# Patient Record
Sex: MALE | Race: WHITE | NOT HISPANIC OR LATINO | Employment: OTHER | URBAN - METROPOLITAN AREA
[De-identification: names, ages, dates, MRNs, and addresses within clinical notes are randomized per-mention and may not be internally consistent; named-entity substitution may affect disease eponyms.]

---

## 2017-04-24 ENCOUNTER — APPOINTMENT (OUTPATIENT)
Dept: GENERAL RADIOLOGY | Facility: HOSPITAL | Age: 67
End: 2017-04-24

## 2017-04-24 ENCOUNTER — TELEPHONE (OUTPATIENT)
Dept: FAMILY MEDICINE CLINIC | Facility: CLINIC | Age: 67
End: 2017-04-24

## 2017-04-24 PROCEDURE — 73610 X-RAY EXAM OF ANKLE: CPT | Performed by: FAMILY MEDICINE

## 2017-04-24 PROCEDURE — 73630 X-RAY EXAM OF FOOT: CPT | Performed by: FAMILY MEDICINE

## 2017-04-24 NOTE — TELEPHONE ENCOUNTER
----- Message from Michaela Zhang sent at 4/24/2017  9:14 AM EDT -----  Contact: [phone]  PT FELL BY MISSING STEP AND WANTS TO HAVE FOOT XRAYED. PLEASE CALL

## 2017-04-25 ENCOUNTER — TELEPHONE (OUTPATIENT)
Dept: URGENT CARE | Facility: CLINIC | Age: 67
End: 2017-04-25

## 2017-07-05 ENCOUNTER — HOSPITAL ENCOUNTER (OUTPATIENT)
Dept: SLEEP MEDICINE | Facility: HOSPITAL | Age: 67
Discharge: HOME | End: 2017-07-05
Attending: INTERNAL MEDICINE

## 2017-07-05 PROCEDURE — 99213 OFFICE O/P EST LOW 20 MIN: CPT | Performed by: INTERNAL MEDICINE

## 2017-07-09 NOTE — PROGRESS NOTES
Follow Up [facility] Disorders [facility] Note       Patient Care Team:  Bayron Moya MD as PCP - Family Medicine  Morris Cia MD as Consulting Physician (Sleep Medicine)    Chief Complaint:  COCO     Interval History:   The patient was last seen by me in July 2016.  From the COCO standpoint he is stable without any new complaints.  He goes to bed 11 PM awakens at 10 AM.  He wakes up twice due to neuropathy.  [place] Sleepiness Scale is normal at 6.    The patient is having increasing pain from spinal stenosis.  He did have a severe dental infection that is now corrected he underwent cataract surgeries he broke his ankle.    Review of Systems:  Recorded on the Sleep Questionnaire.  Unremarkable except for nasal congestion, abdominal bloating, ringing in his ears, and depression.    Social History:  He does not smoke cigarettes.  No alcohol.  3 caffeinated beverages a day.    Allergies:  Reviewed.     Medication Review:  His list was reviewed.      Vital Signs:  Height 68 1/4 inches and weight 182 and he is overweight with a body mass index of 27-28.    Physical Exam:    Constitutional:  Well developed white male and appears in no apparent distress.  Awake & oriented times 3.  Normal mood with normal recent and remote memory and normal judgement.  Eyes:  Conjunctivae normal.  Oropharynx:  moist mucous membranes without exudate and a large tongue and normal uvula and moderate narrowing of the posterior pharyngeal opening and class II MP airway.      Results Review:  DME is [facility] and he uses nasal pillows.  Downloads between January 6 and July 4, 2017 compliances 99% and average usage is 4 hours and 8 minutes and average AHI is abnormal at 10.1 previously it was 19.7.  He has no leak.  Average auto BiPAP pressures: IPAP 13.5 and EPAP 10.7.  Auto BiPAP settings: Max IPAP 16 minimum EPAP 8 max pressure support 6 minimal pressure support 2.       Impression:   Obstructive sleep apnea nearly adequately treated with auto  titrating BiPAP with good compliance and borderline usage.  He has no complaints of hypersomnolence.      Plan:  Good sleep hygiene measures should be maintained.  Some weight loss would be beneficial in this patient who is overweight by BMI.    The patient will continue his auto BiPAP every night as he is doing increased hours encouraged.  His minimum auto BiPAP EPAP will be changed from 8 up to 10.  A new prescription will be sent to his DME for all needed supplies.    The patient will follow up in one year.      Morris Cai MD  07/09/17  9:07 AM

## 2018-06-07 ENCOUNTER — TRANSCRIBE ORDERS (OUTPATIENT)
Dept: ADMINISTRATIVE | Facility: HOSPITAL | Age: 68
End: 2018-06-07

## 2018-06-07 DIAGNOSIS — N88.2 CERVICAL OS STENOSIS: Primary | ICD-10-CM

## 2018-06-22 RX ORDER — AMLODIPINE BESYLATE 10 MG/1
10 TABLET ORAL DAILY
COMMUNITY
End: 2019-07-12 | Stop reason: ALTCHOICE

## 2018-06-22 RX ORDER — BUPROPION HYDROCHLORIDE 150 MG/1
150 TABLET ORAL DAILY
COMMUNITY
End: 2019-07-12 | Stop reason: ALTCHOICE

## 2018-06-27 ENCOUNTER — HOSPITAL ENCOUNTER (OUTPATIENT)
Dept: CT IMAGING | Facility: HOSPITAL | Age: 68
Discharge: HOME | End: 2018-06-27
Attending: NEUROLOGICAL SURGERY | Admitting: NEUROLOGICAL SURGERY

## 2018-06-27 ENCOUNTER — HOSPITAL ENCOUNTER (OUTPATIENT)
Dept: GENERAL RADIOLOGY | Facility: HOSPITAL | Age: 68
Discharge: HOME | End: 2018-06-27
Attending: NEUROLOGICAL SURGERY

## 2018-06-27 VITALS
BODY MASS INDEX: 27.85 KG/M2 | DIASTOLIC BLOOD PRESSURE: 74 MMHG | WEIGHT: 188 LBS | TEMPERATURE: 98 F | RESPIRATION RATE: 16 BRPM | SYSTOLIC BLOOD PRESSURE: 118 MMHG | OXYGEN SATURATION: 97 % | HEIGHT: 69 IN | HEART RATE: 68 BPM

## 2018-06-27 DIAGNOSIS — N88.2 CERVICAL OS STENOSIS: ICD-10-CM

## 2018-06-27 PROCEDURE — 72125 CT NECK SPINE W/O DYE: CPT

## 2018-06-27 PROCEDURE — 25010000002 IOPAMIDOL 61 % SOLUTION: Performed by: RADIOLOGY

## 2018-06-27 PROCEDURE — 72240 MYELOGRAPHY NECK SPINE: CPT

## 2018-06-27 PROCEDURE — 62302 MYELOGRAPHY LUMBAR INJECTION: CPT

## 2018-06-27 RX ORDER — MULTIVITAMIN
1 TABLET ORAL
COMMUNITY
End: 2019-07-12

## 2018-06-27 RX ORDER — TESTOSTERONE 20.25 MG/1.25G
1 GEL TOPICAL DAILY
COMMUNITY
End: 2019-09-11 | Stop reason: ALTCHOICE

## 2018-06-27 RX ORDER — LANOLIN ALCOHOL/MO/W.PET/CERES
9 CREAM (GRAM) TOPICAL NIGHTLY
COMMUNITY
End: 2021-10-21

## 2018-06-27 RX ORDER — LIDOCAINE HYDROCHLORIDE 10 MG/ML
10 INJECTION, SOLUTION INFILTRATION; PERINEURAL ONCE
Status: COMPLETED | OUTPATIENT
Start: 2018-06-27 | End: 2018-06-27

## 2018-06-27 RX ADMIN — IOPAMIDOL 12 ML: 612 INJECTION, SOLUTION INTRATHECAL at 07:48

## 2018-06-27 RX ADMIN — LIDOCAINE HYDROCHLORIDE 5 ML: 10 INJECTION, SOLUTION INFILTRATION; PERINEURAL at 07:46

## 2018-06-27 NOTE — H&P
Name: Dilip Verma ADMIT: 2018   : 1950  PCP: No Known Provider    MRN: [id number] LOS: 0 days   AGE/SEX: 68 y.o. male  ROOM: Room/bed info not found       Chief complaint   Patient is a 68 y.o. male presents with neck pain.     Past Surgical History:  Past Surgical History:   Procedure Laterality Date   • CATARACT EXTRACTION EXTRACAPSULAR W/ INTRAOCULAR LENS IMPLANTATION Bilateral    • COLONOSCOPY  2015    dr [email or web address]   • DENTAL PROCEDURE      3 surgeries inder implants   • HERNIA REPAIR      inguinal bilaterally   • KIDNEY STONE SURGERY      lithotripsy       Past Medical History:  Past Medical History:   Diagnosis Date   • Anxiety    • Chronic kidney disease     stones- had lithotripsy   • Colon polyp    • Diabetes mellitus, type II    • Erectile dysfunction    • H/O bone density study never   • History of chest x-ray 07/15/2015    [facility] [place] [facility] [address]   • History of echocardiogram 2014    [facility]   • History of EKG 2014    DR. JOE MEDINA [phone] [facility] [place]   • History of stress test 2014    PET DIPYRIDAMOLE STRESS TEST; DR. JOE MEDINA - [facility] [place]   • Hx of echocardiogram 2014    [facility]   • Hyperlipidemia    • Hypertension    • Routine eye exam    • Sleep apnea     bipap       Home Medications:    (Not in a hospital admission)    Allergies:  Ace inhibitors and Sulfa antibiotics    Family History:  Family History   Problem Relation Age of Onset   • Depression Mother    • Cancer Mother         uterine   • Heart disease Father    • Hypertension Father    • Kidney disease Father    • Thyroid disease Father    • Depression Sister    • Alcohol abuse Brother    • Alcohol abuse Other    • Alcohol abuse Other    • Lung disease Other    • Thyroid disease Other    • Glaucoma Other        Social History:  Social History   Substance Use Topics   • Smoking status: Former Smoker     Packs/day: 0.75   • Smokeless tobacco: Never Used      Comment: Start age:39/Stopping age:47, 3/4 PPD   •  Alcohol use No        Objective     Physical Exam:   Cervical stenosis    Vital Signs       Anticipated Surgical Procedure:  myelogram    The risks, benefits and alternatives of this procedure have been discussed with the patient or responsible party: Yes        Alexandru Guerrero MD  06/27/18  6:36 AM

## 2018-06-27 NOTE — DISCHARGE INSTRUCTIONS
EDUCATION /DISCHARGE INSTRUCTIONS:  A myelogram is a special radiology procedure of the spinal cord, spinal nerves and other related structures.  You will be awake during the examination.  An area of your lower back will be cleansed with an antiseptic solution.  The physician will inject a numbing medication in your lower back.  While your back is numb, a needle will be placed in the lower back area.  A small amount of spinal fluid may be withdrawn and sent to the lab if ordered by your physician. While the needle is in the back, an injection of a contrast material (xray dye) will be given through the needle.  The contrast material will allow the physician to see the spinal cord and spinal nerves.  Once injected, the needle will be removed and a band aid will be placed over the injection site.  The table will be tilted during the process to allow the contrast material to flow to particular areas in the spine.  Following the injection and xrays, you will be taken to the CT scan where more pictures will be taken. After the procedure is finished, the contrast material will be absorbed by your body and eliminated through your kidneys.  The radiologist will study and interpret your myelogram and send the results to your physician.    Procedure risks of a myelogram include, but are not limited to:  *  Bleeding   *  seizure  *  Infection   *  Headache, possibly severe requiring  *  Contrast reaction      a blood patch  *  Nerve or cord injury  *  Paralysis and death    Benefits of the procedure:  *  Best examination for delineating pathology related to spinal cord compression from a disc and/or nerve root compression    Alternatives to the procedure:  MRI - a non invasive procedure requiring intravenous contrast injection.  Cannot be done on patients with certain pacemakers or metal in the body.  MRI risks include possible reaction to the contrast material, movement of metal located in the body.  Benefit to MRI:   Non-invasive and usually painless procedure.  THIS EDUCATION INFORMATION WAS REVIEWED PRIOR TO THE PROCEDURE AND CONSENT. Patient initials __________________Time____0717_____________    Important information following your myelogram:  *  Lie down with your head elevated no more than 2 pillows high today & tonight  *  Tomorrow, lie down with 1 pillow all day and all night  *  Sit up to eat meals and use the bathroom, otherwise, lie down  *  Do not drive for 48 hours following a myelogram  *  You may remove the bandage and shower in the morning  *  Increase your fluids for the next 24 hours.  Caffeinated drinks are encouraged.     Resume taking your blood thinner or Aspirin on _No Aspirin for 24 hours after the myelogram __    Resume taking antipsychotics/antidepressant on _Wellbutrin (buproprion)  Resume 24 hours after the myelogram 6/28/18 after 1100__    CALL YOUR PHYSICIAN FOR THE FOLLOWING:    * Pain at the injection site  * Reddness, swelling, bruising or drainage at the injection site  * A fever by mouth of 101.0  * Any new symptoms    If you have problems with a headache that is not relieved with rest and medication, please call the Radiology Triage Nurse desk  [phone]

## 2018-06-27 NOTE — NURSING NOTE
Verbal and written D/C instructions given to patient and wife.  They voice understanding and are able to teach back D/C instructions.

## 2018-06-28 ENCOUNTER — TELEPHONE (OUTPATIENT)
Dept: INTERVENTIONAL RADIOLOGY/VASCULAR | Facility: HOSPITAL | Age: 68
End: 2018-06-28

## 2018-07-19 ENCOUNTER — OFFICE VISIT (OUTPATIENT)
Dept: SLEEP MEDICINE | Facility: HOSPITAL | Age: 68
End: 2018-07-19
Attending: INTERNAL MEDICINE

## 2018-07-19 DIAGNOSIS — G47.33 OBSTRUCTIVE SLEEP APNEA SYNDROME: Primary | ICD-10-CM

## 2018-07-19 DIAGNOSIS — G47.14 HYPERSOMNIA DUE TO MEDICAL CONDITION: ICD-10-CM

## 2018-07-19 PROCEDURE — G0463 HOSPITAL OUTPT CLINIC VISIT: HCPCS

## 2018-07-19 PROCEDURE — 99213 OFFICE O/P EST LOW 20 MIN: CPT | Performed by: INTERNAL MEDICINE

## 2018-07-19 NOTE — PROGRESS NOTES
Follow Up Sleep Disorders Center Note     Chief Complaint:  COCO     Primary Care Physician: No Known Provider    Interval History:   The patient was last seen by me in July 2017.  He is probably the same.  He complains of low energy.  He goes to bed at midnight and awakens between 7:30 and 9 AM.  [place] Sleepiness Scale is abnormal at 12.    Review of Systems:  Recorded on the Sleep Questionnaire.  Unremarkable except for nasal congestion and postnasal drip and some abdominal bloating    Social History:  5 cups of coffee  Social History     Social History   • Marital status:      Social History Main Topics   • Smoking status: Former Smoker     Packs/day: 0.75   • Smokeless tobacco: Never Used      Comment: Start age:39/Stopping age:47, 3/4 PPD   • Alcohol use No   • Drug use: No   • Sexual activity: Not Currently     Other Topics Concern   • Not on file       Allergies:  Ace inhibitors; Sulfa antibiotics; and Tetracycline     Medication Review:  Reviewed.      Vital Signs:  Height 68-1/4 inches, weight 190 pounds and BMI overweight at 28-29.    Physical Exam:    Constitutional:  Well developed white male and appears in no apparent distress.  Awake & oriented times 3.  Normal mood with normal recent and remote memory and normal judgement.  Eyes:  Conjunctivae normal.  Oropharynx:  moist mucous membranes without exudate and a large tongue and normal uvula and moderate narrowing of the posterior pharyngeal opening and class II MP airway      Results Review:  DME is [facility] and he uses a nasal interface.  Downloads between January 20 and July 18, 2018 compliances 100%.  Average usage is 7 hours and 1 minute.  Average AHI is mildly abnormal at 12.5 with a leak of 23 minutes.  Average AutoBiPAP pressure is IPAP of 15.4 and EPAP of 12.9.  Average auto BiPAP settings: Max IPAP 18 minimum EPAP 10 max pressure support 6 minimum pressure support 2.       Impression:   Obstructive sleep apnea nearly adequately treated with  auto titrating BiPAP with good compliance and usage with some persistent complaints of hypersomnolence.      Plan:  Good sleep hygiene measures should be maintained.  Weight loss would be beneficial in this patient who is overweight by BMI.  The patient is benefiting from the treatment being provided.     The patient will continue auto BiPAP.  He does complain about pressure being borderline to high.  Therefore, no changes presently.  New orders will be sent to his DME.    The patient will call for any problems and will follow up in one year.      Morris Cai MD  Sleep Medicine  07/19/18  11:21 AM

## 2018-07-21 PROBLEM — G47.14 HYPERSOMNIA DUE TO MEDICAL CONDITION: Status: ACTIVE | Noted: 2018-07-21

## 2019-01-16 ENCOUNTER — TELEPHONE (OUTPATIENT)
Dept: SLEEP MEDICINE | Facility: HOSPITAL | Age: 69
End: 2019-01-16

## 2019-01-16 NOTE — TELEPHONE ENCOUNTER
Patient spouse called into sleep lab concerned about her husbands pressure settings. She stated they took his SD card to naps to do a download to see how he was doing and was informed he had an ahi of 18 events per hour. Patient spouse stated he was having a lot of noticed events while he slept abnormal breathing patterns and such. Tech scheduled F/U appt ti discuss and go over with Dr. DIAZ

## 2019-01-17 ENCOUNTER — OFFICE VISIT (OUTPATIENT)
Dept: SLEEP MEDICINE | Facility: HOSPITAL | Age: 69
End: 2019-01-17
Attending: INTERNAL MEDICINE

## 2019-01-17 VITALS — HEIGHT: 69 IN | WEIGHT: 191 LBS | BODY MASS INDEX: 28.29 KG/M2

## 2019-01-17 DIAGNOSIS — R06.3 PERIODIC BREATHING: ICD-10-CM

## 2019-01-17 DIAGNOSIS — G47.33 OBSTRUCTIVE SLEEP APNEA SYNDROME: ICD-10-CM

## 2019-01-17 DIAGNOSIS — G47.14 HYPERSOMNIA DUE TO MEDICAL CONDITION: Primary | ICD-10-CM

## 2019-01-17 PROCEDURE — 99213 OFFICE O/P EST LOW 20 MIN: CPT | Performed by: INTERNAL MEDICINE

## 2019-01-17 PROCEDURE — G0463 HOSPITAL OUTPT CLINIC VISIT: HCPCS

## 2019-01-17 NOTE — PROGRESS NOTES
"Follow Up Sleep Disorders Center Note     Chief Complaint:  COCO     Primary Care Physician: Jaquan Crockett Jr., APRN    Interval History:   I last saw the patient in July.  He is here with his wife.  He states he is worse.  His blood pressure continues to be erratic.  The patient's wife has noted apnea.  He goes to bed between 11 PM and midnight and awakens between 6 and 7 AM.  [place] Sleepiness Scale is borderline abnormal at 8    Review of Systems:  Recorded on the Sleep Questionnaire.  Unremarkable except for postnasal drip, MARTIN, abdominal bloating, and depression from posttraumatic stress disorder    Social History:    Social History     Socioeconomic History   • Marital status:      Spouse name: Not on file   • Number of children: Not on file   • Years of education: Not on file   • Highest education level: Not on file   Tobacco Use   • Smoking status: Former Smoker     Packs/day: 0.75   • Smokeless tobacco: Never Used   • Tobacco comment: Start age:39/Stopping age:47, 3/4 PPD   Substance and Sexual Activity   • Alcohol use: No   • Drug use: No   • Sexual activity: Not Currently       Allergies:  Ace inhibitors; Sulfa antibiotics; and Tetracycline     Medication Review:  Reviewed.      Vital Signs:    Vitals:    01/17/19 1230   Weight: 86.6 kg (191 lb)   Height: 175.3 cm (69\")     Body mass index is 28.21 kg/m².    Physical Exam:    Constitutional:  Well developed 68 y.o. male that appears in no apparent distress.  Awake & oriented times 3.  Normal mood with normal recent and remote memory and normal judgement.  Eyes:  Conjunctivae normal.  Oropharynx:  moist mucous membranes without exudate and a large tongue and normal uvula and moderate narrowing of the posterior pharyngeal opening and class II MP airway      Results Review:  DME is [facility] and he uses a nasal interface.  Downloads between October 17 and January 14, 2019 compliances 99%.  Average usage is 7 hours and 51 minutes.  Average AHI is abnormal " at 12.5 without significant leak.  The patient's average percent on night and periodic breathing is recently increasing.  Average AutoBiPAP pressure is IPAP of 15 and EPAP of 12.7.  Auto BiPAP settings: Max IPAP 18 minimum EPAP 10 max pressure support 6 minimum pressure support 2.       Impression:   Obstructive sleep apnea nearly adequately treated with auto BiPAP with good compliance and usage and some persistent complaints of hypersomnolence.  The patient recently has had increasing amount of periodic breathing?    Plan:  Good sleep hygiene measures should be maintained.  Some weight loss would be beneficial in this patient who is overweight by BMI.  The patient is benefiting from the treatment being provided.     No changes recommended presently.  At follow-up, if persisting, read titration may need to be considered?    The patient will call for any problems and will follow up in 6 months.      Morris Cai MD  Sleep Medicine  01/17/19  12:43 PM

## 2019-01-20 PROBLEM — R06.3 PERIODIC BREATHING: Status: ACTIVE | Noted: 2019-01-20

## 2019-07-12 ENCOUNTER — APPOINTMENT (OUTPATIENT)
Dept: CT IMAGING | Facility: HOSPITAL | Age: 69
End: 2019-07-12

## 2019-07-12 ENCOUNTER — HOSPITAL ENCOUNTER (INPATIENT)
Facility: HOSPITAL | Age: 69
LOS: 13 days | Discharge: NURSING, REHAB OR LONG-TERM CARE | End: 2019-07-25
Attending: EMERGENCY MEDICINE | Admitting: THORACIC SURGERY (CARDIOTHORACIC VASCULAR SURGERY)

## 2019-07-12 ENCOUNTER — APPOINTMENT (OUTPATIENT)
Dept: GENERAL RADIOLOGY | Facility: HOSPITAL | Age: 69
End: 2019-07-12

## 2019-07-12 DIAGNOSIS — I10 ESSENTIAL HYPERTENSION: Primary | ICD-10-CM

## 2019-07-12 DIAGNOSIS — R26.89 DECREASED MOBILITY: ICD-10-CM

## 2019-07-12 DIAGNOSIS — I21.4 NSTEMI (NON-ST ELEVATED MYOCARDIAL INFARCTION) (HCC): ICD-10-CM

## 2019-07-12 DIAGNOSIS — I25.10 CORONARY ARTERY DISEASE INVOLVING NATIVE CORONARY ARTERY OF NATIVE HEART WITHOUT ANGINA PECTORIS: ICD-10-CM

## 2019-07-12 DIAGNOSIS — R59.0 MEDIASTINAL ADENOPATHY: ICD-10-CM

## 2019-07-12 LAB
ALBUMIN SERPL-MCNC: 4 G/DL (ref 3.5–5.2)
ALBUMIN/GLOB SERPL: 1.3 G/DL
ALP SERPL-CCNC: 67 U/L (ref 39–117)
ALT SERPL W P-5'-P-CCNC: 52 U/L (ref 1–41)
ANION GAP SERPL CALCULATED.3IONS-SCNC: 9.9 MMOL/L (ref 5–15)
AST SERPL-CCNC: 26 U/L (ref 1–40)
BASOPHILS # BLD AUTO: 0.02 10*3/MM3 (ref 0–0.2)
BASOPHILS NFR BLD AUTO: 0.3 % (ref 0–1.5)
BILIRUB SERPL-MCNC: 1.1 MG/DL (ref 0.2–1.2)
BUN BLD-MCNC: 17 MG/DL (ref 8–23)
BUN/CREAT SERPL: 15.6 (ref 7–25)
CALCIUM SPEC-SCNC: 9.6 MG/DL (ref 8.6–10.5)
CHLORIDE SERPL-SCNC: 102 MMOL/L (ref 98–107)
CO2 SERPL-SCNC: 26.1 MMOL/L (ref 22–29)
CREAT BLD-MCNC: 1.09 MG/DL (ref 0.76–1.27)
DEPRECATED RDW RBC AUTO: 52.3 FL (ref 37–54)
EOSINOPHIL # BLD AUTO: 0.16 10*3/MM3 (ref 0–0.4)
EOSINOPHIL NFR BLD AUTO: 2.4 % (ref 0.3–6.2)
ERYTHROCYTE [DISTWIDTH] IN BLOOD BY AUTOMATED COUNT: 14.7 % (ref 12.3–15.4)
GFR SERPL CREATININE-BSD FRML MDRD: 67 ML/MIN/1.73
GLOBULIN UR ELPH-MCNC: 3.2 GM/DL
GLUCOSE BLD-MCNC: 120 MG/DL (ref 65–99)
GLUCOSE BLDC GLUCOMTR-MCNC: 137 MG/DL (ref 70–130)
HCT VFR BLD AUTO: 42.6 % (ref 37.5–51)
HGB BLD-MCNC: 13.4 G/DL (ref 13–17.7)
IMM GRANULOCYTES # BLD AUTO: 0.02 10*3/MM3 (ref 0–0.05)
IMM GRANULOCYTES NFR BLD AUTO: 0.3 % (ref 0–0.5)
LYMPHOCYTES # BLD AUTO: 1.11 10*3/MM3 (ref 0.7–3.1)
LYMPHOCYTES NFR BLD AUTO: 16.9 % (ref 19.6–45.3)
MCH RBC QN AUTO: 30.3 PG (ref 26.6–33)
MCHC RBC AUTO-ENTMCNC: 31.5 G/DL (ref 31.5–35.7)
MCV RBC AUTO: 96.4 FL (ref 79–97)
MONOCYTES # BLD AUTO: 0.54 10*3/MM3 (ref 0.1–0.9)
MONOCYTES NFR BLD AUTO: 8.2 % (ref 5–12)
NEUTROPHILS # BLD AUTO: 4.72 10*3/MM3 (ref 1.7–7)
NEUTROPHILS NFR BLD AUTO: 71.9 % (ref 42.7–76)
NRBC BLD AUTO-RTO: 0 /100 WBC (ref 0–0.2)
NT-PROBNP SERPL-MCNC: 2828 PG/ML (ref 5–900)
PLATELET # BLD AUTO: 178 10*3/MM3 (ref 140–450)
PMV BLD AUTO: 10.4 FL (ref 6–12)
POTASSIUM BLD-SCNC: 4.2 MMOL/L (ref 3.5–5.2)
PROT SERPL-MCNC: 7.2 G/DL (ref 6–8.5)
RBC # BLD AUTO: 4.42 10*6/MM3 (ref 4.14–5.8)
SODIUM BLD-SCNC: 138 MMOL/L (ref 136–145)
TROPONIN T SERPL-MCNC: 0.08 NG/ML (ref 0–0.03)
WBC NRBC COR # BLD: 6.57 10*3/MM3 (ref 3.4–10.8)

## 2019-07-12 PROCEDURE — 71046 X-RAY EXAM CHEST 2 VIEWS: CPT

## 2019-07-12 PROCEDURE — G0378 HOSPITAL OBSERVATION PER HR: HCPCS

## 2019-07-12 PROCEDURE — 83880 ASSAY OF NATRIURETIC PEPTIDE: CPT | Performed by: EMERGENCY MEDICINE

## 2019-07-12 PROCEDURE — 82962 GLUCOSE BLOOD TEST: CPT

## 2019-07-12 PROCEDURE — 99284 EMERGENCY DEPT VISIT MOD MDM: CPT

## 2019-07-12 PROCEDURE — 99219 PR INITIAL OBSERVATION CARE/DAY 50 MINUTES: CPT | Performed by: NURSE PRACTITIONER

## 2019-07-12 PROCEDURE — 80053 COMPREHEN METABOLIC PANEL: CPT | Performed by: EMERGENCY MEDICINE

## 2019-07-12 PROCEDURE — 25010000002 ENOXAPARIN PER 10 MG: Performed by: NURSE PRACTITIONER

## 2019-07-12 PROCEDURE — 93005 ELECTROCARDIOGRAM TRACING: CPT | Performed by: EMERGENCY MEDICINE

## 2019-07-12 PROCEDURE — 70450 CT HEAD/BRAIN W/O DYE: CPT

## 2019-07-12 PROCEDURE — 93010 ELECTROCARDIOGRAM REPORT: CPT | Performed by: INTERNAL MEDICINE

## 2019-07-12 PROCEDURE — 25010000002 FUROSEMIDE PER 20 MG: Performed by: INTERNAL MEDICINE

## 2019-07-12 PROCEDURE — 85025 COMPLETE CBC W/AUTO DIFF WBC: CPT | Performed by: EMERGENCY MEDICINE

## 2019-07-12 PROCEDURE — 84484 ASSAY OF TROPONIN QUANT: CPT | Performed by: EMERGENCY MEDICINE

## 2019-07-12 RX ORDER — DEXTROSE MONOHYDRATE 25 G/50ML
25 INJECTION, SOLUTION INTRAVENOUS
Status: DISCONTINUED | OUTPATIENT
Start: 2019-07-12 | End: 2019-07-18

## 2019-07-12 RX ORDER — MELATONIN 2.5 MG
10 TABLET,CHEWABLE ORAL NIGHTLY
Status: DISCONTINUED | OUTPATIENT
Start: 2019-07-12 | End: 2019-07-18

## 2019-07-12 RX ORDER — HYDRALAZINE HYDROCHLORIDE 25 MG/1
25 TABLET, FILM COATED ORAL EVERY 8 HOURS SCHEDULED
Status: DISCONTINUED | OUTPATIENT
Start: 2019-07-12 | End: 2019-07-15

## 2019-07-12 RX ORDER — FUROSEMIDE 10 MG/ML
40 INJECTION INTRAMUSCULAR; INTRAVENOUS ONCE
Status: COMPLETED | OUTPATIENT
Start: 2019-07-12 | End: 2019-07-12

## 2019-07-12 RX ORDER — NITROGLYCERIN 0.4 MG/1
0.4 TABLET SUBLINGUAL
Status: DISCONTINUED | OUTPATIENT
Start: 2019-07-12 | End: 2019-07-18

## 2019-07-12 RX ORDER — PAROXETINE HYDROCHLORIDE HEMIHYDRATE 25 MG/1
25 TABLET, FILM COATED, EXTENDED RELEASE ORAL NIGHTLY
COMMUNITY
End: 2019-08-19

## 2019-07-12 RX ORDER — SODIUM CHLORIDE 0.9 % (FLUSH) 0.9 %
3 SYRINGE (ML) INJECTION EVERY 12 HOURS SCHEDULED
Status: DISCONTINUED | OUTPATIENT
Start: 2019-07-12 | End: 2019-07-18

## 2019-07-12 RX ORDER — NICOTINE POLACRILEX 4 MG
15 LOZENGE BUCCAL
Status: DISCONTINUED | OUTPATIENT
Start: 2019-07-12 | End: 2019-07-18

## 2019-07-12 RX ORDER — SODIUM CHLORIDE 0.9 % (FLUSH) 0.9 %
10 SYRINGE (ML) INJECTION AS NEEDED
Status: DISCONTINUED | OUTPATIENT
Start: 2019-07-12 | End: 2019-07-18

## 2019-07-12 RX ORDER — SODIUM CHLORIDE 0.9 % (FLUSH) 0.9 %
3-10 SYRINGE (ML) INJECTION AS NEEDED
Status: DISCONTINUED | OUTPATIENT
Start: 2019-07-12 | End: 2019-07-18

## 2019-07-12 RX ORDER — ASPIRIN 325 MG
325 TABLET ORAL ONCE
Status: COMPLETED | OUTPATIENT
Start: 2019-07-12 | End: 2019-07-12

## 2019-07-12 RX ORDER — LORAZEPAM 0.5 MG/1
0.5 TABLET ORAL EVERY 8 HOURS PRN
Status: DISCONTINUED | OUTPATIENT
Start: 2019-07-12 | End: 2019-07-18

## 2019-07-12 RX ORDER — ACETAMINOPHEN 325 MG/1
650 TABLET ORAL EVERY 4 HOURS PRN
Status: DISCONTINUED | OUTPATIENT
Start: 2019-07-12 | End: 2019-07-18

## 2019-07-12 RX ORDER — BUPROPION HYDROCHLORIDE 150 MG/1
150 TABLET ORAL DAILY
Status: DISCONTINUED | OUTPATIENT
Start: 2019-07-12 | End: 2019-07-13

## 2019-07-12 RX ORDER — PAROXETINE HYDROCHLORIDE HEMIHYDRATE 25 MG/1
25 TABLET, FILM COATED, EXTENDED RELEASE ORAL DAILY
Status: DISCONTINUED | OUTPATIENT
Start: 2019-07-12 | End: 2019-07-15

## 2019-07-12 RX ORDER — UREA 10 %
3 LOTION (ML) TOPICAL NIGHTLY PRN
Status: DISCONTINUED | OUTPATIENT
Start: 2019-07-12 | End: 2019-07-12

## 2019-07-12 RX ORDER — TAMSULOSIN HYDROCHLORIDE 0.4 MG/1
0.4 CAPSULE ORAL DAILY
Status: DISCONTINUED | OUTPATIENT
Start: 2019-07-12 | End: 2019-07-15

## 2019-07-12 RX ORDER — HYDRALAZINE HYDROCHLORIDE 100 MG/1
100 TABLET, FILM COATED ORAL 3 TIMES DAILY
Status: ON HOLD | COMMUNITY
End: 2019-07-25 | Stop reason: SDUPTHER

## 2019-07-12 RX ADMIN — ASPIRIN 325 MG: 325 TABLET ORAL at 17:19

## 2019-07-12 RX ADMIN — SODIUM CHLORIDE, PRESERVATIVE FREE 3 ML: 5 INJECTION INTRAVENOUS at 22:00

## 2019-07-12 RX ADMIN — ENOXAPARIN SODIUM 90 MG: 100 INJECTION SUBCUTANEOUS at 22:00

## 2019-07-12 RX ADMIN — FUROSEMIDE 40 MG: 10 INJECTION, SOLUTION INTRAMUSCULAR; INTRAVENOUS at 18:26

## 2019-07-12 RX ADMIN — PAROXETINE HYDROCHLORIDE 25 MG: 25 TABLET, FILM COATED, EXTENDED RELEASE ORAL at 21:59

## 2019-07-12 RX ADMIN — HYDRALAZINE HYDROCHLORIDE 25 MG: 25 TABLET, FILM COATED ORAL at 22:00

## 2019-07-12 RX ADMIN — NITROGLYCERIN 1 INCH: 20 OINTMENT TOPICAL at 18:26

## 2019-07-12 RX ADMIN — TAMSULOSIN HYDROCHLORIDE 0.4 MG: 0.4 CAPSULE ORAL at 22:00

## 2019-07-13 LAB
ANION GAP SERPL CALCULATED.3IONS-SCNC: 9.2 MMOL/L (ref 5–15)
BUN BLD-MCNC: 19 MG/DL (ref 8–23)
BUN/CREAT SERPL: 16 (ref 7–25)
CALCIUM SPEC-SCNC: 8.6 MG/DL (ref 8.6–10.5)
CHLORIDE SERPL-SCNC: 103 MMOL/L (ref 98–107)
CHOLEST SERPL-MCNC: 92 MG/DL (ref 0–200)
CO2 SERPL-SCNC: 27.8 MMOL/L (ref 22–29)
CREAT BLD-MCNC: 1.19 MG/DL (ref 0.76–1.27)
GFR SERPL CREATININE-BSD FRML MDRD: 60 ML/MIN/1.73
GLUCOSE BLD-MCNC: 133 MG/DL (ref 65–99)
GLUCOSE BLDC GLUCOMTR-MCNC: 104 MG/DL (ref 70–130)
GLUCOSE BLDC GLUCOMTR-MCNC: 105 MG/DL (ref 70–130)
GLUCOSE BLDC GLUCOMTR-MCNC: 139 MG/DL (ref 70–130)
GLUCOSE BLDC GLUCOMTR-MCNC: 171 MG/DL (ref 70–130)
HDLC SERPL-MCNC: 26 MG/DL (ref 40–60)
LDLC SERPL CALC-MCNC: 51 MG/DL (ref 0–100)
LDLC/HDLC SERPL: 1.95 {RATIO}
POTASSIUM BLD-SCNC: 3.7 MMOL/L (ref 3.5–5.2)
SODIUM BLD-SCNC: 140 MMOL/L (ref 136–145)
TRIGL SERPL-MCNC: 76 MG/DL (ref 0–150)
TROPONIN T SERPL-MCNC: 0.12 NG/ML (ref 0–0.03)
TSH SERPL DL<=0.05 MIU/L-ACNC: 1.06 MIU/ML (ref 0.27–4.2)
VLDLC SERPL-MCNC: 15.2 MG/DL (ref 5–40)

## 2019-07-13 PROCEDURE — 93005 ELECTROCARDIOGRAM TRACING: CPT | Performed by: NURSE PRACTITIONER

## 2019-07-13 PROCEDURE — 80061 LIPID PANEL: CPT | Performed by: INTERNAL MEDICINE

## 2019-07-13 PROCEDURE — 84484 ASSAY OF TROPONIN QUANT: CPT | Performed by: INTERNAL MEDICINE

## 2019-07-13 PROCEDURE — 82962 GLUCOSE BLOOD TEST: CPT

## 2019-07-13 PROCEDURE — 99226 PR SBSQ OBSERVATION CARE/DAY 35 MINUTES: CPT | Performed by: INTERNAL MEDICINE

## 2019-07-13 PROCEDURE — 93010 ELECTROCARDIOGRAM REPORT: CPT | Performed by: INTERNAL MEDICINE

## 2019-07-13 PROCEDURE — 84443 ASSAY THYROID STIM HORMONE: CPT | Performed by: INTERNAL MEDICINE

## 2019-07-13 PROCEDURE — 80048 BASIC METABOLIC PNL TOTAL CA: CPT | Performed by: INTERNAL MEDICINE

## 2019-07-13 PROCEDURE — G0378 HOSPITAL OBSERVATION PER HR: HCPCS

## 2019-07-13 PROCEDURE — 63710000001 INSULIN LISPRO (HUMAN) PER 5 UNITS: Performed by: INTERNAL MEDICINE

## 2019-07-13 RX ORDER — METOPROLOL SUCCINATE 25 MG/1
25 TABLET, EXTENDED RELEASE ORAL
Status: DISCONTINUED | OUTPATIENT
Start: 2019-07-13 | End: 2019-07-18

## 2019-07-13 RX ORDER — ISOSORBIDE MONONITRATE 60 MG/1
60 TABLET, EXTENDED RELEASE ORAL
Status: DISCONTINUED | OUTPATIENT
Start: 2019-07-13 | End: 2019-07-15

## 2019-07-13 RX ADMIN — TAMSULOSIN HYDROCHLORIDE 0.4 MG: 0.4 CAPSULE ORAL at 11:24

## 2019-07-13 RX ADMIN — ISOSORBIDE MONONITRATE 60 MG: 60 TABLET ORAL at 11:23

## 2019-07-13 RX ADMIN — NITROGLYCERIN 1 INCH: 20 OINTMENT TOPICAL at 06:21

## 2019-07-13 RX ADMIN — LORAZEPAM 0.5 MG: 0.5 TABLET ORAL at 23:42

## 2019-07-13 RX ADMIN — INSULIN LISPRO 2 UNITS: 100 INJECTION, SOLUTION INTRAVENOUS; SUBCUTANEOUS at 21:15

## 2019-07-13 RX ADMIN — PAROXETINE HYDROCHLORIDE 25 MG: 25 TABLET, FILM COATED, EXTENDED RELEASE ORAL at 21:15

## 2019-07-13 RX ADMIN — METOPROLOL SUCCINATE 25 MG: 25 TABLET, FILM COATED, EXTENDED RELEASE ORAL at 11:24

## 2019-07-13 RX ADMIN — HYDRALAZINE HYDROCHLORIDE 25 MG: 25 TABLET, FILM COATED ORAL at 06:21

## 2019-07-13 RX ADMIN — Medication 10 MG: at 23:00

## 2019-07-13 RX ADMIN — HYDRALAZINE HYDROCHLORIDE 25 MG: 25 TABLET, FILM COATED ORAL at 15:13

## 2019-07-13 RX ADMIN — SODIUM CHLORIDE, PRESERVATIVE FREE 3 ML: 5 INJECTION INTRAVENOUS at 09:08

## 2019-07-13 RX ADMIN — Medication 10 MG: at 00:50

## 2019-07-13 RX ADMIN — SODIUM CHLORIDE, PRESERVATIVE FREE 3 ML: 5 INJECTION INTRAVENOUS at 21:15

## 2019-07-13 RX ADMIN — HYDRALAZINE HYDROCHLORIDE 25 MG: 25 TABLET, FILM COATED ORAL at 21:14

## 2019-07-14 ENCOUNTER — APPOINTMENT (OUTPATIENT)
Dept: ULTRASOUND IMAGING | Facility: HOSPITAL | Age: 69
End: 2019-07-14

## 2019-07-14 LAB
ANION GAP SERPL CALCULATED.3IONS-SCNC: 13.5 MMOL/L (ref 5–15)
BUN BLD-MCNC: 20 MG/DL (ref 8–23)
BUN/CREAT SERPL: 16.8 (ref 7–25)
CALCIUM SPEC-SCNC: 8.6 MG/DL (ref 8.6–10.5)
CHLORIDE SERPL-SCNC: 104 MMOL/L (ref 98–107)
CO2 SERPL-SCNC: 26.5 MMOL/L (ref 22–29)
CREAT BLD-MCNC: 1.19 MG/DL (ref 0.76–1.27)
GFR SERPL CREATININE-BSD FRML MDRD: 60 ML/MIN/1.73
GLUCOSE BLD-MCNC: 122 MG/DL (ref 65–99)
GLUCOSE BLDC GLUCOMTR-MCNC: 131 MG/DL (ref 70–130)
GLUCOSE BLDC GLUCOMTR-MCNC: 135 MG/DL (ref 70–130)
GLUCOSE BLDC GLUCOMTR-MCNC: 139 MG/DL (ref 70–130)
GLUCOSE BLDC GLUCOMTR-MCNC: 67 MG/DL (ref 70–130)
GLUCOSE BLDC GLUCOMTR-MCNC: 79 MG/DL (ref 70–130)
GLUCOSE BLDC GLUCOMTR-MCNC: 90 MG/DL (ref 70–130)
GLUCOSE BLDC GLUCOMTR-MCNC: 98 MG/DL (ref 70–130)
POTASSIUM BLD-SCNC: 4.1 MMOL/L (ref 3.5–5.2)
SODIUM BLD-SCNC: 144 MMOL/L (ref 136–145)
TROPONIN T SERPL-MCNC: 0.1 NG/ML (ref 0–0.03)

## 2019-07-14 PROCEDURE — 99225 PR SBSQ OBSERVATION CARE/DAY 25 MINUTES: CPT | Performed by: INTERNAL MEDICINE

## 2019-07-14 PROCEDURE — 76775 US EXAM ABDO BACK WALL LIM: CPT

## 2019-07-14 PROCEDURE — 82962 GLUCOSE BLOOD TEST: CPT

## 2019-07-14 PROCEDURE — G0378 HOSPITAL OBSERVATION PER HR: HCPCS

## 2019-07-14 PROCEDURE — 84484 ASSAY OF TROPONIN QUANT: CPT | Performed by: INTERNAL MEDICINE

## 2019-07-14 PROCEDURE — 94799 UNLISTED PULMONARY SVC/PX: CPT

## 2019-07-14 PROCEDURE — 80048 BASIC METABOLIC PNL TOTAL CA: CPT | Performed by: INTERNAL MEDICINE

## 2019-07-14 RX ADMIN — DEXTROSE MONOHYDRATE 25 G: 70 INJECTION, SOLUTION INTRAVENOUS at 15:24

## 2019-07-14 RX ADMIN — HYDRALAZINE HYDROCHLORIDE 25 MG: 25 TABLET, FILM COATED ORAL at 06:55

## 2019-07-14 RX ADMIN — HYDRALAZINE HYDROCHLORIDE 25 MG: 25 TABLET, FILM COATED ORAL at 13:47

## 2019-07-14 RX ADMIN — PAROXETINE HYDROCHLORIDE 25 MG: 25 TABLET, FILM COATED, EXTENDED RELEASE ORAL at 22:02

## 2019-07-14 RX ADMIN — METOPROLOL SUCCINATE 25 MG: 25 TABLET, FILM COATED, EXTENDED RELEASE ORAL at 13:56

## 2019-07-14 RX ADMIN — HYDRALAZINE HYDROCHLORIDE 25 MG: 25 TABLET, FILM COATED ORAL at 22:02

## 2019-07-14 RX ADMIN — SODIUM CHLORIDE, PRESERVATIVE FREE 3 ML: 5 INJECTION INTRAVENOUS at 08:25

## 2019-07-14 RX ADMIN — SODIUM CHLORIDE, PRESERVATIVE FREE 3 ML: 5 INJECTION INTRAVENOUS at 22:02

## 2019-07-14 RX ADMIN — ISOSORBIDE MONONITRATE 60 MG: 60 TABLET ORAL at 13:56

## 2019-07-14 RX ADMIN — TAMSULOSIN HYDROCHLORIDE 0.4 MG: 0.4 CAPSULE ORAL at 13:47

## 2019-07-14 RX ADMIN — Medication 10 MG: at 22:03

## 2019-07-15 ENCOUNTER — APPOINTMENT (OUTPATIENT)
Dept: CT IMAGING | Facility: HOSPITAL | Age: 69
End: 2019-07-15

## 2019-07-15 ENCOUNTER — APPOINTMENT (OUTPATIENT)
Dept: RESPIRATORY THERAPY | Facility: HOSPITAL | Age: 69
End: 2019-07-15

## 2019-07-15 LAB
GLUCOSE BLDC GLUCOMTR-MCNC: 148 MG/DL (ref 70–130)
GLUCOSE BLDC GLUCOMTR-MCNC: 157 MG/DL (ref 70–130)
GLUCOSE BLDC GLUCOMTR-MCNC: 88 MG/DL (ref 70–130)
GLUCOSE BLDC GLUCOMTR-MCNC: 99 MG/DL (ref 70–130)

## 2019-07-15 PROCEDURE — 94060 EVALUATION OF WHEEZING: CPT

## 2019-07-15 PROCEDURE — G0378 HOSPITAL OBSERVATION PER HR: HCPCS

## 2019-07-15 PROCEDURE — 93458 L HRT ARTERY/VENTRICLE ANGIO: CPT | Performed by: INTERNAL MEDICINE

## 2019-07-15 PROCEDURE — 70498 CT ANGIOGRAPHY NECK: CPT

## 2019-07-15 PROCEDURE — C1887 CATHETER, GUIDING: HCPCS | Performed by: INTERNAL MEDICINE

## 2019-07-15 PROCEDURE — 85347 COAGULATION TIME ACTIVATED: CPT

## 2019-07-15 PROCEDURE — 99214 OFFICE O/P EST MOD 30 MIN: CPT | Performed by: PSYCHIATRY & NEUROLOGY

## 2019-07-15 PROCEDURE — 4A033BC MEASUREMENT OF ARTERIAL PRESSURE, CORONARY, PERCUTANEOUS APPROACH: ICD-10-PCS | Performed by: INTERNAL MEDICINE

## 2019-07-15 PROCEDURE — 99223 1ST HOSP IP/OBS HIGH 75: CPT | Performed by: THORACIC SURGERY (CARDIOTHORACIC VASCULAR SURGERY)

## 2019-07-15 PROCEDURE — 25010000002 FENTANYL CITRATE (PF) 100 MCG/2ML SOLUTION: Performed by: INTERNAL MEDICINE

## 2019-07-15 PROCEDURE — 71250 CT THORAX DX C-: CPT

## 2019-07-15 PROCEDURE — C1769 GUIDE WIRE: HCPCS | Performed by: INTERNAL MEDICINE

## 2019-07-15 PROCEDURE — 93571 IV DOP VEL&/PRESS C FLO 1ST: CPT | Performed by: INTERNAL MEDICINE

## 2019-07-15 PROCEDURE — C1894 INTRO/SHEATH, NON-LASER: HCPCS | Performed by: INTERNAL MEDICINE

## 2019-07-15 PROCEDURE — B2151ZZ FLUOROSCOPY OF LEFT HEART USING LOW OSMOLAR CONTRAST: ICD-10-PCS | Performed by: INTERNAL MEDICINE

## 2019-07-15 PROCEDURE — 25010000002 MIDAZOLAM PER 1 MG: Performed by: INTERNAL MEDICINE

## 2019-07-15 PROCEDURE — 94640 AIRWAY INHALATION TREATMENT: CPT

## 2019-07-15 PROCEDURE — 99152 MOD SED SAME PHYS/QHP 5/>YRS: CPT | Performed by: INTERNAL MEDICINE

## 2019-07-15 PROCEDURE — 0 IOPAMIDOL PER 1 ML: Performed by: INTERNAL MEDICINE

## 2019-07-15 PROCEDURE — 25010000002 IOPAMIDOL 61 % SOLUTION: Performed by: INTERNAL MEDICINE

## 2019-07-15 PROCEDURE — 25010000002 BH (CUPID ONLY) ADENOSINE 6 MG/100ML MIXTURE: Performed by: INTERNAL MEDICINE

## 2019-07-15 PROCEDURE — 4A023N7 MEASUREMENT OF CARDIAC SAMPLING AND PRESSURE, LEFT HEART, PERCUTANEOUS APPROACH: ICD-10-PCS | Performed by: INTERNAL MEDICINE

## 2019-07-15 PROCEDURE — 82962 GLUCOSE BLOOD TEST: CPT

## 2019-07-15 PROCEDURE — 99153 MOD SED SAME PHYS/QHP EA: CPT | Performed by: INTERNAL MEDICINE

## 2019-07-15 PROCEDURE — 70496 CT ANGIOGRAPHY HEAD: CPT

## 2019-07-15 PROCEDURE — 25010000002 HEPARIN (PORCINE) PER 1000 UNITS: Performed by: INTERNAL MEDICINE

## 2019-07-15 PROCEDURE — B2111ZZ FLUOROSCOPY OF MULTIPLE CORONARY ARTERIES USING LOW OSMOLAR CONTRAST: ICD-10-PCS | Performed by: INTERNAL MEDICINE

## 2019-07-15 RX ORDER — HYDROCODONE BITARTRATE AND ACETAMINOPHEN 5; 325 MG/1; MG/1
1 TABLET ORAL EVERY 4 HOURS PRN
Status: DISCONTINUED | OUTPATIENT
Start: 2019-07-15 | End: 2019-07-18

## 2019-07-15 RX ORDER — ONDANSETRON 2 MG/ML
4 INJECTION INTRAMUSCULAR; INTRAVENOUS EVERY 6 HOURS PRN
Status: DISCONTINUED | OUTPATIENT
Start: 2019-07-15 | End: 2019-07-18

## 2019-07-15 RX ORDER — HYDRALAZINE HYDROCHLORIDE 25 MG/1
25 TABLET, FILM COATED ORAL EVERY 8 HOURS SCHEDULED
Status: DISCONTINUED | OUTPATIENT
Start: 2019-07-15 | End: 2019-07-18

## 2019-07-15 RX ORDER — SODIUM CHLORIDE 9 MG/ML
75 INJECTION, SOLUTION INTRAVENOUS CONTINUOUS
Status: ACTIVE | OUTPATIENT
Start: 2019-07-15 | End: 2019-07-15

## 2019-07-15 RX ORDER — FENTANYL CITRATE 50 UG/ML
INJECTION, SOLUTION INTRAMUSCULAR; INTRAVENOUS AS NEEDED
Status: DISCONTINUED | OUTPATIENT
Start: 2019-07-15 | End: 2019-07-15 | Stop reason: HOSPADM

## 2019-07-15 RX ORDER — ACETAMINOPHEN 325 MG/1
650 TABLET ORAL EVERY 4 HOURS PRN
Status: DISCONTINUED | OUTPATIENT
Start: 2019-07-15 | End: 2019-07-18

## 2019-07-15 RX ORDER — ALBUTEROL SULFATE 2.5 MG/3ML
2.5 SOLUTION RESPIRATORY (INHALATION) ONCE AS NEEDED
Status: COMPLETED | OUTPATIENT
Start: 2019-07-15 | End: 2019-07-15

## 2019-07-15 RX ORDER — ONDANSETRON 4 MG/1
4 TABLET, FILM COATED ORAL EVERY 6 HOURS PRN
Status: DISCONTINUED | OUTPATIENT
Start: 2019-07-15 | End: 2019-07-18

## 2019-07-15 RX ORDER — TAMSULOSIN HYDROCHLORIDE 0.4 MG/1
0.4 CAPSULE ORAL DAILY
Status: DISCONTINUED | OUTPATIENT
Start: 2019-07-15 | End: 2019-07-18

## 2019-07-15 RX ORDER — MIDAZOLAM HYDROCHLORIDE 1 MG/ML
INJECTION INTRAMUSCULAR; INTRAVENOUS AS NEEDED
Status: DISCONTINUED | OUTPATIENT
Start: 2019-07-15 | End: 2019-07-15 | Stop reason: HOSPADM

## 2019-07-15 RX ORDER — HEPARIN SODIUM 1000 [USP'U]/ML
INJECTION, SOLUTION INTRAVENOUS; SUBCUTANEOUS AS NEEDED
Status: DISCONTINUED | OUTPATIENT
Start: 2019-07-15 | End: 2019-07-15 | Stop reason: HOSPADM

## 2019-07-15 RX ORDER — SODIUM CHLORIDE 9 MG/ML
INJECTION, SOLUTION INTRAVENOUS CONTINUOUS PRN
Status: COMPLETED | OUTPATIENT
Start: 2019-07-15 | End: 2019-07-15

## 2019-07-15 RX ORDER — NALOXONE HCL 0.4 MG/ML
0.4 VIAL (ML) INJECTION
Status: DISCONTINUED | OUTPATIENT
Start: 2019-07-15 | End: 2019-07-18

## 2019-07-15 RX ORDER — MORPHINE SULFATE 2 MG/ML
1 INJECTION, SOLUTION INTRAMUSCULAR; INTRAVENOUS EVERY 4 HOURS PRN
Status: DISCONTINUED | OUTPATIENT
Start: 2019-07-15 | End: 2019-07-18

## 2019-07-15 RX ORDER — HYDRALAZINE HYDROCHLORIDE 20 MG/ML
10 INJECTION INTRAMUSCULAR; INTRAVENOUS ONCE
Status: DISCONTINUED | OUTPATIENT
Start: 2019-07-15 | End: 2019-07-18

## 2019-07-15 RX ORDER — PAROXETINE HYDROCHLORIDE HEMIHYDRATE 25 MG/1
25 TABLET, FILM COATED, EXTENDED RELEASE ORAL DAILY
Status: DISCONTINUED | OUTPATIENT
Start: 2019-07-15 | End: 2019-07-18

## 2019-07-15 RX ORDER — LIDOCAINE HYDROCHLORIDE 20 MG/ML
INJECTION, SOLUTION INFILTRATION; PERINEURAL AS NEEDED
Status: DISCONTINUED | OUTPATIENT
Start: 2019-07-15 | End: 2019-07-15 | Stop reason: HOSPADM

## 2019-07-15 RX ORDER — ISOSORBIDE MONONITRATE 60 MG/1
60 TABLET, EXTENDED RELEASE ORAL
Status: DISCONTINUED | OUTPATIENT
Start: 2019-07-15 | End: 2019-07-18

## 2019-07-15 RX ADMIN — PAROXETINE HYDROCHLORIDE 25 MG: 25 TABLET, FILM COATED, EXTENDED RELEASE ORAL at 20:10

## 2019-07-15 RX ADMIN — ISOSORBIDE MONONITRATE 60 MG: 60 TABLET ORAL at 15:03

## 2019-07-15 RX ADMIN — HYDRALAZINE HYDROCHLORIDE 25 MG: 25 TABLET, FILM COATED ORAL at 20:09

## 2019-07-15 RX ADMIN — IOPAMIDOL 95 ML: 612 INJECTION, SOLUTION INTRAVENOUS at 21:04

## 2019-07-15 RX ADMIN — TAMSULOSIN HYDROCHLORIDE 0.4 MG: 0.4 CAPSULE ORAL at 15:04

## 2019-07-15 RX ADMIN — SODIUM CHLORIDE 75 ML/HR: 9 INJECTION, SOLUTION INTRAVENOUS at 09:49

## 2019-07-15 RX ADMIN — METOPROLOL SUCCINATE 25 MG: 25 TABLET, FILM COATED, EXTENDED RELEASE ORAL at 07:40

## 2019-07-15 RX ADMIN — ALBUTEROL SULFATE 2.5 MG: 2.5 SOLUTION RESPIRATORY (INHALATION) at 16:28

## 2019-07-15 RX ADMIN — HYDRALAZINE HYDROCHLORIDE 25 MG: 25 TABLET, FILM COATED ORAL at 06:50

## 2019-07-15 RX ADMIN — HYDRALAZINE HYDROCHLORIDE 25 MG: 25 TABLET, FILM COATED ORAL at 14:51

## 2019-07-16 ENCOUNTER — APPOINTMENT (OUTPATIENT)
Dept: MRI IMAGING | Facility: HOSPITAL | Age: 69
End: 2019-07-16

## 2019-07-16 ENCOUNTER — APPOINTMENT (OUTPATIENT)
Dept: CARDIOLOGY | Facility: HOSPITAL | Age: 69
End: 2019-07-16

## 2019-07-16 PROBLEM — I25.10 CORONARY ARTERY DISEASE INVOLVING NATIVE CORONARY ARTERY OF NATIVE HEART WITHOUT ANGINA PECTORIS: Status: ACTIVE | Noted: 2019-07-12

## 2019-07-16 LAB
ACT BLD: 279 SECONDS (ref 82–152)
ALBUMIN SERPL-MCNC: 3.6 G/DL (ref 3.5–5.2)
ALBUMIN/GLOB SERPL: 1.4 G/DL
ALP SERPL-CCNC: 59 U/L (ref 39–117)
ALT SERPL W P-5'-P-CCNC: 65 U/L (ref 1–41)
ANION GAP SERPL CALCULATED.3IONS-SCNC: 8.1 MMOL/L (ref 5–15)
AST SERPL-CCNC: 33 U/L (ref 1–40)
BH CV ECHO MEAS - ACS: 1.8 CM
BH CV ECHO MEAS - AO MAX PG (FULL): 4.8 MMHG
BH CV ECHO MEAS - AO MAX PG: 8.9 MMHG
BH CV ECHO MEAS - AO MEAN PG (FULL): 2 MMHG
BH CV ECHO MEAS - AO MEAN PG: 4 MMHG
BH CV ECHO MEAS - AO ROOT AREA (BSA CORRECTED): 1.5
BH CV ECHO MEAS - AO ROOT AREA: 7.5 CM^2
BH CV ECHO MEAS - AO ROOT DIAM: 3.1 CM
BH CV ECHO MEAS - AO V2 MAX: 149 CM/SEC
BH CV ECHO MEAS - AO V2 MEAN: 94.6 CM/SEC
BH CV ECHO MEAS - AO V2 VTI: 30.7 CM
BH CV ECHO MEAS - ASC AORTA: 2.9 CM
BH CV ECHO MEAS - AVA(I,A): 1.7 CM^2
BH CV ECHO MEAS - AVA(I,D): 1.7 CM^2
BH CV ECHO MEAS - AVA(V,A): 1.7 CM^2
BH CV ECHO MEAS - AVA(V,D): 1.7 CM^2
BH CV ECHO MEAS - BSA(HAYCOCK): 2.1 M^2
BH CV ECHO MEAS - BSA: 2 M^2
BH CV ECHO MEAS - BZI_BMI: 29.2 KILOGRAMS/M^2
BH CV ECHO MEAS - BZI_METRIC_HEIGHT: 172.7 CM
BH CV ECHO MEAS - BZI_METRIC_WEIGHT: 87.1 KG
BH CV ECHO MEAS - EDV(CUBED): 54.9 ML
BH CV ECHO MEAS - EDV(MOD-SP2): 82 ML
BH CV ECHO MEAS - EDV(MOD-SP4): 78 ML
BH CV ECHO MEAS - EDV(TEICH): 62 ML
BH CV ECHO MEAS - EF(CUBED): 64.1 %
BH CV ECHO MEAS - EF(MOD-BP): 55 %
BH CV ECHO MEAS - EF(MOD-SP2): 54.9 %
BH CV ECHO MEAS - EF(MOD-SP4): 53.8 %
BH CV ECHO MEAS - EF(TEICH): 56.4 %
BH CV ECHO MEAS - ESV(CUBED): 19.7 ML
BH CV ECHO MEAS - ESV(MOD-SP2): 37 ML
BH CV ECHO MEAS - ESV(MOD-SP4): 36 ML
BH CV ECHO MEAS - ESV(TEICH): 27 ML
BH CV ECHO MEAS - FS: 28.9 %
BH CV ECHO MEAS - IVS/LVPW: 1.1
BH CV ECHO MEAS - IVSD: 1.5 CM
BH CV ECHO MEAS - LAT PEAK E' VEL: 5 CM/SEC
BH CV ECHO MEAS - LV DIASTOLIC VOL/BSA (35-75): 38.8 ML/M^2
BH CV ECHO MEAS - LV MASS(C)D: 205.2 GRAMS
BH CV ECHO MEAS - LV MASS(C)DI: 102.1 GRAMS/M^2
BH CV ECHO MEAS - LV MAX PG: 4.1 MMHG
BH CV ECHO MEAS - LV MEAN PG: 2 MMHG
BH CV ECHO MEAS - LV SYSTOLIC VOL/BSA (12-30): 17.9 ML/M^2
BH CV ECHO MEAS - LV V1 MAX: 101 CM/SEC
BH CV ECHO MEAS - LV V1 MEAN: 62.4 CM/SEC
BH CV ECHO MEAS - LV V1 VTI: 20.5 CM
BH CV ECHO MEAS - LVIDD: 3.8 CM
BH CV ECHO MEAS - LVIDS: 2.7 CM
BH CV ECHO MEAS - LVLD AP2: 7.8 CM
BH CV ECHO MEAS - LVLD AP4: 8.3 CM
BH CV ECHO MEAS - LVLS AP2: 6.8 CM
BH CV ECHO MEAS - LVLS AP4: 7.2 CM
BH CV ECHO MEAS - LVOT AREA (M): 2.5 CM^2
BH CV ECHO MEAS - LVOT AREA: 2.5 CM^2
BH CV ECHO MEAS - LVOT DIAM: 1.8 CM
BH CV ECHO MEAS - LVPWD: 1.4 CM
BH CV ECHO MEAS - MED PEAK E' VEL: 6 CM/SEC
BH CV ECHO MEAS - MR MAX PG: 71.6 MMHG
BH CV ECHO MEAS - MR MAX VEL: 423 CM/SEC
BH CV ECHO MEAS - MV A DUR: 0.13 SEC
BH CV ECHO MEAS - MV A MAX VEL: 50.2 CM/SEC
BH CV ECHO MEAS - MV DEC SLOPE: 340 CM/SEC^2
BH CV ECHO MEAS - MV DEC TIME: 204 SEC
BH CV ECHO MEAS - MV E MAX VEL: 114 CM/SEC
BH CV ECHO MEAS - MV E/A: 2.3
BH CV ECHO MEAS - MV MAX PG: 5.4 MMHG
BH CV ECHO MEAS - MV MEAN PG: 2 MMHG
BH CV ECHO MEAS - MV P1/2T MAX VEL: 117 CM/SEC
BH CV ECHO MEAS - MV P1/2T: 100.8 MSEC
BH CV ECHO MEAS - MV V2 MAX: 116 CM/SEC
BH CV ECHO MEAS - MV V2 MEAN: 58.3 CM/SEC
BH CV ECHO MEAS - MV V2 VTI: 35.6 CM
BH CV ECHO MEAS - MVA P1/2T LCG: 1.9 CM^2
BH CV ECHO MEAS - MVA(P1/2T): 2.2 CM^2
BH CV ECHO MEAS - MVA(VTI): 1.5 CM^2
BH CV ECHO MEAS - PA ACC TIME: 0.1 SEC
BH CV ECHO MEAS - PA MAX PG (FULL): 0.49 MMHG
BH CV ECHO MEAS - PA MAX PG: 1.5 MMHG
BH CV ECHO MEAS - PA PR(ACCEL): 34.9 MMHG
BH CV ECHO MEAS - PA V2 MAX: 62.1 CM/SEC
BH CV ECHO MEAS - PULM A REVS DUR: 0.14 SEC
BH CV ECHO MEAS - PULM A REVS VEL: 22 CM/SEC
BH CV ECHO MEAS - PULM DIAS VEL: 75.7 CM/SEC
BH CV ECHO MEAS - PULM S/D: 0.55
BH CV ECHO MEAS - PULM SYS VEL: 41.3 CM/SEC
BH CV ECHO MEAS - PVA(V,A): 3.7 CM^2
BH CV ECHO MEAS - PVA(V,D): 3.7 CM^2
BH CV ECHO MEAS - QP/QS: 1
BH CV ECHO MEAS - RAP SYSTOLE: 15 MMHG
BH CV ECHO MEAS - RV MAX PG: 1.1 MMHG
BH CV ECHO MEAS - RV MEAN PG: 0 MMHG
BH CV ECHO MEAS - RV V1 MAX: 51.4 CM/SEC
BH CV ECHO MEAS - RV V1 MEAN: 29.8 CM/SEC
BH CV ECHO MEAS - RV V1 VTI: 11.7 CM
BH CV ECHO MEAS - RVOT AREA: 4.5 CM^2
BH CV ECHO MEAS - RVOT DIAM: 2.4 CM
BH CV ECHO MEAS - RVSP: 82 MMHG
BH CV ECHO MEAS - SI(AO): 115.4 ML/M^2
BH CV ECHO MEAS - SI(CUBED): 17.5 ML/M^2
BH CV ECHO MEAS - SI(LVOT): 26 ML/M^2
BH CV ECHO MEAS - SI(MOD-SP2): 22.4 ML/M^2
BH CV ECHO MEAS - SI(MOD-SP4): 20.9 ML/M^2
BH CV ECHO MEAS - SI(TEICH): 17.4 ML/M^2
BH CV ECHO MEAS - SV(AO): 231.7 ML
BH CV ECHO MEAS - SV(CUBED): 35.2 ML
BH CV ECHO MEAS - SV(LVOT): 52.2 ML
BH CV ECHO MEAS - SV(MOD-SP2): 45 ML
BH CV ECHO MEAS - SV(MOD-SP4): 42 ML
BH CV ECHO MEAS - SV(RVOT): 52.9 ML
BH CV ECHO MEAS - SV(TEICH): 34.9 ML
BH CV ECHO MEAS - TAPSE (>1.6): 1.4 CM2
BH CV ECHO MEAS - TR MAX PG: 67
BH CV ECHO MEAS - TR MAX VEL: 460 CM/SEC
BH CV ECHO MEASUREMENTS AVERAGE E/E' RATIO: 20.73
BH CV VAS BP RIGHT ARM: NORMAL MMHG
BH CV XLRA - RV BASE: 3.8 CM
BH CV XLRA - TDI S': 10 CM/SEC
BH CV XLRA MEAS - DIST GSV CALF DIST LEFT: 0.29 CM
BH CV XLRA MEAS - DIST GSV CALF DIST RIGHT: 0.23 CM
BH CV XLRA MEAS - DIST GSV THIGH DIST LEFT: 0.4 CM
BH CV XLRA MEAS - DIST GSV THIGH DIST RIGHT: 0.27 CM
BH CV XLRA MEAS - GSV ANKLE DIST LEFT: 0.28 CM
BH CV XLRA MEAS - GSV ANKLE DIST RIGHT: 0.29 CM
BH CV XLRA MEAS - GSV KNEE DIST LEFT: 0.33 CM
BH CV XLRA MEAS - GSV KNEE DIST RIGHT: 0.29 CM
BH CV XLRA MEAS - GSV ORIGIN DIST LEFT: 0.45 CM
BH CV XLRA MEAS - GSV ORIGIN DIST RIGHT: 0.34 CM
BH CV XLRA MEAS - MID GSV CALF LEFT: 0.18 CM
BH CV XLRA MEAS - MID GSV CALF RIGHT: 0.27 CM
BH CV XLRA MEAS - MID GSV THIGH  LEFT: 0.31 CM
BH CV XLRA MEAS - MID GSV THIGH  RIGHT: 0.15 CM
BH CV XLRA MEAS - PROX GSV CALF DIST LEFT: 0.22 CM
BH CV XLRA MEAS - PROX GSV CALF DIST RIGHT: 0.26 CM
BH CV XLRA MEAS - PROX GSV THIGH  LEFT: 0.35 CM
BH CV XLRA MEAS - PROX GSV THIGH  RIGHT: 0.24 CM
BH CV XLRA MEAS LEFT CCA RATIO VEL: 66.8 CM/SEC
BH CV XLRA MEAS LEFT DIST CCA EDV: 9.4 CM/SEC
BH CV XLRA MEAS LEFT DIST CCA PSV: 66.8 CM/SEC
BH CV XLRA MEAS LEFT DIST ICA EDV: -15.2 CM/SEC
BH CV XLRA MEAS LEFT DIST ICA PSV: -59.5 CM/SEC
BH CV XLRA MEAS LEFT ICA RATIO VEL: -82.7 CM/SEC
BH CV XLRA MEAS LEFT ICA/CCA RATIO: -1.2
BH CV XLRA MEAS LEFT MID ICA EDV: -17 CM/SEC
BH CV XLRA MEAS LEFT MID ICA PSV: -48.8 CM/SEC
BH CV XLRA MEAS LEFT PROX CCA EDV: 13.5 CM/SEC
BH CV XLRA MEAS LEFT PROX CCA PSV: 88.5 CM/SEC
BH CV XLRA MEAS LEFT PROX ECA EDV: -8.8 CM/SEC
BH CV XLRA MEAS LEFT PROX ECA PSV: -89.1 CM/SEC
BH CV XLRA MEAS LEFT PROX ICA EDV: -19.9 CM/SEC
BH CV XLRA MEAS LEFT PROX ICA PSV: -82.7 CM/SEC
BH CV XLRA MEAS LEFT PROX SCLA PSV: 136 CM/SEC
BH CV XLRA MEAS LEFT VERTEBRAL A PSV: 0 CM/SEC
BH CV XLRA MEAS RIGHT CCA RATIO VEL: 62.1 CM/SEC
BH CV XLRA MEAS RIGHT DIST CCA EDV: 10 CM/SEC
BH CV XLRA MEAS RIGHT DIST CCA PSV: 62.1 CM/SEC
BH CV XLRA MEAS RIGHT DIST ICA EDV: -19.9 CM/SEC
BH CV XLRA MEAS RIGHT DIST ICA PSV: -74.5 CM/SEC
BH CV XLRA MEAS RIGHT ICA RATIO VEL: -92.7 CM/SEC
BH CV XLRA MEAS RIGHT ICA/CCA RATIO: -1.5
BH CV XLRA MEAS RIGHT MID ICA EDV: -19.3 CM/SEC
BH CV XLRA MEAS RIGHT MID ICA PSV: -83.8 CM/SEC
BH CV XLRA MEAS RIGHT PROX CCA EDV: 10 CM/SEC
BH CV XLRA MEAS RIGHT PROX CCA PSV: 69.8 CM/SEC
BH CV XLRA MEAS RIGHT PROX ECA EDV: -10.5 CM/SEC
BH CV XLRA MEAS RIGHT PROX ECA PSV: -208 CM/SEC
BH CV XLRA MEAS RIGHT PROX ICA EDV: -13.3 CM/SEC
BH CV XLRA MEAS RIGHT PROX ICA PSV: -92.7 CM/SEC
BH CV XLRA MEAS RIGHT PROX SCLA EDV: 13.8 CM/SEC
BH CV XLRA MEAS RIGHT PROX SCLA PSV: 153 CM/SEC
BH CV XLRA MEAS RIGHT VERTEBRAL A EDV: 19.3 CM/SEC
BH CV XLRA MEAS RIGHT VERTEBRAL A PSV: 85 CM/SEC
BILIRUB SERPL-MCNC: 0.8 MG/DL (ref 0.2–1.2)
BUN BLD-MCNC: 21 MG/DL (ref 8–23)
BUN/CREAT SERPL: 22.8 (ref 7–25)
CALCIUM SPEC-SCNC: 8.5 MG/DL (ref 8.6–10.5)
CHLORIDE SERPL-SCNC: 102 MMOL/L (ref 98–107)
CLOSE TME COLL+ADP + EPINEP PNL BLD: 89 %
CO2 SERPL-SCNC: 27.9 MMOL/L (ref 22–29)
CREAT BLD-MCNC: 0.92 MG/DL (ref 0.76–1.27)
DEPRECATED RDW RBC AUTO: 52.3 FL (ref 37–54)
ERYTHROCYTE [DISTWIDTH] IN BLOOD BY AUTOMATED COUNT: 14.7 % (ref 12.3–15.4)
GFR SERPL CREATININE-BSD FRML MDRD: 81 ML/MIN/1.73
GLOBULIN UR ELPH-MCNC: 2.5 GM/DL
GLUCOSE BLD-MCNC: 100 MG/DL (ref 65–99)
GLUCOSE BLDC GLUCOMTR-MCNC: 101 MG/DL (ref 70–130)
GLUCOSE BLDC GLUCOMTR-MCNC: 126 MG/DL (ref 70–130)
GLUCOSE BLDC GLUCOMTR-MCNC: 153 MG/DL (ref 70–130)
GLUCOSE BLDC GLUCOMTR-MCNC: 99 MG/DL (ref 70–130)
HCT VFR BLD AUTO: 36.6 % (ref 37.5–51)
HGB BLD-MCNC: 11.8 G/DL (ref 13–17.7)
LEFT ARM BP: NORMAL MMHG
LEFT ATRIUM VOLUME INDEX: 26 ML/M2
MAGNESIUM SERPL-MCNC: 2.3 MG/DL (ref 1.6–2.4)
MAXIMAL PREDICTED HEART RATE: 150 BPM
MCH RBC QN AUTO: 31 PG (ref 26.6–33)
MCHC RBC AUTO-ENTMCNC: 32.2 G/DL (ref 31.5–35.7)
MCV RBC AUTO: 96.1 FL (ref 79–97)
PLATELET # BLD AUTO: 168 10*3/MM3 (ref 140–450)
PMV BLD AUTO: 11.2 FL (ref 6–12)
POTASSIUM BLD-SCNC: 3.9 MMOL/L (ref 3.5–5.2)
PROT SERPL-MCNC: 6.1 G/DL (ref 6–8.5)
RBC # BLD AUTO: 3.81 10*6/MM3 (ref 4.14–5.8)
RIGHT ARM BP: NORMAL MMHG
SODIUM BLD-SCNC: 138 MMOL/L (ref 136–145)
STRESS TARGET HR: 128 BPM
TSH SERPL DL<=0.05 MIU/L-ACNC: 0.9 MIU/ML (ref 0.27–4.2)
WBC NRBC COR # BLD: 6.52 10*3/MM3 (ref 3.4–10.8)

## 2019-07-16 PROCEDURE — 97162 PT EVAL MOD COMPLEX 30 MIN: CPT

## 2019-07-16 PROCEDURE — 0 GADOBENATE DIMEGLUMINE 529 MG/ML SOLUTION: Performed by: INTERNAL MEDICINE

## 2019-07-16 PROCEDURE — 70544 MR ANGIOGRAPHY HEAD W/O DYE: CPT

## 2019-07-16 PROCEDURE — 93970 EXTREMITY STUDY: CPT

## 2019-07-16 PROCEDURE — G0378 HOSPITAL OBSERVATION PER HR: HCPCS

## 2019-07-16 PROCEDURE — 80053 COMPREHEN METABOLIC PANEL: CPT | Performed by: THORACIC SURGERY (CARDIOTHORACIC VASCULAR SURGERY)

## 2019-07-16 PROCEDURE — 85027 COMPLETE CBC AUTOMATED: CPT | Performed by: THORACIC SURGERY (CARDIOTHORACIC VASCULAR SURGERY)

## 2019-07-16 PROCEDURE — 99233 SBSQ HOSP IP/OBS HIGH 50: CPT | Performed by: NURSE PRACTITIONER

## 2019-07-16 PROCEDURE — A9577 INJ MULTIHANCE: HCPCS | Performed by: INTERNAL MEDICINE

## 2019-07-16 PROCEDURE — 99231 SBSQ HOSP IP/OBS SF/LOW 25: CPT | Performed by: PSYCHIATRY & NEUROLOGY

## 2019-07-16 PROCEDURE — 93306 TTE W/DOPPLER COMPLETE: CPT

## 2019-07-16 PROCEDURE — 82962 GLUCOSE BLOOD TEST: CPT

## 2019-07-16 PROCEDURE — 70549 MR ANGIOGRAPH NECK W/O&W/DYE: CPT

## 2019-07-16 PROCEDURE — 99232 SBSQ HOSP IP/OBS MODERATE 35: CPT | Performed by: NURSE PRACTITIONER

## 2019-07-16 PROCEDURE — 83735 ASSAY OF MAGNESIUM: CPT | Performed by: THORACIC SURGERY (CARDIOTHORACIC VASCULAR SURGERY)

## 2019-07-16 PROCEDURE — 70553 MRI BRAIN STEM W/O & W/DYE: CPT

## 2019-07-16 PROCEDURE — 93306 TTE W/DOPPLER COMPLETE: CPT | Performed by: INTERNAL MEDICINE

## 2019-07-16 PROCEDURE — 93880 EXTRACRANIAL BILAT STUDY: CPT

## 2019-07-16 PROCEDURE — 84443 ASSAY THYROID STIM HORMONE: CPT | Performed by: THORACIC SURGERY (CARDIOTHORACIC VASCULAR SURGERY)

## 2019-07-16 PROCEDURE — 85576 BLOOD PLATELET AGGREGATION: CPT | Performed by: THORACIC SURGERY (CARDIOTHORACIC VASCULAR SURGERY)

## 2019-07-16 RX ORDER — HYDRALAZINE HYDROCHLORIDE 25 MG/1
25 TABLET, FILM COATED ORAL ONCE
Status: COMPLETED | OUTPATIENT
Start: 2019-07-17 | End: 2019-07-17

## 2019-07-16 RX ADMIN — Medication 10 MG: at 21:21

## 2019-07-16 RX ADMIN — TAMSULOSIN HYDROCHLORIDE 0.4 MG: 0.4 CAPSULE ORAL at 09:31

## 2019-07-16 RX ADMIN — SODIUM CHLORIDE, PRESERVATIVE FREE 3 ML: 5 INJECTION INTRAVENOUS at 09:31

## 2019-07-16 RX ADMIN — HYDRALAZINE HYDROCHLORIDE 25 MG: 25 TABLET, FILM COATED ORAL at 16:25

## 2019-07-16 RX ADMIN — METOPROLOL SUCCINATE 25 MG: 25 TABLET, FILM COATED, EXTENDED RELEASE ORAL at 09:31

## 2019-07-16 RX ADMIN — HYDRALAZINE HYDROCHLORIDE 25 MG: 25 TABLET, FILM COATED ORAL at 21:18

## 2019-07-16 RX ADMIN — PAROXETINE HYDROCHLORIDE 25 MG: 25 TABLET, FILM COATED, EXTENDED RELEASE ORAL at 21:18

## 2019-07-16 RX ADMIN — SODIUM CHLORIDE, PRESERVATIVE FREE 10 ML: 5 INJECTION INTRAVENOUS at 21:18

## 2019-07-16 RX ADMIN — ISOSORBIDE MONONITRATE 60 MG: 60 TABLET ORAL at 09:31

## 2019-07-16 RX ADMIN — GADOBENATE DIMEGLUMINE 17 ML: 529 INJECTION, SOLUTION INTRAVENOUS at 13:09

## 2019-07-16 RX ADMIN — HYDRALAZINE HYDROCHLORIDE 25 MG: 25 TABLET, FILM COATED ORAL at 06:10

## 2019-07-17 ENCOUNTER — ANESTHESIA EVENT (OUTPATIENT)
Dept: PERIOP | Facility: HOSPITAL | Age: 69
End: 2019-07-17

## 2019-07-17 LAB
ABO GROUP BLD: NORMAL
ANION GAP SERPL CALCULATED.3IONS-SCNC: 8.1 MMOL/L (ref 5–15)
ARTERIAL PATENCY WRIST A: POSITIVE
ATMOSPHERIC PRESS: 746.9 MMHG
BASE EXCESS BLDA CALC-SCNC: 2.6 MMOL/L (ref 0–2)
BDY SITE: ABNORMAL
BILIRUB UR QL STRIP: NEGATIVE
BLD GP AB SCN SERPL QL: NEGATIVE
BUN BLD-MCNC: 18 MG/DL (ref 8–23)
BUN/CREAT SERPL: 19.1 (ref 7–25)
CALCIUM SPEC-SCNC: 8.7 MG/DL (ref 8.6–10.5)
CHLORIDE SERPL-SCNC: 102 MMOL/L (ref 98–107)
CLARITY UR: CLEAR
CO2 SERPL-SCNC: 27.9 MMOL/L (ref 22–29)
COLOR UR: YELLOW
CREAT BLD-MCNC: 0.94 MG/DL (ref 0.76–1.27)
GFR SERPL CREATININE-BSD FRML MDRD: 79 ML/MIN/1.73
GLUCOSE BLD-MCNC: 125 MG/DL (ref 65–99)
GLUCOSE BLDC GLUCOMTR-MCNC: 111 MG/DL (ref 70–130)
GLUCOSE BLDC GLUCOMTR-MCNC: 118 MG/DL (ref 70–130)
GLUCOSE BLDC GLUCOMTR-MCNC: 123 MG/DL (ref 70–130)
GLUCOSE BLDC GLUCOMTR-MCNC: 160 MG/DL (ref 70–130)
GLUCOSE UR STRIP-MCNC: NEGATIVE MG/DL
HCO3 BLDA-SCNC: 28.2 MMOL/L (ref 22–28)
HGB UR QL STRIP.AUTO: NEGATIVE
HOROWITZ INDEX BLD+IHG-RTO: 21 %
KETONES UR QL STRIP: NEGATIVE
LEUKOCYTE ESTERASE UR QL STRIP.AUTO: NEGATIVE
MODALITY: ABNORMAL
NITRITE UR QL STRIP: NEGATIVE
O2 A-A PPRESDIFF RESPIRATORY: 0.6 MMHG
PCO2 BLDA: 47.1 MM HG (ref 35–45)
PH BLDA: 7.39 PH UNITS (ref 7.35–7.45)
PH UR STRIP.AUTO: 6 [PH] (ref 5–8)
PO2 BLDA: 59.4 MM HG (ref 80–100)
POTASSIUM BLD-SCNC: 4.3 MMOL/L (ref 3.5–5.2)
PROT UR QL STRIP: NEGATIVE
RH BLD: POSITIVE
SAO2 % BLDCOA: 89.6 % (ref 92–99)
SODIUM BLD-SCNC: 138 MMOL/L (ref 136–145)
SP GR UR STRIP: 1.01 (ref 1–1.03)
T&S EXPIRATION DATE: NORMAL
TOTAL RATE: 18 BREATHS/MINUTE
UROBILINOGEN UR QL STRIP: NORMAL

## 2019-07-17 PROCEDURE — 82962 GLUCOSE BLOOD TEST: CPT

## 2019-07-17 PROCEDURE — 80048 BASIC METABOLIC PNL TOTAL CA: CPT | Performed by: INTERNAL MEDICINE

## 2019-07-17 PROCEDURE — 99226 PR SBSQ OBSERVATION CARE/DAY 35 MINUTES: CPT | Performed by: INTERNAL MEDICINE

## 2019-07-17 PROCEDURE — 82803 BLOOD GASES ANY COMBINATION: CPT

## 2019-07-17 PROCEDURE — 86900 BLOOD TYPING SEROLOGIC ABO: CPT

## 2019-07-17 PROCEDURE — 99233 SBSQ HOSP IP/OBS HIGH 50: CPT | Performed by: NURSE PRACTITIONER

## 2019-07-17 PROCEDURE — 36600 WITHDRAWAL OF ARTERIAL BLOOD: CPT

## 2019-07-17 PROCEDURE — 84244 ASSAY OF RENIN: CPT | Performed by: INTERNAL MEDICINE

## 2019-07-17 PROCEDURE — 63710000001 INSULIN LISPRO (HUMAN) PER 5 UNITS: Performed by: INTERNAL MEDICINE

## 2019-07-17 PROCEDURE — 86923 COMPATIBILITY TEST ELECTRIC: CPT

## 2019-07-17 PROCEDURE — 86901 BLOOD TYPING SEROLOGIC RH(D): CPT | Performed by: NURSE PRACTITIONER

## 2019-07-17 PROCEDURE — 82088 ASSAY OF ALDOSTERONE: CPT | Performed by: INTERNAL MEDICINE

## 2019-07-17 PROCEDURE — 97110 THERAPEUTIC EXERCISES: CPT

## 2019-07-17 PROCEDURE — 83835 ASSAY OF METANEPHRINES: CPT | Performed by: INTERNAL MEDICINE

## 2019-07-17 PROCEDURE — 81003 URINALYSIS AUTO W/O SCOPE: CPT | Performed by: NURSE PRACTITIONER

## 2019-07-17 PROCEDURE — 86900 BLOOD TYPING SEROLOGIC ABO: CPT | Performed by: NURSE PRACTITIONER

## 2019-07-17 PROCEDURE — 86901 BLOOD TYPING SEROLOGIC RH(D): CPT

## 2019-07-17 PROCEDURE — 86850 RBC ANTIBODY SCREEN: CPT | Performed by: NURSE PRACTITIONER

## 2019-07-17 PROCEDURE — 25010000002 HYDRALAZINE PER 20 MG: Performed by: INTERNAL MEDICINE

## 2019-07-17 RX ORDER — CHLORHEXIDINE GLUCONATE 500 MG/1
1 CLOTH TOPICAL EVERY 12 HOURS
Status: COMPLETED | OUTPATIENT
Start: 2019-07-17 | End: 2019-07-18

## 2019-07-17 RX ORDER — HYDRALAZINE HYDROCHLORIDE 20 MG/ML
20 INJECTION INTRAMUSCULAR; INTRAVENOUS EVERY 4 HOURS PRN
Status: DISCONTINUED | OUTPATIENT
Start: 2019-07-17 | End: 2019-07-18

## 2019-07-17 RX ORDER — CEFAZOLIN SODIUM 2 G/100ML
2 INJECTION, SOLUTION INTRAVENOUS
Status: COMPLETED | OUTPATIENT
Start: 2019-07-18 | End: 2019-07-18

## 2019-07-17 RX ORDER — CHLORHEXIDINE GLUCONATE 0.12 MG/ML
15 RINSE ORAL EVERY 12 HOURS SCHEDULED
Status: COMPLETED | OUTPATIENT
Start: 2019-07-17 | End: 2019-07-18

## 2019-07-17 RX ORDER — HYDRALAZINE HYDROCHLORIDE 20 MG/ML
20 INJECTION INTRAMUSCULAR; INTRAVENOUS EVERY 6 HOURS PRN
Status: DISCONTINUED | OUTPATIENT
Start: 2019-07-17 | End: 2019-07-17

## 2019-07-17 RX ORDER — GABAPENTIN 100 MG/1
100 CAPSULE ORAL EVERY 8 HOURS SCHEDULED
Status: DISCONTINUED | OUTPATIENT
Start: 2019-07-17 | End: 2019-07-18

## 2019-07-17 RX ADMIN — CHLORHEXIDINE GLUCONATE 15 ML: 1.2 RINSE ORAL at 22:23

## 2019-07-17 RX ADMIN — HYDRALAZINE HYDROCHLORIDE 25 MG: 25 TABLET, FILM COATED ORAL at 22:25

## 2019-07-17 RX ADMIN — GABAPENTIN 100 MG: 100 CAPSULE ORAL at 14:15

## 2019-07-17 RX ADMIN — Medication 10 MG: at 22:27

## 2019-07-17 RX ADMIN — SODIUM CHLORIDE, PRESERVATIVE FREE 3 ML: 5 INJECTION INTRAVENOUS at 08:40

## 2019-07-17 RX ADMIN — HYDRALAZINE HYDROCHLORIDE 25 MG: 25 TABLET, FILM COATED ORAL at 02:02

## 2019-07-17 RX ADMIN — MUPIROCIN 1 APPLICATION: 20 OINTMENT TOPICAL at 22:26

## 2019-07-17 RX ADMIN — TAMSULOSIN HYDROCHLORIDE 0.4 MG: 0.4 CAPSULE ORAL at 08:40

## 2019-07-17 RX ADMIN — HYDROCODONE BITARTRATE AND ACETAMINOPHEN 1 TABLET: 5; 325 TABLET ORAL at 02:02

## 2019-07-17 RX ADMIN — ISOSORBIDE MONONITRATE 60 MG: 60 TABLET ORAL at 08:40

## 2019-07-17 RX ADMIN — HYDRALAZINE HYDROCHLORIDE 25 MG: 25 TABLET, FILM COATED ORAL at 05:07

## 2019-07-17 RX ADMIN — HYDRALAZINE HYDROCHLORIDE 20 MG: 20 INJECTION INTRAMUSCULAR; INTRAVENOUS at 11:29

## 2019-07-17 RX ADMIN — METOPROLOL SUCCINATE 25 MG: 25 TABLET, FILM COATED, EXTENDED RELEASE ORAL at 08:40

## 2019-07-17 RX ADMIN — INSULIN LISPRO 2 UNITS: 100 INJECTION, SOLUTION INTRAVENOUS; SUBCUTANEOUS at 22:27

## 2019-07-17 RX ADMIN — HYDRALAZINE HYDROCHLORIDE 25 MG: 25 TABLET, FILM COATED ORAL at 14:17

## 2019-07-17 RX ADMIN — SODIUM CHLORIDE, PRESERVATIVE FREE 3 ML: 5 INJECTION INTRAVENOUS at 22:22

## 2019-07-17 RX ADMIN — CHLORHEXIDINE GLUCONATE 1 APPLICATION: 500 CLOTH TOPICAL at 21:00

## 2019-07-17 RX ADMIN — GABAPENTIN 100 MG: 100 CAPSULE ORAL at 22:24

## 2019-07-17 RX ADMIN — PAROXETINE HYDROCHLORIDE 25 MG: 25 TABLET, FILM COATED, EXTENDED RELEASE ORAL at 22:22

## 2019-07-17 NOTE — ANESTHESIA PREPROCEDURE EVALUATION
Anesthesia Evaluation                  Airway   Mallampati: III  Dental      Pulmonary - normal exam   (+) a smoker Former, sleep apnea on CPAP,     ROS comment: Positive COCO screen/Positive COCO diagnosis and 2 or more mitigating factors used (recovery in non-supine position and multimodal analgesia)    Cardiovascular - normal exam    (+) hypertension, past MI , CAD, CHF, hyperlipidemia,       Neuro/Psych  (+) CVA, psychiatric history (PTSD) Anxiety,     GI/Hepatic/Renal/Endo    (+)   diabetes mellitus type 2,     Musculoskeletal     Abdominal    Substance History      OB/GYN          Other                        Anesthesia Plan    ASA 4     general     Anesthetic plan, all risks, benefits, and alternatives have been provided, discussed and informed consent has been obtained with: patient.

## 2019-07-18 ENCOUNTER — ANESTHESIA (OUTPATIENT)
Dept: PERIOP | Facility: HOSPITAL | Age: 69
End: 2019-07-18

## 2019-07-18 ENCOUNTER — APPOINTMENT (OUTPATIENT)
Dept: GENERAL RADIOLOGY | Facility: HOSPITAL | Age: 69
End: 2019-07-18

## 2019-07-18 ENCOUNTER — APPOINTMENT (OUTPATIENT)
Dept: SLEEP MEDICINE | Facility: HOSPITAL | Age: 69
End: 2019-07-18
Attending: INTERNAL MEDICINE

## 2019-07-18 ENCOUNTER — ANCILLARY PROCEDURE (OUTPATIENT)
Dept: PERIOP | Facility: HOSPITAL | Age: 69
End: 2019-07-18

## 2019-07-18 LAB
ACT BLD: 142 SECONDS (ref 82–152)
ACT BLD: 147 SECONDS (ref 82–152)
ACT BLD: 390 SECONDS (ref 82–152)
ACT BLD: 395 SECONDS (ref 82–152)
ACT BLD: 477 SECONDS (ref 82–152)
ACT BLD: 477 SECONDS (ref 82–152)
ACT BLD: 499 SECONDS (ref 82–152)
ALBUMIN SERPL-MCNC: 3.5 G/DL (ref 3.5–5.2)
ALBUMIN SERPL-MCNC: 3.7 G/DL (ref 3.5–5.2)
ALBUMIN SERPL-MCNC: 4.2 G/DL (ref 3.5–5.2)
ALBUMIN SERPL-MCNC: 4.2 G/DL (ref 3.5–5.2)
ALBUMIN/GLOB SERPL: 1.3 G/DL
ALP SERPL-CCNC: 64 U/L (ref 39–117)
ALT SERPL W P-5'-P-CCNC: 60 U/L (ref 1–41)
ANION GAP SERPL CALCULATED.3IONS-SCNC: 10.3 MMOL/L (ref 5–15)
ANION GAP SERPL CALCULATED.3IONS-SCNC: 11.4 MMOL/L (ref 5–15)
ANION GAP SERPL CALCULATED.3IONS-SCNC: 9.5 MMOL/L (ref 5–15)
ANION GAP SERPL CALCULATED.3IONS-SCNC: 9.7 MMOL/L (ref 5–15)
APTT PPP: 38.4 SECONDS (ref 22.7–35.4)
APTT PPP: 39 SECONDS (ref 22.7–35.4)
ARTERIAL PATENCY WRIST A: ABNORMAL
AST SERPL-CCNC: 31 U/L (ref 1–40)
ATMOSPHERIC PRESS: 745.8 MMHG
ATMOSPHERIC PRESS: 746.6 MMHG
ATMOSPHERIC PRESS: 747 MMHG
ATMOSPHERIC PRESS: 748.9 MMHG
BASE EXCESS BLDA CALC-SCNC: -0.9 MMOL/L (ref 0–2)
BASE EXCESS BLDA CALC-SCNC: 1.2 MMOL/L (ref 0–2)
BASE EXCESS BLDA CALC-SCNC: 1.5 MMOL/L (ref 0–2)
BASE EXCESS BLDA CALC-SCNC: 2.2 MMOL/L (ref 0–2)
BASE EXCESS BLDA CALC-SCNC: 3 MMOL/L (ref -5–5)
BASE EXCESS BLDA CALC-SCNC: 3 MMOL/L (ref -5–5)
BASE EXCESS BLDA CALC-SCNC: 4 MMOL/L (ref -5–5)
BASE EXCESS BLDA CALC-SCNC: 5 MMOL/L (ref -5–5)
BASE EXCESS BLDA CALC-SCNC: 8 MMOL/L (ref -5–5)
BASE EXCESS BLDA CALC-SCNC: 9 MMOL/L (ref -5–5)
BASOPHILS # BLD AUTO: 0.03 10*3/MM3 (ref 0–0.2)
BASOPHILS # BLD AUTO: 0.04 10*3/MM3 (ref 0–0.2)
BASOPHILS NFR BLD AUTO: 0.2 % (ref 0–1.5)
BASOPHILS NFR BLD AUTO: 0.6 % (ref 0–1.5)
BDY SITE: ABNORMAL
BILIRUB SERPL-MCNC: 0.6 MG/DL (ref 0.2–1.2)
BUN BLD-MCNC: 15 MG/DL (ref 8–23)
BUN BLD-MCNC: 16 MG/DL (ref 8–23)
BUN BLD-MCNC: 16 MG/DL (ref 8–23)
BUN BLD-MCNC: 18 MG/DL (ref 8–23)
BUN/CREAT SERPL: 14 (ref 7–25)
BUN/CREAT SERPL: 14.4 (ref 7–25)
BUN/CREAT SERPL: 17 (ref 7–25)
BUN/CREAT SERPL: 18 (ref 7–25)
CA-I BLD-MCNC: 4.6 MG/DL (ref 4.6–5.4)
CA-I SERPL ISE-MCNC: 1.14 MMOL/L (ref 1.15–1.35)
CALCIUM SPEC-SCNC: 7.4 MG/DL (ref 8.6–10.5)
CALCIUM SPEC-SCNC: 7.5 MG/DL (ref 8.6–10.5)
CALCIUM SPEC-SCNC: 7.7 MG/DL (ref 8.6–10.5)
CALCIUM SPEC-SCNC: 8.9 MG/DL (ref 8.6–10.5)
CHLORIDE SERPL-SCNC: 101 MMOL/L (ref 98–107)
CHLORIDE SERPL-SCNC: 103 MMOL/L (ref 98–107)
CHLORIDE SERPL-SCNC: 108 MMOL/L (ref 98–107)
CHLORIDE SERPL-SCNC: 111 MMOL/L (ref 98–107)
CO2 BLDA-SCNC: 29 MMOL/L (ref 24–29)
CO2 BLDA-SCNC: 30 MMOL/L (ref 24–29)
CO2 BLDA-SCNC: 30 MMOL/L (ref 24–29)
CO2 BLDA-SCNC: 31 MMOL/L (ref 24–29)
CO2 BLDA-SCNC: 32 MMOL/L (ref 24–29)
CO2 BLDA-SCNC: 34 MMOL/L (ref 24–29)
CO2 SERPL-SCNC: 24.6 MMOL/L (ref 22–29)
CO2 SERPL-SCNC: 24.7 MMOL/L (ref 22–29)
CO2 SERPL-SCNC: 26.3 MMOL/L (ref 22–29)
CO2 SERPL-SCNC: 27.5 MMOL/L (ref 22–29)
CREAT BLD-MCNC: 0.89 MG/DL (ref 0.76–1.27)
CREAT BLD-MCNC: 0.94 MG/DL (ref 0.76–1.27)
CREAT BLD-MCNC: 1.07 MG/DL (ref 0.76–1.27)
CREAT BLD-MCNC: 1.25 MG/DL (ref 0.76–1.27)
DEPRECATED RDW RBC AUTO: 50.4 FL (ref 37–54)
DEPRECATED RDW RBC AUTO: 50.9 FL (ref 37–54)
DEPRECATED RDW RBC AUTO: 50.9 FL (ref 37–54)
DEPRECATED RDW RBC AUTO: 51.1 FL (ref 37–54)
EOSINOPHIL # BLD AUTO: 0.18 10*3/MM3 (ref 0–0.4)
EOSINOPHIL # BLD AUTO: 0.2 10*3/MM3 (ref 0–0.4)
EOSINOPHIL NFR BLD AUTO: 1.5 % (ref 0.3–6.2)
EOSINOPHIL NFR BLD AUTO: 2.8 % (ref 0.3–6.2)
ERYTHROCYTE [DISTWIDTH] IN BLOOD BY AUTOMATED COUNT: 14.1 % (ref 12.3–15.4)
ERYTHROCYTE [DISTWIDTH] IN BLOOD BY AUTOMATED COUNT: 14.4 % (ref 12.3–15.4)
FIBRINOGEN PPP-MCNC: 201 MG/DL (ref 219–464)
FIBRINOGEN PPP-MCNC: 221 MG/DL (ref 219–464)
GFR SERPL CREATININE-BSD FRML MDRD: 57 ML/MIN/1.73
GFR SERPL CREATININE-BSD FRML MDRD: 68 ML/MIN/1.73
GFR SERPL CREATININE-BSD FRML MDRD: 79 ML/MIN/1.73
GFR SERPL CREATININE-BSD FRML MDRD: 85 ML/MIN/1.73
GLOBULIN UR ELPH-MCNC: 2.7 GM/DL
GLUCOSE BLD-MCNC: 147 MG/DL (ref 65–99)
GLUCOSE BLD-MCNC: 62 MG/DL (ref 65–99)
GLUCOSE BLD-MCNC: 89 MG/DL (ref 65–99)
GLUCOSE BLD-MCNC: 89 MG/DL (ref 65–99)
GLUCOSE BLDC GLUCOMTR-MCNC: 100 MG/DL (ref 70–130)
GLUCOSE BLDC GLUCOMTR-MCNC: 112 MG/DL (ref 70–130)
GLUCOSE BLDC GLUCOMTR-MCNC: 118 MG/DL (ref 70–130)
GLUCOSE BLDC GLUCOMTR-MCNC: 139 MG/DL (ref 70–130)
GLUCOSE BLDC GLUCOMTR-MCNC: 60 MG/DL (ref 70–130)
GLUCOSE BLDC GLUCOMTR-MCNC: 69 MG/DL (ref 70–130)
GLUCOSE BLDC GLUCOMTR-MCNC: 71 MG/DL (ref 70–130)
GLUCOSE BLDC GLUCOMTR-MCNC: 73 MG/DL (ref 70–130)
GLUCOSE BLDC GLUCOMTR-MCNC: 81 MG/DL (ref 70–130)
GLUCOSE BLDC GLUCOMTR-MCNC: 82 MG/DL (ref 70–130)
GLUCOSE BLDC GLUCOMTR-MCNC: 89 MG/DL (ref 70–130)
GLUCOSE BLDC GLUCOMTR-MCNC: 90 MG/DL (ref 70–130)
GLUCOSE BLDC GLUCOMTR-MCNC: 95 MG/DL (ref 70–130)
GLUCOSE BLDC GLUCOMTR-MCNC: 96 MG/DL (ref 70–130)
GLUCOSE BLDC GLUCOMTR-MCNC: 97 MG/DL (ref 70–130)
HCO3 BLDA-SCNC: 24.5 MMOL/L (ref 22–28)
HCO3 BLDA-SCNC: 25.5 MMOL/L (ref 22–28)
HCO3 BLDA-SCNC: 25.7 MMOL/L (ref 22–28)
HCO3 BLDA-SCNC: 26.9 MMOL/L (ref 22–28)
HCO3 BLDA-SCNC: 27.5 MMOL/L (ref 22–26)
HCO3 BLDA-SCNC: 28.7 MMOL/L (ref 22–26)
HCO3 BLDA-SCNC: 28.9 MMOL/L (ref 22–26)
HCO3 BLDA-SCNC: 29.2 MMOL/L (ref 22–26)
HCO3 BLDA-SCNC: 30.9 MMOL/L (ref 22–26)
HCO3 BLDA-SCNC: 32.3 MMOL/L (ref 22–26)
HCT VFR BLD AUTO: 26.6 % (ref 37.5–51)
HCT VFR BLD AUTO: 29.8 % (ref 37.5–51)
HCT VFR BLD AUTO: 30.1 % (ref 37.5–51)
HCT VFR BLD AUTO: 36.9 % (ref 37.5–51)
HCT VFR BLDA CALC: 26 % (ref 38–51)
HCT VFR BLDA CALC: 27 % (ref 38–51)
HCT VFR BLDA CALC: 27 % (ref 38–51)
HCT VFR BLDA CALC: 29 % (ref 38–51)
HCT VFR BLDA CALC: 29 % (ref 38–51)
HCT VFR BLDA CALC: 37 % (ref 38–51)
HGB BLD-MCNC: 11.8 G/DL (ref 13–17.7)
HGB BLD-MCNC: 8.5 G/DL (ref 13–17.7)
HGB BLD-MCNC: 9.6 G/DL (ref 13–17.7)
HGB BLD-MCNC: 9.6 G/DL (ref 13–17.7)
HGB BLDA-MCNC: 12.6 G/DL (ref 12–17)
HGB BLDA-MCNC: 8.8 G/DL (ref 12–17)
HGB BLDA-MCNC: 9.2 G/DL (ref 12–17)
HGB BLDA-MCNC: 9.2 G/DL (ref 12–17)
HGB BLDA-MCNC: 9.9 G/DL (ref 12–17)
HGB BLDA-MCNC: 9.9 G/DL (ref 12–17)
HOROWITZ INDEX BLD+IHG-RTO: 100 %
HOROWITZ INDEX BLD+IHG-RTO: 40 %
IMM GRANULOCYTES # BLD AUTO: 0.02 10*3/MM3 (ref 0–0.05)
IMM GRANULOCYTES # BLD AUTO: 0.1 10*3/MM3 (ref 0–0.05)
IMM GRANULOCYTES NFR BLD AUTO: 0.3 % (ref 0–0.5)
IMM GRANULOCYTES NFR BLD AUTO: 0.8 % (ref 0–0.5)
INR PPP: 1.17 (ref 0.9–1.1)
INR PPP: 1.63 (ref 0.9–1.1)
INR PPP: 1.75 (ref 0.9–1.1)
LYMPHOCYTES # BLD AUTO: 1.16 10*3/MM3 (ref 0.7–3.1)
LYMPHOCYTES # BLD AUTO: 2.13 10*3/MM3 (ref 0.7–3.1)
LYMPHOCYTES NFR BLD AUTO: 16 % (ref 19.6–45.3)
LYMPHOCYTES NFR BLD AUTO: 18 % (ref 19.6–45.3)
MAGNESIUM SERPL-MCNC: 2.4 MG/DL (ref 1.6–2.4)
MAGNESIUM SERPL-MCNC: 2.6 MG/DL (ref 1.6–2.4)
MAGNESIUM SERPL-MCNC: 2.7 MG/DL (ref 1.6–2.4)
MCH RBC QN AUTO: 30.7 PG (ref 26.6–33)
MCH RBC QN AUTO: 30.8 PG (ref 26.6–33)
MCH RBC QN AUTO: 31.2 PG (ref 26.6–33)
MCH RBC QN AUTO: 31.2 PG (ref 26.6–33)
MCHC RBC AUTO-ENTMCNC: 31.9 G/DL (ref 31.5–35.7)
MCHC RBC AUTO-ENTMCNC: 32 G/DL (ref 31.5–35.7)
MCHC RBC AUTO-ENTMCNC: 32 G/DL (ref 31.5–35.7)
MCHC RBC AUTO-ENTMCNC: 32.2 G/DL (ref 31.5–35.7)
MCV RBC AUTO: 96 FL (ref 79–97)
MCV RBC AUTO: 96.5 FL (ref 79–97)
MCV RBC AUTO: 96.8 FL (ref 79–97)
MCV RBC AUTO: 97.6 FL (ref 79–97)
MODALITY: ABNORMAL
MONOCYTES # BLD AUTO: 0.61 10*3/MM3 (ref 0.1–0.9)
MONOCYTES # BLD AUTO: 0.91 10*3/MM3 (ref 0.1–0.9)
MONOCYTES NFR BLD AUTO: 6.8 % (ref 5–12)
MONOCYTES NFR BLD AUTO: 9.4 % (ref 5–12)
NEUTROPHILS # BLD AUTO: 4.45 10*3/MM3 (ref 1.7–7)
NEUTROPHILS # BLD AUTO: 9.95 10*3/MM3 (ref 1.7–7)
NEUTROPHILS NFR BLD AUTO: 68.9 % (ref 42.7–76)
NEUTROPHILS NFR BLD AUTO: 74.7 % (ref 42.7–76)
NRBC BLD AUTO-RTO: 0 /100 WBC (ref 0–0.2)
NRBC BLD AUTO-RTO: 0.1 /100 WBC (ref 0–0.2)
O2 A-A PPRESDIFF RESPIRATORY: 0.1 MMHG
O2 A-A PPRESDIFF RESPIRATORY: 0.2 MMHG
O2 A-A PPRESDIFF RESPIRATORY: 0.3 MMHG
O2 A-A PPRESDIFF RESPIRATORY: 0.3 MMHG
PCO2 BLDA: 31.9 MM HG (ref 35–45)
PCO2 BLDA: 38 MM HG (ref 35–45)
PCO2 BLDA: 40.8 MM HG (ref 35–45)
PCO2 BLDA: 41 MM HG (ref 35–45)
PCO2 BLDA: 42.7 MM HG (ref 35–45)
PCO2 BLDA: 43.5 MM HG (ref 35–45)
PCO2 BLDA: 43.9 MM HG (ref 35–45)
PCO2 BLDA: 45 MM HG (ref 35–45)
PCO2 BLDA: 51.4 MM HG (ref 35–45)
PCO2 BLDA: 52.4 MM HG (ref 35–45)
PEEP RESPIRATORY: 7.5 CM[H2O]
PH BLDA: 7.31 PH UNITS (ref 7.35–7.45)
PH BLDA: 7.35 PH UNITS (ref 7.35–7.6)
PH BLDA: 7.41 PH UNITS (ref 7.35–7.6)
PH BLDA: 7.41 PH UNITS (ref 7.35–7.6)
PH BLDA: 7.42 PH UNITS (ref 7.35–7.45)
PH BLDA: 7.43 PH UNITS (ref 7.35–7.45)
PH BLDA: 7.43 PH UNITS (ref 7.35–7.6)
PH BLDA: 7.49 PH UNITS (ref 7.35–7.45)
PH BLDA: 7.49 PH UNITS (ref 7.35–7.6)
PH BLDA: 7.49 PH UNITS (ref 7.35–7.6)
PHOSPHATE SERPL-MCNC: 1.8 MG/DL (ref 2.5–4.5)
PHOSPHATE SERPL-MCNC: 2.2 MG/DL (ref 2.5–4.5)
PHOSPHATE SERPL-MCNC: 4.6 MG/DL (ref 2.5–4.5)
PLATELET # BLD AUTO: 111 10*3/MM3 (ref 140–450)
PLATELET # BLD AUTO: 114 10*3/MM3 (ref 140–450)
PLATELET # BLD AUTO: 141 10*3/MM3 (ref 140–450)
PLATELET # BLD AUTO: 181 10*3/MM3 (ref 140–450)
PMV BLD AUTO: 10.8 FL (ref 6–12)
PMV BLD AUTO: 10.9 FL (ref 6–12)
PMV BLD AUTO: 11 FL (ref 6–12)
PMV BLD AUTO: 11.3 FL (ref 6–12)
PO2 BLDA: 104.2 MM HG (ref 80–100)
PO2 BLDA: 271 MMHG (ref 80–105)
PO2 BLDA: 374 MMHG (ref 80–105)
PO2 BLDA: 444 MMHG (ref 80–105)
PO2 BLDA: 45 MMHG (ref 80–105)
PO2 BLDA: 451 MMHG (ref 80–105)
PO2 BLDA: 59.3 MM HG (ref 80–100)
PO2 BLDA: 66.6 MM HG (ref 80–100)
PO2 BLDA: 71.8 MM HG (ref 80–100)
PO2 BLDA: 92 MMHG (ref 80–105)
POTASSIUM BLD-SCNC: 3.6 MMOL/L (ref 3.5–5.2)
POTASSIUM BLD-SCNC: 4.1 MMOL/L (ref 3.5–5.2)
POTASSIUM BLD-SCNC: 4.4 MMOL/L (ref 3.5–5.2)
POTASSIUM BLD-SCNC: 4.9 MMOL/L (ref 3.5–5.2)
POTASSIUM BLDA-SCNC: 3.8 MMOL/L (ref 3.5–4.9)
POTASSIUM BLDA-SCNC: 3.9 MMOL/L (ref 3.5–4.9)
POTASSIUM BLDA-SCNC: 3.9 MMOL/L (ref 3.5–4.9)
POTASSIUM BLDA-SCNC: 4 MMOL/L (ref 3.5–4.9)
POTASSIUM BLDA-SCNC: 4 MMOL/L (ref 3.5–4.9)
POTASSIUM BLDA-SCNC: 4.4 MMOL/L (ref 3.5–4.9)
PROT SERPL-MCNC: 6.2 G/DL (ref 6–8.5)
PROTHROMBIN TIME: 14.6 SECONDS (ref 11.7–14.2)
PROTHROMBIN TIME: 19 SECONDS (ref 11.7–14.2)
PROTHROMBIN TIME: 20.1 SECONDS (ref 11.7–14.2)
PSV: 10 CMH2O
RBC # BLD AUTO: 2.77 10*6/MM3 (ref 4.14–5.8)
RBC # BLD AUTO: 3.08 10*6/MM3 (ref 4.14–5.8)
RBC # BLD AUTO: 3.12 10*6/MM3 (ref 4.14–5.8)
RBC # BLD AUTO: 3.78 10*6/MM3 (ref 4.14–5.8)
SAO2 % BLDA: 100 % (ref 95–98)
SAO2 % BLDA: 81 % (ref 95–98)
SAO2 % BLDA: 97 % (ref 95–98)
SAO2 % BLDCOA: 90.6 % (ref 92–99)
SAO2 % BLDCOA: 91.2 % (ref 92–99)
SAO2 % BLDCOA: 94.5 % (ref 92–99)
SAO2 % BLDCOA: 98.5 % (ref 92–99)
SET MECH RESP RATE: 12
SET MECH RESP RATE: 14
SET MECH RESP RATE: 15
SET MECH RESP RATE: 15
SODIUM BLD-SCNC: 137 MMOL/L (ref 136–145)
SODIUM BLD-SCNC: 140 MMOL/L (ref 136–145)
SODIUM BLD-SCNC: 144 MMOL/L (ref 136–145)
SODIUM BLD-SCNC: 146 MMOL/L (ref 136–145)
TOTAL RATE: 12 BREATHS/MINUTE
TOTAL RATE: 14 BREATHS/MINUTE
TOTAL RATE: 15 BREATHS/MINUTE
TOTAL RATE: 15 BREATHS/MINUTE
VENTILATOR MODE: ABNORMAL
VENTILATOR MODE: AC
VT ON VENT VENT: 650 ML
VT ON VENT VENT: 650 ML
VT ON VENT VENT: 700 ML
VT ON VENT VENT: 700 ML
WBC NRBC COR # BLD: 13.32 10*3/MM3 (ref 3.4–10.8)
WBC NRBC COR # BLD: 6.46 10*3/MM3 (ref 3.4–10.8)
WBC NRBC COR # BLD: 6.76 10*3/MM3 (ref 3.4–10.8)
WBC NRBC COR # BLD: 9.29 10*3/MM3 (ref 3.4–10.8)

## 2019-07-18 PROCEDURE — 25010000002 AMIODARONE IN DEXTROSE 5% 360-4.14 MG/200ML-% SOLUTION: Performed by: THORACIC SURGERY (CARDIOTHORACIC VASCULAR SURGERY)

## 2019-07-18 PROCEDURE — C1713 ANCHOR/SCREW BN/BN,TIS/BN: HCPCS | Performed by: THORACIC SURGERY (CARDIOTHORACIC VASCULAR SURGERY)

## 2019-07-18 PROCEDURE — 82803 BLOOD GASES ANY COMBINATION: CPT

## 2019-07-18 PROCEDURE — 85018 HEMOGLOBIN: CPT

## 2019-07-18 PROCEDURE — 25010000002 FENTANYL CITRATE (PF) 100 MCG/2ML SOLUTION: Performed by: ANESTHESIOLOGY

## 2019-07-18 PROCEDURE — 83735 ASSAY OF MAGNESIUM: CPT | Performed by: THORACIC SURGERY (CARDIOTHORACIC VASCULAR SURGERY)

## 2019-07-18 PROCEDURE — 25010000002 NEOSTIGMINE 0.5 MG/ML SOLUTION: Performed by: ANESTHESIOLOGY

## 2019-07-18 PROCEDURE — 25010000002 AMIODARONE IN DEXTROSE 5% 360-4.14 MG/200ML-% SOLUTION: Performed by: ANESTHESIOLOGY

## 2019-07-18 PROCEDURE — 25010000002 ALBUMIN HUMAN 5% PER 50 ML: Performed by: THORACIC SURGERY (CARDIOTHORACIC VASCULAR SURGERY)

## 2019-07-18 PROCEDURE — 33508 ENDOSCOPIC VEIN HARVEST: CPT | Performed by: THORACIC SURGERY (CARDIOTHORACIC VASCULAR SURGERY)

## 2019-07-18 PROCEDURE — 85027 COMPLETE CBC AUTOMATED: CPT | Performed by: THORACIC SURGERY (CARDIOTHORACIC VASCULAR SURGERY)

## 2019-07-18 PROCEDURE — 82947 ASSAY GLUCOSE BLOOD QUANT: CPT

## 2019-07-18 PROCEDURE — 25010000003 POTASSIUM CHLORIDE PER 2 MEQ: Performed by: THORACIC SURGERY (CARDIOTHORACIC VASCULAR SURGERY)

## 2019-07-18 PROCEDURE — 25010000002 PHENYLEPHRINE PER 1 ML: Performed by: ANESTHESIOLOGY

## 2019-07-18 PROCEDURE — 25010000002 MORPHINE PER 10 MG: Performed by: THORACIC SURGERY (CARDIOTHORACIC VASCULAR SURGERY)

## 2019-07-18 PROCEDURE — 94002 VENT MGMT INPAT INIT DAY: CPT

## 2019-07-18 PROCEDURE — 25010000002 MIDAZOLAM PER 1 MG: Performed by: ANESTHESIOLOGY

## 2019-07-18 PROCEDURE — P9047 ALBUMIN (HUMAN), 25%, 50ML: HCPCS

## 2019-07-18 PROCEDURE — 85384 FIBRINOGEN ACTIVITY: CPT | Performed by: THORACIC SURGERY (CARDIOTHORACIC VASCULAR SURGERY)

## 2019-07-18 PROCEDURE — 02100Z9 BYPASS CORONARY ARTERY, ONE ARTERY FROM LEFT INTERNAL MAMMARY, OPEN APPROACH: ICD-10-PCS | Performed by: THORACIC SURGERY (CARDIOTHORACIC VASCULAR SURGERY)

## 2019-07-18 PROCEDURE — 94799 UNLISTED PULMONARY SVC/PX: CPT

## 2019-07-18 PROCEDURE — P9041 ALBUMIN (HUMAN),5%, 50ML: HCPCS | Performed by: THORACIC SURGERY (CARDIOTHORACIC VASCULAR SURGERY)

## 2019-07-18 PROCEDURE — 25010000002 PROPOFOL 10 MG/ML EMULSION: Performed by: ANESTHESIOLOGY

## 2019-07-18 PROCEDURE — 25010000002 HEPARIN (PORCINE) PER 1000 UNITS

## 2019-07-18 PROCEDURE — C1751 CATH, INF, PER/CENT/MIDLINE: HCPCS | Performed by: ANESTHESIOLOGY

## 2019-07-18 PROCEDURE — 93005 ELECTROCARDIOGRAM TRACING: CPT | Performed by: THORACIC SURGERY (CARDIOTHORACIC VASCULAR SURGERY)

## 2019-07-18 PROCEDURE — 5A1221Z PERFORMANCE OF CARDIAC OUTPUT, CONTINUOUS: ICD-10-PCS | Performed by: THORACIC SURGERY (CARDIOTHORACIC VASCULAR SURGERY)

## 2019-07-18 PROCEDURE — 25010000002 MAGNESIUM SULFATE PER 500 MG OF MAGNESIUM: Performed by: ANESTHESIOLOGY

## 2019-07-18 PROCEDURE — 93318 ECHO TRANSESOPHAGEAL INTRAOP: CPT | Performed by: ANESTHESIOLOGY

## 2019-07-18 PROCEDURE — 82330 ASSAY OF CALCIUM: CPT | Performed by: THORACIC SURGERY (CARDIOTHORACIC VASCULAR SURGERY)

## 2019-07-18 PROCEDURE — 25010000002 MAGNESIUM SULFATE IN D5W 1G/100ML (PREMIX) 1-5 GM/100ML-% SOLUTION: Performed by: THORACIC SURGERY (CARDIOTHORACIC VASCULAR SURGERY)

## 2019-07-18 PROCEDURE — 80053 COMPREHEN METABOLIC PANEL: CPT | Performed by: NURSE PRACTITIONER

## 2019-07-18 PROCEDURE — 85610 PROTHROMBIN TIME: CPT | Performed by: THORACIC SURGERY (CARDIOTHORACIC VASCULAR SURGERY)

## 2019-07-18 PROCEDURE — 71045 X-RAY EXAM CHEST 1 VIEW: CPT

## 2019-07-18 PROCEDURE — 25010000003 CEFAZOLIN IN DEXTROSE 2-4 GM/100ML-% SOLUTION: Performed by: THORACIC SURGERY (CARDIOTHORACIC VASCULAR SURGERY)

## 2019-07-18 PROCEDURE — C1729 CATH, DRAINAGE: HCPCS | Performed by: THORACIC SURGERY (CARDIOTHORACIC VASCULAR SURGERY)

## 2019-07-18 PROCEDURE — 06BP4ZZ EXCISION OF RIGHT SAPHENOUS VEIN, PERCUTANEOUS ENDOSCOPIC APPROACH: ICD-10-PCS | Performed by: THORACIC SURGERY (CARDIOTHORACIC VASCULAR SURGERY)

## 2019-07-18 PROCEDURE — 25010000002 PAPAVERINE PER 60 MG: Performed by: THORACIC SURGERY (CARDIOTHORACIC VASCULAR SURGERY)

## 2019-07-18 PROCEDURE — 021109W BYPASS CORONARY ARTERY, TWO ARTERIES FROM AORTA WITH AUTOLOGOUS VENOUS TISSUE, OPEN APPROACH: ICD-10-PCS | Performed by: THORACIC SURGERY (CARDIOTHORACIC VASCULAR SURGERY)

## 2019-07-18 PROCEDURE — 93010 ELECTROCARDIOGRAM REPORT: CPT | Performed by: INTERNAL MEDICINE

## 2019-07-18 PROCEDURE — 85014 HEMATOCRIT: CPT

## 2019-07-18 PROCEDURE — 85730 THROMBOPLASTIN TIME PARTIAL: CPT | Performed by: THORACIC SURGERY (CARDIOTHORACIC VASCULAR SURGERY)

## 2019-07-18 PROCEDURE — 85347 COAGULATION TIME ACTIVATED: CPT

## 2019-07-18 PROCEDURE — 80069 RENAL FUNCTION PANEL: CPT | Performed by: THORACIC SURGERY (CARDIOTHORACIC VASCULAR SURGERY)

## 2019-07-18 PROCEDURE — 33533 CABG ARTERIAL SINGLE: CPT | Performed by: THORACIC SURGERY (CARDIOTHORACIC VASCULAR SURGERY)

## 2019-07-18 PROCEDURE — 33518 CABG ARTERY-VEIN TWO: CPT | Performed by: THORACIC SURGERY (CARDIOTHORACIC VASCULAR SURGERY)

## 2019-07-18 PROCEDURE — 25010000002 ALBUMIN HUMAN 25% PER 50 ML

## 2019-07-18 PROCEDURE — 25010000002 HEPARIN (PORCINE) PER 1000 UNITS: Performed by: THORACIC SURGERY (CARDIOTHORACIC VASCULAR SURGERY)

## 2019-07-18 PROCEDURE — 93318 ECHO TRANSESOPHAGEAL INTRAOP: CPT

## 2019-07-18 PROCEDURE — A4648 IMPLANTABLE TISSUE MARKER: HCPCS | Performed by: THORACIC SURGERY (CARDIOTHORACIC VASCULAR SURGERY)

## 2019-07-18 PROCEDURE — 25010000002 METOCLOPRAMIDE PER 10 MG: Performed by: THORACIC SURGERY (CARDIOTHORACIC VASCULAR SURGERY)

## 2019-07-18 PROCEDURE — 25010000003 CEFAZOLIN IN DEXTROSE 2-4 GM/100ML-% SOLUTION: Performed by: NURSE PRACTITIONER

## 2019-07-18 PROCEDURE — 25010000002 PROTAMINE SULFATE PER 10 MG: Performed by: ANESTHESIOLOGY

## 2019-07-18 PROCEDURE — 85025 COMPLETE CBC W/AUTO DIFF WBC: CPT | Performed by: THORACIC SURGERY (CARDIOTHORACIC VASCULAR SURGERY)

## 2019-07-18 PROCEDURE — 85610 PROTHROMBIN TIME: CPT | Performed by: NURSE PRACTITIONER

## 2019-07-18 PROCEDURE — 85025 COMPLETE CBC W/AUTO DIFF WBC: CPT | Performed by: NURSE PRACTITIONER

## 2019-07-18 PROCEDURE — 25010000002 HEPARIN (PORCINE) PER 1000 UNITS: Performed by: ANESTHESIOLOGY

## 2019-07-18 PROCEDURE — 82962 GLUCOSE BLOOD TEST: CPT

## 2019-07-18 DEVICE — SS SUTURE, 4 PER SLEEVE
Type: IMPLANTABLE DEVICE | Site: STERNUM | Status: FUNCTIONAL
Brand: MYO/WIRE II

## 2019-07-18 RX ORDER — PROPOFOL 10 MG/ML
VIAL (ML) INTRAVENOUS AS NEEDED
Status: DISCONTINUED | OUTPATIENT
Start: 2019-07-18 | End: 2019-07-18 | Stop reason: SURG

## 2019-07-18 RX ORDER — PROPOFOL 10 MG/ML
VIAL (ML) INTRAVENOUS CONTINUOUS PRN
Status: DISCONTINUED | OUTPATIENT
Start: 2019-07-18 | End: 2019-07-18 | Stop reason: SURG

## 2019-07-18 RX ORDER — SODIUM CHLORIDE 9 MG/ML
INJECTION, SOLUTION INTRAVENOUS CONTINUOUS PRN
Status: DISCONTINUED | OUTPATIENT
Start: 2019-07-18 | End: 2019-07-18 | Stop reason: SURG

## 2019-07-18 RX ORDER — CALCIUM CHLORIDE 100 MG/ML
1 INJECTION INTRAVENOUS; INTRAVENTRICULAR ONCE
Status: COMPLETED | OUTPATIENT
Start: 2019-07-19 | End: 2019-07-19

## 2019-07-18 RX ORDER — GLYCOPYRROLATE 0.2 MG/ML
INJECTION INTRAMUSCULAR; INTRAVENOUS AS NEEDED
Status: DISCONTINUED | OUTPATIENT
Start: 2019-07-18 | End: 2019-07-18 | Stop reason: SURG

## 2019-07-18 RX ORDER — MIDAZOLAM HYDROCHLORIDE 1 MG/ML
INJECTION INTRAMUSCULAR; INTRAVENOUS AS NEEDED
Status: DISCONTINUED | OUTPATIENT
Start: 2019-07-18 | End: 2019-07-18 | Stop reason: SURG

## 2019-07-18 RX ORDER — SENNA AND DOCUSATE SODIUM 50; 8.6 MG/1; MG/1
2 TABLET, FILM COATED ORAL NIGHTLY
Status: DISCONTINUED | OUTPATIENT
Start: 2019-07-19 | End: 2019-07-25 | Stop reason: HOSPADM

## 2019-07-18 RX ORDER — GABAPENTIN 300 MG/1
300 CAPSULE ORAL ONCE
Status: COMPLETED | OUTPATIENT
Start: 2019-07-18 | End: 2019-07-18

## 2019-07-18 RX ORDER — MIDAZOLAM HYDROCHLORIDE 1 MG/ML
2 INJECTION INTRAMUSCULAR; INTRAVENOUS
Status: DISCONTINUED | OUTPATIENT
Start: 2019-07-18 | End: 2019-07-19

## 2019-07-18 RX ORDER — ONDANSETRON 2 MG/ML
4 INJECTION INTRAMUSCULAR; INTRAVENOUS EVERY 6 HOURS PRN
Status: DISCONTINUED | OUTPATIENT
Start: 2019-07-18 | End: 2019-07-25 | Stop reason: HOSPADM

## 2019-07-18 RX ORDER — ATORVASTATIN CALCIUM 20 MG/1
40 TABLET, FILM COATED ORAL NIGHTLY
Status: DISCONTINUED | OUTPATIENT
Start: 2019-07-18 | End: 2019-07-25 | Stop reason: HOSPADM

## 2019-07-18 RX ORDER — ASPIRIN 325 MG
325 TABLET, DELAYED RELEASE (ENTERIC COATED) ORAL DAILY
Status: DISCONTINUED | OUTPATIENT
Start: 2019-07-19 | End: 2019-07-25 | Stop reason: HOSPADM

## 2019-07-18 RX ORDER — FENTANYL CITRATE 50 UG/ML
INJECTION, SOLUTION INTRAMUSCULAR; INTRAVENOUS AS NEEDED
Status: DISCONTINUED | OUTPATIENT
Start: 2019-07-18 | End: 2019-07-18 | Stop reason: SURG

## 2019-07-18 RX ORDER — POTASSIUM CHLORIDE 29.8 MG/ML
20 INJECTION INTRAVENOUS
Status: DISCONTINUED | OUTPATIENT
Start: 2019-07-18 | End: 2019-07-25 | Stop reason: HOSPADM

## 2019-07-18 RX ORDER — DOPAMINE HYDROCHLORIDE 160 MG/100ML
2-20 INJECTION, SOLUTION INTRAVENOUS CONTINUOUS PRN
Status: DISCONTINUED | OUTPATIENT
Start: 2019-07-18 | End: 2019-07-19

## 2019-07-18 RX ORDER — CHLORHEXIDINE GLUCONATE 0.12 MG/ML
15 RINSE ORAL EVERY 12 HOURS
Status: DISCONTINUED | OUTPATIENT
Start: 2019-07-18 | End: 2019-07-21

## 2019-07-18 RX ORDER — ALBUMIN, HUMAN INJ 5% 5 %
1500 SOLUTION INTRAVENOUS AS NEEDED
Status: DISPENSED | OUTPATIENT
Start: 2019-07-18 | End: 2019-07-19

## 2019-07-18 RX ORDER — PAPAVERINE HYDROCHLORIDE 30 MG/ML
INJECTION INTRAMUSCULAR; INTRAVENOUS AS NEEDED
Status: DISCONTINUED | OUTPATIENT
Start: 2019-07-18 | End: 2019-07-18 | Stop reason: HOSPADM

## 2019-07-18 RX ORDER — MORPHINE SULFATE 2 MG/ML
1 INJECTION, SOLUTION INTRAMUSCULAR; INTRAVENOUS EVERY 4 HOURS PRN
Status: DISCONTINUED | OUTPATIENT
Start: 2019-07-18 | End: 2019-07-23

## 2019-07-18 RX ORDER — AMINOCAPROIC ACID 250 MG/ML
INJECTION, SOLUTION INTRAVENOUS AS NEEDED
Status: DISCONTINUED | OUTPATIENT
Start: 2019-07-18 | End: 2019-07-18 | Stop reason: SURG

## 2019-07-18 RX ORDER — NALOXONE HCL 0.4 MG/ML
0.4 VIAL (ML) INJECTION
Status: DISCONTINUED | OUTPATIENT
Start: 2019-07-18 | End: 2019-07-25 | Stop reason: HOSPADM

## 2019-07-18 RX ORDER — ROCURONIUM BROMIDE 10 MG/ML
INJECTION, SOLUTION INTRAVENOUS AS NEEDED
Status: DISCONTINUED | OUTPATIENT
Start: 2019-07-18 | End: 2019-07-18 | Stop reason: SURG

## 2019-07-18 RX ORDER — FUROSEMIDE 10 MG/ML
40 INJECTION INTRAMUSCULAR; INTRAVENOUS EVERY 6 HOURS PRN
Status: DISCONTINUED | OUTPATIENT
Start: 2019-07-18 | End: 2019-07-23

## 2019-07-18 RX ORDER — SODIUM CHLORIDE 0.9 % (FLUSH) 0.9 %
30 SYRINGE (ML) INJECTION ONCE AS NEEDED
Status: DISCONTINUED | OUTPATIENT
Start: 2019-07-18 | End: 2019-07-25 | Stop reason: HOSPADM

## 2019-07-18 RX ORDER — KETAMINE HYDROCHLORIDE 10 MG/ML
INJECTION INTRAMUSCULAR; INTRAVENOUS AS NEEDED
Status: DISCONTINUED | OUTPATIENT
Start: 2019-07-18 | End: 2019-07-18 | Stop reason: SURG

## 2019-07-18 RX ORDER — MAGNESIUM HYDROXIDE 1200 MG/15ML
LIQUID ORAL AS NEEDED
Status: DISCONTINUED | OUTPATIENT
Start: 2019-07-18 | End: 2019-07-18 | Stop reason: HOSPADM

## 2019-07-18 RX ORDER — SODIUM CHLORIDE 9 MG/ML
30 INJECTION, SOLUTION INTRAVENOUS CONTINUOUS PRN
Status: DISCONTINUED | OUTPATIENT
Start: 2019-07-18 | End: 2019-07-23

## 2019-07-18 RX ORDER — PROTAMINE SULFATE 10 MG/ML
INJECTION, SOLUTION INTRAVENOUS AS NEEDED
Status: DISCONTINUED | OUTPATIENT
Start: 2019-07-18 | End: 2019-07-18 | Stop reason: SURG

## 2019-07-18 RX ORDER — MAGNESIUM SULFATE HEPTAHYDRATE 500 MG/ML
INJECTION, SOLUTION INTRAMUSCULAR; INTRAVENOUS AS NEEDED
Status: DISCONTINUED | OUTPATIENT
Start: 2019-07-18 | End: 2019-07-18 | Stop reason: SURG

## 2019-07-18 RX ORDER — ALPRAZOLAM 0.25 MG/1
0.25 TABLET ORAL EVERY 8 HOURS PRN
Status: DISCONTINUED | OUTPATIENT
Start: 2019-07-18 | End: 2019-07-23

## 2019-07-18 RX ORDER — POTASSIUM CHLORIDE 7.45 MG/ML
10 INJECTION INTRAVENOUS
Status: DISCONTINUED | OUTPATIENT
Start: 2019-07-18 | End: 2019-07-25 | Stop reason: HOSPADM

## 2019-07-18 RX ORDER — ACETAMINOPHEN 500 MG
1000 TABLET ORAL ONCE
Status: COMPLETED | OUTPATIENT
Start: 2019-07-18 | End: 2019-07-18

## 2019-07-18 RX ORDER — POTASSIUM CHLORIDE 29.8 MG/ML
20 INJECTION INTRAVENOUS
Status: COMPLETED | OUTPATIENT
Start: 2019-07-18 | End: 2019-07-18

## 2019-07-18 RX ORDER — MORPHINE SULFATE 2 MG/ML
4 INJECTION, SOLUTION INTRAMUSCULAR; INTRAVENOUS
Status: DISCONTINUED | OUTPATIENT
Start: 2019-07-18 | End: 2019-07-23

## 2019-07-18 RX ORDER — SODIUM CHLORIDE 9 MG/ML
30 INJECTION, SOLUTION INTRAVENOUS CONTINUOUS
Status: DISCONTINUED | OUTPATIENT
Start: 2019-07-18 | End: 2019-07-24

## 2019-07-18 RX ORDER — MEPERIDINE HYDROCHLORIDE 25 MG/ML
25 INJECTION INTRAMUSCULAR; INTRAVENOUS; SUBCUTANEOUS EVERY 4 HOURS PRN
Status: DISCONTINUED | OUTPATIENT
Start: 2019-07-18 | End: 2019-07-19

## 2019-07-18 RX ORDER — BISACODYL 10 MG
10 SUPPOSITORY, RECTAL RECTAL DAILY PRN
Status: DISCONTINUED | OUTPATIENT
Start: 2019-07-19 | End: 2019-07-25 | Stop reason: HOSPADM

## 2019-07-18 RX ORDER — POTASSIUM CHLORIDE 750 MG/1
40 CAPSULE, EXTENDED RELEASE ORAL AS NEEDED
Status: DISCONTINUED | OUTPATIENT
Start: 2019-07-18 | End: 2019-07-25 | Stop reason: HOSPADM

## 2019-07-18 RX ORDER — HYDROCODONE BITARTRATE AND ACETAMINOPHEN 5; 325 MG/1; MG/1
2 TABLET ORAL EVERY 4 HOURS PRN
Status: DISCONTINUED | OUTPATIENT
Start: 2019-07-18 | End: 2019-07-25 | Stop reason: HOSPADM

## 2019-07-18 RX ORDER — NOREPINEPHRINE BIT/0.9 % NACL 8 MG/250ML
.02-.3 INFUSION BOTTLE (ML) INTRAVENOUS CONTINUOUS PRN
Status: DISCONTINUED | OUTPATIENT
Start: 2019-07-18 | End: 2019-07-19

## 2019-07-18 RX ORDER — BISACODYL 5 MG/1
10 TABLET, DELAYED RELEASE ORAL DAILY PRN
Status: DISCONTINUED | OUTPATIENT
Start: 2019-07-18 | End: 2019-07-25 | Stop reason: HOSPADM

## 2019-07-18 RX ORDER — MAGNESIUM SULFATE 1 G/100ML
1 INJECTION INTRAVENOUS EVERY 8 HOURS
Status: DISPENSED | OUTPATIENT
Start: 2019-07-18 | End: 2019-07-19

## 2019-07-18 RX ORDER — FAMOTIDINE 10 MG/ML
20 INJECTION, SOLUTION INTRAVENOUS ONCE
Status: DISCONTINUED | OUTPATIENT
Start: 2019-07-18 | End: 2019-07-18 | Stop reason: HOSPADM

## 2019-07-18 RX ORDER — NITROGLYCERIN 20 MG/100ML
5-200 INJECTION INTRAVENOUS
Status: DISCONTINUED | OUTPATIENT
Start: 2019-07-18 | End: 2019-07-19

## 2019-07-18 RX ORDER — OXYCODONE HYDROCHLORIDE 5 MG/1
10 TABLET ORAL EVERY 4 HOURS PRN
Status: DISCONTINUED | OUTPATIENT
Start: 2019-07-18 | End: 2019-07-19

## 2019-07-18 RX ORDER — METOCLOPRAMIDE HYDROCHLORIDE 5 MG/ML
10 INJECTION INTRAMUSCULAR; INTRAVENOUS EVERY 6 HOURS
Status: ACTIVE | OUTPATIENT
Start: 2019-07-18 | End: 2019-07-19

## 2019-07-18 RX ORDER — CYCLOBENZAPRINE HCL 10 MG
10 TABLET ORAL EVERY 8 HOURS PRN
Status: DISCONTINUED | OUTPATIENT
Start: 2019-07-19 | End: 2019-07-19

## 2019-07-18 RX ORDER — HEPARIN SODIUM 1000 [USP'U]/ML
INJECTION, SOLUTION INTRAVENOUS; SUBCUTANEOUS AS NEEDED
Status: DISCONTINUED | OUTPATIENT
Start: 2019-07-18 | End: 2019-07-18 | Stop reason: SURG

## 2019-07-18 RX ORDER — PANTOPRAZOLE SODIUM 40 MG/1
40 TABLET, DELAYED RELEASE ORAL
Status: DISCONTINUED | OUTPATIENT
Start: 2019-07-19 | End: 2019-07-25 | Stop reason: HOSPADM

## 2019-07-18 RX ORDER — MILRINONE LACTATE 0.2 MG/ML
0.12 INJECTION, SOLUTION INTRAVENOUS CONTINUOUS PRN
Status: DISCONTINUED | OUTPATIENT
Start: 2019-07-18 | End: 2019-07-20

## 2019-07-18 RX ORDER — POTASSIUM CHLORIDE 1.5 G/1.77G
40 POWDER, FOR SOLUTION ORAL AS NEEDED
Status: DISCONTINUED | OUTPATIENT
Start: 2019-07-18 | End: 2019-07-25 | Stop reason: HOSPADM

## 2019-07-18 RX ORDER — CEFAZOLIN SODIUM 2 G/100ML
2 INJECTION, SOLUTION INTRAVENOUS EVERY 8 HOURS
Status: COMPLETED | OUTPATIENT
Start: 2019-07-18 | End: 2019-07-20

## 2019-07-18 RX ADMIN — SODIUM CHLORIDE: 0.9 INJECTION, SOLUTION INTRAVENOUS at 06:52

## 2019-07-18 RX ADMIN — CEFAZOLIN SODIUM 2 G: 2 INJECTION, SOLUTION INTRAVENOUS at 07:23

## 2019-07-18 RX ADMIN — ALBUMIN HUMAN 250 ML: 0.05 INJECTION, SOLUTION INTRAVENOUS at 15:03

## 2019-07-18 RX ADMIN — MUPIROCIN 1 APPLICATION: 20 OINTMENT TOPICAL at 06:22

## 2019-07-18 RX ADMIN — FENTANYL CITRATE 100 MCG: 50 INJECTION, SOLUTION INTRAMUSCULAR; INTRAVENOUS at 08:04

## 2019-07-18 RX ADMIN — METOCLOPRAMIDE 10 MG: 5 INJECTION, SOLUTION INTRAMUSCULAR; INTRAVENOUS at 18:14

## 2019-07-18 RX ADMIN — GLYCOPYRROLATE 0.6 MG: 0.2 INJECTION INTRAMUSCULAR; INTRAVENOUS at 12:04

## 2019-07-18 RX ADMIN — MORPHINE SULFATE 4 MG: 2 INJECTION, SOLUTION INTRAMUSCULAR; INTRAVENOUS at 20:17

## 2019-07-18 RX ADMIN — MUPIROCIN 1 APPLICATION: 20 OINTMENT TOPICAL at 21:44

## 2019-07-18 RX ADMIN — NICARDIPINE HYDROCHLORIDE 2 MG/HR: 2.5 INJECTION INTRAVENOUS at 12:30

## 2019-07-18 RX ADMIN — METOPROLOL TARTRATE 12.5 MG: 25 TABLET ORAL at 06:24

## 2019-07-18 RX ADMIN — KETAMINE HYDROCHLORIDE 10 MG: 10 INJECTION INTRAMUSCULAR; INTRAVENOUS at 10:22

## 2019-07-18 RX ADMIN — AMINOCAPROIC ACID 10 G: 250 INJECTION, SOLUTION INTRAVENOUS at 11:15

## 2019-07-18 RX ADMIN — Medication 100 MEQ: at 20:15

## 2019-07-18 RX ADMIN — MAGNESIUM SULFATE HEPTAHYDRATE 1 G: 1 INJECTION, SOLUTION INTRAVENOUS at 21:43

## 2019-07-18 RX ADMIN — SODIUM CHLORIDE 1 MCG/KG/HR: 900 INJECTION, SOLUTION INTRAVENOUS at 07:45

## 2019-07-18 RX ADMIN — FENTANYL CITRATE 100 MCG: 50 INJECTION, SOLUTION INTRAMUSCULAR; INTRAVENOUS at 07:47

## 2019-07-18 RX ADMIN — SODIUM BICARBONATE 100 MEQ: 84 INJECTION, SOLUTION INTRAVENOUS at 20:15

## 2019-07-18 RX ADMIN — AMIODARONE HYDROCHLORIDE 0.5 MG/MIN: 1.8 INJECTION, SOLUTION INTRAVENOUS at 22:34

## 2019-07-18 RX ADMIN — Medication 0.5 MG: at 06:58

## 2019-07-18 RX ADMIN — PROPOFOL 150 MG: 10 INJECTION, EMULSION INTRAVENOUS at 07:14

## 2019-07-18 RX ADMIN — CHLORHEXIDINE GLUCONATE 1 APPLICATION: 500 CLOTH TOPICAL at 06:25

## 2019-07-18 RX ADMIN — Medication 0.5 MG: at 07:23

## 2019-07-18 RX ADMIN — MEPERIDINE HYDROCHLORIDE 25 MG: 25 INJECTION INTRAMUSCULAR; INTRAVENOUS; SUBCUTANEOUS at 17:00

## 2019-07-18 RX ADMIN — AMINOCAPROIC ACID 10 G: 250 INJECTION, SOLUTION INTRAVENOUS at 08:10

## 2019-07-18 RX ADMIN — PHENYLEPHRINE HYDROCHLORIDE 0.5 MCG/KG/MIN: 10 INJECTION, SOLUTION INTRAMUSCULAR; INTRAVENOUS; SUBCUTANEOUS at 07:22

## 2019-07-18 RX ADMIN — FENTANYL CITRATE 100 MCG: 50 INJECTION, SOLUTION INTRAMUSCULAR; INTRAVENOUS at 10:29

## 2019-07-18 RX ADMIN — METOCLOPRAMIDE 10 MG: 5 INJECTION, SOLUTION INTRAMUSCULAR; INTRAVENOUS at 13:57

## 2019-07-18 RX ADMIN — Medication 0.2 MCG/KG/HR: at 16:40

## 2019-07-18 RX ADMIN — ACETAMINOPHEN 1000 MG: 500 TABLET, FILM COATED ORAL at 06:23

## 2019-07-18 RX ADMIN — CHLORHEXIDINE GLUCONATE 15 ML: 1.2 RINSE ORAL at 06:23

## 2019-07-18 RX ADMIN — SODIUM CHLORIDE: 0.9 INJECTION, SOLUTION INTRAVENOUS at 07:43

## 2019-07-18 RX ADMIN — CHLORHEXIDINE GLUCONATE 15 ML: 1.2 RINSE ORAL at 18:14

## 2019-07-18 RX ADMIN — PROPOFOL 50 MG: 10 INJECTION, EMULSION INTRAVENOUS at 07:18

## 2019-07-18 RX ADMIN — CEFAZOLIN SODIUM 2 G: 2 INJECTION, SOLUTION INTRAVENOUS at 11:17

## 2019-07-18 RX ADMIN — GABAPENTIN 300 MG: 300 CAPSULE ORAL at 06:23

## 2019-07-18 RX ADMIN — ALBUMIN HUMAN 250 ML: 0.05 INJECTION, SOLUTION INTRAVENOUS at 19:23

## 2019-07-18 RX ADMIN — ALBUMIN HUMAN 250 ML: 0.05 INJECTION, SOLUTION INTRAVENOUS at 23:35

## 2019-07-18 RX ADMIN — POTASSIUM CHLORIDE 20 MEQ: 29.8 INJECTION, SOLUTION INTRAVENOUS at 15:04

## 2019-07-18 RX ADMIN — ROCURONIUM BROMIDE 50 MG: 10 INJECTION INTRAVENOUS at 07:14

## 2019-07-18 RX ADMIN — HEPARIN SODIUM 27000 UNITS: 1000 INJECTION, SOLUTION INTRAVENOUS; SUBCUTANEOUS at 08:46

## 2019-07-18 RX ADMIN — MAGNESIUM SULFATE HEPTAHYDRATE 2 G: 500 INJECTION, SOLUTION INTRAMUSCULAR; INTRAVENOUS at 10:37

## 2019-07-18 RX ADMIN — POTASSIUM PHOSPHATE, MONOBASIC AND POTASSIUM PHOSPHATE, DIBASIC 30 MMOL: 224; 236 INJECTION, SOLUTION INTRAVENOUS at 17:00

## 2019-07-18 RX ADMIN — PROPOFOL 50 MCG/KG/MIN: 10 INJECTION, EMULSION INTRAVENOUS at 07:45

## 2019-07-18 RX ADMIN — NEOSTIGMINE METHYLSULFATE 3 MG: 5 INJECTION, SOLUTION INTRAMUSCULAR; INTRAVENOUS; SUBCUTANEOUS at 12:04

## 2019-07-18 RX ADMIN — FENTANYL CITRATE 50 MCG: 50 INJECTION, SOLUTION INTRAMUSCULAR; INTRAVENOUS at 07:09

## 2019-07-18 RX ADMIN — KETAMINE HYDROCHLORIDE 20 MG: 10 INJECTION INTRAMUSCULAR; INTRAVENOUS at 08:04

## 2019-07-18 RX ADMIN — PROTAMINE SULFATE 300 MG: 10 INJECTION, SOLUTION INTRAVENOUS at 10:57

## 2019-07-18 RX ADMIN — POTASSIUM CHLORIDE 20 MEQ: 29.8 INJECTION, SOLUTION INTRAVENOUS at 14:02

## 2019-07-18 RX ADMIN — FENTANYL CITRATE 50 MCG: 50 INJECTION, SOLUTION INTRAMUSCULAR; INTRAVENOUS at 10:41

## 2019-07-18 RX ADMIN — SODIUM CHLORIDE 0.25 MCG/KG/MIN: 0.9 INJECTION, SOLUTION INTRAVENOUS at 10:23

## 2019-07-18 RX ADMIN — ALBUMIN HUMAN 250 ML: 0.05 INJECTION, SOLUTION INTRAVENOUS at 13:59

## 2019-07-18 RX ADMIN — AMIODARONE HYDROCHLORIDE 0.5 MG/MIN: 1.8 INJECTION, SOLUTION INTRAVENOUS at 11:24

## 2019-07-18 RX ADMIN — MILRINONE LACTATE IN DEXTROSE 0.25 MCG/KG/MIN: 200 INJECTION, SOLUTION INTRAVENOUS at 22:34

## 2019-07-18 RX ADMIN — ROCURONIUM BROMIDE 20 MG: 10 INJECTION INTRAVENOUS at 10:48

## 2019-07-18 RX ADMIN — CEFAZOLIN SODIUM 2 G: 2 INJECTION, SOLUTION INTRAVENOUS at 18:16

## 2019-07-18 RX ADMIN — FENTANYL CITRATE 100 MCG: 50 INJECTION, SOLUTION INTRAMUSCULAR; INTRAVENOUS at 10:51

## 2019-07-18 RX ADMIN — KETAMINE HYDROCHLORIDE 10 MG: 10 INJECTION INTRAMUSCULAR; INTRAVENOUS at 10:51

## 2019-07-18 RX ADMIN — DEXTROSE MONOHYDRATE 25 G: 70 INJECTION, SOLUTION INTRAVENOUS at 05:47

## 2019-07-18 RX ADMIN — Medication 1 MG: at 06:56

## 2019-07-18 NOTE — ANESTHESIA PROCEDURE NOTES
Central Line      Patient reassessed immediately prior to procedure    Patient location during procedure: OR  Start time: 7/18/2019 7:24 AM  Stop Time:7/18/2019 7:32 AM  Indications: vascular access  Staff  Anesthesiologist: Amador Kumar MD  Preanesthetic Checklist  Completed: patient identified, site marked, surgical consent, pre-op evaluation, timeout performed, IV checked, risks and benefits discussed and monitors and equipment checked  Central Line Prep  Sterile Tech:cap, gloves, gown, mask and sterile barriers  Prep: chloraprep  Patient monitoring: continuous pulse oximetry, blood pressure monitoring and EKG  Central Line Procedure  Laterality:right  Location:internal jugular  Catheter Type:Cordis and double lumen  Catheter Size:9 Fr  Guidance:ultrasound guided  PROCEDURE NOTE/ULTRASOUND INTERPRETATION.  Using ultrasound guidance the potential vascular sites for insertion of the catheter were visualized to determine the patency of the vessel to be used for vascular access.  After selecting the appropriate site for insertion, the needle was visualized under ultrasound being inserted into the internal jugular vein, followed by ultrasound confirmation of wire and catheter placement. There were no abnormalities seen on ultrasound; an image was taken; and the patient tolerated the procedure with no complications. Images: still images obtained, printed/placed on chart  Assessment  Post procedure:biopatch applied, line sutured and occlusive dressing applied  Assessement:blood return through all ports, free fluid flow, placement verified by x-ray, Omari Test, no pneumothorax on x-ray and chest x-ray ordered  Complications:no  Patient Tolerance:patient tolerated the procedure well with no apparent complications

## 2019-07-18 NOTE — ANESTHESIA PROCEDURE NOTES
Arterial Line      Patient reassessed immediately prior to procedure    Patient location during procedure: OR  Start time: 7/18/2019 7:01 AM  Stop Time:7/18/2019 7:05 AM       Line placed for hemodynamic monitoring, ABGs/Labs/ISTAT and MD/Surgeon request.  Performed By   Anesthesiologist: Amador Kumar MD  Preanesthetic Checklist  Completed: patient identified, site marked, surgical consent, pre-op evaluation, timeout performed, IV checked, risks and benefits discussed and monitors and equipment checked  Arterial Line Prep   Sterile Tech: cap, gloves, mask and sterile barriers  Prep: ChloraPrep  Patient monitoring: blood pressure monitoring, continuous pulse oximetry and EKG  Arterial Line Procedure   Laterality:left  Location:  radial artery  Catheter size: 20 G   Guidance: ultrasound guided  PROCEDURE NOTE/ULTRASOUND INTERPRETATION.  Using ultrasound guidance the potential vascular sites for insertion of the catheter were visualized to determine the patency of the vessel to be used for vascular access.  After selecting the appropriate site for insertion, the needle was visualized under ultrasound being inserted into the radial artery, followed by ultrasound confirmation of wire and catheter placement. There were no abnormalities seen on ultrasound; an image was taken; and the patient tolerated the procedure with no complications.   Number of attempts: 2  Successful placement: yes  Post Assessment   Dressing Type: wrist guard applied, occlusive dressing applied and secured with tape.   Complications no  Circ/Move/Sens Assessment: normal and unchanged.   Patient Tolerance: patient tolerated the procedure well with no apparent complications

## 2019-07-18 NOTE — ANESTHESIA PROCEDURE NOTES
Airway  Urgency: elective    Date/Time: 7/18/2019 7:17 AM  End Time:7/18/2019 7:21 AM  Airway not difficult    General Information and Staff    Patient location during procedure: OR  Anesthesiologist: Amador Kumar MD    Indications and Patient Condition  Indications for airway management: airway protection    Preoxygenated: yes  Mask difficulty assessment: 1 - vent by mask    Final Airway Details  Final airway type: endotracheal airway      Successful airway: ETT  Cuffed: yes   Successful intubation technique: direct laryngoscopy  Facilitating devices/methods: Bougie  Endotracheal tube insertion site: oral  Blade: CMAC  Blade size: D  ETT size (mm): 7.5  Cormack-Lehane Classification: grade IIb - view of arytenoids or posterior of glottis only  Placement verified by: chest auscultation   Measured from: lips  ETT to lips (cm): 21  Number of attempts at approach: 3 or more    Additional Comments  First attempt grade 3 view unabl to visualize glottis, second attemp passed eschmann with grade 3 view  Unable to advance ETT over eschmann,  3rd attempt with D blade CMAC grade 2 B view ETT placed without comp.  Recommend using CMAC for future intubations

## 2019-07-18 NOTE — ANESTHESIA PROCEDURE NOTES
Central Line      Patient reassessed immediately prior to procedure    Patient location during procedure: OR  Start time: 7/18/2019 7:32 AM  Stop Time:7/18/2019 7:39 AM  Indications: central pressure monitoring  Staff  Anesthesiologist: Amador Kumar MD  Preanesthetic Checklist  Completed: patient identified, site marked, surgical consent, pre-op evaluation, timeout performed, IV checked, risks and benefits discussed and monitors and equipment checked  Central Line Prep  Sterile Tech:cap, gloves, gown, mask and sterile barriers  Prep: chloraprep  Patient monitoring: blood pressure monitoring, continuous pulse oximetry and EKG  Central Line Procedure  Laterality:right  Location:internal jugular  Catheter Type:Bend-Leif  Assessment  Post procedure:line sutured, biopatch applied and occlusive dressing applied  Assessement:chest x-ray ordered, no pneumothorax on x-ray and free fluid flow  Complications:no  Patient Tolerance:patient tolerated the procedure well with no apparent complications

## 2019-07-18 NOTE — ANESTHESIA POSTPROCEDURE EVALUATION
"Patient: Dilip Verma    Procedure Summary     Date:  07/18/19 Room / Location:  [facility] OR [address] [place] [address] MAIN OR    Anesthesia Start:  0649 Anesthesia Stop:  1223    Procedure:  INTRAOPERATIVE SHOAIB; STERNOTOMY CORONARY ARTERY BYPASS x 3  USING LEFT INTERNAL MAMMARY ARTERY GRAFT UTILIZING ENDOSCOPICALLY HARVESTED RIGHT GREATER SAPHENOUS VEIN AND PRP. (N/A Chest) Diagnosis:       Coronary artery disease involving native coronary artery of native heart without angina pectoris      (Coronary artery disease involving native coronary artery of native heart without angina pectoris [I25.10])    Surgeon:  Bill Devi MD Provider:  Amador Kumar MD    Anesthesia Type:  general ASA Status:  4          Anesthesia Type: general  Last vitals  BP   110/65 (07/18/19 1603)   Temp   36.9 °C (98.4 °F) (07/17/19 2309)   Pulse   89 (07/18/19 1603)   Resp   14 (07/18/19 1218)     SpO2   95 % (07/18/19 1603)     Post Anesthesia Care and Evaluation    Patient location during evaluation: bedside  Patient participation: complete - patient cannot participate  Level of consciousness: obtunded/minimal responses and responsive to physical stimuli  Pain management: adequate  Airway patency: patent  Anesthetic complications: No anesthetic complications  PONV Status: none  Cardiovascular status: acceptable  Respiratory status: acceptable  Hydration status: acceptable    Comments: /65   Pulse 89   Temp 36.9 °C (98.4 °F) (Oral)   Resp 14   Ht 172.7 cm (68\")   Wt 88 kg (194 lb)   SpO2 95%   BMI 29.50 kg/m²         "

## 2019-07-18 NOTE — ANESTHESIA PROCEDURE NOTES
Procedure Performed: Emergent/Open-Heart Anesthesia [facility]       Start Time:  7/18/2019 7:47 AM       End Time:   7/18/2019 8:02 AM    Preanesthesia Checklist:  Patient identified, IV assessed, risks and benefits discussed, monitors and equipment assessed, procedure being performed at surgeon's request and anesthesia consent obtained.    General Procedure Information  Diagnostic Indications for Echo:  assessment of ascending aorta, assessment of surgical repair and hemodynamic monitoring  Physician Requesting Echo: Bill Devi MD  CPT Code:  Asterosclerosis of aorta, mitral regurgitation nonrheumatic  Location performed:  OR  Intubated  Bite block placed  Heart visualized  Probe Insertion:  Easy  Probe Type:  Multiplane  Modalities:  Color flow mapping, pulse wave Doppler and continuous wave Doppler    Echocardiographic and Doppler Measurements    Ventricles    Right Ventricle:  Cavity size normal.  Thrombus not present.  Global function mildly impaired.    Left Ventricle:  Cavity size normal.  Hypertrophy present.  Thrombus not present.  Global Function moderately impaired.  Ejection Fraction 45%.          Valves    Aortic Valve:  Annulus normal.  Stenosis not present.  Regurgitation absent.  Leaflets normal.  Leaflet motions normal.      Mitral Valve:  Annulus normal.  Stenosis not present.  Regurgitation mild.  Leaflets normal.  Leaflet motions normal.      Tricuspid Valve:  Annulus normal.  Stenosis not present.  Regurgitation absent.  Leaflets normal.          Aorta    Ascending Aorta:  Size normal.  Diameter 3.4 cm.  Dissection not present.  Plaque thickness less than 3 mm.  Mobile plaque not present.    Aortic Arch:  Size normal.  Diameter 2.7 cm.  Dissection not present.  Plaque thickness less than 3 mm.  Mobile plaque not present.    Descending Aorta:  Size normal.  Diameter 2.2 cm.  Dissection not present.  Plaque thickness less than 3 mm.  Mobile plaque not present.          Atria    Right  Atrium:  Size normal.  Spontaneous echo contrast not present.  Thrombus not present.  Tumor not present.  Device not present.      Left Atrium:  Size normal.  Spontaneous echo contrast not present.  Thrombus not present.  Tumor not present.  Device not present.    Left atrial appendage normal.          Diastolic Function Measurements:  Diastolic Dysfunction Grade= II  E= ms  A= ms  E/A Ratio=   DT= ms  S/D=  IVRT=    Other Findings  Pericardium:  normal  Pulmonary Venous Flow:  blunted (decreased) systolic flow    Anesthesia Information  Performed Personally  Anesthesiologist:  Amador Kumar MD      Echocardiogram Comments:       Postbypass results:  LVEF improved to 60%  Normal RHF   Mild MR no MS   No  or AS   No TR or TS

## 2019-07-19 ENCOUNTER — APPOINTMENT (OUTPATIENT)
Dept: GENERAL RADIOLOGY | Facility: HOSPITAL | Age: 69
End: 2019-07-19

## 2019-07-19 LAB
ALBUMIN SERPL-MCNC: 4.2 G/DL (ref 3.5–5.2)
ALBUMIN SERPL-MCNC: 5.1 G/DL (ref 3.5–5.2)
ANION GAP SERPL CALCULATED.3IONS-SCNC: 15.4 MMOL/L (ref 5–15)
ANION GAP SERPL CALCULATED.3IONS-SCNC: 19 MMOL/L (ref 5–15)
ARTERIAL PATENCY WRIST A: ABNORMAL
ARTERIAL PATENCY WRIST A: NORMAL
ATMOSPHERIC PRESS: 748.1 MMHG
ATMOSPHERIC PRESS: 748.4 MMHG
ATMOSPHERIC PRESS: 748.6 MMHG
ATMOSPHERIC PRESS: 749.2 MMHG
BASE EXCESS BLDA CALC-SCNC: 0 MMOL/L (ref 0–2)
BASE EXCESS BLDA CALC-SCNC: 0.1 MMOL/L (ref 0–2)
BASE EXCESS BLDA CALC-SCNC: 0.2 MMOL/L (ref 0–2)
BASE EXCESS BLDA CALC-SCNC: 0.9 MMOL/L (ref 0–2)
BASOPHILS # BLD AUTO: 0.01 10*3/MM3 (ref 0–0.2)
BASOPHILS NFR BLD AUTO: 0.1 % (ref 0–1.5)
BDY SITE: ABNORMAL
BDY SITE: NORMAL
BUN BLD-MCNC: 20 MG/DL (ref 8–23)
BUN BLD-MCNC: 26 MG/DL (ref 8–23)
BUN/CREAT SERPL: 14.1 (ref 7–25)
BUN/CREAT SERPL: 15 (ref 7–25)
CALCIUM SPEC-SCNC: 8 MG/DL (ref 8.6–10.5)
CALCIUM SPEC-SCNC: 8.4 MG/DL (ref 8.6–10.5)
CHLORIDE SERPL-SCNC: 100 MMOL/L (ref 98–107)
CHLORIDE SERPL-SCNC: 109 MMOL/L (ref 98–107)
CO2 SERPL-SCNC: 22.6 MMOL/L (ref 22–29)
CO2 SERPL-SCNC: 23 MMOL/L (ref 22–29)
CREAT BLD-MCNC: 1.33 MG/DL (ref 0.76–1.27)
CREAT BLD-MCNC: 1.85 MG/DL (ref 0.76–1.27)
DEPRECATED RDW RBC AUTO: 52.6 FL (ref 37–54)
EOSINOPHIL # BLD AUTO: 0 10*3/MM3 (ref 0–0.4)
EOSINOPHIL NFR BLD AUTO: 0 % (ref 0.3–6.2)
ERYTHROCYTE [DISTWIDTH] IN BLOOD BY AUTOMATED COUNT: 14.6 % (ref 12.3–15.4)
GFR SERPL CREATININE-BSD FRML MDRD: 36 ML/MIN/1.73
GFR SERPL CREATININE-BSD FRML MDRD: 53 ML/MIN/1.73
GLUCOSE BLD-MCNC: 150 MG/DL (ref 65–99)
GLUCOSE BLD-MCNC: 352 MG/DL (ref 65–99)
GLUCOSE BLDC GLUCOMTR-MCNC: 130 MG/DL (ref 70–130)
GLUCOSE BLDC GLUCOMTR-MCNC: 135 MG/DL (ref 70–130)
GLUCOSE BLDC GLUCOMTR-MCNC: 145 MG/DL (ref 70–130)
GLUCOSE BLDC GLUCOMTR-MCNC: 163 MG/DL (ref 70–130)
GLUCOSE BLDC GLUCOMTR-MCNC: 293 MG/DL (ref 70–130)
GLUCOSE BLDC GLUCOMTR-MCNC: 354 MG/DL (ref 70–130)
HCO3 BLDA-SCNC: 24.2 MMOL/L (ref 22–28)
HCO3 BLDA-SCNC: 24.4 MMOL/L (ref 22–28)
HCO3 BLDA-SCNC: 26.3 MMOL/L (ref 22–28)
HCO3 BLDA-SCNC: 28.2 MMOL/L (ref 22–28)
HCT VFR BLD AUTO: 29 % (ref 37.5–51)
HGB BLD-MCNC: 9.1 G/DL (ref 13–17.7)
HOROWITZ INDEX BLD+IHG-RTO: 40 %
INR PPP: 1.48 (ref 0.9–1.1)
LYMPHOCYTES # BLD AUTO: 0.31 10*3/MM3 (ref 0.7–3.1)
LYMPHOCYTES NFR BLD AUTO: 2.9 % (ref 19.6–45.3)
MAGNESIUM SERPL-MCNC: 2.4 MG/DL (ref 1.6–2.4)
MAGNESIUM SERPL-MCNC: 2.9 MG/DL (ref 1.6–2.4)
MCH RBC QN AUTO: 30.4 PG (ref 26.6–33)
MCHC RBC AUTO-ENTMCNC: 31.4 G/DL (ref 31.5–35.7)
MCV RBC AUTO: 97 FL (ref 79–97)
METANEPHRINE, PL: 46 PG/ML (ref 0–62)
MODALITY: ABNORMAL
MODALITY: NORMAL
MONOCYTES # BLD AUTO: 0.64 10*3/MM3 (ref 0.1–0.9)
MONOCYTES NFR BLD AUTO: 6 % (ref 5–12)
NEUTROPHILS # BLD AUTO: 9.74 10*3/MM3 (ref 1.7–7)
NEUTROPHILS NFR BLD AUTO: 90.6 % (ref 42.7–76)
NORMETANEPHRINE, PL: 72 PG/ML (ref 0–145)
O2 A-A PPRESDIFF RESPIRATORY: 0.3 MMHG
PCO2 BLDA: 36.4 MM HG (ref 35–45)
PCO2 BLDA: 36.8 MM HG (ref 35–45)
PCO2 BLDA: 49.8 MM HG (ref 35–45)
PCO2 BLDA: 58.2 MM HG (ref 35–45)
PEEP RESPIRATORY: 5 CM[H2O]
PH BLDA: 7.29 PH UNITS (ref 7.35–7.45)
PH BLDA: 7.33 PH UNITS (ref 7.35–7.45)
PH BLDA: 7.43 PH UNITS (ref 7.35–7.45)
PH BLDA: 7.43 PH UNITS (ref 7.35–7.45)
PHOSPHATE SERPL-MCNC: 3.9 MG/DL (ref 2.5–4.5)
PHOSPHATE SERPL-MCNC: 5.4 MG/DL (ref 2.5–4.5)
PLATELET # BLD AUTO: 107 10*3/MM3 (ref 140–450)
PMV BLD AUTO: 11.9 FL (ref 6–12)
PO2 BLDA: 71.7 MM HG (ref 80–100)
PO2 BLDA: 75.8 MM HG (ref 80–100)
PO2 BLDA: 76 MM HG (ref 80–100)
PO2 BLDA: 83.1 MM HG (ref 80–100)
POTASSIUM BLD-SCNC: 4.9 MMOL/L (ref 3.5–5.2)
POTASSIUM BLD-SCNC: 4.9 MMOL/L (ref 3.5–5.2)
PROTHROMBIN TIME: 17.6 SECONDS (ref 11.7–14.2)
PSV: 8 CMH2O
RBC # BLD AUTO: 2.99 10*6/MM3 (ref 4.14–5.8)
SAO2 % BLDCOA: 91.8 % (ref 92–99)
SAO2 % BLDCOA: 93.8 % (ref 92–99)
SAO2 % BLDCOA: 95.5 % (ref 92–99)
SAO2 % BLDCOA: 96.5 % (ref 92–99)
SET MECH RESP RATE: 12
SET MECH RESP RATE: 14
SET MECH RESP RATE: 14
SET MECH RESP RATE: 15
SODIUM BLD-SCNC: 142 MMOL/L (ref 136–145)
SODIUM BLD-SCNC: 147 MMOL/L (ref 136–145)
TOTAL RATE: 12 BREATHS/MINUTE
TOTAL RATE: 14 BREATHS/MINUTE
TOTAL RATE: 14 BREATHS/MINUTE
TOTAL RATE: 15 BREATHS/MINUTE
VENTILATOR MODE: ABNORMAL
VENTILATOR MODE: ABNORMAL
VENTILATOR MODE: AC
VENTILATOR MODE: AC
VT ON VENT VENT: 486 ML
VT ON VENT VENT: 650 ML
VT ON VENT VENT: 650 ML
VT ON VENT VENT: 688 ML
WBC NRBC COR # BLD: 10.74 10*3/MM3 (ref 3.4–10.8)

## 2019-07-19 PROCEDURE — 80069 RENAL FUNCTION PANEL: CPT | Performed by: THORACIC SURGERY (CARDIOTHORACIC VASCULAR SURGERY)

## 2019-07-19 PROCEDURE — 93005 ELECTROCARDIOGRAM TRACING: CPT | Performed by: THORACIC SURGERY (CARDIOTHORACIC VASCULAR SURGERY)

## 2019-07-19 PROCEDURE — 25010000002 ENOXAPARIN PER 10 MG: Performed by: THORACIC SURGERY (CARDIOTHORACIC VASCULAR SURGERY)

## 2019-07-19 PROCEDURE — 25010000002 ALBUMIN HUMAN 25% PER 50 ML: Performed by: INTERNAL MEDICINE

## 2019-07-19 PROCEDURE — 94799 UNLISTED PULMONARY SVC/PX: CPT

## 2019-07-19 PROCEDURE — 93010 ELECTROCARDIOGRAM REPORT: CPT | Performed by: INTERNAL MEDICINE

## 2019-07-19 PROCEDURE — P9047 ALBUMIN (HUMAN), 25%, 50ML: HCPCS | Performed by: INTERNAL MEDICINE

## 2019-07-19 PROCEDURE — 82962 GLUCOSE BLOOD TEST: CPT

## 2019-07-19 PROCEDURE — 25010000003 CEFAZOLIN IN DEXTROSE 2-4 GM/100ML-% SOLUTION: Performed by: THORACIC SURGERY (CARDIOTHORACIC VASCULAR SURGERY)

## 2019-07-19 PROCEDURE — 83735 ASSAY OF MAGNESIUM: CPT | Performed by: THORACIC SURGERY (CARDIOTHORACIC VASCULAR SURGERY)

## 2019-07-19 PROCEDURE — 83735 ASSAY OF MAGNESIUM: CPT | Performed by: INTERNAL MEDICINE

## 2019-07-19 PROCEDURE — 85610 PROTHROMBIN TIME: CPT | Performed by: THORACIC SURGERY (CARDIOTHORACIC VASCULAR SURGERY)

## 2019-07-19 PROCEDURE — 63710000001 INSULIN LISPRO (HUMAN) PER 5 UNITS: Performed by: NURSE PRACTITIONER

## 2019-07-19 PROCEDURE — P9041 ALBUMIN (HUMAN),5%, 50ML: HCPCS | Performed by: THORACIC SURGERY (CARDIOTHORACIC VASCULAR SURGERY)

## 2019-07-19 PROCEDURE — 85025 COMPLETE CBC W/AUTO DIFF WBC: CPT | Performed by: THORACIC SURGERY (CARDIOTHORACIC VASCULAR SURGERY)

## 2019-07-19 PROCEDURE — 82803 BLOOD GASES ANY COMBINATION: CPT

## 2019-07-19 PROCEDURE — 99024 POSTOP FOLLOW-UP VISIT: CPT | Performed by: NURSE PRACTITIONER

## 2019-07-19 PROCEDURE — 71045 X-RAY EXAM CHEST 1 VIEW: CPT

## 2019-07-19 PROCEDURE — 25010000002 ALBUMIN HUMAN 5% PER 50 ML: Performed by: THORACIC SURGERY (CARDIOTHORACIC VASCULAR SURGERY)

## 2019-07-19 PROCEDURE — 25010000002 MAGNESIUM SULFATE IN D5W 1G/100ML (PREMIX) 1-5 GM/100ML-% SOLUTION: Performed by: THORACIC SURGERY (CARDIOTHORACIC VASCULAR SURGERY)

## 2019-07-19 PROCEDURE — 25010000002 MORPHINE PER 10 MG: Performed by: THORACIC SURGERY (CARDIOTHORACIC VASCULAR SURGERY)

## 2019-07-19 PROCEDURE — 97164 PT RE-EVAL EST PLAN CARE: CPT

## 2019-07-19 PROCEDURE — 97162 PT EVAL MOD COMPLEX 30 MIN: CPT

## 2019-07-19 PROCEDURE — 99233 SBSQ HOSP IP/OBS HIGH 50: CPT | Performed by: INTERNAL MEDICINE

## 2019-07-19 PROCEDURE — 80069 RENAL FUNCTION PANEL: CPT | Performed by: INTERNAL MEDICINE

## 2019-07-19 RX ORDER — BUMETANIDE 0.25 MG/ML
2 INJECTION INTRAMUSCULAR; INTRAVENOUS ONCE
Status: COMPLETED | OUTPATIENT
Start: 2019-07-19 | End: 2019-07-19

## 2019-07-19 RX ORDER — NICOTINE POLACRILEX 4 MG
15 LOZENGE BUCCAL
Status: DISCONTINUED | OUTPATIENT
Start: 2019-07-19 | End: 2019-07-25 | Stop reason: HOSPADM

## 2019-07-19 RX ORDER — ALBUMIN, HUMAN INJ 5% 5 %
250 SOLUTION INTRAVENOUS ONCE
Status: COMPLETED | OUTPATIENT
Start: 2019-07-19 | End: 2019-07-19

## 2019-07-19 RX ORDER — BUMETANIDE 0.25 MG/ML
0.5 INJECTION INTRAMUSCULAR; INTRAVENOUS ONCE
Status: COMPLETED | OUTPATIENT
Start: 2019-07-19 | End: 2019-07-19

## 2019-07-19 RX ORDER — ALBUMIN (HUMAN) 12.5 G/50ML
25 SOLUTION INTRAVENOUS ONCE
Status: COMPLETED | OUTPATIENT
Start: 2019-07-19 | End: 2019-07-19

## 2019-07-19 RX ORDER — DEXTROSE MONOHYDRATE 25 G/50ML
25 INJECTION, SOLUTION INTRAVENOUS
Status: DISCONTINUED | OUTPATIENT
Start: 2019-07-19 | End: 2019-07-25 | Stop reason: HOSPADM

## 2019-07-19 RX ORDER — ALBUMIN, HUMAN INJ 5% 5 %
500 SOLUTION INTRAVENOUS ONCE
Status: COMPLETED | OUTPATIENT
Start: 2019-07-19 | End: 2019-07-19

## 2019-07-19 RX ORDER — METOLAZONE 5 MG/1
5 TABLET ORAL ONCE
Status: COMPLETED | OUTPATIENT
Start: 2019-07-19 | End: 2019-07-19

## 2019-07-19 RX ORDER — AMIODARONE HYDROCHLORIDE 200 MG/1
200 TABLET ORAL 2 TIMES DAILY WITH MEALS
Status: DISCONTINUED | OUTPATIENT
Start: 2019-07-19 | End: 2019-07-25

## 2019-07-19 RX ADMIN — INSULIN LISPRO 8 UNITS: 100 INJECTION, SOLUTION INTRAVENOUS; SUBCUTANEOUS at 20:31

## 2019-07-19 RX ADMIN — ATORVASTATIN CALCIUM 40 MG: 20 TABLET, FILM COATED ORAL at 20:21

## 2019-07-19 RX ADMIN — HYDROCODONE BITARTRATE AND ACETAMINOPHEN 1 TABLET: 5; 325 TABLET ORAL at 19:45

## 2019-07-19 RX ADMIN — AMIODARONE HYDROCHLORIDE 200 MG: 200 TABLET ORAL at 19:45

## 2019-07-19 RX ADMIN — ALBUMIN HUMAN 500 ML: 0.05 INJECTION, SOLUTION INTRAVENOUS at 14:18

## 2019-07-19 RX ADMIN — ASPIRIN 325 MG: 325 TABLET, COATED ORAL at 09:06

## 2019-07-19 RX ADMIN — AMIODARONE HYDROCHLORIDE 200 MG: 200 TABLET ORAL at 09:05

## 2019-07-19 RX ADMIN — CHLORHEXIDINE GLUCONATE 15 ML: 1.2 RINSE ORAL at 19:45

## 2019-07-19 RX ADMIN — ENOXAPARIN SODIUM 40 MG: 40 INJECTION SUBCUTANEOUS at 19:44

## 2019-07-19 RX ADMIN — BUMETANIDE 0.5 MG: 0.25 INJECTION INTRAMUSCULAR; INTRAVENOUS at 07:15

## 2019-07-19 RX ADMIN — ALBUMIN HUMAN 25 G: 0.25 SOLUTION INTRAVENOUS at 09:04

## 2019-07-19 RX ADMIN — METOLAZONE 5 MG: 5 TABLET ORAL at 12:49

## 2019-07-19 RX ADMIN — MAGNESIUM SULFATE HEPTAHYDRATE 1 G: 1 INJECTION, SOLUTION INTRAVENOUS at 05:25

## 2019-07-19 RX ADMIN — CEFAZOLIN SODIUM 2 G: 2 INJECTION, SOLUTION INTRAVENOUS at 03:46

## 2019-07-19 RX ADMIN — CALCIUM CHLORIDE 1 G: 100 INJECTION INTRAVENOUS; INTRAVENTRICULAR at 00:10

## 2019-07-19 RX ADMIN — ALBUMIN HUMAN 250 ML: 0.05 INJECTION, SOLUTION INTRAVENOUS at 21:58

## 2019-07-19 RX ADMIN — MUPIROCIN 1 APPLICATION: 20 OINTMENT TOPICAL at 09:05

## 2019-07-19 RX ADMIN — ALBUMIN HUMAN 25 G: 0.25 SOLUTION INTRAVENOUS at 12:30

## 2019-07-19 RX ADMIN — CHLORHEXIDINE GLUCONATE 15 ML: 1.2 RINSE ORAL at 05:19

## 2019-07-19 RX ADMIN — ALBUMIN HUMAN 250 ML: 0.05 INJECTION, SOLUTION INTRAVENOUS at 02:23

## 2019-07-19 RX ADMIN — CEFAZOLIN SODIUM 2 G: 2 INJECTION, SOLUTION INTRAVENOUS at 11:19

## 2019-07-19 RX ADMIN — ALBUMIN HUMAN 250 ML: 0.05 INJECTION, SOLUTION INTRAVENOUS at 04:41

## 2019-07-19 RX ADMIN — BUMETANIDE 2 MG: 0.25 INJECTION INTRAMUSCULAR; INTRAVENOUS at 09:36

## 2019-07-19 RX ADMIN — MUPIROCIN: 20 OINTMENT TOPICAL at 20:23

## 2019-07-19 RX ADMIN — MORPHINE SULFATE 4 MG: 2 INJECTION, SOLUTION INTRAMUSCULAR; INTRAVENOUS at 01:15

## 2019-07-19 RX ADMIN — CEFAZOLIN SODIUM 2 G: 2 INJECTION, SOLUTION INTRAVENOUS at 19:46

## 2019-07-19 RX ADMIN — PANTOPRAZOLE SODIUM 40 MG: 40 TABLET, DELAYED RELEASE ORAL at 07:28

## 2019-07-19 RX ADMIN — SENNOSIDES AND DOCUSATE SODIUM 2 TABLET: 8.6; 5 TABLET ORAL at 20:21

## 2019-07-19 RX ADMIN — MILRINONE LACTATE IN DEXTROSE 0.12 MCG/KG/MIN: 200 INJECTION, SOLUTION INTRAVENOUS at 18:31

## 2019-07-20 ENCOUNTER — APPOINTMENT (OUTPATIENT)
Dept: GENERAL RADIOLOGY | Facility: HOSPITAL | Age: 69
End: 2019-07-20

## 2019-07-20 PROBLEM — I16.0 HYPERTENSIVE URGENCY: Status: ACTIVE | Noted: 2019-07-20

## 2019-07-20 PROBLEM — I25.10 CAD (CORONARY ARTERY DISEASE): Status: ACTIVE | Noted: 2019-07-20

## 2019-07-20 PROBLEM — I50.31 ACUTE DIASTOLIC (CONGESTIVE) HEART FAILURE: Status: ACTIVE | Noted: 2019-07-20

## 2019-07-20 PROBLEM — I95.1 ORTHOSTASIS: Status: ACTIVE | Noted: 2019-07-20

## 2019-07-20 LAB
ALDOST SERPL-MCNC: 2 NG/DL (ref 0–30)
ANION GAP SERPL CALCULATED.3IONS-SCNC: 14 MMOL/L (ref 5–15)
ANION GAP SERPL CALCULATED.3IONS-SCNC: 14.2 MMOL/L (ref 5–15)
ARTERIAL PATENCY WRIST A: ABNORMAL
ATMOSPHERIC PRESS: 749.9 MMHG
BASE EXCESS BLDA CALC-SCNC: -0.1 MMOL/L (ref 0–2)
BDY SITE: ABNORMAL
BUN BLD-MCNC: 31 MG/DL (ref 8–23)
BUN BLD-MCNC: 35 MG/DL (ref 8–23)
BUN/CREAT SERPL: 14.8 (ref 7–25)
BUN/CREAT SERPL: 16.8 (ref 7–25)
CALCIUM SPEC-SCNC: 7.8 MG/DL (ref 8.6–10.5)
CALCIUM SPEC-SCNC: 8 MG/DL (ref 8.6–10.5)
CHLORIDE SERPL-SCNC: 101 MMOL/L (ref 98–107)
CHLORIDE SERPL-SCNC: 99 MMOL/L (ref 98–107)
CO2 SERPL-SCNC: 25.8 MMOL/L (ref 22–29)
CO2 SERPL-SCNC: 26 MMOL/L (ref 22–29)
CREAT BLD-MCNC: 2.08 MG/DL (ref 0.76–1.27)
CREAT BLD-MCNC: 2.1 MG/DL (ref 0.76–1.27)
DEPRECATED RDW RBC AUTO: 53.9 FL (ref 37–54)
ERYTHROCYTE [DISTWIDTH] IN BLOOD BY AUTOMATED COUNT: 14.8 % (ref 12.3–15.4)
GAS FLOW AIRWAY: 4 LPM
GFR SERPL CREATININE-BSD FRML MDRD: 31 ML/MIN/1.73
GFR SERPL CREATININE-BSD FRML MDRD: 32 ML/MIN/1.73
GLUCOSE BLD-MCNC: 102 MG/DL (ref 65–99)
GLUCOSE BLD-MCNC: 114 MG/DL (ref 65–99)
GLUCOSE BLDC GLUCOMTR-MCNC: 112 MG/DL (ref 70–130)
GLUCOSE BLDC GLUCOMTR-MCNC: 123 MG/DL (ref 70–130)
GLUCOSE BLDC GLUCOMTR-MCNC: 126 MG/DL (ref 70–130)
GLUCOSE BLDC GLUCOMTR-MCNC: 91 MG/DL (ref 70–130)
GLUCOSE BLDC GLUCOMTR-MCNC: 99 MG/DL (ref 70–130)
HCO3 BLDA-SCNC: 25.4 MMOL/L (ref 22–28)
HCT VFR BLD AUTO: 27.2 % (ref 37.5–51)
HGB BLD-MCNC: 8.5 G/DL (ref 13–17.7)
MCH RBC QN AUTO: 31.3 PG (ref 26.6–33)
MCHC RBC AUTO-ENTMCNC: 31.3 G/DL (ref 31.5–35.7)
MCV RBC AUTO: 100 FL (ref 79–97)
MODALITY: ABNORMAL
PCO2 BLDA: 43.9 MM HG (ref 35–45)
PH BLDA: 7.37 PH UNITS (ref 7.35–7.45)
PLATELET # BLD AUTO: 116 10*3/MM3 (ref 140–450)
PMV BLD AUTO: 12.2 FL (ref 6–12)
PO2 BLDA: 68.5 MM HG (ref 80–100)
POTASSIUM BLD-SCNC: 4.3 MMOL/L (ref 3.5–5.2)
POTASSIUM BLD-SCNC: 4.3 MMOL/L (ref 3.5–5.2)
RBC # BLD AUTO: 2.72 10*6/MM3 (ref 4.14–5.8)
RENIN PLAS-CCNC: <0.167 NG/ML/HR (ref 0.17–5.38)
SAO2 % BLDCOA: 92.8 % (ref 92–99)
SET MECH RESP RATE: 20
SODIUM BLD-SCNC: 139 MMOL/L (ref 136–145)
SODIUM BLD-SCNC: 141 MMOL/L (ref 136–145)
WBC NRBC COR # BLD: 13.34 10*3/MM3 (ref 3.4–10.8)

## 2019-07-20 PROCEDURE — 25010000002 ENOXAPARIN PER 10 MG: Performed by: THORACIC SURGERY (CARDIOTHORACIC VASCULAR SURGERY)

## 2019-07-20 PROCEDURE — 93010 ELECTROCARDIOGRAM REPORT: CPT | Performed by: INTERNAL MEDICINE

## 2019-07-20 PROCEDURE — 94799 UNLISTED PULMONARY SVC/PX: CPT

## 2019-07-20 PROCEDURE — 80048 BASIC METABOLIC PNL TOTAL CA: CPT | Performed by: INTERNAL MEDICINE

## 2019-07-20 PROCEDURE — 93005 ELECTROCARDIOGRAM TRACING: CPT | Performed by: THORACIC SURGERY (CARDIOTHORACIC VASCULAR SURGERY)

## 2019-07-20 PROCEDURE — 85027 COMPLETE CBC AUTOMATED: CPT | Performed by: THORACIC SURGERY (CARDIOTHORACIC VASCULAR SURGERY)

## 2019-07-20 PROCEDURE — 25010000002 AMIODARONE IN DEXTROSE 5% 150-4.21 MG/100ML-% SOLUTION

## 2019-07-20 PROCEDURE — 36600 WITHDRAWAL OF ARTERIAL BLOOD: CPT

## 2019-07-20 PROCEDURE — 80048 BASIC METABOLIC PNL TOTAL CA: CPT | Performed by: THORACIC SURGERY (CARDIOTHORACIC VASCULAR SURGERY)

## 2019-07-20 PROCEDURE — 97110 THERAPEUTIC EXERCISES: CPT

## 2019-07-20 PROCEDURE — 99232 SBSQ HOSP IP/OBS MODERATE 35: CPT | Performed by: INTERNAL MEDICINE

## 2019-07-20 PROCEDURE — 99024 POSTOP FOLLOW-UP VISIT: CPT | Performed by: NURSE PRACTITIONER

## 2019-07-20 PROCEDURE — 97116 GAIT TRAINING THERAPY: CPT

## 2019-07-20 PROCEDURE — 82962 GLUCOSE BLOOD TEST: CPT

## 2019-07-20 PROCEDURE — 82803 BLOOD GASES ANY COMBINATION: CPT

## 2019-07-20 PROCEDURE — 71045 X-RAY EXAM CHEST 1 VIEW: CPT

## 2019-07-20 PROCEDURE — 25010000003 CEFAZOLIN IN DEXTROSE 2-4 GM/100ML-% SOLUTION: Performed by: THORACIC SURGERY (CARDIOTHORACIC VASCULAR SURGERY)

## 2019-07-20 RX ORDER — IPRATROPIUM BROMIDE AND ALBUTEROL SULFATE 2.5; .5 MG/3ML; MG/3ML
3 SOLUTION RESPIRATORY (INHALATION)
Status: DISCONTINUED | OUTPATIENT
Start: 2019-07-20 | End: 2019-07-25 | Stop reason: HOSPADM

## 2019-07-20 RX ORDER — TAMSULOSIN HYDROCHLORIDE 0.4 MG/1
0.4 CAPSULE ORAL DAILY
Status: DISCONTINUED | OUTPATIENT
Start: 2019-07-20 | End: 2019-07-25 | Stop reason: HOSPADM

## 2019-07-20 RX ORDER — GUAIFENESIN 600 MG/1
1200 TABLET, EXTENDED RELEASE ORAL EVERY 12 HOURS SCHEDULED
Status: DISCONTINUED | OUTPATIENT
Start: 2019-07-20 | End: 2019-07-25 | Stop reason: HOSPADM

## 2019-07-20 RX ORDER — PAROXETINE HYDROCHLORIDE HEMIHYDRATE 25 MG/1
25 TABLET, FILM COATED, EXTENDED RELEASE ORAL DAILY
Status: DISCONTINUED | OUTPATIENT
Start: 2019-07-20 | End: 2019-07-25 | Stop reason: HOSPADM

## 2019-07-20 RX ORDER — BUMETANIDE 0.25 MG/ML
1 INJECTION INTRAMUSCULAR; INTRAVENOUS ONCE
Status: COMPLETED | OUTPATIENT
Start: 2019-07-20 | End: 2019-07-20

## 2019-07-20 RX ADMIN — ENOXAPARIN SODIUM 40 MG: 40 INJECTION SUBCUTANEOUS at 17:33

## 2019-07-20 RX ADMIN — HYDROCODONE BITARTRATE AND ACETAMINOPHEN 1 TABLET: 5; 325 TABLET ORAL at 11:11

## 2019-07-20 RX ADMIN — CHLORHEXIDINE GLUCONATE 15 ML: 1.2 RINSE ORAL at 05:24

## 2019-07-20 RX ADMIN — ASPIRIN 325 MG: 325 TABLET, COATED ORAL at 08:24

## 2019-07-20 RX ADMIN — ATORVASTATIN CALCIUM 40 MG: 20 TABLET, FILM COATED ORAL at 23:11

## 2019-07-20 RX ADMIN — METOPROLOL TARTRATE 12.5 MG: 25 TABLET ORAL at 23:12

## 2019-07-20 RX ADMIN — AMIODARONE HYDROCHLORIDE 75 MG: 1.5 INJECTION, SOLUTION INTRAVENOUS at 17:46

## 2019-07-20 RX ADMIN — HYDROCODONE BITARTRATE AND ACETAMINOPHEN 1 TABLET: 5; 325 TABLET ORAL at 02:28

## 2019-07-20 RX ADMIN — SENNOSIDES AND DOCUSATE SODIUM 2 TABLET: 8.6; 5 TABLET ORAL at 23:11

## 2019-07-20 RX ADMIN — IPRATROPIUM BROMIDE AND ALBUTEROL SULFATE 3 ML: 2.5; .5 SOLUTION RESPIRATORY (INHALATION) at 20:37

## 2019-07-20 RX ADMIN — HYDROCODONE BITARTRATE AND ACETAMINOPHEN 1 TABLET: 5; 325 TABLET ORAL at 15:45

## 2019-07-20 RX ADMIN — MUPIROCIN 1 APPLICATION: 20 OINTMENT TOPICAL at 23:12

## 2019-07-20 RX ADMIN — PANTOPRAZOLE SODIUM 40 MG: 40 TABLET, DELAYED RELEASE ORAL at 05:24

## 2019-07-20 RX ADMIN — AMIODARONE HYDROCHLORIDE 200 MG: 200 TABLET ORAL at 17:45

## 2019-07-20 RX ADMIN — IPRATROPIUM BROMIDE AND ALBUTEROL SULFATE 3 ML: 2.5; .5 SOLUTION RESPIRATORY (INHALATION) at 15:04

## 2019-07-20 RX ADMIN — CHLORHEXIDINE GLUCONATE 15 ML: 1.2 RINSE ORAL at 17:33

## 2019-07-20 RX ADMIN — GUAIFENESIN 1200 MG: 600 TABLET, EXTENDED RELEASE ORAL at 23:12

## 2019-07-20 RX ADMIN — CEFAZOLIN SODIUM 2 G: 2 INJECTION, SOLUTION INTRAVENOUS at 02:28

## 2019-07-20 RX ADMIN — MUPIROCIN 1 APPLICATION: 20 OINTMENT TOPICAL at 08:24

## 2019-07-20 RX ADMIN — AMIODARONE HYDROCHLORIDE 200 MG: 200 TABLET ORAL at 08:24

## 2019-07-20 RX ADMIN — BUMETANIDE 1 MG: 0.25 INJECTION INTRAMUSCULAR; INTRAVENOUS at 12:44

## 2019-07-20 RX ADMIN — GUAIFENESIN 1200 MG: 600 TABLET, EXTENDED RELEASE ORAL at 11:15

## 2019-07-21 ENCOUNTER — APPOINTMENT (OUTPATIENT)
Dept: GENERAL RADIOLOGY | Facility: HOSPITAL | Age: 69
End: 2019-07-21

## 2019-07-21 LAB
ABO + RH BLD: NORMAL
ABO + RH BLD: NORMAL
ALBUMIN SERPL-MCNC: 4.3 G/DL (ref 3.5–5.2)
ALBUMIN/GLOB SERPL: 2 G/DL
ALP SERPL-CCNC: 49 U/L (ref 39–117)
ALT SERPL W P-5'-P-CCNC: 7 U/L (ref 1–41)
ANION GAP SERPL CALCULATED.3IONS-SCNC: 16.2 MMOL/L (ref 5–15)
AST SERPL-CCNC: 23 U/L (ref 1–40)
BH BB BLOOD EXPIRATION DATE: NORMAL
BH BB BLOOD EXPIRATION DATE: NORMAL
BH BB BLOOD TYPE BARCODE: 6200
BH BB BLOOD TYPE BARCODE: 6200
BH BB DISPENSE STATUS: NORMAL
BH BB DISPENSE STATUS: NORMAL
BH BB PRODUCT CODE: NORMAL
BH BB PRODUCT CODE: NORMAL
BH BB UNIT NUMBER: NORMAL
BH BB UNIT NUMBER: NORMAL
BILIRUB SERPL-MCNC: 0.9 MG/DL (ref 0.2–1.2)
BUN BLD-MCNC: 38 MG/DL (ref 8–23)
BUN/CREAT SERPL: 18.8 (ref 7–25)
CALCIUM SPEC-SCNC: 7.7 MG/DL (ref 8.6–10.5)
CHLORIDE SERPL-SCNC: 92 MMOL/L (ref 98–107)
CO2 SERPL-SCNC: 23.8 MMOL/L (ref 22–29)
CREAT BLD-MCNC: 2.02 MG/DL (ref 0.76–1.27)
DEPRECATED RDW RBC AUTO: 52 FL (ref 37–54)
ERYTHROCYTE [DISTWIDTH] IN BLOOD BY AUTOMATED COUNT: 14.4 % (ref 12.3–15.4)
GFR SERPL CREATININE-BSD FRML MDRD: 33 ML/MIN/1.73
GLOBULIN UR ELPH-MCNC: 2.1 GM/DL
GLUCOSE BLD-MCNC: 129 MG/DL (ref 65–99)
GLUCOSE BLDC GLUCOMTR-MCNC: 138 MG/DL (ref 70–130)
GLUCOSE BLDC GLUCOMTR-MCNC: 139 MG/DL (ref 70–130)
GLUCOSE BLDC GLUCOMTR-MCNC: 162 MG/DL (ref 70–130)
GLUCOSE BLDC GLUCOMTR-MCNC: 185 MG/DL (ref 70–130)
HCT VFR BLD AUTO: 31.4 % (ref 37.5–51)
HGB BLD-MCNC: 10 G/DL (ref 13–17.7)
MAGNESIUM SERPL-MCNC: 2.8 MG/DL (ref 1.6–2.4)
MCH RBC QN AUTO: 31.3 PG (ref 26.6–33)
MCHC RBC AUTO-ENTMCNC: 31.8 G/DL (ref 31.5–35.7)
MCV RBC AUTO: 98.4 FL (ref 79–97)
NT-PROBNP SERPL-MCNC: 4591 PG/ML (ref 5–900)
PHOSPHATE SERPL-MCNC: 4.5 MG/DL (ref 2.5–4.5)
PLATELET # BLD AUTO: 140 10*3/MM3 (ref 140–450)
PMV BLD AUTO: 12.2 FL (ref 6–12)
POTASSIUM BLD-SCNC: 4 MMOL/L (ref 3.5–5.2)
PROT SERPL-MCNC: 6.4 G/DL (ref 6–8.5)
RBC # BLD AUTO: 3.19 10*6/MM3 (ref 4.14–5.8)
SODIUM BLD-SCNC: 132 MMOL/L (ref 136–145)
UNIT  ABO: NORMAL
UNIT  ABO: NORMAL
UNIT  RH: NORMAL
UNIT  RH: NORMAL
WBC NRBC COR # BLD: 13.52 10*3/MM3 (ref 3.4–10.8)

## 2019-07-21 PROCEDURE — 82962 GLUCOSE BLOOD TEST: CPT

## 2019-07-21 PROCEDURE — 63710000001 INSULIN LISPRO (HUMAN) PER 5 UNITS: Performed by: NURSE PRACTITIONER

## 2019-07-21 PROCEDURE — 97110 THERAPEUTIC EXERCISES: CPT | Performed by: PHYSICAL THERAPIST

## 2019-07-21 PROCEDURE — 99231 SBSQ HOSP IP/OBS SF/LOW 25: CPT | Performed by: NURSE PRACTITIONER

## 2019-07-21 PROCEDURE — 94799 UNLISTED PULMONARY SVC/PX: CPT

## 2019-07-21 PROCEDURE — 25010000002 ENOXAPARIN PER 10 MG: Performed by: THORACIC SURGERY (CARDIOTHORACIC VASCULAR SURGERY)

## 2019-07-21 PROCEDURE — 84100 ASSAY OF PHOSPHORUS: CPT | Performed by: INTERNAL MEDICINE

## 2019-07-21 PROCEDURE — 25010000002 ONDANSETRON PER 1 MG: Performed by: THORACIC SURGERY (CARDIOTHORACIC VASCULAR SURGERY)

## 2019-07-21 PROCEDURE — 83735 ASSAY OF MAGNESIUM: CPT | Performed by: INTERNAL MEDICINE

## 2019-07-21 PROCEDURE — 71046 X-RAY EXAM CHEST 2 VIEWS: CPT

## 2019-07-21 PROCEDURE — 83880 ASSAY OF NATRIURETIC PEPTIDE: CPT | Performed by: INTERNAL MEDICINE

## 2019-07-21 PROCEDURE — 80053 COMPREHEN METABOLIC PANEL: CPT | Performed by: INTERNAL MEDICINE

## 2019-07-21 PROCEDURE — 85027 COMPLETE CBC AUTOMATED: CPT | Performed by: THORACIC SURGERY (CARDIOTHORACIC VASCULAR SURGERY)

## 2019-07-21 PROCEDURE — 99024 POSTOP FOLLOW-UP VISIT: CPT | Performed by: NURSE PRACTITIONER

## 2019-07-21 RX ORDER — BUMETANIDE 0.25 MG/ML
2 INJECTION INTRAMUSCULAR; INTRAVENOUS ONCE
Status: COMPLETED | OUTPATIENT
Start: 2019-07-21 | End: 2019-07-21

## 2019-07-21 RX ORDER — PRAMIPEXOLE DIHYDROCHLORIDE 0.25 MG/1
0.12 TABLET ORAL 2 TIMES DAILY
Status: DISCONTINUED | OUTPATIENT
Start: 2019-07-21 | End: 2019-07-25 | Stop reason: HOSPADM

## 2019-07-21 RX ADMIN — PANTOPRAZOLE SODIUM 40 MG: 40 TABLET, DELAYED RELEASE ORAL at 05:29

## 2019-07-21 RX ADMIN — MUPIROCIN 1 APPLICATION: 20 OINTMENT TOPICAL at 21:19

## 2019-07-21 RX ADMIN — METOPROLOL TARTRATE 12.5 MG: 25 TABLET ORAL at 21:19

## 2019-07-21 RX ADMIN — BUMETANIDE 2 MG: 0.25 INJECTION INTRAMUSCULAR; INTRAVENOUS at 08:06

## 2019-07-21 RX ADMIN — SENNOSIDES AND DOCUSATE SODIUM 2 TABLET: 8.6; 5 TABLET ORAL at 21:18

## 2019-07-21 RX ADMIN — GUAIFENESIN 1200 MG: 600 TABLET, EXTENDED RELEASE ORAL at 08:07

## 2019-07-21 RX ADMIN — PRAMIPEXOLE DIHYDROCHLORIDE 0.12 MG: 0.25 TABLET ORAL at 21:21

## 2019-07-21 RX ADMIN — HYDROCODONE BITARTRATE AND ACETAMINOPHEN 2 TABLET: 5; 325 TABLET ORAL at 06:20

## 2019-07-21 RX ADMIN — IPRATROPIUM BROMIDE AND ALBUTEROL SULFATE 3 ML: 2.5; .5 SOLUTION RESPIRATORY (INHALATION) at 20:08

## 2019-07-21 RX ADMIN — TAMSULOSIN HYDROCHLORIDE 0.4 MG: 0.4 CAPSULE ORAL at 08:07

## 2019-07-21 RX ADMIN — HYDROCODONE BITARTRATE AND ACETAMINOPHEN 2 TABLET: 5; 325 TABLET ORAL at 18:29

## 2019-07-21 RX ADMIN — ASPIRIN 325 MG: 325 TABLET, COATED ORAL at 08:07

## 2019-07-21 RX ADMIN — IPRATROPIUM BROMIDE AND ALBUTEROL SULFATE 3 ML: 2.5; .5 SOLUTION RESPIRATORY (INHALATION) at 11:13

## 2019-07-21 RX ADMIN — GUAIFENESIN 1200 MG: 600 TABLET, EXTENDED RELEASE ORAL at 21:19

## 2019-07-21 RX ADMIN — MUPIROCIN 1 APPLICATION: 20 OINTMENT TOPICAL at 08:05

## 2019-07-21 RX ADMIN — PAROXETINE HYDROCHLORIDE 25 MG: 25 TABLET, FILM COATED, EXTENDED RELEASE ORAL at 21:18

## 2019-07-21 RX ADMIN — AMIODARONE HYDROCHLORIDE 200 MG: 200 TABLET ORAL at 08:07

## 2019-07-21 RX ADMIN — CHLORHEXIDINE GLUCONATE 15 ML: 1.2 RINSE ORAL at 05:29

## 2019-07-21 RX ADMIN — METOPROLOL TARTRATE 12.5 MG: 25 TABLET ORAL at 08:06

## 2019-07-21 RX ADMIN — INSULIN LISPRO 3 UNITS: 100 INJECTION, SOLUTION INTRAVENOUS; SUBCUTANEOUS at 06:20

## 2019-07-21 RX ADMIN — PAROXETINE HYDROCHLORIDE 25 MG: 25 TABLET, FILM COATED, EXTENDED RELEASE ORAL at 01:36

## 2019-07-21 RX ADMIN — AMIODARONE HYDROCHLORIDE 200 MG: 200 TABLET ORAL at 18:29

## 2019-07-21 RX ADMIN — ATORVASTATIN CALCIUM 40 MG: 20 TABLET, FILM COATED ORAL at 21:18

## 2019-07-21 RX ADMIN — INSULIN LISPRO 3 UNITS: 100 INJECTION, SOLUTION INTRAVENOUS; SUBCUTANEOUS at 21:19

## 2019-07-21 RX ADMIN — ALPRAZOLAM 0.25 MG: 0.25 TABLET ORAL at 22:37

## 2019-07-21 RX ADMIN — ONDANSETRON 4 MG: 2 INJECTION INTRAMUSCULAR; INTRAVENOUS at 11:45

## 2019-07-21 RX ADMIN — CHLORHEXIDINE GLUCONATE 15 ML: 1.2 RINSE ORAL at 18:29

## 2019-07-21 RX ADMIN — HYDROCODONE BITARTRATE AND ACETAMINOPHEN 2 TABLET: 5; 325 TABLET ORAL at 22:37

## 2019-07-21 RX ADMIN — IPRATROPIUM BROMIDE AND ALBUTEROL SULFATE 3 ML: 2.5; .5 SOLUTION RESPIRATORY (INHALATION) at 06:50

## 2019-07-21 RX ADMIN — ENOXAPARIN SODIUM 40 MG: 40 INJECTION SUBCUTANEOUS at 18:29

## 2019-07-22 ENCOUNTER — APPOINTMENT (OUTPATIENT)
Dept: GENERAL RADIOLOGY | Facility: HOSPITAL | Age: 69
End: 2019-07-22

## 2019-07-22 LAB
ANION GAP SERPL CALCULATED.3IONS-SCNC: 13 MMOL/L (ref 5–15)
BUN BLD-MCNC: 43 MG/DL (ref 8–23)
BUN/CREAT SERPL: 25.6 (ref 7–25)
CALCIUM SPEC-SCNC: 7.6 MG/DL (ref 8.6–10.5)
CALCIUM SPEC-SCNC: 7.9 MG/DL (ref 8.6–10.5)
CHLORIDE SERPL-SCNC: 94 MMOL/L (ref 98–107)
CO2 SERPL-SCNC: 29 MMOL/L (ref 22–29)
CORTIS SERPL-MCNC: 13.34 MCG/DL
CREAT BLD-MCNC: 1.68 MG/DL (ref 0.76–1.27)
DEPRECATED RDW RBC AUTO: 48.5 FL (ref 37–54)
ERYTHROCYTE [DISTWIDTH] IN BLOOD BY AUTOMATED COUNT: 13.8 % (ref 12.3–15.4)
GFR SERPL CREATININE-BSD FRML MDRD: 41 ML/MIN/1.73
GLUCOSE BLD-MCNC: 140 MG/DL (ref 65–99)
GLUCOSE BLDC GLUCOMTR-MCNC: 135 MG/DL (ref 70–130)
GLUCOSE BLDC GLUCOMTR-MCNC: 163 MG/DL (ref 70–130)
GLUCOSE BLDC GLUCOMTR-MCNC: 165 MG/DL (ref 70–130)
GLUCOSE BLDC GLUCOMTR-MCNC: 236 MG/DL (ref 70–130)
HCT VFR BLD AUTO: 28.2 % (ref 37.5–51)
HGB BLD-MCNC: 9.2 G/DL (ref 13–17.7)
MAGNESIUM SERPL-MCNC: 2.5 MG/DL (ref 1.6–2.4)
MCH RBC QN AUTO: 31.3 PG (ref 26.6–33)
MCHC RBC AUTO-ENTMCNC: 32.6 G/DL (ref 31.5–35.7)
MCV RBC AUTO: 95.9 FL (ref 79–97)
PLATELET # BLD AUTO: 133 10*3/MM3 (ref 140–450)
PMV BLD AUTO: 12 FL (ref 6–12)
POTASSIUM BLD-SCNC: 3.3 MMOL/L (ref 3.5–5.2)
POTASSIUM BLD-SCNC: 4.1 MMOL/L (ref 3.5–5.2)
PTH-INTACT SERPL-MCNC: 214 PG/ML (ref 15–65)
RBC # BLD AUTO: 2.94 10*6/MM3 (ref 4.14–5.8)
SODIUM BLD-SCNC: 136 MMOL/L (ref 136–145)
TSH SERPL DL<=0.05 MIU/L-ACNC: 1.51 MIU/ML (ref 0.27–4.2)
VIT B12 BLD-MCNC: 842 PG/ML (ref 211–946)
WBC NRBC COR # BLD: 8.95 10*3/MM3 (ref 3.4–10.8)

## 2019-07-22 PROCEDURE — 94799 UNLISTED PULMONARY SVC/PX: CPT

## 2019-07-22 PROCEDURE — 99024 POSTOP FOLLOW-UP VISIT: CPT | Performed by: NURSE PRACTITIONER

## 2019-07-22 PROCEDURE — 97112 NEUROMUSCULAR REEDUCATION: CPT

## 2019-07-22 PROCEDURE — 97165 OT EVAL LOW COMPLEX 30 MIN: CPT

## 2019-07-22 PROCEDURE — 84132 ASSAY OF SERUM POTASSIUM: CPT | Performed by: THORACIC SURGERY (CARDIOTHORACIC VASCULAR SURGERY)

## 2019-07-22 PROCEDURE — 25010000002 ENOXAPARIN PER 10 MG: Performed by: THORACIC SURGERY (CARDIOTHORACIC VASCULAR SURGERY)

## 2019-07-22 PROCEDURE — 82533 TOTAL CORTISOL: CPT | Performed by: NURSE PRACTITIONER

## 2019-07-22 PROCEDURE — 99232 SBSQ HOSP IP/OBS MODERATE 35: CPT | Performed by: NURSE PRACTITIONER

## 2019-07-22 PROCEDURE — 97110 THERAPEUTIC EXERCISES: CPT

## 2019-07-22 PROCEDURE — 85027 COMPLETE CBC AUTOMATED: CPT | Performed by: THORACIC SURGERY (CARDIOTHORACIC VASCULAR SURGERY)

## 2019-07-22 PROCEDURE — 82607 VITAMIN B-12: CPT | Performed by: NURSE PRACTITIONER

## 2019-07-22 PROCEDURE — 83735 ASSAY OF MAGNESIUM: CPT | Performed by: NURSE PRACTITIONER

## 2019-07-22 PROCEDURE — 63710000001 INSULIN LISPRO (HUMAN) PER 5 UNITS: Performed by: NURSE PRACTITIONER

## 2019-07-22 PROCEDURE — 84443 ASSAY THYROID STIM HORMONE: CPT | Performed by: NURSE PRACTITIONER

## 2019-07-22 PROCEDURE — 80048 BASIC METABOLIC PNL TOTAL CA: CPT | Performed by: THORACIC SURGERY (CARDIOTHORACIC VASCULAR SURGERY)

## 2019-07-22 PROCEDURE — 82962 GLUCOSE BLOOD TEST: CPT

## 2019-07-22 PROCEDURE — 83970 ASSAY OF PARATHORMONE: CPT | Performed by: NURSE PRACTITIONER

## 2019-07-22 PROCEDURE — 71045 X-RAY EXAM CHEST 1 VIEW: CPT

## 2019-07-22 RX ORDER — POTASSIUM CHLORIDE 750 MG/1
40 CAPSULE, EXTENDED RELEASE ORAL DAILY
Status: DISCONTINUED | OUTPATIENT
Start: 2019-07-22 | End: 2019-07-25 | Stop reason: HOSPADM

## 2019-07-22 RX ORDER — FUROSEMIDE 40 MG/1
40 TABLET ORAL DAILY
Status: DISCONTINUED | OUTPATIENT
Start: 2019-07-22 | End: 2019-07-25 | Stop reason: HOSPADM

## 2019-07-22 RX ADMIN — AMIODARONE HYDROCHLORIDE 200 MG: 200 TABLET ORAL at 08:53

## 2019-07-22 RX ADMIN — PANTOPRAZOLE SODIUM 40 MG: 40 TABLET, DELAYED RELEASE ORAL at 08:53

## 2019-07-22 RX ADMIN — TAMSULOSIN HYDROCHLORIDE 0.4 MG: 0.4 CAPSULE ORAL at 08:53

## 2019-07-22 RX ADMIN — IPRATROPIUM BROMIDE AND ALBUTEROL SULFATE 3 ML: 2.5; .5 SOLUTION RESPIRATORY (INHALATION) at 12:48

## 2019-07-22 RX ADMIN — IPRATROPIUM BROMIDE AND ALBUTEROL SULFATE 3 ML: 2.5; .5 SOLUTION RESPIRATORY (INHALATION) at 09:20

## 2019-07-22 RX ADMIN — METOPROLOL TARTRATE 12.5 MG: 25 TABLET ORAL at 20:14

## 2019-07-22 RX ADMIN — POTASSIUM CHLORIDE 40 MEQ: 750 CAPSULE, EXTENDED RELEASE ORAL at 08:53

## 2019-07-22 RX ADMIN — METOPROLOL TARTRATE 12.5 MG: 25 TABLET ORAL at 08:53

## 2019-07-22 RX ADMIN — INSULIN LISPRO 5 UNITS: 100 INJECTION, SOLUTION INTRAVENOUS; SUBCUTANEOUS at 20:14

## 2019-07-22 RX ADMIN — PRAMIPEXOLE DIHYDROCHLORIDE 0.12 MG: 0.25 TABLET ORAL at 08:53

## 2019-07-22 RX ADMIN — IPRATROPIUM BROMIDE AND ALBUTEROL SULFATE 3 ML: 2.5; .5 SOLUTION RESPIRATORY (INHALATION) at 19:25

## 2019-07-22 RX ADMIN — ATORVASTATIN CALCIUM 40 MG: 20 TABLET, FILM COATED ORAL at 20:14

## 2019-07-22 RX ADMIN — INSULIN LISPRO 3 UNITS: 100 INJECTION, SOLUTION INTRAVENOUS; SUBCUTANEOUS at 12:28

## 2019-07-22 RX ADMIN — PAROXETINE HYDROCHLORIDE 25 MG: 25 TABLET, FILM COATED, EXTENDED RELEASE ORAL at 20:14

## 2019-07-22 RX ADMIN — ASPIRIN 325 MG: 325 TABLET, COATED ORAL at 08:53

## 2019-07-22 RX ADMIN — SENNOSIDES AND DOCUSATE SODIUM 2 TABLET: 8.6; 5 TABLET ORAL at 20:14

## 2019-07-22 RX ADMIN — GUAIFENESIN 1200 MG: 600 TABLET, EXTENDED RELEASE ORAL at 08:53

## 2019-07-22 RX ADMIN — MUPIROCIN 1 APPLICATION: 20 OINTMENT TOPICAL at 08:52

## 2019-07-22 RX ADMIN — INSULIN LISPRO 3 UNITS: 100 INJECTION, SOLUTION INTRAVENOUS; SUBCUTANEOUS at 17:40

## 2019-07-22 RX ADMIN — HYDROCODONE BITARTRATE AND ACETAMINOPHEN 2 TABLET: 5; 325 TABLET ORAL at 16:46

## 2019-07-22 RX ADMIN — POTASSIUM CHLORIDE 40 MEQ: 750 CAPSULE, EXTENDED RELEASE ORAL at 15:07

## 2019-07-22 RX ADMIN — PRAMIPEXOLE DIHYDROCHLORIDE 0.12 MG: 0.25 TABLET ORAL at 20:14

## 2019-07-22 RX ADMIN — GUAIFENESIN 1200 MG: 600 TABLET, EXTENDED RELEASE ORAL at 20:14

## 2019-07-22 RX ADMIN — AMIODARONE HYDROCHLORIDE 200 MG: 200 TABLET ORAL at 17:40

## 2019-07-22 RX ADMIN — FUROSEMIDE 40 MG: 40 TABLET ORAL at 08:53

## 2019-07-22 RX ADMIN — IPRATROPIUM BROMIDE AND ALBUTEROL SULFATE 3 ML: 2.5; .5 SOLUTION RESPIRATORY (INHALATION) at 16:29

## 2019-07-22 RX ADMIN — ENOXAPARIN SODIUM 40 MG: 40 INJECTION SUBCUTANEOUS at 17:41

## 2019-07-23 ENCOUNTER — APPOINTMENT (OUTPATIENT)
Dept: GENERAL RADIOLOGY | Facility: HOSPITAL | Age: 69
End: 2019-07-23

## 2019-07-23 LAB
ANION GAP SERPL CALCULATED.3IONS-SCNC: 12.5 MMOL/L (ref 5–15)
BUN BLD-MCNC: 43 MG/DL (ref 8–23)
BUN/CREAT SERPL: 31.2 (ref 7–25)
CALCIUM SPEC-SCNC: 8.4 MG/DL (ref 8.6–10.5)
CHLORIDE SERPL-SCNC: 97 MMOL/L (ref 98–107)
CO2 SERPL-SCNC: 30.5 MMOL/L (ref 22–29)
CREAT BLD-MCNC: 1.38 MG/DL (ref 0.76–1.27)
GFR SERPL CREATININE-BSD FRML MDRD: 51 ML/MIN/1.73
GLUCOSE BLD-MCNC: 200 MG/DL (ref 65–99)
GLUCOSE BLDC GLUCOMTR-MCNC: 175 MG/DL (ref 70–130)
GLUCOSE BLDC GLUCOMTR-MCNC: 178 MG/DL (ref 70–130)
GLUCOSE BLDC GLUCOMTR-MCNC: 202 MG/DL (ref 70–130)
GLUCOSE BLDC GLUCOMTR-MCNC: 267 MG/DL (ref 70–130)
MAGNESIUM SERPL-MCNC: 2.3 MG/DL (ref 1.6–2.4)
POTASSIUM BLD-SCNC: 4.1 MMOL/L (ref 3.5–5.2)
SODIUM BLD-SCNC: 140 MMOL/L (ref 136–145)

## 2019-07-23 PROCEDURE — 83735 ASSAY OF MAGNESIUM: CPT | Performed by: THORACIC SURGERY (CARDIOTHORACIC VASCULAR SURGERY)

## 2019-07-23 PROCEDURE — 97110 THERAPEUTIC EXERCISES: CPT

## 2019-07-23 PROCEDURE — 63710000001 INSULIN LISPRO (HUMAN) PER 5 UNITS: Performed by: NURSE PRACTITIONER

## 2019-07-23 PROCEDURE — 25010000002 ENOXAPARIN PER 10 MG: Performed by: THORACIC SURGERY (CARDIOTHORACIC VASCULAR SURGERY)

## 2019-07-23 PROCEDURE — 99024 POSTOP FOLLOW-UP VISIT: CPT | Performed by: THORACIC SURGERY (CARDIOTHORACIC VASCULAR SURGERY)

## 2019-07-23 PROCEDURE — 80048 BASIC METABOLIC PNL TOTAL CA: CPT | Performed by: THORACIC SURGERY (CARDIOTHORACIC VASCULAR SURGERY)

## 2019-07-23 PROCEDURE — 94799 UNLISTED PULMONARY SVC/PX: CPT

## 2019-07-23 PROCEDURE — 99233 SBSQ HOSP IP/OBS HIGH 50: CPT | Performed by: INTERNAL MEDICINE

## 2019-07-23 PROCEDURE — 93010 ELECTROCARDIOGRAM REPORT: CPT | Performed by: INTERNAL MEDICINE

## 2019-07-23 PROCEDURE — 82962 GLUCOSE BLOOD TEST: CPT

## 2019-07-23 PROCEDURE — 93005 ELECTROCARDIOGRAM TRACING: CPT | Performed by: NURSE PRACTITIONER

## 2019-07-23 PROCEDURE — 71045 X-RAY EXAM CHEST 1 VIEW: CPT

## 2019-07-23 RX ORDER — ALPRAZOLAM 0.25 MG/1
0.25 TABLET ORAL EVERY 12 HOURS PRN
Status: DISCONTINUED | OUTPATIENT
Start: 2019-07-23 | End: 2019-07-25 | Stop reason: HOSPADM

## 2019-07-23 RX ADMIN — METOPROLOL TARTRATE 12.5 MG: 25 TABLET ORAL at 09:09

## 2019-07-23 RX ADMIN — POTASSIUM CHLORIDE 40 MEQ: 750 CAPSULE, EXTENDED RELEASE ORAL at 09:09

## 2019-07-23 RX ADMIN — IPRATROPIUM BROMIDE AND ALBUTEROL SULFATE 3 ML: 2.5; .5 SOLUTION RESPIRATORY (INHALATION) at 11:42

## 2019-07-23 RX ADMIN — INSULIN LISPRO 3 UNITS: 100 INJECTION, SOLUTION INTRAVENOUS; SUBCUTANEOUS at 12:18

## 2019-07-23 RX ADMIN — GUAIFENESIN 1200 MG: 600 TABLET, EXTENDED RELEASE ORAL at 20:17

## 2019-07-23 RX ADMIN — PRAMIPEXOLE DIHYDROCHLORIDE 0.12 MG: 0.25 TABLET ORAL at 20:16

## 2019-07-23 RX ADMIN — MUPIROCIN 1 APPLICATION: 20 OINTMENT TOPICAL at 09:09

## 2019-07-23 RX ADMIN — HYDROCODONE BITARTRATE AND ACETAMINOPHEN 2 TABLET: 5; 325 TABLET ORAL at 00:37

## 2019-07-23 RX ADMIN — IPRATROPIUM BROMIDE AND ALBUTEROL SULFATE 3 ML: 2.5; .5 SOLUTION RESPIRATORY (INHALATION) at 16:56

## 2019-07-23 RX ADMIN — AMIODARONE HYDROCHLORIDE 200 MG: 200 TABLET ORAL at 09:08

## 2019-07-23 RX ADMIN — HYDROCODONE BITARTRATE AND ACETAMINOPHEN 2 TABLET: 5; 325 TABLET ORAL at 06:36

## 2019-07-23 RX ADMIN — INSULIN LISPRO 3 UNITS: 100 INJECTION, SOLUTION INTRAVENOUS; SUBCUTANEOUS at 18:00

## 2019-07-23 RX ADMIN — IPRATROPIUM BROMIDE AND ALBUTEROL SULFATE 3 ML: 2.5; .5 SOLUTION RESPIRATORY (INHALATION) at 19:49

## 2019-07-23 RX ADMIN — AMIODARONE HYDROCHLORIDE 200 MG: 200 TABLET ORAL at 18:00

## 2019-07-23 RX ADMIN — ENOXAPARIN SODIUM 40 MG: 40 INJECTION SUBCUTANEOUS at 18:00

## 2019-07-23 RX ADMIN — ATORVASTATIN CALCIUM 40 MG: 20 TABLET, FILM COATED ORAL at 20:16

## 2019-07-23 RX ADMIN — PRAMIPEXOLE DIHYDROCHLORIDE 0.12 MG: 0.25 TABLET ORAL at 09:08

## 2019-07-23 RX ADMIN — METOPROLOL TARTRATE 12.5 MG: 25 TABLET ORAL at 20:16

## 2019-07-23 RX ADMIN — INSULIN LISPRO 5 UNITS: 100 INJECTION, SOLUTION INTRAVENOUS; SUBCUTANEOUS at 06:37

## 2019-07-23 RX ADMIN — INSULIN LISPRO 8 UNITS: 100 INJECTION, SOLUTION INTRAVENOUS; SUBCUTANEOUS at 20:16

## 2019-07-23 RX ADMIN — PANTOPRAZOLE SODIUM 40 MG: 40 TABLET, DELAYED RELEASE ORAL at 06:36

## 2019-07-23 RX ADMIN — FUROSEMIDE 40 MG: 40 TABLET ORAL at 09:09

## 2019-07-23 RX ADMIN — ALPRAZOLAM 0.25 MG: 0.25 TABLET ORAL at 00:38

## 2019-07-23 RX ADMIN — PAROXETINE HYDROCHLORIDE 25 MG: 25 TABLET, FILM COATED, EXTENDED RELEASE ORAL at 20:17

## 2019-07-23 RX ADMIN — ASPIRIN 325 MG: 325 TABLET, COATED ORAL at 09:09

## 2019-07-23 RX ADMIN — GUAIFENESIN 1200 MG: 600 TABLET, EXTENDED RELEASE ORAL at 09:09

## 2019-07-23 RX ADMIN — TAMSULOSIN HYDROCHLORIDE 0.4 MG: 0.4 CAPSULE ORAL at 09:09

## 2019-07-23 RX ADMIN — SENNOSIDES AND DOCUSATE SODIUM 2 TABLET: 8.6; 5 TABLET ORAL at 20:17

## 2019-07-24 LAB
ANION GAP SERPL CALCULATED.3IONS-SCNC: 12 MMOL/L (ref 5–15)
BUN BLD-MCNC: 37 MG/DL (ref 8–23)
BUN/CREAT SERPL: 30.8 (ref 7–25)
CALCIUM SPEC-SCNC: 8.8 MG/DL (ref 8.6–10.5)
CHLORIDE SERPL-SCNC: 96 MMOL/L (ref 98–107)
CO2 SERPL-SCNC: 32 MMOL/L (ref 22–29)
CREAT BLD-MCNC: 1.2 MG/DL (ref 0.76–1.27)
GFR SERPL CREATININE-BSD FRML MDRD: 60 ML/MIN/1.73
GLUCOSE BLD-MCNC: 185 MG/DL (ref 65–99)
GLUCOSE BLDC GLUCOMTR-MCNC: 180 MG/DL (ref 70–130)
GLUCOSE BLDC GLUCOMTR-MCNC: 211 MG/DL (ref 70–130)
GLUCOSE BLDC GLUCOMTR-MCNC: 220 MG/DL (ref 70–130)
GLUCOSE BLDC GLUCOMTR-MCNC: 257 MG/DL (ref 70–130)
POTASSIUM BLD-SCNC: 4.1 MMOL/L (ref 3.5–5.2)
SODIUM BLD-SCNC: 140 MMOL/L (ref 136–145)

## 2019-07-24 PROCEDURE — 99232 SBSQ HOSP IP/OBS MODERATE 35: CPT | Performed by: NURSE PRACTITIONER

## 2019-07-24 PROCEDURE — 63710000001 INSULIN GLARGINE PER 5 UNITS: Performed by: NURSE PRACTITIONER

## 2019-07-24 PROCEDURE — 82962 GLUCOSE BLOOD TEST: CPT

## 2019-07-24 PROCEDURE — 94799 UNLISTED PULMONARY SVC/PX: CPT

## 2019-07-24 PROCEDURE — 99024 POSTOP FOLLOW-UP VISIT: CPT | Performed by: THORACIC SURGERY (CARDIOTHORACIC VASCULAR SURGERY)

## 2019-07-24 PROCEDURE — 80048 BASIC METABOLIC PNL TOTAL CA: CPT | Performed by: THORACIC SURGERY (CARDIOTHORACIC VASCULAR SURGERY)

## 2019-07-24 PROCEDURE — 25010000002 ENOXAPARIN PER 10 MG: Performed by: THORACIC SURGERY (CARDIOTHORACIC VASCULAR SURGERY)

## 2019-07-24 PROCEDURE — 63710000001 INSULIN LISPRO (HUMAN) PER 5 UNITS: Performed by: NURSE PRACTITIONER

## 2019-07-24 PROCEDURE — 97110 THERAPEUTIC EXERCISES: CPT

## 2019-07-24 RX ORDER — INSULIN GLARGINE 100 [IU]/ML
21 INJECTION, SOLUTION SUBCUTANEOUS
Status: DISCONTINUED | OUTPATIENT
Start: 2019-07-24 | End: 2019-07-25 | Stop reason: HOSPADM

## 2019-07-24 RX ORDER — HYDRALAZINE HYDROCHLORIDE 10 MG/1
10 TABLET, FILM COATED ORAL EVERY 12 HOURS PRN
Status: DISCONTINUED | OUTPATIENT
Start: 2019-07-24 | End: 2019-07-25 | Stop reason: HOSPADM

## 2019-07-24 RX ORDER — HYDRALAZINE HYDROCHLORIDE 10 MG/1
10 TABLET, FILM COATED ORAL EVERY 8 HOURS SCHEDULED
Status: DISCONTINUED | OUTPATIENT
Start: 2019-07-24 | End: 2019-07-24

## 2019-07-24 RX ADMIN — ATORVASTATIN CALCIUM 40 MG: 20 TABLET, FILM COATED ORAL at 20:29

## 2019-07-24 RX ADMIN — TAMSULOSIN HYDROCHLORIDE 0.4 MG: 0.4 CAPSULE ORAL at 09:39

## 2019-07-24 RX ADMIN — ENOXAPARIN SODIUM 40 MG: 40 INJECTION SUBCUTANEOUS at 17:29

## 2019-07-24 RX ADMIN — HYDROCODONE BITARTRATE AND ACETAMINOPHEN 2 TABLET: 5; 325 TABLET ORAL at 01:52

## 2019-07-24 RX ADMIN — PANTOPRAZOLE SODIUM 40 MG: 40 TABLET, DELAYED RELEASE ORAL at 06:36

## 2019-07-24 RX ADMIN — POTASSIUM CHLORIDE 40 MEQ: 750 CAPSULE, EXTENDED RELEASE ORAL at 09:39

## 2019-07-24 RX ADMIN — HYDROCODONE BITARTRATE AND ACETAMINOPHEN 2 TABLET: 5; 325 TABLET ORAL at 20:29

## 2019-07-24 RX ADMIN — SENNOSIDES AND DOCUSATE SODIUM 2 TABLET: 8.6; 5 TABLET ORAL at 20:29

## 2019-07-24 RX ADMIN — IPRATROPIUM BROMIDE AND ALBUTEROL SULFATE 3 ML: 2.5; .5 SOLUTION RESPIRATORY (INHALATION) at 19:16

## 2019-07-24 RX ADMIN — INSULIN LISPRO 8 UNITS: 100 INJECTION, SOLUTION INTRAVENOUS; SUBCUTANEOUS at 12:34

## 2019-07-24 RX ADMIN — PRAMIPEXOLE DIHYDROCHLORIDE 0.12 MG: 0.25 TABLET ORAL at 09:38

## 2019-07-24 RX ADMIN — IPRATROPIUM BROMIDE AND ALBUTEROL SULFATE 3 ML: 2.5; .5 SOLUTION RESPIRATORY (INHALATION) at 07:59

## 2019-07-24 RX ADMIN — PRAMIPEXOLE DIHYDROCHLORIDE 0.12 MG: 0.25 TABLET ORAL at 20:30

## 2019-07-24 RX ADMIN — AMIODARONE HYDROCHLORIDE 200 MG: 200 TABLET ORAL at 09:38

## 2019-07-24 RX ADMIN — HYDRALAZINE HYDROCHLORIDE 10 MG: 10 TABLET, FILM COATED ORAL at 09:39

## 2019-07-24 RX ADMIN — METOPROLOL TARTRATE 12.5 MG: 25 TABLET ORAL at 20:30

## 2019-07-24 RX ADMIN — GUAIFENESIN 1200 MG: 600 TABLET, EXTENDED RELEASE ORAL at 09:38

## 2019-07-24 RX ADMIN — GUAIFENESIN 1200 MG: 600 TABLET, EXTENDED RELEASE ORAL at 20:29

## 2019-07-24 RX ADMIN — ASPIRIN 325 MG: 325 TABLET, COATED ORAL at 09:38

## 2019-07-24 RX ADMIN — INSULIN LISPRO 5 UNITS: 100 INJECTION, SOLUTION INTRAVENOUS; SUBCUTANEOUS at 17:29

## 2019-07-24 RX ADMIN — AMIODARONE HYDROCHLORIDE 200 MG: 200 TABLET ORAL at 17:29

## 2019-07-24 RX ADMIN — FUROSEMIDE 40 MG: 40 TABLET ORAL at 09:39

## 2019-07-24 RX ADMIN — PAROXETINE HYDROCHLORIDE 25 MG: 25 TABLET, FILM COATED, EXTENDED RELEASE ORAL at 20:30

## 2019-07-24 RX ADMIN — INSULIN LISPRO 5 UNITS: 100 INJECTION, SOLUTION INTRAVENOUS; SUBCUTANEOUS at 06:44

## 2019-07-24 RX ADMIN — METOPROLOL TARTRATE 12.5 MG: 25 TABLET ORAL at 09:39

## 2019-07-24 RX ADMIN — INSULIN GLARGINE 21 UNITS: 100 INJECTION, SOLUTION SUBCUTANEOUS at 17:28

## 2019-07-24 RX ADMIN — HYDROCODONE BITARTRATE AND ACETAMINOPHEN 2 TABLET: 5; 325 TABLET ORAL at 06:36

## 2019-07-24 RX ADMIN — MUPIROCIN 1 APPLICATION: 20 OINTMENT TOPICAL at 09:39

## 2019-07-24 RX ADMIN — INSULIN LISPRO 3 UNITS: 100 INJECTION, SOLUTION INTRAVENOUS; SUBCUTANEOUS at 20:30

## 2019-07-24 RX ADMIN — IPRATROPIUM BROMIDE AND ALBUTEROL SULFATE 3 ML: 2.5; .5 SOLUTION RESPIRATORY (INHALATION) at 16:01

## 2019-07-24 RX ADMIN — ALPRAZOLAM 0.25 MG: 0.25 TABLET ORAL at 20:29

## 2019-07-24 RX ADMIN — IPRATROPIUM BROMIDE AND ALBUTEROL SULFATE 3 ML: 2.5; .5 SOLUTION RESPIRATORY (INHALATION) at 11:41

## 2019-07-25 VITALS
HEART RATE: 63 BPM | DIASTOLIC BLOOD PRESSURE: 71 MMHG | SYSTOLIC BLOOD PRESSURE: 122 MMHG | RESPIRATION RATE: 16 BRPM | TEMPERATURE: 98.1 F | OXYGEN SATURATION: 90 % | BODY MASS INDEX: 29.36 KG/M2 | WEIGHT: 193.7 LBS | HEIGHT: 68 IN

## 2019-07-25 LAB
ANION GAP SERPL CALCULATED.3IONS-SCNC: 6.9 MMOL/L (ref 5–15)
BUN BLD-MCNC: 27 MG/DL (ref 8–23)
BUN/CREAT SERPL: 24.5 (ref 7–25)
CALCIUM SPEC-SCNC: 9.1 MG/DL (ref 8.6–10.5)
CHLORIDE SERPL-SCNC: 96 MMOL/L (ref 98–107)
CO2 SERPL-SCNC: 35.1 MMOL/L (ref 22–29)
CREAT BLD-MCNC: 1.1 MG/DL (ref 0.76–1.27)
DEPRECATED RDW RBC AUTO: 50 FL (ref 37–54)
ERYTHROCYTE [DISTWIDTH] IN BLOOD BY AUTOMATED COUNT: 14 % (ref 12.3–15.4)
GFR SERPL CREATININE-BSD FRML MDRD: 66 ML/MIN/1.73
GLUCOSE BLD-MCNC: 140 MG/DL (ref 65–99)
GLUCOSE BLDC GLUCOMTR-MCNC: 159 MG/DL (ref 70–130)
GLUCOSE BLDC GLUCOMTR-MCNC: 187 MG/DL (ref 70–130)
GLUCOSE BLDC GLUCOMTR-MCNC: 188 MG/DL (ref 70–130)
HCT VFR BLD AUTO: 30.6 % (ref 37.5–51)
HGB BLD-MCNC: 9.8 G/DL (ref 13–17.7)
MAGNESIUM SERPL-MCNC: 1.9 MG/DL (ref 1.6–2.4)
MCH RBC QN AUTO: 31.1 PG (ref 26.6–33)
MCHC RBC AUTO-ENTMCNC: 32 G/DL (ref 31.5–35.7)
MCV RBC AUTO: 97.1 FL (ref 79–97)
PLATELET # BLD AUTO: 212 10*3/MM3 (ref 140–450)
PMV BLD AUTO: 11.4 FL (ref 6–12)
POTASSIUM BLD-SCNC: 4.4 MMOL/L (ref 3.5–5.2)
RBC # BLD AUTO: 3.15 10*6/MM3 (ref 4.14–5.8)
SODIUM BLD-SCNC: 138 MMOL/L (ref 136–145)
WBC NRBC COR # BLD: 6.47 10*3/MM3 (ref 3.4–10.8)

## 2019-07-25 PROCEDURE — 83735 ASSAY OF MAGNESIUM: CPT | Performed by: THORACIC SURGERY (CARDIOTHORACIC VASCULAR SURGERY)

## 2019-07-25 PROCEDURE — 63710000001 INSULIN LISPRO (HUMAN) PER 5 UNITS: Performed by: NURSE PRACTITIONER

## 2019-07-25 PROCEDURE — 82962 GLUCOSE BLOOD TEST: CPT

## 2019-07-25 PROCEDURE — 94799 UNLISTED PULMONARY SVC/PX: CPT

## 2019-07-25 PROCEDURE — 80048 BASIC METABOLIC PNL TOTAL CA: CPT | Performed by: THORACIC SURGERY (CARDIOTHORACIC VASCULAR SURGERY)

## 2019-07-25 PROCEDURE — 99024 POSTOP FOLLOW-UP VISIT: CPT | Performed by: THORACIC SURGERY (CARDIOTHORACIC VASCULAR SURGERY)

## 2019-07-25 PROCEDURE — 99238 HOSP IP/OBS DSCHRG MGMT 30/<: CPT | Performed by: NURSE PRACTITIONER

## 2019-07-25 PROCEDURE — 97110 THERAPEUTIC EXERCISES: CPT

## 2019-07-25 PROCEDURE — 85027 COMPLETE CBC AUTOMATED: CPT | Performed by: THORACIC SURGERY (CARDIOTHORACIC VASCULAR SURGERY)

## 2019-07-25 RX ORDER — PANTOPRAZOLE SODIUM 40 MG/1
40 TABLET, DELAYED RELEASE ORAL DAILY
Start: 2019-07-25 | End: 2019-08-19

## 2019-07-25 RX ORDER — ATORVASTATIN CALCIUM 40 MG/1
40 TABLET, FILM COATED ORAL NIGHTLY
Start: 2019-07-25 | End: 2019-09-11 | Stop reason: ALTCHOICE

## 2019-07-25 RX ORDER — IPRATROPIUM BROMIDE AND ALBUTEROL SULFATE 2.5; .5 MG/3ML; MG/3ML
3 SOLUTION RESPIRATORY (INHALATION)
Qty: 360 ML
Start: 2019-07-25 | End: 2019-08-19

## 2019-07-25 RX ORDER — IBUPROFEN 200 MG
TABLET ORAL EVERY 8 HOURS SCHEDULED
Status: DISCONTINUED | OUTPATIENT
Start: 2019-07-25 | End: 2019-07-25 | Stop reason: HOSPADM

## 2019-07-25 RX ORDER — HYDROCODONE BITARTRATE AND ACETAMINOPHEN 5; 325 MG/1; MG/1
1 TABLET ORAL EVERY 4 HOURS PRN
Qty: 42 TABLET | Refills: 0 | Status: SHIPPED | OUTPATIENT
Start: 2019-07-25 | End: 2019-07-28

## 2019-07-25 RX ORDER — PRAMIPEXOLE DIHYDROCHLORIDE 0.12 MG/1
0.12 TABLET ORAL 2 TIMES DAILY
Start: 2019-07-25 | End: 2019-08-19

## 2019-07-25 RX ORDER — POTASSIUM CHLORIDE 750 MG/1
40 CAPSULE, EXTENDED RELEASE ORAL DAILY
Start: 2019-07-26 | End: 2019-08-30 | Stop reason: SDUPTHER

## 2019-07-25 RX ORDER — FUROSEMIDE 40 MG/1
40 TABLET ORAL DAILY
Start: 2019-07-26 | End: 2019-08-30 | Stop reason: SDUPTHER

## 2019-07-25 RX ORDER — HYDRALAZINE HYDROCHLORIDE 25 MG/1
25 TABLET, FILM COATED ORAL 3 TIMES DAILY
Start: 2019-07-25 | End: 2019-10-21

## 2019-07-25 RX ORDER — HYDRALAZINE HYDROCHLORIDE 25 MG/1
25 TABLET, FILM COATED ORAL EVERY 8 HOURS SCHEDULED
Status: DISCONTINUED | OUTPATIENT
Start: 2019-07-25 | End: 2019-07-25 | Stop reason: HOSPADM

## 2019-07-25 RX ADMIN — INSULIN LISPRO 2 UNITS: 100 INJECTION, SOLUTION INTRAVENOUS; SUBCUTANEOUS at 11:44

## 2019-07-25 RX ADMIN — HYDROCODONE BITARTRATE AND ACETAMINOPHEN 2 TABLET: 5; 325 TABLET ORAL at 17:23

## 2019-07-25 RX ADMIN — INSULIN LISPRO 2 UNITS: 100 INJECTION, SOLUTION INTRAVENOUS; SUBCUTANEOUS at 17:23

## 2019-07-25 RX ADMIN — IPRATROPIUM BROMIDE AND ALBUTEROL SULFATE 3 ML: 2.5; .5 SOLUTION RESPIRATORY (INHALATION) at 14:09

## 2019-07-25 RX ADMIN — BACITRACIN ZINC NEOMYCIN SULFATE POLYMYXIN B SULFATE: 400; 3.5; 5 OINTMENT TOPICAL at 17:43

## 2019-07-25 RX ADMIN — IPRATROPIUM BROMIDE AND ALBUTEROL SULFATE 3 ML: 2.5; .5 SOLUTION RESPIRATORY (INHALATION) at 11:11

## 2019-07-25 RX ADMIN — PRAMIPEXOLE DIHYDROCHLORIDE 0.12 MG: 0.25 TABLET ORAL at 09:17

## 2019-07-25 RX ADMIN — POLYETHYLENE GLYCOL 3350 17 G: 17 POWDER, FOR SOLUTION ORAL at 10:29

## 2019-07-25 RX ADMIN — MUPIROCIN 1 APPLICATION: 20 OINTMENT TOPICAL at 09:17

## 2019-07-25 RX ADMIN — METOPROLOL TARTRATE 25 MG: 25 TABLET ORAL at 09:17

## 2019-07-25 RX ADMIN — PANTOPRAZOLE SODIUM 40 MG: 40 TABLET, DELAYED RELEASE ORAL at 06:50

## 2019-07-25 RX ADMIN — ASPIRIN 325 MG: 325 TABLET, COATED ORAL at 09:17

## 2019-07-25 RX ADMIN — HYDROCODONE BITARTRATE AND ACETAMINOPHEN 2 TABLET: 5; 325 TABLET ORAL at 06:49

## 2019-07-25 RX ADMIN — TAMSULOSIN HYDROCHLORIDE 0.4 MG: 0.4 CAPSULE ORAL at 09:17

## 2019-07-25 RX ADMIN — FUROSEMIDE 40 MG: 40 TABLET ORAL at 09:16

## 2019-07-25 RX ADMIN — HYDRALAZINE HYDROCHLORIDE 25 MG: 25 TABLET, FILM COATED ORAL at 11:44

## 2019-07-25 RX ADMIN — POTASSIUM CHLORIDE 40 MEQ: 750 CAPSULE, EXTENDED RELEASE ORAL at 09:17

## 2019-07-25 RX ADMIN — INSULIN LISPRO 3 UNITS: 100 INJECTION, SOLUTION INTRAVENOUS; SUBCUTANEOUS at 06:50

## 2019-07-25 RX ADMIN — IPRATROPIUM BROMIDE AND ALBUTEROL SULFATE 3 ML: 2.5; .5 SOLUTION RESPIRATORY (INHALATION) at 07:47

## 2019-07-25 RX ADMIN — GUAIFENESIN 1200 MG: 600 TABLET, EXTENDED RELEASE ORAL at 09:16

## 2019-08-09 ENCOUNTER — TELEPHONE (OUTPATIENT)
Dept: CARDIOLOGY | Facility: CLINIC | Age: 69
End: 2019-08-09

## 2019-08-09 NOTE — TELEPHONE ENCOUNTER
08/09/19  11:12 AM  Dilip Verma  1950  Home Phone [phone]       Dilip Verma is a patient of Dr Hylton. Nahomy from [facility] called office to report that patient /96  HR 78  No weight gain, lungs clear, neg JVD just started Metoprolol yesterday. Checking BP daily as instructed.    Please advise how to proceed    Thank you   Amanda Augustin, RN  Triage Nurse

## 2019-08-09 NOTE — TELEPHONE ENCOUNTER
Spoke with Dr Hylton in regards to BP. No changes at this time. Continue to monitor BP, if not coming down call office.    Amanda Augustin, RN  Triage RN

## 2019-08-16 ENCOUNTER — TELEPHONE (OUTPATIENT)
Dept: CARDIAC SURGERY | Facility: CLINIC | Age: 69
End: 2019-08-16

## 2019-08-16 NOTE — TELEPHONE ENCOUNTER
Called patient to triage further.  Wife is telling me that yesterday the pts wt was 191, they took lasix 40mg BID (ordered daily) yesterday.  Wt today 187.6.  His base wt around 186.    He is SOA, and has 1+ankle edema.  His symptoms have improved some since yesterday.    Cardiac meds reviewed  Furosemide 40mg villaseñor-took extra dose last night  Micro k 10meq 4 tabs daily  Hydralazine 25mg TID  Metoprolol tartrate 25mg BID    bp 153/93 after meds, no heart rate recorded.  Please let me know how you would like to proceed  Thanks  Renae Hernandez RN  Triage nurse

## 2019-08-16 NOTE — TELEPHONE ENCOUNTER
Pt's wife calls to report that the Pt has had a weight increase and wants to know what recommendations are for taking the lasix. Pt wife did not give additional details about how much weight was gained, vitals or list any other symptoms can you please contact the Pt for additional information    [phone]

## 2019-08-16 NOTE — TELEPHONE ENCOUNTER
Mr. Verma wife called stating that he is c/o SOA, weight gain and HTN. She states that the highest b/p readings have been 166/100 and 177/100 and his weight has been fluctuating from 186lbs - 191lbs. She is concerned that his medications need to be adjusted. I informed her that she would need to consult with Dr. Hylton because she is aware of existing condition and is monitoring his b/p and medications at this time. Mrs. Verma states that she would call right away to discuss with Dr. Hylton's assistant.

## 2019-08-19 ENCOUNTER — OFFICE VISIT (OUTPATIENT)
Dept: CARDIOLOGY | Facility: CLINIC | Age: 69
End: 2019-08-19

## 2019-08-19 VITALS
BODY MASS INDEX: 28.31 KG/M2 | HEART RATE: 65 BPM | WEIGHT: 186.8 LBS | HEIGHT: 68 IN | RESPIRATION RATE: 16 BRPM | SYSTOLIC BLOOD PRESSURE: 92 MMHG | DIASTOLIC BLOOD PRESSURE: 68 MMHG

## 2019-08-19 DIAGNOSIS — I10 ESSENTIAL HYPERTENSION: ICD-10-CM

## 2019-08-19 DIAGNOSIS — I21.4 NSTEMI (NON-ST ELEVATED MYOCARDIAL INFARCTION) (HCC): ICD-10-CM

## 2019-08-19 DIAGNOSIS — E78.2 MIXED HYPERLIPIDEMIA: ICD-10-CM

## 2019-08-19 DIAGNOSIS — I25.10 CORONARY ARTERY DISEASE INVOLVING NATIVE CORONARY ARTERY OF NATIVE HEART WITHOUT ANGINA PECTORIS: Primary | ICD-10-CM

## 2019-08-19 DIAGNOSIS — Z95.1 HX OF CABG: ICD-10-CM

## 2019-08-19 PROCEDURE — 99214 OFFICE O/P EST MOD 30 MIN: CPT | Performed by: INTERNAL MEDICINE

## 2019-08-19 PROCEDURE — 93000 ELECTROCARDIOGRAM COMPLETE: CPT | Performed by: INTERNAL MEDICINE

## 2019-08-19 RX ORDER — GABAPENTIN 300 MG/1
300 CAPSULE ORAL AS NEEDED
Refills: 0 | COMMUNITY
Start: 2019-08-12 | End: 2019-09-18 | Stop reason: SDUPTHER

## 2019-08-19 NOTE — PROGRESS NOTES
PATIENTINFORMATION    Date of Office Visit: 2019  Encounter Provider: Michaela Hylton MD  Place of Service: [facility] CARDIOLOGY  Patient Name: Dilip Verma  : 1950    Subjective:     Encounter Date:2019      Patient ID: Dilip Verma is a 69 y.o. male.      History of Present Illness    This is a gentleman who was hospitalized in 2019.  He has a history of hypertension, chronic diastolic heart failure, hyperlipidemia, obstructive sleep apnea, and diabetes.  He was admitted to [facility] in 2019 for syncope.  He was orthostatic with supine hypertension.  Clonidine was discontinue and hydralazine was increased.  He presented to [facility] on 2019, with uncontrolled blood pressure and blurry vision.  He had an abnormal BNP and troponin.  Echocardiogram on 2019, showed an ejection fraction of 53%, moderate left ventricular hypertrophy, mild to moderate left atrial enlargement, and no significant valvular heart disease.  Holter monitor in 2019 showed some nonsustained atrial tachycardia.  Because of his elevated troponin, he had a heart catheterization on 07/15/2019, which showed normal ejection fraction with severe multivessel coronary disease.  Carotid Doppler showed mild bilateral carotid artery stenosis. Transthoracic echocardiogram showed normal left ventricular function with an ejection fraction of 55%, grade 3 diastolic dysfunction, and no significant valvular heart disease, though severe pulmonary hypertension was noted.  Of note, on his heart catheterization, his left ventricular end-diastolic pressure was 18.  On 2019, he underwent bypass surgery with a left internal mammary artery to the left anterior descending, vein graft to the obtuse marginal one, vein graft to the distal right coronary artery.  He had some post-operative bradycardia and his beta-blocker was held.  He had a brief of post-operative atrial  "fibrillation and was discharged on amiodarone.        Review of Systems   Constitution: Negative for fever, malaise/fatigue, weight gain and weight loss.   HENT: Negative for ear pain, hearing loss, nosebleeds and sore throat.    Eyes: Positive for blurred vision. Negative for double vision, pain, vision loss in left eye and vision loss in right eye.   Cardiovascular: Positive for leg swelling.        See history of present illness.   Respiratory: Negative for cough, shortness of breath, sleep disturbances due to breathing, snoring and wheezing.    Endocrine: Negative for cold intolerance, heat intolerance and polyuria.   Skin: Negative for itching, poor wound healing and rash.   Musculoskeletal: Negative for joint pain, joint swelling and myalgias.   Gastrointestinal: Positive for nausea. Negative for abdominal pain, diarrhea, hematochezia and vomiting.   Genitourinary: Positive for hesitancy. Negative for hematuria.   Neurological: Positive for dizziness, light-headedness, numbness and paresthesias. Negative for seizures.   Psychiatric/Behavioral: Negative for depression. The patient is not nervous/anxious.            ECG 12 Lead  Date/Time: 8/19/2019 12:40 PM  Performed by: Michaela Hylton MD  Authorized by: Michaela Hylton MD   Comparison: compared with previous ECG from 7/23/2019  Similar to previous ECG  Rhythm: sinus rhythm  BPM: 65  T inversion: V3, V4, V5 and V6    Clinical impression: abnormal EKG               Objective:     BP 92/68 (BP Location: Left arm, Patient Position: Sitting)   Pulse 65   Resp 16   Ht 172.7 cm (68\")   Wt 84.7 kg (186 lb 12.8 oz)   BMI 28.40 kg/m²  Body mass index is 28.4 kg/m².     Physical Exam   Constitutional: He appears well-developed.   HENT:   Head: Normocephalic and atraumatic.   Eyes: Conjunctivae and lids are normal. Pupils are equal, round, and reactive to light. Lids are everted and swept, no foreign bodies found.   Neck: Normal range of motion. No JVD present. " Carotid bruit is not present. No tracheal deviation present. No thyroid mass present.   Cardiovascular: Normal rate, regular rhythm and normal heart sounds.   Pulses:       Dorsalis pedis pulses are 2+ on the right side, and 2+ on the left side.   Sternal wound is healing well   Pulmonary/Chest: Effort normal and breath sounds normal.   Abdominal: Normal appearance and bowel sounds are normal.   Musculoskeletal: Normal range of motion.   Neurological: He is alert. He has normal strength.   Skin: Skin is warm, dry and intact.   Psychiatric: He has a normal mood and affect. His behavior is normal.   Vitals reviewed.          Assessment/Plan:       1. Coronary Artery Disease  Assessment  • The patient has no angina    Subjective - Objective  • There is a history of previous coronary artery bypass graft on or around 7/18/2019  • Current antiplatelet therapy includes aspirin 81 mg    He was admitted to [facility] in July 2019 with hypertensive urgency and ruled in with positive troponin.  Heart catheterization showed multivessel disease and he underwent bypass surgery x3 on July 18, 2019.  Continue current medications.  Cardiac rehab.    2. Atrial Fibrillation and Atrial Flutter  Assessment  • The patient has paroxysmal atrial fibrillation  • The patient's CHADS2-VASc score is 4  • A [id number]-[id number] score of 2 or more is considered a high risk for a thromboembolic event    He had a brief episode of postoperative atrial fibrillation.  He is off antiarrhythmics and not requiring anticoagulation at this time.    3.  Hypertension.  The number he brings in from home are all elevated but his blood pressure is relatively low today.  I am not going to adjust his medicines at this time    4.  Hyperlipidemia on atorvastatin    5.  Carotid artery disease with bilateral mild stenosis.    6.  Diabetes    He had been on testosterone replacement.  I do not think he needs to be on it from a cardiac standpoint but I will defer to his primary  doctor    NP in 3 months. I will see him back 7/20    Orders Placed This Encounter   Procedures   • Cardiac Rehab Phase II     Standing Status:   Future     Standing Expiration Date:   8/19/2020     Order Specific Question:   Diagnosis     Answer:   Coronary artery disease with history of myocardial infarction without history of CABG [[id number]]     Order Specific Question:   Date of Onset (of Diagnosis)     Answer:   7/18/2019   • ECG 12 Lead     This order was created via procedure documentation        Discharge Medications           Accurate as of 8/19/19  1:23 PM. If you have any questions, ask your nurse or doctor.               Continue These Medications      Instructions Start Date   aspirin 325 MG EC tablet   325 mg, Oral, Daily      atorvastatin 40 MG tablet  Commonly known as:  LIPITOR   40 mg, Oral, Nightly      furosemide 40 MG tablet  Commonly known as:  LASIX   40 mg, Oral, Daily      gabapentin 300 MG capsule  Commonly known as:  NEURONTIN   300 mg, Oral, Every Night at Bedtime      HUMALOG 100 UNIT/ML injection  Generic drug:  insulin lispro   Subcutaneous, 3 Times Daily Before Meals, PER SS      hydrALAZINE 25 MG tablet  Commonly known as:  APRESOLINE   25 mg, Oral, 3 Times Daily      melatonin 3 MG tablet   10 mg, Oral, Nightly      metoprolol tartrate 25 MG tablet  Commonly known as:  LOPRESSOR   25 mg, Oral, Every 12 Hours Scheduled      potassium chloride 10 MEQ CR capsule  Commonly known as:  MICRO-K   40 mEq, Oral, Daily      tamsulosin 0.4 MG capsule 24 hr capsule  Commonly known as:  FLOMAX   1 capsule, Oral, 2 Times Daily, Wife states he takes it when he has trouble voiding, Also taking for high blood pressure.      Testosterone 20.25 MG/1.25GM (1.62%) gel   1 packet, Transdermal, Daily      TRESIBA FLEXTOUCH 100 UNIT/ML solution pen-injector injection  Generic drug:  insulin degludec   21 Units, Subcutaneous, Daily With Dinner      VITAMIN D PO   1 tablet, Oral, Daily         Stop These  Medications    ipratropium-albuterol 0.5-2.5 mg/3 ml nebulizer  Commonly known as:  DUO-NEB  Stopped by:  Michaela Hylton MD     pantoprazole 40 MG EC tablet  Commonly known as:  PROTONIX  Stopped by:  Michaela Hylton MD     PAXIL CR 25 MG 24 hr tablet  Generic drug:  PARoxetine CR  Stopped by:  Michaela Hylton MD     pramipexole 0.125 MG tablet  Commonly known as:  MIRAPEX  Stopped by:  MD Michaela Wise MD  08/19/19  1:23 PM

## 2019-08-20 NOTE — TELEPHONE ENCOUNTER
Patient called with instructions. Verbalized understanding.No questions at this time.    Amanda Augustin, LUC  [facility] Triage Nurse

## 2019-08-21 ENCOUNTER — OFFICE VISIT (OUTPATIENT)
Dept: CARDIAC SURGERY | Facility: CLINIC | Age: 69
End: 2019-08-21

## 2019-08-21 ENCOUNTER — TELEPHONE (OUTPATIENT)
Dept: CARDIAC REHAB | Facility: HOSPITAL | Age: 69
End: 2019-08-21

## 2019-08-21 ENCOUNTER — CLINICAL SUPPORT (OUTPATIENT)
Dept: CARDIAC SURGERY | Facility: CLINIC | Age: 69
End: 2019-08-21

## 2019-08-21 VITALS
HEART RATE: 88 BPM | WEIGHT: 180 LBS | SYSTOLIC BLOOD PRESSURE: 159 MMHG | DIASTOLIC BLOOD PRESSURE: 85 MMHG | BODY MASS INDEX: 26.66 KG/M2 | TEMPERATURE: 97.8 F | HEIGHT: 69 IN | OXYGEN SATURATION: 91 %

## 2019-08-21 DIAGNOSIS — I50.30 DIASTOLIC CONGESTIVE HEART FAILURE, UNSPECIFIED HF CHRONICITY (HCC): ICD-10-CM

## 2019-08-21 DIAGNOSIS — I11.0 HYPERTENSIVE HEART DISEASE WITH HEART FAILURE (HCC): Primary | ICD-10-CM

## 2019-08-21 DIAGNOSIS — Z95.1 PRESENCE OF AORTOCORONARY BYPASS GRAFT: ICD-10-CM

## 2019-08-21 DIAGNOSIS — Z95.1 S/P CABG (CORONARY ARTERY BYPASS GRAFT): Primary | ICD-10-CM

## 2019-08-21 DIAGNOSIS — Z48.812 ENCOUNTER FOR SURGICAL AFTERCARE FOLLOWING SURGERY OF CIRCULATORY SYSTEM: ICD-10-CM

## 2019-08-21 PROCEDURE — 99024 POSTOP FOLLOW-UP VISIT: CPT | Performed by: THORACIC SURGERY (CARDIOTHORACIC VASCULAR SURGERY)

## 2019-08-21 PROCEDURE — G0180 MD CERTIFICATION HHA PATIENT: HCPCS | Performed by: THORACIC SURGERY (CARDIOTHORACIC VASCULAR SURGERY)

## 2019-08-21 NOTE — TELEPHONE ENCOUNTER
This patient has been contacted and given information on scheduling Cardiac Rehab.   Wife states they will call when finished with home health.

## 2019-08-21 NOTE — PROGRESS NOTES
Dilip Verma is a 69 y.o. male s/p CABG. He was felt, preoperatively, to be a high risk for sternal complications given his comorbidities and poor functional status.  His postoperative course was relatively uneventful.  Since discharge he denies any signs or symptoms of myocardial ischemia, cerebrovascular event, or infection. He reports good exercise tolerance.    Sternum is stable, incision is clean, dry, and intact.   CTA B/L.   Lower extremity incisions are clean, dry and intact.  GCS 15, no neurologic deficit.    He appears to be doing well.  He remains at high risk for sternal complications with his reliance on a walker, diabetes, renal insufficiency, obstructive sleep apnea and osteoporosis, among other medical issues.  I urged him to continue sternal precautions.      Follow up with us will be in 2 months to confirm adequate healing.

## 2019-08-30 RX ORDER — POTASSIUM CHLORIDE 750 MG/1
40 CAPSULE, EXTENDED RELEASE ORAL DAILY
Qty: 120 CAPSULE | Refills: 3 | Status: SHIPPED | OUTPATIENT
Start: 2019-08-30 | End: 2019-09-17 | Stop reason: SDUPTHER

## 2019-08-30 RX ORDER — FUROSEMIDE 40 MG/1
40 TABLET ORAL DAILY
Qty: 30 TABLET | Refills: 5
Start: 2019-08-30 | End: 2019-09-03 | Stop reason: SDUPTHER

## 2019-09-03 ENCOUNTER — TELEPHONE (OUTPATIENT)
Dept: CARDIOLOGY | Facility: CLINIC | Age: 69
End: 2019-09-03

## 2019-09-03 RX ORDER — FUROSEMIDE 40 MG/1
40 TABLET ORAL DAILY
Qty: 30 TABLET | Refills: 5 | Status: SHIPPED | OUTPATIENT
Start: 2019-09-03 | End: 2019-09-17 | Stop reason: DRUGHIGH

## 2019-09-03 NOTE — TELEPHONE ENCOUNTER
Notified patient's wife of recommendations. She verbalized understanding.    Rosa Maria Shrestha, RN  Triage RN [facility]

## 2019-09-03 NOTE — TELEPHONE ENCOUNTER
Have him increase the Lasix to twice daily for a few days until his weight is back down to baseline then go back to once a day.  Lets to hold off on Lipitor for now

## 2019-09-03 NOTE — TELEPHONE ENCOUNTER
Spoke with patient's wife. The story is that he ran out of his Lasix and has not had a dose since Friday. He had a 4 lb weight gain since Friday. The pharmacy just refilled the dose. Would you still like him to have the double dose or just go back on the regular dose? I also told her to have him hold off on the Lipitor for now. She verbalized understanding.    Rosa Maria Shrestha, RN  Triage RN [facility]

## 2019-09-03 NOTE — TELEPHONE ENCOUNTER
Patient's wife (Beny) called to report patient had a 3.6 weight gain since yesterday.  He is SOA. His BP today is 166/94 at 5:00 am prior to medication.  After medication 150/86.      Patient has never filled his RX for Lipitor.  His last cholesterol lab on 9/14/19 was as follows:     Total Chol. 68, HDL 27, LDL 30, Trig. 57     PCP who ordered the labs informed patient to check with you regarding taking Lipitor 40 mg daily.  Please advise or call patient at [phone].

## 2019-09-10 ENCOUNTER — TELEPHONE (OUTPATIENT)
Dept: CARDIAC REHAB | Facility: HOSPITAL | Age: 69
End: 2019-09-10

## 2019-09-10 ENCOUNTER — APPOINTMENT (OUTPATIENT)
Dept: CARDIAC REHAB | Facility: HOSPITAL | Age: 69
End: 2019-09-10

## 2019-09-10 ENCOUNTER — TELEPHONE (OUTPATIENT)
Dept: CARDIOLOGY | Facility: CLINIC | Age: 69
End: 2019-09-10

## 2019-09-10 NOTE — TELEPHONE ENCOUNTER
"Called pt secondary to needing to reschedule his IA for cardiac rehab phase II.  Spoke to his wife.  She relates that her  has been weak and s/w \"bleary-eyed\" and was counting on today's appt to have him checked out.  Wife says pt had lab work at primary care yesterday, but no HCP has been told about the patient being weak.  Wife says home health d/c'd him last Friday and he was not like this then.  Advised to call his PCP office today and let them know what is going on and also tell them he had lab work done there yesterday.  Recommended if she can't get through to PCP office to call his cardiologist's office.  Pt has been rescheduled for Sept 16 at 1:30 pm.    "

## 2019-09-10 NOTE — TELEPHONE ENCOUNTER
"09/10/19  9:36 AM  Dilip Verma  1950  Home Phone [phone]     Dr. Hylton,    Mr. Verma' wife called this morning. She stated that he hasn't \"been right\" the last few days. She said he's had trouble sleeping with the Bipap and he continues to stop breathing at night. She said, \"it's like his eyes are glazed over and he's unsteady on his feet, as well\". She did not have a B/P or HR to give me.    She said his feet aren't swollen anymore, but he is still consistently gaining weight:  9/5: 184.6  9/6: 187.2  9/7:188.4  9/8: 190  9/9:191.6    A few weeks ago we spoke to her about this. He had been off his Lasix dose for 4 days. This time, he has not been off any medications:    Lasix 40 mg daily  Potassium 40 mEq daily  Aspirin 325 daily  Atorvastatin 40 mg q hs  Hydralazine 25 mg tid  Metoprolol 25 mg q 12 hours    His next appt is with Danii on 11-. Would you like to see him sooner, get labs or make any medication changes?    Rosa Maria Shrestha, RN  Triage RN [facility]            "

## 2019-09-11 ENCOUNTER — TELEPHONE (OUTPATIENT)
Dept: CARDIOLOGY | Facility: CLINIC | Age: 69
End: 2019-09-11

## 2019-09-11 ENCOUNTER — OFFICE VISIT (OUTPATIENT)
Dept: CARDIOLOGY | Facility: CLINIC | Age: 69
End: 2019-09-11

## 2019-09-11 ENCOUNTER — HOSPITAL ENCOUNTER (OUTPATIENT)
Dept: CARDIOLOGY | Facility: HOSPITAL | Age: 69
Discharge: HOME | End: 2019-09-11
Admitting: NURSE PRACTITIONER

## 2019-09-11 VITALS
BODY MASS INDEX: 28.44 KG/M2 | SYSTOLIC BLOOD PRESSURE: 128 MMHG | HEART RATE: 59 BPM | WEIGHT: 192 LBS | DIASTOLIC BLOOD PRESSURE: 78 MMHG | HEIGHT: 69 IN

## 2019-09-11 DIAGNOSIS — I10 ESSENTIAL HYPERTENSION: ICD-10-CM

## 2019-09-11 DIAGNOSIS — E78.2 MIXED HYPERLIPIDEMIA: ICD-10-CM

## 2019-09-11 DIAGNOSIS — R06.02 SHORTNESS OF BREATH: ICD-10-CM

## 2019-09-11 DIAGNOSIS — I50.32 CHRONIC DIASTOLIC CHF (CONGESTIVE HEART FAILURE) (HCC): Primary | ICD-10-CM

## 2019-09-11 DIAGNOSIS — G47.33 OBSTRUCTIVE SLEEP APNEA SYNDROME: ICD-10-CM

## 2019-09-11 DIAGNOSIS — I50.32 CHRONIC DIASTOLIC CHF (CONGESTIVE HEART FAILURE) (HCC): ICD-10-CM

## 2019-09-11 DIAGNOSIS — I25.10 CORONARY ARTERY DISEASE INVOLVING NATIVE CORONARY ARTERY OF NATIVE HEART WITHOUT ANGINA PECTORIS: ICD-10-CM

## 2019-09-11 LAB
ALBUMIN SERPL-MCNC: 4.4 G/DL (ref 3.5–5.2)
ALBUMIN/GLOB SERPL: 1.4 G/DL
ALP SERPL-CCNC: 141 U/L (ref 39–117)
ALT SERPL W P-5'-P-CCNC: 91 U/L (ref 1–41)
ANION GAP SERPL CALCULATED.3IONS-SCNC: 11.2 MMOL/L (ref 5–15)
AST SERPL-CCNC: 54 U/L (ref 1–40)
BILIRUB SERPL-MCNC: 0.7 MG/DL (ref 0.2–1.2)
BUN BLD-MCNC: 27 MG/DL (ref 8–23)
BUN/CREAT SERPL: 24.3 (ref 7–25)
CALCIUM SPEC-SCNC: 9.5 MG/DL (ref 8.6–10.5)
CHLORIDE SERPL-SCNC: 103 MMOL/L (ref 98–107)
CO2 SERPL-SCNC: 26.8 MMOL/L (ref 22–29)
CREAT BLD-MCNC: 1.11 MG/DL (ref 0.76–1.27)
GFR SERPL CREATININE-BSD FRML MDRD: 65 ML/MIN/1.73
GLOBULIN UR ELPH-MCNC: 3.1 GM/DL
GLUCOSE BLD-MCNC: 189 MG/DL (ref 65–99)
NT-PROBNP SERPL-MCNC: 3282 PG/ML (ref 5–900)
POTASSIUM BLD-SCNC: 4.9 MMOL/L (ref 3.5–5.2)
PROT SERPL-MCNC: 7.5 G/DL (ref 6–8.5)
SODIUM BLD-SCNC: 141 MMOL/L (ref 136–145)

## 2019-09-11 PROCEDURE — 93000 ELECTROCARDIOGRAM COMPLETE: CPT | Performed by: NURSE PRACTITIONER

## 2019-09-11 PROCEDURE — 99214 OFFICE O/P EST MOD 30 MIN: CPT | Performed by: NURSE PRACTITIONER

## 2019-09-11 PROCEDURE — 36415 COLL VENOUS BLD VENIPUNCTURE: CPT

## 2019-09-11 PROCEDURE — 80053 COMPREHEN METABOLIC PANEL: CPT | Performed by: NURSE PRACTITIONER

## 2019-09-11 PROCEDURE — 83880 ASSAY OF NATRIURETIC PEPTIDE: CPT | Performed by: NURSE PRACTITIONER

## 2019-09-11 NOTE — PROGRESS NOTES
Please let patient know that his fluid level test was elevated today, though not as high as it was a month ago.  Would go ahead and take the 1 extra dosage of Lasix and potassium today as previously instructed in the office.  Would also have him take 1 additional dosage of Lasix and potassium tomorrow.  Then he is to resume normal Lasix and potassium dosages (once daily).  Call with updated weights and symptoms on Friday or call sooner for any new or worsening symptoms before that time.

## 2019-09-11 NOTE — TELEPHONE ENCOUNTER
Wife called stating his blood pressure was 92/47 HR 60's upon arrival home from office visit. Follow up BP was 133/76 HR 62 most recently. He appears asymptomatic. She voiced concerns regarding hydralazine/lasix dosing. Explained to her she could still give doses if SBP>90/100 and could space dosing apart if blood pressure borderline (for example, take lasix/hydralazine an hour apart to reduce risk of low BP).     She also states he is no longer taking Lipitor. Instructed her to have him follow up with PCP in upcoming weeks regarding concern over elevated liver enzymes noted on blood work completed today.

## 2019-09-11 NOTE — PROGRESS NOTES
Date of Office Visit: 2019  Encounter Provider: Mary Miguel, RE, APRN  Place of Service: [facility] CARDIOLOGY  Patient Name: Dilip Verma  :1950        Subjective:     Chief Complaint:  Follow-up, chronic diastolic heart failure, coronary artery disease, hypertension.      History of Present Illness:  Dilip Verma is a 69 y.o. male patient of Dr. Hylton.  This is my first time seeing this patient in the office today and I reviewed his records.    Patient has a history of coronary artery disease status post CABG, hypertension, chronic diastolic heart failure, hyperlipidemia, obstructive sleep apnea, diabetes, syncope.    Patient was hospitalized 2019 at [facility] for syncope.  He was orthostatic with supine hypertension.  Clonidine was discontinued and hydralazine was increased.  He presented to [facility] 2019 with uncontrolled blood pressure and blurry vision.  He had abnormal BNP and troponin.  Echocardiogram 2019 showed EF of 53%, moderate left ventricular hypertrophy, mild to moderate left atrial enlargement, no significant valvular disease.  Holter monitor 2019 showed nonsustained atrial tachycardia.  Because of the elevated troponin he underwent heart catheterization 7/15/2019 which showed normal left ventricular ejection fraction with severe multivessel coronary disease.  Carotid Doppler showed mild bilateral carotid artery stenosis.  Transthoracic echo showed normal LV function with EF of 55%, grade 3 diastolic dysfunction, no significant valvular disease, though severe pulmonary hypertension was noted.  Left ventricular end-diastolic pressure on heart catheterization was 18.  2019 he underwent bypass surgery with left internal mammary artery to left anterior descending, vein graft to the obtuse marginal 1, vein graft to the distal right coronary artery.  He has some postoperative bradycardia and beta-blocker was held.  He had a brief  postoperative atrial fibrillation and was discharged on amiodarone.    Patient's wife called the office 9/10/2019 reporting that patient was no longer having feet swelling but weight continued to increase.  He was instructed to be seen by LEIDY or Dr. Hylton.      Patient presents to office today for follow-up appointment.  Patient's wife is with him in the office today, per patient preference.  Patient reports he has been having some issues since last visit.  He has been having a lot of anxiety at night and having night terrors.  He is not sleeping well.  He does report that he has had history of anxiety and night terrors in the past as well and has a history of PTSD.  He is currently looking for a psychiatrist to administer ketamine.  He has been on BiPAP machine for years.  However more recently he is having a really hard time tolerating the BiPAP machine.  He feels that it is choking him.  He has a follow-up appointment with sleep medicine tomorrow which he will keep.  Advised that he follow-up with psychiatrist or whoever is managing his mood for further help with the PTSD and night terrors.  Patient reports some chronic shortness of breath with exertion though he feels that this has been increasing over the last week.  He does attribute some of this to when he is trying to use his BiPAP machine.  He does feel like his edema has improved however his weight has been steadily increasing.  Weight 9/5 184.6, 9/6 187.2, 9/7 188.4, 9/8 190, 9/9 191.6.  They have not taken any additional Lasix dosages as they were just told to take additional Lasix if he gains more than 2 pounds in 1 day.  I asked him to continue with taking an extra Lasix and potassium dosage if he gains more than 2 pounds in 1 day but also if he gains more than 5 pounds in 1 week.  They will go ahead and take an extra Lasix and potassium dose today.  They will continue to monitor daily weight first thing in the morning before eating or drinking  "anything and after emptying bladder and keep a log.  They will call with update in readings and symptoms on Friday.  Patient denies any chest pain, palpitations, racing heartbeat sensation, syncope, near syncope, or abnormal bleeding.  He does have a history of some falls which he attributes to vision issues from diabetic retinopathy as well as history of spinal canal stenosis leading to neuropathy in his feet.  Ambulates with a walker.  He is to follow-up with outside providers on these issues.  Blood pressure and heart rate are well controlled in the office today.  They are running about the same outside the office as well though he has had a couple of lower blood pressure readings with systolic 100-110.  They will continue to monitor and call for high or low readings.  He did have recent labs done through his primary care provider showing very mildly elevated AST at 44 as well as LDL cholesterol of 30 on the atorvastatin.  We will recheck CMP today when we recheck his proBNP as well.  If AST remains elevated we will look at decreasing his atorvastatin dosage with careful monitoring of liver enzymes and cholesterol.          Past Medical History:   Diagnosis Date   • Acute diastolic (congestive) heart failure (CMS/HCC)    • Anxiety    • Chest pain    • Chronic kidney disease     stones- had lithotripsy   • Colon polyp    • Coronary artery disease     CABG 7/2019   • Diabetes mellitus, type II (CMS/HCC)    • Erectile dysfunction    • H/O bone density study never   • Hyperlipidemia    • Hypersomnia    • Hypertension    • NSTEMI (non-ST elevated myocardial infarction) (CMS/HCC)    • Orthostasis    • Periodic breathing    • Routine eye exam 2015   • Sleep apnea     bipap   • Stroke (CMS/HCC)     \"slight stroke\"     Past Surgical History:   Procedure Laterality Date   • CARDIAC CATHETERIZATION N/A 7/15/2019    Procedure: Coronary angiography;  Surgeon: Carrie Price MD;  Location: [facility] CATH INVASIVE LOCATION;  " Service: Cardiovascular   • CARDIAC CATHETERIZATION N/A 7/15/2019    Procedure: Left Heart Cath;  Surgeon: Carrie Price MD;  Location:  [facility] CATH INVASIVE LOCATION;  Service: Cardiovascular   • CARDIAC CATHETERIZATION N/A 7/15/2019    Procedure: Left ventriculography;  Surgeon: Carrie Price MD;  Location:  [facility] CATH INVASIVE LOCATION;  Service: Cardiovascular   • CARDIAC CATHETERIZATION  7/15/2019    Procedure: Functional Flow [place];  Surgeon: Carrie Price MD;  Location:  [facility] CATH INVASIVE LOCATION;  Service: Cardiovascular   • CATARACT EXTRACTION EXTRACAPSULAR W/ INTRAOCULAR LENS IMPLANTATION Bilateral    • COLONOSCOPY  01/2015    dr [email or web address]   • CORONARY ARTERY BYPASS GRAFT N/A 7/18/2019    Procedure: INTRAOPERATIVE SHOAIB; STERNOTOMY CORONARY ARTERY BYPASS x 3  USING LEFT INTERNAL MAMMARY ARTERY GRAFT UTILIZING ENDOSCOPICALLY HARVESTED RIGHT GREATER SAPHENOUS VEIN AND PRP.;  Surgeon: Bill Devi MD;  Location: [facility] OR;  Service: Cardiothoracic   • DENTAL PROCEDURE      3 surgeries inder implants   • HERNIA REPAIR      inguinal bilaterally   • KIDNEY STONE SURGERY      lithotripsy     Outpatient Medications Prior to Visit   Medication Sig Dispense Refill   • aspirin  MG EC tablet Take 1 tablet by mouth Daily. 30 tablet    • Cholecalciferol (VITAMIN D PO) Take 1 tablet by mouth Daily.     • furosemide (LASIX) 40 MG tablet Take 1 tablet by mouth Daily. 30 tablet 5   • gabapentin (NEURONTIN) 300 MG capsule Take 300 mg by mouth As Needed.  0   • hydrALAZINE (APRESOLINE) 25 MG tablet Take 1 tablet by mouth 3 (Three) Times a Day.     • insulin degludec (TRESIBA FLEXTOUCH) 100 UNIT/ML solution pen-injector injection Inject 21 Units under the skin into the appropriate area as directed Daily With Dinner.     • insulin lispro (HUMALOG) 100 UNIT/ML injection Inject  under the skin into the appropriate area as directed 3 (Three) Times a Day Before Meals. PER SS -  Currently taking 3 units at noon  and 5 units in pm     • melatonin 3 MG tablet Take 10 mg by mouth Every Night.     • metoprolol tartrate (LOPRESSOR) 25 MG tablet Take 1 tablet by mouth Every 12 (Twelve) Hours.     • potassium chloride (MICRO-K) 10 MEQ CR capsule Take 4 capsules by mouth Daily. 120 capsule 3   • tamsulosin (FLOMAX) 0.4 MG capsule 24 hr capsule Take 1 capsule by mouth 2 (Two) Times a Day. Wife states he takes it when he has trouble voiding, Also taking for high blood pressure.     • atorvastatin (LIPITOR) 40 MG tablet Take 1 tablet by mouth Every Night.     • Testosterone 20.25 MG/1.25GM (1.62%) gel Place 1 packet on the skin as directed by provider Daily.       No facility-administered medications prior to visit.        Allergies as of 09/11/2019 - Reviewed 09/11/2019   Allergen Reaction Noted   • Ativan [lorazepam] Irritability 07/15/2019   • Ace inhibitors Angioedema 10/27/2015   • Adhesive tape Other (See Comments) 08/19/2019   • Losartan Angioedema 03/01/2016   • Sulfa antibiotics Other (See Comments) 10/27/2015   • Tetracycline Other (See Comments) 09/19/2014     Social History     Socioeconomic History   • Marital status:      Spouse name: Not on file   • Number of children: Not on file   • Years of education: Not on file   • Highest education level: Not on file   Tobacco Use   • Smoking status: Former Smoker     Packs/day: 0.75   • Smokeless tobacco: Never Used   • Tobacco comment: Start age:39/Stopping age:47, 3/4 PPD   Substance and Sexual Activity   • Alcohol use: No   • Drug use: No   • Sexual activity: Not Currently     Family History   Problem Relation Age of Onset   • Depression Mother    • Cancer Mother         uterine   • Heart disease Father    • Hypertension Father    • Kidney disease Father    • Thyroid disease Father    • Depression Sister    • Alcohol abuse Brother    • Alcohol abuse Other    • Alcohol abuse Other    • Lung disease Other    • Thyroid disease Other    • Glaucoma Other    • Heart attack  "Other        Review of Systems   Constitution: Positive for malaise/fatigue.   HENT: Positive for congestion.    Eyes: Positive for vision loss in left eye and vision loss in right eye.        History of diabetic retinopathy.  Followed by ophthalmology.  Patient has upcoming follow-up scheduled.   Cardiovascular: Negative for chest pain, leg swelling, near-syncope, palpitations and syncope.   Respiratory: Positive for shortness of breath.    Hematologic/Lymphatic: Negative for bleeding problem.   Musculoskeletal: Positive for falls.   Gastrointestinal: Negative for jaundice and melena.   Genitourinary: Negative for hematuria.   Neurological: Positive for numbness (feet; chronic).   Psychiatric/Behavioral: Negative for altered mental status.          Objective:     Vitals:    09/11/19 1411   BP: 128/78   BP Location: Left arm   Pulse: 59   Weight: 87.1 kg (192 lb)   Height: 175.3 cm (69\")     Body mass index is 28.35 kg/m².      PHYSICAL EXAM:  Physical Exam   Constitutional: He is oriented to person, place, and time. He appears well-developed and well-nourished. No distress.   HENT:   Head: Normocephalic and atraumatic.   Eyes: Pupils are equal, round, and reactive to light.   Neck: Neck supple. No JVD present. Carotid bruit is not present.   Cardiovascular: Normal rate, regular rhythm, normal heart sounds and intact distal pulses. Exam reveals no gallop and no friction rub.   No murmur heard.  Pulses:       Radial pulses are 2+ on the right side, and 2+ on the left side.        Posterior tibial pulses are 2+ on the right side, and 2+ on the left side.   Pulmonary/Chest: Effort normal and breath sounds normal. No respiratory distress. He has no wheezes. He has no rales.   Abdominal: Soft. Bowel sounds are normal. He exhibits no distension. There is no tenderness.   Musculoskeletal: He exhibits no edema or tenderness.   Neurological: He is alert and oriented to person, place, and time.   Skin: Skin is warm and dry. " He is not diaphoretic. No erythema.   Psychiatric: He has a normal mood and affect.           ECG 12 Lead  Date/Time: 9/11/2019 2:14 PM  Performed by: Mary Miguel DNP, APRN  Authorized by: Mary Miguel DNP, APRN   Comparison: compared with previous ECG from 8/19/2019  Rhythm: sinus rhythm  Rate: normal  BPM: 59  Conduction: incomplete right bundle branch block  Other findings: non-specific ST-T wave changes  Other findings comments: T wave inversion, similar to previous ECG.  Comments: No significant changes from previous ECG.              Assessment:       Diagnosis Plan   1. Chronic diastolic CHF (congestive heart failure) (CMS/[facility])  ECG 12 Lead    proBNP    Comprehensive Metabolic Panel   2. Coronary artery disease involving native coronary artery of native heart without angina pectoris  ECG 12 Lead   3. Essential hypertension  Comprehensive Metabolic Panel   4. Obstructive sleep apnea syndrome     5. Mixed hyperlipidemia  Comprehensive Metabolic Panel   6. Shortness of breath  proBNP         Plan:     1. Chronic diastolic heart failure: Patient's lungs are clear and he does not have edema on exam.  He reports his edema has improved.  However he has had some increasing shortness of breath over the last week though most of the sounds like it was related to trying to use his BiPAP and he thinks it is related to nasal congestion with Afrin usage.  He is going to keep appointment with sleep medicine and follow-up with primary care provider for alternatives to Afrin as this medication can be habit-forming.  We will go ahead and have him take an extra dose of Lasix and potassium today as his weight has been increasing.  We will check proBNP.  Patient to continue to monitor daily weights and call with update on Friday.  He will call sooner for any new or worsening symptoms.  Him and his wife eat quite a bit of fast food and restaurant food and we discussed the high salt intake in these foods.  Recommended  that they try to cook their own food and not add salt.  Handouts provided on watching salt intake.  2. Hypertension: Blood pressure well controlled in the office today.  Patient to continue to monitor.  3. Coronary artery disease: Status post CABG 7/18/2019.  Patient denies chest pain or anginal symptoms.  He is starting cardiac rehab 9/16/2019.  Highly encourage participation in cardiac rehab.  4. Brief postop atrial fibrillation  5. Hyperlipidemia: Managed by outside provider.  6. Mild carotid artery stenosis bilaterally  7. Diabetes: Managed by outside provider.  8. Obstructive sleep apnea: On BiPAP therapy.  Currently having trouble tolerating this.  Has follow-up with Dr. Cai tomorrow which they were encouraged to keep.  9. History of anxiety, PTSD, and night terrors: Patient instructed to follow-up with managing provider on this.  Highly recommended further evaluation and treatment.  Patient has struggled with this for years, per patient and wife.    Patient to keep 11/22/2019 follow-up appointment with LEIDY Mejía as scheduled in 8/21/2020 follow-up with Dr. Hylton as scheduled or follow-up sooner if needed for any new, recurrent, or worsening symptoms or other problems/concerns.           Your medication list           Accurate as of 9/11/19  3:43 PM. If you have any questions, ask your nurse or doctor.               CONTINUE taking these medications      Instructions Last Dose Given Next Dose Due   aspirin 325 MG EC tablet      Take 1 tablet by mouth Daily.       furosemide 40 MG tablet  Commonly known as:  LASIX      Take 1 tablet by mouth Daily.       gabapentin 300 MG capsule  Commonly known as:  NEURONTIN      Take 300 mg by mouth As Needed.       HUMALOG 100 UNIT/ML injection  Generic drug:  insulin lispro      Inject  under the skin into the appropriate area as directed 3 (Three) Times a Day Before Meals. PER SS -  Currently taking 3 units at noon and 5 units in pm       hydrALAZINE 25 MG  tablet  Commonly known as:  APRESOLINE      Take 1 tablet by mouth 3 (Three) Times a Day.       melatonin 3 MG tablet      Take 10 mg by mouth Every Night.       metoprolol tartrate 25 MG tablet  Commonly known as:  LOPRESSOR      Take 1 tablet by mouth Every 12 (Twelve) Hours.       potassium chloride 10 MEQ CR capsule  Commonly known as:  MICRO-K      Take 4 capsules by mouth Daily.       tamsulosin 0.4 MG capsule 24 hr capsule  Commonly known as:  FLOMAX      Take 1 capsule by mouth 2 (Two) Times a Day. Wife states he takes it when he has trouble voiding, Also taking for high blood pressure.       TRESIBA FLEXTOUCH 100 UNIT/ML solution pen-injector injection  Generic drug:  insulin degludec      Inject 21 Units under the skin into the appropriate area as directed Daily With Dinner.       VITAMIN D PO      Take 1 tablet by mouth Daily.              I did not stop or change the above medications.  Patient's medication list was updated to reflect medications they are currently taking including medication changes made by other providers.        Thanks,    Mary Miguel, RE, APRN  09/11/2019         Dictated utilizing Dragon dictation

## 2019-09-11 NOTE — PROGRESS NOTES
Please call patient and find out whether he is still taking atorvastatin.  It was previously listed on his medication list but today during office visit it was not listed as still being active.  Patient's liver enzymes are elevated today on CMP.  If he is still taking atorvastatin then I would have him stop this for now and follow-up with his primary care provider to have liver enzymes rechecked within the next couple of weeks.  If he was already instructed to stop the atorvastatin then he still needs to go ahead and follow-up with his primary care provider in a couple of weeks to have liver enzymes checked.  He needs to call to schedule follow-up with PCP if he does not already have one scheduled within the next few weeks.  His blood sugar was elevated on labs today though he does have history of diabetes.  Would have him continue to monitor this at home and follow-up with managing provider.

## 2019-09-12 ENCOUNTER — OFFICE VISIT (OUTPATIENT)
Dept: SLEEP MEDICINE | Facility: HOSPITAL | Age: 69
End: 2019-09-12
Attending: INTERNAL MEDICINE

## 2019-09-12 VITALS — BODY MASS INDEX: 28.58 KG/M2 | HEIGHT: 69 IN | WEIGHT: 193 LBS

## 2019-09-12 DIAGNOSIS — G47.31 CSA (CENTRAL SLEEP APNEA): ICD-10-CM

## 2019-09-12 DIAGNOSIS — G47.33 OBSTRUCTIVE SLEEP APNEA SYNDROME: Primary | ICD-10-CM

## 2019-09-12 DIAGNOSIS — G47.14 HYPERSOMNIA DUE TO MEDICAL CONDITION: ICD-10-CM

## 2019-09-12 DIAGNOSIS — R06.3 PERIODIC BREATHING: ICD-10-CM

## 2019-09-12 PROCEDURE — G0463 HOSPITAL OUTPT CLINIC VISIT: HCPCS

## 2019-09-12 PROCEDURE — 99213 OFFICE O/P EST LOW 20 MIN: CPT | Performed by: INTERNAL MEDICINE

## 2019-09-12 NOTE — PROGRESS NOTES
"Follow Up [facility] Note     Chief Complaint:  COCO/CSA     Primary Care Physician: Jaquan Crockett Jr., APRN    Interval History:   I last saw the patient in January.  Patient had triple bypass surgery July 18, 2019.  The patient states he has difficulty using his BiPAP.  He is fatigued restless.  He has nightmares.  He states he does not breathe well.  He has worsening complaints of neuropathy.  Patient goes to bed at midnight and awakens at 7:30 AM.  [place] Sleepiness Scale is abnormal at 16.  The patient is taking melatonin.    Review of Systems:    A complete review of systems was done and all were negative with the exception of nasal congestion and postnasal drip, MARTIN, chills, ABBY and abdominal bloating, and anxiety    Social History:    Social History     Socioeconomic History   • Marital status:      Spouse name: Not on file   • Number of children: Not on file   • Years of education: Not on file   • Highest education level: Not on file   Tobacco Use   • Smoking status: Former Smoker     Packs/day: 0.75   • Smokeless tobacco: Never Used   • Tobacco comment: Start age:39/Stopping age:47, 3/4 PPD   Substance and Sexual Activity   • Alcohol use: No   • Drug use: No   • Sexual activity: Not Currently       Allergies:  Ativan [lorazepam]; Ace inhibitors; Adhesive tape; Losartan; Sulfa antibiotics; and Tetracycline     Medication Review: His list was reviewed.      Vital Signs:    Vitals:    09/12/19 1155   Weight: 87.5 kg (193 lb)   Height: 175.3 cm (69\")     Body mass index is 28.5 kg/m².    Physical Exam:    Constitutional:  Well developed 69 y.o. male that appears in no apparent distress.  Awake & oriented times 3.  Normal mood with normal recent and remote memory and normal judgement.  Eyes:  Conjunctivae normal.  Oropharynx:  moist mucous membranes without exudate and a large tongue and normal uvula and moderate narrowing of the posterior pharyngeal opening and class II MP " airway     Results Review:  DME is [employer] and he uses a nasal interface.  Downloads between 6/14 and 9/11/2019 compliance 90%.  Average usage is 4 hours and 51 minutes.  Average AHI is abnormal at 29 and his CA index is 12 and his OA index was 11.6.  No significant leak.  Average AutoBiPAP pressure is IPAP of 16.3 and EPAP of 14 and his auto BiPAP settings: Max IPAP 18 minimum EPAP 10 and maximum pressure support 6 and minimum pressure support of 2.       Impression:   Obstructive sleep apnea as well as central sleep apnea not adequately treated with auto BiPAP with good compliance and usage and persistent complaints of hypersomnolence.    Plan:  Good sleep hygiene measures should be maintained.  Weight loss would be beneficial in this patient who is obese by BMI.  The patient is benefiting from the treatment being provided.     I reviewed all with this patient and his wife.  The above was reviewed.  Periodic breathing appears to be better?  At follow-up, if the patient is no better, BiPAP auto SV titration will be required.    The patient will call for any problems and will follow up in 3 months.      Morris Cai MD  Sleep Medicine  09/12/19  12:13 PM

## 2019-09-13 ENCOUNTER — TELEPHONE (OUTPATIENT)
Dept: CARDIOLOGY | Facility: CLINIC | Age: 69
End: 2019-09-13

## 2019-09-13 NOTE — TELEPHONE ENCOUNTER
9/13/19  Wife called again at 2:15 to report bp taken at 1:00 was 173/99 - she is considering going back up to the whole tablet since bp have continued to be elevated.  Ph [phone]/demetrius

## 2019-09-13 NOTE — TELEPHONE ENCOUNTER
9/13/19  Patient's wife called to report bp readings since the hydralazine dose was cut in half.      Last night @10:30pm  141/75    Took meds at 8:30 this morning:  Today at 6:40 am  158/94 and again at 10:30  161/87  And at 11:00  160/97.  Weight is 190#, down 1.2#.  PCP has not returned their call yet.

## 2019-09-13 NOTE — TELEPHONE ENCOUNTER
That is fine, they can go back to the whole tablet of hydralazine 3 times a day.  Have them call for systolic blood pressure readings less than 100 or if it staying greater than 130/80.

## 2019-09-13 NOTE — TELEPHONE ENCOUNTER
9/13/19  [place] [facility] [place]demetrius

## 2019-09-14 PROBLEM — G47.14 HYPERSOMNIA DUE TO MEDICAL CONDITION: Status: ACTIVE | Noted: 2019-09-14

## 2019-09-14 PROBLEM — R06.3 PERIODIC BREATHING: Status: ACTIVE | Noted: 2019-09-14

## 2019-09-14 PROBLEM — G47.31 CSA (CENTRAL SLEEP APNEA): Status: ACTIVE | Noted: 2019-09-14

## 2019-09-14 NOTE — TELEPHONE ENCOUNTER
Patient needs to schedule BP check appointment to be done this week.  Please call to arrange.  Please remind them to BRING CUFF to the appointment so it can be checked for accuracy.

## 2019-09-14 NOTE — TELEPHONE ENCOUNTER
I am the on call provider tonight with GÉNESIS.    Patient and wife called answering service reporting patient took full dosage hydralazine in the afternoon and BP now is 168/96.  Patient is due for evening hydralazine dose but has not taken it yet.  Patient to go ahead and take evening 25mg hydralazine.  He is not having any acute symptoms or any chest discomfort.    Patient to continue with the 25mg hydralazine TID and they will call office Monday with updated readings and to schedule BP check appointment to see if home cuff is accurate.     Will call answering service over the weekend if they have additional concerns or go to ER if patient has high BP associated with any chest pain, vision changes, headache, stroke-like symptoms, etc.

## 2019-09-15 ENCOUNTER — TELEPHONE (OUTPATIENT)
Dept: CARDIOLOGY | Facility: CLINIC | Age: 69
End: 2019-09-15

## 2019-09-16 ENCOUNTER — OFFICE VISIT (OUTPATIENT)
Dept: CARDIAC REHAB | Facility: HOSPITAL | Age: 69
End: 2019-09-16

## 2019-09-16 DIAGNOSIS — Z95.1 HX OF CABG: ICD-10-CM

## 2019-09-16 DIAGNOSIS — I25.10 CORONARY ARTERY DISEASE INVOLVING NATIVE CORONARY ARTERY OF NATIVE HEART WITHOUT ANGINA PECTORIS: ICD-10-CM

## 2019-09-16 DIAGNOSIS — I21.4 NSTEMI (NON-ST ELEVATED MYOCARDIAL INFARCTION) (HCC): ICD-10-CM

## 2019-09-16 NOTE — TELEPHONE ENCOUNTER
"Call from service regarding \"elevated BP\"    Spoke with PT's wife BP This am 114/65.  Went to [facility] and did not take medication BB or hydralazine.   After [facility] BP was 110/63.  HE was told to hold BP meds if  or less so he did not take his medication.  Currently BP up to 184/105 with HR 80 now.  He has had a dose of flomax at 1400.  He is on hydralazine 25 mg TID and metoprolol tartrate 25 mg BID, lasix 40 mg daily and flomax BID.      He has not had afternoon hydralazine or evening dose or any BB today.    Instructed wife to give metoprolol 25 mg and hydralazine 25 mg po now.  Recheck BP 1-2 hours.  Then take hydralazine 25 mg which is scheduled before bed  Tonight.  If SBP remains above 160 and HR > 60 he is to take an additional 25 mg of metoprolol and get back on schedule in am.      HE is to start cardiac rehab tomorrow at one.  Instructed them to call DR. Hylton office in am for further adjustments.  If they have any further concerns instructed to call office back for assistance.      LEIDY Arndt  Cardiology Service.   "

## 2019-09-17 ENCOUNTER — TELEPHONE (OUTPATIENT)
Dept: CARDIOLOGY | Facility: CLINIC | Age: 69
End: 2019-09-17

## 2019-09-17 RX ORDER — POTASSIUM CHLORIDE 20 MEQ/1
40 TABLET, EXTENDED RELEASE ORAL 2 TIMES DAILY
Qty: 120 TABLET | Refills: 11 | Status: SHIPPED | OUTPATIENT
Start: 2019-09-17 | End: 2019-11-22 | Stop reason: ALTCHOICE

## 2019-09-17 RX ORDER — FUROSEMIDE 40 MG/1
40 TABLET ORAL 2 TIMES DAILY
Qty: 60 TABLET | Refills: 11 | Status: SHIPPED | OUTPATIENT
Start: 2019-09-17 | End: 2020-12-07 | Stop reason: HOSPADM

## 2019-09-17 NOTE — TELEPHONE ENCOUNTER
Placed orders for new medication doses. Notified patient's wife. She verbalized understanding.    Rosa Maria Shrestha, RN  Triage RN [facility]OM[facility]

## 2019-09-17 NOTE — TELEPHONE ENCOUNTER
"09/17/19  11:24 AM  Dilip Verma  1950  Home Phone [phone]     Beny Ramirez called about Dilip again today.     They called about his weight gain on 9-10-19. You asked him to come in and see you or Rosa Maria. He came in for an appointment with KATHY on 9-15-19. Since then, his weight has been going back up every day. Two days ago his weight was 187. Yesterday it was up to 192.2.     So, they spoke to the on-call doctor last night. She advised to take an extra Lasix 40 mg. He took that and three hours later he weighed himself. It was 190.8.    His B/P was 170/90 after taking his medications this morning. His wife states he hasn't been able to sleep in 3 days. He told her he is so anxious he just wants to \"crawl out of his skin\".    Meds reviewed:  Furosemide 40 mg daily  Potassium 10 mEq 4 capsules daily  Aspirin 325 mg daiy  Hydralazine 25 mg tid  Lopressor 25 mg q 12 hours    Do you have any recommendations for him today?    Rosa Maria Shrestha, RN  Triage RN [facility]                "

## 2019-09-17 NOTE — TELEPHONE ENCOUNTER
Dr. Hylton,    May I change his potassium order to 20 mEq 2 tablets BID? Currently he would have to take 8 10 mEq capsules if he does the 10 mEq dose. His wife is asking.    Rosa Maria Shrestha, RN  Triage RN MG

## 2019-09-18 ENCOUNTER — OFFICE VISIT (OUTPATIENT)
Dept: FAMILY MEDICINE CLINIC | Facility: CLINIC | Age: 69
End: 2019-09-18

## 2019-09-18 ENCOUNTER — TELEPHONE (OUTPATIENT)
Dept: FAMILY MEDICINE CLINIC | Facility: CLINIC | Age: 69
End: 2019-09-18

## 2019-09-18 VITALS
WEIGHT: 188 LBS | HEIGHT: 69 IN | DIASTOLIC BLOOD PRESSURE: 62 MMHG | OXYGEN SATURATION: 95 % | TEMPERATURE: 97.7 F | BODY MASS INDEX: 27.85 KG/M2 | SYSTOLIC BLOOD PRESSURE: 134 MMHG | HEART RATE: 64 BPM

## 2019-09-18 DIAGNOSIS — Z79.4 TYPE 2 DIABETES MELLITUS WITH STAGE 3 CHRONIC KIDNEY DISEASE, WITH LONG-TERM CURRENT USE OF INSULIN (HCC): ICD-10-CM

## 2019-09-18 DIAGNOSIS — F43.10 PTSD (POST-TRAUMATIC STRESS DISORDER): ICD-10-CM

## 2019-09-18 DIAGNOSIS — E11.22 TYPE 2 DIABETES MELLITUS WITH STAGE 3 CHRONIC KIDNEY DISEASE, WITH LONG-TERM CURRENT USE OF INSULIN (HCC): ICD-10-CM

## 2019-09-18 DIAGNOSIS — I50.32 CHRONIC DIASTOLIC CHF (CONGESTIVE HEART FAILURE) (HCC): ICD-10-CM

## 2019-09-18 DIAGNOSIS — E78.2 MIXED HYPERLIPIDEMIA: ICD-10-CM

## 2019-09-18 DIAGNOSIS — E11.42 DIABETIC POLYNEUROPATHY ASSOCIATED WITH TYPE 2 DIABETES MELLITUS (HCC): ICD-10-CM

## 2019-09-18 DIAGNOSIS — I25.10 CORONARY ARTERY DISEASE INVOLVING NATIVE CORONARY ARTERY OF NATIVE HEART WITHOUT ANGINA PECTORIS: ICD-10-CM

## 2019-09-18 DIAGNOSIS — N18.30 TYPE 2 DIABETES MELLITUS WITH STAGE 3 CHRONIC KIDNEY DISEASE, WITH LONG-TERM CURRENT USE OF INSULIN (HCC): ICD-10-CM

## 2019-09-18 DIAGNOSIS — I10 ESSENTIAL HYPERTENSION: ICD-10-CM

## 2019-09-18 DIAGNOSIS — D64.9 LOW HEMOGLOBIN: ICD-10-CM

## 2019-09-18 DIAGNOSIS — R74.8 ELEVATED LIVER ENZYMES: Primary | ICD-10-CM

## 2019-09-18 DIAGNOSIS — Z79.899 LONG-TERM USE OF HIGH-RISK MEDICATION: ICD-10-CM

## 2019-09-18 DIAGNOSIS — K63.5 POLYP OF COLON, UNSPECIFIED PART OF COLON, UNSPECIFIED TYPE: ICD-10-CM

## 2019-09-18 LAB
ALBUMIN SERPL-MCNC: 4.2 G/DL (ref 3.5–5.2)
ALP SERPL-CCNC: 123 U/L (ref 39–117)
ALT SERPL W P-5'-P-CCNC: 39 U/L (ref 1–41)
AST SERPL-CCNC: 28 U/L (ref 1–40)
BILIRUB CONJ SERPL-MCNC: 0.3 MG/DL (ref 0.2–0.3)
BILIRUB INDIRECT SERPL-MCNC: 0.7 MG/DL
BILIRUB SERPL-MCNC: 1 MG/DL (ref 0.2–1.2)
ERYTHROCYTE [DISTWIDTH] IN BLOOD BY AUTOMATED COUNT: 15.9 % (ref 12.3–15.4)
FERRITIN SERPL-MCNC: 58.7 NG/ML (ref 30–400)
HCT VFR BLD AUTO: 36.4 % (ref 37.5–51)
HGB BLD-MCNC: 11.8 G/DL (ref 13–17.7)
IRON 24H UR-MRATE: 56 MCG/DL (ref 59–158)
IRON SATN MFR SERPL: 13 % (ref 20–50)
LYMPHOCYTES # BLD AUTO: 1.9 10*3/MM3 (ref 0.7–3.1)
LYMPHOCYTES NFR BLD AUTO: 21.2 % (ref 19.6–45.3)
MCH RBC QN AUTO: 28.8 PG (ref 26.6–33)
MCHC RBC AUTO-ENTMCNC: 32.6 G/DL (ref 31.5–35.7)
MCV RBC AUTO: 88.3 FL (ref 79–97)
MONOCYTES # BLD AUTO: 0.6 10*3/MM3 (ref 0.1–0.9)
MONOCYTES NFR BLD AUTO: 7.3 % (ref 5–12)
NEUTROPHILS # BLD AUTO: 6.4 10*3/MM3 (ref 1.7–7)
NEUTROPHILS NFR BLD AUTO: 71.5 % (ref 42.7–76)
PLATELET # BLD AUTO: 222 10*3/MM3 (ref 140–450)
PMV BLD AUTO: 7.9 FL (ref 6–12)
PROT SERPL-MCNC: 7.3 G/DL (ref 6–8.5)
RBC # BLD AUTO: 4.12 10*6/MM3 (ref 4.14–5.8)
TIBC SERPL-MCNC: 429 MCG/DL (ref 298–536)
TRANSFERRIN SERPL-MCNC: 288 MG/DL (ref 200–360)
WBC NRBC COR # BLD: 8.9 10*3/MM3 (ref 3.4–10.8)

## 2019-09-18 PROCEDURE — 83540 ASSAY OF IRON: CPT | Performed by: NURSE PRACTITIONER

## 2019-09-18 PROCEDURE — 85025 COMPLETE CBC W/AUTO DIFF WBC: CPT | Performed by: NURSE PRACTITIONER

## 2019-09-18 PROCEDURE — 84466 ASSAY OF TRANSFERRIN: CPT | Performed by: NURSE PRACTITIONER

## 2019-09-18 PROCEDURE — 80076 HEPATIC FUNCTION PANEL: CPT | Performed by: NURSE PRACTITIONER

## 2019-09-18 PROCEDURE — 82728 ASSAY OF FERRITIN: CPT | Performed by: NURSE PRACTITIONER

## 2019-09-18 PROCEDURE — 36415 COLL VENOUS BLD VENIPUNCTURE: CPT | Performed by: NURSE PRACTITIONER

## 2019-09-18 PROCEDURE — 99214 OFFICE O/P EST MOD 30 MIN: CPT | Performed by: NURSE PRACTITIONER

## 2019-09-18 RX ORDER — GABAPENTIN 300 MG/1
300 CAPSULE ORAL NIGHTLY
Qty: 30 CAPSULE | Refills: 5 | Status: SHIPPED | OUTPATIENT
Start: 2019-09-18 | End: 2020-12-01 | Stop reason: SDUPTHER

## 2019-09-18 NOTE — TELEPHONE ENCOUNTER
Pt needs androgel packets and had testosterone test in aug 2019. She gave you a copy of labs and results were included

## 2019-09-18 NOTE — PROGRESS NOTES
Subjective   Dilip Verma is a 69 y.o. male.     History of Present Illness   Here to est care, prev PCP Jaquan [facility]ck[facility] too far, here with wife, part time [facility]  Sees [facility] Dr Hylton with CAD, HTN, CHF, chol with hx of MI on  mg, lasix 40 mg BID, potassium 20 mEq BID, hydralazine 25 mg TID, metoprolol 25 mg daily, checks BP at home variable 130-170s/60-100s, sees Dr Mayen for CKD, with recent elevated liver enzymes 09/11/19 recommended to FU with PCP for recheck, also had low hemoglobin needs recheck today  With IDDM on tresiba 21 u and humalog 3 u with lunch and 5 u dinner last A1C 07/07/19 7.1, sees endo [facility] Dr Sherman, has continuing glucose monitor, sees ophtho Lavonne and [facility] monitoring DM retinopathy, with B neuropathy intermittently on gabapentin 300 mg HS, checks BS average 211  Doesn't sees urology prev saw Dr Velasco > 5 years and thinks had PSA 1 08/19 on flomax prescribed from nephrology, has androgel at home getting from old PCP and not sure if should still be taking or needs refills, will discuss with cardiology pending apt tomorrow  Last colonoscopy Dr Yost with hx of colon polyps overdue colonoscopy   With DDD and spinal stenosis with prev MRI 12/15 with hx of osteoporosis not sgy candidate, sees chiropractor and helps with pain   Sees Dr Cai for sleep apnea with poor sleep and attributes some to PTSD hx of trauma and wondering about ketamine to help with mood and wondering about psych [facility], prev tried wellbutrin and paxil, hx of alcohol abuse and rehab 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    Review of Systems   Constitutional: Negative for fever.   Respiratory: Negative for cough, shortness of breath and wheezing.    Cardiovascular: Negative for chest pain, palpitations and leg swelling.   Gastrointestinal: Negative for abdominal distention, abdominal pain, anal bleeding, blood in stool, constipation, diarrhea, nausea, rectal pain and vomiting.   Endocrine: Negative for cold intolerance, heat intolerance, polydipsia, polyphagia and polyuria.   Genitourinary: Positive for frequency and urgency. Negative for decreased urine volume, difficulty urinating, discharge, dysuria, enuresis, flank pain, genital sores, hematuria, penile pain, penile swelling, scrotal swelling and testicular pain.   Neurological: Negative for dizziness and headaches.   Psychiatric/Behavioral: Positive for decreased concentration, dysphoric mood and sleep disturbance. Negative for hallucinations, self-injury and suicidal ideas. The patient is nervous/anxious. The patient is not hyperactive.    All other systems reviewed and are negative.      Objective   Physical Exam   Constitutional: He is oriented to person, place, and time. He appears well-developed and well-nourished.   HENT:   Head: Normocephalic and atraumatic.   Eyes: Conjunctivae and EOM are normal. Pupils are equal, round, and reactive to light.   Cardiovascular: Normal rate, regular rhythm and normal heart sounds.   Pulmonary/Chest: Effort normal and breath sounds normal.   Abdominal: Soft. He exhibits no distension and no mass. There is no tenderness. There is no rebound, no guarding and no CVA tenderness. No hernia.   Musculoskeletal: Normal range of motion.   Neurological: He is alert and oriented to person, place, and time.   Skin: Skin is warm and dry.   Psychiatric: His behavior is normal. Judgment and thought content normal.    hyperverbal   Vitals reviewed.      Assessment/Plan   Dilip was seen today for establish care.    Diagnoses and all orders for this visit:    Elevated liver enzymes  -     Hepatic Function Panel    Coronary artery disease involving native coronary artery of native heart without angina pectoris    Chronic diastolic CHF (congestive heart failure) (CMS/[facility])    Essential hypertension    Mixed hyperlipidemia    Type 2 diabetes mellitus with stage 3 chronic kidney disease, with long-term current use of insulin (CMS/[facility])    Low hemoglobin  -     CBC & Differential  -     Ferritin  -     Iron Profile  -     Cancel: Occult Blood X 1, Stool - Stool, Per Rectum  -     CBC Auto Differential  -     Occult Blood, Fecal By Immunoassay - Stool, Per Rectum; Future    Diabetic polyneuropathy associated with type 2 diabetes mellitus (CMS/[facility])  -     Compliance Drug Analysis, Ur - Urine, Clean Catch    Long-term use of high-risk medication  -     Compliance Drug Analysis, Ur - Urine, Clean Catch    PTSD (post-traumatic stress disorder)    Polyp of colon, unspecified part of colon, unspecified type    Other orders  -     gabapentin (NEURONTIN) 300 MG capsule; Take 1 capsule by mouth Every Night.    will get prev PCP records to review, check labs and call with results, pending FU with cardiology tomorrow, check BP and BS at home, Enc healthy diet and regular exercise for wt loss, enc overdue colonoscopy, consider urology consult, discussed possible psych consult with [facility], refill gabapentin, cont all chronic dz meds, The patient has read and signed the [facility] Controlled Substance Contract UTD today, [place] reviewed today, pending UDS. I will continue to see patient for regular follow up appointments.  They are well controlled on their medication.  GENEVA is updated every 3 months. The patient is aware of the potential for addiction and dependence.

## 2019-09-18 NOTE — PATIENT INSTRUCTIONS
will get prev PCP records to review, check labs and call with results, pending FU with cardiology tomorrow, check BP and BS at home, Enc healthy diet and regular exercise for wt loss, enc overdue colonoscopy, consider urology consult, discussed possible psych consult with [facility], refill gabapentin, cont all chronic dz meds, The patient has read and signed the [facility] Controlled Substance Contract UTD today, [place] reviewed today, pending UDS. I will continue to see patient for regular follow up appointments.  They are well controlled on their medication.  GENEVA is updated every 3 months. The patient is aware of the potential for addiction and dependence.

## 2019-09-19 ENCOUNTER — CLINICAL SUPPORT (OUTPATIENT)
Dept: CARDIOLOGY | Facility: CLINIC | Age: 69
End: 2019-09-19

## 2019-09-20 ENCOUNTER — TELEPHONE (OUTPATIENT)
Dept: FAMILY MEDICINE CLINIC | Facility: CLINIC | Age: 69
End: 2019-09-20

## 2019-09-20 NOTE — TELEPHONE ENCOUNTER
Liver enzymes improved we can monitor.  Hemoglobin improved and almost normal as well. Recommend iron rich diet.

## 2019-09-24 ENCOUNTER — TELEPHONE (OUTPATIENT)
Dept: CARDIOLOGY | Facility: CLINIC | Age: 69
End: 2019-09-24

## 2019-09-24 NOTE — TELEPHONE ENCOUNTER
"09/24/19  12:18 PM  Dilip Verma  1950  Home Phone [phone]     Dr. Hylton,    Mr. Verma' wife, Bertin, called again today. She told me that his heart rate has been running in 53-57 today and yesterday. I asked if he is symptomatic. She told me he is \"out of it\" because he hasn't slept much the past week. (Back story: They are following Dr. Cai's suggestion for ketamine infusions to help with night time restlessness and nightmares related to childhood PTSD. He has not had any infusions yet, though.)    She is calling to ask if they need to decrease his Metoprolol from 25 mg BID to once a day. His B/P has been in the 120s/70s and his HR normally runs in the 60s-70s (except for the last two days).    Cardiac-related medications:  Lasix 40 mg BID  K-DUR 20 mEq 2 tabs BID  Aspirin 325 mg daily  Hydralazine 25 mg TID  Metoprolol 25 mg TID      Rosa Maria Shrestha, RN  Triage RN LC      "

## 2019-09-25 LAB — CONV REPORT SUMMARY: NORMAL

## 2019-09-27 ENCOUNTER — TELEPHONE (OUTPATIENT)
Dept: SLEEP MEDICINE | Facility: HOSPITAL | Age: 69
End: 2019-09-27

## 2019-09-30 ENCOUNTER — LAB (OUTPATIENT)
Dept: FAMILY MEDICINE CLINIC | Facility: CLINIC | Age: 69
End: 2019-09-30

## 2019-09-30 DIAGNOSIS — D64.9 LOW HEMOGLOBIN: ICD-10-CM

## 2019-09-30 LAB — HEMOCCULT STL QL IA: NEGATIVE

## 2019-09-30 PROCEDURE — 82274 ASSAY TEST FOR BLOOD FECAL: CPT | Performed by: NURSE PRACTITIONER

## 2019-10-02 ENCOUNTER — TELEPHONE (OUTPATIENT)
Dept: SLEEP MEDICINE | Facility: HOSPITAL | Age: 69
End: 2019-10-02

## 2019-10-11 ENCOUNTER — DOCUMENTATION (OUTPATIENT)
Dept: CARDIOLOGY | Facility: CLINIC | Age: 69
End: 2019-10-11

## 2019-10-12 NOTE — PROGRESS NOTES
I spoke with the patient's wife, she called the answering service tonight.  She gave him metoprolol tartrate 12.5 mg at 9 PM and at 10:30 PM his blood pressure was 76/46.  She rechecked it again and his systolic pressure was 89.  He was feeling lightheaded and weak but was able to communicate with her.  He has normal LV function.  My advice was to increase his fluid intake and liberalize his salt intake tonight, and that if he did not f eel better or he felt worse than to go to the emergency room.  She indicated that she understood.

## 2019-10-18 ENCOUNTER — TELEPHONE (OUTPATIENT)
Dept: CARDIOLOGY | Facility: CLINIC | Age: 69
End: 2019-10-18

## 2019-10-18 NOTE — TELEPHONE ENCOUNTER
10/18/19  Bertin, patient's wife, called to report bp/hr for patient since their call to the on call nurse over the weekend.  She gave me bp/hr readings :   10/10  Took meds at 9:00 am and took bp at 10:15 - 76/46  HR 61   At 11:30 92/51  HR 66         then at 3:10 am  109/68         10/11   Sat aft  171/94  HR 79  10/14   150/86  HR 78  10/16   159/90  HR 74  10/18   118/71  HR 65   &   114/65    HR 65    I confirmed the medications:  Metoprolol 25mg 1/2 tab bid; flomax 0.4mg bid and Lasix 40mg bid - HCTZ is being held currently and the swelling and weight are steady with weight from 189-191.    Her ph [phone]/demetrius

## 2019-10-21 ENCOUNTER — OFFICE VISIT (OUTPATIENT)
Dept: FAMILY MEDICINE CLINIC | Facility: CLINIC | Age: 69
End: 2019-10-21

## 2019-10-21 VITALS
WEIGHT: 192.8 LBS | BODY MASS INDEX: 28.56 KG/M2 | OXYGEN SATURATION: 98 % | TEMPERATURE: 97.7 F | HEART RATE: 86 BPM | DIASTOLIC BLOOD PRESSURE: 92 MMHG | SYSTOLIC BLOOD PRESSURE: 130 MMHG | HEIGHT: 69 IN

## 2019-10-21 DIAGNOSIS — I10 HYPERTENSION, ESSENTIAL: Primary | ICD-10-CM

## 2019-10-21 DIAGNOSIS — G47.00 INSOMNIA, UNSPECIFIED TYPE: ICD-10-CM

## 2019-10-21 DIAGNOSIS — D64.9 ANEMIA, UNSPECIFIED TYPE: ICD-10-CM

## 2019-10-21 LAB
ANION GAP SERPL CALCULATED.3IONS-SCNC: 10.6 MMOL/L (ref 5–15)
BASOPHILS # BLD AUTO: 0.05 10*3/MM3 (ref 0–0.2)
BASOPHILS NFR BLD AUTO: 0.6 % (ref 0–1.5)
BUN BLD-MCNC: 23 MG/DL (ref 8–23)
BUN/CREAT SERPL: 19 (ref 7–25)
CALCIUM SPEC-SCNC: 9.2 MG/DL (ref 8.6–10.5)
CHLORIDE SERPL-SCNC: 99 MMOL/L (ref 98–107)
CO2 SERPL-SCNC: 29.4 MMOL/L (ref 22–29)
CREAT BLD-MCNC: 1.21 MG/DL (ref 0.76–1.27)
DEPRECATED RDW RBC AUTO: 46.5 FL (ref 37–54)
EOSINOPHIL # BLD AUTO: 0.27 10*3/MM3 (ref 0–0.4)
EOSINOPHIL NFR BLD AUTO: 3.4 % (ref 0.3–6.2)
ERYTHROCYTE [DISTWIDTH] IN BLOOD BY AUTOMATED COUNT: 14.4 % (ref 12.3–15.4)
FERRITIN SERPL-MCNC: 49.2 NG/ML (ref 30–400)
FOLATE SERPL-MCNC: 15.4 NG/ML (ref 4.78–24.2)
GFR SERPL CREATININE-BSD FRML MDRD: 59 ML/MIN/1.73
GLUCOSE BLD-MCNC: 196 MG/DL (ref 65–99)
HCT VFR BLD AUTO: 39 % (ref 37.5–51)
HGB BLD-MCNC: 12.7 G/DL (ref 13–17.7)
IMM GRANULOCYTES # BLD AUTO: 0.03 10*3/MM3 (ref 0–0.05)
IMM GRANULOCYTES NFR BLD AUTO: 0.4 % (ref 0–0.5)
IRON 24H UR-MRATE: 60 MCG/DL (ref 59–158)
IRON SATN MFR SERPL: 13 % (ref 20–50)
LYMPHOCYTES # BLD AUTO: 1.41 10*3/MM3 (ref 0.7–3.1)
LYMPHOCYTES NFR BLD AUTO: 17.8 % (ref 19.6–45.3)
MCH RBC QN AUTO: 28.9 PG (ref 26.6–33)
MCHC RBC AUTO-ENTMCNC: 32.6 G/DL (ref 31.5–35.7)
MCV RBC AUTO: 88.6 FL (ref 79–97)
MONOCYTES # BLD AUTO: 0.58 10*3/MM3 (ref 0.1–0.9)
MONOCYTES NFR BLD AUTO: 7.3 % (ref 5–12)
NEUTROPHILS # BLD AUTO: 5.59 10*3/MM3 (ref 1.7–7)
NEUTROPHILS NFR BLD AUTO: 70.5 % (ref 42.7–76)
NRBC BLD AUTO-RTO: 0 /100 WBC (ref 0–0.2)
PLATELET # BLD AUTO: 222 10*3/MM3 (ref 140–450)
PMV BLD AUTO: 10.8 FL (ref 6–12)
POTASSIUM BLD-SCNC: 5.2 MMOL/L (ref 3.5–5.2)
RBC # BLD AUTO: 4.4 10*6/MM3 (ref 4.14–5.8)
SODIUM BLD-SCNC: 139 MMOL/L (ref 136–145)
TIBC SERPL-MCNC: 448 MCG/DL (ref 298–536)
TRANSFERRIN SERPL-MCNC: 301 MG/DL (ref 200–360)
VIT B12 BLD-MCNC: 453 PG/ML (ref 211–946)
WBC NRBC COR # BLD: 7.93 10*3/MM3 (ref 3.4–10.8)

## 2019-10-21 PROCEDURE — 82607 VITAMIN B-12: CPT | Performed by: INTERNAL MEDICINE

## 2019-10-21 PROCEDURE — 36415 COLL VENOUS BLD VENIPUNCTURE: CPT | Performed by: INTERNAL MEDICINE

## 2019-10-21 PROCEDURE — 99214 OFFICE O/P EST MOD 30 MIN: CPT | Performed by: INTERNAL MEDICINE

## 2019-10-21 PROCEDURE — 84466 ASSAY OF TRANSFERRIN: CPT | Performed by: INTERNAL MEDICINE

## 2019-10-21 PROCEDURE — 82746 ASSAY OF FOLIC ACID SERUM: CPT | Performed by: INTERNAL MEDICINE

## 2019-10-21 PROCEDURE — 83540 ASSAY OF IRON: CPT | Performed by: INTERNAL MEDICINE

## 2019-10-21 PROCEDURE — 80048 BASIC METABOLIC PNL TOTAL CA: CPT | Performed by: INTERNAL MEDICINE

## 2019-10-21 PROCEDURE — 82728 ASSAY OF FERRITIN: CPT | Performed by: INTERNAL MEDICINE

## 2019-10-21 PROCEDURE — 85025 COMPLETE CBC W/AUTO DIFF WBC: CPT | Performed by: INTERNAL MEDICINE

## 2019-10-21 RX ORDER — TRAZODONE HYDROCHLORIDE 50 MG/1
50 TABLET ORAL NIGHTLY
Qty: 30 TABLET | Refills: 0 | Status: SHIPPED | OUTPATIENT
Start: 2019-10-21 | End: 2019-11-19 | Stop reason: SDUPTHER

## 2019-10-21 RX ORDER — HYDRALAZINE HYDROCHLORIDE 10 MG/1
10 TABLET, FILM COATED ORAL 2 TIMES DAILY
Qty: 60 TABLET | Refills: 0 | Status: ON HOLD | OUTPATIENT
Start: 2019-10-21 | End: 2019-11-07 | Stop reason: SDUPTHER

## 2019-10-21 NOTE — TELEPHONE ENCOUNTER
Hydralazine 10mg how often? I'm ok with him trying it. Monitor. He needs to pick one person to manage BP. Otherwise too many cooks in the kitchen

## 2019-10-21 NOTE — TELEPHONE ENCOUNTER
Continue current medications as he is taking them and continue monitoring blood pressure.  He is obviously having labile blood pressure readings but we just need to monitor those.

## 2019-10-21 NOTE — TELEPHONE ENCOUNTER
10/21/19  I spoke with Bertin and gave her this information.  She states the patient saw pcp today who wants to begin pt on hydralazine 10 mg but has drawn labs prior to starting this med.  Bertin asked if you are ok with pt being on this med too./demetrius

## 2019-10-21 NOTE — PROGRESS NOTES
Subjective   Dilip Verma is a 69 y.o. male.   Patient having trouble with insomnia also blood pressure somewhat variable.  Up to 160/90  History of Present Illness trouble sleeping has a hard time staying asleep.  Very restless chronically short sleep tried several things over time.  Describes of having PTSD.  They do have a list from the genetic testing to show what medications would likely work or interact with him.  I will try to get that list is without see it in our system I will let patient try trazodone  50 mg nightly as needed for sleep patient is to check the genetic testing list before picking his medicine up.  In the interim for patient has spouse can get the list to me.  Does have endocrinologist follow him as well.  Blood pressure ranges somewhat high historically since the medicine was easy get orthostatic so we will have to watch this closely he is already been cut back on his metoprolol and strengthening due to unspecified tolerance issues with that we will let patient try hydralazine as 10 mg twice daily and he has been on 25 mg beer 3 times daily before does not recall that causing problems for him.  He will continue other medicines as is and call in blood pressure readings in a week he is follow-up on anemia as well with last testing somewhat low.  The three-vessel CABG on 7/18/2019.  Carotid artery disease with bilateral mild stenosis.  Also on Lipitor.  bp elev  Endo follows dm  Drug allergies are numerous please see chart including antibiotics penicillin sulfa and tetracycline.  Patient's former smoker stopped at age 47 read family is positive for depression cancer undefined heart disease hypertension kidney disease thyroid disease depression alcohol abuse lung disease and glaucoma  Review of Systems   Psychiatric/Behavioral: Positive for sleep disturbance.   All other systems reviewed and are negative.      Objective   Vitals:    10/21/19 1334   BP: 130/92   Pulse: 86   Temp: 97.7 °F (36.5  °C)   SpO2: 98%   Weight: 87.5 kg (192 lb 12.8 oz)     Physical Exam   Constitutional: He appears well-developed and well-nourished.   HENT:   Head: Normocephalic and atraumatic.   Eyes: Conjunctivae are normal. Pupils are equal, round, and reactive to light.   Cardiovascular: Normal rate, regular rhythm and normal heart sounds.   Pulmonary/Chest: Effort normal and breath sounds normal.   Abdominal: Soft. Bowel sounds are normal.   Neurological: He is alert.   Uses walker for ambulation apparently persistent balance issues secondary to thalamic stroke.  Relatively stable with walker.   Skin: Skin is warm and dry.   Nursing note and vitals reviewed.      Lab Results   Component Value Date    INR 1.48 (H) 07/19/2019    INR 1.63 (H) 07/18/2019    INR 1.75 (H) 07/18/2019       Procedures    Assessment/Plan   .  Hypertension suboptimally controlled plan add hydralazine 2 mg twice daily: Readings in 1 week if still less than perfect will increase to 1 tablet 3 times daily    2.  Insomnia trazodone trial 50 g nightly they are to check the genomic testing for helpful versus non-helpful medicines before they appear to pick this up prescribing cases are listed as not being well-tolerated    3.  Insomnia get updated values call regarding lab results in 3 days time  Much of this encounter note is an electronic transcription/translation of spoken language to printed text.  The electronic translation of spoken language may permit erroneous, or at times, nonsensical words or phrases to be inadvertently transcribed.  Although I have reviewed the note for such errors, some may still exist. If there are questions or for further clarification, please contact me.

## 2019-10-23 ENCOUNTER — OFFICE VISIT (OUTPATIENT)
Dept: CARDIAC SURGERY | Facility: CLINIC | Age: 69
End: 2019-10-23

## 2019-10-23 VITALS
OXYGEN SATURATION: 96 % | HEIGHT: 69 IN | WEIGHT: 189 LBS | BODY MASS INDEX: 27.99 KG/M2 | SYSTOLIC BLOOD PRESSURE: 167 MMHG | DIASTOLIC BLOOD PRESSURE: 87 MMHG | TEMPERATURE: 97.8 F | RESPIRATION RATE: 20 BRPM | HEART RATE: 78 BPM

## 2019-10-23 DIAGNOSIS — R13.10 DYSPHAGIA, UNSPECIFIED TYPE: Primary | ICD-10-CM

## 2019-10-23 DIAGNOSIS — Z95.1 S/P CABG (CORONARY ARTERY BYPASS GRAFT): ICD-10-CM

## 2019-10-23 PROCEDURE — 99024 POSTOP FOLLOW-UP VISIT: CPT | Performed by: THORACIC SURGERY (CARDIOTHORACIC VASCULAR SURGERY)

## 2019-10-25 ENCOUNTER — TELEPHONE (OUTPATIENT)
Dept: FAMILY MEDICINE CLINIC | Facility: CLINIC | Age: 69
End: 2019-10-25

## 2019-10-25 NOTE — TELEPHONE ENCOUNTER
PATIENT WAS SEEN ON Monday AND HASN'T HAD HIS BLOOD PRESSURE MEDICATION SINCE YESTERDAY. PATIENTS BP FOR THIS MORNING /72. WANTS TO KNOW WHEN DOES HE NEED TO TAKE HIS BP MEDICATION. PATIENT STARTED ON traZODone (DESYREL) 50 MG tablet Monday AND THE FIRST NIGHT HE DID FINE. THE SECOND DAY PATIENT HAD TO TAKE AN EXTRA TRAZODONE.

## 2019-10-25 NOTE — TELEPHONE ENCOUNTER
Should take his blood pressure medicine when blood pressure is 140/90 or greater.  Really just one is a maintenance dose and add in others layered in.

## 2019-11-05 ENCOUNTER — APPOINTMENT (OUTPATIENT)
Dept: NUCLEAR MEDICINE | Facility: HOSPITAL | Age: 69
End: 2019-11-05

## 2019-11-05 ENCOUNTER — APPOINTMENT (OUTPATIENT)
Dept: GENERAL RADIOLOGY | Facility: HOSPITAL | Age: 69
End: 2019-11-05

## 2019-11-05 ENCOUNTER — HOSPITAL ENCOUNTER (OUTPATIENT)
Facility: HOSPITAL | Age: 69
Discharge: HOME | End: 2019-11-07
Attending: EMERGENCY MEDICINE | Admitting: INTERNAL MEDICINE

## 2019-11-05 ENCOUNTER — APPOINTMENT (OUTPATIENT)
Dept: CARDIOLOGY | Facility: HOSPITAL | Age: 69
End: 2019-11-05

## 2019-11-05 DIAGNOSIS — I21.4 NSTEMI (NON-ST ELEVATED MYOCARDIAL INFARCTION) (HCC): Primary | ICD-10-CM

## 2019-11-05 LAB
ALBUMIN SERPL-MCNC: 4 G/DL (ref 3.5–5.2)
ALBUMIN/GLOB SERPL: 1.2 G/DL
ALP SERPL-CCNC: 78 U/L (ref 39–117)
ALT SERPL W P-5'-P-CCNC: 21 U/L (ref 1–41)
ANION GAP SERPL CALCULATED.3IONS-SCNC: 10 MMOL/L (ref 5–15)
AST SERPL-CCNC: 15 U/L (ref 1–40)
BASOPHILS # BLD AUTO: 0.04 10*3/MM3 (ref 0–0.2)
BASOPHILS NFR BLD AUTO: 0.5 % (ref 0–1.5)
BH CV ECHO MEAS - ACS: 2 CM
BH CV ECHO MEAS - AO MAX PG (FULL): 4.5 MMHG
BH CV ECHO MEAS - AO MAX PG: 8.2 MMHG
BH CV ECHO MEAS - AO MEAN PG (FULL): 2 MMHG
BH CV ECHO MEAS - AO MEAN PG: 4 MMHG
BH CV ECHO MEAS - AO ROOT AREA (BSA CORRECTED): 1.6
BH CV ECHO MEAS - AO ROOT AREA: 8 CM^2
BH CV ECHO MEAS - AO ROOT DIAM: 3.2 CM
BH CV ECHO MEAS - AO V2 MAX: 143 CM/SEC
BH CV ECHO MEAS - AO V2 MEAN: 97.8 CM/SEC
BH CV ECHO MEAS - AO V2 VTI: 26.5 CM
BH CV ECHO MEAS - AVA(I,A): 2.3 CM^2
BH CV ECHO MEAS - AVA(I,D): 2.3 CM^2
BH CV ECHO MEAS - AVA(V,A): 2.1 CM^2
BH CV ECHO MEAS - AVA(V,D): 2.1 CM^2
BH CV ECHO MEAS - BSA(HAYCOCK): 2.1 M^2
BH CV ECHO MEAS - BSA: 2 M^2
BH CV ECHO MEAS - BZI_BMI: 28.4 KILOGRAMS/M^2
BH CV ECHO MEAS - BZI_METRIC_HEIGHT: 175.3 CM
BH CV ECHO MEAS - BZI_METRIC_WEIGHT: 87.1 KG
BH CV ECHO MEAS - EDV(CUBED): 35.9 ML
BH CV ECHO MEAS - EDV(MOD-SP2): 77 ML
BH CV ECHO MEAS - EDV(MOD-SP4): 88 ML
BH CV ECHO MEAS - EDV(TEICH): 44.1 ML
BH CV ECHO MEAS - EF(CUBED): 61.5 %
BH CV ECHO MEAS - EF(MOD-BP): 62 %
BH CV ECHO MEAS - EF(MOD-SP2): 62.3 %
BH CV ECHO MEAS - EF(MOD-SP4): 63.6 %
BH CV ECHO MEAS - EF(TEICH): 54.3 %
BH CV ECHO MEAS - ESV(CUBED): 13.8 ML
BH CV ECHO MEAS - ESV(MOD-SP2): 29 ML
BH CV ECHO MEAS - ESV(MOD-SP4): 32 ML
BH CV ECHO MEAS - ESV(TEICH): 20.2 ML
BH CV ECHO MEAS - FS: 27.3 %
BH CV ECHO MEAS - IVS/LVPW: 1.2
BH CV ECHO MEAS - IVSD: 1.7 CM
BH CV ECHO MEAS - LAT PEAK E' VEL: 5.6 CM/SEC
BH CV ECHO MEAS - LV DIASTOLIC VOL/BSA (35-75): 43.3 ML/M^2
BH CV ECHO MEAS - LV MASS(C)D: 194 GRAMS
BH CV ECHO MEAS - LV MASS(C)DI: 95.5 GRAMS/M^2
BH CV ECHO MEAS - LV MAX PG: 3.6 MMHG
BH CV ECHO MEAS - LV MEAN PG: 2 MMHG
BH CV ECHO MEAS - LV SYSTOLIC VOL/BSA (12-30): 15.8 ML/M^2
BH CV ECHO MEAS - LV V1 MAX: 95.3 CM/SEC
BH CV ECHO MEAS - LV V1 MEAN: 66.2 CM/SEC
BH CV ECHO MEAS - LV V1 VTI: 19.3 CM
BH CV ECHO MEAS - LVIDD: 3.3 CM
BH CV ECHO MEAS - LVIDS: 2.4 CM
BH CV ECHO MEAS - LVLD AP2: 7.8 CM
BH CV ECHO MEAS - LVLD AP4: 7.3 CM
BH CV ECHO MEAS - LVLS AP2: 6.6 CM
BH CV ECHO MEAS - LVLS AP4: 6 CM
BH CV ECHO MEAS - LVOT AREA (M): 3.1 CM^2
BH CV ECHO MEAS - LVOT AREA: 3.1 CM^2
BH CV ECHO MEAS - LVOT DIAM: 2 CM
BH CV ECHO MEAS - LVPWD: 1.5 CM
BH CV ECHO MEAS - MED PEAK E' VEL: 3.7 CM/SEC
BH CV ECHO MEAS - MR MAX PG: 51.3 MMHG
BH CV ECHO MEAS - MR MAX VEL: 358 CM/SEC
BH CV ECHO MEAS - MV A DUR: 0.15 SEC
BH CV ECHO MEAS - MV A MAX VEL: 62.6 CM/SEC
BH CV ECHO MEAS - MV DEC SLOPE: 584 CM/SEC^2
BH CV ECHO MEAS - MV DEC TIME: 0.16 SEC
BH CV ECHO MEAS - MV E MAX VEL: 114 CM/SEC
BH CV ECHO MEAS - MV E/A: 1.8
BH CV ECHO MEAS - MV MAX PG: 8 MMHG
BH CV ECHO MEAS - MV MEAN PG: 3 MMHG
BH CV ECHO MEAS - MV P1/2T MAX VEL: 141 CM/SEC
BH CV ECHO MEAS - MV P1/2T: 70.7 MSEC
BH CV ECHO MEAS - MV V2 MAX: 141 CM/SEC
BH CV ECHO MEAS - MV V2 MEAN: 72.8 CM/SEC
BH CV ECHO MEAS - MV V2 VTI: 31.7 CM
BH CV ECHO MEAS - MVA P1/2T LCG: 1.6 CM^2
BH CV ECHO MEAS - MVA(P1/2T): 3.1 CM^2
BH CV ECHO MEAS - MVA(VTI): 1.9 CM^2
BH CV ECHO MEAS - PA ACC TIME: 0.1 SEC
BH CV ECHO MEAS - PA MAX PG (FULL): 0.98 MMHG
BH CV ECHO MEAS - PA MAX PG: 2.3 MMHG
BH CV ECHO MEAS - PA PR(ACCEL): 32.7 MMHG
BH CV ECHO MEAS - PA V2 MAX: 76 CM/SEC
BH CV ECHO MEAS - PI END-D VEL: 133 CM/SEC
BH CV ECHO MEAS - PULM A REVS DUR: 0.14 SEC
BH CV ECHO MEAS - PULM A REVS VEL: 30.9 CM/SEC
BH CV ECHO MEAS - PULM DIAS VEL: 97.2 CM/SEC
BH CV ECHO MEAS - PULM S/D: 0.52
BH CV ECHO MEAS - PULM SYS VEL: 50.1 CM/SEC
BH CV ECHO MEAS - PVA(V,A): 2.9 CM^2
BH CV ECHO MEAS - PVA(V,D): 2.9 CM^2
BH CV ECHO MEAS - QP/QS: 0.85
BH CV ECHO MEAS - RAP SYSTOLE: 15 MMHG
BH CV ECHO MEAS - RV MAX PG: 1.3 MMHG
BH CV ECHO MEAS - RV MEAN PG: 1 MMHG
BH CV ECHO MEAS - RV V1 MAX: 57.6 CM/SEC
BH CV ECHO MEAS - RV V1 MEAN: 40 CM/SEC
BH CV ECHO MEAS - RV V1 VTI: 13.5 CM
BH CV ECHO MEAS - RVOT AREA: 3.8 CM^2
BH CV ECHO MEAS - RVOT DIAM: 2.2 CM
BH CV ECHO MEAS - RVSP: 68 MMHG
BH CV ECHO MEAS - SI(AO): 105 ML/M^2
BH CV ECHO MEAS - SI(CUBED): 10.9 ML/M^2
BH CV ECHO MEAS - SI(LVOT): 29.9 ML/M^2
BH CV ECHO MEAS - SI(MOD-SP2): 23.6 ML/M^2
BH CV ECHO MEAS - SI(MOD-SP4): 27.6 ML/M^2
BH CV ECHO MEAS - SI(TEICH): 11.8 ML/M^2
BH CV ECHO MEAS - SV(AO): 213.1 ML
BH CV ECHO MEAS - SV(CUBED): 22.1 ML
BH CV ECHO MEAS - SV(LVOT): 60.6 ML
BH CV ECHO MEAS - SV(MOD-SP2): 48 ML
BH CV ECHO MEAS - SV(MOD-SP4): 56 ML
BH CV ECHO MEAS - SV(RVOT): 51.3 ML
BH CV ECHO MEAS - SV(TEICH): 24 ML
BH CV ECHO MEAS - TAPSE (>1.6): 1.5 CM2
BH CV ECHO MEAS - TR MAX VEL: 364 CM/SEC
BH CV ECHO MEASUREMENTS AVERAGE E/E' RATIO: 24.52
BH CV STRESS COMMENTS STAGE 1: NORMAL
BH CV STRESS DOSE REGADENOSON STAGE 1: 0.4
BH CV STRESS DURATION MIN STAGE 1: 0
BH CV STRESS DURATION SEC STAGE 1: 10
BH CV STRESS PROTOCOL 1: NORMAL
BH CV STRESS RECOVERY BP: NORMAL MMHG
BH CV STRESS RECOVERY HR: 78 BPM
BH CV STRESS STAGE 1: 1
BH CV XLRA - RV BASE: 3.7 CM
BH CV XLRA - RV LENGTH: 7.3 CM
BH CV XLRA - RV MID: 2.9 CM
BH CV XLRA - TDI S': 9.3 CM/SEC
BILIRUB SERPL-MCNC: 0.4 MG/DL (ref 0.2–1.2)
BUN BLD-MCNC: 32 MG/DL (ref 8–23)
BUN/CREAT SERPL: 28.3 (ref 7–25)
CALCIUM SPEC-SCNC: 9.2 MG/DL (ref 8.6–10.5)
CHLORIDE SERPL-SCNC: 102 MMOL/L (ref 98–107)
CO2 SERPL-SCNC: 30 MMOL/L (ref 22–29)
CREAT BLD-MCNC: 1.13 MG/DL (ref 0.76–1.27)
DEPRECATED RDW RBC AUTO: 52.7 FL (ref 37–54)
EOSINOPHIL # BLD AUTO: 0.33 10*3/MM3 (ref 0–0.4)
EOSINOPHIL NFR BLD AUTO: 4.2 % (ref 0.3–6.2)
ERYTHROCYTE [DISTWIDTH] IN BLOOD BY AUTOMATED COUNT: 15.9 % (ref 12.3–15.4)
GFR SERPL CREATININE-BSD FRML MDRD: 64 ML/MIN/1.73
GLOBULIN UR ELPH-MCNC: 3.4 GM/DL
GLUCOSE BLD-MCNC: 295 MG/DL (ref 65–99)
GLUCOSE BLDC GLUCOMTR-MCNC: 101 MG/DL (ref 70–130)
GLUCOSE BLDC GLUCOMTR-MCNC: 206 MG/DL (ref 70–130)
HCT VFR BLD AUTO: 41.6 % (ref 37.5–51)
HGB BLD-MCNC: 13.2 G/DL (ref 13–17.7)
IMM GRANULOCYTES # BLD AUTO: 0.03 10*3/MM3 (ref 0–0.05)
IMM GRANULOCYTES NFR BLD AUTO: 0.4 % (ref 0–0.5)
LEFT ATRIUM VOLUME INDEX: 25 ML/M2
LV EF 2D ECHO EST: 62 %
LV EF NUC BP: 52 %
LYMPHOCYTES # BLD AUTO: 1.78 10*3/MM3 (ref 0.7–3.1)
LYMPHOCYTES NFR BLD AUTO: 22.7 % (ref 19.6–45.3)
MAXIMAL PREDICTED HEART RATE: 150 BPM
MCH RBC QN AUTO: 28.8 PG (ref 26.6–33)
MCHC RBC AUTO-ENTMCNC: 31.7 G/DL (ref 31.5–35.7)
MCV RBC AUTO: 90.6 FL (ref 79–97)
MONOCYTES # BLD AUTO: 0.66 10*3/MM3 (ref 0.1–0.9)
MONOCYTES NFR BLD AUTO: 8.4 % (ref 5–12)
NEUTROPHILS # BLD AUTO: 4.99 10*3/MM3 (ref 1.7–7)
NEUTROPHILS NFR BLD AUTO: 63.8 % (ref 42.7–76)
NRBC BLD AUTO-RTO: 0 /100 WBC (ref 0–0.2)
PERCENT MAX PREDICTED HR: 53.33 %
PLATELET # BLD AUTO: 215 10*3/MM3 (ref 140–450)
PMV BLD AUTO: 9.9 FL (ref 6–12)
POTASSIUM BLD-SCNC: 4.8 MMOL/L (ref 3.5–5.2)
PROT SERPL-MCNC: 7.4 G/DL (ref 6–8.5)
RBC # BLD AUTO: 4.59 10*6/MM3 (ref 4.14–5.8)
SODIUM BLD-SCNC: 142 MMOL/L (ref 136–145)
STRESS BASELINE BP: NORMAL MMHG
STRESS BASELINE HR: 72 BPM
STRESS PERCENT HR: 63 %
STRESS POST PEAK BP: NORMAL MMHG
STRESS POST PEAK HR: 80 BPM
STRESS TARGET HR: 128 BPM
TROPONIN T SERPL-MCNC: 0.05 NG/ML (ref 0–0.03)
TROPONIN T SERPL-MCNC: 0.05 NG/ML (ref 0–0.03)
WBC NRBC COR # BLD: 7.83 10*3/MM3 (ref 3.4–10.8)

## 2019-11-05 PROCEDURE — 78452 HT MUSCLE IMAGE SPECT MULT: CPT

## 2019-11-05 PROCEDURE — 93306 TTE W/DOPPLER COMPLETE: CPT

## 2019-11-05 PROCEDURE — G0378 HOSPITAL OBSERVATION PER HR: HCPCS

## 2019-11-05 PROCEDURE — 80053 COMPREHEN METABOLIC PANEL: CPT | Performed by: PHYSICIAN ASSISTANT

## 2019-11-05 PROCEDURE — 93005 ELECTROCARDIOGRAM TRACING: CPT

## 2019-11-05 PROCEDURE — 93017 CV STRESS TEST TRACING ONLY: CPT

## 2019-11-05 PROCEDURE — A9500 TC99M SESTAMIBI: HCPCS | Performed by: INTERNAL MEDICINE

## 2019-11-05 PROCEDURE — 71045 X-RAY EXAM CHEST 1 VIEW: CPT

## 2019-11-05 PROCEDURE — 93306 TTE W/DOPPLER COMPLETE: CPT | Performed by: INTERNAL MEDICINE

## 2019-11-05 PROCEDURE — 93010 ELECTROCARDIOGRAM REPORT: CPT | Performed by: INTERNAL MEDICINE

## 2019-11-05 PROCEDURE — 0 TECHNETIUM SESTAMIBI: Performed by: INTERNAL MEDICINE

## 2019-11-05 PROCEDURE — 93018 CV STRESS TEST I&R ONLY: CPT | Performed by: INTERNAL MEDICINE

## 2019-11-05 PROCEDURE — 84484 ASSAY OF TROPONIN QUANT: CPT | Performed by: PHYSICIAN ASSISTANT

## 2019-11-05 PROCEDURE — 82962 GLUCOSE BLOOD TEST: CPT

## 2019-11-05 PROCEDURE — 90791 PSYCH DIAGNOSTIC EVALUATION: CPT | Performed by: SOCIAL WORKER

## 2019-11-05 PROCEDURE — 85025 COMPLETE CBC W/AUTO DIFF WBC: CPT | Performed by: PHYSICIAN ASSISTANT

## 2019-11-05 PROCEDURE — 78452 HT MUSCLE IMAGE SPECT MULT: CPT | Performed by: INTERNAL MEDICINE

## 2019-11-05 PROCEDURE — 93005 ELECTROCARDIOGRAM TRACING: CPT | Performed by: EMERGENCY MEDICINE

## 2019-11-05 PROCEDURE — 99220 PR INITIAL OBSERVATION CARE/DAY 70 MINUTES: CPT | Performed by: INTERNAL MEDICINE

## 2019-11-05 PROCEDURE — 93016 CV STRESS TEST SUPVJ ONLY: CPT | Performed by: INTERNAL MEDICINE

## 2019-11-05 PROCEDURE — 99285 EMERGENCY DEPT VISIT HI MDM: CPT

## 2019-11-05 PROCEDURE — 25010000002 REGADENOSON 0.4 MG/5ML SOLUTION: Performed by: INTERNAL MEDICINE

## 2019-11-05 RX ORDER — SODIUM CHLORIDE 0.9 % (FLUSH) 0.9 %
10 SYRINGE (ML) INJECTION AS NEEDED
Status: DISCONTINUED | OUTPATIENT
Start: 2019-11-05 | End: 2019-11-07 | Stop reason: HOSPADM

## 2019-11-05 RX ORDER — HYDRALAZINE HYDROCHLORIDE 10 MG/1
10 TABLET, FILM COATED ORAL 2 TIMES DAILY
Status: DISCONTINUED | OUTPATIENT
Start: 2019-11-05 | End: 2019-11-07 | Stop reason: HOSPADM

## 2019-11-05 RX ORDER — PRAMIPEXOLE DIHYDROCHLORIDE 0.25 MG/1
0.25 TABLET ORAL 2 TIMES DAILY PRN
Status: DISCONTINUED | OUTPATIENT
Start: 2019-11-05 | End: 2019-11-07 | Stop reason: HOSPADM

## 2019-11-05 RX ORDER — PRAMIPEXOLE DIHYDROCHLORIDE 0.25 MG/1
0.25 TABLET ORAL 2 TIMES DAILY PRN
COMMUNITY
End: 2020-11-16 | Stop reason: SDUPTHER

## 2019-11-05 RX ORDER — TRAZODONE HYDROCHLORIDE 50 MG/1
50 TABLET ORAL NIGHTLY
Status: DISCONTINUED | OUTPATIENT
Start: 2019-11-05 | End: 2019-11-07 | Stop reason: HOSPADM

## 2019-11-05 RX ORDER — GABAPENTIN 300 MG/1
300 CAPSULE ORAL NIGHTLY
Status: DISCONTINUED | OUTPATIENT
Start: 2019-11-05 | End: 2019-11-07 | Stop reason: HOSPADM

## 2019-11-05 RX ORDER — ACETAMINOPHEN 650 MG/1
650 SUPPOSITORY RECTAL EVERY 4 HOURS PRN
Status: DISCONTINUED | OUTPATIENT
Start: 2019-11-05 | End: 2019-11-07 | Stop reason: HOSPADM

## 2019-11-05 RX ORDER — POTASSIUM CHLORIDE 750 MG/1
20 CAPSULE, EXTENDED RELEASE ORAL 2 TIMES DAILY
Status: DISCONTINUED | OUTPATIENT
Start: 2019-11-05 | End: 2019-11-07 | Stop reason: HOSPADM

## 2019-11-05 RX ORDER — ACETAMINOPHEN 325 MG/1
650 TABLET ORAL EVERY 4 HOURS PRN
Status: DISCONTINUED | OUTPATIENT
Start: 2019-11-05 | End: 2019-11-07 | Stop reason: HOSPADM

## 2019-11-05 RX ORDER — DEXTROSE MONOHYDRATE 25 G/50ML
25 INJECTION, SOLUTION INTRAVENOUS
Status: DISCONTINUED | OUTPATIENT
Start: 2019-11-05 | End: 2019-11-07 | Stop reason: HOSPADM

## 2019-11-05 RX ORDER — SODIUM CHLORIDE 0.9 % (FLUSH) 0.9 %
10 SYRINGE (ML) INJECTION EVERY 12 HOURS SCHEDULED
Status: DISCONTINUED | OUTPATIENT
Start: 2019-11-05 | End: 2019-11-07 | Stop reason: HOSPADM

## 2019-11-05 RX ORDER — NITROGLYCERIN 0.4 MG/1
0.4 TABLET SUBLINGUAL
Status: DISCONTINUED | OUTPATIENT
Start: 2019-11-05 | End: 2019-11-07 | Stop reason: HOSPADM

## 2019-11-05 RX ORDER — ACETAMINOPHEN 160 MG/5ML
650 SOLUTION ORAL EVERY 4 HOURS PRN
Status: DISCONTINUED | OUTPATIENT
Start: 2019-11-05 | End: 2019-11-07 | Stop reason: HOSPADM

## 2019-11-05 RX ORDER — NICOTINE POLACRILEX 4 MG
15 LOZENGE BUCCAL
Status: DISCONTINUED | OUTPATIENT
Start: 2019-11-05 | End: 2019-11-07 | Stop reason: HOSPADM

## 2019-11-05 RX ORDER — ECHINACEA PURPUREA EXTRACT 125 MG
1 TABLET ORAL AS NEEDED
Status: DISCONTINUED | OUTPATIENT
Start: 2019-11-05 | End: 2019-11-07 | Stop reason: HOSPADM

## 2019-11-05 RX ORDER — FUROSEMIDE 40 MG/1
40 TABLET ORAL 2 TIMES DAILY
Status: DISCONTINUED | OUTPATIENT
Start: 2019-11-05 | End: 2019-11-07 | Stop reason: HOSPADM

## 2019-11-05 RX ADMIN — TECHNETIUM TC 99M SESTAMIBI 1 DOSE: 1 INJECTION INTRAVENOUS at 10:35

## 2019-11-05 RX ADMIN — FUROSEMIDE 40 MG: 40 TABLET ORAL at 20:38

## 2019-11-05 RX ADMIN — POTASSIUM CHLORIDE 20 MEQ: 750 CAPSULE, EXTENDED RELEASE ORAL at 20:38

## 2019-11-05 RX ADMIN — TRAZODONE HYDROCHLORIDE 50 MG: 50 TABLET ORAL at 20:38

## 2019-11-05 RX ADMIN — GABAPENTIN 300 MG: 300 CAPSULE ORAL at 20:38

## 2019-11-05 RX ADMIN — NITROGLYCERIN 1 INCH: 20 OINTMENT TOPICAL at 03:45

## 2019-11-05 RX ADMIN — SODIUM CHLORIDE, PRESERVATIVE FREE 10 ML: 5 INJECTION INTRAVENOUS at 16:49

## 2019-11-05 RX ADMIN — HYDRALAZINE HYDROCHLORIDE 10 MG: 10 TABLET, FILM COATED ORAL at 16:48

## 2019-11-05 RX ADMIN — REGADENOSON 0.4 MG: 0.08 INJECTION, SOLUTION INTRAVENOUS at 14:15

## 2019-11-05 RX ADMIN — HYDRALAZINE HYDROCHLORIDE 10 MG: 10 TABLET, FILM COATED ORAL at 20:38

## 2019-11-05 RX ADMIN — METOPROLOL TARTRATE 12.5 MG: 25 TABLET ORAL at 20:38

## 2019-11-05 RX ADMIN — SODIUM CHLORIDE, PRESERVATIVE FREE 10 ML: 5 INJECTION INTRAVENOUS at 20:40

## 2019-11-05 RX ADMIN — TECHNETIUM TC 99M SESTAMIBI 1 DOSE: 1 INJECTION INTRAVENOUS at 14:15

## 2019-11-05 RX ADMIN — FUROSEMIDE 40 MG: 40 TABLET ORAL at 16:48

## 2019-11-05 NOTE — PROGRESS NOTES
Dilip Verma is a 69 y.o. male s/p CABG. He denies any signs or symptoms of myocardial ischemia, cerebrovascular event, or infection. He reports good exercise tolerance.  Interestingly, since his surgery he has noted a slight problem with swallowing.    Sternum is stable, incision is clean, dry, and intact.   CTA B/L.   Lower extremity incisions are clean, dry and intact.  GCS 15, no neurologic deficit.    He appears to be doing well.  He should continue to practice sternal precautions until December 2019.      I do think we should investigate his minor/mild dysphagia.  We will refer him to speech therapy for an evaluation.      Follow up with us will be on a PRN basis.

## 2019-11-05 NOTE — ED TRIAGE NOTES
Sudden sharp midsternal chest pains that began around 0200. Pt denies pain at this time. Pt has significant cardiac history.

## 2019-11-05 NOTE — H&P
Patient Name: Dilip Verma  :1950  69 y.o.    Date of Admission: 2019  Encounter Provider: Michaela Hylton MD  Date of Encounter Visit: 19  Place of Service: [facility] CARDIOLOGY  Referring Provider: Michaela Hlyton MD  Patient Care Team:  Vishal Adrian Jr., MD as PCP - General (Internal Medicine)  Morris Cai MD as Consulting Physician (Sleep Medicine)  Michaela Hylton MD as Consulting Physician (Cardiology)  Hardy Banerjee MD as Consulting Physician (Ophthalmology)  Chula Christianson MD as Consulting Physician (Ophthalmology)  Jason Sherman MD (Endocrinology)  Perla Mayen MD as Consulting Physician (Nephrology)      Chief complaint: Chest Pain      History of Present Illness: Dilip Verma is a  69-year-old male with a history of hypertension, chronic diastolic ehart failure, hyperlipidemia, COCO, and diabetes. He had been admitted to [facility] in early 2019 for syncope. Hew as orthostatic with supine hypertesnion, so clonidine was discontinued and hydralazine was increased. He then presented to [facility] on 2019 with uncontrolled BP and blurry vision. He was found to have an abnormal BNP and Troponin. Echo from 19 noted an EF of 53%, moderate LVH, mild to moderate left atrial enlargement, and no significant valvular heart disease. He also had a Holter that showed some nonsustained atrial tachycardia. He underwent cardiac catheterization on 7/15/19 due to his elevated troponin. This revealed a normal ejection fraction with severe multivessel coronary artery disease, and was referred for CABG with cardiothoracic surgery. Echo from 19 noted and EF of 55%, grade III LV diastolic dysfunction, mild tricuspid regurgitation, and a markedly elevated RVSP of 82 mmHg. He subsequently underwent surgery on 19 with a CLARKE to LAD, vein graft to the OM1, and vein graft to the distal RCA. Post-op, he had some  bradycardia and his beta-blocker was held. Additionally, he also had a brief episode of post-operative Afib and was discharged on amiodarone. He was last seen in the office on 9/11/2019 by LEIDY Hirsch, with complaints of weight gain despite an improvement in lower extremity edema. He had additional complaints of anxiety and night terrors, difficulty tolerating nightly BiPAP, as well as some increasing chronic SOA with exertion. It is noted that he patient has a history of anxiety and PTSD, and it was recommended that he follow up with psychiatry. He denied any chest pain or palpitations at that time and his EKG showed him to be in sinus rhythm. The patient was given instructions on when to take extra Lasix and potassium with weight gain of more than 2 pounds in one day or more than 5 pounds in a week. His AST was also noted to be mildly elevated, but no changes were made to atorvastatin at that time and he would need careful monitoring of liver enzymes and cholesterol.     He had an episode of chest pain which was sharp like he was being stabbed with something in his left chest.  It began about 2 in the morning while watching TV.  The pain did not radiate.  He denies shortness of breath or nausea.  They called EMS.  He is brought to the emergency room and Nitropaste was placed.  His EKG was abnormal but unchanged and he had a slightly elevated troponin at 0.052 which came back to 0.048.  He has a history of a chronically elevated troponin.  He is currently pain-free.      Previous Testing:  Echo 7/16/2019  · Left ventricular wall thickness is consistent with moderate concentric hypertrophy  · Left ventricular systolic function is normal. Calculated EF = 55.0%.  · Left ventricular diastolic dysfunction is noted (grade III w/high LAP) consistent with fixed restricive pattern  · Normal right ventricular cavity size and systolic function noted.  · Left atrial cavity size is mildly dilated.  · Mild tricuspid valve  regurgitation is present.  · Estimated right ventricular systolic pressure from tricuspid regurgitation is markedly elevated (>55 mmHg). Calculated right ventricular systolic pressure from tricuspid regurgitation is 82 mmHg.  · There is no evidence of pericardial effusion.    Cardiac Catheterization 7/15/2019  CORONARY ANGIOGRAPHY:   1.  Left main: Short vessel with 0% stenosis.  The vessel bifurcates into left anterior descending and left circumflex arteries.  2.  Left anterior descending artery: Severe proximal calcification.  Proximal vessel has 0% stenosis and gives rise to a large first diagonal branch with 10 to 20% scattered stenosis.  Mid LAD after the origin of the first diagonal branch is severely calcified with a diffuse 70% stenosis.  Remainder of the mid to distal LAD has 0% stenosis.  3.  Left circumflex artery: Moderate caliber vessel with 70% proximal and 80% mid stenosis.  The mid vessel gives rise to a large first obtuse marginal branch with 90% mid stenosis.  The distal continuation of the circumflex vessel tapers to a small vessel and appears to terminate in the AV groove.  4.  Right coronary artery: 10% proximal stenosis.  70% mid stenosis.  Distal vessel has 30% stenosis and bifurcates into a large posterior descending and posterior lateral branches both with 0% stenosis.  This is a right dominant system.     LEFT VENTRICULAR HEMODYNAMICS:    1.  Left ventricular pressure: 139/18  2.  Aortic pressure: 132/67     LEFT VENTRICULOGRAPHY:   Normal left ventricular systolic function and wall motion with an estimated ejection fraction of 60%.     POST-PROCEDURE DIAGNOSIS:   1. Severe multivessel coronary artery disease     RECOMMENDATIONS:   Consult CT surgery regarding CABG.       Past Medical History:   Diagnosis Date   • Acute diastolic (congestive) heart failure (CMS/HCC)    • Anxiety    • Chest pain    • Chronic kidney disease     stones- had lithotripsy   • Colon polyp    • Coronary artery  "disease     CABG 7/2019   • Diabetes mellitus, type II (CMS/[facility])    • Erectile dysfunction    • H/O bone density study never   • Hyperlipidemia    • Hypersomnia    • Hypertension    • Kidney stone    • NSTEMI (non-ST elevated myocardial infarction) (CMS/[facility])    • Orthostasis    • Periodic breathing    • Routine eye exam 2015   • Sleep apnea     bipap   • Stroke (CMS/HCC)     \"slight stroke\"       Past Surgical History:   Procedure Laterality Date   • CARDIAC CATHETERIZATION N/A 7/15/2019    Procedure: Coronary angiography;  Surgeon: Carrie Price MD;  Location:  [facility] CATH INVASIVE LOCATION;  Service: Cardiovascular   • CARDIAC CATHETERIZATION N/A 7/15/2019    Procedure: Left Heart Cath;  Surgeon: Carrie Price MD;  [facility];  Service: Cardiovascular   • CARDIAC CATHETERIZATION N/A 7/15/2019    Procedure: Left ventriculography;  Surgeon: Carrie Price MD;  [facility];  Service: Cardiovascular   • CARDIAC CATHETERIZATION  7/15/2019    Procedure: Functional Flow Gibson;  Surgeon: Carrie Price MD;  [facility];  Service: Cardiovascular   • CATARACT EXTRACTION EXTRACAPSULAR W/ INTRAOCULAR LENS IMPLANTATION Bilateral    • COLONOSCOPY  01/2015    dr [email or web address]   • CORONARY ARTERY BYPASS GRAFT N/A 7/18/2019    Procedure: INTRAOPERATIVE SHOAIB; STERNOTOMY CORONARY ARTERY BYPASS x 3  USING LEFT INTERNAL MAMMARY ARTERY GRAFT UTILIZING ENDOSCOPICALLY HARVESTED RIGHT GREATER SAPHENOUS VEIN AND PRP.;  Surgeon: Bill Devi MD;  Location: [facility] OR;  Service: Cardiothoracic   • DENTAL PROCEDURE      3 surgeries inder implants   • HERNIA REPAIR      inguinal bilaterally   • KIDNEY STONE SURGERY      lithotripsy         Prior to Admission medications    Medication Sig Start Date End Date Taking? Authorizing Provider   aspirin  MG EC tablet Take 1 tablet by mouth Daily. 7/26/19  Yes Marian Boateng APRN   Cholecalciferol " (VITAMIN D PO) Take 5,000 Units by mouth Daily.   Yes Heri Rinaldi MD   furosemide (LASIX) 40 MG tablet Take 1 tablet by mouth 2 (Two) Times a Day. 9/17/19  Yes Michaela Hylton MD   gabapentin (NEURONTIN) 300 MG capsule Take 1 capsule by mouth Every Night. 9/18/19  Yes Jami Salvador APRN   hydrALAZINE (APRESOLINE) 10 MG tablet Take 1 tablet by mouth 2 (Two) Times a Day. 10/21/19  Yes Vishal Adrian Jr., MD   insulin degludec (TRESIBA FLEXTOUCH) 100 UNIT/ML solution pen-injector injection Inject 25 Units under the skin into the appropriate area as directed Daily With Dinner.   Yes Heri Rinaldi MD   insulin lispro (HUMALOG) 100 UNIT/ML injection Inject  under the skin into the appropriate area as directed 3 (Three) Times a Day Before Meals. PER SS -  Currently taking 4 units at noon and 7 units in pm plus 2 units for -250, 4 units for -300, 6 units for -350   Yes Heri Rinaldi MD   melatonin 3 MG tablet Take 10 mg by mouth Every Night.   Yes Heri Rinaldi MD   metoprolol tartrate (LOPRESSOR) 25 MG tablet Take 0.5 tablets by mouth Every 12 (Twelve) Hours. 9/24/19  Yes Michaela Hylton MD   potassium chloride (K-DUR,KLOR-CON) 20 MEQ CR tablet Take 2 tablets by mouth 2 (Two) Times a Day. 9/17/19  Yes Michaela Hylton MD   pramipexole (MIRAPEX) 0.25 MG tablet Take 0.25 mg by mouth 2 (Two) Times a Day As Needed (restless leg).   Yes Heri Rinaldi MD   tamsulosin (FLOMAX) 0.4 MG capsule 24 hr capsule Take 1 capsule by mouth 2 (Two) Times a Day. Wife states he takes it when he has trouble voiding, Also taking for high blood pressure. 6/5/16  Yes Heri Rinaldi MD   traZODone (DESYREL) 50 MG tablet Take 1 tablet by mouth Every Night. 10/21/19  Yes Vishal Adrian Jr., MD       Allergies   Allergen Reactions   • Ativan [Lorazepam] Irritability   • Ace Inhibitors Angioedema   • Adhesive Tape Other (See Comments)     Ripped the skin off    •  Losartan Angioedema     Angioneurotic edema   • Minoxidil Unknown (See Comments)   • Penicillin G Unknown (See Comments)   • Sulfa Antibiotics Other (See Comments)     Blisters in mouth   • Tetracycline Other (See Comments)     bliisters in mouth         Social History     Socioeconomic History   • Marital status:      Spouse name: Not on file   • Number of children: Not on file   • Years of education: Not on file   • Highest education level: Not on file   Tobacco Use   • Smoking status: Former Smoker     Packs/day: 0.75   • Smokeless tobacco: Never Used   • Tobacco comment: Start age:39/Stopping age:47, 3/4 PPD   Substance and Sexual Activity   • Alcohol use: No   • Drug use: No   • Sexual activity: Defer       Family History   Problem Relation Age of Onset   • Depression Mother    • Cancer Mother         uterine   • Heart disease Father    • Hypertension Father    • Kidney disease Father    • Thyroid disease Father    • Depression Sister    • Alcohol abuse Brother    • Alcohol abuse Other    • Alcohol abuse Other    • Lung disease Other    • Thyroid disease Other    • Glaucoma Other    • Heart attack Other        REVIEW OF SYSTEMS:   All other systems reviewed and negative.        Objective:     Vitals:    11/05/19 0455 11/05/19 0527 11/05/19 0611 11/05/19 0805   BP: 171/94 172/93 159/81 102/57   BP Location:   Left arm Left arm   Patient Position:   Sitting Sitting   Pulse:  75 75 67   Resp:   18 18   Temp:   97.2 °F (36.2 °C) 97.6 °F (36.4 °C)   TempSrc:   Oral Oral   SpO2: 94% 95% 96% 93%   Weight:   87.4 kg (192 lb 11.2 oz)    Height:         Body mass index is 28.46 kg/m².    Intake/Output Summary (Last 24 hours) at 11/5/2019 1258  Last data filed at 11/5/2019 1151  Gross per 24 hour   Intake 0 ml   Output 300 ml   Net -300 ml       Constitutional: He is oriented to person, place, and time. He appears well-developed. He does not appear ill.   HENT:   Head: Normocephalic and atraumatic. Head is without  contusion.   Right Ear: Hearing normal. No drainage.   Left Ear: Hearing normal. No drainage.   Nose: No nasal deformity. No epistaxis.   Eyes: Lids are normal. Right eye exhibits no exudate. Left eye exhibits no exudate.  Neck: No JVD present. Carotid bruit is not present. No tracheal deviation present. No thyroid mass and no thyromegaly present.   Cardiovascular: Normal rate, regular rhythm and normal heart sounds.    Pulses:       Posterior tibial pulses are 2+ on the right side, and 2+ on the left side.   Pulmonary/Chest: Effort normal and breath sounds normal.   Abdominal: Soft. Normal appearance and bowel sounds are normal. There is no tenderness.   Musculoskeletal: Normal range of motion.        Right shoulder: He exhibits no deformity.        Left shoulder: He exhibits no deformity.   Neurological: He is alert and oriented to person, place, and time. He has normal strength.   Skin: Skin is warm, dry and intact. No rash noted.   Psychiatric: He has a normal mood and affect. His behavior is normal. Thought content normal.   Vitals reviewed      Lab Review:     Results from last 7 days   Lab Units 11/05/19  0342   SODIUM mmol/L 142   POTASSIUM mmol/L 4.8   CHLORIDE mmol/L 102   CO2 mmol/L 30.0*   BUN mg/dL 32*   CREATININE mg/dL 1.13   CALCIUM mg/dL 9.2   BILIRUBIN mg/dL 0.4   ALK PHOS U/L 78   ALT (SGPT) U/L 21   AST (SGOT) U/L 15   GLUCOSE mg/dL 295*     Results from last 7 days   Lab Units 11/05/19  0548 11/05/19  0342   TROPONIN T ng/mL 0.048* 0.052*     Results from last 7 days   Lab Units 11/05/19  0342   WBC 10*3/mm3 7.83   HEMOGLOBIN g/dL 13.2   HEMATOCRIT % 41.6   PLATELETS 10*3/mm3 215                   EKG      Previous EKG        I personally viewed and interpreted the patient's EKG/Telemetry data.        Assessment and Plan:       1.  Chest pain.  Abnormal baseline EKG is unchanged.  He has a chronically elevated troponin.  His pain sounds atypical.  I am going to check an echocardiogram and a  Lexiscan nuclear stress test.  If those are normal, he can be discharged home today.  2.  History of coronary artery disease status post bypass surgery x3 in July 2019.  3.  Paroxysm of atrial fibrillation post bypass.  4.  Chronic diastolic heart failure.  Appears euvolemic.  5.  Hypertension.  He follows this with his nephrologist and has an appointment next week.  His wife's been taking his blood pressure but at erratic times.  Sometimes it is before his medication and sometimes is after.  His blood pressure numbers look all over the place.  I have encouraged her to check his blood pressure at least an hour after he has his medications and to take those numbers to the nephrologist next week.  6.  Obstructive sleep apnea.  He says he is not tolerating his PAP therapy.  I encouraged him to follow-up with Dr. Cai as an outpatient.  7.  Psychiatric issues/anxiety/PTSD/night terrors.  He was apparently told he should follow with 1 of the psychiatrists here.  His wife is asking me about using ketamine and all bunch of stuff I do not know anything about.  I will see if [place] can come see him to set up some outpatient follow-up.  8.  Mild bilateral carotid artery disease    His cardiac testing is unremarkable, I will have him see me back for his usually scheduled appointment in August 2020.    Michaela Hylton MD  11/05/19  12:58 PM

## 2019-11-05 NOTE — ED PROVIDER NOTES
MD ATTESTATION NOTE    Pt presents to the ED complaining of sharp chest pain that began around 0200 this morning and has resolved. He reports current left neck and shoulder pain, but he believes this pain is not new, and he attributes it to spinal stenosis. Pt reports hx of 3 vessel bypass surgery, and his cardiologist is Dr. Hylton. His last bypass was July 18, 2019.     On exam, the pt is awake, alert, and oriented. NAD. Healing mid sternal surgical scar with no problems. Chest is non-tender. Abdomen is non-tender. Extremities are unremarkable. No focal neurologic deficits.     I agree with the plan to review labs and imaging.        The GUZMAN and I have discussed this patient's history, physical exam, and treatment plan.  I have reviewed the documentation and personally had a face to face interaction with the patient. I affirm the documentation and agree with the treatment and plan.  The attached note describes my personal findings.    Documentation assistance provided by elan Donahue for Dr. Seema MD.  Information recorded by the scribe was done at my direction and has been verified and validated by me.     Tasha Donahue  11/05/19 0350       Fernando Stephenson MD  11/05/19 0633

## 2019-11-05 NOTE — PLAN OF CARE
Problem: Patient Care Overview  Goal: Plan of Care Review  Outcome: Ongoing (interventions implemented as appropriate)   11/05/19 0719   Coping/Psychosocial   Plan of Care Reviewed With patient   Plan of Care Review   Progress no change   OTHER   Outcome Summary Pt came in with a NSTEMI. pt denies c/o chest pain. NTG paste in place. Standby assist. 2 L O2 nc. VSS. awaiting dr orders. will continue to monitor.      Goal: Individualization and Mutuality  Outcome: Ongoing (interventions implemented as appropriate)    Goal: Discharge Needs Assessment  Outcome: Ongoing (interventions implemented as appropriate)    Goal: Interprofessional Rounds/Family Conf  Outcome: Ongoing (interventions implemented as appropriate)

## 2019-11-05 NOTE — ED PROVIDER NOTES
EMERGENCY DEPARTMENT ENCOUNTER    CHIEF COMPLAINT  Chief Complaint: Chest Pain   History given by: Patient   History limited by: none   Room Number: 19/19  PMD: Vishal Adrian Jr., MD      HPI:  Pt is a 69 y.o. male who presents complaining of intermittent episodes of sharp, mid-sternal chest pain that began at 0200 while sitting in a chair. Pt states episodes lasted for several seconds, but overall only last for several minutes. Pt denies SOA, nausea, or any radiation of pain. Per spouse, pt has hx of MI that required 3 vessel CABG, but denies any pain prior to or following surgery. Pt states pain has since resolved.     Duration:  Several seconds   Onset: gradual   Timing: intermittent   Location: mid-sternal chest   Radiation: none   Quality: pain   Intensity/Severity: moderate   Progression: resolved  Associated Symptoms: none   Aggravating Factors: none   Alleviating Factors: none   Previous Episodes: Pt has hx of MI.   Treatment before arrival: none     PAST MEDICAL HISTORY  Active Ambulatory Problems     Diagnosis Date Noted   • Anxiety    • Colon polyp    • Diabetes mellitus, type II (CMS/[facility])    • Erectile dysfunction    • Hyperlipidemia    • Essential hypertension    • Obstructive sleep apnea on autoBiPAP    • NSTEMI (non-ST elevated myocardial infarction) (CMS/[facility]) 07/12/2019   • CAD (coronary artery disease) 07/20/2019   • Hypertensive urgency 07/20/2019   • Acute diastolic (congestive) heart failure (CMS/[facility]) 07/20/2019   • Orthostasis 07/20/2019   • Hx of CABG 08/19/2019   • Chronic diastolic CHF (congestive heart failure) (CMS/[facility]) 09/11/2019   • Hypersomnia due to medical condition 09/14/2019   • CSA (central sleep apnea) 09/14/2019   • Periodic breathing 09/14/2019     Resolved Ambulatory Problems     Diagnosis Date Noted   • No Resolved Ambulatory Problems     Past Medical History:   Diagnosis Date   • Acute diastolic (congestive) heart failure (CMS/[facility])    • Anxiety    • Chest pain    •  Chronic kidney disease    • Colon polyp    • Coronary artery disease    • Diabetes mellitus, type II (CMS/[facility])    • Erectile dysfunction    • H/O bone density study never   • Hyperlipidemia    • Hypersomnia    • Hypertension    • Kidney stone    • NSTEMI (non-ST elevated myocardial infarction) (CMS/[facility])    • Orthostasis    • Periodic breathing    • Routine eye exam 2015   • Sleep apnea    • Stroke (CMS/[facility])        PAST SURGICAL HISTORY  Past Surgical History:   Procedure Laterality Date   • CARDIAC CATHETERIZATION N/A 7/15/2019    Procedure: Coronary angiography;  Surgeon: Carrie Price MD;  Location:  [facility] CATH INVASIVE LOCATION;  Service: Cardiovascular   • CARDIAC CATHETERIZATION N/A 7/15/2019    Procedure: Left Heart Cath;  Surgeon: Carrie Price MD;  [facility];  Service: Cardiovascular   • CARDIAC CATHETERIZATION N/A 7/15/2019    Procedure: Left ventriculography;  Surgeon: Carrie Price MD;  [facility];  Service: Cardiovascular   • CARDIAC CATHETERIZATION  7/15/2019    Procedure: Functional Flow [place];  Surgeon: Carrie Price MD;  [facility];  Service: Cardiovascular   • CATARACT EXTRACTION EXTRACAPSULAR W/ INTRAOCULAR LENS IMPLANTATION Bilateral    • COLONOSCOPY  01/2015    dr [email or web address]   • CORONARY ARTERY BYPASS GRAFT N/A 7/18/2019    Procedure: INTRAOPERATIVE SHOAIB; STERNOTOMY CORONARY ARTERY BYPASS x 3  USING LEFT INTERNAL MAMMARY ARTERY GRAFT UTILIZING ENDOSCOPICALLY HARVESTED RIGHT GREATER SAPHENOUS VEIN AND PRP.;  Surgeon: Bill Devi MD;  Location: [facility] MAIN OR;  Service: Cardiothoracic   • DENTAL PROCEDURE      3 surgeries inder implants   • HERNIA REPAIR      inguinal bilaterally   • KIDNEY STONE SURGERY      lithotripsy       FAMILY HISTORY  Family History   Problem Relation Age of Onset   • Depression Mother    • Cancer Mother         uterine   • Heart disease Father    • Hypertension Father    •  Kidney disease Father    • Thyroid disease Father    • Depression Sister    • Alcohol abuse Brother    • Alcohol abuse Other    • Alcohol abuse Other    • Lung disease Other    • Thyroid disease Other    • Glaucoma Other    • Heart attack Other        SOCIAL HISTORY  Social History     Socioeconomic History   • Marital status:      Spouse name: Not on file   • Number of children: Not on file   • Years of education: Not on file   • Highest education level: Not on file   Tobacco Use   • Smoking status: Former Smoker     Packs/day: 0.75   • Smokeless tobacco: Never Used   • Tobacco comment: Start age:39/Stopping age:47, 3/4 PPD   Substance and Sexual Activity   • Alcohol use: No   • Drug use: No       ALLERGIES  Ativan [lorazepam]; Ace inhibitors; Adhesive tape; Losartan; Minoxidil; Penicillin g; Sulfa antibiotics; and Tetracycline    REVIEW OF SYSTEMS  Review of Systems   Constitutional: Negative for activity change, appetite change and fever.   HENT: Negative for congestion and sore throat.    Eyes: Negative.    Respiratory: Negative for cough and shortness of breath.    Cardiovascular: Positive for chest pain. Negative for leg swelling.   Gastrointestinal: Negative for abdominal pain, diarrhea and vomiting.   Genitourinary: Negative for decreased urine volume and dysuria.   Musculoskeletal: Negative for neck pain.   Skin: Negative for rash and wound.   Neurological: Negative for weakness, numbness and headaches.   Psychiatric/Behavioral: Negative.    All other systems reviewed and are negative.      PHYSICAL EXAM  ED Triage Vitals   Temp Heart Rate Resp BP SpO2   11/05/19 0321 11/05/19 0321 11/05/19 0322 11/05/19 0321 11/05/19 0321   97.5 °F (36.4 °C) 85 16 (!) 184/78 93 %      Temp src Heart Rate Source Patient Position BP Location FiO2 (%)   11/05/19 0321 11/05/19 0321 -- -- --   Tympanic Monitor          Physical Exam   Constitutional: He is oriented to person, place, and time. No distress.   HENT:   Head:  Normocephalic and atraumatic.   Eyes: EOM are normal. Pupils are equal, round, and reactive to light.   Neck: Normal range of motion. Neck supple.   Cardiovascular: Normal rate, regular rhythm and normal heart sounds.   Pulmonary/Chest: Effort normal and breath sounds normal. No respiratory distress.   Abdominal: Soft. There is no tenderness. There is no rebound and no guarding.   Musculoskeletal: Normal range of motion. He exhibits no edema.   Neurological: He is alert and oriented to person, place, and time. He has normal sensation and normal strength.   Skin: Skin is warm and dry.   Psychiatric: Mood and affect normal.   Nursing note and vitals reviewed.      LAB RESULTS  Lab Results (last 24 hours)     Procedure Component Value Units Date/Time    CBC & Differential [[id number]] Collected:  11/05/19 0342    Specimen:  Blood Updated:  11/05/19 0353    Narrative:       The following orders were created for panel order CBC & Differential.  Procedure                               Abnormality         Status                     ---------                               -----------         ------                     CBC Auto Differential[[id number]]        Abnormal            Final result                 Please view results for these tests on the individual orders.    Comprehensive Metabolic Panel [[id number]]  (Abnormal) Collected:  11/05/19 0342    Specimen:  Blood Updated:  11/05/19 0417     Glucose 295 mg/dL      BUN 32 mg/dL      Creatinine 1.13 mg/dL      Sodium 142 mmol/L      Potassium 4.8 mmol/L      Chloride 102 mmol/L      CO2 30.0 mmol/L      Calcium 9.2 mg/dL      Total Protein 7.4 g/dL      Albumin 4.00 g/dL      ALT (SGPT) 21 U/L      AST (SGOT) 15 U/L      Alkaline Phosphatase 78 U/L      Total Bilirubin 0.4 mg/dL      eGFR Non African Amer 64 mL/min/1.73      Globulin 3.4 gm/dL      A/G Ratio 1.2 g/dL      BUN/Creatinine Ratio 28.3     Anion Gap 10.0 mmol/L     Narrative:       GFR Normal >60  Chronic Kidney  Disease <60  Kidney Failure <15    Troponin [[id number]]  (Abnormal) Collected:  11/05/19 0342    Specimen:  Blood Updated:  11/05/19 0418     Troponin T 0.052 ng/mL     Narrative:       Troponin T Reference Range:  <= 0.03 ng/mL-   Negative for AMI  >0.03 ng/mL-     Abnormal for myocardial necrosis.  Clinicians would have to utilize clinical acumen, EKG, Troponin and serial changes to determine if it is an Acute Myocardial Infarction or myocardial injury due to an underlying chronic condition.     CBC Auto Differential [[id number]]  (Abnormal) Collected:  11/05/19 0342    Specimen:  Blood Updated:  11/05/19 0353     WBC 7.83 10*3/mm3      RBC 4.59 10*6/mm3      Hemoglobin 13.2 g/dL      Hematocrit 41.6 %      MCV 90.6 fL      MCH 28.8 pg      MCHC 31.7 g/dL      RDW 15.9 %      RDW-SD 52.7 fl      MPV 9.9 fL      Platelets 215 10*3/mm3      Neutrophil % 63.8 %      Lymphocyte % 22.7 %      Monocyte % 8.4 %      Eosinophil % 4.2 %      Basophil % 0.5 %      Immature Grans % 0.4 %      Neutrophils, Absolute 4.99 10*3/mm3      Lymphocytes, Absolute 1.78 10*3/mm3      Monocytes, Absolute 0.66 10*3/mm3      Eosinophils, Absolute 0.33 10*3/mm3      Basophils, Absolute 0.04 10*3/mm3      Immature Grans, Absolute 0.03 10*3/mm3      nRBC 0.0 /100 WBC           I ordered the above labs and reviewed the results    RADIOLOGY  XR Chest 1 View   Final Result   Cardiomegaly and vascular congestion.       This report was finalized on 11/5/2019 4:12 AM by Dr. Gayle Okeefe M.D.               I ordered the above noted radiological studies. Interpreted by radiologist. Reviewed by me in PACS.       PROCEDURES  Procedures  EKG          EKG time: 0326  Rhythm/Rate: NSR 77  P waves and NM: L atrial enlargement  QRS, axis: incomplete RBBB with LVH   ST and T waves: widespread T wave abnormalities     Interpreted Contemporaneously by me, independently viewed  Unchanged compared to prior 8/19/19      PROGRESS AND CONSULTS     0340: Labs  and CXR ordered for further evaluation. NTG ordered for chest pain management.     0355: Upon pt exam, discussed pt's abnormal EKG, but pt states he is pain free at this time. Informed pt of plan for further cardiac workup in ED, with probable plan for admission due to pt's significant hx.     0420: Pt updated on elevated troponin and plan for admission as prior discussed. Pt continues to deny pain in the ED. Pt understands and agrees with plan, all questions addressed.     0503: Call placed to Cardiology.     0512 Discussed pt's case with Dr. Hylton (Cardiology) who agreed to admit pt to telemetry for further cardiac workup and treatment.     MEDICAL DECISION MAKING  Results were reviewed/discussed with the patient and they were also made aware of online access. Pt also made aware that some labs, such as cultures, will not be resulted during ER visit and follow up with PMD is necessary.     MDM  Number of Diagnoses or Management Options     Amount and/or Complexity of Data Reviewed  Clinical lab tests: ordered and reviewed (Troponin - 0.052)  Tests in the radiology section of CPT®: ordered and reviewed (CXR- cardiomegaly and vascular congestion )  Tests in the medicine section of CPT®: ordered and reviewed (See MD note)  Review and summarize past medical records: yes (8/2019 - 3 vessel CABG)           DIAGNOSIS  Final diagnoses:   NSTEMI (non-ST elevated myocardial infarction) (CMS/[facility])       DISPOSITION  ADMISSION    Discussed treatment plan and reason for admission with pt/family and admitting physician.  Pt/family voiced understanding of the plan for admission for further testing/treatment as needed.       Latest Documented Vital Signs:  As of 5:01 AM  BP- (!) 171/103 HR- 78 Temp- 97.5 °F (36.4 °C) (Tympanic) O2 sat- 95%    --  Documentation assistance provided by elan Blunt for Tyler Owens PA-C.  Information recorded by the elan was done at my direction and has been verified and validated by me.             Hubert Johnson  11/05/19 0504       Tyler Owens PA  11/05/19 0514

## 2019-11-06 LAB
GLUCOSE BLDC GLUCOMTR-MCNC: 116 MG/DL (ref 70–130)
GLUCOSE BLDC GLUCOMTR-MCNC: 116 MG/DL (ref 70–130)
GLUCOSE BLDC GLUCOMTR-MCNC: 134 MG/DL (ref 70–130)
GLUCOSE BLDC GLUCOMTR-MCNC: 188 MG/DL (ref 70–130)
HCT VFR BLDA CALC: 37 % (ref 38–51)
HCT VFR BLDA CALC: 42 % (ref 38–51)
HGB BLDA-MCNC: 12.6 G/DL (ref 12–17)
HGB BLDA-MCNC: 14.3 G/DL (ref 12–17)
SAO2 % BLDA: 66 % (ref 95–98)
SAO2 % BLDA: 96 % (ref 95–98)

## 2019-11-06 PROCEDURE — C1894 INTRO/SHEATH, NON-LASER: HCPCS | Performed by: INTERNAL MEDICINE

## 2019-11-06 PROCEDURE — G0378 HOSPITAL OBSERVATION PER HR: HCPCS

## 2019-11-06 PROCEDURE — 93461 R&L HRT ART/VENTRICLE ANGIO: CPT | Performed by: INTERNAL MEDICINE

## 2019-11-06 PROCEDURE — 25010000002 HEPARIN (PORCINE) PER 1000 UNITS: Performed by: INTERNAL MEDICINE

## 2019-11-06 PROCEDURE — 25010000002 FENTANYL CITRATE (PF) 100 MCG/2ML SOLUTION: Performed by: INTERNAL MEDICINE

## 2019-11-06 PROCEDURE — 85018 HEMOGLOBIN: CPT

## 2019-11-06 PROCEDURE — 85014 HEMATOCRIT: CPT

## 2019-11-06 PROCEDURE — 99152 MOD SED SAME PHYS/QHP 5/>YRS: CPT | Performed by: INTERNAL MEDICINE

## 2019-11-06 PROCEDURE — 25010000002 FUROSEMIDE PER 20 MG: Performed by: INTERNAL MEDICINE

## 2019-11-06 PROCEDURE — C1769 GUIDE WIRE: HCPCS | Performed by: INTERNAL MEDICINE

## 2019-11-06 PROCEDURE — 82962 GLUCOSE BLOOD TEST: CPT

## 2019-11-06 PROCEDURE — 25010000002 MIDAZOLAM PER 1 MG: Performed by: INTERNAL MEDICINE

## 2019-11-06 PROCEDURE — 0 IOPAMIDOL PER 1 ML: Performed by: INTERNAL MEDICINE

## 2019-11-06 RX ORDER — SODIUM CHLORIDE 9 MG/ML
INJECTION, SOLUTION INTRAVENOUS CONTINUOUS PRN
Status: COMPLETED | OUTPATIENT
Start: 2019-11-06 | End: 2019-11-06

## 2019-11-06 RX ORDER — LIDOCAINE HYDROCHLORIDE 20 MG/ML
INJECTION, SOLUTION INFILTRATION; PERINEURAL AS NEEDED
Status: DISCONTINUED | OUTPATIENT
Start: 2019-11-06 | End: 2019-11-06 | Stop reason: HOSPADM

## 2019-11-06 RX ORDER — MIDAZOLAM HYDROCHLORIDE 1 MG/ML
INJECTION INTRAMUSCULAR; INTRAVENOUS AS NEEDED
Status: DISCONTINUED | OUTPATIENT
Start: 2019-11-06 | End: 2019-11-06 | Stop reason: HOSPADM

## 2019-11-06 RX ORDER — FUROSEMIDE 10 MG/ML
40 INJECTION INTRAMUSCULAR; INTRAVENOUS EVERY 6 HOURS
Status: ACTIVE | OUTPATIENT
Start: 2019-11-06 | End: 2019-11-07

## 2019-11-06 RX ORDER — FENTANYL CITRATE 50 UG/ML
INJECTION, SOLUTION INTRAMUSCULAR; INTRAVENOUS AS NEEDED
Status: DISCONTINUED | OUTPATIENT
Start: 2019-11-06 | End: 2019-11-06 | Stop reason: HOSPADM

## 2019-11-06 RX ADMIN — FUROSEMIDE 40 MG: 40 TABLET ORAL at 20:48

## 2019-11-06 RX ADMIN — FUROSEMIDE 40 MG: 10 INJECTION, SOLUTION INTRAMUSCULAR; INTRAVENOUS at 17:45

## 2019-11-06 RX ADMIN — GABAPENTIN 300 MG: 300 CAPSULE ORAL at 20:49

## 2019-11-06 RX ADMIN — POTASSIUM CHLORIDE 20 MEQ: 750 CAPSULE, EXTENDED RELEASE ORAL at 08:00

## 2019-11-06 RX ADMIN — FUROSEMIDE 40 MG: 40 TABLET ORAL at 08:00

## 2019-11-06 RX ADMIN — TRAZODONE HYDROCHLORIDE 50 MG: 50 TABLET ORAL at 20:48

## 2019-11-06 RX ADMIN — POTASSIUM CHLORIDE 20 MEQ: 750 CAPSULE, EXTENDED RELEASE ORAL at 20:48

## 2019-11-06 RX ADMIN — METOPROLOL TARTRATE 12.5 MG: 25 TABLET ORAL at 08:00

## 2019-11-06 RX ADMIN — SODIUM CHLORIDE, PRESERVATIVE FREE 10 ML: 5 INJECTION INTRAVENOUS at 20:50

## 2019-11-06 RX ADMIN — HYDRALAZINE HYDROCHLORIDE 10 MG: 10 TABLET, FILM COATED ORAL at 08:00

## 2019-11-06 RX ADMIN — HYDRALAZINE HYDROCHLORIDE 10 MG: 10 TABLET, FILM COATED ORAL at 20:48

## 2019-11-06 RX ADMIN — SODIUM CHLORIDE, PRESERVATIVE FREE 10 ML: 5 INJECTION INTRAVENOUS at 08:00

## 2019-11-06 RX ADMIN — METOPROLOL TARTRATE 12.5 MG: 25 TABLET ORAL at 20:49

## 2019-11-06 NOTE — PLAN OF CARE
Problem: Fall Risk (Adult)  Goal: Identify Related Risk Factors and Signs and Symptoms  Outcome: Ongoing (interventions implemented as appropriate)    Goal: Absence of Fall  Outcome: Ongoing (interventions implemented as appropriate)      Problem: Pain, Chronic (Adult)  Goal: Identify Related Risk Factors and Signs and Symptoms  Outcome: Ongoing (interventions implemented as appropriate)    Goal: Acceptable Pain/Comfort Level and Functional Ability  Outcome: Ongoing (interventions implemented as appropriate)      Problem: Patient Care Overview  Goal: Plan of Care Review  Outcome: Ongoing (interventions implemented as appropriate)   11/06/19 9199   Coping/Psychosocial   Plan of Care Reviewed With patient   Plan of Care Review   Progress improving   OTHER   Outcome Summary vss; bp elevated earlier in shift but has decreased to wnl; denies any chest pain or discomfort; o2 at 1 l but is satting at 100 percent; ambulated around nurses station with walker and wife at side x 2 this pm; npo at midnight for possible intervention today; will continue to monitor     Goal: Individualization and Mutuality  Outcome: Ongoing (interventions implemented as appropriate)    Goal: Discharge Needs Assessment  Outcome: Ongoing (interventions implemented as appropriate)

## 2019-11-06 NOTE — DISCHARGE INSTRUCTIONS
[facility]  [address] [place] [address]    Coronary Angiogram (Radial/Ulnar Approach) After Care    Refer to this sheet in the next few weeks. These instructions provide you with information on caring for yourself after your procedure. Your caregiver may also give you more specific instructions. Your treatment has been planned according to current medical practices, but problems sometimes occur. Call your caregiver if you have any problems or questions after your procedure.    Home Care Instructions:  · You may shower the day after the procedure. Remove the bandage (dressing) and gently wash the site with plain soap and water. Gently pat the site dry. You may apply a band aid daily for 2 days if desired.    · Do not apply powder or lotion to the site.  · Do not submerge the affected site in water for 3 to 5 days or until the site is completely healed.   · Do not lift, push or pull anything over 10 pounds for 2 days after your procedure.  · Inspect the site at least twice daily. You may notice some bruising at the site and it may be tender for 1 to 2 weeks.     · Increase your fluid intake for the next 2 days.    · Keep arm elevated for 24 hours. For the remainder of the day, keep your arm in “Pledge of Allegiance” position when up and about.     · You may drive 24 hours after the procedure unless otherwise instructed by your caregiver.  · Do not operate machinery or power tools for 24 hours.  · A responsible adult should be with you for the first 24 hours after you arrive home. Do not make any important legal decisions or sign legal papers for 24 hours.  Do not drink alcohol for 24 hours.    · Metformin or any medications containing Metformin should not be taken for 48 hours after your procedure.      Call Your Doctor if:   · You have unusual pain at the radial/ulnar (wrist) site.  · You have redness, warmth, swelling, or pain at the radial/ulnar (wrist) site.  · You have drainage (other  than a small amount of blood on the dressing).  · You have chills or a fever > 101.  · Your arm becomes pale or dark, cool, tingly, or numb.  · You have heavy bleeding from the site, hold pressure on the site for 20 minutes.  If the bleeding stops, apply a fresh bandage and call your cardiologist.  However, if you continue to have bleeding, call 911.
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
## 2019-11-07 VITALS
OXYGEN SATURATION: 93 % | DIASTOLIC BLOOD PRESSURE: 82 MMHG | SYSTOLIC BLOOD PRESSURE: 140 MMHG | WEIGHT: 192 LBS | HEART RATE: 80 BPM | RESPIRATION RATE: 18 BRPM | TEMPERATURE: 98.1 F | HEIGHT: 69 IN | BODY MASS INDEX: 28.44 KG/M2

## 2019-11-07 LAB
ANION GAP SERPL CALCULATED.3IONS-SCNC: 8.8 MMOL/L (ref 5–15)
BUN BLD-MCNC: 30 MG/DL (ref 8–23)
BUN/CREAT SERPL: 22.9 (ref 7–25)
CALCIUM SPEC-SCNC: 9 MG/DL (ref 8.6–10.5)
CHLORIDE SERPL-SCNC: 100 MMOL/L (ref 98–107)
CO2 SERPL-SCNC: 33.2 MMOL/L (ref 22–29)
CREAT BLD-MCNC: 1.31 MG/DL (ref 0.76–1.27)
GFR SERPL CREATININE-BSD FRML MDRD: 54 ML/MIN/1.73
GLUCOSE BLD-MCNC: 152 MG/DL (ref 65–99)
GLUCOSE BLDC GLUCOMTR-MCNC: 140 MG/DL (ref 70–130)
POTASSIUM BLD-SCNC: 4.1 MMOL/L (ref 3.5–5.2)
SODIUM BLD-SCNC: 142 MMOL/L (ref 136–145)

## 2019-11-07 PROCEDURE — 80048 BASIC METABOLIC PNL TOTAL CA: CPT | Performed by: INTERNAL MEDICINE

## 2019-11-07 PROCEDURE — G0378 HOSPITAL OBSERVATION PER HR: HCPCS

## 2019-11-07 PROCEDURE — 82962 GLUCOSE BLOOD TEST: CPT

## 2019-11-07 PROCEDURE — 99217 PR OBSERVATION CARE DISCHARGE MANAGEMENT: CPT | Performed by: INTERNAL MEDICINE

## 2019-11-07 RX ORDER — HYDRALAZINE HYDROCHLORIDE 10 MG/1
10 TABLET, FILM COATED ORAL 3 TIMES DAILY
Qty: 90 TABLET | Refills: 6 | Status: SHIPPED | OUTPATIENT
Start: 2019-11-07 | End: 2019-11-22 | Stop reason: DRUGHIGH

## 2019-11-07 RX ADMIN — METOPROLOL TARTRATE 12.5 MG: 25 TABLET ORAL at 08:48

## 2019-11-07 RX ADMIN — POTASSIUM CHLORIDE 20 MEQ: 750 CAPSULE, EXTENDED RELEASE ORAL at 08:48

## 2019-11-07 RX ADMIN — FUROSEMIDE 40 MG: 40 TABLET ORAL at 08:48

## 2019-11-07 RX ADMIN — SODIUM CHLORIDE, PRESERVATIVE FREE 10 ML: 5 INJECTION INTRAVENOUS at 08:49

## 2019-11-07 RX ADMIN — HYDRALAZINE HYDROCHLORIDE 10 MG: 10 TABLET, FILM COATED ORAL at 08:48

## 2019-11-07 NOTE — PLAN OF CARE
Problem: Fall Risk (Adult)  Goal: Identify Related Risk Factors and Signs and Symptoms  Outcome: Ongoing (interventions implemented as appropriate)    Goal: Absence of Fall  Outcome: Ongoing (interventions implemented as appropriate)      Problem: Pain, Chronic (Adult)  Goal: Identify Related Risk Factors and Signs and Symptoms  Outcome: Ongoing (interventions implemented as appropriate)    Goal: Acceptable Pain/Comfort Level and Functional Ability  Outcome: Ongoing (interventions implemented as appropriate)      Problem: Patient Care Overview  Goal: Plan of Care Review  Outcome: Ongoing (interventions implemented as appropriate)   11/07/19 0521   OTHER   Outcome Summary vss; radial and brachial sites are c/d/i and soft; pulses are palpable; no complaints of pain or discomfort; will continue to monitor     Goal: Individualization and Mutuality  Outcome: Ongoing (interventions implemented as appropriate)    Goal: Discharge Needs Assessment  Outcome: Ongoing (interventions implemented as appropriate)

## 2019-11-07 NOTE — PROGRESS NOTES
Continued Stay Note  [facility]     Patient Name: Dilip Verma  MRN: [id number]  Today's Date: 11/7/2019    Admit Date: 11/5/2019    Discharge Plan     Row Name 11/07/19 1639       Plan    Final Discharge Disposition Code  01 - home or self-care    Final Note  home        Discharge Codes    No documentation.       Expected Discharge Date and Time     Expected Discharge Date Expected Discharge Time    Nov 7, 2019             Elsy Obregon RN

## 2019-11-07 NOTE — DISCHARGE SUMMARY
Patient Name: Dilip Verma  :1950  69 y.o.    Date of Admit: 2019  Date of Discharge:  2019    Discharge Diagnosis:  Problem List Items Addressed This Visit        Cardiovascular and Mediastinum    NSTEMI (non-ST elevated myocardial infarction) (CMS/formerly [facility]) - Primary    Relevant Orders    Case Request Cath Lab: Right and Left Heart Cath (Completed)    Cardiac Catheterization/Vascular Study (Completed)          Hospital Course:     1.  Chest pain.  Abnormal baseline EKG is unchanged.  He has a chronically elevated troponin.  His pain sounds atypical.    He had a stress test on 2019 which suggested inferior wall and lateral wall ischemia.  Echocardiogram showed normal LV function with ejection fraction 62%, moderate left ventricular hypertrophy, grade 2 diastolic dysfunction and severe pulmonary hypertension.  Heart catheterization on 2019 showed widely patent grafts.  His PA pressure was 80/30 with a wedge of 20.  2.  History of coronary artery disease status post bypass surgery x3 in 2019.  Repeat heart catheterization showed patent grafts.  3.  Paroxysm of atrial fibrillation post bypass.  4.  Chronic diastolic heart failure.  Number suggested that he was volume overloaded.  He was diuresed with IV Lasix yesterday and is back on oral Lasix today.  His creatinine, CO2 and BUN all crept up so I do not think there is more room for IV Lasix.  5.  Hypertension.  He follows this with his nephrologist and has an appointment next week.  His wife's been taking his blood pressure but at erratic times.  Sometimes it is before his medication and sometimes is after.  His blood pressure numbers look all over the place.  I have encouraged her to check his blood pressure at least an hour after he has his medications and to take those numbers to the nephrologist next week.  Currently his blood pressure is controlled in the hospital.  6.  Obstructive sleep apnea.  He says he is not  tolerating his PAP therapy.  I encouraged him to follow-up with Dr. Cai as an outpatient.  7.  Psychiatric issues/anxiety/PTSD/night terrors.  He was seen by access.  He was given information on outpatient treatment options.  8.  Mild bilateral carotid artery disease    Final diagnoses:  -Type II myocardial infarction from chronic kidney disease.  -Acute on chronic diastolic heart failure    Procedures Performed  Procedure(s):  Right and Left Heart Cath         Pertinent Test Results:   Results from last 7 days   Lab Units 11/07/19  0430 11/05/19  0342   SODIUM mmol/L 142 142   POTASSIUM mmol/L 4.1 4.8   CHLORIDE mmol/L 100 102   CO2 mmol/L 33.2* 30.0*   BUN mg/dL 30* 32*   CREATININE mg/dL 1.31* 1.13   CALCIUM mg/dL 9.0 9.2   BILIRUBIN mg/dL  --  0.4   ALK PHOS U/L  --  78   ALT (SGPT) U/L  --  21   AST (SGOT) U/L  --  15   GLUCOSE mg/dL 152* 295*     Results from last 7 days   Lab Units 11/05/19  0548 11/05/19  0342   TROPONIN T ng/mL 0.048* 0.052*       Results from last 7 days   Lab Units 11/06/19  1417  11/05/19  0342   WBC 10*3/mm3  --   --  7.83   HEMOGLOBIN g/dL  --   --  13.2   HEMOGLOBIN, POC g/dL 12.6   < >  --    HEMATOCRIT %  --   --  41.6   HEMATOCRIT POC % 37*   < >  --    PLATELETS 10*3/mm3  --   --  215    < > = values in this interval not displayed.                   Condition on Discharge: stable    Discharge Medications     Discharge Medications      Changes to Medications      Instructions Start Date   hydrALAZINE 10 MG tablet  Commonly known as:  APRESOLINE  What changed:  when to take this   10 mg, Oral, 3 Times Daily         Continue These Medications      Instructions Start Date   aspirin 325 MG EC tablet   325 mg, Oral, Daily      furosemide 40 MG tablet  Commonly known as:  LASIX   40 mg, Oral, 2 Times Daily      gabapentin 300 MG capsule  Commonly known as:  NEURONTIN   300 mg, Oral, Nightly      HUMALOG 100 UNIT/ML injection  Generic drug:  insulin lispro   Subcutaneous, 3 Times  Daily Before Meals, PER SS -  Currently taking 4 units at noon and 7 units in pm plus 2 units for -250, 4 units for -300, 6 units for -350      melatonin 3 MG tablet   10 mg, Oral, Nightly      metoprolol tartrate 25 MG tablet  Commonly known as:  LOPRESSOR   12.5 mg, Oral, Every 12 Hours Scheduled      potassium chloride 20 MEQ CR tablet  Commonly known as:  K-DUR,KLOR-CON   40 mEq, Oral, 2 Times Daily      pramipexole 0.25 MG tablet  Commonly known as:  MIRAPEX   0.25 mg, Oral, 2 Times Daily PRN      tamsulosin 0.4 MG capsule 24 hr capsule  Commonly known as:  FLOMAX   1 capsule, Oral, 2 Times Daily, Wife states he takes it when he has trouble voiding, Also taking for high blood pressure.      traZODone 50 MG tablet  Commonly known as:  DESYREL   50 mg, Oral, Nightly      TRESIBA FLEXTOUCH 100 UNIT/ML solution pen-injector injection  Generic drug:  insulin degludec   25 Units, Subcutaneous, Daily With Dinner      VITAMIN D PO   5,000 Units, Oral, Daily             Discharge Diet:   Diet Instructions     Diet: Consistent Carbohydrate, Cardiac, Renal      Discharge Diet:   Consistent Carbohydrate  Cardiac  Renal             Activity at Discharge:   Activity Instructions     Activity as Tolerated            Discharge disposition: home    Follow-up Appointments  Future Appointments   Date Time Provider Department Center   11/22/2019  2:30 PM Danii Dennison APRN [facility] None   12/18/2019 11:15 AM Morris Cai MD [facility] None   8/21/2020 10:20 AM Michaela Hylton MD [facility] None            Michaela Hylton MD, [facility]  11/07/19  10:45 AM    Time: Discharge 40 min

## 2019-11-07 NOTE — PLAN OF CARE
Problem: Patient Care Overview  Goal: Plan of Care Review  Outcome: Ongoing (interventions implemented as appropriate)   11/07/19 1116   Coping/Psychosocial   Plan of Care Reviewed With patient   Plan of Care Review   Progress improving   OTHER   Outcome Summary plan to discharge home this [facility]     Goal: Individualization and Mutuality  Outcome: Ongoing (interventions implemented as appropriate)    Goal: Discharge Needs Assessment  Outcome: Ongoing (interventions implemented as appropriate)    Goal: Interprofessional Rounds/Family Conf  Outcome: Ongoing (interventions implemented as appropriate)

## 2019-11-08 ENCOUNTER — READMISSION MANAGEMENT (OUTPATIENT)
Dept: CALL CENTER | Facility: HOSPITAL | Age: 69
End: 2019-11-08

## 2019-11-08 NOTE — OUTREACH NOTE
Prep Survey      Responses   Facility patient discharged from?  [place]   Is patient eligible?  Yes   Discharge diagnosis  NSTEMI, Heart cath - patent grafts,  A/C CHF   Does the patient have one of the following disease processes/diagnoses(primary or secondary)?  Acute MI (STEMI,NSTEMI)   Does the patient have Home health ordered?  No   Is there a DME ordered?  No   Prep survey completed?  Yes          Sanaz Jaime RN

## 2019-11-11 ENCOUNTER — HOSPITAL ENCOUNTER (EMERGENCY)
Facility: HOSPITAL | Age: 69
Discharge: HOME | End: 2019-11-11
Attending: EMERGENCY MEDICINE | Admitting: EMERGENCY MEDICINE

## 2019-11-11 ENCOUNTER — APPOINTMENT (OUTPATIENT)
Dept: GENERAL RADIOLOGY | Facility: HOSPITAL | Age: 69
End: 2019-11-11

## 2019-11-11 ENCOUNTER — READMISSION MANAGEMENT (OUTPATIENT)
Dept: CALL CENTER | Facility: HOSPITAL | Age: 69
End: 2019-11-11

## 2019-11-11 VITALS
TEMPERATURE: 96.9 F | DIASTOLIC BLOOD PRESSURE: 83 MMHG | HEIGHT: 69 IN | RESPIRATION RATE: 16 BRPM | SYSTOLIC BLOOD PRESSURE: 123 MMHG | OXYGEN SATURATION: 98 % | HEART RATE: 67 BPM | WEIGHT: 194 LBS | BODY MASS INDEX: 28.73 KG/M2

## 2019-11-11 DIAGNOSIS — M54.12 CERVICAL RADICULOPATHY: Primary | ICD-10-CM

## 2019-11-11 DIAGNOSIS — M25.512 ACUTE PAIN OF LEFT SHOULDER: ICD-10-CM

## 2019-11-11 LAB
ALBUMIN SERPL-MCNC: 3.7 G/DL (ref 3.5–5.2)
ALBUMIN/GLOB SERPL: 1 G/DL
ALP SERPL-CCNC: 79 U/L (ref 39–117)
ALT SERPL W P-5'-P-CCNC: 16 U/L (ref 1–41)
ANION GAP SERPL CALCULATED.3IONS-SCNC: 11.2 MMOL/L (ref 5–15)
AST SERPL-CCNC: 20 U/L (ref 1–40)
BASOPHILS # BLD AUTO: 0.05 10*3/MM3 (ref 0–0.2)
BASOPHILS NFR BLD AUTO: 0.7 % (ref 0–1.5)
BILIRUB SERPL-MCNC: 0.3 MG/DL (ref 0.2–1.2)
BUN BLD-MCNC: 32 MG/DL (ref 8–23)
BUN/CREAT SERPL: 21.8 (ref 7–25)
CALCIUM SPEC-SCNC: 9.2 MG/DL (ref 8.6–10.5)
CHLORIDE SERPL-SCNC: 105 MMOL/L (ref 98–107)
CO2 SERPL-SCNC: 26.8 MMOL/L (ref 22–29)
CREAT BLD-MCNC: 1.47 MG/DL (ref 0.76–1.27)
DEPRECATED RDW RBC AUTO: 52 FL (ref 37–54)
EOSINOPHIL # BLD AUTO: 0.36 10*3/MM3 (ref 0–0.4)
EOSINOPHIL NFR BLD AUTO: 4.7 % (ref 0.3–6.2)
ERYTHROCYTE [DISTWIDTH] IN BLOOD BY AUTOMATED COUNT: 16 % (ref 12.3–15.4)
GFR SERPL CREATININE-BSD FRML MDRD: 47 ML/MIN/1.73
GLOBULIN UR ELPH-MCNC: 3.8 GM/DL
GLUCOSE BLD-MCNC: 310 MG/DL (ref 65–99)
HCT VFR BLD AUTO: 43 % (ref 37.5–51)
HGB BLD-MCNC: 13.6 G/DL (ref 13–17.7)
IMM GRANULOCYTES # BLD AUTO: 0.01 10*3/MM3 (ref 0–0.05)
IMM GRANULOCYTES NFR BLD AUTO: 0.1 % (ref 0–0.5)
LYMPHOCYTES # BLD AUTO: 1.74 10*3/MM3 (ref 0.7–3.1)
LYMPHOCYTES NFR BLD AUTO: 22.6 % (ref 19.6–45.3)
MCH RBC QN AUTO: 28.3 PG (ref 26.6–33)
MCHC RBC AUTO-ENTMCNC: 31.6 G/DL (ref 31.5–35.7)
MCV RBC AUTO: 89.4 FL (ref 79–97)
MONOCYTES # BLD AUTO: 0.67 10*3/MM3 (ref 0.1–0.9)
MONOCYTES NFR BLD AUTO: 8.7 % (ref 5–12)
NEUTROPHILS # BLD AUTO: 4.86 10*3/MM3 (ref 1.7–7)
NEUTROPHILS NFR BLD AUTO: 63.2 % (ref 42.7–76)
NRBC BLD AUTO-RTO: 0 /100 WBC (ref 0–0.2)
PLATELET # BLD AUTO: 194 10*3/MM3 (ref 140–450)
PMV BLD AUTO: 10.5 FL (ref 6–12)
POTASSIUM BLD-SCNC: 5.9 MMOL/L (ref 3.5–5.2)
PROT SERPL-MCNC: 7.5 G/DL (ref 6–8.5)
RBC # BLD AUTO: 4.81 10*6/MM3 (ref 4.14–5.8)
SODIUM BLD-SCNC: 143 MMOL/L (ref 136–145)
TROPONIN T SERPL-MCNC: 0.04 NG/ML (ref 0–0.03)
WBC NRBC COR # BLD: 7.69 10*3/MM3 (ref 3.4–10.8)

## 2019-11-11 PROCEDURE — 85025 COMPLETE CBC W/AUTO DIFF WBC: CPT | Performed by: EMERGENCY MEDICINE

## 2019-11-11 PROCEDURE — 73030 X-RAY EXAM OF SHOULDER: CPT

## 2019-11-11 PROCEDURE — 25010000002 ONDANSETRON PER 1 MG: Performed by: EMERGENCY MEDICINE

## 2019-11-11 PROCEDURE — 25010000002 HYDROMORPHONE PER 4 MG: Performed by: EMERGENCY MEDICINE

## 2019-11-11 PROCEDURE — 96374 THER/PROPH/DIAG INJ IV PUSH: CPT

## 2019-11-11 PROCEDURE — 93010 ELECTROCARDIOGRAM REPORT: CPT | Performed by: INTERNAL MEDICINE

## 2019-11-11 PROCEDURE — 96375 TX/PRO/DX INJ NEW DRUG ADDON: CPT

## 2019-11-11 PROCEDURE — 93005 ELECTROCARDIOGRAM TRACING: CPT | Performed by: EMERGENCY MEDICINE

## 2019-11-11 PROCEDURE — 80053 COMPREHEN METABOLIC PANEL: CPT | Performed by: EMERGENCY MEDICINE

## 2019-11-11 PROCEDURE — 84484 ASSAY OF TROPONIN QUANT: CPT | Performed by: EMERGENCY MEDICINE

## 2019-11-11 PROCEDURE — 99283 EMERGENCY DEPT VISIT LOW MDM: CPT

## 2019-11-11 RX ORDER — HYDROMORPHONE HYDROCHLORIDE 1 MG/ML
0.5 INJECTION, SOLUTION INTRAMUSCULAR; INTRAVENOUS; SUBCUTANEOUS ONCE
Status: COMPLETED | OUTPATIENT
Start: 2019-11-11 | End: 2019-11-11

## 2019-11-11 RX ORDER — HYDROCODONE BITARTRATE AND ACETAMINOPHEN 5; 325 MG/1; MG/1
1 TABLET ORAL EVERY 4 HOURS PRN
Qty: 18 TABLET | Refills: 0 | Status: SHIPPED | OUTPATIENT
Start: 2019-11-11 | End: 2019-11-22

## 2019-11-11 RX ORDER — METAXALONE 800 MG/1
800 TABLET ORAL 3 TIMES DAILY PRN
Qty: 15 TABLET | Refills: 0 | Status: SHIPPED | OUTPATIENT
Start: 2019-11-11 | End: 2020-01-10

## 2019-11-11 RX ORDER — ONDANSETRON 2 MG/ML
4 INJECTION INTRAMUSCULAR; INTRAVENOUS ONCE
Status: COMPLETED | OUTPATIENT
Start: 2019-11-11 | End: 2019-11-11

## 2019-11-11 RX ORDER — SODIUM CHLORIDE 0.9 % (FLUSH) 0.9 %
10 SYRINGE (ML) INJECTION AS NEEDED
Status: DISCONTINUED | OUTPATIENT
Start: 2019-11-11 | End: 2019-11-11 | Stop reason: HOSPADM

## 2019-11-11 RX ADMIN — HYDROMORPHONE HYDROCHLORIDE 0.5 MG: 1 INJECTION, SOLUTION INTRAMUSCULAR; INTRAVENOUS; SUBCUTANEOUS at 03:21

## 2019-11-11 RX ADMIN — ONDANSETRON 4 MG: 2 INJECTION INTRAMUSCULAR; INTRAVENOUS at 03:19

## 2019-11-11 NOTE — OUTREACH NOTE
AMI Week 1 Survey      Responses   Facility patient discharged from?  [place]   Does the patient have one of the following disease processes/diagnoses(primary or secondary)?  Acute MI (STEMI,NSTEMI)   Is there a successful TCM telephone encounter documented?  No   Week 1 attempt successful?  Yes   Call start time  1219   Call end time  1228   Discharge diagnosis  NSTEMI, Heart cath - patent grafts,  A/C CHF   Is patient permission given to speak with other caregiver?  Yes   Person spoke with today (if not patient) and relationship  Beny Spouse   Meds reviewed with patient/caregiver?  Yes   Is the patient having any side effects they believe may be caused by any medication additions or changes?  No   Does the patient have all prescriptions related to this admission filled (includes statins,anticoagulants,HTN meds,anti-arrhythmia meds)  Yes   Is the patient taking all medications as directed (includes completed medication regime)?  Yes   Does the patient have a primary care provider?   Yes   Does the patient have an appointment with their PCP,cardiologist,or clinic within 7 days of discharge?  Yes   Has the patient kept scheduled appointments due by today?  N/A   Has home health visited the patient within 72 hours of discharge?  N/A   Psychosocial issues?  No   Did the patient receive a copy of their discharge instructions?  Yes   Nursing interventions  Reviewed instructions with patient   What is the patient's perception of their health status since discharge?  Improving   Nursing interventions  Nurse provided patient education   Is the patient/caregiver able to teach back signs and symptoms of when to call for help immediately:  Sudden chest discomfort, Sudden discomfort in arms, back, neck or jaw, Shortness of breath at any time, Sudden sweating or clammy skin, Nausea or vomiting, Dizziness or lightheadedness, Irregular or rapid heart rate   Nursing interventions  Nurse provided patient education   Is the  pateint /caregiver able to teach back the importance of cardiac rehab?  Yes   Nursing interventions  Provided education on importance of cardiac rehab   Is the patient/caregiver able to teach back lifestyle changes to help prevent MIs  Regular exercise as approved by provider, Heart healthy diet, Maintaining a healthy weight, Reducing stress   Is the patient/caregiver able to teach back ways to prevent a second heart attack:  Take medications, Follow up with MD, Participate in Cardiac Rehab, Manage risk factors   Is the patient/caregiver able to teach back the hierarchy of who to call/visit for symptoms/problems? PCP, Specialist, Home health nurse, Urgent Care, ED, 911  Yes   Additional teach back comments  He is having troubles with sleep, no chest pain, encouraged neuro psych eval.   Week 1 call completed?  Yes          Neida Marti RN

## 2019-11-11 NOTE — ED PROVIDER NOTES
EMERGENCY DEPARTMENT ENCOUNTER    CHIEF COMPLAINT  Chief Complaint: L shoulder pain  History given by: Pt  History limited by: None  Room Number: 15/15  PMD: Vishal Adrian Jr., MD      HPI:  Pt is a 69 y.o. male with hx of spinal stenosis who presents complaining of sudden, constant L shoulder pain that radiates down L arm that began around 2330 last night. He states the pain is 3/10 at this time and is exacerbated with movement. He denies any alleviating factors. He also denies CP at this time. No other complaints.       Duration:  2330 last night  Onset: Sudden  Timing: Constant  Location: L shoulder  Radiation: L arm  Quality: Pain  Intensity/Severity: Moderate  Progression: Unchanged  Aggravating Factors: Movement      PAST MEDICAL HISTORY  Active Ambulatory Problems     Diagnosis Date Noted   • Anxiety    • Colon polyp    • Diabetes mellitus, type II (CMS/HCC)    • Erectile dysfunction    • Hyperlipidemia    • Essential hypertension    • Obstructive sleep apnea on autoBiPAP    • NSTEMI (non-ST elevated myocardial infarction) (CMS/[facility]) 07/12/2019   • CAD (coronary artery disease) 07/20/2019   • Hypertensive urgency 07/20/2019   • Acute diastolic (congestive) heart failure (CMS/HCC) 07/20/2019   • Orthostasis 07/20/2019   • Hx of CABG 08/19/2019   • Chronic diastolic CHF (congestive heart failure) (CMS/[facility]) 09/11/2019   • Hypersomnia due to medical condition 09/14/2019   • CSA (central sleep apnea) 09/14/2019   • Periodic breathing 09/14/2019     Resolved Ambulatory Problems     Diagnosis Date Noted   • No Resolved Ambulatory Problems     Past Medical History:   Diagnosis Date   • Acute diastolic (congestive) heart failure (CMS/[facility])    • Anxiety    • Chest pain    • Chronic kidney disease    • Colon polyp    • Coronary artery disease    • Diabetes mellitus, type II (CMS/HCC)    • Erectile dysfunction    • H/O bone density study never   • Hyperlipidemia    • Hypersomnia    • Hypertension    • Kidney stone     • NSTEMI (non-ST elevated myocardial infarction) (CMS/[facility])    • Orthostasis    • Periodic breathing    • Routine eye exam 2015   • Sleep apnea    • Stroke (CMS/[facility])        PAST SURGICAL HISTORY  Past Surgical History:   Procedure Laterality Date   • CARDIAC CATHETERIZATION N/A 7/15/2019    Procedure: Coronary angiography;  Surgeon: Carrie Price MD;  [facility];  Service: Cardiovascular   • CARDIAC CATHETERIZATION N/A 7/15/2019    Procedure: Left Heart Cath;  Surgeon: Carrie Price MD;  Location[facility]IVE LOCATION;  Service: Cardiovascular   • CARDIAC CATHETERIZATION N/A 7/15/2019    Procedure: Left ventriculography;  Surgeon: Carrie Price MD;  Location: [facility] C[facility] INVASIVE LOCATION;  Service: Cardiovascular   • CARDIAC CATHETERIZATION  7/15/2019    Procedure: Functional Flow [place];  Surgeon: Carrie Pirce MD;  Location: [facility] CATH INVASIVE LOCATION;  Service: Cardiovascular   • CARDIAC CATHETERIZATION N/A 11/6/2019    Procedure: Right and Left Heart Cath;  Surgeon: Mya Smith MD;  Location: [facility] C[facility]VASIVE LOCATION;  Service: Cardiovascular   • CARDIAC CATHETERIZATION N/A 11/6/2019    Procedure: Coronary angiography;  Surgeon: Mya Smith MD;  Location: [facility] CATH INVASIVE LOCATION;  Service: Cardiovascular   • CATARACT EXTRACTION EXTRACAPSULAR W/ INTRAOCULAR LENS IMPLANTATION Bilateral    • COLONOSCOPY  01/2015    dr [email or web address]   • CORONARY ARTERY BYPASS GRAFT N/A 7/18/2019    Procedure: INTRAOPERATIVE SHOAIB; STERNOTOMY CORONARY ARTERY BYPASS x 3  USING LEFT INTERNAL MAMMARY ARTERY GRAFT UTILIZING ENDOSCOPICALLY HARVESTED RIGHT GREATER SAPHENOUS VEIN AND PRP.;  Surgeon: Bill Devi MD;  Location: [facility] OR;  Service: Cardiothoracic   • DENTAL PROCEDURE      3 surgeries inder implants   • HERNIA REPAIR      inguinal bilaterally   • KIDNEY STONE SURGERY      lithotripsy       FAMILY HISTORY  Family History   Problem Relation Age of Onset   •  Depression Mother    • Cancer Mother         uterine   • Heart disease Father    • Hypertension Father    • Kidney disease Father    • Thyroid disease Father    • Depression Sister    • Alcohol abuse Brother    • Alcohol abuse Other    • Alcohol abuse Other    • Lung disease Other    • Thyroid disease Other    • Glaucoma Other    • Heart attack Other        SOCIAL HISTORY  Social History     Socioeconomic History   • Marital status:      Spouse name: Not on file   • Number of children: Not on file   • Years of education: Not on file   • Highest education level: Not on file   Tobacco Use   • Smoking status: Former Smoker     Packs/day: 0.75   • Smokeless tobacco: Never Used   • Tobacco comment: Start age:39/Stopping age:47, 3/4 PPD   Substance and Sexual Activity   • Alcohol use: No   • Drug use: No   • Sexual activity: Defer       ALLERGIES  Ativan [lorazepam]; Ace inhibitors; Adhesive tape; Losartan; Minoxidil; Penicillin g; Sulfa antibiotics; and Tetracycline    REVIEW OF SYSTEMS  Review of Systems   Constitutional: Negative for activity change, appetite change and fever.   HENT: Negative for congestion and sore throat.    Eyes: Negative.    Respiratory: Negative for cough and shortness of breath.    Cardiovascular: Negative for chest pain and leg swelling.   Gastrointestinal: Negative for abdominal pain, diarrhea and vomiting.   Endocrine: Negative.    Genitourinary: Negative for decreased urine volume and dysuria.   Musculoskeletal: Positive for arthralgias (L shoulder). Negative for neck pain.   Skin: Negative for rash and wound.   Allergic/Immunologic: Negative.    Neurological: Negative for weakness, numbness and headaches.   Hematological: Negative.    Psychiatric/Behavioral: Negative.    All other systems reviewed and are negative.      PHYSICAL EXAM  ED Triage Vitals [11/11/19 0200]   Temp Heart Rate Resp BP SpO2   96.9 °F (36.1 °C) 80 18 -- 96 %      Temp src Heart Rate Source Patient Position BP  Location FiO2 (%)   Tympanic Monitor -- -- --       Physical Exam   Constitutional: No distress.   HENT:   Head: Normocephalic and atraumatic.   Eyes: EOM are normal.   Neck: Normal range of motion.   Cardiovascular: Normal rate and regular rhythm.   Pulmonary/Chest: No respiratory distress.   Abdominal: There is no tenderness.   Musculoskeletal: He exhibits no edema.        Left shoulder: He exhibits tenderness (palpation with reproduction of sx).   Neurological: He is alert.   Skin: Skin is warm and dry.   Nursing note and vitals reviewed.      LAB RESULTS  Lab Results (last 24 hours)     Procedure Component Value Units Date/Time    CBC & Differential [[id number]] Collected:  11/11/19 0257    Specimen:  Blood Updated:  11/11/19 0314    Narrative:       The following orders were created for panel order CBC & Differential.  Procedure                               Abnormality         Status                     ---------                               -----------         ------                     CBC Auto Differential[[id number]]        Abnormal            Final result                 Please view results for these tests on the individual orders.    Comprehensive Metabolic Panel [[id number]]  (Abnormal) Collected:  11/11/19 0257    Specimen:  Blood Updated:  11/11/19 0335     Glucose 310 mg/dL      BUN 32 mg/dL      Creatinine 1.47 mg/dL      Sodium 143 mmol/L      Potassium 5.9 mmol/L      Chloride 105 mmol/L      CO2 26.8 mmol/L      Calcium 9.2 mg/dL      Total Protein 7.5 g/dL      Albumin 3.70 g/dL      ALT (SGPT) 16 U/L      AST (SGOT) 20 U/L      Comment: Specimen hemolyzed.  Results may be affected.        Alkaline Phosphatase 79 U/L      Total Bilirubin 0.3 mg/dL      eGFR Non African Amer 47 mL/min/1.73      Globulin 3.8 gm/dL      A/G Ratio 1.0 g/dL      BUN/Creatinine Ratio 21.8     Anion Gap 11.2 mmol/L     Narrative:       GFR Normal >60  Chronic Kidney Disease <60  Kidney Failure <15    Troponin [[id number]]   (Abnormal) Collected:  11/11/19 0257    Specimen:  Blood Updated:  11/11/19 0334     Troponin T 0.042 ng/mL     Narrative:       Troponin T Reference Range:  <= 0.03 ng/mL-   Negative for AMI  >0.03 ng/mL-     Abnormal for myocardial necrosis.  Clinicians would have to utilize clinical acumen, EKG, Troponin and serial changes to determine if it is an Acute Myocardial Infarction or myocardial injury due to an underlying chronic condition.     CBC Auto Differential [[id number]]  (Abnormal) Collected:  11/11/19 0257    Specimen:  Blood Updated:  11/11/19 0314     WBC 7.69 10*3/mm3      RBC 4.81 10*6/mm3      Hemoglobin 13.6 g/dL      Hematocrit 43.0 %      MCV 89.4 fL      MCH 28.3 pg      MCHC 31.6 g/dL      RDW 16.0 %      RDW-SD 52.0 fl      MPV 10.5 fL      Platelets 194 10*3/mm3      Neutrophil % 63.2 %      Lymphocyte % 22.6 %      Monocyte % 8.7 %      Eosinophil % 4.7 %      Basophil % 0.7 %      Immature Grans % 0.1 %      Neutrophils, Absolute 4.86 10*3/mm3      Lymphocytes, Absolute 1.74 10*3/mm3      Monocytes, Absolute 0.67 10*3/mm3      Eosinophils, Absolute 0.36 10*3/mm3      Basophils, Absolute 0.05 10*3/mm3      Immature Grans, Absolute 0.01 10*3/mm3      nRBC 0.0 /100 WBC           I ordered the above labs and reviewed the results    RADIOLOGY  XR Shoulder 2+ View Left   Final Result   No acute findings       This report was finalized on 11/11/2019 3:33 AM by Dr. Gayle Okeefe M.D.               I ordered the above noted radiological studies. Interpreted by radiologist.       PROCEDURES  Procedures    EKG          EKG time: 0250  Rhythm/Rate: SR 75  P waves and WA: Nml  QRS, axis: Nml   ST and T waves: Diffuse ST depression, no specific T wave abnormalities     Interpreted Contemporaneously by me, independently viewed  Unchanged compared to prior 11/05/2019      PROGRESS AND CONSULTS       0303: Ordered XR L shoulder and labs for further evaluation. Ordered dilaudid for pain control.     0500:  Rechecked pt who is resting comfortably. Informed pt on XR L shoulder and lab results. Discussed plan to discharge pt home. Pt understands and agrees with the plan, all questions answered.      MEDICAL DECISION MAKING  Results were reviewed/discussed with the patient and they were also made aware of online access. Pt also made aware that some labs, such as cultures, will not be resulted during ER visit and follow up with PMD is necessary.     MDM  Number of Diagnoses or Management Options     Amount and/or Complexity of Data Reviewed  Clinical lab tests: ordered and reviewed  Tests in the radiology section of CPT®: ordered and reviewed (XR L shoulder shows: The patient has some minimal degenerative changes of the left shoulder, with more significant degenerative changes noted involving the left AC joint.)  Tests in the medicine section of CPT®: ordered and reviewed  Review and summarize past medical records: yes (11/6/2019: Cardiac cath procedure)    Patient Progress  Patient progress: stable         DIAGNOSIS  Final diagnoses:   Cervical radiculopathy   Acute pain of left shoulder       DISPOSITION  DISCHARGE    Patient discharged in stable condition.    Reviewed implications of results, diagnosis, meds, responsibility to follow up, warning signs and symptoms of possible worsening, potential complications and reasons to return to ER.    Patient/Family voiced understanding of above instructions.    Discussed plan for discharge, as there is no emergent indication for admission. Patient referred to primary care provider for BP management due to today's BP. Pt/family is agreeable and understands need for follow up and repeat testing.  Pt is aware that discharge does not mean that nothing is wrong but it indicates no emergency is present that requires admission and they must continue care with follow-up as given below or physician of their choice.     FOLLOW-UP  Vishal Adrian Jr., MD  [address] [place] [address]  [place] [address]  [phone]    Schedule an appointment as soon as possible for a visit            Medication List      New Prescriptions    HYDROcodone-acetaminophen 5-325 MG per tablet  Commonly known as:  NORCO  Take 1 tablet by mouth Every 4 (Four) Hours As Needed for Moderate Pain .     metaxalone 800 MG tablet  Commonly known as:  SKELAXIN  Take 1 tablet by mouth 3 (Three) Times a Day As Needed for Muscle Spasms.              Latest Documented Vital Signs:  As of 6:15 AM  BP- 123/83 HR- 67 Temp- 96.9 °F (36.1 °C) (Tympanic) O2 sat- 98%    --  Documentation assistance provided by elan Camargo MD for Dr. Camargo.  Information recorded by the [email or web address] was done at my direction and has been verified and validated by me.         Leonie Salazar  11/11/19 0511       Rodriguez Camargo MD  11/11/19 0621

## 2019-11-15 ENCOUNTER — HOSPITAL ENCOUNTER (OUTPATIENT)
Dept: SPEECH THERAPY | Facility: HOSPITAL | Age: 69
Setting detail: THERAPIES SERIES
Discharge: HOME | End: 2019-11-15

## 2019-11-15 DIAGNOSIS — R13.10 DYSPHAGIA, UNSPECIFIED TYPE: Primary | ICD-10-CM

## 2019-11-15 PROCEDURE — 92610 EVALUATE SWALLOWING FUNCTION: CPT | Performed by: SPEECH-LANGUAGE PATHOLOGIST

## 2019-11-15 NOTE — THERAPY EVALUATION
"Outpatient Speech Language Pathology   Adult Swallow Initial Evaluation  [facility]     Patient Name: Dilip Verma  : 1950  MRN: [id number]  Today's Date: 11/15/2019         Visit Date: 11/15/2019   Patient Active Problem List   Diagnosis   • Anxiety   • Colon polyp   • Diabetes mellitus, type II (CMS/HCC)   • Erectile dysfunction   • Hyperlipidemia   • Essential hypertension   • Obstructive sleep apnea on autoBiPAP   • NSTEMI (non-ST elevated myocardial infarction) (CMS/HCC)   • CAD (coronary artery disease)   • Hypertensive urgency   • Acute diastolic (congestive) heart failure (CMS/HCC)   • Orthostasis   • Hx of CABG   • Chronic diastolic CHF (congestive heart failure) (CMS/HCC)   • Hypersomnia due to medical condition   • CSA (central sleep apnea)   • Periodic breathing        Past Medical History:   Diagnosis Date   • Acute diastolic (congestive) heart failure (CMS/HCC)    • Anxiety    • Chest pain    • Chronic kidney disease     stones- had lithotripsy   • Colon polyp    • Coronary artery disease     CABG 2019   • Diabetes mellitus, type II (CMS/HCC)    • Erectile dysfunction    • H/O bone density study never   • Hyperlipidemia    • Hypersomnia    • Hypertension    • Kidney stone    • NSTEMI (non-ST elevated myocardial infarction) (CMS/HCC)    • Orthostasis    • Periodic breathing    • Routine eye exam    • Sleep apnea     bipap   • Stroke (CMS/HCC)     \"slight stroke\"        Past Surgical History:   Procedure Laterality Date   • CARDIAC CATHETERIZATION N/A 7/15/2019    Procedure: Coronary angiography;  Surgeon: Carrie Price MD;  Location: [facility] INVASIVE LOCATION;  Service: Cardiovascular   • CARDIAC CATHETERIZATION N/A 7/15/2019    Procedure: Left Heart Cath;  Surgeon: Carrie Price MD;  Location: [facility] INVASIVE LOCATION;  Service: Cardiovascular   • CARDIAC CATHETERIZATION N/A 7/15/2019    Procedure: Left ventriculography;  Surgeon: Carrie Price MD;  Location: [facility]" [facility] CATH INVASIVE LOCATION;  Service: Cardiovascular   • CARDIAC CATHETERIZATION  7/15/2019    Procedure: Functional Flow [place];  Surgeon: Carrie Price MD;  Location:  [facility] CATH INVASIVE LOCATION;  Service: Cardiovascular   • CARDIAC CATHETERIZATION N/A 11/6/2019    Procedure: Right and Left Heart Cath;  Surgeon: Mya Smith MD;  Location:  [facility] CATH INVASIVE LOCATION;  Service: Cardiovascular   • CARDIAC CATHETERIZATION N/A 11/6/2019    Procedure: Coronary angiography;  Surgeon: Mya Smith MD;  Location:  [facility] CATH INVASIVE LOCATION;  Service: Cardiovascular   • CATARACT EXTRACTION EXTRACAPSULAR W/ INTRAOCULAR LENS IMPLANTATION Bilateral    • COLONOSCOPY  01/2015    dr jimenez   • CORONARY ARTERY BYPASS GRAFT N/A 7/18/2019    Procedure: INTRAOPERATIVE SHOAIB; STERNOTOMY CORONARY ARTERY BYPASS x 3  USING LEFT INTERNAL MAMMARY ARTERY GRAFT UTILIZING ENDOSCOPICALLY HARVESTED RIGHT GREATER SAPHENOUS VEIN AND PRP.;  Surgeon: Bill Devi MD;  Location: [facility] MAIN OR;  Service: Cardiothoracic   • DENTAL PROCEDURE      3 surgeries inder implants   • HERNIA REPAIR      inguinal bilaterally   • KIDNEY STONE SURGERY      lithotripsy         Visit Dx:     ICD-10-CM ICD-9-CM   1. Dysphagia, unspecified type R13.10 787.20           SLP Adult Swallow Evaluation     Row Name 11/15/19 1400       Rehab Evaluation    Document Type  evaluation  -KA    Subjective Information  no complaints  -KA    Patient Observations  alert;cooperative  -KA    Patient Effort  fair  -KA    Comment  Patient very tangential during evaluation, frequently discussing topics not relevant to swallow or reason for evaluation. Patient required max redirection to evaluation and purpose, and advised to discuss with his MD regarding psychological needs.  -KA    Symptoms Noted During/After Treatment  none  -KA       General Information    Patient Profile Reviewed  yes  -KA    Pertinent History Of Current Problem  Patient referred to OP speech  "therapy for difficulty swallowing. Patient's only complaint today was, \"If I lay down after drinking fluids will come back up and sometimes it is mixed with phelgm.\" Patient denies coughing, choking, difficulty swallowing food/liquid or any other overt s/s of pen/asp. Patient denies hx of PNA. Patient medical hx is signficiant for recent CABG three months ago and report his current complaint began after his surgery. 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 Once wife was present, she reported patient was told his stroke was 1 or 2 years. Patient does have slight left labial droop and pt denies difficulty swallowing following CVA.   -KA    Current Method of Nutrition  regular textures;thin liquids  -KA    Precautions/Limitations, Vision  WFL;for purposes of eval  -KA    Precautions/Limitations, Hearing  WFL;for purposes of eval  -KA    Prior Level of Function-Swallowing  no diet consistency restrictions;safe, efficient swallowing in all situations  -KA    Plans/Goals Discussed with  patient;family;agreed upon  -KA    Barriers to Rehab  -- difficulty staying on topic  -KA    Patient's Goals for Discharge  -- no specific stated goals  -KA    Family Goals for Discharge  family did not state  -KA       Oral Motor and Function    Dentition Assessment  natural, present and adequate  -KA    Secretion Management  WNL/WFL  -KA    Mucosal Quality  moist, healthy  -KA    Volitional Swallow  WFL  -KA    Volitional Cough  WFL  -KA       Oral Musculature and Cranial Nerve Assessment    Oral Motor General Assessment  oral labial or buccal impairment  -KA    Oral Labial or Buccal Impairment, Detail, Cranial Nerve VII (Facial):  left labial droop  -KA       General Eating/Swallowing Observations    Respiratory Support Currently in Use  room air  -KA    Eating/Swallowing Skills  self-fed  -KA    Positioning During Eating  upright in chair  -KA    Utensils Used  spoon;cup;straw  -KA    Consistencies " "Trialed  regular textures;soft textures;chopped;pureed;thin liquids mixed  -KA       Clinical Swallow Eval    Oral Prep Phase  WFL  -KA    Oral Transit  WFL  -KA    Oral Residue  WFL  -KA    Pharyngeal Phase  no overt signs/symptoms of pharyngeal impairment  -KA    Esophageal Phase  suspected esophageal impairment  -KA    Clinical Swallow Evaluation Summary  Patient demonstrated no overt s/s of pen/asp with all tested consistencies, laryngeal elevation felt adequate upon neck palpation. Overall pt demonstrated no s/s of difficulty swallowing and SLP educated paitent on the signs for dysphagia and aspiration and PNA, pt voiced understanding and denied experiencing any symptoms. SLP asked pt regarding hx of acid reflux. Patient stated no and wife reported pt previously \"lived on tums,\" however no longer has reflux. SLP educated pt on safe swallow/reflux precautions and advised pt to not lay down following eating/drinking and explained risk for reflux. patient reported he is a snacker and does eat right before laying down at night and SLP advised he wait 60 minutes after eating before laying down. SLP discussed benefits of VFSS to further assess swallow however pt denied and stated he didn't feel he needed it right now.   -KA       Clinical Impression    SLP Swallowing Diagnosis  functional oral phase;functional pharyngeal phase  -KA    Functional Impact  risk of aspiration/pneumonia  -KA    Swallow Criteria for Skilled Therapeutic Interventions Met  no problems identified which require skilled intervention  -KA       Recommendations    Therapy Frequency (Swallow)  evaluation only  -KA    SLP Diet Recommendation  regular textures;thin liquids  -KA    Recommended Precautions and Strategies  upright posture during/after eating;small bites of food and sips of liquid  -KA    SLP Rec. for Method of Medication Administration  meds whole;with thick liquids  -KA    Monitor for Signs of Aspiration  yes;notify SLP if any " concerns;right lower lobe infiltrates;pneumonia;upper respiratory;fever;throat clearing;gurgly voice;elevated WBC count;cough  -KA    Anticipated Dischage Disposition  home with assist  -KA    Demonstrates Need for Referral to Another Service  gastroenterology  -KA    Row Name 11/15/19 1300       Rehab Evaluation    Document Type  evaluation  -KA      User Key  (r) = Recorded By, (t) = Taken By, (c) = Cosigned By    Initials Name Provider Type    Mahendra Garcia MA,CCC-SLP Speech and Language Pathologist                        OP SLP Education     Row Name 11/15/19 1455       Education    Barriers to Learning  No barriers identified  -KA    Education Provided  Described results of evaluation;Patient expressed understanding of evaluation;Family/caregivers expressed understanding of evaluation  -KA    Assessed  Learning needs;Learning motivation  -KA    Learning Motivation  Moderate  -KA    Learning Method  Demonstration;Explanation  -KA    Teaching Response  Verbalized understanding;Demonstrated understanding  -KA      User Key  (r) = Recorded By, (t) = Taken By, (c) = Cosigned By    Initials Name Effective Dates    Mahendra Garcia MA,CCC-SLP 06/08/18 -               OP SLP Assessment/Plan - 11/15/19 1454        SLP Assessment    Functional Problems  Swallowing   -KA    Impact on Function: Swallowing  Risk of pneumonia   -KA    Clinical Impression: Swallowing  WNL   -KA    Please refer to paper survey for additional self-reported information  Yes   -KA    Please refer to items scanned into chart for additional diagnostic informaiton and handouts as provided by clinician  Yes   -KA    Prognosis  Good (comment)   -KA    Patient would benefit from skilled therapy intervention  No   -KA      User Key  (r) = Recorded By, (t) = Taken By, (c) = Cosigned By    Initials Name Provider Type    Mahendra Garcia MA,CCC-SLP Speech and Language Pathologist              SLP Outcome Measures (last 72 hours)      SLP Outcome  Measures     Row Name 11/15/19 1400             SLP Outcome Measures    Outcome Measure Used?  Adult NOMS  -KA         Adult FCM Scores    FCM Chosen  Swallowing  -      Swallowing FCM Score  7  -KA        User Key  (r) = Recorded By, (t) = Taken By, (c) = Cosigned By    Initials Name Effective Dates    Mahendra Garcia MA,CCC-SLP 06/08/18 -                Time Calculation:   SLP Start Time: 1110  SLP Stop Time: 1150  SLP Time Calculation (min): 40 min    Therapy Charges for Today     Code Description Service Date Service Provider Modifiers Qty    [id number] HC ST EVAL ORAL PHARYNG SWALLOW 3 11/15/2019 Mahendra Short MA,CCC-SLP GN 1                   Mahendra Short MA,CCC-SLP  11/15/2019

## 2019-11-19 ENCOUNTER — READMISSION MANAGEMENT (OUTPATIENT)
Dept: CALL CENTER | Facility: HOSPITAL | Age: 69
End: 2019-11-19

## 2019-11-19 NOTE — OUTREACH NOTE
AMI Week 2 Survey      Responses   Facility patient discharged from?  [facility]   Does the patient have one of the following disease processes/diagnoses(primary or secondary)?  Acute MI (STEMI,NSTEMI)   Week 2 attempt successful?  No   Unsuccessful attempts  Attempt 1          Venus Alexandre RN

## 2019-11-20 ENCOUNTER — READMISSION MANAGEMENT (OUTPATIENT)
Dept: CALL CENTER | Facility: HOSPITAL | Age: 69
End: 2019-11-20

## 2019-11-20 RX ORDER — TRAZODONE HYDROCHLORIDE 50 MG/1
TABLET ORAL
Qty: 30 TABLET | Refills: 0 | Status: SHIPPED | OUTPATIENT
Start: 2019-11-20 | End: 2019-12-20

## 2019-11-20 NOTE — OUTREACH NOTE
AMI Week 2 Survey      Responses   Facility patient discharged from?  [place]   Does the patient have one of the following disease processes/diagnoses(primary or secondary)?  Acute MI (STEMI,NSTEMI)   Week 2 attempt successful?  No   Unsuccessful attempts  Attempt 2          Venus Wen RN

## 2019-11-22 ENCOUNTER — OFFICE VISIT (OUTPATIENT)
Dept: CARDIOLOGY | Facility: CLINIC | Age: 69
End: 2019-11-22

## 2019-11-22 ENCOUNTER — APPOINTMENT (OUTPATIENT)
Dept: SPEECH THERAPY | Facility: HOSPITAL | Age: 69
End: 2019-11-22

## 2019-11-22 VITALS
DIASTOLIC BLOOD PRESSURE: 70 MMHG | HEART RATE: 71 BPM | BODY MASS INDEX: 28.14 KG/M2 | WEIGHT: 190 LBS | HEIGHT: 69 IN | SYSTOLIC BLOOD PRESSURE: 150 MMHG

## 2019-11-22 DIAGNOSIS — I21.4 NSTEMI (NON-ST ELEVATED MYOCARDIAL INFARCTION) (HCC): ICD-10-CM

## 2019-11-22 DIAGNOSIS — I10 ESSENTIAL HYPERTENSION: ICD-10-CM

## 2019-11-22 DIAGNOSIS — I25.10 CORONARY ARTERY DISEASE INVOLVING NATIVE CORONARY ARTERY OF NATIVE HEART WITHOUT ANGINA PECTORIS: Primary | ICD-10-CM

## 2019-11-22 DIAGNOSIS — E78.2 MIXED HYPERLIPIDEMIA: ICD-10-CM

## 2019-11-22 DIAGNOSIS — I50.32 CHRONIC DIASTOLIC CHF (CONGESTIVE HEART FAILURE) (HCC): ICD-10-CM

## 2019-11-22 PROCEDURE — 99214 OFFICE O/P EST MOD 30 MIN: CPT | Performed by: NURSE PRACTITIONER

## 2019-11-22 PROCEDURE — 93000 ELECTROCARDIOGRAM COMPLETE: CPT | Performed by: NURSE PRACTITIONER

## 2019-11-22 RX ORDER — HYDRALAZINE HYDROCHLORIDE 25 MG/1
25 TABLET, FILM COATED ORAL 3 TIMES DAILY
COMMUNITY
End: 2020-03-02 | Stop reason: HOSPADM

## 2019-11-22 NOTE — PROGRESS NOTES
Date of Office Visit: 19  Encounter Provider: LEIDY Mejía  Place of Service: [facility] CARDIOLOGY  Patient Name: Dilip Verma  :1950    Chief Complaint   Patient presents with   • Coronary Artery Disease   • Follow-up   • Congestive Heart Failure   • Hypertension   :     HPI: Dilip Verma is a 69 y.o. male  with history of hypertension, hyperlipidemia, diabetes mellitus, coronary artery disease, congestive heart failure, thalamic hemorrhage, pulmonary hypertension, and obstructive sleep apnea.      He is followed by Dr. Hylton. I will see him for the first time today and have reviewed his medical record.     He was admitted to [facility] in 2019 for syncope.  He was orthostatic with supine hypertension.  Clonidine was discontinued and hydralazine was increased.  He then presented to [facility] on 2019 with uncontrolled blood pressure and blurry vision.  He had an abnormal BNP and troponin.  Echocardiogram showed an ejection fraction of 53%, moderate left ventricular hypertrophy, mild to moderate left atrial enlargement and no significant valvular disease.  He had a Holter monitor which showed nonsustained atrial tachycardia.  Due to his elevated troponin he had heart catheterization which showed normal ejection fraction with multivessel coronary artery disease.  Carotid duplex showed mild bilateral carotid artery stenosis.  Transthoracic echocardiogram showed normal left ventricular systolic function with an ejection fraction of 55%, grade 3 diastolic dysfunction, no significant valvular disease.  He did have severe pulmonary hypertension on that.  He underwent coronary artery bypass grafting with LIMA to LAD, vein graft to obtuse marginal 1, vein graft to distal right coronary artery.  He had some postoperative bradycardia and his beta-blocker was held.  He then had postoperative atrial fibrillation and was discharged on amiodarone.    He  "then was readmitted in early November 2019 with chest pain and non-STEMI.  Perfusion stress test suggested inferior wall and lateral wall ischemia.  Echocardiogram showed normal left ventricular systolic function with an EF of 62%, moderate left ventricular hypertrophy, grade 2 diastolic dysfunction and severe pulmonary hypertension.  Heart catheterization completed 11/6/2019 showed widely patent grafts.  His PA pressure was 80/30 with a wedge of 20.  He received IV diureses.  He had increased creatinine and BUN.  He was transition back to p.o. Lasix.  He was to follow-up outpatient with nephrology.  He then was in the emergency department for left shoulder pain and was diagnosed with radiculopathy.    He presents today for 3-month follow-up.  They have been following his weights at home which is 188-192 pounds.  He is short of breath only \"at times\".  He has not had any further swelling being maintained on Lasix.  They are following his blood pressure at home which is anywhere from 120-180 systolic.  When his blood pressure is 120s his wife does not give him this medication.  He is still not tolerating BiPAP.  However, he reports sleeping some better since starting trazodone.  He has a follow-up with Dr. Cai.  He denies chest pain tightness pressure, palpitation, dizziness, lightheadedness, near-syncope, or syncope.  He does have balance issues.  Currently is having labs checked weekly with nephrology who stopped his potassium replacement last week.  He has issues with erectile dysfunction is very concerned about that.      Allergies   Allergen Reactions   • Ativan [Lorazepam] Irritability   • Ace Inhibitors Angioedema   • Adhesive Tape Other (See Comments)     Ripped the skin off    • Losartan Angioedema     Angioneurotic edema   • Minoxidil Unknown (See Comments)   • Penicillin G Unknown (See Comments)   • Sulfa Antibiotics Other (See Comments)     Blisters in mouth   • Tetracycline Other (See Comments)     " "bliisters in mouth         Past Medical History:   Diagnosis Date   • Acute diastolic (congestive) heart failure (CMS/[facility])    • Anxiety    • Chest pain    • Chronic kidney disease     stones- had lithotripsy   • Colon polyp    • Coronary artery disease     CABG 7/2019   • Diabetes mellitus, type II (CMS/[facility])    • Erectile dysfunction    • H/O bone density study never   • Hyperlipidemia    • Hypersomnia    • Hypertension    • Kidney stone    • NSTEMI (non-ST elevated myocardial infarction) (CMS/[facility])    • Orthostasis    • Periodic breathing    • Routine eye exam 2015   • Sleep apnea     bipap   • Stroke (CMS/[facility])     \"slight stroke\"       Past Surgical History:   Procedure Laterality Date   • CARDIAC CATHETERIZATION N/A 7/15/2019    Procedure: Coronary angiography;  Surgeon: Carrie Price MD;  Location:  [facility] CATH INVASIVE LOCATION;  Service: Cardiovascular   • CARDIAC CATHETERIZATION N/A 7/15/2019    Procedure: Left Heart Cath;  Surgeon: Carrie Price MD;  Location: [facility] C[facility];  Service: Cardiovascular   • CARDIAC CATHETERIZATION N/A 7/15/2019    Procedure: Left ventriculography;  Surgeon: Carrie Price MD;  [facility];  Service: Cardiovascular   • CARDIAC CATHETERIZATION  7/15/2019    Procedure: Functional Flow [place];  Surgeon: Carrie Price MD;  [facility];  Service: Cardiovascular   • CARDIAC CATHETERIZATION N/A 11/6/2019    Procedure: Right and Left Heart Cath;  Surgeon: Mya Smith MD;  Location: [facility] CATH INVASIVE LOCATION;  Service: Cardiovascular   • CARDIAC CATHETERIZATION N/A 11/6/2019    Procedure: Coronary angiography;  Surgeon: Mya Smith MD;  Location: [facility] CATH INVASIVE LOCATION;  Service: Cardiovascular   • CATARACT EXTRACTION EXTRACAPSULAR W/ INTRAOCULAR LENS IMPLANTATION Bilateral    • COLONOSCOPY  01/2015    dr [email or web address]   • CORONARY ARTERY BYPASS GRAFT N/A 7/18/2019    Procedure: INTRAOPERATIVE SHOAIB; " "STERNOTOMY CORONARY ARTERY BYPASS x 3  USING LEFT INTERNAL MAMMARY ARTERY GRAFT UTILIZING ENDOSCOPICALLY HARVESTED RIGHT GREATER SAPHENOUS VEIN AND PRP.;  Surgeon: Bill Devi MD;  Location: [facility];  Service: Cardiothoracic   • DENTAL PROCEDURE      3 surgeries inder implants   • HERNIA REPAIR      inguinal bilaterally   • KIDNEY STONE SURGERY      lithotripsy         Family and social history reviewed.     Review of Systems   Constitution: Positive for malaise/fatigue.   Musculoskeletal: Positive for falls.   Genitourinary:        ED   Neurological: Positive for focal weakness (right upper extremity) and loss of balance.     All other systems were reviewed and are negative          Objective:     Vitals:    11/22/19 1447   BP: 150/70   BP Location: Left arm   Patient Position: Sitting   Pulse: 71   Weight: 86.2 kg (190 lb)   Height: 175.3 cm (69\")     Body mass index is 28.06 kg/m².    PHYSICAL EXAM:  Physical Exam   Constitutional: He is oriented to person, place, and time. He appears well-developed and well-nourished. No distress.   HENT:   Head: Normocephalic.   Eyes: Conjunctivae are normal.   Neck: Normal range of motion. No JVD present.   Cardiovascular: Normal rate, regular rhythm, normal heart sounds and intact distal pulses.   No murmur heard.  Pulses:       Carotid pulses are 2+ on the right side, and 2+ on the left side.       Radial pulses are 2+ on the right side, and 2+ on the left side.        Posterior tibial pulses are 2+ on the right side, and 2+ on the left side.   Pulmonary/Chest: Effort normal and breath sounds normal. No respiratory distress. He has no wheezes. He has no rhonchi. He has no rales. He exhibits no tenderness.   Abdominal: Soft. Bowel sounds are normal. He exhibits no distension.   Musculoskeletal: Normal range of motion. He exhibits no edema.   Flaccid RUE    Ambulates with walker   Neurological: He is alert and oriented to person, place, and time.   Skin: Skin is " warm, dry and intact. No rash noted. He is not diaphoretic. No cyanosis.   Psychiatric: He has a normal mood and affect. His behavior is normal. Judgment and thought content normal.         ECG 12 Lead  Date/Time: 11/22/2019 4:24 PM  Performed by: Danii Dennison APRN  Authorized by: Danii Dennison APRN   Comparison: compared with previous ECG   Similar to previous ECG  Rhythm: sinus rhythm  Rate: normal  T inversion: II, III, aVF, V4, V5 and V6    Clinical impression: abnormal EKG            Current Outpatient Medications   Medication Sig Dispense Refill   • Cholecalciferol (VITAMIN D PO) Take 5,000 Units by mouth Daily.     • furosemide (LASIX) 40 MG tablet Take 1 tablet by mouth 2 (Two) Times a Day. 60 tablet 11   • gabapentin (NEURONTIN) 300 MG capsule Take 1 capsule by mouth Every Night. (Patient taking differently: Take 300 mg by mouth At Night As Needed.) 30 capsule 5   • hydrALAZINE (APRESOLINE) 25 MG tablet Take 25 mg by mouth 2 (Two) Times a Day.     • insulin degludec (TRESIBA FLEXTOUCH) 100 UNIT/ML solution pen-injector injection Inject 25 Units under the skin into the appropriate area as directed Daily With Dinner.     • insulin lispro (HUMALOG) 100 UNIT/ML injection Inject  under the skin into the appropriate area as directed 3 (Three) Times a Day Before Meals. PER SS -  Currently taking 4 units at noon and 7 units in pm plus 2 units for -250, 4 units for -300, 6 units for -350     • melatonin 3 MG tablet Take 10 mg by mouth Every Night.     • metoprolol tartrate (LOPRESSOR) 25 MG tablet Take 0.5 tablets by mouth Every 12 (Twelve) Hours. 30 tablet 11   • pramipexole (MIRAPEX) 0.25 MG tablet Take 0.25 mg by mouth 2 (Two) Times a Day As Needed (restless leg).     • tamsulosin (FLOMAX) 0.4 MG capsule 24 hr capsule Take 1 capsule by mouth 2 (Two) Times a Day. Wife states he takes it when he has trouble voiding, Also taking for high blood pressure.     • traZODone (DESYREL) 50 MG tablet  TAKE 1 TABLET BY MOUTH ONE TIME A DAY AT NIGHT 30 tablet 0   • aspirin  MG EC tablet Take 1 tablet by mouth Daily. 30 tablet    • metaxalone (SKELAXIN) 800 MG tablet Take 1 tablet by mouth 3 (Three) Times a Day As Needed for Muscle Spasms. 15 tablet 0     No current facility-administered medications for this visit.      Assessment:       Diagnosis Plan   1. Coronary artery disease involving native coronary artery of native heart without angina pectoris     2. Essential hypertension     3. Mixed hyperlipidemia     4. Chronic diastolic CHF (congestive heart failure) (CMS/[facility])     5. NSTEMI (non-ST elevated myocardial infarction) (CMS/[facility])          Orders Placed This Encounter   Procedures   • ECG 12 Lead     This order was created via procedure documentation         Plan:   1.  69-year-old gentleman with coronary artery disease status post coronary artery bypass grafting times 3 July 2019 with LIMA to LAD, vein graft to obtuse marginal 1, vein graft to distal right coronary artery with normal left ventricular systolic function.  Then non-STEMI and  inferolateral wall ischemia on perfusion stress test.  Cardiac catheterization showed patent grafts normal left ventricular systolic function.  Coronary Artery Disease  Assessment  • The patient has no angina    Subjective - Objective  • There is a history of past MI on or around 11/5/2019  • There is a history of previous coronary artery bypass graft on or around 7/18/2019  • Current antiplatelet therapy includes aspirin 81 mg      2.  Hypertension blood pressure is labile.  Emphasized to his wife to give his blood pressure medication.  Discussed too low would be a systolic value 100 or less.  I did not change any of his medicines today  3.  Hyperlipidemia- unclear why he is not on statin therapy  4.  Diabetes mellitus on insulin  5.  Obstructive sleep apnea still having difficulty tolerating BiPAP.  Follows with Dr. Cai reports improved sleeping on  trazodone  6.  Pulmonary hypertension  7.  Postoperative atrial fibrillation- no known recurrence  8.  Bilateral carotid artery stenosis mild on duplex July 2019  9.  Grade 3 diastolic dysfunction on echocardiogram July 2019  10.Remote history of thalamic hemorrhage noted on MRI July 2019  11.History of hypogonadism with prior testosterone replacement treatment.  From a cardiac standpoint would not recommend that.  12.  Chronic diastolic congestive heart failure normal ejection fraction on echo November 2019 weights have been stable on 40 mg Lasix twice daily.  13.  Chronic kidney disease follows with nephrology who is currently watching weekly labs due to recent hyperkalemia due to be rechecked next week  14.  Balance issues they are to follow-up with his PCP to be referred to physical therapy before resuming cardiac rehab    Follow-up in 2 months call with questions or concerns            It has been a pleasure to participate in this patient's care.      Thank you,  LEIDY Mejía      **I used Dragon to dictate this note:**

## 2019-11-29 ENCOUNTER — APPOINTMENT (OUTPATIENT)
Dept: SPEECH THERAPY | Facility: HOSPITAL | Age: 69
End: 2019-11-29

## 2019-12-06 ENCOUNTER — APPOINTMENT (OUTPATIENT)
Dept: SPEECH THERAPY | Facility: HOSPITAL | Age: 69
End: 2019-12-06

## 2019-12-13 ENCOUNTER — APPOINTMENT (OUTPATIENT)
Dept: SPEECH THERAPY | Facility: HOSPITAL | Age: 69
End: 2019-12-13

## 2019-12-18 ENCOUNTER — APPOINTMENT (OUTPATIENT)
Dept: SLEEP MEDICINE | Facility: HOSPITAL | Age: 69
End: 2019-12-18

## 2019-12-20 ENCOUNTER — APPOINTMENT (OUTPATIENT)
Dept: SPEECH THERAPY | Facility: HOSPITAL | Age: 69
End: 2019-12-20

## 2019-12-20 RX ORDER — TRAZODONE HYDROCHLORIDE 50 MG/1
TABLET ORAL
Qty: 30 TABLET | Refills: 0 | Status: SHIPPED | OUTPATIENT
Start: 2019-12-20 | End: 2020-01-10

## 2019-12-27 ENCOUNTER — APPOINTMENT (OUTPATIENT)
Dept: SPEECH THERAPY | Facility: HOSPITAL | Age: 69
End: 2019-12-27

## 2020-01-03 ENCOUNTER — APPOINTMENT (OUTPATIENT)
Dept: SPEECH THERAPY | Facility: HOSPITAL | Age: 70
End: 2020-01-03

## 2020-01-10 ENCOUNTER — OFFICE VISIT (OUTPATIENT)
Dept: FAMILY MEDICINE CLINIC | Facility: CLINIC | Age: 70
End: 2020-01-10

## 2020-01-10 ENCOUNTER — APPOINTMENT (OUTPATIENT)
Dept: SPEECH THERAPY | Facility: HOSPITAL | Age: 70
End: 2020-01-10

## 2020-01-10 VITALS
OXYGEN SATURATION: 97 % | SYSTOLIC BLOOD PRESSURE: 124 MMHG | HEIGHT: 69 IN | TEMPERATURE: 98.2 F | DIASTOLIC BLOOD PRESSURE: 78 MMHG | WEIGHT: 200 LBS | HEART RATE: 82 BPM | BODY MASS INDEX: 29.62 KG/M2

## 2020-01-10 DIAGNOSIS — I10 HYPERTENSION, ESSENTIAL: ICD-10-CM

## 2020-01-10 DIAGNOSIS — Z11.59 ENCOUNTER FOR HEPATITIS C SCREENING TEST FOR LOW RISK PATIENT: ICD-10-CM

## 2020-01-10 DIAGNOSIS — Z00.00 MEDICARE ANNUAL WELLNESS VISIT, SUBSEQUENT: Primary | ICD-10-CM

## 2020-01-10 DIAGNOSIS — E55.9 VITAMIN D DEFICIENCY, UNSPECIFIED: ICD-10-CM

## 2020-01-10 DIAGNOSIS — D64.9 ANEMIA, UNSPECIFIED TYPE: ICD-10-CM

## 2020-01-10 DIAGNOSIS — R79.89 LOW VITAMIN D LEVEL: ICD-10-CM

## 2020-01-10 DIAGNOSIS — E78.5 HYPERLIPIDEMIA, UNSPECIFIED HYPERLIPIDEMIA TYPE: ICD-10-CM

## 2020-01-10 DIAGNOSIS — R79.89 CREATININE ELEVATION: ICD-10-CM

## 2020-01-10 LAB
25(OH)D3 SERPL-MCNC: 26.9 NG/ML (ref 30–100)
ALBUMIN SERPL-MCNC: 4.3 G/DL (ref 3.5–5.2)
ALBUMIN/GLOB SERPL: 1.3 G/DL
ALP SERPL-CCNC: 71 U/L (ref 39–117)
ALT SERPL W P-5'-P-CCNC: 20 U/L (ref 1–41)
ANION GAP SERPL CALCULATED.3IONS-SCNC: 14 MMOL/L (ref 5–15)
AST SERPL-CCNC: 20 U/L (ref 1–40)
BASOPHILS # BLD AUTO: 0.03 10*3/MM3 (ref 0–0.2)
BASOPHILS NFR BLD AUTO: 0.4 % (ref 0–1.5)
BILIRUB SERPL-MCNC: 0.8 MG/DL (ref 0.2–1.2)
BUN BLD-MCNC: 21 MG/DL (ref 8–23)
BUN/CREAT SERPL: 17.1 (ref 7–25)
CALCIUM SPEC-SCNC: 9.4 MG/DL (ref 8.6–10.5)
CHLORIDE SERPL-SCNC: 99 MMOL/L (ref 98–107)
CO2 SERPL-SCNC: 28 MMOL/L (ref 22–29)
CREAT BLD-MCNC: 1.23 MG/DL (ref 0.76–1.27)
DEPRECATED RDW RBC AUTO: 45.1 FL (ref 37–54)
EOSINOPHIL # BLD AUTO: 0.15 10*3/MM3 (ref 0–0.4)
EOSINOPHIL NFR BLD AUTO: 1.8 % (ref 0.3–6.2)
ERYTHROCYTE [DISTWIDTH] IN BLOOD BY AUTOMATED COUNT: 14.2 % (ref 12.3–15.4)
GFR SERPL CREATININE-BSD FRML MDRD: 58 ML/MIN/1.73
GLOBULIN UR ELPH-MCNC: 3.2 GM/DL
GLUCOSE BLD-MCNC: 230 MG/DL (ref 65–99)
HCT VFR BLD AUTO: 42.6 % (ref 37.5–51)
HCV AB SER DONR QL: NORMAL
HGB BLD-MCNC: 14.6 G/DL (ref 13–17.7)
IMM GRANULOCYTES # BLD AUTO: 0.02 10*3/MM3 (ref 0–0.05)
IMM GRANULOCYTES NFR BLD AUTO: 0.2 % (ref 0–0.5)
LYMPHOCYTES # BLD AUTO: 1.16 10*3/MM3 (ref 0.7–3.1)
LYMPHOCYTES NFR BLD AUTO: 14.1 % (ref 19.6–45.3)
MCH RBC QN AUTO: 30.2 PG (ref 26.6–33)
MCHC RBC AUTO-ENTMCNC: 34.3 G/DL (ref 31.5–35.7)
MCV RBC AUTO: 88 FL (ref 79–97)
MONOCYTES # BLD AUTO: 0.52 10*3/MM3 (ref 0.1–0.9)
MONOCYTES NFR BLD AUTO: 6.3 % (ref 5–12)
NEUTROPHILS # BLD AUTO: 6.37 10*3/MM3 (ref 1.7–7)
NEUTROPHILS NFR BLD AUTO: 77.2 % (ref 42.7–76)
NRBC BLD AUTO-RTO: 0 /100 WBC (ref 0–0.2)
PLATELET # BLD AUTO: 223 10*3/MM3 (ref 140–450)
PMV BLD AUTO: 10.3 FL (ref 6–12)
POTASSIUM BLD-SCNC: 4.6 MMOL/L (ref 3.5–5.2)
PROT SERPL-MCNC: 7.5 G/DL (ref 6–8.5)
RBC # BLD AUTO: 4.84 10*6/MM3 (ref 4.14–5.8)
SODIUM BLD-SCNC: 141 MMOL/L (ref 136–145)
WBC NRBC COR # BLD: 8.25 10*3/MM3 (ref 3.4–10.8)

## 2020-01-10 PROCEDURE — G0439 PPPS, SUBSEQ VISIT: HCPCS | Performed by: INTERNAL MEDICINE

## 2020-01-10 PROCEDURE — 85025 COMPLETE CBC W/AUTO DIFF WBC: CPT | Performed by: INTERNAL MEDICINE

## 2020-01-10 PROCEDURE — 80053 COMPREHEN METABOLIC PANEL: CPT | Performed by: INTERNAL MEDICINE

## 2020-01-10 PROCEDURE — 82306 VITAMIN D 25 HYDROXY: CPT | Performed by: INTERNAL MEDICINE

## 2020-01-10 PROCEDURE — 99214 OFFICE O/P EST MOD 30 MIN: CPT | Performed by: INTERNAL MEDICINE

## 2020-01-10 PROCEDURE — 36415 COLL VENOUS BLD VENIPUNCTURE: CPT | Performed by: INTERNAL MEDICINE

## 2020-01-10 PROCEDURE — 86803 HEPATITIS C AB TEST: CPT | Performed by: INTERNAL MEDICINE

## 2020-01-10 RX ORDER — TESTOSTERONE GEL, 1% 10 MG/G
50 GEL TRANSDERMAL 3 TIMES WEEKLY
COMMUNITY
End: 2020-12-07 | Stop reason: HOSPADM

## 2020-01-10 RX ORDER — TRAZODONE HYDROCHLORIDE 100 MG/1
100 TABLET ORAL NIGHTLY PRN
Qty: 30 TABLET | Refills: 2 | Status: SHIPPED | OUTPATIENT
Start: 2020-01-10 | End: 2020-03-06

## 2020-01-10 NOTE — PATIENT INSTRUCTIONS
Medicare Wellness  Personal Prevention Plan of Service     Date of Office Visit:  01/10/2020  Encounter Provider:  Vishal Adrian MD  Place of Service:  [facility] FAM[facility]  Patient Name: Dilip Verma  :  1950    As part of the Medicare Wellness portion of your visit today, we are providing you with this personalized preventive plan of services (PPPS). This plan is based upon recommendations of the [place] Preventive Services Task Force (USPSTF) and the Advisory Committee on Immunization Practices (ACIP).    This lists the preventive care services that should be considered, and provides dates of when you are due. Items listed as completed are up-to-date and do not require any further intervention.    Health Maintenance   Topic Date Due   • TDAP/TD VACCINES (1 - Tdap) 1960   • ZOSTER VACCINE (1 of 2) 1999   • PNEUMOCOCCAL VACCINE (65+ HIGH RISK) (1 of 2 - PCV13) 2014   • HEPATITIS C SCREENING  2016   • MEDICARE ANNUAL WELLNESS  2016   • DIABETIC FOOT EXAM  2016   • DIABETIC EYE EXAM  2016   • URINE MICROALBUMIN  2016   • PROSTATE CANCER SCREENING  2016   • INFLUENZA VACCINE  2019   • HEMOGLOBIN A1C  2020   • LIPID PANEL  2020   • COLONOSCOPY  2025   • AAA SCREEN (ONE-TIME)  Completed       No orders of the defined types were placed in this encounter.      No follow-ups on file.

## 2020-01-10 NOTE — PROGRESS NOTES
The ABCs of the Annual Wellness Visit  Subsequent Medicare Wellness Visit    Chief Complaint   Patient presents with   • Follow-up     ER   • testicles soft   • Erectile Dysfunction       Subjective   History of Present Illness:  Dilip Verma is a 69 y.o. male who presents for a Subsequent Medicare Wellness Visit.    HEALTH RISK ASSESSMENT    Recent Hospitalizations:  Recently treated at the following:  [facility]    Current Medical Providers:  Patient Care Team:  Vishal Adrian Jr., MD as PCP - General (Internal Medicine)  Morris Cai MD as Consulting Physician (Sleep Medicine)  Michaela Hylton MD as Consulting Physician (Cardiology)  Hardy Banerjee MD as Consulting Physician (Ophthalmology)  Chula Christianson MD as Consulting Physician (Ophthalmology)  Jason Sherman MD (Endocrinology)  Perla Mayen MD as Consulting Physician (Nephrology)    Smoking Status:  Social History     Tobacco Use   Smoking Status Former Smoker   • Packs/day: 0.75   • Years: 16.00   • Pack years: 12.00   • Last attempt to quit:    • Years since quittin.0   Smokeless Tobacco Never Used   Tobacco Comment    Start age:39/Stopping age:47, 3/4 PPD       Alcohol Consumption:  Social History     Substance and Sexual Activity   Alcohol Use No    Comment: caffeine use - 1 cup daily       Depression Screen:   No flowsheet data found.    Fall Risk Screen:  STEADI Fall Risk Assessment has not been completed.    Health Habits and Functional and Cognitive Screening:  No flowsheet data found.      Does the patient have evidence of cognitive impairment? No    Asprin use counseling:Taking ASA appropriately as indicated    Age-appropriate Screening Schedule:  Refer to the list below for future screening recommendations based on patient's age, sex and/or medical conditions. Orders for these recommended tests are listed in the plan section. The patient has been provided with a written plan.    Health  Maintenance   Topic Date Due   • TDAP/TD VACCINES (1 - Tdap) 03/29/1960   • ZOSTER VACCINE (1 of 2) 03/29/1999   • PNEUMOCOCCAL VACCINE (65+ HIGH RISK) (1 of 2 - PCV13) 03/29/2014   • DIABETIC FOOT EXAM  02/08/2016   • DIABETIC EYE EXAM  02/08/2016   • URINE MICROALBUMIN  05/12/2016   • PROSTATE CANCER SCREENING  07/19/2016   • INFLUENZA VACCINE  08/01/2019   • HEMOGLOBIN A1C  01/07/2020   • LIPID PANEL  07/13/2020   • COLONOSCOPY  01/02/2025          The following portions of the patient's history were reviewed and updated as appropriate: allergies, current medications, past family history, past medical history, past social history, past surgical history and problem list.    Outpatient Medications Prior to Visit   Medication Sig Dispense Refill   • aspirin  MG EC tablet Take 1 tablet by mouth Daily. (Patient taking differently: Take 81 mg by mouth Daily.) 30 tablet    • Cholecalciferol (VITAMIN D PO) Take 5,000 Units by mouth Daily.     • furosemide (LASIX) 40 MG tablet Take 1 tablet by mouth 2 (Two) Times a Day. 60 tablet 11   • gabapentin (NEURONTIN) 300 MG capsule Take 1 capsule by mouth Every Night. (Patient taking differently: Take 300 mg by mouth At Night As Needed.) 30 capsule 5   • hydrALAZINE (APRESOLINE) 25 MG tablet Take 25 mg by mouth 2 (Two) Times a Day.     • insulin degludec (TRESIBA FLEXTOUCH) 100 UNIT/ML solution pen-injector injection Inject 25 Units under the skin into the appropriate area as directed Daily With Dinner.     • insulin lispro (HUMALOG) 100 UNIT/ML injection Inject  under the skin into the appropriate area as directed 3 (Three) Times a Day Before Meals. PER SS -  Currently taking 4 units at noon and 7 units in pm plus 2 units for -250, 4 units for -300, 6 units for -350     • melatonin 3 MG tablet Take 10 mg by mouth Every Night.     • metoprolol tartrate (LOPRESSOR) 25 MG tablet Take 0.5 tablets by mouth Every 12 (Twelve) Hours. 30 tablet 11   • pramipexole  (MIRAPEX) 0.25 MG tablet Take 0.25 mg by mouth 2 (Two) Times a Day As Needed (restless leg).     • tamsulosin (FLOMAX) 0.4 MG capsule 24 hr capsule Take 1 capsule by mouth 2 (Two) Times a Day. Wife states he takes it when he has trouble voiding, Also taking for high blood pressure.     • traZODone (DESYREL) 50 MG tablet TAKE 1 TABLET BY MOUTH ONE TIME A DAY AT NIGHT 30 tablet 0   • metaxalone (SKELAXIN) 800 MG tablet Take 1 tablet by mouth 3 (Three) Times a Day As Needed for Muscle Spasms. 15 tablet 0   • testosterone (ANDROGEL) 50 MG/5GM (1%) gel gel Daily.       No facility-administered medications prior to visit.        Patient Active Problem List   Diagnosis   • Anxiety   • Colon polyp   • Diabetes mellitus, type II (CMS/HCC)   • Erectile dysfunction   • Hyperlipidemia   • Essential hypertension   • Obstructive sleep apnea on autoBiPAP   • NSTEMI (non-ST elevated myocardial infarction) (CMS/HCC)   • CAD (coronary artery disease)   • Hypertensive urgency   • Acute diastolic (congestive) heart failure (CMS/HCC)   • Orthostasis   • Hx of CABG   • Chronic diastolic CHF (congestive heart failure) (CMS/HCC)   • Hypersomnia due to medical condition   • CSA (central sleep apnea)   • Periodic breathing       Advanced Care Planning:  Patient does not have an advance directive - not interested in additional information    Review of Systems   Constitutional: Positive for fatigue.   Respiratory: Positive for shortness of breath.    All other systems reviewed and are negative.      Compared to one year ago, the patient feels his physical health is better.  Compared to one year ago, the patient feels his mental health is the same.    Reviewed chart for potential of high risk medication in the elderly: not applicable  Reviewed chart for potential of harmful drug interactions in the elderly:not applicable    Objective         Vitals:    01/10/20 1405   BP: 124/78   BP Location: Left arm   Patient Position: Sitting   Cuff Size:  "Adult   Pulse: 82   Temp: 98.2 °F (36.8 °C)   TempSrc: Oral   SpO2: 97%   Weight: 90.7 kg (200 lb)   Height: 175.3 cm (69\")   PainSc: 0-No pain       Body mass index is 29.53 kg/m².  Discussed the patient's BMI with him. The BMI is above average; BMI management plan is completed.    Physical Exam          Assessment/Plan   Medicare Risks and Personalized Health Plan  CMS Preventative Services Quick Reference  Advance Directive Discussion  Diabetic Lab Screening   Immunizations Discussed/Encouraged (specific immunizations; adacel Tdap, Hepatitis A Vaccine/Series, Influenza, Pneumococcal 23, Prevnar and Shingrix )  Prostate Cancer Screening     The above risks/problems have been discussed with the patient.  Pertinent information has been shared with the patient in the After Visit Summary.  Follow up plans and orders are seen below in the Assessment/Plan Section.    There are no diagnoses linked to this encounter.  Follow Up:  No follow-ups on file.     An After Visit Summary and PPPS were given to the patient.             "

## 2020-01-10 NOTE — PROGRESS NOTES
Subjective   Dilip Verma is a 69 y.o. male.  Medicare wellness visit subsequent follow-up on blood pressure and lipids as well  Body mass index is 29.53 kg/m².  History of Present Illness   Medicare wellness visit subsequent follow-up on blood pressure creatinine elevation hypertension elevated lipids is not had a hepatitis C screening which we will do low vitamin D which we will check as well history of anemia overall patient is doing fairly well is instructed in Viagra type medication for ED he has had cardiac issues gets prior CABG this year subsequent mission with chest pain evaluation with elevated troponin is currently not taking daily nitroglycerin we will check with patient's cardiologist before prescribing any Viagra type medication for him.  Overall he feels pretty good no complaints of chest pain energy level is good as well.  Drug allergies are several including Ativan causing irritability adhesive tape ACE inhibitor is causing angioedema losartan causing angioedema neck still undefined PNG sulfa tetracycline former smoker quit in 2000.  Family is positive for depression cancer undefined arrhythmia heart disease hypertension kidney disease thyroid disease depression alcohol abuse lung disease glaucoma and stroke  Review of Systems   All other systems reviewed and are negative.      Objective   Vitals:    01/10/20 1405   BP: 124/78   Pulse: 82   Temp: 98.2 °F (36.8 °C)   SpO2: 97%   Weight: 90.7 kg (200 lb)     Physical Exam   Constitutional: He appears well-developed and well-nourished.   HENT:   Head: Normocephalic and atraumatic.   Eyes: Pupils are equal, round, and reactive to light. Conjunctivae are normal.   Cardiovascular: Normal rate and regular rhythm.   Pulmonary/Chest: Effort normal and breath sounds normal.   Abdominal: Soft. Bowel sounds are normal.   Neurological: He is alert.   Overall stable gait and station somewhat having limp secondary to stroke   Skin: Skin is warm and dry.    Nursing note and vitals reviewed.      Lab Results   Component Value Date    INR 1.48 (H) 07/19/2019    INR 1.63 (H) 07/18/2019    INR 1.75 (H) 07/18/2019       Procedures    Assessment/Plan .  Medicare wellness visit subsequent    2.  Hyperlipidemia await pending lab    3.  Hypertension controlled continue present medication recheck in 6 months    4.  Creatinine elevation await pending lab    5.  Low vitamin D get updated values    6.  Encounter for hepatitis C screening not high risk although does have a friend of MM with see note her exposures patient is aware of will await lab    7.  Anemia get updated values there  Patient does have reduced testiclesize by description he has both 69 years old i and has been on testosterone replacement which is logical consequence he does have diabetes followed by his endocrinologist.     Much of this encounter note is an electronic transcription/translation of spoken language to printed text.  The electronic translation of spoken language may permit erroneous, or at times, nonsensical words or phrases to be inadvertently transcribed.  Although I have reviewed the note for such errors, some may still exist. If there are questions or for further clarification, please contact me.      intrtested  In viagra

## 2020-01-13 DIAGNOSIS — E55.9 VITAMIN D DEFICIENCY: Primary | ICD-10-CM

## 2020-01-17 ENCOUNTER — APPOINTMENT (OUTPATIENT)
Dept: SPEECH THERAPY | Facility: HOSPITAL | Age: 70
End: 2020-01-17

## 2020-01-22 ENCOUNTER — OFFICE VISIT (OUTPATIENT)
Dept: CARDIOLOGY | Facility: CLINIC | Age: 70
End: 2020-01-22

## 2020-01-22 VITALS
BODY MASS INDEX: 29.62 KG/M2 | SYSTOLIC BLOOD PRESSURE: 160 MMHG | HEIGHT: 69 IN | HEART RATE: 71 BPM | DIASTOLIC BLOOD PRESSURE: 68 MMHG | WEIGHT: 200 LBS

## 2020-01-22 DIAGNOSIS — E11.9 TYPE 2 DIABETES MELLITUS WITHOUT COMPLICATION, UNSPECIFIED WHETHER LONG TERM INSULIN USE (HCC): ICD-10-CM

## 2020-01-22 DIAGNOSIS — I50.32 CHRONIC DIASTOLIC CHF (CONGESTIVE HEART FAILURE) (HCC): ICD-10-CM

## 2020-01-22 DIAGNOSIS — I25.10 CORONARY ARTERY DISEASE INVOLVING NATIVE CORONARY ARTERY OF NATIVE HEART WITHOUT ANGINA PECTORIS: Primary | ICD-10-CM

## 2020-01-22 DIAGNOSIS — Z95.1 HX OF CABG: ICD-10-CM

## 2020-01-22 DIAGNOSIS — I10 ESSENTIAL HYPERTENSION: ICD-10-CM

## 2020-01-22 DIAGNOSIS — E78.2 MIXED HYPERLIPIDEMIA: ICD-10-CM

## 2020-01-22 PROCEDURE — 99214 OFFICE O/P EST MOD 30 MIN: CPT | Performed by: NURSE PRACTITIONER

## 2020-01-22 PROCEDURE — 93000 ELECTROCARDIOGRAM COMPLETE: CPT | Performed by: NURSE PRACTITIONER

## 2020-01-22 RX ORDER — VITAMIN B COMPLEX
CAPSULE ORAL DAILY
COMMUNITY
End: 2020-12-01

## 2020-01-22 RX ORDER — ASPIRIN 81 MG/1
81 TABLET ORAL DAILY
COMMUNITY

## 2020-01-22 NOTE — PROGRESS NOTES
Date of Office Visit: 20  Encounter Provider: LEIDY Mejía  Place of Service: [facility] CARDIOLOGY  Patient Name: Dilip Verma  :1950    Chief Complaint   Patient presents with   • Coronary Artery Disease   • Congestive Heart Failure   • Follow-up   :     HPI: Dilip Verma is a 69 y.o. male  with history of hypertension, hyperlipidemia, diabetes mellitus, coronary artery disease, congestive heart failure, thalamic hemorrhage, pulmonary hypertension, and obstructive sleep apnea. He is followed by Dr. Hylton. I will see him in follow up today.       He was admitted to [facility] in 2019 for syncope.  He was orthostatic with supine hypertension.  Clonidine was discontinued and hydralazine was increased.  He then presented to [facility] on 2019 with uncontrolled blood pressure and blurry vision.  He had an abnormal BNP and troponin.  Echocardiogram showed an ejection fraction of 53%, moderate left ventricular hypertrophy, mild to moderate left atrial enlargement and no significant valvular disease.  He had a Holter monitor which showed nonsustained atrial tachycardia.  Due to his elevated troponin he had heart catheterization which showed normal ejection fraction with multivessel coronary artery disease.  Carotid duplex showed mild bilateral carotid artery stenosis.  Transthoracic echocardiogram showed normal left ventricular systolic function with an ejection fraction of 55%, grade 3 diastolic dysfunction, no significant valvular disease.  He did have severe pulmonary hypertension on that.  He underwent coronary artery bypass grafting with LIMA to LAD, vein graft to obtuse marginal 1, vein graft to distal right coronary artery.  He had some postoperative bradycardia and his beta-blocker was held.  He then had postoperative atrial fibrillation and was discharged on amiodarone.     He then was readmitted in early 2019 with chest pain and  non-STEMI.  Perfusion stress test suggested inferior wall and lateral wall ischemia.  Echocardiogram showed normal left ventricular systolic function with an EF of 62%, moderate left ventricular hypertrophy, grade 2 diastolic dysfunction and severe pulmonary hypertension.  Heart catheterization completed 11/6/2019 showed widely patent grafts.  His PA pressure was 80/30 with a wedge of 20.  He received IV diureses.  He had increased creatinine and BUN.  He was transition back to p.o. Lasix.  He was to follow-up outpatient with nephrology.  He then was in the emergency department for left shoulder pain and was diagnosed with radiculopathy.  He had some insomnia and sleep disturbance which improved after starting trazodone.  That he was not tolerating BiPAP well.  His nephrologist later stop potassium.    He presents today in follow-up accompanied by his wife.  They have been checking his blood pressure at home which continues to be labile anywhere from low 100s up to 160.  He takes 1 extra Lasix tablet once a week for weight gain above 2 pounds overnight or 5 pounds in a week.  He has had no real change in his shortness of breath.  He denies chest pain tightness pressure, palpitation, orthopnea or PND.  He continues to have lower extremity edema which is also been stable.        Allergies   Allergen Reactions   • Ativan [Lorazepam] Irritability   • Ace Inhibitors Angioedema   • Adhesive Tape Other (See Comments)     Ripped the skin off    • Losartan Angioedema     Angioneurotic edema   • Minoxidil Unknown (See Comments)   • Penicillin G Unknown (See Comments)   • Sulfa Antibiotics Other (See Comments)     Blisters in mouth   • Tetracycline Other (See Comments)     bliisters in mouth         Past Medical History:   Diagnosis Date   • Acute diastolic (congestive) heart failure (CMS/HCC)    • Anxiety    • Chest pain    • Chronic kidney disease     stones- had lithotripsy   • Colon polyp    • Coronary artery disease      "CABG 7/2019   • Diabetes mellitus, type II (CMS/[facility])    • Erectile dysfunction    • H/O bone density study never   • Hyperlipidemia    • Hypersomnia    • Hypertension    • Kidney stone    • NSTEMI (non-ST elevated myocardial infarction) (CMS/[facility])    • Orthostasis    • Periodic breathing    • Routine eye exam 2015   • Sleep apnea     bipap   • Stroke (CMS/[facility])     \"slight stroke\"       Past Surgical History:   Procedure Laterality Date   • CARDIAC CATHETERIZATION N/A 7/15/2019    Procedure: Coronary angiography;  Surgeon: Carrie Price MD;  Location:  [facility] CATH INVASIVE LOCATION;  Service: Cardiovascular   • CARDIAC CATHETERIZATION N/A 7/15/2019    Procedure: Left Heart Cath;  Surgeon: Carrie Price MD;  Location:  [facility];  Service: Cardiovascular   • CARDIAC CATHETERIZATION N/A 7/15/2019    Procedure: Left ventriculography;  Surgeon: Carrie Price MD;  Location[facility];  Service: Cardiovascular   • CARDIAC CATHETERIZATION  7/15/2019    Procedure: Functional Flow [place];  Surgeon: Carrie Price MD;  [facility];  Service: Cardiovascular   • CARDIAC CATHETERIZATION N/A 11/6/2019    Procedure: Right and Left Heart Cath;  Surgeon: Mya Smith MD;  [facility];  Service: Cardiovascular   • CARDIAC CATHETERIZATION N/A 11/6/2019    Procedure: Coronary angiography;  Surgeon: Mya Smith MD;  [facility];  Service: Cardiovascular   • CATARACT EXTRACTION EXTRACAPSULAR W/ INTRAOCULAR LENS IMPLANTATION Bilateral    • COLONOSCOPY  01/2015    dr [email or web address]   • CORONARY ARTERY BYPASS GRAFT N/A 7/18/2019    Procedure: INTRAOPERATIVE SHOAIB; STERNOTOMY CORONARY ARTERY BYPASS x 3  USING LEFT INTERNAL MAMMARY ARTERY GRAFT UTILIZING ENDOSCOPICALLY HARVESTED RIGHT GREATER SAPHENOUS VEIN AND PRP.;  Surgeon: Bill Devi MD;  Location: [facility] MAIN OR;  Service: Cardiothoracic   • DENTAL " "PROCEDURE      3 surgeries inder implants   • HERNIA REPAIR      inguinal bilaterally   • KIDNEY STONE SURGERY      lithotripsy         Family and social history reviewed.     ROS  All other systems were reviewed and are negative          Objective:     Vitals:    01/22/20 1123   BP: 160/68   BP Location: Left arm   Patient Position: Sitting   Pulse: 71   Weight: 90.7 kg (200 lb)   Height: 175.3 cm (69\")     Body mass index is 29.53 kg/m².    PHYSICAL EXAM:  Physical Exam   Constitutional: He is oriented to person, place, and time. He appears well-developed and well-nourished. No distress.   HENT:   Head: Normocephalic.   Eyes: Conjunctivae are normal.   Neck: Normal range of motion. No JVD present.   Cardiovascular: Normal rate, regular rhythm, normal heart sounds and intact distal pulses.   No murmur heard.  Pulses:       Carotid pulses are 2+ on the right side, and 2+ on the left side.       Radial pulses are 2+ on the right side, and 2+ on the left side.        Posterior tibial pulses are 2+ on the right side, and 2+ on the left side.   Pulmonary/Chest: Effort normal and breath sounds normal. No respiratory distress. He has no wheezes. He has no rhonchi. He has no rales. He exhibits no tenderness.   Abdominal: Soft. Bowel sounds are normal. He exhibits no distension.   Musculoskeletal: Normal range of motion. He exhibits edema (1+ ankle bilateral ).   Flaccid RUE    Ambulates with walker   Neurological: He is alert and oriented to person, place, and time.   Skin: Skin is warm, dry and intact. No rash noted. He is not diaphoretic. No cyanosis.   Psychiatric: He has a normal mood and affect. His behavior is normal. Judgment and thought content normal.         ECG 12 Lead  Date/Time: 1/22/2020 4:32 PM  Performed by: Danii Dennison APRN  Authorized by: Danii Dennison APRN   Comparison: compared with previous ECG   Similar to previous ECG  Rhythm: sinus rhythm  Rate: normal  Conduction: right bundle branch block and " left posterior fascicular block  T inversion: II, III, aVF, V4, V5 and V6  T flattening: I    Clinical impression: abnormal EKG            Current Outpatient Medications   Medication Sig Dispense Refill   • aspirin 81 MG EC tablet Take 81 mg by mouth Daily.     • B Complex Vitamins (VITAMIN B COMPLEX) capsule capsule Take  by mouth Daily.     • CBD oil (cannabidiol) capsule Take  by mouth Every Evening.     • Cholecalciferol (VITAMIN D PO) Take 5,000 Units by mouth Daily.     • furosemide (LASIX) 40 MG tablet Take 1 tablet by mouth 2 (Two) Times a Day. 60 tablet 11   • gabapentin (NEURONTIN) 300 MG capsule Take 1 capsule by mouth Every Night. (Patient taking differently: Take 300 mg by mouth At Night As Needed.) 30 capsule 5   • hydrALAZINE (APRESOLINE) 25 MG tablet Take 25 mg by mouth 2 (Two) Times a Day.     • insulin degludec (TRESIBA FLEXTOUCH) 100 UNIT/ML solution pen-injector injection Inject 25 Units under the skin into the appropriate area as directed Daily With Dinner.     • insulin lispro (HUMALOG) 100 UNIT/ML injection Inject  under the skin into the appropriate area as directed 3 (Three) Times a Day Before Meals. PER SS -  Currently taking 4 units at noon and 7 units in pm plus 2 units for -250, 4 units for -300, 6 units for -350     • melatonin 3 MG tablet Take 3 mg by mouth Every Night.     • metoprolol tartrate (LOPRESSOR) 25 MG tablet Take 0.5 tablets by mouth Every 12 (Twelve) Hours. 30 tablet 11   • pramipexole (MIRAPEX) 0.25 MG tablet Take 0.25 mg by mouth 2 (Two) Times a Day As Needed (restless leg).     • tamsulosin (FLOMAX) 0.4 MG capsule 24 hr capsule Take 1 capsule by mouth 2 (Two) Times a Day. Wife states he takes it when he has trouble voiding, Also taking for high blood pressure.     • testosterone (ANDROGEL) 50 MG/5GM (1%) gel gel Daily.     • traZODone (DESYREL) 100 MG tablet Take 1 tablet by mouth At Night As Needed for Sleep. 30 tablet 2     No current  facility-administered medications for this visit.      Assessment:       Diagnosis Plan   1. Coronary artery disease involving native coronary artery of native heart without angina pectoris     2. Essential hypertension     3. Mixed hyperlipidemia     4. Chronic diastolic CHF (congestive heart failure) (CMS/[facility])     5. Hx of CABG          No orders of the defined types were placed in this encounter.        Plan:     1.  69-year-old gentleman with coronary artery disease status post coronary artery bypass grafting times 3 July 2019 with LIMA to LAD, vein graft to obtuse marginal 1, vein graft to distal right coronary artery with normal left ventricular systolic function.  Then non-STEMI and  inferolateral wall ischemia on perfusion stress test.  Cardiac catheterization 11/2019 showed patent grafts normal left ventricular systolic function.  -No angina continue the same    2.  Hypertension blood pressure is labile at home they are following this closely  and no medication adjustments were made today  3.  Hyperlipidemia- unclear why he is not on statin therapy  4.  Diabetes mellitus followed by PCP  5.  Obstructive sleep apnea still having difficulty tolerating BiPAP.  Follows with Dr. Cai   6.  Pulmonary hypertension  7.  Postoperative atrial fibrillation- no known recurrence  8.  Bilateral carotid artery stenosis mild on duplex July 2019  9.  Grade 3 diastolic dysfunction on echocardiogram July 2019  10. Remote history of thalamic hemorrhage noted on MRI July 2019  11. History of hypogonadism with prior testosterone replacement treatment.    12. Chronic diastolic congestive heart failure normal ejection fraction on echo November 2019 weights have been reasonably stable on 40 mg Lasix twice daily.  However, he has had a 10 pound weight gain in the last 2 months and that is with taking an extra Lasix once a week for weight gain above 2 pounds.  He will take 40 mg 3 times a day for the next 3 days then resume 40 mg  twice daily dosing.  They are to call with concerns or questions.  13. Chronic kidney disease follows with nephrology who stopped his potassium.  Reviewed his blood work from 12 days ago.  Creatinine was 1.23, BUN 21, GFR 58.  Potassium was within normal range  14. Balance issues agree with physical therapy he is to follow-up with his PCP  15. Erectile Dysfunction- Cardiovascular clearance to start viagra at lowest dose.     Follow-up in 6 months call with questions or concerns.      It has been a pleasure to participate in this patient's care.      Thank you,  LEIDY Mejía      **I used Dragon to dictate this note:**

## 2020-01-24 ENCOUNTER — TELEPHONE (OUTPATIENT)
Dept: FAMILY MEDICINE CLINIC | Facility: CLINIC | Age: 70
End: 2020-01-24

## 2020-01-27 RX ORDER — VARDENAFIL HCL 10 MG
TABLET,DISINTEGRATING ORAL
Qty: 9 TABLET | Refills: 4 | Status: SHIPPED | OUTPATIENT
Start: 2020-01-27 | End: 2020-03-06

## 2020-01-27 RX ORDER — SILDENAFIL CITRATE 20 MG/1
TABLET ORAL
Qty: 20 TABLET | Refills: 0 | Status: SHIPPED | OUTPATIENT
Start: 2020-01-27 | End: 2020-03-30

## 2020-02-06 ENCOUNTER — OFFICE VISIT (OUTPATIENT)
Dept: FAMILY MEDICINE CLINIC | Facility: CLINIC | Age: 70
End: 2020-02-06

## 2020-02-06 VITALS
HEART RATE: 76 BPM | SYSTOLIC BLOOD PRESSURE: 124 MMHG | TEMPERATURE: 98.2 F | DIASTOLIC BLOOD PRESSURE: 60 MMHG | OXYGEN SATURATION: 90 % | WEIGHT: 199 LBS | BODY MASS INDEX: 29.47 KG/M2 | HEIGHT: 69 IN

## 2020-02-06 DIAGNOSIS — I10 HYPERTENSION, ESSENTIAL: ICD-10-CM

## 2020-02-06 DIAGNOSIS — J01.00 ACUTE MAXILLARY SINUSITIS, RECURRENCE NOT SPECIFIED: ICD-10-CM

## 2020-02-06 DIAGNOSIS — J04.0 LARYNGITIS: ICD-10-CM

## 2020-02-06 DIAGNOSIS — J40 BRONCHITIS: Primary | ICD-10-CM

## 2020-02-06 PROCEDURE — 71046 X-RAY EXAM CHEST 2 VIEWS: CPT | Performed by: INTERNAL MEDICINE

## 2020-02-06 PROCEDURE — 99214 OFFICE O/P EST MOD 30 MIN: CPT | Performed by: INTERNAL MEDICINE

## 2020-02-06 RX ORDER — LEVOFLOXACIN 500 MG/1
500 TABLET, FILM COATED ORAL DAILY
Qty: 10 TABLET | Refills: 0 | Status: ON HOLD | OUTPATIENT
Start: 2020-02-06 | End: 2020-02-26

## 2020-02-06 NOTE — PROGRESS NOTES
Subjective   Dilip Verma is a 69 y.o. male.  Cough congestion 1 week's duration no wheezing but some sputum also blood pressure drain continue to be labile  Body mass index is 29.39 kg/m².  History of Present Illness   Cough congestion some sputum mild sinus drainage as well no reported temperature over the last week or so.  Hoarse weak voice  History of labile blood pressure followed by nephrology for this as well.  They do bring in blood pressure readings anywhere from the 120s systolic up to the 170s systolic.  Highly variable blood pressure readings with a day without obvious pattern on the chart that they bring them to me.  Sinus drainage is cloudy as well.  Has laryngitis also.  Does include Ativan causing debility ACE inhibitor is angioedema adhesive tape, losartan angioedema minoxidil unknown confusion I believe by history penicillin unknown sulfa undefined tetracycline undefined.  Former smoker quit in 2000  Review of Systems   Constitutional: Positive for fatigue.   HENT: Positive for congestion, sneezing and sore throat.    Respiratory: Positive for cough.    All other systems reviewed and are negative.      Objective   Vitals:    02/06/20 1309   BP: 124/60   Pulse: 76   Temp: 98.2 °F (36.8 °C)   SpO2: 90%   Weight: 90.3 kg (199 lb)     Physical Exam   Constitutional: He appears well-developed and well-nourished.   HENT:   Head: Normocephalic.   Right Ear: External ear normal.   Left Ear: External ear normal.   Mild inflammation posterior pharynx without exudate   Eyes: Pupils are equal, round, and reactive to light. Conjunctivae are normal.   Cardiovascular: Normal rate, regular rhythm and normal heart sounds.   Pulmonary/Chest: Effort normal.   Right lung clear no wheezes left lung few faint rales left base.   Abdominal: Soft. Bowel sounds are normal.   Neurological: He is alert.   Relatively stable gait and station somewhat slow history of CVA   Skin: Skin is warm and dry.       Lab Results    Component Value Date    INR 1.48 (H) 07/19/2019    INR 1.63 (H) 07/18/2019    INR 1.75 (H) 07/18/2019       Procedures  Chest x-ray  PA and lateral obtained.  No comparisons available.  No bony or soft tissue normalities are noted.  Mild increased markings present does have evidence of prior sternotomy known by history.  Assessment/Plan   1.  Bronchitis Levaquin 500 mg daily for 10 days time follow-up or call if not well in 10 days time no increased temperature reported.  Follow-up or call if not well in 10 days time  2.  Hypertension somewhat labile tentatively increase hydralazine up to 25 g 3 times daily there is some associated drowsiness with his blood pressure meds look to chart see if he has had prior nifedipine versus other intolerance to drop.  Porter other medicines will have to look both amlodipine and nifedipine would perhaps be more potent as for his blocking the high blood pressure readings we have see if we have alternative measures.    3.  Maxillary sinusitis plan Levaquin should suffice    4.  Laryngitis relative rest and hydration    Call blood pressure readings in 1 week's time as needed.     Much of this encounter note is an electronic transcription/translation of spoken language to printed text.  The electronic translation of spoken language may permit erroneous, or at times, nonsensical words or phrases to be inadvertently transcribed.  Although I have reviewed the note for such errors, some may still exist. If there are questions or for further clarification, please contact me.

## 2020-02-25 ENCOUNTER — TELEPHONE (OUTPATIENT)
Dept: FAMILY MEDICINE CLINIC | Facility: CLINIC | Age: 70
End: 2020-02-25

## 2020-02-25 ENCOUNTER — APPOINTMENT (OUTPATIENT)
Dept: GENERAL RADIOLOGY | Facility: HOSPITAL | Age: 70
End: 2020-02-25

## 2020-02-25 ENCOUNTER — HOSPITAL ENCOUNTER (INPATIENT)
Facility: HOSPITAL | Age: 70
LOS: 6 days | Discharge: HOME | End: 2020-03-02
Attending: EMERGENCY MEDICINE | Admitting: INTERNAL MEDICINE

## 2020-02-25 DIAGNOSIS — R07.9 CHEST PAIN, UNSPECIFIED TYPE: ICD-10-CM

## 2020-02-25 DIAGNOSIS — I16.0 HYPERTENSIVE URGENCY: Primary | ICD-10-CM

## 2020-02-25 LAB
ALBUMIN SERPL-MCNC: 4.2 G/DL (ref 3.5–5.2)
ALBUMIN/GLOB SERPL: 1.3 G/DL
ALP SERPL-CCNC: 72 U/L (ref 39–117)
ALT SERPL W P-5'-P-CCNC: 20 U/L (ref 1–41)
ANION GAP SERPL CALCULATED.3IONS-SCNC: 10 MMOL/L (ref 5–15)
APTT PPP: 34.9 SECONDS (ref 22.7–35.4)
AST SERPL-CCNC: 21 U/L (ref 1–40)
B PARAPERT DNA SPEC QL NAA+PROBE: NOT DETECTED
B PERT DNA SPEC QL NAA+PROBE: NOT DETECTED
BASOPHILS # BLD AUTO: 0.03 10*3/MM3 (ref 0–0.2)
BASOPHILS NFR BLD AUTO: 0.4 % (ref 0–1.5)
BILIRUB SERPL-MCNC: 0.9 MG/DL (ref 0.2–1.2)
BUN BLD-MCNC: 19 MG/DL (ref 8–23)
BUN/CREAT SERPL: 16.4 (ref 7–25)
C PNEUM DNA NPH QL NAA+NON-PROBE: NOT DETECTED
CALCIUM SPEC-SCNC: 9.4 MG/DL (ref 8.6–10.5)
CHLORIDE SERPL-SCNC: 103 MMOL/L (ref 98–107)
CO2 SERPL-SCNC: 30 MMOL/L (ref 22–29)
CREAT BLD-MCNC: 1.16 MG/DL (ref 0.76–1.27)
DEPRECATED RDW RBC AUTO: 45 FL (ref 37–54)
EOSINOPHIL # BLD AUTO: 0.24 10*3/MM3 (ref 0–0.4)
EOSINOPHIL NFR BLD AUTO: 3.2 % (ref 0.3–6.2)
ERYTHROCYTE [DISTWIDTH] IN BLOOD BY AUTOMATED COUNT: 13.5 % (ref 12.3–15.4)
FLUAV H1 2009 PAND RNA NPH QL NAA+PROBE: NOT DETECTED
FLUAV H1 HA GENE NPH QL NAA+PROBE: NOT DETECTED
FLUAV H3 RNA NPH QL NAA+PROBE: NOT DETECTED
FLUAV SUBTYP SPEC NAA+PROBE: NOT DETECTED
FLUBV RNA ISLT QL NAA+PROBE: NOT DETECTED
GFR SERPL CREATININE-BSD FRML MDRD: 62 ML/MIN/1.73
GLOBULIN UR ELPH-MCNC: 3.2 GM/DL
GLUCOSE BLD-MCNC: 239 MG/DL (ref 65–99)
HADV DNA SPEC NAA+PROBE: NOT DETECTED
HCOV 229E RNA SPEC QL NAA+PROBE: NOT DETECTED
HCOV HKU1 RNA SPEC QL NAA+PROBE: NOT DETECTED
HCOV NL63 RNA SPEC QL NAA+PROBE: NOT DETECTED
HCOV OC43 RNA SPEC QL NAA+PROBE: NOT DETECTED
HCT VFR BLD AUTO: 43.7 % (ref 37.5–51)
HGB BLD-MCNC: 14.2 G/DL (ref 13–17.7)
HMPV RNA NPH QL NAA+NON-PROBE: NOT DETECTED
HPIV1 RNA SPEC QL NAA+PROBE: NOT DETECTED
HPIV2 RNA SPEC QL NAA+PROBE: NOT DETECTED
HPIV3 RNA NPH QL NAA+PROBE: NOT DETECTED
HPIV4 P GENE NPH QL NAA+PROBE: NOT DETECTED
IMM GRANULOCYTES # BLD AUTO: 0.02 10*3/MM3 (ref 0–0.05)
IMM GRANULOCYTES NFR BLD AUTO: 0.3 % (ref 0–0.5)
INR PPP: 1.13 (ref 0.9–1.1)
LYMPHOCYTES # BLD AUTO: 1.31 10*3/MM3 (ref 0.7–3.1)
LYMPHOCYTES NFR BLD AUTO: 17.3 % (ref 19.6–45.3)
M PNEUMO IGG SER IA-ACNC: NOT DETECTED
MCH RBC QN AUTO: 29.6 PG (ref 26.6–33)
MCHC RBC AUTO-ENTMCNC: 32.5 G/DL (ref 31.5–35.7)
MCV RBC AUTO: 91.2 FL (ref 79–97)
MONOCYTES # BLD AUTO: 0.57 10*3/MM3 (ref 0.1–0.9)
MONOCYTES NFR BLD AUTO: 7.5 % (ref 5–12)
NEUTROPHILS # BLD AUTO: 5.41 10*3/MM3 (ref 1.7–7)
NEUTROPHILS NFR BLD AUTO: 71.3 % (ref 42.7–76)
NRBC BLD AUTO-RTO: 0 /100 WBC (ref 0–0.2)
NT-PROBNP SERPL-MCNC: 2824 PG/ML (ref 5–900)
PLATELET # BLD AUTO: 226 10*3/MM3 (ref 140–450)
PMV BLD AUTO: 9.8 FL (ref 6–12)
POTASSIUM BLD-SCNC: 4.6 MMOL/L (ref 3.5–5.2)
PROCALCITONIN SERPL-MCNC: 0.05 NG/ML (ref 0.1–0.25)
PROT SERPL-MCNC: 7.4 G/DL (ref 6–8.5)
PROTHROMBIN TIME: 14.3 SECONDS (ref 11.7–14.2)
RBC # BLD AUTO: 4.79 10*6/MM3 (ref 4.14–5.8)
RHINOVIRUS RNA SPEC NAA+PROBE: NOT DETECTED
RSV RNA NPH QL NAA+NON-PROBE: NOT DETECTED
SODIUM BLD-SCNC: 143 MMOL/L (ref 136–145)
TROPONIN T SERPL-MCNC: 0.04 NG/ML (ref 0–0.03)
TROPONIN T SERPL-MCNC: 0.05 NG/ML (ref 0–0.03)
WBC NRBC COR # BLD: 7.58 10*3/MM3 (ref 3.4–10.8)

## 2020-02-25 PROCEDURE — 85610 PROTHROMBIN TIME: CPT | Performed by: EMERGENCY MEDICINE

## 2020-02-25 PROCEDURE — 0100U HC BIOFIRE FILMARRAY RESP PANEL 2: CPT | Performed by: EMERGENCY MEDICINE

## 2020-02-25 PROCEDURE — 99285 EMERGENCY DEPT VISIT HI MDM: CPT

## 2020-02-25 PROCEDURE — 93005 ELECTROCARDIOGRAM TRACING: CPT | Performed by: EMERGENCY MEDICINE

## 2020-02-25 PROCEDURE — 85025 COMPLETE CBC W/AUTO DIFF WBC: CPT | Performed by: EMERGENCY MEDICINE

## 2020-02-25 PROCEDURE — 93005 ELECTROCARDIOGRAM TRACING: CPT

## 2020-02-25 PROCEDURE — 80053 COMPREHEN METABOLIC PANEL: CPT | Performed by: EMERGENCY MEDICINE

## 2020-02-25 PROCEDURE — 84484 ASSAY OF TROPONIN QUANT: CPT | Performed by: EMERGENCY MEDICINE

## 2020-02-25 PROCEDURE — 85730 THROMBOPLASTIN TIME PARTIAL: CPT | Performed by: EMERGENCY MEDICINE

## 2020-02-25 PROCEDURE — 71046 X-RAY EXAM CHEST 2 VIEWS: CPT

## 2020-02-25 PROCEDURE — 83880 ASSAY OF NATRIURETIC PEPTIDE: CPT | Performed by: EMERGENCY MEDICINE

## 2020-02-25 PROCEDURE — 84145 PROCALCITONIN (PCT): CPT | Performed by: EMERGENCY MEDICINE

## 2020-02-25 PROCEDURE — 93010 ELECTROCARDIOGRAM REPORT: CPT | Performed by: INTERNAL MEDICINE

## 2020-02-25 RX ORDER — LABETALOL HYDROCHLORIDE 5 MG/ML
20 INJECTION, SOLUTION INTRAVENOUS ONCE
Status: COMPLETED | OUTPATIENT
Start: 2020-02-25 | End: 2020-02-25

## 2020-02-25 RX ADMIN — LABETALOL 20 MG/4 ML (5 MG/ML) INTRAVENOUS SYRINGE 20 MG: at 20:16

## 2020-02-25 RX ADMIN — LABETALOL 20 MG/4 ML (5 MG/ML) INTRAVENOUS SYRINGE 20 MG: at 17:07

## 2020-02-25 NOTE — TELEPHONE ENCOUNTER
Beny called for the PT saying that his BP has been 179/93 at about 2:30 today. She said that he has be taking Dayquil to help with some cold symptoms and she wants to know if they should be concerned? Please call and advice.

## 2020-02-25 NOTE — TELEPHONE ENCOUNTER
PATIENT WIFE JANAE CALLED TO INFORM THAT PATIENT WAS TAKEN TO THE [facility] ER DUE TO HIGH BLOOD PRESSURE. TRANSPORTED IN AMBULANCE.    PATIENT CALLBACK IF NEEDED; [phone] - HOME NUMBER OK TO LEAVE MESSAGE

## 2020-02-25 NOTE — TELEPHONE ENCOUNTER
Please refrain from DayQuil and all decongestants as that will drive up blood pressure let us know if blood pressure is still high and patient avoids these cold medicines.  Antihistamines are okay decongestants not okay

## 2020-02-26 PROBLEM — I95.1 ORTHOSTASIS: Status: RESOLVED | Noted: 2019-07-20 | Resolved: 2020-02-26

## 2020-02-26 PROBLEM — I21.4 NSTEMI (NON-ST ELEVATED MYOCARDIAL INFARCTION): Status: RESOLVED | Noted: 2019-07-12 | Resolved: 2020-02-26

## 2020-02-26 PROBLEM — R09.02 HYPOXIA: Status: ACTIVE | Noted: 2020-02-26

## 2020-02-26 PROBLEM — R09.89 LABILE HYPERTENSION: Status: ACTIVE | Noted: 2020-02-26

## 2020-02-26 LAB
ANION GAP SERPL CALCULATED.3IONS-SCNC: 13.8 MMOL/L (ref 5–15)
BUN BLD-MCNC: 16 MG/DL (ref 8–23)
BUN/CREAT SERPL: 15.4 (ref 7–25)
CALCIUM SPEC-SCNC: 9.2 MG/DL (ref 8.6–10.5)
CHLORIDE SERPL-SCNC: 101 MMOL/L (ref 98–107)
CO2 SERPL-SCNC: 27.2 MMOL/L (ref 22–29)
CREAT BLD-MCNC: 1.04 MG/DL (ref 0.76–1.27)
GFR SERPL CREATININE-BSD FRML MDRD: 71 ML/MIN/1.73
GLUCOSE BLD-MCNC: 251 MG/DL (ref 65–99)
GLUCOSE BLDC GLUCOMTR-MCNC: 186 MG/DL (ref 70–130)
GLUCOSE BLDC GLUCOMTR-MCNC: 239 MG/DL (ref 70–130)
GLUCOSE BLDC GLUCOMTR-MCNC: 320 MG/DL (ref 70–130)
POTASSIUM BLD-SCNC: 3.8 MMOL/L (ref 3.5–5.2)
SODIUM BLD-SCNC: 142 MMOL/L (ref 136–145)

## 2020-02-26 PROCEDURE — 99223 1ST HOSP IP/OBS HIGH 75: CPT | Performed by: INTERNAL MEDICINE

## 2020-02-26 PROCEDURE — 63710000001 INSULIN GLARGINE PER 5 UNITS: Performed by: INTERNAL MEDICINE

## 2020-02-26 PROCEDURE — 25010000002 ENOXAPARIN PER 10 MG: Performed by: INTERNAL MEDICINE

## 2020-02-26 PROCEDURE — 82962 GLUCOSE BLOOD TEST: CPT

## 2020-02-26 PROCEDURE — 25010000002 FUROSEMIDE PER 20 MG: Performed by: INTERNAL MEDICINE

## 2020-02-26 PROCEDURE — 63710000001 INSULIN LISPRO (HUMAN) PER 5 UNITS: Performed by: INTERNAL MEDICINE

## 2020-02-26 PROCEDURE — 80048 BASIC METABOLIC PNL TOTAL CA: CPT | Performed by: INTERNAL MEDICINE

## 2020-02-26 RX ORDER — DEXTROSE MONOHYDRATE 25 G/50ML
25 INJECTION, SOLUTION INTRAVENOUS
Status: DISCONTINUED | OUTPATIENT
Start: 2020-02-26 | End: 2020-03-02 | Stop reason: HOSPADM

## 2020-02-26 RX ORDER — SPIRONOLACTONE 25 MG/1
25 TABLET ORAL DAILY
Status: DISCONTINUED | OUTPATIENT
Start: 2020-02-26 | End: 2020-03-02 | Stop reason: HOSPADM

## 2020-02-26 RX ORDER — GABAPENTIN 300 MG/1
300 CAPSULE ORAL NIGHTLY
Status: DISCONTINUED | OUTPATIENT
Start: 2020-02-26 | End: 2020-03-02 | Stop reason: HOSPADM

## 2020-02-26 RX ORDER — PRAMIPEXOLE DIHYDROCHLORIDE 0.25 MG/1
0.25 TABLET ORAL 2 TIMES DAILY PRN
Status: DISCONTINUED | OUTPATIENT
Start: 2020-02-26 | End: 2020-03-02 | Stop reason: HOSPADM

## 2020-02-26 RX ORDER — UREA 10 %
3 LOTION (ML) TOPICAL NIGHTLY
Status: DISCONTINUED | OUTPATIENT
Start: 2020-02-26 | End: 2020-03-02 | Stop reason: HOSPADM

## 2020-02-26 RX ORDER — FUROSEMIDE 10 MG/ML
40 INJECTION INTRAMUSCULAR; INTRAVENOUS 2 TIMES DAILY
Status: DISCONTINUED | OUTPATIENT
Start: 2020-02-26 | End: 2020-03-02 | Stop reason: HOSPADM

## 2020-02-26 RX ORDER — ASPIRIN 81 MG/1
81 TABLET ORAL DAILY
Status: DISCONTINUED | OUTPATIENT
Start: 2020-02-27 | End: 2020-03-02 | Stop reason: HOSPADM

## 2020-02-26 RX ORDER — NICOTINE POLACRILEX 4 MG
15 LOZENGE BUCCAL
Status: DISCONTINUED | OUTPATIENT
Start: 2020-02-26 | End: 2020-03-02 | Stop reason: HOSPADM

## 2020-02-26 RX ORDER — INSULIN GLARGINE 100 [IU]/ML
25 INJECTION, SOLUTION SUBCUTANEOUS NIGHTLY
Status: DISCONTINUED | OUTPATIENT
Start: 2020-02-26 | End: 2020-03-02 | Stop reason: HOSPADM

## 2020-02-26 RX ORDER — TAMSULOSIN HYDROCHLORIDE 0.4 MG/1
0.4 CAPSULE ORAL 2 TIMES DAILY
Status: DISCONTINUED | OUTPATIENT
Start: 2020-02-26 | End: 2020-03-02 | Stop reason: HOSPADM

## 2020-02-26 RX ORDER — HYDRALAZINE HYDROCHLORIDE 25 MG/1
25 TABLET, FILM COATED ORAL 3 TIMES DAILY
Status: DISCONTINUED | OUTPATIENT
Start: 2020-02-26 | End: 2020-03-01

## 2020-02-26 RX ORDER — TRAZODONE HYDROCHLORIDE 100 MG/1
100 TABLET ORAL NIGHTLY PRN
Status: DISCONTINUED | OUTPATIENT
Start: 2020-02-26 | End: 2020-03-02 | Stop reason: HOSPADM

## 2020-02-26 RX ADMIN — INSULIN GLARGINE 25 UNITS: 100 INJECTION, SOLUTION SUBCUTANEOUS at 21:43

## 2020-02-26 RX ADMIN — FUROSEMIDE 40 MG: 10 INJECTION, SOLUTION INTRAMUSCULAR; INTRAVENOUS at 13:47

## 2020-02-26 RX ADMIN — Medication 3 MG: at 21:43

## 2020-02-26 RX ADMIN — HYDRALAZINE HYDROCHLORIDE 25 MG: 25 TABLET, FILM COATED ORAL at 21:43

## 2020-02-26 RX ADMIN — INSULIN LISPRO 4 UNITS: 100 INJECTION, SOLUTION INTRAVENOUS; SUBCUTANEOUS at 21:43

## 2020-02-26 RX ADMIN — HYDRALAZINE HYDROCHLORIDE 25 MG: 25 TABLET, FILM COATED ORAL at 16:28

## 2020-02-26 RX ADMIN — FUROSEMIDE 40 MG: 10 INJECTION, SOLUTION INTRAMUSCULAR; INTRAVENOUS at 21:43

## 2020-02-26 RX ADMIN — PRAMIPEXOLE DIHYDROCHLORIDE 0.25 MG: 0.25 TABLET ORAL at 21:43

## 2020-02-26 RX ADMIN — TAMSULOSIN HYDROCHLORIDE 0.4 MG: 0.4 CAPSULE ORAL at 21:43

## 2020-02-26 RX ADMIN — GABAPENTIN 300 MG: 300 CAPSULE ORAL at 21:43

## 2020-02-26 RX ADMIN — SPIRONOLACTONE 25 MG: 25 TABLET, FILM COATED ORAL at 16:28

## 2020-02-26 RX ADMIN — INSULIN LISPRO 7 UNITS: 100 INJECTION, SOLUTION INTRAVENOUS; SUBCUTANEOUS at 17:50

## 2020-02-26 RX ADMIN — ENOXAPARIN SODIUM 40 MG: 40 INJECTION SUBCUTANEOUS at 14:30

## 2020-02-26 RX ADMIN — METOPROLOL TARTRATE 12.5 MG: 25 TABLET ORAL at 21:43

## 2020-02-27 LAB
ANION GAP SERPL CALCULATED.3IONS-SCNC: 14.9 MMOL/L (ref 5–15)
BUN BLD-MCNC: 17 MG/DL (ref 8–23)
BUN/CREAT SERPL: 14 (ref 7–25)
CALCIUM SPEC-SCNC: 9.1 MG/DL (ref 8.6–10.5)
CHLORIDE SERPL-SCNC: 103 MMOL/L (ref 98–107)
CO2 SERPL-SCNC: 29.1 MMOL/L (ref 22–29)
CREAT BLD-MCNC: 1.21 MG/DL (ref 0.76–1.27)
GFR SERPL CREATININE-BSD FRML MDRD: 59 ML/MIN/1.73
GLUCOSE BLD-MCNC: 99 MG/DL (ref 65–99)
GLUCOSE BLDC GLUCOMTR-MCNC: 137 MG/DL (ref 70–130)
GLUCOSE BLDC GLUCOMTR-MCNC: 153 MG/DL (ref 70–130)
GLUCOSE BLDC GLUCOMTR-MCNC: 203 MG/DL (ref 70–130)
GLUCOSE BLDC GLUCOMTR-MCNC: 99 MG/DL (ref 70–130)
POTASSIUM BLD-SCNC: 3.8 MMOL/L (ref 3.5–5.2)
SODIUM BLD-SCNC: 147 MMOL/L (ref 136–145)

## 2020-02-27 PROCEDURE — 25010000002 FUROSEMIDE PER 20 MG: Performed by: INTERNAL MEDICINE

## 2020-02-27 PROCEDURE — 25010000002 ENOXAPARIN PER 10 MG: Performed by: INTERNAL MEDICINE

## 2020-02-27 PROCEDURE — 63710000001 INSULIN LISPRO (HUMAN) PER 5 UNITS: Performed by: INTERNAL MEDICINE

## 2020-02-27 PROCEDURE — 99232 SBSQ HOSP IP/OBS MODERATE 35: CPT | Performed by: NURSE PRACTITIONER

## 2020-02-27 PROCEDURE — 63710000001 INSULIN GLARGINE PER 5 UNITS: Performed by: INTERNAL MEDICINE

## 2020-02-27 PROCEDURE — 82962 GLUCOSE BLOOD TEST: CPT

## 2020-02-27 PROCEDURE — 94640 AIRWAY INHALATION TREATMENT: CPT

## 2020-02-27 PROCEDURE — 80048 BASIC METABOLIC PNL TOTAL CA: CPT | Performed by: INTERNAL MEDICINE

## 2020-02-27 RX ORDER — IPRATROPIUM BROMIDE AND ALBUTEROL SULFATE 2.5; .5 MG/3ML; MG/3ML
3 SOLUTION RESPIRATORY (INHALATION)
Status: DISCONTINUED | OUTPATIENT
Start: 2020-02-27 | End: 2020-03-02 | Stop reason: HOSPADM

## 2020-02-27 RX ADMIN — GABAPENTIN 300 MG: 300 CAPSULE ORAL at 21:17

## 2020-02-27 RX ADMIN — METOPROLOL TARTRATE 12.5 MG: 25 TABLET ORAL at 08:45

## 2020-02-27 RX ADMIN — HYDRALAZINE HYDROCHLORIDE 25 MG: 25 TABLET, FILM COATED ORAL at 16:38

## 2020-02-27 RX ADMIN — ASPIRIN 81 MG: 81 TABLET, COATED ORAL at 08:45

## 2020-02-27 RX ADMIN — TAMSULOSIN HYDROCHLORIDE 0.4 MG: 0.4 CAPSULE ORAL at 21:17

## 2020-02-27 RX ADMIN — METOPROLOL TARTRATE 12.5 MG: 25 TABLET ORAL at 21:17

## 2020-02-27 RX ADMIN — INSULIN GLARGINE 25 UNITS: 100 INJECTION, SOLUTION SUBCUTANEOUS at 21:16

## 2020-02-27 RX ADMIN — ENOXAPARIN SODIUM 40 MG: 40 INJECTION SUBCUTANEOUS at 14:19

## 2020-02-27 RX ADMIN — INSULIN LISPRO 2 UNITS: 100 INJECTION, SOLUTION INTRAVENOUS; SUBCUTANEOUS at 21:17

## 2020-02-27 RX ADMIN — TAMSULOSIN HYDROCHLORIDE 0.4 MG: 0.4 CAPSULE ORAL at 08:45

## 2020-02-27 RX ADMIN — HYDRALAZINE HYDROCHLORIDE 25 MG: 25 TABLET, FILM COATED ORAL at 21:17

## 2020-02-27 RX ADMIN — SPIRONOLACTONE 25 MG: 25 TABLET, FILM COATED ORAL at 08:45

## 2020-02-27 RX ADMIN — INSULIN LISPRO 4 UNITS: 100 INJECTION, SOLUTION INTRAVENOUS; SUBCUTANEOUS at 12:02

## 2020-02-27 RX ADMIN — INSULIN LISPRO 4 UNITS: 100 INJECTION, SOLUTION INTRAVENOUS; SUBCUTANEOUS at 12:01

## 2020-02-27 RX ADMIN — Medication 3 MG: at 21:17

## 2020-02-27 RX ADMIN — FUROSEMIDE 40 MG: 10 INJECTION, SOLUTION INTRAMUSCULAR; INTRAVENOUS at 21:17

## 2020-02-27 RX ADMIN — FUROSEMIDE 40 MG: 10 INJECTION, SOLUTION INTRAMUSCULAR; INTRAVENOUS at 08:45

## 2020-02-27 RX ADMIN — INSULIN LISPRO 7 UNITS: 100 INJECTION, SOLUTION INTRAVENOUS; SUBCUTANEOUS at 18:11

## 2020-02-27 RX ADMIN — HYDRALAZINE HYDROCHLORIDE 25 MG: 25 TABLET, FILM COATED ORAL at 08:45

## 2020-02-27 RX ADMIN — IPRATROPIUM BROMIDE AND ALBUTEROL SULFATE 3 ML: 2.5; .5 SOLUTION RESPIRATORY (INHALATION) at 16:28

## 2020-02-28 LAB
ANION GAP SERPL CALCULATED.3IONS-SCNC: 13.2 MMOL/L (ref 5–15)
BUN BLD-MCNC: 21 MG/DL (ref 8–23)
BUN/CREAT SERPL: 17.8 (ref 7–25)
CALCIUM SPEC-SCNC: 9.3 MG/DL (ref 8.6–10.5)
CHLORIDE SERPL-SCNC: 100 MMOL/L (ref 98–107)
CO2 SERPL-SCNC: 28.8 MMOL/L (ref 22–29)
CREAT BLD-MCNC: 1.18 MG/DL (ref 0.76–1.27)
GFR SERPL CREATININE-BSD FRML MDRD: 61 ML/MIN/1.73
GLUCOSE BLD-MCNC: 81 MG/DL (ref 65–99)
GLUCOSE BLDC GLUCOMTR-MCNC: 187 MG/DL (ref 70–130)
GLUCOSE BLDC GLUCOMTR-MCNC: 187 MG/DL (ref 70–130)
GLUCOSE BLDC GLUCOMTR-MCNC: 212 MG/DL (ref 70–130)
GLUCOSE BLDC GLUCOMTR-MCNC: 76 MG/DL (ref 70–130)
GLUCOSE BLDC GLUCOMTR-MCNC: 84 MG/DL (ref 70–130)
NT-PROBNP SERPL-MCNC: 1908 PG/ML (ref 5–900)
POTASSIUM BLD-SCNC: 3.7 MMOL/L (ref 3.5–5.2)
SODIUM BLD-SCNC: 142 MMOL/L (ref 136–145)

## 2020-02-28 PROCEDURE — 87205 SMEAR GRAM STAIN: CPT | Performed by: INTERNAL MEDICINE

## 2020-02-28 PROCEDURE — 87070 CULTURE OTHR SPECIMN AEROBIC: CPT | Performed by: INTERNAL MEDICINE

## 2020-02-28 PROCEDURE — 94799 UNLISTED PULMONARY SVC/PX: CPT

## 2020-02-28 PROCEDURE — 63710000001 INSULIN GLARGINE PER 5 UNITS: Performed by: INTERNAL MEDICINE

## 2020-02-28 PROCEDURE — 80048 BASIC METABOLIC PNL TOTAL CA: CPT | Performed by: INTERNAL MEDICINE

## 2020-02-28 PROCEDURE — 83880 ASSAY OF NATRIURETIC PEPTIDE: CPT | Performed by: NURSE PRACTITIONER

## 2020-02-28 PROCEDURE — 25010000002 ENOXAPARIN PER 10 MG: Performed by: INTERNAL MEDICINE

## 2020-02-28 PROCEDURE — 99232 SBSQ HOSP IP/OBS MODERATE 35: CPT | Performed by: NURSE PRACTITIONER

## 2020-02-28 PROCEDURE — 90791 PSYCH DIAGNOSTIC EVALUATION: CPT

## 2020-02-28 PROCEDURE — 82962 GLUCOSE BLOOD TEST: CPT

## 2020-02-28 PROCEDURE — 63710000001 INSULIN LISPRO (HUMAN) PER 5 UNITS: Performed by: INTERNAL MEDICINE

## 2020-02-28 PROCEDURE — 25010000002 FUROSEMIDE PER 20 MG: Performed by: INTERNAL MEDICINE

## 2020-02-28 RX ORDER — BISACODYL 5 MG/1
10 TABLET, DELAYED RELEASE ORAL DAILY PRN
Status: DISCONTINUED | OUTPATIENT
Start: 2020-02-28 | End: 2020-03-02 | Stop reason: HOSPADM

## 2020-02-28 RX ADMIN — FUROSEMIDE 40 MG: 10 INJECTION, SOLUTION INTRAMUSCULAR; INTRAVENOUS at 21:27

## 2020-02-28 RX ADMIN — INSULIN LISPRO 7 UNITS: 100 INJECTION, SOLUTION INTRAVENOUS; SUBCUTANEOUS at 17:47

## 2020-02-28 RX ADMIN — BISACODYL 10 MG: 5 TABLET ORAL at 19:54

## 2020-02-28 RX ADMIN — TAMSULOSIN HYDROCHLORIDE 0.4 MG: 0.4 CAPSULE ORAL at 08:49

## 2020-02-28 RX ADMIN — SPIRONOLACTONE 25 MG: 25 TABLET, FILM COATED ORAL at 08:49

## 2020-02-28 RX ADMIN — METOPROLOL TARTRATE 12.5 MG: 25 TABLET ORAL at 21:25

## 2020-02-28 RX ADMIN — HYDRALAZINE HYDROCHLORIDE 25 MG: 25 TABLET, FILM COATED ORAL at 21:25

## 2020-02-28 RX ADMIN — HYDRALAZINE HYDROCHLORIDE 25 MG: 25 TABLET, FILM COATED ORAL at 15:47

## 2020-02-28 RX ADMIN — HYDRALAZINE HYDROCHLORIDE 25 MG: 25 TABLET, FILM COATED ORAL at 08:49

## 2020-02-28 RX ADMIN — INSULIN LISPRO 4 UNITS: 100 INJECTION, SOLUTION INTRAVENOUS; SUBCUTANEOUS at 12:44

## 2020-02-28 RX ADMIN — INSULIN LISPRO 2 UNITS: 100 INJECTION, SOLUTION INTRAVENOUS; SUBCUTANEOUS at 21:26

## 2020-02-28 RX ADMIN — INSULIN LISPRO 2 UNITS: 100 INJECTION, SOLUTION INTRAVENOUS; SUBCUTANEOUS at 12:44

## 2020-02-28 RX ADMIN — INSULIN GLARGINE 25 UNITS: 100 INJECTION, SOLUTION SUBCUTANEOUS at 21:25

## 2020-02-28 RX ADMIN — Medication 3 MG: at 21:25

## 2020-02-28 RX ADMIN — GABAPENTIN 300 MG: 300 CAPSULE ORAL at 21:25

## 2020-02-28 RX ADMIN — IPRATROPIUM BROMIDE AND ALBUTEROL SULFATE 3 ML: 2.5; .5 SOLUTION RESPIRATORY (INHALATION) at 15:01

## 2020-02-28 RX ADMIN — ASPIRIN 81 MG: 81 TABLET, COATED ORAL at 08:49

## 2020-02-28 RX ADMIN — FUROSEMIDE 40 MG: 10 INJECTION, SOLUTION INTRAMUSCULAR; INTRAVENOUS at 08:49

## 2020-02-28 RX ADMIN — ENOXAPARIN SODIUM 40 MG: 40 INJECTION SUBCUTANEOUS at 15:46

## 2020-02-28 RX ADMIN — TAMSULOSIN HYDROCHLORIDE 0.4 MG: 0.4 CAPSULE ORAL at 21:25

## 2020-02-28 RX ADMIN — INSULIN LISPRO 4 UNITS: 100 INJECTION, SOLUTION INTRAVENOUS; SUBCUTANEOUS at 17:47

## 2020-02-28 RX ADMIN — METOPROLOL TARTRATE 12.5 MG: 25 TABLET ORAL at 08:50

## 2020-02-29 ENCOUNTER — APPOINTMENT (OUTPATIENT)
Dept: GENERAL RADIOLOGY | Facility: HOSPITAL | Age: 70
End: 2020-02-29

## 2020-02-29 LAB
ANION GAP SERPL CALCULATED.3IONS-SCNC: 14 MMOL/L (ref 5–15)
BUN BLD-MCNC: 25 MG/DL (ref 8–23)
BUN/CREAT SERPL: 19.8 (ref 7–25)
CALCIUM SPEC-SCNC: 9 MG/DL (ref 8.6–10.5)
CHLORIDE SERPL-SCNC: 100 MMOL/L (ref 98–107)
CO2 SERPL-SCNC: 28 MMOL/L (ref 22–29)
CREAT BLD-MCNC: 1.26 MG/DL (ref 0.76–1.27)
GFR SERPL CREATININE-BSD FRML MDRD: 57 ML/MIN/1.73
GLUCOSE BLD-MCNC: 105 MG/DL (ref 65–99)
GLUCOSE BLDC GLUCOMTR-MCNC: 107 MG/DL (ref 70–130)
GLUCOSE BLDC GLUCOMTR-MCNC: 123 MG/DL (ref 70–130)
GLUCOSE BLDC GLUCOMTR-MCNC: 223 MG/DL (ref 70–130)
GLUCOSE BLDC GLUCOMTR-MCNC: 252 MG/DL (ref 70–130)
POTASSIUM BLD-SCNC: 4.1 MMOL/L (ref 3.5–5.2)
PROCALCITONIN SERPL-MCNC: 0.07 NG/ML (ref 0.1–0.25)
SODIUM BLD-SCNC: 142 MMOL/L (ref 136–145)

## 2020-02-29 PROCEDURE — 25010000002 ENOXAPARIN PER 10 MG: Performed by: INTERNAL MEDICINE

## 2020-02-29 PROCEDURE — 63710000001 INSULIN LISPRO (HUMAN) PER 5 UNITS: Performed by: INTERNAL MEDICINE

## 2020-02-29 PROCEDURE — 84145 PROCALCITONIN (PCT): CPT | Performed by: INTERNAL MEDICINE

## 2020-02-29 PROCEDURE — 25010000002 FUROSEMIDE PER 20 MG: Performed by: INTERNAL MEDICINE

## 2020-02-29 PROCEDURE — 80048 BASIC METABOLIC PNL TOTAL CA: CPT | Performed by: INTERNAL MEDICINE

## 2020-02-29 PROCEDURE — 82962 GLUCOSE BLOOD TEST: CPT

## 2020-02-29 PROCEDURE — 94799 UNLISTED PULMONARY SVC/PX: CPT

## 2020-02-29 PROCEDURE — 63710000001 INSULIN GLARGINE PER 5 UNITS: Performed by: INTERNAL MEDICINE

## 2020-02-29 PROCEDURE — 71046 X-RAY EXAM CHEST 2 VIEWS: CPT

## 2020-02-29 PROCEDURE — 99233 SBSQ HOSP IP/OBS HIGH 50: CPT | Performed by: INTERNAL MEDICINE

## 2020-02-29 RX ORDER — CARVEDILOL 3.12 MG/1
3.12 TABLET ORAL 2 TIMES DAILY WITH MEALS
Status: DISCONTINUED | OUTPATIENT
Start: 2020-02-29 | End: 2020-03-01

## 2020-02-29 RX ADMIN — IPRATROPIUM BROMIDE AND ALBUTEROL SULFATE 3 ML: 2.5; .5 SOLUTION RESPIRATORY (INHALATION) at 06:52

## 2020-02-29 RX ADMIN — GABAPENTIN 300 MG: 300 CAPSULE ORAL at 21:22

## 2020-02-29 RX ADMIN — SPIRONOLACTONE 25 MG: 25 TABLET, FILM COATED ORAL at 08:21

## 2020-02-29 RX ADMIN — TAMSULOSIN HYDROCHLORIDE 0.4 MG: 0.4 CAPSULE ORAL at 21:19

## 2020-02-29 RX ADMIN — IPRATROPIUM BROMIDE AND ALBUTEROL SULFATE 3 ML: 2.5; .5 SOLUTION RESPIRATORY (INHALATION) at 14:58

## 2020-02-29 RX ADMIN — FUROSEMIDE 40 MG: 10 INJECTION, SOLUTION INTRAMUSCULAR; INTRAVENOUS at 21:19

## 2020-02-29 RX ADMIN — INSULIN LISPRO 6 UNITS: 100 INJECTION, SOLUTION INTRAVENOUS; SUBCUTANEOUS at 13:27

## 2020-02-29 RX ADMIN — TAMSULOSIN HYDROCHLORIDE 0.4 MG: 0.4 CAPSULE ORAL at 08:21

## 2020-02-29 RX ADMIN — HYDRALAZINE HYDROCHLORIDE 25 MG: 25 TABLET, FILM COATED ORAL at 16:01

## 2020-02-29 RX ADMIN — FUROSEMIDE 40 MG: 10 INJECTION, SOLUTION INTRAMUSCULAR; INTRAVENOUS at 08:21

## 2020-02-29 RX ADMIN — INSULIN LISPRO 4 UNITS: 100 INJECTION, SOLUTION INTRAVENOUS; SUBCUTANEOUS at 13:28

## 2020-02-29 RX ADMIN — HYDRALAZINE HYDROCHLORIDE 25 MG: 25 TABLET, FILM COATED ORAL at 21:19

## 2020-02-29 RX ADMIN — ENOXAPARIN SODIUM 40 MG: 40 INJECTION SUBCUTANEOUS at 14:21

## 2020-02-29 RX ADMIN — Medication 3 MG: at 21:19

## 2020-02-29 RX ADMIN — ASPIRIN 81 MG: 81 TABLET, COATED ORAL at 08:21

## 2020-02-29 RX ADMIN — INSULIN GLARGINE 25 UNITS: 100 INJECTION, SOLUTION SUBCUTANEOUS at 21:19

## 2020-02-29 RX ADMIN — HYDRALAZINE HYDROCHLORIDE 25 MG: 25 TABLET, FILM COATED ORAL at 08:21

## 2020-02-29 RX ADMIN — METOPROLOL TARTRATE 12.5 MG: 25 TABLET ORAL at 08:20

## 2020-02-29 RX ADMIN — INSULIN LISPRO 4 UNITS: 100 INJECTION, SOLUTION INTRAVENOUS; SUBCUTANEOUS at 21:19

## 2020-02-29 RX ADMIN — CARVEDILOL 3.12 MG: 3.12 TABLET, FILM COATED ORAL at 18:30

## 2020-03-01 LAB
ANION GAP SERPL CALCULATED.3IONS-SCNC: 9.9 MMOL/L (ref 5–15)
BACTERIA SPEC RESP CULT: NORMAL
BUN BLD-MCNC: 26 MG/DL (ref 8–23)
BUN/CREAT SERPL: 19.8 (ref 7–25)
CALCIUM SPEC-SCNC: 9.4 MG/DL (ref 8.6–10.5)
CHLORIDE SERPL-SCNC: 101 MMOL/L (ref 98–107)
CO2 SERPL-SCNC: 31.1 MMOL/L (ref 22–29)
CREAT BLD-MCNC: 1.31 MG/DL (ref 0.76–1.27)
GFR SERPL CREATININE-BSD FRML MDRD: 54 ML/MIN/1.73
GLUCOSE BLD-MCNC: 79 MG/DL (ref 65–99)
GLUCOSE BLDC GLUCOMTR-MCNC: 115 MG/DL (ref 70–130)
GLUCOSE BLDC GLUCOMTR-MCNC: 127 MG/DL (ref 70–130)
GLUCOSE BLDC GLUCOMTR-MCNC: 168 MG/DL (ref 70–130)
GLUCOSE BLDC GLUCOMTR-MCNC: 75 MG/DL (ref 70–130)
GRAM STN SPEC: NORMAL
POTASSIUM BLD-SCNC: 4.2 MMOL/L (ref 3.5–5.2)
SODIUM BLD-SCNC: 142 MMOL/L (ref 136–145)

## 2020-03-01 PROCEDURE — 94799 UNLISTED PULMONARY SVC/PX: CPT

## 2020-03-01 PROCEDURE — 99233 SBSQ HOSP IP/OBS HIGH 50: CPT | Performed by: INTERNAL MEDICINE

## 2020-03-01 PROCEDURE — 25010000002 FUROSEMIDE PER 20 MG: Performed by: INTERNAL MEDICINE

## 2020-03-01 PROCEDURE — 63710000001 INSULIN GLARGINE PER 5 UNITS: Performed by: INTERNAL MEDICINE

## 2020-03-01 PROCEDURE — 25010000002 ENOXAPARIN PER 10 MG: Performed by: INTERNAL MEDICINE

## 2020-03-01 PROCEDURE — 80048 BASIC METABOLIC PNL TOTAL CA: CPT | Performed by: INTERNAL MEDICINE

## 2020-03-01 PROCEDURE — 82962 GLUCOSE BLOOD TEST: CPT

## 2020-03-01 PROCEDURE — 63710000001 INSULIN LISPRO (HUMAN) PER 5 UNITS: Performed by: INTERNAL MEDICINE

## 2020-03-01 RX ORDER — NITROGLYCERIN 0.4 MG/1
0.4 TABLET SUBLINGUAL
Status: DISCONTINUED | OUTPATIENT
Start: 2020-03-01 | End: 2020-03-02 | Stop reason: HOSPADM

## 2020-03-01 RX ORDER — HYDRALAZINE HYDROCHLORIDE 50 MG/1
50 TABLET, FILM COATED ORAL 3 TIMES DAILY
Status: DISCONTINUED | OUTPATIENT
Start: 2020-03-01 | End: 2020-03-02

## 2020-03-01 RX ORDER — CARVEDILOL 6.25 MG/1
6.25 TABLET ORAL 2 TIMES DAILY WITH MEALS
Status: DISCONTINUED | OUTPATIENT
Start: 2020-03-01 | End: 2020-03-02

## 2020-03-01 RX ADMIN — SPIRONOLACTONE 25 MG: 25 TABLET, FILM COATED ORAL at 08:10

## 2020-03-01 RX ADMIN — INSULIN LISPRO 4 UNITS: 100 INJECTION, SOLUTION INTRAVENOUS; SUBCUTANEOUS at 12:05

## 2020-03-01 RX ADMIN — TAMSULOSIN HYDROCHLORIDE 0.4 MG: 0.4 CAPSULE ORAL at 21:35

## 2020-03-01 RX ADMIN — INSULIN LISPRO 7 UNITS: 100 INJECTION, SOLUTION INTRAVENOUS; SUBCUTANEOUS at 17:07

## 2020-03-01 RX ADMIN — HYDRALAZINE HYDROCHLORIDE 50 MG: 50 TABLET, FILM COATED ORAL at 21:35

## 2020-03-01 RX ADMIN — FUROSEMIDE 40 MG: 10 INJECTION, SOLUTION INTRAMUSCULAR; INTRAVENOUS at 21:35

## 2020-03-01 RX ADMIN — CARVEDILOL 6.25 MG: 6.25 TABLET, FILM COATED ORAL at 17:08

## 2020-03-01 RX ADMIN — IPRATROPIUM BROMIDE AND ALBUTEROL SULFATE 3 ML: 2.5; .5 SOLUTION RESPIRATORY (INHALATION) at 15:00

## 2020-03-01 RX ADMIN — IPRATROPIUM BROMIDE AND ALBUTEROL SULFATE 3 ML: 2.5; .5 SOLUTION RESPIRATORY (INHALATION) at 22:39

## 2020-03-01 RX ADMIN — HYDRALAZINE HYDROCHLORIDE 25 MG: 25 TABLET, FILM COATED ORAL at 08:10

## 2020-03-01 RX ADMIN — CARVEDILOL 3.12 MG: 3.12 TABLET, FILM COATED ORAL at 08:10

## 2020-03-01 RX ADMIN — INSULIN GLARGINE 25 UNITS: 100 INJECTION, SOLUTION SUBCUTANEOUS at 21:36

## 2020-03-01 RX ADMIN — HYDRALAZINE HYDROCHLORIDE 50 MG: 50 TABLET, FILM COATED ORAL at 15:40

## 2020-03-01 RX ADMIN — Medication 3 MG: at 21:35

## 2020-03-01 RX ADMIN — TAMSULOSIN HYDROCHLORIDE 0.4 MG: 0.4 CAPSULE ORAL at 08:10

## 2020-03-01 RX ADMIN — ENOXAPARIN SODIUM 40 MG: 40 INJECTION SUBCUTANEOUS at 14:03

## 2020-03-01 RX ADMIN — IPRATROPIUM BROMIDE AND ALBUTEROL SULFATE 3 ML: 2.5; .5 SOLUTION RESPIRATORY (INHALATION) at 06:44

## 2020-03-01 RX ADMIN — ASPIRIN 81 MG: 81 TABLET, COATED ORAL at 08:10

## 2020-03-01 RX ADMIN — INSULIN LISPRO 2 UNITS: 100 INJECTION, SOLUTION INTRAVENOUS; SUBCUTANEOUS at 12:06

## 2020-03-01 RX ADMIN — FUROSEMIDE 40 MG: 10 INJECTION, SOLUTION INTRAMUSCULAR; INTRAVENOUS at 08:10

## 2020-03-01 NOTE — PLAN OF CARE
Problem: Patient Care Overview  Goal: Plan of Care Review  Outcome: Ongoing (interventions implemented as appropriate)  Flowsheets (Taken 3/1/2020 0165)  Plan of Care Reviewed With: patient; spouse  Outcome Summary: B/P improved. Sat cont to drop during sleep. Sats well with occassional drops below 90% on 2LPM via NC. Awaiting further plan of care.

## 2020-03-01 NOTE — PROGRESS NOTES
"   LOS: 5 days   Patient Care Team:  Vishal Adrian Jr., MD as PCP - General (Internal Medicine)  Morris Cai MD as Consulting Physician (Sleep Medicine)  Michaela Hylton MD as Consulting Physician (Cardiology)  Hardy Banerjee MD as Consulting Physician (Ophthalmology)  Chula Christianson MD as Consulting Physician (Ophthalmology)  Jason Sherman MD (Endocrinology)  Perla Mayen MD as Consulting Physician (Nephrology)      Chief Complaint:  High blood pressure, volume overload    Interval History:   No CP, BP still labile          Objective   Vital Signs  Temp:  [97.2 °F (36.2 °C)-98.2 °F (36.8 °C)] 97.2 °F (36.2 °C)  Heart Rate:  [70-93] 83  Resp:  [16-20] 16  BP: (107-188)/() 188/98    Intake/Output Summary (Last 24 hours) at 3/1/2020 0943  Last data filed at 3/1/2020 0430  Gross per 24 hour   Intake --   Output 2325 ml   Net -2325 ml       Last Weight and Admission Weight        03/01/20  0500   Weight: 87.8 kg (193 lb 9.6 oz)     Flowsheet Rows      First Filed Value   Admission Height  175 cm (68.9\") Documented at 02/25/2020 1553   Admission Weight  90.7 kg (200 lb) Documented at 02/25/2020 1553              Physical Exam  Constitutional: He is oriented to person, place, and time. He appears well-developed and well-nourished. No distress.   Cardiovascular: Normal rate, regular rhythm, normal heart sounds and intact distal pulses.   Pulmonary/Chest: Effort normal and clear breath sounds. No respiratory distress.   NC O2 in place   Abdominal: Soft. Bowel sounds are normal.   Musculoskeletal: He exhibits edema (Trace to 1+, bilateral ankles). He exhibits no tenderness.   Neurological: He is alert and oriented to person, place, and time.   Skin: Skin is warm and dry. No erythema.   Psychiatric: He has a normal mood and affect.       Results Review:      Results from last 7 days   Lab Units 03/01/20  0611 02/29/20  0714 02/28/20  0608   SODIUM mmol/L 142 142 142   POTASSIUM mmol/L " 4.2 4.1 3.7   CHLORIDE mmol/L 101 100 100   CO2 mmol/L 31.1* 28.0 28.8   BUN mg/dL 26* 25* 21   CREATININE mg/dL 1.31* 1.26 1.18   GLUCOSE mg/dL 79 105* 81   CALCIUM mg/dL 9.4 9.0 9.3     Results from last 7 days   Lab Units 02/25/20  1856 02/25/20  1643   TROPONIN T ng/mL 0.039* 0.049*     Results from last 7 days   Lab Units 02/25/20  1643   WBC 10*3/mm3 7.58   HEMOGLOBIN g/dL 14.2   HEMATOCRIT % 43.7   PLATELETS 10*3/mm3 226     Results from last 7 days   Lab Units 02/25/20  1643   INR  1.13*   APTT seconds 34.9                   I reviewed the patient's new clinical results.  I reviewed patient's EKG/Telemetry data            Medication Review:     aspirin 81 mg Oral Daily   carvedilol 6.25 mg Oral BID With Meals   enoxaparin 40 mg Subcutaneous Q24H   furosemide 40 mg Intravenous BID   gabapentin 300 mg Oral Nightly   hydrALAZINE 50 mg Oral TID   insulin glargine 25 Units Subcutaneous Nightly   insulin lispro 0-9 Units Subcutaneous 4x Daily With Meals & Nightly   insulin lispro 4 Units Subcutaneous Daily Before Lunch   insulin lispro 7 Units Subcutaneous Daily Before Supper   ipratropium-albuterol 3 mL Nebulization Q8H - RT   melatonin 3 mg Oral Nightly   spironolactone 25 mg Oral Daily   tamsulosin 0.4 mg Oral BID                Assessment/Plan     1. Hypertension: Blood pressure goal less than 130/80.  BP still poorly controlled- currently 188/98.  K+ stable on spironolactone. Wii advance carvedilol and hydralazine     2.  Chronic diastolic CHF: Fluid status appears to be improving. IV diuresis per nephrology     3.  CKD: nephrology following and managing diuresis; appreciate their assistance.     4.  Untreated COCO: Previously used BiPAP but has not used since CABG 7/2019.     5.  Hypoxia: Continue diuresis.  Fluid status appears to be improving. Pulm is evaluating     6.  Type 2 diabetes    7.  NSVT- no further, follow on beta blockade        Bhargav Black III, MD  03/01/20  9:43 AM

## 2020-03-01 NOTE — PROGRESS NOTES
Federico Hebert MD                          [phone]      Patient ID:    Name:  Dilip Verma    MRN:  [id number]    1950   69 y.o.  male            Patient Care Team:  Vishal Adrian Jr., MD as PCP - General (Internal Medicine)  Morris Cai MD as Consulting Physician (Sleep Medicine)  Michaela Hylton MD as Consulting Physician (Cardiology)  Hardy Banerjee MD as Consulting Physician (Ophthalmology)  Chula Christianson MD as Consulting Physician (Ophthalmology)  Jason Sherman MD (Endocrinology)  Perla Mayen MD as Consulting Physician (Nephrology)    CC/ Reason for visit: Resp failure, CHF, COCO    Subjective: Pt seen and examined this AM. No acute overnight events noted. Doing better.  Was able to wean off oxygen. Feels that he is doing better and is responding to diuretics.  Some worsening renal dysfunction today    ROS: Denies any subjective fevers, syncope or presyncopal events, new neurological deficits, nausea or vomiting currently    Objective     Vital Signs past 24hrs    BP range: BP: (107-188)/(58-99) 152/85  Pulse range: Heart Rate:  [70-93] 84  Resp rate range: Resp:  [16-18] 16  Temp range: Temp (24hrs), Av.7 °F (36.5 °C), Min:97.2 °F (36.2 °C), Max:98.5 °F (36.9 °C)      Ventilator/Non-Invasive Ventilation Settings (From admission, onward)    None          Device (Oxygen Therapy): room air       87.8 kg (193 lb 9.6 oz); Body mass index is 28.67 kg/m².      Intake/Output Summary (Last 24 hours) at 3/1/2020 1831  Last data filed at 3/1/2020 1814  Gross per 24 hour   Intake 430 ml   Output 3775 ml   Net -3345 ml       PHYSICAL EXAM   Constitutional: Middle aged pt in bed, No acute respiratory distress, No accessory muscle use  Head: - NCAT  Eyes: No pallor.  Anicteric sclerae, EOMI.  ENMT:  Mallampati 3, no oral thrush. Dry MM.   NECK: Trachea midline, No thyromegaly, no palpable cervical lymphadenopathy  Heart: RRR, no  murmur.Trace pedal edema   Lungs: MAHSA +, No wheezes/ crackles heard    Abdomen: Soft. No tenderness, guarding or rigidity. No palpable masses  Extremities: Extremities warm and well perfused. No cyanosis/ clubbing  Neuro: Conscious, answers appropriately, no gross focal neuro deficits  Psych: Mood and affect appropriate    Scheduled meds:      aspirin 81 mg Oral Daily   carvedilol 6.25 mg Oral BID With Meals   enoxaparin 40 mg Subcutaneous Q24H   furosemide 40 mg Intravenous BID   gabapentin 300 mg Oral Nightly   hydrALAZINE 50 mg Oral TID   insulin glargine 25 Units Subcutaneous Nightly   insulin lispro 0-9 Units Subcutaneous 4x Daily With Meals & Nightly   insulin lispro 4 Units Subcutaneous Daily Before Lunch   insulin lispro 7 Units Subcutaneous Daily Before Supper   ipratropium-albuterol 3 mL Nebulization Q8H - RT   melatonin 3 mg Oral Nightly   spironolactone 25 mg Oral Daily   tamsulosin 0.4 mg Oral BID       IV meds:                           Data Review:      Results from last 7 days   Lab Units 03/01/20  0611 02/29/20  0714 02/28/20  0608  02/25/20  1643   SODIUM mmol/L 142 142 142   < > 143   POTASSIUM mmol/L 4.2 4.1 3.7   < > 4.6   CHLORIDE mmol/L 101 100 100   < > 103   CO2 mmol/L 31.1* 28.0 28.8   < > 30.0*   BUN mg/dL 26* 25* 21   < > 19   CREATININE mg/dL 1.31* 1.26 1.18   < > 1.16   CALCIUM mg/dL 9.4 9.0 9.3   < > 9.4   BILIRUBIN mg/dL  --   --   --   --  0.9   ALK PHOS U/L  --   --   --   --  72   ALT (SGPT) U/L  --   --   --   --  20   AST (SGOT) U/L  --   --   --   --  21   GLUCOSE mg/dL 79 105* 81   < > 239*   WBC 10*3/mm3  --   --   --   --  7.58   HEMOGLOBIN g/dL  --   --   --   --  14.2   PLATELETS 10*3/mm3  --   --   --   --  226   INR   --   --   --   --  1.13*   PROBNP pg/mL  --   --  1,908.0*  --  2,824.0*   PROCALCITONIN ng/mL  --  0.07*  --   --  0.05*    < > = values in this interval not displayed.       Lab Results   Component Value Date    CALCIUM 9.4 03/01/2020    PHOS 4.5  07/21/2019       Results from last 7 days   Lab Units 02/28/20  0917   RESPCX  Moderate growth (3+) Normal Respiratory Sussy              Results Review:    I have reviewed the relevant laboratory results and independently reviewed the chest imaging from this hospitalization including the available echocardiogram reports personally and summarized it if/ when appropriate below    Assessment    Acute hypoxic respiratory failure  Acute on chronic diastolic heart failure  Mixed central and obstructive sleep apnea -intolerant to PAP  Pulmonary hypertension - RVSP 68  CKD 3  Hypertension  CAD status post CABG  Morbid obesity  Medical noncompliance    PLAN:  Patient making progress with regards to respiratory failure. Do not see any obvious pulmonary pathology to explain hypoxemia.  Cardiology and nephrology on board and managing diuresis  Will need reevaluation of pulmonary hypertension concerns once sleep apnea is treated appropriately and patient is euvolemic.   Asked him to get his home BiPAP to use in the hospital tonight     Discussed with Dr. Cai who is his sleep physician who states that he did not follow-up with his clinic as recommended and he needs to be seen in the clinic once discharged and will need an in lab sleep titration to qualify for the new machine.  Explained this in detail to the patient as well as wife at bedside and asked her to be compliant with his appointment.    I have discussed my findings and recommendations with patient, family and nursing staff.     Federico Hebert MD  3/1/2020

## 2020-03-01 NOTE — PROGRESS NOTES
"                                                                                        [facility] Follow up Note        PATIENT IDENTIFICATION     Name: Dilip Verma  Age: 69 y.o.  Sex: male  :  1950  MRN: [id number]       CHIEF COMPLIANTS / REASON FOR FOLLOW UP          ckd        Subjective:      2020, patient seen and examined, he has no new complaints, overall feeling much better.  Review of Systems:          Constitutional: No fever, no chills, no lethargy, no weakness.  HEENT:  No headache, otalgia, itchy eyes, nasal discharge or sore throat.  Cardiac:  No chest pain, dyspnea, orthopnea or PND.  Chest:  No cough, phlegm or wheezing.  Abdomen:  No abdominal pain, nausea or vomiting.  Neuro:  No focal weakness, abnormal movements or seizure-like activity.  :   No hematuria, no pyuria, no dysuria, no flank pain.  Extremities:  No  joint pains.  ROS was otherwise negative except as mentioned in the [place].        OBJECTIVE                                                                        Exam:  BP (!) 188/98 (BP Location: Left arm, Patient Position: Lying) Comment: Notify RN Laura  Pulse 83   Temp 97.2 °F (36.2 °C) (Oral)   Resp 16   Ht 175 cm (68.9\")   Wt 87.8 kg (193 lb 9.6 oz)   SpO2 93%   BMI 28.67 kg/m²   Intake/Output last 3 shifts:  I/O last 3 completed shifts:  In: 240 [P.O.:240]  Out: 3300 [Urine:3300]  Intake/Output this shift:  No intake/output data recorded.    General Appearance:  Alert, cooperative, no distress, appears stated age  Head:  Normocephalic, without obvious abnormality, atraumatic  Eyes:  Sclerae anicteric, EOM's intact     Neck:  Supple,  no adenopathy;      Lungs:   Clear to auscultation bilaterally, respirations unlabored  Heart:  Regular rate and rhythm, S1 and S2 normal, no  rub   or gallop  Abdomen:  Soft, nontender,    no masses, no hepatomegaly, no splenomegaly  Extremities:  Extremities normal, no edema  Neurologic:   Alert " and oriented, no focal deficits    Scheduled Meds:    aspirin 81 mg Oral Daily   carvedilol 3.125 mg Oral BID With Meals   enoxaparin 40 mg Subcutaneous Q24H   furosemide 40 mg Intravenous BID   gabapentin 300 mg Oral Nightly   hydrALAZINE 25 mg Oral TID   insulin glargine 25 Units Subcutaneous Nightly   insulin lispro 0-9 Units Subcutaneous 4x Daily With Meals & Nightly   insulin lispro 4 Units Subcutaneous Daily Before Lunch   insulin lispro 7 Units Subcutaneous Daily Before Supper   ipratropium-albuterol 3 mL Nebulization Q8H - RT   melatonin 3 mg Oral Nightly   spironolactone 25 mg Oral Daily   tamsulosin 0.4 mg Oral BID     Continuous Infusions:   PRN Meds:bisacodyl  •  dextrose  •  dextrose  •  glucagon (human recombinant)  •  pramipexole  •  traZODone         Data Review:                                                                           CBC:   Results from last 7 days   Lab Units 02/25/20  1643   WBC 10*3/mm3 7.58   RBC 10*6/mm3 4.79     BMP:   Results from last 7 days   Lab Units 03/01/20  0611   GLUCOSE mg/dL 79   CO2 mmol/L 31.1*   BUN mg/dL 26*   CREATININE mg/dL 1.31*   CALCIUM mg/dL 9.4     ABGs:       Invalid input(s): PO2       Imaging:                                                                                          ASSESSMENT:                                                                                Diabetes mellitus, type II (CMS/HCC)    CAD (coronary artery disease)    Chronic diastolic CHF (congestive heart failure) (CMS/HCC)    CSA (central sleep apnea)    Labile hypertension    Hypoxia        Hypertension:review metanephrines,normetanephrine,kenzie,rening levels done for secondary hypertension .  History of poorly controlled blood pressure for more than one year.     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     Cad:s/p cabg x 3.7/18/19.     S/p nstemi.     · Chf/pulmonary hypertension:Left ventricular systolic function  is normal. Calculated EF = 55.0%.  · Left ventricular diastolic dysfunction is noted (grade III w/high LAP) consistent with fixed restricive pattern  · Normal right ventricular cavity size and systolic function noted.  · Left atrial cavity size is mildly dilated.  Estimated right ventricular systolic pressure from tricuspid regurgitation is markedly elevated (>55 mmHg). Calculated right ventricular systolic pressure from tricuspid regurgitation is 68 mmHg.     Elevated iPth:214 7/22/19.         PLAN:                                                                              Volume is stable, renal function is stable, presently on Lasix 40 mg IV every 12 hours and Aldactone 25 mg daily, will continue, can be switched to Lasix 40 mg oral route twice daily on discharge  Remains in negative balance  Electrolytes are acceptable  Blood pressure is elevated, discussed with cardiology, Dr. Black, plan to increase Coreg and hydralazine  Follow renal function,volume  Outpatient follow-up as already scheduled once discharged from the hospital  Overall stable from renal standpoint  Thanks.           Perla Mayen MD  [facility]  3/1/2020  9:41 AM

## 2020-03-01 NOTE — PLAN OF CARE
Problem: Patient Care Overview  Goal: Plan of Care Review  Outcome: Ongoing (interventions implemented as appropriate)  Flowsheets (Taken 3/1/2020 1728)  Progress: improving  Plan of Care Reviewed With: patient; spouse  Outcome Summary: Pt O2 has been off today with stats ranging from mid 80's to mid 90's depending on patient's position and/or sleeping.  Pt upset today thinking he was getting discharged.  Education done with patient and wife about the importance of getting his BP at an acceptable level.  Education done on the increase of medication and changes at home that need to be done to help with BP.  Both verbalized understanding and stated he does that already.  Wife bathed patient today.  Pt stable and will continue to monitor.  Goal: Individualization and Mutuality  Outcome: Ongoing (interventions implemented as appropriate)  Goal: Discharge Needs Assessment  Outcome: Ongoing (interventions implemented as appropriate)  Goal: Interprofessional Rounds/Family Conf  Outcome: Ongoing (interventions implemented as appropriate)     Problem: Hypertensive Disease/Crisis (Arterial) (Adult)  Goal: Signs and Symptoms of Listed Potential Problems Will be Absent, Minimized or Managed (Hypertensive Disease/Crisis)  Outcome: Ongoing (interventions implemented as appropriate)     Problem: Social/Occupational/Functional Impairment (Posttraumatic Stress Disorder) (Adult)  Goal: Improved Social/Occupational/Functional Skills (Posttraumatic Stress Disorder)  Outcome: Ongoing (interventions implemented as appropriate)     Problem: Safety Awareness Impairment (Posttraumatic Stress Disorder) (Adult)  Goal: Enhanced Safety Awareness (Posttraumatic Stress Disorder)  Outcome: Ongoing (interventions implemented as appropriate)     Problem: Pain, Chronic (Adult)  Goal: Acceptable Pain/Comfort Level and Functional Ability  Outcome: Ongoing (interventions implemented as appropriate)     Problem: Skin Injury Risk (Adult)  Goal: Skin  Health and Integrity  Outcome: Ongoing (interventions implemented as appropriate)     Problem: Fall Risk (Adult)  Goal: Absence of Fall  Outcome: Ongoing (interventions implemented as appropriate)

## 2020-03-02 VITALS
DIASTOLIC BLOOD PRESSURE: 69 MMHG | SYSTOLIC BLOOD PRESSURE: 136 MMHG | RESPIRATION RATE: 20 BRPM | TEMPERATURE: 97.8 F | BODY MASS INDEX: 28.75 KG/M2 | HEART RATE: 80 BPM | HEIGHT: 69 IN | WEIGHT: 194.1 LBS | OXYGEN SATURATION: 92 %

## 2020-03-02 LAB
ANION GAP SERPL CALCULATED.3IONS-SCNC: 8.8 MMOL/L (ref 5–15)
BUN BLD-MCNC: 32 MG/DL (ref 8–23)
BUN/CREAT SERPL: 25.6 (ref 7–25)
CALCIUM SPEC-SCNC: 9.4 MG/DL (ref 8.6–10.5)
CHLORIDE SERPL-SCNC: 99 MMOL/L (ref 98–107)
CO2 SERPL-SCNC: 29.2 MMOL/L (ref 22–29)
CREAT BLD-MCNC: 1.25 MG/DL (ref 0.76–1.27)
GFR SERPL CREATININE-BSD FRML MDRD: 57 ML/MIN/1.73
GLUCOSE BLD-MCNC: 190 MG/DL (ref 65–99)
GLUCOSE BLDC GLUCOMTR-MCNC: 176 MG/DL (ref 70–130)
GLUCOSE BLDC GLUCOMTR-MCNC: 279 MG/DL (ref 70–130)
PLATELET # BLD AUTO: 214 10*3/MM3 (ref 140–450)
POTASSIUM BLD-SCNC: 4.3 MMOL/L (ref 3.5–5.2)
SODIUM BLD-SCNC: 137 MMOL/L (ref 136–145)

## 2020-03-02 PROCEDURE — 25010000002 FUROSEMIDE PER 20 MG: Performed by: INTERNAL MEDICINE

## 2020-03-02 PROCEDURE — 99239 HOSP IP/OBS DSCHRG MGMT >30: CPT | Performed by: INTERNAL MEDICINE

## 2020-03-02 PROCEDURE — 94799 UNLISTED PULMONARY SVC/PX: CPT

## 2020-03-02 PROCEDURE — 25010000002 ENOXAPARIN PER 10 MG: Performed by: INTERNAL MEDICINE

## 2020-03-02 PROCEDURE — 82962 GLUCOSE BLOOD TEST: CPT

## 2020-03-02 PROCEDURE — 80048 BASIC METABOLIC PNL TOTAL CA: CPT | Performed by: INTERNAL MEDICINE

## 2020-03-02 PROCEDURE — 63710000001 INSULIN LISPRO (HUMAN) PER 5 UNITS: Performed by: INTERNAL MEDICINE

## 2020-03-02 PROCEDURE — 85049 AUTOMATED PLATELET COUNT: CPT | Performed by: INTERNAL MEDICINE

## 2020-03-02 RX ORDER — CARVEDILOL 12.5 MG/1
12.5 TABLET ORAL 2 TIMES DAILY WITH MEALS
Qty: 60 TABLET | Refills: 3 | Status: SHIPPED | OUTPATIENT
Start: 2020-03-02 | End: 2020-07-24 | Stop reason: ALTCHOICE

## 2020-03-02 RX ORDER — HYDRALAZINE HYDROCHLORIDE 50 MG/1
100 TABLET, FILM COATED ORAL 3 TIMES DAILY
Status: DISCONTINUED | OUTPATIENT
Start: 2020-03-02 | End: 2020-03-02 | Stop reason: HOSPADM

## 2020-03-02 RX ORDER — HYDRALAZINE HYDROCHLORIDE 100 MG/1
100 TABLET, FILM COATED ORAL 3 TIMES DAILY
Qty: 90 TABLET | Refills: 3 | Status: SHIPPED | OUTPATIENT
Start: 2020-03-02 | End: 2020-06-04 | Stop reason: DRUGHIGH

## 2020-03-02 RX ORDER — CARVEDILOL 6.25 MG/1
6.25 TABLET ORAL ONCE
Status: COMPLETED | OUTPATIENT
Start: 2020-03-02 | End: 2020-03-02

## 2020-03-02 RX ORDER — SPIRONOLACTONE 25 MG/1
25 TABLET ORAL DAILY
Qty: 30 TABLET | Refills: 3 | Status: SHIPPED | OUTPATIENT
Start: 2020-03-03 | End: 2020-07-24 | Stop reason: ALTCHOICE

## 2020-03-02 RX ORDER — CARVEDILOL 12.5 MG/1
12.5 TABLET ORAL 2 TIMES DAILY WITH MEALS
Status: DISCONTINUED | OUTPATIENT
Start: 2020-03-02 | End: 2020-03-02 | Stop reason: HOSPADM

## 2020-03-02 RX ADMIN — ENOXAPARIN SODIUM 40 MG: 40 INJECTION SUBCUTANEOUS at 15:00

## 2020-03-02 RX ADMIN — INSULIN LISPRO 6 UNITS: 100 INJECTION, SOLUTION INTRAVENOUS; SUBCUTANEOUS at 12:47

## 2020-03-02 RX ADMIN — CARVEDILOL 12.5 MG: 12.5 TABLET, FILM COATED ORAL at 17:04

## 2020-03-02 RX ADMIN — ASPIRIN 81 MG: 81 TABLET, COATED ORAL at 10:29

## 2020-03-02 RX ADMIN — CARVEDILOL 6.25 MG: 6.25 TABLET, FILM COATED ORAL at 10:33

## 2020-03-02 RX ADMIN — FUROSEMIDE 40 MG: 10 INJECTION, SOLUTION INTRAMUSCULAR; INTRAVENOUS at 10:30

## 2020-03-02 RX ADMIN — CARVEDILOL 6.25 MG: 6.25 TABLET, FILM COATED ORAL at 12:46

## 2020-03-02 RX ADMIN — TAMSULOSIN HYDROCHLORIDE 0.4 MG: 0.4 CAPSULE ORAL at 10:29

## 2020-03-02 RX ADMIN — IPRATROPIUM BROMIDE AND ALBUTEROL SULFATE 3 ML: 2.5; .5 SOLUTION RESPIRATORY (INHALATION) at 06:54

## 2020-03-02 RX ADMIN — IPRATROPIUM BROMIDE AND ALBUTEROL SULFATE 3 ML: 2.5; .5 SOLUTION RESPIRATORY (INHALATION) at 15:54

## 2020-03-02 RX ADMIN — HYDRALAZINE HYDROCHLORIDE 50 MG: 50 TABLET, FILM COATED ORAL at 10:29

## 2020-03-02 RX ADMIN — HYDRALAZINE HYDROCHLORIDE 100 MG: 50 TABLET, FILM COATED ORAL at 17:04

## 2020-03-02 RX ADMIN — SPIRONOLACTONE 25 MG: 25 TABLET, FILM COATED ORAL at 10:29

## 2020-03-02 RX ADMIN — INSULIN LISPRO 4 UNITS: 100 INJECTION, SOLUTION INTRAVENOUS; SUBCUTANEOUS at 12:46

## 2020-03-02 NOTE — PLAN OF CARE
Problem: Patient Care Overview  Goal: Plan of Care Review  Outcome: Ongoing (interventions implemented as appropriate)  Flowsheets (Taken 3/2/2020 1546)  Progress: improving  Plan of Care Reviewed With: patient  Outcome Summary: Discharge orders     Problem: Hypertensive Disease/Crisis (Arterial) (Adult)  Goal: Signs and Symptoms of Listed Potential Problems Will be Absent, Minimized or Managed (Hypertensive Disease/Crisis)  Outcome: Ongoing (interventions implemented as appropriate)  Flowsheets (Taken 3/2/2020 1546)  Problems Assessed (Hypertensive Disease/Crisis (Arterial)): all  Problems Present (Hypertensive Disease): situational response     Problem: Pain, Chronic (Adult)  Goal: Acceptable Pain/Comfort Level and Functional Ability  Outcome: Ongoing (interventions implemented as appropriate)  Flowsheets (Taken 3/2/2020 1546)  Acceptable Pain/Comfort Level and Functional Ability: making progress toward outcome     Problem: Skin Injury Risk (Adult)  Goal: Skin Health and Integrity  Outcome: Ongoing (interventions implemented as appropriate)  Flowsheets (Taken 3/2/2020 1546)  Skin Health and Integrity: making progress toward outcome     Problem: Fall Risk (Adult)  Goal: Absence of Fall  Outcome: Ongoing (interventions implemented as appropriate)  Flowsheets (Taken 3/2/2020 1546)  Absence of Fall: making progress toward outcome

## 2020-03-02 NOTE — PROGRESS NOTES
[facility] INPATIENT PROGRESS NOTE         [address]    3/2/2020      PATIENT IDENTIFICATION:  Name: Dilip Verma ADMIT: 2020   : 1950  PCP: Vishal Adrian Jr., MD    MRN: [id number] LOS: 6 days   AGE/SEX: 69 y.o. male  [facility]                     LOS 6    Reason for visit: Follow-up respiratory failure with sleep apnea and pulmonary hypertension      SUBJECTIVE:      He slept with the sleep machine he states but then his wife said he worked for about 20 minutes before having to take it off.  They later put oxygen through the machine and he said that he did better with that.  No productive cough or chest pain.  They are hoping to go home.  He follows with Dr. Cai in the sleep clinic and will need to have follow-up in the clinic after discharge.  He says he has an appointment tomorrow.    Objective   OBJECTIVE:    Vital Sign Min/Max for last 24 hours  Temp  Min: 97.8 °F (36.6 °C)  Max: 98.5 °F (36.9 °C)   BP  Min: 133/68  Max: 174/83   Pulse  Min: 65  Max: 87   Resp  Min: 16  Max: 18   SpO2  Min: 84 %  Max: 98 %   No data recorded   Weight  Min: 88 kg (194 lb 1.6 oz)  Max: 88 kg (194 lb 1.6 oz)                         Body mass index is 28.75 kg/m².    Intake/Output Summary (Last 24 hours) at 3/2/2020 1405  Last data filed at 3/2/2020 1247  Gross per 24 hour   Intake 1200 ml   Output 2500 ml   Net -1300 ml         Exam:  GEN:  No distress, appears stated age  EYES:   PERRL, anicteric sclera  ENT:    External ears/nose normal, OP clear  NECK:  No adenopathy, midline trachea  LUNGS: Normal chest on inspection, palpation and auscultation  CV:  Normal S1S2, without murmur  ABD:  Non tender, non distended, no hepatosplenomegaly, +BS  EXT:  No edema, cyanosis or clubbing    Scheduled meds:    aspirin 81 mg Oral Daily   carvedilol 12.5 mg Oral BID With Meals   enoxaparin 40 mg Subcutaneous Q24H   furosemide 40 mg Intravenous BID   gabapentin 300 mg Oral Nightly   hydrALAZINE  100 mg Oral TID   insulin glargine 25 Units Subcutaneous Nightly   insulin lispro 0-9 Units Subcutaneous 4x Daily With Meals & Nightly   insulin lispro 4 Units Subcutaneous Daily Before Lunch   insulin lispro 7 Units Subcutaneous Daily Before Supper   ipratropium-albuterol 3 mL Nebulization Q8H - RT   melatonin 3 mg Oral Nightly   spironolactone 25 mg Oral Daily   tamsulosin 0.4 mg Oral BID     IV meds:                         Data Review:  Results from last 7 days   Lab Units 03/02/20  0654 03/01/20  0611 02/29/20  0714 02/28/20  0608 02/27/20  0653   SODIUM mmol/L 137 142 142 142 147*   POTASSIUM mmol/L 4.3 4.2 4.1 3.7 3.8   CHLORIDE mmol/L 99 101 100 100 103   CO2 mmol/L 29.2* 31.1* 28.0 28.8 29.1*   BUN mg/dL 32* 26* 25* 21 17   CREATININE mg/dL 1.25 1.31* 1.26 1.18 1.21   GLUCOSE mg/dL 190* 79 105* 81 99   CALCIUM mg/dL 9.4 9.4 9.0 9.3 9.1         Estimated Creatinine Clearance: 60.3 mL/min (by C-G formula based on SCr of 1.25 mg/dL).  Results from last 7 days   Lab Units 03/02/20  0654 02/25/20  1643   WBC 10*3/mm3  --  7.58   HEMOGLOBIN g/dL  --  14.2   PLATELETS 10*3/mm3 214 226     Results from last 7 days   Lab Units 02/25/20  1643   INR  1.13*     Results from last 7 days   Lab Units 02/25/20  1643   ALT (SGPT) U/L 20   AST (SGOT) U/L 21         Results from last 7 days   Lab Units 02/29/20  0714 02/25/20  1643   PROCALCITONIN ng/mL 0.07* 0.05*             )Assessment   ASSESSMENT:      Active Hospital Problems    Diagnosis  POA   • Labile hypertension [R09.89]  Unknown   • Hypoxia [R09.02]  Unknown   • CSA (central sleep apnea) [G47.31]  Yes   • Chronic diastolic CHF (congestive heart failure) (CMS/HCC) [I50.32]  Yes   • CAD (coronary artery disease) [I25.10]  Yes   • Diabetes mellitus, type II (CMS/[facility]) [E11.9]  Yes      Resolved Hospital Problems   No resolved problems to display.     Acute hypoxic respiratory failure  Acute on chronic diastolic heart failure  Mixed central and obstructive sleep apnea  -intolerant to PAP  Pulmonary hypertension - RVSP 68  CKD 3  Hypertension  CAD status post CABG  Morbid obesity  Medical noncompliance      PLAN:    Encourage pulmonary toilet.  Encourage BiPAP use.  Follows with Dr. Cai in sleep clinic.  Okay from my standpoint for discharge.    Fernando Nielson MD  Pulmonary and Critical Care Medicine  [facility], [facility]  3/2/2020    2:05 PM

## 2020-03-02 NOTE — PROGRESS NOTES
Continued Stay Note  [facility]     Patient Name: Dilip Verma  MRN: [id number]  Today's Date: 3/2/2020    Admit Date: 2/25/2020    Discharge Plan     Row Name 03/02/20 1344       Plan    Plan  Plans are home. CCP will follow. Deyanira Starr RN/CCP        Discharge Codes    No documentation.       Expected Discharge Date and Time     Expected Discharge Date Expected Discharge Time    Mar 3, 2020             Deyanira Starr RN

## 2020-03-02 NOTE — DISCHARGE SUMMARY
Patient Name: Dilip Verma  :1950  69 y.o.    Date of Admit: 2020  Date of Discharge:  3/2/2020    Discharge Diagnosis:  Problem List Items Addressed This Visit     None      Visit Diagnoses     Hypertensive urgency    -  Primary    Relevant Medications    hydrALAZINE (APRESOLINE) 100 MG tablet    carvedilol (COREG) 12.5 MG tablet    spironolactone (ALDACTONE) 25 MG tablet (Start on 3/3/2020)    Chest pain, unspecified type              Hospital Course:     1.  Acute on chronic diastolic congestive heart failure.  He has grade 2 diastolic dysfunction.  Diuretics are being managed by nephrology.  Nephrology is okay with discharge on oral Lasix.  2.  Coronary artery disease.  His last heart catheterization was in 2019.  This showed widely patent bypass grafts with multivessel native disease and elevated biventricular filling pressures.  Known history of CABG x3 in 2019.  3.  Systemic hypertension.  His blood pressure was high this morning so I adjusted his medications.  I just rechecked in the room and his blood pressure looks much improved.  4.  Obstructive sleep apnea.  He follows with Dr. Cai.  He wears BiPAP at night.  It is very important that he is compliant with this.  5.  Psychiatric issues/anxiety/PTSD/night terrors.   6.  Mild bilateral carotid artery disease.  7.  Type II myocardial infarction.    His blood pressure has improved with increased carvedilol and hydralazine.  He is highly anxious to go home today.  I encouraged him to stay another day for further monitoring but he and his wife are very anxious to get home and feel he will do better there.  They are going to monitor his heart rate, blood pressure and pulse ox levels.  He has a machine at home and his wife is going to monitor his oxygen levels and he is going to follow-up with Dr. Adrian next week to follow-up on his blood pressure and his oxygen levels.  I also encouraged him to follow-up with Dr. Cai  for treatment of his obstructive sleep apnea.         Consults     Date and Time Order Name Status Description    2/27/2020 0900 Inpatient Pulmonology Consult Completed     2/26/2020 0936 Inpatient Nephrology Consult      2/25/2020 1953 GÉNESIS (on-call MD unless specified) Completed           Pertinent Test Results:   Results from last 7 days   Lab Units 03/02/20  0654  02/25/20  1643   SODIUM mmol/L 137   < > 143   POTASSIUM mmol/L 4.3   < > 4.6   CHLORIDE mmol/L 99   < > 103   CO2 mmol/L 29.2*   < > 30.0*   BUN mg/dL 32*   < > 19   CREATININE mg/dL 1.25   < > 1.16   CALCIUM mg/dL 9.4   < > 9.4   BILIRUBIN mg/dL  --   --  0.9   ALK PHOS U/L  --   --  72   ALT (SGPT) U/L  --   --  20   AST (SGOT) U/L  --   --  21   GLUCOSE mg/dL 190*   < > 239*    < > = values in this interval not displayed.     Results from last 7 days   Lab Units 02/25/20  1856 02/25/20  1643   TROPONIN T ng/mL 0.039* 0.049*       Results from last 7 days   Lab Units 03/02/20  0654 02/25/20  1643   WBC 10*3/mm3  --  7.58   HEMOGLOBIN g/dL  --  14.2   HEMATOCRIT %  --  43.7   PLATELETS 10*3/mm3 214 226     Results from last 7 days   Lab Units 02/25/20  1643   INR  1.13*   APTT seconds 34.9               Condition on Discharge: stable    Discharge Medications     Discharge Medications      New Medications      Instructions Start Date   carvedilol 12.5 MG tablet  Commonly known as:  COREG   12.5 mg, Oral, 2 Times Daily With Meals      spironolactone 25 MG tablet  Commonly known as:  ALDACTONE   25 mg, Oral, Daily   Start Date:  March 3, 2020        Changes to Medications      Instructions Start Date   gabapentin 300 MG capsule  Commonly known as:  NEURONTIN  What changed:    · when to take this  · reasons to take this   300 mg, Oral, Nightly      hydrALAZINE 100 MG tablet  Commonly known as:  APRESOLINE  What changed:    · medication strength  · how much to take   100 mg, Oral, 3 Times Daily         Continue These Medications      Instructions Start  Date   ANDROGEL 50 MG/5GM (1%) gel gel  Generic drug:  testosterone   Every 24 Hours      aspirin 81 MG EC tablet   81 mg, Oral, Daily      CBD oil capsule  Commonly known as:  cannabidiol   Oral, Every Evening      furosemide 40 MG tablet  Commonly known as:  LASIX   40 mg, Oral, 2 Times Daily      HUMALOG 100 UNIT/ML injection  Generic drug:  insulin lispro   Subcutaneous, 3 Times Daily Before Meals, PER SS -  Currently taking 4 units at noon and 7 units in pm plus 2 units for -250, 4 units for -300, 6 units for -350      melatonin 3 MG tablet   3 mg, Oral, Nightly      pramipexole 0.25 MG tablet  Commonly known as:  MIRAPEX   0.25 mg, Oral, 2 Times Daily PRN      sildenafil 20 MG tablet  Commonly known as:  REVATIO   1-3 tablets 1 hour before sex begin with the lowest dose at 1 tablet initially 3 tablets for 24 hours max      STAXYN 10 MG tablet dispersible  Generic drug:  Vardenafil HCl   DISSOLVE 1 TABLET IN MOUTH ONE TIME A DAY AS NEEDED      tamsulosin 0.4 MG capsule 24 hr capsule  Commonly known as:  FLOMAX   1 capsule, Oral, 2 Times Daily, Wife states he takes it when he has trouble voiding, Also taking for high blood pressure.      traZODone 100 MG tablet  Commonly known as:  DESYREL   100 mg, Oral, Nightly PRN      TRESIBA FLEXTOUCH 100 UNIT/ML solution pen-injector injection  Generic drug:  insulin degludec   25 Units, Subcutaneous, Daily With Dinner      vitamin b complex capsule capsule   Oral, Daily      VITAMIN D PO   5,000 Units, Oral, Daily         Stop These Medications    metoprolol tartrate 25 MG tablet  Commonly known as:  LOPRESSOR            Discharge Diet:   Diet Instructions     Diet: Consistent Carbohydrate, Cardiac, Renal      Discharge Diet:   Consistent Carbohydrate  Cardiac  Renal             Activity at Discharge:   Activity Instructions     Activity as Tolerated            Discharge disposition: home    Follow-up Appointments  Future Appointments   Date Time  Provider Department [place]   7/24/2020 10:20 AM Michaela Hylton MD [facility] None     Additional Instructions for the Follow-ups that You Need to Schedule     Discharge Follow-up with PCP   As directed       Currently Documented PCP:    Vishal Adrian Jr., MD    PCP Phone Number:    [phone]     Follow Up Details:  1 week with pulse ox numbers           He also needs to follow-up with his nephrologist who helps to manage his kidney function, volume overload and hypertension.       Michaela Hylton MD, [facility]  03/02/20  3:16 PM    Time: Discharge 40 min

## 2020-03-02 NOTE — PLAN OF CARE
Problem: Patient Care Overview  Goal: Plan of Care Review  Outcome: Ongoing (interventions implemented as appropriate)  Flowsheets  Taken 3/1/2020 2054 by Patricia Roman, RN  Plan of Care Reviewed With: patient;spouse  Taken 3/1/2020 1728 by Laura Alejo, RN  Outcome Summary: Pt O2 has been off today with stats ranging from mid 80's to mid 90's depending on patient's position and/or sleeping.  Pt upset today thinking he was getting discharged.  Education done with patient and wife about the importance of getting his BP at an acceptable level.  Education done on the increase of medication and changes at home that need to be done to help with BP.  Both verbalized understanding and stated he does that already.  Wife bathed patient today.  Pt stable and will continue to monitor.

## 2020-03-02 NOTE — PROGRESS NOTES
"                                                                                        [facility] Follow up Note        PATIENT IDENTIFICATION     Name: Dilip Verma  Age: 69 y.o.  Sex: male  :  1950  MRN: [id number]       CHIEF COMPLIANTS / REASON FOR FOLLOW UP          ckd        Subjective:      2020, patient seen and examined, he has no new complaints, overall feeling much better.  Review of Systems:          Constitutional: No fever, no chills, no lethargy, no weakness.  HEENT:  No headache, otalgia, itchy eyes, nasal discharge or sore throat.  Cardiac:  No chest pain, dyspnea, orthopnea or PND.  Chest:  No cough, phlegm or wheezing.  Abdomen:  No abdominal pain, nausea or vomiting.  Neuro:  No focal weakness, abnormal movements or seizure-like activity.  :   No hematuria, no pyuria, no dysuria, no flank pain.  Extremities:  No  joint pains.  ROS was otherwise negative except as mentioned in the [place].        OBJECTIVE                                                                        Exam:  /83 (BP Location: Left arm, Patient Position: Sitting)   Pulse 79   Temp 97.8 °F (36.6 °C) (Oral)   Resp 18   Ht 175 cm (68.9\")   Wt 88 kg (194 lb 1.6 oz)   SpO2 98%   BMI 28.75 kg/m²   Intake/Output last 3 shifts:  I/O last 3 completed shifts:  In: 550 [P.O.:550]  Out: 3925 [Urine:3925]  Intake/Output this shift:  I/O this shift:  In: 480 [P.O.:480]  Out: 400 [Urine:400]    General Appearance:  Alert, cooperative, no distress, appears stated age  Head:  Normocephalic, without obvious abnormality, atraumatic  Eyes:  Sclerae anicteric, EOM's intact     Neck:  Supple,  no adenopathy;      Lungs:   Clear to auscultation bilaterally, respirations unlabored  Heart:  Regular rate and rhythm, S1 and S2 normal, no  rub   or gallop  Abdomen:  Soft, nontender,    no masses, no hepatomegaly, no splenomegaly  Extremities:  Extremities normal, no edema  Neurologic:   Alert and " oriented, no focal deficits    Scheduled Meds:    aspirin 81 mg Oral Daily   carvedilol 6.25 mg Oral BID With Meals   enoxaparin 40 mg Subcutaneous Q24H   furosemide 40 mg Intravenous BID   gabapentin 300 mg Oral Nightly   hydrALAZINE 50 mg Oral TID   insulin glargine 25 Units Subcutaneous Nightly   insulin lispro 0-9 Units Subcutaneous 4x Daily With Meals & Nightly   insulin lispro 4 Units Subcutaneous Daily Before Lunch   insulin lispro 7 Units Subcutaneous Daily Before Supper   ipratropium-albuterol 3 mL Nebulization Q8H - RT   melatonin 3 mg Oral Nightly   spironolactone 25 mg Oral Daily   tamsulosin 0.4 mg Oral BID     Continuous Infusions:   PRN Meds:bisacodyl  •  dextrose  •  dextrose  •  glucagon (human recombinant)  •  nitroglycerin  •  pramipexole  •  traZODone         Data Review:                                                                           CBC:   Results from last 7 days   Lab Units 02/25/20  1643   WBC 10*3/mm3 7.58   RBC 10*6/mm3 4.79     BMP:   Results from last 7 days   Lab Units 03/02/20  0654   GLUCOSE mg/dL 190*   CO2 mmol/L 29.2*   BUN mg/dL 32*   CREATININE mg/dL 1.25   CALCIUM mg/dL 9.4     ABGs:       Invalid input(s): PO2       Imaging:                                                                                          ASSESSMENT:                                                                                Diabetes mellitus, type II (CMS/HCC)    CAD (coronary artery disease)    Chronic diastolic CHF (congestive heart failure) (CMS/HCC)    CSA (central sleep apnea)    Labile hypertension    Hypoxia        Hypertension:review metanephrines,normetanephrine,kenzie,rening levels done for secondary hypertension .  History of poorly controlled blood pressure for more than one year.     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     Cad:s/p cabg x 3.7/18/19.     S/p nstemi.     · Chf/pulmonary hypertension:Left ventricular  systolic function is normal. Calculated EF = 55.0%.  · Left ventricular diastolic dysfunction is noted (grade III w/high LAP) consistent with fixed restricive pattern  · Normal right ventricular cavity size and systolic function noted.  · Left atrial cavity size is mildly dilated.  Estimated right ventricular systolic pressure from tricuspid regurgitation is markedly elevated (>55 mmHg). Calculated right ventricular systolic pressure from tricuspid regurgitation is 68 mmHg.     Elevated iPth:214 7/22/19.         PLAN:                                                                              Volume is stable, renal function is stable, presently on Lasix 40 mg IV every 12 hours and Aldactone 25 mg daily, will continue, can be switched to Lasix 40 mg oral route twice daily on discharge  Remains in negative balance  Electrolytes are acceptable  Blood pressure is elevated, cardiology following with plans to increase Coreg and hydralazine  Follow renal function,volume  Outpatient follow-up as already scheduled once discharged from the hospital  Overall stable from renal standpoint  Thanks.           Ronald Moura MD  [facility]  3/2/2020  9:39 AM

## 2020-03-03 ENCOUNTER — READMISSION MANAGEMENT (OUTPATIENT)
Dept: CALL CENTER | Facility: HOSPITAL | Age: 70
End: 2020-03-03

## 2020-03-03 NOTE — PROGRESS NOTES
Case Management Discharge Note      Final Note: Pt was dc'd home    Provided Post Acute Provider List?: Yes  Post Acute Provider List: DME Supplier  Provided Post Acute Provider Quality & Resource List?: N/A  Delivered To: Patient  Method of Delivery: In person    Destination      No service has been selected for the patient.      Durable Medical Equipment      No service has been selected for the patient.      Dialysis/Infusion      No service has been selected for the patient.      Home Medical Care      No service has been selected for the patient.      Therapy      No service has been selected for the patient.      Community Resources      No service has been selected for the patient.        Transportation Services  Private: Car    Final Discharge Disposition Code: 01 - home or self-care

## 2020-03-03 NOTE — OUTREACH NOTE
Prep Survey      Responses   Facility patient discharged from?  [place]   Is patient eligible?  Yes   Discharge diagnosis  Hypertensive urgency       Does the patient have one of the following disease processes/diagnoses(primary or secondary)?  CHF   Does the patient have Home health ordered?  No   Is there a DME ordered?  No   Medication alerts for this patient  Aldactone    General alerts for this patient  Aldactone    Prep survey completed?  Yes          Rhoda Mitchell RN

## 2020-03-04 ENCOUNTER — READMISSION MANAGEMENT (OUTPATIENT)
Dept: CALL CENTER | Facility: HOSPITAL | Age: 70
End: 2020-03-04

## 2020-03-04 NOTE — OUTREACH NOTE
CHF Week 1 Survey      Responses   [facility] patient discharged from?  [place]   Does the patient have one of the following disease processes/diagnoses(primary or secondary)?  CHF   Is there a successful TCM telephone encounter documented?  No   CHF Week 1 attempt successful?  Yes   Call start time  1417   Call end time  1427   Discharge diagnosis  Hypertensive urgency       Person spoke with today (if not patient) and relationship  Beny-spouse   Meds reviewed with patient/caregiver?  Yes   Is the patient having any side effects they believe may be caused by any medication additions or changes?  No   Does the patient have all medications ordered at discharge?  Yes   Is the patient taking all medications as directed (includes completed medication regime)?  Yes   Does the patient have a primary care provider?   Yes   Does the patient have an appointment with their PCP within 7 days of discharge?  N/A   Comments regarding PCP  Not scheduled at time of call. Encouraged pt to call PCP.   Has the patient kept scheduled appointments due by today?  N/A   Psychosocial issues?  No   Did the patient receive a copy of their discharge instructions?  Yes   Nursing interventions  Reviewed instructions with patient, Educated on MyChart   What is the patient's perception of their health status since discharge?  Improving   Nursing interventions  Nurse provided patient education   Is the patient weighing daily?  Yes   Does the patient have scales?  Yes   Daily weight interventions  Education provided on importance of daily weight   Is the patient able to teach back Heart Failure diet management?  Yes   Is the patient able to teach back Heart Failure Zones?  Yes [Green zone]   Is the patient able to teach back signs and symptoms of worsening condition? (i.e. weight gain, shortness of air, etc.)  Yes   If the patient is a current smoker, are they able to teach back resources for cessation?  -- [Nonsmoker]   Is the  patient/caregiver able to teach back the hierarchy of who to call/visit for symptoms/problems? PCP, Specialist, Home health nurse, Urgent Care, ED, 911  Yes    CHF Week 1 call completed?  Yes          Livia Suarez RN

## 2020-03-06 ENCOUNTER — OFFICE VISIT (OUTPATIENT)
Dept: FAMILY MEDICINE CLINIC | Facility: CLINIC | Age: 70
End: 2020-03-06

## 2020-03-06 VITALS
WEIGHT: 194.8 LBS | BODY MASS INDEX: 28.85 KG/M2 | TEMPERATURE: 98.4 F | DIASTOLIC BLOOD PRESSURE: 74 MMHG | HEIGHT: 69 IN | OXYGEN SATURATION: 98 % | HEART RATE: 70 BPM | SYSTOLIC BLOOD PRESSURE: 144 MMHG

## 2020-03-06 DIAGNOSIS — Z86.73 HX OF COMPLETED STROKE: ICD-10-CM

## 2020-03-06 DIAGNOSIS — I50.32 CHRONIC DIASTOLIC CHF (CONGESTIVE HEART FAILURE) (HCC): Primary | ICD-10-CM

## 2020-03-06 DIAGNOSIS — R26.89 LOSS OF BALANCE: ICD-10-CM

## 2020-03-06 DIAGNOSIS — E55.9 VITAMIN D DEFICIENCY: ICD-10-CM

## 2020-03-06 DIAGNOSIS — I10 HYPERTENSION, ESSENTIAL: ICD-10-CM

## 2020-03-06 LAB
25(OH)D3 SERPL-MCNC: 39.6 NG/ML (ref 30–100)
ANION GAP SERPL CALCULATED.3IONS-SCNC: 10.9 MMOL/L (ref 5–15)
BUN BLD-MCNC: 32 MG/DL (ref 8–23)
BUN/CREAT SERPL: 20.9 (ref 7–25)
CALCIUM SPEC-SCNC: 9.7 MG/DL (ref 8.6–10.5)
CHLORIDE SERPL-SCNC: 99 MMOL/L (ref 98–107)
CO2 SERPL-SCNC: 30.1 MMOL/L (ref 22–29)
CREAT BLD-MCNC: 1.53 MG/DL (ref 0.76–1.27)
GFR SERPL CREATININE-BSD FRML MDRD: 45 ML/MIN/1.73
GLUCOSE BLD-MCNC: 180 MG/DL (ref 65–99)
NT-PROBNP SERPL-MCNC: 1319 PG/ML (ref 5–900)
POTASSIUM BLD-SCNC: 4.7 MMOL/L (ref 3.5–5.2)
SODIUM BLD-SCNC: 140 MMOL/L (ref 136–145)

## 2020-03-06 PROCEDURE — 83880 ASSAY OF NATRIURETIC PEPTIDE: CPT | Performed by: INTERNAL MEDICINE

## 2020-03-06 PROCEDURE — 82306 VITAMIN D 25 HYDROXY: CPT | Performed by: INTERNAL MEDICINE

## 2020-03-06 PROCEDURE — 80048 BASIC METABOLIC PNL TOTAL CA: CPT | Performed by: INTERNAL MEDICINE

## 2020-03-06 PROCEDURE — 36415 COLL VENOUS BLD VENIPUNCTURE: CPT | Performed by: INTERNAL MEDICINE

## 2020-03-06 PROCEDURE — 99214 OFFICE O/P EST MOD 30 MIN: CPT | Performed by: INTERNAL MEDICINE

## 2020-03-06 NOTE — PROGRESS NOTES
Subjective   Dilip Verma is a 69 y.o. male.   Body mass index is 28.77 kg/m².  Recent hospitalization at [place] with CHF.  Doing fairly well now.  History of Present Illness blood pressures been somewhat variable was recently put on Coreg 12.5 mg twice a day feeling somewhat tired on that we will have patient continue his blood pressure medicines he is 144/74 does see nephrologist later this week follow-up with cardiologist well.  Blood pressures continue to be high will consider adding another agent amlodipine did cause significant swelling in the past  Has bipap.  Readings do okay without significant swelling at this point will monitor renal status as well.  Patient also has tenderness chairlift.  He has an old stroke was effective balance is neuropathy lower extremities has CHF which produces weakness neuropathy also causing instability and prone to falling.  Would be justification to do total to maintain use of house activities of daily living and improved safety with minimizing fall risk.  History of low vitamin D as well get updated value  Drug allergies are Ativan causing debility ACE inhibitor is causing angioedema amlodipine swelling clonidine undefined losartan elise family history is positive for depression cancer arrhythmia heart disease kidney disease thyroid disease alcohol abuse lung disease glaucoma stroke oedema minoxidil confusion and tetracycline undefined former smoker quit in 2000  Review of Systems   All other systems reviewed and are negative.      Objective   Vitals:    03/06/20 0811   BP: 144/74   Pulse: 70   Temp: 98.4 °F (36.9 °C)   SpO2: 98%   Weight: 88.4 kg (194 lb 12.8 oz)     Physical Exam   Constitutional: He appears well-developed and well-nourished.   HENT:   Head: Normocephalic and atraumatic.   Eyes: Pupils are equal, round, and reactive to light. Conjunctivae are normal.   Cardiovascular: Normal rate, regular rhythm and normal heart sounds.   Pulmonary/Chest: Effort normal  and breath sounds normal.   Abdominal: Soft. Bowel sounds are normal.   Neurological: He is alert.   Unremarkable gait and station   Skin: Skin is warm and dry.   Nursing note and vitals reviewed.      Lab Results   Component Value Date    INR 1.13 (H) 02/25/2020    INR 1.48 (H) 07/19/2019    INR 1.63 (H) 07/18/2019       Procedures    Assessment/Plan   1.  CHF recent exacerbation continue present medicine get update electrolytes  Awaiting BNP as well.  2.  Hypertension fair control recent Coreg changed we will not further change at this point some tiredness with that consider adding yet another agent we will see nephrology in a week in cardiology and follow-up anticipate follow-up with him in 1 month's time as well.  Or 6 weeks    3.  Loss of balance secondary no CVA plan stair lift facilitate use of the home    4.  CVA    5.  Vitamin D deficiency get updated values with follow-up contingent on results.    Much of this encounter note is an electronic transcription/translation of spoken language to printed text.  The electronic translation of spoken language may permit erroneous, or at times, nonsensical words or phrases to be inadvertently transcribed.  Although I have reviewed the note for such errors, some may still exist. If there are questions or for further clarification, please contact me.    Balance    cva neuropathy

## 2020-03-10 ENCOUNTER — READMISSION MANAGEMENT (OUTPATIENT)
Dept: CALL CENTER | Facility: HOSPITAL | Age: 70
End: 2020-03-10

## 2020-03-10 ENCOUNTER — TELEPHONE (OUTPATIENT)
Dept: FAMILY MEDICINE CLINIC | Facility: CLINIC | Age: 70
End: 2020-03-10

## 2020-03-10 NOTE — OUTREACH NOTE
CHF Week 2 Survey      Responses   [facility] patient discharged from?  [place]   Does the patient have one of the following disease processes/diagnoses(primary or secondary)?  CHF   Week 2 attempt successful?  No   Unsuccessful attempts  Attempt 1          Ricarda Freire RN

## 2020-03-10 NOTE — TELEPHONE ENCOUNTER
Patient called in requesting for his LAB results be faxed to a kidney specialist today at     [phone]  Dr.Jai Rosado

## 2020-03-12 ENCOUNTER — READMISSION MANAGEMENT (OUTPATIENT)
Dept: CALL CENTER | Facility: HOSPITAL | Age: 70
End: 2020-03-12

## 2020-03-12 NOTE — OUTREACH NOTE
CHF Week 2 Survey      Responses   [facility] patient discharged from?  [place]   Does the patient have one of the following disease processes/diagnoses(primary or secondary)?  CHF   Week 2 attempt successful?  Yes   Call start time  1629   Call end time  1644   Discharge diagnosis  Hypertensive urgency       Person spoke with today (if not patient) and relationship  Beny-spouse   Meds reviewed with patient/caregiver?  Yes   Is the patient having any side effects they believe may be caused by any medication additions or changes?  No   Does the patient have all medications ordered at discharge?  Yes   Is the patient taking all medications as directed (includes completed medication regime)?  Yes   Psychosocial issues?  No   Comments  wife states using BiPap machine more   Did the patient receive a copy of their discharge instructions?  Yes   Nursing interventions  Reviewed instructions with patient   What is the patient's perception of their health status since discharge?  Improving   Nursing interventions  Nurse provided patient education   Is the patient weighing daily?  Yes   Does the patient have scales?  Yes   Is the patient able to teach back Heart Failure diet management?  Yes   Is the patient able to teach back Heart Failure Zones?  Yes   Is the patient able to teach back signs and symptoms of worsening condition? (i.e. weight gain, shortness of air, etc.)  Yes   Is the patient/caregiver able to teach back the hierarchy of who to call/visit for symptoms/problems? PCP, Specialist, Home health nurse, Urgent Care, ED, 911  Yes   Additional teach back comments  wife states decreasing Lasix has been discussed, will be talking to nephrology next week   CHF Week 2 call completed?  Yes          Jaycee Dutton RN

## 2020-03-19 ENCOUNTER — READMISSION MANAGEMENT (OUTPATIENT)
Dept: CALL CENTER | Facility: HOSPITAL | Age: 70
End: 2020-03-19

## 2020-03-19 NOTE — OUTREACH NOTE
CHF Week 3 Survey      Responses   [facility] patient discharged from?  [place]   Does the patient have one of the following disease processes/diagnoses(primary or secondary)?  CHF   Week 3 attempt successful?  Yes   Call start time  1658   Call end time  1704   Discharge diagnosis  Hypertensive urgency       Medication alerts for this patient  Aldactone    Meds reviewed with patient/caregiver?  Yes   Is the patient having any side effects they believe may be caused by any medication additions or changes?  No   Does the patient have all medications ordered at discharge?  Yes   Is the patient taking all medications as directed (includes completed medication regime)?  Yes   Does the patient have a primary care provider?   Yes   Does the patient have an appointment with their PCP within 7 days of discharge?  Yes   Has the patient kept scheduled appointments due by today?  Yes   Has home health visited the patient within 72 hours of discharge?  N/A   Psychosocial issues?  No   Did the patient receive a copy of their discharge instructions?  Yes   Nursing interventions  Reviewed instructions with patient   What is the patient's perception of their health status since discharge?  Improving   Nursing interventions  Nurse provided patient education   Is the patient weighing daily?  Yes   Does the patient have scales?  Yes   Daily weight interventions  Education provided on importance of daily weight   Is the patient able to teach back Heart Failure diet management?  Yes   Is the patient able to teach back Heart Failure Zones?  Yes   Is the patient able to teach back signs and symptoms of worsening condition? (i.e. weight gain, shortness of air, etc.)  Yes   Is the patient/caregiver able to teach back the hierarchy of who to call/visit for symptoms/problems? PCP, Specialist, Home health nurse, Urgent Care, ED, 911  Yes   Additional teach back comments  Dr Pace says he has gained a few pounds and meds were  adjusted to compensate.   CHF Week 3 call completed?  Yes          Neida Marti RN

## 2020-03-27 ENCOUNTER — READMISSION MANAGEMENT (OUTPATIENT)
Dept: CALL CENTER | Facility: HOSPITAL | Age: 70
End: 2020-03-27

## 2020-03-27 NOTE — OUTREACH NOTE
CHF Week 4 Survey      Responses   [facility] patient discharged from?  [place]   Does the patient have one of the following disease processes/diagnoses(primary or secondary)?  CHF   Week 4 attempt successful?  Yes   Call start time  1053   Call end time  1101   List who call center can speak with  JANAE REILLY   Person spoke with today (if not patient) and relationship  JANAELESLYE REILLY- WIFE   Meds reviewed with patient/caregiver?  Yes   Is the patient having any side effects they believe may be caused by any medication additions or changes?  No   Is the patient taking all medications as directed (includes completed medication regime)?  Yes   Has the patient kept scheduled appointments due by today?  Yes   What is the patient's perception of their health status since discharge?  Same [WIFE STATES PATIENT'S WEIGHT IS UP ABOUT 6LBS, BUT SHE HAS A CALL OUT TO PATIENT'S NEPROLOGIST ABOUT THIS, AND WAITING FOR A RETURN CALL. ]   Nursing interventions  Nurse provided patient education   Is the patient weighing daily?  Yes   Does the patient have scales?  Yes   Daily weight interventions  Education provided on importance of daily weight   Is the patient able to teach back Heart Failure diet management?  Yes   Is the patient able to teach back Heart Failure Zones?  Yes   Is the patient able to teach back signs and symptoms of worsening condition? (i.e. weight gain, shortness of air, etc.)  Yes   Week 4 Call Completed?  Yes   Would the patient like one additional call?  No   Graduated  Yes   Did the patient feel the follow up calls were helpful during their recovery period?  Yes   Was the number of calls appropriate?  Yes          Tita Martinez LPN

## 2020-03-30 RX ORDER — SILDENAFIL CITRATE 20 MG/1
TABLET ORAL
Qty: 20 TABLET | Refills: 0 | Status: SHIPPED | OUTPATIENT
Start: 2020-03-30 | End: 2020-05-04

## 2020-03-30 NOTE — TELEPHONE ENCOUNTER
Check with patient to make sure he did check with his cardiologist to make sure there are no qualms about this medication

## 2020-05-04 RX ORDER — SILDENAFIL CITRATE 20 MG/1
TABLET ORAL
Qty: 20 TABLET | Refills: 0 | Status: SHIPPED | OUTPATIENT
Start: 2020-05-04 | End: 2020-05-26

## 2020-05-26 RX ORDER — SILDENAFIL CITRATE 20 MG/1
TABLET ORAL
Qty: 20 TABLET | Refills: 0 | Status: SHIPPED | OUTPATIENT
Start: 2020-05-26 | End: 2020-07-06

## 2020-06-01 ENCOUNTER — TELEPHONE (OUTPATIENT)
Dept: FAMILY MEDICINE CLINIC | Facility: CLINIC | Age: 70
End: 2020-06-01

## 2020-06-01 NOTE — TELEPHONE ENCOUNTER
Check with patient see if he is restart his medicine also was blood pressures are doing.  Will probably need to tweak his medicines if 1 or more causing side effects.

## 2020-06-01 NOTE — TELEPHONE ENCOUNTER
"NP on call 5/29/2020 call from patient's wife at 9:35 pm, stated patient's /115, asymptomatic, sees cardiology, nephrology for labile HTN, has pulm appt this week, states not taking am doses of coreg and hydralazine d/t making him \"not feel right\" patient's wife stated he has been taking his sildenafil prescribed for ED for HTN. Advised ER for elevated BP and to take BP meds as prescribed, advised possible rebound HTN with sildenafil. May need f/u to address BP and meds. Please call for f/u and see what BP ahs been running.   "

## 2020-06-03 ENCOUNTER — TELEPHONE (OUTPATIENT)
Dept: FAMILY MEDICINE CLINIC | Facility: CLINIC | Age: 70
End: 2020-06-03

## 2020-06-03 NOTE — TELEPHONE ENCOUNTER
BP checked during PT is:    187/91, rechecked 5 min  Later is 178/92.    Pt is asymptomatic, says he is taking his medication.    [phone] - [place]     She is sending patient home.  Home number is [phone]

## 2020-06-03 NOTE — TELEPHONE ENCOUNTER
PATIENTS WIFE CALLED AND STATED THAT THE THE PATIENTS BLOOD PRESSURE WAS ELEVATED AT PHYSICAL THERAPY BUT WHEN HE ARRIVED HOME IT /62. THE WIFE STATES THAT THE PATIENT HAD AN APPOINTMENT WITH A PULMONOLOGIST YESTERDAY. THE PATIENT WOULD LIKE TO KNOW THE BEST THING TO DO ABOUT HIS BLOOD PRESSURE.    JANAE CALL BACK [phone]

## 2020-06-04 RX ORDER — HYDRALAZINE HYDROCHLORIDE 25 MG/1
25 TABLET, FILM COATED ORAL 3 TIMES DAILY
Qty: 90 TABLET | Refills: 1 | Status: SHIPPED | OUTPATIENT
Start: 2020-06-04 | End: 2020-12-01

## 2020-06-04 NOTE — TELEPHONE ENCOUNTER
With variable blood pressure think it is better served taking his hydralazine 25 mg 3 times daily if he only has if it milligrams tablets should need to change that but he is literally taken just 1 pill a day this got too short half-life for that to be reasonable dosing.

## 2020-06-08 ENCOUNTER — TELEPHONE (OUTPATIENT)
Dept: FAMILY MEDICINE CLINIC | Facility: CLINIC | Age: 70
End: 2020-06-08

## 2020-06-08 NOTE — TELEPHONE ENCOUNTER
Check with patient he was supposed to see specialist very soon from last contact with him we do need him better controlled on his blood pressure medicines I need to know what he is taking now.  I will need to do more for blood pressure control to facilitate him actually getting physical therapy done

## 2020-06-08 NOTE — TELEPHONE ENCOUNTER
Karina called in from [facility], she stated that each time the patient comes in for physical therapy he's not in the appropriate condition to do exercise due to his Bp being high 188/102. Karina wants to know if the doctor has guidelines for her to go by.    Please call back to gaviota @ [phone]

## 2020-07-06 RX ORDER — SILDENAFIL CITRATE 20 MG/1
TABLET ORAL
Qty: 20 TABLET | Refills: 0 | Status: SHIPPED | OUTPATIENT
Start: 2020-07-06 | End: 2020-07-10

## 2020-07-10 RX ORDER — SILDENAFIL CITRATE 20 MG/1
TABLET ORAL
Qty: 20 TABLET | Refills: 0 | Status: SHIPPED | OUTPATIENT
Start: 2020-07-10 | End: 2020-10-23

## 2020-07-21 ENCOUNTER — TRANSCRIBE ORDERS (OUTPATIENT)
Dept: SLEEP MEDICINE | Facility: HOSPITAL | Age: 70
End: 2020-07-21

## 2020-07-21 DIAGNOSIS — I50.32 CHRONIC DIASTOLIC CHF (CONGESTIVE HEART FAILURE) (HCC): Primary | ICD-10-CM

## 2020-07-21 DIAGNOSIS — Z01.818 OTHER SPECIFIED PRE-OPERATIVE EXAMINATION: Primary | ICD-10-CM

## 2020-07-21 DIAGNOSIS — G47.30 SLEEP APNEA, UNSPECIFIED TYPE: ICD-10-CM

## 2020-07-24 ENCOUNTER — LAB (OUTPATIENT)
Dept: LAB | Facility: HOSPITAL | Age: 70
End: 2020-07-24

## 2020-07-24 ENCOUNTER — OFFICE VISIT (OUTPATIENT)
Dept: CARDIOLOGY | Facility: CLINIC | Age: 70
End: 2020-07-24

## 2020-07-24 VITALS
DIASTOLIC BLOOD PRESSURE: 83 MMHG | SYSTOLIC BLOOD PRESSURE: 146 MMHG | BODY MASS INDEX: 29.62 KG/M2 | WEIGHT: 200 LBS | HEART RATE: 66 BPM | HEIGHT: 69 IN

## 2020-07-24 DIAGNOSIS — E78.5 HYPERLIPIDEMIA, UNSPECIFIED HYPERLIPIDEMIA TYPE: ICD-10-CM

## 2020-07-24 DIAGNOSIS — Z01.818 OTHER SPECIFIED PRE-OPERATIVE EXAMINATION: ICD-10-CM

## 2020-07-24 DIAGNOSIS — E11.22 TYPE 2 DIABETES MELLITUS WITH STAGE 3 CHRONIC KIDNEY DISEASE, WITH LONG-TERM CURRENT USE OF INSULIN (HCC): ICD-10-CM

## 2020-07-24 DIAGNOSIS — G47.31 CSA (CENTRAL SLEEP APNEA): ICD-10-CM

## 2020-07-24 DIAGNOSIS — N18.30 TYPE 2 DIABETES MELLITUS WITH STAGE 3 CHRONIC KIDNEY DISEASE, WITH LONG-TERM CURRENT USE OF INSULIN (HCC): ICD-10-CM

## 2020-07-24 DIAGNOSIS — Z95.1 HX OF CABG: ICD-10-CM

## 2020-07-24 DIAGNOSIS — I25.10 CORONARY ARTERY DISEASE INVOLVING NATIVE CORONARY ARTERY OF NATIVE HEART WITHOUT ANGINA PECTORIS: Primary | ICD-10-CM

## 2020-07-24 DIAGNOSIS — Z79.4 TYPE 2 DIABETES MELLITUS WITH STAGE 3 CHRONIC KIDNEY DISEASE, WITH LONG-TERM CURRENT USE OF INSULIN (HCC): ICD-10-CM

## 2020-07-24 PROBLEM — R09.89 LABILE HYPERTENSION: Status: RESOLVED | Noted: 2020-02-26 | Resolved: 2020-07-24

## 2020-07-24 PROBLEM — I50.32 CHRONIC DIASTOLIC CHF (CONGESTIVE HEART FAILURE) (HCC): Status: RESOLVED | Noted: 2019-09-11 | Resolved: 2020-07-24

## 2020-07-24 PROCEDURE — C9803 HOPD COVID-19 SPEC COLLECT: HCPCS

## 2020-07-24 PROCEDURE — 99442 PR PHYS/QHP TELEPHONE EVALUATION 11-20 MIN: CPT | Performed by: INTERNAL MEDICINE

## 2020-07-24 PROCEDURE — U0004 COV-19 TEST NON-CDC HGH THRU: HCPCS

## 2020-07-26 LAB
REF LAB TEST METHOD: NORMAL
SARS-COV-2 RNA RESP QL NAA+PROBE: NOT DETECTED

## 2020-07-27 ENCOUNTER — HOSPITAL ENCOUNTER (OUTPATIENT)
Dept: SLEEP MEDICINE | Facility: HOSPITAL | Age: 70
Discharge: HOME | End: 2020-07-27
Admitting: INTERNAL MEDICINE

## 2020-07-27 DIAGNOSIS — G47.30 SLEEP APNEA, UNSPECIFIED TYPE: ICD-10-CM

## 2020-07-27 DIAGNOSIS — I50.32 CHRONIC DIASTOLIC CHF (CONGESTIVE HEART FAILURE) (HCC): ICD-10-CM

## 2020-07-27 PROCEDURE — 95811 POLYSOM 6/>YRS CPAP 4/> PARM: CPT

## 2020-08-04 ENCOUNTER — TELEPHONE (OUTPATIENT)
Dept: SLEEP MEDICINE | Facility: HOSPITAL | Age: 70
End: 2020-08-04

## 2020-08-04 NOTE — TELEPHONE ENCOUNTER
Spoke with pts wife regarding results and advised f/u @ [facility] for compliance. Faxed order for set up to [facility].

## 2020-09-01 ENCOUNTER — TELEPHONE (OUTPATIENT)
Dept: CARDIOLOGY | Facility: CLINIC | Age: 70
End: 2020-09-01

## 2020-09-01 NOTE — TELEPHONE ENCOUNTER
Patient called and would like to get clearance for a hand surgery once he gets clearance. Dr Lua with [facility] is doing it. I will get a fax number once the letter is ready.    Office number [phone].

## 2020-09-23 NOTE — TELEPHONE ENCOUNTER
Pt wife calls today to say that there was not been anything received by the hand specialist, she would like for that to be faxed again and for you to call her when it has been completed    Fax [phone]

## 2020-10-12 DIAGNOSIS — Z51.81 MEDICATION MONITORING ENCOUNTER: Primary | ICD-10-CM

## 2020-10-23 RX ORDER — SILDENAFIL CITRATE 20 MG/1
TABLET ORAL
Qty: 20 TABLET | Refills: 0 | Status: SHIPPED | OUTPATIENT
Start: 2020-10-23 | End: 2020-11-16 | Stop reason: SDUPTHER

## 2020-11-16 RX ORDER — SILDENAFIL CITRATE 20 MG/1
TABLET ORAL
Qty: 20 TABLET | Refills: 0 | Status: SHIPPED | OUTPATIENT
Start: 2020-11-16 | End: 2020-12-07 | Stop reason: HOSPADM

## 2020-11-16 RX ORDER — PRAMIPEXOLE DIHYDROCHLORIDE 0.25 MG/1
0.25 TABLET ORAL 2 TIMES DAILY PRN
Qty: 60 TABLET | Refills: 3 | Status: SHIPPED | OUTPATIENT
Start: 2020-11-16 | End: 2020-12-07 | Stop reason: HOSPADM

## 2020-11-16 NOTE — TELEPHONE ENCOUNTER
See how patient is taking his Mirapex and we will send that inappropriately if he is taking 2 tablets I 0.25 mg 2 each 0.5 mg a day let me know we will send that in please let me tomorrow so we will get that done remind him he does of me urine drug screen drug contract for the gabapentin as well.

## 2020-11-16 NOTE — TELEPHONE ENCOUNTER
Caller: Dilip Verma    Relationship: Self    Best call back number:[phone]    Medication needed:   Requested Prescriptions     Pending Prescriptions Disp Refills   • sildenafil (REVATIO) 20 MG tablet 20 tablet 0     Sig: TAKE 1 TO 3 TABLETS BY MOUTH ONE HOUR PRIOR TO SEXUAL ACTIVITY. BEGIN WITH 1 TABLET AND MAX 3 TABLETS PER DAY   • pramipexole (MIRAPEX) 0.25 MG tablet        Sig: Take 1 tablet by mouth 2 (Two) Times a Day As Needed (restless leg).   GABAPENTIN         Does the patient have less than a 3 day supply:  [x] Yes  [] No    What is the patient's preferred pharmacy: [id number] - [address] - [phone] [facility] [phone] FX

## 2020-11-25 ENCOUNTER — TRANSCRIBE ORDERS (OUTPATIENT)
Dept: ADMINISTRATIVE | Facility: HOSPITAL | Age: 70
End: 2020-11-25

## 2020-11-25 ENCOUNTER — TELEPHONE (OUTPATIENT)
Dept: FAMILY MEDICINE CLINIC | Facility: CLINIC | Age: 70
End: 2020-11-25

## 2020-11-25 DIAGNOSIS — R59.1 LYMPHADENOPATHY SYNDROME: Primary | ICD-10-CM

## 2020-11-25 NOTE — TELEPHONE ENCOUNTER
PATIENTS WIFE CALLED STATED HIS BLOOD PRESSURE IS RUNNING HIGH 164/99 AND HIGHER.  THIS IS AFTER HE HAS HAD HIS BP MEDICATION.      PATIENTS WIFE STATED THAT HE IS TAKING HIS BP MEDS EVERY TWELVE HOURS.  IT IS STAYING HIGH 6-8 HOURS AFTER TAKING MEDS.    PATIENT HAS AN APPOINTMENT SCHEDULED FOR 12/1 BUT WIFE IS CONCERNED TO GO FOUR DAYS WITHOUT ANY GUIDANCE AS TO WHAT THEY CAN DO TO LOWER HIS BP.  PLEASE CALL TODAY.    [id number] - [place], [place] - [address] - [phone] [facility] [phone] FX    PLEASE ADVISE  [phone]

## 2020-11-25 NOTE — TELEPHONE ENCOUNTER
Call her to try to go ahead and take an extra 20 mg of Lasix to see if this will bring it down.  Let us know if this does not help and follow-up with Dr. Adrian on Monday.

## 2020-11-25 NOTE — TELEPHONE ENCOUNTER
He is currently taking Hydralazine 25mg bid(which seems to wipe him out) and Lasix 40mg 2 bid. He was in hospital in Feb due to hypertension he is also seeing a nephrologist  He cannot use ace inhibitors due to swelling and he had been on Coreg 12.5 bid which he stopped on his own.

## 2020-11-25 NOTE — TELEPHONE ENCOUNTER
Patients Lasix was increased earlier this week by his nephrologist.  Per verbal order of Dr Avila resume the Coreg 12.5mg 1/2 tablet bid and follow up in office as planned

## 2020-12-01 ENCOUNTER — APPOINTMENT (OUTPATIENT)
Dept: GENERAL RADIOLOGY | Facility: HOSPITAL | Age: 70
End: 2020-12-01

## 2020-12-01 ENCOUNTER — APPOINTMENT (OUTPATIENT)
Dept: CT IMAGING | Facility: HOSPITAL | Age: 70
End: 2020-12-01

## 2020-12-01 ENCOUNTER — OFFICE VISIT (OUTPATIENT)
Dept: FAMILY MEDICINE CLINIC | Facility: CLINIC | Age: 70
End: 2020-12-01

## 2020-12-01 ENCOUNTER — HOSPITAL ENCOUNTER (INPATIENT)
Facility: HOSPITAL | Age: 70
LOS: 6 days | Discharge: NURSING, REHAB OR LONG-TERM CARE | End: 2020-12-07
Attending: EMERGENCY MEDICINE | Admitting: ORTHOPAEDIC SURGERY

## 2020-12-01 VITALS
TEMPERATURE: 96.6 F | SYSTOLIC BLOOD PRESSURE: 130 MMHG | BODY MASS INDEX: 31.4 KG/M2 | HEART RATE: 76 BPM | WEIGHT: 212 LBS | DIASTOLIC BLOOD PRESSURE: 78 MMHG | OXYGEN SATURATION: 95 % | HEIGHT: 69 IN

## 2020-12-01 DIAGNOSIS — E11.40 TYPE 2 DIABETES MELLITUS WITH DIABETIC NEUROPATHY, UNSPECIFIED WHETHER LONG TERM INSULIN USE (HCC): ICD-10-CM

## 2020-12-01 DIAGNOSIS — Z51.81 MEDICATION MONITORING ENCOUNTER: ICD-10-CM

## 2020-12-01 DIAGNOSIS — S72.145D CLOSED NONDISPLACED INTERTROCHANTERIC FRACTURE OF LEFT FEMUR WITH ROUTINE HEALING, SUBSEQUENT ENCOUNTER: ICD-10-CM

## 2020-12-01 DIAGNOSIS — I10 HYPERTENSION, ESSENTIAL: Primary | ICD-10-CM

## 2020-12-01 DIAGNOSIS — S50.811A ABRASION OF RIGHT FOREARM, INITIAL ENCOUNTER: ICD-10-CM

## 2020-12-01 DIAGNOSIS — S60.10XA SUBUNGUAL HEMATOMA OF FINGER, INITIAL ENCOUNTER: ICD-10-CM

## 2020-12-01 DIAGNOSIS — R79.89 LOW VITAMIN D LEVEL: ICD-10-CM
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
## 2020-12-01 DIAGNOSIS — R60.0 BILATERAL LEG EDEMA: ICD-10-CM

## 2020-12-01 DIAGNOSIS — S72.145A CLOSED NONDISPLACED INTERTROCHANTERIC FRACTURE OF LEFT FEMUR, INITIAL ENCOUNTER (HCC): Primary | ICD-10-CM

## 2020-12-01 PROBLEM — Z86.73 HISTORY OF STROKE: Status: ACTIVE | Noted: 2020-12-01

## 2020-12-01 PROBLEM — N18.30 CKD (CHRONIC KIDNEY DISEASE) STAGE 3, GFR 30-59 ML/MIN (HCC): Status: ACTIVE | Noted: 2020-12-01

## 2020-12-01 LAB
ALBUMIN SERPL-MCNC: 3.9 G/DL (ref 3.5–5.2)
ALBUMIN/GLOB SERPL: 1.3 G/DL
ALP SERPL-CCNC: 71 U/L (ref 39–117)
ALT SERPL W P-5'-P-CCNC: 20 U/L (ref 1–41)
ANION GAP SERPL CALCULATED.3IONS-SCNC: 9.1 MMOL/L (ref 5–15)
APTT PPP: 31.3 SECONDS (ref 22.7–35.4)
AST SERPL-CCNC: 17 U/L (ref 1–40)
B PARAPERT DNA SPEC QL NAA+PROBE: NOT DETECTED
B PERT DNA SPEC QL NAA+PROBE: NOT DETECTED
BASOPHILS # BLD AUTO: 0.02 10*3/MM3 (ref 0–0.2)
BASOPHILS NFR BLD AUTO: 0.2 % (ref 0–1.5)
BILIRUB SERPL-MCNC: 0.6 MG/DL (ref 0–1.2)
BUN SERPL-MCNC: 29 MG/DL (ref 8–23)
BUN/CREAT SERPL: 23.6 (ref 7–25)
C PNEUM DNA NPH QL NAA+NON-PROBE: NOT DETECTED
CALCIUM SPEC-SCNC: 9.2 MG/DL (ref 8.6–10.5)
CHLORIDE SERPL-SCNC: 99 MMOL/L (ref 98–107)
CO2 SERPL-SCNC: 30.9 MMOL/L (ref 22–29)
CREAT SERPL-MCNC: 1.23 MG/DL (ref 0.76–1.27)
DEPRECATED RDW RBC AUTO: 43.1 FL (ref 37–54)
EOSINOPHIL # BLD AUTO: 0.19 10*3/MM3 (ref 0–0.4)
EOSINOPHIL NFR BLD AUTO: 1.8 % (ref 0.3–6.2)
ERYTHROCYTE [DISTWIDTH] IN BLOOD BY AUTOMATED COUNT: 13 % (ref 12.3–15.4)
FLUAV SUBTYP SPEC NAA+PROBE: NOT DETECTED
FLUBV RNA ISLT QL NAA+PROBE: NOT DETECTED
GFR SERPL CREATININE-BSD FRML MDRD: 58 ML/MIN/1.73
GLOBULIN UR ELPH-MCNC: 3 GM/DL
GLUCOSE BLDC GLUCOMTR-MCNC: 260 MG/DL (ref 70–130)
GLUCOSE SERPL-MCNC: 312 MG/DL (ref 65–99)
HADV DNA SPEC NAA+PROBE: NOT DETECTED
HCOV 229E RNA SPEC QL NAA+PROBE: NOT DETECTED
HCOV HKU1 RNA SPEC QL NAA+PROBE: NOT DETECTED
HCOV NL63 RNA SPEC QL NAA+PROBE: NOT DETECTED
HCOV OC43 RNA SPEC QL NAA+PROBE: NOT DETECTED
HCT VFR BLD AUTO: 45.7 % (ref 37.5–51)
HGB BLD-MCNC: 15.5 G/DL (ref 13–17.7)
HMPV RNA NPH QL NAA+NON-PROBE: NOT DETECTED
HPIV1 RNA SPEC QL NAA+PROBE: NOT DETECTED
HPIV2 RNA SPEC QL NAA+PROBE: NOT DETECTED
HPIV3 RNA NPH QL NAA+PROBE: NOT DETECTED
HPIV4 P GENE NPH QL NAA+PROBE: NOT DETECTED
IMM GRANULOCYTES # BLD AUTO: 0.04 10*3/MM3 (ref 0–0.05)
IMM GRANULOCYTES NFR BLD AUTO: 0.4 % (ref 0–0.5)
INR PPP: 1.06 (ref 0.9–1.1)
LYMPHOCYTES # BLD AUTO: 1.01 10*3/MM3 (ref 0.7–3.1)
LYMPHOCYTES NFR BLD AUTO: 9.7 % (ref 19.6–45.3)
M PNEUMO IGG SER IA-ACNC: NOT DETECTED
MCH RBC QN AUTO: 30.9 PG (ref 26.6–33)
MCHC RBC AUTO-ENTMCNC: 33.9 G/DL (ref 31.5–35.7)
MCV RBC AUTO: 91.2 FL (ref 79–97)
MONOCYTES # BLD AUTO: 0.54 10*3/MM3 (ref 0.1–0.9)
MONOCYTES NFR BLD AUTO: 5.2 % (ref 5–12)
NEUTROPHILS NFR BLD AUTO: 8.56 10*3/MM3 (ref 1.7–7)
NEUTROPHILS NFR BLD AUTO: 82.7 % (ref 42.7–76)
NRBC BLD AUTO-RTO: 0 /100 WBC (ref 0–0.2)
PLATELET # BLD AUTO: 197 10*3/MM3 (ref 140–450)
PMV BLD AUTO: 10.3 FL (ref 6–12)
POTASSIUM SERPL-SCNC: 4.5 MMOL/L (ref 3.5–5.2)
PROT SERPL-MCNC: 6.9 G/DL (ref 6–8.5)
PROTHROMBIN TIME: 13.7 SECONDS (ref 11.7–14.2)
QT INTERVAL: 397 MS
RBC # BLD AUTO: 5.01 10*6/MM3 (ref 4.14–5.8)
RHINOVIRUS RNA SPEC NAA+PROBE: NOT DETECTED
RSV RNA NPH QL NAA+NON-PROBE: NOT DETECTED
SARS-COV-2 RNA NPH QL NAA+NON-PROBE: NOT DETECTED
SODIUM SERPL-SCNC: 139 MMOL/L (ref 136–145)
WBC # BLD AUTO: 10.36 10*3/MM3 (ref 3.4–10.8)

## 2020-12-01 PROCEDURE — 70450 CT HEAD/BRAIN W/O DYE: CPT

## 2020-12-01 PROCEDURE — 85730 THROMBOPLASTIN TIME PARTIAL: CPT | Performed by: EMERGENCY MEDICINE

## 2020-12-01 PROCEDURE — 25010000002 HYDROMORPHONE PER 4 MG: Performed by: EMERGENCY MEDICINE

## 2020-12-01 PROCEDURE — 73502 X-RAY EXAM HIP UNI 2-3 VIEWS: CPT

## 2020-12-01 PROCEDURE — 82962 GLUCOSE BLOOD TEST: CPT

## 2020-12-01 PROCEDURE — 71045 X-RAY EXAM CHEST 1 VIEW: CPT

## 2020-12-01 PROCEDURE — 73130 X-RAY EXAM OF HAND: CPT

## 2020-12-01 PROCEDURE — 99214 OFFICE O/P EST MOD 30 MIN: CPT | Performed by: INTERNAL MEDICINE

## 2020-12-01 PROCEDURE — 25010000002 ONDANSETRON PER 1 MG: Performed by: EMERGENCY MEDICINE

## 2020-12-01 PROCEDURE — 99285 EMERGENCY DEPT VISIT HI MDM: CPT

## 2020-12-01 PROCEDURE — 25010000002 HYDROMORPHONE 1 MG/ML SOLUTION: Performed by: NURSE PRACTITIONER

## 2020-12-01 PROCEDURE — 25010000002 HYDROMORPHONE PER 4 MG: Performed by: NURSE PRACTITIONER

## 2020-12-01 PROCEDURE — 93010 ELECTROCARDIOGRAM REPORT: CPT | Performed by: INTERNAL MEDICINE

## 2020-12-01 PROCEDURE — 0202U NFCT DS 22 TRGT SARS-COV-2: CPT | Performed by: EMERGENCY MEDICINE

## 2020-12-01 PROCEDURE — 80053 COMPREHEN METABOLIC PANEL: CPT | Performed by: EMERGENCY MEDICINE

## 2020-12-01 PROCEDURE — 85610 PROTHROMBIN TIME: CPT | Performed by: EMERGENCY MEDICINE

## 2020-12-01 PROCEDURE — 93005 ELECTROCARDIOGRAM TRACING: CPT | Performed by: NURSE PRACTITIONER

## 2020-12-01 PROCEDURE — 0H9QXZZ DRAINAGE OF FINGER NAIL, EXTERNAL APPROACH: ICD-10-PCS | Performed by: EMERGENCY MEDICINE

## 2020-12-01 PROCEDURE — 85025 COMPLETE CBC W/AUTO DIFF WBC: CPT | Performed by: EMERGENCY MEDICINE

## 2020-12-01 PROCEDURE — 25010000002 MORPHINE PER 10 MG: Performed by: EMERGENCY MEDICINE

## 2020-12-01 PROCEDURE — 73700 CT LOWER EXTREMITY W/O DYE: CPT

## 2020-12-01 RX ORDER — HYDROCODONE BITARTRATE AND ACETAMINOPHEN 5; 325 MG/1; MG/1
1 TABLET ORAL EVERY 6 HOURS PRN
Status: DISCONTINUED | OUTPATIENT
Start: 2020-12-01 | End: 2020-12-05

## 2020-12-01 RX ORDER — HYDROMORPHONE HYDROCHLORIDE 1 MG/ML
0.5 INJECTION, SOLUTION INTRAMUSCULAR; INTRAVENOUS; SUBCUTANEOUS ONCE
Status: COMPLETED | OUTPATIENT
Start: 2020-12-01 | End: 2020-12-01

## 2020-12-01 RX ORDER — INSULIN GLARGINE 100 [IU]/ML
15 INJECTION, SOLUTION SUBCUTANEOUS NIGHTLY
Status: DISCONTINUED | OUTPATIENT
Start: 2020-12-02 | End: 2020-12-06

## 2020-12-01 RX ORDER — SODIUM CHLORIDE 0.9 % (FLUSH) 0.9 %
10 SYRINGE (ML) INJECTION AS NEEDED
Status: DISCONTINUED | OUTPATIENT
Start: 2020-12-01 | End: 2020-12-07 | Stop reason: HOSPADM

## 2020-12-01 RX ORDER — ONDANSETRON 2 MG/ML
4 INJECTION INTRAMUSCULAR; INTRAVENOUS ONCE
Status: COMPLETED | OUTPATIENT
Start: 2020-12-01 | End: 2020-12-01

## 2020-12-01 RX ORDER — MORPHINE SULFATE 2 MG/ML
2 INJECTION, SOLUTION INTRAMUSCULAR; INTRAVENOUS ONCE
Status: COMPLETED | OUTPATIENT
Start: 2020-12-01 | End: 2020-12-01

## 2020-12-01 RX ORDER — NICOTINE POLACRILEX 4 MG
15 LOZENGE BUCCAL
Status: DISCONTINUED | OUTPATIENT
Start: 2020-12-01 | End: 2020-12-07 | Stop reason: HOSPADM

## 2020-12-01 RX ORDER — CARVEDILOL 12.5 MG/1
12.5 TABLET ORAL 2 TIMES DAILY WITH MEALS
Status: DISCONTINUED | OUTPATIENT
Start: 2020-12-02 | End: 2020-12-07 | Stop reason: HOSPADM

## 2020-12-01 RX ORDER — ACETAMINOPHEN 325 MG/1
650 TABLET ORAL EVERY 4 HOURS PRN
Status: DISCONTINUED | OUTPATIENT
Start: 2020-12-01 | End: 2020-12-07 | Stop reason: HOSPADM

## 2020-12-01 RX ORDER — HYDROMORPHONE HYDROCHLORIDE 1 MG/ML
0.5 INJECTION, SOLUTION INTRAMUSCULAR; INTRAVENOUS; SUBCUTANEOUS
Status: DISCONTINUED | OUTPATIENT
Start: 2020-12-01 | End: 2020-12-06

## 2020-12-01 RX ORDER — GABAPENTIN 300 MG/1
300 CAPSULE ORAL NIGHTLY
Qty: 30 CAPSULE | Refills: 0 | Status: SHIPPED | OUTPATIENT
Start: 2020-12-01 | End: 2020-12-07 | Stop reason: HOSPADM

## 2020-12-01 RX ORDER — CARVEDILOL 12.5 MG/1
12.5 TABLET ORAL 2 TIMES DAILY WITH MEALS
COMMUNITY
End: 2020-12-22 | Stop reason: HOSPADM

## 2020-12-01 RX ORDER — ONDANSETRON 2 MG/ML
4 INJECTION INTRAMUSCULAR; INTRAVENOUS EVERY 6 HOURS PRN
Status: DISCONTINUED | OUTPATIENT
Start: 2020-12-01 | End: 2020-12-07 | Stop reason: HOSPADM

## 2020-12-01 RX ORDER — ACETAMINOPHEN 650 MG/1
650 SUPPOSITORY RECTAL EVERY 4 HOURS PRN
Status: DISCONTINUED | OUTPATIENT
Start: 2020-12-01 | End: 2020-12-07 | Stop reason: HOSPADM

## 2020-12-01 RX ORDER — ACETAMINOPHEN 160 MG/5ML
650 SOLUTION ORAL EVERY 4 HOURS PRN
Status: DISCONTINUED | OUTPATIENT
Start: 2020-12-01 | End: 2020-12-07 | Stop reason: HOSPADM

## 2020-12-01 RX ORDER — SODIUM CHLORIDE 0.9 % (FLUSH) 0.9 %
10 SYRINGE (ML) INJECTION EVERY 12 HOURS SCHEDULED
Status: DISCONTINUED | OUTPATIENT
Start: 2020-12-01 | End: 2020-12-07 | Stop reason: HOSPADM

## 2020-12-01 RX ORDER — CHOLECALCIFEROL (VITAMIN D3) 125 MCG
5000 CAPSULE ORAL DAILY
COMMUNITY

## 2020-12-01 RX ORDER — DEXTROSE MONOHYDRATE 25 G/50ML
25 INJECTION, SOLUTION INTRAVENOUS
Status: DISCONTINUED | OUTPATIENT
Start: 2020-12-01 | End: 2020-12-07 | Stop reason: HOSPADM

## 2020-12-01 RX ADMIN — HYDROMORPHONE HYDROCHLORIDE 0.5 MG: 10 INJECTION INTRAMUSCULAR; INTRAVENOUS; SUBCUTANEOUS at 21:16

## 2020-12-01 RX ADMIN — MORPHINE SULFATE 2 MG: 2 INJECTION, SOLUTION INTRAMUSCULAR; INTRAVENOUS at 16:48

## 2020-12-01 RX ADMIN — HYDROMORPHONE HYDROCHLORIDE 0.5 MG: 10 INJECTION INTRAMUSCULAR; INTRAVENOUS; SUBCUTANEOUS at 19:31

## 2020-12-01 RX ADMIN — SODIUM CHLORIDE, PRESERVATIVE FREE 10 ML: 5 INJECTION INTRAVENOUS at 21:15

## 2020-12-01 RX ADMIN — HYDROMORPHONE HYDROCHLORIDE 0.5 MG: 1 INJECTION, SOLUTION INTRAMUSCULAR; INTRAVENOUS; SUBCUTANEOUS at 18:18

## 2020-12-01 RX ADMIN — ONDANSETRON 4 MG: 2 INJECTION INTRAMUSCULAR; INTRAVENOUS at 16:49

## 2020-12-01 NOTE — ED NOTES
Pt was at a doctor's appt and tripped in the parking lot and fell. Pt reports left hip pain and is unable to ambulate. EMS treated with 100mcg Fentanyl with 4mg zofran.     Pt was wearing a face mask upon arrival.     Gisselle Correia RN  12/01/20 2350

## 2020-12-01 NOTE — PROGRESS NOTES
Subjective   Dilip Verma is a 70 y.o. male.  Patient with ongoing hypertension blood pressure satisfactory today here for follow-up has low vitamin D level also is having bilateral leg edema will need to adjust his medications apparently with hydralazine and are occurring in his leg edema related to try Coreg at a higher dose and see if that resolves leg edema - hydralazine he has had lab work done recently we will try to retain that her updating that  Body mass index is 31.29 kg/m².  History of Present Illness   Follow-up on blood pressure leg edema which patient feels related to side effects from medicines for this being hydralazine so we will try to upper adjust his Coreg instead to see if this helps if not we will consider diuretics does need updated medication monitoring for gabapentin with compliance drug screen as well as opioid drug contract.  Does have a low vitamin D which has been checked by his endocrine.  hbp         Concern including ACE inhibitor's cause angioedema clonidine side effects undefined losartan angioedema amlodipine swelling Ativan irritability  Confusion tetracycline undefined.  Former smoker quit in 2000.  Family history positive for stroke depression cancer undefined arrhythmia heart disease kidney disease thyroid disease alcohol abuse depression and glaucoma  Review of Systems   Constitutional: Negative.    HENT: Negative.    All other systems reviewed and are negative.      Objective   Vitals:    12/01/20 1304   BP: 130/78   Pulse: 76   Temp: 96.6 °F (35.9 °C)   SpO2: 95%   Weight: 96.2 kg (212 lb)     Physical Exam  Vitals signs and nursing note reviewed.   Constitutional:       Appearance: Normal appearance.   HENT:      Head: Normocephalic and atraumatic.   Eyes:      Conjunctiva/sclera: Conjunctivae normal.      Pupils: Pupils are equal, round, and reactive to light.   Cardiovascular:      Rate and Rhythm: Normal rate and regular rhythm.      Heart sounds: Normal heart sounds.    Pulmonary:      Effort: Pulmonary effort is normal.      Breath sounds: Normal breath sounds.   Abdominal:      General: Abdomen is flat. Bowel sounds are normal.      Palpations: Abdomen is soft.   Skin:     General: Skin is warm and dry.      Comments: Mild abrasion left shin will have patient take Bactroban twice daily to protect the shin area where he scraped it patient with 2+ foot edema on distal lower leg will adjust medication see if this is helpful left right posterior tibial pulse are 1+.  No overt infection.   Neurological:      General: No focal deficit present.      Mental Status: He is alert.      Comments: Ambulates with some difficulty does.  Somewhat unsteady on  feet.         Lab Results   Component Value Date    INR 1.13 (H) 02/25/2020    INR 1.48 (H) 07/19/2019    INR 1.63 (H) 07/18/2019       Procedures    Assessment/Plan .  1.  Hypertension see below  Stop hydralazine increase Coreg to 12.5 mg twice daily, blood pressure readings 1 week's time let us know if blood pressure running higher sooner and we will see if this helps with leg edema awaiting pending lab work from [facility] does need updated per contract and urine drug screen for the gabapentin    2.  Bilateral leg edema we will get urine try get of the lab already ordered by his endocrinologist initially just blood pressure medicine this resolves leg edema if not we will consider a diuretic such as Lasix.  Or Bumex    3.  Low vitamin D reduction through his endocrine on that    4.  Neuropathy of lower extremities    5.  Medication monitoring with updated compliance drug screen as well as contract will refill his gabapentin.    Much of this encounter note is an electronic transcription/translation of spoken language to printed text.  The electronic translation of spoken language may permit erroneous, or at times, nonsensical words or phrases to be inadvertently transcribed.  Although I have reviewed the note for such errors, some may  still exist. If there are questions or for further clarification, please contact me.  There are no diagnoses linked to this encounter.

## 2020-12-01 NOTE — ED PROVIDER NOTES
EMERGENCY DEPARTMENT ENCOUNTER    Room Number:  DANIEL/DANIEL  Date of encounter:  12/1/2020  PCP: Vishal Adrian Jr., MD  Historian: Patient     I used full protective equipment while examining this patient.  This includes face mask, gloves and protective eyewear.  I washed my hands before entering the room and immediately upon leaving the room.  Patient was wearing a surgical mask.      HPI:  Chief Complaint: Fall  A complete HPI/ROS/PMH/PSH/SH/FH are unobtainable due to: None    Context: Dilip Verma is a 70 y.o. male who presents to the ED c/o falling around 2 PM.  Patient was walking across a parking lot when he tripped and fell.  Patient complains of left hip pain and left index finger pain.  Patient states he scraped his head but denies loss of consciousness or headache.  Patient has been unable to stand or ambulate since falling.  Pain is severe and is worse with movement.  Patient was given fentanyl and Zofran by EMS.  Patient denies neck pain, back pain, arm pain, chest pain, abdominal pain, or numbness/tingling in his extremities.  Patient takes aspirin but is not on any other anticoagulants.      PAST MEDICAL HISTORY  Active Ambulatory Problems     Diagnosis Date Noted   • Anxiety    • Colon polyp    • Diabetes mellitus, type II (CMS/[facility])    • Erectile dysfunction    • Hyperlipidemia    • CAD (coronary artery disease) 07/20/2019   • Hx of CABG 08/19/2019   • Hypersomnia due to medical condition 09/14/2019   • CSA (central sleep apnea) 09/14/2019   • Periodic breathing 09/14/2019   • Hypoxia 02/26/2020     Resolved Ambulatory Problems     Diagnosis Date Noted   • NSTEMI (non-ST elevated myocardial infarction) (CMS/[facility]) 07/12/2019   • Orthostasis 07/20/2019   • Chronic diastolic CHF (congestive heart failure) (CMS/HCC) 09/11/2019   • Labile hypertension 02/26/2020     Past Medical History:   Diagnosis Date   • Chronic diastolic (congestive) heart failure (CMS/[facility])    • Chronic kidney disease    • Coronary  artery disease    • H/O bone density study never   • Hypersomnia    • Hypertension    • Kidney stone    • Routine eye exam 2015   • Sleep apnea    • Stroke (CMS/HCC)          PAST SURGICAL HISTORY  Past Surgical History:   Procedure Laterality Date   • CARDIAC CATHETERIZATION N/A 7/15/2019    Procedure: Coronary angiography;  Surgeon: Carrie Price MD;  Location:  [facility] CATH INVASIVE LOCATION;  Service: Cardiovascular   • CARDIAC CATHETERIZATION N/A 7/15/2019    Procedure: Left Heart Cath;  Surgeon: Carrie Price MD;  Location:  [facility]ATION;  Service: Cardiovascular   • CARDIAC CATHETERIZATION N/A 7/15/2019    Procedure: Left ventriculography;  Surgeon: Carrie Price MD;  Location: [facility] C[facility]VAS[facility];  Service: Cardiovascular   • CARDIAC CATHETERIZATION  7/15/2019    Procedure: Functional Flow [place];  Surgeon: Carrie Price MD;  Location[facility];  Service: Cardiovascular   • CARDIAC CATHETERIZATION N/A 11/6/2019    Procedure: Right and Left Heart Cath;  Surgeon: Mya Smith MD;  Location: [facility];  Service: Cardiovascular   • CARDIAC CATHETERIZATION N/A 11/6/2019    Procedure: Coronary angiography;  Surgeon: Mya Smith MD;  Location: [facility] C[facility];  Service: Cardiovascular   • CATARACT EXTRACTION EXTRACAPSULAR W/ INTRAOCULAR LENS IMPLANTATION Bilateral    • COLONOSCOPY  01/2015    dr [email or web address]   • CORONARY ARTERY BYPASS GRAFT N/A 7/18/2019    Procedure: INTRAOPERATIVE SHOAIB; STERNOTOMY CORONARY ARTERY BYPASS x 3  USING LEFT INTERNAL MAMMARY ARTERY GRAFT UTILIZING ENDOSCOPICALLY HARVESTED RIGHT GREATER SAPHENOUS VEIN AND PRP.;  Surgeon: Bill Devi MD;  Location: [facility] MAIN OR;  Service: Cardiothoracic   • DENTAL PROCEDURE      3 surgeries inder implants   • HERNIA REPAIR      inguinal bilaterally   • KIDNEY STONE SURGERY      lithotripsy         FAMILY HISTORY  Family History   Problem Relation Age of Onset    • Depression Mother    • Cancer Mother         uterine   • Arrhythmia Mother    • Heart disease Father    • Hypertension Father    • Kidney disease Father    • Thyroid disease Father    • Depression Sister    • Alcohol abuse Brother    • Alcohol abuse Other    • Alcohol abuse Other    • Lung disease Other    • Thyroid disease Other    • Glaucoma Other    • Heart attack Other    • Stroke Maternal Grandmother    • Stroke Paternal Grandmother          SOCIAL HISTORY  Social History     Socioeconomic History   • Marital status:      Spouse name: Not on file   • Number of children: Not on file   • Years of education: Not on file   • Highest education level: Not on file   Tobacco Use   • Smoking status: Former Smoker     Packs/day: 0.75     Years: 16.00     Pack years: 12.00     Quit date:      Years since quittin.9   • Smokeless tobacco: Never Used   • Tobacco comment: Start age:39/Stopping age:47, 3/4 PPD   Substance and Sexual Activity   • Alcohol use: No     Comment: caffeine use - 1 cup daily   • Drug use: No   • Sexual activity: Defer         ALLERGIES  Ace inhibitors, Clonidine derivatives, Losartan, Amlodipine, Ativan [lorazepam], Minoxidil, and Tetracycline       REVIEW OF SYSTEMS  Review of Systems      All systems have been reviewed and are negative except as as discussed in the HPI    PHYSICAL EXAM    I have reviewed the triage vital signs and nursing notes.    ED Triage Vitals   Temp Heart Rate Resp BP SpO2   20 1502 20 1501 20 1501 20 1501 20 1501   97.1 °F (36.2 °C) 83 18 (!) 214/108 95 %      Temp src Heart Rate Source Patient Position BP Location FiO2 (%)   -- -- -- -- --              Physical Exam  GENERAL: Awake, alert  HENT: Small abrasion on the forehead, nares patent, moist mucous membranes, no bony tenderness of the face  NECK: supple, no cervical spine tenderness  EYES: no scleral icterus, extra muscles intact  CV: regular rhythm, regular rate, no  murmur  RESPIRATORY: normal effort, clear to auscultation bilaterally, no chest tenderness  ABDOMEN: soft, nontender, nondistended  MUSCULOSKELETAL: There is tenderness of the left anterior hip.  There is decreased range of motion of the right leg secondary to pain.  There is a subungual hematoma of the left index finger.  The nail of this finger is slightly lax.  There is an area of superficial skin avulsion at the base of this nail.  There is no laceration.  Remainder of the left arm, right arm, and right leg are nontender.  Pelvis is stable.  No vertebral tenderness of the T or L-spine.  NEURO: Strength, sensation, and coordination are grossly intact.  Speech and mentation are unremarkable.  No facial droop.  SKIN: warm, dry, no rash  PSYCH: Normal mood and affect      LAB RESULTS  Recent Results (from the past 24 hour(s))   Comprehensive Metabolic Panel    Collection Time: 12/01/20  3:56 PM    Specimen: Blood   Result Value Ref Range    Glucose 312 (H) 65 - 99 mg/dL    BUN 29 (H) 8 - 23 mg/dL    Creatinine 1.23 0.76 - 1.27 mg/dL    Sodium 139 136 - 145 mmol/L    Potassium 4.5 3.5 - 5.2 mmol/L    Chloride 99 98 - 107 mmol/L    CO2 30.9 (H) 22.0 - 29.0 mmol/L    Calcium 9.2 8.6 - 10.5 mg/dL    Total Protein 6.9 6.0 - 8.5 g/dL    Albumin 3.90 3.50 - 5.20 g/dL    ALT (SGPT) 20 1 - 41 U/L    AST (SGOT) 17 1 - 40 U/L    Alkaline Phosphatase 71 39 - 117 U/L    Total Bilirubin 0.6 0.0 - 1.2 mg/dL    eGFR Non African Amer 58 (L) >60 mL/min/1.73    Globulin 3.0 gm/dL    A/G Ratio 1.3 g/dL    BUN/Creatinine Ratio 23.6 7.0 - 25.0    Anion Gap 9.1 5.0 - 15.0 mmol/L   aPTT    Collection Time: 12/01/20  3:56 PM    Specimen: Blood   Result Value Ref Range    PTT 31.3 22.7 - 35.4 seconds   Protime-INR    Collection Time: 12/01/20  3:56 PM    Specimen: Blood   Result Value Ref Range    Protime 13.7 11.7 - 14.2 Seconds    INR 1.06 0.90 - 1.10   CBC Auto Differential    Collection Time: 12/01/20  3:56 PM    Specimen: Blood   Result  Value Ref Range    WBC 10.36 3.40 - 10.80 10*3/mm3    RBC 5.01 4.14 - 5.80 10*6/mm3    Hemoglobin 15.5 13.0 - 17.7 g/dL    Hematocrit 45.7 37.5 - 51.0 %    MCV 91.2 79.0 - 97.0 fL    MCH 30.9 26.6 - 33.0 pg    MCHC 33.9 31.5 - 35.7 g/dL    RDW 13.0 12.3 - 15.4 %    RDW-SD 43.1 37.0 - 54.0 fl    MPV 10.3 6.0 - 12.0 fL    Platelets 197 140 - 450 10*3/mm3    Neutrophil % 82.7 (H) 42.7 - 76.0 %    Lymphocyte % 9.7 (L) 19.6 - 45.3 %    Monocyte % 5.2 5.0 - 12.0 %    Eosinophil % 1.8 0.3 - 6.2 %    Basophil % 0.2 0.0 - 1.5 %    Immature Grans % 0.4 0.0 - 0.5 %    Neutrophils, Absolute 8.56 (H) 1.70 - 7.00 10*3/mm3    Lymphocytes, Absolute 1.01 0.70 - 3.10 10*3/mm3    Monocytes, Absolute 0.54 0.10 - 0.90 10*3/mm3    Eosinophils, Absolute 0.19 0.00 - 0.40 10*3/mm3    Basophils, Absolute 0.02 0.00 - 0.20 10*3/mm3    Immature Grans, Absolute 0.04 0.00 - 0.05 10*3/mm3    nRBC 0.0 0.0 - 0.2 /100 WBC       Ordered the above labs and independently reviewed the results.      RADIOLOGY  Xr Hand 3+ View Left    Result Date: 12/1/2020  XR HAND 3+ VW LEFT-  INDICATIONS: Trauma to the left index finger  TECHNIQUE: 3 views of the left hand  COMPARISON: None available  FINDINGS:  No acute fracture, erosion, or dislocation is identified. A ring on the fourth finger, overlying dressing material, and a wristwatch partly obscure. Soft tissue swelling of the second finger may be evidence of injury, inflammation, infection, correlate clinically. Follow-up/further evaluation can be obtained as indications persist. Arterial calcification is present.         As described.    This report was finalized on 12/1/2020 6:05 PM by Dr. Gabe Zimmer M.D.      Ct Head Without Contrast    Result Date: 12/1/2020  HEAD CT WITHOUT CONTRAST  HISTORY: Fall. Trauma to the for head.  TECHNIQUE: Noncontrast head CT is provided and correlated brain MRI 07/16/2019 and head CT angiogram 07/15/2019.  Radiation dose reduction techniques were utilized, including  automated exposure control and exposure modulation based on body size.  FINDINGS: The ventricles are normal in caliber. There is some patchy low density in the cerebral white matter which appears similar to that seen previously. No intracranial hemorrhage is present. Bones of the skull are intact. Orbital structures appear grossly normal. There is some mild mucosal thickening in the paranasal sinuses but there is no paranasal sinus fluid. Mastoid air cells and middle ears are clear.      Stable chronic changes in the brain. No acute abnormality identified on head CT.  This report was finalized on 12/1/2020 6:33 PM by Dr. Amador Denton M.D.      Xr Chest 1 View    Result Date: 12/1/2020  XR CHEST 1 VW-  HISTORY: Male who is 70 years-old,  preoperative evaluation  TECHNIQUE: Frontal view of the chest  COMPARISON: 02/29/2020  FINDINGS: Heart, mediastinum and pulmonary vasculature are unremarkable. Sternotomy wires are noted. Minimal atelectasis at the right lung base. No pleural effusion, or pneumothorax is seen, limited by supine positioning. No acute osseous process.      Minimal likely atelectasis at the right lung base.  This report was finalized on 12/1/2020 6:01 PM by Dr. Gabe Zimmer M.D.      Xr Hip With Or Without Pelvis 2 - 3 View Left    Result Date: 12/1/2020  XR HIP W OR WO PELVIS 2-3 VIEW LEFT-  INDICATIONS: Trauma  TECHNIQUE: Frontal view of the pelvis, 2 views of the left hip  COMPARISON: None available  FINDINGS:  Comminuted, angulated left intertrochanteric fracture is noted, with apparent extension of fracture lucency into the femoral neck. No other fractures are identified. No dislocation. Mild bilateral hip joint space narrowing. Degenerative changes are seen in the partly included lumbar spine.       Proximal left femur fracture.  This report was finalized on 12/1/2020 5:59 PM by Dr. Gabe Zimmer M.D.        I ordered the above noted radiological studies. Reviewed by me and discussed  with radiologist.  See dictation for official radiology interpretation.      PROCEDURES  Procedures      MEDICATIONS GIVEN IN ER    Medications   sodium chloride 0.9 % flush 10 mL (has no administration in time range)   HYDROmorphone (DILAUDID) injection 0.5 mg (0.5 mg Intravenous Given 12/1/20 1931)   dextrose (GLUTOSE) oral gel 15 g (has no administration in time range)   dextrose (D50W) 25 g/ 50mL Intravenous Solution 25 g (has no administration in time range)   glucagon (human recombinant) (GLUCAGEN DIAGNOSTIC) injection 1 mg (has no administration in time range)   insulin lispro (humaLOG) injection 0-14 Units (has no administration in time range)   sodium chloride 0.9 % flush 10 mL (has no administration in time range)   sodium chloride 0.9 % flush 10 mL (has no administration in time range)   acetaminophen (TYLENOL) tablet 650 mg (has no administration in time range)     Or   acetaminophen (TYLENOL) 160 MG/5ML solution 650 mg (has no administration in time range)     Or   acetaminophen (TYLENOL) suppository 650 mg (has no administration in time range)   morphine injection 2 mg (2 mg Intravenous Given 12/1/20 1648)   ondansetron (ZOFRAN) injection 4 mg (4 mg Intravenous Given 12/1/20 1649)   HYDROmorphone (DILAUDID) injection 0.5 mg (0.5 mg Intravenous Given 12/1/20 1818)         PROGRESS, DATA ANALYSIS, CONSULTS, AND MEDICAL DECISION MAKING    All labs have been independently reviewed by me.  All radiology studies have been reviewed by me and discussed with radiologist dictating the report.   EKG's independently viewed and interpreted by me.  I have reviewed the nurse's notes, vital signs, past medical history, and medication list.  Discussion below represents my analysis of pertinent findings related to patient's condition, differential diagnosis, treatment plan and final disposition.      ED Course as of Dec 01 2023   Tue Dec 01, 2020   1725 X-rays independently interpreted by me.  There is a left  intertrochanteric femur fracture.    [WH]   1840 Test results and plan for admission were discussed with the patient.  He states that his pain has improved.    [WH]   1855 Case discussed with Dr. Shah and he agrees to admit the patient.  Pertinent exam findings, test results, and ED course were discussed with him.    [WH]   1905 Head CT is negative acute.    [WH]   1906 Case discussed with Dr. Mccray and he agrees to see the patient in consult.    [WH]   1918 Procedure note: The nail of the left index finger was prepped with Betadine.  Subungual hematoma was drained using electrocautery trephination.  A scant amount of blood drained following trephination.  Patient tolerated procedure well.  There were no complications.    [WH]      ED Course User Index  [WH] Jose Ramon Deshpande MD       AS OF 20:23 EST VITALS:    BP - 169/90  HR - 84  TEMP - 97.1 °F (36.2 °C)  O2 SATS - 94%      DIAGNOSIS  Final diagnoses:   Closed nondisplaced intertrochanteric fracture of left femur, initial encounter (CMS/[facility])   Subungual hematoma of finger, initial encounter   Fall, initial encounter   Abrasion of right forearm, initial encounter         DISPOSITION  Admission    ADMISSION    Discussed treatment plan and reason for admission with pt/family and admitting physician.  Pt/family voiced understanding of the plan for admission for further testing/treatment as needed.         Dictated utilizing Dragon dictation:  Much of this encounter note is an electronic transcription/translation of spoken language to printed text. The electronic translation of spoken language may permit erroneous, or at times, nonsensical words or phrases to be inadvertently transcribed; Although I have reviewed the note for such errors, some may still exist.     Jose Ramon Deshpande MD  12/01/20 2023

## 2020-12-02 ENCOUNTER — TELEPHONE (OUTPATIENT)
Dept: CARDIOLOGY | Facility: CLINIC | Age: 70
End: 2020-12-02

## 2020-12-02 PROBLEM — R33.9 URINARY RETENTION: Status: ACTIVE | Noted: 2020-12-02

## 2020-12-02 LAB
ANION GAP SERPL CALCULATED.3IONS-SCNC: 7.5 MMOL/L (ref 5–15)
BUN SERPL-MCNC: 32 MG/DL (ref 8–23)
BUN/CREAT SERPL: 26.4 (ref 7–25)
CALCIUM SPEC-SCNC: 8.9 MG/DL (ref 8.6–10.5)
CHLORIDE SERPL-SCNC: 102 MMOL/L (ref 98–107)
CO2 SERPL-SCNC: 28.5 MMOL/L (ref 22–29)
CREAT SERPL-MCNC: 1.21 MG/DL (ref 0.76–1.27)
DEPRECATED RDW RBC AUTO: 43.6 FL (ref 37–54)
ERYTHROCYTE [DISTWIDTH] IN BLOOD BY AUTOMATED COUNT: 13.1 % (ref 12.3–15.4)
GFR SERPL CREATININE-BSD FRML MDRD: 59 ML/MIN/1.73
GLUCOSE BLDC GLUCOMTR-MCNC: 130 MG/DL (ref 70–130)
GLUCOSE BLDC GLUCOMTR-MCNC: 139 MG/DL (ref 70–130)
GLUCOSE BLDC GLUCOMTR-MCNC: 148 MG/DL (ref 70–130)
GLUCOSE BLDC GLUCOMTR-MCNC: 221 MG/DL (ref 70–130)
GLUCOSE SERPL-MCNC: 256 MG/DL (ref 65–99)
HBA1C MFR BLD: 9.5 % (ref 4.8–5.6)
HCT VFR BLD AUTO: 43.7 % (ref 37.5–51)
HGB BLD-MCNC: 14.9 G/DL (ref 13–17.7)
MCH RBC QN AUTO: 31 PG (ref 26.6–33)
MCHC RBC AUTO-ENTMCNC: 34.1 G/DL (ref 31.5–35.7)
MCV RBC AUTO: 90.9 FL (ref 79–97)
PLATELET # BLD AUTO: 203 10*3/MM3 (ref 140–450)
PMV BLD AUTO: 10.7 FL (ref 6–12)
POTASSIUM SERPL-SCNC: 4.7 MMOL/L (ref 3.5–5.2)
RBC # BLD AUTO: 4.81 10*6/MM3 (ref 4.14–5.8)
SODIUM SERPL-SCNC: 138 MMOL/L (ref 136–145)
TROPONIN T SERPL-MCNC: 0.02 NG/ML (ref 0–0.03)
WBC # BLD AUTO: 15.76 10*3/MM3 (ref 3.4–10.8)

## 2020-12-02 PROCEDURE — 82962 GLUCOSE BLOOD TEST: CPT

## 2020-12-02 PROCEDURE — 84484 ASSAY OF TROPONIN QUANT: CPT | Performed by: HOSPITALIST

## 2020-12-02 PROCEDURE — 63710000001 INSULIN GLARGINE PER 5 UNITS: Performed by: HOSPITALIST

## 2020-12-02 PROCEDURE — 85027 COMPLETE CBC AUTOMATED: CPT | Performed by: NURSE PRACTITIONER

## 2020-12-02 PROCEDURE — 25010000002 ONDANSETRON PER 1 MG: Performed by: NURSE PRACTITIONER

## 2020-12-02 PROCEDURE — 25010000002 HYDROMORPHONE PER 4 MG: Performed by: NURSE PRACTITIONER

## 2020-12-02 PROCEDURE — 63710000001 INSULIN LISPRO (HUMAN) PER 5 UNITS: Performed by: NURSE PRACTITIONER

## 2020-12-02 PROCEDURE — 99222 1ST HOSP IP/OBS MODERATE 55: CPT | Performed by: INTERNAL MEDICINE

## 2020-12-02 PROCEDURE — 83036 HEMOGLOBIN GLYCOSYLATED A1C: CPT | Performed by: NURSE PRACTITIONER

## 2020-12-02 PROCEDURE — 36415 COLL VENOUS BLD VENIPUNCTURE: CPT | Performed by: NURSE PRACTITIONER

## 2020-12-02 PROCEDURE — 80048 BASIC METABOLIC PNL TOTAL CA: CPT | Performed by: NURSE PRACTITIONER

## 2020-12-02 RX ORDER — TAMSULOSIN HYDROCHLORIDE 0.4 MG/1
0.4 CAPSULE ORAL DAILY
Status: DISCONTINUED | OUTPATIENT
Start: 2020-12-02 | End: 2020-12-04

## 2020-12-02 RX ADMIN — CARVEDILOL 12.5 MG: 12.5 TABLET, FILM COATED ORAL at 08:00

## 2020-12-02 RX ADMIN — CARVEDILOL 12.5 MG: 12.5 TABLET, FILM COATED ORAL at 18:10

## 2020-12-02 RX ADMIN — INSULIN GLARGINE 15 UNITS: 100 INJECTION, SOLUTION SUBCUTANEOUS at 00:07

## 2020-12-02 RX ADMIN — SODIUM CHLORIDE, PRESERVATIVE FREE 10 ML: 5 INJECTION INTRAVENOUS at 21:00

## 2020-12-02 RX ADMIN — HYDROMORPHONE HYDROCHLORIDE 0.5 MG: 10 INJECTION INTRAMUSCULAR; INTRAVENOUS; SUBCUTANEOUS at 16:27

## 2020-12-02 RX ADMIN — HYDROCODONE BITARTRATE AND ACETAMINOPHEN 1 TABLET: 5; 325 TABLET ORAL at 13:03

## 2020-12-02 RX ADMIN — ONDANSETRON 4 MG: 2 INJECTION INTRAMUSCULAR; INTRAVENOUS at 00:08

## 2020-12-02 RX ADMIN — HYDROMORPHONE HYDROCHLORIDE 0.5 MG: 10 INJECTION INTRAMUSCULAR; INTRAVENOUS; SUBCUTANEOUS at 20:49

## 2020-12-02 RX ADMIN — SODIUM CHLORIDE, PRESERVATIVE FREE 10 ML: 5 INJECTION INTRAVENOUS at 08:00

## 2020-12-02 RX ADMIN — INSULIN GLARGINE 15 UNITS: 100 INJECTION, SOLUTION SUBCUTANEOUS at 22:22

## 2020-12-02 RX ADMIN — HYDROCODONE BITARTRATE AND ACETAMINOPHEN 1 TABLET: 5; 325 TABLET ORAL at 22:23

## 2020-12-02 RX ADMIN — CARVEDILOL 12.5 MG: 12.5 TABLET, FILM COATED ORAL at 01:14

## 2020-12-02 RX ADMIN — INSULIN LISPRO 5 UNITS: 100 INJECTION, SOLUTION INTRAVENOUS; SUBCUTANEOUS at 08:00

## 2020-12-02 RX ADMIN — INSULIN LISPRO 8 UNITS: 100 INJECTION, SOLUTION INTRAVENOUS; SUBCUTANEOUS at 00:08

## 2020-12-02 RX ADMIN — HYDROMORPHONE HYDROCHLORIDE 0.5 MG: 10 INJECTION INTRAMUSCULAR; INTRAVENOUS; SUBCUTANEOUS at 23:19

## 2020-12-02 RX ADMIN — TAMSULOSIN HYDROCHLORIDE 0.4 MG: 0.4 CAPSULE ORAL at 22:23

## 2020-12-02 RX ADMIN — HYDROMORPHONE HYDROCHLORIDE 0.5 MG: 10 INJECTION INTRAMUSCULAR; INTRAVENOUS; SUBCUTANEOUS at 04:34

## 2020-12-02 RX ADMIN — HYDROMORPHONE HYDROCHLORIDE 0.5 MG: 10 INJECTION INTRAMUSCULAR; INTRAVENOUS; SUBCUTANEOUS at 10:56

## 2020-12-02 RX ADMIN — HYDROMORPHONE HYDROCHLORIDE 0.5 MG: 10 INJECTION INTRAMUSCULAR; INTRAVENOUS; SUBCUTANEOUS at 01:18

## 2020-12-02 RX ADMIN — HYDROMORPHONE HYDROCHLORIDE 0.5 MG: 10 INJECTION INTRAMUSCULAR; INTRAVENOUS; SUBCUTANEOUS at 08:00

## 2020-12-02 NOTE — TELEPHONE ENCOUNTER
Sofia from [facility] called and said he was admitted for a hip fracture last night. She was wondering if you can come evaluate him for surgery tomorrow.

## 2020-12-02 NOTE — ED NOTES
Nursing report ED to floor  Dilip Verma  70 y.o.  male    HPI (triage note):   Chief Complaint   Patient presents with   • Hip Pain       Admitting doctor:   Que Shah MD    Admitting diagnosis:   The primary encounter diagnosis was Closed nondisplaced intertrochanteric fracture of left femur, initial encounter (CMS/[facility]). Diagnoses of Subungual hematoma of finger, initial encounter, Fall, initial encounter, and Abrasion of right forearm, initial encounter were also pertinent to this visit.    Code status:   Current Code Status     Date Active Code Status Order ID Comments User Context       Prior    Advance Care Planning Activity          Allergies:   Ace inhibitors, Clonidine derivatives, Losartan, Amlodipine, Ativan [lorazepam], Minoxidil, and Tetracycline    Weight:   There were no vitals filed for this visit.    Most recent vitals:   Vitals:    12/01/20 1501 12/01/20 1502 12/01/20 1659   BP: (!) 214/108  156/69   Pulse: 83     Resp: 18     Temp:  97.1 °F (36.2 °C)    SpO2: 95%         Active LDAs/IV Access:   Lines, Drains & Airways    Active LDAs     Name:   Placement date:   Placement time:   Site:   Days:    Peripheral IV 12/01/20 1503 Left Antecubital   12/01/20    1503    Antecubital   less than 1    Pump Device   02/26/20    0945    --   279                Labs (abnormal labs have a star):   Labs Reviewed   COMPREHENSIVE METABOLIC PANEL - Abnormal; Notable for the following components:       Result Value    Glucose 312 (*)     BUN 29 (*)     CO2 30.9 (*)     eGFR Non  Amer 58 (*)     All other components within normal limits    Narrative:     GFR Normal >60  Chronic Kidney Disease <60  Kidney Failure <15     CBC WITH AUTO DIFFERENTIAL - Abnormal; Notable for the following components:    Neutrophil % 82.7 (*)     Lymphocyte % 9.7 (*)     Neutrophils, Absolute 8.56 (*)     All other components within normal limits   APTT - Normal   PROTIME-INR - Normal   COVID PRE-OP / PRE-PROCEDURE  SCREENING ORDER (NO ISOLATION)    Narrative:     The following orders were created for panel order COVID PRE-OP / PRE-PROCEDURE SCREENING ORDER (NO ISOLATION) - Swab, Nasopharynx.  Procedure                               Abnormality         Status                     ---------                               -----------         ------                     Respiratory Panel PCR w/...[[id number]]                                                   Please view results for these tests on the individual orders.   RESPIRATORY PANEL PCR W/ COVID-19 (SARS-COV-2) [facility] IN-HOUSE, NP SWAB IN [facility], 3-4 HR TAT   POCT GLUCOSE FINGERSTICK   POCT GLUCOSE FINGERSTICK   POCT GLUCOSE FINGERSTICK   POCT GLUCOSE FINGERSTICK   CBC AND DIFFERENTIAL    Narrative:     The following orders were created for panel order CBC & Differential.  Procedure                               Abnormality         Status                     ---------                               -----------         ------                     CBC Auto Differential[[id number]]        Abnormal            Final result                 Please view results for these tests on the individual orders.       EKG:   No orders to display       Meds given in ED:   Medications   sodium chloride 0.9 % flush 10 mL (has no administration in time range)   HYDROmorphone (DILAUDID) injection 0.5 mg (0.5 mg Intravenous Given 12/1/20 1931)   dextrose (GLUTOSE) oral gel 15 g (has no administration in time range)   dextrose (D50W) 25 g/ 50mL Intravenous Solution 25 g (has no administration in time range)   glucagon (human recombinant) (GLUCAGEN DIAGNOSTIC) injection 1 mg (has no administration in time range)   insulin lispro (humaLOG) injection 0-14 Units (has no administration in time range)   morphine injection 2 mg (2 mg Intravenous Given 12/1/20 1648)   ondansetron (ZOFRAN) injection 4 mg (4 mg Intravenous Given 12/1/20 1649)   HYDROmorphone (DILAUDID) injection 0.5 mg (0.5 mg  Intravenous Given 20)       Imaging results:  Xr Hand 3+ View Left    Result Date: 2020   As described.    This report was finalized on 2020 6:05 PM by Dr. Gabe Zimmer M.D.      Ct Head Without Contrast    Result Date: 2020  Stable chronic changes in the brain. No acute abnormality identified on head CT.  This report was finalized on 2020 6:33 PM by Dr. Amador Denton M.D.      Xr Chest 1 View    Result Date: 2020  Minimal likely atelectasis at the right lung base.  This report was finalized on 2020 6:01 PM by Dr. Gabe Zimmer M.D.      Xr Hip With Or Without Pelvis 2 - 3 View Left    Result Date: 2020   Proximal left femur fracture.  This report was finalized on 2020 5:59 PM by Dr. Gabe Zimmer M.D.        Ambulatory status:   - bedrest    Social issues:   Social History     Socioeconomic History   • Marital status:      Spouse name: Not on file   • Number of children: Not on file   • Years of education: Not on file   • Highest education level: Not on file   Tobacco Use   • Smoking status: Former Smoker     Packs/day: 0.75     Years: 16.00     Pack years: 12.00     Quit date:      Years since quittin.9   • Smokeless tobacco: Never Used   • Tobacco comment: Start age:39/Stopping age:47, 3/4 PPD   Substance and Sexual Activity   • Alcohol use: No     Comment: caffeine use - 1 cup daily   • Drug use: No   • Sexual activity: Defer    Nursing report ED to floor       HaydenChrissy RN  20 1936

## 2020-12-02 NOTE — CONSULTS
Diabetes Education    Patient Name:  Dilip Verma  YOB: 1950  MRN: [id number]  Admit Date:  12/1/2020    Surgery cancelled today.  Attempted to provide diabetes education for elevated A1c. Pt states he would like to hold off due to pain.  RN notified.     Electronically signed by:  Shital Tomas RN  12/02/20 13:45 EST

## 2020-12-02 NOTE — PROGRESS NOTES
Clinical Pharmacy Services: Medication History    Dilip Verma is a 70 y.o. male presenting to [facility] for   Chief Complaint   Patient presents with   • Hip Pain       He  has a past medical history of Anxiety, Chronic diastolic (congestive) heart failure (CMS/[facility]), Chronic kidney disease, Colon polyp, Coronary artery disease, Diabetes mellitus, type II (CMS/HCC), Erectile dysfunction, H/O bone density study (never), Hyperlipidemia, Hypersomnia, Hypertension, Kidney stone, Orthostasis, Periodic breathing, Routine eye exam (2015), Sleep apnea, and Stroke (CMS/[facility]).    Allergies as of 12/01/2020 - Reviewed 12/01/2020   Allergen Reaction Noted   • Ace inhibitors Angioedema 10/27/2015   • Clonidine derivatives Unknown - High Severity 02/26/2020   • Losartan Angioedema 03/01/2016   • Amlodipine Swelling 03/01/2020   • Ativan [lorazepam] Irritability 07/15/2019   • Minoxidil Confusion 08/13/2019   • Tetracycline Other (See Comments) 09/19/2014       Medication information was obtained from: patient's spouse  Pharmacy and Phone Number:     Prior to Admission Medications     Prescriptions Last Dose Informant Patient Reported? Taking?    aspirin 81 MG EC tablet  Spouse/Significant Other Yes Yes    Take 81 mg by mouth Daily.    Cholecalciferol (Vitamin D) 125 MCG (5000 UT) capsule capsule  Spouse/Significant Other Yes Yes    Take 5,000 Units by mouth Daily.    furosemide (LASIX) 40 MG tablet  Spouse/Significant Other No Yes    Take 1 tablet by mouth 2 (Two) Times a Day.    gabapentin (NEURONTIN) 300 MG capsule  Spouse/Significant Other No Yes    Take 1 capsule by mouth Every Night.    Patient taking differently:  Take 300 mg by mouth At Night As Needed.    Insulin Glargine (TOUJEO MAX SOLOSTAR SC)  Spouse/Significant Other Yes Yes    Inject 32 Units under the skin into the appropriate area as directed Daily.    insulin lispro (HUMALOG) 100 UNIT/ML injection  Spouse/Significant Other Yes Yes    Inject 0-6  Units under the skin into the appropriate area as directed As Needed. PER SS 2 units for -250, 4 units for -300, 6 units for -350    insulin lispro (humaLOG) 100 UNIT/ML injection  Spouse/Significant Other Yes Yes    Inject 8 Units under the skin into the appropriate area as directed Daily With Lunch.    insulin lispro (humaLOG) 100 UNIT/ML injection  Spouse/Significant Other Yes Yes    Inject 10 Units under the skin into the appropriate area as directed Daily With Dinner.    melatonin 3 MG tablet  Spouse/Significant Other Yes Yes    Take 3 mg by mouth Every Night.    NON FORMULARY  Spouse/Significant Other Yes Yes    Take 5 mL by mouth Daily. Vitamin B Complex liquid    pramipexole (MIRAPEX) 0.25 MG tablet  Spouse/Significant Other No Yes    Take 1 tablet by mouth 2 (Two) Times a Day As Needed (restless leg).    sildenafil (REVATIO) 20 MG tablet  Spouse/Significant Other No Yes    TAKE 1 TO 3 TABLETS BY MOUTH ONE HOUR PRIOR TO SEXUAL ACTIVITY. BEGIN WITH 1 TABLET AND MAX 3 TABLETS PER DAY    Patient taking differently:  Take 20-60 mg by mouth Daily As Needed. ONE HOUR PRIOR TO SEXUAL ACTIVITY. BEGIN WITH 1 TABLET AND MAX 3 TABLETS PER DAY    testosterone (ANDROGEL) 50 MG/5GM (1%) gel gel  Spouse/Significant Other Yes Yes    Place 50 mg on the skin as directed by provider 3 (Three) Times a Week.    carvedilol (COREG) 12.5 MG tablet  Spouse/Significant Other Yes No    Take 12.5 mg by mouth 2 (Two) Times a Day With Meals.            Medication notes: Patient has not yet started Coreg. Was given prescription earlier today, has not had a chance to start it yet. It is replacing his hydralazine.    This medication list is complete to the best of my knowledge as of 12/1/2020    Please call if questions.    Michaela Florence [facility]  Medication History Technician  [phone]    12/1/2020 19:23 EST

## 2020-12-02 NOTE — CONSULTS
Patient Name: Dilip Verma  :1950  70 y.o.    Date of Admission: 2020  Encounter Provider: Michaela Hylton MD  Date of Encounter Visit: 20  Place of Service: [facility] CARDIOLOGY  Referring Provider: Que Shah MD  Patient Care Team:  Vishal Adrian Jr., MD as PCP - General (Internal Medicine)  Morris Cai MD as Consulting Physician (Sleep Medicine)  Michaela Hylton MD as Consulting Physician (Cardiology)  Hardy Banerjee MD as Consulting Physician (Ophthalmology)  Chula Christianson MD as Consulting Physician (Ophthalmology)  Jason Sherman MD (Endocrinology)  Perla Mayen MD as Consulting Physician (Nephrology)  Federico Hebert MD as Consulting Physician (Pulmonary Disease)      Chief complaint: s/p mechanical fall    Reason for Consult: corwin-op cardiac management     History of Present Illness:     This is a gentleman who was hospitalized in 2019.  He has a history of hypertension, chronic diastolic heart failure, hyperlipidemia, obstructive sleep apnea, and diabetes.  He was admitted to [facility] in 2019 for syncope.  He was orthostatic with supine hypertension.  Clonidine was discontinue and hydralazine was increased.  He presented to [facility] on 2019, with uncontrolled blood pressure and blurry vision.  He had an abnormal BNP and troponin.  Echocardiogram on 2019, showed an ejection fraction of 53%, moderate left ventricular hypertrophy, mild to moderate left atrial enlargement, and no significant valvular heart disease.  Holter monitor in 2019 showed some nonsustained atrial tachycardia.  Because of his elevated troponin, he had a heart catheterization on 07/15/2019, which showed normal ejection fraction with severe multivessel coronary disease.  Carotid Doppler showed mild bilateral carotid artery stenosis. Transthoracic echocardiogram showed normal left ventricular function with  an ejection fraction of 55%, grade 3 diastolic dysfunction, and no significant valvular heart disease, though severe pulmonary hypertension was noted.  Of note, on his heart catheterization, his left ventricular end-diastolic pressure was 18.  On 07/18/2019, he underwent bypass surgery with a left internal mammary artery to the left anterior descending, vein graft to the obtuse marginal one, vein graft to the distal right coronary artery.  He had some post-operative bradycardia and his beta-blocker was held.  He had a brief of post-operative atrial fibrillation and was discharged on amiodarone.       In November of 2019, he underwent a stress test, which showed an ejection fraction of 52% with ischemia in the inferior and inferolateral walls.  Echocardiogram at the same time showed normal LV function with an ejection fraction of 62%, stage 2 diastolic dysfunction, mild tricuspid regurgitation with severe pulmonary hypertension.  Repeat heart catheterization in November 2019 showed patent bypass grafts with significant native vessel disease and elevated biventricular filling pressures.  Diuretics were recommended.  He had a follow up appointment with Marcella in January 2020 and at that time, his blood pressure was well controlled.  He was struggling with using a BiPAP machine.  He had no known reoccurrence of atrial fibrillation.  She did not make any significant medication changes.    He fell when leaving his primary doctor's office and suffered a hip fracture.  He was admitted for orthopedic evaluation.  I have been consulted for preoperative evaluation.    He denies any chest pain, shortness of breath, palpitations or edema.  He says prior to his fall he was active and would walk around the stores.      CXR 12/1/20  IMPRESSION:  Minimal likely atelectasis at the right lung base.    Previous Cardiac Testing:    Cardiac Catheterization 11/6/19  FINDINGS:  1. HEMODYNAMICS:  /20, /70, LVEDP 16-20  3.  CORONARY ANGIOGRAPHY:   Left main: Medium caliber LMCA which is normal and bifurcates to give the LAD and circumflex arteries.   LAD: Medium caliber LAD which is normal in the proximal segment. The LIMA touches down in the mid vessel.   Left circumflex: Medium caliber circumflex normal in the proximal segment, with 70-80% mid disease. The SVG touches down in the OM.   RCA: Medium caliber, dominant RCA which is normal in the proximal and mid segments. The SVG touches in the distal vessel.   LIMA to LAD widely patent  SVG to distal RCA: widely patent  SVG to OM: widely patent  4. Right Heart Pressures:   RA: 10  RV: 80/5  PA: 80/30 mean 40  PCWP: 20  Cardiac output 4.0 L/min, Cardiac Index 1.97 L/min/m2  SUMMARY: Widely patent bypass grafts. Multivessel native coronary disease. Elevated biventricular pressures  RECOMMENDATIONS: Diuresis, risk factor modification.     Stress Test 11/5/19  · Left ventricular ejection fraction is normal (Calculated EF = 52%).  · Myocardial perfusion imaging indicates a medium-sized, moderately severe area of ischemia located in the inferior wall and lateral wall.    Echocardiogram 11/5/19  · Left ventricular wall thickness is consistent with moderate concentric hypertrophy.  · Estimated EF = 62%.  · Left ventricular systolic function is normal.  · Left ventricular diastolic dysfunction (grade II) consistent with pseudonormalization.  · Right ventricular cavity is mildly dilated.  · Mild tricuspid valve regurgitation is present.  · Calculated right ventricular systolic pressure from tricuspid regurgitation is 68.0 mmHg.      Past Medical History:   Diagnosis Date   • Anxiety    • Chronic diastolic (congestive) heart failure (CMS/HCC)    • Chronic kidney disease     stones- had lithotripsy   • Colon polyp    • Coronary artery disease     CABG 7/2019   • Diabetes mellitus, type II (CMS/HCC)    • Erectile dysfunction    • H/O bone density study never   • Hyperlipidemia    • Hypersomnia    •  "Hypertension    • Kidney stone    • Orthostasis    • Periodic breathing    • Routine eye exam 2015   • Sleep apnea     bipap   • Stroke (CMS/[facility])     \"slight stroke\"       Past Surgical History:   Procedure Laterality Date   • CARDIAC CATHETERIZATION N/A 7/15/2019    Procedure: Coronary angiography;  Surgeon: Carrie Price MD;  Location:  [facility] CATH INVASIVE LOCATION;  Service: Cardiovascular   • CARDIAC CATHETERIZATION N/A 7/15/2019    Procedure: Left Heart Cath;  Surgeon: Carrie Price MD;  Location:  [facility] CATH INVASIVE LOCATION;  Service: Cardiovascular   • CARDIAC CATHETERIZATION N/A 7/15/2019    Procedure: Left ventriculography;  Surgeon: Carrie Price MD;  [facility];  Service: Cardiovascular   • CARDIAC CATHETERIZATION  7/15/2019    Procedure: Functional Flow [place];  Surgeon: Carrie Price MD;  [facility];  Service: Cardiovascular   • CARDIAC CATHETERIZATION N/A 11/6/2019    Procedure: Right and Left Heart Cath;  Surgeon: Mya Smith MD;  [facility];  Service: Cardiovascular   • CARDIAC CATHETERIZATION N/A 11/6/2019    Procedure: Coronary angiography;  Surgeon: May Smith MD;  [facility];  Service: Cardiovascular   • CATARACT EXTRACTION EXTRACAPSULAR W/ INTRAOCULAR LENS IMPLANTATION Bilateral    • COLONOSCOPY  01/2015    dr [email or web address]   • CORONARY ARTERY BYPASS GRAFT N/A 7/18/2019    Procedure: INTRAOPERATIVE SHOAIB; STERNOTOMY CORONARY ARTERY BYPASS x 3  USING LEFT INTERNAL MAMMARY ARTERY GRAFT UTILIZING ENDOSCOPICALLY HARVESTED RIGHT GREATER SAPHENOUS VEIN AND PRP.;  Surgeon: Bill Devi MD;  Location: [facility] MAIN OR;  Service: Cardiothoracic   • DENTAL PROCEDURE      3 surgeries inder implants   • HERNIA REPAIR      inguinal bilaterally   • KIDNEY STONE SURGERY      lithotripsy         Prior to Admission medications    Medication Sig Start Date End Date Taking? Authorizing " Provider   aspirin 81 MG EC tablet Take 81 mg by mouth Daily.   Yes Heri Rinaldi MD   carvedilol (COREG) 12.5 MG tablet Take 12.5 mg by mouth 2 (Two) Times a Day With Meals.   Yes Heri Rinaldi MD   Cholecalciferol (Vitamin D) 125 MCG (5000 UT) capsule capsule Take 5,000 Units by mouth Daily.   Yes Heri Rinaldi MD   furosemide (LASIX) 40 MG tablet Take 1 tablet by mouth 2 (Two) Times a Day. 9/17/19  Yes Michaela Hylton MD   gabapentin (NEURONTIN) 300 MG capsule Take 1 capsule by mouth Every Night.  Patient taking differently: Take 300 mg by mouth At Night As Needed. 12/1/20  Yes Vishal Adrian Jr., MD   Insulin Glargine (TOUJEO MAX SOLOSTAR SC) Inject 32 Units under the skin into the appropriate area as directed Daily.   Yes Heri Rinaldi MD   insulin lispro (HUMALOG) 100 UNIT/ML injection Inject 0-6 Units under the skin into the appropriate area as directed As Needed. PER SS 2 units for -250, 4 units for -300, 6 units for -350   Yes Heri Rinaldi MD   insulin lispro (humaLOG) 100 UNIT/ML injection Inject 8 Units under the skin into the appropriate area as directed Daily With Lunch.   Yes Heri Rinaldi MD   insulin lispro (humaLOG) 100 UNIT/ML injection Inject 10 Units under the skin into the appropriate area as directed Daily With Dinner.   Yes Heri Rinaldi MD   melatonin 3 MG tablet Take 3 mg by mouth Every Night.   Yes Heri Rinaldi MD   NON FORMULARY Take 5 mL by mouth Daily. Vitamin B Complex liquid   Yes Heri Rinaldi MD   pramipexole (MIRAPEX) 0.25 MG tablet Take 1 tablet by mouth 2 (Two) Times a Day As Needed (restless leg). 11/16/20  Yes Vishal Adrian Jr., MD   sildenafil (REVATIO) 20 MG tablet TAKE 1 TO 3 TABLETS BY MOUTH ONE HOUR PRIOR TO SEXUAL ACTIVITY. BEGIN WITH 1 TABLET AND MAX 3 TABLETS PER DAY  Patient taking differently: Take 20-60 mg by mouth Daily As Needed. ONE HOUR PRIOR TO SEXUAL ACTIVITY.  BEGIN WITH 1 TABLET AND MAX 3 TABLETS PER DAY 20  Yes Vishal Adrian Jr., MD   testosterone (ANDROGEL) 50 MG/5GM (1%) gel gel Place 50 mg on the skin as directed by provider 3 (Three) Times a Week.   Yes Provider, MD Heri       Allergies   Allergen Reactions   • Ace Inhibitors Angioedema   • Clonidine Derivatives Unknown - High Severity     Passes out   • Losartan Angioedema     Angioneurotic edema   • Amlodipine Swelling   • Ativan [Lorazepam] Irritability   • Minoxidil Confusion   • Tetracycline Other (See Comments)     bliisters in mouth         Social History     Socioeconomic History   • Marital status:      Spouse name: Not on file   • Number of children: Not on file   • Years of education: Not on file   • Highest education level: Not on file   Tobacco Use   • Smoking status: Former Smoker     Packs/day: 0.75     Years: 16.00     Pack years: 12.00     Quit date:      Years since quittin.9   • Smokeless tobacco: Never Used   • Tobacco comment: Start age:39/Stopping age:47, 3/4 PPD   Substance and Sexual Activity   • Alcohol use: No     Comment: caffeine use - 1 cup daily   • Drug use: No   • Sexual activity: Defer       Family History   Problem Relation Age of Onset   • Depression Mother    • Cancer Mother         uterine   • Arrhythmia Mother    • Heart disease Father    • Hypertension Father    • Kidney disease Father    • Thyroid disease Father    • Depression Sister    • Alcohol abuse Brother    • Alcohol abuse Other    • Alcohol abuse Other    • Lung disease Other    • Thyroid disease Other    • Glaucoma Other    • Heart attack Other    • Stroke Maternal Grandmother    • Stroke Paternal Grandmother        REVIEW OF SYSTEMS:   All other systems reviewed and negative.        Objective:     Vitals:    20 2306 20 0300 20 0700 20 1100   BP: 168/91 152/84 177/84 172/90   BP Location: Right arm Right arm Right arm Right arm   Patient Position: Lying Lying  "Lying Lying   Pulse: 86 82 84 84   Resp: 16 16 16 16   Temp: 97.5 °F (36.4 °C) 97.7 °F (36.5 °C) 96.8 °F (36 °C) 97.8 °F (36.6 °C)   TempSrc: Skin Skin Skin Skin   SpO2: 91% 96% 94% 92%   Weight: 94.3 kg (208 lb)      Height: 175.3 cm (69\")        Body mass index is 30.72 kg/m².    Intake/Output Summary (Last 24 hours) at 12/2/2020 1223  Last data filed at 12/2/2020 0900  Gross per 24 hour   Intake 240 ml   Output 350 ml   Net -110 ml       Constitutional: He is oriented to person, place, and time. He appears well-developed. He does not appear ill.   HENT:   Head: Normocephalic and atraumatic. Head is without contusion.   Right Ear: Hearing normal. No drainage.   Left Ear: Hearing normal. No drainage.   Nose: No nasal deformity. No epistaxis.   Eyes: Lids are normal. Right eye exhibits no exudate. Left eye exhibits no exudate.  Neck: No JVD present. Carotid bruit is not present. No tracheal deviation present. No thyroid mass and no thyromegaly present.   Cardiovascular: Normal rate, regular rhythm and normal heart sounds.    Pulses:       Posterior tibial pulses are 2+ on the right side, and 2+ on the left side.   Pulmonary/Chest: Effort normal and breath sounds normal.   Abdominal: Soft. Normal appearance and bowel sounds are normal. There is no tenderness.   Musculoskeletal: Normal range of motion.        Right shoulder: He exhibits no deformity.        Left shoulder: He exhibits no deformity.   Neurological: He is alert and oriented to person, place, and time. He has normal strength.   Skin: Skin is warm, dry and intact. No rash noted.   Psychiatric: He has a normal mood and affect. His behavior is normal. Thought content normal.   Vitals reviewed      Lab Review:     Results from last 7 days   Lab Units 12/02/20  0208 12/01/20  1556   SODIUM mmol/L 138 139   POTASSIUM mmol/L 4.7 4.5   CHLORIDE mmol/L 102 99   CO2 mmol/L 28.5 30.9*   BUN mg/dL 32* 29*   CREATININE mg/dL 1.21 1.23   CALCIUM mg/dL 8.9 9.2 "   BILIRUBIN mg/dL  --  0.6   ALK PHOS U/L  --  71   ALT (SGPT) U/L  --  20   AST (SGOT) U/L  --  17   GLUCOSE mg/dL 256* 312*     Results from last 7 days   Lab Units 12/02/20  0208   TROPONIN T ng/mL 0.017     Results from last 7 days   Lab Units 12/02/20  0208   WBC 10*3/mm3 15.76*   HEMOGLOBIN g/dL 14.9   HEMATOCRIT % 43.7   PLATELETS 10*3/mm3 203     Results from last 7 days   Lab Units 12/01/20  1556   INR  1.06   APTT seconds 31.3              EKG 12/1/20    Previous EKG 2/25/20          I personally viewed and interpreted the patient's EKG/Telemetry data.        Assessment and Plan:       1.  Coronary artery disease.  Status post bypass on July 18 of 2019 secondary to multivessel coronary artery disease.  Repeat heart catheterization in November 2019 showed patent bypass grafts with significant native vessel disease and elevated biventricular filling pressures.  2.  Postoperative atrial fibrillation.  CHADS2-Vasc score of 4.  This was a brief postoperative episode.  Not on anticoagulation or antiarrhythmic.  3.  Hypertension. Managed by nephrology  4.  Hyperlipidemia.  On atorvastatin  5.  Carotid artery disease with bilateral mild stenosis.  6.  Diabetes.  7.  Fall with hip fracture.  8.  Preoperative evaluation.  He has nonmodifiable risk factors.  He has normal LV function.  He is not having any ischemic symptoms.  His EKG is abnormal but unchanged.  I recommend proceeding with surgery without further cardiac testing.    Michaela Hylton MD  12/02/20  12:23 EST

## 2020-12-02 NOTE — H&P
Patient Name:  Dilip Verma  YOB: 1950  MRN:  [id number]  Admit Date:  12/1/2020  Patient Care Team:  Vishal Adrian Jr., MD as PCP - General (Internal Medicine)  Morris Cai MD as Consulting Physician (Sleep Medicine)  Michaela Hylton MD as Consulting Physician (Cardiology)  Hardy Banerjee MD as Consulting Physician (Ophthalmology)  Chula Christianson MD as Consulting Physician (Ophthalmology)  Jason Sherman MD (Endocrinology)  Perla Mayen MD as Consulting Physician (Nephrology)  Federico Hebert MD as Consulting Physician (Pulmonary Disease)      Chief Complaint   Patient presents with   • Hip Pain       Subjective     Mr. Verma is a 70 y.o. male with a history of CVA, HTN, DM2, HLD, COCO that presents to [facility] complaining of left hip pain. Patient reports that he was walking at the doctors office today when one of his shoes fell off causing him to trip and fall to his left side. He has history of CVA and spinal steonsis causing difficulty walking, he states he was using a cane and assistance of his wife today when he fell. Patient denies losing consciousness, though apparently did hit his head and left hand causing slight avulsion of first finger. Patient denies use of anticoagulation except aspirin daily. He reports constant pain to left hip, only relieved some with pain medication given in ED. Xray tonight shows proximal left femur fracture. CT head was negative. Labs done otherwise fairly unremarkable. Pre-op CXR shows minimal atelectasis at right lung base and EKG was sinus rhythm. Left hand xray was negative for fracture.     History of Present Illness    Past Medical History:   Diagnosis Date   • Anxiety    • Chronic diastolic (congestive) heart failure (CMS/HCC)    • Chronic kidney disease     stones- had lithotripsy   • Colon polyp    • Coronary artery disease     CABG 7/2019   • Diabetes mellitus, type II (CMS/HCC)    •  "Erectile dysfunction    • H/O bone density study never   • Hyperlipidemia    • Hypersomnia    • Hypertension    • Kidney stone    • Orthostasis    • Periodic breathing    • Routine eye exam 2015   • Sleep apnea     bipap   • Stroke (CMS/HCC)     \"slight stroke\"     Past Surgical History:   Procedure Laterality Date   • CARDIAC CATHETERIZATION N/A 7/15/2019    Procedure: Coronary angiography;  Surgeon: Carrie Price MD;  Location[facility] CATH INVASIVE LOCATION;  Service: Cardiovascular   • CARDIAC CATHETERIZATION N/A 7/15/2019    Procedure: Left Heart Cath;  Surgeon: Carrie Price MD;  Location: [facility] CATH INVASIVE LOCATION;  Service: Cardiovascular   • CARDIAC CATHETERIZATION N/A 7/15/2019    Procedure: Left ventriculography;  Surgeon: Carrie Price MD;  Location: [facility] CATH INVASIVE LOCATION;  Service: Cardiovascular   • CARDIAC CATHETERIZATION  7/15/2019    Procedure: Functional Flow Clay;  Surgeon: Carrie Price MD;  Location: [facility] CATH INVASIVE LOCATION;  Service: Cardiovascular   • CARDIAC CATHETERIZATION N/A 11/6/2019    Procedure: Right and Left Heart Cath;  Surgeon: Mya Smith MD;  Location: [facility] CATH [facility]VAS[facility] LOC[facility];  Service: Cardiovascular   • CARDIAC CATHETERIZATION N/A 11/6/2019    Procedure: Coronary angiography;  Surgeon: Mya Smith MD;  Location: [facility] CATH INVASIVE LOCATION;  Service: Cardiovascular   • CATARACT EXTRACTION EXTRACAPSULAR W/ INTRAOCULAR LENS IMPLANTATION Bilateral    • COLONOSCOPY  01/2015    dr [email or web address]   • CORONARY ARTERY BYPASS GRAFT N/A 7/18/2019    Procedure: INTRAOPERATIVE SHOAIB; STERNOTOMY CORONARY ARTERY BYPASS x 3  USING LEFT INTERNAL MAMMARY ARTERY GRAFT UTILIZING ENDOSCOPICALLY HARVESTED RIGHT GREATER SAPHENOUS VEIN AND PRP.;  Surgeon: Bill Devi MD;  Location: [facility] MAIN OR;  Service: Cardiothoracic   • DENTAL PROCEDURE      3 surgeries inder implants   • HERNIA REPAIR      inguinal bilaterally   • KIDNEY STONE SURGERY      lithotripsy "     Family History   Problem Relation Age of Onset   • Depression Mother    • Cancer Mother         uterine   • Arrhythmia Mother    • Heart disease Father    • Hypertension Father    • Kidney disease Father    • Thyroid disease Father    • Depression Sister    • Alcohol abuse Brother    • Alcohol abuse Other    • Alcohol abuse Other    • Lung disease Other    • Thyroid disease Other    • Glaucoma Other    • Heart attack Other    • Stroke Maternal Grandmother    • Stroke Paternal Grandmother      Social History     Tobacco Use   • Smoking status: Former Smoker     Packs/day: 0.75     Years: 16.00     Pack years: 12.00     Quit date:      Years since quittin.9   • Smokeless tobacco: Never Used   • Tobacco comment: Start age:39/Stopping age:47, 3/4 PPD   Substance Use Topics   • Alcohol use: No     Comment: caffeine use - 1 cup daily   • Drug use: No     Medications Prior to Admission   Medication Sig Dispense Refill Last Dose   • aspirin 81 MG EC tablet Take 81 mg by mouth Daily.   2020 at 2100   • carvedilol (COREG) 12.5 MG tablet Take 12.5 mg by mouth 2 (Two) Times a Day With Meals.      • Cholecalciferol (Vitamin D) 125 MCG (5000 UT) capsule capsule Take 5,000 Units by mouth Daily.   2020 at 0900   • furosemide (LASIX) 40 MG tablet Take 1 tablet by mouth 2 (Two) Times a Day. 60 tablet 11 2020 at 2100   • gabapentin (NEURONTIN) 300 MG capsule Take 1 capsule by mouth Every Night. (Patient taking differently: Take 300 mg by mouth At Night As Needed.) 30 capsule 0    • Insulin Glargine (TOUJEO MAX SOLOSTAR SC) Inject 32 Units under the skin into the appropriate area as directed Daily.      • insulin lispro (HUMALOG) 100 UNIT/ML injection Inject 0-6 Units under the skin into the appropriate area as directed As Needed. PER SS 2 units for -250, 4 units for -300, 6 units for -350      • insulin lispro (humaLOG) 100 UNIT/ML injection Inject 8 Units under the skin into the  appropriate area as directed Daily With Lunch.      • insulin lispro (humaLOG) 100 UNIT/ML injection Inject 10 Units under the skin into the appropriate area as directed Daily With Dinner.      • melatonin 3 MG tablet Take 3 mg by mouth Every Night.   11/30/2020 at 2100   • NON FORMULARY Take 5 mL by mouth Daily. Vitamin B Complex liquid      • pramipexole (MIRAPEX) 0.25 MG tablet Take 1 tablet by mouth 2 (Two) Times a Day As Needed (restless leg). 60 tablet 3    • sildenafil (REVATIO) 20 MG tablet TAKE 1 TO 3 TABLETS BY MOUTH ONE HOUR PRIOR TO SEXUAL ACTIVITY. BEGIN WITH 1 TABLET AND MAX 3 TABLETS PER DAY (Patient taking differently: Take 20-60 mg by mouth Daily As Needed. ONE HOUR PRIOR TO SEXUAL ACTIVITY. BEGIN WITH 1 TABLET AND MAX 3 TABLETS PER DAY) 20 tablet 0    • testosterone (ANDROGEL) 50 MG/5GM (1%) gel gel Place 50 mg on the skin as directed by provider 3 (Three) Times a Week.        Allergies:    Allergies   Allergen Reactions   • Ace Inhibitors Angioedema   • Clonidine Derivatives Unknown - High Severity     Passes out   • Losartan Angioedema     Angioneurotic edema   • Amlodipine Swelling   • Ativan [Lorazepam] Irritability   • Minoxidil Confusion   • Tetracycline Other (See Comments)     bliisters in mouth         Review of Systems   Constitutional: Negative.  Negative for chills and fever.   HENT: Negative.  Negative for congestion and sore throat.    Eyes: Negative.  Negative for visual disturbance.   Respiratory: Negative.  Negative for cough and shortness of breath.    Cardiovascular: Negative.  Negative for chest pain.   Gastrointestinal: Negative.  Negative for abdominal pain, nausea and vomiting.   Endocrine: Negative.    Genitourinary: Positive for frequency and urgency. Negative for dysuria.   Musculoskeletal: Positive for arthralgias (left hip). Negative for myalgias.   Skin: Negative.  Negative for color change and pallor.   Allergic/Immunologic: Negative.    Neurological: Negative.   Negative for dizziness, weakness and light-headedness.   Hematological: Negative.    Psychiatric/Behavioral: Negative.  Negative for agitation, behavioral problems and confusion.        Objective    Vital Signs  Temp:  [96.6 °F (35.9 °C)-97.1 °F (36.2 °C)] 97.1 °F (36.2 °C)  Heart Rate:  [76-84] 84  Resp:  [16-18] 16  BP: (130-214)/() 169/90  SpO2:  [94 %-95 %] 94 %  on  Flow (L/min):  [4] 4;   Device (Oxygen Therapy): nasal cannula  Body mass index is 30.72 kg/m².    Physical Exam  Vitals signs and nursing note reviewed.   Constitutional:       General: He is not in acute distress.     Appearance: He is obese.   HENT:      Head: Normocephalic and atraumatic.      Mouth/Throat:      Mouth: Mucous membranes are dry.   Eyes:      Extraocular Movements: Extraocular movements intact.   Neck:      Musculoskeletal: Normal range of motion and neck supple.   Cardiovascular:      Rate and Rhythm: Tachycardia present.      Pulses: Normal pulses.   Pulmonary:      Effort: Pulmonary effort is normal.      Breath sounds: Examination of the right-lower field reveals decreased breath sounds. Examination of the left-lower field reveals decreased breath sounds. Decreased breath sounds present.   Abdominal:      General: Abdomen is flat. There is distension.      Palpations: Abdomen is soft.   Musculoskeletal:         General: Swelling (mild edema BLE) present.      Comments: Decreased ROM left leg due to pain   Skin:     General: Skin is warm and dry.      Comments: Slight avulsion to first finger left hand   Neurological:      General: No focal deficit present.      Mental Status: He is alert and oriented to person, place, and time. Mental status is at baseline.   Psychiatric:         Mood and Affect: Mood normal.         Behavior: Behavior normal.         Thought Content: Thought content normal.         Judgment: Judgment normal.         Results Review:   I reviewed the patient's new clinical results including all labs and  xrays.    Lab Results (last 24 hours)     Procedure Component Value Units Date/Time    CBC & Differential [[id number]]  (Abnormal) Collected: 12/01/20 1556    Specimen: Blood Updated: 12/01/20 1607    Narrative:      The following orders were created for panel order CBC & Differential.  Procedure                               Abnormality         Status                     ---------                               -----------         ------                     CBC Auto Differential[[id number]]        Abnormal            Final result                 Please view results for these tests on the individual orders.    Comprehensive Metabolic Panel [[id number]]  (Abnormal) Collected: 12/01/20 1556    Specimen: Blood Updated: 12/01/20 1634     Glucose 312 mg/dL      BUN 29 mg/dL      Creatinine 1.23 mg/dL      Sodium 139 mmol/L      Potassium 4.5 mmol/L      Chloride 99 mmol/L      CO2 30.9 mmol/L      Calcium 9.2 mg/dL      Total Protein 6.9 g/dL      Albumin 3.90 g/dL      ALT (SGPT) 20 U/L      AST (SGOT) 17 U/L      Alkaline Phosphatase 71 U/L      Total Bilirubin 0.6 mg/dL      eGFR Non African Amer 58 mL/min/1.73      Globulin 3.0 gm/dL      A/G Ratio 1.3 g/dL      BUN/Creatinine Ratio 23.6     Anion Gap 9.1 mmol/L     Narrative:      GFR Normal >60  Chronic Kidney Disease <60  Kidney Failure <15      aPTT [[id number]]  (Normal) Collected: 12/01/20 1556    Specimen: Blood Updated: 12/01/20 1617     PTT 31.3 seconds     Protime-INR [[id number]]  (Normal) Collected: 12/01/20 1556    Specimen: Blood Updated: 12/01/20 1617     Protime 13.7 Seconds      INR 1.06    CBC Auto Differential [[id number]]  (Abnormal) Collected: 12/01/20 1556    Specimen: Blood Updated: 12/01/20 1607     WBC 10.36 10*3/mm3      RBC 5.01 10*6/mm3      Hemoglobin 15.5 g/dL      Hematocrit 45.7 %      MCV 91.2 fL      MCH 30.9 pg      MCHC 33.9 g/dL      RDW 13.0 %      RDW-SD 43.1 fl      MPV 10.3 fL      Platelets 197 10*3/mm3      Neutrophil % 82.7 %       Lymphocyte % 9.7 %      Monocyte % 5.2 %      Eosinophil % 1.8 %      Basophil % 0.2 %      Immature Grans % 0.4 %      Neutrophils, Absolute 8.56 10*3/mm3      Lymphocytes, Absolute 1.01 10*3/mm3      Monocytes, Absolute 0.54 10*3/mm3      Eosinophils, Absolute 0.19 10*3/mm3      Basophils, Absolute 0.02 10*3/mm3      Immature Grans, Absolute 0.04 10*3/mm3      nRBC 0.0 /100 WBC     COVID PRE-OP / PRE-PROCEDURE SCREENING ORDER (NO ISOLATION) - Swab, Nasopharynx [[id number]]  (Normal) Collected: 12/01/20 1929    Specimen: Swab from Nasopharynx Updated: 12/01/20 2045    Narrative:      The following orders were created for panel order COVID PRE-OP / PRE-PROCEDURE SCREENING ORDER (NO ISOLATION) - Swab, Nasopharynx.  Procedure                               Abnormality         Status                     ---------                               -----------         ------                     Respiratory Panel PCR w/...[[id number]]  Normal              Final result                 Please view results for these tests on the individual orders.    Respiratory Panel PCR w/COVID-19(SARS-CoV-2) RODRIGUE/MYESHA/PAO/PAD/COR/MAD/JOHNNIE In-House, NP Swab in UTM/VTM, 3-4 HR TAT - Swab, Nasopharynx [[id number]]  (Normal) Collected: 12/01/20 1929    Specimen: Swab from Nasopharynx Updated: 12/01/20 2045     ADENOVIRUS, PCR Not Detected     Coronavirus 229E Not Detected     Coronavirus HKU1 Not Detected     Coronavirus NL63 Not Detected     Coronavirus OC43 Not Detected     COVID19 Not Detected     Human Metapneumovirus Not Detected     Human Rhinovirus/Enterovirus Not Detected     Influenza A PCR Not Detected     Influenza B PCR Not Detected     Parainfluenza Virus 1 Not Detected     Parainfluenza Virus 2 Not Detected     Parainfluenza Virus 3 Not Detected     Parainfluenza Virus 4 Not Detected     RSV, PCR Not Detected     Bordetella pertussis pcr Not Detected     Bordetella parapertussis PCR Not Detected     Chlamydophila pneumoniae PCR Not  Detected     Mycoplasma pneumo by PCR Not Detected    Narrative:      Fact sheet for providers: https://docs.[employer]/wp-content/uploads/CTG5999-3868-LM0.1-EUA-Provider-Fact-Sheet-3.pdf    Fact sheet for patients: https://docs.[employer]/wp-content/uploads/MYO1772-3728-UL2.1-EUA-Patient-Fact-Sheet-1.pdf    POC Glucose Once [[id number]]  (Abnormal) Collected: 12/01/20 2201    Specimen: Blood Updated: 12/01/20 2202     Glucose 260 mg/dL           CT Head Without Contrast   Final Result   Stable chronic changes in the brain. No acute abnormality   identified on head CT.       This report was finalized on 12/1/2020 6:33 PM by Dr. Amador Denton M.D.          XR Hand 3+ View Left   Final Result       As described.               This report was finalized on 12/1/2020 6:05 PM by Dr. Gabe Zimmer M.D.          XR Chest 1 View   Final Result   Minimal likely atelectasis at the right lung base.       This report was finalized on 12/1/2020 6:01 PM by Dr. Gabe Zimmer M.D.          XR Hip With or Without Pelvis 2 - 3 View Left   Final Result       Proximal left femur fracture.       This report was finalized on 12/1/2020 5:59 PM by Dr. Gabe Zimmer M.D.          CT Lower Extremity Left Without Contrast    (Results Pending)     Assessment/Plan      Active Hospital Problems    Diagnosis  POA   • **Closed nondisplaced intertrochanteric fracture of left femur (CMS/[facility]) [S72.145A]  Yes   • History of stroke [Z86.73]  Not Applicable   • CKD (chronic kidney disease) stage 3, GFR 30-59 ml/min [N18.30]  Yes   • HTN (hypertension) [I10]  Yes   • Chronic diastolic CHF (congestive heart failure) (CMS/[facility]) [I50.32]  Yes   • CAD (coronary artery disease) [I25.10]  Yes   • Hyperlipidemia [E78.5]  Yes   • Diabetes mellitus, type II (CMS/[facility]) [E11.9]  Yes      Resolved Hospital Problems   No resolved problems to display.     Closed fracture left femur  -mechanical fall today, hit head without loss of  consciousness, CT head negative  -ortho consult  -NPO after midnight  -IVF overnight  -continue pain medication PRN  -pre-op CXR and EKG ok  -labs in AM    CKD3  -baseline renal function tonight  -avoid further nephrotoxins  -IVF overnight as per above  -recheck labs in AM    DM2  -moderate-high dose correctional factor insulin for meals  -a1c in AM  -continue home dose long acting insulin    HTN/CAD/HLD/CHF  -stable, will hold home dose aspirin and lasix in anticipation for surgery but may continue other home meds    VTE Ppx  -SCDs    CODE status  -full    I discussed the patients findings and my recommendations with patient.    LEIDY Bucio  [facility]  12/01/20  7:04 PM EST

## 2020-12-02 NOTE — PROGRESS NOTES
Name: Dilip Verma ADMIT: 2020   : 1950  PCP: Vishal Adrian Jr., MD    MRN: [id number] LOS: 1 days   AGE/SEX: 70 y.o. male  ROOM: [facility]     Subjective   Subjective   Patient appears relatively comfortable & in no apparent distress despite fracture of left hip--aggravated with movement & relieved with pain medication & immobility.  States urinary retention chronic yet voiding independently & currently denies dysuria.     Review of Systems   Constitutional: Negative for chills and fever.   HENT: Negative for congestion and rhinorrhea.    Respiratory: Negative for cough and shortness of breath.    Cardiovascular: Negative for chest pain and leg swelling.   Gastrointestinal: Negative for abdominal pain, constipation, diarrhea, nausea and vomiting.   Endocrine: Negative for polydipsia, polyphagia and polyuria.   Genitourinary: Positive for difficulty urinating (reports chronic urinary retention). Negative for dysuria.   Musculoskeletal: Positive for gait problem (2/2 LLE hip fracture). Negative for back pain and myalgias.   Skin: Negative for rash and wound.   Neurological: Negative for dizziness and weakness.   Psychiatric/Behavioral: Negative for confusion and hallucinations.        Objective   Objective   Vital Signs  Temp:  [96.8 °F (36 °C)-99.5 °F (37.5 °C)] 98.1 °F (36.7 °C)  Heart Rate:  [82-86] 86  Resp:  [16] 16  BP: (152-177)/(69-97) 177/86  SpO2:  [91 %-96 %] 94 %  on  Flow (L/min):  [4] 4;   Device (Oxygen Therapy): nasal cannula  Body mass index is 30.72 kg/m².     Physical Exam  Constitutional:       General: He is not in acute distress.     Appearance: He is obese.   HENT:      Head: Normocephalic and atraumatic.   Eyes:      Extraocular Movements: Extraocular movements intact.      Conjunctiva/sclera: Conjunctivae normal.   Neck:      Musculoskeletal: Normal range of motion and neck supple.   Cardiovascular:      Rate and Rhythm: Normal rate.      Heart sounds: Normal heart  sounds.   Pulmonary:      Effort: Pulmonary effort is normal. No respiratory distress.      Breath sounds: Normal breath sounds.   Abdominal:      General: Bowel sounds are normal. There is distension (chronic).      Palpations: Abdomen is soft.      Tenderness: There is no abdominal tenderness.   Musculoskeletal:         General: Tenderness (LLE proximal hip) and signs of injury (LLE proximal hip) present.      Right lower leg: Edema (trace pitting BLE) present.      Left lower leg: Edema present.   Skin:     General: Skin is warm and dry.   Neurological:      Mental Status: He is alert and oriented to person, place, and time. Mental status is at baseline.      Cranial Nerves: No cranial nerve deficit.   Psychiatric:         Behavior: Behavior normal.         Thought Content: Thought content normal.         Results Review     I reviewed the patient's new clinical results.  Results from last 7 days   Lab Units 12/02/20  0208 12/01/20  1556   WBC 10*3/mm3 15.76* 10.36   HEMOGLOBIN g/dL 14.9 15.5   PLATELETS 10*3/mm3 203 197     Results from last 7 days   Lab Units 12/02/20  0208 12/01/20  1556   SODIUM mmol/L 138 139   POTASSIUM mmol/L 4.7 4.5   CHLORIDE mmol/L 102 99   CO2 mmol/L 28.5 30.9*   BUN mg/dL 32* 29*   CREATININE mg/dL 1.21 1.23   GLUCOSE mg/dL 256* 312*   Estimated Creatinine Clearance: 63.4 mL/min (by C-G formula based on SCr of 1.21 mg/dL).  Results from last 7 days   Lab Units 12/01/20  1556   ALBUMIN g/dL 3.90   BILIRUBIN mg/dL 0.6   ALK PHOS U/L 71   AST (SGOT) U/L 17   ALT (SGPT) U/L 20     Results from last 7 days   Lab Units 12/02/20  0208 12/01/20  1556   CALCIUM mg/dL 8.9 9.2   ALBUMIN g/dL  --  3.90       COVID19   Date Value Ref Range Status   12/01/2020 Not Detected Not Detected - Ref. Range Final   07/24/2020 Not Detected Not Detected - Ref. Range Final     Hemoglobin A1C   Date/Time Value Ref Range Status   12/02/2020 0208 9.50 (H) 4.80 - 5.60 % Final     Glucose   Date/Time Value Ref Range  Status   12/02/2020 1114 148 (H) 70 - 130 mg/dL Final   12/02/2020 0603 221 (H) 70 - 130 mg/dL Final   12/01/2020 2201 260 (H) 70 - 130 mg/dL Final       CT Lower Extremity Left Without Contrast  Narrative: LEFT HIP CT WITHOUT CONTRAST     HISTORY: Left proximal femur fracture. Hip pain.     TECHNIQUE: Axial CT from the left hip to the left knee with multiplanar  reformatted images is provided and correlated with pelvis and left hip  x-rays acquired earlier in the evening.     Radiation dose reduction techniques were utilized, including automated  exposure control and exposure modulation based on body size.     FINDINGS: The visualized bones of the pelvis are intact. There is a  comminuted, reverse intertrochanteric proximal femur fracture with mild  varus angulation and impaction. There is fracture extension across the  femoral neck extending as far proximal as the left femoral head neck  junction superiorly. Posteriorly and posteroinferiorly, fracture planes  extend across the mid femoral neck. Distal fracture planes extend as far  as the posterior aspect of the proximal femoral shaft about 4 cm below  the level of the fragmented lesser trochanter. There is no evidence of  underlying bone lesion.     There is surrounding soft tissue edema and minimal adjacent hematoma, as  expected. Edema extends along the quadriceps musculature to the mid to  distal thigh level. No significant intramuscular hematoma is present.  The middle and distal portions of the femur are intact. The proximal  tibia and fibula and the patella are intact. There is no joint effusion  at the knee.     There is some mild underlying osteoarthritic change along the posterior  aspect of the left hip. Joint surfaces at the knee appear preserved.     No intrapelvic lesion is present.     Impression: Comminuted, mildly impacted and displaced reverse  intertrochanteric left proximal femur fracture with proximal fracture  extension is far cephalad as the  femoral head and neck junction  superiorly. There is expected surrounding soft tissue edema. There is no  fracture involving the middle or distal portions of the femoral shaft  nor is there evidence of acute injury around the knee.     This report was finalized on 12/2/2020 9:19 AM by Dr. Amador Denton M.D.       Scheduled Medications  carvedilol, 12.5 mg, Oral, BID With Meals  insulin glargine, 15 Units, Subcutaneous, Nightly  insulin lispro, 0-14 Units, Subcutaneous, TID AC  sodium chloride, 10 mL, Intravenous, Q12H  tamsulosin, 0.4 mg, Oral, Daily    Infusions   Diet  Diet Regular  NPO Diet       Assessment/Plan     Active Hospital Problems    Diagnosis  POA   • **Closed nondisplaced intertrochanteric fracture of left femur (CMS/HCC) [S72.145A]  Yes   • Urinary retention [R33.9]  Yes   • History of stroke [Z86.73]  Not Applicable   • CKD (chronic kidney disease) stage 3, GFR 30-59 ml/min [N18.30]  Yes   • HTN (hypertension) [I10]  Yes   • Chronic diastolic CHF (congestive heart failure) (CMS/HCC) [I50.32]  Yes   • CAD (coronary artery disease) [I25.10]  Yes   • Hyperlipidemia [E78.5]  Yes   • Diabetes mellitus, type II (CMS/HCC) [E11.9]  Yes      Resolved Hospital Problems   No resolved problems to display.       70 y.o. male admitted with Closed nondisplaced intertrochanteric fracture of left femur (CMS/HCC).      Closed nondisplaced intertrochanteric fracture of left femur (CMS/HCC).  COVID-19 not detected.  Ortho following--plan left hip IM nail on 12/3/2020.  NPO at midnight.     Diabetes mellitus, type II (CMS/HCC).  A1c 9.5, glucoscan improving.  Continue lantus 15 units nightly & moderate-high dose humalog SS for now.     Hyperlipidemia.  LFTs unremarkable.  ? Initiate statin therapy.    CAD (coronary artery disease) / Chronic diastolic CHF (congestive heart failure) (CMS/HCC).  Cardiology following for cardiac clearance--no indication to avoid surgery at this time / cleared by cardiology for surgery.   "Denies angina, no evidence of ACS.  EKG abnormal but unchanged.      HTN (hypertension).  BP elevated despite carvedilol 12.5 mg PO BID--suspect partially related to LLE pain from hip fracture. Opioid analgesic PRN provided.      History of stroke.  No evidence of residual weakness.     CKD (chronic kidney disease) stage 3, GFR 30-59 ml/min.  Serum creatinine appears at baseline.  Avoid nephrotoxins.     Urinary retention.  Patient reports chronic urinary retention & reportedly takes \"tamsulosin\" at home; however, noted medication not listed on home med list in Epic.  Given patient complaints of intermittent urinary retention during hospital admission plan to order tamsulosin 0.4 mg PO daily & monitor results.      · SCD for DVT prophylaxis.  · CPR  · Discussed with Dr. Blanca.  · Anticipate discharge TBD       LEIDY Cohn  [facility]  12/02/20  15:49 EST    I wore protective equipment throughout this patient encounter including a face mask, gloves and protective eyewear.  Hand hygiene was performed before donning protective equipment and after removal when leaving the room.        "

## 2020-12-02 NOTE — SIGNIFICANT NOTE
Orthopedic Surgery note:  Patient was scheduled for left hip IM nail surgery at 1 pm today.  Cardiology clearance Is still pending.  Surgery cancelled today.  Will be attempted to reschedule tomorrow with Dr. Ravi.  Will resume diet until midnight.  Niraj Goodwin MD

## 2020-12-03 ENCOUNTER — ANESTHESIA (OUTPATIENT)
Dept: PERIOP | Facility: HOSPITAL | Age: 70
End: 2020-12-03

## 2020-12-03 ENCOUNTER — APPOINTMENT (OUTPATIENT)
Dept: GENERAL RADIOLOGY | Facility: HOSPITAL | Age: 70
End: 2020-12-03

## 2020-12-03 ENCOUNTER — ANESTHESIA EVENT (OUTPATIENT)
Dept: PERIOP | Facility: HOSPITAL | Age: 70
End: 2020-12-03

## 2020-12-03 PROBLEM — N17.9 AKI (ACUTE KIDNEY INJURY): Status: ACTIVE | Noted: 2020-12-03

## 2020-12-03 LAB
ANION GAP SERPL CALCULATED.3IONS-SCNC: 9.7 MMOL/L (ref 5–15)
BUN SERPL-MCNC: 46 MG/DL (ref 8–23)
BUN/CREAT SERPL: 28.9 (ref 7–25)
CALCIUM SPEC-SCNC: 8.9 MG/DL (ref 8.6–10.5)
CHLORIDE SERPL-SCNC: 98 MMOL/L (ref 98–107)
CO2 SERPL-SCNC: 27.3 MMOL/L (ref 22–29)
CREAT SERPL-MCNC: 1.59 MG/DL (ref 0.76–1.27)
DEPRECATED RDW RBC AUTO: 43 FL (ref 37–54)
ERYTHROCYTE [DISTWIDTH] IN BLOOD BY AUTOMATED COUNT: 12.8 % (ref 12.3–15.4)
GFR SERPL CREATININE-BSD FRML MDRD: 43 ML/MIN/1.73
GLUCOSE BLDC GLUCOMTR-MCNC: 131 MG/DL (ref 70–130)
GLUCOSE BLDC GLUCOMTR-MCNC: 138 MG/DL (ref 70–130)
GLUCOSE BLDC GLUCOMTR-MCNC: 139 MG/DL (ref 70–130)
GLUCOSE BLDC GLUCOMTR-MCNC: 146 MG/DL (ref 70–130)
GLUCOSE SERPL-MCNC: 142 MG/DL (ref 65–99)
HCT VFR BLD AUTO: 41.2 % (ref 37.5–51)
HGB BLD-MCNC: 14 G/DL (ref 13–17.7)
MCH RBC QN AUTO: 31.3 PG (ref 26.6–33)
MCHC RBC AUTO-ENTMCNC: 34 G/DL (ref 31.5–35.7)
MCV RBC AUTO: 92.2 FL (ref 79–97)
PLATELET # BLD AUTO: 166 10*3/MM3 (ref 140–450)
PMV BLD AUTO: 10.8 FL (ref 6–12)
POTASSIUM SERPL-SCNC: 4.7 MMOL/L (ref 3.5–5.2)
RBC # BLD AUTO: 4.47 10*6/MM3 (ref 4.14–5.8)
SODIUM SERPL-SCNC: 135 MMOL/L (ref 136–145)
WBC # BLD AUTO: 15.17 10*3/MM3 (ref 3.4–10.8)

## 2020-12-03 PROCEDURE — 76000 FLUOROSCOPY <1 HR PHYS/QHP: CPT

## 2020-12-03 PROCEDURE — 85027 COMPLETE CBC AUTOMATED: CPT | Performed by: NURSE PRACTITIONER

## 2020-12-03 PROCEDURE — 0QS706Z REPOSITION LEFT UPPER FEMUR WITH INTRAMEDULLARY INTERNAL FIXATION DEVICE, OPEN APPROACH: ICD-10-PCS | Performed by: ORTHOPAEDIC SURGERY

## 2020-12-03 PROCEDURE — C1713 ANCHOR/SCREW BN/BN,TIS/BN: HCPCS | Performed by: ORTHOPAEDIC SURGERY

## 2020-12-03 PROCEDURE — 73502 X-RAY EXAM HIP UNI 2-3 VIEWS: CPT

## 2020-12-03 PROCEDURE — C1769 GUIDE WIRE: HCPCS | Performed by: ORTHOPAEDIC SURGERY

## 2020-12-03 PROCEDURE — 25010000002 PHENYLEPHRINE PER 1 ML: Performed by: STUDENT IN AN ORGANIZED HEALTH CARE EDUCATION/TRAINING PROGRAM

## 2020-12-03 PROCEDURE — 25010000002 PROPOFOL 10 MG/ML EMULSION: Performed by: STUDENT IN AN ORGANIZED HEALTH CARE EDUCATION/TRAINING PROGRAM

## 2020-12-03 PROCEDURE — 25010000002 NEOSTIGMINE PER 0.5 MG: Performed by: ANESTHESIOLOGY

## 2020-12-03 PROCEDURE — 25010000002 PHENYLEPHRINE PER 1 ML: Performed by: ANESTHESIOLOGY

## 2020-12-03 PROCEDURE — 25010000002 ONDANSETRON PER 1 MG: Performed by: ANESTHESIOLOGY

## 2020-12-03 PROCEDURE — 25010000002 FENTANYL CITRATE (PF) 100 MCG/2ML SOLUTION: Performed by: STUDENT IN AN ORGANIZED HEALTH CARE EDUCATION/TRAINING PROGRAM

## 2020-12-03 PROCEDURE — 2W6PXZZ TRACTION OF LEFT UPPER LEG: ICD-10-PCS | Performed by: ORTHOPAEDIC SURGERY

## 2020-12-03 PROCEDURE — 80048 BASIC METABOLIC PNL TOTAL CA: CPT | Performed by: NURSE PRACTITIONER

## 2020-12-03 PROCEDURE — 82962 GLUCOSE BLOOD TEST: CPT

## 2020-12-03 PROCEDURE — 25010000002 HYDROMORPHONE PER 4 MG: Performed by: NURSE PRACTITIONER

## 2020-12-03 PROCEDURE — 25010000002 HYDROMORPHONE PER 4 MG: Performed by: STUDENT IN AN ORGANIZED HEALTH CARE EDUCATION/TRAINING PROGRAM

## 2020-12-03 PROCEDURE — 25010000002 VANCOMYCIN 1 G RECONSTITUTED SOLUTION: Performed by: ORTHOPAEDIC SURGERY

## 2020-12-03 PROCEDURE — 25010000003 CEFAZOLIN IN DEXTROSE 2-4 GM/100ML-% SOLUTION: Performed by: ORTHOPAEDIC SURGERY

## 2020-12-03 DEVICE — ZNN CMN NAIL 11.5MMX40CM 125 L
Type: IMPLANTABLE DEVICE | Site: HIP | Status: FUNCTIONAL
Brand: ZIMMER® NATURAL NAIL® SYSTEM

## 2020-12-03 DEVICE — ZNN CMN LAG SCREW 10.5X105
Type: IMPLANTABLE DEVICE | Site: HIP | Status: FUNCTIONAL
Brand: ZIMMER® NATURAL NAIL® SYSTEM

## 2020-12-03 DEVICE — CABL CERCLG GTR COCR 1.8MM 63.5CM: Type: IMPLANTABLE DEVICE | Site: HIP | Status: FUNCTIONAL

## 2020-12-03 DEVICE — SCRW CORT FA FUL/THRD HEX3.5 TI 5X55MM: Type: IMPLANTABLE DEVICE | Site: HIP | Status: FUNCTIONAL

## 2020-12-03 DEVICE — IMPLANTABLE DEVICE
Type: IMPLANTABLE DEVICE | Site: HIP | Status: FUNCTIONAL
Brand: NATURAL NAIL®

## 2020-12-03 RX ORDER — HYDROCODONE BITARTRATE AND ACETAMINOPHEN 7.5; 325 MG/1; MG/1
1 TABLET ORAL ONCE AS NEEDED
Status: DISCONTINUED | OUTPATIENT
Start: 2020-12-03 | End: 2020-12-03 | Stop reason: HOSPADM

## 2020-12-03 RX ORDER — ONDANSETRON 2 MG/ML
INJECTION INTRAMUSCULAR; INTRAVENOUS AS NEEDED
Status: DISCONTINUED | OUTPATIENT
Start: 2020-12-03 | End: 2020-12-03 | Stop reason: SURG

## 2020-12-03 RX ORDER — NALOXONE HCL 0.4 MG/ML
0.2 VIAL (ML) INJECTION AS NEEDED
Status: DISCONTINUED | OUTPATIENT
Start: 2020-12-03 | End: 2020-12-03 | Stop reason: HOSPADM

## 2020-12-03 RX ORDER — SODIUM CHLORIDE 0.9 % (FLUSH) 0.9 %
3-10 SYRINGE (ML) INJECTION AS NEEDED
Status: DISCONTINUED | OUTPATIENT
Start: 2020-12-03 | End: 2020-12-03 | Stop reason: HOSPADM

## 2020-12-03 RX ORDER — FENTANYL CITRATE 50 UG/ML
INJECTION, SOLUTION INTRAMUSCULAR; INTRAVENOUS AS NEEDED
Status: DISCONTINUED | OUTPATIENT
Start: 2020-12-03 | End: 2020-12-03 | Stop reason: SURG

## 2020-12-03 RX ORDER — LIDOCAINE HYDROCHLORIDE 20 MG/ML
INJECTION, SOLUTION INFILTRATION; PERINEURAL AS NEEDED
Status: DISCONTINUED | OUTPATIENT
Start: 2020-12-03 | End: 2020-12-03 | Stop reason: SURG

## 2020-12-03 RX ORDER — HYDROMORPHONE HYDROCHLORIDE 1 MG/ML
0.5 INJECTION, SOLUTION INTRAMUSCULAR; INTRAVENOUS; SUBCUTANEOUS
Status: DISCONTINUED | OUTPATIENT
Start: 2020-12-03 | End: 2020-12-03 | Stop reason: HOSPADM

## 2020-12-03 RX ORDER — ONDANSETRON 2 MG/ML
4 INJECTION INTRAMUSCULAR; INTRAVENOUS ONCE AS NEEDED
Status: DISCONTINUED | OUTPATIENT
Start: 2020-12-03 | End: 2020-12-03 | Stop reason: HOSPADM

## 2020-12-03 RX ORDER — PROMETHAZINE HYDROCHLORIDE 25 MG/1
25 SUPPOSITORY RECTAL ONCE AS NEEDED
Status: DISCONTINUED | OUTPATIENT
Start: 2020-12-03 | End: 2020-12-03 | Stop reason: HOSPADM

## 2020-12-03 RX ORDER — FENTANYL CITRATE 50 UG/ML
50 INJECTION, SOLUTION INTRAMUSCULAR; INTRAVENOUS ONCE
Status: COMPLETED | OUTPATIENT
Start: 2020-12-03 | End: 2020-12-03

## 2020-12-03 RX ORDER — VANCOMYCIN HYDROCHLORIDE 1 G/20ML
INJECTION, POWDER, LYOPHILIZED, FOR SOLUTION INTRAVENOUS AS NEEDED
Status: DISCONTINUED | OUTPATIENT
Start: 2020-12-03 | End: 2020-12-03 | Stop reason: HOSPADM

## 2020-12-03 RX ORDER — SODIUM CHLORIDE, SODIUM LACTATE, POTASSIUM CHLORIDE, CALCIUM CHLORIDE 600; 310; 30; 20 MG/100ML; MG/100ML; MG/100ML; MG/100ML
9 INJECTION, SOLUTION INTRAVENOUS CONTINUOUS
Status: DISCONTINUED | OUTPATIENT
Start: 2020-12-03 | End: 2020-12-05

## 2020-12-03 RX ORDER — CEFAZOLIN SODIUM 2 G/100ML
2 INJECTION, SOLUTION INTRAVENOUS ONCE
Status: COMPLETED | OUTPATIENT
Start: 2020-12-03 | End: 2020-12-03

## 2020-12-03 RX ORDER — EPHEDRINE SULFATE 50 MG/ML
INJECTION, SOLUTION INTRAVENOUS AS NEEDED
Status: DISCONTINUED | OUTPATIENT
Start: 2020-12-03 | End: 2020-12-03 | Stop reason: SURG

## 2020-12-03 RX ORDER — FENTANYL CITRATE 50 UG/ML
50 INJECTION, SOLUTION INTRAMUSCULAR; INTRAVENOUS
Status: DISCONTINUED | OUTPATIENT
Start: 2020-12-03 | End: 2020-12-03 | Stop reason: HOSPADM

## 2020-12-03 RX ORDER — DIPHENHYDRAMINE HYDROCHLORIDE 50 MG/ML
12.5 INJECTION INTRAMUSCULAR; INTRAVENOUS
Status: DISCONTINUED | OUTPATIENT
Start: 2020-12-03 | End: 2020-12-03 | Stop reason: HOSPADM

## 2020-12-03 RX ORDER — ECHINACEA PURPUREA EXTRACT 125 MG
1 TABLET ORAL AS NEEDED
Status: DISCONTINUED | OUTPATIENT
Start: 2020-12-03 | End: 2020-12-07 | Stop reason: HOSPADM

## 2020-12-03 RX ORDER — SODIUM CHLORIDE 9 MG/ML
50 INJECTION, SOLUTION INTRAVENOUS CONTINUOUS
Status: DISCONTINUED | OUTPATIENT
Start: 2020-12-03 | End: 2020-12-04

## 2020-12-03 RX ORDER — ROCURONIUM BROMIDE 10 MG/ML
INJECTION, SOLUTION INTRAVENOUS AS NEEDED
Status: DISCONTINUED | OUTPATIENT
Start: 2020-12-03 | End: 2020-12-03 | Stop reason: SURG

## 2020-12-03 RX ORDER — CEFAZOLIN SODIUM 2 G/100ML
2 INJECTION, SOLUTION INTRAVENOUS EVERY 8 HOURS
Status: COMPLETED | OUTPATIENT
Start: 2020-12-04 | End: 2020-12-04

## 2020-12-03 RX ORDER — PROMETHAZINE HYDROCHLORIDE 25 MG/1
25 TABLET ORAL ONCE AS NEEDED
Status: DISCONTINUED | OUTPATIENT
Start: 2020-12-03 | End: 2020-12-03 | Stop reason: HOSPADM

## 2020-12-03 RX ORDER — MAGNESIUM HYDROXIDE 1200 MG/15ML
LIQUID ORAL AS NEEDED
Status: DISCONTINUED | OUTPATIENT
Start: 2020-12-03 | End: 2020-12-03 | Stop reason: HOSPADM

## 2020-12-03 RX ORDER — FLUMAZENIL 0.1 MG/ML
0.2 INJECTION INTRAVENOUS AS NEEDED
Status: DISCONTINUED | OUTPATIENT
Start: 2020-12-03 | End: 2020-12-03 | Stop reason: HOSPADM

## 2020-12-03 RX ORDER — EPHEDRINE SULFATE 50 MG/ML
5 INJECTION, SOLUTION INTRAVENOUS ONCE AS NEEDED
Status: DISCONTINUED | OUTPATIENT
Start: 2020-12-03 | End: 2020-12-03 | Stop reason: HOSPADM

## 2020-12-03 RX ORDER — DIPHENHYDRAMINE HCL 25 MG
25 CAPSULE ORAL
Status: DISCONTINUED | OUTPATIENT
Start: 2020-12-03 | End: 2020-12-03 | Stop reason: HOSPADM

## 2020-12-03 RX ORDER — GLYCOPYRROLATE 0.2 MG/ML
INJECTION INTRAMUSCULAR; INTRAVENOUS AS NEEDED
Status: DISCONTINUED | OUTPATIENT
Start: 2020-12-03 | End: 2020-12-03 | Stop reason: SURG

## 2020-12-03 RX ORDER — OXYCODONE AND ACETAMINOPHEN 7.5; 325 MG/1; MG/1
1 TABLET ORAL ONCE AS NEEDED
Status: DISCONTINUED | OUTPATIENT
Start: 2020-12-03 | End: 2020-12-03 | Stop reason: HOSPADM

## 2020-12-03 RX ORDER — LIDOCAINE HYDROCHLORIDE 10 MG/ML
0.5 INJECTION, SOLUTION EPIDURAL; INFILTRATION; INTRACAUDAL; PERINEURAL ONCE AS NEEDED
Status: DISCONTINUED | OUTPATIENT
Start: 2020-12-03 | End: 2020-12-03 | Stop reason: HOSPADM

## 2020-12-03 RX ORDER — PROPOFOL 10 MG/ML
VIAL (ML) INTRAVENOUS AS NEEDED
Status: DISCONTINUED | OUTPATIENT
Start: 2020-12-03 | End: 2020-12-03 | Stop reason: SURG

## 2020-12-03 RX ORDER — SODIUM CHLORIDE 0.9 % (FLUSH) 0.9 %
3 SYRINGE (ML) INJECTION EVERY 12 HOURS SCHEDULED
Status: DISCONTINUED | OUTPATIENT
Start: 2020-12-03 | End: 2020-12-03 | Stop reason: HOSPADM

## 2020-12-03 RX ADMIN — HYDROMORPHONE HYDROCHLORIDE 0.5 MG: 10 INJECTION INTRAMUSCULAR; INTRAVENOUS; SUBCUTANEOUS at 01:44

## 2020-12-03 RX ADMIN — PHENYLEPHRINE HYDROCHLORIDE 100 MCG: 10 INJECTION INTRAVENOUS at 16:20

## 2020-12-03 RX ADMIN — HYDROMORPHONE HYDROCHLORIDE 0.5 MG: 1 INJECTION, SOLUTION INTRAMUSCULAR; INTRAVENOUS; SUBCUTANEOUS at 19:35

## 2020-12-03 RX ADMIN — CARVEDILOL 12.5 MG: 12.5 TABLET, FILM COATED ORAL at 08:22

## 2020-12-03 RX ADMIN — EPHEDRINE SULFATE 10 MG: 50 INJECTION INTRAVENOUS at 16:15

## 2020-12-03 RX ADMIN — FENTANYL CITRATE 25 MCG: 50 INJECTION INTRAMUSCULAR; INTRAVENOUS at 14:30

## 2020-12-03 RX ADMIN — SODIUM CHLORIDE, PRESERVATIVE FREE 10 ML: 5 INJECTION INTRAVENOUS at 08:22

## 2020-12-03 RX ADMIN — PHENYLEPHRINE HYDROCHLORIDE 100 MCG: 10 INJECTION INTRAVENOUS at 16:12

## 2020-12-03 RX ADMIN — PHENYLEPHRINE HYDROCHLORIDE 100 MCG: 10 INJECTION INTRAVENOUS at 16:04

## 2020-12-03 RX ADMIN — LIDOCAINE HYDROCHLORIDE 100 MG: 20 INJECTION, SOLUTION INFILTRATION; PERINEURAL at 15:58

## 2020-12-03 RX ADMIN — CARVEDILOL 12.5 MG: 12.5 TABLET, FILM COATED ORAL at 22:39

## 2020-12-03 RX ADMIN — NEOSTIGMINE METHYLSULFATE 2.5 MG: 1 INJECTION INTRAMUSCULAR; INTRAVENOUS; SUBCUTANEOUS at 18:41

## 2020-12-03 RX ADMIN — SODIUM CHLORIDE, POTASSIUM CHLORIDE, SODIUM LACTATE AND CALCIUM CHLORIDE 9 ML/HR: 600; 310; 30; 20 INJECTION, SOLUTION INTRAVENOUS at 13:58

## 2020-12-03 RX ADMIN — FENTANYL CITRATE 50 MCG: 50 INJECTION, SOLUTION INTRAMUSCULAR; INTRAVENOUS at 19:30

## 2020-12-03 RX ADMIN — HYDROMORPHONE HYDROCHLORIDE 0.5 MG: 10 INJECTION INTRAMUSCULAR; INTRAVENOUS; SUBCUTANEOUS at 06:56

## 2020-12-03 RX ADMIN — FENTANYL CITRATE 50 MCG: 50 INJECTION INTRAMUSCULAR; INTRAVENOUS at 16:50

## 2020-12-03 RX ADMIN — PHENYLEPHRINE HYDROCHLORIDE 100 MCG: 10 INJECTION INTRAVENOUS at 16:08

## 2020-12-03 RX ADMIN — HYDROMORPHONE HYDROCHLORIDE 0.5 MG: 10 INJECTION INTRAMUSCULAR; INTRAVENOUS; SUBCUTANEOUS at 03:51

## 2020-12-03 RX ADMIN — PHENYLEPHRINE HYDROCHLORIDE 200 MCG: 10 INJECTION INTRAVENOUS at 16:24

## 2020-12-03 RX ADMIN — PHENYLEPHRINE HYDROCHLORIDE 100 MCG: 10 INJECTION INTRAVENOUS at 16:16

## 2020-12-03 RX ADMIN — GLYCOPYRROLATE 0.4 MG: 0.2 INJECTION INTRAMUSCULAR; INTRAVENOUS at 18:41

## 2020-12-03 RX ADMIN — PHENYLEPHRINE HYDROCHLORIDE 100 MCG: 10 INJECTION INTRAVENOUS at 18:54

## 2020-12-03 RX ADMIN — FENTANYL CITRATE 50 MCG: 50 INJECTION INTRAMUSCULAR; INTRAVENOUS at 15:58

## 2020-12-03 RX ADMIN — HYDROMORPHONE HYDROCHLORIDE 0.5 MG: 10 INJECTION INTRAMUSCULAR; INTRAVENOUS; SUBCUTANEOUS at 10:08

## 2020-12-03 RX ADMIN — CEFAZOLIN SODIUM 2 G: 2 INJECTION, SOLUTION INTRAVENOUS at 16:15

## 2020-12-03 RX ADMIN — PHENYLEPHRINE HYDROCHLORIDE 0.2 MCG/KG/MIN: 10 INJECTION, SOLUTION INTRAMUSCULAR; INTRAVENOUS; SUBCUTANEOUS at 17:39

## 2020-12-03 RX ADMIN — ONDANSETRON HYDROCHLORIDE 4 MG: 2 SOLUTION INTRAMUSCULAR; INTRAVENOUS at 18:28

## 2020-12-03 RX ADMIN — FENTANYL CITRATE 25 MCG: 50 INJECTION INTRAMUSCULAR; INTRAVENOUS at 14:05

## 2020-12-03 RX ADMIN — ROCURONIUM BROMIDE 50 MG: 10 INJECTION INTRAVENOUS at 15:58

## 2020-12-03 RX ADMIN — PROPOFOL 100 MG: 10 INJECTION, EMULSION INTRAVENOUS at 15:58

## 2020-12-03 RX ADMIN — EPHEDRINE SULFATE 10 MG: 50 INJECTION INTRAVENOUS at 16:23

## 2020-12-03 RX ADMIN — PHENYLEPHRINE HYDROCHLORIDE 100 MCG: 10 INJECTION INTRAVENOUS at 16:28

## 2020-12-03 NOTE — ANESTHESIA PROCEDURE NOTES
Airway  Urgency: elective    Date/Time: 12/3/2020 4:02 PM  Airway not difficult    General Information and Staff    Patient location during procedure: OR  Anesthesiologist: Zach Ortiz MD    Indications and Patient Condition  Indications for airway management: airway protection    Preoxygenated: yes  MILS maintained throughout  Mask difficulty assessment: 1 - vent by mask    Final Airway Details  Final airway type: endotracheal airway      Successful airway: ETT  Cuffed: yes   Successful intubation technique: direct laryngoscopy  Endotracheal tube insertion site: oral  Blade: CMAC  Blade size: D  ETT size (mm): 7.5  Cormack-Lehane Classification: grade IIb - view of arytenoids or posterior of glottis only  Placement verified by: chest auscultation and capnometry   Measured from: teeth  Number of attempts at approach: 1  Assessment: lips, teeth, and gum same as pre-op and atraumatic intubation

## 2020-12-03 NOTE — OP NOTE
FEMUR INTRAMEDULLARY NAILING/RODDING  Procedure Report    Patient Name:  Dilip Verma  YOB: 1950    Date of Surgery:  12/3/2020     Indications:  This is a 69 year old male who fell while leaving his primary care doctor's office.  He sustained a subtroch fracture with extension into the femoral neck.  He was admitted initially to the medical service due to multiple co-morbidities including diabetes.  Surgery was recommended to restore function and to promote early stabilization.  A intramedulary implant was recommended.    Pre-op Diagnosis:   Left subtroch femur fractue with extension into the femoral neck       Post-Op Diagnosis Codes:   same    Procedure/CPT® Codes:  Open redection internal fixation left subtroch femur fractue with extension in to the femoral neck    Procedure(s):  LEFT HIP INTRAMEDULLARY NAIL    Staff:  Surgeon(s):  Niraj Ravi MD    Assistant: Irais Martínez RN    Anesthesia: General    Estimated Blood Loss: 200 mL    Implants:    Implant Name Type Inv. Item Serial No.  Lot No. LRB No. Used Action   CABL CERCLG GTR COCR 1.8MM 63.5CM - [id number] Implant CABL CERCLG GTR COCR 1.8MM 63.5CM  [employer] [id number] Left 1 Implanted   NAIL FEM CEPH IMAN TI 40CM 11.5MM 125D LT - [id number] Implant NAIL FEM CEPH IMAN TI 40CM 11.5MM 125D LT  [employer] [id number] Left 1 Implanted   SCRW LAG CEPH TI 10.7A012YQ - [id number] Implant SCRW LAG CEPH TI 10.1Y792UM  [employer] [id number] Left 1 Implanted   SCRW MIREYA FA FUL/THRD HEX3.5 TI 5X52.5MM - [id number] Implant SCRW MIREYA FA FUL/THRD HEX3.5 TI 5X52.5MM  [employer] [id number] Left 1 Implanted   SCRW MIREYA FA FUL/THRD HEX3.5 TI 5X55MM - [id number] Implant SCRW MIREYA FA FUL/THRD HEX3.5 TI 5X55MM  [employer] [id number] Left 1 Implanted       Specimen:          None        Findings: As above    Complications: none    Description of Procedure: The patient was identified in the preoperative holding area.  The surgical site was  marked.  Patient was brought back to the operative suite by anesthesia where general endotracheal anesthesia was induced on the hospital bed without difficulty.  Patient was then placed supine on the [place] fracture table.  Both feet were placed in ski boots.  The left lower extremity was placed in inline traction.  The right lower extremity was lowered and adducted out of the operative field.  A surgical timeout was performed identifying patient characteristics.  Preoperative antibiotics were given.  Initial x-rays on the fracture table showed improved alignment of the subtrochanteric femur fracture.  The operative extremity was then prepped and draped in usual sterile manner.  An incision was made in line with the fracture site laterally using a 10 blade.  Dissection was carried down using Bovie electrocautery through the IT band and a mid vastus approach was used to expose the fracture site.  Hemostasis was achieved using electrocautery.  The fracture site was then exposed.  A .032 K wire was advanced laterally in the proximal fracture fragment into the femoral neck to maintain reduction of the femoral neck during reduction of the subtroches femur fracture.  A reduction cable was then placed around the subtroch fracture and tightened into place.  Reduction of the subtrochanteric fracture was noted on C arm imaging during tightening of the cable.  Once we felt we had stable reduction we began insertion of the intramedullary nail.  We made an incision 3 cm proximal to the tip of the greater troches laterally.  This was done using a 10 blade.  Dissection was carried down deep to the IT band.  The guidewire was then advanced through the starting site and into the femoral canal.  Its position was verified under AP and lateral C-arm imaging.  The canal was opened using the 16 mm drill over the guidewire.  A ball-tipped guidewire was then advanced to the knee.  Its position was verified to be center center on AP and  lateral imaging.  We then measured a size 400 nail.  We then reamed sequentially over the guidewire up to 13 mm to accommodate a size 11-1/2 nail.  A Reyna 11.5 x 40 left sided 125 degree nail was then selected and attached to the aiming arm and insertion device.  It was tapped into place over the guidewire.  We noted no significant displacement of the fracture with nail insertion.  We then placed the trocar sleeve through the 125 degree slot in the aiming arm.  We then advanced a guidewire through the femoral neck and head and verified its position to be center center on AP and lateral views.  We then drilled over the guidewire and inserted a 105 mm lag screw.  After insertion of the lag screw the 032 K wire was removed and we did not note any displacement of the femoral neck fracture.  We then compressed over the lag screw and then locked the nail into place proximally using the locking bolt.  The aiming arm attachment was removed from the nail.  Final AP and lateral images were taken and saved of the proximal hip.  We then turned our attention distally and using perfect [place] technique 2 locking screws were placed one in the dynamic position and one in the static position.  This was done through stab incisions laterally over the knee.  Final x-rays were then taken of the knee and saved.  The wounds were then irrigated with copious amounts normal saline.  A gram of vancomycin powder was placed in the fracture reduction incision.  The incisions were then closed in a layered fashion and staples were applied to the skin.  Patient was then removed carefully from the [place] fracture bed and placed on the hospital bed.  He was woken from anesthesia and brought back to recovery in stable condition.    He will be admitted to the floor postoperatively.  He will work with physical therapy.  He will be touchdown weightbearing to the left lower extremity.  We will start Lovenox tomorrow 40 mg daily for DVT prophylaxis.  He  will receive 24 hours of IV antibiotics for postop surgical prophylaxis.  We will see him back in the office in 2 to 3 weeks following discharge.    Assistant: Irais Martínez RN  was responsible for performing the following activities: Retraction, Suction, Irrigation, Suturing, Closing and Placing Dressing and their skilled assistance was necessary for the success of this case.    Niraj Ravi MD     Date: 12/3/2020  Time: 18:59 EST

## 2020-12-03 NOTE — PROGRESS NOTES
Name: Dilip Verma ADMIT: 2020   : 1950  PCP: Vishal Adrian Jr., MD    MRN: [id number] LOS: 2 days   AGE/SEX: 70 y.o. male  ROOM: [facility]     Subjective   Subjective   Patient appears relatively comfortable & in no apparent distress.  NPO.  Voices no new concerns.     Review of Systems   Constitutional: Negative for chills and fever.   Respiratory: Negative for cough and shortness of breath.    Gastrointestinal: Negative for abdominal pain, constipation, diarrhea, nausea and vomiting.   Genitourinary: Negative for difficulty urinating and dysuria.   Musculoskeletal: Positive for arthralgias (LLE) and gait problem (2/2 LLE pain).   Skin: Negative for rash and wound.   Psychiatric/Behavioral: Negative for confusion and hallucinations.        Objective   Objective   Vital Signs  Temp:  [97 °F (36.1 °C)-98.1 °F (36.7 °C)] 97.8 °F (36.6 °C)  Heart Rate:  [72-86] 79  Resp:  [16] 16  BP: (105-177)/(68-86) 105/70  SpO2:  [92 %-94 %] 94 %  on  Flow (L/min):  [4] 4;   Device (Oxygen Therapy): nasal cannula  Body mass index is 30.72 kg/m².     Physical Exam  Constitutional:       General: He is not in acute distress.     Appearance: He is obese.   HENT:      Head: Normocephalic and atraumatic.   Eyes:      Extraocular Movements: Extraocular movements intact.      Conjunctiva/sclera: Conjunctivae normal.   Neck:      Musculoskeletal: Normal range of motion and neck supple.   Cardiovascular:      Rate and Rhythm: Normal rate.      Heart sounds: Normal heart sounds.   Pulmonary:      Effort: No respiratory distress.      Breath sounds: Normal breath sounds.   Abdominal:      General: Bowel sounds are normal. There is distension.      Palpations: Abdomen is soft.      Tenderness: There is no abdominal tenderness.   Musculoskeletal:      Right lower leg: Edema (1+ BLE) present.      Left lower leg: Edema present.   Skin:     General: Skin is warm and dry.   Neurological:      Mental Status: He is alert.  Mental status is at baseline.      Cranial Nerves: No cranial nerve deficit.   Psychiatric:         Behavior: Behavior normal.         Thought Content: Thought content normal.         Results Review     I reviewed the patient's new clinical results.  Results from last 7 days   Lab Units 12/03/20  0537 12/02/20  0208 12/01/20  1556   WBC 10*3/mm3 15.17* 15.76* 10.36   HEMOGLOBIN g/dL 14.0 14.9 15.5   PLATELETS 10*3/mm3 166 203 197     Results from last 7 days   Lab Units 12/03/20  0537 12/02/20  0208 12/01/20  1556   SODIUM mmol/L 135* 138 139   POTASSIUM mmol/L 4.7 4.7 4.5   CHLORIDE mmol/L 98 102 99   CO2 mmol/L 27.3 28.5 30.9*   BUN mg/dL 46* 32* 29*   CREATININE mg/dL 1.59* 1.21 1.23   GLUCOSE mg/dL 142* 256* 312*   Estimated Creatinine Clearance: 48.3 mL/min (A) (by C-G formula based on SCr of 1.59 mg/dL (H)).  Results from last 7 days   Lab Units 12/01/20  1556   ALBUMIN g/dL 3.90   BILIRUBIN mg/dL 0.6   ALK PHOS U/L 71   AST (SGOT) U/L 17   ALT (SGPT) U/L 20     Results from last 7 days   Lab Units 12/03/20  0537 12/02/20  0208 12/01/20  1556   CALCIUM mg/dL 8.9 8.9 9.2   ALBUMIN g/dL  --   --  3.90       COVID19   Date Value Ref Range Status   12/01/2020 Not Detected Not Detected - Ref. Range Final   07/24/2020 Not Detected Not Detected - Ref. Range Final     Hemoglobin A1C   Date/Time Value Ref Range Status   12/02/2020 0208 9.50 (H) 4.80 - 5.60 % Final     Glucose   Date/Time Value Ref Range Status   12/03/2020 1158 146 (H) 70 - 130 mg/dL Final   12/03/2020 0643 138 (H) 70 - 130 mg/dL Final   12/02/2020 2059 130 70 - 130 mg/dL Final   12/02/2020 1644 139 (H) 70 - 130 mg/dL Final   12/02/2020 1114 148 (H) 70 - 130 mg/dL Final   12/02/2020 0603 221 (H) 70 - 130 mg/dL Final   12/01/2020 2201 260 (H) 70 - 130 mg/dL Final       CT Lower Extremity Left Without Contrast  Narrative: LEFT HIP CT WITHOUT CONTRAST     HISTORY: Left proximal femur fracture. Hip pain.     TECHNIQUE: Axial CT from the left hip to the  left knee with multiplanar  reformatted images is provided and correlated with pelvis and left hip  x-rays acquired earlier in the evening.     Radiation dose reduction techniques were utilized, including automated  exposure control and exposure modulation based on body size.     FINDINGS: The visualized bones of the pelvis are intact. There is a  comminuted, reverse intertrochanteric proximal femur fracture with mild  varus angulation and impaction. There is fracture extension across the  femoral neck extending as far proximal as the left femoral head neck  junction superiorly. Posteriorly and posteroinferiorly, fracture planes  extend across the mid femoral neck. Distal fracture planes extend as far  as the posterior aspect of the proximal femoral shaft about 4 cm below  the level of the fragmented lesser trochanter. There is no evidence of  underlying bone lesion.     There is surrounding soft tissue edema and minimal adjacent hematoma, as  expected. Edema extends along the quadriceps musculature to the mid to  distal thigh level. No significant intramuscular hematoma is present.  The middle and distal portions of the femur are intact. The proximal  tibia and fibula and the patella are intact. There is no joint effusion  at the knee.     There is some mild underlying osteoarthritic change along the posterior  aspect of the left hip. Joint surfaces at the knee appear preserved.     No intrapelvic lesion is present.     Impression: Comminuted, mildly impacted and displaced reverse  intertrochanteric left proximal femur fracture with proximal fracture  extension is far cephalad as the femoral head and neck junction  superiorly. There is expected surrounding soft tissue edema. There is no  fracture involving the middle or distal portions of the femoral shaft  nor is there evidence of acute injury around the knee.     This report was finalized on 12/2/2020 9:19 AM by Dr. Amador Denton M.D.       Scheduled  Medications  carvedilol, 12.5 mg, Oral, BID With Meals  insulin glargine, 15 Units, Subcutaneous, Nightly  insulin lispro, 0-14 Units, Subcutaneous, TID AC  sodium chloride, 10 mL, Intravenous, Q12H  tamsulosin, 0.4 mg, Oral, Daily    Infusions   Diet  NPO Diet       Assessment/Plan     Active Hospital Problems    Diagnosis  POA   • **Closed nondisplaced intertrochanteric fracture of left femur (CMS/[facility]) [S72.145A]  Yes   • Urinary retention [R33.9]  Yes   • History of stroke [Z86.73]  Not Applicable   • CKD (chronic kidney disease) stage 3, GFR 30-59 ml/min [N18.30]  Yes   • HTN (hypertension) [I10]  Yes   • Chronic diastolic CHF (congestive heart failure) (CMS/[facility]) [I50.32]  Yes   • CAD (coronary artery disease) [I25.10]  Yes   • Hyperlipidemia [E78.5]  Yes   • Diabetes mellitus, type II (CMS/[facility]) [E11.9]  Yes      Resolved Hospital Problems   No resolved problems to display.       70 y.o. male admitted with Closed nondisplaced intertrochanteric fracture of left femur (CMS/HCC).       ·   Closed nondisplaced intertrochanteric fracture of left femur (CMS/HCC).  COVID-19 not detected.  Ortho following--plan left hip IM nail on 12/3/2020.  NPO at midnight.   ·   Diabetes mellitus, type II (CMS/[facility]).  A1c 9.5, glucoscan acceptable.  Continue lantus 15 units nightly & moderate-high dose humalog SS for now.   ·   Hyperlipidemia.  LFTs unremarkable.  ? Initiate statin therapy.  ·   CAD (coronary artery disease) / Chronic diastolic CHF (congestive heart failure) (CMS/[facility]).  Cardiology following for cardiac clearance--no indication to avoid surgery at this time / cleared by cardiology for surgery.  Denies angina, no evidence of ACS.  EKG abnormal but unchanged.    ·   HTN (hypertension).  BP acceptable.  Opioid analgesic PRN provided.    ·   History of stroke.  No evidence of residual weakness.   ·   CKD (chronic kidney disease) stage 3, GFR 30-59 ml/min / AMENA (acute kidney injury) (CMS/[facility]).  Serum creatinine at baseline  "1.2 increased to 1.5 likely secondary to dehydration--prerenal AMENA, plan provide gentle IVF hydration & continue to monitor.  Avoid nephrotoxins.   ·   Urinary retention.  Patient reports chronic urinary retention & reportedly takes \"tamsulosin\" at home; however, noted medication not listed on home med list in Epic.  Given patient complaints of intermittent urinary retention during hospital admission--tamsulosin 0.4 mg PO daily ordered--appears to be effective.  Denies suprapubic pain / discomfort.       · SCD for DVT prophylaxis.  · CPR  · Discussed with Dr. Blanca.  · Anticipate discharge TBD      LEIDY Cohn  [facility]  12/03/20  12:17 EST    I wore protective equipment throughout this patient encounter including a face mask, gloves and protective eyewear.  Hand hygiene was performed before donning protective equipment and after removal when leaving the room.        "

## 2020-12-03 NOTE — CONSULTS
"Diabetes Education  Assessment/Teaching    Patient Name:  Dilip Verma  YOB: 1950  MRN: [id number]  Admit Date:  12/1/2020      Assessment Date:  12/3/2020    Most Recent Value   General Information    Height  175.3 cm (69\")   Height Method  Stated   Weight  94.3 kg (208 lb)   Weight Method  Stated   Pregnancy Assessment   Diabetes History   What type of diabetes do you have?  Type 2   Length of Diabetes Diagnosis  10 + years [States that he was diagnosed 10 years ago]   Current DM knowledge  good   Have you had diabetes education/teaching in the past?  no   Do you test your blood sugar at home?  yes   Frequency of checks  He has on a Freestyle marie   Have you had low blood sugar? (<70mg/dl)  yes   How often do you have low blood sugar?  rare   Have you had high blood sugar? (>140mg/dl)  yes   How often do you have high blood sugar?  occasionally   Education Preferences   Nutrition Information   Assessment Topics   Healthy Eating - Assessment  Needs education   Being Active - Assessment  N/A- unable to assess   Taking Medication - Assessment  Competent   Problem Solving - Assessment  Needs education   Reducing Risk - Assessment  Needs education   Healthy Coping - Assessment  Competent   Monitoring - Assessment  Competent   DM Goals            Most Recent Value   DM Education Needs   Meter  Has own   Meter Type  Freestyle [Freestyle Marie]   Blood Glucose Target Range  Fasting  and less than 180 2 hours after a meal   Medication  Insulin, Pen   Problem Solving  Hypoglycemia, Hyperglycemia, Sick days, Signs, Symptoms, Treatment   Reducing Risks  A1C testing   Healthy Eating  RD consult   Healthy Coping  Appropriate   Discharge Plan  Home   Motivation  Not interested   Teaching Method  Explanation, Discussion, Handouts            Other Comments:          Electronically signed by:  Deb Hidalgo RN  12/03/20 12:27 EST  "

## 2020-12-03 NOTE — ANESTHESIA PREPROCEDURE EVALUATION
" Anesthesia Evaluation     Patient summary reviewed and Nursing notes reviewed   history of anesthetic complications: difficult airway  NPO Solid Status: > 8 hours  NPO Liquid Status: > 2 hours           Airway   Mallampati: III  TM distance: >3 FB  Neck ROM: full  Difficult intubation highly probable and Small opening  Comment: Prior intubation:     \"First attempt grade 3 view unabl to visualize glottis, second attemp passed eschmann with grade 3 view  Unable to advance ETT over eschmann,  3rd attempt with D blade CMAC grade 2 B view ETT placed without comp.  Recommend using CMAC for future intubations\"  Dental    (+) implants    Pulmonary     breath sounds clear to auscultation  (+) sleep apnea,   Cardiovascular   Exercise tolerance: good (4-7 METS)    ECG reviewed  Beta blocker given within 24 hours of surgery  Rhythm: regular  Rate: normal    (+) hypertension, CAD, CABG, CHF Diastolic >=55%, hyperlipidemia,     ROS comment: Echo reviewed: Normal EF, stage II DD    Neuro/Psych  (+) CVA residual symptoms,     GI/Hepatic/Renal/Endo    (+) obesity,   renal disease CRI, diabetes mellitus type 2 using insulin,     Musculoskeletal     Abdominal    Substance History      OB/GYN          Other                        Anesthesia Plan    ASA 4     general     intravenous induction     Anesthetic plan, all risks, benefits, and alternatives have been provided, discussed and informed consent has been obtained with: patient.    Plan discussed with CRNA.      "

## 2020-12-03 NOTE — DISCHARGE INSTRUCTIONS
Keep dressings clean/dry.    You may remove dressing and leave open to air in 5 days.  If wounds continue to drain then change once daily with dry gauze and tape until incisions are dry.    You may not bear full weight to the operative leg.  You may touch down your left foot only for balance.    Follow up with Dr. Ravi in 2 weeks.

## 2020-12-04 ENCOUNTER — APPOINTMENT (OUTPATIENT)
Dept: GENERAL RADIOLOGY | Facility: HOSPITAL | Age: 70
End: 2020-12-04

## 2020-12-04 PROBLEM — D62 ACUTE POSTHEMORRHAGIC ANEMIA: Status: ACTIVE | Noted: 2020-12-04

## 2020-12-04 LAB
ANION GAP SERPL CALCULATED.3IONS-SCNC: 10.5 MMOL/L (ref 5–15)
BUN SERPL-MCNC: 52 MG/DL (ref 8–23)
BUN/CREAT SERPL: 38.5 (ref 7–25)
CALCIUM SPEC-SCNC: 8.1 MG/DL (ref 8.6–10.5)
CHLORIDE SERPL-SCNC: 99 MMOL/L (ref 98–107)
CO2 SERPL-SCNC: 26.5 MMOL/L (ref 22–29)
CREAT SERPL-MCNC: 1.35 MG/DL (ref 0.76–1.27)
DEPRECATED RDW RBC AUTO: 43.8 FL (ref 37–54)
ERYTHROCYTE [DISTWIDTH] IN BLOOD BY AUTOMATED COUNT: 12.9 % (ref 12.3–15.4)
GFR SERPL CREATININE-BSD FRML MDRD: 52 ML/MIN/1.73
GLUCOSE BLDC GLUCOMTR-MCNC: 147 MG/DL (ref 70–130)
GLUCOSE BLDC GLUCOMTR-MCNC: 167 MG/DL (ref 70–130)
GLUCOSE BLDC GLUCOMTR-MCNC: 187 MG/DL (ref 70–130)
GLUCOSE BLDC GLUCOMTR-MCNC: 210 MG/DL (ref 70–130)
GLUCOSE SERPL-MCNC: 161 MG/DL (ref 65–99)
HCT VFR BLD AUTO: 31.5 % (ref 37.5–51)
HGB BLD-MCNC: 10.9 G/DL (ref 13–17.7)
MCH RBC QN AUTO: 32.2 PG (ref 26.6–33)
MCHC RBC AUTO-ENTMCNC: 34.6 G/DL (ref 31.5–35.7)
MCV RBC AUTO: 92.9 FL (ref 79–97)
PLATELET # BLD AUTO: 159 10*3/MM3 (ref 140–450)
PMV BLD AUTO: 11.1 FL (ref 6–12)
POTASSIUM SERPL-SCNC: 4.8 MMOL/L (ref 3.5–5.2)
RBC # BLD AUTO: 3.39 10*6/MM3 (ref 4.14–5.8)
SODIUM SERPL-SCNC: 136 MMOL/L (ref 136–145)
WBC # BLD AUTO: 15.81 10*3/MM3 (ref 3.4–10.8)

## 2020-12-04 PROCEDURE — 82962 GLUCOSE BLOOD TEST: CPT

## 2020-12-04 PROCEDURE — 63710000001 INSULIN LISPRO (HUMAN) PER 5 UNITS: Performed by: ORTHOPAEDIC SURGERY

## 2020-12-04 PROCEDURE — 25010000002 ENOXAPARIN PER 10 MG: Performed by: ORTHOPAEDIC SURGERY

## 2020-12-04 PROCEDURE — 97530 THERAPEUTIC ACTIVITIES: CPT

## 2020-12-04 PROCEDURE — 85027 COMPLETE CBC AUTOMATED: CPT | Performed by: ORTHOPAEDIC SURGERY

## 2020-12-04 PROCEDURE — 97161 PT EVAL LOW COMPLEX 20 MIN: CPT

## 2020-12-04 PROCEDURE — 63710000001 INSULIN GLARGINE PER 5 UNITS: Performed by: ORTHOPAEDIC SURGERY

## 2020-12-04 PROCEDURE — 99232 SBSQ HOSP IP/OBS MODERATE 35: CPT | Performed by: INTERNAL MEDICINE

## 2020-12-04 PROCEDURE — 25010000003 CEFAZOLIN IN DEXTROSE 2-4 GM/100ML-% SOLUTION: Performed by: ORTHOPAEDIC SURGERY

## 2020-12-04 PROCEDURE — 71045 X-RAY EXAM CHEST 1 VIEW: CPT

## 2020-12-04 PROCEDURE — 25010000002 ONDANSETRON PER 1 MG: Performed by: ORTHOPAEDIC SURGERY

## 2020-12-04 PROCEDURE — 80048 BASIC METABOLIC PNL TOTAL CA: CPT | Performed by: ORTHOPAEDIC SURGERY

## 2020-12-04 RX ORDER — TAMSULOSIN HYDROCHLORIDE 0.4 MG/1
0.8 CAPSULE ORAL DAILY
Status: DISCONTINUED | OUTPATIENT
Start: 2020-12-05 | End: 2020-12-07 | Stop reason: HOSPADM

## 2020-12-04 RX ORDER — AMOXICILLIN 250 MG
2 CAPSULE ORAL 2 TIMES DAILY
Status: DISCONTINUED | OUTPATIENT
Start: 2020-12-04 | End: 2020-12-07 | Stop reason: HOSPADM

## 2020-12-04 RX ADMIN — INSULIN LISPRO 3 UNITS: 100 INJECTION, SOLUTION INTRAVENOUS; SUBCUTANEOUS at 08:38

## 2020-12-04 RX ADMIN — INSULIN GLARGINE 15 UNITS: 100 INJECTION, SOLUTION SUBCUTANEOUS at 21:19

## 2020-12-04 RX ADMIN — TAMSULOSIN HYDROCHLORIDE 0.4 MG: 0.4 CAPSULE ORAL at 10:38

## 2020-12-04 RX ADMIN — CARVEDILOL 12.5 MG: 12.5 TABLET, FILM COATED ORAL at 18:40

## 2020-12-04 RX ADMIN — SODIUM CHLORIDE, PRESERVATIVE FREE 10 ML: 5 INJECTION INTRAVENOUS at 21:20

## 2020-12-04 RX ADMIN — SODIUM CHLORIDE, PRESERVATIVE FREE 10 ML: 5 INJECTION INTRAVENOUS at 08:38

## 2020-12-04 RX ADMIN — CEFAZOLIN SODIUM 2 G: 2 INJECTION, SOLUTION INTRAVENOUS at 10:38

## 2020-12-04 RX ADMIN — HYDROCODONE BITARTRATE AND ACETAMINOPHEN 1 TABLET: 5; 325 TABLET ORAL at 02:05

## 2020-12-04 RX ADMIN — HYDROCODONE BITARTRATE AND ACETAMINOPHEN 1 TABLET: 5; 325 TABLET ORAL at 10:38

## 2020-12-04 RX ADMIN — HYDROCODONE BITARTRATE AND ACETAMINOPHEN 1 TABLET: 5; 325 TABLET ORAL at 18:40

## 2020-12-04 RX ADMIN — CEFAZOLIN SODIUM 2 G: 2 INJECTION, SOLUTION INTRAVENOUS at 02:00

## 2020-12-04 RX ADMIN — INSULIN LISPRO 3 UNITS: 100 INJECTION, SOLUTION INTRAVENOUS; SUBCUTANEOUS at 12:55

## 2020-12-04 RX ADMIN — ENOXAPARIN SODIUM 40 MG: 100 INJECTION SUBCUTANEOUS at 08:38

## 2020-12-04 RX ADMIN — ONDANSETRON 4 MG: 2 INJECTION INTRAMUSCULAR; INTRAVENOUS at 08:41

## 2020-12-04 RX ADMIN — CARVEDILOL 12.5 MG: 12.5 TABLET, FILM COATED ORAL at 10:38

## 2020-12-04 NOTE — PROGRESS NOTES
Name: Dilip Verma ADMIT: 2020   : 1950  PCP: Vishal Adrian Jr., MD    MRN: [id number] LOS: 3 days   AGE/SEX: 70 y.o. male  ROOM: [facility]     Subjective   Subjective   Patient appears relatively comfortable & in no apparent distress.  NPO.  Voices no new concerns.     Review of Systems   Constitutional: Negative for chills and fever.   Respiratory: Negative for cough and shortness of breath.    Gastrointestinal: Negative for abdominal pain, constipation, diarrhea, nausea and vomiting.   Genitourinary: Negative for difficulty urinating and dysuria.   Musculoskeletal: Negative for arthralgias and gait problem.   Skin: Negative for rash and wound.   Psychiatric/Behavioral: Negative for confusion and hallucinations.        Objective   Objective   Vital Signs  Temp:  [97.3 °F (36.3 °C)-99.1 °F (37.3 °C)] 97.8 °F (36.6 °C)  Heart Rate:  [75-80] 78  Resp:  [16] 16  BP: (102-157)/(58-85) 120/65  SpO2:  [90 %-97 %] 91 %  on  Flow (L/min):  [4] 4;   Device (Oxygen Therapy): nasal cannula  Body mass index is 30.72 kg/m².     Physical Exam  Constitutional:       General: He is not in acute distress.     Appearance: He is obese.   HENT:      Head: Normocephalic and atraumatic.   Eyes:      Extraocular Movements: Extraocular movements intact.      Conjunctiva/sclera: Conjunctivae normal.   Neck:      Musculoskeletal: Normal range of motion and neck supple.   Cardiovascular:      Rate and Rhythm: Normal rate.      Heart sounds: Normal heart sounds.   Pulmonary:      Effort: No respiratory distress.      Breath sounds: Normal breath sounds.   Abdominal:      General: There is distension.      Palpations: Abdomen is soft.      Tenderness: There is no abdominal tenderness.      Comments: Hypoactive BS   Musculoskeletal:      Right lower leg: Edema (1+ BLE) present.      Left lower leg: Edema present.   Skin:     General: Skin is warm and dry.   Neurological:      Mental Status: He is alert. Mental status is at  baseline.      Cranial Nerves: No cranial nerve deficit.      Comments: Non-focal on exam   Psychiatric:         Behavior: Behavior normal.         Thought Content: Thought content normal.      Comments: Slow to respond with answer         Results Review     I reviewed the patient's new clinical results.  Results from last 7 days   Lab Units 12/04/20 0538 12/03/20 0537 12/02/20 0208 12/01/20  1556   WBC 10*3/mm3 15.81* 15.17* 15.76* 10.36   HEMOGLOBIN g/dL 10.9* 14.0 14.9 15.5   PLATELETS 10*3/mm3 159 166 203 197     Results from last 7 days   Lab Units 12/04/20  0538 12/03/20  0537 12/02/20 0208 12/01/20  1556   SODIUM mmol/L 136 135* 138 139   POTASSIUM mmol/L 4.8 4.7 4.7 4.5   CHLORIDE mmol/L 99 98 102 99   CO2 mmol/L 26.5 27.3 28.5 30.9*   BUN mg/dL 52* 46* 32* 29*   CREATININE mg/dL 1.35* 1.59* 1.21 1.23   GLUCOSE mg/dL 161* 142* 256* 312*   Estimated Creatinine Clearance: 56.9 mL/min (A) (by C-G formula based on SCr of 1.35 mg/dL (H)).  Results from last 7 days   Lab Units 12/01/20  1556   ALBUMIN g/dL 3.90   BILIRUBIN mg/dL 0.6   ALK PHOS U/L 71   AST (SGOT) U/L 17   ALT (SGPT) U/L 20     Results from last 7 days   Lab Units 12/04/20 0538 12/03/20 0537 12/02/20 0208 12/01/20  1556   CALCIUM mg/dL 8.1* 8.9 8.9 9.2   ALBUMIN g/dL  --   --   --  3.90       COVID19   Date Value Ref Range Status   12/01/2020 Not Detected Not Detected - Ref. Range Final   07/24/2020 Not Detected Not Detected - Ref. Range Final     Hemoglobin A1C   Date/Time Value Ref Range Status   12/02/2020 0208 9.50 (H) 4.80 - 5.60 % Final     Glucose   Date/Time Value Ref Range Status   12/04/2020 1050 187 (H) 70 - 130 mg/dL Final   12/04/2020 0629 167 (H) 70 - 130 mg/dL Final   12/03/2020 1957 139 (H) 70 - 130 mg/dL Final   12/03/2020 1356 131 (H) 70 - 130 mg/dL Final   12/03/2020 1158 146 (H) 70 - 130 mg/dL Final   12/03/2020 0643 138 (H) 70 - 130 mg/dL Final   12/02/2020 2059 130 70 - 130 mg/dL Final       XR Hip With or Without  Pelvis 2 - 3 View Left  LEFT HIP/FEMUR     HISTORY: IM nail placement left femur.     FINDINGS: Six views were obtained for intraoperative localization and  control during gamma nail fixation of the left proximal femur. Imaging  demonstrates 2 distal interlocking screws and a proximal Cerclage wire.  The fluoroscopy time was 2 minutes 35 seconds.     This report was finalized on 12/3/2020 7:21 PM by Dr. Zechariah Falcon M.D.     FL C Arm During Surgery  This procedure was auto-finalized with no dictation required.    Scheduled Medications  carvedilol, 12.5 mg, Oral, BID With Meals  enoxaparin, 40 mg, Subcutaneous, Q24H  insulin glargine, 15 Units, Subcutaneous, Nightly  insulin lispro, 0-14 Units, Subcutaneous, TID AC  sodium chloride, 10 mL, Intravenous, Q12H  tamsulosin, 0.4 mg, Oral, Daily    Infusions  lactated ringers, 9 mL/hr, Last Rate: 9 mL/hr (12/03/20 1358)  sodium chloride, 50 mL/hr    Diet  Diet Regular; Consistent Carbohydrate       Assessment/Plan     Active Hospital Problems    Diagnosis  POA   • **Closed nondisplaced intertrochanteric fracture of left femur (CMS/[facility]) [S72.145A]  Yes   • AMENA (acute kidney injury) (CMS/[facility]) [N17.9]  Unknown   • Urinary retention [R33.9]  Yes   • History of stroke [Z86.73]  Not Applicable   • CKD (chronic kidney disease) stage 3, GFR 30-59 ml/min [N18.30]  Yes   • HTN (hypertension) [I10]  Yes   • Chronic diastolic CHF (congestive heart failure) (CMS/[facility]) [I50.32]  Yes   • CAD (coronary artery disease) [I25.10]  Yes   • Hyperlipidemia [E78.5]  Yes   • Diabetes mellitus, type II (CMS/[facility]) [E11.9]  Yes      Resolved Hospital Problems   No resolved problems to display.       70 y.o. male admitted with Closed nondisplaced intertrochanteric fracture of left femur (CMS/HCC).       ·   Closed nondisplaced intertrochanteric fracture of left femur (CMS/HCC).  COVID-19 not detected.  Ortho following--plan left hip IM nail on 12/3/2020.  NPO at midnight.   ·   Diabetes mellitus,  "type II (CMS/[facility]).  A1c 9.5, glucoscan acceptable.  Continue lantus 15 units nightly & moderate-high dose humalog SS for now.   ·   Hyperlipidemia.  LFTs unremarkable.  ? Initiate statin therapy.  ·   CAD (coronary artery disease) / Chronic diastolic CHF (congestive heart failure) (CMS/[facility]).  Cardiology following for cardiac clearance--no indication to avoid surgery at this time / cleared by cardiology for surgery.  Denies angina, no evidence of ACS.  EKG abnormal but unchanged.    ·   HTN (hypertension).  BP acceptable.  Opioid analgesic PRN provided.    ·   History of stroke.  No evidence of residual weakness.   ·   CKD (chronic kidney disease) stage 3, GFR 30-59 ml/min / AMENA (acute kidney injury) (CMS/[facility]).  Serum creatinine at baseline 1.2 increased to 1.5 likely secondary to dehydration--prerenal AMENA, sCr 1.35 improving s/p gentle IVF hydration--plan to stop IVFs for now given BLE 1+ edema & increased oxygen demands.  Avoid nephrotoxins.   ·   Hypoxia.  Ordering CXR to evaluate for vascular congestion / pleural effusion.  Currently receiving 4L NC & no oxygen needs at home per patient's spouse.  ·   Urinary retention.  Patient reports chronic urinary retention & reportedly takes \"tamsulosin\" at home; however, noted medication not listed on home med list in [facility].  Given patient complaints of intermittent urinary retention during hospital admission--increased tamsulosin 0.8 for now.  Denies suprapubic pain / discomfort.  Ordering PVR bladder scan to be sure urinary bladder adequately emptying.      · SCD for DVT prophylaxis.  · CPR  · Discussed with Dr. Blanca.  · Anticipate discharge TBD / CCP following--plan LEIDY Chamberlain  [facility]  12/04/20  15:40 EST    I wore protective equipment throughout this patient encounter including a face mask, gloves and protective eyewear.  Hand hygiene was performed before donning protective equipment and after removal when leaving " the room.

## 2020-12-04 NOTE — PROGRESS NOTES
"Patient Name: Dilip Verma  :1950  70 y.o.      Patient Care Team:  Vishal Adrian Jr., MD as PCP - General (Internal Medicine)  Morris Cai MD as Consulting Physician (Sleep Medicine)  Michaela Hylton MD as Consulting Physician (Cardiology)  Hardy Banerjee MD as Consulting Physician (Ophthalmology)  Chula Christianson MD as Consulting Physician (Ophthalmology)  Jason Sherman MD (Endocrinology)  Perla Mayen MD as Consulting Physician (Nephrology)  Federico Hebert MD as Consulting Physician (Pulmonary Disease)    Interval History:   Postoperative day 1 status post hip fracture repair    Subjective:  Following for coronary artery disease/history of A. fib    Objective   Vital Signs  Temp:  [97.3 °F (36.3 °C)-99.1 °F (37.3 °C)] 97.8 °F (36.6 °C)  Heart Rate:  [75-80] 76  Resp:  [16] 16  BP: (102-157)/(58-85) 109/59    Intake/Output Summary (Last 24 hours) at 2020 1136  Last data filed at 2020 0900  Gross per 24 hour   Intake 2470 ml   Output 350 ml   Net 2120 ml     Flowsheet Rows      First Filed Value   Admission Height  175.3 cm (69\") Documented at 2020 2306   Admission Weight  94.3 kg (208 lb) Documented at 2020 2306          Physical Exam:   General Appearance:    Alert, cooperative, in no acute distress   Lungs:     Clear to auscultation.  Normal respiratory effort and rate.      Heart:    Regular rhythm and normal rate, normal S1 and S2, no murmurs, gallops or rubs.     Chest Wall:    No abnormalities observed   Abdomen:     Soft, nontender, positive bowel sounds.     Extremities:   no cyanosis, clubbing or edema.  No marked joint deformities.  Adequate musculoskeletal strength.       Results Review:    Results from last 7 days   Lab Units 20  0538   SODIUM mmol/L 136   POTASSIUM mmol/L 4.8   CHLORIDE mmol/L 99   CO2 mmol/L 26.5   BUN mg/dL 52*   CREATININE mg/dL 1.35*   GLUCOSE mg/dL 161*   CALCIUM mg/dL 8.1*     Results from last 7 " days   Lab Units 12/02/20  0208   TROPONIN T ng/mL 0.017     Results from last 7 days   Lab Units 12/04/20  0538   WBC 10*3/mm3 15.81*   HEMOGLOBIN g/dL 10.9*   HEMATOCRIT % 31.5*   PLATELETS 10*3/mm3 159     Results from last 7 days   Lab Units 12/01/20  1556   INR  1.06   APTT seconds 31.3                     Medication Review:   carvedilol, 12.5 mg, Oral, BID With Meals  enoxaparin, 40 mg, Subcutaneous, Q24H  insulin glargine, 15 Units, Subcutaneous, Nightly  insulin lispro, 0-14 Units, Subcutaneous, TID AC  sodium chloride, 10 mL, Intravenous, Q12H  tamsulosin, 0.4 mg, Oral, Daily         lactated ringers, 9 mL/hr, Last Rate: 9 mL/hr (12/03/20 1358)  sodium chloride, 50 mL/hr        Assessment/Plan     1.  Coronary artery disease.  Status post bypass on July 18 of 2019 secondary to multivessel coronary artery disease.  Repeat heart catheterization in November 2019 showed patent bypass grafts with significant native vessel disease and elevated biventricular filling pressures.  2.  Postoperative atrial fibrillation.  CHADS2-Vasc score of 4.  This was a brief postoperative episode.  Not on anticoagulation or antiarrhythmic.  3.  Hypertension. Managed by nephrology  4.  Hyperlipidemia.  On atorvastatin  5.  Carotid artery disease with bilateral mild stenosis.  6.  Diabetes.  7.  Fall with hip fracture.  Postoperative day 1 repair    Stable after surgery.  Continue home medications.  I am not going to make any changes.    I will arrange for him to have follow-up with the nurse practitioner in January.    Michaela Hylton MD, [facility]  12/04/20  11:36 EST

## 2020-12-04 NOTE — PROGRESS NOTES
Procedure(s):  LEFT HIP INTRAMEDULLARY NAIL     LOS: 3 days     Subjective :   Complains of pain right leg.  controlled with meds.    Objective :    Vital signs in last 24 hours:  Vitals:    12/03/20 2117 12/03/20 2235 12/04/20 0300 12/04/20 0641   BP: 139/73 153/75 102/58 109/59   BP Location: Right arm Left arm Right arm Right arm   Patient Position: Lying Lying Lying Lying   Pulse: 78 80 77 76   Resp: 16 16 16 16   Temp: 97.3 °F (36.3 °C) 97.9 °F (36.6 °C) 98 °F (36.7 °C) 97.8 °F (36.6 °C)   TempSrc: Oral Oral Skin Skin   SpO2: 90% 92% 94% 92%   Weight:       Height:           PHYSICAL EXAM:  Patient is calm, in no acute distress, awake and oriented x 3.  Dressing is intact  No signs of infection.  Swelling is appropriate in amount.  Ecchymosis is appropriate in amount.  Homans test is negative.  Patient is neurovascularly intact distally.    LABS:  Results from last 7 days   Lab Units 12/04/20  0538   WBC 10*3/mm3 15.81*   HEMOGLOBIN g/dL 10.9*   HEMATOCRIT % 31.5*   PLATELETS 10*3/mm3 159     Results from last 7 days   Lab Units 12/04/20  0538   SODIUM mmol/L 136   POTASSIUM mmol/L 4.8   CHLORIDE mmol/L 99   CO2 mmol/L 26.5   BUN mg/dL 52*   CREATININE mg/dL 1.35*   GLUCOSE mg/dL 161*   CALCIUM mg/dL 8.1*     Results from last 7 days   Lab Units 12/01/20  1556   INR  1.06   APTT seconds 31.3         ASSESSMENT:  Status post Procedure(s):  LEFT HIP INTRAMEDULLARY NAIL      Plan:  Continue Physical Therapy, increase mobility and range of motion as tolerated.  Continue SCDs, Continue DVT prophylaxis.  Lovenox 40 mg Qdaily  Dispo planning for Rehab  PT/OT - TDWB LLE.  Follow up with Dr. Ravi in 2-3 weeks.    Niraj Ravi MD    Date: 12/4/2020  Time: 08:56 EST

## 2020-12-04 NOTE — PROGRESS NOTES
Continued Stay Note  [facility]     Patient Name: Dilip Verma  MRN: [id number]  Today's Date: 12/4/2020    Admit Date: 12/1/2020    Discharge Plan     Row Name 12/04/20 1412       Plan    Plan  ainsley Cabrera pending    Patient/Family in Agreement with Plan  yes    Plan Comments  Spoke with pt, verified correct information on facesheet and explained the role of CCP. Per pts wife, [facility]st choice would be [facility]  and [facility]. Referral was sent in [facility] to [facility] and notified Ann Marie with [facility]. Per Ann Marie they have started precert. Plan will be to d/c to [facility] when precert approved. CCP to follow.        Discharge Codes    No documentation.             Claudia Polo RN

## 2020-12-04 NOTE — ANESTHESIA POSTPROCEDURE EVALUATION
"Patient: Dilip Verma    Procedure Summary     Date: 12/03/20 Room / Location: [facility] OR  / [facility] MAIN OR    Anesthesia Start: 1553 Anesthesia Stop: 1923    Procedure: LEFT HIP INTRAMEDULLARY NAIL (Left Thigh) Diagnosis:     Surgeon: Niraj Ravi MD Provider: Devyn Faith MD    Anesthesia Type: general ASA Status: 4          Anesthesia Type: general    Vitals  Vitals Value Taken Time   /80 12/03/20 1930   Temp 37.3 °C (99.1 °F) 12/03/20 1918   Pulse 76 12/03/20 1944   Resp 16 12/03/20 1930   SpO2 97 % 12/03/20 1944   Vitals shown include unvalidated device data.        Post Anesthesia Care and Evaluation    Patient location during evaluation: bedside  Patient participation: complete - patient participated  Level of consciousness: awake and alert  Pain management: adequate  Airway patency: patent  Anesthetic complications: No anesthetic complications    Cardiovascular status: acceptable  Respiratory status: acceptable  Hydration status: acceptable    Comments: /80   Pulse 76   Temp 37.3 °C (99.1 °F) (Oral)   Resp 16   Ht 175.3 cm (69\")   Wt 94.3 kg (208 lb)   SpO2 94%   BMI 30.72 kg/m²       "

## 2020-12-04 NOTE — THERAPY EVALUATION
"Patient Name: Dilip Verma  : 1950    MRN: [id number]                              Today's Date: 2020       Admit Date: 2020    Visit Dx:     ICD-10-CM ICD-9-CM   1. Closed nondisplaced intertrochanteric fracture of left femur, initial encounter (CMS/HCC)  S72.145A 820.21   2. Subungual hematoma of finger, initial encounter  S60.10XA 923.3   3. Fall, initial encounter  W19.XXXA E888.9   4. Abrasion of right forearm, initial encounter  S50.811A 913.0     Patient Active Problem List   Diagnosis   • Anxiety   • Colon polyp   • Diabetes mellitus, type II (CMS/[facility])   • Erectile dysfunction   • Hyperlipidemia   • CAD (coronary artery disease)   • Hx of CABG   • Chronic diastolic CHF (congestive heart failure) (CMS/[facility])   • Hypersomnia due to medical condition   • CSA (central sleep apnea)   • Periodic breathing   • HTN (hypertension)   • Hypoxia   • Closed nondisplaced intertrochanteric fracture of left femur (CMS/[facility])   • History of stroke   • CKD (chronic kidney disease) stage 3, GFR 30-59 ml/min   • Urinary retention   • AMENA (acute kidney injury) (CMS/[facility])     Past Medical History:   Diagnosis Date   • Anxiety    • Chronic diastolic (congestive) heart failure (CMS/[facility])    • Chronic kidney disease     stones- had lithotripsy   • Colon polyp    • Coronary artery disease     CABG 2019   • Diabetes mellitus, type II (CMS/[facility])    • Erectile dysfunction    • H/O bone density study never   • Hyperlipidemia    • Hypersomnia    • Hypertension    • Kidney stone    • Orthostasis    • Periodic breathing    • Routine eye exam    • Sleep apnea     bipap   • Stroke (CMS/[facility])     \"slight stroke\"     Past Surgical History:   Procedure Laterality Date   • CARDIAC CATHETERIZATION N/A 7/15/2019    Procedure: Coronary angiography;  Surgeon: Carrie Price MD;  Location: [facility] INVASIVE LOCATION;  Service: Cardiovascular   • CARDIAC CATHETERIZATION N/A 7/15/2019    Procedure: Left Heart Cath;  Surgeon: " Carrie Price MD;  Location: [facility] CATH INVASIVE LOCATION;  Service: Cardiovascular   • CARDIAC CATHETERIZATION N/A 7/15/2019    Procedure: Left ventriculography;  Surgeon: Carrie Price MD;  Location: [facility] CATH INVASIVE LOCATION;  Service: Cardiovascular   • CARDIAC CATHETERIZATION  7/15/2019    Procedure: Functional Flow [place];  Surgeon: Carrie Price MD;  Location: [facility] CATH INVASIVE LOCATION;  Service: Cardiovascular   • CARDIAC CATHETERIZATION N/A 11/6/2019    Procedure: Right and Left Heart Cath;  Surgeon: Mya Smith MD;  Location: [facility] CATH INVASIVE LOCATION;  Service: Cardiovascular   • CARDIAC CATHETERIZATION N/A 11/6/2019    Procedure: Coronary angiography;  Surgeon: Mya Smith MD;  Location: [facility] CATH INVASIVE LOCATION;  Service: Cardiovascular   • CATARACT EXTRACTION EXTRACAPSULAR W/ INTRAOCULAR LENS IMPLANTATION Bilateral    • COLONOSCOPY  01/2015    dr [email or web address]   • CORONARY ARTERY BYPASS GRAFT N/A 7/18/2019    Procedure: INTRAOPERATIVE SHOAIB; STERNOTOMY CORONARY ARTERY BYPASS x 3  USING LEFT INTERNAL MAMMARY ARTERY GRAFT UTILIZING ENDOSCOPICALLY HARVESTED RIGHT GREATER SAPHENOUS VEIN AND PRP.;  Surgeon: Bill Devi MD;  Location: [facility] OR;  Service: Cardiothoracic   • DENTAL PROCEDURE      3 surgeries inder implants   • FEMUR IM NAILING/RODDING Left 12/3/2020    Procedure: LEFT HIP INTRAMEDULLARY NAIL;  Surgeon: Niraj Ravi MD;  Location: [facility] MAIN OR;  Service: Orthopedic Spine;  Laterality: Left;   • HERNIA REPAIR      inguinal bilaterally   • KIDNEY STONE SURGERY      lithotripsy     General Information     Row Name 12/04/20 1310          Physical Therapy Time and Intention    Document Type  evaluation  -CF     Mode of Treatment  physical therapy  -CF     Row Name 12/04/20 1310          General Information    Patient Profile Reviewed  yes  -CF     Prior Level of Function  independent:  -CF     Existing Precautions/Restrictions  (S) fall;oxygen therapy device  and L/min;other (see comments) TDWB L  -CF     Barriers to Rehab  none identified  -CF     Row Name 12/04/20 1310          Living Environment    Lives With  spouse  -     Row Name 12/04/20 1310          Home Main Entrance    Number of Stairs, Main Entrance  none has chair lift  -     Row Name 12/04/20 1310          Stairs Within Home, Primary    Number of Stairs, Within Home, Primary  none  -     Row Name 12/04/20 1310          Cognition    Orientation Status (Cognition)  oriented x 4  -CF     Row Name 12/04/20 1310          Safety Issues, Functional Mobility    Impairments Affecting Function (Mobility)  balance;endurance/activity tolerance;range of motion (ROM);strength;pain  -CF       User Key  (r) = Recorded By, (t) = Taken By, (c) = Cosigned By    Initials Name Provider Type    CF Juana Ortega, PT Physical Therapist        Mobility     Row Name 12/04/20 1311          Bed Mobility    Bed Mobility  supine-sit;sit-supine;scooting/bridging  -     Scooting/Bridging [place] (Bed Mobility)  minimum assist (75% patient effort);2 person assist;verbal cues;nonverbal cues (demo/gesture)  -     Supine-Sit [place] (Bed Mobility)  verbal cues;maximum assist (25% patient effort);2 person assist;nonverbal cues (demo/gesture)  -CF     Sit-Supine [place] (Bed Mobility)  verbal cues;nonverbal cues (demo/gesture);maximum assist (25% patient effort);2 person assist  -     Assistive Device (Bed Mobility)  bed rails;draw sheet;head of bed elevated  -CF     Comment (Bed Mobility)  pt able ot use BUE to scoot up in bed. Pt resistant to assist with bed mobility - educated to relax and allow us to assist to decrease pain. Pt yelling and groaning in pain throughout  -CF     Row Name 12/04/20 1311          Transfers    Comment (Transfers)  Pt stood x1 for about 20 sec with significant posterior lean. Pt unable to fully adhere to TDWB precaution as pt's foot resting on PT's foot to gauge amount of WB. Pt with  continuous moaning and groaning  -CF     Row Name 12/04/20 1311          Sit-Stand Transfer    Sit-Stand [place] (Transfers)  verbal cues;nonverbal cues (demo/gesture);moderate assist (50% patient effort);2 person assist  -     Assistive Device (Sit-Stand Transfers)  walker, front-wheeled  -CF     Row Name 12/04/20 1311          Gait/Stairs (Locomotion)    [place] Level (Gait)  not tested  -     Row Name 12/04/20 1311          Mobility    Extremity Weight-bearing Status  left lower extremity  -CF     Left Lower Extremity (Weight-bearing Status)  touch down weight-bearing (TDWB)  -       User Key  (r) = Recorded By, (t) = Taken By, (c) = Cosigned By    Initials Name Provider Type    CF Juana Ortgea PT Physical Therapist        Obj/Interventions     Row Name 12/04/20 1313          Range of Motion Comprehensive    Comment, General Range of Motion  L limited 2/2 pain  -     Row Name 12/04/20 1313          Strength Comprehensive (MMT)    Comment, General Manual Muscle Testing (MMT) Assessment  L limited d/t pain.  -     Row Name 12/04/20 1313          Motor Skills    Therapeutic Exercise  other (see comments) L heel slides AAROM and glutes sets x10. pt declined additional exercises  -     Row Name 12/04/20 1313          Balance    Balance Assessment  sitting static balance;sitting dynamic balance;standing static balance  -CF     Static Sitting Balance  mild impairment;sitting, edge of bed cga for safety  -CF     Dynamic Sitting Balance  mild impairment;sitting, edge of bed  -     Static Standing Balance  moderate impairment;supported mod x2  -CF       User Key  (r) = Recorded By, (t) = Taken By, (c) = Cosigned By    Initials Name Provider Type    Juana Peck PT Physical Therapist        Goals/Plan     Row Name 12/04/20 1318          Bed Mobility Goal 1 (PT)    Activity/Assistive Device (Bed Mobility Goal 1, PT)  bed mobility activities, all  -CF     [place] Level/Cues Needed  (Bed Mobility Goal 1, PT)  1 person assist;moderate assist (50-74% patient effort)  -CF     Time Frame (Bed Mobility Goal 1, PT)  1 week  -CF     Row Name 12/04/20 1318          Transfer Goal 1 (PT)    Activity/Assistive Device (Transfer Goal 1, PT)  sit-to-stand/stand-to-sit;bed-to-chair/chair-to-bed  -CF     [place] Level/Cues Needed (Transfer Goal 1, PT)  moderate assist (50-74% patient effort);1 person assist  -CF     Time Frame (Transfer Goal 1, PT)  1 week  -CF     Row Name 12/04/20 1318          Gait Training Goal 1 (PT)    Activity/Assistive Device (Gait Training Goal 1, PT)  walker, rolling;gait (walking locomotion)  -CF     [place] Level (Gait Training Goal 1, PT)  moderate assist (50-74% patient effort);1 person assist  -CF     Distance (Gait Training Goal 1, PT)  10'  -CF     Time Frame (Gait Training Goal 1, PT)  1 week  -CF       User Key  (r) = Recorded By, (t) = Taken By, (c) = Cosigned By    Initials Name Provider Type    CF Juana Ortega, SHEFALI Physical Therapist        Clinical Impression     Row Name 12/04/20 9631          Pain    Additional Documentation  Pain Scale: Numbers Pre/Post-Treatment (Group)  -     Row Name 12/04/20 0391          Pain Scale: Numbers Pre/Post-Treatment    Pretreatment Pain Rating  4/10  -CF     Posttreatment Pain Rating  3/10  -CF     Pain Location - Side  Left  -CF     Pain Location  hip  -CF     Pre/Posttreatment Pain Comment  pt yelling and moaniing throughout but only reports 4/10 pain at highest.  -CF     Pain Intervention(s)  Repositioned;Ambulation/increased activity;Rest;Cold applied  -CF     Row Name 12/04/20 8342          Plan of Care Review    Plan of Care Reviewed With  patient  -CF     Outcome Summary  Pt is a 69 yo male s/p L IM nailing following a fall leading to subtrochanter fx and femoral neck fx. Pt reports IND at baseline, owns equipment and chair lift, lives with wife who can provide assistance. Pt is TDWB on L and was educated on this  precaution. Pt yelling and moaning in pain at attempts for bed mobility - PT educated pt to relax and not resist movement to decrease the pain. Pt performs supine<>sit with max A x2 and performs sit<>stand with mod A x2. Pt with posterior lean in standing and did not fully adhere to WB precaution. Pt may benefit from skilled PT to address functional mobility deficits and increase functional independence. Recommending d/c to IPR vs SNF at this time.  -CF     Row Name 12/04/20 1314          Therapy Assessment/Plan (PT)    Rehab Potential (PT)  good, to achieve stated therapy goals  -CF     Criteria for Skilled Interventions Met (PT)  yes  -CF     Predicted Duration of Therapy Intervention (PT)  1 week  -CF     Row Name 12/04/20 1314          Vital Signs    O2 Delivery Pre Treatment  room air  -CF     Row Name 12/04/20 1314          Positioning and Restraints    Pre-Treatment Position  in bed  -CF     Post Treatment Position  bed  -CF     In Bed  fowlers;call light within reach;encouraged to call for assist;exit alarm on;patient within staff view;LLE elevated  -CF       User Key  (r) = Recorded By, (t) = Taken By, (c) = Cosigned By    Initials Name Provider Type    CF Juana Ortega, PT Physical Therapist        Outcome Measures     Row Name 12/04/20 1314          How much help from another person do you currently need...    Turning from your back to your side while in flat bed without using bedrails?  2  -CF     Moving from lying on back to sitting on the side of a flat bed without bedrails?  2  -CF     Moving to and from a bed to a chair (including a wheelchair)?  1  -CF     Standing up from a chair using your arms (e.g., wheelchair, bedside chair)?  2  -CF     Climbing 3-5 steps with a railing?  1  -CF     To walk in hospital room?  1  -CF     AM-PAC 6 Clicks Score (PT)  9  -CF     Row Name 12/04/20 1319          Functional Assessment    Outcome Measure Options  AM-PAC 6 Clicks Basic Mobility (PT)  -CF       User  Key  (r) = Recorded By, (t) = Taken By, (c) = Cosigned By    Initials Name Provider Type    CF Juana Ortega PT Physical Therapist        Physical Therapy Education                 Title: PT OT SLP Therapies (Done)     Topic: Physical Therapy (Done)     Point: Mobility training (Done)     Learning Progress Summary           Patient Acceptance, E, VU,NR by  at 12/4/2020 1319                   Point: Home exercise program (Done)     Learning Progress Summary           Patient Acceptance, E, VU,NR by  at 12/4/2020 1319                   Point: Body mechanics (Done)     Learning Progress Summary           Patient Acceptance, E, VU,NR by CF at 12/4/2020 1319                   Point: Precautions (Done)     Learning Progress Summary           Patient Acceptance, E, VU,NR by  at 12/4/2020 1319                               User Key     Initials Effective Dates Name Provider Type Discipline     09/02/20 -  Juana Ortega PT Physical Therapist PT              PT Recommendation and Plan  Planned Therapy Interventions (PT): balance training, bed mobility training, gait training, home exercise program, ROM (range of motion), stair training, strengthening, stretching, patient/family education, transfer training  Plan of Care Reviewed With: patient  Outcome Summary: Pt is a 69 yo male s/p L IM nailing following a fall leading to subtrochanter fx and femoral neck fx. Pt reports IND at baseline, owns equipment and chair lift, lives with wife who can provide assistance. Pt is TDWB on L and was educated on this precaution. Pt yelling and moaning in pain at attempts for bed mobility - PT educated pt to relax and not resist movement to decrease the pain. Pt performs supine<>sit with max A x2 and performs sit<>stand with mod A x2. Pt with posterior lean in standing and did not fully adhere to WB precaution. Pt may benefit from skilled PT to address functional mobility deficits and increase functional independence.  Recommending d/c to IPR vs SNF at this time.     Time Calculation:   PT Charges     Row Name 12/04/20 1309             Time Calculation    Start Time  0936  -CF      Stop Time  1000  -CF      Time Calculation (min)  24 min  -CF      PT Received On  12/04/20  -CF      PT - Next Appointment  12/05/20  -CF      PT Goal Re-Cert Due Date  12/11/20  -CF         Time Calculation- PT    Total Timed Code Minutes- PT  20 minute(s)  -CF        User Key  (r) = Recorded By, (t) = Taken By, (c) = Cosigned By    Initials Name Provider Type    CF Juana Ortega, PT Physical Therapist        Therapy Charges for Today     Code Description Service Date Service Provider Modifiers Qty    41882998052 HC PT EVAL LOW COMPLEXITY 2 12/4/2020 Juana Ortega, PT GP 1    45080121300 HC PT THERAPEUTIC ACT EA 15 MIN 12/4/2020 Juana Ortega, PT GP 1          PT G-Codes  Outcome Measure Options: AM-PAC 6 Clicks Basic Mobility (PT)  AM-PAC 6 Clicks Score (PT): 9    Juana Ortega PT  12/4/2020

## 2020-12-04 NOTE — DISCHARGE PLACEMENT REQUEST
"Dilip Reilly (70 y.o. Male)     Date of Birth Social Security Number Address Home Phone MRN    1950  [address] [place] [facility] [address] [phone] [id number]    Islam Marital Status          Bahai        Admission Date Admission Type Admitting Provider Attending Provider Department, Room/Bed    12/1/20 Emergency Que Shah MD Nguyen, Minh Loc, MD [address], P894/1    Discharge Date Discharge Disposition Discharge Destination                       Attending Provider: Turner Blanca MD    Allergies: Ace Inhibitors, Clonidine Derivatives, Losartan, Amlodipine, Ativan [Lorazepam], Minoxidil, Tetracycline    Isolation: None   Infection: None   Code Status: CPR    Ht: 175.3 cm (69\")   Wt: 94.3 kg (208 lb)    Admission Cmt: None   Principal Problem: Closed nondisplaced intertrochanteric fracture of left femur (CMS/HCC) [S72.145A]                 Active Insurance as of 12/1/2020     Primary Coverage     Payor Plan Insurance Group Employer/Plan Group    [facility] MEDICARE REPLACEMENT [place] [facility] MEDICARE REPLACEMENT [id number]     Payor Plan Address Payor Plan Phone Number Payor Plan Fax Number Effective Dates    [address] [address]   1/1/2015 - None Entered    [facility] [id number]       Subscriber Name Subscriber Birth Date Member ID       DILIP REILLY 1950 [id number]                 Emergency Contacts      (Rel.) Home Phone Work Phone Mobile Phone    Beny Reilly (Spouse) [phone] -- [phone]            "

## 2020-12-05 LAB
ANION GAP SERPL CALCULATED.3IONS-SCNC: 9.3 MMOL/L (ref 5–15)
BUN SERPL-MCNC: 57 MG/DL (ref 8–23)
BUN/CREAT SERPL: 42.2 (ref 7–25)
CALCIUM SPEC-SCNC: 8.5 MG/DL (ref 8.6–10.5)
CHLORIDE SERPL-SCNC: 93 MMOL/L (ref 98–107)
CO2 SERPL-SCNC: 27.7 MMOL/L (ref 22–29)
CREAT SERPL-MCNC: 1.35 MG/DL (ref 0.76–1.27)
DEPRECATED RDW RBC AUTO: 42.7 FL (ref 37–54)
ERYTHROCYTE [DISTWIDTH] IN BLOOD BY AUTOMATED COUNT: 13 % (ref 12.3–15.4)
GFR SERPL CREATININE-BSD FRML MDRD: 52 ML/MIN/1.73
GLUCOSE BLDC GLUCOMTR-MCNC: 140 MG/DL (ref 70–130)
GLUCOSE BLDC GLUCOMTR-MCNC: 167 MG/DL (ref 70–130)
GLUCOSE BLDC GLUCOMTR-MCNC: 170 MG/DL (ref 70–130)
GLUCOSE BLDC GLUCOMTR-MCNC: 206 MG/DL (ref 70–130)
GLUCOSE SERPL-MCNC: 165 MG/DL (ref 65–99)
HCT VFR BLD AUTO: 27.5 % (ref 37.5–51)
HGB BLD-MCNC: 9.5 G/DL (ref 13–17.7)
MCH RBC QN AUTO: 31.7 PG (ref 26.6–33)
MCHC RBC AUTO-ENTMCNC: 34.5 G/DL (ref 31.5–35.7)
MCV RBC AUTO: 91.7 FL (ref 79–97)
PLATELET # BLD AUTO: 156 10*3/MM3 (ref 140–450)
PMV BLD AUTO: 10.7 FL (ref 6–12)
POTASSIUM SERPL-SCNC: 4.4 MMOL/L (ref 3.5–5.2)
RBC # BLD AUTO: 3 10*6/MM3 (ref 4.14–5.8)
SODIUM SERPL-SCNC: 130 MMOL/L (ref 136–145)
WBC # BLD AUTO: 13.15 10*3/MM3 (ref 3.4–10.8)

## 2020-12-05 PROCEDURE — 82962 GLUCOSE BLOOD TEST: CPT

## 2020-12-05 PROCEDURE — 80048 BASIC METABOLIC PNL TOTAL CA: CPT | Performed by: NURSE PRACTITIONER

## 2020-12-05 PROCEDURE — 63710000001 INSULIN GLARGINE PER 5 UNITS: Performed by: ORTHOPAEDIC SURGERY

## 2020-12-05 PROCEDURE — 25010000002 ENOXAPARIN PER 10 MG: Performed by: ORTHOPAEDIC SURGERY

## 2020-12-05 PROCEDURE — 85027 COMPLETE CBC AUTOMATED: CPT | Performed by: NURSE PRACTITIONER

## 2020-12-05 PROCEDURE — 63710000001 INSULIN LISPRO (HUMAN) PER 5 UNITS: Performed by: ORTHOPAEDIC SURGERY

## 2020-12-05 PROCEDURE — 97110 THERAPEUTIC EXERCISES: CPT

## 2020-12-05 RX ORDER — HYDROCODONE BITARTRATE AND ACETAMINOPHEN 5; 325 MG/1; MG/1
1 TABLET ORAL EVERY 8 HOURS PRN
Status: DISCONTINUED | OUTPATIENT
Start: 2020-12-05 | End: 2020-12-07 | Stop reason: HOSPADM

## 2020-12-05 RX ADMIN — SODIUM CHLORIDE, PRESERVATIVE FREE 10 ML: 5 INJECTION INTRAVENOUS at 20:01

## 2020-12-05 RX ADMIN — HYDROCODONE BITARTRATE AND ACETAMINOPHEN 1 TABLET: 5; 325 TABLET ORAL at 10:55

## 2020-12-05 RX ADMIN — CARVEDILOL 12.5 MG: 12.5 TABLET, FILM COATED ORAL at 09:37

## 2020-12-05 RX ADMIN — INSULIN GLARGINE 15 UNITS: 100 INJECTION, SOLUTION SUBCUTANEOUS at 21:57

## 2020-12-05 RX ADMIN — DOCUSATE SODIUM 50MG AND SENNOSIDES 8.6MG 2 TABLET: 8.6; 5 TABLET, FILM COATED ORAL at 20:01

## 2020-12-05 RX ADMIN — INSULIN LISPRO 3 UNITS: 100 INJECTION, SOLUTION INTRAVENOUS; SUBCUTANEOUS at 09:39

## 2020-12-05 RX ADMIN — HYDROCODONE BITARTRATE AND ACETAMINOPHEN 1 TABLET: 5; 325 TABLET ORAL at 19:57

## 2020-12-05 RX ADMIN — ENOXAPARIN SODIUM 40 MG: 100 INJECTION SUBCUTANEOUS at 09:37

## 2020-12-05 RX ADMIN — CARVEDILOL 12.5 MG: 12.5 TABLET, FILM COATED ORAL at 18:15

## 2020-12-05 RX ADMIN — SODIUM CHLORIDE, PRESERVATIVE FREE 10 ML: 5 INJECTION INTRAVENOUS at 09:41

## 2020-12-05 RX ADMIN — DOCUSATE SODIUM 50MG AND SENNOSIDES 8.6MG 2 TABLET: 8.6; 5 TABLET, FILM COATED ORAL at 09:36

## 2020-12-05 RX ADMIN — HYDROCODONE BITARTRATE AND ACETAMINOPHEN 1 TABLET: 5; 325 TABLET ORAL at 03:19

## 2020-12-05 RX ADMIN — TAMSULOSIN HYDROCHLORIDE 0.8 MG: 0.4 CAPSULE ORAL at 09:36

## 2020-12-05 NOTE — THERAPY TREATMENT NOTE
"Patient Name: Dilip Verma  : 1950    MRN: [id number]                              Today's Date: 2020       Admit Date: 2020    Visit Dx:     ICD-10-CM ICD-9-CM   1. Closed nondisplaced intertrochanteric fracture of left femur, initial encounter (CMS/HCC)  S72.145A 820.21   2. Subungual hematoma of finger, initial encounter  S60.10XA 923.3   3. Fall, initial encounter  W19.XXXA E888.9   4. Abrasion of right forearm, initial encounter  S50.811A 913.0     Patient Active Problem List   Diagnosis   • Anxiety   • Colon polyp   • Diabetes mellitus, type II (CMS/[facility])   • Erectile dysfunction   • Hyperlipidemia   • CAD (coronary artery disease)   • Hx of CABG   • Chronic diastolic CHF (congestive heart failure) (CMS/[facility])   • Hypersomnia due to medical condition   • CSA (central sleep apnea)   • Periodic breathing   • HTN (hypertension)   • Hypoxia   • Closed nondisplaced intertrochanteric fracture of left femur (CMS/[facility])   • History of stroke   • CKD (chronic kidney disease) stage 3, GFR 30-59 ml/min   • Urinary retention   • AMENA (acute kidney injury) (CMS/[facility])   • Acute posthemorrhagic anemia     Past Medical History:   Diagnosis Date   • Anxiety    • Chronic diastolic (congestive) heart failure (CMS/[facility])    • Chronic kidney disease     stones- had lithotripsy   • Colon polyp    • Coronary artery disease     CABG 2019   • Diabetes mellitus, type II (CMS/[facility])    • Erectile dysfunction    • H/O bone density study never   • Hyperlipidemia    • Hypersomnia    • Hypertension    • Kidney stone    • Orthostasis    • Periodic breathing    • Routine eye exam    • Sleep apnea     bipap   • Stroke (CMS/[facility])     \"slight stroke\"     Past Surgical History:   Procedure Laterality Date   • CARDIAC CATHETERIZATION N/A 7/15/2019    Procedure: Coronary angiography;  Surgeon: Carrie Price MD;  Location: [facility] INVASIVE LOCATION;  Service: Cardiovascular   • CARDIAC CATHETERIZATION N/A 7/15/2019    " Procedure: Left Heart Cath;  Surgeon: Carrie Price MD;  Location: [facility] CATH INVASIVE LOCATION;  Service: Cardiovascular   • CARDIAC CATHETERIZATION N/A 7/15/2019    Procedure: Left ventriculography;  Surgeon: Carrie Price MD;  Location: [facility] CATH INVASIVE LOCATION;  Service: Cardiovascular   • CARDIAC CATHETERIZATION  7/15/2019    Procedure: Functional Flow [place];  Surgeon: Carrie Price MD;  Location: [facility] CATH INVASIVE LOCATION;  Service: Cardiovascular   • CARDIAC CATHETERIZATION N/A 11/6/2019    Procedure: Right and Left Heart Cath;  Surgeon: Mya Smith MD;  Location: [facility] CATH INVASIVE LOCATION;  Service: Cardiovascular   • CARDIAC CATHETERIZATION N/A 11/6/2019    Procedure: Coronary angiography;  Surgeon: Mya Smith MD;  Location: [facility] CATH INVASIVE LOCATION;  Service: Cardiovascular   • CATARACT EXTRACTION EXTRACAPSULAR W/ INTRAOCULAR LENS IMPLANTATION Bilateral    • COLONOSCOPY  01/2015    dr jimenez   • CORONARY ARTERY BYPASS GRAFT N/A 7/18/2019    Procedure: INTRAOPERATIVE SHOAIB; STERNOTOMY CORONARY ARTERY BYPASS x 3  USING LEFT INTERNAL MAMMARY ARTERY GRAFT UTILIZING ENDOSCOPICALLY HARVESTED RIGHT GREATER SAPHENOUS VEIN AND PRP.;  Surgeon: Bill Devi MD;  Location: [facility] MAIN OR;  Service: Cardiothoracic   • DENTAL PROCEDURE      3 surgeries inder implants   • FEMUR IM NAILING/RODDING Left 12/3/2020    Procedure: LEFT HIP INTRAMEDULLARY NAIL;  Surgeon: Niraj Ravi MD;  Location: [facility] MAIN OR;  Service: Orthopedic Spine;  Laterality: Left;   • HERNIA REPAIR      inguinal bilaterally   • KIDNEY STONE SURGERY      lithotripsy     General Information     Row Name 12/05/20 1748          Physical Therapy Time and Intention    Document Type  therapy note (daily note)  -     Mode of Treatment  physical therapy  -     Row Name 12/05/20 1743          General Information    Patient Profile Reviewed  yes  -     Existing Precautions/Restrictions  fall;oxygen therapy device  and L/min;weight bearing TDWB LLE  -     Row Name 12/05/20 1748          Safety Issues, Functional Mobility    Impairments Affecting Function (Mobility)  balance;coordination;endurance/activity tolerance;range of motion (ROM);strength;pain  -       User Key  (r) = Recorded By, (t) = Taken By, (c) = Cosigned By    Initials Name Provider Type    Michaela Green PTA Physical Therapy Assistant        Mobility     Row Name 12/05/20 1750          Bed Mobility    Scooting/Bridging [place] (Bed Mobility)  2 person assist;moderate assist (50% patient effort)  -     Supine-Sit [place] (Bed Mobility)  2 person assist;moderate assist (50% patient effort);maximum assist (25% patient effort);verbal cues;nonverbal cues (demo/gesture)  -     Sit-Supine [place] (Bed Mobility)  2 person assist;maximum assist (25% patient effort);verbal cues;nonverbal cues (demo/gesture)  -     Assistive Device (Bed Mobility)  bed rails;draw sheet  -     Comment (Bed Mobility)  allowed pt use of rails so he could help during tfers w/UEs, difficulty focusing on task due to moaning w/any movement  -     Row Name 12/05/20 1750          Transfers    Comment (Transfers)  pt able to stand x1 at EOB, placed shoe on R foot to assist w/TDWB on L; pt came to full standing  -[facility] Name 12/05/20 1750          Sit-Stand Transfer    Sit-Stand [place] (Transfers)  2 person assist;moderate assist (50% patient effort);minimum assist (75% patient effort);verbal cues;nonverbal cues (demo/gesture) elevated bed  -     Assistive Device (Sit-Stand Transfers)  walker, front-wheeled  -     Row Name 12/05/20 1750          Mobility    Extremity Weight-bearing Status  left lower extremity  -     Left Lower Extremity (Weight-bearing Status)  touch down weight-bearing (TDWB)  -       User Key  (r) = Recorded By, (t) = Taken By, (c) = Cosigned By    Initials Name Provider Type    Michaela Green PTA Physical Therapy Assistant         Obj/Interventions    No documentation.       Goals/Plan    No documentation.       Clinical Impression     Row Name 12/05/20 1758          Pain Scale: Numbers Pre/Post-Treatment    Pretreatment Pain Rating  9/10  -JM     Posttreatment Pain Rating  10/10  -JM     Pain Location - Side  Left  -     Pain Location  hip  -     Pain Intervention(s)  Medication (See MAR);Repositioned;Elevated  -     Row Name 12/05/20 1758          Plan of Care Review    Plan of Care Reviewed With  patient  -     Outcome Summary  Pt agreeable to PT but anticipates pain w/slightest movement and screams out or moans throughout rx; pt did say it went the best today on standing that he has done since his stay ; min/mod 2 STS, w/rwx and elevated bed; placed shoe on R foot to assist w/TDWB; plans SN[facility]     Row Name 12/05/20 1758          Therapy Assessment/Plan (PT)    Criteria for Skilled Interventions Met (PT)  yes  -[facility] Name 12/05/20 1758          Vital Signs    O2 Delivery Intra Treatment  supplemental O2  -     Row Name 12/05/20 1758          Positioning and Restraints    Pre-Treatment Position  in bed  -     Post Treatment Position  bed  -JM     In Bed  fowlers;call light within reach;encouraged to call for assist;exit alarm on  -       User Key  (r) = Recorded By, (t) = Taken By, (c) = Cosigned By    Initials Name Provider Type    Michaela Green PTA Physical Therapy Assistant        Outcome Measures     Row Name 12/05/20 5603          How much help from another person do you currently need...    Turning from your back to your side while in flat bed without using bedrails?  2  -JM     Moving from lying on back to sitting on the side of a flat bed without bedrails?  2  -JM     Moving to and from a bed to a chair (including a wheelchair)?  1  -JM     Standing up from a chair using your arms (e.g., wheelchair, bedside chair)?  2  -JM     Climbing 3-5 steps with a railing?  1  -JM     To walk in  hospital room?  1  -     AM-PAC 6 Clicks Score (PT)  9  -       User Key  (r) = Recorded By, (t) = Taken By, (c) = Cosigned By    Initials Name Provider Type     Michaela Camargo PTA Physical Therapy Assistant        Physical Therapy Education                 Title: PT OT SLP Therapies (In Progress)     Topic: Physical Therapy (In Progress)     Point: Mobility training (In Progress)     Learning Progress Summary           Patient Acceptance, E,D, NR by  at 12/5/2020 1804    Acceptance, E, VU,NR by  at 12/4/2020 1319                   Point: Home exercise program (In Progress)     Learning Progress Summary           Patient Acceptance, E,D, NR by  at 12/5/2020 1804    Acceptance, E, VU,NR by  at 12/4/2020 1319                   Point: Body mechanics (In Progress)     Learning Progress Summary           Patient Acceptance, E,D, NR by  at 12/5/2020 1804    Acceptance, E, VU,NR by  at 12/4/2020 1319                   Point: Precautions (In Progress)     Learning Progress Summary           Patient Acceptance, E,D, NR by  at 12/5/2020 1804    Acceptance, E, VU,NR by  at 12/4/2020 1319                               User Key     Initials Effective Dates Name Provider Type Discipline     03/07/18 -  Michaela Camargo PTA Physical Therapy Assistant PT     09/02/20 -  Juana Ortega PT Physical Therapist PT              PT Recommendation and Plan     Plan of Care Reviewed With: patient  Outcome Summary: Pt agreeable to PT but anticipates pain w/slightest movement and screams out or moans throughout rx; pt did say it went the best today on standing that he has done since his stay ; min/mod 2 STS, w/rwx and elevated bed; placed shoe on R foot to assist w/TDWB; [place] SN[place]     Time Calculation:   PT Charges     Row Name 12/05/20 1805             Time Calculation    Start Time  1635  -      Stop Time  1700  -      Time Calculation (min)  25 min  -      PT Received On  12/05/20  -      PT  - Next Appointment  12/06/20  -UZIEL        User Key  (r) = Recorded By, (t) = Taken By, (c) = Cosigned By    Initials Name Provider Type    Michaela Green PTA Physical Therapy Assistant        Therapy Charges for Today     Code Description Service Date Service Provider Modifiers Qty    81339911094 HC PT THER PROC EA 15 MIN 12/5/2020 Michaela Camargo PTA GP 2    20420331566 HC PT THER SUPP EA 15 MIN 12/5/2020 Michaela Camargo PTA GP 2          PT G-Codes  Outcome Measure Options: AM-PAC 6 Clicks Basic Mobility (PT)  AM-PAC 6 Clicks Score (PT): 9    Michaela Camargo PTA  12/5/2020

## 2020-12-05 NOTE — PROGRESS NOTES
Name: Dilip Verma ADMIT: 2020   : 1950  PCP: Vishal Adrian Jr., MD    MRN: [id number] LOS: 4 days   AGE/SEX: 70 y.o. male  ROOM: [facility]     Subjective   Subjective   He states his pain is not well controlled, a little somnolent/sluggish    Review of Systems   Constitutional: Negative for fever.   Respiratory: Negative for cough and shortness of breath.    Cardiovascular: Negative for chest pain.   Gastrointestinal: Negative for abdominal pain.      Objective   Objective   Vital Signs  Temp:  [97.3 °F (36.3 °C)-98 °F (36.7 °C)] 97.3 °F (36.3 °C)  Heart Rate:  [71-87] 71  Resp:  [16] 16  BP: (102-120)/(61-75) 117/75  SpO2:  [91 %-97 %] 92 %  on  Flow (L/min):  [2] 2;   Device (Oxygen Therapy): nasal cannula  Body mass index is 30.72 kg/m².    Physical Exam  Vitals signs and nursing note reviewed.   Constitutional:       General: He is not in acute distress.  Cardiovascular:      Rate and Rhythm: Normal rate and regular rhythm.   Pulmonary:      Effort: Pulmonary effort is normal. No respiratory distress.      Breath sounds: Normal breath sounds.   Abdominal:      General: Bowel sounds are normal.      Palpations: Abdomen is soft.      Tenderness: There is no abdominal tenderness. There is no guarding or rebound.   Skin:     General: Skin is warm and dry.   Neurological:      Mental Status: He is lethargic.   Psychiatric:         Behavior: Behavior normal.     Results Review  I reviewed the patient's new clinical results.  Results from last 7 days   Lab Units 20  0721 20  0538 20  0537 20  0208   WBC 10*3/mm3 13.15* 15.81* 15.17* 15.76*   HEMOGLOBIN g/dL 9.5* 10.9* 14.0 14.9   PLATELETS 10*3/mm3 156 159 166 203     Results from last 7 days   Lab Units 20  0721 20  0538 20  0537 20  0208   SODIUM mmol/L 130* 136 135* 138   POTASSIUM mmol/L 4.4 4.8 4.7 4.7   CHLORIDE mmol/L 93* 99 98 102   CO2 mmol/L 27.7 26.5 27.3 28.5   BUN mg/dL 57* 52* 46* 32*    CREATININE mg/dL 1.35* 1.35* 1.59* 1.21   GLUCOSE mg/dL 165* 161* 142* 256*     Estimated Creatinine Clearance: 56.9 mL/min (A) (by C-G formula based on SCr of 1.35 mg/dL (H)).    Results from last 7 days   Lab Units 12/01/20  1556   ALBUMIN g/dL 3.90   BILIRUBIN mg/dL 0.6   ALK PHOS U/L 71   AST (SGOT) U/L 17   ALT (SGPT) U/L 20     Results from last 7 days   Lab Units 12/05/20  0721 12/04/20  0538 12/03/20  0537 12/02/20  0208 12/01/20  1556   CALCIUM mg/dL 8.5* 8.1* 8.9 8.9 9.2   ALBUMIN g/dL  --   --   --   --  3.90       COVID19   Date Value Ref Range Status   12/01/2020 Not Detected Not Detected - Ref. Range Final   07/24/2020 Not Detected Not Detected - Ref. Range Final     Glucose   Date/Time Value Ref Range Status   12/05/2020 1123 167 (H) 70 - 130 mg/dL Final   12/05/2020 0605 170 (H) 70 - 130 mg/dL Final   12/04/2020 2106 210 (H) 70 - 130 mg/dL Final   12/04/2020 1620 147 (H) 70 - 130 mg/dL Final   12/04/2020 1050 187 (H) 70 - 130 mg/dL Final   12/04/2020 0629 167 (H) 70 - 130 mg/dL Final   12/03/2020 1957 139 (H) 70 - 130 mg/dL Final       Scheduled Meds  carvedilol, 12.5 mg, Oral, BID With Meals  enoxaparin, 40 mg, Subcutaneous, Q24H  insulin glargine, 15 Units, Subcutaneous, Nightly  insulin lispro, 0-14 Units, Subcutaneous, TID AC  senna-docusate sodium, 2 tablet, Oral, BID  sodium chloride, 10 mL, Intravenous, Q12H  tamsulosin, 0.8 mg, Oral, Daily    Continuous Infusions  lactated ringers, 9 mL/hr, Last Rate: 9 mL/hr (12/03/20 1358)    PRN Meds  •  acetaminophen **OR** acetaminophen **OR** acetaminophen  •  dextrose  •  dextrose  •  glucagon (human recombinant)  •  HYDROcodone-acetaminophen  •  HYDROmorphone  •  ondansetron  •  [COMPLETED] Insert peripheral IV **AND** sodium chloride  •  sodium chloride  •  sodium chloride    lactated ringers, 9 mL/hr, Last Rate: 9 mL/hr (12/03/20 1365)    Diet  Diet Regular; Consistent Carbohydrate     I personally reviewed images     Assessment/Plan     Active  Hospital Problems    Diagnosis  POA   • **Closed nondisplaced intertrochanteric fracture of left femur (CMS/[facility]) [S72.145A]  Yes   • Acute posthemorrhagic anemia [D62]  Unknown   • AMENA (acute kidney injury) (CMS/[facility]) [N17.9]  Unknown   • Urinary retention [R33.9]  Yes   • History of stroke [Z86.73]  Not Applicable   • CKD (chronic kidney disease) stage 3, GFR 30-59 ml/min [N18.30]  Yes   • HTN (hypertension) [I10]  Yes   • Hypoxia [R09.02]  Yes   • Chronic diastolic CHF (congestive heart failure) (CMS/[facility]) [I50.32]  Yes   • CAD (coronary artery disease) [I25.10]  Yes   • Diabetes mellitus, type II (CMS/[facility]) [E11.9]  Yes      Resolved Hospital Problems   No resolved problems to display.     70 y.o. male admitted with Closed nondisplaced intertrochanteric fracture of left femur (CMS/[facility]).    Status post ORIF 12/3/2020.  PT/OT TDWB left lower extremity and follow-up with orthopedic surgery 2 to 3 weeks.  Postop blood loss anemia expected.  Continue to monitor  Some atelectasis on x-ray contributing to hypoxia.  Encourage I-S, pulmonary hygiene.  Down to 2 L/min nasal cannula from 4 L/min.  Adjust pain medication dosing (he is only on Lortab) he is a little somnolent.  Leukocytosis reactive to surgery and blood loss anemia.  Improving continue to monitor.  Diabetes: Controlled here A1c 9.5 however  CAD/chronic diastolic CHF/hypertension: Stable at the moment  History of stroke  CKD creatinine stable  Lovenox 40 mg SC daily for DVT prophylaxis.  Full code.  Discussed with patient.  Anticipate discharge to SNU facility timing yet to be determined.    Turner Blanca MD  [facility]  12/05/20  14:52 EST    I wore protective equipment throughout this patient encounter including a face mask, gloves, and protective eyewear.  Hand hygiene was performed before donning protective equipment and after removal when leaving the room.

## 2020-12-06 LAB
ANION GAP SERPL CALCULATED.3IONS-SCNC: 10.2 MMOL/L (ref 5–15)
BUN SERPL-MCNC: 56 MG/DL (ref 8–23)
BUN/CREAT SERPL: 51.9 (ref 7–25)
CALCIUM SPEC-SCNC: 8.4 MG/DL (ref 8.6–10.5)
CHLORIDE SERPL-SCNC: 97 MMOL/L (ref 98–107)
CO2 SERPL-SCNC: 26.8 MMOL/L (ref 22–29)
CREAT SERPL-MCNC: 1.08 MG/DL (ref 0.76–1.27)
DEPRECATED RDW RBC AUTO: 43.3 FL (ref 37–54)
ERYTHROCYTE [DISTWIDTH] IN BLOOD BY AUTOMATED COUNT: 12.9 % (ref 12.3–15.4)
GFR SERPL CREATININE-BSD FRML MDRD: 67 ML/MIN/1.73
GLUCOSE BLDC GLUCOMTR-MCNC: 159 MG/DL (ref 70–130)
GLUCOSE BLDC GLUCOMTR-MCNC: 169 MG/DL (ref 70–130)
GLUCOSE BLDC GLUCOMTR-MCNC: 179 MG/DL (ref 70–130)
GLUCOSE BLDC GLUCOMTR-MCNC: 203 MG/DL (ref 70–130)
GLUCOSE SERPL-MCNC: 174 MG/DL (ref 65–99)
HCT VFR BLD AUTO: 28.2 % (ref 37.5–51)
HGB BLD-MCNC: 9.6 G/DL (ref 13–17.7)
MCH RBC QN AUTO: 31.7 PG (ref 26.6–33)
MCHC RBC AUTO-ENTMCNC: 34 G/DL (ref 31.5–35.7)
MCV RBC AUTO: 93.1 FL (ref 79–97)
PLATELET # BLD AUTO: 191 10*3/MM3 (ref 140–450)
PMV BLD AUTO: 10.7 FL (ref 6–12)
POTASSIUM SERPL-SCNC: 4.1 MMOL/L (ref 3.5–5.2)
RBC # BLD AUTO: 3.03 10*6/MM3 (ref 4.14–5.8)
SODIUM SERPL-SCNC: 134 MMOL/L (ref 136–145)
WBC # BLD AUTO: 11.89 10*3/MM3 (ref 3.4–10.8)

## 2020-12-06 PROCEDURE — 97110 THERAPEUTIC EXERCISES: CPT

## 2020-12-06 PROCEDURE — 85027 COMPLETE CBC AUTOMATED: CPT | Performed by: HOSPITALIST

## 2020-12-06 PROCEDURE — 63710000001 INSULIN GLARGINE PER 5 UNITS: Performed by: HOSPITALIST

## 2020-12-06 PROCEDURE — 82962 GLUCOSE BLOOD TEST: CPT

## 2020-12-06 PROCEDURE — 63710000001 INSULIN LISPRO (HUMAN) PER 5 UNITS: Performed by: ORTHOPAEDIC SURGERY

## 2020-12-06 PROCEDURE — 25010000002 ENOXAPARIN PER 10 MG: Performed by: ORTHOPAEDIC SURGERY

## 2020-12-06 PROCEDURE — 80048 BASIC METABOLIC PNL TOTAL CA: CPT | Performed by: HOSPITALIST

## 2020-12-06 RX ORDER — POLYETHYLENE GLYCOL 3350 17 G/17G
17 POWDER, FOR SOLUTION ORAL DAILY
Status: DISCONTINUED | OUTPATIENT
Start: 2020-12-06 | End: 2020-12-07 | Stop reason: HOSPADM

## 2020-12-06 RX ORDER — FUROSEMIDE 40 MG/1
40 TABLET ORAL DAILY
Status: DISCONTINUED | OUTPATIENT
Start: 2020-12-06 | End: 2020-12-07 | Stop reason: HOSPADM

## 2020-12-06 RX ORDER — INSULIN GLARGINE 100 [IU]/ML
20 INJECTION, SOLUTION SUBCUTANEOUS NIGHTLY
Status: DISCONTINUED | OUTPATIENT
Start: 2020-12-06 | End: 2020-12-07 | Stop reason: HOSPADM

## 2020-12-06 RX ORDER — BISACODYL 5 MG/1
10 TABLET, DELAYED RELEASE ORAL DAILY PRN
Status: DISCONTINUED | OUTPATIENT
Start: 2020-12-06 | End: 2020-12-07 | Stop reason: HOSPADM

## 2020-12-06 RX ADMIN — DOCUSATE SODIUM 50MG AND SENNOSIDES 8.6MG 2 TABLET: 8.6; 5 TABLET, FILM COATED ORAL at 20:53

## 2020-12-06 RX ADMIN — INSULIN LISPRO 5 UNITS: 100 INJECTION, SOLUTION INTRAVENOUS; SUBCUTANEOUS at 11:22

## 2020-12-06 RX ADMIN — HYDROCODONE BITARTRATE AND ACETAMINOPHEN 1 TABLET: 5; 325 TABLET ORAL at 03:34

## 2020-12-06 RX ADMIN — BISACODYL 10 MG: 5 TABLET ORAL at 11:23

## 2020-12-06 RX ADMIN — TAMSULOSIN HYDROCHLORIDE 0.8 MG: 0.4 CAPSULE ORAL at 08:20

## 2020-12-06 RX ADMIN — HYDROCODONE BITARTRATE AND ACETAMINOPHEN 1 TABLET: 5; 325 TABLET ORAL at 11:22

## 2020-12-06 RX ADMIN — INSULIN LISPRO 3 UNITS: 100 INJECTION, SOLUTION INTRAVENOUS; SUBCUTANEOUS at 16:35

## 2020-12-06 RX ADMIN — INSULIN GLARGINE 20 UNITS: 100 INJECTION, SOLUTION SUBCUTANEOUS at 21:56

## 2020-12-06 RX ADMIN — POLYETHYLENE GLYCOL 3350 17 G: 17 POWDER, FOR SOLUTION ORAL at 11:28

## 2020-12-06 RX ADMIN — INSULIN LISPRO 3 UNITS: 100 INJECTION, SOLUTION INTRAVENOUS; SUBCUTANEOUS at 08:19

## 2020-12-06 RX ADMIN — DOCUSATE SODIUM 50MG AND SENNOSIDES 8.6MG 2 TABLET: 8.6; 5 TABLET, FILM COATED ORAL at 08:20

## 2020-12-06 RX ADMIN — CARVEDILOL 12.5 MG: 12.5 TABLET, FILM COATED ORAL at 19:05

## 2020-12-06 RX ADMIN — ENOXAPARIN SODIUM 40 MG: 100 INJECTION SUBCUTANEOUS at 08:20

## 2020-12-06 RX ADMIN — FUROSEMIDE 40 MG: 40 TABLET ORAL at 15:43

## 2020-12-06 RX ADMIN — CARVEDILOL 12.5 MG: 12.5 TABLET, FILM COATED ORAL at 08:20

## 2020-12-06 NOTE — THERAPY TREATMENT NOTE
"Patient Name: Dilip Verma  : 1950    MRN: [id number]                              Today's Date: 2020       Admit Date: 2020    Visit Dx:     ICD-10-CM ICD-9-CM   1. Closed nondisplaced intertrochanteric fracture of left femur, initial encounter (CMS/HCC)  S72.145A 820.21   2. Subungual hematoma of finger, initial encounter  S60.10XA 923.3   3. Fall, initial encounter  W19.XXXA E888.9   4. Abrasion of right forearm, initial encounter  S50.811A 913.0     Patient Active Problem List   Diagnosis   • Anxiety   • Colon polyp   • Diabetes mellitus, type II (CMS/[facility])   • Erectile dysfunction   • Hyperlipidemia   • CAD (coronary artery disease)   • Hx of CABG   • Chronic diastolic CHF (congestive heart failure) (CMS/[facility])   • Hypersomnia due to medical condition   • CSA (central sleep apnea)   • Periodic breathing   • HTN (hypertension)   • Hypoxia   • Closed nondisplaced intertrochanteric fracture of left femur (CMS/[facility])   • History of stroke   • CKD (chronic kidney disease) stage 3, GFR 30-59 ml/min   • Urinary retention   • AMENA (acute kidney injury) (CMS/[facility])   • Acute posthemorrhagic anemia     Past Medical History:   Diagnosis Date   • Anxiety    • Chronic diastolic (congestive) heart failure (CMS/[facility])    • Chronic kidney disease     stones- had lithotripsy   • Colon polyp    • Coronary artery disease     CABG 2019   • Diabetes mellitus, type II (CMS/[facility])    • Erectile dysfunction    • H/O bone density study never   • Hyperlipidemia    • Hypersomnia    • Hypertension    • Kidney stone    • Orthostasis    • Periodic breathing    • Routine eye exam    • Sleep apnea     bipap   • Stroke (CMS/[facility])     \"slight stroke\"     Past Surgical History:   Procedure Laterality Date   • CARDIAC CATHETERIZATION N/A 7/15/2019    Procedure: Coronary angiography;  Surgeon: Carrie Price MD;  Location: [facility] INVASIVE LOCATION;  Service: Cardiovascular   • CARDIAC CATHETERIZATION N/A 7/15/2019    " Procedure: Left Heart Cath;  Surgeon: Carrie Price MD;  Location: [facility] CATH INVASIVE LOCATION;  Service: Cardiovascular   • CARDIAC CATHETERIZATION N/A 7/15/2019    Procedure: Left ventriculography;  Surgeon: Carrie Price MD;  Location: [facility] CATH INVASIVE LOCATION;  Service: Cardiovascular   • CARDIAC CATHETERIZATION  7/15/2019    Procedure: Functional Flow [place];  Surgeon: Carrie Price MD;  Location[facility]ASIVE LOC[facility];  Service: Cardiovascular   • CARDIAC CATHETERIZATION N/A 11/6/2019    Procedure: Right and Left Heart Cath;  Surgeon: Mya Smith MD;  Location[facility]ATH INVASIVE LOC[facility];  Service: Cardiovascular   • CARDIAC CATHETERIZATION N/A 11/6/2019    Procedure: Coronary angiography;  Surgeon: Mya Smith MD;  Location: [facility] CATH INVASIVE LOCATION;  Service: Cardiovascular   • CATARACT EXTRACTION EXTRACAPSULAR W/ INTRAOCULAR LENS IMPLANTATION Bilateral    • COLONOSCOPY  01/2015    dr jimenez   • CORONARY ARTERY BYPASS GRAFT N/A 7/18/2019    Procedure: INTRAOPERATIVE SHOAIB; STERNOTOMY CORONARY ARTERY BYPASS x 3  USING LEFT INTERNAL MAMMARY ARTERY GRAFT UTILIZING ENDOSCOPICALLY HARVESTED RIGHT GREATER SAPHENOUS VEIN AND PRP.;  Surgeon: Bill Devi MD;  Location: [facility] OR;  Service: Cardiothoracic   • DENTAL PROCEDURE      3 surgeries inder implants   • FEMUR IM NAILING/RODDING Left 12/3/2020    Procedure: LEFT HIP INTRAMEDULLARY NAIL;  Surgeon: Niraj Ravi MD;  Location: [facility] MAIN OR;  Service: Orthopedic Spine;  Laterality: Left;   • HERNIA REPAIR      inguinal bilaterally   • KIDNEY STONE SURGERY      lithotripsy     General Information     Row Name 12/06/20 1818          Physical Therapy Time and Intention    Document Type  therapy note (daily note)  -UZIEL     Mode of Treatment  individual therapy;physical therapy  -     Row Name 12/06/20 1818          General Information    Patient Profile Reviewed  yes  -     Existing Precautions/Restrictions   fall;oxygen therapy device and L/min;weight bearing TDWB LLE  -     Row Name 12/06/20 1818          Safety Issues, Functional Mobility    Impairments Affecting Function (Mobility)  balance;coordination;endurance/activity tolerance;range of motion (ROM);strength  -     Comment, Safety Issues/Impairments (Mobility)  improved w/less yelling, moaning, but very rigid in all extremities w/slight mvmnt  -       User Key  (r) = Recorded By, (t) = Taken By, (c) = Cosigned By    Initials Name Provider Type    Michaela Green PTA Physical Therapy Assistant        Mobility     Row Name 12/06/20 1819          Bed Mobility    Scooting/Bridging [place] (Bed Mobility)  2 person assist;moderate assist (50% patient effort);maximum assist (25% patient effort)  -     Supine-Sit [place] (Bed Mobility)  2 person assist;maximum assist (25% patient effort);verbal cues;nonverbal cues (demo/gesture) unable to use rail properly to assist today  -     Sit-Supine [place] (Bed Mobility)  not tested  -     Row Name 12/06/20 1819          Transfers    Comment (Transfers)  placed shoe on R foot, had pt tfer to chair w/ RLE lead-slight diff in pivoting R foot but shoe did benefit maintaining TDWB L  -     Row Name 12/06/20 1819          Bed-Chair Transfer    Bed-Chair [place] (Transfers)  2 person assist;maximum assist (25% patient effort);dependent (less than 25% patient effort)  -     Row Name 12/06/20 1819          Mobility    Extremity Weight-bearing Status  left lower extremity  -     Left Lower Extremity (Weight-bearing Status)  touch down weight-bearing (TDWB)  -       User Key  (r) = Recorded By, (t) = Taken By, (c) = Cosigned By    Initials Name Provider Type    Michaela Green PTA Physical Therapy Assistant        Obj/Interventions    No documentation.       Goals/Plan    No documentation.       Clinical Impression     Row Name 12/06/20 1823          Pain Scale: Numbers Pre/Post-Treatment     Pretreatment Pain Rating  8/10  -JM     Posttreatment Pain Rating  8/10  -JM     Pain Location - Side  Left  -     Pain Location - Orientation  lower  -     Pain Location  extremity  -JM     Pre/Posttreatment Pain Comment  visible swelling in LLE limiting mvmnt , pn meds just given  -     Pain Intervention(s)  Medication (See MAR);Repositioned;Elevated  -     Row Name 12/06/20 1823          Plan of Care Review    Plan of Care Reviewed With  patient  -     Progress  improving  -     Outcome Summary  Pt still heavy assist of 2, requires multiple rest breaks to tolerate session, but pt willing to tfer to chair if able; req assist of 2 for safety and multiple cues to complete tfer safely; plans SNU at [place]  -     Row Name 12/06/20 1823          Therapy Assessment/Plan (PT)    Criteria for Skilled Interventions Met (PT)  yes  -     Row Name 12/06/20 1823          Vital Signs    O2 Delivery Pre Treatment  supplemental O2  -     Row Name 12/06/20 1823          Positioning and Restraints    Pre-Treatment Position  in bed  -     Post Treatment Position  chair  -     In Chair  reclined;call light within reach;encouraged to call for assist;exit alarm on;notified nsg;LLE elevated  -       User Key  (r) = Recorded By, (t) = Taken By, (c) = Cosigned By    Initials Name Provider Type    Michaela Green PTA Physical Therapy Assistant        Outcome Measures     Row Name 12/06/20 1826          How much help from another person do you currently need...    Turning from your back to your side while in flat bed without using bedrails?  2  -JM     Moving from lying on back to sitting on the side of a flat bed without bedrails?  2  -JM     Moving to and from a bed to a chair (including a wheelchair)?  2  -JM     Standing up from a chair using your arms (e.g., wheelchair, bedside chair)?  2  -JM     Climbing 3-5 steps with a railing?  1  -JM     To walk in hospital room?  1  -JM     AM-PAC 6 Clicks Score (PT)   10  -       User Key  (r) = Recorded By, (t) = Taken By, (c) = Cosigned By    Initials Name Provider Type     Michaela Camargo PTA Physical Therapy Assistant        Physical Therapy Education                 Title: PT OT SLP Therapies (Done)     Topic: Physical Therapy (Done)     Point: Mobility training (Done)     Learning Progress Summary           Patient Acceptance, E,TB,D, VU,NR by  at 12/6/2020 1826    Acceptance, E,D, NR by  at 12/5/2020 1804    Acceptance, E, VU,NR by  at 12/4/2020 1319                   Point: Home exercise program (Done)     Learning Progress Summary           Patient Acceptance, E,TB,D, VU,NR by  at 12/6/2020 1826    Acceptance, E,D, NR by  at 12/5/2020 1804    Acceptance, E, VU,NR by  at 12/4/2020 1319                   Point: Body mechanics (Done)     Learning Progress Summary           Patient Acceptance, E,TB,D, VU,NR by  at 12/6/2020 1826    Acceptance, E,D, NR by  at 12/5/2020 1804    Acceptance, E, VU,NR by  at 12/4/2020 1319                   Point: Precautions (Done)     Learning Progress Summary           Patient Acceptance, E,TB,D, VU,NR by  at 12/6/2020 1826    Acceptance, E,D, NR by  at 12/5/2020 1804    Acceptance, E, VU,NR by  at 12/4/2020 1319                               User Key     Initials Effective Dates Name Provider Type [facility] 03/07/18 -  Michaela Camargo PTA Physical Therapy Assistant PT     09/02/20 -  Juana Ortega PT Physical Therapist PT              PT Recommendation and Plan     Plan of Care Reviewed With: patient  Progress: improving  Outcome Summary: Pt still heavy assist of 2, requires multiple rest breaks to tolerate session, but pt willing to tfer to chair if able; req assist of 2 for safety and multiple cues to complete tfer safely; plans SNU at [place]     Time Calculation:   PT Charges     Row Name 12/06/20 1816             Time Calculation    Start Time  1115  -      Stop Time  1148  -      Time  Calculation (min)  33 min  -UZIEL      PT Received On  12/06/20  -UZIEL      PT - Next Appointment  12/07/20  -UZIEL        User Key  (r) = Recorded By, (t) = Taken By, (c) = Cosigned By    Initials Name Provider Type    Michaela Green PTA Physical Therapy Assistant        Therapy Charges for Today     Code Description Service Date Service Provider Modifiers Qty    04089948462 HC PT THER PROC EA 15 MIN 12/5/2020 Michaela Camargo PTA GP 2    14266888394 HC PT THER SUPP EA 15 MIN 12/5/2020 Michaela Camargo PTA GP 2    74754776246 HC PT THER PROC EA 15 MIN 12/6/2020 Michaela Camargo PTA GP 2    93332874268 HC PT THER SUPP EA 15 MIN 12/6/2020 Michaela Camargo PTA GP 2          PT G-Codes  Outcome Measure Options: AM-PAC 6 Clicks Basic Mobility (PT)  AM-PAC 6 Clicks Score (PT): 10    Michaela Camargo PTA  12/6/2020

## 2020-12-06 NOTE — PROGRESS NOTES
"   LOS: 5 days   Patient Care Team:  Vishal Adrian Jr., MD as PCP - General (Internal Medicine)  Morris Cai MD as Consulting Physician (Sleep Medicine)  Michaela Hylton MD as Consulting Physician (Cardiology)  Hardy Banerjee MD as Consulting Physician (Ophthalmology)  Chula Christianson MD as Consulting Physician (Ophthalmology)  Jason Sherman MD (Endocrinology)  Perla Mayen MD as Consulting Physician (Nephrology)  Federico Hebert MD as Consulting Physician (Pulmonary Disease)    Chief Complaint: no BM for several days    Subjective     Pt c/o pain in left hip. No BM. Tolerating diet. Voiding well. No SOA or CP.       Subjective:  Symptoms:  Stable.  He reports malaise and weakness.  No shortness of breath, cough, chest pain, headache, chest pressure, anorexia, diarrhea or anxiety.    Diet:  Adequate intake.  No nausea or vomiting.    Activity level: Impaired due to pain.    Pain:  He complains of pain that is moderate.  He reports pain is improving.  Pain is well controlled and requiring pain medication.        History taken from: patient chart RN    Objective     Vital Signs  Temp:  [97.1 °F (36.2 °C)-100.1 °F (37.8 °C)] 97.8 °F (36.6 °C)  Heart Rate:  [67-72] 68  Resp:  [16-17] 16  BP: (118-131)/(63-81) 122/66    Objective:  General Appearance:  Comfortable and in no acute distress.    Vital signs: (most recent): Blood pressure 122/66, pulse 68, temperature 97.8 °F (36.6 °C), temperature source Temporal, resp. rate 16, height 175.3 cm (69\"), weight 94.3 kg (208 lb), SpO2 97 %.  Vital signs are normal.  No fever.    Output: Producing urine and no stool output.    HEENT: Normal HEENT exam.    Lungs:  Normal effort and normal respiratory rate.  Breath sounds clear to auscultation.  He is not in respiratory distress.    Heart: Normal rate.  Regular rhythm.    Abdomen: Abdomen is soft and distended.  Bowel sounds are normal.   There is no abdominal tenderness.     Extremities: " There is dependent edema.    Pulses: Distal pulses are intact.    Neurological: Patient is alert and oriented to person, place and time.  Normal strength.  Patient has normal muscle tone.    Pupils:  Pupils are equal.   Skin:  Warm and dry.  No rash.             Results Review:     I reviewed the patient's new clinical results.  I reviewed the patient's other test results and agree with the interpretation  I personally viewed and interpreted the patient's EKG/Telemetry data  Discussed with pt and RN    Results from last 7 days   Lab Units 12/06/20 0519 12/05/20 0721 12/04/20 0538 12/03/20  0537 12/02/20  0208 12/01/20  1556   WBC 10*3/mm3 11.89* 13.15* 15.81* 15.17* 15.76* 10.36   HEMOGLOBIN g/dL 9.6* 9.5* 10.9* 14.0 14.9 15.5   PLATELETS 10*3/mm3 191 156 159 166 203 197     Results from last 7 days   Lab Units 12/06/20 0519 12/05/20 0721 12/04/20 0538 12/03/20 0537 12/02/20 0208 12/01/20  1556   SODIUM mmol/L 134* 130* 136 135* 138 139   POTASSIUM mmol/L 4.1 4.4 4.8 4.7 4.7 4.5   CHLORIDE mmol/L 97* 93* 99 98 102 99   CO2 mmol/L 26.8 27.7 26.5 27.3 28.5 30.9*   BUN mg/dL 56* 57* 52* 46* 32* 29*   CREATININE mg/dL 1.08 1.35* 1.35* 1.59* 1.21 1.23   CALCIUM mg/dL 8.4* 8.5* 8.1* 8.9 8.9 9.2   Estimated Creatinine Clearance: 71.1 mL/min (by C-G formula based on SCr of 1.08 mg/dL).    Medication Review: reviewed and adjusted    Assessment/Plan       Closed nondisplaced intertrochanteric fracture of left femur (CMS/HCC)    Diabetes mellitus, type II (CMS/[facility])    CAD (coronary artery disease)    Chronic diastolic CHF (congestive heart failure) (CMS/[facility])    HTN (hypertension)    Hypoxia    History of stroke    CKD (chronic kidney disease) stage 3, GFR 30-59 ml/min    Urinary retention    AMENA (acute kidney injury) (CMS/HCC)    Acute posthemorrhagic anemia          Plan:   (   70 y.o. male admitted with closed nondisplaced intertrochanteric fracture of left femur.     · Status post ORIF of left hip fracture 12/3/2020  by Dr. Ravi.  PT/OT continues, TDWB left lower extremity, follow-up with orthopedic surgery in 2 to 3 weeks.  · Postop blood loss anemia (expected).  Continue to monitor Hgb. Stable today.  · Some atelectasis on x-ray 12/4 contributing to hypoxia.  Encouraged IS use, pulmonary hygiene. On RA today. Minimize opioid analgesia.  · Leukocytosis: reactive due to surgery and blood loss anemia.  Improving, continue to monitor.  · DM2: sugars a bit high here, A1c 9.5, will increase dose of long-acting insulin tonight  · CAD/chronic diastolic CHF/HTN: Stable at the moment, will restart his home Lasix  · History of stroke  · AMENA/CKD3: creatinine much improved--only 1.08 today  · Constipation: adjust bowel regimen again today. D/w RN.  ·   ·   · Anticipate discharge to SNU facility when precert obtained    Full code  Lovenox for DVT ppx for 3 weeks postop).       Amador Valverde MD  12/06/20  14:31 EST    Time: 30min

## 2020-12-07 VITALS
HEART RATE: 67 BPM | TEMPERATURE: 97.6 F | RESPIRATION RATE: 18 BRPM | BODY MASS INDEX: 30.81 KG/M2 | WEIGHT: 208 LBS | DIASTOLIC BLOOD PRESSURE: 81 MMHG | OXYGEN SATURATION: 95 % | SYSTOLIC BLOOD PRESSURE: 132 MMHG | HEIGHT: 69 IN

## 2020-12-07 LAB
ALBUMIN SERPL-MCNC: 3.2 G/DL (ref 3.5–5.2)
ALBUMIN/GLOB SERPL: 1 G/DL
ALP SERPL-CCNC: 59 U/L (ref 39–117)
ALT SERPL W P-5'-P-CCNC: 9 U/L (ref 1–41)
ANION GAP SERPL CALCULATED.3IONS-SCNC: 10.3 MMOL/L (ref 5–15)
AST SERPL-CCNC: 19 U/L (ref 1–40)
BASOPHILS # BLD AUTO: 0.03 10*3/MM3 (ref 0–0.2)
BASOPHILS NFR BLD AUTO: 0.2 % (ref 0–1.5)
BILIRUB SERPL-MCNC: 0.9 MG/DL (ref 0–1.2)
BUN SERPL-MCNC: 45 MG/DL (ref 8–23)
BUN/CREAT SERPL: 43.7 (ref 7–25)
CALCIUM SPEC-SCNC: 8.4 MG/DL (ref 8.6–10.5)
CHLORIDE SERPL-SCNC: 98 MMOL/L (ref 98–107)
CO2 SERPL-SCNC: 27.7 MMOL/L (ref 22–29)
CREAT SERPL-MCNC: 1.03 MG/DL (ref 0.76–1.27)
DEPRECATED RDW RBC AUTO: 42.8 FL (ref 37–54)
EOSINOPHIL # BLD AUTO: 0.1 10*3/MM3 (ref 0–0.4)
EOSINOPHIL NFR BLD AUTO: 0.8 % (ref 0.3–6.2)
ERYTHROCYTE [DISTWIDTH] IN BLOOD BY AUTOMATED COUNT: 12.9 % (ref 12.3–15.4)
GFR SERPL CREATININE-BSD FRML MDRD: 71 ML/MIN/1.73
GLOBULIN UR ELPH-MCNC: 3.2 GM/DL
GLUCOSE BLDC GLUCOMTR-MCNC: 144 MG/DL (ref 70–130)
GLUCOSE BLDC GLUCOMTR-MCNC: 155 MG/DL (ref 70–130)
GLUCOSE SERPL-MCNC: 168 MG/DL (ref 65–99)
HCT VFR BLD AUTO: 31.2 % (ref 37.5–51)
HGB BLD-MCNC: 10.5 G/DL (ref 13–17.7)
IMM GRANULOCYTES # BLD AUTO: 0.07 10*3/MM3 (ref 0–0.05)
IMM GRANULOCYTES NFR BLD AUTO: 0.6 % (ref 0–0.5)
LYMPHOCYTES # BLD AUTO: 0.95 10*3/MM3 (ref 0.7–3.1)
LYMPHOCYTES NFR BLD AUTO: 7.7 % (ref 19.6–45.3)
MCH RBC QN AUTO: 31.2 PG (ref 26.6–33)
MCHC RBC AUTO-ENTMCNC: 33.7 G/DL (ref 31.5–35.7)
MCV RBC AUTO: 92.6 FL (ref 79–97)
MONOCYTES # BLD AUTO: 1.1 10*3/MM3 (ref 0.1–0.9)
MONOCYTES NFR BLD AUTO: 8.9 % (ref 5–12)
NEUTROPHILS NFR BLD AUTO: 10.13 10*3/MM3 (ref 1.7–7)
NEUTROPHILS NFR BLD AUTO: 81.8 % (ref 42.7–76)
NRBC BLD AUTO-RTO: 0.2 /100 WBC (ref 0–0.2)
PLATELET # BLD AUTO: 251 10*3/MM3 (ref 140–450)
PMV BLD AUTO: 10.5 FL (ref 6–12)
POTASSIUM SERPL-SCNC: 4.3 MMOL/L (ref 3.5–5.2)
PROT SERPL-MCNC: 6.4 G/DL (ref 6–8.5)
RBC # BLD AUTO: 3.37 10*6/MM3 (ref 4.14–5.8)
SODIUM SERPL-SCNC: 136 MMOL/L (ref 136–145)
WBC # BLD AUTO: 12.38 10*3/MM3 (ref 3.4–10.8)

## 2020-12-07 PROCEDURE — 97110 THERAPEUTIC EXERCISES: CPT

## 2020-12-07 PROCEDURE — 80053 COMPREHEN METABOLIC PANEL: CPT | Performed by: HOSPITALIST

## 2020-12-07 PROCEDURE — 63710000001 INSULIN LISPRO (HUMAN) PER 5 UNITS: Performed by: ORTHOPAEDIC SURGERY

## 2020-12-07 PROCEDURE — 82962 GLUCOSE BLOOD TEST: CPT

## 2020-12-07 PROCEDURE — 25010000002 ENOXAPARIN PER 10 MG: Performed by: ORTHOPAEDIC SURGERY

## 2020-12-07 PROCEDURE — 85025 COMPLETE CBC W/AUTO DIFF WBC: CPT | Performed by: HOSPITALIST

## 2020-12-07 RX ORDER — TAMSULOSIN HYDROCHLORIDE 0.4 MG/1
0.8 CAPSULE ORAL DAILY
Qty: 30 CAPSULE
Start: 2020-12-07 | End: 2021-01-07 | Stop reason: SDUPTHER

## 2020-12-07 RX ORDER — AMOXICILLIN 250 MG
2 CAPSULE ORAL 2 TIMES DAILY
Start: 2020-12-07 | End: 2021-01-03

## 2020-12-07 RX ORDER — HYDROCODONE BITARTRATE AND ACETAMINOPHEN 5; 325 MG/1; MG/1
1 TABLET ORAL EVERY 8 HOURS PRN
Qty: 9 TABLET | Refills: 0 | Status: SHIPPED | OUTPATIENT
Start: 2020-12-07 | End: 2021-04-07

## 2020-12-07 RX ORDER — FUROSEMIDE 40 MG/1
40 TABLET ORAL DAILY
Start: 2020-12-07 | End: 2021-03-11 | Stop reason: SDUPTHER

## 2020-12-07 RX ORDER — INSULIN GLARGINE 100 [IU]/ML
20 INJECTION, SOLUTION SUBCUTANEOUS NIGHTLY
Refills: 12
Start: 2020-12-07 | End: 2021-04-07

## 2020-12-07 RX ORDER — ACETAMINOPHEN 325 MG/1
650 TABLET ORAL EVERY 4 HOURS PRN
Start: 2020-12-07 | End: 2021-03-24

## 2020-12-07 RX ORDER — POLYETHYLENE GLYCOL 3350 17 G/17G
17 POWDER, FOR SOLUTION ORAL DAILY
Start: 2020-12-07 | End: 2021-01-03

## 2020-12-07 RX ADMIN — DOCUSATE SODIUM 50MG AND SENNOSIDES 8.6MG 2 TABLET: 8.6; 5 TABLET, FILM COATED ORAL at 07:36

## 2020-12-07 RX ADMIN — INSULIN LISPRO 3 UNITS: 100 INJECTION, SOLUTION INTRAVENOUS; SUBCUTANEOUS at 07:36

## 2020-12-07 RX ADMIN — POLYETHYLENE GLYCOL 3350 17 G: 17 POWDER, FOR SOLUTION ORAL at 07:37

## 2020-12-07 RX ADMIN — FUROSEMIDE 40 MG: 40 TABLET ORAL at 07:37

## 2020-12-07 RX ADMIN — CARVEDILOL 12.5 MG: 12.5 TABLET, FILM COATED ORAL at 07:37

## 2020-12-07 RX ADMIN — ENOXAPARIN SODIUM 40 MG: 100 INJECTION SUBCUTANEOUS at 07:36

## 2020-12-07 RX ADMIN — TAMSULOSIN HYDROCHLORIDE 0.8 MG: 0.4 CAPSULE ORAL at 07:37

## 2020-12-07 RX ADMIN — BISACODYL 10 MG: 5 TABLET ORAL at 07:37

## 2020-12-07 RX ADMIN — HYDROCODONE BITARTRATE AND ACETAMINOPHEN 1 TABLET: 5; 325 TABLET ORAL at 04:03

## 2020-12-07 NOTE — PLAN OF CARE
Problem: Adult Inpatient Plan of Care  Goal: Plan of Care Review  Recent Flowsheet Documentation  Taken 12/4/2020 1314 by Juana Ortega PT  Plan of Care Reviewed With: patient  Outcome Summary: Pt is a 71 yo male s/p L IM nailing following a fall leading to subtrochanter fx and femoral neck fx. Pt reports IND at baseline, owns equipment and chair lift, lives with wife who can provide assistance. Pt is TDWB on L and was educated on this precaution. Pt yelling and moaning in pain at attempts for bed mobility - PT educated pt to relax and not resist movement to decrease the pain. Pt performs supine<>sit with max A x2 and performs sit<>stand with mod A x2. Pt with posterior lean in standing and did not fully adhere to WB precaution. Pt may benefit from skilled PT to address functional mobility deficits and increase functional independence. Recommending d/c to SNF at this time.    Patient was intermittently wearing a face mask during this therapy encounter. Therapist used appropriate personal protective equipment including eye protection, mask, and gloves.  Mask used was standard procedure mask. Appropriate PPE was worn during the entire therapy session. Hand hygiene was completed before and after therapy session. Patient is not in enhanced droplet precautions. Volodymyr Mcdonald present.    
Goal Outcome Evaluation:  Plan of Care Reviewed With: patient  Progress: improving  Outcome Summary: GOOD PAIN CONTROL WITH IV AND PO, SURGERY DELAYED TODAY BUT CLEARED NOW AND SCHEDULED FOR TOMORROW, VSS, NVI, EDUCATED ON GLUCOSE MEDS AND MONITORING  
Goal Outcome Evaluation:  Plan of Care Reviewed With: patient  Progress: improving  Outcome Summary: Pt POD 3 from a Left Hip IM Nailing. Pt pain has been well controlled with PO pain meds. Pt has been educated on blood sugar monitoring and use of IS. VSS. Dressing CDI. Plan is to discharge to [facility] pending precert.  
Goal Outcome Evaluation:  Plan of Care Reviewed With: patient  Progress: improving  Outcome Summary: Pt remains stable. Norco given for pain. Awaiting eval from physical therapy. No new skin issues, accumax in place. Voiding per brief. Confused after sx but seems to be improving. Discussed holding dilaudid as pt becomes confused with that. Education provided on diabetes management with glucose monitoring and insulin therapy. Hip dressing remains intact. CCP consulted to discuss d/c plan. Will continue to monitor.  
Goal Outcome Evaluation:  Plan of Care Reviewed With: patient  Progress: improving  Outcome Summary: Pt still heavy assist of 2, requires multiple rest breaks to tolerate session, but pt willing to tfer to chair if able; req assist of 2 for safety and multiple cues to complete tfer safely; plans SNU at [place]    SHEFALI Meredith present    Patient was wearing a face mask during this therapy encounter. Therapist used appropriate personal protective equipment including eye protection, mask, and gloves.  Mask used was standard procedure mask. Appropriate PPE was worn during the entire therapy session. Hand hygiene was completed before and after therapy session. Patient is not in enhanced droplet precautions.    
Goal Outcome Evaluation:  Plan of Care Reviewed With: patient  Progress: improving  Outcome Summary: pt currently in surgery, iv dilaudid did make patient a little confused before surgery- normally a&ox4, was voiding per urn prior to surgery, educated on bp meds and monitoring  
Goal Outcome Evaluation:  Plan of Care Reviewed With: patient  Progress: no change    
Goal Outcome Evaluation:  Plan of Care Reviewed With: patient  Progress: no change   PT POD2 Left femur IM nailing, VSS, afebrile, O2 2L NC,pain controlled on po pain medication, Norco changed from every 6hr to every 8hrs, TTWB, accuchecks with s/s insulin.   
Goal Outcome Evaluation:  Plan of Care Reviewed With: patient  Progress: no change  Outcome Summary: Paitent is POD1 of L hip IM nail. VSS and patient is voiding incontinently, PVR bladder scan being monitored and with decision to straight cath based on algorithm. Pain managed with po prn meds. Patient unable to get OOB today, but turns well. O2 weans to 2 L NC. Educated on skin injury prevention and BG monitoring/med mangement. Patient is alert and oriented, but is forgetful and does not always follow conversations appropriately. Plan for snf at d/c when stable.  
Goal Outcome Evaluation:  Plan of Care Reviewed With: patient  Progress: no change  Outcome Summary: Pt admitted from the ER after a fall. Diagnosed with a left hip fracture. LLE is shortened and externally rotated. Pt rates his pain a 5/10. Pt is currently NPO in anticioation of surgery. Pt has ortho and cardiology consults called in. Orders for consent, but pt wanted to speak to a dr before he signed. Pt has a skin tear to the left pointer finger. Pt had a CT scan of the hip done after he was on the unit to verufy fracture. Preliminary results verify fracture. Covid pending. Pt educated on the importance of monitoring blood pressures related to comorbidity of HTN. Pt voiced understanding. Pt is resting at this time, will continue to monitor.  
Goal Outcome Evaluation:  Plan of Care Reviewed With: patient  Progress: no change  Outcome Summary: Pt admitted on 12/1 after a fall. Dx with a left hip fracture. Pt currently NPO for surgery. Going for surgery a 230pm. Pt continues with IV pain meds that provide  some relief. Pt has a very low tolerance for pain. Pt constantly moaning and groaning in pain but rate his pain a 3/10 when asked. Pt voids via the urinal, but also had an incontinent episode. Pt has a history of having nightmares as per his wife. Pt educated on the importance of monitoring blood sugars related to comorbidity of DM. Pt voiced understanding. Pt is resting at this time, will continue to monitor.  
Goal Outcome Evaluation:  Plan of Care Reviewed With: patient  Progress: no change  Outcome Summary: Pt agreeable to PT but anticipates pain w/slightest movement and screams out or moans throughout rx; pt did say it went the best today on standing that he has done since his stay ; min/mod 2 STS, w/rwx and elevated bed; placed shoe on R foot to assist w/TDWB; plans SNU at [place]    Nazario Johnson PT Tech present    Patient was wearing a face mask during this therapy encounter. Therapist used appropriate personal protective equipment including eye protection, mask, and gloves.  Mask used was standard procedure mask. Appropriate PPE was worn during the entire therapy session. Hand hygiene was completed before and after therapy session. Patient is not in enhanced droplet precautions.    
Goal Outcome Evaluation:  Plan of Care Reviewed With: patient  Progress: no change  Outcome Summary: Pt remains stable. Working with physical therapy, able to sit to stand with max A. No new skin issues, dressing to LLE is CDI. Pain well controlled. PVRs have been low. PW in place to help with incontinence. Accumax in place. Remains on 2L NC. No confusion through the night. Plans to d/c to rehab. Will continue to monitor.  
Goal Outcome Evaluation:  Plan of Care Reviewed With: patient  Progress: no change  Outcome Summary: Pt remains stable. Working with physical therapy, bed mobility thus far. TDWB to LLE. Dressing is CDI. No new skin issues. Voiding per PW. No BM since 12/1, pericolace given. Remains on 2L, IS encouraged. Norco given for pain. Education provided on diabetes management with glucose monitoring and insulin therapy. Precerpt pending for rehab upon d/c. Will continue to monitor.  
Goal Outcome Evaluation:  Plan of Care Reviewed With: patient  Progress: no change  Outcome Summary: patient requesting to sleeping in chair this evening, voiding incontinently and with primofit, pain controlled at rest but increased with any movement, refuses to stand or attempt to ambulate this shift, unable to wean O2 at this time, educated on b/p and glucose monitoring  
Goal Outcome Evaluation:  Plan of Care Reviewed With: patient  Progress: no change  Outcome Summary: pt with a difficult situation with L hip IMN and TDWB status, pt has R UE weakness from previous CVA, dec ability to use walker due to this and also with dec  on L UE, pt also with significant pain, will need SNF at discharge    Patient was intermittently wearing a face mask during this therapy encounter. Therapist used appropriate personal protective equipment including eye protection, mask, and gloves.  Mask used was standard procedure mask. Appropriate PPE was worn during the entire therapy session. Hand hygiene was completed before and after therapy session. Patient is not in enhanced droplet precautions.     PT jesus alberto Barcenas was present  
no fever and no chills.

## 2020-12-07 NOTE — NURSING NOTE
Pictures of left index finger wound on admission, they were not added to LDA.    Rosa Maria Guthrie RN

## 2020-12-07 NOTE — PROGRESS NOTES
"   LOS: 6 days   Patient Care Team:  Vishal Adrian Jr., MD as PCP - General (Internal Medicine)  Morris Cai MD as Consulting Physician (Sleep Medicine)  Michaela Hylton MD as Consulting Physician (Cardiology)  Hardy Banerjee MD as Consulting Physician (Ophthalmology)  Chula Christianson MD as Consulting Physician (Ophthalmology)  Jason Sherman MD (Endocrinology)  Perla Mayen MD as Consulting Physician (Nephrology)  Federico Hebert MD as Consulting Physician (Pulmonary Disease)    Chief Complaint: left hip pain    Subjective     Pt c/o pain in left hip. Still no BM. Tolerating diet. Voiding well. No SOA or CP. Requiring O2 at night and when napping--uses BiPAP at home whenever sleeping.      Subjective:  Symptoms:  Improved.  He reports weakness.  No shortness of breath, malaise, cough, chest pain, headache, chest pressure, anorexia, diarrhea or anxiety.    Diet:  Adequate intake.  No nausea or vomiting.    Activity level: Impaired due to pain.    Pain:  He complains of pain that is mild.  He reports pain is improving.  Pain is well controlled and requiring pain medication.        History taken from: patient chart RN    Objective     Vital Signs  Temp:  [97.1 °F (36.2 °C)-97.8 °F (36.6 °C)] 97.1 °F (36.2 °C)  Heart Rate:  [67-80] 67  Resp:  [15-18] 18  BP: (118-145)/(66-74) 118/68    Objective:  General Appearance:  Comfortable and in no acute distress.    Vital signs: (most recent): Blood pressure 118/68, pulse 67, temperature 97.1 °F (36.2 °C), temperature source Skin, resp. rate 18, height 175.3 cm (69\"), weight 94.3 kg (208 lb), SpO2 93 %.  Vital signs are normal.  No fever.    Output: Producing urine and no stool output.    HEENT: Normal HEENT exam.    Lungs:  Normal effort and normal respiratory rate.  Breath sounds clear to auscultation.  He is not in respiratory distress.    Heart: Normal rate.  Regular rhythm.    Abdomen: Abdomen is soft and distended.  Bowel sounds " are normal.   There is no abdominal tenderness.     Extremities: There is dependent edema.  (Dressing to left index finger)  Pulses: Distal pulses are intact.    Neurological: Patient is alert and oriented to person, place and time.  Normal strength.  Patient has normal muscle tone.    Pupils:  Pupils are equal.   Skin:  Warm and dry.  No rash.             Results Review:     I reviewed the patient's new clinical results.  I reviewed the patient's other test results and agree with the interpretation  Discussed with pt and RN    Results from last 7 days   Lab Units 12/07/20  0529 12/06/20  0519 12/05/20  0721 12/04/20  0538 12/03/20  0537 12/02/20  0208 12/01/20  1556   WBC 10*3/mm3 12.38* 11.89* 13.15* 15.81* 15.17* 15.76* 10.36   HEMOGLOBIN g/dL 10.5* 9.6* 9.5* 10.9* 14.0 14.9 15.5   PLATELETS 10*3/mm3 251 191 156 159 166 203 197     Results from last 7 days   Lab Units 12/07/20  0529 12/06/20  0519 12/05/20  0721 12/04/20  0538 12/03/20  0537 12/02/20  0208 12/01/20  1556   SODIUM mmol/L 136 134* 130* 136 135* 138 139   POTASSIUM mmol/L 4.3 4.1 4.4 4.8 4.7 4.7 4.5   CHLORIDE mmol/L 98 97* 93* 99 98 102 99   CO2 mmol/L 27.7 26.8 27.7 26.5 27.3 28.5 30.9*   BUN mg/dL 45* 56* 57* 52* 46* 32* 29*   CREATININE mg/dL 1.03 1.08 1.35* 1.35* 1.59* 1.21 1.23   CALCIUM mg/dL 8.4* 8.4* 8.5* 8.1* 8.9 8.9 9.2   Estimated Creatinine Clearance: 74.5 mL/min (by C-G formula based on SCr of 1.03 mg/dL).    Medication Review: reviewed    Assessment/Plan       Closed nondisplaced intertrochanteric fracture of left femur (CMS/HCC)    Diabetes mellitus, type II (CMS/HCC)    CAD (coronary artery disease)    Chronic diastolic CHF (congestive heart failure) (CMS/[facility])    HTN (hypertension)    Hypoxia    History of stroke    CKD (chronic kidney disease) stage 3, GFR 30-59 ml/min    Urinary retention    AMENA (acute kidney injury) (CMS/[facility])    Acute posthemorrhagic anemia          Plan:   (   70 y.o. male admitted with closed nondisplaced  intertrochanteric fracture of left femur after mechanical fall at his PCP's office.     · Status post ORIF of left hip fracture 12/3/2020 by Dr. Ravi.  PT/OT continues, TDWB left lower extremity, follow-up with orthopedic surgery in 2 to 3 weeks.  · Postop blood loss anemia (expected).  Continue to monitor Hgb. Stable today at 10.5.  · Some atelectasis on x-ray 12/4 contributing to hypoxia.  Encouraged IS use, pulmonary hygiene. On RA today when awake, requires 2L/min when sleeping--this is in keeping with his known severe COCO. Minimize opioid analgesia.  · Leukocytosis: reactive due to surgery and blood loss anemia. Stable, continue to monitor.  · DM2: sugars a bit high here, A1c 9.5, increased dose of long-acting insulin and sugars acceptable today.  · CAD/chronic diastolic CHF/HTN: Stable at the moment, restarted his home Lasix 12/6 and Cr looks fine.  · History of stroke  · AMENA/CKD3: creatinine much improved--only 1.03 today  · Constipation: Fleet enema today. Continue bowel regimen. D/w RN.  · Left index finger partial nail avulsion with subungual hematoma: s/p electrocautery trephination in ER. Will ask RN to change dressing daily. Photo to be placed in chart today.  ·   · Anticipate discharge to SNU facility when precert obtained--maybe later today    Full code  Lovenox for DVT ppx for 3 weeks postop).       Amador Valverde MD  12/07/20  09:42 EST    Time: 35min

## 2020-12-07 NOTE — PROGRESS NOTES
Continued Stay Note  [facility]     Patient Name: Dilip Verma  MRN: [id number]  Today's Date: 12/7/2020    Admit Date: 12/1/2020    Discharge Plan     Row Name 12/07/20 1057       Plan    Plan  [facility] -- Accepted & Pre-cert Obtained 12/7    Patient/Family in Agreement with Plan  yes    Plan Comments  Spoke with Ann Marie/Bob who has accepted the patient, recieved pre-cert and will have a SNF bed available for the patient today at [facility]. Updated LUC Rodarte/SUSY Barbour who will discuss with Dr. Valverde. Scheduled a [facility] to transport the patient today at 1330 (confirmation #: [id number]). Updated the patient's nurse/LUC Crane who is agreeable. Updated the patient and Ann Marie/Bob who are both agreeable. Plan is for the patient to d/c to [facility] today via [facility] today at 1330. CCP following.        Discharge Codes    No documentation.             Maru Francisco RN

## 2020-12-07 NOTE — THERAPY TREATMENT NOTE
"Patient Name: Dilip Verma  : 1950    MRN: [id number]                              Today's Date: 2020       Admit Date: 2020    Visit Dx:     ICD-10-CM ICD-9-CM   1. Closed nondisplaced intertrochanteric fracture of left femur, initial encounter (CMS/Formerly [facility])  S72.145A 820.21   2. Subungual hematoma of finger, initial encounter  S60.10XA 923.3   3. Fall, initial encounter  W19.XXXA E888.9   4. Abrasion of right forearm, initial encounter  S50.811A 913.0   5. Closed nondisplaced intertrochanteric fracture of left femur with routine healing, subsequent encounter  S72.145D V54.13     Patient Active Problem List   Diagnosis   • Anxiety   • Colon polyp   • Diabetes mellitus, type II (CMS/Formerly [facility])   • Erectile dysfunction   • Hyperlipidemia   • CAD (coronary artery disease)   • Hx of CABG   • Chronic diastolic CHF (congestive heart failure) (CMS/Formerly [facility])   • Hypersomnia due to medical condition   • CSA (central sleep apnea)   • Periodic breathing   • HTN (hypertension)   • Hypoxia   • Closed nondisplaced intertrochanteric fracture of left femur (CMS/Formerly [facility])   • History of stroke   • CKD (chronic kidney disease) stage 3, GFR 30-59 ml/min   • Urinary retention   • AMENA (acute kidney injury) (CMS/Formerly [facility])   • Acute posthemorrhagic anemia     Past Medical History:   Diagnosis Date   • Anxiety    • Chronic diastolic (congestive) heart failure (CMS/Formerly [facility])    • Chronic kidney disease     stones- had lithotripsy   • Colon polyp    • Coronary artery disease     CABG 2019   • Diabetes mellitus, type II (CMS/Formerly [facility])    • Erectile dysfunction    • H/O bone density study never   • Hyperlipidemia    • Hypersomnia    • Hypertension    • Kidney stone    • Orthostasis    • Periodic breathing    • Routine eye exam 2015   • Sleep apnea     bipap   • Stroke (CMS/[facility])     \"slight stroke\"     Past Surgical History:   Procedure Laterality Date   • CARDIAC CATHETERIZATION N/A 7/15/2019    Procedure: Coronary angiography;  Surgeon: Carrie Price, " MD;  Location: [facility] CATH INVASIVE LOCATION;  Service: Cardiovascular   • CARDIAC CATHETERIZATION N/A 7/15/2019    Procedure: Left Heart Cath;  Surgeon: Carrie Price MD;  Location: [facility] CATH INVASIVE LOCATION;  Service: Cardiovascular   • CARDIAC CATHETERIZATION N/A 7/15/2019    Procedure: Left ventriculography;  Surgeon: Carrie Price MD;  Location: [facility] CATH INVASIVE LOCATION;  Service: Cardiovascular   • CARDIAC CATHETERIZATION  7/15/2019    Procedure: Functional Flow Waucoma;  Surgeon: Carrie Price MD;  Location: [facility] CATH INVASIVE LOCATION;  Service: Cardiovascular   • CARDIAC CATHETERIZATION N/A 11/6/2019    Procedure: Right and Left Heart Cath;  Surgeon: Mya Smith MD;  Location: [facility] CATH INVASIVE LOCATION;  Service: Cardiovascular   • CARDIAC CATHETERIZATION N/A 11/6/2019    Procedure: Coronary angiography;  Surgeon: Mya Smith MD;  Location: [facility] CATH INVASIVE LOCATION;  Service: Cardiovascular   • CATARACT EXTRACTION EXTRACAPSULAR W/ INTRAOCULAR LENS IMPLANTATION Bilateral    • COLONOSCOPY  01/2015    dr [email or web address]   • CORONARY ARTERY BYPASS GRAFT N/A 7/18/2019    Procedure: INTRAOPERATIVE SHOAIB; STERNOTOMY CORONARY ARTERY BYPASS x 3  USING LEFT INTERNAL MAMMARY ARTERY GRAFT UTILIZING ENDOSCOPICALLY HARVESTED RIGHT GREATER SAPHENOUS VEIN AND PRP.;  Surgeon: Bill Devi MD;  Location: [facility];  Service: Cardiothoracic   • DENTAL PROCEDURE      3 surgeries inder implants   • FEMUR IM NAILING/RODDING Left 12/3/2020    Procedure: LEFT HIP INTRAMEDULLARY NAIL;  Surgeon: Niraj Ravi MD;  Location: [facility];  Service: Orthopedic Spine;  Laterality: Left;   • HERNIA REPAIR      inguinal bilaterally   • KIDNEY STONE SURGERY      lithotripsy     General Information     Row Name 12/07/20 1113          Physical Therapy Time and Intention    Document Type  therapy note (daily note)  -PC     Mode of Treatment  physical therapy  -PC     Row Name 12/07/20 1113          General  "Information    Existing Precautions/Restrictions  fall;oxygen therapy device and L/min;weight bearing  -     Row Name 12/07/20 1113          Cognition    Orientation Status (Cognition)  oriented x 4  -PC       User Key  (r) = Recorded By, (t) = Taken By, (c) = Cosigned By    Initials Name Provider Type    PC Gemma Hamilton, PT Physical Therapist        Mobility     Row Name 12/07/20 1114          Bed Mobility    Scooting/Bridging [place] (Bed Mobility)  maximum assist (25% patient effort);2 person assist  -PC     Sit-Supine [place] (Bed Mobility)  maximum assist (25% patient effort);2 person assist  -PC     Assistive Device (Bed Mobility)  bed rails;draw sheet  -     Row Name 12/07/20 1114          Transfers    Comment (Transfers)  did not use shoe this time because it inhibited his ability to pivot on right. pt unable to come to full stand, pivoted from chair to BSC, then BSC to bed, pt unable to grasp a walker due to CVA with R side weakness, and he also has some dec  on L hand \" locked fingers\"  -PC     Row Name 12/07/20 1114          Bed-Chair Transfer    Bed-Chair [place] (Transfers)  maximum assist (25% patient effort);2 person assist  -PC     Row Name 12/07/20 1114          Sit-Stand Transfer    Sit-Stand [place] (Transfers)  maximum assist (25% patient effort);2 person assist  -PC     Row Name 12/07/20 1114          Mobility    Extremity Weight-bearing Status  left lower extremity  -PC     Left Lower Extremity (Weight-bearing Status)  toe touch weight-bearing (TTWB)  -PC       User Key  (r) = Recorded By, (t) = Taken By, (c) = Cosigned By    Initials Name Provider Type    PC Gemma Hamilton PT Physical Therapist        Obj/Interventions    No documentation.       Goals/Plan    No documentation.       Clinical Impression     Row Name 12/07/20 1126          Pain    Additional Documentation  Pain Scale: Numbers Pre/Post-Treatment (Group)  -     Row Name 12/07/20 1126          " Pain Scale: Numbers Pre/Post-Treatment    Pretreatment Pain Rating  8/10  -PC     Posttreatment Pain Rating  8/10  -PC     Pain Location - Side  Left  -PC     Pain Location  hip  -PC     Pain Intervention(s)  Medication (See MAR);Repositioned  -PC     Row Name 12/07/20 1126          Plan of Care Review    Plan of Care Reviewed With  patient  -PC     Outcome Summary  pt with a difficult situation with L hip IMN and TDWB status, pt has R UE weakness from previous CVA, dec ability to use walker due to this and also with dec  on L UE, pt also with significant pain, will need SNF at discharge  -PC     Row Name 12/07/20 1126          Positioning and Restraints    Pre-Treatment Position  sitting in chair/recliner  -PC     Post Treatment Position  bed  -PC     In Bed  supine;call light within reach;encouraged to call for assist;exit alarm on  -PC       User Key  (r) = Recorded By, (t) = Taken By, (c) = Cosigned By    Initials Name Provider Type    PC Gemma Hamilton PT Physical Therapist        Outcome Measures     Row Name 12/07/20 1128          How much help from another person do you currently need...    Turning from your back to your side while in flat bed without using bedrails?  2  -PC     Moving from lying on back to sitting on the side of a flat bed without bedrails?  2  -PC     Moving to and from a bed to a chair (including a wheelchair)?  2  -PC     Standing up from a chair using your arms (e.g., wheelchair, bedside chair)?  2  -PC     Climbing 3-5 steps with a railing?  1  -PC     To walk in hospital room?  1  -PC     AM-PAC 6 Clicks Score (PT)  10  -PC       User Key  (r) = Recorded By, (t) = Taken By, (c) = Cosigned By    Initials Name Provider Type    PC Gemma Hamilton, PT Physical Therapist        Physical Therapy Education                 Title: PT OT SLP Therapies (Done)     Topic: Physical Therapy (Done)     Point: Mobility training (Done)     Learning Progress Summary           Patient Acceptance,  E,D, DU,NR by PC at 12/7/2020 1129    Acceptance, E,TB,D, VU,NR by  at 12/6/2020 1826    Acceptance, E,D, NR by  at 12/5/2020 1804    Acceptance, E, VU,NR by  at 12/4/2020 1319                   Point: Home exercise program (Done)     Learning Progress Summary           Patient Acceptance, E,TB,D, VU,NR by  at 12/6/2020 1826    Acceptance, E,D, NR by  at 12/5/2020 1804    Acceptance, E, VU,NR by CF at 12/4/2020 1319                   Point: Body mechanics (Done)     Learning Progress Summary           Patient Acceptance, E,D, DU,NR by PC at 12/7/2020 1129    Acceptance, E,TB,D, VU,NR by  at 12/6/2020 1826    Acceptance, E,D, NR by  at 12/5/2020 1804    Acceptance, E, VU,NR by  at 12/4/2020 1319                   Point: Precautions (Done)     Learning Progress Summary           Patient Acceptance, E,D, DU,NR by PC at 12/7/2020 1129    Acceptance, E,TB,D, VU,NR by  at 12/6/2020 1826    Acceptance, E,D, NR by  at 12/5/2020 1804    Acceptance, E, VU,NR by  at 12/4/2020 1319                               User Key     Initials Effective Dates Name Provider Type Discipline     04/03/18 -  Gemma Hamilton PT Physical Therapist PT     03/07/18 -  Michaela Camargo PTA Physical Therapy Assistant PT     09/02/20 -  Juana Ortega PT Physical Therapist PT              PT Recommendation and Plan     Plan of Care Reviewed With: patient  Outcome Summary: pt with a difficult situation with L hip IMN and TDWB status, pt has R UE weakness from previous CVA, dec ability to use walker due to this and also with dec  on L UE, pt also with significant pain, will need SNF at discharge     Time Calculation:   PT Charges     Row Name 12/07/20 0173             Time Calculation    Start Time  -- 5272-6832, 9178-4987  -PC      PT Received On  12/07/20  -      PT - Next Appointment  12/08/20  -         Time Calculation- PT    Total Timed Code Minutes- PT  28 minute(s)  -PC        User Key  (r) = Recorded  By, (t) = Taken By, (c) = Cosigned By    Initials Name Provider Type    PC Gemma Hamilton, PT Physical Therapist        Therapy Charges for Today     Code Description Service Date Service Provider Modifiers Qty    13111301563 HC PT THER PROC EA 15 MIN 12/7/2020 Gemma Hamilton, PT GP 2    26212651758 HC PT THER SUPP EA 15 MIN 12/7/2020 Gemma Hamilton, PT GP 2          PT G-Codes  Outcome Measure Options: AM-PAC 6 Clicks Basic Mobility (PT)  AM-PAC 6 Clicks Score (PT): 10    Gemma Hamilton, PT  12/7/2020

## 2020-12-07 NOTE — PROGRESS NOTES
Case Management Discharge Note      Final Note: Pt d/c'd to [facility]. Transported by [facility].         Selected Continued Care - Discharged on 12/7/2020 Admission date: 12/1/2020 - Discharge disposition: Skilled Nursing Facility (DC - External)    Destination Coordination complete    Service Provider Selected Services Address Phone Fax Patient Preferred    [facility]  [facility] [address] [address] [phone] --          Durable Medical Equipment    No services have been selected for the patient.              Dialysis/Infusion    No services have been selected for the patient.              Home Medical Care    No services have been selected for the patient.              Therapy    No services have been selected for the patient.              Community Resources    No services have been selected for the patient.                       Final Discharge Disposition Code: 03 - skilled nursing facility (SNF)

## 2020-12-07 NOTE — DISCHARGE SUMMARY
Patient Name: Dilip Verma  : 1950  MRN: [id number]    Date of Admission: 2020  Date of Discharge:  2020  Primary Care Physician: Vishal Adrian Jr., MD      Chief Complaint:   Hip Pain      Discharge Diagnoses     Active Hospital Problems    Diagnosis  POA   • **Closed nondisplaced intertrochanteric fracture of left femur (CMS/[facility]) [S72.145A]  Yes   • Acute posthemorrhagic anemia [D62]  No   • AMENA (acute kidney injury) (CMS/[facility]) [N17.9]  Yes   • Urinary retention [R33.9]  Yes   • History of stroke [Z86.73]  Not Applicable   • CKD (chronic kidney disease) stage 3, GFR 30-59 ml/min [N18.30]  Yes   • HTN (hypertension) [I10]  Yes   • Hypoxia [R09.02]  Yes   • Chronic diastolic CHF (congestive heart failure) (CMS/[facility]) [I50.32]  Yes   • CAD (coronary artery disease) [I25.10]  Yes   • Diabetes mellitus, type II (CMS/[facility]) [E11.9]  Yes      Resolved Hospital Problems   No resolved problems to display.        Hospital Course     70 y.o. male admitted with closed nondisplaced intertrochanteric fracture of left femur after mechanical fall at his PCP's office. Please see below for details of admission:     ·           Status post ORIF of left hip fracture 12/3/2020 by Dr. Ravi.  PT/OT continues, TDWB left lower extremity, follow-up with orthopedic surgery in 2 to 3 weeks.  ·           Postop blood loss anemia (expected).  Continue to monitor Hgb. Stable today at 10.5.  ·           Some atelectasis on x-ray  contributing to hypoxia.  Encouraged IS use, pulmonary hygiene. On RA today when awake, requires 2L/min when sleeping--this is in keeping with his known severe COCO. Minimize opioid analgesia.  ·           Leukocytosis: reactive due to surgery and blood loss anemia. Stable, continue to monitor.  ·           DM2: sugars a bit high here, A1c 9.5, increased dose of long-acting insulin and sugars acceptable today. Continue SSI coverage.  ·           CAD/chronic diastolic CHF/HTN: Stable at the  moment, restarted his home Lasix 12/6 and Cr looks fine.  ·           History of stroke  ·           AMENA/CKD3: creatinine much improved--only 1.03 today  ·           Constipation: Continue current bowel regimen. D/w RN--he was able to have large BM later this AM.  ·           Left index finger partial nail avulsion with subungual hematoma: s/p electrocautery trephination in ER. Will require daily dressing changes and wound care at facility  ·               Full code  Lovenox for DVT ppx for 2 weeks postop    Day of Discharge     Subjective:  Pt c/o pain in left hip. RN reports good BM this AM. Tolerating diet. Voiding well. No SOA or CP. Requiring O2 at night and when napping--uses BiPAP at home whenever sleeping.    Review of Systems    Physical Exam:  Temp:  [97.1 °F (36.2 °C)-97.6 °F (36.4 °C)] 97.6 °F (36.4 °C)  Heart Rate:  [67-80] 67  Resp:  [15-18] 18  BP: (118-145)/(68-81) 132/81  Body mass index is 30.72 kg/m².  Physical Exam  General Appearance:  Comfortable and in no acute distress.    Vital signs: Vital signs are normal.  No fever.    Output: Producing urine and no stool output.    HEENT: Normal HEENT exam.    Lungs:  Normal effort and normal respiratory rate.  Breath sounds clear to auscultation.  He is not in respiratory distress.    Heart: Normal rate.  Regular rhythm.    Abdomen: Abdomen is soft and distended.  Bowel sounds are normal.   There is no abdominal tenderness.     Extremities: There is dependent edema.  (Dressing to left index finger)  Pulses: Distal pulses are intact.    Neurological: Patient is alert and oriented to person, place and time.  Normal strength.  Patient has normal muscle tone.    Pupils:  Pupils are equal.   Skin:  Warm and dry.  No rash.      Consultants     Consult Orders (all) (From admission, onward)     Start     Ordered    12/04/20 0052  Inpatient Case Management  Consult  Once     Provider:  (Not yet assigned)    12/04/20 0052 12/02/20 0402   Inpatient Case Management  Consult  Once     Provider:  (Not yet assigned)    12/02/20 0408    12/02/20 0409  Inpatient Diabetes Educator Consult  Once     Provider:  (Not yet assigned)    12/02/20 0408    12/01/20 2340  Inpatient Cardiology Consult  Once     Specialty:  Cardiology  Provider:  Michaela Hylton MD    12/01/20 2340    12/01/20 1945  Inpatient Orthopedic Surgery Consult  Once     Specialty:  Orthopedic Surgery  Provider:  Castro Mccray MD    12/01/20 1944 12/01/20 1813  [facility] (on-call MD unless specified) Details  Once     Specialty:  Hospitalist  Provider:  (Not yet assigned)    12/01/20 1812 12/01/20 1813  Ortho (on-call MD unless specified)  Once     Specialty:  Orthopedic Surgery  Provider:  (Not yet assigned)    12/01/20 1812              Procedures     Imaging Results (All)     Procedure Component Value Units Date/Time    XR Chest 1 View [[id number]] Collected: 12/04/20 1711     Updated: 12/04/20 1724    Narrative:      XR CHEST 1 VW-  12/04/2020     HISTORY: Increased oxygen demands.     Heart size is within normal limits. There is a bandlike area of  increased density in the left lung base consistent with atelectasis.  There are some mild atelectasis of the right lung base overlying the  right hemidiaphragm as well.     Upper lungs appear clear. Sternotomy wires are seen.       Impression:      Mild bilateral lower lung atelectasis.  2. No other significant findings are noted.     This report was finalized on 12/4/2020 5:18 PM by Dr. Tad Godfrey M.D.       XR Hip With or Without Pelvis 2 - 3 View Left [[id number]] Collected: 12/03/20 1914     Updated: 12/03/20 1924    Narrative:      LEFT HIP/FEMUR     HISTORY: IM nail placement left femur.     FINDINGS: Six views were obtained for intraoperative localization and  control during gamma nail fixation of the left proximal femur. Imaging  demonstrates 2 distal interlocking screws and a proximal Cerclage wire.  The  fluoroscopy time was 2 minutes 35 seconds.     This report was finalized on 12/3/2020 7:21 PM by Dr. Zechariah Falcon M.D.       FL C Arm During Surgery [[id number]] Resulted: 12/03/20 1843     Updated: 12/03/20 1843    Narrative:      This procedure was auto-finalized with no dictation required.    CT Lower Extremity Left Without Contrast [[id number]] Collected: 12/02/20 0820     Updated: 12/02/20 0922    Narrative:      LEFT HIP CT WITHOUT CONTRAST     HISTORY: Left proximal femur fracture. Hip pain.     TECHNIQUE: Axial CT from the left hip to the left knee with multiplanar  reformatted images is provided and correlated with pelvis and left hip  x-rays acquired earlier in the evening.     Radiation dose reduction techniques were utilized, including automated  exposure control and exposure modulation based on body size.     FINDINGS: The visualized bones of the pelvis are intact. There is a  comminuted, reverse intertrochanteric proximal femur fracture with mild  varus angulation and impaction. There is fracture extension across the  femoral neck extending as far proximal as the left femoral head neck  junction superiorly. Posteriorly and posteroinferiorly, fracture planes  extend across the mid femoral neck. Distal fracture planes extend as far  as the posterior aspect of the proximal femoral shaft about 4 cm below  the level of the fragmented lesser trochanter. There is no evidence of  underlying bone lesion.     There is surrounding soft tissue edema and minimal adjacent hematoma, as  expected. Edema extends along the quadriceps musculature to the mid to  distal thigh level. No significant intramuscular hematoma is present.  The middle and distal portions of the femur are intact. The proximal  tibia and fibula and the patella are intact. There is no joint effusion  at the knee.     There is some mild underlying osteoarthritic change along the posterior  aspect of the left hip. Joint surfaces at the knee appear  preserved.     No intrapelvic lesion is present.       Impression:      Comminuted, mildly impacted and displaced reverse  intertrochanteric left proximal femur fracture with proximal fracture  extension is far cephalad as the femoral head and neck junction  superiorly. There is expected surrounding soft tissue edema. There is no  fracture involving the middle or distal portions of the femoral shaft  nor is there evidence of acute injury around the knee.     This report was finalized on 12/2/2020 9:19 AM by Dr. Amador Denton M.D.       CT Head Without Contrast [[id number]] Collected: 12/01/20 1831     Updated: 12/01/20 1836    Narrative:      HEAD CT WITHOUT CONTRAST     HISTORY: Fall. Trauma to the for head.     TECHNIQUE: Noncontrast head CT is provided and correlated brain MRI  07/16/2019 and head CT angiogram 07/15/2019.     Radiation dose reduction techniques were utilized, including automated  exposure control and exposure modulation based on body size.     FINDINGS: The ventricles are normal in caliber. There is some patchy low  density in the cerebral white matter which appears similar to that seen  previously. No intracranial hemorrhage is present. Bones of the skull  are intact. Orbital structures appear grossly normal. There is some mild  mucosal thickening in the paranasal sinuses but there is no paranasal  sinus fluid. Mastoid air cells and middle ears are clear.       Impression:      Stable chronic changes in the brain. No acute abnormality  identified on head CT.     This report was finalized on 12/1/2020 6:33 PM by Dr. Amador Denton M.D.       XR Hand 3+ View Left [[id number]] Collected: 12/01/20 1801     Updated: 12/01/20 1808    Narrative:      XR HAND 3+ VW LEFT-     INDICATIONS: Trauma to the left index finger     TECHNIQUE: 3 views of the left hand     COMPARISON: None available     FINDINGS:     No acute fracture, erosion, or dislocation is identified. A ring on the  fourth finger,  overlying dressing material, and a wristwatch partly  obscure. Soft tissue swelling of the second finger may be evidence of  injury, inflammation, infection, correlate clinically. Follow-up/further  evaluation can be obtained as indications persist. Arterial  calcification is present.             Impression:         As described.           This report was finalized on 12/1/2020 6:05 PM by Dr. Gabe Zimmer M.D.       XR Chest 1 View [[id number]] Collected: 12/01/20 1759     Updated: 12/01/20 1804    Narrative:      XR CHEST 1 VW-     HISTORY: Male who is 70 years-old,  preoperative evaluation     TECHNIQUE: Frontal view of the chest     COMPARISON: 02/29/2020     FINDINGS: Heart, mediastinum and pulmonary vasculature are unremarkable.  Sternotomy wires are noted. Minimal atelectasis at the right lung base.  No pleural effusion, or pneumothorax is seen, limited by supine  positioning. No acute osseous process.       Impression:      Minimal likely atelectasis at the right lung base.     This report was finalized on 12/1/2020 6:01 PM by Dr. Gabe Zimmer M.D.       XR Hip With or Without Pelvis 2 - 3 View Left [[id number]] Collected: 12/01/20 1757     Updated: 12/01/20 1802    Narrative:      XR HIP W OR WO PELVIS 2-3 VIEW LEFT-     INDICATIONS: Trauma     TECHNIQUE: Frontal view of the pelvis, 2 views of the left hip     COMPARISON: None available     FINDINGS:     Comminuted, angulated left intertrochanteric fracture is noted, with  apparent extension of fracture lucency into the femoral neck. No other  fractures are identified. No dislocation. Mild bilateral hip joint space  narrowing. Degenerative changes are seen in the partly included lumbar  spine.       Impression:         Proximal left femur fracture.     This report was finalized on 12/1/2020 5:59 PM by Dr. Gabe Zimmer M.D.             Pertinent Labs     Results from last 7 days   Lab Units 12/07/20  0529 12/06/20  0519 12/05/20  0721  12/04/20  0538   WBC 10*3/mm3 12.38* 11.89* 13.15* 15.81*   HEMOGLOBIN g/dL 10.5* 9.6* 9.5* 10.9*   PLATELETS 10*3/mm3 251 191 156 159     Results from last 7 days   Lab Units 12/07/20  0529 12/06/20  0519 12/05/20 0721 12/04/20  0538   SODIUM mmol/L 136 134* 130* 136   POTASSIUM mmol/L 4.3 4.1 4.4 4.8   CHLORIDE mmol/L 98 97* 93* 99   CO2 mmol/L 27.7 26.8 27.7 26.5   BUN mg/dL 45* 56* 57* 52*   CREATININE mg/dL 1.03 1.08 1.35* 1.35*   GLUCOSE mg/dL 168* 174* 165* 161*   Estimated Creatinine Clearance: 74.5 mL/min (by C-G formula based on SCr of 1.03 mg/dL).  Results from last 7 days   Lab Units 12/07/20 0529 12/01/20  1556   ALBUMIN g/dL 3.20* 3.90   BILIRUBIN mg/dL 0.9 0.6   ALK PHOS U/L 59 71   AST (SGOT) U/L 19 17   ALT (SGPT) U/L 9 20     Results from last 7 days   Lab Units 12/07/20  0529 12/06/20  0519 12/05/20 0721 12/04/20  0538  12/01/20  1556   CALCIUM mg/dL 8.4* 8.4* 8.5* 8.1*   < > 9.2   ALBUMIN g/dL 3.20*  --   --   --   --  3.90    < > = values in this interval not displayed.       Results from last 7 days   Lab Units 12/02/20  0208   TROPONIN T ng/mL 0.017           Invalid input(s): LDLCALC        Test Results Pending at Discharge       Discharge Details        Discharge Medications      New Medications      Instructions Start Date   acetaminophen 325 MG tablet  Commonly known as: TYLENOL   650 mg, Oral, Every 4 Hours PRN      enoxaparin 40 MG/0.4ML solution syringe  Commonly known as: LOVENOX   40 mg, Subcutaneous, Every 24 Hours      HYDROcodone-acetaminophen 5-325 MG per tablet  Commonly known as: NORCO   1 tablet, Oral, Every 8 Hours PRN      insulin glargine 100 UNIT/ML injection  Commonly known as: LANTUS  Replaces: TOUJEO MAX SOLOSTAR SC   20 Units, Subcutaneous, Nightly      polyethylene glycol 17 g packet  Commonly known as: MIRALAX   17 g, Oral, Daily      sennosides-docusate 8.6-50 MG per tablet  Commonly known as: PERICOLACE   2 tablets, Oral, 2 Times Daily      tamsulosin 0.4 MG  capsule 24 hr capsule  Commonly known as: FLOMAX   0.8 mg, Oral, Daily         Changes to Medications      Instructions Start Date   furosemide 40 MG tablet  Commonly known as: LASIX  What changed: when to take this   40 mg, Oral, Daily      insulin lispro 100 UNIT/ML injection  Commonly known as: humaLOG  What changed:   how much to take  when to take this  reasons to take this  additional instructions  Another medication with the same name was removed. Continue taking this medication, and follow the directions you see here.   0-14 Units, Subcutaneous, 3 Times Daily Before Meals         Continue These Medications      Instructions Start Date   aspirin 81 MG EC tablet   81 mg, Oral, Daily      carvedilol 12.5 MG tablet  Commonly known as: COREG   12.5 mg, Oral, 2 Times Daily With Meals      melatonin 3 MG tablet   3 mg, Oral, Nightly      Vitamin D 125 MCG (5000 UT) capsule capsule   5,000 Units, Oral, Daily         Stop These Medications    AndroGel 50 MG/5GM (1%) gel gel  Generic drug: testosterone     gabapentin 300 MG capsule  Commonly known as: NEURONTIN     NON FORMULARY     pramipexole 0.25 MG tablet  Commonly known as: MIRAPEX     sildenafil 20 MG tablet  Commonly known as: REVATIO     TOUJEO MAX SOLOSTAR SC  Replaced by: insulin glargine 100 UNIT/ML injection            Allergies   Allergen Reactions   • Ace Inhibitors Angioedema   • Clonidine Derivatives Unknown - High Severity     Passes out   • Losartan Angioedema     Angioneurotic edema   • Amlodipine Swelling   • Ativan [Lorazepam] Irritability   • Minoxidil Confusion   • Tetracycline Other (See Comments)     bliisters in mouth           Discharge Disposition:  Skilled Nursing Facility (DC - External)    Discharge Diet:  Diet Order   Procedures   • Diet Regular; Consistent Carbohydrate       Discharge Activity:     TDWB left lower extremity per Ortho and PT    CODE STATUS:    Code Status and Medical Interventions:   Ordered at: 12/01/20 1944     Code  Status:    CPR     Medical Interventions (Level of Support Prior to Arrest):    Full       Future Appointments   Date Time Provider Department Center   1/7/2021  9:30 AM Danii Dennison APRN [facility] None   5/17/2021 10:30 AM RODRIGUE CT 3 BH [facility]     Additional Instructions for the Follow-ups that You Need to Schedule     Discharge Follow-up with PCP   As directed       Currently Documented PCP:    Vishal Adrian Jr., MD    PCP Phone Number:    [phone]     Follow Up Details: Dr. Camilo (PCP) after dc from facility         Discharge Follow-up with Specified Provider: Dr. Ravi (Ortho); 2 Weeks   As directed      To: Dr. Ravi (Ortho)    Follow Up: 2 Weeks            Contact information for follow-up providers     Niraj Ravi MD Follow up in 2 week(s).    Specialty: Orthopedic Surgery  Contact information:  [address]  [facility] [id number] [phone]             Vishal Adrian Jr., MD .    Specialty: Internal Medicine  Why: Dr. Camilo (PCP) after dc from facility  Contact information:  [address] [facility] [address]  [phone]                   Contact information for after-discharge care     Destination     [facility] .    Service: Skilled Nursing  Contact information:  [address] [facility] [address] [phone]                             Additional Instructions for the Follow-ups that You Need to Schedule     Discharge Follow-up with PCP   As directed       Currently Documented PCP:    Vishal Adrian Jr., MD    PCP Phone Number:    [phone]     Follow Up Details: Dr. Camilo (PCP) after dc from facility         Discharge Follow-up with Specified Provider: Dr. Ravi (Ortho); 2 Weeks   As directed      To: Dr. Ravi (Ortho)    Follow Up: 2 Weeks           Time Spent on Discharge:  Greater than 30 minutes      Amador Valverde MD  [facility]  12/07/20  11:23 EST

## 2020-12-10 ENCOUNTER — TELEPHONE (OUTPATIENT)
Dept: FAMILY MEDICINE CLINIC | Facility: CLINIC | Age: 70
End: 2020-12-10

## 2020-12-16 ENCOUNTER — APPOINTMENT (OUTPATIENT)
Dept: GENERAL RADIOLOGY | Facility: HOSPITAL | Age: 70
End: 2020-12-16

## 2020-12-16 ENCOUNTER — HOSPITAL ENCOUNTER (INPATIENT)
Facility: HOSPITAL | Age: 70
LOS: 6 days | Discharge: HOME | End: 2020-12-22
Attending: EMERGENCY MEDICINE | Admitting: HOSPITALIST

## 2020-12-16 DIAGNOSIS — N39.0 COMPLICATED UTI (URINARY TRACT INFECTION): Primary | ICD-10-CM

## 2020-12-16 DIAGNOSIS — E87.1 HYPONATREMIA: ICD-10-CM

## 2020-12-16 DIAGNOSIS — E87.5 HYPERKALEMIA: ICD-10-CM

## 2020-12-16 DIAGNOSIS — N17.9 AKI (ACUTE KIDNEY INJURY) (HCC): ICD-10-CM

## 2020-12-16 LAB
ALBUMIN SERPL-MCNC: 2.9 G/DL (ref 3.5–5.2)
ALBUMIN/GLOB SERPL: 0.8 G/DL
ALP SERPL-CCNC: 119 U/L (ref 39–117)
ALT SERPL W P-5'-P-CCNC: 30 U/L (ref 1–41)
ANION GAP SERPL CALCULATED.3IONS-SCNC: 11.6 MMOL/L (ref 5–15)
ANION GAP SERPL CALCULATED.3IONS-SCNC: 9.7 MMOL/L (ref 5–15)
AST SERPL-CCNC: 31 U/L (ref 1–40)
B PARAPERT DNA SPEC QL NAA+PROBE: NOT DETECTED
B PERT DNA SPEC QL NAA+PROBE: NOT DETECTED
BACTERIA UR QL AUTO: ABNORMAL /HPF
BASOPHILS # BLD AUTO: 0.06 10*3/MM3 (ref 0–0.2)
BASOPHILS NFR BLD AUTO: 0.2 % (ref 0–1.5)
BILIRUB SERPL-MCNC: 2.7 MG/DL (ref 0–1.2)
BILIRUB UR QL STRIP: NEGATIVE
BUN SERPL-MCNC: 33 MG/DL (ref 8–23)
BUN SERPL-MCNC: 34 MG/DL (ref 8–23)
BUN/CREAT SERPL: 13.8 (ref 7–25)
BUN/CREAT SERPL: 14.5 (ref 7–25)
C PNEUM DNA NPH QL NAA+NON-PROBE: NOT DETECTED
CALCIUM SPEC-SCNC: 8.5 MG/DL (ref 8.6–10.5)
CALCIUM SPEC-SCNC: 8.8 MG/DL (ref 8.6–10.5)
CHLORIDE SERPL-SCNC: 92 MMOL/L (ref 98–107)
CHLORIDE SERPL-SCNC: 92 MMOL/L (ref 98–107)
CLARITY UR: ABNORMAL
CO2 SERPL-SCNC: 25.4 MMOL/L (ref 22–29)
CO2 SERPL-SCNC: 29.3 MMOL/L (ref 22–29)
COLOR UR: ABNORMAL
CREAT SERPL-MCNC: 2.34 MG/DL (ref 0.76–1.27)
CREAT SERPL-MCNC: 2.4 MG/DL (ref 0.76–1.27)
D-LACTATE SERPL-SCNC: 2 MMOL/L (ref 0.5–2)
DEPRECATED RDW RBC AUTO: 45.8 FL (ref 37–54)
EOSINOPHIL # BLD AUTO: 0 10*3/MM3 (ref 0–0.4)
EOSINOPHIL NFR BLD AUTO: 0 % (ref 0.3–6.2)
ERYTHROCYTE [DISTWIDTH] IN BLOOD BY AUTOMATED COUNT: 13.3 % (ref 12.3–15.4)
FLUAV SUBTYP SPEC NAA+PROBE: NOT DETECTED
FLUBV RNA ISLT QL NAA+PROBE: NOT DETECTED
GFR SERPL CREATININE-BSD FRML MDRD: 27 ML/MIN/1.73
GFR SERPL CREATININE-BSD FRML MDRD: 28 ML/MIN/1.73
GLOBULIN UR ELPH-MCNC: 3.5 GM/DL
GLUCOSE BLDC GLUCOMTR-MCNC: 234 MG/DL (ref 70–130)
GLUCOSE BLDC GLUCOMTR-MCNC: 242 MG/DL (ref 70–130)
GLUCOSE BLDC GLUCOMTR-MCNC: 247 MG/DL (ref 70–130)
GLUCOSE BLDC GLUCOMTR-MCNC: 274 MG/DL (ref 70–130)
GLUCOSE SERPL-MCNC: 229 MG/DL (ref 65–99)
GLUCOSE SERPL-MCNC: 305 MG/DL (ref 65–99)
GLUCOSE UR STRIP-MCNC: NEGATIVE MG/DL
HADV DNA SPEC NAA+PROBE: NOT DETECTED
HCOV 229E RNA SPEC QL NAA+PROBE: NOT DETECTED
HCOV HKU1 RNA SPEC QL NAA+PROBE: NOT DETECTED
HCOV NL63 RNA SPEC QL NAA+PROBE: NOT DETECTED
HCOV OC43 RNA SPEC QL NAA+PROBE: NOT DETECTED
HCT VFR BLD AUTO: 30.1 % (ref 37.5–51)
HGB BLD-MCNC: 9.7 G/DL (ref 13–17.7)
HGB UR QL STRIP.AUTO: ABNORMAL
HMPV RNA NPH QL NAA+NON-PROBE: NOT DETECTED
HPIV1 RNA SPEC QL NAA+PROBE: NOT DETECTED
HPIV2 RNA SPEC QL NAA+PROBE: NOT DETECTED
HPIV3 RNA NPH QL NAA+PROBE: NOT DETECTED
HPIV4 P GENE NPH QL NAA+PROBE: NOT DETECTED
HYALINE CASTS UR QL AUTO: ABNORMAL /LPF
IMM GRANULOCYTES # BLD AUTO: 0.36 10*3/MM3 (ref 0–0.05)
IMM GRANULOCYTES NFR BLD AUTO: 1.5 % (ref 0–0.5)
KETONES UR QL STRIP: ABNORMAL
LEUKOCYTE ESTERASE UR QL STRIP.AUTO: ABNORMAL
LYMPHOCYTES # BLD AUTO: 0.51 10*3/MM3 (ref 0.7–3.1)
LYMPHOCYTES NFR BLD AUTO: 2.1 % (ref 19.6–45.3)
M PNEUMO IGG SER IA-ACNC: NOT DETECTED
MCH RBC QN AUTO: 30.6 PG (ref 26.6–33)
MCHC RBC AUTO-ENTMCNC: 32.2 G/DL (ref 31.5–35.7)
MCV RBC AUTO: 95 FL (ref 79–97)
MONOCYTES # BLD AUTO: 0.77 10*3/MM3 (ref 0.1–0.9)
MONOCYTES NFR BLD AUTO: 3.1 % (ref 5–12)
NEUTROPHILS NFR BLD AUTO: 22.96 10*3/MM3 (ref 1.7–7)
NEUTROPHILS NFR BLD AUTO: 93.1 % (ref 42.7–76)
NITRITE UR QL STRIP: NEGATIVE
NRBC BLD AUTO-RTO: 0 /100 WBC (ref 0–0.2)
PH UR STRIP.AUTO: <=5 [PH] (ref 5–8)
PLATELET # BLD AUTO: 308 10*3/MM3 (ref 140–450)
PMV BLD AUTO: 10.4 FL (ref 6–12)
POTASSIUM SERPL-SCNC: 4.9 MMOL/L (ref 3.5–5.2)
POTASSIUM SERPL-SCNC: 5.4 MMOL/L (ref 3.5–5.2)
PROT SERPL-MCNC: 6.4 G/DL (ref 6–8.5)
PROT UR QL STRIP: ABNORMAL
QT INTERVAL: 401 MS
RBC # BLD AUTO: 3.17 10*6/MM3 (ref 4.14–5.8)
RBC # UR: ABNORMAL /HPF
REF LAB TEST METHOD: ABNORMAL
RHINOVIRUS RNA SPEC NAA+PROBE: NOT DETECTED
RSV RNA NPH QL NAA+NON-PROBE: NOT DETECTED
SARS-COV-2 RNA NPH QL NAA+NON-PROBE: NOT DETECTED
SODIUM SERPL-SCNC: 129 MMOL/L (ref 136–145)
SODIUM SERPL-SCNC: 131 MMOL/L (ref 136–145)
SP GR UR STRIP: 1.02 (ref 1–1.03)
SQUAMOUS #/AREA URNS HPF: ABNORMAL /HPF
UROBILINOGEN UR QL STRIP: ABNORMAL
WBC # BLD AUTO: 24.66 10*3/MM3 (ref 3.4–10.8)
WBC UR QL AUTO: ABNORMAL /HPF

## 2020-12-16 PROCEDURE — 25010000002 HYDROMORPHONE PER 4 MG: Performed by: EMERGENCY MEDICINE

## 2020-12-16 PROCEDURE — 25010000002 ONDANSETRON PER 1 MG: Performed by: EMERGENCY MEDICINE

## 2020-12-16 PROCEDURE — 63710000001 INSULIN REGULAR HUMAN PER 5 UNITS: Performed by: EMERGENCY MEDICINE

## 2020-12-16 PROCEDURE — 71045 X-RAY EXAM CHEST 1 VIEW: CPT

## 2020-12-16 PROCEDURE — 82962 GLUCOSE BLOOD TEST: CPT

## 2020-12-16 PROCEDURE — 83605 ASSAY OF LACTIC ACID: CPT | Performed by: EMERGENCY MEDICINE

## 2020-12-16 PROCEDURE — 81001 URINALYSIS AUTO W/SCOPE: CPT | Performed by: EMERGENCY MEDICINE

## 2020-12-16 PROCEDURE — 87186 SC STD MICRODIL/AGAR DIL: CPT | Performed by: EMERGENCY MEDICINE

## 2020-12-16 PROCEDURE — 87086 URINE CULTURE/COLONY COUNT: CPT | Performed by: EMERGENCY MEDICINE

## 2020-12-16 PROCEDURE — 87040 BLOOD CULTURE FOR BACTERIA: CPT | Performed by: EMERGENCY MEDICINE

## 2020-12-16 PROCEDURE — 87077 CULTURE AEROBIC IDENTIFY: CPT | Performed by: EMERGENCY MEDICINE

## 2020-12-16 PROCEDURE — 80053 COMPREHEN METABOLIC PANEL: CPT | Performed by: EMERGENCY MEDICINE

## 2020-12-16 PROCEDURE — 0202U NFCT DS 22 TRGT SARS-COV-2: CPT | Performed by: EMERGENCY MEDICINE

## 2020-12-16 PROCEDURE — 25010000002 CEFTRIAXONE PER 250 MG: Performed by: EMERGENCY MEDICINE

## 2020-12-16 PROCEDURE — 93010 ELECTROCARDIOGRAM REPORT: CPT | Performed by: INTERNAL MEDICINE

## 2020-12-16 PROCEDURE — 99285 EMERGENCY DEPT VISIT HI MDM: CPT

## 2020-12-16 PROCEDURE — 85025 COMPLETE CBC W/AUTO DIFF WBC: CPT | Performed by: EMERGENCY MEDICINE

## 2020-12-16 PROCEDURE — 93005 ELECTROCARDIOGRAM TRACING: CPT

## 2020-12-16 RX ORDER — NICOTINE POLACRILEX 4 MG
15 LOZENGE BUCCAL
Status: DISCONTINUED | OUTPATIENT
Start: 2020-12-16 | End: 2020-12-17

## 2020-12-16 RX ORDER — CEFTRIAXONE SODIUM 1 G/50ML
1 INJECTION, SOLUTION INTRAVENOUS ONCE
Status: COMPLETED | OUTPATIENT
Start: 2020-12-16 | End: 2020-12-16

## 2020-12-16 RX ORDER — HYDROMORPHONE HYDROCHLORIDE 1 MG/ML
0.5 INJECTION, SOLUTION INTRAMUSCULAR; INTRAVENOUS; SUBCUTANEOUS ONCE
Status: COMPLETED | OUTPATIENT
Start: 2020-12-16 | End: 2020-12-16

## 2020-12-16 RX ORDER — ALPRAZOLAM 0.25 MG/1
0.25 TABLET ORAL EVERY 4 HOURS PRN
Status: DISCONTINUED | OUTPATIENT
Start: 2020-12-16 | End: 2020-12-22 | Stop reason: HOSPADM

## 2020-12-16 RX ORDER — DEXTROSE MONOHYDRATE 25 G/50ML
25 INJECTION, SOLUTION INTRAVENOUS
Status: DISCONTINUED | OUTPATIENT
Start: 2020-12-16 | End: 2020-12-17

## 2020-12-16 RX ORDER — ACETAMINOPHEN 325 MG/1
650 TABLET ORAL EVERY 6 HOURS PRN
Status: DISCONTINUED | OUTPATIENT
Start: 2020-12-16 | End: 2020-12-22 | Stop reason: HOSPADM

## 2020-12-16 RX ORDER — CEFTRIAXONE SODIUM 1 G/50ML
1 INJECTION, SOLUTION INTRAVENOUS EVERY 24 HOURS
Status: DISCONTINUED | OUTPATIENT
Start: 2020-12-17 | End: 2020-12-17

## 2020-12-16 RX ORDER — SODIUM CHLORIDE 9 MG/ML
75 INJECTION, SOLUTION INTRAVENOUS CONTINUOUS
Status: DISCONTINUED | OUTPATIENT
Start: 2020-12-17 | End: 2020-12-18

## 2020-12-16 RX ORDER — CEFDINIR 300 MG/1
300 CAPSULE ORAL 2 TIMES DAILY
COMMUNITY
End: 2020-12-22 | Stop reason: HOSPADM

## 2020-12-16 RX ORDER — SODIUM CHLORIDE 0.9 % (FLUSH) 0.9 %
10 SYRINGE (ML) INJECTION AS NEEDED
Status: DISCONTINUED | OUTPATIENT
Start: 2020-12-16 | End: 2020-12-22 | Stop reason: HOSPADM

## 2020-12-16 RX ORDER — ONDANSETRON 2 MG/ML
4 INJECTION INTRAMUSCULAR; INTRAVENOUS ONCE
Status: COMPLETED | OUTPATIENT
Start: 2020-12-16 | End: 2020-12-16

## 2020-12-16 RX ORDER — CYCLOBENZAPRINE HCL 10 MG
10 TABLET ORAL 2 TIMES DAILY PRN
COMMUNITY
Start: 2020-12-15 | End: 2020-12-22 | Stop reason: HOSPADM

## 2020-12-16 RX ADMIN — CEFTRIAXONE SODIUM 1 G: 1 INJECTION, SOLUTION INTRAVENOUS at 17:49

## 2020-12-16 RX ADMIN — HYDROMORPHONE HYDROCHLORIDE 0.5 MG: 1 INJECTION, SOLUTION INTRAMUSCULAR; INTRAVENOUS; SUBCUTANEOUS at 17:49

## 2020-12-16 RX ADMIN — SODIUM CHLORIDE, POTASSIUM CHLORIDE, SODIUM LACTATE AND CALCIUM CHLORIDE 1000 ML: 600; 310; 30; 20 INJECTION, SOLUTION INTRAVENOUS at 17:18

## 2020-12-16 RX ADMIN — ONDANSETRON 4 MG: 2 INJECTION INTRAMUSCULAR; INTRAVENOUS at 17:49

## 2020-12-16 RX ADMIN — INSULIN HUMAN 10 UNITS: 100 INJECTION, SOLUTION PARENTERAL at 17:47

## 2020-12-16 RX ADMIN — ALPRAZOLAM 0.25 MG: 0.25 TABLET ORAL at 23:20

## 2020-12-16 RX ADMIN — SODIUM CHLORIDE 75 ML/HR: 9 INJECTION, SOLUTION INTRAVENOUS at 23:20

## 2020-12-17 LAB
GLUCOSE BLDC GLUCOMTR-MCNC: 157 MG/DL (ref 70–130)
GLUCOSE BLDC GLUCOMTR-MCNC: 213 MG/DL (ref 70–130)
GLUCOSE BLDC GLUCOMTR-MCNC: 223 MG/DL (ref 70–130)
GLUCOSE BLDC GLUCOMTR-MCNC: 291 MG/DL (ref 70–130)

## 2020-12-17 PROCEDURE — 25010000002 ONDANSETRON PER 1 MG: Performed by: HOSPITALIST

## 2020-12-17 PROCEDURE — 25010000002 ENOXAPARIN PER 10 MG: Performed by: HOSPITALIST

## 2020-12-17 PROCEDURE — 97110 THERAPEUTIC EXERCISES: CPT

## 2020-12-17 PROCEDURE — 97162 PT EVAL MOD COMPLEX 30 MIN: CPT

## 2020-12-17 PROCEDURE — 63710000001 INSULIN LISPRO (HUMAN) PER 5 UNITS: Performed by: HOSPITALIST

## 2020-12-17 PROCEDURE — 25010000003 CEFTRIAXONE PER 250 MG: Performed by: HOSPITALIST

## 2020-12-17 PROCEDURE — 63710000001 INSULIN GLARGINE PER 5 UNITS: Performed by: HOSPITALIST

## 2020-12-17 PROCEDURE — 82962 GLUCOSE BLOOD TEST: CPT

## 2020-12-17 RX ORDER — CARVEDILOL 12.5 MG/1
12.5 TABLET ORAL 2 TIMES DAILY WITH MEALS
Status: DISCONTINUED | OUTPATIENT
Start: 2020-12-17 | End: 2020-12-21

## 2020-12-17 RX ORDER — POLYETHYLENE GLYCOL 3350 17 G/17G
17 POWDER, FOR SOLUTION ORAL NIGHTLY
Status: DISCONTINUED | OUTPATIENT
Start: 2020-12-17 | End: 2020-12-22 | Stop reason: HOSPADM

## 2020-12-17 RX ORDER — HYDROCODONE BITARTRATE AND ACETAMINOPHEN 5; 325 MG/1; MG/1
1 TABLET ORAL EVERY 6 HOURS PRN
Status: DISCONTINUED | OUTPATIENT
Start: 2020-12-17 | End: 2020-12-22 | Stop reason: HOSPADM

## 2020-12-17 RX ORDER — MELATONIN
1000 DAILY
Status: DISCONTINUED | OUTPATIENT
Start: 2020-12-17 | End: 2020-12-22 | Stop reason: HOSPADM

## 2020-12-17 RX ORDER — FAMOTIDINE 20 MG/1
20 TABLET, FILM COATED ORAL 2 TIMES DAILY
Status: DISCONTINUED | OUTPATIENT
Start: 2020-12-17 | End: 2020-12-18

## 2020-12-17 RX ORDER — CEFTRIAXONE SODIUM 1 G/50ML
1 INJECTION, SOLUTION INTRAVENOUS ONCE
Status: DISCONTINUED | OUTPATIENT
Start: 2020-12-17 | End: 2020-12-17

## 2020-12-17 RX ORDER — HYDROMORPHONE HYDROCHLORIDE 1 MG/ML
0.5 INJECTION, SOLUTION INTRAMUSCULAR; INTRAVENOUS; SUBCUTANEOUS ONCE
Status: DISCONTINUED | OUTPATIENT
Start: 2020-12-17 | End: 2020-12-17

## 2020-12-17 RX ORDER — ONDANSETRON 2 MG/ML
4 INJECTION INTRAMUSCULAR; INTRAVENOUS EVERY 6 HOURS PRN
Status: DISCONTINUED | OUTPATIENT
Start: 2020-12-17 | End: 2020-12-22 | Stop reason: HOSPADM

## 2020-12-17 RX ORDER — INSULIN GLARGINE 100 [IU]/ML
20 INJECTION, SOLUTION SUBCUTANEOUS NIGHTLY
Status: DISCONTINUED | OUTPATIENT
Start: 2020-12-17 | End: 2020-12-17

## 2020-12-17 RX ORDER — HYDRALAZINE HYDROCHLORIDE 25 MG/1
25 TABLET, FILM COATED ORAL EVERY 8 HOURS SCHEDULED
Status: DISCONTINUED | OUTPATIENT
Start: 2020-12-17 | End: 2020-12-21

## 2020-12-17 RX ORDER — INSULIN GLARGINE 100 [IU]/ML
15 INJECTION, SOLUTION SUBCUTANEOUS NIGHTLY
Status: DISCONTINUED | OUTPATIENT
Start: 2020-12-17 | End: 2020-12-18

## 2020-12-17 RX ORDER — CEFTRIAXONE SODIUM 2 G/50ML
2 INJECTION, SOLUTION INTRAVENOUS EVERY 24 HOURS
Status: DISCONTINUED | OUTPATIENT
Start: 2020-12-17 | End: 2020-12-21

## 2020-12-17 RX ORDER — UREA 10 %
3 LOTION (ML) TOPICAL NIGHTLY PRN
Status: DISCONTINUED | OUTPATIENT
Start: 2020-12-17 | End: 2020-12-22 | Stop reason: HOSPADM

## 2020-12-17 RX ORDER — ASPIRIN 81 MG/1
81 TABLET ORAL DAILY
Status: DISCONTINUED | OUTPATIENT
Start: 2020-12-17 | End: 2020-12-22 | Stop reason: HOSPADM

## 2020-12-17 RX ORDER — AMOXICILLIN 250 MG
2 CAPSULE ORAL 2 TIMES DAILY
Status: DISCONTINUED | OUTPATIENT
Start: 2020-12-17 | End: 2020-12-22 | Stop reason: HOSPADM

## 2020-12-17 RX ORDER — TAMSULOSIN HYDROCHLORIDE 0.4 MG/1
0.8 CAPSULE ORAL DAILY
Status: DISCONTINUED | OUTPATIENT
Start: 2020-12-17 | End: 2020-12-22 | Stop reason: HOSPADM

## 2020-12-17 RX ORDER — UREA 10 %
3 LOTION (ML) TOPICAL NIGHTLY
Status: DISCONTINUED | OUTPATIENT
Start: 2020-12-17 | End: 2020-12-17

## 2020-12-17 RX ADMIN — INSULIN LISPRO 3 UNITS: 100 INJECTION, SOLUTION INTRAVENOUS; SUBCUTANEOUS at 13:47

## 2020-12-17 RX ADMIN — ONDANSETRON 4 MG: 2 INJECTION INTRAMUSCULAR; INTRAVENOUS at 23:13

## 2020-12-17 RX ADMIN — ACETAMINOPHEN 650 MG: 325 TABLET, FILM COATED ORAL at 00:50

## 2020-12-17 RX ADMIN — HYDROCODONE BITARTRATE AND ACETAMINOPHEN 1 TABLET: 5; 325 TABLET ORAL at 14:14

## 2020-12-17 RX ADMIN — INSULIN LISPRO 3 UNITS: 100 INJECTION, SOLUTION INTRAVENOUS; SUBCUTANEOUS at 17:18

## 2020-12-17 RX ADMIN — TAMSULOSIN HYDROCHLORIDE 0.8 MG: 0.4 CAPSULE ORAL at 14:14

## 2020-12-17 RX ADMIN — ASPIRIN 81 MG: 81 TABLET, COATED ORAL at 14:15

## 2020-12-17 RX ADMIN — ONDANSETRON 4 MG: 2 INJECTION INTRAMUSCULAR; INTRAVENOUS at 14:14

## 2020-12-17 RX ADMIN — CARVEDILOL 12.5 MG: 12.5 TABLET, FILM COATED ORAL at 17:15

## 2020-12-17 RX ADMIN — HYDRALAZINE HYDROCHLORIDE 25 MG: 25 TABLET, FILM COATED ORAL at 14:15

## 2020-12-17 RX ADMIN — FAMOTIDINE 20 MG: 20 TABLET, FILM COATED ORAL at 20:37

## 2020-12-17 RX ADMIN — HYDRALAZINE HYDROCHLORIDE 25 MG: 25 TABLET, FILM COATED ORAL at 21:02

## 2020-12-17 RX ADMIN — Medication 1000 UNITS: at 14:15

## 2020-12-17 RX ADMIN — CEFTRIAXONE SODIUM 2 G: 2 INJECTION, SOLUTION INTRAVENOUS at 17:15

## 2020-12-17 RX ADMIN — ENOXAPARIN SODIUM 30 MG: 40 INJECTION SUBCUTANEOUS at 14:15

## 2020-12-17 RX ADMIN — INSULIN GLARGINE 15 UNITS: 100 INJECTION, SOLUTION SUBCUTANEOUS at 20:37

## 2020-12-17 RX ADMIN — INSULIN LISPRO 4 UNITS: 100 INJECTION, SOLUTION INTRAVENOUS; SUBCUTANEOUS at 07:55

## 2020-12-17 RX ADMIN — INSULIN LISPRO 5 UNITS: 100 INJECTION, SOLUTION INTRAVENOUS; SUBCUTANEOUS at 17:18

## 2020-12-17 RX ADMIN — HYDROCODONE BITARTRATE AND ACETAMINOPHEN 1 TABLET: 5; 325 TABLET ORAL at 21:02

## 2020-12-18 LAB
ALBUMIN SERPL-MCNC: 2.7 G/DL (ref 3.5–5.2)
ALBUMIN/GLOB SERPL: 0.8 G/DL
ALP SERPL-CCNC: 116 U/L (ref 39–117)
ALT SERPL W P-5'-P-CCNC: 42 U/L (ref 1–41)
ANION GAP SERPL CALCULATED.3IONS-SCNC: 10.3 MMOL/L (ref 5–15)
AST SERPL-CCNC: 21 U/L (ref 1–40)
BACTERIA SPEC AEROBE CULT: ABNORMAL
BASOPHILS # BLD AUTO: 0.02 10*3/MM3 (ref 0–0.2)
BASOPHILS NFR BLD AUTO: 0.2 % (ref 0–1.5)
BILIRUB SERPL-MCNC: 1.5 MG/DL (ref 0–1.2)
BUN SERPL-MCNC: 50 MG/DL (ref 8–23)
BUN/CREAT SERPL: 23.3 (ref 7–25)
CALCIUM SPEC-SCNC: 8.4 MG/DL (ref 8.6–10.5)
CHLORIDE SERPL-SCNC: 97 MMOL/L (ref 98–107)
CHOLEST SERPL-MCNC: 80 MG/DL (ref 0–200)
CO2 SERPL-SCNC: 25.7 MMOL/L (ref 22–29)
CREAT SERPL-MCNC: 2.15 MG/DL (ref 0.76–1.27)
DEPRECATED RDW RBC AUTO: 43.6 FL (ref 37–54)
EOSINOPHIL # BLD AUTO: 0.06 10*3/MM3 (ref 0–0.4)
EOSINOPHIL NFR BLD AUTO: 0.5 % (ref 0.3–6.2)
ERYTHROCYTE [DISTWIDTH] IN BLOOD BY AUTOMATED COUNT: 13.3 % (ref 12.3–15.4)
GFR SERPL CREATININE-BSD FRML MDRD: 30 ML/MIN/1.73
GLOBULIN UR ELPH-MCNC: 3.5 GM/DL
GLUCOSE BLDC GLUCOMTR-MCNC: 117 MG/DL (ref 70–130)
GLUCOSE BLDC GLUCOMTR-MCNC: 142 MG/DL (ref 70–130)
GLUCOSE BLDC GLUCOMTR-MCNC: 180 MG/DL (ref 70–130)
GLUCOSE BLDC GLUCOMTR-MCNC: 180 MG/DL (ref 70–130)
GLUCOSE SERPL-MCNC: 162 MG/DL (ref 65–99)
GRAM STN SPEC: ABNORMAL
HBA1C MFR BLD: 8.3 % (ref 4.8–5.6)
HCT VFR BLD AUTO: 28.4 % (ref 37.5–51)
HDLC SERPL-MCNC: 18 MG/DL (ref 40–60)
HGB BLD-MCNC: 9.4 G/DL (ref 13–17.7)
IMM GRANULOCYTES # BLD AUTO: 0.1 10*3/MM3 (ref 0–0.05)
IMM GRANULOCYTES NFR BLD AUTO: 0.8 % (ref 0–0.5)
ISOLATED FROM: ABNORMAL
LDLC SERPL CALC-MCNC: 36 MG/DL (ref 0–100)
LDLC/HDLC SERPL: 1.79 {RATIO}
LYMPHOCYTES # BLD AUTO: 0.65 10*3/MM3 (ref 0.7–3.1)
LYMPHOCYTES NFR BLD AUTO: 5.4 % (ref 19.6–45.3)
MCH RBC QN AUTO: 30.2 PG (ref 26.6–33)
MCHC RBC AUTO-ENTMCNC: 33.1 G/DL (ref 31.5–35.7)
MCV RBC AUTO: 91.3 FL (ref 79–97)
MONOCYTES # BLD AUTO: 0.91 10*3/MM3 (ref 0.1–0.9)
MONOCYTES NFR BLD AUTO: 7.5 % (ref 5–12)
NEUTROPHILS NFR BLD AUTO: 10.37 10*3/MM3 (ref 1.7–7)
NEUTROPHILS NFR BLD AUTO: 85.6 % (ref 42.7–76)
NRBC BLD AUTO-RTO: 0.1 /100 WBC (ref 0–0.2)
NT-PROBNP SERPL-MCNC: 6796 PG/ML (ref 0–900)
PLATELET # BLD AUTO: 292 10*3/MM3 (ref 140–450)
PMV BLD AUTO: 10.4 FL (ref 6–12)
POTASSIUM SERPL-SCNC: 4.3 MMOL/L (ref 3.5–5.2)
PROT SERPL-MCNC: 6.2 G/DL (ref 6–8.5)
RBC # BLD AUTO: 3.11 10*6/MM3 (ref 4.14–5.8)
SODIUM SERPL-SCNC: 133 MMOL/L (ref 136–145)
TRIGL SERPL-MCNC: 149 MG/DL (ref 0–150)
TSH SERPL DL<=0.05 MIU/L-ACNC: 1.77 UIU/ML (ref 0.27–4.2)
VLDLC SERPL-MCNC: 26 MG/DL (ref 5–40)
WBC # BLD AUTO: 12.11 10*3/MM3 (ref 3.4–10.8)

## 2020-12-18 PROCEDURE — 85025 COMPLETE CBC W/AUTO DIFF WBC: CPT | Performed by: HOSPITALIST

## 2020-12-18 PROCEDURE — 25010000002 ONDANSETRON PER 1 MG: Performed by: HOSPITALIST

## 2020-12-18 PROCEDURE — 97530 THERAPEUTIC ACTIVITIES: CPT

## 2020-12-18 PROCEDURE — 97110 THERAPEUTIC EXERCISES: CPT

## 2020-12-18 PROCEDURE — 83036 HEMOGLOBIN GLYCOSYLATED A1C: CPT | Performed by: HOSPITALIST

## 2020-12-18 PROCEDURE — 84443 ASSAY THYROID STIM HORMONE: CPT | Performed by: HOSPITALIST

## 2020-12-18 PROCEDURE — 80053 COMPREHEN METABOLIC PANEL: CPT | Performed by: HOSPITALIST

## 2020-12-18 PROCEDURE — 87040 BLOOD CULTURE FOR BACTERIA: CPT | Performed by: HOSPITALIST

## 2020-12-18 PROCEDURE — 63710000001 INSULIN LISPRO (HUMAN) PER 5 UNITS: Performed by: HOSPITALIST

## 2020-12-18 PROCEDURE — 83880 ASSAY OF NATRIURETIC PEPTIDE: CPT | Performed by: HOSPITALIST

## 2020-12-18 PROCEDURE — 97166 OT EVAL MOD COMPLEX 45 MIN: CPT

## 2020-12-18 PROCEDURE — 80061 LIPID PANEL: CPT | Performed by: HOSPITALIST

## 2020-12-18 PROCEDURE — 82962 GLUCOSE BLOOD TEST: CPT

## 2020-12-18 PROCEDURE — 63710000001 INSULIN GLARGINE PER 5 UNITS: Performed by: HOSPITALIST

## 2020-12-18 PROCEDURE — 25010000003 CEFTRIAXONE PER 250 MG: Performed by: HOSPITALIST

## 2020-12-18 PROCEDURE — 97535 SELF CARE MNGMENT TRAINING: CPT

## 2020-12-18 RX ORDER — INSULIN GLARGINE 100 [IU]/ML
20 INJECTION, SOLUTION SUBCUTANEOUS NIGHTLY
Status: DISCONTINUED | OUTPATIENT
Start: 2020-12-18 | End: 2020-12-22 | Stop reason: HOSPADM

## 2020-12-18 RX ORDER — FAMOTIDINE 20 MG/1
20 TABLET, FILM COATED ORAL DAILY
Status: DISCONTINUED | OUTPATIENT
Start: 2020-12-19 | End: 2020-12-22 | Stop reason: HOSPADM

## 2020-12-18 RX ADMIN — HYDROCODONE BITARTRATE AND ACETAMINOPHEN 1 TABLET: 5; 325 TABLET ORAL at 10:49

## 2020-12-18 RX ADMIN — CARVEDILOL 12.5 MG: 12.5 TABLET, FILM COATED ORAL at 18:12

## 2020-12-18 RX ADMIN — HYDROCODONE BITARTRATE AND ACETAMINOPHEN 1 TABLET: 5; 325 TABLET ORAL at 04:27

## 2020-12-18 RX ADMIN — DOCUSATE SODIUM 50MG AND SENNOSIDES 8.6MG 2 TABLET: 8.6; 5 TABLET, FILM COATED ORAL at 08:22

## 2020-12-18 RX ADMIN — ONDANSETRON 4 MG: 2 INJECTION INTRAMUSCULAR; INTRAVENOUS at 08:22

## 2020-12-18 RX ADMIN — INSULIN LISPRO 2 UNITS: 100 INJECTION, SOLUTION INTRAVENOUS; SUBCUTANEOUS at 12:49

## 2020-12-18 RX ADMIN — DOCUSATE SODIUM 50MG AND SENNOSIDES 8.6MG 2 TABLET: 8.6; 5 TABLET, FILM COATED ORAL at 21:04

## 2020-12-18 RX ADMIN — HYDRALAZINE HYDROCHLORIDE 25 MG: 25 TABLET, FILM COATED ORAL at 15:17

## 2020-12-18 RX ADMIN — INSULIN LISPRO 5 UNITS: 100 INJECTION, SOLUTION INTRAVENOUS; SUBCUTANEOUS at 08:22

## 2020-12-18 RX ADMIN — CARVEDILOL 12.5 MG: 12.5 TABLET, FILM COATED ORAL at 08:22

## 2020-12-18 RX ADMIN — HYDRALAZINE HYDROCHLORIDE 25 MG: 25 TABLET, FILM COATED ORAL at 21:03

## 2020-12-18 RX ADMIN — Medication 3 MG: at 21:04

## 2020-12-18 RX ADMIN — TAMSULOSIN HYDROCHLORIDE 0.8 MG: 0.4 CAPSULE ORAL at 08:22

## 2020-12-18 RX ADMIN — SODIUM CHLORIDE, PRESERVATIVE FREE 10 ML: 5 INJECTION INTRAVENOUS at 21:04

## 2020-12-18 RX ADMIN — CEFTRIAXONE SODIUM 2 G: 2 INJECTION, SOLUTION INTRAVENOUS at 18:12

## 2020-12-18 RX ADMIN — ASPIRIN 81 MG: 81 TABLET, COATED ORAL at 08:22

## 2020-12-18 RX ADMIN — INSULIN LISPRO 5 UNITS: 100 INJECTION, SOLUTION INTRAVENOUS; SUBCUTANEOUS at 12:50

## 2020-12-18 RX ADMIN — ALPRAZOLAM 0.25 MG: 0.25 TABLET ORAL at 15:53

## 2020-12-18 RX ADMIN — Medication 1000 UNITS: at 08:22

## 2020-12-18 RX ADMIN — INSULIN GLARGINE 20 UNITS: 100 INJECTION, SOLUTION SUBCUTANEOUS at 21:05

## 2020-12-18 RX ADMIN — FAMOTIDINE 20 MG: 20 TABLET, FILM COATED ORAL at 08:22

## 2020-12-18 RX ADMIN — HYDRALAZINE HYDROCHLORIDE 25 MG: 25 TABLET, FILM COATED ORAL at 06:01

## 2020-12-18 RX ADMIN — ALPRAZOLAM 0.25 MG: 0.25 TABLET ORAL at 21:04

## 2020-12-18 RX ADMIN — POLYETHYLENE GLYCOL 3350 17 G: 17 POWDER, FOR SOLUTION ORAL at 21:04

## 2020-12-18 RX ADMIN — HYDROCODONE BITARTRATE AND ACETAMINOPHEN 1 TABLET: 5; 325 TABLET ORAL at 18:12

## 2020-12-19 LAB
ANION GAP SERPL CALCULATED.3IONS-SCNC: 9.7 MMOL/L (ref 5–15)
BASOPHILS # BLD AUTO: 0.02 10*3/MM3 (ref 0–0.2)
BASOPHILS NFR BLD AUTO: 0.2 % (ref 0–1.5)
BUN SERPL-MCNC: 48 MG/DL (ref 8–23)
BUN/CREAT SERPL: 30.4 (ref 7–25)
CALCIUM SPEC-SCNC: 8.5 MG/DL (ref 8.6–10.5)
CHLORIDE SERPL-SCNC: 100 MMOL/L (ref 98–107)
CO2 SERPL-SCNC: 25.3 MMOL/L (ref 22–29)
CREAT SERPL-MCNC: 1.58 MG/DL (ref 0.76–1.27)
DEPRECATED RDW RBC AUTO: 44 FL (ref 37–54)
EOSINOPHIL # BLD AUTO: 0.15 10*3/MM3 (ref 0–0.4)
EOSINOPHIL NFR BLD AUTO: 1.5 % (ref 0.3–6.2)
ERYTHROCYTE [DISTWIDTH] IN BLOOD BY AUTOMATED COUNT: 13.4 % (ref 12.3–15.4)
FERRITIN SERPL-MCNC: 316 NG/ML (ref 30–400)
GFR SERPL CREATININE-BSD FRML MDRD: 43 ML/MIN/1.73
GLUCOSE BLDC GLUCOMTR-MCNC: 137 MG/DL (ref 70–130)
GLUCOSE BLDC GLUCOMTR-MCNC: 147 MG/DL (ref 70–130)
GLUCOSE BLDC GLUCOMTR-MCNC: 153 MG/DL (ref 70–130)
GLUCOSE BLDC GLUCOMTR-MCNC: 79 MG/DL (ref 70–130)
GLUCOSE SERPL-MCNC: 152 MG/DL (ref 65–99)
HCT VFR BLD AUTO: 27.7 % (ref 37.5–51)
HGB BLD-MCNC: 9.5 G/DL (ref 13–17.7)
IMM GRANULOCYTES # BLD AUTO: 0.09 10*3/MM3 (ref 0–0.05)
IMM GRANULOCYTES NFR BLD AUTO: 0.9 % (ref 0–0.5)
IRON 24H UR-MRATE: 18 MCG/DL (ref 59–158)
IRON SATN MFR SERPL: 9 % (ref 20–50)
LYMPHOCYTES # BLD AUTO: 0.62 10*3/MM3 (ref 0.7–3.1)
LYMPHOCYTES NFR BLD AUTO: 6.4 % (ref 19.6–45.3)
MCH RBC QN AUTO: 31.3 PG (ref 26.6–33)
MCHC RBC AUTO-ENTMCNC: 34.3 G/DL (ref 31.5–35.7)
MCV RBC AUTO: 91.1 FL (ref 79–97)
MONOCYTES # BLD AUTO: 1.03 10*3/MM3 (ref 0.1–0.9)
MONOCYTES NFR BLD AUTO: 10.6 % (ref 5–12)
NEUTROPHILS NFR BLD AUTO: 7.79 10*3/MM3 (ref 1.7–7)
NEUTROPHILS NFR BLD AUTO: 80.4 % (ref 42.7–76)
NRBC BLD AUTO-RTO: 0 /100 WBC (ref 0–0.2)
PLATELET # BLD AUTO: 271 10*3/MM3 (ref 140–450)
PMV BLD AUTO: 10.5 FL (ref 6–12)
POTASSIUM SERPL-SCNC: 4.2 MMOL/L (ref 3.5–5.2)
RBC # BLD AUTO: 3.04 10*6/MM3 (ref 4.14–5.8)
SODIUM SERPL-SCNC: 135 MMOL/L (ref 136–145)
TIBC SERPL-MCNC: 207 MCG/DL (ref 298–536)
TRANSFERRIN SERPL-MCNC: 139 MG/DL (ref 200–360)
WBC # BLD AUTO: 9.7 10*3/MM3 (ref 3.4–10.8)

## 2020-12-19 PROCEDURE — 80048 BASIC METABOLIC PNL TOTAL CA: CPT | Performed by: HOSPITALIST

## 2020-12-19 PROCEDURE — 97530 THERAPEUTIC ACTIVITIES: CPT

## 2020-12-19 PROCEDURE — 25010000003 CEFTRIAXONE PER 250 MG: Performed by: HOSPITALIST

## 2020-12-19 PROCEDURE — 84466 ASSAY OF TRANSFERRIN: CPT | Performed by: INTERNAL MEDICINE

## 2020-12-19 PROCEDURE — 97110 THERAPEUTIC EXERCISES: CPT

## 2020-12-19 PROCEDURE — 85025 COMPLETE CBC W/AUTO DIFF WBC: CPT | Performed by: HOSPITALIST

## 2020-12-19 PROCEDURE — 63710000001 INSULIN GLARGINE PER 5 UNITS: Performed by: HOSPITALIST

## 2020-12-19 PROCEDURE — 63710000001 INSULIN LISPRO (HUMAN) PER 5 UNITS: Performed by: HOSPITALIST

## 2020-12-19 PROCEDURE — 25010000002 FUROSEMIDE PER 20 MG: Performed by: INTERNAL MEDICINE

## 2020-12-19 PROCEDURE — 25010000002 ONDANSETRON PER 1 MG: Performed by: HOSPITALIST

## 2020-12-19 PROCEDURE — 82728 ASSAY OF FERRITIN: CPT | Performed by: INTERNAL MEDICINE

## 2020-12-19 PROCEDURE — 82962 GLUCOSE BLOOD TEST: CPT

## 2020-12-19 PROCEDURE — 83540 ASSAY OF IRON: CPT | Performed by: INTERNAL MEDICINE

## 2020-12-19 PROCEDURE — 25010000002 ENOXAPARIN PER 10 MG: Performed by: HOSPITALIST

## 2020-12-19 RX ORDER — FUROSEMIDE 10 MG/ML
40 INJECTION INTRAMUSCULAR; INTRAVENOUS ONCE
Status: COMPLETED | OUTPATIENT
Start: 2020-12-19 | End: 2020-12-19

## 2020-12-19 RX ADMIN — FUROSEMIDE 40 MG: 10 INJECTION, SOLUTION INTRAMUSCULAR; INTRAVENOUS at 12:21

## 2020-12-19 RX ADMIN — INSULIN LISPRO 7 UNITS: 100 INJECTION, SOLUTION INTRAVENOUS; SUBCUTANEOUS at 08:24

## 2020-12-19 RX ADMIN — CEFTRIAXONE SODIUM 2 G: 2 INJECTION, SOLUTION INTRAVENOUS at 18:25

## 2020-12-19 RX ADMIN — ENOXAPARIN SODIUM 40 MG: 40 INJECTION SUBCUTANEOUS at 14:06

## 2020-12-19 RX ADMIN — ALPRAZOLAM 0.25 MG: 0.25 TABLET ORAL at 22:00

## 2020-12-19 RX ADMIN — CARVEDILOL 12.5 MG: 12.5 TABLET, FILM COATED ORAL at 08:24

## 2020-12-19 RX ADMIN — DOCUSATE SODIUM 50MG AND SENNOSIDES 8.6MG 2 TABLET: 8.6; 5 TABLET, FILM COATED ORAL at 20:25

## 2020-12-19 RX ADMIN — INSULIN GLARGINE 20 UNITS: 100 INJECTION, SOLUTION SUBCUTANEOUS at 21:58

## 2020-12-19 RX ADMIN — FAMOTIDINE 20 MG: 20 TABLET, FILM COATED ORAL at 08:23

## 2020-12-19 RX ADMIN — CARVEDILOL 12.5 MG: 12.5 TABLET, FILM COATED ORAL at 18:25

## 2020-12-19 RX ADMIN — POLYETHYLENE GLYCOL 3350 17 G: 17 POWDER, FOR SOLUTION ORAL at 20:32

## 2020-12-19 RX ADMIN — ONDANSETRON 4 MG: 2 INJECTION INTRAMUSCULAR; INTRAVENOUS at 08:24

## 2020-12-19 RX ADMIN — HYDROCODONE BITARTRATE AND ACETAMINOPHEN 1 TABLET: 5; 325 TABLET ORAL at 20:25

## 2020-12-19 RX ADMIN — HYDROCODONE BITARTRATE AND ACETAMINOPHEN 1 TABLET: 5; 325 TABLET ORAL at 08:24

## 2020-12-19 RX ADMIN — INSULIN LISPRO 2 UNITS: 100 INJECTION, SOLUTION INTRAVENOUS; SUBCUTANEOUS at 12:22

## 2020-12-19 RX ADMIN — Medication 1000 UNITS: at 08:23

## 2020-12-19 RX ADMIN — HYDRALAZINE HYDROCHLORIDE 25 MG: 25 TABLET, FILM COATED ORAL at 14:06

## 2020-12-19 RX ADMIN — TAMSULOSIN HYDROCHLORIDE 0.8 MG: 0.4 CAPSULE ORAL at 08:23

## 2020-12-19 RX ADMIN — SODIUM CHLORIDE, PRESERVATIVE FREE 10 ML: 5 INJECTION INTRAVENOUS at 20:25

## 2020-12-19 RX ADMIN — INSULIN LISPRO 7 UNITS: 100 INJECTION, SOLUTION INTRAVENOUS; SUBCUTANEOUS at 12:22

## 2020-12-19 RX ADMIN — HYDROCODONE BITARTRATE AND ACETAMINOPHEN 1 TABLET: 5; 325 TABLET ORAL at 14:06

## 2020-12-19 RX ADMIN — Medication 3 MG: at 20:25

## 2020-12-19 RX ADMIN — ASPIRIN 81 MG: 81 TABLET, COATED ORAL at 08:24

## 2020-12-19 RX ADMIN — HYDRALAZINE HYDROCHLORIDE 25 MG: 25 TABLET, FILM COATED ORAL at 06:21

## 2020-12-20 LAB
ANION GAP SERPL CALCULATED.3IONS-SCNC: 10.5 MMOL/L (ref 5–15)
BASOPHILS # BLD AUTO: 0.04 10*3/MM3 (ref 0–0.2)
BASOPHILS NFR BLD AUTO: 0.5 % (ref 0–1.5)
BUN SERPL-MCNC: 45 MG/DL (ref 8–23)
BUN/CREAT SERPL: 31.9 (ref 7–25)
CALCIUM SPEC-SCNC: 8.5 MG/DL (ref 8.6–10.5)
CHLORIDE SERPL-SCNC: 98 MMOL/L (ref 98–107)
CO2 SERPL-SCNC: 26.5 MMOL/L (ref 22–29)
CREAT SERPL-MCNC: 1.41 MG/DL (ref 0.76–1.27)
DEPRECATED RDW RBC AUTO: 43.8 FL (ref 37–54)
EOSINOPHIL # BLD AUTO: 0.15 10*3/MM3 (ref 0–0.4)
EOSINOPHIL NFR BLD AUTO: 1.9 % (ref 0.3–6.2)
ERYTHROCYTE [DISTWIDTH] IN BLOOD BY AUTOMATED COUNT: 13.3 % (ref 12.3–15.4)
GFR SERPL CREATININE-BSD FRML MDRD: 50 ML/MIN/1.73
GLUCOSE BLDC GLUCOMTR-MCNC: 105 MG/DL (ref 70–130)
GLUCOSE BLDC GLUCOMTR-MCNC: 121 MG/DL (ref 70–130)
GLUCOSE BLDC GLUCOMTR-MCNC: 136 MG/DL (ref 70–130)
GLUCOSE BLDC GLUCOMTR-MCNC: 208 MG/DL (ref 70–130)
GLUCOSE BLDC GLUCOMTR-MCNC: 97 MG/DL (ref 70–130)
GLUCOSE SERPL-MCNC: 146 MG/DL (ref 65–99)
HCT VFR BLD AUTO: 28.6 % (ref 37.5–51)
HGB BLD-MCNC: 9.4 G/DL (ref 13–17.7)
IMM GRANULOCYTES # BLD AUTO: 0.07 10*3/MM3 (ref 0–0.05)
IMM GRANULOCYTES NFR BLD AUTO: 0.9 % (ref 0–0.5)
LYMPHOCYTES # BLD AUTO: 0.9 10*3/MM3 (ref 0.7–3.1)
LYMPHOCYTES NFR BLD AUTO: 11.6 % (ref 19.6–45.3)
MCH RBC QN AUTO: 29.7 PG (ref 26.6–33)
MCHC RBC AUTO-ENTMCNC: 32.9 G/DL (ref 31.5–35.7)
MCV RBC AUTO: 90.5 FL (ref 79–97)
MONOCYTES # BLD AUTO: 0.71 10*3/MM3 (ref 0.1–0.9)
MONOCYTES NFR BLD AUTO: 9.2 % (ref 5–12)
NEUTROPHILS NFR BLD AUTO: 5.88 10*3/MM3 (ref 1.7–7)
NEUTROPHILS NFR BLD AUTO: 75.9 % (ref 42.7–76)
NRBC BLD AUTO-RTO: 0 /100 WBC (ref 0–0.2)
PLATELET # BLD AUTO: 293 10*3/MM3 (ref 140–450)
PMV BLD AUTO: 10.4 FL (ref 6–12)
POTASSIUM SERPL-SCNC: 3.9 MMOL/L (ref 3.5–5.2)
RBC # BLD AUTO: 3.16 10*6/MM3 (ref 4.14–5.8)
SODIUM SERPL-SCNC: 135 MMOL/L (ref 136–145)
WBC # BLD AUTO: 7.75 10*3/MM3 (ref 3.4–10.8)

## 2020-12-20 PROCEDURE — 85025 COMPLETE CBC W/AUTO DIFF WBC: CPT | Performed by: HOSPITALIST

## 2020-12-20 PROCEDURE — 63710000001 INSULIN LISPRO (HUMAN) PER 5 UNITS: Performed by: HOSPITALIST

## 2020-12-20 PROCEDURE — 80048 BASIC METABOLIC PNL TOTAL CA: CPT | Performed by: HOSPITALIST

## 2020-12-20 PROCEDURE — 82962 GLUCOSE BLOOD TEST: CPT

## 2020-12-20 PROCEDURE — 25010000003 CEFTRIAXONE PER 250 MG: Performed by: HOSPITALIST

## 2020-12-20 PROCEDURE — 25010000002 ENOXAPARIN PER 10 MG: Performed by: HOSPITALIST

## 2020-12-20 PROCEDURE — 25010000002 NA FERRIC GLUC CPLX PER 12.5 MG: Performed by: INTERNAL MEDICINE

## 2020-12-20 PROCEDURE — 63710000001 INSULIN GLARGINE PER 5 UNITS: Performed by: HOSPITALIST

## 2020-12-20 RX ADMIN — Medication 3 MG: at 21:08

## 2020-12-20 RX ADMIN — HYDRALAZINE HYDROCHLORIDE 25 MG: 25 TABLET, FILM COATED ORAL at 05:33

## 2020-12-20 RX ADMIN — HYDROCODONE BITARTRATE AND ACETAMINOPHEN 1 TABLET: 5; 325 TABLET ORAL at 17:33

## 2020-12-20 RX ADMIN — HYDRALAZINE HYDROCHLORIDE 25 MG: 25 TABLET, FILM COATED ORAL at 13:47

## 2020-12-20 RX ADMIN — DOCUSATE SODIUM 50MG AND SENNOSIDES 8.6MG 2 TABLET: 8.6; 5 TABLET, FILM COATED ORAL at 20:25

## 2020-12-20 RX ADMIN — TAMSULOSIN HYDROCHLORIDE 0.8 MG: 0.4 CAPSULE ORAL at 08:19

## 2020-12-20 RX ADMIN — HYDRALAZINE HYDROCHLORIDE 25 MG: 25 TABLET, FILM COATED ORAL at 21:08

## 2020-12-20 RX ADMIN — INSULIN LISPRO 8 UNITS: 100 INJECTION, SOLUTION INTRAVENOUS; SUBCUTANEOUS at 08:18

## 2020-12-20 RX ADMIN — ASPIRIN 81 MG: 81 TABLET, COATED ORAL at 08:18

## 2020-12-20 RX ADMIN — SODIUM CHLORIDE 125 MG: 9 INJECTION, SOLUTION INTRAVENOUS at 10:57

## 2020-12-20 RX ADMIN — CEFTRIAXONE SODIUM 2 G: 2 INJECTION, SOLUTION INTRAVENOUS at 17:06

## 2020-12-20 RX ADMIN — ENOXAPARIN SODIUM 40 MG: 40 INJECTION SUBCUTANEOUS at 13:47

## 2020-12-20 RX ADMIN — ALPRAZOLAM 0.25 MG: 0.25 TABLET ORAL at 12:03

## 2020-12-20 RX ADMIN — INSULIN LISPRO 8 UNITS: 100 INJECTION, SOLUTION INTRAVENOUS; SUBCUTANEOUS at 17:39

## 2020-12-20 RX ADMIN — INSULIN LISPRO 8 UNITS: 100 INJECTION, SOLUTION INTRAVENOUS; SUBCUTANEOUS at 12:03

## 2020-12-20 RX ADMIN — HYDROCODONE BITARTRATE AND ACETAMINOPHEN 1 TABLET: 5; 325 TABLET ORAL at 12:03

## 2020-12-20 RX ADMIN — ALPRAZOLAM 0.25 MG: 0.25 TABLET ORAL at 21:08

## 2020-12-20 RX ADMIN — DOCUSATE SODIUM 50MG AND SENNOSIDES 8.6MG 2 TABLET: 8.6; 5 TABLET, FILM COATED ORAL at 08:19

## 2020-12-20 RX ADMIN — INSULIN LISPRO 3 UNITS: 100 INJECTION, SOLUTION INTRAVENOUS; SUBCUTANEOUS at 12:03

## 2020-12-20 RX ADMIN — INSULIN GLARGINE 20 UNITS: 100 INJECTION, SOLUTION SUBCUTANEOUS at 20:25

## 2020-12-20 RX ADMIN — CARVEDILOL 12.5 MG: 12.5 TABLET, FILM COATED ORAL at 08:19

## 2020-12-20 RX ADMIN — Medication 1000 UNITS: at 08:19

## 2020-12-20 RX ADMIN — FAMOTIDINE 20 MG: 20 TABLET, FILM COATED ORAL at 08:19

## 2020-12-20 RX ADMIN — CARVEDILOL 12.5 MG: 12.5 TABLET, FILM COATED ORAL at 17:06

## 2020-12-21 LAB
ANION GAP SERPL CALCULATED.3IONS-SCNC: 9.2 MMOL/L (ref 5–15)
BASOPHILS # BLD AUTO: 0.03 10*3/MM3 (ref 0–0.2)
BASOPHILS NFR BLD AUTO: 0.3 % (ref 0–1.5)
BUN SERPL-MCNC: 38 MG/DL (ref 8–23)
BUN/CREAT SERPL: 28.4 (ref 7–25)
CALCIUM SPEC-SCNC: 8.6 MG/DL (ref 8.6–10.5)
CHLORIDE SERPL-SCNC: 101 MMOL/L (ref 98–107)
CO2 SERPL-SCNC: 28.8 MMOL/L (ref 22–29)
CREAT SERPL-MCNC: 1.34 MG/DL (ref 0.76–1.27)
DEPRECATED RDW RBC AUTO: 44.8 FL (ref 37–54)
EOSINOPHIL # BLD AUTO: 0.22 10*3/MM3 (ref 0–0.4)
EOSINOPHIL NFR BLD AUTO: 2.4 % (ref 0.3–6.2)
ERYTHROCYTE [DISTWIDTH] IN BLOOD BY AUTOMATED COUNT: 13.4 % (ref 12.3–15.4)
GFR SERPL CREATININE-BSD FRML MDRD: 53 ML/MIN/1.73
GLUCOSE BLDC GLUCOMTR-MCNC: 126 MG/DL (ref 70–130)
GLUCOSE BLDC GLUCOMTR-MCNC: 131 MG/DL (ref 70–130)
GLUCOSE BLDC GLUCOMTR-MCNC: 132 MG/DL (ref 70–130)
GLUCOSE BLDC GLUCOMTR-MCNC: 75 MG/DL (ref 70–130)
GLUCOSE SERPL-MCNC: 137 MG/DL (ref 65–99)
HCT VFR BLD AUTO: 30 % (ref 37.5–51)
HGB BLD-MCNC: 9.6 G/DL (ref 13–17.7)
IMM GRANULOCYTES # BLD AUTO: 0.08 10*3/MM3 (ref 0–0.05)
IMM GRANULOCYTES NFR BLD AUTO: 0.9 % (ref 0–0.5)
LYMPHOCYTES # BLD AUTO: 1.29 10*3/MM3 (ref 0.7–3.1)
LYMPHOCYTES NFR BLD AUTO: 14.3 % (ref 19.6–45.3)
MCH RBC QN AUTO: 29.3 PG (ref 26.6–33)
MCHC RBC AUTO-ENTMCNC: 32 G/DL (ref 31.5–35.7)
MCV RBC AUTO: 91.5 FL (ref 79–97)
MONOCYTES # BLD AUTO: 0.78 10*3/MM3 (ref 0.1–0.9)
MONOCYTES NFR BLD AUTO: 8.7 % (ref 5–12)
NEUTROPHILS NFR BLD AUTO: 6.6 10*3/MM3 (ref 1.7–7)
NEUTROPHILS NFR BLD AUTO: 73.4 % (ref 42.7–76)
NRBC BLD AUTO-RTO: 0.1 /100 WBC (ref 0–0.2)
PLATELET # BLD AUTO: 328 10*3/MM3 (ref 140–450)
PMV BLD AUTO: 10.1 FL (ref 6–12)
POTASSIUM SERPL-SCNC: 4.2 MMOL/L (ref 3.5–5.2)
RBC # BLD AUTO: 3.28 10*6/MM3 (ref 4.14–5.8)
SODIUM SERPL-SCNC: 139 MMOL/L (ref 136–145)
WBC # BLD AUTO: 9 10*3/MM3 (ref 3.4–10.8)

## 2020-12-21 PROCEDURE — 25010000002 ENOXAPARIN PER 10 MG: Performed by: HOSPITALIST

## 2020-12-21 PROCEDURE — 97530 THERAPEUTIC ACTIVITIES: CPT

## 2020-12-21 PROCEDURE — 25010000003 CEFTRIAXONE PER 250 MG: Performed by: HOSPITALIST

## 2020-12-21 PROCEDURE — 85025 COMPLETE CBC W/AUTO DIFF WBC: CPT | Performed by: HOSPITALIST

## 2020-12-21 PROCEDURE — 25010000002 ONDANSETRON PER 1 MG: Performed by: HOSPITALIST

## 2020-12-21 PROCEDURE — 82962 GLUCOSE BLOOD TEST: CPT

## 2020-12-21 PROCEDURE — 97110 THERAPEUTIC EXERCISES: CPT

## 2020-12-21 PROCEDURE — 63710000001 INSULIN LISPRO (HUMAN) PER 5 UNITS: Performed by: HOSPITALIST

## 2020-12-21 PROCEDURE — 80048 BASIC METABOLIC PNL TOTAL CA: CPT | Performed by: HOSPITALIST

## 2020-12-21 RX ORDER — METOLAZONE 5 MG/1
10 TABLET ORAL ONCE
Status: COMPLETED | OUTPATIENT
Start: 2020-12-21 | End: 2020-12-21

## 2020-12-21 RX ORDER — CEFTRIAXONE SODIUM 2 G/50ML
2 INJECTION, SOLUTION INTRAVENOUS EVERY 24 HOURS
Status: DISCONTINUED | OUTPATIENT
Start: 2020-12-21 | End: 2020-12-22 | Stop reason: HOSPADM

## 2020-12-21 RX ORDER — HYDRALAZINE HYDROCHLORIDE 50 MG/1
50 TABLET, FILM COATED ORAL EVERY 8 HOURS SCHEDULED
Status: DISCONTINUED | OUTPATIENT
Start: 2020-12-21 | End: 2020-12-22 | Stop reason: HOSPADM

## 2020-12-21 RX ORDER — CARVEDILOL 6.25 MG/1
6.25 TABLET ORAL 2 TIMES DAILY WITH MEALS
Status: DISCONTINUED | OUTPATIENT
Start: 2020-12-21 | End: 2020-12-22 | Stop reason: HOSPADM

## 2020-12-21 RX ADMIN — HYDRALAZINE HYDROCHLORIDE 25 MG: 25 TABLET, FILM COATED ORAL at 13:11

## 2020-12-21 RX ADMIN — ASPIRIN 81 MG: 81 TABLET, COATED ORAL at 08:12

## 2020-12-21 RX ADMIN — INSULIN LISPRO 8 UNITS: 100 INJECTION, SOLUTION INTRAVENOUS; SUBCUTANEOUS at 11:36

## 2020-12-21 RX ADMIN — DOCUSATE SODIUM 50MG AND SENNOSIDES 8.6MG 2 TABLET: 8.6; 5 TABLET, FILM COATED ORAL at 20:41

## 2020-12-21 RX ADMIN — CARVEDILOL 6.25 MG: 12.5 TABLET, FILM COATED ORAL at 17:01

## 2020-12-21 RX ADMIN — HYDRALAZINE HYDROCHLORIDE 25 MG: 25 TABLET, FILM COATED ORAL at 05:26

## 2020-12-21 RX ADMIN — HYDROCODONE BITARTRATE AND ACETAMINOPHEN 1 TABLET: 5; 325 TABLET ORAL at 08:12

## 2020-12-21 RX ADMIN — INSULIN LISPRO 8 UNITS: 100 INJECTION, SOLUTION INTRAVENOUS; SUBCUTANEOUS at 08:13

## 2020-12-21 RX ADMIN — Medication 1000 UNITS: at 08:12

## 2020-12-21 RX ADMIN — METOLAZONE 10 MG: 5 TABLET ORAL at 13:11

## 2020-12-21 RX ADMIN — HYDROCODONE BITARTRATE AND ACETAMINOPHEN 1 TABLET: 5; 325 TABLET ORAL at 23:37

## 2020-12-21 RX ADMIN — TAMSULOSIN HYDROCHLORIDE 0.8 MG: 0.4 CAPSULE ORAL at 08:12

## 2020-12-21 RX ADMIN — ENOXAPARIN SODIUM 40 MG: 40 INJECTION SUBCUTANEOUS at 13:11

## 2020-12-21 RX ADMIN — ONDANSETRON 4 MG: 2 INJECTION INTRAMUSCULAR; INTRAVENOUS at 23:37

## 2020-12-21 RX ADMIN — CEFTRIAXONE SODIUM 2 G: 2 INJECTION, SOLUTION INTRAVENOUS at 17:00

## 2020-12-21 RX ADMIN — DOCUSATE SODIUM 50MG AND SENNOSIDES 8.6MG 2 TABLET: 8.6; 5 TABLET, FILM COATED ORAL at 08:12

## 2020-12-21 RX ADMIN — CARVEDILOL 12.5 MG: 12.5 TABLET, FILM COATED ORAL at 08:12

## 2020-12-21 RX ADMIN — HYDRALAZINE HYDROCHLORIDE 50 MG: 50 TABLET, FILM COATED ORAL at 22:08

## 2020-12-21 RX ADMIN — FAMOTIDINE 20 MG: 20 TABLET, FILM COATED ORAL at 08:12

## 2020-12-21 RX ADMIN — HYDROCODONE BITARTRATE AND ACETAMINOPHEN 1 TABLET: 5; 325 TABLET ORAL at 14:50

## 2020-12-21 RX ADMIN — HYDROCODONE BITARTRATE AND ACETAMINOPHEN 1 TABLET: 5; 325 TABLET ORAL at 01:25

## 2020-12-21 RX ADMIN — SODIUM CHLORIDE, PRESERVATIVE FREE 10 ML: 5 INJECTION INTRAVENOUS at 08:15

## 2020-12-22 ENCOUNTER — TELEPHONE (OUTPATIENT)
Dept: FAMILY MEDICINE CLINIC | Facility: CLINIC | Age: 70
End: 2020-12-22

## 2020-12-22 ENCOUNTER — READMISSION MANAGEMENT (OUTPATIENT)
Dept: CALL CENTER | Facility: HOSPITAL | Age: 70
End: 2020-12-22

## 2020-12-22 VITALS
HEART RATE: 62 BPM | BODY MASS INDEX: 32.81 KG/M2 | TEMPERATURE: 98.8 F | RESPIRATION RATE: 16 BRPM | DIASTOLIC BLOOD PRESSURE: 86 MMHG | OXYGEN SATURATION: 98 % | SYSTOLIC BLOOD PRESSURE: 151 MMHG | WEIGHT: 221.5 LBS | HEIGHT: 69 IN

## 2020-12-22 DIAGNOSIS — A41.51 SEPSIS DUE TO ESCHERICHIA COLI WITHOUT ACUTE ORGAN DYSFUNCTION (HCC): Primary | ICD-10-CM

## 2020-12-22 LAB
ANION GAP SERPL CALCULATED.3IONS-SCNC: 7.9 MMOL/L (ref 5–15)
BASOPHILS # BLD AUTO: 0.03 10*3/MM3 (ref 0–0.2)
BASOPHILS NFR BLD AUTO: 0.3 % (ref 0–1.5)
BUN SERPL-MCNC: 30 MG/DL (ref 8–23)
BUN/CREAT SERPL: 23.8 (ref 7–25)
CALCIUM SPEC-SCNC: 8.5 MG/DL (ref 8.6–10.5)
CHLORIDE SERPL-SCNC: 100 MMOL/L (ref 98–107)
CO2 SERPL-SCNC: 30.1 MMOL/L (ref 22–29)
CREAT SERPL-MCNC: 1.26 MG/DL (ref 0.76–1.27)
DEPRECATED RDW RBC AUTO: 46.2 FL (ref 37–54)
EOSINOPHIL # BLD AUTO: 0.21 10*3/MM3 (ref 0–0.4)
EOSINOPHIL NFR BLD AUTO: 2.4 % (ref 0.3–6.2)
ERYTHROCYTE [DISTWIDTH] IN BLOOD BY AUTOMATED COUNT: 13.5 % (ref 12.3–15.4)
GFR SERPL CREATININE-BSD FRML MDRD: 56 ML/MIN/1.73
GLUCOSE BLDC GLUCOMTR-MCNC: 155 MG/DL (ref 70–130)
GLUCOSE BLDC GLUCOMTR-MCNC: 191 MG/DL (ref 70–130)
GLUCOSE SERPL-MCNC: 160 MG/DL (ref 65–99)
HCT VFR BLD AUTO: 31 % (ref 37.5–51)
HGB BLD-MCNC: 9.8 G/DL (ref 13–17.7)
IMM GRANULOCYTES # BLD AUTO: 0.06 10*3/MM3 (ref 0–0.05)
IMM GRANULOCYTES NFR BLD AUTO: 0.7 % (ref 0–0.5)
LYMPHOCYTES # BLD AUTO: 1.01 10*3/MM3 (ref 0.7–3.1)
LYMPHOCYTES NFR BLD AUTO: 11.5 % (ref 19.6–45.3)
MCH RBC QN AUTO: 29.8 PG (ref 26.6–33)
MCHC RBC AUTO-ENTMCNC: 31.6 G/DL (ref 31.5–35.7)
MCV RBC AUTO: 94.2 FL (ref 79–97)
MONOCYTES # BLD AUTO: 0.63 10*3/MM3 (ref 0.1–0.9)
MONOCYTES NFR BLD AUTO: 7.2 % (ref 5–12)
NEUTROPHILS NFR BLD AUTO: 6.83 10*3/MM3 (ref 1.7–7)
NEUTROPHILS NFR BLD AUTO: 77.9 % (ref 42.7–76)
NRBC BLD AUTO-RTO: 0.1 /100 WBC (ref 0–0.2)
PLATELET # BLD AUTO: 364 10*3/MM3 (ref 140–450)
PMV BLD AUTO: 10.1 FL (ref 6–12)
POTASSIUM SERPL-SCNC: 4.3 MMOL/L (ref 3.5–5.2)
RBC # BLD AUTO: 3.29 10*6/MM3 (ref 4.14–5.8)
SODIUM SERPL-SCNC: 138 MMOL/L (ref 136–145)
WBC # BLD AUTO: 8.77 10*3/MM3 (ref 3.4–10.8)

## 2020-12-22 PROCEDURE — 82962 GLUCOSE BLOOD TEST: CPT

## 2020-12-22 PROCEDURE — 02HV33Z INSERTION OF INFUSION DEVICE INTO SUPERIOR VENA CAVA, PERCUTANEOUS APPROACH: ICD-10-PCS | Performed by: INTERNAL MEDICINE

## 2020-12-22 PROCEDURE — C1751 CATH, INF, PER/CENT/MIDLINE: HCPCS

## 2020-12-22 PROCEDURE — 25010000003 CEFTRIAXONE PER 250 MG: Performed by: HOSPITALIST

## 2020-12-22 PROCEDURE — 63710000001 INSULIN LISPRO (HUMAN) PER 5 UNITS: Performed by: HOSPITALIST

## 2020-12-22 PROCEDURE — 85025 COMPLETE CBC W/AUTO DIFF WBC: CPT | Performed by: HOSPITALIST

## 2020-12-22 PROCEDURE — 80048 BASIC METABOLIC PNL TOTAL CA: CPT | Performed by: HOSPITALIST

## 2020-12-22 PROCEDURE — 25010000002 ENOXAPARIN PER 10 MG: Performed by: HOSPITALIST

## 2020-12-22 RX ORDER — FUROSEMIDE 40 MG/1
40 TABLET ORAL DAILY
Status: DISCONTINUED | OUTPATIENT
Start: 2020-12-22 | End: 2020-12-22 | Stop reason: HOSPADM

## 2020-12-22 RX ORDER — SODIUM CHLORIDE 0.9 % (FLUSH) 0.9 %
20 SYRINGE (ML) INJECTION AS NEEDED
Status: DISCONTINUED | OUTPATIENT
Start: 2020-12-22 | End: 2020-12-22 | Stop reason: HOSPADM

## 2020-12-22 RX ORDER — HYDRALAZINE HYDROCHLORIDE 50 MG/1
50 TABLET, FILM COATED ORAL EVERY 8 HOURS SCHEDULED
Qty: 90 TABLET | Refills: 0 | Status: SHIPPED | OUTPATIENT
Start: 2020-12-22 | End: 2021-01-07 | Stop reason: SDUPTHER

## 2020-12-22 RX ORDER — SODIUM CHLORIDE 0.9 % (FLUSH) 0.9 %
10 SYRINGE (ML) INJECTION AS NEEDED
Status: DISCONTINUED | OUTPATIENT
Start: 2020-12-22 | End: 2020-12-22 | Stop reason: HOSPADM

## 2020-12-22 RX ORDER — SODIUM CHLORIDE 0.9 % (FLUSH) 0.9 %
10 SYRINGE (ML) INJECTION EVERY 12 HOURS SCHEDULED
Status: DISCONTINUED | OUTPATIENT
Start: 2020-12-22 | End: 2020-12-22 | Stop reason: HOSPADM

## 2020-12-22 RX ORDER — CEFTRIAXONE SODIUM 2 G/50ML
2 INJECTION, SOLUTION INTRAVENOUS EVERY 24 HOURS
Qty: 350 ML | Refills: 0 | Status: SHIPPED | OUTPATIENT
Start: 2020-12-23 | End: 2020-12-30

## 2020-12-22 RX ORDER — CARVEDILOL 6.25 MG/1
6.25 TABLET ORAL 2 TIMES DAILY WITH MEALS
Qty: 60 TABLET | Refills: 0 | Status: SHIPPED | OUTPATIENT
Start: 2020-12-22 | End: 2021-01-07 | Stop reason: SDUPTHER

## 2020-12-22 RX ORDER — FAMOTIDINE 20 MG/1
20 TABLET, FILM COATED ORAL DAILY
Qty: 30 TABLET | Refills: 0 | Status: SHIPPED | OUTPATIENT
Start: 2020-12-23 | End: 2021-01-22

## 2020-12-22 RX ADMIN — HYDROCODONE BITARTRATE AND ACETAMINOPHEN 1 TABLET: 5; 325 TABLET ORAL at 05:49

## 2020-12-22 RX ADMIN — DOCUSATE SODIUM 50MG AND SENNOSIDES 8.6MG 2 TABLET: 8.6; 5 TABLET, FILM COATED ORAL at 08:00

## 2020-12-22 RX ADMIN — ASPIRIN 81 MG: 81 TABLET, COATED ORAL at 08:00

## 2020-12-22 RX ADMIN — INSULIN LISPRO 2 UNITS: 100 INJECTION, SOLUTION INTRAVENOUS; SUBCUTANEOUS at 08:01

## 2020-12-22 RX ADMIN — TAMSULOSIN HYDROCHLORIDE 0.8 MG: 0.4 CAPSULE ORAL at 08:00

## 2020-12-22 RX ADMIN — HYDRALAZINE HYDROCHLORIDE 50 MG: 50 TABLET, FILM COATED ORAL at 13:00

## 2020-12-22 RX ADMIN — ENOXAPARIN SODIUM 40 MG: 40 INJECTION SUBCUTANEOUS at 12:48

## 2020-12-22 RX ADMIN — SODIUM CHLORIDE, PRESERVATIVE FREE 10 ML: 5 INJECTION INTRAVENOUS at 12:57

## 2020-12-22 RX ADMIN — INSULIN LISPRO 8 UNITS: 100 INJECTION, SOLUTION INTRAVENOUS; SUBCUTANEOUS at 08:01

## 2020-12-22 RX ADMIN — HYDROCODONE BITARTRATE AND ACETAMINOPHEN 1 TABLET: 5; 325 TABLET ORAL at 12:58

## 2020-12-22 RX ADMIN — Medication 1000 UNITS: at 08:00

## 2020-12-22 RX ADMIN — FUROSEMIDE 40 MG: 40 TABLET ORAL at 10:25

## 2020-12-22 RX ADMIN — INSULIN LISPRO 2 UNITS: 100 INJECTION, SOLUTION INTRAVENOUS; SUBCUTANEOUS at 12:29

## 2020-12-22 RX ADMIN — INSULIN LISPRO 8 UNITS: 100 INJECTION, SOLUTION INTRAVENOUS; SUBCUTANEOUS at 12:28

## 2020-12-22 RX ADMIN — CEFTRIAXONE SODIUM 2 G: 2 INJECTION, SOLUTION INTRAVENOUS at 12:48

## 2020-12-22 RX ADMIN — CARVEDILOL 6.25 MG: 12.5 TABLET, FILM COATED ORAL at 08:01

## 2020-12-22 RX ADMIN — FAMOTIDINE 20 MG: 20 TABLET, FILM COATED ORAL at 08:00

## 2020-12-22 RX ADMIN — Medication 3 MG: at 01:28

## 2020-12-22 RX ADMIN — ALPRAZOLAM 0.25 MG: 0.25 TABLET ORAL at 01:28

## 2020-12-22 RX ADMIN — HYDRALAZINE HYDROCHLORIDE 50 MG: 50 TABLET, FILM COATED ORAL at 06:43

## 2020-12-22 NOTE — TELEPHONE ENCOUNTER
PT BEING DISCHARGED FROM HOSPITAL TOMORROW, NEEDING ORDERS FOR HOME HEALTH, HE IS GOING HOME WITH IV ABX  
Pre-eclampsia in third trimester

## 2020-12-22 NOTE — TELEPHONE ENCOUNTER
Patient's spouse called-patient has had a huge change in his meds and she wanted to know if Dr Avila could look over his chart and call her to discuss.     Example-Dr Adrian had mentioned taking patient off Hydralazine and now his dose is increased.      Patient is home as of today, with a picc line for antibiotics. Beny would also like to discuss possibility of home health coming out to show her/help her with patient's needs.    Since Dr Adrian is leaving, she wants to know if Dr Avila would take over both of their care.     [phone]

## 2020-12-22 NOTE — OUTREACH NOTE
Prep Survey      Responses   [facility] facility patient discharged from?  [facility]   Is LACE score < 7 ?  No   Emergency Room discharge w/ pulse ox?  No   Eligibility  [facility]   Date of Admission  12/16/20   Date of Discharge  12/22/20   Discharge Disposition  Home or Self Care   Discharge diagnosis  E coli bacteremia and sepsis   Does the patient have one of the following disease processes/diagnoses(primary or secondary)?  Sepsis   Does the patient have Home health ordered?  Yes   What is the Home health agency?   [facility], [facility] for iv abx   Is there a DME ordered?  Yes   What DME was ordered?  hospital bed and oxygen via [place]   Prep survey completed?  Yes          Day Ball RN

## 2020-12-23 ENCOUNTER — TRANSITIONAL CARE MANAGEMENT TELEPHONE ENCOUNTER (OUTPATIENT)
Dept: CALL CENTER | Facility: HOSPITAL | Age: 70
End: 2020-12-23

## 2020-12-23 ENCOUNTER — DOCUMENTATION (OUTPATIENT)
Dept: SOCIAL WORK | Facility: HOSPITAL | Age: 70
End: 2020-12-23

## 2020-12-23 LAB
BACTERIA SPEC AEROBE CULT: NORMAL
BACTERIA SPEC AEROBE CULT: NORMAL

## 2020-12-23 NOTE — TELEPHONE ENCOUNTER
I reviewed the medication list.  I think I would follow the medicines that are on the discharge summary for now however if his blood pressure is not within normal range to let us know.  Home health order is okay to give.

## 2020-12-23 NOTE — OUTREACH NOTE
Call Center TCM Note      Responses   [facility] patient discharged from?  [place]   Does the patient have one of the following disease processes/diagnoses(primary or secondary)?  Sepsis   TCM attempt successful?  Yes   Discharge diagnosis  E coli bacteremia and sepsis   Is patient permission given to speak with other caregiver?  Yes   List who call center can speak with  wife   Person spoke with today (if not patient) and relationship  wife   Meds reviewed with patient/caregiver?  Yes   Is the patient having any side effects they believe may be caused by any medication additions or changes?  No   Does the patient have all medications related to this admission filled (includes all antibiotics, inhalers, nebulizers,steroids,etc.)  Yes   Is the patient taking all medications as directed (includes completed medication regime)?  Yes   Medication comments  Pt and wife have alot of concerns about changes to pt BP meds. PCP Dr Adrian was working on getting pt off Hydralazine. Now PCP has retired withlittle notice. Pt hoping to see Dr Dakota Avila in same ofc. Wife has request in for Dr Avila to review records and meds.    Does the patient have a primary care provider?   Yes   Comments regarding PCP  PCP has bee Dr Adrian, he is apparently retired now, and pt hoping to be under care of Dr Dakota Avila   Does the patient have an appointment with their PCP within 7 days of discharge?  No   Has the patient kept scheduled appointments due by today?  Yes   What is the Home health agency?   [facility], [facility] for iv abx   Has home health visited the patient within 72 hours of discharge?  Yes   What DME was ordered?  hospital bed and oxygen via [place]   Psychosocial issues?  No   Did the patient receive a copy of their discharge instructions?  Yes   Nursing interventions  Reviewed instructions with patient   What is the patient's perception of their health status since discharge?  Same   Is the patient/caregiver able to teach back  Sepsis?  S - Shivering,fever or very cold, E - Extreme pain or generalized discomfort (worst ever,especially abdomen), P - Pale or discolored skin, S - Sleepy, difficult to arouse,confused, I -   I feel like I might die-a feeling of hopelessness, S - Short of breath   Is patient/caregiver able to teach back steps to recovery at home?  Set small, achievable goals for return to baseline health, Record milestones and struggles in a journal, Exercise as tolerated, Make a list of questions for PCP appoinment, Learn about sepsis(sepsis.org), Rest and regain strength, Talk about feelings with family/friends, Eat a balanced diet   Is the patient/caregiver able to teach back signs and symptoms of worsening condition:  Fever, Altered mental status(confusion/coma), Edema, Hyperthermia, Rapid heart rate (>90), High blood glucose without diabetes, Shortness of breath/rapid respiratory rate   If the patient is a current smoker, are they able to teach back resources for cessation?  Not a smoker   Is the patient/caregiver able to teach back the hierarchy of who to call/visit for symptoms/problems? PCP, Specialist, Home health nurse, Urgent Care, ED, 911  Yes   TCM call completed?  Yes   Wrap up additional comments  Pt doing fair. PLease see TCM call notes re: pt meds and PCP. Pt and wife very anxious to have BP meds reviewed. Pt also needs TCM [facility] to be sched by 01/05/2021.           Venus Flores MA    12/23/2020, 13:40 EST

## 2020-12-28 ENCOUNTER — TELEPHONE (OUTPATIENT)
Dept: FAMILY MEDICINE CLINIC | Facility: CLINIC | Age: 70
End: 2020-12-28

## 2020-12-29 ENCOUNTER — READMISSION MANAGEMENT (OUTPATIENT)
Dept: CALL CENTER | Facility: HOSPITAL | Age: 70
End: 2020-12-29

## 2020-12-29 ENCOUNTER — LAB REQUISITION (OUTPATIENT)
Dept: LAB | Facility: HOSPITAL | Age: 70
End: 2020-12-29

## 2020-12-29 DIAGNOSIS — B96.20 UNSPECIFIED ESCHERICHIA COLI (E. COLI) AS THE CAUSE OF DISEASES CLASSIFIED ELSEWHERE: ICD-10-CM

## 2020-12-29 DIAGNOSIS — N39.0 URINARY TRACT INFECTION, SITE NOT SPECIFIED: ICD-10-CM

## 2020-12-29 LAB
ANION GAP SERPL CALCULATED.3IONS-SCNC: 5.5 MMOL/L (ref 5–15)
BACTERIA UR QL AUTO: NORMAL /HPF
BASOPHILS # BLD AUTO: 0.03 10*3/MM3 (ref 0–0.2)
BASOPHILS NFR BLD AUTO: 0.3 % (ref 0–1.5)
BILIRUB UR QL STRIP: NEGATIVE
BUN SERPL-MCNC: 17 MG/DL (ref 8–23)
BUN/CREAT SERPL: 16.3 (ref 7–25)
CALCIUM SPEC-SCNC: 8.6 MG/DL (ref 8.6–10.5)
CHLORIDE SERPL-SCNC: 99 MMOL/L (ref 98–107)
CLARITY UR: CLEAR
CO2 SERPL-SCNC: 36.5 MMOL/L (ref 22–29)
COLOR UR: ABNORMAL
CREAT SERPL-MCNC: 1.04 MG/DL (ref 0.76–1.27)
CREAT UR-MCNC: 119.4 MG/DL
DEPRECATED RDW RBC AUTO: 47.5 FL (ref 37–54)
EOSINOPHIL # BLD AUTO: 0.21 10*3/MM3 (ref 0–0.4)
EOSINOPHIL NFR BLD AUTO: 2.4 % (ref 0.3–6.2)
ERYTHROCYTE [DISTWIDTH] IN BLOOD BY AUTOMATED COUNT: 13.5 % (ref 12.3–15.4)
GFR SERPL CREATININE-BSD FRML MDRD: 70 ML/MIN/1.73
GLUCOSE SERPL-MCNC: 196 MG/DL (ref 65–99)
GLUCOSE UR STRIP-MCNC: NEGATIVE MG/DL
HCT VFR BLD AUTO: 32.4 % (ref 37.5–51)
HGB BLD-MCNC: 10.1 G/DL (ref 13–17.7)
HGB UR QL STRIP.AUTO: NEGATIVE
HYALINE CASTS UR QL AUTO: NORMAL /LPF
IMM GRANULOCYTES # BLD AUTO: 0.03 10*3/MM3 (ref 0–0.05)
IMM GRANULOCYTES NFR BLD AUTO: 0.3 % (ref 0–0.5)
KETONES UR QL STRIP: NEGATIVE
LEUKOCYTE ESTERASE UR QL STRIP.AUTO: ABNORMAL
LYMPHOCYTES # BLD AUTO: 0.96 10*3/MM3 (ref 0.7–3.1)
LYMPHOCYTES NFR BLD AUTO: 11 % (ref 19.6–45.3)
MCH RBC QN AUTO: 29.7 PG (ref 26.6–33)
MCHC RBC AUTO-ENTMCNC: 31.2 G/DL (ref 31.5–35.7)
MCV RBC AUTO: 95.3 FL (ref 79–97)
MONOCYTES # BLD AUTO: 0.56 10*3/MM3 (ref 0.1–0.9)
MONOCYTES NFR BLD AUTO: 6.4 % (ref 5–12)
NEUTROPHILS NFR BLD AUTO: 6.95 10*3/MM3 (ref 1.7–7)
NEUTROPHILS NFR BLD AUTO: 79.6 % (ref 42.7–76)
NITRITE UR QL STRIP: NEGATIVE
NRBC BLD AUTO-RTO: 0 /100 WBC (ref 0–0.2)
PH UR STRIP.AUTO: 6.5 [PH] (ref 5–8)
PLATELET # BLD AUTO: 265 10*3/MM3 (ref 140–450)
PMV BLD AUTO: 10.3 FL (ref 6–12)
POTASSIUM SERPL-SCNC: 4.1 MMOL/L (ref 3.5–5.2)
PROT UR QL STRIP: ABNORMAL
PROT UR-MCNC: 49 MG/DL
RBC # BLD AUTO: 3.4 10*6/MM3 (ref 4.14–5.8)
RBC # UR: NORMAL /HPF
REF LAB TEST METHOD: NORMAL
SODIUM SERPL-SCNC: 141 MMOL/L (ref 136–145)
SP GR UR STRIP: 1.02 (ref 1–1.03)
SQUAMOUS #/AREA URNS HPF: NORMAL /HPF
UROBILINOGEN UR QL STRIP: ABNORMAL
WBC # BLD AUTO: 8.74 10*3/MM3 (ref 3.4–10.8)
WBC UR QL AUTO: NORMAL /HPF

## 2020-12-29 PROCEDURE — 80048 BASIC METABOLIC PNL TOTAL CA: CPT | Performed by: INTERNAL MEDICINE

## 2020-12-29 PROCEDURE — 85025 COMPLETE CBC W/AUTO DIFF WBC: CPT | Performed by: INTERNAL MEDICINE

## 2020-12-29 PROCEDURE — 82570 ASSAY OF URINE CREATININE: CPT | Performed by: INTERNAL MEDICINE

## 2020-12-29 PROCEDURE — 84156 ASSAY OF PROTEIN URINE: CPT | Performed by: INTERNAL MEDICINE

## 2020-12-29 PROCEDURE — 81001 URINALYSIS AUTO W/SCOPE: CPT | Performed by: INTERNAL MEDICINE

## 2020-12-29 NOTE — OUTREACH NOTE
Sepsis Week 2 Survey      Responses   [facility] patient discharged from?  [place]   Does the patient have one of the following disease processes/diagnoses(primary or secondary)?  Sepsis   Week 2 attempt successful?  Yes   Call start time  1216   Call end time  1238   Discharge diagnosis  E coli bacteremia and sepsis   Person spoke with today (if not patient) and relationship  wife   Meds reviewed with patient/caregiver?  Yes   Is the patient taking all medications as directed (includes completed medication regime)?  Yes   Comments regarding PCP  Dr Dakota Avila will be PCP   Has the patient kept scheduled appointments due by today?  N/A   What is the Home health agency?   [facility] for [facility]   Home health comments  PT to [facility] today in about 1 hour.   Comments  Wife concern blood work di not get done yesterday by home health. She has cakled the iD MD and home Health to inquire about blood draw. They are working on it.   What is the patient's perception of their health status since discharge?  Same   Additional teach back comments  Wife says urine is dark, advised to increase oral fluids.   Week 2 call completed?  Yes          Niecy Morton RN

## 2020-12-31 DIAGNOSIS — S72.145D CLOSED NONDISPLACED INTERTROCHANTERIC FRACTURE OF LEFT FEMUR WITH ROUTINE HEALING, SUBSEQUENT ENCOUNTER: ICD-10-CM

## 2020-12-31 RX ORDER — HYDROCODONE BITARTRATE AND ACETAMINOPHEN 5; 325 MG/1; MG/1
1 TABLET ORAL EVERY 8 HOURS PRN
Qty: 9 TABLET | Refills: 0 | OUTPATIENT
Start: 2020-12-31

## 2021-01-03 ENCOUNTER — APPOINTMENT (OUTPATIENT)
Dept: GENERAL RADIOLOGY | Facility: HOSPITAL | Age: 71
End: 2021-01-03

## 2021-01-03 ENCOUNTER — HOSPITAL ENCOUNTER (EMERGENCY)
Facility: HOSPITAL | Age: 71
Discharge: HOME | End: 2021-01-03
Attending: EMERGENCY MEDICINE | Admitting: EMERGENCY MEDICINE

## 2021-01-03 VITALS
TEMPERATURE: 98.1 F | DIASTOLIC BLOOD PRESSURE: 81 MMHG | BODY MASS INDEX: 31.81 KG/M2 | WEIGHT: 214.8 LBS | HEIGHT: 69 IN | RESPIRATION RATE: 18 BRPM | OXYGEN SATURATION: 100 % | HEART RATE: 73 BPM | SYSTOLIC BLOOD PRESSURE: 173 MMHG

## 2021-01-03 DIAGNOSIS — R33.9 URINARY RETENTION: ICD-10-CM

## 2021-01-03 DIAGNOSIS — K59.03 DRUG-INDUCED CONSTIPATION: Primary | ICD-10-CM

## 2021-01-03 LAB
ANION GAP SERPL CALCULATED.3IONS-SCNC: 9.8 MMOL/L (ref 5–15)
BASOPHILS # BLD AUTO: 0.06 10*3/MM3 (ref 0–0.2)
BASOPHILS NFR BLD AUTO: 0.9 % (ref 0–1.5)
BILIRUB UR QL STRIP: NEGATIVE
BUN SERPL-MCNC: 14 MG/DL (ref 8–23)
BUN/CREAT SERPL: 13.5 (ref 7–25)
CALCIUM SPEC-SCNC: 8.6 MG/DL (ref 8.6–10.5)
CHLORIDE SERPL-SCNC: 100 MMOL/L (ref 98–107)
CLARITY UR: CLEAR
CO2 SERPL-SCNC: 31.2 MMOL/L (ref 22–29)
COLOR UR: YELLOW
CREAT SERPL-MCNC: 1.04 MG/DL (ref 0.76–1.27)
DEPRECATED RDW RBC AUTO: 45.5 FL (ref 37–54)
EOSINOPHIL # BLD AUTO: 0.14 10*3/MM3 (ref 0–0.4)
EOSINOPHIL NFR BLD AUTO: 2 % (ref 0.3–6.2)
ERYTHROCYTE [DISTWIDTH] IN BLOOD BY AUTOMATED COUNT: 13.4 % (ref 12.3–15.4)
GFR SERPL CREATININE-BSD FRML MDRD: 70 ML/MIN/1.73
GLUCOSE SERPL-MCNC: 144 MG/DL (ref 65–99)
GLUCOSE UR STRIP-MCNC: NEGATIVE MG/DL
HCT VFR BLD AUTO: 33.7 % (ref 37.5–51)
HGB BLD-MCNC: 10.8 G/DL (ref 13–17.7)
HGB UR QL STRIP.AUTO: NEGATIVE
IMM GRANULOCYTES # BLD AUTO: 0.02 10*3/MM3 (ref 0–0.05)
IMM GRANULOCYTES NFR BLD AUTO: 0.3 % (ref 0–0.5)
KETONES UR QL STRIP: NEGATIVE
LEUKOCYTE ESTERASE UR QL STRIP.AUTO: NEGATIVE
LYMPHOCYTES # BLD AUTO: 0.83 10*3/MM3 (ref 0.7–3.1)
LYMPHOCYTES NFR BLD AUTO: 11.8 % (ref 19.6–45.3)
MCH RBC QN AUTO: 29.6 PG (ref 26.6–33)
MCHC RBC AUTO-ENTMCNC: 32 G/DL (ref 31.5–35.7)
MCV RBC AUTO: 92.3 FL (ref 79–97)
MONOCYTES # BLD AUTO: 0.44 10*3/MM3 (ref 0.1–0.9)
MONOCYTES NFR BLD AUTO: 6.3 % (ref 5–12)
NEUTROPHILS NFR BLD AUTO: 5.53 10*3/MM3 (ref 1.7–7)
NEUTROPHILS NFR BLD AUTO: 78.7 % (ref 42.7–76)
NITRITE UR QL STRIP: NEGATIVE
NRBC BLD AUTO-RTO: 0 /100 WBC (ref 0–0.2)
PH UR STRIP.AUTO: 7 [PH] (ref 5–8)
PLATELET # BLD AUTO: 234 10*3/MM3 (ref 140–450)
PMV BLD AUTO: 10.3 FL (ref 6–12)
POTASSIUM SERPL-SCNC: 3.9 MMOL/L (ref 3.5–5.2)
PROT UR QL STRIP: NEGATIVE
RBC # BLD AUTO: 3.65 10*6/MM3 (ref 4.14–5.8)
SODIUM SERPL-SCNC: 141 MMOL/L (ref 136–145)
SP GR UR STRIP: 1.01 (ref 1–1.03)
UROBILINOGEN UR QL STRIP: NORMAL
WBC # BLD AUTO: 7.02 10*3/MM3 (ref 3.4–10.8)

## 2021-01-03 PROCEDURE — 74022 RADEX COMPL AQT ABD SERIES: CPT

## 2021-01-03 PROCEDURE — 96372 THER/PROPH/DIAG INJ SC/IM: CPT

## 2021-01-03 PROCEDURE — 85025 COMPLETE CBC W/AUTO DIFF WBC: CPT | Performed by: EMERGENCY MEDICINE

## 2021-01-03 PROCEDURE — 81003 URINALYSIS AUTO W/O SCOPE: CPT | Performed by: EMERGENCY MEDICINE

## 2021-01-03 PROCEDURE — P9612 CATHETERIZE FOR URINE SPEC: HCPCS

## 2021-01-03 PROCEDURE — 99284 EMERGENCY DEPT VISIT MOD MDM: CPT

## 2021-01-03 PROCEDURE — 25010000002 METHYLNALTREXONE 12 MG/0.6ML SOLUTION: Performed by: EMERGENCY MEDICINE

## 2021-01-03 PROCEDURE — 80048 BASIC METABOLIC PNL TOTAL CA: CPT | Performed by: EMERGENCY MEDICINE

## 2021-01-03 RX ORDER — AMOXICILLIN 250 MG
2 CAPSULE ORAL 2 TIMES DAILY
Qty: 56 TABLET | Refills: 0 | Status: ON HOLD | OUTPATIENT
Start: 2021-01-03 | End: 2021-04-23 | Stop reason: SDUPTHER

## 2021-01-03 RX ORDER — HYDROCODONE BITARTRATE AND ACETAMINOPHEN 5; 325 MG/1; MG/1
1 TABLET ORAL EVERY 6 HOURS PRN
Status: DISCONTINUED | OUTPATIENT
Start: 2021-01-03 | End: 2021-01-04 | Stop reason: HOSPADM

## 2021-01-03 RX ORDER — POLYETHYLENE GLYCOL 3350 17 G/17G
17 POWDER, FOR SOLUTION ORAL DAILY
Qty: 14 PACKET | Refills: 0 | Status: SHIPPED | OUTPATIENT
Start: 2021-01-03 | End: 2021-04-07

## 2021-01-03 RX ADMIN — METHYLNALTREXONE BROMIDE 12 MG: 12 INJECTION, SOLUTION SUBCUTANEOUS at 20:12

## 2021-01-03 RX ADMIN — HYDROCODONE BITARTRATE AND ACETAMINOPHEN 1 TABLET: 5; 325 TABLET ORAL at 17:30

## 2021-01-03 NOTE — ED NOTES
Pt presents to ED via EMS from home. Pt complains of a fall on 12/1/2020 with a fall and L broken hip. Pt reports today he is having urinary retention and constipation over the last week. Pt reports he has on hydrocodone but has not been taking stool soft. Pt is A&OX4, able to stand and sit, and in a mask at this time.      Humberto Avila, RN  01/03/21 5430

## 2021-01-03 NOTE — ED PROVIDER NOTES
EMERGENCY DEPARTMENT ENCOUNTER    Room Number:  25/25  Date of encounter:  1/3/2021  PCP: Dakota Avila MD  Historian: Patient      HPI:  Chief Complaint: Urinary retention, constipation.      Context: Dilip Verma is a 70 y.o. male who presents to the ED c/o The patient reports that on December 1 he fell and injured his left hip and leg.  He was hospitalized and had surgery.  He states that since then he has been in rehab and is on pain medicine.  He is not on a bowel regimen.  He reports that he was also being treated for urinary tract infection.  He reports he has not had a normal bowel movement in the last week.  He reports that he developed dysuria again today.  He states that it has been about a week since he was finished his antibiotics.  He denies abdominal pain.  He denies fevers.  Nothing makes his symptoms worse or better.  He reports that he had a small hard stool earlier today.      PAST MEDICAL HISTORY  Active Ambulatory Problems     Diagnosis Date Noted   • Anxiety    • Colon polyp    • Diabetes mellitus, type II (CMS/[facility])    • Erectile dysfunction    • Hyperlipidemia    • CAD (coronary artery disease) 07/20/2019   • Hx of CABG 08/19/2019   • Chronic diastolic CHF (congestive heart failure) (CMS/[facility]) 09/11/2019   • Hypersomnia due to medical condition 09/14/2019   • CSA (central sleep apnea) 09/14/2019   • Periodic breathing 09/14/2019   • HTN (hypertension) 02/26/2020   • Hypoxia 02/26/2020   • Closed nondisplaced intertrochanteric fracture of left femur (CMS/[facility]) 12/01/2020   • History of stroke 12/01/2020   • CKD (chronic kidney disease) stage 3, GFR 30-59 ml/min 12/01/2020   • Urinary retention 12/02/2020   • AMENA (acute kidney injury) (CMS/[facility]) 12/03/2020   • Acute posthemorrhagic anemia 12/04/2020   • Complicated UTI (urinary tract infection) 12/16/2020     Resolved Ambulatory Problems     Diagnosis Date Noted   • NSTEMI (non-ST elevated myocardial infarction) (CMS/[facility]) 07/12/2019   •  Orthostasis 07/20/2019     Past Medical History:   Diagnosis Date   • Chronic diastolic (congestive) heart failure (CMS/[facility])    • Chronic kidney disease    • Coronary artery disease    • H/O bone density study never   • Hypersomnia    • Hypertension    • Kidney stone    • Routine eye exam 2015   • Sleep apnea    • Stroke (CMS/[facility])          PAST SURGICAL HISTORY  Past Surgical History:   Procedure Laterality Date   • CARDIAC CATHETERIZATION N/A 7/15/2019    Procedure: Coronary angiography;  Surgeon: Carrie Price MD;  Location:  [facility] CATH [facility]VASIVE [facility]ATION;  Service: Cardiovascular   • CARDIAC CATHETERIZATION N/A 7/15/2019    Procedure: Left Heart Cath;  Surgeon: Carrie Price MD;  Location:  [facility];  Service: Cardiovascular   • CARDIAC CATHETERIZATION N/A 7/15/2019    Procedure: Left ventriculography;  Surgeon: Carrie Price MD;  Location:  [facility]VASIVE [facility]ATION;  Service: Cardiovascular   • CARDIAC CATHETERIZATION  7/15/2019    Procedure: Functional Flow [place];  Surgeon: Carrie Price MD;  [facility];  Service: Cardiovascular   • CARDIAC CATHETERIZATION N/A 11/6/2019    Procedure: Right and Left Heart Cath;  Surgeon: Mya Smith MD;  Location:  [facility]ATH INVASIVE LOCATION;  Service: Cardiovascular   • CARDIAC CATHETERIZATION N/A 11/6/2019    Procedure: Coronary angiography;  Surgeon: Mya Smith MD;  [facility];  Service: Cardiovascular   • CATARACT EXTRACTION EXTRACAPSULAR W/ INTRAOCULAR LENS IMPLANTATION Bilateral    • COLONOSCOPY  01/2015    dr [email or web address]   • CORONARY ARTERY BYPASS GRAFT N/A 7/18/2019    Procedure: INTRAOPERATIVE SHOAIB; STERNOTOMY CORONARY ARTERY BYPASS x 3  USING LEFT INTERNAL MAMMARY ARTERY GRAFT UTILIZING ENDOSCOPICALLY HARVESTED RIGHT GREATER SAPHENOUS VEIN AND PRP.;  Surgeon: Bill Devi MD;  Location: [facility] MAIN OR;  Service: Cardiothoracic   • DENTAL PROCEDURE      3 surgeries inder  implants   • FEMUR IM NAILING/RODDING Left 12/3/2020    Procedure: LEFT HIP INTRAMEDULLARY NAIL;  Surgeon: Niraj Ravi MD;  Location: [facility] MAIN OR;  Service: Orthopedic Spine;  Laterality: Left;   • HERNIA REPAIR      inguinal bilaterally   • KIDNEY STONE SURGERY      lithotripsy         FAMILY HISTORY  Family History   Problem Relation Age of Onset   • Depression Mother    • Cancer Mother         uterine   • Arrhythmia Mother    • Heart disease Father    • Hypertension Father    • Kidney disease Father    • Thyroid disease Father    • Depression Sister    • Alcohol abuse Brother    • Alcohol abuse Other    • Alcohol abuse Other    • Lung disease Other    • Thyroid disease Other    • Glaucoma Other    • Heart attack Other    • Stroke Maternal Grandmother    • Stroke Paternal Grandmother          SOCIAL HISTORY  Social History     Socioeconomic History   • Marital status:      Spouse name: Not on file   • Number of children: Not on file   • Years of education: Not on file   • Highest education level: Not on file   Tobacco Use   • Smoking status: Former Smoker     Packs/day: 0.75     Years: 16.00     Pack years: 12.00     Quit date:      Years since quittin.0   • Smokeless tobacco: Never Used   • Tobacco comment: Start age:39/Stopping age:47, 3/4 PPD   Substance and Sexual Activity   • Alcohol use: No     Comment: caffeine use - 1 cup daily   • Drug use: No   • Sexual activity: Defer         ALLERGIES  Ace inhibitors, Clonidine derivatives, Losartan, Amlodipine, Ativan [lorazepam], Minoxidil, and Tetracycline        REVIEW OF SYSTEMS  Review of Systems   Denies abdominal pain, denies diarrhea, denies fever, denies chills.  All systems reviewed and negative except for those discussed in HPI.       PHYSICAL EXAM    I have reviewed the triage vital signs and nursing notes.    ED Triage Vitals   Temp Heart Rate Resp BP SpO2   21 1659 21 1658 21 1658 21 1658 21 1658    98.1 °F (36.7 °C) 74 18 155/96 94 %      Temp src Heart Rate Source Patient Position BP Location FiO2 (%)   01/03/21 1659 01/03/21 1658 01/03/21 1658 01/03/21 1658 --   Tympanic Monitor Sitting Right arm        Physical Exam  GENERAL: Awake, alert not distressed  HENT: nares patent  EYES: no scleral icterus  CV: regular rhythm, regular rate  RESPIRATORY: normal effort  ABDOMEN: soft  MUSCULOSKELETAL: no deformity.  Left lower extremity hip and knee incisions are well-healed.  NEURO: alert, moves all extremities, follows commands  SKIN: warm, dry        LAB RESULTS  Recent Results (from the past 24 hour(s))   Urinalysis With Culture If Indicated - Urine, Catheter    Collection Time: 01/03/21  5:31 PM    Specimen: Urine, Catheter   Result Value Ref Range    Color, UA Yellow Yellow, Straw    Appearance, UA Clear Clear    pH, UA 7.0 5.0 - 8.0    Specific Gravity, UA 1.007 1.005 - 1.030    Glucose, UA Negative Negative    Ketones, UA Negative Negative    Bilirubin, UA Negative Negative    Blood, UA Negative Negative    Protein, UA Negative Negative    Leuk Esterase, UA Negative Negative    Nitrite, UA Negative Negative    Urobilinogen, UA 0.2 E.U./dL 0.2 - 1.0 E.U./dL   Basic Metabolic Panel    Collection Time: 01/03/21  5:36 PM    Specimen: Blood   Result Value Ref Range    Glucose 144 (H) 65 - 99 mg/dL    BUN 14 8 - 23 mg/dL    Creatinine 1.04 0.76 - 1.27 mg/dL    Sodium 141 136 - 145 mmol/L    Potassium 3.9 3.5 - 5.2 mmol/L    Chloride 100 98 - 107 mmol/L    CO2 31.2 (H) 22.0 - 29.0 mmol/L    Calcium 8.6 8.6 - 10.5 mg/dL    eGFR Non African Amer 70 >60 mL/min/1.73    BUN/Creatinine Ratio 13.5 7.0 - 25.0    Anion Gap 9.8 5.0 - 15.0 mmol/L   CBC Auto Differential    Collection Time: 01/03/21  5:36 PM    Specimen: Blood   Result Value Ref Range    WBC 7.02 3.40 - 10.80 10*3/mm3    RBC 3.65 (L) 4.14 - 5.80 10*6/mm3    Hemoglobin 10.8 (L) 13.0 - 17.7 g/dL    Hematocrit 33.7 (L) 37.5 - 51.0 %    MCV 92.3 79.0 - 97.0 fL     MCH 29.6 26.6 - 33.0 pg    MCHC 32.0 31.5 - 35.7 g/dL    RDW 13.4 12.3 - 15.4 %    RDW-SD 45.5 37.0 - 54.0 fl    MPV 10.3 6.0 - 12.0 fL    Platelets 234 140 - 450 10*3/mm3    Neutrophil % 78.7 (H) 42.7 - 76.0 %    Lymphocyte % 11.8 (L) 19.6 - 45.3 %    Monocyte % 6.3 5.0 - 12.0 %    Eosinophil % 2.0 0.3 - 6.2 %    Basophil % 0.9 0.0 - 1.5 %    Immature Grans % 0.3 0.0 - 0.5 %    Neutrophils, Absolute 5.53 1.70 - 7.00 10*3/mm3    Lymphocytes, Absolute 0.83 0.70 - 3.10 10*3/mm3    Monocytes, Absolute 0.44 0.10 - 0.90 10*3/mm3    Eosinophils, Absolute 0.14 0.00 - 0.40 10*3/mm3    Basophils, Absolute 0.06 0.00 - 0.20 10*3/mm3    Immature Grans, Absolute 0.02 0.00 - 0.05 10*3/mm3    nRBC 0.0 0.0 - 0.2 /100 WBC       Ordered the above labs and independently reviewed the results.        RADIOLOGY  Xr Abdomen 2+ Vw With Chest 1 Vw    Result Date: 1/3/2021  ACUTE ABDOMEN SERIES  HISTORY: Constipation and dysuria for one week. History of hypertension and diabetes.  COMPARISON: AP chest 12/16/2020  FINDINGS: There is no evidence of free intraperitoneal gas.  FLAT AND UPRIGHT ABDOMEN: There are no dilated bowel loops to suggest bowel obstruction. There has been previous gamma nail fixation of the left proximal femur. Phleboliths present in the lower pelvis. Vascular calcifications are present.  AP chest demonstrates median sternotomy wires and coronary artery markers. There is linear scarring or atelectasis within the left lower lobe. There is also blunting of the right costophrenic angle that appears similar to prior exam of 12/16/2020 and associated with most likely scarring or atelectasis. A left-sided PICC tip is in the SVC.      1. Nonobstructive bowel gas pattern. 2. No significant change in appearance of the chest compared to prior exam 12/16/2020 with most likely basilar atelectasis or scarring. 3. Left-sided PICC tip is in the SVC.  This report was finalized on 1/3/2021 9:06 PM by Dr. Zechariah Falcon M.D.        I  ordered the above noted radiological studies. Reviewed by me and discussed with radiologist.  See dictation for official radiology interpretation.      PROCEDURES    Procedures      MEDICATIONS GIVEN IN ER    Medications   HYDROcodone-acetaminophen (NORCO) 5-325 MG per tablet 1 tablet (1 tablet Oral Given 1/3/21 1730)   methylnaltrexone (RELISTOR) injection 12 mg (12 mg Subcutaneous Given 1/3/21 2012)         PROGRESS, DATA ANALYSIS, CONSULTS, AND MEDICAL DECISION MAKING    All labs have been independently reviewed by me.  All radiology studies have been reviewed by me and discussed with radiologist dictating the report.   EKG's independently viewed and interpreted by me.  Discussion below represents my analysis of pertinent findings related to patient's condition, differential diagnosis, treatment plan and final disposition.        ED Course as of Jan 03 2217   Sun Jan 03, 2021 1737 Bladder scan with 500 cc present.  In and out cath placed by nurse with good results.    [TR]   2138 Lab results with no UTI.  Hemoglobin at baseline.  Normal chemistries.    [TR]   2203 Reviewed work-up and findings with the patient.  He has a normal laboratory evaluation.  His x-ray shows a mild to moderate stool burden.  He has had an enema and Relistor here.  He has had a small stool output here.  I offered further treatment here however he declines.  He states he would like to go home with a bowel regimen.  I feel that he had urinary retention related to constipation.  Plan discharge home.  He is agreeable.    [TR]   2210 He has not urinated since he has been here except for the catheter.  He has a bladder scan with 180 cc in the bladder.  He states he does not need to urinate.  I discussed with him my concern that he will still have urinary retention as his constipation has not completely cleared.  He states he still wants to go home and he understands the risk that he may have to return with the same issue.  He states he is  fairly confident that if he gets home and is comfortable that he will be able to pass the stool on his own.  He states he has significant anxiety about defecating in the situation.    [TR]      ED Course User Index  [TR] Candido Arroyo MD           PPE: Both the patient and I wore a surgical mask throughout the entire patient encounter. I wore protective goggles.     AS OF 22:17 EST VITALS:    BP - 149/76  HR - 71  TEMP - 98.1 °F (36.7 °C) (Tympanic)  O2 SATS - 96%        DIAGNOSIS  Final diagnoses:   Drug-induced constipation   Urinary retention         DISPOSITION  Discharged home           Candido Arroyo MD  01/03/21 [id number]

## 2021-01-04 NOTE — ED NOTES
NAHEED Knox contacted this RN for pt's address and condition requiring EMS transport.  Info given, crew is on way back from previous transport.       Juliette Bernabe, RN  01/03/21 [id number]

## 2021-01-05 ENCOUNTER — READMISSION MANAGEMENT (OUTPATIENT)
Dept: CALL CENTER | Facility: HOSPITAL | Age: 71
End: 2021-01-05

## 2021-01-05 ENCOUNTER — TELEPHONE (OUTPATIENT)
Dept: FAMILY MEDICINE CLINIC | Facility: CLINIC | Age: 71
End: 2021-01-05

## 2021-01-05 NOTE — TELEPHONE ENCOUNTER
bp 143/76 today  Last night 82/43 midnight  119/47 at 11:00 pm  bp meds    Lasix 40 qd  Carvedilol 12.5 bid

## 2021-01-05 NOTE — OUTREACH NOTE
Sepsis Week 3 Survey      Responses   [facility] patient discharged from?  [place]   Does the patient have one of the following disease processes/diagnoses(primary or secondary)?  Sepsis   Week 3 attempt successful?  Yes   Call start time  1353   Call end time  1403   Discharge diagnosis  E coli bacteremia and sepsis   Person spoke with today (if not patient) and relationship  wife   Meds reviewed with patient/caregiver?  Yes   Is the patient taking all medications as directed (includes completed medication regime)?  Yes   Medication comments  B/P medications adjusted due to hypotension   Has the patient kept scheduled appointments due by today?  N/A   Comments  Televisit with 01/11/2021   Comments  IV antibiotics completed. PICC line removed.   What is the patient's perception of their health status since discharge?  Same   Week 3 call completed?  Yes          Niecy Morton RN

## 2021-01-05 NOTE — TELEPHONE ENCOUNTER
Try with holding the evening carvedilol and see what your blood pressure is running.  Let us know what it is running in the morning.  Monitor closely.  If you become symptomatic go to the emergency room.

## 2021-01-05 NOTE — TELEPHONE ENCOUNTER
Patient called in stating the patient has had low blood pressure, she says they spoke with the Nephrologist which he was told to speak with his pcp.    Best call back # [phone]

## 2021-01-07 ENCOUNTER — OFFICE VISIT (OUTPATIENT)
Dept: CARDIOLOGY | Facility: CLINIC | Age: 71
End: 2021-01-07

## 2021-01-07 VITALS — DIASTOLIC BLOOD PRESSURE: 89 MMHG | SYSTOLIC BLOOD PRESSURE: 161 MMHG

## 2021-01-07 DIAGNOSIS — Z95.1 HX OF CABG: ICD-10-CM

## 2021-01-07 DIAGNOSIS — E78.2 MIXED HYPERLIPIDEMIA: ICD-10-CM

## 2021-01-07 DIAGNOSIS — E11.9 TYPE 2 DIABETES MELLITUS WITHOUT COMPLICATION, UNSPECIFIED WHETHER LONG TERM INSULIN USE (HCC): ICD-10-CM

## 2021-01-07 DIAGNOSIS — I10 ESSENTIAL HYPERTENSION: ICD-10-CM

## 2021-01-07 DIAGNOSIS — G47.33 OBSTRUCTIVE SLEEP APNEA SYNDROME: ICD-10-CM

## 2021-01-07 DIAGNOSIS — I25.10 CORONARY ARTERY DISEASE INVOLVING NATIVE CORONARY ARTERY OF NATIVE HEART WITHOUT ANGINA PECTORIS: Primary | ICD-10-CM

## 2021-01-07 PROCEDURE — 99443 PR PHYS/QHP TELEPHONE EVALUATION 21-30 MIN: CPT | Performed by: NURSE PRACTITIONER

## 2021-01-07 RX ORDER — HYDRALAZINE HYDROCHLORIDE 25 MG/1
25 TABLET, FILM COATED ORAL 2 TIMES DAILY PRN
Qty: 90 TABLET | Refills: 0
Start: 2021-01-07 | End: 2021-04-23 | Stop reason: HOSPADM

## 2021-01-07 RX ORDER — CARVEDILOL 12.5 MG/1
12.5 TABLET ORAL 2 TIMES DAILY WITH MEALS
Qty: 60 TABLET | Refills: 0
Start: 2021-01-07 | End: 2021-03-12 | Stop reason: SDUPTHER

## 2021-01-07 RX ORDER — TAMSULOSIN HYDROCHLORIDE 0.4 MG/1
0.8 CAPSULE ORAL 2 TIMES DAILY
Qty: 30 CAPSULE
Start: 2021-01-07 | End: 2021-02-24

## 2021-01-07 NOTE — PROGRESS NOTES
Date of Office Visit: 21  Encounter Provider: LEIDY Mejía  Place of Service: [facility] CARDIOLOGY  Patient Name: Dilip Verma  :1950    Chief Complaint   Patient presents with   • Hypertension   • Follow-up   :     HPI: Dilip Verma is a 70 y.o. male  with history of hypertension, hyperlipidemia, diabetes mellitus, coronary artery disease, congestive heart failure, thalamic hemorrhage, pulmonary hypertension, and obstructive sleep apnea. He is followed by Dr. Hylton. I will see him in follow up today.      He was admitted to [facility] in 2019 for syncope.  He was orthostatic with supine hypertension.  Clonidine was discontinued and hydralazine was increased.  He then presented to [facility] on 2019 with uncontrolled blood pressure and blurry vision.  He had an abnormal BNP and troponin.  Echocardiogram showed an ejection fraction of 53%, moderate left ventricular hypertrophy, mild to moderate left atrial enlargement and no significant valvular disease.  He had a Holter monitor which showed nonsustained atrial tachycardia.  Due to his elevated troponin he had heart catheterization which showed normal ejection fraction with multivessel coronary artery disease.  Carotid duplex showed mild bilateral carotid artery stenosis.  Transthoracic echocardiogram showed normal left ventricular systolic function with an ejection fraction of 55%, grade 3 diastolic dysfunction, no significant valvular disease.  He did have severe pulmonary hypertension on that.  He underwent coronary artery bypass grafting with LIMA to LAD, vein graft to obtuse marginal 1, vein graft to distal right coronary artery.  He had some postoperative bradycardia and his beta-blocker was held.  He then had postoperative atrial fibrillation and was discharged on amiodarone.     He then was readmitted in early 2019 with chest pain and non-STEMI.  Perfusion stress test  suggested inferior wall and lateral wall ischemia.  Echocardiogram showed normal left ventricular systolic function with an EF of 62%, moderate left ventricular hypertrophy, grade 2 diastolic dysfunction and severe pulmonary hypertension.  Heart catheterization completed 11/6/2019 showed widely patent grafts.  His PA pressure was 80/30 with a wedge of 20.  He received IV diureses.  He had increased creatinine and BUN.  He was transition back to p.o. Lasix.  He was to follow-up outpatient with nephrology.  He then was in the emergency department for left shoulder pain and was diagnosed with radiculopathy.  He had some insomnia and sleep disturbance which improved after starting trazodone.  That he was not tolerating BiPAP well.  His nephrologist later stop potassium.  On December 1, 2020 he fell and broke his leg.  He had surgery for that.    He was admitted in December 2020 with E. coli bacteremia and sepsis.  He had some elevated blood pressure and hydralazine was adjusted.  Carvedilol was adjusted.  We visit today via telephone.  Patient's wife is providing majority of the history but I am on speaker and patient is in the background.  After discharge his systolic blood pressure was getting to 100.  They discussed this with his PCP who suggested holding hydralazine altogether and increasing carvedilol from 6.25 mg to 12.5 mg twice daily.  They have done that for the last several days.  His blood pressure this morning was 160 systolic.  That was before carvedilol.  His heart rate has been in the 60s.  He is not having chest pain tightness or pressure.  His lightheadedness improved with stopping hydralazine.  He is had no syncope.  Allergies   Allergen Reactions   • Ace Inhibitors Angioedema   • Clonidine Derivatives Unknown - High Severity     Passes out   • Losartan Angioedema     Angioneurotic edema   • Amlodipine Swelling   • Ativan [Lorazepam] Irritability   • Minoxidil Confusion   • Tetracycline Other (See  Comments)     bliisters in mouth             Family and social history reviewed.     ROS  All other systems were reviewed and are negative          Objective:     Vitals:    01/07/21 0926   BP: 161/89     There is no height or weight on file to calculate BMI.    PHYSICAL EXAM:  Physical Exam  Unable to assess  Procedures  Unable to assess    Current Outpatient Medications   Medication Sig Dispense Refill   • acetaminophen (TYLENOL) 325 MG tablet Take 2 tablets by mouth Every 4 (Four) Hours As Needed for Mild Pain .     • aspirin 81 MG EC tablet Take 81 mg by mouth Daily.     • carvedilol (COREG) 12.5 MG tablet Take 1 tablet by mouth 2 (Two) Times a Day With Meals for 30 days. 60 tablet 0   • Cholecalciferol (Vitamin D) 125 MCG (5000 UT) capsule capsule Take 5,000 Units by mouth Daily.     • famotidine (PEPCID) 20 MG tablet Take 1 tablet by mouth Daily for 30 days. 30 tablet 0   • furosemide (LASIX) 40 MG tablet Take 1 tablet by mouth Daily.     • hydrALAZINE (APRESOLINE) 25 MG tablet Take 1 tablet by mouth 2 (Two) Times a Day As Needed (for SBP > 160). 90 tablet 0   • HYDROcodone-acetaminophen (NORCO) 5-325 MG per tablet Take 1 tablet by mouth Every 8 (Eight) Hours As Needed for Moderate Pain . 9 tablet 0   • insulin glargine (LANTUS) 100 UNIT/ML injection Inject 20 Units under the skin into the appropriate area as directed Every Night.  12   • insulin lispro (humaLOG) 100 UNIT/ML injection Inject 0-14 Units under the skin into the appropriate area as directed 3 (Three) Times a Day Before Meals.  12   • melatonin 3 MG tablet Take 3 mg by mouth Every Night.     • polyethylene glycol (MIRALAX) 17 g packet Take 17 g by mouth Daily. 14 packet 0   • sennosides-docusate (senna-docusate sodium) 8.6-50 MG per tablet Take 2 tablets by mouth 2 (two) times a day. 56 tablet 0   • tamsulosin (FLOMAX) 0.4 MG capsule 24 hr capsule Take 2 capsules by mouth 2 (Two) Times a Day. 30 capsule      No current facility-administered  medications for this visit.      Assessment:       Diagnosis Plan   1. Coronary artery disease involving native coronary artery of native heart without angina pectoris     2. Mixed hyperlipidemia     3. Essential hypertension     4. Type 2 diabetes mellitus without complication, unspecified whether long term insulin use (CMS/[facility])     5. Obstructive sleep apnea syndrome     6. Hx of CABG          No orders of the defined types were placed in this encounter.        Plan:      1.  70-year-old gentleman with coronary artery disease status post coronary artery bypass grafting times 3 July 2019 with LIMA to LAD, vein graft to obtuse marginal 1, vein graft to distal right coronary artery with normal left ventricular systolic function.  Then non-STEMI and  inferolateral wall ischemia on perfusion stress test.  Cardiac catheterization 11/2019 showed patent grafts normal left ventricular systolic function.  -No angina continue the same  2.  Hypertension .  Historically he has been labile.  Hydralazine was added scheduled last month but blood pressure got low. He will use hydralazine 25 mg PRN for SBP > 160. Carvedilol 12.5 mg twice daily to continue.  Heart rate has been 60s.  Historically he has had some bradycardia with beta-blocker will continue to monitor closely   3.  Hyperlipidemia- unclear why he is not on statin therapy  4.  Diabetes mellitus followed by PCP  5.  Obstructive sleep apnea still having difficulty tolerating BiPAP.  Follows with Dr. Cai   6.  Pulmonary hypertension  7.  Postoperative atrial fibrillation- no known recurrence  8.  Bilateral carotid artery stenosis mild on duplex July 2019  9.  Grade 3 diastolic dysfunction on echocardiogram July 2019  10. Remote history of thalamic hemorrhage noted on MRI July 2019  11. History of hypogonadism with prior testosterone replacement treatment.    12. Chronic diastolic congestive heart failure he is euvolemic on current dose continue the same  13. Chronic  kidney disease follows with nephrology       This patient has consented to a telehealth visit via telephone. The visit was scheduled as a telephone visit to comply with patient safety concerns in accordance with CDC recommendations.  All vitals recorded within this visit are reported by the patient.  I spent  35  minutes in total including but not limited to the 22 minutes spent in direct conversation with this patient and wife.     I will call in a week to see how his blood pressure is running.  Hydralazine is to be used as needed as above.  They have requested to switch from Dr. Hylton so I will have him meet with Dr. Reyes in 2-3 months.         It has been a pleasure to participate in this patient's care.      Thank you,  LEIDY Mejía      **I used Dragon to dictate this note:**

## 2021-01-08 ENCOUNTER — TELEPHONE (OUTPATIENT)
Dept: FAMILY MEDICINE CLINIC | Facility: CLINIC | Age: 71
End: 2021-01-08

## 2021-01-08 NOTE — TELEPHONE ENCOUNTER
He had appt with cardiology yesterday, he needs to check with cardiology as looks like they gave hydralazine, ER if BP >190/90 and does not improve after resting 30 min-1 hour

## 2021-01-08 NOTE — TELEPHONE ENCOUNTER
Caller: Beny Verma    Relationship to patient: Emergency Contact    Best call back number:[phone] (H)      Patient is needing: PATIENTS WIFE REQUESTING A CALLBACK REGARDING PATIENTS BLOOD PRESSURE RESULTS

## 2021-01-08 NOTE — TELEPHONE ENCOUNTER
Patients blood pressure had dropped 1/6 meds were adjusted hes taking Coreg 12.5 bid,Tamsulosin .4 bid  Furosemide 40mg daily   Readings 1/6   Am 162/81    6 pm  168/91 so she gave him the second dose of daily meds                   1/7   Am 161/89 then took am meds 2pm 173/59  9pm 188/98 took second dose of daily meds.  He has a video apt with Dr Avila Monday at 5

## 2021-01-09 ENCOUNTER — APPOINTMENT (OUTPATIENT)
Dept: GENERAL RADIOLOGY | Facility: HOSPITAL | Age: 71
End: 2021-01-09

## 2021-01-09 ENCOUNTER — HOSPITAL ENCOUNTER (EMERGENCY)
Facility: HOSPITAL | Age: 71
Discharge: HOME | End: 2021-01-09
Attending: EMERGENCY MEDICINE | Admitting: EMERGENCY MEDICINE

## 2021-01-09 VITALS
BODY MASS INDEX: 30.51 KG/M2 | RESPIRATION RATE: 18 BRPM | SYSTOLIC BLOOD PRESSURE: 193 MMHG | HEART RATE: 70 BPM | OXYGEN SATURATION: 95 % | WEIGHT: 206 LBS | HEIGHT: 69 IN | TEMPERATURE: 96.4 F | DIASTOLIC BLOOD PRESSURE: 96 MMHG

## 2021-01-09 DIAGNOSIS — R10.30 LOWER ABDOMINAL PAIN: ICD-10-CM

## 2021-01-09 DIAGNOSIS — R09.02 HYPOXIA: ICD-10-CM

## 2021-01-09 DIAGNOSIS — R33.8 ACUTE URINARY RETENTION: Primary | ICD-10-CM

## 2021-01-09 LAB
ALBUMIN SERPL-MCNC: 3.6 G/DL (ref 3.5–5.2)
ALBUMIN/GLOB SERPL: 1 G/DL
ALP SERPL-CCNC: 250 U/L (ref 39–117)
ALT SERPL W P-5'-P-CCNC: 38 U/L (ref 1–41)
ANION GAP SERPL CALCULATED.3IONS-SCNC: 11.7 MMOL/L (ref 5–15)
AST SERPL-CCNC: 42 U/L (ref 1–40)
BASOPHILS # BLD AUTO: 0.03 10*3/MM3 (ref 0–0.2)
BASOPHILS NFR BLD AUTO: 0.4 % (ref 0–1.5)
BILIRUB SERPL-MCNC: 1.6 MG/DL (ref 0–1.2)
BILIRUB UR QL STRIP: NEGATIVE
BUN SERPL-MCNC: 12 MG/DL (ref 8–23)
BUN/CREAT SERPL: 12.5 (ref 7–25)
CALCIUM SPEC-SCNC: 9 MG/DL (ref 8.6–10.5)
CHLORIDE SERPL-SCNC: 97 MMOL/L (ref 98–107)
CLARITY UR: CLEAR
CO2 SERPL-SCNC: 29.3 MMOL/L (ref 22–29)
COLOR UR: YELLOW
CREAT SERPL-MCNC: 0.96 MG/DL (ref 0.76–1.27)
DEPRECATED RDW RBC AUTO: 46.8 FL (ref 37–54)
EOSINOPHIL # BLD AUTO: 0.08 10*3/MM3 (ref 0–0.4)
EOSINOPHIL NFR BLD AUTO: 1.1 % (ref 0.3–6.2)
ERYTHROCYTE [DISTWIDTH] IN BLOOD BY AUTOMATED COUNT: 13.8 % (ref 12.3–15.4)
GFR SERPL CREATININE-BSD FRML MDRD: 77 ML/MIN/1.73
GLOBULIN UR ELPH-MCNC: 3.7 GM/DL
GLUCOSE SERPL-MCNC: 217 MG/DL (ref 65–99)
GLUCOSE UR STRIP-MCNC: ABNORMAL MG/DL
HCT VFR BLD AUTO: 37.2 % (ref 37.5–51)
HGB BLD-MCNC: 12.1 G/DL (ref 13–17.7)
HGB UR QL STRIP.AUTO: NEGATIVE
KETONES UR QL STRIP: NEGATIVE
LEUKOCYTE ESTERASE UR QL STRIP.AUTO: NEGATIVE
LIPASE SERPL-CCNC: 45 U/L (ref 13–60)
LYMPHOCYTES # BLD AUTO: 0.6 10*3/MM3 (ref 0.7–3.1)
LYMPHOCYTES NFR BLD AUTO: 8.5 % (ref 19.6–45.3)
MCH RBC QN AUTO: 30.6 PG (ref 26.6–33)
MCHC RBC AUTO-ENTMCNC: 32.5 G/DL (ref 31.5–35.7)
MCV RBC AUTO: 93.9 FL (ref 79–97)
MONOCYTES # BLD AUTO: 0.3 10*3/MM3 (ref 0.1–0.9)
MONOCYTES NFR BLD AUTO: 4.2 % (ref 5–12)
NEUTROPHILS NFR BLD AUTO: 6.03 10*3/MM3 (ref 1.7–7)
NEUTROPHILS NFR BLD AUTO: 85.4 % (ref 42.7–76)
NITRITE UR QL STRIP: NEGATIVE
PH UR STRIP.AUTO: 8 [PH] (ref 5–8)
PLATELET # BLD AUTO: 203 10*3/MM3 (ref 140–450)
PMV BLD AUTO: 10.9 FL (ref 6–12)
POTASSIUM SERPL-SCNC: 4.6 MMOL/L (ref 3.5–5.2)
PROT SERPL-MCNC: 7.3 G/DL (ref 6–8.5)
PROT UR QL STRIP: ABNORMAL
RBC # BLD AUTO: 3.96 10*6/MM3 (ref 4.14–5.8)
SODIUM SERPL-SCNC: 138 MMOL/L (ref 136–145)
SP GR UR STRIP: 1.01 (ref 1–1.03)
UROBILINOGEN UR QL STRIP: ABNORMAL
WBC # BLD AUTO: 7.07 10*3/MM3 (ref 3.4–10.8)

## 2021-01-09 PROCEDURE — 81003 URINALYSIS AUTO W/O SCOPE: CPT | Performed by: EMERGENCY MEDICINE

## 2021-01-09 PROCEDURE — 85025 COMPLETE CBC W/AUTO DIFF WBC: CPT | Performed by: EMERGENCY MEDICINE

## 2021-01-09 PROCEDURE — 83690 ASSAY OF LIPASE: CPT | Performed by: EMERGENCY MEDICINE

## 2021-01-09 PROCEDURE — 25010000002 ONDANSETRON PER 1 MG: Performed by: EMERGENCY MEDICINE

## 2021-01-09 PROCEDURE — 71045 X-RAY EXAM CHEST 1 VIEW: CPT

## 2021-01-09 PROCEDURE — 96374 THER/PROPH/DIAG INJ IV PUSH: CPT

## 2021-01-09 PROCEDURE — P9612 CATHETERIZE FOR URINE SPEC: HCPCS

## 2021-01-09 PROCEDURE — 96375 TX/PRO/DX INJ NEW DRUG ADDON: CPT

## 2021-01-09 PROCEDURE — 99284 EMERGENCY DEPT VISIT MOD MDM: CPT

## 2021-01-09 PROCEDURE — 93005 ELECTROCARDIOGRAM TRACING: CPT | Performed by: EMERGENCY MEDICINE

## 2021-01-09 PROCEDURE — 93010 ELECTROCARDIOGRAM REPORT: CPT | Performed by: INTERNAL MEDICINE

## 2021-01-09 PROCEDURE — 80053 COMPREHEN METABOLIC PANEL: CPT | Performed by: EMERGENCY MEDICINE

## 2021-01-09 RX ORDER — LABETALOL HYDROCHLORIDE 5 MG/ML
20 INJECTION, SOLUTION INTRAVENOUS ONCE
Status: COMPLETED | OUTPATIENT
Start: 2021-01-09 | End: 2021-01-09

## 2021-01-09 RX ORDER — ONDANSETRON 2 MG/ML
4 INJECTION INTRAMUSCULAR; INTRAVENOUS ONCE
Status: COMPLETED | OUTPATIENT
Start: 2021-01-09 | End: 2021-01-09

## 2021-01-09 RX ORDER — SODIUM CHLORIDE 0.9 % (FLUSH) 0.9 %
10 SYRINGE (ML) INJECTION AS NEEDED
Status: DISCONTINUED | OUTPATIENT
Start: 2021-01-09 | End: 2021-01-09 | Stop reason: HOSPADM

## 2021-01-09 RX ADMIN — ONDANSETRON 4 MG: 2 INJECTION INTRAMUSCULAR; INTRAVENOUS at 15:11

## 2021-01-09 RX ADMIN — LABETALOL HYDROCHLORIDE 20 MG: 5 INJECTION, SOLUTION INTRAVENOUS at 19:25

## 2021-01-09 NOTE — ED PROVIDER NOTES
EMERGENCY DEPARTMENT ENCOUNTER  Patient was placed in face mask in first look and the following protective measures were taken unless additional measures were taken and documented below in the ED course. Patient was wearing facemask when I entered the room and throughout our encounter. I wore full protective equipment throughout this patient encounter including a face mask, and gloves. Hand hygiene was performed before donning protective equipment and after removal when leaving the room.    Room Number:  40/40  Date of encounter:  1/9/2021  PCP: Dakota Avila MD    HPI:  Context: Dilip Verma is a 70 y.o. male who presents to the ED c/o chief complaint of decreased urine output for the past 2 days with urinary dribbling and lower abdominal pain.  He has had nausea and vomiting secondary to discomfort and his lower abdomen.  He states he was here several days ago and was told that he had urinary retention but he did not want a Hopkins catheter at that time.  His lower abdominal pain continue to increase with the increase sensation of needing to defecate.  He feels like he needs to urinate but has only been able to urinate in small amounts.  Today, he changed his mind and would like one placed if he is retaining.  He denies fever, chills, cough or shortness of air.    MEDICAL HISTORY REVIEW  Reviewed in [facility].  Patient was seen here on January 3, 2021 for symptoms of constipation.  He had an enema with minimal relief but his KUB showed no evidence of constipation or fecal impaction.  However, he did have signs of urinary retention but he did not want a Hopkins catheter placed.    PAST MEDICAL HISTORY  Active Ambulatory Problems     Diagnosis Date Noted   • Anxiety    • Colon polyp    • Diabetes mellitus, type II (CMS/[facility])    • Erectile dysfunction    • Hyperlipidemia    • CAD (coronary artery disease) 07/20/2019   • Hx of CABG 08/19/2019   • Chronic diastolic CHF (congestive heart failure) (CMS/[facility]) 09/11/2019   •  Hypersomnia due to medical condition 09/14/2019   • CSA (central sleep apnea) 09/14/2019   • Periodic breathing 09/14/2019   • HTN (hypertension) 02/26/2020   • Hypoxia 02/26/2020   • Closed nondisplaced intertrochanteric fracture of left femur (CMS/[facility]) 12/01/2020   • History of stroke 12/01/2020   • CKD (chronic kidney disease) stage 3, GFR 30-59 ml/min 12/01/2020   • Urinary retention 12/02/2020   • AMENA (acute kidney injury) (CMS/HCC) 12/03/2020   • Acute posthemorrhagic anemia 12/04/2020   • Complicated UTI (urinary tract infection) 12/16/2020     Resolved Ambulatory Problems     Diagnosis Date Noted   • NSTEMI (non-ST elevated myocardial infarction) (CMS/HCC) 07/12/2019   • Orthostasis 07/20/2019     Past Medical History:   Diagnosis Date   • Chronic diastolic (congestive) heart failure (CMS/HCC)    • Chronic kidney disease    • Coronary artery disease    • H/O bone density study never   • Hypersomnia    • Hypertension    • Kidney stone    • Routine eye exam 2015   • Sleep apnea    • Stroke (CMS/HCC)        PAST SURGICAL HISTORY  Past Surgical History:   Procedure Laterality Date   • CARDIAC CATHETERIZATION N/A 7/15/2019    Procedure: Coronary angiography;  Surgeon: Carrie Price MD;  Location: [facility] CATH INVASIVE LOCATION;  Service: Cardiovascular   • CARDIAC CATHETERIZATION N/A 7/15/2019    Procedure: Left Heart Cath;  Surgeon: Carrie Price MD;  Location: [facility] CATH INVASIVE LOCATION;  Service: Cardiovascular   • CARDIAC CATHETERIZATION N/A 7/15/2019    Procedure: Left ventriculography;  Surgeon: Carrie Price MD;  Location: [facility] CATH INVASIVE LOCATION;  Service: Cardiovascular   • CARDIAC CATHETERIZATION  7/15/2019    Procedure: Functional Flow [place];  Surgeon: Carrie Price MD;  Location: [facility] CATH INVASIVE LOCATION;  Service: Cardiovascular   • CARDIAC CATHETERIZATION N/A 11/6/2019    Procedure: Right and Left Heart Cath;  Surgeon: Mya Smith MD;  Location: [facility] CATH INVASIVE  LOCATION;  Service: Cardiovascular   • CARDIAC CATHETERIZATION N/A 2019    Procedure: Coronary angiography;  Surgeon: Mya Smith MD;  Location: [facility] CATH INVASIVE LOCATION;  Service: Cardiovascular   • CATARACT EXTRACTION EXTRACAPSULAR W/ INTRAOCULAR LENS IMPLANTATION Bilateral    • COLONOSCOPY  2015    dr [email or web address]   • CORONARY ARTERY BYPASS GRAFT N/A 2019    Procedure: INTRAOPERATIVE SHOAIB; STERNOTOMY CORONARY ARTERY BYPASS x 3  USING LEFT INTERNAL MAMMARY ARTERY GRAFT UTILIZING ENDOSCOPICALLY HARVESTED RIGHT GREATER SAPHENOUS VEIN AND PRP.;  Surgeon: Bill Devi MD;  Location: [facility] MAIN OR;  Service: Cardiothoracic   • DENTAL PROCEDURE      3 surgeries inder implants   • FEMUR IM NAILING/RODDING Left 12/3/2020    Procedure: LEFT HIP INTRAMEDULLARY NAIL;  Surgeon: Niraj Ravi MD;  Location: [facility] OR;  Service: Orthopedic Spine;  Laterality: Left;   • HERNIA REPAIR      inguinal bilaterally   • KIDNEY STONE SURGERY      lithotripsy       FAMILY HISTORY  Family History   Problem Relation Age of Onset   • Depression Mother    • Cancer Mother         uterine   • Arrhythmia Mother    • Heart disease Father    • Hypertension Father    • Kidney disease Father    • Thyroid disease Father    • Depression Sister    • Alcohol abuse Brother    • Alcohol abuse Other    • Alcohol abuse Other    • Lung disease Other    • Thyroid disease Other    • Glaucoma Other    • Heart attack Other    • Stroke Maternal Grandmother    • Stroke Paternal Grandmother        SOCIAL HISTORY  Social History     Socioeconomic History   • Marital status:      Spouse name: Not on file   • Number of children: Not on file   • Years of education: Not on file   • Highest education level: Not on file   Tobacco Use   • Smoking status: Former Smoker     Packs/day: 0.75     Years: 16.00     Pack years: 12.00     Quit date:      Years since quittin.0   • Smokeless tobacco: Never Used   • Tobacco comment: Start  age:39/Stopping age:47, 3/4 PPD   Substance and Sexual Activity   • Alcohol use: No     Comment: caffeine use - 1 cup daily   • Drug use: No   • Sexual activity: Defer       ALLERGIES  Ace inhibitors, Clonidine derivatives, Losartan, Amlodipine, Ativan [lorazepam], Xanax [alprazolam], Minoxidil, and Tetracycline    The patient's allergies have been reviewed    REVIEW OF SYSTEMS  All systems reviewed and negative except for those discussed in HPI.     PHYSICAL EXAM  I have reviewed the triage vital signs and nursing notes.  ED Triage Vitals [01/09/21 1354]   Temp Heart Rate Resp BP SpO2   96.4 °F (35.8 °C) 72 18 170/88 97 %      Temp src Heart Rate Source Patient Position BP Location FiO2 (%)   -- -- -- -- --       General: Mild distress  HENT: NCAT, PERRL, Nares patent  Eyes: no scleral icterus  Neck: trachea midline, no ROM limitations  CV: regular rhythm, regular rate  Respiratory: normal effort, rales in left base  Abdomen: Normoactive bowel sounds, soft, mild suprapubic tenderness with bladder distention.  : Circumcised.  Patient has overflow incontinence with dribbling of urine in the meatus.  Musculoskeletal: no deformity  Neuro: alert, moves all extremities, follows commands  Skin: warm, dry    LAB RESULTS  Recent Results (from the past 24 hour(s))   Comprehensive Metabolic Panel    Collection Time: 01/09/21  2:36 PM    Specimen: Blood   Result Value Ref Range    Glucose 217 (H) 65 - 99 mg/dL    BUN 12 8 - 23 mg/dL    Creatinine 0.96 0.76 - 1.27 mg/dL    Sodium 138 136 - 145 mmol/L    Potassium 4.6 3.5 - 5.2 mmol/L    Chloride 97 (L) 98 - 107 mmol/L    CO2 29.3 (H) 22.0 - 29.0 mmol/L    Calcium 9.0 8.6 - 10.5 mg/dL    Total Protein 7.3 6.0 - 8.5 g/dL    Albumin 3.60 3.50 - 5.20 g/dL    ALT (SGPT) 38 1 - 41 U/L    AST (SGOT) 42 (H) 1 - 40 U/L    Alkaline Phosphatase 250 (H) 39 - 117 U/L    Total Bilirubin 1.6 (H) 0.0 - 1.2 mg/dL    eGFR Non African Amer 77 >60 mL/min/1.73    Globulin 3.7 gm/dL    A/G Ratio  1.0 g/dL    BUN/Creatinine Ratio 12.5 7.0 - 25.0    Anion Gap 11.7 5.0 - 15.0 mmol/L   CBC Auto Differential    Collection Time: 01/09/21  2:36 PM    Specimen: Blood   Result Value Ref Range    WBC 7.07 3.40 - 10.80 10*3/mm3    RBC 3.96 (L) 4.14 - 5.80 10*6/mm3    Hemoglobin 12.1 (L) 13.0 - 17.7 g/dL    Hematocrit 37.2 (L) 37.5 - 51.0 %    MCV 93.9 79.0 - 97.0 fL    MCH 30.6 26.6 - 33.0 pg    MCHC 32.5 31.5 - 35.7 g/dL    RDW 13.8 12.3 - 15.4 %    RDW-SD 46.8 37.0 - 54.0 fl    MPV 10.9 6.0 - 12.0 fL    Platelets 203 140 - 450 10*3/mm3    Neutrophil % 85.4 (H) 42.7 - 76.0 %    Lymphocyte % 8.5 (L) 19.6 - 45.3 %    Monocyte % 4.2 (L) 5.0 - 12.0 %    Eosinophil % 1.1 0.3 - 6.2 %    Basophil % 0.4 0.0 - 1.5 %    Neutrophils, Absolute 6.03 1.70 - 7.00 10*3/mm3    Lymphocytes, Absolute 0.60 (L) 0.70 - 3.10 10*3/mm3    Monocytes, Absolute 0.30 0.10 - 0.90 10*3/mm3    Eosinophils, Absolute 0.08 0.00 - 0.40 10*3/mm3    Basophils, Absolute 0.03 0.00 - 0.20 10*3/mm3   Lipase    Collection Time: 01/09/21  2:36 PM    Specimen: Blood   Result Value Ref Range    Lipase 45 13 - 60 U/L   ECG 12 Lead    Collection Time: 01/09/21  2:48 PM   Result Value Ref Range    QT Interval 401 ms   Urinalysis With Microscopic If Indicated (No Culture) - Urine, Catheter    Collection Time: 01/09/21  3:12 PM    Specimen: Urine, Catheter   Result Value Ref Range    Color, UA Yellow Yellow, Straw    Appearance, UA Clear Clear    pH, UA 8.0 5.0 - 8.0    Specific Gravity, UA 1.012 1.005 - 1.030    Glucose,  mg/dL (1+) (A) Negative    Ketones, UA Negative Negative    Bilirubin, UA Negative Negative    Blood, UA Negative Negative    Protein, UA Trace (A) Negative    Leuk Esterase, UA Negative Negative    Nitrite, UA Negative Negative    Urobilinogen, UA 1.0 E.U./dL 0.2 - 1.0 E.U./dL       I ordered the above labs and reviewed the results.    RADIOLOGY  Xr Chest 1 View    Result Date: 1/9/2021  ONE VIEW PORTABLE CHEST  HISTORY: Shortness of breath.  Constipation. Recent left hip fracture.  FINDINGS:  The lungs are moderately expanded with some bandlike atelectasis in the lower left lung similar to the study of 01/03/2021. The heart remains mildly enlarged with sternal wires from previous cardiac surgery.  This report was finalized on 1/9/2021 4:22 PM by Dr. Jesus Alberto Walters M.D.        I ordered the above noted radiological studies. I reviewed the images and results. I agree with the radiologist interpretation.    PROCEDURES  Procedures    MEDICATIONS GIVEN IN ER  Medications   sodium chloride 0.9 % flush 10 mL (has no administration in time range)   ondansetron (ZOFRAN) injection 4 mg (4 mg Intravenous Given 1/9/21 1511)       PROGRESS, DATA ANALYSIS, CONSULTS, AND MEDICAL DECISION MAKING  A complete history and physical exam have been performed.  All available laboratory and imaging results have been reviewed by myself prior to disposition.    MDM  After the initial H&P, I discussed pertinent information from history and physical exam with patient/family.  Discussed differential diagnosis.  Discussed plan for ED evaluation/work-up/treatment.  All questions answered.  Patient/family is agreeable with plan.  ED Course as of Jan 09 1635   Sat Jan 09, 2021   1417 Patient's bladder scan shows 634 cc.  We will place a Hopkins catheter for his acute urinary retention.  His oxygen saturations decreased to 83% when he is laying flat.  We will obtain a chest x-ray to rule out congestive heart failure.  Patient denies shortness of air.    [DE]   1443 Hopkins catheter was placed and approximately 700 cc of urine was drained from his bladder.  He states he is feeling nauseated.  We will give Zofran.    [DE]   1451 EKG          EKG time: 1448  Rhythm/Rate: Normal sinus rhythm, rate 73  P waves and ME: Normal  QRS, axis: Right bundle branch block  ST and T waves: Diffuse T wave abnormalities    Interpreted Contemporaneously by me, independently viewed  unchanged compared to prior  12/16/20      [DE]   1509 Patient states he is feeling much better after receiving the Hopkins catheter.  His nausea is abating.  States that he has chronic respiratory failure and is on 2 L nasal cannula, 24 hours a day.    [DE]   1557 Called hematology.  Patient's WBC is 6.8, hemoglobin 11.5, hematocrit 35.  The platelets are clumped but she is estimating 192,000.    [DE]   1559 Patient's chest x-ray shows bandlike atelectasis.  No acute disease.    [DE]   1614 Platelets: 203 [DE]      ED Course User Index  [DE] Hardy Salgado MD       AS OF 16:35 EST VITALS:    BP - 170/88  HR - 72  TEMP - 96.4 °F (35.8 °C)  O2 SATS - 94%    DIAGNOSIS  Final diagnoses:   Acute urinary retention   Lower abdominal pain   Hypoxia         DISPOSITION    DISCHARGE    Patient discharged in stable condition.    Reviewed implications of results, diagnosis, meds, responsibility to follow up, warning signs and symptoms of possible worsening, potential complications and reasons to return to ER.    Patient/Family voiced understanding of above instructions.    Discussed plan for discharge, as there is no emergent indication for admission. Patient referred to primary care provider for BP management due to today's BP. Pt/family is agreeable and understands need for follow up and repeat testing.  Pt is aware that discharge does not mean that nothing is wrong but it indicates no emergency is present that requires admission and they must continue care with follow-up as given below or physician of their choice.     FOLLOW-UP  Dakota Avila MD  [address] [facility] [id number] [phone]    Schedule an appointment as soon as possible for a visit       Lasha Velasco MD  [address] [facility] [id number] [phone]    Schedule an appointment as soon as possible for a visit            Medication List      No changes were made to your prescriptions during this visit.          Hardy Salgado MD  01/09/21 6197

## 2021-01-09 NOTE — ED NOTES
Pt to ED from home via [facility] ems d/t constipation and abdominal pain. Pt was seen in the ED last week for the same symptoms. Pt is taking narcotics for a broken hip from a few weeks ago.     Pt placed in mask during triage. This RN wearing proper PPE, mask and glasses, during pt encounter. Hand hygiene performed before and after pt encounter.        Vannesa Garcia, RN  01/09/21 1682

## 2021-01-09 NOTE — DISCHARGE INSTRUCTIONS
We will need to keep the Hopkins catheter in place for at least the next 3 days.  Follow-up with either your doctor or our urologist, Dr. Velasco.  Call Monday for an appointment.  Please return to the emergency department if symptoms worsen.

## 2021-01-09 NOTE — PROGRESS NOTES
Per request from RN, spoke w/ answering service w/ [facility] to arrange transport home. They will call back w/ ETA. RN updated

## 2021-01-10 LAB — QT INTERVAL: 401 MS

## 2021-01-11 ENCOUNTER — TELEPHONE (OUTPATIENT)
Dept: FAMILY MEDICINE CLINIC | Facility: CLINIC | Age: 71
End: 2021-01-11

## 2021-01-11 ENCOUNTER — TELEMEDICINE (OUTPATIENT)
Dept: FAMILY MEDICINE CLINIC | Facility: CLINIC | Age: 71
End: 2021-01-11

## 2021-01-11 ENCOUNTER — PATIENT OUTREACH (OUTPATIENT)
Dept: CASE MANAGEMENT | Facility: OTHER | Age: 71
End: 2021-01-11

## 2021-01-11 DIAGNOSIS — Z95.1 HX OF CABG: ICD-10-CM

## 2021-01-11 DIAGNOSIS — I25.10 CORONARY ARTERY DISEASE INVOLVING NATIVE CORONARY ARTERY OF NATIVE HEART WITHOUT ANGINA PECTORIS: ICD-10-CM

## 2021-01-11 DIAGNOSIS — R33.9 URINARY RETENTION: ICD-10-CM

## 2021-01-11 DIAGNOSIS — R09.02 HYPOXIA: ICD-10-CM

## 2021-01-11 DIAGNOSIS — G47.00 INSOMNIA, UNSPECIFIED TYPE: ICD-10-CM

## 2021-01-11 DIAGNOSIS — I10 ESSENTIAL HYPERTENSION: Primary | ICD-10-CM

## 2021-01-11 DIAGNOSIS — K59.00 CONSTIPATION, UNSPECIFIED CONSTIPATION TYPE: ICD-10-CM

## 2021-01-11 PROCEDURE — 99214 OFFICE O/P EST MOD 30 MIN: CPT | Performed by: FAMILY MEDICINE

## 2021-01-11 NOTE — OUTREACH NOTE
Patient Outreach Note  Spoke with patient's wife, and discussed patient's physical decline since sustaining a  fall in December with rehospitalization for Sepsis.  Current with [facility] for SN, PT, and OT.  Ms. Verma has been instructed on picc line care and was administering IV antibiotics for patient's diagnosis of sepsis.  Voiced understanding of FC care and emptying light bag after watching a you tube video.  Has a 5PM telehealth visit with PCP, Dr. Avila today.  Encouraged to request a leg bag from PCP office or home health agency.  Instructed on bowel program and administering senna and Miralax daily as prescribed for constipation. Educated to increase water intake.  Advised to schedule appt with Dr. Velasco per ED instructions. For follow-up.  States patient continues with NWB status and bedridden.  Has an appt with ortho on Friday for follow-up related to hip surgery as treatment for injury he sustained in the fall.  Our conversation was disconnected in the midst of enrollment with [facility].  Unsuccessful attempt upon returning call to Ms. Verma.     Johan Dillard RN  Ambulatory     1/11/2021, 14:55 EST

## 2021-01-12 ENCOUNTER — PATIENT OUTREACH (OUTPATIENT)
Dept: CASE MANAGEMENT | Facility: OTHER | Age: 71
End: 2021-01-12

## 2021-01-12 ENCOUNTER — TELEPHONE (OUTPATIENT)
Dept: FAMILY MEDICINE CLINIC | Facility: CLINIC | Age: 71
End: 2021-01-12

## 2021-01-12 NOTE — PROGRESS NOTES
SUBJECTIVE:  The patient is a 70-year-old male.  Because of Covid pandemic and the patient's lack of mobility a telehealth audiovisual visit is scheduled.  Permission/consent given.  The patient has multiple medical problems.  He has a recently fractured hip.  He developed urinary retention and was seen in the emergency room on January 9.  At that time a Hopkins catheter was inserted.  He obtained relief.  His blood pressure was uncontrolled on that visit.  I have reviewed the ER visit.  Today he complains of insomnia constipation anxiety shortness of breath and immobility.  He states his bowels have not moved in about a week.  He is having trouble sleeping.  His blood pressure is running in the 140s and 150s systolic over 80s diastolic.  He denies chest pain.  He is requiring O2 at 2 L in the evening.  He has an appointment with urology and orthopedic surgery in the near future.    PAST MEDICAL HISTORY:  Reviewed.    REVIEW OF SYSTEMS:  Please see above.  All others reviewed and are negative.      OBJECTIVE:   There were no vitals taken for this visit.      General:  Well-nourished.  Alert and oriented x3 in no acute distress.  He is resting in his bed.  HEENT: GIA.       ASSESSMENT:      Diagnoses and all orders for this visit:    1. Essential hypertension (Primary)    2. Constipation, unspecified constipation type    3. Urinary retention    4. Hypoxia    5. Insomnia, unspecified type    6. Coronary artery disease involving native coronary artery of native heart without angina pectoris    7. Hx of CABG         PLAN: He will closely monitor his blood pressure.  He will let me know what is running in a couple days.  He has tried senna for a stool softener.  He has tried MiraLAX just today.  I advised him should he have no bowel movement in the next day or so to try a fleets enema.  He can also attempt Dulcolax suppository.  Should he develop any worsening of symptoms or abdominal pain he is advised to go to  emergency room.  He will continue melatonin for his insomnia.  He will keep his appointment with his orthopedic surgeon and neurologist.  30 minutes was spent on this telehealth visit with the patient and his wife.    Dictated utilizing Dragon dictation.    Dictated utilizing Dragon dictation.

## 2021-01-12 NOTE — TELEPHONE ENCOUNTER
JUST AN FYI:   PATIENT JUST WANTED YOU TO KNOW THAT THEY HAVE ESTABLISHED CARE FROM DR PRATER TO DR. RAUL TRAN.

## 2021-01-12 NOTE — OUTREACH NOTE
Care Plan Note      Responses   Care Gaps Addressed  Pneumonia Vaccine, Flu Shot   Care Gap Comments  Does not take flu shot or vaccines   Other Patient Education/Resources   Advanced Care Planning, MyChart, Home Healthcare, Virtual Visit, Transportation   ACP Education Method  Send Materials   Home Healthcare Education Method  Verbal   MyChart Education Method  Verbal   Transportation Education Method  Verbal   Virtual Visit Education Method  Verbal   Does patient have depression diagnosis?  No   Advanced Directives:  Send Materials   Ed Visits past 12 months:  2 or 3   Hospitalizations past 12 months  3 or more        The main concerns and/or symptoms the patient would like to address are: Scheduling appt with ortho, Dr. Ravi and transportation due to NWB    Education/instruction provided by Care Coordinator: Plans to contact Dr. Ravi's office today regarding appt and their preference if patient should arrive on stretcher for xray versus wheelchair transport and assist with transfer to xray table. Resources provided for transportation.  Advised to contact her customer service [facility] insurance company and request enrollment with  and determine coverage and benefits, preferred providers for outpatient transportation needs. She has contacted the ED and plans to  a leg bag as suggested by ACM yesterday.  Had a bowel movement last evening after a suppository and reinforced yesterday's discussion on establishing a bowel program with the senna and Miralax as prescribed.  Receiving meals from Mom's meals for four weeks per insurance benefits.  Adherent to pandemic guidelines.  Patient huerta not take the flu shot due to adverse reaction.  Has had DNA testing on multiple medications that have caused adverse reactions.  Has not taken a pneumonia vaccine. Establishing care with Dr. Avila for new primary care provider. ACM contact information provided.      Follow Up Outreach Due: 2-4 weeks    Johan  LUC Dillard  Ambulatory     1/12/2021, 11:51 EST

## 2021-01-13 ENCOUNTER — TELEPHONE (OUTPATIENT)
Dept: FAMILY MEDICINE CLINIC | Facility: CLINIC | Age: 71
End: 2021-01-13

## 2021-01-13 NOTE — TELEPHONE ENCOUNTER
PATIENT'S WIFE CALLED IN TO UPDATE DR. TRAN ABOUT THE PATIENT'S CONDITION. SHE STATED THAT THE SUPPOSITORY WORKED WITHIN 30 MINS AND WAS VERY HARD. THE PATIENT STILL HAS A URINE CATHETER AND HAS AN APPOINTMENT SCHEDULED ON 01/15/21 FOR A FOLLOW UP ON THE SURGEON TO SEE IF THE PATIENT CAN START BEARING WEIGHT. SHE WOULD LIKE TO KNOW HOW DR. TRAN FEELS ABOUT GETTING THE CATHETER REMOVED, SHE AND THE PATIENT BOTH FEEL LIKE THE ISSUE HAS BEEN RESOLVED.    THE PATIENT WOULD ALSO LIKE TO SPEAK TO DR. TRAN ABOUT TRANSPORTATION METHODS, AS THE COMPANY THEY CURRENTLY USE DOES NOT ASSIST WITH GETTING THE PATIENT DOWN STEPS.     CALL BACK IF NEEDED    [phone]

## 2021-01-13 NOTE — TELEPHONE ENCOUNTER
The bowel movement is good news.  The catheter will have to be decided upon by the urologist.  I am not sure who to speak to about assisting the patient in getting down steps.  I will try to think of someone we can call him but at the present time if there are other companies available I would call them

## 2021-01-14 ENCOUNTER — READMISSION MANAGEMENT (OUTPATIENT)
Dept: CALL CENTER | Facility: HOSPITAL | Age: 71
End: 2021-01-14

## 2021-01-14 NOTE — OUTREACH NOTE
Sepsis Week 4 Survey      Responses   [facility] patient discharged from?  [facility]   Does the patient have one of the following disease processes/diagnoses(primary or secondary)?  Sepsis   Week 4 attempt successful?  Yes   Call start time  1735   Call end time  1743   Is patient permission given to speak with other caregiver?  Yes   Person spoke with today (if not patient) and relationship  wife   Meds reviewed with patient/caregiver?  Yes   Is the patient having any side effects they believe may be caused by any medication additions or changes?  No   Is the patient taking all medications as directed (includes completed medication regime)?  Yes   Has the patient kept scheduled appointments due by today?  N/A   Is the patient still receiving Home Health Services?  N/A   Psychosocial issues?  No   What is the patient's perception of their health status since discharge?  Improving   Nursing interventions  Nurse provided patient education   Is the patient/caregiver able to teach back Sepsis?  S - Shivering,fever or very cold, E - Extreme pain or generalized discomfort (worst ever,especially abdomen), P - Pale or discolored skin, S - Sleepy, difficult to arouse,confused, I -   I feel like I might die-a feeling of hopelessness, S - Short of breath   Nursing interventions  Nurse provided reassurance to patient   Is patient/caregiver able to teach back steps to recovery at home?  Set small, achievable goals for return to baseline health, Record milestones and struggles in a journal, Exercise as tolerated, Make a list of questions for PCP appoinment, Learn about sepsis(sepsis.org), Rest and regain strength, Talk about feelings with family/friends, Eat a balanced diet   Is the patient/caregiver able to teach back signs and symptoms of worsening condition:  Fever, Altered mental status(confusion/coma), Edema, Hyperthermia, Rapid heart rate (>90), High blood glucose without diabetes, Shortness of breath/rapid  respiratory rate   If the patient is a current smoker, are they able to teach back resources for cessation?  Not a smoker   Is the patient/caregiver able to teach back the hierarchy of who to call/visit for symptoms/problems? PCP, Specialist, Home health nurse, Urgent Care, ED, 911  Yes   Week 4 call completed?  Yes          Niraj Sosa RN

## 2021-01-19 ENCOUNTER — LAB REQUISITION (OUTPATIENT)
Dept: LAB | Facility: HOSPITAL | Age: 71
End: 2021-01-19

## 2021-01-19 DIAGNOSIS — N39.0 URINARY TRACT INFECTION, SITE NOT SPECIFIED: ICD-10-CM

## 2021-01-19 DIAGNOSIS — B96.20 UNSPECIFIED ESCHERICHIA COLI (E. COLI) AS THE CAUSE OF DISEASES CLASSIFIED ELSEWHERE: ICD-10-CM

## 2021-01-19 LAB
ANION GAP SERPL CALCULATED.3IONS-SCNC: 12 MMOL/L (ref 5–15)
BUN SERPL-MCNC: 14 MG/DL (ref 8–23)
BUN/CREAT SERPL: 14.4 (ref 7–25)
CALCIUM SPEC-SCNC: 9.1 MG/DL (ref 8.6–10.5)
CHLORIDE SERPL-SCNC: 99 MMOL/L (ref 98–107)
CO2 SERPL-SCNC: 31 MMOL/L (ref 22–29)
CREAT SERPL-MCNC: 0.97 MG/DL (ref 0.76–1.27)
GFR SERPL CREATININE-BSD FRML MDRD: 76 ML/MIN/1.73
GLUCOSE SERPL-MCNC: 124 MG/DL (ref 65–99)
POTASSIUM SERPL-SCNC: 3.6 MMOL/L (ref 3.5–5.2)
SODIUM SERPL-SCNC: 142 MMOL/L (ref 136–145)

## 2021-01-19 PROCEDURE — 80048 BASIC METABOLIC PNL TOTAL CA: CPT | Performed by: INTERNAL MEDICINE

## 2021-02-04 ENCOUNTER — PATIENT OUTREACH (OUTPATIENT)
Dept: CASE MANAGEMENT | Facility: OTHER | Age: 71
End: 2021-02-04

## 2021-02-04 NOTE — OUTREACH NOTE
"Patient Outreach Note : Spoke with patient's spouse and reports patient has progressed  to weight bearing status as tolerated. Continues with home physical and occupational therapies twice weekly and adheres to daily exercise regimen.  Neighbor has built a wheelchair ramp which has been a tremendous assistance for outgoing appts. Appt  this Monday, , with urologist and hopeful that catheter will be removed.  Reports a recent episode  of elevated blood sugar but attributes it to the Humalog was \".\"  Discussed at length recent challenges in assisting with his ADL's and lack of sleep due to patient's restlessness.  Denies additional support from family members but neighbor very supportive along swith home health services and delivery of Mom's meals.  Deferred discussion related to care gaps: DM ey and foot exam and AWV due unitl patient becomes more mobile and comfortable with transfers.  No further issues or concerns voiced.     Johan Dillard RN  Ambulatory     2021, 15:51 EST      "

## 2021-02-11 ENCOUNTER — TELEPHONE (OUTPATIENT)
Dept: FAMILY MEDICINE CLINIC | Facility: CLINIC | Age: 71
End: 2021-02-11

## 2021-02-11 NOTE — TELEPHONE ENCOUNTER
WIFE CALLED FROM PATIENT AND INQUIRED HOW CAN PATIENT BE WEENED FROM THE OXYGEN OR SHOULD HE BE ON IT NOW?  PATIENT RECOVERING FROM HIP SURGERY 12/1/20.  PLEASE ADVISE    CALL BACK #: [phone]

## 2021-02-11 NOTE — TELEPHONE ENCOUNTER
PATIENT WIFE STATED THAT PATIENT IS NOT SLEEPING DURING THE NIGHT.  HE WAS PLACED ON SLEEPING MEDICINE BUT THAT MEDICATION WAS STOPPED.  PATIENT IS HAVING BACK PROBLEMS THAT INTERRUPT HIS SLEEP.  PLEASE ADVISE    CALL BACK #:[phone]

## 2021-02-17 ENCOUNTER — OFFICE VISIT (OUTPATIENT)
Dept: FAMILY MEDICINE CLINIC | Facility: CLINIC | Age: 71
End: 2021-02-17

## 2021-02-17 VITALS
SYSTOLIC BLOOD PRESSURE: 128 MMHG | HEART RATE: 81 BPM | DIASTOLIC BLOOD PRESSURE: 60 MMHG | RESPIRATION RATE: 20 BRPM | BODY MASS INDEX: 30.66 KG/M2 | HEIGHT: 69 IN | WEIGHT: 207 LBS | OXYGEN SATURATION: 92 %

## 2021-02-17 DIAGNOSIS — R09.02 HYPOXEMIA: Primary | ICD-10-CM

## 2021-02-17 DIAGNOSIS — N39.0 URINARY TRACT INFECTION WITH HEMATURIA, SITE UNSPECIFIED: ICD-10-CM

## 2021-02-17 DIAGNOSIS — R39.89 ABNORMAL URINE COLOR: ICD-10-CM

## 2021-02-17 DIAGNOSIS — M85.872 OTHER SPECIFIED DISORDERS OF BONE DENSITY AND STRUCTURE, LEFT ANKLE AND FOOT: ICD-10-CM

## 2021-02-17 DIAGNOSIS — I10 ESSENTIAL HYPERTENSION: ICD-10-CM

## 2021-02-17 DIAGNOSIS — M85.80 OSTEOPENIA, UNSPECIFIED LOCATION: ICD-10-CM

## 2021-02-17 DIAGNOSIS — M25.572 LEFT ANKLE PAIN, UNSPECIFIED CHRONICITY: ICD-10-CM

## 2021-02-17 DIAGNOSIS — Z99.81 OXYGEN DEPENDENT: ICD-10-CM

## 2021-02-17 DIAGNOSIS — G47.00 INSOMNIA, UNSPECIFIED TYPE: ICD-10-CM

## 2021-02-17 DIAGNOSIS — R31.9 URINARY TRACT INFECTION WITH HEMATURIA, SITE UNSPECIFIED: ICD-10-CM

## 2021-02-17 LAB
ALBUMIN SERPL-MCNC: 3.7 G/DL (ref 3.5–5.2)
ALBUMIN/GLOB SERPL: 1.1 G/DL
ALP SERPL-CCNC: 310 U/L (ref 39–117)
ALT SERPL W P-5'-P-CCNC: 57 U/L (ref 1–41)
ANION GAP SERPL CALCULATED.3IONS-SCNC: 8.7 MMOL/L (ref 5–15)
AST SERPL-CCNC: 52 U/L (ref 1–40)
BACTERIA UR QL AUTO: ABNORMAL /HPF
BILIRUB SERPL-MCNC: 2.2 MG/DL (ref 0–1.2)
BILIRUB UR QL STRIP: NEGATIVE
BUN SERPL-MCNC: 16 MG/DL (ref 8–23)
BUN/CREAT SERPL: 14.7 (ref 7–25)
CALCIUM SPEC-SCNC: 9.7 MG/DL (ref 8.6–10.5)
CHLORIDE SERPL-SCNC: 99 MMOL/L (ref 98–107)
CLARITY UR: CLEAR
CO2 SERPL-SCNC: 30.3 MMOL/L (ref 22–29)
COLOR UR: YELLOW
CREAT SERPL-MCNC: 1.09 MG/DL (ref 0.76–1.27)
ERYTHROCYTE [DISTWIDTH] IN BLOOD BY AUTOMATED COUNT: 17.7 % (ref 12.3–15.4)
GFR SERPL CREATININE-BSD FRML MDRD: 67 ML/MIN/1.73
GLOBULIN UR ELPH-MCNC: 3.3 GM/DL
GLUCOSE SERPL-MCNC: 294 MG/DL (ref 65–99)
GLUCOSE UR STRIP-MCNC: ABNORMAL MG/DL
HCT VFR BLD AUTO: 36.7 % (ref 37.5–51)
HGB BLD-MCNC: 11.9 G/DL (ref 13–17.7)
HGB UR QL STRIP.AUTO: ABNORMAL
KETONES UR QL STRIP: NEGATIVE
LEUKOCYTE ESTERASE UR QL STRIP.AUTO: ABNORMAL
LYMPHOCYTES # BLD AUTO: 1 10*3/MM3 (ref 0.7–3.1)
LYMPHOCYTES NFR BLD AUTO: 18.4 % (ref 19.6–45.3)
MCH RBC QN AUTO: 30 PG (ref 26.6–33)
MCHC RBC AUTO-ENTMCNC: 32.4 G/DL (ref 31.5–35.7)
MCV RBC AUTO: 92.6 FL (ref 79–97)
MONOCYTES # BLD AUTO: 0.3 10*3/MM3 (ref 0.1–0.9)
MONOCYTES NFR BLD AUTO: 5.6 % (ref 5–12)
NEUTROPHILS NFR BLD AUTO: 4.1 10*3/MM3 (ref 1.7–7)
NEUTROPHILS NFR BLD AUTO: 76 % (ref 42.7–76)
NITRITE UR QL STRIP: POSITIVE
PH UR STRIP.AUTO: 6.5 [PH] (ref 4.6–8)
PLATELET # BLD AUTO: 225 10*3/MM3 (ref 140–450)
PMV BLD AUTO: 8.4 FL (ref 6–12)
POTASSIUM SERPL-SCNC: 4.5 MMOL/L (ref 3.5–5.2)
PROT SERPL-MCNC: 7 G/DL (ref 6–8.5)
PROT UR QL STRIP: ABNORMAL
RBC # BLD AUTO: 3.97 10*6/MM3 (ref 4.14–5.8)
RBC # UR: ABNORMAL /HPF
REF LAB TEST METHOD: ABNORMAL
SODIUM SERPL-SCNC: 138 MMOL/L (ref 136–145)
SP GR UR STRIP: 1.02 (ref 1–1.03)
SQUAMOUS #/AREA URNS HPF: ABNORMAL /HPF
TSH SERPL DL<=0.05 MIU/L-ACNC: 1.65 UIU/ML (ref 0.27–4.2)
UROBILINOGEN UR QL STRIP: ABNORMAL
WBC # BLD AUTO: 5.4 10*3/MM3 (ref 3.4–10.8)
WBC UR QL AUTO: ABNORMAL /HPF

## 2021-02-17 PROCEDURE — 36415 COLL VENOUS BLD VENIPUNCTURE: CPT | Performed by: FAMILY MEDICINE

## 2021-02-17 PROCEDURE — 87077 CULTURE AEROBIC IDENTIFY: CPT | Performed by: FAMILY MEDICINE

## 2021-02-17 PROCEDURE — 87086 URINE CULTURE/COLONY COUNT: CPT | Performed by: FAMILY MEDICINE

## 2021-02-17 PROCEDURE — 73610 X-RAY EXAM OF ANKLE: CPT | Performed by: FAMILY MEDICINE

## 2021-02-17 PROCEDURE — 99214 OFFICE O/P EST MOD 30 MIN: CPT | Performed by: FAMILY MEDICINE

## 2021-02-17 PROCEDURE — 81001 URINALYSIS AUTO W/SCOPE: CPT | Performed by: FAMILY MEDICINE

## 2021-02-17 PROCEDURE — 84443 ASSAY THYROID STIM HORMONE: CPT | Performed by: FAMILY MEDICINE

## 2021-02-17 PROCEDURE — 85025 COMPLETE CBC W/AUTO DIFF WBC: CPT | Performed by: FAMILY MEDICINE

## 2021-02-17 PROCEDURE — 87186 SC STD MICRODIL/AGAR DIL: CPT | Performed by: FAMILY MEDICINE

## 2021-02-17 PROCEDURE — 80053 COMPREHEN METABOLIC PANEL: CPT | Performed by: FAMILY MEDICINE

## 2021-02-17 RX ORDER — CIPROFLOXACIN 500 MG/1
500 TABLET, FILM COATED ORAL 2 TIMES DAILY
Qty: 20 TABLET | Refills: 0 | Status: SHIPPED | OUTPATIENT
Start: 2021-02-17 | End: 2021-04-07

## 2021-02-17 NOTE — PROGRESS NOTES
"SUBJECTIVE:  The patient is a 70-year-old white male.  He has multiple medical problems.  He presents today with left ankle pain.  He remembers hitting his foot on the side rails that he uses in his bed.  He states his urine has been dark-colored.  He is O2 dependent and his O2 sats are 88 off oxygen.  He has insomnia that is not improved with Tylenol PM and melatonin.  He has osteopenia and is due a DEXA scan    PAST MEDICAL HISTORY:  Reviewed.    REVIEW OF SYSTEMS:  Please see above.  All others reviewed and are negative.      OBJECTIVE:   /60 (BP Location: Left arm, Patient Position: Sitting)   Pulse 81   Resp 20   Ht 175.3 cm (69\")   Wt 93.9 kg (207 lb)   SpO2 92%   BMI 30.57 kg/m²    Vitals signs are reviewed and are stable.    General:  Well-nourished.  Alert and oriented x3 in no acute distress.  The patient is in a wheelchair and on oxygen.  HEENT: PERRLA.   Neck:  Supple.   Lungs:  Clear.  Breath sounds present bilaterally  Heart:  Regular rate and rhythm.   Abdomen:   Soft, nontender.   Extremities: Tenderness is present over the distal anterior ankle.  There is swelling dorsally of the foot and ankle.  No calf pain negative Homans' sign.  Neurological:  Grossly intact without motor or sensory deficits.   Three-view x-ray of the left foot/ankle reveals an avulsion fragment in the proximal dorsal foot.  No x-ray for comparison.  Indication left foot pain.  ASSESSMENT:      Diagnoses and all orders for this visit:    1. Hypoxemia (Primary)  -     CBC & Differential  -     Comprehensive Metabolic Panel  -     TSH  -     Urinalysis With Microscopic - Urine, Clean Catch  -     Urine Culture - Urine, Urine, Clean Catch  -     Urinalysis without microscopic (no culture) - Urine, Clean Catch  -     Urinalysis, Microscopic Only - Urine, Clean Catch  -     CBC Auto Differential    2. Abnormal urine color  -     CBC & Differential  -     Comprehensive Metabolic Panel  -     TSH  -     Urinalysis With " Microscopic - Urine, Clean Catch  -     Urine Culture - Urine, Urine, Clean Catch  -     Urinalysis without microscopic (no culture) - Urine, Clean Catch  -     Urinalysis, Microscopic Only - Urine, Clean Catch  -     CBC Auto Differential    3. Insomnia, unspecified type  -     CBC & Differential  -     Comprehensive Metabolic Panel  -     TSH  -     Urinalysis With Microscopic - Urine, Clean Catch  -     Urine Culture - Urine, Urine, Clean Catch  -     Urinalysis without microscopic (no culture) - Urine, Clean Catch  -     Urinalysis, Microscopic Only - Urine, Clean Catch  -     CBC Auto Differential    4. Osteopenia, unspecified location  -     DEXA Bone Density Axial; Future  -     CBC & Differential  -     Comprehensive Metabolic Panel  -     TSH  -     Urinalysis With Microscopic - Urine, Clean Catch  -     Urine Culture - Urine, Urine, Clean Catch  -     Urinalysis without microscopic (no culture) - Urine, Clean Catch  -     Urinalysis, Microscopic Only - Urine, Clean Catch  -     CBC Auto Differential    5. Essential hypertension  -     CBC & Differential  -     Comprehensive Metabolic Panel  -     TSH  -     Urinalysis With Microscopic - Urine, Clean Catch  -     Urine Culture - Urine, Urine, Clean Catch  -     Urinalysis without microscopic (no culture) - Urine, Clean Catch  -     Urinalysis, Microscopic Only - Urine, Clean Catch  -     CBC Auto Differential    6. Left ankle pain, unspecified chronicity  -     CBC & Differential  -     Comprehensive Metabolic Panel  -     TSH  -     Urinalysis With Microscopic - Urine, Clean Catch  -     Urine Culture - Urine, Urine, Clean Catch  -     XR Ankle 3+ View Left  -     Urinalysis without microscopic (no culture) - Urine, Clean Catch  -     Urinalysis, Microscopic Only - Urine, Clean Catch  -     CBC Auto Differential    7. Other specified disorders of bone density and structure, left ankle and foot   -     DEXA Bone Density Axial; Future  -     CBC &  Differential  -     Comprehensive Metabolic Panel  -     TSH  -     Urinalysis With Microscopic - Urine, Clean Catch  -     Urine Culture - Urine, Urine, Clean Catch  -     Urinalysis without microscopic (no culture) - Urine, Clean Catch  -     Urinalysis, Microscopic Only - Urine, Clean Catch  -     CBC Auto Differential    8. Oxygen dependent    9. Urinary tract infection with hematuria, site unspecified    Other orders  -     ciprofloxacin (Cipro) 500 MG tablet; Take 1 tablet by mouth 2 (Two) Times a Day.  Dispense: 20 tablet; Refill: 0         PLAN: See above labs and orders.  We discussed a sleep aid but in the presence of sleep apnea and shallow breathing I told him somewhat apprehensive about anything stronger than what he has been trying.  He has an appointment with his orthopedic surgeon next week.  He will try to mobilize the foot by wrapping it until he is seen.  They are to call or go to emergency room if any problems.  See above orders.  Will await culture for definitive treatment.    Dictated utilizing Dragon dictation.

## 2021-02-20 LAB
BACTERIA SPEC AEROBE CULT: ABNORMAL
BACTERIA SPEC AEROBE CULT: ABNORMAL

## 2021-02-23 ENCOUNTER — PATIENT OUTREACH (OUTPATIENT)
Dept: CASE MANAGEMENT | Facility: OTHER | Age: 71
End: 2021-02-23

## 2021-02-23 RX ORDER — AMOXICILLIN 875 MG/1
875 TABLET, COATED ORAL 2 TIMES DAILY
Qty: 20 TABLET | Refills: 0 | Status: SHIPPED | OUTPATIENT
Start: 2021-02-23 | End: 2021-03-24

## 2021-02-23 NOTE — OUTREACH NOTE
Care Coordination Note    Reviewed Urine Culture and antibiotics noted as  resistant and susceptible to patient's infection.  In basket message to Akiko Jimenez MA, to verify final review of culture results and antibiotic regimen.   Noted to change in antibiotic regimen from Dr. Avila for Amoxil and medication sent to patient's pharmacy and VM left for patient per PCP office MA.   Contacted Ms. Verma and informed of changes in antibiotics for UTI and voices understanding.  Aware medication has been sent over to their pharmacy and she will pick it up today.  No further questions of concerns voices.     Johan Dillard RN  Ambulatory     2/23/2021, 16:52 EST

## 2021-02-23 NOTE — OUTREACH NOTE
"Patient Outreach Note    Spoke with patient's wife and discussed follow-up appts with PCP, Urologist, and ortho.  Ortho, Dr. Raiv recommending a referral to a podiatrist for evaluation of charcot foot.  Expecting the referral to be initiated  by his office and they are to contact her with appt scheduling.  States in the past the endocrinologist, Dr. Sherman, has performed  diabetic foot exams.Blood sugar this morning 186 (has a freestyle monitor)  and has communicated with the office current status.   Hopkins cath and bag changed this week at Urology practice \"[facility]  Discussed recent diagnosis of UTI and states urine remains \"dark,\" and adherence to Cipro regimen as prescribed by PCP.  Educated to increase water intake and reports has increased intake of sugar free Gatorade.  He is limiting his physical exercise until seen by the podiatrist but utilizes the transport chair for outings. Dr Ravi has recommended outpatient phyiscal therapy 2-3 weeks for 4-6 weeks after patient has been evaluated by the podiatrist.  She will arrange the PT post appt.  States patient is sleeping better since the Cipro was initiated.  Denies further needs or concerns today.    Johan Dillard RN  Ambulatory     2/23/2021, 16:41 EST      "

## 2021-02-24 ENCOUNTER — TELEPHONE (OUTPATIENT)
Dept: FAMILY MEDICINE CLINIC | Facility: CLINIC | Age: 71
End: 2021-02-24

## 2021-02-24 RX ORDER — TAMSULOSIN HYDROCHLORIDE 0.4 MG/1
CAPSULE ORAL
Qty: 60 CAPSULE | Refills: 0 | Status: SHIPPED | OUTPATIENT
Start: 2021-02-24 | End: 2021-03-29

## 2021-02-24 NOTE — TELEPHONE ENCOUNTER
----- Message from Jaquan Otoole MD sent at 2/23/2021  6:37 PM EST -----  Regarding: RE: Uriine Culture  It is resistant to levothyroxine.  Probably resistant to Cipro.  Will change antibiotics.  ----- Message -----  From: Akiko Jimenez MA  Sent: 2/23/2021   3:57 PM EST  To: Jaquan Otoole MD  Subject: FW: Uriine Culture                                 ----- Message -----  From: Johan Dillard RN  Sent: 2/23/2021   3:44 PM EST  To: Akiko Jimenez MA  Subject: Uriine Culture                                   Noted your message to Ms. Verma with the culture pending and hopes that the antibiotic was sufficient.  Reviewed today the culture results and I am unable to determine if Cipro is adequate for his infection as not listed in the antibiotics for resistant or susceptible.  Thanks for your help.  Johan Dillard RN, BSN, [facility]  [phone].

## 2021-02-27 RX ORDER — NITROFURANTOIN 25; 75 MG/1; MG/1
100 CAPSULE ORAL 2 TIMES DAILY
Qty: 20 CAPSULE | Refills: 0 | Status: SHIPPED | OUTPATIENT
Start: 2021-02-27 | End: 2021-03-11

## 2021-03-02 ENCOUNTER — TELEPHONE (OUTPATIENT)
Dept: FAMILY MEDICINE CLINIC | Facility: CLINIC | Age: 71
End: 2021-03-02

## 2021-03-02 DIAGNOSIS — Z23 IMMUNIZATION DUE: ICD-10-CM

## 2021-03-02 NOTE — TELEPHONE ENCOUNTER
Caller: Janae Verma    Relationship to patient: Emergency Contact    Best call back number: [phone]      Patient is needing:     PATIENTS WIFE STATED THEY HAD A CONSULT WITH THE KIDNEY DOCTOR TODAY OVER THE PHONE,    DR MARTIN, THE UROLOGIST ISN'T AVAILABLE UNTIL Wednesday AND JANAE JUST CURIOUS IF THEY CAN/SHOULD BRING IN A URINE SAMPLE OF PATIENTS.     PATIENTS WIFE ALSO STATED DILLON STOPPED TAKING THE AMOXICILLIN ON Sunday AND HAS STARTED TAKING MACROBID TO REPLACE THAT ON Sunday AS WELL.    IS IT TOO SOON TO BRING IN A URINE SAMPLE, SINCE DILLON JUST STARTED TAKING THE MACROBID ON Sunday? HAS THERE BEEN ENOUGH TIME FOR THAT TO START WORKING OR NO?    PATIENTS WIFE SAID HIS URINE STARTED TO CLEAR UP AFTER THE FIRST ROUND OF AMOXICILLIN, BUT IT HAS GONE BACK TO BEING REALLY DARK SINCE.    CALL AND ADVISE: [phone]

## 2021-03-02 NOTE — TELEPHONE ENCOUNTER
No need to bring in sample yet.  I would bring the sample to urologist office if 1 were to bring a sample in.

## 2021-03-03 ENCOUNTER — TELEPHONE (OUTPATIENT)
Dept: FAMILY MEDICINE CLINIC | Facility: CLINIC | Age: 71
End: 2021-03-03

## 2021-03-03 NOTE — TELEPHONE ENCOUNTER
PATIENT AND WIFE CALLING IN SHE STATES THAT THEY BELIEVE HE HAS PASSED A FEW KIDNEY STONES INTO THE TUBING OF HIS CATH AND WANT TO KNOW IF THEY SHOULD DO SOME KIND VIRTUAL APPT WITH DOC OR BRING HIM IN TO HAVE A LOOK AT IT.    PLEASE ADVISE    CALLBACK NUMBER  [phone]

## 2021-03-04 ENCOUNTER — HOSPITAL ENCOUNTER (OUTPATIENT)
Dept: BONE DENSITY | Facility: HOSPITAL | Age: 71
Discharge: HOME | End: 2021-03-04
Admitting: FAMILY MEDICINE

## 2021-03-04 DIAGNOSIS — M85.80 OSTEOPENIA, UNSPECIFIED LOCATION: ICD-10-CM

## 2021-03-04 DIAGNOSIS — M85.872 OTHER SPECIFIED DISORDERS OF BONE DENSITY AND STRUCTURE, LEFT ANKLE AND FOOT: ICD-10-CM

## 2021-03-04 PROCEDURE — 77080 DXA BONE DENSITY AXIAL: CPT

## 2021-03-08 DIAGNOSIS — R79.89 ELEVATED LFTS: Primary | ICD-10-CM

## 2021-03-08 DIAGNOSIS — R53.83 OTHER FATIGUE: ICD-10-CM

## 2021-03-09 ENCOUNTER — LAB (OUTPATIENT)
Dept: FAMILY MEDICINE CLINIC | Facility: CLINIC | Age: 71
End: 2021-03-09

## 2021-03-09 ENCOUNTER — TELEPHONE (OUTPATIENT)
Dept: FAMILY MEDICINE CLINIC | Facility: CLINIC | Age: 71
End: 2021-03-09

## 2021-03-09 ENCOUNTER — TELEPHONE (OUTPATIENT)
Dept: CARDIOLOGY | Facility: CLINIC | Age: 71
End: 2021-03-09

## 2021-03-09 DIAGNOSIS — E87.70 LOCALIZED EDEMA DUE TO FLUID OVERLOAD: Primary | ICD-10-CM

## 2021-03-09 DIAGNOSIS — I50.32 CHRONIC DIASTOLIC (CONGESTIVE) HEART FAILURE (HCC): ICD-10-CM

## 2021-03-09 LAB
ALBUMIN SERPL-MCNC: 3.3 G/DL (ref 3.5–5.2)
ALBUMIN/GLOB SERPL: 0.8 G/DL
ALP SERPL-CCNC: 443 U/L (ref 39–117)
ALT SERPL W P-5'-P-CCNC: 43 U/L (ref 1–41)
ANION GAP SERPL CALCULATED.3IONS-SCNC: 10.8 MMOL/L (ref 5–15)
AST SERPL-CCNC: 50 U/L (ref 1–40)
BILIRUB CONJ SERPL-MCNC: 2.2 MG/DL (ref 0–0.3)
BILIRUB SERPL-MCNC: 3 MG/DL (ref 0–1.2)
BUN SERPL-MCNC: 18 MG/DL (ref 8–23)
BUN/CREAT SERPL: 15.7 (ref 7–25)
CALCIUM SPEC-SCNC: 9 MG/DL (ref 8.6–10.5)
CHLORIDE SERPL-SCNC: 99 MMOL/L (ref 98–107)
CO2 SERPL-SCNC: 28.2 MMOL/L (ref 22–29)
CREAT SERPL-MCNC: 1.15 MG/DL (ref 0.76–1.27)
GFR SERPL CREATININE-BSD FRML MDRD: 63 ML/MIN/1.73
GLOBULIN UR ELPH-MCNC: 3.9 GM/DL
GLUCOSE SERPL-MCNC: 169 MG/DL (ref 65–99)
HAV IGM SERPL QL IA: NORMAL
HBV CORE IGM SERPL QL IA: NORMAL
HBV SURFACE AG SERPL QL IA: NORMAL
HCV AB SER DONR QL: NORMAL
NT-PROBNP SERPL-MCNC: 4532 PG/ML (ref 0–900)
POTASSIUM SERPL-SCNC: 4.4 MMOL/L (ref 3.5–5.2)
PROT SERPL-MCNC: 7.2 G/DL (ref 6–8.5)
SODIUM SERPL-SCNC: 138 MMOL/L (ref 136–145)

## 2021-03-09 PROCEDURE — 82248 BILIRUBIN DIRECT: CPT | Performed by: FAMILY MEDICINE

## 2021-03-09 PROCEDURE — 83880 ASSAY OF NATRIURETIC PEPTIDE: CPT | Performed by: FAMILY MEDICINE

## 2021-03-09 PROCEDURE — 80074 ACUTE HEPATITIS PANEL: CPT | Performed by: FAMILY MEDICINE

## 2021-03-09 PROCEDURE — 36415 COLL VENOUS BLD VENIPUNCTURE: CPT | Performed by: FAMILY MEDICINE

## 2021-03-09 PROCEDURE — 80053 COMPREHEN METABOLIC PANEL: CPT | Performed by: FAMILY MEDICINE

## 2021-03-09 NOTE — TELEPHONE ENCOUNTER
Called and left a message for patient's wife. Advised per Danii's instruction OK for patient to take Lasix up to 2 tablets daily.  I asked that she call me back tomorrow morning so let me know if she feels patient needs to be seen./ JAY

## 2021-03-09 NOTE — TELEPHONE ENCOUNTER
Patient's wife (Beny) called to report she feels patient may be retaining fluid.  Hx of CHF.  Patient had fallen in Dec and broke hip.  They later found his to have a fracture in his foot. Patient can't stand on the scale to obtain his weight.  Patient's abdomen is tight.  Wife states she measured around his abdomen previously and it was 47 inches now 52 inches.  Patient wears compression socks and she feels may the fluid has now settled in his abdomen.  He is currently taking Furosemide 40 mg daily.  Patient is on 2 liters of oxygen so she can't tell if he is SOA.     Patient has an appt with [facility] on 3/23/21.  He is going to Dr. Avila's office today around two oclock to have liver enzymes drawn.  If you would like to have additional labs, I can call Dr. Avila and see if they will draw them.  He is a [facility] affiliated physician. / JAY Swan can be reached at [phone].

## 2021-03-09 NOTE — TELEPHONE ENCOUNTER
PATIENTS WIFE CALLED STATING THEY WERE COMING IN FOR LABS TODAY AROUND 2-3, STATES SRIIA IST FEELING WELL, THINKS HE IS RETAINING FLUID, WAS WONDERING IF HE COULD COME IN AND BE WEIGHED TODAY WHILE GETTING LABS  WAS NOT ABLE TO GET HIM A SAME DAY APPT, PLEASE ADVISE.         CALL BACK:[phone]

## 2021-03-10 DIAGNOSIS — I50.32 CHRONIC DIASTOLIC (CONGESTIVE) HEART FAILURE (HCC): Primary | ICD-10-CM

## 2021-03-11 ENCOUNTER — OFFICE VISIT (OUTPATIENT)
Dept: CARDIOLOGY | Facility: CLINIC | Age: 71
End: 2021-03-11

## 2021-03-11 VITALS
WEIGHT: 213 LBS | HEART RATE: 61 BPM | HEIGHT: 69 IN | DIASTOLIC BLOOD PRESSURE: 80 MMHG | BODY MASS INDEX: 31.55 KG/M2 | SYSTOLIC BLOOD PRESSURE: 104 MMHG

## 2021-03-11 DIAGNOSIS — I50.33 ACUTE ON CHRONIC DIASTOLIC CHF (CONGESTIVE HEART FAILURE) (HCC): Primary | ICD-10-CM

## 2021-03-11 PROCEDURE — 99214 OFFICE O/P EST MOD 30 MIN: CPT | Performed by: INTERNAL MEDICINE

## 2021-03-11 RX ORDER — FUROSEMIDE 40 MG/1
80 TABLET ORAL 2 TIMES DAILY
Start: 2021-03-11 | End: 2021-03-24 | Stop reason: SDUPTHER

## 2021-03-11 NOTE — PROGRESS NOTES
[facility]      Patient Name: Dilip Verma  :1950  Age: 70 y.o.  Encounter Provider:  Amador Reyes Jr, MD      Chief Complaint:   Chief Complaint   Patient presents with   • Edema     legs         HPI  Dilip Verma is a 70 y.o. male with history of hypertension, hyperlipidemia, diabetes mellitus, coronary artery disease, congestive heart failure, thalamic hemorrhage, pulmonary hypertension, and obstructive sleep apnea.  He is here to establish care with me today.     He was admitted to [facility] in 2019 for syncope.  He was orthostatic with supine hypertension.  Clonidine was discontinued and hydralazine was increased.  He then presented to [facility] on 2019 with uncontrolled blood pressure and blurry vision.  He had an abnormal BNP and troponin.  Echocardiogram showed an ejection fraction of 53%, moderate left ventricular hypertrophy, mild to moderate left atrial enlargement and no significant valvular disease.  He had a Holter monitor which showed paroxysmal atrial tachycardia.  Due to his elevated troponin he underwent cardiac catheterization which showed normal ejection fraction with multivessel coronary artery disease. He underwent coronary artery bypass grafting with LIMA to LAD, vein graft to obtuse marginal 1, vein graft to distal right coronary artery. He was noted to have postoperative atrial fibrillation and was discharged on amiodarone.     He was readmitted in early 2019 with chest pain and non-STEMI.  Perfusion stress test suggested inferior wall and lateral wall ischemia. Heart catheterization completed 2019 showed widely patent grafts.      On 2020 he fell and was diagnosed with multiple femoral fractures.    He has undergone 2 separate surgical interventions for his leg which have left him immobilized over the last few months.  He just got home from skilled nursing facility in has been able to bear weight for a  "long time.  This has been disruptive to monitoring fluid status at home and she noted that his waist increased in size significantly.  He is feeling more short of breath.  He has increased lower extremity edema.  No angina.  No dizziness or syncope.  Has been sleeping in a recliner.  He is not using his BiPAP as he feels that he needs oxygen at night and visit has been using nasal cannula.  He is currently on Lasix 80 mg in the morning and 40 mg at night.         The following portions of the patient's history were reviewed and updated as appropriate: allergies, current medications, past family history, past medical history, past social history, past surgical history and problem list.      Review of Systems   Constitutional: Negative for chills and fever.   HENT: Negative for hoarse voice and sore throat.    Eyes: Negative for double vision and photophobia.   Cardiovascular: Positive for dyspnea on exertion, leg swelling, orthopnea and paroxysmal nocturnal dyspnea. Negative for chest pain, near-syncope, palpitations and syncope.   Respiratory: Positive for shortness of breath. Negative for cough and wheezing.    Skin: Negative for poor wound healing and rash.   Musculoskeletal: Negative for arthritis and joint swelling.   Gastrointestinal: Negative for bloating, abdominal pain, hematemesis and hematochezia.   Neurological: Negative for dizziness and focal weakness.   Psychiatric/Behavioral: Negative for depression and suicidal ideas.       OBJECTIVE:   Vital Signs  Vitals:    03/11/21 1120   BP: 104/80   Pulse: 61     Estimated body mass index is 31.45 kg/m² as calculated from the following:    Height as of this encounter: 175.3 cm (69\").    Weight as of this encounter: 96.6 kg (213 lb).    Vitals reviewed.   Constitutional:       Appearance: Healthy appearance. Not in distress.   Neck:      Vascular: JVR present. JVD elevated.   Pulmonary:      Effort: Pulmonary effort is normal.      Breath sounds: No wheezing. No " rhonchi. Rales present.   Chest:      Chest wall: Not tender to palpatation.   Cardiovascular:      PMI at left midclavicular line. Normal rate. Regular rhythm. Normal S1. Normal S2.      Murmurs: There is no murmur.      No gallop. No click. No rub.   Pulses:     Intact distal pulses.   Edema:     Peripheral edema present.  Abdominal:      General: Bowel sounds are normal.      Palpations: Abdomen is soft.      Tenderness: There is no abdominal tenderness.   Musculoskeletal: Normal range of motion.         General: No tenderness. Skin:     General: Skin is warm and dry.   Neurological:      General: No focal deficit present.      Mental Status: Alert and oriented to person, place and time.         Procedures          ASSESSMENT:     70-year-old male with multiple medical comorbidities presents for worsening heart failure.    PLAN OF CARE:     1. Coronary artery disease status post coronary artery bypass grafting times 3 July 2019 with LIMA to LAD, vein graft to obtuse marginal 1, vein graft to distal right coronary artery with normal left ventricular systolic function.  Then non-STEMI and  inferolateral wall ischemia on perfusion stress test.  Cardiac catheterization 11/2019 showed patent grafts normal left ventricular systolic function.  No angina.  Continue aspirin and beta-blocker.  He needs to be on a statin.  We will research past history with cholesterol-lowering medications and discuss with PCP.  2.  Hypertension .    Well-controlled on current regimen.  Sodium restricted diet.  Twice daily blood pressure log.  3.  Hyperlipidemia- unclear why he is not on statin therapy  4.  Diabetes mellitus followed by PCP  5.  Obstructive sleep apnea still having difficulty tolerating BiPAP.  Follows with Dr. Cai   6.  Pulmonary hypertension  7.  Postoperative atrial fibrillation- no known recurrence  8.  Bilateral carotid artery stenosis mild on duplex July 2019  9.  Grade 3 diastolic dysfunction on echocardiogram  July 2019  10. Remote history of thalamic hemorrhage noted on MRI July 2019  11. History of hypogonadism with prior testosterone replacement treatment.    12.  Acute on chronic diastolic congestive heart failure  -he remains hypervolemic despite increasing Lasix therapy.  We will increase Lasix to 80 mg twice daily.  Repeat BMP in 1 week.  Wife will call me next week with symptoms and monitor abdominal girth.  13. Chronic kidney disease follows with nephrology     Return to clinic 1 month with LEIDY Guerrier.  Return to clinic 3 months for me.       Discharge Medications          Accurate as of March 11, 2021 11:30 AM. If you have any questions, ask your nurse or doctor.            Changes to Medications      Instructions Start Date   furosemide 40 MG tablet  Commonly known as: LASIX  What changed: additional instructions   40 mg, Oral, Daily         Continue These Medications      Instructions Start Date   acetaminophen 325 MG tablet  Commonly known as: TYLENOL   650 mg, Oral, Every 4 Hours PRN      amoxicillin 875 MG tablet  Commonly known as: AMOXIL   875 mg, Oral, 2 Times Daily      aspirin 81 MG EC tablet   81 mg, Oral, Daily      carvedilol 12.5 MG tablet  Commonly known as: COREG   12.5 mg, Oral, 2 Times Daily With Meals      ciprofloxacin 500 MG tablet  Commonly known as: Cipro   500 mg, Oral, 2 Times Daily      hydrALAZINE 25 MG tablet  Commonly known as: APRESOLINE   25 mg, Oral, 2 Times Daily PRN      HYDROcodone-acetaminophen 5-325 MG per tablet  Commonly known as: NORCO   1 tablet, Oral, Every 8 Hours PRN      Insulin Glargine (2 Unit Dial) 300 UNIT/ML solution pen-injector injection  Commonly known as: TOUJEO   No dose, route, or frequency recorded.      insulin glargine 100 UNIT/ML injection  Commonly known as: LANTUS, SEMGLEE   20 Units, Subcutaneous, Nightly      insulin lispro 100 UNIT/ML injection  Commonly known as: humaLOG   0-14 Units, Subcutaneous, 3 Times Daily Before Meals      melatonin 3  MG tablet   3 mg, Oral, Nightly      polyethylene glycol 17 g packet  Commonly known as: MIRALAX   17 g, Oral, Daily      sennosides-docusate 8.6-50 MG per tablet  Commonly known as: PERICOLACE   2 tablets, Oral, 2 times daily      tamsulosin 0.4 MG capsule 24 hr capsule  Commonly known as: FLOMAX   TAKE 2 CAPSULES BY MOUTH AT BEDTIME       vitamin D3 125 MCG (5000 UT) capsule capsule   5,000 Units, Oral, Daily         Stop These Medications    nitrofurantoin (macrocrystal-monohydrate) 100 MG capsule  Commonly known as: Macrobid  Stopped by: Amador Reyes Jr, MD            Thank you for allowing me to participate in the care of your patient,      Sincerely,   Amador Reyes MD  [facility]  03/11/21  11:30 EST

## 2021-03-12 RX ORDER — CARVEDILOL 12.5 MG/1
TABLET ORAL
Qty: 180 TABLET | Refills: 2 | Status: SHIPPED | OUTPATIENT
Start: 2021-03-12 | End: 2022-01-07

## 2021-03-15 ENCOUNTER — LAB (OUTPATIENT)
Dept: FAMILY MEDICINE CLINIC | Facility: CLINIC | Age: 71
End: 2021-03-15

## 2021-03-15 DIAGNOSIS — Z51.81 MEDICATION MONITORING ENCOUNTER: ICD-10-CM

## 2021-03-15 DIAGNOSIS — R31.9 URINARY TRACT INFECTION WITH HEMATURIA, SITE UNSPECIFIED: Primary | ICD-10-CM

## 2021-03-15 DIAGNOSIS — N39.0 URINARY TRACT INFECTION WITH HEMATURIA, SITE UNSPECIFIED: Primary | ICD-10-CM

## 2021-03-15 LAB
BACTERIA UR QL AUTO: ABNORMAL /HPF
BILIRUB UR QL STRIP: NEGATIVE
CLARITY UR: CLEAR
COLOR UR: YELLOW
GLUCOSE UR STRIP-MCNC: NEGATIVE MG/DL
HGB UR QL STRIP.AUTO: ABNORMAL
KETONES UR QL STRIP: NEGATIVE
LEUKOCYTE ESTERASE UR QL STRIP.AUTO: ABNORMAL
NITRITE UR QL STRIP: NEGATIVE
PH UR STRIP.AUTO: 7 [PH] (ref 4.6–8)
PROT UR QL STRIP: ABNORMAL
RBC # UR: ABNORMAL /HPF
REF LAB TEST METHOD: ABNORMAL
SP GR UR STRIP: 1.02 (ref 1–1.03)
SQUAMOUS #/AREA URNS HPF: ABNORMAL /HPF
STARCH GRANULES URNS QL MICRO: ABNORMAL /HPF
UROBILINOGEN UR QL STRIP: ABNORMAL
WBC UR QL AUTO: ABNORMAL /HPF

## 2021-03-15 PROCEDURE — 81001 URINALYSIS AUTO W/SCOPE: CPT | Performed by: FAMILY MEDICINE

## 2021-03-15 PROCEDURE — 87086 URINE CULTURE/COLONY COUNT: CPT | Performed by: FAMILY MEDICINE

## 2021-03-15 PROCEDURE — 87186 SC STD MICRODIL/AGAR DIL: CPT | Performed by: FAMILY MEDICINE

## 2021-03-15 PROCEDURE — 87077 CULTURE AEROBIC IDENTIFY: CPT | Performed by: FAMILY MEDICINE

## 2021-03-17 LAB — BACTERIA SPEC AEROBE CULT: ABNORMAL

## 2021-03-19 ENCOUNTER — LAB (OUTPATIENT)
Dept: FAMILY MEDICINE CLINIC | Facility: CLINIC | Age: 71
End: 2021-03-19

## 2021-03-19 LAB
ANION GAP SERPL CALCULATED.3IONS-SCNC: 10.5 MMOL/L (ref 5–15)
BUN SERPL-MCNC: 20 MG/DL (ref 8–23)
BUN/CREAT SERPL: 17.9 (ref 7–25)
CALCIUM SPEC-SCNC: 8.7 MG/DL (ref 8.6–10.5)
CHLORIDE SERPL-SCNC: 99 MMOL/L (ref 98–107)
CO2 SERPL-SCNC: 30.5 MMOL/L (ref 22–29)
CREAT SERPL-MCNC: 1.12 MG/DL (ref 0.76–1.27)
DRUGS UR: NORMAL
GFR SERPL CREATININE-BSD FRML MDRD: 65 ML/MIN/1.73
GLUCOSE SERPL-MCNC: 184 MG/DL (ref 65–99)
NT-PROBNP SERPL-MCNC: 4257 PG/ML (ref 0–900)
POTASSIUM SERPL-SCNC: 3.7 MMOL/L (ref 3.5–5.2)
SODIUM SERPL-SCNC: 140 MMOL/L (ref 136–145)

## 2021-03-19 PROCEDURE — 80048 BASIC METABOLIC PNL TOTAL CA: CPT | Performed by: FAMILY MEDICINE

## 2021-03-19 PROCEDURE — 36415 COLL VENOUS BLD VENIPUNCTURE: CPT | Performed by: FAMILY MEDICINE

## 2021-03-19 PROCEDURE — 83880 ASSAY OF NATRIURETIC PEPTIDE: CPT | Performed by: FAMILY MEDICINE

## 2021-03-23 DIAGNOSIS — I50.9 CHRONIC CONGESTIVE HEART FAILURE, UNSPECIFIED HEART FAILURE TYPE (HCC): Primary | ICD-10-CM

## 2021-03-24 ENCOUNTER — PATIENT OUTREACH (OUTPATIENT)
Dept: CASE MANAGEMENT | Facility: OTHER | Age: 71
End: 2021-03-24

## 2021-03-24 ENCOUNTER — TELEPHONE (OUTPATIENT)
Dept: CARDIOLOGY | Facility: CLINIC | Age: 71
End: 2021-03-24

## 2021-03-24 RX ORDER — FUROSEMIDE 40 MG/1
60 TABLET ORAL 2 TIMES DAILY
Start: 2021-03-24 | End: 2021-04-23 | Stop reason: HOSPADM

## 2021-03-24 NOTE — TELEPHONE ENCOUNTER
Called and discussed with patient's wife, Bertin. I explained the heart failure clinic and their services but we have not yet referred patient at this time.  Bertin states that more doctor appointments is not what they desire at this time due to mobility concerns so she does not want that referral placed at this time.      I reviewed proBNP and renal function from yesterday.  I discussed with Bertin there was a discrepancy with furosemide dose in the chart and she confirmed that he is taking furosemide 40 in the morning and now 60 in the evening.  ( our notes stated 80 mg BID but wife states he was never taking that much) However, due to sustained elevated proBNP I am increasing furosemide to 60 mg twice daily.  Wife verbalized understanding to start that dose tomorrow morning.  Med list was updated.  Patient is to have repeat labs at Dr. Dakota Avila office next week reportedly.  Of renal function remained stable then would challenge him to 80 mg twice daily with repeat labs again.  I have an appointment with him 4/14 and he has an echo scheduled on 4/2.

## 2021-03-24 NOTE — OUTREACH NOTE
"Patient Outreach Note    Patient's wife returned ACM Call and discussed current diuretic regimen with Lasix 60mg twice a day, to begin in AM with repeat labs at Dr. Avila'   office next week. Has had difficulties with daily weights due to NWB status but states since Feb. weight has increase six pounds. Wearing compression stockings which has reduced pedal edema.  Resting in a recliner as had hospital bed picked up by [facility] and upon delivery of lift chair, returned to [facility] due to \"2 bolts missing,\" and not safe.    Reports urologist placed patient on another antibiotic for UTI to be completed today.  Reports some diarrhea. Requested to terminate call to assist her  to bathroom.     Johan Dillard RN  Ambulatory     3/24/2021, 15:24 EDT      "

## 2021-03-24 NOTE — TELEPHONE ENCOUNTER
Patient's wife (tone) called to inquire about a clinic that Dr. Reyes mentioned at the hospital for patient.  Patient is currently taking Lasix 40 mg in am 60 mg in pm.  Patient is non weightbearing so they can not weigh him. / JAY     Please call Tone at [phone]

## 2021-03-29 RX ORDER — TAMSULOSIN HYDROCHLORIDE 0.4 MG/1
CAPSULE ORAL
Qty: 60 CAPSULE | Refills: 0 | Status: SHIPPED | OUTPATIENT
Start: 2021-03-29 | End: 2021-06-16 | Stop reason: SDUPTHER

## 2021-04-02 ENCOUNTER — HOSPITAL ENCOUNTER (OUTPATIENT)
Dept: CARDIOLOGY | Facility: HOSPITAL | Age: 71
Discharge: HOME | End: 2021-04-02
Admitting: INTERNAL MEDICINE

## 2021-04-02 VITALS — BODY MASS INDEX: 31.55 KG/M2 | WEIGHT: 213 LBS | HEART RATE: 60 BPM | HEIGHT: 69 IN

## 2021-04-02 DIAGNOSIS — I50.33 ACUTE ON CHRONIC DIASTOLIC CHF (CONGESTIVE HEART FAILURE) (HCC): ICD-10-CM

## 2021-04-02 PROCEDURE — 93306 TTE W/DOPPLER COMPLETE: CPT

## 2021-04-02 PROCEDURE — 93306 TTE W/DOPPLER COMPLETE: CPT | Performed by: INTERNAL MEDICINE

## 2021-04-05 ENCOUNTER — HOSPITAL ENCOUNTER (EMERGENCY)
Facility: HOSPITAL | Age: 71
Discharge: HOME | End: 2021-04-05
Attending: EMERGENCY MEDICINE | Admitting: EMERGENCY MEDICINE

## 2021-04-05 ENCOUNTER — TELEPHONE (OUTPATIENT)
Dept: CARDIOLOGY | Facility: CLINIC | Age: 71
End: 2021-04-05

## 2021-04-05 VITALS
SYSTOLIC BLOOD PRESSURE: 156 MMHG | OXYGEN SATURATION: 96 % | TEMPERATURE: 97.6 F | RESPIRATION RATE: 18 BRPM | DIASTOLIC BLOOD PRESSURE: 82 MMHG | HEART RATE: 63 BPM

## 2021-04-05 DIAGNOSIS — R33.9 URINARY RETENTION: Primary | ICD-10-CM

## 2021-04-05 DIAGNOSIS — R30.1 PAINFUL BLADDER SPASM: ICD-10-CM

## 2021-04-05 PROCEDURE — 51702 INSERT TEMP BLADDER CATH: CPT

## 2021-04-05 PROCEDURE — 25010000002 HYDROMORPHONE PER 4 MG: Performed by: EMERGENCY MEDICINE

## 2021-04-05 PROCEDURE — 25010000002 ONDANSETRON PER 1 MG: Performed by: EMERGENCY MEDICINE

## 2021-04-05 PROCEDURE — 96375 TX/PRO/DX INJ NEW DRUG ADDON: CPT

## 2021-04-05 PROCEDURE — 96374 THER/PROPH/DIAG INJ IV PUSH: CPT

## 2021-04-05 PROCEDURE — 99283 EMERGENCY DEPT VISIT LOW MDM: CPT

## 2021-04-05 RX ORDER — SODIUM CHLORIDE 0.9 % (FLUSH) 0.9 %
10 SYRINGE (ML) INJECTION AS NEEDED
Status: DISCONTINUED | OUTPATIENT
Start: 2021-04-05 | End: 2021-04-05 | Stop reason: HOSPADM

## 2021-04-05 RX ORDER — HYDROMORPHONE HYDROCHLORIDE 1 MG/ML
0.5 INJECTION, SOLUTION INTRAMUSCULAR; INTRAVENOUS; SUBCUTANEOUS ONCE
Status: COMPLETED | OUTPATIENT
Start: 2021-04-05 | End: 2021-04-05

## 2021-04-05 RX ORDER — LIDOCAINE HYDROCHLORIDE 20 MG/ML
JELLY TOPICAL ONCE
Status: DISCONTINUED | OUTPATIENT
Start: 2021-04-05 | End: 2021-04-05

## 2021-04-05 RX ORDER — LIDOCAINE HYDROCHLORIDE 20 MG/ML
JELLY TOPICAL ONCE
Status: COMPLETED | OUTPATIENT
Start: 2021-04-05 | End: 2021-04-05

## 2021-04-05 RX ORDER — ONDANSETRON 2 MG/ML
4 INJECTION INTRAMUSCULAR; INTRAVENOUS ONCE
Status: COMPLETED | OUTPATIENT
Start: 2021-04-05 | End: 2021-04-05

## 2021-04-05 RX ORDER — LIDOCAINE HYDROCHLORIDE 20 MG/ML
JELLY TOPICAL AS NEEDED
Status: DISCONTINUED | OUTPATIENT
Start: 2021-04-05 | End: 2021-04-05

## 2021-04-05 RX ADMIN — HYDROMORPHONE HYDROCHLORIDE 0.5 MG: 1 INJECTION, SOLUTION INTRAMUSCULAR; INTRAVENOUS; SUBCUTANEOUS at 16:54

## 2021-04-05 RX ADMIN — ONDANSETRON 4 MG: 2 INJECTION INTRAMUSCULAR; INTRAVENOUS at 16:54

## 2021-04-05 RX ADMIN — LIDOCAINE HYDROCHLORIDE: 20 JELLY TOPICAL at 16:59

## 2021-04-05 NOTE — ED PROVIDER NOTES
EMERGENCY DEPARTMENT ENCOUNTER    Room Number:  20/20  Date of encounter:  4/5/2021  PCP: Dakota Avila MD  Historian: Patient      HPI:  Chief Complaint: Urinary retention  A complete HPI/ROS/PMH/PSH/SH/FH are unobtainable due to: Pain    Context: Dilip Verma is a 71 y.o. male who presents to the ED c/o inability to completely void since having his Hopkins catheter removed at the urologist office this morning around 10 AM.  He complains of severe lower abdominal pain.  He states he is only been able to urinate small amounts at a time.  No fevers.  No nausea, vomiting or diarrhea.      The patient was placed in a mask in triage, hand hygiene was performed before and after my interaction with the patient.  I wore a mask, safety glasses and gloves during my entire interaction with the patient.    PAST MEDICAL HISTORY  Active Ambulatory Problems     Diagnosis Date Noted   • Anxiety    • Colon polyp    • Diabetes mellitus, type II (CMS/[facility])    • Erectile dysfunction    • Hyperlipidemia    • CAD (coronary artery disease) 07/20/2019   • Hx of CABG 08/19/2019   • Chronic diastolic CHF (congestive heart failure) (CMS/[facility]) 09/11/2019   • Hypersomnia due to medical condition 09/14/2019   • CSA (central sleep apnea) 09/14/2019   • Periodic breathing 09/14/2019   • HTN (hypertension) 02/26/2020   • Hypoxia 02/26/2020   • Closed nondisplaced intertrochanteric fracture of left femur (CMS/[facility]) 12/01/2020   • History of stroke 12/01/2020   • CKD (chronic kidney disease) stage 3, GFR 30-59 ml/min (CMS/HCC) 12/01/2020   • Urinary retention 12/02/2020   • AMENA (acute kidney injury) (CMS/[facility]) 12/03/2020   • Acute posthemorrhagic anemia 12/04/2020   • Complicated UTI (urinary tract infection) 12/16/2020     Resolved Ambulatory Problems     Diagnosis Date Noted   • NSTEMI (non-ST elevated myocardial infarction) (CMS/[facility]) 07/12/2019   • Orthostasis 07/20/2019     Past Medical History:   Diagnosis Date   • Chronic diastolic (congestive)  heart failure (CMS/[facility])    • Chronic kidney disease    • Coronary artery disease    • H/O bone density study never   • Hypersomnia    • Hypertension    • Kidney stone    • Routine eye exam 2015   • Sleep apnea    • Stroke (CMS/[facility])          PAST SURGICAL HISTORY  Past Surgical History:   Procedure Laterality Date   • CARDIAC CATHETERIZATION N/A 7/15/2019    Procedure: Coronary angiography;  Surgeon: Carrie Price MD;  Location[facility] INVASIVE LOCATION;  Service: Cardiovascular   • CARDIAC CATHETERIZATION N/A 7/15/2019    Procedure: Left Heart Cath;  Surgeon: Carrie Price MD;  Location: [facility] CATH INVASIVE LOCATION;  Service: Cardiovascular   • CARDIAC CATHETERIZATION N/A 7/15/2019    Procedure: Left ventriculography;  Surgeon: Carrie Price MD;  Location: [facility] CATH INVASIVE LOCATION;  Service: Cardiovascular   • CARDIAC CATHETERIZATION  7/15/2019    Procedure: Functional Flow [place];  Surgeon: Carrie Price MD;  Location: [facility] CATH INVASIVE LOCATION;  Service: Cardiovascular   • CARDIAC CATHETERIZATION N/A 11/6/2019    Procedure: Right and Left Heart Cath;  Surgeon: Mya Smith MD;  Location: [facility] CATH INVASIVE LOCATION;  Service: Cardiovascular   • CARDIAC CATHETERIZATION N/A 11/6/2019    Procedure: Coronary angiography;  Surgeon: Mya Smith MD;  Location: [facility] CATH INVASIVE LOCATION;  Service: Cardiovascular   • CATARACT EXTRACTION EXTRACAPSULAR W/ INTRAOCULAR LENS IMPLANTATION Bilateral    • COLONOSCOPY  01/2015    dr [email or web address]   • CORONARY ARTERY BYPASS GRAFT N/A 7/18/2019    Procedure: INTRAOPERATIVE SHOAIB; STERNOTOMY CORONARY ARTERY BYPASS x 3  USING LEFT INTERNAL MAMMARY ARTERY GRAFT UTILIZING ENDOSCOPICALLY HARVESTED RIGHT GREATER SAPHENOUS VEIN AND PRP.;  Surgeon: Bill Devi MD;  Location: [facility] MAIN OR;  Service: Cardiothoracic   • DENTAL PROCEDURE      3 surgeries inder implants   • FEMUR IM NAILING/RODDING Left 12/3/2020    Procedure: LEFT HIP INTRAMEDULLARY NAIL;   Surgeon: Niraj Ravi MD;  Location: [facility] MAIN OR;  Service: Orthopedic Spine;  Laterality: Left;   • HERNIA REPAIR      inguinal bilaterally   • KIDNEY STONE SURGERY      lithotripsy         FAMILY HISTORY  Family History   Problem Relation Age of Onset   • Depression Mother    • Cancer Mother         uterine   • Arrhythmia Mother    • Heart disease Father    • Hypertension Father    • Kidney disease Father    • Thyroid disease Father    • Depression Sister    • Alcohol abuse Brother    • Alcohol abuse Other    • Alcohol abuse Other    • Lung disease Other    • Thyroid disease Other    • Glaucoma Other    • Heart attack Other    • Stroke Maternal Grandmother    • Stroke Paternal Grandmother          SOCIAL HISTORY  Social History     Socioeconomic History   • Marital status:      Spouse name: Not on file   • Number of children: Not on file   • Years of education: Not on file   • Highest education level: Not on file   Tobacco Use   • Smoking status: Former Smoker     Packs/day: 0.75     Years: 16.00     Pack years: 12.00     Quit date:      Years since quittin.2   • Smokeless tobacco: Never Used   • Tobacco comment: Start age:39/Stopping age:47, 3/4 PPD   Substance and Sexual Activity   • Alcohol use: No     Comment: caffeine use - 1 cup daily   • Drug use: No   • Sexual activity: Defer         ALLERGIES  Ace inhibitors, Clonidine derivatives, Losartan, Amlodipine, Ativan [lorazepam], Xanax [alprazolam], Minoxidil, and Tetracycline        REVIEW OF SYSTEMS  Review of Systems   Constitutional: Negative for fever.   Gastrointestinal: Negative for abdominal pain, diarrhea, nausea and vomiting.   Genitourinary: Positive for decreased urine volume and difficulty urinating.        All systems reviewed and negative except for those discussed in HPI.       PHYSICAL EXAM    I have reviewed the triage vital signs and nursing notes.    ED Triage Vitals   Temp Heart Rate Resp BP SpO2   21 1613  04/05/21 1611 04/05/21 1611 04/05/21 1611 04/05/21 1611   97.6 °F (36.4 °C) 68 18 (!) 200/94 98 %      Temp src Heart Rate Source Patient Position BP Location FiO2 (%)   04/05/21 1613 04/05/21 1611 04/05/21 1611 04/05/21 1611 --   Tympanic Monitor Sitting Right arm        Physical Exam   Constitutional: Pt. is alert.  He appears oriented.  He he is very uncomfortable.   HENT: Normocephalic and atraumatic.  Neck: Normal range of motion. Neck supple. No JVD present. No tracheal deviation present. No thyromegaly present.   Cardiovascular: Normal rate, regular rhythm and normal heart sounds. Exam reveals no gallop and no friction rub.   No murmur heard.  Pulmonary/Chest: Effort normal and breath sounds normal. No stridor. No respiratory distress. No wheezes, no rales.   Abdominal: He has suprapubic tenderness.  [place] exam is otherwise unremarkable-there is no guarding or rebound.   Neurological: Pt. is awake alert.  He has no focal neurologic deficits..   Skin: Skin is warm and dry. No rash noted. Pt. is not diaphoretic. No erythema.   Psychiatric: Mood, affect and judgment normal.   Nursing note and vitals reviewed.        LAB RESULTS  No results found for this or any previous visit (from the past 24 hour(s)).    Ordered the above labs and independently reviewed the results.        RADIOLOGY  No Radiology Exams Resulted Within Past 24 Hours    I ordered the above noted radiological studies. Reviewed by me and discussed with radiologist.  See dictation for official radiology interpretation.      PROCEDURES    Procedures      MEDICATIONS GIVEN IN ER    Medications   sodium chloride 0.9 % flush 10 mL (has no administration in time range)   Lidocaine HCl Urethral/Mucosal 2% (XYLOCAINE) gel syringe ( Topical Given 4/5/21 1659)   HYDROmorphone (DILAUDID) injection 0.5 mg (0.5 mg Intravenous Given 4/5/21 1654)   ondansetron (ZOFRAN) injection 4 mg (4 mg Intravenous Given 4/5/21 1654)         PROGRESS, DATA ANALYSIS,  CONSULTS, AND MEDICAL DECISION MAKING    Any/all labs have been independently reviewed by me.  Any/all radiology studies have been reviewed by me and discussed with radiologist dictating the report.   EKG's independently viewed and interpreted by me.  Discussion below represents my analysis of pertinent findings related to patient's condition, differential diagnosis, treatment plan and final disposition.      ED Course as of Apr 05 1742   Mon Apr 05, 2021   1708 He was able to void a small amount (approximately 150 cc) prior to Hopkins catheter was placed.  Will discuss with urology.    [WC]   1719 For comfortable after a small dose of pain medication and replacement of Hopkins catheter.  Blood pressure is 163/89.  Discussed with Dr. Velasco-he wants to leave the catheter in for now and will see the patient in follow-up.    [WC]      ED Course User Index  [WC] Jose Ramon Elliott MD       AS OF 17:42 EDT VITALS:    BP - (!) 200/94  HR - 68  TEMP - 97.6 °F (36.4 °C) (Tympanic)  02 SATS - 98%        DIAGNOSIS  Final diagnoses:   Urinary retention   Painful bladder spasm         DISPOSITION  Discharged           Jose Ramon Elliott MD  04/05/21 1742

## 2021-04-05 NOTE — TELEPHONE ENCOUNTER
Attempted to contact patient. Voicemail left requesting a call back for the results. Will continue to try and reach patient.      Neida Goel RN  Triage [facility]    ----- Message from LEIDY Mejía sent at 4/5/2021  7:43 AM EDT -----  Please let him know echo shows no significant change from prior in 2019. I will further discuss during office visit next week

## 2021-04-05 NOTE — ED NOTES
Pt presents to ED via EMS from home. Pt reports he had light cath removed around 1000. Pt reports since he has been unable to void or have a bowel movement. Pt reports increased pain and swelling at the insertion site. Pt is A&OX4, able to ambulate with assistance, and in a mask at this time.      Humberto Avila, RN  04/05/21 5883

## 2021-04-05 NOTE — ED NOTES
Bladder scan revealed 212mL, verbal orders to place light cath. MD Elliott at bedside at this time.      Shayna Mackenzie, RN  04/05/21 9172

## 2021-04-06 ENCOUNTER — PATIENT OUTREACH (OUTPATIENT)
Dept: CASE MANAGEMENT | Facility: OTHER | Age: 71
End: 2021-04-06

## 2021-04-06 LAB
AORTIC ARCH: 1.6 CM
ASCENDING AORTA: 3 CM
BH CV ECHO MEAS - ACS: 1.2 CM
BH CV ECHO MEAS - AO MAX PG (FULL): 5.2 MMHG
BH CV ECHO MEAS - AO MAX PG: 7.7 MMHG
BH CV ECHO MEAS - AO MEAN PG (FULL): 2 MMHG
BH CV ECHO MEAS - AO MEAN PG: 4 MMHG
BH CV ECHO MEAS - AO ROOT AREA (BSA CORRECTED): 1.6
BH CV ECHO MEAS - AO ROOT AREA: 9.6 CM^2
BH CV ECHO MEAS - AO ROOT DIAM: 3.5 CM
BH CV ECHO MEAS - AO V2 MAX: 139 CM/SEC
BH CV ECHO MEAS - AO V2 MEAN: 96 CM/SEC
BH CV ECHO MEAS - AO V2 VTI: 28.1 CM
BH CV ECHO MEAS - ASC AORTA: 3 CM
BH CV ECHO MEAS - AVA(I,A): 1.9 CM^2
BH CV ECHO MEAS - AVA(I,D): 1.9 CM^2
BH CV ECHO MEAS - AVA(V,A): 1.6 CM^2
BH CV ECHO MEAS - AVA(V,D): 1.6 CM^2
BH CV ECHO MEAS - BSA(HAYCOCK): 2.2 M^2
BH CV ECHO MEAS - BSA: 2.1 M^2
BH CV ECHO MEAS - BZI_BMI: 31.5 KILOGRAMS/M^2
BH CV ECHO MEAS - BZI_METRIC_HEIGHT: 175.3 CM
BH CV ECHO MEAS - BZI_METRIC_WEIGHT: 96.6 KG
BH CV ECHO MEAS - EDV(MOD-SP2): 79 ML
BH CV ECHO MEAS - EDV(MOD-SP4): 66 ML
BH CV ECHO MEAS - EDV(TEICH): 58.1 ML
BH CV ECHO MEAS - EF(CUBED): 72.7 %
BH CV ECHO MEAS - EF(MOD-BP): 51.7 %
BH CV ECHO MEAS - EF(MOD-SP2): 54.4 %
BH CV ECHO MEAS - EF(MOD-SP4): 51.5 %
BH CV ECHO MEAS - EF(TEICH): 65.3 %
BH CV ECHO MEAS - ESV(MOD-SP2): 36 ML
BH CV ECHO MEAS - ESV(MOD-SP4): 32 ML
BH CV ECHO MEAS - ESV(TEICH): 20.2 ML
BH CV ECHO MEAS - FS: 35.1 %
BH CV ECHO MEAS - IVS/LVPW: 1.1
BH CV ECHO MEAS - IVSD: 1.6 CM
BH CV ECHO MEAS - LAT PEAK E' VEL: 3.2 CM/SEC
BH CV ECHO MEAS - LV DIASTOLIC VOL/BSA (35-75): 31.1 ML/M^2
BH CV ECHO MEAS - LV MASS(C)D: 220.1 GRAMS
BH CV ECHO MEAS - LV MASS(C)DI: 103.7 GRAMS/M^2
BH CV ECHO MEAS - LV MAX PG: 2.5 MMHG
BH CV ECHO MEAS - LV MEAN PG: 2 MMHG
BH CV ECHO MEAS - LV SYSTOLIC VOL/BSA (12-30): 15.1 ML/M^2
BH CV ECHO MEAS - LV V1 MAX: 79 CM/SEC
BH CV ECHO MEAS - LV V1 MEAN: 58.3 CM/SEC
BH CV ECHO MEAS - LV V1 VTI: 18.5 CM
BH CV ECHO MEAS - LVIDD: 3.7 CM
BH CV ECHO MEAS - LVIDS: 2.4 CM
BH CV ECHO MEAS - LVLD AP2: 7.6 CM
BH CV ECHO MEAS - LVLD AP4: 7.2 CM
BH CV ECHO MEAS - LVLS AP2: 7.4 CM
BH CV ECHO MEAS - LVLS AP4: 6.4 CM
BH CV ECHO MEAS - LVOT AREA (M): 2.8 CM^2
BH CV ECHO MEAS - LVOT AREA: 2.8 CM^2
BH CV ECHO MEAS - LVOT DIAM: 1.9 CM
BH CV ECHO MEAS - LVPWD: 1.5 CM
BH CV ECHO MEAS - MED PEAK E' VEL: 3.1 CM/SEC
BH CV ECHO MEAS - MR MAX PG: 42 MMHG
BH CV ECHO MEAS - MR MAX VEL: 324 CM/SEC
BH CV ECHO MEAS - MV A DUR: 0.14 SEC
BH CV ECHO MEAS - MV A MAX VEL: 55.7 CM/SEC
BH CV ECHO MEAS - MV DEC SLOPE: 281 CM/SEC^2
BH CV ECHO MEAS - MV DEC TIME: 0.31 SEC
BH CV ECHO MEAS - MV E MAX VEL: 90.4 CM/SEC
BH CV ECHO MEAS - MV E/A: 1.6
BH CV ECHO MEAS - MV MAX PG: 4.8 MMHG
BH CV ECHO MEAS - MV MEAN PG: 2 MMHG
BH CV ECHO MEAS - MV P1/2T MAX VEL: 105 CM/SEC
BH CV ECHO MEAS - MV P1/2T: 109.4 MSEC
BH CV ECHO MEAS - MV V2 MAX: 109 CM/SEC
BH CV ECHO MEAS - MV V2 MEAN: 61.7 CM/SEC
BH CV ECHO MEAS - MV V2 VTI: 35.6 CM
BH CV ECHO MEAS - MVA P1/2T LCG: 2.1 CM^2
BH CV ECHO MEAS - MVA(P1/2T): 2 CM^2
BH CV ECHO MEAS - MVA(VTI): 1.5 CM^2
BH CV ECHO MEAS - PA ACC TIME: 0.16 SEC
BH CV ECHO MEAS - PA MAX PG (FULL): 1.7 MMHG
BH CV ECHO MEAS - PA MAX PG: 2.5 MMHG
BH CV ECHO MEAS - PA PR(ACCEL): 6.6 MMHG
BH CV ECHO MEAS - PA V2 MAX: 78.7 CM/SEC
BH CV ECHO MEAS - PULM A REVS DUR: 0.09 SEC
BH CV ECHO MEAS - PULM A REVS VEL: 22.6 CM/SEC
BH CV ECHO MEAS - PULM DIAS VEL: 75.8 CM/SEC
BH CV ECHO MEAS - PULM S/D: 0.54
BH CV ECHO MEAS - PULM SYS VEL: 40.8 CM/SEC
BH CV ECHO MEAS - PVA(V,A): 2.1 CM^2
BH CV ECHO MEAS - PVA(V,D): 2.1 CM^2
BH CV ECHO MEAS - QP/QS: 0.82
BH CV ECHO MEAS - RAP SYSTOLE: 8 MMHG
BH CV ECHO MEAS - RV MAX PG: 0.74 MMHG
BH CV ECHO MEAS - RV MEAN PG: 0 MMHG
BH CV ECHO MEAS - RV V1 MAX: 43.1 CM/SEC
BH CV ECHO MEAS - RV V1 MEAN: 29.9 CM/SEC
BH CV ECHO MEAS - RV V1 VTI: 11.3 CM
BH CV ECHO MEAS - RVOT AREA: 3.8 CM^2
BH CV ECHO MEAS - RVOT DIAM: 2.2 CM
BH CV ECHO MEAS - RVSP: 70.1 MMHG
BH CV ECHO MEAS - SI(AO): 127.4 ML/M^2
BH CV ECHO MEAS - SI(CUBED): 17.4 ML/M^2
BH CV ECHO MEAS - SI(LVOT): 24.7 ML/M^2
BH CV ECHO MEAS - SI(MOD-SP2): 20.3 ML/M^2
BH CV ECHO MEAS - SI(MOD-SP4): 16 ML/M^2
BH CV ECHO MEAS - SI(TEICH): 17.9 ML/M^2
BH CV ECHO MEAS - SUP REN AO DIAM: 1.9 CM
BH CV ECHO MEAS - SV(AO): 270.4 ML
BH CV ECHO MEAS - SV(CUBED): 36.8 ML
BH CV ECHO MEAS - SV(LVOT): 52.5 ML
BH CV ECHO MEAS - SV(MOD-SP2): 43 ML
BH CV ECHO MEAS - SV(MOD-SP4): 34 ML
BH CV ECHO MEAS - SV(RVOT): 43 ML
BH CV ECHO MEAS - SV(TEICH): 38 ML
BH CV ECHO MEAS - TAPSE (>1.6): 1.3 CM
BH CV ECHO MEAS - TR MAX VEL: 394 CM/SEC
BH CV ECHO MEASUREMENTS AVERAGE E/E' RATIO: 28.7
BH CV XLRA - RV BASE: 2.9 CM
BH CV XLRA - RV LENGTH: 8.2 CM
BH CV XLRA - RV MID: 3.3 CM
BH CV XLRA - TDI S': 8.4 CM/SEC
LEFT ATRIUM VOLUME INDEX: 23 ML/M2
MAXIMAL PREDICTED HEART RATE: 148 BPM
SINUS: 3 CM
STJ: 3.4 CM
STRESS TARGET HR: 126 BPM

## 2021-04-06 NOTE — OUTREACH NOTE
"Patient Outreach Note    Spoke with patient's wife and reports patient complaining of nausea, urinary urgency, and bright red blood in bag post insertion of light cath in ED yesterday.  She has contacted Dr. Velasco's office and appt scheduled for 3:30 PM this afternoon. Discussion in detail multiple rounds of antibiotics for UTI, has never been told of a diagnosis of enlarged prostate, and reports normal PSA's in the past.  Voiced some concern of \"fecal specks,\" noted in toilet when patient attempted voiding after catheter removed yesterday.  Has history of colon polyps and states coloscopy is due and would schedule with their friend, Dr. Ball, when current issues resolved.  Discussed use of probiotics and adding yogurt to diet to replenish normal eder.  Aware to contact me as needed.     Johan Dillard RN  Ambulatory     4/6/2021, 10:22 EDT      "

## 2021-04-07 ENCOUNTER — TELEPHONE (OUTPATIENT)
Dept: FAMILY MEDICINE CLINIC | Facility: CLINIC | Age: 71
End: 2021-04-07

## 2021-04-07 ENCOUNTER — TELEPHONE (OUTPATIENT)
Dept: CARDIOLOGY | Facility: CLINIC | Age: 71
End: 2021-04-07

## 2021-04-07 NOTE — TELEPHONE ENCOUNTER
Also call his cardiologist and see what they recommend regarding the fluid retention.  They may want to increase the Lasix but I will defer to them.

## 2021-04-07 NOTE — TELEPHONE ENCOUNTER
Call the urologist today and get his input first.  I would continue his water pill to control his blood pressure.  However I would send him to the emergency room should his condition continued to deteriorate.

## 2021-04-07 NOTE — TELEPHONE ENCOUNTER
PATIENTS WIFE JANAE CALLING IN BECAUSE PATIENT HAD A CATHETER AND WAS HAVING CONSTANT UTI'S  SO THEY TOOK THE CATHETER OUT Monday MORNING AND HE STARTED HAVING SEVERE LOWER ABDOMEN PAIN, WENT TOO THE ER AND THEY STATED HE WAS HAVING BLADDER SPASMS AND THEY REPLACED THE CATHETER, HIS BLOOD PRESSURE IS STAYING VERY HIGH HE IS NOW RETAINING FLUID TO THE POINT OF IT SEEPING OUT OF HIS SKIN ON HIS LEGS. HE IS IN SEVERE PAIN, HE IS NOW BECOMING CONGESTED LIKE... JANAE CAN'T GET HIM INTO HIS URINOLOGIST UNTIL THE 04/14 BUT SOMETHING NEEDS DONE NOW, HE IS IN AWFUL CONDITION. SHE GAVE HIM HYDROCHLOROTHIAZIDE AND IT BROUGHT HIS BP DOWN YESTERDAY BUT SHE DOESN'T KNOW IF ITS SAFE TO CONTINUE GIVING IT TO HIM WITH ALL HIS FLUID RETENTION. DOES SHE NEED TO TAKE HIM BACK TO HOSPITAL OR WHAT??? THERE ARE NOT APTS AVAILABLE FOR ANYONE IN OFFICE TODAY... PLEASE HAVE DR. TRAN CALL PATIENTS WIFE JANAE...      HE HAS PASSED 400CC MONDAY, 300CC TUESDAY, 250CC TUESDAY, 100CC TODAY    JANAE CAN BE REACHED AT: [phone]

## 2021-04-07 NOTE — TELEPHONE ENCOUNTER
Angelica please let them know Melatonin is ok. Do not exceed 10 mg nightly.  I do not prescribe sleep aids though

## 2021-04-07 NOTE — TELEPHONE ENCOUNTER
I called and spoke with Beny. They are going to come in Friday at 11:30am to see you sooner. However, she was wondering if there is anything you would recommend in the meantime for him to get some sleep. She said he has not been asleep and is scared to go to sleep. He takes Melatonin regularly.     Thanks  Lacey

## 2021-04-07 NOTE — TELEPHONE ENCOUNTER
S/W Beny. Ok to give one additional 40 mg tablet x 1 dose. I asked that they continue 60 mg twice daily and start weighing and keep a list.       They are asking for a sooner appt. Please call to discuss

## 2021-04-07 NOTE — TELEPHONE ENCOUNTER
Patient's wife called to report patient had uriniary catheter in since January.  He had it removed but then recathed again do to spastic bladder.  Patient's blood pressure yesterday was 160/93 HR 57.  HR is usually in the 70's. He has been drinking a lot of water and eating ice.   She gave patient a Hydralazine 25 mg yesterday and asked if she should give him another today.  Patient would like to go to the hospital to have the fluid taken off.     He is also taking Lasix 60 mg bid, Carvedilol 12.5 mg bid and Tamsulosin 0.4mg bid. / JAY     Please call Beny at [phone]

## 2021-04-09 ENCOUNTER — HOSPITAL ENCOUNTER (OUTPATIENT)
Dept: CARDIOLOGY | Facility: HOSPITAL | Age: 71
Discharge: HOME | End: 2021-04-09
Admitting: NURSE PRACTITIONER

## 2021-04-09 ENCOUNTER — OFFICE VISIT (OUTPATIENT)
Dept: CARDIOLOGY | Facility: CLINIC | Age: 71
End: 2021-04-09

## 2021-04-09 VITALS
SYSTOLIC BLOOD PRESSURE: 150 MMHG | BODY MASS INDEX: 31.45 KG/M2 | HEART RATE: 59 BPM | DIASTOLIC BLOOD PRESSURE: 78 MMHG | HEIGHT: 69 IN

## 2021-04-09 DIAGNOSIS — Z51.81 ENCOUNTER FOR THERAPEUTIC DRUG LEVEL MONITORING: ICD-10-CM

## 2021-04-09 DIAGNOSIS — R06.09 DOE (DYSPNEA ON EXERTION): Primary | ICD-10-CM

## 2021-04-09 DIAGNOSIS — I50.32 CHRONIC DIASTOLIC CHF (CONGESTIVE HEART FAILURE) (HCC): ICD-10-CM

## 2021-04-09 DIAGNOSIS — I27.20 PULMONARY HYPERTENSION (HCC): ICD-10-CM

## 2021-04-09 LAB
ALBUMIN SERPL-MCNC: 3.6 G/DL (ref 3.5–5.2)
ALBUMIN/GLOB SERPL: 1 G/DL
ALP SERPL-CCNC: 364 U/L (ref 39–117)
ALT SERPL W P-5'-P-CCNC: 21 U/L (ref 1–41)
ANION GAP SERPL CALCULATED.3IONS-SCNC: 10.6 MMOL/L (ref 5–15)
AST SERPL-CCNC: 33 U/L (ref 1–40)
BILIRUB SERPL-MCNC: 2.5 MG/DL (ref 0–1.2)
BUN SERPL-MCNC: 32 MG/DL (ref 8–23)
BUN/CREAT SERPL: 18.6 (ref 7–25)
CALCIUM SPEC-SCNC: 9.2 MG/DL (ref 8.6–10.5)
CHLORIDE SERPL-SCNC: 98 MMOL/L (ref 98–107)
CO2 SERPL-SCNC: 30.4 MMOL/L (ref 22–29)
CREAT SERPL-MCNC: 1.72 MG/DL (ref 0.76–1.27)
GFR SERPL CREATININE-BSD FRML MDRD: 39 ML/MIN/1.73
GLOBULIN UR ELPH-MCNC: 3.5 GM/DL
GLUCOSE SERPL-MCNC: 177 MG/DL (ref 65–99)
NT-PROBNP SERPL-MCNC: 6477 PG/ML (ref 0–900)
POTASSIUM SERPL-SCNC: 3.7 MMOL/L (ref 3.5–5.2)
PROT SERPL-MCNC: 7.1 G/DL (ref 6–8.5)
SODIUM SERPL-SCNC: 139 MMOL/L (ref 136–145)

## 2021-04-09 PROCEDURE — 99215 OFFICE O/P EST HI 40 MIN: CPT | Performed by: NURSE PRACTITIONER

## 2021-04-09 PROCEDURE — 36415 COLL VENOUS BLD VENIPUNCTURE: CPT

## 2021-04-09 PROCEDURE — 80053 COMPREHEN METABOLIC PANEL: CPT | Performed by: NURSE PRACTITIONER

## 2021-04-09 PROCEDURE — 93000 ELECTROCARDIOGRAM COMPLETE: CPT | Performed by: NURSE PRACTITIONER

## 2021-04-09 PROCEDURE — 83880 ASSAY OF NATRIURETIC PEPTIDE: CPT | Performed by: NURSE PRACTITIONER

## 2021-04-09 NOTE — PROGRESS NOTES
Date of Office Visit: 21  Encounter Provider: LEIDY Mejía  Place of Service: [facility] CARDIOLOGY  Patient Name: Dilip Verma  :1950    Chief Complaint   Patient presents with   • Acute on chronic diastolic CHF (congestive heart failure) (C   • Coronary artery disease involving native coronary artery of    • Follow-up   :     HPI: Dilip Verma is a 71 y.o. male  with history of  hypertension, hyperlipidemia, diabetes mellitus, coronary artery disease, congestive heart failure, thalamic hemorrhage, pulmonary hypertension, and obstructive sleep apnea. He is followed by Dr. Hylton. I will see him in follow up today.      He was admitted to [facility] in 2019 for syncope.  He was orthostatic with supine hypertension.  Clonidine was discontinued and hydralazine was increased.  He then presented to [facility] on 2019 with uncontrolled blood pressure and blurry vision.  He had an abnormal BNP and troponin.  Echocardiogram showed an ejection fraction of 53%, moderate left ventricular hypertrophy, mild to moderate left atrial enlargement and no significant valvular disease.  He had a Holter monitor which showed nonsustained atrial tachycardia.  Due to his elevated troponin he had heart catheterization which showed normal ejection fraction with multivessel coronary artery disease.  Carotid duplex showed mild bilateral carotid artery stenosis.  Transthoracic echocardiogram showed normal left ventricular systolic function with an ejection fraction of 55%, grade 3 diastolic dysfunction, no significant valvular disease.  He did have severe pulmonary hypertension on that.  He underwent coronary artery bypass grafting with LIMA to LAD, vein graft to obtuse marginal 1, vein graft to distal right coronary artery.  He had some postoperative bradycardia and his beta-blocker was held.  He then had postoperative atrial fibrillation and was discharged on  amiodarone.     He then was readmitted in early November 2019 with chest pain and non-STEMI.  Perfusion stress test suggested inferior wall and lateral wall ischemia.  Echocardiogram showed normal left ventricular systolic function with an EF of 62%, moderate left ventricular hypertrophy, grade 2 diastolic dysfunction and severe pulmonary hypertension.  Heart catheterization completed 11/6/2019 showed widely patent grafts.  His PA pressure was 80/30 with a wedge of 20.  He received IV diureses.  He had increased creatinine and BUN.  He was transition back to p.o. Lasix.  He was to follow-up outpatient with nephrology.  He then was in the emergency department for left shoulder pain and was diagnosed with radiculopathy.  He had some insomnia and sleep disturbance which improved after starting trazodone.  That he was not tolerating BiPAP well.  His nephrologist later stop potassium.  On December 1, 2020 he fell and broke his leg.  He had surgery for that.     He was admitted in December 2020 with E. coli bacteremia and sepsis.  He had some elevated blood pressure and hydralazine was adjusted.  Carvedilol was adjusted.  We visit today via telephone.  Patient's wife is providing majority of the history but I am on speaker and patient is in the background.  After discharge his systolic blood pressure was getting to 100.  They discussed this with his PCP who suggested holding hydralazine altogether and increasing carvedilol from 6.25 mg to 12.5 mg twice daily.  They have done that for the last several days.  His blood pressure this morning was 160 systolic. That was before carvedilol.  His heart rate has been in the 60s.    We visit today for reassessment.  He had an echo recently which showed ejection fraction was normal grade 2 diastolic dysfunction and RVSP 70 mmHg he had some urinary retention and had a catheter that was removed but then urinary retention so now he has another catheter.  He gave 1 additional dose of  "furosemide 40 mg earlier this week.  He has been taking furosemide 60 mg twice daily.  His weights have not been able to be checked.  He had injured his left foot or ankle.  He just got released to bear weight so they have not been standing on the scale for that reason.  He is in a wheelchair today.  He unfortunately had a fall on the way to our office getting into the car from his house.  Their neighbor had to help him.  He has been much more short of breath than usual also with increased lower extremity swelling.  He is not had any labs since the last time his Lasix was increased.    Allergies   Allergen Reactions   • Ace Inhibitors Angioedema   • Clonidine Derivatives Unknown - High Severity     Passes out   • Losartan Angioedema     Angioneurotic edema   • Amlodipine Swelling   • Ativan [Lorazepam] Irritability   • Xanax [Alprazolam] Unknown - High Severity   • Minoxidil Confusion   • Tetracycline Other (See Comments)     bliisters in mouth             Family and social history reviewed.     ROS  All other systems were reviewed and are negative          Objective:     Vitals:    04/09/21 1145   BP: 150/78   BP Location: Left arm   Patient Position: Sitting   Pulse: 59   Height: 175.3 cm (69\")     Body mass index is 31.45 kg/m².    PHYSICAL EXAM:  Constitutional:       General: Not in acute distress.     Appearance: Well-developed. Not diaphoretic.   HENT:      Head: Normocephalic.   Pulmonary:      Effort: Pulmonary effort is normal. No respiratory distress.      Breath sounds: Examination of the left-lower field reveals decreased breath sounds. Decreased breath sounds present. No wheezing. No rhonchi. No rales.   Cardiovascular:      Normal rate. Regular rhythm.   Pulses:     Radial: 2+ bilaterally.  Edema:     Peripheral edema absent.      Ankle: bilateral 1+ edema of the ankle.     Feet: 2+ edema of the left foot and 1+ edema of the right foot.  Skin:     General: Skin is warm and dry. There is no cyanosis. "      Findings: No rash.   Neurological:      Mental Status: Alert and oriented to person, place, and time.   Psychiatric:         Behavior: Behavior normal.         Thought Content: Thought content normal.         Judgment: Judgment normal.           ECG 12 Lead    Date/Time: 4/9/2021 6:15 PM  Performed by: Danii Dennison APRN  Authorized by: Danii Dennison APRN   Comparison: compared with previous ECG   Similar to previous ECG  Rhythm: sinus rhythm  Rate: normal  Conduction: right bundle branch block and left posterior fascicular block    Clinical impression: abnormal EKG              Current Outpatient Medications   Medication Sig Dispense Refill   • aspirin 81 MG EC tablet Take 81 mg by mouth Daily.     • carvedilol (COREG) 12.5 MG tablet Take one tablet twice daily with meals 180 tablet 2   • Cholecalciferol (Vitamin D) 125 MCG (5000 UT) capsule capsule Take 5,000 Units by mouth Daily.     • furosemide (LASIX) 40 MG tablet Take 1.5 tablets by mouth 2 (Two) Times a Day.     • hydrALAZINE (APRESOLINE) 25 MG tablet Take 1 tablet by mouth 2 (Two) Times a Day As Needed (for SBP > 160). 90 tablet 0   • Insulin Glargine, 2 Unit Dial, (TOUJEO) 300 UNIT/ML solution pen-injector injection      • insulin lispro (humaLOG) 100 UNIT/ML injection Inject 0-14 Units under the skin into the appropriate area as directed 3 (Three) Times a Day Before Meals.  12   • melatonin 3 MG tablet Take 3 mg by mouth Every Night.     • Oxymetazoline HCl (AFRIN NASAL SPRAY NA) into the nostril(s) as directed by provider.     • sennosides-docusate (senna-docusate sodium) 8.6-50 MG per tablet Take 2 tablets by mouth 2 (two) times a day. 56 tablet 0   • tamsulosin (FLOMAX) 0.4 MG capsule 24 hr capsule TAKE 2 CAPSULES BY MOUTH AT BEDTIME  60 capsule 0     No current facility-administered medications for this visit.     Assessment:       Diagnosis Plan   1. MARTIN (dyspnea on exertion)  proBNP   2. Encounter for therapeutic drug level monitoring   Comprehensive Metabolic Panel        Orders Placed This Encounter   Procedures   • Comprehensive Metabolic Panel     Standing Status:   Future     Number of Occurrences:   1     Standing Expiration Date:   4/10/2022     Order Specific Question:   Release to patient     Answer:   Immediate   • proBNP     Order Specific Question:   Release to patient     Answer:   Immediate         Plan:          1.  71-year-old gentleman with coronary artery disease status post coronary artery bypass grafting times 3 July 2019 with LIMA to LAD, vein graft to obtuse marginal 1, vein graft to distal right coronary artery with normal left ventricular systolic function.  Then non-STEMI and  inferolateral wall ischemia on perfusion stress test.  Cardiac catheterization 11/2019 showed patent grafts normal left ventricular systolic function.  -No angina   2.  Hypertension .   Blood pressure is a little elevated but he is in obvious fluid overload  3.  Hyperlipidemia- unclear why he is not on statin therapy  4.  Diabetes mellitus followed by PCP  5.  Obstructive sleep apnea still having difficulty tolerating BiPAP.  Follows with Dr. Cai   6.  Pulmonary hypertension RVSP 70 mmHg  7.  Postoperative atrial fibrillation- no known recurrence  8.  Bilateral carotid artery stenosis mild on duplex July 2019  9.  Grade 3 diastolic dysfunction on echocardiogram July 2019 but was grade 2 in April 2020  10. Remote history of thalamic hemorrhage noted on MRI July 2019  11. History of hypogonadism with prior testosterone replacement treatment.    12. Chronic diastolic congestive heart failure.  I evaluated him further in our cardiac evaluation clinic.  proBNP is elevated more than prior.  His renal function has declined in the last 3 weeks.  I tried to admit him from the office today but beds are on hold.  He did not want to go through the ER and has requested a scheduled admission early next week.  We will try for Monday.  Wife verbalized  understanding if he should worsen over the weekend to take him to the ER.  For now he will continue on furosemide 60 mg twice daily.  All other medications will stay the same  13. Chronic kidney disease follows with nephrology-as above.  Will need to consider consulting nephrology and patient  14.  Urinary retention now with another indwelling catheter following with urology he has had issues with recurrent UTI              It has been a pleasure to participate in this patient's care.      Thank you,  LEIDY Mejía      **I used Dragon to dictate this note:**

## 2021-04-12 ENCOUNTER — HOSPITAL ENCOUNTER (INPATIENT)
Facility: HOSPITAL | Age: 71
LOS: 11 days | Discharge: NURSING, REHAB OR LONG-TERM CARE | End: 2021-04-23
Attending: INTERNAL MEDICINE | Admitting: INTERNAL MEDICINE

## 2021-04-12 ENCOUNTER — APPOINTMENT (OUTPATIENT)
Dept: GENERAL RADIOLOGY | Facility: HOSPITAL | Age: 71
End: 2021-04-12

## 2021-04-12 PROBLEM — S72.145A CLOSED NONDISPLACED INTERTROCHANTERIC FRACTURE OF LEFT FEMUR: Status: RESOLVED | Noted: 2020-12-01 | Resolved: 2021-04-12

## 2021-04-12 PROBLEM — I50.33 ACUTE ON CHRONIC DIASTOLIC (CONGESTIVE) HEART FAILURE: Status: ACTIVE | Noted: 2021-04-12

## 2021-04-12 PROBLEM — N18.9 ACUTE ON CHRONIC RENAL FAILURE (HCC): Status: ACTIVE | Noted: 2020-12-03

## 2021-04-12 PROBLEM — N17.9 AKI (ACUTE KIDNEY INJURY): Status: RESOLVED | Noted: 2020-12-03 | Resolved: 2021-04-12

## 2021-04-12 PROBLEM — I50.33 ACUTE ON CHRONIC DIASTOLIC CHF (CONGESTIVE HEART FAILURE): Status: ACTIVE | Noted: 2019-09-11

## 2021-04-12 PROBLEM — N39.0 COMPLICATED UTI (URINARY TRACT INFECTION): Status: RESOLVED | Noted: 2020-12-16 | Resolved: 2021-04-12

## 2021-04-12 PROBLEM — D62 ACUTE POSTHEMORRHAGIC ANEMIA: Status: RESOLVED | Noted: 2020-12-04 | Resolved: 2021-04-12

## 2021-04-12 LAB
ALBUMIN SERPL-MCNC: 3.3 G/DL (ref 3.5–5.2)
ALBUMIN/GLOB SERPL: 0.9 G/DL
ALP SERPL-CCNC: 325 U/L (ref 39–117)
ALT SERPL W P-5'-P-CCNC: 23 U/L (ref 1–41)
ANION GAP SERPL CALCULATED.3IONS-SCNC: 11.3 MMOL/L (ref 5–15)
AST SERPL-CCNC: 29 U/L (ref 1–40)
BACTERIA UR QL AUTO: ABNORMAL /HPF
BASOPHILS # BLD AUTO: 0.04 10*3/MM3 (ref 0–0.2)
BASOPHILS NFR BLD AUTO: 0.6 % (ref 0–1.5)
BILIRUB SERPL-MCNC: 2.5 MG/DL (ref 0–1.2)
BILIRUB UR QL STRIP: NEGATIVE
BUN SERPL-MCNC: 31 MG/DL (ref 8–23)
BUN/CREAT SERPL: 20.5 (ref 7–25)
CALCIUM SPEC-SCNC: 8.4 MG/DL (ref 8.6–10.5)
CHLORIDE SERPL-SCNC: 101 MMOL/L (ref 98–107)
CLARITY UR: CLEAR
CO2 SERPL-SCNC: 27.7 MMOL/L (ref 22–29)
COLOR UR: YELLOW
CREAT SERPL-MCNC: 1.51 MG/DL (ref 0.76–1.27)
CREAT UR-MCNC: 28.1 MG/DL
CREAT UR-MCNC: 28.1 MG/DL
DEPRECATED RDW RBC AUTO: 46.9 FL (ref 37–54)
EOSINOPHIL # BLD AUTO: 0.31 10*3/MM3 (ref 0–0.4)
EOSINOPHIL NFR BLD AUTO: 4.8 % (ref 0.3–6.2)
ERYTHROCYTE [DISTWIDTH] IN BLOOD BY AUTOMATED COUNT: 14.2 % (ref 12.3–15.4)
GFR SERPL CREATININE-BSD FRML MDRD: 46 ML/MIN/1.73
GLOBULIN UR ELPH-MCNC: 3.6 GM/DL
GLUCOSE BLDC GLUCOMTR-MCNC: 187 MG/DL (ref 70–130)
GLUCOSE SERPL-MCNC: 177 MG/DL (ref 65–99)
GLUCOSE UR STRIP-MCNC: NEGATIVE MG/DL
HBA1C MFR BLD: 7.6 % (ref 4.8–5.6)
HCT VFR BLD AUTO: 38.5 % (ref 37.5–51)
HGB BLD-MCNC: 12.9 G/DL (ref 13–17.7)
HGB UR QL STRIP.AUTO: ABNORMAL
HYALINE CASTS UR QL AUTO: ABNORMAL /LPF
IMM GRANULOCYTES # BLD AUTO: 0.01 10*3/MM3 (ref 0–0.05)
IMM GRANULOCYTES NFR BLD AUTO: 0.2 % (ref 0–0.5)
KETONES UR QL STRIP: NEGATIVE
LEUKOCYTE ESTERASE UR QL STRIP.AUTO: ABNORMAL
LYMPHOCYTES # BLD AUTO: 0.99 10*3/MM3 (ref 0.7–3.1)
LYMPHOCYTES NFR BLD AUTO: 15.4 % (ref 19.6–45.3)
MCH RBC QN AUTO: 30.6 PG (ref 26.6–33)
MCHC RBC AUTO-ENTMCNC: 33.5 G/DL (ref 31.5–35.7)
MCV RBC AUTO: 91.2 FL (ref 79–97)
MONOCYTES # BLD AUTO: 0.59 10*3/MM3 (ref 0.1–0.9)
MONOCYTES NFR BLD AUTO: 9.2 % (ref 5–12)
NEUTROPHILS NFR BLD AUTO: 4.5 10*3/MM3 (ref 1.7–7)
NEUTROPHILS NFR BLD AUTO: 69.8 % (ref 42.7–76)
NITRITE UR QL STRIP: POSITIVE
NRBC BLD AUTO-RTO: 0 /100 WBC (ref 0–0.2)
NT-PROBNP SERPL-MCNC: 6227 PG/ML (ref 0–900)
PH UR STRIP.AUTO: 6.5 [PH] (ref 5–8)
PLATELET # BLD AUTO: 199 10*3/MM3 (ref 140–450)
PMV BLD AUTO: 11.2 FL (ref 6–12)
POTASSIUM SERPL-SCNC: 3.7 MMOL/L (ref 3.5–5.2)
PROT SERPL-MCNC: 6.9 G/DL (ref 6–8.5)
PROT UR QL STRIP: NEGATIVE
PROT UR-MCNC: 13 MG/DL
PROT UR-MCNC: 13 MG/DL
PROT/CREAT UR: 462.6 MG/G CREA (ref 0–200)
RBC # BLD AUTO: 4.22 10*6/MM3 (ref 4.14–5.8)
RBC # UR: ABNORMAL /HPF
REF LAB TEST METHOD: ABNORMAL
SODIUM SERPL-SCNC: 140 MMOL/L (ref 136–145)
SP GR UR STRIP: 1.01 (ref 1–1.03)
SQUAMOUS #/AREA URNS HPF: ABNORMAL /HPF
UROBILINOGEN UR QL STRIP: ABNORMAL
WBC # BLD AUTO: 6.44 10*3/MM3 (ref 3.4–10.8)
WBC UR QL AUTO: ABNORMAL /HPF

## 2021-04-12 PROCEDURE — 82570 ASSAY OF URINE CREATININE: CPT | Performed by: NURSE PRACTITIONER

## 2021-04-12 PROCEDURE — 83036 HEMOGLOBIN GLYCOSYLATED A1C: CPT | Performed by: INTERNAL MEDICINE

## 2021-04-12 PROCEDURE — 85025 COMPLETE CBC W/AUTO DIFF WBC: CPT | Performed by: INTERNAL MEDICINE

## 2021-04-12 PROCEDURE — 80053 COMPREHEN METABOLIC PANEL: CPT | Performed by: INTERNAL MEDICINE

## 2021-04-12 PROCEDURE — G0378 HOSPITAL OBSERVATION PER HR: HCPCS

## 2021-04-12 PROCEDURE — 84156 ASSAY OF PROTEIN URINE: CPT | Performed by: NURSE PRACTITIONER

## 2021-04-12 PROCEDURE — 83880 ASSAY OF NATRIURETIC PEPTIDE: CPT | Performed by: INTERNAL MEDICINE

## 2021-04-12 PROCEDURE — 82962 GLUCOSE BLOOD TEST: CPT

## 2021-04-12 PROCEDURE — 99220 PR INITIAL OBSERVATION CARE/DAY 70 MINUTES: CPT | Performed by: INTERNAL MEDICINE

## 2021-04-12 PROCEDURE — 25010000002 ENOXAPARIN PER 10 MG: Performed by: INTERNAL MEDICINE

## 2021-04-12 PROCEDURE — 81001 URINALYSIS AUTO W/SCOPE: CPT | Performed by: NURSE PRACTITIONER

## 2021-04-12 PROCEDURE — 71046 X-RAY EXAM CHEST 2 VIEWS: CPT

## 2021-04-12 RX ORDER — SODIUM CHLORIDE 0.9 % (FLUSH) 0.9 %
10 SYRINGE (ML) INJECTION AS NEEDED
Status: DISCONTINUED | OUTPATIENT
Start: 2021-04-12 | End: 2021-04-23 | Stop reason: HOSPADM

## 2021-04-12 RX ORDER — AMOXICILLIN 250 MG
2 CAPSULE ORAL 2 TIMES DAILY PRN
Status: DISCONTINUED | OUTPATIENT
Start: 2021-04-12 | End: 2021-04-23 | Stop reason: HOSPADM

## 2021-04-12 RX ORDER — TAMSULOSIN HYDROCHLORIDE 0.4 MG/1
0.4 CAPSULE ORAL 2 TIMES DAILY
Status: DISCONTINUED | OUTPATIENT
Start: 2021-04-12 | End: 2021-04-23 | Stop reason: HOSPADM

## 2021-04-12 RX ORDER — SODIUM CHLORIDE 0.9 % (FLUSH) 0.9 %
10 SYRINGE (ML) INJECTION EVERY 12 HOURS SCHEDULED
Status: DISCONTINUED | OUTPATIENT
Start: 2021-04-12 | End: 2021-04-23 | Stop reason: HOSPADM

## 2021-04-12 RX ORDER — NITROGLYCERIN 0.4 MG/1
0.4 TABLET SUBLINGUAL
Status: DISCONTINUED | OUTPATIENT
Start: 2021-04-12 | End: 2021-04-23 | Stop reason: HOSPADM

## 2021-04-12 RX ORDER — BUMETANIDE 0.25 MG/ML
2 INJECTION INTRAMUSCULAR; INTRAVENOUS EVERY 8 HOURS
Status: DISCONTINUED | OUTPATIENT
Start: 2021-04-12 | End: 2021-04-13

## 2021-04-12 RX ORDER — ASPIRIN 81 MG/1
81 TABLET ORAL DAILY
Status: DISCONTINUED | OUTPATIENT
Start: 2021-04-12 | End: 2021-04-23 | Stop reason: HOSPADM

## 2021-04-12 RX ORDER — ACETAMINOPHEN 325 MG/1
650 TABLET ORAL EVERY 4 HOURS PRN
Status: DISCONTINUED | OUTPATIENT
Start: 2021-04-12 | End: 2021-04-23 | Stop reason: HOSPADM

## 2021-04-12 RX ORDER — CARVEDILOL 12.5 MG/1
12.5 TABLET ORAL 2 TIMES DAILY WITH MEALS
Status: DISCONTINUED | OUTPATIENT
Start: 2021-04-12 | End: 2021-04-23 | Stop reason: HOSPADM

## 2021-04-12 RX ADMIN — ENOXAPARIN SODIUM 40 MG: 40 INJECTION SUBCUTANEOUS at 18:19

## 2021-04-12 RX ADMIN — TAMSULOSIN HYDROCHLORIDE 0.4 MG: 0.4 CAPSULE ORAL at 20:51

## 2021-04-12 RX ADMIN — SODIUM CHLORIDE, PRESERVATIVE FREE 10 ML: 5 INJECTION INTRAVENOUS at 20:51

## 2021-04-12 RX ADMIN — ASPIRIN 81 MG: 81 TABLET, COATED ORAL at 18:18

## 2021-04-12 RX ADMIN — CARVEDILOL 12.5 MG: 12.5 TABLET, FILM COATED ORAL at 18:18

## 2021-04-12 NOTE — H&P
Date of Hospital Visit: 21  Encounter Provider: Cyril Sanchez MD  Place of Service: [facility] CARDIOLOGY  Patient Name: Dilip Verma  :1950  Referral Provider: Amador Reyes Jr., MD    Chief complaint: shortness of breath, CHF    History of Present Illness:     Mr Verma is a 71 year old man who follows with Dr Reyes.    He has a previous history of left thalamic stroke.  He has longstanding insulin-dependent diabetes.  His coronary artery disease and underwent coronary artery bypass grafting in 2019.  He has chronic diastolic congestive heart failure and severe pulmonary hypertension.  His last echocardiogram was performed 10 days ago and revealed low normal left ventricular systolic function, grade 2 diastolic dysfunction, and an RVSP of 70 mmHg.    He has a very longstanding history of severe hypertension.  Secondary causes have been excluded.  He has had some issues with noncompliance with medications in the past.  He cannot weigh himself daily because he is nonweightbearing from recent orthopedic issues.    He is developed progressive volume overload and dyspnea at home.  He was seen in the office 3 days ago and direct admission was recommended, but we had no beds.  He now presents for admission.    He denies chest pain, palpitations, lightheadedness, or syncope.  He checks his blood pressures at home and his systolic pressures are generally in the 140s to 190s.  He has an indwelling catheter and says that he has been told to drink a lot of fluids because of his urinary retention.  He cannot quantify the amount of fluid for me.    Previous Cardiac Testing:    Echocardiogram 21  · Left ventricular ejection fraction appears to be 51 - 55%. Left ventricular systolic function is low normal. Normal left ventricular cavity size noted. Left ventricular wall thickness is consistent with moderate concentric hypertrophy. All left ventricular wall segments  "contract normally. Left ventricular diastolic function is consistent with (grade II w/high LAP) pseudonormalization.  · Calculated right ventricular systolic pressure from tricuspid regurgitation is 70.1 mmHg.    Cardiac Catheterization 11/6/19  SUMMARY: Widely patent bypass grafts. Multivessel native coronary disease. Elevated biventricular pressures  RECOMMENDATIONS: Diuresis, risk factor modification.     Stress Test 11/5/19  · Left ventricular ejection fraction is normal (Calculated EF = 52%).  · Myocardial perfusion imaging indicates a medium-sized, moderately severe area of ischemia located in the inferior wall and lateral wall.    Past Medical History:   Diagnosis Date   • AMENA (acute kidney injury) (CMS/[facility]) 12/3/2020   • Anxiety    • Chronic diastolic (congestive) heart failure (CMS/[facility])    • Chronic kidney disease     stones- had lithotripsy   • CKD (chronic kidney disease) stage 3, GFR 30-59 ml/min (CMS/[facility]) 12/1/2020   • Closed nondisplaced intertrochanteric fracture of left femur (CMS/[facility]) 12/1/2020   • Colon polyp    • Coronary artery disease     CABG 7/2019   • Diabetes mellitus, type II (CMS/[facility])    • Erectile dysfunction    • H/O bone density study never   • Hyperlipidemia    • Hypersomnia    • Hypertension    • Kidney stone    • Orthostasis    • Periodic breathing    • Routine eye exam 2015   • Sleep apnea     bipap   • Stroke (CMS/[facility])     \"slight stroke\"       Past Surgical History:   Procedure Laterality Date   • CARDIAC CATHETERIZATION N/A 7/15/2019    Procedure: Coronary angiography;  Surgeon: Carrie Price MD;  Location: [facility] CATH INVASIVE LOCATION;  Service: Cardiovascular   • CARDIAC CATHETERIZATION N/A 7/15/2019    Procedure: Left Heart Cath;  Surgeon: Carrie Price MD;  Location[facility] CATH [facility]VAS[facility];  Service: Cardiovascular   • CARDIAC CATHETERIZATION N/A 7/15/2019    Procedure: Left ventriculography;  Surgeon: Carrie Price MD;  Location: [facility] CATH INVASIVE " LOCATION;  Service: Cardiovascular   • CARDIAC CATHETERIZATION  7/15/2019    Procedure: Functional Flow [place];  Surgeon: Carrie Price MD;  Location:  [facility] CATH INVASIVE LOCATION;  Service: Cardiovascular   • CARDIAC CATHETERIZATION N/A 11/6/2019    Procedure: Right and Left Heart Cath;  Surgeon: Mya Smith MD;  Location:  [facility] CATH INVASIVE LOCATION;  Service: Cardiovascular   • CARDIAC CATHETERIZATION N/A 11/6/2019    Procedure: Coronary angiography;  Surgeon: Mya Smith MD;  Location:  [facility] CATH INVASIVE LOCATION;  Service: Cardiovascular   • CATARACT EXTRACTION EXTRACAPSULAR W/ INTRAOCULAR LENS IMPLANTATION Bilateral    • COLONOSCOPY  01/2015    dr [email or web address]   • CORONARY ARTERY BYPASS GRAFT N/A 7/18/2019    Procedure: INTRAOPERATIVE SHOAIB; STERNOTOMY CORONARY ARTERY BYPASS x 3  USING LEFT INTERNAL MAMMARY ARTERY GRAFT UTILIZING ENDOSCOPICALLY HARVESTED RIGHT GREATER SAPHENOUS VEIN AND PRP.;  Surgeon: Bill Devi MD;  [facility];  Service: Cardiothoracic   • DENTAL PROCEDURE      3 surgeries inder implants   • FEMUR IM NAILING/RODDING Left 12/3/2020    Procedure: LEFT HIP INTRAMEDULLARY NAIL;  Surgeon: Niraj Ravi MD;  Location: [facility] MAIN OR;  Service: Orthopedic Spine;  Laterality: Left;   • HERNIA REPAIR      inguinal bilaterally   • KIDNEY STONE SURGERY      lithotripsy       Prior to Admission medications    Medication Sig Start Date End Date Taking? Authorizing Provider   aspirin 81 MG EC tablet Take 81 mg by mouth Daily.   Yes Heri Rinaldi MD   carvedilol (COREG) 12.5 MG tablet Take one tablet twice daily with meals 3/12/21  Yes Danii Dennison APRN   Cholecalciferol (Vitamin D) 125 MCG (5000 UT) capsule capsule Take 5,000 Units by mouth Daily.   Yes Heri Rinaldi MD   furosemide (LASIX) 40 MG tablet Take 1.5 tablets by mouth 2 (Two) Times a Day. 3/24/21  Yes Danii Dennison APRN   hydrALAZINE (APRESOLINE) 25 MG tablet Take 1 tablet by mouth 2 (Two) Times a  Day As Needed (for SBP > 160). 21  Yes Danii Dennison APRN   Insulin Glargine, 2 Unit Dial, (TOUJEO) 300 UNIT/ML solution pen-injector injection    Yes ProviderHeri MD   insulin lispro (humaLOG) 100 UNIT/ML injection Inject 0-14 Units under the skin into the appropriate area as directed 3 (Three) Times a Day Before Meals. 20  Yes Amador Valverde MD   melatonin 3 MG tablet Take 9 mg by mouth Every Night.   Yes ProviderHeri MD   Oxymetazoline HCl (AFRIN NASAL SPRAY NA) into the nostril(s) as directed by provider.   Yes ProviderHeri MD   tamsulosin (FLOMAX) 0.4 MG capsule 24 hr capsule TAKE 2 CAPSULES BY MOUTH AT BEDTIME   Patient taking differently: Take 1 capsule by mouth 2 (Two) Times a Day. 3/29/21  Yes Leena Bernal APRN   sennosides-docusate (senna-docusate sodium) 8.6-50 MG per tablet Take 2 tablets by mouth 2 (two) times a day. 1/3/21   Candido Arroyo MD       Social History     Socioeconomic History   • Marital status:      Spouse name: Not on file   • Number of children: Not on file   • Years of education: Not on file   • Highest education level: Not on file   Tobacco Use   • Smoking status: Former Smoker     Packs/day: 0.75     Years: 16.00     Pack years: 12.00     Quit date:      Years since quittin.2   • Smokeless tobacco: Never Used   • Tobacco comment: Start age:39/Stopping age:47, 3/4 PPD   Substance and Sexual Activity   • Alcohol use: No     Comment: caffeine use - 1 cup daily   • Drug use: No   • Sexual activity: Defer       Family History   Problem Relation Age of Onset   • Depression Mother    • Cancer Mother         uterine   • Arrhythmia Mother    • Heart disease Father    • Hypertension Father    • Kidney disease Father    • Thyroid disease Father    • Depression Sister    • Alcohol abuse Brother    • Alcohol abuse Other    • Alcohol abuse Other    • Lung disease Other    • Thyroid disease Other    • Glaucoma Other    • Heart attack Other  "   • Stroke Maternal Grandmother    • Stroke Paternal Grandmother        Review of Systems   Constitutional: Positive for fatigue.   Respiratory: Positive for shortness of breath.    Cardiovascular: Positive for leg swelling.   Genitourinary: Positive for difficulty urinating.   Musculoskeletal: Positive for arthralgias and myalgias.   Psychiatric/Behavioral: The patient is nervous/anxious.    All other systems reviewed and are negative.       Objective:     Vitals:    04/12/21 1335   BP: 166/98   BP Location: Right arm   Patient Position: Lying   Pulse: 67   Resp: 18   Temp: 97.6 °F (36.4 °C)   TempSrc: Oral   SpO2: (!) 88%   Weight: 105 kg (231 lb 7.7 oz)   Height: 175.3 cm (69\")     Body mass index is 34.18 kg/m².  Flowsheet Rows      First Filed Value   Admission Height  175.3 cm (69\") Documented at 04/12/2021 1335   Admission Weight  105 kg (231 lb 7.7 oz) Documented at 04/12/2021 1335          Physical Exam  HENT:      Head: Normocephalic.      Nose: Nose normal.      Mouth/Throat:      Pharynx: Oropharynx is clear.   Eyes:      Conjunctiva/sclera: Conjunctivae normal.   Cardiovascular:      Rate and Rhythm: Normal rate and regular rhythm.      Pulses: Normal pulses.   Pulmonary:      Breath sounds: Examination of the right-middle field reveals decreased breath sounds. Examination of the right-lower field reveals decreased breath sounds. Examination of the left-lower field reveals rales. Decreased breath sounds and rales present.   Abdominal:      Palpations: Abdomen is soft.      Tenderness: There is no abdominal tenderness.   Musculoskeletal:         General: Swelling (1-2+ BLE edema) present.   Skin:     General: Skin is warm and dry.   Neurological:      General: No focal deficit present.      Mental Status: He is alert.   Psychiatric:         Mood and Affect: Mood normal.                 Lab Review:                Results from last 7 days   Lab Units 04/09/21  1225   SODIUM mmol/L 139   POTASSIUM mmol/L " 3.7   CHLORIDE mmol/L 98   CO2 mmol/L 30.4*   BUN mg/dL 32*   CREATININE mg/dL 1.72*   GLUCOSE mg/dL 177*   CALCIUM mg/dL 9.2                               Previous EKG 12/16/20      I personally viewed and interpreted the patient's EKG/Telemetry data    Assessment/Plan:     1. Acute on chronic diastolic CHF  2. Chronic right sided CHF/severe PHTN, RVSP 70mm Hg  3. Longstanding severe essential hypertension  4. CAD s/p CABG  5. AMENA on CKD  6. Combined COCO/CSA, on BiPAP (his wife brought his machine from home)   7. IDDM2  8. Urinary retention, history of UTI  9. History of orthopedic fractures/non-weight bearing status    *Start IV bumetanide, follow labs and daily weights  *Does not need repeat TTE  *Consult nephrology  *Check CXR as exam suggests right pleural effusion  *Consult [facility] for DM  *Continue indwelling light, consult Dr Velasco if issues arise  *Consult PT/OT

## 2021-04-12 NOTE — PLAN OF CARE
Goal Outcome Evaluation:  Plan of Care Reviewed With: patient, spouse  Progress: no change  Outcome Summary: New direct admit r/t volume overload and dyspnea. Cardio, Nephro, and internal medicine consulted. urine sample obtained. no complaints at this time. Will CTM the rest of my shift.

## 2021-04-12 NOTE — PROGRESS NOTES
"Pharmacy Consult - Lovenox    Dilip Verma has been consulted for pharmacy to dose enoxaparin for VTE prophylaxis per request of Dr. Sanchez.    Relevant clinical data and objective history reviewed:  71 y.o. male 175.3 cm (69\") 105 kg (231 lb 7.7 oz)      Past Medical History:   Diagnosis Date    AMENA (acute kidney injury) (CMS/[facility]) 12/3/2020    Anxiety     Chronic diastolic (congestive) heart failure (CMS/[facility])     Chronic kidney disease     stones- had lithotripsy    CKD (chronic kidney disease) stage 3, GFR 30-59 ml/min (CMS/HCC) 12/1/2020    Closed nondisplaced intertrochanteric fracture of left femur (CMS/HCC) 12/1/2020    Colon polyp     Coronary artery disease     CABG 7/2019    Diabetes mellitus, type II (CMS/[facility])     Erectile dysfunction     H/O bone density study never    Hyperlipidemia     Hypersomnia     Hypertension     Kidney stone     Orthostasis     Periodic breathing     Routine eye exam 2015    Sleep apnea     bipap    Stroke (CMS/HCC)     \"slight stroke\"     is allergic to ace inhibitors, clonidine derivatives, losartan, amlodipine, ativan [lorazepam], xanax [alprazolam], minoxidil, and tetracycline.    Lab Results   Component Value Date    WBC 6.44 04/12/2021    HGB 12.9 (L) 04/12/2021    HCT 38.5 04/12/2021    MCV 91.2 04/12/2021     04/12/2021     Lab Results   Component Value Date    GLUCOSE 177 (H) 04/12/2021    CALCIUM 8.4 (L) 04/12/2021     04/12/2021    K 3.7 04/12/2021    CO2 27.7 04/12/2021     04/12/2021    BUN 31 (H) 04/12/2021    CREATININE 1.51 (H) 04/12/2021    EGFRIFAFRI 109 10/28/2015    EGFRIFNONA 46 (L) 04/12/2021    BCR 20.5 04/12/2021    ANIONGAP 11.3 04/12/2021       Estimated Creatinine Clearance: 52.8 mL/min (A) (by C-G formula based on SCr of 1.51 mg/dL (H)).      Assessment/Plan    Will start patient on 40 mg subcutaneous every 24 hours. Pharmacy will continue to follow.     Emerald Hampton LINSEY    "

## 2021-04-12 NOTE — CONSULTS
INITIAL CONSULT NOTE      Name: Dilip Verma ADMIT: 2021   : 1950  PCP: Dakota Avila MD    MRN: [id number] LOS: 0 days   AGE/SEX: 71 y.o. male  ROOM: [facility]       Reason for Consult:       AMENA on CKD    Subjective .     History of present illness:  Dilip Verma is a 71 y.o. male  known to our service with h/o CKD stage III, followed by Dr.J Mayen in the office, creatinine noted around 1.3 on 2020, noted trending down to about 2021, creatinine is 1.72 on , admission creatinine 1.51, history of CVA, right residual focal deficit, hypertension, diabetes mellitus  obstructive sleep apnea on CPAP mask, closed nondisplaced intertrochanteric fracture of the left femur, CAD sp coronary artery bypass 2019, chronic diastolic congestive heart failure and severe pulmonary hypertension. Echocardiogram on 2021 with left ventricular ejection fraction appears to be 51 - 55%, diastolic function, RVSP is 70mmHg.     He presented to [facility] cardiology for volume overload and dyspnea three days prior to admission and direct admission was recommended but due to lack of bed availability this did not happen. He returns for admission now. Current labs include BNP 6227, sodium 140, potassium 3.7, carbon dioxide 27, bun 31, creatinine 1.51, calcium 8.4, albumin 3.3, total bilirubin 2.5, white blood cell 6.4, hemoglobin 12.9, platelet 199. Chest xray is pending. Home medications include lasix, flomax, no ace/arb noted. Denies NSAID use. Reports recent UTI difficulty to treat, resistant to multiple antibiotics (Cipro, Amoxicillin, 3rd antibiotic used but unable to recall name). We are seeing the patient for acute kidney injury on chronic kidney disease and congestive heart failure, fluid overload. Information obtained from medical chart, patient, and spouse at bedside.       Review of Systems    Constitutional: + weakness.  HEENT:  No headache, otalgia, itchy eyes, nasal discharge or sore  "throat.  Cardiac:  No chest pain, orthopnea or PND. +dyspnea  Chest:   No cough, phlegm or wheezing.  Abdomen:  No abdominal pain, nausea or vomiting.  Neuro:  No focal weakness, abnormal movements or seizure-like activity.  :   History of difficulty urinating  Ext:   + swelling  ROS was otherwise negative except as mentioned in the [place].     History  Past Medical History:   Diagnosis Date   • AMENA (acute kidney injury) (CMS/HCC) 12/3/2020   • Anxiety    • Chronic diastolic (congestive) heart failure (CMS/HCC)    • Chronic kidney disease     stones- had lithotripsy   • CKD (chronic kidney disease) stage 3, GFR 30-59 ml/min (CMS/HCC) 12/1/2020   • Closed nondisplaced intertrochanteric fracture of left femur (CMS/HCC) 12/1/2020   • Colon polyp    • Coronary artery disease     CABG 7/2019   • Diabetes mellitus, type II (CMS/HCC)    • Erectile dysfunction    • H/O bone density study never   • Hyperlipidemia    • Hypersomnia    • Hypertension    • Kidney stone    • Orthostasis    • Periodic breathing    • Routine eye exam 2015   • Sleep apnea     bipap   • Stroke (CMS/HCC)     \"slight stroke\"   ,   Past Surgical History:   Procedure Laterality Date   • CARDIAC CATHETERIZATION N/A 7/15/2019    Procedure: Coronary angiography;  Surgeon: Carrie Price MD;  Location:  [facility];  Service: Cardiovascular   • CARDIAC CATHETERIZATION N/A 7/15/2019    Procedure: Left Heart Cath;  Surgeon: Carrie Price MD;  Location:  [facility];  Service: Cardiovascular   • CARDIAC CATHETERIZATION N/A 7/15/2019    Procedure: Left ventriculography;  Surgeon: Carrie Price MD;  Location:  [facility];  Service: Cardiovascular   • CARDIAC CATHETERIZATION  7/15/2019    Procedure: Functional Flow [place];  Surgeon: Carrie Price MD;  Location:  [facility];  Service: Cardiovascular   • CARDIAC CATHETERIZATION N/A 11/6/2019    Procedure: Right and Left Heart Cath;  " Surgeon: Mya Smith MD;  Location:  [facility];  Service: Cardiovascular   • CARDIAC CATHETERIZATION N/A 2019    Procedure: Coronary angiography;  Surgeon: Mya Smith MD;  Location:  [facility];  Service: Cardiovascular   • CATARACT EXTRACTION EXTRACAPSULAR W/ INTRAOCULAR LENS IMPLANTATION Bilateral    • COLONOSCOPY  2015    dr [email or web address]   • CORONARY ARTERY BYPASS GRAFT N/A 2019    Procedure: INTRAOPERATIVE SHOAIB; STERNOTOMY CORONARY ARTERY BYPASS x 3  USING LEFT INTERNAL MAMMARY ARTERY GRAFT UTILIZING ENDOSCOPICALLY HARVESTED RIGHT GREATER SAPHENOUS VEIN AND PRP.;  Surgeon: Bill Devi MD;  [facility];  Service: Cardiothoracic   • DENTAL PROCEDURE      3 surgeries inder implants   • FEMUR IM NAILING/RODDING Left 12/3/2020    Procedure: LEFT HIP INTRAMEDULLARY NAIL;  Surgeon: Niraj Ravi MD;  [facility];  Service: Orthopedic Spine;  Laterality: Left;   • HERNIA REPAIR      inguinal bilaterally   • KIDNEY STONE SURGERY      lithotripsy   ,   Family History   Problem Relation Age of Onset   • Depression Mother    • Cancer Mother         uterine   • Arrhythmia Mother    • Heart disease Father    • Hypertension Father    • Kidney disease Father    • Thyroid disease Father    • Depression Sister    • Alcohol abuse Brother    • Alcohol abuse Other    • Alcohol abuse Other    • Lung disease Other    • Thyroid disease Other    • Glaucoma Other    • Heart attack Other    • Stroke Maternal Grandmother    • Stroke Paternal Grandmother    ,   Social History     Tobacco Use   • Smoking status: Former Smoker     Packs/day: 0.75     Years: 16.00     Pack years: 12.00     Quit date: 2000     Years since quittin.2   • Smokeless tobacco: Never Used   • Tobacco comment: Start age:39/Stopping age:47, 3/4 PPD   Substance Use Topics   • Alcohol use: No     Comment: caffeine use - 1 cup daily   • Drug use: No    and Allergies:  Ace inhibitors,  Clonidine derivatives, Losartan, Amlodipine, Ativan [lorazepam], Xanax [alprazolam], Minoxidil, and Tetracycline    Objective     Vital Signs   Temp:  [97.6 °F (36.4 °C)] 97.6 °F (36.4 °C)  Heart Rate:  [67] 67  Resp:  [18] 18  BP: (166)/(98) 166/98    Physical Exam:   General:      well developed, well nourished, chronically ill appearing, in no acute distress.    Head:      normocephalic and atraumatic.    Eyes:      PERRL/EOM intact, conjunctivae and sclerae clear without nystagmus.    Neck:      no masses, thyromegaly,  trachea central with normal respiratory effort and PMI displaced laterally  Lungs:      clear bilaterally to auscultation.    Heart:       Regular rate and rhythm, no murmur no gallop  Abdomen:       Soft, nontender, not distended, bowel sounds positive, no shifting dullness.  Msk:      no deformity or scoliosis noted of thoracic or lumbar spine.    Pulses:      pulses normal in all 4 extremities.    Extremities:       no cyanosis or clubbing, +++edema.    Neurologic:      no focal deficits.   alert oriented x3  Skin:      intact without lesions or rashes.    Psych:      alert and cooperative; normal mood and affect; normal attention span and concentration.      LABS /Xray       CBC    Results from last 7 days   Lab Units 04/12/21  1452   WBC 10*3/mm3 6.44   HEMOGLOBIN g/dL 12.9*   PLATELETS 10*3/mm3 199     BMP   Results from last 7 days   Lab Units 04/09/21  1225   SODIUM mmol/L 139   POTASSIUM mmol/L 3.7   CHLORIDE mmol/L 98   CO2 mmol/L 30.4*   BUN mg/dL 32*   CREATININE mg/dL 1.72*   GLUCOSE mg/dL 177*     CMP   Results from last 7 days   Lab Units 04/09/21  1225   SODIUM mmol/L 139   POTASSIUM mmol/L 3.7   CHLORIDE mmol/L 98   CO2 mmol/L 30.4*   BUN mg/dL 32*   CREATININE mg/dL 1.72*   GLUCOSE mg/dL 177*   ALBUMIN g/dL 3.60   BILIRUBIN mg/dL 2.5*   ALK PHOS U/L 364*   AST (SGOT) U/L 33   ALT (SGPT) U/L 21     ABG      Imaging Results (Last 24 Hours)     Procedure Component Value Units  Date/Time    XR Chest 2 View [[id number]] Resulted: 04/12/21 1501     Updated: 04/12/21 1513            @Assessment  Assessment:        Diabetes mellitus, type II (CMS/[facility])    CAD (coronary artery disease)    Acute on chronic diastolic CHF (congestive heart failure) (CMS/[facility])    CSA (central sleep apnea)    HTN (hypertension)    Urinary retention    Acute on chronic renal failure (CMS/HCC)    Acute on chronic diastolic (congestive) heart failure (CMS/[facility])      · Acute kidney injury, with CKD, admission creatinine 1.51 on 4/12, creatinine 4/9 1.72, cr 1.12 on 3/19 Urine studies not available. AMENA possible pre-renal with CHF, fluid overload on exam, recent UTIs, less likely obstruction with light catheter in place draining urine.   · Chronic kidney disease, stage III, baseline creatinine noted around 1.3 on 08/2020. CKD most likely secondary to atherosclerotic disease.   · Acid/base stable  · Electrolytes  · Volume, overload on exam   · Echocardiogram  · Left ventricular ejection fraction appears to be 51 - 55%. Left ventricular systolic function is low normal. Normal left ventricular cavity size noted. Left ventricular wall thickness is consistent with moderate concentric hypertrophy. All left ventricular wall segments contract normally. Left ventricular diastolic function is consistent with (grade II w/high LAP) pseudonormalization.  · Right ventricular systolic pressure from tricuspid regurgitation is 70.1 mmHg  · BP, hypertensive - expect some improvement with aggressive diuresis   · Anemia  · Acute on chronic diastolic CHF  · Chronic right sided CHF, severe PHTN, RVSP 70mmHg  · CAD s/p CABG  · COCO,CSA on BiPap  · IDDM2  · Urinary retention, history of UTI  · History of orthopedic fractures, non-weight bearing status    Plan:     · Renal function is improving. Expect to return to baseline.   · Acid/base stable  · Electrolytes stable  · Volume, increased  · BP, hypertensive - expect some improvement with diuresis  but will monitor closely may need to adjust medications tomorrow.   · Agree with diuresis   · Continue Bumex  · Will check urine analysis and urine protein/creatinine ratio.   · Strict I/O  · Daily weights  · AM labs, will add magnesium for morning labs  · Avoid nephrotoxic medications  · Patient with indwelling light catheter  · Cardiology input noted  · Discussed with patient and spouse at bedside.   · Thank you for this consultation.       LEIDY Garland  [facility]  4/12/2021  15:33 EDT       Patient was seen earlier by  LEIDY. I personally have examined the patient and interviewed the patient. I have reviewed the history, data, problems, assessment and plan with the nurse practitioner during rounds. and I concur with the history. exam, asssesment and plan as described in the Progress note, with comments additions, revisions as noted.          Cristóbal Mayen MD  4/12/2021  [facility]

## 2021-04-12 NOTE — PROGRESS NOTES
"Pharmacy Consult - Lovenox    Dilip Verma has been consulted for pharmacy to dose enoxaparin for VTE prophylaxis per request of Dr. Sanchez.    Relevant clinical data and objective history reviewed:  71 y.o. male 175.3 cm (69\") 105 kg (231 lb 7.7 oz)    Past Medical History:   Diagnosis Date    AMENA (acute kidney injury) (CMS/[facility]) 12/3/2020    Anxiety     Chronic diastolic (congestive) heart failure (CMS/HCC)     Chronic kidney disease     stones- had lithotripsy    CKD (chronic kidney disease) stage 3, GFR 30-59 ml/min (CMS/HCC) 12/1/2020    Closed nondisplaced intertrochanteric fracture of left femur (CMS/HCC) 12/1/2020    Colon polyp     Coronary artery disease     CABG 7/2019    Diabetes mellitus, type II (CMS/[facility])     Erectile dysfunction     H/O bone density study never    Hyperlipidemia     Hypersomnia     Hypertension     Kidney stone     Orthostasis     Periodic breathing     Routine eye exam 2015    Sleep apnea     bipap    Stroke (CMS/HCC)     \"slight stroke\"     is allergic to ace inhibitors, clonidine derivatives, losartan, amlodipine, ativan [lorazepam], xanax [alprazolam], minoxidil, and tetracycline.    Lab Results   Component Value Date    WBC 5.40 02/17/2021    HGB 11.9 (L) 02/17/2021    HCT 36.7 (L) 02/17/2021    MCV 92.6 02/17/2021     02/17/2021     Lab Results   Component Value Date    GLUCOSE 177 (H) 04/09/2021    CALCIUM 9.2 04/09/2021     04/09/2021    K 3.7 04/09/2021    CO2 30.4 (H) 04/09/2021    CL 98 04/09/2021    BUN 32 (H) 04/09/2021    CREATININE 1.72 (H) 04/09/2021    EGFRIFAFRI 109 10/28/2015    EGFRIFNONA 39 (L) 04/09/2021    BCR 18.6 04/09/2021    ANIONGAP 10.6 04/09/2021       Estimated Creatinine Clearance: 46.3 mL/min (A) (by C-G formula based on SCr of 1.72 mg/dL (H)).    Assessment/Plan    Last SCr value is from 4/9/21. Stat labs are ordered. Will order enoxaparin 40mg subq x 1 dose now and re-evaluate once today's labs are available.    Emerald Hampton, " PharmD  4/12/2021

## 2021-04-13 ENCOUNTER — TELEPHONE (OUTPATIENT)
Dept: FAMILY MEDICINE CLINIC | Facility: CLINIC | Age: 71
End: 2021-04-13

## 2021-04-13 LAB
ALBUMIN SERPL-MCNC: 3.3 G/DL (ref 3.5–5.2)
ANION GAP SERPL CALCULATED.3IONS-SCNC: 7.9 MMOL/L (ref 5–15)
BUN SERPL-MCNC: 29 MG/DL (ref 8–23)
BUN/CREAT SERPL: 20.6 (ref 7–25)
CALCIUM SPEC-SCNC: 8.3 MG/DL (ref 8.6–10.5)
CHLORIDE SERPL-SCNC: 101 MMOL/L (ref 98–107)
CO2 SERPL-SCNC: 33.1 MMOL/L (ref 22–29)
CREAT SERPL-MCNC: 1.41 MG/DL (ref 0.76–1.27)
GFR SERPL CREATININE-BSD FRML MDRD: 49 ML/MIN/1.73
GLUCOSE BLDC GLUCOMTR-MCNC: 182 MG/DL (ref 70–130)
GLUCOSE BLDC GLUCOMTR-MCNC: 224 MG/DL (ref 70–130)
GLUCOSE SERPL-MCNC: 221 MG/DL (ref 65–99)
PHOSPHATE SERPL-MCNC: 3.2 MG/DL (ref 2.5–4.5)
POTASSIUM SERPL-SCNC: 3.6 MMOL/L (ref 3.5–5.2)
SODIUM SERPL-SCNC: 142 MMOL/L (ref 136–145)

## 2021-04-13 PROCEDURE — 99226 PR SBSQ OBSERVATION CARE/DAY 35 MINUTES: CPT | Performed by: INTERNAL MEDICINE

## 2021-04-13 PROCEDURE — 90791 PSYCH DIAGNOSTIC EVALUATION: CPT

## 2021-04-13 PROCEDURE — G0378 HOSPITAL OBSERVATION PER HR: HCPCS

## 2021-04-13 PROCEDURE — 82962 GLUCOSE BLOOD TEST: CPT

## 2021-04-13 PROCEDURE — 63710000001 INSULIN GLARGINE PER 5 UNITS: Performed by: HOSPITALIST

## 2021-04-13 PROCEDURE — 63710000001 INSULIN LISPRO (HUMAN) PER 5 UNITS: Performed by: HOSPITALIST

## 2021-04-13 PROCEDURE — 80069 RENAL FUNCTION PANEL: CPT | Performed by: INTERNAL MEDICINE

## 2021-04-13 PROCEDURE — 25010000002 ENOXAPARIN PER 10 MG: Performed by: INTERNAL MEDICINE

## 2021-04-13 RX ORDER — OXYMETAZOLINE HYDROCHLORIDE 0.05 G/100ML
2 SPRAY NASAL 2 TIMES DAILY
Status: DISPENSED | OUTPATIENT
Start: 2021-04-13 | End: 2021-04-16

## 2021-04-13 RX ORDER — DEXTROSE MONOHYDRATE 25 G/50ML
25 INJECTION, SOLUTION INTRAVENOUS
Status: DISCONTINUED | OUTPATIENT
Start: 2021-04-13 | End: 2021-04-23 | Stop reason: HOSPADM

## 2021-04-13 RX ORDER — NICOTINE POLACRILEX 4 MG
15 LOZENGE BUCCAL
Status: DISCONTINUED | OUTPATIENT
Start: 2021-04-13 | End: 2021-04-23 | Stop reason: HOSPADM

## 2021-04-13 RX ORDER — UREA 10 %
3 LOTION (ML) TOPICAL NIGHTLY PRN
Status: DISCONTINUED | OUTPATIENT
Start: 2021-04-13 | End: 2021-04-23 | Stop reason: HOSPADM

## 2021-04-13 RX ORDER — INSULIN LISPRO 100 [IU]/ML
0-9 INJECTION, SOLUTION INTRAVENOUS; SUBCUTANEOUS
Status: DISCONTINUED | OUTPATIENT
Start: 2021-04-13 | End: 2021-04-18

## 2021-04-13 RX ORDER — INSULIN GLARGINE 100 [IU]/ML
10 INJECTION, SOLUTION SUBCUTANEOUS EVERY MORNING
Status: DISCONTINUED | OUTPATIENT
Start: 2021-04-13 | End: 2021-04-15

## 2021-04-13 RX ORDER — BUMETANIDE 0.25 MG/ML
2 INJECTION INTRAMUSCULAR; INTRAVENOUS EVERY 6 HOURS
Status: DISCONTINUED | OUTPATIENT
Start: 2021-04-13 | End: 2021-04-17

## 2021-04-13 RX ADMIN — INSULIN GLARGINE 10 UNITS: 100 INJECTION, SOLUTION SUBCUTANEOUS at 15:03

## 2021-04-13 RX ADMIN — BUMETANIDE 2 MG: 0.25 INJECTION INTRAMUSCULAR; INTRAVENOUS at 01:31

## 2021-04-13 RX ADMIN — BUMETANIDE 2 MG: 0.25 INJECTION INTRAMUSCULAR; INTRAVENOUS at 19:03

## 2021-04-13 RX ADMIN — SODIUM CHLORIDE, PRESERVATIVE FREE 10 ML: 5 INJECTION INTRAVENOUS at 09:12

## 2021-04-13 RX ADMIN — ACETAMINOPHEN 650 MG: 325 TABLET, FILM COATED ORAL at 09:12

## 2021-04-13 RX ADMIN — INSULIN LISPRO 2 UNITS: 100 INJECTION, SOLUTION INTRAVENOUS; SUBCUTANEOUS at 19:04

## 2021-04-13 RX ADMIN — CARVEDILOL 12.5 MG: 12.5 TABLET, FILM COATED ORAL at 19:03

## 2021-04-13 RX ADMIN — Medication 3 MG: at 22:10

## 2021-04-13 RX ADMIN — TAMSULOSIN HYDROCHLORIDE 0.4 MG: 0.4 CAPSULE ORAL at 09:12

## 2021-04-13 RX ADMIN — SODIUM CHLORIDE, PRESERVATIVE FREE 10 ML: 5 INJECTION INTRAVENOUS at 20:05

## 2021-04-13 RX ADMIN — DOCUSATE SODIUM 50MG AND SENNOSIDES 8.6MG 2 TABLET: 8.6; 5 TABLET, FILM COATED ORAL at 04:00

## 2021-04-13 RX ADMIN — CARVEDILOL 12.5 MG: 12.5 TABLET, FILM COATED ORAL at 09:12

## 2021-04-13 RX ADMIN — INSULIN LISPRO 4 UNITS: 100 INJECTION, SOLUTION INTRAVENOUS; SUBCUTANEOUS at 12:47

## 2021-04-13 RX ADMIN — BUMETANIDE 2 MG: 0.25 INJECTION INTRAMUSCULAR; INTRAVENOUS at 23:39

## 2021-04-13 RX ADMIN — BUMETANIDE 2 MG: 0.25 INJECTION INTRAMUSCULAR; INTRAVENOUS at 10:51

## 2021-04-13 RX ADMIN — OXYMETAZOLINE HCL 2 SPRAY: 0.05 SPRAY NASAL at 22:10

## 2021-04-13 RX ADMIN — ACETAMINOPHEN 650 MG: 325 TABLET, FILM COATED ORAL at 23:39

## 2021-04-13 RX ADMIN — ACETAMINOPHEN 650 MG: 325 TABLET, FILM COATED ORAL at 04:00

## 2021-04-13 RX ADMIN — ENOXAPARIN SODIUM 40 MG: 40 INJECTION SUBCUTANEOUS at 15:03

## 2021-04-13 RX ADMIN — TAMSULOSIN HYDROCHLORIDE 0.4 MG: 0.4 CAPSULE ORAL at 20:05

## 2021-04-13 RX ADMIN — ASPIRIN 81 MG: 81 TABLET, COATED ORAL at 09:12

## 2021-04-13 NOTE — TELEPHONE ENCOUNTER
Caller: Janae Verma    Relationship to patient: Emergency Contact    Best call back number: [phone] (HOME)   [phone] (CELL)    Patient is needing: PATIENT IS IN STAGE 3 RENAL FAILURE. PATIENT WAS ADMITTED TO [facility] ROOM 524 YESTERDAY TO TRY TO GET FLUID OFF. X-RAY SHOWED PARTIALLY COLLAPSED LUNG.      JANAE IS VERY WORRIED ABOUT THE COLLAPSED LUNG AND WOULD LIKE TO SPEAK TO DR TRAN    PLEASE CALL

## 2021-04-13 NOTE — PROGRESS NOTES
"   LOS: 0 days   Patient Care Team:  Dakota Avila MD as PCP - General (Family Medicine)  Morris Cai MD as Consulting Physician (Sleep Medicine)  Michaela Hylton MD as Consulting Physician (Cardiology)  Hardy Banerjee MD as Consulting Physician (Ophthalmology)  Chula Christianson MD as Consulting Physician (Ophthalmology)  Jason Sherman MD (Endocrinology)  Perla Mayen MD as Consulting Physician (Nephrology)  Federico Hebert MD as Consulting Physician (Pulmonary Disease)  Johan Dillard, LUC as Ambulatory  (Population Health)  Niraj Ravi MD as Consulting Physician (Orthopedic Surgery)  Papa Velasco MD as Consulting Physician (Urology)    Chief Complaint: volume overload     Interval History: Numerous complaints today -- perineal pain, penile swelling, constipation, hip/leg pain, pain all over.  No SOA at rest though.     Objective   Vital Signs  Temp:  [97 °F (36.1 °C)-98.6 °F (37 °C)] 98.6 °F (37 °C)  Heart Rate:  [63-67] 64  Resp:  [16-18] 16  BP: (150-166)/(76-98) 150/76    Intake/Output Summary (Last 24 hours) at 4/13/2021 1329  Last data filed at 4/13/2021 0930  Gross per 24 hour   Intake 480 ml   Output 3075 ml   Net -2595 ml       Last Weight and Admission Weight        04/13/21  0500   Weight: 106 kg (234 lb 2.1 oz)     Flowsheet Rows      First Filed Value   Admission Height  175.3 cm (69\") Documented at 04/12/2021 1335   Admission Weight  105 kg (231 lb 7.7 oz) Documented at 04/12/2021 1335                  Physical Exam  Constitutional:       General: He is not in acute distress.  Eyes:      Conjunctiva/sclera: Conjunctivae normal.   Neck:      Vascular: JVD present.   Cardiovascular:      Rate and Rhythm: Normal rate.      Heart sounds: Murmur heard.   Systolic murmur is present with a grade of 1/6.        Comments: 2-3+ BLE to thighs; numerous skin tears/scabs  Pulmonary:      Effort: Pulmonary effort is normal.      Breath sounds: " Examination of the right-lower field reveals decreased breath sounds. Decreased breath sounds present.   Abdominal:      Palpations: Abdomen is soft.      Tenderness: There is abdominal tenderness.   Musculoskeletal:      Right lower leg: Edema present.      Left lower leg: Edema present.   Skin:     General: Skin is warm.      Coloration: Skin is pale.   Neurological:      Mental Status: Mental status is at baseline.   Psychiatric:      Comments: Odd affect, anxious       Results Review:      Results from last 7 days   Lab Units 04/13/21  0930 04/12/21  1521 04/09/21  1225   SODIUM mmol/L 142 140 139   POTASSIUM mmol/L 3.6 3.7 3.7   CHLORIDE mmol/L 101 101 98   CO2 mmol/L 33.1* 27.7 30.4*   BUN mg/dL 29* 31* 32*   CREATININE mg/dL 1.41* 1.51* 1.72*   GLUCOSE mg/dL 221* 177* 177*   CALCIUM mg/dL 8.3* 8.4* 9.2         Results from last 7 days   Lab Units 04/12/21  1452   WBC 10*3/mm3 6.44   HEMOGLOBIN g/dL 12.9*   HEMATOCRIT % 38.5   PLATELETS 10*3/mm3 199                     I reviewed the patient's new clinical results.    I personally viewed and interpreted the patient's EKG/Telemetry data        Medication Review:   aspirin, 81 mg, Oral, Daily  bumetanide, 2 mg, Intravenous, Q8H  carvedilol, 12.5 mg, Oral, BID With Meals  enoxaparin, 40 mg, Subcutaneous, Q24H  insulin glargine, 10 Units, Subcutaneous, QAM  insulin lispro, 0-9 Units, Subcutaneous, TID With Meals  sodium chloride, 10 mL, Intravenous, Q12H  tamsulosin, 0.4 mg, Oral, BID           Assessment/Plan     1. Acute on chronic diastolic CHF  2. Chronic right sided CHF/severe PHTN, RVSP 70mm Hg  3. Longstanding severe essential hypertension  4. CAD s/p CABG  5. AMENA on CKD, stabilizing  6. Combined COCO/CSA, on BiPAP (his wife brought his machine from home)   7. IDDM2  8. Urinary retention, history of UTI  9. History of orthopedic fractures/non-weight bearing status     *Continue IV bumetanide, follow labs and daily weights  *Nephrology ordered UA, which is  very abnormal, will d/w medicine; given abnormal UA, penile swelling, indwelling light, will consult his urologist to see as well  *Complains of body-wide pain; he does not take narcotics at home but is asking for them here.  I don't see any acute issue that would worsen his prior orthopedic pain, and I worry about respiratory depression with them.      Cyril Sanchez MD  04/13/21  13:29 EDT

## 2021-04-13 NOTE — PROGRESS NOTES
PROGRESS NOTE      Name: Dilip Verma ADMIT: 2021   : 1950  PCP: Dakota Avila MD    MRN: [id number] LOS: 0 days   AGE/SEX: 71 y.o. male  ROOM: [facility]     Subjective .  INTERVAL History        Patient seen and examined.  Renal function stable  Persistent edema   BP elevated  Alkalosis developing  Electrolytes stable        Medications Prior to Admission   Medication Sig Dispense Refill Last Dose   • aspirin 81 MG EC tablet Take 81 mg by mouth Daily.   2021 at Unknown time   • carvedilol (COREG) 12.5 MG tablet Take one tablet twice daily with meals 180 tablet 2 2021 at Unknown time   • Cholecalciferol (Vitamin D) 125 MCG (5000 UT) capsule capsule Take 5,000 Units by mouth Daily.   2021 at Unknown time   • furosemide (LASIX) 40 MG tablet Take 1.5 tablets by mouth 2 (Two) Times a Day.   2021 at Unknown time   • hydrALAZINE (APRESOLINE) 25 MG tablet Take 1 tablet by mouth 2 (Two) Times a Day As Needed (for SBP > 160). 90 tablet 0 2021 at Unknown time   • Insulin Glargine, 2 Unit Dial, (TOUJEO) 300 UNIT/ML solution pen-injector injection    2021 at Unknown time   • insulin lispro (humaLOG) 100 UNIT/ML injection Inject 0-14 Units under the skin into the appropriate area as directed 3 (Three) Times a Day Before Meals.  12 2021 at Unknown time   • melatonin 3 MG tablet Take 9 mg by mouth Every Night.   Patient Taking Differently at Unknown time   • Oxymetazoline HCl (AFRIN NASAL SPRAY NA) into the nostril(s) as directed by provider.   2021 at Unknown time   • tamsulosin (FLOMAX) 0.4 MG capsule 24 hr capsule TAKE 2 CAPSULES BY MOUTH AT BEDTIME  (Patient taking differently: Take 1 capsule by mouth 2 (Two) Times a Day.) 60 capsule 0 Patient Taking Differently at Unknown time   • sennosides-docusate (senna-docusate sodium) 8.6-50 MG per tablet Take 2 tablets by mouth 2 (two) times a day. 56 tablet 0 Unknown at Unknown time     Allergies:  Ace inhibitors, Clonidine  "derivatives, Losartan, Amlodipine, Ativan [lorazepam], Xanax [alprazolam], Minoxidil, and Tetracycline  REVIEW OF SYSTEMS  Constitutional: + weakness.  HEENT:           No headache, otalgia, itchy eyes, nasal discharge or sore throat.  Cardiac:           No chest pain, orthopnea or PND. +dyspnea  Chest:              No cough, phlegm or wheezing.  Abdomen:        No abdominal pain, nausea or vomiting.  Neuro:             No focal weakness, abnormal movements or seizure-like activity.  :                  History of difficulty urinating  Ext:                  + swelling  ROS was otherwise negative except as mentioned in the [place].   Objective   PHYSICAL EXAM  /76 (BP Location: Left arm, Patient Position: Lying)   Pulse 64   Temp 98.6 °F (37 °C) (Oral)   Resp 16   Ht 175.3 cm (69\")   Wt 106 kg (234 lb 2.1 oz)   SpO2 94%   BMI 34.57 kg/m²   Intake/Output last 3 shifts:  I/O last 3 completed shifts:  In: 240 [P.O.:240]  Out: 2475 [Urine:2475]  Intake/Output this shift:  I/O this shift:  In: 240 [P.O.:240]  Out: 600 [Urine:600]    General:  Chronically ill appearing,  male in no acute distress.    Head:      Normocephalic and atraumatic.    Eyes:      PERRL/EOM intact, conjunctiva and sclera clear with out nystagmus.    Neck:      No masses, thyromegaly,  trachea central with normal respiratory effort and PMI displaced laterally  Lungs:    Clear bilaterally to auscultation.    Heart:      Regular rate and rhythm, no murmur no gallop  Abd:        Soft, nontender, not distended, bowel sounds positive, no shifting dullness.  Msk:        No deformity or scoliosis noted of thoracic or lumbar spine.    Pulses:   Pulses normal in all 4 extremities.    Extr:        No cyanosis or clubbing, ++ edema.    Neuro:    No focal deficits.   alert oriented x3  Skin:       Intact without lesions or rashes.    Psych:    Alert and cooperative; normal mood and affect; normal attention span and concentration.      LABS:    CBC  "   Results from last 7 days   Lab Units 04/12/21  1452   WBC 10*3/mm3 6.44   HEMOGLOBIN g/dL 12.9*   PLATELETS 10*3/mm3 199     BMP   Results from last 7 days   Lab Units 04/13/21  0930 04/12/21  1521 04/09/21  1225   SODIUM mmol/L 142 140 139   POTASSIUM mmol/L 3.6 3.7 3.7   CHLORIDE mmol/L 101 101 98   CO2 mmol/L 33.1* 27.7 30.4*   BUN mg/dL 29* 31* 32*   CREATININE mg/dL 1.41* 1.51* 1.72*   GLUCOSE mg/dL 221* 177* 177*   PHOSPHORUS mg/dL 3.2  --   --      CMP   Results from last 7 days   Lab Units 04/13/21  0930 04/12/21  1521 04/09/21  1225   SODIUM mmol/L 142 140 139   POTASSIUM mmol/L 3.6 3.7 3.7   CHLORIDE mmol/L 101 101 98   CO2 mmol/L 33.1* 27.7 30.4*   BUN mg/dL 29* 31* 32*   CREATININE mg/dL 1.41* 1.51* 1.72*   GLUCOSE mg/dL 221* 177* 177*   ALBUMIN g/dL 3.30* 3.30* 3.60   BILIRUBIN mg/dL  --  2.5* 2.5*   ALK PHOS U/L  --  325* 364*   AST (SGOT) U/L  --  29 33   ALT (SGPT) U/L  --  23 21     ABG      Imaging Results (Last 24 Hours)     ** No results found for the last 24 hours. **          Results for orders placed during the hospital encounter of 04/02/21    Adult Transthoracic Echo Complete w/ Color, Spectral and Contrast if Necessary Per Protocol    Interpretation Summary  · Left ventricular ejection fraction appears to be 51 - 55%. Left ventricular systolic function is low normal. Normal left ventricular cavity size noted. Left ventricular wall thickness is consistent with moderate concentric hypertrophy. All left ventricular wall segments contract normally. Left ventricular diastolic function is consistent with (grade II w/high LAP) pseudonormalization.  · Calculated right ventricular systolic pressure from tricuspid regurgitation is 70.1 mmHg.      Assessment/Plan   Assessment:        Diabetes mellitus, type II (CMS/HCC)    CAD (coronary artery disease)    Acute on chronic diastolic CHF (congestive heart failure) (CMS/HCC)    CSA (central sleep apnea)    HTN (hypertension)    Urinary retention     Acute on chronic renal failure (CMS/HCC)    Acute on chronic diastolic (congestive) heart failure (CMS/[facility])    · Acute kidney injury, with CKD, admission creatinine 1.51 on 4/12, creatinine 4/9 1.72, cr 1.12 on 3/19 Urine studies not available. AMENA possible pre-renal with CHF, fluid overload on exam, recent UTIs, less likely obstruction with light catheter in place draining urine.   · Chronic kidney disease, stage III, baseline creatinine noted around 1.3 on 08/2020. CKD most likely secondary to atherosclerotic disease.   · Acid/base stable  · Electrolytes  · Volume, overload on exam   · Echocardiogram  · Left ventricular ejection fraction appears to be 51 - 55%. Left ventricular systolic function is low normal. Normal left ventricular cavity size noted. Left ventricular wall thickness is consistent with moderate concentric hypertrophy. All left ventricular wall segments contract normally. Left ventricular diastolic function is consistent with (grade II w/high LAP) pseudonormalization.  · Right ventricular systolic pressure from tricuspid regurgitation is 70.1 mmHg  · BP, hypertensive - expect some improvement with aggressive diuresis   · Anemia  · Acute on chronic diastolic CHF  · Chronic right sided CHF, severe PHTN, RVSP 70mmHg  · CAD s/p CABG  · COCO,CSA on BiPap  · IDDM2  · Urinary retention, history of UTI  · History of orthopedic fractures, non-weight bearing status     Plan:      ? Renal function is improving. Expect to return to baseline.   ? Urine analysis grossly abnormal with hematuria and pyuria TNTC, lots of hyaline casts, 4+ bacteria, 3+ leukocyte esterase.   ? Urine protein/creatinine ratio 462mg/G  ? Will repeat urinalysis and order urine culture, please draw from tubing not bag. No current symptoms, remains afebrile, with chronic indwelling light catheter, may have some colonization.  ? Alkalosis developing, will need aggressive diuresis, increased ECF volume   ? Electrolytes, stable  ? Volume,  increased. Will try to keep in negative balance  ? BP, hypertensive - expect some improvement with diuresis but will monitor closely may need to adjust medications tomorrow.   ? Agree with diuresis   ? Increase  Bumex 2mg IV Q6hr  ? Strict I/O  ? Daily weights - noted increase weight from yesterday  ? AM labs  ? Avoid nephrotoxic medications  ? Patient with indwelling light catheter  ? Cardiology input noted  ? Discussed with patient and spouse at bedside.   ? We will follow closely    Patient seen earlier by Dr. Mayen. I have transcribed note for attending.       [facility]  4/13/2021  12:00 EDT     The note was transcribed by LEIDY Garland.  I personally have examined the patient and interviewed the patient and modified the history, exam. I have reviewed the history, data, problems, assessment and plan .and I concur . Exam as described in the Progress note, with comments additions, revisions as noted by me.         Cristóbal Mayen MD  [facility]  4/13/2021  16:20 EDT

## 2021-04-13 NOTE — CONSULTS
Patient Name:  Dilip Verma  YOB: 1950  MRN:  [id number]  Date of Admission:  4/12/2021  Date of Consult:  4/13/2021  Patient Care Team:  Dakota Avila MD as PCP - General (Family Medicine)  Morris Cai MD as Consulting Physician (Sleep Medicine)  Michaela Hylton MD as Consulting Physician (Cardiology)  Hardy Banerjee MD as Consulting Physician (Ophthalmology)  Chula Christianson MD as Consulting Physician (Ophthalmology)  Jason Sherman MD (Endocrinology)  Perla Mayen MD as Consulting Physician (Nephrology)  Federico Hebert MD as Consulting Physician (Pulmonary Disease)  Johan Dillard RN as Ambulatory  ([facility])  Niraj Ravi MD as Consulting Physician (Orthopedic Surgery)  Papa Velasco MD as Consulting Physician (Urology)    Inpatient Internal Medicine Consult  Consult performed by: Lasha De La Rosa MD  Consult ordered by: Cyril Sanchez MD  Reason for consult: Evaluate status and make recommendations regarding treatment for diabetes        Subjective   History of Present Illness  Mr. Verma is a 71 y.o. male that has been admitted to [facility] due to CHF.  He has been admitted by the cardiology service and and we were asked to see and assist with his medical problems, specifically relating to his diabetes.  He has multiple medical problems including insulin-dependent diabetes.  He takes multiple shots of insulin every day.  At the time of my exam he complains of some discomfort in the perineal area.  Breathing comfortably.  Decreased appetite.    Past Medical History:   Diagnosis Date   • AMENA (acute kidney injury) (CMS/HCC) 12/3/2020   • Anxiety    • Chronic diastolic (congestive) heart failure (CMS/HCC)    • Chronic kidney disease     stones- had lithotripsy   • CKD (chronic kidney disease) stage 3, GFR 30-59 ml/min (CMS/HCC) 12/1/2020   • Closed nondisplaced intertrochanteric fracture of left femur  "(CMS/[facility]) 12/1/2020   • Colon polyp    • Coronary artery disease     CABG 7/2019   • Diabetes mellitus, type II (CMS/[facility])    • Erectile dysfunction    • H/O bone density study never   • Hyperlipidemia    • Hypersomnia    • Hypertension    • Kidney stone    • Orthostasis    • Periodic breathing    • Routine eye exam 2015   • Sleep apnea     bipap   • Stroke (CMS/[facility])     \"slight stroke\"     Past Surgical History:   Procedure Laterality Date   • CARDIAC CATHETERIZATION N/A 7/15/2019    Procedure: Coronary angiography;  Surgeon: Carrie Price MD;  Location:  [facility]VASIVE [facility];  Service: Cardiovascular   • CARDIAC CATHETERIZATION N/A 7/15/2019    Procedure: Left Heart Cath;  Surgeon: Carrie Price MD;  Location:  [facility];  Service: Cardiovascular   • CARDIAC CATHETERIZATION N/A 7/15/2019    Procedure: Left ventriculography;  Surgeon: Carrie Price MD;  Location:  [facility];  Service: Cardiovascular   • CARDIAC CATHETERIZATION  7/15/2019    Procedure: Functional Flow [place];  Surgeon: Carrie Price MD;  Location:  [facility];  Service: Cardiovascular   • CARDIAC CATHETERIZATION N/A 11/6/2019    Procedure: Right and Left Heart Cath;  Surgeon: Mya Smith MD;  Location:  [facility]IVE LOCATION;  Service: Cardiovascular   • CARDIAC CATHETERIZATION N/A 11/6/2019    Procedure: Coronary angiography;  Surgeon: Mya Smith MD;  Location: [facility];  Service: Cardiovascular   • CATARACT EXTRACTION EXTRACAPSULAR W/ INTRAOCULAR LENS IMPLANTATION Bilateral    • COLONOSCOPY  01/2015    dr [email or web address]   • CORONARY ARTERY BYPASS GRAFT N/A 7/18/2019    Procedure: INTRAOPERATIVE SHOAIB; STERNOTOMY CORONARY ARTERY BYPASS x 3  USING LEFT INTERNAL MAMMARY ARTERY GRAFT UTILIZING ENDOSCOPICALLY HARVESTED RIGHT GREATER SAPHENOUS VEIN AND PRP.;  Surgeon: Bill Devi MD;  Location: [facility] MAIN OR;  Service: Cardiothoracic   • DENTAL " PROCEDURE      3 surgeries inder implants   • FEMUR IM NAILING/RODDING Left 12/3/2020    Procedure: LEFT HIP INTRAMEDULLARY NAIL;  Surgeon: Niraj Ravi MD;  Location: [facility];  Service: Orthopedic Spine;  Laterality: Left;   • HERNIA REPAIR      inguinal bilaterally   • KIDNEY STONE SURGERY      lithotripsy     Family History   Problem Relation Age of Onset   • Depression Mother    • Cancer Mother         uterine   • Arrhythmia Mother    • Heart disease Father    • Hypertension Father    • Kidney disease Father    • Thyroid disease Father    • Depression Sister    • Alcohol abuse Brother    • Alcohol abuse Other    • Alcohol abuse Other    • Lung disease Other    • Thyroid disease Other    • Glaucoma Other    • Heart attack Other    • Stroke Maternal Grandmother    • Stroke Paternal Grandmother      Social History     Tobacco Use   • Smoking status: Former Smoker     Packs/day: 0.75     Years: 16.00     Pack years: 12.00     Quit date:      Years since quittin.2   • Smokeless tobacco: Never Used   • Tobacco comment: Start age:39/Stopping age:47, 3/4 PPD   Substance Use Topics   • Alcohol use: No     Comment: caffeine use - 1 cup daily   • Drug use: No     Medications Prior to Admission   Medication Sig Dispense Refill Last Dose   • aspirin 81 MG EC tablet Take 81 mg by mouth Daily.   2021 at Unknown time   • carvedilol (COREG) 12.5 MG tablet Take one tablet twice daily with meals 180 tablet 2 2021 at Unknown time   • Cholecalciferol (Vitamin D) 125 MCG (5000 UT) capsule capsule Take 5,000 Units by mouth Daily.   2021 at Unknown time   • furosemide (LASIX) 40 MG tablet Take 1.5 tablets by mouth 2 (Two) Times a Day.   2021 at Unknown time   • hydrALAZINE (APRESOLINE) 25 MG tablet Take 1 tablet by mouth 2 (Two) Times a Day As Needed (for SBP > 160). 90 tablet 0 2021 at Unknown time   • Insulin Glargine, 2 Unit Dial, (TOUJEO) 300 UNIT/ML solution pen-injector injection     4/12/2021 at Unknown time   • insulin lispro (humaLOG) 100 UNIT/ML injection Inject 0-14 Units under the skin into the appropriate area as directed 3 (Three) Times a Day Before Meals.  12 4/11/2021 at Unknown time   • melatonin 3 MG tablet Take 9 mg by mouth Every Night.   Patient Taking Differently at Unknown time   • Oxymetazoline HCl (AFRIN NASAL SPRAY NA) into the nostril(s) as directed by provider.   4/11/2021 at Unknown time   • tamsulosin (FLOMAX) 0.4 MG capsule 24 hr capsule TAKE 2 CAPSULES BY MOUTH AT BEDTIME  (Patient taking differently: Take 1 capsule by mouth 2 (Two) Times a Day.) 60 capsule 0 Patient Taking Differently at Unknown time   • sennosides-docusate (senna-docusate sodium) 8.6-50 MG per tablet Take 2 tablets by mouth 2 (two) times a day. 56 tablet 0 Unknown at Unknown time     Allergies:  Ace inhibitors, Clonidine derivatives, Losartan, Amlodipine, Ativan [lorazepam], Xanax [alprazolam], Minoxidil, and Tetracycline    Review of Systems   Constitutional: Positive for unexpected weight change.   HENT: Negative.    Eyes: Negative.    Respiratory: Negative.    Cardiovascular: Positive for leg swelling.   Gastrointestinal: Positive for abdominal distention and abdominal pain.   Endocrine: Negative.    Genitourinary: Positive for difficulty urinating.   Musculoskeletal: Negative.         Recent fracture LLE   Skin: Negative.    Neurological: Negative.    Hematological: Negative.    Psychiatric/Behavioral: Negative.        Objective      Vital Signs  Temp:  [97 °F (36.1 °C)-98.6 °F (37 °C)] 98.6 °F (37 °C)  Heart Rate:  [63-67] 64  Resp:  [16-18] 16  BP: (150-166)/(76-98) 150/76  Body mass index is 34.57 kg/m².    Physical Exam  Vitals reviewed.   Constitutional:       Appearance: Normal appearance. He is well-developed. He is obese.   HENT:      Head: Normocephalic and atraumatic.   Eyes:      General: No scleral icterus.     Conjunctiva/sclera: Conjunctivae normal.   Neck:      Vascular: No JVD.    Cardiovascular:      Rate and Rhythm: Normal rate and regular rhythm.      Heart sounds: No murmur heard.     Pulmonary:      Effort: Pulmonary effort is normal. No respiratory distress.      Breath sounds: Decreased air movement present.   Abdominal:      General: Bowel sounds are normal. There is no distension.      Palpations: Abdomen is soft.      Tenderness: There is no abdominal tenderness.   Musculoskeletal:      Cervical back: Normal range of motion and neck supple.      Right lower leg: Edema present.      Left lower leg: Edema present.   Skin:     General: Skin is warm and dry.   Neurological:      Mental Status: He is alert and oriented to person, place, and time.      Cranial Nerves: No cranial nerve deficit.   Psychiatric:         Behavior: Behavior normal.         Results Review:   I reviewed the patient's new clinical results.  I reviewed the patient's new imaging results and agree with the interpretation.  I reviewed the patient's other test results and agree with the interpretation  I personally viewed and interpreted the patient's EKG/Telemetry data         Assessment/Plan     Active Hospital Problems    Diagnosis  POA   • Acute on chronic diastolic (congestive) heart failure (CMS/HCC) [I50.33]  Yes   • Acute on chronic renal failure (CMS/HCC) [N17.9, N18.9]  Unknown   • Urinary retention [R33.9]  Yes   • HTN (hypertension) [I10]  Yes   • CSA (central sleep apnea) [G47.31]  Yes   • Acute on chronic diastolic CHF (congestive heart failure) (CMS/HCC) [I50.33]  Unknown   • CAD (coronary artery disease) [I25.10]  Yes   • Type 2 diabetes mellitus with hyperglycemia, with long-term current use of insulin (CMS/HCC) [E11.65, Z79.4]  Not Applicable         Type 2 diabetes mellitus with hyperglycemia, with long-term current use of insulin   · Will provide attenuated dose Lantus for now in place of Toujeo.  · RN to clarify exact home regimen.  · A1c acceptable, no plans to change regimen at discharge.    · Moderate dose SSI  · Continue daily monitoring and make adjustments as needed.      Thank you very much for asking [facility] to be involved in this patient's care. We will follow along with you.      Lasha De La Rosa MD  [facility]  04/13/21  12:46 EDT

## 2021-04-13 NOTE — PLAN OF CARE
Goal Outcome Evaluation:  Plan of Care Reviewed With: patient, spouse  Progress: no change  Outcome Summary: Patient very anxious at the beginning of the shift before wife arrived. Access consulted. IV bumex given per MAR. c/o pain tylenol given. Will CTM the rest of my shift.

## 2021-04-13 NOTE — PLAN OF CARE
Patient was A/O. He did have some periods of confusion while in and out of sleep. Patient kept asking for bedpan assistance but did not have a BM. PRN stool softeners given for this. Will continue to monitor.     Goal Outcome Evaluation:  Plan of Care Reviewed With: patient  Progress: no change       Problem: Adult Inpatient Plan of Care  Goal: Plan of Care Review  Outcome: Ongoing, Progressing  Flowsheets (Taken 4/13/2021 0600)  Progress: no change  Plan of Care Reviewed With: patient  Goal: Patient-Specific Goal (Individualized)  Outcome: Ongoing, Progressing  Goal: Absence of Hospital-Acquired Illness or Injury  Outcome: Ongoing, Progressing  Intervention: Identify and Manage Fall Risk  Recent Flowsheet Documentation  Taken 4/13/2021 0426 by Di Grove RN  Safety Promotion/Fall Prevention:   safety round/check completed   room organization consistent   activity supervised   assistive device/personal items within reach   clutter free environment maintained  Taken 4/13/2021 0200 by Di Grove RN  Safety Promotion/Fall Prevention:   safety round/check completed   room organization consistent   activity supervised   assistive device/personal items within reach   clutter free environment maintained  Intervention: Prevent Skin Injury  Recent Flowsheet Documentation  Taken 4/13/2021 0426 by Di Grove RN  Body Position:   supine   supine, legs elevated  Taken 4/13/2021 0200 by Di Grove RN  Body Position:   turned   position changed independently  Goal: Optimal Comfort and Wellbeing  Outcome: Ongoing, Progressing  Goal: Readiness for Transition of Care  Outcome: Ongoing, Progressing     Problem: Fall Injury Risk  Goal: Absence of Fall and Fall-Related Injury  Outcome: Ongoing, Progressing  Intervention: Promote Injury-Free Environment  Recent Flowsheet Documentation  Taken 4/13/2021 0426 by Di Grove RN  Safety Promotion/Fall Prevention:   safety round/check completed   room organization consistent    activity supervised   assistive device/personal items within reach   clutter free environment maintained  Taken 4/13/2021 0200 by Di Grove, RN  Safety Promotion/Fall Prevention:   safety round/check completed   room organization consistent   activity supervised   assistive device/personal items within reach   clutter free environment maintained     Problem: Skin Injury Risk Increased  Goal: Skin Health and Integrity  Outcome: Ongoing, Progressing     Problem: Asthma Comorbidity  Goal: Maintenance of Asthma Control  Outcome: Ongoing, Progressing     Problem: COPD Comorbidity  Goal: Maintenance of COPD Symptom Control  Outcome: Ongoing, Progressing     Problem: Diabetes Comorbidity  Goal: Blood Glucose Level Within Desired Range  Outcome: Ongoing, Progressing     Problem: Heart Failure Comorbidity  Goal: Maintenance of Heart Failure Symptom Control  Outcome: Ongoing, Progressing     Problem: Hypertension Comorbidity  Goal: Blood Pressure in Desired Range  Outcome: Ongoing, Progressing

## 2021-04-13 NOTE — PROGRESS NOTES
Discharge Planning Assessment  [facility]     Patient Name: Dilip Verma  MRN: [id number]  Today's Date: 4/13/2021    Admit Date: 4/12/2021    Discharge Needs Assessment     Row Name 04/13/21 1702       Living Environment    Lives With  spouse    Current Living Arrangements  home/apartment/condo    Primary Care Provided by  spouse/significant other    Provides Primary Care For  no one, unable/limited ability to care for self    Family Caregiver if Needed  child(diam), adult    Quality of Family Relationships  helpful;involved;supportive    Able to Return to Prior Arrangements  yes       Resource/Environmental Concerns    Resource/Environmental Concerns  none    Transportation Concerns  car, none       Transition Planning    Patient/Family Anticipates Transition to  home with family    Patient/Family Anticipated Services at Transition  outpatient care    Transportation Anticipated  family or friend will provide       Discharge Needs Assessment    Readmission Within the Last 30 Days  no previous admission in last 30 days    Equipment Currently Used at Home  bipap;shower chair;power chair,(recliner lift);lift device;rollator;wheelchair;oxygen    Concerns to be Addressed  adjustment to diagnosis/illness;discharge planning    Anticipated Changes Related to Illness  none    Equipment Needed After Discharge  none    Discharge Facility/Level of Care Needs  outpatient therapy    Current Discharge Risk  chronically ill;dependent with mobility/activities of daily living        Discharge Plan     Row Name 04/13/21 1704       Plan    Plan  Home with wife and outpt PT- follow for additional dc needs    Provided Post Acute Provider List?  Refused    Patient/Family in Agreement with Plan  yes    Plan Comments  Spoke with pts wife Beny Verma into pts room. Introduced self and explained CCP role. Verified facesheet and confirmed local pharmacy is [facility]. Pt lives at home with wife Beny in a home with ramp  access. Pt has had multiple medical illness and challenges during his recovery after his inital Hip surgery. Pt has recliner lift chair, stair lift, rollator, up walker, transport chair and bedside comode. Pt also has oxygen thru [employer] and BIPAP. Pt has used [facility] in the past but plan at dc is to go to outpt therapy. Wife assists in care needs. Offered private pay caregiver list but declines at this time. Neighbor has been helpful. They decline any other additional dc needs at this time. CCP to follow. jelani grant/ccp        Continued Care and Services - Admitted Since 4/12/2021    Coordination has not been started for this encounter.         Demographic Summary     Row Name 04/13/21 5496       General Information    Admission Type  observation    Reason for Consult  discharge planning    Preferred Language  English     Used During This Interaction  no       Contact Information    Permission Granted to Share Info With  family/designee        Functional Status    No documentation.       Psychosocial    No documentation.       Abuse/Neglect    No documentation.       Legal    No documentation.       Substance Abuse    No documentation.       Patient Forms    No documentation.           Abi Bonds, LUC
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
## 2021-04-14 LAB
ALBUMIN SERPL-MCNC: 3.3 G/DL (ref 3.5–5.2)
ANION GAP SERPL CALCULATED.3IONS-SCNC: 11.3 MMOL/L (ref 5–15)
BACTERIA UR QL AUTO: ABNORMAL /HPF
BASOPHILS # BLD AUTO: 0.03 10*3/MM3 (ref 0–0.2)
BASOPHILS NFR BLD AUTO: 0.5 % (ref 0–1.5)
BILIRUB UR QL STRIP: NEGATIVE
BUN SERPL-MCNC: 29 MG/DL (ref 8–23)
BUN/CREAT SERPL: 22.1 (ref 7–25)
CALCIUM SPEC-SCNC: 8.5 MG/DL (ref 8.6–10.5)
CHLORIDE SERPL-SCNC: 97 MMOL/L (ref 98–107)
CLARITY UR: ABNORMAL
CO2 SERPL-SCNC: 31.7 MMOL/L (ref 22–29)
COLOR UR: YELLOW
CREAT SERPL-MCNC: 1.31 MG/DL (ref 0.76–1.27)
DEPRECATED RDW RBC AUTO: 46.8 FL (ref 37–54)
EOSINOPHIL # BLD AUTO: 0.27 10*3/MM3 (ref 0–0.4)
EOSINOPHIL NFR BLD AUTO: 4.4 % (ref 0.3–6.2)
ERYTHROCYTE [DISTWIDTH] IN BLOOD BY AUTOMATED COUNT: 14.1 % (ref 12.3–15.4)
GFR SERPL CREATININE-BSD FRML MDRD: 54 ML/MIN/1.73
GLUCOSE BLDC GLUCOMTR-MCNC: 164 MG/DL (ref 70–130)
GLUCOSE BLDC GLUCOMTR-MCNC: 168 MG/DL (ref 70–130)
GLUCOSE BLDC GLUCOMTR-MCNC: 187 MG/DL (ref 70–130)
GLUCOSE BLDC GLUCOMTR-MCNC: 196 MG/DL (ref 70–130)
GLUCOSE SERPL-MCNC: 181 MG/DL (ref 65–99)
GLUCOSE UR STRIP-MCNC: NEGATIVE MG/DL
HCT VFR BLD AUTO: 37.6 % (ref 37.5–51)
HGB BLD-MCNC: 12.5 G/DL (ref 13–17.7)
HGB UR QL STRIP.AUTO: ABNORMAL
HYALINE CASTS UR QL AUTO: ABNORMAL /LPF
IMM GRANULOCYTES # BLD AUTO: 0.01 10*3/MM3 (ref 0–0.05)
IMM GRANULOCYTES NFR BLD AUTO: 0.2 % (ref 0–0.5)
KETONES UR QL STRIP: NEGATIVE
LEUKOCYTE ESTERASE UR QL STRIP.AUTO: ABNORMAL
LYMPHOCYTES # BLD AUTO: 1.06 10*3/MM3 (ref 0.7–3.1)
LYMPHOCYTES NFR BLD AUTO: 17.3 % (ref 19.6–45.3)
MCH RBC QN AUTO: 30.7 PG (ref 26.6–33)
MCHC RBC AUTO-ENTMCNC: 33.2 G/DL (ref 31.5–35.7)
MCV RBC AUTO: 92.4 FL (ref 79–97)
MONOCYTES # BLD AUTO: 0.61 10*3/MM3 (ref 0.1–0.9)
MONOCYTES NFR BLD AUTO: 10 % (ref 5–12)
NEUTROPHILS NFR BLD AUTO: 4.14 10*3/MM3 (ref 1.7–7)
NEUTROPHILS NFR BLD AUTO: 67.6 % (ref 42.7–76)
NITRITE UR QL STRIP: POSITIVE
NRBC BLD AUTO-RTO: 0 /100 WBC (ref 0–0.2)
PH UR STRIP.AUTO: 6.5 [PH] (ref 5–8)
PHOSPHATE SERPL-MCNC: 3.7 MG/DL (ref 2.5–4.5)
PLATELET # BLD AUTO: 192 10*3/MM3 (ref 140–450)
PMV BLD AUTO: 10.9 FL (ref 6–12)
POTASSIUM SERPL-SCNC: 3.3 MMOL/L (ref 3.5–5.2)
PROT UR QL STRIP: NEGATIVE
RBC # BLD AUTO: 4.07 10*6/MM3 (ref 4.14–5.8)
RBC # UR: ABNORMAL /HPF
REF LAB TEST METHOD: ABNORMAL
SODIUM SERPL-SCNC: 140 MMOL/L (ref 136–145)
SP GR UR STRIP: 1.01 (ref 1–1.03)
SQUAMOUS #/AREA URNS HPF: ABNORMAL /HPF
UROBILINOGEN UR QL STRIP: ABNORMAL
WBC # BLD AUTO: 6.12 10*3/MM3 (ref 3.4–10.8)
WBC UR QL AUTO: ABNORMAL /HPF

## 2021-04-14 PROCEDURE — 63710000001 INSULIN GLARGINE PER 5 UNITS: Performed by: HOSPITALIST

## 2021-04-14 PROCEDURE — 80069 RENAL FUNCTION PANEL: CPT | Performed by: INTERNAL MEDICINE

## 2021-04-14 PROCEDURE — 81001 URINALYSIS AUTO W/SCOPE: CPT | Performed by: NURSE PRACTITIONER

## 2021-04-14 PROCEDURE — 99232 SBSQ HOSP IP/OBS MODERATE 35: CPT | Performed by: INTERNAL MEDICINE

## 2021-04-14 PROCEDURE — 87186 SC STD MICRODIL/AGAR DIL: CPT | Performed by: NURSE PRACTITIONER

## 2021-04-14 PROCEDURE — 63710000001 INSULIN LISPRO (HUMAN) PER 5 UNITS: Performed by: HOSPITALIST

## 2021-04-14 PROCEDURE — 87077 CULTURE AEROBIC IDENTIFY: CPT | Performed by: NURSE PRACTITIONER

## 2021-04-14 PROCEDURE — 85025 COMPLETE CBC W/AUTO DIFF WBC: CPT | Performed by: INTERNAL MEDICINE

## 2021-04-14 PROCEDURE — 87086 URINE CULTURE/COLONY COUNT: CPT | Performed by: NURSE PRACTITIONER

## 2021-04-14 PROCEDURE — 25010000002 ENOXAPARIN PER 10 MG: Performed by: INTERNAL MEDICINE

## 2021-04-14 PROCEDURE — 82962 GLUCOSE BLOOD TEST: CPT

## 2021-04-14 RX ORDER — POTASSIUM CHLORIDE 750 MG/1
20 TABLET, FILM COATED, EXTENDED RELEASE ORAL ONCE
Status: COMPLETED | OUTPATIENT
Start: 2021-04-14 | End: 2021-04-14

## 2021-04-14 RX ORDER — INSULIN LISPRO 100 [IU]/ML
3 INJECTION, SOLUTION INTRAVENOUS; SUBCUTANEOUS
Status: DISCONTINUED | OUTPATIENT
Start: 2021-04-14 | End: 2021-04-15

## 2021-04-14 RX ORDER — POTASSIUM CHLORIDE 750 MG/1
40 TABLET, FILM COATED, EXTENDED RELEASE ORAL ONCE
Status: COMPLETED | OUTPATIENT
Start: 2021-04-14 | End: 2021-04-14

## 2021-04-14 RX ORDER — METOLAZONE 5 MG/1
10 TABLET ORAL ONCE
Status: COMPLETED | OUTPATIENT
Start: 2021-04-14 | End: 2021-04-14

## 2021-04-14 RX ADMIN — BUMETANIDE 2 MG: 0.25 INJECTION INTRAMUSCULAR; INTRAVENOUS at 06:06

## 2021-04-14 RX ADMIN — ACETAMINOPHEN 650 MG: 325 TABLET, FILM COATED ORAL at 12:05

## 2021-04-14 RX ADMIN — TAMSULOSIN HYDROCHLORIDE 0.4 MG: 0.4 CAPSULE ORAL at 08:22

## 2021-04-14 RX ADMIN — SODIUM CHLORIDE, PRESERVATIVE FREE 10 ML: 5 INJECTION INTRAVENOUS at 20:30

## 2021-04-14 RX ADMIN — INSULIN LISPRO 2 UNITS: 100 INJECTION, SOLUTION INTRAVENOUS; SUBCUTANEOUS at 12:05

## 2021-04-14 RX ADMIN — INSULIN LISPRO 3 UNITS: 100 INJECTION, SOLUTION INTRAVENOUS; SUBCUTANEOUS at 12:05

## 2021-04-14 RX ADMIN — INSULIN LISPRO 2 UNITS: 100 INJECTION, SOLUTION INTRAVENOUS; SUBCUTANEOUS at 16:51

## 2021-04-14 RX ADMIN — OXYMETAZOLINE HCL 2 SPRAY: 0.05 SPRAY NASAL at 08:23

## 2021-04-14 RX ADMIN — CARVEDILOL 12.5 MG: 12.5 TABLET, FILM COATED ORAL at 08:22

## 2021-04-14 RX ADMIN — OXYMETAZOLINE HCL 2 SPRAY: 0.05 SPRAY NASAL at 20:30

## 2021-04-14 RX ADMIN — ACETAMINOPHEN 650 MG: 325 TABLET, FILM COATED ORAL at 16:51

## 2021-04-14 RX ADMIN — POTASSIUM CHLORIDE 40 MEQ: 750 TABLET, EXTENDED RELEASE ORAL at 10:50

## 2021-04-14 RX ADMIN — BUMETANIDE 2 MG: 0.25 INJECTION INTRAMUSCULAR; INTRAVENOUS at 16:51

## 2021-04-14 RX ADMIN — DOCUSATE SODIUM 50MG AND SENNOSIDES 8.6MG 2 TABLET: 8.6; 5 TABLET, FILM COATED ORAL at 21:17

## 2021-04-14 RX ADMIN — CARVEDILOL 12.5 MG: 12.5 TABLET, FILM COATED ORAL at 18:41

## 2021-04-14 RX ADMIN — INSULIN GLARGINE 10 UNITS: 100 INJECTION, SOLUTION SUBCUTANEOUS at 08:21

## 2021-04-14 RX ADMIN — ACETAMINOPHEN 650 MG: 325 TABLET, FILM COATED ORAL at 22:35

## 2021-04-14 RX ADMIN — ASPIRIN 81 MG: 81 TABLET, COATED ORAL at 08:22

## 2021-04-14 RX ADMIN — BUMETANIDE 2 MG: 0.25 INJECTION INTRAMUSCULAR; INTRAVENOUS at 12:05

## 2021-04-14 RX ADMIN — ACETAMINOPHEN 650 MG: 325 TABLET, FILM COATED ORAL at 08:22

## 2021-04-14 RX ADMIN — ENOXAPARIN SODIUM 40 MG: 40 INJECTION SUBCUTANEOUS at 15:16

## 2021-04-14 RX ADMIN — INSULIN LISPRO 2 UNITS: 100 INJECTION, SOLUTION INTRAVENOUS; SUBCUTANEOUS at 08:22

## 2021-04-14 RX ADMIN — INSULIN LISPRO 3 UNITS: 100 INJECTION, SOLUTION INTRAVENOUS; SUBCUTANEOUS at 16:51

## 2021-04-14 RX ADMIN — POTASSIUM CHLORIDE 20 MEQ: 750 TABLET, EXTENDED RELEASE ORAL at 15:16

## 2021-04-14 RX ADMIN — TAMSULOSIN HYDROCHLORIDE 0.4 MG: 0.4 CAPSULE ORAL at 20:30

## 2021-04-14 RX ADMIN — SODIUM CHLORIDE, PRESERVATIVE FREE 10 ML: 5 INJECTION INTRAVENOUS at 08:23

## 2021-04-14 RX ADMIN — BUMETANIDE 2 MG: 0.25 INJECTION INTRAMUSCULAR; INTRAVENOUS at 22:44

## 2021-04-14 RX ADMIN — METOLAZONE 10 MG: 5 TABLET ORAL at 15:16

## 2021-04-14 NOTE — PROGRESS NOTES
[facility]  Papa Velasco MD     LOS: 0 days   Patient Care Team:  Dakota Avila MD as PCP - General (Family Medicine)  Morris Cai MD as Consulting Physician (Sleep Medicine)  Michaela Hylton MD as Consulting Physician (Cardiology)  Hardy Banerjee MD as Consulting Physician (Ophthalmology)  Chula Christianson MD as Consulting Physician (Ophthalmology)  Jason Sherman MD (Endocrinology)  Perla Mayen MD as Consulting Physician (Nephrology)  Federico Hebert MD as Consulting Physician (Pulmonary Disease)  Johan Dillard, LUC as Ambulatory  (Population Health)  Niraj Ravi MD as Consulting Physician (Orthopedic Surgery)  Papa Velasco MD as Consulting Physician (Urology)        Subjective     Interval History:     Patient Complaints: Urinary retention  History taken from: patient family In pretty good spirits today.  Tolerating his catheter without difficulty.  Is still essentially bedridden and nonambulatory.    Review of Systems:   All systems were reviewed and were negative except for ambulatory difficulty.  Inability to urinate.  No chest pain or shortness of air.    Objective     Vital Signs  Temp:  [98.2 °F (36.8 °C)-98.8 °F (37.1 °C)] 98.4 °F (36.9 °C)  Heart Rate:  [62-70] 62  Resp:  [18-20] 20  BP: (150-186)/() 160/83    Physical Exam:     General Appearance:    Alert, cooperative, in no acute distress   Head:    Normocephalic, without obvious abnormality, atraumatic   Eyes:            Lids and lashes normal, conjunctivae and sclerae normal,     no icterus, no pallor, corneas clear, PERRLA   Ears:    Ears appear intact with no abnormalities noted   Throat:   No oral lesions, no thrush, oral mucosa moist   Neck:   No adenopathy, supple, trachea midline,        Lungs:     Clear to auscultation,respirations regular, even and                unlabored    Heart:    Regular rhythm and normal rate, normal S1 and S2, no         murmur, no gallop,  no rub, no click   Chest Wall:    No abnormalities observed   Abdomen:     Normal bowel sounds, no masses, no organomegaly, soft     nontender, nondistended, no guarding, no rebound                tenderness   Genital/Rectal:    Catheter in place.  Urine output clear.   Extremities:   Moves all extremities well, bilateral lower extremity edema, no cyanosis, no           redness       Skin:   No bleeding, bruising or rash       Neurologic:   Cranial nerves 2 - 12 grossly intact, sensation intact,        Results Review:     I reviewed the patient's new clinical results.    Recent Results (from the past 24 hour(s))   Renal Function Panel    Collection Time: 04/14/21  3:28 AM    Specimen: Blood   Result Value Ref Range    Glucose 181 (H) 65 - 99 mg/dL    BUN 29 (H) 8 - 23 mg/dL    Creatinine 1.31 (H) 0.76 - 1.27 mg/dL    Sodium 140 136 - 145 mmol/L    Potassium 3.3 (L) 3.5 - 5.2 mmol/L    Chloride 97 (L) 98 - 107 mmol/L    CO2 31.7 (H) 22.0 - 29.0 mmol/L    Calcium 8.5 (L) 8.6 - 10.5 mg/dL    Albumin 3.30 (L) 3.50 - 5.20 g/dL    Phosphorus 3.7 2.5 - 4.5 mg/dL    Anion Gap 11.3 5.0 - 15.0 mmol/L    BUN/Creatinine Ratio 22.1 7.0 - 25.0    eGFR Non African Amer 54 (L) >60 mL/min/1.73   CBC Auto Differential    Collection Time: 04/14/21  3:28 AM    Specimen: Blood   Result Value Ref Range    WBC 6.12 3.40 - 10.80 10*3/mm3    RBC 4.07 (L) 4.14 - 5.80 10*6/mm3    Hemoglobin 12.5 (L) 13.0 - 17.7 g/dL    Hematocrit 37.6 37.5 - 51.0 %    MCV 92.4 79.0 - 97.0 fL    MCH 30.7 26.6 - 33.0 pg    MCHC 33.2 31.5 - 35.7 g/dL    RDW 14.1 12.3 - 15.4 %    RDW-SD 46.8 37.0 - 54.0 fl    MPV 10.9 6.0 - 12.0 fL    Platelets 192 140 - 450 10*3/mm3    Neutrophil % 67.6 42.7 - 76.0 %    Lymphocyte % 17.3 (L) 19.6 - 45.3 %    Monocyte % 10.0 5.0 - 12.0 %    Eosinophil % 4.4 0.3 - 6.2 %    Basophil % 0.5 0.0 - 1.5 %    Immature Grans % 0.2 0.0 - 0.5 %    Neutrophils, Absolute 4.14 1.70 - 7.00 10*3/mm3    Lymphocytes, Absolute 1.06 0.70 - 3.10  10*3/mm3    Monocytes, Absolute 0.61 0.10 - 0.90 10*3/mm3    Eosinophils, Absolute 0.27 0.00 - 0.40 10*3/mm3    Basophils, Absolute 0.03 0.00 - 0.20 10*3/mm3    Immature Grans, Absolute 0.01 0.00 - 0.05 10*3/mm3    nRBC 0.0 0.0 - 0.2 /100 WBC   POC Glucose Once    Collection Time: 04/14/21  5:55 AM    Specimen: Blood   Result Value Ref Range    Glucose 164 (H) 70 - 130 mg/dL   Urinalysis With Culture If Indicated - Urine, Catheter    Collection Time: 04/14/21  6:10 AM    Specimen: Urine, Catheter   Result Value Ref Range    Color, UA Yellow Yellow, Straw    Appearance, UA Cloudy (A) Clear    pH, UA 6.5 5.0 - 8.0    Specific Gravity, UA 1.008 1.005 - 1.030    Glucose, UA Negative Negative    Ketones, UA Negative Negative    Bilirubin, UA Negative Negative    Blood, UA Moderate (2+) (A) Negative    Protein, UA Negative Negative    Leuk Esterase, UA Large (3+) (A) Negative    Nitrite, UA Positive (A) Negative    Urobilinogen, UA 1.0 E.U./dL 0.2 - 1.0 E.U./dL   Urinalysis, Microscopic Only - Urine, Catheter    Collection Time: 04/14/21  6:10 AM    Specimen: Urine, Catheter   Result Value Ref Range    RBC, UA 3-5 (A) None Seen, 0-2 /HPF    WBC, UA 6-12 (A) None Seen, 0-2 /HPF    Bacteria, UA 4+ (A) None Seen /HPF    Squamous Epithelial Cells, UA 0-2 None Seen, 0-2 /HPF    Hyaline Casts, UA None Seen None Seen /LPF    Methodology Manual Light Microscopy    POC Glucose Once    Collection Time: 04/14/21 10:57 AM    Specimen: Blood   Result Value Ref Range    Glucose 196 (H) 70 - 130 mg/dL   POC Glucose Once    Collection Time: 04/14/21  4:00 PM    Specimen: Blood   Result Value Ref Range    Glucose 168 (H) 70 - 130 mg/dL       Medication Review:   Current Facility-Administered Medications:   •  acetaminophen (TYLENOL) tablet 650 mg, 650 mg, Oral, Q4H PRN, Cyril Sanchez MD, 650 mg at 04/14/21 1651  •  aspirin EC tablet 81 mg, 81 mg, Oral, Daily, Cyril Sanchez MD, 81 mg at 04/14/21 0999  •  bumetanide (BUMEX) injection 2  mg, 2 mg, Intravenous, Q6H, Cristóbal Mayen MD, 2 mg at 04/14/21 1651  •  carvedilol (COREG) tablet 12.5 mg, 12.5 mg, Oral, BID With Meals, Cyril Sanchez MD, 12.5 mg at 04/14/21 1841  •  dextrose (D50W) 25 g/ 50mL Intravenous Solution 25 g, 25 g, Intravenous, Q15 Min PRN, Lasha De La Rosa MD  •  dextrose (GLUTOSE) oral gel 15 g, 15 g, Oral, Q15 Min PRN, Lasha De La Rosa MD  •  enoxaparin (LOVENOX) syringe 40 mg, 40 mg, Subcutaneous, Q24H, Cyril Sanchez MD, 40 mg at 04/14/21 1516  •  glucagon (human recombinant) (GLUCAGEN DIAGNOSTIC) injection 1 mg, 1 mg, Subcutaneous, Q15 Min PRN, Lasha De La Rosa MD  •  insulin glargine (LANTUS, SEMGLEE) injection 10 Units, 10 Units, Subcutaneous, QAM, Lasha De La Rosa MD, 10 Units at 04/14/21 0821  •  insulin lispro (ADMELOG) injection 0-9 Units, 0-9 Units, Subcutaneous, TID With Meals, Lasha De La Rosa MD, 2 Units at 04/14/21 1651  •  insulin lispro (ADMELOG) injection 3 Units, 3 Units, Subcutaneous, TID With Meals, Lasha De La Rosa MD, 3 Units at 04/14/21 1651  •  melatonin tablet 3 mg, 3 mg, Oral, Nightly PRN, Alyssa Abbott APRN, 3 mg at 04/13/21 2210  •  nitroglycerin (NITROSTAT) SL tablet 0.4 mg, 0.4 mg, Sublingual, Q5 Min PRN, Cyril Sanchez MD  •  oxymetazoline (AFRIN) nasal spray 2 spray, 2 spray, Each Nare, BID, Alyssa Abbott APRN, 2 spray at 04/14/21 0823  •  sennosides-docusate (PERICOLACE) 8.6-50 MG per tablet 2 tablet, 2 tablet, Oral, BID PRN, Cyril Sanchez MD, 2 tablet at 04/13/21 0400  •  sodium chloride 0.9 % flush 10 mL, 10 mL, Intravenous, Q12H, Cyril Sanchez MD, 10 mL at 04/14/21 0823  •  sodium chloride 0.9 % flush 10 mL, 10 mL, Intravenous, PRN, Cyril Sanchez MD  •  tamsulosin (FLOMAX) 24 hr capsule 0.4 mg, 0.4 mg, Oral, BID, Cyril Sanchez MD, 0.4 mg at 04/14/21 0822    Assessment/Plan       Type 2 diabetes mellitus with hyperglycemia, with long-term current use of insulin (CMS/[facility])    CAD (coronary artery disease)    Acute on chronic diastolic CHF  (congestive heart failure) (CMS/HCC)    CSA (central sleep apnea)    HTN (hypertension)    Urinary retention    Acute on chronic renal failure (CMS/HCC)    Acute on chronic diastolic (congestive) heart failure (CMS/HCC)      BPH with obstruction    Plan for disposition:Continue with [facility] care.  I will follow-up with him next week if still hospitalized.    Papa Velasco MD  04/14/21  18:52 EDT      Time:

## 2021-04-14 NOTE — PROGRESS NOTES
PROGRESS NOTE      Name: Dilip Verma ADMIT: 2021   : 1950  PCP: Dakota Avila MD    MRN: [id number] LOS: 0 days   AGE/SEX: 71 y.o. male  ROOM: [facility]     Subjective .  INTERVAL History        Patient seen and examined.  Renal function stable  Persistent edema   Mild metabolic alkalosis  Good UOP        Medications Prior to Admission   Medication Sig Dispense Refill Last Dose   • aspirin 81 MG EC tablet Take 81 mg by mouth Daily.   2021 at Unknown time   • carvedilol (COREG) 12.5 MG tablet Take one tablet twice daily with meals 180 tablet 2 2021 at Unknown time   • Cholecalciferol (Vitamin D) 125 MCG (5000 UT) capsule capsule Take 5,000 Units by mouth Daily.   2021 at Unknown time   • furosemide (LASIX) 40 MG tablet Take 1.5 tablets by mouth 2 (Two) Times a Day.   2021 at Unknown time   • hydrALAZINE (APRESOLINE) 25 MG tablet Take 1 tablet by mouth 2 (Two) Times a Day As Needed (for SBP > 160). 90 tablet 0 2021 at Unknown time   • Insulin Glargine, 2 Unit Dial, (TOUJEO) 300 UNIT/ML solution pen-injector injection Inject 28 Units under the skin into the appropriate area as directed Daily.   2021 at Unknown time   • insulin lispro (humaLOG) 100 UNIT/ML injection Inject 0-14 Units under the skin into the appropriate area as directed 3 (Three) Times a Day Before Meals.  12 2021 at Unknown time   • melatonin 3 MG tablet Take 9 mg by mouth Every Night.   Patient Taking Differently at Unknown time   • Oxymetazoline HCl (AFRIN NASAL SPRAY NA) into the nostril(s) as directed by provider.   2021 at Unknown time   • tamsulosin (FLOMAX) 0.4 MG capsule 24 hr capsule TAKE 2 CAPSULES BY MOUTH AT BEDTIME  (Patient taking differently: Take 1 capsule by mouth 2 (Two) Times a Day.) 60 capsule 0 Patient Taking Differently at Unknown time   • sennosides-docusate (senna-docusate sodium) 8.6-50 MG per tablet Take 2 tablets by mouth 2 (two) times a day. 56 tablet 0 Unknown at  "Unknown time     Allergies:  Ace inhibitors, Clonidine derivatives, Losartan, Amlodipine, Ativan [lorazepam], Xanax [alprazolam], Minoxidil, and Tetracycline  REVIEW OF SYSTEMS  Constitutional: + weakness.  HEENT:           No headache, otalgia, itchy eyes, nasal discharge or sore throat.  Cardiac:           No chest pain, orthopnea or PND. +dyspnea  Chest:              No cough, phlegm or wheezing.  Abdomen:        No abdominal pain, nausea or vomiting.  Neuro:             No focal weakness, abnormal movements or seizure-like activity.  :                  History of difficulty urinating  Ext:                  + swelling  ROS was otherwise negative except as mentioned in the [facility].   Objective   PHYSICAL EXAM  /78   Pulse 62   Temp 98.2 °F (36.8 °C) (Oral)   Resp 18   Ht 175.3 cm (69\")   Wt 101 kg (222 lb 0.1 oz)   SpO2 96%   BMI 32.78 kg/m²   Intake/Output last 3 shifts:  I/O last 3 completed shifts:  In: 720 [P.O.:720]  Out: 5375 [Urine:5375]  Intake/Output this shift:  I/O this shift:  In: 240 [P.O.:240]  Out: 1600 [Urine:1600]    General:  Chronically ill appearing,  male in no acute distress.    Head:      Normocephalic and atraumatic.    Eyes:      PERRL/EOM intact, conjunctiva and sclera clear with out nystagmus.    Neck:      No masses, thyromegaly,  trachea central with normal respiratory effort and PMI displaced laterally  Lungs:    Clear bilaterally to auscultation.    Heart:      Regular rate and rhythm, no murmur no gallop  Abd:        Soft, nontender, not distended, bowel sounds positive, no shifting dullness.  Msk:        No deformity or scoliosis noted of thoracic or lumbar spine.    Pulses:   Pulses normal in all 4 extremities.    Extr:        No cyanosis or clubbing, ++ edema.    Neuro:    No focal deficits.   alert oriented x3  Skin:       Intact without lesions or rashes.    Psych:    Alert and cooperative; normal mood and affect; normal attention span and concentration.  "     LABS:    CBC    Results from last 7 days   Lab Units 04/14/21 0328 04/12/21  1452   WBC 10*3/mm3 6.12 6.44   HEMOGLOBIN g/dL 12.5* 12.9*   PLATELETS 10*3/mm3 192 199     BMP   Results from last 7 days   Lab Units 04/14/21 0328 04/13/21  0930 04/12/21  1521 04/09/21  1225   SODIUM mmol/L 140 142 140 139   POTASSIUM mmol/L 3.3* 3.6 3.7 3.7   CHLORIDE mmol/L 97* 101 101 98   CO2 mmol/L 31.7* 33.1* 27.7 30.4*   BUN mg/dL 29* 29* 31* 32*   CREATININE mg/dL 1.31* 1.41* 1.51* 1.72*   GLUCOSE mg/dL 181* 221* 177* 177*   PHOSPHORUS mg/dL 3.7 3.2  --   --      CMP   Results from last 7 days   Lab Units 04/14/21 0328 04/13/21 0930 04/12/21  1521 04/09/21  1225   SODIUM mmol/L 140 142 140 139   POTASSIUM mmol/L 3.3* 3.6 3.7 3.7   CHLORIDE mmol/L 97* 101 101 98   CO2 mmol/L 31.7* 33.1* 27.7 30.4*   BUN mg/dL 29* 29* 31* 32*   CREATININE mg/dL 1.31* 1.41* 1.51* 1.72*   GLUCOSE mg/dL 181* 221* 177* 177*   ALBUMIN g/dL 3.30* 3.30* 3.30* 3.60   BILIRUBIN mg/dL  --   --  2.5* 2.5*   ALK PHOS U/L  --   --  325* 364*   AST (SGOT) U/L  --   --  29 33   ALT (SGPT) U/L  --   --  23 21     ABG      Imaging Results (Last 24 Hours)     ** No results found for the last 24 hours. **          Results for orders placed during the hospital encounter of 04/02/21    Adult Transthoracic Echo Complete w/ Color, Spectral and Contrast if Necessary Per Protocol    Interpretation Summary  · Left ventricular ejection fraction appears to be 51 - 55%. Left ventricular systolic function is low normal. Normal left ventricular cavity size noted. Left ventricular wall thickness is consistent with moderate concentric hypertrophy. All left ventricular wall segments contract normally. Left ventricular diastolic function is consistent with (grade II w/high LAP) pseudonormalization.  · Calculated right ventricular systolic pressure from tricuspid regurgitation is 70.1 mmHg.      Assessment/Plan   Assessment:        Type 2 diabetes mellitus with  hyperglycemia, with long-term current use of insulin (CMS/[facility])    CAD (coronary artery disease)    Acute on chronic diastolic CHF (congestive heart failure) (CMS/[facility])    CSA (central sleep apnea)    HTN (hypertension)    Urinary retention    Acute on chronic renal failure (CMS/[facility])    Acute on chronic diastolic (congestive) heart failure (CMS/[facility])    · Acute kidney injury, with CKD, admission creatinine 1.51 on 4/12, creatinine 4/9 1.72, cr 1.12 on 3/19 Urine studies not available. AMENA possible pre-renal with CHF, fluid overload on exam, recent UTIs, less likely obstruction with light catheter in place draining urine.   · Chronic kidney disease, stage III, baseline creatinine noted around 1.3 on 08/2020. CKD most likely secondary to atherosclerotic disease.   · Acid/base stable  · Electrolytes  · Volume, overload on exam   · Echocardiogram  · Left ventricular ejection fraction appears to be 51 - 55%. Left ventricular systolic function is low normal. Normal left ventricular cavity size noted. Left ventricular wall thickness is consistent with moderate concentric hypertrophy. All left ventricular wall segments contract normally. Left ventricular diastolic function is consistent with (grade II w/high LAP) pseudonormalization.  · Right ventricular systolic pressure from tricuspid regurgitation is 70.1 mmHg  · BP, hypertensive - expect some improvement with aggressive diuresis   · Anemia  · Acute on chronic diastolic CHF  · Chronic right sided CHF, severe PHTN, RVSP 70mmHg  · CAD s/p CABG  · COCO,CSA on BiPap  · IDDM2  · Urinary retention, history of UTI  · History of orthopedic fractures, non-weight bearing status     Plan:      ? Renal function is improving. Expect to return to baseline.   ? Urine analysis grossly abnormal with hematuria and pyuria TNTC, lots of hyaline casts, 4+ bacteria, 3+ leukocyte esterase.   ? Urine protein/creatinine ratio 462mg/G  ? repeat urinalysis looks better fu  urine culture,  No current  symptoms, remains afebrile, with chronic indwelling light catheter, may have some colonization.  ? Alkalosis developing, will need aggressive diuresis, increased ECF volume   ? Supplement kcl  ? Continue   Bumex 2mg IV Q6hr  ? Will add metolazone 10 mg po now  ? Strict I/O  ? Daily weights - noted increase weight from yesterday  ? AM labs  ? Avoid nephrotoxic medications  ? Patient with indwelling light catheter  ? Cardiology input noted  ? Discussed with patient and spouse at bedside.   ? We will follow closely         Cristóbal Mayen MD  [facility]  4/14/2021  12:15 EDT

## 2021-04-14 NOTE — PROGRESS NOTES
"   LOS: 0 days   Patient Care Team:  Dakota Avila MD as PCP - General (Family Medicine)  Morris Cai MD as Consulting Physician (Sleep Medicine)  Michaela Hylton MD as Consulting Physician (Cardiology)  Hardy Banerjee MD as Consulting Physician (Ophthalmology)  Chula Christianson MD as Consulting Physician (Ophthalmology)  Jason Sherman MD (Endocrinology)  Perla Mayen MD as Consulting Physician (Nephrology)  Federico Hebert MD as Consulting Physician (Pulmonary Disease)  Johan Dillard RN as Ambulatory  (Population Health)  Niraj Ravi MD as Consulting Physician (Orthopedic Surgery)  Papa Velasco MD as Consulting Physician (Urology)    Chief Complaint: volume overload     Interval History: Numerous complaints -- continued pain in his left leg, poor sleeping.  Has his home BiPAP but isn't using it as it causes \"mouth farts.\"     Objective   Vital Signs  Temp:  [98.2 °F (36.8 °C)-98.8 °F (37.1 °C)] 98.4 °F (36.9 °C)  Heart Rate:  [62-70] 62  Resp:  [16-20] 20  BP: (150-186)/() 170/80    Intake/Output Summary (Last 24 hours) at 4/14/2021 1312  Last data filed at 4/14/2021 0943  Gross per 24 hour   Intake 480 ml   Output 4900 ml   Net -4420 ml       Last Weight and Admission Weight        04/14/21  0700   Weight: 101 kg (222 lb 0.1 oz)     Flowsheet Rows      First Filed Value   Admission Height  175.3 cm (69\") Documented at 04/12/2021 1335   Admission Weight  105 kg (231 lb 7.7 oz) Documented at 04/12/2021 1335        Physical Exam  Constitutional:       General: He is not in acute distress.  Eyes:      Conjunctiva/sclera: Conjunctivae normal.   Cardiovascular:      Rate and Rhythm: Normal rate.      Heart sounds: Murmur heard.   Systolic murmur is present with a grade of 1/6.        Comments: 2-3+ BLE to thighs; numerous skin tears/scabs  Pulmonary:      Effort: Pulmonary effort is normal.      Breath sounds: Examination of the right-lower field " reveals decreased breath sounds. Decreased breath sounds present.   Abdominal:      Palpations: Abdomen is soft.      Tenderness: There is abdominal tenderness.   Musculoskeletal:      Right lower leg: Edema present.      Left lower leg: Edema present.   Skin:     General: Skin is warm.      Coloration: Skin is pale.   Neurological:      Mental Status: Mental status is at baseline.   Psychiatric:      Comments: Odd affect, anxious       Results Review:      Results from last 7 days   Lab Units 04/14/21  0328 04/13/21  0930 04/12/21  1521   SODIUM mmol/L 140 142 140   POTASSIUM mmol/L 3.3* 3.6 3.7   CHLORIDE mmol/L 97* 101 101   CO2 mmol/L 31.7* 33.1* 27.7   BUN mg/dL 29* 29* 31*   CREATININE mg/dL 1.31* 1.41* 1.51*   GLUCOSE mg/dL 181* 221* 177*   CALCIUM mg/dL 8.5* 8.3* 8.4*         Results from last 7 days   Lab Units 04/14/21  0328 04/12/21  1452   WBC 10*3/mm3 6.12 6.44   HEMOGLOBIN g/dL 12.5* 12.9*   HEMATOCRIT % 37.6 38.5   PLATELETS 10*3/mm3 192 199                     I reviewed the patient's new clinical results.    I personally viewed and interpreted the patient's EKG/Telemetry data        Medication Review:   aspirin, 81 mg, Oral, Daily  bumetanide, 2 mg, Intravenous, Q6H  carvedilol, 12.5 mg, Oral, BID With Meals  enoxaparin, 40 mg, Subcutaneous, Q24H  insulin glargine, 10 Units, Subcutaneous, QAM  insulin lispro, 0-9 Units, Subcutaneous, TID With Meals  insulin lispro, 3 Units, Subcutaneous, TID With Meals  metOLazone, 10 mg, Oral, Once  oxymetazoline, 2 spray, Each Nare, BID  potassium chloride, 20 mEq, Oral, Once  sodium chloride, 10 mL, Intravenous, Q12H  tamsulosin, 0.4 mg, Oral, BID      Assessment/Plan     1. Acute on chronic diastolic CHF  2. Chronic right sided CHF/severe PHTN, RVSP 70mm Hg  3. Longstanding severe essential hypertension  4. CAD s/p CABG  5. AMENA on CKD, stabilizing  6. Combined COCO/CSA  7. IDDM2  8. Urinary retention, history of UTI  9. History of orthopedic fractures/non-weight  bearing status     *Continue IV bumetanide, follow labs and daily weights  *Appreciate /Nephrology/hospitalist assistance  *UA is abnormal but he has a chronic indwelling catheter, and is afebrile and has a normal WBC  *He reports body-wide pain; he does not take narcotics at home but is asking for them here.  I don't see any acute issue that would worsen his prior orthopedic pain, and I worry about respiratory depression with them.  He is not using his BiPAP either.    Cyril Sanchez MD  04/14/21  13:12 EDT

## 2021-04-14 NOTE — PLAN OF CARE
Goal Outcome Evaluation:  Plan of Care Reviewed With: patient  Progress: no change   VSS on 4L NC. Pain treated with tylenol. Wife at bedside, restless during the night. Did not sleep much. His wife says this is typical for him at home. IV Bumex and F/C. Will continue to monitor and follow MD orders throughout my shift.

## 2021-04-14 NOTE — PLAN OF CARE
Goal Outcome Evaluation:      Patient alert and orientated. Vitals stable. Receiving iv diuretics. Potassium given per orders. Dose of metalozone given. C/o pain- tylenol given x3 prn.

## 2021-04-14 NOTE — PROGRESS NOTES
Reviewed chart and attempted to interview pt. Arrived at pt room and pt was speaking with MD. Called Michell pt RN who was in pt room. Michell said she would be a while with pt. Access will f/u

## 2021-04-14 NOTE — CONSULTS
[facility]  Papa Velasco MD    Patient Care Team:  Dakota Avila MD as PCP - General (Family Medicine)  Morris Cai MD as Consulting Physician (Sleep Medicine)  Michaela Hylton MD as Consulting Physician (Cardiology)  Hardy Banerjee MD as Consulting Physician (Ophthalmology)  Chula Christianson MD as Consulting Physician (Ophthalmology)  Jason Sherman MD (Endocrinology)  Perla Mayen MD as Consulting Physician (Nephrology)  Federico Hebert MD as Consulting Physician (Pulmonary Disease)  Johan Dillard, LUC as Ambulatory  (Population Health)  Niraj Ravi MD as Consulting Physician (Orthopedic Surgery)  Papa Velasco MD as Consulting Physician (Urology)    Chief complaint: Urinary retention    Subjective .     History of present illness: This 70-year-old male has been followed for urinary retention developed after suffering a fall and fracturing his right foot.  Prior to this time he had a fairly good stream with nocturia x2.  He had some difficulty starting a stream with some pushing the start.    He was admitted for shortness of air and increased peripheral edema and weight gain.  Is undergoing treatment for congestive heart failure.    His wife is present.    He had his catheter changed about a week ago.  There is slight amount of hematuria afterwards but this cleared rapidly.    Review of Systems  All systems were reviewed and were negative except for increased peripheral edema.  Some shortness of air on exertion.  Weight gain secondary to edema.  Inability to urinate.  Essentially immobile syndrome.    History  Past Medical History:   Diagnosis Date   • AMENA (acute kidney injury) (CMS/[facility]) 12/3/2020   • Anxiety    • Chronic diastolic (congestive) heart failure (CMS/[facility])    • Chronic kidney disease     stones- had lithotripsy   • CKD (chronic kidney disease) stage 3, GFR 30-59 ml/min (CMS/[facility]) 12/1/2020   • Closed nondisplaced intertrochanteric  "fracture of left femur (CMS/[facility]) 12/1/2020   • Colon polyp    • Coronary artery disease     CABG 7/2019   • Diabetes mellitus, type II (CMS/[facility])    • Erectile dysfunction    • H/O bone density study never   • Hyperlipidemia    • Hypersomnia    • Hypertension    • Kidney stone    • Orthostasis    • Periodic breathing    • Routine eye exam 2015   • Sleep apnea     bipap   • Stroke (CMS/[facility])     \"slight stroke\"   ,   Past Surgical History:   Procedure Laterality Date   • CARDIAC CATHETERIZATION N/A 7/15/2019    Procedure: Coronary angiography;  Surgeon: Carrie Price MD;  Location:  [facility] CATH INVASIVE LOCATION;  Service: Cardiovascular   • CARDIAC CATHETERIZATION N/A 7/15/2019    Procedure: Left Heart Cath;  Surgeon: Carrie Price MD;  Location:  [facility];  Service: Cardiovascular   • CARDIAC CATHETERIZATION N/A 7/15/2019    Procedure: Left ventriculography;  Surgeon: Carrie Price MD;  [facility];  Service: Cardiovascular   • CARDIAC CATHETERIZATION  7/15/2019    Procedure: Functional [place];  Surgeon: Carrie Price MD;  Location[facility] LOC[facility];  Service: Cardiovascular   • CARDIAC CATHETERIZATION N/A 11/6/2019    Procedure: Right and Left Heart Cath;  Surgeon: Mya Smith MD;  [facility];  Service: Cardiovascular   • CARDIAC CATHETERIZATION N/A 11/6/2019    Procedure: Coronary angiography;  Surgeon: Mya Smith MD;  Location: [facility] CATH INVASIVE LOCATION;  Service: Cardiovascular   • CATARACT EXTRACTION EXTRACAPSULAR W/ INTRAOCULAR LENS IMPLANTATION Bilateral    • COLONOSCOPY  01/2015    dr [email or web address]   • CORONARY ARTERY BYPASS GRAFT N/A 7/18/2019    Procedure: INTRAOPERATIVE SHOAIB; STERNOTOMY CORONARY ARTERY BYPASS x 3  USING LEFT INTERNAL MAMMARY ARTERY GRAFT UTILIZING ENDOSCOPICALLY HARVESTED RIGHT GREATER SAPHENOUS VEIN AND PRP.;  Surgeon: Bill Devi MD;  Location: [facility] OR;  Service: " Cardiothoracic   • DENTAL PROCEDURE      3 surgeries inder implants   • FEMUR IM NAILING/RODDING Left 12/3/2020    Procedure: LEFT HIP INTRAMEDULLARY NAIL;  Surgeon: Niraj Ravi MD;  Location: [facility] OR;  Service: Orthopedic Spine;  Laterality: Left;   • HERNIA REPAIR      inguinal bilaterally   • KIDNEY STONE SURGERY      lithotripsy   ,   Family History   Problem Relation Age of Onset   • Depression Mother    • Cancer Mother         uterine   • Arrhythmia Mother    • Heart disease Father    • Hypertension Father    • Kidney disease Father    • Thyroid disease Father    • Depression Sister    • Alcohol abuse Brother    • Alcohol abuse Other    • Alcohol abuse Other    • Lung disease Other    • Thyroid disease Other    • Glaucoma Other    • Heart attack Other    • Stroke Maternal Grandmother    • Stroke Paternal Grandmother    ,   Social History     Tobacco Use   • Smoking status: Former Smoker     Packs/day: 0.75     Years: 16.00     Pack years: 12.00     Quit date:      Years since quittin.2   • Smokeless tobacco: Never Used   • Tobacco comment: Start age:39/Stopping age:47, 3/4 PPD   Substance Use Topics   • Alcohol use: No     Comment: caffeine use - 1 cup daily   • Drug use: No   ,   Medications Prior to Admission   Medication Sig Dispense Refill Last Dose   • aspirin 81 MG EC tablet Take 81 mg by mouth Daily.   2021 at Unknown time   • carvedilol (COREG) 12.5 MG tablet Take one tablet twice daily with meals 180 tablet 2 2021 at Unknown time   • Cholecalciferol (Vitamin D) 125 MCG (5000 UT) capsule capsule Take 5,000 Units by mouth Daily.   2021 at Unknown time   • furosemide (LASIX) 40 MG tablet Take 1.5 tablets by mouth 2 (Two) Times a Day.   2021 at Unknown time   • hydrALAZINE (APRESOLINE) 25 MG tablet Take 1 tablet by mouth 2 (Two) Times a Day As Needed (for SBP > 160). 90 tablet 0 2021 at Unknown time   • Insulin Glargine, 2 Unit Dial, (TOUJEO) 300 UNIT/ML  solution pen-injector injection Inject 28 Units under the skin into the appropriate area as directed Daily.   4/12/2021 at Unknown time   • insulin lispro (humaLOG) 100 UNIT/ML injection Inject 0-14 Units under the skin into the appropriate area as directed 3 (Three) Times a Day Before Meals.  12 4/11/2021 at Unknown time   • melatonin 3 MG tablet Take 9 mg by mouth Every Night.   Patient Taking Differently at Unknown time   • Oxymetazoline HCl (AFRIN NASAL SPRAY NA) into the nostril(s) as directed by provider.   4/11/2021 at Unknown time   • tamsulosin (FLOMAX) 0.4 MG capsule 24 hr capsule TAKE 2 CAPSULES BY MOUTH AT BEDTIME  (Patient taking differently: Take 1 capsule by mouth 2 (Two) Times a Day.) 60 capsule 0 Patient Taking Differently at Unknown time   • sennosides-docusate (senna-docusate sodium) 8.6-50 MG per tablet Take 2 tablets by mouth 2 (two) times a day. 56 tablet 0 Unknown at Unknown time   , Scheduled Meds:  aspirin, 81 mg, Oral, Daily  bumetanide, 2 mg, Intravenous, Q6H  carvedilol, 12.5 mg, Oral, BID With Meals  enoxaparin, 40 mg, Subcutaneous, Q24H  insulin glargine, 10 Units, Subcutaneous, QAM  insulin lispro, 0-9 Units, Subcutaneous, TID With Meals  sodium chloride, 10 mL, Intravenous, Q12H  tamsulosin, 0.4 mg, Oral, BID    , Continuous Infusions:   , PRN Meds:  •  acetaminophen  •  dextrose  •  dextrose  •  glucagon (human recombinant)  •  nitroglycerin  •  sennosides-docusate  •  sodium chloride, Allergies:  Ace inhibitors, Clonidine derivatives, Losartan, Amlodipine, Ativan [lorazepam], Xanax [alprazolam], Minoxidil, and Tetracycline and     Objective     Vital Signs   Temp:  [97.9 °F (36.6 °C)-98.6 °F (37 °C)] 98.2 °F (36.8 °C)  Heart Rate:  [64-70] 70  Resp:  [16-20] 20  BP: (150-186)/(76-93) 172/81    Physical Exam:     General Appearance:    Alert, cooperative, in no acute distress   Head:    Normocephalic, without obvious abnormality, atraumatic   Eyes:            Lids and lashes normal,  conjunctivae and sclerae normal, no   icterus, no pallor, corneas clear, PERRLA   Ears:    Ears appear intact with no abnormalities noted   Throat:   No oral lesions, no thrush, oral mucosa moist   Neck:   No adenopathy, supple, trachea midline,    Back:     No kyphosis present, no scoliosis present, no skin lesions,      erythema or scars, no tenderness to percussion or                   palpation,   range of motion normal   Lungs:     Clear to auscultation,respirations regular, even and                  unlabored    Heart:    Regular rhythm and normal rate, normal S1 and S2, no            murmur, no gallop, no rub, no click   Chest Wall:    No abnormalities observed   Abdomen:     Normal bowel sounds, no masses, no organomegaly, soft        non-tender, non-distended, no guarding, no rebound                tenderness   Genital/Rectal:    Some mild penile scrotal edema secondary to anasarca.  Catheter in place.  Urine output clear.   Extremities:   Moves all extremities well, bilateral lower extremity pitting edema, no cyanosis, no             redness       Skin:   No bleeding, bruising or rash       Neurologic:   Cranial nerves 2 - 12 grossly intact, sensation intact,       Results Review:   I reviewed the patient's new clinical results.      Assessment/Plan       Type 2 diabetes mellitus with hyperglycemia, with long-term current use of insulin (CMS/[facility])    CAD (coronary artery disease)    Acute on chronic diastolic CHF (congestive heart failure) (CMS/[facility])    CSA (central sleep apnea)    HTN (hypertension)    Urinary retention    Acute on chronic renal failure (CMS/HCC)    Acute on chronic diastolic (congestive) heart failure (CMS/HCC)      BPH with LUTS.    I discussed the patients findings and my recommendations with patient, family and I have discussed the technique of intermittent catheterization.  The patient is not up for it at this time.  I do not think he is a candidate for this technique presently.  He  gains more strength and becomes a little bit ambulatory he might be able to perform intermittent catheterization.  Continue with catheter changes for now.    Papa Velasco MD  04/13/21  20:05 EDT    Time: 40 including review of records, history and physical, discussions of plans with the patient and family.

## 2021-04-15 LAB
ANION GAP SERPL CALCULATED.3IONS-SCNC: 11.1 MMOL/L (ref 5–15)
BUN SERPL-MCNC: 31 MG/DL (ref 8–23)
BUN/CREAT SERPL: 21.7 (ref 7–25)
CALCIUM SPEC-SCNC: 8.4 MG/DL (ref 8.6–10.5)
CHLORIDE SERPL-SCNC: 96 MMOL/L (ref 98–107)
CO2 SERPL-SCNC: 32.9 MMOL/L (ref 22–29)
CREAT SERPL-MCNC: 1.43 MG/DL (ref 0.76–1.27)
GFR SERPL CREATININE-BSD FRML MDRD: 49 ML/MIN/1.73
GLUCOSE BLDC GLUCOMTR-MCNC: 170 MG/DL (ref 70–130)
GLUCOSE BLDC GLUCOMTR-MCNC: 181 MG/DL (ref 70–130)
GLUCOSE BLDC GLUCOMTR-MCNC: 184 MG/DL (ref 70–130)
GLUCOSE BLDC GLUCOMTR-MCNC: 241 MG/DL (ref 70–130)
GLUCOSE SERPL-MCNC: 257 MG/DL (ref 65–99)
POTASSIUM SERPL-SCNC: 3.7 MMOL/L (ref 3.5–5.2)
SODIUM SERPL-SCNC: 140 MMOL/L (ref 136–145)

## 2021-04-15 PROCEDURE — 25010000002 CEFTRIAXONE PER 250 MG: Performed by: INTERNAL MEDICINE

## 2021-04-15 PROCEDURE — 25010000002 ENOXAPARIN PER 10 MG: Performed by: INTERNAL MEDICINE

## 2021-04-15 PROCEDURE — 63710000001 INSULIN LISPRO (HUMAN) PER 5 UNITS: Performed by: HOSPITALIST

## 2021-04-15 PROCEDURE — 82962 GLUCOSE BLOOD TEST: CPT

## 2021-04-15 PROCEDURE — 99232 SBSQ HOSP IP/OBS MODERATE 35: CPT | Performed by: INTERNAL MEDICINE

## 2021-04-15 PROCEDURE — 63710000001 INSULIN GLARGINE PER 5 UNITS: Performed by: HOSPITALIST

## 2021-04-15 PROCEDURE — 80048 BASIC METABOLIC PNL TOTAL CA: CPT | Performed by: INTERNAL MEDICINE

## 2021-04-15 RX ORDER — INSULIN LISPRO 100 [IU]/ML
5 INJECTION, SOLUTION INTRAVENOUS; SUBCUTANEOUS
Status: DISCONTINUED | OUTPATIENT
Start: 2021-04-15 | End: 2021-04-17

## 2021-04-15 RX ORDER — INSULIN GLARGINE 100 [IU]/ML
15 INJECTION, SOLUTION SUBCUTANEOUS EVERY MORNING
Status: DISCONTINUED | OUTPATIENT
Start: 2021-04-16 | End: 2021-04-16

## 2021-04-15 RX ORDER — CEFTRIAXONE SODIUM 1 G/50ML
1 INJECTION, SOLUTION INTRAVENOUS EVERY 24 HOURS
Status: DISCONTINUED | OUTPATIENT
Start: 2021-04-15 | End: 2021-04-16

## 2021-04-15 RX ORDER — BISACODYL 10 MG
10 SUPPOSITORY, RECTAL RECTAL ONCE
Status: COMPLETED | OUTPATIENT
Start: 2021-04-15 | End: 2021-04-15

## 2021-04-15 RX ADMIN — TAMSULOSIN HYDROCHLORIDE 0.4 MG: 0.4 CAPSULE ORAL at 08:03

## 2021-04-15 RX ADMIN — OXYMETAZOLINE HCL 2 SPRAY: 0.05 SPRAY NASAL at 20:21

## 2021-04-15 RX ADMIN — ENOXAPARIN SODIUM 40 MG: 40 INJECTION SUBCUTANEOUS at 15:38

## 2021-04-15 RX ADMIN — INSULIN LISPRO 2 UNITS: 100 INJECTION, SOLUTION INTRAVENOUS; SUBCUTANEOUS at 11:43

## 2021-04-15 RX ADMIN — SODIUM CHLORIDE, PRESERVATIVE FREE 10 ML: 5 INJECTION INTRAVENOUS at 20:21

## 2021-04-15 RX ADMIN — ACETAMINOPHEN 650 MG: 325 TABLET, FILM COATED ORAL at 13:59

## 2021-04-15 RX ADMIN — BUMETANIDE 2 MG: 0.25 INJECTION INTRAMUSCULAR; INTRAVENOUS at 23:12

## 2021-04-15 RX ADMIN — ASPIRIN 81 MG: 81 TABLET, COATED ORAL at 08:03

## 2021-04-15 RX ADMIN — BUMETANIDE 2 MG: 0.25 INJECTION INTRAMUSCULAR; INTRAVENOUS at 17:14

## 2021-04-15 RX ADMIN — BUMETANIDE 2 MG: 0.25 INJECTION INTRAMUSCULAR; INTRAVENOUS at 11:43

## 2021-04-15 RX ADMIN — ACETAMINOPHEN 650 MG: 325 TABLET, FILM COATED ORAL at 05:59

## 2021-04-15 RX ADMIN — INSULIN LISPRO 4 UNITS: 100 INJECTION, SOLUTION INTRAVENOUS; SUBCUTANEOUS at 08:03

## 2021-04-15 RX ADMIN — INSULIN GLARGINE 10 UNITS: 100 INJECTION, SOLUTION SUBCUTANEOUS at 06:10

## 2021-04-15 RX ADMIN — INSULIN LISPRO 5 UNITS: 100 INJECTION, SOLUTION INTRAVENOUS; SUBCUTANEOUS at 17:14

## 2021-04-15 RX ADMIN — INSULIN LISPRO 3 UNITS: 100 INJECTION, SOLUTION INTRAVENOUS; SUBCUTANEOUS at 11:43

## 2021-04-15 RX ADMIN — CEFTRIAXONE SODIUM 1 G: 1 INJECTION, SOLUTION INTRAVENOUS at 11:44

## 2021-04-15 RX ADMIN — DOCUSATE SODIUM 50MG AND SENNOSIDES 8.6MG 2 TABLET: 8.6; 5 TABLET, FILM COATED ORAL at 06:10

## 2021-04-15 RX ADMIN — CARVEDILOL 12.5 MG: 12.5 TABLET, FILM COATED ORAL at 08:03

## 2021-04-15 RX ADMIN — BISACODYL 10 MG: 10 SUPPOSITORY RECTAL at 11:43

## 2021-04-15 RX ADMIN — SODIUM CHLORIDE, PRESERVATIVE FREE 10 ML: 5 INJECTION INTRAVENOUS at 08:04

## 2021-04-15 RX ADMIN — BUMETANIDE 2 MG: 0.25 INJECTION INTRAMUSCULAR; INTRAVENOUS at 05:59

## 2021-04-15 RX ADMIN — CARVEDILOL 12.5 MG: 12.5 TABLET, FILM COATED ORAL at 17:14

## 2021-04-15 RX ADMIN — INSULIN LISPRO 3 UNITS: 100 INJECTION, SOLUTION INTRAVENOUS; SUBCUTANEOUS at 08:04

## 2021-04-15 RX ADMIN — INSULIN LISPRO 2 UNITS: 100 INJECTION, SOLUTION INTRAVENOUS; SUBCUTANEOUS at 17:13

## 2021-04-15 RX ADMIN — TAMSULOSIN HYDROCHLORIDE 0.4 MG: 0.4 CAPSULE ORAL at 20:21

## 2021-04-15 NOTE — PROGRESS NOTES
"Access follow up:  Reviewed chart and interviewed pt.   Pt talkative during interview. Gave history of fall one breaking his hip in 3 places,, UTI's confinement in medical bed for many months. Pt processed anxiety and sadness due to catheter infection.  Pt spoke of being man of hannah and forward talking about getting well and mowing the lawn again . Pt feeling guilty about being sick so he can not live the life he wanted to give his wife Bertin. Discussed pt not at fault.  Pt asked if counseling would be beneficial or he was willing to try. Pt declined as \"I have been to two counselors Gerardo Jackson and Jose Lima. They were good but not for me and Ketamine was horrible.   I have few good friends and  of his [employer] to talk to. Pt has \"no\" SI or HI.  Pt would like for Access to follow    "

## 2021-04-15 NOTE — PROGRESS NOTES
PROGRESS NOTE      Name: Dilip Verma ADMIT: 2021   : 1950  PCP: Dakota Avila MD    MRN: [id number] LOS: 1 days   AGE/SEX: 71 y.o. male  ROOM: [facility]     Subjective .  INTERVAL History        Patient seen and examined.  Renal function stable  Persistent edema but better  Great urine output 6 L, negative balance 5.4 L.  No labs this morning.  Urine cultures growing GNB        Medications Prior to Admission   Medication Sig Dispense Refill Last Dose   • aspirin 81 MG EC tablet Take 81 mg by mouth Daily.   2021 at Unknown time   • carvedilol (COREG) 12.5 MG tablet Take one tablet twice daily with meals 180 tablet 2 2021 at Unknown time   • Cholecalciferol (Vitamin D) 125 MCG (5000 UT) capsule capsule Take 5,000 Units by mouth Daily.   2021 at Unknown time   • furosemide (LASIX) 40 MG tablet Take 1.5 tablets by mouth 2 (Two) Times a Day.   2021 at Unknown time   • hydrALAZINE (APRESOLINE) 25 MG tablet Take 1 tablet by mouth 2 (Two) Times a Day As Needed (for SBP > 160). 90 tablet 0 2021 at Unknown time   • Insulin Glargine, 2 Unit Dial, (TOUJEO) 300 UNIT/ML solution pen-injector injection Inject 28 Units under the skin into the appropriate area as directed Daily.   2021 at Unknown time   • insulin lispro (humaLOG) 100 UNIT/ML injection Inject 0-14 Units under the skin into the appropriate area as directed 3 (Three) Times a Day Before Meals.  12 2021 at Unknown time   • melatonin 3 MG tablet Take 9 mg by mouth Every Night.   Patient Taking Differently at Unknown time   • Oxymetazoline HCl (AFRIN NASAL SPRAY NA) into the nostril(s) as directed by provider.   2021 at Unknown time   • tamsulosin (FLOMAX) 0.4 MG capsule 24 hr capsule TAKE 2 CAPSULES BY MOUTH AT BEDTIME  (Patient taking differently: Take 1 capsule by mouth 2 (Two) Times a Day.) 60 capsule 0 Patient Taking Differently at Unknown time   • sennosides-docusate (senna-docusate sodium) 8.6-50 MG per  "tablet Take 2 tablets by mouth 2 (two) times a day. 56 tablet 0 Unknown at Unknown time     Allergies:  Ace inhibitors, Clonidine derivatives, Losartan, Amlodipine, Ativan [lorazepam], Xanax [alprazolam], Minoxidil, and Tetracycline  REVIEW OF SYSTEMS  Constitutional: + weakness.  HEENT:           No headache, otalgia, itchy eyes, nasal discharge or sore throat.  Cardiac:           No chest pain, orthopnea or PND. +dyspnea  Chest:              No cough, phlegm or wheezing.  Abdomen:        No abdominal pain, nausea or vomiting.  Neuro:             No focal weakness, abnormal movements or seizure-like activity.  :                  History of difficulty urinating  Ext:                  + swelling  ROS was otherwise negative except as mentioned in the [place].   Objective   PHYSICAL EXAM  /87 (BP Location: Right arm, Patient Position: Lying)   Pulse 64   Temp 97.8 °F (36.6 °C) (Oral)   Resp 18   Ht 175.3 cm (69\")   Wt 95.6 kg (210 lb 12.2 oz)   SpO2 94%   BMI 31.12 kg/m²   Intake/Output last 3 shifts:  I/O last 3 completed shifts:  In: 840 [P.O.:840]  Out: 8225 [Urine:8225]  Intake/Output this shift:  No intake/output data recorded.    General:  Chronically ill appearing,  male in no acute distress.    Head:      Normocephalic and atraumatic.    Eyes:      PERRL/EOM intact, conjunctiva and sclera clear with out nystagmus.    Neck:      No masses, thyromegaly,  trachea central with normal respiratory effort and PMI displaced laterally  Lungs:    Clear bilaterally to auscultation.    Heart:      Regular rate and rhythm, no murmur no gallop  Abd:        Soft, nontender, not distended, bowel sounds positive, no shifting dullness.  Msk:        No deformity or scoliosis noted of thoracic or lumbar spine.    Pulses:   Pulses normal in all 4 extremities.    Extr:        No cyanosis or clubbing, ++ edema.    Neuro:    No focal deficits.   alert oriented x3  Skin:       Intact without lesions or rashes.    Psych:    " Alert and cooperative; normal mood and affect; normal attention span and concentration.      LABS:    CBC    Results from last 7 days   Lab Units 04/14/21  0328 04/12/21  1452   WBC 10*3/mm3 6.12 6.44   HEMOGLOBIN g/dL 12.5* 12.9*   PLATELETS 10*3/mm3 192 199     BMP   Results from last 7 days   Lab Units 04/14/21 0328 04/13/21  0930 04/12/21  1521 04/09/21  1225   SODIUM mmol/L 140 142 140 139   POTASSIUM mmol/L 3.3* 3.6 3.7 3.7   CHLORIDE mmol/L 97* 101 101 98   CO2 mmol/L 31.7* 33.1* 27.7 30.4*   BUN mg/dL 29* 29* 31* 32*   CREATININE mg/dL 1.31* 1.41* 1.51* 1.72*   GLUCOSE mg/dL 181* 221* 177* 177*   PHOSPHORUS mg/dL 3.7 3.2  --   --      CMP   Results from last 7 days   Lab Units 04/14/21 0328 04/13/21  0930 04/12/21  1521 04/09/21  1225   SODIUM mmol/L 140 142 140 139   POTASSIUM mmol/L 3.3* 3.6 3.7 3.7   CHLORIDE mmol/L 97* 101 101 98   CO2 mmol/L 31.7* 33.1* 27.7 30.4*   BUN mg/dL 29* 29* 31* 32*   CREATININE mg/dL 1.31* 1.41* 1.51* 1.72*   GLUCOSE mg/dL 181* 221* 177* 177*   ALBUMIN g/dL 3.30* 3.30* 3.30* 3.60   BILIRUBIN mg/dL  --   --  2.5* 2.5*   ALK PHOS U/L  --   --  325* 364*   AST (SGOT) U/L  --   --  29 33   ALT (SGPT) U/L  --   --  23 21     ABG      Imaging Results (Last 24 Hours)     ** No results found for the last 24 hours. **          Results for orders placed during the hospital encounter of 04/02/21    Adult Transthoracic Echo Complete w/ Color, Spectral and Contrast if Necessary Per Protocol    Interpretation Summary  · Left ventricular ejection fraction appears to be 51 - 55%. Left ventricular systolic function is low normal. Normal left ventricular cavity size noted. Left ventricular wall thickness is consistent with moderate concentric hypertrophy. All left ventricular wall segments contract normally. Left ventricular diastolic function is consistent with (grade II w/high LAP) pseudonormalization.  · Calculated right ventricular systolic pressure from tricuspid regurgitation is 70.1  mmHg.      Assessment/Plan   Assessment:        Type 2 diabetes mellitus with hyperglycemia, with long-term current use of insulin (CMS/[facility])    CAD (coronary artery disease)    Acute on chronic diastolic CHF (congestive heart failure) (CMS/[facility])    CSA (central sleep apnea)    HTN (hypertension)    Urinary retention    Acute on chronic renal failure (CMS/[facility])    Acute on chronic diastolic (congestive) heart failure (CMS/[facility])    · Acute kidney injury, with CKD, admission creatinine 1.51 on 4/12, creatinine 4/9 1.72, cr 1.12 on 3/19 Urine studies not available. AMENA possible pre-renal with CHF, fluid overload on exam, recent UTIs, less likely obstruction with light catheter in place draining urine.   · Chronic kidney disease, stage III, baseline creatinine noted around 1.3 on 08/2020. CKD most likely secondary to atherosclerotic disease.   · Acid/base stable  · Electrolytes  · Volume, overload on exam   · Echocardiogram  · Left ventricular ejection fraction appears to be 51 - 55%. Left ventricular systolic function is low normal. Normal left ventricular cavity size noted. Left ventricular wall thickness is consistent with moderate concentric hypertrophy. All left ventricular wall segments contract normally. Left ventricular diastolic function is consistent with (grade II w/high LAP) pseudonormalization.  · Right ventricular systolic pressure from tricuspid regurgitation is 70.1 mmHg  · BP, hypertensive - expect some improvement with aggressive diuresis   · Anemia  · Acute on chronic diastolic CHF  · GNB UTI  · Chronic right sided CHF, severe PHTN, RVSP 70mmHg  · CAD s/p CABG  · COCO,CSA on BiPap  · IDDM2  · Urinary retention, history of UTI  · History of orthopedic fractures, non-weight bearing status     Plan:      ? Renal function is improving.  Back to baseline.   ? Urine analysis grossly abnormal with hematuria and pyuria TNTC, lots of hyaline casts, 4+ bacteria, 3+ leukocyte esterase.   ? Urine protein/creatinine  ratio 462mg/G  ? repeat urinalysis looks better fu  urine culture,  No current symptoms, remains afebrile, with chronic indwelling light catheter, urine cultures growing more than 100,000 colonies of GNB  ? Will start rocephin 1 gm iv daily   ? urology eval noted, pt not ready for intermittent cath   ? Risk of infection d/w pt   ? Urology input appreciated  ? Spoke to Dr. De La Rosa.   ? Alkalosis developing, will need aggressive diuresis, increased ECF volume   ? Supplement kcl as needed.  ? Follow-up repeat labs today.  ? Continue   Bumex 2mg IV Q6hr  ? Patient is about 5.4 L negative balance.  ? Strict I/O  ? Daily weights - noted increase weight from yesterday  ? AM labs  ? Avoid nephrotoxic medications  ? Patient with indwelling light catheter  ? Cardiology input noted  ? Discussed with patient and spouse at bedside.   ? We will follow closely  ? Cardiology plan noted.  ? Thanks for consultation.         Cristóbal Mayen MD  [facility]  4/15/2021  08:35 EDT

## 2021-04-15 NOTE — PLAN OF CARE
Goal Outcome Evaluation:  Plan of Care Reviewed With: patient  Progress: improving  Outcome Summary: VSS on 1 L NC. A&O x 4. PRN tylenol given for pain as charted. PRN senna given this shift for constipation, pt had small BM after. Up to BSC x 2 assist. Wife at bedside throughout the night. Will CTM.

## 2021-04-15 NOTE — PROGRESS NOTES
Name: Dilip Verma ADMIT: 2021   : 1950  PCP: Dakota Avila MD    MRN: [id number] LOS: 1 days   AGE/SEX: 71 y.o. male  ROOM: [facility]     Subjective   Subjective   Tired, didn't sleep well.  No other new c/o.      Objective   Objective   Vital Signs  Temp:  [97.7 °F (36.5 °C)-98.4 °F (36.9 °C)] 97.8 °F (36.6 °C)  Heart Rate:  [63-71] 64  Resp:  [18-20] 18  BP: (160-174)/(77-87) 165/87  SpO2:  [94 %-95 %] 94 %  on  Flow (L/min):  [1] 1;   Device (Oxygen Therapy): nasal cannula  Body mass index is 31.12 kg/m².     Intake/Output Summary (Last 24 hours) at 4/15/2021 1217  Last data filed at 4/15/2021 1100  Gross per 24 hour   Intake 720 ml   Output 6100 ml   Net -5380 ml     Wt Readings from Last 1 Encounters:   04/15/21 0600 95.6 kg (210 lb 12.2 oz)   04/15/21 0500 95.6 kg (210 lb 12.2 oz)   21 0700 101 kg (222 lb 0.1 oz)   21 0500 106 kg (234 lb 2.1 oz)   21 1335 105 kg (231 lb 7.7 oz)     Wt Readings from Last 3 Encounters:   04/15/21 95.6 kg (210 lb 12.2 oz)   21 96.6 kg (213 lb)   21 96.6 kg (213 lb)       Physical Exam  Constitutional:       General: He is not in acute distress.     Appearance: He is not diaphoretic.   Cardiovascular:      Rate and Rhythm: Normal rate and regular rhythm.      Heart sounds: No murmur heard.     Pulmonary:      Effort: Pulmonary effort is normal.      Breath sounds: Normal breath sounds.   Abdominal:      General: Bowel sounds are normal. There is no distension.      Palpations: Abdomen is soft.      Tenderness: There is no abdominal tenderness.   Genitourinary:     Comments: + light  Musculoskeletal:      Right lower leg: Edema present.      Left lower leg: Edema present.   Skin:     General: Skin is warm and dry.   Neurological:      Mental Status: He is alert and oriented to person, place, and time.       Results Review:       I reviewed the patient's new clinical results.  Results from last 7 days   Lab Units 21  9988  04/12/21  1452   WBC 10*3/mm3 6.12 6.44   HEMOGLOBIN g/dL 12.5* 12.9*   PLATELETS 10*3/mm3 192 199     Results from last 7 days   Lab Units 04/15/21  0859 04/14/21 0328 04/13/21 0930 04/12/21  1521   SODIUM mmol/L 140 140 142 140   POTASSIUM mmol/L 3.7 3.3* 3.6 3.7   CHLORIDE mmol/L 96* 97* 101 101   CO2 mmol/L 32.9* 31.7* 33.1* 27.7   BUN mg/dL 31* 29* 29* 31*   CREATININE mg/dL 1.43* 1.31* 1.41* 1.51*   GLUCOSE mg/dL 257* 181* 221* 177*   Estimated Creatinine Clearance: 53.3 mL/min (A) (by C-G formula based on SCr of 1.43 mg/dL (H)).  Results from last 7 days   Lab Units 04/14/21 0328 04/13/21 0930 04/12/21  1521 04/09/21  1225   ALBUMIN g/dL 3.30* 3.30* 3.30* 3.60   BILIRUBIN mg/dL  --   --  2.5* 2.5*   ALK PHOS U/L  --   --  325* 364*   AST (SGOT) U/L  --   --  29 33   ALT (SGPT) U/L  --   --  23 21     Results from last 7 days   Lab Units 04/15/21  0859 04/14/21  0328 04/13/21  0930 04/12/21  1521 04/09/21  1225   CALCIUM mg/dL 8.4* 8.5* 8.3* 8.4* 9.2   ALBUMIN g/dL  --  3.30* 3.30* 3.30* 3.60   PHOSPHORUS mg/dL  --  3.7 3.2  --   --        Hemoglobin A1C   Date/Time Value Ref Range Status   04/12/2021 1452 7.60 (H) 4.80 - 5.60 % Final     Glucose   Date/Time Value Ref Range Status   04/15/2021 1111 181 (H) 70 - 130 mg/dL Final   04/15/2021 0602 241 (H) 70 - 130 mg/dL Final   04/14/2021 2143 187 (H) 70 - 130 mg/dL Final   04/14/2021 1600 168 (H) 70 - 130 mg/dL Final   04/14/2021 1057 196 (H) 70 - 130 mg/dL Final   04/14/2021 0555 164 (H) 70 - 130 mg/dL Final   04/13/2021 1638 182 (H) 70 - 130 mg/dL Final       aspirin, 81 mg, Oral, Daily  bumetanide, 2 mg, Intravenous, Q6H  carvedilol, 12.5 mg, Oral, BID With Meals  cefTRIAXone, 1 g, Intravenous, Q24H  enoxaparin, 40 mg, Subcutaneous, Q24H  insulin glargine, 10 Units, Subcutaneous, QAM  insulin lispro, 0-9 Units, Subcutaneous, TID With Meals  insulin lispro, 3 Units, Subcutaneous, TID With Meals  oxymetazoline, 2 spray, Each Nare, BID  sodium chloride, 10  mL, Intravenous, Q12H  tamsulosin, 0.4 mg, Oral, BID       Diet Regular; Consistent Carbohydrate, Low Sodium; No Added Salt       Assessment/Plan     Active Hospital Problems    Diagnosis  POA   • Acute on chronic diastolic (congestive) heart failure (CMS/[facility]) [I50.33]  Yes   • Acute on chronic renal failure (CMS/[facility]) [N17.9, N18.9]  Unknown   • Urinary retention [R33.9]  Yes   • HTN (hypertension) [I10]  Yes   • CSA (central sleep apnea) [G47.31]  Yes   • Acute on chronic diastolic CHF (congestive heart failure) (CMS/[facility]) [I50.33]  Unknown   • CAD (coronary artery disease) [I25.10]  Yes   • Type 2 diabetes mellitus with hyperglycemia, with long-term current use of insulin (CMS/[facility]) [E11.65, Z79.4]  Not Applicable      Resolved Hospital Problems   No resolved problems to display.       Type 2 diabetes mellitus with hyperglycemia, with long-term current use of insulin     Plan increase basal/AC insulin.  A1c 7.6%, no plans to change regimen at discharge.   Moderate dose SSI.  Will add AC insulin.  Continue daily monitoring and make adjustments as needed.    Defer abnormal UA treatment to renal/.  Likely has asymptomatic bacteriuria due to chronic [place].      Lasha De La Rosa MD  [facility]  04/15/21  12:17 EDT

## 2021-04-15 NOTE — PROGRESS NOTES
"   LOS: 1 day   Patient Care Team:  Dakota Avila MD as PCP - General (Family Medicine)  Morris Cai MD as Consulting Physician (Sleep Medicine)  Michaela Hylton MD as Consulting Physician (Cardiology)  Hardy Banerjee MD as Consulting Physician (Ophthalmology)  Chula Christianson MD as Consulting Physician (Ophthalmology)  Jason Sherman MD (Endocrinology)  Perla Mayen MD as Consulting Physician (Nephrology)  Federico Hebert MD as Consulting Physician (Pulmonary Disease)  Johan Dillard RN as Ambulatory  (Population Health)  Niraj Ravi MD as Consulting Physician (Orthopedic Surgery)  Papa Velasco MD as Consulting Physician (Urology)    Chief Complaint: volume overload     Interval History: Constipated, not sleeping, requesting sleep agents. Has his home BiPAP but isn't using it as it causes \"facial flatulence.\" Profound diuresis.     Objective   Vital Signs  Temp:  [97.7 °F (36.5 °C)-98.1 °F (36.7 °C)] 98.1 °F (36.7 °C)  Heart Rate:  [63-71] 65  Resp:  [18] 18  BP: (128-174)/(68-87) 128/68    Intake/Output Summary (Last 24 hours) at 4/15/2021 1426  Last data filed at 4/15/2021 1312  Gross per 24 hour   Intake 480 ml   Output 6800 ml   Net -6320 ml       Last Weight and Admission Weight        04/15/21  0600   Weight: 95.6 kg (210 lb 12.2 oz)     Flowsheet Rows      First Filed Value   Admission Height  175.3 cm (69\") Documented at 04/12/2021 1335   Admission Weight  105 kg (231 lb 7.7 oz) Documented at 04/12/2021 1335        Physical Exam  Constitutional:       General: He is not in acute distress.  Eyes:      Conjunctiva/sclera: Conjunctivae normal.   Cardiovascular:      Rate and Rhythm: Normal rate.      Heart sounds: Murmur heard.   Systolic murmur is present with a grade of 1/6.        Comments: 2-3+ BLE to thighs; numerous skin tears/scabs  Pulmonary:      Effort: Pulmonary effort is normal.      Breath sounds: Examination of the right-lower field " reveals decreased breath sounds. Decreased breath sounds present.   Abdominal:      Palpations: Abdomen is soft.      Tenderness: There is abdominal tenderness.   Musculoskeletal:      Right lower leg: Edema present.      Left lower leg: Edema present.   Skin:     General: Skin is warm.      Coloration: Skin is pale.   Neurological:      Mental Status: Mental status is at baseline.   Psychiatric:      Comments: Odd affect, anxious       Results Review:      Results from last 7 days   Lab Units 04/15/21  0859 04/14/21  0328 04/13/21  0930   SODIUM mmol/L 140 140 142   POTASSIUM mmol/L 3.7 3.3* 3.6   CHLORIDE mmol/L 96* 97* 101   CO2 mmol/L 32.9* 31.7* 33.1*   BUN mg/dL 31* 29* 29*   CREATININE mg/dL 1.43* 1.31* 1.41*   GLUCOSE mg/dL 257* 181* 221*   CALCIUM mg/dL 8.4* 8.5* 8.3*         Results from last 7 days   Lab Units 04/14/21  0328 04/12/21  1452   WBC 10*3/mm3 6.12 6.44   HEMOGLOBIN g/dL 12.5* 12.9*   HEMATOCRIT % 37.6 38.5   PLATELETS 10*3/mm3 192 199                     I reviewed the patient's new clinical results.    I personally viewed and interpreted the patient's EKG/Telemetry data        Medication Review:   aspirin, 81 mg, Oral, Daily  bumetanide, 2 mg, Intravenous, Q6H  carvedilol, 12.5 mg, Oral, BID With Meals  cefTRIAXone, 1 g, Intravenous, Q24H  enoxaparin, 40 mg, Subcutaneous, Q24H  [START ON 4/16/2021] insulin glargine, 15 Units, Subcutaneous, QAM  insulin lispro, 0-9 Units, Subcutaneous, TID With Meals  insulin lispro, 5 Units, Subcutaneous, TID With Meals  oxymetazoline, 2 spray, Each Nare, BID  sodium chloride, 10 mL, Intravenous, Q12H  tamsulosin, 0.4 mg, Oral, BID      Assessment/Plan     1. Acute on chronic diastolic CHF  2. Chronic right sided CHF/severe PHTN, RVSP 70mm Hg  3. Longstanding severe essential hypertension  4. CAD s/p CABG  5. AMENA on CKD, stabilizing  6. Combined COCO/CSA  7. IDDM2  8. Urinary retention, history of UTI  9. History of orthopedic fractures/non-weight bearing  status     *Continue IV bumetanide, follow labs and daily weights but doubt the accuracy of the bed scale (he can't stand to weigh); he had 6.4L of UOP in 24 hours!  *Appreciate /Nephrology/hospitalist assistance  *UA is abnormal but he has a chronic indwelling catheter, and is afebrile and has a normal WBC; nephrology started ceftriaxone  *He reports body-wide pain; he does not take narcotics at home but is asking for them here.  I don't see any acute issue that would worsen his prior orthopedic pain, and I worry about respiratory depression with them.  He is not using his BiPAP either. I worry about sleep meds as they can also cause respiratory depression.  We'll see if RT has any better fitting masks for BiPAP.     Cyril Sanchez MD  04/15/21  14:26 EDT

## 2021-04-16 ENCOUNTER — APPOINTMENT (OUTPATIENT)
Dept: GENERAL RADIOLOGY | Facility: HOSPITAL | Age: 71
End: 2021-04-16

## 2021-04-16 PROBLEM — K62.89 RECTAL PAIN: Status: ACTIVE | Noted: 2021-04-16

## 2021-04-16 PROBLEM — R82.71 BACTERIURIA: Status: ACTIVE | Noted: 2021-04-16

## 2021-04-16 LAB
ANION GAP SERPL CALCULATED.3IONS-SCNC: 8.8 MMOL/L (ref 5–15)
BACTERIA SPEC AEROBE CULT: ABNORMAL
BUN SERPL-MCNC: 33 MG/DL (ref 8–23)
BUN/CREAT SERPL: 26 (ref 7–25)
CALCIUM SPEC-SCNC: 9.1 MG/DL (ref 8.6–10.5)
CHLORIDE SERPL-SCNC: 93 MMOL/L (ref 98–107)
CO2 SERPL-SCNC: 36.2 MMOL/L (ref 22–29)
CREAT SERPL-MCNC: 1.27 MG/DL (ref 0.76–1.27)
GFR SERPL CREATININE-BSD FRML MDRD: 56 ML/MIN/1.73
GLUCOSE BLDC GLUCOMTR-MCNC: 170 MG/DL (ref 70–130)
GLUCOSE BLDC GLUCOMTR-MCNC: 196 MG/DL (ref 70–130)
GLUCOSE BLDC GLUCOMTR-MCNC: 208 MG/DL (ref 70–130)
GLUCOSE BLDC GLUCOMTR-MCNC: 220 MG/DL (ref 70–130)
GLUCOSE SERPL-MCNC: 207 MG/DL (ref 65–99)
MAGNESIUM SERPL-MCNC: 1.9 MG/DL (ref 1.6–2.4)
POTASSIUM SERPL-SCNC: 3.4 MMOL/L (ref 3.5–5.2)
PSA SERPL-MCNC: 0.69 NG/ML (ref 0–4)
QT INTERVAL: 451 MS
SARS-COV-2 RNA RESP QL NAA+PROBE: NOT DETECTED
SODIUM SERPL-SCNC: 138 MMOL/L (ref 136–145)

## 2021-04-16 PROCEDURE — 73630 X-RAY EXAM OF FOOT: CPT

## 2021-04-16 PROCEDURE — 63710000001 INSULIN GLARGINE PER 5 UNITS: Performed by: HOSPITALIST

## 2021-04-16 PROCEDURE — 94660 CPAP INITIATION&MGMT: CPT

## 2021-04-16 PROCEDURE — U0003 INFECTIOUS AGENT DETECTION BY NUCLEIC ACID (DNA OR RNA); SEVERE ACUTE RESPIRATORY SYNDROME CORONAVIRUS 2 (SARS-COV-2) (CORONAVIRUS DISEASE [COVID-19]), AMPLIFIED PROBE TECHNIQUE, MAKING USE OF HIGH THROUGHPUT TECHNOLOGIES AS DESCRIBED BY CMS-2020-01-R: HCPCS | Performed by: INTERNAL MEDICINE

## 2021-04-16 PROCEDURE — 73610 X-RAY EXAM OF ANKLE: CPT

## 2021-04-16 PROCEDURE — 94799 UNLISTED PULMONARY SVC/PX: CPT

## 2021-04-16 PROCEDURE — 83735 ASSAY OF MAGNESIUM: CPT | Performed by: INTERNAL MEDICINE

## 2021-04-16 PROCEDURE — 80048 BASIC METABOLIC PNL TOTAL CA: CPT | Performed by: INTERNAL MEDICINE

## 2021-04-16 PROCEDURE — 99233 SBSQ HOSP IP/OBS HIGH 50: CPT | Performed by: INTERNAL MEDICINE

## 2021-04-16 PROCEDURE — 84153 ASSAY OF PSA TOTAL: CPT | Performed by: INTERNAL MEDICINE

## 2021-04-16 PROCEDURE — 82962 GLUCOSE BLOOD TEST: CPT

## 2021-04-16 PROCEDURE — 93010 ELECTROCARDIOGRAM REPORT: CPT | Performed by: INTERNAL MEDICINE

## 2021-04-16 PROCEDURE — 25010000002 CEFTRIAXONE PER 250 MG: Performed by: INTERNAL MEDICINE

## 2021-04-16 PROCEDURE — 63710000001 INSULIN LISPRO (HUMAN) PER 5 UNITS: Performed by: HOSPITALIST

## 2021-04-16 PROCEDURE — 99221 1ST HOSP IP/OBS SF/LOW 40: CPT | Performed by: SURGERY

## 2021-04-16 PROCEDURE — 25010000002 ENOXAPARIN PER 10 MG: Performed by: INTERNAL MEDICINE

## 2021-04-16 PROCEDURE — 74018 RADEX ABDOMEN 1 VIEW: CPT

## 2021-04-16 PROCEDURE — 93005 ELECTROCARDIOGRAM TRACING: CPT | Performed by: INTERNAL MEDICINE

## 2021-04-16 RX ORDER — INSULIN GLARGINE 100 [IU]/ML
20 INJECTION, SOLUTION SUBCUTANEOUS EVERY MORNING
Status: DISCONTINUED | OUTPATIENT
Start: 2021-04-17 | End: 2021-04-17

## 2021-04-16 RX ORDER — BISACODYL 10 MG
10 SUPPOSITORY, RECTAL RECTAL DAILY
Status: DISCONTINUED | OUTPATIENT
Start: 2021-04-16 | End: 2021-04-23 | Stop reason: HOSPADM

## 2021-04-16 RX ORDER — POTASSIUM CHLORIDE 750 MG/1
40 TABLET, FILM COATED, EXTENDED RELEASE ORAL EVERY 4 HOURS
Status: COMPLETED | OUTPATIENT
Start: 2021-04-16 | End: 2021-04-16

## 2021-04-16 RX ADMIN — INSULIN LISPRO 5 UNITS: 100 INJECTION, SOLUTION INTRAVENOUS; SUBCUTANEOUS at 12:47

## 2021-04-16 RX ADMIN — BISACODYL 10 MG: 10 SUPPOSITORY RECTAL at 18:26

## 2021-04-16 RX ADMIN — INSULIN LISPRO 2 UNITS: 100 INJECTION, SOLUTION INTRAVENOUS; SUBCUTANEOUS at 08:53

## 2021-04-16 RX ADMIN — CARVEDILOL 12.5 MG: 12.5 TABLET, FILM COATED ORAL at 08:51

## 2021-04-16 RX ADMIN — TAMSULOSIN HYDROCHLORIDE 0.4 MG: 0.4 CAPSULE ORAL at 20:29

## 2021-04-16 RX ADMIN — DOCUSATE SODIUM 50MG AND SENNOSIDES 8.6MG 2 TABLET: 8.6; 5 TABLET, FILM COATED ORAL at 08:58

## 2021-04-16 RX ADMIN — ASPIRIN 81 MG: 81 TABLET, COATED ORAL at 08:51

## 2021-04-16 RX ADMIN — INSULIN LISPRO 4 UNITS: 100 INJECTION, SOLUTION INTRAVENOUS; SUBCUTANEOUS at 12:48

## 2021-04-16 RX ADMIN — OXYMETAZOLINE HCL 2 SPRAY: 0.05 SPRAY NASAL at 08:51

## 2021-04-16 RX ADMIN — SODIUM CHLORIDE, PRESERVATIVE FREE 10 ML: 5 INJECTION INTRAVENOUS at 08:58

## 2021-04-16 RX ADMIN — ACETAMINOPHEN 650 MG: 325 TABLET, FILM COATED ORAL at 20:29

## 2021-04-16 RX ADMIN — ENOXAPARIN SODIUM 40 MG: 40 INJECTION SUBCUTANEOUS at 17:45

## 2021-04-16 RX ADMIN — BUMETANIDE 2 MG: 0.25 INJECTION INTRAMUSCULAR; INTRAVENOUS at 07:04

## 2021-04-16 RX ADMIN — CEFTRIAXONE SODIUM 1 G: 1 INJECTION, SOLUTION INTRAVENOUS at 12:46

## 2021-04-16 RX ADMIN — INSULIN LISPRO 5 UNITS: 100 INJECTION, SOLUTION INTRAVENOUS; SUBCUTANEOUS at 08:51

## 2021-04-16 RX ADMIN — INSULIN LISPRO 2 UNITS: 100 INJECTION, SOLUTION INTRAVENOUS; SUBCUTANEOUS at 17:48

## 2021-04-16 RX ADMIN — INSULIN LISPRO 5 UNITS: 100 INJECTION, SOLUTION INTRAVENOUS; SUBCUTANEOUS at 17:46

## 2021-04-16 RX ADMIN — SODIUM CHLORIDE, PRESERVATIVE FREE 10 ML: 5 INJECTION INTRAVENOUS at 20:29

## 2021-04-16 RX ADMIN — POTASSIUM CHLORIDE 40 MEQ: 750 TABLET, EXTENDED RELEASE ORAL at 12:45

## 2021-04-16 RX ADMIN — BISACODYL 10 MG: 10 SUPPOSITORY RECTAL at 12:46

## 2021-04-16 RX ADMIN — ACETAMINOPHEN 650 MG: 325 TABLET, FILM COATED ORAL at 06:04

## 2021-04-16 RX ADMIN — BUMETANIDE 2 MG: 0.25 INJECTION INTRAMUSCULAR; INTRAVENOUS at 17:45

## 2021-04-16 RX ADMIN — BUMETANIDE 2 MG: 0.25 INJECTION INTRAMUSCULAR; INTRAVENOUS at 12:46

## 2021-04-16 RX ADMIN — POTASSIUM CHLORIDE 40 MEQ: 750 TABLET, EXTENDED RELEASE ORAL at 17:45

## 2021-04-16 RX ADMIN — CARVEDILOL 12.5 MG: 12.5 TABLET, FILM COATED ORAL at 17:46

## 2021-04-16 RX ADMIN — BUMETANIDE 2 MG: 0.25 INJECTION INTRAMUSCULAR; INTRAVENOUS at 22:44

## 2021-04-16 RX ADMIN — TAMSULOSIN HYDROCHLORIDE 0.4 MG: 0.4 CAPSULE ORAL at 08:51

## 2021-04-16 RX ADMIN — INSULIN GLARGINE 15 UNITS: 100 INJECTION, SOLUTION SUBCUTANEOUS at 06:04

## 2021-04-16 NOTE — PROGRESS NOTES
Name: Dilip Verma ADMIT: 2021   : 1950  PCP: Dakota Avila MD    MRN: [id number] LOS: 2 days   AGE/SEX: 71 y.o. male  ROOM: [facility]     Subjective   Subjective   No new c/o     Objective   Objective   Vital Signs  Temp:  [97.8 °F (36.6 °C)-99 °F (37.2 °C)] 97.8 °F (36.6 °C)  Heart Rate:  [63-67] 64  Resp:  [18] 18  BP: (123-155)/(64-75) 155/75  SpO2:  [91 %-95 %] 94 %  on  Flow (L/min):  [1-2] 2;   Device (Oxygen Therapy): nasal cannula  Body mass index is 27.05 kg/m².     Intake/Output Summary (Last 24 hours) at 2021 1821  Last data filed at 2021 1700  Gross per 24 hour   Intake 960 ml   Output 5025 ml   Net -4065 ml     Wt Readings from Last 1 Encounters:   21 0500 83.1 kg (183 lb 3.2 oz)   04/15/21 0600 95.6 kg (210 lb 12.2 oz)   04/15/21 0500 95.6 kg (210 lb 12.2 oz)   21 0700 101 kg (222 lb 0.1 oz)   21 0500 106 kg (234 lb 2.1 oz)   21 1335 105 kg (231 lb 7.7 oz)     Wt Readings from Last 3 Encounters:   21 83.1 kg (183 lb 3.2 oz)   21 96.6 kg (213 lb)   21 96.6 kg (213 lb)       Physical Exam  Constitutional:       General: He is not in acute distress.     Appearance: He is not diaphoretic.   Cardiovascular:      Rate and Rhythm: Normal rate and regular rhythm.      Heart sounds: No murmur heard.     Pulmonary:      Effort: Pulmonary effort is normal.      Breath sounds: Normal breath sounds.   Abdominal:      General: Bowel sounds are normal. There is no distension.      Palpations: Abdomen is soft.      Tenderness: There is no abdominal tenderness.   Genitourinary:     Comments: + light  Musculoskeletal:      Right lower leg: Edema present.      Left lower leg: Edema present.   Skin:     General: Skin is warm and dry.   Neurological:      Mental Status: He is alert and oriented to person, place, and time.       Results Review:       I reviewed the patient's new clinical results.  Results from last 7 days   Lab Units 21  0322  04/12/21  1452   WBC 10*3/mm3 6.12 6.44   HEMOGLOBIN g/dL 12.5* 12.9*   PLATELETS 10*3/mm3 192 199     Results from last 7 days   Lab Units 04/16/21  0511 04/15/21  0859 04/14/21  0328 04/13/21  0930   SODIUM mmol/L 138 140 140 142   POTASSIUM mmol/L 3.4* 3.7 3.3* 3.6   CHLORIDE mmol/L 93* 96* 97* 101   CO2 mmol/L 36.2* 32.9* 31.7* 33.1*   BUN mg/dL 33* 31* 29* 29*   CREATININE mg/dL 1.27 1.43* 1.31* 1.41*   GLUCOSE mg/dL 207* 257* 181* 221*   Estimated Creatinine Clearance: 61.8 mL/min (by C-G formula based on SCr of 1.27 mg/dL).  Results from last 7 days   Lab Units 04/14/21 0328 04/13/21  0930 04/12/21  1521   ALBUMIN g/dL 3.30* 3.30* 3.30*   BILIRUBIN mg/dL  --   --  2.5*   ALK PHOS U/L  --   --  325*   AST (SGOT) U/L  --   --  29   ALT (SGPT) U/L  --   --  23     Results from last 7 days   Lab Units 04/16/21  0511 04/15/21  0859 04/14/21 0328 04/13/21 0930 04/12/21  1521   CALCIUM mg/dL 9.1 8.4* 8.5* 8.3* 8.4*   ALBUMIN g/dL  --   --  3.30* 3.30* 3.30*   MAGNESIUM mg/dL 1.9  --   --   --   --    PHOSPHORUS mg/dL  --   --  3.7 3.2  --        Glucose   Date/Time Value Ref Range Status   04/16/2021 1646 170 (H) 70 - 130 mg/dL Final   04/16/2021 1104 220 (H) 70 - 130 mg/dL Final   04/16/2021 0553 196 (H) 70 - 130 mg/dL Final   04/15/2021 2109 184 (H) 70 - 130 mg/dL Final   04/15/2021 1610 170 (H) 70 - 130 mg/dL Final   04/15/2021 1111 181 (H) 70 - 130 mg/dL Final   04/15/2021 0602 241 (H) 70 - 130 mg/dL Final       aspirin, 81 mg, Oral, Daily  bisacodyl, 10 mg, Rectal, Daily  bumetanide, 2 mg, Intravenous, Q6H  carvedilol, 12.5 mg, Oral, BID With Meals  enoxaparin, 40 mg, Subcutaneous, Q24H  insulin glargine, 15 Units, Subcutaneous, QAM  insulin lispro, 0-9 Units, Subcutaneous, TID With Meals  insulin lispro, 5 Units, Subcutaneous, TID With Meals  oxymetazoline, 2 spray, Each Nare, BID  sodium chloride, 10 mL, Intravenous, Q12H  tamsulosin, 0.4 mg, Oral, BID       Diet Regular; Consistent Carbohydrate, Low  Sodium; No Added Salt       Assessment/Plan     Active Hospital Problems    Diagnosis  POA   • Bacteriuria [R82.71]  Unknown   • Rectal pain [K62.89]  Unknown   • Acute on chronic diastolic (congestive) heart failure (CMS/[facility]) [I50.33]  Yes   • Acute on chronic renal failure (CMS/[facility]) [N17.9, N18.9]  Unknown   • Urinary retention [R33.9]  Yes   • HTN (hypertension) [I10]  Yes   • CSA (central sleep apnea) [G47.31]  Yes   • Acute on chronic diastolic CHF (congestive heart failure) (CMS/[facility]) [I50.33]  Unknown   • CAD (coronary artery disease) [I25.10]  Yes   • Type 2 diabetes mellitus with hyperglycemia, with long-term current use of insulin (CMS/[facility]) [E11.65, Z79.4]  Not Applicable      Resolved Hospital Problems   No resolved problems to display.       Type 2 diabetes mellitus with hyperglycemia, with long-term current use of insulin     Not eating great but BS remain elevated.  Will increase Lantus and keep lispro same.   A1c 7.6%, no plans to change regimen at discharge.  He follows with an endocrinologist.    Moderate dose SSI.    Continue daily monitoring and make adjustments as needed.    Abnormal UA c/w asymptomatic bacteruria.  PSA normal.  No antibiotics indicated per ID.       Lasha De La Rosa MD  [facility]  04/16/21  18:21 EDT

## 2021-04-16 NOTE — CONSULTS
[facility]   Consult Note    Patient Name: Dilip Verma  : 1950  MRN: [id number]  Primary Care Physician: Dakota Avila MD  Referring Physician: Amador Reyes Jr., MD  Date of admission: 2021    Inpatient General Surgery Consult  Consult performed by: Agustín Bernal Jr., MD  Consult ordered by: Cyril Sanchez MD        Subjective   Subjective     Reason for Consult/ Chief Complaint: Rectal pain    History of Present Illness     The patient is a pleasant 71-year-old male who was admitted on 2021 with fluid overload and dyspnea related to acute on chronic diastolic CHF.  He has multiple medical problems which are currently being managed.  He had a fall with a complicated left hip fracture that required operative repair.  He has had limited mobility since that time and has had urinary retention requiring a urinary catheter.  He has developed urinary tract infections related to the chronic catheter.  He is also complaining of rectal pain that comes and goes but is present more than it is not.  It is not severe but is continuously uncomfortable when it is present.  He has regular bowel movements and this does not seem to change his rectal pain.  He has not noticed any rectal bleeding.  He reported a colonoscopy about 5 years ago with no significant findings.    Review of Systems   Constitutional: Negative for fatigue and fever.   Respiratory: Negative for chest tightness and shortness of breath.    Cardiovascular: Negative for chest pain and palpitations.   Gastrointestinal: Positive for rectal pain. Negative for abdominal pain, anal bleeding, blood in stool, constipation, diarrhea, nausea and vomiting.        Personal History     Past Medical History:   Diagnosis Date   • MAENA (acute kidney injury) (CMS/HCC) 12/3/2020   • Anxiety    • Chronic diastolic (congestive) heart failure (CMS/HCC)    • Chronic kidney disease     stones- had lithotripsy   • CKD (chronic kidney disease) stage 3, GFR  "30-59 ml/min (CMS/[facility]) 12/1/2020   • Closed nondisplaced intertrochanteric fracture of left femur (CMS/HCC) 12/1/2020   • Colon polyp    • Coronary artery disease     CABG 7/2019   • Diabetes mellitus, type II (CMS/HCC)    • Erectile dysfunction    • H/O bone density study never   • Hyperlipidemia    • Hypersomnia    • Hypertension    • Kidney stone    • Orthostasis    • Periodic breathing    • Routine eye exam 2015   • Sleep apnea     bipap   • Stroke (CMS/HCC)     \"slight stroke\"       Past Surgical History:   Procedure Laterality Date   • CARDIAC CATHETERIZATION N/A 7/15/2019    Procedure: Coronary angiography;  Surgeon: Carrie Price MD;  Location:  [facility];  Service: Cardiovascular   • CARDIAC CATHETERIZATION N/A 7/15/2019    Procedure: Left Heart Cath;  Surgeon: Carrie Price MD;  Location:  [facility];  Service: Cardiovascular   • CARDIAC CATHETERIZATION N/A 7/15/2019    Procedure: Left ventriculography;  Surgeon: Carrie Price MD;  Location:  [facility];  Service: Cardiovascular   • CARDIAC CATHETERIZATION  7/15/2019    Procedure: Functional Flow [place];  Surgeon: Carrie Price MD;  Location:  [facility];  Service: Cardiovascular   • CARDIAC CATHETERIZATION N/A 11/6/2019    Procedure: Right and Left Heart Cath;  Surgeon: Mya Smith MD;  Location:  [facility];  Service: Cardiovascular   • CARDIAC CATHETERIZATION N/A 11/6/2019    Procedure: Coronary angiography;  Surgeon: Mya Smith MD;  Location:  [facility];  Service: Cardiovascular   • CATARACT EXTRACTION EXTRACAPSULAR W/ INTRAOCULAR LENS IMPLANTATION Bilateral    • COLONOSCOPY  01/2015    dr [email or web address]   • CORONARY ARTERY BYPASS GRAFT N/A 7/18/2019    Procedure: INTRAOPERATIVE SHOAIB; STERNOTOMY CORONARY ARTERY BYPASS x 3  USING LEFT INTERNAL MAMMARY ARTERY GRAFT UTILIZING ENDOSCOPICALLY HARVESTED RIGHT GREATER SAPHENOUS VEIN AND PRP.; "  Surgeon: Bill Devi MD;  Location: [facility] MAIN OR;  Service: Cardiothoracic   • DENTAL PROCEDURE      3 surgeries inder implants   • FEMUR IM NAILING/RODDING Left 12/3/2020    Procedure: LEFT HIP INTRAMEDULLARY NAIL;  Surgeon: Niraj Ravi MD;  Location: [facility] MAIN OR;  Service: Orthopedic Spine;  Laterality: Left;   • HERNIA REPAIR      inguinal bilaterally   • KIDNEY STONE SURGERY      lithotripsy       Family History: family history includes Alcohol abuse in his brother and other family members; Arrhythmia in his mother; Cancer in his mother; Depression in his mother and sister; Glaucoma in an other family member; Heart attack in an other family member; Heart disease in his father; Hypertension in his father; Kidney disease in his father; Lung disease in an other family member; Stroke in his maternal grandmother and paternal grandmother; Thyroid disease in his father and another family member. Otherwise pertinent FHx was reviewed and not pertinent to current issue.    Social History:  reports that he quit smoking about 21 years ago. He has a 12.00 pack-year smoking history. He has never used smokeless tobacco. He reports that he does not drink alcohol and does not use drugs.    Home Medications:  Insulin Glargine (2 Unit Dial), Oxymetazoline HCl, aspirin, carvedilol, furosemide, hydrALAZINE, insulin lispro, melatonin, sennosides-docusate, tamsulosin, and vitamin D3      Allergies:  Allergies   Allergen Reactions   • Ace Inhibitors Angioedema   • Clonidine Derivatives Unknown - High Severity     Passes out   • Losartan Angioedema     Angioneurotic edema   • Amlodipine Swelling   • Ativan [Lorazepam] Irritability   • Xanax [Alprazolam] Unknown - High Severity   • Minoxidil Confusion   • Tetracycline Other (See Comments)     bliisters in mouth         Objective    Objective   Vitals:  Temp:  [97.8 °F (36.6 °C)-99 °F (37.2 °C)] 97.8 °F (36.6 °C)  Heart Rate:  [63-67] 64  Resp:  [18] 18  BP:  (123-155)/(64-75) 155/75  Flow (L/min):  [1-2] 2    Physical Exam  Constitutional:       Appearance: Normal appearance. He is well-developed. He is not toxic-appearing.   Eyes:      General: No scleral icterus.  Pulmonary:      Effort: Pulmonary effort is normal. No respiratory distress.   Abdominal:      Palpations: Abdomen is soft.      Tenderness: There is no abdominal tenderness.   Genitourinary:     Comments: Rectal: Normal sphincter tone with no palpable masses.  There is brown stool recovered.  Skin:     General: Skin is warm and dry.   Neurological:      Mental Status: He is alert and oriented to person, place, and time.   Psychiatric:         Behavior: Behavior normal.         Thought Content: Thought content normal.         Judgment: Judgment normal.         Assessment/Plan   Assessment / Plan     Brief Patient Summary:  Dilip Verma is a 71 y.o. male who has persistent rectal pain.    Active Hospital Problems:  Active Hospital Problems    Diagnosis    • Bacteriuria    • Rectal pain    • Acute on chronic diastolic (congestive) heart failure (CMS/HCC)    • Acute on chronic renal failure (CMS/HCC)    • Urinary retention    • HTN (hypertension)    • CSA (central sleep apnea)    • Acute on chronic diastolic CHF (congestive heart failure) (CMS/HCC)    • CAD (coronary artery disease)    • Type 2 diabetes mellitus with hyperglycemia, with long-term current use of insulin (CMS/HCC)        Plan: The patient has rectal pain but a normal digital rectal exam.  His pain may be related to his urinary tract infection or prostatitis, as suggested by Dr. Nagel.  He will need a colonoscopy once his acute issues have improved.  I will follow peripherally.      Electronically signed by Agustín Bernal Jr., MD, 04/16/21, 2:52 PM EDT.

## 2021-04-16 NOTE — PROGRESS NOTES
PROGRESS NOTE      Name: Dilip Verma ADMIT: 2021   : 1950  PCP: Dakota Avila MD    MRN: [id number] LOS: 2 days   AGE/SEX: 71 y.o. male  ROOM: [address] .  INTERVAL History        Patient seen and examined.  Renal function stable  Persistent edema but better  Great urine output 3.9 L, negative balance 3.1 L.    Developing alkalosis   Urine cultures growing Klebsiella pneumoniae ESBL.  Resistant to Rocephin.        Medications Prior to Admission   Medication Sig Dispense Refill Last Dose   • aspirin 81 MG EC tablet Take 81 mg by mouth Daily.   2021 at Unknown time   • carvedilol (COREG) 12.5 MG tablet Take one tablet twice daily with meals 180 tablet 2 2021 at Unknown time   • Cholecalciferol (Vitamin D) 125 MCG (5000 UT) capsule capsule Take 5,000 Units by mouth Daily.   2021 at Unknown time   • furosemide (LASIX) 40 MG tablet Take 1.5 tablets by mouth 2 (Two) Times a Day.   2021 at Unknown time   • hydrALAZINE (APRESOLINE) 25 MG tablet Take 1 tablet by mouth 2 (Two) Times a Day As Needed (for SBP > 160). 90 tablet 0 2021 at Unknown time   • Insulin Glargine, 2 Unit Dial, (TOUJEO) 300 UNIT/ML solution pen-injector injection Inject 28 Units under the skin into the appropriate area as directed Daily.   2021 at Unknown time   • insulin lispro (humaLOG) 100 UNIT/ML injection Inject 0-14 Units under the skin into the appropriate area as directed 3 (Three) Times a Day Before Meals.  12 2021 at Unknown time   • melatonin 3 MG tablet Take 9 mg by mouth Every Night.   Patient Taking Differently at Unknown time   • Oxymetazoline HCl (AFRIN NASAL SPRAY NA) into the nostril(s) as directed by provider.   2021 at Unknown time   • tamsulosin (FLOMAX) 0.4 MG capsule 24 hr capsule TAKE 2 CAPSULES BY MOUTH AT BEDTIME  (Patient taking differently: Take 1 capsule by mouth 2 (Two) Times a Day.) 60 capsule 0 Patient Taking Differently at Unknown time   •  "sennosides-docusate (senna-docusate sodium) 8.6-50 MG per tablet Take 2 tablets by mouth 2 (two) times a day. 56 tablet 0 Unknown at Unknown time     Allergies:  Ace inhibitors, Clonidine derivatives, Losartan, Amlodipine, Ativan [lorazepam], Xanax [alprazolam], Minoxidil, and Tetracycline  REVIEW OF SYSTEMS  Constitutional: + weakness.  HEENT:           No headache, otalgia, itchy eyes, nasal discharge or sore throat.  Cardiac:           No chest pain, orthopnea or PND. +dyspnea  Chest:              No cough, phlegm or wheezing.  Abdomen:        No abdominal pain, nausea or vomiting.  Neuro:             No focal weakness, abnormal movements or seizure-like activity.  :                  History of difficulty urinating  Ext:                  + swelling  ROS was otherwise negative except as mentioned in the [place].   Objective   PHYSICAL EXAM  /66 (BP Location: Right arm, Patient Position: Lying)   Pulse 64   Temp 99 °F (37.2 °C) (Axillary)   Resp 18   Ht 175.3 cm (69\")   Wt 83.1 kg (183 lb 3.2 oz)   SpO2 91%   BMI 27.05 kg/m²   Intake/Output last 3 shifts:  I/O last 3 completed shifts:  In: 840 [P.O.:840]  Out: 7175 [Urine:7175]  Intake/Output this shift:  I/O this shift:  In: 240 [P.O.:240]  Out: -     General:  Chronically ill appearing,  male in no acute distress.    Head:      Normocephalic and atraumatic.    Eyes:      PERRL/EOM intact, conjunctiva and sclera clear with out nystagmus.    Neck:      No masses, thyromegaly,  trachea central with normal respiratory effort and PMI displaced laterally  Lungs:    Clear bilaterally to auscultation.    Heart:      Regular rate and rhythm, no murmur no gallop  Abd:        Soft, nontender, not distended, bowel sounds positive, no shifting dullness.  Msk:        No deformity or scoliosis noted of thoracic or lumbar spine.    Pulses:   Pulses normal in all 4 extremities.    Extr:        No cyanosis or clubbing, ++ edema.    Neuro:    No focal deficits.   alert " oriented x3  Skin:       Intact without lesions or rashes.    Psych:    Alert and cooperative; normal mood and affect; normal attention span and concentration.      LABS:    CBC    Results from last 7 days   Lab Units 04/14/21  0328 04/12/21  1452   WBC 10*3/mm3 6.12 6.44   HEMOGLOBIN g/dL 12.5* 12.9*   PLATELETS 10*3/mm3 192 199     BMP   Results from last 7 days   Lab Units 04/16/21  0511 04/15/21  0859 04/14/21 0328 04/13/21  0930 04/12/21  1521 04/09/21  1225   SODIUM mmol/L 138 140 140 142 140 139   POTASSIUM mmol/L 3.4* 3.7 3.3* 3.6 3.7 3.7   CHLORIDE mmol/L 93* 96* 97* 101 101 98   CO2 mmol/L 36.2* 32.9* 31.7* 33.1* 27.7 30.4*   BUN mg/dL 33* 31* 29* 29* 31* 32*   CREATININE mg/dL 1.27 1.43* 1.31* 1.41* 1.51* 1.72*   GLUCOSE mg/dL 207* 257* 181* 221* 177* 177*   MAGNESIUM mg/dL 1.9  --   --   --   --   --    PHOSPHORUS mg/dL  --   --  3.7 3.2  --   --      CMP   Results from last 7 days   Lab Units 04/16/21  0511 04/15/21  0859 04/14/21 0328 04/13/21 0930 04/12/21  1521 04/09/21  1225   SODIUM mmol/L 138 140 140 142 140 139   POTASSIUM mmol/L 3.4* 3.7 3.3* 3.6 3.7 3.7   CHLORIDE mmol/L 93* 96* 97* 101 101 98   CO2 mmol/L 36.2* 32.9* 31.7* 33.1* 27.7 30.4*   BUN mg/dL 33* 31* 29* 29* 31* 32*   CREATININE mg/dL 1.27 1.43* 1.31* 1.41* 1.51* 1.72*   GLUCOSE mg/dL 207* 257* 181* 221* 177* 177*   ALBUMIN g/dL  --   --  3.30* 3.30* 3.30* 3.60   BILIRUBIN mg/dL  --   --   --   --  2.5* 2.5*   ALK PHOS U/L  --   --   --   --  325* 364*   AST (SGOT) U/L  --   --   --   --  29 33   ALT (SGPT) U/L  --   --   --   --  23 21     ABG      Imaging Results (Last 24 Hours)     ** No results found for the last 24 hours. **          Results for orders placed during the hospital encounter of 04/02/21    Adult Transthoracic Echo Complete w/ Color, Spectral and Contrast if Necessary Per Protocol    Interpretation Summary  · Left ventricular ejection fraction appears to be 51 - 55%. Left ventricular systolic function is low  normal. Normal left ventricular cavity size noted. Left ventricular wall thickness is consistent with moderate concentric hypertrophy. All left ventricular wall segments contract normally. Left ventricular diastolic function is consistent with (grade II w/high LAP) pseudonormalization.  · Calculated right ventricular systolic pressure from tricuspid regurgitation is 70.1 mmHg.      Assessment/Plan   Assessment:        Type 2 diabetes mellitus with hyperglycemia, with long-term current use of insulin (CMS/[facility])    CAD (coronary artery disease)    Acute on chronic diastolic CHF (congestive heart failure) (CMS/[facility])    CSA (central sleep apnea)    HTN (hypertension)    Urinary retention    Acute on chronic renal failure (CMS/[facility])    Acute on chronic diastolic (congestive) heart failure (CMS/[facility])    · Acute kidney injury, with CKD, admission creatinine 1.51 on 4/12, creatinine 4/9 1.72, cr 1.12 on 3/19 Urine studies not available. AMENA possible pre-renal with CHF, fluid overload on exam, recent UTIs, less likely obstruction with light catheter in place draining urine.   · Chronic kidney disease, stage III, baseline creatinine noted around 1.3 on 08/2020. CKD most likely secondary to atherosclerotic disease.   · Acid/base stable  · Electrolytes  · Volume, overload on exam   · Echocardiogram  · Left ventricular ejection fraction appears to be 51 - 55%. Left ventricular systolic function is low normal. Normal left ventricular cavity size noted. Left ventricular wall thickness is consistent with moderate concentric hypertrophy. All left ventricular wall segments contract normally. Left ventricular diastolic function is consistent with (grade II w/high LAP) pseudonormalization.  · Right ventricular systolic pressure from tricuspid regurgitation is 70.1 mmHg  · BP, hypertensive - expect some improvement with aggressive diuresis   · Anemia  · Acute on chronic diastolic CHF  · GNB UTI  · Chronic right sided CHF, severe PHTN, RVSP  70mmHg  · CAD s/p CABG  · COCO,CSA on BiPap  · IDDM2  · Urinary retention, history of UTI  · History of orthopedic fractures, non-weight bearing status     Plan:      ? Renal function is improving.  Back to baseline.   ? Urine analysis grossly abnormal with hematuria and pyuria TNTC, lots of hyaline casts, 4+ bacteria, 3+ leukocyte esterase.   ? Urine protein/creatinine ratio 462mg/G  ? repeat urinalysis looks better fu  urine culture,  No current symptoms, remains afebrile, with chronic indwelling light catheter, urine cultures growing more than 100,000 colonies of Klebsiella pneumoniae, ESBL, resistant to Rocephin.  ? Patient will need infectious disease consult as per discussion with Dr. De La Rosa.  ? Urology input appreciated  ? I will follow up this patient closely.  ? Alkalosis developing, will need aggressive diuresis, increased ECF volume   ? Supplement kcl as needed.  ? Follow-up repeat labs today.  ? Continue   Bumex 2mg IV Q6hr  ? Added Diamox to 50 mg p.o. every 6 hours x3 doses.  ? Supplement potassium chloride 40 mEq x 2 doses.  ? Great urine output and negative balance 3.1 L.  ? Strict I/O  ? Daily weights  ? Avoid nephrotoxic medications  ? Patient with indwelling light catheter  ? Cardiology input noted  ? Discussed with patient and spouse at bedside.   ? We will follow closely  ? Cardiology plan noted.  ? Thanks for consultation.     Addendum    Infectious disease plan noted, no need of antibiotics at this time, follow clinically, if he develops any signs of sepsis then consider restarting ESBL coverage.        Cristóbal Mayen MD  [facility]  4/16/2021  10:52 EDT

## 2021-04-16 NOTE — PROGRESS NOTES
Clinical Pharmacy Services: Heart Failure Education    Dilip Verma was counseled over heart failure prior to discharge.     The patient was also counseled on all heart failure medications prior to discharge including name of medication, indication, and possible side effects.    Additional counseling points included but were not limited to:  1. Weigh yourself every morning after emptying your bladder and compare your weight to the day before  2. Keep the total amount of fluids you drink to only 6-8 glasses each day (48-64 ounces) or as otherwise directed by your physician  3. Take your medicine exactly as prescribed  4. Check for swelling in your feet, ankles, legs, and stomach  5. Eat foods that are low in salt or salt-free. Limit your sodium intake to <2000mg/day  6. Balance activity and rest periods  7. Do not smoke, and limit alcohol intake (No more than 2 drinks a day for men and 1 drink a day for women)    He was also instructed to contact his heart doctor immediately if he notices any of the following signs:  1. Weight gain of >2 pounds in 1 day or 5 pounds in 1 week  2. Vomiting and/or diarrhea that lasts more than 2 days  3. Feeling more shortness of breath than usual  4. Increased swelling in your feet, ankles, legs, or stomach  5. Development of a dry, hacking cough OR a productive cough with frothy sputum  6. Feeling more tired and having less energy to complete daily tasks  7. Feeling light-headed or dizzy  8. Having difficulty breathing when lying down flat     The patient was provided with educational materials for the above counseling points we reviewed to keep as a reference.     All other questions from the patient were answered at this time.   ?  Lawrence Roman, Pharmacy Intern

## 2021-04-16 NOTE — PLAN OF CARE
Goal Outcome Evaluation:  Plan of Care Reviewed With: patient  Progress: improving  Outcome Summary: VSS on 1 L NC. Refused to wear home bipap while sleeping. A&O x 4. Pt sat up on edge of bed for short time, seems to have increased strength as compared to last night. 11 beat run SVT, asymptomatic, EKG sinus rhythm.

## 2021-04-16 NOTE — CONSULTS
CONSULT NOTE    Infectious Diseases - Tino Bright MD  [facility]       Patient Identification:  Name: Dilip Verma  Age: 71 y.o.  Sex: male  :  1950  MRN: [id number]             Date of Consultation: 2020      Primary Care Physician: Dakota Avila MD                               Requesting Physician: Dr. Cyril Sanchez  Reason for Consultation: ESBL positive K. pneumoniae in the urine with abnormal urinalysis question: true infection versus colonization    Impression: Patient is a 71-year-old male who has complicated past medical history including history of fall with subsequent urinary retention when he was immobilized with nondisplaced intertrochanteric fracture of the left femur.  Patient has been having indwelling catheter since and has been managed by urology services monthly change of his catheter.  In that background patient is having significant rectal pain and pressure for some time and has had visit to the emergency room couple months ago with significant lower abdominal pain.  Patient has been treated in the past for urinary tract infection and lately was told by the urology service that he is going to grow some bacteria because of the catheter in place and appropriately told that not a week urine culture need to be treated if he does not have any symptoms.  In that background patient was hospitalized on 2021 for volume overload and has been diuresed.  He had issue with the urinary catheter that was replaced on 2021.  Subsequent urinalysis showed evidence of UTI in terms of leuks trace nitrate and white blood cells in the urine.  Urine culture came back positive for ESBL positive Klebsiella pneumonia >100k cfu/ml.  Patient was initially started on ceftriaxone but because of this pathogen infectious disease service is consulted.  Except for rectal pain and issues with urinary retention and recent change in his catheter patient does not have any other symptoms of  infection such as fever chills lower abdominal pain.  1-positive urine culture for Klebsiella pneumonia in the setting of no significant localizing urinary symptoms except for rectal pain which could be due to either primary anorectal pathology or could be reflection of severe acute prostatitis.   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    Patient is scheduled to have GI/general surgery evaluation and if it is noted by the consulting service that patient does not have explainable anorectal pathology to explain this pain then prostatitis need to be considered as alternative explanation especially in the context of positive urinalysis and urine culture.  Patient is at risk of prostatitis because of the frequent catheterizations and subsequent infections.  If that is the case then would recommend checking PSA level which if elevated need to be interpreted as if it is a reflection of acute prostatitis and not due to the bland manipulation of the prostate due to catheterization as patient has significantly abnormal urinalysis and urine culture is positive for the pathogen usually implicated in UTI and prostatitis.  If that occurs would recommend 4 to 6 weeks of IV ertapenem to address this.    Obviously if patient has alternate explanation for his and rectal pain then this positive urine culture can be followed as if it is colonization and use this information in the future if he develops sepsis to start empiric antibiotic therapy that should cover ESBL positive pathogens.  Would recommend DC ceftriaxone.  Discussed with Dr. Nam  Daniel.  Thank you Dr. Sanchez for letting me be the part of your patient care please see above impression and recommendations    History of Present Illness:   Patient is a 71-year-old male who has complicated past medical history including history of fall with subsequent urinary retention when he was immobilized with nondisplaced intertrochanteric fracture of the left femur.  Patient has been having indwelling catheter since and has been managed by urology services monthly change of his catheter.  In that background patient is having significant rectal pain and pressure for some time and has had visit to the emergency room couple months ago with significant lower abdominal pain.  Patient has been treated in the past for urinary tract infection and lately was told by the urology service that he is going to grow some bacteria because of the catheter in place and appropriately told that not a week urine culture need to be treated if he does not have any symptoms.  In that background patient was hospitalized on 4/12/2021 for volume overload and has been diuresed.  He had issue with the urinary catheter that was replaced on 4/13/2021.  Subsequent urinalysis showed evidence of UTI in terms of leuks trace nitrate and white blood cells in the urine.  Urine culture came back positive for ESBL positive Klebsiella pneumonia >100k cfu/ml.  Patient was initially started on ceftriaxone but because of this pathogen infectious disease service is consulted.  Except for rectal pain and issues with urinary retention and recent change in his catheter patient does not have any other symptoms of infection such as fever chills lower abdominal pain.      Past Medical History:  Past Medical History:   Diagnosis Date   • AMENA (acute kidney injury) (CMS/[facility]) 12/3/2020   • Anxiety    • Chronic diastolic (congestive) heart failure (CMS/[facility])    • Chronic kidney disease     stones- had lithotripsy   • CKD (chronic kidney disease) stage 3, GFR 30-59  "ml/min (CMS/[facility]) 12/1/2020   • Closed nondisplaced intertrochanteric fracture of left femur (CMS/[facility]) 12/1/2020   • Colon polyp    • Coronary artery disease     CABG 7/2019   • Diabetes mellitus, type II (CMS/[facility])    • Erectile dysfunction    • H/O bone density study never   • Hyperlipidemia    • Hypersomnia    • Hypertension    • Kidney stone    • Orthostasis    • Periodic breathing    • Routine eye exam 2015   • Sleep apnea     bipap   • Stroke (CMS/HCC)     \"slight stroke\"     Past Surgical History:  Past Surgical History:   Procedure Laterality Date   • CARDIAC CATHETERIZATION N/A 7/15/2019    Procedure: Coronary angiography;  Surgeon: Carrie Price MD;  Location:  [facility];  Service: Cardiovascular   • CARDIAC CATHETERIZATION N/A 7/15/2019    Procedure: Left Heart Cath;  Surgeon: Carrie Price MD;  Location:  [facility];  Service: Cardiovascular   • CARDIAC CATHETERIZATION N/A 7/15/2019    Procedure: Left ventriculography;  Surgeon: Carrie Price MD;  Location:  [facility];  Service: Cardiovascular   • CARDIAC CATHETERIZATION  7/15/2019    Procedure: Functional Flow [place];  Surgeon: Carrie Price MD;  Location:  [facility];  Service: Cardiovascular   • CARDIAC CATHETERIZATION N/A 11/6/2019    Procedure: Right and Left Heart Cath;  Surgeon: Mya Smith MD;  Location:  [facility];  Service: Cardiovascular   • CARDIAC CATHETERIZATION N/A 11/6/2019    Procedure: Coronary angiography;  Surgeon: Mya Smith MD;  Location:  [facility];  Service: Cardiovascular   • CATARACT EXTRACTION EXTRACAPSULAR W/ INTRAOCULAR LENS IMPLANTATION Bilateral    • COLONOSCOPY  01/2015    dr [email or web address]   • CORONARY ARTERY BYPASS GRAFT N/A 7/18/2019    Procedure: INTRAOPERATIVE SHOAIB; STERNOTOMY CORONARY ARTERY BYPASS x 3  USING LEFT INTERNAL MAMMARY ARTERY GRAFT UTILIZING ENDOSCOPICALLY HARVESTED RIGHT GREATER " SAPHENOUS VEIN AND PRP.;  Surgeon: Bill Devi MD;  Location: [facility] MAIN OR;  Service: Cardiothoracic   • DENTAL PROCEDURE      3 surgeries inder implants   • FEMUR IM NAILING/RODDING Left 12/3/2020    Procedure: LEFT HIP INTRAMEDULLARY NAIL;  Surgeon: Niraj Ravi MD;  Location: [facility] MAIN OR;  Service: Orthopedic Spine;  Laterality: Left;   • HERNIA REPAIR      inguinal bilaterally   • KIDNEY STONE SURGERY      lithotripsy      Home Meds:  Medications Prior to Admission   Medication Sig Dispense Refill Last Dose   • aspirin 81 MG EC tablet Take 81 mg by mouth Daily.   4/11/2021 at Unknown time   • carvedilol (COREG) 12.5 MG tablet Take one tablet twice daily with meals 180 tablet 2 4/12/2021 at Unknown time   • Cholecalciferol (Vitamin D) 125 MCG (5000 UT) capsule capsule Take 5,000 Units by mouth Daily.   4/11/2021 at Unknown time   • furosemide (LASIX) 40 MG tablet Take 1.5 tablets by mouth 2 (Two) Times a Day.   4/12/2021 at Unknown time   • hydrALAZINE (APRESOLINE) 25 MG tablet Take 1 tablet by mouth 2 (Two) Times a Day As Needed (for SBP > 160). 90 tablet 0 4/11/2021 at Unknown time   • Insulin Glargine, 2 Unit Dial, (TOUJEO) 300 UNIT/ML solution pen-injector injection Inject 28 Units under the skin into the appropriate area as directed Daily.   4/12/2021 at Unknown time   • insulin lispro (humaLOG) 100 UNIT/ML injection Inject 0-14 Units under the skin into the appropriate area as directed 3 (Three) Times a Day Before Meals.  12 4/11/2021 at Unknown time   • melatonin 3 MG tablet Take 9 mg by mouth Every Night.   Patient Taking Differently at Unknown time   • Oxymetazoline HCl (AFRIN NASAL SPRAY NA) into the nostril(s) as directed by provider.   4/11/2021 at Unknown time   • tamsulosin (FLOMAX) 0.4 MG capsule 24 hr capsule TAKE 2 CAPSULES BY MOUTH AT BEDTIME  (Patient taking differently: Take 1 capsule by mouth 2 (Two) Times a Day.) 60 capsule 0 Patient Taking Differently at Unknown time   •  sennosides-docusate (senna-docusate sodium) 8.6-50 MG per tablet Take 2 tablets by mouth 2 (two) times a day. 56 tablet 0 Unknown at Unknown time     Current Meds:     Current Facility-Administered Medications:   •  acetaminophen (TYLENOL) tablet 650 mg, 650 mg, Oral, Q4H PRN, Cyril Sanchez MD, 650 mg at 04/16/21 0604  •  aspirin EC tablet 81 mg, 81 mg, Oral, Daily, Cyril Sanchez MD, 81 mg at 04/16/21 0851  •  bisacodyl (DULCOLAX) suppository 10 mg, 10 mg, Rectal, Daily, Cyril Sanchez MD  •  bumetanide (BUMEX) injection 2 mg, 2 mg, Intravenous, Q6H, Cristóbal Mayen MD, 2 mg at 04/16/21 0704  •  carvedilol (COREG) tablet 12.5 mg, 12.5 mg, Oral, BID With Meals, Cyril Sanchez MD, 12.5 mg at 04/16/21 0851  •  cefTRIAXone (ROCEPHIN) IVPB 1 g, 1 g, Intravenous, Q24H, Cristóbal Mayen MD, Last Rate: 100 mL/hr at 04/15/21 1144, 1 g at 04/15/21 1144  •  dextrose (D50W) 25 g/ 50mL Intravenous Solution 25 g, 25 g, Intravenous, Q15 Min PRN, Lasha De La Rosa MD  •  dextrose (GLUTOSE) oral gel 15 g, 15 g, Oral, Q15 Min PRN, Lasha De La Rosa MD  •  enoxaparin (LOVENOX) syringe 40 mg, 40 mg, Subcutaneous, Q24H, Cyril Sanchez MD, 40 mg at 04/15/21 1538  •  glucagon (human recombinant) (GLUCAGEN DIAGNOSTIC) injection 1 mg, 1 mg, Subcutaneous, Q15 Min PRN, Lasha De La Rosa MD  •  insulin glargine (LANTUS, SEMGLEE) injection 15 Units, 15 Units, Subcutaneous, QAM, Lasha De La Rosa MD, 15 Units at 04/16/21 0604  •  insulin lispro (ADMELOG) injection 0-9 Units, 0-9 Units, Subcutaneous, TID With Meals, Lasha De La Rosa MD, 2 Units at 04/16/21 0853  •  insulin lispro (ADMELOG) injection 5 Units, 5 Units, Subcutaneous, TID With Meals, Lasha De La Rosa MD, 5 Units at 04/16/21 0851  •  melatonin tablet 3 mg, 3 mg, Oral, Nightly PRN, Alyssa Abbott APRN, 3 mg at 04/13/21 2210  •  nitroglycerin (NITROSTAT) SL tablet 0.4 mg, 0.4 mg, Sublingual, Q5 Min PRN, Cyril Sanchez MD  •  oxymetazoline (AFRIN) nasal spray 2 spray, 2 spray, Each Nare, BID,  , Alyssa HILLIARD, APRN, 2 spray at 21 0851  •  potassium chloride (K-DUR,KLOR-CON) ER tablet 40 mEq, 40 mEq, Oral, Q4H, Cristóbal Mayen MD  •  sennosides-docusate (PERICOLACE) 8.6-50 MG per tablet 2 tablet, 2 tablet, Oral, BID PRN, Cyril Sanchez MD, 2 tablet at 21 0858  •  sodium chloride 0.9 % flush 10 mL, 10 mL, Intravenous, Q12H, Cyril Sanchez MD, 10 mL at 21 0858  •  sodium chloride 0.9 % flush 10 mL, 10 mL, Intravenous, PRN, Cyril Sanchez MD  •  tamsulosin (FLOMAX) 24 hr capsule 0.4 mg, 0.4 mg, Oral, BID, Cyril Sanchez MD, 0.4 mg at 2151  Allergies:  Allergies   Allergen Reactions   • Ace Inhibitors Angioedema   • Clonidine Derivatives Unknown - High Severity     Passes out   • Losartan Angioedema     Angioneurotic edema   • Amlodipine Swelling   • Ativan [Lorazepam] Irritability   • Xanax [Alprazolam] Unknown - High Severity   • Minoxidil Confusion   • Tetracycline Other (See Comments)     bliisters in mouth       Social History:   Social History     Tobacco Use   • Smoking status: Former Smoker     Packs/day: 0.75     Years: 16.00     Pack years: 12.00     Quit date:      Years since quittin.3   • Smokeless tobacco: Never Used   • Tobacco comment: Start age:39/Stopping age:47, 3/4 PPD   Substance Use Topics   • Alcohol use: No     Comment: caffeine use - 1 cup daily      Family History:  Family History   Problem Relation Age of Onset   • Depression Mother    • Cancer Mother         uterine   • Arrhythmia Mother    • Heart disease Father    • Hypertension Father    • Kidney disease Father    • Thyroid disease Father    • Depression Sister    • Alcohol abuse Brother    • Alcohol abuse Other    • Alcohol abuse Other    • Lung disease Other    • Thyroid disease Other    • Glaucoma Other    • Heart attack Other    • Stroke Maternal Grandmother    • Stroke Paternal Grandmother           Review of Systems  See history of present illness and past medical history.   Remainder of  "ROS is negative.  Constitutional: Positive for fatigue.   Respiratory: Positive for shortness of breath.    Cardiovascular: Positive for leg swelling.  GI: Remarkable for rectal pain  Genitourinary: Positive for difficulty urinating.   Musculoskeletal: Positive for arthralgias and myalgias.   Psychiatric/Behavioral: The patient is nervous/anxious.    All other systems reviewed and are negative.    Vitals:   /66 (BP Location: Right arm, Patient Position: Lying)   Pulse 64   Temp 99 °F (37.2 °C) (Axillary)   Resp 18   Ht 175.3 cm (69\")   Wt 83.1 kg (183 lb 3.2 oz)   SpO2 91%   BMI 27.05 kg/m²   I/O:     Intake/Output Summary (Last 24 hours) at 4/16/2021 1228  Last data filed at 4/16/2021 1100  Gross per 24 hour   Intake 840 ml   Output 4475 ml   Net -3635 ml     Exam:  Patient is examined using the personal protective equipment as per guidelines from infection control for this particular patient as enacted.  Hand washing was performed before and after patient interaction.  General Appearance:    Alert, cooperative, no distress, appears stated age   Head:    Normocephalic, without obvious abnormality, atraumatic   Eyes:    PERRL, conjunctivae/corneas clear, EOM's intact, both eyes   Ears:    Normal external ear canals, both ears   Nose:   Nares normal, septum midline, mucosa normal, no drainage    or sinus tenderness   Throat:   Lips, tongue, gums normal; oral mucosa pink and moist   Neck:   Supple, symmetrical, trachea midline, no adenopathy;     thyroid:  no enlargement/tenderness/nodules; no carotid    bruit or JVD   Back:     Symmetric, no curvature, ROM normal, no CVA tenderness   Lungs:     Clear to auscultation bilaterally, respirations unlabored   Chest Wall:    No tenderness or deformity    Heart:    Regular rate and rhythm, S1 and S2 normal, no murmur, rub   or gallop   Abdomen:    Soft nontender no organomegaly detected  examination shows Hopkins catheter in place   Extremities:  Right leg " contracted lateral edema noted   Pulses:   Pulses palpable in all extremities; symmetric all extremities   Skin:  Bridge and on the right knee with no cellulitis noted hypertrophic scab noted   Neurologic:  Right-sided weakness and contracture noted       Data Review:    I reviewed the patient's new clinical results.  Results from last 7 days   Lab Units 04/14/21  0328 04/12/21  1452   WBC 10*3/mm3 6.12 6.44   HEMOGLOBIN g/dL 12.5* 12.9*   PLATELETS 10*3/mm3 192 199     Results from last 7 days   Lab Units 04/16/21  0511 04/15/21  0859 04/14/21  0328 04/13/21  0930 04/12/21  1521   SODIUM mmol/L 138 140 140 142 140   POTASSIUM mmol/L 3.4* 3.7 3.3* 3.6 3.7   CHLORIDE mmol/L 93* 96* 97* 101 101   CO2 mmol/L 36.2* 32.9* 31.7* 33.1* 27.7   BUN mg/dL 33* 31* 29* 29* 31*   CREATININE mg/dL 1.27 1.43* 1.31* 1.41* 1.51*   CALCIUM mg/dL 9.1 8.4* 8.5* 8.3* 8.4*   GLUCOSE mg/dL 207* 257* 181* 221* 177*     Microbiology Results (last 10 days)     Procedure Component Value - Date/Time    Urine Culture - Urine, Urine, Catheter [[id number]]  (Abnormal)  (Susceptibility) Collected: 04/14/21 0610    Lab Status: Final result Specimen: Urine, Catheter Updated: 04/16/21 0936     Urine Culture >100,000 CFU/mL Klebsiella pneumoniae ESBL     Comment: Consider infectious disease consult.  Susceptibility results may not correlate to clinical outcomes.       Susceptibility      Klebsiella pneumoniae ESBL      KAT      Amikacin Susceptible      Ertapenem Susceptible      Gentamicin Resistant      Levofloxacin Resistant      Meropenem Susceptible      Nitrofurantoin Resistant      Piperacillin + Tazobactam Resistant      Tetracycline Resistant      Tobramycin Intermediate      Trimethoprim + Sulfamethoxazole Resistant               Linear View                       No radiology results for the last 30 days.      Assessment:  Active Hospital Problems    Diagnosis  POA   • Bacteriuria [R82.71]  Unknown   • Acute on chronic diastolic (congestive)  heart failure (CMS/[facility]) [I50.33]  Yes   • Acute on chronic renal failure (CMS/[facility]) [N17.9, N18.9]  Unknown   • Urinary retention [R33.9]  Yes   • HTN (hypertension) [I10]  Yes   • CSA (central sleep apnea) [G47.31]  Yes   • Acute on chronic diastolic CHF (congestive heart failure) (CMS/[facility]) [I50.33]  Unknown   • CAD (coronary artery disease) [I25.10]  Yes   • Type 2 diabetes mellitus with hyperglycemia, with long-term current use of insulin (CMS/[facility]) [E11.65, Z79.4]  Not Applicable      Resolved Hospital Problems   No resolved problems to display.         Plan:  See above  Tino Nagel MD   4/16/2021  12:28 EDT    Much of this encounter note is an electronic transcription/translation of spoken language to printed text. The electronic translation of spoken language may permit erroneous, or at times, nonsensical words or phrases to be inadvertently transcribed; Although I have reviewed the note for such errors, some may still exist

## 2021-04-16 NOTE — PROGRESS NOTES
"   LOS: 2 days   Patient Care Team:  Dakota Avila MD as PCP - General (Family Medicine)  Morris Cai MD as Consulting Physician (Sleep Medicine)  Michaela Hylton MD as Consulting Physician (Cardiology)  Hardy Banerjee MD as Consulting Physician (Ophthalmology)  Chula Christianson MD as Consulting Physician (Ophthalmology)  Jason Sherman MD (Endocrinology)  Perla Mayen MD as Consulting Physician (Nephrology)  Federico Hebert MD as Consulting Physician (Pulmonary Disease)  Johan Dillard RN as Ambulatory  (Population Health)  Niraj Ravi MD as Consulting Physician (Orthopedic Surgery)  Papa Velasco MD as Consulting Physician (Urology)    Chief Complaint: volume overload     Interval History: Continues to have terrible rectal pressure/pain.  He had a small-medium sized BM yesterday.  Still not sleeping, requesting sleep agents. Has his home BiPAP but isn't using it as it causes \"facial flatulence.\" RT did not come by yesterday to see if our hospital machine may work better. Continues to have excellent diuresis.     Objective   Vital Signs  Temp:  [97.8 °F (36.6 °C)-99 °F (37.2 °C)] 99 °F (37.2 °C)  Heart Rate:  [63-67] 64  Resp:  [18] 18  BP: (123-139)/(64-70) 139/66    Intake/Output Summary (Last 24 hours) at 4/16/2021 1222  Last data filed at 4/16/2021 1100  Gross per 24 hour   Intake 840 ml   Output 4475 ml   Net -3635 ml       Last Weight and Admission Weight        04/16/21  0500   Weight: 83.1 kg (183 lb 3.2 oz)     Flowsheet Rows      First Filed Value   Admission Height  175.3 cm (69\") Documented at 04/12/2021 1335   Admission Weight  105 kg (231 lb 7.7 oz) Documented at 04/12/2021 1335        Physical Exam  Constitutional:       General: He is not in acute distress.  HENT:      Nose: Nose normal.      Mouth/Throat:      Pharynx: Oropharynx is clear.   Eyes:      Conjunctiva/sclera: Conjunctivae normal.   Cardiovascular:      Rate and Rhythm: " Normal rate.      Heart sounds: Murmur heard.   Systolic murmur is present with a grade of 1/6.        Comments: 2+ BLE to thighs; numerous skin tears/scabs, improved from yesterday  Pulmonary:      Effort: Pulmonary effort is normal.      Breath sounds: Examination of the right-lower field reveals decreased breath sounds. Decreased breath sounds present.   Abdominal:      Palpations: Abdomen is soft.      Tenderness: There is abdominal tenderness.   Musculoskeletal:      Right lower leg: Edema present.      Left lower leg: Edema present.   Skin:     General: Skin is warm.      Coloration: Skin is pale.   Neurological:      Mental Status: Mental status is at baseline.   Psychiatric:      Comments: Odd affect, anxious       Results Review:      Results from last 7 days   Lab Units 04/16/21  0511 04/15/21  0859 04/14/21  0328   SODIUM mmol/L 138 140 140   POTASSIUM mmol/L 3.4* 3.7 3.3*   CHLORIDE mmol/L 93* 96* 97*   CO2 mmol/L 36.2* 32.9* 31.7*   BUN mg/dL 33* 31* 29*   CREATININE mg/dL 1.27 1.43* 1.31*   GLUCOSE mg/dL 207* 257* 181*   CALCIUM mg/dL 9.1 8.4* 8.5*         Results from last 7 days   Lab Units 04/14/21  0328 04/12/21  1452   WBC 10*3/mm3 6.12 6.44   HEMOGLOBIN g/dL 12.5* 12.9*   HEMATOCRIT % 37.6 38.5   PLATELETS 10*3/mm3 192 199             Results from last 7 days   Lab Units 04/16/21  0511   MAGNESIUM mg/dL 1.9         I reviewed the patient's new clinical results.    I personally viewed and interpreted the patient's EKG/Telemetry data        Medication Review:   aspirin, 81 mg, Oral, Daily  bisacodyl, 10 mg, Rectal, Daily  bumetanide, 2 mg, Intravenous, Q6H  carvedilol, 12.5 mg, Oral, BID With Meals  cefTRIAXone, 1 g, Intravenous, Q24H  enoxaparin, 40 mg, Subcutaneous, Q24H  insulin glargine, 15 Units, Subcutaneous, QAM  insulin lispro, 0-9 Units, Subcutaneous, TID With Meals  insulin lispro, 5 Units, Subcutaneous, TID With Meals  oxymetazoline, 2 spray, Each Nare, BID  potassium chloride, 40 mEq,  Oral, Q4H  sodium chloride, 10 mL, Intravenous, Q12H  tamsulosin, 0.4 mg, Oral, BID      Assessment/Plan     1. Acute on chronic diastolic CHF  2. Chronic right sided CHF/severe PHTN, RVSP 70mm Hg  3. Longstanding severe essential hypertension  4. CAD s/p CABG  5. AMENA on CKD, stabilizing  6. Combined COCO/CSA  7. IDDM2  8. Urinary retention, history of UTI  9. History of orthopedic fractures/non-weight bearing status     *Continue IV bumetanide, follow labs and daily weights but doubt the accuracy of the bed scale (he can't stand to weigh); he is improving, albeit slowly. His weights are completely unreliable.     *UA is abnormal but he has a chronic indwelling catheter, and is afebrile and has a normal WBC; nephrology started ceftriaxone, but the culture is growing ESBL Klebsiella.  Dr Mayen has requested an ID consult.    *He continues to have severe rectal pain and subjective constipation despite BM yesterday; will check KUB and ask general surgery to see    *He is deconditioned and states he can't stand up because he can't weight bear on his left foot/ankle.  His wife says this is not the case.  His podiatrist does not come here. I will check plain films and ask ortho to weigh in.    *He reports body-wide pain; he does not take narcotics at home but is asking for them here.  I don't see any acute issue that would worsen his prior orthopedic pain, and I worry about respiratory depression with them.  He is not using his BiPAP either. I worry about sleep meds as they can also cause respiratory depression.  I again asked for RT to be called to see if they have any better fitting masks for BiPAP.     ADD:    D/W Dr Nagel. If no rectal pathology suspected/discovered, would have to consider prostatitis.    Cyril Sanchez MD  04/16/21  12:22 EDT

## 2021-04-17 LAB
ANION GAP SERPL CALCULATED.3IONS-SCNC: 9.5 MMOL/L (ref 5–15)
BUN SERPL-MCNC: 32 MG/DL (ref 8–23)
BUN/CREAT SERPL: 22.2 (ref 7–25)
CALCIUM SPEC-SCNC: 9.4 MG/DL (ref 8.6–10.5)
CHLORIDE SERPL-SCNC: 89 MMOL/L (ref 98–107)
CO2 SERPL-SCNC: 37.5 MMOL/L (ref 22–29)
CREAT SERPL-MCNC: 1.44 MG/DL (ref 0.76–1.27)
GFR SERPL CREATININE-BSD FRML MDRD: 48 ML/MIN/1.73
GLUCOSE BLDC GLUCOMTR-MCNC: 204 MG/DL (ref 70–130)
GLUCOSE BLDC GLUCOMTR-MCNC: 243 MG/DL (ref 70–130)
GLUCOSE BLDC GLUCOMTR-MCNC: 263 MG/DL (ref 70–130)
GLUCOSE BLDC GLUCOMTR-MCNC: 320 MG/DL (ref 70–130)
GLUCOSE SERPL-MCNC: 257 MG/DL (ref 65–99)
POTASSIUM SERPL-SCNC: 3.9 MMOL/L (ref 3.5–5.2)
SODIUM SERPL-SCNC: 136 MMOL/L (ref 136–145)

## 2021-04-17 PROCEDURE — 63710000001 INSULIN GLARGINE PER 5 UNITS: Performed by: HOSPITALIST

## 2021-04-17 PROCEDURE — 82962 GLUCOSE BLOOD TEST: CPT

## 2021-04-17 PROCEDURE — 99233 SBSQ HOSP IP/OBS HIGH 50: CPT | Performed by: INTERNAL MEDICINE

## 2021-04-17 PROCEDURE — 80048 BASIC METABOLIC PNL TOTAL CA: CPT | Performed by: INTERNAL MEDICINE

## 2021-04-17 PROCEDURE — 25010000002 ENOXAPARIN PER 10 MG: Performed by: INTERNAL MEDICINE

## 2021-04-17 PROCEDURE — 63710000001 INSULIN LISPRO (HUMAN) PER 5 UNITS: Performed by: HOSPITALIST

## 2021-04-17 PROCEDURE — 99231 SBSQ HOSP IP/OBS SF/LOW 25: CPT | Performed by: SURGERY

## 2021-04-17 RX ORDER — ACETAZOLAMIDE 250 MG/1
250 TABLET ORAL EVERY 6 HOURS
Status: COMPLETED | OUTPATIENT
Start: 2021-04-17 | End: 2021-04-17

## 2021-04-17 RX ORDER — BUMETANIDE 0.25 MG/ML
2 INJECTION INTRAMUSCULAR; INTRAVENOUS EVERY 6 HOURS
Status: COMPLETED | OUTPATIENT
Start: 2021-04-17 | End: 2021-04-17

## 2021-04-17 RX ORDER — POTASSIUM CHLORIDE 750 MG/1
40 TABLET, FILM COATED, EXTENDED RELEASE ORAL ONCE
Status: COMPLETED | OUTPATIENT
Start: 2021-04-17 | End: 2021-04-17

## 2021-04-17 RX ORDER — INSULIN GLARGINE 100 [IU]/ML
28 INJECTION, SOLUTION SUBCUTANEOUS EVERY MORNING
Status: DISCONTINUED | OUTPATIENT
Start: 2021-04-18 | End: 2021-04-18

## 2021-04-17 RX ORDER — INSULIN LISPRO 100 [IU]/ML
6 INJECTION, SOLUTION INTRAVENOUS; SUBCUTANEOUS
Status: DISCONTINUED | OUTPATIENT
Start: 2021-04-18 | End: 2021-04-18

## 2021-04-17 RX ORDER — POLYETHYLENE GLYCOL 3350 17 G/17G
17 POWDER, FOR SOLUTION ORAL DAILY
Status: DISCONTINUED | OUTPATIENT
Start: 2021-04-18 | End: 2021-04-23 | Stop reason: HOSPADM

## 2021-04-17 RX ORDER — INSULIN LISPRO 100 [IU]/ML
10 INJECTION, SOLUTION INTRAVENOUS; SUBCUTANEOUS
Status: DISCONTINUED | OUTPATIENT
Start: 2021-04-17 | End: 2021-04-18

## 2021-04-17 RX ORDER — BUMETANIDE 2 MG/1
2 TABLET ORAL DAILY
Status: DISCONTINUED | OUTPATIENT
Start: 2021-04-18 | End: 2021-04-18

## 2021-04-17 RX ADMIN — BUMETANIDE 2 MG: 0.25 INJECTION INTRAMUSCULAR; INTRAVENOUS at 17:00

## 2021-04-17 RX ADMIN — INSULIN LISPRO 5 UNITS: 100 INJECTION, SOLUTION INTRAVENOUS; SUBCUTANEOUS at 11:40

## 2021-04-17 RX ADMIN — ACETAZOLAMIDE 250 MG: 250 TABLET ORAL at 22:08

## 2021-04-17 RX ADMIN — POTASSIUM CHLORIDE 40 MEQ: 750 TABLET, EXTENDED RELEASE ORAL at 10:25

## 2021-04-17 RX ADMIN — INSULIN LISPRO 6 UNITS: 100 INJECTION, SOLUTION INTRAVENOUS; SUBCUTANEOUS at 17:02

## 2021-04-17 RX ADMIN — ACETAZOLAMIDE 250 MG: 250 TABLET ORAL at 11:37

## 2021-04-17 RX ADMIN — BUMETANIDE 2 MG: 0.25 INJECTION INTRAMUSCULAR; INTRAVENOUS at 22:08

## 2021-04-17 RX ADMIN — ACETAMINOPHEN 650 MG: 325 TABLET, FILM COATED ORAL at 03:21

## 2021-04-17 RX ADMIN — ENOXAPARIN SODIUM 40 MG: 40 INJECTION SUBCUTANEOUS at 15:22

## 2021-04-17 RX ADMIN — INSULIN LISPRO 10 UNITS: 100 INJECTION, SOLUTION INTRAVENOUS; SUBCUTANEOUS at 17:04

## 2021-04-17 RX ADMIN — TAMSULOSIN HYDROCHLORIDE 0.4 MG: 0.4 CAPSULE ORAL at 10:13

## 2021-04-17 RX ADMIN — CARVEDILOL 12.5 MG: 12.5 TABLET, FILM COATED ORAL at 17:01

## 2021-04-17 RX ADMIN — BUMETANIDE 2 MG: 0.25 INJECTION INTRAMUSCULAR; INTRAVENOUS at 10:16

## 2021-04-17 RX ADMIN — BUMETANIDE 2 MG: 0.25 INJECTION INTRAMUSCULAR; INTRAVENOUS at 05:52

## 2021-04-17 RX ADMIN — BISACODYL 10 MG: 10 SUPPOSITORY RECTAL at 10:16

## 2021-04-17 RX ADMIN — CARVEDILOL 12.5 MG: 12.5 TABLET, FILM COATED ORAL at 10:13

## 2021-04-17 RX ADMIN — INSULIN LISPRO 7 UNITS: 100 INJECTION, SOLUTION INTRAVENOUS; SUBCUTANEOUS at 11:39

## 2021-04-17 RX ADMIN — TAMSULOSIN HYDROCHLORIDE 0.4 MG: 0.4 CAPSULE ORAL at 22:08

## 2021-04-17 RX ADMIN — INSULIN LISPRO 5 UNITS: 100 INJECTION, SOLUTION INTRAVENOUS; SUBCUTANEOUS at 09:00

## 2021-04-17 RX ADMIN — SODIUM CHLORIDE, PRESERVATIVE FREE 10 ML: 5 INJECTION INTRAVENOUS at 22:09

## 2021-04-17 RX ADMIN — INSULIN GLARGINE 20 UNITS: 100 INJECTION, SOLUTION SUBCUTANEOUS at 06:41

## 2021-04-17 RX ADMIN — ACETAZOLAMIDE 250 MG: 250 TABLET ORAL at 16:58

## 2021-04-17 RX ADMIN — SODIUM CHLORIDE, PRESERVATIVE FREE 10 ML: 5 INJECTION INTRAVENOUS at 10:18

## 2021-04-17 RX ADMIN — INSULIN LISPRO 6 UNITS: 100 INJECTION, SOLUTION INTRAVENOUS; SUBCUTANEOUS at 08:00

## 2021-04-17 RX ADMIN — ACETAMINOPHEN 650 MG: 325 TABLET, FILM COATED ORAL at 16:59

## 2021-04-17 RX ADMIN — ASPIRIN 81 MG: 81 TABLET, COATED ORAL at 10:13

## 2021-04-17 NOTE — PLAN OF CARE
Goal Outcome Evaluation:  Plan of Care Reviewed With: patient  Progress: improving  Outcome Summary: VSS on RA, A&O x 4. Tylenol given for pain as charted. Pt had large BM tonight after receiving suppository on day shift. Sitting on edge of bed and demonstrating increased strength. Sat in chair for ~2 hours. Will CTM.

## 2021-04-17 NOTE — PROGRESS NOTES
Name: Dilip Verma ADMIT: 2021   : 1950  PCP: Dakota Avila MD    MRN: [id number] LOS: 3 days   AGE/SEX: 71 y.o. male  ROOM: [facility]     Subjective   Subjective   No new c/o     Objective   Objective   Vital Signs  Temp:  [97.6 °F (36.4 °C)-98.5 °F (36.9 °C)] 97.6 °F (36.4 °C)  Heart Rate:  [63-64] 64  Resp:  [16-18] 16  BP: (118-155)/(65-75) 137/73  SpO2:  [94 %-97 %] 97 %  on  Flow (L/min):  [2] 2;   Device (Oxygen Therapy): room air  Body mass index is 27.02 kg/m².     Intake/Output Summary (Last 24 hours) at 2021 1224  Last data filed at 2021 1100  Gross per 24 hour   Intake 1320 ml   Output 4950 ml   Net -3630 ml     Wt Readings from Last 1 Encounters:   21 0600 83 kg (182 lb 15.7 oz)   21 0508 83 kg (182 lb 15.7 oz)   21 0500 83.1 kg (183 lb 3.2 oz)   04/15/21 0600 95.6 kg (210 lb 12.2 oz)   04/15/21 0500 95.6 kg (210 lb 12.2 oz)   21 0700 101 kg (222 lb 0.1 oz)   21 0500 106 kg (234 lb 2.1 oz)   21 1335 105 kg (231 lb 7.7 oz)     Wt Readings from Last 3 Encounters:   21 83 kg (182 lb 15.7 oz)   21 96.6 kg (213 lb)   21 96.6 kg (213 lb)       Physical Exam  Constitutional:       General: He is not in acute distress.     Appearance: He is not diaphoretic.   Cardiovascular:      Rate and Rhythm: Normal rate and regular rhythm.      Heart sounds: No murmur heard.     Pulmonary:      Effort: Pulmonary effort is normal.      Breath sounds: Normal breath sounds.   Abdominal:      General: Bowel sounds are normal. There is no distension.      Palpations: Abdomen is soft.      Tenderness: There is no abdominal tenderness.   Genitourinary:     Comments: + light  Musculoskeletal:      Right lower leg: Edema present.      Left lower leg: Edema present.   Skin:     General: Skin is warm and dry.   Neurological:      Mental Status: He is alert and oriented to person, place, and time.       Results Review:       I reviewed the patient's new  clinical results.  Results from last 7 days   Lab Units 04/14/21  0328 04/12/21  1452   WBC 10*3/mm3 6.12 6.44   HEMOGLOBIN g/dL 12.5* 12.9*   PLATELETS 10*3/mm3 192 199     Results from last 7 days   Lab Units 04/17/21  0722 04/16/21  0511 04/15/21  0859 04/14/21  0328   SODIUM mmol/L 136 138 140 140   POTASSIUM mmol/L 3.9 3.4* 3.7 3.3*   CHLORIDE mmol/L 89* 93* 96* 97*   CO2 mmol/L 37.5* 36.2* 32.9* 31.7*   BUN mg/dL 32* 33* 31* 29*   CREATININE mg/dL 1.44* 1.27 1.43* 1.31*   GLUCOSE mg/dL 257* 207* 257* 181*   Estimated Creatinine Clearance: 54.4 mL/min (A) (by C-G formula based on SCr of 1.44 mg/dL (H)).  Results from last 7 days   Lab Units 04/14/21  0328 04/13/21  0930 04/12/21  1521   ALBUMIN g/dL 3.30* 3.30* 3.30*   BILIRUBIN mg/dL  --   --  2.5*   ALK PHOS U/L  --   --  325*   AST (SGOT) U/L  --   --  29   ALT (SGPT) U/L  --   --  23     Results from last 7 days   Lab Units 04/17/21  0722 04/16/21  0511 04/15/21  0859 04/14/21 0328 04/13/21  0930 04/12/21  1521   CALCIUM mg/dL 9.4 9.1 8.4* 8.5* 8.3* 8.4*   ALBUMIN g/dL  --   --   --  3.30* 3.30* 3.30*   MAGNESIUM mg/dL  --  1.9  --   --   --   --    PHOSPHORUS mg/dL  --   --   --  3.7 3.2  --        Glucose   Date/Time Value Ref Range Status   04/17/2021 1113 320 (H) 70 - 130 mg/dL Final   04/17/2021 0621 204 (H) 70 - 130 mg/dL Final   04/16/2021 2051 208 (H) 70 - 130 mg/dL Final   04/16/2021 1646 170 (H) 70 - 130 mg/dL Final   04/16/2021 1104 220 (H) 70 - 130 mg/dL Final   04/16/2021 0553 196 (H) 70 - 130 mg/dL Final   04/15/2021 2109 184 (H) 70 - 130 mg/dL Final       acetaZOLAMIDE, 250 mg, Oral, Q6H  aspirin, 81 mg, Oral, Daily  bisacodyl, 10 mg, Rectal, Daily  bumetanide, 2 mg, Intravenous, Q6H  carvedilol, 12.5 mg, Oral, BID With Meals  enoxaparin, 40 mg, Subcutaneous, Q24H  insulin glargine, 20 Units, Subcutaneous, QAM  insulin lispro, 0-9 Units, Subcutaneous, TID With Meals  insulin lispro, 5 Units, Subcutaneous, TID With Meals  [START ON 4/18/2021]  "polyethylene glycol, 17 g, Oral, Daily  sodium chloride, 10 mL, Intravenous, Q12H  tamsulosin, 0.4 mg, Oral, BID       Diet Regular; Consistent Carbohydrate, Low Sodium; No Added Salt       Assessment/Plan     Active Hospital Problems    Diagnosis  POA   • Bacteriuria [R82.71]  Unknown   • Rectal pain [K62.89]  Unknown   • Acute on chronic diastolic (congestive) heart failure (CMS/[facility]) [I50.33]  Yes   • Acute on chronic renal failure (CMS/[facility]) [N17.9, N18.9]  Unknown   • Urinary retention [R33.9]  Yes   • HTN (hypertension) [I10]  Yes   • CSA (central sleep apnea) [G47.31]  Yes   • Acute on chronic diastolic CHF (congestive heart failure) (CMS/[facility]) [I50.33]  Unknown   • CAD (coronary artery disease) [I25.10]  Yes   • Type 2 diabetes mellitus with hyperglycemia, with long-term current use of insulin (CMS/[facility]) [E11.65, Z79.4]  Not Applicable      Resolved Hospital Problems   No resolved problems to display.       Type 2 diabetes mellitus with hyperglycemia, with long-term current use of insulin     Rectal pain resolved with bowel movement this morning.    Eating a little bit better.  Having some things \"off menu\" likely contributing to hyperglycemia.  Will increase insulin to his home regimen.  He takes 28 units of basal insulin and 6 units of short acting with lunch, 10 units with dinner.  Moderate dose SSI.    A1c 7.6%, no plans to change regimen at discharge.  He follows with an endocrinologist.    Continue daily monitoring and make adjustments as needed.       Lasha De La Rosa MD  [facility]  04/17/21  12:24 EDT    "

## 2021-04-17 NOTE — PROGRESS NOTES
"  Infectious Diseases Progress Note    Tino Nagel MD     [facility]  [place]: 3 days  Patient Identification:  Name: Dilip Verma  Age: 71 y.o.  Sex: male  :  1950  MRN: [id number]         Primary Care Physician: Dakota Avila MD            Subjective: Resting comfortably.  His rectal pain has resolved after the bowel movement.  Denies any lower abdominal pain or discomfort or pain in his bladder or urethra or discomfort with catheter.  Sitting in the bed with his wife is bedside.  Interval History: See consultation note.    Objective:    Scheduled Meds:acetaZOLAMIDE, 250 mg, Oral, Q6H  aspirin, 81 mg, Oral, Daily  bisacodyl, 10 mg, Rectal, Daily  bumetanide, 2 mg, Intravenous, Q6H  [START ON 2021] bumetanide, 2 mg, Oral, Daily  carvedilol, 12.5 mg, Oral, BID With Meals  enoxaparin, 40 mg, Subcutaneous, Q24H  [START ON 2021] insulin glargine, 28 Units, Subcutaneous, QAM  insulin lispro, 0-9 Units, Subcutaneous, TID With Meals  insulin lispro, 10 Units, Subcutaneous, Daily With Dinner  [START ON 2021] insulin lispro, 6 Units, Subcutaneous, Daily With Lunch  [START ON 2021] polyethylene glycol, 17 g, Oral, Daily  sodium chloride, 10 mL, Intravenous, Q12H  tamsulosin, 0.4 mg, Oral, BID      Continuous Infusions:     Vital signs in last 24 hours:  Temp:  [97.6 °F (36.4 °C)-98.5 °F (36.9 °C)] 97.6 °F (36.4 °C)  Heart Rate:  [63-64] 64  Resp:  [16-18] 16  BP: (118-141)/(65-73) 137/73    Intake/Output:    Intake/Output Summary (Last 24 hours) at 2021 1857  Last data filed at 2021 1700  Gross per 24 hour   Intake 960 ml   Output 5200 ml   Net -4240 ml       Exam:  /73 (BP Location: Left arm, Patient Position: Sitting)   Pulse 64   Temp 97.6 °F (36.4 °C) (Oral)   Resp 16   Ht 175.3 cm (69\")   Wt 83 kg (182 lb 15.7 oz)   SpO2 97%   BMI 27.02 kg/m²   Patient is examined using the personal protective equipment as per guidelines from infection control for this " particular patient as enacted.  Hand washing was performed before and after patient interaction.  General Appearance:    Alert, cooperative, no distress, AAOx3                          Head:    Normocephalic, without obvious abnormality, atraumatic                           Eyes:    PERRL, conjunctivae/corneas clear, EOM's intact, both eyes                         Throat:   Lips, tongue, gums normal; oral mucosa pink and moist                           Neck:   Supple, symmetrical, trachea midline, no JVD                         Lungs:    Clear to auscultation bilaterally, respirations unlabored                 Chest Wall:    No tenderness or deformity                          Heart:    Regular rate and rhythm, S1 and S2 normal, no murmur                  Abdomen:     Soft, non-tender, bowel sounds active, no masses, Hopkins catheter in place                 Extremities:   Extremities normal, atraumatic, no cyanosis or edema                        Pulses:   Pulses palpable in all extremities                            Skin:   Skin is warm and dry,  no rashes or palpable lesions                  Neurologic: Grossly nonfocal examination.    Data Review:    I reviewed the patient's new clinical results.  Results from last 7 days   Lab Units 04/14/21  0328 04/12/21  1452   WBC 10*3/mm3 6.12 6.44   HEMOGLOBIN g/dL 12.5* 12.9*   PLATELETS 10*3/mm3 192 199     Results from last 7 days   Lab Units 04/17/21  0722 04/16/21  0511 04/15/21  0859 04/14/21  0328 04/13/21  0930 04/12/21  1521   SODIUM mmol/L 136 138 140 140 142 140   POTASSIUM mmol/L 3.9 3.4* 3.7 3.3* 3.6 3.7   CHLORIDE mmol/L 89* 93* 96* 97* 101 101   CO2 mmol/L 37.5* 36.2* 32.9* 31.7* 33.1* 27.7   BUN mg/dL 32* 33* 31* 29* 29* 31*   CREATININE mg/dL 1.44* 1.27 1.43* 1.31* 1.41* 1.51*   CALCIUM mg/dL 9.4 9.1 8.4* 8.5* 8.3* 8.4*   GLUCOSE mg/dL 257* 207* 257* 181* 221* 177*     Microbiology Results (last 10 days)     Procedure Component Value - Date/Time    COVID  PRE-OP / PRE-PROCEDURE SCREENING ORDER (NO ISOLATION) - Swab, Nasopharynx [[id number]]  (Normal) Collected: 04/16/21 2030    Lab Status: Final result Specimen: Swab from Nasopharynx Updated: 04/16/21 2122    Narrative:      The following orders were created for panel order COVID PRE-OP / PRE-PROCEDURE SCREENING ORDER (NO ISOLATION) - Swab, Nasopharynx.  Procedure                               Abnormality         Status                     ---------                               -----------         ------                     COVID-19,[facility]HOUSE...[[id number]]  Normal              Final result                 Please view results for these tests on the individual orders.    COVID-19,[facility] CEPHEID, NP SWAB IN TRANSPORT MEDIA 8-12 HR TAT - Swab, Nasopharynx [[id number]]  (Normal) Collected: 04/16/21 2030    Lab Status: Final result Specimen: Swab from Nasopharynx Updated: 04/16/21 2122     COVID19 Not Detected    Narrative:      Fact sheet for providers: https://www.fda.gov/media/320839/download     Fact sheet for patients: https://www.fda.gov/media/145361/download    Urine Culture - Urine, Urine, Catheter [[id number]]  (Abnormal)  (Susceptibility) Collected: 04/14/21 0610    Lab Status: Final result Specimen: Urine, Catheter Updated: 04/16/21 0936     Urine Culture >100,000 CFU/mL Klebsiella pneumoniae ESBL     Comment: Consider infectious disease consult.  Susceptibility results may not correlate to clinical outcomes.       Susceptibility      Klebsiella pneumoniae ESBL      KAT      Amikacin Susceptible      Ertapenem Susceptible      Gentamicin Resistant      Levofloxacin Resistant      Meropenem Susceptible      Nitrofurantoin Resistant      Piperacillin + Tazobactam Resistant      Tetracycline Resistant      Tobramycin Intermediate      Trimethoprim + Sulfamethoxazole Resistant               Linear View                           Assessment:    Type 2 diabetes mellitus with hyperglycemia, with long-term  current use of insulin (CMS/[facility])    CAD (coronary artery disease)    Acute on chronic diastolic CHF (congestive heart failure) (CMS/[facility])    CSA (central sleep apnea)    HTN (hypertension)    Urinary retention    Acute on chronic renal failure (CMS/HCC)    Acute on chronic diastolic (congestive) heart failure (CMS/[facility])    Bacteriuria    Rectal pain  Recommendations/discussion:   · Continue observing him off of antibiotic therapy with periodic assessment for any evolving signs and symptoms of sepsis.  If that occurs then after initiating repeat septic work-up empiric treatment for urinary tract infection with ertapenem is not unreasonable.   · Patient's wife has multiple questions which were answered including the concept of treating infections which would require not only presence of bacteria in urine or other samples but also concomitant symptoms which could be followed for the effectiveness of treatment with antibiotics in patient is asymptomatic and does not have any altered physiology treating organisms alone may cause more harm than good.    Tino Nagel MD  4/17/2021  18:57 EDT    Much of this encounter note is an electronic transcription/translation of spoken language to printed text. The electronic translation of spoken language may permit erroneous, or at times, nonsensical words or phrases to be inadvertently transcribed; Although I have reviewed the note for such errors, some may still exist

## 2021-04-17 NOTE — CONSULTS
"  Orthopaedic Surgery  Consult Note  Dr. VANNA Hubbard” Teagan II  [phone]    HPI:  Patient is a 71 y.o. Not  or  male who presents with history of a left hip and left foot fracture.  His foot fracture was treated conservatively and did not need any sort of surgery.  However, his hip fracture was a complicated subtrochanteric fracture requiring a long nail with a cable for supplemental fixation.  Patient really has had limited mobility since his surgery back in December.  He has not been able to mobilize very well and still has significant complaints of hip pain.  He has been readmitted to the hospital for multiple medical problems.  There were concerns about his left hip and foot and therefore I was consulted.    MEDICAL HISTORY  Past Medical History:   Diagnosis Date   • AMENA (acute kidney injury) (CMS/[facility]) 12/3/2020   • Anxiety    • Chronic diastolic (congestive) heart failure (CMS/[facility])    • Chronic kidney disease     stones- had lithotripsy   • CKD (chronic kidney disease) stage 3, GFR 30-59 ml/min (CMS/[facility]) 12/1/2020   • Closed nondisplaced intertrochanteric fracture of left femur (CMS/[facility]) 12/1/2020   • Colon polyp    • Coronary artery disease     CABG 7/2019   • Diabetes mellitus, type II (CMS/[facility])    • Erectile dysfunction    • H/O bone density study never   • Hyperlipidemia    • Hypersomnia    • Hypertension    • Kidney stone    • Orthostasis    • Periodic breathing    • Routine eye exam 2015   • Sleep apnea     bipap   • Stroke (CMS/[facility])     \"slight stroke\"   ·   Past Surgical History:   Procedure Laterality Date   • CARDIAC CATHETERIZATION N/A 7/15/2019    Procedure: Coronary angiography;  Surgeon: Carrie Price MD;  Location:  [facility];  Service: Cardiovascular   • CARDIAC CATHETERIZATION N/A 7/15/2019    Procedure: Left Heart Cath;  Surgeon: Carrie Price MD;  Location:  [facility];  Service: Cardiovascular   • CARDIAC CATHETERIZATION N/A 7/15/2019 "    Procedure: Left ventriculography;  Surgeon: Carrie Price MD;  Location: [facility] CATH INVASIVE LOCATION;  Service: Cardiovascular   • CARDIAC CATHETERIZATION  7/15/2019    Procedure: Functional Flow [place];  Surgeon: Carrie Price MD;  Location:  [facility];  Service: Cardiovascular   • CARDIAC CATHETERIZATION N/A 11/6/2019    Procedure: Right and Left Heart Cath;  Surgeon: Mya Smith MD;  Location: [facility] CATH INVASIVE LOCATION;  Service: Cardiovascular   • CARDIAC CATHETERIZATION N/A 11/6/2019    Procedure: Coronary angiography;  Surgeon: Mya Smith MD;  Location: [facility] CATH INVASIVE LOCATION;  Service: Cardiovascular   • CATARACT EXTRACTION EXTRACAPSULAR W/ INTRAOCULAR LENS IMPLANTATION Bilateral    • COLONOSCOPY  01/2015    dr jimenez   • CORONARY ARTERY BYPASS GRAFT N/A 7/18/2019    Procedure: INTRAOPERATIVE SHOAIB; STERNOTOMY CORONARY ARTERY BYPASS x 3  USING LEFT INTERNAL MAMMARY ARTERY GRAFT UTILIZING ENDOSCOPICALLY HARVESTED RIGHT GREATER SAPHENOUS VEIN AND PRP.;  Surgeon: Bill Devi MD;  Location: [facility] MAIN OR;  Service: Cardiothoracic   • DENTAL PROCEDURE      3 surgeries inder implants   • FEMUR IM NAILING/RODDING Left 12/3/2020    Procedure: LEFT HIP INTRAMEDULLARY NAIL;  Surgeon: Niraj Ravi MD;  Location: [facility] MAIN OR;  Service: Orthopedic Spine;  Laterality: Left;   • HERNIA REPAIR      inguinal bilaterally   • KIDNEY STONE SURGERY      lithotripsy   ·   Prior to Admission medications    Medication Sig Start Date End Date Taking? Authorizing Provider   aspirin 81 MG EC tablet Take 81 mg by mouth Daily.   Yes ProviderHeri MD   carvedilol (COREG) 12.5 MG tablet Take one tablet twice daily with meals 3/12/21  Yes Danii Dennison APRN   Cholecalciferol (Vitamin D) 125 MCG (5000 UT) capsule capsule Take 5,000 Units by mouth Daily.   Yes Heri Rinaldi MD   furosemide (LASIX) 40 MG tablet Take 1.5 tablets by mouth 2 (Two) Times a Day. 3/24/21  Yes  Danii Dennison APRN   hydrALAZINE (APRESOLINE) 25 MG tablet Take 1 tablet by mouth 2 (Two) Times a Day As Needed (for SBP > 160). 21  Yes Danii Dennison APRN   Insulin Glargine, 2 Unit Dial, (TOUJEO) 300 UNIT/ML solution pen-injector injection Inject 28 Units under the skin into the appropriate area as directed Daily.   Yes ProviderHeri MD   insulin lispro (humaLOG) 100 UNIT/ML injection Inject 0-14 Units under the skin into the appropriate area as directed 3 (Three) Times a Day Before Meals. 20  Yes Amador Valverde MD   melatonin 3 MG tablet Take 9 mg by mouth Every Night.   Yes ProviderHeri MD   Oxymetazoline HCl (AFRIN NASAL SPRAY NA) into the nostril(s) as directed by provider.   Yes ProviderHeri MD   tamsulosin (FLOMAX) 0.4 MG capsule 24 hr capsule TAKE 2 CAPSULES BY MOUTH AT BEDTIME   Patient taking differently: Take 1 capsule by mouth 2 (Two) Times a Day. 3/29/21  Yes Leena Bernal APRN   sennosides-docusate (senna-docusate sodium) 8.6-50 MG per tablet Take 2 tablets by mouth 2 (two) times a day. 1/3/21   Candido Arroyo MD   ·   Allergies   Allergen Reactions   • Ace Inhibitors Angioedema   • Clonidine Derivatives Unknown - High Severity     Passes out   • Losartan Angioedema     Angioneurotic edema   • Amlodipine Swelling   • Ativan [Lorazepam] Irritability   • Xanax [Alprazolam] Unknown - High Severity   • Minoxidil Confusion   • Tetracycline Other (See Comments)     bliisters in mouth     ·   ·   · There is no immunization history on file for this patient.  Social History     Tobacco Use   • Smoking status: Former Smoker     Packs/day: 0.75     Years: 16.00     Pack years: 12.00     Quit date:      Years since quittin.3   • Smokeless tobacco: Never Used   • Tobacco comment: Start age:39/Stopping age:47, 3/4 PPD   Substance Use Topics   • Alcohol use: No     Comment: caffeine use - 1 cup daily   ·    Social History     Substance and Sexual Activity   Drug  "Use No   ·     VITALS: /73 (BP Location: Left arm, Patient Position: Sitting)   Pulse 64   Temp 97.6 °F (36.4 °C) (Oral)   Resp 16   Ht 175.3 cm (69\")   Wt 83 kg (182 lb 15.7 oz)   SpO2 97%   BMI 27.02 kg/m²  Body mass index is 27.02 kg/m².    PHYSICAL EXAM:   · CONSTITUTIONAL: No acute distress  · LUNGS: Equal chest rise, no shortness of air  · CARDIOVASCULAR: palpable peripheral pulses  · SKIN: no skin lesions in the area examined  · LYMPH: no lymphadenopathy in the area examined  · EXTREMITY: Left Lower Extremity  · Tenderness to Palpation: Tenderness with range of motion of the left hip.  He is also somewhat tender to palpation of the foot  · Gross Deformity: The foot is grossly swollen with no erythema  · Pulses:  Brisk Capillary Refill  · Sensation: Intact to the foot and leg grossly  · Motor: He is able to move his ankle    RADIOLOGY REVIEW:   XR Ankle 3+ View Left    Result Date: 4/16/2021  1. Generalized soft tissue swelling about the left distal calf, ankle, foot. 2. Chronic appearing avulsion fracture of the dorsal talar head. 3. No evidence for acute fracture or acute osseous abnormality 4. Microvascular calcifications.  This report was finalized on 4/16/2021 2:07 PM by Dr. Zechariah Falcon M.D.      XR Foot 3+ View Left    Result Date: 4/16/2021  1. Generalized soft tissue swelling about the left distal calf, ankle, foot. 2. Chronic appearing avulsion fracture of the dorsal talar head. 3. No evidence for acute fracture or acute osseous abnormality 4. Microvascular calcifications.  This report was finalized on 4/16/2021 2:07 PM by Dr. Zechariah Falcon M.D.      XR Abdomen KUB    Result Date: 4/16/2021  1. Mild-to-moderate stool throughout the colon. Nonobstructive bowel gas pattern. Mild gaseous distention of stomach. 2. Previous gamma nail and cerclage wire fixation of the left proximal femoral fracture that does not appear united and exhibits varus angulation.  This report was finalized on " 4/16/2021 1:44 PM by Dr. Zechariah Falcon M.D.        LABS:   Results for the past 24 hours:   Recent Results (from the past 24 hour(s))   POC Glucose Once    Collection Time: 04/16/21  4:46 PM    Specimen: Blood   Result Value Ref Range    Glucose 170 (H) 70 - 130 mg/dL   COVID-19,[facility]-HOUSE CEPHEID, NP SWAB IN TRANSPORT MEDIA 8-12 HR TAT - Swab, Nasopharynx    Collection Time: 04/16/21  8:30 PM    Specimen: Nasopharynx; Swab   Result Value Ref Range    COVID19 Not Detected Not Detected - Ref. Range   POC Glucose Once    Collection Time: 04/16/21  8:51 PM    Specimen: Blood   Result Value Ref Range    Glucose 208 (H) 70 - 130 mg/dL   POC Glucose Once    Collection Time: 04/17/21  6:21 AM    Specimen: Blood   Result Value Ref Range    Glucose 204 (H) 70 - 130 mg/dL   Basic Metabolic Panel    Collection Time: 04/17/21  7:22 AM    Specimen: Blood   Result Value Ref Range    Glucose 257 (H) 65 - 99 mg/dL    BUN 32 (H) 8 - 23 mg/dL    Creatinine 1.44 (H) 0.76 - 1.27 mg/dL    Sodium 136 136 - 145 mmol/L    Potassium 3.9 3.5 - 5.2 mmol/L    Chloride 89 (L) 98 - 107 mmol/L    CO2 37.5 (H) 22.0 - 29.0 mmol/L    Calcium 9.4 8.6 - 10.5 mg/dL    eGFR Non African Amer 48 (L) >60 mL/min/1.73    BUN/Creatinine Ratio 22.2 7.0 - 25.0    Anion Gap 9.5 5.0 - 15.0 mmol/L   POC Glucose Once    Collection Time: 04/17/21 11:13 AM    Specimen: Blood   Result Value Ref Range    Glucose 320 (H) 70 - 130 mg/dL       IMPRESSION:  Patient is a 71 y.o. Not  or  male with left ankle pain and swelling and a nonhealed left subtrochanteric hip fracture with retained long cephalomedullary nail    PLAN:   · Admited to: Amador Reyes Jr., MD  · Disposition: Recommend conservative treatment for the foot for now.  Outpatient follow-up with foot and ankle specialist.  For the left hip at this point he shows no evidence of bony healing.  I suspect he will need further surgery on this hip and would be happy to provide my services but I  "would like him to get out of the hospital and medically optimized prior to doing any sort of surgery.  Certainly nothing needs to be done emergently we could always discuss further surgery on the hip at a later time.  Additionally, if he is able to mobilize with minimal pain we could consider just leaving it for now and see how he does.  I am happy have this patient follow-up with me in a few weeks.    R \"Aren\" Teagan PAIGE MD  Orthopaedic Surgery  [facility]  [phone] - [place] Office  [phone] - [place] Office            "

## 2021-04-17 NOTE — NURSING NOTE
"Patient seen at bedside for Access follow-up.  His wife is in recliner next to bed and permission received for her to remain.  Patient is oriented x4.  He complains of poor sleep and states \"I just had a nightmare and I am here because of CHF\".  Patient states his dreams contain \"spiritual warfare, snake imagery and combat\".  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  Patient's wife asked for medication for sleep.  He did not know if the patient had received prior prescription for prazosin to help with nightmares. Pt encouraged to ask doctor If prazosin would fit into current plan of care as may not be a good choice for his situation.  Access will continue to follow.  "

## 2021-04-17 NOTE — PROGRESS NOTES
Chief Complaint:    Rectal pain    Subjective:    The patient states that he had a large bowel movement today with complete resolution of the rectal pain.  There was no blood with the bowel movement.    Objective:    Temp:  [97.6 °F (36.4 °C)-98.5 °F (36.9 °C)] 97.6 °F (36.4 °C)  Heart Rate:  [63-64] 64  Resp:  [16-18] 16  BP: (118-155)/(65-75) 137/73    Physical Exam  Constitutional:       Appearance: He is not ill-appearing or toxic-appearing.   Neurological:      Mental Status: He is alert.   Psychiatric:         Behavior: Behavior is cooperative.         Results:    BUN is 32 and creatinine is 1.44.    Assessment/Plan:    The patient is feeling better regarding the rectal pain.  It has completely resolved there was a large bowel movement.  This suggests that the pain was related to constipation and a large stool volume in the rectal vault.  He will be started on MiraLAX to treat constipation.  I will follow peripherally.    Agustín Bernal Jr., M.D.

## 2021-04-17 NOTE — PLAN OF CARE
Goal Outcome Evaluation:  Plan of Care Reviewed With: patient, spouse     Outcome Summary: VSS patient has been o 2L O2 via N/C, he is alert and oriented x4 LLE swollen with hx of femor fx with surgical procedure Ortho consulted Sr. Candelaria in today may be up with PT/OT if patiet is able to tolerate, Tylenol 650mg for pain, scabbed areas to BLE KEVIN skin is warm dry and intact.  Heperin therapy at this time SCD refused by patient, New orders for Lantus 28U every morning, Lispro 6U with Lunch daily and Lispro 10U @ 1800 with dinner per Dr. De La Rosa.  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  Patient is Ax1 to stand and pivot to BSC, AC/HS accu checks CKD, sleep Apnea BiPap should be in use but pt. refuses.  Urology, Psychology for hx of PTSD, spouse does stay with patient, she has permission.  Currently in bed resting, t.v. on spouse at , tylenol 650mg for left leg pain @ 1645 resting now eyes closed, will CTM

## 2021-04-17 NOTE — PROGRESS NOTES
"   LOS: 3 days   Patient Care Team:  Dakota Avila MD as PCP - General (Family Medicine)  Morris Cai MD as Consulting Physician (Sleep Medicine)  Michaela Hylton MD as Consulting Physician (Cardiology)  Hardy Banerjee MD as Consulting Physician (Ophthalmology)  Chula Christianson MD as Consulting Physician (Ophthalmology)  Jason Sherman MD (Endocrinology)  Perla Maeyn MD as Consulting Physician (Nephrology)  Federico Hebert MD as Consulting Physician (Pulmonary Disease)  Johan Dillard RN as Ambulatory  (Population Health)  Niraj Ravi MD as Consulting Physician (Orthopedic Surgery)  Papa Velasco MD as Consulting Physician (Urology)    Chief Complaint: volume overload     Interval History: Had a BM, had near immediate improvement in rectal pain, but then it randomly occurs. Continues to report poor sleep, anxiety.     Objective   Vital Signs  Temp:  [97.6 °F (36.4 °C)-98.5 °F (36.9 °C)] 97.6 °F (36.4 °C)  Heart Rate:  [63-64] 64  Resp:  [16-18] 16  BP: (118-141)/(65-73) 137/73    Intake/Output Summary (Last 24 hours) at 4/17/2021 1533  Last data filed at 4/17/2021 1235  Gross per 24 hour   Intake 1200 ml   Output 4950 ml   Net -3750 ml       Last Weight and Admission Weight        04/17/21  0600   Weight: 83 kg (182 lb 15.7 oz)     Flowsheet Rows      First Filed Value   Admission Height  175.3 cm (69\") Documented at 04/12/2021 1335   Admission Weight  105 kg (231 lb 7.7 oz) Documented at 04/12/2021 1335        Physical Exam  Constitutional:       General: He is not in acute distress.  HENT:      Nose: Nose normal.      Mouth/Throat:      Pharynx: Oropharynx is clear.   Eyes:      Conjunctiva/sclera: Conjunctivae normal.   Cardiovascular:      Rate and Rhythm: Normal rate.      Heart sounds: Murmur heard.   Systolic murmur is present with a grade of 1/6.        Comments: 1-2+ BLE to thighs; numerous skin tears/scabs, continues to improve  Pulmonary:    "   Effort: Pulmonary effort is normal.      Breath sounds: Examination of the right-lower field reveals decreased breath sounds. Decreased breath sounds present.   Abdominal:      Palpations: Abdomen is soft.      Tenderness: There is abdominal tenderness.   Musculoskeletal:      Right lower leg: Edema present.      Left lower leg: Edema present.   Skin:     General: Skin is warm.      Coloration: Skin is pale.   Neurological:      Mental Status: Mental status is at baseline.   Psychiatric:      Comments: Odd affect, anxious       Results Review:      Results from last 7 days   Lab Units 04/17/21  0722 04/16/21  0511 04/15/21  0859   SODIUM mmol/L 136 138 140   POTASSIUM mmol/L 3.9 3.4* 3.7   CHLORIDE mmol/L 89* 93* 96*   CO2 mmol/L 37.5* 36.2* 32.9*   BUN mg/dL 32* 33* 31*   CREATININE mg/dL 1.44* 1.27 1.43*   GLUCOSE mg/dL 257* 207* 257*   CALCIUM mg/dL 9.4 9.1 8.4*         Results from last 7 days   Lab Units 04/14/21  0328 04/12/21  1452   WBC 10*3/mm3 6.12 6.44   HEMOGLOBIN g/dL 12.5* 12.9*   HEMATOCRIT % 37.6 38.5   PLATELETS 10*3/mm3 192 199             Results from last 7 days   Lab Units 04/16/21  0511   MAGNESIUM mg/dL 1.9         I reviewed the patient's new clinical results.    I personally viewed and interpreted the patient's EKG/Telemetry data        Medication Review:   acetaZOLAMIDE, 250 mg, Oral, Q6H  aspirin, 81 mg, Oral, Daily  bisacodyl, 10 mg, Rectal, Daily  bumetanide, 2 mg, Intravenous, Q6H  [START ON 4/18/2021] bumetanide, 2 mg, Oral, Daily  carvedilol, 12.5 mg, Oral, BID With Meals  enoxaparin, 40 mg, Subcutaneous, Q24H  [START ON 4/18/2021] insulin glargine, 28 Units, Subcutaneous, QAM  insulin lispro, 0-9 Units, Subcutaneous, TID With Meals  insulin lispro, 10 Units, Subcutaneous, Daily With Dinner  [START ON 4/18/2021] insulin lispro, 6 Units, Subcutaneous, Daily With Lunch  [START ON 4/18/2021] polyethylene glycol, 17 g, Oral, Daily  sodium chloride, 10 mL, Intravenous, Q12H  tamsulosin,  0.4 mg, Oral, BID      Assessment/Plan     1. Acute on chronic diastolic CHF  2. Chronic right sided CHF/severe PHTN, RVSP 70mm Hg  3. Longstanding severe essential hypertension  4. CAD s/p CABG  5. AMENA on CKD, stabilizing  6. Combined COCO/CSA  7. IDDM2  8. Urinary retention, history of UTI  9. History of orthopedic fractures/non-weight bearing status    *He is down almost 50 pounds this admission. Continue IV bumetanide; nephrology dosing.  *He has a long history of refractory HTN; his BP is quite good for him  *General surgery did not find any issues on digital rectal exam, his pain improved with BM but has returned; it's not felt that he has prostatitis given normal PSA  *Abnormal urine culture with ESBL Klebsiella is likely colonization, no Abx per ID  *I s/w Dr Candelaria -- he is allowed to WBAT but it's unlikely that he'll tolerate it due to pain/deconditioning, but we can consult PT to start bed exercises at minimum  *He continues to report body-wide pain, anxiety, insomnia . He does not take narcotics/benzodiazepines/sleep aids at home but is asking for them here. His wife worries that he surreptitiously takes diphenhydramine at home. I don't feel comfortable prescribing him any sedating medications given his risk of respiratory depression and his noncompliance with BiPAP.     Cyril Sanchez MD  04/17/21  15:33 EDT

## 2021-04-17 NOTE — PROGRESS NOTES
PROGRESS NOTE      Name: Dilip Verma ADMIT: 2021   : 1950  PCP: Dakota Avila MD    MRN: [id number] LOS: 3 days   AGE/SEX: 71 y.o. male  ROOM: [address] .    Patient seen and examined.  Renal function stable    .         INTERVAL History      Patient is on IV Bumex, great urine output about 5.1 L, negative balance 3.8 L.    REVIEW OF SYSTEMS  Constitutional: + weakness.  HEENT:           No headache, otalgia, itchy eyes, nasal discharge or sore throat.  Cardiac:           No chest pain, orthopnea or PND. +dyspnea  Chest:              No cough, phlegm or wheezing.  Abdomen:        No abdominal pain, nausea or vomiting.  Neuro:             No focal weakness, abnormal movements or seizure-like activity.  :                  History of difficulty urinating  Ext:                  + swelling  ROS was otherwise negative except as mentioned in the [place].       Medications Prior to Admission   Medication Sig Dispense Refill Last Dose   • aspirin 81 MG EC tablet Take 81 mg by mouth Daily.   2021 at Unknown time   • carvedilol (COREG) 12.5 MG tablet Take one tablet twice daily with meals 180 tablet 2 2021 at Unknown time   • Cholecalciferol (Vitamin D) 125 MCG (5000 UT) capsule capsule Take 5,000 Units by mouth Daily.   2021 at Unknown time   • furosemide (LASIX) 40 MG tablet Take 1.5 tablets by mouth 2 (Two) Times a Day.   2021 at Unknown time   • hydrALAZINE (APRESOLINE) 25 MG tablet Take 1 tablet by mouth 2 (Two) Times a Day As Needed (for SBP > 160). 90 tablet 0 2021 at Unknown time   • Insulin Glargine, 2 Unit Dial, (TOUJEO) 300 UNIT/ML solution pen-injector injection Inject 28 Units under the skin into the appropriate area as directed Daily.   2021 at Unknown time   • insulin lispro (humaLOG) 100 UNIT/ML injection Inject 0-14 Units under the skin into the appropriate area as directed 3 (Three) Times a Day Before Meals.  12 2021 at Unknown time   •  "melatonin 3 MG tablet Take 9 mg by mouth Every Night.   Patient Taking Differently at Unknown time   • Oxymetazoline HCl (AFRIN NASAL SPRAY NA) into the nostril(s) as directed by provider.   4/11/2021 at Unknown time   • tamsulosin (FLOMAX) 0.4 MG capsule 24 hr capsule TAKE 2 CAPSULES BY MOUTH AT BEDTIME  (Patient taking differently: Take 1 capsule by mouth 2 (Two) Times a Day.) 60 capsule 0 Patient Taking Differently at Unknown time   • sennosides-docusate (senna-docusate sodium) 8.6-50 MG per tablet Take 2 tablets by mouth 2 (two) times a day. 56 tablet 0 Unknown at Unknown time     Allergies:  Ace inhibitors, Clonidine derivatives, Losartan, Amlodipine, Ativan [lorazepam], Xanax [alprazolam], Minoxidil, and Tetracycline     Objective   PHYSICAL EXAM  /73 (BP Location: Left arm, Patient Position: Sitting)   Pulse 64   Temp 97.6 °F (36.4 °C) (Oral)   Resp 16   Ht 175.3 cm (69\")   Wt 83 kg (182 lb 15.7 oz)   SpO2 97%   BMI 27.02 kg/m²   Intake/Output last 3 shifts:  I/O last 3 completed shifts:  In: 1560 [P.O.:1560]  Out: 6475 [Urine:6475]  Intake/Output this shift:  No intake/output data recorded.         LABS:    CBC    Results from last 7 days   Lab Units 04/14/21  0328 04/12/21  1452   WBC 10*3/mm3 6.12 6.44   HEMOGLOBIN g/dL 12.5* 12.9*   PLATELETS 10*3/mm3 192 199     BMP   Results from last 7 days   Lab Units 04/17/21  0722 04/16/21  0511 04/15/21  0859 04/14/21  0328 04/13/21  0930 04/12/21  1521   SODIUM mmol/L 136 138 140 140 142 140   POTASSIUM mmol/L 3.9 3.4* 3.7 3.3* 3.6 3.7   CHLORIDE mmol/L 89* 93* 96* 97* 101 101   CO2 mmol/L 37.5* 36.2* 32.9* 31.7* 33.1* 27.7   BUN mg/dL 32* 33* 31* 29* 29* 31*   CREATININE mg/dL 1.44* 1.27 1.43* 1.31* 1.41* 1.51*   GLUCOSE mg/dL 257* 207* 257* 181* 221* 177*   MAGNESIUM mg/dL  --  1.9  --   --   --   --    PHOSPHORUS mg/dL  --   --   --  3.7 3.2  --      CMP   Results from last 7 days   Lab Units 04/17/21  0722 04/16/21  0511 04/15/21  0859 " 04/14/21  0328 04/13/21  0930 04/12/21  1521   SODIUM mmol/L 136 138 140 140 142 140   POTASSIUM mmol/L 3.9 3.4* 3.7 3.3* 3.6 3.7   CHLORIDE mmol/L 89* 93* 96* 97* 101 101   CO2 mmol/L 37.5* 36.2* 32.9* 31.7* 33.1* 27.7   BUN mg/dL 32* 33* 31* 29* 29* 31*   CREATININE mg/dL 1.44* 1.27 1.43* 1.31* 1.41* 1.51*   GLUCOSE mg/dL 257* 207* 257* 181* 221* 177*   ALBUMIN g/dL  --   --   --  3.30* 3.30* 3.30*   BILIRUBIN mg/dL  --   --   --   --   --  2.5*   ALK PHOS U/L  --   --   --   --   --  325*   AST (SGOT) U/L  --   --   --   --   --  29   ALT (SGPT) U/L  --   --   --   --   --  23     ABG      Imaging Results (Last 24 Hours)     Procedure Component Value Units Date/Time    XR Foot 3+ View Left [[id number]] Collected: 04/16/21 1359     Updated: 04/16/21 1410    Narrative:      LEFT ANKLE, LEFT FOOT     HISTORY: Left ankle and foot pain since December 2020, patient fell.     COMPARISON: None.     FINDINGS:  LEFT ANKLE: 3 VIEWS: There is generalized soft tissue swelling about the  distal calf, ankle, hindfoot. Microvascular calcifications are present.  There is a small degenerative plantar calcaneal spur. There is a chronic  appearing avulsion fracture at the dorsal talar head with avulsion  fragment measuring approximately 11 mm AP by 3 mm in height.     LEFT FOOT: 3 VIEWS: There is generalized soft tissue swelling about the  left foot. Midfoot alignment appears within normal limits. There is mild  hallux posterior deformity. Bipartite medial hallux sesamoid is present.  There is no evidence for fracture or acute abnormality.       Impression:      1. Generalized soft tissue swelling about the left distal calf, ankle,  foot.  2. Chronic appearing avulsion fracture of the dorsal talar head.  3. No evidence for acute fracture or acute osseous abnormality   4. Microvascular calcifications.     This report was finalized on 4/16/2021 2:07 PM by Dr. Zechariah Falcon M.D.       XR Ankle 3+ View Left [[id number]] Collected:  04/16/21 1359     Updated: 04/16/21 1410    Narrative:      LEFT ANKLE, LEFT FOOT     HISTORY: Left ankle and foot pain since December 2020, patient fell.     COMPARISON: None.     FINDINGS:  LEFT ANKLE: 3 VIEWS: There is generalized soft tissue swelling about the  distal calf, ankle, hindfoot. Microvascular calcifications are present.  There is a small degenerative plantar calcaneal spur. There is a chronic  appearing avulsion fracture at the dorsal talar head with avulsion  fragment measuring approximately 11 mm AP by 3 mm in height.     LEFT FOOT: 3 VIEWS: There is generalized soft tissue swelling about the  left foot. Midfoot alignment appears within normal limits. There is mild  hallux posterior deformity. Bipartite medial hallux sesamoid is present.  There is no evidence for fracture or acute abnormality.       Impression:      1. Generalized soft tissue swelling about the left distal calf, ankle,  foot.  2. Chronic appearing avulsion fracture of the dorsal talar head.  3. No evidence for acute fracture or acute osseous abnormality   4. Microvascular calcifications.     This report was finalized on 4/16/2021 2:07 PM by Dr. Zechariah Falcon M.D.       XR Abdomen KUB [[id number]] Collected: 04/16/21 1339     Updated: 04/16/21 1347    Narrative:      KUB     HISTORY: Left ankle and foot pain. Lower abdominal pain.     COMPARISON: None.     FINDINGS: There is mild to moderate stool throughout the colon. There  are no air-filled dilated bowel loops to suggest obstruction. There is  mild gaseous distention of the stomach. There is a catheter extending to  the central pelvis. Surgical clips are present in the upper abdomen.  There has been gamma nail and cerclage wire fixation of a left proximal  femoral fracture. Fracture extends through the intertrochanteric region  into the lateral subtrochanteric region with varus angulation. There is  no clear bony union.       Impression:      1. Mild-to-moderate stool  throughout the colon. Nonobstructive bowel gas  pattern. Mild gaseous distention of stomach.  2. Previous gamma nail and cerclage wire fixation of the left proximal  femoral fracture that does not appear united and exhibits varus  angulation.     This report was finalized on 4/16/2021 1:44 PM by Dr. Zechariah Falcon M.D.             Results for orders placed during the hospital encounter of 04/02/21    Adult Transthoracic Echo Complete w/ Color, Spectral and Contrast if Necessary Per Protocol    Interpretation Summary  · Left ventricular ejection fraction appears to be 51 - 55%. Left ventricular systolic function is low normal. Normal left ventricular cavity size noted. Left ventricular wall thickness is consistent with moderate concentric hypertrophy. All left ventricular wall segments contract normally. Left ventricular diastolic function is consistent with (grade II w/high LAP) pseudonormalization.  · Calculated right ventricular systolic pressure from tricuspid regurgitation is 70.1 mmHg.      Assessment/Plan   Assessment:        Type 2 diabetes mellitus with hyperglycemia, with long-term current use of insulin (CMS/[facility])    CAD (coronary artery disease)    Acute on chronic diastolic CHF (congestive heart failure) (CMS/[facility])    CSA (central sleep apnea)    HTN (hypertension)    Urinary retention    Acute on chronic renal failure (CMS/HCC)    Acute on chronic diastolic (congestive) heart failure (CMS/[facility])    Bacteriuria    Rectal pain    · Acute kidney injury, with CKD, admission creatinine 1.51 on 4/12, creatinine 4/9 1.72, cr 1.12 on 3/19 Urine studies not available. AMENA possible pre-renal with CHF, fluid overload on exam, recent UTIs, less likely obstruction with light catheter in place draining urine.   · Chronic kidney disease, stage III, baseline creatinine noted around 1.3 on 08/2020. CKD most likely secondary to atherosclerotic disease.   · Acid/base stable  · Electrolytes  · Volume, overload on exam    · Echocardiogram  · Left ventricular ejection fraction appears to be 51 - 55%. Left ventricular systolic function is low normal. Normal left ventricular cavity size noted. Left ventricular wall thickness is consistent with moderate concentric hypertrophy. All left ventricular wall segments contract normally. Left ventricular diastolic function is consistent with (grade II w/high LAP) pseudonormalization.  · Right ventricular systolic pressure from tricuspid regurgitation is 70.1 mmHg  · BP, hypertensive - expect some improvement with aggressive diuresis   · Anemia  · Acute on chronic diastolic CHF  · GNB UTI  · Chronic right sided CHF, severe PHTN, RVSP 70mmHg  · CAD s/p CABG  · COCO,CSA on BiPap  · IDDM2  · Urinary retention, history of UTI  · History of orthopedic fractures, non-weight bearing status     Plan:      ? Renal function is improving.  Back to baseline.   ? Urine analysis grossly abnormal with hematuria and pyuria TNTC, lots of hyaline casts, 4+ bacteria, 3+ leukocyte esterase.   ? Urine protein/creatinine ratio 462mg/G  ? Great urine output, volume is better but there is still needs diuresis.  ? Will continue Bumex IV 2 mg every 6 hours. Switch to po bumex   ? Dc lasix on d/c   ? May change to oral tomorrow.  ? Added Diamox 3 doses more.  ? Supplement 1 dose of potassium chloride.  ? Urology input appreciated  ? I will follow up this patient closely.  ? I had again extensive d/w pt and wife about intermittent self cath than indwelling light cath.  ? Great urine output and negative balance 3.8 L.  ? Strict I/O  ? Daily weights  ? Avoid nephrotoxic medications  ? Patient with indwelling light catheter  ? Cardiology input noted  ? Discussed with patient and spouse at bedside.   ? We will follow closely  ? Cardiology plan noted.  ? Thanks for consultation.          Cristóbal Mayen MD  [facility]  4/17/2021  09:14 EDT

## 2021-04-18 LAB
ANION GAP SERPL CALCULATED.3IONS-SCNC: 9 MMOL/L (ref 5–15)
BUN SERPL-MCNC: 36 MG/DL (ref 8–23)
BUN/CREAT SERPL: 27.3 (ref 7–25)
CALCIUM SPEC-SCNC: 8.8 MG/DL (ref 8.6–10.5)
CHLORIDE SERPL-SCNC: 89 MMOL/L (ref 98–107)
CO2 SERPL-SCNC: 38 MMOL/L (ref 22–29)
CREAT SERPL-MCNC: 1.32 MG/DL (ref 0.76–1.27)
GFR SERPL CREATININE-BSD FRML MDRD: 53 ML/MIN/1.73
GLUCOSE BLDC GLUCOMTR-MCNC: 253 MG/DL (ref 70–130)
GLUCOSE BLDC GLUCOMTR-MCNC: 333 MG/DL (ref 70–130)
GLUCOSE BLDC GLUCOMTR-MCNC: 445 MG/DL (ref 70–130)
GLUCOSE BLDC GLUCOMTR-MCNC: 88 MG/DL (ref 70–130)
GLUCOSE SERPL-MCNC: 338 MG/DL (ref 65–99)
POTASSIUM SERPL-SCNC: 3.5 MMOL/L (ref 3.5–5.2)
SODIUM SERPL-SCNC: 136 MMOL/L (ref 136–145)

## 2021-04-18 PROCEDURE — 80048 BASIC METABOLIC PNL TOTAL CA: CPT | Performed by: INTERNAL MEDICINE

## 2021-04-18 PROCEDURE — 97110 THERAPEUTIC EXERCISES: CPT | Performed by: PHYSICAL THERAPIST

## 2021-04-18 PROCEDURE — 99232 SBSQ HOSP IP/OBS MODERATE 35: CPT | Performed by: INTERNAL MEDICINE

## 2021-04-18 PROCEDURE — 82962 GLUCOSE BLOOD TEST: CPT

## 2021-04-18 PROCEDURE — 25010000002 ENOXAPARIN PER 10 MG: Performed by: INTERNAL MEDICINE

## 2021-04-18 PROCEDURE — 97162 PT EVAL MOD COMPLEX 30 MIN: CPT | Performed by: PHYSICAL THERAPIST

## 2021-04-18 PROCEDURE — 63710000001 INSULIN LISPRO (HUMAN) PER 5 UNITS: Performed by: HOSPITALIST

## 2021-04-18 PROCEDURE — 63710000001 INSULIN GLARGINE PER 5 UNITS: Performed by: HOSPITALIST

## 2021-04-18 RX ORDER — INSULIN GLARGINE 100 [IU]/ML
36 INJECTION, SOLUTION SUBCUTANEOUS EVERY MORNING
Status: DISCONTINUED | OUTPATIENT
Start: 2021-04-19 | End: 2021-04-19

## 2021-04-18 RX ORDER — INSULIN LISPRO 100 [IU]/ML
12 INJECTION, SOLUTION INTRAVENOUS; SUBCUTANEOUS
Status: DISCONTINUED | OUTPATIENT
Start: 2021-04-18 | End: 2021-04-19

## 2021-04-18 RX ORDER — SPIRONOLACTONE 25 MG/1
25 TABLET ORAL DAILY
Status: DISCONTINUED | OUTPATIENT
Start: 2021-04-18 | End: 2021-04-23 | Stop reason: HOSPADM

## 2021-04-18 RX ORDER — BUMETANIDE 2 MG/1
2 TABLET ORAL
Status: DISCONTINUED | OUTPATIENT
Start: 2021-04-18 | End: 2021-04-23

## 2021-04-18 RX ORDER — INSULIN LISPRO 100 [IU]/ML
0-14 INJECTION, SOLUTION INTRAVENOUS; SUBCUTANEOUS
Status: DISCONTINUED | OUTPATIENT
Start: 2021-04-18 | End: 2021-04-19

## 2021-04-18 RX ADMIN — BISACODYL 10 MG: 10 SUPPOSITORY RECTAL at 09:17

## 2021-04-18 RX ADMIN — INSULIN GLARGINE 28 UNITS: 100 INJECTION, SOLUTION SUBCUTANEOUS at 06:24

## 2021-04-18 RX ADMIN — ACETAMINOPHEN 650 MG: 325 TABLET, FILM COATED ORAL at 09:16

## 2021-04-18 RX ADMIN — BUMETANIDE 2 MG: 2 TABLET ORAL at 17:52

## 2021-04-18 RX ADMIN — POLYETHYLENE GLYCOL 3350 17 G: 17 POWDER, FOR SOLUTION ORAL at 09:17

## 2021-04-18 RX ADMIN — INSULIN LISPRO 8 UNITS: 100 INJECTION, SOLUTION INTRAVENOUS; SUBCUTANEOUS at 17:46

## 2021-04-18 RX ADMIN — ASPIRIN 81 MG: 81 TABLET, COATED ORAL at 09:16

## 2021-04-18 RX ADMIN — INSULIN LISPRO 8 UNITS: 100 INJECTION, SOLUTION INTRAVENOUS; SUBCUTANEOUS at 09:17

## 2021-04-18 RX ADMIN — BUMETANIDE 2 MG: 2 TABLET ORAL at 09:17

## 2021-04-18 RX ADMIN — TAMSULOSIN HYDROCHLORIDE 0.4 MG: 0.4 CAPSULE ORAL at 20:18

## 2021-04-18 RX ADMIN — INSULIN LISPRO 14 UNITS: 100 INJECTION, SOLUTION INTRAVENOUS; SUBCUTANEOUS at 12:40

## 2021-04-18 RX ADMIN — SODIUM CHLORIDE, PRESERVATIVE FREE 10 ML: 5 INJECTION INTRAVENOUS at 09:21

## 2021-04-18 RX ADMIN — ENOXAPARIN SODIUM 40 MG: 40 INJECTION SUBCUTANEOUS at 15:56

## 2021-04-18 RX ADMIN — SPIRONOLACTONE 25 MG: 25 TABLET ORAL at 10:40

## 2021-04-18 RX ADMIN — INSULIN LISPRO 12 UNITS: 100 INJECTION, SOLUTION INTRAVENOUS; SUBCUTANEOUS at 17:53

## 2021-04-18 RX ADMIN — SODIUM CHLORIDE, PRESERVATIVE FREE 10 ML: 5 INJECTION INTRAVENOUS at 20:18

## 2021-04-18 RX ADMIN — CARVEDILOL 12.5 MG: 12.5 TABLET, FILM COATED ORAL at 17:49

## 2021-04-18 RX ADMIN — TAMSULOSIN HYDROCHLORIDE 0.4 MG: 0.4 CAPSULE ORAL at 09:16

## 2021-04-18 RX ADMIN — CARVEDILOL 12.5 MG: 12.5 TABLET, FILM COATED ORAL at 09:16

## 2021-04-18 NOTE — NURSING NOTE
"Patient and wife eating ice cream and peanut butter last night after evening meal - per night shift report this morning.  Accu check 338 coverage was provided by night shift nurse.  This afternoon  call placed to [facility] Dr. De La Rosa made aware, changes made to insulin regimen.  Spoke with wife this morning about diet and eating habits of patient.  Offered Diabetic education and a diet menu wife stated, \" well, he enjoys eating.\"  Educated purpose of insulin and proper use of insulin as well as importance of getting patient up to work with PT.  Spouse did refuse PT today desiring that patient eat stating return later.  "

## 2021-04-18 NOTE — SIGNIFICANT NOTE
04/18/21 1230   OTHER   Discipline physical therapist   Rehab Time/Intention   Session Not Performed other (see comments)  (Pt sleeping, wife in room. Wife wanted pt to sleep. Lunch arrived and wife intended to wake pt for lunch, but deferred therapy at this time. Will follow up as time allows. Likely tomorrow. Discussed with RN)

## 2021-04-18 NOTE — PLAN OF CARE
Goal Outcome Evaluation:  Plan of Care Reviewed With: patient, spouse     Outcome Summary: Pt. currently in contact isolation for ESBL colonization of the urine. H/O Left hip ORIF which is not healing ortho consulted - per Ortho order patient is to get up and work with PT.  Patient wife refused PT earlier this shift, PT did return while spouse was gone - pt did sit on bedside indep of any help for therapist as well he stood up with Ax1 to zero out bed, he did favor left leg but was active with therapy this shift.  Insulin regimen adjusted today d/t elevated BS per Dr. De La Rosa nursing staff is to make sure that all condiments on meal tray should be sugar free, pt. is consuming sugar products placed on tray without asking for replacement products.  Spouse has brought personal rolator from home.  Tylenol 650mg for pain tolerating well, Chronic light use r/t urinary retention.  Pt. education completed r/t indwelling light care and increased infection potential, he is unwilling to intermittant self cath at this time.

## 2021-04-18 NOTE — PLAN OF CARE
Problem: Adult Inpatient Plan of Care  Goal: Plan of Care Review  Outcome: Ongoing, Progressing  Flowsheets (Taken 4/18/2021 0567)  Plan of Care Reviewed With:   patient   spouse  Outcome Summary: A/Ox 4, VSS, spouse remains at the BS, 2LO2, pt still refuses to use BiPap to assist with breathing at HS. LLE still remains swollen, however, pt uses BSC with assist x1 stand pivot sits, wife has helped with transferrs this shift. Pt had multiple BM's this shift. Will CTM.   Goal Outcome Evaluation:  Plan of Care Reviewed With: patient, spouse     Outcome Summary: A/Ox 4, VSS, spouse remains at the BS, 2LO2, pt still refuses to use BiPap to assist with breathing at HS. LLE still remains swollen, however, pt uses BSC with assist x1 stand pivot sits, wife has helped with transferrs this shift. Pt had multiple BM's this shift. Will CTM.

## 2021-04-18 NOTE — THERAPY EVALUATION
"Patient Name: Dilip Verma  : 1950    MRN: [id number]                              Today's Date: 2021       Admit Date: 2021    Visit Dx: No diagnosis found.  Patient Active Problem List   Diagnosis   • Anxiety   • Colon polyp   • Type 2 diabetes mellitus with hyperglycemia, with long-term current use of insulin (CMS/[facility])   • Erectile dysfunction   • Hyperlipidemia   • CAD (coronary artery disease)   • Hx of CABG   • Acute on chronic diastolic CHF (congestive heart failure) (CMS/[facility])   • Hypersomnia due to medical condition   • CSA (central sleep apnea)   • Periodic breathing   • HTN (hypertension)   • History of stroke   • CKD (chronic kidney disease) stage 3, GFR 30-59 ml/min (CMS/[facility])   • Urinary retention   • Acute on chronic renal failure (CMS/[facility])   • Acute on chronic diastolic (congestive) heart failure (CMS/[facility])   • Bacteriuria   • Rectal pain     Past Medical History:   Diagnosis Date   • AMENA (acute kidney injury) (CMS/[facility]) 12/3/2020   • Anxiety    • Chronic diastolic (congestive) heart failure (CMS/[facility])    • Chronic kidney disease     stones- had lithotripsy   • CKD (chronic kidney disease) stage 3, GFR 30-59 ml/min (CMS/[facility]) 2020   • Closed nondisplaced intertrochanteric fracture of left femur (CMS/HCC) 2020   • Colon polyp    • Coronary artery disease     CABG 2019   • Diabetes mellitus, type II (CMS/[facility])    • Erectile dysfunction    • H/O bone density study never   • Hyperlipidemia    • Hypersomnia    • Hypertension    • Kidney stone    • Orthostasis    • Periodic breathing    • Routine eye exam    • Sleep apnea     bipap   • Stroke (CMS/[facility])     \"slight stroke\"     Past Surgical History:   Procedure Laterality Date   • CARDIAC CATHETERIZATION N/A 7/15/2019    Procedure: Coronary angiography;  Surgeon: Carrie Price MD;  Location: [facility] INVASIVE LOCATION;  Service: Cardiovascular   • CARDIAC CATHETERIZATION N/A 7/15/2019    Procedure: Left Heart Cath;  " Surgeon: Carrie Price MD;  Location[facility] CATH [facility]VASIVE LOC[facility];  Service: Cardiovascular   • CARDIAC CATHETERIZATION N/A 7/15/2019    Procedure: Left ventriculography;  Surgeon: Carrie Price MD;  [facility] LOC[facility];  Service: Cardiovascular   • CARDIAC CATHETERIZATION  7/15/2019    Procedure: Functional Flow [place];  Surgeon: Carrie Price MD;  [facility];  Service: Cardiovascular   • CARDIAC CATHETERIZATION N/A 11/6/2019    Procedure: Right and Left Heart Cath;  Surgeon: Mya Smith MD;  [facility] C[facility] LOC[facility];  Service: Cardiovascular   • CARDIAC CATHETERIZATION N/A 11/6/2019    Procedure: Coronary angiography;  Surgeon: Mya Smith MD;  [facility];  Service: Cardiovascular   • CATARACT EXTRACTION EXTRACAPSULAR W/ INTRAOCULAR LENS IMPLANTATION Bilateral    • COLONOSCOPY  01/2015    dr jimenez   • CORONARY ARTERY BYPASS GRAFT N/A 7/18/2019    Procedure: INTRAOPERATIVE SHOAIB; STERNOTOMY CORONARY ARTERY BYPASS x 3  USING LEFT INTERNAL MAMMARY ARTERY GRAFT UTILIZING ENDOSCOPICALLY HARVESTED RIGHT GREATER SAPHENOUS VEIN AND PRP.;  Surgeon: Bill Devi MD;  Location: [facility] OR;  Service: Cardiothoracic   • DENTAL PROCEDURE      3 surgeries inder implants   • FEMUR IM NAILING/RODDING Left 12/3/2020    Procedure: LEFT HIP INTRAMEDULLARY NAIL;  Surgeon: Niraj Ravi MD;  Location: [facility] MAIN OR;  Service: Orthopedic Spine;  Laterality: Left;   • HERNIA REPAIR      inguinal bilaterally   • KIDNEY STONE SURGERY      lithotripsy     General Information     Row Name 04/18/21 1643          Physical Therapy Time and Intention    Document Type  evaluation  -     Mode of Treatment  individual therapy;physical therapy  -     Row Name 04/18/21 1643          General Information    Prior Level of Function  -- Prior level of function unclear. Pt is confused, but per pt he walked short distances.  -KATHY      Existing Precautions/Restrictions  fall  -[facility] Name 04/18/21 1643          Cognition    Orientation Status (Cognition)  person;place  -       User Key  (r) = Recorded By, (t) = Taken By, (c) = Cosigned By    Initials Name Provider Type    Abi Castro, PT Physical Therapist        Mobility     Row Name 04/18/21 1650          Bed Mobility    Bed Mobility  rolling right;supine-sit;sit-supine  -     Rolling Right [place] (Bed Mobility)  supervision;verbal cues  -     Supine-Sit [place] (Bed Mobility)  supervision  -     Sit-Supine [place] (Bed Mobility)  minimum assist (75% patient effort)  -     Assistive Device (Bed Mobility)  bed rails;head of bed elevated  -     Row Name 04/18/21 1650          Sit-Stand Transfer    Sit-Stand [place] (Transfers)  moderate assist (50% patient effort);2 person assist  -     Assistive Device (Sit-Stand Transfers)  -- HHA  -       User Key  (r) = Recorded By, (t) = Taken By, (c) = Cosigned By    Initials Name Provider Type    Abi Castro, PT Physical Therapist        Obj/Interventions     [facility] Name 04/18/21 1653          Range of Motion Comprehensive    Comment, General Range of Motion  R hand contracted, B ankle limited 75%, L hip and knee limited 25-50% secondary to pain  -[facility] Name 04/18/21 1653          Strength Comprehensive (MMT)    Comment, General Manual Muscle Testing (MMT) Assessment  BLE 3-5  -[facility] Name 04/18/21 1653          Motor Skills    Therapeutic Exercise  -- BLE AP, LAQ, hip flexion x 10 reps  -KH     Row Name 04/18/21 1653          Balance    Balance Assessment  sitting static balance;sitting dynamic balance;standing static balance;standing dynamic balance  -     Static Sitting Balance  WFL  -     Dynamic Sitting Balance  mild impairment  -     Static Standing Balance  moderate impairment  -[facility] Name 04/18/21 1653          Sensory Assessment (Somatosensory)    Sensory  Assessment (Somatosensory)  -- Pt reports decreased senasation in BLE  -KH       User Key  (r) = Recorded By, (t) = Taken By, (c) = Cosigned By    Initials Name Provider Type    Abi Castro PT Physical Therapist        Goals/Plan     Row Name 04/18/21 1701          Bed Mobility Goal 1 (PT)    Activity/Assistive Device (Bed Mobility Goal 1, PT)  bed mobility activities, all  -KH     [place] Level/Cues Needed (Bed Mobility Goal 1, PT)  contact guard assist  -KH     Time Frame (Bed Mobility Goal 1, PT)  1 week  -KH     Row Name 04/18/21 1701          Transfer Goal 1 (PT)    Activity/Assistive Device (Transfer Goal 1, PT)  transfers, all;walker, rolling  -KH     [place] Level/Cues Needed (Transfer Goal 1, PT)  minimum assist (75% or more patient effort);2 person assist  -KH     Time Frame (Transfer Goal 1, PT)  1 week  -     Row Name 04/18/21 1701          Gait Training Goal 1 (PT)    Activity/Assistive Device (Gait Training Goal 1, PT)  gait (walking locomotion);walker, rolling  -KH     [place] Level (Gait Training Goal 1, PT)  minimum assist (75% or more patient effort);2 person assist  -KH     Distance (Gait Training Goal 1, PT)  25  -KH     Time Frame (Gait Training Goal 1, PT)  1 week  -     Row Name 04/18/21 1701          Patient Education Goal (PT)    Activity (Patient Education Goal, PT)  HEP  -KH     [place]/Cues/Accuracy (Memory Goal 2, PT)  demonstrates adequately  -KH     Time Frame (Patient Education Goal, PT)  1 week  -KH       User Key  (r) = Recorded By, (t) = Taken By, (c) = Cosigned By    Initials Name Provider Type    Abi Castro PT Physical Therapist        Clinical Impression     Row Name 04/18/21 165          Plan of Care Review    Outcome Summary  Pt admitted with CHF and is in contact isolation for ESBL, Klebsiella. Pt has a h/o multiple fracture in LLE. ortho was consulted for these and determined L hip has been fixated, but is not  "healing. Ortho may do surgery in the future if health improved. Pt has orders to mobilize as tolerated WBAT. Pt was agreeable to therapy this evening. he tolerated ROM exercises sitting EOB of bed and was able to stand briefly with assistx 2. Pt would benefit from PT to address ROM, strengthening, balance and gait training. Wife was not present during the eval, but she is pt's primary caregiver. Pt will likely need SNF placement.  -     Row Name 04/18/21 1056          Therapy Assessment/Plan (PT)    Patient/Family Therapy Goals Statement (PT)  pt unable to say, but is open to rehab. States \"there is just one of her to try to take care of me\"  -KATHY     Rehab Potential (PT)  good, to achieve stated therapy goals  -     Criteria for Skilled Interventions Met (PT)  meets criteria  -     Row Name 04/18/21 4329          Positioning and Restraints    Pre-Treatment Position  in bed  -KH     Post Treatment Position  bed  -KH     In Bed  fowlers;call light within reach;encouraged to call for assist;exit alarm on;notified nsg  -       User Key  (r) = Recorded By, (t) = Taken By, (c) = Cosigned By    Initials Name Provider Type    Abi Castro, PT Physical Therapist        Outcome Measures     Row Name 04/18/21 1701          How much help from another person do you currently need...    Turning from your back to your side while in flat bed without using bedrails?  3  -KH     Moving from lying on back to sitting on the side of a flat bed without bedrails?  3  -KH     Moving to and from a bed to a chair (including a wheelchair)?  2  -KH     Standing up from a chair using your arms (e.g., wheelchair, bedside chair)?  2  -KH     Climbing 3-5 steps with a railing?  1  -KH     To walk in hospital room?  1  -KH     AM-PAC 6 Clicks Score (PT)  12  -     Row Name 04/18/21 1701          Functional Assessment    Outcome Measure Options  AM-PAC 6 Clicks Basic Mobility (PT)  -       User Key  (r) = Recorded By, (t) = " Taken By, (c) = Cosigned By    Initials Name Provider Type    Abi Castro PT Physical Therapist        Physical Therapy Education                 Title: PT OT SLP Therapies (In Progress)     Topic: Physical Therapy (In Progress)     Point: Mobility training (In Progress)     Learning Progress Summary           Patient Acceptance, E,D, NR by  at 4/18/2021 1702                   Point: Home exercise program (In Progress)     Learning Progress Summary           Patient Acceptance, E,D, NR by  at 4/18/2021 1702                   Point: Body mechanics (In Progress)     Learning Progress Summary           Patient Acceptance, E,D, NR by  at 4/18/2021 1702                   Point: Precautions (In Progress)     Learning Progress Summary           Patient Acceptance, E,D, NR by  at 4/18/2021 1702                               User Key     Initials Effective Dates Name Provider Type Discipline     02/05/19 -  Abi Hernandez PT Physical Therapist PT              PT Recommendation and Plan  Planned Therapy Interventions (PT): balance training, bed mobility training, gait training, home exercise program, patient/family education, strengthening, ROM (range of motion), transfer training  Outcome Summary: Pt admitted with CHF and is in contact isolation for ESBL, Klebsiella. Pt has a h/o multiple fracture in LLE. ortho was consulted for these and determined L hip has been fixated, but is not healing. Ortho may do surgery in the future if health improved. Pt has orders to mobilize as tolerated WBAT. Pt was agreeable to therapy this evening. he tolerated ROM exercises sitting EOB of bed and was able to stand briefly with assistx 2. Pt would benefit from PT to address ROM, strengthening, balance and gait training. Wife was not present during the eval, but she is pt's primary caregiver. Pt will likely need SNF placement.     Time Calculation:   PT Charges     Row Name 04/18/21 1702 04/18/21 3385           Time Calculation    Start Time  1610  -  --     Stop Time  1645  -  --     Time Calculation (min)  35 min  -  --     PT Received On  04/18/21  -  --     PT - Next Appointment  04/19/21  -  04/19/21  -     PT Goal Re-Cert Due Date  04/25/21  -  --        Time Calculation- PT    Total Timed Code Minutes- PT  20 minute(s)  -  --       User Key  (r) = Recorded By, (t) = Taken By, (c) = Cosigned By    Initials Name Provider Type    Abi Castro, PT Physical Therapist        Therapy Charges for Today     Code Description Service Date Service Provider Modifiers Qty    85616242848 HC PT EVAL MOD COMPLEXITY 2 4/18/2021 Abi Hernandez, PT GP 1    57382414524 HC PT THER PROC EA 15 MIN 4/18/2021 Abi Hernandez, PT GP 1    89163068097 HC PT THER SUPP EA 15 MIN 4/18/2021 Abi Hernandez, PT GP 1          PT G-Codes  Outcome Measure Options: AM-PAC 6 Clicks Basic Mobility (PT)  AM-PAC 6 Clicks Score (PT): 12    Abi Hernandez, PT  4/18/2021

## 2021-04-18 NOTE — PROGRESS NOTES
"   LOS: 4 days   Patient Care Team:  Dakota Avila MD as PCP - General (Family Medicine)  Morris Cai MD as Consulting Physician (Sleep Medicine)  Michaela Hylton MD as Consulting Physician (Cardiology)  Hardy Banerjee MD as Consulting Physician (Ophthalmology)  Chula Christianson MD as Consulting Physician (Ophthalmology)  Jason Sherman MD (Endocrinology)  Perla Mayen MD as Consulting Physician (Nephrology)  Federico Hebert MD as Consulting Physician (Pulmonary Disease)  Johan Dillard, LUC as Ambulatory  ([facility])  Niraj Ravi MD as Consulting Physician (Orthopedic Surgery)  Papa Velasco MD as Consulting Physician (Urology)    Chief Complaint: volume overload     Interval History: Has refused PT. No changes in pain, insomnia, anxiety.     Objective   Vital Signs  Temp:  [97.8 °F (36.6 °C)-98 °F (36.7 °C)] 98 °F (36.7 °C)  Heart Rate:  [63-66] 66  Resp:  [16] 16  BP: (138-151)/(67-77) 151/67    Intake/Output Summary (Last 24 hours) at 4/18/2021 1411  Last data filed at 4/18/2021 0836  Gross per 24 hour   Intake 360 ml   Output 3900 ml   Net -3540 ml       Last Weight and Admission Weight        04/18/21  0540   Weight: 83 kg (183 lb)     Flowsheet Rows      First Filed Value   Admission Height  175.3 cm (69\") Documented at 04/12/2021 1335   Admission Weight  105 kg (231 lb 7.7 oz) Documented at 04/12/2021 1335        Physical Exam  Constitutional:       General: He is not in acute distress.  HENT:      Nose: Nose normal.      Mouth/Throat:      Pharynx: Oropharynx is clear.   Eyes:      Conjunctiva/sclera: Conjunctivae normal.   Cardiovascular:      Rate and Rhythm: Normal rate.      Heart sounds: Murmur heard.   Systolic murmur is present with a grade of 1/6.        Comments: 1+ BLE to thighs; numerous skin tears/scabs, continues to improve  Pulmonary:      Effort: Pulmonary effort is normal.      Breath sounds: Examination of the right-lower " field reveals decreased breath sounds. Decreased breath sounds present.   Abdominal:      Palpations: Abdomen is soft.   Musculoskeletal:      Right lower leg: Edema present.      Left lower leg: Edema present.   Skin:     General: Skin is warm.      Coloration: Skin is pale.   Neurological:      Mental Status: Mental status is at baseline.   Psychiatric:      Comments: Odd affect, anxious       Results Review:      Results from last 7 days   Lab Units 04/18/21  0732 04/17/21  0722 04/16/21  0511   SODIUM mmol/L 136 136 138   POTASSIUM mmol/L 3.5 3.9 3.4*   CHLORIDE mmol/L 89* 89* 93*   CO2 mmol/L 38.0* 37.5* 36.2*   BUN mg/dL 36* 32* 33*   CREATININE mg/dL 1.32* 1.44* 1.27   GLUCOSE mg/dL 338* 257* 207*   CALCIUM mg/dL 8.8 9.4 9.1         Results from last 7 days   Lab Units 04/14/21  0328 04/12/21  1452   WBC 10*3/mm3 6.12 6.44   HEMOGLOBIN g/dL 12.5* 12.9*   HEMATOCRIT % 37.6 38.5   PLATELETS 10*3/mm3 192 199             Results from last 7 days   Lab Units 04/16/21  0511   MAGNESIUM mg/dL 1.9         I reviewed the patient's new clinical results.    I personally viewed and interpreted the patient's EKG/Telemetry data        Medication Review:   aspirin, 81 mg, Oral, Daily  bisacodyl, 10 mg, Rectal, Daily  bumetanide, 2 mg, Oral, BID  carvedilol, 12.5 mg, Oral, BID With Meals  enoxaparin, 40 mg, Subcutaneous, Q24H  [START ON 4/19/2021] insulin glargine, 36 Units, Subcutaneous, QAM  insulin lispro, 0-14 Units, Subcutaneous, TID AC  insulin lispro, 12 Units, Subcutaneous, TID With Meals  polyethylene glycol, 17 g, Oral, Daily  sodium chloride, 10 mL, Intravenous, Q12H  spironolactone, 25 mg, Oral, Daily  tamsulosin, 0.4 mg, Oral, BID      Assessment/Plan     1. Acute on chronic diastolic CHF  2. Chronic right sided CHF/severe PHTN, RVSP 70mm Hg  3. Longstanding severe essential hypertension  4. CAD s/p CABG  5. AMENA on CKD, stabilizing  6. Combined COCO/CSA  7. IDDM2  8. Urinary retention, history of UTI  9. History  of orthopedic fractures/non-weight bearing status    *He is down almost 50 pounds this admission. Nephrology is dosing his diuretics; they switched him to oral bumetanide today and added spironolactone.     *He has a long history of refractory HTN; his BP is quite good for him    *General surgery did not find any issues on digital rectal exam, his pain improved with BM but has returned; it's not felt that he has prostatitis given normal PSA    *Abnormal urine culture with ESBL Klebsiella is likely colonization, no Abx per ID    *I s/w Dr Candelaria -- he is allowed to WBAT but it's unlikely that he'll tolerate it due to pain/deconditioning: they refused pt TODAY    *He continues to report body-wide pain, anxiety, insomnia . He does not take narcotics/benzodiazepines/sleep aids at home but is asking for them here. His wife worries that he surreptitiously takes diphenhydramine at home. I don't feel comfortable prescribing him any sedating medications given his risk of respiratory depression and his noncompliance with BiPAP.     *DM/insulin per [facility]    *It seems unlikely to me that he will be able to return home at discharge and will likely need SNF    Cyril Sanchez MD  04/18/21  14:11 EDT

## 2021-04-18 NOTE — PROGRESS NOTES
Name: Dilip Verma ADMIT: 2021   : 1950  PCP: Dakota Avila MD    MRN: [id number] LOS: 4 days   AGE/SEX: 71 y.o. male  ROOM: [facility]     Subjective   Subjective   No new c/o     Objective   Objective   Vital Signs  Temp:  [97.8 °F (36.6 °C)-98 °F (36.7 °C)] 98 °F (36.7 °C)  Heart Rate:  [63-66] 66  Resp:  [16] 16  BP: (138-151)/(67-77) 151/67  SpO2:  [95 %-97 %] 97 %  on  Flow (L/min):  [2] 2;   Device (Oxygen Therapy): nasal cannula  Body mass index is 27.02 kg/m².     Intake/Output Summary (Last 24 hours) at 2021 1214  Last data filed at 2021 0836  Gross per 24 hour   Intake 480 ml   Output 3900 ml   Net -3420 ml     Wt Readings from Last 1 Encounters:   21 0540 83 kg (183 lb)   21 0600 83 kg (182 lb 15.7 oz)   21 0508 83 kg (182 lb 15.7 oz)   21 0500 83.1 kg (183 lb 3.2 oz)   04/15/21 0600 95.6 kg (210 lb 12.2 oz)   04/15/21 0500 95.6 kg (210 lb 12.2 oz)   21 0700 101 kg (222 lb 0.1 oz)   21 0500 106 kg (234 lb 2.1 oz)   21 1335 105 kg (231 lb 7.7 oz)     Wt Readings from Last 3 Encounters:   21 83 kg (183 lb)   21 96.6 kg (213 lb)   21 96.6 kg (213 lb)       Physical Exam  Constitutional:       General: He is not in acute distress.     Appearance: He is not diaphoretic.   Cardiovascular:      Rate and Rhythm: Normal rate and regular rhythm.      Heart sounds: No murmur heard.     Pulmonary:      Effort: Pulmonary effort is normal.      Breath sounds: Normal breath sounds.   Abdominal:      General: Bowel sounds are normal. There is no distension.      Palpations: Abdomen is soft.      Tenderness: There is no abdominal tenderness.   Genitourinary:     Comments: + light  Musculoskeletal:      Right lower leg: Edema present.      Left lower leg: Edema present.      Comments: Edema seems to be improving   Skin:     General: Skin is warm and dry.   Neurological:      Mental Status: He is alert and oriented to person, place, and  time.       Results Review:       I reviewed the patient's new clinical results.  Results from last 7 days   Lab Units 04/14/21  0328 04/12/21  1452   WBC 10*3/mm3 6.12 6.44   HEMOGLOBIN g/dL 12.5* 12.9*   PLATELETS 10*3/mm3 192 199     Results from last 7 days   Lab Units 04/18/21  0732 04/17/21  0722 04/16/21  0511 04/15/21  0859   SODIUM mmol/L 136 136 138 140   POTASSIUM mmol/L 3.5 3.9 3.4* 3.7   CHLORIDE mmol/L 89* 89* 93* 96*   CO2 mmol/L 38.0* 37.5* 36.2* 32.9*   BUN mg/dL 36* 32* 33* 31*   CREATININE mg/dL 1.32* 1.44* 1.27 1.43*   GLUCOSE mg/dL 338* 257* 207* 257*   Estimated Creatinine Clearance: 59.4 mL/min (A) (by C-G formula based on SCr of 1.32 mg/dL (H)).  Results from last 7 days   Lab Units 04/14/21 0328 04/13/21  0930 04/12/21  1521   ALBUMIN g/dL 3.30* 3.30* 3.30*   BILIRUBIN mg/dL  --   --  2.5*   ALK PHOS U/L  --   --  325*   AST (SGOT) U/L  --   --  29   ALT (SGPT) U/L  --   --  23     Results from last 7 days   Lab Units 04/18/21  0732 04/17/21  0722 04/16/21  0511 04/15/21  0859 04/14/21 0328 04/13/21  0930 04/12/21  1521   CALCIUM mg/dL 8.8 9.4 9.1 8.4* 8.5* 8.3* 8.4*   ALBUMIN g/dL  --   --   --   --  3.30* 3.30* 3.30*   MAGNESIUM mg/dL  --   --  1.9  --   --   --   --    PHOSPHORUS mg/dL  --   --   --   --  3.7 3.2  --        Glucose   Date/Time Value Ref Range Status   04/18/2021 1117 445 (C) 70 - 130 mg/dL Final   04/18/2021 0613 333 (H) 70 - 130 mg/dL Final   04/17/2021 2058 243 (H) 70 - 130 mg/dL Final   04/17/2021 1623 263 (H) 70 - 130 mg/dL Final   04/17/2021 1113 320 (H) 70 - 130 mg/dL Final   04/17/2021 0621 204 (H) 70 - 130 mg/dL Final   04/16/2021 2051 208 (H) 70 - 130 mg/dL Final       aspirin, 81 mg, Oral, Daily  bisacodyl, 10 mg, Rectal, Daily  bumetanide, 2 mg, Oral, BID  carvedilol, 12.5 mg, Oral, BID With Meals  enoxaparin, 40 mg, Subcutaneous, Q24H  insulin glargine, 28 Units, Subcutaneous, QAM  insulin lispro, 0-9 Units, Subcutaneous, TID With Meals  insulin lispro, 10  Units, Subcutaneous, Daily With Dinner  insulin lispro, 6 Units, Subcutaneous, Daily With Lunch  polyethylene glycol, 17 g, Oral, Daily  sodium chloride, 10 mL, Intravenous, Q12H  spironolactone, 25 mg, Oral, Daily  tamsulosin, 0.4 mg, Oral, BID       Diet Regular; Consistent Carbohydrate, Low Sodium; No Added Salt       Assessment/Plan     Active Hospital Problems    Diagnosis  POA   • Bacteriuria [R82.71]  Unknown   • Rectal pain [K62.89]  Unknown   • Acute on chronic diastolic (congestive) heart failure (CMS/[facility]) [I50.33]  Yes   • Acute on chronic renal failure (CMS/[facility]) [N17.9, N18.9]  Unknown   • Urinary retention [R33.9]  Yes   • HTN (hypertension) [I10]  Yes   • CSA (central sleep apnea) [G47.31]  Yes   • Acute on chronic diastolic CHF (congestive heart failure) (CMS/[facility]) [I50.33]  Unknown   • CAD (coronary artery disease) [I25.10]  Yes   • Type 2 diabetes mellitus with hyperglycemia, with long-term current use of insulin (CMS/[facility]) [E11.65, Z79.4]  Not Applicable      Resolved Hospital Problems   No resolved problems to display.       Type 2 diabetes mellitus with hyperglycemia, with long-term current use of insulin     BS much worse as his appetite is improving most likely because he has been eating regular syrup (brought on his tray!), peanut butter, ice cream and candy bars.  Explained to him and wife not to eat anything not on the hospital provided tray and if there is something on there (syrup!) that he knows he shouldn't eat then he should not eat it.  Discussed with RN as well.  Will increase insulin further.  He is currently on home regimen of 28 units of basal and 6 units of short acting with lunch, 10 units with dinner + moderate dose SSI.   Will change to Lantus 36 units nightly + 12 units lispro AC and change moderate to moderate-high dose correctional factor.    A1c 7.6%, no plans to change regimen at discharge.  He follows with an endocrinologist.    Continue daily monitoring and make  adjustments as needed.       Lasha De La Rosa MD  [facility]  04/18/21  12:14 EDT

## 2021-04-18 NOTE — PLAN OF CARE
Goal Outcome Evaluation:        Outcome Summary: Pt admitted with CHF and is in contact isolation for ESBL, Klebsiella. Pt has a h/o multiple fracture in LLE. ortho was consulted for these and determined L hip has been fixated, but is not healing. Ortho may do surgery in the future if health improved. Pt has orders to mobilize as tolerated WBAT. Pt was agreeable to therapy this evening. he tolerated ROM exercises sitting EOB of bed and was able to stand briefly with assistx 2. Pt would benefit from PT to address ROM, strengthening, balance and gait training. Wife was not present during the eval, but she is pt's primary caregiver. Pt will likely need SNF placement.

## 2021-04-18 NOTE — PROGRESS NOTES
"  Infectious Diseases Progress Note    Tino Nagel MD     [facility]  Los: 4 days  Patient Identification:  Name: Dilip Verma  Age: 71 y.o.  Sex: male  :  1950  MRN: [id number]         Primary Care Physician: Dakota Avila MD            Subjective: Comfortable denies any pain and discomfort feeling better denies any fever and chills.  Denies any discomfort in the lower abdomen or genital area and tolerating Hopkins catheter okay.  Interval History: See consultation note.    Objective:    Scheduled Meds:aspirin, 81 mg, Oral, Daily  bisacodyl, 10 mg, Rectal, Daily  bumetanide, 2 mg, Oral, BID  carvedilol, 12.5 mg, Oral, BID With Meals  enoxaparin, 40 mg, Subcutaneous, Q24H  [START ON 2021] insulin glargine, 36 Units, Subcutaneous, QAM  insulin lispro, 0-14 Units, Subcutaneous, TID AC  insulin lispro, 12 Units, Subcutaneous, TID With Meals  polyethylene glycol, 17 g, Oral, Daily  sodium chloride, 10 mL, Intravenous, Q12H  spironolactone, 25 mg, Oral, Daily  tamsulosin, 0.4 mg, Oral, BID      Continuous Infusions:     Vital signs in last 24 hours:  Temp:  [97.8 °F (36.6 °C)-98 °F (36.7 °C)] 98 °F (36.7 °C)  Heart Rate:  [60-66] 60  Resp:  [16] 16  BP: (127-151)/(63-77) 127/63    Intake/Output:    Intake/Output Summary (Last 24 hours) at 2021 1834  Last data filed at 2021 1819  Gross per 24 hour   Intake 600 ml   Output 4900 ml   Net -4300 ml       Exam:  /63   Pulse 60   Temp 98 °F (36.7 °C) (Oral)   Resp 16   Ht 175.3 cm (69\")   Wt 83 kg (183 lb)   SpO2 97%   BMI 27.02 kg/m²   Patient is examined using the personal protective equipment as per guidelines from infection control for this particular patient as enacted.  Hand washing was performed before and after patient interaction.  General Appearance:    Alert, cooperative, no distress, AAOx3                          Head:    Normocephalic, without obvious abnormality, atraumatic                           Eyes:    " PERRL, conjunctivae/corneas clear, EOM's intact, both eyes                         Throat:   Lips, tongue, gums normal; oral mucosa pink and moist                           Neck:   Supple, symmetrical, trachea midline, no JVD                         Lungs:    Clear to auscultation bilaterally, respirations unlabored                 Chest Wall:    No tenderness or deformity                          Heart:    Regular rate and rhythm, S1 and S2 normal, no murmur                  Abdomen:     Soft, non-tender, bowel sounds active, no masses, Hopkins catheter in place                 Extremities:   Extremities normal, atraumatic, no cyanosis or edema                        Pulses:   Pulses palpable in all extremities                            Skin:   Skin is warm and dry,  no rashes or palpable lesions                  Neurologic: Grossly nonfocal examination.    Data Review:    I reviewed the patient's new clinical results.  Results from last 7 days   Lab Units 04/14/21  0328 04/12/21  1452   WBC 10*3/mm3 6.12 6.44   HEMOGLOBIN g/dL 12.5* 12.9*   PLATELETS 10*3/mm3 192 199     Results from last 7 days   Lab Units 04/18/21  0732 04/17/21  0722 04/16/21  0511 04/15/21  0859 04/14/21  0328 04/13/21  0930 04/12/21  1521   SODIUM mmol/L 136 136 138 140 140 142 140   POTASSIUM mmol/L 3.5 3.9 3.4* 3.7 3.3* 3.6 3.7   CHLORIDE mmol/L 89* 89* 93* 96* 97* 101 101   CO2 mmol/L 38.0* 37.5* 36.2* 32.9* 31.7* 33.1* 27.7   BUN mg/dL 36* 32* 33* 31* 29* 29* 31*   CREATININE mg/dL 1.32* 1.44* 1.27 1.43* 1.31* 1.41* 1.51*   CALCIUM mg/dL 8.8 9.4 9.1 8.4* 8.5* 8.3* 8.4*   GLUCOSE mg/dL 338* 257* 207* 257* 181* 221* 177*     Microbiology Results (last 10 days)     Procedure Component Value - Date/Time    COVID PRE-OP / PRE-PROCEDURE SCREENING ORDER (NO ISOLATION) - Swab, Nasopharynx [[id number]]  (Normal) Collected: 04/16/21 2030    Lab Status: Final result Specimen: Swab from Nasopharynx Updated: 04/16/21 2122    Narrative:      The  following orders were created for panel order COVID PRE-OP / PRE-PROCEDURE SCREENING ORDER (NO ISOLATION) - Swab, Nasopharynx.  Procedure                               Abnormality         Status                     ---------                               -----------         ------                     COVID-19,[facility]HOUSE...[[id number]]  Normal              Final result                 Please view results for these tests on the individual orders.    COVID-19,[facility] CEPHEID, NP SWAB IN TRANSPORT MEDIA 8-12 HR TAT - Swab, Nasopharynx [[id number]]  (Normal) Collected: 04/16/21 2030    Lab Status: Final result Specimen: Swab from Nasopharynx Updated: 04/16/21 2122     COVID19 Not Detected    Narrative:      Fact sheet for providers: https://www.fda.gov/media/386882/download     Fact sheet for patients: https://www.fda.gov/media/701359/download    Urine Culture - Urine, Urine, Catheter [[id number]]  (Abnormal)  (Susceptibility) Collected: 04/14/21 0610    Lab Status: Final result Specimen: Urine, Catheter Updated: 04/16/21 0936     Urine Culture >100,000 CFU/mL Klebsiella pneumoniae ESBL     Comment: Consider infectious disease consult.  Susceptibility results may not correlate to clinical outcomes.       Susceptibility      Klebsiella pneumoniae ESBL      KAT      Amikacin Susceptible      Ertapenem Susceptible      Gentamicin Resistant      Levofloxacin Resistant      Meropenem Susceptible      Nitrofurantoin Resistant      Piperacillin + Tazobactam Resistant      Tetracycline Resistant      Tobramycin Intermediate      Trimethoprim + Sulfamethoxazole Resistant               Linear View                           Assessment:    Type 2 diabetes mellitus with hyperglycemia, with long-term current use of insulin (CMS/[facility])    CAD (coronary artery disease)    Acute on chronic diastolic CHF (congestive heart failure) (CMS/[facility])    CSA (central sleep apnea)    HTN (hypertension)    Urinary retention    Acute on chronic  renal failure (CMS/HCC)    Acute on chronic diastolic (congestive) heart failure (CMS/HCC)    Bacteriuria    Rectal pain  Recommendations/discussion:   · Continue to observe off of antibiotic therapy.  · Watch closely for evidence of local or systemic infection that could be attributed to urinary tract infection such as altered mental status fever local discomfort and pain and intolerance to Hopkins catheter.    Tino Nagel MD  4/18/2021  18:34 EDT    Much of this encounter note is an electronic transcription/translation of spoken language to printed text. The electronic translation of spoken language may permit erroneous, or at times, nonsensical words or phrases to be inadvertently transcribed; Although I have reviewed the note for such errors, some may still exist

## 2021-04-18 NOTE — PROGRESS NOTES
PROGRESS NOTE      Name: Dilip Verma ADMIT: 2021   : 1950  PCP: Dakota Avila MD    MRN: [id number] LOS: 4 days   AGE/SEX: 71 y.o. male  ROOM: [address] .    Patient seen and examined.  Renal function stable    .         INTERVAL History      Patient is diuresing well  No labs this am   great urine output about 5.7 L, negative balance 5.3 L.    REVIEW OF SYSTEMS  Constitutional: + weakness.  HEENT:           No headache, otalgia, itchy eyes, nasal discharge or sore throat.  Cardiac:           No chest pain, orthopnea or PND. +dyspnea  Chest:              No cough, phlegm or wheezing.  Abdomen:        No abdominal pain, nausea or vomiting.  Neuro:             No focal weakness, abnormal movements or seizure-like activity.  :                  History of difficulty urinating  Ext:                  + swelling  ROS was otherwise negative except as mentioned in the [place].       Medications Prior to Admission   Medication Sig Dispense Refill Last Dose   • aspirin 81 MG EC tablet Take 81 mg by mouth Daily.   2021 at Unknown time   • carvedilol (COREG) 12.5 MG tablet Take one tablet twice daily with meals 180 tablet 2 2021 at Unknown time   • Cholecalciferol (Vitamin D) 125 MCG (5000 UT) capsule capsule Take 5,000 Units by mouth Daily.   2021 at Unknown time   • furosemide (LASIX) 40 MG tablet Take 1.5 tablets by mouth 2 (Two) Times a Day.   2021 at Unknown time   • hydrALAZINE (APRESOLINE) 25 MG tablet Take 1 tablet by mouth 2 (Two) Times a Day As Needed (for SBP > 160). 90 tablet 0 2021 at Unknown time   • Insulin Glargine, 2 Unit Dial, (TOUJEO) 300 UNIT/ML solution pen-injector injection Inject 28 Units under the skin into the appropriate area as directed Daily.   2021 at Unknown time   • insulin lispro (humaLOG) 100 UNIT/ML injection Inject 0-14 Units under the skin into the appropriate area as directed 3 (Three) Times a Day Before Meals.  12 2021 at  "Unknown time   • melatonin 3 MG tablet Take 9 mg by mouth Every Night.   Patient Taking Differently at Unknown time   • Oxymetazoline HCl (AFRIN NASAL SPRAY NA) into the nostril(s) as directed by provider.   4/11/2021 at Unknown time   • tamsulosin (FLOMAX) 0.4 MG capsule 24 hr capsule TAKE 2 CAPSULES BY MOUTH AT BEDTIME  (Patient taking differently: Take 1 capsule by mouth 2 (Two) Times a Day.) 60 capsule 0 Patient Taking Differently at Unknown time   • sennosides-docusate (senna-docusate sodium) 8.6-50 MG per tablet Take 2 tablets by mouth 2 (two) times a day. 56 tablet 0 Unknown at Unknown time     Allergies:  Ace inhibitors, Clonidine derivatives, Losartan, Amlodipine, Ativan [lorazepam], Xanax [alprazolam], Minoxidil, and Tetracycline     Objective   PHYSICAL EXAM  /69 (BP Location: Left arm, Patient Position: Lying)   Pulse 63   Temp 97.8 °F (36.6 °C) (Oral)   Resp 16   Ht 175.3 cm (69\")   Wt 83 kg (183 lb)   SpO2 95%   BMI 27.02 kg/m²   Intake/Output last 3 shifts:  I/O last 3 completed shifts:  In: 1680 [P.O.:1680]  Out: 8450 [Urine:8450]  Intake/Output this shift:  I/O this shift:  In: -   Out: 2400 [Urine:2400]         LABS:    CBC    Results from last 7 days   Lab Units 04/14/21  0328 04/12/21  1452   WBC 10*3/mm3 6.12 6.44   HEMOGLOBIN g/dL 12.5* 12.9*   PLATELETS 10*3/mm3 192 199     BMP   Results from last 7 days   Lab Units 04/17/21  0722 04/16/21  0511 04/15/21  0859 04/14/21  0328 04/13/21  0930 04/12/21  1521   SODIUM mmol/L 136 138 140 140 142 140   POTASSIUM mmol/L 3.9 3.4* 3.7 3.3* 3.6 3.7   CHLORIDE mmol/L 89* 93* 96* 97* 101 101   CO2 mmol/L 37.5* 36.2* 32.9* 31.7* 33.1* 27.7   BUN mg/dL 32* 33* 31* 29* 29* 31*   CREATININE mg/dL 1.44* 1.27 1.43* 1.31* 1.41* 1.51*   GLUCOSE mg/dL 257* 207* 257* 181* 221* 177*   MAGNESIUM mg/dL  --  1.9  --   --   --   --    PHOSPHORUS mg/dL  --   --   --  3.7 3.2  --      CMP   Results from last 7 days   Lab Units 04/17/21  0722 04/16/21  0511 " 04/15/21  0859 04/14/21  0328 04/13/21  0930 04/12/21  1521   SODIUM mmol/L 136 138 140 140 142 140   POTASSIUM mmol/L 3.9 3.4* 3.7 3.3* 3.6 3.7   CHLORIDE mmol/L 89* 93* 96* 97* 101 101   CO2 mmol/L 37.5* 36.2* 32.9* 31.7* 33.1* 27.7   BUN mg/dL 32* 33* 31* 29* 29* 31*   CREATININE mg/dL 1.44* 1.27 1.43* 1.31* 1.41* 1.51*   GLUCOSE mg/dL 257* 207* 257* 181* 221* 177*   ALBUMIN g/dL  --   --   --  3.30* 3.30* 3.30*   BILIRUBIN mg/dL  --   --   --   --   --  2.5*   ALK PHOS U/L  --   --   --   --   --  325*   AST (SGOT) U/L  --   --   --   --   --  29   ALT (SGPT) U/L  --   --   --   --   --  23     ABG      Imaging Results (Last 24 Hours)     ** No results found for the last 24 hours. **          Results for orders placed during the hospital encounter of 04/02/21    Adult Transthoracic Echo Complete w/ Color, Spectral and Contrast if Necessary Per Protocol    Interpretation Summary  · Left ventricular ejection fraction appears to be 51 - 55%. Left ventricular systolic function is low normal. Normal left ventricular cavity size noted. Left ventricular wall thickness is consistent with moderate concentric hypertrophy. All left ventricular wall segments contract normally. Left ventricular diastolic function is consistent with (grade II w/high LAP) pseudonormalization.  · Calculated right ventricular systolic pressure from tricuspid regurgitation is 70.1 mmHg.      Assessment/Plan   Assessment:        Type 2 diabetes mellitus with hyperglycemia, with long-term current use of insulin (CMS/[facility])    CAD (coronary artery disease)    Acute on chronic diastolic CHF (congestive heart failure) (CMS/[facility])    CSA (central sleep apnea)    HTN (hypertension)    Urinary retention    Acute on chronic renal failure (CMS/HCC)    Acute on chronic diastolic (congestive) heart failure (CMS/[facility])    Bacteriuria    Rectal pain    · Acute kidney injury, with CKD, admission creatinine 1.51 on 4/12, creatinine 4/9 1.72, cr 1.12 on 3/19 Urine  studies not available. AMENA possible pre-renal with CHF, fluid overload on exam, recent UTIs, less likely obstruction with light catheter in place draining urine.   · Chronic kidney disease, stage III, baseline creatinine noted around 1.3 on 08/2020. CKD most likely secondary to atherosclerotic disease.   · Acid/base stable  · Electrolytes  · Volume, overload on exam   · Echocardiogram  · Left ventricular ejection fraction appears to be 51 - 55%. Left ventricular systolic function is low normal. Normal left ventricular cavity size noted. Left ventricular wall thickness is consistent with moderate concentric hypertrophy. All left ventricular wall segments contract normally. Left ventricular diastolic function is consistent with (grade II w/high LAP) pseudonormalization.  · Right ventricular systolic pressure from tricuspid regurgitation is 70.1 mmHg  · BP, hypertensive - expect some improvement with aggressive diuresis   · Anemia  · Acute on chronic diastolic CHF  · GNB UTI  · Chronic right sided CHF, severe PHTN, RVSP 70mmHg  · CAD s/p CABG  · COCO,CSA on BiPap  · IDDM2  · Urinary retention, history of UTI  · History of orthopedic fractures, non-weight bearing status     Plan:      ? Renal function is improving.  Back to baseline.   ? Urine analysis grossly abnormal with hematuria and pyuria TNTC, lots of hyaline casts, 4+ bacteria, 3+ leukocyte esterase.   ? Urine protein/creatinine ratio 462mg/G  ? Great urine output, volume is better but there is still needs diuresis.  ? Dc iv bumex today  ?  Switch to po bumex 2 bid  ? Dc lasix on d/c   ? Supplement potassium chloride as needed  ? Will start aldactone 25 mg po daily  ? Urology input appreciated  ? I will follow up this patient closely.  ? I had again extensive d/w pt and wife about intermittent self cath, pt still not ready  ? Great urine output and negative balance 3.8 L.  ? Strict I/O  ? Daily weights  ? Avoid nephrotoxic medications  ? Patient with indwelling  light catheter  ? Cardiology input noted  ? Discussed with patient and spouse at bedside.   ? We will follow closely  ? Cardiology plan noted.  ? Thanks for consultation.          Cristóbal Mayen MD  [facility]  4/18/2021  06:23 EDT

## 2021-04-19 LAB
ANION GAP SERPL CALCULATED.3IONS-SCNC: 9.1 MMOL/L (ref 5–15)
BUN SERPL-MCNC: 39 MG/DL (ref 8–23)
BUN/CREAT SERPL: 24.7 (ref 7–25)
CALCIUM SPEC-SCNC: 9 MG/DL (ref 8.6–10.5)
CHLORIDE SERPL-SCNC: 89 MMOL/L (ref 98–107)
CO2 SERPL-SCNC: 37.9 MMOL/L (ref 22–29)
CREAT SERPL-MCNC: 1.58 MG/DL (ref 0.76–1.27)
GFR SERPL CREATININE-BSD FRML MDRD: 43 ML/MIN/1.73
GLUCOSE BLDC GLUCOMTR-MCNC: 130 MG/DL (ref 70–130)
GLUCOSE BLDC GLUCOMTR-MCNC: 154 MG/DL (ref 70–130)
GLUCOSE BLDC GLUCOMTR-MCNC: 189 MG/DL (ref 70–130)
GLUCOSE BLDC GLUCOMTR-MCNC: 228 MG/DL (ref 70–130)
GLUCOSE BLDC GLUCOMTR-MCNC: 246 MG/DL (ref 70–130)
GLUCOSE SERPL-MCNC: 231 MG/DL (ref 65–99)
POTASSIUM SERPL-SCNC: 3.5 MMOL/L (ref 3.5–5.2)
SODIUM SERPL-SCNC: 136 MMOL/L (ref 136–145)

## 2021-04-19 PROCEDURE — 63710000001 INSULIN GLARGINE PER 5 UNITS: Performed by: HOSPITALIST

## 2021-04-19 PROCEDURE — 25010000002 ENOXAPARIN PER 10 MG: Performed by: INTERNAL MEDICINE

## 2021-04-19 PROCEDURE — 99233 SBSQ HOSP IP/OBS HIGH 50: CPT | Performed by: INTERNAL MEDICINE

## 2021-04-19 PROCEDURE — 97166 OT EVAL MOD COMPLEX 45 MIN: CPT

## 2021-04-19 PROCEDURE — 97110 THERAPEUTIC EXERCISES: CPT

## 2021-04-19 PROCEDURE — 80048 BASIC METABOLIC PNL TOTAL CA: CPT | Performed by: INTERNAL MEDICINE

## 2021-04-19 PROCEDURE — 82962 GLUCOSE BLOOD TEST: CPT

## 2021-04-19 PROCEDURE — 63710000001 INSULIN LISPRO (HUMAN) PER 5 UNITS: Performed by: INTERNAL MEDICINE

## 2021-04-19 PROCEDURE — 97535 SELF CARE MNGMENT TRAINING: CPT

## 2021-04-19 PROCEDURE — 63710000001 INSULIN LISPRO (HUMAN) PER 5 UNITS: Performed by: HOSPITALIST

## 2021-04-19 RX ORDER — INSULIN LISPRO 100 [IU]/ML
0-24 INJECTION, SOLUTION INTRAVENOUS; SUBCUTANEOUS
Status: DISCONTINUED | OUTPATIENT
Start: 2021-04-19 | End: 2021-04-21

## 2021-04-19 RX ORDER — INSULIN LISPRO 100 [IU]/ML
6 INJECTION, SOLUTION INTRAVENOUS; SUBCUTANEOUS
Status: DISCONTINUED | OUTPATIENT
Start: 2021-04-19 | End: 2021-04-23 | Stop reason: HOSPADM

## 2021-04-19 RX ORDER — INSULIN GLARGINE 100 [IU]/ML
28 INJECTION, SOLUTION SUBCUTANEOUS EVERY MORNING
Status: DISCONTINUED | OUTPATIENT
Start: 2021-04-20 | End: 2021-04-23 | Stop reason: HOSPADM

## 2021-04-19 RX ADMIN — INSULIN GLARGINE 36 UNITS: 100 INJECTION, SOLUTION SUBCUTANEOUS at 06:18

## 2021-04-19 RX ADMIN — CARVEDILOL 12.5 MG: 12.5 TABLET, FILM COATED ORAL at 09:23

## 2021-04-19 RX ADMIN — Medication 3 MG: at 20:05

## 2021-04-19 RX ADMIN — INSULIN LISPRO 6 UNITS: 100 INJECTION, SOLUTION INTRAVENOUS; SUBCUTANEOUS at 12:09

## 2021-04-19 RX ADMIN — TAMSULOSIN HYDROCHLORIDE 0.4 MG: 0.4 CAPSULE ORAL at 09:23

## 2021-04-19 RX ADMIN — INSULIN LISPRO 5 UNITS: 100 INJECTION, SOLUTION INTRAVENOUS; SUBCUTANEOUS at 09:24

## 2021-04-19 RX ADMIN — SODIUM CHLORIDE, PRESERVATIVE FREE 10 ML: 5 INJECTION INTRAVENOUS at 20:05

## 2021-04-19 RX ADMIN — SPIRONOLACTONE 25 MG: 25 TABLET ORAL at 10:24

## 2021-04-19 RX ADMIN — INSULIN LISPRO 8 UNITS: 100 INJECTION, SOLUTION INTRAVENOUS; SUBCUTANEOUS at 12:09

## 2021-04-19 RX ADMIN — TAMSULOSIN HYDROCHLORIDE 0.4 MG: 0.4 CAPSULE ORAL at 20:05

## 2021-04-19 RX ADMIN — ACETAMINOPHEN 650 MG: 325 TABLET, FILM COATED ORAL at 09:23

## 2021-04-19 RX ADMIN — ENOXAPARIN SODIUM 40 MG: 40 INJECTION SUBCUTANEOUS at 14:43

## 2021-04-19 RX ADMIN — BUMETANIDE 2 MG: 2 TABLET ORAL at 10:24

## 2021-04-19 RX ADMIN — ACETAMINOPHEN 650 MG: 325 TABLET, FILM COATED ORAL at 20:05

## 2021-04-19 RX ADMIN — BUMETANIDE 2 MG: 2 TABLET ORAL at 18:22

## 2021-04-19 RX ADMIN — ASPIRIN 81 MG: 81 TABLET, COATED ORAL at 09:23

## 2021-04-19 RX ADMIN — SODIUM CHLORIDE, PRESERVATIVE FREE 10 ML: 5 INJECTION INTRAVENOUS at 09:25

## 2021-04-19 RX ADMIN — CARVEDILOL 12.5 MG: 12.5 TABLET, FILM COATED ORAL at 18:22

## 2021-04-19 NOTE — PROGRESS NOTES
Name: Dilip Verma ADMIT: 2021   : 1950  PCP: Dakota Avila MD    MRN: [id number] LOS: 5 days   AGE/SEX: 71 y.o. male  ROOM: [facility]   Subjective   No chief complaint on file.    On oral diuertics  On insulin  BG 70s-445  Urinary retention with likely colonization per ID    ROS  No f/c  No n/v  No cp/palp  No soa/cough    Objective   Vital Signs  Temp:  [96.8 °F (36 °C)-97.4 °F (36.3 °C)] 97.1 °F (36.2 °C)  Heart Rate:  [60-63] 63  Resp:  [16-18] 18  BP: (107-127)/(45-63) 107/56  SpO2:  [90 %-97 %] 90 %  on  Flow (L/min):  [2] 2;   Device (Oxygen Therapy): nasal cannula  Body mass index is 27.09 kg/m².    Physical Exam  Constitutional:       Appearance: He is well-developed.   HENT:      Head: Normocephalic and atraumatic.   Eyes:      General: No scleral icterus.  Cardiovascular:      Rate and Rhythm: Normal rate and regular rhythm.      Heart sounds: Normal heart sounds.   Pulmonary:      Effort: Pulmonary effort is normal. No respiratory distress.      Breath sounds: Normal breath sounds.   Abdominal:      General: There is no distension.      Palpations: Abdomen is soft.      Tenderness: There is no abdominal tenderness.   Musculoskeletal:      Cervical back: Neck supple.   Neurological:      Mental Status: He is alert.   Psychiatric:         Behavior: Behavior normal.         Results Review:       I reviewed the patient's new clinical results.  Results from last 7 days   Lab Units 21  0328 21  1452   WBC 10*3/mm3 6.12 6.44   HEMOGLOBIN g/dL 12.5* 12.9*   PLATELETS 10*3/mm3 192 199     Results from last 7 days   Lab Units 21  0654 21  0732 21  0722 21  0511   SODIUM mmol/L 136 136 136 138   POTASSIUM mmol/L 3.5 3.5 3.9 3.4*   CHLORIDE mmol/L 89* 89* 89* 93*   CO2 mmol/L 37.9* 38.0* 37.5* 36.2*   BUN mg/dL 39* 36* 32* 33*   CREATININE mg/dL 1.58* 1.32* 1.44* 1.27   GLUCOSE mg/dL 231* 338* 257* 207*   Estimated Creatinine Clearance: 49.7 mL/min (A) (by C-G  formula based on SCr of 1.58 mg/dL (H)).  Results from last 7 days   Lab Units 04/14/21  0328 04/13/21  0930 04/12/21  1521   ALBUMIN g/dL 3.30* 3.30* 3.30*   BILIRUBIN mg/dL  --   --  2.5*   ALK PHOS U/L  --   --  325*   AST (SGOT) U/L  --   --  29   ALT (SGPT) U/L  --   --  23     Results from last 7 days   Lab Units 04/19/21  0654 04/18/21  0732 04/17/21  0722 04/16/21  0511 04/14/21  0328 04/13/21  0930 04/12/21  1521   CALCIUM mg/dL 9.0 8.8 9.4 9.1 8.5* 8.3* 8.4*   ALBUMIN g/dL  --   --   --   --  3.30* 3.30* 3.30*   MAGNESIUM mg/dL  --   --   --  1.9  --   --   --    PHOSPHORUS mg/dL  --   --   --   --  3.7 3.2  --          Coag     HbA1C   Lab Results   Component Value Date    HGBA1C 7.60 (H) 04/12/2021    HGBA1C 8.30 (H) 12/18/2020    HGBA1C 9.50 (H) 12/02/2020     Infection   Results from last 7 days   Lab Units 04/14/21  0610   URINECX  >100,000 CFU/mL Klebsiella pneumoniae ESBL*     Radiology(recent) No radiology results for the last day  No results found for: TROPONINT, TROPONINI, BNP  No components found for: TSH;2    aspirin, 81 mg, Oral, Daily  bisacodyl, 10 mg, Rectal, Daily  bumetanide, 2 mg, Oral, BID  carvedilol, 12.5 mg, Oral, BID With Meals  enoxaparin, 40 mg, Subcutaneous, Q24H  insulin glargine, 36 Units, Subcutaneous, QAM  insulin lispro, 0-14 Units, Subcutaneous, TID AC  insulin lispro, 12 Units, Subcutaneous, TID With Meals  polyethylene glycol, 17 g, Oral, Daily  sodium chloride, 10 mL, Intravenous, Q12H  spironolactone, 25 mg, Oral, Daily  tamsulosin, 0.4 mg, Oral, BID       Diet Regular; Consistent Carbohydrate, Low Sodium; No Added Salt      Assessment/Plan      Active Hospital Problems    Diagnosis  POA   • Bacteriuria [R82.71]  Unknown   • Rectal pain [K62.89]  Unknown   • Acute on chronic diastolic (congestive) heart failure (CMS/HCC) [I50.33]  Yes   • Acute on chronic renal failure (CMS/HCC) [N17.9, N18.9]  Unknown   • Urinary retention [R33.9]  Yes   • HTN (hypertension) [I10]  Yes    • CSA (central sleep apnea) [G47.31]  Yes   • Acute on chronic diastolic CHF (congestive heart failure) (CMS/[facility]) [I50.33]  Unknown   • CAD (coronary artery disease) [I25.10]  Yes   • Type 2 diabetes mellitus with hyperglycemia, with long-term current use of insulin (CMS/[facility]) [E11.65, Z79.4]  Not Applicable      Resolved Hospital Problems   No resolved problems to display.       Type 2 diabetes mellitus with hyperglycemia, with long-term current use of insulin      · BS worse yesterday as his appetite is improving most likely because he has been eating regular syrup, peanut butter, ice cream and candy bars. Dr. De La Rosa explained to him and wife not to eat anything not on the hospital provided tray and if there is something on there that he knows he shouldn't eat then he should not eat it.   · Now on Lantus 36 units nightly + 12 units lispro AC and change moderate to moderate-high dose correctional factor.  Im actually going to decrease back to close to his home meds but increase SSI to avoid hypoglycemia.   · A1c 7.6%, no plans to change regimen at discharge.  He follows with an endocrinologist.    · Continue daily monitoring and make adjustments as needed.       DW staff  Reviewed records  OK for dc from medicine perspective on home insulin.       Jama Benson MD  [facility]  04/19/21  09:39 EDT

## 2021-04-19 NOTE — PROGRESS NOTES
Access Ctr Note.    Chart reviewed.  Pt and wife both sleeping in room at time of arrival; did not wake Pt.  Access following.

## 2021-04-19 NOTE — THERAPY EVALUATION
"Patient Name: Dilip Verma  : 1950    MRN: [id number]                              Today's Date: 2021       Admit Date: 2021    Visit Dx: No diagnosis found.  Patient Active Problem List   Diagnosis   • Anxiety   • Colon polyp   • Type 2 diabetes mellitus with hyperglycemia, with long-term current use of insulin (CMS/[facility])   • Erectile dysfunction   • Hyperlipidemia   • CAD (coronary artery disease)   • Hx of CABG   • Acute on chronic diastolic CHF (congestive heart failure) (CMS/[facility])   • Hypersomnia due to medical condition   • CSA (central sleep apnea)   • Periodic breathing   • HTN (hypertension)   • History of stroke   • CKD (chronic kidney disease) stage 3, GFR 30-59 ml/min (CMS/[facility])   • Urinary retention   • Acute on chronic renal failure (CMS/[facility])   • Acute on chronic diastolic (congestive) heart failure (CMS/[facility])   • Bacteriuria   • Rectal pain     Past Medical History:   Diagnosis Date   • AMENA (acute kidney injury) (CMS/[facility]) 12/3/2020   • Anxiety    • Chronic diastolic (congestive) heart failure (CMS/[facility])    • Chronic kidney disease     stones- had lithotripsy   • CKD (chronic kidney disease) stage 3, GFR 30-59 ml/min (CMS/[facility]) 2020   • Closed nondisplaced intertrochanteric fracture of left femur (CMS/HCC) 2020   • Colon polyp    • Coronary artery disease     CABG 2019   • Diabetes mellitus, type II (CMS/[facility])    • Erectile dysfunction    • H/O bone density study never   • Hyperlipidemia    • Hypersomnia    • Hypertension    • Kidney stone    • Orthostasis    • Periodic breathing    • Routine eye exam    • Sleep apnea     bipap   • Stroke (CMS/[facility])     \"slight stroke\"     Past Surgical History:   Procedure Laterality Date   • CARDIAC CATHETERIZATION N/A 7/15/2019    Procedure: Coronary angiography;  Surgeon: Carrie Price MD;  Location: [facility] INVASIVE LOCATION;  Service: Cardiovascular   • CARDIAC CATHETERIZATION N/A 7/15/2019    Procedure: Left Heart Cath;  " Surgeon: Carrie Price MD;  Location: [facility] CATH INVASIVE LOCATION;  Service: Cardiovascular   • CARDIAC CATHETERIZATION N/A 7/15/2019    Procedure: Left ventriculography;  Surgeon: Carrie Price MD;  Location: [facility] CATH INVASIVE LOCATION;  Service: Cardiovascular   • CARDIAC CATHETERIZATION  7/15/2019    Procedure: Functional Flow [place];  Surgeon: Carrie Price MD;  Location: [facility] CATH INVASIVE LOCATION;  Service: Cardiovascular   • CARDIAC CATHETERIZATION N/A 11/6/2019    Procedure: Right and Left Heart Cath;  Surgeon: Mya Smith MD;  Location: [facility] CATH INVASIVE LOCATION;  Service: Cardiovascular   • CARDIAC CATHETERIZATION N/A 11/6/2019    Procedure: Coronary angiography;  Surgeon: Mya Smith MD;  Location: [facility] CATH INVASIVE LOCATION;  Service: Cardiovascular   • CATARACT EXTRACTION EXTRACAPSULAR W/ INTRAOCULAR LENS IMPLANTATION Bilateral    • COLONOSCOPY  01/2015    dr [email or web address]   • CORONARY ARTERY BYPASS GRAFT N/A 7/18/2019    Procedure: INTRAOPERATIVE SHOAIB; STERNOTOMY CORONARY ARTERY BYPASS x 3  USING LEFT INTERNAL MAMMARY ARTERY GRAFT UTILIZING ENDOSCOPICALLY HARVESTED RIGHT GREATER SAPHENOUS VEIN AND PRP.;  Surgeon: Bill Devi MD;  Location: [facility] MAIN OR;  Service: Cardiothoracic   • DENTAL PROCEDURE      3 surgeries inder implants   • FEMUR IM NAILING/RODDING Left 12/3/2020    Procedure: LEFT HIP INTRAMEDULLARY NAIL;  Surgeon: Niraj Ravi MD;  Location: [facility] MAIN OR;  Service: Orthopedic Spine;  Laterality: Left;   • HERNIA REPAIR      inguinal bilaterally   • KIDNEY STONE SURGERY      lithotripsy     General Information     Row Name 04/19/21 1535          OT Time and Intention    Document Type  evaluation  -RB     Mode of Treatment  individual therapy;occupational therapy  -RB     Row Name 04/19/21 1535          General Information    Patient Profile Reviewed  yes  -RB     Prior Level of Function  mod assist:;ADL's;transfer  -RB     Existing Precautions/Restrictions   fall  -RB     Barriers to Rehab  medically complex;previous functional deficit;cognitive status  -RB     Row Name 04/19/21 1535          Living Environment    Lives With  spouse  -RB     Row Name 04/19/21 1535          Home Main Entrance    Number of Stairs, Main Entrance  -- ramp  -RB     Row Name 04/19/21 1535          Cognition    Orientation Status (Cognition)  oriented to;person;place  -RB     Row Name 04/19/21 1535          Safety Issues, Functional Mobility    Safety Issues Affecting Function (Mobility)  awareness of need for assistance;ability to follow commands;insight into deficits/self-awareness;judgment;positioning of assistive device;impulsivity;problem-solving;safety precaution awareness;safety precautions follow-through/compliance;sequencing abilities  -RB     Impairments Affecting Function (Mobility)  balance;cognition;endurance/activity tolerance;coordination;grasp;motor control;postural/trunk control;range of motion (ROM);sensation/sensory awareness;shortness of breath;strength;visual/perceptual  -RB       User Key  (r) = Recorded By, (t) = Taken By, (c) = Cosigned By    Initials Name Provider Type    RB Rhoda Stevens OT Occupational Therapist          Mobility/ADL's     Row Name 04/19/21 1537          Bed Mobility    Bed Mobility  supine-sit;sit-supine  -RB     Supine-Sit [place] (Bed Mobility)  minimum assist (75% patient effort);verbal cues;nonverbal cues (demo/gesture)  -RB     Sit-Supine [place] (Bed Mobility)  minimum assist (75% patient effort);verbal cues;nonverbal cues (demo/gesture)  -RB     Bed Mobility, Safety Issues  decreased use of arms for pushing/pulling;decreased use of legs for bridging/pushing;impaired trunk control for bed mobility  -RB     Assistive Device (Bed Mobility)  bed rails;draw sheet;head of bed elevated  -RB     Comment (Bed Mobility)  Cues to sustain his attention to therapist.  -RB     Row Name 04/19/21 1537          Transfers    Transfers  sit-stand  transfer  -RB     Comment (Transfers)  Mod A to stand from rollator at sinkside due to impaired safety awareness and balance deficits.  -RB     Sit-Stand [place] (Transfers)  minimum assist (75% patient effort);verbal cues;2 person assist;nonverbal cues (demo/gesture)  -RB     Row Name 04/19/21 1537          Sit-Stand Transfer    Assistive Device (Sit-Stand Transfers)  -- rollator  -RB     Row Name 04/19/21 1537          Functional Mobility    Functional Mobility- Ind. Level  minimum assist (75% patient effort);2 person assist required  -RB     Functional Mobility- Device  rollator  -RB     Functional Mobility- Safety Issues  balance decreased during turns;sequencing ability decreased;step length decreased;loses balance backward;supplemental O2  -RB     Functional Mobility- Comment  Min A x2 to complete funcitonal mobility to<>from sinkside.  -RB     Row Name 04/19/21 1537          Activities of Daily Living    BADL Assessment/Intervention  lower body dressing;grooming  -RB     Row Name 04/19/21 1537          Lower Body Dressing Assessment/Training    [place] Level (Lower Body Dressing)  lower body dressing skills;dependent (less than 25% patient effort)  -RB     Position (Lower Body Dressing)  edge of bed sitting;supported sitting  -RB     Row Name 04/19/21 1537          Grooming Assessment/Training    [place] Level (Grooming)  grooming skills;minimum assist (75% patient effort);verbal cues;nonverbal cues (demo/gesture)  -RB     Position (Grooming)  supported sitting  -RB     Comment (Grooming)  sitting on rollator in front of sinkside. Impaired visual scanning, coordination and sequencing  -RB       User Key  (r) = Recorded By, (t) = Taken By, (c) = Cosigned By    Initials Name Provider Type    RB Rhoda Stevens OT Occupational Therapist        Obj/Interventions     Row Name 04/19/21 1539          Sensory Assessment (Somatosensory)    Sensory Assessment (Somatosensory)  other (see comments) BLE  baseline sensation deficits  -[facility] Name 04/19/21 1539          Vision Assessment/Intervention    Visual Impairment/Limitations  visual/perceptual impairments present  -RB     [facility] Name 04/19/21 1539          Range of Motion Comprehensive    Comment, General Range of Motion  R hand with tone present from his prior stroke. Unable to complete opposition. Minimal functional use of R hand.  -[facility] Name 04/19/21 1539          Strength Comprehensive (MMT)    Comment, General Manual Muscle Testing (MMT) Assessment  R hand distal weakness from baseline stroke, 3/5  in R hand. LUE 4/5  -RB     [facility] Name 04/19/21 1539          Balance    Balance Assessment  sitting static balance;sitting dynamic balance;standing dynamic balance;standing static balance  -RB     Static Sitting Balance  WFL;sitting, edge of bed;supported  -RB     Dynamic Sitting Balance  mild impairment;sitting, edge of bed;supported  -RB     Static Standing Balance  mild impairment;supported;standing  -RB     Dynamic Standing Balance  moderate impairment;supported;standing  -RB     Balance Interventions  occupation based/functional task  -RB       User Key  (r) = Recorded By, (t) = Taken By, (c) = Cosigned By    Initials Name Provider Type    RB Rhoda Stevens OT Occupational Therapist        Goals/Plan     Row Name 04/19/21 1541          Bed Mobility Goal 1 (OT)    Activity/Assistive Device (Bed Mobility Goal 1, OT)  bed mobility activities, all  -RB     [place] Level/Cues Needed (Bed Mobility Goal 1, OT)  supervision required  -RB     Time Frame (Bed Mobility Goal 1, OT)  short term goal (STG);2 weeks  -RB     Progress/Outcomes (Bed Mobility Goal 1, OT)  continuing progress toward goal  -RB     [facility] Name 04/19/21 1541          Transfer Goal 1 (OT)    Activity/Assistive Device (Transfer Goal 1, OT)  transfers, all  -RB     [place] Level/Cues Needed (Transfer Goal 1, OT)  supervision required  -RB     Time Frame (Transfer Goal 1, OT)   short term goal (STG);2 weeks  -RB     Progress/Outcome (Transfer Goal 1, OT)  continuing progress toward goal  -RB     Row Name 04/19/21 1541          Bathing Goal 1 (OT)    Activity/Device (Bathing Goal 1, OT)  bathing skills, all  -RB     [place] Level/Cues Needed (Bathing Goal 1, OT)  minimum assist (75% or more patient effort)  -RB     Time Frame (Bathing Goal 1, OT)  short term goal (STG);2 weeks  -RB     Progress/Outcomes (Bathing Goal 1, OT)  continuing progress toward goal  -RB     Row Name 04/19/21 1541          Dressing Goal 1 (OT)    Activity/Device (Dressing Goal 1, OT)  dressing skills, all  -RB     [place]/Cues Needed (Dressing Goal 1, OT)  minimum assist (75% or more patient effort)  -RB     Time Frame (Dressing Goal 1, OT)  short term goal (STG);2 weeks  -RB     Progress/Outcome (Dressing Goal 1, OT)  continuing progress toward goal  -RB     Row Name 04/19/21 1541          Toileting Goal 1 (OT)    Activity/Device (Toileting Goal 1, OT)  toileting skills, all  -RB     [place] Level/Cues Needed (Toileting Goal 1, OT)  minimum assist (75% or more patient effort)  -RB     Time Frame (Toileting Goal 1, OT)  short term goal (STG);2 weeks  -RB     Progress/Outcome (Toileting Goal 1, OT)  continuing progress toward goal  -RB     Row Name 04/19/21 1541          Grooming Goal 1 (OT)    Activity/Device (Grooming Goal 1, OT)  grooming skills, all  -RB     [place] (Grooming Goal 1, OT)  set-up required  -RB     Time Frame (Grooming Goal 1, OT)  short term goal (STG);2 weeks  -RB     Progress/Outcome (Grooming Goal 1, OT)  continuing progress toward goal  -RB     Row Name 04/19/21 1541          Self-Feeding Goal 1 (OT)    Activity/Device (Self-Feeding Goal 1, OT)  self-feeding skills, all  -RB     [place] Level/Cues Needed (Self-Feeding Goal 1, OT)  set-up required  -RB     Time Frame (Self-Feeding Goal 1, OT)  short term goal (STG);2 weeks  -RB     Progress/Outcomes (Self-Feeding Goal 1,  OT)  continuing progress toward goal  -RB     Row Name 04/19/21 1541          Therapy Assessment/Plan (OT)    Planned Therapy Interventions (OT)  activity tolerance training;adaptive equipment training;BADL retraining;cognitive/visual perception retraining;functional balance retraining;neuromuscular control/coordination retraining;occupation/activity based interventions;patient/caregiver education/training;ROM/therapeutic exercise;strengthening exercise;transfer/mobility retraining  -RB       User Key  (r) = Recorded By, (t) = Taken By, (c) = Cosigned By    Initials Name Provider Type    RB Rhoda Stevens, SANDY Occupational Therapist        Clinical Impression     Row Name 04/19/21 1541          Pain Assessment    Additional Documentation  Pain Scale: Numbers Pre/Post-Treatment (Group)  -RB     Row Name 04/19/21 1541          Pain Scale: Numbers Pre/Post-Treatment    Pretreatment Pain Rating  0/10 - no pain  -RB     Posttreatment Pain Rating  0/10 - no pain  -RB     Row Name 04/19/21 1541          Plan of Care Review    Progress  no change  -RB     Row Name 04/19/21 1541          Therapy Assessment/Plan (OT)    Rehab Potential (OT)  good, to achieve stated therapy goals  -RB     Criteria for Skilled Therapeutic Interventions Met (OT)  yes;skilled treatment is necessary  -RB     Therapy Frequency (OT)  5 times/wk  -RB     Row Name 04/19/21 1541          Therapy Plan Review/Discharge Plan (OT)    Anticipated Discharge Disposition (OT)  skilled nursing facility  -RB     Row Name 04/19/21 1541          Vital Signs    Pre SpO2 (%)  92  -RB     O2 Delivery Pre Treatment  nasal cannula  -RB     Intra SpO2 (%)  92  -RB     O2 Delivery Intra Treatment  nasal cannula  -RB     Post SpO2 (%)  93  -RB     O2 Delivery Post Treatment  nasal cannula  -RB     Pre Patient Position  Supine  -RB     Intra Patient Position  Standing  -RB     Post Patient Position  Supine  -RB     Row Name 04/19/21 1541          Positioning and  Restraints    Pre-Treatment Position  in bed  -RB     Post Treatment Position  bed  -RB     In Bed  notified nsg;supine;fowlers;call light within reach;encouraged to call for assist;exit alarm on  -RB       User Key  (r) = Recorded By, (t) = Taken By, (c) = Cosigned By    Initials Name Provider Type    Rhoda Garcia OT Occupational Therapist        Outcome Measures     Row Name 04/19/21 1543          How much help from another is currently needed...    Putting on and taking off regular lower body clothing?  1  -RB     Bathing (including washing, rinsing, and drying)  2  -RB     Toileting (which includes using toilet bed pan or urinal)  1  -RB     Putting on and taking off regular upper body clothing  2  -RB     Taking care of personal grooming (such as brushing teeth)  3  -RB     Eating meals  3  -RB     AM-PAC 6 Clicks Score (OT)  12  -RB     Row Name 04/19/21 1543          Modified [place] Scale    Modified [place] Scale  4 - Moderately severe disability.  Unable to walk without assistance, and unable to attend to own bodily needs without assistance.  -RB     Row Name 04/19/21 1543          Functional Assessment    Outcome Measure Options  AM-PAC 6 Clicks Daily Activity (OT);Modified [place]  -RB       User Key  (r) = Recorded By, (t) = Taken By, (c) = Cosigned By    Initials Name Provider Type    Rhoda Garcia OT Occupational Therapist        Occupational Therapy Education                 Title: PT OT SLP Therapies (In Progress)     Topic: Occupational Therapy (Done)     Point: ADL training (Done)     Description:   Instruct learner(s) on proper safety adaptation and remediation techniques during self care or transfers.   Instruct in proper use of assistive devices.              Learning Progress Summary           Patient Acceptance, E,TB,D, VU,DU,NR by RB at 4/19/2021 1543    Comment: safety awareness, falls risk, d/c planning                   Point: Home exercise program (Done)     Description:    Instruct learner(s) on appropriate technique for monitoring, assisting and/or progressing therapeutic exercises/activities.              Learning Progress Summary           Patient Acceptance, E,TB,D, VU,DU,NR by RB at 4/19/2021 1543    Comment: safety awareness, falls risk, d/c planning                   Point: Precautions (Done)     Description:   Instruct learner(s) on prescribed precautions during self-care and functional transfers.              Learning Progress Summary           Patient Acceptance, E,TB,D, VU,DU,NR by RB at 4/19/2021 1543    Comment: safety awareness, falls risk, d/c planning                   Point: Body mechanics (Done)     Description:   Instruct learner(s) on proper positioning and spine alignment during self-care, functional mobility activities and/or exercises.              Learning Progress Summary           Patient Acceptance, E,TB,D, VU,DU,NR by RB at 4/19/2021 1543    Comment: safety awareness, falls risk, d/c planning                               User Key     Initials Effective Dates Name Provider Type Discipline     04/02/20 -  Rhoda Stevens OT Occupational Therapist OT              OT Recommendation and Plan  Planned Therapy Interventions (OT): activity tolerance training, adaptive equipment training, BADL retraining, cognitive/visual perception retraining, functional balance retraining, neuromuscular control/coordination retraining, occupation/activity based interventions, patient/caregiver education/training, ROM/therapeutic exercise, strengthening exercise, transfer/mobility retraining  Therapy Frequency (OT): 5 times/wk  Plan of Care Review  Progress: no change     Time Calculation:   Time Calculation- OT     Row Name 04/19/21 1535             Time Calculation- OT    OT Start Time  1444  -RB      OT Stop Time  1524  -      OT Time Calculation (min)  40 min  -RB      Total Timed Code Minutes- OT  30 minute(s)  -RB      OT Received On  04/19/21  -      OT - Next  Appointment  04/20/21  -LYNNE      OT Goal Re-Cert Due Date  05/03/21  -LYNNE        User Key  (r) = Recorded By, (t) = Taken By, (c) = Cosigned By    Initials Name Provider Type    Rhoda Garcia OT Occupational Therapist        Therapy Charges for Today     Code Description Service Date Service Provider Modifiers Qty    [id number]  OT EVAL MOD COMPLEXITY 2 4/19/2021 Rhoda Stevens OT GO 1    [id number]  OT SELF CARE/MGMT/TRAIN EA 15 MIN 4/19/2021 Rhoda Stevens OT GO 2               Rhoda Stevens OT  4/19/2021

## 2021-04-19 NOTE — PROGRESS NOTES
"   LOS: 5 days   Patient Care Team:  Dakota Avila MD as PCP - General (Family Medicine)  Morris Cai MD as Consulting Physician (Sleep Medicine)  Michaela Hylton MD as Consulting Physician (Cardiology)  Hardy Banerjee MD as Consulting Physician (Ophthalmology)  Chula Christianson MD as Consulting Physician (Ophthalmology)  Jason Sherman MD (Endocrinology)  Perla Mayen MD as Consulting Physician (Nephrology)  Federico Hebert MD as Consulting Physician (Pulmonary Disease)  Johan Dillard, LUC as Ambulatory  (Population Health)  Niraj Ravi MD as Consulting Physician (Orthopedic Surgery)  Papa Velasco MD as Consulting Physician (Urology)    Chief Complaint: volume overload     Interval History:   No CP, no SOB    Objective   Vital Signs  Temp:  [96.8 °F (36 °C)-97.4 °F (36.3 °C)] 97.1 °F (36.2 °C)  Heart Rate:  [60-63] 63  Resp:  [16-18] 18  BP: (107-127)/(45-63) 107/56    Intake/Output Summary (Last 24 hours) at 4/19/2021 0803  Last data filed at 4/19/2021 0425  Gross per 24 hour   Intake 600 ml   Output 3350 ml   Net -2750 ml       Last Weight and Admission Weight        04/19/21  0436   Weight: 83.2 kg (183 lb 6.8 oz)     Flowsheet Rows      First Filed Value   Admission Height  175.3 cm (69\") Documented at 04/12/2021 1335   Admission Weight  105 kg (231 lb 7.7 oz) Documented at 04/12/2021 1335        Physical Exam  Constitutional:       General: He is not in acute distress.  HENT:      Nose: Nose normal.      Mouth/Throat:      Pharynx: Oropharynx is clear.   Eyes:      Conjunctiva/sclera: Conjunctivae normal.   Cardiovascular:      Rate and Rhythm: Normal rate.      Heart sounds: Murmur heard.   Systolic murmur is present with a grade of 1/6.        Comments: 1+ BLE to thighs; numerous skin tears/scabs, continues to improve  Pulmonary:      Effort: Pulmonary effort is normal.      Breath sounds: Examination of the right-lower field reveals decreased " DISCHARGE SUMMARY from Nurse    PATIENT INSTRUCTIONS:    What to do at Home:    Recommended activity: Activity as tolerated,       *  Please give a list of your current medications to your Primary Care Provider. *  Please update this list whenever your medications are discontinued, doses are      changed, or new medications (including over-the-counter products) are added. *  Please carry medication information at all times in case of emergency situations. These are general instructions for a healthy lifestyle:    No smoking/ No tobacco products/ Avoid exposure to second hand smoke  Surgeon General's Warning:  Quitting smoking now greatly reduces serious risk to your health. Obesity, smoking, and sedentary lifestyle greatly increases your risk for illness    A healthy diet, regular physical exercise & weight monitoring are important for maintaining a healthy lifestyle    You may be retaining fluid if you have a history of heart failure or if you experience any of the following symptoms:  Weight gain of 3 pounds or more overnight or 5 pounds in a week, increased swelling in our hands or feet or shortness of breath while lying flat in bed. Please call your doctor as soon as you notice any of these symptoms; do not wait until your next office visit. Recognize signs and symptoms of STROKE:    F-face looks uneven    A-arms unable to move or move unevenly    S-speech slurred or non-existent    T-time-call 911 as soon as signs and symptoms begin-DO NOT go       Back to bed or wait to see if you get better-TIME IS BRAIN. Warning Signs of HEART ATTACK     Call 911 if you have these symptoms:   Chest discomfort. Most heart attacks involve discomfort in the center of the chest that lasts more than a few minutes, or that goes away and comes back. It can feel like uncomfortable pressure, squeezing, fullness, or pain.  Discomfort in other areas of the upper body.  Symptoms can include pain or discomfort in breath sounds. Decreased breath sounds present.   Abdominal:      Palpations: Abdomen is soft.   Musculoskeletal:      Right lower leg: Edema present.      Left lower leg: Edema present.   Skin:     General: Skin is warm.      Coloration: Skin is pale.   Neurological:      Mental Status: Mental status is at baseline.   Psychiatric:      Comments: Odd affect, anxious       Results Review:      Results from last 7 days   Lab Units 04/18/21  0732 04/17/21  0722 04/16/21  0511   SODIUM mmol/L 136 136 138   POTASSIUM mmol/L 3.5 3.9 3.4*   CHLORIDE mmol/L 89* 89* 93*   CO2 mmol/L 38.0* 37.5* 36.2*   BUN mg/dL 36* 32* 33*   CREATININE mg/dL 1.32* 1.44* 1.27   GLUCOSE mg/dL 338* 257* 207*   CALCIUM mg/dL 8.8 9.4 9.1         Results from last 7 days   Lab Units 04/14/21  0328 04/12/21  1452   WBC 10*3/mm3 6.12 6.44   HEMOGLOBIN g/dL 12.5* 12.9*   HEMATOCRIT % 37.6 38.5   PLATELETS 10*3/mm3 192 199             Results from last 7 days   Lab Units 04/16/21  0511   MAGNESIUM mg/dL 1.9         I reviewed the patient's new clinical results.    I personally viewed and interpreted the patient's EKG/Telemetry data        Medication Review:   aspirin, 81 mg, Oral, Daily  bisacodyl, 10 mg, Rectal, Daily  bumetanide, 2 mg, Oral, BID  carvedilol, 12.5 mg, Oral, BID With Meals  enoxaparin, 40 mg, Subcutaneous, Q24H  insulin glargine, 36 Units, Subcutaneous, QAM  insulin lispro, 0-14 Units, Subcutaneous, TID AC  insulin lispro, 12 Units, Subcutaneous, TID With Meals  polyethylene glycol, 17 g, Oral, Daily  sodium chloride, 10 mL, Intravenous, Q12H  spironolactone, 25 mg, Oral, Daily  tamsulosin, 0.4 mg, Oral, BID      Assessment/Plan     Active Hospital Problems    Diagnosis  POA   • Bacteriuria [R82.71]  Unknown   • Rectal pain [K62.89]  Unknown   • Acute on chronic diastolic (congestive) heart failure (CMS/HCC) [I50.33]  Yes   • Acute on chronic renal failure (CMS/HCC) [N17.9, N18.9]  Unknown   • Urinary retention [R33.9]  Yes   • HTN  one or both arms, the back, neck, jaw, or stomach.  Shortness of breath with or without chest discomfort.  Other signs may include breaking out in a cold sweat, nausea, or lightheadedness. Don't wait more than five minutes to call 911 - MINUTES MATTER! Fast action can save your life. Calling 911 is almost always the fastest way to get lifesaving treatment. Emergency Medical Services staff can begin treatment when they arrive -- up to an hour sooner than if someone gets to the hospital by car. The discharge information has been reviewed with the patient. The patient verbalized understanding. Discharge medications reviewed with the patient and appropriate educational materials and side effects teaching were provided. If I feel that I can not keep these promises and I am at risk of hurting myself or others,I will call the crisis office and speak with a crisis worker who will assist me during my crisis.     [address] [place] [address] [phone]  ___________________________________________________________________________________________________________________________________ (hypertension) [I10]  Yes   • CSA (central sleep apnea) [G47.31]  Yes   • Acute on chronic diastolic CHF (congestive heart failure) (CMS/[facility]) [I50.33]  Unknown   • CAD (coronary artery disease) [I25.10]  Yes   • Type 2 diabetes mellitus with hyperglycemia, with long-term current use of insulin (CMS/[facility]) [E11.65, Z79.4]  Not Applicable      Resolved Hospital Problems   No resolved problems to display.       1. Acute on chronic diastolic CHF- He is down almost 50 pounds this admission. Wt reported unchanged overnight but good UOP.  Nephrology is dosing his diuretics; they switched him to oral bumetanide and added spironolactone. Weaning O2.  2. Chronic right sided CHF/severe PHTN, RVSP 70mm Hg  3. Longstanding severe essential hypertension-continue current medical therapy, well controlled  4. CAD s/p CABG  5. AMENA on CKD, stabilizing  6. Combined COCO/CSA  7. IDDM2- per [facility], BS increasing, DM/insulin per [facility]  8. Urinary retention, history of UTI-ID is following, Abnormal urine culture with ESBL Klebsiella is likely colonization, no Abx per ID  9. History of orthopedic fractures/non-weight bearing status- per Dr Candelaria he is allowed to WBAT but it's unlikely that he'll tolerate it due to pain/deconditioning, ortho has ordered PT    Per Dr. Sanchez It seems unlikely that he will be able to return home at discharge and will likely need SNF, CCP following, patient wants to go home once cleared by all consultants    Bhargav Black III, MD  04/19/21  08:03 EDT

## 2021-04-19 NOTE — PLAN OF CARE
Pt admitted to [facility] 2/2 to CHF, contact isolation for ESBL, Klebsiella. Multiple fx's in LLE's (WBAT LLE, possible ortho surgery for non healing hip fx at a later date). Pmhx includes a stroke with R residual deficits. Pt lives with his wife in a single level home where he required assist for ADL's and transfers. Pt uses a chair lift, rollator and shower bench. Today, pt oriented x3 yet appeared confused at times. Min A to transfer to EOB. Total A LB dressing. Min A x2 to transfer to standing at sinkside. Min A for grooming task while sitting on his rollator. Mod A x2 to stand off rollator due to pt's posterior lean. Min A x2 to transition back to EOB->supine. OT recommends continued skilled inpatient OT services with a d/c to SNF for further therapy to increase his strength and safety during functional tasks.    Patient was wearing a face mask during this therapy encounter. Therapist used appropriate personal protective equipment including mask, goggles and gloves. Mask used was standard procedure mask. Appropriate PPE was worn during the entire therapy session. Hand hygiene was completed before and after therapy session. Patient is not in enhanced droplet precautions.

## 2021-04-19 NOTE — PLAN OF CARE
Problem: Adult Inpatient Plan of Care  Goal: Absence of Hospital-Acquired Illness or Injury  Intervention: Identify and Manage Fall Risk  Recent Flowsheet Documentation  Taken 4/19/2021 0425 by Day Schneider, LUC  Safety Promotion/Fall Prevention:   activity supervised   assistive device/personal items within reach   clutter free environment maintained   fall prevention program maintained   lighting adjusted   nonskid shoes/slippers when out of bed   room organization consistent   safety round/check completed  Taken 4/19/2021 0215 by Day Schneider, RN  Safety Promotion/Fall Prevention:   activity supervised   assistive device/personal items within reach   clutter free environment maintained   fall prevention program maintained   nonskid shoes/slippers when out of bed   room organization consistent   safety round/check completed  Taken 4/18/2021 2200 by Day Schneider, LUC  Safety Promotion/Fall Prevention:   activity supervised   assistive device/personal items within reach   clutter free environment maintained   fall prevention program maintained   lighting adjusted   nonskid shoes/slippers when out of bed   room organization consistent   safety round/check completed  Taken 4/18/2021 2000 by Day Schneider, RN  Safety Promotion/Fall Prevention:   activity supervised   assistive device/personal items within reach   clutter free environment maintained   fall prevention program maintained   nonskid shoes/slippers when out of bed   room organization consistent   safety round/check completed   Goal Outcome Evaluation:  Plan of Care Reviewed With: patient, spouse     Outcome Summary: A/Ox3, c/o not being able to sleep this shift, however pt appeared asleep during safety rounds and spouse was in recliner asleep too. Spoke with pt & spouse r/t diet restrictions and to use more energy which will give more strength. Also, reminded family that consuming carbohydrates will result in higher blood sugars. Wife, brought in packages  of crackers and stated pt needed a higher blood sugar to feel better. Will continue to educate and monitor.

## 2021-04-19 NOTE — PROGRESS NOTES
PROGRESS NOTE      Name: Dilip Verma ADMIT: 2021   : 1950  PCP: Dakota Avila MD    MRN: [id number] LOS: 5 days   AGE/SEX: 71 y.o. male  ROOM: [address] .    Patient seen and examined.  Renal function stable.       INTERVAL History      Patient is diuresing well      REVIEW OF SYSTEMS  Constitutional: + weakness.  HEENT:           No headache, otalgia, itchy eyes, nasal discharge or sore throat.  Cardiac:           No chest pain, orthopnea or PND. +dyspnea  Chest:              No cough, phlegm or wheezing.  Abdomen:        No abdominal pain, nausea or vomiting.  Neuro:             No focal weakness, abnormal movements or seizure-like activity.  :                  History of difficulty urinating  Ext:                  + swelling (improving)  ROS was otherwise negative except as mentioned in the [place].       Medications Prior to Admission   Medication Sig Dispense Refill Last Dose   • aspirin 81 MG EC tablet Take 81 mg by mouth Daily.   2021 at Unknown time   • carvedilol (COREG) 12.5 MG tablet Take one tablet twice daily with meals 180 tablet 2 2021 at Unknown time   • Cholecalciferol (Vitamin D) 125 MCG (5000 UT) capsule capsule Take 5,000 Units by mouth Daily.   2021 at Unknown time   • furosemide (LASIX) 40 MG tablet Take 1.5 tablets by mouth 2 (Two) Times a Day.   2021 at Unknown time   • hydrALAZINE (APRESOLINE) 25 MG tablet Take 1 tablet by mouth 2 (Two) Times a Day As Needed (for SBP > 160). 90 tablet 0 2021 at Unknown time   • Insulin Glargine, 2 Unit Dial, (TOUJEO) 300 UNIT/ML solution pen-injector injection Inject 28 Units under the skin into the appropriate area as directed Daily.   2021 at Unknown time   • insulin lispro (humaLOG) 100 UNIT/ML injection Inject 0-14 Units under the skin into the appropriate area as directed 3 (Three) Times a Day Before Meals.  12 2021 at Unknown time   • melatonin 3 MG tablet Take 9 mg by mouth Every  "Night.   Patient Taking Differently at Unknown time   • Oxymetazoline HCl (AFRIN NASAL SPRAY NA) into the nostril(s) as directed by provider.   4/11/2021 at Unknown time   • tamsulosin (FLOMAX) 0.4 MG capsule 24 hr capsule TAKE 2 CAPSULES BY MOUTH AT BEDTIME  (Patient taking differently: Take 1 capsule by mouth 2 (Two) Times a Day.) 60 capsule 0 Patient Taking Differently at Unknown time   • sennosides-docusate (senna-docusate sodium) 8.6-50 MG per tablet Take 2 tablets by mouth 2 (two) times a day. 56 tablet 0 Unknown at Unknown time     Allergies:  Ace inhibitors, Clonidine derivatives, Losartan, Amlodipine, Ativan [lorazepam], Xanax [alprazolam], Minoxidil, and Tetracycline     Objective   PHYSICAL EXAM  Trace lower extremity edema  /62   Pulse 63   Temp 97.1 °F (36.2 °C) (Oral)   Resp 18   Ht 175.3 cm (69\")   Wt 83.2 kg (183 lb 6.8 oz)   SpO2 96%   BMI 27.09 kg/m²   Intake/Output last 3 shifts:  I/O last 3 completed shifts:  In: 600 [P.O.:600]  Out: 5750 [Urine:5750]  Intake/Output this shift:  I/O this shift:  In: 240 [P.O.:240]  Out: 450 [Urine:450]         LABS:    CBC    Results from last 7 days   Lab Units 04/14/21  0328 04/12/21  1452   WBC 10*3/mm3 6.12 6.44   HEMOGLOBIN g/dL 12.5* 12.9*   PLATELETS 10*3/mm3 192 199     BMP   Results from last 7 days   Lab Units 04/19/21  0654 04/18/21  0732 04/17/21  0722 04/16/21  0511 04/15/21  0859 04/14/21  0328 04/13/21  0930   SODIUM mmol/L 136 136 136 138 140 140 142   POTASSIUM mmol/L 3.5 3.5 3.9 3.4* 3.7 3.3* 3.6   CHLORIDE mmol/L 89* 89* 89* 93* 96* 97* 101   CO2 mmol/L 37.9* 38.0* 37.5* 36.2* 32.9* 31.7* 33.1*   BUN mg/dL 39* 36* 32* 33* 31* 29* 29*   CREATININE mg/dL 1.58* 1.32* 1.44* 1.27 1.43* 1.31* 1.41*   GLUCOSE mg/dL 231* 338* 257* 207* 257* 181* 221*   MAGNESIUM mg/dL  --   --   --  1.9  --   --   --    PHOSPHORUS mg/dL  --   --   --   --   --  3.7 3.2     CMP   Results from last 7 days   Lab Units 04/19/21  0654 04/18/21  0732 " 04/17/21  0722 04/16/21  0511 04/15/21  0859 04/14/21  0328 04/13/21  0930 04/12/21  1521   SODIUM mmol/L 136 136 136 138 140 140 142 140   POTASSIUM mmol/L 3.5 3.5 3.9 3.4* 3.7 3.3* 3.6 3.7   CHLORIDE mmol/L 89* 89* 89* 93* 96* 97* 101 101   CO2 mmol/L 37.9* 38.0* 37.5* 36.2* 32.9* 31.7* 33.1* 27.7   BUN mg/dL 39* 36* 32* 33* 31* 29* 29* 31*   CREATININE mg/dL 1.58* 1.32* 1.44* 1.27 1.43* 1.31* 1.41* 1.51*   GLUCOSE mg/dL 231* 338* 257* 207* 257* 181* 221* 177*   ALBUMIN g/dL  --   --   --   --   --  3.30* 3.30* 3.30*   BILIRUBIN mg/dL  --   --   --   --   --   --   --  2.5*   ALK PHOS U/L  --   --   --   --   --   --   --  325*   AST (SGOT) U/L  --   --   --   --   --   --   --  29   ALT (SGPT) U/L  --   --   --   --   --   --   --  23     ABG      Imaging Results (Last 24 Hours)     ** No results found for the last 24 hours. **          Results for orders placed during the hospital encounter of 04/02/21    Adult Transthoracic Echo Complete w/ Color, Spectral and Contrast if Necessary Per Protocol    Interpretation Summary  · Left ventricular ejection fraction appears to be 51 - 55%. Left ventricular systolic function is low normal. Normal left ventricular cavity size noted. Left ventricular wall thickness is consistent with moderate concentric hypertrophy. All left ventricular wall segments contract normally. Left ventricular diastolic function is consistent with (grade II w/high LAP) pseudonormalization.  · Calculated right ventricular systolic pressure from tricuspid regurgitation is 70.1 mmHg.      Assessment/Plan   Assessment:        Type 2 diabetes mellitus with hyperglycemia, with long-term current use of insulin (CMS/[facility])    CAD (coronary artery disease)    Acute on chronic diastolic CHF (congestive heart failure) (CMS/HCC)    CSA (central sleep apnea)    HTN (hypertension)    Urinary retention    Acute on chronic renal failure (CMS/HCC)    Acute on chronic diastolic (congestive) heart failure (CMS/HCC)     Bacteriuria    Rectal pain    · Acute kidney injury, with CKD, admission creatinine 1.51 on 4/12, creatinine 4/9 1.72, cr 1.12 on 3/19 Urine studies not available. AMENA possible pre-renal with CHF, fluid overload on exam, recent UTIs, less likely obstruction with light catheter in place draining urine.   · Chronic kidney disease, stage III, baseline creatinine noted around 1.3 on 08/2020. CKD most likely secondary to atherosclerotic disease.   · Acid/base stable  · Electrolytes  · Volume, overload on exam   · Echocardiogram  · Left ventricular ejection fraction appears to be 51 - 55%. Left ventricular systolic function is low normal. Normal left ventricular cavity size noted. Left ventricular wall thickness is consistent with moderate concentric hypertrophy. All left ventricular wall segments contract normally. Left ventricular diastolic function is consistent with (grade II w/high LAP) pseudonormalization.  · Right ventricular systolic pressure from tricuspid regurgitation is 70.1 mmHg  · BP, hypertensive - expect some improvement with aggressive diuresis   · Anemia  · Acute on chronic diastolic CHF  · GNB UTI  · Chronic right sided CHF, severe PHTN, RVSP 70mmHg  · CAD s/p CABG  · COCO,CSA on BiPap  · IDDM2  · Urinary retention, history of UTI  · History of orthopedic fractures, non-weight bearing status     Plan:      ? Renal function is stable.  Back to baseline.   ? Urine analysis grossly abnormal with hematuria and pyuria TNTC, lots of hyaline casts, 4+ bacteria, 3+ leukocyte esterase.   ? Urine protein/creatinine ratio 462mg/G  ? Volume, better  ? Tolerating po bumex 2 bid well  ? continue aldactone 25 mg po daily  ? Urology input appreciated  ? I had again extensive d/w pt and wife about intermittent self cath, pt still not ready  ? UOP fair  ? Strict I/O  ? Daily weights  ? Avoid nephrotoxic medications  ? Patient with indwelling light catheter  ? Discussed with patient and spouse at bedside.   ? Cardiology  plan noted.  ? Okay to discharge from renal standpoint  ? Will need outpatient follow up with our office in 2-3 weeks, office to call and set up appointment.   ? Discharge on Bumex 2mg PO BID.   ? Thank you for allowing me to participate in Mr. Verma' care.         [facility]  4/19/2021  11:54 EDT       The note was transcribed by LEIDY Garland.  I personally have examined the patient and interviewed the patient and modified the history, exam. I have reviewed the history, data, problems, assessment and plan .and I concur . Exam as described in the Progress note, with comments additions, revisions as noted by me.             Cristóbal Mayen MD  4/19/2021  [facility]

## 2021-04-19 NOTE — THERAPY TREATMENT NOTE
"Patient Name: Dilip Verma  : 1950    MRN: [id number]                              Today's Date: 2021       Admit Date: 2021    Visit Dx: No diagnosis found.  Patient Active Problem List   Diagnosis   • Anxiety   • Colon polyp   • Type 2 diabetes mellitus with hyperglycemia, with long-term current use of insulin (CMS/[facility])   • Erectile dysfunction   • Hyperlipidemia   • CAD (coronary artery disease)   • Hx of CABG   • Acute on chronic diastolic CHF (congestive heart failure) (CMS/[facility])   • Hypersomnia due to medical condition   • CSA (central sleep apnea)   • Periodic breathing   • HTN (hypertension)   • History of stroke   • CKD (chronic kidney disease) stage 3, GFR 30-59 ml/min (CMS/[facility])   • Urinary retention   • Acute on chronic renal failure (CMS/[facility])   • Acute on chronic diastolic (congestive) heart failure (CMS/[facility])   • Bacteriuria   • Rectal pain     Past Medical History:   Diagnosis Date   • AMENA (acute kidney injury) (CMS/[facility]) 12/3/2020   • Anxiety    • Chronic diastolic (congestive) heart failure (CMS/[facility])    • Chronic kidney disease     stones- had lithotripsy   • CKD (chronic kidney disease) stage 3, GFR 30-59 ml/min (CMS/[facility]) 2020   • Closed nondisplaced intertrochanteric fracture of left femur (CMS/HCC) 2020   • Colon polyp    • Coronary artery disease     CABG 2019   • Diabetes mellitus, type II (CMS/[facility])    • Erectile dysfunction    • H/O bone density study never   • Hyperlipidemia    • Hypersomnia    • Hypertension    • Kidney stone    • Orthostasis    • Periodic breathing    • Routine eye exam    • Sleep apnea     bipap   • Stroke (CMS/[facility])     \"slight stroke\"     Past Surgical History:   Procedure Laterality Date   • CARDIAC CATHETERIZATION N/A 7/15/2019    Procedure: Coronary angiography;  Surgeon: Carrie Price MD;  Location: [facility] INVASIVE LOCATION;  Service: Cardiovascular   • CARDIAC CATHETERIZATION N/A 7/15/2019    Procedure: Left Heart Cath;  " Surgeon: Carrie Price MD;  Location: [facility] CATH INVASIVE LOCATION;  Service: Cardiovascular   • CARDIAC CATHETERIZATION N/A 7/15/2019    Procedure: Left ventriculography;  Surgeon: Carrie Price MD;  Location: [facility] CATH INVASIVE LOCATION;  Service: Cardiovascular   • CARDIAC CATHETERIZATION  7/15/2019    Procedure: Functional Flow [place];  Surgeon: Carrie Price MD;  Location: [facility] CATH INVASIVE LOCATION;  Service: Cardiovascular   • CARDIAC CATHETERIZATION N/A 11/6/2019    Procedure: Right and Left Heart Cath;  Surgeon: Mya Smith MD;  Location: [facility] CATH INVASIVE LOCATION;  Service: Cardiovascular   • CARDIAC CATHETERIZATION N/A 11/6/2019    Procedure: Coronary angiography;  Surgeon: Mya Smith MD;  Location: [facility] CATH INVASIVE LOCATION;  Service: Cardiovascular   • CATARACT EXTRACTION EXTRACAPSULAR W/ INTRAOCULAR LENS IMPLANTATION Bilateral    • COLONOSCOPY  01/2015    dr jimenez   • CORONARY ARTERY BYPASS GRAFT N/A 7/18/2019    Procedure: INTRAOPERATIVE SHOAIB; STERNOTOMY CORONARY ARTERY BYPASS x 3  USING LEFT INTERNAL MAMMARY ARTERY GRAFT UTILIZING ENDOSCOPICALLY HARVESTED RIGHT GREATER SAPHENOUS VEIN AND PRP.;  Surgeon: Bill Devi MD;  Location: [facility] OR;  Service: Cardiothoracic   • DENTAL PROCEDURE      3 surgeries inder implants   • FEMUR IM NAILING/RODDING Left 12/3/2020    Procedure: LEFT HIP INTRAMEDULLARY NAIL;  Surgeon: Niraj Ravi MD;  Location: [facility] MAIN OR;  Service: Orthopedic Spine;  Laterality: Left;   • HERNIA REPAIR      inguinal bilaterally   • KIDNEY STONE SURGERY      lithotripsy     General Information     Row Name 04/19/21 7696          Physical Therapy Time and Intention    Document Type  therapy note (daily note)  -MD     Mode of Treatment  physical therapy  -MD     Row Name 04/19/21 3914          General Information    Patient Profile Reviewed  yes  -MD     Existing Precautions/Restrictions  fall  -MD     Row Name 04/19/21 7807          Home Main  Entrance    Number of Stairs, Main Entrance  -- ramp to enter  -MD     Row Name 04/19/21 0850          Cognition    Orientation Status (Cognition)  oriented to;person  -MD       User Key  (r) = Recorded By, (t) = Taken By, (c) = Cosigned By    Initials Name Provider Type    Venus Plummer, PT Physical Therapist        Mobility     Row Name 04/19/21 0923          Bed Mobility    Bed Mobility  supine-sit;sit-supine  -MD     Supine-Sit [place] (Bed Mobility)  standby assist  -MD     Sit-Supine [place] (Bed Mobility)  verbal cues;minimum assist (75% patient effort)  -MD     Assistive Device (Bed Mobility)  bed rails;head of bed elevated  -MD     Row Name 04/19/21 0923          Sit-Stand Transfer    Sit-Stand [place] (Transfers)  verbal cues;minimum assist (75% patient effort);moderate assist (50% patient effort);2 person assist  -MD     Assistive Device (Sit-Stand Transfers)  walker, 4-wheeled  -MD     Row Name 04/19/21 0923          Gait/Stairs (Locomotion)    [place] Level (Gait)  verbal cues;minimum assist (75% patient effort);moderate assist (50% patient effort);2 person assist  -MD     Assistive Device (Gait)  walker, 4-wheeled  -MD     Distance in Feet (Gait)  20'  -MD     Deviations/Abnormal Patterns (Gait)  antalgic;base of support, narrow;festinating/shuffling;gait speed decreased  -MD     Bilateral Gait Deviations  forward flexed posture  -MD       User Key  (r) = Recorded By, (t) = Taken By, (c) = Cosigned By    Initials Name Provider Type    Venus Plummer, PT Physical Therapist        Obj/Interventions    No documentation.       Goals/Plan    No documentation.       Clinical Impression     Row Name 04/19/21 0924          Pain    Additional Documentation  Pain Scale: FACES Pre/Post-Treatment (Group)  -MD     Row Name 04/19/21 0924          Pain Scale: Numbers Pre/Post-Treatment    Pain Intervention(s)  Ambulation/increased activity;Repositioned  -MD     Row Name 04/19/21 0924          Pain  Scale: FACES Pre/Post-Treatment    Pain: FACES Scale, Pretreatment  2-->hurts little bit  -MD     Row Name 04/19/21 0924          Plan of Care Review    Outcome Summary  Pt progressing w ambulation this am as demonstrated by her increase in ambulation distance.  Pt continues to be WBAT on L LE and presents w and antalgic gait pattern compromising her safety w functional mobility.  -MD     Row Name 04/19/21 0924          Positioning and Restraints    Pre-Treatment Position  in bed  -MD     Post Treatment Position  bed  -MD     In Bed  supine;call light within reach;exit alarm on  -MD       User Key  (r) = Recorded By, (t) = Taken By, (c) = Cosigned By    Initials Name Provider Type    Venus Plummer, PT Physical Therapist        Outcome Measures     Row Name 04/19/21 0927          How much help from another person do you currently need...    Turning from your back to your side while in flat bed without using bedrails?  3  -MD     Moving from lying on back to sitting on the side of a flat bed without bedrails?  3  -MD     Moving to and from a bed to a chair (including a wheelchair)?  2  -MD     Standing up from a chair using your arms (e.g., wheelchair, bedside chair)?  2  -MD     Climbing 3-5 steps with a railing?  1  -MD     To walk in hospital room?  2  -MD     AM-PAC 6 Clicks Score (PT)  13  -MD     Row Name 04/19/21 0927          Functional Assessment    Outcome Measure Options  AM-PAC 6 Clicks Basic Mobility (PT)  -MD       User Key  (r) = Recorded By, (t) = Taken By, (c) = Cosigned By    Initials Name Provider Type    Venus Plummer, PT Physical Therapist        Physical Therapy Education                 Title: PT OT SLP Therapies (In Progress)     Topic: Physical Therapy (In Progress)     Point: Mobility training (In Progress)     Learning Progress Summary           Patient Acceptance, E,D, NR by KATHY at 4/18/2021 1702                   Point: Home exercise program (In Progress)     Learning Progress Summary            Patient Acceptance, E,D, NR by KATHY at 4/18/2021 1702                   Point: Body mechanics (In Progress)     Learning Progress Summary           Patient Acceptance, E,D, NR by KATHY at 4/18/2021 1702                   Point: Precautions (In Progress)     Learning Progress Summary           Patient Acceptance, E, NR by MD at 4/19/2021 0928    Acceptance, E,D, NR by KATHY at 4/18/2021 1702                               User Key     Initials Effective Dates Name Provider Type Discipline     02/05/19 -  Abi Hernandez, PT Physical Therapist PT    MD 04/03/18 -  Venus Blake PT Physical Therapist PT              PT Recommendation and Plan     Outcome Summary: Pt progressing w ambulation this am as demonstrated by her increase in ambulation distance.  Pt continues to be WBAT on L LE and presents w and antalgic gait pattern compromising her safety w functional mobility.     Time Calculation:   PT Charges     Row Name 04/19/21 0850             Time Calculation    Start Time  0850  -MD      Stop Time  0908  -MD      Time Calculation (min)  18 min  -MD      PT Received On  04/19/21  -MD      PT - Next Appointment  04/20/21  -MD        User Key  (r) = Recorded By, (t) = Taken By, (c) = Cosigned By    Initials Name Provider Type    Venus Plummer PT Physical Therapist        Therapy Charges for Today     Code Description Service Date Service Provider Modifiers Qty    80909401636 HC PT THER PROC EA 15 MIN 4/19/2021 Venus Blake, PT GP 1    20526237268 HC PT THER SUPP EA 15 MIN 4/19/2021 Venus Blake PT GP 1        Patient was intermittently wearing a face mask during this therapy encounter. Therapist used appropriate personal protective equipment including eye protection, mask, and gloves.  Mask used was standard procedure mask. Appropriate PPE was worn during the entire therapy session. Hand hygiene was completed before and after therapy session. Patient is not in enhanced droplet precautions. Nazario Johnson was present  throughout this treatment.        PT G-Codes  Outcome Measure Options: AM-PAC 6 Clicks Basic Mobility (PT)  AM-PAC 6 Clicks Score (PT): 13    Venus Blake, PT  4/19/2021

## 2021-04-19 NOTE — PLAN OF CARE
Goal Outcome Evaluation:         VSS. Pt oriented but forgetful at times. Wife at bedside. SSI changed; scheduled insulin decreased. Pt wants to go home.

## 2021-04-19 NOTE — PLAN OF CARE
Goal Outcome Evaluation:        Outcome Summary: Pt progressing w ambulation this am as demonstrated by her increase in ambulation distance.  Pt continues to be WBAT on L LE and presents w and antalgic gait pattern compromising her safety w functional mobility.

## 2021-04-20 LAB
ANION GAP SERPL CALCULATED.3IONS-SCNC: 8.6 MMOL/L (ref 5–15)
BUN SERPL-MCNC: 44 MG/DL (ref 8–23)
BUN/CREAT SERPL: 26.8 (ref 7–25)
CALCIUM SPEC-SCNC: 9.2 MG/DL (ref 8.6–10.5)
CHLORIDE SERPL-SCNC: 90 MMOL/L (ref 98–107)
CO2 SERPL-SCNC: 36.4 MMOL/L (ref 22–29)
CREAT SERPL-MCNC: 1.64 MG/DL (ref 0.76–1.27)
GFR SERPL CREATININE-BSD FRML MDRD: 42 ML/MIN/1.73
GLUCOSE BLDC GLUCOMTR-MCNC: 145 MG/DL (ref 70–130)
GLUCOSE BLDC GLUCOMTR-MCNC: 205 MG/DL (ref 70–130)
GLUCOSE BLDC GLUCOMTR-MCNC: 230 MG/DL (ref 70–130)
GLUCOSE BLDC GLUCOMTR-MCNC: 245 MG/DL (ref 70–130)
GLUCOSE SERPL-MCNC: 250 MG/DL (ref 65–99)
POTASSIUM SERPL-SCNC: 3.4 MMOL/L (ref 3.5–5.2)
SODIUM SERPL-SCNC: 135 MMOL/L (ref 136–145)

## 2021-04-20 PROCEDURE — 97116 GAIT TRAINING THERAPY: CPT

## 2021-04-20 PROCEDURE — 63710000001 INSULIN GLARGINE PER 5 UNITS: Performed by: INTERNAL MEDICINE

## 2021-04-20 PROCEDURE — 25010000002 ENOXAPARIN PER 10 MG: Performed by: INTERNAL MEDICINE

## 2021-04-20 PROCEDURE — 82962 GLUCOSE BLOOD TEST: CPT

## 2021-04-20 PROCEDURE — 99232 SBSQ HOSP IP/OBS MODERATE 35: CPT | Performed by: NURSE PRACTITIONER

## 2021-04-20 PROCEDURE — 97110 THERAPEUTIC EXERCISES: CPT

## 2021-04-20 PROCEDURE — 63710000001 INSULIN LISPRO (HUMAN) PER 5 UNITS: Performed by: INTERNAL MEDICINE

## 2021-04-20 PROCEDURE — 80048 BASIC METABOLIC PNL TOTAL CA: CPT | Performed by: INTERNAL MEDICINE

## 2021-04-20 RX ORDER — POTASSIUM CHLORIDE 750 MG/1
40 TABLET, FILM COATED, EXTENDED RELEASE ORAL ONCE
Status: COMPLETED | OUTPATIENT
Start: 2021-04-20 | End: 2021-04-20

## 2021-04-20 RX ADMIN — INSULIN LISPRO 6 UNITS: 100 INJECTION, SOLUTION INTRAVENOUS; SUBCUTANEOUS at 18:10

## 2021-04-20 RX ADMIN — SODIUM CHLORIDE, PRESERVATIVE FREE 10 ML: 5 INJECTION INTRAVENOUS at 09:08

## 2021-04-20 RX ADMIN — INSULIN LISPRO 8 UNITS: 100 INJECTION, SOLUTION INTRAVENOUS; SUBCUTANEOUS at 09:10

## 2021-04-20 RX ADMIN — ENOXAPARIN SODIUM 40 MG: 40 INJECTION SUBCUTANEOUS at 16:26

## 2021-04-20 RX ADMIN — INSULIN GLARGINE 28 UNITS: 100 INJECTION, SOLUTION SUBCUTANEOUS at 06:25

## 2021-04-20 RX ADMIN — SODIUM CHLORIDE, PRESERVATIVE FREE 10 ML: 5 INJECTION INTRAVENOUS at 21:12

## 2021-04-20 RX ADMIN — POTASSIUM CHLORIDE 40 MEQ: 750 TABLET, EXTENDED RELEASE ORAL at 10:27

## 2021-04-20 RX ADMIN — BUMETANIDE 2 MG: 2 TABLET ORAL at 09:08

## 2021-04-20 RX ADMIN — TAMSULOSIN HYDROCHLORIDE 0.4 MG: 0.4 CAPSULE ORAL at 21:12

## 2021-04-20 RX ADMIN — CARVEDILOL 12.5 MG: 12.5 TABLET, FILM COATED ORAL at 18:10

## 2021-04-20 RX ADMIN — ACETAMINOPHEN 650 MG: 325 TABLET, FILM COATED ORAL at 21:17

## 2021-04-20 RX ADMIN — ASPIRIN 81 MG: 81 TABLET, COATED ORAL at 09:08

## 2021-04-20 RX ADMIN — BUMETANIDE 2 MG: 2 TABLET ORAL at 18:10

## 2021-04-20 RX ADMIN — SPIRONOLACTONE 25 MG: 25 TABLET ORAL at 12:33

## 2021-04-20 RX ADMIN — Medication 3 MG: at 21:17

## 2021-04-20 RX ADMIN — ACETAMINOPHEN 650 MG: 325 TABLET, FILM COATED ORAL at 01:27

## 2021-04-20 RX ADMIN — CARVEDILOL 12.5 MG: 12.5 TABLET, FILM COATED ORAL at 10:27

## 2021-04-20 RX ADMIN — TAMSULOSIN HYDROCHLORIDE 0.4 MG: 0.4 CAPSULE ORAL at 09:08

## 2021-04-20 RX ADMIN — INSULIN LISPRO 8 UNITS: 100 INJECTION, SOLUTION INTRAVENOUS; SUBCUTANEOUS at 12:33

## 2021-04-20 NOTE — PROGRESS NOTES
"    Patient Name: Dilip Verma  :1950  71 y.o.      Patient Care Team:  Dakota Avila MD as PCP - General (Family Medicine)  Morris Cai MD as Consulting Physician (Sleep Medicine)  Michaela Hylton MD as Consulting Physician (Cardiology)  Hardy Banerjee MD as Consulting Physician (Ophthalmology)  Chula Christianson MD as Consulting Physician (Ophthalmology)  Jason Sherman MD (Endocrinology)  Perla Mayen MD as Consulting Physician (Nephrology)  Federico Hebert MD as Consulting Physician (Pulmonary Disease)  Johan Dillard RN as Ambulatory  (Population Health)  Niraj Ravi MD as Consulting Physician (Orthopedic Surgery)  Papa Velasco MD as Consulting Physician (Urology)    Chief Complaint: follow up heart failure, volume overload.     Interval History: He didn't sleep well. This is an ongoing issue. He is resting in bed. Per wife, he wore the bipap about an hour last night. She is asking about off label use of prazosin for nightmares as well as for blood pressure.        Objective   Vital Signs  Temp:  [96.9 °F (36.1 °C)-97.8 °F (36.6 °C)] 97.3 °F (36.3 °C)  Heart Rate:  [59-64] 59  Resp:  [16-18] 18  BP: (105-132)/(56-64) 128/56    Intake/Output Summary (Last 24 hours) at 2021 1315  Last data filed at 2021 0759  Gross per 24 hour   Intake 240 ml   Output 2750 ml   Net -2510 ml     Flowsheet Rows      First Filed Value   Admission Height  175.3 cm (69\") Documented at 2021 1335   Admission Weight  105 kg (231 lb 7.7 oz) Documented at 2021 1335          Physical Exam:   General Appearance:    Asleep, did not arouse during my physical exam.   Lungs:     Diminished in the bases to auscultation.  Normal respiratory effort and rate.      Heart:    Regular rhythm and normal rate, normal S1 and S2, 1/6 systolic murmur murmurs, gallops or rubs.     Chest Wall:    No abnormalities observed   Abdomen:     Soft, nontender, positive " bowel sounds.     Extremities:   no cyanosis, clubbing, + edema.  No marked joint deformities.  Adequate musculoskeletal strength.       Results Review:    Results from last 7 days   Lab Units 04/20/21  0535   SODIUM mmol/L 135*   POTASSIUM mmol/L 3.4*   CHLORIDE mmol/L 90*   CO2 mmol/L 36.4*   BUN mg/dL 44*   CREATININE mg/dL 1.64*   GLUCOSE mg/dL 250*   CALCIUM mg/dL 9.2         Results from last 7 days   Lab Units 04/14/21  0328   WBC 10*3/mm3 6.12   HEMOGLOBIN g/dL 12.5*   HEMATOCRIT % 37.6   PLATELETS 10*3/mm3 192         Results from last 7 days   Lab Units 04/16/21  0511   MAGNESIUM mg/dL 1.9                   Medication Review:   aspirin, 81 mg, Oral, Daily  bisacodyl, 10 mg, Rectal, Daily  bumetanide, 2 mg, Oral, BID  carvedilol, 12.5 mg, Oral, BID With Meals  enoxaparin, 40 mg, Subcutaneous, Q24H  insulin glargine, 28 Units, Subcutaneous, QAM  insulin lispro, 0-24 Units, Subcutaneous, TID AC  insulin lispro, 6 Units, Subcutaneous, TID With Meals  polyethylene glycol, 17 g, Oral, Daily  sodium chloride, 10 mL, Intravenous, Q12H  spironolactone, 25 mg, Oral, Daily  tamsulosin, 0.4 mg, Oral, BID              Assessment/Plan   1. Acute on chronic diastolic heart failure  2. Chronic right sided heart failure with severe pulmonary hypertension RVSP 70 mmHg.   3. Coronary artery disease status post CABG  4. [place] on chronic kidney disease, stabilizing. Appreciate nephrology assistance.   5. Combined COCO/CSA - he does not tolerate much time on bipap at night. Wife thinks its the mask.   6. Diabetes mellitus type II, with circulatory complication. Insulin dependent. IM following.  7. History of orthopedic fractures , immobility - he is allowed to be weight baring. Encouraged continued work with physical therapy. CCP working on rehab placement. Wife to discuss with patient later today.  8. Abnormal urine culture with ESBL. ID saw. Likely colonization. No ABX.   9. Insomnia/axiety/PTSD - wife questioning about  medications. Unfortunately, sedating medications would be too high risk of respiratory depression especially with is noncompliance with bipap. Access center saw.     Nearing max benefit from inpatient therapy. Strongly encourage patient to consider rehab placement. Wife to talk to him today about it and work with CCP.     LEIDY Benoit  [facility]  04/20/21  13:15 EDT

## 2021-04-20 NOTE — PLAN OF CARE
Problem: Fall Injury Risk  Goal: Absence of Fall and Fall-Related Injury  Outcome: Ongoing, Progressing  Intervention: Promote Injury-Free Environment  Recent Flowsheet Documentation  Taken 4/20/2021 0400 by Pam Saez RN  Safety Promotion/Fall Prevention: safety round/check completed  Taken 4/20/2021 0209 by Pam Saez RN  Safety Promotion/Fall Prevention: safety round/check completed  Taken 4/20/2021 0032 by Pam Saez RN  Safety Promotion/Fall Prevention: safety round/check completed  Taken 4/19/2021 2209 by Pam Saez RN  Safety Promotion/Fall Prevention: safety round/check completed  Taken 4/19/2021 2015 by Pam Saez RN  Safety Promotion/Fall Prevention: safety round/check completed   Goal Outcome Evaluation:  Plan of Care Reviewed With: patient  Progress: no change  Outcome Summary: Alert with periods of confusion and unsteady gait, appears very weak. Tylenol given twice for pain last night. Wife remained at bedside during the night, paced on the monitor. Denies any chest pain or soa.

## 2021-04-20 NOTE — THERAPY TREATMENT NOTE
"Patient Name: Dilip Verma  : 1950    MRN: [id number]                              Today's Date: 2021       Admit Date: 2021    Visit Dx: No diagnosis found.  Patient Active Problem List   Diagnosis   • Anxiety   • Colon polyp   • Type 2 diabetes mellitus with hyperglycemia, with long-term current use of insulin (CMS/[facility])   • Erectile dysfunction   • Hyperlipidemia   • CAD (coronary artery disease)   • Hx of CABG   • Acute on chronic diastolic CHF (congestive heart failure) (CMS/[facility])   • Hypersomnia due to medical condition   • CSA (central sleep apnea)   • Periodic breathing   • HTN (hypertension)   • History of stroke   • CKD (chronic kidney disease) stage 3, GFR 30-59 ml/min (CMS/[facility])   • Urinary retention   • Acute on chronic renal failure (CMS/[facility])   • Acute on chronic diastolic (congestive) heart failure (CMS/[facility])   • Bacteriuria   • Rectal pain     Past Medical History:   Diagnosis Date   • AMENA (acute kidney injury) (CMS/[facility]) 12/3/2020   • Anxiety    • Chronic diastolic (congestive) heart failure (CMS/[facility])    • Chronic kidney disease     stones- had lithotripsy   • CKD (chronic kidney disease) stage 3, GFR 30-59 ml/min (CMS/[facility]) 2020   • Closed nondisplaced intertrochanteric fracture of left femur (CMS/HCC) 2020   • Colon polyp    • Coronary artery disease     CABG 2019   • Diabetes mellitus, type II (CMS/[facility])    • Erectile dysfunction    • H/O bone density study never   • Hyperlipidemia    • Hypersomnia    • Hypertension    • Kidney stone    • Orthostasis    • Periodic breathing    • Routine eye exam    • Sleep apnea     bipap   • Stroke (CMS/[facility])     \"slight stroke\"     Past Surgical History:   Procedure Laterality Date   • CARDIAC CATHETERIZATION N/A 7/15/2019    Procedure: Coronary angiography;  Surgeon: Carrie Price MD;  Location: [facility] INVASIVE LOCATION;  Service: Cardiovascular   • CARDIAC CATHETERIZATION N/A 7/15/2019    Procedure: Left Heart Cath;  " Surgeon: Carrie Price MD;  Location: [facility] CATH INVASIVE LOCATION;  Service: Cardiovascular   • CARDIAC CATHETERIZATION N/A 7/15/2019    Procedure: Left ventriculography;  Surgeon: Carrie Price MD;  Location: [facility] CATH INVASIVE LOCATION;  Service: Cardiovascular   • CARDIAC CATHETERIZATION  7/15/2019    Procedure: Functional Flow Lewis;  Surgeon: Carrie Price MD;  Location: [facility] CATH INVASIVE LOCATION;  Service: Cardiovascular   • CARDIAC CATHETERIZATION N/A 11/6/2019    Procedure: Right and Left Heart Cath;  Surgeon: Mya Smith MD;  Location: [facility] CATH INVASIVE LOCATION;  Service: Cardiovascular   • CARDIAC CATHETERIZATION N/A 11/6/2019    Procedure: Coronary angiography;  Surgeon: Mya Smith MD;  Location: [facility] CATH INVASIVE LOCATION;  Service: Cardiovascular   • CATARACT EXTRACTION EXTRACAPSULAR W/ INTRAOCULAR LENS IMPLANTATION Bilateral    • COLONOSCOPY  01/2015    dr jimenez   • CORONARY ARTERY BYPASS GRAFT N/A 7/18/2019    Procedure: INTRAOPERATIVE SHOAIB; STERNOTOMY CORONARY ARTERY BYPASS x 3  USING LEFT INTERNAL MAMMARY ARTERY GRAFT UTILIZING ENDOSCOPICALLY HARVESTED RIGHT GREATER SAPHENOUS VEIN AND PRP.;  Surgeon: Bill Devi MD;  Location: [facility] MAIN OR;  Service: Cardiothoracic   • DENTAL PROCEDURE      3 surgeries inder implants   • FEMUR IM NAILING/RODDING Left 12/3/2020    Procedure: LEFT HIP INTRAMEDULLARY NAIL;  Surgeon: Niraj Ravi MD;  Location: [facility] MAIN OR;  Service: Orthopedic Spine;  Laterality: Left;   • HERNIA REPAIR      inguinal bilaterally   • KIDNEY STONE SURGERY      lithotripsy     General Information     Row Name 04/20/21 1447          Physical Therapy Time and Intention    Document Type  therapy note (daily note)  -MD     Mode of Treatment  physical therapy  -MD     Row Name 04/20/21 1447          General Information    Existing Precautions/Restrictions  fall  -MD     Row Name 04/20/21 1447          Cognition    Orientation Status (Cognition)   oriented to;person  -MD       User Key  (r) = Recorded By, (t) = Taken By, (c) = Cosigned By    Initials Name Provider Type    Venus Plummer, PT Physical Therapist        Mobility     Row Name 04/20/21 1447          Bed Mobility    Sit-Supine Stacy (Bed Mobility)  verbal cues;moderate assist (50% patient effort);2 person assist  -MD     Assistive Device (Bed Mobility)  bed rails;draw sheet;head of bed elevated  -MD     Row Name 04/20/21 1447          Transfers    Comment (Transfers)  increased posterior lean w sit-stand  -MD     Row Name 04/20/21 1447          Sit-Stand Transfer    Sit-Stand Stacy (Transfers)  verbal cues;moderate assist (50% patient effort);2 person assist  -MD     Assistive Device (Sit-Stand Transfers)  walker, 4-wheeled  -MD     Row Name 04/20/21 1447          Gait/Stairs (Locomotion)    Stacy Level (Gait)  verbal cues;minimum assist (75% patient effort);moderate assist (50% patient effort);2 person assist  -MD     Assistive Device (Gait)  walker, 4-wheeled  -MD     Distance in Feet (Gait)  20  -MD     Deviations/Abnormal Patterns (Gait)  antalgic;base of support, narrow;festinating/shuffling;gait speed decreased  -MD     Bilateral Gait Deviations  forward flexed posture  -MD     Comment (Gait/Stairs)  Pt has numbness in B LE requiring VC to stay w/i the RWx when ambulating to prevent pt from rolling over his feet  -MD     Row Name 04/20/21 1447          Mobility    Extremity Weight-bearing Status  left lower extremity  -MD     Left Lower Extremity (Weight-bearing Status)  weight-bearing as tolerated (WBAT)  -MD       User Key  (r) = Recorded By, (t) = Taken By, (c) = Cosigned By    Initials Name Provider Type    Venus Plummer, PT Physical Therapist        Obj/Interventions    No documentation.       Goals/Plan    No documentation.       Clinical Impression     Row Name 04/20/21 1450          Pain    Additional Documentation  Pain Scale: FACES Pre/Post-Treatment (Group)  -MD      Row Name 04/20/21 1450          Pain Scale: Numbers Pre/Post-Treatment    Pain Intervention(s)  Ambulation/increased activity;Repositioned  -MD     Row Name 04/20/21 1450          Pain Scale: FACES Pre/Post-Treatment    Pain: FACES Scale, Pretreatment  6-->hurts even more  -MD     Pain Location - Side  Left  -MD     Pain Location - Orientation  lower  -MD     Pain Location  extremity  -MD     Row Name 04/20/21 1450          Plan of Care Review    Outcome Summary  Pt more fatigued this pm as compared to yesterday.  Pt requiring more assistance to come to standing showing more of a posterior lean this pm.  At this time [facility].  PT spoke with spouse and  regarding the above.  -MD     Row Name 04/20/21 1450          Positioning and Restraints    Pre-Treatment Position  bedside commode  -MD     Post Treatment Position  bed  -MD     In Bed  supine;call light within reach;exit alarm on  -MD       User Key  (r) = Recorded By, (t) = Taken By, (c) = Cosigned By    Initials Name Provider Type    Venus Plummer, PT Physical Therapist        Outcome Measures     Row Name 04/20/21 1454          How much help from another person do you currently need...    Turning from your back to your side while in flat bed without using bedrails?  3  -MD     Moving from lying on back to sitting on the side of a flat bed without bedrails?  3  -MD     Moving to and from a bed to a chair (including a wheelchair)?  2  -MD     Standing up from a chair using your arms (e.g., wheelchair, bedside chair)?  2  -MD     Climbing 3-5 steps with a railing?  1  -MD     To walk in hospital room?  2  -MD     AM-PAC 6 Clicks Score (PT)  13  -MD     Row Name 04/20/21 1454          Functional Assessment    Outcome Measure Options  AM-PAC 6 Clicks Basic Mobility (PT)  -MD       User Key  (r) = Recorded By, (t) = Taken By, (c) = Cosigned By    Initials Name Provider  Type    Venus Plummer, PT Physical Therapist        Physical Therapy Education                 Title: PT OT SLP Therapies (In Progress)     Topic: Physical Therapy (In Progress)     Point: Mobility training (In Progress)     Learning Progress Summary           Patient Acceptance, E,D, NR by KATHY at 4/18/2021 1702                   Point: Home exercise program (In Progress)     Learning Progress Summary           Patient Acceptance, E,D, NR by KATHY at 4/18/2021 1702                   Point: Body mechanics (In Progress)     Learning Progress Summary           Patient Acceptance, E,D, NR by KATHY at 4/18/2021 1702                   Point: Precautions (In Progress)     Learning Progress Summary           Patient Acceptance, E, NR by MD at 4/20/2021 1455    Acceptance, E, NR by MD at 4/19/2021 0928    Acceptance, E,D, NR by KATHY at 4/18/2021 1702                               User Key     Initials Effective Dates Name Provider Type Discipline     02/05/19 -  Abi Hernandez, PT Physical Therapist PT    MD 04/03/18 -  Venus Blake PT Physical Therapist PT              PT Recommendation and Plan     Outcome Summary: Pt more fatigued this pm as compared to yesterday.  Pt requiring more assistance to come to standing showing more of a posterior lean this pm.  At this time PT is recommending SNU following d/c from hosptial to increase pts safety and independence at home.  PT spoke with spouse and  regarding the above.     Time Calculation:   PT Charges     Row Name 04/20/21 1446             Time Calculation    Start Time  1420  -MD      Stop Time  1446  -MD      Time Calculation (min)  26 min  -MD      PT Received On  04/20/21  -MD      PT - Next Appointment  04/21/21  -MD         Timed Charges    64579 - Gait Training Minutes   16  -MD      97479 - PT Therapeutic Activity Minutes  10  -MD         Total Minutes    Timed Charges Total Minutes  26  -MD       Total Minutes  26  -MD        User Key  (r) = Recorded By, (t)  = Taken By, (c) = Cosigned By    Initials Name Provider Type    Venus Plummer, PT Physical Therapist        Therapy Charges for Today     Code Description Service Date Service Provider Modifiers Qty    33755907735 HC PT THER PROC EA 15 MIN 4/19/2021 Venus Blake, PT GP 1    97541157964 HC PT THER SUPP EA 15 MIN 4/19/2021 Venus Blake, PT GP 1    36023473752 HC PT THER PROC EA 15 MIN 4/20/2021 Venus Blake, PT GP 1    26913752279 HC GAIT TRAINING EA 15 MIN 4/20/2021 Venus Blake, PT GP 1    15653435449 HC PT THER SUPP EA 15 MIN 4/20/2021 Venus Blake, PT GP 2      Patient was not wearing a face mask during this therapy encounter. Therapist used appropriate personal protective equipment including eye protection, mask, and gloves.  Mask used was standard procedure mask. Appropriate PPE was worn during the entire therapy session. Hand hygiene was completed before and after therapy session. Patient is not in enhanced droplet precautions. Marisol Bhakta was present throughout treatment.        PT G-Codes  Outcome Measure Options: AM-PAC 6 Clicks Basic Mobility (PT)  AM-PAC 6 Clicks Score (PT): 13  AM-PAC 6 Clicks Score (OT): 12  Modified Jeremias Scale: 4 - Moderately severe disability.  Unable to walk without assistance, and unable to attend to own bodily needs without assistance.    Venus Blake PT  4/20/2021

## 2021-04-20 NOTE — NURSING NOTE
"[facility] follow-up.  Patient awake RIB, wife at bedside.  Patient feels he slept \"pretty heavy\" last night.  He and wife asked about Prazosin as patient has been having nightmares and also wonder if it could help with his BP too; encouraged to speak with physician.  RN informed too.    AC following.    "

## 2021-04-20 NOTE — PROGRESS NOTES
Continued Stay Note  [facility]     Patient Name: Dilip Verma  MRN: [id number]  Today's Date: 4/20/2021    Admit Date: 4/12/2021    Discharge Plan     Row Name 04/20/21 1020       Plan    Plan Comments  Called into room and spoke with wife by telephone due to isolation precautions.  She anticipates that patient is nearing dc.  Reviewed PT notes and recommendations with wife.  She states that patient has been to [facility] in the past, but she is unsure if he would want to return.  Stated that she would need to discuss dc planning with him, but that he didn't rest well and is asleep.  Advised that CCP will follow up with them after lunch. Ivett Ford RN        Discharge Codes    No documentation.       Expected Discharge Date and Time     Expected Discharge Date Expected Discharge Time    Apr 21, 2021             Ivett Ford RN

## 2021-04-20 NOTE — DISCHARGE PLACEMENT REQUEST
"Dilip Reilly (71 y.o. Male)     Date of Birth Social Security Number Address Home Phone MRN    1950  [id number] [address] [phone] [id number]    Hindu Marital Status          Yazdanism        Admission Date Admission Type Admitting Provider Attending Provider Department, Room/Bed    4/12/21 Urgent Amador Reyes Jr., MD [place], John Jr., MD [address], [address]    Discharge Date Discharge Disposition Discharge Destination                       Attending Provider: Amador Reyes Jr., MD    Allergies: Ace Inhibitors, Clonidine Derivatives, Losartan, Amlodipine, Ativan [Lorazepam], Xanax [Alprazolam], Minoxidil, Tetracycline    Isolation: Contact   Infection: ESBL Klebsiella (04/16/21)   Code Status: CPR    Ht: 175.3 cm (69\")   Wt: 83.2 kg (183 lb 6.4 oz)    Admission Cmt: None   Principal Problem: None                Active Insurance as of 4/12/2021     Primary Coverage     Payor Plan Insurance Group Employer/Plan Group    [facility] MEDICARE REPLACEMENT [facility] MEDICARE REPLACEMENT [id number]     Payor Plan Address Payor Plan Phone Number Payor Plan Fax Number Effective Dates    [address] [address]   1/1/2015 - None Entered    [facility] [id number]       Subscriber Name Subscriber Birth Date Member ID       DILIP REILLY 1950 [id number]                 Emergency Contacts      (Rel.) Home Phone Work Phone Mobile Phone    TommieBeny (Spouse) [phone] -- [phone]    TOMMIE SILVIA (Son) [phone] -- [phone]              "

## 2021-04-20 NOTE — PROGRESS NOTES
Name: Dilip Verma ADMIT: 2021   : 1950  PCP: Dakota Avila MD    MRN: [id number] LOS: 6 days   AGE/SEX: 71 y.o. male  ROOM: [facility]   Subjective   No chief complaint on file.    On oral diuertics  On insulin  -230s  Urinary retention with likely colonization per ID    ROS  No f/c  No n/v  No cp/palp  No soa/cough    Objective   Vital Signs  Temp:  [96.9 °F (36.1 °C)-97.8 °F (36.6 °C)] 97.3 °F (36.3 °C)  Heart Rate:  [59-64] 59  Resp:  [16-18] 18  BP: (105-132)/(56-64) 128/56  SpO2:  [92 %-97 %] 92 %  on  Flow (L/min):  [2] 2;   Device (Oxygen Therapy): nasal cannula  Body mass index is 27.08 kg/m².    Chronically ill  Physical Exam  Constitutional:       Appearance: He is well-developed.   HENT:      Head: Normocephalic and atraumatic.   Eyes:      General: No scleral icterus.  Cardiovascular:      Rate and Rhythm: Normal rate and regular rhythm.      Heart sounds: Normal heart sounds.   Pulmonary:      Effort: Pulmonary effort is normal. No respiratory distress.      Breath sounds: Normal breath sounds.   Abdominal:      General: There is no distension.      Palpations: Abdomen is soft.      Tenderness: There is no abdominal tenderness.   Musculoskeletal:      Cervical back: Neck supple.   Neurological:      Mental Status: He is alert.   Psychiatric:         Behavior: Behavior normal.         Results Review:       I reviewed the patient's new clinical results.  Results from last 7 days   Lab Units 21  0328   WBC 10*3/mm3 6.12   HEMOGLOBIN g/dL 12.5*   PLATELETS 10*3/mm3 192     Results from last 7 days   Lab Units 21  0535 21  0654 21  0732 21  0722   SODIUM mmol/L 135* 136 136 136   POTASSIUM mmol/L 3.4* 3.5 3.5 3.9   CHLORIDE mmol/L 90* 89* 89* 89*   CO2 mmol/L 36.4* 37.9* 38.0* 37.5*   BUN mg/dL 44* 39* 36* 32*   CREATININE mg/dL 1.64* 1.58* 1.32* 1.44*   GLUCOSE mg/dL 250* 231* 338* 257*   Estimated Creatinine Clearance: 47.9 mL/min (A) (by C-G formula  based on SCr of 1.64 mg/dL (H)).  Results from last 7 days   Lab Units 04/14/21  0328   ALBUMIN g/dL 3.30*     Results from last 7 days   Lab Units 04/20/21  0535 04/19/21  0654 04/18/21  0732 04/17/21  0722 04/16/21  0511 04/14/21  0328   CALCIUM mg/dL 9.2 9.0 8.8 9.4 9.1 8.5*   ALBUMIN g/dL  --   --   --   --   --  3.30*   MAGNESIUM mg/dL  --   --   --   --  1.9  --    PHOSPHORUS mg/dL  --   --   --   --   --  3.7         Coag     HbA1C   Lab Results   Component Value Date    HGBA1C 7.60 (H) 04/12/2021    HGBA1C 8.30 (H) 12/18/2020    HGBA1C 9.50 (H) 12/02/2020     Infection   Results from last 7 days   Lab Units 04/14/21  0610   URINECX  >100,000 CFU/mL Klebsiella pneumoniae ESBL*     Radiology(recent) No radiology results for the last day  No results found for: TROPONINT, TROPONINI, BNP  No components found for: TSH;2    aspirin, 81 mg, Oral, Daily  bisacodyl, 10 mg, Rectal, Daily  bumetanide, 2 mg, Oral, BID  carvedilol, 12.5 mg, Oral, BID With Meals  enoxaparin, 40 mg, Subcutaneous, Q24H  insulin glargine, 28 Units, Subcutaneous, QAM  insulin lispro, 0-24 Units, Subcutaneous, TID AC  insulin lispro, 6 Units, Subcutaneous, TID With Meals  polyethylene glycol, 17 g, Oral, Daily  sodium chloride, 10 mL, Intravenous, Q12H  spironolactone, 25 mg, Oral, Daily  tamsulosin, 0.4 mg, Oral, BID       Diet Regular; Consistent Carbohydrate, Low Sodium; No Added Salt      Assessment/Plan      Active Hospital Problems    Diagnosis  POA   • Bacteriuria [R82.71]  Unknown   • Rectal pain [K62.89]  Unknown   • Acute on chronic diastolic (congestive) heart failure (CMS/HCC) [I50.33]  Yes   • Acute on chronic renal failure (CMS/HCC) [N17.9, N18.9]  Unknown   • Urinary retention [R33.9]  Yes   • HTN (hypertension) [I10]  Yes   • CSA (central sleep apnea) [G47.31]  Yes   • Acute on chronic diastolic CHF (congestive heart failure) (CMS/HCC) [I50.33]  Unknown   • CAD (coronary artery disease) [I25.10]  Yes   • Type 2 diabetes mellitus  with hyperglycemia, with long-term current use of insulin (CMS/[facility]) [E11.65, Z79.4]  Not Applicable      Resolved Hospital Problems   No resolved problems to display.       Type 2 diabetes mellitus with hyperglycemia, with long-term current use of insulin      · BS worse yesterday as his appetite is improving most likely because he has been eating regular syrup, peanut butter, ice cream and candy bars. Dr. De La Rosa explained to him and wife not to eat anything not on the hospital provided tray and if there is something on there that he knows he shouldn't eat then he should not eat it.   · On insulin close to his home meds but increased SSI to avoid hypoglycemia.   · A1c 7.6%, no plans to change regimen at discharge.  He follows with an endocrinologist.    · Continue daily monitoring and make adjustments as needed.   · Insomnia difficult in the setting of also uncontrolled sleep apnea and multiple other co-morbidities. I encouraged him to discuss this with his sleep medicine physician.       NASIR RN  DW family  Greater than 36 minutes spent with greater than 50% counseling and coordinating care    Reviewed records  OK for dc from medicine perspective on home insulin.       Jama Benson MD  [facility]  04/20/21  09:39 EDT

## 2021-04-20 NOTE — PROGRESS NOTES
"  Infectious Diseases Progress Note    Tino Nagel MD     [facility]  [place]: 5 days  Patient Identification:  Name: Dilip Verma  Age: 71 y.o.  Sex: male  :  1950  MRN: [id number]         Primary Care Physician: Dakota Avila MD            Subjective: Feels okay denies any fever and chills.  Interval History: See consultation note.    Objective:    Scheduled Meds:aspirin, 81 mg, Oral, Daily  bisacodyl, 10 mg, Rectal, Daily  bumetanide, 2 mg, Oral, BID  carvedilol, 12.5 mg, Oral, BID With Meals  enoxaparin, 40 mg, Subcutaneous, Q24H  [START ON 2021] insulin glargine, 28 Units, Subcutaneous, QAM  insulin lispro, 0-24 Units, Subcutaneous, TID AC  insulin lispro, 6 Units, Subcutaneous, TID With Meals  polyethylene glycol, 17 g, Oral, Daily  sodium chloride, 10 mL, Intravenous, Q12H  spironolactone, 25 mg, Oral, Daily  tamsulosin, 0.4 mg, Oral, BID      Continuous Infusions:     Vital signs in last 24 hours:  Temp:  [96.8 °F (36 °C)-97.7 °F (36.5 °C)] 97.6 °F (36.4 °C)  Heart Rate:  [61-64] 64  Resp:  [16-18] 16  BP: (107-132)/(56-67) 116/62    Intake/Output:    Intake/Output Summary (Last 24 hours) at 2021 2305  Last data filed at 2021  Gross per 24 hour   Intake 720 ml   Output 2625 ml   Net -1905 ml       Exam:  /62 (BP Location: Left arm, Patient Position: Lying)   Pulse 64   Temp 97.6 °F (36.4 °C) (Oral)   Resp 16   Ht 175.3 cm (69\")   Wt 83.2 kg (183 lb 6.8 oz)   SpO2 96%   BMI 27.09 kg/m²   Patient is examined using the personal protective equipment as per guidelines from infection control for this particular patient as enacted.  Hand washing was performed before and after patient interaction.  General Appearance:    Alert, cooperative, no distress, AAOx3                          Head:    Normocephalic, without obvious abnormality, atraumatic                           Eyes:    PERRL, conjunctivae/corneas clear, EOM's intact, both eyes                       "   Throat:   Lips, tongue, gums normal; oral mucosa pink and moist                           Neck:   Supple, symmetrical, trachea midline, no JVD                         Lungs:    Clear to auscultation bilaterally, respirations unlabored                 Chest Wall:    No tenderness or deformity                          Heart:    Regular rate and rhythm, S1 and S2 normal, no murmur                  Abdomen:     Soft, non-tender, bowel sounds active, no masses, Hopkins catheter in place                 Extremities:   Extremities normal, atraumatic, no cyanosis or edema                        Pulses:   Pulses palpable in all extremities                            Skin:   Skin is warm and dry,  no rashes or palpable lesions                  Neurologic: Grossly nonfocal examination.    Data Review:    I reviewed the patient's new clinical results.  Results from last 7 days   Lab Units 04/14/21  0328   WBC 10*3/mm3 6.12   HEMOGLOBIN g/dL 12.5*   PLATELETS 10*3/mm3 192     Results from last 7 days   Lab Units 04/19/21  0654 04/18/21  0732 04/17/21  0722 04/16/21  0511 04/15/21  0859 04/14/21  0328 04/13/21  0930   SODIUM mmol/L 136 136 136 138 140 140 142   POTASSIUM mmol/L 3.5 3.5 3.9 3.4* 3.7 3.3* 3.6   CHLORIDE mmol/L 89* 89* 89* 93* 96* 97* 101   CO2 mmol/L 37.9* 38.0* 37.5* 36.2* 32.9* 31.7* 33.1*   BUN mg/dL 39* 36* 32* 33* 31* 29* 29*   CREATININE mg/dL 1.58* 1.32* 1.44* 1.27 1.43* 1.31* 1.41*   CALCIUM mg/dL 9.0 8.8 9.4 9.1 8.4* 8.5* 8.3*   GLUCOSE mg/dL 231* 338* 257* 207* 257* 181* 221*     Microbiology Results (last 10 days)     Procedure Component Value - Date/Time    COVID PRE-OP / PRE-PROCEDURE SCREENING ORDER (NO ISOLATION) - Swab, Nasopharynx [[id number]]  (Normal) Collected: 04/16/21 2030    Lab Status: Final result Specimen: Swab from Nasopharynx Updated: 04/16/21 2122    Narrative:      The following orders were created for panel order COVID PRE-OP / PRE-PROCEDURE SCREENING ORDER (NO ISOLATION) - Swab,  Nasopharynx.  Procedure                               Abnormality         Status                     ---------                               -----------         ------                     COVID-19,[facility]HOU[facility]...[[id number]]  Normal              Final result                 Please view results for these tests on the individual orders.    COVID-19,[facility] CEPHEID, NP SWAB IN TRANSPORT MEDIA 8-12 HR TAT - Swab, Nasopharynx [[id number]]  (Normal) Collected: 04/16/21 2030    Lab Status: Final result Specimen: Swab from Nasopharynx Updated: 04/16/21 2122     COVID19 Not Detected    Narrative:      Fact sheet for providers: https://www.fda.gov/media/357366/download     Fact sheet for patients: https://www.fda.gov/media/249451/download    Urine Culture - Urine, Urine, Catheter [[id number]]  (Abnormal)  (Susceptibility) Collected: 04/14/21 0610    Lab Status: Final result Specimen: Urine, Catheter Updated: 04/16/21 0936     Urine Culture >100,000 CFU/mL Klebsiella pneumoniae ESBL     Comment: Consider infectious disease consult.  Susceptibility results may not correlate to clinical outcomes.       Susceptibility      Klebsiella pneumoniae ESBL      KAT      Amikacin Susceptible      Ertapenem Susceptible      Gentamicin Resistant      Levofloxacin Resistant      Meropenem Susceptible      Nitrofurantoin Resistant      Piperacillin + Tazobactam Resistant      Tetracycline Resistant      Tobramycin Intermediate      Trimethoprim + Sulfamethoxazole Resistant               Linear View                           Assessment:    Type 2 diabetes mellitus with hyperglycemia, with long-term current use of insulin (CMS/[facility])    CAD (coronary artery disease)    Acute on chronic diastolic CHF (congestive heart failure) (CMS/[facility])    CSA (central sleep apnea)    HTN (hypertension)    Urinary retention    Acute on chronic renal failure (CMS/[facility])    Acute on chronic diastolic (congestive) heart failure (CMS/[facility])    Bacteriuria     Rectal pain  Recommendations/discussion:   · Continue to observe off of antibiotic therapy.  · Watch closely for evidence of local or systemic infection that could be attributed to urinary tract infection such as altered mental status fever local discomfort and pain and intolerance to Hopkins catheter.    Tino Nagel MD  4/19/2021  23:05 EDT    Much of this encounter note is an electronic transcription/translation of spoken language to printed text. The electronic translation of spoken language may permit erroneous, or at times, nonsensical words or phrases to be inadvertently transcribed; Although I have reviewed the note for such errors, some may still exist

## 2021-04-20 NOTE — PROGRESS NOTES
PROGRESS NOTE      Name: Dilip Verma ADMIT: 2021   : 1950  PCP: Dakota Avila MD    MRN: [id number] LOS: 6 days   AGE/SEX: 71 y.o. male  ROOM: [address] .    Patient seen and examined.  Renal function stable.  Hypokalemia 3.4       INTERVAL History      Patient is diuresing well      REVIEW OF SYSTEMS  Constitutional: + weakness.  HEENT:           No headache, otalgia, itchy eyes, nasal discharge or sore throat.  Cardiac:           No chest pain, orthopnea or PND. +dyspnea  Chest:              No cough, phlegm or wheezing.  Abdomen:        No abdominal pain, nausea or vomiting.  Neuro:             No focal weakness, abnormal movements or seizure-like activity.  :                  History of difficulty urinating  Ext:                  + swelling (improving)  ROS was otherwise negative except as mentioned in the [place].       Medications Prior to Admission   Medication Sig Dispense Refill Last Dose   • aspirin 81 MG EC tablet Take 81 mg by mouth Daily.   2021 at Unknown time   • carvedilol (COREG) 12.5 MG tablet Take one tablet twice daily with meals 180 tablet 2 2021 at Unknown time   • Cholecalciferol (Vitamin D) 125 MCG (5000 UT) capsule capsule Take 5,000 Units by mouth Daily.   2021 at Unknown time   • furosemide (LASIX) 40 MG tablet Take 1.5 tablets by mouth 2 (Two) Times a Day.   2021 at Unknown time   • hydrALAZINE (APRESOLINE) 25 MG tablet Take 1 tablet by mouth 2 (Two) Times a Day As Needed (for SBP > 160). 90 tablet 0 2021 at Unknown time   • Insulin Glargine, 2 Unit Dial, (TOUJEO) 300 UNIT/ML solution pen-injector injection Inject 28 Units under the skin into the appropriate area as directed Daily.   2021 at Unknown time   • insulin lispro (humaLOG) 100 UNIT/ML injection Inject 0-14 Units under the skin into the appropriate area as directed 3 (Three) Times a Day Before Meals.  12 2021 at Unknown time   • melatonin 3 MG tablet Take 9 mg by  "mouth Every Night.   Patient Taking Differently at Unknown time   • Oxymetazoline HCl (AFRIN NASAL SPRAY NA) into the nostril(s) as directed by provider.   4/11/2021 at Unknown time   • tamsulosin (FLOMAX) 0.4 MG capsule 24 hr capsule TAKE 2 CAPSULES BY MOUTH AT BEDTIME  (Patient taking differently: Take 1 capsule by mouth 2 (Two) Times a Day.) 60 capsule 0 Patient Taking Differently at Unknown time   • sennosides-docusate (senna-docusate sodium) 8.6-50 MG per tablet Take 2 tablets by mouth 2 (two) times a day. 56 tablet 0 Unknown at Unknown time     Allergies:  Ace inhibitors, Clonidine derivatives, Losartan, Amlodipine, Ativan [lorazepam], Xanax [alprazolam], Minoxidil, and Tetracycline     Objective   PHYSICAL EXAM  Trace lower extremity edema  /59 (BP Location: Left arm, Patient Position: Lying)   Pulse 62   Temp 96.9 °F (36.1 °C) (Oral)   Resp 18   Ht 175.3 cm (69\")   Wt 83.2 kg (183 lb 6.4 oz)   SpO2 95%   BMI 27.08 kg/m²   Intake/Output last 3 shifts:  I/O last 3 completed shifts:  In: 720 [P.O.:720]  Out: 3700 [Urine:3700]  Intake/Output this shift:  I/O this shift:  In: -   Out: 350 [Urine:350]         LABS:    CBC    Results from last 7 days   Lab Units 04/14/21  0328   WBC 10*3/mm3 6.12   HEMOGLOBIN g/dL 12.5*   PLATELETS 10*3/mm3 192     BMP   Results from last 7 days   Lab Units 04/20/21  0535 04/19/21  0654 04/18/21  0732 04/17/21  0722 04/16/21  0511 04/15/21  0859 04/14/21  0328   SODIUM mmol/L 135* 136 136 136 138 140 140   POTASSIUM mmol/L 3.4* 3.5 3.5 3.9 3.4* 3.7 3.3*   CHLORIDE mmol/L 90* 89* 89* 89* 93* 96* 97*   CO2 mmol/L 36.4* 37.9* 38.0* 37.5* 36.2* 32.9* 31.7*   BUN mg/dL 44* 39* 36* 32* 33* 31* 29*   CREATININE mg/dL 1.64* 1.58* 1.32* 1.44* 1.27 1.43* 1.31*   GLUCOSE mg/dL 250* 231* 338* 257* 207* 257* 181*   MAGNESIUM mg/dL  --   --   --   --  1.9  --   --    PHOSPHORUS mg/dL  --   --   --   --   --   --  3.7     CMP   Results from last 7 days   Lab Units 04/20/21  0535 " 04/19/21  0654 04/18/21  0732 04/17/21  0722 04/16/21  0511 04/15/21  0859 04/14/21  0328   SODIUM mmol/L 135* 136 136 136 138 140 140   POTASSIUM mmol/L 3.4* 3.5 3.5 3.9 3.4* 3.7 3.3*   CHLORIDE mmol/L 90* 89* 89* 89* 93* 96* 97*   CO2 mmol/L 36.4* 37.9* 38.0* 37.5* 36.2* 32.9* 31.7*   BUN mg/dL 44* 39* 36* 32* 33* 31* 29*   CREATININE mg/dL 1.64* 1.58* 1.32* 1.44* 1.27 1.43* 1.31*   GLUCOSE mg/dL 250* 231* 338* 257* 207* 257* 181*   ALBUMIN g/dL  --   --   --   --   --   --  3.30*     ABG      Imaging Results (Last 24 Hours)     ** No results found for the last 24 hours. **          Results for orders placed during the hospital encounter of 04/02/21    Adult Transthoracic Echo Complete w/ Color, Spectral and Contrast if Necessary Per Protocol    Interpretation Summary  · Left ventricular ejection fraction appears to be 51 - 55%. Left ventricular systolic function is low normal. Normal left ventricular cavity size noted. Left ventricular wall thickness is consistent with moderate concentric hypertrophy. All left ventricular wall segments contract normally. Left ventricular diastolic function is consistent with (grade II w/high LAP) pseudonormalization.  · Calculated right ventricular systolic pressure from tricuspid regurgitation is 70.1 mmHg.      Assessment/Plan   Assessment:        Type 2 diabetes mellitus with hyperglycemia, with long-term current use of insulin (CMS/[facility])    CAD (coronary artery disease)    Acute on chronic diastolic CHF (congestive heart failure) (CMS/[facility])    CSA (central sleep apnea)    HTN (hypertension)    Urinary retention    Acute on chronic renal failure (CMS/[facility])    Acute on chronic diastolic (congestive) heart failure (CMS/[facility])    Bacteriuria    Rectal pain    · Acute kidney injury, with CKD, admission creatinine 1.51 on 4/12, creatinine 4/9 1.72, cr 1.12 on 3/19 Urine studies not available. AMENA possible pre-renal with CHF, fluid overload on exam, recent UTIs, less likely obstruction  with light catheter in place draining urine.   · Chronic kidney disease, stage III, baseline creatinine noted around 1.3 on 08/2020. CKD most likely secondary to atherosclerotic disease.   · Acid/base stable  · Electrolytes  · Volume, overload on exam   · Echocardiogram  · Left ventricular ejection fraction appears to be 51 - 55%. Left ventricular systolic function is low normal. Normal left ventricular cavity size noted. Left ventricular wall thickness is consistent with moderate concentric hypertrophy. All left ventricular wall segments contract normally. Left ventricular diastolic function is consistent with (grade II w/high LAP) pseudonormalization.  · Right ventricular systolic pressure from tricuspid regurgitation is 70.1 mmHg  · BP, hypertensive - expect some improvement with aggressive diuresis   · Anemia  · Acute on chronic diastolic CHF  · GNB UTI  · Chronic right sided CHF, severe PHTN, RVSP 70mmHg  · CAD s/p CABG  · COCO,CSA on BiPap  · IDDM2  · Urinary retention, history of UTI  · History of orthopedic fractures, non-weight bearing status     Plan:      ? Renal function is stable.    ? Urine analysis grossly abnormal with hematuria and pyuria TNTC, lots of hyaline casts, 4+ bacteria, 3+ leukocyte esterase.   ? Urine protein/creatinine ratio 462mg/G  ? Volume, better  ? Tolerating po bumex 2 bid well  ? continue aldactone 25 mg po daily  ? Supplement potassium - will give 40mEq PO x1 dose   ? Urology input appreciated  ? I had again extensive d/w pt and wife about intermittent self cath, pt still not ready  ? UOP fair  ? Strict I/O  ? Daily weights  ? Avoid nephrotoxic medications  ? Patient with indwelling light catheter  ? Discussed with patient and spouse at bedside.   ? Cardiology plan noted.  ? Okay to discharge from renal standpoint  ? Will need outpatient follow up with our office in 2-3 weeks, office to call and set up appointment.   ? Discharge on Bumex 2mg PO BID.   ? Thank you for allowing me  to participate in Mr. Verma' care.     Patient was seen earlier by Dr. Mayen. He discussed exam findings and plan of care with me. This note is being transcribed for attending physician.    [facility]land[facility]  4/20/2021  11:58 EDT       The note was transcribed by LEIDY Garland.  I personally have examined the patient and interviewed the patient and modified the history, exam. I have reviewed the history, data, problems, assessment and plan .and I concur . Exam as described in the Progress note, with comments additions, revisions as noted by me.             Cristóbal Mayen MD  4/19/2021  [facility]

## 2021-04-20 NOTE — PLAN OF CARE
Goal Outcome Evaluation:        Outcome Summary: Pt more fatigued this pm as compared to yesterday.  Pt requiring more assistance to come to standing showing more of a posterior lean this pm.  At this time PT is recommending SNU following d/c from hosptial to increase pts safety and independence at home.  PT spoke with spouse and  regarding the above.

## 2021-04-20 NOTE — PLAN OF CARE
Goal Outcome Evaluation:         VSS. Pt up to [facility] with assist x2. Worked with PT. Pt is planning on going to rehab. Pt and wife decline some of the short acting insulins. Pt remains oriented but seems forgetful at times.

## 2021-04-20 NOTE — PROGRESS NOTES
Continued Stay Note  [facility]     Patient Name: Dilip Verma  MRN: [id number]  Today's Date: 4/20/2021    Admit Date: 4/12/2021    Discharge Plan     Row Name 04/20/21 1504       Plan    Plan  SNF- referral pending to [facility]    Plan Comments  Spoke with patient and wife at bedside.  They are agreeable to referral to [facility].  Referral sent and Francesca to evaluate.  Will need [facility] pre-cert.  Transfer packet started and in CCP office. Ivett Ford RN        Discharge Codes    No documentation.       Expected Discharge Date and Time     Expected Discharge Date Expected Discharge Time    Apr 21, 2021             Ivett Ford RN

## 2021-04-21 LAB
ANION GAP SERPL CALCULATED.3IONS-SCNC: 9.8 MMOL/L (ref 5–15)
BUN SERPL-MCNC: 42 MG/DL (ref 8–23)
BUN/CREAT SERPL: 26.9 (ref 7–25)
CALCIUM SPEC-SCNC: 8.9 MG/DL (ref 8.6–10.5)
CHLORIDE SERPL-SCNC: 94 MMOL/L (ref 98–107)
CO2 SERPL-SCNC: 32.2 MMOL/L (ref 22–29)
CREAT SERPL-MCNC: 1.56 MG/DL (ref 0.76–1.27)
GFR SERPL CREATININE-BSD FRML MDRD: 44 ML/MIN/1.73
GLUCOSE BLDC GLUCOMTR-MCNC: 141 MG/DL (ref 70–130)
GLUCOSE BLDC GLUCOMTR-MCNC: 174 MG/DL (ref 70–130)
GLUCOSE BLDC GLUCOMTR-MCNC: 197 MG/DL (ref 70–130)
GLUCOSE BLDC GLUCOMTR-MCNC: 335 MG/DL (ref 70–130)
GLUCOSE SERPL-MCNC: 207 MG/DL (ref 65–99)
POTASSIUM SERPL-SCNC: 3.9 MMOL/L (ref 3.5–5.2)
SODIUM SERPL-SCNC: 136 MMOL/L (ref 136–145)

## 2021-04-21 PROCEDURE — 94660 CPAP INITIATION&MGMT: CPT

## 2021-04-21 PROCEDURE — 63710000001 INSULIN LISPRO (HUMAN) PER 5 UNITS: Performed by: INTERNAL MEDICINE

## 2021-04-21 PROCEDURE — 63710000001 INSULIN GLARGINE PER 5 UNITS: Performed by: INTERNAL MEDICINE

## 2021-04-21 PROCEDURE — 94799 UNLISTED PULMONARY SVC/PX: CPT

## 2021-04-21 PROCEDURE — 99231 SBSQ HOSP IP/OBS SF/LOW 25: CPT | Performed by: NURSE PRACTITIONER

## 2021-04-21 PROCEDURE — 25010000002 ENOXAPARIN PER 10 MG: Performed by: INTERNAL MEDICINE

## 2021-04-21 PROCEDURE — 80048 BASIC METABOLIC PNL TOTAL CA: CPT | Performed by: INTERNAL MEDICINE

## 2021-04-21 PROCEDURE — 82962 GLUCOSE BLOOD TEST: CPT

## 2021-04-21 PROCEDURE — 97530 THERAPEUTIC ACTIVITIES: CPT

## 2021-04-21 RX ORDER — INSULIN LISPRO 100 [IU]/ML
0-14 INJECTION, SOLUTION INTRAVENOUS; SUBCUTANEOUS
Status: DISCONTINUED | OUTPATIENT
Start: 2021-04-21 | End: 2021-04-23 | Stop reason: HOSPADM

## 2021-04-21 RX ADMIN — ASPIRIN 81 MG: 81 TABLET, COATED ORAL at 08:38

## 2021-04-21 RX ADMIN — Medication 3 MG: at 20:29

## 2021-04-21 RX ADMIN — TAMSULOSIN HYDROCHLORIDE 0.4 MG: 0.4 CAPSULE ORAL at 20:29

## 2021-04-21 RX ADMIN — CARVEDILOL 12.5 MG: 12.5 TABLET, FILM COATED ORAL at 16:57

## 2021-04-21 RX ADMIN — TAMSULOSIN HYDROCHLORIDE 0.4 MG: 0.4 CAPSULE ORAL at 08:37

## 2021-04-21 RX ADMIN — INSULIN LISPRO 10 UNITS: 100 INJECTION, SOLUTION INTRAVENOUS; SUBCUTANEOUS at 12:02

## 2021-04-21 RX ADMIN — ACETAMINOPHEN 650 MG: 325 TABLET, FILM COATED ORAL at 08:37

## 2021-04-21 RX ADMIN — SPIRONOLACTONE 25 MG: 25 TABLET ORAL at 08:37

## 2021-04-21 RX ADMIN — INSULIN GLARGINE 28 UNITS: 100 INJECTION, SOLUTION SUBCUTANEOUS at 07:00

## 2021-04-21 RX ADMIN — CARVEDILOL 12.5 MG: 12.5 TABLET, FILM COATED ORAL at 08:38

## 2021-04-21 RX ADMIN — ACETAMINOPHEN 650 MG: 325 TABLET, FILM COATED ORAL at 20:29

## 2021-04-21 RX ADMIN — INSULIN LISPRO 6 UNITS: 100 INJECTION, SOLUTION INTRAVENOUS; SUBCUTANEOUS at 16:58

## 2021-04-21 RX ADMIN — INSULIN LISPRO 6 UNITS: 100 INJECTION, SOLUTION INTRAVENOUS; SUBCUTANEOUS at 12:02

## 2021-04-21 RX ADMIN — BUMETANIDE 2 MG: 2 TABLET ORAL at 08:37

## 2021-04-21 RX ADMIN — BUMETANIDE 2 MG: 2 TABLET ORAL at 16:57

## 2021-04-21 RX ADMIN — SODIUM CHLORIDE, PRESERVATIVE FREE 10 ML: 5 INJECTION INTRAVENOUS at 20:29

## 2021-04-21 RX ADMIN — ENOXAPARIN SODIUM 40 MG: 40 INJECTION SUBCUTANEOUS at 16:56

## 2021-04-21 RX ADMIN — INSULIN LISPRO 3 UNITS: 100 INJECTION, SOLUTION INTRAVENOUS; SUBCUTANEOUS at 16:56

## 2021-04-21 RX ADMIN — ACETAMINOPHEN 650 MG: 325 TABLET, FILM COATED ORAL at 02:10

## 2021-04-21 NOTE — PROGRESS NOTES
"    Patient Name: Dilip Verma  :1950  71 y.o.      Patient Care Team:  Dakota Avila MD as PCP - General (Family Medicine)  Morris Cai MD as Consulting Physician (Sleep Medicine)  Michaela Hylton MD as Consulting Physician (Cardiology)  Hardy Banerjee MD as Consulting Physician (Ophthalmology)  Chula Christianson MD as Consulting Physician (Ophthalmology)  Jason Sherman MD (Endocrinology)  Perla Mayen MD as Consulting Physician (Nephrology)  Federico Hebert MD as Consulting Physician (Pulmonary Disease)  Johan Dillard RN as Ambulatory  ([facility])  Niraj Ravi MD as Consulting Physician (Orthopedic Surgery)  Papa Velasco MD as Consulting Physician (Urology)    Chief Complaint: follow up heart failure, volume overload.     Interval History: He is sitting on the side of the bed eating lunch. He continues to have all over \"nerve pain\". He did not sleep well last night. Wife continues to ask about medications for sleep/night terrors/PTSD etc.        Objective   Vital Signs  Temp:  [97.6 °F (36.4 °C)-98.7 °F (37.1 °C)] 97.9 °F (36.6 °C)  Heart Rate:  [56-65] 56  Resp:  [16-18] 18  BP: (113-156)/(59-78) 113/59    Intake/Output Summary (Last 24 hours) at 2021 1409  Last data filed at 2021 1239  Gross per 24 hour   Intake 360 ml   Output 2250 ml   Net -1890 ml     Flowsheet Rows      First Filed Value   Admission Height  175.3 cm (69\") Documented at 2021 1335   Admission Weight  105 kg (231 lb 7.7 oz) Documented at 2021 1335          Physical Exam:   General Appearance:    Asleep, did not arouse during my physical exam.   Lungs:     Diminished in the bases to auscultation.  Normal respiratory effort and rate.      Heart:    Regular rhythm and normal rate, normal S1 and S2, 1/6 systolic murmur murmurs, gallops or rubs.     Chest Wall:    No abnormalities observed   Abdomen:     Soft, nontender, positive bowel sounds.   "   Extremities:   no cyanosis, clubbing, + edema.  No marked joint deformities.  Adequate musculoskeletal strength.       Results Review:    Results from last 7 days   Lab Units 04/21/21  0431   SODIUM mmol/L 136   POTASSIUM mmol/L 3.9   CHLORIDE mmol/L 94*   CO2 mmol/L 32.2*   BUN mg/dL 42*   CREATININE mg/dL 1.56*   GLUCOSE mg/dL 207*   CALCIUM mg/dL 8.9                 Results from last 7 days   Lab Units 04/16/21  0511   MAGNESIUM mg/dL 1.9                   Medication Review:   aspirin, 81 mg, Oral, Daily  bisacodyl, 10 mg, Rectal, Daily  bumetanide, 2 mg, Oral, BID  carvedilol, 12.5 mg, Oral, BID With Meals  enoxaparin, 40 mg, Subcutaneous, Q24H  insulin glargine, 28 Units, Subcutaneous, QAM  insulin lispro, 0-14 Units, Subcutaneous, TID AC  insulin lispro, 6 Units, Subcutaneous, TID With Meals  polyethylene glycol, 17 g, Oral, Daily  sodium chloride, 10 mL, Intravenous, Q12H  spironolactone, 25 mg, Oral, Daily  tamsulosin, 0.4 mg, Oral, BID              Assessment/Plan   1. Acute on chronic diastolic heart failure  2. Chronic right sided heart failure with severe pulmonary hypertension RVSP 70 mmHg.   3. Coronary artery disease status post CABG  4. Damion on chronic kidney disease, stabilizing. Appreciate nephrology assistance.   5. Combined COCO/CSA - he does not tolerate much time on bipap at night. Wife thinks its the mask.   6. Diabetes mellitus type II, with circulatory complication. Insulin dependent. IM following.  7. History of orthopedic fractures , immobility - he is allowed to be weight baring. Encouraged continued work with physical therapy. CCP working on rehab placement. Wife to discuss with patient later today.  8. Abnormal urine culture with ESBL. ID saw. Likely colonization. No ABX.   9. Insomnia/axiety/PTSD - wife questioning about medications. Unfortunately, sedating medications would be too high risk of respiratory depression especially with is noncompliance with bipap. Access center saw.    Wife continues to ask about possible medications to help with this.     Nearing max benefit from inpatient therapy. CCP working on discharge placment. DC when bed available.      LEIDY Benoit  [facility]  04/21/21  14:09 EDT

## 2021-04-21 NOTE — PROGRESS NOTES
"  Infectious Diseases Progress Note    Tino Nagel MD     [facility]  [place]: 6 days  Patient Identification:  Name: Dilip Verma  Age: 71 y.o.  Sex: male  :  1950  MRN: [id number]         Primary Care Physician: Dakota Avila MD            Subjective: Continues to feel well and denies any fever and chills of pain.  Interval History: See consultation note.    Objective:    Scheduled Meds:aspirin, 81 mg, Oral, Daily  bisacodyl, 10 mg, Rectal, Daily  bumetanide, 2 mg, Oral, BID  carvedilol, 12.5 mg, Oral, BID With Meals  enoxaparin, 40 mg, Subcutaneous, Q24H  insulin glargine, 28 Units, Subcutaneous, QAM  insulin lispro, 0-24 Units, Subcutaneous, TID AC  insulin lispro, 6 Units, Subcutaneous, TID With Meals  polyethylene glycol, 17 g, Oral, Daily  sodium chloride, 10 mL, Intravenous, Q12H  spironolactone, 25 mg, Oral, Daily  tamsulosin, 0.4 mg, Oral, BID      Continuous Infusions:     Vital signs in last 24 hours:  Temp:  [96.9 °F (36.1 °C)-98.7 °F (37.1 °C)] 98.7 °F (37.1 °C)  Heart Rate:  [59-63] 63  Resp:  [16-18] 18  BP: (105-156)/(56-78) 129/61    Intake/Output:    Intake/Output Summary (Last 24 hours) at 2021  Last data filed at 2021 1810  Gross per 24 hour   Intake --   Output 2500 ml   Net -2500 ml       Exam:  /61 (BP Location: Left arm, Patient Position: Lying)   Pulse 63   Temp 98.7 °F (37.1 °C) (Oral)   Resp 18   Ht 175.3 cm (69\")   Wt 83.2 kg (183 lb 6.4 oz)   SpO2 95%   BMI 27.08 kg/m²   Patient is examined using the personal protective equipment as per guidelines from infection control for this particular patient as enacted.  Hand washing was performed before and after patient interaction.  General Appearance:    Alert, cooperative, no distress, AAOx3                          Head:    Normocephalic, without obvious abnormality, atraumatic                           Eyes:    PERRL, conjunctivae/corneas clear, EOM's intact, both eyes                      "    Throat:   Lips, tongue, gums normal; oral mucosa pink and moist                           Neck:   Supple, symmetrical, trachea midline, no JVD                         Lungs:    Clear to auscultation bilaterally, respirations unlabored                 Chest Wall:    No tenderness or deformity                          Heart:    Regular rate and rhythm, S1 and S2 normal, no murmur                  Abdomen:     Soft, non-tender, bowel sounds active, no masses, Hopkins catheter in place                 Extremities:   Extremities normal, atraumatic, no cyanosis or edema                        Pulses:   Pulses palpable in all extremities                            Skin:   Skin is warm and dry,  no rashes or palpable lesions                  Neurologic: Grossly nonfocal examination.    Data Review:    I reviewed the patient's new clinical results.  Results from last 7 days   Lab Units 04/14/21  0328   WBC 10*3/mm3 6.12   HEMOGLOBIN g/dL 12.5*   PLATELETS 10*3/mm3 192     Results from last 7 days   Lab Units 04/20/21  0535 04/19/21  0654 04/18/21  0732 04/17/21  0722 04/16/21  0511 04/15/21  0859 04/14/21  0328   SODIUM mmol/L 135* 136 136 136 138 140 140   POTASSIUM mmol/L 3.4* 3.5 3.5 3.9 3.4* 3.7 3.3*   CHLORIDE mmol/L 90* 89* 89* 89* 93* 96* 97*   CO2 mmol/L 36.4* 37.9* 38.0* 37.5* 36.2* 32.9* 31.7*   BUN mg/dL 44* 39* 36* 32* 33* 31* 29*   CREATININE mg/dL 1.64* 1.58* 1.32* 1.44* 1.27 1.43* 1.31*   CALCIUM mg/dL 9.2 9.0 8.8 9.4 9.1 8.4* 8.5*   GLUCOSE mg/dL 250* 231* 338* 257* 207* 257* 181*     Microbiology Results (last 10 days)     Procedure Component Value - Date/Time    COVID PRE-OP / PRE-PROCEDURE SCREENING ORDER (NO ISOLATION) - Swab, Nasopharynx [[id number]]  (Normal) Collected: 04/16/21 2030    Lab Status: Final result Specimen: Swab from Nasopharynx Updated: 04/16/21 2122    Narrative:      The following orders were created for panel order COVID PRE-OP / PRE-PROCEDURE SCREENING ORDER (NO ISOLATION) - Swab,  Nasopharynx.  Procedure                               Abnormality         Status                     ---------                               -----------         ------                     COVID-19,[facility]HOUSE...[[id number]]  Normal              Final result                 Please view results for these tests on the individual orders.    COVID-19,[facility]HOU[facility] CEPHEID, NP SWAB IN TRANSPORT MEDIA 8-12 HR TAT - Swab, Nasopharynx [[id number]]  (Normal) Collected: 04/16/21 2030    Lab Status: Final result Specimen: Swab from Nasopharynx Updated: 04/16/21 2122     COVID19 Not Detected    Narrative:      Fact sheet for providers: https://www.fda.gov/media/027167/download     Fact sheet for patients: https://www.fda.gov/media/163613/download    Urine Culture - Urine, Urine, Catheter [[id number]]  (Abnormal)  (Susceptibility) Collected: 04/14/21 0610    Lab Status: Final result Specimen: Urine, Catheter Updated: 04/16/21 0936     Urine Culture >100,000 CFU/mL Klebsiella pneumoniae ESBL     Comment: Consider infectious disease consult.  Susceptibility results may not correlate to clinical outcomes.       Susceptibility      Klebsiella pneumoniae ESBL      KAT      Amikacin Susceptible      Ertapenem Susceptible      Gentamicin Resistant      Levofloxacin Resistant      Meropenem Susceptible      Nitrofurantoin Resistant      Piperacillin + Tazobactam Resistant      Tetracycline Resistant      Tobramycin Intermediate      Trimethoprim + Sulfamethoxazole Resistant               Linear View                           Assessment:    Type 2 diabetes mellitus with hyperglycemia, with long-term current use of insulin (CMS/[facility])    CAD (coronary artery disease)    Acute on chronic diastolic CHF (congestive heart failure) (CMS/[facility])    CSA (central sleep apnea)    HTN (hypertension)    Urinary retention    Acute on chronic renal failure (CMS/[facility])    Acute on chronic diastolic (congestive) heart failure (CMS/[facility])    Bacteriuria     Rectal pain  Recommendations/discussion:   · Continue to observe off of antibiotic therapy.  · Watch closely for evidence of local or systemic infection that could be attributed to urinary tract infection such as altered mental status fever local discomfort and pain and intolerance to Hopkins catheter.    Tino Nagel MD  4/20/2021  21:27 EDT    Much of this encounter note is an electronic transcription/translation of spoken language to printed text. The electronic translation of spoken language may permit erroneous, or at times, nonsensical words or phrases to be inadvertently transcribed; Although I have reviewed the note for such errors, some may still exist

## 2021-04-21 NOTE — PLAN OF CARE
Goal Outcome Evaluation:   Patient c/o of nerve pain, Tylenol given x2, VSS, on 2L O2, BiPap not tolerated, light catheter completed, wife at bedside, plan to D/C to rehab 4/22, will CTM

## 2021-04-21 NOTE — PLAN OF CARE
Goal Outcome Evaluation:  Plan of Care Reviewed With: patient, spouse  Progress: improving  Outcome Summary: Pt improving and req less assist for bed mobility - CGA/SV for s>s w/ extra time and Min A for s<s. Pt very talkative and req redirect to task. Pt amb 20' w/ min A x 2 / fww and exhibited unsteadiness w/ no LOB. Pt limited in distance by fatigue and pain in LLE. Pt would benefit from SNF after dc to improve his strength, mobility, and endurance.    Patient was intermittently wearing a face mask during this therapy encounter. Therapist used appropriate personal protective equipment including gown, eye protection, mask and gloves.  Mask used was standard procedure mask. Appropriate PPE was worn during the entire therapy session. Hand hygiene was completed before and after therapy session. Patient is not in enhanced droplet precautions.  PT student: Marisol Bhakta.

## 2021-04-21 NOTE — PROGRESS NOTES
PROGRESS NOTE      Name: Dilip Verma ADMIT: 2021   : 1950  PCP: Dakota Avila MD    MRN: [id number] LOS: 7 days   AGE/SEX: 71 y.o. male  ROOM: [address] .    Patient seen and examined.  Renal function stable.         INTERVAL History      Patient is diuresing well      REVIEW OF SYSTEMS  Constitutional: + weakness.  HEENT:           No headache, otalgia, itchy eyes, nasal discharge or sore throat.  Cardiac:           No chest pain, orthopnea or PND. +dyspnea  Chest:              No cough, phlegm or wheezing.  Abdomen:        No abdominal pain, nausea or vomiting.  Neuro:             No focal weakness, abnormal movements or seizure-like activity.  :                  History of difficulty urinating  Ext:                  + swelling (improving)  ROS was otherwise negative except as mentioned in the [place].       Medications Prior to Admission   Medication Sig Dispense Refill Last Dose   • aspirin 81 MG EC tablet Take 81 mg by mouth Daily.   2021 at Unknown time   • carvedilol (COREG) 12.5 MG tablet Take one tablet twice daily with meals 180 tablet 2 2021 at Unknown time   • Cholecalciferol (Vitamin D) 125 MCG (5000 UT) capsule capsule Take 5,000 Units by mouth Daily.   2021 at Unknown time   • furosemide (LASIX) 40 MG tablet Take 1.5 tablets by mouth 2 (Two) Times a Day.   2021 at Unknown time   • hydrALAZINE (APRESOLINE) 25 MG tablet Take 1 tablet by mouth 2 (Two) Times a Day As Needed (for SBP > 160). 90 tablet 0 2021 at Unknown time   • Insulin Glargine, 2 Unit Dial, (TOUJEO) 300 UNIT/ML solution pen-injector injection Inject 28 Units under the skin into the appropriate area as directed Daily.   2021 at Unknown time   • insulin lispro (humaLOG) 100 UNIT/ML injection Inject 0-14 Units under the skin into the appropriate area as directed 3 (Three) Times a Day Before Meals.  12 2021 at Unknown time   • melatonin 3 MG tablet Take 9 mg by mouth Every  "Night.   Patient Taking Differently at Unknown time   • Oxymetazoline HCl (AFRIN NASAL SPRAY NA) into the nostril(s) as directed by provider.   4/11/2021 at Unknown time   • tamsulosin (FLOMAX) 0.4 MG capsule 24 hr capsule TAKE 2 CAPSULES BY MOUTH AT BEDTIME  (Patient taking differently: Take 1 capsule by mouth 2 (Two) Times a Day.) 60 capsule 0 Patient Taking Differently at Unknown time   • sennosides-docusate (senna-docusate sodium) 8.6-50 MG per tablet Take 2 tablets by mouth 2 (two) times a day. 56 tablet 0 Unknown at Unknown time     Allergies:  Ace inhibitors, Clonidine derivatives, Losartan, Amlodipine, Ativan [lorazepam], Xanax [alprazolam], Minoxidil, and Tetracycline     Objective   PHYSICAL EXAM  Trace lower extremity edema  /66 (BP Location: Left arm, Patient Position: Lying)   Pulse 61   Temp 98.3 °F (36.8 °C) (Oral)   Resp 16   Ht 175.3 cm (69\")   Wt 83.2 kg (183 lb 6.4 oz)   SpO2 95%   BMI 27.08 kg/m²   Intake/Output last 3 shifts:  I/O last 3 completed shifts:  In: -   Out: 4150 [Urine:4150]  Intake/Output this shift:  I/O this shift:  In: 240 [P.O.:240]  Out: 400 [Urine:400]         LABS:    CBC        BMP   Results from last 7 days   Lab Units 04/21/21  0431 04/20/21  0535 04/19/21  0654 04/18/21  0732 04/17/21  0722 04/16/21  0511 04/15/21  0859   SODIUM mmol/L 136 135* 136 136 136 138 140   POTASSIUM mmol/L 3.9 3.4* 3.5 3.5 3.9 3.4* 3.7   CHLORIDE mmol/L 94* 90* 89* 89* 89* 93* 96*   CO2 mmol/L 32.2* 36.4* 37.9* 38.0* 37.5* 36.2* 32.9*   BUN mg/dL 42* 44* 39* 36* 32* 33* 31*   CREATININE mg/dL 1.56* 1.64* 1.58* 1.32* 1.44* 1.27 1.43*   GLUCOSE mg/dL 207* 250* 231* 338* 257* 207* 257*   MAGNESIUM mg/dL  --   --   --   --   --  1.9  --      CMP   Results from last 7 days   Lab Units 04/21/21  0431 04/20/21  0535 04/19/21  0654 04/18/21  0732 04/17/21  0722 04/16/21  0511 04/15/21  0859   SODIUM mmol/L 136 135* 136 136 136 138 140   POTASSIUM mmol/L 3.9 3.4* 3.5 3.5 3.9 3.4* 3.7 "   CHLORIDE mmol/L 94* 90* 89* 89* 89* 93* 96*   CO2 mmol/L 32.2* 36.4* 37.9* 38.0* 37.5* 36.2* 32.9*   BUN mg/dL 42* 44* 39* 36* 32* 33* 31*   CREATININE mg/dL 1.56* 1.64* 1.58* 1.32* 1.44* 1.27 1.43*   GLUCOSE mg/dL 207* 250* 231* 338* 257* 207* 257*     ABG      Imaging Results (Last 24 Hours)     ** No results found for the last 24 hours. **          Results for orders placed during the hospital encounter of 04/02/21    Adult Transthoracic Echo Complete w/ Color, Spectral and Contrast if Necessary Per Protocol    Interpretation Summary  · Left ventricular ejection fraction appears to be 51 - 55%. Left ventricular systolic function is low normal. Normal left ventricular cavity size noted. Left ventricular wall thickness is consistent with moderate concentric hypertrophy. All left ventricular wall segments contract normally. Left ventricular diastolic function is consistent with (grade II w/high LAP) pseudonormalization.  · Calculated right ventricular systolic pressure from tricuspid regurgitation is 70.1 mmHg.      Assessment/Plan   Assessment:        Type 2 diabetes mellitus with hyperglycemia, with long-term current use of insulin (CMS/[facility])    CAD (coronary artery disease)    Acute on chronic diastolic CHF (congestive heart failure) (CMS/[facility])    CSA (central sleep apnea)    HTN (hypertension)    Urinary retention    Acute on chronic renal failure (CMS/[facility])    Acute on chronic diastolic (congestive) heart failure (CMS/[facility])    Bacteriuria    Rectal pain    · Acute kidney injury, with CKD, admission creatinine 1.51 on 4/12, creatinine 4/9 1.72, cr 1.12 on 3/19 Urine studies not available. AMENA possible pre-renal with CHF, fluid overload on exam, recent UTIs, less likely obstruction with light catheter in place draining urine.   · Chronic kidney disease, stage III, baseline creatinine noted around 1.3 on 08/2020. CKD most likely secondary to atherosclerotic disease.   · Acid/base stable  · Electrolytes  · Volume,  overload on exam   · Echocardiogram  · Left ventricular ejection fraction appears to be 51 - 55%. Left ventricular systolic function is low normal. Normal left ventricular cavity size noted. Left ventricular wall thickness is consistent with moderate concentric hypertrophy. All left ventricular wall segments contract normally. Left ventricular diastolic function is consistent with (grade II w/high LAP) pseudonormalization.  · Right ventricular systolic pressure from tricuspid regurgitation is 70.1 mmHg  · BP, hypertensive - expect some improvement with aggressive diuresis   · Anemia  · Acute on chronic diastolic CHF  · GNB UTI  · Chronic right sided CHF, severe PHTN, RVSP 70mmHg  · CAD s/p CABG  · COCO,CSA on BiPap  · IDDM2  · Urinary retention, history of UTI  · History of orthopedic fractures, non-weight bearing status     Plan:      ? Renal function is stable.    Creatinine at baseline 1.56, potassium is stable.  ? Urine analysis grossly abnormal with hematuria and pyuria TNTC, lots of hyaline casts, 4+ bacteria, 3+ leukocyte esterase.   ? Urine protein/creatinine ratio 462mg/G  ? Volume, better  ? Tolerating po bumex 2 bid well  ? continue aldactone 25 mg po daily  ? Urology input appreciated  ? I had again extensive d/w pt and wife about intermittent self cath, pt still not ready  ? UOP fair  ? Strict I/O  ? Daily weights  ? Avoid nephrotoxic medications  ? Patient with indwelling light catheter  ? Discussed with patient and spouse at bedside.   ? Cardiology plan noted.  ? Okay to discharge from renal standpoint  ? Will need outpatient follow up with our office in 2-3 weeks, office to call and set up appointment.   ? Discharge on Bumex 2mg PO BID.   ? Aldactone 25 mg p.o. daily.  ? Thank you for allowing me to participate in Mr. Verma' care.     Patient was seen earlier by Dr. Mayen. He discussed exam findings and plan of care with me. This note is being transcribed for attending physician.    Cristóbal Mayen,  MD  [facility]  4/21/2021  10:06 EDT       The note was transcribed by LEIDY Garland.  I personally have examined the patient and interviewed the patient and modified the history, exam. I have reviewed the history, data, problems, assessment and plan .and I concur . Exam as described in the Progress note, with comments additions, revisions as noted by me.             Cristóbal Mayen MD  4/19/2021  [facility]

## 2021-04-21 NOTE — PROGRESS NOTES
Name: Dilip Verma ADMIT: 2021   : 1950  PCP: Dakota Avila MD    MRN: [id number] LOS: 7 days   AGE/SEX: 71 y.o. male  ROOM: [facility]   Subjective   No chief complaint on file.    On oral diuertics  On insulin  BG 100s-200s  Urinary retention with likely colonization per ID  Not sleeping well but is chronic issue    ROS  No f/c  No n/v  No cp/palp  No soa/cough    Objective   Vital Signs  Temp:  [97.6 °F (36.4 °C)-98.7 °F (37.1 °C)] 97.9 °F (36.6 °C)  Heart Rate:  [56-65] 56  Resp:  [16-18] 18  BP: (113-156)/(59-78) 113/59  SpO2:  [93 %-95 %] 93 %  on  Flow (L/min):  [2] 2;   Device (Oxygen Therapy): nasal cannula  Body mass index is 27.08 kg/m².    Chronically ill  Physical Exam  Constitutional:       Appearance: He is well-developed.   HENT:      Head: Normocephalic and atraumatic.   Eyes:      General: No scleral icterus.  Pulmonary:      Effort: Pulmonary effort is normal. No respiratory distress.   Musculoskeletal:      Cervical back: Neck supple.   Neurological:      Mental Status: He is alert.   Psychiatric:         Behavior: Behavior normal.         Results Review:       I reviewed the patient's new clinical results.      Results from last 7 days   Lab Units 21  0431 21  0535 21  0654 21  0732   SODIUM mmol/L 136 135* 136 136   POTASSIUM mmol/L 3.9 3.4* 3.5 3.5   CHLORIDE mmol/L 94* 90* 89* 89*   CO2 mmol/L 32.2* 36.4* 37.9* 38.0*   BUN mg/dL 42* 44* 39* 36*   CREATININE mg/dL 1.56* 1.64* 1.58* 1.32*   GLUCOSE mg/dL 207* 250* 231* 338*   Estimated Creatinine Clearance: 50.4 mL/min (A) (by C-G formula based on SCr of 1.56 mg/dL (H)).      Results from last 7 days   Lab Units 21  0431 21  0535 21  0654 21  0732 21  0511   CALCIUM mg/dL 8.9 9.2 9.0 8.8 9.1   MAGNESIUM mg/dL  --   --   --   --  1.9         Coag     HbA1C   Lab Results   Component Value Date    HGBA1C 7.60 (H) 2021    HGBA1C 8.30 (H) 2020    HGBA1C 9.50 (H)  12/02/2020     Infection       Radiology(recent) No radiology results for the last day  No results found for: TROPONINT, TROPONINI, BNP  No components found for: TSH;2    aspirin, 81 mg, Oral, Daily  bisacodyl, 10 mg, Rectal, Daily  bumetanide, 2 mg, Oral, BID  carvedilol, 12.5 mg, Oral, BID With Meals  enoxaparin, 40 mg, Subcutaneous, Q24H  insulin glargine, 28 Units, Subcutaneous, QAM  insulin lispro, 0-14 Units, Subcutaneous, TID AC  insulin lispro, 6 Units, Subcutaneous, TID With Meals  polyethylene glycol, 17 g, Oral, Daily  sodium chloride, 10 mL, Intravenous, Q12H  spironolactone, 25 mg, Oral, Daily  tamsulosin, 0.4 mg, Oral, BID       Diet Regular; Consistent Carbohydrate, Low Sodium; No Added Salt      Assessment/Plan      Active Hospital Problems    Diagnosis  POA   • Bacteriuria [R82.71]  Unknown   • Rectal pain [K62.89]  Unknown   • Acute on chronic diastolic (congestive) heart failure (CMS/[facility]) [I50.33]  Yes   • Acute on chronic renal failure (CMS/[facility]) [N17.9, N18.9]  Unknown   • Urinary retention [R33.9]  Yes   • HTN (hypertension) [I10]  Yes   • CSA (central sleep apnea) [G47.31]  Yes   • Acute on chronic diastolic CHF (congestive heart failure) (CMS/[facility]) [I50.33]  Unknown   • CAD (coronary artery disease) [I25.10]  Yes   • Type 2 diabetes mellitus with hyperglycemia, with long-term current use of insulin (CMS/[facility]) [E11.65, Z79.4]  Not Applicable      Resolved Hospital Problems   No resolved problems to display.       Type 2 diabetes mellitus with hyperglycemia, with long-term current use of insulin      · On insulin close to his home meds but plus SSI  · A1c 7.6%, no plans to change regimen at discharge.  He follows with an endocrinologist.    · Continue daily monitoring and make adjustments as needed.   · Insomnia difficult in the setting of also uncontrolled sleep apnea and multiple other co-morbidities. I encouraged him to discuss this with his sleep medicine physician.       NASIR  family      Reviewed records  OK for dc from medicine perspective on home insulin        Jama Benson MD  [facility]  04/21/21  09:39 EDT

## 2021-04-21 NOTE — PROGRESS NOTES
Continued Stay Note  [facility]     Patient Name: Dilip Verma  MRN: [id number]  Today's Date: 4/21/2021    Admit Date: 4/12/2021    Discharge Plan     Row Name 04/21/21 1304       Plan    Plan  [facility]    Patient/Family in Agreement with Plan  yes    Plan Comments  Spoke with [facility] no beds available this week, but can [facility]. CCP called wife, she would like something closer to home. CCP reviewed medicare.gov ratings and locations of SNF, she ask for [facility] referrals to [facility]. CCP spoke with Francesca/Bob able to take at facility and then transfer to [facility] once bed available. Spoke with wife and she would like to proceed [facility] pre[place]cert at [place] until bed available at [facility]. Packet in ccp office. Wero rnccp        Discharge Codes    No documentation.       Expected Discharge Date and Time     Expected Discharge Date Expected Discharge Time    Apr 21, 2021             Abi Bonds RN

## 2021-04-21 NOTE — THERAPY TREATMENT NOTE
"Patient Name: Dilip Verma  : 1950    MRN: [id number]                              Today's Date: 2021       Admit Date: 2021    Visit Dx: No diagnosis found.  Patient Active Problem List   Diagnosis   • Anxiety   • Colon polyp   • Type 2 diabetes mellitus with hyperglycemia, with long-term current use of insulin (CMS/[facility])   • Erectile dysfunction   • Hyperlipidemia   • CAD (coronary artery disease)   • Hx of CABG   • Acute on chronic diastolic CHF (congestive heart failure) (CMS/[facility])   • Hypersomnia due to medical condition   • CSA (central sleep apnea)   • Periodic breathing   • HTN (hypertension)   • History of stroke   • CKD (chronic kidney disease) stage 3, GFR 30-59 ml/min (CMS/[facility])   • Urinary retention   • Acute on chronic renal failure (CMS/[facility])   • Acute on chronic diastolic (congestive) heart failure (CMS/[facility])   • Bacteriuria   • Rectal pain     Past Medical History:   Diagnosis Date   • AMENA (acute kidney injury) (CMS/[facility]) 12/3/2020   • Anxiety    • Chronic diastolic (congestive) heart failure (CMS/[facility])    • Chronic kidney disease     stones- had lithotripsy   • CKD (chronic kidney disease) stage 3, GFR 30-59 ml/min (CMS/[facility]) 2020   • Closed nondisplaced intertrochanteric fracture of left femur (CMS/H[facility]) 2020   • Colon polyp    • Coronary artery disease     CABG 2019   • Diabetes mellitus, type II (CMS/[facility])    • Erectile dysfunction    • H/O bone density study never   • Hyperlipidemia    • Hypersomnia    • Hypertension    • Kidney stone    • Orthostasis    • Periodic breathing    • Routine eye exam    • Sleep apnea     bipap   • Stroke (CMS/[facility])     \"slight stroke\"     Past Surgical History:   Procedure Laterality Date   • CARDIAC CATHETERIZATION N/A 7/15/2019    Procedure: Coronary angiography;  Surgeon: Carrie Price MD;  Location:  INVASIVE LOCATION;  Service: Cardiovascular   • CARDIAC CATHETERIZATION N/A 7/15/2019    Procedure: Left Heart Cath;  " Surgeon: Carrie Price MD;  Location[facility] C[facility];  Service: Cardiovascular   • CARDIAC CATHETERIZATION N/A 7/15/2019    Procedure: Left ventriculography;  Surgeon: Carrie Price MD;  Location[facility] LOC[facility];  Service: Cardiovascular   • CARDIAC CATHETERIZATION  7/15/2019    Procedure: Functional Flow Collins;  Surgeon: Carrie Price MD;  [facility];  Service: Cardiovascular   • CARDIAC CATHETERIZATION N/A 11/6/2019    Procedure: Right and Left Heart Cath;  Surgeon: Mya Smith MD;  Location[facility];  Service: Cardiovascular   • CARDIAC CATHETERIZATION N/A 11/6/2019    Procedure: Coronary angiography;  Surgeon: Mya Smith MD;  [facility];  Service: Cardiovascular   • CATARACT EXTRACTION EXTRACAPSULAR W/ INTRAOCULAR LENS IMPLANTATION Bilateral    • COLONOSCOPY  01/2015    dr jimenez   • CORONARY ARTERY BYPASS GRAFT N/A 7/18/2019    Procedure: INTRAOPERATIVE SHOAIB; STERNOTOMY CORONARY ARTERY BYPASS x 3  USING LEFT INTERNAL MAMMARY ARTERY GRAFT UTILIZING ENDOSCOPICALLY HARVESTED RIGHT GREATER SAPHENOUS VEIN AND PRP.;  Surgeon: Bill Devi MD;  Location: [facility] MAIN OR;  Service: Cardiothoracic   • DENTAL PROCEDURE      3 surgeries inder implants   • FEMUR IM NAILING/RODDING Left 12/3/2020    Procedure: LEFT HIP INTRAMEDULLARY NAIL;  Surgeon: Niraj Ravi MD;  Location: [facility] MAIN OR;  Service: Orthopedic Spine;  Laterality: Left;   • HERNIA REPAIR      inguinal bilaterally   • KIDNEY STONE SURGERY      lithotripsy     General Information     Row Name 04/21/21 1419          Physical Therapy Time and Intention    Document Type  therapy note (daily note)  -PH     Mode of Treatment  physical therapy  -PH     Row Name 04/21/21 1419          Safety Issues, Functional Mobility    Impairments Affecting Function (Mobility)  balance;cognition;endurance/activity tolerance;postural/trunk  control;range of motion (ROM);shortness of breath;strength  -PH     Cognitive Impairments, Mobility Safety/Performance  insight into deficits/self-awareness;awareness, need for assistance;judgment;problem-solving/reasoning;sequencing abilities  -PH       User Key  (r) = Recorded By, (t) = Taken By, (c) = Cosigned By    Initials Name Provider Type    PH Jagruti Yang PTA Physical Therapy Assistant        Mobility     Row Name 04/21/21 1419          Bed Mobility    Bed Mobility  supine-sit;sit-supine  -PH     Rolling Right [place] (Bed Mobility)  contact guard  -PH     Supine-Sit [place] (Bed Mobility)  supervision;verbal cues  -PH     Sit-Supine [place] (Bed Mobility)  verbal cues;minimum assist (75% patient effort);moderate assist (50% patient effort);1 person assist  -PH     Comment (Bed Mobility)  Pt upset that PTA attempted to move his LLE 2/2 pain; pt requested to sit at EOB himself  -PH     Row Name 04/21/21 1419          Sit-Stand Transfer    Sit-Stand [place] (Transfers)  verbal cues;moderate assist (50% patient effort);2 person assist  -PH     Assistive Device (Sit-Stand Transfers)  walker, front-wheeled  -PH     Row Name 04/21/21 1419          Gait/Stairs (Locomotion)    [place] Level (Gait)  minimum assist (75% patient effort);2 person assist;verbal cues  -     Assistive Device (Gait)  walker, front-wheeled  -PH     Distance in Feet (Gait)  20'  -PH     Deviations/Abnormal Patterns (Gait)  antalgic;base of support, narrow;farhana decreased;festinating/shuffling;gait speed decreased;stride length decreased  -PH     Bilateral Gait Deviations  forward flexed posture;heel strike decreased  -PH     Comment (Gait/Stairs)  pt unsteady and requiring repeat of vc to follow directions; pt limited in distance by pain and fatigue.  -PH     Row Name 04/21/21 1413          Mobility    Extremity Weight-bearing Status  left lower extremity  -PH     Left Lower Extremity (Weight-bearing  Status)  weight-bearing as tolerated (WBAT)  -PH       User Key  (r) = Recorded By, (t) = Taken By, (c) = Cosigned By    Initials Name Provider Type     Jagruti Yang PTA Physical Therapy Assistant        Obj/Interventions     Row Name 04/21/21 1422          Motor Skills    Therapeutic Exercise  other (see comments) BLE seated: AP, LAQ  - x 10 reps; freq redirect to task w/ pt talkative  -PH     Row Name 04/21/21 1422          Balance    Balance Assessment  sitting static balance  -PH     Static Sitting Balance  WFL;sitting, edge of bed;supported  -PH     Static Standing Balance  mild impairment;supported;standing  -PH       User Key  (r) = Recorded By, (t) = Taken By, (c) = Cosigned By    Initials Name Provider Type     Jagruti Yang PTA Physical Therapy Assistant        Goals/Plan    No documentation.       Clinical Impression     Row Name 04/21/21 1421          Pain    Additional Documentation  Pain Scale: Numbers Pre/Post-Treatment (Group)  -PH     Row Name 04/21/21 1420          Pain Scale: Numbers Pre/Post-Treatment    Pretreatment Pain Rating  3/10  -PH     Posttreatment Pain Rating  3/10  -PH     Pain Location - Side  Left  -PH     Pain Location - Orientation  lower  -PH     Pain Location  extremity  -PH     Pain Intervention(s)  Ambulation/increased activity;Repositioned  -PH     Row Name 04/21/21 1428          Plan of Care Review    Plan of Care Reviewed With  patient;spouse  -PH     Progress  improving  -PH     Outcome Summary  Pt improving and req less assist for bed mobility - CGA/SV for s>s w/ extra time and Min A for s<s. Pt very talkative and req redirect to task. Pt amb 20' w/ min A x 2 / fww and exhibited unsteadiness w/ no LOB. Pt limited in distance by fatigue and pain in LLE. Pt would benefit from SNF after dc to improve his strength, mobility, and endurance.  -PH     Row Name 04/21/21 1427          Vital Signs    O2 Delivery Pre Treatment  nasal cannula  -PH     O2  Delivery Intra Treatment  nasal cannula  -PH     O2 Delivery Post Treatment  nasal cannula  -PH     Row Name 04/21/21 1425          Positioning and Restraints    Pre-Treatment Position  in bed  -PH     Post Treatment Position  bed  -PH     In Bed  fowlers;call light within reach;encouraged to call for assist;exit alarm on;with family/caregiver  -PH       User Key  (r) = Recorded By, (t) = Taken By, (c) = Cosigned By    Initials Name Provider Type    Jagruti Erickson PTA Physical Therapy Assistant        Outcome Measures     Row Name 04/21/21 1433          How much help from another person do you currently need...    Turning from your back to your side while in flat bed without using bedrails?  3  -PH     Moving from lying on back to sitting on the side of a flat bed without bedrails?  3  -PH     Moving to and from a bed to a chair (including a wheelchair)?  2  -PH     Standing up from a chair using your arms (e.g., wheelchair, bedside chair)?  2  -PH     Climbing 3-5 steps with a railing?  1  -PH     To walk in hospital room?  2  -PH     AM-PAC 6 Clicks Score (PT)  13  -PH     Row Name 04/21/21 1433          Functional Assessment    Outcome Measure Options  AM-PAC 6 Clicks Basic Mobility (PT)  -PH       User Key  (r) = Recorded By, (t) = Taken By, (c) = Cosigned By    Initials Name Provider Type     Jagruti Yang PTA Physical Therapy Assistant        Physical Therapy Education                 Title: PT OT SLP Therapies (Done)     Topic: Physical Therapy (Done)     Point: Mobility training (Done)     Learning Progress Summary           Patient Acceptance, E,D, VU,NR by  at 4/21/2021 1434    Acceptance, E,D, NR by  at 4/18/2021 1702   Significant Other Acceptance, E, VU by  at 4/21/2021 1434                   Point: Home exercise program (Done)     Learning Progress Summary           Patient Acceptance, E,D, VU,NR by  at 4/21/2021 1434    Acceptance, E,D, NR by  at 4/18/2021 1702    Significant Other Acceptance, E, VU by PH at 4/21/2021 1434                   Point: Body mechanics (Done)     Learning Progress Summary           Patient Acceptance, E,D, VU,NR by  at 4/21/2021 1434    Acceptance, E,D, NR by  at 4/18/2021 1702   Significant Other Acceptance, E, VU by  at 4/21/2021 1434                   Point: Precautions (Done)     Learning Progress Summary           Patient Acceptance, E,D, VU,NR by  at 4/21/2021 1434    Acceptance, E, NR by MD at 4/20/2021 1455    Acceptance, E, NR by MD at 4/19/2021 0928    Acceptance, E,D, NR by  at 4/18/2021 1702   Significant Other Acceptance, E, VU by  at 4/21/2021 1434                               User Key     Initials Effective Dates Name Provider Type Discipline     02/05/19 -  Abi Hernandez, PT Physical Therapist PT    MD 04/03/18 -  Venus Blake PT Physical Therapist PT     08/20/19 -  Jagruti Yang PTA Physical Therapy Assistant PT              PT Recommendation and Plan     Plan of Care Reviewed With: patient, spouse  Progress: improving  Outcome Summary: Pt improving and req less assist for bed mobility - CGA/SV for s>s w/ extra time and Min A for s<s. Pt very talkative and req redirect to task. Pt amb 20' w/ min A x 2 / fww and exhibited unsteadiness w/ no LOB. Pt limited in distance by fatigue and pain in LLE. Pt would benefit from SNF after dc to improve his strength, mobility, and endurance.     Time Calculation:   PT Charges     Row Name 04/21/21 1435             Time Calculation    Start Time  1405  -PH      Stop Time  1428  -PH      Time Calculation (min)  23 min  -PH      PT - Next Appointment  04/21/21  -      PT Goal Re-Cert Due Date  04/22/21  -        User Key  (r) = Recorded By, (t) = Taken By, (c) = Cosigned By    Initials Name Provider Type     Jagruti Yang PTA Physical Therapy Assistant        Therapy Charges for Today     Code Description Service Date Service Provider Modifiers  Qty    [id number]  PT THERAPEUTIC ACT EA 15 MIN 4/21/2021 Jagruti Yang, RONY GP 2    [id number]  PT THER SUPP EA 15 MIN 4/21/2021 Jagruti Yang PTA GP 1          PT G-Codes  Outcome Measure Options: AM-PAC 6 Clicks Basic Mobility (PT)  AM-PAC 6 Clicks Score (PT): 13  AM-PAC 6 Clicks Score (OT): 12  Modified [place] Scale: 4 - Moderately severe disability.  Unable to walk without assistance, and unable to attend to own bodily needs without assistance.    Jagruti Yang PTA  4/21/2021

## 2021-04-21 NOTE — PROGRESS NOTES
Access follow up:  Pt sleeping, spoke to LUC Patel who stated that she has not observed or been informed by pt that he is having nightmares nor PTSD episodes. No concerns noted. Amanda stated pt has been slleping on and off all day.  Access will continue to follow.

## 2021-04-22 LAB
ANION GAP SERPL CALCULATED.3IONS-SCNC: 12.2 MMOL/L (ref 5–15)
BUN SERPL-MCNC: 42 MG/DL (ref 8–23)
BUN/CREAT SERPL: 35 (ref 7–25)
CALCIUM SPEC-SCNC: 9.1 MG/DL (ref 8.6–10.5)
CHLORIDE SERPL-SCNC: 96 MMOL/L (ref 98–107)
CO2 SERPL-SCNC: 28.8 MMOL/L (ref 22–29)
CREAT SERPL-MCNC: 1.2 MG/DL (ref 0.76–1.27)
GFR SERPL CREATININE-BSD FRML MDRD: 60 ML/MIN/1.73
GLUCOSE BLDC GLUCOMTR-MCNC: 168 MG/DL (ref 70–130)
GLUCOSE BLDC GLUCOMTR-MCNC: 172 MG/DL (ref 70–130)
GLUCOSE BLDC GLUCOMTR-MCNC: 224 MG/DL (ref 70–130)
GLUCOSE BLDC GLUCOMTR-MCNC: 260 MG/DL (ref 70–130)
GLUCOSE SERPL-MCNC: 215 MG/DL (ref 65–99)
POTASSIUM SERPL-SCNC: 4.1 MMOL/L (ref 3.5–5.2)
SODIUM SERPL-SCNC: 137 MMOL/L (ref 136–145)

## 2021-04-22 PROCEDURE — 97530 THERAPEUTIC ACTIVITIES: CPT

## 2021-04-22 PROCEDURE — 63710000001 INSULIN GLARGINE PER 5 UNITS: Performed by: INTERNAL MEDICINE

## 2021-04-22 PROCEDURE — 99231 SBSQ HOSP IP/OBS SF/LOW 25: CPT | Performed by: NURSE PRACTITIONER

## 2021-04-22 PROCEDURE — 25010000002 ENOXAPARIN PER 10 MG: Performed by: INTERNAL MEDICINE

## 2021-04-22 PROCEDURE — 82962 GLUCOSE BLOOD TEST: CPT

## 2021-04-22 PROCEDURE — 63710000001 INSULIN LISPRO (HUMAN) PER 5 UNITS: Performed by: INTERNAL MEDICINE

## 2021-04-22 PROCEDURE — 80048 BASIC METABOLIC PNL TOTAL CA: CPT | Performed by: INTERNAL MEDICINE

## 2021-04-22 RX ADMIN — INSULIN LISPRO 8 UNITS: 100 INJECTION, SOLUTION INTRAVENOUS; SUBCUTANEOUS at 11:44

## 2021-04-22 RX ADMIN — INSULIN GLARGINE 28 UNITS: 100 INJECTION, SOLUTION SUBCUTANEOUS at 06:51

## 2021-04-22 RX ADMIN — ENOXAPARIN SODIUM 40 MG: 40 INJECTION SUBCUTANEOUS at 15:00

## 2021-04-22 RX ADMIN — ASPIRIN 81 MG: 81 TABLET, COATED ORAL at 08:16

## 2021-04-22 RX ADMIN — Medication 3 MG: at 20:24

## 2021-04-22 RX ADMIN — ACETAMINOPHEN 650 MG: 325 TABLET, FILM COATED ORAL at 09:26

## 2021-04-22 RX ADMIN — CARVEDILOL 12.5 MG: 12.5 TABLET, FILM COATED ORAL at 08:16

## 2021-04-22 RX ADMIN — INSULIN LISPRO 3 UNITS: 100 INJECTION, SOLUTION INTRAVENOUS; SUBCUTANEOUS at 17:44

## 2021-04-22 RX ADMIN — ACETAMINOPHEN 650 MG: 325 TABLET, FILM COATED ORAL at 20:25

## 2021-04-22 RX ADMIN — ACETAMINOPHEN 650 MG: 325 TABLET, FILM COATED ORAL at 01:06

## 2021-04-22 RX ADMIN — BUMETANIDE 2 MG: 2 TABLET ORAL at 17:44

## 2021-04-22 RX ADMIN — BUMETANIDE 2 MG: 2 TABLET ORAL at 08:16

## 2021-04-22 RX ADMIN — CARVEDILOL 12.5 MG: 12.5 TABLET, FILM COATED ORAL at 17:44

## 2021-04-22 RX ADMIN — TAMSULOSIN HYDROCHLORIDE 0.4 MG: 0.4 CAPSULE ORAL at 08:16

## 2021-04-22 RX ADMIN — INSULIN LISPRO 5 UNITS: 100 INJECTION, SOLUTION INTRAVENOUS; SUBCUTANEOUS at 06:54

## 2021-04-22 RX ADMIN — ACETAMINOPHEN 650 MG: 325 TABLET, FILM COATED ORAL at 14:26

## 2021-04-22 RX ADMIN — SPIRONOLACTONE 25 MG: 25 TABLET ORAL at 08:16

## 2021-04-22 RX ADMIN — SODIUM CHLORIDE, PRESERVATIVE FREE 10 ML: 5 INJECTION INTRAVENOUS at 20:25

## 2021-04-22 RX ADMIN — SODIUM CHLORIDE, PRESERVATIVE FREE 10 ML: 5 INJECTION INTRAVENOUS at 08:18

## 2021-04-22 RX ADMIN — TAMSULOSIN HYDROCHLORIDE 0.4 MG: 0.4 CAPSULE ORAL at 20:24

## 2021-04-22 RX ADMIN — POLYETHYLENE GLYCOL 3350 17 G: 17 POWDER, FOR SOLUTION ORAL at 08:16

## 2021-04-22 NOTE — THERAPY TREATMENT NOTE
"Patient Name: Dilip Verma  : 1950    MRN: [id number]                              Today's Date: 2021       Admit Date: 2021    Visit Dx: No diagnosis found.  Patient Active Problem List   Diagnosis   • Anxiety   • Colon polyp   • Type 2 diabetes mellitus with hyperglycemia, with long-term current use of insulin (CMS/[facility])   • Erectile dysfunction   • Hyperlipidemia   • CAD (coronary artery disease)   • Hx of CABG   • Acute on chronic diastolic CHF (congestive heart failure) (CMS/[facility])   • Hypersomnia due to medical condition   • CSA (central sleep apnea)   • Periodic breathing   • HTN (hypertension)   • History of stroke   • CKD (chronic kidney disease) stage 3, GFR 30-59 ml/min (CMS/[facility])   • Urinary retention   • Acute on chronic renal failure (CMS/[facility])   • Acute on chronic diastolic (congestive) heart failure (CMS/[facility])   • Bacteriuria   • Rectal pain     Past Medical History:   Diagnosis Date   • AMENA (acute kidney injury) (CMS/[facility]) 12/3/2020   • Anxiety    • Chronic diastolic (congestive) heart failure (CMS/[facility])    • Chronic kidney disease     stones- had lithotripsy   • CKD (chronic kidney disease) stage 3, GFR 30-59 ml/min (CMS/[facility]) 2020   • Closed nondisplaced intertrochanteric fracture of left femur (CMS/HCC) 2020   • Colon polyp    • Coronary artery disease     CABG 2019   • Diabetes mellitus, type II (CMS/[facility])    • Erectile dysfunction    • H/O bone density study never   • Hyperlipidemia    • Hypersomnia    • Hypertension    • Kidney stone    • Orthostasis    • Periodic breathing    • Routine eye exam    • Sleep apnea     bipap   • Stroke (CMS/[facility])     \"slight stroke\"     Past Surgical History:   Procedure Laterality Date   • CARDIAC CATHETERIZATION N/A 7/15/2019    Procedure: Coronary angiography;  Surgeon: Carrie Price MD;  Location: [facility] INVASIVE LOCATION;  Service: Cardiovascular   • CARDIAC CATHETERIZATION N/A 7/15/2019    Procedure: Left Heart Cath;  " Surgeon: Carrie Price MD;  [facility];  Service: Cardiovascular   • CARDIAC CATHETERIZATION N/A 7/15/2019    Procedure: Left ventriculography;  Surgeon: Carrie Price MD;  [facility]IVE LOC[facility];  Service: Cardiovascular   • CARDIAC CATHETERIZATION  7/15/2019    Procedure: Functional Flow Zap;  Surgeon: Carrie Price MD;  [facility]VASIVE LOC[facility];  Service: Cardiovascular   • CARDIAC CATHETERIZATION N/A 11/6/2019    Procedure: Right and Left Heart Cath;  Surgeon: Mya Smith MD;  [facility] INVASIVE LOCATION;  Service: Cardiovascular   • CARDIAC CATHETERIZATION N/A 11/6/2019    Procedure: Coronary angiography;  Surgeon: Mya Smith MD;  [facility]IVE LOC[facility];  Service: Cardiovascular   • CATARACT EXTRACTION EXTRACAPSULAR W/ INTRAOCULAR LENS IMPLANTATION Bilateral    • COLONOSCOPY  01/2015    dr [email or web address]   • CORONARY ARTERY BYPASS GRAFT N/A 7/18/2019    Procedure: INTRAOPERATIVE SHOAIB; STERNOTOMY CORONARY ARTERY BYPASS x 3  USING LEFT INTERNAL MAMMARY ARTERY GRAFT UTILIZING ENDOSCOPICALLY HARVESTED RIGHT GREATER SAPHENOUS VEIN AND PRP.;  Surgeon: Bill Devi MD;  Location: [facility] OR;  Service: Cardiothoracic   • DENTAL PROCEDURE      3 surgeries inder implants   • FEMUR IM NAILING/RODDING Left 12/3/2020    Procedure: LEFT HIP INTRAMEDULLARY NAIL;  Surgeon: Niraj Ravi MD;  Location: [facility] MAIN OR;  Service: Orthopedic Spine;  Laterality: Left;   • HERNIA REPAIR      inguinal bilaterally   • KIDNEY STONE SURGERY      lithotripsy     General Information     Row Name 04/22/21 1312          Physical Therapy Time and Intention    Mode of Treatment  physical therapy  -PH     Row Name 04/22/21 1312          Safety Issues, Functional Mobility    Impairments Affecting Function (Mobility)  balance;cognition;endurance/activity tolerance;strength;pain  -PH       User Key  (r) = Recorded By, (t) = Taken By, (c)  = Cosigned By    Initials Name Provider Type    PH Jagruti Yang PTA Physical Therapy Assistant        Mobility     Row Name 04/22/21 1312          Bed Mobility    Bed Mobility  supine-sit;sit-supine;scooting/bridging  -PH     Scooting/Bridging [place] (Bed Mobility)  dependent (less than 25% patient effort);2 person assist;verbal cues;nonverbal cues (demo/gesture)  -PH     Supine-Sit [place] (Bed Mobility)  supervision;verbal cues  -PH     Sit-Supine [place] (Bed Mobility)  verbal cues;minimum assist (75% patient effort);2 person assist  -PH     Assistive Device (Bed Mobility)  bed rails;head of bed elevated;draw sheet  -PH     Comment (Bed Mobility)  Pt talking in spite of attempts to redirect by PTA student and wife w/ pt req more assist back to bed today. Pt talkin freq throughout session w/ continual attempts to redirect  -PH     Row Name 04/22/21 1312          Transfers    Comment (Transfers)  STS x 2; vc for B hand placement, upright posture and to widen RAZIA  -PH     Row Name 04/22/21 1312          Sit-Stand Transfer    Sit-Stand [place] (Transfers)  minimum assist (75% patient effort);verbal cues;nonverbal cues (demo/gesture);contact guard;2 person assist  -PH     Assistive Device (Sit-Stand Transfers)  walker, 4-wheeled  -PH     Row Name 04/22/21 1312          Gait/Stairs (Locomotion)    [place] Level (Gait)  minimum assist (75% patient effort);verbal cues;1 person assist;2 person assist;nonverbal cues (demo/gesture)  -PH     Assistive Device (Gait)  walker, 4-wheeled  -PH     Distance in Feet (Gait)  20' x 2  -PH     Deviations/Abnormal Patterns (Gait)  base of support, narrow;antalgic;festinating/shuffling;gait speed decreased;farhana decreased  -PH     Bilateral Gait Deviations  forward flexed posture;heel strike decreased  -PH     Left Sided Gait Deviations  weight shift ability decreased  -PH     Comment (Gait/Stairs)  pt unsteady w/ vc for upright osture, to remain  close and centered w/in fww; pt req a 4 min rest after each gait trial w/ pt limited in distance by fatigue  -PH     Row Name 04/22/21 1312          Mobility    Extremity Weight-bearing Status  left lower extremity  -PH     Left Lower Extremity (Weight-bearing Status)  weight-bearing as tolerated (WBAT)  -PH       User Key  (r) = Recorded By, (t) = Taken By, (c) = Cosigned By    Initials Name Provider Type     Jagruti Yang PTA Physical Therapy Assistant        Obj/Interventions     Row Name 04/22/21 1320          Motor Skills    Therapeutic Exercise  other (see comments) pt refused TE 2/2 fatigue  -PH     Row Name 04/22/21 1320          Balance    Balance Assessment  sitting static balance;standing static balance  -PH     Static Sitting Balance  supported  -PH     Static Standing Balance  mild impairment;supported  -PH     Comment, Balance  pt SV for sit bal of approx 8 min total; pt cga/min A x 2 w/ use of rwx for stand bal of approx 10 sec  -PH       User Key  (r) = Recorded By, (t) = Taken By, (c) = Cosigned By    Initials Name Provider Type     Jagruti Yang PTA Physical Therapy Assistant        Goals/Plan    No documentation.       Clinical Impression     Row Name 04/22/21 1321          Pain    Additional Documentation  Pain Scale: Numbers Pre/Post-Treatment (Group)  -PH     [facility] Name 04/22/21 1321          Pain Scale: Numbers Pre/Post-Treatment    Pretreatment Pain Rating  5/10  -PH     Posttreatment Pain Rating  6/10  -PH     Pain Location - Side  Left  -PH     Pain Location - Orientation  lower  -PH     Pain Location  extremity  -PH     Pain Intervention(s)  Repositioned  -PH     Row Name 04/22/21 1321          Plan of Care Review    Plan of Care Reviewed With  patient  -PH     Progress  improving  -PH     Outcome Summary  Pt improving w/ amb of 20' x 2 req CGA/min A and use of rwx. Pt unsteady and amb'ing to L of rwx w/ vc to remain w/in rwx. Pt limited in distance by fatigue and req  "4 min rest after each trial. Pt talkative and req freq redirect to task and was returned to bed req more assist - min A x 2 -  2/2 not being able to redirect to s<s. Pt refused TE stating, \"1 thing at a time\" after amb. Pt's wife states pt is to dc to SNF later today.  -PH     Row Name 04/22/21 1321          Vital Signs    O2 Delivery Pre Treatment  nasal cannula  -PH     O2 Delivery Intra Treatment  supplemental O2  -PH     O2 Delivery Post Treatment  nasal cannula  -PH     Recovery Time  O2 sats remained about 91% during session.  -PH     Row Name 04/22/21 1321          Positioning and Restraints    Pre-Treatment Position  in bed  -PH     Post Treatment Position  bed  -PH     In Bed  fowlers;call light within reach;encouraged to call for assist;exit alarm on;with family/caregiver  -PH       User Key  (r) = Recorded By, (t) = Taken By, (c) = Cosigned By    Initials Name Provider Type    Jagruti Erickson PTA Physical Therapy Assistant        Outcome Measures     Row Name 04/22/21 1356          How much help from another person do you currently need...    Turning from your back to your side while in flat bed without using bedrails?  3  -PH     Moving from lying on back to sitting on the side of a flat bed without bedrails?  3  -PH     Moving to and from a bed to a chair (including a wheelchair)?  2  -PH     Standing up from a chair using your arms (e.g., wheelchair, bedside chair)?  2  -PH     Climbing 3-5 steps with a railing?  1  -PH     To walk in hospital room?  2  -PH     AM-PAC 6 Clicks Score (PT)  13  -PH     Row Name 04/22/21 1356          Functional Assessment    Outcome Measure Options  AM-PAC 6 Clicks Basic Mobility (PT)  -PH       User Key  (r) = Recorded By, (t) = Taken By, (c) = Cosigned By    Initials Name Provider Type    Jagruti Erickson PTA Physical Therapy Assistant        Physical Therapy Education                 Title: PT OT SLP Therapies (Done)     Topic: Physical Therapy " (Done)     Point: Mobility training (Done)     Learning Progress Summary           Patient Acceptance, E,D, VU,NR by  at 4/22/2021 1356    Acceptance, E,TB,D, VU,DU,NR by  at 4/21/2021 2059    Acceptance, E,D, VU,NR by  at 4/21/2021 1434    Acceptance, E,D, NR by  at 4/18/2021 1702   Significant Other Acceptance, E, VU by  at 4/21/2021 1434                   Point: Home exercise program (Done)     Learning Progress Summary           Patient Acceptance, E,TB,D, VU,DU,NR by  at 4/21/2021 2059    Acceptance, E,D, VU,NR by  at 4/21/2021 1434    Acceptance, E,D, NR by  at 4/18/2021 1702   Significant Other Acceptance, E, VU by  at 4/21/2021 1434                   Point: Body mechanics (Done)     Learning Progress Summary           Patient Acceptance, E,D, VU,NR by  at 4/22/2021 1356    Acceptance, E,TB,D, VU,DU,NR by  at 4/21/2021 2059    Acceptance, E,D, VU,NR by  at 4/21/2021 1434    Acceptance, E,D, NR by  at 4/18/2021 1702   Significant Other Acceptance, E, VU by  at 4/21/2021 1434                   Point: Precautions (Done)     Learning Progress Summary           Patient Acceptance, E,D, VU,NR by  at 4/22/2021 1356    Acceptance, E,TB,D, VU,DU,NR by  at 4/21/2021 2059    Acceptance, E,D, VU,NR by  at 4/21/2021 1434    Acceptance, E, NR by MD at 4/20/2021 1455    Acceptance, E, NR by MD at 4/19/2021 0928    Acceptance, E,D, NR by  at 4/18/2021 1702   Significant Other Acceptance, E, VU by  at 4/21/2021 1434                               User Key     Initials Effective Dates Name Provider Type Discipline     02/05/19 -  Abi Hernandez, PT Physical Therapist PT    MD 04/03/18 -  Hayden, Venus, PT Physical Therapist PT     04/06/17 -  Zuri Fleming, RN Registered Nurse Nurse    PH 08/20/19 -  Jagruti Yang PTA Physical Therapy Assistant PT              PT Recommendation and Plan     Plan of Care Reviewed With: patient  Progress: improving  Outcome Summary: Pt  "improving w/ amb of 20' x 2 req CGA/min A and use of rwx. Pt unsteady and amb'ing to L of rwx w/ vc to remain w/in rwx. Pt limited in distance by fatigue and req 4 min rest after each trial. Pt talkative and req freq redirect to task and was returned to bed req more assist - min A x 2 -  2/2 not being able to redirect to s<s. Pt refused TE stating, \"1 thing at a time\" after amb. Pt's wife states pt is to dc to SNF later today.     Time Calculation:   PT Charges     Row Name 04/22/21 1357             Time Calculation    Start Time  1241  -PH      Stop Time  1306  -PH      Time Calculation (min)  25 min  -PH      PT - Next Appointment  04/22/21  -      PT Goal Re-Cert Due Date  04/23/21  -        User Key  (r) = Recorded By, (t) = Taken By, (c) = Cosigned By    Initials Name Provider Type     Jagruti Yang PTA Physical Therapy Assistant        Therapy Charges for Today     Code Description Service Date Service Provider Modifiers Qty    85180721249  PT THERAPEUTIC ACT EA 15 MIN 4/21/2021 Jagruti Yang, PTA GP 2    [id number]  PT THER SUPP EA 15 MIN 4/21/2021 Jagruti Yang, PTA GP 1    [id number]  PT THERAPEUTIC ACT EA 15 MIN 4/22/2021 Jagruti Yang, RONY GP 2          PT G-Codes  Outcome Measure Options: AM-PAC 6 Clicks Basic Mobility (PT)  AM-PAC 6 Clicks Score (PT): 13  AM-PAC 6 Clicks Score (OT): 12  Modified Boundary Scale: 4 - Moderately severe disability.  Unable to walk without assistance, and unable to attend to own bodily needs without assistance.    Jagruti Yang PTA  4/22/2021    "

## 2021-04-22 NOTE — PROGRESS NOTES
PROGRESS NOTE      Name: Dilip Verma ADMIT: 2021   : 1950  PCP: Dakota Avila MD    MRN: [id number] LOS: 8 days   AGE/SEX: 71 y.o. male  ROOM: [address] .    Patient seen and examined.  Renal function stable.         INTERVAL History      Patient is diuresing well  No labs this morning overall complaints of lower extremity pain but no new issues      REVIEW OF SYSTEMS  Constitutional: + weakness.  HEENT:           No headache, otalgia, itchy eyes, nasal discharge or sore throat.  Cardiac:           No chest pain, orthopnea or PND. +dyspnea  Chest:              No cough, phlegm or wheezing.  Abdomen:        No abdominal pain, nausea or vomiting.  Neuro:             No focal weakness, abnormal movements or seizure-like activity.  :                  History of difficulty urinating  Ext:                  + swelling (improving)  ROS was otherwise negative except as mentioned in the [place].       Medications Prior to Admission   Medication Sig Dispense Refill Last Dose   • aspirin 81 MG EC tablet Take 81 mg by mouth Daily.   2021 at Unknown time   • carvedilol (COREG) 12.5 MG tablet Take one tablet twice daily with meals 180 tablet 2 2021 at Unknown time   • Cholecalciferol (Vitamin D) 125 MCG (5000 UT) capsule capsule Take 5,000 Units by mouth Daily.   2021 at Unknown time   • furosemide (LASIX) 40 MG tablet Take 1.5 tablets by mouth 2 (Two) Times a Day.   2021 at Unknown time   • hydrALAZINE (APRESOLINE) 25 MG tablet Take 1 tablet by mouth 2 (Two) Times a Day As Needed (for SBP > 160). 90 tablet 0 2021 at Unknown time   • Insulin Glargine, 2 Unit Dial, (TOUJEO) 300 UNIT/ML solution pen-injector injection Inject 28 Units under the skin into the appropriate area as directed Daily.   2021 at Unknown time   • insulin lispro (humaLOG) 100 UNIT/ML injection Inject 0-14 Units under the skin into the appropriate area as directed 3 (Three) Times a Day Before Meals.   "12 4/11/2021 at Unknown time   • melatonin 3 MG tablet Take 9 mg by mouth Every Night.   Patient Taking Differently at Unknown time   • Oxymetazoline HCl (AFRIN NASAL SPRAY NA) into the nostril(s) as directed by provider.   4/11/2021 at Unknown time   • tamsulosin (FLOMAX) 0.4 MG capsule 24 hr capsule TAKE 2 CAPSULES BY MOUTH AT BEDTIME  (Patient taking differently: Take 1 capsule by mouth 2 (Two) Times a Day.) 60 capsule 0 Patient Taking Differently at Unknown time   • sennosides-docusate (senna-docusate sodium) 8.6-50 MG per tablet Take 2 tablets by mouth 2 (two) times a day. 56 tablet 0 Unknown at Unknown time     Allergies:  Ace inhibitors, Clonidine derivatives, Losartan, Amlodipine, Ativan [lorazepam], Xanax [alprazolam], Minoxidil, and Tetracycline     Objective   PHYSICAL EXAM  Trace lower extremity edema  /68 (BP Location: Left arm, Patient Position: Lying)   Pulse 62   Temp 97.4 °F (36.3 °C) (Oral)   Resp 18   Ht 175.3 cm (69\")   Wt 83.2 kg (183 lb 7.9 oz)   SpO2 94%   BMI 27.10 kg/m²   Intake/Output last 3 shifts:  I/O last 3 completed shifts:  In: 480 [P.O.:480]  Out: 3075 [Urine:3075]  Intake/Output this shift:  No intake/output data recorded.  General:  Chronically ill appearing,  male in no acute distress.    Head:      Normocephalic and atraumatic.    Eyes:      PERRL/EOM intact, conjunctiva and sclera clear with out nystagmus.    Neck:      No masses, thyromegaly,  trachea central with normal respiratory effort and PMI displaced laterally  Lungs:    Clear bilaterally to auscultation.    Heart:      Regular rate and rhythm, no murmur no gallop  Abd:        Soft, nontender, not distended, bowel sounds positive, no shifting dullness.  Msk:        No deformity or scoliosis noted of thoracic or lumbar spine.    Pulses:   Pulses normal in all 4 extremities.    Extr:        No cyanosis or clubbing, trace edema.    Neuro:    No focal deficits.   alert oriented x3  Skin:       Intact without " lesions or rashes.    Psych:    Alert and cooperative; normal mood and affect; normal attention span and concentration.            LABS:    CBC        BMP   Results from last 7 days   Lab Units 04/21/21  0431 04/20/21  0535 04/19/21  0654 04/18/21  0732 04/17/21  0722 04/16/21  0511 04/15/21  0859   SODIUM mmol/L 136 135* 136 136 136 138 140   POTASSIUM mmol/L 3.9 3.4* 3.5 3.5 3.9 3.4* 3.7   CHLORIDE mmol/L 94* 90* 89* 89* 89* 93* 96*   CO2 mmol/L 32.2* 36.4* 37.9* 38.0* 37.5* 36.2* 32.9*   BUN mg/dL 42* 44* 39* 36* 32* 33* 31*   CREATININE mg/dL 1.56* 1.64* 1.58* 1.32* 1.44* 1.27 1.43*   GLUCOSE mg/dL 207* 250* 231* 338* 257* 207* 257*   MAGNESIUM mg/dL  --   --   --   --   --  1.9  --      CMP   Results from last 7 days   Lab Units 04/21/21  0431 04/20/21  0535 04/19/21  0654 04/18/21  0732 04/17/21  0722 04/16/21  0511 04/15/21  0859   SODIUM mmol/L 136 135* 136 136 136 138 140   POTASSIUM mmol/L 3.9 3.4* 3.5 3.5 3.9 3.4* 3.7   CHLORIDE mmol/L 94* 90* 89* 89* 89* 93* 96*   CO2 mmol/L 32.2* 36.4* 37.9* 38.0* 37.5* 36.2* 32.9*   BUN mg/dL 42* 44* 39* 36* 32* 33* 31*   CREATININE mg/dL 1.56* 1.64* 1.58* 1.32* 1.44* 1.27 1.43*   GLUCOSE mg/dL 207* 250* 231* 338* 257* 207* 257*     ABG      Imaging Results (Last 24 Hours)     ** No results found for the last 24 hours. **          Results for orders placed during the hospital encounter of 04/02/21    Adult Transthoracic Echo Complete w/ Color, Spectral and Contrast if Necessary Per Protocol    Interpretation Summary  · Left ventricular ejection fraction appears to be 51 - 55%. Left ventricular systolic function is low normal. Normal left ventricular cavity size noted. Left ventricular wall thickness is consistent with moderate concentric hypertrophy. All left ventricular wall segments contract normally. Left ventricular diastolic function is consistent with (grade II w/high LAP) pseudonormalization.  · Calculated right ventricular systolic pressure from tricuspid  regurgitation is 70.1 mmHg.      Assessment/Plan   Assessment:        Type 2 diabetes mellitus with hyperglycemia, with long-term current use of insulin (CMS/[facility])    CAD (coronary artery disease)    Acute on chronic diastolic CHF (congestive heart failure) (CMS/[facility])    CSA (central sleep apnea)    HTN (hypertension)    Urinary retention    Acute on chronic renal failure (CMS/[facility])    Acute on chronic diastolic (congestive) heart failure (CMS/[facility])    Bacteriuria    Rectal pain    · Acute kidney injury, with CKD, admission creatinine 1.51 on 4/12, creatinine 4/9 1.72, cr 1.12 on 3/19 Urine studies not available. AMENA possible pre-renal with CHF, fluid overload on exam, recent UTIs, less likely obstruction with light catheter in place draining urine.   · Chronic kidney disease, stage III, baseline creatinine noted around 1.3 on 08/2020. CKD most likely secondary to atherosclerotic disease.   · Acid/base stable  · Electrolytes  · Volume, overload on exam   · Echocardiogram  · Left ventricular ejection fraction appears to be 51 - 55%. Left ventricular systolic function is low normal. Normal left ventricular cavity size noted. Left ventricular wall thickness is consistent with moderate concentric hypertrophy. All left ventricular wall segments contract normally. Left ventricular diastolic function is consistent with (grade II w/high LAP) pseudonormalization.  · Right ventricular systolic pressure from tricuspid regurgitation is 70.1 mmHg  · BP, hypertensive - expect some improvement with aggressive diuresis   · Anemia  · Acute on chronic diastolic CHF  · GNB UTI  · Chronic right sided CHF, severe PHTN, RVSP 70mmHg  · CAD s/p CABG  · COCO,CSA on BiPap  · IDDM2  · Urinary retention, history of UTI  · History of orthopedic fractures, non-weight bearing status     Plan:      ? Renal function is stable.    Creatinine at baseline 1.56, potassium is stable.  No labs this morning.  ? Volume is much better, edema has  resolved.  ? Tolerating po bumex 2 bid well  ? continue aldactone 25 mg po daily  ? Urology input appreciated  ? I had again extensive d/w pt and wife about intermittent self cath, pt still not ready  ? UOP fair  ? Strict I/O  ? Daily weights  ? Avoid nephrotoxic medications  ? Patient with indwelling light catheter  ? Discussed with patient and spouse at bedside.   ? Cardiology plan noted.  ? Okay to discharge from renal standpoint  ? Will need outpatient follow up with our office in 2-3 weeks, office to call and set up appointment.   ? Discharge on Bumex 2mg PO BID.   ? Aldactone 25 mg p.o. daily.  ? Thank you for allowing me to participate in Mr. Verma' care.         Cristóbal Mayen MD  [facility]  4/22/2021  07:24 EDT

## 2021-04-22 NOTE — PLAN OF CARE
Goal Outcome Evaluation:   VSS, patient continues to c/o of nerve pain, unable to sleep, PRN Tylenol given, wife at bedside, up to [facility] with assit x1, plan to D/C to rehab, will CTM
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
## 2021-04-22 NOTE — PROGRESS NOTES
"    Patient Name: Dilip Verma  :1950  71 y.o.      Patient Care Team:  Dakota Avila MD as PCP - General (Family Medicine)  Morris Cai MD as Consulting Physician (Sleep Medicine)  Michaela Hylton MD as Consulting Physician (Cardiology)  Hardy Banerjee MD as Consulting Physician (Ophthalmology)  Chula Christianson MD as Consulting Physician (Ophthalmology)  Jason Sherman MD (Endocrinology)  Perla Mayen MD as Consulting Physician (Nephrology)  Federico Hebert MD as Consulting Physician (Pulmonary Disease)  Johan Dillard, LUC as Ambulatory  (Population Health)  Niraj Ravi MD as Consulting Physician (Orthopedic Surgery)  Papa Velasco MD as Consulting Physician (Urology)    Chief Complaint: follow up heart failure, volume overload.     Interval History: Patient and wife were both sleeping when I entered the room. I did not wake them as sleep has been a very big issue for both of them and I wanted to encourage rest.     Objective   Vital Signs  Temp:  [97.4 °F (36.3 °C)-98 °F (36.7 °C)] 97.4 °F (36.3 °C)  Heart Rate:  [56-62] 62  Resp:  [18] 18  BP: (113-138)/(59-68) 138/68    Intake/Output Summary (Last 24 hours) at 2021 1113  Last data filed at 2021 0915  Gross per 24 hour   Intake 480 ml   Output 1775 ml   Net -1295 ml     Flowsheet Rows      First Filed Value   Admission Height  175.3 cm (69\") Documented at 2021 1335   Admission Weight  105 kg (231 lb 7.7 oz) Documented at 2021 1335          Physical Exam:   General Appearance:    Asleep.   Lungs:     Normal respiratory effort and rate.      Heart:    Regular rhythm and normal rate   Chest Wall:    No abnormalities observed   Abdomen:          Extremities:   no cyanosis, clubbing, + edema.  No marked joint deformities.       Results Review:    Results from last 7 days   Lab Units 21  0644   SODIUM mmol/L 137   POTASSIUM mmol/L 4.1   CHLORIDE mmol/L 96*   CO2 mmol/L " 28.8   BUN mg/dL 42*   CREATININE mg/dL 1.20   GLUCOSE mg/dL 215*   CALCIUM mg/dL 9.1                 Results from last 7 days   Lab Units 04/16/21  0511   MAGNESIUM mg/dL 1.9                   Medication Review:   aspirin, 81 mg, Oral, Daily  bisacodyl, 10 mg, Rectal, Daily  bumetanide, 2 mg, Oral, BID  carvedilol, 12.5 mg, Oral, BID With Meals  enoxaparin, 40 mg, Subcutaneous, Q24H  insulin glargine, 28 Units, Subcutaneous, QAM  insulin lispro, 0-14 Units, Subcutaneous, TID AC  insulin lispro, 6 Units, Subcutaneous, TID With Meals  polyethylene glycol, 17 g, Oral, Daily  sodium chloride, 10 mL, Intravenous, Q12H  spironolactone, 25 mg, Oral, Daily  tamsulosin, 0.4 mg, Oral, BID              Assessment/Plan   1. Acute on chronic diastolic heart failure  2. Chronic right sided heart failure with severe pulmonary hypertension RVSP 70 mmHg.   3. Coronary artery disease status post CABG  4. Damion on chronic kidney disease, stabilizing. Appreciate nephrology assistance.   5. Combined COCO/CSA - he does not tolerate much time on bipap at night. Wife thinks its the mask.   6. Diabetes mellitus type II, with circulatory complication. Insulin dependent. IM following.  7. History of orthopedic fractures , immobility - he is allowed to be weight baring. Encouraged continued work with physical therapy. CCP working on rehab placement. Wife to discuss with patient later today.  8. Abnormal urine culture with ESBL. ID saw. Likely colonization. No ABX.   9. Insomnia/axiety/PTSD - wife questioning about medications. Unfortunately, sedating medications would be too high risk of respiratory depression especially with is noncompliance with bipap. Access center saw.       Nearing max benefit from inpatient therapy. CCP working on discharge placment. DC when bed available.   Pre cert pending. Hopefully able to go later today.     LEIDY Benoit  [facility]  04/22/21  11:13 EDT

## 2021-04-22 NOTE — PROGRESS NOTES
"  Infectious Diseases Progress Note    Tino Nagel MD     [facility]  Los: 8 days  Patient Identification:  Name: Dilip Verma  Age: 71 y.o.  Sex: male  :  1950  MRN: [id number]         Primary Care Physician: Dakota Avila MD            Subjective: Continues to feel well denies any fever chills or burning in urination or pain at the Hopkins catheter insertion site.  No evidence of confusion or disorientation.  Interval History: See consultation note.    Objective:    Scheduled Meds:aspirin, 81 mg, Oral, Daily  bisacodyl, 10 mg, Rectal, Daily  bumetanide, 2 mg, Oral, BID  carvedilol, 12.5 mg, Oral, BID With Meals  enoxaparin, 40 mg, Subcutaneous, Q24H  insulin glargine, 28 Units, Subcutaneous, QAM  insulin lispro, 0-14 Units, Subcutaneous, TID AC  insulin lispro, 6 Units, Subcutaneous, TID With Meals  polyethylene glycol, 17 g, Oral, Daily  sodium chloride, 10 mL, Intravenous, Q12H  spironolactone, 25 mg, Oral, Daily  tamsulosin, 0.4 mg, Oral, BID      Continuous Infusions:     Vital signs in last 24 hours:  Temp:  [97.7 °F (36.5 °C)-98 °F (36.7 °C)] 98 °F (36.7 °C)  Heart Rate:  [56-62] 62  Resp:  [18] 18  BP: (113-138)/(59-66) 138/66    Intake/Output:    Intake/Output Summary (Last 24 hours) at 2021 0648  Last data filed at 2021 0604  Gross per 24 hour   Intake 480 ml   Output 1875 ml   Net -1395 ml       Exam:  /66 (BP Location: Left arm, Patient Position: Lying)   Pulse 62   Temp 98 °F (36.7 °C) (Oral)   Resp 18   Ht 175.3 cm (69\")   Wt 83.2 kg (183 lb 7.9 oz)   SpO2 94%   BMI 27.10 kg/m²   Patient is examined using the personal protective equipment as per guidelines from infection control for this particular patient as enacted.  Hand washing was performed before and after patient interaction.  General Appearance:    Alert, cooperative, no distress, AAOx3                          Head:    Normocephalic, without obvious abnormality, atraumatic                       "     Eyes:    PERRL, conjunctivae/corneas clear, EOM's intact, both eyes                         Throat:   Lips, tongue, gums normal; oral mucosa pink and moist                           Neck:   Supple, symmetrical, trachea midline, no JVD                         Lungs:    Clear to auscultation bilaterally, respirations unlabored                 Chest Wall:    No tenderness or deformity                          Heart:    Regular rate and rhythm, S1 and S2 normal, no murmur                  Abdomen:     Soft, non-tender, bowel sounds active, no masses, Hopkins catheter in place                 Extremities:   Extremities normal, atraumatic, no cyanosis or edema                        Pulses:   Pulses palpable in all extremities                            Skin:   Skin is warm and dry,  no rashes or palpable lesions                  Neurologic: Grossly nonfocal examination.    Data Review:    I reviewed the patient's new clinical results.      Results from last 7 days   Lab Units 04/21/21  0431 04/20/21  0535 04/19/21  0654 04/18/21  0732 04/17/21  0722 04/16/21  0511 04/15/21  0859   SODIUM mmol/L 136 135* 136 136 136 138 140   POTASSIUM mmol/L 3.9 3.4* 3.5 3.5 3.9 3.4* 3.7   CHLORIDE mmol/L 94* 90* 89* 89* 89* 93* 96*   CO2 mmol/L 32.2* 36.4* 37.9* 38.0* 37.5* 36.2* 32.9*   BUN mg/dL 42* 44* 39* 36* 32* 33* 31*   CREATININE mg/dL 1.56* 1.64* 1.58* 1.32* 1.44* 1.27 1.43*   CALCIUM mg/dL 8.9 9.2 9.0 8.8 9.4 9.1 8.4*   GLUCOSE mg/dL 207* 250* 231* 338* 257* 207* 257*     Microbiology Results (last 10 days)     Procedure Component Value - Date/Time    COVID PRE-OP / PRE-PROCEDURE SCREENING ORDER (NO ISOLATION) - Swab, Nasopharynx [[id number]]  (Normal) Collected: 04/16/21 2030    Lab Status: Final result Specimen: Swab from Nasopharynx Updated: 04/16/21 2122    Narrative:      The following orders were created for panel order COVID PRE-OP / PRE-PROCEDURE SCREENING ORDER (NO ISOLATION) - Swab, Nasopharynx.  Procedure                                Abnormality         Status                     ---------                               -----------         ------                     COVID-19,[facility]-HOUSE...[[id number]]  Normal              Final result                 Please view results for these tests on the individual orders.    COVID-19,[facility]HOU[facility] CEPHEID, NP SWAB IN TRANSPORT MEDIA 8-12 HR TAT - Swab, Nasopharynx [[id number]]  (Normal) Collected: 04/16/21 2030    Lab Status: Final result Specimen: Swab from Nasopharynx Updated: 04/16/21 2122     COVID19 Not Detected    Narrative:      Fact sheet for providers: https://www.fda.gov/media/346636/download     Fact sheet for patients: https://www.fda.gov/media/016283/download    Urine Culture - Urine, Urine, Catheter [[id number]]  (Abnormal)  (Susceptibility) Collected: 04/14/21 0610    Lab Status: Final result Specimen: Urine, Catheter Updated: 04/16/21 0936     Urine Culture >100,000 CFU/mL Klebsiella pneumoniae ESBL     Comment: Consider infectious disease consult.  Susceptibility results may not correlate to clinical outcomes.       Susceptibility      Klebsiella pneumoniae ESBL      KAT      Amikacin Susceptible      Ertapenem Susceptible      Gentamicin Resistant      Levofloxacin Resistant      Meropenem Susceptible      Nitrofurantoin Resistant      Piperacillin + Tazobactam Resistant      Tetracycline Resistant      Tobramycin Intermediate      Trimethoprim + Sulfamethoxazole Resistant               Linear View                           Assessment:    Type 2 diabetes mellitus with hyperglycemia, with long-term current use of insulin (CMS/[facility])    CAD (coronary artery disease)    Acute on chronic diastolic CHF (congestive heart failure) (CMS/[facility])    CSA (central sleep apnea)    HTN (hypertension)    Urinary retention    Acute on chronic renal failure (CMS/[facility])    Acute on chronic diastolic (congestive) heart failure (CMS/[facility])    Bacteriuria    Rectal  pain  Recommendations/discussion:   · Continue to observe off of antibiotic therapy.  · Watch closely for evidence of local or systemic infection that could be attributed to urinary tract infection such as altered mental status fever local discomfort and pain and intolerance to Hopkins catheter.    Tino Nagel MD  4/22/2021  06:48 EDT    Much of this encounter note is an electronic transcription/translation of spoken language to printed text. The electronic translation of spoken language may permit erroneous, or at times, nonsensical words or phrases to be inadvertently transcribed; Although I have reviewed the note for such errors, some may still exist

## 2021-04-22 NOTE — PROGRESS NOTES
Continued Stay Note  [facility]     Patient Name: Dilip Verma  MRN: [id number]  Today's Date: 4/22/2021    Admit Date: 4/12/2021    Discharge Plan     Row Name 04/22/21 1723       Plan    Plan  [facility] pending [facility] pre-cert    Plan Comments  Spoke with [facility] pre-cert pending. jelani grant/ccp        Discharge Codes    No documentation.       Expected Discharge Date and Time     Expected Discharge Date Expected Discharge Time    Apr 21, 2021             Abi Bonds RN

## 2021-04-22 NOTE — PROGRESS NOTES
"Adult Nutrition  Assessment/PES    Patient Name:  Dilip Verma  YOB: 1950  MRN: [id number]  Admit Date:  4/12/2021    Assessment Date:  4/22/2021    Comments:  Nutrition screen completed for LOS. Pt on Regular Consistent Carb, Low Sodium LINO diet with adequate po intake. BMI 27.14kg/m2. Skin intact.     Reason for Assessment     Row Name 04/22/21 0812          Reason for Assessment    Reason For Assessment  -- LOS     Diagnosis  -- DM, CAD, CHF, CRF         Nutrition/Diet History     Row Name 04/22/21 0812          Nutrition/Diet History    Typical Food/Fluid Intake  po 75%         Anthropometrics     Row Name 04/22/21 0813 04/22/21 0605       Anthropometrics    Height  175.3 cm (69.02\")  --    Weight  83.2 kg (183 lb 7.9 oz) not weighed by RD  83.2 kg (183 lb 7.9 oz)       Ideal Body Weight (IBW)    Ideal Body Weight (IBW) (kg)  73.73  --    % Ideal Body Weight  112.89  --       Body Mass Index (BMI)    BMI (kg/m2)  27.14  --    BMI Assessment  BMI 25-29.9: overweight  --        Labs/Tests/Procedures/Meds     Row Name 04/22/21 0813          Labs/Procedures/Meds    Lab Results Reviewed  reviewed     Lab Results Comments  glu, bun, cr        Diagnostic Tests/Procedures    Diagnostic Test/Procedure Reviewed  reviewed        Medications    Pertinent Medications Reviewed  reviewed         Physical Findings     Row Name 04/22/21 0814          Physical Findings    Skin  -- halley 19         Estimated/Assessed Needs     Row Name 04/22/21 0813          Calculation Measurements    Height  175.3 cm (69.02\")         Nutrition Prescription Ordered     Row Name 04/22/21 0814          Nutrition Prescription PO    Current PO Diet  Regular     Common Modifiers  Consistent Carbohydrate;Low Sodium     Low Sodium Details  No added salt                 Problem/Interventions:  Problem 1     Row Name 04/22/21 0814          Nutrition Diagnoses Problem 1    Problem 1  Nutrition Appropriate for Condition at this Time     " Etiology (related to)  Medical Diagnosis               Intervention Goal     Row Name 04/22/21 0815          Intervention Goal    General  Maintain nutrition     PO  PO intake (%)     PO Intake %  75 %     Weight  Appropriate weight loss         Nutrition Intervention     Row Name 04/22/21 0815          Nutrition Intervention    RD/Tech Action  Follow Tx progress;Care plan reviewd           Education/Evaluation     Row Name 04/22/21 0815          Education    Education  Will Instruct as appropriate        Monitor/Evaluation    Monitor  Per protocol           Electronically signed by:  Claudia Rogel RD  04/22/21 08:17 EDT

## 2021-04-22 NOTE — PLAN OF CARE
"Goal Outcome Evaluation:  Plan of Care Reviewed With: patient  Progress: improving  Outcome Summary: Pt improving w/ amb of 20' x 2 req CGA/min A and use of rwx. Pt unsteady and amb'ing to L of rwx w/ vc to remain w/in rwx. Pt limited in distance by fatigue and req 4 min rest after each trial. Pt talkative and req freq redirect to task and was returned to bed req more assist - min A x 2 -  2/2 not being able to redirect to s<s. Pt refused TE stating, \"1 thing at a time\" after amb. Pt's wife states pt is to dc to SNF later today.    Patient was intermittently wearing a face mask during this therapy encounter. Therapist used appropriate personal protective equipment including eye protection, mask, and gloves.  Mask used was standard procedure mask. Appropriate PPE was worn during the entire therapy session. Hand hygiene was completed before and after therapy session. Patient is not in enhanced droplet precautions.  PTA student: Marisol Bhakta.  "

## 2021-04-22 NOTE — PROGRESS NOTES
Name: Dilip Verma ADMIT: 2021   : 1950  PCP: Dakota Avila MD    MRN: [id number] LOS: 8 days   AGE/SEX: 71 y.o. male  ROOM: [facility]     Subjective   Subjective    Appetite is fair. BG trends reviewed. No new medical concerns    Review of Systems   Constitutional: Negative for fever.   HENT: Negative for congestion.    Respiratory: Negative for shortness of breath.    Cardiovascular: Negative for chest pain.   Gastrointestinal: Negative for nausea.   Genitourinary: Negative for dysuria.   Musculoskeletal: Negative for arthralgias.   Skin: Negative for rash.   Neurological: Negative for headaches.   Psychiatric/Behavioral: Positive for sleep disturbance.        Objective   Objective   Vital Signs  Temp:  [97.4 °F (36.3 °C)-98 °F (36.7 °C)] 97.4 °F (36.3 °C)  Heart Rate:  [62] 62  Resp:  [18] 18  BP: (113-138)/(65-68) 138/68  SpO2:  [94 %] 94 %  on  Flow (L/min):  [2] 2;   Device (Oxygen Therapy): nasal cannula  Body mass index is 27.09 kg/m².  Physical Exam  Vitals and nursing note reviewed.   Constitutional:       General: He is not in acute distress.     Appearance: He is ill-appearing.      Comments: Chronically ill appearing   HENT:      Head: Normocephalic.      Mouth/Throat:      Mouth: Mucous membranes are moist.   Eyes:      Conjunctiva/sclera: Conjunctivae normal.   Cardiovascular:      Rate and Rhythm: Normal rate and regular rhythm.   Pulmonary:      Effort: Pulmonary effort is normal. No respiratory distress.      Breath sounds: Examination of the right-lower field reveals decreased breath sounds. Examination of the left-lower field reveals decreased breath sounds. Decreased breath sounds present.   Abdominal:      General: Bowel sounds are normal.      Palpations: Abdomen is soft.   Musculoskeletal:      Cervical back: Neck supple.      Right lower leg: Edema present.      Left lower leg: Edema present.   Skin:     General: Skin is warm and dry.   Neurological:      Mental Status: He  is alert and oriented to person, place, and time.   Psychiatric:         Mood and Affect: Mood normal.         Behavior: Behavior normal.         Results Review     I reviewed the patient's new clinical results.      Results from last 7 days   Lab Units 04/22/21  0644 04/21/21  0431 04/20/21  0535 04/19/21  0654   SODIUM mmol/L 137 136 135* 136   POTASSIUM mmol/L 4.1 3.9 3.4* 3.5   CHLORIDE mmol/L 96* 94* 90* 89*   CO2 mmol/L 28.8 32.2* 36.4* 37.9*   BUN mg/dL 42* 42* 44* 39*   CREATININE mg/dL 1.20 1.56* 1.64* 1.58*   GLUCOSE mg/dL 215* 207* 250* 231*   Estimated Creatinine Clearance: 65.5 mL/min (by C-G formula based on SCr of 1.2 mg/dL).    Results from last 7 days   Lab Units 04/22/21  0644 04/21/21 0431 04/20/21  0535 04/19/21  0654 04/16/21  0511   CALCIUM mg/dL 9.1 8.9 9.2 9.0 9.1   MAGNESIUM mg/dL  --   --   --   --  1.9       COVID19   Date Value Ref Range Status   04/16/2021 Not Detected Not Detected - Ref. Range Final   12/16/2020 Not Detected Not Detected - Ref. Range Final     Glucose   Date/Time Value Ref Range Status   04/22/2021 1042 260 (H) 70 - 130 mg/dL Final   04/22/2021 0540 224 (H) 70 - 130 mg/dL Final   04/21/2021 2122 141 (H) 70 - 130 mg/dL Final   04/21/2021 1633 174 (H) 70 - 130 mg/dL Final   04/21/2021 1119 335 (H) 70 - 130 mg/dL Final   04/21/2021 0544 197 (H) 70 - 130 mg/dL Final   04/20/2021 2158 205 (H) 70 - 130 mg/dL Final       XR Foot 3+ View Left, XR Ankle 3+ View Left  Narrative: LEFT ANKLE, LEFT FOOT     HISTORY: Left ankle and foot pain since December 2020, patient fell.     COMPARISON: None.     FINDINGS:  LEFT ANKLE: 3 VIEWS: There is generalized soft tissue swelling about the  distal calf, ankle, hindfoot. Microvascular calcifications are present.  There is a small degenerative plantar calcaneal spur. There is a chronic  appearing avulsion fracture at the dorsal talar head with avulsion  fragment measuring approximately 11 mm AP by 3 mm in height.     LEFT FOOT: 3 VIEWS:  There is generalized soft tissue swelling about the  left foot. Midfoot alignment appears within normal limits. There is mild  hallux posterior deformity. Bipartite medial hallux sesamoid is present.  There is no evidence for fracture or acute abnormality.     Impression: 1. Generalized soft tissue swelling about the left distal calf, ankle,  foot.  2. Chronic appearing avulsion fracture of the dorsal talar head.  3. No evidence for acute fracture or acute osseous abnormality   4. Microvascular calcifications.     This report was finalized on 4/16/2021 2:07 PM by Dr. Zechariah Falcon M.D.     XR Abdomen KUB  Narrative: KUB     HISTORY: Left ankle and foot pain. Lower abdominal pain.     COMPARISON: None.     FINDINGS: There is mild to moderate stool throughout the colon. There  are no air-filled dilated bowel loops to suggest obstruction. There is  mild gaseous distention of the stomach. There is a catheter extending to  the central pelvis. Surgical clips are present in the upper abdomen.  There has been gamma nail and cerclage wire fixation of a left proximal  femoral fracture. Fracture extends through the intertrochanteric region  into the lateral subtrochanteric region with varus angulation. There is  no clear bony union.     Impression: 1. Mild-to-moderate stool throughout the colon. Nonobstructive bowel gas  pattern. Mild gaseous distention of stomach.  2. Previous gamma nail and cerclage wire fixation of the left proximal  femoral fracture that does not appear united and exhibits varus  angulation.     This report was finalized on 4/16/2021 1:44 PM by Dr. Zechariah Falcon M.D.       Scheduled Medications  aspirin, 81 mg, Oral, Daily  bisacodyl, 10 mg, Rectal, Daily  bumetanide, 2 mg, Oral, BID  carvedilol, 12.5 mg, Oral, BID With Meals  enoxaparin, 40 mg, Subcutaneous, Q24H  insulin glargine, 28 Units, Subcutaneous, QAM  insulin lispro, 0-14 Units, Subcutaneous, TID AC  insulin lispro, 6 Units, Subcutaneous,  TID With Meals  polyethylene glycol, 17 g, Oral, Daily  sodium chloride, 10 mL, Intravenous, Q12H  spironolactone, 25 mg, Oral, Daily  tamsulosin, 0.4 mg, Oral, BID    Infusions   Diet  Diet Regular; Consistent Carbohydrate, Low Sodium; No Added Salt       Assessment/Plan     Active Hospital Problems    Diagnosis  POA   • Bacteriuria [R82.71]  Unknown   • Rectal pain [K62.89]  Unknown   • Acute on chronic diastolic (congestive) heart failure (CMS/[facility]) [I50.33]  Yes   • Acute on chronic renal failure (CMS/[facility]) [N17.9, N18.9]  Unknown   • Urinary retention [R33.9]  Yes   • HTN (hypertension) [I10]  Yes   • CSA (central sleep apnea) [G47.31]  Yes   • Acute on chronic diastolic CHF (congestive heart failure) (CMS/[facility]) [I50.33]  Unknown   • CAD (coronary artery disease) [I25.10]  Yes   • Type 2 diabetes mellitus with hyperglycemia, with long-term current use of insulin (CMS/[facility]) [E11.65, Z79.4]  Not Applicable      Resolved Hospital Problems   No resolved problems to display.       Type 2 diabetes mellitus with hyperglycemia, with long-term current use of insulin     -Continue current regimen of Lantus, correctional scale insulin; patient and wife have been refusing meal-time insulin.   -Follow up with endocrinologist for continued outpatient management of DM. A1C 7.6%    D/W patient and wife      LEIDY Escalante  [facility]  04/22/21  13:11 EDT

## 2021-04-23 VITALS
DIASTOLIC BLOOD PRESSURE: 60 MMHG | HEIGHT: 69 IN | WEIGHT: 184 LBS | TEMPERATURE: 97.6 F | OXYGEN SATURATION: 95 % | RESPIRATION RATE: 18 BRPM | SYSTOLIC BLOOD PRESSURE: 101 MMHG | HEART RATE: 60 BPM | BODY MASS INDEX: 27.25 KG/M2

## 2021-04-23 LAB
ANION GAP SERPL CALCULATED.3IONS-SCNC: 10.1 MMOL/L (ref 5–15)
BUN SERPL-MCNC: 42 MG/DL (ref 8–23)
BUN/CREAT SERPL: 28.6 (ref 7–25)
CALCIUM SPEC-SCNC: 9.6 MG/DL (ref 8.6–10.5)
CHLORIDE SERPL-SCNC: 93 MMOL/L (ref 98–107)
CO2 SERPL-SCNC: 34.9 MMOL/L (ref 22–29)
CREAT SERPL-MCNC: 1.47 MG/DL (ref 0.76–1.27)
GFR SERPL CREATININE-BSD FRML MDRD: 47 ML/MIN/1.73
GLUCOSE BLDC GLUCOMTR-MCNC: 163 MG/DL (ref 70–130)
GLUCOSE BLDC GLUCOMTR-MCNC: 243 MG/DL (ref 70–130)
GLUCOSE BLDC GLUCOMTR-MCNC: 311 MG/DL (ref 70–130)
GLUCOSE SERPL-MCNC: 237 MG/DL (ref 65–99)
PLATELET # BLD AUTO: 180 10*3/MM3 (ref 140–450)
POTASSIUM SERPL-SCNC: 4 MMOL/L (ref 3.5–5.2)
SODIUM SERPL-SCNC: 138 MMOL/L (ref 136–145)

## 2021-04-23 PROCEDURE — 80048 BASIC METABOLIC PNL TOTAL CA: CPT | Performed by: INTERNAL MEDICINE

## 2021-04-23 PROCEDURE — 63710000001 INSULIN GLARGINE PER 5 UNITS: Performed by: INTERNAL MEDICINE

## 2021-04-23 PROCEDURE — 82962 GLUCOSE BLOOD TEST: CPT

## 2021-04-23 PROCEDURE — 99239 HOSP IP/OBS DSCHRG MGMT >30: CPT | Performed by: NURSE PRACTITIONER

## 2021-04-23 PROCEDURE — 25010000002 ENOXAPARIN PER 10 MG: Performed by: INTERNAL MEDICINE

## 2021-04-23 PROCEDURE — 97116 GAIT TRAINING THERAPY: CPT

## 2021-04-23 PROCEDURE — 63710000001 INSULIN LISPRO (HUMAN) PER 5 UNITS: Performed by: INTERNAL MEDICINE

## 2021-04-23 PROCEDURE — 97535 SELF CARE MNGMENT TRAINING: CPT

## 2021-04-23 PROCEDURE — 85049 AUTOMATED PLATELET COUNT: CPT | Performed by: INTERNAL MEDICINE

## 2021-04-23 RX ORDER — SPIRONOLACTONE 25 MG/1
25 TABLET ORAL DAILY
Start: 2021-04-24 | End: 2022-09-19 | Stop reason: ALTCHOICE

## 2021-04-23 RX ORDER — QUETIAPINE FUMARATE 25 MG/1
25 TABLET, FILM COATED ORAL NIGHTLY
Start: 2021-04-23 | End: 2021-06-16 | Stop reason: SINTOL

## 2021-04-23 RX ORDER — QUETIAPINE FUMARATE 25 MG/1
25 TABLET, FILM COATED ORAL NIGHTLY
Status: DISCONTINUED | OUTPATIENT
Start: 2021-04-23 | End: 2021-04-23 | Stop reason: HOSPADM

## 2021-04-23 RX ORDER — BUMETANIDE 1 MG/1
1 TABLET ORAL 2 TIMES DAILY
Start: 2021-04-23 | End: 2022-09-19

## 2021-04-23 RX ORDER — BUMETANIDE 1 MG/1
1 TABLET ORAL
Status: DISCONTINUED | OUTPATIENT
Start: 2021-04-23 | End: 2021-04-23 | Stop reason: HOSPADM

## 2021-04-23 RX ORDER — AMOXICILLIN 250 MG
2 CAPSULE ORAL 2 TIMES DAILY PRN
Qty: 56 TABLET | Refills: 0 | Status: SHIPPED | OUTPATIENT
Start: 2021-04-23 | End: 2022-09-19 | Stop reason: ALTCHOICE

## 2021-04-23 RX ADMIN — ACETAMINOPHEN 650 MG: 325 TABLET, FILM COATED ORAL at 00:45

## 2021-04-23 RX ADMIN — INSULIN GLARGINE 28 UNITS: 100 INJECTION, SOLUTION SUBCUTANEOUS at 06:49

## 2021-04-23 RX ADMIN — ASPIRIN 81 MG: 81 TABLET, COATED ORAL at 08:56

## 2021-04-23 RX ADMIN — CARVEDILOL 12.5 MG: 12.5 TABLET, FILM COATED ORAL at 08:56

## 2021-04-23 RX ADMIN — ACETAMINOPHEN 650 MG: 325 TABLET, FILM COATED ORAL at 05:28

## 2021-04-23 RX ADMIN — INSULIN LISPRO 3 UNITS: 100 INJECTION, SOLUTION INTRAVENOUS; SUBCUTANEOUS at 16:40

## 2021-04-23 RX ADMIN — BUMETANIDE 1 MG: 1 TABLET ORAL at 16:41

## 2021-04-23 RX ADMIN — SPIRONOLACTONE 25 MG: 25 TABLET ORAL at 08:56

## 2021-04-23 RX ADMIN — TAMSULOSIN HYDROCHLORIDE 0.4 MG: 0.4 CAPSULE ORAL at 08:56

## 2021-04-23 RX ADMIN — CARVEDILOL 12.5 MG: 12.5 TABLET, FILM COATED ORAL at 16:41

## 2021-04-23 RX ADMIN — INSULIN LISPRO 5 UNITS: 100 INJECTION, SOLUTION INTRAVENOUS; SUBCUTANEOUS at 12:16

## 2021-04-23 RX ADMIN — INSULIN LISPRO 6 UNITS: 100 INJECTION, SOLUTION INTRAVENOUS; SUBCUTANEOUS at 12:19

## 2021-04-23 RX ADMIN — SODIUM CHLORIDE, PRESERVATIVE FREE 10 ML: 5 INJECTION INTRAVENOUS at 08:56

## 2021-04-23 RX ADMIN — BISACODYL 10 MG: 10 SUPPOSITORY RECTAL at 09:01

## 2021-04-23 RX ADMIN — ENOXAPARIN SODIUM 40 MG: 40 INJECTION SUBCUTANEOUS at 15:12

## 2021-04-23 RX ADMIN — INSULIN LISPRO 10 UNITS: 100 INJECTION, SOLUTION INTRAVENOUS; SUBCUTANEOUS at 06:49

## 2021-04-23 RX ADMIN — BUMETANIDE 2 MG: 2 TABLET ORAL at 08:56

## 2021-04-23 RX ADMIN — ACETAMINOPHEN 650 MG: 325 TABLET, FILM COATED ORAL at 12:19

## 2021-04-23 RX ADMIN — POLYETHYLENE GLYCOL 3350 17 G: 17 POWDER, FOR SOLUTION ORAL at 08:55

## 2021-04-23 NOTE — DISCHARGE SUMMARY
Hospital Discharge    Patient Name: Dilip Verma  Age/Sex: 71 y.o. male  : 1950  MRN: [id number]    Encounter Provider: LEIDY Benoit  Referring Provider: Amador Reyes Jr., MD  Place of Service: [facility] CARDIOLOGY  Patient Care Team:  Dakota Avila MD as PCP - General (Family Medicine)  Morris Cai MD as Consulting Physician (Sleep Medicine)  Michaela Hylton MD as Consulting Physician (Cardiology)  Hardy Banerjee MD as Consulting Physician (Ophthalmology)  Chula Christianson MD as Consulting Physician (Ophthalmology)  Jason Sherman MD (Endocrinology)  Perla Mayen MD as Consulting Physician (Nephrology)  Federico Hebert MD as Consulting Physician (Pulmonary Disease)  Johan Dillard, LUC as Ambulatory  ([facility])  Niraj Ravi MD as Consulting Physician (Orthopedic Surgery)  Papa Velasco MD as Consulting Physician (Urology)         Date of Discharge:  2021   Date of Admit: 2021    Discharge Condition: Stable  Discharge Diagnosis:    Type 2 diabetes mellitus with hyperglycemia, with long-term current use of insulin (CMS/[facility])    CAD (coronary artery disease)    Acute on chronic diastolic CHF (congestive heart failure) (CMS/HCC)    CSA (central sleep apnea)    HTN (hypertension)    Urinary retention    Acute on chronic renal failure (CMS/HCC)    Acute on chronic diastolic (congestive) heart failure (CMS/HCC)    Bacteriuria    Rectal pain  indwelling light present on arrival    Hospital Course:   Dilip Verma is a 71 y.o. male who follows with Dr. Reyes for coronary artery disease and diastolic congestive heart failure. He also has a history of insulin dependent diabetes mellitus type II and severe hypertension that has previously been difficult to control. He was seen in the office on 21 by LEIDY Mejía and was found to be in decompensated heart failure with 50 lb weight  gain. Admission to the hospital was recommended however there were no beds. He did not want to go to the ER. He went home and was directly admitted from home on 4/12. He was diuresed with IV bumetanide and was followed by nephrology. He had an abnormal urinalysis with indwelling light catheter that was present on admission. Urology was consulted to see while he was here. He was treated with abx. Light catheter remains and he is stable. Culture grew ESBL Klebsiella likely colonization and ID did not recommend further antibiotics.  He has a subacute orthopedic fracture that was being treated as an outpatient. There was question as to whether he could bear weight or not. Ortho saw and cleared him for WBAT. He had rectal pain. General surgery saw. Rectal exam was benign. He continued to diurese well and lost over 50 lbs. Patient has a long standing history of anxiety/PTSD/insomnia. He was followed by access center. This is a major problem for him and his wife however it is not an acute issue as it has been going on for years. Dr. Ballesteros did see on date of discharge and recommended low dose seroquel to help with insomnia. This medication is to be started tonight and will be a new med for him. Further titration can be considered as outpatient. We were very hesitant to utilize any sedating medications due to his risk of respiratory depression. He also does not tolerate much of his bipap at night. He has reached max benefit from inpatient hospitalization. He is stable on oral diuretics. He has maintained his weight of 183-184lbs on current regimen. His blood pressure has been well controlled. He will be discharged to [facility]. He will need nephrology follow up in 2-3 weeks. He will need to see Dr. Reyes in one month.      Objective:  Temp:  [96.5 °F (35.8 °C)-97.6 °F (36.4 °C)] 97.6 °F (36.4 °C)  Heart Rate:  [60] 60  Resp:  [18] 18  BP: (101-158)/(45-84) 101/60    Intake/Output Summary (Last 24 hours) at  4/23/2021 1459  Last data filed at 4/23/2021 1335  Gross per 24 hour   Intake 960 ml   Output 3350 ml   Net -2390 ml     Body mass index is 27.16 kg/m².      04/22/21  0813 04/23/21  0900 04/23/21  1300   Weight: 83.2 kg (183 lb 7.9 oz) (not weighed by RD) 86.3 kg (190 lb 4.1 oz) 83.5 kg (184 lb)     Weight change: 0 kg (0 lb)    Physical Exam:  Constitutional: He is oriented to person, place, and time. He appears well-developed. He does not appear ill.   HENT:   Head: Normocephalic and atraumatic. Head is without contusion.   Right Ear: Hearing normal. No drainage.   Left Ear: Hearing normal. No drainage.   Nose: No nasal deformity. No epistaxis.   Eyes: Lids are normal. Right eye exhibits no exudate. Left eye exhibits no exudate.  Neck: No JVD present.   Cardiovascular: Normal rate, regular rhythm and 1/6 murmur.    Pulses:       Posterior tibial pulses are 2+ on the right side, and 2+ on the left side.   Pulmonary/Chest: Effort normal and breath sounds normal.   Abdominal: Soft. Normal appearance and bowel sounds are normal. There is no tenderness.   Musculoskeletal: Normal range of motion.        Right shoulder: He exhibits no deformity.        Left shoulder: He exhibits no deformity.   Neurological: He is alert and oriented to person, place, and time. He has normal strength.   Skin: Skin is warm, dry and intact. No rash noted.   Psychiatric: He has a normal mood and affect. His behavior is normal. Thought content normal.   Vitals reviewed      Procedures Performed         Consults:  Consults     Date and Time Order Name Status Description    4/22/2021  2:53 PM Inpatient Psychiatrist Consult Completed     4/16/2021 12:19 PM Inpatient Orthopedic Surgery Consult Completed     4/16/2021 12:16 PM Inpatient Infectious Diseases Consult Completed     4/16/2021 11:28 AM Inpatient General Surgery Consult Completed     4/13/2021  1:37 PM Inpatient Urology Consult Completed     4/12/2021  2:45 PM Inpatient Nephrology  Consult Completed     4/12/2021  2:32 PM Inpatient Internal Medicine Consult Completed     4/5/2021  5:09 PM Urology (on-call MD unless specified) Completed           Pertinent Test Results:  Results from last 7 days   Lab Units 04/23/21  1027 04/22/21  0644 04/21/21  0431 04/20/21  0535 04/19/21  0654 04/18/21  0732 04/17/21  0722   SODIUM mmol/L 138 137 136 135* 136 136 136   POTASSIUM mmol/L 4.0 4.1 3.9 3.4* 3.5 3.5 3.9   CHLORIDE mmol/L 93* 96* 94* 90* 89* 89* 89*   CO2 mmol/L 34.9* 28.8 32.2* 36.4* 37.9* 38.0* 37.5*   BUN mg/dL 42* 42* 42* 44* 39* 36* 32*   CREATININE mg/dL 1.47* 1.20 1.56* 1.64* 1.58* 1.32* 1.44*   GLUCOSE mg/dL 237* 215* 207* 250* 231* 338* 257*   CALCIUM mg/dL 9.6 9.1 8.9 9.2 9.0 8.8 9.4         Results from last 7 days   Lab Units 04/23/21  0606   PLATELETS 10*3/mm3 180                   Invalid input(s): LDLCALC            Discharge Medications     Discharge Medications      New Medications      Instructions Start Date   bumetanide 1 MG tablet  Commonly known as: BUMEX   1 mg, Oral, 2 Times Daily      QUEtiapine 25 MG tablet  Commonly known as: SEROquel   25 mg, Oral, Nightly      spironolactone 25 MG tablet  Commonly known as: ALDACTONE   25 mg, Oral, Daily   Start Date: April 24, 2021        Changes to Medications      Instructions Start Date   sennosides-docusate 8.6-50 MG per tablet  Commonly known as: PERICOLACE  What changed:   · when to take this  · reasons to take this   2 tablets, Oral, 2 Times Daily PRN      tamsulosin 0.4 MG capsule 24 hr capsule  Commonly known as: FLOMAX  What changed:   · how much to take  · when to take this   TAKE 2 CAPSULES BY MOUTH AT BEDTIME          Continue These Medications      Instructions Start Date   AFRIN NASAL SPRAY NA   Nasal      aspirin 81 MG EC tablet   81 mg, Oral, Daily      carvedilol 12.5 MG tablet  Commonly known as: COREG   Take one tablet twice daily with meals      Insulin Glargine (2 Unit Dial) 300 UNIT/ML solution pen-injector  injection  Commonly known as: TOUJEO   28 Units, Subcutaneous, Daily      insulin lispro 100 UNIT/ML injection  Commonly known as: humaLOG   0-14 Units, Subcutaneous, 3 Times Daily Before Meals      melatonin 3 MG tablet   9 mg, Oral, Nightly      vitamin D3 125 MCG (5000 UT) capsule capsule   5,000 Units, Oral, Daily         Stop These Medications    furosemide 40 MG tablet  Commonly known as: LASIX     hydrALAZINE 25 MG tablet  Commonly known as: APRESOLINE            Discharge Diet:    Dietary Orders (From admission, onward)     Start     Ordered    04/12/21 1432  Diet Regular; Consistent Carbohydrate, Low Sodium; No Added Salt  Diet Effective Now     Question Answer Comment   Diet Texture / Consistency Regular    Common Modifiers Consistent Carbohydrate    Common Modifiers Low Sodium    Low Sodium Restriction: No Added Salt        04/12/21 1432                Activity at Discharge:    Activity Instructions     Gradually Increase Activity Until at Pre-Hospitalization Level             Discharge disposition: SNF     Discharge Instructions and Follow ups:  Future Appointments   Date Time Provider Department Center   5/17/2021 10:30 AM [facility] 3  [facility]   7/1/2021 11:30 AM Amador Reyes Jr., MD [facility] None      Contact information for follow-up providers     Dakota Avila MD .    Specialty: Family Medicine  Contact information:  [address] [address]  [phone]                   Contact information for after-discharge care     Destination     [facility] .    Service: Skilled Nursing  Contact information:  [facility] [address] [phone]                 Home Medical Care     [facility] .    Service: Home Health Services  Contact information:  [address] [place] [address] [phone]                             Test Results Pending at Discharge: none     Marian Boateng,  APRN  04/23/21  14:59 EDT    Time: Discharge 45 min

## 2021-04-23 NOTE — PROGRESS NOTES
Continued Stay Note  [facility]     Patient Name: Dilip Verma  MRN: [id number]  Today's Date: 4/23/2021    Admit Date: 4/12/2021    Discharge Plan     Row Name 04/23/21 1335       Plan    Plan  [facility] AT 5PM TODAY    Patient/Family in Agreement with Plan  yes    Plan Comments  EMS setup for 5pm today. Spoke with Marian/Cardiology APRN. Updated [facility].IMM Carlos. JO ANN CALLE/CCP        Discharge Codes    No documentation.       Expected Discharge Date and Time     Expected Discharge Date Expected Discharge Time    Apr 21, 2021             Abi Bonds, LUC

## 2021-04-23 NOTE — PLAN OF CARE
Goal Outcome Evaluation:  Plan of Care Reviewed With: (P) patient, spouse  Progress: (P) improving  Outcome Summary: (P) Pt amb inc to 38' w/ a seated rest break and then 38' back to bed during PT w/ Min/mod A x1 w/ Rolator. Pt unsteady at times w/ 1 LOB and Min A to correct. Pt req encouragement and v/tc for BLE and AD positioning. PTA pushing chair and O2 behind pt while amb.  Pt will benefit from PT as amirah.    Patient was wearing a face mask during this therapy encounter. Therapist used appropriate personal protective equipment including eye protection, mask, and gloves.  Mask used was standard procedure mask. Appropriate PPE was worn during the entire therapy session. Hand hygiene was completed before and after therapy session. Patient is not in enhanced droplet precautions.

## 2021-04-23 NOTE — PROGRESS NOTES
"Physicians Statement of Medical Necessity for  Ambulance Transportation    GENERAL INFORMATION     Name: Dilip Verma  YOB: 1950  Medicare #: [employer] [id number]  Transport Date: 4/23/2021 (Valid for round trips this date, or for scheduled repetitive trips for 60 days from the date signed below.)  Origin: [facility]  Destination: [facility]  Is the Patient's stay covered under Medicare Part A (PPS/DRG?)Yes  Closest appropriate facility? Yes  If this a hosp-hosp transfer? No  Is this a hospice patient? No    MEDICAL NECESSITY QUESTIONAIRE    Ambulance Transportation is medically necessary only if other means of transportation are contraindicated or would be potentially harmful to the patient.  To meet this requirement, the patient must be either \"bed confined\" or suffer from a condition such that transport by means other than an ambulance is contraindicated by the patient's condition.  The following questions must be answered by the healthcare professional signing below for this form to be valid:     1) Describe the MEDICAL CONDITION (physical and/or mental) of this patient AT THE TIME OF AMBULANCE TRANSPORT that requires the patient to be transported in an ambulance, and why transport by other means is contraindicated by the patient's condition: HEART FAILURE, AMENA on chronic kidney disease, HX of orthopedic fracture    Past Medical History:   Diagnosis Date   • AMENA (acute kidney injury) (CMS/[facility]) 12/3/2020   • Anxiety    • Chronic diastolic (congestive) heart failure (CMS/[facility])    • Chronic kidney disease     stones- had lithotripsy   • CKD (chronic kidney disease) stage 3, GFR 30-59 ml/min (CMS/[facility]) 12/1/2020   • Closed nondisplaced intertrochanteric fracture of left femur (CMS/[facility]) 12/1/2020   • Colon polyp    • Coronary artery disease     CABG 7/2019   • Diabetes mellitus, type II (CMS/[facility])    • Erectile dysfunction    • H/O bone density " "study never   • Hyperlipidemia    • Hypersomnia    • Hypertension    • Kidney stone    • Orthostasis    • Periodic breathing    • Routine eye exam 2015   • Sleep apnea     bipap   • Stroke (CMS/[facility])     \"slight stroke\"      Past Surgical History:   Procedure Laterality Date   • CARDIAC CATHETERIZATION N/A 7/15/2019    Procedure: Coronary angiography;  Surgeon: Carrie Price MD;  Location: [facility] CATH INVASIVE LOCATION;  Service: Cardiovascular   • CARDIAC CATHETERIZATION N/A 7/15/2019    Procedure: Left Heart Cath;  Surgeon: Carrie Price MD;  Location: [facility] CATH INVASIVE LOCATION;  Service: Cardiovascular   • CARDIAC CATHETERIZATION N/A 7/15/2019    Procedure: Left ventriculography;  Surgeon: Carrie Price MD;  Location: [facility] CATH INVASIVE LOCATION;  Service: Cardiovascular   • CARDIAC CATHETERIZATION  7/15/2019    Procedure: Functional Flow [place];  Surgeon: Carrie Price MD;  Location: [facility] CATH INVASIVE LOCATION;  Service: Cardiovascular   • CARDIAC CATHETERIZATION N/A 11/6/2019    Procedure: Right and Left Heart Cath;  Surgeon: Mya Smith MD;  Location: [facility] INVASIVE LOCATION;  Service: Cardiovascular   • CARDIAC CATHETERIZATION N/A 11/6/2019    Procedure: Coronary angiography;  Surgeon: Mya Smith MD;  Location: [facility] CATH INVASIVE LOCATION;  Service: Cardiovascular   • CATARACT EXTRACTION EXTRACAPSULAR W/ INTRAOCULAR LENS IMPLANTATION Bilateral    • COLONOSCOPY  01/2015    dr jimenez   • CORONARY ARTERY BYPASS GRAFT N/A 7/18/2019    Procedure: INTRAOPERATIVE SHOAIB; STERNOTOMY CORONARY ARTERY BYPASS x 3  USING LEFT INTERNAL MAMMARY ARTERY GRAFT UTILIZING ENDOSCOPICALLY HARVESTED RIGHT GREATER SAPHENOUS VEIN AND PRP.;  Surgeon: Bill Devi MD;  Location: [facility] OR;  Service: Cardiothoracic   • DENTAL PROCEDURE      3 surgeries inder implants   • FEMUR IM NAILING/RODDING Left 12/3/2020    Procedure: LEFT HIP INTRAMEDULLARY NAIL;  Surgeon: Niraj Ravi MD;  Location: [facility]" [facility] OR;  Service: Orthopedic Spine;  Laterality: Left;   • HERNIA REPAIR      inguinal bilaterally   • KIDNEY STONE SURGERY      lithotripsy      2) Is this patient \"bed confined\" as defined below?No    To be \"bed confined\" the patient must satisfy all three of the following criteria:  (1) unable to get up from bed without assistance; AND (2) unable to ambulate;  AND (3) unable to sit in a chair or wheelchair.  3) Can this patient safely be transported by car or wheelchair van (I.e., may safely sit during transport, without an attendant or monitoring?)No   4. In addition to completing questions 1-3 above, please check any of the following conditions that apply*:          *Note: supporting documentation for any boxes checked must be maintained in the patient's medical records Non-healed fractures, Requires oxygen - unable to self administer, Unable to tolerate seated position for time needed to transport and Other FALLS RISK, GENERALIZED WEAKNESS, ASSIST X2 TO STAND.       SIGNATURE OF PHYSICIAN OR OTHER AUTHORIZED HEALTHCARE PROFESSIONAL    I certify that the above information is true and correct based on my evaluation of this patient, and represent that the patient requires transport by ambulance and that other forms of transport are contraindicated.  I understand that this information will be used by the Centers for Medicare and Medicaid Services (CMS) to support the determiniation of medical necessity for ambulance services, and I represent that I have personal knowledge of the patient's condition at the time of transport.       If this box is checked, I also certify that the patient is physically or mentally incapable of signing the ambulance service's claim form and that the institution with which I am affiliated has furnished care, services or assistance to the patient.  My signature below is made on behalf of the patient pursuant to 42 .36(b)(4). In accordance with 42 .37, the specific " reason(s) that the patient is physically or mentally incapable of signing the claim for is as follows:     Signature of Physician or Healthcare Professional  Date/Time:   4/23/2021 13:42 EDT      (For Scheduled repetitive transport, this form is not valid for transports performed more than 60 days after this date).                                 Abi Bonds RN                                                                                                             --------------------------------------------------------------------------------------------  Printed Name and Credentials of Physician or Authorized Healthcare Professional     *Form must be signed by patient's attending physician for scheduled, repetitive transports,.  For non-repetitive ambulance transports, if unable to obtain the signature of the attending physician, any of the following may sign (please select below):     Physician  Clinical Nurse Specialist  Registered Nurse     Physician Assistant  Discharge Planner  Licensed Practical Nurse     Nurse Practitioner

## 2021-04-23 NOTE — THERAPY TREATMENT NOTE
"Patient Name: Dilip Verma  : 1950    MRN: [id number]                              Today's Date: 2021       Admit Date: 2021    Visit Dx: No diagnosis found.  Patient Active Problem List   Diagnosis   • Anxiety   • Colon polyp   • Type 2 diabetes mellitus with hyperglycemia, with long-term current use of insulin (CMS/[facility])   • Erectile dysfunction   • Hyperlipidemia   • CAD (coronary artery disease)   • Hx of CABG   • Acute on chronic diastolic CHF (congestive heart failure) (CMS/[facility])   • Hypersomnia due to medical condition   • CSA (central sleep apnea)   • Periodic breathing   • HTN (hypertension)   • History of stroke   • CKD (chronic kidney disease) stage 3, GFR 30-59 ml/min (CMS/[facility])   • Urinary retention   • Acute on chronic renal failure (CMS/[facility])   • Acute on chronic diastolic (congestive) heart failure (CMS/[facility])   • Bacteriuria   • Rectal pain     Past Medical History:   Diagnosis Date   • AMENA (acute kidney injury) (CMS/[facility]) 12/3/2020   • Anxiety    • Chronic diastolic (congestive) heart failure (CMS/[facility])    • Chronic kidney disease     stones- had lithotripsy   • CKD (chronic kidney disease) stage 3, GFR 30-59 ml/min (CMS/[facility]) 2020   • Closed nondisplaced intertrochanteric fracture of left femur (CMS/HCC) 2020   • Colon polyp    • Coronary artery disease     CABG 2019   • Diabetes mellitus, type II (CMS/[facility])    • Erectile dysfunction    • H/O bone density study never   • Hyperlipidemia    • Hypersomnia    • Hypertension    • Kidney stone    • Orthostasis    • Periodic breathing    • Routine eye exam    • Sleep apnea     bipap   • Stroke (CMS/[facility])     \"slight stroke\"     Past Surgical History:   Procedure Laterality Date   • CARDIAC CATHETERIZATION N/A 7/15/2019    Procedure: Coronary angiography;  Surgeon: Carrie Price MD;  Location: [facility] INVASIVE LOCATION;  Service: Cardiovascular   • CARDIAC CATHETERIZATION N/A 7/15/2019    Procedure: Left Heart Cath;  " Surgeon: Carrie Price MD;  Location[facility] CATH [facility]VASIVE LOC[facility];  Service: Cardiovascular   • CARDIAC CATHETERIZATION N/A 7/15/2019    Procedure: Left ventriculography;  Surgeon: Carrie Price MD;  [facility] LOC[facility];  Service: Cardiovascular   • CARDIAC CATHETERIZATION  7/15/2019    Procedure: Functional Flow [place];  Surgeon: Carrie Price MD;  [facility];  Service: Cardiovascular   • CARDIAC CATHETERIZATION N/A 11/6/2019    Procedure: Right and Left Heart Cath;  Surgeon: Mya Smith MD;  [facility];  Service: Cardiovascular   • CARDIAC CATHETERIZATION N/A 11/6/2019    Procedure: Coronary angiography;  Surgeon: Mya Smith MD;  [facility];  Service: Cardiovascular   • CATARACT EXTRACTION EXTRACAPSULAR W/ INTRAOCULAR LENS IMPLANTATION Bilateral    • COLONOSCOPY  01/2015    dr jimenez   • CORONARY ARTERY BYPASS GRAFT N/A 7/18/2019    Procedure: INTRAOPERATIVE SHOAIB; STERNOTOMY CORONARY ARTERY BYPASS x 3  USING LEFT INTERNAL MAMMARY ARTERY GRAFT UTILIZING ENDOSCOPICALLY HARVESTED RIGHT GREATER SAPHENOUS VEIN AND PRP.;  Surgeon: Bill Devi MD;  Location: [facility] OR;  Service: Cardiothoracic   • DENTAL PROCEDURE      3 surgeries inder implants   • FEMUR IM NAILING/RODDING Left 12/3/2020    Procedure: LEFT HIP INTRAMEDULLARY NAIL;  Surgeon: Niraj Ravi MD;  Location: [facility] MAIN OR;  Service: Orthopedic Spine;  Laterality: Left;   • HERNIA REPAIR      inguinal bilaterally   • KIDNEY STONE SURGERY      lithotripsy     General Information     Row Name 04/23/21 125          OT Time and Intention    Document Type  therapy note (daily note)  -RB     Mode of Treatment  individual therapy;occupational therapy  -RB     Row Name 04/23/21 125          General Information    Patient Profile Reviewed  yes  -RB     Row Name 04/23/21 4228          Cognition    Orientation Status (Cognition)   oriented to;person;place  -RB       User Key  (r) = Recorded By, (t) = Taken By, (c) = Cosigned By    Initials Name Provider Type    RB Rhoda Stevens OT Occupational Therapist          Mobility/ADL's     Row Name 04/23/21 1252          Bed Mobility    Bed Mobility  supine-sit  -RB     Supine-Sit [place] (Bed Mobility)  moderate assist (50% patient effort);verbal cues;nonverbal cues (demo/gesture);1 person assist;1 person to manage equipment  -RB     Assistive Device (Bed Mobility)  bed rails;draw sheet;head of bed elevated  -RB     Row Name 04/23/21 1252          Transfers    Transfers  sit-stand transfer;toilet transfer;bed-chair transfer  -RB     Bed-Chair [place] (Transfers)  moderate assist (50% patient effort);maximum assist (25% patient effort);verbal cues;nonverbal cues (demo/gesture);1 person assist;1 person to manage equipment  -RB     Sit-Stand [place] (Transfers)  moderate assist (50% patient effort);verbal cues;nonverbal cues (demo/gesture);1 person assist;1 person to manage equipment  -RB     [place] Level (Toilet Transfer)  maximum assist (25% patient effort);1 person assist;1 person to manage equipment;nonverbal cues (demo/gesture);verbal cues  -RB     Assistive Device (Toilet Transfer)  commode, 3-in-1  -RB     Row Name 04/23/21 1252          Toilet Transfer    Type (Toilet Transfer)  sit-stand;stand-sit  -RB     Row Name 04/23/21 1252          Activities of Daily Living    BADL Assessment/Intervention  toileting  -RB     Row Name 04/23/21 1252          Mobility    Left Lower Extremity (Weight-bearing Status)  weight-bearing as tolerated (WBAT)  -RB     Row Name 04/23/21 1252          Toileting Assessment/Training    [place] Level (Toileting)  toileting skills;dependent (less than 25% patient effort)  -RB     Position (Toileting)  supported sitting;supported standing  -RB     Comment (Toileting)  soiled bed linens, brief, total A for hygiene  -RB       User Key  (r) =  Recorded By, (t) = Taken By, (c) = Cosigned By    Initials Name Provider Type    RB Rhoda Stevens OT Occupational Therapist        Obj/Interventions     Row Name 04/23/21 1258          Balance    Static Sitting Balance  supported;sitting, edge of bed;WFL  -RB     Dynamic Sitting Balance  mild impairment;supported;sitting, edge of bed  -RB     Static Standing Balance  moderate impairment;supported;standing  -RB     Dynamic Standing Balance  severe impairment;standing;supported  -RB     Balance Interventions  occupation based/functional task  -RB     Comment, Balance  Very unsteady today  -RB       User Key  (r) = Recorded By, (t) = Taken By, (c) = Cosigned By    Initials Name Provider Type    RB Rhoda Stevens OT Occupational Therapist        Goals/Plan    No documentation.       Clinical Impression     Row Name 04/23/21 1258          Pain Assessment    Additional Documentation  Pain Scale: FACES Pre/Post-Treatment (Group)  -RB     Row Name 04/23/21 1258          Pain Scale: FACES Pre/Post-Treatment    Pain: FACES Scale, Pretreatment  2-->hurts little bit  -RB     Posttreatment Pain Rating  2-->hurts little bit  -RB     Pain Location - Side  Left  -RB     Pain Location - Orientation  lower  -RB     Pain Location  extremity  -RB     Row Name 04/23/21 1258          Plan of Care Review    Plan of Care Reviewed With  patient  -RB     Progress  no change  -RB     Row Name 04/23/21 1258          Therapy Plan Review/Discharge Plan (OT)    Anticipated Discharge Disposition (OT)  skilled nursing facility  -RB     Row Name 04/23/21 1258          Vital Signs    Pre SpO2 (%)  92  -RB     O2 Delivery Pre Treatment  nasal cannula  -RB     Intra SpO2 (%)  93  -RB     O2 Delivery Intra Treatment  nasal cannula  -RB     Post SpO2 (%)  92  -RB     O2 Delivery Post Treatment  nasal cannula  -RB     Pre Patient Position  Supine  -RB     Intra Patient Position  Standing  -RB     Post Patient Position  Sitting  -RB     Row Name  04/23/21 1258          Positioning and Restraints    Pre-Treatment Position  in bed  -RB     Post Treatment Position  chair  -RB     In Chair  notified nsg;reclined;sitting;call light within reach;encouraged to call for assist;exit alarm on  -RB       User Key  (r) = Recorded By, (t) = Taken By, (c) = Cosigned By    Initials Name Provider Type    Rhoda Garcia OT Occupational Therapist        Outcome Measures    No documentation.       Occupational Therapy Education                 Title: PT OT SLP Therapies (Done)     Topic: Occupational Therapy (Resolved)     Point: ADL training (Resolved)     Description:   Instruct learner(s) on proper safety adaptation and remediation techniques during self care or transfers.   Instruct in proper use of assistive devices.              Learning Progress Summary           Patient Acceptance, E,TB,D, VU,DU,NR by RB at 4/19/2021 1543    Comment: safety awareness, falls risk, d/c planning                   Point: Home exercise program (Resolved)     Description:   Instruct learner(s) on appropriate technique for monitoring, assisting and/or progressing therapeutic exercises/activities.              Learning Progress Summary           Patient Acceptance, E,TB,D, VU,DU,NR by RB at 4/19/2021 1543    Comment: safety awareness, falls risk, d/c planning                   Point: Precautions (Resolved)     Description:   Instruct learner(s) on prescribed precautions during self-care and functional transfers.              Learning Progress Summary           Patient Acceptance, E,TB,D, VU,DU,NR by RB at 4/19/2021 1543    Comment: safety awareness, falls risk, d/c planning                   Point: Body mechanics (Resolved)     Description:   Instruct learner(s) on proper positioning and spine alignment during self-care, functional mobility activities and/or exercises.              Learning Progress Summary           Patient Acceptance, E,TB,D, VU,DU,NR by RB at 4/19/2021 1543     Comment: safety awareness, falls risk, d/c planning                               User Key     Initials Effective Dates Name Provider Type Discipline     04/02/20 -  Rhoda Stevens OT Occupational Therapist OT              OT Recommendation and Plan  Planned Therapy Interventions (OT): activity tolerance training, adaptive equipment training, BADL retraining, cognitive/visual perception retraining, functional balance retraining, neuromuscular control/coordination retraining, occupation/activity based interventions, patient/caregiver education/training, ROM/therapeutic exercise, strengthening exercise, transfer/mobility retraining  Therapy Frequency (OT): 5 times/wk  Plan of Care Review  Plan of Care Reviewed With: patient  Progress: no change     Time Calculation:   Time Calculation- OT     Row Name 04/23/21 1252 04/23/21 0946          Time Calculation- OT    OT Start Time  1100  -RB  --     OT Stop Time  1127  -RB  --     OT Time Calculation (min)  27 min  -RB  --     OT - Next Appointment  04/26/21  -RB  --        Timed Charges    65475 - Gait Training Minutes   --  23  (Pended)   -RW     69680 - OT Self Care/Mgmt Minutes  27  -RB  --        Total Minutes    Timed Charges Total Minutes  27  -RB  23  (Pended)   -RW      Total Minutes  27  -RB  23  (Pended)   -RW       User Key  (r) = Recorded By, (t) = Taken By, (c) = Cosigned By    Initials Name Provider Type    RB Rhoda Stevens OT Occupational Therapist    RW Marisol Bhakta, SHEFALI Student PT Student        Therapy Charges for Today     Code Description Service Date Service Provider Modifiers Qty    76059013285 HC OT SELF CARE/MGMT/TRAIN EA 15 MIN 4/23/2021 Rhoda Stevens OT GO 2               Rhoda Stevens OT  4/23/2021   no

## 2021-04-23 NOTE — PLAN OF CARE
Pt with soiled bed linens from diarrhea on arrival. Mod A to transfer to EOB. Mod A to stand and Max A to transfer from EOB to BSC safely. Total A for hygiene and further toileting on commode. Pt stood off commode with Mod A and Max A to transfer to bedside chair next to commode. Pt with increased difficulty with therapy this morning compared to previous sessions. OT recommends continued skilled inpatient OT services with a d/c to SNF.    Patient was wearing a face mask during this therapy encounter. Therapist used appropriate personal protective equipment including mask, goggles and gloves.  Mask used was standard procedure mask. Appropriate PPE was worn during the entire therapy session. Hand hygiene was completed before and after therapy session. Patient is not in enhanced droplet precautions.

## 2021-04-23 NOTE — PROGRESS NOTES
"  Infectious Diseases Progress Note    Tino Nagel MD     [facility]  [place]: 9 days  Patient Identification:  Name: Dilip Verma  Age: 71 y.o.  Sex: male  :  1950  MRN: [id number]         Primary Care Physician: Dakota Avila MD            Subjective: Doing well no specific complaints hoping to get to facility today.  Interval History: See consultation note.    Objective:    Scheduled Meds:aspirin, 81 mg, Oral, Daily  bisacodyl, 10 mg, Rectal, Daily  bumetanide, 2 mg, Oral, BID  carvedilol, 12.5 mg, Oral, BID With Meals  enoxaparin, 40 mg, Subcutaneous, Q24H  insulin glargine, 28 Units, Subcutaneous, QAM  insulin lispro, 0-14 Units, Subcutaneous, TID AC  insulin lispro, 6 Units, Subcutaneous, TID With Meals  polyethylene glycol, 17 g, Oral, Daily  QUEtiapine, 25 mg, Oral, Nightly  sodium chloride, 10 mL, Intravenous, Q12H  spironolactone, 25 mg, Oral, Daily  tamsulosin, 0.4 mg, Oral, BID      Continuous Infusions:     Vital signs in last 24 hours:  Temp:  [96.5 °F (35.8 °C)-97.6 °F (36.4 °C)] 97.6 °F (36.4 °C)  Heart Rate:  [60] 60  Resp:  [18] 18  BP: (132-158)/(45-84) 154/45    Intake/Output:    Intake/Output Summary (Last 24 hours) at 2021 1050  Last data filed at 2021 0845  Gross per 24 hour   Intake 720 ml   Output 2950 ml   Net -2230 ml       Exam:  /45 (BP Location: Left arm, Patient Position: Sitting)   Pulse 60   Temp 97.6 °F (36.4 °C) (Oral)   Resp 18   Ht 175.3 cm (69.02\")   Wt 86.3 kg (190 lb 4.1 oz)   SpO2 96%   BMI 28.08 kg/m²   Patient is examined using the personal protective equipment as per guidelines from infection control for this particular patient as enacted.  Hand washing was performed before and after patient interaction.  General Appearance:    Alert, cooperative, no distress, AAOx3                          Head:    Normocephalic, without obvious abnormality, atraumatic                           Eyes:    PERRL, conjunctivae/corneas clear, " EOM's intact, both eyes                         Throat:   Lips, tongue, gums normal; oral mucosa pink and moist                           Neck:   Supple, symmetrical, trachea midline, no JVD                         Lungs:    Clear to auscultation bilaterally, respirations unlabored                 Chest Wall:    No tenderness or deformity                          Heart:    Regular rate and rhythm, S1 and S2 normal, no murmur                  Abdomen:     Soft, non-tender, bowel sounds active, no masses, Hopkins catheter in place                 Extremities:   Extremities normal, atraumatic, no cyanosis or edema                        Pulses:   Pulses palpable in all extremities                            Skin:   Skin is warm and dry,  no rashes or palpable lesions                  Neurologic: Grossly nonfocal examination.    Data Review:    I reviewed the patient's new clinical results.  Results from last 7 days   Lab Units 04/23/21  0606   PLATELETS 10*3/mm3 180     Results from last 7 days   Lab Units 04/22/21  0644 04/21/21  0431 04/20/21  0535 04/19/21  0654 04/18/21  0732 04/17/21  0722   SODIUM mmol/L 137 136 135* 136 136 136   POTASSIUM mmol/L 4.1 3.9 3.4* 3.5 3.5 3.9   CHLORIDE mmol/L 96* 94* 90* 89* 89* 89*   CO2 mmol/L 28.8 32.2* 36.4* 37.9* 38.0* 37.5*   BUN mg/dL 42* 42* 44* 39* 36* 32*   CREATININE mg/dL 1.20 1.56* 1.64* 1.58* 1.32* 1.44*   CALCIUM mg/dL 9.1 8.9 9.2 9.0 8.8 9.4   GLUCOSE mg/dL 215* 207* 250* 231* 338* 257*     Microbiology Results (last 10 days)     Procedure Component Value - Date/Time    COVID PRE-OP / PRE-PROCEDURE SCREENING ORDER (NO ISOLATION) - Swab, Nasopharynx [[id number]]  (Normal) Collected: 04/16/21 2030    Lab Status: Final result Specimen: Swab from Nasopharynx Updated: 04/16/21 2122    Narrative:      The following orders were created for panel order COVID PRE-OP / PRE-PROCEDURE SCREENING ORDER (NO ISOLATION) - Swab, Nasopharynx.  Procedure                                Abnormality         Status                     ---------                               -----------         ------                     COVID-19,[facility]-HOUSE...[[id number]]  Normal              Final result                 Please view results for these tests on the individual orders.    COVID-19,[facility]-HOU[facility] CEPHEID, NP SWAB IN TRANSPORT MEDIA 8-12 HR TAT - Swab, Nasopharynx [[id number]]  (Normal) Collected: 04/16/21 2030    Lab Status: Final result Specimen: Swab from Nasopharynx Updated: 04/16/21 2122     COVID19 Not Detected    Narrative:      Fact sheet for providers: https://www.fda.gov/media/019290/download     Fact sheet for patients: https://www.fda.gov/media/799939/download    Urine Culture - Urine, Urine, Catheter [[id number]]  (Abnormal)  (Susceptibility) Collected: 04/14/21 0610    Lab Status: Final result Specimen: Urine, Catheter Updated: 04/16/21 0936     Urine Culture >100,000 CFU/mL Klebsiella pneumoniae ESBL     Comment: Consider infectious disease consult.  Susceptibility results may not correlate to clinical outcomes.       Susceptibility      Klebsiella pneumoniae ESBL      KAT      Amikacin Susceptible      Ertapenem Susceptible      Gentamicin Resistant      Levofloxacin Resistant      Meropenem Susceptible      Nitrofurantoin Resistant      Piperacillin + Tazobactam Resistant      Tetracycline Resistant      Tobramycin Intermediate      Trimethoprim + Sulfamethoxazole Resistant               Linear View                           Assessment:    Type 2 diabetes mellitus with hyperglycemia, with long-term current use of insulin (CMS/[facility])    CAD (coronary artery disease)    Acute on chronic diastolic CHF (congestive heart failure) (CMS/[facility])    CSA (central sleep apnea)    HTN (hypertension)    Urinary retention    Acute on chronic renal failure (CMS/[facility])    Acute on chronic diastolic (congestive) heart failure (CMS/[facility])    Bacteriuria    Rectal pain  Recommendations/discussion:   · Continue to  observe off of antibiotic therapy.  · Watch closely for evidence of local or systemic infection that could be attributed to urinary tract infection such as altered mental status fever local discomfort and pain and intolerance to Hopkins catheter.    Tino Nagel MD  4/23/2021  10:50 EDT    Much of this encounter note is an electronic transcription/translation of spoken language to printed text. The electronic translation of spoken language may permit erroneous, or at times, nonsensical words or phrases to be inadvertently transcribed; Although I have reviewed the note for such errors, some may still exist

## 2021-04-23 NOTE — PLAN OF CARE
Goal Outcome Evaluation:  VSS, on 2L O2, patient did not sleep due to constant restless leg/ nerve painin left hip and bilateral legs, tylenol given, Admitting paged, patient requested MD's wake him and wife on rounds to discuss the issue, place to D/C when bed ready, will CTM

## 2021-04-23 NOTE — PROGRESS NOTES
Name: Dilip Verma ADMIT: 2021   : 1950  PCP: Dakota Avila MD    MRN: [id number] LOS: 9 days   AGE/SEX: 71 y.o. male  ROOM: [facility]     Subjective   Subjective   Not sleeping well. Seen by psychiatry today and started on Seroquel. Eating fair. Plan to transfer to SNF today    Review of Systems   Constitutional: Negative for fever.   HENT: Negative for congestion.    Respiratory: Negative for shortness of breath.    Cardiovascular: Negative for chest pain.   Gastrointestinal: Negative for nausea.   Genitourinary: Negative for difficulty urinating.   Musculoskeletal: Negative for arthralgias and myalgias.   Skin: Negative for rash.   Neurological: Positive for weakness.   Psychiatric/Behavioral: Positive for sleep disturbance.        Objective   Objective   Vital Signs  Temp:  [96.5 °F (35.8 °C)-97.6 °F (36.4 °C)] 97.6 °F (36.4 °C)  Heart Rate:  [60] 60  Resp:  [18] 18  BP: (101-158)/(45-84) 101/60  SpO2:  [95 %-99 %] 95 %  on  Flow (L/min):  [1-2] 1;   Device (Oxygen Therapy): nasal cannula  Body mass index is 27.16 kg/m².  Physical Exam  Vitals and nursing note reviewed.   Constitutional:       General: He is not in acute distress.     Appearance: He is ill-appearing.      Comments: Chronically ill appearing   HENT:      Head: Normocephalic.      Mouth/Throat:      Mouth: Mucous membranes are moist.   Eyes:      Conjunctiva/sclera: Conjunctivae normal.   Cardiovascular:      Rate and Rhythm: Normal rate and regular rhythm.   Pulmonary:      Effort: Pulmonary effort is normal.      Breath sounds: Normal breath sounds.   Abdominal:      Palpations: Abdomen is soft.   Musculoskeletal:      Cervical back: Neck supple.      Right lower leg: No edema.      Left lower leg: No edema.   Skin:     General: Skin is warm and dry.      Comments: Scabbed lesions rt knee   Neurological:      Mental Status: He is alert and oriented to person, place, and time.      Motor: Weakness present.   Psychiatric:          Mood and Affect: Mood normal.         Behavior: Behavior normal.         Results Review     I reviewed the patient's new clinical results.  Results from last 7 days   Lab Units 04/23/21  0606   PLATELETS 10*3/mm3 180     Results from last 7 days   Lab Units 04/23/21  1027 04/22/21  0644 04/21/21  0431 04/20/21  0535   SODIUM mmol/L 138 137 136 135*   POTASSIUM mmol/L 4.0 4.1 3.9 3.4*   CHLORIDE mmol/L 93* 96* 94* 90*   CO2 mmol/L 34.9* 28.8 32.2* 36.4*   BUN mg/dL 42* 42* 42* 44*   CREATININE mg/dL 1.47* 1.20 1.56* 1.64*   GLUCOSE mg/dL 237* 215* 207* 250*   Estimated Creatinine Clearance: 53.6 mL/min (A) (by C-G formula based on SCr of 1.47 mg/dL (H)).    Results from last 7 days   Lab Units 04/23/21  1027 04/22/21  0644 04/21/21  0431 04/20/21  0535   CALCIUM mg/dL 9.6 9.1 8.9 9.2       COVID19   Date Value Ref Range Status   04/16/2021 Not Detected Not Detected - Ref. Range Final   12/16/2020 Not Detected Not Detected - Ref. Range Final     Glucose   Date/Time Value Ref Range Status   04/23/2021 1040 243 (H) 70 - 130 mg/dL Final   04/23/2021 0636 311 (H) 70 - 130 mg/dL Final   04/22/2021 2113 172 (H) 70 - 130 mg/dL Final   04/22/2021 1605 168 (H) 70 - 130 mg/dL Final   04/22/2021 1042 260 (H) 70 - 130 mg/dL Final   04/22/2021 0540 224 (H) 70 - 130 mg/dL Final   04/21/2021 2122 141 (H) 70 - 130 mg/dL Final       XR Foot 3+ View Left, XR Ankle 3+ View Left  Narrative: LEFT ANKLE, LEFT FOOT     HISTORY: Left ankle and foot pain since December 2020, patient fell.     COMPARISON: None.     FINDINGS:  LEFT ANKLE: 3 VIEWS: There is generalized soft tissue swelling about the  distal calf, ankle, hindfoot. Microvascular calcifications are present.  There is a small degenerative plantar calcaneal spur. There is a chronic  appearing avulsion fracture at the dorsal talar head with avulsion  fragment measuring approximately 11 mm AP by 3 mm in height.     LEFT FOOT: 3 VIEWS: There is generalized soft tissue swelling about  the  left foot. Midfoot alignment appears within normal limits. There is mild  hallux posterior deformity. Bipartite medial hallux sesamoid is present.  There is no evidence for fracture or acute abnormality.     Impression: 1. Generalized soft tissue swelling about the left distal calf, ankle,  foot.  2. Chronic appearing avulsion fracture of the dorsal talar head.  3. No evidence for acute fracture or acute osseous abnormality   4. Microvascular calcifications.     This report was finalized on 4/16/2021 2:07 PM by Dr. Zechariah Falcon M.D.     XR Abdomen KUB  Narrative: KUB     HISTORY: Left ankle and foot pain. Lower abdominal pain.     COMPARISON: None.     FINDINGS: There is mild to moderate stool throughout the colon. There  are no air-filled dilated bowel loops to suggest obstruction. There is  mild gaseous distention of the stomach. There is a catheter extending to  the central pelvis. Surgical clips are present in the upper abdomen.  There has been gamma nail and cerclage wire fixation of a left proximal  femoral fracture. Fracture extends through the intertrochanteric region  into the lateral subtrochanteric region with varus angulation. There is  no clear bony union.     Impression: 1. Mild-to-moderate stool throughout the colon. Nonobstructive bowel gas  pattern. Mild gaseous distention of stomach.  2. Previous gamma nail and cerclage wire fixation of the left proximal  femoral fracture that does not appear united and exhibits varus  angulation.     This report was finalized on 4/16/2021 1:44 PM by Dr. Zechariah Falcon M.D.       Scheduled Medications  aspirin, 81 mg, Oral, Daily  bisacodyl, 10 mg, Rectal, Daily  bumetanide, 1 mg, Oral, BID  carvedilol, 12.5 mg, Oral, BID With Meals  enoxaparin, 40 mg, Subcutaneous, Q24H  insulin glargine, 28 Units, Subcutaneous, QAM  insulin lispro, 0-14 Units, Subcutaneous, TID AC  insulin lispro, 6 Units, Subcutaneous, TID With Meals  polyethylene glycol, 17 g,  Oral, Daily  QUEtiapine, 25 mg, Oral, Nightly  sodium chloride, 10 mL, Intravenous, Q12H  spironolactone, 25 mg, Oral, Daily  tamsulosin, 0.4 mg, Oral, BID    Infusions   Diet  Diet Regular; Consistent Carbohydrate, Low Sodium; No Added Salt       Assessment/Plan     Active Hospital Problems    Diagnosis  POA   • Bacteriuria [R82.71]  Unknown   • Rectal pain [K62.89]  Unknown   • Acute on chronic diastolic (congestive) heart failure (CMS/[facility]) [I50.33]  Yes   • Acute on chronic renal failure (CMS/[facility]) [N17.9, N18.9]  Unknown   • Urinary retention [R33.9]  Yes   • HTN (hypertension) [I10]  Yes   • CSA (central sleep apnea) [G47.31]  Yes   • Acute on chronic diastolic CHF (congestive heart failure) (CMS/[facility]) [I50.33]  Unknown   • CAD (coronary artery disease) [I25.10]  Yes   • Type 2 diabetes mellitus with hyperglycemia, with long-term current use of insulin (CMS/[facility]) [E11.65, Z79.4]  Not Applicable      Resolved Hospital Problems   No resolved problems to display.         Type 2 diabetes mellitus with hyperglycemia, with long-term current use of insulin      -Continue current regimen of Lantus, correctional scale insulin; patient and wife have been refusing meal-time insulin at times   -Follow up with endocrinologist for continued outpatient management of DM. A1C 7.6%     D/W patient and wife     Loren Carranza APRLESLYE  [facility]  04/23/21  14:46 EDT

## 2021-04-23 NOTE — PROGRESS NOTES
Access Ctr Note.    Updated chart entries reviewed.     Pt seen by Psychiatrist, Dr. Ballesteros, this day. See note dated the same.

## 2021-04-23 NOTE — PROGRESS NOTES
PROGRESS NOTE      Name: Dilip Verma ADMIT: 2021   : 1950  PCP: Dakota Avila MD    MRN: [id number] LOS: 9 days   AGE/SEX: 71 y.o. male  ROOM: [address] .    Patient seen and examined.  Renal function stable.  Patient is developing alkalosis.         INTERVAL History      Patient is diuresing well        REVIEW OF SYSTEMS  Constitutional: + weakness.  HEENT:           No headache, otalgia, itchy eyes, nasal discharge or sore throat.  Cardiac:           No chest pain, orthopnea or PND. +dyspnea  Chest:              No cough, phlegm or wheezing.  Abdomen:        No abdominal pain, nausea or vomiting.  Neuro:             No focal weakness, abnormal movements or seizure-like activity.  :                  History of difficulty urinating  Ext:                  + swelling (improving)  ROS was otherwise negative except as mentioned in the [place].       Medications Prior to Admission   Medication Sig Dispense Refill Last Dose   • aspirin 81 MG EC tablet Take 81 mg by mouth Daily.   2021 at Unknown time   • carvedilol (COREG) 12.5 MG tablet Take one tablet twice daily with meals 180 tablet 2 2021 at Unknown time   • Cholecalciferol (Vitamin D) 125 MCG (5000 UT) capsule capsule Take 5,000 Units by mouth Daily.   2021 at Unknown time   • furosemide (LASIX) 40 MG tablet Take 1.5 tablets by mouth 2 (Two) Times a Day.   2021 at Unknown time   • hydrALAZINE (APRESOLINE) 25 MG tablet Take 1 tablet by mouth 2 (Two) Times a Day As Needed (for SBP > 160). 90 tablet 0 2021 at Unknown time   • Insulin Glargine, 2 Unit Dial, (TOUJEO) 300 UNIT/ML solution pen-injector injection Inject 28 Units under the skin into the appropriate area as directed Daily.   2021 at Unknown time   • insulin lispro (humaLOG) 100 UNIT/ML injection Inject 0-14 Units under the skin into the appropriate area as directed 3 (Three) Times a Day Before Meals.  12 2021 at Unknown time   • melatonin 3  "MG tablet Take 9 mg by mouth Every Night.   Patient Taking Differently at Unknown time   • Oxymetazoline HCl (AFRIN NASAL SPRAY NA) into the nostril(s) as directed by provider.   4/11/2021 at Unknown time   • tamsulosin (FLOMAX) 0.4 MG capsule 24 hr capsule TAKE 2 CAPSULES BY MOUTH AT BEDTIME  (Patient taking differently: Take 1 capsule by mouth 2 (Two) Times a Day.) 60 capsule 0 Patient Taking Differently at Unknown time   • sennosides-docusate (senna-docusate sodium) 8.6-50 MG per tablet Take 2 tablets by mouth 2 (two) times a day. 56 tablet 0 Unknown at Unknown time     Allergies:  Ace inhibitors, Clonidine derivatives, Losartan, Amlodipine, Ativan [lorazepam], Xanax [alprazolam], Minoxidil, and Tetracycline     Objective   PHYSICAL EXAM  Trace lower extremity edema  /45 (BP Location: Left arm, Patient Position: Sitting)   Pulse 60   Temp 97.6 °F (36.4 °C) (Oral)   Resp 18   Ht 175.3 cm (69.02\")   Wt 86.3 kg (190 lb 4.1 oz)   SpO2 96%   BMI 28.08 kg/m²   Intake/Output last 3 shifts:  I/O last 3 completed shifts:  In: 960 [P.O.:960]  Out: 3225 [Urine:3225]  Intake/Output this shift:  No intake/output data recorded.  General:  Chronically ill appearing,  male in no acute distress.    Head:      Normocephalic and atraumatic.    Eyes:      PERRL/EOM intact, conjunctiva and sclera clear with out nystagmus.    Neck:      No masses, thyromegaly,  trachea central with normal respiratory effort and PMI displaced laterally  Lungs:    Clear bilaterally to auscultation.    Heart:      Regular rate and rhythm, no murmur no gallop  Abd:        Soft, nontender, not distended, bowel sounds positive, no shifting dullness.  Msk:        No deformity or scoliosis noted of thoracic or lumbar spine.    Pulses:   Pulses normal in all 4 extremities.    Extr:        No cyanosis or clubbing, trace edema.    Neuro:    No focal deficits.   alert oriented x3  Skin:       Intact without lesions or rashes.    Psych:    Alert and " cooperative; normal mood and affect; normal attention span and concentration.            LABS:    CBC    Results from last 7 days   Lab Units 04/23/21  0606   PLATELETS 10*3/mm3 180     BMP   Results from last 7 days   Lab Units 04/22/21  0644 04/21/21  0431 04/20/21  0535 04/19/21  0654 04/18/21  0732 04/17/21  0722   SODIUM mmol/L 137 136 135* 136 136 136   POTASSIUM mmol/L 4.1 3.9 3.4* 3.5 3.5 3.9   CHLORIDE mmol/L 96* 94* 90* 89* 89* 89*   CO2 mmol/L 28.8 32.2* 36.4* 37.9* 38.0* 37.5*   BUN mg/dL 42* 42* 44* 39* 36* 32*   CREATININE mg/dL 1.20 1.56* 1.64* 1.58* 1.32* 1.44*   GLUCOSE mg/dL 215* 207* 250* 231* 338* 257*     CMP   Results from last 7 days   Lab Units 04/22/21  0644 04/21/21  0431 04/20/21  0535 04/19/21  0654 04/18/21  0732 04/17/21  0722   SODIUM mmol/L 137 136 135* 136 136 136   POTASSIUM mmol/L 4.1 3.9 3.4* 3.5 3.5 3.9   CHLORIDE mmol/L 96* 94* 90* 89* 89* 89*   CO2 mmol/L 28.8 32.2* 36.4* 37.9* 38.0* 37.5*   BUN mg/dL 42* 42* 44* 39* 36* 32*   CREATININE mg/dL 1.20 1.56* 1.64* 1.58* 1.32* 1.44*   GLUCOSE mg/dL 215* 207* 250* 231* 338* 257*     ABG      Imaging Results (Last 24 Hours)     ** No results found for the last 24 hours. **          Results for orders placed during the hospital encounter of 04/02/21    Adult Transthoracic Echo Complete w/ Color, Spectral and Contrast if Necessary Per Protocol    Interpretation Summary  · Left ventricular ejection fraction appears to be 51 - 55%. Left ventricular systolic function is low normal. Normal left ventricular cavity size noted. Left ventricular wall thickness is consistent with moderate concentric hypertrophy. All left ventricular wall segments contract normally. Left ventricular diastolic function is consistent with (grade II w/high LAP) pseudonormalization.  · Calculated right ventricular systolic pressure from tricuspid regurgitation is 70.1 mmHg.      Assessment/Plan   Assessment:        Type 2 diabetes mellitus with hyperglycemia, with  long-term current use of insulin (CMS/[facility])    CAD (coronary artery disease)    Acute on chronic diastolic CHF (congestive heart failure) (CMS/[facility])    CSA (central sleep apnea)    HTN (hypertension)    Urinary retention    Acute on chronic renal failure (CMS/[facility])    Acute on chronic diastolic (congestive) heart failure (CMS/[facility])    Bacteriuria    Rectal pain    · Acute kidney injury, with CKD, admission creatinine 1.51 on 4/12, creatinine 4/9 1.72, cr 1.12 on 3/19 Urine studies not available. AMENA possible pre-renal with CHF, fluid overload on exam, recent UTIs, less likely obstruction with light catheter in place draining urine.   · Chronic kidney disease, stage III, baseline creatinine noted around 1.3 on 08/2020. CKD most likely secondary to atherosclerotic disease.   · Acid/base stable  · Electrolytes  · Volume, overload on exam   · Echocardiogram  · Left ventricular ejection fraction appears to be 51 - 55%. Left ventricular systolic function is low normal. Normal left ventricular cavity size noted. Left ventricular wall thickness is consistent with moderate concentric hypertrophy. All left ventricular wall segments contract normally. Left ventricular diastolic function is consistent with (grade II w/high LAP) pseudonormalization.  · Right ventricular systolic pressure from tricuspid regurgitation is 70.1 mmHg  · BP, hypertensive - expect some improvement with aggressive diuresis   · Anemia  · Acute on chronic diastolic CHF  · GNB UTI  · Chronic right sided CHF, severe PHTN, RVSP 70mmHg  · CAD s/p CABG  · COCO,CSA on BiPap  · IDDM2  · Urinary retention, history of UTI  · History of orthopedic fractures, non-weight bearing status     Plan:      ? Renal function is stable.    Creatinine at baseline 1.47, potassium is stable.   ? Good urine output, volume is much better, edema has resolved.  ? Now developing metabolic alkalosis due to contraction.  ? Will decrease Bumex 1 mg p.o. twice a day.  ? continue aldactone 25  mg po daily  ? Urology input appreciated  ? I had again extensive d/w pt and wife about intermittent self cath, pt still not ready  ? UOP fair  ? Strict I/O  ? Daily weights  ? Avoid nephrotoxic medications  ? Patient with indwelling light catheter  ? Discussed with patient and spouse at bedside.   ? Cardiology plan noted.  ? Okay to discharge from renal standpoint  ? Will need outpatient follow up with our office in 2-3 weeks, office to call and set up appointment.   ? Discharge on Bumex 1mg PO BID.   ? Aldactone 25 mg p.o. daily.  ? Thank you for allowing me to participate in Mr. Verma' care.         Cristóbal Mayen MD  [facility]  4/23/2021  09:30 EDT
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
## 2021-04-23 NOTE — THERAPY TREATMENT NOTE
"Patient Name: Dilip Verma  : 1950    MRN: [id number]                              Today's Date: 2021       Admit Date: 2021    Visit Dx: No diagnosis found.  Patient Active Problem List   Diagnosis   • Anxiety   • Colon polyp   • Type 2 diabetes mellitus with hyperglycemia, with long-term current use of insulin (CMS/[facility])   • Erectile dysfunction   • Hyperlipidemia   • CAD (coronary artery disease)   • Hx of CABG   • Acute on chronic diastolic CHF (congestive heart failure) (CMS/[facility])   • Hypersomnia due to medical condition   • CSA (central sleep apnea)   • Periodic breathing   • HTN (hypertension)   • History of stroke   • CKD (chronic kidney disease) stage 3, GFR 30-59 ml/min (CMS/[facility])   • Urinary retention   • Acute on chronic renal failure (CMS/[facility])   • Acute on chronic diastolic (congestive) heart failure (CMS/[facility])   • Bacteriuria   • Rectal pain     Past Medical History:   Diagnosis Date   • AMENA (acute kidney injury) (CMS/[facility]) 12/3/2020   • Anxiety    • Chronic diastolic (congestive) heart failure (CMS/[facility])    • Chronic kidney disease     stones- had lithotripsy   • CKD (chronic kidney disease) stage 3, GFR 30-59 ml/min (CMS/[facility]) 2020   • Closed nondisplaced intertrochanteric fracture of left femur (CMS/HCC) 2020   • Colon polyp    • Coronary artery disease     CABG 2019   • Diabetes mellitus, type II (CMS/[facility])    • Erectile dysfunction    • H/O bone density study never   • Hyperlipidemia    • Hypersomnia    • Hypertension    • Kidney stone    • Orthostasis    • Periodic breathing    • Routine eye exam    • Sleep apnea     bipap   • Stroke (CMS/[facility])     \"slight stroke\"     Past Surgical History:   Procedure Laterality Date   • CARDIAC CATHETERIZATION N/A 7/15/2019    Procedure: Coronary angiography;  Surgeon: Carrie Price MD;  Location: [facility] INVASIVE LOCATION;  Service: Cardiovascular   • CARDIAC CATHETERIZATION N/A 7/15/2019    Procedure: Left Heart Cath;  " Surgeon: Carrie Price MD;  Location: [facility] CATH INVASIVE LOCATION;  Service: Cardiovascular   • CARDIAC CATHETERIZATION N/A 7/15/2019    Procedure: Left ventriculography;  Surgeon: Carrie Price MD;  Location: [facility] CATH INVASIVE LOCATION;  Service: Cardiovascular   • CARDIAC CATHETERIZATION  7/15/2019    Procedure: Functional Flow [place];  Surgeon: Carrie Price MD;  Location: [facility] CATH INVASIVE LOCATION;  Service: Cardiovascular   • CARDIAC CATHETERIZATION N/A 11/6/2019    Procedure: Right and Left Heart Cath;  Surgeon: Mya Smith MD;  Location: [facility] CATH INVASIVE LOCATION;  Service: Cardiovascular   • CARDIAC CATHETERIZATION N/A 11/6/2019    Procedure: Coronary angiography;  Surgeon: Mya Smith MD;  Location: [facility] CATH INVASIVE LOCATION;  Service: Cardiovascular   • CATARACT EXTRACTION EXTRACAPSULAR W/ INTRAOCULAR LENS IMPLANTATION Bilateral    • COLONOSCOPY  01/2015    dr jimenez   • CORONARY ARTERY BYPASS GRAFT N/A 7/18/2019    Procedure: INTRAOPERATIVE SHOAIB; STERNOTOMY CORONARY ARTERY BYPASS x 3  USING LEFT INTERNAL MAMMARY ARTERY GRAFT UTILIZING ENDOSCOPICALLY HARVESTED RIGHT GREATER SAPHENOUS VEIN AND PRP.;  Surgeon: Bill Devi MD;  Location: [facility] OR;  Service: Cardiothoracic   • DENTAL PROCEDURE      3 surgeries inder implants   • FEMUR IM NAILING/RODDING Left 12/3/2020    Procedure: LEFT HIP INTRAMEDULLARY NAIL;  Surgeon: Niraj Ravi MD;  Location: [facility] MAIN OR;  Service: Orthopedic Spine;  Laterality: Left;   • HERNIA REPAIR      inguinal bilaterally   • KIDNEY STONE SURGERY      lithotripsy     General Information     Row Name 04/23/21 0927          Physical Therapy Time and Intention    Document Type  therapy note (daily note)  (Pended)   -RW     Mode of Treatment  physical therapy  (Pended)   -RW     Row Name 04/23/21 0927          Cognition    Orientation Status (Cognition)  oriented x 3  (Pended)   -RW       User Key  (r) = Recorded By, (t) = Taken By, (c) =  Cosigned By    Initials Name Provider Type    RW Marisol Bhakta, PT Student PT Student        Mobility     Row Name 04/23/21 0927          Bed Mobility    Bed Mobility  sit-supine  (Pended)   -RW     Sit-Supine [place] (Bed Mobility)  maximum assist (25% patient effort);verbal cues;nonverbal cues (demo/gesture);2 person assist  (Pended)   -RW     Assistive Device (Bed Mobility)  draw sheet  (Pended)   -RW     Comment (Bed Mobility)  Pt req v/tc to cross arms and lift head to transfer pt to HOB.  (Pended)   -RW     Row Name 04/23/21 0927          Transfers    Comment (Transfers)  STS x2 w/ vc to place B feet wider and for upright posture. Extra rime to place hands on rolator .  (Pended)   -RW     Row Name 04/23/21 0927          Sit-Stand Transfer    Sit-Stand [place] (Transfers)  minimum assist (75% patient effort);verbal cues;nonverbal cues (demo/gesture);2 person assist  (Pended)   -RW     Assistive Device (Sit-Stand Transfers)  walker, 4-wheeled  (Pended)   -RW     Row Name 04/23/21 0927          Gait/Stairs (Locomotion)    [place] Level (Gait)  minimum assist (75% patient effort);moderate assist (50% patient effort);1 person assist;1 person to manage equipment  (Pended)  PTA pushing chair and oxygen behind pt.  -RW     Assistive Device (Gait)  walker, 4-wheeled  (Pended)   -RW     Distance in Feet (Gait)  38' w/ a 4 min seated rest break; 38' back to bed  (Pended)   -RW     Deviations/Abnormal Patterns (Gait)  base of support, narrow;left sided deviations;weight shifting decreased;gait speed decreased;farhana decreased;stride length decreased;antalgic  (Pended)   -RW     Bilateral Gait Deviations  forward flexed posture;knee buckling, left side  (Pended)   -RW     Left Sided Gait Deviations  heel strike decreased;knee hyperextension  (Pended)   -RW     [place] Level (Stairs)  not tested  (Pended)   -RW     Comment (Gait/Stairs)  Pt req v/tc for upright posture and AD positioning. PTA  pushing chair and O2 behind pt. Pt unsteady w/ 1 LOB w/ Min A to correct.  (Pended)   -       User Key  (r) = Recorded By, (t) = Taken By, (c) = Cosigned By    Initials Name Provider Type    Marisol Ogden, PT Student PT Student        Obj/Interventions     Row Name 04/23/21 0934          Balance    Comment, Balance  Pt unsteady at times w/ no LOB  (Pended)   -       User Key  (r) = Recorded By, (t) = Taken By, (c) = Cosigned By    Initials Name Provider Type    Marisol Ogden, PT Student PT Student        Goals/Plan    No documentation.       Clinical Impression     Row Name 04/23/21 0935          Pain    Additional Documentation  Pain Scale: FACES Pre/Post-Treatment (Group)  (Pended)   -     Row Name 04/23/21 0935          Pain Scale: FACES Pre/Post-Treatment    Pain: FACES Scale, Pretreatment  2-->hurts little bit  (Pended)   -RW     Posttreatment Pain Rating  4-->hurts little more  (Pended)   -RW     Pain Location - Side  Bilateral  (Pended)   -     Pain Location  hip;knee;foot;shoulder  (Pended)   -RW     Pre/Posttreatment Pain Comment  Pain in LLE and shoulders while amb.  (Pended)   -     Row Name 04/23/21 0935          Plan of Care Review    Plan of Care Reviewed With  patient;spouse  (Pended)   -     Progress  improving  (Pended)   -     Outcome Summary  Pt amb inc to 38' w/ a seated rest break and then 38' back to bed during PT w/ Min/mod A x1 w/ Rolator. Pt unsteady at times w/ 1 LOB and Min A to correct. Pt req encouragement and v/tc for BLE and AD positioning. PTA pushing chair and O2 behind pt while amb.  Pt will benefit from PT as amirah.  (Pended)   -     Row Name 04/23/21 0935          Vital Signs    Pre SpO2 (%)  96  (Pended)   -RW     O2 Delivery Pre Treatment  supplemental O2  (Pended)   -RW     Intra SpO2 (%)  95  (Pended)   -RW     O2 Delivery Intra Treatment  supplemental O2  (Pended)   -RW     Post SpO2 (%)  98  (Pended)   -RW     O2 Delivery Post Treatment  supplemental O2   (Pended)   -RW     Recovery Time  4  (Pended)   -RW     Rest Breaks   1  (Pended)   -RW     Row Name 04/23/21 0935          Positioning and Restraints    Pre-Treatment Position  sitting in chair/recliner  (Pended)   -RW     Post Treatment Position  bed  (Pended)   -RW     In Bed  fowlers;call light within reach;exit alarm on;with nsg  (Pended)   -       User Key  (r) = Recorded By, (t) = Taken By, (c) = Cosigned By    Initials Name Provider Type    Marisol Ogden, PT Student PT Student        Outcome Measures     Row Name 04/23/21 0945          How much help from another person do you currently need...    Turning from your back to your side while in flat bed without using bedrails?  3  (Pended)   -RW     Moving from lying on back to sitting on the side of a flat bed without bedrails?  3  (Pended)   -RW     Moving to and from a bed to a chair (including a wheelchair)?  2  (Pended)   -RW     Standing up from a chair using your arms (e.g., wheelchair, bedside chair)?  3  (Pended)   -RW     Climbing 3-5 steps with a railing?  1  (Pended)   -RW     To walk in hospital room?  2  (Pended)   -RW     AM-PAC 6 Clicks Score (PT)  14  (Pended)   - (r) RW (t)     Row Name 04/23/21 0945          Functional Assessment    Outcome Measure Options  AM-PAC 6 Clicks Basic Mobility (PT)  (Pended)   -       User Key  (r) = Recorded By, (t) = Taken By, (c) = Cosigned By    Initials Name Provider Type    Nabila Kaur, RN Registered Nurse    Marisol Ogden, PT Student PT Student        Physical Therapy Education                 Title: PT OT SLP Therapies (Done)     Topic: Physical Therapy (Done)     Point: Mobility training (Done)     Learning Progress Summary           Patient Acceptance, E,D, VU,NR by  at 4/23/2021 0945    Acceptance, E,TB,D, VU,NR,DU by  at 4/23/2021 0229    Acceptance, E,D, VU,NR by  at 4/22/2021 1356    Acceptance, E,TB,D, VU,DU,NR by  at 4/21/2021 2059    Acceptance, E,D, VU,NR by PH at  4/21/2021 1434    Acceptance, E,D, NR by  at 4/18/2021 1702   Significant Other Acceptance, E,D, VU,NR by RW at 4/23/2021 0945    Acceptance, E, VU by PH at 4/21/2021 1434                   Point: Home exercise program (Done)     Learning Progress Summary           Patient Acceptance, E,D, VU,NR by RW at 4/23/2021 0945    Acceptance, E,TB,D, VU,NR,DU by  at 4/23/2021 0229    Acceptance, E,TB,D, VU,DU,NR by  at 4/21/2021 2059    Acceptance, E,D, VU,NR by PH at 4/21/2021 1434    Acceptance, E,D, NR by  at 4/18/2021 1702   Significant Other Acceptance, E,D, VU,NR by RW at 4/23/2021 0945    Acceptance, E, VU by PH at 4/21/2021 1434                   Point: Body mechanics (Done)     Learning Progress Summary           Patient Acceptance, E,D, VU,NR by RW at 4/23/2021 0945    Acceptance, E,TB,D, VU,NR,DU by  at 4/23/2021 0229    Acceptance, E,D, VU,NR by  at 4/22/2021 1356    Acceptance, E,TB,D, VU,DU,NR by  at 4/21/2021 2059    Acceptance, E,D, VU,NR by PH at 4/21/2021 1434    Acceptance, E,D, NR by  at 4/18/2021 1702   Significant Other Acceptance, E,D, VU,NR by RW at 4/23/2021 0945    Acceptance, E, VU by PH at 4/21/2021 1434                   Point: Precautions (Done)     Learning Progress Summary           Patient Acceptance, E,D, VU,NR by RW at 4/23/2021 0945    Acceptance, E,TB,D, VU,NR,DU by  at 4/23/2021 0229    Acceptance, E,D, VU,NR by PH at 4/22/2021 1356    Acceptance, E,TB,D, VU,DU,NR by  at 4/21/2021 2059    Acceptance, E,D, VU,NR by PH at 4/21/2021 1434    Acceptance, E, NR by MD at 4/20/2021 1455    Acceptance, E, NR by MD at 4/19/2021 0928    Acceptance, E,D, NR by KATHY at 4/18/2021 1702   Significant Other Acceptance, E,D, VU,NR by RW at 4/23/2021 0945    Acceptance, E, VU by PH at 4/21/2021 1434                               User Key     Initials Effective Dates Name Provider Type Discipline    KATHY 02/05/19 -  Abi Hernandez, PT Physical Therapist PT    MD 04/03/18 -  Hayden,  Venus, PT Physical Therapist PT     04/06/17 -  Zuri Fleming, RN Registered Nurse Nurse    PH 08/20/19 -  Jagruti Yang PTA Physical Therapy Assistant PT     04/09/21 -  Marisol Bhakta, PT Student PT Student PT              PT Recommendation and Plan     Plan of Care Reviewed With: (P) patient, spouse  Progress: (P) improving  Outcome Summary: (P) Pt amb inc to 38' w/ a seated rest break and then 38' back to bed during PT w/ Min/mod A x1 w/ Rolator. Pt unsteady at times w/ 1 LOB and Min A to correct. Pt req encouragement and v/tc for BLE and AD positioning. PTA pushing chair and O2 behind pt while amb.  Pt will benefit from PT as amirah.     Time Calculation:   PT Charges     Row Name 04/23/21 0946             Time Calculation    Start Time  0837  (Pended)   -RW      Stop Time  0900  (Pended)   -RW      Time Calculation (min)  23 min  (Pended)   -RW      PT Received On  04/23/21  (Pended)   -RW      PT - Next Appointment  04/24/21  (Pended)   -RW         Timed Charges    89814 - Gait Training Minutes   23  (Pended)   -RW         Total Minutes    Timed Charges Total Minutes  23  (Pended)   -RW       Total Minutes  23  (Pended)   -RW        User Key  (r) = Recorded By, (t) = Taken By, (c) = Cosigned By    Initials Name Provider Type     Marisol Bhakta, PT Student PT Student        Therapy Charges for Today     Code Description Service Date Service Provider Modifiers Qty    34346807603 HC GAIT TRAINING EA 15 MIN 4/23/2021 Marisol Bhakta, PT Student GP 2    82830755461 HC PT THER SUPP EA 15 MIN 4/23/2021 Marisol Bhakta, PT Student GP 2          PT G-Codes  Outcome Measure Options: (P) AM-PAC 6 Clicks Basic Mobility (PT)  AM-PAC 6 Clicks Score (PT): (P) 14  AM-PAC 6 Clicks Score (OT): 12  Modified [place] Scale: 4 - Moderately severe disability.  Unable to walk without assistance, and unable to attend to own bodily needs without assistance.    SHEFALI Jimenez  4/23/2021

## 2021-04-26 NOTE — CASE MANAGEMENT/SOCIAL WORK
Case Management Discharge Note      Final Note: [facility] via [facility]. Vijay RN/CCP    Provided Post Acute Provider List?: Refused    Selected Continued Care - Discharged on 4/23/2021 Admission date: 4/12/2021 - Discharge disposition: Skilled Nursing Facility (DC - External)    Destination Coordination complete    Service Provider Selected Services Address Phone Fax Patient Preferred    [facility]  Skilled Nursing [address] [address] [phone] --          Durable Medical Equipment    No services have been selected for the patient.              Dialysis/Infusion    No services have been selected for the patient.              Home Medical Care Coordination complete    Service Provider Selected Services Address Phone Fax Patient Preferred    [facility] [address] [place] [address] [phone] --          Therapy    No services have been selected for the patient.              Community Resources    No services have been selected for the patient.                  Transportation Services  Ambulance: [facility]    Final Discharge Disposition Code: 03 - skilled nursing facility (SNF)

## 2021-05-13 ENCOUNTER — PATIENT OUTREACH (OUTPATIENT)
Dept: CASE MANAGEMENT | Facility: OTHER | Age: 71
End: 2021-05-13

## 2021-05-16 ENCOUNTER — READMISSION MANAGEMENT (OUTPATIENT)
Dept: CALL CENTER | Facility: HOSPITAL | Age: 71
End: 2021-05-16

## 2021-05-16 NOTE — OUTREACH NOTE
Prep Survey      Responses   [facility] facility patient discharged from?  Non-BH   Is LACE score < 7 ?  Non-BH Discharge   Emergency Room discharge w/ pulse ox?  No   Eligibility  [facility]   Date of Discharge  05/15/21   Discharge diagnosis  Acute on chronic diastolic (congestive) heart failure    Does the patient have one of the following disease processes/diagnoses(primary or secondary)?  CHF   Prep survey completed?  Yes          Ingrid Joseph RN

## 2021-05-17 ENCOUNTER — TRANSITIONAL CARE MANAGEMENT TELEPHONE ENCOUNTER (OUTPATIENT)
Dept: CALL CENTER | Facility: HOSPITAL | Age: 71
End: 2021-05-17

## 2021-05-17 ENCOUNTER — HOSPITAL ENCOUNTER (OUTPATIENT)
Dept: CT IMAGING | Facility: HOSPITAL | Age: 71
Discharge: HOME | End: 2021-05-17
Admitting: INTERNAL MEDICINE

## 2021-05-17 DIAGNOSIS — R59.1 LYMPHADENOPATHY SYNDROME: ICD-10-CM

## 2021-05-17 PROCEDURE — 71250 CT THORAX DX C-: CPT

## 2021-05-17 NOTE — OUTREACH NOTE
Call Center TCM Note      Responses   [facility] patient discharged from?  Non-   Does the patient have one of the following disease processes/diagnoses(primary or secondary)?  CHF   TCM attempt successful?  No   Unsuccessful attempts  Attempt 1   Call Status  Left message          Venus Soto MA    5/17/2021, 11:29 EDT

## 2021-05-17 NOTE — OUTREACH NOTE
Call Center TCM Note      Responses   [facility] patient discharged from?  Non-BH   Does the patient have one of the following disease processes/diagnoses(primary or secondary)?  CHF   TCM attempt successful?  No   Unsuccessful attempts  Attempt 2          Venus Soto MA    5/17/2021, 17:03 EDT

## 2021-05-18 ENCOUNTER — TRANSITIONAL CARE MANAGEMENT TELEPHONE ENCOUNTER (OUTPATIENT)
Dept: CALL CENTER | Facility: HOSPITAL | Age: 71
End: 2021-05-18

## 2021-05-18 NOTE — OUTREACH NOTE
Call Center TCM Note      Responses   [facility] patient discharged from?  Non-   Does the patient have one of the following disease processes/diagnoses(primary or secondary)?  CHF   TCM attempt successful?  Yes   Discharge diagnosis  Acute on chronic diastolic (congestive) heart failure    Person spoke with today (if not patient) and relationship  WIFE   Meds reviewed with patient/caregiver?  Yes   Is the patient having any side effects they believe may be caused by any medication additions or changes?  No   Does the patient have all medications ordered at discharge?  Yes   Is the patient taking all medications as directed (includes completed medication regime)?  Yes   Does the patient have a primary care provider?   Yes   Does the patient have an appointment with their PCP within 7 days of discharge?  No   Comments regarding PCP  PCP Dr Avila has no appt avail within TCM time frame. I am asking PCP ofc to please review sched and call pt for TCM FWP to be completed by 05/29/2021. Pt d/c [facility] 04/23/2021, SNF 04/23-05/15/2021.   Has the patient kept scheduled appointments due by today?  N/A   Has home health visited the patient within 72 hours of discharge?  N/A   Psychosocial issues?  No   Did the patient receive a copy of their discharge instructions?  Yes   Nursing interventions  Reviewed instructions with patient   What is the patient's perception of their health status since discharge?  Improving   Is the patient/caregiver able to teach back signs and symptoms related to disease process for when to call PCP?  Yes   Is the patient/caregiver able to teach back signs and symptoms related to disease process for when to call 911?  Yes   Is the patient/caregiver able to teach back the hierarchy of who to call/visit for symptoms/problems? PCP, Specialist, Home health nurse, Urgent Care, ED, 911  Yes   If the patient is a current smoker, are they able to teach back resources for cessation?  Not a smoker   TCM  "call completed?  Yes   Wrap up additional comments  Per wife pt doing pretty well. Starts outpt P.T. today. Seroquel d/c due to \"hallucinations\" & per wife replaced with Tramadol, strictly at hs to help pt sleep. 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 I am asking PCP [facility] to please review sched and call pt for TCM FWP to be completed by 05/29/2021. Pt d/c [facility] 04/23/2021, SNF 04/23-05/15/2021.          Venus Soto MA    5/18/2021, 10:56 EDT      "

## 2021-05-19 ENCOUNTER — PATIENT OUTREACH (OUTPATIENT)
Dept: CASE MANAGEMENT | Facility: OTHER | Age: 71
End: 2021-05-19

## 2021-05-19 NOTE — OUTREACH NOTE
Patient Outreach Note    Discussed transition from [facility] rehab discharge to home. Reports adherence to medication regimen and reports medications reconciled.  Plans to schedule follow-up appt with endocrinologist  as reported elevated blood sugar of 353 this morning and administering insulin per sliding scale parameters.  Then stated patient had eaten donuts for breakfast.  O2 sat 88% on room air this morning and using O2 at HS.  Aware to use O2 for O2 saturations of 88% or less.  Has follow-up scheduled with pulmonologist to review chest CT performed on 5/17.  She plans to schedule appt with PCP, Dr. Avila, following ortho appt with Dr. Ravi, on Friday.  FC intake and plans for outing this afternoon as patient requested need for a haircut.  First outpatient physical therapy session yesterday at [facility].  Denies further needs or concerns today.     Johan Dillard RN  Ambulatory     5/19/2021, 11:58 EDT

## 2021-05-25 ENCOUNTER — TELEPHONE (OUTPATIENT)
Dept: FAMILY MEDICINE CLINIC | Facility: CLINIC | Age: 71
End: 2021-05-25

## 2021-05-25 NOTE — TELEPHONE ENCOUNTER
PATIENT WIFE IS CALLING IN TO SCHEDULE A HOSPITAL FOLLOW UP.  PATIENT WAS DISCHARGED FROM [facility] REH[facility] ON 05/15 DUE TO HAVING 50 POUNDS OF FLUID REMOVED FROM HIM.  HE WOULD LIKE TO SEE DR TRAN      PLEASE ADVISE      [phone]

## 2021-05-26 ENCOUNTER — TELEPHONE (OUTPATIENT)
Dept: FAMILY MEDICINE CLINIC | Facility: CLINIC | Age: 71
End: 2021-05-26

## 2021-05-26 NOTE — TELEPHONE ENCOUNTER
Caller: Beny Verma    Relationship: Emergency Contact    Best call back number: [phone]    What medication are you requesting: TRAMADOL 50 MG    What are your current symptoms: NOT ABLE TO REST, PAIN FROM BROKEN HIP    How long have you been experiencing symptoms: 5-6 MONTHS    Have you had these symptoms before:    [x] Yes  [] No    Have you been treated for these symptoms before:   [x] Yes  [] No    If a prescription is needed, what is your preferred pharmacy and phone number: [id number] - [address] - [phone]  - [phone] FX     Additional notes: A DOCTOR AT REHAB HAD ORIGINALLY PRESCRIBED THIS MEDICATION. PATIENT ONLY HAS 2 PILLS LEFT.

## 2021-06-16 ENCOUNTER — OFFICE VISIT (OUTPATIENT)
Dept: FAMILY MEDICINE CLINIC | Facility: CLINIC | Age: 71
End: 2021-06-16

## 2021-06-16 VITALS
SYSTOLIC BLOOD PRESSURE: 128 MMHG | HEIGHT: 69 IN | HEART RATE: 64 BPM | WEIGHT: 181 LBS | OXYGEN SATURATION: 95 % | BODY MASS INDEX: 26.81 KG/M2 | RESPIRATION RATE: 18 BRPM | TEMPERATURE: 98 F | DIASTOLIC BLOOD PRESSURE: 70 MMHG

## 2021-06-16 DIAGNOSIS — E78.5 HYPERLIPIDEMIA, UNSPECIFIED HYPERLIPIDEMIA TYPE: Primary | ICD-10-CM

## 2021-06-16 DIAGNOSIS — M25.552 LEFT HIP PAIN: ICD-10-CM

## 2021-06-16 DIAGNOSIS — G47.00 INSOMNIA, UNSPECIFIED TYPE: ICD-10-CM

## 2021-06-16 DIAGNOSIS — N18.30 STAGE 3 CHRONIC KIDNEY DISEASE, UNSPECIFIED WHETHER STAGE 3A OR 3B CKD (HCC): ICD-10-CM

## 2021-06-16 DIAGNOSIS — E11.65 TYPE 2 DIABETES MELLITUS WITH HYPERGLYCEMIA, WITH LONG-TERM CURRENT USE OF INSULIN (HCC): ICD-10-CM

## 2021-06-16 DIAGNOSIS — I25.10 CORONARY ARTERY DISEASE INVOLVING NATIVE CORONARY ARTERY OF NATIVE HEART WITHOUT ANGINA PECTORIS: ICD-10-CM

## 2021-06-16 DIAGNOSIS — Z86.73 HISTORY OF STROKE: ICD-10-CM

## 2021-06-16 DIAGNOSIS — I10 ESSENTIAL HYPERTENSION: ICD-10-CM

## 2021-06-16 DIAGNOSIS — Z95.1 HX OF CABG: ICD-10-CM

## 2021-06-16 DIAGNOSIS — Z00.00 MEDICARE ANNUAL WELLNESS VISIT, SUBSEQUENT: ICD-10-CM

## 2021-06-16 DIAGNOSIS — R82.81 PYURIA: Primary | ICD-10-CM

## 2021-06-16 DIAGNOSIS — Z79.4 TYPE 2 DIABETES MELLITUS WITH HYPERGLYCEMIA, WITH LONG-TERM CURRENT USE OF INSULIN (HCC): ICD-10-CM

## 2021-06-16 DIAGNOSIS — F41.9 ANXIETY: ICD-10-CM

## 2021-06-16 DIAGNOSIS — F43.10 PTSD (POST-TRAUMATIC STRESS DISORDER): ICD-10-CM

## 2021-06-16 LAB
ALBUMIN SERPL-MCNC: 4.1 G/DL (ref 3.5–5.2)
ALBUMIN/GLOB SERPL: 1.1 G/DL
ALP SERPL-CCNC: 98 U/L (ref 39–117)
ALT SERPL W P-5'-P-CCNC: 19 U/L (ref 1–41)
ANION GAP SERPL CALCULATED.3IONS-SCNC: 10.6 MMOL/L (ref 5–15)
AST SERPL-CCNC: 19 U/L (ref 1–40)
BACTERIA UR QL AUTO: ABNORMAL /HPF
BILIRUB SERPL-MCNC: 0.7 MG/DL (ref 0–1.2)
BILIRUB UR QL STRIP: NEGATIVE
BUN SERPL-MCNC: 53 MG/DL (ref 8–23)
BUN/CREAT SERPL: 31 (ref 7–25)
CALCIUM SPEC-SCNC: 9.6 MG/DL (ref 8.6–10.5)
CHLORIDE SERPL-SCNC: 93 MMOL/L (ref 98–107)
CHOLEST SERPL-MCNC: 131 MG/DL (ref 0–200)
CLARITY UR: ABNORMAL
CO2 SERPL-SCNC: 30.4 MMOL/L (ref 22–29)
COLOR UR: YELLOW
CREAT SERPL-MCNC: 1.71 MG/DL (ref 0.76–1.27)
ERYTHROCYTE [DISTWIDTH] IN BLOOD BY AUTOMATED COUNT: 16.3 % (ref 12.3–15.4)
GFR SERPL CREATININE-BSD FRML MDRD: 40 ML/MIN/1.73
GLOBULIN UR ELPH-MCNC: 3.8 GM/DL
GLUCOSE SERPL-MCNC: 356 MG/DL (ref 65–99)
GLUCOSE UR STRIP-MCNC: NEGATIVE MG/DL
HCT VFR BLD AUTO: 45.1 % (ref 37.5–51)
HDLC SERPL-MCNC: 34 MG/DL (ref 40–60)
HGB BLD-MCNC: 14.7 G/DL (ref 13–17.7)
HGB UR QL STRIP.AUTO: ABNORMAL
KETONES UR QL STRIP: NEGATIVE
LDLC SERPL CALC-MCNC: 73 MG/DL (ref 0–100)
LDLC/HDLC SERPL: 2.08 {RATIO}
LEUKOCYTE ESTERASE UR QL STRIP.AUTO: ABNORMAL
LYMPHOCYTES # BLD AUTO: 1.8 10*3/MM3 (ref 0.7–3.1)
LYMPHOCYTES NFR BLD AUTO: 18.6 % (ref 19.6–45.3)
MCH RBC QN AUTO: 30.2 PG (ref 26.6–33)
MCHC RBC AUTO-ENTMCNC: 32.5 G/DL (ref 31.5–35.7)
MCV RBC AUTO: 92.8 FL (ref 79–97)
MONOCYTES # BLD AUTO: 0.4 10*3/MM3 (ref 0.1–0.9)
MONOCYTES NFR BLD AUTO: 4.5 % (ref 5–12)
NEUTROPHILS NFR BLD AUTO: 7.6 10*3/MM3 (ref 1.7–7)
NEUTROPHILS NFR BLD AUTO: 76.9 % (ref 42.7–76)
NITRITE UR QL STRIP: POSITIVE
NT-PROBNP SERPL-MCNC: 1685 PG/ML (ref 0–900)
PH UR STRIP.AUTO: 5.5 [PH] (ref 4.6–8)
PLATELET # BLD AUTO: 189 10*3/MM3 (ref 140–450)
PMV BLD AUTO: 8.1 FL (ref 6–12)
POTASSIUM SERPL-SCNC: 5.2 MMOL/L (ref 3.5–5.2)
PROT SERPL-MCNC: 7.9 G/DL (ref 6–8.5)
PROT UR QL STRIP: ABNORMAL
RBC # BLD AUTO: 4.86 10*6/MM3 (ref 4.14–5.8)
RBC # UR: ABNORMAL /HPF
REF LAB TEST METHOD: ABNORMAL
SODIUM SERPL-SCNC: 134 MMOL/L (ref 136–145)
SP GR UR STRIP: 1.01 (ref 1–1.03)
SQUAMOUS #/AREA URNS HPF: ABNORMAL /HPF
TRIGL SERPL-MCNC: 132 MG/DL (ref 0–150)
TSH SERPL DL<=0.05 MIU/L-ACNC: 2.32 UIU/ML (ref 0.27–4.2)
UROBILINOGEN UR QL STRIP: ABNORMAL
VLDLC SERPL-MCNC: 24 MG/DL (ref 5–40)
WBC # BLD AUTO: 9.9 10*3/MM3 (ref 3.4–10.8)
WBC UR QL AUTO: ABNORMAL /HPF

## 2021-06-16 PROCEDURE — 87186 SC STD MICRODIL/AGAR DIL: CPT | Performed by: FAMILY MEDICINE

## 2021-06-16 PROCEDURE — 85025 COMPLETE CBC W/AUTO DIFF WBC: CPT | Performed by: FAMILY MEDICINE

## 2021-06-16 PROCEDURE — 84443 ASSAY THYROID STIM HORMONE: CPT | Performed by: FAMILY MEDICINE

## 2021-06-16 PROCEDURE — 1170F FXNL STATUS ASSESSED: CPT | Performed by: FAMILY MEDICINE

## 2021-06-16 PROCEDURE — 87086 URINE CULTURE/COLONY COUNT: CPT | Performed by: FAMILY MEDICINE

## 2021-06-16 PROCEDURE — 36415 COLL VENOUS BLD VENIPUNCTURE: CPT | Performed by: FAMILY MEDICINE

## 2021-06-16 PROCEDURE — 80053 COMPREHEN METABOLIC PANEL: CPT | Performed by: FAMILY MEDICINE

## 2021-06-16 PROCEDURE — 87077 CULTURE AEROBIC IDENTIFY: CPT | Performed by: FAMILY MEDICINE

## 2021-06-16 PROCEDURE — 1125F AMNT PAIN NOTED PAIN PRSNT: CPT | Performed by: FAMILY MEDICINE

## 2021-06-16 PROCEDURE — 99214 OFFICE O/P EST MOD 30 MIN: CPT | Performed by: FAMILY MEDICINE

## 2021-06-16 PROCEDURE — 81001 URINALYSIS AUTO W/SCOPE: CPT | Performed by: FAMILY MEDICINE

## 2021-06-16 PROCEDURE — 1159F MED LIST DOCD IN RCRD: CPT | Performed by: FAMILY MEDICINE

## 2021-06-16 PROCEDURE — 80061 LIPID PANEL: CPT | Performed by: FAMILY MEDICINE

## 2021-06-16 PROCEDURE — 83880 ASSAY OF NATRIURETIC PEPTIDE: CPT | Performed by: FAMILY MEDICINE

## 2021-06-16 PROCEDURE — G0439 PPPS, SUBSEQ VISIT: HCPCS | Performed by: FAMILY MEDICINE

## 2021-06-16 RX ORDER — TRAMADOL HYDROCHLORIDE 50 MG/1
50 TABLET ORAL EVERY 8 HOURS PRN
Qty: 40 TABLET | Refills: 0 | Status: SHIPPED | OUTPATIENT
Start: 2021-06-16 | End: 2021-07-26

## 2021-06-16 RX ORDER — POTASSIUM CHLORIDE 750 MG/1
10 TABLET, FILM COATED, EXTENDED RELEASE ORAL DAILY
COMMUNITY
End: 2022-01-07

## 2021-06-16 RX ORDER — TAMSULOSIN HYDROCHLORIDE 0.4 MG/1
1 CAPSULE ORAL 2 TIMES DAILY
Qty: 60 CAPSULE | Refills: 2 | Status: SHIPPED | OUTPATIENT
Start: 2021-06-16 | End: 2021-10-21

## 2021-06-16 NOTE — PROGRESS NOTES
PainThe ABCs of the Annual Wellness Visit  Subsequent Medicare Wellness Visit    Chief Complaint   Patient presents with   • follow up d/c from Rehab   • Medicare Wellness-subsequent       Subjective   History of Present Illness:  Dilip Verma is a 71 y.o. male who presents for a Subsequent Medicare Wellness Visit.    HEALTH RISK ASSESSMENT    Recent Hospitalizations:  Recently treated at the following:  [facility]    Current Medical Providers:  Patient Care Team:  Dakota Avila MD as PCP - General (Family Medicine)  Morris Cai MD as Consulting Physician (Sleep Medicine)  Michaela Hylton MD as Consulting Physician (Cardiology)  Hardy Banerjee MD as Consulting Physician (Ophthalmology)  Chula Christianson MD as Consulting Physician (Ophthalmology)  Jason Sherman MD (Endocrinology)  Perla Mayen MD as Consulting Physician (Nephrology)  Federico Hebert MD as Consulting Physician (Pulmonary Disease)  Johan Dillard, RN as Ambulatory  ([facility])  Niraj Ravi MD as Consulting Physician (Orthopedic Surgery)  Papa Velasco MD as Consulting Physician (Urology)  Claudia Blackwood, RN as Ambulatory  (Population Health)    Smoking Status:  Social History     Tobacco Use   Smoking Status Former Smoker   • Packs/day: 0.75   • Years: 16.00   • Pack years: 12.00   • Quit date:    • Years since quittin.4   Smokeless Tobacco Never Used   Tobacco Comment    Start age:39/Stopping age:47, 3/4 PPD       Alcohol Consumption:  Social History     Substance and Sexual Activity   Alcohol Use No    Comment: caffeine use - 1 cup daily       Depression Screen:   PHQ-2/PHQ-9 Depression Screening 2021   Little interest or pleasure in doing things 0   Feeling down, depressed, or hopeless 0   Trouble falling or staying asleep, or sleeping too much 2   Feeling tired or having little energy 0   Poor appetite or overeating 0   Feeling bad  about yourself - or that you are a failure or have let yourself or your family down 0   Trouble concentrating on things, such as reading the newspaper or watching television 0   Moving or speaking so slowly that other people could have noticed. Or the opposite - being so fidgety or restless that you have been moving around a lot more than usual 0   Thoughts that you would be better off dead, or of hurting yourself in some way 0   Total Score 2   If you checked off any problems, how difficult have these problems made it for you to do your work, take care of things at home, or get along with other people? Very difficult       Fall Risk Screen:  MABLE Fall Risk Assessment has not been completed.    Health Habits and Functional and Cognitive Screening:  Functional & Cognitive Status 6/16/2021   Do you have difficulty preparing food and eating? Yes   Do you have difficulty bathing yourself, getting dressed or grooming yourself? Yes   Do you have difficulty using the toilet? Yes   Do you have difficulty moving around from place to place? Yes   Do you have trouble with steps or getting out of a bed or a chair? Yes   Current Diet Diabetic Diet   Dental Exam Up to date   Eye Exam Up to date   Exercise (times per week) 2 times per week   Current Exercises Include (No Data)        Exercise Comment Rehab   Current Exercise Activities Include -   Do you need help using the phone?  No   Are you deaf or do you have serious difficulty hearing?  No   Do you need help with transportation? Yes   Do you need help shopping? No   Do you need help preparing meals?  Yes   Do you need help with housework?  Yes   Do you need help with laundry? Yes   Do you need help taking your medications? Yes   Do you need help managing money? No   Do you ever drive or ride in a car without wearing a seat belt? No   Have you felt unusual stress, anger or loneliness in the last month? No   Who do you live with? Spouse   If you need help, do you have  trouble finding someone available to you? No   Have you been bothered in the last four weeks by sexual problems? No   Do you have difficulty concentrating, remembering or making decisions? No         Does the patient have evidence of cognitive impairment? No    Asprin use counseling:Taking ASA appropriately as indicated    Age-appropriate Screening Schedule:  Refer to the list below for future screening recommendations based on patient's age, sex and/or medical conditions. Orders for these recommended tests are listed in the plan section. The patient has been provided with a written plan.    Health Maintenance   Topic Date Due   • TDAP/TD VACCINES (1 - Tdap) Never done   • ZOSTER VACCINE (1 of 2) Never done   • DIABETIC FOOT EXAM  01/06/2021   • DIABETIC EYE EXAM  01/31/2021   • INFLUENZA VACCINE  08/01/2021   • HEMOGLOBIN A1C  10/12/2021   • LIPID PANEL  12/18/2021   • URINE MICROALBUMIN  04/12/2022   • PROSTATE CANCER SCREENING  04/16/2022          The following portions of the patient's history were reviewed and updated as appropriate: past family history and past medical history.    Outpatient Medications Prior to Visit   Medication Sig Dispense Refill   • aspirin 81 MG EC tablet Take 81 mg by mouth Daily.     • bumetanide (BUMEX) 1 MG tablet Take 1 tablet by mouth 2 (Two) Times a Day.     • carvedilol (COREG) 12.5 MG tablet Take one tablet twice daily with meals 180 tablet 2   • Cholecalciferol (Vitamin D) 125 MCG (5000 UT) capsule capsule Take 5,000 Units by mouth Daily.     • Insulin Glargine, 2 Unit Dial, (TOUJEO) 300 UNIT/ML solution pen-injector injection Inject 28 Units under the skin into the appropriate area as directed Daily.     • insulin lispro (humaLOG) 100 UNIT/ML injection Inject 0-14 Units under the skin into the appropriate area as directed 3 (Three) Times a Day Before Meals.  12   • melatonin 3 MG tablet Take 9 mg by mouth Every Night.     • Oxymetazoline HCl (AFRIN NASAL SPRAY NA) into the  "nostril(s) as directed by provider.     • QUEtiapine (SEROquel) 25 MG tablet Take 1 tablet by mouth Every Night.     • sennosides-docusate (senna-docusate sodium) 8.6-50 MG per tablet Take 2 tablets by mouth 2 (Two) Times a Day As Needed for Constipation. 56 tablet 0   • spironolactone (ALDACTONE) 25 MG tablet Take 1 tablet by mouth Daily.     • tamsulosin (FLOMAX) 0.4 MG capsule 24 hr capsule TAKE 2 CAPSULES BY MOUTH AT BEDTIME  (Patient taking differently: Take 1 capsule by mouth 2 (Two) Times a Day.) 60 capsule 0     No facility-administered medications prior to visit.       Patient Active Problem List   Diagnosis   • Anxiety   • Colon polyp   • Type 2 diabetes mellitus with hyperglycemia, with long-term current use of insulin (CMS/[facility])   • Erectile dysfunction   • Hyperlipidemia   • CAD (coronary artery disease)   • Hx of CABG   • Acute on chronic diastolic CHF (congestive heart failure) (CMS/[facility])   • Hypersomnia due to medical condition   • CSA (central sleep apnea)   • Periodic breathing   • HTN (hypertension)   • History of stroke   • CKD (chronic kidney disease) stage 3, GFR 30-59 ml/min (CMS/[facility])   • Urinary retention   • Acute on chronic renal failure (CMS/HCC)   • Acute on chronic diastolic (congestive) heart failure (CMS/[facility])   • Bacteriuria   • Rectal pain       Advanced Care Planning:  ACP discussion was held with the patient during this visit. Patient has an advance directive (not in EMR), copy requested.    Review of Systems    Compared to one year ago, the patient feels his physical health is the same.  Compared to one year ago, the patient feels his mental health is the same.    Reviewed chart for potential of high risk medication in the elderly: yes  Reviewed chart for potential of harmful drug interactions in the elderly:yes    Objective         Vitals:    06/16/21 1233   Temp: 98 °F (36.7 °C)   TempSrc: Infrared   Weight: 82.1 kg (181 lb)   Height: 175.3 cm (69.02\")       Body mass index is " 26.71 kg/m².  Discussed the patient's BMI with him. The BMI is in the acceptable range.    Physical Exam    Lab Results   Component Value Date    HGBA1C 7.60 (H) 04/12/2021        Assessment/Plan   Medicare Risks and Personalized Health Plan  CMS Preventative Services Quick Reference  Advance Directive Discussion  Chronic Pain   Fall Risk  Immunizations Discussed/Encouraged (specific immunizations; Shingrix and COVID19 )    The above risks/problems have been discussed with the patient.  Pertinent information has been shared with the patient in the After Visit Summary.  Follow up plans and orders are seen below in the Assessment/Plan Section.    Diagnoses and all orders for this visit:    1. Hyperlipidemia, unspecified hyperlipidemia type (Primary)  -     CBC & Differential  -     Comprehensive Metabolic Panel  -     Hemoglobin A1c  -     Lipid Panel  -     Urinalysis With Microscopic - Urine, Clean Catch  -     TSH    2. Coronary artery disease involving native coronary artery of native heart without angina pectoris  -     CBC & Differential  -     Comprehensive Metabolic Panel  -     Hemoglobin A1c  -     Lipid Panel  -     Urinalysis With Microscopic - Urine, Clean Catch  -     TSH    3. Hx of CABG  -     CBC & Differential  -     Comprehensive Metabolic Panel  -     Hemoglobin A1c  -     Lipid Panel  -     Urinalysis With Microscopic - Urine, Clean Catch  -     TSH    4. Essential hypertension  -     CBC & Differential  -     Comprehensive Metabolic Panel  -     Hemoglobin A1c  -     Lipid Panel  -     Urinalysis With Microscopic - Urine, Clean Catch  -     TSH    5. Type 2 diabetes mellitus with hyperglycemia, with long-term current use of insulin (CMS/[facility])  -     CBC & Differential  -     Comprehensive Metabolic Panel  -     Hemoglobin A1c  -     Lipid Panel  -     Urinalysis With Microscopic - Urine, Clean Catch  -     TSH    6. Stage 3 chronic kidney disease, unspecified whether stage 3a or 3b CKD  "(CMS/[facility])  -     CBC & Differential  -     Comprehensive Metabolic Panel  -     Hemoglobin A1c  -     Lipid Panel  -     Urinalysis With Microscopic - Urine, Clean Catch  -     TSH    7. Anxiety  -     CBC & Differential  -     Comprehensive Metabolic Panel  -     Hemoglobin A1c  -     Lipid Panel  -     Urinalysis With Microscopic - Urine, Clean Catch  -     TSH    8. History of stroke  -     CBC & Differential  -     Comprehensive Metabolic Panel  -     Hemoglobin A1c  -     Lipid Panel  -     Urinalysis With Microscopic - Urine, Clean Catch  -     TSH      Follow Up:  No follow-ups on file.   I have reviewed the above.    An After Visit Summary and PPPS were given to the patient.        SUBJECTIVE:  The patient is a 71-year-old male with multiple medical problems.  He will receive a Medicare wellness exam today.  His main complaints is insomnia from PTSD.  He also has in his left hip.  This is been ongoing for about 6 months.  Tramadol has helped.    PAST MEDICAL HISTORY:  Reviewed.    REVIEW OF SYSTEMS:  Please see above.  All others reviewed and are negative.      OBJECTIVE:   Temp 98 °F (36.7 °C) (Infrared)   Ht 175.3 cm (69.02\")   Wt 82.1 kg (181 lb)   BMI 26.71 kg/m²    Vitals signs are reviewed and are stable.    General:  Well-nourished.  Alert and oriented x3 in no acute distress.  HEENT: PERRLA.   Neck:  Supple.   Lungs:  Clear.    Heart:  Regular rate and rhythm.   Abdomen:   Soft, nontender.   Extremities:  No cyanosis, clubbing or edema.   Neurological:  Grossly intact without motor or sensory deficits.     ASSESSMENT:      Diagnoses and all orders for this visit:    1. Hyperlipidemia, unspecified hyperlipidemia type (Primary)  -     CBC & Differential  -     Comprehensive Metabolic Panel  -     Hemoglobin A1c  -     Lipid Panel  -     Urinalysis With Microscopic - Urine, Clean Catch  -     TSH    2. Coronary artery disease involving native coronary artery of native heart without angina pectoris  - "     CBC & Differential  -     Comprehensive Metabolic Panel  -     Hemoglobin A1c  -     Lipid Panel  -     Urinalysis With Microscopic - Urine, Clean Catch  -     TSH    3. Hx of CABG  -     CBC & Differential  -     Comprehensive Metabolic Panel  -     Hemoglobin A1c  -     Lipid Panel  -     Urinalysis With Microscopic - Urine, Clean Catch  -     TSH    4. Essential hypertension  -     CBC & Differential  -     Comprehensive Metabolic Panel  -     Hemoglobin A1c  -     Lipid Panel  -     Urinalysis With Microscopic - Urine, Clean Catch  -     TSH    5. Type 2 diabetes mellitus with hyperglycemia, with long-term current use of insulin (CMS/HCC)  -     CBC & Differential  -     Comprehensive Metabolic Panel  -     Hemoglobin A1c  -     Lipid Panel  -     Urinalysis With Microscopic - Urine, Clean Catch  -     TSH    6. Stage 3 chronic kidney disease, unspecified whether stage 3a or 3b CKD (CMS/[facility])  -     CBC & Differential  -     Comprehensive Metabolic Panel  -     Hemoglobin A1c  -     Lipid Panel  -     Urinalysis With Microscopic - Urine, Clean Catch  -     TSH    7. Anxiety  -     CBC & Differential  -     Comprehensive Metabolic Panel  -     Hemoglobin A1c  -     Lipid Panel  -     Urinalysis With Microscopic - Urine, Clean Catch  -     TSH    8. History of stroke  -     CBC & Differential  -     Comprehensive Metabolic Panel  -     Hemoglobin A1c  -     Lipid Panel  -     Urinalysis With Microscopic - Urine, Clean Catch  -     TSH         PLAN: See above orders.  He wants to wait about seeing a psychiatrist for his insomnia/PTSD.  I encouraged him to get vaccinated for Covid.  Discussed healthy lifestyle.  Follow-up on labs.  Call if problems.    Dictated utilizing Dragon dictation.

## 2021-06-19 LAB
BACTERIA SPEC AEROBE CULT: ABNORMAL
BACTERIA SPEC AEROBE CULT: ABNORMAL

## 2021-06-23 ENCOUNTER — PATIENT OUTREACH (OUTPATIENT)
Dept: CASE MANAGEMENT | Facility: OTHER | Age: 71
End: 2021-06-23

## 2021-06-23 NOTE — OUTREACH NOTE
Ambulatory Case Management Note    Patient Outreach     Number listed is answered by patient wife, she is unable to talk due to leaving for lunch with patient. Wife asked for call back next week.     There are no recently modified care plans to display for this patient.      Claudia Blackwood RN  Ambulatory Case Management    6/23/2021, 12:20 EDT

## 2021-07-01 ENCOUNTER — OFFICE VISIT (OUTPATIENT)
Dept: CARDIOLOGY | Facility: CLINIC | Age: 71
End: 2021-07-01

## 2021-07-01 VITALS
OXYGEN SATURATION: 95 % | WEIGHT: 180 LBS | DIASTOLIC BLOOD PRESSURE: 82 MMHG | SYSTOLIC BLOOD PRESSURE: 126 MMHG | HEIGHT: 69 IN | BODY MASS INDEX: 26.66 KG/M2 | HEART RATE: 68 BPM | RESPIRATION RATE: 16 BRPM

## 2021-07-01 DIAGNOSIS — R09.89 DIMINISHED PULSES IN LOWER EXTREMITY: Primary | ICD-10-CM

## 2021-07-01 PROCEDURE — 99214 OFFICE O/P EST MOD 30 MIN: CPT | Performed by: INTERNAL MEDICINE

## 2021-07-01 NOTE — PROGRESS NOTES
[facility]      Patient Name: Dilip Verma  :1950  Age: 71 y.o.  Encounter Provider:  Amador Reyes Jr, MD      Chief Complaint:   Chief Complaint   Patient presents with   • 3 mth F/U         HPI  Dilip Verma is a 71 y.o. male with history of hypertension, hyperlipidemia, diabetes mellitus, coronary artery disease, congestive heart failure, thalamic hemorrhage, pulmonary hypertension, and obstructive sleep apnea.  He is here to establish care with me today.     He was admitted to [facility] in 2019 for syncope.  He was orthostatic with supine hypertension.  Clonidine was discontinued and hydralazine was increased.  He then presented to [facility] on 2019 with uncontrolled blood pressure and blurry vision.  He had an abnormal BNP and troponin.  Echocardiogram showed an ejection fraction of 53%, moderate left ventricular hypertrophy, mild to moderate left atrial enlargement and no significant valvular disease.  He had a Holter monitor which showed paroxysmal atrial tachycardia.  Due to his elevated troponin he underwent cardiac catheterization which showed normal ejection fraction with multivessel coronary artery disease. He underwent coronary artery bypass grafting with LIMA to LAD, vein graft to obtuse marginal 1, vein graft to distal right coronary artery. He was noted to have postoperative atrial fibrillation and was discharged on amiodarone.     He was readmitted in early 2019 with chest pain and non-STEMI.  Perfusion stress test suggested inferior wall and lateral wall ischemia. Heart catheterization completed 2019 showed widely patent grafts.      On 2020 he fell and was diagnosed with multiple femoral fractures.    He has undergone 2 separate surgical interventions for his leg which have left him immobilized over the last few months.  He just got home from skilled nursing facility in has been able to bear weight for a long  time.  This has been disruptive to monitoring fluid status at home and she noted that his waist increased in size significantly.  He is feeling more short of breath.  He has increased lower extremity edema.  No angina.  No dizziness or syncope.  Has been sleeping in a recliner.  He is not using his BiPAP as he feels that he needs oxygen at night and visit has been using nasal cannula.  He is currently on Lasix 80 mg in the morning and 40 mg at night.     Patient saw Danii Guerrier in April about 1 month after last visit and volume status was not improved.  He was admitted to the hospital and diuresed well.  Volume status stable on current outpatient regimen.  Patient is increasing activity as tolerated.  He still very weak from the multiple hospitalizations with a lot of pain in the left leg.  He denies orthopnea or PND.  Lower extremity edema is much improved.    The following portions of the patient's history were reviewed and updated as appropriate: allergies, current medications, past family history, past medical history, past social history, past surgical history and problem list.      Review of Systems   Constitutional: Negative for chills and fever.   HENT: Negative for hoarse voice and sore throat.    Eyes: Negative for double vision and photophobia.   Cardiovascular: Positive for leg swelling and paroxysmal nocturnal dyspnea. Negative for chest pain, dyspnea on exertion, near-syncope, orthopnea, palpitations and syncope.   Respiratory: Positive for shortness of breath. Negative for cough and wheezing.    Skin: Negative for poor wound healing and rash.   Musculoskeletal: Negative for arthritis and joint swelling.   Gastrointestinal: Negative for bloating, abdominal pain, hematemesis and hematochezia.   Neurological: Negative for dizziness and focal weakness.   Psychiatric/Behavioral: Negative for depression and suicidal ideas.       OBJECTIVE:   Vital Signs  Vitals:    07/01/21 1127   BP: 126/82   Pulse: 68  "  Resp: 16   SpO2: 95%     Estimated body mass index is 26.58 kg/m² as calculated from the following:    Height as of this encounter: 175.3 cm (69\").    Weight as of this encounter: 81.6 kg (180 lb).    Vitals reviewed.   Constitutional:       Appearance: Healthy appearance. Not in distress.   Neck:      Vascular: JVR present. JVD normal.   Pulmonary:      Effort: Pulmonary effort is normal.      Breath sounds: No wheezing. No rhonchi. No rales.   Chest:      Chest wall: Not tender to palpatation.   Cardiovascular:      PMI at left midclavicular line. Normal rate. Regular rhythm. Normal S1. Normal S2.      Murmurs: There is no murmur.      No gallop. No click. No rub.   Pulses:     Intact distal pulses.   Edema:     Peripheral edema present.     Thigh: bilateral trace edema of the thigh.     Pretibial: bilateral trace edema of the pretibial area.     Ankle: bilateral trace edema of the ankle.     Feet: bilateral trace edema of the feet.  Abdominal:      General: Bowel sounds are normal.      Palpations: Abdomen is soft.      Tenderness: There is no abdominal tenderness.   Musculoskeletal: Normal range of motion.         General: No tenderness. Skin:     General: Skin is cool and dry.   Neurological:      General: No focal deficit present.      Mental Status: Alert and oriented to person, place and time.         Procedures          ASSESSMENT:     71-year-old male with multiple medical comorbidities presents for worsening heart failure.    PLAN OF CARE:     1. Coronary artery disease status post coronary artery bypass grafting times 3 July 2019 with LIMA to LAD, vein graft to obtuse marginal 1, vein graft to distal right coronary artery with normal left ventricular systolic function.  Then non-STEMI and  inferolateral wall ischemia on perfusion stress test.  Cardiac catheterization 11/2019 showed patent grafts normal left ventricular systolic function.  No angina.  Continue aspirin and beta-blocker.  LDL is " reasonably well controlled off of statin however I think he would be best served by being on low-dose atorvastatin.    2.  Hypertension .    Well-controlled on current regimen.  Sodium restricted diet.  Twice daily blood pressure log.  3.  Hyperlipidemia- unclear why he is not on statin therapy  4.  Diabetes mellitus followed by PCP  5.  Obstructive sleep apnea still having difficulty tolerating BiPAP.  Follows with Dr. Cai   6.  Pulmonary hypertension  7.  Postoperative atrial fibrillation- no known recurrence  8.  Bilateral carotid artery stenosis mild on duplex July 2019  9.  Grade 3 diastolic dysfunction on echocardiogram July 2019  10. Remote history of thalamic hemorrhage noted on MRI July 2019  11. History of hypogonadism with prior testosterone replacement treatment.    12.  Chronic diastolic heart failure -blood pressure well controlled today.  Volume status much improved.  Continue same.  13. Chronic kidney disease follows with nephrology   14.  Diminished lower extremity pulses -check AMINTA    Return to clinic 3 months           Discharge Medications          Accurate as of July 1, 2021 11:41 AM. If you have any questions, ask your nurse or doctor.            Continue These Medications      Instructions Start Date   AFRIN NASAL SPRAY NA   Nasal      aspirin 81 MG EC tablet   81 mg, Oral, Daily      bumetanide 1 MG tablet  Commonly known as: BUMEX   1 mg, Oral, 2 Times Daily      carvedilol 12.5 MG tablet  Commonly known as: COREG   Take one tablet twice daily with meals      Insulin Glargine (2 Unit Dial) 300 UNIT/ML solution pen-injector injection  Commonly known as: TOUJEO   30 Units, Subcutaneous, Daily      insulin lispro 100 UNIT/ML injection  Commonly known as: humaLOG   0-14 Units, Subcutaneous, 3 Times Daily Before Meals      melatonin 3 MG tablet   9 mg, Oral, Nightly      potassium chloride 10 MEQ CR tablet   10 mEq, Oral, Daily      sennosides-docusate 8.6-50 MG per tablet  Commonly known  as: PERICOLACE   2 tablets, Oral, 2 Times Daily PRN      spironolactone 25 MG tablet  Commonly known as: ALDACTONE   25 mg, Oral, Daily      tamsulosin 0.4 MG capsule 24 hr capsule  Commonly known as: FLOMAX   0.4 mg, Oral, 2 Times Daily      traMADol 50 MG tablet  Commonly known as: ULTRAM   50 mg, Oral, Every 8 Hours PRN      vitamin D3 125 MCG (5000 UT) capsule capsule   5,000 Units, Oral, Daily             Thank you for allowing me to participate in the care of your patient,      Sincerely,   Amador Reyes MD  [facility]  07/01/21  11:41 EDT

## 2021-07-07 ENCOUNTER — PATIENT OUTREACH (OUTPATIENT)
Dept: CASE MANAGEMENT | Facility: OTHER | Age: 71
End: 2021-07-07

## 2021-07-07 NOTE — OUTREACH NOTE
"Ambulatory Case Management Note    Patient Outreach    Number listed is answered by patient's wife, she reports patient is doing \"ok\" he is under the care of several MD's, is very compliant with in person visits or does video visits with all providers. Patient attends out patient PT sessions twice weekly at [facility]. Patient was fitted for a new leg brace to help with leg length discrepancy, this will be picked up on 7/9/21 and adjusted by PT staff.  Hopkins Cather was changed 7/6/21 by urology, plans in the future for removal and self cathing if needed. Blood sugars are not \"where we want them\" but endocrinology is changing insulin as needed.(Marie testing is used)  Current weight is 177, patient is weighed 2-3 times a week. Wife states patient sometimes can not stand on the scale daily, suggested wife measure abdominal girth if patient can not stand on scale. Health Maintenance gaps reviewed, ACP has not been completed & declined any information. Wife states they are not interested in any immunizations \"he got deathly sick when he got the pneumonia shot\" Nurse provided patient education.No other questions, concerns or needs regarding health and wellness voiced at this time. Nurse provided patient education.No other questions, concerns or needs regarding health and wellness voiced at this time.         Claudia Blackwood RN  Ambulatory Case Management    7/7/2021, 10:46 EDT    "

## 2021-07-26 DIAGNOSIS — G47.00 INSOMNIA, UNSPECIFIED TYPE: ICD-10-CM

## 2021-07-26 DIAGNOSIS — Z95.1 HX OF CABG: ICD-10-CM

## 2021-07-26 DIAGNOSIS — Z00.00 MEDICARE ANNUAL WELLNESS VISIT, SUBSEQUENT: ICD-10-CM

## 2021-07-26 DIAGNOSIS — N18.30 STAGE 3 CHRONIC KIDNEY DISEASE, UNSPECIFIED WHETHER STAGE 3A OR 3B CKD (HCC): ICD-10-CM

## 2021-07-26 DIAGNOSIS — Z79.4 TYPE 2 DIABETES MELLITUS WITH HYPERGLYCEMIA, WITH LONG-TERM CURRENT USE OF INSULIN (HCC): ICD-10-CM

## 2021-07-26 DIAGNOSIS — F43.10 PTSD (POST-TRAUMATIC STRESS DISORDER): ICD-10-CM

## 2021-07-26 DIAGNOSIS — I25.10 CORONARY ARTERY DISEASE INVOLVING NATIVE CORONARY ARTERY OF NATIVE HEART WITHOUT ANGINA PECTORIS: ICD-10-CM

## 2021-07-26 DIAGNOSIS — E78.5 HYPERLIPIDEMIA, UNSPECIFIED HYPERLIPIDEMIA TYPE: ICD-10-CM

## 2021-07-26 DIAGNOSIS — I10 ESSENTIAL HYPERTENSION: ICD-10-CM

## 2021-07-26 DIAGNOSIS — E11.65 TYPE 2 DIABETES MELLITUS WITH HYPERGLYCEMIA, WITH LONG-TERM CURRENT USE OF INSULIN (HCC): ICD-10-CM

## 2021-07-26 DIAGNOSIS — F41.9 ANXIETY: ICD-10-CM

## 2021-07-26 DIAGNOSIS — Z86.73 HISTORY OF STROKE: ICD-10-CM

## 2021-07-26 DIAGNOSIS — M25.552 LEFT HIP PAIN: ICD-10-CM

## 2021-07-26 RX ORDER — TRAMADOL HYDROCHLORIDE 50 MG/1
TABLET ORAL
Qty: 40 TABLET | Refills: 0 | Status: SHIPPED | OUTPATIENT
Start: 2021-07-26 | End: 2021-09-01

## 2021-07-28 ENCOUNTER — TELEPHONE (OUTPATIENT)
Dept: FAMILY MEDICINE CLINIC | Facility: CLINIC | Age: 71
End: 2021-07-28

## 2021-07-28 NOTE — TELEPHONE ENCOUNTER
Pt has tried Melatonin and that does not work.  He will try Tylenol PM and if this does not work he will let us know.[email or web address]

## 2021-07-28 NOTE — TELEPHONE ENCOUNTER
Caller: Beny Verma    Relationship: Emergency Contact    Best call back number: [phone] (H)    What medication are you requesting:     What are your current symptoms: PATIENT NOT SLEEPING    How long have you been experiencing symptoms:     Have you had these symptoms before:    [x] Yes  [] No    Have you been treated for these symptoms before:   [x] Yes  [] No    If a prescription is needed, what is your preferred pharmacy and phone number:   [id number] - [facility] [address] [place] [address] PKY - [phone]  - [phone]   [phone]      Additional notes: PATIENT WIFE ALSO WANTS DR TRAN TO BE AWARE THAT DR DEANA VIVAR HAS TAKEN PATIENT OFF OF potassium chloride 10 MEQ CR tablet  AND tamsulosin (FLOMAX) 0.4 MG capsule 24 hr capsule

## 2021-08-06 ENCOUNTER — HOSPITAL ENCOUNTER (OUTPATIENT)
Dept: CARDIOLOGY | Facility: HOSPITAL | Age: 71
Discharge: HOME | End: 2021-08-06
Admitting: INTERNAL MEDICINE

## 2021-08-06 ENCOUNTER — TELEPHONE (OUTPATIENT)
Dept: CARDIOLOGY | Facility: CLINIC | Age: 71
End: 2021-08-06

## 2021-08-06 DIAGNOSIS — R09.89 DIMINISHED PULSES IN LOWER EXTREMITY: ICD-10-CM

## 2021-08-06 DIAGNOSIS — R68.89 ABNORMAL ANKLE BRACHIAL INDEX (ABI): Primary | ICD-10-CM

## 2021-08-06 LAB
BH CV LOWER ARTERIAL LEFT ABI RATIO: 0.78
BH CV LOWER ARTERIAL LEFT CALF RATIO: 0.9
BH CV LOWER ARTERIAL LEFT HIGH THIGH SYS MAX: 189 MMHG
BH CV LOWER ARTERIAL LEFT LOW THIGH SYS MAX: 155 MMHG
BH CV LOWER ARTERIAL LEFT POPLITEAL SYS MAX: 130 MMHG
BH CV LOWER ARTERIAL LEFT POST TIBIAL SYS MAX: 113 MMHG
BH CV LOWER ARTERIAL RIGHT ABI RATIO: 0.89
BH CV LOWER ARTERIAL RIGHT CALF RATIO: 1.02
BH CV LOWER ARTERIAL RIGHT HIGH THIGH SYS MAX: 240 MMHG
BH CV LOWER ARTERIAL RIGHT LOW THIGH SYS MAX: 193 MMHG
BH CV LOWER ARTERIAL RIGHT POPLITEAL SYS MAX: 147 MMHG
BH CV LOWER ARTERIAL RIGHT POST TIBIAL SYS MAX: 128 MMHG
MAXIMAL PREDICTED HEART RATE: 148 BPM
STRESS TARGET HR: 126 BPM
UPPER ARTERIAL LEFT ARM BRACHIAL SYS MAX: 144 MMHG
UPPER ARTERIAL RIGHT ARM BRACHIAL SYS MAX: 144 MMHG

## 2021-08-06 PROCEDURE — 93923 UPR/LXTR ART STDY 3+ LVLS: CPT

## 2021-08-06 PROCEDURE — 93923 UPR/LXTR ART STDY 3+ LVLS: CPT | Performed by: INTERNAL MEDICINE

## 2021-09-01 DIAGNOSIS — F43.10 PTSD (POST-TRAUMATIC STRESS DISORDER): ICD-10-CM

## 2021-09-01 DIAGNOSIS — Z79.4 TYPE 2 DIABETES MELLITUS WITH HYPERGLYCEMIA, WITH LONG-TERM CURRENT USE OF INSULIN (HCC): ICD-10-CM

## 2021-09-01 DIAGNOSIS — Z86.73 HISTORY OF STROKE: ICD-10-CM

## 2021-09-01 DIAGNOSIS — Z00.00 MEDICARE ANNUAL WELLNESS VISIT, SUBSEQUENT: ICD-10-CM

## 2021-09-01 DIAGNOSIS — M25.552 LEFT HIP PAIN: ICD-10-CM

## 2021-09-01 DIAGNOSIS — E78.5 HYPERLIPIDEMIA, UNSPECIFIED HYPERLIPIDEMIA TYPE: ICD-10-CM

## 2021-09-01 DIAGNOSIS — N18.30 STAGE 3 CHRONIC KIDNEY DISEASE, UNSPECIFIED WHETHER STAGE 3A OR 3B CKD (HCC): ICD-10-CM

## 2021-09-01 DIAGNOSIS — F41.9 ANXIETY: ICD-10-CM

## 2021-09-01 DIAGNOSIS — G47.00 INSOMNIA, UNSPECIFIED TYPE: ICD-10-CM

## 2021-09-01 DIAGNOSIS — E11.65 TYPE 2 DIABETES MELLITUS WITH HYPERGLYCEMIA, WITH LONG-TERM CURRENT USE OF INSULIN (HCC): ICD-10-CM

## 2021-09-01 DIAGNOSIS — Z95.1 HX OF CABG: ICD-10-CM

## 2021-09-01 DIAGNOSIS — I25.10 CORONARY ARTERY DISEASE INVOLVING NATIVE CORONARY ARTERY OF NATIVE HEART WITHOUT ANGINA PECTORIS: ICD-10-CM

## 2021-09-01 DIAGNOSIS — I10 ESSENTIAL HYPERTENSION: ICD-10-CM

## 2021-09-01 RX ORDER — TRAMADOL HYDROCHLORIDE 50 MG/1
TABLET ORAL
Qty: 40 TABLET | Refills: 0 | Status: SHIPPED | OUTPATIENT
Start: 2021-09-01 | End: 2021-10-13

## 2021-10-05 NOTE — TELEPHONE ENCOUNTER
See if he has tried Tylenol PM and or melatonin.  See if I have given him anything before for insomnia.   Situation: Contacted patient to onboard to the RNCM program.    Key Assessments: Patient states that she is doing fairly well since being home. She said that she had a nurse from the [facility] come over since she's been home and has checked on her. Discussed my role as an RNCM. She said that it would be fine that I call next week.     Actions Taken: Patient enrolled, FYI's placed, targets completed.    Plan: Will call patient next week Thursday to go through general assessment and SDOH.      See hyperlinks within encounter for full documentation

## 2021-10-12 DIAGNOSIS — Z00.00 MEDICARE ANNUAL WELLNESS VISIT, SUBSEQUENT: ICD-10-CM

## 2021-10-12 DIAGNOSIS — G47.00 INSOMNIA, UNSPECIFIED TYPE: ICD-10-CM

## 2021-10-12 DIAGNOSIS — Z79.4 TYPE 2 DIABETES MELLITUS WITH HYPERGLYCEMIA, WITH LONG-TERM CURRENT USE OF INSULIN (HCC): ICD-10-CM

## 2021-10-12 DIAGNOSIS — M25.552 LEFT HIP PAIN: ICD-10-CM

## 2021-10-12 DIAGNOSIS — F41.9 ANXIETY: ICD-10-CM

## 2021-10-12 DIAGNOSIS — F43.10 PTSD (POST-TRAUMATIC STRESS DISORDER): ICD-10-CM

## 2021-10-12 DIAGNOSIS — Z95.1 HX OF CABG: ICD-10-CM

## 2021-10-12 DIAGNOSIS — I25.10 CORONARY ARTERY DISEASE INVOLVING NATIVE CORONARY ARTERY OF NATIVE HEART WITHOUT ANGINA PECTORIS: ICD-10-CM

## 2021-10-12 DIAGNOSIS — E78.5 HYPERLIPIDEMIA, UNSPECIFIED HYPERLIPIDEMIA TYPE: ICD-10-CM

## 2021-10-12 DIAGNOSIS — N18.30 STAGE 3 CHRONIC KIDNEY DISEASE, UNSPECIFIED WHETHER STAGE 3A OR 3B CKD (HCC): ICD-10-CM

## 2021-10-12 DIAGNOSIS — E11.65 TYPE 2 DIABETES MELLITUS WITH HYPERGLYCEMIA, WITH LONG-TERM CURRENT USE OF INSULIN (HCC): ICD-10-CM

## 2021-10-12 DIAGNOSIS — I10 ESSENTIAL HYPERTENSION: ICD-10-CM

## 2021-10-12 DIAGNOSIS — Z86.73 HISTORY OF STROKE: ICD-10-CM

## 2021-10-13 RX ORDER — TRAMADOL HYDROCHLORIDE 50 MG/1
TABLET ORAL
Qty: 40 TABLET | Refills: 0 | Status: SHIPPED | OUTPATIENT
Start: 2021-10-13 | End: 2021-11-19

## 2021-10-21 ENCOUNTER — OFFICE VISIT (OUTPATIENT)
Dept: CARDIOLOGY | Facility: CLINIC | Age: 71
End: 2021-10-21

## 2021-10-21 ENCOUNTER — TELEPHONE (OUTPATIENT)
Dept: CARDIOLOGY | Facility: CLINIC | Age: 71
End: 2021-10-21

## 2021-10-21 ENCOUNTER — HOSPITAL ENCOUNTER (OUTPATIENT)
Dept: CARDIOLOGY | Facility: HOSPITAL | Age: 71
Discharge: HOME | End: 2021-10-21
Admitting: INTERNAL MEDICINE

## 2021-10-21 VITALS
BODY MASS INDEX: 27.78 KG/M2 | HEIGHT: 69 IN | HEART RATE: 80 BPM | WEIGHT: 187.6 LBS | DIASTOLIC BLOOD PRESSURE: 80 MMHG | SYSTOLIC BLOOD PRESSURE: 144 MMHG

## 2021-10-21 DIAGNOSIS — I50.32 CHRONIC DIASTOLIC CHF (CONGESTIVE HEART FAILURE) (HCC): Primary | ICD-10-CM

## 2021-10-21 DIAGNOSIS — I50.32 CHRONIC DIASTOLIC CHF (CONGESTIVE HEART FAILURE) (HCC): ICD-10-CM

## 2021-10-21 LAB
ALBUMIN SERPL-MCNC: 4 G/DL (ref 3.5–5.2)
ALBUMIN/GLOB SERPL: 1.2 G/DL
ALP SERPL-CCNC: 89 U/L (ref 39–117)
ALT SERPL W P-5'-P-CCNC: 23 U/L (ref 1–41)
ANION GAP SERPL CALCULATED.3IONS-SCNC: 10.8 MMOL/L (ref 5–15)
AST SERPL-CCNC: 20 U/L (ref 1–40)
BILIRUB SERPL-MCNC: 0.4 MG/DL (ref 0–1.2)
BUN SERPL-MCNC: 42 MG/DL (ref 8–23)
BUN/CREAT SERPL: 28.6 (ref 7–25)
CALCIUM SPEC-SCNC: 9 MG/DL (ref 8.6–10.5)
CHLORIDE SERPL-SCNC: 96 MMOL/L (ref 98–107)
CO2 SERPL-SCNC: 30.2 MMOL/L (ref 22–29)
CREAT SERPL-MCNC: 1.47 MG/DL (ref 0.76–1.27)
GFR SERPL CREATININE-BSD FRML MDRD: 47 ML/MIN/1.73
GLOBULIN UR ELPH-MCNC: 3.4 GM/DL
GLUCOSE SERPL-MCNC: 412 MG/DL (ref 65–99)
NT-PROBNP SERPL-MCNC: 619.8 PG/ML (ref 0–900)
POTASSIUM SERPL-SCNC: 4.8 MMOL/L (ref 3.5–5.2)
PROT SERPL-MCNC: 7.4 G/DL (ref 6–8.5)
SODIUM SERPL-SCNC: 137 MMOL/L (ref 136–145)

## 2021-10-21 PROCEDURE — 83880 ASSAY OF NATRIURETIC PEPTIDE: CPT | Performed by: INTERNAL MEDICINE

## 2021-10-21 PROCEDURE — 80053 COMPREHEN METABOLIC PANEL: CPT | Performed by: INTERNAL MEDICINE

## 2021-10-21 PROCEDURE — 36415 COLL VENOUS BLD VENIPUNCTURE: CPT

## 2021-10-21 PROCEDURE — 99214 OFFICE O/P EST MOD 30 MIN: CPT | Performed by: INTERNAL MEDICINE

## 2021-10-21 RX ORDER — TESTOSTERONE 12.5 MG/1.25G
GEL TOPICAL DAILY
COMMUNITY

## 2021-10-21 RX ORDER — CIPROFLOXACIN 500 MG/1
500 TABLET, FILM COATED ORAL 2 TIMES DAILY
COMMUNITY
Start: 2021-10-18 | End: 2022-01-07

## 2021-10-21 RX ORDER — ATORVASTATIN CALCIUM 10 MG/1
10 TABLET, FILM COATED ORAL DAILY
Qty: 30 TABLET | Refills: 11 | Status: SHIPPED | OUTPATIENT
Start: 2021-10-21 | End: 2022-01-07

## 2021-10-21 NOTE — PROGRESS NOTES
[facility]      Patient Name: Dilip Verma  :1950  Age: 71 y.o.  Encounter Provider:  Amador Reyes Jr, MD      Chief Complaint: Follow-up coronary artery disease status post CABG 2019, chronic diastolic heart failure, COPD on nocturnal oxygen, BiPAP noncompliance, diabetes, hypertension and dyslipidemia      HPI  Dilip Verma is a 71 y.o. male with history of hypertension, hyperlipidemia, diabetes mellitus, coronary artery disease, congestive heart failure, thalamic hemorrhage, pulmonary hypertension, and obstructive sleep apnea.  He is here to establish care with me today.     He was admitted to [facility] in 2019 for syncope.  He was orthostatic with supine hypertension.  Clonidine was discontinued and hydralazine was increased.  He then presented to [facility] on 2019 with uncontrolled blood pressure and blurry vision.  He had an abnormal BNP and troponin.  Echocardiogram showed an ejection fraction of 53%, moderate left ventricular hypertrophy, mild to moderate left atrial enlargement and no significant valvular disease.  He had a Holter monitor which showed paroxysmal atrial tachycardia.  Due to his elevated troponin he underwent cardiac catheterization which showed normal ejection fraction with multivessel coronary artery disease. He underwent coronary artery bypass grafting with LIMA to LAD, vein graft to obtuse marginal 1, vein graft to distal right coronary artery. He was noted to have postoperative atrial fibrillation and was discharged on amiodarone.     He was readmitted in early 2019 with chest pain and non-STEMI.  Perfusion stress test suggested inferior wall and lateral wall ischemia. Heart catheterization completed 2019 showed widely patent grafts.      On 2020 he fell and was diagnosed with multiple femoral fractures.    He has undergone 2 separate surgical interventions for his leg which have left him immobilized  over the last few months.  He just got home from skilled nursing facility in has been able to bear weight for a long time.  This has been disruptive to monitoring fluid status at home and she noted that his waist increased in size significantly.  He is feeling more short of breath.  He has increased lower extremity edema.  No angina.  No dizziness or syncope.  Has been sleeping in a recliner.  He is not using his BiPAP as he feels that he needs oxygen at night and visit has been using nasal cannula.  He is currently on Lasix 80 mg in the morning and 40 mg at night.     Patient saw Danii Guerrier in April 2021 and volume status was not improved.  He was admitted to the hospital and diuresed well.  Volume status stable on current outpatient regimen.  Patient is increasing activity as tolerated.  He still very weak from the multiple hospitalizations with a lot of pain in the left leg.  He denies orthopnea or PND.  Lower extremity edema is much improved.    He has done well since then. Tolerating diuretics well. No chest pain with activity around the home. He is having increased leg pain and tells me that there is need for revision of fracture repair. He denies orthopnea or PND. The left lower extremity (the site of fracture repair) is chronically swollen. Right lower extremity without edema. No palpitations, dizziness syncope. Social family history reviewed and are not pertinent to this clinic visit.    The following portions of the patient's history were reviewed and updated as appropriate: allergies, current medications, past family history, past medical history, past social history, past surgical history and problem list.      Review of Systems   Constitutional: Negative for chills and fever.   HENT: Negative for hoarse voice and sore throat.    Eyes: Negative for double vision and photophobia.   Cardiovascular: Positive for leg swelling. Negative for chest pain, dyspnea on exertion, near-syncope, orthopnea,  "palpitations, paroxysmal nocturnal dyspnea and syncope.   Respiratory: Positive for shortness of breath. Negative for cough and wheezing.    Skin: Negative for poor wound healing and rash.   Musculoskeletal: Negative for arthritis and joint swelling.   Gastrointestinal: Negative for bloating, abdominal pain, hematemesis and hematochezia.   Neurological: Negative for dizziness and focal weakness.   Psychiatric/Behavioral: Negative for depression and suicidal ideas.       OBJECTIVE:   Vital Signs  Vitals:    10/21/21 1309   BP: 144/80   Pulse: 80     Estimated body mass index is 27.7 kg/m² as calculated from the following:    Height as of this encounter: 175.3 cm (69\").    Weight as of this encounter: 85.1 kg (187 lb 9.6 oz).    Vitals reviewed.   Constitutional:       Appearance: Healthy appearance. Not in distress.   Neck:      Vascular: JVR present. JVD normal.   Pulmonary:      Effort: Pulmonary effort is normal.      Breath sounds: No wheezing. No rhonchi. No rales.   Chest:      Chest wall: Not tender to palpatation.   Cardiovascular:      PMI at left midclavicular line. Normal rate. Regular rhythm. Normal S1. Normal S2.      Murmurs: There is no murmur.      No gallop. No click. No rub.   Pulses:     Intact distal pulses.   Edema:     Peripheral edema present.     Pretibial: 1+ edema of the left pretibial area.     Ankle: 1+ edema of the left ankle.     Feet: 1+ edema of the left foot.  Abdominal:      General: Bowel sounds are normal.      Palpations: Abdomen is soft.      Tenderness: There is no abdominal tenderness.   Musculoskeletal: Normal range of motion.         General: No tenderness. Skin:     General: Skin is cool and dry.   Neurological:      General: No focal deficit present.      Mental Status: Alert and oriented to person, place and time.         Procedures          ASSESSMENT:     71-year-old male with multiple medical comorbidities presents for worsening heart failure.    PLAN OF CARE: "     1. Coronary artery disease status post coronary artery bypass grafting times 3 July 2019 with LIMA to LAD, vein graft to obtuse marginal 1, vein graft to distal right coronary artery with normal left ventricular systolic function.  Then non-STEMI and  inferolateral wall ischemia on perfusion stress test.  Cardiac catheterization 11/2019 showed patent grafts normal left ventricular systolic function.  No angina.  Continue aspirin and beta-blocker.  LDL is reasonably well controlled off of statin however I think he would be best served by being on low-dose atorvastatin.    2.  Hypertension .  Slightly elevated today in clinic.  Sodium restricted diet.  Twice daily blood pressure log.  3.  Hyperlipidemia- unclear why he is not on statin therapy  4.  Diabetes mellitus followed by PCP  5.  Obstructive sleep apnea still having difficulty tolerating BiPAP.  Follows with Dr. Cai   6.  Pulmonary hypertension  7.  Postoperative atrial fibrillation- no known recurrence  8.  Bilateral carotid artery stenosis mild on duplex July 2019  9.  Grade 3 diastolic dysfunction on echocardiogram July 2019  10. Remote history of thalamic hemorrhage noted on MRI July 2019  11. History of hypogonadism with prior testosterone replacement treatment.    12.  Chronic diastolic heart failure -blood pressure better controlled at home. Euvolemic today in clinic. He is using nocturnal oxygen and we will review pulmonary records. BNP is within normal range today. Echocardiogram in April with normal left ventricular ejection fraction. Continue same.  13. Chronic kidney disease follows with nephrology   14.  Diminished lower extremity pulses - abnormal AMINTA. Following with vascular surgery.    Patient with history of coronary artery disease and NSTEMI, chronic diastolic heart failure and CVA. This puts him at increased risk for perioperative MACE but the risk is nonmodifiable. No indication for further testing at this time. Continue  preoperative beta-blocker. I will see him back in clinic in 3 months.           Discharge Medications          Accurate as of October 21, 2021  1:10 PM. If you have any questions, ask your nurse or doctor.            Continue These Medications      Instructions Start Date   AFRIN NASAL SPRAY NA   Nasal      aspirin 81 MG EC tablet   81 mg, Oral, Daily      bumetanide 1 MG tablet  Commonly known as: BUMEX   1 mg, Oral, 2 Times Daily      carvedilol 12.5 MG tablet  Commonly known as: COREG   Take one tablet twice daily with meals      ciprofloxacin 500 MG tablet  Commonly known as: CIPRO   500 mg, Oral, 2 times daily      Insulin Glargine (2 Unit Dial) 300 UNIT/ML solution pen-injector injection  Commonly known as: TOUJEO   30 Units, Subcutaneous, Daily      insulin lispro 100 UNIT/ML injection  Commonly known as: humaLOG   0-14 Units, Subcutaneous, 3 Times Daily Before Meals      melatonin 3 MG tablet   9 mg, Oral, Nightly      potassium chloride 10 MEQ CR tablet   10 mEq, Oral, Daily      sennosides-docusate 8.6-50 MG per tablet  Commonly known as: PERICOLACE   2 tablets, Oral, 2 Times Daily PRN      spironolactone 25 MG tablet  Commonly known as: ALDACTONE   25 mg, Oral, Daily      tamsulosin 0.4 MG capsule 24 hr capsule  Commonly known as: FLOMAX   0.4 mg, Oral, 2 Times Daily      testosterone 25 MG/2.5GM (1%) gel gel  Commonly known as: ANDROGEL   Transdermal, Daily      traMADol 50 MG tablet  Commonly known as: ULTRAM   TAKE 1 TABLET BY MOUTH EVERY EIGHT HOURS AS NEEDED FOR SEVERE PAIN      vitamin D3 125 MCG (5000 UT) capsule capsule   5,000 Units, Oral, Daily             Thank you for allowing me to participate in the care of your patient,      Sincerely,   Amador Reyes MD  [facility]  10/21/21  13:10 EDT

## 2021-10-21 NOTE — TELEPHONE ENCOUNTER
Spoke to patient about lab values especially very high glucose. They did a fingerstick when he got home and adjusted insulin. They will keep a close eye on it.

## 2021-11-04 ENCOUNTER — TELEPHONE (OUTPATIENT)
Dept: FAMILY MEDICINE CLINIC | Facility: CLINIC | Age: 71
End: 2021-11-04

## 2021-11-11 RX ORDER — SILDENAFIL CITRATE 20 MG/1
TABLET ORAL
Qty: 20 TABLET | Refills: 2 | Status: SHIPPED | OUTPATIENT
Start: 2021-11-11 | End: 2023-01-03

## 2021-11-18 DIAGNOSIS — G47.00 INSOMNIA, UNSPECIFIED TYPE: ICD-10-CM

## 2021-11-18 DIAGNOSIS — Z86.73 HISTORY OF STROKE: ICD-10-CM

## 2021-11-18 DIAGNOSIS — M25.552 LEFT HIP PAIN: ICD-10-CM

## 2021-11-18 DIAGNOSIS — I25.10 CORONARY ARTERY DISEASE INVOLVING NATIVE CORONARY ARTERY OF NATIVE HEART WITHOUT ANGINA PECTORIS: ICD-10-CM

## 2021-11-18 DIAGNOSIS — N18.30 STAGE 3 CHRONIC KIDNEY DISEASE, UNSPECIFIED WHETHER STAGE 3A OR 3B CKD (HCC): ICD-10-CM

## 2021-11-18 DIAGNOSIS — I10 ESSENTIAL HYPERTENSION: ICD-10-CM

## 2021-11-18 DIAGNOSIS — Z00.00 MEDICARE ANNUAL WELLNESS VISIT, SUBSEQUENT: ICD-10-CM

## 2021-11-18 DIAGNOSIS — Z79.4 TYPE 2 DIABETES MELLITUS WITH HYPERGLYCEMIA, WITH LONG-TERM CURRENT USE OF INSULIN (HCC): ICD-10-CM

## 2021-11-18 DIAGNOSIS — E78.5 HYPERLIPIDEMIA, UNSPECIFIED HYPERLIPIDEMIA TYPE: ICD-10-CM

## 2021-11-18 DIAGNOSIS — E11.65 TYPE 2 DIABETES MELLITUS WITH HYPERGLYCEMIA, WITH LONG-TERM CURRENT USE OF INSULIN (HCC): ICD-10-CM

## 2021-11-18 DIAGNOSIS — Z95.1 HX OF CABG: ICD-10-CM

## 2021-11-18 DIAGNOSIS — F43.10 PTSD (POST-TRAUMATIC STRESS DISORDER): ICD-10-CM

## 2021-11-18 DIAGNOSIS — F41.9 ANXIETY: ICD-10-CM

## 2021-11-19 RX ORDER — TRAMADOL HYDROCHLORIDE 50 MG/1
TABLET ORAL
Qty: 40 TABLET | Refills: 0 | Status: SHIPPED | OUTPATIENT
Start: 2021-11-19 | End: 2021-12-28 | Stop reason: SDUPTHER

## 2021-11-24 ENCOUNTER — OFFICE VISIT (OUTPATIENT)
Dept: FAMILY MEDICINE CLINIC | Facility: CLINIC | Age: 71
End: 2021-11-24

## 2021-11-24 VITALS
DIASTOLIC BLOOD PRESSURE: 70 MMHG | HEART RATE: 81 BPM | SYSTOLIC BLOOD PRESSURE: 122 MMHG | HEIGHT: 69 IN | BODY MASS INDEX: 27.7 KG/M2 | RESPIRATION RATE: 20 BRPM | TEMPERATURE: 97.5 F | OXYGEN SATURATION: 95 %

## 2021-11-24 DIAGNOSIS — R53.83 FATIGUE, UNSPECIFIED TYPE: ICD-10-CM

## 2021-11-24 DIAGNOSIS — Z79.4 TYPE 2 DIABETES MELLITUS WITH HYPERGLYCEMIA, WITH LONG-TERM CURRENT USE OF INSULIN (HCC): ICD-10-CM

## 2021-11-24 DIAGNOSIS — E78.5 HYPERLIPIDEMIA, UNSPECIFIED HYPERLIPIDEMIA TYPE: ICD-10-CM

## 2021-11-24 DIAGNOSIS — M25.552 LEFT HIP PAIN: ICD-10-CM

## 2021-11-24 DIAGNOSIS — I25.10 CORONARY ARTERY DISEASE INVOLVING NATIVE CORONARY ARTERY OF NATIVE HEART WITHOUT ANGINA PECTORIS: Primary | ICD-10-CM

## 2021-11-24 DIAGNOSIS — E11.65 TYPE 2 DIABETES MELLITUS WITH HYPERGLYCEMIA, WITH LONG-TERM CURRENT USE OF INSULIN (HCC): ICD-10-CM

## 2021-11-24 DIAGNOSIS — Z95.1 HX OF CABG: ICD-10-CM

## 2021-11-24 DIAGNOSIS — I10 PRIMARY HYPERTENSION: ICD-10-CM

## 2021-11-24 PROCEDURE — 99214 OFFICE O/P EST MOD 30 MIN: CPT | Performed by: FAMILY MEDICINE

## 2021-11-24 NOTE — PROGRESS NOTES
"SUBJECTIVE:  The patient is a 71-year-old male.  Has multiple medical problems.  Today complains of hip pain.  He is scheduled to have eyelid surgery in the near future.  He complains of fatigue. He had hip surgery roughly a year ago. His case is very complicated involve fractures in different parts of the lower extremity.    PAST MEDICAL HISTORY:  Reviewed.    REVIEW OF SYSTEMS:  Please see above.  All others reviewed and are negative.      OBJECTIVE:   /70 (BP Location: Left arm, Patient Position: Sitting)   Pulse 81   Temp 97.5 °F (36.4 °C) (Infrared)   Resp 20   Ht 175.3 cm (69\")   SpO2 95%   BMI 27.70 kg/m²    Vitals signs are reviewed and are stable.    General:  Well-nourished.  Alert and oriented x3 in no acute distress.  HEENT: PERRLA.   Neck:  Supple.   Lungs:  Clear.    Heart:  Regular rate and rhythm.   Abdomen:   Soft, nontender.   Extremities: Edema is noted on the lower extremities. There appears to be thickening of the nails on the left foot with a scab present as well. He ambulates with a walker.  Neurological:  Grossly intact without motor or sensory deficits.     ASSESSMENT:      Diagnoses and all orders for this visit:    1. Coronary artery disease involving native coronary artery of native heart without angina pectoris (Primary)  -     CBC & Differential  -     Comprehensive Metabolic Panel  -     Lipid Panel  -     Hemoglobin A1c    2. Hyperlipidemia, unspecified hyperlipidemia type  -     CBC & Differential  -     Comprehensive Metabolic Panel  -     Lipid Panel  -     Hemoglobin A1c    3. Type 2 diabetes mellitus with hyperglycemia, with long-term current use of insulin (HCC)  -     CBC & Differential  -     Comprehensive Metabolic Panel  -     Lipid Panel  -     Hemoglobin A1c    4. Hx of CABG  -     CBC & Differential  -     Comprehensive Metabolic Panel  -     Lipid Panel  -     Hemoglobin A1c    5. Primary hypertension  -     CBC & Differential  -     Comprehensive Metabolic " Panel  -     Lipid Panel  -     Hemoglobin A1c    6. Fatigue, unspecified type  -     Hemoglobin A1c    7. Left hip pain         PLAN: The patient is requesting a second opinion regarding his hip revision. I told him he had a very extensive complicated fracture. He has seen his orthopedic surgeon with the procedure explained to him. He understands that this revision procedure is extremely complicated as well. However since he is requesting a second opinion I will provide him with 1. We discussed healthy lifestyle.. He has had recent labs in is not particularly interested in getting labs today. We will try to retrieve his labs from other sources. He does see an endocrinologist.    Dictated utilizing Dragon dictation.

## 2021-11-25 DIAGNOSIS — M25.552 LEFT HIP PAIN: Primary | ICD-10-CM

## 2021-12-13 ENCOUNTER — TELEPHONE (OUTPATIENT)
Dept: CARDIOLOGY | Facility: CLINIC | Age: 71
End: 2021-12-13

## 2021-12-13 NOTE — TELEPHONE ENCOUNTER
Pt wife left a VM stating that pt has a surgery with Dr. Crisostomo on 1/10/22 and they need a surgery clearance faxed to his office.

## 2021-12-28 DIAGNOSIS — F41.9 ANXIETY: ICD-10-CM

## 2021-12-28 DIAGNOSIS — Z79.4 TYPE 2 DIABETES MELLITUS WITH HYPERGLYCEMIA, WITH LONG-TERM CURRENT USE OF INSULIN (HCC): ICD-10-CM

## 2021-12-28 DIAGNOSIS — M25.552 LEFT HIP PAIN: ICD-10-CM

## 2021-12-28 DIAGNOSIS — I25.10 CORONARY ARTERY DISEASE INVOLVING NATIVE CORONARY ARTERY OF NATIVE HEART WITHOUT ANGINA PECTORIS: ICD-10-CM

## 2021-12-28 DIAGNOSIS — G47.00 INSOMNIA, UNSPECIFIED TYPE: ICD-10-CM

## 2021-12-28 DIAGNOSIS — N18.30 STAGE 3 CHRONIC KIDNEY DISEASE, UNSPECIFIED WHETHER STAGE 3A OR 3B CKD (HCC): ICD-10-CM

## 2021-12-28 DIAGNOSIS — E78.5 HYPERLIPIDEMIA, UNSPECIFIED HYPERLIPIDEMIA TYPE: ICD-10-CM

## 2021-12-28 DIAGNOSIS — Z86.73 HISTORY OF STROKE: ICD-10-CM

## 2021-12-28 DIAGNOSIS — F43.10 PTSD (POST-TRAUMATIC STRESS DISORDER): ICD-10-CM

## 2021-12-28 DIAGNOSIS — Z00.00 MEDICARE ANNUAL WELLNESS VISIT, SUBSEQUENT: ICD-10-CM

## 2021-12-28 DIAGNOSIS — Z95.1 HX OF CABG: ICD-10-CM

## 2021-12-28 DIAGNOSIS — I10 ESSENTIAL HYPERTENSION: ICD-10-CM

## 2021-12-28 DIAGNOSIS — E11.65 TYPE 2 DIABETES MELLITUS WITH HYPERGLYCEMIA, WITH LONG-TERM CURRENT USE OF INSULIN (HCC): ICD-10-CM

## 2021-12-28 RX ORDER — TRAMADOL HYDROCHLORIDE 50 MG/1
50 TABLET ORAL EVERY 8 HOURS PRN
Qty: 40 TABLET | Refills: 3 | Status: SHIPPED | OUTPATIENT
Start: 2021-12-28 | End: 2022-06-10 | Stop reason: SDUPTHER

## 2021-12-28 NOTE — TELEPHONE ENCOUNTER
Caller: Beny Verma    Relationship: Emergency Contact    Best call back number:    Requested Prescriptions:   Requested Prescriptions     Pending Prescriptions Disp Refills   • traMADol (ULTRAM) 50 MG tablet 40 tablet 0     Sig: Take 1 tablet by mouth Every 8 (Eight) Hours As Needed for Severe Pain .        Pharmacy where request should be sent:  [facility]  [address]      Additional details provided by patient: PATIENT IS OUT OF THE MEDICATION    Does the patient have less than a 3 day supply:  [x] Yes  [] No    Milton Arroyo Rep   12/28/21 10:27 EST

## 2022-01-07 ENCOUNTER — HOSPITAL ENCOUNTER (OUTPATIENT)
Dept: GENERAL RADIOLOGY | Facility: HOSPITAL | Age: 72
Discharge: HOME | End: 2022-01-07

## 2022-01-07 ENCOUNTER — PRE-ADMISSION TESTING (OUTPATIENT)
Dept: PREADMISSION TESTING | Facility: HOSPITAL | Age: 72
End: 2022-01-07

## 2022-01-07 VITALS
HEART RATE: 89 BPM | DIASTOLIC BLOOD PRESSURE: 100 MMHG | WEIGHT: 182 LBS | RESPIRATION RATE: 16 BRPM | OXYGEN SATURATION: 98 % | TEMPERATURE: 98 F | SYSTOLIC BLOOD PRESSURE: 179 MMHG | HEIGHT: 69 IN | BODY MASS INDEX: 26.96 KG/M2

## 2022-01-07 LAB
ALBUMIN SERPL-MCNC: 4.2 G/DL (ref 3.5–5.2)
ALBUMIN/GLOB SERPL: 1.2 G/DL
ALP SERPL-CCNC: 92 U/L (ref 39–117)
ALT SERPL W P-5'-P-CCNC: 20 U/L (ref 1–41)
ANION GAP SERPL CALCULATED.3IONS-SCNC: 11.1 MMOL/L (ref 5–15)
APTT PPP: 30.2 SECONDS (ref 22.7–35.4)
AST SERPL-CCNC: 19 U/L (ref 1–40)
BACTERIA UR QL AUTO: ABNORMAL /HPF
BASOPHILS # BLD AUTO: 0.06 10*3/MM3 (ref 0–0.2)
BASOPHILS NFR BLD AUTO: 0.6 % (ref 0–1.5)
BILIRUB SERPL-MCNC: 0.7 MG/DL (ref 0–1.2)
BILIRUB UR QL STRIP: NEGATIVE
BUN SERPL-MCNC: 36 MG/DL (ref 8–23)
BUN/CREAT SERPL: 22.9 (ref 7–25)
CALCIUM SPEC-SCNC: 9.6 MG/DL (ref 8.6–10.5)
CHLORIDE SERPL-SCNC: 98 MMOL/L (ref 98–107)
CLARITY UR: ABNORMAL
CO2 SERPL-SCNC: 29.9 MMOL/L (ref 22–29)
COLOR UR: YELLOW
CREAT SERPL-MCNC: 1.57 MG/DL (ref 0.76–1.27)
DEPRECATED RDW RBC AUTO: 43.7 FL (ref 37–54)
EOSINOPHIL # BLD AUTO: 0.24 10*3/MM3 (ref 0–0.4)
EOSINOPHIL NFR BLD AUTO: 2.3 % (ref 0.3–6.2)
ERYTHROCYTE [DISTWIDTH] IN BLOOD BY AUTOMATED COUNT: 12.6 % (ref 12.3–15.4)
GFR SERPL CREATININE-BSD FRML MDRD: 44 ML/MIN/1.73
GLOBULIN UR ELPH-MCNC: 3.4 GM/DL
GLUCOSE SERPL-MCNC: 185 MG/DL (ref 65–99)
GLUCOSE UR STRIP-MCNC: NEGATIVE MG/DL
HBA1C MFR BLD: 9.06 % (ref 4.8–5.6)
HCT VFR BLD AUTO: 49.8 % (ref 37.5–51)
HGB BLD-MCNC: 16.6 G/DL (ref 13–17.7)
HGB UR QL STRIP.AUTO: ABNORMAL
HYALINE CASTS UR QL AUTO: ABNORMAL /LPF
IMM GRANULOCYTES # BLD AUTO: 0.02 10*3/MM3 (ref 0–0.05)
IMM GRANULOCYTES NFR BLD AUTO: 0.2 % (ref 0–0.5)
INR PPP: 1.04 (ref 0.9–1.1)
KETONES UR QL STRIP: NEGATIVE
LEUKOCYTE ESTERASE UR QL STRIP.AUTO: ABNORMAL
LYMPHOCYTES # BLD AUTO: 2.12 10*3/MM3 (ref 0.7–3.1)
LYMPHOCYTES NFR BLD AUTO: 20.2 % (ref 19.6–45.3)
MCH RBC QN AUTO: 31.6 PG (ref 26.6–33)
MCHC RBC AUTO-ENTMCNC: 33.3 G/DL (ref 31.5–35.7)
MCV RBC AUTO: 94.7 FL (ref 79–97)
MONOCYTES # BLD AUTO: 0.77 10*3/MM3 (ref 0.1–0.9)
MONOCYTES NFR BLD AUTO: 7.4 % (ref 5–12)
NEUTROPHILS NFR BLD AUTO: 69.3 % (ref 42.7–76)
NEUTROPHILS NFR BLD AUTO: 7.26 10*3/MM3 (ref 1.7–7)
NITRITE UR QL STRIP: NEGATIVE
NRBC BLD AUTO-RTO: 0.1 /100 WBC (ref 0–0.2)
PH UR STRIP.AUTO: 5.5 [PH] (ref 5–8)
PLATELET # BLD AUTO: 244 10*3/MM3 (ref 140–450)
PMV BLD AUTO: 10.4 FL (ref 6–12)
POTASSIUM SERPL-SCNC: 5.2 MMOL/L (ref 3.5–5.2)
PROT SERPL-MCNC: 7.6 G/DL (ref 6–8.5)
PROT UR QL STRIP: NEGATIVE
PROTHROMBIN TIME: 13.4 SECONDS (ref 11.7–14.2)
QT INTERVAL: 388 MS
RBC # BLD AUTO: 5.26 10*6/MM3 (ref 4.14–5.8)
RBC # UR STRIP: ABNORMAL /HPF
REF LAB TEST METHOD: ABNORMAL
SODIUM SERPL-SCNC: 139 MMOL/L (ref 136–145)
SP GR UR STRIP: 1.01 (ref 1–1.03)
SQUAMOUS #/AREA URNS HPF: ABNORMAL /HPF
UROBILINOGEN UR QL STRIP: ABNORMAL
WBC # UR STRIP: ABNORMAL /HPF
WBC NRBC COR # BLD: 10.47 10*3/MM3 (ref 3.4–10.8)

## 2022-01-07 PROCEDURE — 81001 URINALYSIS AUTO W/SCOPE: CPT

## 2022-01-07 PROCEDURE — 71046 X-RAY EXAM CHEST 2 VIEWS: CPT

## 2022-01-07 PROCEDURE — 36415 COLL VENOUS BLD VENIPUNCTURE: CPT

## 2022-01-07 PROCEDURE — 85730 THROMBOPLASTIN TIME PARTIAL: CPT

## 2022-01-07 PROCEDURE — 93005 ELECTROCARDIOGRAM TRACING: CPT

## 2022-01-07 PROCEDURE — 73502 X-RAY EXAM HIP UNI 2-3 VIEWS: CPT

## 2022-01-07 PROCEDURE — 85025 COMPLETE CBC W/AUTO DIFF WBC: CPT

## 2022-01-07 PROCEDURE — 85610 PROTHROMBIN TIME: CPT

## 2022-01-07 PROCEDURE — 93010 ELECTROCARDIOGRAM REPORT: CPT | Performed by: INTERNAL MEDICINE

## 2022-01-07 PROCEDURE — 80053 COMPREHEN METABOLIC PANEL: CPT

## 2022-01-07 PROCEDURE — 83036 HEMOGLOBIN GLYCOSYLATED A1C: CPT

## 2022-01-07 RX ORDER — CHLORHEXIDINE GLUCONATE 500 MG/1
CLOTH TOPICAL TAKE AS DIRECTED
COMMUNITY
End: 2022-09-19

## 2022-01-07 NOTE — DISCHARGE INSTRUCTIONS
CHLORHEXIDINE CLOTH INSTRUCTIONS  The morning of surgery follow these instructions using the Chlorhexidine cloths you've been given.  These steps reduce bacteria on the body.  Do not use the cloths near your eyes, ears mouth, genitalia or on open wounds.  Throw the cloths away after use but do not try to flush them down a toilet.      • Open and remove one cloth at a time from the package.    • Leave the cloth unfolded and begin the bathing.  • Massage the skin with the cloths using gentle pressure to remove bacteria.  Do not scrub harshly.   • Follow the steps below with one 2% CHG cloth per area (6 total cloths).  • One cloth for neck, shoulders and chest.  • One cloth for both arms, hands, fingers and underarms (do underarms last).  • One cloth for the abdomen followed by groin.  • One cloth for right leg and foot including between the toes.  • One cloth for left leg and foot including between the toes.  • The last cloth is to be used for the back of the neck, back and buttocks.    Allow the CHG to air dry 3 minutes on the skin which will give it time to work and decrease the chance of irritation.  The skin may feel sticky until it is dry.  Do not rinse with water or any other liquid or you will lose the beneficial effects of the CHG.  If mild skin irritation occurs, do rinse the skin to remove the CHG.  Report this to the nurse at time of admission.  Do not apply lotions, creams, ointments, deodorants or perfumes after using the clothes. Dress in clean clothes before coming to the hospital.    BACTROBAN NASAL OINTMENT  There are many germs normally in your nose. Bactroban is an ointment that will help reduce these germs. Please follow these instructions for Bactroban use:      ____The day before surgery in the morning  Date________    ____The day before surgery in the evening              Date________    ____The day of surgery in the morning    Date________    **Squirt ½ package of Bactroban Ointment onto a  cotton applicator and apply to inside of 1st nostril.  Squirt the remaining Bactroban and apply to the inside of the other nostril.    Take the following medications the morning of surgery: NONE      ARRIVE AT 6:00 AM.      If you are on prescription narcotic pain medication to control your pain you may also take that medication the morning of surgery.    General Instructions:  • Do not eat solid food after midnight the night before surgery.  • You may drink clear liquids day of surgery but must stop at least one hour before your hospital arrival time.  • It is beneficial for you to have a clear drink that contains carbohydrates the day of surgery.  We suggest a 12 to 20 ounce bottle of Gatorade or Powerade for non-diabetic patients or a 12 to 20 ounce bottle of G2 or Powerade Zero for diabetic patients. (Pediatric patients, are not advised to drink a 12 to 20 ounce carbohydrate drink)    Clear liquids are liquids you can see through.  Nothing red in color.     Plain water                               Sports drinks  Sodas                                   Gelatin (Jell-O)  Fruit juices without pulp such as white grape juice and apple juice  Popsicles that contain no fruit or yogurt  Tea or coffee (no cream or milk added)  Gatorade / Powerade  G2 / Powerade Zero    • Patients who avoid smoking, chewing tobacco and alcohol for 4 weeks prior to surgery have a reduced risk of post-operative complications.  Quit smoking as many days before surgery as you can.  • Do not smoke, use chewing tobacco or drink alcohol the day of surgery.   • If applicable bring your C-PAP/ BI-PAP machine.  • Bring any papers given to you in the doctor’s office.  • Wear clean comfortable clothes.  • Do not wear contact lenses, false eyelashes or make-up.  Bring a case for your glasses.   • Bring crutches or walker if applicable.  • Remove all piercings.  Leave jewelry and any other valuables at home.  • Hair extensions with metal clips must be  removed prior to surgery.  • The Pre-Admission Testing nurse will instruct you to bring medications if unable to obtain an accurate list in Pre-Admission Testing.        Preventing a Surgical Site Infection:  • For 2 to 3 days before surgery, avoid shaving with a razor because the razor can irritate skin and make it easier to develop an infection.    • Any areas of open skin can increase the risk of a post-operative wound infection by allowing bacteria to enter and travel throughout the body.  Notify your surgeon if you have any skin wounds / rashes even if it is not near the expected surgical site.  The area will need assessed to determine if surgery should be delayed until it is healed.  • The night prior to surgery shower using a fresh bar of anti-bacterial soap (such as Dial) and clean washcloth.  Sleep in a clean bed with clean clothing.  Do not allow pets to sleep with you.  • Shower on the morning of surgery using a fresh bar of anti-bacterial soap (such as Dial) and clean washcloth.  Dry with a clean towel and dress in clean clothing.  • Ask your surgeon if you will be receiving antibiotics prior to surgery.  • Make sure you, your family, and all healthcare providers clean their hands with soap and water or an alcohol based hand  before caring for you or your wound.    Day of surgery:  Your arrival time is approximately two hours before your scheduled surgery time.  Upon arrival, a Pre-op nurse and Anesthesiologist will review your health history, obtain vital signs, and answer questions you may have.  The only belongings needed at this time will be a list of your home medications and if applicable your C-PAP/BI-PAP machine.  A Pre-op nurse will start an IV and you may receive medication in preparation for surgery, including something to help you relax.     Please be aware that surgery does come with discomfort.  We want to make every effort to control your discomfort so please discuss any  uncontrolled symptoms with your nurse.   Your doctor will most likely have prescribed pain medications.      If you are going home after surgery you will receive individualized written care instructions before being discharged.  A responsible adult must drive you to and from the hospital on the day of your surgery and stay with you for 24 hours.  Discharge prescriptions can be filled by the hospital pharmacy during regular pharmacy hours.  If you are having surgery late in the day/evening your prescription may be e-prescribed to your pharmacy.  Please verify your pharmacy hours or chose a 24 hour pharmacy to avoid not having access to your prescription because your pharmacy has closed for the day.    If you are staying overnight following surgery, you will be transported to your hospital room following the recovery period.  [facility] has all private rooms.    If you have any questions please call Pre-Admission Testing at [phone].  Deductibles and co-payments are collected on the day of service. Please be prepared to pay the required co-pay, deductible or deposit on the day of service as defined by your plan.    Patient Education for Self-Quarantine Process    • Following your COVID testing, we strongly recommend that you wear a mask when you are with other people and practice social distancing.   • Limit your activities to only required outings.  • Wash your hands with soap and water frequently for at least 20 seconds.   • Avoid touching your eyes, nose and mouth with unwashed hands.  • Do not share anything - utensils, drinking glasses, food from the same bowl.   • Sanitize household surfaces daily. Include all high touch areas (door handles, light switches, phones, countertops, etc.)    Call your surgeon immediately if you experience any of the following symptoms:  • Sore Throat  • Shortness of Breath or difficulty breathing  • Cough  • Chills  • Body soreness or muscle  pain  • Headache  • Fever  • New loss of taste or smell  • Do not arrive for your surgery ill.  Your procedure will need to be rescheduled to another time.  You will need to call your physician before the day of surgery to avoid any unnecessary exposure to hospital staff as well as other patients.

## 2022-01-08 ENCOUNTER — LAB (OUTPATIENT)
Dept: LAB | Facility: HOSPITAL | Age: 72
End: 2022-01-08

## 2022-01-08 LAB — SARS-COV-2 ORF1AB RESP QL NAA+PROBE: NOT DETECTED

## 2022-01-08 PROCEDURE — U0004 COV-19 TEST NON-CDC HGH THRU: HCPCS

## 2022-01-08 PROCEDURE — C9803 HOPD COVID-19 SPEC COLLECT: HCPCS

## 2022-01-11 ENCOUNTER — APPOINTMENT (OUTPATIENT)
Dept: GENERAL RADIOLOGY | Facility: HOSPITAL | Age: 72
End: 2022-01-11

## 2022-01-11 ENCOUNTER — ANESTHESIA (OUTPATIENT)
Dept: PERIOP | Facility: HOSPITAL | Age: 72
End: 2022-01-11

## 2022-01-11 ENCOUNTER — ANESTHESIA EVENT (OUTPATIENT)
Dept: PERIOP | Facility: HOSPITAL | Age: 72
End: 2022-01-11

## 2022-01-11 ENCOUNTER — HOSPITAL ENCOUNTER (OUTPATIENT)
Facility: HOSPITAL | Age: 72
Discharge: HOME | End: 2022-01-13
Attending: ORTHOPAEDIC SURGERY | Admitting: ORTHOPAEDIC SURGERY

## 2022-01-11 PROBLEM — Z96.649 STATUS POST TOTAL REPLACEMENT OF HIP: Status: ACTIVE | Noted: 2022-01-11

## 2022-01-11 LAB
GLUCOSE BLDC GLUCOMTR-MCNC: 155 MG/DL (ref 70–130)
GLUCOSE BLDC GLUCOMTR-MCNC: 175 MG/DL (ref 70–130)
GLUCOSE BLDC GLUCOMTR-MCNC: 332 MG/DL (ref 70–130)
GLUCOSE BLDC GLUCOMTR-MCNC: 347 MG/DL (ref 70–130)
GLUCOSE BLDC GLUCOMTR-MCNC: 375 MG/DL (ref 70–130)
HCT VFR BLD AUTO: 39.2 % (ref 37.5–51)
HGB BLD-MCNC: 13.4 G/DL (ref 13–17.7)

## 2022-01-11 PROCEDURE — 97162 PT EVAL MOD COMPLEX 30 MIN: CPT

## 2022-01-11 PROCEDURE — 73501 X-RAY EXAM HIP UNI 1 VIEW: CPT | Performed by: ORTHOPAEDIC SURGERY

## 2022-01-11 PROCEDURE — 25010000002 PHENYLEPHRINE 10 MG/ML SOLUTION: Performed by: ANESTHESIOLOGY

## 2022-01-11 PROCEDURE — C1713 ANCHOR/SCREW BN/BN,TIS/BN: HCPCS | Performed by: ORTHOPAEDIC SURGERY

## 2022-01-11 PROCEDURE — 25010000002 FENTANYL CITRATE (PF) 50 MCG/ML SOLUTION: Performed by: ANESTHESIOLOGY

## 2022-01-11 PROCEDURE — 25010000002 HYDROMORPHONE PER 4 MG: Performed by: ANESTHESIOLOGY

## 2022-01-11 PROCEDURE — 0 CEFAZOLIN IN DEXTROSE 2-4 GM/100ML-% SOLUTION: Performed by: ORTHOPAEDIC SURGERY

## 2022-01-11 PROCEDURE — 25010000002 ALBUMIN HUMAN 5% PER 50 ML: Performed by: ANESTHESIOLOGY

## 2022-01-11 PROCEDURE — G0378 HOSPITAL OBSERVATION PER HR: HCPCS

## 2022-01-11 PROCEDURE — 85018 HEMOGLOBIN: CPT | Performed by: ORTHOPAEDIC SURGERY

## 2022-01-11 PROCEDURE — 25010000002 PHENYLEPHRINE 10 MG/ML SOLUTION 5 ML VIAL: Performed by: ANESTHESIOLOGY

## 2022-01-11 PROCEDURE — C1776 JOINT DEVICE (IMPLANTABLE): HCPCS | Performed by: ORTHOPAEDIC SURGERY

## 2022-01-11 PROCEDURE — 76000 FLUOROSCOPY <1 HR PHYS/QHP: CPT | Performed by: ORTHOPAEDIC SURGERY

## 2022-01-11 PROCEDURE — 63710000001 INSULIN LISPRO (HUMAN) PER 5 UNITS: Performed by: HOSPITALIST

## 2022-01-11 PROCEDURE — P9041 ALBUMIN (HUMAN),5%, 50ML: HCPCS | Performed by: ANESTHESIOLOGY

## 2022-01-11 PROCEDURE — 82962 GLUCOSE BLOOD TEST: CPT

## 2022-01-11 PROCEDURE — 25010000002 ONDANSETRON PER 1 MG: Performed by: ANESTHESIOLOGY

## 2022-01-11 PROCEDURE — 85014 HEMATOCRIT: CPT | Performed by: ORTHOPAEDIC SURGERY

## 2022-01-11 PROCEDURE — 25010000002 PROPOFOL 10 MG/ML EMULSION: Performed by: ANESTHESIOLOGY

## 2022-01-11 PROCEDURE — 25010000002 ROPIVACAINE PER 1 MG: Performed by: ORTHOPAEDIC SURGERY

## 2022-01-11 PROCEDURE — C1889 IMPLANT/INSERT DEVICE, NOC: HCPCS | Performed by: ORTHOPAEDIC SURGERY

## 2022-01-11 PROCEDURE — 25010000002 EPINEPHRINE 1 MG/ML SOLUTION 30 ML VIAL: Performed by: ORTHOPAEDIC SURGERY

## 2022-01-11 PROCEDURE — 97110 THERAPEUTIC EXERCISES: CPT

## 2022-01-11 PROCEDURE — 25010000002 KETOROLAC TROMETHAMINE PER 15 MG: Performed by: ORTHOPAEDIC SURGERY

## 2022-01-11 PROCEDURE — 72170 X-RAY EXAM OF PELVIS: CPT

## 2022-01-11 PROCEDURE — 25010000002 KETOROLAC TROMETHAMINE PER 15 MG: Performed by: ANESTHESIOLOGY

## 2022-01-11 PROCEDURE — 63710000001 INSULIN LISPRO (HUMAN) PER 5 UNITS: Performed by: NURSE PRACTITIONER

## 2022-01-11 PROCEDURE — 25010000002 DEXAMETHASONE PER 1 MG: Performed by: ANESTHESIOLOGY

## 2022-01-11 DEVICE — REFLECTION SPHERICAL HEAD SCREW 25MM
Type: IMPLANTABLE DEVICE | Site: HIP | Status: FUNCTIONAL
Brand: REFLECTION

## 2022-01-11 DEVICE — OXINIUM FEMORAL HEAD 12/14 TAPER                                    28 MM +0
Type: IMPLANTABLE DEVICE | Site: HIP | Status: FUNCTIONAL
Brand: OXINIUM

## 2022-01-11 DEVICE — DEV CONTRL TISS STRATAFIX SYMM PDS PLUS VIL CT-1 60CM: Type: IMPLANTABLE DEVICE | Site: HIP | Status: FUNCTIONAL

## 2022-01-11 DEVICE — R3 3 HOLE ACETABULAR SHELL 56MM
Type: IMPLANTABLE DEVICE | Site: HIP | Status: FUNCTIONAL
Brand: R3 ACETABULAR

## 2022-01-11 DEVICE — OR3O DUAL MOBILITY LINER 44/56
Type: IMPLANTABLE DEVICE | Site: HIP | Status: FUNCTIONAL
Brand: OR3O DUAL MOBILITY

## 2022-01-11 DEVICE — OR3O DUAL MOBILITY XLPE INSERT 28/44
Type: IMPLANTABLE DEVICE | Site: HIP | Status: FUNCTIONAL
Brand: OR3O DUAL MOBILITY

## 2022-01-11 DEVICE — REDAPT 190MM SLEEVELESS REVISION                                    STEM SIZE 18 STANDARD OFFSET
Type: IMPLANTABLE DEVICE | Site: HIP | Status: FUNCTIONAL
Brand: REDAPT

## 2022-01-11 DEVICE — ACCORD 2.0MM COBALT CHROME CABLE                                    WITH CLAMP
Type: IMPLANTABLE DEVICE | Site: HIP | Status: FUNCTIONAL
Brand: ACCORD

## 2022-01-11 DEVICE — DEV CONTRL TISS STRATAFIX SPIRAL PDS PLS CT1 2-0 45CM: Type: IMPLANTABLE DEVICE | Site: HIP | Status: FUNCTIONAL

## 2022-01-11 RX ORDER — MIDAZOLAM HYDROCHLORIDE 1 MG/ML
0.5 INJECTION INTRAMUSCULAR; INTRAVENOUS
Status: DISCONTINUED | OUTPATIENT
Start: 2022-01-11 | End: 2022-01-11 | Stop reason: HOSPADM

## 2022-01-11 RX ORDER — SODIUM CHLORIDE, SODIUM LACTATE, POTASSIUM CHLORIDE, CALCIUM CHLORIDE 600; 310; 30; 20 MG/100ML; MG/100ML; MG/100ML; MG/100ML
9 INJECTION, SOLUTION INTRAVENOUS CONTINUOUS
Status: DISCONTINUED | OUTPATIENT
Start: 2022-01-11 | End: 2022-01-13 | Stop reason: HOSPADM

## 2022-01-11 RX ORDER — NALOXONE HCL 0.4 MG/ML
0.1 VIAL (ML) INJECTION
Status: DISCONTINUED | OUTPATIENT
Start: 2022-01-11 | End: 2022-01-13 | Stop reason: HOSPADM

## 2022-01-11 RX ORDER — DEXAMETHASONE SODIUM PHOSPHATE 10 MG/ML
INJECTION INTRAMUSCULAR; INTRAVENOUS AS NEEDED
Status: DISCONTINUED | OUTPATIENT
Start: 2022-01-11 | End: 2022-01-11 | Stop reason: SURG

## 2022-01-11 RX ORDER — ONDANSETRON 2 MG/ML
INJECTION INTRAMUSCULAR; INTRAVENOUS AS NEEDED
Status: DISCONTINUED | OUTPATIENT
Start: 2022-01-11 | End: 2022-01-11 | Stop reason: SURG

## 2022-01-11 RX ORDER — CEFAZOLIN SODIUM 2 G/100ML
2 INJECTION, SOLUTION INTRAVENOUS EVERY 8 HOURS
Status: COMPLETED | OUTPATIENT
Start: 2022-01-11 | End: 2022-01-12

## 2022-01-11 RX ORDER — SPIRONOLACTONE 25 MG/1
25 TABLET ORAL DAILY
Status: DISCONTINUED | OUTPATIENT
Start: 2022-01-11 | End: 2022-01-13 | Stop reason: HOSPADM

## 2022-01-11 RX ORDER — FENTANYL CITRATE 50 UG/ML
50 INJECTION, SOLUTION INTRAMUSCULAR; INTRAVENOUS
Status: DISCONTINUED | OUTPATIENT
Start: 2022-01-11 | End: 2022-01-11 | Stop reason: HOSPADM

## 2022-01-11 RX ORDER — FLUMAZENIL 0.1 MG/ML
0.2 INJECTION INTRAVENOUS AS NEEDED
Status: DISCONTINUED | OUTPATIENT
Start: 2022-01-11 | End: 2022-01-11 | Stop reason: HOSPADM

## 2022-01-11 RX ORDER — PROMETHAZINE HYDROCHLORIDE 25 MG/1
25 TABLET ORAL ONCE AS NEEDED
Status: DISCONTINUED | OUTPATIENT
Start: 2022-01-11 | End: 2022-01-11 | Stop reason: HOSPADM

## 2022-01-11 RX ORDER — INSULIN LISPRO 100 [IU]/ML
0-9 INJECTION, SOLUTION INTRAVENOUS; SUBCUTANEOUS
Status: DISCONTINUED | OUTPATIENT
Start: 2022-01-11 | End: 2022-01-13 | Stop reason: HOSPADM

## 2022-01-11 RX ORDER — LIDOCAINE HYDROCHLORIDE 20 MG/ML
INJECTION, SOLUTION INFILTRATION; PERINEURAL AS NEEDED
Status: DISCONTINUED | OUTPATIENT
Start: 2022-01-11 | End: 2022-01-11 | Stop reason: SURG

## 2022-01-11 RX ORDER — CEFAZOLIN SODIUM 2 G/100ML
2 INJECTION, SOLUTION INTRAVENOUS ONCE
Status: COMPLETED | OUTPATIENT
Start: 2022-01-11 | End: 2022-01-11

## 2022-01-11 RX ORDER — ASPIRIN 81 MG/1
81 TABLET ORAL DAILY
Status: DISCONTINUED | OUTPATIENT
Start: 2022-01-11 | End: 2022-01-13 | Stop reason: HOSPADM

## 2022-01-11 RX ORDER — HYDROMORPHONE HYDROCHLORIDE 1 MG/ML
0.5 INJECTION, SOLUTION INTRAMUSCULAR; INTRAVENOUS; SUBCUTANEOUS
Status: DISCONTINUED | OUTPATIENT
Start: 2022-01-11 | End: 2022-01-11 | Stop reason: HOSPADM

## 2022-01-11 RX ORDER — ONDANSETRON 2 MG/ML
4 INJECTION INTRAMUSCULAR; INTRAVENOUS ONCE AS NEEDED
Status: DISCONTINUED | OUTPATIENT
Start: 2022-01-11 | End: 2022-01-11 | Stop reason: HOSPADM

## 2022-01-11 RX ORDER — EPHEDRINE SULFATE 50 MG/ML
5 INJECTION, SOLUTION INTRAVENOUS ONCE AS NEEDED
Status: DISCONTINUED | OUTPATIENT
Start: 2022-01-11 | End: 2022-01-11 | Stop reason: HOSPADM

## 2022-01-11 RX ORDER — ROCURONIUM BROMIDE 10 MG/ML
INJECTION, SOLUTION INTRAVENOUS AS NEEDED
Status: DISCONTINUED | OUTPATIENT
Start: 2022-01-11 | End: 2022-01-11 | Stop reason: SURG

## 2022-01-11 RX ORDER — FAMOTIDINE 10 MG/ML
20 INJECTION, SOLUTION INTRAVENOUS ONCE
Status: COMPLETED | OUTPATIENT
Start: 2022-01-11 | End: 2022-01-11

## 2022-01-11 RX ORDER — DIPHENHYDRAMINE HYDROCHLORIDE 50 MG/ML
12.5 INJECTION INTRAMUSCULAR; INTRAVENOUS
Status: DISCONTINUED | OUTPATIENT
Start: 2022-01-11 | End: 2022-01-11 | Stop reason: HOSPADM

## 2022-01-11 RX ORDER — SODIUM CHLORIDE 0.9 % (FLUSH) 0.9 %
3 SYRINGE (ML) INJECTION EVERY 12 HOURS SCHEDULED
Status: DISCONTINUED | OUTPATIENT
Start: 2022-01-11 | End: 2022-01-11 | Stop reason: HOSPADM

## 2022-01-11 RX ORDER — OXYCODONE HYDROCHLORIDE 5 MG/1
5 TABLET ORAL ONCE
Status: COMPLETED | OUTPATIENT
Start: 2022-01-11 | End: 2022-01-11

## 2022-01-11 RX ORDER — LABETALOL HYDROCHLORIDE 5 MG/ML
5 INJECTION, SOLUTION INTRAVENOUS
Status: DISCONTINUED | OUTPATIENT
Start: 2022-01-11 | End: 2022-01-11 | Stop reason: HOSPADM

## 2022-01-11 RX ORDER — KETOROLAC TROMETHAMINE 30 MG/ML
INJECTION, SOLUTION INTRAMUSCULAR; INTRAVENOUS AS NEEDED
Status: DISCONTINUED | OUTPATIENT
Start: 2022-01-11 | End: 2022-01-11 | Stop reason: SURG

## 2022-01-11 RX ORDER — SODIUM CHLORIDE 9 MG/ML
100 INJECTION, SOLUTION INTRAVENOUS CONTINUOUS
Status: DISCONTINUED | OUTPATIENT
Start: 2022-01-11 | End: 2022-01-13 | Stop reason: HOSPADM

## 2022-01-11 RX ORDER — HYDRALAZINE HYDROCHLORIDE 20 MG/ML
5 INJECTION INTRAMUSCULAR; INTRAVENOUS
Status: DISCONTINUED | OUTPATIENT
Start: 2022-01-11 | End: 2022-01-11 | Stop reason: HOSPADM

## 2022-01-11 RX ORDER — NICOTINE POLACRILEX 4 MG
15 LOZENGE BUCCAL
Status: DISCONTINUED | OUTPATIENT
Start: 2022-01-11 | End: 2022-01-13 | Stop reason: HOSPADM

## 2022-01-11 RX ORDER — CARVEDILOL 12.5 MG/1
1 TABLET ORAL 2 TIMES DAILY WITH MEALS
Status: ON HOLD | COMMUNITY
Start: 2021-08-09 | End: 2022-01-11

## 2022-01-11 RX ORDER — ALBUMIN, HUMAN INJ 5% 5 %
SOLUTION INTRAVENOUS CONTINUOUS PRN
Status: DISCONTINUED | OUTPATIENT
Start: 2022-01-11 | End: 2022-01-11 | Stop reason: SURG

## 2022-01-11 RX ORDER — HYDROCODONE BITARTRATE AND ACETAMINOPHEN 5; 325 MG/1; MG/1
1 TABLET ORAL EVERY 4 HOURS PRN
Status: DISCONTINUED | OUTPATIENT
Start: 2022-01-11 | End: 2022-01-13 | Stop reason: HOSPADM

## 2022-01-11 RX ORDER — ACETAMINOPHEN 500 MG
1000 TABLET ORAL ONCE
Status: COMPLETED | OUTPATIENT
Start: 2022-01-11 | End: 2022-01-11

## 2022-01-11 RX ORDER — NALOXONE HCL 0.4 MG/ML
0.2 VIAL (ML) INJECTION AS NEEDED
Status: DISCONTINUED | OUTPATIENT
Start: 2022-01-11 | End: 2022-01-11 | Stop reason: HOSPADM

## 2022-01-11 RX ORDER — PROMETHAZINE HYDROCHLORIDE 25 MG/1
25 SUPPOSITORY RECTAL ONCE AS NEEDED
Status: DISCONTINUED | OUTPATIENT
Start: 2022-01-11 | End: 2022-01-11 | Stop reason: HOSPADM

## 2022-01-11 RX ORDER — FENTANYL CITRATE 50 UG/ML
INJECTION, SOLUTION INTRAMUSCULAR; INTRAVENOUS AS NEEDED
Status: DISCONTINUED | OUTPATIENT
Start: 2022-01-11 | End: 2022-01-11 | Stop reason: SURG

## 2022-01-11 RX ORDER — SODIUM CHLORIDE 0.9 % (FLUSH) 0.9 %
3-10 SYRINGE (ML) INJECTION AS NEEDED
Status: DISCONTINUED | OUTPATIENT
Start: 2022-01-11 | End: 2022-01-11 | Stop reason: HOSPADM

## 2022-01-11 RX ORDER — DOCUSATE SODIUM 100 MG/1
100 CAPSULE, LIQUID FILLED ORAL 2 TIMES DAILY PRN
Status: DISCONTINUED | OUTPATIENT
Start: 2022-01-11 | End: 2022-01-13 | Stop reason: HOSPADM

## 2022-01-11 RX ORDER — PHENYLEPHRINE HYDROCHLORIDE 10 MG/ML
INJECTION INTRAVENOUS AS NEEDED
Status: DISCONTINUED | OUTPATIENT
Start: 2022-01-11 | End: 2022-01-11 | Stop reason: SURG

## 2022-01-11 RX ORDER — HYDROMORPHONE HCL 110MG/55ML
PATIENT CONTROLLED ANALGESIA SYRINGE INTRAVENOUS AS NEEDED
Status: DISCONTINUED | OUTPATIENT
Start: 2022-01-11 | End: 2022-01-11 | Stop reason: SURG

## 2022-01-11 RX ORDER — ONDANSETRON 2 MG/ML
4 INJECTION INTRAMUSCULAR; INTRAVENOUS EVERY 6 HOURS PRN
Status: DISCONTINUED | OUTPATIENT
Start: 2022-01-11 | End: 2022-01-13 | Stop reason: HOSPADM

## 2022-01-11 RX ORDER — IBUPROFEN 600 MG/1
600 TABLET ORAL ONCE AS NEEDED
Status: DISCONTINUED | OUTPATIENT
Start: 2022-01-11 | End: 2022-01-11 | Stop reason: HOSPADM

## 2022-01-11 RX ORDER — HYDROCODONE BITARTRATE AND ACETAMINOPHEN 7.5; 325 MG/1; MG/1
1 TABLET ORAL ONCE AS NEEDED
Status: DISCONTINUED | OUTPATIENT
Start: 2022-01-11 | End: 2022-01-11 | Stop reason: HOSPADM

## 2022-01-11 RX ORDER — MELOXICAM 15 MG/1
15 TABLET ORAL DAILY
Status: DISCONTINUED | OUTPATIENT
Start: 2022-01-11 | End: 2022-01-13 | Stop reason: HOSPADM

## 2022-01-11 RX ORDER — DIPHENHYDRAMINE HCL 25 MG
25 CAPSULE ORAL
Status: DISCONTINUED | OUTPATIENT
Start: 2022-01-11 | End: 2022-01-11 | Stop reason: HOSPADM

## 2022-01-11 RX ORDER — KETAMINE HYDROCHLORIDE 10 MG/ML
INJECTION INTRAMUSCULAR; INTRAVENOUS AS NEEDED
Status: DISCONTINUED | OUTPATIENT
Start: 2022-01-11 | End: 2022-01-11 | Stop reason: SURG

## 2022-01-11 RX ORDER — BLOOD SUGAR DIAGNOSTIC
1 STRIP MISCELLANEOUS 3 TIMES DAILY
COMMUNITY
Start: 2021-07-23 | End: 2022-01-19

## 2022-01-11 RX ORDER — CELECOXIB 200 MG/1
200 CAPSULE ORAL ONCE
Status: COMPLETED | OUTPATIENT
Start: 2022-01-11 | End: 2022-01-11

## 2022-01-11 RX ORDER — INSULIN LISPRO 100 [IU]/ML
0-9 INJECTION, SOLUTION INTRAVENOUS; SUBCUTANEOUS
Status: DISCONTINUED | OUTPATIENT
Start: 2022-01-12 | End: 2022-01-11

## 2022-01-11 RX ORDER — INSULIN LISPRO 100 [IU]/ML
0-9 INJECTION, SOLUTION INTRAVENOUS; SUBCUTANEOUS
Status: DISCONTINUED | OUTPATIENT
Start: 2022-01-11 | End: 2022-01-11

## 2022-01-11 RX ORDER — PROPOFOL 10 MG/ML
VIAL (ML) INTRAVENOUS AS NEEDED
Status: DISCONTINUED | OUTPATIENT
Start: 2022-01-11 | End: 2022-01-11 | Stop reason: SURG

## 2022-01-11 RX ORDER — FAMOTIDINE 20 MG/1
40 TABLET, FILM COATED ORAL DAILY
Status: DISCONTINUED | OUTPATIENT
Start: 2022-01-11 | End: 2022-01-13 | Stop reason: HOSPADM

## 2022-01-11 RX ORDER — TRANEXAMIC ACID 100 MG/ML
1000 INJECTION, SOLUTION INTRAVENOUS ONCE
Status: COMPLETED | OUTPATIENT
Start: 2022-01-11 | End: 2022-01-11

## 2022-01-11 RX ORDER — BUMETANIDE 1 MG/1
TABLET ORAL
Status: ON HOLD | COMMUNITY
End: 2022-01-11

## 2022-01-11 RX ORDER — DEXTROSE MONOHYDRATE 25 G/50ML
25 INJECTION, SOLUTION INTRAVENOUS
Status: DISCONTINUED | OUTPATIENT
Start: 2022-01-11 | End: 2022-01-13 | Stop reason: HOSPADM

## 2022-01-11 RX ORDER — LIDOCAINE HYDROCHLORIDE 10 MG/ML
0.5 INJECTION, SOLUTION EPIDURAL; INFILTRATION; INTRACAUDAL; PERINEURAL ONCE AS NEEDED
Status: DISCONTINUED | OUTPATIENT
Start: 2022-01-11 | End: 2022-01-11 | Stop reason: HOSPADM

## 2022-01-11 RX ORDER — ONDANSETRON 4 MG/1
4 TABLET, FILM COATED ORAL EVERY 6 HOURS PRN
Status: DISCONTINUED | OUTPATIENT
Start: 2022-01-11 | End: 2022-01-13 | Stop reason: HOSPADM

## 2022-01-11 RX ORDER — OXYCODONE AND ACETAMINOPHEN 7.5; 325 MG/1; MG/1
1 TABLET ORAL EVERY 4 HOURS PRN
Status: DISCONTINUED | OUTPATIENT
Start: 2022-01-11 | End: 2022-01-11 | Stop reason: HOSPADM

## 2022-01-11 RX ORDER — HYDROCODONE BITARTRATE AND ACETAMINOPHEN 5; 325 MG/1; MG/1
2 TABLET ORAL EVERY 4 HOURS PRN
Status: DISCONTINUED | OUTPATIENT
Start: 2022-01-11 | End: 2022-01-13 | Stop reason: HOSPADM

## 2022-01-11 RX ADMIN — PROPOFOL 50 MG: 10 INJECTION, EMULSION INTRAVENOUS at 08:28

## 2022-01-11 RX ADMIN — ALBUMIN HUMAN: 0.05 INJECTION, SOLUTION INTRAVENOUS at 09:00

## 2022-01-11 RX ADMIN — PHENYLEPHRINE HYDROCHLORIDE 100 MCG: 10 INJECTION, SOLUTION INTRAVENOUS at 08:30

## 2022-01-11 RX ADMIN — FENTANYL CITRATE 100 MCG: 0.05 INJECTION, SOLUTION INTRAMUSCULAR; INTRAVENOUS at 08:54

## 2022-01-11 RX ADMIN — ROCURONIUM BROMIDE 50 MG: 50 INJECTION INTRAVENOUS at 08:30

## 2022-01-11 RX ADMIN — KETAMINE HYDROCHLORIDE 20 MG: 10 INJECTION INTRAMUSCULAR; INTRAVENOUS at 08:45

## 2022-01-11 RX ADMIN — PROPOFOL 100 MG: 10 INJECTION, EMULSION INTRAVENOUS at 08:30

## 2022-01-11 RX ADMIN — OXYCODONE HYDROCHLORIDE 5 MG: 5 TABLET ORAL at 07:55

## 2022-01-11 RX ADMIN — SODIUM CHLORIDE 100 ML/HR: 9 INJECTION, SOLUTION INTRAVENOUS at 21:29

## 2022-01-11 RX ADMIN — DEXAMETHASONE SODIUM PHOSPHATE 8 MG: 10 INJECTION INTRAMUSCULAR; INTRAVENOUS at 08:59

## 2022-01-11 RX ADMIN — INSULIN LISPRO 7 UNITS: 100 INJECTION, SOLUTION INTRAVENOUS; SUBCUTANEOUS at 17:18

## 2022-01-11 RX ADMIN — ACETAMINOPHEN 1000 MG: 500 TABLET ORAL at 07:55

## 2022-01-11 RX ADMIN — ASPIRIN 81 MG: 81 TABLET, COATED ORAL at 21:29

## 2022-01-11 RX ADMIN — PHENYLEPHRINE HYDROCHLORIDE 100 MCG: 10 INJECTION, SOLUTION INTRAVENOUS at 10:27

## 2022-01-11 RX ADMIN — PROPOFOL 50 MG: 10 INJECTION, EMULSION INTRAVENOUS at 08:25

## 2022-01-11 RX ADMIN — PHENYLEPHRINE HYDROCHLORIDE 100 MCG: 10 INJECTION, SOLUTION INTRAVENOUS at 09:54

## 2022-01-11 RX ADMIN — PHENYLEPHRINE HYDROCHLORIDE 100 MCG: 10 INJECTION, SOLUTION INTRAVENOUS at 10:31

## 2022-01-11 RX ADMIN — INSULIN GLARGINE-YFGN 30 UNITS: 100 INJECTION, SOLUTION SUBCUTANEOUS at 21:03

## 2022-01-11 RX ADMIN — ALBUMIN HUMAN: 0.05 INJECTION, SOLUTION INTRAVENOUS at 09:15

## 2022-01-11 RX ADMIN — LIDOCAINE HYDROCHLORIDE 100 MG: 20 INJECTION, SOLUTION INFILTRATION; PERINEURAL at 08:30

## 2022-01-11 RX ADMIN — PHENYLEPHRINE HYDROCHLORIDE 0.25 MCG/KG/MIN: 10 INJECTION INTRAVENOUS at 08:56

## 2022-01-11 RX ADMIN — SUGAMMADEX 200 MG: 100 INJECTION, SOLUTION INTRAVENOUS at 10:11

## 2022-01-11 RX ADMIN — KETAMINE HYDROCHLORIDE 10 MG: 10 INJECTION INTRAMUSCULAR; INTRAVENOUS at 09:58

## 2022-01-11 RX ADMIN — FENTANYL CITRATE 50 MCG: 50 INJECTION INTRAMUSCULAR; INTRAVENOUS at 10:50

## 2022-01-11 RX ADMIN — PHENYLEPHRINE HYDROCHLORIDE 200 MCG: 10 INJECTION, SOLUTION INTRAVENOUS at 09:35

## 2022-01-11 RX ADMIN — FAMOTIDINE 20 MG: 10 INJECTION INTRAVENOUS at 07:55

## 2022-01-11 RX ADMIN — HYDROMORPHONE HYDROCHLORIDE 0.5 MG: 1 INJECTION, SOLUTION INTRAMUSCULAR; INTRAVENOUS; SUBCUTANEOUS at 11:17

## 2022-01-11 RX ADMIN — INSULIN LISPRO 8 UNITS: 100 INJECTION, SOLUTION INTRAVENOUS; SUBCUTANEOUS at 21:46

## 2022-01-11 RX ADMIN — CELECOXIB 200 MG: 200 CAPSULE ORAL at 07:55

## 2022-01-11 RX ADMIN — SODIUM CHLORIDE, POTASSIUM CHLORIDE, SODIUM LACTATE AND CALCIUM CHLORIDE: 600; 310; 30; 20 INJECTION, SOLUTION INTRAVENOUS at 09:04

## 2022-01-11 RX ADMIN — HYDROMORPHONE HYDROCHLORIDE 1 MG: 2 INJECTION, SOLUTION INTRAMUSCULAR; INTRAVENOUS; SUBCUTANEOUS at 09:28

## 2022-01-11 RX ADMIN — TRANEXAMIC ACID 1000 MG: 1 INJECTION, SOLUTION INTRAVENOUS at 08:47

## 2022-01-11 RX ADMIN — CEFAZOLIN SODIUM 2 G: 2 INJECTION, SOLUTION INTRAVENOUS at 08:01

## 2022-01-11 RX ADMIN — CEFAZOLIN SODIUM 2 G: 2 INJECTION, SOLUTION INTRAVENOUS at 17:18

## 2022-01-11 RX ADMIN — ONDANSETRON 4 MG: 2 INJECTION INTRAMUSCULAR; INTRAVENOUS at 10:16

## 2022-01-11 RX ADMIN — SODIUM CHLORIDE, POTASSIUM CHLORIDE, SODIUM LACTATE AND CALCIUM CHLORIDE 9 ML/HR: 600; 310; 30; 20 INJECTION, SOLUTION INTRAVENOUS at 07:45

## 2022-01-11 RX ADMIN — KETAMINE HYDROCHLORIDE 20 MG: 10 INJECTION INTRAMUSCULAR; INTRAVENOUS at 09:26

## 2022-01-11 RX ADMIN — KETOROLAC TROMETHAMINE 30 MG: 30 INJECTION, SOLUTION INTRAMUSCULAR at 10:16

## 2022-01-11 RX ADMIN — SODIUM CHLORIDE, POTASSIUM CHLORIDE, SODIUM LACTATE AND CALCIUM CHLORIDE: 600; 310; 30; 20 INJECTION, SOLUTION INTRAVENOUS at 10:22

## 2022-01-11 NOTE — CONSULTS
CONSULT NOTE    INTERNAL MEDICINE   [facility]       Patient Identification:  Name: Dilip Verma  Age: 71 y.o.  Sex: male  :  1950  MRN: [id number]             Date of Consultation:  2022          Primary Care Physician: Dakota Avila MD                               Requesting Physician: Jurgen Candelaria MD   Reason for Consultation:DM II    Chief Complaint:  None    History of Present Illness:   Pleasant 71-year-old gentleman admitted for a elective left total hip conversion.  He is now postop.  We are asked to see in regards to diabetic management.  Better diabetic from multiple years now and presently has been managed on insulin alone.  His wife states that he is taking 36 units of insulin glargine daily.  (Not the 2 units noted on med rec).  In addition he has taken an average of 12 units of Humalog before each meal.  Apparently there is some sort of sliding scale that he uses but they are a bit vague as to exactly what this is.  Blood sugar control has been fairly poor with an A1c in excess of 9.  2 Accu-Cheks taken here so far been 175 and 155.      Past Medical History:  Past Medical History:   Diagnosis Date   • AMENA (acute kidney injury) ([facility]) 12/3/2020   • Anxiety    • Balance disorder    • Chronic diastolic (congestive) heart failure ([facility])    • Chronic kidney disease     stones- had lithotripsy   • CKD (chronic kidney disease) stage 3, GFR 30-59 ml/min ([facility]) 2020   • Closed left subtrochanteric femur fracture, with nonunion, subsequent encounter    • Closed nondisplaced intertrochanteric fracture of left femur ([facility]) 2020   • Colon polyp    • Contracture, right hand    • Coronary artery disease     CABG 2019   • Diabetes mellitus, type II ([facility])    • Erectile dysfunction    • H/O bone density study never   • History of sepsis     2020   • Hyperlipidemia    • Hypersomnia    • Hypertension    • Kidney stone    • Orthostasis    • Sleep apnea     STATES DOESN'T USE  "BIPAP OR CPAP   • Spinal stenosis in cervical region     SEVERE-LIMITED ROM   • Stroke ([facility]) 2012    \"slight stroke\"     Past Surgical History:  Past Surgical History:   Procedure Laterality Date   • CARDIAC CATHETERIZATION N/A 7/15/2019    Procedure: Coronary angiography;  Surgeon: Carrie Price MD;  Location: [facility] CATH INVASIVE LOCATION;  Service: Cardiovascular   • CARDIAC CATHETERIZATION N/A 7/15/2019    Procedure: Left Heart Cath;  Surgeon: Carrie Price MD;  Location: [facility] CATH INVASIVE LOC[facility];  Service: Cardiovascular   • CARDIAC CATHETERIZATION N/A 7/15/2019    Procedure: Left ventriculography;  Surgeon: Carrie Price MD;  Location[facility] C[facility]ASIVE LOC[facility];  Service: Cardiovascular   • CARDIAC CATHETERIZATION  7/15/2019    Procedure: Functional Flow [place];  Surgeon: Carrie Price MD;  [facility];  Service: Cardiovascular   • CARDIAC CATHETERIZATION N/A 11/6/2019    Procedure: Right and Left Heart Cath;  Surgeon: Mya Smith MD;  Location: [facility] CATH INVASIVE LOCATION;  Service: Cardiovascular   • CARDIAC CATHETERIZATION N/A 11/6/2019    Procedure: Coronary angiography;  Surgeon: Mya Smith MD;  Location: [facility] CATH INVASIVE LOCATION;  Service: Cardiovascular   • CATARACT EXTRACTION EXTRACAPSULAR W/ INTRAOCULAR LENS IMPLANTATION Bilateral    • COLONOSCOPY  01/2015    dr [email or web address]   • CORONARY ARTERY BYPASS GRAFT N/A 7/18/2019    Procedure: INTRAOPERATIVE SHOAIB; STERNOTOMY CORONARY ARTERY BYPASS x 3  USING LEFT INTERNAL MAMMARY ARTERY GRAFT UTILIZING ENDOSCOPICALLY HARVESTED RIGHT GREATER SAPHENOUS VEIN AND PRP.;  Surgeon: Bill Devi MD;  Location: [facility] MAIN OR;  Service: Cardiothoracic   • DENTAL PROCEDURE      3 surgeries inder implants   • FEMUR IM NAILING/RODDING Left 12/3/2020    Procedure: LEFT HIP INTRAMEDULLARY NAIL;  Surgeon: Niraj Ravi MD;  Location: [facility] MAIN OR;  Service: Orthopedic Spine;  Laterality: Left;   • HERNIA REPAIR  "     inguinal bilaterally   • KIDNEY STONE SURGERY      lithotripsy      Home Meds:  Medications Prior to Admission   Medication Sig Dispense Refill Last Dose   • aspirin 81 MG EC tablet Take 81 mg by mouth Daily. HOLD PRIOR TO SURGERY PER MD INSTRUCTIONS   1/4/2022 at 1100   • bumetanide (BUMEX) 1 MG tablet Take 1 tablet by mouth 2 (Two) Times a Day.   1/10/2022 at 2000   • Chlorhexidine Gluconate Cloth 2 % pads Apply  topically Take As Directed. PRIOR TO SURGERY   1/11/2022 at Unknown time   • Cholecalciferol (Vitamin D) 125 MCG (5000 UT) capsule capsule Take 5,000 Units by mouth Daily.   Past Month at Unknown time   • glucose blood (Accu-Chek Guide) test strip 1 each by Other route 3 (Three) Times a Day.   1/11/2022 at Unknown time   • Insulin Glargine, 2 Unit Dial, (TOUJEO) 300 UNIT/ML solution pen-injector injection Inject 36 Units under the skin into the appropriate area as directed Daily.   1/10/2022 at 1100   • insulin lispro (humaLOG) 100 UNIT/ML injection Inject 0-14 Units under the skin into the appropriate area as directed 3 (Three) Times a Day Before Meals. (Patient taking differently: Inject 0-14 Units under the skin into the appropriate area as directed 3 (Three) Times a Day Before Meals. SLIDING SCALE)  12 1/10/2022 at 2200   • mupirocin (BACTROBAN) 2 % nasal ointment into the nostril(s) as directed by provider Take As Directed. PRIOR TO SURGERY   1/11/2022 at Unknown time   • Oxymetazoline HCl (AFRIN NASAL SPRAY NA) into the nostril(s) as directed by provider.   1/10/2022 at Unknown time   • sennosides-docusate (senna-docusate sodium) 8.6-50 MG per tablet Take 2 tablets by mouth 2 (Two) Times a Day As Needed for Constipation. 56 tablet 0 1/10/2022 at 1700   • sildenafil (REVATIO) 20 MG tablet Take 1-3 tablets 1 hr prior to sexual activity (Patient taking differently: HOLD PRIOR TO SURGERY 48 HOURS) 20 tablet 2 Past Month at Unknown time   • spironolactone (ALDACTONE) 25 MG tablet Take 1 tablet by  mouth Daily.   1/10/2022 at 1400   • testosterone (ANDROGEL) 25 MG/2.5GM (1%) gel gel Place  on the skin as directed by provider Daily.   Past Month at Unknown time   • traMADol (ULTRAM) 50 MG tablet Take 1 tablet by mouth Every 8 (Eight) Hours As Needed for Severe Pain . 40 tablet 3 1/10/2022 at 1800     Current Meds:   [unfilled]  Allergies:  Allergies   Allergen Reactions   • Ace Inhibitors Angioedema   • Seroquel [Quetiapine] Hallucinations   • Amlodipine Swelling   • Ativan [Lorazepam] Hallucinations   • Xanax [Alprazolam] Unknown - High Severity   • Minoxidil Confusion   • Tetracycline Other (See Comments)     bliisters in mouth       Social History:   Social History     Tobacco Use   • Smoking status: Former Smoker     Packs/day: 0.75     Years: 16.00     Pack years: 12.00     Quit date:      Years since quittin.0   • Smokeless tobacco: Never Used   • Tobacco comment: Start age:39/Stopping age:47, 3/4 PPD   Substance Use Topics   • Alcohol use: Not Currently     Comment: alcohol abuse, none x 25 years / caffeine use - 1 cup daily      Family History:  Family History   Problem Relation Age of Onset   • Depression Mother    • Cancer Mother         uterine   • Arrhythmia Mother    • Heart disease Father    • Hypertension Father    • Kidney disease Father    • Thyroid disease Father    • Depression Sister    • Alcohol abuse Brother    • Alcohol abuse Other    • Alcohol abuse Other    • Lung disease Other    • Thyroid disease Other    • Glaucoma Other    • Heart attack Other    • Stroke Maternal Grandmother    • Stroke Paternal Grandmother    • Malig Hyperthermia Neg Hx           Review of Systems  Review of Systems   Constitutional:        His wife notes that he does sleep a great deal of the time.   HENT: Negative.    Eyes: Negative.    Respiratory:        History of obstructive sleep apnea but noncompliant with CPAP.   Cardiovascular:        History of coronary disease, status post CABG as well as history  "of diastolic CHF.  Presently on Bumex and Aldactone.  Denies any shortness of breath, palpitations, syncope, chest pain.  Echocardiogram this last summer did show grade 2 diastolic dysfunction as well as a RV systolic pressure of 70.   Gastrointestinal: Negative.    Endocrine: Negative.    Genitourinary: Negative.    Musculoskeletal:        Now postop total left hip.   Skin: Negative.    Allergic/Immunologic: Negative.    Neurological: Negative.    Hematological: Negative.    Psychiatric/Behavioral: Negative.          Vitals:   /79 (BP Location: Right arm, Patient Position: Lying)   Pulse 89   Temp 97.4 °F (36.3 °C)   Resp 16   Ht 175.3 cm (69\")   Wt 82.1 kg (181 lb 1.6 oz)   SpO2 97%   BMI 26.74 kg/m²   I/O:     Intake/Output Summary (Last 24 hours) at 1/11/2022 1451  Last data filed at 1/11/2022 1331  Gross per 24 hour   Intake 2500 ml   Output 800 ml   Net 1700 ml       Exam:  Physical Exam  Constitutional:       General: He is not in acute distress.     Appearance: Normal appearance. He is not ill-appearing, toxic-appearing or diaphoretic.   HENT:      Head: Normocephalic and atraumatic.      Right Ear: External ear normal.      Left Ear: External ear normal.      Nose: Nose normal.   Eyes:      General: No scleral icterus.        Right eye: No discharge.         Left eye: No discharge.      Extraocular Movements: Extraocular movements intact.      Conjunctiva/sclera: Conjunctivae normal.   Cardiovascular:      Rate and Rhythm: Normal rate and regular rhythm.      Heart sounds: No friction rub. No gallop.       Comments: 1/6 to 2/6 systolic murmur precordially.  Pedal pulses trace bilaterally.  Pulmonary:      Effort: Pulmonary effort is normal.      Breath sounds: Normal breath sounds.   Abdominal:      General: Abdomen is flat. Bowel sounds are normal. There is no distension.      Palpations: Abdomen is soft. There is no mass.      Tenderness: There is no abdominal tenderness. There is no guarding " or rebound.   Musculoskeletal:      Cervical back: Normal range of motion and neck supple.      Comments: Trace pedal edema bilaterally.   Skin:     General: Skin is warm and dry.   Neurological:      Mental Status: He is alert.      Comments: No lateralizing signs.  Alert and oriented x3.   Psychiatric:         Mood and Affect: Mood normal.         Behavior: Behavior normal.         Thought Content: Thought content normal.         Judgment: Judgment normal.         Data Review:  Labs in chart were reviewed.    Assessment:    Status post total replacement of hip    Diabetes type 2: Appears to have been under fairly poor control.  We will resume his insulin glargine as well as a sliding scale.  We will start off with a conservative dose and build up quickly as needed.  Chronic diastolic CHF: Presently appears very close to euvolemic.  Monitor fluid status closely.  Hypoxia: Postop.  During my exam this appears already be improving.  Increase activity.  Incentive spirometry.  Wean oxygen as tolerated.  Suspect a good deal of this associated with his untreated obstructive sleep apnea.  Coronary artery disease: Clinically stable.  Chronic kidney disease stage III: Monitor fluid status closely.  Avoid nephrotoxins.    Hypertension: Presently under reasonable control.  Monitor.  Peripheral vascular disease:  Obstructive sleep apnea: Patient not using CPAP.  Likely significant contributor to the wife's complaint of the patient sleeping a lot during the daytime.  Previous TSH levels have been within normal limits.      Plan:  Please see above.  We certainly appreciate being involved this pleasant gentleman's care and will be happy to follow him along with you.    Nitish Block MD   1/11/2022  14:51 EST    EMR Dragon/Transcription disclaimer:   Much of this encounter note is an electronic transcription/translation of spoken language to printed text. The electronic translation of spoken language may permit erroneous, or  at times, nonsensical words or phrases to be inadvertently transcribed; Although I have reviewed the note for such errors, some may still exist.

## 2022-01-11 NOTE — PLAN OF CARE
Goal Outcome Evaluation:  Plan of Care Reviewed With: patient        Progress: improving  Outcome Summary: A&Ox4. VSS. NVI. Dressing CDI. VIVEK to L hip, acewrap around L knee. Worked with PT, up with assist x2 and walker. Voiding function intact, urinal. Minimal c/o pain this shift. DM with SSI and lantus. Plans to D/C home vs SNF pending progress with PT. Education provided. Will cont to monitor.

## 2022-01-11 NOTE — OP NOTE
Total Hip conversion operative Note  Dr. PRABHAKAR “Aren” Teagan PAIGE  [phone]    PATIENT NAME: Dilip Verma  MRN: [id number]  : 1950 AGE: 71 y.o. GENDER: male  DATE OF OPERATION: 2022  PREOPERATIVE DIAGNOSIS: Ununited subtrochanteric fracture  POSTOPERATIVE DIAGNOSIS: Same  OPERATION PERFORMED: Left Total Hip conversion  SURGEON: Jurgen Candelaria MD  Circulator: Rosa Salgado RN  Scrub Person: Genet Grove  Vendor Representative: Amador Ball; Shayy Fritz  Orderly: Rell Posada  Assistant: Danna Fowler PA  ANESTHESIA: General  ASSISTANT: Daisy Fowler. This case would not have been possible without another set of skilled surgical hands for retraction, bone reduction, use of instrumentation, assistance with implants, and closure.  This assistance was vital to the success of the case.   ESTIMATED BLOOD LOSS: 500cc  SPONGE AND NEEDLE COUNT: Correct  INDICATIONS: This patient was noted to have an ununited subtrochanteric hip fracture. A discussion of operative versus nonoperative treatment was had with the patient. They elected to undergo revision hip arthroplasty. The risks of surgery were discussed and included the risk of anesthesia, infection, damage to neurovascular structures, implant loosening/failure, fracture, hardware prominence, dislocation, the need for further procedures, medical complications, and others. No guarantees were made. The patient wished to proceed with surgery and a surgical consent was signed.  COMPONENTS:   · Accord 2 mm cable with clamp  · 56 mm R3 acetabular shell with 2 screws and dual mobility liner  · Read at femoral standard offset size 18 x 190 with a +0 dual mobility head ball    PERTINENT FINDINGS: Ununited subtrochanteric fracture    DETAILS OF PROCEDURE:   The patient was met in the preoperative area. The site was marked. The consent and H&P were reviewed. The patient was then wheeled back to the operative suite and underwent anesthesia. The patient was  positioned laterally using the pegboard.  An axillary roll was placed under the down arm. Excess hair about the operative hip was removed using surgical clippers. Surgical alcohol was used to thoroughly clean the entire operative extremity.     The hip and leg were then prepped in the normal sterile fashion, which included ChloraPrep, multiple layers of sterile drapes, and surgical space suits for the entire operative team. New outer gloves were used by all sterile surgical team members after final draping. The surgical incision was marked. A surgical timeout was performed in which administration of preoperative antibiotics and tranexamic acid, if not contraindicated, was confirmed.    A standard posterior approach was then utilized.  Dissection was carried down to the hip capsule and a capsulotomy was made.  A dislocation was then performed.  I was unable to identify the lag screw and after a small dissection through the abductor was able to find the tip of the nail.  These were then identified for later usage.  At that point, I turned my attention to the distal interlocking screws.  Through the old incision I was able to identify the more proximal interlocking screw which was not fractured and this was removed easily.  I was able to remove the unbroken half of the broken screw, but the other half remained.  I then removed the lag screw and inserted the extraction device into the proximal aspect of the nail.  Unfortunately, the nail was not coming out easily and I figured that the remaining half of the screw was getting in the way.  I made a counterincision on the contralateral side and I was able to feel the screw but was unable to extract it.  I brought in C arm and was able to push the screw further out through the bone on the opposite side and then remove it.  At that point, I turned my attention to the hip.    The hip was then redislocated and a femoral neck cut was made.  Retractors were placed around the  "acetabulum and the acetabulum was sequentially reamed until there was adequate fill.  Real cup was then opened and inserted.  I did place 2 screws for supplemental fixation.  Real liner was then snapped into place.    I did pass a prophylactic cable around the proximal femur to prevent any fracture propagation from the prior fracture.  This was passed prior to machining the femur with reamers.    I next turned my attention to the femur.  I used a lateralizer to get out a little more lateral and then placed a prophylactic cable around the proximal femur to prevent fracture propagation before reaming down the canal.  The canal was then reamed until there was adequate fill and chatter.  The hip was then trialed and leg lengths were noted to be within acceptable margin and the hip was incredibly stable.  I then opened a real implant and inserted down the bone.  A trial head was utilized 1 final time to ensure stability of the hip, which was great.  The real head ball was then opened assembled and then inserted.  The hip was relocated 1 final time and noted to have excellent stability and leg length.  The wounds were thoroughly irrigated and closed in layers.  A sterile dressing was applied.    The patient was moved from the operative table to the bed. The patient was taken to the recovery room in stable condition. There were no complications and the patient tolerated the procedure well.    R \"Aren\" Teagan PAIGE MD  Orthopaedic Surgery  [facility]  [phone]                  "

## 2022-01-11 NOTE — ANESTHESIA PROCEDURE NOTES
Airway  Urgency: elective    Date/Time: 1/11/2022 8:32 AM  Airway not difficult    General Information and Staff    Patient location during procedure: OR  Anesthesiologist: Pepito Whitehead MD    Indications and Patient Condition  Indications for airway management: airway protection    Preoxygenated: yes  Mask difficulty assessment: 1 - vent by mask    Final Airway Details  Final airway type: endotracheal airway      Successful airway: ETT  Cuffed: yes   Successful intubation technique: video laryngoscopy  Endotracheal tube insertion site: oral  Blade: CMAC  Blade size: D  ETT size (mm): 8.0  Placement verified by: chest auscultation and capnometry   Measured from: lips  ETT/EBT  to lips (cm): 23  Number of attempts at approach: 1  Assessment: lips, teeth, and gum same as pre-op and atraumatic intubation

## 2022-01-11 NOTE — ANESTHESIA POSTPROCEDURE EVALUATION
"Patient: Dilip Verma    Procedure Summary     Date: 01/11/22 Room / Location: [facility] OR [address] [place] [address] MAIN OR    Anesthesia Start: 0806 Anesthesia Stop: 1043    Procedure: TOTAL HIP ARTHROPLASTY REVISION- POSTERIOR (Left Hip) Diagnosis:     Surgeons: Jurgen Candelaria II, MD Provider: Pepito Whitehead MD    Anesthesia Type: general ASA Status: 4          Anesthesia Type: general    Vitals  Vitals Value Taken Time   /64 01/11/22 1211   Temp 36.4 °C (97.6 °F) 01/11/22 1039   Pulse 88 01/11/22 1219   Resp 16 01/11/22 1155   SpO2 91 % 01/11/22 1219   Vitals shown include unvalidated device data.        Post Anesthesia Care and Evaluation    Patient location during evaluation: bedside  Patient participation: complete - patient participated  Level of consciousness: awake and alert  Pain management: adequate  Airway patency: patent  Anesthetic complications: No anesthetic complications    Cardiovascular status: acceptable  Respiratory status: acceptable  Hydration status: acceptable    Comments: /64   Pulse 86   Temp 36.4 °C (97.6 °F) (Oral)   Resp 16   Ht 175.3 cm (69\")   Wt 82.1 kg (181 lb 1.6 oz)   SpO2 93%   BMI 26.74 kg/m²       "

## 2022-01-11 NOTE — ANESTHESIA PROCEDURE NOTES
Arterial Line      Patient reassessed immediately prior to procedure    Patient location during procedure: OR  Start time: 1/11/2022 8:22 AM  Stop Time:1/11/2022 8:30 AM       Line placed for hemodynamic monitoring and ABGs/Labs/ISTAT.  Performed By   Anesthesiologist: Pepito Whitehead MD  Preanesthetic Checklist  Completed: patient identified, IV checked, site marked, risks and benefits discussed, surgical consent, monitors and equipment checked, pre-op evaluation and timeout performed  Arterial Line Prep   Sterile Tech: gloves, mask, cap and sterile barriers  Prep: ChloraPrep  Patient monitoring: blood pressure monitoring, continuous pulse oximetry and EKG  Arterial Line Procedure   Laterality:left  Location:  radial artery  Catheter size: 20 G   Guidance: ultrasound guided and Ultrasound guidance was utlilized and needle placement was visualized under U/S  PROCEDURE NOTE/ULTRASOUND INTERPRETATION.  Using ultrasound guidance the potential vascular sites for insertion of the catheter were visualized to determine the patency of the vessel to be used for vascular access.  After selecting the appropriate site for insertion, the needle was visualized under ultrasound being inserted into the radial artery, followed by ultrasound confirmation of wire and catheter placement. There were no abnormalities seen on ultrasound; an image was taken; and the patient tolerated the procedure with no complications.   Number of attempts: 2  Successful placement: yes  Post Assessment   Dressing Type: occlusive dressing applied, secured with tape and wrist guard applied.   Complications no  Circ/Move/Sens Assessment: normal and unchanged.   Patient Tolerance: patient tolerated the procedure well with no apparent complications

## 2022-01-11 NOTE — H&P
"  Orthopaedic Surgery  History & Physical  Dr. VANNA Candelaria II  [phone]    HPI:  Patient is a 71 y.o. Not  or  male who presents with a prior left hip fracture with an ununited intertrochanteric fracture.  He was indicated for hip conversion surgery and is here today for surgery.    MEDICAL HISTORY  Past Medical History:   Diagnosis Date   • AMENA (acute kidney injury) ([facility]) 12/3/2020   • Anxiety    • Balance disorder    • Chronic diastolic (congestive) heart failure ([facility])    • Chronic kidney disease     stones- had lithotripsy   • CKD (chronic kidney disease) stage 3, GFR 30-59 ml/min ([facility]) 12/1/2020   • Closed left subtrochanteric femur fracture, with nonunion, subsequent encounter    • Closed nondisplaced intertrochanteric fracture of left femur ([facility]) 12/1/2020   • Colon polyp    • Contracture, right hand    • Coronary artery disease     CABG 7/2019   • Diabetes mellitus, type II ([facility])    • Erectile dysfunction    • H/O bone density study never   • History of sepsis     12/2020   • Hyperlipidemia    • Hypersomnia    • Hypertension    • Kidney stone    • Orthostasis    • Sleep apnea     STATES DOESN'T USE BIPAP OR CPAP   • Spinal stenosis in cervical region     SEVERE-LIMITED ROM   • Stroke ([facility]) 2012    \"slight stroke\"   ·   Past Surgical History:   Procedure Laterality Date   • CARDIAC CATHETERIZATION N/A 7/15/2019    Procedure: Coronary angiography;  Surgeon: Carrie Price MD;  Location:  [facility]VASIVE [facility];  Service: Cardiovascular   • CARDIAC CATHETERIZATION N/A 7/15/2019    Procedure: Left Heart Cath;  Surgeon: Carrie Price MD;  Location:  [facility];  Service: Cardiovascular   • CARDIAC CATHETERIZATION N/A 7/15/2019    Procedure: Left ventriculography;  Surgeon: Carrie Price MD;  Location:  [facility];  Service: Cardiovascular   • CARDIAC CATHETERIZATION  7/15/2019    Procedure: Functional Flow [place];  Surgeon: Carrie Price" MD;  [facility];  Service: Cardiovascular   • CARDIAC CATHETERIZATION N/A 11/6/2019    Procedure: Right and Left Heart Cath;  Surgeon: Mya Smith MD;  [facility];  Service: Cardiovascular   • CARDIAC CATHETERIZATION N/A 11/6/2019    Procedure: Coronary angiography;  Surgeon: Mya Smith MD;  [facility];  Service: Cardiovascular   • CATARACT EXTRACTION EXTRACAPSULAR W/ INTRAOCULAR LENS IMPLANTATION Bilateral    • COLONOSCOPY  01/2015    dr [email or web address]   • CORONARY ARTERY BYPASS GRAFT N/A 7/18/2019    Procedure: INTRAOPERATIVE SHOAIB; STERNOTOMY CORONARY ARTERY BYPASS x 3  USING LEFT INTERNAL MAMMARY ARTERY GRAFT UTILIZING ENDOSCOPICALLY HARVESTED RIGHT GREATER SAPHENOUS VEIN AND PRP.;  Surgeon: Bill Devi MD;  Location: [facility] MAIN OR;  Service: Cardiothoracic   • DENTAL PROCEDURE      3 surgeries inder implants   • FEMUR IM NAILING/RODDING Left 12/3/2020    Procedure: LEFT HIP INTRAMEDULLARY NAIL;  Surgeon: Niraj Ravi MD;  Location: [facility] MAIN OR;  Service: Orthopedic Spine;  Laterality: Left;   • HERNIA REPAIR      inguinal bilaterally   • KIDNEY STONE SURGERY      lithotripsy   ·   Prior to Admission medications    Medication Sig Start Date End Date Taking? Authorizing Provider   aspirin 81 MG EC tablet Take 81 mg by mouth Daily. HOLD PRIOR TO SURGERY PER MD INSTRUCTIONS    Provider, MD Heri   bumetanide (BUMEX) 1 MG tablet Take 1 tablet by mouth 2 (Two) Times a Day. 4/23/21   Marian Boateng APRN   Chlorhexidine Gluconate Cloth 2 % pads Apply  topically Take As Directed. PRIOR TO SURGERY    ProviderHeri MD   Cholecalciferol (Vitamin D) 125 MCG (5000 UT) capsule capsule Take 5,000 Units by mouth Daily.    ProviderHeri MD   Insulin Glargine, 2 Unit Dial, (TOUJEO) 300 UNIT/ML solution pen-injector injection Inject 36 Units under the skin into the appropriate area as directed Daily.    Provider  MD Heri   insulin lispro (humaLOG) 100 UNIT/ML injection Inject 0-14 Units under the skin into the appropriate area as directed 3 (Three) Times a Day Before Meals.  Patient taking differently: Inject 0-14 Units under the skin into the appropriate area as directed 3 (Three) Times a Day Before Meals. SLIDING SCALE 12/7/20   Amador Valverde MD   mupirocin (BACTROBAN) 2 % nasal ointment into the nostril(s) as directed by provider Take As Directed. PRIOR TO SURGERY    ProviderHeri MD   Oxymetazoline HCl (AFRIN NASAL SPRAY NA) into the nostril(s) as directed by provider.    ProviderHeri MD   sennosides-docusate (senna-docusate sodium) 8.6-50 MG per tablet Take 2 tablets by mouth 2 (Two) Times a Day As Needed for Constipation. 4/23/21   Marian Boateng APRN   sildenafil (REVATIO) 20 MG tablet Take 1-3 tablets 1 hr prior to sexual activity  Patient taking differently: HOLD PRIOR TO SURGERY 48 HOURS 11/11/21   Dakota Avila MD   spironolactone (ALDACTONE) 25 MG tablet Take 1 tablet by mouth Daily. 4/24/21   Marian Boateng APRN   testosterone (ANDROGEL) 25 MG/2.5GM (1%) gel gel Place  on the skin as directed by provider Daily.    Provider, MD Heri   traMADol (ULTRAM) 50 MG tablet Take 1 tablet by mouth Every 8 (Eight) Hours As Needed for Severe Pain . 12/28/21   Jaquan Otoole MD   ·   Allergies   Allergen Reactions   • Ace Inhibitors Angioedema   • Clonidine Derivatives Swelling and Unknown - High Severity     Passes out   • Losartan Angioedema     Angioneurotic edema   • Seroquel [Quetiapine] Hallucinations   • Amlodipine Swelling   • Ativan [Lorazepam] Irritability   • Xanax [Alprazolam] Unknown - High Severity   • Minoxidil Confusion   • Tetracycline Other (See Comments)     bliisters in mouth     ·   ·   · There is no immunization history on file for this patient.  Social History     Tobacco Use   • Smoking status: Former Smoker     Packs/day: 0.75     Years: 16.00     Pack years:  "12.00     Quit date:      Years since quittin.0   • Smokeless tobacco: Never Used   • Tobacco comment: Start age:39/Stopping age:47, 3/4 PPD   Substance Use Topics   • Alcohol use: No     Comment: caffeine use - 1 cup daily   ·    Social History     Substance and Sexual Activity   Drug Use No   ·     REVIEW OF SYSTEMS:  · Head: negative for headache  · Respiratory: negative for shortness of breath.   · Cardiovascular: negative for chest pain.   · Gastrointestinal: negative abdominal pain.   · Neurological: negative for LOC  · Psychiatric/Behavioral: negative for memory loss.   · All other systems reviewed and are negative    VITALS: There were no vitals taken for this visit. There is no height or weight on file to calculate BMI.    PHYSICAL EXAM:   · CONSTITUTIONAL: A&Ox3, No acute distress  · LUNGS: Equal chest rise, no shortness of air  · CARDIOVASCULAR: palpable peripheral pulses  · SKIN: no skin lesions in the area examined  · LYMPH: no lymphadenopathy in the area examined      RADIOLOGY REVIEW:   No radiology results for the last 7 days    LABS:   Results for the past 24 hours:   Recent Results (from the past 24 hour(s))   POC Glucose Once    Collection Time: 22  6:43 AM    Specimen: Blood   Result Value Ref Range    Glucose 175 (H) 70 - 130 mg/dL       IMPRESSION:  Patient is a 71 y.o. Not  or  male with left hip fracture with nonunion    PLAN:   · Admited to: Jurgen Candelaria II, MD   · Disposition: Surgery today on left hip    R \"Aren\" Teagan PAIGE MD  Orthopaedic Surgery  [facility]  [phone] - [place] Office  [phone] - [place] Office    "

## 2022-01-11 NOTE — ANESTHESIA PREPROCEDURE EVALUATION
Anesthesia Evaluation     Patient summary reviewed and Nursing notes reviewed   NPO Solid Status: > 8 hours  NPO Liquid Status: > 8 hours           Airway   Mallampati: III  TM distance: >3 FB  Neck ROM: limited  Difficult intubation highly probable  Dental - normal exam   (+) poor dentition and implants    Pulmonary     breath sounds clear to auscultation  (+) a smoker Former, sleep apnea,   (-) decreased breath sounds, wheezes  Cardiovascular - normal exam    Rhythm: regular  Rate: normal    (+) hypertension, CAD, CABG, CHF , hyperlipidemia,     ROS comment: Pulmonary HTN  Preserved EF with nonmodifiable risk factors    Neuro/Psych  (+) CVA, psychiatric history Anxiety,     GI/Hepatic/Renal/Endo    (+)   renal disease CRI, diabetes mellitus type 2 poorly controlled using insulin,     Musculoskeletal     Abdominal  - normal exam   Substance History      OB/GYN          Other                        Anesthesia Plan    ASA 4     general   (I discussed with the patient the relevant risks of general anesthesia including, but not limited to, nausea, vomiting, disorientation, post-op delirium, nerve injury, oral/dental injury, awareness, stroke, and death.  I also reviewed any clinically relevant lab and imaging results.)  intravenous induction     Anesthetic plan, all risks, benefits, and alternatives have been provided, discussed and informed consent has been obtained with: patient.

## 2022-01-11 NOTE — THERAPY EVALUATION
"Patient Name: Dilip Verma  : 1950    MRN: [id number]                              Today's Date: 2022       Admit Date: 2022    Visit Dx: No diagnosis found.  Patient Active Problem List   Diagnosis   • Anxiety   • Colon polyp   • Type 2 diabetes mellitus with hyperglycemia, with long-term current use of insulin ([facility])   • Erectile dysfunction   • Hyperlipidemia   • CAD (coronary artery disease)   • Hx of CABG   • Acute on chronic diastolic CHF (congestive heart failure) ([facility])   • Hypersomnia due to medical condition   • CSA (central sleep apnea)   • Periodic breathing   • HTN (hypertension)   • History of stroke   • CKD (chronic kidney disease) stage 3, GFR 30-59 ml/min ([facility])   • Urinary retention   • Acute on chronic renal failure ([facility])   • Acute on chronic diastolic (congestive) heart failure ([facility])   • Bacteriuria   • Rectal pain   • Status post total replacement of hip     Past Medical History:   Diagnosis Date   • AMENA (acute kidney injury) ([facility]) 12/3/2020   • Anxiety    • Balance disorder    • Chronic diastolic (congestive) heart failure ([facility])    • Chronic kidney disease     stones- had lithotripsy   • CKD (chronic kidney disease) stage 3, GFR 30-59 ml/min ([facility]) 2020   • Closed left subtrochanteric femur fracture, with nonunion, subsequent encounter    • Closed nondisplaced intertrochanteric fracture of left femur ([facility]) 2020   • Colon polyp    • Contracture, right hand    • Coronary artery disease     CABG 2019   • Diabetes mellitus, type II ([facility])    • Erectile dysfunction    • H/O bone density study never   • History of sepsis     2020   • Hyperlipidemia    • Hypersomnia    • Hypertension    • Kidney stone    • Orthostasis    • Sleep apnea     STATES DOESN'T USE BIPAP OR CPAP   • Spinal stenosis in cervical region     SEVERE-LIMITED ROM   • Stroke ([facility])     \"slight stroke\"     Past Surgical History:   Procedure Laterality Date   • CARDIAC CATHETERIZATION N/A 7/15/2019    " Procedure: Coronary angiography;  Surgeon: Carrie Price MD;  Location: [facility] CATH [facility]VASIVE LOCATION;  Service: Cardiovascular   • CARDIAC CATHETERIZATION N/A 7/15/2019    Procedure: Left Heart Cath;  Surgeon: Carrie Price MD;  Location: [facility] CATH [facility] LOCATION;  Service: Cardiovascular   • CARDIAC CATHETERIZATION N/A 7/15/2019    Procedure: Left ventriculography;  Surgeon: Carrie Price MD;  Location: [facility] CATH [facility]ASIVE LOCATION;  Service: Cardiovascular   • CARDIAC CATHETERIZATION  7/15/2019    Procedure: Functional Flow [place];  Surgeon: Carrie Price MD;  Location: [facility] CATH [facility]AS[facility] LOCATION;  Service: Cardiovascular   • CARDIAC CATHETERIZATION N/A 11/6/2019    Procedure: Right and Left Heart Cath;  Surgeon: Mya Smith MD;  Location: [facility] C[facility] LOC[facility];  Service: Cardiovascular   • CARDIAC CATHETERIZATION N/A 11/6/2019    Procedure: Coronary angiography;  Surgeon: Mya Smith MD;  Location: [facility] CATH INVASIVE LOCATION;  Service: Cardiovascular   • CATARACT EXTRACTION EXTRACAPSULAR W/ INTRAOCULAR LENS IMPLANTATION Bilateral    • COLONOSCOPY  01/2015    dr [email or web address]   • CORONARY ARTERY BYPASS GRAFT N/A 7/18/2019    Procedure: INTRAOPERATIVE SHOAIB; STERNOTOMY CORONARY ARTERY BYPASS x 3  USING LEFT INTERNAL MAMMARY ARTERY GRAFT UTILIZING ENDOSCOPICALLY HARVESTED RIGHT GREATER SAPHENOUS VEIN AND PRP.;  Surgeon: Bill Devi MD;  Location: [facility];  Service: Cardiothoracic   • DENTAL PROCEDURE      3 surgeries inder implants   • FEMUR IM NAILING/RODDING Left 12/3/2020    Procedure: LEFT HIP INTRAMEDULLARY NAIL;  Surgeon: Niraj Ravi MD;  Location: [facility] OR;  Service: Orthopedic Spine;  Laterality: Left;   • HERNIA REPAIR      inguinal bilaterally   • KIDNEY STONE SURGERY      lithotripsy      General Information     Row Name 01/11/22 8713          Physical Therapy Time and Intention    Document Type evaluation  -EJ     Mode of Treatment physical therapy  -EJ      [facility] Name 01/11/22 1609          General Information    Patient Profile Reviewed yes  -EJ     Prior Level of Function min assist:; independent:; ADL's; all household mobility  uses rollator, wife assist with ADLs at times  -EJ     Existing Precautions/Restrictions fall  -EJ     Barriers to Rehab medically complex; previous functional deficit  -[facility] Name 01/11/22 1609          Living Environment    Lives With spouse  -[facility] Name 01/11/22 1609          Home Main Entrance    Number of Stairs, Main Entrance none  -[facility] Name 01/11/22 1609          Stairs Within Home, Primary    Number of Stairs, Within Home, Primary none  -EJ     [facility] Name 01/11/22 1609          Cognition    Orientation Status (Cognition) oriented x 3  -[facility] Name 01/11/22 1609          Safety Issues, Functional Mobility    Impairments Affecting Function (Mobility) pain; strength; range of motion (ROM); postural/trunk control; balance  -EJ           User Key  (r) = Recorded By, (t) = Taken By, (c) = Cosigned By    Initials Name Provider Type    EJ Karen Calix, PT Physical Therapist               Mobility     [facility] Name 01/11/22 1610          Bed Mobility    Bed Mobility supine-sit  -EJ     Supine-Sit [place] (Bed Mobility) verbal cues; moderate assist (50% patient effort); 2 person assist  -EJ     Assistive Device (Bed Mobility) head of bed elevated; bed rails  -[facility] Name 01/11/22 1610          Bed-Chair Transfer    Bed-Chair [place] (Transfers) verbal cues; moderate assist (50% patient effort); 2 person assist  -     Assistive Device (Bed-Chair Transfers) walker, front-wheeled  -[facility] Name 01/11/22 1610          Sit-Stand Transfer    Sit-Stand [place] (Transfers) verbal cues; moderate assist (50% patient effort); 2 person assist  -     Assistive Device (Sit-Stand Transfers) walker, front-wheeled  -[facility] Name 01/11/22 1610          Gait/Stairs (Locomotion)    [place] Level (Gait) verbal  cues; moderate assist (50% patient effort); 2 person assist  -EJ     Assistive Device (Gait) walker, front-wheeled  -EJ     Distance in Feet (Gait) 2  -EJ     Deviations/Abnormal Patterns (Gait) farhana decreased; antalgic; stride length decreased  -EJ     Bilateral Gait Deviations forward flexed posture; heel strike decreased  -EJ     Comment (Gait/Stairs) several small steps from bed to chair, limited by weakness, pain  -EJ     Row Name 01/11/22 1610          Mobility    Extremity Weight-bearing Status left lower extremity  -EJ     Left Lower Extremity (Weight-bearing Status) weight-bearing as tolerated (WBAT)  -EJ           User Key  (r) = Recorded By, (t) = Taken By, (c) = Cosigned By    Initials Name Provider Type    Karen Rodriguez, PT Physical Therapist               Obj/Interventions     Row Name 01/11/22 1611          Range of Motion Comprehensive    Comment, General Range of Motion pt reports previous L foot drop  -EJ     Row Name 01/11/22 1611          Strength Comprehensive (MMT)    Comment, General Manual Muscle Testing (MMT) Assessment generalized weakness, increased post op weakness  -EJ     Row Name 01/11/22 1611          Motor Skills    Therapeutic Exercise --  attempted L THR exercises with assist x 10 reps  -EJ     Row Name 01/11/22 1611          Balance    Balance Assessment sitting static balance; standing static balance; standing dynamic balance  -EJ     Static Sitting Balance WFL  -EJ     Static Standing Balance mild impairment; moderate impairment; supported  -EJ     Dynamic Standing Balance moderate impairment; supported  -EJ           User Key  (r) = Recorded By, (t) = Taken By, (c) = Cosigned By    Initials Name Provider Type    Karen Rodriguez, PT Physical Therapist               Goals/Plan     Row Name 01/11/22 1615          Bed Mobility Goal 1 (PT)    Activity/Assistive Device (Bed Mobility Goal 1, PT) bed mobility activities, all  -EJ     [place] Level/Cues Needed (Bed  Mobility Goal 1, PT) contact guard assist  -EJ     Time Frame (Bed Mobility Goal 1, PT) 1 week  -EJ     Row Name 01/11/22 1615          Transfer Goal 1 (PT)    Activity/Assistive Device (Transfer Goal 1, PT) transfers, all; sit-to-stand/stand-to-sit; bed-to-chair/chair-to-bed; walker, rolling  -EJ     [place] Level/Cues Needed (Transfer Goal 1, PT) contact guard assist; minimum assist (75% or more patient effort)  -EJ     Time Frame (Transfer Goal 1, PT) 1 week  -EJ     Row Name 01/11/22 1615          Gait Training Goal 1 (PT)    Activity/Assistive Device (Gait Training Goal 1, PT) gait (walking locomotion); walker, rolling  -EJ     [place] Level (Gait Training Goal 1, PT) contact guard assist  -EJ     Distance (Gait Training Goal 1, PT) 50  -EJ     Time Frame (Gait Training Goal 1, PT) 1 week  -EJ           User Key  (r) = Recorded By, (t) = Taken By, (c) = Cosigned By    Initials Name Provider Type    EJ Karen Calix, PT Physical Therapist               Clinical Impression     Row Name 01/11/22 1612          Pain    Additional Documentation Pain Scale: Numbers Pre/Post-Treatment (Group)  -EJ     Row Name 01/11/22 1612          Pain Scale: Numbers Pre/Post-Treatment    Pretreatment Pain Rating 0/10 - no pain  -EJ     Posttreatment Pain Rating 6/10  -EJ     Pain Location - Side Left  -EJ     Pain Location hip  -EJ     Pain Intervention(s) Repositioned; Ambulation/increased activity  -EJ     Row Name 01/11/22 1612          Plan of Care Review    Plan of Care Reviewed With patient; spouse  -EJ     Progress improving  -EJ     Outcome Summary Pt seen for PT betty this afternoon. He is s/p L hip conversion and presents with post op pain, weakness, and decreased functiona lmobility. Pt w complex PMH including previous stroke w prior L hip fracture and L intertrochanteric fracture. Pt lives at home with his wife and uses rollator for mobility. He is hopeful to go home at [place]. Pt currently requiring mod A x 2  for all mobility. He was able to stand and take several steps to chair. He is fairly weak. Hopefully pt will be able to go home at [place], but may need SNU pending progress w therapy?? He will continue to benefit from skilled PT to maximize safety, strength, and independence w mobility.  -     Row Name 01/11/22 1612          Therapy Assessment/Plan (PT)    Patient/Family Therapy Goals Statement (PT) go home  -EJ     Rehab Potential (PT) good, to achieve stated therapy goals  -EJ     Criteria for Skilled Interventions Met (PT) yes  -EJ     Row Name 01/11/22 1612          Positioning and Restraints    Pre-Treatment Position in bed  -EJ     Post Treatment Position chair  -EJ     In Chair notified nsg; reclined; call light within reach; encouraged to call for assist; exit alarm on; with family/caregiver  -EJ           User Key  (r) = Recorded By, (t) = Taken By, (c) = Cosigned By    Initials Name Provider Type    Karen Rodriguez PT Physical Therapist               Outcome Measures     Row Name 01/11/22 1616          How much help from another person do you currently need...    Turning from your back to your side while in flat bed without using bedrails? 2  -EJ     Moving from lying on back to sitting on the side of a flat bed without bedrails? 2  -EJ     Moving to and from a bed to a chair (including a wheelchair)? 2  -EJ     Standing up from a chair using your arms (e.g., wheelchair, bedside chair)? 2  -EJ     Climbing 3-5 steps with a railing? 1  -EJ     To walk in hospital room? 2  -EJ     AM-PAC 6 Clicks Score (PT) 11  -EJ     Row Name 01/11/22 1616          Functional Assessment    Outcome Measure Options AM-PAC 6 Clicks Basic Mobility (PT)  -EJ           User Key  (r) = Recorded By, (t) = Taken By, (c) = Cosigned By    Initials Name Provider Type    Karen Rodriguez PT Physical Therapist                             Physical Therapy Education                 Title: PT OT SLP Therapies (Done)     Topic:  Physical Therapy (Done)     Point: Mobility training (Done)     Learning Progress Summary           Patient Acceptance, E,TB,D, VU,NR by  at 1/11/2022 1616   Family Acceptance, E,TB,D, VU,NR by  at 1/11/2022 1616                   Point: Home exercise program (Done)     Learning Progress Summary           Patient Acceptance, E,TB,D, VU,NR by EJ at 1/11/2022 1616   Family Acceptance, E,TB,D, VU,NR by EJ at 1/11/2022 1616                   Point: Body mechanics (Done)     Learning Progress Summary           Patient Acceptance, E,TB,D, VU,NR by EJ at 1/11/2022 1616   Family Acceptance, E,TB,D, VU,NR by EJ at 1/11/2022 1616                   Point: Precautions (Done)     Learning Progress Summary           Patient Acceptance, E,TB,D, VU,NR by  at 1/11/2022 1616   Family Acceptance, E,TB,D, VU,NR by EJ at 1/11/2022 1616                               User Key     Initials Effective Dates Name Provider Type Discipline     06/16/21 -  Karen Calix, PT Physical Therapist PT              PT Recommendation and Plan  Planned Therapy Interventions (PT): balance training, bed mobility training, gait training, home exercise program, patient/family education, strengthening, ROM (range of motion), transfer training  Plan of Care Reviewed With: patient, spouse  Progress: improving  Outcome Summary: Pt seen for PT eval this afternoon. He is s/p L hip conversion and presents with post op pain, weakness, and decreased functiona lmobility. Pt w complex PMH including previous stroke w prior L hip fracture and L intertrochanteric fracture. Pt lives at home with his wife and uses rollator for mobility. He is hopeful to go home at [place]. Pt currently requiring mod A x 2 for all mobility. He was able to stand and take several steps to chair. He is fairly weak. Hopefully pt will be able to go home at [place], but may need SNU pending progress w therapy?? He will continue to benefit from skilled PT to maximize safety, strength, and  independence w mobility.     Time Calculation:    PT Charges     Row Name 01/11/22 1617             Time Calculation    Start Time 1545  -EJ      Stop Time 1606  -EJ      Time Calculation (min) 21 min  -EJ      PT Received On 01/11/22  -EJ      PT - Next Appointment 01/12/22  -EJ      PT Goal Re-Cert Due Date 01/18/22  -EJ              Time Calculation- PT    Total Timed Code Minutes- PT 16 minute(s)  -EJ            User Key  (r) = Recorded By, (t) = Taken By, (c) = Cosigned By    Initials Name Provider Type    EJ Karen Calix, PT Physical Therapist              Therapy Charges for Today     Code Description Service Date Service Provider Modifiers Qty    17920876446 HC PT EVAL MOD COMPLEXITY 2 1/11/2022 Karen Calix, PT GP 1    53289092328 HC PT THER PROC EA 15 MIN 1/11/2022 Karen Calix, PT GP 1    53491649413 HC PT THER SUPP EA 15 MIN 1/11/2022 Karen Calix, PT GP 1          PT G-Codes  Outcome Measure Options: AM-PAC 6 Clicks Basic Mobility (PT)  AM-PAC 6 Clicks Score (PT): 11    Karen Calix, PT  1/11/2022

## 2022-01-11 NOTE — PLAN OF CARE
Goal Outcome Evaluation:  Plan of Care Reviewed With: patient, spouse        Progress: improving  Outcome Summary: Pt seen for PT eval this afternoon. He is s/p L hip conversion and presents with post op pain, weakness, and decreased functiona lmobility. Pt w complex PMH including previous stroke w prior L hip fracture and L intertrochanteric fracture. Pt lives at home with his wife and uses rollator for mobility. He is hopeful to go home at [place]. Pt currently requiring mod A x 2 for all mobility. He was able to stand and take several steps to chair. He is fairly weak. Hopefully pt will be able to go home at [place], but may need SNU pending progress w therapy?? He will continue to benefit from skilled PT to maximize safety, strength, and independence w mobility.

## 2022-01-12 LAB
ANION GAP SERPL CALCULATED.3IONS-SCNC: 8.4 MMOL/L (ref 5–15)
BUN SERPL-MCNC: 40 MG/DL (ref 8–23)
BUN/CREAT SERPL: 28.8 (ref 7–25)
CALCIUM SPEC-SCNC: 8.1 MG/DL (ref 8.6–10.5)
CHLORIDE SERPL-SCNC: 102 MMOL/L (ref 98–107)
CO2 SERPL-SCNC: 28.6 MMOL/L (ref 22–29)
CREAT SERPL-MCNC: 1.39 MG/DL (ref 0.76–1.27)
GFR SERPL CREATININE-BSD FRML MDRD: 50 ML/MIN/1.73
GLUCOSE BLDC GLUCOMTR-MCNC: 130 MG/DL (ref 70–130)
GLUCOSE BLDC GLUCOMTR-MCNC: 135 MG/DL (ref 70–130)
GLUCOSE BLDC GLUCOMTR-MCNC: 168 MG/DL (ref 70–130)
GLUCOSE BLDC GLUCOMTR-MCNC: 217 MG/DL (ref 70–130)
GLUCOSE SERPL-MCNC: 161 MG/DL (ref 65–99)
HCT VFR BLD AUTO: 32.5 % (ref 37.5–51)
HGB BLD-MCNC: 11.3 G/DL (ref 13–17.7)
POTASSIUM SERPL-SCNC: 4 MMOL/L (ref 3.5–5.2)
SODIUM SERPL-SCNC: 139 MMOL/L (ref 136–145)
TSH SERPL DL<=0.05 MIU/L-ACNC: 0.39 UIU/ML (ref 0.27–4.2)

## 2022-01-12 PROCEDURE — 84443 ASSAY THYROID STIM HORMONE: CPT | Performed by: HOSPITALIST

## 2022-01-12 PROCEDURE — 82962 GLUCOSE BLOOD TEST: CPT

## 2022-01-12 PROCEDURE — 63710000001 INSULIN LISPRO (HUMAN) PER 5 UNITS: Performed by: NURSE PRACTITIONER

## 2022-01-12 PROCEDURE — 85014 HEMATOCRIT: CPT | Performed by: ORTHOPAEDIC SURGERY

## 2022-01-12 PROCEDURE — 85018 HEMOGLOBIN: CPT | Performed by: ORTHOPAEDIC SURGERY

## 2022-01-12 PROCEDURE — G0378 HOSPITAL OBSERVATION PER HR: HCPCS

## 2022-01-12 PROCEDURE — 0 CEFAZOLIN IN DEXTROSE 2-4 GM/100ML-% SOLUTION: Performed by: ORTHOPAEDIC SURGERY

## 2022-01-12 PROCEDURE — 97116 GAIT TRAINING THERAPY: CPT

## 2022-01-12 PROCEDURE — 97530 THERAPEUTIC ACTIVITIES: CPT

## 2022-01-12 PROCEDURE — 80048 BASIC METABOLIC PNL TOTAL CA: CPT | Performed by: ORTHOPAEDIC SURGERY

## 2022-01-12 RX ORDER — BUMETANIDE 1 MG/1
1 TABLET ORAL 2 TIMES DAILY
Status: DISCONTINUED | OUTPATIENT
Start: 2022-01-12 | End: 2022-01-13 | Stop reason: HOSPADM

## 2022-01-12 RX ADMIN — HYDROCODONE BITARTRATE AND ACETAMINOPHEN 1 TABLET: 5; 325 TABLET ORAL at 00:21

## 2022-01-12 RX ADMIN — FAMOTIDINE 40 MG: 20 TABLET, FILM COATED ORAL at 08:12

## 2022-01-12 RX ADMIN — INSULIN LISPRO 2 UNITS: 100 INJECTION, SOLUTION INTRAVENOUS; SUBCUTANEOUS at 08:12

## 2022-01-12 RX ADMIN — HYDROCODONE BITARTRATE AND ACETAMINOPHEN 2 TABLET: 5; 325 TABLET ORAL at 04:27

## 2022-01-12 RX ADMIN — INSULIN GLARGINE-YFGN 30 UNITS: 100 INJECTION, SOLUTION SUBCUTANEOUS at 21:22

## 2022-01-12 RX ADMIN — INSULIN LISPRO 4 UNITS: 100 INJECTION, SOLUTION INTRAVENOUS; SUBCUTANEOUS at 17:00

## 2022-01-12 RX ADMIN — ASPIRIN 81 MG: 81 TABLET, COATED ORAL at 08:12

## 2022-01-12 RX ADMIN — HYDROCODONE BITARTRATE AND ACETAMINOPHEN 1 TABLET: 5; 325 TABLET ORAL at 18:44

## 2022-01-12 RX ADMIN — MELOXICAM 15 MG: 15 TABLET ORAL at 08:12

## 2022-01-12 RX ADMIN — HYDROCODONE BITARTRATE AND ACETAMINOPHEN 1 TABLET: 5; 325 TABLET ORAL at 23:42

## 2022-01-12 RX ADMIN — HYDROCODONE BITARTRATE AND ACETAMINOPHEN 2 TABLET: 5; 325 TABLET ORAL at 12:19

## 2022-01-12 RX ADMIN — HYDROCODONE BITARTRATE AND ACETAMINOPHEN 1 TABLET: 5; 325 TABLET ORAL at 08:12

## 2022-01-12 RX ADMIN — SPIRONOLACTONE 25 MG: 25 TABLET ORAL at 08:12

## 2022-01-12 RX ADMIN — BUMETANIDE 1 MG: 1 TABLET ORAL at 21:31

## 2022-01-12 RX ADMIN — CEFAZOLIN SODIUM 2 G: 2 INJECTION, SOLUTION INTRAVENOUS at 00:18

## 2022-01-12 NOTE — CASE MANAGEMENT/SOCIAL WORK
Continued Stay Note  [facility]     Patient Name: Dilip Verma  MRN: [id number]  Today's Date: 1/12/2022    Admit Date: 1/11/2022     Discharge Plan     Row Name 01/12/22 1704       Plan    Plan Home with family support & [facility].    Provided Post Acute Provider List? Yes    Post Acute Provider List Nursing Home  SNF.    Provided Post Acute Provider Quality & Resource List? Yes    Post Acute Provider Quality and Resource List Nursing Home  SNF.    Delivered To Patient; Support Person    Support Person Wife/Beny.    Method of Delivery In person    Patient/Family in Agreement with Plan yes    Plan Comments Physical Therapy eval/rec noted. Spoke with both the patient and his wife/Beny about the possible benefits of SNF after d/c. Patient and Beny deny the need for SNF stating they only want to d/c home with HH. CMS Compare SNF List was left with the patient at the bedside for his review in the event he changes his mind.               Discharge Codes    No documentation.               Expected Discharge Date and Time     Expected Discharge Date Expected Discharge Time    Jan 13, 2022             Maru Francisco RN

## 2022-01-12 NOTE — PROGRESS NOTES
[facility]    PROGRESS NOTE    Name:  Dilip Verma   Age:  71 y.o.  Sex:  male  :  1950  MRN:  [id number]   Visit Number:  [id number]  Admission Date:  2022  Date Of Service:  22  Primary Care Physician:  Dakota Avila MD     LOS: 0 days :  Patient Care Team:  Dakota Avila MD as PCP - General (Family Medicine)  Morris Cai MD as Consulting Physician (Sleep Medicine)  Michaela Hylton MD as Consulting Physician (Cardiology)  Hardy Banerjee MD as Consulting Physician (Ophthalmology)  Chula Christianson MD as Consulting Physician (Ophthalmology)  Jason Sherman MD (Endocrinology)  Perla Mayen MD as Consulting Physician (Nephrology)  Federico Hebert MD as Consulting Physician (Pulmonary Disease)  Niraj Ravi MD as Consulting Physician (Orthopedic Surgery)  Papa Velasco MD as Consulting Physician (Urology):    History taken from:     patient chart    Chief Complaint:      Hyperglycemia    Subjective     Interval History:     Patient seen and examined today.  Reviewed history and physical, lab work, x-rays, chart.  Reviewed consult as well.    [facility] consulted for medical issues.  Long-acting insulin was added back in the form of Lantus with reduction in his blood sugars.  Continue sliding scale.  Patient is on 12 units 3 times daily at home, will not institute that here.  Also recommended that he follow-up with endocrinology, as his hemoglobin A1c is elevated at 9.06.    Patient noted to have hypoxia in the room.  Encourage incentive spirometry and his O2 saturations came into the upper 90s.  Suspect he has sleep apnea, though he is not compliant with CPAP.  Recommend follow-up with his PCP as an outpatient regarding this.  Encourage use of CPAP.    Urine shows too numerous to count WBCs but trace bacteria.  Patient is asymptomatic with this.  We will treat only if symptomatic.    Orthopedic surgery is following, per the  patient, he is to be discharged tomorrow.    Review of Systems:     All systems were reviewed and negative except for:  Musculoskeletal: positive for  Hip pain  Endocrine: positive for  Hyperglycemia    Objective     Vital Signs:    Temp:  [97 °F (36.1 °C)-98.5 °F (36.9 °C)] 98.5 °F (36.9 °C)  Heart Rate:  [72-91] 82  Resp:  [16-18] 18  BP: (103-154)/(59-88) 120/65    Physical Exam:    General: Alert and oriented x4, mild distress.  Heart: Regular rate and rhythm without murmur rub or thrill.  Lungs: Clear to auscultation bilaterally without use of accessory muscles respiration.  Abdomen: Soft/nontender/nondistended.  No HSM noted.  MSK: 4/5 strength in upper/lower extremities bilaterally.     Results Review:      I reviewed the patient's new clinical results.  I reviewed the patient's new imaging results and agree with the interpretation.  I reviewed the patient's other test results and agree with the interpretation    Labs:    Lab Results (last 24 hours)     Procedure Component Value Units Date/Time    POC Glucose Once [[id number]]  (Abnormal) Collected: 01/12/22 1644    Specimen: Blood Updated: 01/12/22 1646     Glucose 217 mg/dL      Comment: Meter: [id number] : [id number] [employer] PCA       POC Glucose Once [[id number]]  (Normal) Collected: 01/12/22 1125    Specimen: Blood Updated: 01/12/22 1127     Glucose 130 mg/dL      Comment: Meter: [id number] : [id number] [employer] PCA       TSH [[id number]]  (Normal) Collected: 01/12/22 0741    Specimen: Blood Updated: 01/12/22 0911     TSH 0.394 uIU/mL     Basic Metabolic Panel [[id number]]  (Abnormal) Collected: 01/12/22 0741    Specimen: Blood Updated: 01/12/22 0905     Glucose 161 mg/dL      BUN 40 mg/dL      Creatinine 1.39 mg/dL      Sodium 139 mmol/L      Potassium 4.0 mmol/L      Chloride 102 mmol/L      CO2 28.6 mmol/L      Calcium 8.1 mg/dL      eGFR Non African Amer 50 mL/min/1.73      BUN/Creatinine Ratio 28.8     Anion Gap 8.4 mmol/L      Narrative:      GFR Normal >60  Chronic Kidney Disease <60  Kidney Failure <15      Hemoglobin & Hematocrit, Blood [[id number]]  (Abnormal) Collected: 01/12/22 0741    Specimen: Blood Updated: 01/12/22 0853     Hemoglobin 11.3 g/dL      Hematocrit 32.5 %     POC Glucose Once [[id number]]  (Abnormal) Collected: 01/12/22 0627    Specimen: Blood Updated: 01/12/22 0629     Glucose 168 mg/dL      Comment: Meter: [id number] : [id number] [employer] NA       POC Glucose Once [[id number]]  (Abnormal) Collected: 01/11/22 2105    Specimen: Blood Updated: 01/11/22 2116     Glucose 375 mg/dL      Comment: Meter: [id number] : [id number] [employer] NA              Radiology:    Imaging Results (Last 24 Hours)     ** No results found for the last 24 hours. **          Medication Review:     aspirin, 81 mg, Oral, Daily  famotidine, 40 mg, Oral, Daily  insulin glargine, 30 Units, Subcutaneous, Nightly  insulin lispro, 0-9 Units, Subcutaneous, 4x Daily With Meals & Nightly  meloxicam, 15 mg, Oral, Daily  spironolactone, 25 mg, Oral, Daily        lactated ringers, 9 mL/hr, Last Rate: Stopped (01/11/22 1349)  sodium chloride, 100 mL/hr, Last Rate: 100 mL/hr (01/11/22 2129)        Assessment/Plan     Active Problems:    Status post total replacement of hip      Diabetes type 2  Chronic diastolic CHF:  Euvolemic  Hypoxia:  Encourage incentive spirometry, this resolves when he uses it.  Coronary artery disease  Chronic kidney disease stage III: Mild renal insufficiency along with this.  Responded to IV fluids.  Hypertension:  Monitor  Peripheral vascular disease:  Obstructive sleep apnea: Patient not using CPAP.  Was encouraged to follow-up as an outpatient regarding this.       Plan:    Monitor blood sugars.  Continue with current regimen.  Continue with sliding scale.  Patient may resume current regimen at home, but did encourage the patient to go to endocrinology as his hemoglobin A1c is  9.06.  No objection to the  patient being discharged tomorrow.  Edward Singh DO  01/12/22  17:07 EST

## 2022-01-12 NOTE — PROGRESS NOTES
"Labs still not back yet today.  Pain reasonably well controlled.  Hansel with some mild drainage, knee bandage clean dry and intact.  Likely this patient will require SNF, although he does want to go home.  We will see how he progresses with physical therapy but unless he passes this morning we will go ahead and obtain precertification.    R \"Aren\" Teagan PAIGE MD  Orthopaedic Surgery  [facility]  [phone] - [facility]  [phone] - [place] [facility]    "

## 2022-01-12 NOTE — CASE MANAGEMENT/SOCIAL WORK
Discharge Planning Assessment  [facility]     Patient Name: Dilip Verma  MRN: [id number]  Today's Date: 1/12/2022    Admit Date: 1/11/2022     Discharge Needs Assessment    No documentation.                Discharge Plan     Row Name 01/12/22 1025       Plan    Plan Home with family support & Merged with [facility] vs SNF.    Patient/Family in Agreement with Plan yes    Plan Comments Spoke with both the patient and his wife/Beny, verified current information and explained the role of the CCP. Patient lives with Beny and has family support. He plans to d/c home with family support & HH. Careplan received from [facility] which plans for the patient to d/c home with [facility] . Discussed with the patient and Beny who are both agreeable. Referral sent in Epic. Possible need for SNF - Physical Therapy eval/rec today is pending. TANNER Letter given. CCP will follow.              Continued Care and Services - Admitted Since 1/11/2022     Home Medical Care     Service Provider Request Status Selected Services Address Phone Fax Patient Preferred     [facility]  Pending - Request Sent N/A [address] [address] [phone] --              Expected Discharge Date and Time     Expected Discharge Date Expected Discharge Time    Jan 12, 2022          Patient Forms     Row Name 01/12/22 1026       Patient Forms    Patient Observation Letter Delivered    Delivered to Patient    Method of delivery In person                  Maru Francisco RN

## 2022-01-12 NOTE — DISCHARGE PLACEMENT REQUEST
"Dilip Reilly (71 y.o. Male)             Date of Birth Social Security Number Address Home Phone MRN    1950  [id number] [facility] [address] [facility] [phone] [id number]    Christian Marital Status             Mosque        Admission Date Admission Type Admitting Provider Attending Provider Department, Room/Bed    1/11/22 Elective Jurgen Candelaria II, MD Sweet, Richard Alexander II, MD [address], P787/1    Discharge Date Discharge Disposition Discharge Destination                         Attending Provider: Jurgen Candelaria II, MD    Allergies: Ace Inhibitors, Seroquel [Quetiapine], Amlodipine, Ativan [Lorazepam], Xanax [Alprazolam], Minoxidil, Tetracycline    Isolation: None   Infection: None   Code Status: Prior   Advance Care Planning Activity    Ht: 175.3 cm (69\")   Wt: 82.1 kg (181 lb 1.6 oz)    Admission Cmt: None   Principal Problem: None                Active Insurance as of 1/11/2022     Primary Coverage     Payor Plan Insurance Group Employer/Plan Group    [facility] MEDICARE REPLACEMENT [facility] MEDICARE REPLACEMENT [id number]     Payor Plan Address Payor Plan Phone Number Payor Plan Fax Number Effective Dates    [address] [address]   1/1/2015 - None Entered    [facility] [id number]       Subscriber Name Subscriber Birth Date Member ID       DILIP REILLY 1950 [id number]                 Emergency Contacts      (Rel.) Home Phone Work Phone Mobile Phone    TommieBeny (Spouse) [phone] -- [phone]    TOMMIE SILVIA (Son) [phone] -- [phone]              "

## 2022-01-12 NOTE — PLAN OF CARE
Goal Outcome Evaluation:  Plan of Care Reviewed With: patient        Progress: improving  Outcome Summary: Pt tolerated treatment with c/o left hip pain and increased with ambulation. Pt required Anders with supine to sit needing assistance with left LE. Pt is MinAX2/ModA with sit<->stand transfers. Pt ambulated 10ft with rwx, MinX2. Pt is unsteady but no LOB and fatigues quickly due to increasing pain of left hip. Discussed with pt PT's recommendation for pt to go to SNF to improve strength/endurance and balance. Pt has some reservations of going back to a SNF do to a horrible experiences. Encouraged pt to discuss with wife about SNF vs, home as pt would be a falls risk going home at this time.    ..Patient was wearing a face mask during this therapy encounter. Therapist used appropriate personal protective equipment including eye protection, mask, and gloves.  Mask used was standard procedure mask. Appropriate PPE was worn during the entire therapy session. Hand hygiene was completed before and after therapy session. Patient is not in enhanced droplet precautions.

## 2022-01-12 NOTE — THERAPY TREATMENT NOTE
"Patient Name: Dilip Verma  : 1950    MRN: [id number]                              Today's Date: 2022       Admit Date: 2022    Visit Dx: No diagnosis found.  Patient Active Problem List   Diagnosis   • Anxiety   • Colon polyp   • Type 2 diabetes mellitus with hyperglycemia, with long-term current use of insulin ([facility])   • Erectile dysfunction   • Hyperlipidemia   • CAD (coronary artery disease)   • Hx of CABG   • Acute on chronic diastolic CHF (congestive heart failure) ([facility])   • Hypersomnia due to medical condition   • CSA (central sleep apnea)   • Periodic breathing   • HTN (hypertension)   • History of stroke   • CKD (chronic kidney disease) stage 3, GFR 30-59 ml/min ([facility])   • Urinary retention   • Acute on chronic renal failure ([facility])   • Acute on chronic diastolic (congestive) heart failure ([facility])   • Bacteriuria   • Rectal pain   • Status post total replacement of hip     Past Medical History:   Diagnosis Date   • AMENA (acute kidney injury) ([facility]) 12/3/2020   • Anxiety    • Balance disorder    • Chronic diastolic (congestive) heart failure ([facility])    • Chronic kidney disease     stones- had lithotripsy   • CKD (chronic kidney disease) stage 3, GFR 30-59 ml/min ([facility]) 2020   • Closed left subtrochanteric femur fracture, with nonunion, subsequent encounter    • Closed nondisplaced intertrochanteric fracture of left femur ([facility]) 2020   • Colon polyp    • Contracture, right hand    • Coronary artery disease     CABG 2019   • Diabetes mellitus, type II ([facility])    • Erectile dysfunction    • H/O bone density study never   • History of sepsis     2020   • Hyperlipidemia    • Hypersomnia    • Hypertension    • Kidney stone    • Orthostasis    • Sleep apnea     STATES DOESN'T USE BIPAP OR CPAP   • Spinal stenosis in cervical region     SEVERE-LIMITED ROM   • Stroke ([facility])     \"slight stroke\"     Past Surgical History:   Procedure Laterality Date   • CARDIAC CATHETERIZATION N/A 7/15/2019    " Procedure: Coronary angiography;  Surgeon: Carrie Price MD;  Location: [facility] CATH INVASIVE LOCATION;  Service: Cardiovascular   • CARDIAC CATHETERIZATION N/A 7/15/2019    Procedure: Left Heart Cath;  Surgeon: Carrie Price MD;  [facility] CATH INVASIVE LOCATION;  Service: Cardiovascular   • CARDIAC CATHETERIZATION N/A 7/15/2019    Procedure: Left ventriculography;  Surgeon: Carrie Price MD;  Location: [facility] CATH INVASIVE LOCATION;  Service: Cardiovascular   • CARDIAC CATHETERIZATION  7/15/2019    Procedure: Functional Flow [place];  Surgeon: Carrie Price MD;  Location: [facility] CATH INVASIVE LOCATION;  Service: Cardiovascular   • CARDIAC CATHETERIZATION N/A 11/6/2019    Procedure: Right and Left Heart Cath;  Surgeon: Mya Smith MD;  Location: [facility] CATH INVASIVE LOCATION;  Service: Cardiovascular   • CARDIAC CATHETERIZATION N/A 11/6/2019    Procedure: Coronary angiography;  Surgeon: Mya Smith MD;  Location: [facility] CATH INVASIVE LOCATION;  Service: Cardiovascular   • CATARACT EXTRACTION EXTRACAPSULAR W/ INTRAOCULAR LENS IMPLANTATION Bilateral    • COLONOSCOPY  01/2015    dr jimenez   • CORONARY ARTERY BYPASS GRAFT N/A 7/18/2019    Procedure: INTRAOPERATIVE SHOAIB; STERNOTOMY CORONARY ARTERY BYPASS x 3  USING LEFT INTERNAL MAMMARY ARTERY GRAFT UTILIZING ENDOSCOPICALLY HARVESTED RIGHT GREATER SAPHENOUS VEIN AND PRP.;  Surgeon: Bill Devi MD;  Location: [facility] OR;  Service: Cardiothoracic   • DENTAL PROCEDURE      3 surgeries inder implants   • FEMUR IM NAILING/RODDING Left 12/3/2020    Procedure: LEFT HIP INTRAMEDULLARY NAIL;  Surgeon: Niraj Ravi MD;  Location: [facility] OR;  Service: Orthopedic Spine;  Laterality: Left;   • HERNIA REPAIR      inguinal bilaterally   • KIDNEY STONE SURGERY      lithotripsy   • TOTAL HIP ARTHROPLASTY REVISION Left 1/11/2022    Procedure: TOTAL HIP ARTHROPLASTY REVISION- POSTERIOR;  Surgeon: Jurgen Candelaria II, MD;  Location: [facility]  MAIN OR;  Service: Orthopedics;  Laterality: Left;      General Information     Row Name 01/12/22 1238          Physical Therapy Time and Intention    Document Type therapy note (daily note)  -     Mode of Treatment individual therapy; physical therapy  -     Row Name 01/12/22 1238          General Information    Existing Precautions/Restrictions fall  -     Row Name 01/12/22 1238          Cognition    Orientation Status (Cognition) oriented x 3  -     Row Name 01/12/22 1238          Safety Issues, Functional Mobility    Impairments Affecting Function (Mobility) balance; endurance/activity tolerance; strength; pain; postural/trunk control; range of motion (ROM)  -           User Key  (r) = Recorded By, (t) = Taken By, (c) = Cosigned By    Initials Name Provider Type     Gina Fortune PTA Physical Therapy Assistant               Mobility     Row Name 01/12/22 1238          Bed Mobility    Supine-Sit [place] (Bed Mobility) minimum assist (75% patient effort); verbal cues; nonverbal cues (demo/gesture)  -     Assistive Device (Bed Mobility) bed rails; head of bed elevated  -     Comment (Bed Mobility) pt required assistance with left LE getting to EOB.  -     Row Name 01/12/22 1238          Sit-Stand Transfer    Sit-Stand [place] (Transfers) minimum assist (75% patient effort); 2 person assist; moderate assist (50% patient effort); verbal cues; nonverbal cues (demo/gesture)  -     Assistive Device (Sit-Stand Transfers) walker, front-wheeled  -     Row Name 01/12/22 1238          Gait/Stairs (Locomotion)    [place] Level (Gait) minimum assist (75% patient effort); 2 person assist; nonverbal cues (demo/gesture); verbal cues  -     Assistive Device (Gait) walker, front-wheeled  -     Distance in Feet (Gait) 10ft  -     Deviations/Abnormal Patterns (Gait) farhana decreased; antalgic; stride length decreased; gait speed decreased  -     Bilateral Gait Deviations forward flexed  posture; heel strike decreased  -EB     Comment (Gait/Stairs) Increased pain with ambulation. pt unsteady but no major LOB.  -     Row Name 01/12/22 1238          Mobility    Left Lower Extremity (Weight-bearing Status) weight-bearing as tolerated (WBAT)  -           User Key  (r) = Recorded By, (t) = Taken By, (c) = Cosigned By    Initials Name Provider Type    Gina Nolasco PTA Physical Therapy Assistant               Obj/Interventions    No documentation.                Goals/Plan    No documentation.                Clinical Impression     Row Name 01/12/22 1240          Pain Scale: Numbers Pre/Post-Treatment    Pain Location - Side Left  -EB     Pain Location hip  -EB     Row Name 01/12/22 1240          Plan of Care Review    Plan of Care Reviewed With patient  -EB     Progress improving  -EB     Outcome Summary Pt tolerated treatment with c/o left hip pain and increased with ambulation. Pt required Anders with supine to sit needing assistance with left LE. Pt is MinAX2/ModA with sit<->stand transfers. Pt ambulated 10ft with rwx, MinX2. Pt is unsteady but no LOB and fatigues quickly due to increasing pain of left hip. Discussed with pt PT's recommendation for pt to go to SNF to improve strength/endurance and balance. Pt has some reservations of going back to a SNF do to a horrible experiences. Encouraged pt to discuss with wife about SNF vs, home as pt would be a falls risk going home at this time.  -     Row Name 01/12/22 1240          Positioning and Restraints    Pre-Treatment Position in bed  -EB     Post Treatment Position chair  -EB     In Chair call light within reach; encouraged to call for assist; exit alarm on; reclined; with family/caregiver  -           User Key  (r) = Recorded By, (t) = Taken By, (c) = Cosigned By    Initials Name Provider Type    Gina Nolasco PTA Physical Therapy Assistant               Outcome Measures     Row Name 01/12/22 1248          How much help from another  person do you currently need...    Turning from your back to your side while in flat bed without using bedrails? 3  -EB     Moving from lying on back to sitting on the side of a flat bed without bedrails? 3  -EB     Moving to and from a bed to a chair (including a wheelchair)? 2  -EB     Standing up from a chair using your arms (e.g., wheelchair, bedside chair)? 2  -EB     Climbing 3-5 steps with a railing? 1  -EB     To walk in hospital room? 2  -EB     AM-PAC 6 Clicks Score (PT) 13  -EB           User Key  (r) = Recorded By, (t) = Taken By, (c) = Cosigned By    Initials Name Provider Type    Gina Nolasco PTA Physical Therapy Assistant                             Physical Therapy Education                 Title: PT OT SLP Therapies (Done)     Topic: Physical Therapy (Done)     Point: Mobility training (Done)     Learning Progress Summary           Patient Acceptance, E,D, VU,NR by  at 1/12/2022 1246    Acceptance, E,TB,D, VU,NR by  at 1/11/2022 1616   Family Acceptance, E,TB,D, VU,NR by  at 1/11/2022 1616                   Point: Home exercise program (Done)     Learning Progress Summary           Patient Acceptance, E,D, VU,NR by EB at 1/12/2022 1246    Acceptance, E,TB,D, VU,NR by EJ at 1/11/2022 1616   Family Acceptance, E,TB,D, VU,NR by  at 1/11/2022 1616                   Point: Body mechanics (Done)     Learning Progress Summary           Patient Acceptance, E,D, VU,NR by EB at 1/12/2022 1246    Acceptance, E,TB,D, VU,NR by EJ at 1/11/2022 1616   Family Acceptance, E,TB,D, VU,NR by EJ at 1/11/2022 1616                   Point: Precautions (Done)     Learning Progress Summary           Patient Acceptance, E,D, VU,NR by EB at 1/12/2022 1246    Acceptance, E,TB,D, VU,NR by  at 1/11/2022 1616   Family Acceptance, E,TB,D, VU,NR by  at 1/11/2022 1616                               User Key     Initials Effective Dates Name Provider Type Discipline    EJ 06/16/21 -  Karen Calix, PT Physical  Therapist PT    EB 06/16/21 -  Gina Fortune PTA Physical Therapy Assistant PT              PT Recommendation and Plan     Plan of Care Reviewed With: patient  Progress: improving  Outcome Summary: Pt tolerated treatment with c/o left hip pain and increased with ambulation. Pt required Anders with supine to sit needing assistance with left LE. Pt is MinAX2/ModA with sit<->stand transfers. Pt ambulated 10ft with rwx, MinX2. Pt is unsteady but no LOB and fatigues quickly due to increasing pain of left hip. Discussed with pt PT's recommendation for pt to go to SNF to improve strength/endurance and balance. Pt has some reservations of going back to a SNF do to a horrible experiences. Encouraged pt to discuss with wife about SNF vs, home as pt would be a falls risk going home at this time.     Time Calculation:    PT Charges     Row Name 01/12/22 1237             Time Calculation    Start Time 0840  -EB      Stop Time 0903  -EB      Time Calculation (min) 23 min  -EB      PT Received On 01/12/22  -EB      PT - Next Appointment 01/13/22  -EB              Time Calculation- PT    Total Timed Code Minutes- PT 23 minute(s)  -EB            User Key  (r) = Recorded By, (t) = Taken By, (c) = Cosigned By    Initials Name Provider Type    EB Gina Fortune PTA Physical Therapy Assistant              Therapy Charges for Today     Code Description Service Date Service Provider Modifiers Qty    47694151907 HC GAIT TRAINING EA 15 MIN 1/12/2022 Gina Fortune PTA GP 1    21059780826 HC PT THERAPEUTIC ACT EA 15 MIN 1/12/2022 Gina Fotrune PTA GP 1    40934264769 HC PT THER SUPP EA 15 MIN 1/12/2022 Gina Fortune PTA GP 2          PT G-Codes  Outcome Measure Options: AM-PAC 6 Clicks Basic Mobility (PT)  AM-PAC 6 Clicks Score (PT): 13    Gina Fortune PTA  1/12/2022

## 2022-01-13 ENCOUNTER — TRANSCRIBE ORDERS (OUTPATIENT)
Dept: HOME HEALTH SERVICES | Facility: HOME HEALTHCARE | Age: 72
End: 2022-01-13

## 2022-01-13 ENCOUNTER — HOME HEALTH ADMISSION (OUTPATIENT)
Dept: HOME HEALTH SERVICES | Facility: HOME HEALTHCARE | Age: 72
End: 2022-01-13

## 2022-01-13 ENCOUNTER — READMISSION MANAGEMENT (OUTPATIENT)
Dept: CALL CENTER | Facility: HOSPITAL | Age: 72
End: 2022-01-13

## 2022-01-13 VITALS
SYSTOLIC BLOOD PRESSURE: 121 MMHG | OXYGEN SATURATION: 95 % | HEART RATE: 85 BPM | TEMPERATURE: 98.7 F | DIASTOLIC BLOOD PRESSURE: 73 MMHG | BODY MASS INDEX: 26.82 KG/M2 | HEIGHT: 69 IN | RESPIRATION RATE: 16 BRPM | WEIGHT: 181.1 LBS

## 2022-01-13 DIAGNOSIS — T84.011A FAILURE OF LEFT TOTAL HIP ARTHROPLASTY, INITIAL ENCOUNTER: Primary | ICD-10-CM

## 2022-01-13 LAB
ALBUMIN SERPL-MCNC: 3 G/DL (ref 3.5–5.2)
ANION GAP SERPL CALCULATED.3IONS-SCNC: 7 MMOL/L (ref 5–15)
BUN SERPL-MCNC: 38 MG/DL (ref 8–23)
BUN/CREAT SERPL: 29.7 (ref 7–25)
CALCIUM SPEC-SCNC: 7.9 MG/DL (ref 8.6–10.5)
CHLORIDE SERPL-SCNC: 105 MMOL/L (ref 98–107)
CO2 SERPL-SCNC: 27 MMOL/L (ref 22–29)
CREAT SERPL-MCNC: 1.28 MG/DL (ref 0.76–1.27)
DEPRECATED RDW RBC AUTO: 44 FL (ref 37–54)
ERYTHROCYTE [DISTWIDTH] IN BLOOD BY AUTOMATED COUNT: 12.4 % (ref 12.3–15.4)
GFR SERPL CREATININE-BSD FRML MDRD: 55 ML/MIN/1.73
GLUCOSE BLDC GLUCOMTR-MCNC: 133 MG/DL (ref 70–130)
GLUCOSE SERPL-MCNC: 137 MG/DL (ref 65–99)
HCT VFR BLD AUTO: 32.2 % (ref 37.5–51)
HGB BLD-MCNC: 10.5 G/DL (ref 13–17.7)
MCH RBC QN AUTO: 31.5 PG (ref 26.6–33)
MCHC RBC AUTO-ENTMCNC: 32.6 G/DL (ref 31.5–35.7)
MCV RBC AUTO: 96.7 FL (ref 79–97)
PHOSPHATE SERPL-MCNC: 2.6 MG/DL (ref 2.5–4.5)
PLATELET # BLD AUTO: 150 10*3/MM3 (ref 140–450)
PMV BLD AUTO: 10.9 FL (ref 6–12)
POTASSIUM SERPL-SCNC: 4.1 MMOL/L (ref 3.5–5.2)
RBC # BLD AUTO: 3.33 10*6/MM3 (ref 4.14–5.8)
SODIUM SERPL-SCNC: 139 MMOL/L (ref 136–145)
WBC NRBC COR # BLD: 9.02 10*3/MM3 (ref 3.4–10.8)

## 2022-01-13 PROCEDURE — G0378 HOSPITAL OBSERVATION PER HR: HCPCS

## 2022-01-13 PROCEDURE — 97116 GAIT TRAINING THERAPY: CPT

## 2022-01-13 PROCEDURE — 80069 RENAL FUNCTION PANEL: CPT | Performed by: INTERNAL MEDICINE

## 2022-01-13 PROCEDURE — 85027 COMPLETE CBC AUTOMATED: CPT | Performed by: INTERNAL MEDICINE

## 2022-01-13 PROCEDURE — 97530 THERAPEUTIC ACTIVITIES: CPT

## 2022-01-13 PROCEDURE — 82962 GLUCOSE BLOOD TEST: CPT

## 2022-01-13 RX ORDER — HYDROCODONE BITARTRATE AND ACETAMINOPHEN 5; 325 MG/1; MG/1
1 TABLET ORAL EVERY 4 HOURS PRN
Qty: 50 TABLET | Refills: 0 | Status: SHIPPED | OUTPATIENT
Start: 2022-01-13 | End: 2022-09-19

## 2022-01-13 RX ADMIN — MELOXICAM 15 MG: 15 TABLET ORAL at 09:56

## 2022-01-13 RX ADMIN — ASPIRIN 81 MG: 81 TABLET, COATED ORAL at 09:56

## 2022-01-13 RX ADMIN — SPIRONOLACTONE 25 MG: 25 TABLET ORAL at 09:57

## 2022-01-13 RX ADMIN — FAMOTIDINE 40 MG: 20 TABLET, FILM COATED ORAL at 09:56

## 2022-01-13 RX ADMIN — BUMETANIDE 1 MG: 1 TABLET ORAL at 09:57

## 2022-01-13 RX ADMIN — HYDROCODONE BITARTRATE AND ACETAMINOPHEN 1 TABLET: 5; 325 TABLET ORAL at 05:39

## 2022-01-13 RX ADMIN — HYDROCODONE BITARTRATE AND ACETAMINOPHEN 2 TABLET: 5; 325 TABLET ORAL at 12:13

## 2022-01-13 NOTE — PLAN OF CARE
Goal Outcome Evaluation:  Plan of Care Reviewed With: patient        Progress: improving  Outcome Summary: patient resting comfortably between care, voiding per urinal, bumex restarted tonight, pain controlled at this time, educated on b/p monitoring

## 2022-01-13 NOTE — DISCHARGE INSTRUCTIONS
Total Hip  Discharge Instructions  Dr. VANNA Hubbard” [place]  [phone]    • INCISION CARE  o Wash your hands prior to dressing changes  o VIVEK Wound VAC: Postoperatively you had a VIVEK Wound Vac placed on the incision. This was placed under sterile conditions in the operating room. It remains in place for 7 days postoperatively. After 7 days, the entire dressing must be removed, including all of the sticky adhesive. The dressing and battery pack provide gentle suction to the incision and provide several benefits over a traditional dressing:  - It maintains the sterile environment of the OR and reduces the risk of infection  - The suction removes unwanted buildup of blood/hematoma under the skin to reduce swelling  - The suction also promotes fresh blood supply to the skin and soft tissue to speed up healing  - The postoperative scar is reduced in size  - Showering is permitted immediately after surgery, but the battery pack must be protected or removed during the shower.   o After 7 days the VIVEK Wound Vac is removed. If there is no drainage, no dressing is required. If there is some scant drainage a dry bandage can be applied and changed daily until seen in the office or until the drainage stops.   o No creams or ointments to the incisions until 4 weeks post op.  o Do not touch or pick at the incision  o Check incision every day and notify surgeon immediately if any of the following signs or symptoms are seen:  - Increase in redness  - Increase in swelling around the incision and of the entire extremity  - Increase in pain  - NEW drainage or oozing from the incision  - Pulling apart of the edges of the incision  - Increase in overall body temperature (greater than 100.4 degrees)  o Zip-Line: your incision was closed with a state of the art device.   - Is a non-invasive and easy to use wound closure device that replaces sutures, staples and glue for surgical incisions  - It minimizes scarring and eliminating  “railroad” marks that come with staples or sutures  - It makes removal as atraumatic as peeling off a bandage  - Can be removed at home or by a physical therapist or nurse at 14 days postoperatively    • ACTIVITIES  o Exercises:  - Physical therapy will begin immediately while in the hospital. Patients going to a nursing home will get therapy as part of their care at the SNU/SNF facility. Patients going home may also have a therapist come to the house to help them mobilize until they can safely get to an outpatient therapy facility.  - Elevate the affected leg most of the day during the first week post operatively. Caution must be taken to avoid pillow placement directly under the heel of the leg, as this can cause pressure ulcers even with a soft pillow. All pillows and blankets should be placed underneath of the thigh and calf so that the heel is free-floating.  - Use cold packs for 20-30 minutes approximately 5 times per day.  - You should perform the daily stretching and strengthening exercises as taught by the therapist as often as possible. This can be done many times a day.  - Full weight bearing is allowed after surgery. It will be sore/painful to put weight on the leg, but this will help the bone to heal and prevent complications such as pneumonia, bed sores and blood clots. Mobilization is vital to the recovery process.  o Activities of Daily Living:  - No tub baths, hot tubs, or swimming pools for 4 weeks.  - May shower and let water run over the incision immediately after surgery. The battery pack of the VIVEK Wound Vac must be protected or removed while in the shower. After the VIVEK is removed 7 days after surgery showering is permitted as long as there is no drainage from the wound.     • Restrictions  o Weight: It is ok to allow full weight bearing after surgery. Weight on the leg actually quickens the recovery process. While it will be sore/painful to put weight on the leg, it is safe to do so. Hip  replacement after hip fracture has a much slower recover process. It can take months to heal fully from a hip fracture and patients even make some slow benefits up to a year afterwards.   o Driving: Many patients have questions about when it is safe to return to driving. The answer is that this is extremely variable. It depends on the extent of the surgery, as well as how quickly you heal. Certainly left leg surgeries make returning to driving easier while right leg surgeries require more extensive rehabilitation before driving can be safe. Until you can press down on the brake hard, and are off of all narcotics, driving is not permitted. Your surgeon cannot “clear” you to return to driving, only you can make the decision when you feel it is safe.    • Medications  o Anticoagulants: After upper extremity surgery most patients do not require an anticoagulant unless you have another injury that will be keeping you from mobilizing. Lower extremity surgery typically does require use of an anticoagulation medicine.   - IF YOU HAD LOWER EXTREMITY SURGERY AND ARE NOT DISCHARGED HOME WITH ANY ANTICOAGULANT MEDICINE YOU SHOULD TAKE ASPIRIN 325mg DAILY FOR 30 DAYS POSTOPERATIVELY.  - If you are discharged home with an anticoagulant such as Aspirin, Xarelto, Eliquis, Coumadin, or Lovenox, follow these simple instructions:   - Notify surgeon immediately if any kelli bleeding is noted in the urine, stool, emesis, or from the nose or the incision. Blood in the stool will often appear as black rather than red. Blood in urine may appear as pink. Blood in emesis may appear as brown/black like coffee grounds.  - You will need to apply pressure for longer periods of time to any cuts or abrasions to stop bleeding  - Avoid alcohol while taking anticoagulants  - Most anticoagulants are to be taken for 30 days postoperatively. After this time, you may stop using them unless instructed otherwise.   - If you were already taking an  anticoagulant (commonly Aspirin, Coumadin, or Plavix) you will likely be resuming your normal dose postoperatively and will be continuing that medication at the discretion of the prescribing physician.  o Stool Softeners: You will be at greater risk of constipation after surgery due to being less mobile and the pain medications.  - Take stool softeners as needed. Over the counter Colace 100 mg 1-2 capsules twice daily can be taken.  - If stools become too loose or too frequent, please decreases the dosage or stop the stool softener.  - If constipation occurs despite use of stool softeners, you are to continue the stool softeners and add a laxative (Milk of Magnesia 1 ounce daily as needed)  - Drink plenty of fluids, and eat fruits and vegetables during your recovery time. Getting up and mobilizing will help the bowels to recover their regular function, as will weaning off of all narcotics when the pain becomes tolerable.  o Pain Medications: Utilized after surgery are narcotics. This is some general information about these medications.  - CLASSIFICATION: Pain medications are called Opioids and are narcotics  - LEGALITIES: It is illegal to share narcotics with others  - DRIVING: it is illegal to drive while under the influence of narcotics. Doing so is a DUI.  - POTENTIAL SIDE EFFECTS: nausea, vomiting, itching, dizziness, drowsiness, dry mouth, constipation, and difficulty urinating.  - POTENTIAL ADVERSE EFFECTS:  - Opioid tolerance can develop with use of pain medications and this simply means that it requires more and more of the medication to control pain. However, this is seen more in patients that use opioids for longer periods of time.  - Opioid dependence can develop with use of Opioids. People with opioid dependence will experience withdrawal symptoms upon cessation of the medication.  - Opioid addiction can develop with use of Opioids. The incidence of this is very unlikely in patients who take the  medications as ordered and stop the medications as instructed.  - Opioid overdose can be dangerous, but is unlikely when the medication is taken as ordered and stopped when ordered. It is important not to mix opioids with alcohol as this can lead to over sedation and respiratory difficulty.  - DOSAGE:  - After the initial surgical pain begins to resolve, you may begin to decrease the pain medication. By the end of a few weeks, you should be off of pain medications.  - Refills will not be given by the office during evening hours, on weekends, or after 6 weeks post-op. You are responsible for weaning off of pain medication. You can increase the time between narcotic pills, taking one every 4 then 6 then 8 hours and so on.  - To seek refills on pain medications during the initial 6-week post-operative period, you must call the office to request the refill. The office will then notify you when to  the prescription. DO NOT wait until you are out of the medication to request a refill. Prescriptions will not be filled over the weekend and depending on the schedule, it may take a couple days for the prescription to be available. Someone will have to pick the prescription in person at the office.    • FOLLOW-UP VISITS  o You will need to follow up in the office with your surgeon in 3 weeks, or as instructed elsewhere in your discharge paperwork. Please call this number [phone] to schedule this appointment. If you are going to an SNF/SNU facility, they will arrange for you to follow up in the office.  o If you have any concerns or suspected complications prior to your follow up visit, please call the office. Do not wait until your appointment time if you suspect complications. These will need to be addressed in the office promptly.      Jurgen Candelaria II, MD  Orthopaedic Surgery  [facility]

## 2022-01-13 NOTE — DISCHARGE SUMMARY
Orthopaedic Discharge Summary  Dr. PRABHAKAR “Aren” [facility]  [phone]    NAME: Dilip Verma PCP: Dakota Avila MD   :  MRN: [id number]  [id number] LOS:  ADMIT: 0 days  2022   AGE/SEX: 71 y.o. 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]    · Surgery Performed: [place] REVISE TOTAL HIP REPLACEMENT [51174] (TOTAL HIP ARTHROPLASTY REVISION- POSTERIOR)    · Discharge Medications:         Discharge Medications      New Medications      Instructions Start Date   HYDROcodone-acetaminophen 5-325 MG per tablet  Commonly known as: NORCO   1 tablet, Oral, Every 4 Hours PRN         Changes to Medications      Instructions Start Date   insulin lispro 100 UNIT/ML injection  Commonly known as: humaLOG  What changed: additional instructions   0-14 Units, Subcutaneous, 3 Times Daily Before Meals      sildenafil 20 MG tablet  Commonly known as: REVATIO  What changed: additional instructions   Take 1-3 tablets 1 hr prior to sexual activity         Continue These Medications      Instructions Start Date   Accu-Chek Guide test strip  Generic drug: glucose blood   1 each, Other, 3 Times Daily      AFRIN NASAL SPRAY NA   Nasal      aspirin 81 MG EC tablet   81 mg, Oral, Daily, HOLD PRIOR TO SURGERY PER MD INSTRUCTIONS      bumetanide 1 MG tablet  Commonly known as: BUMEX   1 mg, Oral, 2 Times Daily      Chlorhexidine Gluconate Cloth 2 % pads   Apply externally, Take As Directed, PRIOR TO SURGERY       Insulin Glargine (2 Unit Dial) 300 UNIT/ML solution pen-injector injection  Commonly known as: TOUJEO   36 Units, Subcutaneous, Daily      mupirocin 2 % nasal ointment  Commonly known as: BACTROBAN   Nasal, Take As Directed, PRIOR TO SURGERY       sennosides-docusate 8.6-50 MG per tablet  Commonly known as: PERICOLACE   2 tablets, Oral, 2 Times Daily PRN      spironolactone 25 MG tablet  Commonly known as: ALDACTONE   25 mg, Oral, Daily      testosterone 25 MG/2.5GM (1%) gel  gel  Commonly known as: ANDROGEL   Transdermal, Daily      traMADol 50 MG tablet  Commonly known as: ULTRAM   50 mg, Oral, Every 8 Hours PRN      vitamin D3 125 MCG (5000 UT) capsule capsule   5,000 Units, Oral, Daily             · Vitals:     Vitals:    01/12/22 1512 01/12/22 1900 01/12/22 2300 01/13/22 0629   BP: 120/65 123/74 113/60 121/73   BP Location: Right arm Left arm Right arm Left arm   Patient Position: Lying Lying Lying Lying   Pulse: 82 84 89 85   Resp: 18 16 16 16   Temp: 98.5 °F (36.9 °C)  98 °F (36.7 °C) 98.7 °F (37.1 °C)   TempSrc: Oral  Oral Oral   SpO2: 96% 95% 93% 95%   Weight:       Height:           · Labs:      Admission on 01/11/2022   Component Date Value Ref Range Status   • Glucose 01/11/2022 175* 70 - 130 mg/dL Final    Meter: [id number] : [id number] Ochoa FERRARI   • Glucose 01/11/2022 155* 70 - 130 mg/dL Final    Meter: [id number] : [id number] Murphy De La Torre RN   • Hemoglobin 01/11/2022 13.4  13.0 - 17.7 g/dL Final   • Hematocrit 01/11/2022 39.2  37.5 - 51.0 % Final   • Glucose 01/11/2022 332* 70 - 130 mg/dL Final    Meter: [id number] : [id number] Avi Garay PCA   • Glucose 01/11/2022 347* 70 - 130 mg/dL Final    Meter: [id number] : [id number] Avi Garay PCA   • Glucose 01/11/2022 375* 70 - 130 mg/dL Final    Meter: [id number] : [id number] Soy FERRARI   • Glucose 01/12/2022 161* 65 - 99 mg/dL Final   • BUN 01/12/2022 40* 8 - 23 mg/dL Final   • Creatinine 01/12/2022 1.39* 0.76 - 1.27 mg/dL Final   • Sodium 01/12/2022 139  136 - 145 mmol/L Final   • Potassium 01/12/2022 4.0  3.5 - 5.2 mmol/L Final   • Chloride 01/12/2022 102  98 - 107 mmol/L Final   • CO2 01/12/2022 28.6  22.0 - 29.0 mmol/L Final   • Calcium 01/12/2022 8.1* 8.6 - 10.5 mg/dL Final   • eGFR Non African Amer 01/12/2022 50* >60 mL/min/1.73 Final   • BUN/Creatinine Ratio 01/12/2022 28.8* 7.0 - 25.0 Final   • Anion Gap 01/12/2022 8.4  5.0 - 15.0 mmol/L Final   • Hemoglobin 01/12/2022  11.3* 13.0 - 17.7 g/dL Final   • Hematocrit 01/12/2022 32.5* 37.5 - 51.0 % Final   • TSH 01/12/2022 0.394  0.270 - 4.200 uIU/mL Final   • Glucose 01/12/2022 168* 70 - 130 mg/dL Final    Meter: [id number] : [id number] Polo Michasouleymane NA   • Glucose 01/12/2022 130  70 - 130 mg/dL Final    Meter: [id number] : [id number] Avi Garay PCA   • Glucose 01/12/2022 217* 70 - 130 mg/dL Final    Meter: [id number] : [id number] Avi PortilloShmayanke PCA   • Glucose 01/12/2022 135* 70 - 130 mg/dL Final    Meter: [id number] : [id number] Hermila Gonzales RN   • Glucose 01/13/2022 137* 65 - 99 mg/dL Final   • BUN 01/13/2022 38* 8 - 23 mg/dL Final   • Creatinine 01/13/2022 1.28* 0.76 - 1.27 mg/dL Final   • Sodium 01/13/2022 139  136 - 145 mmol/L Final   • Potassium 01/13/2022 4.1  3.5 - 5.2 mmol/L Final   • Chloride 01/13/2022 105  98 - 107 mmol/L Final   • CO2 01/13/2022 27.0  22.0 - 29.0 mmol/L Final   • Calcium 01/13/2022 7.9* 8.6 - 10.5 mg/dL Final   • Albumin 01/13/2022 3.00* 3.50 - 5.20 g/dL Final   • Phosphorus 01/13/2022 2.6  2.5 - 4.5 mg/dL Final   • Anion Gap 01/13/2022 7.0  5.0 - 15.0 mmol/L Final   • BUN/Creatinine Ratio 01/13/2022 29.7* 7.0 - 25.0 Final   • eGFR Non African Amer 01/13/2022 55* >60 mL/min/1.73 Final   • WBC 01/13/2022 9.02  3.40 - 10.80 10*3/mm3 Final   • RBC 01/13/2022 3.33* 4.14 - 5.80 10*6/mm3 Final   • Hemoglobin 01/13/2022 10.5* 13.0 - 17.7 g/dL Final   • Hematocrit 01/13/2022 32.2* 37.5 - 51.0 % Final   • MCV 01/13/2022 96.7  79.0 - 97.0 fL Final   • MCH 01/13/2022 31.5  26.6 - 33.0 pg Final   • MCHC 01/13/2022 32.6  31.5 - 35.7 g/dL Final   • RDW 01/13/2022 12.4  12.3 - 15.4 % Final   • RDW-SD 01/13/2022 44.0  37.0 - 54.0 fl Final   • MPV 01/13/2022 10.9  6.0 - 12.0 fL Final   • Platelets 01/13/2022 150  140 - 450 10*3/mm3 Final   • Glucose 01/13/2022 133* 70 - 130 mg/dL Final    Meter: [id number] : [id number] Julee FERRARI        No results found.    · Hospital Course:  "  71 y.o. 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 Post-operatively the patient transferred to the floor where the patient underwent mobilization therapy. Opioids were titrated to achieve appropriate pain management to allow for participation in mobilization exercises. Vital signs and laboratory values are now within safe parameters for discharge. The dressings and/or incision is intact without signs or symptoms of active infection. Operative extremity neurovascular status remains intact as compared to the preoperative exam. Appropriate education re: incision care, activity levels, medications, and follow up visits was completed and all questions were answered. The patient is now deemed stable for discharge.    HOME: The patient progressed well with physical therapy. There were cleared for discharge to home. The vanceetn was sent home in good condition}.       R \"Aren\" Teagan PAIGE MD  Orthopaedic Surgery  [facility]  [phone]                                               "

## 2022-01-13 NOTE — THERAPY TREATMENT NOTE
"Patient Name: Dilip Verma  : 1950    MRN: [id number]                              Today's Date: 2022       Admit Date: 2022    Visit Dx: No diagnosis found.  Patient Active Problem List   Diagnosis   • Anxiety   • Colon polyp   • Type 2 diabetes mellitus with hyperglycemia, with long-term current use of insulin ([facility])   • Erectile dysfunction   • Hyperlipidemia   • CAD (coronary artery disease)   • Hx of CABG   • Acute on chronic diastolic CHF (congestive heart failure) ([facility])   • Hypersomnia due to medical condition   • CSA (central sleep apnea)   • Periodic breathing   • HTN (hypertension)   • History of stroke   • CKD (chronic kidney disease) stage 3, GFR 30-59 ml/min ([facility])   • Urinary retention   • Acute on chronic renal failure ([facility])   • Acute on chronic diastolic (congestive) heart failure ([facility])   • Bacteriuria   • Rectal pain   • Status post total replacement of hip     Past Medical History:   Diagnosis Date   • AMENA (acute kidney injury) ([facility]) 12/3/2020   • Anxiety    • Balance disorder    • Chronic diastolic (congestive) heart failure ([facility])    • Chronic kidney disease     stones- had lithotripsy   • CKD (chronic kidney disease) stage 3, GFR 30-59 ml/min ([facility]) 2020   • Closed left subtrochanteric femur fracture, with nonunion, subsequent encounter    • Closed nondisplaced intertrochanteric fracture of left femur ([facility]) 2020   • Colon polyp    • Contracture, right hand    • Coronary artery disease     CABG 2019   • Diabetes mellitus, type II ([facility])    • Erectile dysfunction    • H/O bone density study never   • History of sepsis     2020   • Hyperlipidemia    • Hypersomnia    • Hypertension    • Kidney stone    • Orthostasis    • Sleep apnea     STATES DOESN'T USE BIPAP OR CPAP   • Spinal stenosis in cervical region     SEVERE-LIMITED ROM   • Stroke ([facility])     \"slight stroke\"     Past Surgical History:   Procedure Laterality Date   • CARDIAC CATHETERIZATION N/A 7/15/2019    " Procedure: Coronary angiography;  Surgeon: Carrie Price MD;  Location: [facility] CATH INVASIVE LOCATION;  Service: Cardiovascular   • CARDIAC CATHETERIZATION N/A 7/15/2019    Procedure: Left Heart Cath;  Surgeon: Carrie Price MD;  Location[facility] CATH INVASIVE LOC[facility];  Service: Cardiovascular   • CARDIAC CATHETERIZATION N/A 7/15/2019    Procedure: Left ventriculography;  Surgeon: Carrie Price MD;  Location: [facility] CATH INVASIVE LOCATION;  Service: Cardiovascular   • CARDIAC CATHETERIZATION  7/15/2019    Procedure: Functional Flow [place];  Surgeon: Carrie Price MD;  Location: [facility] CATH INVASIVE LOCATION;  Service: Cardiovascular   • CARDIAC CATHETERIZATION N/A 11/6/2019    Procedure: Right and Left Heart Cath;  Surgeon: Mya Smith MD;  Location: [facility] CATH INVASIVE LOCATION;  Service: Cardiovascular   • CARDIAC CATHETERIZATION N/A 11/6/2019    Procedure: Coronary angiography;  Surgeon: Mya Smith MD;  Location: [facility] CATH INVASIVE LOCATION;  Service: Cardiovascular   • CATARACT EXTRACTION EXTRACAPSULAR W/ INTRAOCULAR LENS IMPLANTATION Bilateral    • COLONOSCOPY  01/2015    dr [email or web address]   • CORONARY ARTERY BYPASS GRAFT N/A 7/18/2019    Procedure: INTRAOPERATIVE SHOAIB; STERNOTOMY CORONARY ARTERY BYPASS x 3  USING LEFT INTERNAL MAMMARY ARTERY GRAFT UTILIZING ENDOSCOPICALLY HARVESTED RIGHT GREATER SAPHENOUS VEIN AND PRP.;  Surgeon: Bill Devi MD;  Location: [facility] OR;  Service: Cardiothoracic   • DENTAL PROCEDURE      3 surgeries inder implants   • FEMUR IM NAILING/RODDING Left 12/3/2020    Procedure: LEFT HIP INTRAMEDULLARY NAIL;  Surgeon: Niraj Ravi MD;  Location: [facility] OR;  Service: Orthopedic Spine;  Laterality: Left;   • HERNIA REPAIR      inguinal bilaterally   • KIDNEY STONE SURGERY      lithotripsy   • TOTAL HIP ARTHROPLASTY REVISION Left 1/11/2022    Procedure: TOTAL HIP ARTHROPLASTY REVISION- POSTERIOR;  Surgeon: Jurgen Candelaria II, MD;  Location: [facility]  MAIN OR;  Service: Orthopedics;  Laterality: Left;      General Information     Row Name 01/13/22 1235          Physical Therapy Time and Intention    Document Type therapy note (daily note)  -EB     Mode of Treatment individual therapy; physical therapy  -EB     Row Name 01/13/22 1235          General Information    Existing Precautions/Restrictions fall  -EB     Row Name 01/13/22 1235          Cognition    Orientation Status (Cognition) oriented x 3  -EB     Row Name 01/13/22 1235          Safety Issues, Functional Mobility    Impairments Affecting Function (Mobility) balance; endurance/activity tolerance; strength; range of motion (ROM); pain  -EB           User Key  (r) = Recorded By, (t) = Taken By, (c) = Cosigned By    Initials Name Provider Type    EB Gina Fortune PTA Physical Therapy Assistant               Mobility     Row Name 01/13/22 1236          Bed Mobility    Supine-Sit [place] (Bed Mobility) minimum assist (75% patient effort); nonverbal cues (demo/gesture); verbal cues  -EB     Assistive Device (Bed Mobility) bed rails; head of bed elevated  -EB     Comment (Bed Mobility) increased time with getting to EOB.  -     Row Name 01/13/22 1236          Sit-Stand Transfer    Sit-Stand [place] (Transfers) minimum assist (75% patient effort); moderate assist (50% patient effort); nonverbal cues (demo/gesture); verbal cues  -EB     Assistive Device (Sit-Stand Transfers) walker, front-wheeled  -EB     Row Name 01/13/22 1236          Gait/Stairs (Locomotion)    [place]nyder Level (Gait) minimum assist (75% patient effort); verbal cues; nonverbal cues (demo/gesture)  -EB     Assistive Device (Gait) walker, front-wheeled  -EB     Distance in Feet (Gait) 5ft  -EB     Deviations/Abnormal Patterns (Gait) stride length decreased; gait speed decreased; farhana decreased; antalgic  -EB     Bilateral Gait Deviations forward flexed posture; heel strike decreased  -EB     Comment (Gait/Stairs) cues for  upright posture. Pt unsteady but no LOB. Pt's ambulation distance is limited due to pain.  -     Row Name 01/13/22 1236          Mobility    Extremity Weight-bearing Status left lower extremity  -EB     Left Lower Extremity (Weight-bearing Status) weight-bearing as tolerated (WBAT)  -           User Key  (r) = Recorded By, (t) = Taken By, (c) = Cosigned By    Initials Name Provider Type    Gina Nolasco PTA Physical Therapy Assistant               Obj/Interventions    No documentation.                Goals/Plan    No documentation.                Clinical Impression     Row Name 01/13/22 1237          Pain Scale: Numbers Pre/Post-Treatment    Pain Location - Side Left  -EB     Pain Location hip  -EB     Row Name 01/13/22 1237          Plan of Care Review    Plan of Care Reviewed With patient  -     Progress improving  -     Outcome Summary Pt tolerated treatment with c/o left hip pain. Pt required Anders with bed mobility and increased time and assist with left LE. Pt is Anders/ModA with sit<->stand transfers. Pt ambulated 5ft with rwx, Anders. Pt unsteady at times but no LOB. Pt's pain is limiting ambulation distance. Cues for upright posture and increasing step length. Discussed and answered concerns that pt and pt's spouse had for home. Pt has a ramp, transport chair, walker, grab bars and lift chair. Pt is to d/c home with HHPT and with assist.  -     Row Name 01/13/22 1237          Positioning and Restraints    Pre-Treatment Position in bed  -EB     Post Treatment Position chair  -EB     In Chair reclined; call light within reach; encouraged to call for assist; exit alarm on; with family/caregiver  -           User Key  (r) = Recorded By, (t) = Taken By, (c) = Cosigned By    Initials Name Provider Type    Gina Nolasco PTA Physical Therapy Assistant               Outcome Measures     Row Name 01/13/22 1241          How much help from another person do you currently need...    Turning from your  back to your side while in flat bed without using bedrails? 3  -EB     Moving from lying on back to sitting on the side of a flat bed without bedrails? 3  -EB     Moving to and from a bed to a chair (including a wheelchair)? 3  -EB     Standing up from a chair using your arms (e.g., wheelchair, bedside chair)? 2  -EB     Climbing 3-5 steps with a railing? 1  -EB     To walk in hospital room? 2  -EB     AM-PAC 6 Clicks Score (PT) 14  -EB           User Key  (r) = Recorded By, (t) = Taken By, (c) = Cosigned By    Initials Name Provider Type    Gina Nolasco PTA Physical Therapy Assistant                             Physical Therapy Education                 Title: PT OT SLP Therapies (Done)     Topic: Physical Therapy (Done)     Point: Mobility training (Done)     Learning Progress Summary           Patient Acceptance, E,D, VU,NR by EB at 1/13/2022 1242    Acceptance, E, VU by CS at 1/12/2022 1900    Acceptance, E,D, VU,NR by EB at 1/12/2022 1246    Acceptance, E,TB,D, VU,NR by EJ at 1/11/2022 1616   Family Acceptance, E,TB,D, VU,NR by EJ at 1/11/2022 1616                   Point: Home exercise program (Done)     Learning Progress Summary           Patient Acceptance, E,D, VU,NR by EB at 1/13/2022 1242    Acceptance, E, VU by CS at 1/12/2022 1900    Acceptance, E,D, VU,NR by EB at 1/12/2022 1246    Acceptance, E,TB,D, VU,NR by EJ at 1/11/2022 1616   Family Acceptance, E,TB,D, VU,NR by EJ at 1/11/2022 1616                   Point: Body mechanics (Done)     Learning Progress Summary           Patient Acceptance, E,D, VU,NR by EB at 1/13/2022 1242    Acceptance, E, VU by CS at 1/12/2022 1900    Acceptance, E,D, VU,NR by EB at 1/12/2022 1246    Acceptance, E,TB,D, VU,NR by EJ at 1/11/2022 1616   Family Acceptance, E,TB,D, VU,NR by EJ at 1/11/2022 1616                   Point: Precautions (Done)     Learning Progress Summary           Patient Acceptance, E,D, VU,NR by EB at 1/13/2022 1242    Acceptance, E, VU by CS at  1/12/2022 1900    Acceptance, E,D, VU,NR by  at 1/12/2022 1246    Acceptance, E,TB,D, VU,NR by  at 1/11/2022 1616   Family Acceptance, E,TB,D, VU,NR by  at 1/11/2022 1616                               User Key     Initials Effective Dates Name Provider Type Discipline     06/16/21 -  Karen Calix, PT Physical Therapist PT     06/16/21 -  Gina Fortune PTA Physical Therapy Assistant PT     02/02/21 -  Juana Cason RN Registered Nurse Nurse              PT Recommendation and Plan     Plan of Care Reviewed With: patient  Progress: improving  Outcome Summary: Pt tolerated treatment with c/o left hip pain. Pt required Anders with bed mobility and increased time and assist with left LE. Pt is Anders/ModA with sit<->stand transfers. Pt ambulated 5ft with rwx, Anders. Pt unsteady at times but no LOB. Pt's pain is limiting ambulation distance. Cues for upright posture and increasing step length. Discussed and answered concerns that pt and pt's spouse had for home. Pt has a ramp, transport chair, walker, grab bars and lift chair. Pt is to d/c home with HHPT and with assist.     Time Calculation:    PT Charges     Row Name 01/13/22 1235             Time Calculation    Start Time 0858  -EB      Stop Time 0933  -      Time Calculation (min) 35 min  -      PT Received On 01/13/22  -      PT - Next Appointment 01/14/22  -              Time Calculation- PT    Total Timed Code Minutes- PT 35 minute(s)  -            User Key  (r) = Recorded By, (t) = Taken By, (c) = Cosigned By    Initials Name Provider Type     Gina Fortune PTA Physical Therapy Assistant              Therapy Charges for Today     Code Description Service Date Service Provider Modifiers Qty    33214203174 HC GAIT TRAINING EA 15 MIN 1/12/2022 Gina Fortune PTA GP 1    23131541592 HC PT THERAPEUTIC ACT EA 15 MIN 1/12/2022 Gina Fortune PTA GP 1    47199265370 HC PT THER SUPP EA 15 MIN 1/12/2022 Gina Fortune PTA GP 2     15219620553 HC GAIT TRAINING EA 15 MIN 1/13/2022 Gina Fortune, PTA GP 1    84623509354 HC PT THERAPEUTIC ACT EA 15 MIN 1/13/2022 Gina Fortune, RONY GP 1          PT G-Codes  Outcome Measure Options: AM-PAC 6 Clicks Basic Mobility (PT)  AM-PAC 6 Clicks Score (PT): 14    Gina Fortune PTA  1/13/2022

## 2022-01-13 NOTE — PROGRESS NOTES
Patient is discharging today . Patient agreeable to home health and face sheet information is correct . Will transcribe home health orders for home health PT. Thank you ! Irma Scott RN

## 2022-01-13 NOTE — PLAN OF CARE
Goal Outcome Evaluation:              Outcome Summary: Pt POD 1 LT total hip revision with VIVEK dressing with moderate dried drainage, marked and informed Dr Candelaria 2. VSS, on accucheclks, SCD's , ice packs on, PRN pain meds given. Plan home vs HH, doesnt want rehab, needs attended.

## 2022-01-13 NOTE — CASE MANAGEMENT/SOCIAL WORK
Continued Stay Note  [facility]     Patient Name: Dilip Verma  MRN: [id number]  Today's Date: 1/13/2022    Admit Date: 1/11/2022     Discharge Plan     Row Name 01/13/22 1641       Plan    [facility]    Patient/Family in Agreement with Plan yes    Plan Comments Received a call back from the patient's wife/Beny and informed her that [facility] has accepted and will follow the patient's case. Beny is agreeable. No other needs identified.    Row Name 01/13/22 1346       Plan    Plan Home with family support & [facility] .    Patient/Family in Agreement with Plan yes    Plan Comments Spoke with Linda/[facility] who is unable to accept at this time. Updated wife/Beny who's agreeable with [facility] . Spoke with [facility] who is unable to accept due to staffing. Spoke with Lesli/[facility] who is unable to accept due to insurance. Spoke with Tyra/[facility] who has accepted and will follow the patient's case. Called and left a message for the patient. No other needs identified.    Final Discharge Disposition Code 06 - home with home health care    Final Note Home with [facility].               Discharge Codes    No documentation.               Expected Discharge Date and Time     Expected Discharge Date Expected Discharge Time    Jan 13, 2022             Maru Francisco RN

## 2022-01-13 NOTE — PLAN OF CARE
Goal Outcome Evaluation:              Outcome Summary: Pt for discharge today, discharge instruction given, VIVEK changed, needs attended.

## 2022-01-13 NOTE — PLAN OF CARE
Goal Outcome Evaluation:  Plan of Care Reviewed With: patient        Progress: improving  Outcome Summary: Pt tolerated treatment with c/o left hip pain. Pt required Anders with bed mobility and increased time and assist with left LE. Pt is Anders/ModA with sit<->stand transfers. Pt ambulated 5ft with rwx, Anders. Pt unsteady at times but no LOB. Pt's pain is limiting ambulation distance. Cues for upright posture and increasing step length. Discussed and answered concerns that pt and pt's spouse had for home. Pt has a ramp, transport chair, walker, grab bars and lift chair. Pt is to d/c home with HHPT and with assist.    ..Patient was not wearing a face mask during this therapy encounter. Therapist used appropriate personal protective equipment including eye protection, mask, and gloves.  Mask used was standard procedure mask. Appropriate PPE was worn during the entire therapy session. Hand hygiene was completed before and after therapy session. Patient is not in enhanced droplet precautions.

## 2022-01-13 NOTE — CASE MANAGEMENT/SOCIAL WORK
Continued Stay Note  [facility]     Patient Name: Dilip Verma  MRN: [id number]  Today's Date: 1/13/2022    Admit Date: 1/11/2022     Discharge Plan     Row Name 01/13/22 1129       Plan    Plan Home with family support & [facility] .    Patient/Family in Agreement with Plan yes    Plan Comments Received a call from the patient's wife/Beny who said the patient would like to [facility] the HH agency [facility]. Referral sent in Epic. Called and left a message for Linda/[facility]. Updated [facility]. Await her call back.               Discharge Codes    No documentation.               Expected Discharge Date and Time     Expected Discharge Date Expected Discharge Time    Jan 13, 2022             Maru Francisco RN

## 2022-01-13 NOTE — CASE MANAGEMENT/SOCIAL WORK
Continued Stay Note  [facility]     Patient Name: Dilip Verma  MRN: [id number]  Today's Date: 1/13/2022    Admit Date: 1/11/2022     Discharge Plan     Row Name 01/13/22 1346       Plan    Plan [facility] & [facility].    Patient/Family in Agreement with Plan yes    Plan Comments Spoke with [facility] who is unable to accept at this time. Updated wife/Beny who's agreeable with [facility] . Spoke with [facility] who is unable to accept due to staffing. Spoke with [facility] who is unable to accept due to insurance. Spoke with [facility] who has accepted and will follow the patient's case. Called and left a message for the patient. No other needs identified.    Final Discharge Disposition Code 06 - home with home health care    Final Note Home with [facility].    Row Name 01/13/22 1129       Plan    Plan Home with family support & [facility] .    Patient/Family in Agreement with Plan yes    Plan Comments Received a call from the patient's wife/Beny who said the [facility] would [facility]. Referral sent in Epic. Called and left a message for Linda/[facility]. Updated Irma/[facility]. Await her call back.               Discharge Codes    No documentation.               Expected Discharge Date and Time     Expected Discharge Date Expected Discharge Time    Jan 13, 2022             Maru Francisco RN

## 2022-01-14 ENCOUNTER — TRANSITIONAL CARE MANAGEMENT TELEPHONE ENCOUNTER (OUTPATIENT)
Dept: CALL CENTER | Facility: HOSPITAL | Age: 72
End: 2022-01-14

## 2022-01-14 NOTE — OUTREACH NOTE
Call Center TCM Note      Responses   [facility] patient discharged from? [place]   Does the patient have one of the following disease processes/diagnoses(primary or secondary)? Total Joint Replacement   Joint surgery performed? Hip   TCM attempt successful? Yes   Call start time 1031   Call end time 1033   Discharge diagnosis REVISE TOTAL HIP REPLACEMENT 00140 (TOTAL HIP ARTHROPLASTY REVISION- POSTERIOR)   Is patient permission given to speak with other caregiver? Yes   List who call center can speak with spouse- Beny   Person spoke with today (if not patient) and relationship spouse   Does the patient have all medications related to this admission filled (includes all antibiotics, pain medications, etc.) Yes   Is the patient taking all medications as directed (includes completed medication regime)? Yes   Is the patient able to teach back alternate methods of pain control? Ice,  Reposition,  Short, frequent activity,  Correct alignment   Does the patient have a follow up appointment with their surgeon? No   What is preventing the patient from scheduling follow up appointments within 7 days of discharge? Waiting on return call   Nursing Interventions Advised patient to make appointment   Has the patient kept scheduled appointments due by today? N/A   What is the Home health agency?  [facility]    Has home health visited the patient within 72 hours of discharge? Yes   Home health comments PT should be there in a few mins   Psychosocial issues? No   Has the patient began therapy sessions (either in the home or as an out patient)? No   Has the patient fallen since discharge? No   Did the patient receive a copy of their discharge instructions? Yes   Nursing interventions Reviewed instructions with patient  [with spouse]   What is the patient's perception of their functional status since discharge? Improving   Is the patient able to teach back signs and symptoms of infection? Temp >100.4  for 24h or longer,  Incisional drainage,  Blisters around incision,  Increased swelling or redness around incision (not associated with surgical edema),  Severe discomfort or pain,  Changes in mobility,  Shortness of breath or chest pain   Is the patient able to teach back how to prevent infection? Check incision daily   Is the patient/caregiver able to teach back the hierarchy of who to call/visit for symptoms/problems? PCP, Specialist, Home health nurse, Urgent Care, ED, 911 Yes   TCM call completed? Yes   Wrap up additional comments Per spouse, patient is doing well, they are waiting for PT to arrive, patient will be following up with his surgeon.          Angelica Sharif RN    1/14/2022, 10:33 EST

## 2022-01-14 NOTE — OUTREACH NOTE
Prep Survey      Responses   [facility] patient discharged from? [place]   Is LACE score < 7 ? No   Emergency Room discharge w/ pulse ox? No   Eligibility [facility]   Date of Admission 01/11/22   Date of Discharge 01/13/22   Discharge Disposition Home-Health Care [facility]   Discharge diagnosis REVISE TOTAL HIP REPLACEMENT 13065 (TOTAL HIP ARTHROPLASTY REVISION- POSTERIOR)   Does the patient have one of the following disease processes/diagnoses(primary or secondary)? Total Joint Replacement   Does the patient have Home health ordered? Yes   What is the Home health agency?  [facility]    Is there a DME ordered? No   Prep survey completed? Yes          Di Pritchard RN

## 2022-01-24 ENCOUNTER — READMISSION MANAGEMENT (OUTPATIENT)
Dept: CALL CENTER | Facility: HOSPITAL | Age: 72
End: 2022-01-24

## 2022-01-24 NOTE — OUTREACH NOTE
Total Joint Week 2 Survey      Responses   [facility] patient discharged from? [place]   Does the patient have one of the following disease processes/diagnoses(primary or secondary)? Total Joint Replacement   Joint surgery performed? Hip   Week 2 attempt successful? No   Unsuccessful attempts Attempt 1          Ricarda Freire RN

## 2022-01-27 ENCOUNTER — READMISSION MANAGEMENT (OUTPATIENT)
Dept: CALL CENTER | Facility: HOSPITAL | Age: 72
End: 2022-01-27

## 2022-01-27 NOTE — OUTREACH NOTE
Total Joint Week 2 Survey      Responses   [facility] patient discharged from? [place]   Does the patient have one of the following disease processes/diagnoses(primary or secondary)? Total Joint Replacement   Joint surgery performed? Hip   Week 2 attempt successful? Yes   Call start time 1450   Call end time 1457   Has the patient been back in either the hospital or Emergency Department since discharge? No   Discharge diagnosis REVISE TOTAL HIP REPLACEMENT   Is patient permission given to speak with other caregiver? Yes   List who call center can speak with spouse- Beny   Person spoke with today (if not patient) and relationship spouse   Does the patient have all medications related to this admission filled (includes all antibiotics, pain medications, etc.) Yes   Is the patient taking all medications as directed (includes completed medication regime)? Yes   Is the patient able to teach back alternate methods of pain control? Ice   Does the patient have a follow up appointment with their surgeon? Yes   Has the patient kept scheduled appointments due by today? N/A   What is the Home health agency?  [facility]    Has home health visited the patient within 72 hours of discharge? Yes   Psychosocial issues? No   Has the patient began therapy sessions (either in the home or as an out patient)? Yes   Has the patient fallen since discharge? No   Did the patient receive a copy of their discharge instructions? Yes   Nursing interventions Reviewed instructions with patient   What is the patient's perception of their functional status since discharge? Improving   Is the patient able to teach back signs and symptoms of infection? Temp >100.4 for 24h or longer,  Incisional drainage,  Increased swelling or redness around incision (not associated with surgical edema)   Is the patient able to teach back how to prevent infection? Check incision daily   Is the patient able to teach back home safety  measures? Accessibility to necessary areas in home   If the patient is a current smoker, are they able to teach back resources for cessation? Not a smoker   Is the patient/caregiver able to teach back the hierarchy of who to call/visit for symptoms/problems? PCP, Specialist, Home health nurse, Urgent Care, ED, 911 Yes   Week 2 call completed? Yes          Venus Alexandre RN

## 2022-02-11 ENCOUNTER — READMISSION MANAGEMENT (OUTPATIENT)
Dept: CALL CENTER | Facility: HOSPITAL | Age: 72
End: 2022-02-11

## 2022-02-11 NOTE — OUTREACH NOTE
Total Joint Month 1 Survey      Responses   [facility] patient discharged from? [place]   Does the patient have one of the following disease processes/diagnoses(primary or secondary)? Total Joint Replacement   Joint surgery performed? Hip   Month 1 attempt successful? Yes   Call start time 1622   Call end time 1630   Has the patient been back in either the hospital or Emergency Department since discharge? No   Is the patient taking all medications as directed (includes completed medication regime)? Yes   Is the patient able to teach back alternate methods of pain control? Correct alignment   Has the patient kept scheduled appointments due by today? Yes   Comments States will start outpatient PT with [facility] 02/14/22.   Is the patient still receiving Home Health Services? Yes   Is the patient still attending therapy sessions(either in the home or as an outpatient)? Yes   Has the patient fallen since discharge? No   What is the patient's perception of their functional status since discharge? Improving   Is the patient/caregiver able to teach back the hierarchy of who to call/visit for symptoms/problems? PCP, Specialist, Home health nurse, Urgent Care, ED, 911 Yes   Month 1 call completed? Yes   Wrap up additional comments States is improving-will be starting with outpatient PT next week. States has some pain in arch of foot from twisting foot while climbing into car today. States no swelling or discoloration noted. States incisions are healing without s/s of infection. Denies any needs today.          Angelic Wakefield RN

## 2022-02-16 ENCOUNTER — TELEPHONE (OUTPATIENT)
Dept: FAMILY MEDICINE CLINIC | Facility: CLINIC | Age: 72
End: 2022-02-16

## 2022-02-16 NOTE — TELEPHONE ENCOUNTER
Caller: Beny Verma    Relationship: Emergency Contact    Best call back number: [phone]    What orders are you requesting (i.e. lab or imaging):  OCC THERAPY FOR BOTH HANDS    In what timeframe would the patient need to come in: ASAP    Where will you receive your lab/imaging services: [facility] [phone]    Additional notes: THEY HAVE A HAND SPECIALIST AND WOULD LIKE TO HAVE OCC THERAPY DONE THERE. HE IS ALREADY GOING THERE FOR PT ON HIPS FOR HIP REPLACMENT

## 2022-02-17 NOTE — TELEPHONE ENCOUNTER
THE DX CODE FOR PATIENT IS : G54.8 , G56.31 , G56.11 , T57386 , M24.541, R20.1     CALLBACK # [phone]

## 2022-02-17 NOTE — TELEPHONE ENCOUNTER
PT'S WIFE WANTED TO ADD THAT HE WAS DX BY DR. MILLER AT [facility] IN 2020 FOR THIS, NEEDING OT BILATERAL HANDS

## 2022-03-14 ENCOUNTER — READMISSION MANAGEMENT (OUTPATIENT)
Dept: CALL CENTER | Facility: HOSPITAL | Age: 72
End: 2022-03-14

## 2022-03-18 ENCOUNTER — OFFICE VISIT (OUTPATIENT)
Dept: CARDIOLOGY | Facility: CLINIC | Age: 72
End: 2022-03-18

## 2022-03-18 VITALS
HEART RATE: 71 BPM | BODY MASS INDEX: 28.29 KG/M2 | SYSTOLIC BLOOD PRESSURE: 160 MMHG | HEIGHT: 69 IN | DIASTOLIC BLOOD PRESSURE: 82 MMHG | WEIGHT: 191 LBS

## 2022-03-18 DIAGNOSIS — I50.32 CHRONIC DIASTOLIC CHF (CONGESTIVE HEART FAILURE): ICD-10-CM

## 2022-03-18 DIAGNOSIS — R68.89 ABNORMAL ANKLE BRACHIAL INDEX (ABI): ICD-10-CM

## 2022-03-18 DIAGNOSIS — I25.10 CORONARY ARTERY DISEASE INVOLVING NATIVE CORONARY ARTERY OF NATIVE HEART WITHOUT ANGINA PECTORIS: Primary | ICD-10-CM

## 2022-03-18 PROCEDURE — 99214 OFFICE O/P EST MOD 30 MIN: CPT | Performed by: INTERNAL MEDICINE

## 2022-03-18 NOTE — PROGRESS NOTES
[facility]      Patient Name: Dilip Verma  :1950  Age: 71 y.o.  Encounter Provider:  Amador Reyes Jr, MD      Chief Complaint: Follow-up coronary artery disease status post CABG 2019, chronic diastolic heart failure, COPD on nocturnal oxygen, BiPAP noncompliance, diabetes, hypertension and dyslipidemia      HPI  Dilip Verma is a 71 y.o. male with history of hypertension, hyperlipidemia, diabetes mellitus, coronary artery disease, congestive heart failure, thalamic hemorrhage, pulmonary hypertension, and obstructive sleep apnea.  He is here to establish care with me today.     He was admitted to [facility] in 2019 for syncope.  He was orthostatic with supine hypertension.  Clonidine was discontinued and hydralazine was increased.  He then presented to [facility] on 2019 with uncontrolled blood pressure and blurry vision.  He had an abnormal BNP and troponin.  Echocardiogram showed an ejection fraction of 53%, moderate left ventricular hypertrophy, mild to moderate left atrial enlargement and no significant valvular disease.  He had a Holter monitor which showed paroxysmal atrial tachycardia.  Due to his elevated troponin he underwent cardiac catheterization which showed normal ejection fraction with multivessel coronary artery disease. He underwent coronary artery bypass grafting with LIMA to LAD, vein graft to obtuse marginal 1, vein graft to distal right coronary artery. He was noted to have postoperative atrial fibrillation and was discharged on amiodarone.     He was readmitted in early 2019 with chest pain and non-STEMI.  Perfusion stress test suggested inferior wall and lateral wall ischemia. Heart catheterization completed 2019 showed widely patent grafts.      On 2020 he fell and was diagnosed with multiple femoral fractures.    He has undergone 2 separate surgical interventions for his leg which have left him immobilized  over the last few months.  He just got home from skilled nursing facility in has been able to bear weight for a long time.  This has been disruptive to monitoring fluid status at home and she noted that his waist increased in size significantly.  He is feeling more short of breath.  He has increased lower extremity edema.  No angina.  No dizziness or syncope.  Has been sleeping in a recliner.  He is not using his BiPAP as he feels that he needs oxygen at night and visit has been using nasal cannula.  He is currently on Lasix 80 mg in the morning and 40 mg at night.     Patient saw Danii Guerrier in April 2021 and volume status was not improved.  He was admitted to the hospital and diuresed well.  Volume status stable on current outpatient regimen.  Patient is increasing activity as tolerated.  He still very weak from the multiple hospitalizations with a lot of pain in the left leg.  He denies orthopnea or PND.  Lower extremity edema is much improved.    He has done well since then. Tolerating diuretics well.  Total hip replacement in January.  Uncomplicated surgery with ongoing recovery.  He is participating in physical therapy several times per week with no angina or heart failure.  He has left lower extremity edema which is chronic and stable.  No orthopnea or PND.  Last LDL 73.  Social and family history was reviewed and is not pertinent to this clinic visit.    The following portions of the patient's history were reviewed and updated as appropriate: allergies, current medications, past family history, past medical history, past social history, past surgical history and problem list.      Review of Systems   Constitutional: Negative for chills and fever.   HENT: Negative for hoarse voice and sore throat.    Eyes: Negative for double vision and photophobia.   Cardiovascular: Positive for leg swelling. Negative for chest pain, dyspnea on exertion, near-syncope, orthopnea, palpitations, paroxysmal nocturnal dyspnea and  "syncope.   Respiratory: Positive for shortness of breath. Negative for cough and wheezing.    Skin: Negative for poor wound healing and rash.   Musculoskeletal: Negative for arthritis and joint swelling.   Gastrointestinal: Negative for bloating, abdominal pain, hematemesis and hematochezia.   Neurological: Negative for dizziness and focal weakness.   Psychiatric/Behavioral: Negative for depression and suicidal ideas.     ROS was reviewed, updated and amended when necessary.    OBJECTIVE:   Vital Signs  Vitals:    03/18/22 1258   BP: 160/82   Pulse: 71     Estimated body mass index is 28.21 kg/m² as calculated from the following:    Height as of this encounter: 175.3 cm (69\").    Weight as of this encounter: 86.6 kg (191 lb).    Vitals reviewed.   Constitutional:       Appearance: Healthy appearance. Not in distress.   Neck:      Vascular: JVR present. JVD normal.   Pulmonary:      Effort: Pulmonary effort is normal.      Breath sounds: No wheezing. No rhonchi. No rales.   Chest:      Chest wall: Not tender to palpatation.   Cardiovascular:      PMI at left midclavicular line. Normal rate. Regular rhythm. Normal S1. Normal S2.      Murmurs: There is no murmur.      No gallop. No click. No rub.   Pulses:     Intact distal pulses.   Edema:     Peripheral edema present.     Pretibial: 1+ edema of the left pretibial area.     Ankle: 1+ edema of the left ankle.     Feet: 1+ edema of the left foot.  Abdominal:      General: Bowel sounds are normal.      Palpations: Abdomen is soft.      Tenderness: There is no abdominal tenderness.   Musculoskeletal: Normal range of motion.         General: No tenderness. Skin:     General: Skin is cool and dry.   Neurological:      General: No focal deficit present.      Mental Status: Alert and oriented to person, place and time.       Physical exam was reviewed, updated and amended when necessary.    Procedures          ASSESSMENT:     71-year-old male with multiple medical " comorbidities presents for worsening heart failure.    PLAN OF CARE:     1. Coronary artery disease status post coronary artery bypass grafting times 3 July 2019 with LIMA to LAD, vein graft to obtuse marginal 1, vein graft to distal right coronary artery with normal left ventricular systolic function.  Then non-STEMI and  inferolateral wall ischemia on perfusion stress test.  Cardiac catheterization 11/2019 showed patent grafts normal left ventricular systolic function.  No angina.  Continue aspirin and beta-blocker.  LDL is reasonably well controlled off of statin and he is reticent to starting any other medications at this time.  We will continue to monitor closely.  2.  Hypertension - elevated today in clinic.  Sodium restricted diet.  Twice daily blood pressure log to be sent to the office after 1 week.  3.  Hyperlipidemia- unclear why he is not on statin therapy  4.  Diabetes mellitus followed by PCP  5.  Obstructive sleep apnea still having difficulty tolerating BiPAP.  Follows with Dr. Cai   6.  Pulmonary hypertension  7.  Postoperative atrial fibrillation- no known recurrence  8.  Bilateral carotid artery stenosis mild on duplex July 2019.  Following with vascular surgery.  9.  Grade 3 diastolic dysfunction on echocardiogram July 2019  10. Remote history of thalamic hemorrhage noted on MRI July 2019  11. History of hypogonadism with prior testosterone replacement treatment.    12.  Chronic diastolic heart failure -blood pressure better controlled at home. Euvolemic today in clinic. He is using nocturnal oxygen and we will review pulmonary records. BNP is within normal range today. Echocardiogram in April with normal left ventricular ejection fraction. Continue same.  13. Chronic kidney disease follows with nephrology   14.  Diminished lower extremity pulses - abnormal AMINTA. Following with vascular surgery.    Vascular issues are stable.  Return to clinic 6 months.           Discharge Medications           Accurate as of March 18, 2022 12:59 PM. If you have any questions, ask your nurse or doctor.            Changes to Medications      Instructions Start Date   insulin lispro 100 UNIT/ML injection  Commonly known as: humaLOG  What changed: additional instructions   0-14 Units, Subcutaneous, 3 Times Daily Before Meals      sildenafil 20 MG tablet  Commonly known as: REVATIO  What changed: additional instructions   Take 1-3 tablets 1 hr prior to sexual activity         Continue These Medications      Instructions Start Date   AFRIN NASAL SPRAY NA   Nasal      aspirin 81 MG EC tablet   81 mg, Oral, Daily, HOLD PRIOR TO SURGERY PER MD INSTRUCTIONS      bumetanide 1 MG tablet  Commonly known as: BUMEX   1 mg, Oral, 2 Times Daily      Chlorhexidine Gluconate Cloth 2 % pads   Apply externally, Take As Directed, PRIOR TO SURGERY       HYDROcodone-acetaminophen 5-325 MG per tablet  Commonly known as: NORCO   1 tablet, Oral, Every 4 Hours PRN      Insulin Glargine (2 Unit Dial) 300 UNIT/ML solution pen-injector injection  Commonly known as: TOUJEO   36 Units, Subcutaneous, Daily      mupirocin 2 % nasal ointment  Commonly known as: BACTROBAN   Nasal, Take As Directed, PRIOR TO SURGERY       sennosides-docusate 8.6-50 MG per tablet  Commonly known as: PERICOLACE   2 tablets, Oral, 2 Times Daily PRN      spironolactone 25 MG tablet  Commonly known as: ALDACTONE   25 mg, Oral, Daily      testosterone 25 MG/2.5GM (1%) gel gel  Commonly known as: ANDROGEL   Transdermal, Daily      traMADol 50 MG tablet  Commonly known as: ULTRAM   50 mg, Oral, Every 8 Hours PRN      vitamin D3 125 MCG (5000 UT) capsule capsule   5,000 Units, Oral, Daily             Thank you for allowing me to participate in the care of your patient,      Sincerely,   Amador Reyes MD  [facility]  03/18/22  12:59 EDT

## 2022-04-11 ENCOUNTER — TELEPHONE (OUTPATIENT)
Dept: CARDIOLOGY | Facility: CLINIC | Age: 72
End: 2022-04-11

## 2022-04-11 ENCOUNTER — TELEPHONE (OUTPATIENT)
Dept: FAMILY MEDICINE CLINIC | Facility: CLINIC | Age: 72
End: 2022-04-11

## 2022-04-11 NOTE — TELEPHONE ENCOUNTER
Caller: [facility]     Relationship: Other    Best call back number: [phone]    What orders are you requesting (i.e. lab or imaging): E-STEM UNIT, NEEDS TO SAY WEAKNESS AND ATROPHY    In what timeframe would the patient need to come in: ASAP    Where will you receive your lab/imaging services: [facility]    Additional notes: FAX NUMBER  [phone]

## 2022-04-11 NOTE — TELEPHONE ENCOUNTER
2.  Hypertension - elevated today in clinic.  Sodium restricted diet.  Twice daily blood pressure log to be sent to the office after 1 week.      Pt brought in his list of blood pressure and heart rate readings    I have placed this in your in box for review

## 2022-04-12 ENCOUNTER — TELEPHONE (OUTPATIENT)
Dept: CARDIOLOGY | Facility: CLINIC | Age: 72
End: 2022-04-12

## 2022-04-12 NOTE — TELEPHONE ENCOUNTER
Spoke to wife about blood pressure log.  On average his blood pressure is well controlled.  We will continue same for now.

## 2022-04-14 ENCOUNTER — TRANSCRIBE ORDERS (OUTPATIENT)
Dept: ADMINISTRATIVE | Facility: HOSPITAL | Age: 72
End: 2022-04-14

## 2022-04-14 DIAGNOSIS — I73.9 PAD (PERIPHERAL ARTERY DISEASE): Primary | ICD-10-CM

## 2022-04-18 ENCOUNTER — READMISSION MANAGEMENT (OUTPATIENT)
Dept: CALL CENTER | Facility: HOSPITAL | Age: 72
End: 2022-04-18

## 2022-04-18 NOTE — OUTREACH NOTE
Total Joint Month 3 Survey    Flowsheet Row Responses   [facility] patient discharged from? [place]   Does the patient have one of the following disease processes/diagnoses(primary or secondary)? Total Joint Replacement   Joint surgery performed? Hip   Month 3 attempt successful? Yes   Call start time 1048   Call end time 1053   Has the patient been back in either the hospital or Emergency Department since discharge? No   Is the patient taking all medications as directed (includes completed medication regime)? Yes   Has the patient kept scheduled appointments due by today? Yes   Is the patient still receiving Home Health Services? No   Is the patient still attending therapy sessions(either in the home or as an outpatient)? Yes   Has the patient fallen since discharge? No   What is the patient's perception of their functional status since discharge? Improving   Graduated Yes   Is the patient interested in additional calls from an ambulatory ?  NOTE:  applies to high risk patients requiring additional follow-up. No   Did the patient feel the follow up calls were helpful during their recovery period? Yes   Wrap up additional comments Pt continues to improve. Pt always uses a walker. New braces are helping. Pt continues PT 3 x weekly.           NORA HILLIARD - Registered Nurse

## 2022-04-19 ENCOUNTER — APPOINTMENT (OUTPATIENT)
Dept: CT IMAGING | Facility: HOSPITAL | Age: 72
End: 2022-04-19

## 2022-04-19 ENCOUNTER — HOSPITAL ENCOUNTER (EMERGENCY)
Facility: HOSPITAL | Age: 72
Discharge: HOME | End: 2022-04-20
Attending: EMERGENCY MEDICINE | Admitting: EMERGENCY MEDICINE

## 2022-04-19 ENCOUNTER — APPOINTMENT (OUTPATIENT)
Dept: GENERAL RADIOLOGY | Facility: HOSPITAL | Age: 72
End: 2022-04-19

## 2022-04-19 DIAGNOSIS — R73.9 HYPERGLYCEMIA: ICD-10-CM

## 2022-04-19 DIAGNOSIS — R07.89 ATYPICAL CHEST PAIN: Primary | ICD-10-CM

## 2022-04-19 DIAGNOSIS — I10 HYPERTENSION, UNSPECIFIED TYPE: ICD-10-CM

## 2022-04-19 LAB
ALBUMIN SERPL-MCNC: 3.5 G/DL (ref 3.5–5.2)
ALBUMIN/GLOB SERPL: 1.1 G/DL
ALP SERPL-CCNC: 90 U/L (ref 39–117)
ALT SERPL W P-5'-P-CCNC: <5 U/L (ref 1–41)
ANION GAP SERPL CALCULATED.3IONS-SCNC: 9.7 MMOL/L (ref 5–15)
APTT PPP: 28.6 SECONDS (ref 22.7–35.4)
AST SERPL-CCNC: <5 U/L (ref 1–40)
BASOPHILS # BLD AUTO: 0.04 10*3/MM3 (ref 0–0.2)
BASOPHILS NFR BLD AUTO: 0.5 % (ref 0–1.5)
BILIRUB SERPL-MCNC: 0.4 MG/DL (ref 0–1.2)
BUN SERPL-MCNC: 27 MG/DL (ref 8–23)
BUN/CREAT SERPL: 25.2 (ref 7–25)
CALCIUM SPEC-SCNC: 8.8 MG/DL (ref 8.6–10.5)
CHLORIDE SERPL-SCNC: 102 MMOL/L (ref 98–107)
CO2 SERPL-SCNC: 27.3 MMOL/L (ref 22–29)
CREAT SERPL-MCNC: 1.07 MG/DL (ref 0.76–1.27)
D DIMER PPP FEU-MCNC: 1.14 MCGFEU/ML (ref 0–0.49)
DEPRECATED RDW RBC AUTO: 43 FL (ref 37–54)
EGFRCR SERPLBLD CKD-EPI 2021: 73.3 ML/MIN/1.73
EOSINOPHIL # BLD AUTO: 0.27 10*3/MM3 (ref 0–0.4)
EOSINOPHIL NFR BLD AUTO: 3.1 % (ref 0.3–6.2)
ERYTHROCYTE [DISTWIDTH] IN BLOOD BY AUTOMATED COUNT: 13.1 % (ref 12.3–15.4)
GLOBULIN UR ELPH-MCNC: 3.2 GM/DL
GLUCOSE SERPL-MCNC: 193 MG/DL (ref 65–99)
HCT VFR BLD AUTO: 43.9 % (ref 37.5–51)
HGB BLD-MCNC: 15 G/DL (ref 13–17.7)
HOLD SPECIMEN: NORMAL
HOLD SPECIMEN: NORMAL
IMM GRANULOCYTES # BLD AUTO: 0.03 10*3/MM3 (ref 0–0.05)
IMM GRANULOCYTES NFR BLD AUTO: 0.3 % (ref 0–0.5)
INR PPP: 1.05 (ref 0.9–1.1)
LYMPHOCYTES # BLD AUTO: 1.43 10*3/MM3 (ref 0.7–3.1)
LYMPHOCYTES NFR BLD AUTO: 16.5 % (ref 19.6–45.3)
MAGNESIUM SERPL-MCNC: 2.3 MG/DL (ref 1.6–2.4)
MCH RBC QN AUTO: 30.9 PG (ref 26.6–33)
MCHC RBC AUTO-ENTMCNC: 34.2 G/DL (ref 31.5–35.7)
MCV RBC AUTO: 90.5 FL (ref 79–97)
MONOCYTES # BLD AUTO: 0.64 10*3/MM3 (ref 0.1–0.9)
MONOCYTES NFR BLD AUTO: 7.4 % (ref 5–12)
NEUTROPHILS NFR BLD AUTO: 6.26 10*3/MM3 (ref 1.7–7)
NEUTROPHILS NFR BLD AUTO: 72.2 % (ref 42.7–76)
NRBC BLD AUTO-RTO: 0 /100 WBC (ref 0–0.2)
PLATELET # BLD AUTO: 200 10*3/MM3 (ref 140–450)
PMV BLD AUTO: 10.4 FL (ref 6–12)
POTASSIUM SERPL-SCNC: 4.4 MMOL/L (ref 3.5–5.2)
PROT SERPL-MCNC: 6.7 G/DL (ref 6–8.5)
PROTHROMBIN TIME: 13.6 SECONDS (ref 11.7–14.2)
RBC # BLD AUTO: 4.85 10*6/MM3 (ref 4.14–5.8)
SODIUM SERPL-SCNC: 139 MMOL/L (ref 136–145)
TROPONIN T SERPL-MCNC: 0.03 NG/ML (ref 0–0.03)
WBC NRBC COR # BLD: 8.67 10*3/MM3 (ref 3.4–10.8)
WHOLE BLOOD HOLD SPECIMEN: NORMAL
WHOLE BLOOD HOLD SPECIMEN: NORMAL

## 2022-04-19 PROCEDURE — 93010 ELECTROCARDIOGRAM REPORT: CPT | Performed by: INTERNAL MEDICINE

## 2022-04-19 PROCEDURE — 83735 ASSAY OF MAGNESIUM: CPT | Performed by: EMERGENCY MEDICINE

## 2022-04-19 PROCEDURE — 36415 COLL VENOUS BLD VENIPUNCTURE: CPT

## 2022-04-19 PROCEDURE — 93005 ELECTROCARDIOGRAM TRACING: CPT

## 2022-04-19 PROCEDURE — 80053 COMPREHEN METABOLIC PANEL: CPT | Performed by: EMERGENCY MEDICINE

## 2022-04-19 PROCEDURE — 85610 PROTHROMBIN TIME: CPT | Performed by: EMERGENCY MEDICINE

## 2022-04-19 PROCEDURE — 25010000002 HYDRALAZINE PER 20 MG: Performed by: EMERGENCY MEDICINE

## 2022-04-19 PROCEDURE — 99284 EMERGENCY DEPT VISIT MOD MDM: CPT

## 2022-04-19 PROCEDURE — 85730 THROMBOPLASTIN TIME PARTIAL: CPT | Performed by: EMERGENCY MEDICINE

## 2022-04-19 PROCEDURE — 84484 ASSAY OF TROPONIN QUANT: CPT | Performed by: EMERGENCY MEDICINE

## 2022-04-19 PROCEDURE — 0 IOPAMIDOL PER 1 ML: Performed by: EMERGENCY MEDICINE

## 2022-04-19 PROCEDURE — 93005 ELECTROCARDIOGRAM TRACING: CPT | Performed by: EMERGENCY MEDICINE

## 2022-04-19 PROCEDURE — 85379 FIBRIN DEGRADATION QUANT: CPT | Performed by: EMERGENCY MEDICINE

## 2022-04-19 PROCEDURE — 71275 CT ANGIOGRAPHY CHEST: CPT

## 2022-04-19 PROCEDURE — 71045 X-RAY EXAM CHEST 1 VIEW: CPT

## 2022-04-19 PROCEDURE — 96374 THER/PROPH/DIAG INJ IV PUSH: CPT

## 2022-04-19 PROCEDURE — 85025 COMPLETE CBC W/AUTO DIFF WBC: CPT | Performed by: EMERGENCY MEDICINE

## 2022-04-19 PROCEDURE — 96361 HYDRATE IV INFUSION ADD-ON: CPT

## 2022-04-19 RX ORDER — CLONIDINE HYDROCHLORIDE 0.1 MG/1
0.1 TABLET ORAL ONCE
Status: COMPLETED | OUTPATIENT
Start: 2022-04-19 | End: 2022-04-19

## 2022-04-19 RX ORDER — SODIUM CHLORIDE 0.9 % (FLUSH) 0.9 %
10 SYRINGE (ML) INJECTION AS NEEDED
Status: DISCONTINUED | OUTPATIENT
Start: 2022-04-19 | End: 2022-04-20 | Stop reason: HOSPADM

## 2022-04-19 RX ORDER — HYDRALAZINE HYDROCHLORIDE 20 MG/ML
10 INJECTION INTRAMUSCULAR; INTRAVENOUS ONCE
Status: COMPLETED | OUTPATIENT
Start: 2022-04-19 | End: 2022-04-19

## 2022-04-19 RX ADMIN — IOPAMIDOL 85 ML: 755 INJECTION, SOLUTION INTRAVENOUS at 23:40

## 2022-04-19 RX ADMIN — SODIUM CHLORIDE 500 ML: 9 INJECTION, SOLUTION INTRAVENOUS at 22:11

## 2022-04-19 RX ADMIN — HYDRALAZINE HYDROCHLORIDE 10 MG: 20 INJECTION INTRAMUSCULAR; INTRAVENOUS at 21:39

## 2022-04-19 RX ADMIN — CLONIDINE HYDROCHLORIDE 0.1 MG: 0.1 TABLET ORAL at 23:56

## 2022-04-19 NOTE — ED NOTES
Pt being sent by urgent care for chest pain x3days. Pt was given asa at urgent care and is reported to have ST changes on ekg

## 2022-04-20 ENCOUNTER — TELEPHONE (OUTPATIENT)
Dept: FAMILY MEDICINE CLINIC | Facility: CLINIC | Age: 72
End: 2022-04-20

## 2022-04-20 VITALS
TEMPERATURE: 97.5 F | SYSTOLIC BLOOD PRESSURE: 130 MMHG | OXYGEN SATURATION: 93 % | DIASTOLIC BLOOD PRESSURE: 77 MMHG | RESPIRATION RATE: 18 BRPM | HEART RATE: 74 BPM

## 2022-04-20 LAB
QT INTERVAL: 367 MS
QT INTERVAL: 419 MS
TROPONIN T SERPL-MCNC: 0.03 NG/ML (ref 0–0.03)

## 2022-04-20 RX ORDER — PANTOPRAZOLE SODIUM 40 MG/1
40 TABLET, DELAYED RELEASE ORAL DAILY
Qty: 14 TABLET | Refills: 0 | Status: SHIPPED | OUTPATIENT
Start: 2022-04-20 | End: 2022-05-04

## 2022-04-20 RX ORDER — SUCRALFATE 1 G/1
1 TABLET ORAL 4 TIMES DAILY
Qty: 20 TABLET | Refills: 0 | Status: SHIPPED | OUTPATIENT
Start: 2022-04-20 | End: 2022-09-19

## 2022-04-20 NOTE — ED PROVIDER NOTES
EMERGENCY DEPARTMENT ENCOUNTER  I wore full protective equipment throughout this patient encounter including a N95 mask, eye shield, gown and gloves. Hand hygiene was performed before donning protective equipment and after removal when leaving the room.    Room Number:  21/21  Date of encounter:  4/20/2022  PCP: Dakota Avila MD    HPI:  Context: Dilip Verma is a 72 y.o. male who presents to the ED c/o chief complaint of chest pain.  Patient complains of intermittent chest pain for the last 3 days.  Patient reports that pain is central, sternal, diffuse.  Patient has difficulty describing it but then states that it is a burning sensation.  Patient reports pain is coming and going, denies any inciting events.  Patient denies any aggravating or ameliorating factors.  Patient denies any associated shortness of breath, no nausea vomiting, no diaphoresis.  Patient denies any radiation of the pain to his arms or extremities.  Patient reports that he was having chest pain when he called EMS but chest pain resolved while in route.  Patient did receive aspirin, no nitroglycerin.  Patient denies any chest pain at present.  Patient does report history of coronary artery disease, followed at this hospital, reports that he had testing a few months ago but believes it was an MRI for his heart.    MEDICAL HISTORY REVIEW  Reviewed in EPIC    PAST MEDICAL HISTORY  Active Ambulatory Problems     Diagnosis Date Noted   • Anxiety    • Colon polyp    • Type 2 diabetes mellitus with hyperglycemia, with long-term current use of insulin ([facility])    • Erectile dysfunction    • Hyperlipidemia    • CAD (coronary artery disease) 07/20/2019   • Hx of CABG 08/19/2019   • Acute on chronic diastolic CHF (congestive heart failure) (HCC) 09/11/2019   • Hypersomnia due to medical condition 09/14/2019   • CSA (central sleep apnea) 09/14/2019   • Periodic breathing 09/14/2019   • HTN (hypertension) 02/26/2020   • History of stroke 12/01/2020   • CKD  (chronic kidney disease) stage 3, GFR 30-59 ml/min ([facility]) 12/01/2020   • Urinary retention 12/02/2020   • Acute on chronic renal failure ([facility]) 12/03/2020   • Acute on chronic diastolic (congestive) heart failure ([facility]) 04/12/2021   • Bacteriuria 04/16/2021   • Rectal pain 04/16/2021   • Status post total replacement of hip 01/11/2022     Resolved Ambulatory Problems     Diagnosis Date Noted   • NSTEMI (non-ST elevated myocardial infarction) ([facility]) 07/12/2019   • Orthostasis 07/20/2019   • Closed nondisplaced intertrochanteric fracture of left femur ([facility]) 12/01/2020   • Acute posthemorrhagic anemia 12/04/2020   • Complicated UTI (urinary tract infection) 12/16/2020     Past Medical History:   Diagnosis Date   • AMENA (acute kidney injury) ([facility]) 12/3/2020   • Balance disorder    • Chronic diastolic (congestive) heart failure ([facility])    • Chronic kidney disease    • Closed left subtrochanteric femur fracture, with nonunion, subsequent encounter    • Contracture, right hand    • Coronary artery disease    • Diabetes mellitus, type II ([facility])    • H/O bone density study never   • History of sepsis    • Hypersomnia    • Hypertension    • Kidney stone    • Sleep apnea    • Spinal stenosis in cervical region    • Stroke ([facility]) 2012       PAST SURGICAL HISTORY  Past Surgical History:   Procedure Laterality Date   • CARDIAC CATHETERIZATION N/A 7/15/2019    Procedure: Coronary angiography;  Surgeon: Carrie Price MD;  Location: [facility] CATH INVASIVE LOCATION;  Service: Cardiovascular   • CARDIAC CATHETERIZATION N/A 7/15/2019    Procedure: Left Heart Cath;  Surgeon: Carrie Price MD;  Location: [facility] CATH INVASIVE LOCATION;  Service: Cardiovascular   • CARDIAC CATHETERIZATION N/A 7/15/2019    Procedure: Left ventriculography;  Surgeon: Carrie Price MD;  Location: [facility] CATH INVASIVE LOCATION;  Service: Cardiovascular   • CARDIAC CATHETERIZATION  7/15/2019    Procedure: Functional Flow [place];  Surgeon: Carrie Price MD;   Location: [facility] CATH INVASIVE LOCATION;  Service: Cardiovascular   • CARDIAC CATHETERIZATION N/A 11/6/2019    Procedure: Right and Left Heart Cath;  Surgeon: Mya Smith MD;  Location: [facility] CATH INVASIVE LOCATION;  Service: Cardiovascular   • CARDIAC CATHETERIZATION N/A 11/6/2019    Procedure: Coronary angiography;  Surgeon: Mya Smith MD;  Location: [facility] CATH INVASIVE LOCATION;  Service: Cardiovascular   • CATARACT EXTRACTION EXTRACAPSULAR W/ INTRAOCULAR LENS IMPLANTATION Bilateral    • COLONOSCOPY  01/2015    dr [email or web address]   • CORONARY ARTERY BYPASS GRAFT N/A 7/18/2019    Procedure: INTRAOPERATIVE SHOAIB; STERNOTOMY CORONARY ARTERY BYPASS x 3  USING LEFT INTERNAL MAMMARY ARTERY GRAFT UTILIZING ENDOSCOPICALLY HARVESTED RIGHT GREATER SAPHENOUS VEIN AND PRP.;  Surgeon: Bill Devi MD;  Location: [facility] MAIN OR;  Service: Cardiothoracic   • DENTAL PROCEDURE      3 surgeries inder implants   • FEMUR IM NAILING/RODDING Left 12/3/2020    Procedure: LEFT HIP INTRAMEDULLARY NAIL;  Surgeon: Niraj Ravi MD;  Location: [facility] MAIN OR;  Service: Orthopedic Spine;  Laterality: Left;   • HERNIA REPAIR      inguinal bilaterally   • KIDNEY STONE SURGERY      lithotripsy   • TOTAL HIP ARTHROPLASTY REVISION Left 1/11/2022    Procedure: TOTAL HIP ARTHROPLASTY REVISION- POSTERIOR;  Surgeon: Jurgen Candelaria II, MD;  Location: [facility] MAIN OR;  Service: Orthopedics;  Laterality: Left;       FAMILY HISTORY  Family History   Problem Relation Age of Onset   • Depression Mother    • Cancer Mother         uterine   • Arrhythmia Mother    • Heart disease Father    • Hypertension Father    • Kidney disease Father    • Thyroid disease Father    • Depression Sister    • Alcohol abuse Brother    • Alcohol abuse Other    • Alcohol abuse Other    • Lung disease Other    • Thyroid disease Other    • Glaucoma Other    • Heart attack Other    • Stroke Maternal Grandmother    • Stroke Paternal Grandmother    • Malig Hyperthermia  Neg Hx        SOCIAL HISTORY  Social History     Socioeconomic History   • Marital status:    Tobacco Use   • Smoking status: Former Smoker     Packs/day: 0.75     Years: 16.00     Pack years: 12.00     Quit date:      Years since quittin.3   • Smokeless tobacco: Never Used   • Tobacco comment: Start age:39/Stopping age:47, 3/4 PPD   Vaping Use   • Vaping Use: Never used   Substance and Sexual Activity   • Alcohol use: Not Currently     Comment: alcohol abuse, none x 25 years / caffeine use - 1 cup daily   • Drug use: No   • Sexual activity: Defer       ALLERGIES  Ace inhibitors, Seroquel [quetiapine], Amlodipine, Ativan [lorazepam], Xanax [alprazolam], Minoxidil, and Tetracycline    The patient's allergies have been reviewed    REVIEW OF SYSTEMS  All systems reviewed and negative except for those discussed in HPI.     PHYSICAL EXAM  I have reviewed the triage vital signs and nursing notes.  ED Triage Vitals [22]   Temp Heart Rate Resp BP SpO2   97.5 °F (36.4 °C) 80 18 (!) 217/106 97 %      Temp src Heart Rate Source Patient Position BP Location FiO2 (%)   -- Monitor -- -- --       General: No acute distress.  HENT: NCAT, PERRL, Nares patent.  Eyes: no scleral icterus.  Neck: trachea midline, no ROM limitations.  CV: regular rhythm, regular rate.  Respiratory: normal effort, CTAB.  Abdomen: soft, nondistended, NTTP, no rebound tenderness, no guarding or rigidity.  Musculoskeletal: no deformity.  Neuro: alert, moves all extremities, follows commands.  Skin: warm, dry.    LAB RESULTS  Recent Results (from the past 24 hour(s))   ECG 12 Lead    Collection Time: 22  8:59 PM   Result Value Ref Range    QT Interval 419 ms   ECG 12 Lead    Collection Time: 22  9:19 PM   Result Value Ref Range    QT Interval 367 ms   Protime-INR    Collection Time: 22  9:32 PM    Specimen: Blood   Result Value Ref Range    Protime 13.6 11.7 - 14.2 Seconds    INR 1.05 0.90 - 1.10   aPTT     Collection Time: 04/19/22  9:32 PM    Specimen: Blood   Result Value Ref Range    PTT 28.6 22.7 - 35.4 seconds   Magnesium    Collection Time: 04/19/22  9:32 PM    Specimen: Blood   Result Value Ref Range    Magnesium 2.3 1.6 - 2.4 mg/dL   Green Top (Gel)    Collection Time: 04/19/22  9:32 PM   Result Value Ref Range    Extra Tube Hold for add-ons.    Lavender Top    Collection Time: 04/19/22  9:32 PM   Result Value Ref Range    Extra Tube hold for add-on    Gold Top - SST    Collection Time: 04/19/22  9:32 PM   Result Value Ref Range    Extra Tube Hold for add-ons.    Light Blue Top    Collection Time: 04/19/22  9:32 PM   Result Value Ref Range    Extra Tube hold for add-on    CBC Auto Differential    Collection Time: 04/19/22  9:32 PM    Specimen: Blood   Result Value Ref Range    WBC 8.67 3.40 - 10.80 10*3/mm3    RBC 4.85 4.14 - 5.80 10*6/mm3    Hemoglobin 15.0 13.0 - 17.7 g/dL    Hematocrit 43.9 37.5 - 51.0 %    MCV 90.5 79.0 - 97.0 fL    MCH 30.9 26.6 - 33.0 pg    MCHC 34.2 31.5 - 35.7 g/dL    RDW 13.1 12.3 - 15.4 %    RDW-SD 43.0 37.0 - 54.0 fl    MPV 10.4 6.0 - 12.0 fL    Platelets 200 140 - 450 10*3/mm3    Neutrophil % 72.2 42.7 - 76.0 %    Lymphocyte % 16.5 (L) 19.6 - 45.3 %    Monocyte % 7.4 5.0 - 12.0 %    Eosinophil % 3.1 0.3 - 6.2 %    Basophil % 0.5 0.0 - 1.5 %    Immature Grans % 0.3 0.0 - 0.5 %    Neutrophils, Absolute 6.26 1.70 - 7.00 10*3/mm3    Lymphocytes, Absolute 1.43 0.70 - 3.10 10*3/mm3    Monocytes, Absolute 0.64 0.10 - 0.90 10*3/mm3    Eosinophils, Absolute 0.27 0.00 - 0.40 10*3/mm3    Basophils, Absolute 0.04 0.00 - 0.20 10*3/mm3    Immature Grans, Absolute 0.03 0.00 - 0.05 10*3/mm3    nRBC 0.0 0.0 - 0.2 /100 WBC   D-dimer, Quantitative    Collection Time: 04/19/22  9:32 PM    Specimen: Blood   Result Value Ref Range    D-Dimer, Quantitative 1.14 (H) 0.00 - 0.49 MCGFEU/mL   Comprehensive Metabolic Panel    Collection Time: 04/19/22 10:10 PM    Specimen: Blood   Result Value Ref Range     Glucose 193 (H) 65 - 99 mg/dL    BUN 27 (H) 8 - 23 mg/dL    Creatinine 1.07 0.76 - 1.27 mg/dL    Sodium 139 136 - 145 mmol/L    Potassium 4.4 3.5 - 5.2 mmol/L    Chloride 102 98 - 107 mmol/L    CO2 27.3 22.0 - 29.0 mmol/L    Calcium 8.8 8.6 - 10.5 mg/dL    Total Protein 6.7 6.0 - 8.5 g/dL    Albumin 3.50 3.50 - 5.20 g/dL    ALT (SGPT) <5 1 - 41 U/L    AST (SGOT) <5 1 - 40 U/L    Alkaline Phosphatase 90 39 - 117 U/L    Total Bilirubin 0.4 0.0 - 1.2 mg/dL    Globulin 3.2 gm/dL    A/G Ratio 1.1 g/dL    BUN/Creatinine Ratio 25.2 (H) 7.0 - 25.0    Anion Gap 9.7 5.0 - 15.0 mmol/L    eGFR 73.3 >60.0 mL/min/1.73   Troponin    Collection Time: 04/19/22 10:10 PM    Specimen: Blood   Result Value Ref Range    Troponin T 0.026 0.000 - 0.030 ng/mL   Troponin    Collection Time: 04/19/22 11:59 PM    Specimen: Blood   Result Value Ref Range    Troponin T 0.026 0.000 - 0.030 ng/mL       I ordered the above labs and reviewed the results.    RADIOLOGY  XR Chest 1 View    Result Date: 4/19/2022  SINGLE VIEW OF THE CHEST  HISTORY: Chest pain  COMPARISON: 01/07/2022  FINDINGS: Heart size is within normal limits for technique. Patient is status post median sternotomy with coronary artery bypass grafting. No pneumothorax or pleural effusion is seen. The patient has bibasilar scarring. No definite acute infiltrates are seen.      No acute findings.  This report was finalized on 4/19/2022 9:31 PM by Dr. Gayle Okeefe M.D.      CT Angiogram Chest    Result Date: 4/20/2022  CT ANGIOGRAM OF THE CHEST  HISTORY: Pain  COMPARISON: None available.  TECHNIQUE: Axial CT imaging was obtained through the thorax. IV contrast was administered. Three-D reformatted images were obtained.  FINDINGS: No acute pulmonary thromboembolus is seen. Thoracic aorta is normal in caliber. There is no evidence of dissection. There are extensive coronary artery calcifications. Main pulmonary artery is enlarged, which can be seen in the setting of pulmonary arterial  hypertension. The thyroid gland and trachea, esophagus appear unremarkable. There is no pleural or pericardial effusion. Mediastinal lymph nodes do not appear pathologically enlarged. There is bibasilar scarring. Images through the upper abdomen do not demonstrate any acute abnormalities. Pancreatic parenchymal calcifications are again seen. No acute osseous abnormalities are identified.      No acute intrathoracic findings.  Radiation dose reduction techniques were utilized, including automated exposure control and exposure modulation based on body size.  This report was finalized on 4/20/2022 12:20 AM by Dr. Gayle Okeefe M.D.        I ordered the above noted radiological studies. I reviewed the images and results. I agree with the radiologist interpretation.    PROCEDURES  Procedures    MEDICATIONS GIVEN IN ER  Medications   sodium chloride 0.9 % flush 10 mL (has no administration in time range)   hydrALAZINE (APRESOLINE) injection 10 mg (10 mg Intravenous Given 4/19/22 2139)   sodium chloride 0.9 % bolus 500 mL (0 mL Intravenous Stopped 4/19/22 2245)   iopamidol (ISOVUE-370) 76 % injection 100 mL (85 mL Intravenous Given by Other 4/19/22 2340)   cloNIDine (CATAPRES) tablet 0.1 mg (0.1 mg Oral Given 4/19/22 8776)       PROGRESS, DATA ANALYSIS, CONSULTS, AND MEDICAL DECISION MAKING  A complete history and physical exam have been performed.  All available laboratory and imaging results have been reviewed by myself prior to disposition.    MDM  After the initial H&P, I discussed pertinent information from history and physical exam with patient/family.  Discussed differential diagnosis.  Discussed plan for ED evaluation/workup/treatment.  All questions answered.  Patient/family is agreeable with plan.  ED Course as of 04/20/22 0214   Tue Apr 19, 2022   2100 My differential diagnosis for chest pain includes but is not limited to:  Muscle strain, costochondritis, myositis, pleurisy, rib fracture, intercostal  neuritis, herpes zoster, tumor, myocardial infarction, coronary syndrome, unstable angina, angina, aortic dissection, mitral valve prolapse, pericarditis, palpitations, pulmonary embolus, pneumonia, pneumothorax, lung cancer, GERD, esophagitis, esophageal spasm     [JG]   2108 EKG independently viewed and contemporaneously interpreted by ED physician. Time: 8:59 PM.  Rate 81.  Interpretation: Normal sinus rhythm, right axis deviation, right bundle branch block, LVH, ST depression with T wave inversion in V4 through V6, leads I II III and aVF. [JG]   2134 Repeat EKG obtained.  EKG independently viewed and contemporaneously interpreted by ED physician. Time: 9:19 PM.  Rate 82.  Interpretation: Normal sinus rhythm, right axis deviation, left atrial enlargement, right bundle branch block, LVH, ST depression with T wave inversion and inferior leads in lead I, ST depression with biphasic T waves in leads V4 and V5, ST depression with T wave inversion in V6.  No ST elevation. [JG]   2201 Dimer elevated, obtaining CT angiogram to rule out pulmonary embolism. [JG]   Wed Apr 20, 2022   0025 CTA negative for pulmonary embolism. [JG]   0138 Chest pain is extremely atypical, is not exertional, does not radiate, no associated signs or symptoms.  Serial troponins are negative, CTA negative.  Symptoms more consistent with acid reflux as it is positional, worsened with laying back, improved with sitting.  Patient has remained asymptomatic throughout ED stay.  Discussed ED work-up and results with patient, discussed plan for discharge on acid medication with primary care and cardiology follow-up.  Patient and family comfortable with plan, no questions or concerns. [JG]   0140 The patient was reexamined.  They have had symptomatic improvement during their ED stay.  I discussed today's findings with the patient, explaining the pertinent positives and negatives from today's visit, and the plan of care.  Discussed plan for discharge as  there is no emergent indication for admission.  Discussed limitation of the ED work-up and that this is to rule out life-threatening emergencies but that they could require further testing as determined by their primary care and or any referred specialist patient is agreeable and understands need for follow-up and repeat exam/testing.  Patient is aware that discharge does not mean there is nothing wrong, indicates no emergency is present, and that they must continue their care with their primary care physician and/or any referred specialist.  They were given appropriate follow-up with their primary care physician and/or specialist.  I had an extensive discussion on the expected clinical course and return precautions.  Patient understands to return to the emergency department for continuation, worsening, or new symptoms.  I answered any of the patient's questions. Patient was discharged home in a stable condition.     [JG]      ED Course User Index  [JG] Gordo Gomez MD       AS OF 02:14 EDT VITALS:    BP - 130/77  HR - 74  TEMP - 97.5 °F (36.4 °C)  O2 SATS - 93%    DIAGNOSIS  Final diagnoses:   Atypical chest pain   Hypertension, unspecified type   Hyperglycemia         DISPOSITION  DISCHARGE    Patient discharged in stable condition.    Reviewed implications of results, diagnosis, meds, responsibility to follow up, warning signs and symptoms of possible worsening, potential complications and reasons to return to ER.    Patient/Family voiced understanding of above instructions.    Discussed plan for discharge, as there is no emergent indication for admission. Patient referred to primary care provider for BP management due to today's BP. Pt/family is agreeable and understands need for follow up and repeat testing.  Pt is aware that discharge does not mean that nothing is wrong but it indicates no emergency is present that requires admission and they must continue care with follow-up as given below or physician  of their choice.     FOLLOW-UP  Dakota Avila MD  [address] [facility] [id number] [phone]    Schedule an appointment as soon as possible for a visit in 2 days  even if well    Amador Reyes Jr., MD  [address] [facility] [address]  [place] [id number] [phone]    Schedule an appointment as soon as possible for a visit in 2 days           Medication List      New Prescriptions    pantoprazole 40 MG EC tablet  Commonly known as: PROTONIX  Take 1 tablet by mouth Daily for 14 days.     sucralfate 1 g tablet  Commonly known as: CARAFATE  Take 1 tablet by mouth 4 (Four) Times a Day.        Changed    insulin lispro 100 UNIT/ML injection  Commonly known as: humaLOG  Inject 0-14 Units under the skin into the appropriate area as directed 3 (Three) Times a Day Before Meals.  What changed: additional instructions     sildenafil 20 MG tablet  Commonly known as: REVATIO  Take 1-3 tablets 1 hr prior to sexual activity  What changed: additional instructions           Where to Get Your Medications      These medications were sent to [id number] - [place], [place] - [address] - [phone]  - [phone] FX  [address] [address]    Phone: [phone]   · pantoprazole 40 MG EC tablet  · sucralfate 1 g tablet          Gordo Gomez MD  04/20/22 0351

## 2022-04-20 NOTE — ED TRIAGE NOTES
Pt was brought in by ems from urgent care for sternal chest pain x3days. Pain worse while supine. Pt given 324mg asa by urgent care.     Pt wearing mask on arrival. Staff wearing mask and goggles at time of triage.

## 2022-04-20 NOTE — TELEPHONE ENCOUNTER
Caller: Beny Verma    Relationship to patient: Emergency Contact    Best call back number: [phone]    New or established patient?  [] New  [x] Established    Date of discharge: 4/19     Facility discharged from: [facility]    Diagnosis/Symptoms: INDIGESTION- CHEST PAINS    Length of stay (If applicable): A FEW HOURS    Specialty Only: Did you see a [facility] provider?    [x] Yes  [] No  If so, who? DR. NAVID SMITH    PLEASE REVIEW AND ADVISE IF NECESSARY.  PATIENT IS DOING WELL NOW.

## 2022-04-28 ENCOUNTER — TELEPHONE (OUTPATIENT)
Dept: FAMILY MEDICINE CLINIC | Facility: CLINIC | Age: 72
End: 2022-04-28

## 2022-05-18 ENCOUNTER — HOSPITAL ENCOUNTER (OUTPATIENT)
Dept: CARDIOLOGY | Facility: HOSPITAL | Age: 72
Discharge: HOME | End: 2022-05-18
Admitting: SURGERY

## 2022-05-18 DIAGNOSIS — I73.9 PAD (PERIPHERAL ARTERY DISEASE): ICD-10-CM

## 2022-05-18 LAB
BH CV LOWER ARTERIAL LEFT ABI RATIO: 1.3
BH CV LOWER ARTERIAL LEFT DORSALIS PEDIS SYS MAX: 197
BH CV LOWER ARTERIAL LEFT GREAT TOE SYS MAX: 84
BH CV LOWER ARTERIAL LEFT LOW THIGH SYS MAX: NORMAL
BH CV LOWER ARTERIAL LEFT POPLITEAL SYS MAX: NORMAL
BH CV LOWER ARTERIAL LEFT POST TIBIAL SYS MAX: 174
BH CV LOWER ARTERIAL LEFT TBI RATIO: 0.55
BH CV LOWER ARTERIAL RIGHT ABI RATIO: 1
BH CV LOWER ARTERIAL RIGHT DORSALIS PEDIS SYS MAX: 152
BH CV LOWER ARTERIAL RIGHT GREAT TOE SYS MAX: 87
BH CV LOWER ARTERIAL RIGHT HIGH THIGH SYS MAX: NORMAL
BH CV LOWER ARTERIAL RIGHT LOW THIGH SYS MAX: NORMAL
BH CV LOWER ARTERIAL RIGHT POPLITEAL SYS MAX: 182
BH CV LOWER ARTERIAL RIGHT POST TIBIAL SYS MAX: 120
BH CV LOWER ARTERIAL RIGHT TBI RATIO: 0.57
MAXIMAL PREDICTED HEART RATE: 147 BPM
STRESS TARGET HR: 125 BPM
UPPER ARTERIAL LEFT ARM BRACHIAL SYS MAX: 152 MMHG
UPPER ARTERIAL RIGHT ARM BRACHIAL SYS MAX: 147 MMHG

## 2022-05-18 PROCEDURE — 93923 UPR/LXTR ART STDY 3+ LVLS: CPT

## 2022-06-10 DIAGNOSIS — E78.5 HYPERLIPIDEMIA, UNSPECIFIED HYPERLIPIDEMIA TYPE: ICD-10-CM

## 2022-06-10 DIAGNOSIS — Z00.00 MEDICARE ANNUAL WELLNESS VISIT, SUBSEQUENT: ICD-10-CM

## 2022-06-10 DIAGNOSIS — Z79.4 TYPE 2 DIABETES MELLITUS WITH HYPERGLYCEMIA, WITH LONG-TERM CURRENT USE OF INSULIN: ICD-10-CM

## 2022-06-10 DIAGNOSIS — N18.30 STAGE 3 CHRONIC KIDNEY DISEASE, UNSPECIFIED WHETHER STAGE 3A OR 3B CKD: ICD-10-CM

## 2022-06-10 DIAGNOSIS — M25.552 LEFT HIP PAIN: ICD-10-CM

## 2022-06-10 DIAGNOSIS — I10 ESSENTIAL HYPERTENSION: ICD-10-CM

## 2022-06-10 DIAGNOSIS — F41.9 ANXIETY: ICD-10-CM

## 2022-06-10 DIAGNOSIS — E11.65 TYPE 2 DIABETES MELLITUS WITH HYPERGLYCEMIA, WITH LONG-TERM CURRENT USE OF INSULIN: ICD-10-CM

## 2022-06-10 DIAGNOSIS — I25.10 CORONARY ARTERY DISEASE INVOLVING NATIVE CORONARY ARTERY OF NATIVE HEART WITHOUT ANGINA PECTORIS: ICD-10-CM

## 2022-06-10 DIAGNOSIS — F43.10 PTSD (POST-TRAUMATIC STRESS DISORDER): ICD-10-CM

## 2022-06-10 DIAGNOSIS — Z95.1 HX OF CABG: ICD-10-CM

## 2022-06-10 DIAGNOSIS — Z86.73 HISTORY OF STROKE: ICD-10-CM

## 2022-06-10 DIAGNOSIS — G47.00 INSOMNIA, UNSPECIFIED TYPE: ICD-10-CM

## 2022-06-10 RX ORDER — TRAMADOL HYDROCHLORIDE 50 MG/1
50 TABLET ORAL EVERY 8 HOURS PRN
Qty: 40 TABLET | Refills: 3 | Status: SHIPPED | OUTPATIENT
Start: 2022-06-10 | End: 2022-11-02

## 2022-06-10 RX ORDER — TRAMADOL HYDROCHLORIDE 50 MG/1
TABLET ORAL
Qty: 40 TABLET | Refills: 0 | OUTPATIENT
Start: 2022-06-10

## 2022-07-08 NOTE — TELEPHONE ENCOUNTER
Spoke with patients spouse. Called to see if patient got crutches from [employer]. We were out of his size. Yes he got crutches. Explained he needs to wear boot and use crutches until sees ortho or podiatry. Keep elevated as much as possible. Recommend to call ortho today and try to set up appointment. .quincy  
Negative
Yes

## 2022-07-20 ENCOUNTER — TELEMEDICINE (OUTPATIENT)
Dept: FAMILY MEDICINE CLINIC | Facility: CLINIC | Age: 72
End: 2022-07-20

## 2022-07-20 DIAGNOSIS — R11.0 NAUSEA: ICD-10-CM

## 2022-07-20 DIAGNOSIS — U07.1 POSITIVE SELF-ADMINISTERED ANTIGEN TEST FOR COVID-19: Primary | ICD-10-CM

## 2022-07-20 DIAGNOSIS — R51.9 NONINTRACTABLE HEADACHE, UNSPECIFIED CHRONICITY PATTERN, UNSPECIFIED HEADACHE TYPE: ICD-10-CM

## 2022-07-20 PROCEDURE — 99213 OFFICE O/P EST LOW 20 MIN: CPT | Performed by: NURSE PRACTITIONER

## 2022-07-20 RX ORDER — ONDANSETRON 4 MG/1
4 TABLET, FILM COATED ORAL EVERY 8 HOURS PRN
Qty: 30 TABLET | Refills: 0 | Status: SHIPPED | OUTPATIENT
Start: 2022-07-20 | End: 2022-09-19 | Stop reason: ALTCHOICE

## 2022-07-20 NOTE — PROGRESS NOTES
"Chief Complaint  No chief complaint on file.    Subjective        Dilip Verma presents to [facility]  History of Present Illness   72 yr old male, pt of Dr. Avila, new to me, presenting via video visit with complaints of nausea and headache, reports tested positive for COVID on 7/17/22, taking Tylenol as needed for headache and fever, denies SOA, CP, vomiting or diarrhea. Declines paxlovid related to possible rebound symptoms and most likely positive for COVID several days prior to testing due to spouse also being positive. His spouse reports has held diurectic on several occassions the last few days related to BP average 102-106/70s, denies LE edema or weight gain. He plans for annual wellness visit in one month.         Objective    Vital Signs: Video Visit  There were no vitals taken for this visit.  Estimated body mass index is 28.21 kg/m² as calculated from the following:    Height as of 3/18/22: 175.3 cm (69\").    Weight as of 3/18/22: 86.6 kg (191 lb).          Physical Exam  Neurological:      Mental Status: He is alert and oriented to person, place, and time.   Psychiatric:         Mood and Affect: Mood normal.         Behavior: Behavior normal.      Unable to fully assess related to Video Visit.    Result Review :                Assessment and Plan   Diagnoses and all orders for this visit:    1. Positive self-administered antigen test for COVID-19 (Primary)    2. Nausea  -     ondansetron (Zofran) 4 MG tablet; Take 1 tablet by mouth Every 8 (Eight) Hours As Needed for Nausea or Vomiting.  Dispense: 30 tablet; Refill: 0    3. Nonintractable headache, unspecified chronicity pattern, unspecified headache type      Educated on Quarantine duration per CDC guidelines, advised ED if worsening of symptoms or new onset of CP or SOA.        Follow Up   No follow-ups on file.  Patient was given instructions and counseling regarding his condition or for health maintenance advice. " Please see specific information pulled into the AVS if appropriate.

## 2022-08-01 ENCOUNTER — TELEPHONE (OUTPATIENT)
Dept: FAMILY MEDICINE CLINIC | Facility: CLINIC | Age: 72
End: 2022-08-01

## 2022-08-01 NOTE — TELEPHONE ENCOUNTER
Caller: Beny Verma    Relationship to patient: Emergency Contact    Best call back number: [phone]    Patient is needing: PATIENT'S WIFE CALLED AND SAID THE PATIENT HAD COVID-19 AROUND THE BEGINNING OF January AND HAD RECOVERED     HOWEVER, HE IS STILL HAVING BOUTS OF SWEAT THAT IS USUAL AND HE SAID HE FEELS LIKE HE CAN'T GET A DEEP BREATHE, BUT IS NOT HAVING CHEST PAIN OR SHORTNESS OF BREATHE     HE IS WANTING TO SEE WHAT HE SHOULD DO, AND IF HE NEEDS TO BE SEEN    HUB DID NOT SEE AN OPENING UNTIL 08/16    HIS BLOOD PRESSURE HAD BEEN GOOD, SO HE HAD NOT BEEN TAKING MEDICATION FOR THAT RIGHT NOW

## 2022-08-11 ENCOUNTER — OFFICE VISIT (OUTPATIENT)
Dept: FAMILY MEDICINE CLINIC | Facility: CLINIC | Age: 72
End: 2022-08-11

## 2022-08-11 VITALS
BODY MASS INDEX: 28.17 KG/M2 | DIASTOLIC BLOOD PRESSURE: 88 MMHG | WEIGHT: 190.2 LBS | TEMPERATURE: 97.3 F | HEART RATE: 76 BPM | SYSTOLIC BLOOD PRESSURE: 132 MMHG | OXYGEN SATURATION: 97 % | HEIGHT: 69 IN

## 2022-08-11 DIAGNOSIS — U07.1 COVID-19 VIRUS INFECTION: Primary | ICD-10-CM

## 2022-08-11 PROCEDURE — 99212 OFFICE O/P EST SF 10 MIN: CPT | Performed by: FAMILY MEDICINE

## 2022-08-11 NOTE — PROGRESS NOTES
"SUBJECTIVE:  The patient is a 72-year-old male.  His multiple medical illnesses.  He had COVID infection in July.  He became symptomatic around July 12.  He still has some residual loss of taste and smell but this is improving.  He was having some sweats with COVID however these abated about 7 days ago.  No chest pain.  He remains unvaccinated.    PAST MEDICAL HISTORY:  Reviewed.    REVIEW OF SYSTEMS:  Please see above.  All others reviewed and are negative.      OBJECTIVE:   /88 (BP Location: Left arm, Patient Position: Sitting, Cuff Size: Large Adult)   Pulse 76   Temp 97.3 °F (36.3 °C) (Infrared)   Ht 175.3 cm (69\")   Wt 86.3 kg (190 lb 3.2 oz)   SpO2 97%   BMI 28.09 kg/m²    Vitals signs are reviewed and are stable.    General:  Well-nourished.  Alert and oriented x3 in no acute distress.  HEENT: PERRLA.   Neck:  Supple.   Lungs:  Clear.  No rales rhonchi or wheezes.  Heart:  Regular rate and rhythm.   Abdomen:   Soft, nontender.   Extremities: The patient is ambulating with a walker.  Stasis dermatitis noted with 1-2+ edema distally in the lower extremities.  Neurological:  Grossly intact without motor or sensory deficits.     ASSESSMENT:      Diagnoses and all orders for this visit:    1. COVID-19 virus infection (Primary)         PLAN: I discussed the importance of getting the vaccination at some point in the future when it is appropriate timewise.  He states he will think about it but I really do not believe he is going to do this.  He will continue to see his specialist as scheduled for his ongoing medical illnesses.    Dictated utilizing Dragon dictation.    "

## 2022-08-17 NOTE — TELEPHONE ENCOUNTER
PATIENT IS AS HOME FOR HEALTHCARE AND HIS BROKEN HIP. HE IS DOING WELL AND HIS BP IS UNDER CONTROL. AND THEY SAID THEY WILL CALL IF THEY NEED ANYTHING. WIFE STATES THEY BOTH WANT TO STAY WITH PRACTICE UNDER DR TRAN PLEASE . PLEASE CALL HER IF THEY NEED TO DO ANYTHING AT THIS TIME.      PLEASE ADVISE  [phone]    Detail Level: Detailed

## 2022-09-19 ENCOUNTER — OFFICE VISIT (OUTPATIENT)
Dept: CARDIOLOGY | Facility: CLINIC | Age: 72
End: 2022-09-19

## 2022-09-19 VITALS
OXYGEN SATURATION: 95 % | SYSTOLIC BLOOD PRESSURE: 132 MMHG | BODY MASS INDEX: 28.44 KG/M2 | HEART RATE: 72 BPM | DIASTOLIC BLOOD PRESSURE: 80 MMHG | WEIGHT: 192 LBS | HEIGHT: 69 IN

## 2022-09-19 DIAGNOSIS — R06.09 DOE (DYSPNEA ON EXERTION): ICD-10-CM

## 2022-09-19 DIAGNOSIS — I25.10 CORONARY ARTERY DISEASE INVOLVING NATIVE CORONARY ARTERY OF NATIVE HEART WITHOUT ANGINA PECTORIS: Primary | ICD-10-CM

## 2022-09-19 PROCEDURE — 99214 OFFICE O/P EST MOD 30 MIN: CPT | Performed by: NURSE PRACTITIONER

## 2022-09-19 RX ORDER — BUMETANIDE 1 MG/1
1 TABLET ORAL 2 TIMES DAILY PRN
Start: 2022-09-19

## 2022-09-19 NOTE — PROGRESS NOTES
Date of Office Visit: 22  Encounter Provider: LEIDY Mejía  Place of Service: [facility] CARDIOLOGY  Patient Name: Dilip Verma  :1950    Chief Complaint   Patient presents with   • Chronic diastolic CHF (congestive heart failure) (HCC)   • Diminished pulses in lower extremity   • Coronary artery disease involving native coronary artery of   • MARTIN (dyspnea on exertion)   • Hypertension   • Hyperlipidemia   • Sleep Apnea   • Follow-up   :     HPI: Dilip Verma is a 72 y.o. male  with  hypertension, hyperlipidemia, diabetes mellitus, coronary artery disease, congestive heart failure, thalamic hemorrhage, pulmonary hypertension, and obstructive sleep apnea. He is followed by Dr. Reyes. I will see him in follow up today.      He was admitted to [facility] in 2019 for syncope.  He was orthostatic with supine hypertension.  Clonidine was discontinued and hydralazine was increased.  He then presented to [facility] on 2019 with uncontrolled blood pressure and blurry vision.  He had an abnormal BNP and troponin.  Echocardiogram showed an ejection fraction of 53%, moderate left ventricular hypertrophy, mild to moderate left atrial enlargement and no significant valvular disease.  He had a Holter monitor which showed nonsustained atrial tachycardia.  Due to his elevated troponin he had heart catheterization which showed normal ejection fraction with multivessel coronary artery disease.  Carotid duplex showed mild bilateral carotid artery stenosis.  Transthoracic echocardiogram showed normal left ventricular systolic function with an ejection fraction of 55%, grade 3 diastolic dysfunction, no significant valvular disease.  He did have severe pulmonary hypertension on that.  He underwent coronary artery bypass grafting with LIMA to LAD, vein graft to obtuse marginal 1, vein graft to distal right coronary artery.  He had some postoperative bradycardia  and his beta-blocker was held.  He then had postoperative atrial fibrillation and was discharged on amiodarone.     He then was readmitted in early November 2019 with chest pain and non-STEMI.  Perfusion stress test suggested inferior wall and lateral wall ischemia.  Echocardiogram showed normal left ventricular systolic function with an EF of 62%, moderate left ventricular hypertrophy, grade 2 diastolic dysfunction and severe pulmonary hypertension.  Heart catheterization completed 11/6/2019 showed widely patent grafts.  His PA pressure was 80/30 with a wedge of 20.  He received IV diureses.  He had increased creatinine and BUN.  He was transition back to p.o. Lasix.   On December 1, 2020 he fell and broke his leg.  He had surgery for that.  He was admitted in December 2020 with E. coli bacteremia and sepsis.       He had revision of left hip in January of this year.  He then was diagnosed with COVID after Fourth of July gathering.  He reports feeling fatigued and having some increased shortness of breath after that.  His breathing has been better more recently.  He has follow-up with vascular approaching.  He is checking weights at home with the help of his wife which has been stable.  He normally takes bumetanide once a day but occasionally has a second dose particularly if his blood pressure is elevated reportedly.  He has no chest pain          Allergies   Allergen Reactions   • Ace Inhibitors Angioedema   • Losartan Angioedema and Unknown - Low Severity     Angioneurotic edema   • Seroquel [Quetiapine] Hallucinations   • Amlodipine Swelling   • Ativan [Lorazepam] Hallucinations   • Xanax [Alprazolam] Unknown - High Severity   • Minoxidil Confusion   • Tetracycline Other (See Comments)     bliisters in mouth             Family and social history reviewed.     ROS  All other systems were reviewed and are negative          Objective:     Vitals:    09/19/22 1130   BP: 132/80   BP Location: Left arm   Patient  "Position: Sitting   Pulse: 72   SpO2: 95%   Weight: 87.1 kg (192 lb)   Height: 175.3 cm (69\")     Body mass index is 28.35 kg/m².    PHYSICAL EXAM:  Pulmonary:      Breath sounds: Decreased breath sounds present.   Cardiovascular:      Normal rate.   Edema:     Ankle: 2+ edema of the left ankle and 1+ edema of the right ankle.        Procedures      Current Outpatient Medications   Medication Sig Dispense Refill   • aspirin 81 MG EC tablet Take 81 mg by mouth Daily. HOLD PRIOR TO SURGERY PER MD INSTRUCTIONS     • bumetanide (BUMEX) 1 MG tablet Take 1 tablet by mouth 2 (Two) Times a Day As Needed (for swelling or high blood pressure).     • Cholecalciferol (Vitamin D) 125 MCG (5000 UT) capsule capsule Take 5,000 Units by mouth Daily.     • Insulin Glargine, 2 Unit Dial, (TOUJEO) 300 UNIT/ML solution pen-injector injection Inject 34 Units under the skin into the appropriate area as directed Daily.     • insulin lispro (humaLOG) 100 UNIT/ML injection Inject 0-14 Units under the skin into the appropriate area as directed 3 (Three) Times a Day Before Meals. (Patient taking differently: Inject 0-14 Units under the skin into the appropriate area as directed 3 (Three) Times a Day Before Meals. SLIDING SCALE)  12   • MAGNESIUM PO Take  by mouth.     • sildenafil (REVATIO) 20 MG tablet Take 1-3 tablets 1 hr prior to sexual activity (Patient taking differently: HOLD PRIOR TO SURGERY 48 HOURS) 20 tablet 2   • testosterone (ANDROGEL) 25 MG/2.5GM (1%) gel gel Place  on the skin as directed by provider Daily.     • traMADol (ULTRAM) 50 MG tablet Take 1 tablet by mouth Every 8 (Eight) Hours As Needed for Severe Pain . 40 tablet 3   • Unable to find 1 each 1 (One) Time. Cbd pain stick     • Unable to find 1 each Daily As Needed. Relaxium sleep     • Unable to find 1 each 1 (One) Time. Hemp gummies       No current facility-administered medications for this visit.     Assessment:       Diagnosis Plan   1. Coronary artery disease " involving native coronary artery of native heart without angina pectoris  Adult Transthoracic Echo Complete W/ Cont if Necessary Per Protocol    Stress Test With Myocardial Perfusion One Day   2. MARTIN (dyspnea on exertion)  Adult Transthoracic Echo Complete W/ Cont if Necessary Per Protocol    Stress Test With Myocardial Perfusion One Day        Orders Placed This Encounter   Procedures   • Stress Test With Myocardial Perfusion One Day     Standing Status:   Future     Standing Expiration Date:   9/19/2023     Order Specific Question:   What stress agent will be used?     Answer:   Regadenoson (Lexiscan)     Order Specific Question:   Difficulty walking criteria?     Answer:   Poor Exercise Tolerance     Order Specific Question:   Difficulty walking criteria?     Answer:   Severe COPD O2 dependence, CHF, Dyspnea     Order Specific Question:   Reason for exam?     Answer:   Known Coronary Artery Disease     Order Specific Question:   Release to patient     Answer:   Routine Release   • Adult Transthoracic Echo Complete W/ Cont if Necessary Per Protocol     Standing Status:   Future     Standing Expiration Date:   9/19/2023     Order Specific Question:   Reason for exam?     Answer:   Chest Pain     Order Specific Question:   Reason for exam?     Answer:   Heart Failure, Cardiomyopathy, or Sytemic or Pulmonary Hypertension         Plan:                1.  72-year-old gentleman with coronary artery disease status post coronary artery bypass grafting times 3 July 2019 with LIMA to LAD, vein graft to obtuse marginal 1, vein graft to distal right coronary artery with normal left ventricular systolic function.  Then non-STEMI and  inferolateral wall ischemia on perfusion stress test.  Cardiac catheterization 11/2019 showed patent grafts normal left ventricular systolic function. Preserved LV function on echo 04/2021  -No angina however his dyspnea on exertion could be anginal equivalent so he is to have now walking protocol  perfusion stress test  2.  Hypertension .     Blood pressure appears stable  3.  Hyperlipidemia-not on statin therapy  4.  Diabetes mellitus followed by PCP  5.  Obstructive sleep apnea still having difficulty tolerating BiPAP so he only uses oxygen.  Follows with Dr. Cai   6.  Pulmonary hypertension RVSP 70 mmHg on echo 04/2021 given recent increased dyspnea we will check echocardiogram  7.  Postoperative atrial fibrillation- no known recurrence  8.  Bilateral carotid artery stenosis mild on duplex July 2019  9.  Grade 3 diastolic dysfunction on echocardiogram July 2019 but was grade 2 in April 2020 as above check echo  10.  Mild digital ischemia bilateral on AMINTA May 2022.  He is following with vascular   11. History of hypogonadism with prior testosterone replacement treatment.    12. Chronic diastolic congestive heart failure.    13. Chronic kidney disease follows with nephrology-as above.  Will need to consider consulting nephrology and patient  14.  Urinary retention now with another indwelling catheter following with urology he has had issues with recurrent UTI  15. remote history of thalamic hemorrhage noted on MRI July 2019                It has been a pleasure to participate in this patient's care.      Thank you,  LEIDY Mejía      **I used Dragon to dictate this note:**

## 2022-10-05 ENCOUNTER — HOSPITAL ENCOUNTER (OUTPATIENT)
Dept: CARDIOLOGY | Facility: HOSPITAL | Age: 72
Discharge: HOME | End: 2022-10-05

## 2022-10-05 ENCOUNTER — TELEPHONE (OUTPATIENT)
Dept: CARDIOLOGY | Facility: CLINIC | Age: 72
End: 2022-10-05

## 2022-10-05 VITALS — WEIGHT: 192 LBS | BODY MASS INDEX: 28.44 KG/M2 | HEIGHT: 69 IN | HEART RATE: 72 BPM

## 2022-10-05 VITALS — BODY MASS INDEX: 28.73 KG/M2 | HEIGHT: 69 IN | WEIGHT: 194 LBS

## 2022-10-05 DIAGNOSIS — R06.09 DOE (DYSPNEA ON EXERTION): ICD-10-CM

## 2022-10-05 DIAGNOSIS — I25.10 CORONARY ARTERY DISEASE INVOLVING NATIVE CORONARY ARTERY OF NATIVE HEART WITHOUT ANGINA PECTORIS: ICD-10-CM

## 2022-10-05 LAB
AORTIC ARCH: 2.7 CM
ASCENDING AORTA: 3.2 CM
BH CV ECHO LEFT VENTRICLE GLOBAL LONGITUDINAL STRAIN: -12.3 %
BH CV ECHO MEAS - ACS: 1.3 CM
BH CV ECHO MEAS - AO MAX PG: 7.7 MMHG
BH CV ECHO MEAS - AO MEAN PG: 4.7 MMHG
BH CV ECHO MEAS - AO ROOT DIAM: 3.5 CM
BH CV ECHO MEAS - AO V2 MAX: 138.8 CM/SEC
BH CV ECHO MEAS - AO V2 VTI: 36.3 CM
BH CV ECHO MEAS - AVA(I,D): 1.7 CM2
BH CV ECHO MEAS - EDV(CUBED): 34.9 ML
BH CV ECHO MEAS - EDV(MOD-SP2): 87 ML
BH CV ECHO MEAS - EDV(MOD-SP4): 89 ML
BH CV ECHO MEAS - EF(MOD-BP): 66.1 %
BH CV ECHO MEAS - EF(MOD-SP2): 65.5 %
BH CV ECHO MEAS - EF(MOD-SP4): 65.2 %
BH CV ECHO MEAS - ESV(CUBED): 10.5 ML
BH CV ECHO MEAS - ESV(MOD-SP2): 30 ML
BH CV ECHO MEAS - ESV(MOD-SP4): 31 ML
BH CV ECHO MEAS - FS: 33.1 %
BH CV ECHO MEAS - IVS/LVPW: 1.21 CM
BH CV ECHO MEAS - IVSD: 2.01 CM
BH CV ECHO MEAS - LAT PEAK E' VEL: 3.9 CM/SEC
BH CV ECHO MEAS - LV DIASTOLIC VOL/BSA (35-75): 43.8 CM2
BH CV ECHO MEAS - LV MASS(C)D: 249.2 GRAMS
BH CV ECHO MEAS - LV MAX PG: 5.1 MMHG
BH CV ECHO MEAS - LV MEAN PG: 2.7 MMHG
BH CV ECHO MEAS - LV SYSTOLIC VOL/BSA (12-30): 15.3 CM2
BH CV ECHO MEAS - LV V1 MAX: 112.7 CM/SEC
BH CV ECHO MEAS - LV V1 VTI: 23.1 CM
BH CV ECHO MEAS - LVIDD: 3.3 CM
BH CV ECHO MEAS - LVIDS: 2.19 CM
BH CV ECHO MEAS - LVOT AREA: 2.7 CM2
BH CV ECHO MEAS - LVOT DIAM: 1.84 CM
BH CV ECHO MEAS - LVPWD: 1.66 CM
BH CV ECHO MEAS - MED PEAK E' VEL: 3 CM/SEC
BH CV ECHO MEAS - MR MAX PG: 46.9 MMHG
BH CV ECHO MEAS - MR MAX VEL: 342.4 CM/SEC
BH CV ECHO MEAS - MV A MAX VEL: 48.4 CM/SEC
BH CV ECHO MEAS - MV DEC SLOPE: 514.9 CM/SEC2
BH CV ECHO MEAS - MV DEC TIME: 0.19 MSEC
BH CV ECHO MEAS - MV E MAX VEL: 122 CM/SEC
BH CV ECHO MEAS - MV E/A: 2.5
BH CV ECHO MEAS - MV MAX PG: 4.7 MMHG
BH CV ECHO MEAS - MV MEAN PG: 1.57 MMHG
BH CV ECHO MEAS - MV P1/2T: 60.9 MSEC
BH CV ECHO MEAS - MV V2 VTI: 27.2 CM
BH CV ECHO MEAS - MVA(P1/2T): 3.6 CM2
BH CV ECHO MEAS - MVA(VTI): 2.26 CM2
BH CV ECHO MEAS - PA ACC TIME: 0.09 SEC
BH CV ECHO MEAS - PA PR(ACCEL): 39.4 MMHG
BH CV ECHO MEAS - PA V2 MAX: 90.5 CM/SEC
BH CV ECHO MEAS - PULM A REVS DUR: 0.18 SEC
BH CV ECHO MEAS - PULM A REVS VEL: 41.6 CM/SEC
BH CV ECHO MEAS - PULM DIAS VEL: 100 CM/SEC
BH CV ECHO MEAS - PULM S/D: 0.55
BH CV ECHO MEAS - PULM SYS VEL: 55.2 CM/SEC
BH CV ECHO MEAS - QP/QS: 0.78
BH CV ECHO MEAS - RAP SYSTOLE: 15 MMHG
BH CV ECHO MEAS - RV MAX PG: 1.02 MMHG
BH CV ECHO MEAS - RV V1 MAX: 50.6 CM/SEC
BH CV ECHO MEAS - RV V1 VTI: 14.3 CM
BH CV ECHO MEAS - RVOT DIAM: 2.07 CM
BH CV ECHO MEAS - RVSP: 38.3 MMHG
BH CV ECHO MEAS - SI(MOD-SP2): 28.1 ML/M2
BH CV ECHO MEAS - SI(MOD-SP4): 28.6 ML/M2
BH CV ECHO MEAS - SUP REN AO DIAM: 2.1 CM
BH CV ECHO MEAS - SV(LVOT): 61.7 ML
BH CV ECHO MEAS - SV(MOD-SP2): 57 ML
BH CV ECHO MEAS - SV(MOD-SP4): 58 ML
BH CV ECHO MEAS - SV(RVOT): 48.1 ML
BH CV ECHO MEAS - TAPSE (>1.6): 1.6 CM
BH CV ECHO MEAS - TR MAX PG: 23.3 MMHG
BH CV ECHO MEAS - TR MAX VEL: 241.3 CM/SEC
BH CV ECHO MEASUREMENTS AVERAGE E/E' RATIO: 35.36
BH CV NUCLEAR PRIOR STUDY: 1
BH CV REST NUCLEAR ISOTOPE DOSE: 11.2 MCI
BH CV STRESS BP STAGE 1: NORMAL
BH CV STRESS COMMENTS STAGE 1: NORMAL
BH CV STRESS DOSE REGADENOSON STAGE 1: 0.4
BH CV STRESS DURATION MIN STAGE 1: 0
BH CV STRESS DURATION SEC STAGE 1: 10
BH CV STRESS HR STAGE 1: 89
BH CV STRESS NUCLEAR ISOTOPE DOSE: 33.1 MCI
BH CV STRESS PROTOCOL 1: NORMAL
BH CV STRESS RECOVERY BP: NORMAL MMHG
BH CV STRESS RECOVERY HR: 88 BPM
BH CV STRESS STAGE 1: 1
BH CV XLRA - RV BASE: 3.7 CM
BH CV XLRA - RV LENGTH: 7.4 CM
BH CV XLRA - RV MID: 2.5 CM
BH CV XLRA - TDI S': 9.6 CM/SEC
LEFT ATRIUM VOLUME INDEX: 27 ML/M2
LV EF NUC BP: 56 %
MAXIMAL PREDICTED HEART RATE: 147 BPM
MAXIMAL PREDICTED HEART RATE: 147 BPM
PERCENT MAX PREDICTED HR: 60.54 %
SINUS: 3.4 CM
STJ: 2.9 CM
STRESS BASELINE BP: NORMAL MMHG
STRESS BASELINE HR: 72 BPM
STRESS PERCENT HR: 71 %
STRESS POST EXERCISE DUR SEC: 10 SEC
STRESS POST PEAK BP: NORMAL MMHG
STRESS POST PEAK HR: 89 BPM
STRESS TARGET HR: 125 BPM
STRESS TARGET HR: 125 BPM

## 2022-10-05 PROCEDURE — A9502 TC99M TETROFOSMIN: HCPCS | Performed by: NURSE PRACTITIONER

## 2022-10-05 PROCEDURE — 93306 TTE W/DOPPLER COMPLETE: CPT | Performed by: INTERNAL MEDICINE

## 2022-10-05 PROCEDURE — 0 TECHNETIUM TETROFOSMIN KIT: Performed by: NURSE PRACTITIONER

## 2022-10-05 PROCEDURE — 78452 HT MUSCLE IMAGE SPECT MULT: CPT | Performed by: INTERNAL MEDICINE

## 2022-10-05 PROCEDURE — 93306 TTE W/DOPPLER COMPLETE: CPT

## 2022-10-05 PROCEDURE — 93356 MYOCRD STRAIN IMG SPCKL TRCK: CPT

## 2022-10-05 PROCEDURE — 93016 CV STRESS TEST SUPVJ ONLY: CPT | Performed by: INTERNAL MEDICINE

## 2022-10-05 PROCEDURE — 25010000002 PERFLUTREN (DEFINITY) 8.476 MG IN SODIUM CHLORIDE (PF) 0.9 % 10 ML INJECTION: Performed by: NURSE PRACTITIONER

## 2022-10-05 PROCEDURE — 93017 CV STRESS TEST TRACING ONLY: CPT

## 2022-10-05 PROCEDURE — 78452 HT MUSCLE IMAGE SPECT MULT: CPT

## 2022-10-05 PROCEDURE — 93018 CV STRESS TEST I&R ONLY: CPT | Performed by: INTERNAL MEDICINE

## 2022-10-05 PROCEDURE — 93356 MYOCRD STRAIN IMG SPCKL TRCK: CPT | Performed by: INTERNAL MEDICINE

## 2022-10-05 PROCEDURE — 25010000002 REGADENOSON 0.4 MG/5ML SOLUTION: Performed by: NURSE PRACTITIONER

## 2022-10-05 RX ADMIN — TETROFOSMIN 1 DOSE: 1.38 INJECTION, POWDER, LYOPHILIZED, FOR SOLUTION INTRAVENOUS at 13:31

## 2022-10-05 RX ADMIN — REGADENOSON 0.4 MG: 0.08 INJECTION, SOLUTION INTRAVENOUS at 14:09

## 2022-10-05 RX ADMIN — TETROFOSMIN 1 DOSE: 1.38 INJECTION, POWDER, LYOPHILIZED, FOR SOLUTION INTRAVENOUS at 14:09

## 2022-10-05 RX ADMIN — PERFLUTREN 3 ML: 6.52 INJECTION, SUSPENSION INTRAVENOUS at 13:26

## 2022-10-26 ENCOUNTER — TELEPHONE (OUTPATIENT)
Dept: FAMILY MEDICINE CLINIC | Facility: CLINIC | Age: 72
End: 2022-10-26

## 2022-10-26 NOTE — TELEPHONE ENCOUNTER
Caller: Beny Verma    Relationship: Emergency Contact    Best call back number: [phone]    What orders are you requesting (i.e. lab or imaging): XRAY OF BACK RIBS ON LEFT SIDE     In what timeframe would the patient need to come in: ASAP    Additional notes: HAVING PAIN AND WANTS TO GET XRAYS TO MAKE SURE NOTHING IS WRONG PLEASE CALL AND ADVISE

## 2022-11-01 ENCOUNTER — TELEPHONE (OUTPATIENT)
Dept: FAMILY MEDICINE CLINIC | Facility: CLINIC | Age: 72
End: 2022-11-01

## 2022-11-01 DIAGNOSIS — I25.10 CORONARY ARTERY DISEASE INVOLVING NATIVE CORONARY ARTERY OF NATIVE HEART WITHOUT ANGINA PECTORIS: ICD-10-CM

## 2022-11-01 DIAGNOSIS — E78.5 HYPERLIPIDEMIA, UNSPECIFIED HYPERLIPIDEMIA TYPE: ICD-10-CM

## 2022-11-01 DIAGNOSIS — E11.65 TYPE 2 DIABETES MELLITUS WITH HYPERGLYCEMIA, WITH LONG-TERM CURRENT USE OF INSULIN: ICD-10-CM

## 2022-11-01 DIAGNOSIS — G47.00 INSOMNIA, UNSPECIFIED TYPE: ICD-10-CM

## 2022-11-01 DIAGNOSIS — M25.552 LEFT HIP PAIN: ICD-10-CM

## 2022-11-01 DIAGNOSIS — Z79.4 TYPE 2 DIABETES MELLITUS WITH HYPERGLYCEMIA, WITH LONG-TERM CURRENT USE OF INSULIN: ICD-10-CM

## 2022-11-01 DIAGNOSIS — Z00.00 MEDICARE ANNUAL WELLNESS VISIT, SUBSEQUENT: ICD-10-CM

## 2022-11-01 DIAGNOSIS — I10 ESSENTIAL HYPERTENSION: ICD-10-CM

## 2022-11-01 DIAGNOSIS — N18.30 STAGE 3 CHRONIC KIDNEY DISEASE, UNSPECIFIED WHETHER STAGE 3A OR 3B CKD: ICD-10-CM

## 2022-11-01 DIAGNOSIS — Z95.1 HX OF CABG: ICD-10-CM

## 2022-11-01 DIAGNOSIS — F43.10 PTSD (POST-TRAUMATIC STRESS DISORDER): ICD-10-CM

## 2022-11-01 DIAGNOSIS — Z86.73 HISTORY OF STROKE: ICD-10-CM

## 2022-11-01 DIAGNOSIS — F41.9 ANXIETY: ICD-10-CM

## 2022-11-01 NOTE — TELEPHONE ENCOUNTER
Pt spouse called to request orders for colorguard and also wants to know if colonoscopy is needed in place of Colorguard? Requesting a callback

## 2022-11-02 ENCOUNTER — TELEPHONE (OUTPATIENT)
Dept: FAMILY MEDICINE CLINIC | Facility: CLINIC | Age: 72
End: 2022-11-02

## 2022-11-02 DIAGNOSIS — Z12.11 COLON CANCER SCREENING: Primary | ICD-10-CM

## 2022-11-02 DIAGNOSIS — Z51.81 MEDICATION MONITORING ENCOUNTER: Primary | ICD-10-CM

## 2022-11-02 RX ORDER — TRAMADOL HYDROCHLORIDE 50 MG/1
TABLET ORAL
Qty: 40 TABLET | Refills: 0 | Status: SHIPPED | OUTPATIENT
Start: 2022-11-02 | End: 2022-12-07

## 2022-11-02 NOTE — TELEPHONE ENCOUNTER
PATIENT'S WIFE STATED THE GASTROENTEROLOGIST THAT THEY WANT TO DO THE PATIENT'S COLONOSCOPY IS A PROVIDER WITH THE FIRST NAME OF VIKAS WITH [facility], POSSIBLY WITH THE LAST NAME OF OLIVER, DID NOT HAVE THE PHONE NUMBER OR ADDRESS, PLEASE ADVISE THE PATIENT'S WIFE IF THIS IS ENOUGH INFORMATION TO FIND THE GASTROENTEROLOGIST.    CONTACT: [phone] (Mobile)

## 2022-11-02 NOTE — TELEPHONE ENCOUNTER
Caller: Beny Verma    Relationship to patient: Emergency Contact    Best call back number:[phone]    Patient is needing: WIFE STATES PROVIDER THAT PATIENT REQUESTS TO SEE IS STACEY LIZARRAGA    THE ONLY THING PATIENT'S WIFE KNOWS IS THAT STACEY IS WITH [facility]

## 2022-11-30 ENCOUNTER — OFFICE VISIT (OUTPATIENT)
Dept: FAMILY MEDICINE CLINIC | Facility: CLINIC | Age: 72
End: 2022-11-30

## 2022-11-30 VITALS
HEIGHT: 69 IN | TEMPERATURE: 98.2 F | DIASTOLIC BLOOD PRESSURE: 92 MMHG | HEART RATE: 80 BPM | SYSTOLIC BLOOD PRESSURE: 142 MMHG | OXYGEN SATURATION: 96 % | WEIGHT: 197.4 LBS | RESPIRATION RATE: 18 BRPM | BODY MASS INDEX: 29.24 KG/M2

## 2022-11-30 DIAGNOSIS — R21 RASH: ICD-10-CM

## 2022-11-30 DIAGNOSIS — Z79.4 TYPE 2 DIABETES MELLITUS WITH HYPERGLYCEMIA, WITH LONG-TERM CURRENT USE OF INSULIN: ICD-10-CM

## 2022-11-30 DIAGNOSIS — E78.5 HYPERLIPIDEMIA, UNSPECIFIED HYPERLIPIDEMIA TYPE: ICD-10-CM

## 2022-11-30 DIAGNOSIS — Z00.00 MEDICARE ANNUAL WELLNESS VISIT, SUBSEQUENT: Primary | ICD-10-CM

## 2022-11-30 DIAGNOSIS — E11.65 TYPE 2 DIABETES MELLITUS WITH HYPERGLYCEMIA, WITH LONG-TERM CURRENT USE OF INSULIN: ICD-10-CM

## 2022-11-30 DIAGNOSIS — Z51.81 MEDICATION MONITORING ENCOUNTER: ICD-10-CM

## 2022-11-30 PROCEDURE — 1170F FXNL STATUS ASSESSED: CPT | Performed by: FAMILY MEDICINE

## 2022-11-30 PROCEDURE — 1126F AMNT PAIN NOTED NONE PRSNT: CPT | Performed by: FAMILY MEDICINE

## 2022-11-30 PROCEDURE — 1159F MED LIST DOCD IN RCRD: CPT | Performed by: FAMILY MEDICINE

## 2022-11-30 PROCEDURE — G0439 PPPS, SUBSEQ VISIT: HCPCS | Performed by: FAMILY MEDICINE

## 2022-11-30 NOTE — PROGRESS NOTES
The ABCs of the Annual Wellness Visit  Subsequent Medicare Wellness Visit    Chief Complaint   Patient presents with   • Medicare Wellness-subsequent      Subjective    History of Present Illness:  Dilip Verma is a 72 y.o. male who presents for a Subsequent Medicare Wellness Visit.    The following portions of the patient's history were reviewed and   updated as appropriate: past social history.    Compared to one year ago, the patient feels his physical   health is better.    Compared to one year ago, the patient feels his mental   health is the same.    Recent Hospitalizations:  He was admitted within the past 365 days at [facility].       Current Medical Providers:  Patient Care Team:  Dakota Avila MD as PCP - General (Family Medicine)  Morris Cai MD as Consulting Physician (Sleep Medicine)  Michaela Hylton MD as Consulting Physician (Cardiology)  Hardy Banerjee MD as Consulting Physician (Ophthalmology)  Chula Christianson MD as Consulting Physician (Ophthalmology)  Jason Sherman MD (Endocrinology)  Perla Mayen MD as Consulting Physician (Nephrology)  Federico Hebert MD as Consulting Physician (Pulmonary Disease)  Niraj Ravi MD as Consulting Physician (Orthopedic Surgery)  Papa Velasco MD as Consulting Physician (Urology)    Outpatient Medications Prior to Visit   Medication Sig Dispense Refill   • aspirin 81 MG EC tablet Take 81 mg by mouth Daily. HOLD PRIOR TO SURGERY PER MD INSTRUCTIONS     • bumetanide (BUMEX) 1 MG tablet Take 1 tablet by mouth 2 (Two) Times a Day As Needed (for swelling or high blood pressure).     • Cholecalciferol (Vitamin D) 125 MCG (5000 UT) capsule capsule Take 5,000 Units by mouth Daily.     • Insulin Glargine, 2 Unit Dial, (TOUJEO) 300 UNIT/ML solution pen-injector injection Inject 34 Units under the skin into the appropriate area as directed Daily.     • insulin lispro (humaLOG) 100 UNIT/ML injection Inject 0-14  Units under the skin into the appropriate area as directed 3 (Three) Times a Day Before Meals. (Patient taking differently: Inject 0-14 Units under the skin into the appropriate area as directed 3 (Three) Times a Day Before Meals. SLIDING SCALE)  12   • MAGNESIUM PO Take  by mouth.     • sildenafil (REVATIO) 20 MG tablet Take 1-3 tablets 1 hr prior to sexual activity (Patient taking differently: HOLD PRIOR TO SURGERY 48 HOURS) 20 tablet 2   • testosterone (ANDROGEL) 25 MG/2.5GM (1%) gel gel Place  on the skin as directed by provider Daily.     • traMADol (ULTRAM) 50 MG tablet TAKE 1 TABLET BY MOUTH EVERY EIGHT HOURS AS NEEDED FOR SEVERE PAIN 40 tablet 0   • Unable to find 1 each 1 (One) Time. Cbd pain stick     • Unable to find 1 each Daily As Needed. Relaxium sleep     • Unable to find 1 each 1 (One) Time. Hemp gummies       No facility-administered medications prior to visit.       Opioid medication/s are on active medication list.  and I have evaluated his active treatment plan and pain score trends (see table).  Vitals:    11/30/22 1404   PainSc: 0-No pain     I have reviewed the chart for potential of high risk medication and harmful drug interactions in the elderly.            Aspirin is on active medication list. Aspirin use is indicated based on review of current medical condition/s. Pros and cons of this therapy have been discussed today. Benefits of this medication outweigh potential harm.  Patient has been encouraged to continue taking this medication.  .      Patient Active Problem List   Diagnosis   • Anxiety   • Colon polyp   • Type 2 diabetes mellitus with hyperglycemia, with long-term current use of insulin ([facility])   • Erectile dysfunction   • Hyperlipidemia   • CAD (coronary artery disease)   • Hx of CABG   • Acute on chronic diastolic CHF (congestive heart failure) ([facility])   • Hypersomnia due to medical condition   • CSA (central sleep apnea)   • Periodic breathing   • HTN (hypertension)   • History of  "stroke   • CKD (chronic kidney disease) stage 3, GFR 30-59 ml/min ([facility])   • Urinary retention   • Acute on chronic renal failure (HCC)   • Acute on chronic diastolic (congestive) heart failure ([facility])   • Bacteriuria   • Rectal pain   • Status post total replacement of hip     Advance Care Planning  Advance Directive is not on file.  ACP discussion was held with the patient during this visit. Patient does not have an advance directive, information provided.          Objective    Vitals:    22 1404   BP: 142/92   BP Location: Left arm   Patient Position: Sitting   Pulse: 80   Resp: 18   Temp: 98.2 °F (36.8 °C)   TempSrc: Infrared   SpO2: 96%   Weight: 89.5 kg (197 lb 6.4 oz)   Height: 175.3 cm (69.02\")   PainSc: 0-No pain     Estimated body mass index is 29.14 kg/m² as calculated from the following:    Height as of this encounter: 175.3 cm (69.02\").    Weight as of this encounter: 89.5 kg (197 lb 6.4 oz).    BMI is >= 25 and <30. (Overweight) The following options were offered after discussion;: nutrition counseling/recommendations      Does the patient have evidence of cognitive impairment? No    Physical Exam            HEALTH RISK ASSESSMENT    Smoking Status:  Social History     Tobacco Use   Smoking Status Former   • Packs/day: 0.75   • Years: 16.00   • Pack years: 12.00   • Types: Cigarettes   • Quit date:    • Years since quittin.9   Smokeless Tobacco Never   Tobacco Comments    Start age:39/Stopping age:47, 3/4 PPD     Alcohol Consumption:  Social History     Substance and Sexual Activity   Alcohol Use Not Currently    Comment: alcohol abuse, none x 25 years / caffeine use - 1 cup daily     Fall Risk Screen:    STEADI Fall Risk Assessment was completed, and patient is at HIGH risk for falls. Assessment completed on:2022    Depression Screening:  PHQ-2/PHQ-9 Depression Screening 2021   Retired PHQ-9 Total Score 2   Retired Total Score 2       Health Habits and Functional and Cognitive " Screening:  Functional & Cognitive Status 11/30/2022   Do you have difficulty preparing food and eating? Yes   Do you have difficulty bathing yourself, getting dressed or grooming yourself? Yes   Do you have difficulty using the toilet? No   Do you have difficulty moving around from place to place? Yes   Do you have trouble with steps or getting out of a bed or a chair? Yes   Current Diet Diabetic Diet   Dental Exam Up to date   Eye Exam Up to date   Exercise (times per week) 0 times per week   Current Exercises Include -        Exercise Comment -   Current Exercise Activities Include -   Do you need help using the phone?  No   Are you deaf or do you have serious difficulty hearing?  No   Do you need help with transportation? Yes   Do you need help shopping? Yes   Do you need help preparing meals?  Yes   Do you need help with housework?  Yes   Do you need help with laundry? Yes   Do you need help taking your medications? No   Do you need help managing money? No   Do you ever drive or ride in a car without wearing a seat belt? No   Have you felt unusual stress, anger or loneliness in the last month? No   Who do you live with? Spouse   If you need help, do you have trouble finding someone available to you? No   Have you been bothered in the last four weeks by sexual problems? No   Do you have difficulty concentrating, remembering or making decisions? No       Age-appropriate Screening Schedule:  Refer to the list below for future screening recommendations based on patient's age, sex and/or medical conditions. Orders for these recommended tests are listed in the plan section. The patient has been provided with a written plan.    Health Maintenance   Topic Date Due   • TDAP/TD VACCINES (1 - Tdap) Never done   • ZOSTER VACCINE (1 of 2) Never done   • DIABETIC FOOT EXAM  01/06/2021   • URINE MICROALBUMIN  04/12/2022   • PROSTATE CANCER SCREENING  04/16/2022   • LIPID PANEL  06/16/2022   • INFLUENZA VACCINE  08/01/2022   •  "DIABETIC EYE EXAM  11/10/2022   • HEMOGLOBIN A1C  12/22/2022              Assessment & Plan   CMS Preventative Services Quick Reference  Risk Factors Identified During Encounter  Immunizations Discussed/Encouraged (specific Immunizations; Influenza and COVID19  The above risks/problems have been discussed with the patient.  Follow up actions/plans if indicated are seen below in the Assessment/Plan Section.  Pertinent information has been shared with the patient in the After Visit Summary.    Diagnoses and all orders for this visit:    1. Medicare annual wellness visit, subsequent (Primary)    2. Rash  -     Basic Metabolic Panel    3. Type 2 diabetes mellitus with hyperglycemia, with long-term current use of insulin (HCC)    4. Hyperlipidemia, unspecified hyperlipidemia type        Follow Up:   No follow-ups on file.     An After Visit Summary and PPPS were made available to the patient.          I have reviewed the above.    SUBJECTIVE:  The patient is a 72-year-old male.  He has multiple medical illnesses.  He will receive a Medicare wellness exam today.  His blood pressure is elevated today.  He has diabetes hypertension coronary artery disease and chronic congestive heart failure.  He has a history of stroke.  Today complains of rash on both his hands.  He feels it is from the  on his wheelchair  PAST MEDICAL HISTORY:  Reviewed.    REVIEW OF SYSTEMS:  Please see above.  All others reviewed and are negative.      OBJECTIVE:   /92 (BP Location: Left arm, Patient Position: Sitting)   Pulse 80   Temp 98.2 °F (36.8 °C) (Infrared)   Resp 18   Ht 175.3 cm (69.02\")   Wt 89.5 kg (197 lb 6.4 oz)   SpO2 96%   BMI 29.14 kg/m²    Vitals signs are reviewed and are stable.    General:  Well-nourished.  Alert and oriented x3 in no acute distress.  HEENT: PERRLA.   Neck:  Supple.   Lungs:  Clear.    Heart:  Regular rate and rhythm.   Abdomen:   Soft, nontender.   Extremities:  No cyanosis, clubbing or edema.  " Patient is in a wheelchair.  A dry scaly rash is present on both volar surfaces of the hand  Neurological:  Grossly intact without motor or sensory deficits.     ASSESSMENT:      Diagnoses and all orders for this visit:    1. Medicare annual wellness visit, subsequent (Primary)    2. Rash  -     Basic Metabolic Panel    3. Type 2 diabetes mellitus with hyperglycemia, with long-term current use of insulin (HCC)    4. Hyperlipidemia, unspecified hyperlipidemia type         PLAN: See above orders.  We discussed healthy lifestyle.  Follow-up: Labs.  See his specialist as scheduled.  Call with problems.    Dictated utilizing Dragon dictation.

## 2022-12-06 DIAGNOSIS — N18.30 STAGE 3 CHRONIC KIDNEY DISEASE, UNSPECIFIED WHETHER STAGE 3A OR 3B CKD: ICD-10-CM

## 2022-12-06 DIAGNOSIS — I25.10 CORONARY ARTERY DISEASE INVOLVING NATIVE CORONARY ARTERY OF NATIVE HEART WITHOUT ANGINA PECTORIS: ICD-10-CM

## 2022-12-06 DIAGNOSIS — Z79.4 TYPE 2 DIABETES MELLITUS WITH HYPERGLYCEMIA, WITH LONG-TERM CURRENT USE OF INSULIN: ICD-10-CM

## 2022-12-06 DIAGNOSIS — E11.65 TYPE 2 DIABETES MELLITUS WITH HYPERGLYCEMIA, WITH LONG-TERM CURRENT USE OF INSULIN: ICD-10-CM

## 2022-12-06 DIAGNOSIS — E78.5 HYPERLIPIDEMIA, UNSPECIFIED HYPERLIPIDEMIA TYPE: ICD-10-CM

## 2022-12-06 DIAGNOSIS — M25.552 LEFT HIP PAIN: ICD-10-CM

## 2022-12-06 DIAGNOSIS — G47.00 INSOMNIA, UNSPECIFIED TYPE: ICD-10-CM

## 2022-12-06 DIAGNOSIS — I10 ESSENTIAL HYPERTENSION: ICD-10-CM

## 2022-12-06 DIAGNOSIS — Z95.1 HX OF CABG: ICD-10-CM

## 2022-12-06 DIAGNOSIS — F41.9 ANXIETY: ICD-10-CM

## 2022-12-06 DIAGNOSIS — F43.10 PTSD (POST-TRAUMATIC STRESS DISORDER): ICD-10-CM

## 2022-12-06 DIAGNOSIS — Z86.73 HISTORY OF STROKE: ICD-10-CM

## 2022-12-06 DIAGNOSIS — Z00.00 MEDICARE ANNUAL WELLNESS VISIT, SUBSEQUENT: ICD-10-CM

## 2022-12-07 LAB
BUN SERPL-MCNC: 26 MG/DL (ref 8–27)
BUN/CREAT SERPL: 24 (ref 10–24)
CALCIUM SERPL-MCNC: 9.4 MG/DL (ref 8.6–10.2)
CHLORIDE SERPL-SCNC: 95 MMOL/L (ref 96–106)
CO2 SERPL-SCNC: 30 MMOL/L (ref 20–29)
CREAT SERPL-MCNC: 1.07 MG/DL (ref 0.76–1.27)
DRUGS UR: NORMAL
EGFRCR SERPLBLD CKD-EPI 2021: 73 ML/MIN/1.73
GLUCOSE SERPL-MCNC: 290 MG/DL (ref 70–99)
POTASSIUM SERPL-SCNC: 4.9 MMOL/L (ref 3.5–5.2)
SODIUM SERPL-SCNC: 138 MMOL/L (ref 134–144)

## 2022-12-07 RX ORDER — TRAMADOL HYDROCHLORIDE 50 MG/1
TABLET ORAL
Qty: 40 TABLET | Refills: 0 | Status: SHIPPED | OUTPATIENT
Start: 2022-12-07 | End: 2023-01-08

## 2022-12-13 ENCOUNTER — TELEPHONE (OUTPATIENT)
Dept: SURGERY | Facility: CLINIC | Age: 72
End: 2022-12-13

## 2022-12-13 NOTE — TELEPHONE ENCOUNTER
Provider: LINDA LIZARRAGA    Caller: JANAELESLYE REILLY    Phone Number: [phone]    Reason for Call: PT'S WIFE CALLING TO CHECK ON STATUS OF SCHEDULING COLONOSCOPY. PAPERWORK WAS SENT INTO OFFICE ABOUT A WEEK AGO PER WIFE AND SHE HASN'T HEARD ANYTHING BACK.

## 2023-01-03 RX ORDER — SILDENAFIL CITRATE 20 MG/1
TABLET ORAL
Qty: 20 TABLET | Refills: 0 | Status: SHIPPED | OUTPATIENT
Start: 2023-01-03

## 2023-01-08 DIAGNOSIS — G47.00 INSOMNIA, UNSPECIFIED TYPE: ICD-10-CM

## 2023-01-08 DIAGNOSIS — I25.10 CORONARY ARTERY DISEASE INVOLVING NATIVE CORONARY ARTERY OF NATIVE HEART WITHOUT ANGINA PECTORIS: ICD-10-CM

## 2023-01-08 DIAGNOSIS — Z95.1 HX OF CABG: ICD-10-CM

## 2023-01-08 DIAGNOSIS — Z79.4 TYPE 2 DIABETES MELLITUS WITH HYPERGLYCEMIA, WITH LONG-TERM CURRENT USE OF INSULIN: ICD-10-CM

## 2023-01-08 DIAGNOSIS — E78.5 HYPERLIPIDEMIA, UNSPECIFIED HYPERLIPIDEMIA TYPE: ICD-10-CM

## 2023-01-08 DIAGNOSIS — F43.10 PTSD (POST-TRAUMATIC STRESS DISORDER): ICD-10-CM

## 2023-01-08 DIAGNOSIS — I10 ESSENTIAL HYPERTENSION: ICD-10-CM

## 2023-01-08 DIAGNOSIS — E11.65 TYPE 2 DIABETES MELLITUS WITH HYPERGLYCEMIA, WITH LONG-TERM CURRENT USE OF INSULIN: ICD-10-CM

## 2023-01-08 DIAGNOSIS — Z00.00 MEDICARE ANNUAL WELLNESS VISIT, SUBSEQUENT: ICD-10-CM

## 2023-01-08 DIAGNOSIS — M25.552 LEFT HIP PAIN: ICD-10-CM

## 2023-01-08 DIAGNOSIS — N18.30 STAGE 3 CHRONIC KIDNEY DISEASE, UNSPECIFIED WHETHER STAGE 3A OR 3B CKD: ICD-10-CM

## 2023-01-08 DIAGNOSIS — F41.9 ANXIETY: ICD-10-CM

## 2023-01-08 DIAGNOSIS — Z86.73 HISTORY OF STROKE: ICD-10-CM

## 2023-01-08 RX ORDER — TRAMADOL HYDROCHLORIDE 50 MG/1
TABLET ORAL
Qty: 40 TABLET | Refills: 0 | Status: SHIPPED | OUTPATIENT
Start: 2023-01-08 | End: 2023-02-07

## 2023-01-17 ENCOUNTER — PREP FOR SURGERY (OUTPATIENT)
Dept: OTHER | Facility: HOSPITAL | Age: 73
End: 2023-01-17

## 2023-01-17 DIAGNOSIS — Z86.010 PERSONAL HISTORY OF COLONIC POLYPS: Primary | ICD-10-CM

## 2023-01-19 ENCOUNTER — TELEPHONE (OUTPATIENT)
Dept: SURGERY | Facility: CLINIC | Age: 73
End: 2023-01-19
Payer: MEDICARE

## 2023-01-26 PROBLEM — Z79.4 INSULIN DOSE CHANGED: Status: ACTIVE | Noted: 2022-12-29

## 2023-01-26 PROBLEM — E55.9 VITAMIN D DEFICIENCY: Status: ACTIVE | Noted: 2022-12-29

## 2023-01-26 PROBLEM — M14.60 ARTHROPATHY ASSOCIATED WITH NEUROLOGICAL DISORDER: Status: ACTIVE | Noted: 2022-12-29

## 2023-01-26 PROBLEM — U09.9 POST-ACUTE COVID-19 SYNDROME: Status: ACTIVE | Noted: 2022-12-29

## 2023-01-26 RX ORDER — ASPIRIN 81 MG
TABLET, DELAYED RELEASE (ENTERIC COATED) ORAL DAILY
COMMUNITY

## 2023-01-26 RX ORDER — LATANOPROST 50 UG/ML
1 SOLUTION/ DROPS OPHTHALMIC
COMMUNITY
Start: 2023-01-16

## 2023-01-27 ENCOUNTER — OFFICE VISIT (OUTPATIENT)
Dept: SURGERY | Facility: CLINIC | Age: 73
End: 2023-01-27
Payer: MEDICARE

## 2023-01-27 VITALS
HEART RATE: 75 BPM | TEMPERATURE: 97.9 F | WEIGHT: 194.4 LBS | SYSTOLIC BLOOD PRESSURE: 146 MMHG | DIASTOLIC BLOOD PRESSURE: 94 MMHG | HEIGHT: 69 IN | BODY MASS INDEX: 28.79 KG/M2 | OXYGEN SATURATION: 95 %

## 2023-01-27 DIAGNOSIS — K60.2 ANAL FISSURE: ICD-10-CM

## 2023-01-27 DIAGNOSIS — Z86.010 PERSONAL HISTORY OF COLONIC POLYPS: Primary | ICD-10-CM

## 2023-01-27 PROCEDURE — 99204 OFFICE O/P NEW MOD 45 MIN: CPT | Performed by: PHYSICIAN ASSISTANT

## 2023-01-27 NOTE — PROGRESS NOTES
Dilip Verma is a 72 y.o. male who is seen as a consult at the request of No ref. provider found for Abdominal Pain (Patient here today with Wife Beny Verma.).      HPI:  Pt presents today for evaluation of rectal pain and to discuss colonoscopy.   As of a few months ago, started passing large, hard stools.   Experiencing cutting sensation when defecating.   Denies any RB.  Pt is taking Tramadol for chronic left hip pain S/P hip replacement.   Started taking Dulcolax every 1-2 days, but still passing hard stools.   Takes Mg occasionally.     Last colonoscopy in 2015. See report below.   Pt denies any known FHx of colon polyps, colon cancer, or IBD.     Taking ASA 81 mg. Denies taking any anticoagulation.     Past Medical History:   Diagnosis Date   • AMENA (acute kidney injury) (HCC) 12/03/2020   • CORI positive    • Anemia    • Anxiety    • Ataxia    • Atypical chest pain 04/19/2022    SEEN AT [facility] ER   • Balance disorder    • Cataract     BILATERAL, S/P EXTRACTION   • Cervical radiculopathy 11/11/2019    SEEN AT  [facility] ER   • Cervical spinal cord compression (HCC)    • Chronic diastolic (congestive) heart failure (HCC)    • Chronic kidney disease     STAGE 3, FOLLOWED BY DR. VIVAR   • Chronic pancreatitis ([facility])    • Closed left subtrochanteric femur fracture ([facility]) 12/01/2020    ADMITTED TO [facility]   • Closed nondisplaced intertrochanteric fracture of left femur ([facility]) 12/01/2020    ADMITTED TO [facility]   • Colon polyps     FOLLOWED BY DR. AVTAR JEONG   • Constipation    • Contracture, right hand    • Coronary artery disease     CABG 7/2019   • COVID-19 07/2022   • DDD (degenerative disc disease), cervical    • Diabetes mellitus, type II (HCC)     IDDM   • Diabetic retinopathy (HCC)    • Dysphagia    • Elevated brain natriuretic peptide (BNP) level 08/2014   • Elevated LFTs 03/2021   • Erectile dysfunction    • Foot drop    • Fuchs' corneal dystrophy of right eye    • Glaucoma     BILATERAL   • History of alcohol abuse    •  "Hyperlipidemia    • Hypersomnia    • Hypertension    • Hypertensive urgency 02/25/2020    ADMITTED TO [facility]   • Insomnia    • Kidney stones    • LV dysfunction 06/2016   • Lyme disease    • Lymphadenopathy syndrome 05/2021   • Macular edema     BILATERAL   • Myocardial infarction ([facility]) 11/05/2019    NSTEMI, ADMITTED TO [facility]   • Myocardial infarction ([facility]) 07/12/2019    NSTEMI, ADMITTED TO [facility]   • Non-celiac gluten sensitivity    • Orthostasis    • Osteoporosis    • Oxygen dependent    • PAD (peripheral artery disease) ([facility])    • PNA (pneumonia) 06/2016    LEFT LOBE   • Polyneuropathy    • PTSD (post-traumatic stress disorder)    • Pulmonary hypertension ([facility])    • Pulmonary nodule    • Senile ectropion of both lower eyelids 02/2022   • Sepsis ([facility]) 12/16/2020    D/T UTI, ADMITTED TO [facility]   • Sleep apnea     STATES DOESN'T USE BIPAP OR CPAP   • Spinal stenosis in cervical region     SEVERE-LIMITED ROM   • Stroke ([facility]) 2012    \"slight stroke\"   • Syncope and collapse 07/06/2019    ADMITTED TO [place]   • Urinary retention 04/05/2021    SEEN AT [facility] ER   • Vitamin D deficiency        Past Surgical History:   Procedure Laterality Date   • CARDIAC CATHETERIZATION N/A 07/15/2019    Procedure: Coronary angiography;  Surgeon: Carrie Price MD;  Location:  [facility];  Service: Cardiovascular   • CARDIAC CATHETERIZATION N/A 07/15/2019    Procedure: Left Heart Cath;  Surgeon: Carrie Price MD;  Location:  [facility];  Service: Cardiovascular   • CARDIAC CATHETERIZATION N/A 07/15/2019    Procedure: Left ventriculography;  Surgeon: Carrie Price MD;  Location:  [facility];  Service: Cardiovascular   • CARDIAC CATHETERIZATION  07/15/2019    Procedure: Functional Flow [place];  Surgeon: Carrie Price MD;  Location:  [facility]ATION;  Service: Cardiovascular   • CARDIAC CATHETERIZATION N/A 11/06/2019    Procedure: Right and Left Heart Cath;  Surgeon: Luis" MD Mya;  Location[facility];  Service: Cardiovascular   • CARDIAC CATHETERIZATION N/A 11/06/2019    Procedure: Coronary angiography;  Surgeon: Mya Smith MD;  Location: [facility];  Service: Cardiovascular   • CATARACT EXTRACTION Left 2014   • CATARACT EXTRACTION Right 11/2016    PHACO/IOL, DR. LEN DENTON   • COLONOSCOPY W/ POLYPECTOMY N/A 01/02/2015    A FEW DIVERTICULA IN SIGMOID, 6 MM TUBULOVILLOUS ADENOMA POLYP IN RECTUM, SMALL HEMORRHOIDS, MELANOSIS COLI, DR. AVTAR JEONG AT [facility]   • CORONARY ARTERY BYPASS GRAFT N/A 07/18/2019    Procedure: INTRAOPERATIVE SHOAIB; STERNOTOMY CORONARY ARTERY BYPASS x 3  USING LEFT INTERNAL MAMMARY ARTERY GRAFT UTILIZING ENDOSCOPICALLY HARVESTED RIGHT GREATER SAPHENOUS VEIN AND PRP.;  Surgeon: Bill Devi MD;  Location: [facility] MAIN OR;  Service: Cardiothoracic   • CYSTOSCOPY BLADDER STONE LITHOTRIPSY N/A    • DENTAL PROCEDURE Bilateral     3 surgeries inder implants   • FEMUR IM NAILING/RODDING Left 12/03/2020    Procedure: LEFT HIP INTRAMEDULLARY NAIL;  Surgeon: Niraj Ravi MD;  Location: [facility] MAIN OR;  Service: Orthopedic Spine;  Laterality: Left;   • INGUINAL HERNIA REPAIR Bilateral    • TOENAIL EXCISION  06/2022   • TONSILLECTOMY Bilateral    • TOTAL HIP ARTHROPLASTY REVISION Left 01/11/2022    Procedure: TOTAL HIP ARTHROPLASTY REVISION- POSTERIOR;  Surgeon: Jurgen Candelaria II, MD;  Location: [facility] OR;  Service: Orthopedics;  Laterality: Left;   • VASECTOMY N/A        Social History:   reports that he quit smoking about 23 years ago. His smoking use included cigarettes. He has a 12.00 pack-year smoking history. He has been exposed to tobacco smoke. He has never used smokeless tobacco. He reports that he does not currently use alcohol. He reports that he does not use drugs.      Marriage status:     Family History   Problem Relation Age of Onset   • Heart disease Mother    •  Hypertension Mother    • Hyperlipidemia Mother    • Depression Mother    • Cancer Mother         uterine   • Arrhythmia Mother    • Uterine cancer Mother    • Hyperlipidemia Father    • Heart disease Father    • Hypertension Father    • Kidney disease Father    • Thyroid disease Father    • Heart failure Father    • Depression Sister    • Alcohol abuse Brother    • Thyroid disease Paternal Uncle    • Lung disease Paternal Uncle    • Stroke Maternal Grandmother    • Glaucoma Maternal Grandmother    • Heart attack Paternal Grandmother    • Stroke Paternal Grandmother    • Alcohol abuse Paternal Grandfather    • Malig Hyperthermia Neg Hx          Current Outpatient Medications:   •  aspirin 81 MG EC tablet, Take 81 mg by mouth Daily. HOLD PRIOR TO SURGERY PER MD INSTRUCTIONS, Disp: , Rfl:   •  bumetanide (BUMEX) 1 MG tablet, Take 1 tablet by mouth 2 (Two) Times a Day As Needed (for swelling or high blood pressure)., Disp: , Rfl:   •  Cholecalciferol (Vitamin D) 125 MCG (5000 UT) capsule capsule, Take 5,000 Units by mouth Daily., Disp: , Rfl:   •  Docusate Sodium 100 MG capsule, Daily., Disp: , Rfl:   •  Insulin Glargine, 2 Unit Dial, (TOUJEO) 300 UNIT/ML solution pen-injector injection, Inject 34 Units under the skin into the appropriate area as directed Daily., Disp: , Rfl:   •  insulin lispro (humaLOG) 100 UNIT/ML injection, Inject 0-14 Units under the skin into the appropriate area as directed 3 (Three) Times a Day Before Meals. (Patient taking differently: Inject 0-14 Units under the skin into the appropriate area as directed 3 (Three) Times a Day Before Meals. SLIDING SCALE), Disp: , Rfl: 12  •  latanoprost (XALATAN) 0.005 % ophthalmic solution, Administer 1 drop to both eyes every night at bedtime., Disp: , Rfl:   •  MAGNESIUM PO, Take  by mouth., Disp: , Rfl:   •  sildenafil (REVATIO) 20 MG tablet, TAKE 1-3 TABLETS 1 HR PRIOR TO SEXUAL ACTIVITY, Disp: 20 tablet, Rfl: 0  •  testosterone (ANDROGEL) 25 MG/2.5GM  (1%) gel gel, Place  on the skin as directed by provider Daily., Disp: , Rfl:   •  traMADol (ULTRAM) 50 MG tablet, TAKE 1 TABLET BY MOUTH EVERY EIGHT HOURS  AS NEEDED FOR SEVERE PAIN, Disp: 40 tablet, Rfl: 0  •  Unable to find, 1 each 1 (One) Time. Cbd pain stick, Disp: , Rfl:   •  Unable to find, 1 each 1 (One) Time. Hemp gummies, Disp: , Rfl:     Allergy  Ace inhibitors, Losartan, Seroquel [quetiapine], Xanax [alprazolam], Amlodipine, Ativan [lorazepam], Minoxidil, and Tetracycline    Review of Systems   Constitutional: Negative for decreased appetite and weight gain.   HENT: Positive for tinnitus. Negative for congestion, hearing loss and hoarse voice.    Eyes: Positive for photophobia. Negative for blurred vision, discharge and visual disturbance.   Cardiovascular: Positive for leg swelling. Negative for chest pain and cyanosis.   Respiratory: Positive for shortness of breath. Negative for cough, sleep disturbances due to breathing and snoring.    Endocrine: Negative for cold intolerance and heat intolerance.   Hematologic/Lymphatic: Does not bruise/bleed easily.   Skin: Negative for itching, poor wound healing and skin cancer.   Musculoskeletal: Positive for back pain. Negative for arthritis, joint pain and joint swelling.   Gastrointestinal: Positive for bloating and constipation. Negative for abdominal pain, change in bowel habit and bowel incontinence.   Genitourinary: Negative for bladder incontinence, dysuria and hematuria.   Neurological: Positive for loss of balance, numbness and weakness. Negative for brief paralysis, excessive daytime sleepiness, dizziness, focal weakness, headaches and light-headedness.   Psychiatric/Behavioral: Positive for depression. Negative for altered mental status and hallucinations. The patient is nervous/anxious. The patient does not have insomnia.    Allergic/Immunologic: Negative for HIV exposure and persistent infections.   All other systems reviewed and are  negative.      Vitals:    01/27/23 1126   BP: 146/94   Pulse: 75   Temp: 97.9 °F (36.6 °C)   SpO2: 95%     Body mass index is 28.71 kg/m².    Physical Exam  Exam conducted with a chaperone present.   Constitutional:       General: He is not in acute distress.     Appearance: He is well-developed.   HENT:      Head: Normocephalic and atraumatic.      Nose: Nose normal.   Eyes:      Conjunctiva/sclera: Conjunctivae normal.      Pupils: Pupils are equal, round, and reactive to light.   Neck:      Trachea: No tracheal deviation.   Pulmonary:      Effort: Pulmonary effort is normal. No respiratory distress.      Breath sounds: Normal breath sounds.   Abdominal:      General: Bowel sounds are normal. There is no distension.      Palpations: Abdomen is soft.   Genitourinary:     Comments: Perianal exam: Posterior anal fissure  Musculoskeletal:         General: No deformity. Normal range of motion.      Cervical back: Normal range of motion.   Skin:     General: Skin is warm and dry.   Neurological:      Mental Status: He is alert and oriented to person, place, and time.      Cranial Nerves: No cranial nerve deficit.      Coordination: Coordination normal.      Gait: Gait normal.   Psychiatric:         Behavior: Behavior normal.         Judgment: Judgment normal.         Review of Medical Record:  I reviewed PMHx, Surgical Hx, FHx, and Current Medications.     Colonoscopy 01/02/2015:  - Diverticulosis, Sigmoid Colon  - 6 mm Tubulovillous Adenoma, Rectum  - Internal Hemorrhoids  - Melanosis Coli  - Dr. Terese Yost, [facility]    Assessment:  1. Personal history of colonic polyps    2. Anal fissure    - New    Plan:  - Discussed physical exam findings with Pt.  - Start Fiber. Recommended 30-40 Grams Daily. Printed instructions provided.   - Take Miralax QD. Increase to BID as needed.   - SS PRN  - Rx for Diltiazem/Lidocaine Compound Cream Provided. Pt instructed to apply externally TID x10 weeks.  - Will schedule  colonoscopy given personal Hx of colon polyps and further evaluation of rectal pain.

## 2023-01-30 PROBLEM — Z86.0100 PERSONAL HISTORY OF COLONIC POLYPS: Status: ACTIVE | Noted: 2023-01-30

## 2023-01-30 PROBLEM — Z86.010 PERSONAL HISTORY OF COLONIC POLYPS: Status: ACTIVE | Noted: 2023-01-30

## 2023-02-06 DIAGNOSIS — Z00.00 MEDICARE ANNUAL WELLNESS VISIT, SUBSEQUENT: ICD-10-CM

## 2023-02-06 DIAGNOSIS — Z95.1 HX OF CABG: ICD-10-CM

## 2023-02-06 DIAGNOSIS — E78.5 HYPERLIPIDEMIA, UNSPECIFIED HYPERLIPIDEMIA TYPE: ICD-10-CM

## 2023-02-06 DIAGNOSIS — M25.552 LEFT HIP PAIN: ICD-10-CM

## 2023-02-06 DIAGNOSIS — I10 ESSENTIAL HYPERTENSION: ICD-10-CM

## 2023-02-06 DIAGNOSIS — F41.9 ANXIETY: ICD-10-CM

## 2023-02-06 DIAGNOSIS — G47.00 INSOMNIA, UNSPECIFIED TYPE: ICD-10-CM

## 2023-02-06 DIAGNOSIS — I25.10 CORONARY ARTERY DISEASE INVOLVING NATIVE CORONARY ARTERY OF NATIVE HEART WITHOUT ANGINA PECTORIS: ICD-10-CM

## 2023-02-06 DIAGNOSIS — Z86.73 HISTORY OF STROKE: ICD-10-CM

## 2023-02-06 DIAGNOSIS — Z79.4 TYPE 2 DIABETES MELLITUS WITH HYPERGLYCEMIA, WITH LONG-TERM CURRENT USE OF INSULIN: ICD-10-CM

## 2023-02-06 DIAGNOSIS — E11.65 TYPE 2 DIABETES MELLITUS WITH HYPERGLYCEMIA, WITH LONG-TERM CURRENT USE OF INSULIN: ICD-10-CM

## 2023-02-06 DIAGNOSIS — F43.10 PTSD (POST-TRAUMATIC STRESS DISORDER): ICD-10-CM

## 2023-02-06 DIAGNOSIS — N18.30 STAGE 3 CHRONIC KIDNEY DISEASE, UNSPECIFIED WHETHER STAGE 3A OR 3B CKD: ICD-10-CM

## 2023-02-07 RX ORDER — TRAMADOL HYDROCHLORIDE 50 MG/1
TABLET ORAL
Qty: 40 TABLET | Refills: 0 | Status: SHIPPED | OUTPATIENT
Start: 2023-02-07 | End: 2023-03-20 | Stop reason: SDUPTHER

## 2023-02-22 ENCOUNTER — TELEPHONE (OUTPATIENT)
Dept: FAMILY MEDICINE CLINIC | Facility: CLINIC | Age: 73
End: 2023-02-22
Payer: MEDICARE

## 2023-02-22 RX ORDER — MOMETASONE FUROATE 1 MG/G
1 OINTMENT TOPICAL DAILY
Qty: 15 G | Refills: 3 | Status: SHIPPED | OUTPATIENT
Start: 2023-02-22

## 2023-02-22 RX ORDER — DIAPER,BRIEF,INFANT-TODD,DISP
1 EACH MISCELLANEOUS 2 TIMES DAILY
Qty: 28 G | Refills: 3 | Status: SHIPPED | OUTPATIENT
Start: 2023-02-22 | End: 2023-02-22 | Stop reason: ALTCHOICE

## 2023-02-22 NOTE — TELEPHONE ENCOUNTER
Caller: Beny Verma    Relationship: Emergency Contact    Best call back number: [phone]    What medication are you requesting: CREAM FOR CRACKED SKIN ON BOTH HANDS    Have you had these symptoms before:    [x] Yes  [] No    Have you been treated for these symptoms before:   [x] Yes  [] No    If a prescription is needed, what is your preferred pharmacy and phone number: [id number] - [place], [place] - [address] - [phone] [facility] [phone] FX     Additional notes:PATIENT WAS ADVISED TO TRY OTC CREAMS AND TO CALL BACK IF THEY DID NOT HELP AND THEY DIDN'T HELP. PLEASE ADVISE.

## 2023-03-07 ENCOUNTER — TELEPHONE (OUTPATIENT)
Dept: FAMILY MEDICINE CLINIC | Facility: CLINIC | Age: 73
End: 2023-03-07

## 2023-03-07 NOTE — TELEPHONE ENCOUNTER
Caller: Beny Verma    Relationship: Emergency Contact    Best call back number: [phone]    What was the call regarding: PATIENT HAS BEEN HAVING MOMENTS WHERE HE IS EXPERIENCING REPETITIVE MOTIONS. HE IS HAVING NO OTHER SYMPTOMS BESIDES FATIGUE-HE HAS NOT BEEN SLEEPING VERY WELL. HE IS VERY RESTLESS.     PATIENT'S WIFE CALLED [facility] AND THEY SUGGESTED AN MRI. CAN DR TRAN PLEASE PUT ORDERS IN FOR THIS?     Do you require a callback: YES

## 2023-03-08 DIAGNOSIS — F98.4 REPETITIVE ROCKING MOVEMENTS: Primary | ICD-10-CM

## 2023-03-08 NOTE — TELEPHONE ENCOUNTER
He sits on edge of bed and rocks back and forth and turning from side-side/ he was unable to understand what they try to tell him to follow commands.hx of CVA almost looks like Parkinsons sx

## 2023-03-08 NOTE — TELEPHONE ENCOUNTER
Wife said its not all the time, they spoke with his psych who said we may do MRI or refer to neurologist

## 2023-03-08 NOTE — TELEPHONE ENCOUNTER
If he is having trouble comprehending commands and a history of stroke there is a possibility he may have had another stroke.  I think he should go to the emergency room and be evaluated.

## 2023-03-15 ENCOUNTER — OFFICE VISIT (OUTPATIENT)
Dept: CARDIOLOGY | Facility: CLINIC | Age: 73
End: 2023-03-15
Payer: MEDICARE

## 2023-03-15 VITALS
HEIGHT: 69 IN | HEART RATE: 72 BPM | DIASTOLIC BLOOD PRESSURE: 98 MMHG | WEIGHT: 195 LBS | SYSTOLIC BLOOD PRESSURE: 158 MMHG | BODY MASS INDEX: 28.88 KG/M2

## 2023-03-15 DIAGNOSIS — I73.9 PAD (PERIPHERAL ARTERY DISEASE): ICD-10-CM

## 2023-03-15 DIAGNOSIS — I50.32 CHRONIC DIASTOLIC CHF (CONGESTIVE HEART FAILURE): ICD-10-CM

## 2023-03-15 DIAGNOSIS — I25.10 CORONARY ARTERY DISEASE INVOLVING NATIVE CORONARY ARTERY OF NATIVE HEART WITHOUT ANGINA PECTORIS: Primary | ICD-10-CM

## 2023-03-15 PROCEDURE — 99214 OFFICE O/P EST MOD 30 MIN: CPT | Performed by: INTERNAL MEDICINE

## 2023-03-15 PROCEDURE — 1159F MED LIST DOCD IN RCRD: CPT | Performed by: INTERNAL MEDICINE

## 2023-03-15 PROCEDURE — 3080F DIAST BP >= 90 MM HG: CPT | Performed by: INTERNAL MEDICINE

## 2023-03-15 PROCEDURE — 3077F SYST BP >= 140 MM HG: CPT | Performed by: INTERNAL MEDICINE

## 2023-03-15 PROCEDURE — 1160F RVW MEDS BY RX/DR IN RCRD: CPT | Performed by: INTERNAL MEDICINE

## 2023-03-15 RX ORDER — BACLOFEN 10 MG/1
1 TABLET ORAL 4 TIMES DAILY PRN
COMMUNITY
Start: 2023-03-06

## 2023-03-15 NOTE — PROGRESS NOTES
[facility]      Patient Name: Dilip Verma  :1950  Age: 72 y.o.  Encounter Provider:  Amador Reyes Jr, MD      Chief Complaint: Follow-up coronary artery disease status post CABG 2019, chronic diastolic heart failure, COPD on nocturnal oxygen, BiPAP noncompliance, diabetes, hypertension and dyslipidemia      Coronary Artery Disease  Symptoms include leg swelling and shortness of breath. Pertinent negatives include no chest pain, dizziness or palpitations.     Dilip Verma is a 72 y.o. male with history of hypertension, hyperlipidemia, diabetes mellitus, coronary artery disease, congestive heart failure, thalamic hemorrhage, pulmonary hypertension, and obstructive sleep apnea.  He is here to establish care with me today.     He was admitted to [facility] in 2019 for syncope.  He was orthostatic with supine hypertension.  Clonidine was discontinued and hydralazine was increased.  He then presented to [facility] [place] on 2019 with uncontrolled blood pressure and blurry vision.  He had an abnormal BNP and troponin.  Echocardiogram showed an ejection fraction of 53%, moderate left ventricular hypertrophy, mild to moderate left atrial enlargement and no significant valvular disease.  He had a Holter monitor which showed paroxysmal atrial tachycardia.  Due to his elevated troponin he underwent cardiac catheterization which showed normal ejection fraction with multivessel coronary artery disease. He underwent coronary artery bypass grafting with LIMA to LAD, vein graft to obtuse marginal 1, vein graft to distal right coronary artery. He was noted to have postoperative atrial fibrillation and was discharged on amiodarone.     He was readmitted in early 2019 with chest pain and non-STEMI.  Perfusion stress test suggested inferior wall and lateral wall ischemia. Heart catheterization completed 2019 showed widely patent grafts.      On 2020 he fell  and was diagnosed with multiple femoral fractures.    He has undergone 2 separate surgical interventions for his leg which have left him immobilized over the last few months.  He just got home from skilled nursing facility in has been able to bear weight for a long time.  This has been disruptive to monitoring fluid status at home and she noted that his waist increased in size significantly.  He is feeling more short of breath.  He has increased lower extremity edema.  No angina.  No dizziness or syncope.  Has been sleeping in a recliner.  He is not using his BiPAP as he feels that he needs oxygen at night and visit has been using nasal cannula.  He is currently on Lasix 80 mg in the morning and 40 mg at night.     Patient saw Danii Guerrier in April 2021 and volume status was not improved.  He was admitted to the hospital and diuresed well.  Volume status stable on current outpatient regimen.  Patient is increasing activity as tolerated.  He still very weak from the multiple hospitalizations with a lot of pain in the left leg.  He denies orthopnea or PND.  Lower extremity edema is much improved.    He has done well since then. Tolerating diuretics well.  Total hip replacement in January.  Uncomplicated surgery with ongoing recovery.  He is participating in physical therapy several times per week with no angina or heart failure.  He has left lower extremity edema which is chronic and stable.  No orthopnea or PND.  Last LDL 73.  Social and family history was reviewed and is not pertinent to this clinic visit.    He saw LEIDY Dennison at last visit.  He had multiple complaints and had a nuclear stress study that showed no evidence of myocardial ischemia in October 2022.  He also had an echocardiogram that showed normal EF but was listed as an abnormal longitudinal strain.  I have reviewed images and it does not appear that the algorithm was tracking properly.  He has no complaints since then.  No chest pain or  "shortness of air.  He still has chronic swelling of his left lower extremity which he feels is fairly unchanged but responds to Bumex.  The Bumex is managed by Dr. JESSICA Mayen who is his nephrologist.  Social family history was reviewed and is not pertinent to this clinic visit.    The following portions of the patient's history were reviewed and updated as appropriate: allergies, current medications, past family history, past medical history, past social history, past surgical history and problem list.      Review of Systems   Constitutional: Negative for chills and fever.   HENT: Negative for hoarse voice and sore throat.    Eyes: Negative for double vision and photophobia.   Cardiovascular: Positive for leg swelling. Negative for chest pain, dyspnea on exertion, near-syncope, orthopnea, palpitations, paroxysmal nocturnal dyspnea and syncope.   Respiratory: Positive for shortness of breath. Negative for cough and wheezing.    Skin: Negative for poor wound healing and rash.   Musculoskeletal: Negative for arthritis and joint swelling.   Gastrointestinal: Negative for bloating, abdominal pain, hematemesis and hematochezia.   Neurological: Negative for dizziness and focal weakness.   Psychiatric/Behavioral: Negative for depression and suicidal ideas.     ROS was reviewed, updated and amended when necessary.    OBJECTIVE:   Vital Signs  Vitals:    03/15/23 1117   BP: 158/98   Pulse: 72     Estimated body mass index is 28.8 kg/m² as calculated from the following:    Height as of this encounter: 175.3 cm (69\").    Weight as of this encounter: 88.5 kg (195 lb).    Vitals reviewed.   Constitutional:       Appearance: Healthy appearance. Not in distress.   Neck:      Vascular: JVR present. JVD normal.   Pulmonary:      Effort: Pulmonary effort is normal.      Breath sounds: No wheezing. No rhonchi. No rales.   Chest:      Chest wall: Not tender to palpatation.   Cardiovascular:      PMI at left midclavicular line. Normal " rate. Regular rhythm. Normal S1. Normal S2.      Murmurs: There is no murmur.      No gallop. No click. No rub.   Pulses:     Intact distal pulses.   Edema:     Peripheral edema present.     Pretibial: 1+ edema of the left pretibial area.     Ankle: 1+ edema of the left ankle.     Feet: 1+ edema of the left foot.  Abdominal:      General: Bowel sounds are normal.      Palpations: Abdomen is soft.      Tenderness: There is no abdominal tenderness.   Musculoskeletal: Normal range of motion.         General: No tenderness. Skin:     General: Skin is cool and dry.   Neurological:      General: No focal deficit present.      Mental Status: Alert and oriented to person, place and time.       Physical exam was reviewed, updated and amended when necessary.    Procedures          ASSESSMENT:     71-year-old male with multiple medical comorbidities presents for worsening heart failure.    PLAN OF CARE:     1. Coronary artery disease status post coronary artery bypass grafting times 3 July 2019 with LIMA to LAD, vein graft to obtuse marginal 1, vein graft to distal right coronary artery with normal left ventricular systolic function.  Then non-STEMI and  inferolateral wall ischemia on perfusion stress test.  Cardiac catheterization 11/2019 showed patent grafts normal left ventricular systolic function.  No angina.  Continue aspirin and beta-blocker.  LDL is reasonably well controlled off of statin and he is reticent to starting any other medications at this time.  Negative stress study in October 2022.  We will continue to monitor closely.  2.  Hypertension - elevated today in clinic.  Sodium restricted diet.  Twice daily blood pressure log to be sent to the office after 1 week.  3.  Hyperlipidemia-he is not on statin therapy and is reticent to start a new medication regimen  4.  Diabetes mellitus followed by PCP  5.  Obstructive sleep apnea still having difficulty tolerating BiPAP.  Follows with Dr. Cai   6.  Pulmonary  hypertension  7.  Postoperative atrial fibrillation- no known recurrence  8.  Bilateral carotid artery stenosis mild on duplex July 2019.  Following with vascular surgery.  9.  Grade 3 diastolic dysfunction on echocardiogram July 2019  10. Remote history of thalamic hemorrhage noted on MRI July 2019  11. History of hypogonadism with prior testosterone replacement treatment.    12.  Chronic diastolic heart failure -blood pressure better controlled at home. Euvolemic today in clinic other than chronic left lower extremity swelling.  Element of volume retention secondary to CKD.  Bumex therapy managed by nephrology.  13. Chronic kidney disease follows with nephrology   14.  Diminished lower extremity pulses - abnormal AMINTA. Following with vascular surgery.    Vascular issues are stable.  Return to clinic 6 months.           Discharge Medications          Accurate as of March 15, 2023 11:36 AM. If you have any questions, ask your nurse or doctor.            Changes to Medications      Instructions Start Date   insulin lispro 100 UNIT/ML injection  Commonly known as: humaLOG  What changed: additional instructions   0-14 Units, Subcutaneous, 3 Times Daily Before Meals         Continue These Medications      Instructions Start Date   aspirin 81 MG EC tablet   81 mg, Oral, Daily, HOLD PRIOR TO SURGERY PER MD INSTRUCTIONS      baclofen 10 MG tablet  Commonly known as: LIORESAL   1 tablet, Oral, 4 Times Daily PRN      bumetanide 1 MG tablet  Commonly known as: BUMEX   1 mg, Oral, 2 Times Daily PRN      Docusate Sodium 100 MG capsule   Daily      Insulin Glargine (2 Unit Dial) 300 UNIT/ML solution pen-injector injection  Commonly known as: TOUJEO   34 Units, Subcutaneous, Daily      latanoprost 0.005 % ophthalmic solution  Commonly known as: XALATAN   1 drop, Both Eyes, Every Night at Bedtime      MAGNESIUM PO   Oral      mometasone 0.1 % ointment  Commonly known as: ELOCON   1 application, Topical, Daily, Do not exceed 1 week,  needs to be off 1 week and can restart      sildenafil 20 MG tablet  Commonly known as: REVATIO   TAKE 1-3 TABLETS 1 HR PRIOR TO SEXUAL ACTIVITY      testosterone 25 MG/2.5GM (1%) gel gel  Commonly known as: ANDROGEL   Transdermal, Daily      traMADol 50 MG tablet  Commonly known as: ULTRAM   TAKE 1 TABLET BY MOUTH EVERY EIGHT HOURS AS NEEDED FOR SEVERE PAIN      Unable to find   1 each, Once, Cbd pain stick      Unable to find   1 each, Once, Hemp gummies      vitamin D3 125 MCG (5000 UT) capsule capsule   5,000 Units, Oral, Daily             Thank you for allowing me to participate in the care of your patient,      Sincerely,   Amador Reyes MD  [facility]  03/15/23  11:36 EDT

## 2023-03-16 ENCOUNTER — ANESTHESIA EVENT (OUTPATIENT)
Dept: GASTROENTEROLOGY | Facility: HOSPITAL | Age: 73
End: 2023-03-16
Payer: MEDICARE

## 2023-03-16 ENCOUNTER — ANESTHESIA (OUTPATIENT)
Dept: GASTROENTEROLOGY | Facility: HOSPITAL | Age: 73
End: 2023-03-16
Payer: MEDICARE

## 2023-03-16 ENCOUNTER — HOSPITAL ENCOUNTER (OUTPATIENT)
Facility: HOSPITAL | Age: 73
Setting detail: HOSPITAL OUTPATIENT SURGERY
Discharge: HOME | End: 2023-03-16
Attending: COLON & RECTAL SURGERY | Admitting: COLON & RECTAL SURGERY
Payer: MEDICARE

## 2023-03-16 VITALS
HEART RATE: 84 BPM | BODY MASS INDEX: 28.73 KG/M2 | DIASTOLIC BLOOD PRESSURE: 55 MMHG | WEIGHT: 194 LBS | SYSTOLIC BLOOD PRESSURE: 102 MMHG | OXYGEN SATURATION: 92 % | RESPIRATION RATE: 16 BRPM | HEIGHT: 69 IN

## 2023-03-16 PROCEDURE — 0 LIDOCAINE 1 % SOLUTION: Performed by: NURSE ANESTHETIST, CERTIFIED REGISTERED

## 2023-03-16 PROCEDURE — G0105 COLORECTAL SCRN; HI RISK IND: HCPCS | Performed by: COLON & RECTAL SURGERY

## 2023-03-16 PROCEDURE — 25010000002 PHENYLEPHRINE 10 MG/ML SOLUTION: Performed by: NURSE ANESTHETIST, CERTIFIED REGISTERED

## 2023-03-16 PROCEDURE — 25010000002 PROPOFOL 10 MG/ML EMULSION: Performed by: NURSE ANESTHETIST, CERTIFIED REGISTERED

## 2023-03-16 RX ORDER — PHENYLEPHRINE HYDROCHLORIDE 10 MG/ML
INJECTION INTRAVENOUS AS NEEDED
Status: DISCONTINUED | OUTPATIENT
Start: 2023-03-16 | End: 2023-03-16 | Stop reason: SURG

## 2023-03-16 RX ORDER — LIDOCAINE HYDROCHLORIDE 10 MG/ML
INJECTION, SOLUTION INFILTRATION; PERINEURAL AS NEEDED
Status: DISCONTINUED | OUTPATIENT
Start: 2023-03-16 | End: 2023-03-16 | Stop reason: SURG

## 2023-03-16 RX ORDER — PROPOFOL 10 MG/ML
VIAL (ML) INTRAVENOUS AS NEEDED
Status: DISCONTINUED | OUTPATIENT
Start: 2023-03-16 | End: 2023-03-16 | Stop reason: SURG

## 2023-03-16 RX ORDER — SODIUM CHLORIDE, SODIUM LACTATE, POTASSIUM CHLORIDE, CALCIUM CHLORIDE 600; 310; 30; 20 MG/100ML; MG/100ML; MG/100ML; MG/100ML
30 INJECTION, SOLUTION INTRAVENOUS CONTINUOUS PRN
Status: DISCONTINUED | OUTPATIENT
Start: 2023-03-16 | End: 2023-03-16 | Stop reason: HOSPADM

## 2023-03-16 RX ORDER — SODIUM CHLORIDE 0.9 % (FLUSH) 0.9 %
10 SYRINGE (ML) INJECTION AS NEEDED
Status: DISCONTINUED | OUTPATIENT
Start: 2023-03-16 | End: 2023-03-16 | Stop reason: HOSPADM

## 2023-03-16 RX ADMIN — PROPOFOL 70 MG: 10 INJECTION, EMULSION INTRAVENOUS at 14:47

## 2023-03-16 RX ADMIN — PROPOFOL 140 MCG/KG/MIN: 10 INJECTION, EMULSION INTRAVENOUS at 14:47

## 2023-03-16 RX ADMIN — PHENYLEPHRINE HYDROCHLORIDE 200 MCG: 10 INJECTION INTRAVENOUS at 15:05

## 2023-03-16 RX ADMIN — LIDOCAINE HYDROCHLORIDE 50 MG: 10 INJECTION, SOLUTION INFILTRATION; PERINEURAL at 14:47

## 2023-03-16 RX ADMIN — SODIUM CHLORIDE, POTASSIUM CHLORIDE, SODIUM LACTATE AND CALCIUM CHLORIDE 30 ML/HR: 600; 310; 30; 20 INJECTION, SOLUTION INTRAVENOUS at 14:39

## 2023-03-16 NOTE — H&P
Dilip Verma is a 72 y.o. male  who is referred by Aracelis Ball MD for a colonoscopy. He   has an indications: previous adenomatous polyp.     He denies any change in bowel function, melena, or hematochezia.    Past Medical History:   Diagnosis Date   • AMENA (acute kidney injury) ([facility]) 12/03/2020   • CORI positive    • Anemia    • Anxiety    • Ataxia    • Atypical chest pain 04/19/2022    SEEN AT [facility] ER   • Balance disorder    • Cataract     BILATERAL, S/P EXTRACTION   • Cervical radiculopathy 11/11/2019    SEEN AT  [facility] ER   • Cervical spinal cord compression (HCC)    • Chronic diastolic (congestive) heart failure ([facility])    • Chronic kidney disease     STAGE 3, FOLLOWED BY DR. VIVAR   • Chronic pancreatitis ([facility])    • Closed left subtrochanteric femur fracture ([facility]) 12/01/2020    ADMITTED TO [facility]   • Closed nondisplaced intertrochanteric fracture of left femur ([facility]) 12/01/2020    ADMITTED TO [facility]   • Colon polyps     FOLLOWED BY DR. AVTAR JEONG   • Constipation    • Contracture, right hand    • Coronary artery disease     CABG 7/2019   • COVID-19 07/2022   • DDD (degenerative disc disease), cervical    • Diabetes mellitus, type II ([facility])     IDDM   • Diabetic retinopathy ([facility])    • Dysphagia    • Elevated brain natriuretic peptide (BNP) level 08/2014   • Elevated LFTs 03/2021   • Erectile dysfunction    • Foot drop    • Fuchs' corneal dystrophy of right eye    • Glaucoma     BILATERAL   • History of alcohol abuse    • Hyperlipidemia    • Hypersomnia    • Hypertension    • Hypertensive urgency 02/25/2020    ADMITTED TO [facility]   • Insomnia    • Kidney stones    • LV dysfunction 06/2016   • Lyme disease    • Lymphadenopathy syndrome 05/2021   • Macular edema     BILATERAL   • Myocardial infarction ([facility]) 11/05/2019    NSTEMI, ADMITTED TO [facility]   • Myocardial infarction ([facility]) 07/12/2019    NSTEMI, ADMITTED TO [facility]   • Non-celiac gluten sensitivity    • Orthostasis    • Osteoporosis    • Oxygen dependent    • PAD (peripheral  "artery disease) ([facility])    • PNA (pneumonia) 06/2016    LEFT LOBE   • Polyneuropathy    • PTSD (post-traumatic stress disorder)    • Pulmonary hypertension ([facility])    • Pulmonary nodule    • Senile ectropion of both lower eyelids 02/2022   • Sepsis ([facility]) 12/16/2020    D/T UTI, ADMITTED TO [facility]   • Sleep apnea     STATES DOESN'T USE BIPAP OR CPAP   • Spinal stenosis in cervical region     SEVERE-LIMITED ROM   • Stroke ([facility]) 2012    \"slight stroke\"   • Syncope and collapse 07/06/2019    ADMITTED TO [facility]   • Urinary retention 04/05/2021    SEEN AT [facility] ER   • Vitamin D deficiency        Past Surgical History:   Procedure Laterality Date   • CARDIAC CATHETERIZATION N/A 07/15/2019    Procedure: Coronary angiography;  Surgeon: Carrie Price MD;  Location:  [facility];  Service: Cardiovascular   • CARDIAC CATHETERIZATION N/A 07/15/2019    Procedure: Left Heart Cath;  Surgeon: Carrie Price MD;  Location:  [facility];  Service: Cardiovascular   • CARDIAC CATHETERIZATION N/A 07/15/2019    Procedure: Left ventriculography;  Surgeon: Carrie Price MD;  Location:  [facility];  Service: Cardiovascular   • CARDIAC CATHETERIZATION  07/15/2019    Procedure: Functional Flow [place];  Surgeon: Carrie Price MD;  Location:  [facility];  Service: Cardiovascular   • CARDIAC CATHETERIZATION N/A 11/06/2019    Procedure: Right and Left Heart Cath;  Surgeon: Mya Smith MD;  Location:  [facility];  Service: Cardiovascular   • CARDIAC CATHETERIZATION N/A 11/06/2019    Procedure: Coronary angiography;  Surgeon: Mya Smith MD;  Location:  [facility];  Service: Cardiovascular   • CARDIAC SURGERY     • CATARACT EXTRACTION Left 2014   • CATARACT EXTRACTION Right 11/2016    PHACO/IOL, DR. LEN DENTON   • COLONOSCOPY W/ POLYPECTOMY N/A 01/02/2015    A FEW DIVERTICULA IN SIGMOID, 6 MM TUBULOVILLOUS ADENOMA POLYP IN RECTUM, " SMALL HEMORRHOIDS, MELANOSIS COLI, DR. AVTAR JEONG AT [facility]   • CORONARY ARTERY BYPASS GRAFT N/A 07/18/2019    Procedure: INTRAOPERATIVE SHOAIB; STERNOTOMY CORONARY ARTERY BYPASS x 3  USING LEFT INTERNAL MAMMARY ARTERY GRAFT UTILIZING ENDOSCOPICALLY HARVESTED RIGHT GREATER SAPHENOUS VEIN AND PRP.;  Surgeon: Bill Devi MD;  Location: [facility] OR;  Service: Cardiothoracic   • CYSTOSCOPY BLADDER STONE LITHOTRIPSY N/A    • DENTAL PROCEDURE Bilateral     3 surgeries inder implants   • FEMUR IM NAILING/RODDING Left 12/03/2020    Procedure: LEFT HIP INTRAMEDULLARY NAIL;  Surgeon: Niraj Ravi MD;  Location: [facility] MAIN OR;  Service: Orthopedic Spine;  Laterality: Left;   • INGUINAL HERNIA REPAIR Bilateral    • TOENAIL EXCISION  06/2022   • TONSILLECTOMY Bilateral    • TOTAL HIP ARTHROPLASTY REVISION Left 01/11/2022    Procedure: TOTAL HIP ARTHROPLASTY REVISION- POSTERIOR;  Surgeon: Jurgen Candelaria II, MD;  Location: [facility] OR;  Service: Orthopedics;  Laterality: Left;   • VASECTOMY N/A        Medications Prior to Admission   Medication Sig Dispense Refill Last Dose   • aspirin 81 MG EC tablet Take 1 tablet by mouth Daily. HOLD PRIOR TO SURGERY PER MD INSTRUCTIONS   3/14/2023 at 2100   • bumetanide (BUMEX) 1 MG tablet Take 1 tablet by mouth 2 (Two) Times a Day As Needed (for swelling or high blood pressure).   3/15/2023   • Cholecalciferol (Vitamin D) 125 MCG (5000 UT) capsule capsule Take 1 capsule by mouth Daily.   3/14/2023   • Docusate Sodium 100 MG capsule Daily.   3/13/2023   • Insulin Glargine, 2 Unit Dial, (TOUJEO) 300 UNIT/ML solution pen-injector injection Inject 34 Units under the skin into the appropriate area as directed Daily.   3/14/2023   • insulin lispro (humaLOG) 100 UNIT/ML injection Inject 0-14 Units under the skin into the appropriate area as directed 3 (Three) Times a Day Before Meals. (Patient taking differently: Inject 0-14 Units under the skin into the  appropriate area as directed 3 (Three) Times a Day Before Meals. SLIDING SCALE)  12 3/14/2023   • latanoprost (XALATAN) 0.005 % ophthalmic solution Administer 1 drop to both eyes every night at bedtime.   3/14/2023   • MAGNESIUM PO Take  by mouth.   3/12/2023   • mometasone (ELOCON) 0.1 % ointment Apply 1 application topically to the appropriate area as directed Daily. Do not exceed 1 week, needs to be off 1 week and can restart 15 g 3 3/14/2023   • sildenafil (REVATIO) 20 MG tablet TAKE 1-3 TABLETS 1 HR PRIOR TO SEXUAL ACTIVITY 20 tablet 0 3/14/2023   • testosterone (ANDROGEL) 25 MG/2.5GM (1%) gel gel Place  on the skin as directed by provider Daily.   Past Week   • traMADol (ULTRAM) 50 MG tablet TAKE 1 TABLET BY MOUTH EVERY EIGHT HOURS AS NEEDED FOR SEVERE PAIN 40 tablet 0 3/14/2023   • Unable to find 1 each 1 (One) Time. Cbd pain stick   3/13/2023   • Unable to find 1 each 1 (One) Time. Hemp gummies   3/13/2023   • baclofen (LIORESAL) 10 MG tablet Take 1 tablet by mouth 4 (Four) Times a Day As Needed.   Unknown       Allergies   Allergen Reactions   • Ace Inhibitors Angioedema   • Losartan Angioedema     Angioneurotic edema   • Seroquel [Quetiapine] Hallucinations   • Xanax [Alprazolam] Unknown - High Severity   • Amlodipine Swelling   • Ativan [Lorazepam] Hallucinations   • Minoxidil Confusion   • Tetracycline Rash     bliisters in mouth         Family History   Problem Relation Age of Onset   • Heart disease Mother    • Hypertension Mother    • Hyperlipidemia Mother    • Depression Mother    • Cancer Mother         uterine   • Arrhythmia Mother    • Uterine cancer Mother    • Hyperlipidemia Father    • Heart disease Father    • Hypertension Father    • Kidney disease Father    • Thyroid disease Father    • Heart failure Father    • Depression Sister    • Alcohol abuse Brother    • Thyroid disease Paternal Uncle    • Lung disease Paternal Uncle    • Stroke Maternal Grandmother    • Glaucoma Maternal Grandmother     • Heart attack Paternal Grandmother    • Stroke Paternal Grandmother    • Alcohol abuse Paternal Grandfather    • Malig Hyperthermia Neg Hx        Social History     Socioeconomic History   • Marital status:    Tobacco Use   • Smoking status: Former     Packs/day: 0.75     Years: 16.00     Pack years: 12.00     Types: Cigarettes     Quit date:      Years since quittin.2     Passive exposure: Past   • Smokeless tobacco: Never   • Tobacco comments:     Start age:39/Stopping age:47, 3/4 PPD   Vaping Use   • Vaping Use: Never used   Substance and Sexual Activity   • Alcohol use: Not Currently     Comment: alcohol abuse, none x 25 years / caffeine use - 1 cup daily   • Drug use: No   • Sexual activity: Defer       Review of Systems   Gastrointestinal: Negative for abdominal pain, nausea and vomiting.   All other systems reviewed and are negative.      Vitals:    23 1416   BP: (!) 181/88   Pulse: 80   Resp: 20   SpO2: 92%         Physical Exam  Constitutional:       Appearance: He is well-developed.   HENT:      Head: Normocephalic and atraumatic.   Eyes:      Pupils: Pupils are equal, round, and reactive to light.   Cardiovascular:      Rate and Rhythm: Regular rhythm.   Pulmonary:      Effort: Pulmonary effort is normal.   Abdominal:      General: There is no distension.      Palpations: Abdomen is soft.   Musculoskeletal:         General: Normal range of motion.   Skin:     General: Skin is warm and dry.   Neurological:      Mental Status: He is alert and oriented to person, place, and time.   Psychiatric:         Thought Content: Thought content normal.         Judgment: Judgment normal.           Assessment & Plan      indications: previous adenomatous polyp         I recommend colonoscopy.  I described risks, benefits of the procedure with the patient including but not limited to bleeding, infection, possibility of perforation and possible polypectomy. All of the patient's questions were  answered and they would like to proceed with the above recommendations.

## 2023-03-16 NOTE — ANESTHESIA PREPROCEDURE EVALUATION
Anesthesia Evaluation     Patient summary reviewed and Nursing notes reviewed   no history of anesthetic complications:  NPO Solid Status: > 8 hours  NPO Liquid Status: > 8 hours           Airway   Mallampati: II  TM distance: >3 FB  Neck ROM: limited  No difficulty expected  Dental      Comment: implants and crowns     Pulmonary    (+) sleep apnea,   (-) asthma, rhonchi, decreased breath sounds, wheezes, not a smoker  Cardiovascular   Exercise tolerance: good (4-7 METS)    Rhythm: regular  Rate: normal    (+) hypertension, past MI  >12 months, CAD, CABG >6 Months, CHF Diastolic >=55%, hyperlipidemia,   (-) dysrhythmias, angina, MARTIN, murmur      Neuro/Psych  (+) CVA (right hand ) residual symptoms,    (-) seizures  GI/Hepatic/Renal/Endo    (+)   renal disease (stage 4) CRI, diabetes mellitus,   (-) liver disease, no thyroid disorder    Musculoskeletal     Abdominal     Abdomen: soft.   Substance History      OB/GYN          Other   arthritis,                    Anesthesia Plan    ASA 3     MAC   total IV anesthesia  (Severely Limited ROM of neck)  intravenous induction     Anesthetic plan, risks, benefits, and alternatives have been provided, discussed and informed consent has been obtained with: patient.        CODE STATUS:

## 2023-03-16 NOTE — ANESTHESIA POSTPROCEDURE EVALUATION
"Patient: Dilip Verma    Procedure Summary     Date: 03/16/23 Room / Location:  [facility]    Anesthesia Start: 1442 Anesthesia Stop: 1511    Procedure: COLONOSCOPY to cecum Diagnosis:       Personal history of colonic polyps      (Personal history of colonic polyps [Z86.010])    Surgeons: Aracelis Ball MD Provider: Amador Kumar MD    Anesthesia Type: MAC ASA Status: 3          Anesthesia Type: MAC    Vitals  Vitals Value Taken Time   /55 03/16/23 1525   Temp     Pulse 84 03/16/23 1525   Resp 16 03/16/23 1525   SpO2 92 % 03/16/23 1525           Post Anesthesia Care and Evaluation    Patient location during evaluation: bedside  Patient participation: complete - patient participated  Level of consciousness: awake and alert  Pain management: adequate    Airway patency: patent  Anesthetic complications: No anesthetic complications  PONV Status: controlled  Cardiovascular status: acceptable  Respiratory status: acceptable  Hydration status: acceptable    Comments: /55 (BP Location: Right arm, Patient Position: Sitting)   Pulse 84   Resp 16   Ht 175.3 cm (69\")   Wt 88 kg (194 lb)   SpO2 92%   BMI 28.65 kg/m²         "

## 2023-03-16 NOTE — DISCHARGE INSTRUCTIONS
For the next 24 hours patient needs to be with a responsible adult.    For 24 hours DO NOT drive, operate machinery, appliances, drink alcohol, make important decisions or sign legal documents.    Start with a light or bland diet if you are feeling sick to your stomach otherwise advance to regular diet as tolerated.    Follow recommendations on procedure report if provided by your doctor.    Call Dr Ball for problems [phone]    Problems may include but not limited to: large amounts of bleeding, trouble breathing, repeated vomiting, severe unrelieved pain, fever or chills.

## 2023-03-20 DIAGNOSIS — N18.30 STAGE 3 CHRONIC KIDNEY DISEASE, UNSPECIFIED WHETHER STAGE 3A OR 3B CKD: ICD-10-CM

## 2023-03-20 DIAGNOSIS — I25.10 CORONARY ARTERY DISEASE INVOLVING NATIVE CORONARY ARTERY OF NATIVE HEART WITHOUT ANGINA PECTORIS: ICD-10-CM

## 2023-03-20 DIAGNOSIS — G47.00 INSOMNIA, UNSPECIFIED TYPE: ICD-10-CM

## 2023-03-20 DIAGNOSIS — E78.5 HYPERLIPIDEMIA, UNSPECIFIED HYPERLIPIDEMIA TYPE: ICD-10-CM

## 2023-03-20 DIAGNOSIS — Z00.00 MEDICARE ANNUAL WELLNESS VISIT, SUBSEQUENT: ICD-10-CM

## 2023-03-20 DIAGNOSIS — F43.10 PTSD (POST-TRAUMATIC STRESS DISORDER): ICD-10-CM

## 2023-03-20 DIAGNOSIS — F41.9 ANXIETY: ICD-10-CM

## 2023-03-20 DIAGNOSIS — Z79.4 TYPE 2 DIABETES MELLITUS WITH HYPERGLYCEMIA, WITH LONG-TERM CURRENT USE OF INSULIN: ICD-10-CM

## 2023-03-20 DIAGNOSIS — M25.552 LEFT HIP PAIN: ICD-10-CM

## 2023-03-20 DIAGNOSIS — E11.65 TYPE 2 DIABETES MELLITUS WITH HYPERGLYCEMIA, WITH LONG-TERM CURRENT USE OF INSULIN: ICD-10-CM

## 2023-03-20 DIAGNOSIS — Z86.73 HISTORY OF STROKE: ICD-10-CM

## 2023-03-20 DIAGNOSIS — Z95.1 HX OF CABG: ICD-10-CM

## 2023-03-20 DIAGNOSIS — I10 ESSENTIAL HYPERTENSION: ICD-10-CM

## 2023-03-20 NOTE — TELEPHONE ENCOUNTER
Caller: Beny Verma    Relationship: Emergency Contact    Best call back number:     Requested Prescriptions:   Requested Prescriptions     Pending Prescriptions Disp Refills   • traMADol (ULTRAM) 50 MG tablet 40 tablet 0     Sig: Take 1 tablet by mouth Every 8 (Eight) Hours As Needed for Severe Pain.        Pharmacy where request should be sent:  [facility]  [address]      Additional details provided by patient:     Does the patient have less than a 3 day supply:  [x] Yes  [] No    Would you like a call back once the refill request has been completed: [x] Yes [] No    If the office needs to give you a call back, can they leave a voicemail: [x] Yes [] No    Milton Arroyo Rep   03/20/23 13:30 EDT

## 2023-03-21 RX ORDER — TRAMADOL HYDROCHLORIDE 50 MG/1
50 TABLET ORAL EVERY 8 HOURS PRN
Qty: 40 TABLET | Refills: 0 | Status: SHIPPED | OUTPATIENT
Start: 2023-03-21

## 2023-03-23 NOTE — PROGRESS NOTES
PATIENTINFORMATION    Date of Office Visit: 20  Encounter Provider: Michaela Hylton MD  Place of Service: [facility]  Patient Name: Dilip Verma  : 1950    Subjective:         Patient ID: Dilip Verma is a 70 y.o. male.    This is a gentleman who was hospitalized in 2019.  He has a history of hypertension, chronic diastolic heart failure, hyperlipidemia, obstructive sleep apnea, and diabetes.  He was admitted to [facility] in 2019 for syncope.  He was orthostatic with supine hypertension.  Clonidine was discontinue and hydralazine was increased.  He presented to [facility] on 2019, with uncontrolled blood pressure and blurry vision.  He had an abnormal BNP and troponin.  Echocardiogram on 2019, showed an ejection fraction of 53%, moderate left ventricular hypertrophy, mild to moderate left atrial enlargement, and no significant valvular heart disease.  Holter monitor in 2019 showed some nonsustained atrial tachycardia.  Because of his elevated troponin, he had a heart catheterization on 07/15/2019, which showed normal ejection fraction with severe multivessel coronary disease.  Carotid Doppler showed mild bilateral carotid artery stenosis. Transthoracic echocardiogram showed normal left ventricular function with an ejection fraction of 55%, grade 3 diastolic dysfunction, and no significant valvular heart disease, though severe pulmonary hypertension was noted.  Of note, on his heart catheterization, his left ventricular end-diastolic pressure was 18.  On 2019, he underwent bypass surgery with a left internal mammary artery to the left anterior descending, vein graft to the obtuse marginal one, vein graft to the distal right coronary artery.  He had some post-operative bradycardia and his beta-blocker was held.  He had a brief of post-operative atrial fibrillation and was discharged on amiodarone.      In   "2019, he underwent a stress test, which showed an ejection fraction of 52% with ischemia in the inferior and inferolateral walls.  Echocardiogram at the same time showed normal LV function with an ejection fraction of 62%, stage 2 diastolic dysfunction, mild tricuspid regurgitation with severe pulmonary hypertension.  Repeat heart catheterization in November 2019 showed patent bypass grafts with significant native vessel disease and elevated biventricular filling pressures.  Diuretics were recommended.  He had a follow up appointment with Marcella in January 2020 and at that time, his blood pressure was well controlled.  He was struggling with using a BiPAP machine.  He had no known reoccurrence of atrial fibrillation.  She did not make any significant medication changes.    He is doing ok. No exercise. No Cp, SOA is stable. BP doing OK.        History of Present Illness         Objective:     /83   Pulse 66   Ht 175.3 cm (69\")   Wt 90.7 kg (200 lb)   BMI 29.53 kg/m²  Body mass index is 29.53 kg/m².       Lab Review: reviewed labs 3/20      Assessment/Plan:       1.  Coronary artery disease.  Status post bypass on July 18 of 2019 secondary to multivessel coronary artery disease.  2.  Postoperative atrial fibrillation.  CHADS2-Vasc score of 4.  This was a brief postoperative episode.  Not on anticoagulation or antiarrhythmic.  3.  Hypertension. Managed by nephrology  4.  Hyperlipidemia.  On atorvastatin  5.  Carotid artery disease with bilateral mild stenosis.  6.  Diabetes.    You have chosen to receive care through a telephone visit today. Do you consent to use a telephone visit for your medical care today? Yes.    Telemedicine visit lasted  15 minutes.    FU NP 6 months    No orders of the defined types were placed in this encounter.       Discharge Medications           Accurate as of July 24, 2020 10:54 AM. If you have any questions, ask your nurse or doctor.               Changes to Medications      " Instructions Start Date   gabapentin 300 MG capsule  Commonly known as:  NEURONTIN  What changed:    · when to take this  · reasons to take this   300 mg, Oral, Nightly         Continue These Medications      Instructions Start Date   AndroGel 50 MG/5GM (1%) gel gel  Generic drug:  testosterone   3 Times Weekly      aspirin 81 MG EC tablet   81 mg, Oral, Daily      furosemide 40 MG tablet  Commonly known as:  LASIX   40 mg, Oral, 2 Times Daily      HumaLOG 100 UNIT/ML injection  Generic drug:  insulin lispro   Subcutaneous, 3 Times Daily Before Meals, PER SS -  Currently taking 4 units at noon and 7 units in pm plus 2 units for -250, 4 units for -300, 6 units for -350      hydrALAZINE 25 MG tablet  Commonly known as:  APRESOLINE   25 mg, Oral, 3 Times Daily      melatonin 3 MG tablet   3 mg, Oral, Nightly      pramipexole 0.25 MG tablet  Commonly known as:  MIRAPEX   0.25 mg, Oral, 2 Times Daily PRN      sildenafil 20 MG tablet  Commonly known as:  REVATIO   TAKE 1-3 TABLETS BY MOUTH ONE hour prior to sexual activity, begin with 1 tablet and a max of 3 tablets per day      TOUJEO MAX SOLOSTAR SC   26 Units, Subcutaneous, Daily      vitamin b complex capsule capsule   Oral, Daily      VITAMIN D PO   5,000 Units, Oral, Daily         Stop These Medications    Tresiba FlexTouch 100 UNIT/ML solution pen-injector injection  Generic drug:  insulin degludec  Stopped by:  MD Michaela Wise MD  07/24/20  10:54   (4) no impairment

## 2023-03-31 ENCOUNTER — HOSPITAL ENCOUNTER (OUTPATIENT)
Dept: MRI IMAGING | Facility: HOSPITAL | Age: 73
Discharge: HOME | End: 2023-03-31
Admitting: FAMILY MEDICINE
Payer: MEDICARE

## 2023-03-31 DIAGNOSIS — F98.4 REPETITIVE ROCKING MOVEMENTS: ICD-10-CM

## 2023-03-31 PROCEDURE — 70551 MRI BRAIN STEM W/O DYE: CPT

## 2023-04-21 DIAGNOSIS — I10 ESSENTIAL HYPERTENSION: ICD-10-CM

## 2023-04-21 DIAGNOSIS — Z95.1 HX OF CABG: ICD-10-CM

## 2023-04-21 DIAGNOSIS — F41.9 ANXIETY: ICD-10-CM

## 2023-04-21 DIAGNOSIS — I25.10 CORONARY ARTERY DISEASE INVOLVING NATIVE CORONARY ARTERY OF NATIVE HEART WITHOUT ANGINA PECTORIS: ICD-10-CM

## 2023-04-21 DIAGNOSIS — Z79.4 TYPE 2 DIABETES MELLITUS WITH HYPERGLYCEMIA, WITH LONG-TERM CURRENT USE OF INSULIN: ICD-10-CM

## 2023-04-21 DIAGNOSIS — G47.00 INSOMNIA, UNSPECIFIED TYPE: ICD-10-CM

## 2023-04-21 DIAGNOSIS — Z00.00 MEDICARE ANNUAL WELLNESS VISIT, SUBSEQUENT: ICD-10-CM

## 2023-04-21 DIAGNOSIS — M25.552 LEFT HIP PAIN: ICD-10-CM

## 2023-04-21 DIAGNOSIS — Z86.73 HISTORY OF STROKE: ICD-10-CM

## 2023-04-21 DIAGNOSIS — E78.5 HYPERLIPIDEMIA, UNSPECIFIED HYPERLIPIDEMIA TYPE: ICD-10-CM

## 2023-04-21 DIAGNOSIS — F43.10 PTSD (POST-TRAUMATIC STRESS DISORDER): ICD-10-CM

## 2023-04-21 DIAGNOSIS — N18.30 STAGE 3 CHRONIC KIDNEY DISEASE, UNSPECIFIED WHETHER STAGE 3A OR 3B CKD: ICD-10-CM

## 2023-04-21 DIAGNOSIS — E11.65 TYPE 2 DIABETES MELLITUS WITH HYPERGLYCEMIA, WITH LONG-TERM CURRENT USE OF INSULIN: ICD-10-CM

## 2023-04-22 RX ORDER — TRAMADOL HYDROCHLORIDE 50 MG/1
50 TABLET ORAL EVERY 8 HOURS PRN
Qty: 40 TABLET | Refills: 0 | Status: SHIPPED | OUTPATIENT
Start: 2023-04-22

## 2023-06-04 ENCOUNTER — APPOINTMENT (OUTPATIENT)
Dept: GENERAL RADIOLOGY | Facility: HOSPITAL | Age: 73
DRG: 280 | End: 2023-06-04
Payer: MEDICARE

## 2023-06-04 ENCOUNTER — HOSPITAL ENCOUNTER (INPATIENT)
Facility: HOSPITAL | Age: 73
LOS: 2 days | Discharge: HOME HEALTH | DRG: 280 | End: 2023-06-08
Attending: EMERGENCY MEDICINE | Admitting: HOSPITALIST
Payer: MEDICARE

## 2023-06-04 DIAGNOSIS — N18.9 ACUTE RENAL FAILURE SUPERIMPOSED ON CHRONIC KIDNEY DISEASE, ON CHRONIC DIALYSIS, UNSPECIFIED ACUTE RENAL FAILURE TYPE: ICD-10-CM

## 2023-06-04 DIAGNOSIS — R93.89 ABNORMAL CHEST X-RAY: ICD-10-CM

## 2023-06-04 DIAGNOSIS — I10 SEVERE UNCONTROLLED HYPERTENSION: Primary | ICD-10-CM

## 2023-06-04 DIAGNOSIS — E11.65 UNCONTROLLED TYPE 2 DIABETES MELLITUS WITH HYPERGLYCEMIA: ICD-10-CM

## 2023-06-04 DIAGNOSIS — N18.30 STAGE 3 CHRONIC KIDNEY DISEASE, UNSPECIFIED WHETHER STAGE 3A OR 3B CKD: ICD-10-CM

## 2023-06-04 DIAGNOSIS — Z86.73 HISTORY OF STROKE: ICD-10-CM

## 2023-06-04 DIAGNOSIS — Z74.09 IMPAIRED MOBILITY: ICD-10-CM

## 2023-06-04 DIAGNOSIS — N17.9 ACUTE RENAL FAILURE SUPERIMPOSED ON CHRONIC KIDNEY DISEASE, ON CHRONIC DIALYSIS, UNSPECIFIED ACUTE RENAL FAILURE TYPE: ICD-10-CM

## 2023-06-04 DIAGNOSIS — Z99.2 ACUTE RENAL FAILURE SUPERIMPOSED ON CHRONIC KIDNEY DISEASE, ON CHRONIC DIALYSIS, UNSPECIFIED ACUTE RENAL FAILURE TYPE: ICD-10-CM

## 2023-06-04 DIAGNOSIS — R77.8 ELEVATED TROPONIN: ICD-10-CM

## 2023-06-04 DIAGNOSIS — I50.33 ACUTE ON CHRONIC DIASTOLIC (CONGESTIVE) HEART FAILURE: ICD-10-CM

## 2023-06-04 LAB
ALBUMIN SERPL-MCNC: 3.8 G/DL (ref 3.5–5.2)
ALBUMIN/GLOB SERPL: 1.5 G/DL
ALP SERPL-CCNC: 71 U/L (ref 39–117)
ALT SERPL W P-5'-P-CCNC: 17 U/L (ref 1–41)
ANION GAP SERPL CALCULATED.3IONS-SCNC: 7.5 MMOL/L (ref 5–15)
AST SERPL-CCNC: 14 U/L (ref 1–40)
B PARAPERT DNA SPEC QL NAA+PROBE: NOT DETECTED
B PERT DNA SPEC QL NAA+PROBE: NOT DETECTED
BASOPHILS # BLD AUTO: 0.03 10*3/MM3 (ref 0–0.2)
BASOPHILS NFR BLD AUTO: 0.4 % (ref 0–1.5)
BILIRUB SERPL-MCNC: 0.5 MG/DL (ref 0–1.2)
BUN SERPL-MCNC: 25 MG/DL (ref 8–23)
BUN/CREAT SERPL: 23.8 (ref 7–25)
C PNEUM DNA NPH QL NAA+NON-PROBE: NOT DETECTED
CALCIUM SPEC-SCNC: 8.7 MG/DL (ref 8.6–10.5)
CHLORIDE SERPL-SCNC: 105 MMOL/L (ref 98–107)
CO2 SERPL-SCNC: 28.5 MMOL/L (ref 22–29)
CREAT SERPL-MCNC: 1.05 MG/DL (ref 0.76–1.27)
DEPRECATED RDW RBC AUTO: 44.7 FL (ref 37–54)
EGFRCR SERPLBLD CKD-EPI 2021: 74.5 ML/MIN/1.73
EOSINOPHIL # BLD AUTO: 0.23 10*3/MM3 (ref 0–0.4)
EOSINOPHIL NFR BLD AUTO: 2.7 % (ref 0.3–6.2)
ERYTHROCYTE [DISTWIDTH] IN BLOOD BY AUTOMATED COUNT: 13.1 % (ref 12.3–15.4)
FLUAV SUBTYP SPEC NAA+PROBE: NOT DETECTED
FLUBV RNA ISLT QL NAA+PROBE: NOT DETECTED
GLOBULIN UR ELPH-MCNC: 2.6 GM/DL
GLUCOSE BLDC GLUCOMTR-MCNC: 303 MG/DL (ref 70–130)
GLUCOSE SERPL-MCNC: 330 MG/DL (ref 65–99)
HADV DNA SPEC NAA+PROBE: NOT DETECTED
HCOV 229E RNA SPEC QL NAA+PROBE: NOT DETECTED
HCOV HKU1 RNA SPEC QL NAA+PROBE: NOT DETECTED
HCOV NL63 RNA SPEC QL NAA+PROBE: NOT DETECTED
HCOV OC43 RNA SPEC QL NAA+PROBE: NOT DETECTED
HCT VFR BLD AUTO: 50.3 % (ref 37.5–51)
HGB BLD-MCNC: 16.4 G/DL (ref 13–17.7)
HMPV RNA NPH QL NAA+NON-PROBE: NOT DETECTED
HPIV1 RNA ISLT QL NAA+PROBE: NOT DETECTED
HPIV2 RNA SPEC QL NAA+PROBE: NOT DETECTED
HPIV3 RNA NPH QL NAA+PROBE: NOT DETECTED
HPIV4 P GENE NPH QL NAA+PROBE: NOT DETECTED
IMM GRANULOCYTES # BLD AUTO: 0.02 10*3/MM3 (ref 0–0.05)
IMM GRANULOCYTES NFR BLD AUTO: 0.2 % (ref 0–0.5)
LYMPHOCYTES # BLD AUTO: 1.2 10*3/MM3 (ref 0.7–3.1)
LYMPHOCYTES NFR BLD AUTO: 14.1 % (ref 19.6–45.3)
M PNEUMO IGG SER IA-ACNC: NOT DETECTED
MCH RBC QN AUTO: 30.4 PG (ref 26.6–33)
MCHC RBC AUTO-ENTMCNC: 32.6 G/DL (ref 31.5–35.7)
MCV RBC AUTO: 93.1 FL (ref 79–97)
MONOCYTES # BLD AUTO: 0.56 10*3/MM3 (ref 0.1–0.9)
MONOCYTES NFR BLD AUTO: 6.6 % (ref 5–12)
NEUTROPHILS NFR BLD AUTO: 6.48 10*3/MM3 (ref 1.7–7)
NEUTROPHILS NFR BLD AUTO: 76 % (ref 42.7–76)
NRBC BLD AUTO-RTO: 0 /100 WBC (ref 0–0.2)
NT-PROBNP SERPL-MCNC: 1000 PG/ML (ref 0–900)
PLATELET # BLD AUTO: 209 10*3/MM3 (ref 140–450)
PMV BLD AUTO: 10.5 FL (ref 6–12)
POTASSIUM SERPL-SCNC: 4.2 MMOL/L (ref 3.5–5.2)
PROCALCITONIN SERPL-MCNC: 0.06 NG/ML (ref 0–0.25)
PROT SERPL-MCNC: 6.4 G/DL (ref 6–8.5)
RBC # BLD AUTO: 5.4 10*6/MM3 (ref 4.14–5.8)
RHINOVIRUS RNA SPEC NAA+PROBE: NOT DETECTED
RSV RNA NPH QL NAA+NON-PROBE: NOT DETECTED
SARS-COV-2 RNA NPH QL NAA+NON-PROBE: NOT DETECTED
SODIUM SERPL-SCNC: 141 MMOL/L (ref 136–145)
TROPONIN T SERPL HS-MCNC: 39 NG/L
WBC NRBC COR # BLD: 8.52 10*3/MM3 (ref 3.4–10.8)

## 2023-06-04 PROCEDURE — 99285 EMERGENCY DEPT VISIT HI MDM: CPT

## 2023-06-04 PROCEDURE — 0202U NFCT DS 22 TRGT SARS-COV-2: CPT | Performed by: PHYSICIAN ASSISTANT

## 2023-06-04 PROCEDURE — 71045 X-RAY EXAM CHEST 1 VIEW: CPT

## 2023-06-04 PROCEDURE — 93010 ELECTROCARDIOGRAM REPORT: CPT | Performed by: INTERNAL MEDICINE

## 2023-06-04 PROCEDURE — 84484 ASSAY OF TROPONIN QUANT: CPT | Performed by: PHYSICIAN ASSISTANT

## 2023-06-04 PROCEDURE — 83880 ASSAY OF NATRIURETIC PEPTIDE: CPT | Performed by: PHYSICIAN ASSISTANT

## 2023-06-04 PROCEDURE — 93005 ELECTROCARDIOGRAM TRACING: CPT | Performed by: PHYSICIAN ASSISTANT

## 2023-06-04 PROCEDURE — 80053 COMPREHEN METABOLIC PANEL: CPT | Performed by: PHYSICIAN ASSISTANT

## 2023-06-04 PROCEDURE — 85025 COMPLETE CBC W/AUTO DIFF WBC: CPT | Performed by: PHYSICIAN ASSISTANT

## 2023-06-04 PROCEDURE — 82948 REAGENT STRIP/BLOOD GLUCOSE: CPT

## 2023-06-04 PROCEDURE — 84145 PROCALCITONIN (PCT): CPT | Performed by: PHYSICIAN ASSISTANT

## 2023-06-04 RX ORDER — LABETALOL HYDROCHLORIDE 5 MG/ML
10 INJECTION, SOLUTION INTRAVENOUS ONCE
Status: DISCONTINUED | OUTPATIENT
Start: 2023-06-04 | End: 2023-06-04

## 2023-06-04 RX ORDER — CLONIDINE HYDROCHLORIDE 0.1 MG/1
0.1 TABLET ORAL ONCE
Status: COMPLETED | OUTPATIENT
Start: 2023-06-04 | End: 2023-06-04

## 2023-06-04 RX ADMIN — CLONIDINE HYDROCHLORIDE 0.1 MG: 0.1 TABLET ORAL at 22:36

## 2023-06-05 PROBLEM — Z79.4 TYPE 2 DIABETES MELLITUS WITH DIABETIC NEUROPATHY, WITH LONG-TERM CURRENT USE OF INSULIN: Status: ACTIVE | Noted: 2023-06-05

## 2023-06-05 PROBLEM — I10 SEVERE UNCONTROLLED HYPERTENSION: Status: ACTIVE | Noted: 2023-06-05

## 2023-06-05 PROBLEM — E11.40 TYPE 2 DIABETES MELLITUS WITH DIABETIC NEUROPATHY, WITH LONG-TERM CURRENT USE OF INSULIN: Status: ACTIVE | Noted: 2023-06-05

## 2023-06-05 LAB
ANION GAP SERPL CALCULATED.3IONS-SCNC: 9.6 MMOL/L (ref 5–15)
BILIRUB UR QL STRIP: NEGATIVE
BUN SERPL-MCNC: 29 MG/DL (ref 8–23)
BUN/CREAT SERPL: 22.1 (ref 7–25)
CALCIUM SPEC-SCNC: 9.5 MG/DL (ref 8.6–10.5)
CHLORIDE SERPL-SCNC: 102 MMOL/L (ref 98–107)
CLARITY UR: CLEAR
CO2 SERPL-SCNC: 27.4 MMOL/L (ref 22–29)
COLOR UR: YELLOW
CREAT SERPL-MCNC: 1.31 MG/DL (ref 0.76–1.27)
D-LACTATE SERPL-SCNC: 1.4 MMOL/L (ref 0.5–2)
DEPRECATED RDW RBC AUTO: 44.2 FL (ref 37–54)
EGFRCR SERPLBLD CKD-EPI 2021: 57.1 ML/MIN/1.73
ERYTHROCYTE [DISTWIDTH] IN BLOOD BY AUTOMATED COUNT: 12.9 % (ref 12.3–15.4)
GEN 5 2HR TROPONIN T REFLEX: 43 NG/L
GLUCOSE BLDC GLUCOMTR-MCNC: 112 MG/DL (ref 70–130)
GLUCOSE BLDC GLUCOMTR-MCNC: 298 MG/DL (ref 70–130)
GLUCOSE BLDC GLUCOMTR-MCNC: 329 MG/DL (ref 70–130)
GLUCOSE BLDC GLUCOMTR-MCNC: 361 MG/DL (ref 70–130)
GLUCOSE BLDC GLUCOMTR-MCNC: 365 MG/DL (ref 70–130)
GLUCOSE SERPL-MCNC: 366 MG/DL (ref 65–99)
GLUCOSE UR STRIP-MCNC: ABNORMAL MG/DL
HCT VFR BLD AUTO: 46.7 % (ref 37.5–51)
HGB BLD-MCNC: 16 G/DL (ref 13–17.7)
HGB UR QL STRIP.AUTO: NEGATIVE
KETONES UR QL STRIP: NEGATIVE
LEUKOCYTE ESTERASE UR QL STRIP.AUTO: NEGATIVE
MCH RBC QN AUTO: 31.3 PG (ref 26.6–33)
MCHC RBC AUTO-ENTMCNC: 34.3 G/DL (ref 31.5–35.7)
MCV RBC AUTO: 91.4 FL (ref 79–97)
NITRITE UR QL STRIP: NEGATIVE
PH UR STRIP.AUTO: 6.5 [PH] (ref 5–8)
PLATELET # BLD AUTO: 230 10*3/MM3 (ref 140–450)
PMV BLD AUTO: 10.3 FL (ref 6–12)
POTASSIUM SERPL-SCNC: 4.3 MMOL/L (ref 3.5–5.2)
PROT UR QL STRIP: ABNORMAL
QT INTERVAL: 405 MS
RBC # BLD AUTO: 5.11 10*6/MM3 (ref 4.14–5.8)
SODIUM SERPL-SCNC: 139 MMOL/L (ref 136–145)
SP GR UR STRIP: 1.02 (ref 1–1.03)
TROPONIN T DELTA: 4 NG/L
UROBILINOGEN UR QL STRIP: ABNORMAL
WBC NRBC COR # BLD: 10.68 10*3/MM3 (ref 3.4–10.8)

## 2023-06-05 PROCEDURE — 99214 OFFICE O/P EST MOD 30 MIN: CPT | Performed by: INTERNAL MEDICINE

## 2023-06-05 PROCEDURE — G0378 HOSPITAL OBSERVATION PER HR: HCPCS

## 2023-06-05 PROCEDURE — 94799 UNLISTED PULMONARY SVC/PX: CPT

## 2023-06-05 PROCEDURE — 25010000002 FUROSEMIDE PER 20 MG: Performed by: STUDENT IN AN ORGANIZED HEALTH CARE EDUCATION/TRAINING PROGRAM

## 2023-06-05 PROCEDURE — 82948 REAGENT STRIP/BLOOD GLUCOSE: CPT

## 2023-06-05 PROCEDURE — 83605 ASSAY OF LACTIC ACID: CPT | Performed by: EMERGENCY MEDICINE

## 2023-06-05 PROCEDURE — 63710000001 INSULIN LISPRO (HUMAN) PER 5 UNITS: Performed by: NURSE PRACTITIONER

## 2023-06-05 PROCEDURE — 25010000002 ENOXAPARIN PER 10 MG: Performed by: INTERNAL MEDICINE

## 2023-06-05 PROCEDURE — 85027 COMPLETE CBC AUTOMATED: CPT | Performed by: NURSE PRACTITIONER

## 2023-06-05 PROCEDURE — 84484 ASSAY OF TROPONIN QUANT: CPT | Performed by: PHYSICIAN ASSISTANT

## 2023-06-05 PROCEDURE — 80048 BASIC METABOLIC PNL TOTAL CA: CPT | Performed by: NURSE PRACTITIONER

## 2023-06-05 PROCEDURE — 25010000002 FUROSEMIDE PER 20 MG: Performed by: INTERNAL MEDICINE

## 2023-06-05 PROCEDURE — 81003 URINALYSIS AUTO W/O SCOPE: CPT | Performed by: NURSE PRACTITIONER

## 2023-06-05 PROCEDURE — 36415 COLL VENOUS BLD VENIPUNCTURE: CPT | Performed by: NURSE PRACTITIONER

## 2023-06-05 RX ORDER — NITROGLYCERIN 0.4 MG/1
0.4 TABLET SUBLINGUAL
Status: DISCONTINUED | OUTPATIENT
Start: 2023-06-05 | End: 2023-06-08 | Stop reason: HOSPADM

## 2023-06-05 RX ORDER — LATANOPROST 50 UG/ML
1 SOLUTION/ DROPS OPHTHALMIC NIGHTLY
Status: DISCONTINUED | OUTPATIENT
Start: 2023-06-05 | End: 2023-06-08 | Stop reason: HOSPADM

## 2023-06-05 RX ORDER — HYDRALAZINE HYDROCHLORIDE 25 MG/1
25 TABLET, FILM COATED ORAL EVERY 8 HOURS SCHEDULED
Status: DISCONTINUED | OUTPATIENT
Start: 2023-06-05 | End: 2023-06-07

## 2023-06-05 RX ORDER — BISACODYL 5 MG/1
5 TABLET, DELAYED RELEASE ORAL DAILY PRN
Status: DISCONTINUED | OUTPATIENT
Start: 2023-06-05 | End: 2023-06-08 | Stop reason: HOSPADM

## 2023-06-05 RX ORDER — POLYETHYLENE GLYCOL 3350 17 G/17G
17 POWDER, FOR SOLUTION ORAL DAILY PRN
Status: DISCONTINUED | OUTPATIENT
Start: 2023-06-05 | End: 2023-06-08 | Stop reason: HOSPADM

## 2023-06-05 RX ORDER — FUROSEMIDE 10 MG/ML
40 INJECTION INTRAMUSCULAR; INTRAVENOUS EVERY 8 HOURS
Status: DISCONTINUED | OUTPATIENT
Start: 2023-06-05 | End: 2023-06-07

## 2023-06-05 RX ORDER — ALUMINA, MAGNESIA, AND SIMETHICONE 2400; 2400; 240 MG/30ML; MG/30ML; MG/30ML
15 SUSPENSION ORAL EVERY 6 HOURS PRN
Status: DISCONTINUED | OUTPATIENT
Start: 2023-06-05 | End: 2023-06-08 | Stop reason: HOSPADM

## 2023-06-05 RX ORDER — SODIUM CHLORIDE 0.9 % (FLUSH) 0.9 %
10 SYRINGE (ML) INJECTION AS NEEDED
Status: DISCONTINUED | OUTPATIENT
Start: 2023-06-05 | End: 2023-06-08 | Stop reason: HOSPADM

## 2023-06-05 RX ORDER — OXYMETAZOLINE HYDROCHLORIDE 0.05 G/100ML
2 SPRAY NASAL 2 TIMES DAILY
Status: COMPLETED | OUTPATIENT
Start: 2023-06-05 | End: 2023-06-08

## 2023-06-05 RX ORDER — INSULIN LISPRO 100 [IU]/ML
2-9 INJECTION, SOLUTION INTRAVENOUS; SUBCUTANEOUS
Status: DISCONTINUED | OUTPATIENT
Start: 2023-06-05 | End: 2023-06-08 | Stop reason: HOSPADM

## 2023-06-05 RX ORDER — CHOLECALCIFEROL (VITAMIN D3) 125 MCG
5 CAPSULE ORAL NIGHTLY PRN
Status: DISCONTINUED | OUTPATIENT
Start: 2023-06-05 | End: 2023-06-08 | Stop reason: HOSPADM

## 2023-06-05 RX ORDER — BACLOFEN 10 MG/1
10 TABLET ORAL EVERY 8 HOURS SCHEDULED
Status: DISCONTINUED | OUTPATIENT
Start: 2023-06-05 | End: 2023-06-08 | Stop reason: HOSPADM

## 2023-06-05 RX ORDER — ONDANSETRON 2 MG/ML
4 INJECTION INTRAMUSCULAR; INTRAVENOUS EVERY 6 HOURS PRN
Status: DISCONTINUED | OUTPATIENT
Start: 2023-06-05 | End: 2023-06-08 | Stop reason: HOSPADM

## 2023-06-05 RX ORDER — FUROSEMIDE 10 MG/ML
40 INJECTION INTRAMUSCULAR; INTRAVENOUS EVERY 12 HOURS
Status: DISCONTINUED | OUTPATIENT
Start: 2023-06-05 | End: 2023-06-05

## 2023-06-05 RX ORDER — DEXTROSE MONOHYDRATE 25 G/50ML
25 INJECTION, SOLUTION INTRAVENOUS
Status: DISCONTINUED | OUTPATIENT
Start: 2023-06-05 | End: 2023-06-08 | Stop reason: HOSPADM

## 2023-06-05 RX ORDER — NICOTINE POLACRILEX 4 MG
15 LOZENGE BUCCAL
Status: DISCONTINUED | OUTPATIENT
Start: 2023-06-05 | End: 2023-06-08 | Stop reason: HOSPADM

## 2023-06-05 RX ORDER — LIDOCAINE 50 MG/G
3 PATCH TOPICAL
Status: DISCONTINUED | OUTPATIENT
Start: 2023-06-05 | End: 2023-06-08 | Stop reason: HOSPADM

## 2023-06-05 RX ORDER — ACETAMINOPHEN 325 MG/1
650 TABLET ORAL EVERY 4 HOURS PRN
Status: DISCONTINUED | OUTPATIENT
Start: 2023-06-05 | End: 2023-06-08 | Stop reason: HOSPADM

## 2023-06-05 RX ORDER — ENOXAPARIN SODIUM 100 MG/ML
40 INJECTION SUBCUTANEOUS EVERY 24 HOURS
Status: DISCONTINUED | OUTPATIENT
Start: 2023-06-05 | End: 2023-06-08 | Stop reason: HOSPADM

## 2023-06-05 RX ORDER — IBUPROFEN 600 MG/1
1 TABLET ORAL
Status: DISCONTINUED | OUTPATIENT
Start: 2023-06-05 | End: 2023-06-08 | Stop reason: HOSPADM

## 2023-06-05 RX ORDER — AMOXICILLIN 250 MG
2 CAPSULE ORAL 2 TIMES DAILY
Status: DISCONTINUED | OUTPATIENT
Start: 2023-06-05 | End: 2023-06-08 | Stop reason: HOSPADM

## 2023-06-05 RX ORDER — BISACODYL 10 MG
10 SUPPOSITORY, RECTAL RECTAL DAILY PRN
Status: DISCONTINUED | OUTPATIENT
Start: 2023-06-05 | End: 2023-06-08 | Stop reason: HOSPADM

## 2023-06-05 RX ORDER — ONDANSETRON 4 MG/1
4 TABLET, FILM COATED ORAL EVERY 6 HOURS PRN
Status: DISCONTINUED | OUTPATIENT
Start: 2023-06-05 | End: 2023-06-08 | Stop reason: HOSPADM

## 2023-06-05 RX ORDER — ASPIRIN 81 MG/1
81 TABLET, CHEWABLE ORAL DAILY
Status: DISCONTINUED | OUTPATIENT
Start: 2023-06-05 | End: 2023-06-08 | Stop reason: HOSPADM

## 2023-06-05 RX ORDER — MELATONIN
5000 DAILY
Status: DISCONTINUED | OUTPATIENT
Start: 2023-06-06 | End: 2023-06-08 | Stop reason: HOSPADM

## 2023-06-05 RX ORDER — BUMETANIDE 1 MG/1
1 TABLET ORAL 2 TIMES DAILY PRN
Status: DISCONTINUED | OUTPATIENT
Start: 2023-06-05 | End: 2023-06-05

## 2023-06-05 RX ORDER — TRAMADOL HYDROCHLORIDE 50 MG/1
50 TABLET ORAL EVERY 8 HOURS PRN
Status: DISCONTINUED | OUTPATIENT
Start: 2023-06-05 | End: 2023-06-08 | Stop reason: HOSPADM

## 2023-06-05 RX ADMIN — FUROSEMIDE 40 MG: 40 INJECTION, SOLUTION INTRAMUSCULAR; INTRAVENOUS at 21:15

## 2023-06-05 RX ADMIN — ENOXAPARIN SODIUM 40 MG: 100 INJECTION SUBCUTANEOUS at 15:23

## 2023-06-05 RX ADMIN — INSULIN LISPRO 8 UNITS: 100 INJECTION, SOLUTION INTRAVENOUS; SUBCUTANEOUS at 15:24

## 2023-06-05 RX ADMIN — NICARDIPINE HYDROCHLORIDE 5 MG/HR: 25 INJECTION, SOLUTION INTRAVENOUS at 00:25

## 2023-06-05 RX ADMIN — LIDOCAINE 3 PATCH: 50 PATCH CUTANEOUS at 10:37

## 2023-06-05 RX ADMIN — INSULIN LISPRO 6 UNITS: 100 INJECTION, SOLUTION INTRAVENOUS; SUBCUTANEOUS at 17:30

## 2023-06-05 RX ADMIN — OXYMETAZOLINE HYDROCHLORIDE 2 SPRAY: 0.05 SPRAY NASAL at 21:16

## 2023-06-05 RX ADMIN — DOCUSATE SODIUM 50MG AND SENNOSIDES 8.6MG 2 TABLET: 8.6; 5 TABLET, FILM COATED ORAL at 03:55

## 2023-06-05 RX ADMIN — DOCUSATE SODIUM 50MG AND SENNOSIDES 8.6MG 2 TABLET: 8.6; 5 TABLET, FILM COATED ORAL at 21:15

## 2023-06-05 RX ADMIN — INSULIN GLARGINE-YFGN 27 UNITS: 100 INJECTION, SOLUTION SUBCUTANEOUS at 10:35

## 2023-06-05 RX ADMIN — BACLOFEN 10 MG: 10 TABLET ORAL at 15:23

## 2023-06-05 RX ADMIN — HYDRALAZINE HYDROCHLORIDE 25 MG: 25 TABLET, FILM COATED ORAL at 21:15

## 2023-06-05 RX ADMIN — NICARDIPINE HYDROCHLORIDE 5 MG/HR: 25 INJECTION, SOLUTION INTRAVENOUS at 06:26

## 2023-06-05 RX ADMIN — Medication 10 ML: at 21:16

## 2023-06-05 RX ADMIN — ASPIRIN 81 MG: 81 TABLET, CHEWABLE ORAL at 15:23

## 2023-06-05 RX ADMIN — FUROSEMIDE 40 MG: 20 INJECTION, SOLUTION INTRAMUSCULAR; INTRAVENOUS at 15:21

## 2023-06-05 NOTE — CONSULTS
INITIAL CONSULT NOTE      Name: Dilip Verma ADMIT: 2023   : 1950  PCP: System, Provider Not In    MRN: [id number] LOS: 0 days   AGE/SEX: 73 y.o. male  ROOM: [facility]/1     Date of Service: 2023                        Reason for Consult:         Fluid overload, chronic kidney disease      History of Present Illness:       Patient is a 73-year-old gentleman with past medical history of CKD stage III, heart failure with preserved ejection fraction, CAD, hypertension, T2DM, who is coming in with several days of edema, weight gain of nearly 20 pounds and dyspnea with orthopnea.  Patient reportedly taking bumetanide at home, says he has been adherent.  Recently blood pressure has been rising.  Has gained about 10 to 20 pounds over the past 2 to 3 weeks despite being on diuretics.  They have also noticed a pattern where his diastolic blood pressure has dropped significantly with the Bumex and on those occasions, they have held off taking further diuretics.  He has some mild chronic kidney disease with baseline creatinine around 1.2 mg/dL.  He has chronic respiratory failure and uses 2 L O2 via nasal cannula at night.  He has also noticed some skin complaints recently.  No chest pain  Has had prior CABG in 2019 and has also had history of multiple femur fractures.    On admission here, creatinine was discovered to be 1.31 mg/dL, baseline creatinine around 1.2 to 1.3 mg/dL, had an AMENA in .  Also discovered with elevated proBNP at 1000 ng/mL, but otherwise stable electrolytes and acid-base.  Significant hypertension with systolic blood pressures in the 200s on admission.    Nephrology has been consulted to assist with volume and blood pressure management.      Review of Systems:         Constitutional: No fever, no chills, no lethargy, no weakness.  HEENT:  No headache, otalgia, itchy eyes, nasal discharge or sore throat.  Cardiac:  No chest pain, dyspnea, orthopnea or PND.  Chest:  No cough, phlegm or  wheezing.  Abdomen:  No abdominal pain, nausea or vomiting.  Neuro:  No focal weakness, abnormal movements or seizure-like activity.  :   No hematuria, no pyuria, no dysuria, no flank pain.  ROS was otherwise negative except as mentioned in the [place].       Past medical history, surgical history, social history, family history, allergies and all medications reviewed and agreed.      Past History/Allergies?Social History:     Past Medical History:   Diagnosis Date    AMENA (acute kidney injury) 12/03/2020    CORI positive     Anemia     Anxiety     Ataxia     Atypical chest pain 04/19/2022    SEEN AT [facility] ER    Balance disorder     Cataract     BILATERAL, S/P EXTRACTION    Cervical radiculopathy 11/11/2019    SEEN AT  formerly [facility] ER    Cervical spinal cord compression     Chronic diastolic (congestive) heart failure     Chronic kidney disease     STAGE 3, FOLLOWED BY DR. VIVAR    Chronic pancreatitis     Closed left subtrochanteric femur fracture 12/01/2020    ADMITTED TO [facility]    Closed nondisplaced intertrochanteric fracture of left femur 12/01/2020    ADMITTED TO [facility]    Colon polyps     FOLLOWED BY DR. AVTAR JEONG    Constipation     Contracture, right hand     Coronary artery disease     CABG 7/2019    COVID-19 07/2022    DDD (degenerative disc disease), cervical     Diabetes mellitus, type II     IDDM    Diabetic retinopathy     Dysphagia     Elevated brain natriuretic peptide (BNP) level 08/2014    Elevated LFTs 03/2021    Erectile dysfunction     Foot drop     Fuchs' corneal dystrophy of right eye     Glaucoma     BILATERAL    History of alcohol abuse     Hyperlipidemia     Hypersomnia     Hypertension     Hypertensive urgency 02/25/2020    ADMITTED TO [facility]    Insomnia     Kidney stones     LV dysfunction 06/2016    Lyme disease     Lymphadenopathy syndrome 05/2021    Macular edema     BILATERAL    Myocardial infarction 11/05/2019    NSTEMI, ADMITTED TO [facility]    Myocardial infarction 07/12/2019    NSTEMI, ADMITTED TO [facility]     "Non-celiac gluten sensitivity     Orthostasis     Osteoporosis     Oxygen dependent     PAD (peripheral artery disease)     PNA (pneumonia) 06/2016    LEFT LOBE    Polyneuropathy     PTSD (post-traumatic stress disorder)     Pulmonary hypertension     Pulmonary nodule     Senile ectropion of both lower eyelids 02/2022    Sepsis 12/16/2020    D/T UTI, ADMITTED TO [facility]    Sleep apnea     STATES DOESN'T USE BIPAP OR CPAP    Spinal stenosis in cervical region     SEVERE-LIMITED ROM    Stroke 2012    \"slight stroke\"    Syncope and collapse 07/06/2019    ADMITTED TO [facility]    Urinary retention 04/05/2021    SEEN AT [facility] ER    Vitamin D deficiency          Past Surgical History :     Past Surgical History:   Procedure Laterality Date    CARDIAC CATHETERIZATION N/A 07/15/2019    Procedure: Coronary angiography;  Surgeon: Carrie Price MD;  Location:  [facility];  Service: Cardiovascular    CARDIAC CATHETERIZATION N/A 07/15/2019    Procedure: Left Heart Cath;  Surgeon: Carrie Price MD;  Location:  [facility];  Service: Cardiovascular    CARDIAC CATHETERIZATION N/A 07/15/2019    Procedure: Left ventriculography;  Surgeon: Carrie Price MD;  Location:  [facility];  Service: Cardiovascular    CARDIAC CATHETERIZATION  07/15/2019    Procedure: Functional Flow [place];  Surgeon: Carrie Price MD;  Location:  [facility];  Service: Cardiovascular    CARDIAC CATHETERIZATION N/A 11/06/2019    Procedure: Right and Left Heart Cath;  Surgeon: Mya Smith MD;  Location:  [facility];  Service: Cardiovascular    CARDIAC CATHETERIZATION N/A 11/06/2019    Procedure: Coronary angiography;  Surgeon: Mya Smith MD;  [facility];  Service: Cardiovascular    CARDIAC SURGERY      CATARACT EXTRACTION Left 2014    CATARACT EXTRACTION Right 11/2016    PHACO/IOL, DR. LEN DENTON    COLONOSCOPY N/A 03/16/2023    " ENTIRE COLON WNL, RESCOPE IN 5 YRS, DR. LINDA LIZARRAGA AT [facility]    COLONOSCOPY W/ POLYPECTOMY N/A 01/02/2015    A FEW DIVERTICULA IN SIGMOID, 6 MM TUBULOVILLOUS ADENOMA POLYP IN RECTUM, SMALL HEMORRHOIDS, MELANOSIS COLI, DR. AVTAR JEONG AT [facility]    CORONARY ARTERY BYPASS GRAFT N/A 07/18/2019    Procedure: INTRAOPERATIVE SHOAIB; STERNOTOMY CORONARY ARTERY BYPASS x 3  USING LEFT INTERNAL MAMMARY ARTERY GRAFT UTILIZING ENDOSCOPICALLY HARVESTED RIGHT GREATER SAPHENOUS VEIN AND PRP.;  Surgeon: Bill Devi MD;  Location: [facility] MAIN OR;  Service: Cardiothoracic    CYSTOSCOPY BLADDER STONE LITHOTRIPSY N/A     DENTAL PROCEDURE Bilateral     3 surgeries inder implants    FEMUR IM NAILING/RODDING Left 12/03/2020    Procedure: LEFT HIP INTRAMEDULLARY NAIL;  Surgeon: Niraj Ravi MD;  Location: [facility] MAIN OR;  Service: Orthopedic Spine;  Laterality: Left;    INGUINAL HERNIA REPAIR Bilateral     TOENAIL EXCISION  06/2022    TONSILLECTOMY Bilateral     TOTAL HIP ARTHROPLASTY REVISION Left 01/11/2022    Procedure: TOTAL HIP ARTHROPLASTY REVISION- POSTERIOR;  Surgeon: Jurgen Candelaria II, MD;  [facility];  Service: Orthopedics;  Laterality: Left;    VASECTOMY N/A           Family History:     Family History   Problem Relation Age of Onset    Heart disease Mother     Hypertension Mother     Hyperlipidemia Mother     Depression Mother     Cancer Mother         uterine    Arrhythmia Mother     Uterine cancer Mother     Hyperlipidemia Father     Heart disease Father     Hypertension Father     Kidney disease Father     Thyroid disease Father     Heart failure Father     Depression Sister     Alcohol abuse Brother     Thyroid disease Paternal Uncle     Lung disease Paternal Uncle     Stroke Maternal Grandmother     Glaucoma Maternal Grandmother     Heart attack Paternal Grandmother     Stroke Paternal Grandmother     Alcohol abuse Paternal Grandfather     Malig Hyperthermia Neg Hx           Social  History:     Social History     Tobacco Use    Smoking status: Former     Packs/day: 0.75     Years: 16.00     Pack years: 12.00     Types: Cigarettes     Quit date:      Years since quittin.4     Passive exposure: Past    Smokeless tobacco: Never    Tobacco comments:     Start age:39/Stopping age:47, 3/4 PPD   Substance Use Topics    Alcohol use: Not Currently     Comment: alcohol abuse, none x 25 years / caffeine use - 1 cup daily          Allergies:     Allergies   Allergen Reactions    Ace Inhibitors Angioedema    Losartan Angioedema     Angioneurotic edema    Seroquel [Quetiapine] Hallucinations    Xanax [Alprazolam] Unknown - High Severity    Amlodipine Swelling    Ativan [Lorazepam] Hallucinations    Minoxidil Confusion    Tetracycline Rash     bliisters in mouth           Home meds:     Medications Prior to Admission   Medication Sig Dispense Refill Last Dose    aspirin 81 MG EC tablet Take 1 tablet by mouth Daily. HOLD PRIOR TO SURGERY PER MD INSTRUCTIONS   Past Week    baclofen (LIORESAL) 10 MG tablet Take 1 tablet by mouth 4 (Four) Times a Day As Needed.   Past Week    bumetanide (BUMEX) 1 MG tablet Take 1 tablet by mouth 2 (Two) Times a Day As Needed (for swelling or high blood pressure).   Past Week    Cholecalciferol (Vitamin D) 125 MCG (5000 UT) capsule capsule Take 1 capsule by mouth Daily.   Past Week    Docusate Sodium 100 MG capsule Daily.   2023    Insulin Glargine, 2 Unit Dial, (TOUJEO) 300 UNIT/ML solution pen-injector injection Inject 34 Units under the skin into the appropriate area as directed Daily.       insulin lispro (humaLOG) 100 UNIT/ML injection Inject 0-14 Units under the skin into the appropriate area as directed 3 (Three) Times a Day Before Meals. (Patient taking differently: Inject 0-14 Units under the skin into the appropriate area as directed 3 (Three) Times a Day Before Meals. SLIDING SCALE)  12 Past Week    latanoprost (XALATAN) 0.005 % ophthalmic solution  "Administer 1 drop to both eyes every night at bedtime.   Past Week    MAGNESIUM PO Take  by mouth.   Past Month    Pseudoeph-Doxylamine-DM-APAP (NYQUIL PO) Take  by mouth.       sildenafil (REVATIO) 20 MG tablet TAKE 1-3 TABLETS 1 HR PRIOR TO SEXUAL ACTIVITY 20 tablet 0 Past Month    testosterone (ANDROGEL) 25 MG/2.5GM (1%) gel gel Place  on the skin as directed by provider Daily.   Past Week    traMADol (ULTRAM) 50 MG tablet Take 1 tablet by mouth Every 8 (Eight) Hours As Needed for Severe Pain. 40 tablet 0 Past Week    Unable to find 1 each 1 (One) Time. Cbd pain stick   Past Week    Unable to find 1 each 1 (One) Time. Hemp gummies   Past Week    Unable to find 1 each 1 (One) Time. Relaxium, pt takes one per night   OTC       mometasone (ELOCON) 0.1 % ointment Apply 1 application topically to the appropriate area as directed Daily. Do not exceed 1 week, needs to be off 1 week and can restart 15 g 3          Scheduled meds:   aspirin, 81 mg, Oral, Daily  baclofen, 10 mg, Oral, Q8H  [START ON 2023] cholecalciferol, 5,000 Units, Oral, Daily  enoxaparin, 40 mg, Subcutaneous, Q24H  furosemide, 40 mg, Intravenous, Q8H  insulin glargine, 27 Units, Subcutaneous, Daily  insulin lispro, 2-9 Units, Subcutaneous, 4x Daily AC & at Bedtime  latanoprost, 1 drop, Both Eyes, Nightly  lidocaine, 3 patch, Transdermal, Q24H  senna-docusate sodium, 2 tablet, Oral, BID          Objectives:     tMax 24 hrs: Temp (24hrs), Av °F (36.7 °C), Min:97.7 °F (36.5 °C), Max:98.6 °F (37 °C)      Vitals Ranges:   Temp:  [97.7 °F (36.5 °C)-98.6 °F (37 °C)] 97.8 °F (36.6 °C)  Heart Rate:  [57-85] 73  Resp:  [16-18] 18  BP: ()/() 173/86    Intake and Output Last 3 Shifts:   I/O last 3 completed shifts:  In: -   Out: 250 [Urine:250]      Exam:     /86 (BP Location: Left arm, Patient Position: Lying)   Pulse 73   Temp 97.8 °F (36.6 °C) (Oral)   Resp 18   Ht 175.3 cm (69\")   Wt 89.8 kg (198 lb)   SpO2 94%   BMI 29.24 " kg/m²     GEN: Pleasant chronically ill-appearing elderly gentleman in no acute distress  EYES: PERRLA  HEENT: no abnormality  MOUTH: moist Mucous membranes  NECK: no visible JVD  RESP: clear to auscultation bilaterally, no crackles/wheezing  CVS: RRR, S1, S2+, no murmurs/rubs/gallops  ABD mild distention  NEURO: AAOX3  EXT: 1+ ankle edema's, 1+ bilateral upper extremity edema   SKIN: [place] pink patch over substernal region  JOINT: no visible effusions  PSYCH: pleasant conversational affect      Data Review:       Labs reviewed    Imaging:      Radiology reviewed      Assessment:        Severe uncontrolled hypertension    Type 2 diabetes mellitus with hyperglycemia, with long-term current use of insulin    Hyperlipidemia    CAD (coronary artery disease)    Hx of CABG    HTN (hypertension)    Acute on chronic diastolic (congestive) heart failure    Type 2 diabetes mellitus with diabetic neuropathy, with long-term current use of insulin    Chronic kidney disease stage III  Fluid overload  Uncontrolled hypertension  Heart failure with preserved ejection fraction  Coronary artery disease  Glycosuria      Plan:     Fluid overload in a patient with underlying chronic kidney disease stage III, with significant weight gain of nearly 20 pounds and orthopnea with uncontrolled hypertension.  In need of diuretics, increase IV furosemide 40 mg every 8 hours  Strict input and output monitoring  Patient does not appear to be on any antihypertensives: Suggest start nifedipine 30 mg for blood pressure control, while continuing diuresis.  2 g sodium restricted diet  Daily standing weights  Has angioedema with ACE inhibitors and swelling with amlodipine.  So far, diastolic blood pressure has been acceptable, katherine of only 64 mmHg, SBP significantly elevated at 238 mmHg.  Labile blood pressures, will check orthostatic vitals.  Worry about autonomic dysfunction.  We will check metanephrines.  We will start hydralazine 25 mg every 8  hours with holding parameters for blood pressure control while focusing on diuresis.  Last TTE with LVEF 66% and grade 2 diastolic dysfunction.  Bladder scan to ensure complete bladder emptying, history of urinary retention.    We will follow, thank you for the consultation.      Nya Saez MD  [facility]  6/5/2023  17:00 EDT

## 2023-06-05 NOTE — H&P
Patient Name:  Dilip Verma  YOB: 1950  MRN:  [id number]  Admit Date:  6/4/2023  Patient Care Team:  System, Provider Not In as PCP - General  Morris Cai MD as Consulting Physician (Sleep Medicine)  Michaela Hylton MD as Consulting Physician (Cardiology)  Hardy Banerjee MD as Consulting Physician (Ophthalmology)  Chula Christianson MD as Consulting Physician (Ophthalmology)  Jason Sherman MD (Endocrinology)  Perla Mayen MD as Consulting Physician (Nephrology)  Federico Hebert MD as Consulting Physician (Pulmonary Disease)  Niraj Ravi MD as Consulting Physician (Orthopedic Surgery)  Papa Velasco MD as Consulting Physician (Urology)  Terese Yost MD as Consulting Physician (Gastroenterology)  Aracelis Ball MD as Consulting Physician (Colon and Rectal Surgery)      Subjective   History Present Illness     Chief Complaint   Patient presents with    Hypertension       Mr. Verma is a 73 y.o. former smoker with a history of CAD, CHF, HTN, nocturnal oxygen dependence, diabetes with neuropathy and retinopathy, history CVA that presents to [facility] complaining of flulike symptoms for several days.  He reports fatigue, malaise, chills without fever, headache, sore throat, rhinorrhea but no cough or congestion. He has been more SOB worse with MARTIN, orthopnea, BLE edema. [facility] symptoms and reporting labile 111/57 -199/110. He is supposed to be on his Bumex twice a day but states he does not like taking it for several reasons 1 because it drops his blood pressure and also used to urinate more frequently.  Patient has a neurology appointment next week for evaluation of tremors and confusion.  Wife states he has been a little more confused for several months but nothing acute in the last few days.  He denies chest pain, palpitations, lightheadedness, focal neurologic deficits.  He is impaired mobility  at baseline due to neuropathy and prior CVA and uses a rollator at home. He is followed by [facility] and Dr. Mayen for nephrology.       History of Present Illness  Review of Systems   Constitutional:  Positive for activity change, appetite change, chills and fatigue.   HENT:  Positive for rhinorrhea, sinus pain and sore throat. Negative for congestion and trouble swallowing.    Respiratory:  Positive for chest tightness and shortness of breath. Negative for choking, wheezing and stridor.    Cardiovascular:  Positive for leg swelling. Negative for chest pain and palpitations.   Gastrointestinal:  Negative for abdominal distention, abdominal pain, blood in stool, constipation, diarrhea, nausea and vomiting.   Genitourinary:  Positive for dysuria. Negative for decreased urine volume and frequency.        Burning when he initiates urinating    Musculoskeletal:  Negative for back pain (chronic).   Skin:  Negative for pallor.   Neurological:  Positive for tremors (chronic) and headaches. Negative for dizziness.   Psychiatric/Behavioral:  Positive for confusion (at baseline x several months).       Personal History     Past Medical History:   Diagnosis Date    AMENA (acute kidney injury) 12/03/2020    CORI positive     Anemia     Anxiety     Ataxia     Atypical chest pain 04/19/2022    SEEN AT [facility] ER    Balance disorder     Cataract     BILATERAL, S/P EXTRACTION    Cervical radiculopathy 11/11/2019    SEEN AT  [facility] ER    Cervical spinal cord compression     Chronic diastolic (congestive) heart failure     Chronic kidney disease     STAGE 3, FOLLOWED BY DR. MAYEN    Chronic pancreatitis     Closed left subtrochanteric femur fracture 12/01/2020    ADMITTED TO [facility]    Closed nondisplaced intertrochanteric fracture of left femur 12/01/2020    ADMITTED TO [facility]    Colon polyps     FOLLOWED BY DR. AVTAR JEONG    Constipation     Contracture, right hand     Coronary artery disease     CABG 7/2019    COVID-19  "07/2022    DDD (degenerative disc disease), cervical     Diabetes mellitus, type II     IDDM    Diabetic retinopathy     Dysphagia     Elevated brain natriuretic peptide (BNP) level 08/2014    Elevated LFTs 03/2021    Erectile dysfunction     Foot drop     Fuchs' corneal dystrophy of right eye     Glaucoma     BILATERAL    History of alcohol abuse     Hyperlipidemia     Hypersomnia     Hypertension     Hypertensive urgency 02/25/2020    ADMITTED TO [facility]    Insomnia     Kidney stones     LV dysfunction 06/2016    Lyme disease     Lymphadenopathy syndrome 05/2021    Macular edema     BILATERAL    Myocardial infarction 11/05/2019    NSTEMI, ADMITTED TO [facility]    Myocardial infarction 07/12/2019    NSTEMI, ADMITTED TO [facility]    Non-celiac gluten sensitivity     Orthostasis     Osteoporosis     Oxygen dependent     PAD (peripheral artery disease)     PNA (pneumonia) 06/2016    LEFT LOBE    Polyneuropathy     PTSD (post-traumatic stress disorder)     Pulmonary hypertension     Pulmonary nodule     Senile ectropion of both lower eyelids 02/2022    Sepsis 12/16/2020    D/T UTI, ADMITTED TO [facility]    Sleep apnea     STATES DOESN'T USE BIPAP OR CPAP    Spinal stenosis in cervical region     SEVERE-LIMITED ROM    Stroke 2012    \"slight stroke\"    Syncope and collapse 07/06/2019    ADMITTED TO [place]    Urinary retention 04/05/2021    SEEN AT [facility] ER    Vitamin D deficiency      Past Surgical History:   Procedure Laterality Date    CARDIAC CATHETERIZATION N/A 07/15/2019    Procedure: Coronary angiography;  Surgeon: Carrie Price MD;  Location:  [facility];  Service: Cardiovascular    CARDIAC CATHETERIZATION N/A 07/15/2019    Procedure: Left Heart Cath;  Surgeon: Carrie Price MD;  Location:  [facility];  Service: Cardiovascular    CARDIAC CATHETERIZATION N/A 07/15/2019    Procedure: Left ventriculography;  Surgeon: Carrie Price MD;  Location:  [facility];  Service: " Cardiovascular    CARDIAC CATHETERIZATION  07/15/2019    Procedure: Functional Flow [place];  Surgeon: Carrie Price MD;  Location:  [facility];  Service: Cardiovascular    CARDIAC CATHETERIZATION N/A 11/06/2019    Procedure: Right and Left Heart Cath;  Surgeon: Mya Smith MD;  Location:  [facility];  Service: Cardiovascular    CARDIAC CATHETERIZATION N/A 11/06/2019    Procedure: Coronary angiography;  Surgeon: Mya Smith MD;  [facility];  Service: Cardiovascular    CARDIAC SURGERY      CATARACT EXTRACTION Left 2014    CATARACT EXTRACTION Right 11/2016    PHACO/IOL, DR. LEN DENTON    COLONOSCOPY N/A 03/16/2023    ENTIRE COLON WNL, RESCOPE IN 5 YRS, DR. LINDA LIZARRAGA AT [facility]    COLONOSCOPY W/ POLYPECTOMY N/A 01/02/2015    A FEW DIVERTICULA IN SIGMOID, 6 MM TUBULOVILLOUS ADENOMA POLYP IN RECTUM, SMALL HEMORRHOIDS, MELANOSIS COLI, DR. AVTAR JEONG AT [facility]    CORONARY ARTERY BYPASS GRAFT N/A 07/18/2019    Procedure: INTRAOPERATIVE SHOAIB; STERNOTOMY CORONARY ARTERY BYPASS x 3  USING LEFT INTERNAL MAMMARY ARTERY GRAFT UTILIZING ENDOSCOPICALLY HARVESTED RIGHT GREATER SAPHENOUS VEIN AND PRP.;  Surgeon: Bill Devi MD;  Location: [facility] MAIN OR;  Service: Cardiothoracic    CYSTOSCOPY BLADDER STONE LITHOTRIPSY N/A     DENTAL PROCEDURE Bilateral     3 surgeries inder implants    FEMUR IM NAILING/RODDING Left 12/03/2020    Procedure: LEFT HIP INTRAMEDULLARY NAIL;  Surgeon: Niraj Ravi MD;  Location: [facility] MAIN OR;  Service: Orthopedic Spine;  Laterality: Left;    INGUINAL HERNIA REPAIR Bilateral     TOENAIL EXCISION  06/2022    TONSILLECTOMY Bilateral     TOTAL HIP ARTHROPLASTY REVISION Left 01/11/2022    Procedure: TOTAL HIP ARTHROPLASTY REVISION- POSTERIOR;  Surgeon: Jurgen Candelaria II, MD;  Location:  [facility];  Service: Orthopedics;  Laterality: Left;    VASECTOMY N/A      Family History   Problem Relation Age  of Onset    Heart disease Mother     Hypertension Mother     Hyperlipidemia Mother     Depression Mother     Cancer Mother         uterine    Arrhythmia Mother     Uterine cancer Mother     Hyperlipidemia Father     Heart disease Father     Hypertension Father     Kidney disease Father     Thyroid disease Father     Heart failure Father     Depression Sister     Alcohol abuse Brother     Thyroid disease Paternal Uncle     Lung disease Paternal Uncle     Stroke Maternal Grandmother     Glaucoma Maternal Grandmother     Heart attack Paternal Grandmother     Stroke Paternal Grandmother     Alcohol abuse Paternal Grandfather     Malig Hyperthermia Neg Hx      Social History     Tobacco Use    Smoking status: Former     Packs/day: 0.75     Years: 16.00     Pack years: 12.00     Types: Cigarettes     Quit date:      Years since quittin.4     Passive exposure: Past    Smokeless tobacco: Never    Tobacco comments:     Start age:39/Stopping age:47, 3/4 PPD   Vaping Use    Vaping Use: Never used   Substance Use Topics    Alcohol use: Not Currently     Comment: alcohol abuse, none x 25 years / caffeine use - 1 cup daily    Drug use: No     No current facility-administered medications on file prior to encounter.     Current Outpatient Medications on File Prior to Encounter   Medication Sig Dispense Refill    aspirin 81 MG EC tablet Take 1 tablet by mouth Daily. HOLD PRIOR TO SURGERY PER MD INSTRUCTIONS      baclofen (LIORESAL) 10 MG tablet Take 1 tablet by mouth 4 (Four) Times a Day As Needed.      bumetanide (BUMEX) 1 MG tablet Take 1 tablet by mouth 2 (Two) Times a Day As Needed (for swelling or high blood pressure).      Cholecalciferol (Vitamin D) 125 MCG (5000 UT) capsule capsule Take 1 capsule by mouth Daily.      Docusate Sodium 100 MG capsule Daily.      Insulin Glargine, 2 Unit Dial, (TOUJEO) 300 UNIT/ML solution pen-injector injection Inject 34 Units under the skin into the appropriate area as directed Daily.       insulin lispro (humaLOG) 100 UNIT/ML injection Inject 0-14 Units under the skin into the appropriate area as directed 3 (Three) Times a Day Before Meals. (Patient taking differently: Inject 0-14 Units under the skin into the appropriate area as directed 3 (Three) Times a Day Before Meals. SLIDING SCALE)  12    latanoprost (XALATAN) 0.005 % ophthalmic solution Administer 1 drop to both eyes every night at bedtime.      MAGNESIUM PO Take  by mouth.      Pseudoeph-Doxylamine-DM-APAP (NYQUIL PO) Take  by mouth.      sildenafil (REVATIO) 20 MG tablet TAKE 1-3 TABLETS 1 HR PRIOR TO SEXUAL ACTIVITY 20 tablet 0    testosterone (ANDROGEL) 25 MG/2.5GM (1%) gel gel Place  on the skin as directed by provider Daily.      traMADol (ULTRAM) 50 MG tablet Take 1 tablet by mouth Every 8 (Eight) Hours As Needed for Severe Pain. 40 tablet 0    Unable to find 1 each 1 (One) Time. Cbd pain stick      Unable to find 1 each 1 (One) Time. Hemp gummies      Unable to find 1 each 1 (One) Time. Relaxium, pt takes one per night   OTC      mometasone (ELOCON) 0.1 % ointment Apply 1 application topically to the appropriate area as directed Daily. Do not exceed 1 week, needs to be off 1 week and can restart 15 g 3     Allergies   Allergen Reactions    Ace Inhibitors Angioedema    Losartan Angioedema     Angioneurotic edema    Seroquel [Quetiapine] Hallucinations    Xanax [Alprazolam] Unknown - High Severity    Amlodipine Swelling    Ativan [Lorazepam] Hallucinations    Minoxidil Confusion    Tetracycline Rash     bliisters in mouth         Objective    Objective     Vital Signs  Temp:  [97.7 °F (36.5 °C)-98.6 °F (37 °C)] 97.8 °F (36.6 °C)  Heart Rate:  [57-85] 81  Resp:  [16-18] 18  BP: ()/() 102/36  SpO2:  [90 %-100 %] 92 %  on  Flow (L/min):  [4-5] 5;   Device (Oxygen Therapy): nasal cannula  Body mass index is 29.24 kg/m².    Physical Exam  Vitals and nursing note reviewed.   Constitutional:       General: He is not in acute  distress.     Appearance: He is well-developed. He is obese. He is ill-appearing. He is not toxic-appearing.      Comments: Acute on chronic ill appearing    HENT:      Head: Normocephalic and atraumatic.   Eyes:      Conjunctiva/sclera: Conjunctivae normal.   Neck:      Trachea: No tracheal deviation.   Cardiovascular:      Rate and Rhythm: Normal rate and regular rhythm.   Pulmonary:      Effort: Pulmonary effort is normal. No respiratory distress.      Breath sounds: Normal breath sounds.      Comments: 97% on 5L   Abdominal:      General: Bowel sounds are normal. There is no distension.      Palpations: Abdomen is soft. There is no mass.      Tenderness: There is no abdominal tenderness. There is no guarding or rebound.   Musculoskeletal:         General: Normal range of motion.      Cervical back: Normal range of motion.      Right lower leg: Edema present.      Left lower leg: Edema present.      Comments: +3BLE   Skin:     General: Skin is warm and dry.      Capillary Refill: Capillary refill takes 2 to 3 seconds.   Neurological:      General: No focal deficit present.      Mental Status: He is alert and oriented to person, place, and time.      Comments: Tremulous at rest   Psychiatric:         Cognition and Memory: Cognition is not impaired. Memory is impaired.      Comments: Bizarre affect        Results Review:  I reviewed the patient's new clinical results.  I reviewed the patient's new imaging results and agree with the interpretation.  I reviewed the patient's other test results and agree with the interpretation  I personally viewed and interpreted the patient's EKG/Telemetry data  Discussed with ED provider.    Lab Results (last 24 hours)       Procedure Component Value Units Date/Time    CBC & Differential [[id number]]  (Abnormal) Collected: 06/04/23 2232    Specimen: Blood Updated: 06/04/23 2258    Narrative:      The following orders were created for panel order CBC & Differential.  Procedure                                Abnormality         Status                     ---------                               -----------         ------                     CBC Auto Differential[[id number]]        Abnormal            Final result                 Please view results for these tests on the individual orders.    Comprehensive Metabolic Panel [[id number]]  (Abnormal) Collected: 06/04/23 2232    Specimen: Blood Updated: 06/04/23 2319     Glucose 330 mg/dL      BUN 25 mg/dL      Creatinine 1.05 mg/dL      Sodium 141 mmol/L      Potassium 4.2 mmol/L      Chloride 105 mmol/L      CO2 28.5 mmol/L      Calcium 8.7 mg/dL      Total Protein 6.4 g/dL      Albumin 3.8 g/dL      ALT (SGPT) 17 U/L      AST (SGOT) 14 U/L      Alkaline Phosphatase 71 U/L      Total Bilirubin 0.5 mg/dL      Globulin 2.6 gm/dL      A/G Ratio 1.5 g/dL      BUN/Creatinine Ratio 23.8     Anion Gap 7.5 mmol/L      eGFR 74.5 mL/min/1.73     Narrative:      GFR Normal >60  Chronic Kidney Disease <60  Kidney Failure <15    The GFR formula is only valid for adults with stable renal function between ages 18 and 70.    CBC Auto Differential [[id number]]  (Abnormal) Collected: 06/04/23 2232    Specimen: Blood Updated: 06/04/23 2258     WBC 8.52 10*3/mm3      RBC 5.40 10*6/mm3      Hemoglobin 16.4 g/dL      Hematocrit 50.3 %      MCV 93.1 fL      MCH 30.4 pg      MCHC 32.6 g/dL      RDW 13.1 %      RDW-SD 44.7 fl      MPV 10.5 fL      Platelets 209 10*3/mm3      Neutrophil % 76.0 %      Lymphocyte % 14.1 %      Monocyte % 6.6 %      Eosinophil % 2.7 %      Basophil % 0.4 %      Immature Grans % 0.2 %      Neutrophils, Absolute 6.48 10*3/mm3      Lymphocytes, Absolute 1.20 10*3/mm3      Monocytes, Absolute 0.56 10*3/mm3      Eosinophils, Absolute 0.23 10*3/mm3      Basophils, Absolute 0.03 10*3/mm3      Immature Grans, Absolute 0.02 10*3/mm3      nRBC 0.0 /100 WBC     High Sensitivity Troponin T [[id number]]  (Abnormal) Collected: 06/04/23 2232    Specimen: Blood  "Updated: 06/04/23 2325     HS Troponin T 39 ng/L     Narrative:      High Sensitive Troponin T Reference Range:  <10.0 ng/L- Negative Female for AMI  <15.0 ng/L- Negative Male for AMI  >=10 - Abnormal Female indicating possible myocardial injury.  >=15 - Abnormal Male indicating possible myocardial injury.   Clinicians would have to utilize clinical acumen, EKG, Troponin, and serial changes to determine if it is an Acute Myocardial Infarction or myocardial injury due to an underlying chronic condition.         BNP [[id number]]  (Abnormal) Collected: 06/04/23 2232    Specimen: Blood Updated: 06/04/23 2325     proBNP 1,000.0 pg/mL     Narrative:      Among patients with dyspnea, NT-proBNP is highly sensitive for the detection of acute congestive heart failure. In addition NT-proBNP of <300 pg/ml effectively rules out acute congestive heart failure with 99% negative predictive value.    Results may be falsely decreased if patient taking Biotin.      Procalcitonin [[id number]]  (Normal) Collected: 06/04/23 2232    Specimen: Blood Updated: 06/04/23 2325     Procalcitonin 0.06 ng/mL     Narrative:      As a Marker for Sepsis (Non-Neonates):    1. <0.5 ng/mL represents a low risk of severe sepsis and/or septic shock.  2. >2 ng/mL represents a high risk of severe sepsis and/or septic shock.    As a Marker for Lower Respiratory Tract Infections that require antibiotic therapy:    PCT on Admission    Antibiotic Therapy       6-12 Hrs later    >0.5                Strongly Recommended  >0.25 - <0.5        Recommended   0.1 - 0.25          Discouraged              Remeasure/reassess PCT  <0.1                Strongly Discouraged     Remeasure/reassess PCT    As 28 day mortality risk marker: \"Change in Procalcitonin Result\" (>80% or <=80%) if Day 0 (or Day 1) and Day 4 values are available. Refer to http://www.UTILICASEs-pct-calculator.com    Change in PCT <=80%  A decrease of PCT levels below or equal to 80% defines a positive change " in PCT test result representing a higher risk for 28-day all-cause mortality of patients diagnosed with severe sepsis for septic shock.    Change in PCT >80%  A decrease of PCT levels of more than 80% defines a negative change in PCT result representing a lower risk for 28-day all-cause mortality of patients diagnosed with severe sepsis or septic shock.       Respiratory Panel PCR w/COVID-19(SARS-CoV-2) [facility] In-House, NP Swab in UTM/VTM, 3-4 HR TAT - Swab, Nasopharynx [[id number]]  (Normal) Collected: 06/04/23 2233    Specimen: Swab from Nasopharynx Updated: 06/04/23 2342     ADENOVIRUS, PCR Not Detected     Coronavirus 229E Not Detected     Coronavirus HKU1 Not Detected     Coronavirus NL63 Not Detected     Coronavirus OC43 Not Detected     COVID19 Not Detected     Human Metapneumovirus Not Detected     Human Rhinovirus/Enterovirus Not Detected     Influenza A PCR Not Detected     Influenza B PCR Not Detected     Parainfluenza Virus 1 Not Detected     Parainfluenza Virus 2 Not Detected     Parainfluenza Virus 3 Not Detected     Parainfluenza Virus 4 Not Detected     RSV, PCR Not Detected     Bordetella pertussis pcr Not Detected     Bordetella parapertussis PCR Not Detected     Chlamydophila pneumoniae PCR Not Detected     Mycoplasma pneumo by PCR Not Detected    Narrative:      In the setting of a positive respiratory panel with a viral infection PLUS a negative procalcitonin without other underlying concern for bacterial infection, consider observing off antibiotics or discontinuation of antibiotics and continue supportive care. If the respiratory panel is positive for atypical bacterial infection (Bordetella pertussis, Chlamydophila pneumoniae, or Mycoplasma pneumoniae), consider antibiotic de-escalation to target atypical bacterial infection.    POC Glucose Once [[id number]]  (Abnormal) Collected: 06/04/23 2355    Specimen: Blood Updated: 06/04/23 2356     Glucose 303 mg/dL      Comment:  Meter: [id number] : [id number] Doug Hernandez       Lactic Acid, Plasma [[id number]]  (Normal) Collected: 06/05/23 0023    Specimen: Blood Updated: 06/05/23 0050     Lactate 1.4 mmol/L     High Sensitivity Troponin T 2Hr [[id number]]  (Abnormal) Collected: 06/05/23 0023    Specimen: Blood Updated: 06/05/23 0105     HS Troponin T 43 ng/L      Troponin T Delta 4 ng/L     Narrative:      High Sensitive Troponin T Reference Range:  <10.0 ng/L- Negative Female for AMI  <15.0 ng/L- Negative Male for AMI  >=10 - Abnormal Female indicating possible myocardial injury.  >=15 - Abnormal Male indicating possible myocardial injury.   Clinicians would have to utilize clinical acumen, EKG, Troponin, and serial changes to determine if it is an Acute Myocardial Infarction or myocardial injury due to an underlying chronic condition.         Basic Metabolic Panel [[id number]]  (Abnormal) Collected: 06/05/23 0719    Specimen: Blood Updated: 06/05/23 0753     Glucose 366 mg/dL      BUN 29 mg/dL      Creatinine 1.31 mg/dL      Sodium 139 mmol/L      Potassium 4.3 mmol/L      Chloride 102 mmol/L      CO2 27.4 mmol/L      Calcium 9.5 mg/dL      BUN/Creatinine Ratio 22.1     Anion Gap 9.6 mmol/L      eGFR 57.1 mL/min/1.73     Narrative:      GFR Normal >60  Chronic Kidney Disease <60  Kidney Failure <15    The GFR formula is only valid for adults with stable renal function between ages 18 and 70.    CBC (No Diff) [[id number]]  (Normal) Collected: 06/05/23 0719    Specimen: Blood Updated: 06/05/23 0731     WBC 10.68 10*3/mm3      RBC 5.11 10*6/mm3      Hemoglobin 16.0 g/dL      Hematocrit 46.7 %      MCV 91.4 fL      MCH 31.3 pg      MCHC 34.3 g/dL      RDW 12.9 %      RDW-SD 44.2 fl      MPV 10.3 fL      Platelets 230 10*3/mm3     POC Glucose Once [[id number]]  (Abnormal) Collected: 06/05/23 0956    Specimen: Blood Updated: 06/05/23 0957     Glucose 329 mg/dL      Comment: Meter: [id number] : [id number] Addison Cruz RN                Imaging Results (Last 24 Hours)       Procedure Component Value Units Date/Time    XR Chest 1 View [[id number]] Collected: 06/04/23 2238     Updated: 06/04/23 2242    Narrative:      XR CHEST 1 VW-  06/04/2023     HISTORY: Cough.     Heart size is within normal limits. There is mild to moderate patchy  area of atelectasis or developing pneumonia in the left lung base  similar which has a linear component. There are some minimal atelectasis  or developing infiltrate in the right lung base. Sternotomy wires are  seen.       Impression:      1. Bibasilar atelectasis and/or developing pneumonia left greater than  right.     This report was finalized on 6/4/2023 10:39 PM by Dr. Tad Godfrey M.D.               Results for orders placed during the hospital encounter of 10/05/22    Adult Transthoracic Echo Complete W/ Cont if Necessary Per Protocol    Interpretation Summary  · Calculated left ventricular EF = 66.1% Estimated left ventricular EF was in agreement with the calculated left ventricular EF. Left ventricular systolic function is normal.Left ventricular wall thickness is consistent with mild concentric hypertrophy.Abnormal global longitudinal LV strain (GLS) = -12.3%.  · Left ventricular diastolic function is consistent with (grade II w/high LAP) pseudonormalization.Elevated left atrial pressure.  · The aortic valve exhibits sclerosis. There is restricted mobility of the right coronary cusp. There is no significant stenosis or regurgitation noted.      ECG 12 Lead Other; htn   Preliminary Result   HEART RATE= 82  bpm   RR Interval= 732  ms   NM Interval= 164  ms   P Horizontal Axis= -3  deg   P Front Axis= 40  deg   QRSD Interval= 128  ms   QT Interval= 405  ms   QRS Axis= 87  deg   T Wave Axis= 255  deg   - ABNORMAL ECG -   Sinus rhythm   Probable left atrial enlargement   Right bundle branch block   Consider left ventricular hypertrophy   Repol abnrm suggests ischemia, diffuse leads   Electronically  "Signed By:    Date and Time of Study: 2023-06-04 22:12:25           Assessment/Plan     Active Hospital Problems    Diagnosis  POA    **Severe uncontrolled hypertension [I10]  Yes    Type 2 diabetes mellitus with diabetic neuropathy, with long-term current use of insulin [E11.40, Z79.4]  Not Applicable    Acute on chronic diastolic (congestive) heart failure [I50.33]  Yes    HTN (hypertension) [I10]  Yes    Hx of CABG [Z95.1]  Not Applicable    CAD (coronary artery disease) [I25.10]  Yes    Type 2 diabetes mellitus with hyperglycemia, with long-term current use of insulin [E11.65, Z79.4]  Not Applicable    Hyperlipidemia [E78.5]  Yes      Resolved Hospital Problems   No resolved problems to display.       Mr. Verma is a 73 y.o. former smoker with a history of CAD CHF DM2 who URI symptoms and elevated BP with BLE edema non compliant with Bumex.     Severe uncontrolled HTN/ acute on chronic diastolic CHF  Edema, orthopnea and MARTIN. On home bumex PRN but doubt taking adequate amounts. Consult nephrology and cardiology.   /120 on admission, placed on cardene gtt SBP now 90s with clonidine then cardene gtt, RN notified. Defer diuresis to specialists given current hypotension, cardene held  Daily weight IOs  Currently on oxygen.  He was at 100% on 6 L in the ER [facility] as tolerated.     CKD- followed by Dr ABDI Mayen and crt is now 1.31 which is closer to baseline in 2022 but on yesterday admission it was 1.05 similar to the 2 prior values.     URI symptoms   RVP normal, CXR with PNA vs atelectasias but afebrile, procal with normal WBC. Hold on abx. Monitor     CAD/ hx CABG/ Pulmonary hypertension  Trop 39 elevated and EKG that looks unchanged without chest pain. Per last cardiology OV note \"[id number] with LIMA to LAD, vein graft to obtuse marginal 1, vein graft to distal right coronary artery with normal left ventricular systolic function.  Then non-STEMI and  inferolateral wall ischemia on perfusion stress test.  " "Cardiac catheterization 2019 showed patent grafts normal left ventricular systolic function. Preserved LV function on echo 04/2021\"    DM2 with neuropathy and retinopathy  Glucose elevated.  Check A1c.  Avoid hypoglycemia.  Resume home Toujeo and correctional insulin.    Chronic back pain  Lidoderm patches    History of CVA/tremors  Patient is appoint with neurology on 6/14.  Likely not far from his cognitive baseline at this time. 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  On oxygen at night only.           I discussed the patient's findings and my recommendations with patient, spouse, family, nursing staff, and ED provider.    VTE Prophylaxis - Pharmacy to dose Lovenox.  Code Status - Full code.       LEIDY Taveras  [facility]  06/05/23  11:20 EDT    Addendum 1130: BP increased. Resume IV diuretics.   "

## 2023-06-05 NOTE — ED PROVIDER NOTES
" EMERGENCY DEPARTMENT ENCOUNTER    Room Number:  07/07  Date seen:  6/5/2023  PCP: System, Provider Not In  Discussed/ obtained information from independent historians: patient      HPI:  Chief Complaint: high blood pressure  A complete HPI/ROS/PMH/PSH/SH/FH are unobtainable due to: none  Context: Dilip Verma is a 73 y.o. male with CHF, hypertension, chronic respiratory failure on 4 L of O2 continuously who presents to the ED c/o high blood pressure and mild upper respiratory symptoms for the last 3 to 4 days.  He reports a history of high blood pressure, states his blood pressure varies from 110 systolic to 200 and systolic when he checks it at home.  He takes Bumex \"when it is high.\"  It appears his Bumex as prescribed twice daily and he estimates he is taken about 11 doses in the last 14 days.  He denies any increase in his chronic lower extremity edema or any increased dyspnea.  He states he has had some flulike symptoms for last 3 to 4 days      External (non-ED) record review: Underwent a left total hip replacement on 1/11/2022 with Dr. Candelaria due to ununited subtrochanteric hip fracture.      PAST MEDICAL HISTORY  Active Ambulatory Problems     Diagnosis Date Noted    Anxiety     Colon polyp     Type 2 diabetes mellitus with hyperglycemia, with long-term current use of insulin     Erectile dysfunction     Hyperlipidemia     CAD (coronary artery disease) 07/20/2019    Hx of CABG 08/19/2019    Acute on chronic diastolic CHF (congestive heart failure) 09/11/2019    Hypersomnia due to medical condition 09/14/2019    CSA (central sleep apnea) 09/14/2019    Periodic breathing 09/14/2019    HTN (hypertension) 02/26/2020    History of stroke 12/01/2020    CKD (chronic kidney disease) stage 3, GFR 30-59 ml/min 12/01/2020    Urinary retention 12/02/2020    Acute on chronic renal failure 12/03/2020    Acute on chronic diastolic (congestive) heart failure 04/12/2021    Bacteriuria 04/16/2021    Rectal pain 04/16/2021 "    Status post total replacement of hip 01/11/2022    Vitamin D deficiency 12/29/2022    Post-acute COVID-19 syndrome 12/29/2022    Insulin dose changed 12/29/2022    Arthropathy associated with neurological disorder 12/29/2022    Personal history of colonic polyps 01/30/2023     Resolved Ambulatory Problems     Diagnosis Date Noted    NSTEMI (non-ST elevated myocardial infarction) 07/12/2019    Orthostasis 07/20/2019    Closed nondisplaced intertrochanteric fracture of left femur 12/01/2020    Acute posthemorrhagic anemia 12/04/2020    Complicated UTI (urinary tract infection) 12/16/2020     Past Medical History:   Diagnosis Date    AMENA (acute kidney injury) 12/03/2020    CORI positive     Anemia     Ataxia     Atypical chest pain 04/19/2022    Balance disorder     Cataract     Cervical radiculopathy 11/11/2019    Cervical spinal cord compression     Chronic diastolic (congestive) heart failure     Chronic kidney disease     Chronic pancreatitis     Closed left subtrochanteric femur fracture 12/01/2020    Colon polyps     Constipation     Contracture, right hand     Coronary artery disease     COVID-19 07/2022    DDD (degenerative disc disease), cervical     Diabetes mellitus, type II     Diabetic retinopathy     Dysphagia     Elevated brain natriuretic peptide (BNP) level 08/2014    Elevated LFTs 03/2021    Foot drop     Fuchs' corneal dystrophy of right eye     Glaucoma     History of alcohol abuse     Hypersomnia     Hypertension     Hypertensive urgency 02/25/2020    Insomnia     Kidney stones     LV dysfunction 06/2016    Lyme disease     Lymphadenopathy syndrome 05/2021    Macular edema     Myocardial infarction 11/05/2019    Myocardial infarction 07/12/2019    Non-celiac gluten sensitivity     Osteoporosis     Oxygen dependent     PAD (peripheral artery disease)     PNA (pneumonia) 06/2016    Polyneuropathy     PTSD (post-traumatic stress disorder)     Pulmonary hypertension     Pulmonary nodule     Senile  ectropion of both lower eyelids 02/2022    Sepsis 12/16/2020    Sleep apnea     Spinal stenosis in cervical region     Stroke 2012    Syncope and collapse 07/06/2019         PAST SURGICAL HISTORY  Past Surgical History:   Procedure Laterality Date    CARDIAC CATHETERIZATION N/A 07/15/2019    Procedure: Coronary angiography;  Surgeon: Carrie Price MD;  Location:  [facility];  Service: Cardiovascular    CARDIAC CATHETERIZATION N/A 07/15/2019    Procedure: Left Heart Cath;  Surgeon: Carrie Price MD;  Location:  [facility];  Service: Cardiovascular    CARDIAC CATHETERIZATION N/A 07/15/2019    Procedure: Left ventriculography;  Surgeon: Carrie Price MD;  Location:  [facility];  Service: Cardiovascular    CARDIAC CATHETERIZATION  07/15/2019    Procedure: Functional Flow [place];  Surgeon: Carrie Price MD;  Location:  [facility];  Service: Cardiovascular    CARDIAC CATHETERIZATION N/A 11/06/2019    Procedure: Right and Left Heart Cath;  Surgeon: Mya Smith MD;  Location:  [facility];  Service: Cardiovascular    CARDIAC CATHETERIZATION N/A 11/06/2019    Procedure: Coronary angiography;  Surgeon: Mya Smith MD;  Location:  [facility];  Service: Cardiovascular    CARDIAC SURGERY      CATARACT EXTRACTION Left 2014    CATARACT EXTRACTION Right 11/2016    PHACO/IOL, DR. LEN DENTON    COLONOSCOPY N/A 03/16/2023    ENTIRE COLON WNL, RESCOPE IN 5 YRS, DR. LINDA LIZARRAGA AT [facility]    COLONOSCOPY W/ POLYPECTOMY N/A 01/02/2015    A FEW DIVERTICULA IN SIGMOID, 6 MM TUBULOVILLOUS ADENOMA POLYP IN RECTUM, SMALL HEMORRHOIDS, MELANOSIS COLI, DR. AVTAR JEONG AT [facility]    CORONARY ARTERY BYPASS GRAFT N/A 07/18/2019    Procedure: INTRAOPERATIVE SHOAIB; STERNOTOMY CORONARY ARTERY BYPASS x 3  USING LEFT INTERNAL MAMMARY ARTERY GRAFT UTILIZING ENDOSCOPICALLY HARVESTED RIGHT GREATER SAPHENOUS VEIN AND PRP.;   Surgeon: Bill Devi MD;  Location: [facility] MAIN OR;  Service: Cardiothoracic    CYSTOSCOPY BLADDER STONE LITHOTRIPSY N/A     DENTAL PROCEDURE Bilateral     3 surgeries inder implants    FEMUR IM NAILING/RODDING Left 2020    Procedure: LEFT HIP INTRAMEDULLARY NAIL;  Surgeon: Niraj Ravi MD;  Location: [facility] MAIN OR;  Service: Orthopedic Spine;  Laterality: Left;    INGUINAL HERNIA REPAIR Bilateral     TOENAIL EXCISION  2022    TONSILLECTOMY Bilateral     TOTAL HIP ARTHROPLASTY REVISION Left 2022    Procedure: TOTAL HIP ARTHROPLASTY REVISION- POSTERIOR;  Surgeon: Jurgen Candelaria II, MD;  Location: [facility] MAIN OR;  Service: Orthopedics;  Laterality: Left;    VASECTOMY N/A          FAMILY HISTORY  Family History   Problem Relation Age of Onset    Heart disease Mother     Hypertension Mother     Hyperlipidemia Mother     Depression Mother     Cancer Mother         uterine    Arrhythmia Mother     Uterine cancer Mother     Hyperlipidemia Father     Heart disease Father     Hypertension Father     Kidney disease Father     Thyroid disease Father     Heart failure Father     Depression Sister     Alcohol abuse Brother     Thyroid disease Paternal Uncle     Lung disease Paternal Uncle     Stroke Maternal Grandmother     Glaucoma Maternal Grandmother     Heart attack Paternal Grandmother     Stroke Paternal Grandmother     Alcohol abuse Paternal Grandfather     Malig Hyperthermia Neg Hx          SOCIAL HISTORY  Social History     Socioeconomic History    Marital status:    Tobacco Use    Smoking status: Former     Packs/day: 0.75     Years: 16.00     Pack years: 12.00     Types: Cigarettes     Quit date:      Years since quittin.4     Passive exposure: Past    Smokeless tobacco: Never    Tobacco comments:     Start age:39/Stopping age:47, 3/4 PPD   Vaping Use    Vaping Use: Never used   Substance and Sexual Activity    Alcohol use: Not Currently     Comment: alcohol abuse,  none x 25 years / caffeine use - 1 cup daily    Drug use: No    Sexual activity: Defer         ALLERGIES  Ace inhibitors, Losartan, Seroquel [quetiapine], Xanax [alprazolam], Amlodipine, Ativan [lorazepam], Minoxidil, and Tetracycline        REVIEW OF SYSTEMS  Review of Systems         PHYSICAL EXAM  ED Triage Vitals [06/04/23 2114]   Temp Heart Rate Resp BP SpO2   -- 84 16 (!) 194/96 94 %      Temp src Heart Rate Source Patient Position BP Location FiO2 (%)   -- -- -- -- --       Physical Exam      GENERAL: Chronically ill-appearing no acute distress  HENT: normocephalic, atraumatic  EYES: no scleral icterus  CV: regular rhythm, normal rate, 2+ pedal edema  RESPIRATORY: normal effort, fine diffuse crackles, no wheezes  ABDOMEN: nondistended, soft, nontender  MUSCULOSKELETAL: no deformity  NEURO: alert, moves all extremities, follows commands  PSYCH:  calm, cooperative  SKIN: warm, dry    Vital signs and nursing notes reviewed.          LAB RESULTS  Recent Results (from the past 24 hour(s))   ECG 12 Lead Other; htn    Collection Time: 06/04/23 10:12 PM   Result Value Ref Range    QT Interval 405 ms   Comprehensive Metabolic Panel    Collection Time: 06/04/23 10:32 PM    Specimen: Blood   Result Value Ref Range    Glucose 330 (H) 65 - 99 mg/dL    BUN 25 (H) 8 - 23 mg/dL    Creatinine 1.05 0.76 - 1.27 mg/dL    Sodium 141 136 - 145 mmol/L    Potassium 4.2 3.5 - 5.2 mmol/L    Chloride 105 98 - 107 mmol/L    CO2 28.5 22.0 - 29.0 mmol/L    Calcium 8.7 8.6 - 10.5 mg/dL    Total Protein 6.4 6.0 - 8.5 g/dL    Albumin 3.8 3.5 - 5.2 g/dL    ALT (SGPT) 17 1 - 41 U/L    AST (SGOT) 14 1 - 40 U/L    Alkaline Phosphatase 71 39 - 117 U/L    Total Bilirubin 0.5 0.0 - 1.2 mg/dL    Globulin 2.6 gm/dL    A/G Ratio 1.5 g/dL    BUN/Creatinine Ratio 23.8 7.0 - 25.0    Anion Gap 7.5 5.0 - 15.0 mmol/L    eGFR 74.5 >60.0 mL/min/1.73   CBC Auto Differential    Collection Time: 06/04/23 10:32 PM    Specimen: Blood   Result Value Ref Range     WBC 8.52 3.40 - 10.80 10*3/mm3    RBC 5.40 4.14 - 5.80 10*6/mm3    Hemoglobin 16.4 13.0 - 17.7 g/dL    Hematocrit 50.3 37.5 - 51.0 %    MCV 93.1 79.0 - 97.0 fL    MCH 30.4 26.6 - 33.0 pg    MCHC 32.6 31.5 - 35.7 g/dL    RDW 13.1 12.3 - 15.4 %    RDW-SD 44.7 37.0 - 54.0 fl    MPV 10.5 6.0 - 12.0 fL    Platelets 209 140 - 450 10*3/mm3    Neutrophil % 76.0 42.7 - 76.0 %    Lymphocyte % 14.1 (L) 19.6 - 45.3 %    Monocyte % 6.6 5.0 - 12.0 %    Eosinophil % 2.7 0.3 - 6.2 %    Basophil % 0.4 0.0 - 1.5 %    Immature Grans % 0.2 0.0 - 0.5 %    Neutrophils, Absolute 6.48 1.70 - 7.00 10*3/mm3    Lymphocytes, Absolute 1.20 0.70 - 3.10 10*3/mm3    Monocytes, Absolute 0.56 0.10 - 0.90 10*3/mm3    Eosinophils, Absolute 0.23 0.00 - 0.40 10*3/mm3    Basophils, Absolute 0.03 0.00 - 0.20 10*3/mm3    Immature Grans, Absolute 0.02 0.00 - 0.05 10*3/mm3    nRBC 0.0 0.0 - 0.2 /100 WBC   High Sensitivity Troponin T    Collection Time: 06/04/23 10:32 PM    Specimen: Blood   Result Value Ref Range    HS Troponin T 39 (H) <15 ng/L   BNP    Collection Time: 06/04/23 10:32 PM    Specimen: Blood   Result Value Ref Range    proBNP 1,000.0 (H) 0.0 - 900.0 pg/mL   Procalcitonin    Collection Time: 06/04/23 10:32 PM    Specimen: Blood   Result Value Ref Range    Procalcitonin 0.06 0.00 - 0.25 ng/mL   Respiratory Panel PCR w/COVID-19(SARS-CoV-2) [facility]/M[facility]/JOHNNIE In-House, NP Swab in [facility], 3-4 HR TAT - Swab, Nasopharynx    Collection Time: 06/04/23 10:33 PM    Specimen: Nasopharynx; Swab   Result Value Ref Range    ADENOVIRUS, PCR Not Detected Not Detected    Coronavirus 229E Not Detected Not Detected    Coronavirus HKU1 Not Detected Not Detected    Coronavirus NL63 Not Detected Not Detected    Coronavirus OC43 Not Detected Not Detected    COVID19 Not Detected Not Detected - Ref. Range    Human Metapneumovirus Not Detected Not Detected    Human Rhinovirus/Enterovirus Not Detected Not Detected    Influenza A PCR Not Detected Not Detected     Influenza B PCR Not Detected Not Detected    Parainfluenza Virus 1 Not Detected Not Detected    Parainfluenza Virus 2 Not Detected Not Detected    Parainfluenza Virus 3 Not Detected Not Detected    Parainfluenza Virus 4 Not Detected Not Detected    RSV, PCR Not Detected Not Detected    Bordetella pertussis pcr Not Detected Not Detected    Bordetella parapertussis PCR Not Detected Not Detected    Chlamydophila pneumoniae PCR Not Detected Not Detected    Mycoplasma pneumo by PCR Not Detected Not Detected   POC Glucose Once    Collection Time: 06/04/23 11:55 PM    Specimen: Blood   Result Value Ref Range    Glucose 303 (H) 70 - 130 mg/dL   Lactic Acid, Plasma    Collection Time: 06/05/23 12:23 AM    Specimen: Blood   Result Value Ref Range    Lactate 1.4 0.5 - 2.0 mmol/L   High Sensitivity Troponin T 2Hr    Collection Time: 06/05/23 12:23 AM    Specimen: Blood   Result Value Ref Range    HS Troponin T 43 (H) <15 ng/L    Troponin T Delta 4 (C) >=-4 - <+4 ng/L       Ordered the above labs and reviewed the results.        RADIOLOGY  XR Chest 1 View    Result Date: 6/4/2023  XR CHEST 1 VW-  06/04/2023  HISTORY: Cough.  Heart size is within normal limits. There is mild to moderate patchy area of atelectasis or developing pneumonia in the left lung base similar which has a linear component. There are some minimal atelectasis or developing infiltrate in the right lung base. Sternotomy wires are seen.      1. Bibasilar atelectasis and/or developing pneumonia left greater than right.  This report was finalized on 6/4/2023 10:39 PM by Dr. Tad Godfrey M.D.       Ordered the above noted radiological studies. Reviewed by me in PACS.            PROCEDURES  Critical Care  Performed by: Ivett Collazo PA  Authorized by: Nir Jaime MD     Critical care provider statement:     Critical care time (minutes):  32    Critical care time was exclusive of:  Separately billable procedures and treating other patients    Critical  care was necessary to treat or prevent imminent or life-threatening deterioration of the following conditions:  Cardiac failure    Critical care was time spent personally by me on the following activities:  Blood draw for specimens, development of treatment plan with patient or surrogate, discussions with consultants, discussions with primary provider, evaluation of patient's response to treatment, examination of patient, review of old charts, re-evaluation of patient's condition, pulse oximetry, ordering and review of radiographic studies, ordering and review of laboratory studies, ordering and performing treatments and interventions and obtaining history from patient or surrogate              MEDICATIONS GIVEN IN ER  Medications   niCARdipine (CARDENE) 25 mg in 250 mL NS infusion kit (5 mg/hr Intravenous New Bag 6/5/23 0025)   sodium chloride 0.9 % flush 10 mL (has no administration in time range)   sennosides-docusate (PERICOLACE) 8.6-50 MG per tablet 2 tablet (has no administration in time range)     And   polyethylene glycol (MIRALAX) packet 17 g (has no administration in time range)     And   bisacodyl (DULCOLAX) EC tablet 5 mg (has no administration in time range)     And   bisacodyl (DULCOLAX) suppository 10 mg (has no administration in time range)   nitroglycerin (NITROSTAT) SL tablet 0.4 mg (has no administration in time range)   acetaminophen (TYLENOL) tablet 650 mg (has no administration in time range)   melatonin tablet 5 mg (has no administration in time range)   ondansetron (ZOFRAN) tablet 4 mg (has no administration in time range)     Or   ondansetron (ZOFRAN) injection 4 mg (has no administration in time range)   aluminum-magnesium hydroxide-simethicone (MAALOX MAX) 400-400-40 MG/5ML suspension 15 mL (has no administration in time range)   cloNIDine (CATAPRES) tablet 0.1 mg (0.1 mg Oral Given 6/4/23 2236)                   MEDICAL DECISION MAKING, PROGRESS, and CONSULTS    All labs have been  independently reviewed by me.  All radiology studies have been reviewed by me and I have also reviewed the radiology report.   EKG's independently viewed and interpreted by me.  Discussion below represents my analysis of pertinent findings related to patient's condition, differential diagnosis, treatment plan and final disposition.      Additional sources:    - Chronic or social conditions impacting care: Chronic respiratory failure, medication noncompliance          Orders placed during this visit:  Orders Placed This Encounter   Procedures    Respiratory Panel PCR w/COVID-19(SARS-CoV-2) [facility] In-House, NP Swab in UTM/VTM, 3-4 HR TAT - Swab, Nasopharynx    XR Chest 1 View    Comprehensive Metabolic Panel    CBC Auto Differential    High Sensitivity Troponin T    Lactic Acid, Plasma    BNP    Procalcitonin    High Sensitivity Troponin T 2Hr    Basic Metabolic Panel    CBC (No Diff)    Diet: Cardiac Diets; Healthy Heart (2-3 Na+); Texture: Regular Texture (IDDSI 7); Fluid Consistency: Thin (IDDSI 0)    Monitor Blood Pressure    Pulse Oximetry, Continuous    Vital Signs    Intake & Output    Oral Care    Place Sequential Compression Device    Maintain Sequential Compression Device    Telemetry - Maintain IV Access    Telemetry - Place Orders & Notify Provider of Results When Patient Experiences Acute Chest Pain, Dysrhythmia or Respiratory Distress    May Be Off Telemetry for Tests    Up With Assistance    Code Status and Medical Interventions:    LHA (on-call MD unless specified) Details    Incentive Spirometry    POC Glucose Once    ECG 12 Lead Other; htn    Initiate Observation Status    CBC & Differential         Differential diagnosis:  Medication noncompliance, hyper tensive crisis, acute CHF      Independent interpretation of labs, radiology studies, and discussions with consultants:  ED Course as of 06/05/23 0245   Brittni Jun 04, 2023   2144 180/110 at home   [KA]   1424 Did give patient a  dose of clonidine orally, he received it once an hour ago but his blood pressure has continued to escalate during his ER visit, doubtful that it will control it adequately, have ordered Cardene as well as additional labs to evaluate abnormal chest x-ray, anticipate admission for further evaluation and treatment [KA]   2317 . [KA]   2320 Glucose(!): 330 [KA]   2320 Creatinine: 1.05 [KA]   2354 HS Troponin T(!): 39 [KA]   2354 proBNP(!): 1,000.0 [KA]   Mon Jun 05, 2023   0045 I discussed the patient with LEIDY Steel for LHA.  We discussed patient's history presentation labs and imaging and ER course and she agrees to admit on behalf of Dr. De La Rosa. [KA]      ED Course User Index  [KA] Ivett Collazo PA     Chest x-ray suggestive of infiltrate versus atelectasis.  Patient has a normal white blood cell count, is afebrile, negative procalcitonin and lactic acid, findings not suggestive of bacterial infection of the lungs, increased markings may be related to volume increase due to noncompliance with Bumex.  No antibiotics initiated.  I reassessed the patient multiple times, he is resting comfortably, no change in condition, blood pressure improved with Cardene.        Patient was wearing a face mask when I entered the room and they continued to wear a mask throughout their stay in the ED.  I wore PPE, including  gloves, face mask with shield or face mask with goggles whenever I was in the room with patient.     DIAGNOSIS  Final diagnoses:   Severe uncontrolled hypertension   Abnormal chest x-ray   Uncontrolled type 2 diabetes mellitus with hyperglycemia   Elevated troponin         Latest Documented Vital Signs:  As of 02:45 EDT  BP- 132/70 HR- 60 Temp- 98.6 °F (37 °C) (Oral) O2 sat- 90%              --    Please note that portions of this were completed with a voice recognition program.       Note Disclaimer: At [facility], we believe that sharing information builds trust and better relationships. You are  receiving this note because you are receiving care at [facility] or recently visited. It is possible you will see health information before a provider has talked with you about it. This kind of information can be easy to misunderstand. To help you fully understand what it means for your health, we urge you to discuss this note with your provider.             Ivett Collazo PA  06/05/23 0246

## 2023-06-05 NOTE — ED NOTES
RN was told by LEIDY Camargo to hold Baclofen at this time. Lidocaine patches were placed on pts lower back and upper back for pain relief.     LEIDY camargo stopped CARDENE drip due to pts systolic in the 90's.

## 2023-06-05 NOTE — ED NOTES
Patient presents for hypertension. Has hx of htn but only takes his medicine when he feels like it. Has headache, and systolic pressures 200+. Wears O2 chronically at night. Arrives 4L NC

## 2023-06-05 NOTE — ED PROVIDER NOTES
"The GUZMAN and I have discussed this patient's history, physical exam, and treatment plan.  I have reviewed the documentation and personally had a face to face interaction with the patient. I affirm the documentation and agree with the treatment and plan.  The attached note describes my personal findings.      I provided a substantive portion of the care of the patient.  I personally performed the physical exam in its entirety, and below are my findings.     Brief history of present illness: 73-year-old male with history of coronary disease and chronic hypoxemic respiratory failure presents with complaint of uncontrolled hypertension and lower extremity edema as well as generalized weakness.  He denies any ongoing chest pain or new shortness of breath to me.    Physical exam:    BP (!) 220/120   Pulse 83   Resp 16   Ht 175.3 cm (69\")   Wt 89.8 kg (198 lb)   SpO2 94%   BMI 29.24 kg/m²       Physical Exam   Constitutional: No distress.  Nontoxic but chronically ill-appearing.  HENT:  Head: Normocephalic and atraumatic.   Oropharynx: Mucous membranes are moist.   Eyes: . No scleral icterus. No conjunctival pallor.  Neck: Normal range of motion. Neck supple.   Cardiovascular: Pink warm and well perfused throughout.    Pulmonary/Chest: No tachypnea or increased work of breathing.  Abdominal: Soft. There is no tenderness.   Musculoskeletal: Moves all extremities equally.  3+ pitting edema bilateral feet and ankles  Neurological: Alert and oriented.  Fairly weak when trying to stand  Skin: Skin is pink, warm, and dry.   Psychiatric: Mood and affect normal.   Nursing note and vitals reviewed.        MDM:    RADIOLOGY      Study: Single view portable chest  Findings: Bibasilar infiltrates versus atelectasis left greater than right  I independently viewed and interpreted these images contemporaneously with treatment.     I have personally reviewed and interpreted the EKG obtained today in the emergency department at 2212 " concomitant with treatment.  EKG reveals rhythm/rate -sinus, 85. ID-TALIA within normal limits.  QRS-IVCD ST/T-wave -ST elevation in aVR, V1 and V2 with ST depression inferolaterally; QTc within normal limits.  Comparison 4/19/2022-very similar appearing without clear evidence of acute change today    Agree with planned laboratory evaluation and treatment of uncontrolled hypertension.  Patient likely to require hospitalization for further evaluation and treatment.    Patient likely needs IV medication drip to control blood pressure without dropping it too far.  Patient will definitely need admitted.       Nir Jaime MD  06/04/23 [id number]

## 2023-06-05 NOTE — ED NOTES
Rn placed a call out to MD Woods for insulin orders. Pt's meal tray at nursing station. Waiting for call back at this time.

## 2023-06-05 NOTE — CONSULTS
Patient Name: Dilip Verma  :1950  73 y.o.    Date of Admission: 2023  Encounter Provider: Michaela Hylton MD  Date of Encounter Visit: 23  Place of Service: [facility] CARDIOLOG[facility]  Referring Provider: Lopez Woods MD  Patient Care Team:  System, Provider Not In as PCP - General  Morris Cai MD as Consulting Physician (Sleep Medicine)  Michaela Hylotn MD as Consulting Physician (Cardiology)  Hardy Banerjee MD as Consulting Physician (Ophthalmology)  Chula Christianson MD as Consulting Physician (Ophthalmology)  Jason Sherman MD (Endocrinology)  Perla Mayen MD as Consulting Physician (Nephrology)  Federico Hebert MD as Consulting Physician (Pulmonary Disease)  Niraj Ravi MD as Consulting Physician (Orthopedic Surgery)  Papa Velasco MD as Consulting Physician (Urology)  Terese Yost MD as Consulting Physician (Gastroenterology)  Aracelis Ball MD as Consulting Physician (Colon and Rectal Surgery)      Chief complaint: high blood pressure      History of Present Illness:      This is a patient of Dr. Reyes.  He has coronary artery disease, hypertension chronic respiratory failure on 4 L nasal cannula, pulmonary hypertension, obstructive sleep apnea, diabetes, neuropathy.  He was seen in 2019 with syncope.  Echo at that time showed an ejection fraction of 53%, moderate left ventricular hypertrophy, mild to moderate left atrial enlargement and no significant valve disease.  He had a Holter which showed paroxysmal atrial tachycardia.  He had an elevated troponin and underwent a heart catheterization which showed multivessel coronary disease.  He had a CABG with a LIMA to the LAD, vein graft to the first obtuse marginal branch, vein graft to the distal right coronary artery.  He had postoperative A-fib and was discharged on amiodarone.    In 2019 he had a non-ST elevation myocardial infarction and  stress test suggested inferior and lateral wall ischemia.  Echo showed normal LV function with ejection fraction of 62%, moderate left ventricular hypertrophy, grade 2 diastolic dysfunction and severe pulmonary hypertension.  Heart catheterization November 2019 showed widely patent grafts.  His PA pressure was 80/30 with a wedge of 20.  He received IV diuretics.    December 2020 he fell and had multiple femoral fractures and had 2 separate surgeries.  He was admitted by [facility] in April 2021 due to volume overload.  He had multiple complaints had a visit with [facility] in September 2022 and had a nuclear stress test that showed no evidence of ischemia.  Echo showed a normal EF.    He came to the emergency room in June 2023 with high blood pressure and upper respiratory symptoms for 3 to 4 days.  He generally takes a Bumex when his blood pressure is high and he reports taking 11 doses in the last 14 days.  He has chronic lower extremity edema and increased dyspnea.  Blood pressure in the emergency room was 194/96, BUN 25, troponin 39, proBNP is thousand.  Repeat troponin was 43 with a delta of 4.  Chest x-ray showed atelectasis and/or developing pneumonia left greater than right.    He says he feels like he has been getting sick with the flu.  He has been retaining fluid.  He has lower extremity edema and weight gain.  It was not responding to oral Bumex at home.    Stress test 10/5/22    Myocardial perfusion imaging indicates a normal myocardial perfusion study with no evidence of ischemia.  Left ventricular ejection fraction is normal. (Calculated EF = 56%).  Impressions are consistent with a low risk study.      ECHO 10/5/22  Calculated left ventricular EF = 66.1% Estimated left ventricular EF was in agreement with the calculated left ventricular EF. Left ventricular systolic function is normal.Left ventricular wall thickness is consistent with mild concentric hypertrophy.Abnormal global longitudinal LV strain (GLS) =  -12.3%.  Left ventricular diastolic function is consistent with (grade II w/high LAP) pseudonormalization.Elevated left atrial pressure.  The aortic valve exhibits sclerosis. There is restricted mobility of the right coronary cusp. There is no significant stenosis or regurgitation noted.         CATH 11/6/19    SUMMARY: Widely patent bypass grafts. Multivessel native coronary disease. Elevated biventricular pressures     RECOMMENDATIONS: Diuresis, risk factor modification.          Past Medical History:   Diagnosis Date    AMENA (acute kidney injury) 12/03/2020    CORI positive     Anemia     Anxiety     Ataxia     Atypical chest pain 04/19/2022    SEEN AT [facility] ER    Balance disorder     Cataract     BILATERAL, S/P EXTRACTION    Cervical radiculopathy 11/11/2019    SEEN AT  [facility] ER    Cervical spinal cord compression     Chronic diastolic (congestive) heart failure     Chronic kidney disease     STAGE 3, FOLLOWED BY DR. VIVAR    Chronic pancreatitis     Closed left subtrochanteric femur fracture 12/01/2020    ADMITTED TO [facility]    Closed nondisplaced intertrochanteric fracture of left femur 12/01/2020    ADMITTED TO [facility]    Colon polyps     FOLLOWED BY DR. AVTAR JEONG    Constipation     Contracture, right hand     Coronary artery disease     CABG 7/2019    COVID-19 07/2022    DDD (degenerative disc disease), cervical     Diabetes mellitus, type II     IDDM    Diabetic retinopathy     Dysphagia     Elevated brain natriuretic peptide (BNP) level 08/2014    Elevated LFTs 03/2021    Erectile dysfunction     Foot drop     Fuchs' corneal dystrophy of right eye     Glaucoma     BILATERAL    History of alcohol abuse     Hyperlipidemia     Hypersomnia     Hypertension     Hypertensive urgency 02/25/2020    ADMITTED TO [facility]    Insomnia     Kidney stones     LV dysfunction 06/2016    Lyme disease     Lymphadenopathy syndrome 05/2021    Macular edema     BILATERAL    Myocardial infarction 11/05/2019    NSTEMI, ADMITTED TO [facility]     "Myocardial infarction 07/12/2019    NSTEMI, ADMITTED TO [facility]    Non-celiac gluten sensitivity     Orthostasis     Osteoporosis     Oxygen dependent     PAD (peripheral artery disease)     PNA (pneumonia) 06/2016    LEFT LOBE    Polyneuropathy     PTSD (post-traumatic stress disorder)     Pulmonary hypertension     Pulmonary nodule     Senile ectropion of both lower eyelids 02/2022    Sepsis 12/16/2020    D/T UTI, ADMITTED TO [facility]    Sleep apnea     STATES DOESN'T USE BIPAP OR CPAP    Spinal stenosis in cervical region     SEVERE-LIMITED ROM    Stroke 2012    \"slight stroke\"    Syncope and collapse 07/06/2019    ADMITTED TO [facility]    Urinary retention 04/05/2021    SEEN AT [facility] ER    Vitamin D deficiency        Past Surgical History:   Procedure Laterality Date    CARDIAC CATHETERIZATION N/A 07/15/2019    Procedure: Coronary angiography;  Surgeon: Carrie Price MD;  Location:  [facility] CATH INVASIVE LOCATION;  Service: Cardiovascular    CARDIAC CATHETERIZATION N/A 07/15/2019    Procedure: Left Heart Cath;  Surgeon: Carrie Price MD;  Location: [facility] C[facility] LOC[facility];  Service: Cardiovascular    CARDIAC CATHETERIZATION N/A 07/15/2019    Procedure: Left ventriculography;  Surgeon: Carrie Price MD;  Location:  [facility] CATH INVASIVE LOCATION;  Service: Cardiovascular    CARDIAC CATHETERIZATION  07/15/2019    Procedure: Functional Flow [place];  Surgeon: Carrie Price MD;  Location: [facility];  Service: Cardiovascular    CARDIAC CATHETERIZATION N/A 11/06/2019    Procedure: Right and Left Heart Cath;  Surgeon: Mya Smith MD;  Location: [facility];  Service: Cardiovascular    CARDIAC CATHETERIZATION N/A 11/06/2019    Procedure: Coronary angiography;  Surgeon: Mya Smiht MD;  Location: [facility];  Service: Cardiovascular    CARDIAC SURGERY      CATARACT EXTRACTION Left 2014    CATARACT EXTRACTION Right 11/2016    PHACO/IOL, DR. LEN DENTON    " COLONOSCOPY N/A 03/16/2023    ENTIRE COLON WNL, RESCOPE IN 5 YRS, DR. LINDA LIZARRAGA AT [facility]    COLONOSCOPY W/ POLYPECTOMY N/A 01/02/2015    A FEW DIVERTICULA IN SIGMOID, 6 MM TUBULOVILLOUS ADENOMA POLYP IN RECTUM, SMALL HEMORRHOIDS, MELANOSIS COLI, DR. AVTAR JEONG AT [place] [facility]    CORONARY ARTERY BYPASS GRAFT N/A 07/18/2019    Procedure: INTRAOPERATIVE SHOAIB; STERNOTOMY CORONARY ARTERY BYPASS x 3  USING LEFT INTERNAL MAMMARY ARTERY GRAFT UTILIZING ENDOSCOPICALLY HARVESTED RIGHT GREATER SAPHENOUS VEIN AND PRP.;  Surgeon: Bill Devi MD;  Location:  [facility] MAIN OR;  Service: Cardiothoracic    CYSTOSCOPY BLADDER STONE LITHOTRIPSY N/A     DENTAL PROCEDURE Bilateral     3 surgeries inder implants    FEMUR IM NAILING/RODDING Left 12/03/2020    Procedure: LEFT HIP INTRAMEDULLARY NAIL;  Surgeon: Niraj Ravi MD;  Location:  [facility]IN OR;  Service: Orthopedic Spine;  Laterality: Left;    INGUINAL HERNIA REPAIR Bilateral     TOENAIL EXCISION  06/2022    TONSILLECTOMY Bilateral     TOTAL HIP ARTHROPLASTY REVISION Left 01/11/2022    Procedure: TOTAL HIP ARTHROPLASTY REVISION- POSTERIOR;  Surgeon: Jurgen Candelaria II, MD;  Location: [facility] MAIN OR;  Service: Orthopedics;  Laterality: Left;    VASECTOMY N/A          Prior to Admission medications    Medication Sig Start Date End Date Taking? Authorizing Provider   aspirin 81 MG EC tablet Take 1 tablet by mouth Daily. HOLD PRIOR TO SURGERY PER MD INSTRUCTIONS   Yes ProviderHeri MD   baclofen (LIORESAL) 10 MG tablet Take 1 tablet by mouth 4 (Four) Times a Day As Needed. 3/6/23  Yes Heri Rinaldi MD   bumetanide (BUMEX) 1 MG tablet Take 1 tablet by mouth 2 (Two) Times a Day As Needed (for swelling or high blood pressure). 9/19/22  Yes Danii Dennison APRN   Cholecalciferol (Vitamin D) 125 MCG (5000 UT) capsule capsule Take 1 capsule by mouth Daily.   Yes Heri Rinaldi MD   Docusate Sodium 100 MG capsule Daily.   Yes Provider,  MD Heri   Insulin Glargine, 2 Unit Dial, (TOUJEO) 300 UNIT/ML solution pen-injector injection Inject 34 Units under the skin into the appropriate area as directed Daily.   Yes Heri Rinaldi MD   insulin lispro (humaLOG) 100 UNIT/ML injection Inject 0-14 Units under the skin into the appropriate area as directed 3 (Three) Times a Day Before Meals.  Patient taking differently: Inject 0-14 Units under the skin into the appropriate area as directed 3 (Three) Times a Day Before Meals. SLIDING SCALE 12/7/20  Yes Amador Valverde MD   latanoprost (XALATAN) 0.005 % ophthalmic solution Administer 1 drop to both eyes every night at bedtime. 1/16/23  Yes Heri Rinaldi MD   MAGNESIUM PO Take  by mouth.   Yes Heri Rinaldi MD   Pseudoeph-Doxylamine-DM-APAP (NYQUIL PO) Take  by mouth.   Yes Heri Rinaldi MD   sildenafil (REVATIO) 20 MG tablet TAKE 1-3 TABLETS 1 HR PRIOR TO SEXUAL ACTIVITY 1/3/23  Yes Dakota Avila MD   testosterone (ANDROGEL) 25 MG/2.5GM (1%) gel gel Place  on the skin as directed by provider Daily.   Yes Heri Rinaldi MD   traMADol (ULTRAM) 50 MG tablet Take 1 tablet by mouth Every 8 (Eight) Hours As Needed for Severe Pain. 4/22/23  Yes Dakota Avila MD   Unable to find 1 each 1 (One) Time. Cbd pain stick   Yes Heri Rinaldi MD   Unable to find 1 each 1 (One) Time. Hemp gummies   Yes Heri Rinaldi MD   Unable to find 1 each 1 (One) Time. Relaxium, pt takes one per night   OTC   Yes Heri Rinaldi MD   mometasone (ELOCON) 0.1 % ointment Apply 1 application topically to the appropriate area as directed Daily. Do not exceed 1 week, needs to be off 1 week and can restart 2/22/23   Jaquan Otoole MD       Allergies   Allergen Reactions    Ace Inhibitors Angioedema    Losartan Angioedema     Angioneurotic edema    Seroquel [Quetiapine] Hallucinations    Xanax [Alprazolam] Unknown - High Severity    Amlodipine Swelling    Ativan [Lorazepam]  Hallucinations    Minoxidil Confusion    Tetracycline Rash     bliisters in mouth         Social History     Socioeconomic History    Marital status:    Tobacco Use    Smoking status: Former     Packs/day: 0.75     Years: 16.00     Pack years: 12.00     Types: Cigarettes     Quit date:      Years since quittin.     Passive exposure: Past    Smokeless tobacco: Never    Tobacco comments:     Start age:39/Stopping age:47, 3/4 PPD   Vaping Use    Vaping Use: Never used   Substance and Sexual Activity    Alcohol use: Not Currently     Comment: alcohol abuse, none x 25 years / caffeine use - 1 cup daily    Drug use: No    Sexual activity: Defer       Family History   Problem Relation Age of Onset    Heart disease Mother     Hypertension Mother     Hyperlipidemia Mother     Depression Mother     Cancer Mother         uterine    Arrhythmia Mother     Uterine cancer Mother     Hyperlipidemia Father     Heart disease Father     Hypertension Father     Kidney disease Father     Thyroid disease Father     Heart failure Father     Depression Sister     Alcohol abuse Brother     Thyroid disease Paternal Uncle     Lung disease Paternal Uncle     Stroke Maternal Grandmother     Glaucoma Maternal Grandmother     Heart attack Paternal Grandmother     Stroke Paternal Grandmother     Alcohol abuse Paternal Grandfather     Malig Hyperthermia Neg Hx        REVIEW OF SYSTEMS:   All other systems reviewed and negative.        Objective:     Vitals:    23 1101 23 1116 23 1131 23 1527   BP: 144/77 144/75 161/85 173/86   BP Location:    Left arm   Patient Position:    Lying   Pulse: 75 72 73    Resp:       Temp:       TempSrc:       SpO2:   96% 94%   Weight:       Height:         Body mass index is 29.24 kg/m².    Intake/Output Summary (Last 24 hours) at 2023 1535  Last data filed at 2023 1525  Gross per 24 hour   Intake --   Output 500 ml   Net -500 ml     Constitutional: He is oriented to  person, place, and time. He appears well-developed. He does not appear ill.   HENT:   Head: Normocephalic and atraumatic. Head is without contusion.   Right Ear: Hearing normal. No drainage.   Left Ear: Hearing normal. No drainage.   Nose: No nasal deformity. No epistaxis.   Eyes: Lids are normal. Right eye exhibits no exudate. Left eye exhibits no exudate.  Neck: No JVD present. Carotid bruit is not present. No tracheal deviation present. No thyroid mass and no thyromegaly present.   Cardiovascular: Normal rate, regular rhythm and normal heart sounds.  2+ bilateral lower extremity edema.  Pulmonary/Chest: Effort normal and breath sounds normal.   Abdominal: Soft. Normal appearance and bowel sounds are normal. There is no tenderness.   Musculoskeletal: Normal range of motion.        Right shoulder: He exhibits no deformity.        Left shoulder: He exhibits no deformity.   Neurological: He is alert and oriented to person, place, and time. He has normal strength.   Skin: Skin is warm, dry and intact. No rash noted.   Psychiatric: He has a normal mood and affect. His behavior is normal. Thought content normal.   Vitals reviewed      Lab Review:     Results from last 7 days   Lab Units 06/05/23  0719 06/04/23  2232   SODIUM mmol/L 139 141   POTASSIUM mmol/L 4.3 4.2   CHLORIDE mmol/L 102 105   CO2 mmol/L 27.4 28.5   BUN mg/dL 29* 25*   CREATININE mg/dL 1.31* 1.05   CALCIUM mg/dL 9.5 8.7   BILIRUBIN mg/dL  --  0.5   ALK PHOS U/L  --  71   ALT (SGPT) U/L  --  17   AST (SGOT) U/L  --  14   GLUCOSE mg/dL 366* 330*     Results from last 7 days   Lab Units 06/05/23  0023 06/04/23  2232   HSTROP T ng/L 43* 39*     Results from last 7 days   Lab Units 06/05/23  0719   WBC 10*3/mm3 10.68   HEMOGLOBIN g/dL 16.0   HEMATOCRIT % 46.7   PLATELETS 10*3/mm3 230                                     I personally viewed and interpreted the patient's EKG/Telemetry data.        Assessment and Plan:       1.  Coronary artery disease.  Status  post bypass on July 18 of 2019 secondary to multivessel coronary artery disease.  Normal stress test in October 2022.  No anginal symptoms.  2.  Postoperative atrial fibrillation.  CHADS2-Vasc score of 4.  This was a brief postoperative episode.  Not on anticoagulation or antiarrhythmic.  3.  Hypertension.  This is generally managed by his nephrologist.  4.  Hyperlipidemia.  On atorvastatin  5.  Carotid artery disease with bilateral mild stenosis.  6.  Diabetes.  7.  Abnormal EKG.  Unchanged from prior.  8.  Type II myocardial infarction  9.  Acute on chronic diastolic dysfunction.  He had a normal ejection fraction in October 2022 with grade 2 diastolic dysfunction.  proBNP is very mildly elevated.  I will defer diuretics to his nephrologist.    Michaela Hylton MD  06/05/23  15:35 EDT

## 2023-06-05 NOTE — PROGRESS NOTES
"[facility] Clinical [facility]acy Services: Enoxaparin Consult    Dilip Verma has a pharmacy consult to dose prophylactic enoxaparin per Dr. Woods's request.     Indication: VTE Prophylaxis  Home Anticoagulation: None     Relevant clinical data and objective history reviewed:  73 y.o. male 175.3 cm (69\") 89.8 kg (198 lb)   Body mass index is 29.24 kg/m².   Results from last 7 days   Lab Units 06/05/23  0719   PLATELETS 10*3/mm3 230     Estimated Creatinine Clearance: 54.8 mL/min (A) (by C-G formula based on SCr of 1.31 mg/dL (H)).    Assessment/Plan    Will start patient on 40mg subcutaneous every 24 hours, adjusted for renal function. Consult order will be discontinued but pharmacy will continue to follow.     Mena Treviño, PharmD  Clinical Pharmacist    "

## 2023-06-06 PROBLEM — I21.A1 TYPE 2 ACUTE MYOCARDIAL INFARCTION: Status: ACTIVE | Noted: 2019-07-12

## 2023-06-06 LAB
ANION GAP SERPL CALCULATED.3IONS-SCNC: 7 MMOL/L (ref 5–15)
BASOPHILS # BLD AUTO: 0.03 10*3/MM3 (ref 0–0.2)
BASOPHILS NFR BLD AUTO: 0.3 % (ref 0–1.5)
BUN SERPL-MCNC: 26 MG/DL (ref 8–23)
BUN/CREAT SERPL: 20.6 (ref 7–25)
CALCIUM SPEC-SCNC: 9.3 MG/DL (ref 8.6–10.5)
CHLORIDE SERPL-SCNC: 102 MMOL/L (ref 98–107)
CO2 SERPL-SCNC: 30 MMOL/L (ref 22–29)
CREAT SERPL-MCNC: 1.26 MG/DL (ref 0.76–1.27)
DEPRECATED RDW RBC AUTO: 44.2 FL (ref 37–54)
EGFRCR SERPLBLD CKD-EPI 2021: 59.8 ML/MIN/1.73
EOSINOPHIL # BLD AUTO: 0.25 10*3/MM3 (ref 0–0.4)
EOSINOPHIL NFR BLD AUTO: 2.3 % (ref 0.3–6.2)
ERYTHROCYTE [DISTWIDTH] IN BLOOD BY AUTOMATED COUNT: 13.2 % (ref 12.3–15.4)
GLUCOSE BLDC GLUCOMTR-MCNC: 118 MG/DL (ref 70–130)
GLUCOSE BLDC GLUCOMTR-MCNC: 155 MG/DL (ref 70–130)
GLUCOSE BLDC GLUCOMTR-MCNC: 211 MG/DL (ref 70–130)
GLUCOSE BLDC GLUCOMTR-MCNC: 99 MG/DL (ref 70–130)
GLUCOSE SERPL-MCNC: 126 MG/DL (ref 65–99)
HCT VFR BLD AUTO: 44.9 % (ref 37.5–51)
HGB BLD-MCNC: 15.3 G/DL (ref 13–17.7)
IMM GRANULOCYTES # BLD AUTO: 0.02 10*3/MM3 (ref 0–0.05)
IMM GRANULOCYTES NFR BLD AUTO: 0.2 % (ref 0–0.5)
LYMPHOCYTES # BLD AUTO: 1.87 10*3/MM3 (ref 0.7–3.1)
LYMPHOCYTES NFR BLD AUTO: 17.5 % (ref 19.6–45.3)
MCH RBC QN AUTO: 31.1 PG (ref 26.6–33)
MCHC RBC AUTO-ENTMCNC: 34.1 G/DL (ref 31.5–35.7)
MCV RBC AUTO: 91.3 FL (ref 79–97)
MONOCYTES # BLD AUTO: 0.78 10*3/MM3 (ref 0.1–0.9)
MONOCYTES NFR BLD AUTO: 7.3 % (ref 5–12)
NEUTROPHILS NFR BLD AUTO: 7.73 10*3/MM3 (ref 1.7–7)
NEUTROPHILS NFR BLD AUTO: 72.4 % (ref 42.7–76)
NRBC BLD AUTO-RTO: 0 /100 WBC (ref 0–0.2)
PLATELET # BLD AUTO: 231 10*3/MM3 (ref 140–450)
PMV BLD AUTO: 10.4 FL (ref 6–12)
POTASSIUM SERPL-SCNC: 4.2 MMOL/L (ref 3.5–5.2)
RBC # BLD AUTO: 4.92 10*6/MM3 (ref 4.14–5.8)
SODIUM SERPL-SCNC: 139 MMOL/L (ref 136–145)
WBC NRBC COR # BLD: 10.68 10*3/MM3 (ref 3.4–10.8)

## 2023-06-06 PROCEDURE — 83835 ASSAY OF METANEPHRINES: CPT | Performed by: STUDENT IN AN ORGANIZED HEALTH CARE EDUCATION/TRAINING PROGRAM

## 2023-06-06 PROCEDURE — 25010000002 FUROSEMIDE PER 20 MG: Performed by: STUDENT IN AN ORGANIZED HEALTH CARE EDUCATION/TRAINING PROGRAM

## 2023-06-06 PROCEDURE — 63710000001 INSULIN LISPRO (HUMAN) PER 5 UNITS: Performed by: NURSE PRACTITIONER

## 2023-06-06 PROCEDURE — 97162 PT EVAL MOD COMPLEX 30 MIN: CPT

## 2023-06-06 PROCEDURE — 99214 OFFICE O/P EST MOD 30 MIN: CPT | Performed by: INTERNAL MEDICINE

## 2023-06-06 PROCEDURE — 85025 COMPLETE CBC W/AUTO DIFF WBC: CPT | Performed by: INTERNAL MEDICINE

## 2023-06-06 PROCEDURE — 25010000002 ENOXAPARIN PER 10 MG: Performed by: INTERNAL MEDICINE

## 2023-06-06 PROCEDURE — 82948 REAGENT STRIP/BLOOD GLUCOSE: CPT

## 2023-06-06 PROCEDURE — 80048 BASIC METABOLIC PNL TOTAL CA: CPT | Performed by: INTERNAL MEDICINE

## 2023-06-06 RX ADMIN — OXYMETAZOLINE HYDROCHLORIDE 2 SPRAY: 0.05 SPRAY NASAL at 21:33

## 2023-06-06 RX ADMIN — INSULIN GLARGINE-YFGN 27 UNITS: 100 INJECTION, SOLUTION SUBCUTANEOUS at 08:16

## 2023-06-06 RX ADMIN — Medication 5 MG: at 21:33

## 2023-06-06 RX ADMIN — LIDOCAINE 3 PATCH: 50 PATCH CUTANEOUS at 08:15

## 2023-06-06 RX ADMIN — INSULIN LISPRO 2 UNITS: 100 INJECTION, SOLUTION INTRAVENOUS; SUBCUTANEOUS at 21:33

## 2023-06-06 RX ADMIN — TRAMADOL HYDROCHLORIDE 50 MG: 50 TABLET, COATED ORAL at 00:54

## 2023-06-06 RX ADMIN — ASPIRIN 81 MG: 81 TABLET, CHEWABLE ORAL at 08:15

## 2023-06-06 RX ADMIN — TRAMADOL HYDROCHLORIDE 50 MG: 50 TABLET, COATED ORAL at 21:33

## 2023-06-06 RX ADMIN — FUROSEMIDE 40 MG: 40 INJECTION, SOLUTION INTRAMUSCULAR; INTRAVENOUS at 21:33

## 2023-06-06 RX ADMIN — HYDRALAZINE HYDROCHLORIDE 25 MG: 25 TABLET, FILM COATED ORAL at 06:15

## 2023-06-06 RX ADMIN — DOCUSATE SODIUM 50MG AND SENNOSIDES 8.6MG 2 TABLET: 8.6; 5 TABLET, FILM COATED ORAL at 11:11

## 2023-06-06 RX ADMIN — FUROSEMIDE 40 MG: 40 INJECTION, SOLUTION INTRAMUSCULAR; INTRAVENOUS at 14:30

## 2023-06-06 RX ADMIN — ENOXAPARIN SODIUM 40 MG: 100 INJECTION SUBCUTANEOUS at 12:27

## 2023-06-06 RX ADMIN — INSULIN LISPRO 4 UNITS: 100 INJECTION, SOLUTION INTRAVENOUS; SUBCUTANEOUS at 12:27

## 2023-06-06 RX ADMIN — Medication 5000 UNITS: at 08:15

## 2023-06-06 RX ADMIN — DOCUSATE SODIUM 50MG AND SENNOSIDES 8.6MG 2 TABLET: 8.6; 5 TABLET, FILM COATED ORAL at 21:33

## 2023-06-06 RX ADMIN — OXYMETAZOLINE HYDROCHLORIDE 2 SPRAY: 0.05 SPRAY NASAL at 08:16

## 2023-06-06 RX ADMIN — ACETAMINOPHEN 650 MG: 325 TABLET, FILM COATED ORAL at 23:25

## 2023-06-06 RX ADMIN — HYDRALAZINE HYDROCHLORIDE 25 MG: 25 TABLET, FILM COATED ORAL at 14:30

## 2023-06-06 RX ADMIN — HYDRALAZINE HYDROCHLORIDE 25 MG: 25 TABLET, FILM COATED ORAL at 21:33

## 2023-06-06 RX ADMIN — FUROSEMIDE 40 MG: 40 INJECTION, SOLUTION INTRAMUSCULAR; INTRAVENOUS at 06:15

## 2023-06-06 NOTE — DISCHARGE PLACEMENT REQUEST
"Dilip Reilly \"Ck\" (73 y.o. Male)       Date of Birth   1950    Social Security Number       Address   [address] [place] [address]    Home Phone   [phone]    MRN   [id number]       Yazdanism   Yarsanism    Marital Status                               Admission Date   6/4/23    Admission Type   Emergency    Admitting Provider   Lasha De La Rosa MD    Attending Provider   Lopez Woods MD    Department, Room/Bed   [address], E448/1       Discharge Date       Discharge Disposition       Discharge Destination                                 Attending Provider: Lopez Woods MD    Allergies: Ace Inhibitors, Losartan, Seroquel [Quetiapine], Xanax [Alprazolam], Amlodipine, Ativan [Lorazepam], Baclofen, Minoxidil, Tetracycline    Isolation: None   Infection: None   Code Status: CPR    Ht: 175.3 cm (69\")   Wt: 91.8 kg (202 lb 6.4 oz)    Admission Cmt: None   Principal Problem: Severe uncontrolled hypertension [I10]                   Active Insurance as of 6/4/2023       Primary Coverage       Payor Plan Insurance Group Employer/Plan Group    [facility] MEDICARE REPLACEMENT [facility] MEDICARE REPLACEMENT [id number]       Payor Plan Address Payor Plan Phone Number Payor Plan Fax Number Effective Dates    [address] [address]   1/1/2015 - None Entered    [facility] [id number]         Subscriber Name Subscriber Birth Date Member ID       DILIP REILLY 1950 [id number]                     Emergency Contacts        (Rel.) Home Phone Work Phone Mobile Phone    Beny Reilly (Spouse) [phone] -- [phone]    TOMMIESILVIA (Son) [phone] -- [phone]                "

## 2023-06-06 NOTE — PROGRESS NOTES
Name: Dilip Verma ADMIT: 2023   : 1950  PCP: System, Provider Not In    MRN: [id number] LOS: 0 days   AGE/SEX: 73 y.o. male  ROOM: E4/     Subjective   Subjective      Edema in legs better but still present. Less SOB. Overall feels better. No URI symptoms, no CP fever or chills       Objective   Objective   Vital Signs  Temp:  [97.8 °F (36.6 °C)-98.4 °F (36.9 °C)] 98.2 °F (36.8 °C)  Heart Rate:  [65-81] 78  Resp:  [18-20] 20  BP: ()/(36-93) 122/65  SpO2:  [92 %-100 %] 93 %  on  Flow (L/min):  [2-3] 2;   Device (Oxygen Therapy): nasal cannula  Body mass index is 29.89 kg/m².  Physical Exam  Constitutional:       Appearance: He is obese. He is ill-appearing.   Cardiovascular:      Rate and Rhythm: Normal rate and regular rhythm.   Pulmonary:      Effort: No respiratory distress.      Comments: Decreased breath sounds     Abdominal:      General: Bowel sounds are normal. There is no distension.      Palpations: Abdomen is soft.      Tenderness: There is no abdominal tenderness.   Musculoskeletal:      Right lower leg: Edema present.      Left lower leg: Edema present.   Neurological:      General: No focal deficit present.      Mental Status: He is oriented to person, place, and time. Mental status is at baseline.         Results Review     I reviewed the patient's new clinical results.  Results from last 7 days   Lab Units 23  04223  2232   WBC 10*3/mm3 10.68 10.68 8.52   HEMOGLOBIN g/dL 15.3 16.0 16.4   PLATELETS 10*3/mm3 231 230 209     Results from last 7 days   Lab Units 23  04223  0719 23  2232   SODIUM mmol/L 139 139 141   POTASSIUM mmol/L 4.2 4.3 4.2   CHLORIDE mmol/L 102 102 105   CO2 mmol/L 30.0* 27.4 28.5   BUN mg/dL 26* 29* 25*   CREATININE mg/dL 1.26 1.31* 1.05   GLUCOSE mg/dL 126* 366* 330*   EGFR mL/min/1.73 59.8* 57.1* 74.5     Results from last 7 days   Lab Units 23  2232   ALBUMIN g/dL 3.8   BILIRUBIN mg/dL 0.5   ALK  PHOS U/L 71   AST (SGOT) U/L 14   ALT (SGPT) U/L 17     Results from last 7 days   Lab Units 06/06/23  0428 06/05/23  0719 06/04/23  2232   CALCIUM mg/dL 9.3 9.5 8.7   ALBUMIN g/dL  --   --  3.8     Results from last 7 days   Lab Units 06/05/23  0023 06/04/23  2232   PROCALCITONIN ng/mL  --  0.06   LACTATE mmol/L 1.4  --      Glucose   Date/Time Value Ref Range Status   06/06/2023 0759 118 70 - 130 mg/dL Final     Comment:     Meter: [id number] : [id number] Larry Barbour NA   06/05/2023 2102 112 70 - 130 mg/dL Final     Comment:     Meter: [id number] : [id number] Karel Clarkeu NA   06/05/2023 1712 298 (H) 70 - 130 mg/dL Final     Comment:     Meter: [id number] : [id number] Alex Serranoara NA   06/05/2023 1438 361 (H) 70 - 130 mg/dL Final     Comment:     Meter: [id number] : [id number] Alex Serranoara NA   06/05/2023 1239 365 (H) 70 - 130 mg/dL Final     Comment:     Meter: [id number] : [id number] Sandor Hernandez   06/05/2023 0956 329 (H) 70 - 130 mg/dL Final     Comment:     Meter: [id number] : [id number] Addison Cruz RN   06/04/2023 2355 303 (H) 70 - 130 mg/dL Final     Comment:     Meter: [id number] : [id number] Doug Lowe ERT[employer]       XR Chest 1 View    Result Date: 6/4/2023  1. Bibasilar atelectasis and/or developing pneumonia left greater than right.  This report was finalized on 6/4/2023 10:39 PM by Dr. Tad Godfrey M.D.     I have personally reviewed all medications:  Scheduled Medications  aspirin, 81 mg, Oral, Daily  baclofen, 10 mg, Oral, Q8H  cholecalciferol, 5,000 Units, Oral, Daily  enoxaparin, 40 mg, Subcutaneous, Q24H  furosemide, 40 mg, Intravenous, Q8H  hydrALAZINE, 25 mg, Oral, Q8H  insulin glargine, 27 Units, Subcutaneous, Daily  insulin lispro, 2-9 Units, Subcutaneous, 4x Daily AC & at Bedtime  latanoprost, 1 drop, Both Eyes, Nightly  lidocaine, 3 patch, Transdermal, Q24H  oxymetazoline, 2 spray, Each Nare, BID  senna-docusate sodium, 2 tablet, Oral,  BID    Infusions  niCARdipine, 5-15 mg/hr, Last Rate: Stopped (06/05/23 1040)    Diet  Diet: Cardiac Diets; Healthy Heart (2-3 Na+); Texture: Regular Texture (IDDSI 7); Fluid Consistency: Thin (IDDSI 0)    I have personally reviewed:  [x]  Laboratory   [x]  Microbiology   [x]  Radiology   [x]  EKG/Telemetry  [x]  Cardiology/Vascular   []  Pathology    []  Records       Assessment/Plan     Active Hospital Problems    Diagnosis  POA    **Severe uncontrolled hypertension [I10]  Yes    Type 2 diabetes mellitus with diabetic neuropathy, with long-term current use of insulin [E11.40, Z79.4]  Not Applicable    Acute on chronic diastolic (congestive) heart failure [I50.33]  Yes    HTN (hypertension) [I10]  Yes    Hx of CABG [Z95.1]  Not Applicable    CAD (coronary artery disease) [I25.10]  Yes    Type 2 acute myocardial infarction [I21.A1]  Yes    Type 2 diabetes mellitus with hyperglycemia, with long-term current use of insulin [E11.65, Z79.4]  Not Applicable    Hyperlipidemia [E78.5]  Yes      Resolved Hospital Problems   No resolved problems to display.       73 y.o. male admitted with Severe uncontrolled hypertension.      Acute on chronic diastolic CHF  CKD3  Volume overload  Acute hypoxemia  Diuresis per nephrology. Lasix IV 40mg BID  TTE with LVEF 66% and grade 2 diastolic dysfunction.   Wean 02 as tolerated. Now on 2L NC. Not on home oxygen     Uncontrolled HTN  Not on home antihypertensive prior to arrival. Started on nifedipine and hydralazine. Intoleratant to ACEi (angioedema) and amlodipine (edema).   Check Orthostatic BP concerns for autonomic dysfunction     URI symptoms, resolved   RVP normal, CXR with PNA vs atelectasias but afebrile, procal with normal WBC. Hold on abx. Monitor     Type 2 MI/ CAD/ hx post op afib  No CP or afib. CABG in 2019.     DM2 with neuropathy and retinopathy  Glucose elevated.  Check A1c.  Avoid hypoglycemia.  Resume home Toujeo and correctional insulin.     Chronic back  pain  Lidoderm patches     History of CVA/tremors  Patient is appoint with neurology on 6/14.  Likely not far from his cognitive baseline at this time. 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  On oxygen at night only.    Lovenox 40 mg SC daily for DVT prophylaxis.  Full code.  Discussed with patient, nursing staff, and Dr Woods .  Anticipate discharge 1-2days      LEIDY Taveras  [facility]  06/06/23  09:57 EDT

## 2023-06-06 NOTE — PROGRESS NOTES
"      FOLLOW UP NOTE      Name: Dilip Verma ADMIT: 2023   : 1950  PCP: System, Provider Not In    MRN: [id number] LOS: 0 days   AGE/SEX: 73 y.o. male  ROOM: [facility]/     Date of Service: 2023                           CHIEF COMPLIANTS / REASON FOR FOLLOW UP                Subjective:          Reports good urine output with diuretics, 1 L charted  Swelling is better, dyspnea is better, family feels mental status is improving           Review of Systems:         Constitutional: No fever, no chills, no lethargy, no weakness.  HEENT:  No headache, otalgia, itchy eyes, nasal discharge or sore throat.  Cardiac:  No chest pain, dyspnea, orthopnea or PND.  Chest:  No cough, phlegm or wheezing.  Abdomen:  No abdominal pain, nausea or vomiting.  Neuro:  No focal weakness, abnormal movements or seizure-like activity.  :   No hematuria, no pyuria, no dysuria, no flank pain.  Extremities:  No  joint pains.  ROS was otherwise negative except as mentioned in the [place].        OBJECTIVE                                                                        Exam:  /92 (BP Location: Left arm, Patient Position: Standing)   Pulse 80   Temp 98.2 °F (36.8 °C) (Oral)   Resp 20   Ht 175.3 cm (69\")   Wt 91.8 kg (202 lb 6.4 oz)   SpO2 (!) 88%   BMI 29.89 kg/m²   Intake/Output last 3 shifts:  I/O last 3 completed shifts:  In: 0   Out: 1250 [Urine:1250]  Intake/Output this shift:  I/O this shift:  In: 120 [P.O.:120]  Out: 200 [Urine:200]    General Appearance:  Alert, cooperative, no distress, appears stated age  Head:  Normocephalic, without obvious abnormality, atraumatic  Eyes:  Sclerae anicteric, EOM's intact     Neck: No JVD  Lungs:   Clear to auscultation bilaterally, respirations unlabored  Heart:  Regular rate and rhythm, S1 and S2 normal, no  rub   or gallop  Abdomen:  Soft, nontender,  Extremities:  Extremities normal, 1+ edema improving   Neurologic:   Alert and oriented, no focal " deficits    Scheduled Meds:aspirin, 81 mg, Oral, Daily  baclofen, 10 mg, Oral, Q8H  cholecalciferol, 5,000 Units, Oral, Daily  enoxaparin, 40 mg, Subcutaneous, Q24H  furosemide, 40 mg, Intravenous, Q8H  hydrALAZINE, 25 mg, Oral, Q8H  insulin glargine, 27 Units, Subcutaneous, Daily  insulin lispro, 2-9 Units, Subcutaneous, 4x Daily AC & at Bedtime  latanoprost, 1 drop, Both Eyes, Nightly  lidocaine, 3 patch, Transdermal, Q24H  oxymetazoline, 2 spray, Each Nare, BID  senna-docusate sodium, 2 tablet, Oral, BID      Continuous Infusions:niCARdipine, 5-15 mg/hr, Last Rate: Stopped (06/05/23 1040)      PRN Meds:  acetaminophen    aluminum-magnesium hydroxide-simethicone    senna-docusate sodium **AND** polyethylene glycol **AND** bisacodyl **AND** bisacodyl    dextrose    dextrose    glucagon (human recombinant)    melatonin    nitroglycerin    ondansetron **OR** ondansetron    sodium chloride    traMADol         Data Review:                                                                           Labs reviewed        Imaging:                                                                           Radiology reviewed           ASSESSMENT:                                                                                Severe uncontrolled hypertension    Type 2 diabetes mellitus with hyperglycemia, with long-term current use of insulin    Hyperlipidemia    Type 2 acute myocardial infarction    CAD (coronary artery disease)    Hx of CABG    HTN (hypertension)    Acute on chronic diastolic (congestive) heart failure    Type 2 diabetes mellitus with diabetic neuropathy, with long-term current use of insulin       Chronic kidney disease stage III  Fluid overload  Uncontrolled hypertension  Heart failure with preserved ejection fraction  Coronary artery disease  Glycosuria      PLAN:                                                                            Fluid overload in a patient with underlying chronic kidney disease  stage III, with significant weight gain of nearly 20 pounds and orthopnea with uncontrolled hypertension.  Volume overload improving with diuretics, renal function stable creatinine 1.2 mg/dL  Continue IV diuretics lasix 40 mg q8h today  Switch to PO lasix or bumex by tomorrow.   Mild metabolic alkalosis with diuretics: We will monitor  HTN better controlled with diuretics and starting hydralazine: continue   2 g sodium restricted diet  Daily standing weights  No orthostatic hypotension  Last TTE with LVEF 66% and grade 2 diastolic dysfunction.  No proteinuria, glycosuria probably due to hyperglycemia.  Angioedema with ACE inhibitors and swelling with amlodipine, avoid both of the above.  Agree with avoiding baclofen with reports of mental status change    We will follow.     Nya Saez MD  [facility]  6/6/2023  12:16 EDT

## 2023-06-06 NOTE — PLAN OF CARE
Goal Outcome Evaluation:   Vitals stable. 2-3L NC continued. Male purewick placed re: IV lasix. BP stable with addition of hydralazine. Wife at bedside. Nasal spray re: complaints of congestion. Baclofen held due to patient and wife stating it causes hallucinations. Education provided on importance of diuretics and signs/symptoms of fluid overload.

## 2023-06-06 NOTE — PLAN OF CARE
Goal Outcome Evaluation:  Plan of Care Reviewed With: patient    Pt is 74 y/o M admitted to [facility] on 6/4/23 with flu like sxs. At baseline pt is indep with mobility using 4ww. Lives with spouse who is able to assist as needed. Ramped entrance. Pt currently demos CGA for transfers and ambulation up to 15' using walker with no LOB. Mild unsteadiness noted, but good safety awareness noted. Pt demos mobility below baseline and therefore would benefit from acute skilled PT to address functional mobility deficits. Anticipate d/c home with spouse and home PT.

## 2023-06-06 NOTE — PROGRESS NOTES
LOS: 0 days   Patient Care Team:  System, Provider Not In as PCP - General  Morris Cai MD as Consulting Physician (Sleep Medicine)  Michaela Hylton MD as Consulting Physician (Cardiology)  Hardy Banerjee MD as Consulting Physician (Ophthalmology)  Chula Christianson MD as Consulting Physician (Ophthalmology)  Jason Sherman MD (Endocrinology)  Perla Mayen MD as Consulting Physician (Nephrology)  Federico Hebert MD as Consulting Physician (Pulmonary Disease)  Niraj Ravi MD as Consulting Physician (Orthopedic Surgery)  Papa Velasco MD as Consulting Physician (Urology)  Terese Yost MD as Consulting Physician (Gastroenterology)  Aracelis Ball MD as Consulting Physician (Colon and Rectal Surgery)    Chief Complaint: Follow-up acute on chronic diastolic CHF, coronary artery disease, type II NSTEMI.    Interval History: Doing better.  He diuresed well, although he does have significant fluid still on board.  No chest pain.  He still gets short of breath with exertion.    Vital Signs:  Temp:  [97.9 °F (36.6 °C)-98.4 °F (36.9 °C)] 98 °F (36.7 °C)  Heart Rate:  [75-83] 77  Resp:  [18-20] 18  BP: (122-179)/(64-93) 179/70    Intake/Output Summary (Last 24 hours) at 6/6/2023 1817  Last data filed at 6/6/2023 1733  Gross per 24 hour   Intake 220 ml   Output 1520 ml   Net -1300 ml       Physical Exam:   General Appearance:    No acute distress, alert and oriented x4   Lungs:     Decreased breath sounds at the bases bilaterally.    Heart:    Regular rhythm and normal rate.  No murmurs, gallops, or     rubs.   Abdomen:     Soft, nontender, nondistended.    Extremities:   2+ edema of the lower extremities (L>R)     Results Review:    Results from last 7 days   Lab Units 06/06/23  0428   SODIUM mmol/L 139   POTASSIUM mmol/L 4.2   CHLORIDE mmol/L 102   CO2 mmol/L 30.0*   BUN mg/dL 26*   CREATININE mg/dL 1.26   GLUCOSE mg/dL 126*   CALCIUM mg/dL 9.3     Results from last 7 days    Lab Units 06/05/23  0023 06/04/23  2232   HSTROP T ng/L 43* 39*     Results from last 7 days   Lab Units 06/06/23  0428   WBC 10*3/mm3 10.68   HEMOGLOBIN g/dL 15.3   HEMATOCRIT % 44.9   PLATELETS 10*3/mm3 231                       I reviewed the patient's new clinical results.        Assessment:  1.  Acute on chronic diastolic CHF (EF 66%)  2.  Hypertension, uncontrolled  3.  Coronary artery disease, status post CABG in 2019  4.  Type II NSTEMI  5.  Stage IIIa chronic kidney disease  6.  Abnormal baseline EKG with diffuse ST changes (unchanged)  7.  Acute hypoxic respiratory failure  8.  Diabetes with neuropathy and retinopathy  9.  History of CVA  10.  Chronic lower back pain  11.  History of angioedema secondary to ACE inhibitors  12.  History of amlodipine induced edema  13.  Obstructive sleep apnea, intolerant to BiPAP    Plan:  He still has quite a bit of fluid on board.  He did tell me that his left lower extremity always swells more than the right.  I suspect this is from venous insufficiency and prior significant hip surgery on the left side.    -Continue Lasix 40 mg IV every 8 hours per nephrology.    -Blood pressure is likely a driving factor for the congestive heart failure.  Continue hydralazine.  Again, he cannot take ACE inhibitors or ARB's because of a history of angioedema.  Similarly, he cannot take amlodipine secondary to previous edema.    -He is on oxygen at night only for his sleep apnea.  He cannot tolerate BiPAP.  This may also be contributing to the uncontrolled hypertension and edema.    -Continue aspirin.  No anginal symptoms.  He evidently does not take statins per his request.    Jeffrey Seymour MD  06/06/23  18:17 EDT

## 2023-06-06 NOTE — CASE MANAGEMENT/SOCIAL WORK
Continued Stay Note  [facility]     Patient Name: Dilip Verma  MRN: [id number]  Today's Date: 6/6/2023    Admit Date: 6/4/2023    Plan: Home with spouse and [facility] (referral pending).  Nocturnal O2@2LNC through [facility]. Follow for possible need for continuous O2 at D/C.   Discharge Plan       Row Name 06/06/23 1733       Plan    Plan Home with spouse and [facility] (referral pending).  Nocturnal O2@2LNC through [facility]. Follow for possible need for continuous O2 at D/C.    Patient/Family in Agreement with Plan yes    Plan Comments Met with pt. at bedside. Explained roll of . Face sheet and pharmacy verified. Pt lives with his wife.  There are 3 steps to enter home. Home DME equipment includes a glucometer, rollator, shower chair, and nocturnal O2@2LNC through [facility].  Pt currently requiring O2 continuously. Follow for possible changes in home O2 need at D/C. Pt requires assist with ADLs. Pt has been to [facility] for Rehab. [facility] and will use again. PT recommending home with spouse and HH. Referral for [facility] in [facility] and pending. Pt states his wife is setting him up with a PCP because their PCP retired. Uses [facility] on [facility]. Pt no longer drives. States his wife transports him to appointments. At discharge, family will transport. Explained that CCP would follow to assess for discharge needs.  Celestine Coleman RN-BC                   Discharge Codes    No documentation.                 Expected Discharge Date and Time       Expected Discharge Date Expected Discharge Time    Jun 8, 2023               Celestine Coleman RN     stomach

## 2023-06-06 NOTE — THERAPY EVALUATION
Patient Name: Dilip Verma  : 1950    MRN: [id number]                              Today's Date: 2023       Admit Date: 2023    Visit Dx:     ICD-10-CM ICD-9-CM   1. Severe uncontrolled hypertension  I10 401.9   2. Abnormal chest x-ray  R93.89 793.2   3. Uncontrolled type 2 diabetes mellitus with hyperglycemia  E11.65 250.02   4. Elevated troponin  R77.8 790.6     Patient Active Problem List   Diagnosis    Anxiety    Colon polyp    Type 2 diabetes mellitus with hyperglycemia, with long-term current use of insulin    Erectile dysfunction    Hyperlipidemia    Type 2 acute myocardial infarction    CAD (coronary artery disease)    Hx of CABG    Acute on chronic diastolic CHF (congestive heart failure)    Hypersomnia due to medical condition    CSA (central sleep apnea)    Periodic breathing    HTN (hypertension)    History of stroke    CKD (chronic kidney disease) stage 3, GFR 30-59 ml/min    Urinary retention    Acute on chronic renal failure    Acute on chronic diastolic (congestive) heart failure    Bacteriuria    Rectal pain    Status post total replacement of hip    Vitamin D deficiency    Post-acute COVID-19 syndrome    Insulin dose changed    Arthropathy associated with neurological disorder    Personal history of colonic polyps    Severe uncontrolled hypertension    Type 2 diabetes mellitus with diabetic neuropathy, with long-term current use of insulin     Past Medical History:   Diagnosis Date    AMENA (acute kidney injury) 2020    CORI positive     Anemia     Anxiety     Ataxia     Atypical chest pain 2022    SEEN AT [facility] ER    Balance disorder     Cataract     BILATERAL, S/P EXTRACTION    Cervical radiculopathy 2019    SEEN AT  [facility] ER    Cervical spinal cord compression     Chronic diastolic (congestive) heart failure     Chronic kidney disease     STAGE 3, FOLLOWED BY DR. VIVAR    Chronic pancreatitis     Closed left subtrochanteric femur fracture 2020    ADMITTED TO  "[facility]    Closed nondisplaced intertrochanteric fracture of left femur 12/01/2020    ADMITTED TO [facility]    Colon polyps     FOLLOWED BY DR. AVTAR JEONG    Constipation     Contracture, right hand     Coronary artery disease     CABG 7/2019    COVID-19 07/2022    DDD (degenerative disc disease), cervical     Diabetes mellitus, type II     IDDM    Diabetic retinopathy     Dysphagia     Elevated brain natriuretic peptide (BNP) level 08/2014    Elevated LFTs 03/2021    Erectile dysfunction     Foot drop     Fuchs' corneal dystrophy of right eye     Glaucoma     BILATERAL    History of alcohol abuse     Hyperlipidemia     Hypersomnia     Hypertension     Hypertensive urgency 02/25/2020    ADMITTED TO [facility]    Insomnia     Kidney stones     LV dysfunction 06/2016    Lyme disease     Lymphadenopathy syndrome 05/2021    Macular edema     BILATERAL    Myocardial infarction 11/05/2019    NSTEMI, ADMITTED TO [facility]    Myocardial infarction 07/12/2019    NSTEMI, ADMITTED TO [facility]    Non-celiac gluten sensitivity     Orthostasis     Osteoporosis     Oxygen dependent     PAD (peripheral artery disease)     PNA (pneumonia) 06/2016    LEFT LOBE    Polyneuropathy     PTSD (post-traumatic stress disorder)     Pulmonary hypertension     Pulmonary nodule     Senile ectropion of both lower eyelids 02/2022    Sepsis 12/16/2020    D/T UTI, ADMITTED TO [facility]    Sleep apnea     STATES DOESN'T USE BIPAP OR CPAP    Spinal stenosis in cervical region     SEVERE-LIMITED ROM    Stroke 2012    \"slight stroke\"    Syncope and collapse 07/06/2019    ADMITTED TO [facility]    Urinary retention 04/05/2021    SEEN AT [facility] ER    Vitamin D deficiency      Past Surgical History:   Procedure Laterality Date    CARDIAC CATHETERIZATION N/A 07/15/2019    Procedure: Coronary angiography;  Surgeon: Carrie Price MD;  Location:  [facility]VASIVE [facility];  Service: Cardiovascular    CARDIAC CATHETERIZATION N/A 07/15/2019    Procedure: Left Heart Cath;  Surgeon: Albert" MD Carrie;  Location:  [facility];  Service: Cardiovascular    CARDIAC CATHETERIZATION N/A 07/15/2019    Procedure: Left ventriculography;  Surgeon: Carrie Price MD;  [facility];  Service: Cardiovascular    CARDIAC CATHETERIZATION  07/15/2019    Procedure: Functional Flow [place];  Surgeon: Carrie Price MD;  Location:  [facility]IVE [facility]ATION;  Service: Cardiovascular    CARDIAC CATHETERIZATION N/A 11/06/2019    Procedure: Right and Left Heart Cath;  Surgeon: Mya Smith MD;  [facility];  Service: Cardiovascular    CARDIAC CATHETERIZATION N/A 11/06/2019    Procedure: Coronary angiography;  Surgeon: Mya Smith MD;  [facility];  Service: Cardiovascular    CARDIAC SURGERY      CATARACT EXTRACTION Left 2014    CATARACT EXTRACTION Right 11/2016    PHACO/IOL, DR. LEN DENTON    COLONOSCOPY N/A 03/16/2023    ENTIRE COLON WNL, RESCOPE IN 5 YRS, DR. LINDA LIZARRAGA AT [facility]    COLONOSCOPY W/ POLYPECTOMY N/A 01/02/2015    A FEW DIVERTICULA IN SIGMOID, 6 MM TUBULOVILLOUS ADENOMA POLYP IN RECTUM, SMALL HEMORRHOIDS, MELANOSIS COLI, DR. AVTAR JEONG AT [facility]    CORONARY ARTERY BYPASS GRAFT N/A 07/18/2019    Procedure: INTRAOPERATIVE SHOAIB; STERNOTOMY CORONARY ARTERY BYPASS x 3  USING LEFT INTERNAL MAMMARY ARTERY GRAFT UTILIZING ENDOSCOPICALLY HARVESTED RIGHT GREATER SAPHENOUS VEIN AND PRP.;  Surgeon: Bill Devi MD;  Location: [facility] MAIN OR;  Service: Cardiothoracic    CYSTOSCOPY BLADDER STONE LITHOTRIPSY N/A     DENTAL PROCEDURE Bilateral     3 surgeries inder implants    FEMUR IM NAILING/RODDING Left 12/03/2020    Procedure: LEFT HIP INTRAMEDULLARY NAIL;  Surgeon: Niraj Ravi MD;  Location: [facility] MAIN OR;  Service: Orthopedic Spine;  Laterality: Left;    INGUINAL HERNIA REPAIR Bilateral     TOENAIL EXCISION  06/2022    TONSILLECTOMY Bilateral     TOTAL HIP ARTHROPLASTY REVISION  Left 01/11/2022    Procedure: TOTAL HIP ARTHROPLASTY REVISION- POSTERIOR;  Surgeon: Jurgen Candelaria II, MD;  Location: [facility] MAIN OR;  Service: Orthopedics;  Laterality: Left;    VASECTOMY N/A       General Information       Row Name 06/06/23 1548          Physical Therapy Time and Intention    Document Type evaluation  -ST     Mode of Treatment individual therapy;physical therapy  -       Row Name 06/06/23 1548          General Information    Patient Profile Reviewed yes  -ST     Prior Level of Function independent:;min assist:  wife assists as needed at home  -ST     Existing Precautions/Restrictions fall  -ST     Barriers to Rehab medically complex;previous functional deficit  -ST       Row Name 06/06/23 1548          Living Environment    People in Home spouse  -ST       Row Name 06/06/23 1548          Home Main Entrance    Number of Stairs, Main Entrance other (see comments)  newly ramped entrance  -ST       Row Name 06/06/23 1548          Stairs Within Home, Primary    Number of Stairs, Within Home, Primary other (see comments)  has chair lift  -ST       Row Name 06/06/23 1548          Cognition    Orientation Status (Cognition) oriented x 4  -ST       Row Name 06/06/23 1548          Safety Issues, Functional Mobility    Safety Issues Affecting Function (Mobility) impulsivity  -ST     Impairments Affecting Function (Mobility) balance;endurance/activity tolerance  -ST     Comment, Safety Issues/Impairments (Mobility) nonskid socks donned  -ST               User Key  (r) = Recorded By, (t) = Taken By, (c) = Cosigned By      Initials Name Provider Type    ST Sahara Harrison PT Physical Therapist                   Mobility       Row Name 06/06/23 1549          Bed Mobility    Bed Mobility sit-supine  -ST     Sit-Supine [place] (Bed Mobility) standby assist  -ST     Comment, (Bed Mobility) sitting EOB upon arrival  -ST       Row Name 06/06/23 1549          Sit-Stand Transfer    Sit-Stand  [place] (Transfers) verbal cues;contact guard  -ST     Assistive Device (Sit-Stand Transfers) walker, standard  -ST       Row Name 06/06/23 1549          Gait/Stairs (Locomotion)    [place] Level (Gait) verbal cues;contact guard  -ST     Assistive Device (Gait) walker, standard  -ST     Distance in Feet (Gait) 15'  -ST     Deviations/Abnormal Patterns (Gait) bilateral deviations;farhana decreased;gait speed decreased;stride length decreased  -ST     Bilateral Gait Deviations heel strike decreased  -ST     Comment, (Gait/Stairs) no LOB  -ST               User Key  (r) = Recorded By, (t) = Taken By, (c) = Cosigned By      Initials Name Provider Type    ST Sahara Harrison, PT Physical Therapist                   Obj/Interventions       Row Name 06/06/23 1552          Range of Motion Comprehensive    Comment, General Range of Motion R UE limited from previous stroke  -ST       Row Name 06/06/23 1552          Strength Comprehensive (MMT)    Comment, General Manual Muscle Testing (MMT) Assessment R LE affected side from CVA - appears WFL, L hip appears weaker, reports hx of hip fx  -ST       Row Name 06/06/23 1552          Balance    Comment, Balance CGA for dynamic balance, no LOB  -ST               User Key  (r) = Recorded By, (t) = Taken By, (c) = Cosigned By      Initials Name Provider Type    ST Sahara Harrison, PT Physical Therapist                   Goals/Plan       Row Name 06/06/23 1553          Bed Mobility Goal 1 (PT)    Activity/Assistive Device (Bed Mobility Goal 1, PT) bed mobility activities, all  -ST     [place] Level/Cues Needed (Bed Mobility Goal 1, PT) modified independence  -ST     Time Frame (Bed Mobility Goal 1, PT) 1 week  -ST     Progress/Outcomes (Bed Mobility Goal 1, PT) new goal  -ST       Row Name 06/06/23 1553          Transfer Goal 1 (PT)    Activity/Assistive Device (Transfer Goal 1, PT) sit-to-stand/stand-to-sit;bed-to-chair/chair-to-bed;walker, rolling  -ST      [place] Level/Cues Needed (Transfer Goal 1, PT) modified independence  -ST     Time Frame (Transfer Goal 1, PT) 1 week  -ST     Progress/Outcome (Transfer Goal 1, PT) new goal  -ST       Row Name 06/06/23 1553          Gait Training Goal 1 (PT)    Activity/Assistive Device (Gait Training Goal 1, PT) gait (walking locomotion);assistive device use;walker, rolling  -ST     [place] Level (Gait Training Goal 1, PT) standby assist  -ST     Distance (Gait Training Goal 1, PT) 50'  -ST     Time Frame (Gait Training Goal 1, PT) 1 week  -ST     Progress/Outcome (Gait Training Goal 1, PT) new goal  -ST               User Key  (r) = Recorded By, (t) = Taken By, (c) = Cosigned By      Initials Name Provider Type    Sahara Jacobson, PT Physical Therapist                   Clinical Impression       Row Name 06/06/23 1553          Pain    Pretreatment Pain Rating 0/10 - no pain  -ST     Posttreatment Pain Rating 0/10 - no pain  -ST     Pre/Posttreatment Pain Comment reports chronic neck pain controlled currently  -     Pain Intervention(s) Repositioned;Ambulation/increased activity  -ST       Row Name 06/06/23 1553          Plan of Care Review    Plan of Care Reviewed With patient  -       Row Name 06/06/23 1556          Therapy Assessment/Plan (PT)    Patient/Family Therapy Goals Statement (PT) Pt is 72 y/o M admitted to [facility] on 6/4/23 with flu like sxs. At baseline pt is indep with mobility using 4ww. Lives with spouse who is able to assist as needed. Ramped entrance. Pt currently demos CGA for transfers and ambulation up to 15' using walker with no LOB. Mild unsteadiness noted, but good safety awareness noted. Pt demos mobility below baseline and therefore would benefit from acute skilled PT to address functional mobility deficits. Anticipate d/c home with spouse and home PT.  -ST     Rehab Potential (PT) good, to achieve stated therapy goals  -ST     Criteria for Skilled Interventions Met (PT) yes  -ST      Therapy Frequency (PT) 5 times/wk  -ST     Predicted Duration of Therapy Intervention (PT) 1 week  -ST       Row Name 06/06/23 1553          Positioning and Restraints    Pre-Treatment Position in bed  -ST     Post Treatment Position bed  -ST     In Bed supine;call light within reach;encouraged to call for assist;exit alarm on  -ST               User Key  (r) = Recorded By, (t) = Taken By, (c) = Cosigned By      Initials Name Provider Type    Sahara Jacobson PT Physical Therapist                   Outcome Measures       Row Name 06/06/23 1554 06/06/23 0815       How much help from another person do you currently need...    Turning from your back to your side while in flat bed without using bedrails? 4  -ST 3  -LK    Moving from lying on back to sitting on the side of a flat bed without bedrails? 3  -ST 3  -LK    Moving to and from a bed to a chair (including a wheelchair)? 3  -ST 3  -LK    Standing up from a chair using your arms (e.g., wheelchair, bedside chair)? 3  -ST 3  -LK    Climbing 3-5 steps with a railing? 2  -ST 2  -LK    To walk in hospital room? 3  -ST 3  -LK    AM-PAC 6 Clicks Score (PT) 18  -ST 17  -LK    Highest level of mobility 6 --> Walked 10 steps or more  -ST 5 --> Static standing  -LK              User Key  (r) = Recorded By, (t) = Taken By, (c) = Cosigned By      Initials Name Provider Type    Tawanna Morales, RN Registered Nurse    Sahara Jacobson PT Physical Therapist                                 Physical Therapy Education       Title: PT OT SLP Therapies (In Progress)       Topic: Physical Therapy (In Progress)       Point: Mobility training (Done)       Learning Progress Summary             Patient Acceptance, E,TB, VU,NR by  at 6/6/2023 1554                         Point: Home exercise program (Not Started)       Learner Progress:  Not documented in this visit.              Point: Body mechanics (Done)       Learning Progress Summary             Patient Acceptance,  E,TB, VU,NR by ST at 6/6/2023 1554                         Point: Precautions (Not Started)       Learner Progress:  Not documented in this visit.                              User Key       Initials Effective Dates Name Provider Type Discipline    ST 09/22/22 -  Sahara Harrison PT Physical Therapist PT                  PT Recommendation and Plan     Plan of Care Reviewed With: patient     Time Calculation:    PT Charges       Row Name 06/06/23 1556             Time Calculation    Start Time 1509  -ST      Stop Time 1529  -ST      Time Calculation (min) 20 min  -ST      PT Received On 06/06/23  -ST      PT - Next Appointment 06/07/23  -ST      PT Goal Re-Cert Due Date 06/13/23  -ST         Time Calculation- PT    Total Timed Code Minutes- PT 0 minute(s)  -ST                User Key  (r) = Recorded By, (t) = Taken By, (c) = Cosigned By      Initials Name Provider Type    ST Sahara Harrison, PT Physical Therapist                  Therapy Charges for Today       Code Description Service Date Service Provider Modifiers Qty    14604453751 HC PT EVAL MOD COMPLEXITY 2 6/6/2023 Sahara Harrison, PT GP 1            PT G-Codes  AM-PAC 6 Clicks Score (PT): 18  PT Discharge Summary  Anticipated Discharge Disposition (PT): home with assist, home with home health    Sahara Harrison PT  6/6/2023

## 2023-06-06 NOTE — CASE MANAGEMENT/SOCIAL WORK
Discharge Planning Assessment  [facility]     Patient Name: Dilip Verma  MRN: [id number]  Today's Date: 6/6/2023    Admit Date: 6/4/2023    Plan: Home with spouse and [facility]  (referral pending).  Nocturnal O2@2LNC through [facility]. Follow for possible need for continuous O2 at D/C.   Discharge Needs Assessment       Row Name 06/06/23 1717       Living Environment    People in Home spouse    Current Living Arrangements home    Potentially Unsafe Housing Conditions none    Primary Care Provided by self;spouse/significant other    Provides Primary Care For no one, unable/limited ability to care for self    Family Caregiver if Needed spouse    Quality of Family Relationships supportive;involved    Able to Return to Prior Arrangements yes       Resource/Environmental Concerns    Resource/Environmental Concerns none    Transportation Concerns none       Transition Planning    Patient/Family Anticipates Transition to home with family    Patient/Family Anticipated Services at Transition home health care    Transportation Anticipated family or friend will provide       Discharge Needs Assessment    Readmission Within the Last 30 Days no previous admission in last 30 days    Equipment Currently Used at Home glucometer;rollator;shower chair;oxygen  Home O2@2LNC at night only through [facility]    Concerns to be Addressed care coordination/care conferences;discharge planning    Anticipated Changes Related to Illness none    Equipment Needed After Discharge none    Discharge Facility/Level of Care Needs home with home health    Provided Post Acute Provider List? Refused    N/A Provider List Comment Requests [facility] as they have used in the past    Provided Post Acute Provider Quality & Resource List? Refused    Current Discharge Risk chronically ill;dependent with mobility/activities of daily living                   Discharge Plan       Row Name 06/06/23 3554       Plan    Plan Home with spouse and [facility]   (referral pending).  Nocturnal O2@2LNC through [facility]. Follow for possible need for continuous O2 at D/C.    Patient/Family in Agreement with Plan yes    Plan Comments Met with pt. at bedside. Explained roll of . Face sheet and pharmacy verified. Pt lives with his wife.  There are 3 steps to enter home. Home DME equipment includes a glucometer, rollator, shower chair, and nocturnal O2@2LNC through [facility].  Pt currently requiring O2 continuously. Follow for possible changes in home O2 need at D/C. Pt requires assist with ADLs. Pt has been to [facility] for Rehab. He has used [facility] and will use again. PT recommending home with spouse and HH. Referral for [facility] and pending. Pt states his wife is setting him up with a PCP because their PCP retired. Uses [facility] on [address]. Pt no longer drives. States his wife transports him to appointments. At discharge, family will transport. Explained that CCP would follow to assess for discharge needs.  Celestine Coleman RN-BC                  Continued Care and Services - Admitted Since 6/4/2023       Home Medical Care       Service Provider Request Status Selected Services Address Phone Fax Patient Preferred    [facility] Pending - Request Sent N/A [address], [address], [facility] [address] [phone] --                  Expected Discharge Date and Time       Expected Discharge Date Expected Discharge Time    Jun 8, 2023            Demographic Summary       Row Name 06/06/23 1716       General Information    Admission Type inpatient    Arrived From emergency department    Required Notices Provided Important Message from Medicare    Reason for Consult discharge planning;decision-making;care coordination/care conference    Preferred Language English                   Functional Status       Row Name 06/06/23 1716       Functional Status    Usual Activity Tolerance  moderate    Current Activity Tolerance fair       Functional Status, IADL    Medications assistive equipment and person    Meal Preparation completely dependent    Housekeeping completely dependent    Laundry completely dependent    Shopping completely dependent       Mental Status    General Appearance WDL WDL       Mental Status Summary    Recent Changes in Mental Status/Cognitive Functioning no changes       Employment/    Employment Status retired                 Celestine Coleman, RN

## 2023-06-06 NOTE — PLAN OF CARE
Goal Outcome Evaluation:  VSS. Pt weaned to 1L O2 per humidified NC. IV lasix Q8 hours continued, possible transition to PO diuretics tomorrow. - orthostatics. PO hydralazine Q8 hours continued. Pt ambulated with PT and nursing staff. Pt stable and needs met at this time.

## 2023-06-07 LAB
ANION GAP SERPL CALCULATED.3IONS-SCNC: 9.9 MMOL/L (ref 5–15)
BASOPHILS # BLD AUTO: 0.03 10*3/MM3 (ref 0–0.2)
BASOPHILS NFR BLD AUTO: 0.3 % (ref 0–1.5)
BUN SERPL-MCNC: 33 MG/DL (ref 8–23)
BUN/CREAT SERPL: 24.6 (ref 7–25)
CALCIUM SPEC-SCNC: 9.4 MG/DL (ref 8.6–10.5)
CHLORIDE SERPL-SCNC: 100 MMOL/L (ref 98–107)
CO2 SERPL-SCNC: 30.1 MMOL/L (ref 22–29)
CREAT SERPL-MCNC: 1.34 MG/DL (ref 0.76–1.27)
DEPRECATED RDW RBC AUTO: 44 FL (ref 37–54)
EGFRCR SERPLBLD CKD-EPI 2021: 55.6 ML/MIN/1.73
EOSINOPHIL # BLD AUTO: 0.29 10*3/MM3 (ref 0–0.4)
EOSINOPHIL NFR BLD AUTO: 3.2 % (ref 0.3–6.2)
ERYTHROCYTE [DISTWIDTH] IN BLOOD BY AUTOMATED COUNT: 13.1 % (ref 12.3–15.4)
GLUCOSE BLDC GLUCOMTR-MCNC: 131 MG/DL (ref 70–130)
GLUCOSE BLDC GLUCOMTR-MCNC: 136 MG/DL (ref 70–130)
GLUCOSE BLDC GLUCOMTR-MCNC: 172 MG/DL (ref 70–130)
GLUCOSE BLDC GLUCOMTR-MCNC: 210 MG/DL (ref 70–130)
GLUCOSE SERPL-MCNC: 161 MG/DL (ref 65–99)
HCT VFR BLD AUTO: 45.1 % (ref 37.5–51)
HGB BLD-MCNC: 14.9 G/DL (ref 13–17.7)
IMM GRANULOCYTES # BLD AUTO: 0.03 10*3/MM3 (ref 0–0.05)
IMM GRANULOCYTES NFR BLD AUTO: 0.3 % (ref 0–0.5)
LYMPHOCYTES # BLD AUTO: 2.04 10*3/MM3 (ref 0.7–3.1)
LYMPHOCYTES NFR BLD AUTO: 22.7 % (ref 19.6–45.3)
MCH RBC QN AUTO: 30.3 PG (ref 26.6–33)
MCHC RBC AUTO-ENTMCNC: 33 G/DL (ref 31.5–35.7)
MCV RBC AUTO: 91.9 FL (ref 79–97)
MONOCYTES # BLD AUTO: 0.75 10*3/MM3 (ref 0.1–0.9)
MONOCYTES NFR BLD AUTO: 8.4 % (ref 5–12)
NEUTROPHILS NFR BLD AUTO: 5.84 10*3/MM3 (ref 1.7–7)
NEUTROPHILS NFR BLD AUTO: 65.1 % (ref 42.7–76)
NRBC BLD AUTO-RTO: 0 /100 WBC (ref 0–0.2)
PLATELET # BLD AUTO: 224 10*3/MM3 (ref 140–450)
PMV BLD AUTO: 10.6 FL (ref 6–12)
POTASSIUM SERPL-SCNC: 4.1 MMOL/L (ref 3.5–5.2)
RBC # BLD AUTO: 4.91 10*6/MM3 (ref 4.14–5.8)
SODIUM SERPL-SCNC: 140 MMOL/L (ref 136–145)
WBC NRBC COR # BLD: 8.98 10*3/MM3 (ref 3.4–10.8)

## 2023-06-07 PROCEDURE — 25010000002 FUROSEMIDE PER 20 MG: Performed by: STUDENT IN AN ORGANIZED HEALTH CARE EDUCATION/TRAINING PROGRAM

## 2023-06-07 PROCEDURE — 82948 REAGENT STRIP/BLOOD GLUCOSE: CPT

## 2023-06-07 PROCEDURE — 63710000001 INSULIN LISPRO (HUMAN) PER 5 UNITS: Performed by: NURSE PRACTITIONER

## 2023-06-07 PROCEDURE — 25010000002 ONDANSETRON PER 1 MG: Performed by: NURSE PRACTITIONER

## 2023-06-07 PROCEDURE — 85025 COMPLETE CBC W/AUTO DIFF WBC: CPT | Performed by: INTERNAL MEDICINE

## 2023-06-07 PROCEDURE — 99214 OFFICE O/P EST MOD 30 MIN: CPT | Performed by: INTERNAL MEDICINE

## 2023-06-07 PROCEDURE — 25010000002 ENOXAPARIN PER 10 MG: Performed by: INTERNAL MEDICINE

## 2023-06-07 PROCEDURE — 97116 GAIT TRAINING THERAPY: CPT

## 2023-06-07 PROCEDURE — 97530 THERAPEUTIC ACTIVITIES: CPT

## 2023-06-07 PROCEDURE — 80048 BASIC METABOLIC PNL TOTAL CA: CPT | Performed by: INTERNAL MEDICINE

## 2023-06-07 RX ORDER — FUROSEMIDE 40 MG/1
40 TABLET ORAL
Status: DISCONTINUED | OUTPATIENT
Start: 2023-06-07 | End: 2023-06-08 | Stop reason: HOSPADM

## 2023-06-07 RX ORDER — HYDRALAZINE HYDROCHLORIDE 50 MG/1
50 TABLET, FILM COATED ORAL EVERY 8 HOURS SCHEDULED
Status: DISCONTINUED | OUTPATIENT
Start: 2023-06-07 | End: 2023-06-08 | Stop reason: HOSPADM

## 2023-06-07 RX ADMIN — ENOXAPARIN SODIUM 40 MG: 100 INJECTION SUBCUTANEOUS at 14:18

## 2023-06-07 RX ADMIN — DOCUSATE SODIUM 50MG AND SENNOSIDES 8.6MG 2 TABLET: 8.6; 5 TABLET, FILM COATED ORAL at 09:34

## 2023-06-07 RX ADMIN — ASPIRIN 81 MG: 81 TABLET, CHEWABLE ORAL at 09:35

## 2023-06-07 RX ADMIN — Medication 5000 UNITS: at 09:35

## 2023-06-07 RX ADMIN — HYDRALAZINE HYDROCHLORIDE 25 MG: 25 TABLET, FILM COATED ORAL at 14:18

## 2023-06-07 RX ADMIN — OXYMETAZOLINE HYDROCHLORIDE 2 SPRAY: 0.05 SPRAY NASAL at 22:04

## 2023-06-07 RX ADMIN — OXYMETAZOLINE HYDROCHLORIDE 2 SPRAY: 0.05 SPRAY NASAL at 09:36

## 2023-06-07 RX ADMIN — LIDOCAINE 3 PATCH: 50 PATCH CUTANEOUS at 09:35

## 2023-06-07 RX ADMIN — FUROSEMIDE 40 MG: 40 TABLET ORAL at 17:14

## 2023-06-07 RX ADMIN — HYDRALAZINE HYDROCHLORIDE 25 MG: 25 TABLET, FILM COATED ORAL at 09:35

## 2023-06-07 RX ADMIN — FUROSEMIDE 40 MG: 40 INJECTION, SOLUTION INTRAMUSCULAR; INTRAVENOUS at 09:35

## 2023-06-07 RX ADMIN — INSULIN LISPRO 2 UNITS: 100 INJECTION, SOLUTION INTRAVENOUS; SUBCUTANEOUS at 22:05

## 2023-06-07 RX ADMIN — ONDANSETRON 4 MG: 2 INJECTION INTRAMUSCULAR; INTRAVENOUS at 17:14

## 2023-06-07 RX ADMIN — Medication 10 ML: at 09:36

## 2023-06-07 RX ADMIN — DOCUSATE SODIUM 50MG AND SENNOSIDES 8.6MG 2 TABLET: 8.6; 5 TABLET, FILM COATED ORAL at 22:05

## 2023-06-07 RX ADMIN — INSULIN GLARGINE-YFGN 27 UNITS: 100 INJECTION, SOLUTION SUBCUTANEOUS at 09:34

## 2023-06-07 RX ADMIN — HYDRALAZINE HYDROCHLORIDE 50 MG: 50 TABLET, FILM COATED ORAL at 22:05

## 2023-06-07 RX ADMIN — INSULIN LISPRO 4 UNITS: 100 INJECTION, SOLUTION INTRAVENOUS; SUBCUTANEOUS at 12:47

## 2023-06-07 NOTE — PROGRESS NOTES
"      FOLLOW UP NOTE      Name: Dilip Verma ADMIT: 2023   : 1950  PCP: System, Provider Not In    MRN: [id number] LOS: 1 days   AGE/SEX: 73 y.o. male  ROOM: [facility]/     Date of Service: 2023                           CHIEF COMPLIANTS / REASON FOR FOLLOW UP                Subjective:        Patient reports swelling is much better, ambulating  Urine output 1.3 L       Review of Systems:         Constitutional: No fever, no chills, no lethargy, no weakness.  HEENT:  No headache, otalgia, itchy eyes, nasal discharge or sore throat.  Cardiac:  No chest pain, dyspnea, orthopnea or PND.  Chest:  No cough, phlegm or wheezing.  Abdomen:  No abdominal pain, nausea or vomiting.  Neuro:  No focal weakness, abnormal movements or seizure-like activity.  :   No hematuria, no pyuria, no dysuria, no flank pain.  Extremities:  No  joint pains.  ROS was otherwise negative except as mentioned in the [place].        OBJECTIVE                                                                        Exam:  /89 (BP Location: Left arm, Patient Position: Lying)   Pulse 70   Temp 97.9 °F (36.6 °C) (Oral)   Resp 18   Ht 175.3 cm (69\")   Wt 89.7 kg (197 lb 12.8 oz)   SpO2 94%   BMI 29.21 kg/m²   Intake/Output last 3 shifts:  I/O last 3 completed shifts:  In: 700 [P.O.:700]  Out: 2020 [Urine:2020]  Intake/Output this shift:  No intake/output data recorded.    General Appearance:  Alert, cooperative, no distress, appears stated age  Head:  Normocephalic, without obvious abnormality, atraumatic  Eyes:  Sclerae anicteric, EOM's intact     Neck: No JVD  Lungs:   Clear to auscultation bilaterally, respirations unlabored  Heart:  Regular rate and rhythm, S1 and S2 normal, no  rub   or gallop  Abdomen:  Soft, nontender,  Extremities:  Extremities normal, 1+ edema i improved  Neurologic:   Alert and oriented, no focal deficits    Scheduled Meds:aspirin, 81 mg, Oral, Daily  baclofen, 10 mg, Oral, Q8H  cholecalciferol, 5,000 " Units, Oral, Daily  enoxaparin, 40 mg, Subcutaneous, Q24H  furosemide, 40 mg, Oral, BID  hydrALAZINE, 25 mg, Oral, Q8H  insulin glargine, 27 Units, Subcutaneous, Daily  insulin lispro, 2-9 Units, Subcutaneous, 4x Daily AC & at Bedtime  latanoprost, 1 drop, Both Eyes, Nightly  lidocaine, 3 patch, Transdermal, Q24H  oxymetazoline, 2 spray, Each Nare, BID  senna-docusate sodium, 2 tablet, Oral, BID      Continuous Infusions:niCARdipine, 5-15 mg/hr, Last Rate: Stopped (06/05/23 1040)      PRN Meds:  acetaminophen    aluminum-magnesium hydroxide-simethicone    senna-docusate sodium **AND** polyethylene glycol **AND** bisacodyl **AND** bisacodyl    dextrose    dextrose    glucagon (human recombinant)    melatonin    nitroglycerin    ondansetron **OR** ondansetron    sodium chloride    traMADol         Data Review:                                                                           Labs reviewed        Imaging:                                                                           Radiology reviewed           ASSESSMENT:                                                                                Severe uncontrolled hypertension    Type 2 diabetes mellitus with hyperglycemia, with long-term current use of insulin    Hyperlipidemia    Type 2 acute myocardial infarction    CAD (coronary artery disease)    Hx of CABG    HTN (hypertension)    Acute on chronic diastolic (congestive) heart failure    Type 2 diabetes mellitus with diabetic neuropathy, with long-term current use of insulin       Chronic kidney disease stage III  Fluid overload  Uncontrolled hypertension  Heart failure with preserved ejection fraction  Coronary artery disease  Glycosuria      PLAN:                                                                            Fluid overload in a patient with underlying chronic kidney disease stage III, with significant weight gain of nearly 20 pounds and orthopnea with uncontrolled hypertension.  Volume  overload improved with diuretics, weight has come down, mild contraction alkalosis  Switch to p.o. Lasix 40 mg twice daily  HTN better controlled with diuretics and starting hydralazine: continue   2 g sodium restricted diet  Daily standing weights  Last TTE with LVEF 66% and grade 2 diastolic dysfunction.  No proteinuria, glycosuria probably due to hyperglycemia.  Angioedema with ACE inhibitors and swelling with amlodipine, avoid both of the above.  Some degree of edema is chronic    Will require close outpatient follow-up with Dr. Tiarra Saez MD  [facility]  6/7/2023  11:49 EDT

## 2023-06-07 NOTE — PLAN OF CARE
Goal Outcome Evaluation:  Plan of Care Reviewed With: patient, spouse        Progress: improving  Outcome Evaluation: Pt seen for PT this AM. Noted to be on RA throughout session, does de-sat to 87% following activity but recovers to 89% within a few seconds. Progressed to SBA/CGA for transfers and progressed ambulation distance to 100' CGA this date. Distance limited by fatigue and observing pt's socks being to slide down his leg. Pt noted to have good safety awareness and no LOB at this time. he continues to benfit from skilled PT to address endurance and balance. Recommend home PT at d/c for improved functional gains.

## 2023-06-07 NOTE — PLAN OF CARE
Goal Outcome Evaluation:   Vitals stable on 1-2L NC. IV lasix continued. Standing weight obtained. Urinal at bedside. X2 assist when standing + walker. Legs elevated and SCDs worn while sleeping.

## 2023-06-07 NOTE — THERAPY TREATMENT NOTE
Patient Name: Dilip Verma  : 1950    MRN: [id number]                              Today's Date: 2023       Admit Date: 2023    Visit Dx:     ICD-10-CM ICD-9-CM   1. Severe uncontrolled hypertension  I10 401.9   2. Abnormal chest x-ray  R93.89 793.2   3. Uncontrolled type 2 diabetes mellitus with hyperglycemia  E11.65 250.02   4. Elevated troponin  R77.8 790.6     Patient Active Problem List   Diagnosis    Anxiety    Colon polyp    Type 2 diabetes mellitus with hyperglycemia, with long-term current use of insulin    Erectile dysfunction    Hyperlipidemia    Type 2 acute myocardial infarction    CAD (coronary artery disease)    Hx of CABG    Acute on chronic diastolic CHF (congestive heart failure)    Hypersomnia due to medical condition    CSA (central sleep apnea)    Periodic breathing    HTN (hypertension)    History of stroke    CKD (chronic kidney disease) stage 3, GFR 30-59 ml/min    Urinary retention    Acute on chronic renal failure    Acute on chronic diastolic (congestive) heart failure    Bacteriuria    Rectal pain    Status post total replacement of hip    Vitamin D deficiency    Post-acute COVID-19 syndrome    Insulin dose changed    Arthropathy associated with neurological disorder    Personal history of colonic polyps    Severe uncontrolled hypertension    Type 2 diabetes mellitus with diabetic neuropathy, with long-term current use of insulin     Past Medical History:   Diagnosis Date    AMENA (acute kidney injury) 2020    CORI positive     Anemia     Anxiety     Ataxia     Atypical chest pain 2022    SEEN AT [facility] ER    Balance disorder     Cataract     BILATERAL, S/P EXTRACTION    Cervical radiculopathy 2019    SEEN AT  [facility] ER    Cervical spinal cord compression     Chronic diastolic (congestive) heart failure     Chronic kidney disease     STAGE 3, FOLLOWED BY DR. VIVAR    Chronic pancreatitis     Closed left subtrochanteric femur fracture 2020    ADMITTED TO  "[facility]    Closed nondisplaced intertrochanteric fracture of left femur 12/01/2020    ADMITTED TO [facility]    Colon polyps     FOLLOWED BY DR. AVTAR JEONG    Constipation     Contracture, right hand     Coronary artery disease     CABG 7/2019    COVID-19 07/2022    DDD (degenerative disc disease), cervical     Diabetes mellitus, type II     IDDM    Diabetic retinopathy     Dysphagia     Elevated brain natriuretic peptide (BNP) level 08/2014    Elevated LFTs 03/2021    Erectile dysfunction     Foot drop     Fuchs' corneal dystrophy of right eye     Glaucoma     BILATERAL    History of alcohol abuse     Hyperlipidemia     Hypersomnia     Hypertension     Hypertensive urgency 02/25/2020    ADMITTED TO [facility]    Insomnia     Kidney stones     LV dysfunction 06/2016    Lyme disease     Lymphadenopathy syndrome 05/2021    Macular edema     BILATERAL    Myocardial infarction 11/05/2019    NSTEMI, ADMITTED TO [facility]    Myocardial infarction 07/12/2019    NSTEMI, ADMITTED TO [facility]    Non-celiac gluten sensitivity     Orthostasis     Osteoporosis     Oxygen dependent     PAD (peripheral artery disease)     PNA (pneumonia) 06/2016    LEFT LOBE    Polyneuropathy     PTSD (post-traumatic stress disorder)     Pulmonary hypertension     Pulmonary nodule     Senile ectropion of both lower eyelids 02/2022    Sepsis 12/16/2020    D/T UTI, ADMITTED TO [facility]    Sleep apnea     STATES DOESN'T USE BIPAP OR CPAP    Spinal stenosis in cervical region     SEVERE-LIMITED ROM    Stroke 2012    \"slight stroke\"    Syncope and collapse 07/06/2019    ADMITTED TO [place]    Urinary retention 04/05/2021    SEEN AT [facility] ER    Vitamin D deficiency      Past Surgical History:   Procedure Laterality Date    CARDIAC CATHETERIZATION N/A 07/15/2019    Procedure: Coronary angiography;  Surgeon: Carrie Price MD;  Location:  [facility] CATH INVASIVE LOCATION;  Service: Cardiovascular    CARDIAC CATHETERIZATION N/A 07/15/2019    Procedure: Left Heart Cath;  Surgeon: Albert" MD Carrie;  Location:  [facility] LOCATION;  Service: Cardiovascular    CARDIAC CATHETERIZATION N/A 07/15/2019    Procedure: Left ventriculography;  Surgeon: Carrie Price MD;  [facility];  Service: Cardiovascular    CARDIAC CATHETERIZATION  07/15/2019    Procedure: Functional Flow [place];  Surgeon: Carrie Price MD;  Location:  [facility] CATH INVASIVE LOCATION;  Service: Cardiovascular    CARDIAC CATHETERIZATION N/A 11/06/2019    Procedure: Right and Left Heart Cath;  Surgeon: Mya Smith MD;  [facility];  Service: Cardiovascular    CARDIAC CATHETERIZATION N/A 11/06/2019    Procedure: Coronary angiography;  Surgeon: Mya Smith MD;  [facility];  Service: Cardiovascular    CARDIAC SURGERY      CATARACT EXTRACTION Left 2014    CATARACT EXTRACTION Right 11/2016    PHACO/IOL, DR. LEN DENTON    COLONOSCOPY N/A 03/16/2023    ENTIRE COLON WNL, RESCOPE IN 5 YRS, DR. LINDA LIZARRAGA AT [facility]    COLONOSCOPY W/ POLYPECTOMY N/A 01/02/2015    A FEW DIVERTICULA IN SIGMOID, 6 MM TUBULOVILLOUS ADENOMA POLYP IN RECTUM, SMALL HEMORRHOIDS, MELANOSIS COLI, DR. AVTAR JEONG AT [facility]    CORONARY ARTERY BYPASS GRAFT N/A 07/18/2019    Procedure: INTRAOPERATIVE SHOAIB; STERNOTOMY CORONARY ARTERY BYPASS x 3  USING LEFT INTERNAL MAMMARY ARTERY GRAFT UTILIZING ENDOSCOPICALLY HARVESTED RIGHT GREATER SAPHENOUS VEIN AND PRP.;  Surgeon: Bill Devi MD;  Location: [facility] MAIN OR;  Service: Cardiothoracic    CYSTOSCOPY BLADDER STONE LITHOTRIPSY N/A     DENTAL PROCEDURE Bilateral     3 surgeries inder implants    FEMUR IM NAILING/RODDING Left 12/03/2020    Procedure: LEFT HIP INTRAMEDULLARY NAIL;  Surgeon: Niraj Ravi MD;  Location: [facility] MAIN OR;  Service: Orthopedic Spine;  Laterality: Left;    INGUINAL HERNIA REPAIR Bilateral     TOENAIL EXCISION  06/2022    TONSILLECTOMY Bilateral     TOTAL HIP ARTHROPLASTY REVISION  Left 01/11/2022    Procedure: TOTAL HIP ARTHROPLASTY REVISION- POSTERIOR;  Surgeon: Jurgen Candelaria II, MD;  Location: [facility] MAIN OR;  Service: Orthopedics;  Laterality: Left;    VASECTOMY N/A       General Information       Row Name 06/07/23 1208          Physical Therapy Time and Intention    Document Type therapy note (daily note)  -     Mode of Treatment individual therapy;physical therapy  -       Row Name 06/07/23 1208          General Information    Patient Profile Reviewed yes  -ST     Existing Precautions/Restrictions fall  -       Row Name 06/07/23 1208          Cognition    Orientation Status (Cognition) oriented x 4  -ST       Row Name 06/07/23 1208          Safety Issues, Functional Mobility    Impairments Affecting Function (Mobility) balance;endurance/activity tolerance  -     Comment, Safety Issues/Impairments (Mobility) nonskid socks, gait belt donned  -               User Key  (r) = Recorded By, (t) = Taken By, (c) = Cosigned By      Initials Name Provider Type    ST Sahara Harrison PT Physical Therapist                   Mobility       Row Name 06/07/23 1209          Bed Mobility    Comment, (Bed Mobility) UIC upon arrival  -       Row Name 06/07/23 1209          Sit-Stand Transfer    Sit-Stand [place] (Transfers) verbal cues;contact guard;standby assist  -     Assistive Device (Sit-Stand Transfers) walker, front-wheeled  -       Row Name 06/07/23 1209          Gait/Stairs (Locomotion)    [place] Level (Gait) verbal cues;contact guard  -ST     Assistive Device (Gait) walker, front-wheeled  -     Distance in Feet (Gait) 100'  -ST     Deviations/Abnormal Patterns (Gait) bilateral deviations;farhana decreased;gait speed decreased;stride length decreased;left sided deviations  -ST     Bilateral Gait Deviations heel strike decreased  -ST     Left Sided Gait Deviations foot drop/toe drag;foot slap;knee hyperextension  -ST     [place] Level (Stairs) not  tested  -ST     Comment, (Gait/Stairs) pt fatigues with ambulation and noted that socks start sliding off of pt's feet with ambulation - good safety awareness noted with this  -ST               User Key  (r) = Recorded By, (t) = Taken By, (c) = Cosigned By      Initials Name Provider Type    Sahara Jacobson, PT Physical Therapist                   Obj/Interventions       Row Name 06/07/23 1210          Balance    Comment, Balance CGA for dynamic balance, no LOB  -ST               User Key  (r) = Recorded By, (t) = Taken By, (c) = Cosigned By      Initials Name Provider Type    Sahara Jacobson, PT Physical Therapist                   Goals/Plan    No documentation.                  Clinical Impression       Row Name 06/07/23 1210          Pain    Pretreatment Pain Rating 0/10 - no pain  -ST     Posttreatment Pain Rating 0/10 - no pain  -ST       Row Name 06/07/23 1210          Plan of Care Review    Plan of Care Reviewed With patient;spouse  -ST     Progress improving  -ST     Outcome Evaluation Pt seen for PT this AM. Noted to be on RA throughout session, does de-sat to 87% following activity but recovers to 89% within a few seconds. Progressed to SBA/CGA for transfers and progressed ambulation distance to 100' CGA this date. Distance limited by fatigue and observing pt's socks being to slide down his leg. Pt noted to have good safety awareness and no LOB at this time. he continues to benfit from skilled PT to address endurance and balance. Recommend home PT at d/c for improved functional gains.  -ST       Row Name 06/07/23 1210          Therapy Assessment/Plan (PT)    Rehab Potential (PT) good, to achieve stated therapy goals  -ST     Criteria for Skilled Interventions Met (PT) yes  -ST     Therapy Frequency (PT) 6 times/wk  -ST       Row Name 06/07/23 1210          Positioning and Restraints    Pre-Treatment Position sitting in chair/recliner  -ST     Post Treatment Position chair  -ST     In Chair  reclined;call light within reach;encouraged to call for assist;exit alarm on;with family/caregiver  -ST               User Key  (r) = Recorded By, (t) = Taken By, (c) = Cosigned By      Initials Name Provider Type    Sahara Jacobson PT Physical Therapist                   Outcome Measures       Row Name 06/07/23 1212          How much help from another person do you currently need...    Turning from your back to your side while in flat bed without using bedrails? 4  -ST     Moving from lying on back to sitting on the side of a flat bed without bedrails? 3  -ST     Moving to and from a bed to a chair (including a wheelchair)? 3  -ST     Standing up from a chair using your arms (e.g., wheelchair, bedside chair)? 3  -ST     Climbing 3-5 steps with a railing? 2  -ST     To walk in hospital room? 3  -ST     AM-PAC 6 Clicks Score (PT) 18  -ST     Highest level of mobility 6 --> Walked 10 steps or more  -ST               User Key  (r) = Recorded By, (t) = Taken By, (c) = Cosigned By      Initials Name Provider Type    Sahara Jacobson PT Physical Therapist                                 Physical Therapy Education       Title: PT OT SLP Therapies (In Progress)       Topic: Physical Therapy (In Progress)       Point: Mobility training (Done)       Learning Progress Summary             Patient Acceptance, E,TB, VU,NR by ST at 6/7/2023 1213    Acceptance, E,TB, VU,NR by ST at 6/6/2023 1554                         Point: Home exercise program (Not Started)       Learner Progress:  Not documented in this visit.              Point: Body mechanics (Done)       Learning Progress Summary             Patient Acceptance, E,TB, VU,NR by ST at 6/7/2023 1213    Acceptance, E,TB, VU,NR by ST at 6/6/2023 1554                         Point: Precautions (Done)       Learning Progress Summary             Patient Acceptance, E,TB, VU,NR by ST at 6/7/2023 1213                                         User Key       Initials  Effective Dates Name Provider Type Discipline    ST 09/22/22 -  Sahara Harrison PT Physical Therapist PT                  PT Recommendation and Plan     Plan of Care Reviewed With: patient, spouse  Progress: improving  Outcome Evaluation: Pt seen for PT this AM. Noted to be on RA throughout session, does de-sat to 87% following activity but recovers to 89% within a few seconds. Progressed to SBA/CGA for transfers and progressed ambulation distance to 100' CGA this date. Distance limited by fatigue and observing pt's socks being to slide down his leg. Pt noted to have good safety awareness and no LOB at this time. he continues to benfit from skilled PT to address endurance and balance. Recommend home PT at d/c for improved functional gains.     Time Calculation:    PT Charges       Row Name 06/07/23 1213             Time Calculation    Start Time 1122  -ST      Stop Time 1148  -ST      Time Calculation (min) 26 min  -ST      PT Received On 06/07/23  -ST      PT - Next Appointment 06/08/23  -ST         Time Calculation- PT    Total Timed Code Minutes- PT 26 minute(s)  -ST         Timed Charges    53432 - Gait Training Minutes  10  -ST      96205 - PT Therapeutic Activity Minutes 16  -ST         Total Minutes    Timed Charges Total Minutes 26  -ST       Total Minutes 26  -ST                User Key  (r) = Recorded By, (t) = Taken By, (c) = Cosigned By      Initials Name Provider Type    ST Sahara Harrison PT Physical Therapist                  Therapy Charges for Today       Code Description Service Date Service Provider Modifiers Qty    28128212504 HC PT EVAL MOD COMPLEXITY 2 6/6/2023 Sahara Harrison, PT GP 1    23486629492 HC GAIT TRAINING EA 15 MIN 6/7/2023 Sahara Harrison, PT GP 1    10851830521 HC PT THERAPEUTIC ACT EA 15 MIN 6/7/2023 Sahara Harrison, PT GP 1            PT G-Codes  AM-PAC 6 Clicks Score (PT): 18  PT Discharge Summary  Anticipated Discharge Disposition (PT): home with assist,  home with home health    Sahara Harrison, PT  6/7/2023

## 2023-06-07 NOTE — PROGRESS NOTES
LOS: 1 day   Patient Care Team:  System, Provider Not In as PCP - General  Morris Cai MD as Consulting Physician (Sleep Medicine)  Michaela Hylton MD as Consulting Physician (Cardiology)  Hardy Banerjee MD as Consulting Physician (Ophthalmology)  Chula Christianson MD as Consulting Physician (Ophthalmology)  Jason Sherman MD (Endocrinology)  Perla Mayen MD as Consulting Physician (Nephrology)  Federico Hebert MD as Consulting Physician (Pulmonary Disease)  Niraj Ravi MD as Consulting Physician (Orthopedic Surgery)  Papa Velasco MD as Consulting Physician (Urology)  Terese Yost MD as Consulting Physician (Gastroenterology)  Aracelis Ball MD as Consulting Physician (Colon and Rectal Surgery)    Chief Complaint: Follow-up acute on chronic diastolic CHF, coronary artery disease, type II NSTEMI.    Interval History: Slowly diuresing and improving.  No chest pain.  Still with some shortness of breath.    Vital Signs:  Temp:  [97.9 °F (36.6 °C)-98.9 °F (37.2 °C)] 98.8 °F (37.1 °C)  Heart Rate:  [70-76] 75  Resp:  [18] 18  BP: (130-177)/(64-89) 152/70    Intake/Output Summary (Last 24 hours) at 6/7/2023 1452  Last data filed at 6/7/2023 1420  Gross per 24 hour   Intake 720 ml   Output 1670 ml   Net -950 ml       Physical Exam:   General Appearance:    No acute distress, alert and oriented x4   Lungs:     Decreased breath sounds at the bases bilaterally.    Heart:    Regular rhythm and normal rate.  No murmurs, gallops, or     rubs.   Abdomen:     Soft, nontender, nondistended.    Extremities:   1+ edema of the lower extremities (L>R)     Results Review:    Results from last 7 days   Lab Units 06/07/23  0406   SODIUM mmol/L 140   POTASSIUM mmol/L 4.1   CHLORIDE mmol/L 100   CO2 mmol/L 30.1*   BUN mg/dL 33*   CREATININE mg/dL 1.34*   GLUCOSE mg/dL 161*   CALCIUM mg/dL 9.4     Results from last 7 days   Lab Units 06/05/23  0023 06/04/23  2232   HSTROP T ng/L 43* 39*      Results from last 7 days   Lab Units 06/07/23  0406   WBC 10*3/mm3 8.98   HEMOGLOBIN g/dL 14.9   HEMATOCRIT % 45.1   PLATELETS 10*3/mm3 224                       I reviewed the patient's new clinical results.        Assessment:  1.  Acute on chronic diastolic CHF (EF 66%)  2.  Hypertension, uncontrolled  3.  Coronary artery disease, status post CABG in 2019  4.  Type II NSTEMI  5.  Stage IIIa chronic kidney disease  6.  Abnormal baseline EKG with diffuse ST changes (unchanged)  7.  Acute hypoxic respiratory failure  8.  Diabetes with neuropathy and retinopathy  9.  History of CVA  10.  Chronic lower back pain  11.  History of angioedema secondary to ACE inhibitors  12.  History of amlodipine induced edema  13.  Obstructive sleep apnea, intolerant to BiPAP    Plan:  -Doing better.  Switching to Lasix 40 mg p.o. twice daily per nephrology.  Left lower extremity always swells more than the right.  I suspect this is from venous insufficiency and prior significant hip surgery on the left side.    -Blood pressure is likely a driving factor for the congestive heart failure.  Increase hydralazine to 50 mg 3 times a day.  Again, he cannot take ACE inhibitors or ARB's because of a history of angioedema.  Similarly, he cannot take amlodipine secondary to previous edema.    -He is on oxygen at night only for his sleep apnea.  He cannot tolerate BiPAP.  This may also be contributing to the uncontrolled hypertension and edema.    -Continue aspirin.  No anginal symptoms.  He evidently does not take statins per his request.    Jeffrey Seymour MD  06/07/23  14:52 EDT

## 2023-06-07 NOTE — PROGRESS NOTES
Name: Dilip Verma ADMIT: 2023   : 1950  PCP: System, Provider Not In    MRN: [id number] LOS: 1 days   AGE/SEX: 73 y.o. male  ROOM: [facility]/     Subjective   Subjective      Edema in legs better. Less SOB. Now on 1 L NC. Overall feels better. No URI symptoms, no CP fever or chills       Objective   Objective   Vital Signs  Temp:  [97.9 °F (36.6 °C)-98.9 °F (37.2 °C)] 97.9 °F (36.6 °C)  Heart Rate:  [70-83] 70  Resp:  [18] 18  BP: (124-179)/(64-93) 177/89  SpO2:  [88 %-95 %] 94 %  on  Flow (L/min):  [1-2] 2;   Device (Oxygen Therapy): nasal cannula  Body mass index is 29.21 kg/m².  Physical Exam  Constitutional:       Appearance: He is obese. He is ill-appearing.   Cardiovascular:      Rate and Rhythm: Normal rate and regular rhythm.   Pulmonary:      Effort: No respiratory distress.      Comments: Decreased breath sounds     Abdominal:      General: Bowel sounds are normal. There is no distension.      Palpations: Abdomen is soft.      Tenderness: There is no abdominal tenderness.   Musculoskeletal:      Comments: Trace BLE edema   Neurological:      General: No focal deficit present.      Mental Status: He is oriented to person, place, and time. Mental status is at baseline.         Results Review     I reviewed the patient's new clinical results.  Results from last 7 days   Lab Units 23  0406 23  2232   WBC 10*3/mm3 8.98 10.68 10.68 8.52   HEMOGLOBIN g/dL 14.9 15.3 16.0 16.4   PLATELETS 10*3/mm3 224 231 230 209       Results from last 7 days   Lab Units 23  0406 23  0428 23  2232   SODIUM mmol/L 140 139 139 141   POTASSIUM mmol/L 4.1 4.2 4.3 4.2   CHLORIDE mmol/L 100 102 102 105   CO2 mmol/L 30.1* 30.0* 27.4 28.5   BUN mg/dL 33* 26* 29* 25*   CREATININE mg/dL 1.34* 1.26 1.31* 1.05   GLUCOSE mg/dL 161* 126* 366* 330*   EGFR mL/min/1.73 55.6* 59.8* 57.1* 74.5       Results from last 7 days   Lab Units 23  5672   ALBUMIN  g/dL 3.8   BILIRUBIN mg/dL 0.5   ALK PHOS U/L 71   AST (SGOT) U/L 14   ALT (SGPT) U/L 17       Results from last 7 days   Lab Units 06/07/23  0406 06/06/23  0428 06/05/23  0719 06/04/23  2232   CALCIUM mg/dL 9.4 9.3 9.5 8.7   ALBUMIN g/dL  --   --   --  3.8       Results from last 7 days   Lab Units 06/05/23  0023 06/04/23  2232   PROCALCITONIN ng/mL  --  0.06   LACTATE mmol/L 1.4  --        Glucose   Date/Time Value Ref Range Status   06/07/2023 0805 131 (H) 70 - 130 mg/dL Final     Comment:     Meter: [id number] : [id number] Juarez Angle    06/06/2023 2054 155 (H) 70 - 130 mg/dL Final     Comment:     Meter: [id number] : [id number] [facility]   06/06/2023 1719 99 70 - 130 mg/dL Final     Comment:     Meter: [id number] : [id number] Juarez Angle NA   06/06/2023 1211 211 (H) 70 - 130 mg/dL Final     Comment:     Meter: [id number] : [id number] Juarez Angle NA   06/06/2023 0759 118 70 - 130 mg/dL Final     Comment:     Meter: [id number] : [id number] Juarez Angle NA   06/05/2023 2102 112 70 - 130 mg/dL Final     Comment:     Meter: [id number] : [id number] Karel Clarkeu    06/05/2023 1712 298 (H) 70 - 130 mg/dL Final     Comment:     Meter: [id number] : [id number] Alex FERRARI       No radiology results for the last day  I have personally reviewed all medications:  Scheduled Medications  aspirin, 81 mg, Oral, Daily  baclofen, 10 mg, Oral, Q8H  cholecalciferol, 5,000 Units, Oral, Daily  enoxaparin, 40 mg, Subcutaneous, Q24H  furosemide, 40 mg, Intravenous, Q8H  hydrALAZINE, 25 mg, Oral, Q8H  insulin glargine, 27 Units, Subcutaneous, Daily  insulin lispro, 2-9 Units, Subcutaneous, 4x Daily AC & at Bedtime  latanoprost, 1 drop, Both Eyes, Nightly  lidocaine, 3 patch, Transdermal, Q24H  oxymetazoline, 2 spray, Each Nare, BID  senna-docusate sodium, 2 tablet, Oral, BID    Infusions  niCARdipine, 5-15 mg/hr, Last Rate: Stopped (06/05/23 1040)    Diet  Diet: Cardiac Diets; Healthy  Heart (2-3 Na+); Texture: Regular Texture (IDDSI 7); Fluid Consistency: Thin (IDDSI 0)    I have personally reviewed:  [x]  Laboratory   [x]  Microbiology   [x]  Radiology   [x]  EKG/Telemetry  [x]  Cardiology/Vascular   []  Pathology    []  Records    Documented weights    06/04/23 2114 06/05/23 1823 06/07/23 0526   Weight: 89.8 kg (198 lb) 91.8 kg (202 lb 6.4 oz) 89.7 kg (197 lb 12.8 oz)          Assessment/Plan     Active Hospital Problems    Diagnosis  POA    **Severe uncontrolled hypertension [I10]  Yes    Type 2 diabetes mellitus with diabetic neuropathy, with long-term current use of insulin [E11.40, Z79.4]  Not Applicable    Acute on chronic diastolic (congestive) heart failure [I50.33]  Yes    HTN (hypertension) [I10]  Yes    Hx of CABG [Z95.1]  Not Applicable    CAD (coronary artery disease) [I25.10]  Yes    Type 2 acute myocardial infarction [I21.A1]  Yes    Type 2 diabetes mellitus with hyperglycemia, with long-term current use of insulin [E11.65, Z79.4]  Not Applicable    Hyperlipidemia [E78.5]  Yes      Resolved Hospital Problems   No resolved problems to display.       73 y.o. male admitted with Severe uncontrolled hypertension.      Acute on chronic diastolic CHF  CKD3  Volume overload  Acute hypoxemia  Diuresis per nephrology. Lasix IV 40mg BID  TTE with LVEF 66% and grade 2 diastolic dysfunction.   Wean 02 as tolerated. Now on 2L NC. Not on home oxygen     Uncontrolled HTN  Not on home antihypertensive prior to arrival. Started on nifedipine and hydralazine. /64- 177/89. Intoleratant to ACEi (angioedema) and amlodipine (edema).   Check Orthostatic BP concerns for autonomic dysfunction     URI symptoms, resolved   RVP normal, CXR with PNA vs atelectasias but afebrile, procal with normal WBC. Hold on abx. Monitor     Type 2 MI/ CAD/ hx post op afib  No CP or afib. CABG in 2019.     DM2 with neuropathy and retinopathy  Glucose elevated.  Check A1c.  Avoid hypoglycemia.  [facility]     Chronic back pain  Lidoderm patches     History of CVA/tremors  Patient is appoint with neurology on 6/14.  Likely not far from his cognitive baseline at this time. 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  On oxygen at night only.    Lovenox 40 mg SC daily for DVT prophylaxis.  Full code.  Discussed with patient, nursing staff, and Dr Woods .  Anticipate discharge tmrw      LEIDY Taveras  [facility]  06/07/23  08:55 EDT

## 2023-06-07 NOTE — PLAN OF CARE
Goal Outcome Evaluation:      VSS. Pt ambulated to bathroom with staff. Changed IV lasix to PO. Pt given zofran 1x for nausea. Will continue to monitor.

## 2023-06-08 ENCOUNTER — READMISSION MANAGEMENT (OUTPATIENT)
Dept: CALL CENTER | Facility: HOSPITAL | Age: 73
End: 2023-06-08
Payer: MEDICARE

## 2023-06-08 VITALS
WEIGHT: 198 LBS | RESPIRATION RATE: 18 BRPM | HEIGHT: 69 IN | OXYGEN SATURATION: 93 % | BODY MASS INDEX: 29.33 KG/M2 | TEMPERATURE: 98.5 F | SYSTOLIC BLOOD PRESSURE: 146 MMHG | DIASTOLIC BLOOD PRESSURE: 76 MMHG | HEART RATE: 74 BPM

## 2023-06-08 PROBLEM — I10 SEVERE UNCONTROLLED HYPERTENSION: Status: RESOLVED | Noted: 2023-06-05 | Resolved: 2023-06-08

## 2023-06-08 LAB
ANION GAP SERPL CALCULATED.3IONS-SCNC: 10.3 MMOL/L (ref 5–15)
BASOPHILS # BLD AUTO: 0.03 10*3/MM3 (ref 0–0.2)
BASOPHILS NFR BLD AUTO: 0.3 % (ref 0–1.5)
BUN SERPL-MCNC: 33 MG/DL (ref 8–23)
BUN/CREAT SERPL: 21.3 (ref 7–25)
CALCIUM SPEC-SCNC: 9.3 MG/DL (ref 8.6–10.5)
CHLORIDE SERPL-SCNC: 101 MMOL/L (ref 98–107)
CO2 SERPL-SCNC: 29.7 MMOL/L (ref 22–29)
CREAT SERPL-MCNC: 1.55 MG/DL (ref 0.76–1.27)
DEPRECATED RDW RBC AUTO: 43.7 FL (ref 37–54)
EGFRCR SERPLBLD CKD-EPI 2021: 46.7 ML/MIN/1.73
EOSINOPHIL # BLD AUTO: 0.19 10*3/MM3 (ref 0–0.4)
EOSINOPHIL NFR BLD AUTO: 2 % (ref 0.3–6.2)
ERYTHROCYTE [DISTWIDTH] IN BLOOD BY AUTOMATED COUNT: 13 % (ref 12.3–15.4)
GLUCOSE BLDC GLUCOMTR-MCNC: 118 MG/DL (ref 70–130)
GLUCOSE SERPL-MCNC: 198 MG/DL (ref 65–99)
HCT VFR BLD AUTO: 45 % (ref 37.5–51)
HGB BLD-MCNC: 15.4 G/DL (ref 13–17.7)
IMM GRANULOCYTES # BLD AUTO: 0.03 10*3/MM3 (ref 0–0.05)
IMM GRANULOCYTES NFR BLD AUTO: 0.3 % (ref 0–0.5)
LYMPHOCYTES # BLD AUTO: 1.66 10*3/MM3 (ref 0.7–3.1)
LYMPHOCYTES NFR BLD AUTO: 17.3 % (ref 19.6–45.3)
MCH RBC QN AUTO: 31.4 PG (ref 26.6–33)
MCHC RBC AUTO-ENTMCNC: 34.2 G/DL (ref 31.5–35.7)
MCV RBC AUTO: 91.8 FL (ref 79–97)
MONOCYTES # BLD AUTO: 0.78 10*3/MM3 (ref 0.1–0.9)
MONOCYTES NFR BLD AUTO: 8.1 % (ref 5–12)
NEUTROPHILS NFR BLD AUTO: 6.92 10*3/MM3 (ref 1.7–7)
NEUTROPHILS NFR BLD AUTO: 72 % (ref 42.7–76)
NRBC BLD AUTO-RTO: 0 /100 WBC (ref 0–0.2)
PLATELET # BLD AUTO: 204 10*3/MM3 (ref 140–450)
PMV BLD AUTO: 9.9 FL (ref 6–12)
POTASSIUM SERPL-SCNC: 4.3 MMOL/L (ref 3.5–5.2)
RBC # BLD AUTO: 4.9 10*6/MM3 (ref 4.14–5.8)
SODIUM SERPL-SCNC: 141 MMOL/L (ref 136–145)
WBC NRBC COR # BLD: 9.61 10*3/MM3 (ref 3.4–10.8)

## 2023-06-08 PROCEDURE — 80048 BASIC METABOLIC PNL TOTAL CA: CPT | Performed by: INTERNAL MEDICINE

## 2023-06-08 PROCEDURE — 99232 SBSQ HOSP IP/OBS MODERATE 35: CPT | Performed by: INTERNAL MEDICINE

## 2023-06-08 PROCEDURE — 82948 REAGENT STRIP/BLOOD GLUCOSE: CPT

## 2023-06-08 PROCEDURE — 85025 COMPLETE CBC W/AUTO DIFF WBC: CPT | Performed by: INTERNAL MEDICINE

## 2023-06-08 RX ORDER — FUROSEMIDE 40 MG/1
40 TABLET ORAL 2 TIMES DAILY
Qty: 30 TABLET | Refills: 0 | Status: SHIPPED | OUTPATIENT
Start: 2023-06-08

## 2023-06-08 RX ORDER — HYDRALAZINE HYDROCHLORIDE 50 MG/1
50 TABLET, FILM COATED ORAL EVERY 8 HOURS SCHEDULED
Qty: 90 TABLET | Refills: 0 | Status: SHIPPED | OUTPATIENT
Start: 2023-06-08 | End: 2023-07-08

## 2023-06-08 RX ADMIN — Medication 10 ML: at 09:52

## 2023-06-08 RX ADMIN — Medication 5000 UNITS: at 09:52

## 2023-06-08 RX ADMIN — TRAMADOL HYDROCHLORIDE 50 MG: 50 TABLET, COATED ORAL at 00:38

## 2023-06-08 RX ADMIN — FUROSEMIDE 40 MG: 40 TABLET ORAL at 09:52

## 2023-06-08 RX ADMIN — LIDOCAINE 3 PATCH: 50 PATCH CUTANEOUS at 09:53

## 2023-06-08 RX ADMIN — ASPIRIN 81 MG: 81 TABLET, CHEWABLE ORAL at 09:52

## 2023-06-08 RX ADMIN — OXYMETAZOLINE HYDROCHLORIDE 2 SPRAY: 0.05 SPRAY NASAL at 09:52

## 2023-06-08 RX ADMIN — INSULIN GLARGINE-YFGN 27 UNITS: 100 INJECTION, SOLUTION SUBCUTANEOUS at 09:52

## 2023-06-08 RX ADMIN — HYDRALAZINE HYDROCHLORIDE 50 MG: 50 TABLET, FILM COATED ORAL at 06:09

## 2023-06-08 NOTE — CASE MANAGEMENT/SOCIAL WORK
Case Management Discharge Note      Final Note: Home with spouse and [facility]. Has nocturnal O2 through [employer]. Family transport. Pt's PCP is through [facility] in home MD Visits.    Provided Post Acute Provider List?: Refused  N/A Provider List Comment: Requests [facility] as they have used in the past  Provided Post Acute Provider Quality & Resource List?: Refused    Selected Continued Care - Discharged on 6/8/2023 Admission date: 6/4/2023 - Discharge disposition: Home-Health Care Svc      Destination    No services have been selected for the patient.                Durable Medical Equipment    No services have been selected for the patient.                Dialysis/Infusion    No services have been selected for the patient.                Home Medical Care Coordination complete.      Service Provider Selected Services Address Phone Fax Patient Preferred    [facility] [address], [address], [place] [address] [phone] --              Therapy    No services have been selected for the patient.                Community Resources    No services have been selected for the patient.                Community & DME    No services have been selected for the patient.                    Transportation Services  Private: Car    Final Discharge Disposition Code: 06 - home with home health care

## 2023-06-08 NOTE — PROGRESS NOTES
LOS: 2 days   Patient Care Team:  Morris Cai MD as Consulting Physician (Sleep Medicine)  Michaela Hylton MD as Consulting Physician (Cardiology)  Hardy Banerjee MD as Consulting Physician (Ophthalmology)  Chula Christianson MD as Consulting Physician (Ophthalmology)  Jason Sherman MD (Endocrinology)  Perla Mayen MD as Consulting Physician (Nephrology)  Federico Hebert MD as Consulting Physician (Pulmonary Disease)  Niraj Ravi MD as Consulting Physician (Orthopedic Surgery)  Papa Velasco MD as Consulting Physician (Urology)  Terese Yost MD as Consulting Physician (Gastroenterology)  Aracelis Ball MD as Consulting Physician (Colon and Rectal Surgery)  [facility] as Primary Care Provider    Chief Complaint: Follow-up acute on chronic diastolic CHF, coronary artery disease, type II NSTEMI.    Interval History: Volume status is much improved.  No chest pain.  Shortness of breath is at baseline.    Vital Signs:  Temp:  [97.8 °F (36.6 °C)-98.5 °F (36.9 °C)] 98.5 °F (36.9 °C)  Heart Rate:  [74-83] 74  Resp:  [18] 18  BP: (138-161)/(76-87) 146/76    Intake/Output Summary (Last 24 hours) at 6/8/2023 1508  Last data filed at 6/8/2023 0726  Gross per 24 hour   Intake 0 ml   Output 600 ml   Net -600 ml       Physical Exam:   General Appearance:    No acute distress, alert and oriented x4   Lungs:     Decreased breath sounds at the bases bilaterally.    Heart:    Regular rhythm and normal rate.  No murmurs, gallops, or     rubs.   Abdomen:     Soft, nontender, nondistended.    Extremities:   Trace to 1+ edema of the lower extremities (L>R)     Results Review:    Results from last 7 days   Lab Units 06/08/23  0401   SODIUM mmol/L 141   POTASSIUM mmol/L 4.3   CHLORIDE mmol/L 101   CO2 mmol/L 29.7*   BUN mg/dL 33*   CREATININE mg/dL 1.55*   GLUCOSE mg/dL 198*   CALCIUM mg/dL 9.3     Results from last 7 days   Lab Units 06/05/23  0023 06/04/23  2232   HSTROP T ng/L  43* 39*     Results from last 7 days   Lab Units 06/08/23  0401   WBC 10*3/mm3 9.61   HEMOGLOBIN g/dL 15.4   HEMATOCRIT % 45.0   PLATELETS 10*3/mm3 204                       I reviewed the patient's new clinical results.        Assessment:  1.  Acute on chronic diastolic CHF (EF 66%)  2.  Hypertension, uncontrolled  3.  Coronary artery disease, status post CABG in 2019  4.  Type II NSTEMI  5.  Stage IIIa chronic kidney disease  6.  Abnormal baseline EKG with diffuse ST changes (unchanged)  7.  Acute hypoxic respiratory failure  8.  Diabetes with neuropathy and retinopathy  9.  History of CVA  10.  Chronic lower back pain  11.  History of angioedema secondary to ACE inhibitors  12.  History of amlodipine induced edema  13.  Obstructive sleep apnea, intolerant to BiPAP    Plan:  -Continue Lasix 40 mg p.o. twice daily per nephrology.  Left lower extremity always swells more than the right.  I suspect this is from venous insufficiency and prior significant hip surgery on the left side.    -Blood pressure is likely a driving factor for the congestive heart failure.  Continue hydralazine to 50 mg 3 times a day.  Again, he cannot take ACE inhibitors or ARB's because of a history of angioedema.  Similarly, he cannot take amlodipine secondary to previous edema.    -He is on oxygen at night only for his sleep apnea.  He cannot tolerate BiPAP.  This may also be contributing to the uncontrolled hypertension and edema.    -Continue aspirin.  No anginal symptoms.  He evidently does not take statins per his choice.    -Stable for discharge from a cardiac perspective.  We will arrange for follow-up.    Jeffrey Seymour MD  06/08/23  15:08 EDT

## 2023-06-08 NOTE — PROGRESS NOTES
"      FOLLOW UP NOTE      Name: Dilip Verma ADMIT: 2023   : 1950  PCP: System, Provider Not In    MRN: [id number] LOS: 2 days   AGE/SEX: 73 y.o. male  ROOM: [facility]/     Date of Service: 2023                           CHIEF COMPLIANTS / REASON FOR FOLLOW UP          Jose Angel,  ckd 3      Subjective:        Patient reports swelling is much better, ambulating  Urine output 1.3 L yesterday, not documented today       Review of Systems:         Constitutional: No fever, no chills, no lethargy, no weakness.  HEENT:  No headache, otalgia, itchy eyes, nasal discharge or sore throat.  Cardiac:  No chest pain, dyspnea, orthopnea or PND.  Chest:  No cough, phlegm or wheezing.  Abdomen:  No abdominal pain, nausea or vomiting.  Neuro:  No focal weakness, abnormal movements or seizure-like activity.  :   No hematuria, no pyuria, no dysuria, no flank pain.  Extremities:  No  joint pains.  ROS was otherwise negative except as mentioned in the [place].        OBJECTIVE                                                                        Exam:  /80   Pulse 80   Temp 98.4 °F (36.9 °C) (Oral)   Resp 18   Ht 175.3 cm (69\")   Wt 89.8 kg (198 lb)   SpO2 94%   BMI 29.24 kg/m²   Intake/Output last 3 shifts:  I/O last 3 completed shifts:  In: 720 [P.O.:720]  Out: 1700 [Urine:1700]  Intake/Output this shift:  No intake/output data recorded.    General Appearance:  Alert, cooperative, no distress, appears stated age  Head:  Normocephalic, without obvious abnormality, atraumatic  Eyes:  Sclerae anicteric, EOM's intact     Neck: No JVD  Lungs:   Clear to auscultation bilaterally, respirations unlabored  Heart:  Regular rate and rhythm, S1 and S2 normal, no  rub   or gallop  Abdomen:  Soft, nontender,  Extremities:  Extremities normal, 1+ edema i improved  Neurologic:   Alert and oriented, no focal deficits    Scheduled Meds:aspirin, 81 mg, Oral, Daily  baclofen, 10 mg, Oral, Q8H  cholecalciferol, 5,000 Units, Oral, " Daily  enoxaparin, 40 mg, Subcutaneous, Q24H  furosemide, 40 mg, Oral, BID  hydrALAZINE, 50 mg, Oral, Q8H  insulin glargine, 27 Units, Subcutaneous, Daily  insulin lispro, 2-9 Units, Subcutaneous, 4x Daily AC & at Bedtime  latanoprost, 1 drop, Both Eyes, Nightly  lidocaine, 3 patch, Transdermal, Q24H  oxymetazoline, 2 spray, Each Nare, BID  senna-docusate sodium, 2 tablet, Oral, BID      Continuous Infusions:niCARdipine, 5-15 mg/hr, Last Rate: Stopped (06/05/23 1040)      PRN Meds:  acetaminophen    aluminum-magnesium hydroxide-simethicone    senna-docusate sodium **AND** polyethylene glycol **AND** bisacodyl **AND** bisacodyl    dextrose    dextrose    glucagon (human recombinant)    melatonin    nitroglycerin    ondansetron **OR** ondansetron    sodium chloride    traMADol         Data Review:                                                                           Labs reviewed        Imaging:                                                                           Radiology reviewed           ASSESSMENT:                                                                                Severe uncontrolled hypertension    Type 2 diabetes mellitus with hyperglycemia, with long-term current use of insulin    Hyperlipidemia    Type 2 acute myocardial infarction    CAD (coronary artery disease)    Hx of CABG    HTN (hypertension)    Acute on chronic diastolic (congestive) heart failure    Type 2 diabetes mellitus with diabetic neuropathy, with long-term current use of insulin       Chronic kidney disease stage III  Fluid overload  Uncontrolled hypertension  Heart failure with preserved ejection fraction  Coronary artery disease  Glycosuria      PLAN:                                                                            Fluid overload in a patient with underlying chronic kidney disease stage III, with significant weight gain of nearly 20 pounds and orthopnea with uncontrolled hypertension.  Volume overload improved  with diuretics, weight has come down, mild contraction alkalosis.  Continue p.o. Lasix 40 mg twice daily  Recommend 2 grams sodium diet  HTN better controlled with diuretics and starting hydralazine: continue   2 g sodium restricted diet  Daily standing weights  Last TTE with LVEF 66% and grade 2 diastolic dysfunction.  No proteinuria, glycosuria probably due to hyperglycemia.  Angioedema with ACE inhibitors and swelling with amlodipine, avoid both of the above.  Some degree of edema is chronic    Will require close outpatient follow-up      Cristóbal Mayen MD  [facility]  6/8/2023  07:25 EDT

## 2023-06-08 NOTE — DISCHARGE SUMMARY
Patient Name: Dilip Verma  : 1950  MRN: [id number]    Date of Admission: 2023  Date of Discharge:  2023  Primary Care Physician: No primary care provider on file.      Chief Complaint:   Hypertension      Discharge Diagnoses     Active Hospital Problems    Diagnosis  POA    Type 2 diabetes mellitus with diabetic neuropathy, with long-term current use of insulin [E11.40, Z79.4]  Not Applicable    Acute on chronic diastolic (congestive) heart failure [I50.33]  Yes    HTN (hypertension) [I10]  Yes    Hx of CABG [Z95.1]  Not Applicable    CAD (coronary artery disease) [I25.10]  Yes    Type 2 acute myocardial infarction [I21.A1]  Yes    Type 2 diabetes mellitus with hyperglycemia, with long-term current use of insulin [E11.65, Z79.4]  Not Applicable    Hyperlipidemia [E78.5]  Yes      Resolved Hospital Problems    Diagnosis Date Resolved POA    Severe uncontrolled hypertension [I10] 2023 Yes        Hospital Course     Mr. Verma is a 73 y.o. male with a history of chronic diastolic CHF, HTN, CAD, CKD 3A, diabetes with neuropathy and retinopathy, history of CVA that presented with acute on chronic diastolic CHF and uncontrolled hypertension.  He has had nearly 20 pounds of weight gain and orthopnea secondary to volume overload.  Neurology and nephrology have been assisting this admission.  Started on IV diuresis with Lasix IV 40 mg twice daily.  His echocardiogram demonstrated grade 2 diastolic dysfunction with an LVEF of 66%.  Was on oxygen on admission but this has been weaned to room air diuresis.  He is not on home antihypertensive agents prior to admission.  He has been started on nifedipine and hydralazine with improvement in his blood pressure.  He is intolerant to ACE inhibitors due to angioedema and intolerant to amlodipine due to edema.  His lower extremity edema significantly improved and he is without shortness of breath.  Nephrology and cardiology appropriate discharge today to  home.  He will need to follow-up with both specialist in the office.      Day of Discharge     Subjective:   Denies SOB or CP. BLE edema improved.     Physical Exam:  Temp:  [97.8 °F (36.6 °C)-98.8 °F (37.1 °C)] 98.5 °F (36.9 °C)  Heart Rate:  [74-83] 74  Resp:  [18] 18  BP: (138-161)/(70-87) 146/76  Body mass index is 29.24 kg/m².  Physical Exam  Vitals reviewed.   Constitutional:       Appearance: He is well-developed. He is obese.   HENT:      Head: Normocephalic and atraumatic.      Mouth/Throat:      Mouth: Mucous membranes are moist.   Cardiovascular:      Rate and Rhythm: Normal rate and regular rhythm.   Pulmonary:      Effort: Pulmonary effort is normal. No respiratory distress.      Breath sounds: Normal breath sounds.   Abdominal:      General: Bowel sounds are normal. There is no distension.      Palpations: Abdomen is soft.      Tenderness: There is no abdominal tenderness.   Musculoskeletal:      Comments: Trace BLE edema   Skin:     General: Skin is warm and dry.   Neurological:      General: No focal deficit present.      Mental Status: He is alert and oriented to person, place, and time.   Psychiatric:         Mood and Affect: Mood normal.         Behavior: Behavior normal.         Thought Content: Thought content normal.       Consultants     Consult Orders (all) (From admission, onward)       Start     Ordered    06/05/23 1020  Inpatient Nephrology Consult  Once        Specialty:  Nephrology  Provider:  Cristóbal Mayen MD    06/05/23 1020    06/05/23 0831  Inpatient Cardiology Consult  Once        Specialty:  Cardiology  Provider:  Jeffrey Seymour MD    06/05/23 0830    06/05/23 0002  [facility] (on-call MD unless specified) Details  Once        Specialty:  Hospitalist  Provider:  (Not yet assigned)    06/05/23 0002                  Procedures     * Surgery not found *      Imaging Results (All)       Procedure Component Value Units Date/Time    XR Chest 1 View [[id number]] Collected: 06/04/23 1303      Updated: 06/04/23 2242    Narrative:      XR CHEST 1 VW-  06/04/2023     HISTORY: Cough.     Heart size is within normal limits. There is mild to moderate patchy  area of atelectasis or developing pneumonia in the left lung base  similar which has a linear component. There are some minimal atelectasis  or developing infiltrate in the right lung base. Sternotomy wires are  seen.       Impression:      1. Bibasilar atelectasis and/or developing pneumonia left greater than  right.     This report was finalized on 6/4/2023 10:39 PM by Dr. Tad Godfrey M.D.             Results for orders placed during the hospital encounter of 05/18/22    Doppler arterial multi level lower extremity bilateral CAR    Interpretation Summary  · Right Conclusion: The right AMINTA is normal. Mild digital ischemia.  · Left Conclusion: The left AMINTA is normal. Mild digital ischemia.    Results for orders placed during the hospital encounter of 10/05/22    Adult Transthoracic Echo Complete W/ Cont if Necessary Per Protocol    Interpretation Summary  · Calculated left ventricular EF = 66.1% Estimated left ventricular EF was in agreement with the calculated left ventricular EF. Left ventricular systolic function is normal.Left ventricular wall thickness is consistent with mild concentric hypertrophy.Abnormal global longitudinal LV strain (GLS) = -12.3%.  · Left ventricular diastolic function is consistent with (grade II w/high LAP) pseudonormalization.Elevated left atrial pressure.  · The aortic valve exhibits sclerosis. There is restricted mobility of the right coronary cusp. There is no significant stenosis or regurgitation noted.    Pertinent Labs     Results from last 7 days   Lab Units 06/08/23  0401 06/07/23  0406 06/06/23  0428 06/05/23  0719   WBC 10*3/mm3 9.61 8.98 10.68 10.68   HEMOGLOBIN g/dL 15.4 14.9 15.3 16.0   PLATELETS 10*3/mm3 204 224 231 230     Results from last 7 days   Lab Units 06/08/23  0401 06/07/23  0406  06/06/23  0428 06/05/23  0719   SODIUM mmol/L 141 140 139 139   POTASSIUM mmol/L 4.3 4.1 4.2 4.3   CHLORIDE mmol/L 101 100 102 102   CO2 mmol/L 29.7* 30.1* 30.0* 27.4   BUN mg/dL 33* 33* 26* 29*   CREATININE mg/dL 1.55* 1.34* 1.26 1.31*   GLUCOSE mg/dL 198* 161* 126* 366*   EGFR mL/min/1.73 46.7* 55.6* 59.8* 57.1*     Results from last 7 days   Lab Units 06/04/23  2232   ALBUMIN g/dL 3.8   BILIRUBIN mg/dL 0.5   ALK PHOS U/L 71   AST (SGOT) U/L 14   ALT (SGPT) U/L 17     Results from last 7 days   Lab Units 06/08/23  0401 06/07/23  0406 06/06/23  0428 06/05/23  0719 06/04/23  2232   CALCIUM mg/dL 9.3 9.4 9.3 9.5 8.7   ALBUMIN g/dL  --   --   --   --  3.8       Results from last 7 days   Lab Units 06/05/23  0023 06/04/23  2232   HSTROP T ng/L 43* 39*   PROBNP pg/mL  --  1,000.0*           Invalid input(s): LDLCALC      Results from last 7 days   Lab Units 06/04/23 2233   COVID19  Not Detected       Test Results Pending at Discharge     Pending Labs       Order Current Status    Metanephrines, Frac. Free, Plasma In process            Discharge Details        Discharge Medications        New Medications        Instructions Start Date   furosemide 40 MG tablet  Commonly known as: LASIX   40 mg, Oral, 2 Times Daily      hydrALAZINE 50 MG tablet  Commonly known as: APRESOLINE   50 mg, Oral, Every 8 Hours Scheduled             Continue These Medications        Instructions Start Date   aspirin 81 MG EC tablet   81 mg, Oral, Daily, HOLD PRIOR TO SURGERY PER MD INSTRUCTIONS      baclofen 10 MG tablet  Commonly known as: LIORESAL   1 tablet, Oral, 4 Times Daily PRN      Docusate Sodium 100 MG capsule   Daily      Insulin Glargine (2 Unit Dial) 300 UNIT/ML solution pen-injector injection  Commonly known as: TOUJEO   34 Units, Subcutaneous, Daily      latanoprost 0.005 % ophthalmic solution  Commonly known as: XALATAN   1 drop, Both Eyes, Every Night at Bedtime      MAGNESIUM PO   Oral      mometasone 0.1 % ointment  Commonly  known as: ELOCON   1 application, Topical, Daily, Do not exceed 1 week, needs to be off 1 week and can restart      NYQUIL PO   Oral      sildenafil 20 MG tablet  Commonly known as: REVATIO   TAKE 1-3 TABLETS 1 HR PRIOR TO SEXUAL ACTIVITY      testosterone 25 MG/2.5GM (1%) gel gel  Commonly known as: ANDROGEL   Transdermal, Daily      traMADol 50 MG tablet  Commonly known as: ULTRAM   50 mg, Oral, Every 8 Hours PRN      Unable to find   1 each, Once, Cbd pain stick      Unable to find   1 each, Once, Hemp gummies      Unable to find   1 each, Once, Relaxium, pt takes one per night  OTC       vitamin D3 125 MCG (5000 UT) capsule capsule   5,000 Units, Oral, Daily             Stop These Medications      bumetanide 1 MG tablet  Commonly known as: BUMEX            ASK your doctor about these medications        Instructions Start Date   insulin lispro 100 UNIT/ML injection  Commonly known as: humaLOG   0-14 Units, Subcutaneous, 3 Times Daily Before Meals               Allergies   Allergen Reactions    Ace Inhibitors Angioedema    Losartan Angioedema     Angioneurotic edema    Seroquel [Quetiapine] Hallucinations    Xanax [Alprazolam] Unknown - High Severity    Amlodipine Swelling    Ativan [Lorazepam] Hallucinations    Baclofen Hallucinations    Minoxidil Confusion    Tetracycline Rash     bliisters in mouth         Discharge Disposition:  Home-Health Care Svc      Discharge Diet:  Diet Order   Procedures    Diet: Cardiac Diets; Healthy Heart (2-3 Na+); Texture: Regular Texture (IDDSI 7); Fluid Consistency: Thin (IDDSI 0)       Discharge Activity:   Activity Instructions       Activity as Tolerated              CODE STATUS:    Code Status and Medical Interventions:   Ordered at: 06/05/23 0054     Code Status (Patient has no pulse and is not breathing):    CPR (Attempt to Resuscitate)     Medical Interventions (Patient has pulse or is breathing):    Full Support       Future Appointments   Date Time Provider Department  Center   6/14/2023  2:00 PM Jose Le MD [facility]   9/25/2023  1:30 PM Danii Dennison APRN [facility]      Contact information for follow-up providers       System, Provider Not In .    Contact information:  [facility] [id number]                       Contact information for after-discharge care       Home Medical Care       [facility]    Service: [facility] Health Services  Contact information:  [address], [address]  [facility] [address] [phone]                                   Time Spent on Discharge:  Greater than 55 minutes      LEIDY Taveras  [facility]  06/08/23  10:44 EDT

## 2023-06-09 LAB
METANEPH FREE SERPL-MCNC: 39.8 PG/ML (ref 0–88)
NORMETANEPHRINE SERPL-MCNC: 56.9 PG/ML (ref 0–285.2)

## 2023-06-09 NOTE — OUTREACH NOTE
Prep Survey      Flowsheet Row Responses   [facility] facility patient discharged from? [facility]   Is LACE score < 7 ? No   Eligibility Readm Mgmt   Discharge diagnosis Acute on chronic diastolic heart failure   Does the patient have one of the following disease processes/diagnoses(primary or secondary)? CHF   Does the patient have Home health ordered? Yes   What is the Home health agency?  [facility]   Is there a DME ordered? Yes   What DME was ordered? s nocturnal O2 through [facility]   Prep survey completed? Yes            HANK ANDERSON - Registered Nurse

## 2023-06-13 ENCOUNTER — READMISSION MANAGEMENT (OUTPATIENT)
Dept: CALL CENTER | Facility: HOSPITAL | Age: 73
End: 2023-06-13
Payer: MEDICARE

## 2023-06-13 NOTE — OUTREACH NOTE
CHF Week 1 Survey      Flowsheet Row Responses   [facility] discharged from? [place]   Does the patient have one of the following disease processes/diagnoses(primary or secondary)? CHF   CHF Week 1 attempt successful? Yes   Call start time 1429   Call end time 1436   Discharge diagnosis Acute on chronic diastolic heart failure   Is patient permission given to speak with other caregiver? Yes   List who call center can speak with Bertin Verman Spouse   Person spoke with today (if not patient) and relationship Bertin Verman Spouse   Meds reviewed with patient/caregiver? Yes   Does the patient have all medications ordered at discharge? Yes   Is the patient taking all medications as directed (includes completed medication regime)? Yes   Comments regarding appointments Cardiology appt 7/7   Comments regarding PCP Dr Taylor. Wife reports that patient had hour long telehealth appt with Dr Taylor.   Has the patient kept scheduled appointments due by today? Yes   Comments Neurology appt tomorrow   What is the Home health agency?  [facility]    Has home health visited the patient within 72 hours of discharge? Yes   DME comments Wearing Home O22L for sleep   Psychosocial issues? No   Did the patient receive a copy of their discharge instructions? Yes   Nursing interventions Reviewed instructions with patient  [spouse]   What is the patient's perception of their health status since discharge? Worsening  [Wife reports that patient has already gained around 4 pounds since discharge. She reports that  nurse was contacting physician for orders. Awaiting to hear back.]   Nursing interventions Nurse provided patient education, Advised patient to call provider   If the patient is a current smoker, are they able to teach back resources for cessation? Not a smoker   Is the patient/caregiver able to teach back the hierarchy of who to call/visit for symptoms/problems? PCP, Specialist, Home health nurse, Urgent Care, ED, 911  Yes   CHF Zone this Call Yellow Zone   Yellow Zone Sudden weight gain of more than 2-3 lbs in a 24 hour period (or 5 lbs in a week)   Yellow Zone Interventions Consider contacting your doctor or health care team, Consider asking your health care team about a change in medications    CHF Week 1 call completed? Yes   Is the patient interested in additional calls from an ambulatory ?  NOTE:  applies to high risk patients requiring additional follow-up. No   Would this patient benefit from a Referral to [facility] Social Work? No   Call end time [phone]            HANK GONZALEZ - Registered Nurse

## 2023-07-20 PROBLEM — M48.02 CERVICAL SPINAL STENOSIS: Status: ACTIVE | Noted: 2023-07-20

## 2023-10-03 ENCOUNTER — HOSPITAL ENCOUNTER (OUTPATIENT)
Facility: HOSPITAL | Age: 73
Setting detail: OBSERVATION
Discharge: HOME HEALTH | End: 2023-10-07
Attending: EMERGENCY MEDICINE | Admitting: INTERNAL MEDICINE
Payer: MEDICARE

## 2023-10-03 DIAGNOSIS — L02.31 GLUTEAL ABSCESS: ICD-10-CM

## 2023-10-03 DIAGNOSIS — R33.8 ACUTE URINARY RETENTION: Primary | ICD-10-CM

## 2023-10-03 DIAGNOSIS — M48.00 SPINAL STENOSIS, UNSPECIFIED SPINAL REGION: ICD-10-CM

## 2023-10-03 PROBLEM — Z91.199 MEDICALLY NONCOMPLIANT: Chronic | Status: ACTIVE | Noted: 2023-10-03

## 2023-10-03 PROBLEM — L03.90 CELLULITIS: Status: ACTIVE | Noted: 2023-10-03

## 2023-10-03 PROBLEM — E66.3 OVERWEIGHT: Status: ACTIVE | Noted: 2023-09-06

## 2023-10-03 PROBLEM — I50.32 CHRONIC DIASTOLIC HEART FAILURE: Chronic | Status: ACTIVE | Noted: 2019-09-11

## 2023-10-03 PROBLEM — M48.02 CERVICAL STENOSIS OF SPINE: Status: ACTIVE | Noted: 2023-10-03

## 2023-10-03 PROBLEM — M48.061 SPINAL STENOSIS, LUMBAR REGION, WITHOUT NEUROGENIC CLAUDICATION: Status: ACTIVE | Noted: 2023-10-03

## 2023-10-03 PROBLEM — L02.91 ABSCESS OF SKIN: Status: ACTIVE | Noted: 2023-10-01

## 2023-10-03 LAB
ALBUMIN SERPL-MCNC: 3.2 G/DL (ref 3.5–5.2)
ALBUMIN/GLOB SERPL: 0.8 G/DL
ALP SERPL-CCNC: 73 U/L (ref 39–117)
ALT SERPL W P-5'-P-CCNC: 23 U/L (ref 1–41)
ANION GAP SERPL CALCULATED.3IONS-SCNC: 14.5 MMOL/L (ref 5–15)
APTT PPP: 29.6 SECONDS (ref 22.7–35.4)
AST SERPL-CCNC: 11 U/L (ref 1–40)
BASOPHILS # BLD AUTO: 0.04 10*3/MM3 (ref 0–0.2)
BASOPHILS NFR BLD AUTO: 0.3 % (ref 0–1.5)
BILIRUB SERPL-MCNC: 0.6 MG/DL (ref 0–1.2)
BILIRUB UR QL STRIP: NEGATIVE
BUN SERPL-MCNC: 12 MG/DL (ref 8–23)
BUN/CREAT SERPL: 12.2 (ref 7–25)
CALCIUM SPEC-SCNC: 9.3 MG/DL (ref 8.6–10.5)
CHLORIDE SERPL-SCNC: 92 MMOL/L (ref 98–107)
CK SERPL-CCNC: 71 U/L (ref 20–200)
CLARITY UR: CLEAR
CO2 SERPL-SCNC: 28.5 MMOL/L (ref 22–29)
COLOR UR: YELLOW
CREAT SERPL-MCNC: 0.98 MG/DL (ref 0.76–1.27)
D-LACTATE SERPL-SCNC: 1.6 MMOL/L (ref 0.5–2)
DEPRECATED RDW RBC AUTO: 41.7 FL (ref 37–54)
EGFRCR SERPLBLD CKD-EPI 2021: 80.9 ML/MIN/1.73
EOSINOPHIL # BLD AUTO: 0.05 10*3/MM3 (ref 0–0.4)
EOSINOPHIL NFR BLD AUTO: 0.4 % (ref 0.3–6.2)
ERYTHROCYTE [DISTWIDTH] IN BLOOD BY AUTOMATED COUNT: 12.2 % (ref 12.3–15.4)
GLOBULIN UR ELPH-MCNC: 4 GM/DL
GLUCOSE BLDC GLUCOMTR-MCNC: 266 MG/DL (ref 70–130)
GLUCOSE BLDC GLUCOMTR-MCNC: 267 MG/DL (ref 70–130)
GLUCOSE BLDC GLUCOMTR-MCNC: 270 MG/DL (ref 70–130)
GLUCOSE SERPL-MCNC: 263 MG/DL (ref 65–99)
GLUCOSE UR STRIP-MCNC: ABNORMAL MG/DL
HBA1C MFR BLD: 8.9 % (ref 4.8–5.6)
HCT VFR BLD AUTO: 48.2 % (ref 37.5–51)
HGB BLD-MCNC: 16.2 G/DL (ref 13–17.7)
HGB UR QL STRIP.AUTO: NEGATIVE
IMM GRANULOCYTES # BLD AUTO: 0.11 10*3/MM3 (ref 0–0.05)
IMM GRANULOCYTES NFR BLD AUTO: 0.9 % (ref 0–0.5)
INR PPP: 1.13 (ref 0.9–1.1)
KETONES UR QL STRIP: ABNORMAL
LEUKOCYTE ESTERASE UR QL STRIP.AUTO: NEGATIVE
LYMPHOCYTES # BLD AUTO: 0.74 10*3/MM3 (ref 0.7–3.1)
LYMPHOCYTES NFR BLD AUTO: 6 % (ref 19.6–45.3)
MAGNESIUM SERPL-MCNC: 1.9 MG/DL (ref 1.6–2.4)
MCH RBC QN AUTO: 31.3 PG (ref 26.6–33)
MCHC RBC AUTO-ENTMCNC: 33.6 G/DL (ref 31.5–35.7)
MCV RBC AUTO: 93.1 FL (ref 79–97)
MONOCYTES # BLD AUTO: 0.6 10*3/MM3 (ref 0.1–0.9)
MONOCYTES NFR BLD AUTO: 4.8 % (ref 5–12)
NEUTROPHILS NFR BLD AUTO: 10.85 10*3/MM3 (ref 1.7–7)
NEUTROPHILS NFR BLD AUTO: 87.6 % (ref 42.7–76)
NITRITE UR QL STRIP: NEGATIVE
NRBC BLD AUTO-RTO: 0 /100 WBC (ref 0–0.2)
NT-PROBNP SERPL-MCNC: 3843 PG/ML (ref 0–900)
PH UR STRIP.AUTO: 5.5 [PH] (ref 5–8)
PLATELET # BLD AUTO: 271 10*3/MM3 (ref 140–450)
PMV BLD AUTO: 10.3 FL (ref 6–12)
POTASSIUM SERPL-SCNC: 4.3 MMOL/L (ref 3.5–5.2)
PROCALCITONIN SERPL-MCNC: 0.09 NG/ML (ref 0–0.25)
PROT SERPL-MCNC: 7.2 G/DL (ref 6–8.5)
PROT UR QL STRIP: ABNORMAL
PROTHROMBIN TIME: 14.6 SECONDS (ref 11.7–14.2)
RBC # BLD AUTO: 5.18 10*6/MM3 (ref 4.14–5.8)
SODIUM SERPL-SCNC: 135 MMOL/L (ref 136–145)
SP GR UR STRIP: 1.02 (ref 1–1.03)
TSH SERPL DL<=0.05 MIU/L-ACNC: 0.68 UIU/ML (ref 0.27–4.2)
UROBILINOGEN UR QL STRIP: ABNORMAL
WBC NRBC COR # BLD: 12.39 10*3/MM3 (ref 3.4–10.8)

## 2023-10-03 PROCEDURE — 85730 THROMBOPLASTIN TIME PARTIAL: CPT | Performed by: INTERNAL MEDICINE

## 2023-10-03 PROCEDURE — G0378 HOSPITAL OBSERVATION PER HR: HCPCS

## 2023-10-03 PROCEDURE — 99284 EMERGENCY DEPT VISIT MOD MDM: CPT

## 2023-10-03 PROCEDURE — 82550 ASSAY OF CK (CPK): CPT | Performed by: INTERNAL MEDICINE

## 2023-10-03 PROCEDURE — 85610 PROTHROMBIN TIME: CPT | Performed by: INTERNAL MEDICINE

## 2023-10-03 PROCEDURE — 80053 COMPREHEN METABOLIC PANEL: CPT | Performed by: EMERGENCY MEDICINE

## 2023-10-03 PROCEDURE — 63710000001 INSULIN GLARGINE PER 5 UNITS: Performed by: INTERNAL MEDICINE

## 2023-10-03 PROCEDURE — 85025 COMPLETE CBC W/AUTO DIFF WBC: CPT | Performed by: EMERGENCY MEDICINE

## 2023-10-03 PROCEDURE — 51702 INSERT TEMP BLADDER CATH: CPT

## 2023-10-03 PROCEDURE — 84145 PROCALCITONIN (PCT): CPT | Performed by: INTERNAL MEDICINE

## 2023-10-03 PROCEDURE — 83605 ASSAY OF LACTIC ACID: CPT | Performed by: INTERNAL MEDICINE

## 2023-10-03 PROCEDURE — 96365 THER/PROPH/DIAG IV INF INIT: CPT

## 2023-10-03 PROCEDURE — 83735 ASSAY OF MAGNESIUM: CPT | Performed by: INTERNAL MEDICINE

## 2023-10-03 PROCEDURE — 83036 HEMOGLOBIN GLYCOSYLATED A1C: CPT | Performed by: INTERNAL MEDICINE

## 2023-10-03 PROCEDURE — 99214 OFFICE O/P EST MOD 30 MIN: CPT

## 2023-10-03 PROCEDURE — 82948 REAGENT STRIP/BLOOD GLUCOSE: CPT

## 2023-10-03 PROCEDURE — 63710000001 INSULIN LISPRO (HUMAN) PER 5 UNITS: Performed by: INTERNAL MEDICINE

## 2023-10-03 PROCEDURE — 81003 URINALYSIS AUTO W/O SCOPE: CPT | Performed by: EMERGENCY MEDICINE

## 2023-10-03 PROCEDURE — 99213 OFFICE O/P EST LOW 20 MIN: CPT | Performed by: SURGERY

## 2023-10-03 PROCEDURE — 83880 ASSAY OF NATRIURETIC PEPTIDE: CPT | Performed by: INTERNAL MEDICINE

## 2023-10-03 PROCEDURE — 84443 ASSAY THYROID STIM HORMONE: CPT | Performed by: INTERNAL MEDICINE

## 2023-10-03 PROCEDURE — 25010000002 PIPERACILLIN SOD-TAZOBACTAM PER 1 G: Performed by: EMERGENCY MEDICINE

## 2023-10-03 PROCEDURE — 25010000002 PIPERACILLIN SOD-TAZOBACTAM PER 1 G: Performed by: INTERNAL MEDICINE

## 2023-10-03 RX ORDER — MELATONIN
5000 EVERY 24 HOURS
Status: DISCONTINUED | OUTPATIENT
Start: 2023-10-03 | End: 2023-10-07 | Stop reason: HOSPADM

## 2023-10-03 RX ORDER — BUMETANIDE 0.25 MG/ML
1 INJECTION INTRAMUSCULAR; INTRAVENOUS ONCE
Status: DISCONTINUED | OUTPATIENT
Start: 2023-10-03 | End: 2023-10-07 | Stop reason: HOSPADM

## 2023-10-03 RX ORDER — CALCIUM CARBONATE 500 MG/1
2 TABLET, CHEWABLE ORAL 2 TIMES DAILY PRN
Status: DISCONTINUED | OUTPATIENT
Start: 2023-10-03 | End: 2023-10-07 | Stop reason: HOSPADM

## 2023-10-03 RX ORDER — DEXTROSE MONOHYDRATE 25 G/50ML
10-50 INJECTION, SOLUTION INTRAVENOUS
Status: DISCONTINUED | OUTPATIENT
Start: 2023-10-03 | End: 2023-10-07 | Stop reason: HOSPADM

## 2023-10-03 RX ORDER — AMOXICILLIN 250 MG
2 CAPSULE ORAL 2 TIMES DAILY
Status: DISCONTINUED | OUTPATIENT
Start: 2023-10-03 | End: 2023-10-07 | Stop reason: HOSPADM

## 2023-10-03 RX ORDER — SODIUM CHLORIDE 0.9 % (FLUSH) 0.9 %
10 SYRINGE (ML) INJECTION AS NEEDED
Status: DISCONTINUED | OUTPATIENT
Start: 2023-10-03 | End: 2023-10-07 | Stop reason: HOSPADM

## 2023-10-03 RX ORDER — SODIUM CHLORIDE 0.9 % (FLUSH) 0.9 %
10 SYRINGE (ML) INJECTION EVERY 12 HOURS SCHEDULED
Status: DISCONTINUED | OUTPATIENT
Start: 2023-10-03 | End: 2023-10-07 | Stop reason: HOSPADM

## 2023-10-03 RX ORDER — TRAMADOL HYDROCHLORIDE 50 MG/1
50 TABLET ORAL NIGHTLY PRN
Status: DISCONTINUED | OUTPATIENT
Start: 2023-10-03 | End: 2023-10-07 | Stop reason: HOSPADM

## 2023-10-03 RX ORDER — INSULIN LISPRO 100 [IU]/ML
1-200 INJECTION, SOLUTION INTRAVENOUS; SUBCUTANEOUS AS NEEDED
Status: DISCONTINUED | OUTPATIENT
Start: 2023-10-03 | End: 2023-10-07 | Stop reason: HOSPADM

## 2023-10-03 RX ORDER — TESTOSTERONE 12.5 MG/1.25G
25 GEL TOPICAL DAILY
Status: DISCONTINUED | OUTPATIENT
Start: 2023-10-04 | End: 2023-10-07

## 2023-10-03 RX ORDER — UREA 10 %
3 LOTION (ML) TOPICAL NIGHTLY PRN
Status: DISCONTINUED | OUTPATIENT
Start: 2023-10-03 | End: 2023-10-07 | Stop reason: HOSPADM

## 2023-10-03 RX ORDER — ONDANSETRON 4 MG/1
4 TABLET, FILM COATED ORAL EVERY 6 HOURS PRN
Status: DISCONTINUED | OUTPATIENT
Start: 2023-10-03 | End: 2023-10-07 | Stop reason: HOSPADM

## 2023-10-03 RX ORDER — POLYETHYLENE GLYCOL 3350 17 G/17G
17 POWDER, FOR SOLUTION ORAL DAILY PRN
Status: DISCONTINUED | OUTPATIENT
Start: 2023-10-03 | End: 2023-10-07 | Stop reason: HOSPADM

## 2023-10-03 RX ORDER — ACETAMINOPHEN 160 MG/5ML
650 SOLUTION ORAL EVERY 4 HOURS PRN
Status: DISCONTINUED | OUTPATIENT
Start: 2023-10-03 | End: 2023-10-07 | Stop reason: HOSPADM

## 2023-10-03 RX ORDER — ONDANSETRON 2 MG/ML
4 INJECTION INTRAMUSCULAR; INTRAVENOUS EVERY 6 HOURS PRN
Status: DISCONTINUED | OUTPATIENT
Start: 2023-10-03 | End: 2023-10-07 | Stop reason: HOSPADM

## 2023-10-03 RX ORDER — BISACODYL 10 MG
10 SUPPOSITORY, RECTAL RECTAL DAILY PRN
Status: DISCONTINUED | OUTPATIENT
Start: 2023-10-03 | End: 2023-10-07 | Stop reason: HOSPADM

## 2023-10-03 RX ORDER — SODIUM CHLORIDE 9 MG/ML
40 INJECTION, SOLUTION INTRAVENOUS AS NEEDED
Status: DISCONTINUED | OUTPATIENT
Start: 2023-10-03 | End: 2023-10-07 | Stop reason: HOSPADM

## 2023-10-03 RX ORDER — NICOTINE POLACRILEX 4 MG
15 LOZENGE BUCCAL
Status: DISCONTINUED | OUTPATIENT
Start: 2023-10-03 | End: 2023-10-07 | Stop reason: HOSPADM

## 2023-10-03 RX ORDER — ACETAMINOPHEN 325 MG/1
650 TABLET ORAL EVERY 4 HOURS PRN
Status: DISCONTINUED | OUTPATIENT
Start: 2023-10-03 | End: 2023-10-07 | Stop reason: HOSPADM

## 2023-10-03 RX ORDER — LATANOPROST 50 UG/ML
1 SOLUTION/ DROPS OPHTHALMIC NIGHTLY
Status: DISCONTINUED | OUTPATIENT
Start: 2023-10-03 | End: 2023-10-07 | Stop reason: HOSPADM

## 2023-10-03 RX ORDER — UREA 10 %
27 LOTION (ML) TOPICAL DAILY
Status: DISCONTINUED | OUTPATIENT
Start: 2023-10-04 | End: 2023-10-07 | Stop reason: HOSPADM

## 2023-10-03 RX ORDER — BUMETANIDE 0.5 MG/1
0.5 TABLET ORAL
Status: DISCONTINUED | OUTPATIENT
Start: 2023-10-03 | End: 2023-10-07 | Stop reason: HOSPADM

## 2023-10-03 RX ORDER — ACETAMINOPHEN 650 MG/1
650 SUPPOSITORY RECTAL EVERY 4 HOURS PRN
Status: DISCONTINUED | OUTPATIENT
Start: 2023-10-03 | End: 2023-10-07 | Stop reason: HOSPADM

## 2023-10-03 RX ORDER — BISACODYL 5 MG/1
5 TABLET, DELAYED RELEASE ORAL DAILY PRN
Status: DISCONTINUED | OUTPATIENT
Start: 2023-10-03 | End: 2023-10-07 | Stop reason: HOSPADM

## 2023-10-03 RX ORDER — INSULIN LISPRO 100 [IU]/ML
1-200 INJECTION, SOLUTION INTRAVENOUS; SUBCUTANEOUS
Status: DISCONTINUED | OUTPATIENT
Start: 2023-10-03 | End: 2023-10-07 | Stop reason: HOSPADM

## 2023-10-03 RX ORDER — BRIMONIDINE TARTRATE 2 MG/ML
1 SOLUTION/ DROPS OPHTHALMIC 2 TIMES DAILY
Status: DISCONTINUED | OUTPATIENT
Start: 2023-10-03 | End: 2023-10-07 | Stop reason: HOSPADM

## 2023-10-03 RX ORDER — DOCUSATE SODIUM 100 MG/1
100 CAPSULE, LIQUID FILLED ORAL DAILY
Status: DISCONTINUED | OUTPATIENT
Start: 2023-10-03 | End: 2023-10-07 | Stop reason: HOSPADM

## 2023-10-03 RX ORDER — ASPIRIN 81 MG/1
81 TABLET ORAL DAILY
Status: DISCONTINUED | OUTPATIENT
Start: 2023-10-03 | End: 2023-10-07 | Stop reason: HOSPADM

## 2023-10-03 RX ORDER — BRIMONIDINE TARTRATE 2 MG/ML
1 SOLUTION/ DROPS OPHTHALMIC 2 TIMES DAILY
COMMUNITY

## 2023-10-03 RX ORDER — IBUPROFEN 600 MG/1
1 TABLET ORAL
Status: DISCONTINUED | OUTPATIENT
Start: 2023-10-03 | End: 2023-10-07 | Stop reason: HOSPADM

## 2023-10-03 RX ORDER — SACCHAROMYCES BOULARDII 250 MG
250 CAPSULE ORAL 2 TIMES DAILY
Status: DISCONTINUED | OUTPATIENT
Start: 2023-10-03 | End: 2023-10-07 | Stop reason: HOSPADM

## 2023-10-03 RX ADMIN — INSULIN GLARGINE 20 UNITS: 100 INJECTION, SOLUTION SUBCUTANEOUS at 22:30

## 2023-10-03 RX ADMIN — DOCUSATE SODIUM 100 MG: 100 CAPSULE, LIQUID FILLED ORAL at 19:01

## 2023-10-03 RX ADMIN — PIPERACILLIN SODIUM AND TAZOBACTAM SODIUM 3.38 G: 3; .375 INJECTION, SOLUTION INTRAVENOUS at 14:55

## 2023-10-03 RX ADMIN — Medication 10 ML: at 22:34

## 2023-10-03 RX ADMIN — INSULIN LISPRO 2 UNITS: 100 INJECTION, SOLUTION INTRAVENOUS; SUBCUTANEOUS at 22:30

## 2023-10-03 RX ADMIN — BRIMONIDINE TARTRATE 1 DROP: 2 SOLUTION OPHTHALMIC at 20:00

## 2023-10-03 RX ADMIN — PIPERACILLIN SODIUM AND TAZOBACTAM SODIUM 3.38 G: 3; .375 INJECTION, SOLUTION INTRAVENOUS at 22:28

## 2023-10-03 RX ADMIN — INSULIN LISPRO 3 UNITS: 100 INJECTION, SOLUTION INTRAVENOUS; SUBCUTANEOUS at 19:01

## 2023-10-03 RX ADMIN — Medication 5000 UNITS: at 19:01

## 2023-10-03 RX ADMIN — ASPIRIN 81 MG: 81 TABLET, COATED ORAL at 19:01

## 2023-10-03 RX ADMIN — BUMETANIDE 0.5 MG: 0.5 TABLET ORAL at 20:00

## 2023-10-03 NOTE — H&P
"PCP: Tena Taylor MD    Chief complaint   Chief Complaint   Patient presents with    Abscess           Urinary Retention              HPI  Patient is a 73 y.o. male presents with history of diabetes type 2, hypertension, chronic diastolic heart failure, history of stroke, history of urinary retention, sleep apnea not on CPAP, pulmonary hypertension, cervical and lumbar stenosis who presents with urinary retention and sacral wound.     Pt saw Dr Le with neurology and saw bilateral foot drop, and zero reflexes on patellar and achilles and ordered mri c and l spine.    Per Dr. Woodson note in July 2023 :\"MRI of the lumbar spine done on 11 July of this year. This shows a widely patent canal and neuroforamina at T12-L1. L1-2 shows some foraminal stenosis. L2-3 shows more severe lateral recess and foraminal stenosis as well as central stenosis. L3-4 shows almost no CSF at all. L4-5 shows a little foraminal stenosis but not severe there is a grade 1 spondylolisthesis at L5-S1 shows some midline disc bulging but otherwise looks okay. In the cervical spine which was done the same day the C2-3 level is widely open. C3-4 shows some central stenosis with cord compression. C4-5 also shows cord compression. C5-6 shows pretty severe narrowing and at least cord distortion if not some cord compression. C6-7 shows some spondylitic change and central disc bulging along with foraminal stenosis. C7-T1 looks okay. \"  The plan was that Dr. Woodson explained and At that point \"his exam is not terribly myelopathic. This does not appear to be severe enough to be causing a cauda equina syndrome. I suggested that the next step should be a total spine myelogram.\"  However patient has had a myelogram before and did not want another one.  He wanted pain medicines but did not want to be referred to a pain management doctor. Dr. Woodson noted \"Ultimately he asked me if there was anything I could do for him and I explained that in the absence of " "following any of my suggestions I really do not have a lot of other things to offer. He was not very happy with me when he left saying that I just came in talk to minute and walked out\".    Pt told [facility] that neurosurgeon had told him that he did not need neck surgery and he was going to go to a different neurosurgeon. (Pt Either did not understand or did not relay accurate plan).    Patient oftentimes does not take his Bumex regularly especially the second dose because it causes him to stay up all night urinating.  That was also noted and [facility] note.  He presented today and was only having small amounts of urine each time and was found to have 900 cc postvoid.  Wife also states that he has not taken his Bumex for the last few days because someone felt like they he might be dehydrated and he has not been eating as much.      About 2 weeks ago patient started having a wound in between his buttocks.  his PCP Dr. Taylor and home health with [facility] came out and took some cultures.  He was on amoxicillin for 5 days and then changed over to Augmentin for the last 2 days.    Also states that his family doctor \"told him not to take his Lantus until he is eating more regularly.\" (Unsure if that is accurate especially given the history) so his blood sugars have been in the 200s despite using short acting for sliding scales.    PAST MEDICAL HISTORY  Past Medical History:   Diagnosis Date    AMENA (acute kidney injury) 12/03/2020    CORI positive     Anemia     Anxiety     Ataxia     Atypical chest pain 04/19/2022    SEEN AT [facility] ER    Balance disorder     Cataract     BILATERAL, S/P EXTRACTION    Cervical radiculopathy 11/11/2019    SEEN AT  [facility] ER    Cervical spinal cord compression     Chronic diastolic (congestive) heart failure     Chronic kidney disease     STAGE 3, FOLLOWED BY DR. VIVAR    Chronic pancreatitis     Closed left subtrochanteric femur fracture 12/01/2020    ADMITTED TO [facility]    Closed " "nondisplaced intertrochanteric fracture of left femur 12/01/2020    ADMITTED TO [facility]    Colon polyps     FOLLOWED BY DR. AVTAR JEONG    Constipation     Contracture, right hand     Coronary artery disease     CABG 7/2019    COVID-19 07/2022    DDD (degenerative disc disease), cervical     Diabetes mellitus, type II     IDDM    Diabetic retinopathy     Dysphagia     Elevated brain natriuretic peptide (BNP) level 08/2014    Elevated LFTs 03/2021    Erectile dysfunction     Foot drop     Fuchs' corneal dystrophy of right eye     Glaucoma     BILATERAL    History of alcohol abuse     Hyperlipidemia     Hypersomnia     Hypertension     Hypertensive urgency 02/25/2020    ADMITTED TO [facility]    Insomnia     Kidney stones     LV dysfunction 06/2016    Lyme disease     Lymphadenopathy syndrome 05/2021    Macular edema     BILATERAL    Myocardial infarction 11/05/2019    NSTEMI, ADMITTED TO [facility]    Myocardial infarction 07/12/2019    NSTEMI, ADMITTED TO [facility]    Non-celiac gluten sensitivity     Orthostasis     Osteoporosis     Oxygen dependent     PAD (peripheral artery disease)     PNA (pneumonia) 06/2016    LEFT LOBE    Polyneuropathy     PTSD (post-traumatic stress disorder)     Pulmonary hypertension     Pulmonary nodule     Senile ectropion of both lower eyelids 02/2022    Sepsis 12/16/2020    D/T UTI, ADMITTED TO [facility]    Sleep apnea     STATES DOESN'T USE BIPAP OR CPAP    Spinal stenosis in cervical region     SEVERE-LIMITED ROM    Stroke 2012    \"slight stroke\"    Syncope and collapse 07/06/2019    ADMITTED TO [place]    Urinary retention 04/05/2021    SEEN AT [facility] ER    Vitamin D deficiency        PAST SURGICAL HISTORY  Past Surgical History:   Procedure Laterality Date    CARDIAC CATHETERIZATION N/A 07/15/2019    Procedure: Coronary angiography;  Surgeon: Carrie Price MD;  Location:  [facility];  Service: Cardiovascular    CARDIAC CATHETERIZATION N/A 07/15/2019    Procedure: Left Heart Cath;  Surgeon: " Carrie Price MD;  Location:  [facility];  Service: Cardiovascular    CARDIAC CATHETERIZATION N/A 07/15/2019    Procedure: Left ventriculography;  Surgeon: Carrie Price MD;  Location:  [facility];  Service: Cardiovascular    CARDIAC CATHETERIZATION  07/15/2019    Procedure: Functional Flow Melvin;  Surgeon: Carrie Price MD;  Location:  [facility];  Service: Cardiovascular    CARDIAC CATHETERIZATION N/A 11/06/2019    Procedure: Right and Left Heart Cath;  Surgeon: Mya Smith MD;  Location:  [facility];  Service: Cardiovascular    CARDIAC CATHETERIZATION N/A 11/06/2019    Procedure: Coronary angiography;  Surgeon: Mya Smith MD;  Location: [facility] CATH INVASIVE LOCATION;  Service: Cardiovascular    CARDIAC SURGERY      CATARACT EXTRACTION Left 2014    CATARACT EXTRACTION Right 11/2016    PHACO/IOL, DR. LEN DENTON    COLONOSCOPY N/A 03/16/2023    ENTIRE COLON WNL, RESCOPE IN 5 YRS, DR. LINDA LIZARRAGA AT [facility]    COLONOSCOPY W/ POLYPECTOMY N/A 01/02/2015    A FEW DIVERTICULA IN SIGMOID, 6 MM TUBULOVILLOUS ADENOMA POLYP IN RECTUM, SMALL HEMORRHOIDS, MELANOSIS COLI, DR. AVTAR JEONG AT [facility]    CORONARY ARTERY BYPASS GRAFT N/A 07/18/2019    Procedure: INTRAOPERATIVE SHOAIB; STERNOTOMY CORONARY ARTERY BYPASS x 3  USING LEFT INTERNAL MAMMARY ARTERY GRAFT UTILIZING ENDOSCOPICALLY HARVESTED RIGHT GREATER SAPHENOUS VEIN AND PRP.;  Surgeon: Bill Devi MD;  Location: [facility] MAIN OR;  Service: Cardiothoracic    CYSTOSCOPY BLADDER STONE LITHOTRIPSY N/A     DENTAL PROCEDURE Bilateral     3 surgeries inder implants    FEMUR IM NAILING/RODDING Left 12/03/2020    Procedure: LEFT HIP INTRAMEDULLARY NAIL;  Surgeon: Niraj Ravi MD;  Location: [facility] MAIN OR;  Service: Orthopedic Spine;  Laterality: Left;    INGUINAL HERNIA REPAIR Bilateral     TOENAIL EXCISION  06/2022    TONSILLECTOMY Bilateral     TOTAL HIP ARTHROPLASTY  REVISION Left 2022    Procedure: TOTAL HIP ARTHROPLASTY REVISION- POSTERIOR;  Surgeon: Jurgen Candelaria II, MD;  Location: [facility] MAIN OR;  Service: Orthopedics;  Laterality: Left;    VASECTOMY N/A        FAMILY HISTORY  Family History   Problem Relation Age of Onset    Heart disease Mother     Hypertension Mother     Hyperlipidemia Mother     Depression Mother     Cancer Mother         uterine    Arrhythmia Mother     Uterine cancer Mother     Hyperlipidemia Father     Heart disease Father     Hypertension Father     Kidney disease Father     Thyroid disease Father     Heart failure Father     Depression Sister     Alcohol abuse Brother     Thyroid disease Paternal Uncle     Lung disease Paternal Uncle     Stroke Maternal Grandmother     Glaucoma Maternal Grandmother     Heart attack Paternal Grandmother     Stroke Paternal Grandmother     Alcohol abuse Paternal Grandfather     Malig Hyperthermia Neg Hx        SOCIAL HISTORY  Social History     Tobacco Use    Smoking status: Former     Packs/day: 0.75     Years: 16.00     Pack years: 12.00     Types: Cigarettes     Quit date:      Years since quittin.7     Passive exposure: Past    Smokeless tobacco: Never    Tobacco comments:     Start age:39/Stopping age:47, 3/4 PPD   Vaping Use    Vaping Use: Never used   Substance Use Topics    Alcohol use: Not Currently     Comment: alcohol abuse, none x 25 years / caffeine use - 1 cup daily    Drug use: No       MEDICATIONS:  Medications Prior to Admission   Medication Sig Dispense Refill Last Dose    aspirin 81 MG EC tablet Take 1 tablet by mouth Daily.       brimonidine (ALPHAGAN) 0.2 % ophthalmic solution Administer 1 drop to both eyes 2 (Two) Times a Day.       Cholecalciferol (Vitamin D-3) 125 MCG (5000 UT) tablet Take 1 tablet by mouth Daily.       Docusate Sodium 100 MG capsule Take 1 tablet by mouth Daily.       Insulin Glargine, 2 Unit Dial, (TOUJEO) 300 UNIT/ML solution pen-injector injection  Inject 30 Units under the skin into the appropriate area as directed Daily.       insulin lispro (humaLOG) 100 UNIT/ML injection Inject 0-14 Units under the skin into the appropriate area as directed 3 (Three) Times a Day Before Meals. (Patient taking differently: Inject 0-14 Units under the skin into the appropriate area as directed 3 (Three) Times a Day Before Meals. SLIDING SCALE)  12     latanoprost (XALATAN) 0.005 % ophthalmic solution Administer 1 drop to both eyes every night at bedtime.       MAGNESIUM PO Take 1 tablet by mouth Daily.       sildenafil (REVATIO) 20 MG tablet TAKE 1-3 TABLETS 1 HR PRIOR TO SEXUAL ACTIVITY (Patient taking differently: Take 1 tablet by mouth As Needed.) 20 tablet 0     testosterone (ANDROGEL) 25 MG/2.5GM (1%) gel gel Place 25 mg on the skin as directed by provider Daily.       traMADol (ULTRAM) 50 MG tablet Take 1 tablet by mouth Every 8 (Eight) Hours As Needed for Severe Pain. (Patient taking differently: Take 1 tablet by mouth At Night As Needed for Severe Pain.) 40 tablet 0     Unable to find Apply 1 each topically to the appropriate area as directed 1 (One) Time. Cbd pain stick       bumetanide (BUMEX) 1 MG tablet Take 0.5 tablets by mouth 2 (Two) Times a Day. Holding Since 9/30/23       mometasone (ELOCON) 0.1 % ointment Apply 1 application topically to the appropriate area as directed Daily. Do not exceed 1 week, needs to be off 1 week and can restart 15 g 3        Allergies:  Ace inhibitors, Losartan, Seroquel [quetiapine], Xanax [alprazolam], Amlodipine, Ativan [lorazepam], Baclofen, Minoxidil, and Tetracycline    Review of Systems:  fatigue , difficulty ambulating, wound, LE edema decreased appetite  Negative for following (except as per HPI):  Constitution: chills, fevers,   Eyes: change of vision, loss of vision and discharge  ENT: ear drainage, ear ringing and facial trauma  Respiratory: cough, pleuritic pain, shortness of air  Cardiovascular: chest pressure, pain,  palpitations  Gastrointestinal: constipation, diarrhea, nausea, vomiting, pain    Integument: rash   Hematologic / Lymphatic: excessive bleeding and easy bruising  Musculoskeletal: joint pain, joint stiffness, joint swelling and muscle pain  Neurological: headaches, numbness, seizures and tremors  Behavioral / Psych: anxiety, depression and hallucinations         Vital Signs  Temp:  [98.5 °F (36.9 °C)] 98.5 °F (36.9 °C)  Heart Rate:  [82-91] 86  Resp:  [18] 18  BP: (109-183)/(65-97) 183/89     There is no height or weight on file to calculate BMI.      Physical Exam:  General Appearance:    Alert, cooperative, in no acute distress,    Head:    Normocephalic, without obvious abnormality, atraumatic   Eyes:         conjunctivae and sclerae normal, no icterus, PERRLA   ENT:    Ears grossly intact, oral mucosa moist,   Neck:   No adenopathy, supple, trachea midline,    Back:     Normal to inspection, range of motion normal   Lungs:     Clear to auscultation,respirations regular, even and     unlabored    Heart:    Regular rhythm and normal rate,  no murmur, normal S1 and S2,   Abdomen:     Normal bowel sounds, no masses,  soft non-tender, non-distended,    Extremities:   Moves all extremities , no cyanosis, no edema,             Pulses:   Pulses palpable and equal bilaterally   Skin:   No bleeding, rash, bruising ; Rt sacral wound size of quarter white with discharge and significant surrounding induration and erythema; right heel wound   Neurologic:      Psych:   Cranial nerves 2 - 12 grossly intact, sensation grossly intact,     Moves all extremities , equal bilateral strength 3/5   Ambulation deferred    Alert and Oriented x 3, Normal Affect    I washed/sanitized my hands before entering the room and immediately upon leaving the room.      LABS:  Admission on 10/03/2023   Component Date Value Ref Range Status    Glucose 10/03/2023 263 (H)  65 - 99 mg/dL Final    BUN 10/03/2023 12  8 - 23 mg/dL Final    Creatinine  10/03/2023 0.98  0.76 - 1.27 mg/dL Final    Sodium 10/03/2023 135 (L)  136 - 145 mmol/L Final    Potassium 10/03/2023 4.3  3.5 - 5.2 mmol/L Final    Chloride 10/03/2023 92 (L)  98 - 107 mmol/L Final    CO2 10/03/2023 28.5  22.0 - 29.0 mmol/L Final    Calcium 10/03/2023 9.3  8.6 - 10.5 mg/dL Final    Total Protein 10/03/2023 7.2  6.0 - 8.5 g/dL Final    Albumin 10/03/2023 3.2 (L)  3.5 - 5.2 g/dL Final    ALT (SGPT) 10/03/2023 23  1 - 41 U/L Final    AST (SGOT) 10/03/2023 11  1 - 40 U/L Final    Alkaline Phosphatase 10/03/2023 73  39 - 117 U/L Final    Total Bilirubin 10/03/2023 0.6  0.0 - 1.2 mg/dL Final    Globulin 10/03/2023 4.0  gm/dL Final    A/G Ratio 10/03/2023 0.8  g/dL Final    BUN/Creatinine Ratio 10/03/2023 12.2  7.0 - 25.0 Final    Anion Gap 10/03/2023 14.5  5.0 - 15.0 mmol/L Final    eGFR 10/03/2023 80.9  >60.0 mL/min/1.73 Final    Color, UA 10/03/2023 Yellow  Yellow, Straw Final    Appearance, UA 10/03/2023 Clear  Clear Final    pH, UA 10/03/2023 5.5  5.0 - 8.0 Final    Specific Gravity, UA 10/03/2023 1.017  1.005 - 1.030 Final    Glucose, UA 10/03/2023 500 mg/dL (2+) (A)  Negative Final    Ketones, UA 10/03/2023 40 mg/dL (2+) (A)  Negative Final    Bilirubin, UA 10/03/2023 Negative  Negative Final    Blood, UA 10/03/2023 Negative  Negative Final    Protein, UA 10/03/2023 Trace (A)  Negative Final    Leuk Esterase, UA 10/03/2023 Negative  Negative Final    Nitrite, UA 10/03/2023 Negative  Negative Final    Urobilinogen, UA 10/03/2023 1.0 E.U./dL  0.2 - 1.0 E.U./dL Final    WBC 10/03/2023 12.39 (H)  3.40 - 10.80 10*3/mm3 Final    RBC 10/03/2023 5.18  4.14 - 5.80 10*6/mm3 Final    Hemoglobin 10/03/2023 16.2  13.0 - 17.7 g/dL Final    Hematocrit 10/03/2023 48.2  37.5 - 51.0 % Final    MCV 10/03/2023 93.1  79.0 - 97.0 fL Final    MCH 10/03/2023 31.3  26.6 - 33.0 pg Final    MCHC 10/03/2023 33.6  31.5 - 35.7 g/dL Final    RDW 10/03/2023 12.2 (L)  12.3 - 15.4 % Final    RDW-SD 10/03/2023 41.7  37.0 - 54.0 fl  Final    MPV 10/03/2023 10.3  6.0 - 12.0 fL Final    Platelets 10/03/2023 271  140 - 450 10*3/mm3 Final    Neutrophil % 10/03/2023 87.6 (H)  42.7 - 76.0 % Final    Lymphocyte % 10/03/2023 6.0 (L)  19.6 - 45.3 % Final    Monocyte % 10/03/2023 4.8 (L)  5.0 - 12.0 % Final    Eosinophil % 10/03/2023 0.4  0.3 - 6.2 % Final    Basophil % 10/03/2023 0.3  0.0 - 1.5 % Final    Immature Grans % 10/03/2023 0.9 (H)  0.0 - 0.5 % Final    Neutrophils, Absolute 10/03/2023 10.85 (H)  1.70 - 7.00 10*3/mm3 Final    Lymphocytes, Absolute 10/03/2023 0.74  0.70 - 3.10 10*3/mm3 Final    Monocytes, Absolute 10/03/2023 0.60  0.10 - 0.90 10*3/mm3 Final    Eosinophils, Absolute 10/03/2023 0.05  0.00 - 0.40 10*3/mm3 Final    Basophils, Absolute 10/03/2023 0.04  0.00 - 0.20 10*3/mm3 Final    Immature Grans, Absolute 10/03/2023 0.11 (H)  0.00 - 0.05 10*3/mm3 Final    nRBC 10/03/2023 0.0  0.0 - 0.2 /100 WBC Final         DIAGNOSTICS:  July mri  Multilevel canal narrowing is appreciated within the cervical spine and  this includes moderate-to-severe C3-4, moderate-to-severe C4-5,  moderate-to-severe C5-6, and moderate C6-7 canal stenosis. The canal  stenosis is most prominently seen at the C3-4 and C5-6 levels where  there is prominent cord compression. Just cephalad to the level of the  C3-4 disc space, there is subtle T2 hyperintensity within the cervical  spinal cord that is most suggestive of edema or myelomalacia related to  a compressive cervical spondyloarthropathy.  Additionally, there is multilevel foraminal stenosis within the cervical  spine. This includes mild left C2-3, moderate right C3-4, severe left  C3-4, severe bilateral C4-5, severe bilateral C5-6, severe bilateral  C6-7, and moderate right C7-T1 foraminal stenosi    Multilevel degenerative phenomena are appreciated within the lumbar  spine resulting in multilevel canal and foraminal stenotic changes. The  most prominent canal stenosis is appreciated at the L3-4 level  where  there is a moderate-to-severe degree of central canal stenosis secondary  to bulging disc material, a right subarticular disc protrusion, and  ligamentum flavum thickening. Additionally, this disc protrusion is  within the position of causing mass effect upon the descending right L4  nerve root. Also, there is a mild-to-moderate degree of central canal  stenosis at the L2-3 level secondary to a disc osteophyte complex and  facet hypertrophic change. Multilevel foraminal stenotic changes are  noted within the lumbar spine and most prominently at L2-3, L3-4, L4-5,  and L5-S1. Again, there is moderate left L5-S1, moderate right L4-5,  moderate-to-severe right L3-4, moderate left L3-4, and moderate  bilateral L2-3 foraminal narrowing.       Results Review:   I reviewed the patient's new clinical results.  Discussed with ER physician  Old records reviewed / Medical Decision Making High Complexity  I personally viewed and interpreted the patient's EKG/Telemetry data- sinus rhythm      ASSESSMENT AND PLAN    Urinary retention    CAD (coronary artery disease)    Hx of CABG    Chronic diastolic heart failure    CSA (central sleep apnea)    HTN (hypertension)    History of stroke    CKD (chronic kidney disease) stage 3, GFR 30-59 ml/min    Type 2 diabetes mellitus with diabetic neuropathy, with long-term current use of insulin    Cervical stenosis of spine    Spinal stenosis, lumbar region, without neurogenic claudication    Cellulitis    Gluteal abscess    Medically noncompliant    Urinary retention  -Differential diagnosis is secondary to gluteal wound and induration/lumbar stenosis/secondary medications and possible prostate issues.  Patient had similar episode in 2021  -Neurosurgery is consulted, general surgery is evaluating wound, and patient's urologist is consulted  -catheter care      Spinal stenosis, lumbar region, without neurogenic claudication / ataxia  -Patient denies saddle anesthesia.  -MRI done in July  and plan was for myelogram await further recs      Gluteal abscess/ Cellulitis  -Local wound care  -General surgery planning to take to the OR in the morning  -Continue Zosyn  -We will need cultures in the OR to help direct antibiotics (patient has been on amoxicillin for 5 days and Augmentin for 2 days now)      Type 2 diabetes mellitus with diabetic neuropathy, with long-term current use of insulin  --Accu-Cheks  -hypoglycemia protocol  -sliding scale insulin to cover outlier blood sugars  Home regimen is Lantus 30 units in the morning however patient has not been taking it for a few days since he has not really been eating much and supposedly they were told by primary care doctor to just hold it.  However this is probably why the patient's blood sugars have been out of control despite them trying to do sliding scale insulin.  Educated the patient that they can take 1/2-2/3 even if they are n.p.o. for a procedure or not eating or what ever  -pt started on glucocomander.  Starting Lantus 20 units at night, 3 with meals plus sliding scale      Chronic diastolic heart failure and has not been taking Bumex for a few days therefore BNP and patient has hyponatremia hypochloremia  -We will get chest x-ray, IV Bumex x1  -Continue oral Bumex    Noncompliance- unsure if this is an educational issue, stubbornness issue, or not understanding.  Patient does not take his Bumex that he should, or not at all, not really sure what the primary care doctor has actually been seen.  He did not really like neurosurgery's plan last time and seems to pick and choose which treatments he wants to do.    Chronic medical conditions being monitored- stable, continue medical management  - CKD (chronic kidney disease) stage 3, GFR 30-59 ml/min  -   Chronic diastolic heart failure    CSA (central sleep apnea)    HTN (hypertension)    History of stroke    CAD (coronary artery disease)    Hx of CABG  H/o etoh    +DVT proph:    scd - going to or  +  CODE STATUS: Full     I discussed the patient's findings and my recommendations with the patient and/or family.  Please reference all orders placed.    Minal Haley MD  10/03/23  15:55 EDT      This document is intended for medical expert use only. Reading of this document by patients and/or patient's family without participating in medical staff guidance may result in misinterpretation and unintended morbidity. Any interpretation of such data is the responsibility of the patient and/or family member responsible for the patient in concert with their primary or specialist providers, and NOT to be left for sources of online searches such as [employer], [employer] or similar queries. Relying on these approaches to knowledge may result in misinterpretation, misguided goals of care and even death should patients or family members try recommendations outside of the realm of professional medical care in a supervised way.    Dictated utilizing Voice dictation:  Parts of this note may be an electronic transcription/translation of spoke language to printed text using Dragon dictation system.

## 2023-10-03 NOTE — CONSULTS
Colorectal & General Surgery  Consultation    Patient: Dilip Verma  YOB: 1950  MRN: [id number]      Assessment  Dilip Verma is a 73 y.o. male with right-sided perianal and buttock abscess.  I discussed the risk, benefits, alternatives of incision and drainage of this abscess in the operating room, including the risk of fistula formation and recurrence.  He wishes to proceed to the operating room tomorrow.    Plan  N.p.o. after midnight  Continue antibiotics  We will proceed to the operating room tomorrow for incision and drainage of perianal and buttock abscess      History of Present Illness   Dilip Verma is a 73 y.o. male who I am seeing in consultation regarding perianal abscess at the request of Minal Haley.  He presented to the hospital today with complaints of urinary retention.  He was also found to have an abscess on his right buttock and perianal area.  He states that this abscess is present for at least 2 weeks.  He denies fever and chills.  He has not tried to treat this at home in any way.    Past Medical History   Past Medical History:   Diagnosis Date    AMENA (acute kidney injury) 12/03/2020    CORI positive     Anemia     Anxiety     Ataxia     Atypical chest pain 04/19/2022    [facility]    Balance disorder     Cataract     BILATERAL, S/P EXTRACTION    Cervical radiculopathy 11/11/2019    [facility]    Cervical spinal cord compression     Chronic diastolic (congestive) heart failure     Chronic kidney disease     STAGE 3, FOLLOWED BY DR. VIVAR    Chronic pancreatitis     Closed left subtrochanteric femur fracture 12/01/2020    [facility]    Closed nondisplaced intertrochanteric fracture of left femur 12/01/2020    [facility]    Colon polyps     FOLLOWED BY DR. AVTAR JEONG    Constipation     Contracture, right hand     Coronary artery disease     CABG 7/2019    COVID-19 07/2022    DDD (degenerative disc disease), cervical     Diabetes mellitus, type II   "   IDDM    Diabetic retinopathy     Dysphagia     Elevated brain natriuretic peptide (BNP) level 08/2014    Elevated LFTs 03/2021    Erectile dysfunction     Foot drop     Fuchs' corneal dystrophy of right eye     Glaucoma     BILATERAL    History of alcohol abuse     Hyperlipidemia     Hypersomnia     Hypertension     Hypertensive urgency 02/25/2020    ADMITTED TO [facility]    Insomnia     Kidney stones     LV dysfunction 06/2016    Lyme disease     Lymphadenopathy syndrome 05/2021    Macular edema     BILATERAL    Myocardial infarction 11/05/2019    NSTEMI, ADMITTED TO [facility]    Myocardial infarction 07/12/2019    NSTEMI, ADMITTED TO [facility]    Non-celiac gluten sensitivity     Orthostasis     Osteoporosis     Oxygen dependent     PAD (peripheral artery disease)     PNA (pneumonia) 06/2016    LEFT LOBE    Polyneuropathy     PTSD (post-traumatic stress disorder)     Pulmonary hypertension     Pulmonary nodule     Senile ectropion of both lower eyelids 02/2022    Sepsis 12/16/2020    D/T UTI, ADMITTED TO [facility]    Sleep apnea     STATES DOESN'T USE BIPAP OR CPAP    Spinal stenosis in cervical region     SEVERE-LIMITED ROM    Stroke 2012    \"slight stroke\"    Syncope and collapse 07/06/2019    ADMITTED TO [place]    Urinary retention 04/05/2021    SEEN AT [facility] ER    Vitamin D deficiency         Past Surgical History   Past Surgical History:   Procedure Laterality Date    CARDIAC CATHETERIZATION N/A 07/15/2019    Procedure: Coronary angiography;  Surgeon: Carrie Price MD;  Location:  [facility];  Service: Cardiovascular    CARDIAC CATHETERIZATION N/A 07/15/2019    Procedure: Left Heart Cath;  Surgeon: Carrie Price MD;  Location:  [facility];  Service: Cardiovascular    CARDIAC CATHETERIZATION N/A 07/15/2019    Procedure: Left ventriculography;  Surgeon: Carrie Price MD;  Location:  [facility];  Service: Cardiovascular    CARDIAC CATHETERIZATION  07/15/2019    Procedure: " Functional Flow [place];  Surgeon: Carrie Price MD;  Location:  [facility];  Service: Cardiovascular    CARDIAC CATHETERIZATION N/A 11/06/2019    Procedure: Right and Left Heart Cath;  Surgeon: Mya Smith MD;  Location:  [facility];  Service: Cardiovascular    CARDIAC CATHETERIZATION N/A 11/06/2019    Procedure: Coronary angiography;  Surgeon: Mya Smith MD;  [facility];  Service: Cardiovascular    CARDIAC SURGERY      CATARACT EXTRACTION Left 2014    CATARACT EXTRACTION Right 11/2016    PHACO/IOL, DR. LEN DENTON    COLONOSCOPY N/A 03/16/2023    ENTIRE COLON WNL, RESCOPE IN 5 YRS, DR. LINDA LIZARRAGA AT Located [facility]    COLONOSCOPY W/ POLYPECTOMY N/A 01/02/2015    A FEW DIVERTICULA IN SIGMOID, 6 MM TUBULOVILLOUS ADENOMA POLYP IN RECTUM, SMALL HEMORRHOIDS, MELANOSIS COLI, DR. AVTAR JEONG AT [place] [facility]    CORONARY ARTERY BYPASS GRAFT N/A 07/18/2019    Procedure: INTRAOPERATIVE SHOAIB; STERNOTOMY CORONARY ARTERY BYPASS x 3  USING LEFT INTERNAL MAMMARY ARTERY GRAFT UTILIZING ENDOSCOPICALLY HARVESTED RIGHT GREATER SAPHENOUS VEIN AND PRP.;  Surgeon: Bill Devi MD;  Location: [facility] MAIN OR;  Service: Cardiothoracic    CYSTOSCOPY BLADDER STONE LITHOTRIPSY N/A     DENTAL PROCEDURE Bilateral     3 surgeries inder implants    FEMUR IM NAILING/RODDING Left 12/03/2020    Procedure: LEFT HIP INTRAMEDULLARY NAIL;  Surgeon: Niraj Ravi MD;  Location: [facility] MAIN OR;  Service: Orthopedic Spine;  Laterality: Left;    INGUINAL HERNIA REPAIR Bilateral     TOENAIL EXCISION  06/2022    TONSILLECTOMY Bilateral     TOTAL HIP ARTHROPLASTY REVISION Left 01/11/2022    Procedure: TOTAL HIP ARTHROPLASTY REVISION- POSTERIOR;  Surgeon: Jurgen Candelaria II, MD;  Location: [facility] MAIN OR;  Service: Orthopedics;  Laterality: Left;    VASECTOMY N/A        Social History  Social History     Socioeconomic History    Marital status:    Tobacco Use     Smoking status: Former     Packs/day: 0.75     Years: 16.00     Pack years: 12.00     Types: Cigarettes     Quit date:      Years since quittin.7     Passive exposure: Past    Smokeless tobacco: Never    Tobacco comments:     Start age:39/Stopping age:47, 3/4 PPD   Vaping Use    Vaping Use: Never used   Substance and Sexual Activity    Alcohol use: Not Currently     Comment: alcohol abuse, none x 25 years / caffeine use - 1 cup daily    Drug use: No    Sexual activity: Defer       Family History  Family History   Problem Relation Age of Onset    Heart disease Mother     Hypertension Mother     Hyperlipidemia Mother     Depression Mother     Cancer Mother         uterine    Arrhythmia Mother     Uterine cancer Mother     Hyperlipidemia Father     Heart disease Father     Hypertension Father     Kidney disease Father     Thyroid disease Father     Heart failure Father     Depression Sister     Alcohol abuse Brother     Thyroid disease Paternal Uncle     Lung disease Paternal Uncle     Stroke Maternal Grandmother     Glaucoma Maternal Grandmother     Heart attack Paternal Grandmother     Stroke Paternal Grandmother     Alcohol abuse Paternal Grandfather     Malig Hyperthermia Neg Hx        Review of Systems  Negative except as documented in the HPI.     Allergies  Allergies   Allergen Reactions    Ace Inhibitors Angioedema    Losartan Angioedema     Angioneurotic edema    Seroquel [Quetiapine] Hallucinations    Xanax [Alprazolam] Unknown - High Severity    Amlodipine Swelling    Ativan [Lorazepam] Hallucinations    Baclofen Hallucinations    Minoxidil Confusion    Tetracycline Rash     bliisters in mouth         Medications    Current Facility-Administered Medications:     acetaminophen (TYLENOL) tablet 650 mg, 650 mg, Oral, Q4H PRN **OR** acetaminophen (TYLENOL) 160 MG/5ML oral solution 650 mg, 650 mg, Oral, Q4H PRN **OR** acetaminophen (TYLENOL) suppository 650 mg, 650 mg, Rectal, Q4H PRN, Minal Haley  MD Luna    aspirin EC tablet 81 mg, 81 mg, Oral, Daily, Minal Haley MD, 81 mg at 10/03/23 1901    sennosides-docusate (PERICOLACE) 8.6-50 MG per tablet 2 tablet, 2 tablet, Oral, BID **AND** polyethylene glycol (MIRALAX) packet 17 g, 17 g, Oral, Daily PRN **AND** bisacodyl (DULCOLAX) EC tablet 5 mg, 5 mg, Oral, Daily PRN **AND** bisacodyl (DULCOLAX) suppository 10 mg, 10 mg, Rectal, Daily PRN, Minal Haley MD    brimonidine (ALPHAGAN) 0.2 % ophthalmic solution 1 drop, 1 drop, Both Eyes, BID, Minal Haley MD    bumetanide (BUMEX) tablet 0.5 mg, 0.5 mg, Oral, BID, Minal Haley MD    calcium carbonate (TUMS) chewable tablet 500 mg (200 mg elemental), 2 tablet, Oral, BID PRN, Minal Haley MD    Calcium Replacement - Follow Nurse / BPA Driven Protocol, , Does not apply, PRN, Minal Haley MD    cholecalciferol (VITAMIN D3) tablet 5,000 Units, 5,000 Units, Oral, Q24H, Minal Haley MD, 5,000 Units at 10/03/23 1901    dextrose (D50W) (25 g/50 mL) IV injection 10-50 mL, 10-50 mL, Intravenous, Q15 Min PRN, Minal Haley MD    dextrose (GLUTOSE) oral gel 15 g, 15 g, Oral, Q15 Min PRN, Minal Haley MD    docusate sodium (COLACE) capsule 100 mg, 100 mg, Oral, Daily, Minal Haley MD, 100 mg at 10/03/23 1901    Glucagon (GLUCAGEN) injection 1 mg, 1 mg, Intramuscular, Q15 Min PRN, Minal Haley MD    insulin glargine (LANTUS, SEMGLEE) injection 1-200 Units, 1-200 Units, Subcutaneous, Nightly - Glucommander, Minal Haley MD    insulin lispro (HUMALOG/ADMELOG) injection 1-200 Units, 1-200 Units, Subcutaneous, 4x Daily With Meals & Nightly, Minal Haley MD, 3 Units at 10/03/23 1901    insulin lispro (HUMALOG/ADMELOG) injection 1-200 Units, 1-200 Units, Subcutaneous, PRN, Minal Haley MD    latanoprost (XALATAN) 0.005 % ophthalmic solution 1 drop, 1 drop, Both Eyes, Nightly, Tera,  Minal Carrasco MD    [START ON 10/4/2023] magnesium (as) gluconate (MAGONATE) tablet 27 mg, 27 mg, Oral, Daily, Minal Haley MD    Magnesium Standard Dose Replacement - Follow Nurse / BPA Driven Protocol, , Does not apply, PRN, Minal Haley MD    melatonin tablet 3 mg, 3 mg, Oral, Nightly PRN, Minal Haley MD    ondansetron (ZOFRAN) tablet 4 mg, 4 mg, Oral, Q6H PRN **OR** ondansetron (ZOFRAN) injection 4 mg, 4 mg, Intravenous, Q6H PRN, Minal Haley MD    Pharmacy to Dose Zosyn, , Does not apply, Continuous PRN, Minal Haley MD    Phosphorus Replacement - Follow Nurse / BPA Driven Protocol, , Does not apply, PRN, Minal Haley MD    piperacillin-tazobactam (ZOSYN) 3.375 g in iso-osmotic dextrose 50 ml (premix), 3.375 g, Intravenous, Q8H, Minal Haley MD    Potassium Replacement - Follow Nurse / BPA Driven Protocol, , Does not apply, PRN, Minal Haley MD    sodium chloride 0.9 % flush 10 mL, 10 mL, Intravenous, Q12H, Minal Haley MD    sodium chloride 0.9 % flush 10 mL, 10 mL, Intravenous, PRN, Minal Haley MD    sodium chloride 0.9 % infusion 40 mL, 40 mL, Intravenous, PRN, Minal Haley MD    [START ON 10/4/2023] testosterone (ANDROGEL) gel 25 mg, 25 mg, Transdermal, Daily, Minal Haley MD    traMADol (ULTRAM) tablet 50 mg, 50 mg, Oral, Nightly PRN, Minal Haley MD    Vital Signs  Vitals:    10/03/23 1655   BP: 135/71   Pulse: 80   Resp: 20   Temp: 97.9 °F (36.6 °C)   SpO2: 93%          Physical Exam  Constitutional: Resting comfortably, no acute distress  Neck: Supple, trachea midline  Respiratory: No increased work of breathing, Symmetric excursion  Cardiovascular: Well pefursed, no jugular venous distention evident   Abdominal:  Soft, non-tender, non-distended  Lymphatics: No cervical or suprascapular adenopathy  Skin: Warm, dry, no rash on visualized skin  surfaces  Musculoskeletal: Symmetric strength, no obvious gross abnormalities  Psychiatric: Alert and oriented ×3, normal affect   Perianal: Right perianal and buttock abscess.  Some necrotic tissue present.    Laboratory Results  I have personally reviewed CBC with WBC 12, hemoglobin 16, platelets 271.  INR 1.1.  Procalcitonin 0.9.  Lactate 1.6.  Hemoglobin A1c 8.9.  BMP with creatinine 0.98, albumin 3.2.    Radiology  None to review    Endoscopy  None to review         Scout Villatoro MD  Colorectal & General Surgery  [facility]    [address] [place] [address], [address]  [place], [place], [address]  P: [phone]  F: [phone]

## 2023-10-03 NOTE — ED PROVIDER NOTES
EMERGENCY DEPARTMENT ENCOUNTER    Room Number:  29/29  PCP: Tena Taylor MD  Historian: Patient      HPI:  Chief Complaint: Abscess and urinary retention  A complete HPI/ROS/PMH/PSH/SH/FH are unobtainable due to: None  Context: Dilip Verma is a 73 y.o. male who presents to the ED c/o abscess and urinary retention.  Patient states he has had an abscess in his gluteal cleft for 2 weeks.  Patient been seen by visiting nurses and started on antibiotics.  Has not been drained.  Patient also has been having difficulty with urinary retention.  States he has had this in the past.  Has required [facility] catheterization.  [facility]  Patient states has been having difficulty urinating over the last day or 2.  Has had no fevers or chills.  No vomiting or diarrhea.            PAST MEDICAL HISTORY  Active Ambulatory Problems     Diagnosis Date Noted    Anxiety     Colon polyp     Type 2 diabetes mellitus with hyperglycemia, with long-term current use of insulin     Erectile dysfunction     Hyperlipidemia     Type 2 acute myocardial infarction 07/12/2019    CAD (coronary artery disease) 07/20/2019    Hx of CABG 08/19/2019    Acute on chronic diastolic CHF (congestive heart failure) 09/11/2019    Hypersomnia due to medical condition 09/14/2019    CSA (central sleep apnea) 09/14/2019    Periodic breathing 09/14/2019    HTN (hypertension) 02/26/2020    History of stroke 12/01/2020    CKD (chronic kidney disease) stage 3, GFR 30-59 ml/min 12/01/2020    Urinary retention 12/02/2020    Acute on chronic renal failure 12/03/2020    Acute on chronic diastolic (congestive) heart failure 04/12/2021    Bacteriuria 04/16/2021    Rectal pain 04/16/2021    Status post total replacement of hip 01/11/2022    Vitamin D deficiency 12/29/2022    Post-acute COVID-19 syndrome 12/29/2022    Insulin dose changed 12/29/2022    Arthropathy associated with neurological disorder 12/29/2022    Personal history of colonic polyps  01/30/2023    Type 2 diabetes mellitus with diabetic neuropathy, with long-term current use of insulin 06/05/2023    Cervical spinal stenosis 07/20/2023     Resolved Ambulatory Problems     Diagnosis Date Noted    Orthostasis 07/20/2019    Closed nondisplaced intertrochanteric fracture of left femur 12/01/2020    Acute posthemorrhagic anemia 12/04/2020    Complicated UTI (urinary tract infection) 12/16/2020    Severe uncontrolled hypertension 06/05/2023     Past Medical History:   Diagnosis Date    AMENA (acute kidney injury) 12/03/2020    CORI positive     Anemia     Ataxia     Atypical chest pain 04/19/2022    Balance disorder     Cataract     Cervical radiculopathy 11/11/2019    Cervical spinal cord compression     Chronic diastolic (congestive) heart failure     Chronic kidney disease     Chronic pancreatitis     Closed left subtrochanteric femur fracture 12/01/2020    Colon polyps     Constipation     Contracture, right hand     Coronary artery disease     COVID-19 07/2022    DDD (degenerative disc disease), cervical     Diabetes mellitus, type II     Diabetic retinopathy     Dysphagia     Elevated brain natriuretic peptide (BNP) level 08/2014    Elevated LFTs 03/2021    Foot drop     Fuchs' corneal dystrophy of right eye     Glaucoma     History of alcohol abuse     Hypersomnia     Hypertension     Hypertensive urgency 02/25/2020    Insomnia     Kidney stones     LV dysfunction 06/2016    Lyme disease     Lymphadenopathy syndrome 05/2021    Macular edema     Myocardial infarction 11/05/2019    Myocardial infarction 07/12/2019    Non-celiac gluten sensitivity     Osteoporosis     Oxygen dependent     PAD (peripheral artery disease)     PNA (pneumonia) 06/2016    Polyneuropathy     PTSD (post-traumatic stress disorder)     Pulmonary hypertension     Pulmonary nodule     Senile ectropion of both lower eyelids 02/2022    Sepsis 12/16/2020    Sleep apnea     Spinal stenosis in cervical region     Stroke 2012     Syncope and collapse 07/06/2019         PAST SURGICAL HISTORY  Past Surgical History:   Procedure Laterality Date    CARDIAC CATHETERIZATION N/A 07/15/2019    Procedure: Coronary angiography;  Surgeon: Carrie Price MD;  Location:  [facility];  Service: Cardiovascular    CARDIAC CATHETERIZATION N/A 07/15/2019    Procedure: Left Heart Cath;  Surgeon: Carrie Price MD;  Location:  [facility];  Service: Cardiovascular    CARDIAC CATHETERIZATION N/A 07/15/2019    Procedure: Left ventriculography;  Surgeon: Carrie Price MD;  Location:  [facility];  Service: Cardiovascular    CARDIAC CATHETERIZATION  07/15/2019    Procedure: Functional Flow Turin;  Surgeon: Carrie Price MD;  Location:  [facility];  Service: Cardiovascular    CARDIAC CATHETERIZATION N/A 11/06/2019    Procedure: Right and Left Heart Cath;  Surgeon: Mya Smith MD;  Location[facility];  Service: Cardiovascular    CARDIAC CATHETERIZATION N/A 11/06/2019    Procedure: Coronary angiography;  Surgeon: Mya Smith MD;  [facility];  Service: Cardiovascular    CARDIAC SURGERY      CATARACT EXTRACTION Left 2014    CATARACT EXTRACTION Right 11/2016    PHACO/IOL, DR. LEN DENTON    COLONOSCOPY N/A 03/16/2023    ENTIRE COLON WNL, RESCOPE IN 5 YRS, DR. LINDA LIZARRAGA AT [facility]    COLONOSCOPY W/ POLYPECTOMY N/A 01/02/2015    A FEW DIVERTICULA IN SIGMOID, 6 MM TUBULOVILLOUS ADENOMA POLYP IN RECTUM, SMALL HEMORRHOIDS, MELANOSIS COLI, DR. AVTAR JEONG AT [facility]    CORONARY ARTERY BYPASS GRAFT N/A 07/18/2019    Procedure: INTRAOPERATIVE SHOAIB; STERNOTOMY CORONARY ARTERY BYPASS x 3  USING LEFT INTERNAL MAMMARY ARTERY GRAFT UTILIZING ENDOSCOPICALLY HARVESTED RIGHT GREATER SAPHENOUS VEIN AND PRP.;  Surgeon: Bill Devi MD;  Location: [facility] OR;  Service: Cardiothoracic    CYSTOSCOPY BLADDER STONE LITHOTRIPSY N/A      DENTAL PROCEDURE Bilateral     3 surgeries inder implants    FEMUR IM NAILING/RODDING Left 2020    Procedure: LEFT HIP INTRAMEDULLARY NAIL;  Surgeon: Niraj Ravi MD;  Location: [facility] OR;  Service: Orthopedic Spine;  Laterality: Left;    INGUINAL HERNIA REPAIR Bilateral     TOENAIL EXCISION  2022    TONSILLECTOMY Bilateral     TOTAL HIP ARTHROPLASTY REVISION Left 2022    Procedure: TOTAL HIP ARTHROPLASTY REVISION- POSTERIOR;  Surgeon: Jurgen Candelaria II, MD;  Location: [facility] OR;  Service: Orthopedics;  Laterality: Left;    VASECTOMY N/A          FAMILY HISTORY  Family History   Problem Relation Age of Onset    Heart disease Mother     Hypertension Mother     Hyperlipidemia Mother     Depression Mother     Cancer Mother         uterine    Arrhythmia Mother     Uterine cancer Mother     Hyperlipidemia Father     Heart disease Father     Hypertension Father     Kidney disease Father     Thyroid disease Father     Heart failure Father     Depression Sister     Alcohol abuse Brother     Thyroid disease Paternal Uncle     Lung disease Paternal Uncle     Stroke Maternal Grandmother     Glaucoma Maternal Grandmother     Heart attack Paternal Grandmother     Stroke Paternal Grandmother     Alcohol abuse Paternal Grandfather     Malig Hyperthermia Neg Hx          SOCIAL HISTORY  Social History     Socioeconomic History    Marital status:    Tobacco Use    Smoking status: Former     Packs/day: 0.75     Years: 16.00     Pack years: 12.00     Types: Cigarettes     Quit date:      Years since quittin.7     Passive exposure: Past    Smokeless tobacco: Never    Tobacco comments:     Start age:39/Stopping age:47, 3/4 PPD   Vaping Use    Vaping Use: Never used   Substance and Sexual Activity    Alcohol use: Not Currently     Comment: alcohol abuse, none x 25 years / caffeine use - 1 cup daily    Drug use: No    Sexual activity: Defer         ALLERGIES  Ace inhibitors, Losartan,  Seroquel [quetiapine], Xanax [alprazolam], Amlodipine, Ativan [lorazepam], Baclofen, Minoxidil, and Tetracycline        REVIEW OF SYSTEMS  Review of Systems   Abscess, urinary retention      PHYSICAL EXAM  ED Triage Vitals [10/03/23 1148]   Temp Heart Rate Resp BP SpO2   98.5 °F (36.9 °C) 89 18 154/65 94 %      Temp src Heart Rate Source Patient Position BP Location FiO2 (%)   -- -- -- -- --       Physical Exam      GENERAL: Patient is awake and chronically ill.  HENT: nares patent  EYES: no scleral icterus  CV: regular rhythm, normal rate  RESPIRATORY: normal effort  ABDOMEN: soft.  Mild lower abdomen tenderness  MUSCULOSKELETAL: no deformity  NEURO: alert, moves all extremities, follows commands.  Has weakness in both lower extremities  PSYCH:  calm, cooperative  SKIN: warm, dry.  Cellulitis to right gluteal cleft.  There is an open area that appears to have drained.  There is no fluctuance.    Vital signs and nursing notes reviewed.          LAB RESULTS  Recent Results (from the past 24 hour(s))   Comprehensive Metabolic Panel    Collection Time: 10/03/23 12:45 PM    Specimen: Blood   Result Value Ref Range    Glucose 263 (H) 65 - 99 mg/dL    BUN 12 8 - 23 mg/dL    Creatinine 0.98 0.76 - 1.27 mg/dL    Sodium 135 (L) 136 - 145 mmol/L    Potassium 4.3 3.5 - 5.2 mmol/L    Chloride 92 (L) 98 - 107 mmol/L    CO2 28.5 22.0 - 29.0 mmol/L    Calcium 9.3 8.6 - 10.5 mg/dL    Total Protein 7.2 6.0 - 8.5 g/dL    Albumin 3.2 (L) 3.5 - 5.2 g/dL    ALT (SGPT) 23 1 - 41 U/L    AST (SGOT) 11 1 - 40 U/L    Alkaline Phosphatase 73 39 - 117 U/L    Total Bilirubin 0.6 0.0 - 1.2 mg/dL    Globulin 4.0 gm/dL    A/G Ratio 0.8 g/dL    BUN/Creatinine Ratio 12.2 7.0 - 25.0    Anion Gap 14.5 5.0 - 15.0 mmol/L    eGFR 80.9 >60.0 mL/min/1.73   CBC Auto Differential    Collection Time: 10/03/23 12:45 PM    Specimen: Blood   Result Value Ref Range    WBC 12.39 (H) 3.40 - 10.80 10*3/mm3    RBC 5.18 4.14 - 5.80 10*6/mm3    Hemoglobin 16.2 13.0 -  17.7 g/dL    Hematocrit 48.2 37.5 - 51.0 %    MCV 93.1 79.0 - 97.0 fL    MCH 31.3 26.6 - 33.0 pg    MCHC 33.6 31.5 - 35.7 g/dL    RDW 12.2 (L) 12.3 - 15.4 %    RDW-SD 41.7 37.0 - 54.0 fl    MPV 10.3 6.0 - 12.0 fL    Platelets 271 140 - 450 10*3/mm3    Neutrophil % 87.6 (H) 42.7 - 76.0 %    Lymphocyte % 6.0 (L) 19.6 - 45.3 %    Monocyte % 4.8 (L) 5.0 - 12.0 %    Eosinophil % 0.4 0.3 - 6.2 %    Basophil % 0.3 0.0 - 1.5 %    Immature Grans % 0.9 (H) 0.0 - 0.5 %    Neutrophils, Absolute 10.85 (H) 1.70 - 7.00 10*3/mm3    Lymphocytes, Absolute 0.74 0.70 - 3.10 10*3/mm3    Monocytes, Absolute 0.60 0.10 - 0.90 10*3/mm3    Eosinophils, Absolute 0.05 0.00 - 0.40 10*3/mm3    Basophils, Absolute 0.04 0.00 - 0.20 10*3/mm3    Immature Grans, Absolute 0.11 (H) 0.00 - 0.05 10*3/mm3    nRBC 0.0 0.0 - 0.2 /100 WBC   Urinalysis With Culture If Indicated - Indwelling Urethral Catheter    Collection Time: 10/03/23  1:41 PM    Specimen: Indwelling Urethral Catheter; Urine   Result Value Ref Range    Color, UA Yellow Yellow, Straw    Appearance, UA Clear Clear    pH, UA 5.5 5.0 - 8.0    Specific Gravity, UA 1.017 1.005 - 1.030    Glucose,  mg/dL (2+) (A) Negative    Ketones, UA 40 mg/dL (2+) (A) Negative    Bilirubin, UA Negative Negative    Blood, UA Negative Negative    Protein, UA Trace (A) Negative    Leuk Esterase, UA Negative Negative    Nitrite, UA Negative Negative    Urobilinogen, UA 1.0 E.U./dL 0.2 - 1.0 E.U./dL       Ordered the above labs and reviewed the results.        RADIOLOGY  No Radiology Exams Resulted Within Past 24 Hours            PROCEDURES  Procedures          MEDICATIONS GIVEN IN ER  Medications   piperacillin-tazobactam (ZOSYN) 3.375 g in iso-osmotic dextrose 50 ml (premix) (3.375 g Intravenous New Bag 10/3/23 0915)                   MEDICAL DECISION MAKING, PROGRESS, and CONSULTS     Discussion below represents my analysis of pertinent findings related to patient's condition, differential diagnosis,  treatment plan and final disposition.      Additional sources:  - Discussed/ obtained information from independent historians: None    - External (non-ED) record review: Epic reviewed and patient seen by primary provider 10/1/2023 for diabetes and kidney disease    - Chronic or social conditions impacting care: None    - Shared decision making: None      Orders placed during this visit:  Orders Placed This Encounter   Procedures    Comprehensive Metabolic Panel    Urinalysis With Culture If Indicated - Urine, Catheter    CBC Auto Differential    Insert Indwelling Urinary Catheter    Assess Need for Indwelling Urinary Catheter - Follow Removal Protocol    Urinary Catheter Care    [facility] (on-call MD unless specified) Details    Initiate Observation Status    CBC & Differential         Additional orders considered but not ordered:  None        Differential diagnosis includes but is not limited to:    Abscess versus cellulitis, urinary retention hopefully from prostate enlargement and not from lumbar stenosis      Independent interpretation of labs, radiology studies, and discussions with consultants:  ED Course as of 10/03/23 1525   Tue Oct 03, 2023   1435 14:35 EDT  Patient with 2 areas of concern.  Patient does have urinary retention.  Has had problems with lumbar stenosis in the past.  Has seen Geovany Woodson.  Concerning to have urinary retention in the setting.  Has not had his complete work-up for his back as well.  Patient had over 1000 cc of urine in his bladder.  Patient also has abscess with necrotic center.  Has been draining and still has very indurated area around it.  Most likely cellulitis.  Patient will be started on antibiotics.  Discussed with Dr. Haley and will be admitted.  Surgery consult for abscess.  May need MRI for low back. [SL]      ED Course User Index  [SL] Andre Zheng MD                 DIAGNOSIS  Final diagnoses:   Acute urinary retention   Spinal stenosis, unspecified spinal region    Gluteal abscess         DISPOSITION  admit            Latest Documented Vital Signs:  As of 15:25 EDT  BP- (!) 183/89 HR- 86 Temp- 98.5 °F (36.9 °C) O2 sat- 93%              --    Please note that portions of this were completed with a voice recognition program.       Note Disclaimer: At [facility], we believe that sharing information builds trust and better relationships. You are receiving this note because you are receiving care at [facility] or recently visited. It is possible you will see health information before a provider has talked with you about it. This kind of information can be easy to misunderstand. To help you fully understand what it means for your health, we urge you to discuss this note with your provider.            Andre Zheng MD  10/03/23 1522

## 2023-10-03 NOTE — CONSULTS
[facility] NEUROSURGERY CONSULT NOTE    Patient name: Dilip Verma  Referring Provider: Minal Haley MD   Reason for Consultation: Urinary retention, history of lumbar stenosis    Patient Care Team:  Tena Taylor MD as PCP - General (Family Medicine)  Morris Cai MD as Consulting Physician (Sleep Medicine)  Michaela Hylton MD as Consulting Physician (Cardiology)  Hardy Banerjee MD as Consulting Physician (Ophthalmology)  Chula Christianson MD as Consulting Physician (Ophthalmology)  Jason Sherman MD (Endocrinology)  Perla Mayen MD as Consulting Physician (Nephrology)  Federico Hebert MD as Consulting Physician (Pulmonary Disease)  Niraj Ravi MD as Consulting Physician (Orthopedic Surgery)  Papa Velasco MD as Consulting Physician (Urology)  Terese Yost MD as Consulting Physician (Gastroenterology)  Aracelis Ball MD as Consulting Physician (Colon and Rectal Surgery)  [facility] as Primary Care Provider    Chief complaint: Difficulty urinating for past 4 days    Subjective .     History of present illness:    Patient is a 73 y.o.  male with coronary artery disease, heart failure, hypertension, history of stroke, chronic kidney disease, type 2 diabetes with chronic cervical and lumbar spinal stenosis.  Patient has chronic bilateral foot drop (for 8 years )as well as chronic right hand contracture (for 4 years) as well as chronic peripheral neuropathy.  He states that he developed difficulty urinating 4 days ago.  The patient  relates this with starting Farxiga but the patient has been on that for well over a few months now.  He denies any new symptoms in his arms or legs such as pain, numbness, tingling or weakness.  He does have weakness and numbness in the upper and lower extremities however all of his symptoms are baseline.  Only new symptom is urinary retention.  He denies saddle anesthesia or bowel incontinence.  He does have a  recent history of diarrhea that is associated with antibiotic usage.    Review of Systems  Review of Systems   Genitourinary:  Positive for difficulty urinating.   Musculoskeletal:  Positive for back pain (Chronic and unchanged).   Neurological:  Positive for weakness (Chronic and unchanged) and numbness (Chronic and unchanged).     History  PAST MEDICAL HISTORY  Past Medical History:   Diagnosis Date    AMENA (acute kidney injury) 12/03/2020    CORI positive     Anemia     Anxiety     Ataxia     Atypical chest pain 04/19/2022    SEEN AT [facility] ER    Balance disorder     Cataract     BILATERAL, S/P EXTRACTION    Cervical radiculopathy 11/11/2019    SEEN AT  [facility] ER    Cervical spinal cord compression     Chronic diastolic (congestive) heart failure     Chronic kidney disease     STAGE 3, FOLLOWED BY DR. VIVAR    Chronic pancreatitis     Closed left subtrochanteric femur fracture 12/01/2020    ADMITTED TO [facility]    Closed nondisplaced intertrochanteric fracture of left femur 12/01/2020    ADMITTED TO [facility]    Colon polyps     FOLLOWED BY DR. AVTAR JEONG    Constipation     Contracture, right hand     Coronary artery disease     CABG 7/2019    COVID-19 07/2022    DDD (degenerative disc disease), cervical     Diabetes mellitus, type II     IDDM    Diabetic retinopathy     Dysphagia     Elevated brain natriuretic peptide (BNP) level 08/2014    Elevated LFTs 03/2021    Erectile dysfunction     Foot drop     Fuchs' corneal dystrophy of right eye     Glaucoma     BILATERAL    History of alcohol abuse     Hyperlipidemia     Hypersomnia     Hypertension     Hypertensive urgency 02/25/2020    ADMITTED TO [facility]    Insomnia     Kidney stones     LV dysfunction 06/2016    Lyme disease     Lymphadenopathy syndrome 05/2021    Macular edema     BILATERAL    Myocardial infarction 11/05/2019    NSTEMI, ADMITTED TO [facility]    Myocardial infarction 07/12/2019    NSTEMI, ADMITTED TO [facility]    Non-celiac gluten sensitivity     Orthostasis     Osteoporosis   "   Oxygen dependent     PAD (peripheral artery disease)     PNA (pneumonia) 06/2016    LEFT LOBE    Polyneuropathy     PTSD (post-traumatic stress disorder)     Pulmonary hypertension     Pulmonary nodule     Senile ectropion of both lower eyelids 02/2022    Sepsis 12/16/2020    D/T UTI, ADMITTED TO [facility]    Sleep apnea     STATES DOESN'T USE BIPAP OR CPAP    Spinal stenosis in cervical region     SEVERE-LIMITED ROM    Stroke 2012    \"slight stroke\"    Syncope and collapse 07/06/2019    ADMITTED TO [place]    Urinary retention 04/05/2021    SEEN AT [facility] ER    Vitamin D deficiency        PAST SURGICAL HISTORY  Past Surgical History:   Procedure Laterality Date    CARDIAC CATHETERIZATION N/A 07/15/2019    Procedure: Coronary angiography;  Surgeon: Carrie Price MD;  Location:  [facility];  Service: Cardiovascular    CARDIAC CATHETERIZATION N/A 07/15/2019    Procedure: Left Heart Cath;  Surgeon: Carrie Price MD;  Location:  [facility];  Service: Cardiovascular    CARDIAC CATHETERIZATION N/A 07/15/2019    Procedure: Left ventriculography;  Surgeon: Carrie Price MD;  Location:  [facility];  Service: Cardiovascular    CARDIAC CATHETERIZATION  07/15/2019    Procedure: Functional Flow [place];  Surgeon: Carrie Price MD;  Location:  [facility];  Service: Cardiovascular    CARDIAC CATHETERIZATION N/A 11/06/2019    Procedure: Right and Left Heart Cath;  Surgeon: Mya Smith MD;  Location:  [facility];  Service: Cardiovascular    CARDIAC CATHETERIZATION N/A 11/06/2019    Procedure: Coronary angiography;  Surgeon: Mya Smith MD;  Location:  [facility];  Service: Cardiovascular    CARDIAC SURGERY      CATARACT EXTRACTION Left 2014    CATARACT EXTRACTION Right 11/2016    PHACO/IOL, DR. LEN DENTON    COLONOSCOPY N/A 03/16/2023    ENTIRE COLON WNL, RESCOPE IN 5 YRS, DR. LINDA LIZARRAGA AT [facility]    COLONOSCOPY " W/ POLYPECTOMY N/A 01/02/2015    A FEW DIVERTICULA IN SIGMOID, 6 MM TUBULOVILLOUS ADENOMA POLYP IN RECTUM, SMALL HEMORRHOIDS, MELANOSIS COLI, DR. AVTAR JEONG AT [facility]    CORONARY ARTERY BYPASS GRAFT N/A 07/18/2019    Procedure: INTRAOPERATIVE SHOAIB; STERNOTOMY CORONARY ARTERY BYPASS x 3  USING LEFT INTERNAL MAMMARY ARTERY GRAFT UTILIZING ENDOSCOPICALLY HARVESTED RIGHT GREATER SAPHENOUS VEIN AND PRP.;  Surgeon: Bill Devi MD;  Location: [facility] OR;  Service: Cardiothoracic    CYSTOSCOPY BLADDER STONE LITHOTRIPSY N/A     DENTAL PROCEDURE Bilateral     3 surgeries inder implants    FEMUR IM NAILING/RODDING Left 12/03/2020    Procedure: LEFT HIP INTRAMEDULLARY NAIL;  Surgeon: Niraj Ravi MD;  Location: [facility] OR;  Service: Orthopedic Spine;  Laterality: Left;    INGUINAL HERNIA REPAIR Bilateral     TOENAIL EXCISION  06/2022    TONSILLECTOMY Bilateral     TOTAL HIP ARTHROPLASTY REVISION Left 01/11/2022    Procedure: TOTAL HIP ARTHROPLASTY REVISION- POSTERIOR;  Surgeon: Jurgen Candelaria II, MD;  Location: [facility] OR;  Service: Orthopedics;  Laterality: Left;    VASECTOMY N/A        FAMILY HISTORY  Family History   Problem Relation Age of Onset    Heart disease Mother     Hypertension Mother     Hyperlipidemia Mother     Depression Mother     Cancer Mother         uterine    Arrhythmia Mother     Uterine cancer Mother     Hyperlipidemia Father     Heart disease Father     Hypertension Father     Kidney disease Father     Thyroid disease Father     Heart failure Father     Depression Sister     Alcohol abuse Brother     Thyroid disease Paternal Uncle     Lung disease Paternal Uncle     Stroke Maternal Grandmother     Glaucoma Maternal Grandmother     Heart attack Paternal Grandmother     Stroke Paternal Grandmother     Alcohol abuse Paternal Grandfather     Malig Hyperthermia Neg Hx        SOCIAL HISTORY  Social History     Tobacco Use    Smoking status: Former     Packs/day:  0.75     Years: 16.00     Pack years: 12.00     Types: Cigarettes     Quit date:      Years since quittin.7     Passive exposure: Past    Smokeless tobacco: Never    Tobacco comments:     Start age:39/Stopping age:47, 3/4 PPD   Vaping Use    Vaping Use: Never used   Substance Use Topics    Alcohol use: Not Currently     Comment: alcohol abuse, none x 25 years / caffeine use - 1 cup daily    Drug use: No         Allergies:  Ace inhibitors, Losartan, Seroquel [quetiapine], Xanax [alprazolam], Amlodipine, Ativan [lorazepam], Baclofen, Minoxidil, and Tetracycline    MEDICATIONS:  (Not in a hospital admission)      Objective     Results Review:  LABS:    Admission on 10/03/2023   Component Date Value Ref Range Status    Glucose 10/03/2023 263 (H)  65 - 99 mg/dL Final    BUN 10/03/2023 12  8 - 23 mg/dL Final    Creatinine 10/03/2023 0.98  0.76 - 1.27 mg/dL Final    Sodium 10/03/2023 135 (L)  136 - 145 mmol/L Final    Potassium 10/03/2023 4.3  3.5 - 5.2 mmol/L Final    Chloride 10/03/2023 92 (L)  98 - 107 mmol/L Final    CO2 10/03/2023 28.5  22.0 - 29.0 mmol/L Final    Calcium 10/03/2023 9.3  8.6 - 10.5 mg/dL Final    Total Protein 10/03/2023 7.2  6.0 - 8.5 g/dL Final    Albumin 10/03/2023 3.2 (L)  3.5 - 5.2 g/dL Final    ALT (SGPT) 10/03/2023 23  1 - 41 U/L Final    AST (SGOT) 10/03/2023 11  1 - 40 U/L Final    Alkaline Phosphatase 10/03/2023 73  39 - 117 U/L Final    Total Bilirubin 10/03/2023 0.6  0.0 - 1.2 mg/dL Final    Globulin 10/03/2023 4.0  gm/dL Final    A/G Ratio 10/03/2023 0.8  g/dL Final    BUN/Creatinine Ratio 10/03/2023 12.2  7.0 - 25.0 Final    Anion Gap 10/03/2023 14.5  5.0 - 15.0 mmol/L Final    eGFR 10/03/2023 80.9  >60.0 mL/min/1.73 Final    Color, UA 10/03/2023 Yellow  Yellow, Straw Final    Appearance, UA 10/03/2023 Clear  Clear Final    pH, UA 10/03/2023 5.5  5.0 - 8.0 Final    Specific Gravity, UA 10/03/2023 1.017  1.005 - 1.030 Final    Glucose, UA 10/03/2023 500 mg/dL (2+) (A)  Negative  Final    Ketones, UA 10/03/2023 40 mg/dL (2+) (A)  Negative Final    Bilirubin, UA 10/03/2023 Negative  Negative Final    Blood, UA 10/03/2023 Negative  Negative Final    Protein, UA 10/03/2023 Trace (A)  Negative Final    Leuk Esterase, UA 10/03/2023 Negative  Negative Final    Nitrite, UA 10/03/2023 Negative  Negative Final    Urobilinogen, UA 10/03/2023 1.0 E.U./dL  0.2 - 1.0 E.U./dL Final    WBC 10/03/2023 12.39 (H)  3.40 - 10.80 10*3/mm3 Final    RBC 10/03/2023 5.18  4.14 - 5.80 10*6/mm3 Final    Hemoglobin 10/03/2023 16.2  13.0 - 17.7 g/dL Final    Hematocrit 10/03/2023 48.2  37.5 - 51.0 % Final    MCV 10/03/2023 93.1  79.0 - 97.0 fL Final    MCH 10/03/2023 31.3  26.6 - 33.0 pg Final    MCHC 10/03/2023 33.6  31.5 - 35.7 g/dL Final    RDW 10/03/2023 12.2 (L)  12.3 - 15.4 % Final    RDW-SD 10/03/2023 41.7  37.0 - 54.0 fl Final    MPV 10/03/2023 10.3  6.0 - 12.0 fL Final    Platelets 10/03/2023 271  140 - 450 10*3/mm3 Final    Neutrophil % 10/03/2023 87.6 (H)  42.7 - 76.0 % Final    Lymphocyte % 10/03/2023 6.0 (L)  19.6 - 45.3 % Final    Monocyte % 10/03/2023 4.8 (L)  5.0 - 12.0 % Final    Eosinophil % 10/03/2023 0.4  0.3 - 6.2 % Final    Basophil % 10/03/2023 0.3  0.0 - 1.5 % Final    Immature Grans % 10/03/2023 0.9 (H)  0.0 - 0.5 % Final    Neutrophils, Absolute 10/03/2023 10.85 (H)  1.70 - 7.00 10*3/mm3 Final    Lymphocytes, Absolute 10/03/2023 0.74  0.70 - 3.10 10*3/mm3 Final    Monocytes, Absolute 10/03/2023 0.60  0.10 - 0.90 10*3/mm3 Final    Eosinophils, Absolute 10/03/2023 0.05  0.00 - 0.40 10*3/mm3 Final    Basophils, Absolute 10/03/2023 0.04  0.00 - 0.20 10*3/mm3 Final    Immature Grans, Absolute 10/03/2023 0.11 (H)  0.00 - 0.05 10*3/mm3 Final    nRBC 10/03/2023 0.0  0.0 - 0.2 /100 WBC Final       DIAGNOSTICS:  No new neuroimaging    MRI lumbar and cervical spine without contrast 7/11/2023:  FINDINGS:     At C2-3, there is mild left foraminal narrowing secondary to facet  hypertrophic change. There is  no significant degree of right foraminal  narrowing. There is a disc bulge which mildly indents the ventral  subarachnoid space.     At C3-4, there are advanced degenerative disc changes. There is  prominent degenerative endplate marrow edema at this site. There is a  disc osteophyte complex that results in a moderate-to-severe degree of  central canal stenosis. There is prominent cord compression at the C3-4  level. Just cephalad to the C3-4 level, there is subtle T2  hyperintensity within the cervical spinal cord that is felt to probably  be representative of edema or myelomalacia related to a compressive  cervical spondyloarthropathy. Typically these cord signal changes occur  just inferior to the disc space level. Nonetheless, given the prominent  cord compression at C3-4, I suspect that this signal abnormality is  related to the cord compression. There is moderate right and severe left  foraminal narrowing secondary to uncovertebral joint hypertrophy.     At C4-5, there is a disc osteophyte complex which results in a  moderate-to-severe degree of canal stenosis. Again, there is prominent  cord compression at C4-5 although the findings are less prominent at the  C4-5 level as compared to the C3-4 level. Prominent degenerative disc  changes are again appreciated. There is severe bilateral foraminal  narrowing secondary to uncovertebral joint hypertrophy.     At C5-6, there is a disc osteophyte complex which results in a  moderate-to-severe degree of canal stenosis and again there is prominent  cord compression. There is severe bilateral foraminal narrowing  secondary to uncovertebral joint hypertrophy.     At C6-7, there is a disc osteophyte complex and a small central disc  protrusion which result in a moderate degree of canal compromise. There  is severe bilateral foraminal narrowing secondary to uncovertebral joint  hypertrophy.     At C7-T1, there is moderate right foraminal narrowing secondary to  a  combination of a disc osteophyte complex and facet hypertrophic change.  There is no significant degree of left foraminal narrowing. Minimal  canal narrowing is seen secondary to a disc osteophyte complex.     The visualized contents of the cranial vault are unremarkable.     IMPRESSION:     Multilevel canal narrowing is appreciated within the cervical spine and  this includes moderate-to-severe C3-4, moderate-to-severe C4-5,  moderate-to-severe C5-6, and moderate C6-7 canal stenosis. The canal  stenosis is most prominently seen at the C3-4 and C5-6 levels where  there is prominent cord compression. Just cephalad to the level of the  C3-4 disc space, there is subtle T2 hyperintensity within the cervical  spinal cord that is most suggestive of edema or myelomalacia related to  a compressive cervical spondyloarthropathy.     Additionally, there is multilevel foraminal stenosis within the cervical  spine. This includes mild left C2-3, moderate right C3-4, severe left  C3-4, severe bilateral C4-5, severe bilateral C5-6, severe bilateral  C6-7, and moderate right C7-T1 foraminal stenosis.        TECHNIQUE: MRI of the lumbar spine was obtained with sagittal T1,  proton-density, and T2-weighted images. Additionally, there are axial T1  and T2-weighted images through the lumbar spine.     FINDINGS:     The conus medullaris terminates at the level of the upper portion of the  L2 vertebral body and has normal signal intensity. The visualized distal  thoracic spinal cord is unremarkable in appearance.     At L1-2, there is degenerative retrolisthesis of L1 on L2 by  approximately 7 mm. There is a mild degree of canal and bilateral  foraminal narrowing secondary to bulging disc material.     At L2-3, there is degenerative retrolisthesis of L2 on L3 by  approximately 7 mm. There are advanced degenerative disc changes. There  is a moderate degree of bilateral foraminal narrowing secondary to loss  of foraminal height, a disc  osteophyte complex, and facet arthropathy.  There is a mild-to-moderate degree of central canal stenosis secondary  to a disc osteophyte complex and facet hypertrophic change.     At L3-4, there is advanced degenerative disc change. Adjacent  degenerative endplate marrow changes are noted. There is degenerative  retrolisthesis of L3 on L4 by approximately 4 mm. There is a moderate  degree of left foraminal narrowing secondary to loss of foraminal height  and bulging disc material. There is moderate-to-severe right foraminal  narrowing secondary to bulging disc material, loss of foraminal height,  and facet arthropathy. Additionally, there is a small-to-moderate-sized  right subarticular disc protrusion which is within the position of  causing mass effect upon the descending right L4 nerve root. There is a  moderate-to-severe degree of central canal stenosis secondary to bulging  disc material, ligamentum flavum thickening, and the aforementioned disc  protrusion.     At L4-5, there are moderate facet hypertrophic changes. There is a  moderate degree of right foraminal narrowing secondary to bulging disc  material and facet hypertrophic change and there is a mild-to-moderate  degree of left foraminal narrowing secondary to similar features.  Minimal canal narrowing is seen secondary to the facet hypertrophy and  mild ligamentum flavum thickening.     At L5-S1, there are advanced degenerative disc changes. There is  degenerative retrolisthesis of L5 on S1 by approximately 4 mm. There is  moderate right and mild left facet hypertrophic change. There is a small  central disc protrusion in addition to a disc bulge which mildly indent  the ventral subarachnoid space. There is moderate left and mild right  foraminal narrowing secondary to a disc osteophyte complex and facet  hypertrophy.     IMPRESSION:     Multilevel degenerative phenomena are appreciated within the lumbar  spine resulting in multilevel canal and  foraminal stenotic changes. The  most prominent canal stenosis is appreciated at the L3-4 level where  there is a moderate-to-severe degree of central canal stenosis secondary  to bulging disc material, a right subarticular disc protrusion, and  ligamentum flavum thickening. Additionally, this disc protrusion is  within the position of causing mass effect upon the descending right L4  nerve root. Also, there is a mild-to-moderate degree of central canal  stenosis at the L2-3 level secondary to a disc osteophyte complex and  facet hypertrophic change. Multilevel foraminal stenotic changes are  noted within the lumbar spine and most prominently at L2-3, L3-4, L4-5,  and L5-S1. Again, there is moderate left L5-S1, moderate right L4-5,  moderate-to-severe right L3-4, moderate left L3-4, and moderate  bilateral L2-3 foraminal narrowing.             Results Review:   I reviewed the patient's new clinical results.  I personally viewed and interpreted the patient's labs, medications and chart    Vital Signs   Temp:  [98.5 °F (36.9 °C)] 98.5 °F (36.9 °C)  Heart Rate:  [82-91] 86  Resp:  [18] 18  BP: (109-183)/(65-97) 183/89    Physical Exam:  Physical Exam  Vitals reviewed.   Constitutional:       General: He is not in acute distress.     Appearance: Normal appearance. He is not ill-appearing, toxic-appearing or diaphoretic.   HENT:      Head: Normocephalic.      Nose: Nose normal.      Mouth/Throat:      Mouth: Mucous membranes are moist.      Pharynx: Oropharynx is clear.   Eyes:      Extraocular Movements: Extraocular movements intact.      Conjunctiva/sclera: Conjunctivae normal.   Cardiovascular:      Rate and Rhythm: Normal rate.   Pulmonary:      Effort: Pulmonary effort is normal.   Musculoskeletal:      Cervical back: Normal range of motion.   Skin:     General: Skin is warm.   Neurological:      Mental Status: He is alert and oriented to person, place, and time.      Deep Tendon Reflexes:      Reflex Scores:        "Tricep reflexes are 2+ on the right side and 2+ on the left side.       Bicep reflexes are 2+ on the right side and 2+ on the left side.       Brachioradialis reflexes are 2+ on the right side and 2+ on the left side.       Patellar reflexes are 1+ on the right side and 1+ on the left side.       Achilles reflexes are 1+ on the right side and 1+ on the left side.  Psychiatric:         Mood and Affect: Mood normal.         Speech: Speech normal.         Behavior: Behavior normal.         Thought Content: Thought content normal.         Judgment: Judgment normal.     Neurologic Exam     Mental Status   Oriented to person, place, and time.   Attention: normal. Concentration: normal.   Speech: speech is normal   Level of consciousness: alert  Knowledge: consistent with education.     Motor Exam   Overall muscle tone: normal  Right arm tone: normal  Left arm tone: decreased    Strength   Right deltoid: 4/5  Left deltoid: 4/5  Right biceps: 4/5  Left biceps: 4/5  Right triceps: 4/5  Left triceps: 4/5  Right wrist flexion: 3/5  Left wrist flexion: 4/5  Right wrist extension: 3/5  Left wrist extension: 4/5  Right iliopsoas: 5/5  Left iliopsoas: 5/5  Right quadriceps: 5/5  Left quadriceps: 5/5  Right hamstrin/5  Left hamstrin/5  Right anterior tibial: 3/5  Left anterior tibial: 3/5  Right posterior tibial: 5/5  Left posterior tibial: 5/5  Right gastroc: 5/5  Left gastroc: 5/5Patient has chronic foot drop for \"past 7 to 8 years\"    Patient has contracture of right hand chronic and relatively unchanged for past 4 years per patient     Sensory Exam   Baseline neuropathy in distal bilateral lower extremities-unchanged per patient     Gait, Coordination, and Reflexes     Gait  Gait: (Gait deferred)    Reflexes   Right brachioradialis: 2+  Left brachioradialis: 2+  Right biceps: 2+  Left biceps: 2+  Right triceps: 2+  Left triceps: 2+  Right patellar: 1+  Left patellar: 1+  Right achilles: 1+  Left achilles: 1+  Right " Medina: absent  Left Medina: absent  Right ankle clonus: absent  Left ankle clonus: absent    Assessment & Plan       Urinary retention    CAD (coronary artery disease)    Hx of CABG    Chronic diastolic heart failure    CSA (central sleep apnea)    HTN (hypertension)    History of stroke    CKD (chronic kidney disease) stage 3, GFR 30-59 ml/min    Type 2 diabetes mellitus with diabetic neuropathy, with long-term current use of insulin    Cervical stenosis of spine    Spinal stenosis, lumbar region, without neurogenic claudication    Patient with chronic cervical and lumbar spinal stenosis with 4 days of urinary retention as well as gluteal abscess.  The patient apparently has had issues with urinary retention in the past and has required [facility]ization and [facility]  At this point, he denies any new symptoms in his bilateral arms or legs such as new pain, weakness, numbness, tingling.  He also denies saddle anesthesia.  He reports an episode of diarrhea a few days ago associated with antibiotic usage but last bowel movement was 2 days ago.  His only new complaint currently is urinary retention.  Previous MRI of cervical and lumbar spine from July of this year shows multiple levels of severe canal stenosis in the cervical spine as well as some significant lumbar stenosis. Neurosurgery will let urology evaluate him currently to see if there are any other issues that could relate to his urinary retention.  Neurosurgery will check back tomorrow and decide whether MRI of the cervical and/or lumbar spine is warranted.       PLAN:     Urology consult  Neurosurgery to check back tomorrow    I discussed the patient's findings and my recommendations with patient and Dr. Solis.    During patient visit, I utilized appropriate personal protective equipment including mask and gloves.  Mask used was standard procedure mask. Appropriate PPE was worn during the entire visit.  Hand hygiene was completed  "before and after.     Herbie Christie, APRN  10/03/23  16:15 EDT    \"Dictated utilizing Dragon dictation\".    "

## 2023-10-03 NOTE — ED NOTES
Nursing report ED to floor  Dilip Verma  73 y.o.  male    HPI :   Chief Complaint   Patient presents with    Abscess           Urinary Retention              Admitting doctor:   Minal Haley MD    Admitting diagnosis:   The primary encounter diagnosis was Acute urinary retention. Diagnoses of Spinal stenosis, unspecified spinal region and Gluteal abscess were also pertinent to this visit.    Code status:   Current Code Status       Date Active Code Status Order ID Comments User Context       Prior            Allergies:   Ace inhibitors, Losartan, Seroquel [quetiapine], Xanax [alprazolam], Amlodipine, Ativan [lorazepam], Baclofen, Minoxidil, and Tetracycline    Isolation:   No active isolations    Intake and Output    Intake/Output Summary (Last 24 hours) at 10/3/2023 1443  Last data filed at 10/3/2023 1341  Gross per 24 hour   Intake --   Output 850 ml   Net -850 ml       Weight:   There were no vitals filed for this visit.    Most recent vitals:   Vitals:    10/03/23 1236 10/03/23 1300 10/03/23 1306 10/03/23 1406   BP: 109/97  143/71    Pulse: 91 90 88 82   Resp:       Temp:       SpO2: 91% 94%  93%       Active LDAs/IV Access:   Lines, Drains & Airways       Active LDAs       Name Placement date Placement time Site Days    Peripheral IV 10/03/23 1339 Left Antecubital 10/03/23  1339  Antecubital  less than 1    Urethral Catheter Coude 18 Fr. 10/03/23  1340  -- less than 1    External Urinary Catheter 06/05/23  2030  --  119                    Labs (abnormal labs have a star):   Labs Reviewed   COMPREHENSIVE METABOLIC PANEL - Abnormal; Notable for the following components:       Result Value    Glucose 263 (*)     Sodium 135 (*)     Chloride 92 (*)     Albumin 3.2 (*)     All other components within normal limits    Narrative:     GFR Normal >60  Chronic Kidney Disease <60  Kidney Failure <15    The GFR formula is only valid for adults with stable renal function between ages 18 and 70.   URINALYSIS W/  CULTURE IF INDICATED - Abnormal; Notable for the following components:    Glucose,  mg/dL (2+) (*)     Ketones, UA 40 mg/dL (2+) (*)     Protein, UA Trace (*)     All other components within normal limits    Narrative:     In absence of clinical symptoms, the presence of pyuria, bacteria, and/or nitrites on the urinalysis result does not correlate with infection.  Urine microscopic not indicated.   CBC WITH AUTO DIFFERENTIAL - Abnormal; Notable for the following components:    WBC 12.39 (*)     RDW 12.2 (*)     Neutrophil % 87.6 (*)     Lymphocyte % 6.0 (*)     Monocyte % 4.8 (*)     Immature Grans % 0.9 (*)     Neutrophils, Absolute 10.85 (*)     Immature Grans, Absolute 0.11 (*)     All other components within normal limits   CBC AND DIFFERENTIAL    Narrative:     The following orders were created for panel order CBC & Differential.  Procedure                               Abnormality         Status                     ---------                               -----------         ------                     CBC Auto Differential[[id number]]        Abnormal            Final result                 Please view results for these tests on the individual orders.       EKG:   No orders to display       Meds given in ED:   Medications   piperacillin-tazobactam (ZOSYN) 3.375 g in iso-osmotic dextrose 50 ml (premix) (has no administration in time range)       Imaging results:  No radiology results for the last day    Ambulatory status:   - Assist x2     Social issues:   Social History     Socioeconomic History    Marital status:    Tobacco Use    Smoking status: Former     Packs/day: 0.75     Years: 16.00     Pack years: 12.00     Types: Cigarettes     Quit date:      Years since quittin.7     Passive exposure: Past    Smokeless tobacco: Never    Tobacco comments:     Start age:39/Stopping age:47, 3/4 PPD   Vaping Use    Vaping Use: Never used   Substance and Sexual Activity    Alcohol use: Not Currently      Comment: alcohol abuse, none x 25 years / caffeine use - 1 cup daily    Drug use: No    Sexual activity: Defer       NIH Stroke Scale:       Mely Chery RN  10/03/23 14:43 EDT

## 2023-10-03 NOTE — PROGRESS NOTES
Clinical Pharmacy Services: Medication History    Dilip Verma is a 73 y.o. male presenting to [facility] for   Chief Complaint   Patient presents with    Abscess           Urinary Retention              He  has a past medical history of AMENA (acute kidney injury) (12/03/2020), CORI positive, Anemia, Anxiety, Ataxia, Atypical chest pain (04/19/2022), Balance disorder, Cataract, Cervical radiculopathy (11/11/2019), Cervical spinal cord compression, Chronic diastolic (congestive) heart failure, Chronic kidney disease, Chronic pancreatitis, Closed left subtrochanteric femur fracture (12/01/2020), Closed nondisplaced intertrochanteric fracture of left femur (12/01/2020), Colon polyps, Constipation, Contracture, right hand, Coronary artery disease, COVID-19 (07/2022), DDD (degenerative disc disease), cervical, Diabetes mellitus, type II, Diabetic retinopathy, Dysphagia, Elevated brain natriuretic peptide (BNP) level (08/2014), Elevated LFTs (03/2021), Erectile dysfunction, Foot drop, Fuchs' corneal dystrophy of right eye, Glaucoma, History of alcohol abuse, Hyperlipidemia, Hypersomnia, Hypertension, Hypertensive urgency (02/25/2020), Insomnia, Kidney stones, LV dysfunction (06/2016), Lyme disease, Lymphadenopathy syndrome (05/2021), Macular edema, Myocardial infarction (11/05/2019), Myocardial infarction (07/12/2019), Non-celiac gluten sensitivity, Orthostasis, Osteoporosis, Oxygen dependent, PAD (peripheral artery disease), PNA (pneumonia) (06/2016), Polyneuropathy, PTSD (post-traumatic stress disorder), Pulmonary hypertension, Pulmonary nodule, Senile ectropion of both lower eyelids (02/2022), Sepsis (12/16/2020), Sleep apnea, Spinal stenosis in cervical region, Stroke (2012), Syncope and collapse (07/06/2019), Urinary retention (04/05/2021), and Vitamin D deficiency.    Allergies as of 10/03/2023 - Reviewed 10/03/2023   Allergen Reaction Noted    Ace inhibitors Angioedema 10/27/2015    Losartan  Angioedema 03/01/2016    Seroquel [quetiapine] Hallucinations 06/16/2021    Xanax [alprazolam] Unknown - High Severity 01/09/2021    Amlodipine Swelling 03/01/2020    Ativan [lorazepam] Hallucinations 07/15/2019    Baclofen Hallucinations 06/05/2023    Minoxidil Confusion 08/13/2019    Tetracycline Rash 09/19/2014       Medication information was obtained from: Spouse  Pharmacy and Phone Number:     Prior to Admission Medications       Prescriptions Last Dose Informant Patient Reported? Taking?    aspirin 81 MG EC tablet  Spouse/Significant Other Yes Yes    Take 1 tablet by mouth Daily.    brimonidine (ALPHAGAN) 0.2 % ophthalmic solution  Spouse/Significant Other Yes Yes    Administer 1 drop to both eyes 2 (Two) Times a Day.    Cholecalciferol (Vitamin D-3) 125 MCG (5000 UT) tablet  Self Yes Yes    Take 1 tablet by mouth Daily.    Docusate Sodium 100 MG capsule   Yes Yes    Take 1 tablet by mouth Daily.    Insulin Glargine, 2 Unit Dial, (TOUJEO) 300 UNIT/ML solution pen-injector injection  Spouse/Significant Other Yes Yes    Inject 30 Units under the skin into the appropriate area as directed Daily.    insulin lispro (humaLOG) 100 UNIT/ML injection  Self No Yes    Inject 0-14 Units under the skin into the appropriate area as directed 3 (Three) Times a Day Before Meals.    Patient taking differently:  Inject 0-14 Units under the skin into the appropriate area as directed 3 (Three) Times a Day Before Meals. SLIDING SCALE    latanoprost (XALATAN) 0.005 % ophthalmic solution  Spouse/Significant Other Yes Yes    Administer 1 drop to both eyes every night at bedtime.    MAGNESIUM PO  Self Yes Yes    Take 1 tablet by mouth Daily.    sildenafil (REVATIO) 20 MG tablet  Self No Yes    TAKE 1-3 TABLETS 1 HR PRIOR TO SEXUAL ACTIVITY    Patient taking differently:  Take 1 tablet by mouth As Needed.    testosterone (ANDROGEL) 25 MG/2.5GM (1%) gel gel  Spouse/Significant Other Yes Yes    Place 25 mg on the skin as directed by  provider Daily.    traMADol (ULTRAM) 50 MG tablet  Self No Yes    Take 1 tablet by mouth Every 8 (Eight) Hours As Needed for Severe Pain.    Patient taking differently:  Take 1 tablet by mouth At Night As Needed for Severe Pain.    Unable to find  Spouse/Significant Other Yes Yes    Apply 1 each topically to the appropriate area as directed 1 (One) Time. Cbd pain stick    bumetanide (BUMEX) 1 MG tablet  Spouse/Significant Other Yes No    Take 0.5 tablets by mouth 2 (Two) Times a Day. Holding Since 9/30/23    mometasone (ELOCON) 0.1 % ointment   No No    Apply 1 application topically to the appropriate area as directed Daily. Do not exceed 1 week, needs to be off 1 week and can restart              Medication notes:     This medication list is complete to the best of my knowledge as of 10/3/2023    Please call if questions.    Rubio Webster  Medication History Technician  [phone]    10/3/2023 15:52 EDT

## 2023-10-03 NOTE — PROGRESS NOTES
[facility] Clinical Pharmacy Services: Piperacillin-Tazobactam Consult    Pt Name: Dilip Verma   : 1950    Indication: Skin and Soft Tissue    Relevant clinical data and objective history reviewed:    Past Medical History:   Diagnosis Date    AMENA (acute kidney injury) 2020    CORI positive     Anemia     Anxiety     Ataxia     Atypical chest pain 2022    SEEN AT [facility]    Balance disorder     Cataract     BILATERAL, S/P EXTRACTION    Cervical radiculopathy 2019    SEEN AT  [facility]    Cervical spinal cord compression     Chronic diastolic (congestive) heart failure     Chronic kidney disease     STAGE 3, FOLLOWED BY DR. VIVAR    Chronic pancreatitis     Closed left subtrochanteric femur fracture 2020    ADMITTED TO [facility]    Closed nondisplaced intertrochanteric fracture of left femur 2020    ADMITTED TO [facility]    Colon polyps     FOLLOWED BY DR. AVTAR JEONG    Constipation     Contracture, right hand     Coronary artery disease     CABG 2019    COVID-19 2022    DDD (degenerative disc disease), cervical     Diabetes mellitus, type II     IDDM    Diabetic retinopathy     Dysphagia     Elevated brain natriuretic peptide (BNP) level 2014    Elevated LFTs 2021    Erectile dysfunction     Foot drop     Fuchs' corneal dystrophy of right eye     Glaucoma     BILATERAL    History of alcohol abuse     Hyperlipidemia     Hypersomnia     Hypertension     Hypertensive urgency 2020    ADMITTED TO [facility]    Insomnia     Kidney stones     LV dysfunction 2016    Lyme disease     Lymphadenopathy syndrome 2021    Macular edema     BILATERAL    Myocardial infarction 2019    NSTEMI, ADMITTED TO [facility]    Myocardial infarction 2019    NSTEMI, ADMITTED TO [facility]    Non-celiac gluten sensitivity     Orthostasis     Osteoporosis     Oxygen dependent     PAD (peripheral artery disease)     PNA (pneumonia) 2016    LEFT LOBE    Polyneuropathy     PTSD (post-traumatic stress  "disorder)     Pulmonary hypertension     Pulmonary nodule     Senile ectropion of both lower eyelids 02/2022    Sepsis 12/16/2020    D/T UTI, ADMITTED TO [facility]    Sleep apnea     STATES DOESN'T USE BIPAP OR CPAP    Spinal stenosis in cervical region     SEVERE-LIMITED ROM    Stroke 2012    \"slight stroke\"    Syncope and collapse 07/06/2019    ADMITTED TO [place]    Urinary retention 04/05/2021    SEEN AT [facility]    Vitamin D deficiency      Creatinine   Date Value Ref Range Status   10/03/2023 0.98 0.76 - 1.27 mg/dL Final   06/08/2023 1.55 (H) 0.76 - 1.27 mg/dL Final   06/07/2023 1.34 (H) 0.76 - 1.27 mg/dL Final   07/09/2019 1.1 0.7 - 1.5 mg/dL Final   07/08/2019 1.0 0.7 - 1.5 mg/dL Final   07/07/2019 1.0 0.7 - 1.5 mg/dL Final     BUN   Date Value Ref Range Status   10/03/2023 12 8 - 23 mg/dL Final   07/09/2019 25 (H) 7 - 20 mg/dL Final     Estimated Creatinine Clearance: 75.7 mL/min (by C-G formula based on SCr of 0.98 mg/dL).    Lab Results   Component Value Date    WBC 12.39 (H) 10/03/2023     Temp Readings from Last 3 Encounters:   10/03/23 97.9 °F (36.6 °C) (Oral)   07/19/23 97.3 °F (36.3 °C)   06/08/23 98.5 °F (36.9 °C) (Oral)      Assessment/Plan  Estimated CrCl >20 mL/min at this time; BMI 26.33 kg/m2  Will start piperacillin-tazobactam 3.375 g IV every 8 hours for 7 days    Pharmacy will continue to follow daily while on piperacillin-tazobactam and adjust as needed. Thank you for this consult.    Joelle Rg, [facility]  Clinical Pharmacist    "

## 2023-10-04 ENCOUNTER — APPOINTMENT (OUTPATIENT)
Dept: GENERAL RADIOLOGY | Facility: HOSPITAL | Age: 73
End: 2023-10-04
Payer: MEDICARE

## 2023-10-04 ENCOUNTER — ANESTHESIA (OUTPATIENT)
Dept: PERIOP | Facility: HOSPITAL | Age: 73
End: 2023-10-04
Payer: MEDICARE

## 2023-10-04 ENCOUNTER — ANESTHESIA EVENT (OUTPATIENT)
Dept: PERIOP | Facility: HOSPITAL | Age: 73
End: 2023-10-04
Payer: MEDICARE

## 2023-10-04 LAB
ALBUMIN SERPL-MCNC: 3 G/DL (ref 3.5–5.2)
ALBUMIN/GLOB SERPL: 0.8 G/DL
ALP SERPL-CCNC: 69 U/L (ref 39–117)
ALT SERPL W P-5'-P-CCNC: 20 U/L (ref 1–41)
ANION GAP SERPL CALCULATED.3IONS-SCNC: 10.7 MMOL/L (ref 5–15)
AST SERPL-CCNC: 15 U/L (ref 1–40)
BASOPHILS # BLD AUTO: 0.03 10*3/MM3 (ref 0–0.2)
BASOPHILS NFR BLD AUTO: 0.2 % (ref 0–1.5)
BILIRUB SERPL-MCNC: 0.5 MG/DL (ref 0–1.2)
BUN SERPL-MCNC: 14 MG/DL (ref 8–23)
BUN/CREAT SERPL: 15.2 (ref 7–25)
CALCIUM SPEC-SCNC: 9 MG/DL (ref 8.6–10.5)
CHLORIDE SERPL-SCNC: 98 MMOL/L (ref 98–107)
CO2 SERPL-SCNC: 30.3 MMOL/L (ref 22–29)
CREAT SERPL-MCNC: 0.92 MG/DL (ref 0.76–1.27)
DEPRECATED RDW RBC AUTO: 40.6 FL (ref 37–54)
EGFRCR SERPLBLD CKD-EPI 2021: 87.3 ML/MIN/1.73
EOSINOPHIL # BLD AUTO: 0.03 10*3/MM3 (ref 0–0.4)
EOSINOPHIL NFR BLD AUTO: 0.2 % (ref 0.3–6.2)
ERYTHROCYTE [DISTWIDTH] IN BLOOD BY AUTOMATED COUNT: 12.1 % (ref 12.3–15.4)
GLOBULIN UR ELPH-MCNC: 3.6 GM/DL
GLUCOSE BLDC GLUCOMTR-MCNC: 194 MG/DL (ref 70–130)
GLUCOSE BLDC GLUCOMTR-MCNC: 198 MG/DL (ref 70–130)
GLUCOSE BLDC GLUCOMTR-MCNC: 200 MG/DL (ref 70–130)
GLUCOSE BLDC GLUCOMTR-MCNC: 209 MG/DL (ref 70–130)
GLUCOSE BLDC GLUCOMTR-MCNC: 232 MG/DL (ref 70–130)
GLUCOSE BLDC GLUCOMTR-MCNC: 256 MG/DL (ref 70–130)
GLUCOSE SERPL-MCNC: 233 MG/DL (ref 65–99)
HCT VFR BLD AUTO: 45.6 % (ref 37.5–51)
HGB BLD-MCNC: 15.3 G/DL (ref 13–17.7)
IMM GRANULOCYTES # BLD AUTO: 0.1 10*3/MM3 (ref 0–0.05)
IMM GRANULOCYTES NFR BLD AUTO: 0.7 % (ref 0–0.5)
LYMPHOCYTES # BLD AUTO: 1.04 10*3/MM3 (ref 0.7–3.1)
LYMPHOCYTES NFR BLD AUTO: 7.4 % (ref 19.6–45.3)
MCH RBC QN AUTO: 30.8 PG (ref 26.6–33)
MCHC RBC AUTO-ENTMCNC: 33.6 G/DL (ref 31.5–35.7)
MCV RBC AUTO: 91.9 FL (ref 79–97)
MONOCYTES # BLD AUTO: 0.93 10*3/MM3 (ref 0.1–0.9)
MONOCYTES NFR BLD AUTO: 6.6 % (ref 5–12)
NEUTROPHILS NFR BLD AUTO: 11.94 10*3/MM3 (ref 1.7–7)
NEUTROPHILS NFR BLD AUTO: 84.9 % (ref 42.7–76)
NRBC BLD AUTO-RTO: 0 /100 WBC (ref 0–0.2)
PLATELET # BLD AUTO: 300 10*3/MM3 (ref 140–450)
PMV BLD AUTO: 10.4 FL (ref 6–12)
POTASSIUM SERPL-SCNC: 3.9 MMOL/L (ref 3.5–5.2)
PROT SERPL-MCNC: 6.6 G/DL (ref 6–8.5)
RBC # BLD AUTO: 4.96 10*6/MM3 (ref 4.14–5.8)
SODIUM SERPL-SCNC: 139 MMOL/L (ref 136–145)
WBC NRBC COR # BLD: 14.07 10*3/MM3 (ref 3.4–10.8)

## 2023-10-04 PROCEDURE — 25010000002 PROPOFOL 10 MG/ML EMULSION: Performed by: REGISTERED NURSE

## 2023-10-04 PROCEDURE — G0378 HOSPITAL OBSERVATION PER HR: HCPCS

## 2023-10-04 PROCEDURE — 10061 I&D ABSCESS COMP/MULTIPLE: CPT | Performed by: SURGERY

## 2023-10-04 PROCEDURE — 25010000002 PIPERACILLIN SOD-TAZOBACTAM PER 1 G: Performed by: INTERNAL MEDICINE

## 2023-10-04 PROCEDURE — 87015 SPECIMEN INFECT AGNT CONCNTJ: CPT | Performed by: SURGERY

## 2023-10-04 PROCEDURE — 25010000002 PIPERACILLIN SOD-TAZOBACTAM PER 1 G: Performed by: SURGERY

## 2023-10-04 PROCEDURE — 25010000002 FENTANYL CITRATE (PF) 50 MCG/ML SOLUTION: Performed by: REGISTERED NURSE

## 2023-10-04 PROCEDURE — 87076 CULTURE ANAEROBE IDENT EACH: CPT | Performed by: SURGERY

## 2023-10-04 PROCEDURE — 87070 CULTURE OTHR SPECIMN AEROBIC: CPT | Performed by: SURGERY

## 2023-10-04 PROCEDURE — 25010000002 SUGAMMADEX 200 MG/2ML SOLUTION: Performed by: REGISTERED NURSE

## 2023-10-04 PROCEDURE — 25010000002 PHENYLEPHRINE 10 MG/ML SOLUTION: Performed by: REGISTERED NURSE

## 2023-10-04 PROCEDURE — 25810000003 LACTATED RINGERS PER 1000 ML: Performed by: ANESTHESIOLOGY

## 2023-10-04 PROCEDURE — 63710000001 INSULIN LISPRO (HUMAN) PER 5 UNITS: Performed by: SURGERY

## 2023-10-04 PROCEDURE — 87075 CULTR BACTERIA EXCEPT BLOOD: CPT | Performed by: SURGERY

## 2023-10-04 PROCEDURE — 80053 COMPREHEN METABOLIC PANEL: CPT | Performed by: INTERNAL MEDICINE

## 2023-10-04 PROCEDURE — C9290 INJ, BUPIVACAINE LIPOSOME: HCPCS | Performed by: SURGERY

## 2023-10-04 PROCEDURE — 93005 ELECTROCARDIOGRAM TRACING: CPT | Performed by: INTERNAL MEDICINE

## 2023-10-04 PROCEDURE — 63710000001 INSULIN LISPRO (HUMAN) PER 5 UNITS: Performed by: INTERNAL MEDICINE

## 2023-10-04 PROCEDURE — 82948 REAGENT STRIP/BLOOD GLUCOSE: CPT

## 2023-10-04 PROCEDURE — 93010 ELECTROCARDIOGRAM REPORT: CPT | Performed by: INTERNAL MEDICINE

## 2023-10-04 PROCEDURE — 87205 SMEAR GRAM STAIN: CPT | Performed by: SURGERY

## 2023-10-04 PROCEDURE — 25010000002 LABETALOL 5 MG/ML SOLUTION: Performed by: REGISTERED NURSE

## 2023-10-04 PROCEDURE — 0 BUPIVACAINE LIPOSOME 1.3 % SUSPENSION 20 ML VIAL: Performed by: SURGERY

## 2023-10-04 PROCEDURE — 25010000002 FENTANYL CITRATE (PF) 50 MCG/ML SOLUTION: Performed by: ANESTHESIOLOGY

## 2023-10-04 PROCEDURE — 63710000001 INSULIN GLARGINE PER 5 UNITS: Performed by: SURGERY

## 2023-10-04 PROCEDURE — 25010000002 ONDANSETRON PER 1 MG: Performed by: REGISTERED NURSE

## 2023-10-04 PROCEDURE — 71045 X-RAY EXAM CHEST 1 VIEW: CPT

## 2023-10-04 PROCEDURE — 85025 COMPLETE CBC W/AUTO DIFF WBC: CPT | Performed by: INTERNAL MEDICINE

## 2023-10-04 PROCEDURE — 11042 DBRDMT SUBQ TIS 1ST 20SQCM/<: CPT | Performed by: SURGERY

## 2023-10-04 PROCEDURE — 99213 OFFICE O/P EST LOW 20 MIN: CPT | Performed by: SURGERY

## 2023-10-04 RX ORDER — DROPERIDOL 2.5 MG/ML
0.62 INJECTION, SOLUTION INTRAMUSCULAR; INTRAVENOUS
Status: DISCONTINUED | OUTPATIENT
Start: 2023-10-04 | End: 2023-10-04 | Stop reason: HOSPADM

## 2023-10-04 RX ORDER — TAMSULOSIN HYDROCHLORIDE 0.4 MG/1
0.4 CAPSULE ORAL DAILY
Status: DISCONTINUED | OUTPATIENT
Start: 2023-10-04 | End: 2023-10-07 | Stop reason: HOSPADM

## 2023-10-04 RX ORDER — MAGNESIUM HYDROXIDE 1200 MG/15ML
LIQUID ORAL AS NEEDED
Status: DISCONTINUED | OUTPATIENT
Start: 2023-10-04 | End: 2023-10-04 | Stop reason: HOSPADM

## 2023-10-04 RX ORDER — FENTANYL CITRATE 50 UG/ML
50 INJECTION, SOLUTION INTRAMUSCULAR; INTRAVENOUS ONCE AS NEEDED
Status: COMPLETED | OUTPATIENT
Start: 2023-10-04 | End: 2023-10-04

## 2023-10-04 RX ORDER — HYDROCODONE BITARTRATE AND ACETAMINOPHEN 5; 325 MG/1; MG/1
1 TABLET ORAL ONCE AS NEEDED
Status: DISCONTINUED | OUTPATIENT
Start: 2023-10-04 | End: 2023-10-04 | Stop reason: HOSPADM

## 2023-10-04 RX ORDER — LIDOCAINE HYDROCHLORIDE 10 MG/ML
0.5 INJECTION, SOLUTION INFILTRATION; PERINEURAL ONCE AS NEEDED
Status: DISCONTINUED | OUTPATIENT
Start: 2023-10-04 | End: 2023-10-04 | Stop reason: HOSPADM

## 2023-10-04 RX ORDER — HYDROMORPHONE HYDROCHLORIDE 1 MG/ML
0.5 INJECTION, SOLUTION INTRAMUSCULAR; INTRAVENOUS; SUBCUTANEOUS
Status: DISCONTINUED | OUTPATIENT
Start: 2023-10-04 | End: 2023-10-04 | Stop reason: HOSPADM

## 2023-10-04 RX ORDER — PROMETHAZINE HYDROCHLORIDE 25 MG/1
25 SUPPOSITORY RECTAL ONCE AS NEEDED
Status: DISCONTINUED | OUTPATIENT
Start: 2023-10-04 | End: 2023-10-04 | Stop reason: HOSPADM

## 2023-10-04 RX ORDER — FENTANYL CITRATE 50 UG/ML
INJECTION, SOLUTION INTRAMUSCULAR; INTRAVENOUS AS NEEDED
Status: DISCONTINUED | OUTPATIENT
Start: 2023-10-04 | End: 2023-10-04 | Stop reason: SURG

## 2023-10-04 RX ORDER — IPRATROPIUM BROMIDE AND ALBUTEROL SULFATE 2.5; .5 MG/3ML; MG/3ML
3 SOLUTION RESPIRATORY (INHALATION) ONCE AS NEEDED
Status: DISCONTINUED | OUTPATIENT
Start: 2023-10-04 | End: 2023-10-04 | Stop reason: HOSPADM

## 2023-10-04 RX ORDER — OXYCODONE AND ACETAMINOPHEN 7.5; 325 MG/1; MG/1
1 TABLET ORAL EVERY 4 HOURS PRN
Status: DISCONTINUED | OUTPATIENT
Start: 2023-10-04 | End: 2023-10-04 | Stop reason: HOSPADM

## 2023-10-04 RX ORDER — MORPHINE SULFATE 2 MG/ML
1 INJECTION, SOLUTION INTRAMUSCULAR; INTRAVENOUS EVERY 4 HOURS PRN
Status: DISCONTINUED | OUTPATIENT
Start: 2023-10-04 | End: 2023-10-07 | Stop reason: HOSPADM

## 2023-10-04 RX ORDER — SODIUM CHLORIDE 0.9 % (FLUSH) 0.9 %
3-10 SYRINGE (ML) INJECTION AS NEEDED
Status: DISCONTINUED | OUTPATIENT
Start: 2023-10-04 | End: 2023-10-04 | Stop reason: HOSPADM

## 2023-10-04 RX ORDER — DIPHENHYDRAMINE HYDROCHLORIDE 50 MG/ML
12.5 INJECTION INTRAMUSCULAR; INTRAVENOUS
Status: DISCONTINUED | OUTPATIENT
Start: 2023-10-04 | End: 2023-10-04 | Stop reason: HOSPADM

## 2023-10-04 RX ORDER — PHENYLEPHRINE HYDROCHLORIDE 10 MG/ML
INJECTION INTRAVENOUS AS NEEDED
Status: DISCONTINUED | OUTPATIENT
Start: 2023-10-04 | End: 2023-10-04 | Stop reason: SURG

## 2023-10-04 RX ORDER — HYDRALAZINE HYDROCHLORIDE 20 MG/ML
5 INJECTION INTRAMUSCULAR; INTRAVENOUS
Status: DISCONTINUED | OUTPATIENT
Start: 2023-10-04 | End: 2023-10-04 | Stop reason: HOSPADM

## 2023-10-04 RX ORDER — PROPOFOL 10 MG/ML
VIAL (ML) INTRAVENOUS AS NEEDED
Status: DISCONTINUED | OUTPATIENT
Start: 2023-10-04 | End: 2023-10-04 | Stop reason: SURG

## 2023-10-04 RX ORDER — HYDROCODONE BITARTRATE AND ACETAMINOPHEN 7.5; 325 MG/1; MG/1
1 TABLET ORAL EVERY 6 HOURS PRN
Status: DISCONTINUED | OUTPATIENT
Start: 2023-10-04 | End: 2023-10-07 | Stop reason: HOSPADM

## 2023-10-04 RX ORDER — LABETALOL HYDROCHLORIDE 5 MG/ML
5 INJECTION, SOLUTION INTRAVENOUS
Status: DISCONTINUED | OUTPATIENT
Start: 2023-10-04 | End: 2023-10-04 | Stop reason: HOSPADM

## 2023-10-04 RX ORDER — FAMOTIDINE 10 MG/ML
20 INJECTION, SOLUTION INTRAVENOUS ONCE
Status: DISCONTINUED | OUTPATIENT
Start: 2023-10-04 | End: 2023-10-04 | Stop reason: HOSPADM

## 2023-10-04 RX ORDER — SODIUM CHLORIDE, SODIUM LACTATE, POTASSIUM CHLORIDE, CALCIUM CHLORIDE 600; 310; 30; 20 MG/100ML; MG/100ML; MG/100ML; MG/100ML
9 INJECTION, SOLUTION INTRAVENOUS CONTINUOUS
Status: DISCONTINUED | OUTPATIENT
Start: 2023-10-04 | End: 2023-10-06

## 2023-10-04 RX ORDER — SODIUM CHLORIDE 0.9 % (FLUSH) 0.9 %
3 SYRINGE (ML) INJECTION EVERY 12 HOURS SCHEDULED
Status: DISCONTINUED | OUTPATIENT
Start: 2023-10-04 | End: 2023-10-04 | Stop reason: HOSPADM

## 2023-10-04 RX ORDER — ONDANSETRON 2 MG/ML
INJECTION INTRAMUSCULAR; INTRAVENOUS AS NEEDED
Status: DISCONTINUED | OUTPATIENT
Start: 2023-10-04 | End: 2023-10-04 | Stop reason: SURG

## 2023-10-04 RX ORDER — FENTANYL CITRATE 50 UG/ML
50 INJECTION, SOLUTION INTRAMUSCULAR; INTRAVENOUS
Status: DISCONTINUED | OUTPATIENT
Start: 2023-10-04 | End: 2023-10-04 | Stop reason: HOSPADM

## 2023-10-04 RX ORDER — ONDANSETRON 2 MG/ML
4 INJECTION INTRAMUSCULAR; INTRAVENOUS ONCE AS NEEDED
Status: DISCONTINUED | OUTPATIENT
Start: 2023-10-04 | End: 2023-10-04 | Stop reason: HOSPADM

## 2023-10-04 RX ORDER — PROMETHAZINE HYDROCHLORIDE 25 MG/1
25 TABLET ORAL ONCE AS NEEDED
Status: DISCONTINUED | OUTPATIENT
Start: 2023-10-04 | End: 2023-10-04 | Stop reason: HOSPADM

## 2023-10-04 RX ORDER — EPHEDRINE SULFATE 50 MG/ML
5 INJECTION, SOLUTION INTRAVENOUS ONCE AS NEEDED
Status: DISCONTINUED | OUTPATIENT
Start: 2023-10-04 | End: 2023-10-04 | Stop reason: HOSPADM

## 2023-10-04 RX ORDER — NALOXONE HCL 0.4 MG/ML
0.2 VIAL (ML) INJECTION AS NEEDED
Status: DISCONTINUED | OUTPATIENT
Start: 2023-10-04 | End: 2023-10-04 | Stop reason: HOSPADM

## 2023-10-04 RX ORDER — LIDOCAINE HYDROCHLORIDE 20 MG/ML
INJECTION, SOLUTION INFILTRATION; PERINEURAL AS NEEDED
Status: DISCONTINUED | OUTPATIENT
Start: 2023-10-04 | End: 2023-10-04 | Stop reason: SURG

## 2023-10-04 RX ADMIN — PIPERACILLIN SODIUM AND TAZOBACTAM SODIUM 3.38 G: 3; .375 INJECTION, SOLUTION INTRAVENOUS at 20:13

## 2023-10-04 RX ADMIN — INSULIN LISPRO 3 UNITS: 100 INJECTION, SOLUTION INTRAVENOUS; SUBCUTANEOUS at 18:21

## 2023-10-04 RX ADMIN — HYDROCODONE BITARTRATE AND ACETAMINOPHEN 1 TABLET: 7.5; 325 TABLET ORAL at 16:41

## 2023-10-04 RX ADMIN — PHENYLEPHRINE HYDROCHLORIDE 100 MCG: 10 INJECTION INTRAVENOUS at 12:20

## 2023-10-04 RX ADMIN — SODIUM CHLORIDE, POTASSIUM CHLORIDE, SODIUM LACTATE AND CALCIUM CHLORIDE: 600; 310; 30; 20 INJECTION, SOLUTION INTRAVENOUS at 12:01

## 2023-10-04 RX ADMIN — FENTANYL CITRATE 50 MCG: 50 INJECTION, SOLUTION INTRAMUSCULAR; INTRAVENOUS at 11:24

## 2023-10-04 RX ADMIN — SUGAMMADEX 400 MG: 100 INJECTION, SOLUTION INTRAVENOUS at 12:41

## 2023-10-04 RX ADMIN — LATANOPROST 1 DROP: 50 SOLUTION OPHTHALMIC at 22:56

## 2023-10-04 RX ADMIN — PIPERACILLIN SODIUM AND TAZOBACTAM SODIUM 3.38 G: 3; .375 INJECTION, SOLUTION INTRAVENOUS at 13:26

## 2023-10-04 RX ADMIN — INSULIN LISPRO 2 UNITS: 100 INJECTION, SOLUTION INTRAVENOUS; SUBCUTANEOUS at 08:20

## 2023-10-04 RX ADMIN — INSULIN LISPRO 1 UNITS: 100 INJECTION, SOLUTION INTRAVENOUS; SUBCUTANEOUS at 22:53

## 2023-10-04 RX ADMIN — INSULIN GLARGINE 24 UNITS: 100 INJECTION, SOLUTION SUBCUTANEOUS at 22:54

## 2023-10-04 RX ADMIN — LABETALOL HYDROCHLORIDE 5 MG: 5 INJECTION, SOLUTION INTRAVENOUS at 13:52

## 2023-10-04 RX ADMIN — BUMETANIDE 0.5 MG: 0.5 TABLET ORAL at 20:14

## 2023-10-04 RX ADMIN — Medication 10 ML: at 20:15

## 2023-10-04 RX ADMIN — TAMSULOSIN HYDROCHLORIDE 0.4 MG: 0.4 CAPSULE ORAL at 18:21

## 2023-10-04 RX ADMIN — BUMETANIDE 0.5 MG: 0.5 TABLET ORAL at 08:15

## 2023-10-04 RX ADMIN — HYDROCODONE BITARTRATE AND ACETAMINOPHEN 1 TABLET: 7.5; 325 TABLET ORAL at 23:32

## 2023-10-04 RX ADMIN — PROPOFOL 200 MG: 10 INJECTION, EMULSION INTRAVENOUS at 12:06

## 2023-10-04 RX ADMIN — Medication 10 ML: at 08:16

## 2023-10-04 RX ADMIN — FENTANYL CITRATE 50 MCG: 50 INJECTION, SOLUTION INTRAMUSCULAR; INTRAVENOUS at 12:06

## 2023-10-04 RX ADMIN — ONDANSETRON 4 MG: 2 INJECTION INTRAMUSCULAR; INTRAVENOUS at 12:16

## 2023-10-04 RX ADMIN — SENNOSIDES AND DOCUSATE SODIUM 2 TABLET: 50; 8.6 TABLET ORAL at 20:15

## 2023-10-04 RX ADMIN — BRIMONIDINE TARTRATE 1 DROP: 2 SOLUTION OPHTHALMIC at 20:16

## 2023-10-04 RX ADMIN — Medication 5000 UNITS: at 18:21

## 2023-10-04 RX ADMIN — LIDOCAINE HYDROCHLORIDE 100 MG: 20 INJECTION, SOLUTION INFILTRATION; PERINEURAL at 12:06

## 2023-10-04 RX ADMIN — Medication 250 MG: at 20:15

## 2023-10-04 RX ADMIN — PIPERACILLIN SODIUM AND TAZOBACTAM SODIUM 3.38 G: 3; .375 INJECTION, SOLUTION INTRAVENOUS at 06:00

## 2023-10-04 RX ADMIN — BRIMONIDINE TARTRATE 1 DROP: 2 SOLUTION OPHTHALMIC at 08:16

## 2023-10-04 NOTE — PROGRESS NOTES
Neurosurgery checked on patient around 9:30 AM.  Significant other was in the room but the patient had gone down for surgery for incision and drainage of abscess.  Neurosurgery is awaiting urology consult to rule out any urologic causes for urinary retention.  Neurosurgery will check back once patient is back from surgery or once urology consult has been completed.

## 2023-10-04 NOTE — ANESTHESIA POSTPROCEDURE EVALUATION
"Patient: Dilip Verma    Procedure Summary       Date: 10/04/23 Room / Location: [facility] OR  / [facility] MA[facility] OR    Anesthesia Start: 1200 Anesthesia Stop: 1250    Procedure: Incision and drainage of perianal and buttock abscess (Perirectal) Diagnosis:       Gluteal abscess      (Gluteal abscess [L02.31])    Surgeons: Herbie Villatoro MD Provider: Renae Newby MD    Anesthesia Type: general ASA Status: 3            Anesthesia Type: general    Vitals  Vitals Value Taken Time   /85 10/04/23 1406   Temp 36.9 °C (98.5 °F) 10/04/23 1255   Pulse 87 10/04/23 1407   Resp 16 10/04/23 1400   SpO2 96 % 10/04/23 1407   Vitals shown include unvalidated device data.        Post Anesthesia Care and Evaluation    Patient location during evaluation: bedside  Patient participation: complete - patient participated  Level of consciousness: awake and alert  Pain management: adequate    Airway patency: patent  Anesthetic complications: No anesthetic complications    Cardiovascular status: acceptable  Respiratory status: acceptable  Hydration status: acceptable    Comments: /85 (BP Location: Right arm, Patient Position: Lying)   Pulse 84   Temp 36.7 °C (98 °F) (Oral)   Resp 16   Ht 175.3 cm (69.02\")   Wt 84.2 kg (185 lb 10 oz)   SpO2 94%   BMI 27.40 kg/m²     "

## 2023-10-04 NOTE — PROGRESS NOTES
Name: Dilip Verma ADMIT: 10/3/2023   : 1950  PCP: Tena Taylor MD    MRN: [id number] LOS: 0 days   AGE/SEX: 73 y.o. male  ROOM: [facility]     Subjective   Subjective   Feeling better this afternoon since coming back from OR  No N/V/D/F/C/NS/SOA/CP/palp/HA/vis changes/abd pain/back pain.  Tolerating po. Making urine.       Objective   Objective   Vital Signs  Temp:  [98 °F (36.7 °C)-99.7 °F (37.6 °C)] 98.6 °F (37 °C)  Heart Rate:  [72-98] 72  Resp:  [16-20] 16  BP: (131-175)/() 131/62  SpO2:  [90 %-98 %] 93 %  on  Flow (L/min):  [2-3] 2;   Device (Oxygen Therapy): nasal cannula  Body mass index is 27.4 kg/m².  Physical Exam  Vitals and nursing note reviewed. Exam conducted with a chaperone present (Wife and RN).   Constitutional:       General: He is not in acute distress.     Appearance: He is ill-appearing (chronically). He is not toxic-appearing or diaphoretic.   HENT:      Head: Normocephalic.      Nose: Nose normal.      Mouth/Throat:      Mouth: Mucous membranes are moist.      Pharynx: Oropharynx is clear.   Eyes:      General: No scleral icterus.        Right eye: No discharge.         Left eye: No discharge.      Extraocular Movements: Extraocular movements intact.      Conjunctiva/sclera: Conjunctivae normal.   Cardiovascular:      Rate and Rhythm: Normal rate and regular rhythm.      Pulses: Normal pulses.   Pulmonary:      Effort: Pulmonary effort is normal. No respiratory distress.      Breath sounds: Normal breath sounds. No wheezing or rales.   Abdominal:      General: Bowel sounds are normal. There is no distension.      Palpations: Abdomen is soft.      Tenderness: There is no abdominal tenderness.   Genitourinary:     Comments: Clear yellow urine in light bag  Musculoskeletal:         General: Deformity (contractures of BLEs and RUE) present. No swelling.      Cervical back: Neck supple. No rigidity.   Skin:     General: Skin is warm and dry.      Capillary Refill: Capillary  refill takes less than 2 seconds.      Coloration: Skin is pale. Skin is not jaundiced.   Neurological:      Mental Status: He is alert and oriented to person, place, and time. Mental status is at baseline.      Cranial Nerves: No cranial nerve deficit.      Comments: Bilateral foot drop   Psychiatric:         Mood and Affect: Mood normal.     Results Review     I reviewed the patient's new clinical results.  Results from last 7 days   Lab Units 10/04/23  0641 10/03/23  1245   WBC 10*3/mm3 14.07* 12.39*   HEMOGLOBIN g/dL 15.3 16.2   PLATELETS 10*3/mm3 300 271     Results from last 7 days   Lab Units 10/04/23  0641 10/03/23  1245   SODIUM mmol/L 139 135*   POTASSIUM mmol/L 3.9 4.3   CHLORIDE mmol/L 98 92*   CO2 mmol/L 30.3* 28.5   BUN mg/dL 14 12   CREATININE mg/dL 0.92 0.98   GLUCOSE mg/dL 233* 263*   EGFR mL/min/1.73 87.3 80.9     Results from last 7 days   Lab Units 10/04/23  0641 10/03/23  1245   ALBUMIN g/dL 3.0* 3.2*   BILIRUBIN mg/dL 0.5 0.6   ALK PHOS U/L 69 73   AST (SGOT) U/L 15 11   ALT (SGPT) U/L 20 23     Results from last 7 days   Lab Units 10/04/23  0641 10/03/23  1814 10/03/23  1245   CALCIUM mg/dL 9.0  --  9.3   ALBUMIN g/dL 3.0*  --  3.2*   MAGNESIUM mg/dL  --  1.9  --      Results from last 7 days   Lab Units 10/03/23  1814   PROCALCITONIN ng/mL 0.09   LACTATE mmol/L 1.6     Hemoglobin A1C   Date/Time Value Ref Range Status   10/03/2023 1245 8.90 (H) 4.80 - 5.60 % Final     Glucose   Date/Time Value Ref Range Status   10/04/2023 1640 200 (H) 70 - 130 mg/dL Final   10/04/2023 1308 232 (H) 70 - 130 mg/dL Final   10/04/2023 0941 256 (H) 70 - 130 mg/dL Final   10/04/2023 0819 209 (H) 70 - 130 mg/dL Final   10/03/2023 2206 267 (H) 70 - 130 mg/dL Final   10/03/2023 1844 266 (H) 70 - 130 mg/dL Final   10/03/2023 1650 270 (H) 70 - 130 mg/dL Final       No radiology results for the last day    I have personally reviewed all medications:  Scheduled Medications  aspirin, 81 mg, Oral, Daily  brimonidine, 1  drop, Both Eyes, BID  bumetanide, 1 mg, Intravenous, Once  bumetanide, 0.5 mg, Oral, BID  cholecalciferol, 5,000 Units, Oral, Q24H  docusate sodium, 100 mg, Oral, Daily  insulin glargine, 1-200 Units, Subcutaneous, Nightly - Glucommander  insulin lispro, 1-200 Units, Subcutaneous, 4x Daily With Meals & Nightly  latanoprost, 1 drop, Both Eyes, Nightly  magnesium (as) gluconate, 27 mg, Oral, Daily  piperacillin-tazobactam, 3.375 g, Intravenous, Q8H  saccharomyces boulardii, 250 mg, Oral, BID  senna-docusate sodium, 2 tablet, Oral, BID  sodium chloride, 10 mL, Intravenous, Q12H  tamsulosin, 0.4 mg, Oral, Daily  testosterone, 25 mg, Transdermal, Daily    Infusions  lactated ringers, 9 mL/hr, Last Rate: Stopped (10/04/23 1248)    Diet  Diet: Renal Diets; Low Potassium, Low Phosphorus; Texture: Regular Texture (IDDSI 7); Fluid Consistency: Thin (IDDSI 0)    I have personally reviewed:  [x]  Laboratory   [x]  Microbiology   [x]  Radiology   [x]  EKG/Telemetry  []  Cardiology/Vascular   []  Pathology    [x]  Records       Assessment/Plan     Active Hospital Problems    Diagnosis  POA    **Gluteal abscess [L02.31]  Yes    Cervical stenosis of spine [M48.02]  Yes    Spinal stenosis, lumbar region, without neurogenic claudication [M48.061]  Yes    Cellulitis [L03.90]  Yes    Medically noncompliant [Z91.199]  Not Applicable    Type 2 diabetes mellitus with diabetic neuropathy, with long-term current use of insulin [E11.40, Z79.4]  Not Applicable    Urinary retention [R33.9]  Yes    CKD (chronic kidney disease) stage 3, GFR 30-59 ml/min [N18.30]  Yes    History of stroke [Z86.73]  Not Applicable    HTN (hypertension) [I10]  Yes    CSA (central sleep apnea) [G47.31]  Yes    Chronic diastolic heart failure [I50.32]  Yes    Hx of CABG [Z95.1]  Not Applicable    CAD (coronary artery disease) [I25.10]  Yes      Resolved Hospital Problems   No resolved problems to display.       74yo gentleman with chronic cervical and lumbar spinal  stenosis, bilateral foot drop, right hand contracture, peripheral neuropathy, as well as diastolic CHF, CAD/CABG, sleep apnea, HTN, CKD3, HTN, prior CVA, DM2, and HLD, who presented to ER with a couple days of urinary retention and 2 weeks of a gluteal cleft abscess that failed outpt oral abx therapy.     Complex right gluteal abscess: s/p I&D today by Dr. Villatoro, continue IV Zosyn for now, operative cultures sent, PCT and LA wnl, afebrile, trend WBC    Urinary retention: has h/o urinary retention in past, followed by Dr. Velasco but lost to f/u for past 18mo  ?Due to BPH vs Neurogenic bladder  Appreciate BRISSA and  attention to pt  Will need outpt urodynamics  ?MRI spine  For now continue light    Severe, chronic lumbar and cervical spinal stenosis  Bilateral foot drop  Right hand contracture  Peripheral neuropathy: stable at baseline, tramadol continued, requiring some additional analgesia for postop pain    DM2: A1c 8.9, continue basal/bolus insulin per Glucommander, sugars improving    Chronic diastolic CHF: BNP quite high on admission, s/p dose of IV Bumex, appears euvolemic at present, no pulm edema on CXR, continue oral Bumex    CKD3a: Cr wnl here, monitor    HTN: BPs a bit robust today, not on meds PTA, monitor    Prior CVA: continue ASA, not on statin for some reason    Central sleep apnea: continue supplemental O2 when sleeping, pt has not seen his sleep MD in 3 years, did not tolerate BiPAP, uses nasal cannula O2 at home whenever he sleeps    CAD/CABG: no s/sx of ACS, continue ASA, not on statin, JOHN med, or BB    Prior Alcohol Abuse: not currently drinking alcohol      SCDs for DVT prophylaxis.  Full code.  Discussed with patient and wife. D/w RN. D/w Dr. Villatoro.  Anticipate discharge  TBD , PT eval pending.      Amador Valverde MD  [facility]  10/04/23  18:04 EDT

## 2023-10-04 NOTE — ANESTHESIA PREPROCEDURE EVALUATION
Anesthesia Evaluation     Patient summary reviewed and Nursing notes reviewed   NPO Solid Status: > 8 hours  NPO Liquid Status: > 4 hours           Airway   Mallampati: II  Neck ROM: limited  No difficulty expected  Dental      Comment: Multiple caps and implants    Pulmonary     breath sounds clear to auscultation  (+) pneumonia ,sleep apnea  Cardiovascular     Rhythm: regular    (+) hypertension, past MI , CAD, CABG, CHF , PVD, hyperlipidemia    ROS comment: RBBB    Neuro/Psych  (+) CVA residual symptoms, syncope, numbness, psychiatric history Anxiety and PTSD  GI/Hepatic/Renal/Endo    (+) renal disease, diabetes mellitus    Musculoskeletal     Abdominal   (+) obese   Substance History      OB/GYN          Other   arthritis,                     Anesthesia Plan    ASA 3     general     (Limited neck motion, CMAC)  intravenous induction     Anesthetic plan, risks, benefits, and alternatives have been provided, discussed and informed consent has been obtained with: patient.    CODE STATUS:    Code Status (Patient has no pulse and is not breathing): CPR (Attempt to Resuscitate)  Medical Interventions (Patient has pulse or is breathing): Full

## 2023-10-04 NOTE — PLAN OF CARE
Problem: Adult Inpatient Plan of Care  Goal: Plan of Care Review  Outcome: Ongoing, Progressing  Goal: Patient-Specific Goal (Individualized)  Outcome: Ongoing, Progressing  Goal: Absence of Hospital-Acquired Illness or Injury  Outcome: Ongoing, Progressing  Intervention: Identify and Manage Fall Risk  Recent Flowsheet Documentation  Taken 10/4/2023 0600 by Claudia Busby RN  Safety Promotion/Fall Prevention:   safety round/check completed   lighting adjusted   clutter free environment maintained   assistive device/personal items within reach  Taken 10/4/2023 0400 by Claudia Busby RN  Safety Promotion/Fall Prevention:   safety round/check completed   lighting adjusted   clutter free environment maintained   assistive device/personal items within reach  Taken 10/4/2023 0200 by Claudia Busby RN  Safety Promotion/Fall Prevention:   safety round/check completed   lighting adjusted   clutter free environment maintained   assistive device/personal items within reach  Taken 10/4/2023 0000 by Claudia Busby RN  Safety Promotion/Fall Prevention:   safety round/check completed   lighting adjusted   assistive device/personal items within reach   clutter free environment maintained  Taken 10/3/2023 2200 by Claudia Busby RN  Safety Promotion/Fall Prevention:   safety round/check completed   lighting adjusted   clutter free environment maintained   assistive device/personal items within reach  Taken 10/3/2023 1915 by Claudia Busby RN  Safety Promotion/Fall Prevention:   safety round/check completed   lighting adjusted   clutter free environment maintained   assistive device/personal items within reach  Intervention: Prevent Infection  Recent Flowsheet Documentation  Taken 10/4/2023 0600 by Claudia Busby RN  Infection Prevention:   single patient room provided   hand hygiene promoted  Taken 10/3/2023 1915 by Claudia Busby RN  Infection Prevention:   single patient room provided   rest/sleep  promoted   hand hygiene promoted  Goal: Optimal Comfort and Wellbeing  Outcome: Ongoing, Progressing  Intervention: Provide Person-Centered Care  Recent Flowsheet Documentation  Taken 10/3/2023 1915 by Claudia Busby RN  Trust Relationship/Rapport:   care explained   questions answered  Goal: Readiness for Transition of Care  Outcome: Ongoing, Progressing   Goal Outcome Evaluation:      plan of Care Reviewed With: patient  Progress: no change  Outcome Evaluation: Pt alert and oriented, wife at bedside; gluteal and right heel wounds noted, F/c in place, patent, draining clear yellow urine. IV abx infusing. Consent for abscess intervention this am. EKG and labs obtained. NPO since 0000, call light within reach.

## 2023-10-04 NOTE — SIGNIFICANT NOTE
10/04/23 0942   OTHER   Discipline physical therapist   Rehab Time/Intention   Session Not Performed patient unavailable for evaluation  (Pt off floor for I&D procedure; f/u 10/5 if appropriate. Nurse aware.)   Recommendation   PT - Next Appointment 10/05/23

## 2023-10-04 NOTE — PLAN OF CARE
Goal Outcome Evaluation:           Progress: improving  Outcome Evaluation: Patient alert and oriented, on 2L nasal cannula to maintain sats >90, abscess I&D completed today [facility] pain controlled, tolerating renal diet, ambulated with maximum of 2 for assistance patient has right sided deficit from prior stroke, urology consult called this morning, light to bedside drainage, vitals stable, plan of care continued      Dressing changed wet to dry and incision site packed with moist kerlix and dried bandage applied

## 2023-10-04 NOTE — CONSULTS
FIRST UROLOGY CONSULT      Patient Identification:  NAME:  Dilip Verma  Age:  73 y.o.   Sex:  male   :  1950   MRN:  [id number]       Chief complaint: Difficulty urinating    History of present illness: This is a 73-year-old gentleman former patient of Dr. Polo Velasco who has not been seen in probably a year and a half.  He has a history of retention in the past and at one point required a fairly long course of a Hopkins catheter and then was put on intermittent catheterization but stopped doing intermittent catheterization after he felt like he was voiding correctly.  The last time he saw Dr. Dupont was in 2022 and at that time his PVR was less than 100 cc with a PSA level of 0.9.  He was not put on any alpha-blocker therapy at the time and his notes from what I could ascertain was suggesting this more was more of a BPH issue.  He comes in now with gluteal pain was found to have a gluteal abscess which was drained this afternoon.  Also the patient earlier when he was in the preoperative holding area so this dictation is occurring after surgery.  He states to me that over the last year he has been doing fine his voiding pattern is stable his stream is okay he does not have to strain to urinate it is on the weaker side but he has no significant frequency and urgency and as far as he could ascertain he is voiding well.  I catheter was put in last night I just cannot find any documentation of how much urine was obtained when the catheter was put in but he is promptly put out about 1500 cc after he was admitted.  He has been having chronic lower back pain and has had known documented spinal stenosis from what I could ascertain.  He is also a diabetic with questionable control.      Past medical history:  Past Medical History:   Diagnosis Date    AMENA (acute kidney injury) 2020    CORI positive     Anemia     Anxiety     Ataxia     Atypical chest pain 2022    SEEN AT [facility] ER     "Balance disorder     Cataract     BILATERAL, S/P EXTRACTION    Cervical radiculopathy 11/11/2019    SEEN AT  [facility] ER    Cervical spinal cord compression     Chronic diastolic (congestive) heart failure     Chronic kidney disease     STAGE 3, FOLLOWED BY DR. VIVAR    Chronic pancreatitis     Closed left subtrochanteric femur fracture 12/01/2020    ADMITTED TO [facility]    Closed nondisplaced intertrochanteric fracture of left femur 12/01/2020    ADMITTED TO [facility]    Colon polyps     FOLLOWED BY DR. AVTAR JEONG    Constipation     Contracture, right hand     Coronary artery disease     CABG 7/2019    COVID-19 07/2022    DDD (degenerative disc disease), cervical     Diabetes mellitus, type II     IDDM    Diabetic retinopathy     Dysphagia     Elevated brain natriuretic peptide (BNP) level 08/2014    Elevated LFTs 03/2021    Erectile dysfunction     Foot drop     Fuchs' corneal dystrophy of right eye     Glaucoma     BILATERAL    History of alcohol abuse     Hyperlipidemia     Hypersomnia     Hypertension     Hypertensive urgency 02/25/2020    ADMITTED TO [facility]    Insomnia     Kidney stones     LV dysfunction 06/2016    Lyme disease     Lymphadenopathy syndrome 05/2021    Macular edema     BILATERAL    Myocardial infarction 11/05/2019    NSTEMI, ADMITTED TO [facility]    Myocardial infarction 07/12/2019    NSTEMI, ADMITTED TO [facility]    Non-celiac gluten sensitivity     Orthostasis     Osteoporosis     Oxygen dependent     PAD (peripheral artery disease)     PNA (pneumonia) 06/2016    LEFT LOBE    Polyneuropathy     PTSD (post-traumatic stress disorder)     Pulmonary hypertension     Pulmonary nodule     Senile ectropion of both lower eyelids 02/2022    Sepsis 12/16/2020    D/T UTI, ADMITTED TO [facility]    Sleep apnea     STATES DOESN'T USE BIPAP OR CPAP    Spinal stenosis in cervical region     SEVERE-LIMITED ROM    Stroke 2012    \"slight stroke\"    Syncope and collapse 07/06/2019    ADMITTED TO [facility]    Urinary retention 04/05/2021    SEEN " AT [facility] ER    Vitamin D deficiency        Past surgical history:  Past Surgical History:   Procedure Laterality Date    CARDIAC CATHETERIZATION N/A 07/15/2019    Procedure: Coronary angiography;  Surgeon: Carrie Price MD;  Location:  [facility];  Service: Cardiovascular    CARDIAC CATHETERIZATION N/A 07/15/2019    Procedure: Left Heart Cath;  Surgeon: Carrie Price MD;  Location:  [facility];  Service: Cardiovascular    CARDIAC CATHETERIZATION N/A 07/15/2019    Procedure: Left ventriculography;  Surgeon: Carrie Price MD;  Location:  [facility];  Service: Cardiovascular    CARDIAC CATHETERIZATION  07/15/2019    Procedure: Functional Flow Ecru;  Surgeon: Carrie Price MD;  Location:  [facility];  Service: Cardiovascular    CARDIAC CATHETERIZATION N/A 11/06/2019    Procedure: Right and Left Heart Cath;  Surgeon: Mya Smith MD;  Location: [facility] CATH [facility]VASIVE LOCATION;  Service: Cardiovascular    CARDIAC CATHETERIZATION N/A 11/06/2019    Procedure: Coronary angiography;  Surgeon: Mya Smith MD;  Location: [facility] CATH [facility] LOC[facility];  Service: Cardiovascular    CARDIAC SURGERY      CATARACT EXTRACTION Left 2014    CATARACT EXTRACTION Right 11/2016    PHACO/IOL, DR. LEN DENTON    COLONOSCOPY N/A 03/16/2023    ENTIRE COLON WNL, RESCOPE IN 5 YRS, DR. LINDA LIZARRAGA AT [facility]    COLONOSCOPY W/ POLYPECTOMY N/A 01/02/2015    A FEW DIVERTICULA IN SIGMOID, 6 MM TUBULOVILLOUS ADENOMA POLYP IN RECTUM, SMALL HEMORRHOIDS, MELANOSIS COLI, DR. AVTAR JEONG AT [place] [facility]COPY    CORONARY ARTERY BYPASS GRAFT N/A 07/18/2019    Procedure: INTRAOPERATIVE SHOAIB; STERNOTOMY CORONARY ARTERY BYPASS x 3  USING LEFT INTERNAL MAMMARY ARTERY GRAFT UTILIZING ENDOSCOPICALLY HARVESTED RIGHT GREATER SAPHENOUS VEIN AND PRP.;  Surgeon: Bill Devi MD;  Location: [facility] OR;  Service: Cardiothoracic    CYSTOSCOPY BLADDER STONE LITHOTRIPSY N/A      DENTAL PROCEDURE Bilateral     3 surgeries inder implants    FEMUR IM NAILING/RODDING Left 12/03/2020    Procedure: LEFT HIP INTRAMEDULLARY NAIL;  Surgeon: Niraj Ravi MD;  Location: [facility] OR;  Service: Orthopedic Spine;  Laterality: Left;    INGUINAL HERNIA REPAIR Bilateral     TOENAIL EXCISION  06/2022    TONSILLECTOMY Bilateral     TOTAL HIP ARTHROPLASTY REVISION Left 01/11/2022    Procedure: TOTAL HIP ARTHROPLASTY REVISION- POSTERIOR;  Surgeon: Jurgen Candelaria II, MD;  Location: [facility] OR;  Service: Orthopedics;  Laterality: Left;    VASECTOMY N/A        Allergies:  Ace inhibitors, Losartan, Seroquel [quetiapine], Xanax [alprazolam], Amlodipine, Ativan [lorazepam], Baclofen, Minoxidil, and Tetracycline    Home medications:  Medications Prior to Admission   Medication Sig Dispense Refill Last Dose    aspirin 81 MG EC tablet Take 1 tablet by mouth Daily.       brimonidine (ALPHAGAN) 0.2 % ophthalmic solution Administer 1 drop to both eyes 2 (Two) Times a Day.       Cholecalciferol (Vitamin D-3) 125 MCG (5000 UT) tablet Take 1 tablet by mouth Daily.       Docusate Sodium 100 MG capsule Take 1 tablet by mouth Daily.       Insulin Glargine, 2 Unit Dial, (TOUJEO) 300 UNIT/ML solution pen-injector injection Inject 30 Units under the skin into the appropriate area as directed Daily.       insulin lispro (humaLOG) 100 UNIT/ML injection Inject 0-14 Units under the skin into the appropriate area as directed 3 (Three) Times a Day Before Meals. (Patient taking differently: Inject 0-14 Units under the skin into the appropriate area as directed 3 (Three) Times a Day Before Meals. SLIDING SCALE)  12     latanoprost (XALATAN) 0.005 % ophthalmic solution Administer 1 drop to both eyes every night at bedtime.       MAGNESIUM PO Take 1 tablet by mouth Daily.       sildenafil (REVATIO) 20 MG tablet TAKE 1-3 TABLETS 1 HR PRIOR TO SEXUAL ACTIVITY (Patient taking differently: Take 1 tablet by mouth As Needed.)  20 tablet 0     testosterone (ANDROGEL) 25 MG/2.5GM (1%) gel gel Place 25 mg on the skin as directed by provider Daily.       traMADol (ULTRAM) 50 MG tablet Take 1 tablet by mouth Every 8 (Eight) Hours As Needed for Severe Pain. (Patient taking differently: Take 1 tablet by mouth At Night As Needed for Severe Pain.) 40 tablet 0     Unable to find Apply 1 each topically to the appropriate area as directed 1 (One) Time. Cbd pain stick       bumetanide (BUMEX) 1 MG tablet Take 0.5 tablets by mouth 2 (Two) Times a Day. Holding Since 9/30/23       mometasone (ELOCON) 0.1 % ointment Apply 1 application topically to the appropriate area as directed Daily. Do not exceed 1 week, needs to be off 1 week and can restart 15 g 3        Hospital medications:  [MAR Hold] aspirin, 81 mg, Oral, Daily  [MAR Hold] brimonidine, 1 drop, Both Eyes, BID  [MAR Hold] bumetanide, 1 mg, Intravenous, Once  [MAR Hold] bumetanide, 0.5 mg, Oral, BID  [MAR Hold] cholecalciferol, 5,000 Units, Oral, Q24H  [MAR Hold] docusate sodium, 100 mg, Oral, Daily  famotidine, 20 mg, Intravenous, Once  [MAR Hold] insulin glargine, 1-200 Units, Subcutaneous, Nightly - Glucommander  insulin lispro, 1-200 Units, Subcutaneous, 4x Daily With Meals & Nightly  [MAR Hold] latanoprost, 1 drop, Both Eyes, Nightly  [MAR Hold] magnesium (as) gluconate, 27 mg, Oral, Daily  piperacillin-tazobactam, 3.375 g, Intravenous, Q8H  [MAR Hold] saccharomyces boulardii, 250 mg, Oral, BID  [MAR Hold] senna-docusate sodium, 2 tablet, Oral, BID  [MAR Hold] sodium chloride, 10 mL, Intravenous, Q12H  sodium chloride, 3 mL, Intravenous, Q12H  [MAR Hold] testosterone, 25 mg, Transdermal, Daily      lactated ringers, 9 mL/hr, Last Rate: 125 mL/hr (10/04/23 1201)        [MAR Hold] acetaminophen **OR** [MAR Hold] acetaminophen **OR** [MAR Hold] acetaminophen    [MAR Hold] senna-docusate sodium **AND** [MAR Hold] polyethylene glycol **AND** [MAR Hold] bisacodyl **AND** [MAR Hold] bisacodyl     [MAR Hold] calcium carbonate    [MAR Hold] Calcium Replacement - Follow Nurse / BPA Driven Protocol    dextrose    dextrose    diphenhydrAMINE    droperidol **OR** droperidol    ePHEDrine    fentanyl    glucagon (human recombinant)    hydrALAZINE    HYDROcodone-acetaminophen    HYDROmorphone    insulin lispro    ipratropium-albuterol    labetalol    lidocaine    [MAR Hold] Magnesium Standard Dose Replacement - Follow Nurse / BPA Driven Protocol    [MAR Hold] melatonin    naloxone    [MAR Hold] ondansetron **OR** [MAR Hold] ondansetron    ondansetron    oxyCODONE-acetaminophen    [MAR Hold] Phosphorus Replacement - Follow Nurse / BPA Driven Protocol    Potassium Replacement - Follow Nurse / BPA Driven Protocol    promethazine **OR** promethazine    [MAR Hold] sodium chloride    sodium chloride    [MAR Hold] sodium chloride    [MAR Hold] traMADol    Family history:  Family History   Problem Relation Age of Onset    Heart disease Mother     Hypertension Mother     Hyperlipidemia Mother     Depression Mother     Cancer Mother         uterine    Arrhythmia Mother     Uterine cancer Mother     Hyperlipidemia Father     Heart disease Father     Hypertension Father     Kidney disease Father     Thyroid disease Father     Heart failure Father     Depression Sister     Alcohol abuse Brother     Thyroid disease Paternal Uncle     Lung disease Paternal Uncle     Stroke Maternal Grandmother     Glaucoma Maternal Grandmother     Heart attack Paternal Grandmother     Stroke Paternal Grandmother     Alcohol abuse Paternal Grandfather     Malig Hyperthermia Neg Hx        Social history:  Social History     Tobacco Use    Smoking status: Former     Packs/day: 0.75     Years: 16.00     Pack years: 12.00     Types: Cigarettes     Quit date:      Years since quittin.7     Passive exposure: Past    Smokeless tobacco: Never    Tobacco comments:     Start age:39/Stopping age:47, 3/4 PPD   Vaping Use    Vaping Use: Never used    Substance Use Topics    Alcohol use: Not Currently     Comment: alcohol abuse, none x 25 years / caffeine use - 1 cup daily    Drug use: No       Review of systems:    Negative 12-system ROS except for the following: No current obstructive or irritable urinary symptoms as a baseline over the last few months.  Gross hematuria.  No UTI      Objective:  TMax 24 hours:   Temp (24hrs), Av.7 °F (37.1 °C), Min:97.9 °F (36.6 °C), Max:99.7 °F (37.6 °C)      Vitals Ranges:   Temp:  [97.9 °F (36.6 °C)-99.7 °F (37.6 °C)] 98.7 °F (37.1 °C)  Heart Rate:  [75-90] 86  Resp:  [16-20] 16  BP: (134-183)/(65-89) 148/70    Intake/Output Last 3 shifts:  I/O last 3 completed shifts:  In: 290 [P.O.:240; IV Piggyback:50]  Out: 1550 [Urine:1550]     Physical Exam:       General Appearance:    Alert, cooperative, in no acute distress   Head:    Normocephalic, without obvious abnormality, atraumatic   Eyes:          PERRL, conjunctivae and corneas clear   Ears:    Normal external inspection   Throat:   No oral lesions, oral mucosa moist   Neck:   Supple, no LAD, trachea midline   Back:     No CVA tenderness   Lungs:     Respirations unlabored, symmetric excursion    Heart:    RRR, intact peripheral pulses   Abdomen:   Soft and nondistended   :  Penis normal testes normal scrotum normal I did not do a rectal exam due to his gluteal abscess   Extremities:   No edema, no deformity   Skin:   No bleeding, bruising or rashes   Neuro/Psych:   Orientation intact, mood/affect pleasant, no focal findings       Results review:   I reviewed the patient's new clinical results.    Data review:  Lab Results (last 24 hours)       Procedure Component Value Units Date/Time    Anaerobic Culture - Wound, Perirectal Abscess [[id number]] Collected: 10/04/23 1233    Specimen: Wound from Perirectal Abscess Updated: 10/04/23 1248    Wound Culture - Wound, Perirectal Abscess [[id number]] Collected: 10/04/23 1233    Specimen: Wound from Perirectal Abscess Updated:  10/04/23 1248    POC Glucose Once [[id number]]  (Abnormal) Collected: 10/04/23 0941    Specimen: Blood Updated: 10/04/23 0942     Glucose 256 mg/dL     POC Glucose Once [[id number]]  (Abnormal) Collected: 10/04/23 0819    Specimen: Blood Updated: 10/04/23 0820     Glucose 209 mg/dL     Comprehensive Metabolic Panel [[id number]]  (Abnormal) Collected: 10/04/23 0641    Specimen: Blood Updated: 10/04/23 0727     Glucose 233 mg/dL      BUN 14 mg/dL      Creatinine 0.92 mg/dL      Sodium 139 mmol/L      Potassium 3.9 mmol/L      Chloride 98 mmol/L      CO2 30.3 mmol/L      Calcium 9.0 mg/dL      Total Protein 6.6 g/dL      Albumin 3.0 g/dL      ALT (SGPT) 20 U/L      AST (SGOT) 15 U/L      Alkaline Phosphatase 69 U/L      Total Bilirubin 0.5 mg/dL      Globulin 3.6 gm/dL      A/G Ratio 0.8 g/dL      BUN/Creatinine Ratio 15.2     Anion Gap 10.7 mmol/L      eGFR 87.3 mL/min/1.73     Narrative:      GFR Normal >60  Chronic Kidney Disease <60  Kidney Failure <15    The GFR formula is only valid for adults with stable renal function between ages 18 and 70.    CBC & Differential [[id number]]  (Abnormal) Collected: 10/04/23 0641    Specimen: Blood Updated: 10/04/23 0709    Narrative:      The following orders were created for panel order CBC & Differential.  Procedure                               Abnormality         Status                     ---------                               -----------         ------                     CBC Auto Differential[[id number]]        Abnormal            Final result                 Please view results for these tests on the individual orders.    CBC Auto Differential [[id number]]  (Abnormal) Collected: 10/04/23 0641    Specimen: Blood Updated: 10/04/23 0709     WBC 14.07 10*3/mm3      RBC 4.96 10*6/mm3      Hemoglobin 15.3 g/dL      Hematocrit 45.6 %      MCV 91.9 fL      MCH 30.8 pg      MCHC 33.6 g/dL      RDW 12.1 %      RDW-SD 40.6 fl      MPV 10.4 fL      Platelets 300 10*3/mm3      Neutrophil  "% 84.9 %      Lymphocyte % 7.4 %      Monocyte % 6.6 %      Eosinophil % 0.2 %      Basophil % 0.2 %      Immature Grans % 0.7 %      Neutrophils, Absolute 11.94 10*3/mm3      Lymphocytes, Absolute 1.04 10*3/mm3      Monocytes, Absolute 0.93 10*3/mm3      Eosinophils, Absolute 0.03 10*3/mm3      Basophils, Absolute 0.03 10*3/mm3      Immature Grans, Absolute 0.10 10*3/mm3      nRBC 0.0 /100 WBC     POC Glucose Once [[id number]]  (Abnormal) Collected: 10/03/23 2206    Specimen: Blood Updated: 10/03/23 2207     Glucose 267 mg/dL     Hemoglobin A1c [[id number]]  (Abnormal) Collected: 10/03/23 1245    Specimen: Blood Updated: 10/03/23 1904     Hemoglobin A1C 8.90 %     Narrative:      Hemoglobin A1C Ranges:    Increased Risk for Diabetes  5.7% to 6.4%  Diabetes                     >= 6.5%  Diabetic Goal                < 7.0%    TSH Rfx On Abnormal To Free T4 [[id number]]  (Normal) Collected: 10/03/23 1814    Specimen: Blood Updated: 10/03/23 1853     TSH 0.682 uIU/mL     Procalcitonin [[id number]]  (Normal) Collected: 10/03/23 1814    Specimen: Blood Updated: 10/03/23 1853     Procalcitonin 0.09 ng/mL     Narrative:      As a Marker for Sepsis (Non-Neonates):    1. <0.5 ng/mL represents a low risk of severe sepsis and/or septic shock.  2. >2 ng/mL represents a high risk of severe sepsis and/or septic shock.    As a Marker for Lower Respiratory Tract Infections that require antibiotic therapy:    PCT on Admission    Antibiotic Therapy       6-12 Hrs later    >0.5                Strongly Recommended  >0.25 - <0.5        Recommended   0.1 - 0.25          Discouraged              Remeasure/reassess PCT  <0.1                Strongly Discouraged     Remeasure/reassess PCT    As 28 day mortality risk marker: \"Change in Procalcitonin Result\" (>80% or <=80%) if Day 0 (or Day 1) and Day 4 values are available. Refer to http://[email or web address]-pct-calculator.com    Change in PCT <=80%  A decrease of PCT levels below or equal to 80% defines " a positive change in PCT test result representing a higher risk for 28-day all-cause mortality of patients diagnosed with severe sepsis for septic shock.    Change in PCT >80%  A decrease of PCT levels of more than 80% defines a negative change in PCT result representing a lower risk for 28-day all-cause mortality of patients diagnosed with severe sepsis or septic shock.       CK [[id number]]  (Normal) Collected: 10/03/23 1814    Specimen: Blood Updated: 10/03/23 1849     Creatine Kinase 71 U/L     Magnesium [[id number]]  (Normal) Collected: 10/03/23 1814    Specimen: Blood Updated: 10/03/23 1849     Magnesium 1.9 mg/dL     Lactic Acid, Plasma [[id number]]  (Normal) Collected: 10/03/23 1814    Specimen: Blood Updated: 10/03/23 1846     Lactate 1.6 mmol/L     POC Glucose Once [[id number]]  (Abnormal) Collected: 10/03/23 1844    Specimen: Blood Updated: 10/03/23 1845     Glucose 266 mg/dL     aPTT [[id number]]  (Normal) Collected: 10/03/23 1814    Specimen: Blood Updated: 10/03/23 1838     PTT 29.6 seconds     Protime-INR [[id number]]  (Abnormal) Collected: 10/03/23 1814    Specimen: Blood Updated: 10/03/23 1838     Protime 14.6 Seconds      INR 1.13    BNP [[id number]]  (Abnormal) Collected: 10/03/23 1245    Specimen: Blood Updated: 10/03/23 1814     proBNP 3,843.0 pg/mL     Narrative:      This assay is used as an aid in the diagnosis of individuals suspected of having heart failure. It can be used as an aid in the diagnosis of acute decompensated heart failure (ADHF) in patients presenting with signs and symptoms of ADHF to the emergency department (ED). In addition, NT-proBNP of <300 pg/mL indicates ADHF is not likely.    POC Glucose Once [[id number]]  (Abnormal) Collected: 10/03/23 1650    Specimen: Blood Updated: 10/03/23 1652     Glucose 270 mg/dL     Urinalysis With Culture If Indicated - Indwelling Urethral Catheter [[id number]]  (Abnormal) Collected: 10/03/23 1341    Specimen: Urine from Indwelling Urethral Catheter  Updated: 10/03/23 1358     Color, UA Yellow     Appearance, UA Clear     pH, UA 5.5     Specific Gravity, UA 1.017     Glucose,  mg/dL (2+)     Ketones, UA 40 mg/dL (2+)     Bilirubin, UA Negative     Blood, UA Negative     Protein, UA Trace     Leuk Esterase, UA Negative     Nitrite, UA Negative     Urobilinogen, UA 1.0 E.U./dL    Narrative:      In absence of clinical symptoms, the presence of pyuria, bacteria, and/or nitrites on the urinalysis result does not correlate with infection.  Urine microscopic not indicated.    Comprehensive Metabolic Panel [[id number]]  (Abnormal) Collected: 10/03/23 1245    Specimen: Blood Updated: 10/03/23 1315     Glucose 263 mg/dL      BUN 12 mg/dL      Creatinine 0.98 mg/dL      Sodium 135 mmol/L      Potassium 4.3 mmol/L      Chloride 92 mmol/L      CO2 28.5 mmol/L      Calcium 9.3 mg/dL      Total Protein 7.2 g/dL      Albumin 3.2 g/dL      ALT (SGPT) 23 U/L      AST (SGOT) 11 U/L      Alkaline Phosphatase 73 U/L      Total Bilirubin 0.6 mg/dL      Globulin 4.0 gm/dL      A/G Ratio 0.8 g/dL      BUN/Creatinine Ratio 12.2     Anion Gap 14.5 mmol/L      eGFR 80.9 mL/min/1.73     Narrative:      GFR Normal >60  Chronic Kidney Disease <60  Kidney Failure <15    The GFR formula is only valid for adults with stable renal function between ages 18 and 70.    CBC & Differential [[id number]]  (Abnormal) Collected: 10/03/23 1245    Specimen: Blood Updated: 10/03/23 1255    Narrative:      The following orders were created for panel order CBC & Differential.  Procedure                               Abnormality         Status                     ---------                               -----------         ------                     CBC Auto Differential[[id number]]        Abnormal            Final result                 Please view results for these tests on the individual orders.    CBC Auto Differential [[id number]]  (Abnormal) Collected: 10/03/23 1245    Specimen: Blood Updated: 10/03/23  1255     WBC 12.39 10*3/mm3      RBC 5.18 10*6/mm3      Hemoglobin 16.2 g/dL      Hematocrit 48.2 %      MCV 93.1 fL      MCH 31.3 pg      MCHC 33.6 g/dL      RDW 12.2 %      RDW-SD 41.7 fl      MPV 10.3 fL      Platelets 271 10*3/mm3      Neutrophil % 87.6 %      Lymphocyte % 6.0 %      Monocyte % 4.8 %      Eosinophil % 0.4 %      Basophil % 0.3 %      Immature Grans % 0.9 %      Neutrophils, Absolute 10.85 10*3/mm3      Lymphocytes, Absolute 0.74 10*3/mm3      Monocytes, Absolute 0.60 10*3/mm3      Eosinophils, Absolute 0.05 10*3/mm3      Basophils, Absolute 0.04 10*3/mm3      Immature Grans, Absolute 0.11 10*3/mm3      nRBC 0.0 /100 WBC              Imaging:  Imaging Results (Last 24 Hours)       Procedure Component Value Units Date/Time    XR Chest 1 View [[id number]] Collected: 10/04/23 0729     Updated: 10/04/23 0733    Narrative:      XR CHEST 1 VW-     HISTORY:   Urinary retention, on antibiotics, missed Bumex, history of  CHF.     COMPARISON: Chest radiograph 6/4/2023     FINDINGS:    A single view of the chest was obtained. The cardiac silhouette is  normal in size. There are postsurgical changes from CABG. There is  central pulmonary venous congestion without overt pulmonary edema. There  are low lung volumes. There is no consolidation. There is no large  pleural effusion. Sternotomy wires are present. There is multilevel  degenerative disc disease.     This report was finalized on 10/4/2023 7:30 AM by Dr. Kelle Suh M.D  on Workstation: [id number]                  Assessment:       Gluteal abscess    CAD (coronary artery disease)    Hx of CABG    Chronic diastolic heart failure    CSA (central sleep apnea)    HTN (hypertension)    History of stroke    CKD (chronic kidney disease) stage 3, GFR 30-59 ml/min    Urinary retention    Type 2 diabetes mellitus with diabetic neuropathy, with long-term current use of insulin    Cervical stenosis of spine    Spinal stenosis, lumbar region, without neurogenic  claudication    Cellulitis    Medically noncompliant    Urinary retention with a history of prior bladder outlet obstruction retention required intermittent catheterization.    Plan:     Urinary retention possible multifactorial but very likelihood this could be a neurogenic bladder from sensory neuropathic damage due to diabetes with development of an atonic bladder but cannot rule out the possibility of cauda equina syndrome.  Suspect he has some baseline BPH issues as well.  We will start him on tamsulosin and ultimately he will need to have urodynamics in our office.  I guess the big decision at this point is whether neurosurgery thinks there is a possibility he could have cauda equina syndrome and needs to have decompression of his cord.    Sukh Jaime MD  10/04/23  12:51 EDT

## 2023-10-04 NOTE — ANESTHESIA PROCEDURE NOTES
Airway  Date/Time: 10/4/2023 12:09 PM  Airway not difficult    General Information and Staff    Patient location during procedure: OR  CRNA/CAA: Melani Pike, BOAZ    Indications and Patient Condition    Preoxygenated: yes  Mask difficulty assessment: 2 - vent by mask + OA or adjuvant +/- NMBA    Final Airway Details  Final airway type: endotracheal airway      Successful airway: ETT  Cuffed: yes   Successful intubation technique: video laryngoscopy  Facilitating devices/methods: intubating stylet  Endotracheal tube insertion site: oral  Blade: CMAC  Blade size: D  ETT size (mm): 7.5  Cormack-Lehane Classification: grade I - full view of glottis  Placement verified by: capnometry   Measured from: lips  ETT/EBT  to lips (cm): 23  Number of attempts at approach: 2  Assessment: lips, teeth, and gum same as pre-op    Additional Comments  Grade 3 view with raya 3. Switched to [facility]

## 2023-10-04 NOTE — PLAN OF CARE
Problem: Adult Inpatient Plan of Care  Goal: Plan of Care Review  Outcome: Ongoing, Progressing  Flowsheets (Taken 10/3/2023 1800)  Progress: no change  Plan of Care Reviewed With: patient  Outcome Evaluation: Pt arrived to [facility] from ED this evening. Wounds documented under LDA. F/c in place, draining clear yellow urine. encouraged to turn Q2. IV saline locked. Possible abscess drainage tomorrow.   Goal Outcome Evaluation:  Plan of Care Reviewed With: patient        Progress: no change  Outcome Evaluation: Pt arrived to [facility] from ED this evening. Wounds documented under LDA. F/c in place, draining clear yellow urine. encouraged to turn Q2. IV saline locked. Possible abscess drainage tomorrow.

## 2023-10-04 NOTE — OP NOTE
Colorectal & General Surgery  Operative Report    Patient: Dilip Verma  YOB: 1950  MRN: [id number]  DATE OF PROCEDURE: 10/04/23     PREOPERATIVE DIAGNOSIS:  Right gluteal abscess, complex    POSTOPERATIVE DIAGNOSIS:  Same    PROCEDURE:  Incision and drainage of right gluteal abscess  Debridement of skin and subcutaneous tissue of the right buttock measuring 3 x 10 x 3 cm  Anorectal examination under anesthesia    FINDINGS:  3 x 4 cm necrotic eschar on the right buttock approximately 2 cm from anoderm.  Approximately 200 mL of purulent drainage.  Multiloculated abscess.  Necrotic skin and subcutaneous tissue surrounding the abscess cavity was debrided back to healthy bleeding tissue.    SURGEON:  Scout Villatoro MD    ASSISTANT:  Assistant: Celestine Odonnell CSA was responsible for performing the following activities: Retraction, Suction, and Irrigation and their skilled assistance was necessary for the success of this case.      ANESTHESIA:  General endotracheal    EBL:  5 mL    SPECIMEN:  Culture swab of right buttock abscess    OPERATIVE DESCRIPTION:  The patient was brought to the operating room under the care of the nursing staff.  General anesthesia was induced on the stretcher.  The patient was then placed in the prone position.  He was prepped and draped in usual sterile fashion.    The eschar on the right buttock was circumferentially excised with the Bovie electrocautery.  There was an immediate rush of 200 cc of purulent drainage.  Culture swab was obtained.  The cavity was cleared of all purulent drainage and fibrinous material.  The skin and subcutaneous tissue surrounding the abscess cavity was obviously necrotic and debrided back sharply with the Bovie electrocautery to healthy bleeding tissue.  The wound was copiously irrigated.    I then performed an anorectal examination under anesthesia.  Digital rectal exam demonstrated no masses.  The Hill-Montilla retractor was inserted and  a circumferential examination of the anal mucosa and anoderm was performed.  There were no intra anal abnormalities, including fistula.     The wound cavity was then packed with saline soaked Kerlix.  The wound cavity measured 3 x 10 x 3 cm.  An ABD pad and mesh pants were applied.    All needle, sponge, and instrument counts were correct at the end of the case.    The patient tolerated the procedure well and was transferred to the postanesthesia care unit in stable condition.    Scout Villatoro M.D.  Colorectal & General Surgery  [facility] [address], [address]  [place], [place], [address]  P: [phone]  F: [phone]

## 2023-10-04 NOTE — PROGRESS NOTES
Colorectal & General Surgery  Progress Note    Patient: Dilip Verma  YOB: 1950  MRN: [id number]      Assessment  Dilip Verma is a 73 y.o. male with perianal and buttock abscess.  We will proceed to the operating room today for examination under anesthesia, incision and drainage of abscess.    Subjective  Resting comfortably this morning.  No events overnight.    Objective    Vitals:    10/04/23 0454   BP: 165/77   Pulse: 78   Resp: 20   Temp: 99 °F (37.2 °C)   SpO2: 95%       Physical Exam  Constitutional: Well-developed well-nourished, no acute distress  Neck: Supple, trachea midline  Respiratory: No increased work of breathing, Symmetric excursion  Cardiovascular: Well pefursed, no jugular venous distention evident   Abdominal: Soft, non-tender, non-distended  Skin: Warm, dry, no rash on visualized skin surfaces  Psychiatric: Alert and oriented ×3, normal affect   Perianal: Right-sided abscess with necrotic tissue.    Laboratory Results  No new data    Radiology  No new data         Scout Villatoro MD  Colorectal & General Surgery  [facility]    [address] [place] [address], [address]  [place], [place], [address]  P: [phone]  F: [phone]

## 2023-10-05 ENCOUNTER — APPOINTMENT (OUTPATIENT)
Dept: MRI IMAGING | Facility: HOSPITAL | Age: 73
End: 2023-10-05
Payer: MEDICARE

## 2023-10-05 LAB
ALBUMIN SERPL-MCNC: 2.8 G/DL (ref 3.5–5.2)
ALBUMIN/GLOB SERPL: 0.9 G/DL
ALP SERPL-CCNC: 53 U/L (ref 39–117)
ALT SERPL W P-5'-P-CCNC: 22 U/L (ref 1–41)
ANION GAP SERPL CALCULATED.3IONS-SCNC: 6.2 MMOL/L (ref 5–15)
AST SERPL-CCNC: 20 U/L (ref 1–40)
BASOPHILS # BLD AUTO: 0.06 10*3/MM3 (ref 0–0.2)
BASOPHILS NFR BLD AUTO: 0.6 % (ref 0–1.5)
BILIRUB SERPL-MCNC: 0.5 MG/DL (ref 0–1.2)
BUN SERPL-MCNC: 15 MG/DL (ref 8–23)
BUN/CREAT SERPL: 12 (ref 7–25)
CALCIUM SPEC-SCNC: 8.5 MG/DL (ref 8.6–10.5)
CHLORIDE SERPL-SCNC: 101 MMOL/L (ref 98–107)
CO2 SERPL-SCNC: 33.8 MMOL/L (ref 22–29)
CREAT SERPL-MCNC: 1.25 MG/DL (ref 0.76–1.27)
DEPRECATED RDW RBC AUTO: 43.1 FL (ref 37–54)
EGFRCR SERPLBLD CKD-EPI 2021: 60.4 ML/MIN/1.73
EOSINOPHIL # BLD AUTO: 0.19 10*3/MM3 (ref 0–0.4)
EOSINOPHIL NFR BLD AUTO: 1.8 % (ref 0.3–6.2)
ERYTHROCYTE [DISTWIDTH] IN BLOOD BY AUTOMATED COUNT: 12.4 % (ref 12.3–15.4)
GLOBULIN UR ELPH-MCNC: 3.1 GM/DL
GLUCOSE BLDC GLUCOMTR-MCNC: 184 MG/DL (ref 70–130)
GLUCOSE BLDC GLUCOMTR-MCNC: 218 MG/DL (ref 70–130)
GLUCOSE BLDC GLUCOMTR-MCNC: 233 MG/DL (ref 70–130)
GLUCOSE BLDC GLUCOMTR-MCNC: 249 MG/DL (ref 70–130)
GLUCOSE SERPL-MCNC: 201 MG/DL (ref 65–99)
HCT VFR BLD AUTO: 39.9 % (ref 37.5–51)
HGB BLD-MCNC: 13.6 G/DL (ref 13–17.7)
IMM GRANULOCYTES # BLD AUTO: 0.08 10*3/MM3 (ref 0–0.05)
IMM GRANULOCYTES NFR BLD AUTO: 0.8 % (ref 0–0.5)
LYMPHOCYTES # BLD AUTO: 1.67 10*3/MM3 (ref 0.7–3.1)
LYMPHOCYTES NFR BLD AUTO: 16 % (ref 19.6–45.3)
MAGNESIUM SERPL-MCNC: 1.9 MG/DL (ref 1.6–2.4)
MCH RBC QN AUTO: 32.1 PG (ref 26.6–33)
MCHC RBC AUTO-ENTMCNC: 34.1 G/DL (ref 31.5–35.7)
MCV RBC AUTO: 94.1 FL (ref 79–97)
MONOCYTES # BLD AUTO: 0.96 10*3/MM3 (ref 0.1–0.9)
MONOCYTES NFR BLD AUTO: 9.2 % (ref 5–12)
NEUTROPHILS NFR BLD AUTO: 7.46 10*3/MM3 (ref 1.7–7)
NEUTROPHILS NFR BLD AUTO: 71.6 % (ref 42.7–76)
NRBC BLD AUTO-RTO: 0 /100 WBC (ref 0–0.2)
PLATELET # BLD AUTO: 259 10*3/MM3 (ref 140–450)
PMV BLD AUTO: 10.5 FL (ref 6–12)
POTASSIUM SERPL-SCNC: 3.6 MMOL/L (ref 3.5–5.2)
POTASSIUM SERPL-SCNC: 4.2 MMOL/L (ref 3.5–5.2)
PROT SERPL-MCNC: 5.9 G/DL (ref 6–8.5)
QT INTERVAL: 383 MS
QTC INTERVAL: 425 MS
RBC # BLD AUTO: 4.24 10*6/MM3 (ref 4.14–5.8)
SODIUM SERPL-SCNC: 141 MMOL/L (ref 136–145)
WBC NRBC COR # BLD: 10.42 10*3/MM3 (ref 3.4–10.8)

## 2023-10-05 PROCEDURE — 85025 COMPLETE CBC W/AUTO DIFF WBC: CPT | Performed by: SURGERY

## 2023-10-05 PROCEDURE — A9577 INJ MULTIHANCE: HCPCS | Performed by: HOSPITALIST

## 2023-10-05 PROCEDURE — 84132 ASSAY OF SERUM POTASSIUM: CPT | Performed by: HOSPITALIST

## 2023-10-05 PROCEDURE — 0 GADOBENATE DIMEGLUMINE 529 MG/ML SOLUTION: Performed by: HOSPITALIST

## 2023-10-05 PROCEDURE — 25010000002 MORPHINE PER 10 MG: Performed by: HOSPITALIST

## 2023-10-05 PROCEDURE — 83735 ASSAY OF MAGNESIUM: CPT | Performed by: HOSPITALIST

## 2023-10-05 PROCEDURE — 25010000002 PIPERACILLIN SOD-TAZOBACTAM PER 1 G: Performed by: SURGERY

## 2023-10-05 PROCEDURE — 63710000001 INSULIN GLARGINE PER 5 UNITS: Performed by: SURGERY

## 2023-10-05 PROCEDURE — 99024 POSTOP FOLLOW-UP VISIT: CPT | Performed by: SURGERY

## 2023-10-05 PROCEDURE — 72158 MRI LUMBAR SPINE W/O & W/DYE: CPT

## 2023-10-05 PROCEDURE — G0378 HOSPITAL OBSERVATION PER HR: HCPCS

## 2023-10-05 PROCEDURE — 82948 REAGENT STRIP/BLOOD GLUCOSE: CPT

## 2023-10-05 PROCEDURE — 63710000001 INSULIN LISPRO (HUMAN) PER 5 UNITS: Performed by: SURGERY

## 2023-10-05 PROCEDURE — 72156 MRI NECK SPINE W/O & W/DYE: CPT

## 2023-10-05 PROCEDURE — 80053 COMPREHEN METABOLIC PANEL: CPT | Performed by: SURGERY

## 2023-10-05 PROCEDURE — 97162 PT EVAL MOD COMPLEX 30 MIN: CPT

## 2023-10-05 RX ORDER — POTASSIUM CHLORIDE 750 MG/1
40 TABLET, FILM COATED, EXTENDED RELEASE ORAL EVERY 4 HOURS
Status: COMPLETED | OUTPATIENT
Start: 2023-10-05 | End: 2023-10-05

## 2023-10-05 RX ADMIN — INSULIN GLARGINE 26 UNITS: 100 INJECTION, SOLUTION SUBCUTANEOUS at 22:47

## 2023-10-05 RX ADMIN — MORPHINE SULFATE 1 MG: 2 INJECTION, SOLUTION INTRAMUSCULAR; INTRAVENOUS at 05:42

## 2023-10-05 RX ADMIN — TAMSULOSIN HYDROCHLORIDE 0.4 MG: 0.4 CAPSULE ORAL at 08:42

## 2023-10-05 RX ADMIN — PIPERACILLIN SODIUM AND TAZOBACTAM SODIUM 3.38 G: 3; .375 INJECTION, SOLUTION INTRAVENOUS at 05:13

## 2023-10-05 RX ADMIN — LATANOPROST 1 DROP: 50 SOLUTION OPHTHALMIC at 22:07

## 2023-10-05 RX ADMIN — MORPHINE SULFATE 1 MG: 2 INJECTION, SOLUTION INTRAMUSCULAR; INTRAVENOUS at 22:26

## 2023-10-05 RX ADMIN — INSULIN LISPRO 3 UNITS: 100 INJECTION, SOLUTION INTRAVENOUS; SUBCUTANEOUS at 22:48

## 2023-10-05 RX ADMIN — INSULIN LISPRO 4 UNITS: 100 INJECTION, SOLUTION INTRAVENOUS; SUBCUTANEOUS at 12:29

## 2023-10-05 RX ADMIN — POTASSIUM CHLORIDE 40 MEQ: 750 TABLET, EXTENDED RELEASE ORAL at 12:29

## 2023-10-05 RX ADMIN — INSULIN LISPRO 3 UNITS: 100 INJECTION, SOLUTION INTRAVENOUS; SUBCUTANEOUS at 17:51

## 2023-10-05 RX ADMIN — Medication 250 MG: at 08:42

## 2023-10-05 RX ADMIN — SENNOSIDES AND DOCUSATE SODIUM 2 TABLET: 50; 8.6 TABLET ORAL at 08:43

## 2023-10-05 RX ADMIN — MORPHINE SULFATE 1 MG: 2 INJECTION, SOLUTION INTRAMUSCULAR; INTRAVENOUS at 15:00

## 2023-10-05 RX ADMIN — SENNOSIDES AND DOCUSATE SODIUM 2 TABLET: 50; 8.6 TABLET ORAL at 22:09

## 2023-10-05 RX ADMIN — Medication 10 ML: at 22:10

## 2023-10-05 RX ADMIN — Medication 10 ML: at 08:42

## 2023-10-05 RX ADMIN — BUMETANIDE 0.5 MG: 0.5 TABLET ORAL at 08:40

## 2023-10-05 RX ADMIN — ASPIRIN 81 MG: 81 TABLET, COATED ORAL at 08:40

## 2023-10-05 RX ADMIN — POTASSIUM CHLORIDE 40 MEQ: 750 TABLET, EXTENDED RELEASE ORAL at 08:40

## 2023-10-05 RX ADMIN — Medication 27 MG: at 08:43

## 2023-10-05 RX ADMIN — PIPERACILLIN SODIUM AND TAZOBACTAM SODIUM 3.38 G: 3; .375 INJECTION, SOLUTION INTRAVENOUS at 12:30

## 2023-10-05 RX ADMIN — PIPERACILLIN SODIUM AND TAZOBACTAM SODIUM 3.38 G: 3; .375 INJECTION, SOLUTION INTRAVENOUS at 22:06

## 2023-10-05 RX ADMIN — GADOBENATE DIMEGLUMINE 17 ML: 529 INJECTION, SOLUTION INTRAVENOUS at 20:37

## 2023-10-05 RX ADMIN — Medication 250 MG: at 22:06

## 2023-10-05 RX ADMIN — BUMETANIDE 0.5 MG: 0.5 TABLET ORAL at 17:45

## 2023-10-05 RX ADMIN — HYDROCODONE BITARTRATE AND ACETAMINOPHEN 1 TABLET: 7.5; 325 TABLET ORAL at 08:40

## 2023-10-05 RX ADMIN — MORPHINE SULFATE 1 MG: 2 INJECTION, SOLUTION INTRAMUSCULAR; INTRAVENOUS at 01:52

## 2023-10-05 RX ADMIN — BRIMONIDINE TARTRATE 1 DROP: 2 SOLUTION OPHTHALMIC at 22:06

## 2023-10-05 NOTE — PROGRESS NOTES
Colorectal & General Surgery  Progress Note    Patient: Dilip Verma  YOB: 1950  MRN: [id number]      Assessment  Dilip Verma is a 73 y.o. male with left gluteal abscess now postoperative day 1 from incision and drainage.  Recommend completing a 5-day course of antibiotics, okay to transition to oral on discharge.  Follow-up with me in the office in 2 weeks.  My office will arrange.  Continue wet-to-dry packing until the wound heals..    Subjective  Sore this morning.  Otherwise no acute events overnight.    Objective    Vitals:    10/05/23 0515   BP: 142/65   Pulse: 73   Resp: 16   Temp: 98.1 °F (36.7 °C)   SpO2:        Physical Exam  Constitutional: Well-developed well-nourished, no acute distress  Neck: Supple, trachea midline  Respiratory: No increased work of breathing, Symmetric excursion  Cardiovascular: Well pefursed, no jugular venous distention evident   Abdominal: Soft, non-tender, non-distended  Skin: Warm, dry, no rash on visualized skin surfaces  Psychiatric: Alert and oriented ×3, normal affect   Left buttock: Packing in place.  Minimal surrounding erythema.    Laboratory Results  I have personally reviewed WBC 10, hemoglobin 13, platelets 259.  Culture pending           Scout Villatoro MD  Colorectal & General Surgery  [facility]    [address], [address]  [place], [place], [address]  P: [phone]  F: [phone]

## 2023-10-05 NOTE — PLAN OF CARE
Goal Outcome Evaluation:     Pt. Refused OT at this time due to pain.  OT will follow up with the pt. 10-6-23

## 2023-10-05 NOTE — PLAN OF CARE
Goal Outcome Evaluation:    Patient experienced moderate to severe pain overnight.  IVP and PO PRNs administered as ordered.  ABX administered as ordered.  Patient remains A+O x 4, on 2L O2 via NC with safety protocols in place.  Spouse remains at bedside.  RN will continue to monitor patient's status.       Problem: Adult Inpatient Plan of Care  Goal: Plan of Care Review  Outcome: Ongoing, Progressing  Goal: Patient-Specific Goal (Individualized)  Outcome: Ongoing, Progressing  Goal: Absence of Hospital-Acquired Illness or Injury  Outcome: Ongoing, Progressing  Intervention: Identify and Manage Fall Risk  Recent Flowsheet Documentation  Taken 10/5/2023 0210 by Nabila Ann RN  Safety Promotion/Fall Prevention:   safety round/check completed   assistive device/personal items within reach   clutter free environment maintained   fall prevention program maintained  Taken 10/5/2023 0006 by Nabila Ann RN  Safety Promotion/Fall Prevention:   safety round/check completed   assistive device/personal items within reach   clutter free environment maintained   fall prevention program maintained  Taken 10/4/2023 2200 by Nabila Ann RN  Safety Promotion/Fall Prevention:   safety round/check completed   assistive device/personal items within reach   clutter free environment maintained   fall prevention program maintained  Taken 10/4/2023 2000 by Nabila Ann RN  Safety Promotion/Fall Prevention:   safety round/check completed   assistive device/personal items within reach   clutter free environment maintained   fall prevention program maintained  Intervention: Prevent Skin Injury  Recent Flowsheet Documentation  Taken 10/4/2023 2000 by Nabila Ann RN  Body Position:   left   side-lying  Goal: Optimal Comfort and Wellbeing  Outcome: Ongoing, Progressing  Intervention: Monitor Pain and Promote Comfort  Recent Flowsheet Documentation  Taken 10/5/2023 0542 by Nabila Ann RN  Pain Management Interventions: see  MAR  Taken 10/5/2023 0152 by Nabila Ann RN  Pain Management Interventions: see MAR  Taken 10/4/2023 2345 by Nabila Ann RN  Pain Management Interventions: see MAR  Taken 10/4/2023 2000 by Nabila Ann RN  Pain Management Interventions: medication offered but refused  Intervention: Provide Person-Centered Care  Recent Flowsheet Documentation  Taken 10/4/2023 2000 by Nabila Ann RN  Trust Relationship/Rapport:   care explained   choices provided   emotional support provided   empathic listening provided   questions answered   questions encouraged   reassurance provided   thoughts/feelings acknowledged  Goal: Readiness for Transition of Care  Outcome: Ongoing, Progressing     Problem: Diabetes Comorbidity  Goal: Blood Glucose Level Within Targeted Range  Outcome: Ongoing, Progressing     Problem: Obstructive Sleep Apnea Risk or Actual Comorbidity Management  Goal: Unobstructed Breathing During Sleep  Outcome: Ongoing, Progressing     Problem: Pain Chronic (Persistent) (Comorbidity Management)  Goal: Acceptable Pain Control and Functional Ability  Outcome: Ongoing, Progressing  Intervention: Develop Pain Management Plan  Recent Flowsheet Documentation  Taken 10/5/2023 0542 by Nabila Ann RN  Pain Management Interventions: see MAR  Taken 10/5/2023 0152 by Nabila Ann RN  Pain Management Interventions: see MAR  Taken 10/4/2023 2345 by Nabila Ann RN  Pain Management Interventions: see MAR  Taken 10/4/2023 2000 by Nabila Ann RN  Pain Management Interventions: medication offered but refused  Intervention: Optimize Psychosocial Wellbeing  Recent Flowsheet Documentation  Taken 10/4/2023 2000 by Nabila Ann RN  Diversional Activities: television     Problem: Skin Injury Risk Increased  Goal: Skin Health and Integrity  Outcome: Ongoing, Progressing  Intervention: Optimize Skin Protection  Recent Flowsheet Documentation  Taken 10/4/2023 2000 by Nabila Ann RN  Head of Bed (HOB) Positioning: HOB  elevated     Problem: Fall Injury Risk  Goal: Absence of Fall and Fall-Related Injury  Outcome: Ongoing, Progressing  Intervention: Promote Injury-Free Environment  Recent Flowsheet Documentation  Taken 10/5/2023 0210 by Nabila Ann RN  Safety Promotion/Fall Prevention:   safety round/check completed   assistive device/personal items within reach   clutter free environment maintained   fall prevention program maintained  Taken 10/5/2023 0006 by Nabila Ann RN  Safety Promotion/Fall Prevention:   safety round/check completed   assistive device/personal items within reach   clutter free environment maintained   fall prevention program maintained  Taken 10/4/2023 2200 by Nabila Ann RN  Safety Promotion/Fall Prevention:   safety round/check completed   assistive device/personal items within reach   clutter free environment maintained   fall prevention program maintained  Taken 10/4/2023 2000 by Nabila Ann RN  Safety Promotion/Fall Prevention:   safety round/check completed   assistive device/personal items within reach   clutter free environment maintained   fall prevention program maintained

## 2023-10-05 NOTE — PROGRESS NOTES
Cervical and lumbar MRI with and without contrast ordered by neurosurgery 10/5/2023.  Neurosurgery will check back on patient once these images are complete.

## 2023-10-05 NOTE — PLAN OF CARE
Goal Outcome Evaluation:  Plan of Care Reviewed With: patient      Patient is a 73 y.o. male admitted to [facility] for urinary retention and sacral wound on 10/3/2023. PMHx includes DM2, HTN, CHF, CVA, sleep apnea, ad cervical and lumbar stenosis. Patient is (I) at baseline and lives with his wife. He states prior to getting sick recently, he was normally able to ambulate well. Today, patient performed bed mobility with min/modA x2, required modA x2 for transfers, and ambulated Tank x2 5' side steps to [facility]. Pt significantly limited 2/2 pain in buttock area d/t sacral wound. He has difficulty performing transitional movements and would likely not tolerate sitting UIC. Strength, balance, and activity tolerance deficits noted. Patient may benefit from skilled PT services acutely to address functional deficits as well as improve level of independence prior to discharge. Anticipate SNF upon DC.        Anticipated Discharge Disposition (PT): skilled nursing facility, inpatient rehabilitation facility

## 2023-10-05 NOTE — THERAPY EVALUATION
Patient Name: Dilip Verma  : 1950    MRN: [id number]                              Today's Date: 10/5/2023       Admit Date: 10/3/2023    Visit Dx:     ICD-10-CM ICD-9-CM   1. Acute urinary retention  R33.8 788.29   2. Spinal stenosis, unspecified spinal region  M48.00 724.00   3. Gluteal abscess  L02.31 682.5     Patient Active Problem List   Diagnosis    Anxiety    Colon polyp    Type 2 diabetes mellitus with hyperglycemia, with long-term current use of insulin    Erectile dysfunction    Hyperlipidemia    Type 2 acute myocardial infarction    CAD (coronary artery disease)    Hx of CABG    Chronic diastolic heart failure    Hypersomnia due to medical condition    CSA (central sleep apnea)    Periodic breathing    HTN (hypertension)    History of stroke    CKD (chronic kidney disease) stage 3, GFR 30-59 ml/min    Urinary retention    Acute on chronic renal failure    Acute on chronic diastolic (congestive) heart failure    Bacteriuria    Rectal pain    Status post total replacement of hip    Vitamin D deficiency    Post-acute COVID-19 syndrome    Insulin dose changed    Arthropathy associated with neurological disorder    Personal history of colonic polyps    Type 2 diabetes mellitus with diabetic neuropathy, with long-term current use of insulin    Cervical spinal stenosis    Abscess of skin    Overweight    Cervical stenosis of spine    Spinal stenosis, lumbar region, without neurogenic claudication    Cellulitis    Gluteal abscess    Medically noncompliant     Past Medical History:   Diagnosis Date    AMENA (acute kidney injury) 2020    CORI positive     Anemia     Anxiety     Ataxia     Atypical chest pain 2022    SEEN AT [facility] ER    Balance disorder     Cataract     BILATERAL, S/P EXTRACTION    Cervical radiculopathy 2019    SEEN AT  [facility] ER    Cervical spinal cord compression     Chronic diastolic (congestive) heart failure     Chronic kidney disease     STAGE 3, FOLLOWED BY DR. VIVAR  "   Chronic pancreatitis     Closed left subtrochanteric femur fracture 12/01/2020    ADMITTED TO [facility]    Closed nondisplaced intertrochanteric fracture of left femur 12/01/2020    ADMITTED TO [facility]    Colon polyps     FOLLOWED BY DR. AVTAR JEONG    Constipation     Contracture, right hand     Coronary artery disease     CABG 7/2019    COVID-19 07/2022    DDD (degenerative disc disease), cervical     Diabetes mellitus, type II     IDDM    Diabetic retinopathy     Dysphagia     Elevated brain natriuretic peptide (BNP) level 08/2014    Elevated LFTs 03/2021    Erectile dysfunction     Foot drop     Fuchs' corneal dystrophy of right eye     Glaucoma     BILATERAL    History of alcohol abuse     Hyperlipidemia     Hypersomnia     Hypertension     Hypertensive urgency 02/25/2020    ADMITTED TO [facility]    Insomnia     Kidney stones     LV dysfunction 06/2016    Lyme disease     Lymphadenopathy syndrome 05/2021    Macular edema     BILATERAL    Myocardial infarction 11/05/2019    NSTEMI, ADMITTED TO [facility]    Myocardial infarction 07/12/2019    NSTEMI, ADMITTED TO [facility]    Non-celiac gluten sensitivity     Orthostasis     Osteoporosis     Oxygen dependent     PAD (peripheral artery disease)     PNA (pneumonia) 06/2016    LEFT LOBE    Polyneuropathy     PTSD (post-traumatic stress disorder)     Pulmonary hypertension     Pulmonary nodule     Senile ectropion of both lower eyelids 02/2022    Sepsis 12/16/2020    D/T UTI, ADMITTED TO [facility]    Sleep apnea     STATES DOESN'T USE BIPAP OR CPAP    Spinal stenosis in cervical region     SEVERE-LIMITED ROM    Stroke 2012    \"slight stroke\"    Syncope and collapse 07/06/2019    ADMITTED TO [place]    Urinary retention 04/05/2021    SEEN AT [facility] ER    Vitamin D deficiency      Past Surgical History:   Procedure Laterality Date    CARDIAC CATHETERIZATION N/A 07/15/2019    Procedure: Coronary angiography;  Surgeon: Carrie Price MD;  Location:  [facility];  Service: Cardiovascular "    CARDIAC CATHETERIZATION N/A 07/15/2019    Procedure: Left Heart Cath;  Surgeon: Carrie Price MD;  Location:  [facility];  Service: Cardiovascular    CARDIAC CATHETERIZATION N/A 07/15/2019    Procedure: Left ventriculography;  Surgeon: Carrie Price MD;  Location:  [facility];  Service: Cardiovascular    CARDIAC CATHETERIZATION  07/15/2019    Procedure: Functional Flow [place];  Surgeon: Carrie Price MD;  Location:  [facility];  Service: Cardiovascular    CARDIAC CATHETERIZATION N/A 11/06/2019    Procedure: Right and Left Heart Cath;  Surgeon: Mya Smith MD;  Location:  [facility];  Service: Cardiovascular    CARDIAC CATHETERIZATION N/A 11/06/2019    Procedure: Coronary angiography;  Surgeon: Mya Smith MD;  [facility];  Service: Cardiovascular    CARDIAC SURGERY      CATARACT EXTRACTION Left 2014    CATARACT EXTRACTION Right 11/2016    PHACO/IOL, DR. LEN DENTON    COLONOSCOPY N/A 03/16/2023    ENTIRE COLON WNL, RESCOPE IN 5 YRS, DR. LINDA LIZARRAGA AT [facility]    COLONOSCOPY W/ POLYPECTOMY N/A 01/02/2015    A FEW DIVERTICULA IN SIGMOID, 6 MM TUBULOVILLOUS ADENOMA POLYP IN RECTUM, SMALL HEMORRHOIDS, MELANOSIS COLI, DR. AVTAR JEONG AT [facility]    CORONARY ARTERY BYPASS GRAFT N/A 07/18/2019    Procedure: INTRAOPERATIVE SHOAIB; STERNOTOMY CORONARY ARTERY BYPASS x 3  USING LEFT INTERNAL MAMMARY ARTERY GRAFT UTILIZING ENDOSCOPICALLY HARVESTED RIGHT GREATER SAPHENOUS VEIN AND PRP.;  Surgeon: Bill Devi MD;  Location: [facility] OR;  Service: Cardiothoracic    CYSTOSCOPY BLADDER STONE LITHOTRIPSY N/A     DENTAL PROCEDURE Bilateral     3 surgeries inder implants    FEMUR IM NAILING/RODDING Left 12/03/2020    Procedure: LEFT HIP INTRAMEDULLARY NAIL;  Surgeon: Niraj Ravi MD;  Location: [facility] MAIN OR;  Service: Orthopedic Spine;  Laterality: Left;    INCISION AND DRAINAGE PERIRECTAL  ABSCESS N/A 10/4/2023    Procedure: Incision and drainage of perianal and buttock abscess;  Surgeon: Herbie Villatoro MD;  Location: [facility];  Service: General;  Laterality: N/A;    INGUINAL HERNIA REPAIR Bilateral     TOENAIL EXCISION  06/2022    TONSILLECTOMY Bilateral     TOTAL HIP ARTHROPLASTY REVISION Left 01/11/2022    Procedure: TOTAL HIP ARTHROPLASTY REVISION- POSTERIOR;  Surgeon: Jurgen Candelaria II, MD;  Location: [facility];  Service: Orthopedics;  Laterality: Left;    VASECTOMY N/A       General Information       Row Name 10/05/23 1642          Physical Therapy Time and Intention    Document Type evaluation  -DB     Mode of Treatment individual therapy;physical therapy  -DB       Row Name 10/05/23 1642          General Information    Patient Profile Reviewed yes  -DB     Prior Level of Function independent:  prior to multiple health issues  -DB     Existing Precautions/Restrictions fall  -DB     Barriers to Rehab medically complex  -DB       Row Name 10/05/23 1642          Living Environment    People in Home spouse  -DB       Row Name 10/05/23 1642          Cognition    Orientation Status (Cognition) oriented x 4  -DB       Row Name 10/05/23 1642          Safety Issues, Functional Mobility    Impairments Affecting Function (Mobility) balance;pain;strength;endurance/activity tolerance  -DB               User Key  (r) = Recorded By, (t) = Taken By, (c) = Cosigned By      Initials Name Provider Type    DB Spring Méndez PT Physical Therapist                   Mobility       Row Name 10/05/23 1644          Bed Mobility    Bed Mobility supine-sit;sit-supine  -DB     Supine-Sit [place] (Bed Mobility) minimum assist (75% patient effort);moderate assist (50% patient effort);2 person assist;verbal cues;nonverbal cues (demo/gesture)  -DB     Sit-Supine [place] (Bed Mobility) minimum assist (75% patient effort);moderate assist (50% patient effort);2 person assist;verbal  cues;nonverbal cues (demo/gesture)  -DB     Assistive Device (Bed Mobility) bed rails;head of bed elevated  -DB       Row Name 10/05/23 1644          Sit-Stand Transfer    Sit-Stand [place] (Transfers) moderate assist (50% patient effort);2 person assist;verbal cues;nonverbal cues (demo/gesture)  -DB     Assistive Device (Sit-Stand Transfers) walker, front-wheeled  -DB       Row Name 10/05/23 1644          Gait/Stairs (Locomotion)    [place] Level (Gait) minimum assist (75% patient effort);2 person assist;verbal cues;nonverbal cues (demo/gesture)  -DB     Assistive Device (Gait) walker, front-wheeled  -DB     Distance in Feet (Gait) 5  side steps at EOB  -DB     Deviations/Abnormal Patterns (Gait) gait speed decreased;stride length decreased  -DB     Bilateral Gait Deviations forward flexed posture;heel strike decreased  -DB     Right Sided Gait Deviations weight shift ability decreased  -DB               User Key  (r) = Recorded By, (t) = Taken By, (c) = Cosigned By      Initials Name Provider Type    DB Spring Méndez PT Physical Therapist                   Obj/Interventions       Row Name 10/05/23 1653          Range of Motion Comprehensive    General Range of Motion no range of motion deficits identified  -DB       Row Name 10/05/23 1653          Strength Comprehensive (MMT)    Comment, General Manual Muscle Testing (MMT) Assessment generalized weakness  -DB       Row Name 10/05/23 1653          Balance    Balance Assessment sitting static balance;sitting dynamic balance;standing static balance;standing dynamic balance  -DB     Static Sitting Balance standby assist  -DB     Dynamic Sitting Balance standby assist  -DB     Position, Sitting Balance unsupported;sitting edge of bed  -DB     Static Standing Balance minimal assist  -DB     Dynamic Standing Balance minimal assist  -DB     Position/Device Used, Standing Balance supported;walker, front-wheeled  -DB     Balance Interventions  sitting;standing;sit to stand  -DB               User Key  (r) = Recorded By, (t) = Taken By, (c) = Cosigned By      Initials Name Provider Type    DB Spring Méndez, PT Physical Therapist                   Goals/Plan       Row Name 10/05/23 1701          Bed Mobility Goal 1 (PT)    Activity/Assistive Device (Bed Mobility Goal 1, PT) bed mobility activities, all  -DB     [place] Level/Cues Needed (Bed Mobility Goal 1, PT) contact guard required  -DB     Time Frame (Bed Mobility Goal 1, PT) 2 weeks  -DB       Row Name 10/05/23 1701          Transfer Goal 1 (PT)    Activity/Assistive Device (Transfer Goal 1, PT) transfers, all  -DB     [place] Level/Cues Needed (Transfer Goal 1, PT) contact guard required  -DB     Time Frame (Transfer Goal 1, PT) 2 weeks  -DB       Row Name 10/05/23 1701          Gait Training Goal 1 (PT)    Activity/Assistive Device (Gait Training Goal 1, PT) gait (walking locomotion)  -DB     [place] Level (Gait Training Goal 1, PT) contact guard required  -DB     Time Frame (Gait Training Goal 1, PT) 2 weeks  -DB       Row Name 10/05/23 1701          Therapy Assessment/Plan (PT)    Planned Therapy Interventions (PT) balance training;bed mobility training;gait training;home exercise program;neuromuscular re-education;strengthening;patient/family education;transfer training;postural re-education  -DB               User Key  (r) = Recorded By, (t) = Taken By, (c) = Cosigned By      Initials Name Provider Type    DB Spring Méndez, PT Physical Therapist                   Clinical Impression       Row Name 10/05/23 1700          Pain    Pain Intervention(s) Ambulation/increased activity;Repositioned  -DB       Row Name 10/05/23 1700          Plan of Care Review    Plan of Care Reviewed With patient  -DB     Outcome Evaluation Patient is a 73 y.o. male admitted to [facility] for urinary retention and sacral wound on 10/3/2023. PMHx includes DM2, HTN, CHF, CVA, sleep apnea, ad cervical and  lumbar stenosis. Patient is (I) at baseline and lives with his wife. He states prior to getting sick recently, he was normally able to ambulate well. Today, patient performed bed mobility with min/modA x2, required modA x2 for transfers, and ambulated Tank x2 5' side steps to HOB. Pt significantly limited 2/2 pain in buttock area d/t sacral wound. He has difficulty performing transitional movements and would likely not tolerate sitting UIC. Strength, balance, and activity tolerance deficits noted. Patient may benefit from skilled PT services acutely to address functional deficits as well as improve level of independence prior to discharge. Anticipate SNF upon DC.  -DB       Row Name 10/05/23 1700          Therapy Assessment/Plan (PT)    Rehab Potential (PT) good, to achieve stated therapy goals  -DB     Criteria for Skilled Interventions Met (PT) yes  -DB     Therapy Frequency (PT) 5 times/wk  -DB       Row Name 10/05/23 1700          Vital Signs    O2 Delivery Pre Treatment supplemental O2  -DB     O2 Delivery Intra Treatment supplemental O2  -DB     O2 Delivery Post Treatment supplemental O2  -DB     Pre Patient Position Supine  -DB     Intra Patient Position Standing  -DB     Post Patient Position Side Lying  -DB       Row Name 10/05/23 1700          Positioning and Restraints    Pre-Treatment Position in bed  -DB     Post Treatment Position bed  -DB     In Bed side lying left;call light within reach;encouraged to call for assist;exit alarm on;notified nsg  -DB               User Key  (r) = Recorded By, (t) = Taken By, (c) = Cosigned By      Initials Name Provider Type    Spring [facility], PT Physical Therapist                   Outcome Measures       Row Name 10/05/23 1702 10/05/23 0805       How much help from another person do you currently need...    Turning from your back to your side while in flat bed without using bedrails? 2  -DB 2  -EH    Moving from lying on back to sitting on the side of a flat bed  without bedrails? 2  -DB 2  -EH    Moving to and from a bed to a chair (including a wheelchair)? 2  -DB 2  -EH    Standing up from a chair using your arms (e.g., wheelchair, bedside chair)? 2  -DB 2  -EH    Climbing 3-5 steps with a railing? 2  -DB 2  -EH    To walk in hospital room? 2  -DB 2  -EH    AM-PAC 6 Clicks Score (PT) 12  -DB 12  -EH    Highest level of mobility 4 --> Transferred to chair/commode  -DB 4 --> Transferred to chair/commode  -EH      Row Name 10/05/23 1702          Functional Assessment    Outcome Measure Options AM-PAC 6 Clicks Basic Mobility (PT)  -DB               User Key  (r) = Recorded By, (t) = Taken By, (c) = Cosigned By      Initials Name Provider Type    DB Spring Méndez, SHEFALI Physical Therapist    Lilian Rucker, RN Registered Nurse                                 Physical Therapy Education       Title: PT OT SLP Therapies (Done)       Topic: Physical Therapy (Done)       Point: Mobility training (Done)       Learning Progress Summary             Patient Acceptance, E, VU,NR by DB at 10/5/2023 1702                         Point: Home exercise program (Done)       Learning Progress Summary             Patient Acceptance, E, VU,NR by DB at 10/5/2023 1702                         Point: Body mechanics (Done)       Learning Progress Summary             Patient Acceptance, E, VU,NR by DB at 10/5/2023 1702                         Point: Precautions (Done)       Learning Progress Summary             Patient Acceptance, E, VU,NR by DB at 10/5/2023 1702                                         User Key       Initials Effective Dates Name Provider Type Discipline    DB 06/16/21 -  Spring Méndez, SHEFALI Physical Therapist PT                  PT Recommendation and Plan  Planned Therapy Interventions (PT): balance training, bed mobility training, gait training, home exercise program, neuromuscular re-education, strengthening, patient/family education, transfer training, postural  re-education  Plan of Care Reviewed With: patient  Outcome Evaluation: Patient is a 73 y.o. male admitted to [facility] for urinary retention and sacral wound on 10/3/2023. PMHx includes DM2, HTN, CHF, CVA, sleep apnea, ad cervical and lumbar stenosis. Patient is (I) at baseline and lives with his wife. He states prior to getting sick recently, he was normally able to ambulate well. Today, patient performed bed mobility with min/modA x2, required modA x2 for transfers, and ambulated Tank x2 5' side steps to HOB. Pt significantly limited 2/2 pain in buttock area d/t sacral wound. He has difficulty performing transitional movements and would likely not tolerate sitting UIC. Strength, balance, and activity tolerance deficits noted. Patient may benefit from skilled PT services acutely to address functional deficits as well as improve level of independence prior to discharge. Anticipate SNF upon DC.     Time Calculation:         PT Charges       Row Name 10/05/23 1709             Time Calculation    Start Time 1430  -DB      Stop Time 1450  -DB      Time Calculation (min) 20 min  -DB      PT Received On 10/05/23  -DB      PT - Next Appointment 10/06/23  -DB      PT Goal Re-Cert Due Date 10/19/23  -DB         Time Calculation- PT    Total Timed Code Minutes- PT 0 minute(s)  -DB                User Key  (r) = Recorded By, (t) = Taken By, (c) = Cosigned By      Initials Name Provider Type    DB Spring Méndez, SHEFALI Physical Therapist                  Therapy Charges for Today       Code Description Service Date Service Provider Modifiers Qty    79271209048 HC PT EVAL MOD COMPLEXITY 4 10/5/2023 Spring Méndez, PT GP 1            PT G-Codes  Outcome Measure Options: AM-PAC 6 Clicks Basic Mobility (PT)  AM-PAC 6 Clicks Score (PT): 12  PT Discharge Summary  Anticipated Discharge Disposition (PT): skilled nursing facility, inpatient rehabilitation facility    Spring Méndez PT  10/5/2023

## 2023-10-05 NOTE — PROGRESS NOTES
Name: Dilip Verma ADMIT: 10/3/2023   : 1950  PCP: Tena Taylor MD    MRN: [id number] LOS: 0 days   AGE/SEX: 73 y.o. male  ROOM: [facility]     Subjective   Subjective   Feeling better this AM.  No N/V/D/F/C/NS/SOA/CP/palp/HA/vis changes/abd pain/back pain.  Tolerating po. Making urine.       Objective   Objective   Vital Signs  Temp:  [98 °F (36.7 °C)-98.7 °F (37.1 °C)] 98.1 °F (36.7 °C)  Heart Rate:  [70-98] 73  Resp:  [16] 16  BP: (113-175)/() 142/65  SpO2:  [93 %-98 %] 96 %  on  Flow (L/min):  [2-3] 2;   Device (Oxygen Therapy): nasal cannula  Body mass index is 27.4 kg/m².    (No change in exam today)    Physical Exam  Vitals and nursing note reviewed. Exam conducted with a chaperone present (Wife).   Constitutional:       General: He is not in acute distress.     Appearance: He is ill-appearing (chronically). He is not toxic-appearing or diaphoretic.   HENT:      Head: Normocephalic.      Nose: Nose normal.      Mouth/Throat:      Mouth: Mucous membranes are moist.      Pharynx: Oropharynx is clear.   Eyes:      General: No scleral icterus.        Right eye: No discharge.         Left eye: No discharge.      Extraocular Movements: Extraocular movements intact.      Conjunctiva/sclera: Conjunctivae normal.   Cardiovascular:      Rate and Rhythm: Normal rate and regular rhythm.      Pulses: Normal pulses.   Pulmonary:      Effort: Pulmonary effort is normal. No respiratory distress.      Breath sounds: Normal breath sounds. No wheezing or rales.   Abdominal:      General: Bowel sounds are normal. There is no distension.      Palpations: Abdomen is soft.      Tenderness: There is no abdominal tenderness.   Genitourinary:     Comments: Clear yellow urine in light bag  Musculoskeletal:         General: Deformity (contractures of BLEs and RUE) present. No swelling.      Cervical back: Neck supple. No rigidity.   Skin:     General: Skin is warm and dry.      Capillary Refill: Capillary refill  takes less than 2 seconds.      Coloration: Skin is pale. Skin is not jaundiced.   Neurological:      Mental Status: He is alert and oriented to person, place, and time. Mental status is at baseline.      Cranial Nerves: No cranial nerve deficit.      Comments: Bilateral foot drop   Psychiatric:         Mood and Affect: Mood normal.     Results Review     I reviewed the patient's new clinical results.  Results from last 7 days   Lab Units 10/05/23  0648 10/04/23  0641 10/03/23  1245   WBC 10*3/mm3 10.42 14.07* 12.39*   HEMOGLOBIN g/dL 13.6 15.3 16.2   PLATELETS 10*3/mm3 259 300 271       Results from last 7 days   Lab Units 10/05/23  0648 10/04/23  0641 10/03/23  1245   SODIUM mmol/L 141 139 135*   POTASSIUM mmol/L 3.6 3.9 4.3   CHLORIDE mmol/L 101 98 92*   CO2 mmol/L 33.8* 30.3* 28.5   BUN mg/dL 15 14 12   CREATININE mg/dL 1.25 0.92 0.98   GLUCOSE mg/dL 201* 233* 263*   EGFR mL/min/1.73 60.4 87.3 80.9       Results from last 7 days   Lab Units 10/05/23  0648 10/04/23  0641 10/03/23  1245   ALBUMIN g/dL 2.8* 3.0* 3.2*   BILIRUBIN mg/dL 0.5 0.5 0.6   ALK PHOS U/L 53 69 73   AST (SGOT) U/L 20 15 11   ALT (SGPT) U/L 22 20 23       Results from last 7 days   Lab Units 10/05/23  0648 10/04/23  0641 10/03/23  1814 10/03/23  1245   CALCIUM mg/dL 8.5* 9.0  --  9.3   ALBUMIN g/dL 2.8* 3.0*  --  3.2*   MAGNESIUM mg/dL 1.9  --  1.9  --        Results from last 7 days   Lab Units 10/03/23  1814   PROCALCITONIN ng/mL 0.09   LACTATE mmol/L 1.6       Hemoglobin A1C   Date/Time Value Ref Range Status   10/03/2023 1245 8.90 (H) 4.80 - 5.60 % Final     Glucose   Date/Time Value Ref Range Status   10/05/2023 0808 184 (H) 70 - 130 mg/dL Final   10/04/2023 2047 194 (H) 70 - 130 mg/dL Final   10/04/2023 1813 198 (H) 70 - 130 mg/dL Final   10/04/2023 1640 200 (H) 70 - 130 mg/dL Final   10/04/2023 1308 232 (H) 70 - 130 mg/dL Final   10/04/2023 0941 256 (H) 70 - 130 mg/dL Final   10/04/2023 0819 209 (H) 70 - 130 mg/dL Final       No  radiology results for the last day    I have personally reviewed all medications:  Scheduled Medications  aspirin, 81 mg, Oral, Daily  brimonidine, 1 drop, Both Eyes, BID  bumetanide, 1 mg, Intravenous, Once  bumetanide, 0.5 mg, Oral, BID  cholecalciferol, 5,000 Units, Oral, Q24H  docusate sodium, 100 mg, Oral, Daily  insulin glargine, 1-200 Units, Subcutaneous, Nightly - Glucommander  insulin lispro, 1-200 Units, Subcutaneous, 4x Daily With Meals & Nightly  latanoprost, 1 drop, Both Eyes, Nightly  magnesium (as) gluconate, 27 mg, Oral, Daily  piperacillin-tazobactam, 3.375 g, Intravenous, Q8H  potassium chloride ER, 40 mEq, Oral, Q4H  saccharomyces boulardii, 250 mg, Oral, BID  senna-docusate sodium, 2 tablet, Oral, BID  sodium chloride, 10 mL, Intravenous, Q12H  tamsulosin, 0.4 mg, Oral, Daily  testosterone, 25 mg, Transdermal, Daily    Infusions  lactated ringers, 9 mL/hr, Last Rate: 9 mL/hr (10/05/23 0542)    Diet  Diet: Renal Diets; Low Potassium, Low Phosphorus; Texture: Regular Texture (IDDSI 7); Fluid Consistency: Thin (IDDSI 0)    I have personally reviewed:  [x]  Laboratory   [x]  Microbiology   []  Radiology   []  EKG/Telemetry  []  Cardiology/Vascular   []  Pathology    []  Records       Assessment/Plan     Active Hospital Problems    Diagnosis  POA    **Gluteal abscess [L02.31]  Yes    Cervical stenosis of spine [M48.02]  Yes    Spinal stenosis, lumbar region, without neurogenic claudication [M48.061]  Yes    Cellulitis [L03.90]  Yes    Medically noncompliant [Z91.199]  Not Applicable    Type 2 diabetes mellitus with diabetic neuropathy, with long-term current use of insulin [E11.40, Z79.4]  Not Applicable    Urinary retention [R33.9]  Yes    CKD (chronic kidney disease) stage 3, GFR 30-59 ml/min [N18.30]  Yes    History of stroke [Z86.73]  Not Applicable    HTN (hypertension) [I10]  Yes    CSA (central sleep apnea) [G47.31]  Yes    Chronic diastolic heart failure [I50.32]  Yes    Hx of CABG [Z95.1]   Not Applicable    CAD (coronary artery disease) [I25.10]  Yes      Resolved Hospital Problems   No resolved problems to display.       74yo gentleman with chronic cervical and lumbar spinal stenosis, bilateral foot drop, right hand contracture, peripheral neuropathy, as well as diastolic CHF, CAD/CABG, sleep apnea, HTN, CKD3, HTN, prior CVA, DM2, and HLD, who presented to ER with a couple days of urinary retention and 2 weeks of a gluteal cleft abscess that failed outpt oral abx therapy.     Complex right gluteal abscess: s/p I&D 10/4 by Dr. Villatoro, continue IV Zosyn for now, will need 5d total abx per Surg, can change to oral at dc, operative cultures unremarkable, PCT and LA wnl, afebrile, WBC has normalized this AM  Continue wet to dry packing until heals  F/u with Dr. Villatoro in 2 weeks  Will probably need HH at dc to assist with wound care at home    Urinary retention: has h/o urinary retention in past, followed by Dr. Velasco but lost to f/u for past 18mo  ?Due to BPH vs Neurogenic bladder  Appreciate  attention to pt  Will need outpt urodynamics eventually  Await BRISSA eval/recs today, ?MRI spine  For now continue light    Severe, chronic lumbar and cervical spinal stenosis  Bilateral foot drop  Right hand contracture  Peripheral neuropathy: stable at baseline, tramadol continued, requiring some additional analgesia for postop pain    DM2: A1c 8.9, continue basal/bolus insulin per Glucommander, sugars improving    Chronic diastolic CHF: BNP quite high on admission, s/p dose of IV Bumex, appears euvolemic at present, no pulm edema on CXR, continue oral Bumex, monitor UOP and renal function    CKD3a: Cr wnl here, monitor    HTN: BPs better today, not on meds PTA, monitor    Prior CVA: continue ASA, not on statin for some reason    Central sleep apnea: continue supplemental O2 when sleeping, pt has not seen his sleep MD in 3 years, did not tolerate BiPAP, uses nasal cannula O2 at home whenever he  sleeps    CAD/CABG: no s/sx of ACS, continue ASA, not on statin, JOHN med, or BB    Prior Alcohol Abuse: not currently drinking alcohol      SCDs for DVT prophylaxis.  Full code.  Discussed with patient and wife.  Anticipate discharge  TBD , PT eval pending.      Amador Valverde MD  [facility]  10/05/23  09:28 EDT

## 2023-10-05 NOTE — PROGRESS NOTES
"  [facility] Progress Note    Chief Complaint: No new complaints    Hopkisn catheter is draining well.  Urine is clear.    Review of Systems:    The following systems were reviewed and negative;  respiratory, cardiovascular, and gastrointestinal          Vital Signs  /74 (BP Location: Right arm, Patient Position: Lying)   Pulse 70   Temp 97.5 °F (36.4 °C) (Oral)   Resp 16   Ht 175.3 cm (69.02\")   Wt 84.2 kg (185 lb 10 oz)   SpO2 97%   BMI 27.40 kg/m²     Physical Exam:     General Appearance:    Alert, cooperative, NAD   HEENT:    No trauma, pupils reactive, hearing intact   Back:     No CVA tenderness   Lungs:     Respirations unlabored, no wheezing    Heart:    RRR, intact peripheral pulses   Abdomen:   Soft benign no bladder distention   :  Hopkins catheter anchored.  Penis or testes normal   Extremities:   No edema, no deformity   Lymphatic:   No neck or groin LAD   Skin:   No bleeding, bruising or rashes   Neuro/Psych:   Orientation intact, mood/affect pleasant, no focal findings        Results Review:     I reviewed the patient's new clinical results.  Lab Results (last 24 hours)       Procedure Component Value Units Date/Time    POC Glucose Once [[id number]]  (Abnormal) Collected: 10/05/23 1735    Specimen: Blood Updated: 10/05/23 1736     Glucose 218 mg/dL     Potassium [[id number]]  (Normal) Collected: 10/05/23 1634    Specimen: Blood Updated: 10/05/23 1654     Potassium 4.2 mmol/L     POC Glucose Once [[id number]]  (Abnormal) Collected: 10/05/23 1220    Specimen: Blood Updated: 10/05/23 1222     Glucose 249 mg/dL     Wound Culture - Wound, Perirectal Abscess [[id number]] Collected: 10/04/23 1233    Specimen: Wound from Perirectal Abscess Updated: 10/05/23 0839     Wound Culture No growth     Gram Stain Few (2+) WBCs seen      Many (4+) Gram positive bacilli      Few (2+) Gram negative bacilli      Few (2+) Gram positive cocci    POC Glucose Once [[id number]]  (Abnormal) Collected: 10/05/23 0808    " Specimen: Blood Updated: 10/05/23 0809     Glucose 184 mg/dL     Magnesium [[id number]]  (Normal) Collected: 10/05/23 0648    Specimen: Blood Updated: 10/05/23 0734     Magnesium 1.9 mg/dL     Comprehensive Metabolic Panel [[id number]]  (Abnormal) Collected: 10/05/23 0648    Specimen: Blood Updated: 10/05/23 0734     Glucose 201 mg/dL      BUN 15 mg/dL      Creatinine 1.25 mg/dL      Sodium 141 mmol/L      Potassium 3.6 mmol/L      Chloride 101 mmol/L      CO2 33.8 mmol/L      Calcium 8.5 mg/dL      Total Protein 5.9 g/dL      Albumin 2.8 g/dL      ALT (SGPT) 22 U/L      AST (SGOT) 20 U/L      Alkaline Phosphatase 53 U/L      Total Bilirubin 0.5 mg/dL      Globulin 3.1 gm/dL      A/G Ratio 0.9 g/dL      BUN/Creatinine Ratio 12.0     Anion Gap 6.2 mmol/L      eGFR 60.4 mL/min/1.73     Narrative:      GFR Normal >60  Chronic Kidney Disease <60  Kidney Failure <15    The GFR formula is only valid for adults with stable renal function between ages 18 and 70.    CBC & Differential [[id number]]  (Abnormal) Collected: 10/05/23 0648    Specimen: Blood Updated: 10/05/23 0716    Narrative:      The following orders were created for panel order CBC & Differential.  Procedure                               Abnormality         Status                     ---------                               -----------         ------                     CBC Auto Differential[[id number]]        Abnormal            Final result                 Please view results for these tests on the individual orders.    CBC Auto Differential [[id number]]  (Abnormal) Collected: 10/05/23 0648    Specimen: Blood Updated: 10/05/23 0716     WBC 10.42 10*3/mm3      RBC 4.24 10*6/mm3      Hemoglobin 13.6 g/dL      Hematocrit 39.9 %      MCV 94.1 fL      MCH 32.1 pg      MCHC 34.1 g/dL      RDW 12.4 %      RDW-SD 43.1 fl      MPV 10.5 fL      Platelets 259 10*3/mm3      Neutrophil % 71.6 %      Lymphocyte % 16.0 %      Monocyte % 9.2 %      Eosinophil % 1.8 %       Basophil % 0.6 %      Immature Grans % 0.8 %      Neutrophils, Absolute 7.46 10*3/mm3      Lymphocytes, Absolute 1.67 10*3/mm3      Monocytes, Absolute 0.96 10*3/mm3      Eosinophils, Absolute 0.19 10*3/mm3      Basophils, Absolute 0.06 10*3/mm3      Immature Grans, Absolute 0.08 10*3/mm3      nRBC 0.0 /100 WBC     POC Glucose Once [[id number]]  (Abnormal) Collected: 10/04/23 2047    Specimen: Blood Updated: 10/04/23 2049     Glucose 194 mg/dL           Imaging Results (Last 24 Hours)       ** No results found for the last 24 hours. **            Medication Review:   I have personally reviewed    Current Facility-Administered Medications:     acetaminophen (TYLENOL) tablet 650 mg, 650 mg, Oral, Q4H PRN **OR** acetaminophen (TYLENOL) 160 MG/5ML oral solution 650 mg, 650 mg, Oral, Q4H PRN **OR** acetaminophen (TYLENOL) suppository 650 mg, 650 mg, Rectal, Q4H PRN, Herbie Villatoro MD    aspirin EC tablet 81 mg, 81 mg, Oral, Daily, Herbie Villatoro MD, 81 mg at 10/05/23 0840    sennosides-docusate (PERICOLACE) 8.6-50 MG per tablet 2 tablet, 2 tablet, Oral, BID, 2 tablet at 10/05/23 0843 **AND** polyethylene glycol (MIRALAX) packet 17 g, 17 g, Oral, Daily PRN **AND** bisacodyl (DULCOLAX) EC tablet 5 mg, 5 mg, Oral, Daily PRN **AND** bisacodyl (DULCOLAX) suppository 10 mg, 10 mg, Rectal, Daily PRN, Herbie Villatoro MD    brimonidine (ALPHAGAN) 0.2 % ophthalmic solution 1 drop, 1 drop, Both Eyes, BID, Herbie Villatoro MD, 1 drop at 10/04/23 2016    bumetanide (BUMEX) injection 1 mg, 1 mg, Intravenous, Once, Herbie Villatoro MD    bumetanide (BUMEX) tablet 0.5 mg, 0.5 mg, Oral, BID, Herbie Villatoro MD, 0.5 mg at 10/05/23 1745    calcium carbonate (TUMS) chewable tablet 500 mg (200 mg elemental), 2 tablet, Oral, BID PRN, Herbie Villatoro MD    Calcium Replacement - Follow Nurse / BPA Driven Protocol, , Does not apply, PRIrving GAIVN Samuel Clinton, MD    cholecalciferol  (VITAMIN D3) tablet 5,000 Units, 5,000 Units, Oral, Q24H, Herbie Villatoro MD, 5,000 Units at 10/04/23 1821    dextrose (D50W) (25 g/50 mL) IV injection 10-50 mL, 10-50 mL, Intravenous, Q15 Min PRN, Herbie Villatoro MD    dextrose (GLUTOSE) oral gel 15 g, 15 g, Oral, Q15 Min PRN, Herbie Villatoro MD    docusate sodium (COLACE) capsule 100 mg, 100 mg, Oral, Daily, Herbie Villatoro MD, 100 mg at 10/03/23 1901    Glucagon (GLUCAGEN) injection 1 mg, 1 mg, Intramuscular, Q15 Min PRN, Herbie Villatoro MD    HYDROcodone-acetaminophen (NORCO) 7.5-325 MG per tablet 1 tablet, 1 tablet, Oral, Q6H PRN, Amador Valverde MD, 1 tablet at 10/05/23 0840    insulin glargine (LANTUS, SEMGLEE) injection 1-200 Units, 1-200 Units, Subcutaneous, Nightly - Glucommander, Herbie Villatoro MD, 24 Units at 10/04/23 2254    insulin lispro (HUMALOG/ADMELOG) injection 1-200 Units, 1-200 Units, Subcutaneous, 4x Daily With Meals & Nightly, Herbie Villatoro MD, 3 Units at 10/05/23 1751    insulin lispro (HUMALOG/ADMELOG) injection 1-200 Units, 1-200 Units, Subcutaneous, PRN, Herbie Villatoro MD, 2 Units at 10/03/23 2230    lactated ringers infusion, 9 mL/hr, Intravenous, Continuous, Herbie Villatoro MD, Last Rate: 9 mL/hr at 10/05/23 0542, 9 mL/hr at 10/05/23 0542    latanoprost (XALATAN) 0.005 % ophthalmic solution 1 drop, 1 drop, Both Eyes, Nightly, Herbie Villatoro MD, 1 drop at 10/04/23 2256    magnesium (as) gluconate (MAGONATE) tablet 27 mg, 27 mg, Oral, Daily, Herbie Villatoro MD, 27 mg at 10/05/23 0843    Magnesium Standard Dose Replacement - Follow Nurse / BPA Driven Protocol, , Does not apply, PRN, Herbie Villatoro MD    melatonin tablet 3 mg, 3 mg, Oral, Nightly PRN, Herbie Villatoro MD    morphine injection 1 mg, 1 mg, Intravenous, Q4H PRN, Amador Valverde MD, 1 mg at 10/05/23 1500    ondansetron (ZOFRAN) tablet 4 mg, 4 mg, Oral, Q6H  PRN **OR** ondansetron (ZOFRAN) injection 4 mg, 4 mg, Intravenous, Q6H PRN, Herbie Villatoro MD    Phosphorus Replacement - Follow Nurse / BPA Driven Protocol, , Does not apply, PRN, Herbie Villatoro MD    piperacillin-tazobactam (ZOSYN) 3.375 g in iso-osmotic dextrose 50 ml (premix), 3.375 g, Intravenous, Q8H, Herbie Villatoro MD, Last Rate: 0 mL/hr at 10/05/23 0013, 3.375 g at 10/05/23 1230    Potassium Replacement - Follow Nurse / BPA Driven Protocol, , Does not apply, PRN, Herbie Villatoro MD    saccharomyces boulardii (FLORASTOR) capsule 250 mg, 250 mg, Oral, BID, Herbie Villatoro MD, 250 mg at 10/05/23 0842    sodium chloride 0.9 % flush 10 mL, 10 mL, Intravenous, Q12H, Herbie Villatoro MD, 10 mL at 10/05/23 0842    sodium chloride 0.9 % flush 10 mL, 10 mL, Intravenous, PRN, Herbie Villatoro MD    sodium chloride 0.9 % infusion 40 mL, 40 mL, Intravenous, PRN, Herbie Villatoro MD    tamsulosin (FLOMAX) 24 hr capsule 0.4 mg, 0.4 mg, Oral, Daily, Sukh Jaime MD, 0.4 mg at 10/05/23 0842    testosterone (ANDROGEL) gel 25 mg, 25 mg, Transdermal, Daily, Herbie Villatoro MD    traMADol (ULTRAM) tablet 50 mg, 50 mg, Oral, Nightly PRN, Herbie Villatoro MD    Allergies:    Ace inhibitors, Losartan, Seroquel [quetiapine], Xanax [alprazolam], Amlodipine, Ativan [lorazepam], Baclofen, Minoxidil, and Tetracycline    Assessment:    Active Problems:    Gluteal abscess    CAD (coronary artery disease)    Hx of CABG    Chronic diastolic heart failure    CSA (central sleep apnea)    HTN (hypertension)    History of stroke    CKD (chronic kidney disease) stage 3, GFR 30-59 ml/min    Urinary retention    Type 2 diabetes mellitus with diabetic neuropathy, with long-term current use of insulin    Cervical stenosis of spine    Spinal stenosis, lumbar region, without neurogenic claudication    Cellulitis    Medically noncompliant       Urinary  retention history of prior prolonged retention with interim need for intermittent catheterization    Plan:    Voiding trial tomorrow.  He can be discharged at any point and if he fails his voiding trial then home with a catheter and we will arrange for urodynamic studies in the office setting.      Sukh Jaime MD    10/5/2023  19:09 EDT

## 2023-10-05 NOTE — PLAN OF CARE
Goal Outcome Evaluation:           Progress: improving  Outcome Evaluation: Patient alert and oriented, on 2L nasal cannula to maintain sats >90, incisional site packed and dressing changed per order, tolerating renal diet, out of bed with maximum of 2 for assistance patient has right sided deficit from prior stroke working with physical therapy, blood sugars monitored and treated per order, light to bedside drainage, vitals stable, plan of care continued

## 2023-10-06 ENCOUNTER — TELEPHONE (OUTPATIENT)
Dept: NEUROSURGERY | Facility: CLINIC | Age: 73
End: 2023-10-06
Payer: MEDICARE

## 2023-10-06 LAB
ALBUMIN SERPL-MCNC: 3 G/DL (ref 3.5–5.2)
ALBUMIN/GLOB SERPL: 0.9 G/DL
ALP SERPL-CCNC: 56 U/L (ref 39–117)
ALT SERPL W P-5'-P-CCNC: 28 U/L (ref 1–41)
ANION GAP SERPL CALCULATED.3IONS-SCNC: 4.2 MMOL/L (ref 5–15)
AST SERPL-CCNC: 20 U/L (ref 1–40)
BASOPHILS # BLD AUTO: 0.05 10*3/MM3 (ref 0–0.2)
BASOPHILS NFR BLD AUTO: 0.7 % (ref 0–1.5)
BILIRUB SERPL-MCNC: 0.4 MG/DL (ref 0–1.2)
BUN SERPL-MCNC: 15 MG/DL (ref 8–23)
BUN/CREAT SERPL: 14.4 (ref 7–25)
CALCIUM SPEC-SCNC: 8.7 MG/DL (ref 8.6–10.5)
CHLORIDE SERPL-SCNC: 102 MMOL/L (ref 98–107)
CO2 SERPL-SCNC: 31.8 MMOL/L (ref 22–29)
CREAT SERPL-MCNC: 1.04 MG/DL (ref 0.76–1.27)
DEPRECATED RDW RBC AUTO: 42 FL (ref 37–54)
EGFRCR SERPLBLD CKD-EPI 2021: 75.3 ML/MIN/1.73
EOSINOPHIL # BLD AUTO: 0.23 10*3/MM3 (ref 0–0.4)
EOSINOPHIL NFR BLD AUTO: 3 % (ref 0.3–6.2)
ERYTHROCYTE [DISTWIDTH] IN BLOOD BY AUTOMATED COUNT: 12.4 % (ref 12.3–15.4)
GLOBULIN UR ELPH-MCNC: 3.3 GM/DL
GLUCOSE BLDC GLUCOMTR-MCNC: 132 MG/DL (ref 70–130)
GLUCOSE BLDC GLUCOMTR-MCNC: 133 MG/DL (ref 70–130)
GLUCOSE BLDC GLUCOMTR-MCNC: 212 MG/DL (ref 70–130)
GLUCOSE BLDC GLUCOMTR-MCNC: 285 MG/DL (ref 70–130)
GLUCOSE SERPL-MCNC: 216 MG/DL (ref 65–99)
HCT VFR BLD AUTO: 43.2 % (ref 37.5–51)
HGB BLD-MCNC: 14.7 G/DL (ref 13–17.7)
IMM GRANULOCYTES # BLD AUTO: 0.09 10*3/MM3 (ref 0–0.05)
IMM GRANULOCYTES NFR BLD AUTO: 1.2 % (ref 0–0.5)
LYMPHOCYTES # BLD AUTO: 1.51 10*3/MM3 (ref 0.7–3.1)
LYMPHOCYTES NFR BLD AUTO: 19.9 % (ref 19.6–45.3)
MAGNESIUM SERPL-MCNC: 1.9 MG/DL (ref 1.6–2.4)
MCH RBC QN AUTO: 31.5 PG (ref 26.6–33)
MCHC RBC AUTO-ENTMCNC: 34 G/DL (ref 31.5–35.7)
MCV RBC AUTO: 92.7 FL (ref 79–97)
MONOCYTES # BLD AUTO: 0.48 10*3/MM3 (ref 0.1–0.9)
MONOCYTES NFR BLD AUTO: 6.3 % (ref 5–12)
NEUTROPHILS NFR BLD AUTO: 5.21 10*3/MM3 (ref 1.7–7)
NEUTROPHILS NFR BLD AUTO: 68.9 % (ref 42.7–76)
NRBC BLD AUTO-RTO: 0 /100 WBC (ref 0–0.2)
PLATELET # BLD AUTO: 249 10*3/MM3 (ref 140–450)
PMV BLD AUTO: 10.4 FL (ref 6–12)
POTASSIUM SERPL-SCNC: 4.4 MMOL/L (ref 3.5–5.2)
PROT SERPL-MCNC: 6.3 G/DL (ref 6–8.5)
RBC # BLD AUTO: 4.66 10*6/MM3 (ref 4.14–5.8)
SODIUM SERPL-SCNC: 138 MMOL/L (ref 136–145)
WBC NRBC COR # BLD: 7.57 10*3/MM3 (ref 3.4–10.8)

## 2023-10-06 PROCEDURE — 25010000002 MORPHINE PER 10 MG: Performed by: HOSPITALIST

## 2023-10-06 PROCEDURE — 80053 COMPREHEN METABOLIC PANEL: CPT | Performed by: SURGERY

## 2023-10-06 PROCEDURE — 97535 SELF CARE MNGMENT TRAINING: CPT

## 2023-10-06 PROCEDURE — 83735 ASSAY OF MAGNESIUM: CPT | Performed by: HOSPITALIST

## 2023-10-06 PROCEDURE — 63710000001 INSULIN LISPRO (HUMAN) PER 5 UNITS: Performed by: SURGERY

## 2023-10-06 PROCEDURE — 82948 REAGENT STRIP/BLOOD GLUCOSE: CPT

## 2023-10-06 PROCEDURE — 97166 OT EVAL MOD COMPLEX 45 MIN: CPT

## 2023-10-06 PROCEDURE — G0378 HOSPITAL OBSERVATION PER HR: HCPCS

## 2023-10-06 PROCEDURE — 99213 OFFICE O/P EST LOW 20 MIN: CPT

## 2023-10-06 PROCEDURE — 25010000002 PIPERACILLIN SOD-TAZOBACTAM PER 1 G: Performed by: SURGERY

## 2023-10-06 PROCEDURE — 85025 COMPLETE CBC W/AUTO DIFF WBC: CPT | Performed by: SURGERY

## 2023-10-06 RX ORDER — AMOXICILLIN AND CLAVULANATE POTASSIUM 875; 125 MG/1; MG/1
1 TABLET, FILM COATED ORAL EVERY 12 HOURS SCHEDULED
Status: DISCONTINUED | OUTPATIENT
Start: 2023-10-06 | End: 2023-10-07 | Stop reason: HOSPADM

## 2023-10-06 RX ADMIN — Medication 10 ML: at 22:40

## 2023-10-06 RX ADMIN — BUMETANIDE 0.5 MG: 0.5 TABLET ORAL at 10:27

## 2023-10-06 RX ADMIN — PIPERACILLIN SODIUM AND TAZOBACTAM SODIUM 3.38 G: 3; .375 INJECTION, SOLUTION INTRAVENOUS at 04:30

## 2023-10-06 RX ADMIN — AMOXICILLIN AND CLAVULANATE POTASSIUM 1 TABLET: 875; 125 TABLET, FILM COATED ORAL at 22:36

## 2023-10-06 RX ADMIN — PIPERACILLIN SODIUM AND TAZOBACTAM SODIUM 3.38 G: 3; .375 INJECTION, SOLUTION INTRAVENOUS at 12:56

## 2023-10-06 RX ADMIN — HYDROCODONE BITARTRATE AND ACETAMINOPHEN 1 TABLET: 7.5; 325 TABLET ORAL at 01:37

## 2023-10-06 RX ADMIN — DOCUSATE SODIUM 100 MG: 100 CAPSULE, LIQUID FILLED ORAL at 10:27

## 2023-10-06 RX ADMIN — INSULIN LISPRO 1 UNITS: 100 INJECTION, SOLUTION INTRAVENOUS; SUBCUTANEOUS at 17:18

## 2023-10-06 RX ADMIN — TAMSULOSIN HYDROCHLORIDE 0.4 MG: 0.4 CAPSULE ORAL at 10:27

## 2023-10-06 RX ADMIN — Medication 27 MG: at 10:27

## 2023-10-06 RX ADMIN — Medication 250 MG: at 22:35

## 2023-10-06 RX ADMIN — MORPHINE SULFATE 1 MG: 2 INJECTION, SOLUTION INTRAMUSCULAR; INTRAVENOUS at 15:47

## 2023-10-06 RX ADMIN — LATANOPROST 1 DROP: 50 SOLUTION OPHTHALMIC at 22:39

## 2023-10-06 RX ADMIN — Medication 5000 UNITS: at 22:39

## 2023-10-06 RX ADMIN — Medication 10 ML: at 10:29

## 2023-10-06 RX ADMIN — BRIMONIDINE TARTRATE 1 DROP: 2 SOLUTION OPHTHALMIC at 22:39

## 2023-10-06 RX ADMIN — HYDROCODONE BITARTRATE AND ACETAMINOPHEN 1 TABLET: 7.5; 325 TABLET ORAL at 10:54

## 2023-10-06 RX ADMIN — BRIMONIDINE TARTRATE 1 DROP: 2 SOLUTION OPHTHALMIC at 15:47

## 2023-10-06 RX ADMIN — Medication 250 MG: at 10:27

## 2023-10-06 RX ADMIN — SENNOSIDES AND DOCUSATE SODIUM 2 TABLET: 50; 8.6 TABLET ORAL at 10:27

## 2023-10-06 RX ADMIN — MORPHINE SULFATE 1 MG: 2 INJECTION, SOLUTION INTRAMUSCULAR; INTRAVENOUS at 04:40

## 2023-10-06 RX ADMIN — HYDROCODONE BITARTRATE AND ACETAMINOPHEN 1 TABLET: 7.5; 325 TABLET ORAL at 17:15

## 2023-10-06 RX ADMIN — SENNOSIDES AND DOCUSATE SODIUM 2 TABLET: 50; 8.6 TABLET ORAL at 22:40

## 2023-10-06 RX ADMIN — BUMETANIDE 0.5 MG: 0.5 TABLET ORAL at 17:14

## 2023-10-06 RX ADMIN — INSULIN LISPRO 5 UNITS: 100 INJECTION, SOLUTION INTRAVENOUS; SUBCUTANEOUS at 12:55

## 2023-10-06 RX ADMIN — ASPIRIN 81 MG: 81 TABLET, COATED ORAL at 10:27

## 2023-10-06 NOTE — THERAPY EVALUATION
Patient Name: Dilip Verma  : 1950    MRN: [id number]                              Today's Date: 10/6/2023       Admit Date: 10/3/2023    Visit Dx:     ICD-10-CM ICD-9-CM   1. Acute urinary retention  R33.8 788.29   2. Spinal stenosis, unspecified spinal region  M48.00 724.00   3. Gluteal abscess  L02.31 682.5     Patient Active Problem List   Diagnosis    Anxiety    Colon polyp    Type 2 diabetes mellitus with hyperglycemia, with long-term current use of insulin    Erectile dysfunction    Hyperlipidemia    Type 2 acute myocardial infarction    CAD (coronary artery disease)    Hx of CABG    Chronic diastolic heart failure    Hypersomnia due to medical condition    CSA (central sleep apnea)    Periodic breathing    HTN (hypertension)    History of stroke    CKD (chronic kidney disease) stage 3, GFR 30-59 ml/min    Urinary retention    Acute on chronic renal failure    Acute on chronic diastolic (congestive) heart failure    Bacteriuria    Rectal pain    Status post total replacement of hip    Vitamin D deficiency    Post-acute COVID-19 syndrome    Insulin dose changed    Arthropathy associated with neurological disorder    Personal history of colonic polyps    Type 2 diabetes mellitus with diabetic neuropathy, with long-term current use of insulin    Cervical spinal stenosis    Abscess of skin    Overweight    Cervical stenosis of spine    Spinal stenosis, lumbar region, without neurogenic claudication    Cellulitis    Gluteal abscess    Medically noncompliant     Past Medical History:   Diagnosis Date    AMENA (acute kidney injury) 2020    CORI positive     Anemia     Anxiety     Ataxia     Atypical chest pain 2022    SEEN AT [facility]    Balance disorder     Cataract     BILATERAL, S/P EXTRACTION    Cervical radiculopathy 2019    SEEN AT  [facility]    Cervical spinal cord compression     Chronic diastolic (congestive) heart failure     Chronic kidney disease     STAGE 3, FOLLOWED BY DR. VIVAR  "   Chronic pancreatitis     Closed left subtrochanteric femur fracture 12/01/2020    ADMITTED TO [facility]    Closed nondisplaced intertrochanteric fracture of left femur 12/01/2020    ADMITTED TO [facility]    Colon polyps     FOLLOWED BY DR. AVTAR JEONG    Constipation     Contracture, right hand     Coronary artery disease     CABG 7/2019    COVID-19 07/2022    DDD (degenerative disc disease), cervical     Diabetes mellitus, type II     IDDM    Diabetic retinopathy     Dysphagia     Elevated brain natriuretic peptide (BNP) level 08/2014    Elevated LFTs 03/2021    Erectile dysfunction     Foot drop     Fuchs' corneal dystrophy of right eye     Glaucoma     BILATERAL    History of alcohol abuse     Hyperlipidemia     Hypersomnia     Hypertension     Hypertensive urgency 02/25/2020    ADMITTED TO [facility]    Insomnia     Kidney stones     LV dysfunction 06/2016    Lyme disease     Lymphadenopathy syndrome 05/2021    Macular edema     BILATERAL    Myocardial infarction 11/05/2019    NSTEMI, ADMITTED TO [facility]    Myocardial infarction 07/12/2019    NSTEMI, ADMITTED TO [facility]    Non-celiac gluten sensitivity     Orthostasis     Osteoporosis     Oxygen dependent     PAD (peripheral artery disease)     PNA (pneumonia) 06/2016    LEFT LOBE    Polyneuropathy     PTSD (post-traumatic stress disorder)     Pulmonary hypertension     Pulmonary nodule     Senile ectropion of both lower eyelids 02/2022    Sepsis 12/16/2020    D/T UTI, ADMITTED TO [facility]    Sleep apnea     STATES DOESN'T USE BIPAP OR CPAP    Spinal stenosis in cervical region     SEVERE-LIMITED ROM    Stroke 2012    \"slight stroke\"    Syncope and collapse 07/06/2019    ADMITTED TO [place]    Urinary retention 04/05/2021    SEEN AT [facility]    Vitamin D deficiency      Past Surgical History:   Procedure Laterality Date    CARDIAC CATHETERIZATION N/A 07/15/2019    Procedure: Coronary angiography;  Surgeon: Carrie Price MD;  Location:  [facility];  Service: Cardiovascular "    CARDIAC CATHETERIZATION N/A 07/15/2019    Procedure: Left Heart Cath;  Surgeon: Carrie Price MD;  Location:  [facility];  Service: Cardiovascular    CARDIAC CATHETERIZATION N/A 07/15/2019    Procedure: Left ventriculography;  Surgeon: Carrie Price MD;  Location:  [facility];  Service: Cardiovascular    CARDIAC CATHETERIZATION  07/15/2019    Procedure: Functional Flow [place];  Surgeon: Carrie Price MD;  Location:  [facility];  Service: Cardiovascular    CARDIAC CATHETERIZATION N/A 11/06/2019    Procedure: Right and Left Heart Cath;  Surgeon: Mya Smith MD;  Location:  [facility];  Service: Cardiovascular    CARDIAC CATHETERIZATION N/A 11/06/2019    Procedure: Coronary angiography;  Surgeon: Mya Smith MD;  [facility];  Service: Cardiovascular    CARDIAC SURGERY      CATARACT EXTRACTION Left 2014    CATARACT EXTRACTION Right 11/2016    PHACO/IOL, DR. LEN DENTON    COLONOSCOPY N/A 03/16/2023    ENTIRE COLON WNL, RESCOPE IN 5 YRS, DR. LINDA LIZARRAGA AT [facility]    COLONOSCOPY W/ POLYPECTOMY N/A 01/02/2015    A FEW DIVERTICULA IN SIGMOID, 6 MM TUBULOVILLOUS ADENOMA POLYP IN RECTUM, SMALL HEMORRHOIDS, MELANOSIS COLI, DR. AVTAR JEONG AT [facility]    CORONARY ARTERY BYPASS GRAFT N/A 07/18/2019    Procedure: INTRAOPERATIVE SHOAIB; STERNOTOMY CORONARY ARTERY BYPASS x 3  USING LEFT INTERNAL MAMMARY ARTERY GRAFT UTILIZING ENDOSCOPICALLY HARVESTED RIGHT GREATER SAPHENOUS VEIN AND PRP.;  Surgeon: Bill Devi MD;  Location: [facility] OR;  Service: Cardiothoracic    CYSTOSCOPY BLADDER STONE LITHOTRIPSY N/A     DENTAL PROCEDURE Bilateral     3 surgeries inder implants    FEMUR IM NAILING/RODDING Left 12/03/2020    Procedure: LEFT HIP INTRAMEDULLARY NAIL;  Surgeon: Niraj Ravi MD;  Location: [facility] MAIN OR;  Service: Orthopedic Spine;  Laterality: Left;    INCISION AND DRAINAGE PERIRECTAL  ABSCESS N/A 10/4/2023    Procedure: Incision and drainage of perianal and buttock abscess;  Surgeon: Herbie Villatoro MD;  Location: [facility];  Service: General;  Laterality: N/A;    INGUINAL HERNIA REPAIR Bilateral     TOENAIL EXCISION  06/2022    TONSILLECTOMY Bilateral     TOTAL HIP ARTHROPLASTY REVISION Left 01/11/2022    Procedure: TOTAL HIP ARTHROPLASTY REVISION- POSTERIOR;  Surgeon: Jurgen Candelaria II, MD;  Location: [facility];  Service: Orthopedics;  Laterality: Left;    VASECTOMY N/A       General Information       Row Name 10/06/23 0908          OT Time and Intention    Document Type evaluation  -     Mode of Treatment occupational therapy;individual therapy  -       Row Name 10/06/23 0908          General Information    Patient Profile Reviewed yes  -SM     Prior Level of Function --  pt has been bedbound for 2 weeks prior to admission, prior to this pt was mobile to the bathroom with a rollator and going to PT X3/week. Spouse assisted moderately with bathing for safety and all LBD but pt is able to put on shirt  -     Existing Precautions/Restrictions fall  -     Barriers to Rehab medically complex  -       Row Name 10/06/23 0908          Occupational Profile    Environmental Supports and Barriers (Occupational Profile) home DME includes rollator, w/c, power chair, stair lift, ramp, bath bench, grab bars  -       Row Name 10/06/23 0908          Living Environment    People in Home spouse  -       Row Name 10/06/23 0908          Cognition    Orientation Status (Cognition) oriented x 4  -       Row Name 10/06/23 0908          Safety Issues, Functional Mobility    Impairments Affecting Function (Mobility) pain;strength;balance;range of motion (ROM);endurance/activity tolerance  -               User Key  (r) = Recorded By, (t) = Taken By, (c) = Cosigned By      Initials Name Provider Type    SM Nabila Grossman OT Occupational Therapist                      Mobility/ADL's       Row Name 10/06/23 1221          Bed Mobility    Supine-Sit [place] (Bed Mobility) moderate assist (50% patient effort);verbal cues  -     Sit-Supine [place] (Bed Mobility) moderate assist (50% patient effort);verbal cues  -     Assistive Device (Bed Mobility) bed rails;head of bed elevated  -       Row Name 10/06/23 1221          Sit-Stand Transfer    Sit-Stand [place] (Transfers) minimum assist (75% patient effort)  -     Assistive Device (Sit-Stand Transfers) walker, front-wheeled  -       Row Name 10/06/23 1221          Functional Mobility    Functional Mobility- Ind. Level minimum assist (75% patient effort)  -     Functional Mobility- Device walker, front-wheeled  -     Functional Mobility-Distance (Feet) --  60  -     Functional Mobility- Comment unsteady  -       Row Name 10/06/23 1221          Activities of Daily Living    BADL Assessment/Intervention lower body dressing;grooming;feeding  -       Row Name 10/06/23 1221          Lower Body Dressing Assessment/Training    [place] Level (Lower Body Dressing) don;socks;dependent (less than 25% patient effort)  -       Row Name 10/06/23 1221          Grooming Assessment/Training    [place] Level (Grooming) oral care regimen;set up  -     Position (Grooming) edge of bed sitting  -       Row Name 10/06/23 1221          Self-Feeding Assessment/Training    Comment, (Feeding) spouse feeding pt upon arrival, encouraged pt to sit EOB to eat in a better position but declines due to pain in sitting and has spouse feed him in the bed  -               User Key  (r) = Recorded By, (t) = Taken By, (c) = Cosigned By      Initials Name Provider Type     Nabila Grossman OT Occupational Therapist                   Obj/Interventions       Row Name 10/06/23 1223          Sensory Assessment (Somatosensory)    Sensory Assessment neuropathy in hands and feet bilaterally  -       Row Name 10/06/23 1223           Range of Motion Comprehensive    Comment, General Range of Motion R hand decreased digit flex ~75%, R wrist AROM WFL (hyper extends), R shoulder AROM WFL, LUE WFL  -       Row Name 10/06/23 1223          Strength Comprehensive (MMT)    Comment, General Manual Muscle Testing (MMT) Assessment R wrist flex/ext 2-/5, uses accessory muscles to achieve wrist movement/uncoordinated. R digit flex/ext 2-/5. Otherwise BUE at least 3/5  -       Row Name 10/06/23 1223          Balance    Static Sitting Balance standby assist  -SM     Position, Sitting Balance sitting edge of bed  -SM     Static Standing Balance contact guard  -SM     Dynamic Standing Balance minimal assist  -SM     Position/Device Used, Standing Balance supported;walker, rolling  -SM               User Key  (r) = Recorded By, (t) = Taken By, (c) = Cosigned By      Initials Name Provider Type    SM Nabila Grossman OT Occupational Therapist                   Goals/Plan       Row Name 10/06/23 1232          Transfer Goal 1 (OT)    Activity/Assistive Device (Transfer Goal 1, OT) toilet  -SM     [place] Level/Cues Needed (Transfer Goal 1, OT) standby assist  -SM     Time Frame (Transfer Goal 1, OT) short term goal (STG);2 weeks  -SM     Progress/Outcome (Transfer Goal 1, OT) goal ongoing  -       Row Name 10/06/23 1232          Bathing Goal 1 (OT)    Activity/Device (Bathing Goal 1, OT) bathing skills, all  -SM     [place] Level/Cues Needed (Bathing Goal 1, OT) moderate assist (50-74% patient effort)  -SM     Time Frame (Bathing Goal 1, OT) short term goal (STG);2 weeks  -SM     Progress/Outcomes (Bathing Goal 1, OT) goal ongoing  -       Row Name 10/06/23 1232          Toileting Goal 1 (OT)    Activity/Device (Toileting Goal 1, OT) toileting skills, all  -SM     [place] Level/Cues Needed (Toileting Goal 1, OT) contact guard required  -SM     Time Frame (Toileting Goal 1, OT) short term goal (STG);2 weeks  -SM     Progress/Outcome  (Toileting Goal 1, OT) goal ongoing  -SM       Row Name 10/06/23 1232          Grooming Goal 1 (OT)    Activity/Device (Grooming Goal 1, OT) grooming skills, all  -SM     [place] (Grooming Goal 1, OT) standby assist  -SM     Time Frame (Grooming Goal 1, OT) short term goal (STG);2 weeks  -SM     Strategies/Barriers (Grooming Goal 1, OT) standing at sink for 4 min  -SM     Progress/Outcome (Grooming Goal 1, OT) goal ongoing  -SM       Row Name 10/06/23 1232          Therapy Assessment/Plan (OT)    Planned Therapy Interventions (OT) activity tolerance training;functional balance retraining;occupation/activity based interventions;patient/caregiver education/training;transfer/mobility retraining;strengthening exercise  -SM               User Key  (r) = Recorded By, (t) = Taken By, (c) = Cosigned By      Initials Name Provider Type    SM Nabila Grossman, OT Occupational Therapist                   Clinical Impression       Row Name 10/06/23 1226          Pain Assessment    Pain Location - buttock  -SM     Pain Intervention(s) Repositioned;Rest  -SM     Additional Documentation Pain Scale: FACES Pre/Post-Treatment (Group)  -       Row Name 10/06/23 1226          Pain Scale: FACES Pre/Post-Treatment    Pain: FACES Scale, Pretreatment 0-->no hurt  -SM     Posttreatment Pain Rating 4-->hurts little more  -SM       Row Name 10/06/23 1226          Plan of Care Review    Plan of Care Reviewed With patient  -SM     Outcome Evaluation Pt is a 73 y.o male admitted with sacral wound/absess and urinary retension. Pt s/p I & D of perianal and buttocks on 10/4. Pt is very medically complex with hx of CVA with R sided weakness (hand), severe lumbar and cervical stenosis, DM, bilateral foot drop, CAD/CABG, neuropathy, CKD, L hip fx ~ 1 year ago resulting in LLE 1 inch shorter than R. Pt reports he has not been able to get up for 2 weeks prior to admission due to illness but prior to this he was more independent. 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 Pt poorly tolerates sitting during ADLs due to pain but does wish to stand and mobilize in the room today with rwx. Initally wanted to stand at sink but started to feel dizzy which he reports is normal for him and was able to sit EOB for a few minutes to engage in ADLs. OT recommends continued OT to increase activity, endurance, safety/balance. He has all needed DME and does wish to return home with HH.  -       Row Name 10/06/23 1226          Therapy Assessment/Plan (OT)    Rehab Potential (OT) fair, will monitor progress closely  -     Criteria for Skilled Therapeutic Interventions Met (OT) yes;skilled treatment is necessary  -     Therapy Frequency (OT) 3 times/wk  -       Row Name 10/06/23 1226          Therapy Plan Review/Discharge Plan (OT)    Anticipated Discharge Disposition (OT) home with home health;home with 24/7 [facility]  -       Row Name 10/06/23 1226          Positioning and Restraints    Pre-Treatment Position in bed  -     Post Treatment Position bed  -SM     In Bed side lying left;call light within reach;encouraged to call for assist;exit alarm on;notified nsg;with family/caregiver  -               User Key  (r) = Recorded By, (t) = Taken By, (c) = Cosigned By      Initials Name Provider Type     Nabila Grossman, OT Occupational Therapist                   Outcome Measures       Row Name 10/06/23 1233          How much help from another is currently needed...    Putting on and taking off regular lower body clothing? 1  -SM     Bathing (including washing, rinsing, and drying) 2  -SM     Toileting (which includes using toilet bed pan or urinal) 1  -SM     Putting on and taking off regular upper body clothing 2  -SM     Taking care of personal grooming (such as brushing teeth) 3  -SM     Eating meals 2  -SM     AM-PAC 6 " Clicks Score (OT) 11  -       Row Name 10/06/23 1233          Functional Assessment    Outcome Measure Options AM-PAC 6 Clicks Daily Activity (OT)  -               User Key  (r) = Recorded By, (t) = Taken By, (c) = Cosigned By      Initials Name Provider Type     Nabila Grossman OT Occupational Therapist                    Occupational Therapy Education       Title: PT OT SLP Therapies (In Progress)       Topic: Occupational Therapy (In Progress)       Point: ADL training (Done)       Description:   Instruct learner(s) on proper safety adaptation and remediation techniques during self care or transfers.   Instruct in proper use of assistive devices.                  Learning Progress Summary             Patient Acceptance, E, VU by  at 10/6/2023 1234    Comment: OT goals, POC, position changes and shear risk during ADLs/bed mobility                         Point: Home exercise program (Not Started)       Description:   Instruct learner(s) on appropriate technique for monitoring, assisting and/or progressing therapeutic exercises/activities.                  Learner Progress:  Not documented in this visit.              Point: Precautions (Not Started)       Description:   Instruct learner(s) on prescribed precautions during self-care and functional transfers.                  Learner Progress:  Not documented in this visit.              Point: Body mechanics (Not Started)       Description:   Instruct learner(s) on proper positioning and spine alignment during self-care, functional mobility activities and/or exercises.                  Learner Progress:  Not documented in this visit.                              User Key       Initials Effective Dates Name Provider Type [facility] 04/02/20 -  Nabila Grossman OT Occupational Therapist OT                  OT Recommendation and Plan  Planned Therapy Interventions (OT): activity tolerance training, functional balance retraining, occupation/activity  "based interventions, patient/caregiver education/training, transfer/mobility retraining, strengthening exercise  Therapy Frequency (OT): 3 times/wk  Plan of Care Review  Plan of Care Reviewed With: patient  Outcome Evaluation: Pt is a 73 y.o male admitted with sacral wound/absess and urinary retension. Pt s/p I & D of perianal and buttocks on 10/4. Pt is very medically complex with hx of CVA with R sided weakness (hand), severe lumbar and cervical stenosis, DM, bilateral foot drop, CAD/CABG, neuropathy, CKD, L hip fx ~ 1 year ago resulting in LLE 1 inch shorter than R. Pt reports he has not been able to get up for 2 weeks prior to admission due to illness but prior to this he was more independent. 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 Pt poorly tolerates sitting during ADLs due to pain but does wish to stand and mobilize in the room today with rwx. Initally wanted to stand at sink but started to feel dizzy which he reports is normal for him and was able to sit EOB for a few minutes to engage in ADLs. OT recommends continued OT to increase activity, endurance, safety/balance. He has all needed DME and does wish to return home with HH.     Time Calculation:   Evaluation Complexity (OT)  Review Occupational Profile/Medical/Therapy History Complexity: expanded/moderate complexity  Assessment, Occupational Performance/Identification of Deficit Complexity: 3-5 performance deficits  Clinical Decision Making Complexity (OT): detailed assessment/moderate complexity  Overall Complexity of Evaluation (OT): moderate complexity     Time Calculation- OT       Row Name 10/06/23 1235             Time Calculation- OT    OT Start Time 0943  -      OT Stop Time 1010  -      OT Time Calculation (min) 27 min  -      Total Timed Code Minutes- OT 20 minute(s)  -      OT Received On " 10/06/23  -      OT - Next Appointment 10/09/23  -      OT Goal Re-Cert Due Date 10/20/23  -         Timed Charges    33488 - OT Self Care/Mgmt Minutes 20  -SM         Untimed Charges    OT Eval/Re-eval Minutes 7  -SM         Total Minutes    Timed Charges Total Minutes 20  -SM      Untimed Charges Total Minutes 7  -SM       Total Minutes 27  -SM                User Key  (r) = Recorded By, (t) = Taken By, (c) = Cosigned By      Initials Name Provider Type     Nabila Grossman OT Occupational Therapist                  Therapy Charges for Today       Code Description Service Date Service Provider Modifiers Qty    61410280826 HC OT SELF CARE/MGMT/TRAIN EA 15 MIN 10/6/2023 Nabila Grossman OT GO 1    40264496481 HC OT EVAL MOD COMPLEXITY 3 10/6/2023 Nabila Grossman OT GO 1                 Nabila Grossman OT  10/6/2023

## 2023-10-06 NOTE — TELEPHONE ENCOUNTER
CALLED PATIENT AND LEFT VM. ASKED PATIENT TO CALL BACK SO THAT WE CAN SCHEDULED A 4 MONTH F/U PER NURSE BURCIAGA AND .

## 2023-10-06 NOTE — SIGNIFICANT NOTE
10/06/23 1500   OTHER   Discipline physical therapy assistant   Rehab Time/Intention   Session Not Performed patient/family declined treatment  (Pt declined PT treatment today stating he just got comfortable and not in pain. Pt states he worked with OT and walked a little bit today. Will f/u with pt over the weekend.)   Recommendation   PT - Next Appointment 10/07/23

## 2023-10-06 NOTE — CASE MANAGEMENT/SOCIAL WORK
Discharge Planning Assessment  [facility]     Patient Name: Dilip Verma  MRN: [id number]  Today's Date: 10/6/2023    Admit Date: 10/3/2023    Plan: Home with family to transport. Referral for HH pending.   Discharge Needs Assessment       Row Name 10/06/23 1655       Living Environment    People in Home spouse    Current Living Arrangements home    Potentially Unsafe Housing Conditions none    Primary Care Provided by self;spouse/significant other    Provides Primary Care For no one    Family Caregiver if Needed spouse    Quality of Family Relationships supportive;helpful;involved    Able to Return to Prior Arrangements yes       Resource/Environmental Concerns    Resource/Environmental Concerns none    Transportation Concerns none       Transition Planning    Patient/Family Anticipates Transition to home with family    Patient/Family Anticipated Services at Transition     Transportation Anticipated family or friend will provide       Discharge Needs Assessment    Readmission Within the Last 30 Days no previous admission in last 30 days    Equipment Currently Used at Home rollator;power chair,(recliner lift);shower chair;wheelchair;grab bar;lift device;bath bench;wound care supplies;glucometer;ramp    Concerns to be Addressed denies needs/concerns at this time;discharge planning    Anticipated Changes Related to Illness none    Equipment Needed After Discharge none    Outpatient/Agency/Support Group Needs homecare agency    Discharge Facility/Level of Care Needs home with home health                   Discharge Plan       Row Name 10/06/23 1700       Plan    Plan Home with family to transport. Referral for HH pending.    Roadmap to Recovery Yes    Patient/Family in Agreement with Plan yes    Provided Post Acute Provider List? Yes    Post Acute Provider List Home Health    Provided Post Acute Provider Quality & Resource List? Yes    Post Acute Provider Quality and Resource List Home Health     Delivered To Patient    Method of Delivery In person    Plan Comments Spoke with patient and spouse/ Beny at bedside. Introduced self and explained CCP role. Verified face sheet and local pharmacy is [facility]; patient chose to enroll. Patient denies difficulty with medication cost/ management. Patient states that he has used [facility] and [facility] and would like referrals to either. Patient has been to [facility] and [facility] prior and would not return to [facility]. Patient states that they have a rollator, transfer chair, stair lift, shower/ bsc, and grab bars. Patient states that home is two stories with a ramp to enter. Patient plans to return home with family to transport.                  Continued Care and Services - Admitted Since 10/3/2023       Home Medical Care       Service Provider Request Status Selected Services Address Phone Fax Patient Preferred    [facility] Pending - Request Sent N/A [address], [address], [place] [address] [phone] --    [facility] Declined  [facility]AC[facility] N/A [address] [facility], [address], [place] [id number] [phone] --                  Expected Discharge Date and Time       Expected Discharge Date Expected Discharge Time    Oct 6, 2023            Demographic Summary       Row Name 10/06/23 7348       General Information    Admission Type observation    Required Notices Provided Observation Status Notice    Referral Source admission list    Reason for Consult discharge planning    Preferred Language English       Contact Information    Permission Granted to Share Info With                    Functional Status       Row Name 10/06/23 1654       Functional Status    Usual Activity Tolerance moderate    Current Activity Tolerance moderate       Functional Status, IADL    Medications assistive person    Meal Preparation assistive person    Housekeeping assistive person    Laundry  assistive person    Shopping completely dependent       Mental Status    General Appearance WDL WDL       Mental Status Summary    Recent Changes in Mental Status/Cognitive Functioning no changes       Employment/    Employment Status retired                   Psychosocial    No documentation.                  Abuse/Neglect    No documentation.                  Legal    No documentation.                  Substance Abuse    No documentation.                  Patient Forms    No documentation.                     Maru Deluca RN

## 2023-10-06 NOTE — PROGRESS NOTES
[facility] THORACIC/LUMBAR NEUROSURGERY PROGRESS NOTE      CC: Urinary retention, history of cervical and lumbar stenosis.      Subjective     Interval History: No significant changes on exam.  No acute issues overnight.  Hopkins catheter removed this morning.  Patient is yet to void.  Last bowel movement just prior to surgical procedure without issues.    ROS:  Constitutional: No fever, chills  MS: No back pain  Neuro: No numbness, tingling, or weakness,  no balance difficulties  : No difficulty voiding, no incontinence    Objective     Vital signs in last 24 hours:  Temp:  [97.5 °F (36.4 °C)-97.9 °F (36.6 °C)] 97.9 °F (36.6 °C)  Heart Rate:  [70-74] 73  Resp:  [16] 16  BP: (134-175)/() 134/118    Intake/Output this shift:  No intake/output data recorded.    LABS:  .  Results from last 7 days   Lab Units 10/06/23  0651 10/05/23  0648 10/04/23  0641   WBC 10*3/mm3 7.57 10.42 14.07*   HEMOGLOBIN g/dL 14.7 13.6 15.3   HEMATOCRIT % 43.2 39.9 45.6   PLATELETS 10*3/mm3 249 259 300     .  Results from last 7 days   Lab Units 10/06/23  0651 10/05/23  1634 10/05/23  0648 10/04/23  0641   SODIUM mmol/L 138  --  141 139   POTASSIUM mmol/L 4.4 4.2 3.6 3.9   CHLORIDE mmol/L 102  --  101 98   CO2 mmol/L 31.8*  --  33.8* 30.3*   BUN mg/dL 15  --  15 14   CREATININE mg/dL 1.04  --  1.25 0.92   CALCIUM mg/dL 8.7  --  8.5* 9.0   BILIRUBIN mg/dL 0.4  --  0.5 0.5   ALK PHOS U/L 56  --  53 69   ALT (SGPT) U/L 28  --  22 20   AST (SGOT) U/L 20  --  20 15   GLUCOSE mg/dL 216*  --  201* 233*         IMAGING STUDIES:    MRI cervical spine with and without contrast 10/5/2023:    FINDINGS:  Multiplanar images of the cervical spine obtained without and  with gadolinium.  Axial images obtained from C2-T1.     No acute fracture or subluxation of the cervical spine is seen.  Intervertebral disc space narrowing is noted at multiple levels. Patient  is again noted to have some marrow edema at C3-C4. This appears stable  when compared to prior  study. This area does show some enhancement  following contrast administration. The edema has been felt to be  secondary to degenerative endplate marrow. Mild retrolisthesis of C3 on  C4 and C4 on C5 is stable. Prior study did describe some subtle cord  signal abnormality at the level of C3. This appears unchanged when  compared to prior exam. No new areas of cord signal abnormality are  identified. I don't see any convincing enhancement of the cord within  this area. Degenerative changes appear to be stable when compared to the  exam from July 11, 2023. Please see that report for detailed description  of degenerative findings. There is potentially abnormal or absent signal  within the left vertebral artery. This is poorly assessed on this MRI of  the spine. CT angiography could be considered for further assessment,  particularly if the patient has any referable symptomatology.        IMPRESSION:    1. Stable findings when compared to July 11, 2023. Please see that  report for full description of degenerative findings.        MRI lumbar spine with and without contrast 10/5/2023:    FINDINGS:  Multiplanar images of the lumbar spine obtained without and  with gadolinium.  Axial images obtained from T12-S1.     No acute fracture or subluxation of the lumbar spine is seen.  Retrolisthesis of L1 and L2 and L2 on L3 is stable. There is also some  retrolisthesis of L3 on L4. Intervertebral disc space narrowing is noted  at multiple levels. Conus terminates at L1-L2. There appears to be some  increasing marrow edema at L2, although it is a relatively mild  increase. Marrow edema at L3 appears to be stable. It is associated with  some enhancement. Both of these are favored to be secondary to chronic  degenerative endplate change. Degenerative changes do appear to be  stable when compared to the exam from July 11, 2023. Please see that  report for full description of the degenerative findings.     IMPRESSION:  1. Stable  degenerative findings when compared to July 11, 2023. Please  see that report for full description of degenerative findings.     I reviewed Lumbar and cervical MRI done on 10/6/23 on the date of 10/7/23 and discussed with Dr. Solis.      I personally viewed and interpreted the patient's labs, imaging study, medications, and chart.    Meds reviewed/changed: Yes    Current Facility-Administered Medications:     acetaminophen (TYLENOL) tablet 650 mg, 650 mg, Oral, Q4H PRN **OR** acetaminophen (TYLENOL) 160 MG/5ML oral solution 650 mg, 650 mg, Oral, Q4H PRN **OR** acetaminophen (TYLENOL) suppository 650 mg, 650 mg, Rectal, Q4H PRN, Herbie Villatoro MD    aspirin EC tablet 81 mg, 81 mg, Oral, Daily, Herbie Villatoro MD, 81 mg at 10/05/23 0840    sennosides-docusate (PERICOLACE) 8.6-50 MG per tablet 2 tablet, 2 tablet, Oral, BID, 2 tablet at 10/05/23 2209 **AND** polyethylene glycol (MIRALAX) packet 17 g, 17 g, Oral, Daily PRN **AND** bisacodyl (DULCOLAX) EC tablet 5 mg, 5 mg, Oral, Daily PRN **AND** bisacodyl (DULCOLAX) suppository 10 mg, 10 mg, Rectal, Daily PRN, Herbie Villatoro MD    brimonidine (ALPHAGAN) 0.2 % ophthalmic solution 1 drop, 1 drop, Both Eyes, BID, Herbie Villatoro MD, 1 drop at 10/05/23 2206    bumetanide (BUMEX) injection 1 mg, 1 mg, Intravenous, Once, Herbie Villatoro MD    bumetanide (BUMEX) tablet 0.5 mg, 0.5 mg, Oral, BID, Herbie Villatoro MD, 0.5 mg at 10/05/23 1745    calcium carbonate (TUMS) chewable tablet 500 mg (200 mg elemental), 2 tablet, Oral, BID PRN, Herbie Villatoro MD    Calcium Replacement - Follow Nurse / BPA Driven Protocol, , Does not apply, PRN, Herbie Villatoro MD    cholecalciferol (VITAMIN D3) tablet 5,000 Units, 5,000 Units, Oral, Q24H, Herbie Villatoro MD, 5,000 Units at 10/04/23 1821    dextrose (D50W) (25 g/50 mL) IV injection 10-50 mL, 10-50 mL, Intravenous, Q15 Min PRN, Herbie Villatoro MD     dextrose (GLUTOSE) oral gel 15 g, 15 g, Oral, Q15 Min PRN, Herbie Villatoro MD    docusate sodium (COLACE) capsule 100 mg, 100 mg, Oral, Daily, Herbie Villatoro MD, 100 mg at 10/03/23 1901    Glucagon (GLUCAGEN) injection 1 mg, 1 mg, Intramuscular, Q15 Min PRN, Herbie Villatoro MD    HYDROcodone-acetaminophen (NORCO) 7.5-325 MG per tablet 1 tablet, 1 tablet, Oral, Q6H PRN, Amador Valverde MD, 1 tablet at 10/06/23 0137    insulin glargine (LANTUS, SEMGLEE) injection 1-200 Units, 1-200 Units, Subcutaneous, Nightly - Glucommander, Herbie Villatoro MD, 26 Units at 10/05/23 2247    insulin lispro (HUMALOG/ADMELOG) injection 1-200 Units, 1-200 Units, Subcutaneous, 4x Daily With Meals & Nightly, Herbie Villatoro MD, 3 Units at 10/05/23 2248    insulin lispro (HUMALOG/ADMELOG) injection 1-200 Units, 1-200 Units, Subcutaneous, PRN, Herbie Villatoro MD, 2 Units at 10/03/23 2230    lactated ringers infusion, 9 mL/hr, Intravenous, Continuous, Herbie Villatoro MD, Last Rate: 9 mL/hr at 10/05/23 0542, 9 mL/hr at 10/05/23 0542    latanoprost (XALATAN) 0.005 % ophthalmic solution 1 drop, 1 drop, Both Eyes, Nightly, Herbie Villatoro MD, 1 drop at 10/05/23 2207    magnesium (as) gluconate (MAGONATE) tablet 27 mg, 27 mg, Oral, Daily, Herbie Villatoro MD, 27 mg at 10/05/23 0843    Magnesium Standard Dose Replacement - Follow Nurse / BPA Driven Protocol, , Does not apply, PRN, Herbie Villatoro MD    melatonin tablet 3 mg, 3 mg, Oral, Nightly PRN, Herbie Villatoro MD    morphine injection 1 mg, 1 mg, Intravenous, Q4H PRN, Amador Valverde MD, 1 mg at 10/06/23 0440    ondansetron (ZOFRAN) tablet 4 mg, 4 mg, Oral, Q6H PRN **OR** ondansetron (ZOFRAN) injection 4 mg, 4 mg, Intravenous, Q6H PRN, Herbie Villatoro MD    Phosphorus Replacement - Follow Nurse / BPA Driven Protocol, , Does not apply, PRN, Herbie Villatoro MD     piperacillin-tazobactam (ZOSYN) 3.375 g in iso-osmotic dextrose 50 ml (premix), 3.375 g, Intravenous, Q8H, Herbie Villatoro MD, Last Rate: 0 mL/hr at 10/05/23 0013, 3.375 g at 10/06/23 0430    Potassium Replacement - Follow Nurse / BPA Driven Protocol, , Does not apply, PRN, Herbie Villatoro MD    saccharomyces boulardii (FLORASTOR) capsule 250 mg, 250 mg, Oral, BID, Herbie Villatoro MD, 250 mg at 10/05/23 2206    sodium chloride 0.9 % flush 10 mL, 10 mL, Intravenous, Q12H, Herbie Villatoro MD, 10 mL at 10/05/23 2210    sodium chloride 0.9 % flush 10 mL, 10 mL, Intravenous, PRN, Herbie Villatoro MD    sodium chloride 0.9 % infusion 40 mL, 40 mL, Intravenous, PRN, Herbie Villatoro MD    tamsulosin (FLOMAX) 24 hr capsule 0.4 mg, 0.4 mg, Oral, Daily, Sukh Jaime MD, 0.4 mg at 10/05/23 0842    testosterone (ANDROGEL) gel 25 mg, 25 mg, Transdermal, Daily, Herbie Villatoro MD    traMADol (ULTRAM) tablet 50 mg, 50 mg, Oral, Nightly PRN, Herbie Villatoro MD      Physical Exam:    General:   Awake, alert.  Back:      Motor:    See below for muscle strength  Sensation:   Baseline neuropathy  Station and Gait:             Per PT note 10/5/2023:. Patient is (I) at baseline and lives with his wife. He states prior to getting sick recently, he was normally able to ambulate well. Today, patient performed bed mobility with min/modA x2, required modA x2 for transfers, and ambulated Tank x2 5' side steps to HOB. Pt significantly limited 2/2 pain in buttock area d/t sacral wound. He has difficulty performing transitional movements and would likely not tolerate sitting UIC. Strength, balance, and activity tolerance deficits noted. Patient may benefit from skilled PT services acutely to address functional deficits as well as improve level of independence prior to discharge. Anticipate SNF upon DC.   Extremities:   Wearing SCD    Right iliopsoas: 5/5  Left  "iliopsoas:   Right quadriceps:   Left quadriceps:   Right hamstrin/5  Left hamstrin/5  Right anterior tibial: 3/5  Left anterior tibial: 3/5  Right posterior tibial:   Left posterior tibial:   Right gastroc:   Left gastroc:   Patient has chronic foot drop for \"past 7 to 8 years\"     Patient has contracture of right hand chronic and relatively unchanged for past \"4 years\" per patient           Assessment & Plan     ASSESSMENT:      Gluteal abscess    CAD (coronary artery disease)    Hx of CABG    Chronic diastolic heart failure    CSA (central sleep apnea)    HTN (hypertension)    History of stroke    CKD (chronic kidney disease) stage 3, GFR 30-59 ml/min    Urinary retention    Type 2 diabetes mellitus with diabetic neuropathy, with long-term current use of insulin    Cervical stenosis of spine    Spinal stenosis, lumbar region, without neurogenic claudication    Cellulitis    Medically noncompliant    Patient with chronic cervical and lumbar spinal stenosis with 4 days of urinary retention as well as gluteal abscess. The patient apparently has had issues with urinary retention in the past and has required Hopkins catheterization and is [facility].  He continues to deny any new symptoms.  He is just had his Hopkins catheter removed and has not urinated yet but states that he is moving his bowels without issue and denying saddle anesthesia.  MRI of the cervical and lumbar spine does not show any significant change from MRIs obtained in July of this year.  Neurosurgery will sign off at this time as there is no intervention indicated at this time.  We will follow-up with the patient in the outpatient setting in about 4 months from now.  He is to call in the meantime if he develops any upper or lower extremity pain, weakness, numbness, tingling, new bowel or bladder incontinence or saddle anesthesia.      PLAN:     No neurosurgical intervention indicated currently  Follow-up " "neurosurgery in 4 months  Urology to continue management        I discussed the patient's findings and my recommendations with patient, family, and Dr. Solis.    During patient visit, I utilized appropriate personal protective equipment including mask and gloves.  Mask used was standard procedure mask. Appropriate PPE was worn during the entire visit.  Hand hygiene was completed before and after.      LOS: 0 days       Herbie Christie, APRN  10/6/2023  09:02 EDT    \"Dictated utilizing Dragon dictation\".    "

## 2023-10-06 NOTE — PLAN OF CARE
Goal Outcome Evaluation:  AOX4.  VSS.  Patient requires encouragement to turn.  Up with assist x 1 w/ walker.  Insulin given per glucommander protocol. Wound care performed, per order.  Light catheter removed at 0600.  Patient unable to void but bladder scanner reveals less than 500 mL's at this time.  Amounts charted.  Per Dr. Valverde ([facility]), continue to monitor for urine volume greater than 500 mL, then OK to place light catheter.  See Urology note about patient following up in clinic upon discharge.

## 2023-10-06 NOTE — PLAN OF CARE
"Goal Outcome Evaluation:  Plan of Care Reviewed With: patient           Outcome Evaluation: Pt is a 73 y.o male admitted with sacral wound/absess and urinary retension. Pt s/p I & D of perianal and buttocks on 10/4. Pt is very medically complex with hx of CVA with R sided weakness (hand), severe lumbar and cervical stenosis, DM, bilateral foot drop, CAD/CABG, neuropathy, CKD, L hip fx ~ 1 year ago resulting in LLE 1 inch shorter than R. Pt reports he has not been able to get up for 2 weeks prior to admission due to illness but prior to this he was more independent. 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 Pt poorly tolerates sitting during ADLs due to pain but does wish to stand and mobilize in the room today with rwx. Initally wanted to stand at sink but started to feel dizzy which he reports is normal for him and was able to sit EOB for a few minutes to engage in ADLs. OT recommends continued OT to increase activity, endurance, safety/balance. He has all needed DME and does wish to return home with HH.      Anticipated Discharge Disposition (OT): home with home health, home with 24/7 care  "

## 2023-10-06 NOTE — PROGRESS NOTES
Name: Dilip Verma ADMIT: 10/3/2023   : 1950  PCP: Tena Taylor MD    MRN: [id number] LOS: 0 days   AGE/SEX: 73 y.o. male  ROOM: [facility]     Subjective   Subjective   Feeling okay today.  No N/V/D/F/C/NS/SOA/CP/palp/HA/vis changes/abd pain/back pain.  Tolerating po.   Hopkins out today, still hasn't voided yet       Objective   Objective   Vital Signs  Temp:  [97.9 °F (36.6 °C)] 97.9 °F (36.6 °C)  Heart Rate:  [73-78] 78  Resp:  [16] 16  BP: (134-175)/() 144/80  SpO2:  [90 %-98 %] 90 %  on  Flow (L/min):  [2] 2;   Device (Oxygen Therapy): room air  Body mass index is 28.28 kg/m².    (No change in exam today)    Physical Exam  Vitals and nursing note reviewed. Exam conducted with a chaperone present (Wife).   Constitutional:       General: He is not in acute distress.     Appearance: He is ill-appearing (chronically). He is not toxic-appearing or diaphoretic.   HENT:      Head: Normocephalic.      Nose: Nose normal.      Mouth/Throat:      Mouth: Mucous membranes are moist.      Pharynx: Oropharynx is clear.   Eyes:      General: No scleral icterus.        Right eye: No discharge.         Left eye: No discharge.      Extraocular Movements: Extraocular movements intact.      Conjunctiva/sclera: Conjunctivae normal.   Cardiovascular:      Rate and Rhythm: Normal rate and regular rhythm.      Pulses: Normal pulses.   Pulmonary:      Effort: Pulmonary effort is normal. No respiratory distress.      Breath sounds: Normal breath sounds. No wheezing or rales.   Abdominal:      General: Bowel sounds are normal. There is no distension.      Palpations: Abdomen is soft.      Tenderness: There is no abdominal tenderness.   Musculoskeletal:         General: Deformity (contractures of BLEs and RUE) present. No swelling.      Cervical back: Neck supple. No rigidity.   Skin:     General: Skin is warm and dry.      Capillary Refill: Capillary refill takes less than 2 seconds.      Coloration: Skin is pale.  Skin is not jaundiced.   Neurological:      Mental Status: He is alert and oriented to person, place, and time. Mental status is at baseline.      Cranial Nerves: No cranial nerve deficit.      Comments: Bilateral foot drop   Psychiatric:         Mood and Affect: Mood normal.     Results Review     I reviewed the patient's new clinical results.  Results from last 7 days   Lab Units 10/06/23  0651 10/05/23  0648 10/04/23  0641 10/03/23  1245   WBC 10*3/mm3 7.57 10.42 14.07* 12.39*   HEMOGLOBIN g/dL 14.7 13.6 15.3 16.2   PLATELETS 10*3/mm3 249 259 300 271       Results from last 7 days   Lab Units 10/06/23  0651 10/05/23  1634 10/05/23  0648 10/04/23  0641 10/03/23  1245   SODIUM mmol/L 138  --  141 139 135*   POTASSIUM mmol/L 4.4 4.2 3.6 3.9 4.3   CHLORIDE mmol/L 102  --  101 98 92*   CO2 mmol/L 31.8*  --  33.8* 30.3* 28.5   BUN mg/dL 15  --  15 14 12   CREATININE mg/dL 1.04  --  1.25 0.92 0.98   GLUCOSE mg/dL 216*  --  201* 233* 263*   EGFR mL/min/1.73 75.3  --  60.4 87.3 80.9       Results from last 7 days   Lab Units 10/06/23  0651 10/05/23  0648 10/04/23  0641 10/03/23  1245   ALBUMIN g/dL 3.0* 2.8* 3.0* 3.2*   BILIRUBIN mg/dL 0.4 0.5 0.5 0.6   ALK PHOS U/L 56 53 69 73   AST (SGOT) U/L 20 20 15 11   ALT (SGPT) U/L 28 22 20 23       Results from last 7 days   Lab Units 10/06/23  0651 10/05/23  0648 10/04/23  0641 10/03/23  1814 10/03/23  1245   CALCIUM mg/dL 8.7 8.5* 9.0  --  9.3   ALBUMIN g/dL 3.0* 2.8* 3.0*  --  3.2*   MAGNESIUM mg/dL 1.9 1.9  --  1.9  --        Results from last 7 days   Lab Units 10/03/23  1814   PROCALCITONIN ng/mL 0.09   LACTATE mmol/L 1.6       Glucose   Date/Time Value Ref Range Status   10/06/2023 1213 285 (H) 70 - 130 mg/dL Final   10/06/2023 0809 212 (H) 70 - 130 mg/dL Final   10/05/2023 2230 233 (H) 70 - 130 mg/dL Final   10/05/2023 1735 218 (H) 70 - 130 mg/dL Final   10/05/2023 1220 249 (H) 70 - 130 mg/dL Final   10/05/2023 0808 184 (H) 70 - 130 mg/dL Final   10/04/2023 2047 194 (H)  70 - 130 mg/dL Final       MRI Cervical Spine With & Without Contrast    Result Date: 10/6/2023  1. Stable findings when compared to July 11, 2023. Please see that report for full description of degenerative findings.    This report was finalized on 10/6/2023 1:25 AM by Dr. Gayle Okeefe M.D on Workstation: [email or web address]      MRI Lumbar Spine With & Without Contrast    Result Date: 10/6/2023  1. Stable degenerative findings when compared to July 11, 2023. Please see that report for full description of degenerative findings.    This report was finalized on 10/6/2023 1:44 AM by Dr. Gayle Okeefe M.D on Workstation: [email or web address]       I have personally reviewed all medications:  Scheduled Medications  aspirin, 81 mg, Oral, Daily  brimonidine, 1 drop, Both Eyes, BID  bumetanide, 1 mg, Intravenous, Once  bumetanide, 0.5 mg, Oral, BID  cholecalciferol, 5,000 Units, Oral, Q24H  docusate sodium, 100 mg, Oral, Daily  insulin glargine, 1-200 Units, Subcutaneous, Nightly - Glucommander  insulin lispro, 1-200 Units, Subcutaneous, 4x Daily With Meals & Nightly  latanoprost, 1 drop, Both Eyes, Nightly  magnesium (as) gluconate, 27 mg, Oral, Daily  piperacillin-tazobactam, 3.375 g, Intravenous, Q8H  saccharomyces boulardii, 250 mg, Oral, BID  senna-docusate sodium, 2 tablet, Oral, BID  sodium chloride, 10 mL, Intravenous, Q12H  tamsulosin, 0.4 mg, Oral, Daily  testosterone, 25 mg, Transdermal, Daily    Infusions  lactated ringers, 9 mL/hr, Last Rate: 9 mL/hr (10/05/23 0542)    Diet  Diet: Renal Diets; Low Potassium, Low Phosphorus; Texture: Regular Texture (IDDSI 7); Fluid Consistency: Thin (IDDSI 0)    I have personally reviewed:  [x]  Laboratory   [x]  Microbiology   []  Radiology   []  EKG/Telemetry  []  Cardiology/Vascular   []  Pathology    []  Records       Assessment/Plan     Active Hospital Problems    Diagnosis  POA    **Gluteal abscess [L02.31]  Yes    Cervical stenosis of spine [M48.02]  Yes    Spinal stenosis,  lumbar region, without neurogenic claudication [M48.061]  Yes    Cellulitis [L03.90]  Yes    Medically noncompliant [Z91.199]  Not Applicable    Type 2 diabetes mellitus with diabetic neuropathy, with long-term current use of insulin [E11.40, Z79.4]  Not Applicable    Urinary retention [R33.9]  Yes    CKD (chronic kidney disease) stage 3, GFR 30-59 ml/min [N18.30]  Yes    History of stroke [Z86.73]  Not Applicable    HTN (hypertension) [I10]  Yes    CSA (central sleep apnea) [G47.31]  Yes    Chronic diastolic heart failure [I50.32]  Yes    Hx of CABG [Z95.1]  Not Applicable    CAD (coronary artery disease) [I25.10]  Yes      Resolved Hospital Problems   No resolved problems to display.       72yo gentleman with chronic cervical and lumbar spinal stenosis, bilateral foot drop, right hand contracture, peripheral neuropathy, as well as diastolic CHF, CAD/CABG, sleep apnea, HTN, CKD3, HTN, prior CVA, DM2, and HLD, who presented to ER with a couple days of urinary retention and 2 weeks of a gluteal cleft abscess that failed outpt oral abx therapy.     Complex right gluteal abscess: s/p I&D 10/4 by Dr. Villatoro, will need 5d total abx per Surg, will change IV Zosyn to PO Augmentin today, operative cultures unremarkable so far, PCT and LA wnl, afebrile, WBC has normalized  Continue wet to dry packing until heals  F/u with Dr. Villatoro in 2 weeks    Urinary retention: has h/o urinary retention in past, followed by Dr. Velasco but lost to f/u for past 18mo  ?Due to BPH vs Neurogenic bladder  Appreciate  attention to pt, voiding trial today  Will need outpt urodynamics eventually in office  BRISSA does not feel due to spine issues    Severe, chronic lumbar and cervical spinal stenosis  Bilateral foot drop  Right hand contracture  Peripheral neuropathy: stable at baseline, tramadol continued, requiring some additional analgesia for postop pain    DM2: A1c 8.9, continue basal/bolus insulin per Glucommander, sugars a bit worse  today    Chronic diastolic CHF: BNP quite high on admission, s/p dose of IV Bumex, appears euvolemic at present, no pulm edema on CXR, continue oral Bumex, monitor UOP and renal function    CKD3a: Cr wnl here, monitor    HTN: BPs labile, not on meds PTA, monitor    Prior CVA: continue ASA, not on statin for some reason    Central sleep apnea: continue supplemental O2 when sleeping, pt has not seen his sleep MD in 3 years, did not tolerate BiPAP, uses nasal cannula O2 at home whenever he sleeps    CAD/CABG: no s/sx of ACS, continue ASA, not on statin/JOHN agent/BB    Prior Alcohol Abuse: not currently drinking alcohol      SCDs for DVT prophylaxis.  Full code.  Discussed with patient and wife. D/w RN.  Anticipate discharge to SNF per PT recs.      Amador Valverde MD  [facility]  10/06/23  13:46 EDT

## 2023-10-06 NOTE — DISCHARGE PLACEMENT REQUEST
"Dilip Reilly \"Ck\" (73 y.o. Male)       Date of Birth   1950    Social Security Number       Address   [address] [place] [address]    Home Phone   [phone]    MRN   [id number]       Gnosticism   Non-Restoration    Marital Status                               Admission Date   10/3/23    Admission Type   Emergency    Admitting Provider   Minal Haley MD    Attending Provider   Amador Valverde MD    Department, Room/Bed   [address], P387/1       Discharge Date       Discharge Disposition       Discharge Destination                                 Attending Provider: Amador Valverde MD    Allergies: Ace Inhibitors, Losartan, Seroquel [Quetiapine], Xanax [Alprazolam], Amlodipine, Ativan [Lorazepam], Baclofen, Minoxidil, Tetracycline    Isolation: None   Infection: None   Code Status: CPR    Ht: 175.3 cm (69.02\")   Wt: 86.9 kg (191 lb 9.3 oz)    Admission Cmt: None   Principal Problem: Gluteal abscess [L02.31]                   Active Insurance as of 10/3/2023       Primary Coverage       Payor Plan Insurance Group Employer/Plan Group    [facility] MEDICARE REPLACEMENT UNITED [facility] MEDICARE REPLACEMENT [id number]       Payor Plan Address Payor Plan Phone Number Payor Plan Fax Number Effective Dates    [address] [address]   1/1/2015 - None Entered    [facility] [id number]         Subscriber Name Subscriber Birth Date Member ID       DILIP REILLY 1950 [id number]                     Emergency Contacts        (Rel.) Home Phone Work Phone Mobile Phone    Beny Reilly (Spouse) [phone] -- [phone]    TOMMIESILVIA (Son) [phone] -- [phone]                "

## 2023-10-07 ENCOUNTER — READMISSION MANAGEMENT (OUTPATIENT)
Dept: CALL CENTER | Facility: HOSPITAL | Age: 73
End: 2023-10-07
Payer: MEDICARE

## 2023-10-07 VITALS
HEIGHT: 69 IN | BODY MASS INDEX: 28.02 KG/M2 | HEART RATE: 51 BPM | TEMPERATURE: 98.2 F | OXYGEN SATURATION: 100 % | SYSTOLIC BLOOD PRESSURE: 166 MMHG | WEIGHT: 189.2 LBS | DIASTOLIC BLOOD PRESSURE: 83 MMHG | RESPIRATION RATE: 16 BRPM

## 2023-10-07 PROBLEM — L03.90 CELLULITIS: Status: RESOLVED | Noted: 2023-10-03 | Resolved: 2023-10-07

## 2023-10-07 PROBLEM — L02.31 GLUTEAL ABSCESS: Status: RESOLVED | Noted: 2023-10-03 | Resolved: 2023-10-07

## 2023-10-07 PROBLEM — I50.32 CHRONIC HEART FAILURE WITH PRESERVED EJECTION FRACTION (HFPEF): Status: ACTIVE | Noted: 2023-10-07

## 2023-10-07 LAB
ALBUMIN SERPL-MCNC: 3.2 G/DL (ref 3.5–5.2)
ALBUMIN/GLOB SERPL: 0.9 G/DL
ALP SERPL-CCNC: 62 U/L (ref 39–117)
ALT SERPL W P-5'-P-CCNC: 42 U/L (ref 1–41)
ANION GAP SERPL CALCULATED.3IONS-SCNC: 9 MMOL/L (ref 5–15)
AST SERPL-CCNC: 26 U/L (ref 1–40)
BACTERIA SPEC AEROBE CULT: ABNORMAL
BACTERIA SPEC ANAEROBE CULT: ABNORMAL
BILIRUB SERPL-MCNC: 0.4 MG/DL (ref 0–1.2)
BUN SERPL-MCNC: 16 MG/DL (ref 8–23)
BUN/CREAT SERPL: 13.8 (ref 7–25)
CALCIUM SPEC-SCNC: 9.1 MG/DL (ref 8.6–10.5)
CHLORIDE SERPL-SCNC: 98 MMOL/L (ref 98–107)
CO2 SERPL-SCNC: 32 MMOL/L (ref 22–29)
CREAT SERPL-MCNC: 1.16 MG/DL (ref 0.76–1.27)
DEPRECATED RDW RBC AUTO: 41.8 FL (ref 37–54)
EGFRCR SERPLBLD CKD-EPI 2021: 66.1 ML/MIN/1.73
ERYTHROCYTE [DISTWIDTH] IN BLOOD BY AUTOMATED COUNT: 12.1 % (ref 12.3–15.4)
GLOBULIN UR ELPH-MCNC: 3.4 GM/DL
GLUCOSE BLDC GLUCOMTR-MCNC: 231 MG/DL (ref 70–130)
GLUCOSE BLDC GLUCOMTR-MCNC: 291 MG/DL (ref 70–130)
GLUCOSE SERPL-MCNC: 316 MG/DL (ref 65–99)
GRAM STN SPEC: ABNORMAL
HCT VFR BLD AUTO: 43.7 % (ref 37.5–51)
HGB BLD-MCNC: 14.6 G/DL (ref 13–17.7)
MAGNESIUM SERPL-MCNC: 1.7 MG/DL (ref 1.6–2.4)
MCH RBC QN AUTO: 31.4 PG (ref 26.6–33)
MCHC RBC AUTO-ENTMCNC: 33.4 G/DL (ref 31.5–35.7)
MCV RBC AUTO: 94 FL (ref 79–97)
PLATELET # BLD AUTO: 238 10*3/MM3 (ref 140–450)
PMV BLD AUTO: 10.3 FL (ref 6–12)
POTASSIUM SERPL-SCNC: 4.1 MMOL/L (ref 3.5–5.2)
PROT SERPL-MCNC: 6.6 G/DL (ref 6–8.5)
RBC # BLD AUTO: 4.65 10*6/MM3 (ref 4.14–5.8)
SODIUM SERPL-SCNC: 139 MMOL/L (ref 136–145)
WBC NRBC COR # BLD: 8.3 10*3/MM3 (ref 3.4–10.8)

## 2023-10-07 PROCEDURE — 85027 COMPLETE CBC AUTOMATED: CPT | Performed by: HOSPITALIST

## 2023-10-07 PROCEDURE — 63710000001 INSULIN LISPRO (HUMAN) PER 5 UNITS: Performed by: SURGERY

## 2023-10-07 PROCEDURE — 82948 REAGENT STRIP/BLOOD GLUCOSE: CPT

## 2023-10-07 PROCEDURE — G0378 HOSPITAL OBSERVATION PER HR: HCPCS

## 2023-10-07 PROCEDURE — 80053 COMPREHEN METABOLIC PANEL: CPT | Performed by: SURGERY

## 2023-10-07 PROCEDURE — 83735 ASSAY OF MAGNESIUM: CPT | Performed by: HOSPITALIST

## 2023-10-07 PROCEDURE — 25010000002 MORPHINE PER 10 MG: Performed by: HOSPITALIST

## 2023-10-07 RX ORDER — TAMSULOSIN HYDROCHLORIDE 0.4 MG/1
0.4 CAPSULE ORAL DAILY
Qty: 30 CAPSULE | Refills: 0 | Status: SHIPPED | OUTPATIENT
Start: 2023-10-07

## 2023-10-07 RX ORDER — LIDOCAINE HYDROCHLORIDE 20 MG/ML
JELLY TOPICAL AS NEEDED
Status: DISCONTINUED | OUTPATIENT
Start: 2023-10-07 | End: 2023-10-07 | Stop reason: HOSPADM

## 2023-10-07 RX ORDER — AMOXICILLIN AND CLAVULANATE POTASSIUM 875; 125 MG/1; MG/1
1 TABLET, FILM COATED ORAL EVERY 12 HOURS SCHEDULED
Qty: 3 TABLET | Refills: 0 | Status: SHIPPED | OUTPATIENT
Start: 2023-10-07 | End: 2023-10-09

## 2023-10-07 RX ORDER — OXYCODONE HYDROCHLORIDE AND ACETAMINOPHEN 5; 325 MG/1; MG/1
1 TABLET ORAL EVERY 4 HOURS PRN
Qty: 15 TABLET | Refills: 0 | Status: SHIPPED | OUTPATIENT
Start: 2023-10-07 | End: 2023-10-19

## 2023-10-07 RX ADMIN — SENNOSIDES AND DOCUSATE SODIUM 2 TABLET: 50; 8.6 TABLET ORAL at 08:49

## 2023-10-07 RX ADMIN — INSULIN LISPRO 10 UNITS: 100 INJECTION, SOLUTION INTRAVENOUS; SUBCUTANEOUS at 08:47

## 2023-10-07 RX ADMIN — Medication 250 MG: at 08:48

## 2023-10-07 RX ADMIN — AMOXICILLIN AND CLAVULANATE POTASSIUM 1 TABLET: 875; 125 TABLET, FILM COATED ORAL at 08:48

## 2023-10-07 RX ADMIN — BRIMONIDINE TARTRATE 1 DROP: 2 SOLUTION OPHTHALMIC at 08:48

## 2023-10-07 RX ADMIN — Medication 10 ML: at 08:49

## 2023-10-07 RX ADMIN — ASPIRIN 81 MG: 81 TABLET, COATED ORAL at 08:48

## 2023-10-07 RX ADMIN — HYDROCODONE BITARTRATE AND ACETAMINOPHEN 1 TABLET: 7.5; 325 TABLET ORAL at 02:36

## 2023-10-07 RX ADMIN — MORPHINE SULFATE 1 MG: 2 INJECTION, SOLUTION INTRAMUSCULAR; INTRAVENOUS at 06:25

## 2023-10-07 RX ADMIN — LIDOCAINE HYDROCHLORIDE: 20 JELLY TOPICAL at 13:00

## 2023-10-07 RX ADMIN — TAMSULOSIN HYDROCHLORIDE 0.4 MG: 0.4 CAPSULE ORAL at 08:48

## 2023-10-07 RX ADMIN — HYDROCODONE BITARTRATE AND ACETAMINOPHEN 1 TABLET: 7.5; 325 TABLET ORAL at 12:35

## 2023-10-07 RX ADMIN — INSULIN LISPRO 7 UNITS: 100 INJECTION, SOLUTION INTRAVENOUS; SUBCUTANEOUS at 12:35

## 2023-10-07 RX ADMIN — DOCUSATE SODIUM 100 MG: 100 CAPSULE, LIQUID FILLED ORAL at 08:49

## 2023-10-07 RX ADMIN — Medication 27 MG: at 08:48

## 2023-10-07 RX ADMIN — BUMETANIDE 0.5 MG: 0.5 TABLET ORAL at 08:48

## 2023-10-07 NOTE — OUTREACH NOTE
Prep Survey      Flowsheet Row Responses   [facility] facility patient discharged from? [place]   Is LACE score < 7 ? No   Eligibility Readm Mgmt   Discharge diagnosis Chronic heart failure with preserved ejection fraction (HFpEF) (I50.32)   Does the patient have one of the following disease processes/diagnoses(primary or secondary)? CHF   Does the patient have Home health ordered? Yes   What is the Home health agency?  [facility]   Is there a DME ordered? No   Prep survey completed? Yes            JUSTIN MOSLEY - Registered Nurse

## 2023-10-07 NOTE — DISCHARGE SUMMARY
Patient Name: Dilip Verma  : 1950  MRN: [id number]    Date of Admission: 10/3/2023  Date of Discharge:  10/7/2023  Primary Care Physician: Tena Taylor MD      Chief Complaint:   Abscess (/) and Urinary Retention (/)      Discharge Diagnoses     Active Hospital Problems    Diagnosis  POA   • Chronic heart failure with preserved ejection fraction (HFpEF) [I50.32]  Yes   • Cervical stenosis of spine [M48.02]  Yes   • Spinal stenosis, lumbar region, without neurogenic claudication [M48.061]  Yes   • Medically noncompliant [Z91.199]  Not Applicable   • Type 2 diabetes mellitus with diabetic neuropathy, with long-term current use of insulin [E11.40, Z79.4]  Not Applicable   • Urinary retention [R33.9]  Yes   • CKD (chronic kidney disease) stage 3, GFR 30-59 ml/min [N18.30]  Yes   • History of stroke [Z86.73]  Not Applicable   • HTN (hypertension) [I10]  Yes   • CSA (central sleep apnea) [G47.31]  Yes   • Chronic diastolic heart failure [I50.32]  Yes   • Hx of CABG [Z95.1]  Not Applicable   • CAD (coronary artery disease) [I25.10]  Yes      Resolved Hospital Problems    Diagnosis Date Resolved POA   • **Gluteal abscess [L02.31] 10/07/2023 Yes   • Cellulitis [L03.90] 10/07/2023 Yes        Hospital Course     Mr. Verma is a 73 y.o. male with a history of chronic cervical and lumbar spinal stenosis with foot drop, peripheral neuropathy, diastolic CHF, HTN, CAD, CKD 3, DM 2 and stroke who presented to [facility] initially complaining of pain in the buttock related to gluteal cleft abscess.  He was also having urine retention which is somewhat of an acute on chronic problem, previously requiring long-term Hopkins but not currently admitted with 1.  Please see the admitting history and physical for further details.  He had already been on antibiotics for the gluteal cleft abscess but was not feeling much better.  He was seen in consultation by colorectal surgery who noted that he did have  perianal and buttock abscess with recommendation for I&D.  He was started on appropriate antibiotics intravenously at first and then eventually transitioned to oral.  He has been cleared for discharge from surgical standpoint and only needs a few more doses of antibiotics as detailed below.  MRI of the spine was obtained which did not show any worsening from prior MRI 3 months ago and neurosurgery did not recommend any additional management from their standpoint.  They plan to follow-up in 4 months.  He was treated with Hopkins catheter per urology and this was removed for voiding trial yesterday but unfortunately he has still been unable to void appropriately so he is going to have to have the Hopkins replaced and follow-up with urology in the outpatient setting.  He is still pretty weak but does not want SNF placement.  He and his wife have quite a bit of medical equipment already at home and they feel that he is safe there.  They are agreeable to home health.    Day of Discharge     Subjective:  No new complaints.  Stable pain.  He was in and out catheterized overnight    Physical Exam:  Temp:  [97.7 °F (36.5 °C)-98.2 °F (36.8 °C)] 98.2 °F (36.8 °C)  Heart Rate:  [51-79] 51  Resp:  [16-18] 16  BP: (117-171)/(56-91) 166/83  Body mass index is 27.93 kg/m².  Physical Exam  Vitals reviewed.   Constitutional:       General: He is not in acute distress.     Appearance: He is obese. He is not ill-appearing.   Cardiovascular:      Rate and Rhythm: Normal rate and regular rhythm.   Pulmonary:      Effort: Pulmonary effort is normal. No respiratory distress.      Breath sounds: Normal breath sounds.   Abdominal:      General: There is no distension.      Palpations: Abdomen is soft.      Tenderness: There is no abdominal tenderness.   Musculoskeletal:      Right lower leg: No edema.      Left lower leg: No edema.   Skin:     General: Skin is warm and dry.   Neurological:      General: No focal deficit present.      Mental  Status: He is alert.   Psychiatric:         Mood and Affect: Mood normal.         Consultants     Consult Orders (all) (From admission, onward)       Start     Ordered    10/06/23 1357  Inpatient Case Management  Consult  Once        Provider:  (Not yet assigned)    10/06/23 1357    10/04/23 0702  Inpatient Urology Consult  IN AM        Specialty:  Urology  Provider:  Lasha Velasco MD    10/03/23 2205    10/03/23 1752  Inpatient Consult to Case Management   Once        Provider:  (Not yet assigned)    10/03/23 1757    10/03/23 1550  Inpatient General Surgery Consult  Once        Specialty:  General Surgery  Provider:  Herbie Villatoro MD    10/03/23 1549    10/03/23 1532  Inpatient Neurosurgery Consult  Once        Specialty:  Neurosurgery  Provider:  Andre Woodson MD    10/03/23 1531    10/03/23 1347  [facility] (on-call MD unless specified) Details  Once        Specialty:  Hospitalist  Provider:  (Not yet assigned)    10/03/23 1346                  Procedures     Incision and drainage of perianal and buttock abscess    Imaging Results (All)       Procedure Component Value Units Date/Time    MRI Lumbar Spine With & Without Contrast [[id number]] Collected: 10/06/23 0135     Updated: 10/06/23 0147    Narrative:      LUMBAR SPINE MRI WITHOUT AND WITH GADOLINIUM     HISTORY: urinary retention-history of lumbar spinal stenosis;  R33.8-Other retention of urine; M48.00-Spinal stenosis, site  unspecified; L02.31-Cutaneous abscess of buttock     COMPARISON: July 11, 2020..     FINDINGS:  Multiplanar images of the lumbar spine obtained without and  with gadolinium.  Axial images obtained from T12-S1.     No acute fracture or subluxation of the lumbar spine is seen.  Retrolisthesis of L1 and L2 and L2 on L3 is stable. There is also some  retrolisthesis of L3 on L4. Intervertebral disc space narrowing is noted  at multiple levels. Conus terminates at L1-L2. There appears to be  some  increasing marrow edema at L2, although it is a relatively mild  increase. Marrow edema at L3 appears to be stable. It is associated with  some enhancement. Both of these are favored to be secondary to chronic  degenerative endplate change. Degenerative changes do appear to be  stable when compared to the exam from July 11, 2023. Please see that  report for full description of the degenerative findings.       Impression:      1. Stable degenerative findings when compared to July 11, 2023. Please  see that report for full description of degenerative findings.           This report was finalized on 10/6/2023 1:44 AM by Dr. Gayle Okeefe M.D on Workstation: [id number]       MRI Cervical Spine With & Without Contrast [[id number]] Collected: 10/06/23 0112     Updated: 10/06/23 0128    Narrative:      CERVICAL SPINE MRI WITHOUT AND WITH GADOLINIUM     HISTORY: urinary retention-history of cervical spinal stenosis;  R33.8-Other retention of urine; M48.00-Spinal stenosis, site  unspecified; L02.31-Cutaneous abscess of buttock     COMPARISON: July 11, 2023.     FINDINGS:  Multiplanar images of the cervical spine obtained without and  with gadolinium.  Axial images obtained from C2-T1.     No acute fracture or subluxation of the cervical spine is seen.  Intervertebral disc space narrowing is noted at multiple levels. Patient  is again noted to have some marrow edema at C3-C4. This appears stable  when compared to prior study. This area does show some enhancement  following contrast administration. The edema has been felt to be  secondary to degenerative endplate marrow. Mild retrolisthesis of C3 on  C4 and C4 on C5 is stable. Prior study did describe some subtle cord  signal abnormality at the level of C3. This appears unchanged when  compared to prior exam. No new areas of cord signal abnormality are  identified. I don't see any convincing enhancement of the cord within  this area. Degenerative changes appear to  be stable when compared to the  exam from July 11, 2023. Please see that report for detailed description  of degenerative findings. There is potentially abnormal or absent signal  within the left vertebral artery. This is poorly assessed on this MRI of  the spine. CT angiography could be considered for further assessment,  particularly if the patient has any referable symptomatology.          Impression:      1. Stable findings when compared to July 11, 2023. Please see that  report for full description of degenerative findings.           This report was finalized on 10/6/2023 1:25 AM by Dr. aGyle Okeefe M.D on Workstation: [id number]       XR Chest 1 View [[id number]] Collected: 10/04/23 0729     Updated: 10/04/23 0733    Narrative:      XR CHEST 1 VW-     HISTORY:   Urinary retention, on antibiotics, missed Bumex, history of  CHF.     COMPARISON: Chest radiograph 6/4/2023     FINDINGS:    A single view of the chest was obtained. The cardiac silhouette is  normal in size. There are postsurgical changes from CABG. There is  central pulmonary venous congestion without overt pulmonary edema. There  are low lung volumes. There is no consolidation. There is no large  pleural effusion. Sternotomy wires are present. There is multilevel  degenerative disc disease.     This report was finalized on 10/4/2023 7:30 AM by Dr. Kelle Suh M.D  on Workstation: [id number]                 Pertinent Labs     Results from last 7 days   Lab Units 10/07/23  0734 10/06/23  0651 10/05/23  0648 10/04/23  0641   WBC 10*3/mm3 8.30 7.57 10.42 14.07*   HEMOGLOBIN g/dL 14.6 14.7 13.6 15.3   PLATELETS 10*3/mm3 238 249 259 300     Results from last 7 days   Lab Units 10/06/23  0651 10/05/23  1634 10/05/23  0648 10/04/23  0641 10/03/23  1245   SODIUM mmol/L 138  --  141 139 135*   POTASSIUM mmol/L 4.4 4.2 3.6 3.9 4.3   CHLORIDE mmol/L 102  --  101 98 92*   CO2 mmol/L 31.8*  --  33.8* 30.3* 28.5   BUN mg/dL 15  --  15 14 12   CREATININE  "mg/dL 1.04  --  1.25 0.92 0.98   GLUCOSE mg/dL 216*  --  201* 233* 263*   EGFR mL/min/1.73 75.3  --  60.4 87.3 80.9     Results from last 7 days   Lab Units 10/06/23  0651 10/05/23  0648 10/04/23  0641 10/03/23  1245   ALBUMIN g/dL 3.0* 2.8* 3.0* 3.2*   BILIRUBIN mg/dL 0.4 0.5 0.5 0.6   ALK PHOS U/L 56 53 69 73   AST (SGOT) U/L 20 20 15 11   ALT (SGPT) U/L 28 22 20 23     Results from last 7 days   Lab Units 10/06/23  0651 10/05/23  0648 10/04/23  0641 10/03/23  1814 10/03/23  1245   CALCIUM mg/dL 8.7 8.5* 9.0  --  9.3   ALBUMIN g/dL 3.0* 2.8* 3.0*  --  3.2*   MAGNESIUM mg/dL 1.9 1.9  --  1.9  --        Results from last 7 days   Lab Units 10/03/23  1814 10/03/23  1245   CK TOTAL U/L 71  --    PROBNP pg/mL  --  3,843.0*           Invalid input(s): \"LDLCALC\"  Results from last 7 days   Lab Units 10/04/23  1233   WOUNDCX  Scant growth (1+) Gram Positive Rods*         Test Results Pending at Discharge     Pending Labs       Order Current Status    Anaerobic Culture - Wound, Perirectal Abscess In process    Wound Culture - Wound, Perirectal Abscess Preliminary result            Discharge Details        Discharge Medications        New Medications        Instructions Start Date   amoxicillin-clavulanate 875-125 MG per tablet  Commonly known as: AUGMENTIN   1 tablet, Oral, Every 12 Hours Scheduled      oxyCODONE-acetaminophen 5-325 MG per tablet  Commonly known as: Percocet   1 tablet, Oral, Every 4 Hours PRN      tamsulosin 0.4 MG capsule 24 hr capsule  Commonly known as: FLOMAX   0.4 mg, Oral, Daily             Continue These Medications        Instructions Start Date   aspirin 81 MG EC tablet   81 mg, Oral, Daily      brimonidine 0.2 % ophthalmic solution  Commonly known as: ALPHAGAN   1 drop, Both Eyes, 2 Times Daily      bumetanide 1 MG tablet  Commonly known as: BUMEX   0.5 mg, Oral, 2 Times Daily, Holding Since 9/30/23      Docusate Sodium 100 MG capsule   100 mg, Oral, Daily      Insulin Glargine (2 Unit Dial) 300 " UNIT/ML solution pen-injector injection  Commonly known as: TOUJEO   30 Units, Subcutaneous, Daily      insulin lispro 100 UNIT/ML injection  Commonly known as: humaLOG   0-14 Units, Subcutaneous, 3 Times Daily Before Meals      latanoprost 0.005 % ophthalmic solution  Commonly known as: XALATAN   1 drop, Both Eyes, Every Night at Bedtime      MAGNESIUM PO   1 tablet, Oral, Daily      mometasone 0.1 % ointment  Commonly known as: ELOCON   1 application , Topical, Daily, Do not exceed 1 week, needs to be off 1 week and can restart      testosterone 25 MG/2.5GM (1%) gel gel  Commonly known as: ANDROGEL   25 mg, Transdermal, Daily      Unable to find   1 each, Topical, Once, Cbd pain stick      Vitamin D-3 125 MCG (5000 UT) tablet   1 tablet, Oral, Every 24 Hours             Stop These Medications      sildenafil 20 MG tablet  Commonly known as: REVATIO     traMADol 50 MG tablet  Commonly known as: ULTRAM              Allergies   Allergen Reactions   • Ace Inhibitors Angioedema   • Losartan Angioedema     Angioneurotic edema   • Seroquel [Quetiapine] Hallucinations   • Xanax [Alprazolam] Unknown - High Severity   • Amlodipine Swelling   • Ativan [Lorazepam] Hallucinations   • Baclofen Hallucinations   • Minoxidil Confusion   • Tetracycline Rash     bliisters in mouth         Discharge Disposition:  Home-Health Care [facility]      Discharge Diet:  Diet Order   Procedures   • Diet: Renal Diets; Low Potassium, Low Phosphorus; Texture: Regular Texture (IDDSI 7); Fluid Consistency: Thin (IDDSI 0)       Discharge Activity:       CODE STATUS:    Code Status and Medical Interventions:   Ordered at: 10/03/23 0013     Code Status (Patient has no pulse and is not breathing):    CPR (Attempt to Resuscitate)     Medical Interventions (Patient has pulse or is breathing):    Full       Future Appointments   Date Time Provider Department Center   10/19/2023 11:45 AM Herbie Villatoro MD [facility]   1/23/2024 12:30 PM Amy  Amador Krishnan MD [facility]      Follow-up Information       Tena Taylor MD Follow up in 1 week(s).    Specialty: Family Medicine  Contact information:  [address] [place] [address]  [phone]                             Time Spent on Discharge:  Greater than 30 minutes      Darci New MD  [facility]  10/07/23  08:51 EDT

## 2023-10-07 NOTE — CASE MANAGEMENT/SOCIAL WORK
Case Management Discharge Note      Final Note: patient discharging home with [facility]. CCP confirmed with [facility], able to follow. and has HH orders RN updated. Luisito MONTES RN CCP    Provided Post Acute Provider List?: Yes  Post Acute Provider List: Home Health  Provided Post Acute Provider Quality & Resource List?: Yes  Post Acute Provider Quality and Resource List: Home Health  Delivered To: Patient  Method of Delivery: In person    Selected Continued Care - Admitted Since 10/3/2023       Destination    No services have been selected for the patient.                Durable Medical Equipment    No services have been selected for the patient.                Dialysis/Infusion    No services have been selected for the patient.                Home Medical Care Coordination complete.      Service Provider Selected Services Address Phone Fax Patient Preferred    [facility] ,  Home Nursing [address] [place] [address], [address] [id number] [phone] --              Therapy    No services have been selected for the patient.                Community Resources    No services have been selected for the patient.                Community & DME    No services have been selected for the patient.                    Transportation Services  Private: Car    Final Discharge Disposition Code: 06 - home with home health care

## 2023-10-08 ENCOUNTER — NURSE TRIAGE (OUTPATIENT)
Dept: CALL CENTER | Facility: HOSPITAL | Age: 73
End: 2023-10-08
Payer: MEDICARE

## 2023-10-08 NOTE — TELEPHONE ENCOUNTER
"Bartolo is concerened about her . She said that he is having pain in his rib cage. It is on the right side. It feels like it is dry or someone is pinching him, like a \"dry socket.\"   His heart rate 149/88 HR was 80.   She was wanting to know when home health would come and change his dressing. She wanted to know if he should take tramadol even though the MD said to discontinue. The patient was in the background and said he wanted morphine instead.   While on the call the patient and his wife were jumping around from topic to topic quickly and I had to keep reminding them to focus on one problem at a time. The patient said that he had a light catheter placed for urinary retention, but his wife said that she has emptied 1100 mL from it tonight.   I asked about the rib cage pain again and he said that it really isn't pain, but a queasy feeling. He said it has gone away. He was singing in th background. He said that he wanted morphine but I reminded him that was no prescribed for home and he is not reporting any pain.   The wife asked about the dressing change and I looked through the patient's chart and AVS and did not see specific dressing care instructions other than the generalized instructions that it should be okay for 2-3 days. She asked about wet to dry dressings, like they did at the hospital. I advised her to call the surgeon after 8 AM today to get more specific care instructions. I also advised her to call back the the home health agency after 8 AM to see if they have  accepted him yet. In the chart it says that he has been accepted, but she was told he was pending. She said she will follow this advice.     Reason for Disposition   [1] Chest pain lasts < 5 minutes AND [2] NO chest pain or cardiac symptoms (e.g., breathing difficulty, sweating) now  (Exception: Chest pains that last only a few seconds.)   Unexplained nausea    Additional Information   Negative: SEVERE difficulty breathing (e.g., " "struggling for each breath, speaks in single words)   Negative: Difficult to awaken or acting confused (e.g., disoriented, slurred speech)   Negative: Shock suspected (e.g., cold/pale/clammy skin, too weak to stand, low BP, rapid pulse)   Negative: Passed out (i.e., lost consciousness, collapsed and was not responding)   Negative: [1] Chest pain lasts > 5 minutes AND [2] age > 44   Negative: [1] Chest pain lasts > 5 minutes AND [2] age > 30 AND [3] one or more cardiac risk factors (e.g., diabetes, high blood pressure, high cholesterol, smoker, or strong family history of heart disease)   Negative: [1] Chest pain lasts > 5 minutes AND [2] history of heart disease (i.e., angina, heart attack, heart failure, bypass surgery, takes nitroglycerin)   Negative: [1] Chest pain lasts > 5 minutes AND [2] described as crushing, pressure-like, or heavy   Negative: Heart beating < 50 beats per minute OR > 140 beats per minute   Negative: Visible sweat on face or sweat dripping down face   Negative: Sounds like a life-threatening emergency to the triager   Negative: Followed a chest injury   Negative: SEVERE chest pain   Negative: [1] Chest pain (or \"angina\") comes and goes AND [2] is happening more often (increasing in frequency) or getting worse (increasing in severity)  (Exception: Chest pains that last only a few seconds.)   Negative: Pain also in shoulder(s) or arm(s) or jaw  (Exception: Pain is clearly made worse by movement.)   Negative: Difficulty breathing   Negative: Dizziness or lightheadedness   Negative: Coughing up blood   Negative: Cocaine use within last 3 days   Negative: Major surgery in past month   Negative: Hip or leg fracture (broken bone) in past month (or had cast on leg or ankle in past month)   Negative: Illness requiring prolonged bedrest in past month (e.g., immobilization, long hospital stay)   Negative: Long-distance travel in past month (e.g., car, bus, train, plane; with trip lasting 6 or more " "hours)   Negative: History of prior \"blood clot\" in leg or lungs (i.e., deep vein thrombosis, pulmonary embolism)   Negative: History of inherited increased risk of blood clots (e.g., Factor 5 Leiden, Anti-thrombin 3, Protein C or Protein S deficiency, Prothrombin mutation)   Negative: Cancer treatment in past six months (or has cancer now)   Negative: [1] Chest pain lasts > 5 minutes AND [2] occurred in past 3 days (72 hours) (Exception: Feels exactly the same as previously diagnosed heartburn and has accompanying sour taste in mouth.)   Negative: Taking a deep breath makes pain worse   Negative: Patient sounds very sick or weak to the triager   Negative: [1] Chest pain lasts > 5 minutes AND [2] occurred > 3 days ago (72 hours) AND [3] NO chest pain or cardiac symptoms now   Negative: Shock suspected (e.g., cold/pale/clammy skin, too weak to stand, low BP, rapid pulse)   Negative: Sounds like a life-threatening emergency to the triager   Negative: [1] Nausea or vomiting AND [2] pregnancy < 20 weeks   Negative: Menstrual Period - Missed or Late (i.e., pregnancy suspected)   Negative: Heat exhaustion suspected (i.e., dehydration from heat exposure)   Negative: Motion sickness suspected (i.e., nausea with car, plane, boat, or train travel)   Negative: Anxiety or stress suspected (i.e., nausea with anxiety attacks or stressful situations)   Negative: Traumatic Brain Injury (TBI) suspected   Negative: Nausea (or Vomiting) in a cancer patient who is currently (or recently) receiving chemotherapy or radiation therapy, or cancer patient who has metastatic or end-stage cancer and is receiving palliative care   Negative: Vomiting occurs   Negative: Other symptom is present, see that guideline (e.g., chest pain, headache, dizziness, abdominal pain, colds, sore throat, etc.).   Negative: Unable to walk, or can only walk with assistance (e.g., requires support)   Negative: Difficulty breathing   Negative: [1] Insulin-dependent " "diabetes (Type I) AND [2] glucose > 400 mg/dl (22 mmol/l)   Negative: [1] Drinking very little AND [2] dehydration suspected (e.g., no urine > 12 hours, very dry mouth, very lightheaded)   Negative: Patient sounds very sick or weak to the triager   Negative: Fever > 104 F (40 C)   Negative: [1] Fever > 101 F (38.3 C) AND [2] age > 60 years   Negative: [1] Fever > 100.0 F (37.8 C) AND [2] bedridden (e.g., CVA, chronic illness, recovering from surgery)   Negative: [1] Fever > 100.0 F (37.8 C) AND [2] diabetes mellitus or weak immune system (e.g., HIV positive, cancer chemo, splenectomy, organ transplant, chronic steroids)   Negative: Taking any of the following medications: digoxin (Lanoxin), lithium, theophylline, phenytoin (Dilantin)   Negative: Yellowish color of the skin or white of the eye (i.e., jaundice)   Negative: Fever present > 3 days (72 hours)   Negative: Receiving cancer chemotherapy medication   Negative: Taking prescription medication that could cause nausea (e.g., narcotics/opiates, antibiotics, OCPs, many others)   Negative: Nausea lasts > 1 week   Negative: Substance use (drug use) or unhealthy alcohol use, known or suspected   Negative: Nausea is a chronic symptom (recurrent or ongoing AND present > 4 weeks)    Answer Assessment - Initial Assessment Questions  1. LOCATION: \"Where does it hurt?\"        Right rib cage   2. RADIATION: \"Does the pain go anywhere else?\" (e.g., into neck, jaw, arms, back)      Maybe his back some   3. ONSET: \"When did the chest pain begin?\" (Minutes, hours or days)      Hour ago   4. PATTERN: \"Does the pain come and go, or has it been constant since it started?\"  \"Does it get worse with exertion?\"      Constant   5. DURATION: \"How long does it last\" (e.g., seconds, minutes, hours)  1 hour   6. SEVERITY: \"How bad is the pain?\"  (e.g., Scale 1-10; mild, moderate, or severe)     - MILD (1-3): doesn't interfere with normal activities      - MODERATE (4-7): interferes with " "normal activities or awakens from sleep     - SEVERE (8-10): excruciating pain, unable to do any normal activities        Mild   7. CARDIAC RISK FACTORS: \"Do you have any history of heart problems or risk factors for heart disease?\" (e.g., angina, prior heart attack; diabetes, high blood pressure, high cholesterol, smoker, or strong family history of heart disease)      Congested heart failure and triple bypass surgery   8. PULMONARY RISK FACTORS: \"Do you have any history of lung disease?\"  (e.g., blood clots in lung, asthma, emphysema, birth control pills)      no  9. CAUSE: \"What do you think is causing the chest pain?\"      Not sure   10. OTHER SYMPTOMS: \"Do you have any other symptoms?\" (e.g., dizziness, nausea, vomiting, sweating, fever, difficulty breathing, cough)       Nausea   11. PREGNANCY: \"Is there any chance you are pregnant?\" \"When was your last menstrual period?\"     NA    Answer Assessment - Initial Assessment Questions  1. NAUSEA SEVERITY: \"How bad is the nausea?\" (e.g., mild, moderate, severe; dehydration, weight loss)    - MILD: loss of appetite without change in eating habits    - MODERATE: decreased oral intake without significant weight loss, dehydration, or malnutrition    - SEVERE: inadequate caloric or fluid intake, significant weight loss, symptoms of dehydration      Mild   2. ONSET: \"When did the nausea begin?\"    Nausea   3. VOMITING: \"Any vomiting?\" If Yes, ask: \"How many times today?\"      No   4. RECURRENT SYMPTOM: \"Have you had nausea before?\" If Yes, ask: \"When was the last time?\" \"What happened that time?\"      No   5. CAUSE: \"What do you think is causing the nausea?\"    Not sure   6. PREGNANCY: \"Is there any chance you are pregnant?\" (e.g., unprotected intercourse, missed birth control pill, broken condom)      NA    Protocols used: Chest Pain-ADULT-AH, Nausea-ADULT-AH    "

## 2023-10-09 ENCOUNTER — READMISSION MANAGEMENT (OUTPATIENT)
Dept: CALL CENTER | Facility: HOSPITAL | Age: 73
End: 2023-10-09
Payer: MEDICARE

## 2023-10-09 NOTE — OUTREACH NOTE
CHF Week 1 Survey      Flowsheet Row Responses   [facility] patient discharged from? [place]   Does the patient have one of the following disease processes/diagnoses(primary or secondary)? CHF   CHF Week 1 attempt successful? Yes   Call start time 1255   Call end time 1318   Discharge diagnosis Chronic heart failure with preserved ejection fraction (HFpEF) (I50.32)   Person spoke with today (if not patient) and relationship Beny Spouse   Meds reviewed with patient/caregiver? Yes   Is the patient having any side effects they believe may be caused by any medication additions or changes? No   Does the patient have all medications ordered at discharge? Yes   Is the patient taking all medications as directed (includes completed medication regime)? Yes   Does the patient have a primary care provider?  Yes   Does the patient have an appointment with their PCP within 7 days of discharge? No   Comments regarding PCP Dr Taylor.   What is preventing the patient from scheduling follow up appointments within 7 days of discharge? Haven't had time   Nursing Interventions Educated patient on importance of making appointment, Advised patient to make appointment   Has the patient kept scheduled appointments due by today? N/A   Comments Patient Has  urology, cardiology,  PCP and  nurse. Declines to schedule appt with Heart Failure Clinic. (Explained the benefit of assistance with managing with heart failure). Wife reports at this time, not necessary. His issues for this admission are with pain, gluteal abcess, and urine retention.   [facility]   Has home health visited the patient within 72 hours of discharge? Yes   Psychosocial issues? No   Did the patient receive a copy of their discharge instructions? Yes   Nursing interventions Reviewed instructions with patient   What is the patient's perception of their health status since discharge? Improving   Nursing interventions  Nurse provided patient education   Is the patient able to teach back signs and symptoms of worsening condition? (i.e. weight gain, shortness of air, etc.) Yes   If the patient is a current smoker, are they able to teach back resources for cessation? Not a smoker   Is the patient/caregiver able to teach back the hierarchy of who to call/visit for symptoms/problems? PCP, Specialist, Home health nurse, Urgent Care, ED, 911 Yes   Is the patient able to teach back Heart Failure Zones? Yes   CHF Nursing Interventions Education provided on various zones   CHF Zone this Call Green Zone   Green Zone Patient reports doing well, No new or worsening shortness of breath    CHF Week 1 call completed? Yes   Revoked No further contact(revokes)-requires comment   Is the patient interested in additional calls from an ambulatory ? No   Would this patient benefit from a Referral to Amb Social Work? No   Wrap up additional comments Patient is improving. Wife is keeping dressing changed on gluteal are. HH has been to home. No needs at this time.   Call end time 1318            Jose Maria ANDERSON - Registered Nurse

## 2023-10-09 NOTE — OUTREACH NOTE
CHF Week 1 Survey      Flowsheet Row Responses   [facility] patient discharged from? [place]   Does the patient have one of the following disease processes/diagnoses(primary or secondary)? CHF   CHF Week 1 attempt successful? Yes   Call start time 1255   Call end time 1318   Discharge diagnosis Chronic heart failure with preserved ejection fraction (HFpEF) (I50.32)   Person spoke with today (if not patient) and relationship Beny Spouse   Meds reviewed with patient/caregiver? Yes   Is the patient having any side effects they believe may be caused by any medication additions or changes? No   Does the patient have all medications ordered at discharge? Yes   Is the patient taking all medications as directed (includes completed medication regime)? Yes   Does the patient have a primary care provider?  Yes   Does the patient have an appointment with their PCP within 7 days of discharge? No   Comments regarding PCP Dr Taylor.   What is preventing the patient from scheduling follow up appointments within 7 days of discharge? Haven't had time   Nursing Interventions Educated patient on importance of making appointment, Advised patient to make appointment   Has the patient kept scheduled appointments due by today? N/A   Comments Patient Has  urology, cardiology,  PCP and  nurse. Declines to schedule appt with Heart Failure Clinic. (Explained the benefit of assistance with managing with heart failure). Wife reports at this time, not necessary. His issues for this admission are with pain, and urology.   [facility]   Has home health visited the patient within 72 hours of discharge? Yes   Psychosocial issues? No   Did the patient receive a copy of their discharge instructions? Yes   Nursing interventions Reviewed instructions with patient   What is the patient's perception of their health status since discharge? Improving   Nursing interventions Nurse provided patient  education   Is the patient able to teach back signs and symptoms of worsening condition? (i.e. weight gain, shortness of air, etc.) Yes   If the patient is a current smoker, are they able to teach back resources for cessation? Not a smoker   Is the patient/caregiver able to teach back the hierarchy of who to call/visit for symptoms/problems? PCP, Specialist, Home health nurse, Urgent Care, ED, 911 Yes   Is the patient able to teach back Heart Failure Zones? Yes   CHF Nursing Interventions Education provided on various zones   CHF Zone this Call Green Zone   Green Zone Patient reports doing well, No new or worsening shortness of breath    CHF Week 1 call completed? Yes   Revoked No further contact(revokes)-requires comment   Is the patient interested in additional calls from an ambulatory ? No   Would this patient benefit from a Referral to Amb Social Work? No   Wrap up additional comments Patient is improving. Wife is keeping dressing changed on gluteal are. HH has been to home. No needs at this time.   Call end time 1318            Jose Maria ANDERSON - Registered Nurse

## 2023-10-19 ENCOUNTER — OFFICE VISIT (OUTPATIENT)
Dept: SURGERY | Facility: CLINIC | Age: 73
End: 2023-10-19
Payer: MEDICARE

## 2023-10-19 VITALS
WEIGHT: 184.4 LBS | DIASTOLIC BLOOD PRESSURE: 80 MMHG | HEIGHT: 69 IN | BODY MASS INDEX: 27.31 KG/M2 | SYSTOLIC BLOOD PRESSURE: 118 MMHG

## 2023-10-19 DIAGNOSIS — K61.0 PERIANAL ABSCESS: Primary | ICD-10-CM

## 2023-10-19 PROCEDURE — 99024 POSTOP FOLLOW-UP VISIT: CPT | Performed by: SURGERY

## 2023-10-19 PROCEDURE — 3079F DIAST BP 80-89 MM HG: CPT | Performed by: SURGERY

## 2023-10-19 PROCEDURE — 1159F MED LIST DOCD IN RCRD: CPT | Performed by: SURGERY

## 2023-10-19 PROCEDURE — 3074F SYST BP LT 130 MM HG: CPT | Performed by: SURGERY

## 2023-10-19 PROCEDURE — 1160F RVW MEDS BY RX/DR IN RCRD: CPT | Performed by: SURGERY

## 2023-10-19 RX ORDER — TESTOSTERONE 12.5 MG/1.25G
GEL TOPICAL
COMMUNITY
Start: 2023-10-09

## 2023-10-19 RX ORDER — DOCUSATE SODIUM 50 MG/5ML
LIQUID ORAL
COMMUNITY
Start: 2023-10-09

## 2023-10-19 RX ORDER — TAMSULOSIN HYDROCHLORIDE 0.4 MG/1
CAPSULE ORAL
COMMUNITY
Start: 2023-10-09

## 2023-10-19 RX ORDER — NITROFURANTOIN 25; 75 MG/1; MG/1
CAPSULE ORAL
COMMUNITY
Start: 2023-10-16

## 2023-10-19 RX ORDER — BUMETANIDE 1 MG/1
TABLET ORAL
COMMUNITY
Start: 2023-10-09

## 2023-10-19 RX ORDER — ASPIRIN 81 MG/1
TABLET, CHEWABLE ORAL
COMMUNITY
Start: 2023-10-09

## 2023-10-19 NOTE — PROGRESS NOTES
Colorectal & General Surgery  Post - Op    Patient: Dilip Verma  YOB: 1950  MRN: [id number]      Assessment  Dilip Verma is a 73 y.o. male with recent perianal abscess who presents to the office for follow-up today.  He has recovered quite well.  Wound looks great. He is welcome to follow-up with me anytime as needed.    History of Present Illness   Dilip Verma is a 73 y.o. male status post recent incision and drainage of perianal and buttock abscess  who presents in follow-up today.  He is recovered quite well.  He denies any pain.  Minimal drainage.  Denies fevers and chills.    Vital Signs  There were no vitals filed for this visit.     Physical Exam  Constitutional: Resting comfortably, no acute distress  Neck: Supple, trachea midline  Respiratory: No increased work of breathing, Symmetric excursion  Cardiovascular: Well pefursed, no jugular venous distention evident   Abdominal:  Soft, non-tender, non-distended  Lymphatics: No cervical or suprascapular adenopathy  Skin: Warm, dry, no rash on visualized skin surfaces  Musculoskeletal: Symmetric strength, no obvious gross abnormalities  Psychiatric: Alert and oriented ×3, normal affect   Perianal: No surrounding erythema.  Well-healed abscess cavity.    Pathology  None to review.    Scuot Villatoro MD  Colorectal & General Surgery  [facility]    [address], [address]  [place], [place], [address]  P: [phone]  F: [phone]

## 2023-11-09 ENCOUNTER — TELEPHONE (OUTPATIENT)
Dept: SURGERY | Facility: CLINIC | Age: 73
End: 2023-11-09
Payer: MEDICARE

## 2023-11-09 NOTE — TELEPHONE ENCOUNTER
I spoke with Michaela to give her verbal orders to switch Dilip from wet to dry dressings to silver alginate, changing the dressings once per day. Michaela verbalized understanding.

## 2023-11-09 NOTE — TELEPHONE ENCOUNTER
Michaela from [facility] home care left a voicemail on the clinical triage line wanting to switch from wet to dry dressings to silver alginate dressings, s/p Incision and drainage of right gluteal abscess, Debridement of skin and subcutaneous tissue of the right buttock measuring 3 x 10 x 3 cm, Anorectal examination under anesthesia. Are you okay with this? If so how many times per day?

## 2023-11-21 ENCOUNTER — TELEPHONE (OUTPATIENT)
Dept: SURGERY | Facility: CLINIC | Age: 73
End: 2023-11-21
Payer: MEDICARE

## 2023-11-21 NOTE — TELEPHONE ENCOUNTER
Left message for Nurse to call back about getting patient schedule to see Dr. Villatoro for a wound check.   Okay for Hub to schedule next available.

## 2023-11-30 ENCOUNTER — TELEPHONE (OUTPATIENT)
Dept: SURGERY | Facility: CLINIC | Age: 73
End: 2023-11-30
Payer: MEDICARE

## 2023-11-30 NOTE — TELEPHONE ENCOUNTER
Home health nurse Michaela called to get verbal orders for physical therapy. Called nurse back to give the verbal order per Dr. Villatoro.

## 2023-12-01 ENCOUNTER — OFFICE VISIT (OUTPATIENT)
Dept: WOUND CARE | Facility: HOSPITAL | Age: 73
End: 2023-12-01
Payer: MEDICARE

## 2023-12-01 PROCEDURE — G0463 HOSPITAL OUTPT CLINIC VISIT: HCPCS

## 2023-12-14 NOTE — TELEPHONE ENCOUNTER
Caller: [facility]    Relationship: Other    Best call back number: [phone]    What is the best time to reach you: ANY    Who are you requesting to speak with (clinical staff, provider,  specific staff member): DAVE OSBORNE/DR. TRAN      What was the call regarding: AGENT CALLED TO VERIFY IF THE REVISED FAXED PAPERWORK WAS RECEIVED. TWO FORMS FOR A HOME ESTIM DEVICE NEED A SIGNATURE. PLEASE VERIFY IF THEY HAVE BEEN RECEIVED. PLEASE FAX BACK TO:   [phone]    Do you require a callback: YES   Universal Safety Interventions

## 2023-12-15 ENCOUNTER — OFFICE VISIT (OUTPATIENT)
Dept: WOUND CARE | Facility: HOSPITAL | Age: 73
End: 2023-12-15
Payer: MEDICARE

## 2023-12-15 PROCEDURE — G0463 HOSPITAL OUTPT CLINIC VISIT: HCPCS

## 2023-12-18 ENCOUNTER — TELEPHONE (OUTPATIENT)
Dept: NEUROLOGY | Facility: CLINIC | Age: 73
End: 2023-12-18

## 2023-12-21 ENCOUNTER — OFFICE VISIT (OUTPATIENT)
Dept: NEUROLOGY | Facility: CLINIC | Age: 73
End: 2023-12-21
Payer: MEDICARE

## 2023-12-21 VITALS
DIASTOLIC BLOOD PRESSURE: 72 MMHG | HEIGHT: 69 IN | BODY MASS INDEX: 27.85 KG/M2 | WEIGHT: 188 LBS | RESPIRATION RATE: 20 BRPM | HEART RATE: 80 BPM | OXYGEN SATURATION: 95 % | SYSTOLIC BLOOD PRESSURE: 118 MMHG

## 2023-12-21 DIAGNOSIS — M48.02 CERVICAL STENOSIS OF SPINAL CANAL: Primary | ICD-10-CM

## 2023-12-21 DIAGNOSIS — M21.371 BILATERAL FOOT-DROP: ICD-10-CM

## 2023-12-21 DIAGNOSIS — M48.061 SPINAL STENOSIS, LUMBAR REGION, WITHOUT NEUROGENIC CLAUDICATION: ICD-10-CM

## 2023-12-21 DIAGNOSIS — M54.42 LOW BACK PAIN WITH BILATERAL SCIATICA, UNSPECIFIED BACK PAIN LATERALITY, UNSPECIFIED CHRONICITY: ICD-10-CM

## 2023-12-21 DIAGNOSIS — M21.372 BILATERAL FOOT-DROP: ICD-10-CM

## 2023-12-21 DIAGNOSIS — M54.41 LOW BACK PAIN WITH BILATERAL SCIATICA, UNSPECIFIED BACK PAIN LATERALITY, UNSPECIFIED CHRONICITY: ICD-10-CM

## 2023-12-21 PROCEDURE — 99214 OFFICE O/P EST MOD 30 MIN: CPT | Performed by: STUDENT IN AN ORGANIZED HEALTH CARE EDUCATION/TRAINING PROGRAM

## 2023-12-21 RX ORDER — PEN NEEDLE, DIABETIC 32GX 5/32"
NEEDLE, DISPOSABLE MISCELLANEOUS
COMMUNITY
Start: 2023-12-14

## 2023-12-21 RX ORDER — TRAMADOL HYDROCHLORIDE 50 MG/1
50 TABLET ORAL
COMMUNITY
Start: 2023-10-16

## 2023-12-21 RX ORDER — FAMOTIDINE 20 MG/1
1 TABLET, FILM COATED ORAL DAILY
COMMUNITY

## 2023-12-21 RX ORDER — BLOOD SUGAR DIAGNOSTIC
1 STRIP MISCELLANEOUS 4 TIMES DAILY
COMMUNITY
Start: 2023-12-20 | End: 2024-01-19

## 2023-12-21 NOTE — PROGRESS NOTES
"Chief Complaint   Patient presents with    Cervical stenosis of spinal canal       Patient ID: Dilip Verma is a 73 y.o. male.    HPI:    Interval history:  The patient presents to the neurology clinic for follow-up. He has had abnormal movements that he feels like were due to neck issues. When he turns his head, he felt a crunching and grinding sensation. He has seen a surgeon for his neck in the past. We did an MRI of his neck and lower back. He has moderate to severe L3-L4 spinal stenosis and there is likely some mass effect on the descending right L4 nerve root. For the cervical spine, he had moderate to severe canal stenosis at multiple levels and there is some thought that there could be myelomalacia at a level. After this, I referred him to neurosurgery. Surgery was not offered after his first visit, but he has an appt with another neurosurgeon.    The patient is accompanied by his wife today.    The patient reports that he has been doing all right since his last visit 6 months ago but he states that he fell twice approximately 1 week ago. He notes that the first time he was going down a ramp and went to reach for the rail and it was not there. The patient reports that he hurt his ribs when he fell. He states that the pain is intermittent. He notes that the pain is usually at night when he goes to bed. The patient reports that he feels pain in his ribs from the fall. He states that he is sore all through the upper parts of his thighs and in his groin area. He notes that he is getting physical therapy to try to strengthen his legs and back. The patient reports that he is hurting due to his lower back. He states that his neck feels about a 1 out of 10 right now. The patient reports that he is no longer doing any rocking movements in his bed.    The patient reports that he was in the hospital on 10/04/2023 having surgery for an abscess in between his buttocks. He states that it felt like he was \"sitting on " "broken glass\". The patient reports that he is feeling better from that. His wife reports that the wound is healing well. The patient reports that he was told that the wound looks good. His wife notes that while at the hospital, he was experiencing urinary retention, and they were able to get 1 L off his bladder. She notes that it has improved since then, but previously, he was only able to urinate \"1 tablespoon at a time\".     The patient reports that he has numbness and weakness in his bilateral arms and legs. He states that his hand will move, but his right hand will not straighten up. The patient reports that he feels a swollen sensation in his right forearm. He states that he has been to 7 to 8 surgeons, and they told him that there was nothing they could do about it. The patient reports that he is still taking aspirin and working to control his blood sugar. He states that he has had a heart monitor done in the past and confirmed that he did not have atrial fibrillation.    The patient reports that he can walk. He states that he cannot walk around much at home. The patient reports that he has a rollator. He states that he has been working with physical therapy. The patient reports that he has not used a TENS unit other than on his hands. He states that he is still seeing home health. He states that he has a titanium prosthesis.    The patient reports that he has noticed sciatica symptoms shooting down his right leg. He states that it always occurs between 10:00 p.m. and 6:00 a.m. The patient reports that it is hard to sleep. He states that he gets up and tries to walk at all. The patient reports that he will take 8 tramadol and it takes about 2 hours for it to work its way to help with the pain. The patient reports that he has not seen a pain management doctor yet. He states that he gets the tramadol prescribed through palliative care. The patient reports that he is not on hospice care. He states that there was " a time that he was on no code but thought with the way his health was progressing he needed to revise it.    The patient has had 3 strokes in the past.    The following portions of the patient's history were reviewed and updated as appropriate: allergies, current medications, past family history, past medical history, past social history, past surgical history and problem list.    Review of Systems   Musculoskeletal:  Positive for back pain, gait problem, neck pain and neck stiffness.   Neurological:  Positive for weakness and numbness.           Vitals:    23 1548   BP: 118/72   Pulse: 80   Resp: 20   SpO2: 95%       Neurologic Exam     Mental Status   Speech: speech is normal   Level of consciousness: alert    Cranial Nerves     CN II   Visual fields full to confrontation.     CN III, IV, VI   Pupils are equal, round, and reactive to light.  Extraocular motions are normal.     CN V   Facial sensation intact.     CN VIII   Hearing: intact    CN IX, X   Palate: symmetric    CN XI   Right trapezius strength: normal  Left trapezius strength: normal    CN XII   Tongue: not atrophic  Fasciculations: absent  Tongue deviation: none  Left mouth doesn't open as much - pt notes since 2019     Motor Exam   Muscle bulk: normal    Strength   Right deltoid: 5/5  Left deltoid: 5/5  Right biceps: 5/5  Left biceps: 5/5  Right triceps: 5/5  Left triceps: 5/5  Right iliopsoas: 5/5  Left iliopsoas: 5/5  Right quadriceps: 5/5  Left quadriceps: 5/5  Right hamstrin/5  Left hamstrin/5  Right anterior tibial: 2/5  Left anterior tibial: 1/5  Right gastroc: 4/5  Left gastroc: 5/5Grip 5 out of 5 left, unable to  on right, R> L thenar atrophy     Sensory Exam   Right arm light touch: normal  Left arm light touch: normal  Right leg light touch: decreased from knee  Left leg light touch: decreased from knee  Decr to LT from mid calf down - pins and needles sensation in lower extremities in this region as well.     Gait,  Coordination, and Reflexes     Coordination   Finger to nose coordination: normal    Reflexes   Right brachioradialis: 2+  Left brachioradialis: 2+  Right biceps: 0  Left biceps: 0  Right patellar: 0  Left patellar: 0  Right achilles: 0  Left achilles: 0Uses rolling walker at home, in wheelchair. unable to do L  HTS due to prosthesis, R HTS normal       Physical Exam  Eyes:      Extraocular Movements: EOM normal.      Pupils: Pupils are equal, round, and reactive to light.   Neurological:      Coordination: Finger-Nose-Finger Test normal.      Deep Tendon Reflexes:      Reflex Scores:       Bicep reflexes are 0 on the right side and 0 on the left side.       Brachioradialis reflexes are 2+ on the right side and 2+ on the left side.       Patellar reflexes are 0 on the right side and 0 on the left side.       Achilles reflexes are 0 on the right side and 0 on the left side.  Psychiatric:         Speech: Speech normal.         Procedures    Assessment/Plan:         Diagnoses and all orders for this visit:    1. Cervical stenosis of spinal canal (Primary)    2. Low back pain with bilateral sciatica, unspecified back pain laterality, unspecified chronicity    3. Bilateral foot-drop           1. Spinal stenosis, cervical and lumbar  - The patient will see Dr. Solis on 01/10/2024 and discuss his symptoms including weakness, incontinence and imaging findings. I think he may have some significant symptoms from imaging abnormalities, if surgery is not offered, he should understand why or why not after meeting with the surgery team.  - The patient will talk to his physical therapist about the TENS therapy in case it can help with foot drop.       The patient will follow up in 4 months.    I spent 35 minutes caring for this patient on this date of service. This time includes time spent by me in the following activities as necessary: preparing for the visit, reviewing tests, medical records and previous visits, obtaining and/or  reviewing a separately obtained history, performing a medically appropriate exam and/or evaluation, counseling and educating the patient, and/or communicating with other healthcare professionals, documenting information in the medical record, independently interpreting results and communicating that information with the patient, and developing a medically appropriate treatment plan with consideration of other conditions, medications, and treatments.          Transcribed from ambient dictation for Jose Le MD by Mehreen Doan.  12/21/23   17:56 EST    Patient or patient representative verbalized consent to the visit recording.  I have personally performed the services described in this document as transcribed by the above individual, and it is both accurate and complete.  Jose Le MD  12/23/2023  00:41 EST

## 2024-01-05 ENCOUNTER — OFFICE VISIT (OUTPATIENT)
Dept: WOUND CARE | Facility: HOSPITAL | Age: 74
End: 2024-01-05
Payer: MEDICARE

## 2024-01-05 PROCEDURE — G0463 HOSPITAL OUTPT CLINIC VISIT: HCPCS

## 2024-01-08 NOTE — PROGRESS NOTES
Patient ID: Dilip Verma is a 73 y.o. male is here today for a hospital follow-up for spinal stenosis.  Last completed MRI on 10/05/2023.    Subjective     The patient is here in regards to   Chief Complaint   Patient presents with    Back Pain    Follow-up       History of Present Illness  Ck has had several small punctate bilateral strokes.  Over the past 8 years he has had progressive worsening of his right hand to the point where he is no longer able to grasp with his right hand actively.  He was scheduled to have a nerve transfer procedure with [facility] but that was postponed indefinitely for triple bypass surgery from which she recovered well.  He has had 10 years of bilateral foot drop.  He is to have a painful paresthesia in his arms and legs but that has subsided over the years and now he only has occasional discomfort in his neck.  He was recently hospitalized for urinary retention, that has improved spontaneously but not gone back to normal yet.      While in the room and during my examination of the patient I wore a mask and eye protection.  I washed my hands before and after this patient encounter.  The patient was also wearing a mask.    The following portions of the patient's history were reviewed and updated as appropriate: allergies, current medications, past family history, past medical history, past social history, past surgical history and problem list.    Review of Systems   HENT:  Positive for tinnitus.    Respiratory:  Negative for chest tightness and shortness of breath.    Cardiovascular:  Negative for chest pain.   Musculoskeletal:  Positive for back pain, gait problem, neck pain and neck stiffness.   Neurological:  Positive for weakness (mainly the right side), light-headedness and numbness (ang tingling). Negative for dizziness and headaches.        Past Medical History:   Diagnosis Date    AMENA (acute kidney injury) 12/03/2020    Alcoholism 1989    not since 1998    CORI  positive     Anemia     Anxiety     Ataxia     Atypical chest pain 04/19/2022    [facility]    Balance disorder     Cataract     BILATERAL, S/P EXTRACTION    Cervical radiculopathy 11/11/2019    [facility]    Cervical spinal cord compression     Chronic diastolic (congestive) heart failure     Chronic kidney disease     STAGE 3, FOLLOWED BY DR. VIVAR    Chronic pancreatitis     Closed left subtrochanteric femur fracture 12/01/2020    [facility]    Closed nondisplaced intertrochanteric fracture of left femur 12/01/2020    [facility]    Colon polyps     FOLLOWED BY DR. AVTAR JEONG    Constipation     Contracture, right hand     Coronary artery disease     CABG 7/2019    COVID-19 07/2022    DDD (degenerative disc disease), cervical     Diabetes mellitus, type II     IDDM    Diabetic retinopathy     Difficulty walking     Dysphagia     Elevated brain natriuretic peptide (BNP) level 08/2014    Elevated LFTs 03/2021    Erectile dysfunction     Fissure, anal 2022    Foot drop     Fuchs' corneal dystrophy of right eye     Glaucoma     BILATERAL    History of alcohol abuse     Hyperlipidemia     Hypersomnia     Hypertension     Hypertensive urgency 02/25/2020    [facility]    Insomnia     Kidney stones     LV dysfunction 06/2016    Lyme disease     Lymphadenopathy syndrome 05/2021    Macular edema     BILATERAL    Myocardial infarction 11/05/2019    NSTEMI, [facility]    Myocardial infarction 07/12/2019    NSTEMI, [facility]    Neuropathy in diabetes     Non-celiac gluten sensitivity     Orthostasis     Osteoporosis     Oxygen dependent     PAD (peripheral artery disease)     PNA (pneumonia) 06/2016    LEFT LOBE    Polyneuropathy     PTSD (post-traumatic stress disorder)     Pulmonary hypertension     Pulmonary nodule     Senile ectropion of both lower eyelids 02/2022    Sepsis 12/16/2020    D/T UTI, [facility]    Sleep apnea     STATES DOESN'T USE BIPAP OR CPAP    Spinal stenosis in  "cervical region     SEVERE-LIMITED ROM    Stroke     \"slight stroke\"    Syncope and collapse 2019    ADMITTED TO [place]    Urinary retention 2021    SEEN AT [facility] ER    Vision loss     Vitamin D deficiency        Allergies   Allergen Reactions    Ace Inhibitors Angioedema    Angiotensin Receptor Blockers Angioedema and Unknown (See Comments)     Angioneurotic edema    Dapagliflozin Other (See Comments)     UTI,  rectal abcess    Losartan Angioedema     Angioneurotic edema    Seroquel [Quetiapine] Hallucinations    Xanax [Alprazolam] Unknown - High Severity    Amlodipine Swelling    Ativan [Lorazepam] Hallucinations    Baclofen Hallucinations    Misc. Sulfonamide Containing Compounds Unknown (See Comments)    Minoxidil Confusion    Tetracycline Rash     bliisters in mouth         Family History   Problem Relation Age of Onset    Heart disease Mother     Hypertension Mother     Hyperlipidemia Mother     Depression Mother     Cancer Mother         uterine    Arrhythmia Mother     Uterine cancer Mother     Mental illness Mother     Migraines Mother     Hyperlipidemia Father     Heart disease Father     Hypertension Father     Kidney disease Father     Thyroid disease Father     Heart failure Father     Depression Sister     Alcohol abuse Brother     Thyroid disease Paternal Uncle     Lung disease Paternal Uncle     Stroke Maternal Grandmother     Glaucoma Maternal Grandmother     Heart attack Paternal Grandmother     Stroke Paternal Grandmother     Alcohol abuse Paternal Grandfather     Malig Hyperthermia Neg Hx        Social History     Socioeconomic History    Marital status:    Tobacco Use    Smoking status: Former     Packs/day: 0.75     Years: 16.00     Additional pack years: 0.00     Total pack years: 12.00     Types: Cigarettes     Quit date: 2000     Years since quittin.0     Passive exposure: Past    Smokeless tobacco: Never    Tobacco comments:     Start age:39/Stopping age:47, " 3/4 PPD   Vaping Use    Vaping Use: Never used   Substance and Sexual Activity    Alcohol use: Not Currently     Comment: none since 1996    Drug use: No    Sexual activity: Not Currently     Birth control/protection: Vasectomy       Past Surgical History:   Procedure Laterality Date    CARDIAC CATHETERIZATION N/A 07/15/2019    Procedure: Coronary angiography;  Surgeon: Carrie Price MD;  Location:  [facility];  Service: Cardiovascular    CARDIAC CATHETERIZATION N/A 07/15/2019    Procedure: Left Heart Cath;  Surgeon: Carrie Price MD;  Location:  [facility];  Service: Cardiovascular    CARDIAC CATHETERIZATION N/A 07/15/2019    Procedure: Left ventriculography;  Surgeon: Carrie Price MD;  Location:  [facility];  Service: Cardiovascular    CARDIAC CATHETERIZATION  07/15/2019    Procedure: Functional Flow Midway;  Surgeon: Carrie Price MD;  Location:  [facility];  Service: Cardiovascular    CARDIAC CATHETERIZATION N/A 11/06/2019    Procedure: Right and Left Heart Cath;  Surgeon: Mya Smith MD;  Location:  [facility];  Service: Cardiovascular    CARDIAC CATHETERIZATION N/A 11/06/2019    Procedure: Coronary angiography;  Surgeon: Mya Smith MD;  Location:  [facility];  Service: Cardiovascular    CARDIAC SURGERY      CATARACT EXTRACTION Left 2014    CATARACT EXTRACTION Right 11/2016    PHACO/IOL, DR. LEN DENTON    COLONOSCOPY N/A 03/16/2023    ENTIRE COLON WNL, RESCOPE IN 5 YRS, DR. LINDA LIZARRAGA AT [facility]    COLONOSCOPY W/ POLYPECTOMY N/A 01/02/2015    A FEW DIVERTICULA IN SIGMOID, 6 MM TUBULOVILLOUS ADENOMA POLYP IN RECTUM, SMALL HEMORRHOIDS, MELANOSIS COLI, DR. AVTAR JEONG AT [place]    CORONARY ARTERY BYPASS GRAFT N/A 07/18/2019    Procedure: INTRAOPERATIVE SHOAIB; STERNOTOMY CORONARY ARTERY BYPASS x 3  USING LEFT INTERNAL MAMMARY ARTERY GRAFT UTILIZING ENDOSCOPICALLY HARVESTED  RIGHT GREATER SAPHENOUS VEIN AND PRP.;  Surgeon: Bill Devi MD;  Location: [facility] MAIN OR;  Service: Cardiothoracic    CYSTOSCOPY BLADDER STONE LITHOTRIPSY N/A     DENTAL PROCEDURE Bilateral     3 surgeries inder implants    FEMUR IM NAILING/RODDING Left 12/03/2020    Procedure: LEFT HIP INTRAMEDULLARY NAIL;  Surgeon: Niraj Ravi MD;  Location: [facility] MAIN OR;  Service: Orthopedic Spine;  Laterality: Left;    INCISION AND DRAINAGE PERIRECTAL ABSCESS N/A 10/04/2023    Procedure: Incision and drainage of perianal and buttock abscess;  Surgeon: Herbie Villatoro MD;  Location: [facility] MAIN OR;  Service: General;  Laterality: N/A;    INGUINAL HERNIA REPAIR Bilateral     TOENAIL EXCISION  06/2022    TONSILLECTOMY Bilateral     TOTAL HIP ARTHROPLASTY REVISION Left 01/11/2022    Procedure: TOTAL HIP ARTHROPLASTY REVISION- POSTERIOR;  Surgeon: Jurgen Candelaria II, MD;  Location: [facility] OR;  Service: Orthopedics;  Laterality: Left;    VASECTOMY N/A          Objective     Vitals:    01/10/24 1135   BP: 142/72   Pulse: 65   Resp: 16   SpO2: 93%     Body mass index is 27.76 kg/m².    Physical Exam  Constitutional:       Appearance: Normal appearance.   HENT:      Head: Normocephalic and atraumatic.   Eyes:      Extraocular Movements: Extraocular movements intact.      Conjunctiva/sclera: Conjunctivae normal.      Pupils: Pupils are equal, round, and reactive to light.   Cardiovascular:      Rate and Rhythm: Normal rate and regular rhythm.      Pulses: Normal pulses.   Pulmonary:      Breath sounds: Normal breath sounds.   Abdominal:      Palpations: Abdomen is soft.   Musculoskeletal:         General: Normal range of motion.      Cervical back: Normal range of motion and neck supple.   Skin:     General: Skin is warm and dry.   Neurological:      Mental Status: He is alert and oriented to person, place, and time.      Cranial Nerves: Cranial nerves 2-12 are intact.      Motor: Motor function is  intact. No weakness or atrophy.      Coordination: Coordination is intact. Romberg sign negative. Romberg Test abnormal.      Gait: Gait normal.      Deep Tendon Reflexes:      Reflex Scores:       Tricep reflexes are 0 on the right side and 0 on the left side.       Bicep reflexes are 0 on the right side and 0 on the left side.       Brachioradialis reflexes are 0 on the right side and 0 on the left side.       Patellar reflexes are 0 on the right side and 0 on the left side.       Achilles reflexes are 0 on the right side and 0 on the left side.  Psychiatric:         Speech: Speech normal.         Neurologic Exam     Mental Status   Oriented to person, place, and time.   Attention: normal. Concentration: normal.   Speech: speech is normal   Level of consciousness: alert    Cranial Nerves   Cranial nerves II through XII intact.     CN III, IV, VI   Pupils are equal, round, and reactive to light.    Motor Exam   Muscle bulk: normal  Overall muscle tone: normal    Strength   Strength 5/5 except as noted.   Right wrist flexion: 2/5  Right wrist extension: 2/5  Right interossei: 1/5  Right anterior tibial: 0/5  Left anterior tibial: 0/5    Sensory Exam   Light touch normal.     Gait, Coordination, and Reflexes     Gait  Gait: wide-based    Coordination   Romberg: positive    Reflexes   Reflexes 2+ except as noted.   Right brachioradialis: 0  Left brachioradialis: 0  Right biceps: 0  Left biceps: 0  Right triceps: 0  Left triceps: 0  Right patellar: 0  Left patellar: 0  Right achilles: 0  Left achilles: 0  Right : 0  Left : 0  Right Medina: absent  Left Medina: absent      Assessment & Plan   Independent Review of Radiographic Studies:      I personally reviewed the images from the following studies.    MR: MRI of the cervical spine wo contrast was reviewed and shows severe stenosis at multiple levels including C3-4, C4-5, C5-6, C6-7.  There is no obvious cord signal change.  MRI of the lumbar spine wo  contrast was reviewed and shows severe stenosis at L2-3 and L3-4 with lateral recess stenosis.  There is also broad-based disc bulge at L5-S1 and disc bulge eccentric to the left causing left-sided foraminal stenosis at L5-S1.    Assessment/Plan: Ck has significant weakness in his right hand and bilateral feet but his imaging does not correlate well with his symptoms.  Aside from his areas of weakness, he is nearly full strength and actually very strong and his other muscular groups.  I would also expect him to have significant hyperreflexia or Masood sign as result of chronic stenosis but he is essentially areflexic throughout his body.  I do think that there is some other underlying issue outside of his spine perhaps related to his peripheral nerves or musculature and although he has previously had an EMG that was nondiagnostic, it could be useful to try again.    I will discuss his case with his neurologist Dr. Le to see if there is any additional workup that would be helpful.    Medical Decision Making:      EMG of the upper extremities  EMG of the bilateral lower extremities         Diagnoses and all orders for this visit:    1. Arm weakness (Primary)  -     EMG both arms; Future  -     Nerve Conduction Test both arms; Future  -     EMG Both Legs; Future  -     Nerve Conduction Test Both Legs; Future    2. Weakness of both lower extremities  -     EMG both arms; Future  -     Nerve Conduction Test both arms; Future  -     EMG Both Legs; Future  -     Nerve Conduction Test Both Legs; Future             Patient Instructions/Recommendations:    Follow-up as needed after EMG results      Tyler Solis MD  01/10/24  12:29 EST

## 2024-01-10 ENCOUNTER — OFFICE VISIT (OUTPATIENT)
Dept: NEUROSURGERY | Facility: CLINIC | Age: 74
End: 2024-01-10
Payer: MEDICARE

## 2024-01-10 VITALS
OXYGEN SATURATION: 93 % | HEIGHT: 69 IN | BODY MASS INDEX: 27.85 KG/M2 | HEART RATE: 65 BPM | WEIGHT: 188 LBS | DIASTOLIC BLOOD PRESSURE: 72 MMHG | SYSTOLIC BLOOD PRESSURE: 142 MMHG | RESPIRATION RATE: 16 BRPM

## 2024-01-10 DIAGNOSIS — R29.898 ARM WEAKNESS: Primary | ICD-10-CM

## 2024-01-10 DIAGNOSIS — R29.898 WEAKNESS OF BOTH LOWER EXTREMITIES: ICD-10-CM

## 2024-01-23 ENCOUNTER — OFFICE VISIT (OUTPATIENT)
Dept: CARDIOLOGY | Facility: CLINIC | Age: 74
End: 2024-01-23
Payer: MEDICARE

## 2024-01-23 VITALS
DIASTOLIC BLOOD PRESSURE: 80 MMHG | WEIGHT: 191 LBS | SYSTOLIC BLOOD PRESSURE: 132 MMHG | HEART RATE: 73 BPM | BODY MASS INDEX: 28.29 KG/M2 | OXYGEN SATURATION: 95 % | HEIGHT: 69 IN

## 2024-01-23 DIAGNOSIS — I73.9 PAD (PERIPHERAL ARTERY DISEASE): ICD-10-CM

## 2024-01-23 DIAGNOSIS — I25.10 CORONARY ARTERY DISEASE INVOLVING NATIVE CORONARY ARTERY OF NATIVE HEART WITHOUT ANGINA PECTORIS: Primary | ICD-10-CM

## 2024-01-23 DIAGNOSIS — I50.32 CHRONIC DIASTOLIC CHF (CONGESTIVE HEART FAILURE): ICD-10-CM

## 2024-01-23 PROCEDURE — 99214 OFFICE O/P EST MOD 30 MIN: CPT | Performed by: INTERNAL MEDICINE

## 2024-01-23 PROCEDURE — 3079F DIAST BP 80-89 MM HG: CPT | Performed by: INTERNAL MEDICINE

## 2024-01-23 PROCEDURE — 3075F SYST BP GE 130 - 139MM HG: CPT | Performed by: INTERNAL MEDICINE

## 2024-01-23 PROCEDURE — 1160F RVW MEDS BY RX/DR IN RCRD: CPT | Performed by: INTERNAL MEDICINE

## 2024-01-23 PROCEDURE — 1159F MED LIST DOCD IN RCRD: CPT | Performed by: INTERNAL MEDICINE

## 2024-01-23 RX ORDER — DULOXETIN HYDROCHLORIDE 30 MG/1
1 CAPSULE, DELAYED RELEASE ORAL DAILY
COMMUNITY
Start: 2024-01-16

## 2024-01-23 NOTE — PROGRESS NOTES
[facility]      Patient Name: Dilip Verma  :1950  Age: 73 y.o.  Encounter Provider:  Amador Reyes Jr, MD      Chief Complaint: Follow-up coronary artery disease status post CABG 2019, chronic diastolic heart failure, COPD on nocturnal oxygen, BiPAP noncompliance, diabetes, hypertension and dyslipidemia      Coronary Artery Disease  Symptoms include leg swelling and shortness of breath. Pertinent negatives include no chest pain, dizziness or palpitations.     Dilip Verma is a 73 y.o. male with history of hypertension, hyperlipidemia, diabetes mellitus, coronary artery disease, congestive heart failure, thalamic hemorrhage, pulmonary hypertension, and obstructive sleep apnea.  He is here to establish care with me today.     He was admitted to [facility] in 2019 for syncope.  He was orthostatic with supine hypertension.  Clonidine was discontinued and hydralazine was increased.  He then presented to [facility] on 2019 with uncontrolled blood pressure and blurry vision.  He had an abnormal BNP and troponin.  Echocardiogram showed an ejection fraction of 53%, moderate left ventricular hypertrophy, mild to moderate left atrial enlargement and no significant valvular disease.  He had a Holter monitor which showed paroxysmal atrial tachycardia.  Due to his elevated troponin he underwent cardiac catheterization which showed normal ejection fraction with multivessel coronary artery disease. He underwent coronary artery bypass grafting with LIMA to LAD, vein graft to obtuse marginal 1, vein graft to distal right coronary artery. He was noted to have postoperative atrial fibrillation and was discharged on amiodarone.     He was readmitted in early 2019 with chest pain and non-STEMI.  Perfusion stress test suggested inferior wall and lateral wall ischemia. Heart catheterization completed 2019 showed widely patent grafts.      On 2020 he fell  and was diagnosed with multiple femoral fractures.    He has undergone 2 separate surgical interventions for his leg which have left him immobilized over the last few months.  He just got home from skilled nursing facility in has been able to bear weight for a long time.  This has been disruptive to monitoring fluid status at home and she noted that his waist increased in size significantly.  He is feeling more short of breath.  He has increased lower extremity edema.  No angina.  No dizziness or syncope.  Has been sleeping in a recliner.  He is not using his BiPAP as he feels that he needs oxygen at night and visit has been using nasal cannula.  He is currently on Lasix 80 mg in the morning and 40 mg at night.     Patient saw Danii Guerrier in April 2021 and volume status was not improved.  He was admitted to the hospital and diuresed well.  Volume status stable on current outpatient regimen.  Patient is increasing activity as tolerated.  He still very weak from the multiple hospitalizations with a lot of pain in the left leg.  He denies orthopnea or PND.  Lower extremity edema is much improved.    He has done well since then. Tolerating diuretics well.  Total hip replacement in January.  Uncomplicated surgery with ongoing recovery.  He is participating in physical therapy several times per week with no angina or heart failure.  He has left lower extremity edema which is chronic and stable.  No orthopnea or PND.  Last LDL 73.  Social and family history was reviewed and is not pertinent to this clinic visit.    He saw LEIDY Dennison at last visit.  He was hospitalized for gluteal abscess and has been battling with antibiotic course and wound care.  He is just getting over this and seems to be doing better.  He notes with less activity he has increased lower extremity edema.  He wears his compression stockings a few days per week but is not consistent.  Blood pressure and heart rate are well-controlled today in  "clinic.  No angina.  No orthopnea or PND.  No palpitations, dizziness or syncope.  Social family history was reviewed and is not pertinent to this clinic visit.    The following portions of the patient's history were reviewed and updated as appropriate: allergies, current medications, past family history, past medical history, past social history, past surgical history and problem list.      Review of Systems   Constitutional: Negative for chills and fever.   HENT:  Negative for hoarse voice and sore throat.    Eyes:  Negative for double vision and photophobia.   Cardiovascular:  Positive for leg swelling. Negative for chest pain, dyspnea on exertion, near-syncope, orthopnea, palpitations, paroxysmal nocturnal dyspnea and syncope.   Respiratory:  Positive for shortness of breath. Negative for cough and wheezing.    Skin:  Negative for poor wound healing and rash.   Musculoskeletal:  Negative for arthritis and joint swelling.   Gastrointestinal:  Negative for bloating, abdominal pain, hematemesis and hematochezia.   Neurological:  Negative for dizziness and focal weakness.   Psychiatric/Behavioral:  Negative for depression and suicidal ideas.      ROS was reviewed, updated and amended when necessary.    OBJECTIVE:   Vital Signs  Vitals:    01/23/24 1252   BP: 132/80   Pulse: 73   SpO2: 95%     Estimated body mass index is 28.21 kg/m² as calculated from the following:    Height as of this encounter: 175.3 cm (69\").    Weight as of this encounter: 86.6 kg (191 lb).    Vitals reviewed.   Constitutional:       Appearance: Healthy appearance. Not in distress.   Neck:      Vascular: JVR present. JVD normal.   Pulmonary:      Effort: Pulmonary effort is normal.      Breath sounds: No wheezing. No rhonchi. No rales.   Chest:      Chest wall: Not tender to palpatation.   Cardiovascular:      PMI at left midclavicular line. Normal rate. Regular rhythm. Normal S1. Normal S2.       Murmurs: There is no murmur.      No gallop.  " No click. No rub.   Pulses:     Intact distal pulses.   Edema:     Peripheral edema present.     Pretibial: 1+ edema of the left pretibial area.     Ankle: 1+ edema of the left ankle.     Feet: 1+ edema of the left foot.  Abdominal:      General: Bowel sounds are normal.      Palpations: Abdomen is soft.      Tenderness: There is no abdominal tenderness.   Musculoskeletal: Normal range of motion.         General: No tenderness. Skin:     General: Skin is cool and dry.   Neurological:      General: No focal deficit present.      Mental Status: Alert and oriented to person, place and time.     Physical exam was reviewed, updated and amended when necessary.    Procedures          ASSESSMENT:     71-year-old male with multiple medical comorbidities presents for worsening heart failure.    PLAN OF CARE:     Coronary artery disease status post coronary artery bypass grafting times 3 July 2019 with LIMA to LAD, vein graft to obtuse marginal 1, vein graft to distal right coronary artery with normal left ventricular systolic function.  Then non-STEMI and  inferolateral wall ischemia on perfusion stress test.  Cardiac catheterization 11/2019 showed patent grafts normal left ventricular systolic function.  No angina.  Continue aspirin and beta-blocker.  LDL is reasonably well controlled off of statin and he is reticent to starting any other medications at this time.  Negative stress study in October 2022.  We will continue to monitor closely.  We will call PCP for latest lipid profile.  2.  Hypertension -fair control today in clinic.  Sodium restricted diet.  Twice daily blood pressure log to be sent to the office after 1 week.  3.  Hyperlipidemia-he is not on statin therapy and is reticent to start a new medication regimen however I will request latest lipid profile and discussed with him.  4.  Diabetes mellitus followed by PCP  5.  Obstructive sleep apnea still having difficulty tolerating BiPAP.  Follows with Dr. Cai    6.  Pulmonary hypertension  7.  Postoperative atrial fibrillation- no known recurrence  8.  Bilateral carotid artery stenosis mild on duplex July 2019.  Following with vascular surgery.  9.  Grade 3 diastolic dysfunction on echocardiogram July 2019  10. Remote history of thalamic hemorrhage noted on MRI July 2019  11. History of hypogonadism with prior testosterone replacement treatment.    12.  Chronic diastolic heart failure -blood pressure better controlled at home. Euvolemic today in clinic other than chronic left lower extremity swelling.  Element of volume retention secondary to CKD.  Bumex therapy managed by nephrology.  13. Chronic kidney disease follows with nephrology   14.  Diminished lower extremity pulses - abnormal AMINTA. Following with vascular surgery.    Cardiac issues stable.  Return to clinic 6 months.           Discharge Medications            Accurate as of January 23, 2024 12:54 PM. If you have any questions, ask your nurse or doctor.                Changes to Medications        Instructions Start Date   insulin lispro 100 UNIT/ML injection  Commonly known as: humaLOG  What changed: additional instructions   0-14 Units, Subcutaneous, 3 Times Daily Before Meals             Continue These Medications        Instructions Start Date   aspirin 81 MG EC tablet   81 mg, Oral, Daily      BD Pen Needle Krista 2nd Gen 32G X 4 MM misc  Generic drug: Insulin Pen Needle   INJECT ONE NEEDLE INTO THE SKIN FOUR TIMES A DAY      brimonidine 0.2 % ophthalmic solution  Commonly known as: ALPHAGAN   1 drop, Both Eyes, 2 Times Daily      bumetanide 1 MG tablet  Commonly known as: BUMEX   0.5 mg, Oral, 2 Times Daily, Holding Since 9/30/23      DULoxetine 30 MG capsule  Commonly known as: CYMBALTA   1 capsule, Oral, Daily      famotidine 20 MG tablet  Commonly known as: PEPCID   1 tablet, Daily      Insulin Glargine (2 Unit Dial) 300 UNIT/ML solution pen-injector injection  Commonly known as: TOUJEO   30 Units,  Subcutaneous, Daily      latanoprost 0.005 % ophthalmic solution  Commonly known as: XALATAN   1 drop, Both Eyes, Every Night at Bedtime      Magnesium 400 MG capsule   400 mg, As Needed      O2  Commonly known as: OXYGEN   2 Liter O2 - NIGHTLY (route: Oxygen)      testosterone 25 MG/2.5GM (1%) gel gel  Commonly known as: ANDROGEL   25 mg, Transdermal, Daily      traMADol 50 MG tablet  Commonly known as: ULTRAM   50 mg, Oral      Unable to find   1 each, Topical, Once, Cbd pain stick      Vitamin D-3 125 MCG (5000 UT) tablet   1 tablet, Oral, Every 24 Hours               Thank you for allowing me to participate in the care of your patient,      Sincerely,   Amador Reyes MD  [facility]  01/23/24  12:54 EST

## 2024-01-26 ENCOUNTER — OFFICE VISIT (OUTPATIENT)
Dept: WOUND CARE | Facility: HOSPITAL | Age: 74
End: 2024-01-26
Payer: MEDICARE

## 2024-01-26 PROCEDURE — G0463 HOSPITAL OUTPT CLINIC VISIT: HCPCS

## 2024-03-06 ENCOUNTER — TELEPHONE (OUTPATIENT)
Dept: NEUROLOGY | Facility: CLINIC | Age: 74
End: 2024-03-06
Payer: MEDICARE

## 2024-03-06 NOTE — TELEPHONE ENCOUNTER
Attempted to leave VM but mailbox was full, MyChart (if applicable) & mailed reminder regarding appt reschedule due to provider being out of office.

## 2024-03-07 DIAGNOSIS — I70.213 ATHEROSCLEROSIS OF NATIVE ARTERY OF BOTH LOWER EXTREMITIES WITH INTERMITTENT CLAUDICATION: Primary | ICD-10-CM

## 2024-03-07 DIAGNOSIS — I65.23 BILATERAL CAROTID ARTERY STENOSIS: ICD-10-CM

## 2024-03-28 ENCOUNTER — HOSPITAL ENCOUNTER (OUTPATIENT)
Facility: HOSPITAL | Age: 74
Discharge: HOME | End: 2024-03-28
Payer: MEDICARE

## 2024-03-28 ENCOUNTER — OFFICE VISIT (OUTPATIENT)
Age: 74
End: 2024-03-28
Payer: MEDICARE

## 2024-03-28 VITALS
HEIGHT: 69 IN | DIASTOLIC BLOOD PRESSURE: 90 MMHG | WEIGHT: 191 LBS | SYSTOLIC BLOOD PRESSURE: 150 MMHG | BODY MASS INDEX: 28.29 KG/M2

## 2024-03-28 DIAGNOSIS — I10 PRIMARY HYPERTENSION: ICD-10-CM

## 2024-03-28 DIAGNOSIS — I70.213 ATHEROSCLEROSIS OF NATIVE ARTERY OF BOTH LOWER EXTREMITIES WITH INTERMITTENT CLAUDICATION: ICD-10-CM

## 2024-03-28 DIAGNOSIS — I65.23 BILATERAL CAROTID ARTERY STENOSIS: ICD-10-CM

## 2024-03-28 DIAGNOSIS — E11.65 TYPE 2 DIABETES MELLITUS WITH HYPERGLYCEMIA, WITH LONG-TERM CURRENT USE OF INSULIN: ICD-10-CM

## 2024-03-28 DIAGNOSIS — I65.23 CAROTID STENOSIS, BILATERAL: ICD-10-CM

## 2024-03-28 DIAGNOSIS — I65.23 BILATERAL CAROTID ARTERY STENOSIS: Primary | ICD-10-CM

## 2024-03-28 DIAGNOSIS — I73.9 PERIPHERAL ARTERIAL DISEASE: ICD-10-CM

## 2024-03-28 DIAGNOSIS — Z79.4 TYPE 2 DIABETES MELLITUS WITH HYPERGLYCEMIA, WITH LONG-TERM CURRENT USE OF INSULIN: ICD-10-CM

## 2024-03-28 PROBLEM — I65.29 CAROTID STENOSIS: Status: ACTIVE | Noted: 2024-03-28

## 2024-03-28 LAB
BH CV XLRA MEAS LEFT CAROTID BULB EDV: 18.9 CM/SEC
BH CV XLRA MEAS LEFT CAROTID BULB PSV: 86.2 CM/SEC
BH CV XLRA MEAS LEFT DIST CCA EDV: 12.6 CM/SEC
BH CV XLRA MEAS LEFT DIST CCA PSV: 62 CM/SEC
BH CV XLRA MEAS LEFT DIST ICA EDV: -19.6 CM/SEC
BH CV XLRA MEAS LEFT DIST ICA PSV: -70.8 CM/SEC
BH CV XLRA MEAS LEFT MID CCA EDV: 10.4 CM/SEC
BH CV XLRA MEAS LEFT MID CCA PSV: 64.8 CM/SEC
BH CV XLRA MEAS LEFT MID ICA EDV: -21 CM/SEC
BH CV XLRA MEAS LEFT MID ICA PSV: -80.6 CM/SEC
BH CV XLRA MEAS LEFT PROX CCA EDV: 14.1 CM/SEC
BH CV XLRA MEAS LEFT PROX CCA PSV: 98 CM/SEC
BH CV XLRA MEAS LEFT PROX ECA EDV: -14.7 CM/SEC
BH CV XLRA MEAS LEFT PROX ECA PSV: -123.9 CM/SEC
BH CV XLRA MEAS LEFT PROX ICA EDV: -17.5 CM/SEC
BH CV XLRA MEAS LEFT PROX ICA PSV: -63.1 CM/SEC
BH CV XLRA MEAS LEFT PROX SCLA PSV: 115.2 CM/SEC
BH CV XLRA MEAS RIGHT CAROTID BULB EDV: 26.8 CM/SEC
BH CV XLRA MEAS RIGHT CAROTID BULB PSV: 148.8 CM/SEC
BH CV XLRA MEAS RIGHT DIST CCA EDV: 17.5 CM/SEC
BH CV XLRA MEAS RIGHT DIST CCA PSV: 69.4 CM/SEC
BH CV XLRA MEAS RIGHT DIST ICA EDV: -18.7 CM/SEC
BH CV XLRA MEAS RIGHT DIST ICA PSV: -116.3 CM/SEC
BH CV XLRA MEAS RIGHT MID CCA EDV: 14 CM/SEC
BH CV XLRA MEAS RIGHT MID CCA PSV: 74.3 CM/SEC
BH CV XLRA MEAS RIGHT MID ICA EDV: -24.1 CM/SEC
BH CV XLRA MEAS RIGHT MID ICA PSV: -105.2 CM/SEC
BH CV XLRA MEAS RIGHT PROX CCA EDV: 16.5 CM/SEC
BH CV XLRA MEAS RIGHT PROX CCA PSV: 100.7 CM/SEC
BH CV XLRA MEAS RIGHT PROX ECA EDV: -20.4 CM/SEC
BH CV XLRA MEAS RIGHT PROX ECA PSV: -227.3 CM/SEC
BH CV XLRA MEAS RIGHT PROX ICA EDV: -22 CM/SEC
BH CV XLRA MEAS RIGHT PROX ICA PSV: -101 CM/SEC
BH CV XLRA MEAS RIGHT PROX SCLA PSV: 141.6 CM/SEC
BH CV XLRA MEAS RIGHT VERTEBRAL A EDV: -15.5 CM/SEC
BH CV XLRA MEAS RIGHT VERTEBRAL A PSV: -81.3 CM/SEC
LEFT ARM BP: NORMAL MMHG
RIGHT ARM BP: NORMAL MMHG

## 2024-03-28 PROCEDURE — 93880 EXTRACRANIAL BILAT STUDY: CPT

## 2024-03-28 NOTE — PROGRESS NOTES
Chief Complaint  Carotid Artery Disease and Peripheral Vascular Disease    Subjective        Dilip Verma presents to [facility]  HPI   Dilip Verma is a 73 y.o. male that has been followed in our office for carotid artery stenosis and peripheral arterial disease. He returns today in follow up along with a carotid duplex. He reports he fell last night when going out to eat and sustained  a superficial left shin abrasion, therefore he refused his ABIs today. He  reports since the last visit he had an abscess to his buttock that was surgically drained. It has taken him awhile to get his stamina back. He denies any issues with CVA, TIA, or amaurosis fugax. He denies any worsening claudication symptoms, rest pain, tissue loss. He reports he is not ambulating much due to weakness. He is in a wheelchair today.     Review of Systems   Constitutional:  Negative for fever.   Eyes:  Negative for visual disturbance.   Cardiovascular:  Negative for leg swelling.   Gastrointestinal:  Negative for abdominal pain.   Musculoskeletal:  Negative for back pain.   Skin:  Positive for wound. Negative for color change and pallor.   Neurological:  Negative for dizziness, facial asymmetry, speech difficulty and weakness.        Dilip Verma  reports that he quit smoking about 24 years ago. His smoking use included cigarettes. He started smoking about 40 years ago. He has a 12 pack-year smoking history. He has been exposed to tobacco smoke. He has never used smokeless tobacco..        Objective   Vital Signs:  Vitals:    03/28/24 1416   BP: 150/90      Body mass index is 28.21 kg/m².       Physical Exam  Vitals reviewed.   Constitutional:       Appearance: Normal appearance.   HENT:      Head: Normocephalic.   Cardiovascular:      Rate and Rhythm: Normal rate and regular rhythm.      Pulses: Normal pulses.           Dorsalis pedis pulses are detected w/ Doppler on the right side and detected w/  Doppler on the left side.        Posterior tibial pulses are detected w/ Doppler on the right side and detected w/ Doppler on the left side.   Pulmonary:      Effort: Pulmonary effort is normal.   Skin:     General: Skin is warm.          Neurological:      General: No focal deficit present.      Mental Status: He is alert and oriented to person, place, and time.   Psychiatric:         Mood and Affect: Mood normal.          Result Review :  Duplex Carotid Ultrasound CAR (03/28/2024 13:56)   Doppler Ankle Brachial Index Single Level CAR (03/28/2024 13:23)     Previous carotid duplex: <50% stenosis bilaterally.     Carotid duplex from today: <50% stenosis on the right and normal velocities on the left    Previous ABIs were 0.88 on the right and 0.94 on the left.      Most Recent A1C          10/3/2023    12:45   HGBA1C Most Recent   Hemoglobin A1C 8.90                   Assessment and Plan     Diagnoses and all orders for this visit:    1. Bilateral carotid artery stenosis (Primary)    2. Peripheral arterial disease  -     Doppler Ankle Brachial Index Single Level CAR; Future    3. Carotid stenosis, bilateral  -     Duplex Carotid Ultrasound CAR; Future    4. Type 2 diabetes mellitus with hyperglycemia, with long-term current use of insulin    5. Primary hypertension             Patient has stable carotid artery stenosis. He is to continue his antiplatelet agent which is aspirin. He is not on a statin for cholesterol control.  He is a diabetic and his most recent A1c was 8.9%.  We discussed adequate blood pressure and blood sugar control to prevent progressive peripheral arterial disease. We also discussed diligent foot inspection and continued podiatry follow up. He will return in 1 year along with a repeat carotid artery duplex and ABIs.    Follow Up     Return in about 1 year (around 3/28/2025) for carotid duplex, ramez.  Patient was given instructions and counseling regarding his condition or for health maintenance  advice. Please see specific information pulled into the AVS if appropriate.     LEIDY Acuna

## 2024-04-17 ENCOUNTER — TELEPHONE (OUTPATIENT)
Dept: CARDIOLOGY | Facility: CLINIC | Age: 74
End: 2024-04-17

## 2024-04-17 NOTE — TELEPHONE ENCOUNTER
The [facility] received a fax that requires your attention. The document has been indexed to the patient’s chart for your review.      Reason for sending: EXTERNAL MEDICAL RECORD NOTIFICATION     Documents Description: PN-[facility]-4.5.24    Name of Sender: [facility]     Date Indexed: 4.5.24

## 2024-04-22 NOTE — TELEPHONE ENCOUNTER
Date and time of phone call: 4/22/2024 at 1401    SW contacted patient due to recent ED visits and ISAR score of 3. Patient states they are doing well following ED visit. SW reminded patient of upcoming appointments. Patient unaware of appointments. SW attempted to provide patient with numbers for providers, patient stating no way to write down numbers at this time. SW advised patient to reach out to [facility] scheduling to reschedule appointments if needed.    pt informed. He hasnt slept, maybe 2 hours of sleep a day.

## 2024-05-27 NOTE — OUTREACH NOTE
"Patient Outreach Note  Spoke with patient's wife, and states was transferred a week ago to [facility] for rehab per their preference to be closer to their home.  Plans for discharge this Saturday with outpatient physical therapy to continue at [facility].  Has follow up CT on Monday for his \"lungs.\"  States weight stable at 183 pounds. HF education with daily weights and now has borrowed a scale which patient will be able to balance himself and provide more accurate readings.  Discussed parameters of weight gain and when to contact cardiologist and states the cardiologist has already advised an extra dose of diuretic for a weight gain parameter.  Discussed use of BiPAP adherence and benefits of compliance with controlling exacerbations and voices understanding.  Feels he is sleeping better with the use of Tramadol at the facility and hopeful that his with his improved sleep pattern that he will be better able to tolerate the BiPAP.  Voiced concerns related to blood sugars just now being better controlled around 253.  Routinely follow with his [facility] endocrinologist and last HgbA1c with him was  7.2,  She is worried the changes in his regimen from the hospital and rehab facility will affect his future HgbA1c.  States plans for follow-up with ortho, Dr. Candelaria and Dr. Ravi due to his failed hip fx healing.  States the rehab facility is currently weaning him off O2 prior to his discharge home on Saturday.  Advised to make sure she receives discharge instructions and medication regimen with quantity sufficient  number of medications with the transition to home.  Voices understanding.  Denies further needs or concerns today.     Johan Dillard RN  Ambulatory     5/13/2021, 14:36 EDT      " No

## 2024-05-30 ENCOUNTER — APPOINTMENT (OUTPATIENT)
Dept: CT IMAGING | Facility: HOSPITAL | Age: 74
End: 2024-05-30
Payer: MEDICARE

## 2024-05-30 ENCOUNTER — HOSPITAL ENCOUNTER (INPATIENT)
Facility: HOSPITAL | Age: 74
LOS: 26 days | Discharge: HOME | End: 2024-06-25
Attending: EMERGENCY MEDICINE | Admitting: HOSPITALIST
Payer: MEDICARE

## 2024-05-30 ENCOUNTER — APPOINTMENT (OUTPATIENT)
Dept: GENERAL RADIOLOGY | Facility: HOSPITAL | Age: 74
End: 2024-05-30
Payer: MEDICARE

## 2024-05-30 DIAGNOSIS — J96.01 ACUTE RESPIRATORY FAILURE WITH HYPOXIA: Primary | ICD-10-CM

## 2024-05-30 DIAGNOSIS — E87.5 HYPERKALEMIA: ICD-10-CM

## 2024-05-30 DIAGNOSIS — N39.0 ACUTE UTI: ICD-10-CM

## 2024-05-30 DIAGNOSIS — A41.9 SEPSIS WITHOUT ACUTE ORGAN DYSFUNCTION, DUE TO UNSPECIFIED ORGANISM: ICD-10-CM

## 2024-05-30 DIAGNOSIS — G93.41 METABOLIC ENCEPHALOPATHY: ICD-10-CM

## 2024-05-30 DIAGNOSIS — E11.65 HYPERGLYCEMIA DUE TO DIABETES MELLITUS: ICD-10-CM

## 2024-05-30 DIAGNOSIS — N39.0 ACUTE UTI (URINARY TRACT INFECTION): ICD-10-CM

## 2024-05-30 DIAGNOSIS — I50.32 CHRONIC HEART FAILURE WITH PRESERVED EJECTION FRACTION (HFPEF): ICD-10-CM

## 2024-05-30 LAB
ALBUMIN SERPL-MCNC: 4.2 G/DL (ref 3.5–5.2)
ALBUMIN/GLOB SERPL: 1.3 G/DL
ALP SERPL-CCNC: 86 U/L (ref 39–117)
ALT SERPL W P-5'-P-CCNC: 31 U/L (ref 1–41)
AMMONIA BLD-SCNC: 20 UMOL/L (ref 16–60)
ANION GAP SERPL CALCULATED.3IONS-SCNC: 10.8 MMOL/L (ref 5–15)
ANION GAP SERPL CALCULATED.3IONS-SCNC: 8.4 MMOL/L (ref 5–15)
APTT PPP: 28 SECONDS (ref 22.7–35.4)
ARTERIAL PATENCY WRIST A: ABNORMAL
AST SERPL-CCNC: 33 U/L (ref 1–40)
ATMOSPHERIC PRESS: 752.9 MMHG
BACTERIA UR QL AUTO: ABNORMAL /HPF
BASE EXCESS BLDA CALC-SCNC: 3.3 MMOL/L (ref 0–2)
BASOPHILS # BLD AUTO: 0.06 10*3/MM3 (ref 0–0.2)
BASOPHILS NFR BLD AUTO: 0.2 % (ref 0–1.5)
BDY SITE: ABNORMAL
BILIRUB SERPL-MCNC: 1.3 MG/DL (ref 0–1.2)
BILIRUB UR QL STRIP: NEGATIVE
BUN SERPL-MCNC: 22 MG/DL (ref 8–23)
BUN SERPL-MCNC: 24 MG/DL (ref 8–23)
BUN/CREAT SERPL: 20.5 (ref 7–25)
BUN/CREAT SERPL: 24.4 (ref 7–25)
CALCIUM SPEC-SCNC: 7.3 MG/DL (ref 8.6–10.5)
CALCIUM SPEC-SCNC: 9.6 MG/DL (ref 8.6–10.5)
CHLORIDE SERPL-SCNC: 107 MMOL/L (ref 98–107)
CHLORIDE SERPL-SCNC: 98 MMOL/L (ref 98–107)
CLARITY UR: ABNORMAL
CO2 BLDA-SCNC: 29.9 MMOL/L (ref 23–27)
CO2 SERPL-SCNC: 24.6 MMOL/L (ref 22–29)
CO2 SERPL-SCNC: 29.2 MMOL/L (ref 22–29)
COLOR UR: ABNORMAL
CREAT SERPL-MCNC: 0.9 MG/DL (ref 0.76–1.27)
CREAT SERPL-MCNC: 1.17 MG/DL (ref 0.76–1.27)
D DIMER PPP FEU-MCNC: 0.57 MCGFEU/ML (ref 0–0.75)
D-LACTATE SERPL-SCNC: 2 MMOL/L (ref 0.5–2)
D-LACTATE SERPL-SCNC: 2.2 MMOL/L (ref 0.5–2)
DEPRECATED RDW RBC AUTO: 46.3 FL (ref 37–54)
DEVICE COMMENT: ABNORMAL
EGFRCR SERPLBLD CKD-EPI 2021: 65 ML/MIN/1.73
EGFRCR SERPLBLD CKD-EPI 2021: 89.1 ML/MIN/1.73
EOSINOPHIL # BLD AUTO: 0 10*3/MM3 (ref 0–0.4)
EOSINOPHIL NFR BLD AUTO: 0 % (ref 0.3–6.2)
ERYTHROCYTE [DISTWIDTH] IN BLOOD BY AUTOMATED COUNT: 13 % (ref 12.3–15.4)
GAS FLOW AIRWAY: 3.5 LPM
GEN 5 2HR TROPONIN T REFLEX: 38 NG/L
GLOBULIN UR ELPH-MCNC: 3.2 GM/DL
GLUCOSE BLDC GLUCOMTR-MCNC: 164 MG/DL (ref 70–130)
GLUCOSE BLDC GLUCOMTR-MCNC: 228 MG/DL (ref 70–130)
GLUCOSE BLDC GLUCOMTR-MCNC: 244 MG/DL (ref 70–130)
GLUCOSE BLDC GLUCOMTR-MCNC: 264 MG/DL (ref 70–130)
GLUCOSE BLDC GLUCOMTR-MCNC: 302 MG/DL (ref 70–130)
GLUCOSE SERPL-MCNC: 228 MG/DL (ref 65–99)
GLUCOSE SERPL-MCNC: 306 MG/DL (ref 65–99)
GLUCOSE UR STRIP-MCNC: ABNORMAL MG/DL
HCO3 BLDA-SCNC: 28.5 MMOL/L (ref 22–28)
HCT VFR BLD AUTO: 50.4 % (ref 37.5–51)
HEMODILUTION: NO
HGB BLD-MCNC: 16.4 G/DL (ref 13–17.7)
HGB UR QL STRIP.AUTO: ABNORMAL
HOLD SPECIMEN: NORMAL
HYALINE CASTS UR QL AUTO: ABNORMAL /LPF
IMM GRANULOCYTES # BLD AUTO: 0.31 10*3/MM3 (ref 0–0.05)
IMM GRANULOCYTES NFR BLD AUTO: 1 % (ref 0–0.5)
INR PPP: 1.07 (ref 0.9–1.1)
KETONES UR QL STRIP: ABNORMAL
LEUKOCYTE ESTERASE UR QL STRIP.AUTO: ABNORMAL
LYMPHOCYTES # BLD AUTO: 0.52 10*3/MM3 (ref 0.7–3.1)
LYMPHOCYTES NFR BLD AUTO: 1.7 % (ref 19.6–45.3)
MAGNESIUM SERPL-MCNC: 1.9 MG/DL (ref 1.6–2.4)
MCH RBC QN AUTO: 31 PG (ref 26.6–33)
MCHC RBC AUTO-ENTMCNC: 32.5 G/DL (ref 31.5–35.7)
MCV RBC AUTO: 95.3 FL (ref 79–97)
MODALITY: ABNORMAL
MONOCYTES # BLD AUTO: 1.93 10*3/MM3 (ref 0.1–0.9)
MONOCYTES NFR BLD AUTO: 6.4 % (ref 5–12)
NEUTROPHILS NFR BLD AUTO: 27.54 10*3/MM3 (ref 1.7–7)
NEUTROPHILS NFR BLD AUTO: 90.7 % (ref 42.7–76)
NITRITE UR QL STRIP: POSITIVE
NT-PROBNP SERPL-MCNC: 2034 PG/ML (ref 0–1800)
PCO2 BLDA: 44 MM HG (ref 35–45)
PH BLDA: 7.42 PH UNITS (ref 7.35–7.45)
PH UR STRIP.AUTO: 6 [PH] (ref 5–8)
PLATELET # BLD AUTO: 230 10*3/MM3 (ref 140–450)
PMV BLD AUTO: 11 FL (ref 6–12)
PO2 BLDA: 56.9 MM HG (ref 80–100)
POTASSIUM SERPL-SCNC: 4 MMOL/L (ref 3.5–5.2)
POTASSIUM SERPL-SCNC: 5.5 MMOL/L (ref 3.5–5.2)
PROCALCITONIN SERPL-MCNC: 0.95 NG/ML (ref 0–0.25)
PROT SERPL-MCNC: 7.4 G/DL (ref 6–8.5)
PROT UR QL STRIP: ABNORMAL
PROTHROMBIN TIME: 14.1 SECONDS (ref 11.7–14.2)
RBC # BLD AUTO: 5.29 10*6/MM3 (ref 4.14–5.8)
RBC # UR STRIP: ABNORMAL /HPF
REF LAB TEST METHOD: ABNORMAL
SAO2 % BLDCOA: 89.5 % (ref 92–98.5)
SODIUM SERPL-SCNC: 138 MMOL/L (ref 136–145)
SODIUM SERPL-SCNC: 140 MMOL/L (ref 136–145)
SP GR UR STRIP: 1.02 (ref 1–1.03)
SQUAMOUS #/AREA URNS HPF: ABNORMAL /HPF
TOTAL RATE: 18 BREATHS/MINUTE
TROPONIN T DELTA: -2 NG/L
TROPONIN T SERPL HS-MCNC: 40 NG/L
UROBILINOGEN UR QL STRIP: ABNORMAL
WBC # UR STRIP: ABNORMAL /HPF
WBC NRBC COR # BLD AUTO: 30.36 10*3/MM3 (ref 3.4–10.8)

## 2024-05-30 PROCEDURE — 82803 BLOOD GASES ANY COMBINATION: CPT | Performed by: PHYSICIAN ASSISTANT

## 2024-05-30 PROCEDURE — 36415 COLL VENOUS BLD VENIPUNCTURE: CPT

## 2024-05-30 PROCEDURE — 71260 CT THORAX DX C+: CPT

## 2024-05-30 PROCEDURE — 70450 CT HEAD/BRAIN W/O DYE: CPT

## 2024-05-30 PROCEDURE — 87077 CULTURE AEROBIC IDENTIFY: CPT | Performed by: PHYSICIAN ASSISTANT

## 2024-05-30 PROCEDURE — 25810000003 SODIUM CHLORIDE 0.9 % SOLUTION: Performed by: PHYSICIAN ASSISTANT

## 2024-05-30 PROCEDURE — 25010000002 VANCOMYCIN 10 G RECONSTITUTED SOLUTION: Performed by: PHYSICIAN ASSISTANT

## 2024-05-30 PROCEDURE — 80053 COMPREHEN METABOLIC PANEL: CPT | Performed by: PHYSICIAN ASSISTANT

## 2024-05-30 PROCEDURE — 85379 FIBRIN DEGRADATION QUANT: CPT | Performed by: PHYSICIAN ASSISTANT

## 2024-05-30 PROCEDURE — 83605 ASSAY OF LACTIC ACID: CPT | Performed by: PHYSICIAN ASSISTANT

## 2024-05-30 PROCEDURE — 82948 REAGENT STRIP/BLOOD GLUCOSE: CPT

## 2024-05-30 PROCEDURE — 71045 X-RAY EXAM CHEST 1 VIEW: CPT

## 2024-05-30 PROCEDURE — 25010000002 CEFTRIAXONE PER 250 MG: Performed by: INTERNAL MEDICINE

## 2024-05-30 PROCEDURE — 87040 BLOOD CULTURE FOR BACTERIA: CPT | Performed by: PHYSICIAN ASSISTANT

## 2024-05-30 PROCEDURE — 81001 URINALYSIS AUTO W/SCOPE: CPT | Performed by: PHYSICIAN ASSISTANT

## 2024-05-30 PROCEDURE — 84484 ASSAY OF TROPONIN QUANT: CPT | Performed by: PHYSICIAN ASSISTANT

## 2024-05-30 PROCEDURE — 36600 WITHDRAWAL OF ARTERIAL BLOOD: CPT | Performed by: PHYSICIAN ASSISTANT

## 2024-05-30 PROCEDURE — 63710000001 INSULIN LISPRO (HUMAN) PER 5 UNITS: Performed by: INTERNAL MEDICINE

## 2024-05-30 PROCEDURE — 25010000002 OLANZAPINE 10 MG RECONSTITUTED SOLUTION: Performed by: INTERNAL MEDICINE

## 2024-05-30 PROCEDURE — 87186 SC STD MICRODIL/AGAR DIL: CPT | Performed by: PHYSICIAN ASSISTANT

## 2024-05-30 PROCEDURE — 85730 THROMBOPLASTIN TIME PARTIAL: CPT | Performed by: PHYSICIAN ASSISTANT

## 2024-05-30 PROCEDURE — 85610 PROTHROMBIN TIME: CPT | Performed by: PHYSICIAN ASSISTANT

## 2024-05-30 PROCEDURE — 93010 ELECTROCARDIOGRAM REPORT: CPT | Performed by: INTERNAL MEDICINE

## 2024-05-30 PROCEDURE — 87086 URINE CULTURE/COLONY COUNT: CPT | Performed by: PHYSICIAN ASSISTANT

## 2024-05-30 PROCEDURE — 25810000003 SODIUM CHLORIDE 0.9 % SOLUTION: Performed by: INTERNAL MEDICINE

## 2024-05-30 PROCEDURE — 83735 ASSAY OF MAGNESIUM: CPT | Performed by: PHYSICIAN ASSISTANT

## 2024-05-30 PROCEDURE — 83880 ASSAY OF NATRIURETIC PEPTIDE: CPT | Performed by: PHYSICIAN ASSISTANT

## 2024-05-30 PROCEDURE — 85025 COMPLETE CBC W/AUTO DIFF WBC: CPT | Performed by: PHYSICIAN ASSISTANT

## 2024-05-30 PROCEDURE — 25510000001 IOPAMIDOL 61 % SOLUTION: Performed by: EMERGENCY MEDICINE

## 2024-05-30 PROCEDURE — 74177 CT ABD & PELVIS W/CONTRAST: CPT

## 2024-05-30 PROCEDURE — 25010000002 PIPERACILLIN SOD-TAZOBACTAM PER 1 G: Performed by: PHYSICIAN ASSISTANT

## 2024-05-30 PROCEDURE — 93005 ELECTROCARDIOGRAM TRACING: CPT | Performed by: PHYSICIAN ASSISTANT

## 2024-05-30 PROCEDURE — 82140 ASSAY OF AMMONIA: CPT | Performed by: PHYSICIAN ASSISTANT

## 2024-05-30 PROCEDURE — 63710000001 INSULIN REGULAR HUMAN PER 5 UNITS: Performed by: PHYSICIAN ASSISTANT

## 2024-05-30 PROCEDURE — 63710000001 INSULIN GLARGINE PER 5 UNITS: Performed by: INTERNAL MEDICINE

## 2024-05-30 PROCEDURE — 99291 CRITICAL CARE FIRST HOUR: CPT

## 2024-05-30 PROCEDURE — 84145 PROCALCITONIN (PCT): CPT | Performed by: PHYSICIAN ASSISTANT

## 2024-05-30 PROCEDURE — 25010000002 ENOXAPARIN PER 10 MG: Performed by: INTERNAL MEDICINE

## 2024-05-30 RX ORDER — ASPIRIN 81 MG/1
81 TABLET ORAL DAILY
Status: DISCONTINUED | OUTPATIENT
Start: 2024-05-30 | End: 2024-06-12

## 2024-05-30 RX ORDER — SILDENAFIL CITRATE 20 MG/1
20 TABLET ORAL AS NEEDED
COMMUNITY
End: 2024-07-01 | Stop reason: HOSPADM

## 2024-05-30 RX ORDER — BRIMONIDINE TARTRATE 2 MG/ML
1 SOLUTION/ DROPS OPHTHALMIC 2 TIMES DAILY
Status: DISCONTINUED | OUTPATIENT
Start: 2024-05-30 | End: 2024-06-25 | Stop reason: HOSPADM

## 2024-05-30 RX ORDER — ENOXAPARIN SODIUM 100 MG/ML
40 INJECTION SUBCUTANEOUS DAILY
Status: DISCONTINUED | OUTPATIENT
Start: 2024-05-30 | End: 2024-06-04

## 2024-05-30 RX ORDER — SODIUM CHLORIDE 9 MG/ML
40 INJECTION, SOLUTION INTRAVENOUS AS NEEDED
Status: DISCONTINUED | OUTPATIENT
Start: 2024-05-30 | End: 2024-06-25 | Stop reason: HOSPADM

## 2024-05-30 RX ORDER — ACETAMINOPHEN 650 MG/1
650 SUPPOSITORY RECTAL EVERY 4 HOURS PRN
Status: DISCONTINUED | OUTPATIENT
Start: 2024-05-30 | End: 2024-06-25 | Stop reason: HOSPADM

## 2024-05-30 RX ORDER — OLANZAPINE 5 MG/1
5 TABLET ORAL NIGHTLY
Status: DISCONTINUED | OUTPATIENT
Start: 2024-05-30 | End: 2024-06-25 | Stop reason: HOSPADM

## 2024-05-30 RX ORDER — SODIUM CHLORIDE 0.9 % (FLUSH) 0.9 %
10 SYRINGE (ML) INJECTION AS NEEDED
Status: DISCONTINUED | OUTPATIENT
Start: 2024-05-30 | End: 2024-06-25 | Stop reason: HOSPADM

## 2024-05-30 RX ORDER — IBUPROFEN 600 MG/1
1 TABLET ORAL
Status: DISCONTINUED | OUTPATIENT
Start: 2024-05-30 | End: 2024-06-25 | Stop reason: HOSPADM

## 2024-05-30 RX ORDER — INSULIN LISPRO 100 [IU]/ML
2-9 INJECTION, SOLUTION INTRAVENOUS; SUBCUTANEOUS
Status: DISCONTINUED | OUTPATIENT
Start: 2024-05-30 | End: 2024-06-25 | Stop reason: HOSPADM

## 2024-05-30 RX ORDER — AMOXICILLIN 250 MG
2 CAPSULE ORAL 2 TIMES DAILY
Status: DISCONTINUED | OUTPATIENT
Start: 2024-05-30 | End: 2024-06-25 | Stop reason: HOSPADM

## 2024-05-30 RX ORDER — DULOXETIN HYDROCHLORIDE 30 MG/1
30 CAPSULE, DELAYED RELEASE ORAL DAILY
Status: DISCONTINUED | OUTPATIENT
Start: 2024-05-30 | End: 2024-06-25 | Stop reason: HOSPADM

## 2024-05-30 RX ORDER — POLYETHYLENE GLYCOL 3350 17 G/17G
17 POWDER, FOR SOLUTION ORAL DAILY PRN
Status: DISCONTINUED | OUTPATIENT
Start: 2024-05-30 | End: 2024-06-25 | Stop reason: HOSPADM

## 2024-05-30 RX ORDER — BISACODYL 10 MG
10 SUPPOSITORY, RECTAL RECTAL DAILY PRN
Status: DISCONTINUED | OUTPATIENT
Start: 2024-05-30 | End: 2024-06-25 | Stop reason: HOSPADM

## 2024-05-30 RX ORDER — LATANOPROST 50 UG/ML
1 SOLUTION/ DROPS OPHTHALMIC NIGHTLY
Status: DISCONTINUED | OUTPATIENT
Start: 2024-05-30 | End: 2024-06-25 | Stop reason: HOSPADM

## 2024-05-30 RX ORDER — ACETAMINOPHEN 325 MG/1
650 TABLET ORAL EVERY 4 HOURS PRN
Status: DISCONTINUED | OUTPATIENT
Start: 2024-05-30 | End: 2024-06-25 | Stop reason: HOSPADM

## 2024-05-30 RX ORDER — SODIUM CHLORIDE 9 MG/ML
75 INJECTION, SOLUTION INTRAVENOUS CONTINUOUS
Status: DISCONTINUED | OUTPATIENT
Start: 2024-05-30 | End: 2024-06-01

## 2024-05-30 RX ORDER — NICOTINE POLACRILEX 4 MG
15 LOZENGE BUCCAL
Status: DISCONTINUED | OUTPATIENT
Start: 2024-05-30 | End: 2024-06-25 | Stop reason: HOSPADM

## 2024-05-30 RX ORDER — NITROGLYCERIN 0.4 MG/1
0.4 TABLET SUBLINGUAL
Status: DISCONTINUED | OUTPATIENT
Start: 2024-05-30 | End: 2024-06-25 | Stop reason: HOSPADM

## 2024-05-30 RX ORDER — DEXTROSE MONOHYDRATE 25 G/50ML
25 INJECTION, SOLUTION INTRAVENOUS
Status: DISCONTINUED | OUTPATIENT
Start: 2024-05-30 | End: 2024-06-25 | Stop reason: HOSPADM

## 2024-05-30 RX ORDER — OLANZAPINE 10 MG/2ML
10 INJECTION, POWDER, FOR SOLUTION INTRAMUSCULAR EVERY 8 HOURS PRN
Status: DISCONTINUED | OUTPATIENT
Start: 2024-05-30 | End: 2024-06-25 | Stop reason: HOSPADM

## 2024-05-30 RX ORDER — SODIUM CHLORIDE 0.9 % (FLUSH) 0.9 %
10 SYRINGE (ML) INJECTION EVERY 12 HOURS SCHEDULED
Status: DISCONTINUED | OUTPATIENT
Start: 2024-05-30 | End: 2024-06-25 | Stop reason: HOSPADM

## 2024-05-30 RX ORDER — ACETAMINOPHEN 160 MG/5ML
650 SOLUTION ORAL EVERY 4 HOURS PRN
Status: DISCONTINUED | OUTPATIENT
Start: 2024-05-30 | End: 2024-06-25 | Stop reason: HOSPADM

## 2024-05-30 RX ORDER — ONDANSETRON 2 MG/ML
4 INJECTION INTRAMUSCULAR; INTRAVENOUS EVERY 6 HOURS PRN
Status: DISCONTINUED | OUTPATIENT
Start: 2024-05-30 | End: 2024-06-25 | Stop reason: HOSPADM

## 2024-05-30 RX ORDER — BISACODYL 5 MG/1
5 TABLET, DELAYED RELEASE ORAL DAILY PRN
Status: DISCONTINUED | OUTPATIENT
Start: 2024-05-30 | End: 2024-06-25 | Stop reason: HOSPADM

## 2024-05-30 RX ADMIN — INSULIN LISPRO 7 UNITS: 100 INJECTION, SOLUTION INTRAVENOUS; SUBCUTANEOUS at 18:26

## 2024-05-30 RX ADMIN — Medication 10 ML: at 11:43

## 2024-05-30 RX ADMIN — OLANZAPINE 10 MG: 10 INJECTION, POWDER, LYOPHILIZED, FOR SOLUTION INTRAMUSCULAR at 13:58

## 2024-05-30 RX ADMIN — INSULIN GLARGINE 24 UNITS: 100 INJECTION, SOLUTION SUBCUTANEOUS at 13:58

## 2024-05-30 RX ADMIN — SODIUM CHLORIDE 125 ML/HR: 9 INJECTION, SOLUTION INTRAVENOUS at 11:43

## 2024-05-30 RX ADMIN — BRIMONIDINE TARTRATE 1 DROP: 2 SOLUTION OPHTHALMIC at 20:22

## 2024-05-30 RX ADMIN — INSULIN LISPRO 4 UNITS: 100 INJECTION, SOLUTION INTRAVENOUS; SUBCUTANEOUS at 13:57

## 2024-05-30 RX ADMIN — PIPERACILLIN AND TAZOBACTAM 3.38 G: 3; .375 INJECTION, POWDER, FOR SOLUTION INTRAVENOUS at 05:04

## 2024-05-30 RX ADMIN — SODIUM CHLORIDE 1000 ML: 9 INJECTION, SOLUTION INTRAVENOUS at 05:08

## 2024-05-30 RX ADMIN — SODIUM CHLORIDE 125 ML/HR: 9 INJECTION, SOLUTION INTRAVENOUS at 20:20

## 2024-05-30 RX ADMIN — ASPIRIN 81 MG: 81 TABLET, COATED ORAL at 20:21

## 2024-05-30 RX ADMIN — ENOXAPARIN SODIUM 40 MG: 100 INJECTION SUBCUTANEOUS at 11:36

## 2024-05-30 RX ADMIN — ACETAMINOPHEN 325MG 650 MG: 325 TABLET ORAL at 20:21

## 2024-05-30 RX ADMIN — CEFTRIAXONE 2000 MG: 2 INJECTION, POWDER, FOR SOLUTION INTRAMUSCULAR; INTRAVENOUS at 11:36

## 2024-05-30 RX ADMIN — INSULIN HUMAN 5 UNITS: 100 INJECTION, SOLUTION PARENTERAL at 06:35

## 2024-05-30 RX ADMIN — IOPAMIDOL 100 ML: 612 INJECTION, SOLUTION INTRAVENOUS at 05:47

## 2024-05-30 RX ADMIN — DULOXETINE HYDROCHLORIDE 30 MG: 30 CAPSULE, DELAYED RELEASE ORAL at 20:21

## 2024-05-30 RX ADMIN — SENNOSIDES AND DOCUSATE SODIUM 2 TABLET: 50; 8.6 TABLET ORAL at 20:21

## 2024-05-30 RX ADMIN — OLANZAPINE 5 MG: 5 TABLET, FILM COATED ORAL at 20:21

## 2024-05-30 RX ADMIN — BRIMONIDINE TARTRATE 1 DROP: 2 SOLUTION OPHTHALMIC at 13:58

## 2024-05-30 RX ADMIN — VANCOMYCIN HYDROCHLORIDE 1750 MG: 10 INJECTION, POWDER, LYOPHILIZED, FOR SOLUTION INTRAVENOUS at 06:06

## 2024-05-30 NOTE — H&P
[facility] Admission H&P    Patient Care Team:  Fernando Camargo DO as PCP - General (Family Medicine)  Morris Cai MD as Consulting Physician (Sleep Medicine)  Michaela Hylton MD as Consulting Physician (Cardiology)  Hardy Banerjee MD as Consulting Physician (Ophthalmology)  Chula Christianson MD as Consulting Physician (Ophthalmology)  Jason Sherman MD (Endocrinology)  Perla Mayen MD as Consulting Physician (Nephrology)  Federico Hebert MD as Consulting Physician (Pulmonary Disease)  Niraj Ravi MD as Consulting Physician (Orthopedic Surgery)  Papa Velasco MD as Consulting Physician (Urology)  Terese Yost MD as Consulting Physician (Gastroenterology)  Aracelis Ball MD as Consulting Physician (Colon and Rectal Surgery)  [facility] as Primary Care Provider    Chief complaint: Confusion, hallucinations, restlessness, dysuria    History of Present Illness    This is a 74-year-old male with history of diabetes, hypertension, CAD, diastolic heart failure, chronic kidney disease stage III who presented to the emergency room with the above-stated complaints.  He states he has been having dysuria for the past couple of days.  Yesterday his wife noticed the confusion with restlessness and hallucinations.  Workup in the emergency room was concerning for sepsis secondary to urinary tract infection.  He was given broad-spectrum antibiotics, IV fluids, and his wife states that he is already starting to show improvement cognitively.  I have been asked to admit him for further care.    Past Medical History:   Diagnosis Date    AMENA (acute kidney injury) 12/03/2020    Alcoholism 1989    not since 1998    CORI positive     Anemia     Anxiety     Ataxia     Atypical chest pain 04/19/2022    SEEN AT [facility]    Balance disorder     Carotid stenosis 3/28/2024    Cataract     BILATERAL, S/P EXTRACTION    Cervical radiculopathy 11/11/2019    SEEN AT  [facility] ER    Cervical spinal cord  "compression     Chronic diastolic (congestive) heart failure     Chronic kidney disease     STAGE 3, FOLLOWED BY DR. VIVAR    Chronic pancreatitis     Closed left subtrochanteric femur fracture 12/01/2020    ADMITTED TO [facility]    Closed nondisplaced intertrochanteric fracture of left femur 12/01/2020    ADMITTED TO [facility]    Colon polyps     FOLLOWED BY DR. AVTAR JEONG    Constipation     Contracture, right hand     Coronary artery disease     CABG 7/2019    COVID-19 07/2022    DDD (degenerative disc disease), cervical     Diabetes mellitus, type II     IDDM    Diabetic retinopathy     Difficulty walking     Dysphagia     Elevated brain natriuretic peptide (BNP) level 08/2014    Elevated LFTs 03/2021    Erectile dysfunction     Fissure, anal 2022    Foot drop     Fuchs' corneal dystrophy of right eye     Glaucoma     BILATERAL    History of alcohol abuse     Hyperlipidemia     Hypersomnia     Hypertension     Hypertensive urgency 02/25/2020    ADMITTED TO [facility]    Insomnia     Kidney stones     LV dysfunction 06/2016    Lyme disease     Lymphadenopathy syndrome 05/2021    Macular edema     BILATERAL    Myocardial infarction 11/05/2019    NSTEMI, ADMITTED TO [facility]    Myocardial infarction 07/12/2019    NSTEMI, ADMITTED TO [facility]    Neuropathy in diabetes     Non-celiac gluten sensitivity     Orthostasis     Osteoporosis     Oxygen dependent     PAD (peripheral artery disease)     PNA (pneumonia) 06/2016    LEFT LOBE    Polyneuropathy     PTSD (post-traumatic stress disorder)     Pulmonary hypertension     Pulmonary nodule     Senile ectropion of both lower eyelids 02/2022    Sepsis 12/16/2020    D/T UTI, ADMITTED TO [facility]    Sleep apnea     STATES DOESN'T USE BIPAP OR CPAP    Spinal stenosis in cervical region     SEVERE-LIMITED ROM    Stroke 2012    \"slight stroke\"    Syncope and collapse 07/06/2019    ADMITTED TO [facility]    Urinary retention 04/05/2021    SEEN AT [facility] ER    Vision loss     Vitamin D deficiency      Past Surgical " History:   Procedure Laterality Date    CARDIAC CATHETERIZATION N/A 07/15/2019    Procedure: Coronary angiography;  Surgeon: Carrie Price MD;  Location:  [facility];  Service: Cardiovascular    CARDIAC CATHETERIZATION N/A 07/15/2019    Procedure: Left Heart Cath;  Surgeon: Carrie Price MD;  Location:  [facility];  Service: Cardiovascular    CARDIAC CATHETERIZATION N/A 07/15/2019    Procedure: Left ventriculography;  Surgeon: Carrie Price MD;  Location:  [facility];  Service: Cardiovascular    CARDIAC CATHETERIZATION  07/15/2019    Procedure: Functional Flow [place];  Surgeon: Carrie Price MD;  Location:  [facility];  Service: Cardiovascular    CARDIAC CATHETERIZATION N/A 11/06/2019    Procedure: Right and Left Heart Cath;  Surgeon: Mya Smith MD;  Location:  [facility];  Service: Cardiovascular    CARDIAC CATHETERIZATION N/A 11/06/2019    Procedure: Coronary angiography;  Surgeon: Mya Smith MD;  Location:  [facility];  Service: Cardiovascular    CARDIAC SURGERY      CATARACT EXTRACTION Left 2014    CATARACT EXTRACTION Right 11/2016    PHACO/IOL, DR. LEN DENTON    COLONOSCOPY N/A 03/16/2023    ENTIRE COLON WNL, RESCOPE IN 5 YRS, DR. LINDA LIZARRAGA AT [facility]    COLONOSCOPY W/ POLYPECTOMY N/A 01/02/2015    A FEW DIVERTICULA IN SIGMOID, 6 MM TUBULOVILLOUS ADENOMA POLYP IN RECTUM, SMALL HEMORRHOIDS, MELANOSIS COLI, DR. AVTAR JEONG AT [facility]    CORONARY ARTERY BYPASS GRAFT N/A 07/18/2019    Procedure: INTRAOPERATIVE SHOAIB; STERNOTOMY CORONARY ARTERY BYPASS x 3  USING LEFT INTERNAL MAMMARY ARTERY GRAFT UTILIZING ENDOSCOPICALLY HARVESTED RIGHT GREATER SAPHENOUS VEIN AND PRP.;  Surgeon: Bill Devi MD;  Location: [facility] MAIN OR;  Service: Cardiothoracic    CYSTOSCOPY BLADDER STONE LITHOTRIPSY N/A     DENTAL PROCEDURE Bilateral     3 surgeries inder implants    FEMUR IM  NAILING/RODDING Left 2020    Procedure: LEFT HIP INTRAMEDULLARY NAIL;  Surgeon: Niraj Ravi MD;  Location: [facility] MAIN OR;  Service: Orthopedic Spine;  Laterality: Left;    INCISION AND DRAINAGE PERIRECTAL ABSCESS N/A 10/04/2023    Procedure: Incision and drainage of perianal and buttock abscess;  Surgeon: Herbie Villatoro MD;  [facility];  Service: General;  Laterality: N/A;    INGUINAL HERNIA REPAIR Bilateral     TOENAIL EXCISION  2022    TONSILLECTOMY Bilateral     TOTAL HIP ARTHROPLASTY REVISION Left 2022    Procedure: TOTAL HIP ARTHROPLASTY REVISION- POSTERIOR;  Surgeon: Jurgen Candelaria II, MD;  Location: [facility] MAIN OR;  Service: Orthopedics;  Laterality: Left;    VASECTOMY N/A      Family History   Problem Relation Age of Onset    Heart disease Mother     Hypertension Mother     Hyperlipidemia Mother     Depression Mother     Cancer Mother         uterine    Arrhythmia Mother     Uterine cancer Mother     Mental illness Mother     Migraines Mother     Hyperlipidemia Father     Heart disease Father     Hypertension Father     Kidney disease Father     Thyroid disease Father     Heart failure Father     Depression Sister     Alcohol abuse Brother     Thyroid disease Paternal Uncle     Lung disease Paternal Uncle     Stroke Maternal Grandmother     Glaucoma Maternal Grandmother     Heart attack Paternal Grandmother     Stroke Paternal Grandmother     Alcohol abuse Paternal Grandfather     Malig Hyperthermia Neg Hx      Social History     Tobacco Use    Smoking status: Former     Current packs/day: 0.00     Average packs/day: 0.8 packs/day for 16.0 years (12.0 ttl pk-yrs)     Types: Cigarettes     Start date: 1984     Quit date: 2000     Years since quittin.4     Passive exposure: Past    Smokeless tobacco: Never    Tobacco comments:     Start age:39/Stopping age:47, 3/4 PPD   Vaping Use    Vaping status: Never Used   Substance Use Topics    Alcohol  use: Not Currently     Comment: none since     Drug use: No     (Not in a hospital admission)    Allergies:  Ace inhibitors, Angiotensin receptor blockers, Dapagliflozin, Losartan, Seroquel [quetiapine], Xanax [alprazolam], Amlodipine, Ativan [lorazepam], Baclofen, Misc. sulfonamide containing compounds, Minoxidil, and Tetracycline    Review of Systems   Constitutional:  Negative for chills and fever.   HENT:  Negative for congestion and sore throat.    Eyes:  Negative for visual disturbance.   Respiratory:  Negative for cough, chest tightness, shortness of breath and wheezing.    Cardiovascular:  Negative for chest pain, palpitations and leg swelling.   Gastrointestinal:  Positive for abdominal distention. Negative for abdominal pain, diarrhea, nausea and vomiting.   Endocrine: Negative for polydipsia and polyuria.   Genitourinary:  Negative for difficulty urinating, dysuria, frequency and urgency.   Musculoskeletal:  Negative for arthralgias and myalgias.   Skin:  Negative for color change and rash.   Neurological:  Negative for dizziness and light-headedness.   Psychiatric/Behavioral:  Positive for confusion and hallucinations.         PHYSICAL EXAM    Vital Signs  tMax 24 hrs:  Temp (24hrs), Av.5 °F (37.5 °C), Min:99.5 °F (37.5 °C), Max:99.5 °F (37.5 °C)    Vitals Ranges:  Temp:  [99.5 °F (37.5 °C)] 99.5 °F (37.5 °C)  Heart Rate:  [] 93  Resp:  [18] 18  BP: (105-158)/(55-82) 144/82    Physical Exam  Vitals and nursing note reviewed.   Constitutional:       General: He is not in acute distress.     Appearance: He is well-developed.      Comments: Somewhat chronically ill-appearing   HENT:      Head: Normocephalic and atraumatic.      Nose: Nose normal.      Mouth/Throat:      Mouth: Mucous membranes are not dry.   Eyes:      Conjunctiva/sclera: Conjunctivae normal.      Pupils: Pupils are equal, round, and reactive to light.   Neck:      Vascular: No JVD.   Cardiovascular:      Rate and Rhythm:  Normal rate and regular rhythm.      Heart sounds: No murmur heard.     No gallop.   Pulmonary:      Effort: Pulmonary effort is normal. No accessory muscle usage or respiratory distress.      Breath sounds: No decreased breath sounds or wheezing.   Abdominal:      General: Bowel sounds are normal. There is no distension.      Palpations: Abdomen is soft.      Tenderness: There is no abdominal tenderness. There is no guarding or rebound.   Musculoskeletal:         General: No deformity. Normal range of motion.      Cervical back: Normal range of motion and neck supple.      Right lower leg: Edema present.      Left lower leg: Edema present.   Lymphadenopathy:      Cervical: No cervical adenopathy.   Skin:     General: Skin is warm and dry.      Findings: No erythema or rash.   Neurological:      Mental Status: He is alert and oriented to person, place, and time.      Cranial Nerves: No cranial nerve deficit.         Results Review:  Results from last 7 days   Lab Units 05/30/24  0433   WBC 10*3/mm3 30.36*   HEMOGLOBIN g/dL 16.4   HEMATOCRIT % 50.4   PLATELETS 10*3/mm3 230     Results from last 7 days   Lab Units 05/30/24  0433   SODIUM mmol/L 138   POTASSIUM mmol/L 5.5*   CHLORIDE mmol/L 98   CO2 mmol/L 29.2*   BUN mg/dL 24*   CREATININE mg/dL 1.17   CALCIUM mg/dL 9.6   BILIRUBIN mg/dL 1.3*   ALK PHOS U/L 86   ALT (SGPT) U/L 31   AST (SGOT) U/L 33   GLUCOSE mg/dL 306*        I reviewed the patient's new clinical results.  I reviewed the patient's new imaging results and agree with the interpretation.        Active Hospital Problems    Diagnosis  POA    **Acute UTI (urinary tract infection) [N39.0]  Yes    Sepsis [A41.9]  Yes    Hyperkalemia [E87.5]  Unknown    Metabolic encephalopathy [G93.41]  Unknown    Peripheral arterial disease [I73.9]  Yes    History of stroke [Z86.73]  Not Applicable    CKD (chronic kidney disease) stage 3, GFR 30-59 ml/min [N18.30]  Yes    HTN (hypertension) [I10]  Yes    CSA (central sleep  apnea) [G47.31]  Yes    Chronic diastolic heart failure [I50.32]  Yes    Hx of CABG [Z95.1]  Not Applicable    CAD (coronary artery disease) [I25.10]  Yes    Type 2 diabetes mellitus with hyperglycemia, with long-term current use of insulin [E11.65, Z79.4]  Not Applicable      Resolved Hospital Problems   No resolved problems to display.       Assessment & Plan    The patient will be admitted.  Will initiate Rocephin for treatment of urinary tract infection.  He has signs of sepsis upon admission.  He was given a fluid bolus in the emergency room.  Blood pressure looks good at 140-150 systolic and he is no longer tachycardic.  Will start normal saline at 125 cc/h.  Lactic acid was only minimally elevated and now improved.  Leukocytosis of 30,000 is concerning.  I would not be surprised if he has a bacteremia.  Await blood and urine cultures.  CT scan showed evidence of cystitis but no infection higher up in the urinary tract or other complicating features.    Patient was hypoxic upon presentation with no complaints of shortness of breath or significant acute findings on chest CT.  Currently on 4 L and we will wean as tolerated.    Hyperkalemia was treated in the emergency room and I am awaiting a repeat BMP.  Will have a low threshold to involve his nephrologist pending further trend of renal function and electrolytes.  For the moment we will place his Bumex on hold.    Will reinitiate his outpatient regimen of long-acting insulin and start sliding scale.  He has been following closely with his endocrinologist as an outpatient.    Additional plans based on his clinical course.    I discussed the patients findings and my recommendations with patient and family      Jaquan Grossman MD  05/30/24  10:26 EDT

## 2024-05-30 NOTE — PLAN OF CARE
Problem: Adult Inpatient Plan of Care  Goal: Plan of Care Review  Flowsheets (Taken 5/30/2024 5801)  Progress: declining (Pended)  Plan of Care Reviewed With:   patient   spouse (Pended)  Outcome Evaluation: A&Ox1. o2 4L NS. NSR with PVCs. IV fluids and antibiotics. changes postions very frequently, attempted to get out of bed many times. PRN zyprexa given for agitation. Bed alarm on, call light within reach, family at bedside. (Pended)   Goal Outcome Evaluation:  Plan of Care Reviewed With: (P) patient, spouse        Progress: (P) declining  Outcome Evaluation: (P) A&Ox1. o2 4L NS. NSR with PVCs. IV fluids and antibiotics. changes postions very frequently, attempted to get out of bed many times. PRN zyprexa given for agitation. Bed alarm on, call light within reach, family at bedside.

## 2024-05-30 NOTE — ED PROVIDER NOTES
MD ATTESTATION NOTE     SHARED VISIT: This visit was performed by BOTH a physician and an APC. The substantive portion of the medical decision making was performed by this attesting physician who made or approved the management plan and takes responsibility for patient management. All studies in the APC note (if performed) were independently interpreted by me.  The GUZMAN and I have discussed this patient's history, physical exam, and treatment plan. I have reviewed the documentation and personally had a face to face interaction with the patient. I affirm the documentation and agree with the treatment and plan. I provided a substantive portion of the care of the patient.  I personally performed the physical exam in its entirety, and below are my findings.      Brief HPI: Patient presents to the ED from home with reports of altered mental status.  Wife reports that the patient became restless earlier this evening and was walking around the house.  He was sweaty and complained of feeling sick.  Patient denies cough, fever, chest pain, abdominal pain, nausea, vomiting, diarrhea, or dysuria.  He has a history of urinary tract infections.    PHYSICAL EXAM  ED Triage Vitals   Temp Heart Rate Resp BP SpO2   05/30/24 0350 05/30/24 0350 05/30/24 0350 05/30/24 0354 05/30/24 0350   99.5 °F (37.5 °C) 104 18 131/70 90 %      Temp src Heart Rate Source Patient Position BP Location FiO2 (%)   05/30/24 0350 05/30/24 0350 -- -- --   Tympanic Monitor            GENERAL: Awake and alert.  Oriented to self and year but not place or month.  Nontoxic-appearing elderly male.  HENT: nares patent  EYES: no scleral icterus  CV: regular rhythm, mildly tachycardic  RESPIRATORY: normal effort, clear to auscultation bilaterally  ABDOMEN: Distended, nontender  MUSCULOSKELETAL: Extremities are nontender  NEURO: Speech is clear and fluent.  Follows commands.  PSYCH:  calm, cooperative  SKIN: warm, dry    Vital signs and nursing notes  reviewed.        Plan: Obtain labs and chest x-ray, CT chest/abdomen/pelvis and CT head.  Patient will be started on empiric antibiotics.    EKG personally interpreted by me at 4:45 AM.  My personal interpretation is:          EKG time: 4:40 AM  Rhythm/Rate: Sinus tachycardia, rate 101  P waves and PA: LAE  QRS, axis: RBBB, RAD  ST and T waves: ST depression in the lateral and inferior leads    Interpreted Contemporaneously by me, independently viewed  EKG is not significantly changed compared to prior EKG done on 10/4/2023      ED Course as of 06/01/24 1529   u May 30, 2024   0357 I discussed the case with Dr. Deshpande and they agree to evaluate the patient at the bedside.    [CC]   0438 SpO2(!): 85 %  Patient on 2L [CC]   0443 XR Chest 1 View  Independent to rotation of the chest x-ray is questionable left-sided infiltrate concerning for pneumonia [CC]   0454 WBC(!!): 30.36  Broad spectrum antibiotics ordered [CC]   0506 Lactate(!!): 2.2 [CC]   0511 D-Dimer, Quant: 0.57 [CC]   0511 Potassium(!): 5.5  Hydrating patient, insulin ordered [CC]   0511 pCO2, Arterial: 44.0 [CC]   0521 Procalcitonin(!): 0.95 [CC]   0530 proBNP(!): 2,034.0  At baseline [CC]   0813 Nitrite, UA(!): Positive [KA]   0813 Leukocytes, UA(!): Large (3+) [KA]   0813 Blood, UA(!): Small (1+) [KA]   0824 Bacteria, UA(!): 4+ [KA]   0825 WBC, UA(!): Too Numerous to Count [KA]   0919 I reassessed the patient multiple times over the course of his ER evaluation.  He remained awake and alert, no hypotension.  I counseled him and his wife at the bedside about all of his lab and imaging results, findings of sepsis related to acute urinary tract infection, all incidental findings, recommendations for admission and they are agreeable.  I discussed patient with Dr. Grossman, hospitalist including history presentation workup and he agrees to admit for further evaluation and treatment. [KA]      ED Course User Index  [CC] Kareen Izaguirre PA-C  [KA] Zay,  CORI Root William D, MD  06/01/24 0660

## 2024-05-30 NOTE — ED PROVIDER NOTES
EMERGENCY DEPARTMENT ENCOUNTER  Room Number:  N636/1  PCP: Fernando Camargo DO  Independent Historians: Patient      HPI:  Chief Complaint: had concerns including Altered Mental Status.     A complete HPI/ROS/PMH/PSH/SH/FH are unobtainable due to: None    Chronic or social conditions impacting patient care (Social Determinants of Health): None      Context: Dilip Verma is a 74 y.o. male with a medical history of type 2 diabetes, hyperlipidemia, CAD, diastolic heart failure, CKD, history of stroke, and peripheral arterial disease who presents emergency department with altered mental status that began tonight.  Patient's wife said he was extremely restless tonight and was up walking around and going from room to room.  She says she thought he may be in pain so she gave him an extra tramadol which she says does seem to have calmed him down.  She says earlier today he was really diaphoretic and complained of just generally feeling unwell.  She says he has acted like this in the past when he had urinary tract infections.  Patient was most recently hospitalized in December for a perianal abscess which took a month to heal.  He has not been hospitalized over the last month.  Patient denies chest pain, shortness of air, or cough.  He denies abdominal pain, nausea, or vomiting.  Patient's wife reports that his abdomen appears distended.  She says he has required paracentesis in the past.  He does not drink alcohol and she denies he has a history of liver failure.      Review of prior external notes (non-ED) -and- Review of prior external test results outside of this encounter:   I reviewed the endocrinology office visit from yesterday, hemoglobin A1c was 9%    I reviewed labs from 10/7/2023, hemoglobin 14.6, creatinine 1.16, BUN 16    I reviewed the patient's admission to the hospital from 10/3/2033 to 10/7/2023 where he was admitted for a gluteal cleft abscess.  He was also having urinary retention.  Colorectal surgery  is on consult and recommended I&D.  He was started on appropriate antibiotics.  He was also treated with [facility] catheter for urinary retention.    PAST MEDICAL HISTORY  Active Ambulatory Problems     Diagnosis Date Noted    Anxiety     Colon polyp     Type 2 diabetes mellitus with hyperglycemia, with long-term current use of insulin     Erectile dysfunction     Hyperlipidemia     Type 2 acute myocardial infarction 07/12/2019    CAD (coronary artery disease) 07/20/2019    Hx of CABG 08/19/2019    Chronic diastolic heart failure 09/11/2019    Hypersomnia due to medical condition 09/14/2019    CSA (central sleep apnea) 09/14/2019    Periodic breathing 09/14/2019    HTN (hypertension) 02/26/2020    History of stroke 12/01/2020    CKD (chronic kidney disease) stage 3, GFR 30-59 ml/min 12/01/2020    Urinary retention 12/02/2020    Acute on chronic renal failure 12/03/2020    Acute UTI (urinary tract infection) 12/16/2020    Acute on chronic diastolic (congestive) heart failure 04/12/2021    Bacteriuria 04/16/2021    Rectal pain 04/16/2021    Status post total replacement of hip 01/11/2022    Vitamin D deficiency 12/29/2022    Post-acute COVID-19 syndrome 12/29/2022    Insulin dose changed 12/29/2022    Arthropathy associated with neurological disorder 12/29/2022    Personal history of colonic polyps 01/30/2023    Type 2 diabetes mellitus with diabetic neuropathy, with long-term current use of insulin 06/05/2023    Cervical spinal stenosis 07/20/2023    Abscess of skin 10/01/2023    Overweight 09/06/2023    Cervical stenosis of spine 10/03/2023    Spinal stenosis, lumbar region, without neurogenic claudication 10/03/2023    Medically noncompliant 10/03/2023    Chronic heart failure with preserved ejection fraction (HFpEF) 10/07/2023    Carotid stenosis 03/28/2024    Peripheral arterial disease 03/28/2024     Resolved Ambulatory Problems     Diagnosis Date Noted    Orthostasis 07/20/2019    Closed nondisplaced  intertrochanteric fracture of left femur 12/01/2020    Acute posthemorrhagic anemia 12/04/2020    Severe uncontrolled hypertension 06/05/2023    Cellulitis 10/03/2023    Gluteal abscess 10/03/2023     Past Medical History:   Diagnosis Date    AMENA (acute kidney injury) 12/03/2020    Alcoholism 1989    CORI positive     Anemia     Ataxia     Atypical chest pain 04/19/2022    Balance disorder     Cataract     Cervical radiculopathy 11/11/2019    Cervical spinal cord compression     Chronic diastolic (congestive) heart failure     Chronic kidney disease     Chronic pancreatitis     Closed left subtrochanteric femur fracture 12/01/2020    Colon polyps     Constipation     Contracture, right hand     Coronary artery disease     COVID-19 07/2022    DDD (degenerative disc disease), cervical     Diabetes mellitus, type II     Diabetic retinopathy     Difficulty walking     Dysphagia     Elevated brain natriuretic peptide (BNP) level 08/2014    Elevated LFTs 03/2021    Fissure, anal 2022    Foot drop     Fuchs' corneal dystrophy of right eye     Glaucoma     History of alcohol abuse     Hypersomnia     Hypertension     Hypertensive urgency 02/25/2020    Insomnia     Kidney stones     LV dysfunction 06/2016    Lyme disease     Lymphadenopathy syndrome 05/2021    Macular edema     Myocardial infarction 11/05/2019    Myocardial infarction 07/12/2019    Neuropathy in diabetes     Non-celiac gluten sensitivity     Osteoporosis     Oxygen dependent     PAD (peripheral artery disease)     PNA (pneumonia) 06/2016    Polyneuropathy     PTSD (post-traumatic stress disorder)     Pulmonary hypertension     Pulmonary nodule     Senile ectropion of both lower eyelids 02/2022    Sepsis 12/16/2020    Sleep apnea     Spinal stenosis in cervical region     Stroke 2012    Syncope and collapse 07/06/2019    Vision loss          PAST SURGICAL HISTORY  Past Surgical History:   Procedure Laterality Date    CARDIAC CATHETERIZATION N/A 07/15/2019     Procedure: Coronary angiography;  Surgeon: Carrie Price MD;  Location:  [facility];  Service: Cardiovascular    CARDIAC CATHETERIZATION N/A 07/15/2019    Procedure: Left Heart Cath;  Surgeon: Carrie Price MD;  Location:  [facility];  Service: Cardiovascular    CARDIAC CATHETERIZATION N/A 07/15/2019    Procedure: Left ventriculography;  Surgeon: Carrie Price MD;  Location:  [facility];  Service: Cardiovascular    CARDIAC CATHETERIZATION  07/15/2019    Procedure: Functional Flow [place];  Surgeon: Carrie Price MD;  Location:  [facility];  Service: Cardiovascular    CARDIAC CATHETERIZATION N/A 11/06/2019    Procedure: Right and Left Heart Cath;  Surgeon: Mya Smith MD;  Location:  [facility];  Service: Cardiovascular    CARDIAC CATHETERIZATION N/A 11/06/2019    Procedure: Coronary angiography;  Surgeon: Mya Smith MD;  Location: [facility];  Service: Cardiovascular    CARDIAC SURGERY      CATARACT EXTRACTION Left 2014    CATARACT EXTRACTION Right 11/2016    PHACO/IOL, DR. LEN DENTON    COLONOSCOPY N/A 03/16/2023    ENTIRE COLON WNL, RESCOPE IN 5 YRS, DR. LINDA LIZARRAGA AT [facility]    COLONOSCOPY W/ POLYPECTOMY N/A 01/02/2015    A FEW DIVERTICULA IN SIGMOID, 6 MM TUBULOVILLOUS ADENOMA POLYP IN RECTUM, SMALL HEMORRHOIDS, MELANOSIS COLI, DR. AVTAR JEONG AT [facility]    CORONARY ARTERY BYPASS GRAFT N/A 07/18/2019    Procedure: INTRAOPERATIVE SHOAIB; STERNOTOMY CORONARY ARTERY BYPASS x 3  USING LEFT INTERNAL MAMMARY ARTERY GRAFT UTILIZING ENDOSCOPICALLY HARVESTED RIGHT GREATER SAPHENOUS VEIN AND PRP.;  Surgeon: Bill Devi MD;  Location: [facility] MAIN OR;  Service: Cardiothoracic    CYSTOSCOPY BLADDER STONE LITHOTRIPSY N/A     DENTAL PROCEDURE Bilateral     3 surgeries inder implants    FEMUR IM NAILING/RODDING Left 12/03/2020    Procedure: LEFT HIP INTRAMEDULLARY NAIL;  Surgeon:  Niraj Ravi MD;  Location: [facility] MAIN OR;  Service: Orthopedic Spine;  Laterality: Left;    INCISION AND DRAINAGE PERIRECTAL ABSCESS N/A 10/04/2023    Procedure: Incision and drainage of perianal and buttock abscess;  Surgeon: Herbie Villatoro MD;  Location: [facility] MAIN OR;  Service: General;  Laterality: N/A;    INGUINAL HERNIA REPAIR Bilateral     TOENAIL EXCISION  2022    TONSILLECTOMY Bilateral     TOTAL HIP ARTHROPLASTY REVISION Left 2022    Procedure: TOTAL HIP ARTHROPLASTY REVISION- POSTERIOR;  Surgeon: Jurgen Candelaria II, MD;  Location: [facility] MAIN OR;  Service: Orthopedics;  Laterality: Left;    VASECTOMY N/A          FAMILY HISTORY  Family History   Problem Relation Age of Onset    Heart disease Mother     Hypertension Mother     Hyperlipidemia Mother     Depression Mother     Cancer Mother         uterine    Arrhythmia Mother     Uterine cancer Mother     Mental illness Mother     Migraines Mother     Hyperlipidemia Father     Heart disease Father     Hypertension Father     Kidney disease Father     Thyroid disease Father     Heart failure Father     Depression Sister     Alcohol abuse Brother     Thyroid disease Paternal Uncle     Lung disease Paternal Uncle     Stroke Maternal Grandmother     Glaucoma Maternal Grandmother     Heart attack Paternal Grandmother     Stroke Paternal Grandmother     Alcohol abuse Paternal Grandfather     Malig Hyperthermia Neg Hx          SOCIAL HISTORY  Social History     Socioeconomic History    Marital status:    Tobacco Use    Smoking status: Former     Current packs/day: 0.00     Average packs/day: 0.8 packs/day for 16.0 years (12.0 ttl pk-yrs)     Types: Cigarettes     Start date: 1984     Quit date: 2000     Years since quittin.4     Passive exposure: Past    Smokeless tobacco: Never    Tobacco comments:     Start age:39/Stopping age:47, 3/4 PPD   Vaping Use    Vaping status: Never Used   Substance and Sexual  Activity    Alcohol use: Not Currently     Comment: none since 1996    Drug use: No    Sexual activity: Not Currently     Birth control/protection: Vasectomy         ALLERGIES  Ace inhibitors, Angiotensin receptor blockers, Dapagliflozin, Losartan, Seroquel [quetiapine], Xanax [alprazolam], Amlodipine, Ativan [lorazepam], Baclofen, Misc. sulfonamide containing compounds, Minoxidil, and Tetracycline      REVIEW OF SYSTEMS  Included in HPI  All systems reviewed and negative except for those discussed in HPI.      PHYSICAL EXAM    I have reviewed the triage vital signs and nursing notes.    ED Triage Vitals   Temp Heart Rate Resp BP SpO2   05/30/24 0350 05/30/24 0350 05/30/24 0350 05/30/24 0354 05/30/24 0350   99.5 °F (37.5 °C) 104 18 131/70 90 %      Temp src Heart Rate Source Patient Position BP Location FiO2 (%)   05/30/24 0350 05/30/24 0350 -- -- --   Tympanic Monitor          Physical Exam  Constitutional:       General: He is not in acute distress.     Appearance: Normal appearance. He is not toxic-appearing.   HENT:      Head: Normocephalic and atraumatic.      Mouth/Throat:      Mouth: Mucous membranes are moist.   Eyes:      Extraocular Movements: Extraocular movements intact.      Pupils: Pupils are equal, round, and reactive to light.   Cardiovascular:      Rate and Rhythm: Normal rate and regular rhythm.      Pulses: Normal pulses.      Heart sounds: Normal heart sounds.   Pulmonary:      Effort: Pulmonary effort is normal. No respiratory distress.      Breath sounds: Normal breath sounds. No wheezing, rhonchi or rales.   Abdominal:      General: Abdomen is flat. There is distension.      Tenderness: There is no abdominal tenderness.      Comments: Abdomen is taut and diffusely distended with fluid wave.   Musculoskeletal:         General: Normal range of motion.      Cervical back: Normal range of motion and neck supple.   Skin:     General: Skin is warm and dry.      Capillary Refill: Capillary refill  takes less than 2 seconds.   Neurological:      General: No focal deficit present.      Mental Status: He is alert.      Comments: Oriented to self and time but not to place or situation.  Spontaneously moving all extremities.  Sensory and motor grossly intact.   Psychiatric:         Mood and Affect: Mood normal.         Behavior: Behavior normal.           LAB RESULTS  Recent Results (from the past 24 hour(s))   Comprehensive Metabolic Panel    Collection Time: 05/30/24  4:33 AM    Specimen: Blood   Result Value Ref Range    Glucose 306 (H) 65 - 99 mg/dL    BUN 24 (H) 8 - 23 mg/dL    Creatinine 1.17 0.76 - 1.27 mg/dL    Sodium 138 136 - 145 mmol/L    Potassium 5.5 (H) 3.5 - 5.2 mmol/L    Chloride 98 98 - 107 mmol/L    CO2 29.2 (H) 22.0 - 29.0 mmol/L    Calcium 9.6 8.6 - 10.5 mg/dL    Total Protein 7.4 6.0 - 8.5 g/dL    Albumin 4.2 3.5 - 5.2 g/dL    ALT (SGPT) 31 1 - 41 U/L    AST (SGOT) 33 1 - 40 U/L    Alkaline Phosphatase 86 39 - 117 U/L    Total Bilirubin 1.3 (H) 0.0 - 1.2 mg/dL    Globulin 3.2 gm/dL    A/G Ratio 1.3 g/dL    BUN/Creatinine Ratio 20.5 7.0 - 25.0    Anion Gap 10.8 5.0 - 15.0 mmol/L    eGFR 65.0 >60.0 mL/min/1.73   Protime-INR    Collection Time: 05/30/24  4:33 AM    Specimen: Blood   Result Value Ref Range    Protime 14.1 11.7 - 14.2 Seconds    INR 1.07 0.90 - 1.10   aPTT    Collection Time: 05/30/24  4:33 AM    Specimen: Blood   Result Value Ref Range    PTT 28.0 22.7 - 35.4 seconds   High Sensitivity Troponin T    Collection Time: 05/30/24  4:33 AM    Specimen: Blood   Result Value Ref Range    HS Troponin T 40 (H) <22 ng/L   D-dimer, Quantitative    Collection Time: 05/30/24  4:33 AM    Specimen: Blood   Result Value Ref Range    D-Dimer, Quantitative 0.57 0.00 - 0.75 MCGFEU/mL   Lactic Acid, Plasma    Collection Time: 05/30/24  4:33 AM    Specimen: Blood   Result Value Ref Range    Lactate 2.2 (C) 0.5 - 2.0 mmol/L   Procalcitonin    Collection Time: 05/30/24  4:33 AM    Specimen: Blood    Result Value Ref Range    Procalcitonin 0.95 (H) 0.00 - 0.25 ng/mL   Magnesium    Collection Time: 05/30/24  4:33 AM    Specimen: Blood   Result Value Ref Range    Magnesium 1.9 1.6 - 2.4 mg/dL   Ammonia    Collection Time: 05/30/24  4:33 AM    Specimen: Blood   Result Value Ref Range    Ammonia 20 16 - 60 umol/L   CBC Auto Differential    Collection Time: 05/30/24  4:33 AM    Specimen: Blood   Result Value Ref Range    WBC 30.36 (C) 3.40 - 10.80 10*3/mm3    RBC 5.29 4.14 - 5.80 10*6/mm3    Hemoglobin 16.4 13.0 - 17.7 g/dL    Hematocrit 50.4 37.5 - 51.0 %    MCV 95.3 79.0 - 97.0 fL    MCH 31.0 26.6 - 33.0 pg    MCHC 32.5 31.5 - 35.7 g/dL    RDW 13.0 12.3 - 15.4 %    RDW-SD 46.3 37.0 - 54.0 fl    MPV 11.0 6.0 - 12.0 fL    Platelets 230 140 - 450 10*3/mm3    Neutrophil % 90.7 (H) 42.7 - 76.0 %    Lymphocyte % 1.7 (L) 19.6 - 45.3 %    Monocyte % 6.4 5.0 - 12.0 %    Eosinophil % 0.0 (L) 0.3 - 6.2 %    Basophil % 0.2 0.0 - 1.5 %    Immature Grans % 1.0 (H) 0.0 - 0.5 %    Neutrophils, Absolute 27.54 (H) 1.70 - 7.00 10*3/mm3    Lymphocytes, Absolute 0.52 (L) 0.70 - 3.10 10*3/mm3    Monocytes, Absolute 1.93 (H) 0.10 - 0.90 10*3/mm3    Eosinophils, Absolute 0.00 0.00 - 0.40 10*3/mm3    Basophils, Absolute 0.06 0.00 - 0.20 10*3/mm3    Immature Grans, Absolute 0.31 (H) 0.00 - 0.05 10*3/mm3   BNP    Collection Time: 05/30/24  4:33 AM    Specimen: Blood   Result Value Ref Range    proBNP 2,034.0 (H) 0.0 - 1,800.0 pg/mL   ECG 12 Lead Altered Mental Status    Collection Time: 05/30/24  4:40 AM   Result Value Ref Range    QT Interval 370 ms    QTC Interval 480 ms   Red Top    Collection Time: 05/30/24  4:43 AM   Result Value Ref Range    Extra Tube Hold for add-ons.    Blood Gas, Arterial -    Collection Time: 05/30/24  5:05 AM    Specimen: Arterial Blood   Result Value Ref Range    Site Right Brachial     Quinn's Test N/A     pH, Arterial 7.420 7.350 - 7.450 pH units    pCO2, Arterial 44.0 35.0 - 45.0 mm Hg    pO2, Arterial 56.9  (L) 80.0 - 100.0 mm Hg    HCO3, Arterial 28.5 (H) 22.0 - 28.0 mmol/L    Base Excess, Arterial 3.3 (H) 0.0 - 2.0 mmol/L    O2 Saturation, Arterial 89.5 (L) 92.0 - 98.5 %    CO2 Content 29.9 (H) 23 - 27 mmol/L    Barometric Pressure for Blood Gas 752.9000 mmHg    Modality Cannula     Flow Rate 3.5000 lpm    Rate 18 Breaths/minute    Hemodilution No     Device Comment SpO2 92%    Urinalysis With Culture If Indicated - Urine, Clean Catch    Collection Time: 05/30/24  5:38 AM    Specimen: Urine, Clean Catch   Result Value Ref Range    Color, UA Dark Yellow (A) Yellow, Straw    Appearance, UA Turbid (A) Clear    pH, UA 6.0 5.0 - 8.0    Specific Gravity, UA 1.022 1.005 - 1.030    Glucose,  mg/dL (Trace) (A) Negative    Ketones, UA 15 mg/dL (1+) (A) Negative    Bilirubin, UA Negative Negative    Blood, UA Small (1+) (A) Negative    Protein,  mg/dL (2+) (A) Negative    Leuk Esterase, UA Large (3+) (A) Negative    Nitrite, UA Positive (A) Negative    Urobilinogen, UA 1.0 E.U./dL 0.2 - 1.0 E.U./dL   Urinalysis, Microscopic Only - Urine, Clean Catch    Collection Time: 05/30/24  5:38 AM    Specimen: Urine, Clean Catch   Result Value Ref Range    RBC, UA 3-5 (A) None Seen, 0-2 /HPF    WBC, UA Too Numerous to Count (A) None Seen, 0-2 /HPF    Bacteria, UA 4+ (A) None Seen /HPF    Squamous Epithelial Cells, UA 0-2 None Seen, 0-2 /HPF    Hyaline Casts, UA None Seen None Seen /LPF    Methodology Automated Microscopy    POC Glucose Once    Collection Time: 05/30/24  6:32 AM    Specimen: Blood   Result Value Ref Range    Glucose 264 (H) 70 - 130 mg/dL   High Sensitivity Troponin T 2Hr    Collection Time: 05/30/24  6:35 AM    Specimen: Blood   Result Value Ref Range    HS Troponin T 38 (H) <22 ng/L    Troponin T Delta -2 >=-4 - <+4 ng/L   STAT Lactic Acid, Reflex    Collection Time: 05/30/24  8:24 AM    Specimen: Arm, Left; Blood   Result Value Ref Range    Lactate 2.0 0.5 - 2.0 mmol/L   POC Glucose Once    Collection Time:  05/30/24  9:04 AM    Specimen: Blood   Result Value Ref Range    Glucose 228 (H) 70 - 130 mg/dL   Basic Metabolic Panel    Collection Time: 05/30/24  9:55 AM    Specimen: Arm, Right; Blood   Result Value Ref Range    Glucose 228 (H) 65 - 99 mg/dL    BUN 22 8 - 23 mg/dL    Creatinine 0.90 0.76 - 1.27 mg/dL    Sodium 140 136 - 145 mmol/L    Potassium 4.0 3.5 - 5.2 mmol/L    Chloride 107 98 - 107 mmol/L    CO2 24.6 22.0 - 29.0 mmol/L    Calcium 7.3 (L) 8.6 - 10.5 mg/dL    BUN/Creatinine Ratio 24.4 7.0 - 25.0    Anion Gap 8.4 5.0 - 15.0 mmol/L    eGFR 89.1 >60.0 mL/min/1.73   POC Glucose Once    Collection Time: 05/30/24  1:09 PM    Specimen: Blood   Result Value Ref Range    Glucose 244 (H) 70 - 130 mg/dL   POC Glucose Once    Collection Time: 05/30/24  4:07 PM    Specimen: Blood   Result Value Ref Range    Glucose 302 (H) 70 - 130 mg/dL         RADIOLOGY  CT Abdomen Pelvis With Contrast, CT Chest With Contrast Diagnostic    Result Date: 5/30/2024  CT CHEST W CONTRAST DIAGNOSTIC-, CT ABDOMEN PELVIS W CONTRAST-  DATE OF EXAM: 5/30/2024 5:45 AM  INDICATION: Significant leukocytosis of uncertain etiology. Acute respiratory failure with hypoxia. Sepsis. Altered mental status. History of perianal abscess.  COMPARISON: Chest radiograph 5/30/2024 and 10/4/2023. MRI lumbar spine 10/5/2023. CT chest 4/19/2022. Pelvis radiograph 1/11/2022.  TECHNIQUE: Multiple contiguous axial images were acquired through the chest, abdomen, and pelvis following the intravenous administration of 100 mL of Isovue-300. Reformatted coronal and sagittal sequences were also reviewed. Radiation dose reduction techniques were utilized, including automated exposure control and exposure modulation based on body size.  FINDINGS: CT CHEST: Low lung volumes with bilateral perihilar and bibasilar opacities most consistent with subsegmental atelectasis and/or scarring. Mild bilateral dependent atelectasis. Stable chronic 9 mm subpleural nodule in the anterior  base of the lingula with some peripheral calcification (axial series 3 image 59), likely benign given the long-term stability. Calcified and prior granulomatous disease in both lungs. No focal lung consolidation. Mild chronic emphysematous changes in both lungs. The central airways are widely patent. No pneumothorax or pleural effusion.  The heart is normal in size. Severe calcified coronary artery disease with postoperative changes from CABG. No pericardial effusion. Calcified atherosclerotic disease in the thoracic aorta without aneurysm. Mild enlargement of the main pulmonary artery, suggesting pulmonary arterial hypertension. Calcified remnants of prior granulomatous disease in the mediastinum and hilum. Mildly enlarged mediastinal lymph nodes with an index left lower paratracheal lymph node measuring 2 cm x 1 cm (axial series 2 image 36), possibly reactive and not significantly changed. Small hiatal hernia.  Flowing multilevel mid and lower thoracic anterior osteophyte formation and mild upper thoracic scoliosis. Sternotomy wires. Partially imaged chronic changes in both shoulders. No acute osseous abnormality or concerning osseous lesion.  CT ABDOMEN AND PELVIS: Mildly heterogeneous hepatic attenuation with subtle nodular liver contours, which could reflect underlying hepatocellular disease/cirrhosis. The gallbladder and spleen are unremarkable. Suspected chronic splenic vein thrombosis with peripheral calcification and numerous collaterals in the left upper quadrant. Additional pancreatic/peripancreatic calcifications could reflect sequela of chronic pancreatitis. The adrenal glands and kidneys are unremarkable. The urinary bladder is nondistended. Diffuse urinary bladder wall thickening and mild perivesical fat stranding, which could accentuated under distention but could also reflect cystitis. Dystrophic appearing calcifications in the prostate gland.  Tiny hiatal hernia with mild nonspecific thickening of  the distal esophageal wall. Mild gastric wall thickening could be accentuated by under distention. Moderate colonic stool. No bowel obstruction or significant bowel wall thickening. The appendix is normal.  No free fluid in the abdomen or pelvis. No free intraperitoneal air. No pathologically enlarged lymph nodes in the abdomen or pelvis. Calcified atherosclerotic disease in the abdominal aorta and its distal branches without aneurysm.  Nonspecific left inguinal fat stranding. Small fat-containing umbilical and right inguinal hernias. Similar-appearing multilevel lumbar spondylosis with chronic/degenerative grade 1 retrolisthesis of L2 on L3 and L5 on S1. Partially imaged left JUDI. Partially imaged cerclage wire fixation of the partially healed periprosthetic fracture of the proximal left femur. Moderate right hip joint DJD. No acute osseous abnormality or concerning osseous lesion.       1. Diffuse urinary bladder wall thickening and mild perivesical fat stranding, which could be accentuated by under distention but could reflect a nonspecific cystitis but recommend correlating with urinalysis. 2. Low lung volumes with multifocal bilateral perihilar and bibasilar opacities that likely represent subsegmental atelectasis and/or scarring. 3. Likely benign 9 mm subpleural nodule in the base of the lingula and likely reactive mediastinal and bilateral hilar lymphadenopathy given the long-term stability. 4. Nonspecific fat stranding in the left inguinal region. Correlate for recent trauma or instrumentation. 5. Chronic appearing splenic vein thrombosis with peripheral calcification and multiple venous collaterals in the left upper quadrant. Additional pancreatic/peripancreatic calcifications could reflect sequela of chronic pancreatitis. 6. Mildly heterogeneous hepatic attenuation with subtle nodular liver contours, which could reflect underlying hepatocellular disease/cirrhosis. 7. Tiny hiatal hernia with mild  nonspecific thickening of the distal esophageal wall and mild gastric wall thickening that could be accentuated by under distention. Correlate for GERD and possible gastritis.  This report was finalized on 5/30/2024 7:35 AM by Julio Thomas MD on Workstation: [id number]      CT Head Without Contrast    Result Date: 5/30/2024  CT OF THE BRAIN WITHOUT CONTRAST 05/30/2024  HISTORY: Altered mental status.  Axial images were obtained through the brain without intravenous contrast.  There is mild-to-moderate diffuse atrophy. There is decreased attenuation of the periventricular white matter bilaterally consistent with small vessel white matter ischemic disease. There is no evidence of acute infarction, hemorrhage, midline shift or mass effect.  No bony abnormalities are seen.       No acute process identified.    Radiation dose reduction techniques were utilized, including automated exposure control and exposure modulation based on body size.       XR Chest 1 View    Result Date: 5/30/2024  SINGLE VIEW OF THE CHEST  HISTORY: Altered mental status  COMPARISON: October 4, 2023  FINDINGS: Cardiomegaly is present. Patient is status post median sternotomy with coronary artery bypass grafting. No pneumothorax or pleural effusion is seen. Linear scarring versus atelectasis is noted within the left midlung. Nonspecific bibasilar consolidation is noted.      Nonspecific bibasilar consolidation.  This report was finalized on 5/30/2024 4:47 AM by Dr. Gayle Okeefe M.D on Workstation: [id number]         MEDICATIONS GIVEN IN ER  Medications   sodium chloride 0.9 % flush 10 mL (10 mL Intravenous Given 5/30/24 1143)   sodium chloride 0.9 % flush 10 mL (has no administration in time range)   sodium chloride 0.9 % infusion 40 mL (has no administration in time range)   Enoxaparin Sodium (LOVENOX) syringe 40 mg (40 mg Subcutaneous Given 5/30/24 1136)   nitroglycerin (NITROSTAT) SL tablet 0.4 mg (has no administration in time range)    sodium chloride 0.9 % infusion (125 mL/hr Intravenous New Bag 5/30/24 1143)   cefTRIAXone (ROCEPHIN) 2,000 mg in sodium chloride 0.9 % 100 mL MBP (2,000 mg Intravenous New Bag 5/30/24 1136)   acetaminophen (TYLENOL) tablet 650 mg (has no administration in time range)     Or   acetaminophen (TYLENOL) 160 MG/5ML oral solution 650 mg (has no administration in time range)     Or   acetaminophen (TYLENOL) suppository 650 mg (has no administration in time range)   sennosides-docusate (PERICOLACE) 8.6-50 MG per tablet 2 tablet (2 tablets Oral Not Given 5/30/24 1135)     And   polyethylene glycol (MIRALAX) packet 17 g (has no administration in time range)     And   bisacodyl (DULCOLAX) EC tablet 5 mg (has no administration in time range)     And   bisacodyl (DULCOLAX) suppository 10 mg (has no administration in time range)   ondansetron (ZOFRAN) injection 4 mg (has no administration in time range)   aspirin EC tablet 81 mg (has no administration in time range)   brimonidine (ALPHAGAN) 0.2 % ophthalmic solution 1 drop (1 drop Both Eyes Given 5/30/24 1358)   DULoxetine (CYMBALTA) DR capsule 30 mg (has no administration in time range)   insulin glargine (LANTUS, SEMGLEE) injection 24 Units (24 Units Subcutaneous Given 5/30/24 1358)   latanoprost (XALATAN) 0.005 % ophthalmic solution 1 drop (has no administration in time range)   dextrose (GLUTOSE) oral gel 15 g (has no administration in time range)   dextrose (D50W) (25 g/50 mL) IV injection 25 g (has no administration in time range)   glucagon (GLUCAGEN) injection 1 mg (has no administration in time range)   insulin lispro (HUMALOG/ADMELOG) injection 2-9 Units (7 Units Subcutaneous Given 5/30/24 1826)   OLANZapine (zyPREXA) tablet 5 mg (has no administration in time range)   OLANZapine (zyPREXA) injection 10 mg (10 mg Intramuscular Given 5/30/24 1358)   vancomycin 1750 mg/500 mL 0.9% NS IVPB ([facility]) (0 mg Intravenous Stopped 5/30/24 3264)   piperacillin-tazobactam (ZOSYN)  3.375 g IVPB in 100 mL NS MBP (CD) (0 g Intravenous Stopped 5/30/24 0607)   sodium chloride 0.9 % bolus 1,000 mL (0 mL Intravenous Stopped 5/30/24 0809)   insulin regular (humuLIN R,novoLIN R) injection 5 Units (5 Units Intravenous Given 5/30/24 0635)   iopamidol (ISOVUE-300) 61 % injection 100 mL (100 mL Intravenous Given 5/30/24 0547)           OUTPATIENT MEDICATION MANAGEMENT:  Current Facility-Administered Medications Ordered in Epic   Medication Dose Route Frequency Provider Last Rate Last Admin    acetaminophen (TYLENOL) tablet 650 mg  650 mg Oral Q4H PRN Jaquan Grossman MD        Or    acetaminophen (TYLENOL) 160 MG/5ML oral solution 650 mg  650 mg Oral Q4H PRN Jaquan Grossman MD        Or    acetaminophen (TYLENOL) suppository 650 mg  650 mg Rectal Q4H PRN Jaquan Grossman MD        aspirin EC tablet 81 mg  81 mg Oral Daily Jaquan Grossman MD        sennosides-docusate (PERICOLACE) 8.6-50 MG per tablet 2 tablet  2 tablet Oral BID Jaquan Grossman MD        And    polyethylene glycol (MIRALAX) packet 17 g  17 g Oral Daily PRN Jaquan Grossman MD        And    bisacodyl (DULCOLAX) EC tablet 5 mg  5 mg Oral Daily PRN Jaquan Grossman MD        And    bisacodyl (DULCOLAX) suppository 10 mg  10 mg Rectal Daily PRN Jaquan Grossman MD        brimonidine (ALPHAGAN) 0.2 % ophthalmic solution 1 drop  1 drop Both Eyes BID Jaquan Grossman MD   1 drop at 05/30/24 1358    cefTRIAXone (ROCEPHIN) 2,000 mg in sodium chloride 0.9 % 100 mL MBP  2,000 mg Intravenous Q24H Jaquan Grossman  mL/hr at 05/30/24 1136 2,000 mg at 05/30/24 1136    dextrose (D50W) (25 g/50 mL) IV injection 25 g  25 g Intravenous Q15 Min PRN Jaquan Grossman MD        dextrose (GLUTOSE) oral gel 15 g  15 g Oral Q15 Min PRN Jaquan Grossman MD        DULoxetine (CYMBALTA) DR capsule 30 mg  30 mg Oral Daily Jaquan Grossman  MD Olman        Enoxaparin Sodium (LOVENOX) syringe 40 mg  40 mg Subcutaneous Daily Jaquan Grossman MD   40 mg at 05/30/24 1136    glucagon (GLUCAGEN) injection 1 mg  1 mg Intramuscular Q15 Min PRN Jaquan Grossman MD        insulin glargine (LANTUS, SEMGLEE) injection 24 Units  24 Units Subcutaneous Daily Jaquan Grossman MD   24 Units at 05/30/24 1358    insulin lispro (HUMALOG/ADMELOG) injection 2-9 Units  2-9 Units Subcutaneous 4x Daily AC & at Bedtime Jaquan Grossman MD   7 Units at 05/30/24 1826    latanoprost (XALATAN) 0.005 % ophthalmic solution 1 drop  1 drop Both Eyes Nightly Jaquan Grossman MD        nitroglycerin (NITROSTAT) SL tablet 0.4 mg  0.4 mg Sublingual Q5 Min PRN Jaquan Grossman MD        OLANZapine (zyPREXA) injection 10 mg  10 mg Intramuscular Q8H PRN Jaquan Grossman MD   10 mg at 05/30/24 1358    OLANZapine (zyPREXA) tablet 5 mg  5 mg Oral Nightly Jaquan Grossman MD        ondansetron (ZOFRAN) injection 4 mg  4 mg Intravenous Q6H PRN Jaquan Grossman MD        sodium chloride 0.9 % flush 10 mL  10 mL Intravenous Q12H Jaquan Grossman MD   10 mL at 05/30/24 1143    sodium chloride 0.9 % flush 10 mL  10 mL Intravenous PRN Jaquan Grossman MD        sodium chloride 0.9 % infusion 40 mL  40 mL Intravenous PRN Jaquan Grossman MD        sodium chloride 0.9 % infusion  125 mL/hr Intravenous Continuous Jaquan Grossman  mL/hr at 05/30/24 1143 125 mL/hr at 05/30/24 1143     No current [facility]-ordered outpatient medications on file.           PROGRESS, DATA ANALYSIS, CONSULTS, AND MEDICAL DECISION MAKING  ORDERS PLACED DURING THIS VISIT:  Orders Placed This Encounter   Procedures    Blood Culture - Blood,    Blood Culture - Blood,    Urine Culture - Urine,    XR Chest 1 View    CT Head Without Contrast    CT Abdomen Pelvis With Contrast    CT Chest With Contrast  Diagnostic    Comprehensive Metabolic Panel    Protime-INR    aPTT    Urinalysis With Culture If Indicated - Urine, Clean Catch    High Sensitivity Troponin T    D-dimer, Quantitative    Lactic Acid, Plasma    Procalcitonin    Magnesium    Ammonia    CBC Auto Differential    Blood Gas, Arterial -    STAT Lactic Acid, Reflex    BNP    High Sensitivity Troponin T 2Hr    Urinalysis, Microscopic Only - Urine, Clean Catch    Basic Metabolic Panel    Comprehensive Metabolic Panel    Diet: Regular/House, Diabetic, Cardiac; Healthy Heart (2-3 Na+); Consistent Carbohydrate; Texture: Regular (IDDSI 7); Fluid Consistency: Thin (IDDSI 0)    Vital Signs    Ambulate Patient    Up in Chair    Intake & Output    Weigh patient    Oral Care    Maintain IV Access    Telemetry - Place Orders & Notify Provider of Results When Patient Experiences Acute Chest Pain, Dysrhythmia or Respiratory Distress    May Be Off Telemetry for Tests    Code Status and Medical Interventions:    TRAVIS (on-call MD unless specified) Details    OT Consult: Eval & Treat ADL Performance Below Baseline    PT Consult: Eval & Treat Functional Mobility Below Baseline    POC Glucose Once    POC Glucose STAT    POC Glucose Once    POC Glucose 4x Daily Before Meals & at Bedtime    POC Glucose Once    POC Glucose Once    ECG 12 Lead Altered Mental Status    Telemetry Scan    Telemetry Scan    Telemetry Scan    Insert Peripheral IV    Inpatient Admission    CBC & Differential    Extra Tubes    Red Top    CBC & Differential       All labs have been independently interpreted by me.  All radiology studies have been reviewed by me. All EKG's have been independently viewed and interpreted by me.  Discussion below represents my analysis of pertinent findings related to patient's condition, differential diagnosis, treatment plan and final disposition.    Differential diagnosis includes but is not limited to:   My differential diagnosis for altered mental status includes but is  not limited to:  Hypoglycemia, hyperglycemia, DKA, overdose, ethanol intoxication, thiamine deficiency, niacin deficiency, hypothymia, hyperviscosity, Nghia’s disease, hyponatremia, hypernatremia, liver failure, kidney failure, hyper or hypothyroid, no insufficiency, hypoxia, hypercarbia, carbon monoxide poisoning, postanoxic encephalopathy, ischemic stroke, intracranial bleed, subarachnoid hemorrhage, brain tumor, closed head injury, epidural hematoma, epidural hematoma, seizure activity, postictal state, syncopal episode, disseminated encephalomyelitis, central pontine myelinolysis, post cardiac arrest, bacterial meningitis, viral meningitis, fungal meningitis, encephalitis, brain abscess, subdural empyema, hysteria, catatonic state, malingering, hypertensive encephalopathy, vasculitis, TTP, DIC      ED Course:  ED Course as of 05/30/24 2007   Thu May 30, 2024   0357 I discussed the case with Dr. Deshpande and they agree to evaluate the patient at the bedside.    [CC]   0438 SpO2(!): 85 %  Patient on 2L [CC]   0443 XR Chest 1 View  Independent to rotation of the chest x-ray is questionable left-sided infiltrate concerning for pneumonia [CC]   0454 WBC(!!): 30.36  Broad spectrum antibiotics ordered [CC]   0506 Lactate(!!): 2.2 [CC]   0511 D-Dimer, Quant: 0.57 [CC]   0511 Potassium(!): 5.5  Hydrating patient, insulin ordered [CC]   0511 pCO2, Arterial: 44.0 [CC]   0521 Procalcitonin(!): 0.95 [CC]   0530 proBNP(!): 2,034.0  At baseline [CC]   0813 Nitrite, UA(!): Positive [KA]   0813 Leukocytes, UA(!): Large (3+) [KA]   0813 Blood, UA(!): Small (1+) [KA]   0824 Bacteria, UA(!): 4+ [KA]   0825 WBC, UA(!): Too Numerous to Count [KA]   0919 I reassessed the patient multiple times over the course of his ER evaluation.  He remained awake and alert, no hypotension.  I counseled him and his wife at the bedside about all of his lab and imaging results, findings of sepsis related to acute urinary tract infection, all  incidental findings, recommendations for admission and they are agreeable.  I discussed patient with Dr. Marcelino, hospitalist including history presentation workup and he agrees to admit for further evaluation and treatment. [KA]      ED Course User Index  [CC] Kareen Izaguirre PA-C  [KA] Ivett Collazo PA-C           AS OF 20:07 EDT VITALS:    BP - (!) 144/106  HR - 100  TEMP - 98.8 °F (37.1 °C) (Oral)  O2 SATS - 92%        COMPLEXITY OF CARE  The patient requires admission.    Critical care provider statement:    Critical care time (minutes): 31.   Critical care time was exclusive of:  Separately billable procedures and treating other patients   Critical care was necessary to treat or prevent imminent or life-threatening deterioration of the following conditions:  Sepsis   Critical care was time spent personally by me on the following activities:  Development of treatment plan with patient or surrogate, discussions with consultants, evaluation of patient's response to treatment, examination of patient, obtaining history from patient or surrogate, ordering and performing treatments and interventions, ordering and review of laboratory studies, ordering and review of radiographic studies, pulse oximetry, re-evaluation of patient's condition and review of old charts. Critical Care indicators: Sepsis / septicemia     DIAGNOSIS  Final diagnoses:   Acute respiratory failure with hypoxia   Sepsis without acute organ dysfunction, due to unspecified organism   Hyperkalemia   Metabolic encephalopathy   Acute UTI   Hyperglycemia due to diabetes mellitus         DISPOSITION  ED Disposition       ED Disposition   Decision to Admit    Condition   --    Comment   Level of Care: Telemetry [5]   Diagnosis: Sepsis [[id number]]   Admitting Physician: CATERINA MARCELINO [[id number]]   Attending Physician: CATERINA MARCELINO [[id number]]   Certification: I Certify That Inpatient Hospital Services Are Medically Necessary For  Greater Than 2 Midnights                      Please note that portions of this document were completed with a voice recognition program.    Note Disclaimer: At [facility], we believe that sharing information builds trust and better relationships. You are receiving this note because you recently visited [facility]. It is possible you will see health information before a provider has talked with you about it. This kind of information can be easy to misunderstand. To help you fully understand what it means for your health, we urge you to discuss this note with your provider.     Kareen Izaguirre PA-C  05/30/24 2050

## 2024-05-30 NOTE — CASE MANAGEMENT/SOCIAL WORK
Discharge Planning Assessment  [facility]     Patient Name: Dilip Verma  MRN: [id number]  Today's Date: 5/30/2024    Admit Date: 5/30/2024    Plan: Home with wife, pending progress may need HH or SNF   Discharge Needs Assessment       Row Name 05/30/24 1344       Living Environment    People in Home spouse    Name(s) of People in Home Beny    Current Living Arrangements home    Potentially Unsafe Housing Conditions none    Primary Care Provided by self;spouse/significant other    Family Caregiver if Needed spouse    Quality of Family Relationships involved;helpful    Able to Return to Prior Arrangements yes       Resource/Environmental Concerns    Resource/Environmental Concerns none       Transition Planning    Patient/Family Anticipates Transition to home with family;home with help/services;inpatient rehabilitation facility    Patient/Family Anticipated Services at Transition skilled nursing;home health care    Transportation Anticipated family or friend will provide       Discharge Needs Assessment    Readmission Within the Last 30 Days no previous admission in last 30 days    Equipment Currently Used at Home shower chair;rollator;oxygen;ramp;wheelchair;lift device;grab bar;glucometer;commode;bipap    Concerns to be Addressed adjustment to diagnosis/illness    Anticipated Changes Related to Illness inability to care for self    Equipment Needed After Discharge none                   Discharge Plan       Row Name 05/30/24 9504       Plan    Plan Home with wife, pending progress may need HH or SNF    Patient/Family in Agreement with Plan yes    Plan Comments Spoke to pt wife at bedside, introduced self and explained CCP role, face sheet and pharmacy information verified. Pt lives with his wife in level home with ramp to enter and stair lift to basement if needed. He usually needs some help with ADL's from Beny, he has bsc, w/c, s/c, gb, rollator, nocturnal oxygen provide by [employer] at 3L, stair lift and he  has a bipap but it is recalled, he is scheduled for sleep study Monday. He has used [facility] and [facility] in the past and been to [facility] and [facility], would return to either  or [facility] if needed. Pt at this time plans home with wife to transfer, unsure if will have needs, therapy eval pending.  CCP will follow - Nabila GONZALEZ                  Continued Care and Services - Admitted Since 5/30/2024    No active coordination exists for this encounter.       Expected Discharge Date and Time       Expected Discharge Date Expected Discharge Time    Jun 1, 2024            Demographic Summary       Row Name 05/30/24 1343       General Information    Admission Type inpatient                   Functional Status       Row Name 05/30/24 1343       Functional Status    Usual Activity Tolerance moderate    Current Activity Tolerance moderate       Assessment of Health Literacy    Health Literacy Fair       Functional Status, IADL    Medications assistive person    Meal Preparation assistive person    Housekeeping assistive person    Laundry assistive person    Shopping assistive person    IADL Comments wife assists with adls as needed       Mental Status    General Appearance WDL WDL       Mental Status Summary    Recent Changes in Mental Status/Cognitive Functioning unable to assess                   Psychosocial    No documentation.                  Abuse/Neglect    No documentation.                  Legal       Row Name 05/30/24 1344       Financial/Legal    Who Manages Finances if Patient Unable wife Beny                   Substance Abuse    No documentation.                  Patient Forms    No documentation.                     Nabila Ramsey RN

## 2024-05-30 NOTE — PROGRESS NOTES
Clinical Pharmacy Services: Medication History    Dilip Verma is a 74 y.o. male presenting to [facility] for   Chief Complaint   Patient presents with    Altered Mental Status       He  has a past medical history of AMENA (acute kidney injury) (12/03/2020), Alcoholism (1989), CORI positive, Anemia, Anxiety, Ataxia, Atypical chest pain (04/19/2022), Balance disorder, Carotid stenosis (3/28/2024), Cataract, Cervical radiculopathy (11/11/2019), Cervical spinal cord compression, Chronic diastolic (congestive) heart failure, Chronic kidney disease, Chronic pancreatitis, Closed left subtrochanteric femur fracture (12/01/2020), Closed nondisplaced intertrochanteric fracture of left femur (12/01/2020), Colon polyps, Constipation, Contracture, right hand, Coronary artery disease, COVID-19 (07/2022), DDD (degenerative disc disease), cervical, Diabetes mellitus, type II, Diabetic retinopathy, Difficulty walking, Dysphagia, Elevated brain natriuretic peptide (BNP) level (08/2014), Elevated LFTs (03/2021), Erectile dysfunction, Fissure, anal (2022), Foot drop, Fuchs' corneal dystrophy of right eye, Glaucoma, History of alcohol abuse, Hyperlipidemia, Hypersomnia, Hypertension, Hypertensive urgency (02/25/2020), Insomnia, Kidney stones, LV dysfunction (06/2016), Lyme disease, Lymphadenopathy syndrome (05/2021), Macular edema, Myocardial infarction (11/05/2019), Myocardial infarction (07/12/2019), Neuropathy in diabetes, Non-celiac gluten sensitivity, Orthostasis, Osteoporosis, Oxygen dependent, PAD (peripheral artery disease), PNA (pneumonia) (06/2016), Polyneuropathy, PTSD (post-traumatic stress disorder), Pulmonary hypertension, Pulmonary nodule, Senile ectropion of both lower eyelids (02/2022), Sepsis (12/16/2020), Sleep apnea, Spinal stenosis in cervical region, Stroke (2012), Syncope and collapse (07/06/2019), Urinary retention (04/05/2021), Vision loss, and Vitamin D deficiency.    Allergies as of 05/30/2024  - Reviewed 05/30/2024   Allergen Reaction Noted    Ace inhibitors Angioedema 10/27/2015    Angiotensin receptor blockers Angioedema and Unknown (See Comments) 03/01/2016    Dapagliflozin Other (See Comments) 09/16/2023    Losartan Angioedema 03/01/2016    Seroquel [quetiapine] Hallucinations 06/16/2021    Xanax [alprazolam] Unknown - High Severity 01/09/2021    Amlodipine Swelling 03/01/2020    Ativan [lorazepam] Hallucinations 07/15/2019    Baclofen Hallucinations 06/05/2023    Misc. sulfonamide containing compounds Unknown (See Comments) 11/27/2023    Minoxidil Confusion 08/13/2019    Tetracycline Rash 09/19/2014       Medication information was obtained from: Patient/Spouse  Pharmacy and Phone Number:     Prior to Admission Medications       Prescriptions Last Dose Informant Patient Reported? Taking?    aspirin 81 MG EC tablet 5/29/2024 Spouse/Significant Other Yes Yes    Take 1 tablet by mouth Daily.    brimonidine (ALPHAGAN) 0.2 % ophthalmic solution Past Week Spouse/Significant Other, Pharmacy Yes Yes    Administer 1 drop to both eyes 2 (Two) Times a Day.    bumetanide (BUMEX) 1 MG tablet 5/29/2024 Spouse/Significant Other, Pharmacy Yes Yes    Take 1 tablet by mouth Daily.    Cholecalciferol (Vitamin D-3) 125 MCG (5000 UT) tablet 5/29/2024 Self Yes Yes    Take 1 tablet by mouth Daily.    DULoxetine (CYMBALTA) 30 MG capsule 5/29/2024 Pharmacy, Self Yes Yes    Take 1 capsule by mouth Daily.    Insulin Glargine, 2 Unit Dial, (TOUJEO) 300 UNIT/ML solution pen-injector injection 5/29/2024 Pharmacy, Self Yes Yes    Inject 24 Units under the skin into the appropriate area as directed Daily.    insulin lispro (humaLOG) 100 UNIT/ML injection 5/30/2024 Pharmacy No Yes    Inject 0-14 Units under the skin into the appropriate area as directed 3 (Three) Times a Day Before Meals.    Patient taking differently:  Inject 0-14 Units under the skin into the appropriate area as directed 3 (Three) Times a Day Before Meals. SLIDING  SCALE    latanoprost (XALATAN) 0.005 % ophthalmic solution Past Week Spouse/Significant Other, Pharmacy Yes Yes    Administer 1 drop to both eyes every night at bedtime.    multivitamin with minerals (MULTIVITAMIN ADULTS PO) 5/29/2024 Pharmacy Yes Yes    Take 1 tablet by mouth Daily.    testosterone (ANDROGEL) 25 MG/2.5GM (1%) gel gel Past Week Spouse/Significant Other Yes Yes    Place 25 mg on the skin as directed by provider 2 (Two) Times a Week.    traMADol (ULTRAM) 50 MG tablet 5/29/2024 Spouse/Significant Other Yes Yes    Take 1 tablet by mouth At Night As Needed.    Magnesium 400 MG capsule More than a month Self Yes No    Take 400 mg by mouth As Needed.    sildenafil (REVATIO) 20 MG tablet More than a month Spouse/Significant Other Yes No    Take 1 tablet by mouth As Needed.              Medication notes:     This medication list is complete to the best of my knowledge as of 5/30/2024    Please call if questions.    Rubio Webster  Medication History Technician  [phone]    5/30/2024 08:50 EDT

## 2024-05-30 NOTE — ED NOTES
Nursing report ED to floor  Dilip Verma  74 y.o.  male    HPI :  HPI (Adult)  Stated Reason for Visit: AMS  History Obtained From: family  Duration (Days): 2330    Chief Complaint  Chief Complaint   Patient presents with    Altered Mental Status       Admitting doctor:   Jaquan Grossman MD    Admitting diagnosis:   The primary encounter diagnosis was Acute respiratory failure with hypoxia. Diagnoses of Sepsis without acute organ dysfunction, due to unspecified organism, Hyperkalemia, Metabolic encephalopathy, Acute UTI, and Hyperglycemia due to diabetes mellitus were also pertinent to this visit.    Code status:   Current Code Status       Date Active Code Status Order ID Comments User Context       Prior            Allergies:   Ace inhibitors, Angiotensin receptor blockers, Dapagliflozin, Losartan, Seroquel [quetiapine], Xanax [alprazolam], Amlodipine, Ativan [lorazepam], Baclofen, Misc. sulfonamide containing compounds, Minoxidil, and Tetracycline    Isolation:   No active isolations    Intake and Output  No intake or output data in the 24 hours ending 05/30/24 0939    Weight:       05/30/24  0350   Weight: 86.6 kg (191 lb)       Most recent vitals:   Vitals:    05/30/24 0626 05/30/24 0638 05/30/24 0707 05/30/24 0824   BP:  105/55  158/78   BP Location:    Right arm   Patient Position:    Lying   Pulse: 93 96 92 93   Resp:    18   Temp:       TempSrc:       SpO2:  94% 95% 94%   Weight:       Height:           Active LDAs/IV Access:   Lines, Drains & Airways       Active LDAs       Name Placement date Placement time Site Days    Peripheral IV 05/30/24 0433 Left Antecubital 05/30/24  0433  Antecubital  less than 1                    Labs (abnormal labs have a star):   Labs Reviewed   COMPREHENSIVE METABOLIC PANEL - Abnormal; Notable for the following components:       Result Value    Glucose 306 (*)     BUN 24 (*)     Potassium 5.5 (*)     CO2 29.2 (*)     Total Bilirubin 1.3 (*)     All other  components within normal limits    Narrative:     GFR Normal >60  Chronic Kidney Disease <60  Kidney Failure <15    The GFR formula is only valid for adults with stable renal function between ages 18 and 70.   URINALYSIS W/ CULTURE IF INDICATED - Abnormal; Notable for the following components:    Color, UA Dark Yellow (*)     Appearance, UA Turbid (*)     Glucose,  mg/dL (Trace) (*)     Ketones, UA 15 mg/dL (1+) (*)     Blood, UA Small (1+) (*)     Protein,  mg/dL (2+) (*)     Leuk Esterase, UA Large (3+) (*)     Nitrite, UA Positive (*)     All other components within normal limits    Narrative:     In absence of clinical symptoms, the presence of pyuria, bacteria, and/or nitrites on the urinalysis result does not correlate with infection.   TROPONIN - Abnormal; Notable for the following components:    HS Troponin T 40 (*)     All other components within normal limits    Narrative:     High Sensitive Troponin T Reference Range:  <14.0 ng/L- Negative Female for AMI  <22.0 ng/L- Negative Male for AMI  >=14 - Abnormal Female indicating possible myocardial injury.  >=22 - Abnormal Male indicating possible myocardial injury.   Clinicians would have to utilize clinical acumen, EKG, Troponin, and serial changes to determine if it is an Acute Myocardial Infarction or myocardial injury due to an underlying chronic condition.        LACTIC ACID, PLASMA - Abnormal; Notable for the following components:    Lactate 2.2 (*)     All other components within normal limits   PROCALCITONIN - Abnormal; Notable for the following components:    Procalcitonin 0.95 (*)     All other components within normal limits    Narrative:     As a Marker for Sepsis (Non-Neonates):    1. <0.5 ng/mL represents a low risk of severe sepsis and/or septic shock.  2. >2 ng/mL represents a high risk of severe sepsis and/or septic shock.    As a Marker for Lower Respiratory Tract Infections that require antibiotic therapy:    PCT on Admission     "Antibiotic Therapy       6-12 Hrs later    >0.5                Strongly Recommended  >0.25 - <0.5        Recommended   0.1 - 0.25          Discouraged              Remeasure/reassess PCT  <0.1                Strongly Discouraged     Remeasure/reassess PCT    As 28 day mortality risk marker: \"Change in Procalcitonin Result\" (>80% or <=80%) if Day 0 (or Day 1) and Day 4 values are available. Refer to [email or web address]    Change in PCT <=80%  A decrease of PCT levels below or equal to 80% defines a positive change in PCT test result representing a higher risk for 28-day all-cause mortality of patients diagnosed with severe sepsis for septic shock.    Change in PCT >80%  A decrease of PCT levels of more than 80% defines a negative change in PCT result representing a lower risk for 28-day all-cause mortality of patients diagnosed with severe sepsis or septic shock.      CBC WITH AUTO DIFFERENTIAL - Abnormal; Notable for the following components:    WBC 30.36 (*)     Neutrophil % 90.7 (*)     Lymphocyte % 1.7 (*)     Eosinophil % 0.0 (*)     Immature Grans % 1.0 (*)     Neutrophils, Absolute 27.54 (*)     Lymphocytes, Absolute 0.52 (*)     Monocytes, Absolute 1.93 (*)     Immature Grans, Absolute 0.31 (*)     All other components within normal limits   BLOOD GAS, ARTERIAL - Abnormal; Notable for the following components:    pO2, Arterial 56.9 (*)     HCO3, Arterial 28.5 (*)     Base Excess, Arterial 3.3 (*)     O2 Saturation, Arterial 89.5 (*)     CO2 Content 29.9 (*)     All other components within normal limits   BNP (IN-HOUSE) - Abnormal; Notable for the following components:    proBNP 2,034.0 (*)     All other components within normal limits    Narrative:     This assay is used as an aid in the diagnosis of individuals suspected of having heart failure. It can be used as an aid in the diagnosis of acute decompensated heart failure (ADHF) in patients presenting with signs and symptoms of ADHF to the " emergency department (ED). In addition, NT-proBNP of <300 pg/mL indicates ADHF is not likely.    Age Range Result Interpretation  NT-proBNP Concentration (pg/mL:      <50             Positive            >450                   Gray                 300-450                    Negative             <300    50-75           Positive            >900                  Gray                300-900                  Negative            <300      >75             Positive            >1800                  Gray                300-1800                  Negative            <300   HIGH SENSITIVITIY TROPONIN T 2HR - Abnormal; Notable for the following components:    HS Troponin T 38 (*)     All other components within normal limits    Narrative:     High Sensitive Troponin T Reference Range:  <14.0 ng/L- Negative Female for AMI  <22.0 ng/L- Negative Male for AMI  >=14 - Abnormal Female indicating possible myocardial injury.  >=22 - Abnormal Male indicating possible myocardial injury.   Clinicians would have to utilize clinical acumen, EKG, Troponin, and serial changes to determine if it is an Acute Myocardial Infarction or myocardial injury due to an underlying chronic condition.        URINALYSIS, MICROSCOPIC ONLY - Abnormal; Notable for the following components:    RBC, UA 3-5 (*)     WBC, UA Too Numerous to Count (*)     Bacteria, UA 4+ (*)     All other components within normal limits   POCT GLUCOSE FINGERSTICK - Abnormal; Notable for the following components:    Glucose 264 (*)     All other components within normal limits   POCT GLUCOSE FINGERSTICK - Abnormal; Notable for the following components:    Glucose 228 (*)     All other components within normal limits   PROTIME-INR - Normal   APTT - Normal   D-DIMER, QUANTITATIVE - Normal    Narrative:     According to the assay 's published package insert, a normal (<0.50 MCGFEU/mL) D-dimer result in conjunction with a non-high clinical probability assessment, excludes deep  "vein thrombosis (DVT) and pulmonary embolism (PE) with high sensitivity.    D-dimer values increase with age and this can make VTE exclusion of an older population difficult. To address this, the [employer], based on best available evidence and recent guidelines, recommends that clinicians use age-adjusted D-dimer thresholds in patients greater than 50 years of age with: a) a low probability of PE who do not meet all Pulmonary Embolism Rule Out Criteria, or b) in those with intermediate probability of PE.   The formula for an age-adjusted D-dimer cut-off is \"age/100\".  For example, a 60 year old patient would have an age-adjusted cut-off of 0.60 MCGFEU/mL and an 80 year old 0.80 MCGFEU/mL.   MAGNESIUM - Normal   AMMONIA - Normal   LACTIC ACID, REFLEX - Normal   BLOOD CULTURE   BLOOD CULTURE   URINE CULTURE   BASIC METABOLIC PANEL   POCT GLUCOSE FINGERSTICK   CBC AND DIFFERENTIAL    Narrative:     The following orders were created for panel order CBC & Differential.  Procedure                               Abnormality         Status                     ---------                               -----------         ------                     CBC Auto Differential[[id number]]        Abnormal            Final result                 Please view results for these tests on the individual orders.   EXTRA TUBES    Narrative:     The following orders were created for panel order Extra Tubes.  Procedure                               Abnormality         Status                     ---------                               -----------         ------                     Red Top[[id number]]                                          Final result                 Please view results for these tests on the individual orders.   RED TOP       EKG:   ECG 12 Lead Altered Mental Status   Preliminary Result   HEART RATE= 101  bpm   RR Interval= 594  ms   KS Interval= 164  ms   P Horizontal Axis= -3  deg   P Front Axis= 38  deg "   QRSD Interval= 117  ms   QT Interval= 370  ms   QTcB= 480  ms   QRS Axis= 99  deg   T Wave Axis= -89  deg   - ABNORMAL ECG -   Sinus tachycardia   Left atrial enlargement   Incomplete right bundle branch block   Repol abnrm suggests ischemia, diffuse leads   Electronically Signed By:    Date and Time of Study: 2024-05-30 04:40:15          Meds given in ED:   Medications   vancomycin 1750 mg/500 mL 0.9% NS IVPB ([facility]) (0 mg Intravenous Stopped 5/30/24 0809)   piperacillin-tazobactam (ZOSYN) 3.375 g IVPB in 100 mL NS MBP (CD) (0 g Intravenous Stopped 5/30/24 0607)   sodium chloride 0.9 % bolus 1,000 mL (0 mL Intravenous Stopped 5/30/24 0809)   insulin regular (humuLIN R,novoLIN R) injection 5 Units (5 Units Intravenous Given 5/30/24 0635)   iopamidol (ISOVUE-300) 61 % injection 100 mL (100 mL Intravenous Given 5/30/24 0547)       Imaging results:  CT Abdomen Pelvis With Contrast    Result Date: 5/30/2024   1. Diffuse urinary bladder wall thickening and mild perivesical fat stranding, which could be accentuated by under distention but could reflect a nonspecific cystitis but recommend correlating with urinalysis. 2. Low lung volumes with multifocal bilateral perihilar and bibasilar opacities that likely represent subsegmental atelectasis and/or scarring. 3. Likely benign 9 mm subpleural nodule in the base of the lingula and likely reactive mediastinal and bilateral hilar lymphadenopathy given the long-term stability. 4. Nonspecific fat stranding in the left inguinal region. Correlate for recent trauma or instrumentation. 5. Chronic appearing splenic vein thrombosis with peripheral calcification and multiple venous collaterals in the left upper quadrant. Additional pancreatic/peripancreatic calcifications could reflect sequela of chronic pancreatitis. 6. Mildly heterogeneous hepatic attenuation with subtle nodular liver contours, which could reflect underlying hepatocellular disease/cirrhosis. 7. Tiny hiatal hernia  with mild nonspecific thickening of the distal esophageal wall and mild gastric wall thickening that could be accentuated by under distention. Correlate for GERD and possible gastritis.  This report was finalized on 5/30/2024 7:35 AM by Julio Thomas MD on Workstation: [id number]      CT Chest With Contrast Diagnostic    Result Date: 5/30/2024   1. Diffuse urinary bladder wall thickening and mild perivesical fat stranding, which could be accentuated by under distention but could reflect a nonspecific cystitis but recommend correlating with urinalysis. 2. Low lung volumes with multifocal bilateral perihilar and bibasilar opacities that likely represent subsegmental atelectasis and/or scarring. 3. Likely benign 9 mm subpleural nodule in the base of the lingula and likely reactive mediastinal and bilateral hilar lymphadenopathy given the long-term stability. 4. Nonspecific fat stranding in the left inguinal region. Correlate for recent trauma or instrumentation. 5. Chronic appearing splenic vein thrombosis with peripheral calcification and multiple venous collaterals in the left upper quadrant. Additional pancreatic/peripancreatic calcifications could reflect sequela of chronic pancreatitis. 6. Mildly heterogeneous hepatic attenuation with subtle nodular liver contours, which could reflect underlying hepatocellular disease/cirrhosis. 7. Tiny hiatal hernia with mild nonspecific thickening of the distal esophageal wall and mild gastric wall thickening that could be accentuated by under distention. Correlate for GERD and possible gastritis.  This report was finalized on 5/30/2024 7:35 AM by Julio Thomas MD on Workstation: [id number]      CT Head Without Contrast    Result Date: 5/30/2024   No acute process identified.    Radiation dose reduction techniques were utilized, including automated exposure control and exposure modulation based on body size.       XR Chest 1 View    Result Date: 5/30/2024  Nonspecific bibasilar  consolidation.  This report was finalized on 2024 4:47 AM by Dr. Gayle Okeefe M.D on Workstation: [id number]       Ambulatory status:   - assist x 2    Social issues:   Social History     Socioeconomic History    Marital status:    Tobacco Use    Smoking status: Former     Current packs/day: 0.00     Average packs/day: 0.8 packs/day for 16.0 years (12.0 ttl pk-yrs)     Types: Cigarettes     Start date: 1984     Quit date: 2000     Years since quittin.4     Passive exposure: Past    Smokeless tobacco: Never    Tobacco comments:     Start age:39/Stopping age:47, 3/4 PPD   Vaping Use    Vaping status: Never Used   Substance and Sexual Activity    Alcohol use: Not Currently     Comment: none since     Drug use: No    Sexual activity: Not Currently     Birth control/protection: Vasectomy       Peripheral Neurovascular  Peripheral Neurovascular (Adult)  Peripheral Neurovascular WDL: neurovascular assessment lower  Capillary Refill, General: less than/equal to 3 secs  Additional Documentation: Edema (Group)  Edema  Edema: leg, left, leg, right, ankle, right, foot, right, foot, left  Leg, Left Edema: 1+ (Trace)  Leg, Right Edema: 1+ (Trace)  Ankle, Right Edema: 1+ (Trace)  Foot, Left Edema: 1+ (Trace)  Foot, Right Edema: 1+ (Trace)  LLE Neurovascular Assessment  Temperature LLE: warm  Color LLE: red  Sensation LLE: tenderness present  RLE Neurovascular Assessment  Temperature RLE: warm  Color RLE: red  Sensation RLE: tenderness present    Neuro Cognitive  Neuro Cognitive (Adult)  Cognitive/Neuro/Behavioral WDL: .WDL except, level of consciousness, orientation  Level of Consciousness: Alert  Orientation: disoriented to, time    Learning  Learning Assessment (Adult)  Learning Readiness and Ability: cognitive limitation noted    Respiratory  Respiratory WDL  Respiratory WDL: WDL    Abdominal Pain       Pain Assessments  Pain (Adult)  (0-10) Pain Rating: Rest: 0  (0-10) Pain Rating: Activity:  0    NIH Stroke Scale       Fatmata Singletary RN  05/30/24 09:39 EDT

## 2024-05-30 NOTE — ED PROVIDER NOTES
EMERGENCY DEPARTMENT ENCOUNTER    Room number:  N636/1  Date Seen:  5/30/2024  Time of transfer:0600  PCP:  Fernando Camargo DO    Laboratory Results:  Recent Results (from the past 24 hour(s))   Comprehensive Metabolic Panel    Collection Time: 05/30/24  4:33 AM    Specimen: Blood   Result Value Ref Range    Glucose 306 (H) 65 - 99 mg/dL    BUN 24 (H) 8 - 23 mg/dL    Creatinine 1.17 0.76 - 1.27 mg/dL    Sodium 138 136 - 145 mmol/L    Potassium 5.5 (H) 3.5 - 5.2 mmol/L    Chloride 98 98 - 107 mmol/L    CO2 29.2 (H) 22.0 - 29.0 mmol/L    Calcium 9.6 8.6 - 10.5 mg/dL    Total Protein 7.4 6.0 - 8.5 g/dL    Albumin 4.2 3.5 - 5.2 g/dL    ALT (SGPT) 31 1 - 41 U/L    AST (SGOT) 33 1 - 40 U/L    Alkaline Phosphatase 86 39 - 117 U/L    Total Bilirubin 1.3 (H) 0.0 - 1.2 mg/dL    Globulin 3.2 gm/dL    A/G Ratio 1.3 g/dL    BUN/Creatinine Ratio 20.5 7.0 - 25.0    Anion Gap 10.8 5.0 - 15.0 mmol/L    eGFR 65.0 >60.0 mL/min/1.73   Protime-INR    Collection Time: 05/30/24  4:33 AM    Specimen: Blood   Result Value Ref Range    Protime 14.1 11.7 - 14.2 Seconds    INR 1.07 0.90 - 1.10   aPTT    Collection Time: 05/30/24  4:33 AM    Specimen: Blood   Result Value Ref Range    PTT 28.0 22.7 - 35.4 seconds   High Sensitivity Troponin T    Collection Time: 05/30/24  4:33 AM    Specimen: Blood   Result Value Ref Range    HS Troponin T 40 (H) <22 ng/L   D-dimer, Quantitative    Collection Time: 05/30/24  4:33 AM    Specimen: Blood   Result Value Ref Range    D-Dimer, Quantitative 0.57 0.00 - 0.75 MCGFEU/mL   Lactic Acid, Plasma    Collection Time: 05/30/24  4:33 AM    Specimen: Blood   Result Value Ref Range    Lactate 2.2 (C) 0.5 - 2.0 mmol/L   Procalcitonin    Collection Time: 05/30/24  4:33 AM    Specimen: Blood   Result Value Ref Range    Procalcitonin 0.95 (H) 0.00 - 0.25 ng/mL   Magnesium    Collection Time: 05/30/24  4:33 AM    Specimen: Blood   Result Value Ref Range    Magnesium 1.9 1.6 - 2.4 mg/dL   Ammonia    Collection Time:  05/30/24  4:33 AM    Specimen: Blood   Result Value Ref Range    Ammonia 20 16 - 60 umol/L   CBC Auto Differential    Collection Time: 05/30/24  4:33 AM    Specimen: Blood   Result Value Ref Range    WBC 30.36 (C) 3.40 - 10.80 10*3/mm3    RBC 5.29 4.14 - 5.80 10*6/mm3    Hemoglobin 16.4 13.0 - 17.7 g/dL    Hematocrit 50.4 37.5 - 51.0 %    MCV 95.3 79.0 - 97.0 fL    MCH 31.0 26.6 - 33.0 pg    MCHC 32.5 31.5 - 35.7 g/dL    RDW 13.0 12.3 - 15.4 %    RDW-SD 46.3 37.0 - 54.0 fl    MPV 11.0 6.0 - 12.0 fL    Platelets 230 140 - 450 10*3/mm3    Neutrophil % 90.7 (H) 42.7 - 76.0 %    Lymphocyte % 1.7 (L) 19.6 - 45.3 %    Monocyte % 6.4 5.0 - 12.0 %    Eosinophil % 0.0 (L) 0.3 - 6.2 %    Basophil % 0.2 0.0 - 1.5 %    Immature Grans % 1.0 (H) 0.0 - 0.5 %    Neutrophils, Absolute 27.54 (H) 1.70 - 7.00 10*3/mm3    Lymphocytes, Absolute 0.52 (L) 0.70 - 3.10 10*3/mm3    Monocytes, Absolute 1.93 (H) 0.10 - 0.90 10*3/mm3    Eosinophils, Absolute 0.00 0.00 - 0.40 10*3/mm3    Basophils, Absolute 0.06 0.00 - 0.20 10*3/mm3    Immature Grans, Absolute 0.31 (H) 0.00 - 0.05 10*3/mm3   BNP    Collection Time: 05/30/24  4:33 AM    Specimen: Blood   Result Value Ref Range    proBNP 2,034.0 (H) 0.0 - 1,800.0 pg/mL   ECG 12 Lead Altered Mental Status    Collection Time: 05/30/24  4:40 AM   Result Value Ref Range    QT Interval 370 ms    QTC Interval 480 ms   Red Top    Collection Time: 05/30/24  4:43 AM   Result Value Ref Range    Extra Tube Hold for add-ons.    Blood Gas, Arterial -    Collection Time: 05/30/24  5:05 AM    Specimen: Arterial Blood   Result Value Ref Range    Site Right Brachial     Quinn's Test N/A     pH, Arterial 7.420 7.350 - 7.450 pH units    pCO2, Arterial 44.0 35.0 - 45.0 mm Hg    pO2, Arterial 56.9 (L) 80.0 - 100.0 mm Hg    HCO3, Arterial 28.5 (H) 22.0 - 28.0 mmol/L    Base Excess, Arterial 3.3 (H) 0.0 - 2.0 mmol/L    O2 Saturation, Arterial 89.5 (L) 92.0 - 98.5 %    CO2 Content 29.9 (H) 23 - 27 mmol/L    Barometric  Pressure for Blood Gas 752.9000 mmHg    Modality Cannula     Flow Rate 3.5000 lpm    Rate 18 Breaths/minute    Hemodilution No     Device Comment SpO2 92%    Urinalysis With Culture If Indicated - Urine, Clean Catch    Collection Time: 05/30/24  5:38 AM    Specimen: Urine, Clean Catch   Result Value Ref Range    Color, UA Dark Yellow (A) Yellow, Straw    Appearance, UA Turbid (A) Clear    pH, UA 6.0 5.0 - 8.0    Specific Gravity, UA 1.022 1.005 - 1.030    Glucose,  mg/dL (Trace) (A) Negative    Ketones, UA 15 mg/dL (1+) (A) Negative    Bilirubin, UA Negative Negative    Blood, UA Small (1+) (A) Negative    Protein,  mg/dL (2+) (A) Negative    Leuk Esterase, UA Large (3+) (A) Negative    Nitrite, UA Positive (A) Negative    Urobilinogen, UA 1.0 E.U./dL 0.2 - 1.0 E.U./dL   Urinalysis, Microscopic Only - Urine, Clean Catch    Collection Time: 05/30/24  5:38 AM    Specimen: Urine, Clean Catch   Result Value Ref Range    RBC, UA 3-5 (A) None Seen, 0-2 /HPF    WBC, UA Too Numerous to Count (A) None Seen, 0-2 /HPF    Bacteria, UA 4+ (A) None Seen /HPF    Squamous Epithelial Cells, UA 0-2 None Seen, 0-2 /HPF    Hyaline Casts, UA None Seen None Seen /LPF    Methodology Automated Microscopy    POC Glucose Once    Collection Time: 05/30/24  6:32 AM    Specimen: Blood   Result Value Ref Range    Glucose 264 (H) 70 - 130 mg/dL   High Sensitivity Troponin T 2Hr    Collection Time: 05/30/24  6:35 AM    Specimen: Blood   Result Value Ref Range    HS Troponin T 38 (H) <22 ng/L    Troponin T Delta -2 >=-4 - <+4 ng/L   STAT Lactic Acid, Reflex    Collection Time: 05/30/24  8:24 AM    Specimen: Arm, Left; Blood   Result Value Ref Range    Lactate 2.0 0.5 - 2.0 mmol/L   POC Glucose Once    Collection Time: 05/30/24  9:04 AM    Specimen: Blood   Result Value Ref Range    Glucose 228 (H) 70 - 130 mg/dL   Basic Metabolic Panel    Collection Time: 05/30/24  9:55 AM    Specimen: Arm, Right; Blood   Result Value Ref Range     Glucose 228 (H) 65 - 99 mg/dL    BUN 22 8 - 23 mg/dL    Creatinine 0.90 0.76 - 1.27 mg/dL    Sodium 140 136 - 145 mmol/L    Potassium 4.0 3.5 - 5.2 mmol/L    Chloride 107 98 - 107 mmol/L    CO2 24.6 22.0 - 29.0 mmol/L    Calcium 7.3 (L) 8.6 - 10.5 mg/dL    BUN/Creatinine Ratio 24.4 7.0 - 25.0    Anion Gap 8.4 5.0 - 15.0 mmol/L    eGFR 89.1 >60.0 mL/min/1.73   POC Glucose Once    Collection Time: 05/30/24  1:09 PM    Specimen: Blood   Result Value Ref Range    Glucose 244 (H) 70 - 130 mg/dL   POC Glucose Once    Collection Time: 05/30/24  4:07 PM    Specimen: Blood   Result Value Ref Range    Glucose 302 (H) 70 - 130 mg/dL     I reviewed the above results.    Radiology:  CT Abdomen Pelvis With Contrast, CT Chest With Contrast Diagnostic    Result Date: 5/30/2024  CT CHEST W CONTRAST DIAGNOSTIC-, CT ABDOMEN PELVIS W CONTRAST-  DATE OF EXAM: 5/30/2024 5:45 AM  INDICATION: Significant leukocytosis of uncertain etiology. Acute respiratory failure with hypoxia. Sepsis. Altered mental status. History of perianal abscess.  COMPARISON: Chest radiograph 5/30/2024 and 10/4/2023. MRI lumbar spine 10/5/2023. CT chest 4/19/2022. Pelvis radiograph 1/11/2022.  TECHNIQUE: Multiple contiguous axial images were acquired through the chest, abdomen, and pelvis following the intravenous administration of 100 mL of Isovue-300. Reformatted coronal and sagittal sequences were also reviewed. Radiation dose reduction techniques were utilized, including automated exposure control and exposure modulation based on body size.  FINDINGS: CT CHEST: Low lung volumes with bilateral perihilar and bibasilar opacities most consistent with subsegmental atelectasis and/or scarring. Mild bilateral dependent atelectasis. Stable chronic 9 mm subpleural nodule in the anterior base of the lingula with some peripheral calcification (axial series 3 image 59), likely benign given the long-term stability. Calcified and prior granulomatous disease in both lungs. No  focal lung consolidation. Mild chronic emphysematous changes in both lungs. The central airways are widely patent. No pneumothorax or pleural effusion.  The heart is normal in size. Severe calcified coronary artery disease with postoperative changes from CABG. No pericardial effusion. Calcified atherosclerotic disease in the thoracic aorta without aneurysm. Mild enlargement of the main pulmonary artery, suggesting pulmonary arterial hypertension. Calcified remnants of prior granulomatous disease in the mediastinum and hilum. Mildly enlarged mediastinal lymph nodes with an index left lower paratracheal lymph node measuring 2 cm x 1 cm (axial series 2 image 36), possibly reactive and not significantly changed. Small hiatal hernia.  Flowing multilevel mid and lower thoracic anterior osteophyte formation and mild upper thoracic scoliosis. Sternotomy wires. Partially imaged chronic changes in both shoulders. No acute osseous abnormality or concerning osseous lesion.  CT ABDOMEN AND PELVIS: Mildly heterogeneous hepatic attenuation with subtle nodular liver contours, which could reflect underlying hepatocellular disease/cirrhosis. The gallbladder and spleen are unremarkable. Suspected chronic splenic vein thrombosis with peripheral calcification and numerous collaterals in the left upper quadrant. Additional pancreatic/peripancreatic calcifications could reflect sequela of chronic pancreatitis. The adrenal glands and kidneys are unremarkable. The urinary bladder is nondistended. Diffuse urinary bladder wall thickening and mild perivesical fat stranding, which could accentuated under distention but could also reflect cystitis. Dystrophic appearing calcifications in the prostate gland.  Tiny hiatal hernia with mild nonspecific thickening of the distal esophageal wall. Mild gastric wall thickening could be accentuated by under distention. Moderate colonic stool. No bowel obstruction or significant bowel wall thickening. The  appendix is normal.  No free fluid in the abdomen or pelvis. No free intraperitoneal air. No pathologically enlarged lymph nodes in the abdomen or pelvis. Calcified atherosclerotic disease in the abdominal aorta and its distal branches without aneurysm.  Nonspecific left inguinal fat stranding. Small fat-containing umbilical and right inguinal hernias. Similar-appearing multilevel lumbar spondylosis with chronic/degenerative grade 1 retrolisthesis of L2 on L3 and L5 on S1. Partially imaged left JUDI. Partially imaged cerclage wire fixation of the partially healed periprosthetic fracture of the proximal left femur. Moderate right hip joint DJD. No acute osseous abnormality or concerning osseous lesion.       1. Diffuse urinary bladder wall thickening and mild perivesical fat stranding, which could be accentuated by under distention but could reflect a nonspecific cystitis but recommend correlating with urinalysis. 2. Low lung volumes with multifocal bilateral perihilar and bibasilar opacities that likely represent subsegmental atelectasis and/or scarring. 3. Likely benign 9 mm subpleural nodule in the base of the lingula and likely reactive mediastinal and bilateral hilar lymphadenopathy given the long-term stability. 4. Nonspecific fat stranding in the left inguinal region. Correlate for recent trauma or instrumentation. 5. Chronic appearing splenic vein thrombosis with peripheral calcification and multiple venous collaterals in the left upper quadrant. Additional pancreatic/peripancreatic calcifications could reflect sequela of chronic pancreatitis. 6. Mildly heterogeneous hepatic attenuation with subtle nodular liver contours, which could reflect underlying hepatocellular disease/cirrhosis. 7. Tiny hiatal hernia with mild nonspecific thickening of the distal esophageal wall and mild gastric wall thickening that could be accentuated by under distention. Correlate for GERD and possible gastritis.  This report was  finalized on 5/30/2024 7:35 AM by Julio Thomas MD on Workstation: [id number]      CT Head Without Contrast    Result Date: 5/30/2024  CT OF THE BRAIN WITHOUT CONTRAST 05/30/2024  HISTORY: Altered mental status.  Axial images were obtained through the brain without intravenous contrast.  There is mild-to-moderate diffuse atrophy. There is decreased attenuation of the periventricular white matter bilaterally consistent with small vessel white matter ischemic disease. There is no evidence of acute infarction, hemorrhage, midline shift or mass effect.  No bony abnormalities are seen.       No acute process identified.    Radiation dose reduction techniques were utilized, including automated exposure control and exposure modulation based on body size.       XR Chest 1 View    Result Date: 5/30/2024  SINGLE VIEW OF THE CHEST  HISTORY: Altered mental status  COMPARISON: October 4, 2023  FINDINGS: Cardiomegaly is present. Patient is status post median sternotomy with coronary artery bypass grafting. No pneumothorax or pleural effusion is seen. Linear scarring versus atelectasis is noted within the left midlung. Nonspecific bibasilar consolidation is noted.      Nonspecific bibasilar consolidation.  This report was finalized on 5/30/2024 4:47 AM by Dr. Gayle Okeefe M.D on Workstation: [id number]     I reviewed the above results    Medications ordered in ED:  Medications   sodium chloride 0.9 % flush 10 mL (10 mL Intravenous Given 5/30/24 1143)   sodium chloride 0.9 % flush 10 mL (has no administration in time range)   sodium chloride 0.9 % infusion 40 mL (has no administration in time range)   Enoxaparin Sodium (LOVENOX) syringe 40 mg (40 mg Subcutaneous Given 5/30/24 1136)   nitroglycerin (NITROSTAT) SL tablet 0.4 mg (has no administration in time range)   sodium chloride 0.9 % infusion (125 mL/hr Intravenous New Bag 5/30/24 1143)   cefTRIAXone (ROCEPHIN) 2,000 mg in sodium chloride 0.9 % 100 mL MBP (2,000 mg  Intravenous New Bag 5/30/24 1136)   acetaminophen (TYLENOL) tablet 650 mg (has no administration in time range)     Or   acetaminophen (TYLENOL) 160 MG/5ML oral solution 650 mg (has no administration in time range)     Or   acetaminophen (TYLENOL) suppository 650 mg (has no administration in time range)   sennosides-docusate (PERICOLACE) 8.6-50 MG per tablet 2 tablet (2 tablets Oral Not Given 5/30/24 1135)     And   polyethylene glycol (MIRALAX) packet 17 g (has no administration in time range)     And   bisacodyl (DULCOLAX) EC tablet 5 mg (has no administration in time range)     And   bisacodyl (DULCOLAX) suppository 10 mg (has no administration in time range)   ondansetron (ZOFRAN) injection 4 mg (has no administration in time range)   aspirin EC tablet 81 mg (has no administration in time range)   brimonidine (ALPHAGAN) 0.2 % ophthalmic solution 1 drop (1 drop Both Eyes Given 5/30/24 1358)   DULoxetine (CYMBALTA) DR capsule 30 mg (has no administration in time range)   insulin glargine (LANTUS, SEMGLEE) injection 24 Units (24 Units Subcutaneous Given 5/30/24 1358)   latanoprost (XALATAN) 0.005 % ophthalmic solution 1 drop (has no administration in time range)   dextrose (GLUTOSE) oral gel 15 g (has no administration in time range)   dextrose (D50W) (25 g/50 mL) IV injection 25 g (has no administration in time range)   glucagon (GLUCAGEN) injection 1 mg (has no administration in time range)   insulin lispro (HUMALOG/ADMELOG) injection 2-9 Units (4 Units Subcutaneous Given 5/30/24 1357)   OLANZapine (zyPREXA) tablet 5 mg (has no administration in time range)   OLANZapine (zyPREXA) injection 10 mg (10 mg Intramuscular Given 5/30/24 1358)   vancomycin 1750 mg/500 mL 0.9% NS IVPB ([facility]) (0 mg Intravenous Stopped 5/30/24 0809)   piperacillin-tazobactam (ZOSYN) 3.375 g IVPB in 100 mL NS MBP (CD) (0 g Intravenous Stopped 5/30/24 0607)   sodium chloride 0.9 % bolus 1,000 mL (0 mL Intravenous Stopped 5/30/24 0810)    insulin regular (humuLIN R,novoLIN R) injection 5 Units (5 Units Intravenous Given 5/30/24 0665)   iopamidol (ISOVUE-300) 61 % injection 100 mL (100 mL Intravenous Given 5/30/24 0547)       Progress and Consult Notes:  ED Course as of 05/30/24 1616   u May 30, 2024   8457 I discussed the case with Dr. Deshpande and they agree to evaluate the patient at the bedside.    [CC]   0438 SpO2(!): 85 %  Patient on 2L [CC]   0443 XR Chest 1 View  Independent to rotation of the chest x-ray is questionable left-sided infiltrate concerning for pneumonia [CC]   0454 WBC(!!): 30.36  Broad spectrum antibiotics ordered [CC]   0506 Lactate(!!): 2.2 [CC]   0511 D-Dimer, Quant: 0.57 [CC]   0511 Potassium(!): 5.5  Hydrating patient, insulin ordered [CC]   0511 pCO2, Arterial: 44.0 [CC]   0521 Procalcitonin(!): 0.95 [CC]   0530 proBNP(!): 2,034.0  At baseline [CC]   0813 Nitrite, UA(!): Positive [KA]   0813 Leukocytes, UA(!): Large (3+) [KA]   0813 Blood, UA(!): Small (1+) [KA]   0824 Bacteria, UA(!): 4+ [KA]   0825 WBC, UA(!): Too Numerous to Count [KA]   0919 I reassessed the patient multiple times over the course of his ER evaluation.  He remained awake and alert, no hypotension.  I counseled him and his wife at the bedside about all of his lab and imaging results, findings of sepsis related to acute urinary tract infection, all incidental findings, recommendations for admission and they are agreeable.  I discussed patient with Dr. Grossman, hospitalist including history presentation workup and he agrees to admit for further evaluation and treatment. [KA]      ED Course User Index  [CC] Kareen Izaguirre PA-C  [KA] Ivett Collazo PA-C       Diagnosis:  Final diagnoses:   Acute respiratory failure with hypoxia   Sepsis without acute organ dysfunction, due to unspecified organism   Hyperkalemia   Metabolic encephalopathy   Acute UTI   Hyperglycemia due to diabetes mellitus       Provider attestation:  I personally reviewed the past  medical history, past surgical history, social history, family history, current medications, and allergies as they appear in the chart.             Ivett Collazo PA-C  05/30/24 [id number]

## 2024-05-31 ENCOUNTER — APPOINTMENT (OUTPATIENT)
Dept: ULTRASOUND IMAGING | Facility: HOSPITAL | Age: 74
End: 2024-05-31
Payer: MEDICARE

## 2024-05-31 PROBLEM — N17.9 AKI (ACUTE KIDNEY INJURY): Status: ACTIVE | Noted: 2024-05-31

## 2024-05-31 LAB
ALBUMIN SERPL-MCNC: 3.3 G/DL (ref 3.5–5.2)
ALBUMIN/GLOB SERPL: 1.2 G/DL
ALP SERPL-CCNC: 60 U/L (ref 39–117)
ALT SERPL W P-5'-P-CCNC: 18 U/L (ref 1–41)
ANION GAP SERPL CALCULATED.3IONS-SCNC: 8 MMOL/L (ref 5–15)
AST SERPL-CCNC: 30 U/L (ref 1–40)
BACTERIA UR QL AUTO: ABNORMAL /HPF
BASOPHILS # BLD AUTO: 0.03 10*3/MM3 (ref 0–0.2)
BASOPHILS NFR BLD AUTO: 0.2 % (ref 0–1.5)
BILIRUB SERPL-MCNC: 0.6 MG/DL (ref 0–1.2)
BILIRUB UR QL STRIP: NEGATIVE
BUN SERPL-MCNC: 32 MG/DL (ref 8–23)
BUN/CREAT SERPL: 21.2 (ref 7–25)
CALCIUM SPEC-SCNC: 8.8 MG/DL (ref 8.6–10.5)
CHLORIDE SERPL-SCNC: 102 MMOL/L (ref 98–107)
CLARITY UR: ABNORMAL
CO2 SERPL-SCNC: 27 MMOL/L (ref 22–29)
COLOR UR: ABNORMAL
CREAT SERPL-MCNC: 1.51 MG/DL (ref 0.76–1.27)
CREAT UR-MCNC: 124.9 MG/DL
CREAT UR-MCNC: 128.5 MG/DL
DEPRECATED RDW RBC AUTO: 42.2 FL (ref 37–54)
EGFRCR SERPLBLD CKD-EPI 2021: 47.9 ML/MIN/1.73
EOSINOPHIL # BLD AUTO: 0.19 10*3/MM3 (ref 0–0.4)
EOSINOPHIL NFR BLD AUTO: 1.3 % (ref 0.3–6.2)
EOSINOPHIL SPEC QL MICRO: 0 % EOS/100 CELLS (ref 0–0)
ERYTHROCYTE [DISTWIDTH] IN BLOOD BY AUTOMATED COUNT: 12.6 % (ref 12.3–15.4)
GLOBULIN UR ELPH-MCNC: 2.8 GM/DL
GLUCOSE BLDC GLUCOMTR-MCNC: 124 MG/DL (ref 70–130)
GLUCOSE BLDC GLUCOMTR-MCNC: 135 MG/DL (ref 70–130)
GLUCOSE BLDC GLUCOMTR-MCNC: 146 MG/DL (ref 70–130)
GLUCOSE BLDC GLUCOMTR-MCNC: 84 MG/DL (ref 70–130)
GLUCOSE BLDC GLUCOMTR-MCNC: 87 MG/DL (ref 70–130)
GLUCOSE SERPL-MCNC: 97 MG/DL (ref 65–99)
GLUCOSE UR STRIP-MCNC: NEGATIVE MG/DL
HCT VFR BLD AUTO: 39.3 % (ref 37.5–51)
HGB BLD-MCNC: 13.4 G/DL (ref 13–17.7)
HGB UR QL STRIP.AUTO: ABNORMAL
HYALINE CASTS UR QL AUTO: ABNORMAL /LPF
IMM GRANULOCYTES # BLD AUTO: 0.05 10*3/MM3 (ref 0–0.05)
IMM GRANULOCYTES NFR BLD AUTO: 0.3 % (ref 0–0.5)
KETONES UR QL STRIP: ABNORMAL
LEUKOCYTE ESTERASE UR QL STRIP.AUTO: ABNORMAL
LYMPHOCYTES # BLD AUTO: 1.8 10*3/MM3 (ref 0.7–3.1)
LYMPHOCYTES NFR BLD AUTO: 12.5 % (ref 19.6–45.3)
MCH RBC QN AUTO: 31.4 PG (ref 26.6–33)
MCHC RBC AUTO-ENTMCNC: 34.1 G/DL (ref 31.5–35.7)
MCV RBC AUTO: 92 FL (ref 79–97)
MONOCYTES # BLD AUTO: 0.92 10*3/MM3 (ref 0.1–0.9)
MONOCYTES NFR BLD AUTO: 6.4 % (ref 5–12)
NEUTROPHILS NFR BLD AUTO: 11.39 10*3/MM3 (ref 1.7–7)
NEUTROPHILS NFR BLD AUTO: 79.3 % (ref 42.7–76)
NITRITE UR QL STRIP: NEGATIVE
NRBC BLD AUTO-RTO: 0 /100 WBC (ref 0–0.2)
OSMOLALITY UR: 584 MOSM/KG
PH UR STRIP.AUTO: <=5 [PH] (ref 5–8)
PLATELET # BLD AUTO: 184 10*3/MM3 (ref 140–450)
PMV BLD AUTO: 11.1 FL (ref 6–12)
POTASSIUM SERPL-SCNC: 4.4 MMOL/L (ref 3.5–5.2)
PROT ?TM UR-MCNC: 54.9 MG/DL
PROT SERPL-MCNC: 6.1 G/DL (ref 6–8.5)
PROT UR QL STRIP: ABNORMAL
PROT/CREAT UR: 439.6 MG/G CREA (ref 0–200)
PSA SERPL-MCNC: 1.03 NG/ML (ref 0–4)
QT INTERVAL: 370 MS
QTC INTERVAL: 480 MS
RBC # BLD AUTO: 4.27 10*6/MM3 (ref 4.14–5.8)
RBC # UR STRIP: ABNORMAL /HPF
REF LAB TEST METHOD: ABNORMAL
SODIUM SERPL-SCNC: 137 MMOL/L (ref 136–145)
SODIUM UR-SCNC: <20 MMOL/L
SP GR UR STRIP: 1.03 (ref 1–1.03)
SQUAMOUS #/AREA URNS HPF: ABNORMAL /HPF
TSH SERPL DL<=0.05 MIU/L-ACNC: 1.25 UIU/ML (ref 0.27–4.2)
URATE SERPL-MCNC: 5.6 MG/DL (ref 3.4–7)
UROBILINOGEN UR QL STRIP: ABNORMAL
WBC # UR STRIP: ABNORMAL /HPF
WBC NRBC COR # BLD AUTO: 14.38 10*3/MM3 (ref 3.4–10.8)

## 2024-05-31 PROCEDURE — 97166 OT EVAL MOD COMPLEX 45 MIN: CPT

## 2024-05-31 PROCEDURE — 25010000002 ONDANSETRON PER 1 MG: Performed by: INTERNAL MEDICINE

## 2024-05-31 PROCEDURE — 84300 ASSAY OF URINE SODIUM: CPT | Performed by: HOSPITALIST

## 2024-05-31 PROCEDURE — 83935 ASSAY OF URINE OSMOLALITY: CPT | Performed by: HOSPITALIST

## 2024-05-31 PROCEDURE — 25010000002 CEFTRIAXONE PER 250 MG: Performed by: INTERNAL MEDICINE

## 2024-05-31 PROCEDURE — 25010000002 OLANZAPINE 10 MG RECONSTITUTED SOLUTION: Performed by: INTERNAL MEDICINE

## 2024-05-31 PROCEDURE — 76775 US EXAM ABDO BACK WALL LIM: CPT

## 2024-05-31 PROCEDURE — 63710000001 INSULIN GLARGINE PER 5 UNITS: Performed by: INTERNAL MEDICINE

## 2024-05-31 PROCEDURE — 82570 ASSAY OF URINE CREATININE: CPT | Performed by: HOSPITALIST

## 2024-05-31 PROCEDURE — 82948 REAGENT STRIP/BLOOD GLUCOSE: CPT

## 2024-05-31 PROCEDURE — 97162 PT EVAL MOD COMPLEX 30 MIN: CPT

## 2024-05-31 PROCEDURE — 85025 COMPLETE CBC W/AUTO DIFF WBC: CPT | Performed by: INTERNAL MEDICINE

## 2024-05-31 PROCEDURE — 80053 COMPREHEN METABOLIC PANEL: CPT | Performed by: INTERNAL MEDICINE

## 2024-05-31 PROCEDURE — 87205 SMEAR GRAM STAIN: CPT | Performed by: HOSPITALIST

## 2024-05-31 PROCEDURE — 25010000002 ENOXAPARIN PER 10 MG: Performed by: INTERNAL MEDICINE

## 2024-05-31 PROCEDURE — 84443 ASSAY THYROID STIM HORMONE: CPT | Performed by: HOSPITALIST

## 2024-05-31 PROCEDURE — 84550 ASSAY OF BLOOD/URIC ACID: CPT | Performed by: HOSPITALIST

## 2024-05-31 PROCEDURE — 81001 URINALYSIS AUTO W/SCOPE: CPT | Performed by: HOSPITALIST

## 2024-05-31 PROCEDURE — 97110 THERAPEUTIC EXERCISES: CPT

## 2024-05-31 PROCEDURE — 36415 COLL VENOUS BLD VENIPUNCTURE: CPT | Performed by: INTERNAL MEDICINE

## 2024-05-31 PROCEDURE — 97530 THERAPEUTIC ACTIVITIES: CPT

## 2024-05-31 PROCEDURE — 84153 ASSAY OF PSA TOTAL: CPT | Performed by: HOSPITALIST

## 2024-05-31 PROCEDURE — 84156 ASSAY OF PROTEIN URINE: CPT | Performed by: HOSPITALIST

## 2024-05-31 PROCEDURE — 97535 SELF CARE MNGMENT TRAINING: CPT

## 2024-05-31 RX ORDER — TERAZOSIN 1 MG/1
1 CAPSULE ORAL NIGHTLY
Status: DISCONTINUED | OUTPATIENT
Start: 2024-05-31 | End: 2024-06-25 | Stop reason: HOSPADM

## 2024-05-31 RX ADMIN — OLANZAPINE 10 MG: 10 INJECTION, POWDER, LYOPHILIZED, FOR SOLUTION INTRAMUSCULAR at 00:47

## 2024-05-31 RX ADMIN — DULOXETINE HYDROCHLORIDE 30 MG: 30 CAPSULE, DELAYED RELEASE ORAL at 20:16

## 2024-05-31 RX ADMIN — LATANOPROST 1 DROP: 50 SOLUTION OPHTHALMIC at 20:20

## 2024-05-31 RX ADMIN — CEFTRIAXONE 2000 MG: 2 INJECTION, POWDER, FOR SOLUTION INTRAMUSCULAR; INTRAVENOUS at 10:52

## 2024-05-31 RX ADMIN — SENNOSIDES AND DOCUSATE SODIUM 2 TABLET: 50; 8.6 TABLET ORAL at 20:16

## 2024-05-31 RX ADMIN — BRIMONIDINE TARTRATE 1 DROP: 2 SOLUTION OPHTHALMIC at 09:52

## 2024-05-31 RX ADMIN — INSULIN GLARGINE 24 UNITS: 100 INJECTION, SOLUTION SUBCUTANEOUS at 09:52

## 2024-05-31 RX ADMIN — ONDANSETRON 4 MG: 2 INJECTION INTRAMUSCULAR; INTRAVENOUS at 00:47

## 2024-05-31 RX ADMIN — BRIMONIDINE TARTRATE 1 DROP: 2 SOLUTION OPHTHALMIC at 20:16

## 2024-05-31 RX ADMIN — ENOXAPARIN SODIUM 40 MG: 100 INJECTION SUBCUTANEOUS at 09:51

## 2024-05-31 RX ADMIN — Medication 10 ML: at 09:52

## 2024-05-31 RX ADMIN — Medication 10 ML: at 20:16

## 2024-05-31 RX ADMIN — OLANZAPINE 5 MG: 5 TABLET, FILM COATED ORAL at 20:16

## 2024-05-31 RX ADMIN — ACETAMINOPHEN 325MG 650 MG: 325 TABLET ORAL at 20:28

## 2024-05-31 RX ADMIN — ACETAMINOPHEN 325MG 650 MG: 325 TABLET ORAL at 05:02

## 2024-05-31 RX ADMIN — TERAZOSIN HYDROCHLORIDE 1 MG: 1 CAPSULE ORAL at 20:16

## 2024-05-31 RX ADMIN — ASPIRIN 81 MG: 81 TABLET, COATED ORAL at 20:16

## 2024-05-31 NOTE — PLAN OF CARE
Problem: Adult Inpatient Plan of Care  Goal: Plan of Care Review  Flowsheets (Taken 5/31/2024 1617)  Outcome Evaluation: AOx3. 3.5 L nasal cannula. Assist x2 to chair. Patient had small incontinence episode this morning but has not urinated since, unable to use urinal or purewick despite prompting. Straight cathed at 4 pm to get urine sample, bladder scan 275 and 300 cc removed. Changes positions independently.Currently down for renal ultrasound, Echo is pending.   Goal Outcome Evaluation:              Outcome Evaluation: AOx3. 3.5 L nasal cannula. Assist x2 to chair. Patient had small incontinence episode this morning but has not urinated since, unable to use urinal or purewick despite prompting. Straight cathed at 4 pm to get urine sample, bladder scan 275 and 300 cc removed. Changes positions independently.Currently down for renal ultrasound, Echo is pending.

## 2024-05-31 NOTE — THERAPY EVALUATION
Patient Name: Dilip Verma  : 1950    MRN: [id number]                              Today's Date: 2024       Admit Date: 2024    Visit Dx:     ICD-10-CM ICD-9-CM   1. Acute respiratory failure with hypoxia  J96.01 518.81   2. Sepsis without acute organ dysfunction, due to unspecified organism  A41.9 038.9     995.91   3. Hyperkalemia  E87.5 276.7   4. Metabolic encephalopathy  G93.41 348.31   5. Acute UTI  N39.0 599.0   6. Hyperglycemia due to diabetes mellitus  E11.65 250.02     Patient Active Problem List   Diagnosis    Anxiety    Colon polyp    Type 2 diabetes mellitus with hyperglycemia, with long-term current use of insulin    Erectile dysfunction    Hyperlipidemia    Type 2 acute myocardial infarction    CAD (coronary artery disease)    Hx of CABG    Chronic diastolic heart failure    Hypersomnia due to medical condition    CSA (central sleep apnea)    Periodic breathing    HTN (hypertension)    History of stroke    CKD (chronic kidney disease) stage 3, GFR 30-59 ml/min    Urinary retention    Acute on chronic renal failure    Acute UTI (urinary tract infection)    Acute on chronic diastolic (congestive) heart failure    Bacteriuria    Rectal pain    Status post total replacement of hip    Vitamin D deficiency    Post-acute COVID-19 syndrome    Insulin dose changed    Arthropathy associated with neurological disorder    Personal history of colonic polyps    Type 2 diabetes mellitus with diabetic neuropathy, with long-term current use of insulin    Cervical spinal stenosis    Abscess of skin    Overweight    Cervical stenosis of spine    Spinal stenosis, lumbar region, without neurogenic claudication    Medically noncompliant    Chronic heart failure with preserved ejection fraction (HFpEF)    Carotid stenosis    Peripheral arterial disease    Sepsis    Hyperkalemia    Metabolic encephalopathy    AMENA (acute kidney injury)     Past Medical History:   Diagnosis Date    AMENA (acute kidney  injury) 12/03/2020    Alcoholism 1989    not since 1998    CORI positive     Anemia     Anxiety     Ataxia     Atypical chest pain 04/19/2022    SEEN AT [facility] ER    Balance disorder     Carotid stenosis 3/28/2024    Cataract     BILATERAL, S/P EXTRACTION    Cervical radiculopathy 11/11/2019    SEEN AT  [facility] ER    Cervical spinal cord compression     Chronic diastolic (congestive) heart failure     Chronic kidney disease     STAGE 3, FOLLOWED BY DR. VIVAR    Chronic pancreatitis     Closed left subtrochanteric femur fracture 12/01/2020    ADMITTED TO [facility]    Closed nondisplaced intertrochanteric fracture of left femur 12/01/2020    ADMITTED TO [facility]    Colon polyps     FOLLOWED BY DR. AVTAR JEONG    Constipation     Contracture, right hand     Coronary artery disease     CABG 7/2019    COVID-19 07/2022    DDD (degenerative disc disease), cervical     Diabetes mellitus, type II     IDDM    Diabetic retinopathy     Difficulty walking     Dysphagia     Elevated brain natriuretic peptide (BNP) level 08/2014    Elevated LFTs 03/2021    Erectile dysfunction     Fissure, anal 2022    Foot drop     Fuchs' corneal dystrophy of right eye     Glaucoma     BILATERAL    History of alcohol abuse     Hyperlipidemia     Hypersomnia     Hypertension     Hypertensive urgency 02/25/2020    ADMITTED TO [facility]    Insomnia     Kidney stones     LV dysfunction 06/2016    Lyme disease     Lymphadenopathy syndrome 05/2021    Macular edema     BILATERAL    Myocardial infarction 11/05/2019    NSTEMI, ADMITTED TO [facility]    Myocardial infarction 07/12/2019    NSTEMI, ADMITTED TO [facility]    Neuropathy in diabetes     Non-celiac gluten sensitivity     Orthostasis     Osteoporosis     Oxygen dependent     PAD (peripheral artery disease)     PNA (pneumonia) 06/2016    LEFT LOBE    Polyneuropathy     PTSD (post-traumatic stress disorder)     Pulmonary hypertension     Pulmonary nodule     Senile ectropion of both lower eyelids 02/2022    Sepsis 12/16/2020    D/T  "UTI, ADMITTED TO [facility]    Sleep apnea     STATES DOESN'T USE BIPAP OR CPAP    Spinal stenosis in cervical region     SEVERE-LIMITED ROM    Stroke 2012    \"slight stroke\"    Syncope and collapse 07/06/2019    ADMITTED TO [facility]    Urinary retention 04/05/2021    SEEN AT [facility] ER    Vision loss     Vitamin D deficiency      Past Surgical History:   Procedure Laterality Date    CARDIAC CATHETERIZATION N/A 07/15/2019    Procedure: Coronary angiography;  Surgeon: Carrie Price MD;  Location:  [facility];  Service: Cardiovascular    CARDIAC CATHETERIZATION N/A 07/15/2019    Procedure: Left Heart Cath;  Surgeon: Carrie Price MD;  Location:  [facility];  Service: Cardiovascular    CARDIAC CATHETERIZATION N/A 07/15/2019    Procedure: Left ventriculography;  Surgeon: Carrie Price MD;  Location:  [facility];  Service: Cardiovascular    CARDIAC CATHETERIZATION  07/15/2019    Procedure: Functional Flow [place];  Surgeon: Carrie Price MD;  Location:  [facility];  Service: Cardiovascular    CARDIAC CATHETERIZATION N/A 11/06/2019    Procedure: Right and Left Heart Cath;  Surgeon: Mya Smith MD;  Location:  [facility];  Service: Cardiovascular    CARDIAC CATHETERIZATION N/A 11/06/2019    Procedure: Coronary angiography;  Surgeon: Mya Smith MD;  Location:  [facility];  Service: Cardiovascular    CARDIAC SURGERY      CATARACT EXTRACTION Left 2014    CATARACT EXTRACTION Right 11/2016    PHACO/IOL, DR. LEN DENTON    COLONOSCOPY N/A 03/16/2023    ENTIRE COLON WNL, RESCOPE IN 5 YRS, DR. LINDA LIZARRAGA AT [facility]    COLONOSCOPY W/ POLYPECTOMY N/A 01/02/2015    A FEW DIVERTICULA IN SIGMOID, 6 MM TUBULOVILLOUS ADENOMA POLYP IN RECTUM, SMALL HEMORRHOIDS, MELANOSIS COLI, DR. AVTAR JEONG AT [place] [facility]    CORONARY ARTERY BYPASS GRAFT N/A 07/18/2019    Procedure: INTRAOPERATIVE SHOAIB; STERNOTOMY CORONARY ARTERY " BYPASS x 3  USING LEFT INTERNAL MAMMARY ARTERY GRAFT UTILIZING ENDOSCOPICALLY HARVESTED RIGHT GREATER SAPHENOUS VEIN AND PRP.;  Surgeon: Bill Devi MD;  Location: [facility] MAIN OR;  Service: Cardiothoracic    CYSTOSCOPY BLADDER STONE LITHOTRIPSY N/A     DENTAL PROCEDURE Bilateral     3 surgeries inder implants    FEMUR IM NAILING/RODDING Left 12/03/2020    Procedure: LEFT HIP INTRAMEDULLARY NAIL;  Surgeon: Niraj Ravi MD;  Location: [facility] MAIN OR;  Service: Orthopedic Spine;  Laterality: Left;    INCISION AND DRAINAGE PERIRECTAL ABSCESS N/A 10/04/2023    Procedure: Incision and drainage of perianal and buttock abscess;  Surgeon: Herbie Villatoro MD;  Location: [facility] MAIN OR;  Service: General;  Laterality: N/A;    INGUINAL HERNIA REPAIR Bilateral     TOENAIL EXCISION  06/2022    TONSILLECTOMY Bilateral     TOTAL HIP ARTHROPLASTY REVISION Left 01/11/2022    Procedure: TOTAL HIP ARTHROPLASTY REVISION- POSTERIOR;  Surgeon: Jurgen Candelaria II, MD;  Location: [facility] OR;  Service: Orthopedics;  Laterality: Left;    VASECTOMY N/A       General Information       Row Name 05/31/24 1014          OT Time and Intention    Document Type evaluation (P)   -     Mode of Treatment individual therapy;occupational therapy (P)   -       Row Name 05/31/24 1014          General Information    Patient Profile Reviewed yes (P)   -     Existing Precautions/Restrictions fall;oxygen therapy device and L/min (P)   -     Barriers to Rehab medically complex;cognitive status;previous functional deficit (P)   -       Row Name 05/31/24 1014          Living Environment    People in Home spouse (P)   -       Row Name 05/31/24 1014          Cognition    Orientation Status (Cognition) oriented to;person;place (P)   -       Row Name 05/31/24 1014          Safety Issues, Functional Mobility    Safety Issues Affecting Function (Mobility) at risk behavior observed;awareness of need for assistance;insight into  deficits/self-awareness;judgment;problem-solving;safety precaution awareness;safety precautions follow-through/compliance;sequencing abilities (P)   -MF     Impairments Affecting Function (Mobility) balance;cognition;coordination;endurance/activity tolerance;grasp;range of motion (ROM);shortness of breath;strength (P)   -MF     Cognitive Impairments, Mobility Safety/Performance attention;awareness, need for assistance;insight into deficits/self-awareness;judgment;problem-solving/reasoning;safety precaution awareness;safety precaution follow-through;sequencing abilities (P)   -MF     Comment, Safety Issues/Impairments (Mobility) cognitive status main safety limitation (P)   -MF               User Key  (r) = Recorded By, (t) = Taken By, (c) = Cosigned By      Initials Name Provider Type    Annita Salinas OT Student OT Student                     Mobility/ADL's       Row Name 05/31/24 1017          Bed Mobility    Bed Mobility bed mobility (all) activities (P)   -     All Activities, [place] (Bed Mobility) maximum assist (25% patient effort);1 person assist (P)   -     Bed Mobility, Safety Issues cognitive deficits limit understanding;decreased use of arms for pushing/pulling;decreased use of legs for bridging/pushing (P)   -     Assistive Device (Bed Mobility) bed rails;head of bed elevated (P)   -       Row Name 05/31/24 1017          Transfers    Transfers sit-stand transfer;stand-sit transfer (P)   -       Row Name 05/31/24 1017          Sit-Stand Transfer    Sit-Stand [place] (Transfers) 2 person assist;minimum assist (75% patient effort) (P)   -     Assistive Device (Sit-Stand Transfers) walker, front-wheeled (P)   -       Row Name 05/31/24 1017          Stand-Sit Transfer    Stand-Sit [place] (Transfers) moderate assist (50% patient effort);2 person assist (P)   -     Assistive Device (Stand-Sit Transfers) walker, front-wheeled (P)   -       Row Name 05/31/24 1017           Functional Mobility    Functional Mobility- Ind. Level minimum assist (75% patient effort);2 person assist required (P)   -     Functional Mobility- Device walker, front-wheeled (P)   -     Functional Mobility- Safety Issues sequencing ability decreased;step length decreased;weight-shifting ability decreased;supplemental O2 (P)   -     Functional Mobility- Comment pt took side steps at EOB (P)   -     Patient was able to Ambulate yes (P)   -MF       Row Name 05/31/24 1017          Activities of Daily Living    BADL Assessment/Intervention feeding;lower body dressing (P)   -MF       Row Name 05/31/24 1017          Self-Feeding Assessment/Training    [place] Level (Feeding) set up;feeding skills;verbal cues;minimum assist (75% patient effort) (P)   -     Position (Self-Feeding) edge of bed sitting (P)   -     Comment, (Feeding) hand over hand and verbal cues for utensil  (P)   -MF       Row Name 05/31/24 1017          Lower Body Dressing Assessment/Training    [place] Level (Lower Body Dressing) lower body dressing skills;don;socks;dependent (less than 25% patient effort) (P)   -     Position (Lower Body Dressing) supine (P)   -               User Key  (r) = Recorded By, (t) = Taken By, (c) = Cosigned By      Initials Name Provider Type    Annita Salinas OT Student OT Student                   Obj/Interventions       Row Name 05/31/24 1020          Sensory Assessment (Somatosensory)    Sensory Assessment (Somatosensory) sensation intact (P)   -MF       Row Name 05/31/24 1020          Vision Assessment/Intervention    Visual Impairment/Limitations WFL (P)   -MF       Row Name 05/31/24 1020          Range of Motion Comprehensive    General Range of Motion upper extremity range of motion deficits identified (P)   -     Comment, General Range of Motion R LE ~50% ROM d/t previous stroke (P)   -MF       Row Name 05/31/24 1020          Strength Comprehensive (MMT)    General  Manual Muscle Testing (MMT) Assessment upper extremity strength deficits identified (P)   -MF     Comment, General Manual Muscle Testing (MMT) Assessment nonfunctional use of RLE grossly 3-/5 MMT (P)   -       Row Name 05/31/24 1020          Upper Extremity (Manual Muscle Testing)    Upper Extremity: Manual Muscle Testing (MMT) left UE strength is WFL (P)   -       Row Name 05/31/24 1020          Motor Skills    Motor Skills coordination;functional endurance (P)   -MF     Coordination WFL (P)   -     Functional Endurance poor d/t weakness (P)   -       Row Name 05/31/24 1020          Balance    Balance Assessment sitting static balance;sitting dynamic balance;sit to stand dynamic balance;standing static balance;standing dynamic balance (P)   -     Static Sitting Balance standby assist;1-person assist (P)   -MF     Dynamic Sitting Balance standby assist;1-person assist (P)   -MF     Position, Sitting Balance unsupported;sitting edge of bed (P)   -MF     Sit to Stand Dynamic Balance minimal assist;2-person assist (P)   -MF     Static Standing Balance minimal assist;2-person assist (P)   -MF     Dynamic Standing Balance minimal assist;2-person assist (P)   -MF     Position/Device Used, Standing Balance walker, front-wheeled;supported (P)   -MF     Balance Interventions sitting;standing;sit to stand;supported;static;dynamic;moderate challenge (P)   -MF     Comment, Balance balance is fair with rwx (P)   -MF               User Key  (r) = Recorded By, (t) = Taken By, (c) = Cosigned By      Initials Name Provider Type     Annita Tabares OT Student OT Student                   Goals/Plan       Row Name 05/31/24 1054          Bed Mobility Goal 1 (OT)    Activity/Assistive Device (Bed Mobility Goal 1, OT) bed mobility activities, all (P)   -MF     [place] Level/Cues Needed (Bed Mobility Goal 1, OT) minimum assist (75% or more patient effort) (P)   -MF     Time Frame (Bed Mobility Goal 1, OT) short term  goal (STG);2 weeks (P)   -MF     Progress/Outcomes (Bed Mobility Goal 1, OT) goal ongoing (P)   -MF       Row Name 05/31/24 1054          Transfer Goal 1 (OT)    Activity/Assistive Device (Transfer Goal 1, OT) transfers, all (P)   -MF     [place] Level/Cues Needed (Transfer Goal 1, OT) contact guard required (P)   -MF     Time Frame (Transfer Goal 1, OT) 2 weeks;short term goal (STG) (P)   -MF     Progress/Outcome (Transfer Goal 1, OT) goal ongoing (P)   -MF       Row Name 05/31/24 1054          Bathing Goal 1 (OT)    Activity/Device (Bathing Goal 1, OT) bathing skills, all (P)   -MF     [place] Level/Cues Needed (Bathing Goal 1, OT) minimum assist (75% or more patient effort) (P)   -MF     Time Frame (Bathing Goal 1, OT) short term goal (STG);2 weeks (P)   -MF     Progress/Outcomes (Bathing Goal 1, OT) goal ongoing (P)   -       Row Name 05/31/24 1054          Dressing Goal 1 (OT)    Activity/Device (Dressing Goal 1, OT) dressing skills, all (P)   -MF     [place]/Cues Needed (Dressing Goal 1, OT) minimum assist (75% or more patient effort) (P)   -MF     Time Frame (Dressing Goal 1, OT) short term goal (STG);2 weeks (P)   -MF     Progress/Outcome (Dressing Goal 1, OT) goal ongoing (P)   -       Row Name 05/31/24 1054          Toileting Goal 1 (OT)    Activity/Device (Toileting Goal 1, OT) toileting skills, all (P)   -MF     [place] Level/Cues Needed (Toileting Goal 1, OT) minimum assist (75% or more patient effort) (P)   -MF     Time Frame (Toileting Goal 1, OT) short term goal (STG);2 weeks (P)   -               User Key  (r) = Recorded By, (t) = Taken By, (c) = Cosigned By      Initials Name Provider Type    Annita Salinas OT Student OT Student                   Clinical Impression       Row Name 05/31/24 1043          Pain Assessment    Pretreatment Pain Rating 0/10 - no pain (P)   -MF     Posttreatment Pain Rating 0/10 - no pain (P)   -       Row Name 05/31/24 1043           Plan of Care Review    Plan of Care Reviewed With patient;spouse (P)   -     Progress no change (P)   -     Outcome Evaluation Pt is a 74 year old M admitted for altered mental status with hx of stroke. Per chart, pt dx is acute UTI. Pt lives at home with wife as his primary caretaker. Pt is on supplemental O2 at baseline and requires assistance for ADL's. Pt has nonfunctional use and weakness in RUE from previous stroke several years ago. Pt wife reports hx of falls in the home but feels she is able to care for him adequately. Pt sleeping in bed upon arrival with noted edema in LLE. Pt demo'd max x1 bed mobility and sat EOB to complete self feeding task with min A. D/t pt's non use of R hand, pt primarily uses L hand for manipulation. Pt intially had difficulty gripping spoon but was able to self correct with verbal cues. Hand over hand support was given for feeding motion d/t cognitive status. Pt stood with min A x2 and rwx and was able to take small side steps at EOB. O2 sat remained in 90's throughout session. Pt pivoted from bed to chair and remained UIC upon exit. Pt balance is fair using rwx however sequencing cues were needed during functional mobility. Pt presents with generalized weakness, impaired activity tolerance and safety awareness d/t deficits in cognition. OT services would benefit pt to address noted deficits. Rec d/c home with 24/7 assist (P)   -       Row Name 05/31/24 4389          Therapy Assessment/Plan (OT)    Rehab Potential (OT) good, to achieve stated therapy goals (P)   -     Criteria for Skilled Therapeutic Interventions Met (OT) yes;skilled treatment is necessary (P)   -     Therapy Frequency (OT) 5 times/wk (P)   -       Row Name 05/31/24 1044          Therapy Plan Review/Discharge Plan (OT)    Anticipated Discharge Disposition (OT) home with 24/7 care (P)   -       Row Name 05/31/24 1043          Vital Signs    O2 Delivery Pre Treatment supplemental O2 (P)   -     O2  Delivery Intra Treatment supplemental O2 (P)   -     O2 Delivery Post Treatment supplemental O2 (P)   -       Row Name 05/31/24 1043          Positioning and Restraints    Pre-Treatment Position in bed (P)   -MF     Post Treatment Position chair (P)   -MF     In Chair notified nsg;reclined;call light within reach;exit alarm on;with family/caregiver (P)   -               User Key  (r) = Recorded By, (t) = Taken By, (c) = Cosigned By      Initials Name Provider Type    Annita Salinas OT Student OT Student                   Outcome Measures       Row Name 05/31/24 1056          How much help from another is currently needed...    Putting on and taking off regular lower body clothing? 1 (P)   -MF     Bathing (including washing, rinsing, and drying) 2 (P)   -MF     Toileting (which includes using toilet bed pan or urinal) 2 (P)   -MF     Putting on and taking off regular upper body clothing 2 (P)   -MF     Taking care of personal grooming (such as brushing teeth) 3 (P)   -MF     Eating meals 3 (P)   -     AM-PAC 6 Clicks Score (OT) 13 (P)   -       Row Name 05/31/24 1056          Modified [place] Scale    Pre-Stroke Modified Jeremias Scale 4 - Moderately severe disability.  Unable to walk without assistance, and unable to attend to own bodily needs without assistance. (P)   -       Row Name 05/31/24 1056          Functional Assessment    Outcome Measure Options AM-PAC 6 Clicks Daily Activity (OT);Modified Jeremias (P)   -               User Key  (r) = Recorded By, (t) = Taken By, (c) = Cosigned By      Initials Name Provider Type    Annita Salinas OT Student OT Student                    Occupational Therapy Education       Title: PT OT SLP Therapies (Done)       Topic: Occupational Therapy (Done)       Point: ADL training (Done)       Description:   Instruct learner(s) on proper safety adaptation and remediation techniques during self care or transfers.   Instruct in proper use of assistive  devices.                  Learning Progress Summary             Patient Acceptance, E, VU by  at 5/31/2024 1056   Family Acceptance, E, VU by  at 5/31/2024 1056                         Point: Home exercise program (Done)       Description:   Instruct learner(s) on appropriate technique for monitoring, assisting and/or progressing therapeutic exercises/activities.                  Learning Progress Summary             Patient Acceptance, E, VU by  at 5/31/2024 1056   Family Acceptance, E, VU by  at 5/31/2024 1056                         Point: Precautions (Done)       Description:   Instruct learner(s) on prescribed precautions during self-care and functional transfers.                  Learning Progress Summary             Patient Acceptance, E, VU by  at 5/31/2024 1056   Family Acceptance, E, VU by  at 5/31/2024 1056                         Point: Body mechanics (Done)       Description:   Instruct learner(s) on proper positioning and spine alignment during self-care, functional mobility activities and/or exercises.                  Learning Progress Summary             Patient Acceptance, E, VU by  at 5/31/2024 1056   Family Acceptance, E, VU by  at 5/31/2024 1056                                         User Key       Initials Effective Dates Name Provider Type Discipline     04/30/24 -  Annita Tabares OT Student OT Student OT                  OT Recommendation and Plan  Therapy Frequency (OT): (P) 5 times/wk  Plan of Care Review  Plan of Care Reviewed With: (P) patient, spouse  Progress: (P) no change  Outcome Evaluation: (P) Pt is a 74 year old M admitted for altered mental status with hx of stroke. Per chart, pt dx is acute UTI. Pt lives at home with wife as his primary caretaker. Pt is on supplemental O2 at baseline and requires assistance for ADL's. Pt has nonfunctional use and weakness in RUE from previous stroke several years ago. Pt wife reports hx of falls in the home but feels  she is able to care for him adequately. Pt sleeping in bed upon arrival with noted edema in LLE. Pt demo'd max x1 bed mobility and sat EOB to complete self feeding task with min A. D/t pt's non use of R hand, pt primarily uses L hand for manipulation. Pt intially had difficulty gripping spoon but was able to self correct with verbal cues. Hand over hand support was given for feeding motion d/t cognitive status. Pt stood with min A x2 and rwx and was able to take small side steps at EOB. O2 sat remained in 90's throughout session. Pt pivoted from bed to chair and remained UIC upon exit. Pt balance is fair using rwx however sequencing cues were needed during functional mobility. Pt presents with generalized weakness, impaired activity tolerance and safety awareness d/t deficits in cognition. OT services would benefit pt to address noted deficits. Rec d/c home with 24/7 assist     Time Calculation:   Evaluation Complexity (OT)  Review Occupational Profile/Medical/Therapy History Complexity: (P) expanded/moderate complexity  Assessment, Occupational Performance/Identification of Deficit Complexity: (P) 3-5 performance deficits  Clinical Decision Making Complexity (OT): (P) detailed assessment/moderate complexity  Overall Complexity of Evaluation (OT): (P) moderate complexity     Time Calculation- OT       Row Name 05/31/24 1057             Time Calculation-     OT Start Time 0845 (P)   -      OT Stop Time 0915 (P)   -      OT Time Calculation (min) 30 min (P)   -      Total Timed Code Minutes- OT 23 minute(s) (P)   -      OT Received On 05/31/24 (P)   -      OT - Next Appointment 06/03/24 (P)   -      OT Goal Re-Cert Due Date 06/14/24 (P)   -         Timed Charges    90347 - OT Therapeutic Activity Minutes 8 (P)   -      13930 - OT Self Care/Mgmt Minutes 15 (P)   -         Untimed Charges    OT Eval/Re-eval Minutes 7 (P)   -         Total Minutes    Timed Charges Total Minutes 23 (P)   -       Untimed Charges Total Minutes 7 (P)   -       Total Minutes 30 (P)   -                User Key  (r) = Recorded By, (t) = Taken By, (c) = Cosigned By      Initials Name Provider Type    Annita Salinas OT Student OT Mark                  Therapy Charges for Today       Code Description Service Date Service Provider Modifiers Qty    [id number] HC OT THERAPEUTIC ACT EA 15 MIN 5/31/2024 Annita Tabares OT Student GO 1    [id number]  OT SELF CARE/MGMT/TRAIN EA 15 MIN 5/31/2024 Annita Tabares OT Student GO 1    [id number]  OT EVAL MOD COMPLEXITY 2 5/31/2024 Annita Tabares OT Student GO 1                 SANDY Cadet  5/31/2024

## 2024-05-31 NOTE — PLAN OF CARE
Goal Outcome Evaluation:  Plan of Care Reviewed With: patient, spouse           Outcome Evaluation: Pt is a 73 yo male adm with AMS, found to have a UTI, sepsis, AMS, acute resp failure with hypoxia, pt and wife report that pt is on o2 at night only at home. 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 Wife reports that pt can walk with a rollator around the house with stand by assist at baseline. Pt was lethargic today, was sleeping on arrival but was easily aroused. Pt presented this afternoon with significant deficits with B LE weakness R > L, B foot drop, limited use of R UE, balance deificts and signficant gait deficits, requiring mod a x 2 to walk 3 ft and a chair had to be brought up. Pt will benefit from PT. Pt spouse reports strong desire to take pt home at discharge, PT will cont to follow and depending on progress, pt may need SNF at discharge      Anticipated Discharge Disposition (PT): home with assist, skilled nursing facility

## 2024-05-31 NOTE — NURSING NOTE
Patient restless, confused and barely slept. IVF continued and PRN zyprexa given. Wife still at bedside. Bed alarm on and will continue monitoring.

## 2024-05-31 NOTE — PLAN OF CARE
Problem: Adult Inpatient Plan of Care  Goal: Plan of Care Review  Outcome: Ongoing, Progressing  Flowsheets  Taken 5/31/2024 0612 by Ginna Cedeno RN  Progress: no change  Taken 5/30/2024 1635 by Jaime Morin RN Extern  Plan of Care Reviewed With:   patient   spouse  Goal: Patient-Specific Goal (Individualized)  Outcome: Ongoing, Progressing  Goal: Absence of Hospital-Acquired Illness or Injury  Outcome: Ongoing, Progressing  Intervention: Identify and Manage Fall Risk  Recent Flowsheet Documentation  Taken 5/31/2024 0600 by Ginna Cedeno RN  Safety Promotion/Fall Prevention: safety round/check completed  Taken 5/31/2024 0400 by Ginna Cedeno RN  Safety Promotion/Fall Prevention: safety round/check completed  Taken 5/31/2024 0200 by Ginna Cedeno RN  Safety Promotion/Fall Prevention: safety round/check completed  Taken 5/31/2024 0000 by Ginna Cedeno RN  Safety Promotion/Fall Prevention: safety round/check completed  Taken 5/30/2024 2200 by Ginna Cedeno RN  Safety Promotion/Fall Prevention: safety round/check completed  Taken 5/30/2024 2030 by Ginna Cedeno RN  Safety Promotion/Fall Prevention:   safety round/check completed   nonskid shoes/slippers when out of bed   lighting adjusted   fall prevention program maintained   clutter free environment maintained   activity supervised  Taken 5/30/2024 2000 by Ginna Cedeno RN  Safety Promotion/Fall Prevention:   safety round/check completed   nonskid shoes/slippers when out of bed   lighting adjusted   fall prevention program maintained   clutter free environment maintained   activity supervised  Intervention: Prevent Skin Injury  Recent Flowsheet Documentation  Taken 5/31/2024 0600 by Ginna Cedeno RN  Body Position: weight shifting  Taken 5/31/2024 0400 by Ginna Cedeno RN  Body Position: weight shifting  Taken 5/31/2024 0200 by Ginna Cedeno RN  Body Position: weight shifting  Taken 5/31/2024 0000 by Ginna Cedeno  RN  Body Position: weight shifting  Taken 5/30/2024 2200 by Ginna Cedeno RN  Body Position: weight shifting  Taken 5/30/2024 2030 by Ginna Cedeno RN  Body Position: sitting up in bed  Skin Protection:   adhesive use limited   transparent dressing maintained   tubing/devices free from skin contact  Taken 5/30/2024 2000 by Ginna Cedeno RN  Body Position: weight shifting  Intervention: Prevent and Manage VTE (Venous Thromboembolism) Risk  Recent Flowsheet Documentation  Taken 5/31/2024 0600 by Ginna Cedeno RN  Activity Management: activity encouraged  Taken 5/31/2024 0400 by Ginna Cedeno RN  Activity Management: activity encouraged  Taken 5/31/2024 0200 by Ginna Cedeno RN  Activity Management: activity encouraged  Taken 5/31/2024 0000 by Ginna Cedeno RN  Activity Management: activity encouraged  Taken 5/30/2024 2200 by Ginna Cedeno RN  Activity Management: activity encouraged  Taken 5/30/2024 2030 by Ginna Cedeno RN  Activity Management: activity encouraged  Taken 5/30/2024 2000 by Ginna Cedeno RN  Activity Management: activity encouraged  Intervention: Prevent Infection  Recent Flowsheet Documentation  Taken 5/31/2024 0600 by Ginna Cedeno RN  Infection Prevention: rest/sleep promoted  Taken 5/31/2024 0400 by Ginna Cedeno RN  Infection Prevention: rest/sleep promoted  Taken 5/31/2024 0200 by Ginna Cedeno RN  Infection Prevention: rest/sleep promoted  Taken 5/31/2024 0000 by Ginna Cedeno RN  Infection Prevention:   visitors restricted/screened   single patient room provided   rest/sleep promoted   personal protective equipment utilized   hand hygiene promoted  Taken 5/30/2024 2200 by Ginna Cedeno RN  Infection Prevention:   visitors restricted/screened   single patient room provided   rest/sleep promoted   personal protective equipment utilized   hand hygiene promoted  Taken 5/30/2024 2030 by Ginna Cedeno RN  Infection Prevention:   visitors  restricted/screened   single patient room provided   rest/sleep promoted   personal protective equipment utilized   hand hygiene promoted  Taken 5/30/2024 2000 by Ginna Cedeno, RN  Infection Prevention:   visitors restricted/screened   single patient room provided   rest/sleep promoted   personal protective equipment utilized   hand hygiene promoted  Goal: Optimal Comfort and Wellbeing  Outcome: Ongoing, Progressing  Intervention: Monitor Pain and Promote Comfort  Recent Flowsheet Documentation  Taken 5/30/2024 2030 by Ginna Cedeno, RN  Pain Management Interventions:   see MAR   quiet environment facilitated   position adjusted   pillow support provided   care clustered  Intervention: Provide Person-Centered Care  Recent Flowsheet Documentation  Taken 5/30/2024 2030 by Ginna Cedeno RN  Trust Relationship/Rapport:   care explained   choices provided   emotional support provided   empathic listening provided   questions answered  Goal: Readiness for Transition of Care  Outcome: Ongoing, Progressing     Problem: Diabetes Comorbidity  Goal: Blood Glucose Level Within Targeted Range  Outcome: Ongoing, Progressing  Intervention: Monitor and Manage Glycemia  Recent Flowsheet Documentation  Taken 5/30/2024 2030 by Ginna Cedeno RN  Glycemic Management: blood glucose monitored     Problem: Heart Failure Comorbidity  Goal: Maintenance of Heart Failure Symptom Control  Outcome: Ongoing, Progressing     Problem: Hypertension Comorbidity  Goal: Blood Pressure in Desired Range  Outcome: Ongoing, Progressing     Problem: Obstructive Sleep Apnea Risk or Actual Comorbidity Management  Goal: Unobstructed Breathing During Sleep  Outcome: Ongoing, Progressing     Problem: Skin Injury Risk Increased  Goal: Skin Health and Integrity  Outcome: Ongoing, Progressing  Intervention: Optimize Skin Protection  Recent Flowsheet Documentation  Taken 5/31/2024 0600 by Ginna Cedeno, RN  Head of Bed (HOB) Positioning: HOB  elevated  Taken 5/31/2024 0400 by Ginna Cedeno RN  Head of Bed (HOB) Positioning: HOB elevated  Taken 5/31/2024 0200 by Ginna Cedeno RN  Head of Bed (HOB) Positioning: HOB elevated  Taken 5/31/2024 0000 by Ginna Cedeno RN  Head of Bed (HOB) Positioning: HOB elevated  Taken 5/30/2024 2200 by Ginna Cedeon RN  Head of Bed (HOB) Positioning: HOB elevated  Taken 5/30/2024 2030 by Ginna Cedeno RN  Pressure Reduction Techniques:   frequent weight shift encouraged   weight shift assistance provided  Head of Bed (HOB) Positioning: HOB elevated  Pressure Reduction Devices: alternating pressure pump (ADD)  Skin Protection:   adhesive use limited   transparent dressing maintained   tubing/devices free from skin contact  Taken 5/30/2024 2000 by Ginna Cedeno RN  Head of Bed (\[facility] [place] [facility]\"") Positioning: HOB elevated     Problem: Fall Injury Risk  Goal: Absence of Fall and Fall-Related Injury  Outcome: Ongoing, Progressing  Intervention: Promote Injury-Free Environment  Recent Flowsheet Documentation  Taken 5/31/2024 0600 by Ginna Cedeno RN  Safety Promotion/Fall Prevention: safety round/check completed  Taken 5/31/2024 0400 by Ginna Cedeno RN  Safety Promotion/Fall Prevention: safety round/check completed  Taken 5/31/2024 0200 by Ginna Cedeno RN  Safety Promotion/Fall Prevention: safety round/check completed  Taken 5/31/2024 0000 by Ginna Cedeno RN  Safety Promotion/Fall Prevention: safety round/check completed  Taken 5/30/2024 2200 by Ginna Cedeno RN  Safety Promotion/Fall Prevention: safety round/check completed  Taken 5/30/2024 2030 by Ginna Cedeno RN  Safety Promotion/Fall Prevention:   safety round/check completed   nonskid shoes/slippers when out of bed   lighting adjusted   fall prevention program maintained   clutter free environment maintained   activity supervised  Taken 5/30/2024 2000 by Ginna Cedeno RN  Safety Promotion/Fall Prevention:   safety round/check  completed   nonskid shoes/slippers when out of bed   lighting adjusted   fall prevention program maintained   clutter free environment maintained   activity supervised   Goal Outcome Evaluation:           Progress: no change

## 2024-05-31 NOTE — THERAPY EVALUATION
Patient Name: Dilip Verma  : 1950    MRN: [id number]                              Today's Date: 2024       Admit Date: 2024    Visit Dx:     ICD-10-CM ICD-9-CM   1. Acute respiratory failure with hypoxia  J96.01 518.81   2. Sepsis without acute organ dysfunction, due to unspecified organism  A41.9 038.9     995.91   3. Hyperkalemia  E87.5 276.7   4. Metabolic encephalopathy  G93.41 348.31   5. Acute UTI  N39.0 599.0   6. Hyperglycemia due to diabetes mellitus  E11.65 250.02     Patient Active Problem List   Diagnosis    Anxiety    Colon polyp    Type 2 diabetes mellitus with hyperglycemia, with long-term current use of insulin    Erectile dysfunction    Hyperlipidemia    Type 2 acute myocardial infarction    CAD (coronary artery disease)    Hx of CABG    Chronic diastolic heart failure    Hypersomnia due to medical condition    CSA (central sleep apnea)    Periodic breathing    HTN (hypertension)    History of stroke    CKD (chronic kidney disease) stage 3, GFR 30-59 ml/min    Urinary retention    Acute on chronic renal failure    Acute UTI (urinary tract infection)    Acute on chronic diastolic (congestive) heart failure    Bacteriuria    Rectal pain    Status post total replacement of hip    Vitamin D deficiency    Post-acute COVID-19 syndrome    Insulin dose changed    Arthropathy associated with neurological disorder    Personal history of colonic polyps    Type 2 diabetes mellitus with diabetic neuropathy, with long-term current use of insulin    Cervical spinal stenosis    Abscess of skin    Overweight    Cervical stenosis of spine    Spinal stenosis, lumbar region, without neurogenic claudication    Medically noncompliant    Chronic heart failure with preserved ejection fraction (HFpEF)    Carotid stenosis    Peripheral arterial disease    Sepsis    Hyperkalemia    Metabolic encephalopathy    AMENA (acute kidney injury)     Past Medical History:   Diagnosis Date    AMENA (acute kidney  injury) 12/03/2020    Alcoholism 1989    not since 1998    CORI positive     Anemia     Anxiety     Ataxia     Atypical chest pain 04/19/2022    SEEN AT [facility] ER    Balance disorder     Carotid stenosis 3/28/2024    Cataract     BILATERAL, S/P EXTRACTION    Cervical radiculopathy 11/11/2019    SEEN AT  [facility] ER    Cervical spinal cord compression     Chronic diastolic (congestive) heart failure     Chronic kidney disease     STAGE 3, FOLLOWED BY DR. VIVAR    Chronic pancreatitis     Closed left subtrochanteric femur fracture 12/01/2020    ADMITTED TO [facility]    Closed nondisplaced intertrochanteric fracture of left femur 12/01/2020    ADMITTED TO [facility]    Colon polyps     FOLLOWED BY DR. AVTAR JEONG    Constipation     Contracture, right hand     Coronary artery disease     CABG 7/2019    COVID-19 07/2022    DDD (degenerative disc disease), cervical     Diabetes mellitus, type II     IDDM    Diabetic retinopathy     Difficulty walking     Dysphagia     Elevated brain natriuretic peptide (BNP) level 08/2014    Elevated LFTs 03/2021    Erectile dysfunction     Fissure, anal 2022    Foot drop     Fuchs' corneal dystrophy of right eye     Glaucoma     BILATERAL    History of alcohol abuse     Hyperlipidemia     Hypersomnia     Hypertension     Hypertensive urgency 02/25/2020    ADMITTED TO [facility]    Insomnia     Kidney stones     LV dysfunction 06/2016    Lyme disease     Lymphadenopathy syndrome 05/2021    Macular edema     BILATERAL    Myocardial infarction 11/05/2019    NSTEMI, ADMITTED TO [facility]    Myocardial infarction 07/12/2019    NSTEMI, ADMITTED TO [facility]    Neuropathy in diabetes     Non-celiac gluten sensitivity     Orthostasis     Osteoporosis     Oxygen dependent     PAD (peripheral artery disease)     PNA (pneumonia) 06/2016    LEFT LOBE    Polyneuropathy     PTSD (post-traumatic stress disorder)     Pulmonary hypertension     Pulmonary nodule     Senile ectropion of both lower eyelids 02/2022    Sepsis 12/16/2020    D/T  "UTI, ADMITTED TO [facility]    Sleep apnea     STATES DOESN'T USE BIPAP OR CPAP    Spinal stenosis in cervical region     SEVERE-LIMITED ROM    Stroke 2012    \"slight stroke\"    Syncope and collapse 07/06/2019    ADMITTED TO [facility]    Urinary retention 04/05/2021    SEEN AT [facility] ER    Vision loss     Vitamin D deficiency      Past Surgical History:   Procedure Laterality Date    CARDIAC CATHETERIZATION N/A 07/15/2019    Procedure: Coronary angiography;  Surgeon: Carrie Price MD;  Location:  [facility];  Service: Cardiovascular    CARDIAC CATHETERIZATION N/A 07/15/2019    Procedure: Left Heart Cath;  Surgeon: Carrie Price MD;  Location:  [facility];  Service: Cardiovascular    CARDIAC CATHETERIZATION N/A 07/15/2019    Procedure: Left ventriculography;  Surgeon: Carrie Price MD;  Location:  [facility];  Service: Cardiovascular    CARDIAC CATHETERIZATION  07/15/2019    Procedure: Functional Flow [place];  Surgeon: Carrie Price MD;  Location:  [facility];  Service: Cardiovascular    CARDIAC CATHETERIZATION N/A 11/06/2019    Procedure: Right and Left Heart Cath;  Surgeon: Mya Smith MD;  Location:  [facility];  Service: Cardiovascular    CARDIAC CATHETERIZATION N/A 11/06/2019    Procedure: Coronary angiography;  Surgeon: Mya Smith MD;  Location:  [facility];  Service: Cardiovascular    CARDIAC SURGERY      CATARACT EXTRACTION Left 2014    CATARACT EXTRACTION Right 11/2016    PHACO/IOL, DR. LEN DENTON    COLONOSCOPY N/A 03/16/2023    ENTIRE COLON WNL, RESCOPE IN 5 YRS, DR. LINDA LIZARRAGA AT [facility]    COLONOSCOPY W/ POLYPECTOMY N/A 01/02/2015    A FEW DIVERTICULA IN SIGMOID, 6 MM TUBULOVILLOUS ADENOMA POLYP IN RECTUM, SMALL HEMORRHOIDS, MELANOSIS COLI, DR. AVTAR JEONG AT [place] [facility]    CORONARY ARTERY BYPASS GRAFT N/A 07/18/2019    Procedure: INTRAOPERATIVE SHOAIB; STERNOTOMY CORONARY ARTERY " BYPASS x 3  USING LEFT INTERNAL MAMMARY ARTERY GRAFT UTILIZING ENDOSCOPICALLY HARVESTED RIGHT GREATER SAPHENOUS VEIN AND PRP.;  Surgeon: Bill Devi MD;  Location: [facility] MAIN OR;  Service: Cardiothoracic    CYSTOSCOPY BLADDER STONE LITHOTRIPSY N/A     DENTAL PROCEDURE Bilateral     3 surgeries inder implants    FEMUR IM NAILING/RODDING Left 12/03/2020    Procedure: LEFT HIP INTRAMEDULLARY NAIL;  Surgeon: Niraj Ravi MD;  Location: [facility] MAIN OR;  Service: Orthopedic Spine;  Laterality: Left;    INCISION AND DRAINAGE PERIRECTAL ABSCESS N/A 10/04/2023    Procedure: Incision and drainage of perianal and buttock abscess;  Surgeon: Herbie Villatoro MD;  Location: [facility] MAIN OR;  Service: General;  Laterality: N/A;    INGUINAL HERNIA REPAIR Bilateral     TOENAIL EXCISION  06/2022    TONSILLECTOMY Bilateral     TOTAL HIP ARTHROPLASTY REVISION Left 01/11/2022    Procedure: TOTAL HIP ARTHROPLASTY REVISION- POSTERIOR;  Surgeon: Jurgen Candelaria II, MD;  Location: [facility] MAIN OR;  Service: Orthopedics;  Laterality: Left;    VASECTOMY N/A       General Information       Row Name 05/31/24 1557          Physical Therapy Time and Intention    Document Type evaluation  -PC     Mode of Treatment physical therapy  -PC       Row Name 05/31/24 1460          General Information    Patient Profile Reviewed yes  -PC     Prior Level of Function min assist:;all household mobility;ADL's  -PC     Existing Precautions/Restrictions fall;oxygen therapy device and L/min  -PC       Row Name 05/31/24 7914          Living Environment    People in Home spouse  -PC       Row Name 05/31/24 1557          Home Main Entrance    Number of Stairs, Main Entrance none  -PC       Row Name 05/31/24 1557          Stairs Within Home, Primary    Number of Stairs, Within Home, Primary none  -PC       Row Name 05/31/24 5536          Cognition    Orientation Status (Cognition) oriented to;person;place  -PC       Row Name 05/31/24 3545           Safety Issues, Functional Mobility    Impairments Affecting Function (Mobility) balance;cognition;endurance/activity tolerance;strength;coordination;motor control;postural/trunk control;muscle tone abnormal  -PC     Cognitive Impairments, Mobility Safety/Performance impulsivity;insight into deficits/self-awareness;judgment  -PC               User Key  (r) = Recorded By, (t) = Taken By, (c) = Cosigned By      Initials Name Provider Type    PC Gemma Hamilton PT Physical Therapist                   Mobility       Row Name 05/31/24 1600          Bed Mobility    Bed Mobility sit-supine  -PC     Sit-Supine [place] (Bed Mobility) moderate assist (50% patient effort);maximum assist (25% patient effort);2 person assist  -PC       Row Name 05/31/24 1600          Sit-Stand Transfer    Sit-Stand [place] (Transfers) moderate assist (50% patient effort);2 person assist  -PC     Assistive Device (Sit-Stand Transfers) walker, front-wheeled  -PC     Comment, (Sit-Stand Transfer) mod a x 2 from low chair, pt with posterior lean  -PC       Row Name 05/31/24 1600          Gait/Stairs (Locomotion)    [place] Level (Gait) moderate assist (50% patient effort);2 person assist  -PC     Assistive Device (Gait) walker, front-wheeled  -PC     Distance in Feet (Gait) 5  -PC     Deviations/Abnormal Patterns (Gait) bilateral deviations;farhana decreased;gait speed decreased;stride length decreased;weight shifting decreased  -PC     Left Sided Gait Deviations forward flexed posture;heel strike decreased;foot drop/toe drag;knee hyperextension  -PC     Right Sided Gait Deviations foot drop/toe drag;forward flexed posture  -PC     Comment, (Gait/Stairs) very poor gait quality with poor ability to grasp walker with RUE, R hip is posteriorly rotated and R knee with R knee in severe hyperextension, walked 3 ft forward, it was unsafe, had to bring a chair up, pt was able to pivot from the chair to the bed  -PC               User  Key  (r) = Recorded By, (t) = Taken By, (c) = Cosigned By      Initials Name Provider Type    PC Gemma Hamilton, PT Physical Therapist                   Obj/Interventions       Row Name 05/31/24 1603          Range of Motion Comprehensive    Comment, General Range of Motion lacks full DF B, L LE is swollen and red  -PC       Row Name 05/31/24 1603          Strength Comprehensive (MMT)    Comment, General Manual Muscle Testing (MMT) Assessment LLE 3/5, RLE 3-/5, use of flexion synergy to move  -PC       Row Name 05/31/24 1603          Motor Skills    Motor Skills coordination;functional endurance;muscle tone  -PC     Coordination gross motor deficit;bilateral;lower extremity  -PC     Functional Endurance poor  -PC       Row Name 05/31/24 1603          Balance    Static Standing Balance minimal assist;moderate assist;2-person assist  -PC     Dynamic Standing Balance moderate assist;2-person assist  -PC       Row Name 05/31/24 1603          Sensory Assessment (Somatosensory)    Sensory Assessment (Somatosensory) bilateral LE  -PC     Bilateral LE Sensory Assessment general sensation;impaired  -PC               User Key  (r) = Recorded By, (t) = Taken By, (c) = Cosigned By      Initials Name Provider Type    PC Gemma Hamilton, PT Physical Therapist                   Goals/Plan       Row Name 05/31/24 1611          Bed Mobility Goal 1 (PT)    Activity/Assistive Device (Bed Mobility Goal 1, PT) sit to supine/supine to sit  -PC     [place] Level/Cues Needed (Bed Mobility Goal 1, PT) minimum assist (75% or more patient effort)  -PC     Time Frame (Bed Mobility Goal 1, PT) 1 week  -PC       Row Name 05/31/24 1611          Transfer Goal 1 (PT)    Activity/Assistive Device (Transfer Goal 1, PT) sit-to-stand/stand-to-sit  -PC     [place] Level/Cues Needed (Transfer Goal 1, PT) minimum assist (75% or more patient effort)  -PC     Time Frame (Transfer Goal 1, PT) 1 week  -PC       Row Name 05/31/24 1611          Gait  Training Goal 1 (PT)    Activity/Assistive Device (Gait Training Goal 1, PT) gait (walking locomotion);assistive device use  -PC     [place] Level (Gait Training Goal 1, PT) minimum assist (75% or more patient effort)  -PC     Distance (Gait Training Goal 1, PT) 20  -PC     Time Frame (Gait Training Goal 1, PT) 1 week  -PC       Row Name 05/31/24 1611          Therapy Assessment/Plan (PT)    Planned Therapy Interventions (PT) balance training;bed mobility training;gait training;transfer training;strengthening  -PC               User Key  (r) = Recorded By, (t) = Taken By, (c) = Cosigned By      Initials Name Provider Type    PC Gemma Hamilton PT Physical Therapist                   Clinical Impression       Row Name 05/31/24 1606          Pain    Pretreatment Pain Rating 0/10 - no pain  -PC       Row Name 05/31/24 1606          Plan of Care Review    Plan of Care Reviewed With patient;spouse  -PC     Outcome Evaluation Pt is a 75 yo male adm with AMS, found to have a UTI, sepsis, AMS, acute resp failure with hypoxia, pt and wife report that pt is on o2 at night only at home. 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 Wife reports that pt can walk with a rollator around the house with stand by assist at baseline. Pt was lethargic today, was sleeping on arrival but was easily aroused. Pt presented this afternoon with significant deficits with B LE weakness R > L, B foot drop, limited use of R UE, balance deificts and signficant gait deficits, requiring mod a x 2 to walk 3 ft and a chair had to be brought up. Pt will benefit from PT. Pt spouse reports strong desire to take pt home at discharge, PT will cont to follow and depending on progress, pt may need SNF at discharge  -       Row Name 05/31/24 1608          Therapy Assessment/Plan (PT)    Rehab Potential (PT) good, to achieve stated therapy goals  -     Criteria for Skilled Interventions Met (PT)  yes;meets criteria  -PC     Therapy Frequency (PT) 6 times/wk  -PC       Row Name 05/31/24 1606          Positioning and Restraints    Pre-Treatment Position sitting in chair/recliner  -PC     Post Treatment Position bed  -PC     In Bed supine;call light within reach;encouraged to call for assist;with family/caregiver;exit alarm on  -PC               User Key  (r) = Recorded By, (t) = Taken By, (c) = Cosigned By      Initials Name Provider Type    PC Gemma Hamilton, PT Physical Therapist                   Outcome Measures       Row Name 05/31/24 1612          How much help from another person do you currently need...    Turning from your back to your side while in flat bed without using bedrails? 2  -PC     Moving from lying on back to sitting on the side of a flat bed without bedrails? 2  -PC     Moving to and from a bed to a chair (including a wheelchair)? 2  -PC     Standing up from a chair using your arms (e.g., wheelchair, bedside chair)? 2  -PC     Climbing 3-5 steps with a railing? 1  -PC     To walk in hospital room? 2  -PC     AM-PAC 6 Clicks Score (PT) 11  -PC     Highest Level of Mobility Goal 4 --> Transfer to chair/commode  -PC       Row Name 05/31/24 1056          Modified Candler Scale    Pre-Stroke Modified Jeremias Scale 4 - Moderately severe disability.  Unable to walk without assistance, and unable to attend to own bodily needs without assistance.  -MM (r) MF (t) MM (c)       Row Name 05/31/24 1056          Functional Assessment    Outcome Measure Options AM-PAC 6 Clicks Daily Activity (OT);Modified [place]ler  -MM (r) MF (t) MM (c)               User Key  (r) = Recorded By, (t) = Taken By, (c) = Cosigned By      Initials Name Provider Type    PC Gemma Hamilton, PT Physical Therapist    Annita Hi OT Occupational Therapist    Annita Salinas, OT Student OT Student                                 Physical Therapy Education       Title: PT OT SLP Therapies (Done)       Topic: Physical  Therapy (Done)       Point: Mobility training (Done)       Learning Progress Summary             Patient Acceptance, E,D, DU by PC at 5/31/2024 1612                         Point: Home exercise program (Done)       Learning Progress Summary             Patient Acceptance, E,D, DU by PC at 5/31/2024 1612                         Point: Body mechanics (Done)       Learning Progress Summary             Patient Acceptance, E,D, DU by PC at 5/31/2024 1612                         Point: Precautions (Done)       Learning Progress Summary             Patient Acceptance, E,D, DU by  at 5/31/2024 1612                                         User Key       Initials Effective Dates Name Provider Type Discipline     06/16/21 -  Gemma Hamilton, PT Physical Therapist PT                  PT Recommendation and Plan  Planned Therapy Interventions (PT): balance training, bed mobility training, gait training, transfer training, strengthening  Plan of Care Reviewed With: patient, spouse  Outcome Evaluation: Pt is a 73 yo male adm with AMS, found to have a UTI, sepsis, AMS, acute resp failure with hypoxia, pt and wife report that pt is on o2 at night only at home. 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 Wife reports that pt can walk with a rollator around the house with stand by assist at baseline. Pt was lethargic today, was sleeping on arrival but was easily aroused. Pt presented this afternoon with significant deficits with B LE weakness R > L, B foot drop, limited use of R UE, balance deificts and signficant gait deficits, requiring mod a x 2 to walk 3 ft and a chair had to be brought up. Pt will benefit from PT. Pt spouse reports strong desire to take pt home at discharge, PT will cont to follow and depending on progress, pt may need SNF at discharge     Time Calculation:         PT Charges       Row Name 05/31/24 1613             Time Calculation    Start Time 1453  -       Stop Time 1515  -PC      Time Calculation (min) 22 min  -PC      PT Received On 05/31/24  -PC      PT - Next Appointment 06/01/24  -PC      PT Goal Re-Cert Due Date 06/07/24  -PC                User Key  (r) = Recorded By, (t) = Taken By, (c) = Cosigned By      Initials Name Provider Type    PC Gemma Hamilton, PT Physical Therapist                  Therapy Charges for Today       Code Description Service Date Service Provider Modifiers Qty    [id number]  PT EVAL MOD COMPLEXITY 2 5/31/2024 Gemma Hamilton, PT GP 1    [id number]  PT THER PROC EA 15 MIN 5/31/2024 Gemma Hamilton, PT GP 1            PT G-Codes  Outcome Measure Options: AM-PAC 6 Clicks Daily Activity (OT), Modified Jeremias  AM-PAC 6 Clicks Score (PT): 11  AM-PAC 6 Clicks Score (OT): 13  PT Discharge Summary  Anticipated Discharge Disposition (PT): home with assist, skilled nursing facility    Gemma Hamilton, PT  5/31/2024

## 2024-05-31 NOTE — CONSULTS
INITIAL CONSULT NOTE      Patient Name: Dilip Verma  : 1950  MRN: [id number]  Primary Care Physician: Fernando Camargo DO  Date of admission: 2024    Patient Care Team:  Fernando Camargo DO as PCP - General (Family Medicine)  Morris Cai MD as Consulting Physician (Sleep Medicine)  Michaela Hylton MD as Consulting Physician (Cardiology)  Hardy Banerjee MD as Consulting Physician (Ophthalmology)  Chula Christianson MD as Consulting Physician (Ophthalmology)  Jason Sherman MD (Endocrinology)  Perla Mayen MD as Consulting Physician (Nephrology)  Federico Hebert MD as Consulting Physician (Pulmonary Disease)  Niraj Ravi MD as Consulting Physician (Orthopedic Surgery)  Papa Velasco MD as Consulting Physician (Urology)  Terese Yost MD as Consulting Physician (Gastroenterology)  Aracelis Ball MD as Consulting Physician (Colon and Rectal Surgery)  [facility] as Primary Care Provider        Reason for Consult:       AMENA, CKD III    Subjective   History of Present Illness:   Chief Complaint:   Chief Complaint   Patient presents with    Altered Mental Status     HISTORY:  Patient is a 74-year-old gentleman with past medical history of CKD stage III with baseline sCr ~0.9-1.2 mg/dL, heart failure with preserved ejection fraction, CAD, hypertension, T2DM. Last TTE 10/5/22 with EF 66%, mild concentric LVH, grade II DD. Patient presented to [facility] with complaints of confusion, hallucinations, restlessness and dysuria for the past couple of days. CT AP/chest with contrast shows diffuse urinary bladder wall thickening, low lung volumes with bilateral opacities, likely benign 9mm subpleural nodule and likely reactive lymphadenopathy, nonspecific fat stranding left inguinal region, chronic appearing splenic vein thrombosis with peripheral calcification and multiple venous collaterals possible chronic pancreatitis, mild hepatic attenuation which could be  hepatocellular disease/cirrhosis, hiatal hernia.CT head WO negative for acute abnormalities. Labs significant for proBNP 2K, HS troponin 40, potassium 5.5, CO2 29, glucose 306, total bilirubin elevated at 1.3, lactate 2.2, procal 0.95, WBC 30.36, UA with 15 ketones, 1+ occult blood, large leukocytes, 100mg proteinuria, 3-5 RBCs, numerous WBCs, 4+ bacteria, urine cultures with gram negative bacilli. Patient started on IV antibiotics and IVF, hyperkalemia treated with IV insulin with improved to potassium of 4.4, admitted for further workup. Home medications include bumex. Nephrology consulted for AMENA, CKD III.    Review of systems:  Constitutional: Confusion, weakness.  HEENT:  No headache, otalgia, itchy eyes, nasal discharge or sore throat.  Cardiac:  No chest pain, dyspnea, orthopnea or PND.  Chest:              No cough, phlegm or wheezing.  Abdomen:  No abdominal pain, nausea or vomiting.  Neuro:  No focal weakness, abnormal movements orseizure like activity.  :   Dysuria  ROS was otherwise negative except as mentioned in the [place].       Personal History:     Past Medical History:   Past Medical History:   Diagnosis Date    AMENA (acute kidney injury) 12/03/2020    Alcoholism 1989    not since 1998    CORI positive     Anemia     Anxiety     Ataxia     Atypical chest pain 04/19/2022    SEEN AT [facility]    Balance disorder     Carotid stenosis 3/28/2024    Cataract     BILATERAL, S/P EXTRACTION    Cervical radiculopathy 11/11/2019    SEEN AT  [facility]    Cervical spinal cord compression     Chronic diastolic (congestive) heart failure     Chronic kidney disease     STAGE 3, FOLLOWED BY DR. VIVAR    Chronic pancreatitis     Closed left subtrochanteric femur fracture 12/01/2020    ADMITTED TO [facility]    Closed nondisplaced intertrochanteric fracture of left femur 12/01/2020    ADMITTED TO [facility]    Colon polyps     FOLLOWED BY DR. AVTAR JEONG    Constipation     Contracture, right hand     Coronary artery disease     CABG  "7/2019    COVID-19 07/2022    DDD (degenerative disc disease), cervical     Diabetes mellitus, type II     IDDM    Diabetic retinopathy     Difficulty walking     Dysphagia     Elevated brain natriuretic peptide (BNP) level 08/2014    Elevated LFTs 03/2021    Erectile dysfunction     Fissure, anal 2022    Foot drop     Fuchs' corneal dystrophy of right eye     Glaucoma     BILATERAL    History of alcohol abuse     Hyperlipidemia     Hypersomnia     Hypertension     Hypertensive urgency 02/25/2020    ADMITTED TO [facility]    Insomnia     Kidney stones     LV dysfunction 06/2016    Lyme disease     Lymphadenopathy syndrome 05/2021    Macular edema     BILATERAL    Myocardial infarction 11/05/2019    NSTEMI, ADMITTED TO [facility]    Myocardial infarction 07/12/2019    NSTEMI, ADMITTED TO [facility]    Neuropathy in diabetes     Non-celiac gluten sensitivity     Orthostasis     Osteoporosis     Oxygen dependent     PAD (peripheral artery disease)     PNA (pneumonia) 06/2016    LEFT LOBE    Polyneuropathy     PTSD (post-traumatic stress disorder)     Pulmonary hypertension     Pulmonary nodule     Senile ectropion of both lower eyelids 02/2022    Sepsis 12/16/2020    D/T UTI, ADMITTED TO [facility]    Sleep apnea     STATES DOESN'T USE BIPAP OR CPAP    Spinal stenosis in cervical region     SEVERE-LIMITED ROM    Stroke 2012    \"slight stroke\"    Syncope and collapse 07/06/2019    ADMITTED TO [place]    Urinary retention 04/05/2021    SEEN AT [facility] ER    Vision loss     Vitamin D deficiency        Surgical History:      Past Surgical History:   Procedure Laterality Date    CARDIAC CATHETERIZATION N/A 07/15/2019    Procedure: Coronary angiography;  Surgeon: Carrie Price MD;  Location:  [facility];  Service: Cardiovascular    CARDIAC CATHETERIZATION N/A 07/15/2019    Procedure: Left Heart Cath;  Surgeon: Carrie Price MD;  Location:  [facility];  Service: Cardiovascular    CARDIAC CATHETERIZATION N/A " 07/15/2019    Procedure: Left ventriculography;  Surgeon: Carrie Price MD;  Location:  [facility];  Service: Cardiovascular    CARDIAC CATHETERIZATION  07/15/2019    Procedure: Functional Flow Ryder;  Surgeon: Carrie Price MD;  Location:  [facility];  Service: Cardiovascular    CARDIAC CATHETERIZATION N/A 11/06/2019    Procedure: Right and Left Heart Cath;  Surgeon: Mya Smith MD;  [facility];  Service: Cardiovascular    CARDIAC CATHETERIZATION N/A 11/06/2019    Procedure: Coronary angiography;  Surgeon: Mya Smith MD;  [facility];  Service: Cardiovascular    CARDIAC SURGERY      CATARACT EXTRACTION Left 2014    CATARACT EXTRACTION Right 11/2016    PHACO/IOL, DR. LEN DENTON    COLONOSCOPY N/A 03/16/2023    ENTIRE COLON WNL, RESCOPE IN 5 YRS, DR. LINDA LIZARRAGA AT [facility]    COLONOSCOPY W/ POLYPECTOMY N/A 01/02/2015    A FEW DIVERTICULA IN SIGMOID, 6 MM TUBULOVILLOUS ADENOMA POLYP IN RECTUM, SMALL HEMORRHOIDS, MELANOSIS COLI, DR. AVTAR JEONG AT [facility]    CORONARY ARTERY BYPASS GRAFT N/A 07/18/2019    Procedure: INTRAOPERATIVE SHOAIB; STERNOTOMY CORONARY ARTERY BYPASS x 3  USING LEFT INTERNAL MAMMARY ARTERY GRAFT UTILIZING ENDOSCOPICALLY HARVESTED RIGHT GREATER SAPHENOUS VEIN AND PRP.;  Surgeon: Bill Devi MD;  Location: [facility] MAIN OR;  Service: Cardiothoracic    CYSTOSCOPY BLADDER STONE LITHOTRIPSY N/A     DENTAL PROCEDURE Bilateral     3 surgeries inder implants    FEMUR IM NAILING/RODDING Left 12/03/2020    Procedure: LEFT HIP INTRAMEDULLARY NAIL;  Surgeon: Niraj Ravi MD;  Location: [facility] MAIN OR;  Service: Orthopedic Spine;  Laterality: Left;    INCISION AND DRAINAGE PERIRECTAL ABSCESS N/A 10/04/2023    Procedure: Incision and drainage of perianal and buttock abscess;  Surgeon: Herbie Villatoro MD;  Location: [facility] MAIN OR;  Service: General;  Laterality: N/A;    INGUINAL  HERNIA REPAIR Bilateral     TOENAIL EXCISION  06/2022    TONSILLECTOMY Bilateral     TOTAL HIP ARTHROPLASTY REVISION Left 01/11/2022    Procedure: TOTAL HIP ARTHROPLASTY REVISION- POSTERIOR;  Surgeon: Jurgen Candelaria II, MD;  [facility];  Service: Orthopedics;  Laterality: Left;    VASECTOMY N/A        Family History: family history includes Alcohol abuse in his brother and paternal grandfather; Arrhythmia in his mother; Cancer in his mother; Depression in his mother and sister; Glaucoma in his maternal grandmother; Heart attack in his paternal grandmother; Heart disease in his father and mother; Heart failure in his father; Hyperlipidemia in his father and mother; Hypertension in his father and mother; Kidney disease in his father; Lung disease in his paternal uncle; Mental illness in his mother; Migraines in his mother; Stroke in his maternal grandmother and paternal grandmother; Thyroid disease in his father and paternal uncle; Uterine cancer in his mother. Otherwise pertinent FHx was reviewed and unremarkable.     Social History:  reports that he quit smoking about 24 years ago. His smoking use included cigarettes. He started smoking about 40 years ago. He has a 12 pack-year smoking history. He has been exposed to tobacco smoke. He has never used smokeless tobacco. He reports that he does not currently use alcohol. He reports that he does not use drugs.    Medications:  Prior to Admission medications    Medication Sig Start Date End Date Taking? Authorizing Provider   aspirin 81 MG EC tablet Take 1 tablet by mouth Every Night.   Yes Heri Rinaldi MD   brimonidine (ALPHAGAN) 0.2 % ophthalmic solution Administer 1 drop to both eyes 2 (Two) Times a Day.   Yes Heri Rinaldi MD   bumetanide (BUMEX) 1 MG tablet Take 1 tablet by mouth Daily.   Yes Heri Rinaldi MD   Cholecalciferol (Vitamin D-3) 125 MCG (5000 UT) tablet Take 1 tablet by mouth Daily.   Yes Gentry  MD Heri   DULoxetine (CYMBALTA) 30 MG capsule Take 1 capsule by mouth Every Night. 1/16/24  Yes Heri Rinaldi MD   Insulin Glargine, 2 Unit Dial, (TOUJEO) 300 UNIT/ML solution pen-injector injection Inject 24 Units under the skin into the appropriate area as directed Daily.   Yes Heri Rinaldi MD   insulin lispro (humaLOG) 100 UNIT/ML injection Inject 0-14 Units under the skin into the appropriate area as directed 3 (Three) Times a Day Before Meals.  Patient taking differently: Inject 0-14 Units under the skin into the appropriate area as directed 3 (Three) Times a Day Before Meals. SLIDING SCALE  100-150 - 4 units  151-200 - 5 units  201-250 - 6 units  251-300 - 7 units  301-350 - 8 units  351-400 - 9 units  401+ - 10 units 12/7/20  Yes Amador Valverde MD   latanoprost (XALATAN) 0.005 % ophthalmic solution Administer 1 drop to both eyes every night at bedtime. 1/16/23  Yes Heri Rinaldi MD   multivitamin with minerals (MULTIVITAMIN ADULTS PO) Take 1 tablet by mouth Daily.   Yes Heri Rinaldi MD   testosterone (ANDROGEL) 25 MG/2.5GM (1%) gel gel Place 25 mg on the skin as directed by provider 2 (Two) Times a Week.   Yes Heri Rinaldi MD   traMADol (ULTRAM) 50 MG tablet Take 1 tablet by mouth At Night As Needed. 10/16/23  Yes Heri Rinaldi MD   Magnesium 400 MG capsule Take 400 mg by mouth As Needed.    Heri Rinaldi MD   sildenafil (REVATIO) 20 MG tablet Take 1 tablet by mouth As Needed.    Heri Rinaldi MD     Scheduled Meds:aspirin, 81 mg, Oral, Daily  brimonidine, 1 drop, Both Eyes, BID  cefTRIAXone, 2,000 mg, Intravenous, Q24H  DULoxetine, 30 mg, Oral, Daily  enoxaparin, 40 mg, Subcutaneous, Daily  insulin glargine, 24 Units, Subcutaneous, Daily  insulin lispro, 2-9 Units, Subcutaneous, 4x Daily AC & at Bedtime  latanoprost, 1 drop, Both Eyes, Nightly  OLANZapine, 5 mg, Oral, Nightly  senna-docusate sodium, 2 tablet, Oral, BID  sodium  chloride, 10 mL, Intravenous, Q12H      Continuous Infusions:sodium chloride, 75 mL/hr, Last Rate: 75 mL/hr (05/31/24 1100)      PRN Meds:  acetaminophen **OR** acetaminophen **OR** acetaminophen    senna-docusate sodium **AND** polyethylene glycol **AND** bisacodyl **AND** bisacodyl    dextrose    dextrose    glucagon (human recombinant)    nitroglycerin    OLANZapine    ondansetron    sodium chloride    sodium chloride  Allergies:    Allergies   Allergen Reactions    Ace Inhibitors Angioedema    Angiotensin Receptor Blockers Angioedema and Unknown (See Comments)     Angioneurotic edema    Dapagliflozin Other (See Comments)     UTI,  rectal abcess    Losartan Angioedema     Angioneurotic edema    Seroquel [Quetiapine] Hallucinations    Xanax [Alprazolam] Unknown - High Severity    Amlodipine Swelling    Ativan [Lorazepam] Hallucinations    Baclofen Hallucinations    Misc. Sulfonamide Containing Compounds Unknown (See Comments)    Minoxidil Confusion    Tetracycline Rash     bliisters in mouth         Objective   Exam:     Vital Signs  Temp:  [97.3 °F (36.3 °C)-99.7 °F (37.6 °C)] 97.4 °F (36.3 °C)  Heart Rate:  [] 80  Resp:  [18] 18  BP: ()/() 133/82  SpO2:  [91 %-96 %] 96 %  on  Flow (L/min):  [4] 4;   Device (Oxygen Therapy): nasal cannula  Body mass index is 31.14 kg/m².  EXAM  General:  Chronically ill appearing male in no acute distress.    Head:      Normocephalic and atraumatic.    Eyes:      PERRL/EOM intact, conjunctivae and sclerae clear without nystagmus.    Neck:      No masses, thyromegaly,  trachea central   Lungs:    Clear bilaterally to auscultation.    Heart:      Regular rate and rhythm, no murmur no gallop  Abd:        Soft, nontender, not distended, bowel sounds positive, no shifting dullness.  Msk:        No deformity or scoliosis noted of thoracic or lumbar spine.    Pulses:   Pulses normal in all 4 extremities.    Extremities:        No cyanosis or clubbing- no edema.     Neuro:    No focal deficits.   alert oriented x3  Skin:       Intact without lesions or rashes.    Psych:    Alert and cooperative; normal mood and affect; normal attention span       Results Review:  I have personally reviewed most recent Data :  [facility]  CBC    Results from last 7 days   Lab Units 05/31/24  0620 05/30/24  0433   WBC 10*3/mm3 14.38* 30.36*   HEMOGLOBIN g/dL 13.4 16.4   PLATELETS 10*3/mm3 184 230     CMP   Results from last 7 days   Lab Units 05/31/24  0620 05/30/24  0955 05/30/24  0433   SODIUM mmol/L 137 140 138   POTASSIUM mmol/L 4.4 4.0 5.5*   CHLORIDE mmol/L 102 107 98   CO2 mmol/L 27.0 24.6 29.2*   BUN mg/dL 32* 22 24*   CREATININE mg/dL 1.51* 0.90 1.17   GLUCOSE mg/dL 97 228* 306*   ALBUMIN g/dL 3.3*  --  4.2   BILIRUBIN mg/dL 0.6  --  1.3*   ALK PHOS U/L 60  --  86   AST (SGOT) U/L 30  --  33   ALT (SGPT) U/L 18  --  31   AMMONIA umol/L  --   --  20     ABG    Results from last 7 days   Lab Units 05/30/24  0505   PH, ARTERIAL pH units 7.420   PCO2, ARTERIAL mm Hg 44.0   PO2 ART mm Hg 56.9*   O2 SATURATION ART % 89.5*   BASE EXCESS ART mmol/L 3.3*     CT Abdomen Pelvis With Contrast    Result Date: 5/30/2024   1. Diffuse urinary bladder wall thickening and mild perivesical fat stranding, which could be accentuated by under distention but could reflect a nonspecific cystitis but recommend correlating with urinalysis. 2. Low lung volumes with multifocal bilateral perihilar and bibasilar opacities that likely represent subsegmental atelectasis and/or scarring. 3. Likely benign 9 mm subpleural nodule in the base of the lingula and likely reactive mediastinal and bilateral hilar lymphadenopathy given the long-term stability. 4. Nonspecific fat stranding in the left inguinal region. Correlate for recent trauma or instrumentation. 5. Chronic appearing splenic vein thrombosis with peripheral calcification and multiple venous collaterals in the left upper quadrant. Additional  pancreatic/peripancreatic calcifications could reflect sequela of chronic pancreatitis. 6. Mildly heterogeneous hepatic attenuation with subtle nodular liver contours, which could reflect underlying hepatocellular disease/cirrhosis. 7. Tiny hiatal hernia with mild nonspecific thickening of the distal esophageal wall and mild gastric wall thickening that could be accentuated by under distention. Correlate for GERD and possible gastritis.  This report was finalized on 5/30/2024 7:35 AM by Julio Thomas MD on Workstation: [id number]      CT Chest With Contrast Diagnostic    Result Date: 5/30/2024   1. Diffuse urinary bladder wall thickening and mild perivesical fat stranding, which could be accentuated by under distention but could reflect a nonspecific cystitis but recommend correlating with urinalysis. 2. Low lung volumes with multifocal bilateral perihilar and bibasilar opacities that likely represent subsegmental atelectasis and/or scarring. 3. Likely benign 9 mm subpleural nodule in the base of the lingula and likely reactive mediastinal and bilateral hilar lymphadenopathy given the long-term stability. 4. Nonspecific fat stranding in the left inguinal region. Correlate for recent trauma or instrumentation. 5. Chronic appearing splenic vein thrombosis with peripheral calcification and multiple venous collaterals in the left upper quadrant. Additional pancreatic/peripancreatic calcifications could reflect sequela of chronic pancreatitis. 6. Mildly heterogeneous hepatic attenuation with subtle nodular liver contours, which could reflect underlying hepatocellular disease/cirrhosis. 7. Tiny hiatal hernia with mild nonspecific thickening of the distal esophageal wall and mild gastric wall thickening that could be accentuated by under distention. Correlate for GERD and possible gastritis.  This report was finalized on 5/30/2024 7:35 AM by Julio Thomas MD on Workstation: [id number]      CT Head Without  Contrast    Result Date: 5/30/2024   No acute process identified.    Radiation dose reduction techniques were utilized, including automated exposure control and exposure modulation based on body size.       XR Chest 1 View    Result Date: 5/30/2024  Nonspecific bibasilar consolidation.  This report was finalized on 5/30/2024 4:47 AM by Dr. Gayle Okeefe M.D on Workstation: [id number]       Results for orders placed during the hospital encounter of 10/05/22    Adult Transthoracic Echo Complete W/ Cont if Necessary Per Protocol    Interpretation Summary  · Calculated left ventricular EF = 66.1% Estimated left ventricular EF was in agreement with the calculated left ventricular EF. Left ventricular systolic function is normal.Left ventricular wall thickness is consistent with mild concentric hypertrophy.Abnormal global longitudinal LV strain (GLS) = -12.3%.  · Left ventricular diastolic function is consistent with (grade II w/high LAP) pseudonormalization.Elevated left atrial pressure.  · The aortic valve exhibits sclerosis. There is restricted mobility of the right coronary cusp. There is no significant stenosis or regurgitation noted.        Assessment & Plan   Assessment and Plan:         Acute UTI (urinary tract infection)    Type 2 diabetes mellitus with hyperglycemia, with long-term current use of insulin    CAD (coronary artery disease)    Hx of CABG    Chronic diastolic heart failure    CSA (central sleep apnea)    HTN (hypertension)    History of stroke    CKD (chronic kidney disease) stage 3, GFR 30-59 ml/min    Peripheral arterial disease    Sepsis    Hyperkalemia    Metabolic encephalopathy    AMENA (acute kidney injury)    ASSESSMENT:  AMENA likely prerenal ATN 2/2 urosepsis with hemodynamic changes; on CKD III etiology likely diabetic and hypertensive nephropathy with baseline sCr ~0.9-1.2 mg/dL  Hyperkalemia, now resolved  UTI, urine cultures with gram neg bacilli  Leukocytosis  Metabolic  encephalopathy  PAD  Hx stroke  HTN  CHF  CAD  DM2  Chronic splenic vein thrombosis  Possible hepatocellular disease/cirrhosis  Likely benign subpleural nodule with bilateral lymphadenopathy  Hiatal hernia    Last TTE 10/5/22 with EF 66%, grade II DD    PLAN :     AMENA likely prerenal ATN 2/2 urosepsis with hemodynamic changes; on CKD III etiology likely diabetic and hypertensive nephropathy with baseline sCr ~0.9-1.2 mg/dL  Home bumex on hold for now, agree  Continue IVF NS @75cc/hr for now  Volume with CXR with bibasilar consolidation, on NC4L, LE edema, will likely need to restart bumex once renal function improves  Hyperkalemia resolved with IV insulin  Electrolytes, acid/base acceptable  UTI on empiric antibiotics per primary  Follow up blood cultures  BP trends acceptable  Strict I&O's  Avoid NSAIDs, nephrotoxic agents  We will follow and coordinate with team    Thank you kindly for this consultation!    Note transcribed for Dr Mayen    I have , personally performed the services described in this documentation as scribed by the above named individual in my presence, and it is both accurate and complete.  5/31/2024  15:50 EDT      LEIDY Fraser  [facility]  5/31/2024  12:31 EDT

## 2024-05-31 NOTE — PROGRESS NOTES
Dedicated to [facility]    [phone]   LOS: 1 day     Name: Dilip Verma  Age/Sex: 74 y.o. male  :  1950        PCP: Fernando Camargo DO  Chief Complaint   Patient presents with    Altered Mental Status      Subjective   He feels sleepy this morning.  Restless and anxious overnight.  Noted urinary retention requiring straight cath overnight.  General: No Fever or Chills, Cardiac: No Chest Pain or Palpitations, Resp: No Cough or SOA, GI: No Nausea, Vomiting, or Diarrhea, and Other: No bleeding    aspirin, 81 mg, Oral, Daily  brimonidine, 1 drop, Both Eyes, BID  cefTRIAXone, 2,000 mg, Intravenous, Q24H  DULoxetine, 30 mg, Oral, Daily  enoxaparin, 40 mg, Subcutaneous, Daily  insulin glargine, 24 Units, Subcutaneous, Daily  insulin lispro, 2-9 Units, Subcutaneous, 4x Daily AC & at Bedtime  latanoprost, 1 drop, Both Eyes, Nightly  OLANZapine, 5 mg, Oral, Nightly  senna-docusate sodium, 2 tablet, Oral, BID  sodium chloride, 10 mL, Intravenous, Q12H      sodium chloride, 75 mL/hr, Last Rate: 75 mL/hr (24 1100)        Objective   Vital Signs  Temp:  [97.3 °F (36.3 °C)-99.7 °F (37.6 °C)] 97.5 °F (36.4 °C)  Heart Rate:  [79-96] 88  Resp:  [18] 18  BP: ()/(60-92) 151/92  Body mass index is 31.14 kg/m².    Intake/Output Summary (Last 24 hours) at 2024 1702  Last data filed at 2024 1613  Gross per 24 hour   Intake 200 ml   Output 800 ml   Net -600 ml       Physical Exam  Constitutional:       General: He is not in acute distress.     Appearance: He is obese. He is ill-appearing.   Cardiovascular:      Rate and Rhythm: Normal rate and regular rhythm.   Pulmonary:      Effort: No respiratory distress.      Breath sounds: Normal breath sounds.   Abdominal:      General: Bowel sounds are normal.      Palpations: Abdomen is soft.   Neurological:      Mental Status: Mental status is at baseline.           Results Review:       I reviewed the patient's new clinical results.  Results from last 7  days   Lab Units 05/31/24  0620 05/30/24  0433   WBC 10*3/mm3 14.38* 30.36*   HEMOGLOBIN g/dL 13.4 16.4   PLATELETS 10*3/mm3 184 230     Results from last 7 days   Lab Units 05/31/24  0620 05/30/24  0955 05/30/24  0433   SODIUM mmol/L 137 140 138   POTASSIUM mmol/L 4.4 4.0 5.5*   CHLORIDE mmol/L 102 107 98   CO2 mmol/L 27.0 24.6 29.2*   BUN mg/dL 32* 22 24*   CREATININE mg/dL 1.51* 0.90 1.17   CALCIUM mg/dL 8.8 7.3* 9.6   MAGNESIUM mg/dL  --   --  1.9   Estimated Creatinine Clearance: 46.8 mL/min (A) (by C-G formula based on SCr of 1.51 mg/dL (H)).      Assessment & Plan   Active Hospital Problems    Diagnosis  POA    **Acute UTI (urinary tract infection) [N39.0]  Yes    AMENA (acute kidney injury) [N17.9]  Unknown    Sepsis [A41.9]  Yes    Hyperkalemia [E87.5]  Unknown    Metabolic encephalopathy [G93.41]  Unknown    Peripheral arterial disease [I73.9]  Yes    History of stroke [Z86.73]  Not Applicable    CKD (chronic kidney disease) stage 3, GFR 30-59 ml/min [N18.30]  Yes    HTN (hypertension) [I10]  Yes    CSA (central sleep apnea) [G47.31]  Yes    Chronic diastolic heart failure [I50.32]  Yes    Hx of CABG [Z95.1]  Not Applicable    CAD (coronary artery disease) [I25.10]  Yes    Type 2 diabetes mellitus with hyperglycemia, with long-term current use of insulin [E11.65, Z79.4]  Not Applicable      Resolved Hospital Problems   No resolved problems to display.       PLAN  This is a 74-year-old gentleman with a history of chronic kidney disease hypertension coronary artery disease type 2 diabetes peripheral artery disease who presents to the hospital with metabolic encephalopathy and is found to have sepsis and acute renal failure secondary to underlying urinary tract infection  -Urine cultures growing gram-negative rods although it is not a great colony count at less than 50,000.  -Continue IV antibiotics.  PSA was within normal limits no evidence of prostatitis.  He does have some urinary retention which is also  contributing to his acute renal failure.  -Continue to cath as needed.  Encourage out of bed activity as this will help with urinary retention.  Hytrin started today  -Baseline creatinine around 1.1 up to 1.5 today.  Nephrology consulted and workup sent  -Mechanical DVT prophylaxis  -Full code      Disposition  Expected Discharge Date: 6/4/2024; Expected Discharge Time:        Que Gregg MD  [facility]  05/31/24  17:02 EDT           Hemostasis: Electrocautery

## 2024-06-01 ENCOUNTER — APPOINTMENT (OUTPATIENT)
Dept: CARDIOLOGY | Facility: HOSPITAL | Age: 74
End: 2024-06-01
Payer: MEDICARE

## 2024-06-01 LAB
ANION GAP SERPL CALCULATED.3IONS-SCNC: 8.6 MMOL/L (ref 5–15)
BASOPHILS # BLD AUTO: 0.01 10*3/MM3 (ref 0–0.2)
BASOPHILS NFR BLD AUTO: 0.1 % (ref 0–1.5)
BH CV LOWER VASCULAR LEFT COMMON FEMORAL AUGMENT: NORMAL
BH CV LOWER VASCULAR LEFT COMMON FEMORAL COMPETENT: NORMAL
BH CV LOWER VASCULAR LEFT COMMON FEMORAL COMPRESS: NORMAL
BH CV LOWER VASCULAR LEFT COMMON FEMORAL PHASIC: NORMAL
BH CV LOWER VASCULAR LEFT COMMON FEMORAL SPONT: NORMAL
BH CV LOWER VASCULAR LEFT DISTAL FEMORAL COMPRESS: NORMAL
BH CV LOWER VASCULAR LEFT GASTRONEMIUS COMPRESS: NORMAL
BH CV LOWER VASCULAR LEFT GREATER SAPH AK COMPRESS: NORMAL
BH CV LOWER VASCULAR LEFT GREATER SAPH BK COMPRESS: NORMAL
BH CV LOWER VASCULAR LEFT LESSER SAPH COMPRESS: NORMAL
BH CV LOWER VASCULAR LEFT MID FEMORAL AUGMENT: NORMAL
BH CV LOWER VASCULAR LEFT MID FEMORAL COMPETENT: NORMAL
BH CV LOWER VASCULAR LEFT MID FEMORAL COMPRESS: NORMAL
BH CV LOWER VASCULAR LEFT MID FEMORAL PHASIC: NORMAL
BH CV LOWER VASCULAR LEFT MID FEMORAL SPONT: NORMAL
BH CV LOWER VASCULAR LEFT PERONEAL COMPRESS: NORMAL
BH CV LOWER VASCULAR LEFT POPLITEAL AUGMENT: NORMAL
BH CV LOWER VASCULAR LEFT POPLITEAL COMPETENT: NORMAL
BH CV LOWER VASCULAR LEFT POPLITEAL COMPRESS: NORMAL
BH CV LOWER VASCULAR LEFT POPLITEAL PHASIC: NORMAL
BH CV LOWER VASCULAR LEFT POPLITEAL SPONT: NORMAL
BH CV LOWER VASCULAR LEFT POSTERIOR TIBIAL COMPRESS: NORMAL
BH CV LOWER VASCULAR LEFT PROFUNDA FEMORAL COMPRESS: NORMAL
BH CV LOWER VASCULAR LEFT PROXIMAL FEMORAL COMPRESS: NORMAL
BH CV LOWER VASCULAR LEFT SAPHENOFEMORAL JUNCTION COMPRESS: NORMAL
BH CV LOWER VASCULAR RIGHT COMMON FEMORAL AUGMENT: NORMAL
BH CV LOWER VASCULAR RIGHT COMMON FEMORAL COMPETENT: NORMAL
BH CV LOWER VASCULAR RIGHT COMMON FEMORAL COMPRESS: NORMAL
BH CV LOWER VASCULAR RIGHT COMMON FEMORAL PHASIC: NORMAL
BH CV LOWER VASCULAR RIGHT COMMON FEMORAL SPONT: NORMAL
BH CV LOWER VASCULAR RIGHT DISTAL FEMORAL COMPRESS: NORMAL
BH CV LOWER VASCULAR RIGHT GASTRONEMIUS COMPRESS: NORMAL
BH CV LOWER VASCULAR RIGHT GREATER SAPH AK COMPRESS: NORMAL
BH CV LOWER VASCULAR RIGHT GREATER SAPH BK COMPRESS: NORMAL
BH CV LOWER VASCULAR RIGHT LESSER SAPH COMPRESS: NORMAL
BH CV LOWER VASCULAR RIGHT MID FEMORAL AUGMENT: NORMAL
BH CV LOWER VASCULAR RIGHT MID FEMORAL COMPETENT: NORMAL
BH CV LOWER VASCULAR RIGHT MID FEMORAL COMPRESS: NORMAL
BH CV LOWER VASCULAR RIGHT MID FEMORAL PHASIC: NORMAL
BH CV LOWER VASCULAR RIGHT MID FEMORAL SPONT: NORMAL
BH CV LOWER VASCULAR RIGHT PERONEAL COMPRESS: NORMAL
BH CV LOWER VASCULAR RIGHT POPLITEAL AUGMENT: NORMAL
BH CV LOWER VASCULAR RIGHT POPLITEAL COMPETENT: NORMAL
BH CV LOWER VASCULAR RIGHT POPLITEAL COMPRESS: NORMAL
BH CV LOWER VASCULAR RIGHT POPLITEAL PHASIC: NORMAL
BH CV LOWER VASCULAR RIGHT POPLITEAL SPONT: NORMAL
BH CV LOWER VASCULAR RIGHT POSTERIOR TIBIAL COMPRESS: NORMAL
BH CV LOWER VASCULAR RIGHT PROFUNDA FEMORAL COMPRESS: NORMAL
BH CV LOWER VASCULAR RIGHT PROXIMAL FEMORAL COMPRESS: NORMAL
BH CV LOWER VASCULAR RIGHT SAPHENOFEMORAL JUNCTION COMPRESS: NORMAL
BUN SERPL-MCNC: 31 MG/DL (ref 8–23)
BUN/CREAT SERPL: 25.4 (ref 7–25)
CALCIUM SPEC-SCNC: 8.3 MG/DL (ref 8.6–10.5)
CHLORIDE SERPL-SCNC: 106 MMOL/L (ref 98–107)
CO2 SERPL-SCNC: 23.4 MMOL/L (ref 22–29)
CREAT SERPL-MCNC: 1.22 MG/DL (ref 0.76–1.27)
DEPRECATED RDW RBC AUTO: 41.9 FL (ref 37–54)
EGFRCR SERPLBLD CKD-EPI 2021: 61.8 ML/MIN/1.73
EOSINOPHIL # BLD AUTO: 0.22 10*3/MM3 (ref 0–0.4)
EOSINOPHIL NFR BLD AUTO: 2.7 % (ref 0.3–6.2)
ERYTHROCYTE [DISTWIDTH] IN BLOOD BY AUTOMATED COUNT: 12.5 % (ref 12.3–15.4)
GLUCOSE BLDC GLUCOMTR-MCNC: 125 MG/DL (ref 70–130)
GLUCOSE BLDC GLUCOMTR-MCNC: 147 MG/DL (ref 70–130)
GLUCOSE BLDC GLUCOMTR-MCNC: 168 MG/DL (ref 70–130)
GLUCOSE BLDC GLUCOMTR-MCNC: 195 MG/DL (ref 70–130)
GLUCOSE SERPL-MCNC: 166 MG/DL (ref 65–99)
HCT VFR BLD AUTO: 38.4 % (ref 37.5–51)
HGB BLD-MCNC: 13.2 G/DL (ref 13–17.7)
IMM GRANULOCYTES # BLD AUTO: 0.02 10*3/MM3 (ref 0–0.05)
IMM GRANULOCYTES NFR BLD AUTO: 0.2 % (ref 0–0.5)
LYMPHOCYTES # BLD AUTO: 0.96 10*3/MM3 (ref 0.7–3.1)
LYMPHOCYTES NFR BLD AUTO: 11.6 % (ref 19.6–45.3)
MCH RBC QN AUTO: 31.8 PG (ref 26.6–33)
MCHC RBC AUTO-ENTMCNC: 34.4 G/DL (ref 31.5–35.7)
MCV RBC AUTO: 92.5 FL (ref 79–97)
MONOCYTES # BLD AUTO: 0.63 10*3/MM3 (ref 0.1–0.9)
MONOCYTES NFR BLD AUTO: 7.6 % (ref 5–12)
NEUTROPHILS NFR BLD AUTO: 6.42 10*3/MM3 (ref 1.7–7)
NEUTROPHILS NFR BLD AUTO: 77.8 % (ref 42.7–76)
NRBC BLD AUTO-RTO: 0 /100 WBC (ref 0–0.2)
PLATELET # BLD AUTO: 168 10*3/MM3 (ref 140–450)
PMV BLD AUTO: 11.3 FL (ref 6–12)
POTASSIUM SERPL-SCNC: 4.5 MMOL/L (ref 3.5–5.2)
RBC # BLD AUTO: 4.15 10*6/MM3 (ref 4.14–5.8)
SODIUM SERPL-SCNC: 138 MMOL/L (ref 136–145)
WBC NRBC COR # BLD AUTO: 8.26 10*3/MM3 (ref 3.4–10.8)

## 2024-06-01 PROCEDURE — 85025 COMPLETE CBC W/AUTO DIFF WBC: CPT | Performed by: HOSPITALIST

## 2024-06-01 PROCEDURE — 63710000001 INSULIN GLARGINE PER 5 UNITS: Performed by: INTERNAL MEDICINE

## 2024-06-01 PROCEDURE — 25010000002 ENOXAPARIN PER 10 MG: Performed by: INTERNAL MEDICINE

## 2024-06-01 PROCEDURE — 80048 BASIC METABOLIC PNL TOTAL CA: CPT

## 2024-06-01 PROCEDURE — 93970 EXTREMITY STUDY: CPT

## 2024-06-01 PROCEDURE — 25010000002 CEFTRIAXONE PER 250 MG: Performed by: INTERNAL MEDICINE

## 2024-06-01 PROCEDURE — 93970 EXTREMITY STUDY: CPT | Performed by: SURGERY

## 2024-06-01 PROCEDURE — 63710000001 INSULIN LISPRO (HUMAN) PER 5 UNITS: Performed by: INTERNAL MEDICINE

## 2024-06-01 PROCEDURE — 82948 REAGENT STRIP/BLOOD GLUCOSE: CPT

## 2024-06-01 PROCEDURE — 25810000003 SODIUM CHLORIDE 0.9 % SOLUTION: Performed by: HOSPITALIST

## 2024-06-01 RX ORDER — TRAMADOL HYDROCHLORIDE 50 MG/1
50 TABLET ORAL EVERY 6 HOURS PRN
Status: DISPENSED | OUTPATIENT
Start: 2024-06-01 | End: 2024-06-06

## 2024-06-01 RX ORDER — HYDRALAZINE HYDROCHLORIDE 25 MG/1
25 TABLET, FILM COATED ORAL EVERY 8 HOURS SCHEDULED
Status: DISCONTINUED | OUTPATIENT
Start: 2024-06-01 | End: 2024-06-12

## 2024-06-01 RX ADMIN — TERAZOSIN HYDROCHLORIDE 1 MG: 1 CAPSULE ORAL at 20:53

## 2024-06-01 RX ADMIN — ASPIRIN 81 MG: 81 TABLET, COATED ORAL at 20:53

## 2024-06-01 RX ADMIN — CEFTRIAXONE 2000 MG: 2 INJECTION, POWDER, FOR SOLUTION INTRAMUSCULAR; INTRAVENOUS at 11:23

## 2024-06-01 RX ADMIN — BRIMONIDINE TARTRATE 1 DROP: 2 SOLUTION OPHTHALMIC at 20:53

## 2024-06-01 RX ADMIN — TRAMADOL HYDROCHLORIDE 50 MG: 50 TABLET ORAL at 21:30

## 2024-06-01 RX ADMIN — Medication 10 ML: at 20:54

## 2024-06-01 RX ADMIN — HYDRALAZINE HYDROCHLORIDE 25 MG: 25 TABLET ORAL at 15:15

## 2024-06-01 RX ADMIN — SENNOSIDES AND DOCUSATE SODIUM 2 TABLET: 50; 8.6 TABLET ORAL at 08:39

## 2024-06-01 RX ADMIN — LATANOPROST 1 DROP: 50 SOLUTION OPHTHALMIC at 20:53

## 2024-06-01 RX ADMIN — INSULIN LISPRO 2 UNITS: 100 INJECTION, SOLUTION INTRAVENOUS; SUBCUTANEOUS at 13:16

## 2024-06-01 RX ADMIN — BRIMONIDINE TARTRATE 1 DROP: 2 SOLUTION OPHTHALMIC at 08:39

## 2024-06-01 RX ADMIN — DULOXETINE HYDROCHLORIDE 30 MG: 30 CAPSULE, DELAYED RELEASE ORAL at 20:53

## 2024-06-01 RX ADMIN — SODIUM CHLORIDE 75 ML/HR: 9 INJECTION, SOLUTION INTRAVENOUS at 08:31

## 2024-06-01 RX ADMIN — INSULIN LISPRO 2 UNITS: 100 INJECTION, SOLUTION INTRAVENOUS; SUBCUTANEOUS at 18:31

## 2024-06-01 RX ADMIN — OLANZAPINE 5 MG: 5 TABLET, FILM COATED ORAL at 20:53

## 2024-06-01 RX ADMIN — ENOXAPARIN SODIUM 40 MG: 100 INJECTION SUBCUTANEOUS at 08:38

## 2024-06-01 RX ADMIN — ACETAMINOPHEN 325MG 650 MG: 325 TABLET ORAL at 03:30

## 2024-06-01 RX ADMIN — Medication 10 ML: at 08:39

## 2024-06-01 RX ADMIN — INSULIN GLARGINE 24 UNITS: 100 INJECTION, SOLUTION SUBCUTANEOUS at 08:38

## 2024-06-01 NOTE — PROGRESS NOTES
PROGRESS NOTE      Patient Name: Dilip Verma  : 1950  MRN: [id number]  Primary Care Physician: Fernando Camargo DO  Date of admission: 2024    Patient Care Team:  Fernando Camagro DO as PCP - General (Family Medicine)  Morris Cai MD as Consulting Physician (Sleep Medicine)  Michaela Hylton MD as Consulting Physician (Cardiology)  Hardy Banerjee MD as Consulting Physician (Ophthalmology)  Chula Christianson MD as Consulting Physician (Ophthalmology)  Jason Sherman MD (Endocrinology)  Perla Mayen MD as Consulting Physician (Nephrology)  Federico Hebert MD as Consulting Physician (Pulmonary Disease)  Niraj Ravi MD as Consulting Physician (Orthopedic Surgery)  Papa Velasco MD as Consulting Physician (Urology)  Terese Yost MD as Consulting Physician (Gastroenterology)  Aracelis Ball MD as Consulting Physician (Colon and Rectal Surgery)  [facility] as Primary Care Provider        Reason for Follow up:     AMENA, CKD III      Subjective:     Seen and examined, no distress  sCr 1.22, only 300cc UOP charted    Review of systems:  Constitutional: Confusion, weakness.  HEENT:  No headache, otalgia, itchy eyes, nasal discharge or sore throat.  Cardiac:  No chest pain, dyspnea, orthopnea or PND.  Chest:              No cough, phlegm or wheezing.  Abdomen:  No abdominal pain, nausea or vomiting.  Neuro:  No focal weakness, abnormal movements orseizure like activity.  :   Dysuria  ROS was otherwise negative except as mentioned in the [place].       Personal History:     Past Medical History:   Past Medical History:   Diagnosis Date    AMENA (acute kidney injury) 2020    Alcoholism 1989    not since     CORI positive     Anemia     Anxiety     Ataxia     Atypical chest pain 2022    SEEN AT [facility] ER    Balance disorder     Carotid stenosis 3/28/2024    Cataract     BILATERAL, S/P EXTRACTION    Cervical radiculopathy 2019    SEEN AT   "[facility] ER    Cervical spinal cord compression     Chronic diastolic (congestive) heart failure     Chronic kidney disease     STAGE 3, FOLLOWED BY DR. VIVAR    Chronic pancreatitis     Closed left subtrochanteric femur fracture 12/01/2020    ADMITTED TO [facility]    Closed nondisplaced intertrochanteric fracture of left femur 12/01/2020    ADMITTED TO [facility]    Colon polyps     FOLLOWED BY DR. AVTAR JEONG    Constipation     Contracture, right hand     Coronary artery disease     CABG 7/2019    COVID-19 07/2022    DDD (degenerative disc disease), cervical     Diabetes mellitus, type II     IDDM    Diabetic retinopathy     Difficulty walking     Dysphagia     Elevated brain natriuretic peptide (BNP) level 08/2014    Elevated LFTs 03/2021    Erectile dysfunction     Fissure, anal 2022    Foot drop     Fuchs' corneal dystrophy of right eye     Glaucoma     BILATERAL    History of alcohol abuse     Hyperlipidemia     Hypersomnia     Hypertension     Hypertensive urgency 02/25/2020    ADMITTED TO [facility]    Insomnia     Kidney stones     LV dysfunction 06/2016    Lyme disease     Lymphadenopathy syndrome 05/2021    Macular edema     BILATERAL    Myocardial infarction 11/05/2019    NSTEMI, ADMITTED TO [facility]    Myocardial infarction 07/12/2019    NSTEMI, ADMITTED TO [facility]    Neuropathy in diabetes     Non-celiac gluten sensitivity     Orthostasis     Osteoporosis     Oxygen dependent     PAD (peripheral artery disease)     PNA (pneumonia) 06/2016    LEFT LOBE    Polyneuropathy     PTSD (post-traumatic stress disorder)     Pulmonary hypertension     Pulmonary nodule     Senile ectropion of both lower eyelids 02/2022    Sepsis 12/16/2020    D/T UTI, ADMITTED TO [facility]    Sleep apnea     STATES DOESN'T USE BIPAP OR CPAP    Spinal stenosis in cervical region     SEVERE-LIMITED ROM    Stroke 2012    \"slight stroke\"    Syncope and collapse 07/06/2019    ADMITTED TO [facility]    Urinary retention 04/05/2021    SEEN AT [facility] ER    Vision loss     Vitamin D " deficiency        Surgical History:      Past Surgical History:   Procedure Laterality Date    CARDIAC CATHETERIZATION N/A 07/15/2019    Procedure: Coronary angiography;  Surgeon: Carrie Price MD;  Location:  [facility];  Service: Cardiovascular    CARDIAC CATHETERIZATION N/A 07/15/2019    Procedure: Left Heart Cath;  Surgeon: Carrie Price MD;  Location:  [facility];  Service: Cardiovascular    CARDIAC CATHETERIZATION N/A 07/15/2019    Procedure: Left ventriculography;  Surgeon: Carrie Price MD;  Location:  [facility];  Service: Cardiovascular    CARDIAC CATHETERIZATION  07/15/2019    Procedure: Functional Flow [place];  Surgeon: Carrie Price MD;  Location:  [facility];  Service: Cardiovascular    CARDIAC CATHETERIZATION N/A 11/06/2019    Procedure: Right and Left Heart Cath;  Surgeon: Mya Smith MD;  Location:  [facility];  Service: Cardiovascular    CARDIAC CATHETERIZATION N/A 11/06/2019    Procedure: Coronary angiography;  Surgeon: Mya Smith MD;  [facility];  Service: Cardiovascular    CARDIAC SURGERY      CATARACT EXTRACTION Left 2014    CATARACT EXTRACTION Right 11/2016    PHACO/IOL, DR. LEN DENTON    COLONOSCOPY N/A 03/16/2023    ENTIRE COLON WNL, RESCOPE IN 5 YRS, DR. LINDA LIZARRAGA AT [facility]    COLONOSCOPY W/ POLYPECTOMY N/A 01/02/2015    A FEW DIVERTICULA IN SIGMOID, 6 MM TUBULOVILLOUS ADENOMA POLYP IN RECTUM, SMALL HEMORRHOIDS, MELANOSIS COLI, DR. AVTAR JEONG AT [facility]    CORONARY ARTERY BYPASS GRAFT N/A 07/18/2019    Procedure: INTRAOPERATIVE SHOAIB; STERNOTOMY CORONARY ARTERY BYPASS x 3  USING LEFT INTERNAL MAMMARY ARTERY GRAFT UTILIZING ENDOSCOPICALLY HARVESTED RIGHT GREATER SAPHENOUS VEIN AND PRP.;  Surgeon: Bill Devi MD;  Location: [facility] MAIN OR;  Service: Cardiothoracic    CYSTOSCOPY BLADDER STONE LITHOTRIPSY N/A     DENTAL PROCEDURE  Bilateral     3 surgeries inder implants    FEMUR IM NAILING/RODDING Left 12/03/2020    Procedure: LEFT HIP INTRAMEDULLARY NAIL;  Surgeon: Niraj Ravi MD;  Location: [facility] OR;  Service: Orthopedic Spine;  Laterality: Left;    INCISION AND DRAINAGE PERIRECTAL ABSCESS N/A 10/04/2023    Procedure: Incision and drainage of perianal and buttock abscess;  Surgeon: Herbie Villatoro MD;  Location: [facility] OR;  Service: General;  Laterality: N/A;    INGUINAL HERNIA REPAIR Bilateral     TOENAIL EXCISION  06/2022    TONSILLECTOMY Bilateral     TOTAL HIP ARTHROPLASTY REVISION Left 01/11/2022    Procedure: TOTAL HIP ARTHROPLASTY REVISION- POSTERIOR;  Surgeon: Jurgen Candelaria II, MD;  Location: [facility] OR;  Service: Orthopedics;  Laterality: Left;    VASECTOMY N/A        Family History: family history includes Alcohol abuse in his brother and paternal grandfather; Arrhythmia in his mother; Cancer in his mother; Depression in his mother and sister; Glaucoma in his maternal grandmother; Heart attack in his paternal grandmother; Heart disease in his father and mother; Heart failure in his father; Hyperlipidemia in his father and mother; Hypertension in his father and mother; Kidney disease in his father; Lung disease in his paternal uncle; Mental illness in his mother; Migraines in his mother; Stroke in his maternal grandmother and paternal grandmother; Thyroid disease in his father and paternal uncle; Uterine cancer in his mother. Otherwise pertinent FHx was reviewed and unremarkable.     Social History:  reports that he quit smoking about 24 years ago. His smoking use included cigarettes. He started smoking about 40 years ago. He has a 12 pack-year smoking history. He has been exposed to tobacco smoke. He has never used smokeless tobacco. He reports that he does not currently use alcohol. He reports that he does not use drugs.    Medications:  Prior to Admission medications    Medication Sig Start  Date End Date Taking? Authorizing Provider   aspirin 81 MG EC tablet Take 1 tablet by mouth Every Night.   Yes Heri Rinaldi MD   brimonidine (ALPHAGAN) 0.2 % ophthalmic solution Administer 1 drop to both eyes 2 (Two) Times a Day.   Yes Heri Rinaldi MD   bumetanide (BUMEX) 1 MG tablet Take 1 tablet by mouth Daily.   Yes Heri Rinaldi MD   Cholecalciferol (Vitamin D-3) 125 MCG (5000 UT) tablet Take 1 tablet by mouth Daily.   Yes Heri Rinaldi MD   DULoxetine (CYMBALTA) 30 MG capsule Take 1 capsule by mouth Every Night. 1/16/24  Yes Heri Rinaldi MD   Insulin Glargine, 2 Unit Dial, (TOUJEO) 300 UNIT/ML solution pen-injector injection Inject 24 Units under the skin into the appropriate area as directed Daily.   Yes Heri Rinaldi MD   insulin lispro (humaLOG) 100 UNIT/ML injection Inject 0-14 Units under the skin into the appropriate area as directed 3 (Three) Times a Day Before Meals.  Patient taking differently: Inject 0-14 Units under the skin into the appropriate area as directed 3 (Three) Times a Day Before Meals. SLIDING SCALE  100-150 - 4 units  151-200 - 5 units  201-250 - 6 units  251-300 - 7 units  301-350 - 8 units  351-400 - 9 units  401+ - 10 units 12/7/20  Yes Amador Valverde MD   latanoprost (XALATAN) 0.005 % ophthalmic solution Administer 1 drop to both eyes every night at bedtime. 1/16/23  Yes Heri Rinaldi MD   multivitamin with minerals (MULTIVITAMIN ADULTS PO) Take 1 tablet by mouth Daily.   Yes Heri Rinaldi MD   testosterone (ANDROGEL) 25 MG/2.5GM (1%) gel gel Place 25 mg on the skin as directed by provider 2 (Two) Times a Week.   Yes Heri Rinaldi MD   traMADol (ULTRAM) 50 MG tablet Take 1 tablet by mouth At Night As Needed. 10/16/23  Yes Heri Rinaldi MD   Magnesium 400 MG capsule Take 400 mg by mouth As Needed.    Heri Rinaldi MD   sildenafil (REVATIO) 20 MG tablet Take 1 tablet by mouth As Needed.     Provider, MD Heri     Scheduled Meds:aspirin, 81 mg, Oral, Daily  brimonidine, 1 drop, Both Eyes, BID  cefTRIAXone, 2,000 mg, Intravenous, Q24H  DULoxetine, 30 mg, Oral, Daily  enoxaparin, 40 mg, Subcutaneous, Daily  insulin glargine, 24 Units, Subcutaneous, Daily  insulin lispro, 2-9 Units, Subcutaneous, 4x Daily AC & at Bedtime  latanoprost, 1 drop, Both Eyes, Nightly  OLANZapine, 5 mg, Oral, Nightly  senna-docusate sodium, 2 tablet, Oral, BID  sodium chloride, 10 mL, Intravenous, Q12H  terazosin, 1 mg, Oral, Nightly      Continuous Infusions:sodium chloride, 75 mL/hr, Last Rate: 75 mL/hr (06/01/24 0831)      PRN Meds:  acetaminophen **OR** acetaminophen **OR** acetaminophen    senna-docusate sodium **AND** polyethylene glycol **AND** bisacodyl **AND** bisacodyl    dextrose    dextrose    glucagon (human recombinant)    nitroglycerin    OLANZapine    ondansetron    sodium chloride    sodium chloride  Allergies:    Allergies   Allergen Reactions    Ace Inhibitors Angioedema    Angiotensin Receptor Blockers Angioedema and Unknown (See Comments)     Angioneurotic edema    Dapagliflozin Other (See Comments)     UTI,  rectal abcess    Losartan Angioedema     Angioneurotic edema    Seroquel [Quetiapine] Hallucinations    Xanax [Alprazolam] Unknown - High Severity    Amlodipine Swelling    Ativan [Lorazepam] Hallucinations    Baclofen Hallucinations    Misc. Sulfonamide Containing Compounds Unknown (See Comments)    Minoxidil Confusion    Tetracycline Rash     bliisters in mouth         Objective   Exam:     Vital Signs  Temp:  [97.3 °F (36.3 °C)-98.6 °F (37 °C)] 98.4 °F (36.9 °C)  Heart Rate:  [88-92] 92  Resp:  [16-18] 16  BP: (118-151)/(61-93) 149/93  SpO2:  [87 %-93 %] 92 %  on  Flow (L/min):  [3.5] 3.5;   Device (Oxygen Therapy): nasal cannula  Body mass index is 31.14 kg/m².  EXAM  General:  Chronically ill appearing male in no acute distress.    Head:      Normocephalic and atraumatic.    Eyes:       PERRL/EOM intact, conjunctivae and sclerae clear without nystagmus.    Neck:      No masses, thyromegaly,  trachea central   Lungs:    Clear bilaterally to auscultation.    Heart:      Regular rate and rhythm, no murmur no gallop  Abd:        Soft, nontender, not distended, bowel sounds positive, no shifting dullness.  Msk:        No deformity or scoliosis noted of thoracic or lumbar spine.    Pulses:   Pulses normal in all 4 extremities.    Extremities:        No cyanosis or clubbing- no edema.    Neuro:    No focal deficits.   alert oriented x3  Skin:       Intact without lesions or rashes.    Psych:    Alert and cooperative; normal mood and affect; normal attention span       Results Review:  I have personally reviewed most recent Data :  [facility]  CBC    Results from last 7 days   Lab Units 06/01/24  0551 05/31/24  0620 05/30/24  0433   WBC 10*3/mm3 8.26 14.38* 30.36*   HEMOGLOBIN g/dL 13.2 13.4 16.4   PLATELETS 10*3/mm3 168 184 230     CMP   Results from last 7 days   Lab Units 06/01/24  0551 05/31/24  0620 05/30/24  0955 05/30/24  0433   SODIUM mmol/L 138 137 140 138   POTASSIUM mmol/L 4.5 4.4 4.0 5.5*   CHLORIDE mmol/L 106 102 107 98   CO2 mmol/L 23.4 27.0 24.6 29.2*   BUN mg/dL 31* 32* 22 24*   CREATININE mg/dL 1.22 1.51* 0.90 1.17   GLUCOSE mg/dL 166* 97 228* 306*   ALBUMIN g/dL  --  3.3*  --  4.2   BILIRUBIN mg/dL  --  0.6  --  1.3*   ALK PHOS U/L  --  60  --  86   AST (SGOT) U/L  --  30  --  33   ALT (SGPT) U/L  --  18  --  31   AMMONIA umol/L  --   --   --  20     ABG    Results from last 7 days   Lab Units 05/30/24  0505   PH, ARTERIAL pH units 7.420   PCO2, ARTERIAL mm Hg 44.0   PO2 ART mm Hg 56.9*   O2 SATURATION ART % 89.5*   BASE EXCESS ART mmol/L 3.3*     US Renal Bilateral    Result Date: 5/31/2024  Thickened urinary bladder wall. Low volume bladder. No hydronephrosis.  This report was finalized on 5/31/2024 6:25 PM by Dr. Zechariah Falcon M.D on Workstation: [id number]      CT  Abdomen Pelvis With Contrast    Result Date: 5/30/2024   1. Diffuse urinary bladder wall thickening and mild perivesical fat stranding, which could be accentuated by under distention but could reflect a nonspecific cystitis but recommend correlating with urinalysis. 2. Low lung volumes with multifocal bilateral perihilar and bibasilar opacities that likely represent subsegmental atelectasis and/or scarring. 3. Likely benign 9 mm subpleural nodule in the base of the lingula and likely reactive mediastinal and bilateral hilar lymphadenopathy given the long-term stability. 4. Nonspecific fat stranding in the left inguinal region. Correlate for recent trauma or instrumentation. 5. Chronic appearing splenic vein thrombosis with peripheral calcification and multiple venous collaterals in the left upper quadrant. Additional pancreatic/peripancreatic calcifications could reflect sequela of chronic pancreatitis. 6. Mildly heterogeneous hepatic attenuation with subtle nodular liver contours, which could reflect underlying hepatocellular disease/cirrhosis. 7. Tiny hiatal hernia with mild nonspecific thickening of the distal esophageal wall and mild gastric wall thickening that could be accentuated by under distention. Correlate for GERD and possible gastritis.  This report was finalized on 5/30/2024 7:35 AM by Julio Thomas MD on Workstation: [id number]      CT Chest With Contrast Diagnostic    Result Date: 5/30/2024   1. Diffuse urinary bladder wall thickening and mild perivesical fat stranding, which could be accentuated by under distention but could reflect a nonspecific cystitis but recommend correlating with urinalysis. 2. Low lung volumes with multifocal bilateral perihilar and bibasilar opacities that likely represent subsegmental atelectasis and/or scarring. 3. Likely benign 9 mm subpleural nodule in the base of the lingula and likely reactive mediastinal and bilateral hilar lymphadenopathy given the long-term  stability. 4. Nonspecific fat stranding in the left inguinal region. Correlate for recent trauma or instrumentation. 5. Chronic appearing splenic vein thrombosis with peripheral calcification and multiple venous collaterals in the left upper quadrant. Additional pancreatic/peripancreatic calcifications could reflect sequela of chronic pancreatitis. 6. Mildly heterogeneous hepatic attenuation with subtle nodular liver contours, which could reflect underlying hepatocellular disease/cirrhosis. 7. Tiny hiatal hernia with mild nonspecific thickening of the distal esophageal wall and mild gastric wall thickening that could be accentuated by under distention. Correlate for GERD and possible gastritis.  This report was finalized on 5/30/2024 7:35 AM by Julio Thomas MD on Workstation: [id number]      CT Head Without Contrast    Result Date: 5/30/2024   No acute process identified.    Radiation dose reduction techniques were utilized, including automated exposure control and exposure modulation based on body size.       XR Chest 1 View    Result Date: 5/30/2024  Nonspecific bibasilar consolidation.  This report was finalized on 5/30/2024 4:47 AM by Dr. Gayle Okeefe M.D on Workstation: [id number]       Results for orders placed during the hospital encounter of 10/05/22    Adult Transthoracic Echo Complete W/ Cont if Necessary Per Protocol    Interpretation Summary  · Calculated left ventricular EF = 66.1% Estimated left ventricular EF was in agreement with the calculated left ventricular EF. Left ventricular systolic function is normal.Left ventricular wall thickness is consistent with mild concentric hypertrophy.Abnormal global longitudinal LV strain (GLS) = -12.3%.  · Left ventricular diastolic function is consistent with (grade II w/high LAP) pseudonormalization.Elevated left atrial pressure.  · The aortic valve exhibits sclerosis. There is restricted mobility of the right coronary cusp. There is no significant  stenosis or regurgitation noted.        Assessment & Plan   Assessment and Plan:         Acute UTI (urinary tract infection)    Type 2 diabetes mellitus with hyperglycemia, with long-term current use of insulin    CAD (coronary artery disease)    Hx of CABG    Chronic diastolic heart failure    CSA (central sleep apnea)    HTN (hypertension)    History of stroke    CKD (chronic kidney disease) stage 3, GFR 30-59 ml/min    Peripheral arterial disease    Sepsis    Hyperkalemia    Metabolic encephalopathy    AMENA (acute kidney injury)    ASSESSMENT:  AMENA likely prerenal ATN 2/2 urosepsis with hemodynamic changes; on CKD III etiology likely diabetic and hypertensive nephropathy with baseline sCr ~0.9-1.2 mg/dL  Hyperkalemia, now resolved  UTI, urine cultures with gram neg bacilli  Leukocytosis  Metabolic encephalopathy  PAD  Hx stroke  HTN  CHF  CAD  DM2  Chronic splenic vein thrombosis  Possible hepatocellular disease/cirrhosis  Likely benign subpleural nodule with bilateral lymphadenopathy  Hiatal hernia    Last TTE 10/5/22 with EF 66%, grade II DD    PLAN :     AMENA likely prerenal ATN 2/2 urosepsis with hemodynamic changes; on CKD III etiology likely diabetic and hypertensive nephropathy with baseline sCr ~0.9-1.2 mg/dL  Renal function now back to baseline  Home bumex on hold for now, agree  Continue IVF NS @75cc/hr for now  Volume with CXR with bibasilar consolidation, on NC4L, LE edema, will likely need to restart bumex once renal function improves  Hyperkalemia resolved with IV insulin  Electrolytes, acid/base acceptable  UTI on empiric antibiotics per primary, urine cultures with gram neg bacilli  BC NGTD  BP trends acceptable  Strict I&O's  Avoid NSAIDs, nephrotoxic agents  We will follow and coordinate with team      Note transcribed for Dr Saez    I have , personally performed the services described in this documentation as scribed by the above named individual in my presence, and it is both accurate and  complete.  6/1/2024  11:53 EDT      [facility]  6/1/2024  11:53 EDT    Overall stable renal function AMENA resolved  Stop IVFs and encourage PO intake  Continue treatment of infection with Abx

## 2024-06-01 NOTE — PLAN OF CARE
Problem: Adult Inpatient Plan of Care  Goal: Absence of Hospital-Acquired Illness or Injury  Outcome: Ongoing, Progressing  Intervention: Identify and Manage Fall Risk  Recent Flowsheet Documentation  Taken 6/1/2024 0400 by Julio Cesar Ashby RN  Safety Promotion/Fall Prevention:   safety round/check completed   room organization consistent   nonskid shoes/slippers when out of bed   lighting adjusted   fall prevention program maintained   clutter free environment maintained   assistive device/personal items within reach  Taken 6/1/2024 0330 by Julio Cesar Ashby RN  Safety Promotion/Fall Prevention:   safety round/check completed   room organization consistent   nonskid shoes/slippers when out of bed   lighting adjusted   fall prevention program maintained   clutter free environment maintained   assistive device/personal items within reach  Taken 6/1/2024 0200 by Julio Cesar Ashby RN  Safety Promotion/Fall Prevention:   safety round/check completed   room organization consistent   nonskid shoes/slippers when out of bed   lighting adjusted   fall prevention program maintained   clutter free environment maintained   assistive device/personal items within reach  Taken 6/1/2024 0000 by Julio Cesar Ashby RN  Safety Promotion/Fall Prevention:   safety round/check completed   room organization consistent   nonskid shoes/slippers when out of bed   lighting adjusted   fall prevention program maintained   assistive device/personal items within reach   clutter free environment maintained  Taken 5/31/2024 2200 by Julio Cesar Ashby RN  Safety Promotion/Fall Prevention:   safety round/check completed   room organization consistent   nonskid shoes/slippers when out of bed   lighting adjusted   fall prevention program maintained   clutter free environment maintained   assistive device/personal items within reach  Taken 5/31/2024 2028 by Julio Cesar Ashby RN  Safety Promotion/Fall  Prevention:   safety round/check completed   room organization consistent   nonskid shoes/slippers when out of bed   lighting adjusted   fall prevention program maintained   clutter free environment maintained   assistive device/personal items within reach  Intervention: Prevent Skin Injury  Recent Flowsheet Documentation  Taken 6/1/2024 0400 by Julio Cesar Ashby RN  Body Position: position changed independently  Taken 6/1/2024 0330 by Julio Cesar Ashby RN  Body Position: position changed independently  Taken 6/1/2024 0200 by Julio Cesar Ashby RN  Body Position: position changed independently  Taken 6/1/2024 0000 by Julio Cesar Ashby RN  Body Position: position changed independently  Taken 5/31/2024 2200 by Julio Cesar Ashby RN  Body Position: position changed independently  Taken 5/31/2024 2028 by Julio Cesar Ashby RN  Body Position: position changed independently  Skin Protection:   adhesive use limited   incontinence pads utilized   tubing/devices free from skin contact  Intervention: Prevent and Manage VTE (Venous Thromboembolism) Risk  Recent Flowsheet Documentation  Taken 6/1/2024 0400 by Julio Cesar Ashby RN  Activity Management: activity minimized  Taken 6/1/2024 0330 by Julio Cesar Ashby RN  Activity Management: activity minimized  Taken 6/1/2024 0200 by Julio Cesar Ashby RN  Activity Management: activity minimized  Taken 6/1/2024 0000 by Julio Cesar Ashby RN  Activity Management: activity minimized  Taken 5/31/2024 2200 by Julio Cesar Ashby RN  Activity Management: activity minimized  Taken 5/31/2024 2028 by Julio Cesar Ashby RN  Activity Management: activity encouraged  VTE Prevention/Management: (Lovenox) other (see comments)  Range of Motion: active ROM (range of motion) encouraged  Intervention: Prevent Infection  Recent Flowsheet Documentation  Taken 6/1/2024 0400 by Julio Cesar Ashby RN  Infection Prevention:    rest/sleep promoted   personal protective equipment utilized   hand hygiene promoted   environmental surveillance performed  Taken 6/1/2024 0330 by Julio Cesar Ashby RN  Infection Prevention:   rest/sleep promoted   personal protective equipment utilized   hand hygiene promoted   environmental surveillance performed  Taken 6/1/2024 0200 by Julio Cesar Ashby RN  Infection Prevention:   rest/sleep promoted   personal protective equipment utilized   hand hygiene promoted   environmental surveillance performed  Taken 6/1/2024 0000 by Julio Cesar Ashby RN  Infection Prevention:   rest/sleep promoted   personal protective equipment utilized   hand hygiene promoted   environmental surveillance performed  Taken 5/31/2024 2200 by Julio Cesar Ashyb RN  Infection Prevention:   rest/sleep promoted   personal protective equipment utilized   hand hygiene promoted   environmental surveillance performed  Taken 5/31/2024 2028 by Julio Cesar Ashby RN  Infection Prevention:   rest/sleep promoted   personal protective equipment utilized   hand hygiene promoted   environmental surveillance performed   Goal Outcome Evaluation:  Plan of Care Reviewed With: patient        Progress: no change  Outcome Evaluation: AOx3 confused on situation and sometimes forgetful, VSS, O2 at 3.5 lpm via NC, assist x2 to bedside commode, PRN pain medication given patient complained of shoulder pain, q2 turned and heels elevated in heel boots, had episodes of urine incontinence -male wrap and brief changed, wife at bedside, bed alarm on, precautions maintained, bed in lowest position, call ligth within reach

## 2024-06-01 NOTE — PROGRESS NOTES
[facility]    [phone]   LOS: 2 days     Name: Dilip Verma  Age/Sex: 74 y.o. male  :  1950        PCP: Fernando Camargo DO  Chief Complaint   Patient presents with    Altered Mental Status      Subjective   He is still very sleepy and fatigued this AM he will wake up but falls right back asleep.  Per his wife they didn't sleep much last night.    General: No Fever or Chills, Cardiac: No Chest Pain or Palpitations, Resp: No Cough or SOA, GI: No Nausea, Vomiting, or Diarrhea, and Other: No bleeding    aspirin, 81 mg, Oral, Daily  brimonidine, 1 drop, Both Eyes, BID  cefTRIAXone, 2,000 mg, Intravenous, Q24H  DULoxetine, 30 mg, Oral, Daily  enoxaparin, 40 mg, Subcutaneous, Daily  insulin glargine, 24 Units, Subcutaneous, Daily  insulin lispro, 2-9 Units, Subcutaneous, 4x Daily AC & at Bedtime  latanoprost, 1 drop, Both Eyes, Nightly  OLANZapine, 5 mg, Oral, Nightly  senna-docusate sodium, 2 tablet, Oral, BID  sodium chloride, 10 mL, Intravenous, Q12H  terazosin, 1 mg, Oral, Nightly      sodium chloride, 75 mL/hr, Last Rate: 75 mL/hr (24 0831)        Objective   Vital Signs  Temp:  [97.3 °F (36.3 °C)-98.6 °F (37 °C)] 98.4 °F (36.9 °C)  Heart Rate:  [88-92] 92  Resp:  [16-18] 16  BP: (118-151)/(61-93) 149/93  Body mass index is 31.14 kg/m².    Intake/Output Summary (Last 24 hours) at 2024 1211  Last data filed at 2024 0800  Gross per 24 hour   Intake 440 ml   Output 300 ml   Net 140 ml       Physical Exam  Constitutional:       General: He is not in acute distress.     Appearance: He is obese. He is ill-appearing.   Cardiovascular:      Rate and Rhythm: Normal rate and regular rhythm.   Pulmonary:      Effort: No respiratory distress.      Breath sounds: Normal breath sounds.   Abdominal:      General: Bowel sounds are normal.      Palpations: Abdomen is soft.   Neurological:      Mental Status: Mental status is at baseline.           Results Review:       I reviewed the  patient's new clinical results.  Results from last 7 days   Lab Units 06/01/24  0551 05/31/24  0620 05/30/24  0433   WBC 10*3/mm3 8.26 14.38* 30.36*   HEMOGLOBIN g/dL 13.2 13.4 16.4   PLATELETS 10*3/mm3 168 184 230     Results from last 7 days   Lab Units 06/01/24  0551 05/31/24  0620 05/30/24  0955 05/30/24  0433   SODIUM mmol/L 138 137 140 138   POTASSIUM mmol/L 4.5 4.4 4.0 5.5*   CHLORIDE mmol/L 106 102 107 98   CO2 mmol/L 23.4 27.0 24.6 29.2*   BUN mg/dL 31* 32* 22 24*   CREATININE mg/dL 1.22 1.51* 0.90 1.17   CALCIUM mg/dL 8.3* 8.8 7.3* 9.6   MAGNESIUM mg/dL  --   --   --  1.9   Estimated Creatinine Clearance: 57.9 mL/min (by C-G formula based on SCr of 1.22 mg/dL).      Assessment & Plan   Active Hospital Problems    Diagnosis  POA    **Acute UTI (urinary tract infection) [N39.0]  Yes    AMENA (acute kidney injury) [N17.9]  Unknown    Sepsis [A41.9]  Yes    Hyperkalemia [E87.5]  Unknown    Metabolic encephalopathy [G93.41]  Unknown    Peripheral arterial disease [I73.9]  Yes    History of stroke [Z86.73]  Not Applicable    CKD (chronic kidney disease) stage 3, GFR 30-59 ml/min [N18.30]  Yes    HTN (hypertension) [I10]  Yes    CSA (central sleep apnea) [G47.31]  Yes    Chronic diastolic heart failure [I50.32]  Yes    Hx of CABG [Z95.1]  Not Applicable    CAD (coronary artery disease) [I25.10]  Yes    Type 2 diabetes mellitus with hyperglycemia, with long-term current use of insulin [E11.65, Z79.4]  Not Applicable      Resolved Hospital Problems   No resolved problems to display.       PLAN  This is a 74-year-old gentleman with a history of chronic kidney disease hypertension coronary artery disease type 2 diabetes peripheral artery disease who presents to the hospital with metabolic encephalopathy and is found to have sepsis and acute renal failure secondary to underlying urinary tract infection  -Urine cultures growing gram-negative rods although it is not a great colony count at  50,000 CFU.  -Continue IV  antibiotics.  PSA was within normal limits no evidence of prostatitis.  He does have some urinary retention which is also contributing to his acute renal failure.  -Continue to cath as needed.  Encourage out of bed activity as this will help with urinary retention.  Hytrin started.  -Baseline creatinine around 1.1 up to 1.5 trending down.  Nephrology following  -Mechanical DVT prophylaxis  -Full code      Disposition  Expected Discharge Date: 6/4/2024; Expected Discharge Time:        Que Gregg MD  [facility]  06/01/24  12:11 EDT

## 2024-06-01 NOTE — PLAN OF CARE
Problem: Adult Inpatient Plan of Care  Goal: Plan of Care Review  Flowsheets (Taken 6/1/2024 1710)  Outcome Evaluation: AOX2-3. Stil on 3.5 L nasal cannula, SpO2 drops when attempting to decrease any further. Doppler lower extremities completed. Incontinent of bladder, brief and male wraps. Elevated BP, 1x hydralazine administered. Up in chair, max assist x2 with walker. Chair alarm on, call light within reach.   Goal Outcome Evaluation:              Outcome Evaluation: AOX2-3. Stil on 3.5 L nasal cannula, SpO2 drops when attempting to decrease any further. Doppler lower extremities completed. Incontinent of bladder, brief and male wraps. Elevated BP, 1x hydralazine administered. Up in chair, max assist x2 with walker. Chair alarm on, call light within reach.

## 2024-06-02 LAB
ALBUMIN SERPL-MCNC: 3.2 G/DL (ref 3.5–5.2)
ANION GAP SERPL CALCULATED.3IONS-SCNC: 10 MMOL/L (ref 5–15)
BACTERIA SPEC AEROBE CULT: ABNORMAL
BASOPHILS # BLD AUTO: 0.02 10*3/MM3 (ref 0–0.2)
BASOPHILS NFR BLD AUTO: 0.3 % (ref 0–1.5)
BUN SERPL-MCNC: 23 MG/DL (ref 8–23)
BUN/CREAT SERPL: 24.5 (ref 7–25)
CALCIUM SPEC-SCNC: 8.5 MG/DL (ref 8.6–10.5)
CHLORIDE SERPL-SCNC: 107 MMOL/L (ref 98–107)
CO2 SERPL-SCNC: 24 MMOL/L (ref 22–29)
CREAT SERPL-MCNC: 0.94 MG/DL (ref 0.76–1.27)
DEPRECATED RDW RBC AUTO: 42.8 FL (ref 37–54)
EGFRCR SERPLBLD CKD-EPI 2021: 84.5 ML/MIN/1.73
EOSINOPHIL # BLD AUTO: 0.19 10*3/MM3 (ref 0–0.4)
EOSINOPHIL NFR BLD AUTO: 2.4 % (ref 0.3–6.2)
ERYTHROCYTE [DISTWIDTH] IN BLOOD BY AUTOMATED COUNT: 12.6 % (ref 12.3–15.4)
GLUCOSE BLDC GLUCOMTR-MCNC: 129 MG/DL (ref 70–130)
GLUCOSE BLDC GLUCOMTR-MCNC: 129 MG/DL (ref 70–130)
GLUCOSE BLDC GLUCOMTR-MCNC: 130 MG/DL (ref 70–130)
GLUCOSE BLDC GLUCOMTR-MCNC: 84 MG/DL (ref 70–130)
GLUCOSE SERPL-MCNC: 74 MG/DL (ref 65–99)
HCT VFR BLD AUTO: 39 % (ref 37.5–51)
HGB BLD-MCNC: 13 G/DL (ref 13–17.7)
IMM GRANULOCYTES # BLD AUTO: 0.02 10*3/MM3 (ref 0–0.05)
IMM GRANULOCYTES NFR BLD AUTO: 0.3 % (ref 0–0.5)
LYMPHOCYTES # BLD AUTO: 0.9 10*3/MM3 (ref 0.7–3.1)
LYMPHOCYTES NFR BLD AUTO: 11.3 % (ref 19.6–45.3)
MAGNESIUM SERPL-MCNC: 2.1 MG/DL (ref 1.6–2.4)
MCH RBC QN AUTO: 31 PG (ref 26.6–33)
MCHC RBC AUTO-ENTMCNC: 33.3 G/DL (ref 31.5–35.7)
MCV RBC AUTO: 92.9 FL (ref 79–97)
MONOCYTES # BLD AUTO: 0.73 10*3/MM3 (ref 0.1–0.9)
MONOCYTES NFR BLD AUTO: 9.2 % (ref 5–12)
NEUTROPHILS NFR BLD AUTO: 6.08 10*3/MM3 (ref 1.7–7)
NEUTROPHILS NFR BLD AUTO: 76.5 % (ref 42.7–76)
NRBC BLD AUTO-RTO: 0 /100 WBC (ref 0–0.2)
PHOSPHATE SERPL-MCNC: 2.4 MG/DL (ref 2.5–4.5)
PLATELET # BLD AUTO: 204 10*3/MM3 (ref 140–450)
PMV BLD AUTO: 10.8 FL (ref 6–12)
POTASSIUM SERPL-SCNC: 4.2 MMOL/L (ref 3.5–5.2)
RBC # BLD AUTO: 4.2 10*6/MM3 (ref 4.14–5.8)
SODIUM SERPL-SCNC: 141 MMOL/L (ref 136–145)
WBC NRBC COR # BLD AUTO: 7.94 10*3/MM3 (ref 3.4–10.8)

## 2024-06-02 PROCEDURE — 25010000002 VANCOMYCIN 10 G RECONSTITUTED SOLUTION: Performed by: HOSPITALIST

## 2024-06-02 PROCEDURE — 25810000003 SODIUM CHLORIDE 0.9 % SOLUTION: Performed by: HOSPITALIST

## 2024-06-02 PROCEDURE — 82948 REAGENT STRIP/BLOOD GLUCOSE: CPT

## 2024-06-02 PROCEDURE — 25010000002 PIPERACILLIN SOD-TAZOBACTAM PER 1 G: Performed by: HOSPITALIST

## 2024-06-02 PROCEDURE — 83735 ASSAY OF MAGNESIUM: CPT | Performed by: HOSPITALIST

## 2024-06-02 PROCEDURE — 85025 COMPLETE CBC W/AUTO DIFF WBC: CPT | Performed by: HOSPITALIST

## 2024-06-02 PROCEDURE — 25010000002 ENOXAPARIN PER 10 MG: Performed by: INTERNAL MEDICINE

## 2024-06-02 PROCEDURE — 97110 THERAPEUTIC EXERCISES: CPT

## 2024-06-02 PROCEDURE — 80069 RENAL FUNCTION PANEL: CPT | Performed by: HOSPITALIST

## 2024-06-02 PROCEDURE — 63710000001 INSULIN GLARGINE PER 5 UNITS: Performed by: INTERNAL MEDICINE

## 2024-06-02 RX ORDER — BUMETANIDE 1 MG/1
1 TABLET ORAL DAILY
Status: DISCONTINUED | OUTPATIENT
Start: 2024-06-02 | End: 2024-06-06

## 2024-06-02 RX ORDER — VANCOMYCIN/0.9 % SOD CHLORIDE 1.5G/250ML
1500 PLASTIC BAG, INJECTION (ML) INTRAVENOUS EVERY 24 HOURS
Status: DISCONTINUED | OUTPATIENT
Start: 2024-06-03 | End: 2024-06-05

## 2024-06-02 RX ADMIN — ASPIRIN 81 MG: 81 TABLET, COATED ORAL at 22:14

## 2024-06-02 RX ADMIN — SENNOSIDES AND DOCUSATE SODIUM 2 TABLET: 50; 8.6 TABLET ORAL at 22:14

## 2024-06-02 RX ADMIN — BUMETANIDE 1 MG: 1 TABLET ORAL at 22:14

## 2024-06-02 RX ADMIN — Medication 10 ML: at 09:22

## 2024-06-02 RX ADMIN — Medication 10 ML: at 22:15

## 2024-06-02 RX ADMIN — DULOXETINE HYDROCHLORIDE 30 MG: 30 CAPSULE, DELAYED RELEASE ORAL at 22:15

## 2024-06-02 RX ADMIN — PIPERACILLIN AND TAZOBACTAM 3.38 G: 3; .375 INJECTION, POWDER, FOR SOLUTION INTRAVENOUS at 16:20

## 2024-06-02 RX ADMIN — PIPERACILLIN AND TAZOBACTAM 3.38 G: 3; .375 INJECTION, POWDER, FOR SOLUTION INTRAVENOUS at 09:27

## 2024-06-02 RX ADMIN — ENOXAPARIN SODIUM 40 MG: 100 INJECTION SUBCUTANEOUS at 09:21

## 2024-06-02 RX ADMIN — TERAZOSIN HYDROCHLORIDE 1 MG: 1 CAPSULE ORAL at 22:15

## 2024-06-02 RX ADMIN — INSULIN GLARGINE 20 UNITS: 100 INJECTION, SOLUTION SUBCUTANEOUS at 09:21

## 2024-06-02 RX ADMIN — LATANOPROST 1 DROP: 50 SOLUTION OPHTHALMIC at 22:15

## 2024-06-02 RX ADMIN — VANCOMYCIN HYDROCHLORIDE 1750 MG: 10 INJECTION, POWDER, LYOPHILIZED, FOR SOLUTION INTRAVENOUS at 11:13

## 2024-06-02 RX ADMIN — BRIMONIDINE TARTRATE 1 DROP: 2 SOLUTION OPHTHALMIC at 22:16

## 2024-06-02 RX ADMIN — TRAMADOL HYDROCHLORIDE 50 MG: 50 TABLET ORAL at 22:14

## 2024-06-02 RX ADMIN — SENNOSIDES AND DOCUSATE SODIUM 2 TABLET: 50; 8.6 TABLET ORAL at 09:21

## 2024-06-02 RX ADMIN — OLANZAPINE 5 MG: 5 TABLET, FILM COATED ORAL at 22:15

## 2024-06-02 RX ADMIN — BRIMONIDINE TARTRATE 1 DROP: 2 SOLUTION OPHTHALMIC at 09:21

## 2024-06-02 RX ADMIN — POTASSIUM, SODIUM PHOSPHATES 280 MG-160 MG-250 MG ORAL POWDER PACKET 2 PACKET: POWDER IN PACKET at 16:20

## 2024-06-02 NOTE — PLAN OF CARE
Problem: Adult Inpatient Plan of Care  Goal: Plan of Care Review  Flowsheets (Taken 6/2/2024 0349)  Progress: no change  Plan of Care Reviewed With: patient  Outcome Evaluation: No acute changes. AOx3, disoriented to situation. O2 in use @ 3.5 to 4L via NC. Drops w/ exertion. Incontinent @ times. Assist x2. Makes needs known. Medicated for pain per MAR. Wife @ bedside. Pleasant. Bed locked and in lowest position. Side rails up x2. Call light within reach. Will continue to assess.  Goal: Absence of Hospital-Acquired Illness or Injury  Intervention: Identify and Manage Fall Risk  Flowsheets  Taken 6/2/2024 0239  Safety Promotion/Fall Prevention:   activity supervised   assistive device/personal items within reach   clutter free environment maintained   fall prevention program maintained   lighting adjusted   nonskid shoes/slippers when out of bed   room organization consistent   safety round/check completed  Taken 6/2/2024 0020  Safety Promotion/Fall Prevention:   activity supervised   assistive device/personal items within reach   clutter free environment maintained   fall prevention program maintained   lighting adjusted   nonskid shoes/slippers when out of bed   safety round/check completed   room organization consistent  Taken 6/1/2024 2205  Safety Promotion/Fall Prevention:   activity supervised   assistive device/personal items within reach   clutter free environment maintained   fall prevention program maintained   lighting adjusted   nonskid shoes/slippers when out of bed   room organization consistent   safety round/check completed  Taken 6/1/2024 2006  Safety Promotion/Fall Prevention:   activity supervised   assistive device/personal items within reach   clutter free environment maintained   fall prevention program maintained   lighting adjusted   nonskid shoes/slippers when out of bed   safety round/check completed   room organization consistent  Intervention: Prevent Skin Injury  Flowsheets  Taken  6/2/2024 0239  Body Position: position changed independently  Taken 6/2/2024 0020  Body Position: position changed independently  Skin Protection:   adhesive use limited   incontinence pads utilized   transparent dressing maintained   tubing/devices free from skin contact  Taken 6/1/2024 2205  Body Position: position changed independently  Taken 6/1/2024 2006  Body Position: position changed independently  Skin Protection:   adhesive use limited   incontinence pads utilized   transparent dressing maintained   tubing/devices free from skin contact  Intervention: Prevent and Manage VTE (Venous Thromboembolism) Risk  Flowsheets  Taken 6/2/2024 0239  Activity Management: activity minimized  Taken 6/2/2024 0020  Activity Management: activity minimized  VTE Prevention/Management: (lovenox)   bilateral   sequential compression devices off   patient refused intervention   other (see comments)  Range of Motion:   active ROM (range of motion) encouraged   ROM (range of motion) performed  Taken 6/1/2024 2205  Activity Management:   back to bed   activity encouraged  Taken 6/1/2024 2006  Activity Management: up in chair  VTE Prevention/Management: (takes lovenox)   bilateral   sequential compression devices off   patient refused intervention   other (see comments)  Range of Motion:   active ROM (range of motion) encouraged   ROM (range of motion) performed  Intervention: Prevent Infection  Flowsheets (Taken 6/2/2024 0100 by Juana Mina, PCT)  Infection Prevention:   single patient room provided   rest/sleep promoted   hand hygiene promoted   environmental surveillance performed  Goal: Optimal Comfort and Wellbeing  Intervention: Monitor Pain and Promote Comfort  Flowsheets (Taken 6/1/2024 0330 by Julio Cesar Ashby, RN)  Pain Management Interventions:   see MAR   unnecessary movement minimized   relaxation techniques promoted   position adjusted   pillow support provided  Intervention: Provide Person-Centered  Care  Flowsheets  Taken 6/2/2024 0020  Trust Relationship/Rapport:   care explained   choices provided   emotional support provided   empathic listening provided   questions answered   questions encouraged   reassurance provided   thoughts/feelings acknowledged  Taken 6/1/2024 2006  Trust Relationship/Rapport:   care explained   choices provided   emotional support provided   empathic listening provided   questions answered   questions encouraged   thoughts/feelings acknowledged   reassurance provided  Goal: Readiness for Transition of Care  Intervention: Mutually Develop Transition Plan  Flowsheets (Taken 5/30/2024 1344 by Nabila Ramsey RN)  Equipment Needed After Discharge: none  Equipment Currently Used at Home:   shower chair   rollator   oxygen   ramp   wheelchair   lift device   grab bar   glucometer   commode   bipap  Anticipated Changes Related to Illness: inability to care for self  Transportation Anticipated: family or friend will provide  Concerns to be Addressed: adjustment to diagnosis/illness  Readmission Within the Last 30 Days: no previous admission in last 30 days  Patient/Family Anticipated Services at Transition:   skilled nursing   home health care  Patient/Family Anticipates Transition to:   home with family   home with help/services   inpatient rehabilitation facility     Problem: Diabetes Comorbidity  Goal: Blood Glucose Level Within Targeted Range  Intervention: Monitor and Manage Glycemia  Flowsheets  Taken 6/2/2024 0020  Glycemic Management: blood glucose monitored  Taken 6/1/2024 2006  Glycemic Management: blood glucose monitored     Problem: Heart Failure Comorbidity  Goal: Maintenance of Heart Failure Symptom Control  Intervention: Maintain Heart Failure-Management  Flowsheets  Taken 6/1/2024 2205  Medication Review/Management: medications reviewed  Taken 6/1/2024 2006  Medication Review/Management: medications reviewed     Problem: Hypertension Comorbidity  Goal: Blood Pressure in  Desired Range  Intervention: Maintain Blood Pressure Management  Flowsheets  Taken 6/1/2024 2205  Medication Review/Management: medications reviewed  Taken 6/1/2024 2006  Medication Review/Management: medications reviewed     Problem: Skin Injury Risk Increased  Goal: Skin Health and Integrity  Intervention: Promote and Optimize Oral Intake  Flowsheets (Taken 6/2/2024 0349)  Oral Nutrition Promotion:   medicated   rest periods promoted  Intervention: Optimize Skin Protection  Flowsheets  Taken 6/2/2024 0239  Head of Bed (HOB) Positioning: HOB elevated  Taken 6/2/2024 0020  Pressure Reduction Techniques:   frequent weight shift encouraged   weight shift assistance provided  Head of Bed (HOB) Positioning: HOB elevated  Pressure Reduction Devices:   alternating pressure pump (ADD)   positioning supports utilized  Skin Protection:   adhesive use limited   incontinence pads utilized   transparent dressing maintained   tubing/devices free from skin contact  Taken 6/1/2024 2205  Head of Bed (HOB) Positioning: HOB elevated  Taken 6/1/2024 2006  Pressure Reduction Techniques:   frequent weight shift encouraged   weight shift assistance provided  Head of Bed (HOB) Positioning: HOB elevated  Pressure Reduction Devices:   alternating pressure pump (ADD)   positioning supports utilized  Skin Protection:   adhesive use limited   incontinence pads utilized   transparent dressing maintained   tubing/devices free from skin contact     Problem: Fall Injury Risk  Goal: Absence of Fall and Fall-Related Injury  Intervention: Identify and Manage Contributors  Flowsheets  Taken 6/1/2024 2205  Medication Review/Management: medications reviewed  Taken 6/1/2024 2006  Medication Review/Management: medications reviewed  Intervention: Promote Injury-Free Environment  Flowsheets  Taken 6/2/2024 0239  Safety Promotion/Fall Prevention:   activity supervised   assistive device/personal items within reach   clutter free environment maintained   fall  prevention program maintained   lighting adjusted   nonskid shoes/slippers when out of bed   room organization consistent   safety round/check completed  Taken 6/2/2024 0020  Safety Promotion/Fall Prevention:   activity supervised   assistive device/personal items within reach   clutter free environment maintained   fall prevention program maintained   lighting adjusted   nonskid shoes/slippers when out of bed   safety round/check completed   room organization consistent  Taken 6/1/2024 2205  Safety Promotion/Fall Prevention:   activity supervised   assistive device/personal items within reach   clutter free environment maintained   fall prevention program maintained   lighting adjusted   nonskid shoes/slippers when out of bed   room organization consistent   safety round/check completed  Taken 6/1/2024 2006  Safety Promotion/Fall Prevention:   activity supervised   assistive device/personal items within reach   clutter free environment maintained   fall prevention program maintained   lighting adjusted   nonskid shoes/slippers when out of bed   safety round/check completed   room organization consistent   Goal Outcome Evaluation:  Plan of Care Reviewed With: patient        Progress: no change  Outcome Evaluation: No acute changes. AOx3, disoriented to situation. O2 in use @ 3.5 to 4L via NC. Drops w/ exertion. Incontinent @ times. Assist x2. Makes needs known. Medicated for pain per MAR. Wife @ bedside. Pleasant. Bed locked and in lowest position. Side rails up x2. Call light within reach. Will continue to assess.

## 2024-06-02 NOTE — PROGRESS NOTES
PROGRESS NOTE      Patient Name: Dilip Verma  : 1950  MRN: [id number]  Primary Care Physician: Fernando Camargo DO  Date of admission: 2024    Patient Care Team:  Fernando Camargo DO as PCP - General (Family Medicine)  Morris Cai MD as Consulting Physician (Sleep Medicine)  Michaela Hylton MD as Consulting Physician (Cardiology)  Hardy Banerjee MD as Consulting Physician (Ophthalmology)  Chula Christianson MD as Consulting Physician (Ophthalmology)  Jason Sherman MD (Endocrinology)  Perla Mayen MD as Consulting Physician (Nephrology)  Federico Hebert MD as Consulting Physician (Pulmonary Disease)  Niraj Ravi MD as Consulting Physician (Orthopedic Surgery)  Papa Velasco MD as Consulting Physician (Urology)  Terese Yost MD as Consulting Physician (Gastroenterology)  Aracelis Ball MD as Consulting Physician (Colon and Rectal Surgery)  [facility] as Primary Care Provider        Reason for Follow up:     AMENA, CKD III      Subjective:     Seen and examined, no distress  sCr 0.94, only 650cc UOP charted    Review of systems:  Constitutional: Confusion, weakness.  HEENT:  No headache, otalgia, itchy eyes, nasal discharge or sore throat.  Cardiac:  No chest pain, dyspnea, orthopnea or PND.  Chest:              No cough, phlegm or wheezing.  Abdomen:  No abdominal pain, nausea or vomiting.  Neuro:  No focal weakness, abnormal movements orseizure like activity.  :   Dysuria  ROS was otherwise negative except as mentioned in the [place].       Personal History:     Past Medical History:   Past Medical History:   Diagnosis Date    AMENA (acute kidney injury) 2020    Alcoholism 1989    not since     CORI positive     Anemia     Anxiety     Ataxia     Atypical chest pain 2022    SEEN AT [facility] ER    Balance disorder     Carotid stenosis 3/28/2024    Cataract     BILATERAL, S/P EXTRACTION    Cervical radiculopathy 2019    SEEN AT   "[facility] ER    Cervical spinal cord compression     Chronic diastolic (congestive) heart failure     Chronic kidney disease     STAGE 3, FOLLOWED BY DR. VIVAR    Chronic pancreatitis     Closed left subtrochanteric femur fracture 12/01/2020    ADMITTED TO [facility]    Closed nondisplaced intertrochanteric fracture of left femur 12/01/2020    ADMITTED TO [facility]    Colon polyps     FOLLOWED BY DR. AVTAR JEONG    Constipation     Contracture, right hand     Coronary artery disease     CABG 7/2019    COVID-19 07/2022    DDD (degenerative disc disease), cervical     Diabetes mellitus, type II     IDDM    Diabetic retinopathy     Difficulty walking     Dysphagia     Elevated brain natriuretic peptide (BNP) level 08/2014    Elevated LFTs 03/2021    Erectile dysfunction     Fissure, anal 2022    Foot drop     Fuchs' corneal dystrophy of right eye     Glaucoma     BILATERAL    History of alcohol abuse     Hyperlipidemia     Hypersomnia     Hypertension     Hypertensive urgency 02/25/2020    ADMITTED TO [facility]    Insomnia     Kidney stones     LV dysfunction 06/2016    Lyme disease     Lymphadenopathy syndrome 05/2021    Macular edema     BILATERAL    Myocardial infarction 11/05/2019    NSTEMI, ADMITTED TO [facility]    Myocardial infarction 07/12/2019    NSTEMI, ADMITTED TO [facility]    Neuropathy in diabetes     Non-celiac gluten sensitivity     Orthostasis     Osteoporosis     Oxygen dependent     PAD (peripheral artery disease)     PNA (pneumonia) 06/2016    LEFT LOBE    Polyneuropathy     PTSD (post-traumatic stress disorder)     Pulmonary hypertension     Pulmonary nodule     Senile ectropion of both lower eyelids 02/2022    Sepsis 12/16/2020    D/T UTI, ADMITTED TO [facility]    Sleep apnea     STATES DOESN'T USE BIPAP OR CPAP    Spinal stenosis in cervical region     SEVERE-LIMITED ROM    Stroke 2012    \"slight stroke\"    Syncope and collapse 07/06/2019    ADMITTED TO [facility]    Urinary retention 04/05/2021    SEEN AT [facility] ER    Vision loss     Vitamin D " deficiency        Surgical History:      Past Surgical History:   Procedure Laterality Date    CARDIAC CATHETERIZATION N/A 07/15/2019    Procedure: Coronary angiography;  Surgeon: Carrie Price MD;  Location:  [facility] CATH [facility]VASIVE LOCATION;  Service: Cardiovascular    CARDIAC CATHETERIZATION N/A 07/15/2019    Procedure: Left Heart Cath;  Surgeon: Carrie Price MD;  Location:  [facility];  Service: Cardiovascular    CARDIAC CATHETERIZATION N/A 07/15/2019    Procedure: Left ventriculography;  Surgeon: Carrie Price MD;  Location:  [facility];  Service: Cardiovascular    CARDIAC CATHETERIZATION  07/15/2019    Procedure: Functional Flow [place];  Surgeon: Carrie Price MD;  Location:  [facility]VASIVE LOCATION;  Service: Cardiovascular    CARDIAC CATHETERIZATION N/A 11/06/2019    Procedure: Right and Left Heart Cath;  Surgeon: Mya Smith MD;  Location:  [facility] LOCATION;  Service: Cardiovascular    CARDIAC CATHETERIZATION N/A 11/06/2019    Procedure: Coronary angiography;  Surgeon: Mya Smith MD;  Location: [facility] C[facility];  Service: Cardiovascular    CARDIAC SURGERY      CATARACT EXTRACTION Left 2014    CATARACT EXTRACTION Right 11/2016    PHACO/IOL, DR. LEN DENTON    COLONOSCOPY N/A 03/16/2023    ENTIRE COLON WNL, RESCOPE IN 5 YRS, DR. LINDA LIZARRAGA AT [facility]    COLONOSCOPY W/ POLYPECTOMY N/A 01/02/2015    A FEW DIVERTICULA IN SIGMOID, 6 MM TUBULOVILLOUS ADENOMA POLYP IN RECTUM, SMALL HEMORRHOIDS, MELANOSIS COLI, DR. AVTAR JEONG AT [facility] ENDOSCOPY    CORONARY ARTERY BYPASS GRAFT N/A 07/18/2019    Procedure: INTRAOPERATIVE SHOAIB; STERNOTOMY CORONARY ARTERY BYPASS x 3  USING LEFT INTERNAL MAMMARY ARTERY GRAFT UTILIZING ENDOSCOPICALLY HARVESTED RIGHT GREATER SAPHENOUS VEIN AND PRP.;  Surgeon: Bill Devi MD;  Location: [facility] OR;  Service: Cardiothoracic    CYSTOSCOPY BLADDER STONE LITHOTRIPSY N/A     DENTAL PROCEDURE  Bilateral     3 surgeries inder implants    FEMUR IM NAILING/RODDING Left 12/03/2020    Procedure: LEFT HIP INTRAMEDULLARY NAIL;  Surgeon: Niraj Ravi MD;  Location: [facility] OR;  Service: Orthopedic Spine;  Laterality: Left;    INCISION AND DRAINAGE PERIRECTAL ABSCESS N/A 10/04/2023    Procedure: Incision and drainage of perianal and buttock abscess;  Surgeon: Herbie Villatoro MD;  Location: [facility] OR;  Service: General;  Laterality: N/A;    INGUINAL HERNIA REPAIR Bilateral     TOENAIL EXCISION  06/2022    TONSILLECTOMY Bilateral     TOTAL HIP ARTHROPLASTY REVISION Left 01/11/2022    Procedure: TOTAL HIP ARTHROPLASTY REVISION- POSTERIOR;  Surgeon: Jurgen Candelaria II, MD;  Location: [facility] OR;  Service: Orthopedics;  Laterality: Left;    VASECTOMY N/A        Family History: family history includes Alcohol abuse in his brother and paternal grandfather; Arrhythmia in his mother; Cancer in his mother; Depression in his mother and sister; Glaucoma in his maternal grandmother; Heart attack in his paternal grandmother; Heart disease in his father and mother; Heart failure in his father; Hyperlipidemia in his father and mother; Hypertension in his father and mother; Kidney disease in his father; Lung disease in his paternal uncle; Mental illness in his mother; Migraines in his mother; Stroke in his maternal grandmother and paternal grandmother; Thyroid disease in his father and paternal uncle; Uterine cancer in his mother. Otherwise pertinent FHx was reviewed and unremarkable.     Social History:  reports that he quit smoking about 24 years ago. His smoking use included cigarettes. He started smoking about 40 years ago. He has a 12 pack-year smoking history. He has been exposed to tobacco smoke. He has never used smokeless tobacco. He reports that he does not currently use alcohol. He reports that he does not use drugs.    Medications:  Prior to Admission medications    Medication Sig Start  Date End Date Taking? Authorizing Provider   aspirin 81 MG EC tablet Take 1 tablet by mouth Every Night.   Yes Heri Rinaldi MD   brimonidine (ALPHAGAN) 0.2 % ophthalmic solution Administer 1 drop to both eyes 2 (Two) Times a Day.   Yes Heri Rinaldi MD   bumetanide (BUMEX) 1 MG tablet Take 1 tablet by mouth Daily.   Yes Heri Rinaldi MD   Cholecalciferol (Vitamin D-3) 125 MCG (5000 UT) tablet Take 1 tablet by mouth Daily.   Yes Heri Rinaldi MD   DULoxetine (CYMBALTA) 30 MG capsule Take 1 capsule by mouth Every Night. 1/16/24  Yes Heri Rinaldi MD   Insulin Glargine, 2 Unit Dial, (TOUJEO) 300 UNIT/ML solution pen-injector injection Inject 24 Units under the skin into the appropriate area as directed Daily.   Yes Heri Rinaldi MD   insulin lispro (humaLOG) 100 UNIT/ML injection Inject 0-14 Units under the skin into the appropriate area as directed 3 (Three) Times a Day Before Meals.  Patient taking differently: Inject 0-14 Units under the skin into the appropriate area as directed 3 (Three) Times a Day Before Meals. SLIDING SCALE  100-150 - 4 units  151-200 - 5 units  201-250 - 6 units  251-300 - 7 units  301-350 - 8 units  351-400 - 9 units  401+ - 10 units 12/7/20  Yes Amador Valverde MD   latanoprost (XALATAN) 0.005 % ophthalmic solution Administer 1 drop to both eyes every night at bedtime. 1/16/23  Yes Heri Rinaldi MD   multivitamin with minerals (MULTIVITAMIN ADULTS PO) Take 1 tablet by mouth Daily.   Yes Heri Rinaldi MD   testosterone (ANDROGEL) 25 MG/2.5GM (1%) gel gel Place 25 mg on the skin as directed by provider 2 (Two) Times a Week.   Yes Heri Rinaldi MD   traMADol (ULTRAM) 50 MG tablet Take 1 tablet by mouth At Night As Needed. 10/16/23  Yes Heri Rinaldi MD   Magnesium 400 MG capsule Take 400 mg by mouth As Needed.    Heri Rinaldi MD   sildenafil (REVATIO) 20 MG tablet Take 1 tablet by mouth As Needed.     Provider, MD Heri     Scheduled Meds:aspirin, 81 mg, Oral, Daily  brimonidine, 1 drop, Both Eyes, BID  DULoxetine, 30 mg, Oral, Daily  enoxaparin, 40 mg, Subcutaneous, Daily  hydrALAZINE, 25 mg, Oral, Q8H  [START ON 6/3/2024] insulin glargine, 20 Units, Subcutaneous, Daily  insulin lispro, 2-9 Units, Subcutaneous, 4x Daily AC & at Bedtime  latanoprost, 1 drop, Both Eyes, Nightly  OLANZapine, 5 mg, Oral, Nightly  piperacillin-tazobactam, 3.375 g, Intravenous, Q8H  senna-docusate sodium, 2 tablet, Oral, BID  sodium chloride, 10 mL, Intravenous, Q12H  terazosin, 1 mg, Oral, Nightly  vancomycin, 20 mg/kg, Intravenous, Once  [START ON 6/3/2024] vancomycin, 1,500 mg, Intravenous, Q24H      Continuous Infusions:Pharmacy Consult - Pharmacy to dose,       PRN Meds:  acetaminophen **OR** acetaminophen **OR** acetaminophen    senna-docusate sodium **AND** polyethylene glycol **AND** bisacodyl **AND** bisacodyl    dextrose    dextrose    glucagon (human recombinant)    nitroglycerin    OLANZapine    ondansetron    Pharmacy Consult - Pharmacy to dose    sodium chloride    sodium chloride    traMADol  Allergies:    Allergies   Allergen Reactions    Ace Inhibitors Angioedema    Angiotensin Receptor Blockers Angioedema and Unknown (See Comments)     Angioneurotic edema    Dapagliflozin Other (See Comments)     UTI,  rectal abcess    Losartan Angioedema     Angioneurotic edema    Seroquel [Quetiapine] Hallucinations    Xanax [Alprazolam] Unknown - High Severity    Amlodipine Swelling    Ativan [Lorazepam] Hallucinations    Baclofen Hallucinations    Misc. Sulfonamide Containing Compounds Unknown (See Comments)    Minoxidil Confusion    Tetracycline Rash     bliisters in mouth         Objective   Exam:     Vital Signs  Temp:  [98.1 °F (36.7 °C)-98.8 °F (37.1 °C)] 98.1 °F (36.7 °C)  Heart Rate:  [93-99] 96  Resp:  [18] 18  BP: (108-202)/(74-96) 141/78  SpO2:  [91 %-92 %] 92 %  on  Flow (L/min):  [3-4] 4;   Device (Oxygen  Therapy): nasal cannula  Body mass index is 31.14 kg/m².  EXAM  General:  Chronically ill appearing male in no acute distress.    Head:      Normocephalic and atraumatic.    Eyes:      PERRL/EOM intact, conjunctivae and sclerae clear without nystagmus.    Neck:      No masses, thyromegaly,  trachea central   Lungs:    Clear bilaterally to auscultation.    Heart:      Regular rate and rhythm, no murmur no gallop  Abd:        Soft, nontender, not distended, bowel sounds positive, no shifting dullness.  Msk:        No deformity or scoliosis noted of thoracic or lumbar spine.    Pulses:   Pulses normal in all 4 extremities.    Extremities:        No cyanosis or clubbing- no edema.    Neuro:    No focal deficits.   alert oriented x3  Skin:       Intact without lesions or rashes.    Psych:    Alert and cooperative; normal mood and affect; normal attention span       Results Review:  I have personally reviewed most recent Data :  [facility]  CBC    Results from last 7 days   Lab Units 06/02/24  0752 06/01/24  0551 05/31/24  0620 05/30/24  0433   WBC 10*3/mm3 7.94 8.26 14.38* 30.36*   HEMOGLOBIN g/dL 13.0 13.2 13.4 16.4   PLATELETS 10*3/mm3 204 168 184 230     CMP   Results from last 7 days   Lab Units 06/02/24  0752 06/01/24  0551 05/31/24  0620 05/30/24  0955 05/30/24  0433   SODIUM mmol/L 141 138 137 140 138   POTASSIUM mmol/L 4.2 4.5 4.4 4.0 5.5*   CHLORIDE mmol/L 107 106 102 107 98   CO2 mmol/L 24.0 23.4 27.0 24.6 29.2*   BUN mg/dL 23 31* 32* 22 24*   CREATININE mg/dL 0.94 1.22 1.51* 0.90 1.17   GLUCOSE mg/dL 74 166* 97 228* 306*   ALBUMIN g/dL 3.2*  --  3.3*  --  4.2   BILIRUBIN mg/dL  --   --  0.6  --  1.3*   ALK PHOS U/L  --   --  60  --  86   AST (SGOT) U/L  --   --  30  --  33   ALT (SGPT) U/L  --   --  18  --  31   AMMONIA umol/L  --   --   --   --  20     ABG    Results from last 7 days   Lab Units 05/30/24  0505   PH, ARTERIAL pH units 7.420   PCO2, ARTERIAL mm Hg 44.0   PO2 ART mm Hg 56.9*    O2 SATURATION ART % 89.5*   BASE EXCESS ART mmol/L 3.3*     US Renal Bilateral    Result Date: 5/31/2024  Thickened urinary bladder wall. Low volume bladder. No hydronephrosis.  This report was finalized on 5/31/2024 6:25 PM by Dr. Zechariah Falcon M.D on Workstation: [id number]      CT Abdomen Pelvis With Contrast    Result Date: 5/30/2024   1. Diffuse urinary bladder wall thickening and mild perivesical fat stranding, which could be accentuated by under distention but could reflect a nonspecific cystitis but recommend correlating with urinalysis. 2. Low lung volumes with multifocal bilateral perihilar and bibasilar opacities that likely represent subsegmental atelectasis and/or scarring. 3. Likely benign 9 mm subpleural nodule in the base of the lingula and likely reactive mediastinal and bilateral hilar lymphadenopathy given the long-term stability. 4. Nonspecific fat stranding in the left inguinal region. Correlate for recent trauma or instrumentation. 5. Chronic appearing splenic vein thrombosis with peripheral calcification and multiple venous collaterals in the left upper quadrant. Additional pancreatic/peripancreatic calcifications could reflect sequela of chronic pancreatitis. 6. Mildly heterogeneous hepatic attenuation with subtle nodular liver contours, which could reflect underlying hepatocellular disease/cirrhosis. 7. Tiny hiatal hernia with mild nonspecific thickening of the distal esophageal wall and mild gastric wall thickening that could be accentuated by under distention. Correlate for GERD and possible gastritis.  This report was finalized on 5/30/2024 7:35 AM by Julio Thomas MD on Workstation: [id number]      CT Chest With Contrast Diagnostic    Result Date: 5/30/2024   1. Diffuse urinary bladder wall thickening and mild perivesical fat stranding, which could be accentuated by under distention but could reflect a nonspecific cystitis but recommend correlating with urinalysis. 2. Low lung  volumes with multifocal bilateral perihilar and bibasilar opacities that likely represent subsegmental atelectasis and/or scarring. 3. Likely benign 9 mm subpleural nodule in the base of the lingula and likely reactive mediastinal and bilateral hilar lymphadenopathy given the long-term stability. 4. Nonspecific fat stranding in the left inguinal region. Correlate for recent trauma or instrumentation. 5. Chronic appearing splenic vein thrombosis with peripheral calcification and multiple venous collaterals in the left upper quadrant. Additional pancreatic/peripancreatic calcifications could reflect sequela of chronic pancreatitis. 6. Mildly heterogeneous hepatic attenuation with subtle nodular liver contours, which could reflect underlying hepatocellular disease/cirrhosis. 7. Tiny hiatal hernia with mild nonspecific thickening of the distal esophageal wall and mild gastric wall thickening that could be accentuated by under distention. Correlate for GERD and possible gastritis.  This report was finalized on 5/30/2024 7:35 AM by Julio Thomas MD on Workstation: [id number]      CT Head Without Contrast    Result Date: 5/30/2024   No acute process identified.    Radiation dose reduction techniques were utilized, including automated exposure control and exposure modulation based on body size.       XR Chest 1 View    Result Date: 5/30/2024  Nonspecific bibasilar consolidation.  This report was finalized on 5/30/2024 4:47 AM by Dr. Gayle Okeefe M.D on Workstation: [id number]       Results for orders placed during the hospital encounter of 10/05/22    Adult Transthoracic Echo Complete W/ Cont if Necessary Per Protocol    Interpretation Summary  · Calculated left ventricular EF = 66.1% Estimated left ventricular EF was in agreement with the calculated left ventricular EF. Left ventricular systolic function is normal.Left ventricular wall thickness is consistent with mild concentric hypertrophy.Abnormal global  longitudinal LV strain (GLS) = -12.3%.  · Left ventricular diastolic function is consistent with (grade II w/high LAP) pseudonormalization.Elevated left atrial pressure.  · The aortic valve exhibits sclerosis. There is restricted mobility of the right coronary cusp. There is no significant stenosis or regurgitation noted.        Assessment & Plan   Assessment and Plan:         Acute UTI (urinary tract infection)    Type 2 diabetes mellitus with hyperglycemia, with long-term current use of insulin    CAD (coronary artery disease)    Hx of CABG    Chronic diastolic heart failure    CSA (central sleep apnea)    HTN (hypertension)    History of stroke    CKD (chronic kidney disease) stage 3, GFR 30-59 ml/min    Peripheral arterial disease    Sepsis    Hyperkalemia    Metabolic encephalopathy    AMENA (acute kidney injury)    ASSESSMENT:  AMENA likely prerenal ATN 2/2 urosepsis with hemodynamic changes; on CKD III etiology likely diabetic and hypertensive nephropathy with baseline sCr ~0.9-1.2 mg/dL  Hyperkalemia, now resolved  UTI, urine cultures with gram neg bacilli  Leukocytosis  Metabolic encephalopathy  PAD  Hx stroke  HTN  CHF  CAD  DM2  Chronic splenic vein thrombosis  Possible hepatocellular disease/cirrhosis  Likely benign subpleural nodule with bilateral lymphadenopathy  Hiatal hernia    Last TTE 10/5/22 with EF 66%, grade II DD    PLAN :     AMENA likely prerenal ATN 2/2 urosepsis with hemodynamic changes; on CKD III etiology likely diabetic and hypertensive nephropathy with baseline sCr ~0.9-1.2 mg/dL  Renal function now back to baseline  Phosphorus low, will give sodium phos replacement  Home bumex on hold for now, agree  Now off IVF  Volume with CXR with bibasilar consolidation, on NC4L, LE edema, will likely need to restart bumex once renal function improves  Hyperkalemia resolved with IV insulin  Electrolytes, acid/base acceptable  UTI on empiric antibiotics per primary, urine cultures with gram neg  bacilli  BC NGTD  BP trends acceptable  Strict I&O's  Avoid NSAIDs, nephrotoxic agents  We will follow and coordinate with team      Note transcribed for Dr Saez    I have , personally performed the services described in this documentation as scribed by the above named individual in my presence, and it is both accurate and complete.  6/2/2024  12:25 EDT      Sophia Cunningham, LEIDY  [facility]  6/2/2024  12:25 EDT    AMENA much improved.  Creatinine back to baseline.  Slightly hypertensive and has some edema and hence we will go ahead and restart his home diuretic Bumex 1 mg p.o. daily: Discussed with patient and his wife.  Electrolytes are all stable.  Continue treatment of infection with antibiotics as per primary team.  Hypertension control expected to improve with reinstituting diuretic.  Serratia UTI on ABx  Vancomycin trough not to exceed 15-20 please.

## 2024-06-02 NOTE — PLAN OF CARE
Problem: Adult Inpatient Plan of Care  Goal: Plan of Care Review  Flowsheets (Taken 6/2/2024 5973)  Outcome Evaluation: AOX2-4, forgetful. Still on 4 L, SpO2 drops to low 80s when attempting to decrease. LLE red, edematous, painful to touch - LHA aware, ace wrapped and elevated per orders. IV abx, ID consult called but has not seen yet. Max assist x2 to BSC and chair several times today. Chair alarm on, family at bedside, call light within reach.   Goal Outcome Evaluation:              Outcome Evaluation: AOX2-4, forgetful. Still on 4 L, SpO2 drops to low 80s when attempting to decrease. LLE red, edematous, painful to touch - LHA aware, ace wrapped and elevated per orders. IV abx, ID consult called but has not seen yet. Max assist x2 to BSC and chair several times today. Chair alarm on, family at bedside, call light within reach.

## 2024-06-02 NOTE — PROGRESS NOTES
"[facility] Clinical Pharmacy Services: Vancomycin Pharmacokinetic Initial Consult Note + Zosyn    Dilip Verma is a 74 y.o. male who is on day 1 of pharmacy to dose vancomycin and zosyn    Indication: Skin and Soft Tissue  Consulting Provider: Dr Gregg  Planned Duration of Therapy: TBD  Loading Dose Ordered or Given: see below  MRSA PCR performed: n/a  Culture/Source:   5/30 UCx = serratia  5/30 BCx x2 x no growth x 3 days      Target: -600 mg/L.hr   Pertinent Vanc Dosing History: none    Vitals/Labs  Ht: 172.7 cm (68\"); Wt: 92.9 kg (204 lb 12.9 oz)  Temp Readings from Last 1 Encounters:   06/02/24 98.1 °F (36.7 °C) (Oral)    Estimated Creatinine Clearance: 57.9 mL/min (by C-G formula based on SCr of 1.22 mg/dL).       Results from last 7 days   Lab Units 06/02/24  0752 06/01/24  0551 05/31/24  0620 05/30/24  0955   CREATININE mg/dL  --  1.22 1.51* 0.90   WBC 10*3/mm3 7.94 8.26 14.38*  --      Assessment/Plan:    Vancomycin Dose:   1750 mg IV x1 for loading dose then 1500 mg IV every 24 hours  Predictive AUC level for the dose ordered is 497 mg/L.hr, which is within the target of 400-600 mg/L.hr  Vanc Trough has been ordered for 6/4 at 1000   Zosyn 3.375 gm IV q8h per extended infusion protocol    Pharmacy will follow patient's kidney function and will adjust doses and obtain levels as necessary. Thank you for involving pharmacy in this patient's care. Please contact pharmacy with any questions or concerns.                           Deepak Ruiz, [facility]  Clinical Pharmacist    "

## 2024-06-02 NOTE — THERAPY TREATMENT NOTE
Patient Name: Dilip Verma  : 1950    MRN: [id number]                              Today's Date: 2024       Admit Date: 2024    Visit Dx:     ICD-10-CM ICD-9-CM   1. Acute respiratory failure with hypoxia  J96.01 518.81   2. Sepsis without acute organ dysfunction, due to unspecified organism  A41.9 038.9     995.91   3. Hyperkalemia  E87.5 276.7   4. Metabolic encephalopathy  G93.41 348.31   5. Acute UTI  N39.0 599.0   6. Hyperglycemia due to diabetes mellitus  E11.65 250.02     Patient Active Problem List   Diagnosis    Anxiety    Colon polyp    Type 2 diabetes mellitus with hyperglycemia, with long-term current use of insulin    Erectile dysfunction    Hyperlipidemia    Type 2 acute myocardial infarction    CAD (coronary artery disease)    Hx of CABG    Chronic diastolic heart failure    Hypersomnia due to medical condition    CSA (central sleep apnea)    Periodic breathing    HTN (hypertension)    History of stroke    CKD (chronic kidney disease) stage 3, GFR 30-59 ml/min    Urinary retention    Acute on chronic renal failure    Acute UTI (urinary tract infection)    Acute on chronic diastolic (congestive) heart failure    Bacteriuria    Rectal pain    Status post total replacement of hip    Vitamin D deficiency    Post-acute COVID-19 syndrome    Insulin dose changed    Arthropathy associated with neurological disorder    Personal history of colonic polyps    Type 2 diabetes mellitus with diabetic neuropathy, with long-term current use of insulin    Cervical spinal stenosis    Abscess of skin    Overweight    Cervical stenosis of spine    Spinal stenosis, lumbar region, without neurogenic claudication    Medically noncompliant    Chronic heart failure with preserved ejection fraction (HFpEF)    Carotid stenosis    Peripheral arterial disease    Sepsis    Hyperkalemia    Metabolic encephalopathy    AMENA (acute kidney injury)     Past Medical History:   Diagnosis Date    AMENA (acute kidney injury)  12/03/2020    Alcoholism 1989    not since 1998    CORI positive     Anemia     Anxiety     Ataxia     Atypical chest pain 04/19/2022    SEEN AT [facility]    Balance disorder     Carotid stenosis 3/28/2024    Cataract     BILATERAL, S/P EXTRACTION    Cervical radiculopathy 11/11/2019    SEEN AT  [facility]    Cervical spinal cord compression     Chronic diastolic (congestive) heart failure     Chronic kidney disease     STAGE 3, FOLLOWED BY DR. VIVAR    Chronic pancreatitis     Closed left subtrochanteric femur fracture 12/01/2020    ADMITTED TO [facility]    Closed nondisplaced intertrochanteric fracture of left femur 12/01/2020    ADMITTED TO [facility]    Colon polyps     FOLLOWED BY DR. AVTAR JEONG    Constipation     Contracture, right hand     Coronary artery disease     CABG 7/2019    COVID-19 07/2022    DDD (degenerative disc disease), cervical     Diabetes mellitus, type II     IDDM    Diabetic retinopathy     Difficulty walking     Dysphagia     Elevated brain natriuretic peptide (BNP) level 08/2014    Elevated LFTs 03/2021    Erectile dysfunction     Fissure, anal 2022    Foot drop     Fuchs' corneal dystrophy of right eye     Glaucoma     BILATERAL    History of alcohol abuse     Hyperlipidemia     Hypersomnia     Hypertension     Hypertensive urgency 02/25/2020    ADMITTED TO [facility]    Insomnia     Kidney stones     LV dysfunction 06/2016    Lyme disease     Lymphadenopathy syndrome 05/2021    Macular edema     BILATERAL    Myocardial infarction 11/05/2019    NSTEMI, ADMITTED TO [facility]    Myocardial infarction 07/12/2019    NSTEMI, ADMITTED TO [facility]    Neuropathy in diabetes     Non-celiac gluten sensitivity     Orthostasis     Osteoporosis     Oxygen dependent     PAD (peripheral artery disease)     PNA (pneumonia) 06/2016    LEFT LOBE    Polyneuropathy     PTSD (post-traumatic stress disorder)     Pulmonary hypertension     Pulmonary nodule     Senile ectropion of both lower eyelids 02/2022    Sepsis 12/16/2020    D/T UTI,  "ADMITTED TO [facility]    Sleep apnea     STATES DOESN'T USE BIPAP OR CPAP    Spinal stenosis in cervical region     SEVERE-LIMITED ROM    Stroke 2012    \"slight stroke\"    Syncope and collapse 07/06/2019    ADMITTED TO [facility]    Urinary retention 04/05/2021    SEEN AT [facility] ER    Vision loss     Vitamin D deficiency      Past Surgical History:   Procedure Laterality Date    CARDIAC CATHETERIZATION N/A 07/15/2019    Procedure: Coronary angiography;  Surgeon: Carrie Price MD;  Location:  [facility];  Service: Cardiovascular    CARDIAC CATHETERIZATION N/A 07/15/2019    Procedure: Left Heart Cath;  Surgeon: Carrie Price MD;  Location:  [facility];  Service: Cardiovascular    CARDIAC CATHETERIZATION N/A 07/15/2019    Procedure: Left ventriculography;  Surgeon: Carrie Price MD;  Location:  [facility];  Service: Cardiovascular    CARDIAC CATHETERIZATION  07/15/2019    Procedure: Functional Flow [place];  Surgeon: Carrie Price MD;  Location:  [facility];  Service: Cardiovascular    CARDIAC CATHETERIZATION N/A 11/06/2019    Procedure: Right and Left Heart Cath;  Surgeon: Mya Smith MD;  Location:  [facility];  Service: Cardiovascular    CARDIAC CATHETERIZATION N/A 11/06/2019    Procedure: Coronary angiography;  Surgeon: Mya Smith MD;  Location:  [facility];  Service: Cardiovascular    CARDIAC SURGERY      CATARACT EXTRACTION Left 2014    CATARACT EXTRACTION Right 11/2016    PHACO/IOL, DR. LEN DENTON    COLONOSCOPY N/A 03/16/2023    ENTIRE COLON WNL, RESCOPE IN 5 YRS, DR. LINDA LIZARRAGA AT [facility]    COLONOSCOPY W/ POLYPECTOMY N/A 01/02/2015    A FEW DIVERTICULA IN SIGMOID, 6 MM TUBULOVILLOUS ADENOMA POLYP IN RECTUM, SMALL HEMORRHOIDS, MELANOSIS COLI, DR. AVTAR JEONG AT [facility]    CORONARY ARTERY BYPASS GRAFT N/A 07/18/2019    Procedure: INTRAOPERATIVE SHOAIB; STERNOTOMY CORONARY ARTERY BYPASS x " 3  USING LEFT INTERNAL MAMMARY ARTERY GRAFT UTILIZING ENDOSCOPICALLY HARVESTED RIGHT GREATER SAPHENOUS VEIN AND PRP.;  Surgeon: Bill Devi MD;  Location: [facility] MAIN OR;  Service: Cardiothoracic    CYSTOSCOPY BLADDER STONE LITHOTRIPSY N/A     DENTAL PROCEDURE Bilateral     3 surgeries inder implants    FEMUR IM NAILING/RODDING Left 12/03/2020    Procedure: LEFT HIP INTRAMEDULLARY NAIL;  Surgeon: Niraj Ravi MD;  Location: [facility] MAIN OR;  Service: Orthopedic Spine;  Laterality: Left;    INCISION AND DRAINAGE PERIRECTAL ABSCESS N/A 10/04/2023    Procedure: Incision and drainage of perianal and buttock abscess;  Surgeon: Herbie Villatoro MD;  Location: [facility] MAIN OR;  Service: General;  Laterality: N/A;    INGUINAL HERNIA REPAIR Bilateral     TOENAIL EXCISION  06/2022    TONSILLECTOMY Bilateral     TOTAL HIP ARTHROPLASTY REVISION Left 01/11/2022    Procedure: TOTAL HIP ARTHROPLASTY REVISION- POSTERIOR;  Surgeon: Jurgen Candelaria II, MD;  Location: [facility] OR;  Service: Orthopedics;  Laterality: Left;    VASECTOMY N/A       General Information       Row Name 06/02/24 1417          Physical Therapy Time and Intention    Document Type therapy note (daily note)  -PC     Mode of Treatment physical therapy  -PC       Row Name 06/02/24 1417          General Information    Existing Precautions/Restrictions fall;oxygen therapy device and L/min  -PC       Row Name 06/02/24 1417          Cognition    Orientation Status (Cognition) oriented to;person;disoriented to;place;situation;time  -PC               User Key  (r) = Recorded By, (t) = Taken By, (c) = Cosigned By      Initials Name Provider Type    PC Gemma Hamilton PT Physical Therapist                   Mobility       Row Name 06/02/24 1417          Bed Mobility    Sit-Supine [place] (Bed Mobility) moderate assist (50% patient effort);2 person assist  -PC       Row Name 06/02/24 1417          Sit-Stand Transfer    Sit-Stand [place]  (Transfers) moderate assist (50% patient effort);maximum assist (25% patient effort);2 person assist  -PC     Assistive Device (Sit-Stand Transfers) walker, front-wheeled  -PC     Comment, (Sit-Stand Transfer) sit to stand x 2  -PC       Row Name 06/02/24 1417          Gait/Stairs (Locomotion)    [place] Level (Gait) moderate assist (50% patient effort);maximum assist (25% patient effort);2 person assist  -PC     Assistive Device (Gait) walker, front-wheeled  -PC     Distance in Feet (Gait) 3  -PC     Deviations/Abnormal Patterns (Gait) weight shifting decreased;stride length decreased;farhana decreased;gait speed decreased  -PC     Bilateral Gait Deviations forward flexed posture;heel strike decreased;knee hyperextension  -PC     Left Sided Gait Deviations foot drop/toe drag;forward flexed posture  -PC     Right Sided Gait Deviations foot drop/toe drag;forward flexed posture  -PC     Comment, (Gait/Stairs) pt with difficulty grasping rolling walker with RUE, both knees are hyperextended, pt is forward flexed, difficulty advancing LEs, pt amb to chair first, then tried again, amb 3 ft forward and chair brought up  -PC               User Key  (r) = Recorded By, (t) = Taken By, (c) = Cosigned By      Initials Name Provider Type    PC Gemma Hamilton PT Physical Therapist                   Obj/Interventions       Row Name 06/02/24 1422          Strength Comprehensive (MMT)    Comment, General Manual Muscle Testing (MMT) Assessment B foot drop  -PC       Row Name 06/02/24 1422          Motor Skills    Therapeutic Exercise --  B LAQ 5 reps  -PC       Row Name 06/02/24 1422          Balance    Static Sitting Balance contact guard  -PC     Dynamic Sitting Balance minimal assist  -PC     Static Standing Balance moderate assist  -PC     Dynamic Standing Balance maximum assist;moderate assist  -PC     Position/Device Used, Standing Balance walker, rolling  -PC               User Key  (r) = Recorded By, (t) = Taken By,  (c) = Cosigned By      Initials Name Provider Type    PC Gemma Hamilton, PT Physical Therapist                   Goals/Plan    No documentation.                  Clinical Impression       Row Name 06/02/24 1422          Pain    Pretreatment Pain Rating 0/10 - no pain  -PC       Row Name 06/02/24 1422          Plan of Care Review    Plan of Care Reviewed With patient;spouse  -PC     Outcome Evaluation Pt with increased confusion today, he continues to present with significant deficits affecting mobility, he is requiring mod/max a x 2 to stand, he is unable to walk safely even with mod/max a x 2, 2 attempts at taking forward steps today and pt needed mod/max a x 2, poor balance, unable to grasp walker with RUE well, B LE weakness with B knee hyperextension, and with confusion he lacks safety awareness, recommend SNF at discharge  -PC       Row Name 06/02/24 1422          Positioning and Restraints    Pre-Treatment Position in bed  -PC     Post Treatment Position chair  -PC     In Chair reclined;call light within reach;encouraged to call for assist;exit alarm on;with family/caregiver  -PC               User Key  (r) = Recorded By, (t) = Taken By, (c) = Cosigned By      Initials Name Provider Type    PC Gemma Hamilton, PT Physical Therapist                   Outcome Measures       Row Name 06/02/24 1426          How much help from another person do you currently need...    Turning from your back to your side while in flat bed without using bedrails? 2  -PC     Moving from lying on back to sitting on the side of a flat bed without bedrails? 2  -PC     Moving to and from a bed to a chair (including a wheelchair)? 2  -PC     Standing up from a chair using your arms (e.g., wheelchair, bedside chair)? 2  -PC     Climbing 3-5 steps with a railing? 1  -PC     To walk in hospital room? 1  -PC     AM-PAC 6 Clicks Score (PT) 10  -PC     Highest Level of Mobility Goal 4 --> Transfer to chair/commode  -PC               User Key   (r) = Recorded By, (t) = Taken By, (c) = Cosigned By      Initials Name Provider Type    PC Gemma Hamilton, PT Physical Therapist                                 Physical Therapy Education       Title: PT OT SLP Therapies (Done)       Topic: Physical Therapy (Done)       Point: Mobility training (Done)       Learning Progress Summary             Patient Acceptance, E,D, DU by PC at 6/2/2024 1427    Acceptance, E,TB,D, VU by CB at 6/1/2024 2218    Acceptance, E,D, DU by PC at 5/31/2024 1612   Family Acceptance, E,TB,D, VU by CB at 6/1/2024 2218                         Point: Home exercise program (Done)       Learning Progress Summary             Patient Acceptance, E,D, DU by PC at 6/2/2024 1427    Acceptance, E,TB,D, VU by CB at 6/1/2024 2218    Acceptance, E,D, DU by PC at 5/31/2024 1612   Family Acceptance, E,TB,D, VU by CB at 6/1/2024 2218                         Point: Body mechanics (Done)       Learning Progress Summary             Patient Acceptance, E,D, DU by PC at 6/2/2024 1427    Acceptance, E,TB,D, VU by CB at 6/1/2024 2218    Acceptance, E,D, DU by PC at 5/31/2024 1612   Family Acceptance, E,TB,D, VU by CB at 6/1/2024 2218                         Point: Precautions (Done)       Learning Progress Summary             Patient Acceptance, E,D, DU by PC at 6/2/2024 1427    Acceptance, E,TB,D, VU by CB at 6/1/2024 2218    Acceptance, E,D, DU by PC at 5/31/2024 1612   Family Acceptance, E,TB,D, VU by CB at 6/1/2024 2218                                         User Key       Initials Effective Dates Name Provider Type Discipline    PC 06/16/21 -  Gemma Hamilton PT Physical Therapist PT    CB 02/21/24 -  Murphy Herndon, RN Registered Nurse Nurse                  PT Recommendation and Plan  Planned Therapy Interventions (PT): balance training, bed mobility training, gait training, transfer training, strengthening  Plan of Care Reviewed With: patient, spouse  Outcome Evaluation: Pt with increased confusion today,  he continues to present with significant deficits affecting mobility, he is requiring mod/max a x 2 to stand, he is unable to walk safely even with mod/max a x 2, 2 attempts at taking forward steps today and pt needed mod/max a x 2, poor balance, unable to grasp walker with RUE well, B LE weakness with B knee hyperextension, and with confusion he lacks safety awareness, recommend SNF at discharge     Time Calculation:         PT Charges       Row Name 06/02/24 1427             Time Calculation    Start Time 1340  -PC      Stop Time 1404  -PC      Time Calculation (min) 24 min  -PC      PT Received On 06/02/24  -PC      PT - Next Appointment 06/03/24  -PC                User Key  (r) = Recorded By, (t) = Taken By, (c) = Cosigned By      Initials Name Provider Type    PC Gemma Hamilton, PT Physical Therapist                  Therapy Charges for Today       Code Description Service Date Service Provider Modifiers Qty    84942690483 HC PT THER PROC EA 15 MIN 6/2/2024 Gemma Hamilton, PT GP 2            PT G-Codes  Outcome Measure Options: AM-PAC 6 Clicks Daily Activity (OT), Modified Jeremias  AM-PAC 6 Clicks Score (PT): 10  AM-PAC 6 Clicks Score (OT): 13  PT Discharge Summary  Anticipated Discharge Disposition (PT): skilled nursing facility    Gemma Hamilton, SHEFALI  6/2/2024

## 2024-06-02 NOTE — PLAN OF CARE
Goal Outcome Evaluation:  Plan of Care Reviewed With: patient, spouse           Outcome Evaluation: Pt with increased confusion today, he continues to present with significant deficits affecting mobility, he is requiring mod/max a x 2 to stand, he is unable to walk safely even with mod/max a x 2, 2 attempts at taking forward steps today and pt needed mod/max a x 2, poor balance, unable to grasp walker with RUE well, B LE weakness with B knee hyperextension, and with confusion he lacks safety awareness, recommend SNF at discharge      Anticipated Discharge Disposition (PT): skilled nursing facility

## 2024-06-02 NOTE — PROGRESS NOTES
[facility]    [phone]   LOS: 3 days     Name: Dilip Verma  Age/Sex: 74 y.o. male  :  1950        PCP: Fernando Camargo DO  Chief Complaint   Patient presents with    Altered Mental Status      Subjective   He is much more awake today but he is confused.  He has had some disorientation.  But he is awake and eating today which is an improvement over the last few days.  General: No Fever or Chills, Cardiac: No Chest Pain or Palpitations, Resp: No Cough or SOA, GI: No Nausea, Vomiting, or Diarrhea, and Other: No bleeding    aspirin, 81 mg, Oral, Daily  brimonidine, 1 drop, Both Eyes, BID  DULoxetine, 30 mg, Oral, Daily  enoxaparin, 40 mg, Subcutaneous, Daily  hydrALAZINE, 25 mg, Oral, Q8H  [START ON 6/3/2024] insulin glargine, 20 Units, Subcutaneous, Daily  insulin lispro, 2-9 Units, Subcutaneous, 4x Daily AC & at Bedtime  latanoprost, 1 drop, Both Eyes, Nightly  OLANZapine, 5 mg, Oral, Nightly  piperacillin-tazobactam, 3.375 g, Intravenous, Q8H  senna-docusate sodium, 2 tablet, Oral, BID  sodium chloride, 10 mL, Intravenous, Q12H  terazosin, 1 mg, Oral, Nightly  vancomycin, 20 mg/kg, Intravenous, Once  [START ON 6/3/2024] vancomycin, 1,500 mg, Intravenous, Q24H      Pharmacy Consult - Pharmacy to dose,         Objective   Vital Signs  Temp:  [98.1 °F (36.7 °C)-98.8 °F (37.1 °C)] 98.1 °F (36.7 °C)  Heart Rate:  [93-99] 96  Resp:  [18] 18  BP: (108-202)/(74-96) 141/78  Body mass index is 31.14 kg/m².    Intake/Output Summary (Last 24 hours) at 2024 1044  Last data filed at 2024 0900  Gross per 24 hour   Intake 600 ml   Output 650 ml   Net -50 ml       Physical Exam  Constitutional:       General: He is not in acute distress.     Appearance: He is obese. He is ill-appearing.   Cardiovascular:      Rate and Rhythm: Normal rate and regular rhythm.   Pulmonary:      Effort: No respiratory distress.      Breath sounds: Normal breath sounds.   Abdominal:      General: Bowel sounds are  normal.      Palpations: Abdomen is soft.   Neurological:      Mental Status: Mental status is at baseline.           Results Review:       I reviewed the patient's new clinical results.  Results from last 7 days   Lab Units 06/02/24 0752 06/01/24  0551 05/31/24  0620 05/30/24  0433   WBC 10*3/mm3 7.94 8.26 14.38* 30.36*   HEMOGLOBIN g/dL 13.0 13.2 13.4 16.4   PLATELETS 10*3/mm3 204 168 184 230     Results from last 7 days   Lab Units 06/02/24 0752 06/01/24  0551 05/31/24  0620 05/30/24  0955 05/30/24  0433   SODIUM mmol/L 141 138 137 140 138   POTASSIUM mmol/L 4.2 4.5 4.4 4.0 5.5*   CHLORIDE mmol/L 107 106 102 107 98   CO2 mmol/L 24.0 23.4 27.0 24.6 29.2*   BUN mg/dL 23 31* 32* 22 24*   CREATININE mg/dL 0.94 1.22 1.51* 0.90 1.17   CALCIUM mg/dL 8.5* 8.3* 8.8 7.3* 9.6   MAGNESIUM mg/dL 2.1  --   --   --  1.9   PHOSPHORUS mg/dL 2.4*  --   --   --   --    Estimated Creatinine Clearance: 75.1 mL/min (by C-G formula based on SCr of 0.94 mg/dL).      Assessment & Plan   Active Hospital Problems    Diagnosis  POA    **Acute UTI (urinary tract infection) [N39.0]  Yes    AMENA (acute kidney injury) [N17.9]  Unknown    Sepsis [A41.9]  Yes    Hyperkalemia [E87.5]  Unknown    Metabolic encephalopathy [G93.41]  Unknown    Peripheral arterial disease [I73.9]  Yes    History of stroke [Z86.73]  Not Applicable    CKD (chronic kidney disease) stage 3, GFR 30-59 ml/min [N18.30]  Yes    HTN (hypertension) [I10]  Yes    CSA (central sleep apnea) [G47.31]  Yes    Chronic diastolic heart failure [I50.32]  Yes    Hx of CABG [Z95.1]  Not Applicable    CAD (coronary artery disease) [I25.10]  Yes    Type 2 diabetes mellitus with hyperglycemia, with long-term current use of insulin [E11.65, Z79.4]  Not Applicable      Resolved Hospital Problems   No resolved problems to display.       PLAN  This is a 74-year-old gentleman with a history of chronic kidney disease hypertension coronary artery disease type 2 diabetes peripheral artery disease  who presents to the hospital with metabolic encephalopathy and is found to have sepsis and acute renal failure secondary to underlying urinary tract infection  -Urine cultures growing gram-negative rods although it is not a great colony count at  50,000 CFU.  Ultimately cultures have speciated Serratia which is not a real typical urinary organism.  -This left lower extremity is looking more more like cellulitis likely originating from this abrasion on his tibial surface.  It is possible that he might have a Serratia skin infection resulting.  Transient bacteremia and transient bacteriuria.  I have adjusted antibiotics and placed him on Zosyn to cover the Serratia have also covered with vancomycin since this leg infection did not get better with Rocephin cannot rule out staph infection.  I have asked infectious disease to weigh in and assist with ongoing antibiotic decisions and management.  -Continue IV antibiotics.  PSA was within normal limits no evidence of prostatitis.  He does have some urinary retention which is also contributing to his acute renal failure.  -Continue to cath as needed.  Encourage out of bed activity as this will help with urinary retention.  Hytrin started.  -Baseline creatinine around 1.1 up to 1.5 trending down.  Nephrology following  -Mechanical DVT prophylaxis  -Full code      Disposition  Expected Discharge Date: 6/4/2024; Expected Discharge Time:        Que Gregg MD  [facility]  06/02/24  10:44 EDT

## 2024-06-03 ENCOUNTER — APPOINTMENT (OUTPATIENT)
Dept: CARDIOLOGY | Facility: HOSPITAL | Age: 74
End: 2024-06-03
Payer: MEDICARE

## 2024-06-03 LAB
ALBUMIN SERPL-MCNC: 3.2 G/DL (ref 3.5–5.2)
ANION GAP SERPL CALCULATED.3IONS-SCNC: 11.9 MMOL/L (ref 5–15)
AORTIC DIMENSIONLESS INDEX: 0.6 (DI)
ASCENDING AORTA: 3.5 CM
BASOPHILS # BLD AUTO: 0.05 10*3/MM3 (ref 0–0.2)
BASOPHILS NFR BLD AUTO: 0.6 % (ref 0–1.5)
BH CV ECHO LEFT VENTRICLE GLOBAL LONGITUDINAL STRAIN: -17.8 %
BH CV ECHO MEAS - ACS: 1.42 CM
BH CV ECHO MEAS - AO MAX PG: 10.4 MMHG
BH CV ECHO MEAS - AO MEAN PG: 6 MMHG
BH CV ECHO MEAS - AO ROOT DIAM: 3.8 CM
BH CV ECHO MEAS - AO V2 MAX: 161 CM/SEC
BH CV ECHO MEAS - AO V2 VTI: 26.6 CM
BH CV ECHO MEAS - AVA(I,D): 1.91 CM2
BH CV ECHO MEAS - EDV(CUBED): 43.9 ML
BH CV ECHO MEAS - EDV(MOD-SP2): 98 ML
BH CV ECHO MEAS - EDV(MOD-SP4): 115 ML
BH CV ECHO MEAS - EF(MOD-BP): 75 %
BH CV ECHO MEAS - EF(MOD-SP2): 75.5 %
BH CV ECHO MEAS - EF(MOD-SP4): 75.7 %
BH CV ECHO MEAS - ESV(CUBED): 22.7 ML
BH CV ECHO MEAS - ESV(MOD-SP2): 24 ML
BH CV ECHO MEAS - ESV(MOD-SP4): 28 ML
BH CV ECHO MEAS - FS: 19.8 %
BH CV ECHO MEAS - IVS/LVPW: 1.14 CM
BH CV ECHO MEAS - IVSD: 1.81 CM
BH CV ECHO MEAS - LAT PEAK E' VEL: 6.4 CM/SEC
BH CV ECHO MEAS - LV DIASTOLIC VOL/BSA (35-75): 55.8 CM2
BH CV ECHO MEAS - LV MASS(C)D: 240.3 GRAMS
BH CV ECHO MEAS - LV MAX PG: 3.8 MMHG
BH CV ECHO MEAS - LV MEAN PG: 2 MMHG
BH CV ECHO MEAS - LV SYSTOLIC VOL/BSA (12-30): 13.6 CM2
BH CV ECHO MEAS - LV V1 MAX: 97.3 CM/SEC
BH CV ECHO MEAS - LV V1 VTI: 16.2 CM
BH CV ECHO MEAS - LVIDD: 3.5 CM
BH CV ECHO MEAS - LVIDS: 2.8 CM
BH CV ECHO MEAS - LVOT AREA: 3.1 CM2
BH CV ECHO MEAS - LVOT DIAM: 2 CM
BH CV ECHO MEAS - LVPWD: 1.59 CM
BH CV ECHO MEAS - MED PEAK E' VEL: 4.2 CM/SEC
BH CV ECHO MEAS - MV A DUR: 0.11 SEC
BH CV ECHO MEAS - MV A MAX VEL: 95.5 CM/SEC
BH CV ECHO MEAS - MV DEC SLOPE: 632.2 CM/SEC2
BH CV ECHO MEAS - MV DEC TIME: 0.16 SEC
BH CV ECHO MEAS - MV E MAX VEL: 102 CM/SEC
BH CV ECHO MEAS - MV E/A: 1.07
BH CV ECHO MEAS - MV MAX PG: 4.5 MMHG
BH CV ECHO MEAS - MV MEAN PG: 2.7 MMHG
BH CV ECHO MEAS - MV P1/2T: 53 MSEC
BH CV ECHO MEAS - MV V2 VTI: 28.7 CM
BH CV ECHO MEAS - MVA(P1/2T): 4.2 CM2
BH CV ECHO MEAS - MVA(VTI): 1.77 CM2
BH CV ECHO MEAS - PA ACC TIME: 0.08 SEC
BH CV ECHO MEAS - PA V2 MAX: 108.6 CM/SEC
BH CV ECHO MEAS - PI END-D VEL: 192.1 CM/SEC
BH CV ECHO MEAS - PULM DIAS VEL: 90 CM/SEC
BH CV ECHO MEAS - PULM S/D: 0.67
BH CV ECHO MEAS - PULM SYS VEL: 60.3 CM/SEC
BH CV ECHO MEAS - QP/QS: 0.57
BH CV ECHO MEAS - RAP SYSTOLE: 8 MMHG
BH CV ECHO MEAS - RV MAX PG: 1.3 MMHG
BH CV ECHO MEAS - RV V1 MAX: 57 CM/SEC
BH CV ECHO MEAS - RV V1 VTI: 11.8 CM
BH CV ECHO MEAS - RVOT DIAM: 1.76 CM
BH CV ECHO MEAS - RVSP: 74 MMHG
BH CV ECHO MEAS - SV(LVOT): 50.7 ML
BH CV ECHO MEAS - SV(MOD-SP2): 74 ML
BH CV ECHO MEAS - SV(MOD-SP4): 87 ML
BH CV ECHO MEAS - SV(RVOT): 28.7 ML
BH CV ECHO MEAS - SVI(LVOT): 24.6 ML/M2
BH CV ECHO MEAS - SVI(MOD-SP2): 35.9 ML/M2
BH CV ECHO MEAS - SVI(MOD-SP4): 42.2 ML/M2
BH CV ECHO MEAS - TAPSE (>1.6): 1.75 CM
BH CV ECHO MEAS - TR MAX PG: 65.9 MMHG
BH CV ECHO MEAS - TR MAX VEL: 405.8 CM/SEC
BH CV ECHO MEASUREMENTS AVERAGE E/E' RATIO: 19.25
BH CV XLRA - TDI S': 10 CM/SEC
BUN SERPL-MCNC: 17 MG/DL (ref 8–23)
BUN/CREAT SERPL: 15.9 (ref 7–25)
CALCIUM SPEC-SCNC: 8.6 MG/DL (ref 8.6–10.5)
CHLORIDE SERPL-SCNC: 105 MMOL/L (ref 98–107)
CO2 SERPL-SCNC: 23.1 MMOL/L (ref 22–29)
CREAT SERPL-MCNC: 1.07 MG/DL (ref 0.76–1.27)
DEPRECATED RDW RBC AUTO: 44.3 FL (ref 37–54)
EGFRCR SERPLBLD CKD-EPI 2021: 72.4 ML/MIN/1.73
EOSINOPHIL # BLD AUTO: 0.29 10*3/MM3 (ref 0–0.4)
EOSINOPHIL NFR BLD AUTO: 3.8 % (ref 0.3–6.2)
ERYTHROCYTE [DISTWIDTH] IN BLOOD BY AUTOMATED COUNT: 12.7 % (ref 12.3–15.4)
GLUCOSE BLDC GLUCOMTR-MCNC: 103 MG/DL (ref 70–130)
GLUCOSE BLDC GLUCOMTR-MCNC: 152 MG/DL (ref 70–130)
GLUCOSE BLDC GLUCOMTR-MCNC: 184 MG/DL (ref 70–130)
GLUCOSE BLDC GLUCOMTR-MCNC: 213 MG/DL (ref 70–130)
GLUCOSE SERPL-MCNC: 105 MG/DL (ref 65–99)
HCT VFR BLD AUTO: 39.7 % (ref 37.5–51)
HGB BLD-MCNC: 13.1 G/DL (ref 13–17.7)
IMM GRANULOCYTES # BLD AUTO: 0.03 10*3/MM3 (ref 0–0.05)
IMM GRANULOCYTES NFR BLD AUTO: 0.4 % (ref 0–0.5)
LEFT ATRIUM VOLUME INDEX: 32.9 ML/M2
LYMPHOCYTES # BLD AUTO: 1.14 10*3/MM3 (ref 0.7–3.1)
LYMPHOCYTES NFR BLD AUTO: 14.8 % (ref 19.6–45.3)
MAGNESIUM SERPL-MCNC: 2 MG/DL (ref 1.6–2.4)
MCH RBC QN AUTO: 31.3 PG (ref 26.6–33)
MCHC RBC AUTO-ENTMCNC: 33 G/DL (ref 31.5–35.7)
MCV RBC AUTO: 95 FL (ref 79–97)
MONOCYTES # BLD AUTO: 0.88 10*3/MM3 (ref 0.1–0.9)
MONOCYTES NFR BLD AUTO: 11.4 % (ref 5–12)
NEUTROPHILS NFR BLD AUTO: 5.32 10*3/MM3 (ref 1.7–7)
NEUTROPHILS NFR BLD AUTO: 69 % (ref 42.7–76)
NRBC BLD AUTO-RTO: 0 /100 WBC (ref 0–0.2)
PHOSPHATE SERPL-MCNC: 3.1 MG/DL (ref 2.5–4.5)
PLATELET # BLD AUTO: 195 10*3/MM3 (ref 140–450)
PMV BLD AUTO: 10.4 FL (ref 6–12)
POTASSIUM SERPL-SCNC: 3.9 MMOL/L (ref 3.5–5.2)
RBC # BLD AUTO: 4.18 10*6/MM3 (ref 4.14–5.8)
SINUS: 3.2 CM
SODIUM SERPL-SCNC: 140 MMOL/L (ref 136–145)
STJ: 2.6 CM
WBC NRBC COR # BLD AUTO: 7.71 10*3/MM3 (ref 3.4–10.8)

## 2024-06-03 PROCEDURE — 93356 MYOCRD STRAIN IMG SPCKL TRCK: CPT

## 2024-06-03 PROCEDURE — 25510000001 PERFLUTREN (DEFINITY) 8.476 MG IN SODIUM CHLORIDE (PF) 0.9 % 10 ML INJECTION: Performed by: HOSPITALIST

## 2024-06-03 PROCEDURE — 93356 MYOCRD STRAIN IMG SPCKL TRCK: CPT | Performed by: INTERNAL MEDICINE

## 2024-06-03 PROCEDURE — 93306 TTE W/DOPPLER COMPLETE: CPT

## 2024-06-03 PROCEDURE — 82948 REAGENT STRIP/BLOOD GLUCOSE: CPT

## 2024-06-03 PROCEDURE — 25010000002 CEFEPIME PER 500 MG: Performed by: INTERNAL MEDICINE

## 2024-06-03 PROCEDURE — 83735 ASSAY OF MAGNESIUM: CPT | Performed by: HOSPITALIST

## 2024-06-03 PROCEDURE — 85025 COMPLETE CBC W/AUTO DIFF WBC: CPT | Performed by: HOSPITALIST

## 2024-06-03 PROCEDURE — 97530 THERAPEUTIC ACTIVITIES: CPT

## 2024-06-03 PROCEDURE — 63710000001 INSULIN LISPRO (HUMAN) PER 5 UNITS: Performed by: INTERNAL MEDICINE

## 2024-06-03 PROCEDURE — 25810000003 SODIUM CHLORIDE 0.9 % SOLUTION: Performed by: HOSPITALIST

## 2024-06-03 PROCEDURE — 93306 TTE W/DOPPLER COMPLETE: CPT | Performed by: INTERNAL MEDICINE

## 2024-06-03 PROCEDURE — 25010000002 ENOXAPARIN PER 10 MG: Performed by: INTERNAL MEDICINE

## 2024-06-03 PROCEDURE — 80069 RENAL FUNCTION PANEL: CPT | Performed by: HOSPITALIST

## 2024-06-03 PROCEDURE — 25010000002 VANCOMYCIN 10 G RECONSTITUTED SOLUTION: Performed by: HOSPITALIST

## 2024-06-03 PROCEDURE — 63710000001 INSULIN GLARGINE PER 5 UNITS: Performed by: HOSPITALIST

## 2024-06-03 PROCEDURE — 25010000002 PIPERACILLIN SOD-TAZOBACTAM PER 1 G: Performed by: HOSPITALIST

## 2024-06-03 RX ADMIN — ASPIRIN 81 MG: 81 TABLET, COATED ORAL at 20:16

## 2024-06-03 RX ADMIN — OLANZAPINE 5 MG: 5 TABLET, FILM COATED ORAL at 20:16

## 2024-06-03 RX ADMIN — HYDRALAZINE HYDROCHLORIDE 25 MG: 25 TABLET ORAL at 20:16

## 2024-06-03 RX ADMIN — INSULIN GLARGINE 20 UNITS: 100 INJECTION, SOLUTION SUBCUTANEOUS at 09:48

## 2024-06-03 RX ADMIN — CEFEPIME 2000 MG: 2 INJECTION, POWDER, FOR SOLUTION INTRAVENOUS at 20:16

## 2024-06-03 RX ADMIN — SENNOSIDES AND DOCUSATE SODIUM 2 TABLET: 50; 8.6 TABLET ORAL at 09:48

## 2024-06-03 RX ADMIN — PIPERACILLIN AND TAZOBACTAM 3.38 G: 3; .375 INJECTION, POWDER, FOR SOLUTION INTRAVENOUS at 09:48

## 2024-06-03 RX ADMIN — CEFEPIME 2000 MG: 2 INJECTION, POWDER, FOR SOLUTION INTRAVENOUS at 12:43

## 2024-06-03 RX ADMIN — Medication 10 ML: at 09:00

## 2024-06-03 RX ADMIN — BRIMONIDINE TARTRATE 1 DROP: 2 SOLUTION OPHTHALMIC at 09:55

## 2024-06-03 RX ADMIN — TERAZOSIN HYDROCHLORIDE 1 MG: 1 CAPSULE ORAL at 20:16

## 2024-06-03 RX ADMIN — LATANOPROST 1 DROP: 50 SOLUTION OPHTHALMIC at 20:18

## 2024-06-03 RX ADMIN — HYDRALAZINE HYDROCHLORIDE 25 MG: 25 TABLET ORAL at 15:15

## 2024-06-03 RX ADMIN — ENOXAPARIN SODIUM 40 MG: 100 INJECTION SUBCUTANEOUS at 09:48

## 2024-06-03 RX ADMIN — DULOXETINE HYDROCHLORIDE 30 MG: 30 CAPSULE, DELAYED RELEASE ORAL at 20:16

## 2024-06-03 RX ADMIN — BRIMONIDINE TARTRATE 1 DROP: 2 SOLUTION OPHTHALMIC at 20:18

## 2024-06-03 RX ADMIN — PIPERACILLIN AND TAZOBACTAM 3.38 G: 3; .375 INJECTION, POWDER, FOR SOLUTION INTRAVENOUS at 00:11

## 2024-06-03 RX ADMIN — INSULIN LISPRO 4 UNITS: 100 INJECTION, SOLUTION INTRAVENOUS; SUBCUTANEOUS at 18:14

## 2024-06-03 RX ADMIN — INSULIN LISPRO 2 UNITS: 100 INJECTION, SOLUTION INTRAVENOUS; SUBCUTANEOUS at 11:37

## 2024-06-03 RX ADMIN — VANCOMYCIN HYDROCHLORIDE 1500 MG: 10 INJECTION, POWDER, LYOPHILIZED, FOR SOLUTION INTRAVENOUS at 11:37

## 2024-06-03 RX ADMIN — PERFLUTREN 2 ML: 6.52 INJECTION, SUSPENSION INTRAVENOUS at 08:19

## 2024-06-03 NOTE — PLAN OF CARE
Goal Outcome Evaluation:  Plan of Care Reviewed With: spouse, patient        Progress: improving  Outcome Evaluation: pt seen in PM for OT, noted improvement in alertness and orientation. Confusion noted intermittently throughout session however easily redirected to task. He demo's improvements toward goals, LLE noted increased swelling, max A to don sock in supine. Mod A to come to sitting, SBA for sit balance throughout UE ther ex in elbow planes, [place] at times for technique required. STS this date with Tank, min A x1 with x1 person to assist with line mgment. He is able to demo short household distance to sinkside with rwx support, mild unsteadiness noted however improved use and support of rwx this date. He will continue to benefit from skilled OT to address functional deficits in overall strength, safety and activity tolerance with ADLs. Currently on 4L with spO2 above 90% throughout session. D/c plan as of now, home with spouse 24/7 assist, continue to progress as able.      Anticipated Discharge Disposition (OT): home with 24/7 care, home with assist

## 2024-06-03 NOTE — PROGRESS NOTES
PROGRESS NOTE      Patient Name: Dilip Verma  : 1950  MRN: [id number]  Primary Care Physician: Fernando Camargo DO  Date of admission: 2024    Patient Care Team:  Fernando Camargo DO as PCP - General (Family Medicine)  Morris Cai MD as Consulting Physician (Sleep Medicine)  Michaela Hylton MD as Consulting Physician (Cardiology)  Hardy Banerjee MD as Consulting Physician (Ophthalmology)  Chula Christianson MD as Consulting Physician (Ophthalmology)  Jason Sherman MD (Endocrinology)  Perla Mayen MD as Consulting Physician (Nephrology)  Federico Hebert MD as Consulting Physician (Pulmonary Disease)  Niraj Ravi MD as Consulting Physician (Orthopedic Surgery)  Papa Velasco MD as Consulting Physician (Urology)  Terese Yost MD as Consulting Physician (Gastroenterology)  Aracelis Ball MD as Consulting Physician (Colon and Rectal Surgery)  [facility] as Primary Care Provider        Reason for Follow up:     AMENA, CKD III      Subjective:     Seen and examined, no distress  sCr 1.07, UOP 1L    Review of systems:  Constitutional: Confusion, weakness.  HEENT:  No headache, otalgia, itchy eyes, nasal discharge or sore throat.  Cardiac:  No chest pain, dyspnea, orthopnea or PND.  Chest:              No cough, phlegm or wheezing.  Abdomen:  No abdominal pain, nausea or vomiting.  Neuro:  No focal weakness, abnormal movements orseizure like activity.  :   Dysuria  ROS was otherwise negative except as mentioned in the Teller.       Personal History:     Past Medical History:   Past Medical History:   Diagnosis Date    AMENA (acute kidney injury) 2020    Alcoholism 1989    not since     CORI positive     Anemia     Anxiety     Ataxia     Atypical chest pain 2022    SEEN AT [facility] ER    Balance disorder     Carotid stenosis 3/28/2024    Cataract     BILATERAL, S/P EXTRACTION    Cervical radiculopathy 2019    SEEN AT  [facility] ER     "Cervical spinal cord compression     Chronic diastolic (congestive) heart failure     Chronic kidney disease     STAGE 3, FOLLOWED BY DR. VIVAR    Chronic pancreatitis     Closed left subtrochanteric femur fracture 12/01/2020    ADMITTED TO [facility]    Closed nondisplaced intertrochanteric fracture of left femur 12/01/2020    ADMITTED TO [facility]    Colon polyps     FOLLOWED BY DR. AVTAR JEONG    Constipation     Contracture, right hand     Coronary artery disease     CABG 7/2019    COVID-19 07/2022    DDD (degenerative disc disease), cervical     Diabetes mellitus, type II     IDDM    Diabetic retinopathy     Difficulty walking     Dysphagia     Elevated brain natriuretic peptide (BNP) level 08/2014    Elevated LFTs 03/2021    Erectile dysfunction     Fissure, anal 2022    Foot drop     Fuchs' corneal dystrophy of right eye     Glaucoma     BILATERAL    History of alcohol abuse     Hyperlipidemia     Hypersomnia     Hypertension     Hypertensive urgency 02/25/2020    ADMITTED TO [facility]    Insomnia     Kidney stones     LV dysfunction 06/2016    Lyme disease     Lymphadenopathy syndrome 05/2021    Macular edema     BILATERAL    Myocardial infarction 11/05/2019    NSTEMI, ADMITTED TO [facility]    Myocardial infarction 07/12/2019    NSTEMI, ADMITTED TO [facility]    Neuropathy in diabetes     Non-celiac gluten sensitivity     Orthostasis     Osteoporosis     Oxygen dependent     PAD (peripheral artery disease)     PNA (pneumonia) 06/2016    LEFT LOBE    Polyneuropathy     PTSD (post-traumatic stress disorder)     Pulmonary hypertension     Pulmonary nodule     Senile ectropion of both lower eyelids 02/2022    Sepsis 12/16/2020    D/T UTI, ADMITTED TO [facility]    Sleep apnea     STATES DOESN'T USE BIPAP OR CPAP    Spinal stenosis in cervical region     SEVERE-LIMITED ROM    Stroke 2012    \"slight stroke\"    Syncope and collapse 07/06/2019    ADMITTED TO [facility]    Urinary retention 04/05/2021    SEEN AT [facility] ER    Vision loss     Vitamin D " deficiency        Surgical History:      Past Surgical History:   Procedure Laterality Date    CARDIAC CATHETERIZATION N/A 07/15/2019    Procedure: Coronary angiography;  Surgeon: Carrie Price MD;  Location:  [facility];  Service: Cardiovascular    CARDIAC CATHETERIZATION N/A 07/15/2019    Procedure: Left Heart Cath;  Surgeon: Carrie Price MD;  Location:  [facility];  Service: Cardiovascular    CARDIAC CATHETERIZATION N/A 07/15/2019    Procedure: Left ventriculography;  Surgeon: Carrie Price MD;  Location:  [facility];  Service: Cardiovascular    CARDIAC CATHETERIZATION  07/15/2019    Procedure: Functional Flow Tyro;  Surgeon: Carrie Price MD;  Location:  [facility];  Service: Cardiovascular    CARDIAC CATHETERIZATION N/A 11/06/2019    Procedure: Right and Left Heart Cath;  Surgeon: Mya Smith MD;  Location:  [facility];  Service: Cardiovascular    CARDIAC CATHETERIZATION N/A 11/06/2019    Procedure: Coronary angiography;  Surgeon: Mya Smith MD;  Location: [facility] CATH [facility]VASIVE LOC[facility];  Service: Cardiovascular    CARDIAC SURGERY      CATARACT EXTRACTION Left 2014    CATARACT EXTRACTION Right 11/2016    PHACO/IOL, DR. LEN DENTON    COLONOSCOPY N/A 03/16/2023    ENTIRE COLON WNL, RESCOPE IN 5 YRS, DR. LINDA LIZARRAGA AT [facility]    COLONOSCOPY W/ POLYPECTOMY N/A 01/02/2015    A FEW DIVERTICULA IN SIGMOID, 6 MM TUBULOVILLOUS ADENOMA POLYP IN RECTUM, SMALL HEMORRHOIDS, MELANOSIS COLI, DR. AVTAR JEONG AT [place] [facility]    CORONARY ARTERY BYPASS GRAFT N/A 07/18/2019    Procedure: INTRAOPERATIVE SHOAIB; STERNOTOMY CORONARY ARTERY BYPASS x 3  USING LEFT INTERNAL MAMMARY ARTERY GRAFT UTILIZING ENDOSCOPICALLY HARVESTED RIGHT GREATER SAPHENOUS VEIN AND PRP.;  Surgeon: Bill Devi MD;  Location: [facility] MAIN OR;  Service: Cardiothoracic    CYSTOSCOPY BLADDER STONE LITHOTRIPSY N/A     DENTAL PROCEDURE  Bilateral     3 surgeries inder implants    FEMUR IM NAILING/RODDING Left 12/03/2020    Procedure: LEFT HIP INTRAMEDULLARY NAIL;  Surgeon: Niraj Ravi MD;  Location: [facility] OR;  Service: Orthopedic Spine;  Laterality: Left;    INCISION AND DRAINAGE PERIRECTAL ABSCESS N/A 10/04/2023    Procedure: Incision and drainage of perianal and buttock abscess;  Surgeon: Herbie Villatoro MD;  Location: [facility] OR;  Service: General;  Laterality: N/A;    INGUINAL HERNIA REPAIR Bilateral     TOENAIL EXCISION  06/2022    TONSILLECTOMY Bilateral     TOTAL HIP ARTHROPLASTY REVISION Left 01/11/2022    Procedure: TOTAL HIP ARTHROPLASTY REVISION- POSTERIOR;  Surgeon: Jurgen Candelaria II, MD;  Location: [facility] OR;  Service: Orthopedics;  Laterality: Left;    VASECTOMY N/A        Family History: family history includes Alcohol abuse in his brother and paternal grandfather; Arrhythmia in his mother; Cancer in his mother; Depression in his mother and sister; Glaucoma in his maternal grandmother; Heart attack in his paternal grandmother; Heart disease in his father and mother; Heart failure in his father; Hyperlipidemia in his father and mother; Hypertension in his father and mother; Kidney disease in his father; Lung disease in his paternal uncle; Mental illness in his mother; Migraines in his mother; Stroke in his maternal grandmother and paternal grandmother; Thyroid disease in his father and paternal uncle; Uterine cancer in his mother. Otherwise pertinent FHx was reviewed and unremarkable.     Social History:  reports that he quit smoking about 24 years ago. His smoking use included cigarettes. He started smoking about 40 years ago. He has a 12 pack-year smoking history. He has been exposed to tobacco smoke. He has never used smokeless tobacco. He reports that he does not currently use alcohol. He reports that he does not use drugs.    Medications:  Prior to Admission medications    Medication Sig Start  Date End Date Taking? Authorizing Provider   aspirin 81 MG EC tablet Take 1 tablet by mouth Every Night.   Yes Heri Rinaldi MD   brimonidine (ALPHAGAN) 0.2 % ophthalmic solution Administer 1 drop to both eyes 2 (Two) Times a Day.   Yes Heri Rinaldi MD   bumetanide (BUMEX) 1 MG tablet Take 1 tablet by mouth Daily.   Yes Heri Rinaldi MD   Cholecalciferol (Vitamin D-3) 125 MCG (5000 UT) tablet Take 1 tablet by mouth Daily.   Yes Heri Rinaldi MD   DULoxetine (CYMBALTA) 30 MG capsule Take 1 capsule by mouth Every Night. 1/16/24  Yes Heri Rinaldi MD   Insulin Glargine, 2 Unit Dial, (TOUJEO) 300 UNIT/ML solution pen-injector injection Inject 24 Units under the skin into the appropriate area as directed Daily.   Yes Heri Rinaldi MD   insulin lispro (humaLOG) 100 UNIT/ML injection Inject 0-14 Units under the skin into the appropriate area as directed 3 (Three) Times a Day Before Meals.  Patient taking differently: Inject 0-14 Units under the skin into the appropriate area as directed 3 (Three) Times a Day Before Meals. SLIDING SCALE  100-150 - 4 units  151-200 - 5 units  201-250 - 6 units  251-300 - 7 units  301-350 - 8 units  351-400 - 9 units  401+ - 10 units 12/7/20  Yes Amador Valverde MD   latanoprost (XALATAN) 0.005 % ophthalmic solution Administer 1 drop to both eyes every night at bedtime. 1/16/23  Yes Heri Rinaldi MD   multivitamin with minerals (MULTIVITAMIN ADULTS PO) Take 1 tablet by mouth Daily.   Yes Heri Rinaldi MD   testosterone (ANDROGEL) 25 MG/2.5GM (1%) gel gel Place 25 mg on the skin as directed by provider 2 (Two) Times a Week.   Yes Heri Rinaldi MD   traMADol (ULTRAM) 50 MG tablet Take 1 tablet by mouth At Night As Needed. 10/16/23  Yes Heri Rinaldi MD   Magnesium 400 MG capsule Take 400 mg by mouth As Needed.    Hrei Rinaldi MD   sildenafil (REVATIO) 20 MG tablet Take 1 tablet by mouth As Needed.     Provider, MD Heri     Scheduled Meds:aspirin, 81 mg, Oral, Daily  brimonidine, 1 drop, Both Eyes, BID  bumetanide, 1 mg, Oral, Daily  cefepime, 2,000 mg, Intravenous, Q8H  DULoxetine, 30 mg, Oral, Daily  enoxaparin, 40 mg, Subcutaneous, Daily  hydrALAZINE, 25 mg, Oral, Q8H  insulin glargine, 20 Units, Subcutaneous, Daily  insulin lispro, 2-9 Units, Subcutaneous, 4x Daily AC & at Bedtime  latanoprost, 1 drop, Both Eyes, Nightly  OLANZapine, 5 mg, Oral, Nightly  senna-docusate sodium, 2 tablet, Oral, BID  sodium chloride, 10 mL, Intravenous, Q12H  terazosin, 1 mg, Oral, Nightly  vancomycin, 1,500 mg, Intravenous, Q24H      Continuous Infusions:Pharmacy to dose vancomycin,       PRN Meds:  acetaminophen **OR** acetaminophen **OR** acetaminophen    senna-docusate sodium **AND** polyethylene glycol **AND** bisacodyl **AND** bisacodyl    dextrose    dextrose    glucagon (human recombinant)    nitroglycerin    OLANZapine    ondansetron    Pharmacy to dose vancomycin    sodium chloride    sodium chloride    traMADol  Allergies:    Allergies   Allergen Reactions    Ace Inhibitors Angioedema    Angiotensin Receptor Blockers Angioedema and Unknown (See Comments)     Angioneurotic edema    Dapagliflozin Other (See Comments)     UTI,  rectal abcess    Losartan Angioedema     Angioneurotic edema    Seroquel [Quetiapine] Hallucinations    Xanax [Alprazolam] Unknown - High Severity    Amlodipine Swelling    Ativan [Lorazepam] Hallucinations    Baclofen Hallucinations    Misc. Sulfonamide Containing Compounds Unknown (See Comments)    Minoxidil Confusion    Tetracycline Rash     bliisters in mouth         Objective   Exam:     Vital Signs  Temp:  [98.2 °F (36.8 °C)-98.4 °F (36.9 °C)] 98.4 °F (36.9 °C)  Heart Rate:  [75-91] 86  Resp:  [18] 18  BP: (162-190)/(76-94) 190/94  SpO2:  [91 %-98 %] 94 %  on  Flow (L/min):  [4] 4;   Device (Oxygen Therapy): room air  Body mass index is 31.02 kg/m².  EXAM  General:  Chronically ill  appearing male in no acute distress.    Head:      Normocephalic and atraumatic.    Eyes:      PERRL/EOM intact, conjunctivae and sclerae clear without nystagmus.    Neck:      No masses, thyromegaly,  trachea central   Lungs:    Clear bilaterally to auscultation.    Heart:      Regular rate and rhythm, no murmur no gallop  Abd:        Soft, nontender, not distended, bowel sounds positive, no shifting dullness.  Msk:        No deformity or scoliosis noted of thoracic or lumbar spine.    Pulses:   Pulses normal in all 4 extremities.    Extremities:        No cyanosis or clubbing- no edema.    Neuro:    No focal deficits.   alert oriented x3  Skin:       Intact without lesions or rashes.    Psych:    Alert and cooperative; normal mood and affect; normal attention span       Results Review:  I have personally reviewed most recent Data :  [facility]  CBC    Results from last 7 days   Lab Units 06/03/24  0608 06/02/24  0752 06/01/24  0551 05/31/24  0620 05/30/24  0433   WBC 10*3/mm3 7.71 7.94 8.26 14.38* 30.36*   HEMOGLOBIN g/dL 13.1 13.0 13.2 13.4 16.4   PLATELETS 10*3/mm3 195 204 168 184 230     CMP   Results from last 7 days   Lab Units 06/03/24  0608 06/02/24  0752 06/01/24  0551 05/31/24  0620 05/30/24  0955 05/30/24  0433   SODIUM mmol/L 140 141 138 137 140 138   POTASSIUM mmol/L 3.9 4.2 4.5 4.4 4.0 5.5*   CHLORIDE mmol/L 105 107 106 102 107 98   CO2 mmol/L 23.1 24.0 23.4 27.0 24.6 29.2*   BUN mg/dL 17 23 31* 32* 22 24*   CREATININE mg/dL 1.07 0.94 1.22 1.51* 0.90 1.17   GLUCOSE mg/dL 105* 74 166* 97 228* 306*   ALBUMIN g/dL 3.2* 3.2*  --  3.3*  --  4.2   BILIRUBIN mg/dL  --   --   --  0.6  --  1.3*   ALK PHOS U/L  --   --   --  60  --  86   AST (SGOT) U/L  --   --   --  30  --  33   ALT (SGPT) U/L  --   --   --  18  --  31   AMMONIA umol/L  --   --   --   --   --  20     ABG    Results from last 7 days   Lab Units 05/30/24  0505   PH, ARTERIAL pH units 7.420   PCO2, ARTERIAL mm Hg 44.0   PO2 ART  mm Hg 56.9*   O2 SATURATION ART % 89.5*   BASE EXCESS ART mmol/L 3.3*     CT Head Without Contrast    Result Date: 6/3/2024   No acute process identified.    Radiation dose reduction techniques were utilized, including automated exposure control and exposure modulation based on body size.   This report was finalized on 6/3/2024 9:52 AM by Dr. Tad Godfrey M.D on Workstation: [id number]      US Renal Bilateral    Result Date: 5/31/2024  Thickened urinary bladder wall. Low volume bladder. No hydronephrosis.  This report was finalized on 5/31/2024 6:25 PM by Dr. Zechariah Falcon M.D on Workstation: [id number]      CT Abdomen Pelvis With Contrast    Result Date: 5/30/2024   1. Diffuse urinary bladder wall thickening and mild perivesical fat stranding, which could be accentuated by under distention but could reflect a nonspecific cystitis but recommend correlating with urinalysis. 2. Low lung volumes with multifocal bilateral perihilar and bibasilar opacities that likely represent subsegmental atelectasis and/or scarring. 3. Likely benign 9 mm subpleural nodule in the base of the lingula and likely reactive mediastinal and bilateral hilar lymphadenopathy given the long-term stability. 4. Nonspecific fat stranding in the left inguinal region. Correlate for recent trauma or instrumentation. 5. Chronic appearing splenic vein thrombosis with peripheral calcification and multiple venous collaterals in the left upper quadrant. Additional pancreatic/peripancreatic calcifications could reflect sequela of chronic pancreatitis. 6. Mildly heterogeneous hepatic attenuation with subtle nodular liver contours, which could reflect underlying hepatocellular disease/cirrhosis. 7. Tiny hiatal hernia with mild nonspecific thickening of the distal esophageal wall and mild gastric wall thickening that could be accentuated by under distention. Correlate for GERD and possible gastritis.  This report was finalized on 5/30/2024 7:35 AM by  Julio Thomas MD on Workstation: [id number]      CT Chest With Contrast Diagnostic    Result Date: 5/30/2024   1. Diffuse urinary bladder wall thickening and mild perivesical fat stranding, which could be accentuated by under distention but could reflect a nonspecific cystitis but recommend correlating with urinalysis. 2. Low lung volumes with multifocal bilateral perihilar and bibasilar opacities that likely represent subsegmental atelectasis and/or scarring. 3. Likely benign 9 mm subpleural nodule in the base of the lingula and likely reactive mediastinal and bilateral hilar lymphadenopathy given the long-term stability. 4. Nonspecific fat stranding in the left inguinal region. Correlate for recent trauma or instrumentation. 5. Chronic appearing splenic vein thrombosis with peripheral calcification and multiple venous collaterals in the left upper quadrant. Additional pancreatic/peripancreatic calcifications could reflect sequela of chronic pancreatitis. 6. Mildly heterogeneous hepatic attenuation with subtle nodular liver contours, which could reflect underlying hepatocellular disease/cirrhosis. 7. Tiny hiatal hernia with mild nonspecific thickening of the distal esophageal wall and mild gastric wall thickening that could be accentuated by under distention. Correlate for GERD and possible gastritis.  This report was finalized on 5/30/2024 7:35 AM by Julio Thomas MD on Workstation: [id number]      XR Chest 1 View    Result Date: 5/30/2024  Nonspecific bibasilar consolidation.  This report was finalized on 5/30/2024 4:47 AM by Dr. Gayle Okeefe M.D on Workstation: [id number]       Results for orders placed during the hospital encounter of 05/30/24    Adult Transthoracic Echo Complete W/ Cont if Necessary Per Protocol    Interpretation Summary    Left ventricular systolic function is hyperdynamic (EF > 70%). Calculated left ventricular EF = 75% Global longitudinal LV strain (GLS) = -17.8%. Left ventricle  strain data was reviewed by the physician and found to be accurate. Global longitudinal LV strain is normal however there is regional abnormality with apical sparing suggestive of amyloid heart disease. However there is also basal ptal hypertrophy suggestive of hypertrophic cardiomyopathy. Wall motion abnormality is also noted in the basal septum and cannot rule out ischemic component.Consider additional imaging such as cardiac MRI and further evaluation also for amyloid heart disease as clinically indicated. The left ventricular cavity is small in size. Left ventricular wall thickness is consistent with moderate to severe septal asymmetric hypertrophy. There is hypokinesis of the left ventricular basal septum. Left ventricular diastolic function is consistent with (grade II w/high LAP) pseudonormalization.    The left atrial cavity is mildly dilated.    No aortic valve regurgitation or stenosis is present. The aortic valve is abnormal in structure. There is mild thickening of the aortic valve.    There is mild, bileaflet mitral valve thickening present. Trace mitral valve regurgitation is present. No significant mitral valve stenosis is present.    Moderate tricuspid valve regurgitation is present. Estimated right ventricular systolic pressure from tricuspid regurgitation is markedly elevated (>55 mmHg). Calculated right ventricular systolic pressure from tricuspid regurgitation is 74 mmHg.        Assessment & Plan   Assessment and Plan:         Acute UTI (urinary tract infection)    Type 2 diabetes mellitus with hyperglycemia, with long-term current use of insulin    CAD (coronary artery disease)    Hx of CABG    Chronic diastolic heart failure    CSA (central sleep apnea)    HTN (hypertension)    History of stroke    CKD (chronic kidney disease) stage 3, GFR 30-59 ml/min    Peripheral arterial disease    Sepsis    Hyperkalemia    Metabolic encephalopathy    AMENA (acute kidney injury)    ASSESSMENT:  AMENA likely  prerenal ATN 2/2 urosepsis with hemodynamic changes; on CKD III etiology likely diabetic and hypertensive nephropathy with baseline sCr ~0.9-1.2 mg/dL  Hyperkalemia, now resolved  UTI, urine cultures with gram neg bacilli  Leukocytosis  Metabolic encephalopathy  PAD  Hx stroke  HTN  CHF  CAD  DM2  Chronic splenic vein thrombosis  Possible hepatocellular disease/cirrhosis  Likely benign subpleural nodule with bilateral lymphadenopathy  Hiatal hernia    Last TTE 10/5/22 with EF 66%, grade II DD    PLAN :     AMENA likely prerenal ATN 2/2 urosepsis with hemodynamic changes; on CKD III etiology likely diabetic and hypertensive nephropathy with baseline sCr ~0.9-1.2 mg/dL  Renal function stable and back to baseline  On Bumex 1mg PO daily, continue  Volume with CXR with bibasilar consolidation, on NC4L, LE edema, will likely need to restart bumex once renal function improves  Electrolytes, acid/base acceptable  UTI on empiric antibiotics per primary, urine cultures with gram neg bacilli  BC NGTD  BP trends acceptable  Strict I&O's  Avoid NSAIDs, nephrotoxic agents  We will follow and coordinate with team      Perla Mayen MD  [facility]  6/3/2024  15:22 EDT

## 2024-06-03 NOTE — PLAN OF CARE
Problem: Adult Inpatient Plan of Care  Goal: Plan of Care Review  Outcome: Ongoing, Progressing  Flowsheets (Taken 6/3/2024 0422)  Progress: no change  Plan of Care Reviewed With:   patient   spouse  Outcome Evaluation:   No s/sx of distress noted at this time. Rested some throughout night. Vital signs WDL. Patient alert to self and time. 02@4L NC   tolerated. Fall precautions maintained. Up with assist x2 to [facility]. IV antibiotics cont per orders. Will cont with current plan of care.  Goal: Patient-Specific Goal (Individualized)  Outcome: Ongoing, Progressing  Goal: Absence of Hospital-Acquired Illness or Injury  Outcome: Ongoing, Progressing  Intervention: Identify and Manage Fall Risk  Recent Flowsheet Documentation  Taken 6/3/2024 0225 by Juliette Cleaning, RN  Safety Promotion/Fall Prevention:   activity supervised   assistive device/personal items within reach   clutter free environment maintained   fall prevention program maintained   lighting adjusted   nonskid shoes/slippers when out of bed   room organization consistent   safety round/check completed  Taken 6/3/2024 0011 by Juliette Cleaning, RN  Safety Promotion/Fall Prevention:   activity supervised   assistive device/personal items within reach   clutter free environment maintained   fall prevention program maintained   lighting adjusted   nonskid shoes/slippers when out of bed   room organization consistent   safety round/check completed  Taken 6/2/2024 2214 by Juliette Cleaning, RN  Safety Promotion/Fall Prevention:   activity supervised   assistive device/personal items within reach   clutter free environment maintained   fall prevention program maintained   lighting adjusted   nonskid shoes/slippers when out of bed   room organization consistent   safety round/check completed  Taken 6/2/2024 2024 by Juliette Cleaning, RN  Safety Promotion/Fall Prevention:   activity supervised   assistive device/personal items within reach   clutter free environment maintained   fall  prevention program maintained   lighting adjusted   nonskid shoes/slippers when out of bed   room organization consistent   safety round/check completed  Intervention: Prevent Skin Injury  Recent Flowsheet Documentation  Taken 6/3/2024 0225 by Juliette Cleaning RN  Body Position: supine  Taken 6/3/2024 0011 by Juliette Cleaning RN  Body Position: supine, legs elevated  Taken 6/2/2024 2024 by Juliette Cleaning RN  Skin Protection:   adhesive use limited   incontinence pads utilized   transparent dressing maintained   tubing/devices free from skin contact  Intervention: Prevent and Manage VTE (Venous Thromboembolism) Risk  Recent Flowsheet Documentation  Taken 6/2/2024 2214 by Juliette Cleaning RN  Activity Management: up in chair  Taken 6/2/2024 2024 by Juliette Cleaning RN  Activity Management: up in chair  VTE Prevention/Management: (lovenox) other (see comments)  Intervention: Prevent Infection  Recent Flowsheet Documentation  Taken 6/3/2024 0225 by Juliette Cleaning RN  Infection Prevention:   hand hygiene promoted   rest/sleep promoted   single patient room provided  Taken 6/3/2024 0011 by Juliette Cleaning RN  Infection Prevention:   hand hygiene promoted   rest/sleep promoted   single patient room provided  Taken 6/2/2024 2214 by Juliette Cleaning RN  Infection Prevention:   hand hygiene promoted   rest/sleep promoted   single patient room provided  Taken 6/2/2024 2024 by Juliette Cleaning RN  Infection Prevention:   hand hygiene promoted   rest/sleep promoted   single patient room provided  Goal: Optimal Comfort and Wellbeing  Outcome: Ongoing, Progressing  Intervention: Provide Person-Centered Care  Recent Flowsheet Documentation  Taken 6/2/2024 2024 by Juliette Cleaning RN  Trust Relationship/Rapport:   care explained   reassurance provided   thoughts/feelings acknowledged  Goal: Readiness for Transition of Care  Outcome: Ongoing, Progressing     Problem: Diabetes Comorbidity  Goal: Blood Glucose Level Within Targeted Range  Outcome: Ongoing,  Progressing     Problem: Heart Failure Comorbidity  Goal: Maintenance of Heart Failure Symptom Control  Outcome: Ongoing, Progressing  Intervention: Maintain Heart Failure-Management  Recent Flowsheet Documentation  Taken 6/3/2024 0225 by Juliette Cleaning RN  Medication Review/Management: medications reviewed  Taken 6/3/2024 0011 by Juliette Cleaning RN  Medication Review/Management: medications reviewed  Taken 6/2/2024 2214 by Juliette Cleaning RN  Medication Review/Management: medications reviewed  Taken 6/2/2024 2024 by Juliette Cleaning RN  Medication Review/Management: medications reviewed     Problem: Hypertension Comorbidity  Goal: Blood Pressure in Desired Range  Outcome: Ongoing, Progressing  Intervention: Maintain Blood Pressure Management  Recent Flowsheet Documentation  Taken 6/3/2024 0225 by Juliette Cleaning RN  Medication Review/Management: medications reviewed  Taken 6/3/2024 0011 by Juliette Cleaning RN  Medication Review/Management: medications reviewed  Taken 6/2/2024 2214 by Juliette Cleaning RN  Medication Review/Management: medications reviewed  Taken 6/2/2024 2024 by Juliette Cleaning RN  Medication Review/Management: medications reviewed     Problem: Obstructive Sleep Apnea Risk or Actual Comorbidity Management  Goal: Unobstructed Breathing During Sleep  Outcome: Ongoing, Progressing     Problem: Skin Injury Risk Increased  Goal: Skin Health and Integrity  Outcome: Ongoing, Progressing  Intervention: Optimize Skin Protection  Recent Flowsheet Documentation  Taken 6/3/2024 0225 by Juliette Cleaning RN  Head of Bed (HOB) Positioning: HOB at 30 degrees  Taken 6/2/2024 2024 by Juliette Cleaning RN  Pressure Reduction Techniques:   frequent weight shift encouraged   weight shift assistance provided  Pressure Reduction Devices: alternating pressure pump (ADD)  Skin Protection:   adhesive use limited   incontinence pads utilized   transparent dressing maintained   tubing/devices free from skin contact     Problem: Fall Injury Risk  Goal:  Absence of Fall and Fall-Related Injury  Outcome: Ongoing, Progressing  Intervention: Identify and Manage Contributors  Recent Flowsheet Documentation  Taken 6/3/2024 0225 by Juliette Cleaning RN  Medication Review/Management: medications reviewed  Taken 6/3/2024 0011 by Juliette Cleaning RN  Medication Review/Management: medications reviewed  Taken 6/2/2024 2214 by Juliette Cleaning RN  Medication Review/Management: medications reviewed  Taken 6/2/2024 2024 by Juliette Cleaning RN  Medication Review/Management: medications reviewed  Intervention: Promote Injury-Free Environment  Recent Flowsheet Documentation  Taken 6/3/2024 0225 by Juliette Cleaning RN  Safety Promotion/Fall Prevention:   activity supervised   assistive device/personal items within reach   clutter free environment maintained   fall prevention program maintained   lighting adjusted   nonskid shoes/slippers when out of bed   room organization consistent   safety round/check completed  Taken 6/3/2024 0011 by Juliette Cleaning RN  Safety Promotion/Fall Prevention:   activity supervised   assistive device/personal items within reach   clutter free environment maintained   fall prevention program maintained   lighting adjusted   nonskid shoes/slippers when out of bed   room organization consistent   safety round/check completed  Taken 6/2/2024 2214 by Juliette Cleaning RN  Safety Promotion/Fall Prevention:   activity supervised   assistive device/personal items within reach   clutter free environment maintained   fall prevention program maintained   lighting adjusted   nonskid shoes/slippers when out of bed   room organization consistent   safety round/check completed  Taken 6/2/2024 2024 by Juliette Cleaning, LUC  Safety Promotion/Fall Prevention:   activity supervised   assistive device/personal items within reach   clutter free environment maintained   fall prevention program maintained   lighting adjusted   nonskid shoes/slippers when out of bed   room organization consistent    safety round/check completed   Goal Outcome Evaluation:  Plan of Care Reviewed With: patient, spouse        Progress: no change  Outcome Evaluation: No s/sx of distress noted at this time. Rested some throughout night. Vital signs WDL. Patient alert to self and time. 02@4L NC; tolerated. Fall precautions maintained. Up with assist x2 to [facility]. IV antibiotics cont per orders. Will cont with current plan of care.

## 2024-06-03 NOTE — THERAPY TREATMENT NOTE
Patient Name: Dilip Verma  : 1950    MRN: [id number]                              Today's Date: 6/3/2024       Admit Date: 2024    Visit Dx:     ICD-10-CM ICD-9-CM   1. Acute respiratory failure with hypoxia  J96.01 518.81   2. Sepsis without acute organ dysfunction, due to unspecified organism  A41.9 038.9     995.91   3. Hyperkalemia  E87.5 276.7   4. Metabolic encephalopathy  G93.41 348.31   5. Acute UTI  N39.0 599.0   6. Hyperglycemia due to diabetes mellitus  E11.65 250.02     Patient Active Problem List   Diagnosis    Anxiety    Colon polyp    Type 2 diabetes mellitus with hyperglycemia, with long-term current use of insulin    Erectile dysfunction    Hyperlipidemia    Type 2 acute myocardial infarction    CAD (coronary artery disease)    Hx of CABG    Chronic diastolic heart failure    Hypersomnia due to medical condition    CSA (central sleep apnea)    Periodic breathing    HTN (hypertension)    History of stroke    CKD (chronic kidney disease) stage 3, GFR 30-59 ml/min    Urinary retention    Acute on chronic renal failure    Acute UTI (urinary tract infection)    Acute on chronic diastolic (congestive) heart failure    Bacteriuria    Rectal pain    Status post total replacement of hip    Vitamin D deficiency    Post-acute COVID-19 syndrome    Insulin dose changed    Arthropathy associated with neurological disorder    Personal history of colonic polyps    Type 2 diabetes mellitus with diabetic neuropathy, with long-term current use of insulin    Cervical spinal stenosis    Abscess of skin    Overweight    Cervical stenosis of spine    Spinal stenosis, lumbar region, without neurogenic claudication    Medically noncompliant    Chronic heart failure with preserved ejection fraction (HFpEF)    Carotid stenosis    Peripheral arterial disease    Sepsis    Hyperkalemia    Metabolic encephalopathy    AMENA (acute kidney injury)     Past Medical History:   Diagnosis Date    AMENA (acute kidney injury)  12/03/2020    Alcoholism 1989    not since 1998    CORI positive     Anemia     Anxiety     Ataxia     Atypical chest pain 04/19/2022    SEEN AT [facility]    Balance disorder     Carotid stenosis 3/28/2024    Cataract     BILATERAL, S/P EXTRACTION    Cervical radiculopathy 11/11/2019    SEEN AT  [facility]    Cervical spinal cord compression     Chronic diastolic (congestive) heart failure     Chronic kidney disease     STAGE 3, FOLLOWED BY DR. VIVAR    Chronic pancreatitis     Closed left subtrochanteric femur fracture 12/01/2020    ADMITTED TO [facility]    Closed nondisplaced intertrochanteric fracture of left femur 12/01/2020    ADMITTED TO [facility]    Colon polyps     FOLLOWED BY DR. AVTAR JEONG    Constipation     Contracture, right hand     Coronary artery disease     CABG 7/2019    COVID-19 07/2022    DDD (degenerative disc disease), cervical     Diabetes mellitus, type II     IDDM    Diabetic retinopathy     Difficulty walking     Dysphagia     Elevated brain natriuretic peptide (BNP) level 08/2014    Elevated LFTs 03/2021    Erectile dysfunction     Fissure, anal 2022    Foot drop     Fuchs' corneal dystrophy of right eye     Glaucoma     BILATERAL    History of alcohol abuse     Hyperlipidemia     Hypersomnia     Hypertension     Hypertensive urgency 02/25/2020    ADMITTED TO [facility]    Insomnia     Kidney stones     LV dysfunction 06/2016    Lyme disease     Lymphadenopathy syndrome 05/2021    Macular edema     BILATERAL    Myocardial infarction 11/05/2019    NSTEMI, ADMITTED TO [facility]    Myocardial infarction 07/12/2019    NSTEMI, ADMITTED TO [facility]    Neuropathy in diabetes     Non-celiac gluten sensitivity     Orthostasis     Osteoporosis     Oxygen dependent     PAD (peripheral artery disease)     PNA (pneumonia) 06/2016    LEFT LOBE    Polyneuropathy     PTSD (post-traumatic stress disorder)     Pulmonary hypertension     Pulmonary nodule     Senile ectropion of both lower eyelids 02/2022    Sepsis 12/16/2020    D/T UTI,  "ADMITTED TO [facility]    Sleep apnea     STATES DOESN'T USE BIPAP OR CPAP    Spinal stenosis in cervical region     SEVERE-LIMITED ROM    Stroke 2012    \"slight stroke\"    Syncope and collapse 07/06/2019    ADMITTED TO [place]    Urinary retention 04/05/2021    SEEN AT [facility] ER    Vision loss     Vitamin D deficiency      Past Surgical History:   Procedure Laterality Date    CARDIAC CATHETERIZATION N/A 07/15/2019    Procedure: Coronary angiography;  Surgeon: Carrie Price MD;  Location:  [facility];  Service: Cardiovascular    CARDIAC CATHETERIZATION N/A 07/15/2019    Procedure: Left Heart Cath;  Surgeon: Carrie Price MD;  Location:  [facility];  Service: Cardiovascular    CARDIAC CATHETERIZATION N/A 07/15/2019    Procedure: Left ventriculography;  Surgeon: Carrie Price MD;  Location:  [facility];  Service: Cardiovascular    CARDIAC CATHETERIZATION  07/15/2019    Procedure: Functional Flow [place];  Surgeon: Carrie Price MD;  Location:  [facility];  Service: Cardiovascular    CARDIAC CATHETERIZATION N/A 11/06/2019    Procedure: Right and Left Heart Cath;  Surgeon: Mya Smith MD;  Location:  [facility];  Service: Cardiovascular    CARDIAC CATHETERIZATION N/A 11/06/2019    Procedure: Coronary angiography;  Surgeon: Mya Smith MD;  Location:  [facility];  Service: Cardiovascular    CARDIAC SURGERY      CATARACT EXTRACTION Left 2014    CATARACT EXTRACTION Right 11/2016    PHACO/IOL, DR. LEN DENTON    COLONOSCOPY N/A 03/16/2023    ENTIRE COLON WNL, RESCOPE IN 5 YRS, DR. LINDA LIZARRAGA AT [facility]    COLONOSCOPY W/ POLYPECTOMY N/A 01/02/2015    A FEW DIVERTICULA IN SIGMOID, 6 MM TUBULOVILLOUS ADENOMA POLYP IN RECTUM, SMALL HEMORRHOIDS, MELANOSIS COLI, DR. AVTAR JEONG AT [facility]    CORONARY ARTERY BYPASS GRAFT N/A 07/18/2019    Procedure: INTRAOPERATIVE SHOAIB; STERNOTOMY CORONARY ARTERY BYPASS x " 3  USING LEFT INTERNAL MAMMARY ARTERY GRAFT UTILIZING ENDOSCOPICALLY HARVESTED RIGHT GREATER SAPHENOUS VEIN AND PRP.;  Surgeon: Bill Devi MD;  Location: [facility] MAIN OR;  Service: Cardiothoracic    CYSTOSCOPY BLADDER STONE LITHOTRIPSY N/A     DENTAL PROCEDURE Bilateral     3 surgeries inder implants    FEMUR IM NAILING/RODDING Left 12/03/2020    Procedure: LEFT HIP INTRAMEDULLARY NAIL;  Surgeon: Niraj Ravi MD;  Location: [facility] MAIN OR;  Service: Orthopedic Spine;  Laterality: Left;    INCISION AND DRAINAGE PERIRECTAL ABSCESS N/A 10/04/2023    Procedure: Incision and drainage of perianal and buttock abscess;  Surgeon: Herbie Villatoro MD;  Location: [facility] MAIN OR;  Service: General;  Laterality: N/A;    INGUINAL HERNIA REPAIR Bilateral     TOENAIL EXCISION  06/2022    TONSILLECTOMY Bilateral     TOTAL HIP ARTHROPLASTY REVISION Left 01/11/2022    Procedure: TOTAL HIP ARTHROPLASTY REVISION- POSTERIOR;  Surgeon: Jurgen Candelaria II, MD;  Location: [facility] OR;  Service: Orthopedics;  Laterality: Left;    VASECTOMY N/A       General Information       Row Name 06/03/24 1509          OT Time and Intention    Document Type therapy note (daily note)  -MM     Mode of Treatment individual therapy;occupational therapy  -MM       Row Name 06/03/24 1509          General Information    Patient Profile Reviewed yes  -MM     Existing Precautions/Restrictions fall;oxygen therapy device and L/min  4L  -MM       Row Name 06/03/24 1509          Cognition    Orientation Status (Cognition) oriented to;person;place;disoriented to;situation  -MM       Row Name 06/03/24 1509          Safety Issues, Functional Mobility    Safety Issues Affecting Function (Mobility) safety precautions follow-through/compliance;sequencing abilities;safety precaution awareness;judgment  -MM     Impairments Affecting Function (Mobility) balance;cognition;endurance/activity tolerance;strength;coordination;motor control  -MM                User Key  (r) = Recorded By, (t) = Taken By, (c) = Cosigned By      Initials Name Provider Type    Annita Hi OT Occupational Therapist                     Mobility/ADL's       Row Name 06/03/24 1510          Bed Mobility    Bed Mobility supine-sit;sit-supine  -MM     Supine-Sit [place] (Bed Mobility) 1 person assist;moderate assist (50% patient effort);verbal cues  -MM     Sit-Supine [place] (Bed Mobility) minimum assist (75% patient effort);1 person assist;verbal cues  -MM     Assistive Device (Bed Mobility) bed rails;head of bed elevated  -MM     Comment, (Bed Mobility) cues for hand placement to come to EOB, assist with mostly BLE back into bed  -MM       Row Name 06/03/24 1510          Transfers    Transfers sit-stand transfer  -MM       Row Name 06/03/24 1510          Sit-Stand Transfer    Sit-Stand [place] (Transfers) minimum assist (75% patient effort);1 person assist  -MM     Assistive Device (Sit-Stand Transfers) walker, front-wheeled  -MM       Row Name 06/03/24 1510          Stand-Sit Transfer    Stand-Sit [place] (Transfers) contact guard;minimum assist (75% patient effort)  -MM     Assistive Device (Stand-Sit Transfers) walker, front-wheeled  -MM       Row Name 06/03/24 1510          Functional Mobility    Functional Mobility- Ind. Level minimum assist (75% patient effort);1 person + 1 person to manage equipment  -MM     Functional Mobility- Device walker, front-wheeled  -MM     Functional Mobility- Comment to sinkside and returned to EOB, increased time required due to distractions and line mgment  -MM       Row Name 06/03/24 1510          Activities of Daily Living    BADL Assessment/Intervention lower body dressing;toileting  -MM       Row Name 06/03/24 1510          Lower Body Dressing Assessment/Training    Comment, (Lower Body Dressing) L sock total A, increased swelling noted, RN aware  -MM       Row Name 06/03/24 1510          Toileting Assessment/Training     Comment, (Toileting) purewick donned and brief  -MM               User Key  (r) = Recorded By, (t) = Taken By, (c) = Cosigned By      Initials Name Provider Type    Annita Hi OT Occupational Therapist                   Obj/Interventions       Row Name 06/03/24 1512          Elbow/Forearm (Therapeutic Exercise)    Elbow/Forearm (Therapeutic Exercise) AAROM (active assistive range of motion)  [place] at times  -MM     Elbow/Forearm AAROM (Therapeutic Exercise) bilateral;flexion;extension  -MM       Row Name 06/03/24 1512          Motor Skills    Motor Skills functional endurance  -MM     Functional Endurance 4L donned, above 90% throughout session and following activity  -MM     Therapeutic Exercise elbow/forearm  -MM       Row Name 06/03/24 1512          Balance    Balance Assessment sitting static balance;sitting dynamic balance;sit to stand dynamic balance;standing static balance;standing dynamic balance  -MM     Static Sitting Balance standby assist  -MM     Dynamic Sitting Balance contact guard  -MM     Position, Sitting Balance sitting edge of bed  -MM     Sit to Stand Dynamic Balance minimal assist  -MM     Static Standing Balance minimal assist  -MM     Dynamic Standing Balance minimal assist  -MM     Position/Device Used, Standing Balance supported;walker, front-wheeled  -MM     Balance Interventions sitting;standing;sit to stand;supported;static;dynamic;moderate challenge;occupation based/functional task;weight shifting activity  -MM     Comment, Balance no overt LOBs, mildly unsteady with func mob and min A with turns throughout func mobility required for ADL tasks  -MM               User Key  (r) = Recorded By, (t) = Taken By, (c) = Cosigned By      Initials Name Provider Type    Annita Hi OT Occupational Therapist                   Goals/Plan    No documentation.                  Clinical Impression       Row Name 06/03/24 1513          Pain Assessment    Pretreatment Pain Rating 0/10 - no  pain  -MM     Posttreatment Pain Rating 0/10 - no pain  -MM       Row Name 06/03/24 1513          Plan of Care Review    Plan of Care Reviewed With spouse;patient  -MM     Progress improving  -MM     Outcome Evaluation pt seen in PM for OT, noted improvement in alertness and orientation. Confusion noted intermittently throughout session however easily redirected to task. He demo's improvements toward goals, LLE noted increased swelling, max A to don sock in supine. Mod A to come to sitting, SBA for sit balance throughout UE ther ex in elbow planes, [place] at times for technique required. STS this date with Tank, min A x1 with x1 person to assist with line mgment. He is able to demo short household distance to sinkside with rwx support, mild unsteadiness noted however improved use and support of rwx this date. He will continue to benefit from skilled OT to address functional deficits in overall strength, safety and activity tolerance with ADLs. Currently on 4L with spO2 above 90% throughout session. D/c plan as of now, home with spouse 24/7 assist, continue to progress as able.  -MM       Row Name 06/03/24 1513          Therapy Plan Review/Discharge Plan (OT)    Anticipated Discharge Disposition (OT) home with 24/7 care;home with assist  -MM       Row Name 06/03/24 1513          Vital Signs    O2 Delivery Pre Treatment nasal cannula  -MM     Intra SpO2 (%) 95  -MM     O2 Delivery Intra Treatment nasal cannula  -MM     O2 Delivery Post Treatment nasal cannula  -MM       Row Name 06/03/24 1513          Positioning and Restraints    Pre-Treatment Position in bed  -MM     Post Treatment Position bed  -MM     In Bed notified nsg;fowlers;call light within reach;encouraged to call for assist;exit alarm on;with family/caregiver;side rails up x3  -MM               User Key  (r) = Recorded By, (t) = Taken By, (c) = Cosigned By      Initials Name Provider Type    Annita Hi OT Occupational Therapist                    Outcome Measures       Row Name 06/03/24 1516          How much help from another is currently needed...    Putting on and taking off regular lower body clothing? 2  -MM     Bathing (including washing, rinsing, and drying) 2  -MM     Toileting (which includes using toilet bed pan or urinal) 2  -MM     Putting on and taking off regular upper body clothing 3  -MM     Taking care of personal grooming (such as brushing teeth) 3  -MM     Eating meals 3  -MM     AM-PAC 6 Clicks Score (OT) 15  -MM       Row Name 06/03/24 0835          How much help from another person do you currently need...    Turning from your back to your side while in flat bed without using bedrails? 2  -TC     Moving from lying on back to sitting on the side of a flat bed without bedrails? 2  -TC     Moving to and from a bed to a chair (including a wheelchair)? 2  -TC     Standing up from a chair using your arms (e.g., wheelchair, bedside chair)? 2  -TC     Climbing 3-5 steps with a railing? 1  -TC     To walk in hospital room? 1  -TC     AM-PAC 6 Clicks Score (PT) 10  -TC     Highest Level of Mobility Goal 4 --> Transfer to chair/commode  -TC       Row Name 06/03/24 1516          Functional Assessment    Outcome Measure Options AM-PAC 6 Clicks Daily Activity (OT)  -MM               User Key  (r) = Recorded By, (t) = Taken By, (c) = Cosigned By      Initials Name Provider Type    TC Gala Nelson RN Registered Nurse    Annita Hi OT Occupational Therapist                    Occupational Therapy Education       Title: PT OT SLP Therapies (Done)       Topic: Occupational Therapy (Done)       Point: ADL training (Done)       Description:   Instruct learner(s) on proper safety adaptation and remediation techniques during self care or transfers.   Instruct in proper use of assistive devices.                  Learning Progress Summary             Patient Acceptance, E,TB,D, VU by IRENE at 6/1/2024 7002    Acceptance, E, VU by KEV at 5/31/2024 4310    Family Acceptance, E,TB,D, VU by  at 6/1/2024 2218    Acceptance, E, VU by  at 5/31/2024 1056                         Point: Home exercise program (Done)       Description:   Instruct learner(s) on appropriate technique for monitoring, assisting and/or progressing therapeutic exercises/activities.                  Learning Progress Summary             Patient Acceptance, E,TB,D, VU by  at 6/1/2024 2218    Acceptance, E, VU by  at 5/31/2024 1056   Family Acceptance, E,TB,D, VU by CB at 6/1/2024 2218    Acceptance, E, VU by  at 5/31/2024 1056                         Point: Precautions (Done)       Description:   Instruct learner(s) on prescribed precautions during self-care and functional transfers.                  Learning Progress Summary             Patient Acceptance, E,TB,D, VU by  at 6/1/2024 2218    Acceptance, E, VU by  at 5/31/2024 1056   Family Acceptance, E,TB,D, VU by  at 6/1/2024 2218    Acceptance, E, VU by  at 5/31/2024 1056                         Point: Body mechanics (Done)       Description:   Instruct learner(s) on proper positioning and spine alignment during self-care, functional mobility activities and/or exercises.                  Learning Progress Summary             Patient Acceptance, E,TB,D, VU by  at 6/1/2024 2218    Acceptance, E, VU by  at 5/31/2024 1056   Family Acceptance, E,TB,D, VU by  at 6/1/2024 2218    Acceptance, E, VU by  at 5/31/2024 1056                                         User Key       Initials Effective Dates Name Provider Type Discipline     02/21/24 -  Murphy Herndon, RN Registered Nurse Nurse     04/30/24 -  Annita Tabares OT Student OT Student OT                  OT Recommendation and Plan     Plan of Care Review  Plan of Care Reviewed With: spouse, patient  Progress: improving  Outcome Evaluation: pt seen in PM for OT, noted improvement in alertness and orientation. Confusion noted intermittently throughout session however  easily redirected to task. He demo's improvements toward goals, LLE noted increased swelling, max A to don sock in supine. Mod A to come to sitting, SBA for sit balance throughout UE ther ex in elbow planes, [place] at times for technique required. STS this date with Tank, min A x1 with x1 person to assist with line mgment. He is able to demo short household distance to sinkside with rwx support, mild unsteadiness noted however improved use and support of rwx this date. He will continue to benefit from skilled OT to address functional deficits in overall strength, safety and activity tolerance with ADLs. Currently on 4L with spO2 above 90% throughout session. D/c plan as of now, home with spouse 24/7 assist, continue to progress as able.     Time Calculation:         Time Calculation- OT       Row Name 06/03/24 1518             Time Calculation- OT    OT Start Time 1437  -MM      OT Stop Time 1500  -MM      OT Time Calculation (min) 23 min  -MM      Total Timed Code Minutes- OT 23 minute(s)  -MM      OT Received On 06/03/24  -MM      OT - Next Appointment 06/04/24  -MM         Timed Charges    54475 - OT Therapeutic Activity Minutes 23  -MM         Total Minutes    Timed Charges Total Minutes 23  -MM       Total Minutes 23  -MM                User Key  (r) = Recorded By, (t) = Taken By, (c) = Cosigned By      Initials Name Provider Type    Annita Hi OT Occupational Therapist                  Therapy Charges for Today       Code Description Service Date Service Provider Modifiers Qty    89163830722 HC OT THERAPEUTIC ACT EA 15 MIN 6/3/2024 Annita Gracia OT GO 2                 Annita Gracia OT  6/3/2024

## 2024-06-03 NOTE — PROGRESS NOTES
[facility] to Hospital [facility]    [phone]   LOS: 4 days     Name: Dilip Verma  Age/Sex: 74 y.o. male  :  1950        PCP: Fernando Camargo DO  Chief Complaint   Patient presents with    Altered Mental Status      Subjective   He remains a little disoriented times but currently he is alert and oriented x 4.  He is still a little off but much closer to his baseline.  Wife present at the bedside.  General: No Fever or Chills, Cardiac: No Chest Pain or Palpitations, Resp: No Cough or SOA, GI: No Nausea, Vomiting, or Diarrhea, and Other: No bleeding    aspirin, 81 mg, Oral, Daily  brimonidine, 1 drop, Both Eyes, BID  bumetanide, 1 mg, Oral, Daily  cefepime, 2,000 mg, Intravenous, Q8H  DULoxetine, 30 mg, Oral, Daily  enoxaparin, 40 mg, Subcutaneous, Daily  hydrALAZINE, 25 mg, Oral, Q8H  insulin glargine, 20 Units, Subcutaneous, Daily  insulin lispro, 2-9 Units, Subcutaneous, 4x Daily AC & at Bedtime  latanoprost, 1 drop, Both Eyes, Nightly  OLANZapine, 5 mg, Oral, Nightly  senna-docusate sodium, 2 tablet, Oral, BID  sodium chloride, 10 mL, Intravenous, Q12H  terazosin, 1 mg, Oral, Nightly  vancomycin, 1,500 mg, Intravenous, Q24H      Pharmacy to dose vancomycin,         Objective   Vital Signs  Temp:  [98.2 °F (36.8 °C)-98.4 °F (36.9 °C)] 98.4 °F (36.9 °C)  Heart Rate:  [75-91] 91  Resp:  [18] 18  BP: (151-179)/(76-93) 179/76  Body mass index is 31.02 kg/m².    Intake/Output Summary (Last 24 hours) at 6/3/2024 1239  Last data filed at 6/3/2024 0900  Gross per 24 hour   Intake 1080 ml   Output 1425 ml   Net -345 ml       Physical Exam  Constitutional:       General: He is not in acute distress.     Appearance: He is obese. He is ill-appearing.   Cardiovascular:      Rate and Rhythm: Normal rate and regular rhythm.   Pulmonary:      Effort: No respiratory distress.      Breath sounds: Normal breath sounds.   Abdominal:      General: Bowel sounds are normal.      Palpations: Abdomen is soft.   Musculoskeletal:       Left lower leg: Edema present.      Comments: Still with doughy edema in his left lower extremity with significant erythema.  Compressive Ace wrap to mid calf.   Skin:     Findings: Erythema present.   Neurological:      Mental Status: Mental status is at baseline.           Results Review:       I reviewed the patient's new clinical results.  Results from last 7 days   Lab Units 06/03/24  0608 06/02/24  0752 06/01/24  0551 05/31/24  0620 05/30/24  0433   WBC 10*3/mm3 7.71 7.94 8.26 14.38* 30.36*   HEMOGLOBIN g/dL 13.1 13.0 13.2 13.4 16.4   PLATELETS 10*3/mm3 195 204 168 184 230     Results from last 7 days   Lab Units 06/03/24  0608 06/02/24  0752 06/01/24  0551 05/31/24  0620 05/30/24  0955 05/30/24  0433   SODIUM mmol/L 140 141 138 137 140 138   POTASSIUM mmol/L 3.9 4.2 4.5 4.4 4.0 5.5*   CHLORIDE mmol/L 105 107 106 102 107 98   CO2 mmol/L 23.1 24.0 23.4 27.0 24.6 29.2*   BUN mg/dL 17 23 31* 32* 22 24*   CREATININE mg/dL 1.07 0.94 1.22 1.51* 0.90 1.17   CALCIUM mg/dL 8.6 8.5* 8.3* 8.8 7.3* 9.6   MAGNESIUM mg/dL 2.0 2.1  --   --   --  1.9   PHOSPHORUS mg/dL 3.1 2.4*  --   --   --   --    Estimated Creatinine Clearance: 65.8 mL/min (by C-G formula based on SCr of 1.07 mg/dL).      Assessment & Plan   Active Hospital Problems    Diagnosis  POA    **Acute UTI (urinary tract infection) [N39.0]  Yes    AMENA (acute kidney injury) [N17.9]  Unknown    Sepsis [A41.9]  Yes    Hyperkalemia [E87.5]  Unknown    Metabolic encephalopathy [G93.41]  Unknown    Peripheral arterial disease [I73.9]  Yes    History of stroke [Z86.73]  Not Applicable    CKD (chronic kidney disease) stage 3, GFR 30-59 ml/min [N18.30]  Yes    HTN (hypertension) [I10]  Yes    CSA (central sleep apnea) [G47.31]  Yes    Chronic diastolic heart failure [I50.32]  Yes    Hx of CABG [Z95.1]  Not Applicable    CAD (coronary artery disease) [I25.10]  Yes    Type 2 diabetes mellitus with hyperglycemia, with long-term current use of insulin [E11.65, Z79.4]  Not  Applicable      Resolved Hospital Problems   No resolved problems to display.       PLAN  This is a 74-year-old gentleman with a history of chronic kidney disease hypertension coronary artery disease type 2 diabetes peripheral artery disease who presents to the hospital with metabolic encephalopathy and is found to have sepsis and acute renal failure secondary to underlying urinary tract infection  -Urine cultures growing gram-negative rods although it is not a great colony count at  50,000 CFU.  Ultimately cultures have speciated Serratia which is not a real typical urinary organism.  -This left lower extremity is looking more more like cellulitis likely originating from this abrasion on his tibial surface.  It is possible that he might have a Serratia skin infection resulting.  Transient bacteremia and transient bacteriuria.  I have adjusted antibiotics and placed him on Zosyn to cover the Serratia have also covered with vancomycin since this leg infection did not get better with Rocephin cannot rule out staph infection.  I have asked infectious disease to weigh in and assist with ongoing antibiotic decisions and management.  -Continue IV antibiotics.  PSA was within normal limits no evidence of prostatitis.  He does have some urinary retention which is also contributing to his acute renal failure.  -Continue to cath as needed.  Encourage out of bed activity as this will help with urinary retention.  Hytrin started.  -Baseline creatinine around 1.1 up to 1.5 trending down.  Nephrology following  -Noted metabolic encephalopathy appears to be resolving  -Pharmacologic DVT prophylaxis  -Full code      Disposition  Expected Discharge Date: 6/4/2024; Expected Discharge Time:        Que Gregg MD  [facility]  06/03/24  12:39 EDT

## 2024-06-03 NOTE — DISCHARGE PLACEMENT REQUEST
"Dilip Reilly \"Ck\" (74 y.o. Male)       Date of Birth   1950    Social Security Number       Address   [address] [place] [address]    Home Phone   [phone]    MRN   [id number]       [employer]   Non-Jain    Marital Status                               Admission Date   5/30/24    Admission Type   Emergency    Admitting Provider   Jaquan Grossman MD    Attending Provider   Que Gregg MD    Department, Room/Bed   [address], [address]       Discharge Date       Discharge Disposition       Discharge Destination                                 Attending Provider: Que Gregg MD    Allergies: Ace Inhibitors, Angiotensin Receptor Blockers, Dapagliflozin, Losartan, Seroquel [Quetiapine], Xanax [Alprazolam], Amlodipine, Ativan [Lorazepam], Baclofen, Misc. Sulfonamide Containing Compounds, Minoxidil, Tetracycline    Isolation: None   Infection: None   Code Status: CPR    Ht: 172.7 cm (68\")   Wt: 92.5 kg (204 lb)    Admission Cmt: None   Principal Problem: Acute UTI (urinary tract infection) [N39.0]                   Active Insurance as of 5/30/2024       Primary Coverage       Payor Plan Insurance Group Employer/Plan Group    [facility] MEDICARE REPLACEMENT [facility] [employer] [id number]       Payor Plan Address Payor Plan Phone Number Payor Plan Fax Number Effective Dates    [address] [address]   1/1/2023 - None Entered    [facility] [id number]         Subscriber Name Subscriber Birth Date Member ID       DILIP REILLY 1950 [id number]                     Emergency Contacts        (Rel.) Home Phone Work Phone Mobile Phone    BayronBertinn (Spouse) [phone] -- [phone]    SILVIA REILLY (Son) [phone] -- [phone]                "

## 2024-06-03 NOTE — CONSULTS
CONSULT NOTE    Infectious Diseases - Tino Bright MD  [facility]       Patient Identification:  Name: Dilip Verma  Age: 74 y.o.  Sex: male  :  1950  MRN: [id number]             Date of Consultation: 6/3/2024      Primary Care Physician: Fernando Camargo DO                               Requesting Physician: Dr. Seaman  Reason for Consultation: Possible left lower extremity cellulitis    History of presenting illness: Patient is a 74-year-old male with complicated past medical history including history of coronary artery disease, diabetes mellitus, hypertension, chronic kidney disease as well as diastolic congestive heart failure was in his usual state of his health until a week prior to coming to the hospital and he was having frequent urination and discomfort upon urination.  According to the wife he also had scraped his left shin while getting out of the car few days prior as they were coming back from doctor's office on 2024.  In this background patient presented to the emergency room on 2024 with sudden onset of confusion disorientation restlessness starting Wednesday night.  Evaluation in the emergency room revealed abnormal urinalysis consistent with UTI along with markers of sepsis.  Patient was started on broad-spectrum antibiotics consisting of Zosyn and vancomycin and was later switched to ceftriaxone which she was on it until 2024 when his antibiotic regimen was switched to vancomycin and Zosyn because of the concern for redness and swelling and edema of the left leg and foot.  Infectious disease service is consulted.  Patient himself is very confused.  Over the course of first 48 hours after being on vancomycin and Zosyn patient white blood cell count improved from 30,000 at the time admission to normal on 2024.  Blood cultures drawn at the time of admission so far have been negative but the urine culture shows growth of Serratia marcescens.  Patient has  had history of urinary retention and has had required [facility] catheterization and retention studies in the out of the hospital setting and currently uses urinal until about a week ago when he started having pain and discomfort upon urination.  Impression:  This presentation in the above context is consistent with:  1-metabolic encephalopathy due to  2-systemic infection as a result of urinary tract infection as well as evolving sepsis traumatic cellulitis of the left leg.  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    Recommendations/Discussions:  At this juncture I agree with the care plan consisting of empiric vancomycin and broad-spectrum antibiotic therapy directed towards Serratia UTI.  While Zosyn is considered acceptable treatment for pathogens like Serratia but fourth generation cephalosporins are considered preferred agents for Serratia type pathogens especially if they are considered to be because of significant infection with systemic manifestations which in this case is likely altered mental status and confusion.  Since his PSA level was normal this probably reflect complicated cystitis due to neurogenic bladder and prior  intervention such as [facility] catheterizations and would require 5 to 7 days of antibiotic treatment.  Her left leg is definitely a concern given the erythema swelling in the setting of traumatic injury and would require continuation of vancomycin with elevation of the lower extremities.  Would recommend continue vancomycin and cefepime while elevating his leg and monitoring his clinical course and once he is considered clinically improved and ready to be discharged then would recommend switching him to  ciprofloxacin-as Bactrim may not be appropriate antibiotic choice given his renal function and propensity to have acute kidney injury-and Augmentin to complete 10 to 14-day course of antibiotics for soft tissue infection to address likely pathogens encountered in this situation.  Check MRSA screen.    Patient would need local wound care of the right shin superficial laceration that he incurred before coming to the hospital and may not be unreasonable to have out of hospital wound care appointment.  Thank you Dr. Gregg for letting me be the part of your patient care please see above impression and recommendations.        Past Medical History:  Past Medical History:   Diagnosis Date    AMENA (acute kidney injury) 12/03/2020    Alcoholism 1989    not since 1998    CORI positive     Anemia     Anxiety     Ataxia     Atypical chest pain 04/19/2022    SEEN AT [facility] ER    Balance disorder     Carotid stenosis 3/28/2024    Cataract     BILATERAL, S/P EXTRACTION    Cervical radiculopathy 11/11/2019    SEEN AT  [facility] ER    Cervical spinal cord compression     Chronic diastolic (congestive) heart failure     Chronic kidney disease     STAGE 3, FOLLOWED BY DR. VIVAR    Chronic pancreatitis     Closed left subtrochanteric femur fracture 12/01/2020    ADMITTED TO [facility]    Closed nondisplaced intertrochanteric fracture of left femur 12/01/2020    ADMITTED TO [facility]    Colon polyps     FOLLOWED BY DR. AVTAR JEONG    Constipation     Contracture, right hand     Coronary artery disease     CABG 7/2019    COVID-19 07/2022    DDD (degenerative disc disease), cervical     Diabetes mellitus, type II     IDDM    Diabetic retinopathy     Difficulty walking     Dysphagia     Elevated brain natriuretic peptide (BNP) level 08/2014    Elevated LFTs 03/2021    Erectile dysfunction     Fissure, anal 2022    Foot drop     Fuchs' corneal dystrophy of right eye     Glaucoma     BILATERAL    History of alcohol abuse     Hyperlipidemia     Hypersomnia      "Hypertension     Hypertensive urgency 02/25/2020    ADMITTED TO [facility]    Insomnia     Kidney stones     LV dysfunction 06/2016    Lyme disease     Lymphadenopathy syndrome 05/2021    Macular edema     BILATERAL    Myocardial infarction 11/05/2019    NSTEMI, ADMITTED TO [facility]    Myocardial infarction 07/12/2019    NSTEMI, ADMITTED TO [facility]    Neuropathy in diabetes     Non-celiac gluten sensitivity     Orthostasis     Osteoporosis     Oxygen dependent     PAD (peripheral artery disease)     PNA (pneumonia) 06/2016    LEFT LOBE    Polyneuropathy     PTSD (post-traumatic stress disorder)     Pulmonary hypertension     Pulmonary nodule     Senile ectropion of both lower eyelids 02/2022    Sepsis 12/16/2020    D/T UTI, ADMITTED TO [facility]    Sleep apnea     STATES DOESN'T USE BIPAP OR CPAP    Spinal stenosis in cervical region     SEVERE-LIMITED ROM    Stroke 2012    \"slight stroke\"    Syncope and collapse 07/06/2019    ADMITTED TO [facility]    Urinary retention 04/05/2021    SEEN AT [facility] ER    Vision loss     Vitamin D deficiency      Past Surgical History:  Past Surgical History:   Procedure Laterality Date    CARDIAC CATHETERIZATION N/A 07/15/2019    Procedure: Coronary angiography;  Surgeon: Carrie Price MD;  Location:  [facility];  Service: Cardiovascular    CARDIAC CATHETERIZATION N/A 07/15/2019    Procedure: Left Heart Cath;  Surgeon: Carrie Price MD;  Location:  [facility];  Service: Cardiovascular    CARDIAC CATHETERIZATION N/A 07/15/2019    Procedure: Left ventriculography;  Surgeon: Carrie Price MD;  Location:  [facility];  Service: Cardiovascular    CARDIAC CATHETERIZATION  07/15/2019    Procedure: Functional Flow [place];  Surgeon: Carrie Price MD;  Location:  [facility];  Service: Cardiovascular    CARDIAC CATHETERIZATION N/A 11/06/2019    Procedure: Right and Left Heart Cath;  Surgeon: Mya Smith MD;  [facility];  Service: Cardiovascular    CARDIAC CATHETERIZATION N/A 11/06/2019    Procedure: Coronary angiography;  Surgeon: Mya Smith MD;  [facility];  Service: Cardiovascular    CARDIAC SURGERY      CATARACT EXTRACTION Left 2014    CATARACT EXTRACTION Right 11/2016    PHACO/IOL, DR. LEN DENTON    COLONOSCOPY N/A 03/16/2023    ENTIRE COLON WNL, RESCOPE IN 5 YRS, DR. LINDA LIZARRAGA AT [facility]    COLONOSCOPY W/ POLYPECTOMY N/A 01/02/2015    A FEW DIVERTICULA IN SIGMOID, 6 MM TUBULOVILLOUS ADENOMA POLYP IN RECTUM, SMALL HEMORRHOIDS, MELANOSIS COLI, DR. AVTAR JEONG AT [place] [facility]    CORONARY ARTERY BYPASS GRAFT N/A 07/18/2019    Procedure: INTRAOPERATIVE SHOAIB; STERNOTOMY CORONARY ARTERY BYPASS x 3  USING LEFT INTERNAL MAMMARY ARTERY GRAFT UTILIZING ENDOSCOPICALLY HARVESTED RIGHT GREATER SAPHENOUS VEIN AND PRP.;  Surgeon: Bill Devi MD;  Location: [facility] OR;  Service: Cardiothoracic    CYSTOSCOPY BLADDER STONE LITHOTRIPSY N/A     DENTAL PROCEDURE Bilateral     3 surgeries inder implants    FEMUR IM NAILING/RODDING Left 12/03/2020    Procedure: LEFT HIP INTRAMEDULLARY NAIL;  Surgeon: Niraj Ravi MD;  Location: [facility] MAIN OR;  Service: Orthopedic Spine;  Laterality: Left;    INCISION AND DRAINAGE PERIRECTAL ABSCESS N/A 10/04/2023    Procedure: Incision and drainage of perianal and buttock abscess;  Surgeon: Herbie Villatoro MD;  Location: [facility] MAIN OR;  Service: General;  Laterality: N/A;    INGUINAL HERNIA REPAIR Bilateral     TOENAIL EXCISION  06/2022    TONSILLECTOMY Bilateral     TOTAL HIP ARTHROPLASTY REVISION Left 01/11/2022    Procedure: TOTAL HIP ARTHROPLASTY REVISION- POSTERIOR;  Surgeon: Jurgen Candelaria II, MD;  Location: [facility] MAIN OR;  Service: Orthopedics;  Laterality: Left;    VASECTOMY N/A       Home Meds:  Medications Prior to Admission   Medication Sig Dispense Refill Last Dose    aspirin 81 MG EC tablet Take 1 tablet by  mouth Every Night.   5/29/2024    brimonidine (ALPHAGAN) 0.2 % ophthalmic solution Administer 1 drop to both eyes 2 (Two) Times a Day.   Past Week    bumetanide (BUMEX) 1 MG tablet Take 1 tablet by mouth Daily.   5/29/2024    Cholecalciferol (Vitamin D-3) 125 MCG (5000 UT) tablet Take 1 tablet by mouth Daily.   5/29/2024    DULoxetine (CYMBALTA) 30 MG capsule Take 1 capsule by mouth Every Night.   5/29/2024    Insulin Glargine, 2 Unit Dial, (TOUJEO) 300 UNIT/ML solution pen-injector injection Inject 24 Units under the skin into the appropriate area as directed Daily.   5/29/2024    insulin lispro (humaLOG) 100 UNIT/ML injection Inject 0-14 Units under the skin into the appropriate area as directed 3 (Three) Times a Day Before Meals. (Patient taking differently: Inject 0-14 Units under the skin into the appropriate area as directed 3 (Three) Times a Day Before Meals. SLIDING SCALE  100-150 - 4 units  151-200 - 5 units  201-250 - 6 units  251-300 - 7 units  301-350 - 8 units  351-400 - 9 units  401+ - 10 units)  12 5/30/2024    latanoprost (XALATAN) 0.005 % ophthalmic solution Administer 1 drop to both eyes every night at bedtime.   Past Week    multivitamin with minerals (MULTIVITAMIN ADULTS PO) Take 1 tablet by mouth Daily.   5/29/2024    testosterone (ANDROGEL) 25 MG/2.5GM (1%) gel gel Place 25 mg on the skin as directed by provider 2 (Two) Times a Week.   Past Week    traMADol (ULTRAM) 50 MG tablet Take 1 tablet by mouth At Night As Needed.   5/29/2024    Magnesium 400 MG capsule Take 400 mg by mouth As Needed.   More than a month    sildenafil (REVATIO) 20 MG tablet Take 1 tablet by mouth As Needed.   More than a month     Current Meds:     Current Facility-Administered Medications:     acetaminophen (TYLENOL) tablet 650 mg, 650 mg, Oral, Q4H PRN, 650 mg at 06/01/24 0330 **OR** acetaminophen (TYLENOL) 160 MG/5ML oral solution 650 mg, 650 mg, Oral, Q4H PRN **OR** acetaminophen (TYLENOL) suppository 650 mg, 650 mg,  Rectal, Q4H PRN, Jaquan Grossman MD    aspirin EC tablet 81 mg, 81 mg, Oral, Daily, Jaquan Grossman MD, 81 mg at 06/02/24 2214    sennosides-docusate (PERICOLACE) 8.6-50 MG per tablet 2 tablet, 2 tablet, Oral, BID, 2 tablet at 06/03/24 0948 **AND** polyethylene glycol (MIRALAX) packet 17 g, 17 g, Oral, Daily PRN **AND** bisacodyl (DULCOLAX) EC tablet 5 mg, 5 mg, Oral, Daily PRN **AND** bisacodyl (DULCOLAX) suppository 10 mg, 10 mg, Rectal, Daily PRN, Jaquan Grossman MD    brimonidine (ALPHAGAN) 0.2 % ophthalmic solution 1 drop, 1 drop, Both Eyes, BID, Jaquan Grossman MD, 1 drop at 06/03/24 0955    bumetanide (BUMEX) tablet 1 mg, 1 mg, Oral, Daily, Nya Saez MD, 1 mg at 06/02/24 2214    dextrose (D50W) (25 g/50 mL) IV injection 25 g, 25 g, Intravenous, Q15 Min PRN, Jaquan Grossman MD    dextrose (GLUTOSE) oral gel 15 g, 15 g, Oral, Q15 Min PRN, Jaquan Grossman MD    DULoxetine (CYMBALTA) DR capsule 30 mg, 30 mg, Oral, Daily, Jaquan Grossman MD, 30 mg at 06/02/24 2215    Enoxaparin Sodium (LOVENOX) syringe 40 mg, 40 mg, Subcutaneous, Daily, Jaquan Grossman MD, 40 mg at 06/03/24 0948    glucagon (GLUCAGEN) injection 1 mg, 1 mg, Intramuscular, Q15 Min PRN, Jaquan Grossman MD    hydrALAZINE (APRESOLINE) tablet 25 mg, 25 mg, Oral, Q8H, Que Gregg MD, 25 mg at 06/01/24 1515    insulin glargine (LANTUS, SEMGLEE) injection 20 Units, 20 Units, Subcutaneous, Daily, Que Gregg MD, 20 Units at 06/03/24 0948    insulin lispro (HUMALOG/ADMELOG) injection 2-9 Units, 2-9 Units, Subcutaneous, 4x Daily AC & at Bedtime, Jaquan Grossman MD, 2 Units at 06/03/24 1137    latanoprost (XALATAN) 0.005 % ophthalmic solution 1 drop, 1 drop, Both Eyes, Nightly, Jaquan Grossman MD, 1 drop at 06/02/24 5415    nitroglycerin (NITROSTAT) SL tablet 0.4 mg, 0.4 mg, Sublingual, Q5 Min PRN, Jaquan Grossman  MD [place]    OLANZapine (zyPREXA) injection 10 mg, 10 mg, Intramuscular, Q8H PRN, Jaquan Grossamn MD, 10 mg at 05/31/24 0047    OLANZapine (zyPREXA) tablet 5 mg, 5 mg, Oral, Nightly, Jaquan Grossman MD, 5 mg at 06/02/24 2215    ondansetron (ZOFRAN) injection 4 mg, 4 mg, Intravenous, Q6H PRN, Jaquan Grossman MD, 4 mg at 05/31/24 0047    Pharmacy to dose vancomycin, , Does not apply, Continuous PRN, Que Gregg MD    piperacillin-tazobactam (ZOSYN) 3.375 g IVPB in 100 mL NS MBP (CD), 3.375 g, Intravenous, Q8H, Que Gregg MD, 3.375 g at 06/03/24 0948    sodium chloride 0.9 % flush 10 mL, 10 mL, Intravenous, Q12H, Jaquan Grossman MD, 10 mL at 06/03/24 0900    sodium chloride 0.9 % flush 10 mL, 10 mL, Intravenous, PRN, Jaquan Grossman MD    sodium chloride 0.9 % infusion 40 mL, 40 mL, Intravenous, PRN, Jaquan Grossman MD    terazosin (HYTRIN) capsule 1 mg, 1 mg, Oral, Nightly, Que Gregg MD, 1 mg at 06/02/24 2215    traMADol (ULTRAM) tablet 50 mg, 50 mg, Oral, Q6H PRN, Que Gregg MD, 50 mg at 06/02/24 2214    vancomycin IVPB 1500 mg in 0.9% NaCl (Premix) 500 mL, 1,500 mg, Intravenous, Q24H, Que Gregg MD, Last Rate: 333.3 mL/hr at 06/03/24 1137, 1,500 mg at 06/03/24 1137  Allergies:  Allergies   Allergen Reactions    Ace Inhibitors Angioedema    Angiotensin Receptor Blockers Angioedema and Unknown (See Comments)     Angioneurotic edema    Dapagliflozin Other (See Comments)     UTI,  rectal abcess    Losartan Angioedema     Angioneurotic edema    Seroquel [Quetiapine] Hallucinations    Xanax [Alprazolam] Unknown - High Severity    Amlodipine Swelling    Ativan [Lorazepam] Hallucinations    Baclofen Hallucinations    Misc. Sulfonamide Containing Compounds Unknown (See Comments)    Minoxidil Confusion    Tetracycline Rash     bliisters in mouth       Social History:   Social History     Tobacco Use    Smoking  "status: Former     Current packs/day: 0.00     Average packs/day: 0.8 packs/day for 16.0 years (12.0 ttl pk-yrs)     Types: Cigarettes     Start date: 1984     Quit date: 2000     Years since quittin.4     Passive exposure: Past    Smokeless tobacco: Never    Tobacco comments:     Start age:39/Stopping age:47, 3/4 PPD   Substance Use Topics    Alcohol use: Not Currently     Comment: none since       Family History:  Family History   Problem Relation Age of Onset    Heart disease Mother     Hypertension Mother     Hyperlipidemia Mother     Depression Mother     Cancer Mother         uterine    Arrhythmia Mother     Uterine cancer Mother     Mental illness Mother     Migraines Mother     Hyperlipidemia Father     Heart disease Father     Hypertension Father     Kidney disease Father     Thyroid disease Father     Heart failure Father     Depression Sister     Alcohol abuse Brother     Thyroid disease Paternal Uncle     Lung disease Paternal Uncle     Stroke Maternal Grandmother     Glaucoma Maternal Grandmother     Heart attack Paternal Grandmother     Stroke Paternal Grandmother     Alcohol abuse Paternal Grandfather     Malig Hyperthermia Neg Hx           Review of Systems  See history of present illness and past medical history.   Confused and thinks that Elkin Colemanshiraz directed him to come to the hospital.  He does know that [facility] at [facility].      Vitals:   /76 (BP Location: Right arm, Patient Position: Lying) Comment: RN notified  Pulse 91   Temp 98.4 °F (36.9 °C) (Oral)   Resp 18   Ht 172.7 cm (68\")   Wt 92.5 kg (204 lb)   SpO2 92%   BMI 31.02 kg/m²   I/O:   Intake/Output Summary (Last 24 hours) at 6/3/2024 1151  Last data filed at 6/3/2024 0900  Gross per 24 hour   Intake 1080 ml   Output 1425 ml   Net -345 ml     Exam:  Patient is examined using the personal protective equipment as per guidelines from infection control for this particular patient as enacted.  Hand washing was " performed before and after patient interaction.  General Appearance:  Alert chronically ill confused male who is oriented to place and name.   Head:    Normocephalic, without obvious abnormality, atraumatic   Eyes:    PERRL, conjunctivae/corneas clear, EOM's intact, both eyes   Ears:    Normal external ear canals, both ears   Nose:   Nares normal, septum midline, mucosa normal, no drainage    or sinus tenderness   Throat:   Lips, tongue, gums normal; oral mucosa pink and moist   Neck: Supple and no adenopathy   Back:     Symmetric, no curvature, ROM normal, no CVA tenderness   Lungs:     Clear to auscultation bilaterally, respirations unlabored   Chest Wall:    No tenderness or deformity    Heart:  S1-S2 irregular   Abdomen:   Soft nontender   Extremities:          Skin: Cellulitic change and erythema and bruising of the left lower extremity and foot noted   Neurologic: Confused     Data Review:    I reviewed the patient's new clinical results.  Results from last 7 days   Lab Units 06/03/24  0608 06/02/24  0752 06/01/24  0551 05/31/24  0620 05/30/24  0433   WBC 10*3/mm3 7.71 7.94 8.26 14.38* 30.36*   HEMOGLOBIN g/dL 13.1 13.0 13.2 13.4 16.4   PLATELETS 10*3/mm3 195 204 168 184 230     Results from last 7 days   Lab Units 06/03/24  0608 06/02/24  0752 06/01/24  0551 05/31/24  0620 05/30/24  0955 05/30/24  0433   SODIUM mmol/L 140 141 138 137 140 138   POTASSIUM mmol/L 3.9 4.2 4.5 4.4 4.0 5.5*   CHLORIDE mmol/L 105 107 106 102 107 98   CO2 mmol/L 23.1 24.0 23.4 27.0 24.6 29.2*   BUN mg/dL 17 23 31* 32* 22 24*   CREATININE mg/dL 1.07 0.94 1.22 1.51* 0.90 1.17   CALCIUM mg/dL 8.6 8.5* 8.3* 8.8 7.3* 9.6   GLUCOSE mg/dL 105* 74 166* 97 228* 306*     Microbiology Results (last 10 days)       Procedure Component Value - Date/Time    Eosinophil Smear - Urine, Urine, Clean Catch [[id number]]  (Normal) Collected: 05/31/24 8544    Lab Status: Final result Specimen: Urine, Clean Catch Updated: 05/31/24 8354     Eosinophil Smear 0  % EOS/100 Cells     Urine Culture - Urine, Urine, Clean Catch [[id number]]  (Abnormal)  (Susceptibility) Collected: 05/30/24 0538    Lab Status: Final result Specimen: Urine, Clean Catch Updated: 06/02/24 0935     Urine Culture 50,000 CFU/mL Serratia marcescens    Narrative:      Colonization of the urinary tract without infection is common. Treatment is discouraged unless the patient is symptomatic, pregnant, or undergoing an invasive urologic procedure.    Susceptibility        Serratia marcescens      KAT Not Specified      Amoxicillin + Clavulanate Resistant       Cefazolin Resistant       Cefepime Susceptible       Ceftazidime Susceptible       Ceftriaxone Susceptible       Gentamicin Susceptible       Levofloxacin Susceptible       Nitrofurantoin Resistant       Piperacillin + Tazobactam  Susceptible      Trimethoprim + Sulfamethoxazole Susceptible                          Susceptibility Comments       Serratia marcescens    Pip/patsy confirmed by disk diffusion.               Blood Culture - Blood, Arm, Left [[id number]]  (Normal) Collected: 05/30/24 0441    Lab Status: Preliminary result Specimen: Blood from Arm, Left Updated: 06/03/24 0500     Blood Culture No growth at 4 days    Narrative:      Less than seven (7) mL's of blood was collected.  Insufficient quantity may yield false negative results.    Blood Culture - Blood, Arm, Left [[id number]]  (Normal) Collected: 05/30/24 0433    Lab Status: Preliminary result Specimen: Blood from Arm, Left Updated: 06/03/24 0445     Blood Culture No growth at 4 days              Assessment:  Active Hospital Problems    Diagnosis  POA    **Acute UTI (urinary tract infection) [N39.0]  Yes    AMENA (acute kidney injury) [N17.9]  Unknown    Sepsis [A41.9]  Yes    Hyperkalemia [E87.5]  Unknown    Metabolic encephalopathy [G93.41]  Unknown    Peripheral arterial disease [I73.9]  Yes    History of stroke [Z86.73]  Not Applicable    CKD (chronic kidney disease) stage 3, GFR 30-59  ml/min [N18.30]  Yes    HTN (hypertension) [I10]  Yes    CSA (central sleep apnea) [G47.31]  Yes    Chronic diastolic heart failure [I50.32]  Yes    Hx of CABG [Z95.1]  Not Applicable    CAD (coronary artery disease) [I25.10]  Yes    Type 2 diabetes mellitus with hyperglycemia, with long-term current use of insulin [E11.65, Z79.4]  Not Applicable      Resolved Hospital Problems   No resolved problems to display.         Plan:  See above  Tino Nagel MD   6/3/2024  11:51 EDT    Parts of this note may be an electronic transcription/translation of spoken language to printed text using the Dragon dictation system.

## 2024-06-04 LAB
ANION GAP SERPL CALCULATED.3IONS-SCNC: 9 MMOL/L (ref 5–15)
BACTERIA SPEC AEROBE CULT: NORMAL
BACTERIA SPEC AEROBE CULT: NORMAL
BUN SERPL-MCNC: 17 MG/DL (ref 8–23)
BUN/CREAT SERPL: 13.2 (ref 7–25)
CALCIUM SPEC-SCNC: 8.3 MG/DL (ref 8.6–10.5)
CHLORIDE SERPL-SCNC: 106 MMOL/L (ref 98–107)
CO2 SERPL-SCNC: 25 MMOL/L (ref 22–29)
CREAT SERPL-MCNC: 1.29 MG/DL (ref 0.76–1.27)
EGFRCR SERPLBLD CKD-EPI 2021: 57.8 ML/MIN/1.73
GLUCOSE BLDC GLUCOMTR-MCNC: 111 MG/DL (ref 70–130)
GLUCOSE BLDC GLUCOMTR-MCNC: 125 MG/DL (ref 70–130)
GLUCOSE BLDC GLUCOMTR-MCNC: 163 MG/DL (ref 70–130)
GLUCOSE BLDC GLUCOMTR-MCNC: 210 MG/DL (ref 70–130)
GLUCOSE SERPL-MCNC: 129 MG/DL (ref 65–99)
MAGNESIUM SERPL-MCNC: 2 MG/DL (ref 1.6–2.4)
MRSA DNA SPEC QL NAA+PROBE: NORMAL
POTASSIUM SERPL-SCNC: 4.1 MMOL/L (ref 3.5–5.2)
QT INTERVAL: 308 MS
QTC INTERVAL: 495 MS
SODIUM SERPL-SCNC: 140 MMOL/L (ref 136–145)
TSH SERPL DL<=0.05 MIU/L-ACNC: 2.02 UIU/ML (ref 0.27–4.2)
VANCOMYCIN TROUGH SERPL-MCNC: 11 MCG/ML (ref 5–20)

## 2024-06-04 PROCEDURE — 25010000002 AMIODARONE IN DEXTROSE 5% 360-4.14 MG/200ML-% SOLUTION: Performed by: NURSE PRACTITIONER

## 2024-06-04 PROCEDURE — 99222 1ST HOSP IP/OBS MODERATE 55: CPT | Performed by: NURSE PRACTITIONER

## 2024-06-04 PROCEDURE — 84443 ASSAY THYROID STIM HORMONE: CPT | Performed by: NURSE PRACTITIONER

## 2024-06-04 PROCEDURE — 93010 ELECTROCARDIOGRAM REPORT: CPT | Performed by: INTERNAL MEDICINE

## 2024-06-04 PROCEDURE — 25010000002 ENOXAPARIN PER 10 MG: Performed by: INTERNAL MEDICINE

## 2024-06-04 PROCEDURE — 25010000002 VANCOMYCIN 10 G RECONSTITUTED SOLUTION: Performed by: HOSPITALIST

## 2024-06-04 PROCEDURE — 82948 REAGENT STRIP/BLOOD GLUCOSE: CPT

## 2024-06-04 PROCEDURE — 87641 MR-STAPH DNA AMP PROBE: CPT | Performed by: INTERNAL MEDICINE

## 2024-06-04 PROCEDURE — 25010000002 AMIODARONE IN DEXTROSE 5% 150-4.21 MG/100ML-% SOLUTION: Performed by: NURSE PRACTITIONER

## 2024-06-04 PROCEDURE — 63710000001 INSULIN GLARGINE PER 5 UNITS: Performed by: HOSPITALIST

## 2024-06-04 PROCEDURE — 80048 BASIC METABOLIC PNL TOTAL CA: CPT

## 2024-06-04 PROCEDURE — 25010000002 DIGOXIN PER 500 MCG: Performed by: NURSE PRACTITIONER

## 2024-06-04 PROCEDURE — 83735 ASSAY OF MAGNESIUM: CPT | Performed by: HOSPITALIST

## 2024-06-04 PROCEDURE — 80202 ASSAY OF VANCOMYCIN: CPT | Performed by: HOSPITALIST

## 2024-06-04 PROCEDURE — 25810000003 SODIUM CHLORIDE 0.9 % SOLUTION: Performed by: HOSPITALIST

## 2024-06-04 PROCEDURE — 97110 THERAPEUTIC EXERCISES: CPT

## 2024-06-04 PROCEDURE — 63710000001 INSULIN LISPRO (HUMAN) PER 5 UNITS: Performed by: INTERNAL MEDICINE

## 2024-06-04 PROCEDURE — 25010000002 ENOXAPARIN PER 10 MG: Performed by: NURSE PRACTITIONER

## 2024-06-04 PROCEDURE — 25010000002 CEFEPIME PER 500 MG: Performed by: INTERNAL MEDICINE

## 2024-06-04 PROCEDURE — 93005 ELECTROCARDIOGRAM TRACING: CPT | Performed by: HOSPITALIST

## 2024-06-04 RX ORDER — ENOXAPARIN SODIUM 100 MG/ML
1 INJECTION SUBCUTANEOUS EVERY 12 HOURS
Status: DISCONTINUED | OUTPATIENT
Start: 2024-06-04 | End: 2024-06-04

## 2024-06-04 RX ORDER — ENOXAPARIN SODIUM 100 MG/ML
1 INJECTION SUBCUTANEOUS EVERY 12 HOURS
Status: DISCONTINUED | OUTPATIENT
Start: 2024-06-04 | End: 2024-06-05

## 2024-06-04 RX ORDER — ENOXAPARIN SODIUM 100 MG/ML
50 INJECTION SUBCUTANEOUS ONCE
Status: DISCONTINUED | OUTPATIENT
Start: 2024-06-04 | End: 2024-06-05

## 2024-06-04 RX ORDER — DIGOXIN 0.25 MG/ML
250 INJECTION INTRAMUSCULAR; INTRAVENOUS EVERY 6 HOURS
Status: DISCONTINUED | OUTPATIENT
Start: 2024-06-04 | End: 2024-06-04

## 2024-06-04 RX ADMIN — TRAMADOL HYDROCHLORIDE 50 MG: 50 TABLET ORAL at 00:23

## 2024-06-04 RX ADMIN — BUMETANIDE 1 MG: 1 TABLET ORAL at 09:45

## 2024-06-04 RX ADMIN — HYDRALAZINE HYDROCHLORIDE 25 MG: 25 TABLET ORAL at 14:49

## 2024-06-04 RX ADMIN — BRIMONIDINE TARTRATE 1 DROP: 2 SOLUTION OPHTHALMIC at 21:13

## 2024-06-04 RX ADMIN — TERAZOSIN HYDROCHLORIDE 1 MG: 1 CAPSULE ORAL at 21:11

## 2024-06-04 RX ADMIN — INSULIN LISPRO 4 UNITS: 100 INJECTION, SOLUTION INTRAVENOUS; SUBCUTANEOUS at 17:54

## 2024-06-04 RX ADMIN — DIGOXIN 250 MCG: 0.25 INJECTION INTRAMUSCULAR; INTRAVENOUS at 07:58

## 2024-06-04 RX ADMIN — ENOXAPARIN SODIUM 90 MG: 100 INJECTION SUBCUTANEOUS at 21:11

## 2024-06-04 RX ADMIN — LATANOPROST 1 DROP: 50 SOLUTION OPHTHALMIC at 21:13

## 2024-06-04 RX ADMIN — CEFEPIME 2000 MG: 2 INJECTION, POWDER, FOR SOLUTION INTRAVENOUS at 04:26

## 2024-06-04 RX ADMIN — AMIODARONE HYDROCHLORIDE 150 MG: 1.5 INJECTION, SOLUTION INTRAVENOUS at 12:16

## 2024-06-04 RX ADMIN — METOPROLOL TARTRATE 12.5 MG: 25 TABLET, FILM COATED ORAL at 12:22

## 2024-06-04 RX ADMIN — INSULIN GLARGINE 20 UNITS: 100 INJECTION, SOLUTION SUBCUTANEOUS at 09:46

## 2024-06-04 RX ADMIN — HYDRALAZINE HYDROCHLORIDE 25 MG: 25 TABLET ORAL at 21:12

## 2024-06-04 RX ADMIN — TRAMADOL HYDROCHLORIDE 50 MG: 50 TABLET ORAL at 21:12

## 2024-06-04 RX ADMIN — ENOXAPARIN SODIUM 40 MG: 100 INJECTION SUBCUTANEOUS at 09:45

## 2024-06-04 RX ADMIN — ASPIRIN 81 MG: 81 TABLET, COATED ORAL at 21:12

## 2024-06-04 RX ADMIN — BRIMONIDINE TARTRATE 1 DROP: 2 SOLUTION OPHTHALMIC at 09:45

## 2024-06-04 RX ADMIN — CEFEPIME 2000 MG: 2 INJECTION, POWDER, FOR SOLUTION INTRAVENOUS at 22:06

## 2024-06-04 RX ADMIN — METOPROLOL TARTRATE 5 MG: 1 INJECTION, SOLUTION INTRAVENOUS at 04:26

## 2024-06-04 RX ADMIN — AMIODARONE HYDROCHLORIDE 0.5 MG/MIN: 1.8 INJECTION, SOLUTION INTRAVENOUS at 17:54

## 2024-06-04 RX ADMIN — METOPROLOL TARTRATE 12.5 MG: 25 TABLET, FILM COATED ORAL at 21:11

## 2024-06-04 RX ADMIN — DULOXETINE HYDROCHLORIDE 30 MG: 30 CAPSULE, DELAYED RELEASE ORAL at 21:11

## 2024-06-04 RX ADMIN — Medication 10 ML: at 21:13

## 2024-06-04 RX ADMIN — AMIODARONE HYDROCHLORIDE 1 MG/MIN: 1.8 INJECTION, SOLUTION INTRAVENOUS at 12:30

## 2024-06-04 RX ADMIN — CEFEPIME 2000 MG: 2 INJECTION, POWDER, FOR SOLUTION INTRAVENOUS at 13:17

## 2024-06-04 RX ADMIN — Medication 10 ML: at 09:46

## 2024-06-04 RX ADMIN — VANCOMYCIN HYDROCHLORIDE 1500 MG: 10 INJECTION, POWDER, LYOPHILIZED, FOR SOLUTION INTRAVENOUS at 13:18

## 2024-06-04 RX ADMIN — OLANZAPINE 5 MG: 5 TABLET, FILM COATED ORAL at 21:11

## 2024-06-04 NOTE — PLAN OF CARE
Problem: Adult Inpatient Plan of Care  Goal: Plan of Care Review  Outcome: Ongoing, Progressing  Flowsheets (Taken 6/4/2024 1437)  Progress: no change  Plan of Care Reviewed With:   patient   spouse  Outcome Evaluation: Patient is alert and oriented x3- very confused conversation, no complaints of pain, IV abx given. Patient was in Afib RVR- see EMAR for medications given. Patient started on amiodarone drip and converted back to sinus rhythm at 1248. Bed alarm on, bed in lowest position. Wound care RN performed dressing change. Patient does not state that he is seeing things in room , but on exam appears to be experiencing visual hallucinations at times.  Goal: Patient-Specific Goal (Individualized)  Outcome: Ongoing, Progressing  Goal: Absence of Hospital-Acquired Illness or Injury  Outcome: Ongoing, Progressing  Intervention: Identify and Manage Fall Risk  Recent Flowsheet Documentation  Taken 6/4/2024 1400 by Melani Salinas RN  Safety Promotion/Fall Prevention:   activity supervised   assistive device/personal items within reach   clutter free environment maintained   fall prevention program maintained   lighting adjusted   nonskid shoes/slippers when out of bed   room organization consistent   safety round/check completed   toileting scheduled  Taken 6/4/2024 1215 by Melani Salinas RN  Safety Promotion/Fall Prevention:   activity supervised   assistive device/personal items within reach   clutter free environment maintained   fall prevention program maintained   lighting adjusted   nonskid shoes/slippers when out of bed   room organization consistent   safety round/check completed   toileting scheduled  Taken 6/4/2024 1000 by Melani Salinas RN  Safety Promotion/Fall Prevention:   activity supervised   assistive device/personal items within reach   clutter free environment maintained   fall prevention program maintained   lighting adjusted   nonskid shoes/slippers when out of bed   room organization  consistent   safety round/check completed   toileting scheduled  Taken 6/4/2024 0800 by Melani Salinas RN  Safety Promotion/Fall Prevention:   activity supervised   assistive device/personal items within reach   clutter free environment maintained   fall prevention program maintained   lighting adjusted   nonskid shoes/slippers when out of bed   room organization consistent   safety round/check completed   toileting scheduled  Intervention: Prevent Skin Injury  Recent Flowsheet Documentation  Taken 6/4/2024 1400 by Melani Salinas RN  Body Position:   right   turned   legs elevated  Taken 6/4/2024 1215 by Melani Salinas RN  Body Position:   turned   legs elevated   left  Taken 6/4/2024 1000 by Melani Salinas RN  Body Position:   right   turned   legs elevated  Taken 6/4/2024 0800 by Melani Salinas RN  Body Position:   left   turned   legs elevated  Skin Protection:   adhesive use limited   incontinence pads utilized   tubing/devices free from skin contact  Intervention: Prevent and Manage VTE (Venous Thromboembolism) Risk  Recent Flowsheet Documentation  Taken 6/4/2024 1400 by Melani Salinas RN  Activity Management: activity encouraged  Taken 6/4/2024 1215 by Melani Salinas RN  Activity Management: activity encouraged  Taken 6/4/2024 0800 by Melani Salinas RN  Activity Management: activity encouraged  VTE Prevention/Management: (lovenox and LLE swelling)   bilateral   sequential compression devices off   other (see comments)  Range of Motion: active ROM (range of motion) encouraged  Intervention: Prevent Infection  Recent Flowsheet Documentation  Taken 6/4/2024 1400 by Melani Salinas RN  Infection Prevention:   environmental surveillance performed   equipment surfaces disinfected   hand hygiene promoted   personal protective equipment utilized   rest/sleep promoted   single patient room provided  Taken 6/4/2024 1215 by Melani Salinas RN  Infection Prevention:   environmental surveillance performed   equipment  surfaces disinfected   hand hygiene promoted   personal protective equipment utilized   rest/sleep promoted   single patient room provided  Taken 6/4/2024 1000 by Melani Salinas RN  Infection Prevention:   environmental surveillance performed   equipment surfaces disinfected   personal protective equipment utilized   hand hygiene promoted   rest/sleep promoted   single patient room provided  Taken 6/4/2024 0800 by Melani Salinas RN  Infection Prevention:   environmental surveillance performed   equipment surfaces disinfected   hand hygiene promoted   personal protective equipment utilized   rest/sleep promoted   single patient room provided  Goal: Optimal Comfort and Wellbeing  Outcome: Ongoing, Progressing  Intervention: Provide Person-Centered Care  Recent Flowsheet Documentation  Taken 6/4/2024 0800 by Melani Salinas RN  Trust Relationship/Rapport:   care explained   choices provided   emotional support provided   empathic listening provided   questions answered   questions encouraged   reassurance provided   thoughts/feelings acknowledged  Goal: Readiness for Transition of Care  Outcome: Ongoing, Progressing     Problem: Diabetes Comorbidity  Goal: Blood Glucose Level Within Targeted Range  Outcome: Ongoing, Progressing     Problem: Heart Failure Comorbidity  Goal: Maintenance of Heart Failure Symptom Control  Outcome: Ongoing, Progressing  Intervention: Maintain Heart Failure-Management  Recent Flowsheet Documentation  Taken 6/4/2024 1400 by Melani Salinas RN  Medication Review/Management: medications reviewed  Taken 6/4/2024 1215 by Melani Salinas RN  Medication Review/Management: medications reviewed  Taken 6/4/2024 1000 by Melani Salinas RN  Medication Review/Management: medications reviewed  Taken 6/4/2024 0800 by Melani Salinas RN  Medication Review/Management: medications reviewed     Problem: Hypertension Comorbidity  Goal: Blood Pressure in Desired Range  Outcome: Ongoing, Progressing  Intervention:  Maintain Blood Pressure Management  Recent Flowsheet Documentation  Taken 6/4/2024 1400 by Melani Salinas RN  Medication Review/Management: medications reviewed  Taken 6/4/2024 1215 by Melani Salinas RN  Medication Review/Management: medications reviewed  Taken 6/4/2024 1000 by Melani Salinas RN  Medication Review/Management: medications reviewed  Taken 6/4/2024 0800 by Melani Salinas RN  Medication Review/Management: medications reviewed     Problem: Obstructive Sleep Apnea Risk or Actual Comorbidity Management  Goal: Unobstructed Breathing During Sleep  Outcome: Ongoing, Progressing     Problem: Skin Injury Risk Increased  Goal: Skin Health and Integrity  Outcome: Ongoing, Progressing  Intervention: Optimize Skin Protection  Recent Flowsheet Documentation  Taken 6/4/2024 1400 by Melani Salinas RN  Head of Bed (HOB) Positioning: HOB at 30-45 degrees  Taken 6/4/2024 1215 by Melani Salinas RN  Head of Bed (HOB) Positioning: HOB at 30-45 degrees  Taken 6/4/2024 1000 by Melani Salinas RN  Head of Bed (HOB) Positioning: HOB at 30 degrees  Taken 6/4/2024 0800 by Melani Salinas RN  Pressure Reduction Techniques:   frequent weight shift encouraged   weight shift assistance provided   heels elevated off bed   positioned off wounds   pressure points protected  Head of Bed (HOB) Positioning: HOB at 30-45 degrees  Pressure Reduction Devices:   alternating pressure pump (ADD)   foam padding utilized  Skin Protection:   adhesive use limited   incontinence pads utilized   tubing/devices free from skin contact     Problem: Fall Injury Risk  Goal: Absence of Fall and Fall-Related Injury  Outcome: Ongoing, Progressing  Intervention: Identify and Manage Contributors  Recent Flowsheet Documentation  Taken 6/4/2024 1400 by Melani Salinas RN  Medication Review/Management: medications reviewed  Taken 6/4/2024 1215 by Melani Salinas RN  Medication Review/Management: medications reviewed  Taken 6/4/2024 1000 by Melani Salinas  RN  Medication Review/Management: medications reviewed  Taken 6/4/2024 0800 by Melnai Salinas RN  Medication Review/Management: medications reviewed  Intervention: Promote Injury-Free Environment  Recent Flowsheet Documentation  Taken 6/4/2024 1400 by Melani Salinas RN  Safety Promotion/Fall Prevention:   activity supervised   assistive device/personal items within reach   clutter free environment maintained   fall prevention program maintained   lighting adjusted   nonskid shoes/slippers when out of bed   room organization consistent   safety round/check completed   toileting scheduled  Taken 6/4/2024 1215 by Melani Salinas RN  Safety Promotion/Fall Prevention:   activity supervised   assistive device/personal items within reach   clutter free environment maintained   fall prevention program maintained   lighting adjusted   nonskid shoes/slippers when out of bed   room organization consistent   safety round/check completed   toileting scheduled  Taken 6/4/2024 1000 by Melani Salinas RN  Safety Promotion/Fall Prevention:   activity supervised   assistive device/personal items within reach   clutter free environment maintained   fall prevention program maintained   lighting adjusted   nonskid shoes/slippers when out of bed   room organization consistent   safety round/check completed   toileting scheduled  Taken 6/4/2024 0800 by Melani Salinas RN  Safety Promotion/Fall Prevention:   activity supervised   assistive device/personal items within reach   clutter free environment maintained   fall prevention program maintained   lighting adjusted   nonskid shoes/slippers when out of bed   room organization consistent   safety round/check completed   toileting scheduled   Goal Outcome Evaluation:  Plan of Care Reviewed With: patient, spouse        Progress: no change  Outcome Evaluation: Patient is alert and oriented x3- very confused conversation, no complaints of pain, IV abx given. Patient was in Afib RVR- see EMAR for  medications given. Patient started on amiodarone drip and converted back to sinus rhythm at 1248. Bed alarm on, bed in lowest position. Wound care RN performed dressing change. Patient does not state that he is seeing things in room , but on exam appears to be experiencing visual hallucinations at times.

## 2024-06-04 NOTE — CONSULTS
Date of Hospital Visit: 24  Encounter Provider: LEIDY Hall  Place of Service: [facility] CARDIOLOGY  Patient Name: Dilip Verma  :1950  [id number]  Referral Provider: Candido Arroyo MD    Chief complaint:Afib, UTI/Sepsis    History of Present Illness: Dilip Verma is a 74-year-old male who is a patient known to us in the past. He see's Dr. Reyes in the office. He was last seen in January.  In  he was admitted to [place] with syncope.  He had some orthostasis echocardiogram showed his EF was normal with some moderate LVH and mild to moderate left atrial enlargement.  He wore a monitor that showed some paroxysmal atrial tachycardia.  His troponin was elevated during that admission so he underwent a cardiac cath that showed multivessel coronary disease.  He underwent bypass surgery with a LIMA to the LAD, saphenous vein graft to the obtuse marginal and saphenous vein graft to the distal right coronary.  Postop he had A-fib and was discharged on amiodarone.  He has other risk factors such as hypertension, hyperlipidemia, type 2 diabetes mellitus, chronic diastolic heart failure, ophthalmic hemorrhage, pulmonary hypertension and obstructive sleep apnea.  He has been on BiPAP in the past but has had trouble tolerating it he was actually scheduled for sleep study outpatient [facility].    He got admitted May 30 with hallucinations restlessness and painful with urination.  He was diagnosed with urosepsis he had some sinus tachycardia his lactic acid was elevated, leukocytosis of 30,000.  He has been treated with antibiotics.  He required oxygen and his potassium was also high which was treated.  Nephrology has been following him for that.  Yesterday he went into A-fib with rapid ventricular rate.  He was given Lopressor in the night but his heart rate remained elevated and he got hypotensive.  He was given 250 mcg bolus of digoxin today without any response.   He has been a little hypotensive ever since.  He is asymptomatic except he is pleasantly confused today.  He has a wound on his lower extremity of the left that is also concerning for infection.  He is being followed by infectious disease.    Past Medical History:   Diagnosis Date    AMENA (acute kidney injury) 12/03/2020    Alcoholism 1989    not since 1998    CORI positive     Anemia     Anxiety     Ataxia     Atypical chest pain 04/19/2022    SEEN AT [facility] ER    Balance disorder     Carotid stenosis 3/28/2024    Cataract     BILATERAL, S/P EXTRACTION    Cervical radiculopathy 11/11/2019    SEEN AT  [facility] ER    Cervical spinal cord compression     Chronic diastolic (congestive) heart failure     Chronic kidney disease     STAGE 3, FOLLOWED BY DR. VIVAR    Chronic pancreatitis     Closed left subtrochanteric femur fracture 12/01/2020    ADMITTED TO [facility]    Closed nondisplaced intertrochanteric fracture of left femur 12/01/2020    ADMITTED TO [facility]    Colon polyps     FOLLOWED BY DR. AVTAR JEONG    Constipation     Contracture, right hand     Coronary artery disease     CABG 7/2019    COVID-19 07/2022    DDD (degenerative disc disease), cervical     Diabetes mellitus, type II     IDDM    Diabetic retinopathy     Difficulty walking     Dysphagia     Elevated brain natriuretic peptide (BNP) level 08/2014    Elevated LFTs 03/2021    Erectile dysfunction     Fissure, anal 2022    Foot drop     Fuchs' corneal dystrophy of right eye     Glaucoma     BILATERAL    History of alcohol abuse     Hyperlipidemia     Hypersomnia     Hypertension     Hypertensive urgency 02/25/2020    ADMITTED TO [facility]    Insomnia     Kidney stones     LV dysfunction 06/2016    Lyme disease     Lymphadenopathy syndrome 05/2021    Macular edema     BILATERAL    Myocardial infarction 11/05/2019    NSTEMI, ADMITTED TO [facility]    Myocardial infarction 07/12/2019    NSTEMI, ADMITTED TO [facility]    Neuropathy in diabetes     Non-celiac gluten sensitivity     Orthostasis   "   Osteoporosis     Oxygen dependent     PAD (peripheral artery disease)     PNA (pneumonia) 06/2016    LEFT LOBE    Polyneuropathy     PTSD (post-traumatic stress disorder)     Pulmonary hypertension     Pulmonary nodule     Senile ectropion of both lower eyelids 02/2022    Sepsis 12/16/2020    D/T UTI, ADMITTED TO [facility]    Sleep apnea     STATES DOESN'T USE BIPAP OR CPAP    Spinal stenosis in cervical region     SEVERE-LIMITED ROM    Stroke 2012    \"slight stroke\"    Syncope and collapse 07/06/2019    ADMITTED TO [facility]    Urinary retention 04/05/2021    SEEN AT [facility] ER    Vision loss     Vitamin D deficiency        Past Surgical History:   Procedure Laterality Date    CARDIAC CATHETERIZATION N/A 07/15/2019    Procedure: Coronary angiography;  Surgeon: Carrie Price MD;  Location:  [facility];  Service: Cardiovascular    CARDIAC CATHETERIZATION N/A 07/15/2019    Procedure: Left Heart Cath;  Surgeon: Carrie Price MD;  Location:  [facility];  Service: Cardiovascular    CARDIAC CATHETERIZATION N/A 07/15/2019    Procedure: Left ventriculography;  Surgeon: Carrie Price MD;  Location:  [facility];  Service: Cardiovascular    CARDIAC CATHETERIZATION  07/15/2019    Procedure: Functional Flow Eva;  Surgeon: Carrie Price MD;  Location:  [facility];  Service: Cardiovascular    CARDIAC CATHETERIZATION N/A 11/06/2019    Procedure: Right and Left Heart Cath;  Surgeon: Mya Smith MD;  Location:  [facility];  Service: Cardiovascular    CARDIAC CATHETERIZATION N/A 11/06/2019    Procedure: Coronary angiography;  Surgeon: Mya Smith MD;  Location:  [facility];  Service: Cardiovascular    CARDIAC SURGERY      CATARACT EXTRACTION Left 2014    CATARACT EXTRACTION Right 11/2016    PHACO/IOL, DR. LEN DENTON    COLONOSCOPY N/A 03/16/2023    ENTIRE COLON WNL, RESCOPE IN 5 YRS, DR. LINDA LIZARRAGA AT [facility]    " COLONOSCOPY W/ POLYPECTOMY N/A 01/02/2015    A FEW DIVERTICULA IN SIGMOID, 6 MM TUBULOVILLOUS ADENOMA POLYP IN RECTUM, SMALL HEMORRHOIDS, MELANOSIS COLI, DR. AVTAR JEONG AT [facility]    CORONARY ARTERY BYPASS GRAFT N/A 07/18/2019    Procedure: INTRAOPERATIVE SHOAIB; STERNOTOMY CORONARY ARTERY BYPASS x 3  USING LEFT INTERNAL MAMMARY ARTERY GRAFT UTILIZING ENDOSCOPICALLY HARVESTED RIGHT GREATER SAPHENOUS VEIN AND PRP.;  Surgeon: Bill Devi MD;  Location: [facility] OR;  Service: Cardiothoracic    CYSTOSCOPY BLADDER STONE LITHOTRIPSY N/A     DENTAL PROCEDURE Bilateral     3 surgeries inder implants    FEMUR IM NAILING/RODDING Left 12/03/2020    Procedure: LEFT HIP INTRAMEDULLARY NAIL;  Surgeon: Niraj Ravi MD;  Location: [facility] OR;  Service: Orthopedic Spine;  Laterality: Left;    INCISION AND DRAINAGE PERIRECTAL ABSCESS N/A 10/04/2023    Procedure: Incision and drainage of perianal and buttock abscess;  Surgeon: Herbie Villatoro MD;  Location: [facility] OR;  Service: General;  Laterality: N/A;    INGUINAL HERNIA REPAIR Bilateral     TOENAIL EXCISION  06/2022    TONSILLECTOMY Bilateral     TOTAL HIP ARTHROPLASTY REVISION Left 01/11/2022    Procedure: TOTAL HIP ARTHROPLASTY REVISION- POSTERIOR;  Surgeon: Jurgen Candelaria II, MD;  Location: [facility] OR;  Service: Orthopedics;  Laterality: Left;    VASECTOMY N/A        Prior to Admission medications    Medication Sig Start Date End Date Taking? Authorizing Provider   aspirin 81 MG EC tablet Take 1 tablet by mouth Every Night.   Yes Heri Rinaldi MD   brimonidine (ALPHAGAN) 0.2 % ophthalmic solution Administer 1 drop to both eyes 2 (Two) Times a Day.   Yes Heri Rinaldi MD   bumetanide (BUMEX) 1 MG tablet Take 1 tablet by mouth Daily.   Yes Heri Rinaldi MD   Cholecalciferol (Vitamin D-3) 125 MCG (5000 UT) tablet Take 1 tablet by mouth Daily.   Yes Heri Rinaldi MD   DULoxetine (CYMBALTA) 30 MG  capsule Take 1 capsule by mouth Every Night. 1/16/24  Yes Heri Rinaldi MD   Insulin Glargine, 2 Unit Dial, (TOUJEO) 300 UNIT/ML solution pen-injector injection Inject 24 Units under the skin into the appropriate area as directed Daily.   Yes Heri Rinaldi MD   insulin lispro (humaLOG) 100 UNIT/ML injection Inject 0-14 Units under the skin into the appropriate area as directed 3 (Three) Times a Day Before Meals.  Patient taking differently: Inject 0-14 Units under the skin into the appropriate area as directed 3 (Three) Times a Day Before Meals. SLIDING SCALE  100-150 - 4 units  151-200 - 5 units  201-250 - 6 units  251-300 - 7 units  301-350 - 8 units  351-400 - 9 units  401+ - 10 units 12/7/20  Yes Amador Valverde MD   latanoprost (XALATAN) 0.005 % ophthalmic solution Administer 1 drop to both eyes every night at bedtime. 1/16/23  Yes Heri Rinaldi MD   multivitamin with minerals (MULTIVITAMIN ADULTS PO) Take 1 tablet by mouth Daily.   Yes Heri Rinaldi MD   testosterone (ANDROGEL) 25 MG/2.5GM (1%) gel gel Place 25 mg on the skin as directed by provider 2 (Two) Times a Week.   Yes Heri Rinaldi MD   traMADol (ULTRAM) 50 MG tablet Take 1 tablet by mouth At Night As Needed. 10/16/23  Yes Heri Rinaldi MD   Magnesium 400 MG capsule Take 400 mg by mouth As Needed.    Heri Rinaldi MD   sildenafil (REVATIO) 20 MG tablet Take 1 tablet by mouth As Needed.    Heri Rianldi MD        Allergies as of 05/30/2024 - Reviewed 05/30/2024   Allergen Reaction Noted    Ace inhibitors Angioedema 10/27/2015    Angiotensin receptor blockers Angioedema and Unknown (See Comments) 03/01/2016    Dapagliflozin Other (See Comments) 09/16/2023    Losartan Angioedema 03/01/2016    Seroquel [quetiapine] Hallucinations 06/16/2021    Xanax [alprazolam] Unknown - High Severity 01/09/2021    Amlodipine Swelling 03/01/2020    Ativan [lorazepam] Hallucinations 07/15/2019    Baclofen  "Hallucinations 2023    Misc. sulfonamide containing compounds Unknown (See Comments) 2023    Minoxidil Confusion 2019    Tetracycline Rash 2014       Social History     Socioeconomic History    Marital status:    Tobacco Use    Smoking status: Former     Current packs/day: 0.00     Average packs/day: 0.8 packs/day for 16.0 years (12.0 ttl pk-yrs)     Types: Cigarettes     Start date: 1984     Quit date: 2000     Years since quittin.4     Passive exposure: Past    Smokeless tobacco: Never    Tobacco comments:     Start age:39/Stopping age:47, 3/4 PPD   Vaping Use    Vaping status: Never Used   Substance and Sexual Activity    Alcohol use: Not Currently     Comment: none since     Drug use: No    Sexual activity: Not Currently     Birth control/protection: Vasectomy       Family History   Problem Relation Age of Onset    Heart disease Mother     Hypertension Mother     Hyperlipidemia Mother     Depression Mother     Cancer Mother         uterine    Arrhythmia Mother     Uterine cancer Mother     Mental illness Mother     Migraines Mother     Hyperlipidemia Father     Heart disease Father     Hypertension Father     Kidney disease Father     Thyroid disease Father     Heart failure Father     Depression Sister     Alcohol abuse Brother     Thyroid disease Paternal Uncle     Lung disease Paternal Uncle     Stroke Maternal Grandmother     Glaucoma Maternal Grandmother     Heart attack Paternal Grandmother     Stroke Paternal Grandmother     Alcohol abuse Paternal Grandfather     Malig Hyperthermia Neg Hx        REVIEW OF SYSTEMS:   Unable to be performed due to confusion       Objective:   Temp:  [97.5 °F (36.4 °C)-98.8 °F (37.1 °C)] 97.5 °F (36.4 °C)  Heart Rate:  [] 167  Resp:  [18] 18  BP: ()/() 109/66  Body mass index is 31.02 kg/m².  Flowsheet Rows      Flowsheet Row First Filed Value   Admission Height 175.3 cm (69\") Documented at 2024 0350 "   Admission Weight 86.6 kg (191 lb) Documented at 05/30/2024 0350          Vitals:    06/04/24 0944   BP: 109/66   Pulse:    Resp:    Temp:    SpO2:        Physical Exam:   General Appearance:    Awake alert and oriented in no acute distress.   Color:  Skin:  Neuro:  HEENT:    Lungs:     Pink  Warm and dry  No focal, motor or sensory deficits  Neck supple, pupils equal, round and reactive. No JVD, No Bruit  Diminished in the bases to auscultation,respirations regular, even and                  unlabored    Heart:  Irregularly irregular/tachycardic rate and rhythm, S1 and S2, no murmur, no gallop, no rub.  1-2+ edema, DP/PT pulses are 2+   Chest Wall:    No abnormalities observed   Abdomen:     Normal bowel sounds, no masses, no organomegaly, soft        non-tender, non-distended, no guarding, no ascites noted   Extremities:   Moves all extremities well, no edema, no cyanosis, no redness             ECHOCARDIOGRAM 6/3/24  Interpretation Summary         Left ventricular systolic function is hyperdynamic (EF > 70%). Calculated left ventricular EF = 75% Global longitudinal LV strain (GLS) = -17.8%. Left ventricle strain data was reviewed by the physician and found to be accurate. Global longitudinal LV strain is normal however there is regional abnormality with apical sparing suggestive of amyloid heart disease. However there is also basal ptal hypertrophy suggestive of hypertrophic cardiomyopathy. Wall motion abnormality is also noted in the basal septum and cannot rule out ischemic component.Consider additional imaging such as cardiac MRI and further evaluation also for amyloid heart disease as clinically indicated. The left ventricular cavity is small in size. Left ventricular wall thickness is consistent with moderate to severe septal asymmetric hypertrophy. There is hypokinesis of the left ventricular basal septum. Left ventricular diastolic function is consistent with (grade II w/high LAP) pseudonormalization.     The left atrial cavity is mildly dilated.    No aortic valve regurgitation or stenosis is present. The aortic valve is abnormal in structure. There is mild thickening of the aortic valve.    There is mild, bileaflet mitral valve thickening present. Trace mitral valve regurgitation is present. No significant mitral valve stenosis is present.    Moderate tricuspid valve regurgitation is present. Estimated right ventricular systolic pressure from tricuspid regurgitation is markedly elevated (>55 mmHg). Calculated right ventricular systolic pressure from tricuspid regurgitation is 74 mmHg.       I personally viewed and interpreted the patient's EKG/Telemetry data        AssessmentPLan:  Urosepsis- being managed by infectious disease  New onset afib with RVR- poor response to lopressor and digoxin. Start amiodarone bolus and gtt per protocol. Add TSH to labs  Coronary artery disease- s/p CABG 2019 on asa, BB, not on statin because his LDL has been controlled. Managed by primary. Abnormal EKG but unchanged ST segment changes  Essential HTN- controlled  Pulmonary hypertension RVSP 55mmHg on echo on revatio  Mild bilateral carotid disease        LEIDY Hall  06/04/24  11:13 EDT.  Electronically signed by LEIDY Hall, 06/04/24, 11:13 AM EDT.

## 2024-06-04 NOTE — NURSING NOTE
0400: RN received call pt hr  was in 150's after previously being in the 80's NSR    On assessment, pt was sleeping, denies chest pain or short of breath    /80 (96)    EKG obtained and [facility] psged    RN spoke with Emerald Bledsoe, placing orders for IV lopressor 5 mg one time        0530: -160  BP 91/67 (77)  [facility] paged    0545: Cardiology paged and consulted     0630: -160  Cardiology paged

## 2024-06-04 NOTE — THERAPY TREATMENT NOTE
Patient Name: Dilip Verma  : 1950    MRN: [id number]                              Today's Date: 2024       Admit Date: 2024    Visit Dx:     ICD-10-CM ICD-9-CM   1. Acute respiratory failure with hypoxia  J96.01 518.81   2. Sepsis without acute organ dysfunction, due to unspecified organism  A41.9 038.9     995.91   3. Hyperkalemia  E87.5 276.7   4. Metabolic encephalopathy  G93.41 348.31   5. Acute UTI  N39.0 599.0   6. Hyperglycemia due to diabetes mellitus  E11.65 250.02     Patient Active Problem List   Diagnosis    Anxiety    Colon polyp    Type 2 diabetes mellitus with hyperglycemia, with long-term current use of insulin    Erectile dysfunction    Hyperlipidemia    Type 2 acute myocardial infarction    CAD (coronary artery disease)    Hx of CABG    Chronic diastolic heart failure    Hypersomnia due to medical condition    CSA (central sleep apnea)    Periodic breathing    HTN (hypertension)    History of stroke    CKD (chronic kidney disease) stage 3, GFR 30-59 ml/min    Urinary retention    Acute on chronic renal failure    Acute UTI (urinary tract infection)    Acute on chronic diastolic (congestive) heart failure    Bacteriuria    Rectal pain    Status post total replacement of hip    Vitamin D deficiency    Post-acute COVID-19 syndrome    Insulin dose changed    Arthropathy associated with neurological disorder    Personal history of colonic polyps    Type 2 diabetes mellitus with diabetic neuropathy, with long-term current use of insulin    Cervical spinal stenosis    Abscess of skin    Overweight    Cervical stenosis of spine    Spinal stenosis, lumbar region, without neurogenic claudication    Medically noncompliant    Chronic heart failure with preserved ejection fraction (HFpEF)    Carotid stenosis    Peripheral arterial disease    Sepsis    Hyperkalemia    Metabolic encephalopathy    AMENA (acute kidney injury)     Past Medical History:   Diagnosis Date    AMENA (acute kidney injury)  12/03/2020    Alcoholism 1989    not since 1998    CORI positive     Anemia     Anxiety     Ataxia     Atypical chest pain 04/19/2022    SEEN AT [facility] ER    Balance disorder     Carotid stenosis 3/28/2024    Cataract     BILATERAL, S/P EXTRACTION    Cervical radiculopathy 11/11/2019    SEEN AT  [facility] ER    Cervical spinal cord compression     Chronic diastolic (congestive) heart failure     Chronic kidney disease     STAGE 3, FOLLOWED BY DR. VIVAR    Chronic pancreatitis     Closed left subtrochanteric femur fracture 12/01/2020    ADMITTED TO [facility]    Closed nondisplaced intertrochanteric fracture of left femur 12/01/2020    ADMITTED TO [facility]    Colon polyps     FOLLOWED BY DR. AVTAR JEONG    Constipation     Contracture, right hand     Coronary artery disease     CABG 7/2019    COVID-19 07/2022    DDD (degenerative disc disease), cervical     Diabetes mellitus, type II     IDDM    Diabetic retinopathy     Difficulty walking     Dysphagia     Elevated brain natriuretic peptide (BNP) level 08/2014    Elevated LFTs 03/2021    Erectile dysfunction     Fissure, anal 2022    Foot drop     Fuchs' corneal dystrophy of right eye     Glaucoma     BILATERAL    History of alcohol abuse     Hyperlipidemia     Hypersomnia     Hypertension     Hypertensive urgency 02/25/2020    ADMITTED TO [facility]    Insomnia     Kidney stones     LV dysfunction 06/2016    Lyme disease     Lymphadenopathy syndrome 05/2021    Macular edema     BILATERAL    Myocardial infarction 11/05/2019    NSTEMI, ADMITTED TO [facility]    Myocardial infarction 07/12/2019    NSTEMI, ADMITTED TO [facility]    Neuropathy in diabetes     Non-celiac gluten sensitivity     Orthostasis     Osteoporosis     Oxygen dependent     PAD (peripheral artery disease)     PNA (pneumonia) 06/2016    LEFT LOBE    Polyneuropathy     PTSD (post-traumatic stress disorder)     Pulmonary hypertension     Pulmonary nodule     Senile ectropion of both lower eyelids 02/2022    Sepsis 12/16/2020    D/T UTI,  "ADMITTED TO [facility]    Sleep apnea     STATES DOESN'T USE BIPAP OR CPAP    Spinal stenosis in cervical region     SEVERE-LIMITED ROM    Stroke 2012    \"slight stroke\"    Syncope and collapse 07/06/2019    ADMITTED TO [place]    Urinary retention 04/05/2021    SEEN AT [facility] ER    Vision loss     Vitamin D deficiency      Past Surgical History:   Procedure Laterality Date    CARDIAC CATHETERIZATION N/A 07/15/2019    Procedure: Coronary angiography;  Surgeon: Carrie Price MD;  Location:  [facility];  Service: Cardiovascular    CARDIAC CATHETERIZATION N/A 07/15/2019    Procedure: Left Heart Cath;  Surgeon: Carrie Price MD;  Location:  [facility];  Service: Cardiovascular    CARDIAC CATHETERIZATION N/A 07/15/2019    Procedure: Left ventriculography;  Surgeon: Carrie Price MD;  Location:  [facility];  Service: Cardiovascular    CARDIAC CATHETERIZATION  07/15/2019    Procedure: Functional Flow [place];  Surgeon: Carrie Price MD;  Location:  [facility];  Service: Cardiovascular    CARDIAC CATHETERIZATION N/A 11/06/2019    Procedure: Right and Left Heart Cath;  Surgeon: Mya Smith MD;  Location:  [facility];  Service: Cardiovascular    CARDIAC CATHETERIZATION N/A 11/06/2019    Procedure: Coronary angiography;  Surgeon: Mya Smith MD;  Location:  [facility];  Service: Cardiovascular    CARDIAC SURGERY      CATARACT EXTRACTION Left 2014    CATARACT EXTRACTION Right 11/2016    PHACO/IOL, DR. LEN DENTON    COLONOSCOPY N/A 03/16/2023    ENTIRE COLON WNL, RESCOPE IN 5 YRS, DR. LINDA LIZARRAGA AT [facility]    COLONOSCOPY W/ POLYPECTOMY N/A 01/02/2015    A FEW DIVERTICULA IN SIGMOID, 6 MM TUBULOVILLOUS ADENOMA POLYP IN RECTUM, SMALL HEMORRHOIDS, MELANOSIS COLI, DR. AVTAR JEONG AT [facility]    CORONARY ARTERY BYPASS GRAFT N/A 07/18/2019    Procedure: INTRAOPERATIVE SHOAIB; STERNOTOMY CORONARY ARTERY BYPASS x " 3  USING LEFT INTERNAL MAMMARY ARTERY GRAFT UTILIZING ENDOSCOPICALLY HARVESTED RIGHT GREATER SAPHENOUS VEIN AND PRP.;  Surgeon: Bill Devi MD;  Location: [facility] MAIN OR;  Service: Cardiothoracic    CYSTOSCOPY BLADDER STONE LITHOTRIPSY N/A     DENTAL PROCEDURE Bilateral     3 surgeries inder implants    FEMUR IM NAILING/RODDING Left 12/03/2020    Procedure: LEFT HIP INTRAMEDULLARY NAIL;  Surgeon: Niraj Ravi MD;  Location: [facility] MAIN OR;  Service: Orthopedic Spine;  Laterality: Left;    INCISION AND DRAINAGE PERIRECTAL ABSCESS N/A 10/04/2023    Procedure: Incision and drainage of perianal and buttock abscess;  Surgeon: Herbie Villatoro MD;  Location: [facility] MAIN OR;  Service: General;  Laterality: N/A;    INGUINAL HERNIA REPAIR Bilateral     TOENAIL EXCISION  06/2022    TONSILLECTOMY Bilateral     TOTAL HIP ARTHROPLASTY REVISION Left 01/11/2022    Procedure: TOTAL HIP ARTHROPLASTY REVISION- POSTERIOR;  Surgeon: Jurgen Candelaria II, MD;  Location: [facility] MAIN OR;  Service: Orthopedics;  Laterality: Left;    VASECTOMY N/A       General Information       Row Name 06/04/24 1545          Physical Therapy Time and Intention    Document Type therapy note (daily note)  -PC     Mode of Treatment physical therapy  -PC       Row Name 06/04/24 1545          General Information    Existing Precautions/Restrictions fall;oxygen therapy device and L/min  -PC               User Key  (r) = Recorded By, (t) = Taken By, (c) = Cosigned By      Initials Name Provider Type    PC Gemma Hamilton PT Physical Therapist                   Mobility       Row Name 06/04/24 1545          Bed Mobility    Supine-Sit [place] (Bed Mobility) moderate assist (50% patient effort)  -PC       Row Name 06/04/24 1545          Sit-Stand Transfer    Sit-Stand [place] (Transfers) minimum assist (75% patient effort);2 person assist  -PC     Assistive Device (Sit-Stand Transfers) walker, front-wheeled  -PC     Comment,  (Sit-Stand Transfer) sit to stand from elevated bed min assist of 1, sit to stand from low chair, mod a x 1 or min a x 2  -PC       Row Name 06/04/24 1545          Gait/Stairs (Locomotion)    [place] Level (Gait) minimum assist (75% patient effort);moderate assist (50% patient effort);1 person assist;1 person to manage equipment  2 people for safety  -PC     Distance in Feet (Gait) 12  3 ft, 8 ft, 12 ft  -PC     Deviations/Abnormal Patterns (Gait) gait speed decreased;farhana decreased;stride length decreased  -PC     Bilateral Gait Deviations forward flexed posture;heel strike decreased;knee hyperextension  -PC               User Key  (r) = Recorded By, (t) = Taken By, (c) = Cosigned By      Initials Name Provider Type    PC Gemma Hamilton, PT Physical Therapist                   Obj/Interventions       Row Name 06/04/24 1551          Balance    Static Sitting Balance standby assist  -PC     Dynamic Sitting Balance minimal assist  -PC     Position, Sitting Balance sitting edge of bed  -PC     Static Standing Balance minimal assist  -PC     Dynamic Standing Balance moderate assist  -PC     Position/Device Used, Standing Balance walker, rolling  -PC               User Key  (r) = Recorded By, (t) = Taken By, (c) = Cosigned By      Initials Name Provider Type    PC Gemma Hamilton, PT Physical Therapist                   Goals/Plan    No documentation.                  Clinical Impression       Row Name 06/04/24 1552          Pain    Pretreatment Pain Rating 0/10 - no pain  -PC       Row Name 06/04/24 1552          Plan of Care Review    Plan of Care Reviewed With patient;spouse  -PC     Progress improving  -PC     Outcome Evaluation Pt did show improvement today with mobility, required less assist and was able to take 3 short walks with his walker and assist, gait quality is poor with difficulty grasping walker RUE, B foot drop and B knee hyperextension, PT will cont to follow, spouse wants pt to return home  where she cares for him  -PC       Row Name 06/04/24 1552          Positioning and Restraints    Pre-Treatment Position in bed  -PC     Post Treatment Position chair  -PC     In Chair sitting;call light within reach;encouraged to call for assist;exit alarm on;with family/caregiver  -PC               User Key  (r) = Recorded By, (t) = Taken By, (c) = Cosigned By      Initials Name Provider Type    PC Gemma Hamilton, PT Physical Therapist                   Outcome Measures       Row Name 06/04/24 1556 06/04/24 0800       How much help from another person do you currently need...    Turning from your back to your side while in flat bed without using bedrails? 3  -PC 3  -TH    Moving from lying on back to sitting on the side of a flat bed without bedrails? 3  -PC 3  -TH    Moving to and from a bed to a chair (including a wheelchair)? 3  -PC 3  -TH    Standing up from a chair using your arms (e.g., wheelchair, bedside chair)? 3  -PC 2  -TH    Climbing 3-5 steps with a railing? 2  -PC 2  -TH    To walk in hospital room? 2  -PC 2  -TH    AM-PAC 6 Clicks Score (PT) 16  -PC 15  -TH    Highest Level of Mobility Goal 5 --> Static standing  -PC 4 --> Transfer to chair/commode  -TH              User Key  (r) = Recorded By, (t) = Taken By, (c) = Cosigned By      Initials Name Provider Type    PC Gemma Hamilton, PT Physical Therapist     Melani Salinas RN Registered Nurse                                 Physical Therapy Education       Title: PT OT SLP Therapies (Done)       Topic: Physical Therapy (Done)       Point: Mobility training (Done)       Learning Progress Summary             Patient Acceptance, E,D, DU,NR by PC at 6/4/2024 1557    Acceptance, E,D, DU by PC at 6/2/2024 1427    Acceptance, E,TB,D, VU by CB at 6/1/2024 2218    Acceptance, E,D, DU by PC at 5/31/2024 1612   Family Acceptance, E,TB,D, VU by CB at 6/1/2024 2218                         Point: Home exercise program (Done)       Learning Progress Summary              Patient Acceptance, E,D, DU,NR by PC at 6/4/2024 1557    Acceptance, E,D, DU by PC at 6/2/2024 1427    Acceptance, E,TB,D, VU by CB at 6/1/2024 2218    Acceptance, E,D, DU by PC at 5/31/2024 1612   Family Acceptance, E,TB,D, VU by CB at 6/1/2024 2218                         Point: Body mechanics (Done)       Learning Progress Summary             Patient Acceptance, E,D, DU,NR by PC at 6/4/2024 1557    Acceptance, E,D, DU by PC at 6/2/2024 1427    Acceptance, E,TB,D, VU by CB at 6/1/2024 2218    Acceptance, E,D, DU by PC at 5/31/2024 1612   Family Acceptance, E,TB,D, VU by CB at 6/1/2024 2218                         Point: Precautions (Done)       Learning Progress Summary             Patient Acceptance, E,D, DU,NR by PC at 6/4/2024 1557    Acceptance, E,D, DU by PC at 6/2/2024 1427    Acceptance, E,TB,D, VU by CB at 6/1/2024 2218    Acceptance, E,D, DU by PC at 5/31/2024 1612   Family Acceptance, E,TB,D, VU by CB at 6/1/2024 2218                                         User Key       Initials Effective Dates Name Provider Type Discipline    PC 06/16/21 -  Gemma Hamilton, PT Physical Therapist PT    CB 02/21/24 -  Murphy Herndon, RN Registered Nurse Nurse                  PT Recommendation and Plan  Planned Therapy Interventions (PT): balance training, bed mobility training, gait training, transfer training, strengthening  Plan of Care Reviewed With: patient, spouse  Progress: improving  Outcome Evaluation: Pt did show improvement today with mobility, required less assist and was able to take 3 short walks with his walker and assist, gait quality is poor with difficulty grasping walker RUE, B foot drop and B knee hyperextension, PT will cont to follow, spouse wants pt to return home where she cares for him     Time Calculation:         PT Charges       Row Name 06/04/24 1558             Time Calculation    Start Time 1500  -PC      Stop Time 1535  -PC      Time Calculation (min) 35 min  -PC      PT Received  On 06/04/24  -PC      PT - Next Appointment 06/05/24  -PC                User Key  (r) = Recorded By, (t) = Taken By, (c) = Cosigned By      Initials Name Provider Type    PC Gemma Hamilton PT Physical Therapist                  Therapy Charges for Today       Code Description Service Date Service Provider Modifiers Qty    [id number] HC PT THER PROC EA 15 MIN 6/4/2024 Gemma Hamilton PT GP 2            PT G-Codes  Outcome Measure Options: AM-PAC 6 Clicks Daily Activity (OT)  AM-PAC 6 Clicks Score (PT): 16  AM-PAC 6 Clicks Score (OT): 15  PT Discharge Summary  Anticipated Discharge Disposition (PT): home with 24/7 care, home with home health, skilled nursing facility (home with spouse v SNF depending on progress with mobility)    Gemma Hamilton, PT  6/4/2024

## 2024-06-04 NOTE — PLAN OF CARE
Goal Outcome Evaluation:  Plan of Care Reviewed With: patient, spouse        Progress: improving  Outcome Evaluation: Pt did show improvement today with mobility, required less assist and was able to take 3 short walks with his walker and assist, gait quality is poor with difficulty grasping walker RUE, B foot drop and B knee hyperextension, PT will cont to follow, spouse wants pt to return home where she cares for him      Anticipated Discharge Disposition (PT): home with 24/7 care, home with home health, skilled nursing facility (home with spouse v SNF depending on progress with mobility)

## 2024-06-04 NOTE — PROGRESS NOTES
PROGRESS NOTE      Patient Name: Dilip Verma  : 1950  MRN: [id number]  Primary Care Physician: Fernando Camargo DO  Date of admission: 2024    Patient Care Team:  Fernando Camargo DO as PCP - General (Family Medicine)  Morris Cai MD as Consulting Physician (Sleep Medicine)  Michaela Hylton MD as Consulting Physician (Cardiology)  Hardy Banerjee MD as Consulting Physician (Ophthalmology)  Chula Christianson MD as Consulting Physician (Ophthalmology)  Jason Sherman MD (Endocrinology)  Perla Mayen MD as Consulting Physician (Nephrology)  Federico Hebert MD as Consulting Physician (Pulmonary Disease)  Niraj Ravi MD as Consulting Physician (Orthopedic Surgery)  Papa Velasco MD as Consulting Physician (Urology)  Terese Yost MD as Consulting Physician (Gastroenterology)  Aracelis Ball MD as Consulting Physician (Colon and Rectal Surgery)  [facility] as Primary Care Provider        Reason for Follow up:     AMENA, CKD III      Subjective:     Seen and examined, no new complaints    Review of systems:  Constitutional: Seen and examined, alert awake, no distress  HEENT:  No headache, otalgia, itchy eyes, nasal discharge or sore throat.  Cardiac:  No chest pain, dyspnea, orthopnea or PND.  Chest:              No cough, phlegm or wheezing.  Abdomen:  No abdominal pain, nausea or vomiting.  Neuro:  No focal weakness, abnormal movements orseizure like activity.  :   Dysuria  ROS was otherwise negative except as mentioned in the [place].       Personal History:     Past Medical History:   Past Medical History:   Diagnosis Date    AMENA (acute kidney injury) 2020    Alcoholism 1989    not since     CORI positive     Anemia     Anxiety     Ataxia     Atypical chest pain 2022    SEEN AT [facility] ER    Balance disorder     Carotid stenosis 3/28/2024    Cataract     BILATERAL, S/P EXTRACTION    Cervical radiculopathy 2019    SEEN AT  [facility]  "ER    Cervical spinal cord compression     Chronic diastolic (congestive) heart failure     Chronic kidney disease     STAGE 3, FOLLOWED BY DR. VIVAR    Chronic pancreatitis     Closed left subtrochanteric femur fracture 12/01/2020    ADMITTED TO [facility]    Closed nondisplaced intertrochanteric fracture of left femur 12/01/2020    ADMITTED TO [facility]    Colon polyps     FOLLOWED BY DR. AVTAR JEONG    Constipation     Contracture, right hand     Coronary artery disease     CABG 7/2019    COVID-19 07/2022    DDD (degenerative disc disease), cervical     Diabetes mellitus, type II     IDDM    Diabetic retinopathy     Difficulty walking     Dysphagia     Elevated brain natriuretic peptide (BNP) level 08/2014    Elevated LFTs 03/2021    Erectile dysfunction     Fissure, anal 2022    Foot drop     Fuchs' corneal dystrophy of right eye     Glaucoma     BILATERAL    History of alcohol abuse     Hyperlipidemia     Hypersomnia     Hypertension     Hypertensive urgency 02/25/2020    ADMITTED TO [facility]    Insomnia     Kidney stones     LV dysfunction 06/2016    Lyme disease     Lymphadenopathy syndrome 05/2021    Macular edema     BILATERAL    Myocardial infarction 11/05/2019    NSTEMI, ADMITTED TO [facility]    Myocardial infarction 07/12/2019    NSTEMI, ADMITTED TO [facility]    Neuropathy in diabetes     Non-celiac gluten sensitivity     Orthostasis     Osteoporosis     Oxygen dependent     PAD (peripheral artery disease)     PNA (pneumonia) 06/2016    LEFT LOBE    Polyneuropathy     PTSD (post-traumatic stress disorder)     Pulmonary hypertension     Pulmonary nodule     Senile ectropion of both lower eyelids 02/2022    Sepsis 12/16/2020    D/T UTI, ADMITTED TO [facility]    Sleep apnea     STATES DOESN'T USE BIPAP OR CPAP    Spinal stenosis in cervical region     SEVERE-LIMITED ROM    Stroke 2012    \"slight stroke\"    Syncope and collapse 07/06/2019    ADMITTED TO [facility]    Urinary retention 04/05/2021    SEEN AT [facility] ER    Vision loss     Vitamin D " deficiency        Surgical History:      Past Surgical History:   Procedure Laterality Date    CARDIAC CATHETERIZATION N/A 07/15/2019    Procedure: Coronary angiography;  Surgeon: Carrie Price MD;  Location:  [facility]ASIVE [facility];  Service: Cardiovascular    CARDIAC CATHETERIZATION N/A 07/15/2019    Procedure: Left Heart Cath;  Surgeon: Carrie Price MD;  Location:  [facility];  Service: Cardiovascular    CARDIAC CATHETERIZATION N/A 07/15/2019    Procedure: Left ventriculography;  Surgeon: Carrie Price MD;  Location:  [facility];  Service: Cardiovascular    CARDIAC CATHETERIZATION  07/15/2019    Procedure: Functional Flow [place];  Surgeon: Carrie Price MD;  Location:  [facility];  Service: Cardiovascular    CARDIAC CATHETERIZATION N/A 11/06/2019    Procedure: Right and Left Heart Cath;  Surgeon: Mya Smith MD;  Location:  [facility];  Service: Cardiovascular    CARDIAC CATHETERIZATION N/A 11/06/2019    Procedure: Coronary angiography;  Surgeon: Mya Smith MD;  [facility];  Service: Cardiovascular    CARDIAC SURGERY      CATARACT EXTRACTION Left 2014    CATARACT EXTRACTION Right 11/2016    PHACO/IOL, DR. LEN DENTON    COLONOSCOPY N/A 03/16/2023    ENTIRE COLON WNL, RESCOPE IN 5 YRS, DR. LINDA LIZARRAGA AT [facility]    COLONOSCOPY W/ POLYPECTOMY N/A 01/02/2015    A FEW DIVERTICULA IN SIGMOID, 6 MM TUBULOVILLOUS ADENOMA POLYP IN RECTUM, SMALL HEMORRHOIDS, MELANOSIS COLI, DR. AVTAR JEONG AT [place] [facility]    CORONARY ARTERY BYPASS GRAFT N/A 07/18/2019    Procedure: INTRAOPERATIVE SHOAIB; STERNOTOMY CORONARY ARTERY BYPASS x 3  USING LEFT INTERNAL MAMMARY ARTERY GRAFT UTILIZING ENDOSCOPICALLY HARVESTED RIGHT GREATER SAPHENOUS VEIN AND PRP.;  Surgeon: Bill Devi MD;  Location: [facility] MAIN OR;  Service: Cardiothoracic    CYSTOSCOPY BLADDER STONE LITHOTRIPSY N/A     DENTAL PROCEDURE  Bilateral     3 surgeries inder implants    FEMUR IM NAILING/RODDING Left 12/03/2020    Procedure: LEFT HIP INTRAMEDULLARY NAIL;  Surgeon: Niraj Ravi MD;  Location: [facility] OR;  Service: Orthopedic Spine;  Laterality: Left;    INCISION AND DRAINAGE PERIRECTAL ABSCESS N/A 10/04/2023    Procedure: Incision and drainage of perianal and buttock abscess;  Surgeon: Herbie Villatoro MD;  Location: [facility] OR;  Service: General;  Laterality: N/A;    INGUINAL HERNIA REPAIR Bilateral     TOENAIL EXCISION  06/2022    TONSILLECTOMY Bilateral     TOTAL HIP ARTHROPLASTY REVISION Left 01/11/2022    Procedure: TOTAL HIP ARTHROPLASTY REVISION- POSTERIOR;  Surgeon: Jurgen Candelaria II, MD;  Location: [facility] OR;  Service: Orthopedics;  Laterality: Left;    VASECTOMY N/A        Family History: family history includes Alcohol abuse in his brother and paternal grandfather; Arrhythmia in his mother; Cancer in his mother; Depression in his mother and sister; Glaucoma in his maternal grandmother; Heart attack in his paternal grandmother; Heart disease in his father and mother; Heart failure in his father; Hyperlipidemia in his father and mother; Hypertension in his father and mother; Kidney disease in his father; Lung disease in his paternal uncle; Mental illness in his mother; Migraines in his mother; Stroke in his maternal grandmother and paternal grandmother; Thyroid disease in his father and paternal uncle; Uterine cancer in his mother. Otherwise pertinent FHx was reviewed and unremarkable.     Social History:  reports that he quit smoking about 24 years ago. His smoking use included cigarettes. He started smoking about 40 years ago. He has a 12 pack-year smoking history. He has been exposed to tobacco smoke. He has never used smokeless tobacco. He reports that he does not currently use alcohol. He reports that he does not use drugs.    Medications:  Prior to Admission medications    Medication Sig Start  Date End Date Taking? Authorizing Provider   aspirin 81 MG EC tablet Take 1 tablet by mouth Every Night.   Yes Heri Rinaldi MD   brimonidine (ALPHAGAN) 0.2 % ophthalmic solution Administer 1 drop to both eyes 2 (Two) Times a Day.   Yes Heri Rinaldi MD   bumetanide (BUMEX) 1 MG tablet Take 1 tablet by mouth Daily.   Yes Heri Rinaldi MD   Cholecalciferol (Vitamin D-3) 125 MCG (5000 UT) tablet Take 1 tablet by mouth Daily.   Yes Heri Rinaldi MD   DULoxetine (CYMBALTA) 30 MG capsule Take 1 capsule by mouth Every Night. 1/16/24  Yes Heri Rinaldi MD   Insulin Glargine, 2 Unit Dial, (TOUJEO) 300 UNIT/ML solution pen-injector injection Inject 24 Units under the skin into the appropriate area as directed Daily.   Yes Heri Rinaldi MD   insulin lispro (humaLOG) 100 UNIT/ML injection Inject 0-14 Units under the skin into the appropriate area as directed 3 (Three) Times a Day Before Meals.  Patient taking differently: Inject 0-14 Units under the skin into the appropriate area as directed 3 (Three) Times a Day Before Meals. SLIDING SCALE  100-150 - 4 units  151-200 - 5 units  201-250 - 6 units  251-300 - 7 units  301-350 - 8 units  351-400 - 9 units  401+ - 10 units 12/7/20  Yes Amador Valverde MD   latanoprost (XALATAN) 0.005 % ophthalmic solution Administer 1 drop to both eyes every night at bedtime. 1/16/23  Yes Heri Rinaldi MD   multivitamin with minerals (MULTIVITAMIN ADULTS PO) Take 1 tablet by mouth Daily.   Yes Heri Rinaldi MD   testosterone (ANDROGEL) 25 MG/2.5GM (1%) gel gel Place 25 mg on the skin as directed by provider 2 (Two) Times a Week.   Yes Heri Rinaldi MD   traMADol (ULTRAM) 50 MG tablet Take 1 tablet by mouth At Night As Needed. 10/16/23  Yes Heri Rinaldi MD   Magnesium 400 MG capsule Take 400 mg by mouth As Needed.    Heri Rinaldi MD   sildenafil (REVATIO) 20 MG tablet Take 1 tablet by mouth As Needed.     Provider, MD Heri     Scheduled Meds:aspirin, 81 mg, Oral, Daily  brimonidine, 1 drop, Both Eyes, BID  bumetanide, 1 mg, Oral, Daily  cefepime, 2,000 mg, Intravenous, Q8H  DULoxetine, 30 mg, Oral, Daily  enoxaparin, 50 mg, Subcutaneous, Once  enoxaparin, 1 mg/kg, Subcutaneous, Q12H  hydrALAZINE, 25 mg, Oral, Q8H  insulin glargine, 20 Units, Subcutaneous, Daily  insulin lispro, 2-9 Units, Subcutaneous, 4x Daily AC & at Bedtime  latanoprost, 1 drop, Both Eyes, Nightly  metoprolol tartrate, 12.5 mg, Oral, Q12H  OLANZapine, 5 mg, Oral, Nightly  senna-docusate sodium, 2 tablet, Oral, BID  sodium chloride, 10 mL, Intravenous, Q12H  terazosin, 1 mg, Oral, Nightly  vancomycin, 1,500 mg, Intravenous, Q24H      Continuous Infusions:amiodarone, 1 mg/min   Followed by  amiodarone, 0.5 mg/min  Pharmacy to dose vancomycin,       PRN Meds:  acetaminophen **OR** acetaminophen **OR** acetaminophen    senna-docusate sodium **AND** polyethylene glycol **AND** bisacodyl **AND** bisacodyl    dextrose    dextrose    glucagon (human recombinant)    nitroglycerin    OLANZapine    ondansetron    Pharmacy to dose vancomycin    sodium chloride    sodium chloride    traMADol  Allergies:    Allergies   Allergen Reactions    Ace Inhibitors Angioedema    Angiotensin Receptor Blockers Angioedema and Unknown (See Comments)     Angioneurotic edema    Dapagliflozin Other (See Comments)     UTI,  rectal abcess    Losartan Angioedema     Angioneurotic edema    Seroquel [Quetiapine] Hallucinations    Xanax [Alprazolam] Unknown - High Severity    Amlodipine Swelling    Ativan [Lorazepam] Hallucinations    Baclofen Hallucinations    Misc. Sulfonamide Containing Compounds Unknown (See Comments)    Minoxidil Confusion    Tetracycline Rash     bliisters in mouth         Objective   Exam:     Vital Signs  Temp:  [97.5 °F (36.4 °C)-98.8 °F (37.1 °C)] 97.5 °F (36.4 °C)  Heart Rate:  [] 167  Resp:  [18] 18  BP: ()/() 143/103  SpO2:  [93  %-95 %] 93 %  on  Flow (L/min):  [4-4.5] 4.5;   Device (Oxygen Therapy): nasal cannula  Body mass index is 31.02 kg/m².  EXAM  General:  Chronically ill appearing male in no acute distress.    Head:      Normocephalic and atraumatic.    Eyes:      PERRL/EOM intact, conjunctivae and sclerae clear without nystagmus.    Neck:      No masses, thyromegaly,  trachea central   Lungs:    Clear bilaterally to auscultation.    Heart:      Regular rate and rhythm, no murmur no gallop  Abd:        Soft, nontender, not distended, bowel sounds positive, no shifting dullness.  Msk:        No deformity or scoliosis noted of thoracic or lumbar spine.    Pulses:   Pulses normal in all 4 extremities.    Extremities:        No cyanosis or clubbing- no edema.    Neuro:    No focal deficits.   alert oriented x3  Skin:       Intact without lesions or rashes.    Psych:    Alert and cooperative; normal mood and affect; normal attention span       Results Review:  I have personally reviewed most recent Data :  [facility]  CBC    Results from last 7 days   Lab Units 06/03/24  0608 06/02/24  0752 06/01/24  0551 05/31/24  0620 05/30/24  0433   WBC 10*3/mm3 7.71 7.94 8.26 14.38* 30.36*   HEMOGLOBIN g/dL 13.1 13.0 13.2 13.4 16.4   PLATELETS 10*3/mm3 195 204 168 184 230     CMP   Results from last 7 days   Lab Units 06/04/24  0741 06/03/24  0608 06/02/24  0752 06/01/24  0551 05/31/24  0620 05/30/24  0955 05/30/24  0433   SODIUM mmol/L 140 140 141 138 137 140 138   POTASSIUM mmol/L 4.1 3.9 4.2 4.5 4.4 4.0 5.5*   CHLORIDE mmol/L 106 105 107 106 102 107 98   CO2 mmol/L 25.0 23.1 24.0 23.4 27.0 24.6 29.2*   BUN mg/dL 17 17 23 31* 32* 22 24*   CREATININE mg/dL 1.29* 1.07 0.94 1.22 1.51* 0.90 1.17   GLUCOSE mg/dL 129* 105* 74 166* 97 228* 306*   ALBUMIN g/dL  --  3.2* 3.2*  --  3.3*  --  4.2   BILIRUBIN mg/dL  --   --   --   --  0.6  --  1.3*   ALK PHOS U/L  --   --   --   --  60  --  86   AST (SGOT) U/L  --   --   --   --  30  --  33    ALT (SGPT) U/L  --   --   --   --  18  --  31   AMMONIA umol/L  --   --   --   --   --   --  20     ABG    Results from last 7 days   Lab Units 05/30/24  0505   PH, ARTERIAL pH units 7.420   PCO2, ARTERIAL mm Hg 44.0   PO2 ART mm Hg 56.9*   O2 SATURATION ART % 89.5*   BASE EXCESS ART mmol/L 3.3*     CT Head Without Contrast    Result Date: 6/3/2024   No acute process identified.    Radiation dose reduction techniques were utilized, including automated exposure control and exposure modulation based on body size.   This report was finalized on 6/3/2024 9:52 AM by Dr. Tad Godfrey M.D on Workstation: [id number]      US Renal Bilateral    Result Date: 5/31/2024  Thickened urinary bladder wall. Low volume bladder. No hydronephrosis.  This report was finalized on 5/31/2024 6:25 PM by Dr. Zechariah Falcon M.D on Workstation: [id number]      CT Abdomen Pelvis With Contrast    Result Date: 5/30/2024   1. Diffuse urinary bladder wall thickening and mild perivesical fat stranding, which could be accentuated by under distention but could reflect a nonspecific cystitis but recommend correlating with urinalysis. 2. Low lung volumes with multifocal bilateral perihilar and bibasilar opacities that likely represent subsegmental atelectasis and/or scarring. 3. Likely benign 9 mm subpleural nodule in the base of the lingula and likely reactive mediastinal and bilateral hilar lymphadenopathy given the long-term stability. 4. Nonspecific fat stranding in the left inguinal region. Correlate for recent trauma or instrumentation. 5. Chronic appearing splenic vein thrombosis with peripheral calcification and multiple venous collaterals in the left upper quadrant. Additional pancreatic/peripancreatic calcifications could reflect sequela of chronic pancreatitis. 6. Mildly heterogeneous hepatic attenuation with subtle nodular liver contours, which could reflect underlying hepatocellular disease/cirrhosis. 7. Tiny hiatal hernia with  mild nonspecific thickening of the distal esophageal wall and mild gastric wall thickening that could be accentuated by under distention. Correlate for GERD and possible gastritis.  This report was finalized on 5/30/2024 7:35 AM by Julio Thomas MD on Workstation: [id number]      CT Chest With Contrast Diagnostic    Result Date: 5/30/2024   1. Diffuse urinary bladder wall thickening and mild perivesical fat stranding, which could be accentuated by under distention but could reflect a nonspecific cystitis but recommend correlating with urinalysis. 2. Low lung volumes with multifocal bilateral perihilar and bibasilar opacities that likely represent subsegmental atelectasis and/or scarring. 3. Likely benign 9 mm subpleural nodule in the base of the lingula and likely reactive mediastinal and bilateral hilar lymphadenopathy given the long-term stability. 4. Nonspecific fat stranding in the left inguinal region. Correlate for recent trauma or instrumentation. 5. Chronic appearing splenic vein thrombosis with peripheral calcification and multiple venous collaterals in the left upper quadrant. Additional pancreatic/peripancreatic calcifications could reflect sequela of chronic pancreatitis. 6. Mildly heterogeneous hepatic attenuation with subtle nodular liver contours, which could reflect underlying hepatocellular disease/cirrhosis. 7. Tiny hiatal hernia with mild nonspecific thickening of the distal esophageal wall and mild gastric wall thickening that could be accentuated by under distention. Correlate for GERD and possible gastritis.  This report was finalized on 5/30/2024 7:35 AM by Julio Thomas MD on Workstation: [id number]      XR Chest 1 View    Result Date: 5/30/2024  Nonspecific bibasilar consolidation.  This report was finalized on 5/30/2024 4:47 AM by Dr. Gayle Okeefe M.D on Workstation: [id number]       Results for orders placed during the hospital encounter of 05/30/24    Adult Transthoracic Echo  Complete W/ Cont if Necessary Per Protocol    Interpretation Summary    Left ventricular systolic function is hyperdynamic (EF > 70%). Calculated left ventricular EF = 75% Global longitudinal LV strain (GLS) = -17.8%. Left ventricle strain data was reviewed by the physician and found to be accurate. Global longitudinal LV strain is normal however there is regional abnormality with apical sparing suggestive of amyloid heart disease. However there is also basal ptal hypertrophy suggestive of hypertrophic cardiomyopathy. Wall motion abnormality is also noted in the basal septum and cannot rule out ischemic component.Consider additional imaging such as cardiac MRI and further evaluation also for amyloid heart disease as clinically indicated. The left ventricular cavity is small in size. Left ventricular wall thickness is consistent with moderate to severe septal asymmetric hypertrophy. There is hypokinesis of the left ventricular basal septum. Left ventricular diastolic function is consistent with (grade II w/high LAP) pseudonormalization.    The left atrial cavity is mildly dilated.    No aortic valve regurgitation or stenosis is present. The aortic valve is abnormal in structure. There is mild thickening of the aortic valve.    There is mild, bileaflet mitral valve thickening present. Trace mitral valve regurgitation is present. No significant mitral valve stenosis is present.    Moderate tricuspid valve regurgitation is present. Estimated right ventricular systolic pressure from tricuspid regurgitation is markedly elevated (>55 mmHg). Calculated right ventricular systolic pressure from tricuspid regurgitation is 74 mmHg.        Assessment & Plan   Assessment and Plan:         Acute UTI (urinary tract infection)    Type 2 diabetes mellitus with hyperglycemia, with long-term current use of insulin    CAD (coronary artery disease)    Hx of CABG    Chronic diastolic heart failure    CSA (central sleep apnea)    HTN  (hypertension)    History of stroke    CKD (chronic kidney disease) stage 3, GFR 30-59 ml/min    Peripheral arterial disease    Sepsis    Hyperkalemia    Metabolic encephalopathy    AMENA (acute kidney injury)    ASSESSMENT:  AMENA likely prerenal ATN 2/2 urosepsis with hemodynamic changes; on CKD III etiology likely diabetic and hypertensive nephropathy with baseline sCr ~0.9-1.2 mg/dL  Hyperkalemia, now resolved  UTI, urine cultures with gram neg bacilli  Leukocytosis  Metabolic encephalopathy  PAD  Hx stroke  HTN  CHF  CAD  DM2  Chronic splenic vein thrombosis  Possible hepatocellular disease/cirrhosis  Likely benign subpleural nodule with bilateral lymphadenopathy  Hiatal hernia    Last TTE 10/5/22 with EF 66%, grade II DD    PLAN :     AMENA likely prerenal ATN 2/2 urosepsis with hemodynamic changes; on CKD III etiology likely diabetic and hypertensive nephropathy with baseline sCr ~0.9-1.2 mg/dL  Renal function stable and back to baseline  On Bumex 1mg PO daily, continue  Volume with CXR with bibasilar consolidation,   Electrolytes, acid/base acceptable  Followed by infectious disease, evaluation noted, appreciate assistance.  BP trends acceptable  Strict I&O's  Avoid NSAIDs, nephrotoxic agents  We will follow and coordinate with team  Overall stable from renal standpoint  Discussed with family      Perla Mayen MD  [facility]  6/4/2024  12:28 EDT

## 2024-06-04 NOTE — PLAN OF CARE
Goal Outcome Evaluation:              Outcome Evaluation: pt aox3, vss, pt flipped into afib rvr 0400, IV lopressor administered, continue to monitor

## 2024-06-04 NOTE — PROGRESS NOTES
[facility] Care    [phone]   LOS: 5 days     Name: Dilip Verma  Age/Sex: 74 y.o. male  :  1950        PCP: Fernando Camargo DO  Chief Complaint   Patient presents with    Altered Mental Status      Subjective   He remains a little disoriented times and this AM is pretty confused.  Went in to AFib RVR this AM.  No chest pain or palp.  Wife present at the bedside.  General: No Fever or Chills, Cardiac: No Chest Pain or Palpitations, Resp: No Cough or SOA, GI: No Nausea, Vomiting, or Diarrhea, and Other: No bleeding    aspirin, 81 mg, Oral, Daily  brimonidine, 1 drop, Both Eyes, BID  bumetanide, 1 mg, Oral, Daily  cefepime, 2,000 mg, Intravenous, Q8H  DULoxetine, 30 mg, Oral, Daily  enoxaparin, 50 mg, Subcutaneous, Once  enoxaparin, 1 mg/kg, Subcutaneous, Q12H  hydrALAZINE, 25 mg, Oral, Q8H  insulin glargine, 20 Units, Subcutaneous, Daily  insulin lispro, 2-9 Units, Subcutaneous, 4x Daily AC & at Bedtime  latanoprost, 1 drop, Both Eyes, Nightly  metoprolol tartrate, 12.5 mg, Oral, Q12H  OLANZapine, 5 mg, Oral, Nightly  senna-docusate sodium, 2 tablet, Oral, BID  sodium chloride, 10 mL, Intravenous, Q12H  terazosin, 1 mg, Oral, Nightly  vancomycin, 1,500 mg, Intravenous, Q24H      amiodarone, 1 mg/min   Followed by  amiodarone, 0.5 mg/min  Pharmacy to dose vancomycin,         Objective   Vital Signs  Temp:  [97.5 °F (36.4 °C)-98.8 °F (37.1 °C)] 97.5 °F (36.4 °C)  Heart Rate:  [] 167  Resp:  [18] 18  BP: ()/() 143/103  Body mass index is 31.02 kg/m².    Intake/Output Summary (Last 24 hours) at 2024 1227  Last data filed at 2024 0501  Gross per 24 hour   Intake 610 ml   Output 750 ml   Net -140 ml       Physical Exam  Constitutional:       General: He is not in acute distress.     Appearance: He is obese. He is ill-appearing.   Cardiovascular:      Rate and Rhythm: Tachycardia present. Rhythm irregular.   Pulmonary:      Effort: No respiratory distress.      Breath  sounds: Normal breath sounds.   Abdominal:      General: Bowel sounds are normal.      Palpations: Abdomen is soft.   Musculoskeletal:      Left lower leg: Edema present.      Comments: Still with doughy edema in his left lower extremity with significant erythema.  Compressive Ace wrap to mid calf.   Skin:     Findings: Erythema present.   Neurological:      Mental Status: Mental status is at baseline.           Results Review:       I reviewed the patient's new clinical results.  Results from last 7 days   Lab Units 06/03/24  0608 06/02/24  0752 06/01/24  0551 05/31/24  0620 05/30/24  0433   WBC 10*3/mm3 7.71 7.94 8.26 14.38* 30.36*   HEMOGLOBIN g/dL 13.1 13.0 13.2 13.4 16.4   PLATELETS 10*3/mm3 195 204 168 184 230     Results from last 7 days   Lab Units 06/04/24  0741 06/03/24  0608 06/02/24  0752 06/01/24  0551 05/31/24  0620 05/30/24  0955 05/30/24  0433   SODIUM mmol/L 140 140 141 138 137 140 138   POTASSIUM mmol/L 4.1 3.9 4.2 4.5 4.4 4.0 5.5*   CHLORIDE mmol/L 106 105 107 106 102 107 98   CO2 mmol/L 25.0 23.1 24.0 23.4 27.0 24.6 29.2*   BUN mg/dL 17 17 23 31* 32* 22 24*   CREATININE mg/dL 1.29* 1.07 0.94 1.22 1.51* 0.90 1.17   CALCIUM mg/dL 8.3* 8.6 8.5* 8.3* 8.8 7.3* 9.6   MAGNESIUM mg/dL 2.0 2.0 2.1  --   --   --  1.9   PHOSPHORUS mg/dL  --  3.1 2.4*  --   --   --   --    Estimated Creatinine Clearance: 54.6 mL/min (A) (by C-G formula based on SCr of 1.29 mg/dL (H)).      Assessment & Plan   Active Hospital Problems    Diagnosis  POA    **Acute UTI (urinary tract infection) [N39.0]  Yes    AMENA (acute kidney injury) [N17.9]  Unknown    Sepsis [A41.9]  Yes    Hyperkalemia [E87.5]  Unknown    Metabolic encephalopathy [G93.41]  Unknown    Peripheral arterial disease [I73.9]  Yes    History of stroke [Z86.73]  Not Applicable    CKD (chronic kidney disease) stage 3, GFR 30-59 ml/min [N18.30]  Yes    HTN (hypertension) [I10]  Yes    CSA (central sleep apnea) [G47.31]  Yes    Chronic diastolic heart failure  [I50.32]  Yes    Hx of CABG [Z95.1]  Not Applicable    CAD (coronary artery disease) [I25.10]  Yes    Type 2 diabetes mellitus with hyperglycemia, with long-term current use of insulin [E11.65, Z79.4]  Not Applicable      Resolved Hospital Problems   No resolved problems to display.       PLAN  This is a 74-year-old gentleman with a history of chronic kidney disease hypertension coronary artery disease type 2 diabetes peripheral artery disease who presents to the hospital with metabolic encephalopathy and is found to have sepsis and acute renal failure secondary to underlying urinary tract infection  -Urine cultures growing gram-negative rods although it is not a great colony count at  50,000 CFU.  Ultimately cultures have speciated Serratia   -This left lower extremity is looking more more like cellulitis likely originating from this abrasion on his tibial surface.  It is possible that he might have a Serratia skin infection resulting.  Transient bacteremia and transient bacteriuria.   -Continue IV antibiotics.  PSA was within normal limits no evidence of prostatitis.  He does have some urinary retention which is also contributing to his acute renal failure.  -DW ID and continue vanc and cefepime today  -TSH WNL and no response to lopressor and dig this AM cardiology consulted and nick for amiodarone noted  -Continue to cath as needed.  Encourage out of bed activity as this will help with urinary retention.  Hytrin started.  -Baseline creatinine around 1.1 up to 1.5 trending down.  Nephrology following  -Noted metabolic encephalopathy appears to be resolving, now with ongoing delirium  -Pharmacologic DVT prophylaxis  -Full code      Disposition  Expected Discharge Date: 6/5/2024; Expected Discharge Time:        Que Gregg MD  [facility]  06/04/24  12:27 EDT

## 2024-06-04 NOTE — NURSING NOTE
WOCN consult LLE. Foot is edematous 4 plus edema. Leg with erythema. Currently on antibiotics. Appears cellulitis. Left leg cleanse with foam cleanser, lotion applied , roll gauze and 2 4 inch ace wraps toes to knee.  Remove and reapply 3 times per week. Keep leg elevated. Patient is able to raise left leg. He seems somewhat confused at this time.

## 2024-06-04 NOTE — PROGRESS NOTES
[facility] Clinical Pharmacy Services: Vancomycin Level Monitoring Note    Dilip Verma is a 74 y.o. male who is on day 3 of pharmacy to dose vancomycin for Skin and Soft Tissue.    Estimated Creatinine Clearance: 54.6 mL/min (A) (by C-G formula based on SCr of 1.29 mg/dL (H)).    Current Vanc Dose: 1500 mg IV every 24 hours    Results from last 7 days   Lab Units 06/04/24  1006   VANCOMYCIN TR mcg/mL 11.00     Predicted AUC at current dose:485 mg/L.    Will not schedule a level at this time. Will continue to monitor renal function and anticipated duration of therapy and adjust plan accordingly.     No changes at this time. Pharmacy is continuing to monitor and will adjust as needed.    [facility]  Clinical Pharmacist

## 2024-06-05 LAB
ANION GAP SERPL CALCULATED.3IONS-SCNC: 11.9 MMOL/L (ref 5–15)
BUN SERPL-MCNC: 24 MG/DL (ref 8–23)
BUN/CREAT SERPL: 16.6 (ref 7–25)
CALCIUM SPEC-SCNC: 8.3 MG/DL (ref 8.6–10.5)
CHLORIDE SERPL-SCNC: 102 MMOL/L (ref 98–107)
CO2 SERPL-SCNC: 22.1 MMOL/L (ref 22–29)
CREAT SERPL-MCNC: 1.45 MG/DL (ref 0.76–1.27)
EGFRCR SERPLBLD CKD-EPI 2021: 50.3 ML/MIN/1.73
GLUCOSE BLDC GLUCOMTR-MCNC: 105 MG/DL (ref 70–130)
GLUCOSE BLDC GLUCOMTR-MCNC: 160 MG/DL (ref 70–130)
GLUCOSE BLDC GLUCOMTR-MCNC: 94 MG/DL (ref 70–130)
GLUCOSE BLDC GLUCOMTR-MCNC: 96 MG/DL (ref 70–130)
GLUCOSE SERPL-MCNC: 108 MG/DL (ref 65–99)
POTASSIUM SERPL-SCNC: 3.9 MMOL/L (ref 3.5–5.2)
QT INTERVAL: 413 MS
QTC INTERVAL: 399 MS
SODIUM SERPL-SCNC: 136 MMOL/L (ref 136–145)

## 2024-06-05 PROCEDURE — 97110 THERAPEUTIC EXERCISES: CPT

## 2024-06-05 PROCEDURE — 25010000002 CEFEPIME PER 500 MG: Performed by: INTERNAL MEDICINE

## 2024-06-05 PROCEDURE — 25010000002 AMIODARONE IN DEXTROSE 5% 360-4.14 MG/200ML-% SOLUTION: Performed by: NURSE PRACTITIONER

## 2024-06-05 PROCEDURE — 63710000001 INSULIN LISPRO (HUMAN) PER 5 UNITS: Performed by: INTERNAL MEDICINE

## 2024-06-05 PROCEDURE — 82948 REAGENT STRIP/BLOOD GLUCOSE: CPT

## 2024-06-05 PROCEDURE — 93010 ELECTROCARDIOGRAM REPORT: CPT | Performed by: INTERNAL MEDICINE

## 2024-06-05 PROCEDURE — 99232 SBSQ HOSP IP/OBS MODERATE 35: CPT | Performed by: NURSE PRACTITIONER

## 2024-06-05 PROCEDURE — 93005 ELECTROCARDIOGRAM TRACING: CPT | Performed by: HOSPITALIST

## 2024-06-05 PROCEDURE — 80048 BASIC METABOLIC PNL TOTAL CA: CPT

## 2024-06-05 PROCEDURE — 97116 GAIT TRAINING THERAPY: CPT

## 2024-06-05 PROCEDURE — 25010000002 ENOXAPARIN PER 10 MG: Performed by: NURSE PRACTITIONER

## 2024-06-05 PROCEDURE — 63710000001 INSULIN GLARGINE PER 5 UNITS: Performed by: HOSPITALIST

## 2024-06-05 RX ORDER — AMIODARONE HYDROCHLORIDE 200 MG/1
200 TABLET ORAL
Status: DISCONTINUED | OUTPATIENT
Start: 2024-06-05 | End: 2024-06-25 | Stop reason: HOSPADM

## 2024-06-05 RX ADMIN — HYDRALAZINE HYDROCHLORIDE 25 MG: 25 TABLET ORAL at 05:36

## 2024-06-05 RX ADMIN — INSULIN GLARGINE 20 UNITS: 100 INJECTION, SOLUTION SUBCUTANEOUS at 08:23

## 2024-06-05 RX ADMIN — CEFEPIME 2000 MG: 2 INJECTION, POWDER, FOR SOLUTION INTRAVENOUS at 22:16

## 2024-06-05 RX ADMIN — Medication 10 ML: at 08:24

## 2024-06-05 RX ADMIN — ASPIRIN 81 MG: 81 TABLET, COATED ORAL at 21:57

## 2024-06-05 RX ADMIN — HYDRALAZINE HYDROCHLORIDE 25 MG: 25 TABLET ORAL at 13:47

## 2024-06-05 RX ADMIN — METOPROLOL TARTRATE 12.5 MG: 25 TABLET, FILM COATED ORAL at 22:05

## 2024-06-05 RX ADMIN — BRIMONIDINE TARTRATE 1 DROP: 2 SOLUTION OPHTHALMIC at 08:26

## 2024-06-05 RX ADMIN — TERAZOSIN HYDROCHLORIDE 1 MG: 1 CAPSULE ORAL at 21:40

## 2024-06-05 RX ADMIN — APIXABAN 5 MG: 5 TABLET, FILM COATED ORAL at 21:54

## 2024-06-05 RX ADMIN — METOPROLOL TARTRATE 12.5 MG: 25 TABLET, FILM COATED ORAL at 08:23

## 2024-06-05 RX ADMIN — CEFEPIME 2000 MG: 2 INJECTION, POWDER, FOR SOLUTION INTRAVENOUS at 10:55

## 2024-06-05 RX ADMIN — LATANOPROST 1 DROP: 50 SOLUTION OPHTHALMIC at 22:01

## 2024-06-05 RX ADMIN — INSULIN LISPRO 2 UNITS: 100 INJECTION, SOLUTION INTRAVENOUS; SUBCUTANEOUS at 10:55

## 2024-06-05 RX ADMIN — AMIODARONE HYDROCHLORIDE 200 MG: 200 TABLET ORAL at 15:18

## 2024-06-05 RX ADMIN — HYDRALAZINE HYDROCHLORIDE 25 MG: 25 TABLET ORAL at 22:06

## 2024-06-05 RX ADMIN — Medication 10 ML: at 22:05

## 2024-06-05 RX ADMIN — TRAMADOL HYDROCHLORIDE 50 MG: 50 TABLET ORAL at 22:26

## 2024-06-05 RX ADMIN — DULOXETINE HYDROCHLORIDE 30 MG: 30 CAPSULE, DELAYED RELEASE ORAL at 22:03

## 2024-06-05 RX ADMIN — BRIMONIDINE TARTRATE 1 DROP: 2 SOLUTION OPHTHALMIC at 21:59

## 2024-06-05 RX ADMIN — ENOXAPARIN SODIUM 90 MG: 100 INJECTION SUBCUTANEOUS at 08:23

## 2024-06-05 RX ADMIN — AMIODARONE HYDROCHLORIDE 0.5 MG/MIN: 1.8 INJECTION, SOLUTION INTRAVENOUS at 05:33

## 2024-06-05 RX ADMIN — OLANZAPINE 5 MG: 5 TABLET, FILM COATED ORAL at 22:04

## 2024-06-05 RX ADMIN — BUMETANIDE 1 MG: 1 TABLET ORAL at 08:23

## 2024-06-05 NOTE — SIGNIFICANT NOTE
06/05/24 1503   OTHER   Discipline occupational therapist   Rehab Time/Intention   Session Not Performed other (see comments)  (attempted to see pt in PM, pt just worked with PT, finishing session on arrival. f/u tomorrow for OT)   Recommendation   OT - Next Appointment 06/06/24

## 2024-06-05 NOTE — PROGRESS NOTES
[facility] to Hospital Care    [phone]   LOS: 6 days     Name: Dilip Verma  Age/Sex: 74 y.o. male  :  1950        PCP: Fernando Camargo DO  Chief Complaint   Patient presents with    Altered Mental Status      Subjective   He remains a little disoriented times and this AM is more appropriate.  Converted back to sinus rhythm wife at bedside  General: No Fever or Chills, Cardiac: No Chest Pain or Palpitations, Resp: No Cough or SOA, GI: No Nausea, Vomiting, or Diarrhea, and Other: No bleeding    aspirin, 81 mg, Oral, Daily  brimonidine, 1 drop, Both Eyes, BID  bumetanide, 1 mg, Oral, Daily  cefepime, 2,000 mg, Intravenous, Q12H  DULoxetine, 30 mg, Oral, Daily  enoxaparin, 50 mg, Subcutaneous, Once  enoxaparin, 1 mg/kg, Subcutaneous, Q12H  hydrALAZINE, 25 mg, Oral, Q8H  insulin glargine, 20 Units, Subcutaneous, Daily  insulin lispro, 2-9 Units, Subcutaneous, 4x Daily AC & at Bedtime  latanoprost, 1 drop, Both Eyes, Nightly  metoprolol tartrate, 12.5 mg, Oral, Q12H  OLANZapine, 5 mg, Oral, Nightly  senna-docusate sodium, 2 tablet, Oral, BID  sodium chloride, 10 mL, Intravenous, Q12H  terazosin, 1 mg, Oral, Nightly      amiodarone, 0.5 mg/min, Last Rate: 0.5 mg/min (24 0533)        Objective   Vital Signs  Temp:  [98.2 °F (36.8 °C)-98.4 °F (36.9 °C)] 98.2 °F (36.8 °C)  Heart Rate:  [55-74] 71  Resp:  [17-23] 23  BP: ()/() 137/89  Body mass index is 31.02 kg/m².    Intake/Output Summary (Last 24 hours) at 2024 1102  Last data filed at 2024 0500  Gross per 24 hour   Intake 640 ml   Output 400 ml   Net 240 ml       Physical Exam  Constitutional:       General: He is not in acute distress.     Appearance: He is obese. He is ill-appearing.   Cardiovascular:      Rate and Rhythm: Tachycardia present. Rhythm irregular.   Pulmonary:      Effort: No respiratory distress.      Breath sounds: Normal breath sounds.   Abdominal:      General: Bowel sounds are normal.      Palpations: Abdomen  is soft.   Musculoskeletal:      Left lower leg: Edema present.      Comments: Still with doughy edema in his left lower extremity with significant erythema.  Compressive Ace wrap to mid calf.   Skin:     Findings: Erythema present.   Neurological:      Mental Status: Mental status is at baseline.           Results Review:       I reviewed the patient's new clinical results.  Results from last 7 days   Lab Units 06/03/24  0608 06/02/24  0752 06/01/24  0551 05/31/24  0620 05/30/24  0433   WBC 10*3/mm3 7.71 7.94 8.26 14.38* 30.36*   HEMOGLOBIN g/dL 13.1 13.0 13.2 13.4 16.4   PLATELETS 10*3/mm3 195 204 168 184 230     Results from last 7 days   Lab Units 06/05/24  0654 06/04/24  0741 06/03/24  0608 06/02/24  0752 06/01/24  0551 05/31/24  0620 05/30/24  0955 05/30/24  0433   SODIUM mmol/L 136 140 140 141 138 137 140 138   POTASSIUM mmol/L 3.9 4.1 3.9 4.2 4.5 4.4 4.0 5.5*   CHLORIDE mmol/L 102 106 105 107 106 102 107 98   CO2 mmol/L 22.1 25.0 23.1 24.0 23.4 27.0 24.6 29.2*   BUN mg/dL 24* 17 17 23 31* 32* 22 24*   CREATININE mg/dL 1.45* 1.29* 1.07 0.94 1.22 1.51* 0.90 1.17   CALCIUM mg/dL 8.3* 8.3* 8.6 8.5* 8.3* 8.8 7.3* 9.6   MAGNESIUM mg/dL  --  2.0 2.0 2.1  --   --   --  1.9   PHOSPHORUS mg/dL  --   --  3.1 2.4*  --   --   --   --    Estimated Creatinine Clearance: 48.6 mL/min (A) (by C-G formula based on SCr of 1.45 mg/dL (H)).      Assessment & Plan   Active Hospital Problems    Diagnosis  POA    **Acute UTI (urinary tract infection) [N39.0]  Yes    AMENA (acute kidney injury) [N17.9]  Unknown    Sepsis [A41.9]  Yes    Hyperkalemia [E87.5]  Unknown    Metabolic encephalopathy [G93.41]  Unknown    Peripheral arterial disease [I73.9]  Yes    History of stroke [Z86.73]  Not Applicable    CKD (chronic kidney disease) stage 3, GFR 30-59 ml/min [N18.30]  Yes    HTN (hypertension) [I10]  Yes    CSA (central sleep apnea) [G47.31]  Yes    Chronic diastolic heart failure [I50.32]  Yes    Hx of CABG [Z95.1]  Not Applicable     CAD (coronary artery disease) [I25.10]  Yes    Type 2 diabetes mellitus with hyperglycemia, with long-term current use of insulin [E11.65, Z79.4]  Not Applicable      Resolved Hospital Problems   No resolved problems to display.       PLAN  This is a 74-year-old gentleman with a history of chronic kidney disease hypertension coronary artery disease type 2 diabetes peripheral artery disease who presents to the hospital with metabolic encephalopathy and is found to have sepsis and acute renal failure secondary to underlying urinary tract infection  -Urine cultures growing gram-negative rods although it is not a great colony count at  50,000 CFU.  Ultimately cultures have speciated Serratia   -This left lower extremity is looking more more like cellulitis likely originating from this abrasion on his tibial surface.  It is possible that he might have a Serratia skin infection resulting.  Transient bacteremia and transient bacteriuria.   -Continue IV antibiotics.  PSA was within normal limits no evidence of prostatitis.  He does have some urinary retention which is also contributing to his acute renal failure.  -Loaded with digoxin yesterday and then loaded with amiodarone.  Awaiting cardiology recommendations for ongoing rate or rhythm control issues.  Initiated on Lovenox 1 mg/kg yesterday can transition to Eliquis at discharge.  -Recommendations reviewed from infectious disease plan to transition to oral antibiotics at discharge per their recommendations.  Vancomycin has been discontinued  -TSH WNL and no response to lopressor and dig this AM cardiology consulted and nick for amiodarone noted  -Continue to cath as needed.  Encourage out of bed activity as this will help with urinary retention.  Hytrin started.  -Baseline creatinine around 1.1 up to 1.5 trending down.  Nephrology following  -Noted metabolic encephalopathy appears to be resolving, now with ongoing delirium  -Pharmacologic DVT prophylaxis  -Full  code      Disposition  Expected Discharge Date: 6/6/2024; Expected Discharge Time:    Planning discharge tomorrow home with home health.  Discussed limitations and recommendations from PT but family is adamant they will be capable of managing everything he needs at home.  Discussed with CCP today    Que Gregg MD  [facility]  06/05/24  11:02 EDT

## 2024-06-05 NOTE — CASE MANAGEMENT/SOCIAL WORK
Continued Stay Note  [facility]     Patient Name: Dilip Verma  MRN: [id number]  Today's Date: 6/5/2024    Admit Date: 5/30/2024    Plan: Home with  pending acceptance.   Discharge Plan       Row Name 06/05/24 1022       Plan    Plan Home with  pending acceptance.    Patient/Family in Agreement with Plan yes    Plan Comments Discussed weakness with pt and wife, they are insistent on returning home and are agreeable to , referral sent for [facility], planning d/c tomorrow pending tolerance to PO amiodarone. CCP will follow - Nabila GONZALEZ                   Discharge Codes    No documentation.                 Expected Discharge Date and Time       Expected Discharge Date Expected Discharge Time    Jun 6, 2024               Nabila Ramsey RN

## 2024-06-05 NOTE — NURSING NOTE
"RN went in to administer 0600 meds to find IV pump turned off & Amio gtt off. Pt wife at bedside & admitted to turning pump off without notifying staff. Unknown how long Amio gtt has been off.   Educated wife on CRITICAL importance of gtt & that she is not authorized to touch any medical equipment. Wife disregarded education, de-flected and responds \"I think he keeps removing his O2 because his throat is swelling, maybe he has angio edema and could use a drink of Sprite\".   RN re-assured wife that pt does not have angio edema, and SpO2 remains perfect as long as O2 remains on, and choosing to drink liquid for difficulty breathing is not advised.   Amio gtt restarted. Educated wife once again not to touch IV pump or medical equipment. Pt remains in NSR.   "

## 2024-06-05 NOTE — NURSING NOTE
"Wife remains at bedside through night but very disruptive to care.     Pt continuously pulling off equipment & O2 flailing around in the bed. Unable to re-direct pt to situation. Wife hovering over bedside watching and participating in pt delusions & hallucinations vs assisting to keep pt safe and compliant w/ POC.     Wife requesting pt receive tramadol for \"restlessness\". RN attempted to educate that is mis-use of medication, pt denying pain. Wife still insisting that \"he takes it for restlessness and agitation\". Informed wife that another medication was available for restlessness, but wife argumentative & demanding Tramadol administration instead.   RN used best judgement to not administer.     Wife refused insulin administration based on hospital glucometer reading d/t pt dex com reading.   "

## 2024-06-05 NOTE — PROGRESS NOTES
"  Infectious Diseases Progress Note    Tino Nagel MD     [facility]  [place]: 6 days  Patient Identification:  Name: Dilip Verma  Age: 74 y.o.  Sex: male  :  1950  MRN: [id number]         Primary Care Physician: Fernando Camargo, DO        Subjective: Feeling better with significant improvement in burning in urination but still have some.  According to the wife had some episode of confusion last night which is improved.  Able to eat all of his breakfast this morning.  Decreased pain and discomfort in his left leg.  Interval History: See consultation note.  2024 MRSA screen is negative.  Objective:    Scheduled Meds:aspirin, 81 mg, Oral, Daily  brimonidine, 1 drop, Both Eyes, BID  bumetanide, 1 mg, Oral, Daily  cefepime, 2,000 mg, Intravenous, Q12H  DULoxetine, 30 mg, Oral, Daily  enoxaparin, 50 mg, Subcutaneous, Once  enoxaparin, 1 mg/kg, Subcutaneous, Q12H  hydrALAZINE, 25 mg, Oral, Q8H  insulin glargine, 20 Units, Subcutaneous, Daily  insulin lispro, 2-9 Units, Subcutaneous, 4x Daily AC & at Bedtime  latanoprost, 1 drop, Both Eyes, Nightly  metoprolol tartrate, 12.5 mg, Oral, Q12H  OLANZapine, 5 mg, Oral, Nightly  senna-docusate sodium, 2 tablet, Oral, BID  sodium chloride, 10 mL, Intravenous, Q12H  terazosin, 1 mg, Oral, Nightly  vancomycin, 1,500 mg, Intravenous, Q24H      Continuous Infusions:amiodarone, 0.5 mg/min, Last Rate: 0.5 mg/min (24 0533)  Pharmacy to dose vancomycin,         Vital signs in last 24 hours:  Temp:  [97.5 °F (36.4 °C)-98.4 °F (36.9 °C)] 98.3 °F (36.8 °C)  Heart Rate:  [] 62  Resp:  [17-19] 19  BP: ()/() 115/76    Intake/Output:    Intake/Output Summary (Last 24 hours) at 2024 0623  Last data filed at 2024 0500  Gross per 24 hour   Intake 880 ml   Output 400 ml   Net 480 ml       Exam:  /76 (BP Location: Right arm, Patient Position: Lying)   Pulse 62   Temp 98.3 °F (36.8 °C) (Oral)   Resp 19   Ht 172.7 cm (68\")   Wt 92.5 " kg (204 lb)   SpO2 91%   BMI 31.02 kg/m²   Patient is examined using the personal protective equipment as per guidelines from infection control for this particular patient as enacted.  Hand washing was performed before and after patient interaction.  General Appearance:  Wake interactive appropriate and appears to have a better color.                          Head:    Normocephalic, without obvious abnormality, atraumatic                           Eyes:    PERRL, conjunctivae/corneas clear, EOM's intact, both eyes                         Throat:   Lips, tongue, gums normal; oral mucosa pink and moist                           Neck:   Supple, symmetrical, trachea midline, no JVD                         Lungs:    Clear to auscultation bilaterally, respirations unlabored                 Chest Wall:    No tenderness or deformity                          Heart:  S1-S2 regular                  Abdomen:   Soft nontender                 Extremities: left lower extremity is wrapped with residual edema of the left foot                        Pulses:   Pulses palpable in all extremities                            Skin:   Skin is warm and dry,  no rashes or palpable lesions                  Neurologic: More appropriate not as confused interactive and appears to be in no acute distress       Data Review:    I reviewed the patient's new clinical results.  Results from last 7 days   Lab Units 06/03/24  0608 06/02/24  0752 06/01/24  0551 05/31/24  0620 05/30/24  0433   WBC 10*3/mm3 7.71 7.94 8.26 14.38* 30.36*   HEMOGLOBIN g/dL 13.1 13.0 13.2 13.4 16.4   PLATELETS 10*3/mm3 195 204 168 184 230     Results from last 7 days   Lab Units 06/04/24  0741 06/03/24  0608 06/02/24  0752 06/01/24  0551 05/31/24  0620 05/30/24  0955 05/30/24  0433   SODIUM mmol/L 140 140 141 138 137 140 138   POTASSIUM mmol/L 4.1 3.9 4.2 4.5 4.4 4.0 5.5*   CHLORIDE mmol/L 106 105 107 106 102 107 98   CO2 mmol/L 25.0 23.1 24.0 23.4 27.0 24.6 29.2*   BUN mg/dL  17 17 23 31* 32* 22 24*   CREATININE mg/dL 1.29* 1.07 0.94 1.22 1.51* 0.90 1.17   CALCIUM mg/dL 8.3* 8.6 8.5* 8.3* 8.8 7.3* 9.6   GLUCOSE mg/dL 129* 105* 74 166* 97 228* 306*     Microbiology Results (last 10 days)       Procedure Component Value - Date/Time    MRSA Screen, PCR (Inpatient) - Swab, Nares [[id number]]  (Normal) Collected: 06/04/24 0952    Lab Status: Final result Specimen: Swab from Nares Updated: 06/04/24 1112     MRSA PCR No MRSA Detected    Narrative:      The negative predictive value of this diagnostic test is high and should only be used to consider de-escalating anti-MRSA therapy. A positive result may indicate colonization with MRSA and must be correlated clinically.    Eosinophil Smear - Urine, Urine, Clean Catch [[id number]]  (Normal) Collected: 05/31/24 1614    Lab Status: Final result Specimen: Urine, Clean Catch Updated: 05/31/24 1745     Eosinophil Smear 0 % EOS/100 Cells     Urine Culture - Urine, Urine, Clean Catch [[id number]]  (Abnormal)  (Susceptibility) Collected: 05/30/24 0538    Lab Status: Final result Specimen: Urine, Clean Catch Updated: 06/02/24 0935     Urine Culture 50,000 CFU/mL Serratia marcescens    Narrative:      Colonization of the urinary tract without infection is common. Treatment is discouraged unless the patient is symptomatic, pregnant, or undergoing an invasive urologic procedure.    Susceptibility        Serratia marcescens      KAT Not Specified      Amoxicillin + Clavulanate Resistant       Cefazolin Resistant       Cefepime Susceptible       Ceftazidime Susceptible       Ceftriaxone Susceptible       Gentamicin Susceptible       Levofloxacin Susceptible       Nitrofurantoin Resistant       Piperacillin + Tazobactam  Susceptible      Trimethoprim + Sulfamethoxazole Susceptible                          Susceptibility Comments       Serratia marcescens    Pip/patsy confirmed by disk diffusion.               Blood Culture - Blood, Arm, Left [[id number]]  (Normal)  Collected: 05/30/24 0441    Lab Status: Final result Specimen: Blood from Arm, Left Updated: 06/04/24 0500     Blood Culture No growth at 5 days    Narrative:      Less than seven (7) mL's of blood was collected.  Insufficient quantity may yield false negative results.    Blood Culture - Blood, Arm, Left [[id number]]  (Normal) Collected: 05/30/24 0433    Lab Status: Final result Specimen: Blood from Arm, Left Updated: 06/04/24 0445     Blood Culture No growth at 5 days            Assessment:    Acute UTI (urinary tract infection)    Type 2 diabetes mellitus with hyperglycemia, with long-term current use of insulin    CAD (coronary artery disease)    Hx of CABG    Chronic diastolic heart failure    CSA (central sleep apnea)    HTN (hypertension)    History of stroke    CKD (chronic kidney disease) stage 3, GFR 30-59 ml/min    Peripheral arterial disease    Sepsis    Hyperkalemia    Metabolic encephalopathy    AMENA (acute kidney injury)  1-metabolic encephalopathy due to  2-systemic infection as a result of urinary tract infection as well as evolving sepsis traumatic cellulitis of the left leg.  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     Recommendations/Discussions:  He is doing very well in terms of his symptoms such as dysuria and discomfort in his left leg.  His mental status is also significantly improved.  Since his MRSA screen is negative would recommend DC vancomycin.  Continue with IV cefepime while he is here to address both traumatic cellulitis as well as Serratia marcescens UTI and when considered stable and improved can be switched to combination of ciprofloxacin and Augmentin to complete the 10-day course of  treatment.  Side effects of antibiotic therapy that could occur discussed with the patient's wife at the bedside.  Patient would need continued elevation and wrapping of left lower extremity until edema is resolved and may benefit from out of hospital wound care follow-ups if concern for secondary infection of the wound still persists.  Tino Nagel MD  6/5/2024  06:23 EDT    Parts of this note may be an electronic transcription/translation of spoken language to printed text using the Dragon dictation system.

## 2024-06-05 NOTE — PROGRESS NOTES
Hospital Follow Up    LOS:  LOS: 6 days   Patient Name: Dilip Verma  Age/Sex: 74 y.o. male  : 1950  MRN: [id number]    Day of Service: 24   Length of Stay: 6  Encounter Provider: LEIDY Hall  Place of Service: [facility] CARDIOLOGY  Patient Care Team:  Fernando Camargo DO as PCP - General (Family Medicine)  Morris Cai MD as Consulting Physician (Sleep Medicine)  Michaela Hylton MD as Consulting Physician (Cardiology)  Hardy Banerjee MD as Consulting Physician (Ophthalmology)  Chula Christianson MD as Consulting Physician (Ophthalmology)  Jason Sherman MD (Endocrinology)  Perla Mayen MD as Consulting Physician (Nephrology)  Federico Hebert MD as Consulting Physician (Pulmonary Disease)  Niraj Ravi MD as Consulting Physician (Orthopedic Surgery)  Papa Velasco MD as Consulting Physician (Urology)  Terese Yost MD as Consulting Physician (Gastroenterology)  Aracelis Ball MD as Consulting Physician (Colon and Rectal Surgery)  [facility] as Primary Care Provider    Subjective:     Chief Complaint: Afib    Interval History: confused pleasatly    Objective:     Objective:  Temp:  [98.2 °F (36.8 °C)-98.4 °F (36.9 °C)] 98.4 °F (36.9 °C)  Heart Rate:  [55-71] 57  Resp:  [16-23] 16  BP: ()/() 116/71     Intake/Output Summary (Last 24 hours) at 2024 1350  Last data filed at 2024 0500  Gross per 24 hour   Intake 520 ml   Output 400 ml   Net 120 ml     Body mass index is 31.02 kg/m².      24  0350 24  1103 24  0819   Weight: 86.6 kg (191 lb) 92.9 kg (204 lb 12.9 oz) 92.5 kg (204 lb)     Weight change:     Physical Exam:   General Appearance:    Pleasantly confused.   Color:  Skin:  Neuro:  HEENT:    Lungs:     Pink  Warm and dry  No focal, motor or sensory deficits  Neck supple, pupils equal, round and reactive. No JVD, No Bruit  Clear to auscultation,respirations  "regular, even and                  unlabored    Heart:    Regular rate and rhythm, S1 and S2, no murmur, no gallop, no rub. No edema, DP/PT pulses are 2+   Chest Wall:    No abnormalities observed   Abdomen:     Normal bowel sounds, no masses, no organomegaly, soft        non-tender, non-distended, no guarding, no ascites noted   Extremities:   Moves all extremities well, no edema, no cyanosis, no redness       Lab Review:   Results from last 7 days   Lab Units 06/05/24  0654 06/04/24  0741 06/01/24  0551 05/31/24  0620 05/30/24  0955 05/30/24  0433   SODIUM mmol/L 136 140   < > 137   < > 138   POTASSIUM mmol/L 3.9 4.1   < > 4.4   < > 5.5*   CHLORIDE mmol/L 102 106   < > 102   < > 98   CO2 mmol/L 22.1 25.0   < > 27.0   < > 29.2*   BUN mg/dL 24* 17   < > 32*   < > 24*   CREATININE mg/dL 1.45* 1.29*   < > 1.51*   < > 1.17   GLUCOSE mg/dL 108* 129*   < > 97   < > 306*   CALCIUM mg/dL 8.3* 8.3*   < > 8.8   < > 9.6   AST (SGOT) U/L  --   --   --  30  --  33   ALT (SGPT) U/L  --   --   --  18  --  31    < > = values in this interval not displayed.     Results from last 7 days   Lab Units 05/30/24  0635 05/30/24  0433   HSTROP T ng/L 38* 40*     Results from last 7 days   Lab Units 06/03/24  0608 06/02/24  0752   WBC 10*3/mm3 7.71 7.94   HEMOGLOBIN g/dL 13.1 13.0   HEMATOCRIT % 39.7 39.0   PLATELETS 10*3/mm3 195 204     Results from last 7 days   Lab Units 05/30/24  0433   INR  1.07   APTT seconds 28.0     Results from last 7 days   Lab Units 06/04/24  0741 06/03/24  0608   MAGNESIUM mg/dL 2.0 2.0           Invalid input(s): \"LDLCALC\"  Results from last 7 days   Lab Units 05/30/24  0433   PROBNP pg/mL 2,034.0*     Results from last 7 days   Lab Units 06/04/24  1006 05/31/24  0620   TSH uIU/mL 2.020 1.250     I reviewed the patient's new clinical results.  I personally viewed and interpreted the patient's EKG  Current Medications:   Scheduled Meds:amiodarone, 200 mg, Oral, Q24H  apixaban, 5 mg, Oral, Q12H  aspirin, 81 mg, " Oral, Daily  brimonidine, 1 drop, Both Eyes, BID  bumetanide, 1 mg, Oral, Daily  cefepime, 2,000 mg, Intravenous, Q12H  DULoxetine, 30 mg, Oral, Daily  hydrALAZINE, 25 mg, Oral, Q8H  insulin glargine, 20 Units, Subcutaneous, Daily  insulin lispro, 2-9 Units, Subcutaneous, 4x Daily AC & at Bedtime  latanoprost, 1 drop, Both Eyes, Nightly  metoprolol tartrate, 12.5 mg, Oral, Q12H  OLANZapine, 5 mg, Oral, Nightly  senna-docusate sodium, 2 tablet, Oral, BID  sodium chloride, 10 mL, Intravenous, Q12H  terazosin, 1 mg, Oral, Nightly      Continuous Infusions:     Allergies:  Allergies   Allergen Reactions    Ace Inhibitors Angioedema    Angiotensin Receptor Blockers Angioedema and Unknown (See Comments)     Angioneurotic edema    Dapagliflozin Other (See Comments)     UTI,  rectal abcess    Losartan Angioedema     Angioneurotic edema    Seroquel [Quetiapine] Hallucinations    Xanax [Alprazolam] Unknown - High Severity    Amlodipine Swelling    Ativan [Lorazepam] Hallucinations    Baclofen Hallucinations    Misc. Sulfonamide Containing Compounds Unknown (See Comments)    Minoxidil Confusion    Tetracycline Rash     bliisters in mouth         Assessment:     New onset afib with rvr- CHADSVASC2 of 5. Start Eliquis 5mg BID will be $112 a month. Back in SR on IV amiodarone. Switch to oral 200mg daily. Continue low dose BB. HR in the 50's use low dose amiodarone and check EKG tomorrow  RBBB- chronic  Urosepsis/Uti- gram negative on ABX  LLE celullitis- improving  Coronary artery disease- CABG 2019. On Asa, BB. No statin because LDL has been controlled. NO angina symptoms  Pulmonary hypertension- on revation  Mild bilateral carotid disease       Plan:           LEIDY Hall  06/05/24  13:50 EDT  Electronically signed by LEIDY Hall, 06/05/24, 1:50 PM EDT.

## 2024-06-05 NOTE — PROGRESS NOTES
PROGRESS NOTE      Patient Name: Dilip Verma  : 1950  MRN: [id number]  Primary Care Physician: Fernando Camargo DO  Date of admission: 2024    Patient Care Team:  Fernando Camargo DO as PCP - General (Family Medicine)  Morris Cai MD as Consulting Physician (Sleep Medicine)  Michaela Hylton MD as Consulting Physician (Cardiology)  Hardy Banerjee MD as Consulting Physician (Ophthalmology)  Chula Christianson MD as Consulting Physician (Ophthalmology)  Jason Sherman MD (Endocrinology)  Perla Mayen MD as Consulting Physician (Nephrology)  Federico Hebert MD as Consulting Physician (Pulmonary Disease)  Niraj Ravi MD as Consulting Physician (Orthopedic Surgery)  Papa Velasco MD as Consulting Physician (Urology)  Terese Yost MD as Consulting Physician (Gastroenterology)  Aracelis Ball MD as Consulting Physician (Colon and Rectal Surgery)  [facility] as Primary Care Provider        Reason for Follow up:     AMENA, CKD III      Subjective:     Seen and examined, no new complaints    Review of systems:  Constitutional: Seen and examined, alert awake, no distress  HEENT:  No headache, otalgia, itchy eyes, nasal discharge or sore throat.  Cardiac:  No chest pain, dyspnea, orthopnea or PND.  Chest:              No cough, phlegm or wheezing.  Abdomen:  No abdominal pain, nausea or vomiting.  Neuro:  No focal weakness, abnormal movements orseizure like activity.  :   Dysuria  ROS was otherwise negative except as mentioned in the [place].       Personal History:     Past Medical History:   Past Medical History:   Diagnosis Date    AMENA (acute kidney injury) 2020    Alcoholism 1989    not since     CORI positive     Anemia     Anxiety     Ataxia     Atypical chest pain 2022    SEEN AT [facility] ER    Balance disorder     Carotid stenosis 3/28/2024    Cataract     BILATERAL, S/P EXTRACTION    Cervical radiculopathy 2019    SEEN AT  [facility]  "ER    Cervical spinal cord compression     Chronic diastolic (congestive) heart failure     Chronic kidney disease     STAGE 3, FOLLOWED BY DR. VIVAR    Chronic pancreatitis     Closed left subtrochanteric femur fracture 12/01/2020    ADMITTED TO [facility]    Closed nondisplaced intertrochanteric fracture of left femur 12/01/2020    ADMITTED TO [facility]    Colon polyps     FOLLOWED BY DR. AVTAR JEONG    Constipation     Contracture, right hand     Coronary artery disease     CABG 7/2019    COVID-19 07/2022    DDD (degenerative disc disease), cervical     Diabetes mellitus, type II     IDDM    Diabetic retinopathy     Difficulty walking     Dysphagia     Elevated brain natriuretic peptide (BNP) level 08/2014    Elevated LFTs 03/2021    Erectile dysfunction     Fissure, anal 2022    Foot drop     Fuchs' corneal dystrophy of right eye     Glaucoma     BILATERAL    History of alcohol abuse     Hyperlipidemia     Hypersomnia     Hypertension     Hypertensive urgency 02/25/2020    ADMITTED TO [facility]    Insomnia     Kidney stones     LV dysfunction 06/2016    Lyme disease     Lymphadenopathy syndrome 05/2021    Macular edema     BILATERAL    Myocardial infarction 11/05/2019    NSTEMI, ADMITTED TO [facility]    Myocardial infarction 07/12/2019    NSTEMI, ADMITTED TO [facility]    Neuropathy in diabetes     Non-celiac gluten sensitivity     Orthostasis     Osteoporosis     Oxygen dependent     PAD (peripheral artery disease)     PNA (pneumonia) 06/2016    LEFT LOBE    Polyneuropathy     PTSD (post-traumatic stress disorder)     Pulmonary hypertension     Pulmonary nodule     Senile ectropion of both lower eyelids 02/2022    Sepsis 12/16/2020    D/T UTI, ADMITTED TO [facility]    Sleep apnea     STATES DOESN'T USE BIPAP OR CPAP    Spinal stenosis in cervical region     SEVERE-LIMITED ROM    Stroke 2012    \"slight stroke\"    Syncope and collapse 07/06/2019    ADMITTED TO [facility]    Urinary retention 04/05/2021    SEEN AT [facility] ER    Vision loss     Vitamin D " deficiency        Surgical History:      Past Surgical History:   Procedure Laterality Date    CARDIAC CATHETERIZATION N/A 07/15/2019    Procedure: Coronary angiography;  Surgeon: Carrie Price MD;  Location:  [facility]AS[facility];  Service: Cardiovascular    CARDIAC CATHETERIZATION N/A 07/15/2019    Procedure: Left Heart Cath;  Surgeon: Carrie Price MD;  Location:  [facility];  Service: Cardiovascular    CARDIAC CATHETERIZATION N/A 07/15/2019    Procedure: Left ventriculography;  Surgeon: Carrie Price MD;  Location:  [facility];  Service: Cardiovascular    CARDIAC CATHETERIZATION  07/15/2019    Procedure: Functional Flow [place];  Surgeon: Carrie Price MD;  Location:  [facility];  Service: Cardiovascular    CARDIAC CATHETERIZATION N/A 11/06/2019    Procedure: Right and Left Heart Cath;  Surgeon: Mya Smith MD;  Location:  [facility];  Service: Cardiovascular    CARDIAC CATHETERIZATION N/A 11/06/2019    Procedure: Coronary angiography;  Surgeon: Mya Smith MD;  [facility];  Service: Cardiovascular    CARDIAC SURGERY      CATARACT EXTRACTION Left 2014    CATARACT EXTRACTION Right 11/2016    PHACO/IOL, DR. LEN DENTON    COLONOSCOPY N/A 03/16/2023    ENTIRE COLON WNL, RESCOPE IN 5 YRS, DR. LINDA LIZARRAGA AT [facility]    COLONOSCOPY W/ POLYPECTOMY N/A 01/02/2015    A FEW DIVERTICULA IN SIGMOID, 6 MM TUBULOVILLOUS ADENOMA POLYP IN RECTUM, SMALL HEMORRHOIDS, MELANOSIS COLI, DR. AVTAR JEONG AT [facility]    CORONARY ARTERY BYPASS GRAFT N/A 07/18/2019    Procedure: INTRAOPERATIVE SHOAIB; STERNOTOMY CORONARY ARTERY BYPASS x 3  USING LEFT INTERNAL MAMMARY ARTERY GRAFT UTILIZING ENDOSCOPICALLY HARVESTED RIGHT GREATER SAPHENOUS VEIN AND PRP.;  Surgeon: Bill Devi MD;  Location: [facility] MAIN OR;  Service: Cardiothoracic    CYSTOSCOPY BLADDER STONE LITHOTRIPSY N/A     DENTAL PROCEDURE  Bilateral     3 surgeries inder implants    FEMUR IM NAILING/RODDING Left 12/03/2020    Procedure: LEFT HIP INTRAMEDULLARY NAIL;  Surgeon: Niraj Ravi MD;  Location: [facility] OR;  Service: Orthopedic Spine;  Laterality: Left;    INCISION AND DRAINAGE PERIRECTAL ABSCESS N/A 10/04/2023    Procedure: Incision and drainage of perianal and buttock abscess;  Surgeon: Herbie Villatoro MD;  Location: [facility] OR;  Service: General;  Laterality: N/A;    INGUINAL HERNIA REPAIR Bilateral     TOENAIL EXCISION  06/2022    TONSILLECTOMY Bilateral     TOTAL HIP ARTHROPLASTY REVISION Left 01/11/2022    Procedure: TOTAL HIP ARTHROPLASTY REVISION- POSTERIOR;  Surgeon: Jurgen Candelaria II, MD;  Location: [facility] OR;  Service: Orthopedics;  Laterality: Left;    VASECTOMY N/A        Family History: family history includes Alcohol abuse in his brother and paternal grandfather; Arrhythmia in his mother; Cancer in his mother; Depression in his mother and sister; Glaucoma in his maternal grandmother; Heart attack in his paternal grandmother; Heart disease in his father and mother; Heart failure in his father; Hyperlipidemia in his father and mother; Hypertension in his father and mother; Kidney disease in his father; Lung disease in his paternal uncle; Mental illness in his mother; Migraines in his mother; Stroke in his maternal grandmother and paternal grandmother; Thyroid disease in his father and paternal uncle; Uterine cancer in his mother. Otherwise pertinent FHx was reviewed and unremarkable.     Social History:  reports that he quit smoking about 24 years ago. His smoking use included cigarettes. He started smoking about 40 years ago. He has a 12 pack-year smoking history. He has been exposed to tobacco smoke. He has never used smokeless tobacco. He reports that he does not currently use alcohol. He reports that he does not use drugs.    Medications:  Prior to Admission medications    Medication Sig Start  Date End Date Taking? Authorizing Provider   aspirin 81 MG EC tablet Take 1 tablet by mouth Every Night.   Yes Heri Rinaldi MD   brimonidine (ALPHAGAN) 0.2 % ophthalmic solution Administer 1 drop to both eyes 2 (Two) Times a Day.   Yes Heri Rinaldi MD   bumetanide (BUMEX) 1 MG tablet Take 1 tablet by mouth Daily.   Yes Heri Rinaldi MD   Cholecalciferol (Vitamin D-3) 125 MCG (5000 UT) tablet Take 1 tablet by mouth Daily.   Yes Heri Rinaldi MD   DULoxetine (CYMBALTA) 30 MG capsule Take 1 capsule by mouth Every Night. 1/16/24  Yes Heri Rinaldi MD   Insulin Glargine, 2 Unit Dial, (TOUJEO) 300 UNIT/ML solution pen-injector injection Inject 24 Units under the skin into the appropriate area as directed Daily.   Yes Heri Rinaldi MD   insulin lispro (humaLOG) 100 UNIT/ML injection Inject 0-14 Units under the skin into the appropriate area as directed 3 (Three) Times a Day Before Meals.  Patient taking differently: Inject 0-14 Units under the skin into the appropriate area as directed 3 (Three) Times a Day Before Meals. SLIDING SCALE  100-150 - 4 units  151-200 - 5 units  201-250 - 6 units  251-300 - 7 units  301-350 - 8 units  351-400 - 9 units  401+ - 10 units 12/7/20  Yes Amador Valverde MD   latanoprost (XALATAN) 0.005 % ophthalmic solution Administer 1 drop to both eyes every night at bedtime. 1/16/23  Yes Heri Rinaldi MD   multivitamin with minerals (MULTIVITAMIN ADULTS PO) Take 1 tablet by mouth Daily.   Yes Heri Rinaldi MD   testosterone (ANDROGEL) 25 MG/2.5GM (1%) gel gel Place 25 mg on the skin as directed by provider 2 (Two) Times a Week.   Yes Heri Rinaldi MD   traMADol (ULTRAM) 50 MG tablet Take 1 tablet by mouth At Night As Needed. 10/16/23  Yes Heri Rinaldi MD   Magnesium 400 MG capsule Take 400 mg by mouth As Needed.    Heri Rinaldi MD   sildenafil (REVATIO) 20 MG tablet Take 1 tablet by mouth As Needed.     Provider, MD Heri     Scheduled Meds:aspirin, 81 mg, Oral, Daily  brimonidine, 1 drop, Both Eyes, BID  bumetanide, 1 mg, Oral, Daily  cefepime, 2,000 mg, Intravenous, Q12H  DULoxetine, 30 mg, Oral, Daily  enoxaparin, 50 mg, Subcutaneous, Once  enoxaparin, 1 mg/kg, Subcutaneous, Q12H  hydrALAZINE, 25 mg, Oral, Q8H  insulin glargine, 20 Units, Subcutaneous, Daily  insulin lispro, 2-9 Units, Subcutaneous, 4x Daily AC & at Bedtime  latanoprost, 1 drop, Both Eyes, Nightly  metoprolol tartrate, 12.5 mg, Oral, Q12H  OLANZapine, 5 mg, Oral, Nightly  senna-docusate sodium, 2 tablet, Oral, BID  sodium chloride, 10 mL, Intravenous, Q12H  terazosin, 1 mg, Oral, Nightly      Continuous Infusions:amiodarone, 0.5 mg/min, Last Rate: 0.5 mg/min (06/05/24 0533)      PRN Meds:  acetaminophen **OR** acetaminophen **OR** acetaminophen    senna-docusate sodium **AND** polyethylene glycol **AND** bisacodyl **AND** bisacodyl    dextrose    dextrose    glucagon (human recombinant)    nitroglycerin    OLANZapine    ondansetron    sodium chloride    sodium chloride    traMADol  Allergies:    Allergies   Allergen Reactions    Ace Inhibitors Angioedema    Angiotensin Receptor Blockers Angioedema and Unknown (See Comments)     Angioneurotic edema    Dapagliflozin Other (See Comments)     UTI,  rectal abcess    Losartan Angioedema     Angioneurotic edema    Seroquel [Quetiapine] Hallucinations    Xanax [Alprazolam] Unknown - High Severity    Amlodipine Swelling    Ativan [Lorazepam] Hallucinations    Baclofen Hallucinations    Misc. Sulfonamide Containing Compounds Unknown (See Comments)    Minoxidil Confusion    Tetracycline Rash     bliisters in mouth         Objective   Exam:     Vital Signs  Temp:  [98.2 °F (36.8 °C)-98.4 °F (36.9 °C)] 98.2 °F (36.8 °C)  Heart Rate:  [55-74] 71  Resp:  [17-23] 23  BP: ()/() 137/89  SpO2:  [91 %-100 %] 96 %  on  Flow (L/min):  [3] 3;   Device (Oxygen Therapy): nasal cannula  Body mass index  is 31.02 kg/m².  EXAM  General:  Chronically ill appearing male in no acute distress.    Head:      Normocephalic and atraumatic.    Eyes:      PERRL/EOM intact, conjunctivae and sclerae clear without nystagmus.    Neck:      No masses, thyromegaly,  trachea central   Lungs:    Clear bilaterally to auscultation.    Heart:      Regular rate and rhythm, no murmur no gallop  Abd:        Soft, nontender, not distended, bowel sounds positive, no shifting dullness.  Msk:        No deformity or scoliosis noted of thoracic or lumbar spine.    Pulses:   Pulses normal in all 4 extremities.    Extremities:        No cyanosis or clubbing- no edema.    Neuro:    No focal deficits.   alert oriented x3  Skin:       Intact without lesions or rashes.    Psych:    Alert and cooperative; normal mood and affect; normal attention span       Results Review:  I have personally reviewed most recent Data :  [facility]  CBC    Results from last 7 days   Lab Units 06/03/24  0608 06/02/24  0752 06/01/24  0551 05/31/24  0620 05/30/24  0433   WBC 10*3/mm3 7.71 7.94 8.26 14.38* 30.36*   HEMOGLOBIN g/dL 13.1 13.0 13.2 13.4 16.4   PLATELETS 10*3/mm3 195 204 168 184 230     CMP   Results from last 7 days   Lab Units 06/04/24  0741 06/03/24  0608 06/02/24  0752 06/01/24  0551 05/31/24  0620 05/30/24  0955 05/30/24  0433   SODIUM mmol/L 140 140 141 138 137 140 138   POTASSIUM mmol/L 4.1 3.9 4.2 4.5 4.4 4.0 5.5*   CHLORIDE mmol/L 106 105 107 106 102 107 98   CO2 mmol/L 25.0 23.1 24.0 23.4 27.0 24.6 29.2*   BUN mg/dL 17 17 23 31* 32* 22 24*   CREATININE mg/dL 1.29* 1.07 0.94 1.22 1.51* 0.90 1.17   GLUCOSE mg/dL 129* 105* 74 166* 97 228* 306*   ALBUMIN g/dL  --  3.2* 3.2*  --  3.3*  --  4.2   BILIRUBIN mg/dL  --   --   --   --  0.6  --  1.3*   ALK PHOS U/L  --   --   --   --  60  --  86   AST (SGOT) U/L  --   --   --   --  30  --  33   ALT (SGPT) U/L  --   --   --   --  18  --  31   AMMONIA umol/L  --   --   --   --   --   --  20      ABG    Results from last 7 days   Lab Units 05/30/24  0505   PH, ARTERIAL pH units 7.420   PCO2, ARTERIAL mm Hg 44.0   PO2 ART mm Hg 56.9*   O2 SATURATION ART % 89.5*   BASE EXCESS ART mmol/L 3.3*     CT Head Without Contrast    Result Date: 6/3/2024   No acute process identified.    Radiation dose reduction techniques were utilized, including automated exposure control and exposure modulation based on body size.   This report was finalized on 6/3/2024 9:52 AM by Dr. Tad Godfrey M.D on Workstation: [id number]      US Renal Bilateral    Result Date: 5/31/2024  Thickened urinary bladder wall. Low volume bladder. No hydronephrosis.  This report was finalized on 5/31/2024 6:25 PM by Dr. Zechariah Falcon M.D on Workstation: [id number]      CT Abdomen Pelvis With Contrast    Result Date: 5/30/2024   1. Diffuse urinary bladder wall thickening and mild perivesical fat stranding, which could be accentuated by under distention but could reflect a nonspecific cystitis but recommend correlating with urinalysis. 2. Low lung volumes with multifocal bilateral perihilar and bibasilar opacities that likely represent subsegmental atelectasis and/or scarring. 3. Likely benign 9 mm subpleural nodule in the base of the lingula and likely reactive mediastinal and bilateral hilar lymphadenopathy given the long-term stability. 4. Nonspecific fat stranding in the left inguinal region. Correlate for recent trauma or instrumentation. 5. Chronic appearing splenic vein thrombosis with peripheral calcification and multiple venous collaterals in the left upper quadrant. Additional pancreatic/peripancreatic calcifications could reflect sequela of chronic pancreatitis. 6. Mildly heterogeneous hepatic attenuation with subtle nodular liver contours, which could reflect underlying hepatocellular disease/cirrhosis. 7. Tiny hiatal hernia with mild nonspecific thickening of the distal esophageal wall and mild gastric wall thickening that  could be accentuated by under distention. Correlate for GERD and possible gastritis.  This report was finalized on 5/30/2024 7:35 AM by Julio Thomas MD on Workstation: [id number]      CT Chest With Contrast Diagnostic    Result Date: 5/30/2024   1. Diffuse urinary bladder wall thickening and mild perivesical fat stranding, which could be accentuated by under distention but could reflect a nonspecific cystitis but recommend correlating with urinalysis. 2. Low lung volumes with multifocal bilateral perihilar and bibasilar opacities that likely represent subsegmental atelectasis and/or scarring. 3. Likely benign 9 mm subpleural nodule in the base of the lingula and likely reactive mediastinal and bilateral hilar lymphadenopathy given the long-term stability. 4. Nonspecific fat stranding in the left inguinal region. Correlate for recent trauma or instrumentation. 5. Chronic appearing splenic vein thrombosis with peripheral calcification and multiple venous collaterals in the left upper quadrant. Additional pancreatic/peripancreatic calcifications could reflect sequela of chronic pancreatitis. 6. Mildly heterogeneous hepatic attenuation with subtle nodular liver contours, which could reflect underlying hepatocellular disease/cirrhosis. 7. Tiny hiatal hernia with mild nonspecific thickening of the distal esophageal wall and mild gastric wall thickening that could be accentuated by under distention. Correlate for GERD and possible gastritis.  This report was finalized on 5/30/2024 7:35 AM by Julio Thomas MD on Workstation: [id number]      XR Chest 1 View    Result Date: 5/30/2024  Nonspecific bibasilar consolidation.  This report was finalized on 5/30/2024 4:47 AM by Dr. Gayle Okeefe M.D on Workstation: [id number]       Results for orders placed during the hospital encounter of 05/30/24    Adult Transthoracic Echo Complete W/ Cont if Necessary Per Protocol    Interpretation Summary    Left ventricular systolic  function is hyperdynamic (EF > 70%). Calculated left ventricular EF = 75% Global longitudinal LV strain (GLS) = -17.8%. Left ventricle strain data was reviewed by the physician and found to be accurate. Global longitudinal LV strain is normal however there is regional abnormality with apical sparing suggestive of amyloid heart disease. However there is also basal ptal hypertrophy suggestive of hypertrophic cardiomyopathy. Wall motion abnormality is also noted in the basal septum and cannot rule out ischemic component.Consider additional imaging such as cardiac MRI and further evaluation also for amyloid heart disease as clinically indicated. The left ventricular cavity is small in size. Left ventricular wall thickness is consistent with moderate to severe septal asymmetric hypertrophy. There is hypokinesis of the left ventricular basal septum. Left ventricular diastolic function is consistent with (grade II w/high LAP) pseudonormalization.    The left atrial cavity is mildly dilated.    No aortic valve regurgitation or stenosis is present. The aortic valve is abnormal in structure. There is mild thickening of the aortic valve.    There is mild, bileaflet mitral valve thickening present. Trace mitral valve regurgitation is present. No significant mitral valve stenosis is present.    Moderate tricuspid valve regurgitation is present. Estimated right ventricular systolic pressure from tricuspid regurgitation is markedly elevated (>55 mmHg). Calculated right ventricular systolic pressure from tricuspid regurgitation is 74 mmHg.        Assessment & Plan   Assessment and Plan:         Acute UTI (urinary tract infection)    Type 2 diabetes mellitus with hyperglycemia, with long-term current use of insulin    CAD (coronary artery disease)    Hx of CABG    Chronic diastolic heart failure    CSA (central sleep apnea)    HTN (hypertension)    History of stroke    CKD (chronic kidney disease) stage 3, GFR 30-59 ml/min     Peripheral arterial disease    Sepsis    Hyperkalemia    Metabolic encephalopathy    AMENA (acute kidney injury)    ASSESSMENT:  AMENA likely prerenal ATN 2/2 urosepsis with hemodynamic changes; on CKD III etiology likely diabetic and hypertensive nephropathy with baseline sCr ~0.9-1.2 mg/dL  Hyperkalemia, now resolved  UTI, urine cultures with gram neg bacilli  Leukocytosis  Metabolic encephalopathy  PAD  Hx stroke  HTN  CHF  CAD  DM2  Chronic splenic vein thrombosis  Possible hepatocellular disease/cirrhosis  Likely benign subpleural nodule with bilateral lymphadenopathy  Hiatal hernia    Last TTE 10/5/22 with EF 66%, grade II DD    PLAN :     AMENA likely prerenal ATN 2/2 urosepsis with hemodynamic changes; on CKD III etiology likely diabetic and hypertensive nephropathy with baseline sCr ~0.9-1.2 mg/dL  Renal function stable, close to baseline  On Bumex 1mg PO daily, continue  Volume with CXR with bibasilar consolidation,   Electrolytes, acid/base acceptable  Followed by infectious disease, evaluation noted, appreciate assistance.  BP trends acceptable  Cardiology following for new onset A-fib, evaluation noted  Avoid NSAIDs, nephrotoxic agents  We will follow and coordinate with team  Overall stable from renal standpoint  Discussed with family      Perla Mayen MD  [facility]  6/5/2024  08:07 EDT

## 2024-06-05 NOTE — DISCHARGE PLACEMENT REQUEST
"Dilip Reilly \"Ck\" (74 y.o. Male)       Date of Birth   1950    Social Security Number       Address   [address] [place] [address]    Home Phone   [phone]    MRN   [id number]       Shinto   Non-Sikhism    Marital Status                               Admission Date   5/30/24    Admission Type   Emergency    Admitting Provider   Jaquan Grossman MD    Attending Provider   Que Gregg MD    Department, Room/Bed   [address], [address]       Discharge Date       Discharge Disposition       Discharge Destination                                 Attending Provider: Que Gregg MD    Allergies: Ace Inhibitors, Angiotensin Receptor Blockers, Dapagliflozin, Losartan, Seroquel [Quetiapine], Xanax [Alprazolam], Amlodipine, Ativan [Lorazepam], Baclofen, Misc. Sulfonamide Containing Compounds, Minoxidil, Tetracycline    Isolation: None   Infection: None   Code Status: CPR    Ht: 172.7 cm (68\")   Wt: 92.5 kg (204 lb)    Admission Cmt: None   Principal Problem: Acute UTI (urinary tract infection) [N39.0]                   Active Insurance as of 5/30/2024       Primary Coverage       Payor Plan Insurance Group Employer/Plan Group    [employer] MEDICARE REPLACEMENT [employer] MED ADV GROUP [id number]       Payor Plan Address Payor Plan Phone Number Payor Plan Fax Number Effective Dates    [address] [address]   1/1/2023 - None Entered    [facility] [id number]         Subscriber Name Subscriber Birth Date Member ID       DILIP REILLY 1950 [id number]                     Emergency Contacts        (Rel.) Home Phone Work Phone Mobile Phone    BayronBertinn (Spouse) [phone] -- [phone]    SILVIA REILLY (Son) [phone] -- [phone]                "

## 2024-06-05 NOTE — PLAN OF CARE
Goal Outcome Evaluation:  Plan of Care Reviewed With: patient, spouse        Progress: improving  Outcome Evaluation: Pt and wife have strong desire to go home with home health, PT cont to rec SNF due to his mobility limitations, wife has been caring for pt prior to admission and feels that she can handle him at home. Pt did stand with assist of one, he walked 25 ft with min assist of 1 with a rolling walker and R platform attachment, he fatigues quickly and his gait quality is poor due to his significant B LE weakness. Wife reports that they have an upright walker at home which would address the lack of R , rec they use that walker and pt has constant hands on assist, and use wheelchair as needed and home health. Today we had assist of 2 for safety and RN present and followed with chair. Pt took 3 walks :15 ft, 20 ft, 30 ft, pt easily distracted and needed frequent verbal cues for safety. PT will cont to follow      Anticipated Discharge Disposition (PT): home with 24/7 care, home with home health, skilled nursing facility (home with spouse v SNF depending on progress with mobility)

## 2024-06-05 NOTE — THERAPY TREATMENT NOTE
Patient Name: Dilip Verma  : 1950    MRN: [id number]                              Today's Date: 2024       Admit Date: 2024    Visit Dx:     ICD-10-CM ICD-9-CM   1. Acute respiratory failure with hypoxia  J96.01 518.81   2. Sepsis without acute organ dysfunction, due to unspecified organism  A41.9 038.9     995.91   3. Hyperkalemia  E87.5 276.7   4. Metabolic encephalopathy  G93.41 348.31   5. Acute UTI  N39.0 599.0   6. Hyperglycemia due to diabetes mellitus  E11.65 250.02   7. Chronic heart failure with preserved ejection fraction (HFpEF)  I50.32 428.9   8. Acute UTI (urinary tract infection)  N39.0 599.0     Patient Active Problem List   Diagnosis    Anxiety    Colon polyp    Type 2 diabetes mellitus with hyperglycemia, with long-term current use of insulin    Erectile dysfunction    Hyperlipidemia    Type 2 acute myocardial infarction    CAD (coronary artery disease)    Hx of CABG    Chronic diastolic heart failure    Hypersomnia due to medical condition    CSA (central sleep apnea)    Periodic breathing    HTN (hypertension)    History of stroke    CKD (chronic kidney disease) stage 3, GFR 30-59 ml/min    Urinary retention    Acute on chronic renal failure    Acute UTI (urinary tract infection)    Acute on chronic diastolic (congestive) heart failure    Bacteriuria    Rectal pain    Status post total replacement of hip    Vitamin D deficiency    Post-acute COVID-19 syndrome    Insulin dose changed    Arthropathy associated with neurological disorder    Personal history of colonic polyps    Type 2 diabetes mellitus with diabetic neuropathy, with long-term current use of insulin    Cervical spinal stenosis    Abscess of skin    Overweight    Cervical stenosis of spine    Spinal stenosis, lumbar region, without neurogenic claudication    Medically noncompliant    Chronic heart failure with preserved ejection fraction (HFpEF)    Carotid stenosis    Peripheral arterial disease    Sepsis     Hyperkalemia    Metabolic encephalopathy    AMENA (acute kidney injury)     Past Medical History:   Diagnosis Date    AMENA (acute kidney injury) 12/03/2020    Alcoholism 1989    not since 1998    CORI positive     Anemia     Anxiety     Ataxia     Atypical chest pain 04/19/2022    SEEN AT [facility] ER    Balance disorder     Carotid stenosis 3/28/2024    Cataract     BILATERAL, S/P EXTRACTION    Cervical radiculopathy 11/11/2019    SEEN AT  [facility] ER    Cervical spinal cord compression     Chronic diastolic (congestive) heart failure     Chronic kidney disease     STAGE 3, FOLLOWED BY DR. VIVAR    Chronic pancreatitis     Closed left subtrochanteric femur fracture 12/01/2020    ADMITTED TO [facility]    Closed nondisplaced intertrochanteric fracture of left femur 12/01/2020    ADMITTED TO [facility]    Colon polyps     FOLLOWED BY DR. AVTAR JEONG    Constipation     Contracture, right hand     Coronary artery disease     CABG 7/2019    COVID-19 07/2022    DDD (degenerative disc disease), cervical     Diabetes mellitus, type II     IDDM    Diabetic retinopathy     Difficulty walking     Dysphagia     Elevated brain natriuretic peptide (BNP) level 08/2014    Elevated LFTs 03/2021    Erectile dysfunction     Fissure, anal 2022    Foot drop     Fuchs' corneal dystrophy of right eye     Glaucoma     BILATERAL    History of alcohol abuse     Hyperlipidemia     Hypersomnia     Hypertension     Hypertensive urgency 02/25/2020    ADMITTED TO [facility]    Insomnia     Kidney stones     LV dysfunction 06/2016    Lyme disease     Lymphadenopathy syndrome 05/2021    Macular edema     BILATERAL    Myocardial infarction 11/05/2019    NSTEMI, ADMITTED TO [facility]    Myocardial infarction 07/12/2019    NSTEMI, ADMITTED TO [facility]    Neuropathy in diabetes     Non-celiac gluten sensitivity     Orthostasis     Osteoporosis     Oxygen dependent     PAD (peripheral artery disease)     PNA (pneumonia) 06/2016    LEFT LOBE    Polyneuropathy     PTSD (post-traumatic stress  "disorder)     Pulmonary hypertension     Pulmonary nodule     Senile ectropion of both lower eyelids 02/2022    Sepsis 12/16/2020    D/T UTI, ADMITTED TO [facility]    Sleep apnea     STATES DOESN'T USE BIPAP OR CPAP    Spinal stenosis in cervical region     SEVERE-LIMITED ROM    Stroke 2012    \"slight stroke\"    Syncope and collapse 07/06/2019    ADMITTED TO [facility]    Urinary retention 04/05/2021    SEEN AT [facility] ER    Vision loss     Vitamin D deficiency      Past Surgical History:   Procedure Laterality Date    CARDIAC CATHETERIZATION N/A 07/15/2019    Procedure: Coronary angiography;  Surgeon: Carrie Price MD;  Location:  [facility];  Service: Cardiovascular    CARDIAC CATHETERIZATION N/A 07/15/2019    Procedure: Left Heart Cath;  Surgeon: Carrie Price MD;  Location:  [facility];  Service: Cardiovascular    CARDIAC CATHETERIZATION N/A 07/15/2019    Procedure: Left ventriculography;  Surgeon: Carrie Price MD;  Location:  [facility];  Service: Cardiovascular    CARDIAC CATHETERIZATION  07/15/2019    Procedure: Functional Flow [facility];  Surgeon: Carrie Price MD;  Location:  [facility];  Service: Cardiovascular    CARDIAC CATHETERIZATION N/A 11/06/2019    Procedure: Right and Left Heart Cath;  Surgeon: Mya Smith MD;  Location:  [facility];  Service: Cardiovascular    CARDIAC CATHETERIZATION N/A 11/06/2019    Procedure: Coronary angiography;  Surgeon: Mya Smith MD;  Location:  [facility];  Service: Cardiovascular    CARDIAC SURGERY      CATARACT EXTRACTION Left 2014    CATARACT EXTRACTION Right 11/2016    PHACO/IOL, DR. LEN DENTON    COLONOSCOPY N/A 03/16/2023    ENTIRE COLON WNL, RESCOPE IN 5 YRS, DR. LINDA LIZARRAGA AT [facility]    COLONOSCOPY W/ POLYPECTOMY N/A 01/02/2015    A FEW DIVERTICULA IN SIGMOID, 6 MM TUBULOVILLOUS ADENOMA POLYP IN RECTUM, SMALL HEMORRHOIDS, MELANOSIS COLI, DR. NOVA " JEAN-PAUL AT [facility]    CORONARY ARTERY BYPASS GRAFT N/A 07/18/2019    Procedure: INTRAOPERATIVE SHOAIB; STERNOTOMY CORONARY ARTERY BYPASS x 3  USING LEFT INTERNAL MAMMARY ARTERY GRAFT UTILIZING ENDOSCOPICALLY HARVESTED RIGHT GREATER SAPHENOUS VEIN AND PRP.;  Surgeon: Bill Devi MD;  Location: [facility];  Service: Cardiothoracic    CYSTOSCOPY BLADDER STONE LITHOTRIPSY N/A     DENTAL PROCEDURE Bilateral     3 surgeries inder implants    FEMUR IM NAILING/RODDING Left 12/03/2020    Procedure: LEFT HIP INTRAMEDULLARY NAIL;  Surgeon: Niraj Ravi MD;  Location: [facility];  Service: Orthopedic Spine;  Laterality: Left;    INCISION AND DRAINAGE PERIRECTAL ABSCESS N/A 10/04/2023    Procedure: Incision and drainage of perianal and buttock abscess;  Surgeon: Herbie Villatoro MD;  Location: [facility];  Service: General;  Laterality: N/A;    INGUINAL HERNIA REPAIR Bilateral     TOENAIL EXCISION  06/2022    TONSILLECTOMY Bilateral     TOTAL HIP ARTHROPLASTY REVISION Left 01/11/2022    Procedure: TOTAL HIP ARTHROPLASTY REVISION- POSTERIOR;  Surgeon: Jurgen Candelaria II, MD;  Location: [facility];  Service: Orthopedics;  Laterality: Left;    VASECTOMY N/A       General Information       Row Name 06/05/24 1502          Physical Therapy Time and Intention    Document Type therapy note (daily note)  -PC     Mode of Treatment physical therapy  -PC       Row Name 06/05/24 1502          General Information    Existing Precautions/Restrictions fall  -PC     Barriers to Rehab medically complex;cognitive status;previous functional deficit  -PC       Row Name 06/05/24 1502          Safety Issues, Functional Mobility    Comment, Safety Issues/Impairments (Mobility) pt needed frequent cues to stay on task, easily distracted, and has dec safety awareness  -PC               User Key  (r) = Recorded By, (t) = Taken By, (c) = Cosigned By      Initials Name Provider Type    PC Gemma Hamilton, PT  Physical Therapist                   Mobility       Row Name 06/05/24 1503          Bed Mobility    Supine-Sit [place]on (Bed Mobility) moderate assist (50% patient effort)  -PC       Row Name 06/05/24 1503          Sit-Stand Transfer    Sit-Stand [place]on (Transfers) minimum assist (75% patient effort)  -PC     Assistive Device (Sit-Stand Transfers) walker, front-wheeled  -PC     Comment, (Sit-Stand Transfer) sit to stand x 3, working on good body mechanics and safety, pt was able to stand with min assist on 3rd attempt  -PC       Row Name 06/05/24 1503          Gait/Stairs (Locomotion)    Avalon Level (Gait) minimum assist (75% patient effort);1 person assist;1 person to manage equipment  -PC     Assistive Device (Gait) walker, platform;walker, front-wheeled  -PC     Distance in Feet (Gait) 20  15 ft, 20 ft, 25 ft  -PC     Deviations/Abnormal Patterns (Gait) gait speed decreased;stride length decreased;steppage;farhana decreased;ataxic;bilateral deviations  -PC     Bilateral Gait Deviations forward flexed posture;heel strike decreased;knee hyperextension;foot drop/toe drag  -PC     Comment, (Gait/Stairs) used a platform attachment on RUE today due to pt inability to grasp walker with R hand, this helped alot, pt cont to have poor gait quality due to B LE weakness, B foot drop, B knee hyperextension  -PC               User Key  (r) = Recorded By, (t) = Taken By, (c) = Cosigned By      Initials Name Provider Type    PC Gemma Hamilton PT Physical Therapist                   Obj/Interventions       Row Name 06/05/24 1509          Balance    Static Sitting Balance contact guard  -PC     Dynamic Sitting Balance minimal assist  -PC     Position, Sitting Balance sitting edge of bed  -PC     Static Standing Balance minimal assist  -PC     Dynamic Standing Balance moderate assist  -PC               User Key  (r) = Recorded By, (t) = Taken By, (c) = Cosigned By      Initials Name Provider Type    CECE Hamilton  Gemma JACKMAN PT Physical Therapist                   Goals/Plan    No documentation.                  Clinical Impression       Row Name 06/05/24 1511          Pain    Pretreatment Pain Rating 0/10 - no pain  -PC       Row Name 06/05/24 1511          Plan of Care Review    Plan of Care Reviewed With patient;spouse  -PC     Progress improving  -PC     Outcome Evaluation Pt and wife have strong desire to go home with home health, PT cont to rec SNF due to his mobility limitations, wife has been caring for pt prior to admission and feels that she can handle him at home. Pt did stand with assist of one, he walked 25 ft with min assist of 1 with a rolling walker and R platform attachment, he fatigues quickly and his gait quality is poor due to his significant B LE weakness. Wife reports that they have an upright walker at home which would address the lack of R , rec they use that walker and pt has constant hands on assist, and use wheelchair as needed and home health. Today we had assist of 2 for safety and RN present and followed with chair. Pt took 3 walks :15 ft, 20 ft, 30 ft, pt easily distracted and needed frequent verbal cues for safety. PT will cont to follow  -PC       Row Name 06/05/24 1511          Positioning and Restraints    Pre-Treatment Position in bed  -PC     Post Treatment Position chair  -PC     In Chair reclined;call light within reach;encouraged to call for assist;exit alarm on;with family/caregiver  -PC               User Key  (r) = Recorded By, (t) = Taken By, (c) = Cosigned By      Initials Name Provider Type    PC Gemma Hamilton, PT Physical Therapist                   Outcome Measures       Row Name 06/05/24 1518 06/05/24 1400       How much help from another person do you currently need...    Turning from your back to your side while in flat bed without using bedrails? 3  -PC 3  -BB    Moving from lying on back to sitting on the side of a flat bed without bedrails? 3  -PC 3  -BB    Moving to  and from a bed to a chair (including a wheelchair)? 3  -PC 2  -BB    Standing up from a chair using your arms (e.g., wheelchair, bedside chair)? 3  -PC 2  -BB    Climbing 3-5 steps with a railing? 1  -PC 1  -BB    To walk in hospital room? 3  -PC 2  -BB    AM-PAC 6 Clicks Score (PT) 16  -PC 13  -BB    Highest Level of Mobility Goal 5 --> Static standing  -PC 4 --> Transfer to chair/commode  -BB              User Key  (r) = Recorded By, (t) = Taken By, (c) = Cosigned By      Initials Name Provider Type    Gemma Lang PT Physical Therapist    BB Eulalia Mensah, RN Registered Nurse                                 Physical Therapy Education       Title: PT OT SLP Therapies (Done)       Topic: Physical Therapy (Done)       Point: Mobility training (Done)       Learning Progress Summary             Patient Acceptance, E,D, DU by PC at 6/5/2024 1518    Acceptance, E,D, DU,NR by PC at 6/4/2024 1557    Acceptance, E,D, DU by PC at 6/2/2024 1427    Acceptance, E,TB,D, VU by CB at 6/1/2024 2218    Acceptance, E,D, DU by PC at 5/31/2024 1612   Family Acceptance, E,TB,D, VU by CB at 6/1/2024 2218                         Point: Home exercise program (Done)       Learning Progress Summary             Patient Acceptance, E,D, DU by PC at 6/5/2024 1518    Acceptance, E,D, DU,NR by PC at 6/4/2024 1557    Acceptance, E,D, DU by PC at 6/2/2024 1427    Acceptance, E,TB,D, VU by CB at 6/1/2024 2218    Acceptance, E,D, DU by PC at 5/31/2024 1612   Family Acceptance, E,TB,D, VU by CB at 6/1/2024 2218                         Point: Body mechanics (Done)       Learning Progress Summary             Patient Acceptance, E,D, DU by PC at 6/5/2024 1518    Acceptance, E,D, DU,NR by PC at 6/4/2024 1557    Acceptance, E,D, DU by PC at 6/2/2024 1427    Acceptance, E,TB,D, VU by CB at 6/1/2024 2218    Acceptance, E,D, DU by PC at 5/31/2024 1612   Family Acceptance, E,TB,D, VU by CB at 6/1/2024 2218                         Point: Precautions  (Done)       Learning Progress Summary             Patient Acceptance, E,D, DU by PC at 6/5/2024 1518    Acceptance, E,D, DU,NR by PC at 6/4/2024 1557    Acceptance, E,D, DU by PC at 6/2/2024 1427    Acceptance, E,TB,D, VU by CB at 6/1/2024 2218    Acceptance, E,D, DU by PC at 5/31/2024 1612   Family Acceptance, E,TB,D, VU by CB at 6/1/2024 2218                                         User Key       Initials Effective Dates Name Provider Type Discipline    PC 06/16/21 -  Gemma Hamilton, PT Physical Therapist PT    CB 02/21/24 -  Murphy Herndon, RN Registered Nurse Nurse                  PT Recommendation and Plan  Planned Therapy Interventions (PT): balance training, bed mobility training, gait training, transfer training, strengthening  Plan of Care Reviewed With: patient, spouse  Progress: improving  Outcome Evaluation: Pt and wife have strong desire to go home with home health, PT cont to rec SNF due to his mobility limitations, wife has been caring for pt prior to admission and feels that she can handle him at home. Pt did stand with assist of one, he walked 25 ft with min assist of 1 with a rolling walker and R platform attachment, he fatigues quickly and his gait quality is poor due to his significant B LE weakness. Wife reports that they have an upright walker at home which would address the lack of R , rec they use that walker and pt has constant hands on assist, and use wheelchair as needed and home health. Today we had assist of 2 for safety and RN present and followed with chair. Pt took 3 walks :15 ft, 20 ft, 30 ft, pt easily distracted and needed frequent verbal cues for safety. PT will cont to follow     Time Calculation:         PT Charges       Row Name 06/05/24 1513             Time Calculation    Start Time 1405  -PC      Stop Time 1459  -PC      Time Calculation (min) 54 min  -PC      PT Received On 06/05/24  -PC      PT - Next Appointment 06/06/24  -PC                User Key  (r) = Recorded By,  (t) = Taken By, (c) = Cosigned By      Initials Name Provider Type    PC Gemma Hamilton, PT Physical Therapist                  Therapy Charges for Today       Code Description Service Date Service Provider Modifiers Qty    03094053118 HC PT THER PROC EA 15 MIN 6/4/2024 Gemma Hamilton, PT GP 2    98460456797 HC PT THER PROC EA 15 MIN 6/5/2024 Gemma Hamilton, PT GP 1    94024466837 HC GAIT TRAINING EA 15 MIN 6/5/2024 Gemma Hamilton, PT GP 2            PT G-Codes  Outcome Measure Options: AM-PAC 6 Clicks Daily Activity (OT)  AM-PAC 6 Clicks Score (PT): 16  AM-PAC 6 Clicks Score (OT): 15  PT Discharge Summary  Anticipated Discharge Disposition (PT): home with 24/7 care, home with home health, skilled nursing facility (home with spouse v SNF depending on progress with mobility)    Gemma Hamilton, PT  6/5/2024

## 2024-06-05 NOTE — PLAN OF CARE
Goal Outcome Evaluation:        Pt disoriented to situation & place. Unable to follow commands or one-step directions.   Unable to participate in conversations d/t illogical speech patterns. Very impulsive & uncooperative w/ care. Restless all night, removing equipment. Hallucinations.   VSS. NSR on monitor, on 3L NC. Quickly desats to 70's when O2 removed.   IV Amio gtt infusing along w/ IV ABX   Incontinent of B&B. Pure wick in use.   Heavy Ax2 w/ mobility. Able to reposition self in bed. R hand contracted  Bed alarm on  Dressing to RLE remains in place, C/D/I

## 2024-06-06 PROBLEM — I48.0 PAROXYSMAL ATRIAL FIBRILLATION: Status: ACTIVE | Noted: 2024-06-06

## 2024-06-06 LAB
ANION GAP SERPL CALCULATED.3IONS-SCNC: 12.3 MMOL/L (ref 5–15)
ANION GAP SERPL CALCULATED.3IONS-SCNC: 12.8 MMOL/L (ref 5–15)
ANION GAP SERPL CALCULATED.3IONS-SCNC: 15.7 MMOL/L (ref 5–15)
BASOPHILS # BLD AUTO: 0.03 10*3/MM3 (ref 0–0.2)
BASOPHILS NFR BLD AUTO: 0.3 % (ref 0–1.5)
BUN SERPL-MCNC: 27 MG/DL (ref 8–23)
BUN SERPL-MCNC: 28 MG/DL (ref 8–23)
BUN SERPL-MCNC: 29 MG/DL (ref 8–23)
BUN/CREAT SERPL: 15.2 (ref 7–25)
BUN/CREAT SERPL: 15.3 (ref 7–25)
BUN/CREAT SERPL: 18 (ref 7–25)
CALCIUM SPEC-SCNC: 8.6 MG/DL (ref 8.6–10.5)
CALCIUM SPEC-SCNC: 8.6 MG/DL (ref 8.6–10.5)
CALCIUM SPEC-SCNC: 8.7 MG/DL (ref 8.6–10.5)
CHLORIDE SERPL-SCNC: 106 MMOL/L (ref 98–107)
CO2 SERPL-SCNC: 20.3 MMOL/L (ref 22–29)
CO2 SERPL-SCNC: 21.2 MMOL/L (ref 22–29)
CO2 SERPL-SCNC: 22.7 MMOL/L (ref 22–29)
CREAT SERPL-MCNC: 1.61 MG/DL (ref 0.76–1.27)
CREAT SERPL-MCNC: 1.78 MG/DL (ref 0.76–1.27)
CREAT SERPL-MCNC: 1.83 MG/DL (ref 0.76–1.27)
DEPRECATED RDW RBC AUTO: 43.1 FL (ref 37–54)
EGFRCR SERPLBLD CKD-EPI 2021: 38 ML/MIN/1.73
EGFRCR SERPLBLD CKD-EPI 2021: 39.3 ML/MIN/1.73
EGFRCR SERPLBLD CKD-EPI 2021: 44.3 ML/MIN/1.73
EOSINOPHIL # BLD AUTO: 0.13 10*3/MM3 (ref 0–0.4)
EOSINOPHIL NFR BLD AUTO: 1.3 % (ref 0.3–6.2)
ERYTHROCYTE [DISTWIDTH] IN BLOOD BY AUTOMATED COUNT: 12.7 % (ref 12.3–15.4)
GLUCOSE BLDC GLUCOMTR-MCNC: 144 MG/DL (ref 70–130)
GLUCOSE BLDC GLUCOMTR-MCNC: 62 MG/DL (ref 70–130)
GLUCOSE BLDC GLUCOMTR-MCNC: 77 MG/DL (ref 70–130)
GLUCOSE BLDC GLUCOMTR-MCNC: 77 MG/DL (ref 70–130)
GLUCOSE BLDC GLUCOMTR-MCNC: 82 MG/DL (ref 70–130)
GLUCOSE BLDC GLUCOMTR-MCNC: 82 MG/DL (ref 70–130)
GLUCOSE BLDC GLUCOMTR-MCNC: 86 MG/DL (ref 70–130)
GLUCOSE SERPL-MCNC: 76 MG/DL (ref 65–99)
GLUCOSE SERPL-MCNC: 78 MG/DL (ref 65–99)
GLUCOSE SERPL-MCNC: 79 MG/DL (ref 65–99)
HCT VFR BLD AUTO: 38.5 % (ref 37.5–51)
HGB BLD-MCNC: 12.9 G/DL (ref 13–17.7)
IMM GRANULOCYTES # BLD AUTO: 0.05 10*3/MM3 (ref 0–0.05)
IMM GRANULOCYTES NFR BLD AUTO: 0.5 % (ref 0–0.5)
LYMPHOCYTES # BLD AUTO: 1.22 10*3/MM3 (ref 0.7–3.1)
LYMPHOCYTES NFR BLD AUTO: 11.9 % (ref 19.6–45.3)
MCH RBC QN AUTO: 31.3 PG (ref 26.6–33)
MCHC RBC AUTO-ENTMCNC: 33.5 G/DL (ref 31.5–35.7)
MCV RBC AUTO: 93.4 FL (ref 79–97)
MONOCYTES # BLD AUTO: 0.76 10*3/MM3 (ref 0.1–0.9)
MONOCYTES NFR BLD AUTO: 7.4 % (ref 5–12)
NEUTROPHILS NFR BLD AUTO: 78.6 % (ref 42.7–76)
NEUTROPHILS NFR BLD AUTO: 8.08 10*3/MM3 (ref 1.7–7)
NRBC BLD AUTO-RTO: 0 /100 WBC (ref 0–0.2)
PLATELET # BLD AUTO: 261 10*3/MM3 (ref 140–450)
PMV BLD AUTO: 10.6 FL (ref 6–12)
POTASSIUM SERPL-SCNC: 3.9 MMOL/L (ref 3.5–5.2)
POTASSIUM SERPL-SCNC: 4 MMOL/L (ref 3.5–5.2)
POTASSIUM SERPL-SCNC: 4.1 MMOL/L (ref 3.5–5.2)
QT INTERVAL: 449 MS
QTC INTERVAL: 478 MS
RBC # BLD AUTO: 4.12 10*6/MM3 (ref 4.14–5.8)
SODIUM SERPL-SCNC: 140 MMOL/L (ref 136–145)
SODIUM SERPL-SCNC: 141 MMOL/L (ref 136–145)
SODIUM SERPL-SCNC: 142 MMOL/L (ref 136–145)
WBC NRBC COR # BLD AUTO: 10.27 10*3/MM3 (ref 3.4–10.8)

## 2024-06-06 PROCEDURE — 93005 ELECTROCARDIOGRAM TRACING: CPT | Performed by: NURSE PRACTITIONER

## 2024-06-06 PROCEDURE — 80048 BASIC METABOLIC PNL TOTAL CA: CPT

## 2024-06-06 PROCEDURE — 63710000001 INSULIN GLARGINE PER 5 UNITS: Performed by: HOSPITALIST

## 2024-06-06 PROCEDURE — 93010 ELECTROCARDIOGRAM REPORT: CPT | Performed by: STUDENT IN AN ORGANIZED HEALTH CARE EDUCATION/TRAINING PROGRAM

## 2024-06-06 PROCEDURE — 25010000002 CEFEPIME PER 500 MG: Performed by: INTERNAL MEDICINE

## 2024-06-06 PROCEDURE — 93005 ELECTROCARDIOGRAM TRACING: CPT | Performed by: INTERNAL MEDICINE

## 2024-06-06 PROCEDURE — 93010 ELECTROCARDIOGRAM REPORT: CPT | Performed by: INTERNAL MEDICINE

## 2024-06-06 PROCEDURE — 82948 REAGENT STRIP/BLOOD GLUCOSE: CPT

## 2024-06-06 PROCEDURE — 99232 SBSQ HOSP IP/OBS MODERATE 35: CPT | Performed by: INTERNAL MEDICINE

## 2024-06-06 PROCEDURE — 80048 BASIC METABOLIC PNL TOTAL CA: CPT | Performed by: INTERNAL MEDICINE

## 2024-06-06 PROCEDURE — 85025 COMPLETE CBC W/AUTO DIFF WBC: CPT | Performed by: HOSPITALIST

## 2024-06-06 RX ORDER — BUMETANIDE 0.5 MG/1
0.5 TABLET ORAL DAILY
Status: DISCONTINUED | OUTPATIENT
Start: 2024-06-07 | End: 2024-06-08

## 2024-06-06 RX ADMIN — CEFEPIME 2000 MG: 2 INJECTION, POWDER, FOR SOLUTION INTRAVENOUS at 23:08

## 2024-06-06 RX ADMIN — DEXTROSE MONOHYDRATE 25 G: 25 INJECTION, SOLUTION INTRAVENOUS at 23:25

## 2024-06-06 RX ADMIN — ACETAMINOPHEN 325MG 650 MG: 325 TABLET ORAL at 18:12

## 2024-06-06 RX ADMIN — INSULIN GLARGINE 20 UNITS: 100 INJECTION, SOLUTION SUBCUTANEOUS at 09:09

## 2024-06-06 RX ADMIN — Medication 10 ML: at 09:13

## 2024-06-06 RX ADMIN — BRIMONIDINE TARTRATE 1 DROP: 2 SOLUTION OPHTHALMIC at 09:12

## 2024-06-06 RX ADMIN — CEFEPIME 2000 MG: 2 INJECTION, POWDER, FOR SOLUTION INTRAVENOUS at 12:03

## 2024-06-06 RX ADMIN — BRIMONIDINE TARTRATE 1 DROP: 2 SOLUTION OPHTHALMIC at 21:04

## 2024-06-06 RX ADMIN — HYDRALAZINE HYDROCHLORIDE 25 MG: 25 TABLET ORAL at 09:09

## 2024-06-06 RX ADMIN — Medication 10 ML: at 21:04

## 2024-06-06 RX ADMIN — TRAMADOL HYDROCHLORIDE 50 MG: 50 TABLET ORAL at 09:09

## 2024-06-06 RX ADMIN — HYDRALAZINE HYDROCHLORIDE 25 MG: 25 TABLET ORAL at 13:52

## 2024-06-06 RX ADMIN — BUMETANIDE 1 MG: 1 TABLET ORAL at 09:08

## 2024-06-06 RX ADMIN — AMIODARONE HYDROCHLORIDE 200 MG: 200 TABLET ORAL at 09:09

## 2024-06-06 RX ADMIN — LATANOPROST 1 DROP: 50 SOLUTION OPHTHALMIC at 21:04

## 2024-06-06 RX ADMIN — METOPROLOL TARTRATE 12.5 MG: 25 TABLET, FILM COATED ORAL at 09:12

## 2024-06-06 RX ADMIN — APIXABAN 5 MG: 5 TABLET, FILM COATED ORAL at 09:09

## 2024-06-06 NOTE — PLAN OF CARE
Goal Outcome Evaluation:         Outcome Evaluation: pt alert and oriented to self and place only this shift. pt confused, agitated, and combative although easily reoriented. PRN tramadol given. Q2 turns with assist X2. Up to use urinal with max assist x2 and gait belt. Educated pt wife on pt condition and benefits for continuing PT at SNF, wife refused. possible d/c tomorrow. VSS. safety maintained.

## 2024-06-06 NOTE — PROGRESS NOTES
Hospital Follow Up    LOS: 7  Patient Name: Dilip Verma  Age/Sex: 74 y.o. male  : 1950  MRN: [id number]    Day of Service: 24   Length of Stay: 7  Encounter Provider: Payam Larry MD  Place of Service: [facility]  Patient Care Team:  Fernando Camargo DO as PCP - General (Family Medicine)  Morris Cai MD as Consulting Physician (Sleep Medicine)  Michaela Hylton MD as Consulting Physician (Cardiology)  Hardy Banerjee MD as Consulting Physician (Ophthalmology)  Chula Christianson MD as Consulting Physician (Ophthalmology)  Jason Sherman MD (Endocrinology)  Perla Mayen MD as Consulting Physician (Nephrology)  Federico Hebert MD as Consulting Physician (Pulmonary Disease)  Niraj Ravi MD as Consulting Physician (Orthopedic Surgery)  Papa Velasco MD as Consulting Physician (Urology)  Terese Yost MD as Consulting Physician (Gastroenterology)  Aracelis Ball MD as Consulting Physician (Colon and Rectal Surgery)  [facility] as Primary Care Provider    Subjective:     Chief Complaint: Atrial fibrillation    Interval History: Patient was very confused today.    Objective:     Objective:  Temp:  [98.2 °F (36.8 °C)-98.6 °F (37 °C)] 98.2 °F (36.8 °C)  Heart Rate:  [56-86] 86  Resp:  [16] 16  BP: (101-148)/(69-92) 148/92   No intake or output data in the 24 hours ending 24 0947  Body mass index is 31.02 kg/m².      24  0350 24  1103 24  0819   Weight: 86.6 kg (191 lb) 92.9 kg (204 lb 12.9 oz) 92.5 kg (204 lb)     Weight change:       Physical Exam:   General :  Alert, cooperative, in no acute distress.  Neuro:   Alert,cooperative and oriented.  Lungs:  CTAB. Normal respiratory effort and rate.  CV: regular rate and rhythm, normal S1 and S2, no murmurs, gallops or rubs.   ABD:  Soft, nontender, nondistended. Positive bowel sounds.  Extr:  No edema or cyanosis, moves all  "extremities.    Lab Review:   Results from last 7 days   Lab Units 06/06/24  0826 06/06/24  0620 06/01/24  0551 05/31/24  0620   SODIUM mmol/L 141 140   < > 137   POTASSIUM mmol/L 4.0 4.1   < > 4.4   CHLORIDE mmol/L 106 106   < > 102   CO2 mmol/L 22.7 21.2*   < > 27.0   BUN mg/dL 28* 27*   < > 32*   CREATININE mg/dL 1.83* 1.78*   < > 1.51*   GLUCOSE mg/dL 76 79   < > 97   CALCIUM mg/dL 8.7 8.6   < > 8.8   AST (SGOT) U/L  --   --   --  30   ALT (SGPT) U/L  --   --   --  18    < > = values in this interval not displayed.         Results from last 7 days   Lab Units 06/06/24  0620 06/03/24  0608   WBC 10*3/mm3 10.27 7.71   HEMOGLOBIN g/dL 12.9* 13.1   HEMATOCRIT % 38.5 39.7   PLATELETS 10*3/mm3 261 195         Results from last 7 days   Lab Units 06/04/24  0741 06/03/24  0608   MAGNESIUM mg/dL 2.0 2.0           Invalid input(s): \"LDLCALC\"      Results from last 7 days   Lab Units 06/04/24  1006 05/31/24  0620   TSH uIU/mL 2.020 1.250       Current Medications:   Scheduled Meds:amiodarone, 200 mg, Oral, Q24H  apixaban, 5 mg, Oral, Q12H  aspirin, 81 mg, Oral, Daily  brimonidine, 1 drop, Both Eyes, BID  bumetanide, 1 mg, Oral, Daily  cefepime, 2,000 mg, Intravenous, Q12H  DULoxetine, 30 mg, Oral, Daily  hydrALAZINE, 25 mg, Oral, Q8H  insulin glargine, 20 Units, Subcutaneous, Daily  insulin lispro, 2-9 Units, Subcutaneous, 4x Daily AC & at Bedtime  latanoprost, 1 drop, Both Eyes, Nightly  metoprolol tartrate, 12.5 mg, Oral, Q12H  OLANZapine, 5 mg, Oral, Nightly  senna-docusate sodium, 2 tablet, Oral, BID  sodium chloride, 10 mL, Intravenous, Q12H  terazosin, 1 mg, Oral, Nightly      Continuous Infusions:     Allergies:  Allergies   Allergen Reactions    Ace Inhibitors Angioedema    Angiotensin Receptor Blockers Angioedema and Unknown (See Comments)     Angioneurotic edema    Dapagliflozin Other (See Comments)     UTI,  rectal abcess    Losartan Angioedema     Angioneurotic edema    Seroquel [Quetiapine] Hallucinations    " Xanax [Alprazolam] Unknown - High Severity    Amlodipine Swelling    Ativan [Lorazepam] Hallucinations    Baclofen Hallucinations    Misc. Sulfonamide Containing Compounds Unknown (See Comments)    Minoxidil Confusion    Tetracycline Rash     bliisters in mouth         Assessment:       Acute UTI (urinary tract infection)    Type 2 diabetes mellitus with hyperglycemia, with long-term current use of insulin    CAD (coronary artery disease)    Hx of CABG    Chronic diastolic heart failure    CSA (central sleep apnea)    HTN (hypertension)    History of stroke    CKD (chronic kidney disease) stage 3, GFR 30-59 ml/min    Peripheral arterial disease    Sepsis    Hyperkalemia    Metabolic encephalopathy    AMENA (acute kidney injury)        Plan:     1.  New onset atrial fibrillation.  Patient remains in normal sinus rhythm.  ECG done this morning shows a normal rhythm with a QTc interval of 478 in the setting of a right bundle branch block.  Continue current medical regimen.  2.  Urosepsis getting better  3.  Left lower extremity cellulitis  4.  Coronary artery disease stable  5.  At this point continue the same.  Patient remains very confused when I saw him.    Payam Larry MD  06/06/24  09:47 EDT

## 2024-06-06 NOTE — SIGNIFICANT NOTE
06/06/24 1514   OTHER   Discipline physical therapist   Rehab Time/Intention   Session Not Performed other (see comments)  (spoke with RN who states stat EKG ordered, pt not appropriate for PT right now. will check back tomorrow)   Recommendation   PT - Next Appointment 06/07/24

## 2024-06-06 NOTE — PROGRESS NOTES
Name: Dilip Verma ADMIT: 2024   : 1950  PCP: Fernando Camargo DO    MRN: [id number] LOS: 7 days   AGE/SEX: 74 y.o. male  ROOM: [facility]     Subjective   Subjective   No acute events. Patient had some agitation this AM which has since resolved. He has no new complaints. His wife is at bedside.    Objective   Objective   Vital Signs  Temp:  [97.7 °F (36.5 °C)-98.6 °F (37 °C)] 97.7 °F (36.5 °C)  Heart Rate:  [62-86] 76  Resp:  [16-18] 18  BP: (101-148)/(70-92) 128/79  SpO2:  [91 %-92 %] 91 %  on  Flow (L/min):  [2-4] 2;   Device (Oxygen Therapy): nasal cannula  Body mass index is 31.02 kg/m².  Physical Exam  Vitals and nursing note reviewed.   Constitutional:       General: He is not in acute distress.     Appearance: He is ill-appearing (chronically). He is not toxic-appearing or diaphoretic.   HENT:      Head: Normocephalic and atraumatic.      Nose: Nose normal.      Mouth/Throat:      Mouth: Mucous membranes are moist.      Pharynx: Oropharynx is clear.   Eyes:      Conjunctiva/sclera: Conjunctivae normal.      Pupils: Pupils are equal, round, and reactive to light.   Cardiovascular:      Rate and Rhythm: Normal rate and regular rhythm.      Pulses: Normal pulses.   Pulmonary:      Effort: Pulmonary effort is normal.      Breath sounds: Normal breath sounds.   Abdominal:      General: Bowel sounds are normal.      Palpations: Abdomen is soft.      Tenderness: There is no abdominal tenderness.   Musculoskeletal:         General: Swelling (1-2+ BLE) present.      Cervical back: Neck supple.   Skin:     General: Skin is warm and dry.   Neurological:      Mental Status: He is alert.       Results Review     I reviewed the patient's new clinical results.  Results from last 7 days   Lab Units 24  0620 24  0608 24  0752 24  0551   WBC 10*3/mm3 10.27 7.71 7.94 8.26   HEMOGLOBIN g/dL 12.9* 13.1 13.0 13.2   PLATELETS 10*3/mm3 261 195 204 168     Results from last 7 days   Lab Units  06/06/24  0826 06/06/24  0620 06/05/24  0654 06/04/24  0741   SODIUM mmol/L 141 140 136 140   POTASSIUM mmol/L 4.0 4.1 3.9 4.1   CHLORIDE mmol/L 106 106 102 106   CO2 mmol/L 22.7 21.2* 22.1 25.0   BUN mg/dL 28* 27* 24* 17   CREATININE mg/dL 1.83* 1.78* 1.45* 1.29*   GLUCOSE mg/dL 76 79 108* 129*   EGFR mL/min/1.73 38.0* 39.3* 50.3* 57.8*     Results from last 7 days   Lab Units 06/03/24  0608 06/02/24  0752 05/31/24  0620   ALBUMIN g/dL 3.2* 3.2* 3.3*   BILIRUBIN mg/dL  --   --  0.6   ALK PHOS U/L  --   --  60   AST (SGOT) U/L  --   --  30   ALT (SGPT) U/L  --   --  18     Results from last 7 days   Lab Units 06/06/24  0826 06/06/24  0620 06/05/24  0654 06/04/24  0741 06/03/24  0608 06/02/24  0752 06/01/24  0551 05/31/24  0620   CALCIUM mg/dL 8.7 8.6 8.3* 8.3* 8.6 8.5*   < > 8.8   ALBUMIN g/dL  --   --   --   --  3.2* 3.2*  --  3.3*   MAGNESIUM mg/dL  --   --   --  2.0 2.0 2.1  --   --    PHOSPHORUS mg/dL  --   --   --   --  3.1 2.4*  --   --     < > = values in this interval not displayed.       Glucose   Date/Time Value Ref Range Status   06/06/2024 1014 77 70 - 130 mg/dL Final   06/06/2024 0923 77 70 - 130 mg/dL Final   06/06/2024 0608 82 70 - 130 mg/dL Final   06/05/2024 2053 96 70 - 130 mg/dL Final   06/05/2024 1615 94 70 - 130 mg/dL Final   06/05/2024 1045 160 (H) 70 - 130 mg/dL Final   06/05/2024 0607 105 70 - 130 mg/dL Final       No radiology results for the last day    I have personally reviewed all medications:  Scheduled Medications  amiodarone, 200 mg, Oral, Q24H  apixaban, 5 mg, Oral, Q12H  aspirin, 81 mg, Oral, Daily  brimonidine, 1 drop, Both Eyes, BID  [START ON 6/7/2024] bumetanide, 0.5 mg, Oral, Daily  cefepime, 2,000 mg, Intravenous, Q12H  DULoxetine, 30 mg, Oral, Daily  hydrALAZINE, 25 mg, Oral, Q8H  insulin glargine, 20 Units, Subcutaneous, Daily  insulin lispro, 2-9 Units, Subcutaneous, 4x Daily AC & at Bedtime  latanoprost, 1 drop, Both Eyes, Nightly  metoprolol tartrate, 12.5 mg, Oral,  Q12H  OLANZapine, 5 mg, Oral, Nightly  senna-docusate sodium, 2 tablet, Oral, BID  sodium chloride, 10 mL, Intravenous, Q12H  terazosin, 1 mg, Oral, Nightly    Infusions   Diet  Diet: Regular/House, Diabetic, Cardiac; Healthy Heart (2-3 Na+); Consistent Carbohydrate; Texture: Regular (IDDSI 7); Fluid Consistency: Thin (IDDSI 0)    I have personally reviewed:  [x]  Laboratory   [x]  Microbiology   [x]  Radiology   [x]  EKG/Telemetry  [x]  Cardiology/Vascular   []  Pathology    [x]  Records    Assessment/Plan     Active Hospital Problems    Diagnosis  POA    **Acute UTI (urinary tract infection) [N39.0]  Yes    Paroxysmal atrial fibrillation [I48.0]  Yes    AMENA (acute kidney injury) [N17.9]  Yes    Sepsis [A41.9]  Yes    Hyperkalemia [E87.5]  Yes    Metabolic encephalopathy [G93.41]  Yes    Peripheral arterial disease [I73.9]  Yes    History of stroke [Z86.73]  Not Applicable    CKD (chronic kidney disease) stage 3, GFR 30-59 ml/min [N18.30]  Yes    HTN (hypertension) [I10]  Yes    CSA (central sleep apnea) [G47.31]  Yes    Chronic diastolic heart failure [I50.32]  Yes    Hx of CABG [Z95.1]  Not Applicable    CAD (coronary artery disease) [I25.10]  Yes    Type 2 diabetes mellitus with hyperglycemia, with long-term current use of insulin [E11.65, Z79.4]  Not Applicable      Resolved Hospital Problems   No resolved problems to display.   Acute UTI/LLE Cellulitis  - urine culture growing Serratia  - switch to augmentin and ciprofloxacin at discharge to complete a 10d course  - appreciate ID recs    HTNCAD/PAD/New Onset Afib (PAF)/Chronic Diastolic CHF  - BP acceptable, no anginal symptoms, in NSR  - continue on current regimen  - appreciate cardiology recs    AMENA  - likely pre-renal with ATN  - overall stable  - continue on bumex  - appreciate nephrology recs      Eliquis (home med) for DVT prophylaxis.  Full code.  Discussed with patient, spouse, and nursing staff.  Anticipate discharge home with family  tomorrow.  Expected Discharge Date: 6/7/2024; Expected Discharge Time:       Darci Westbrook MD  [facility]  06/06/24  17:32 EDT

## 2024-06-06 NOTE — SIGNIFICANT NOTE
spoke with RN who states stat EKG ordered, pt not appropriate for OT right now. will check back tomorrow

## 2024-06-06 NOTE — PROGRESS NOTES
"  Infectious Diseases Progress Note    Tino Nagel MD     [facility]  Los: 7 days  Patient Identification:  Name: Dilip Verma  Age: 74 y.o.  Sex: male  :  1950  MRN: [id number]         Primary Care Physician: Fernando Camargo, DO        Subjective: Not as confused and feeling better.  Still have some burning in urination but much improved.  Interval History: See consultation note.  2024 MRSA screen is negative.  Objective:    Scheduled Meds:amiodarone, 200 mg, Oral, Q24H  apixaban, 5 mg, Oral, Q12H  aspirin, 81 mg, Oral, Daily  brimonidine, 1 drop, Both Eyes, BID  bumetanide, 1 mg, Oral, Daily  cefepime, 2,000 mg, Intravenous, Q12H  DULoxetine, 30 mg, Oral, Daily  hydrALAZINE, 25 mg, Oral, Q8H  insulin glargine, 20 Units, Subcutaneous, Daily  insulin lispro, 2-9 Units, Subcutaneous, 4x Daily AC & at Bedtime  latanoprost, 1 drop, Both Eyes, Nightly  metoprolol tartrate, 12.5 mg, Oral, Q12H  OLANZapine, 5 mg, Oral, Nightly  senna-docusate sodium, 2 tablet, Oral, BID  sodium chloride, 10 mL, Intravenous, Q12H  terazosin, 1 mg, Oral, Nightly      Continuous Infusions:       Vital signs in last 24 hours:  Temp:  [98.2 °F (36.8 °C)-98.6 °F (37 °C)] 98.4 °F (36.9 °C)  Heart Rate:  [56-71] 62  Resp:  [16-23] 16  BP: (101-138)/(69-89) 101/86    Intake/Output:  No intake or output data in the 24 hours ending 24 0616      Exam:  /86 (BP Location: Right arm, Patient Position: Lying)   Pulse 62   Temp 98.4 °F (36.9 °C) (Oral)   Resp 16   Ht 172.7 cm (68\")   Wt 92.5 kg (204 lb)   SpO2 92%   BMI 31.02 kg/m²   Patient is examined using the personal protective equipment as per guidelines from infection control for this particular patient as enacted.  Hand washing was performed before and after patient interaction.  General Appearance: Awake and interactive and currently lying in an awkward position in the bed with left leg dangling at the left side of the bed and he is unable to " position left leg properly and elevated.  He did admit that all night his left leg has been dangling this way.                          Head:    Normocephalic, without obvious abnormality, atraumatic                           Eyes:    PERRL, conjunctivae/corneas clear, EOM's intact, both eyes                         Throat:   Lips, tongue, gums normal; oral mucosa pink and moist                           Neck:   Supple, symmetrical, trachea midline, no JVD                         Lungs:    Clear to auscultation bilaterally, respirations unlabored                 Chest Wall:    No tenderness or deformity                          Heart:  S1-S2 regular                  Abdomen:   Soft nontender                 Extremities: left lower extremity is wrapped with residual edema of the left foot                        Pulses:   Pulses palpable in all extremities                            Skin:   Skin is warm and dry,  no rashes or palpable lesions                  Neurologic: More appropriate not as confused interactive and appears to be in no acute distress       Data Review:    I reviewed the patient's new clinical results.  Results from last 7 days   Lab Units 06/03/24  0608 06/02/24  0752 06/01/24  0551 05/31/24  0620   WBC 10*3/mm3 7.71 7.94 8.26 14.38*   HEMOGLOBIN g/dL 13.1 13.0 13.2 13.4   PLATELETS 10*3/mm3 195 204 168 184     Results from last 7 days   Lab Units 06/05/24  0654 06/04/24  0741 06/03/24  0608 06/02/24  0752 06/01/24  0551 05/31/24  0620 05/30/24  0955   SODIUM mmol/L 136 140 140 141 138 137 140   POTASSIUM mmol/L 3.9 4.1 3.9 4.2 4.5 4.4 4.0   CHLORIDE mmol/L 102 106 105 107 106 102 107   CO2 mmol/L 22.1 25.0 23.1 24.0 23.4 27.0 24.6   BUN mg/dL 24* 17 17 23 31* 32* 22   CREATININE mg/dL 1.45* 1.29* 1.07 0.94 1.22 1.51* 0.90   CALCIUM mg/dL 8.3* 8.3* 8.6 8.5* 8.3* 8.8 7.3*   GLUCOSE mg/dL 108* 129* 105* 74 166* 97 228*     Microbiology Results (last 10 days)       Procedure Component Value -  Date/Time    MRSA Screen, PCR (Inpatient) - Swab, Nares [[id number]]  (Normal) Collected: 06/04/24 0952    Lab Status: Final result Specimen: Swab from Nares Updated: 06/04/24 1112     MRSA PCR No MRSA Detected    Narrative:      The negative predictive value of this diagnostic test is high and should only be used to consider de-escalating anti-MRSA therapy. A positive result may indicate colonization with MRSA and must be correlated clinically.    Eosinophil Smear - Urine, Urine, Clean Catch [[id number]]  (Normal) Collected: 05/31/24 1614    Lab Status: Final result Specimen: Urine, Clean Catch Updated: 05/31/24 1745     Eosinophil Smear 0 % EOS/100 Cells     Urine Culture - Urine, Urine, Clean Catch [[id number]]  (Abnormal)  (Susceptibility) Collected: 05/30/24 0538    Lab Status: Final result Specimen: Urine, Clean Catch Updated: 06/02/24 0935     Urine Culture 50,000 CFU/mL Serratia marcescens    Narrative:      Colonization of the urinary tract without infection is common. Treatment is discouraged unless the patient is symptomatic, pregnant, or undergoing an invasive urologic procedure.    Susceptibility        Serratia marcescens      KAT Not Specified      Amoxicillin + Clavulanate Resistant       Cefazolin Resistant       Cefepime Susceptible       Ceftazidime Susceptible       Ceftriaxone Susceptible       Gentamicin Susceptible       Levofloxacin Susceptible       Nitrofurantoin Resistant       Piperacillin + Tazobactam  Susceptible      Trimethoprim + Sulfamethoxazole Susceptible                          Susceptibility Comments       Serratia marcescens    Pip/patsy confirmed by disk diffusion.               Blood Culture - Blood, Arm, Left [[id number]]  (Normal) Collected: 05/30/24 0441    Lab Status: Final result Specimen: Blood from Arm, Left Updated: 06/04/24 0500     Blood Culture No growth at 5 days    Narrative:      Less than seven (7) mL's of blood was collected.  Insufficient quantity may yield false  negative results.    Blood Culture - Blood, Arm, Left [[id number]]  (Normal) Collected: 05/30/24 043    Lab Status: Final result Specimen: Blood from Arm, Left Updated: 06/04/24 0445     Blood Culture No growth at 5 days            Assessment:    Acute UTI (urinary tract infection)    Type 2 diabetes mellitus with hyperglycemia, with long-term current use of insulin    CAD (coronary artery disease)    Hx of CABG    Chronic diastolic heart failure    CSA (central sleep apnea)    HTN (hypertension)    History of stroke    CKD (chronic kidney disease) stage 3, GFR 30-59 ml/min    Peripheral arterial disease    Sepsis    Hyperkalemia    Metabolic encephalopathy    AMENA (acute kidney injury)  1-metabolic encephalopathy due to  2-systemic infection as a result of urinary tract infection as well as evolving sepsis traumatic cellulitis of the left leg.  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     Recommendations/Discussions:  He is doing very well in terms of his symptoms such as dysuria and discomfort in his left leg.  His mental status is also significantly improved.  Vancomycin discontinued on 6/5/2024.  Continue with IV cefepime while he is here to address both traumatic cellulitis as well as Serratia marcescens UTI and when considered stable and improved can be switched to combination of ciprofloxacin and Augmentin to complete the 10-day course of treatment.  Bactrim is not an option given the renal insufficiency.  Side effects of antibiotic therapy that could occur discussed with the patient's wife at the bedside.  Patient would need continued elevation and wrapping of left lower extremity until edema is resolved and may benefit from  out of hospital wound care follow-ups if concern for secondary infection of the wound still persists.  Tino Nagel MD  6/6/2024  06:16 EDT    Parts of this note may be an electronic transcription/translation of spoken language to printed text using the Dragon dictation system.

## 2024-06-06 NOTE — PROGRESS NOTES
PROGRESS NOTE      Patient Name: Dilip Verma  : 1950  MRN: [id number]  Primary Care Physician: Fernando Camargo DO  Date of admission: 2024    Patient Care Team:  Fernando Camargo DO as PCP - General (Family Medicine)  Morris Cai MD as Consulting Physician (Sleep Medicine)  Michaela Hylton MD as Consulting Physician (Cardiology)  Hardy Banerjee MD as Consulting Physician (Ophthalmology)  Chula Christianson MD as Consulting Physician (Ophthalmology)  Jason Sherman MD (Endocrinology)  Perla Mayen MD as Consulting Physician (Nephrology)  Federico Hebert MD as Consulting Physician (Pulmonary Disease)  Niraj Ravi MD as Consulting Physician (Orthopedic Surgery)  Papa Velasco MD as Consulting Physician (Urology)  Terese Yost MD as Consulting Physician (Gastroenterology)  Aracelis Ball MD as Consulting Physician (Colon and Rectal Surgery)  [facility] as Primary Care Provider        Reason for Follow up:     AMENA, CKD III      Subjective:     Seen and examined, no new complaints    Review of systems:  Constitutional: Seen and examined, alert awake, no distress  HEENT:  No headache, otalgia, itchy eyes, nasal discharge or sore throat.  Cardiac:  No chest pain, dyspnea, orthopnea or PND.  Chest:              No cough, phlegm or wheezing.  Abdomen:  No abdominal pain, nausea or vomiting.  Neuro:  No focal weakness, abnormal movements orseizure like activity.  :   Dysuria  ROS was otherwise negative except as mentioned in the [place].       Personal History:     Past Medical History:   Past Medical History:   Diagnosis Date    AMENA (acute kidney injury) 2020    Alcoholism 1989    not since     CORI positive     Anemia     Anxiety     Ataxia     Atypical chest pain 2022    SEEN AT [facility] ER    Balance disorder     Carotid stenosis 3/28/2024    Cataract     BILATERAL, S/P EXTRACTION    Cervical radiculopathy 2019    SEEN AT  [facility]  "ER    Cervical spinal cord compression     Chronic diastolic (congestive) heart failure     Chronic kidney disease     STAGE 3, FOLLOWED BY DR. VIVAR    Chronic pancreatitis     Closed left subtrochanteric femur fracture 12/01/2020    ADMITTED TO [facility]    Closed nondisplaced intertrochanteric fracture of left femur 12/01/2020    ADMITTED TO [facility]    Colon polyps     FOLLOWED BY DR. AVTAR JEONG    Constipation     Contracture, right hand     Coronary artery disease     CABG 7/2019    COVID-19 07/2022    DDD (degenerative disc disease), cervical     Diabetes mellitus, type II     IDDM    Diabetic retinopathy     Difficulty walking     Dysphagia     Elevated brain natriuretic peptide (BNP) level 08/2014    Elevated LFTs 03/2021    Erectile dysfunction     Fissure, anal 2022    Foot drop     Fuchs' corneal dystrophy of right eye     Glaucoma     BILATERAL    History of alcohol abuse     Hyperlipidemia     Hypersomnia     Hypertension     Hypertensive urgency 02/25/2020    ADMITTED TO [facility]    Insomnia     Kidney stones     LV dysfunction 06/2016    Lyme disease     Lymphadenopathy syndrome 05/2021    Macular edema     BILATERAL    Myocardial infarction 11/05/2019    NSTEMI, ADMITTED TO [facility]    Myocardial infarction 07/12/2019    NSTEMI, ADMITTED TO [facility]    Neuropathy in diabetes     Non-celiac gluten sensitivity     Orthostasis     Osteoporosis     Oxygen dependent     PAD (peripheral artery disease)     PNA (pneumonia) 06/2016    LEFT LOBE    Polyneuropathy     PTSD (post-traumatic stress disorder)     Pulmonary hypertension     Pulmonary nodule     Senile ectropion of both lower eyelids 02/2022    Sepsis 12/16/2020    D/T UTI, ADMITTED TO [facility]    Sleep apnea     STATES DOESN'T USE BIPAP OR CPAP    Spinal stenosis in cervical region     SEVERE-LIMITED ROM    Stroke 2012    \"slight stroke\"    Syncope and collapse 07/06/2019    ADMITTED TO [facility]    Urinary retention 04/05/2021    SEEN AT [facility] ER    Vision loss     Vitamin D " deficiency        Surgical History:      Past Surgical History:   Procedure Laterality Date    CARDIAC CATHETERIZATION N/A 07/15/2019    Procedure: Coronary angiography;  Surgeon: Carrie Price MD;  Location:  [facility];  Service: Cardiovascular    CARDIAC CATHETERIZATION N/A 07/15/2019    Procedure: Left Heart Cath;  Surgeon: Carrie Price MD;  Location:  [facility];  Service: Cardiovascular    CARDIAC CATHETERIZATION N/A 07/15/2019    Procedure: Left ventriculography;  Surgeon: Carrie Price MD;  Location:  [facility];  Service: Cardiovascular    CARDIAC CATHETERIZATION  07/15/2019    Procedure: Functional Flow [place];  Surgeon: Carrie Price MD;  Location:  [facility];  Service: Cardiovascular    CARDIAC CATHETERIZATION N/A 11/06/2019    Procedure: Right and Left Heart Cath;  Surgeon: Mya Smith MD;  Location:  [facility];  Service: Cardiovascular    CARDIAC CATHETERIZATION N/A 11/06/2019    Procedure: Coronary angiography;  Surgeon: Mya Smith MD;  [facility];  Service: Cardiovascular    CARDIAC SURGERY      CATARACT EXTRACTION Left 2014    CATARACT EXTRACTION Right 11/2016    PHACO/IOL, DR. LEN DENTON    COLONOSCOPY N/A 03/16/2023    ENTIRE COLON WNL, RESCOPE IN 5 YRS, DR. LINDA LIZARRAGA AT [facility]    COLONOSCOPY W/ POLYPECTOMY N/A 01/02/2015    A FEW DIVERTICULA IN SIGMOID, 6 MM TUBULOVILLOUS ADENOMA POLYP IN RECTUM, SMALL HEMORRHOIDS, MELANOSIS COLI, DR. AVTAR JEONG AT [facility]    CORONARY ARTERY BYPASS GRAFT N/A 07/18/2019    Procedure: INTRAOPERATIVE SHOAIB; STERNOTOMY CORONARY ARTERY BYPASS x 3  USING LEFT INTERNAL MAMMARY ARTERY GRAFT UTILIZING ENDOSCOPICALLY HARVESTED RIGHT GREATER SAPHENOUS VEIN AND PRP.;  Surgeon: Bill Devi MD;  Location: [facility] MAIN OR;  Service: Cardiothoracic    CYSTOSCOPY BLADDER STONE LITHOTRIPSY N/A     DENTAL PROCEDURE  Bilateral     3 surgeries inder implants    FEMUR IM NAILING/RODDING Left 12/03/2020    Procedure: LEFT HIP INTRAMEDULLARY NAIL;  Surgeon: Niraj Ravi MD;  Location: [facility] OR;  Service: Orthopedic Spine;  Laterality: Left;    INCISION AND DRAINAGE PERIRECTAL ABSCESS N/A 10/04/2023    Procedure: Incision and drainage of perianal and buttock abscess;  Surgeon: Herbie Villatoro MD;  Location: [facility] OR;  Service: General;  Laterality: N/A;    INGUINAL HERNIA REPAIR Bilateral     TOENAIL EXCISION  06/2022    TONSILLECTOMY Bilateral     TOTAL HIP ARTHROPLASTY REVISION Left 01/11/2022    Procedure: TOTAL HIP ARTHROPLASTY REVISION- POSTERIOR;  Surgeon: Jurgen Candelaria II, MD;  Location: [facility] OR;  Service: Orthopedics;  Laterality: Left;    VASECTOMY N/A        Family History: family history includes Alcohol abuse in his brother and paternal grandfather; Arrhythmia in his mother; Cancer in his mother; Depression in his mother and sister; Glaucoma in his maternal grandmother; Heart attack in his paternal grandmother; Heart disease in his father and mother; Heart failure in his father; Hyperlipidemia in his father and mother; Hypertension in his father and mother; Kidney disease in his father; Lung disease in his paternal uncle; Mental illness in his mother; Migraines in his mother; Stroke in his maternal grandmother and paternal grandmother; Thyroid disease in his father and paternal uncle; Uterine cancer in his mother. Otherwise pertinent FHx was reviewed and unremarkable.     Social History:  reports that he quit smoking about 24 years ago. His smoking use included cigarettes. He started smoking about 40 years ago. He has a 12 pack-year smoking history. He has been exposed to tobacco smoke. He has never used smokeless tobacco. He reports that he does not currently use alcohol. He reports that he does not use drugs.    Medications:  Prior to Admission medications    Medication Sig Start  Date End Date Taking? Authorizing Provider   aspirin 81 MG EC tablet Take 1 tablet by mouth Every Night.   Yes Heri Rinaldi MD   brimonidine (ALPHAGAN) 0.2 % ophthalmic solution Administer 1 drop to both eyes 2 (Two) Times a Day.   Yes Heri Rinaldi MD   bumetanide (BUMEX) 1 MG tablet Take 1 tablet by mouth Daily.   Yes Heri Rinaldi MD   Cholecalciferol (Vitamin D-3) 125 MCG (5000 UT) tablet Take 1 tablet by mouth Daily.   Yes Heri Rinaldi MD   DULoxetine (CYMBALTA) 30 MG capsule Take 1 capsule by mouth Every Night. 1/16/24  Yes Heri Rinaldi MD   Insulin Glargine, 2 Unit Dial, (TOUJEO) 300 UNIT/ML solution pen-injector injection Inject 24 Units under the skin into the appropriate area as directed Daily.   Yes Heri Rinaldi MD   insulin lispro (humaLOG) 100 UNIT/ML injection Inject 0-14 Units under the skin into the appropriate area as directed 3 (Three) Times a Day Before Meals.  Patient taking differently: Inject 0-14 Units under the skin into the appropriate area as directed 3 (Three) Times a Day Before Meals. SLIDING SCALE  100-150 - 4 units  151-200 - 5 units  201-250 - 6 units  251-300 - 7 units  301-350 - 8 units  351-400 - 9 units  401+ - 10 units 12/7/20  Yes Amador Valverde MD   latanoprost (XALATAN) 0.005 % ophthalmic solution Administer 1 drop to both eyes every night at bedtime. 1/16/23  Yes Heri Rinaldi MD   multivitamin with minerals (MULTIVITAMIN ADULTS PO) Take 1 tablet by mouth Daily.   Yes Heri Rinaldi MD   testosterone (ANDROGEL) 25 MG/2.5GM (1%) gel gel Place 25 mg on the skin as directed by provider 2 (Two) Times a Week.   Yes Heri Rinaldi MD   traMADol (ULTRAM) 50 MG tablet Take 1 tablet by mouth At Night As Needed. 10/16/23  Yes Heri Rinaldi MD   Magnesium 400 MG capsule Take 400 mg by mouth As Needed.    Heri Rinaldi MD   sildenafil (REVATIO) 20 MG tablet Take 1 tablet by mouth As Needed.     Provider, MD Heri     Scheduled Meds:amiodarone, 200 mg, Oral, Q24H  apixaban, 5 mg, Oral, Q12H  aspirin, 81 mg, Oral, Daily  brimonidine, 1 drop, Both Eyes, BID  bumetanide, 1 mg, Oral, Daily  cefepime, 2,000 mg, Intravenous, Q12H  DULoxetine, 30 mg, Oral, Daily  hydrALAZINE, 25 mg, Oral, Q8H  insulin glargine, 20 Units, Subcutaneous, Daily  insulin lispro, 2-9 Units, Subcutaneous, 4x Daily AC & at Bedtime  latanoprost, 1 drop, Both Eyes, Nightly  metoprolol tartrate, 12.5 mg, Oral, Q12H  OLANZapine, 5 mg, Oral, Nightly  senna-docusate sodium, 2 tablet, Oral, BID  sodium chloride, 10 mL, Intravenous, Q12H  terazosin, 1 mg, Oral, Nightly      Continuous Infusions:     PRN Meds:  acetaminophen **OR** acetaminophen **OR** acetaminophen    senna-docusate sodium **AND** polyethylene glycol **AND** bisacodyl **AND** bisacodyl    dextrose    dextrose    glucagon (human recombinant)    nitroglycerin    OLANZapine    ondansetron    sodium chloride    sodium chloride    traMADol  Allergies:    Allergies   Allergen Reactions    Ace Inhibitors Angioedema    Angiotensin Receptor Blockers Angioedema and Unknown (See Comments)     Angioneurotic edema    Dapagliflozin Other (See Comments)     UTI,  rectal abcess    Losartan Angioedema     Angioneurotic edema    Seroquel [Quetiapine] Hallucinations    Xanax [Alprazolam] Unknown - High Severity    Amlodipine Swelling    Ativan [Lorazepam] Hallucinations    Baclofen Hallucinations    Misc. Sulfonamide Containing Compounds Unknown (See Comments)    Minoxidil Confusion    Tetracycline Rash     bliisters in mouth         Objective   Exam:     Vital Signs  Temp:  [98.2 °F (36.8 °C)-98.6 °F (37 °C)] 98.2 °F (36.8 °C)  Heart Rate:  [57-86] 86  Resp:  [16] 16  BP: (101-148)/(70-92) 148/92  SpO2:  [92 %-95 %] 92 %  on  Flow (L/min):  [2-4] 2;   Device (Oxygen Therapy): nasal cannula  Body mass index is 31.02 kg/m².  EXAM  General:  Chronically ill appearing male in no acute distress.     Head:      Normocephalic and atraumatic.    Eyes:      PERRL/EOM intact, conjunctivae and sclerae clear without nystagmus.    Neck:      No masses, thyromegaly,  trachea central   Lungs:    Clear bilaterally to auscultation.    Heart:      Regular rate and rhythm, no murmur no gallop  Abd:        Soft, nontender, not distended, bowel sounds positive, no shifting dullness.  Msk:        No deformity or scoliosis noted of thoracic or lumbar spine.    Pulses:   Pulses normal in all 4 extremities.    Extremities:        No cyanosis or clubbing- no edema.    Neuro:    No focal deficits.   alert oriented x3  Skin:       Intact without lesions or rashes.    Psych:    Alert and cooperative; normal mood and affect; normal attention span       Results Review:  I have personally reviewed most recent Data :  [facility]  CBC    Results from last 7 days   Lab Units 06/06/24  0620 06/03/24  0608 06/02/24  0752 06/01/24  0551 05/31/24  0620   WBC 10*3/mm3 10.27 7.71 7.94 8.26 14.38*   HEMOGLOBIN g/dL 12.9* 13.1 13.0 13.2 13.4   PLATELETS 10*3/mm3 261 195 204 168 184     CMP   Results from last 7 days   Lab Units 06/06/24  0826 06/06/24  0620 06/05/24  0654 06/04/24  0741 06/03/24  0608 06/02/24  0752 06/01/24  0551 05/31/24  0620   SODIUM mmol/L 141 140 136 140 140 141 138 137   POTASSIUM mmol/L 4.0 4.1 3.9 4.1 3.9 4.2 4.5 4.4   CHLORIDE mmol/L 106 106 102 106 105 107 106 102   CO2 mmol/L 22.7 21.2* 22.1 25.0 23.1 24.0 23.4 27.0   BUN mg/dL 28* 27* 24* 17 17 23 31* 32*   CREATININE mg/dL 1.83* 1.78* 1.45* 1.29* 1.07 0.94 1.22 1.51*   GLUCOSE mg/dL 76 79 108* 129* 105* 74 166* 97   ALBUMIN g/dL  --   --   --   --  3.2* 3.2*  --  3.3*   BILIRUBIN mg/dL  --   --   --   --   --   --   --  0.6   ALK PHOS U/L  --   --   --   --   --   --   --  60   AST (SGOT) U/L  --   --   --   --   --   --   --  30   ALT (SGPT) U/L  --   --   --   --   --   --   --  18     ABG          CT Head Without Contrast    Result Date:  6/3/2024   No acute process identified.    Radiation dose reduction techniques were utilized, including automated exposure control and exposure modulation based on body size.   This report was finalized on 6/3/2024 9:52 AM by Dr. Tad Godfrey M.D on Workstation: [id number]      US Renal Bilateral    Result Date: 5/31/2024  Thickened urinary bladder wall. Low volume bladder. No hydronephrosis.  This report was finalized on 5/31/2024 6:25 PM by Dr. Zechariah Falcon M.D on Workstation: [id number]      CT Abdomen Pelvis With Contrast    Result Date: 5/30/2024   1. Diffuse urinary bladder wall thickening and mild perivesical fat stranding, which could be accentuated by under distention but could reflect a nonspecific cystitis but recommend correlating with urinalysis. 2. Low lung volumes with multifocal bilateral perihilar and bibasilar opacities that likely represent subsegmental atelectasis and/or scarring. 3. Likely benign 9 mm subpleural nodule in the base of the lingula and likely reactive mediastinal and bilateral hilar lymphadenopathy given the long-term stability. 4. Nonspecific fat stranding in the left inguinal region. Correlate for recent trauma or instrumentation. 5. Chronic appearing splenic vein thrombosis with peripheral calcification and multiple venous collaterals in the left upper quadrant. Additional pancreatic/peripancreatic calcifications could reflect sequela of chronic pancreatitis. 6. Mildly heterogeneous hepatic attenuation with subtle nodular liver contours, which could reflect underlying hepatocellular disease/cirrhosis. 7. Tiny hiatal hernia with mild nonspecific thickening of the distal esophageal wall and mild gastric wall thickening that could be accentuated by under distention. Correlate for GERD and possible gastritis.  This report was finalized on 5/30/2024 7:35 AM by Julio Thomas MD on Workstation: [id number]      CT Chest With Contrast Diagnostic    Result Date: 5/30/2024   1.  Diffuse urinary bladder wall thickening and mild perivesical fat stranding, which could be accentuated by under distention but could reflect a nonspecific cystitis but recommend correlating with urinalysis. 2. Low lung volumes with multifocal bilateral perihilar and bibasilar opacities that likely represent subsegmental atelectasis and/or scarring. 3. Likely benign 9 mm subpleural nodule in the base of the lingula and likely reactive mediastinal and bilateral hilar lymphadenopathy given the long-term stability. 4. Nonspecific fat stranding in the left inguinal region. Correlate for recent trauma or instrumentation. 5. Chronic appearing splenic vein thrombosis with peripheral calcification and multiple venous collaterals in the left upper quadrant. Additional pancreatic/peripancreatic calcifications could reflect sequela of chronic pancreatitis. 6. Mildly heterogeneous hepatic attenuation with subtle nodular liver contours, which could reflect underlying hepatocellular disease/cirrhosis. 7. Tiny hiatal hernia with mild nonspecific thickening of the distal esophageal wall and mild gastric wall thickening that could be accentuated by under distention. Correlate for GERD and possible gastritis.  This report was finalized on 5/30/2024 7:35 AM by Julio Thomas MD on Workstation: [id number]      XR Chest 1 View    Result Date: 5/30/2024  Nonspecific bibasilar consolidation.  This report was finalized on 5/30/2024 4:47 AM by Dr. Gayle Okeefe M.D on Workstation: [id number]       Results for orders placed during the hospital encounter of 05/30/24    Adult Transthoracic Echo Complete W/ Cont if Necessary Per Protocol    Interpretation Summary    Left ventricular systolic function is hyperdynamic (EF > 70%). Calculated left ventricular EF = 75% Global longitudinal LV strain (GLS) = -17.8%. Left ventricle strain data was reviewed by the physician and found to be accurate. Global longitudinal LV strain is normal however  there is regional abnormality with apical sparing suggestive of amyloid heart disease. However there is also basal ptal hypertrophy suggestive of hypertrophic cardiomyopathy. Wall motion abnormality is also noted in the basal septum and cannot rule out ischemic component.Consider additional imaging such as cardiac MRI and further evaluation also for amyloid heart disease as clinically indicated. The left ventricular cavity is small in size. Left ventricular wall thickness is consistent with moderate to severe septal asymmetric hypertrophy. There is hypokinesis of the left ventricular basal septum. Left ventricular diastolic function is consistent with (grade II w/high LAP) pseudonormalization.    The left atrial cavity is mildly dilated.    No aortic valve regurgitation or stenosis is present. The aortic valve is abnormal in structure. There is mild thickening of the aortic valve.    There is mild, bileaflet mitral valve thickening present. Trace mitral valve regurgitation is present. No significant mitral valve stenosis is present.    Moderate tricuspid valve regurgitation is present. Estimated right ventricular systolic pressure from tricuspid regurgitation is markedly elevated (>55 mmHg). Calculated right ventricular systolic pressure from tricuspid regurgitation is 74 mmHg.        Assessment & Plan   Assessment and Plan:         Acute UTI (urinary tract infection)    Type 2 diabetes mellitus with hyperglycemia, with long-term current use of insulin    CAD (coronary artery disease)    Hx of CABG    Chronic diastolic heart failure    CSA (central sleep apnea)    HTN (hypertension)    History of stroke    CKD (chronic kidney disease) stage 3, GFR 30-59 ml/min    Peripheral arterial disease    Sepsis    Hyperkalemia    Metabolic encephalopathy    AMENA (acute kidney injury)    ASSESSMENT:  AMENA likely prerenal ATN 2/2 urosepsis with hemodynamic changes; on CKD III etiology likely diabetic and hypertensive nephropathy  with baseline sCr ~0.9-1.2 mg/dL  Hyperkalemia, now resolved  UTI, urine cultures with gram neg bacilli  Leukocytosis  Metabolic encephalopathy  PAD  Hx stroke  HTN  CHF  CAD  DM2  Chronic splenic vein thrombosis  Possible hepatocellular disease/cirrhosis  Likely benign subpleural nodule with bilateral lymphadenopathy  Hiatal hernia    Last TTE 10/5/22 with EF 66%, grade II DD    PLAN :     AMENA likely prerenal ATN 2/2 urosepsis with hemodynamic changes; on CKD III etiology likely diabetic and hypertensive nephropathy with baseline sCr ~0.9-1.2 mg/dL  Creatinine slightly higher than yesterday may be ATN taking time to recover  Slightly confused no significant edema at this time   decreasing Bumex to 0.5 mg daily  Volume with CXR with bibasilar consolidation,   Electrolytes, acid/base acceptable  Followed by infectious disease, evaluation noted, appreciate assistance.  BP trends acceptable  Cardiology following for new onset A-fib, evaluation noted  Avoid NSAIDs, nephrotoxic agents  We will follow and coordinate with team  Overall stable from renal standpoint  Discussed with family      Stefano Oneal MD  [facility]  6/6/2024  11:23 EDT

## 2024-06-06 NOTE — CASE MANAGEMENT/SOCIAL WORK
Continued Stay Note  [facility]     Patient Name: Dilip Verma  MRN: [id number]  Today's Date: 6/6/2024    Admit Date: 5/30/2024    Plan: Home with [facility]   Discharge Plan       Row Name 06/06/24 1329       Plan    Plan Home with [facility]    Patient/Family in Agreement with Plan yes    Plan Comments Plan for d/c home tomorrow, spoke with Dr. Larry, pt cleared from cardiology standpoint. [facility] has accepted, orders received and entered. Pt unable to wean off continuous oxygen, pt current with [facility] for nocturnal, orders received and entered for continuous oxygen at d/c, alerted [facility] need for transport tank. CCP will follow - Nabila GONZALEZ                   Discharge Codes    No documentation.                 Expected Discharge Date and Time       Expected Discharge Date Expected Discharge Time    Jun 7, 2024               Nabila Ramsey RN

## 2024-06-07 ENCOUNTER — APPOINTMENT (OUTPATIENT)
Dept: GENERAL RADIOLOGY | Facility: HOSPITAL | Age: 74
End: 2024-06-07
Payer: MEDICARE

## 2024-06-07 LAB
ANION GAP SERPL CALCULATED.3IONS-SCNC: 11.7 MMOL/L (ref 5–15)
BUN SERPL-MCNC: 28 MG/DL (ref 8–23)
BUN/CREAT SERPL: 18.9 (ref 7–25)
CALCIUM SPEC-SCNC: 8.5 MG/DL (ref 8.6–10.5)
CHLORIDE SERPL-SCNC: 107 MMOL/L (ref 98–107)
CO2 SERPL-SCNC: 21.3 MMOL/L (ref 22–29)
CREAT SERPL-MCNC: 1.48 MG/DL (ref 0.76–1.27)
EGFRCR SERPLBLD CKD-EPI 2021: 49 ML/MIN/1.73
GLUCOSE BLDC GLUCOMTR-MCNC: 110 MG/DL (ref 70–130)
GLUCOSE BLDC GLUCOMTR-MCNC: 114 MG/DL (ref 70–130)
GLUCOSE BLDC GLUCOMTR-MCNC: 166 MG/DL (ref 70–130)
GLUCOSE BLDC GLUCOMTR-MCNC: 63 MG/DL (ref 70–130)
GLUCOSE BLDC GLUCOMTR-MCNC: 70 MG/DL (ref 70–130)
GLUCOSE BLDC GLUCOMTR-MCNC: 97 MG/DL (ref 70–130)
GLUCOSE SERPL-MCNC: 119 MG/DL (ref 65–99)
POTASSIUM SERPL-SCNC: 3.8 MMOL/L (ref 3.5–5.2)
SODIUM SERPL-SCNC: 140 MMOL/L (ref 136–145)
WHOLE BLOOD HOLD SPECIMEN: NORMAL

## 2024-06-07 PROCEDURE — 82948 REAGENT STRIP/BLOOD GLUCOSE: CPT

## 2024-06-07 PROCEDURE — 80048 BASIC METABOLIC PNL TOTAL CA: CPT

## 2024-06-07 PROCEDURE — 71045 X-RAY EXAM CHEST 1 VIEW: CPT

## 2024-06-07 PROCEDURE — 99232 SBSQ HOSP IP/OBS MODERATE 35: CPT | Performed by: NURSE PRACTITIONER

## 2024-06-07 PROCEDURE — 63710000001 INSULIN GLARGINE PER 5 UNITS: Performed by: HOSPITALIST

## 2024-06-07 PROCEDURE — 25010000002 CEFEPIME PER 500 MG: Performed by: INTERNAL MEDICINE

## 2024-06-07 PROCEDURE — 97530 THERAPEUTIC ACTIVITIES: CPT

## 2024-06-07 PROCEDURE — 25010000002 BUMETANIDE PER 0.5 MG: Performed by: INTERNAL MEDICINE

## 2024-06-07 PROCEDURE — 25010000002 HALOPERIDOL LACTATE PER 5 MG: Performed by: NURSE PRACTITIONER

## 2024-06-07 PROCEDURE — 25010000002 OLANZAPINE 10 MG RECONSTITUTED SOLUTION: Performed by: INTERNAL MEDICINE

## 2024-06-07 RX ORDER — BUMETANIDE 0.25 MG/ML
1 INJECTION INTRAMUSCULAR; INTRAVENOUS ONCE
Qty: 4 ML | Refills: 0 | Status: COMPLETED | OUTPATIENT
Start: 2024-06-07 | End: 2024-06-07

## 2024-06-07 RX ORDER — TRAMADOL HYDROCHLORIDE 50 MG/1
50 TABLET ORAL EVERY 4 HOURS PRN
Status: DISPENSED | OUTPATIENT
Start: 2024-06-07 | End: 2024-06-12

## 2024-06-07 RX ORDER — TRAMADOL HYDROCHLORIDE 50 MG/1
50 TABLET ORAL ONCE
Status: COMPLETED | OUTPATIENT
Start: 2024-06-07 | End: 2024-06-17

## 2024-06-07 RX ORDER — HALOPERIDOL 5 MG/ML
2 INJECTION INTRAMUSCULAR ONCE AS NEEDED
Status: COMPLETED | OUTPATIENT
Start: 2024-06-07 | End: 2024-06-07

## 2024-06-07 RX ADMIN — OLANZAPINE 10 MG: 10 INJECTION, POWDER, LYOPHILIZED, FOR SOLUTION INTRAMUSCULAR at 14:29

## 2024-06-07 RX ADMIN — Medication 10 ML: at 09:57

## 2024-06-07 RX ADMIN — CEFEPIME 2000 MG: 2 INJECTION, POWDER, FOR SOLUTION INTRAVENOUS at 23:00

## 2024-06-07 RX ADMIN — DEXTROSE MONOHYDRATE 25 G: 25 INJECTION, SOLUTION INTRAVENOUS at 18:56

## 2024-06-07 RX ADMIN — BUMETANIDE 0.5 MG: 0.5 TABLET ORAL at 09:55

## 2024-06-07 RX ADMIN — CEFEPIME 2000 MG: 2 INJECTION, POWDER, FOR SOLUTION INTRAVENOUS at 10:01

## 2024-06-07 RX ADMIN — METOPROLOL TARTRATE 12.5 MG: 25 TABLET, FILM COATED ORAL at 21:17

## 2024-06-07 RX ADMIN — METOPROLOL TARTRATE 12.5 MG: 25 TABLET, FILM COATED ORAL at 09:57

## 2024-06-07 RX ADMIN — BRIMONIDINE TARTRATE 1 DROP: 2 SOLUTION OPHTHALMIC at 10:00

## 2024-06-07 RX ADMIN — OLANZAPINE 5 MG: 5 TABLET, FILM COATED ORAL at 21:18

## 2024-06-07 RX ADMIN — OLANZAPINE 10 MG: 10 INJECTION, POWDER, LYOPHILIZED, FOR SOLUTION INTRAMUSCULAR at 23:28

## 2024-06-07 RX ADMIN — INSULIN GLARGINE 20 UNITS: 100 INJECTION, SOLUTION SUBCUTANEOUS at 09:58

## 2024-06-07 RX ADMIN — DEXTROSE MONOHYDRATE 25 G: 25 INJECTION, SOLUTION INTRAVENOUS at 22:15

## 2024-06-07 RX ADMIN — APIXABAN 5 MG: 5 TABLET, FILM COATED ORAL at 09:52

## 2024-06-07 RX ADMIN — DULOXETINE HYDROCHLORIDE 30 MG: 30 CAPSULE, DELAYED RELEASE ORAL at 21:18

## 2024-06-07 RX ADMIN — BUMETANIDE 1 MG: 0.25 INJECTION INTRAMUSCULAR; INTRAVENOUS at 18:49

## 2024-06-07 RX ADMIN — DEXTROSE MONOHYDRATE 25 G: 25 INJECTION, SOLUTION INTRAVENOUS at 03:58

## 2024-06-07 RX ADMIN — AMIODARONE HYDROCHLORIDE 200 MG: 200 TABLET ORAL at 09:54

## 2024-06-07 RX ADMIN — HALOPERIDOL LACTATE 2 MG: 5 INJECTION, SOLUTION INTRAMUSCULAR at 04:01

## 2024-06-07 NOTE — PROGRESS NOTES
PROGRESS NOTE      Patient Name: Dilip Verma  : 1950  MRN: [id number]  Primary Care Physician: Fernando Camargo DO  Date of admission: 2024    Patient Care Team:  Fernando Camargo DO as PCP - General (Family Medicine)  Morris Cai MD as Consulting Physician (Sleep Medicine)  Michaela Hylton MD as Consulting Physician (Cardiology)  Hardy Banerjee MD as Consulting Physician (Ophthalmology)  Chula Christianson MD as Consulting Physician (Ophthalmology)  Jason Sherman MD (Endocrinology)  Perla Mayen MD as Consulting Physician (Nephrology)  Federico Hebert MD as Consulting Physician (Pulmonary Disease)  Niraj Ravi MD as Consulting Physician (Orthopedic Surgery)  Papa Velasco MD as Consulting Physician (Urology)  Terese Yost MD as Consulting Physician (Gastroenterology)  Aracelis Ball MD as Consulting Physician (Colon and Rectal Surgery)  [facility] as Primary Care Provider        Reason for Follow up:     AMENA, CKD III      Subjective:     Seen and examined, more confused     Review of systems:    Limited exam due to confusion      Constitutional: Seen and examined, more confused restless   HEENT:  No headache, otalgia, itchy eyes, nasal discharge or sore throat.  Cardiac:  No chest pain, dyspnea, orthopnea or PND.  Chest:              No cough, phlegm or wheezing.  Abdomen:  No abdominal pain, nausea or vomiting.  Neuro:  No focal weakness, abnormal movements orseizure like activity.  :   Dysuria  ROS was otherwise negative except as mentioned in the [place].       Personal History:     Past Medical History:   Past Medical History:   Diagnosis Date    AMENA (acute kidney injury) 2020    Alcoholism 1989    not since     CORI positive     Anemia     Anxiety     Ataxia     Atypical chest pain 2022    SEEN AT [facility] ER    Balance disorder     Carotid stenosis 3/28/2024    Cataract     BILATERAL, S/P EXTRACTION    Cervical  "radiculopathy 11/11/2019    SEEN AT  [facility] ER    Cervical spinal cord compression     Chronic diastolic (congestive) heart failure     Chronic kidney disease     STAGE 3, FOLLOWED BY DR. VIVAR    Chronic pancreatitis     Closed left subtrochanteric femur fracture 12/01/2020    ADMITTED TO [facility]    Closed nondisplaced intertrochanteric fracture of left femur 12/01/2020    ADMITTED TO [facility]    Colon polyps     FOLLOWED BY DR. AVTAR JEONG    Constipation     Contracture, right hand     Coronary artery disease     CABG 7/2019    COVID-19 07/2022    DDD (degenerative disc disease), cervical     Diabetes mellitus, type II     IDDM    Diabetic retinopathy     Difficulty walking     Dysphagia     Elevated brain natriuretic peptide (BNP) level 08/2014    Elevated LFTs 03/2021    Erectile dysfunction     Fissure, anal 2022    Foot drop     Fuchs' corneal dystrophy of right eye     Glaucoma     BILATERAL    History of alcohol abuse     Hyperlipidemia     Hypersomnia     Hypertension     Hypertensive urgency 02/25/2020    ADMITTED TO [facility]    Insomnia     Kidney stones     LV dysfunction 06/2016    Lyme disease     Lymphadenopathy syndrome 05/2021    Macular edema     BILATERAL    Myocardial infarction 11/05/2019    NSTEMI, ADMITTED TO [facility]    Myocardial infarction 07/12/2019    NSTEMI, ADMITTED TO [facility]    Neuropathy in diabetes     Non-celiac gluten sensitivity     Orthostasis     Osteoporosis     Oxygen dependent     PAD (peripheral artery disease)     Paroxysmal atrial fibrillation 6/6/2024    PNA (pneumonia) 06/2016    LEFT LOBE    Polyneuropathy     PTSD (post-traumatic stress disorder)     Pulmonary hypertension     Pulmonary nodule     Senile ectropion of both lower eyelids 02/2022    Sepsis 12/16/2020    D/T UTI, ADMITTED TO [facility]    Sleep apnea     STATES DOESN'T USE BIPAP OR CPAP    Spinal stenosis in cervical region     SEVERE-LIMITED ROM    Stroke 2012    \"slight stroke\"    Syncope and collapse 07/06/2019    ADMITTED TO " [facility]    Urinary retention 04/05/2021    SEEN AT [facility] ER    Vision loss     Vitamin D deficiency        Surgical History:      Past Surgical History:   Procedure Laterality Date    CARDIAC CATHETERIZATION N/A 07/15/2019    Procedure: Coronary angiography;  Surgeon: Carrie Price MD;  Location:  [facility];  Service: Cardiovascular    CARDIAC CATHETERIZATION N/A 07/15/2019    Procedure: Left Heart Cath;  Surgeon: Carrie Price MD;  Location:  [facility];  Service: Cardiovascular    CARDIAC CATHETERIZATION N/A 07/15/2019    Procedure: Left ventriculography;  Surgeon: Carrie Price MD;  Location:  [facility];  Service: Cardiovascular    CARDIAC CATHETERIZATION  07/15/2019    Procedure: Functional Flow Oneco;  Surgeon: Carrie Price MD;  Location:  [facility];  Service: Cardiovascular    CARDIAC CATHETERIZATION N/A 11/06/2019    Procedure: Right and Left Heart Cath;  Surgeon: Mya Smith MD;  Location:  [facility];  Service: Cardiovascular    CARDIAC CATHETERIZATION N/A 11/06/2019    Procedure: Coronary angiography;  Surgeon: Mya Smith MD;  Location:  [facility];  Service: Cardiovascular    CARDIAC SURGERY      CATARACT EXTRACTION Left 2014    CATARACT EXTRACTION Right 11/2016    PHACO/IOL, DR. LEN DENTON    COLONOSCOPY N/A 03/16/2023    ENTIRE COLON WNL, RESCOPE IN 5 YRS, DR. LINDA LIZARRAGA AT [facility]    COLONOSCOPY W/ POLYPECTOMY N/A 01/02/2015    A FEW DIVERTICULA IN SIGMOID, 6 MM TUBULOVILLOUS ADENOMA POLYP IN RECTUM, SMALL HEMORRHOIDS, MELANOSIS COLI, DR. AVTAR JEONG AT [facility]    CORONARY ARTERY BYPASS GRAFT N/A 07/18/2019    Procedure: INTRAOPERATIVE SHOAIB; STERNOTOMY CORONARY ARTERY BYPASS x 3  USING LEFT INTERNAL MAMMARY ARTERY GRAFT UTILIZING ENDOSCOPICALLY HARVESTED RIGHT GREATER SAPHENOUS VEIN AND PRP.;  Surgeon: Bill Devi MD;  Location: [facility] OR;  Service:  Cardiothoracic    CYSTOSCOPY BLADDER STONE LITHOTRIPSY N/A     DENTAL PROCEDURE Bilateral     3 surgeries inder implants    FEMUR IM NAILING/RODDING Left 12/03/2020    Procedure: LEFT HIP INTRAMEDULLARY NAIL;  Surgeon: Niraj Ravi MD;  Location: [facility] OR;  Service: Orthopedic Spine;  Laterality: Left;    INCISION AND DRAINAGE PERIRECTAL ABSCESS N/A 10/04/2023    Procedure: Incision and drainage of perianal and buttock abscess;  Surgeon: Herbie Villatoro MD;  Location: [facility] OR;  Service: General;  Laterality: N/A;    INGUINAL HERNIA REPAIR Bilateral     TOENAIL EXCISION  06/2022    TONSILLECTOMY Bilateral     TOTAL HIP ARTHROPLASTY REVISION Left 01/11/2022    Procedure: TOTAL HIP ARTHROPLASTY REVISION- POSTERIOR;  Surgeon: Jurgen Candelaria II, MD;  Location: [facility] OR;  Service: Orthopedics;  Laterality: Left;    VASECTOMY N/A        Family History: family history includes Alcohol abuse in his brother and paternal grandfather; Arrhythmia in his mother; Cancer in his mother; Depression in his mother and sister; Glaucoma in his maternal grandmother; Heart attack in his paternal grandmother; Heart disease in his father and mother; Heart failure in his father; Hyperlipidemia in his father and mother; Hypertension in his father and mother; Kidney disease in his father; Lung disease in his paternal uncle; Mental illness in his mother; Migraines in his mother; Stroke in his maternal grandmother and paternal grandmother; Thyroid disease in his father and paternal uncle; Uterine cancer in his mother. Otherwise pertinent FHx was reviewed and unremarkable.     Social History:  reports that he quit smoking about 24 years ago. His smoking use included cigarettes. He started smoking about 40 years ago. He has a 12 pack-year smoking history. He has been exposed to tobacco smoke. He has never used smokeless tobacco. He reports that he does not currently use alcohol. He reports that he does not  use drugs.    Medications:  Prior to Admission medications    Medication Sig Start Date End Date Taking? Authorizing Provider   aspirin 81 MG EC tablet Take 1 tablet by mouth Every Night.   Yes Heri Rinaldi MD   brimonidine (ALPHAGAN) 0.2 % ophthalmic solution Administer 1 drop to both eyes 2 (Two) Times a Day.   Yes Heri Rinaldi MD   bumetanide (BUMEX) 1 MG tablet Take 1 tablet by mouth Daily.   Yes Heri Rinaldi MD   Cholecalciferol (Vitamin D-3) 125 MCG (5000 UT) tablet Take 1 tablet by mouth Daily.   Yes Heri Rinaldi MD   DULoxetine (CYMBALTA) 30 MG capsule Take 1 capsule by mouth Every Night. 1/16/24  Yes Heri Rinaldi MD   Insulin Glargine, 2 Unit Dial, (TOUJEO) 300 UNIT/ML solution pen-injector injection Inject 24 Units under the skin into the appropriate area as directed Daily.   Yes Heri Rinaldi MD   insulin lispro (humaLOG) 100 UNIT/ML injection Inject 0-14 Units under the skin into the appropriate area as directed 3 (Three) Times a Day Before Meals.  Patient taking differently: Inject 0-14 Units under the skin into the appropriate area as directed 3 (Three) Times a Day Before Meals. SLIDING SCALE  100-150 - 4 units  151-200 - 5 units  201-250 - 6 units  251-300 - 7 units  301-350 - 8 units  351-400 - 9 units  401+ - 10 units 12/7/20  Yes Amador Valverde MD   latanoprost (XALATAN) 0.005 % ophthalmic solution Administer 1 drop to both eyes every night at bedtime. 1/16/23  Yes Heri Rinaldi MD   multivitamin with minerals (MULTIVITAMIN ADULTS PO) Take 1 tablet by mouth Daily.   Yes Heri Rinaldi MD   testosterone (ANDROGEL) 25 MG/2.5GM (1%) gel gel Place 25 mg on the skin as directed by provider 2 (Two) Times a Week.   Yes Heri Rinaldi MD   traMADol (ULTRAM) 50 MG tablet Take 1 tablet by mouth At Night As Needed. 10/16/23  Yes Heri Rinaldi MD   Magnesium 400 MG capsule Take 400 mg by mouth As Needed.    [place]  MD Heri   sildenafil (REVATIO) 20 MG tablet Take 1 tablet by mouth As Needed.    Provider, MD Heri     Scheduled Meds:amiodarone, 200 mg, Oral, Q24H  apixaban, 5 mg, Oral, Q12H  aspirin, 81 mg, Oral, Daily  brimonidine, 1 drop, Both Eyes, BID  bumetanide, 0.5 mg, Oral, Daily  cefepime, 2,000 mg, Intravenous, Q12H  DULoxetine, 30 mg, Oral, Daily  hydrALAZINE, 25 mg, Oral, Q8H  insulin glargine, 20 Units, Subcutaneous, Daily  insulin lispro, 2-9 Units, Subcutaneous, 4x Daily AC & at Bedtime  latanoprost, 1 drop, Both Eyes, Nightly  metoprolol tartrate, 12.5 mg, Oral, Q12H  OLANZapine, 5 mg, Oral, Nightly  senna-docusate sodium, 2 tablet, Oral, BID  sodium chloride, 10 mL, Intravenous, Q12H  terazosin, 1 mg, Oral, Nightly  traMADol, 50 mg, Oral, Once      Continuous Infusions:     PRN Meds:  acetaminophen **OR** acetaminophen **OR** acetaminophen    senna-docusate sodium **AND** polyethylene glycol **AND** bisacodyl **AND** bisacodyl    dextrose    dextrose    glucagon (human recombinant)    nitroglycerin    OLANZapine    ondansetron    sodium chloride    sodium chloride  Allergies:    Allergies   Allergen Reactions    Ace Inhibitors Angioedema    Angiotensin Receptor Blockers Angioedema and Unknown (See Comments)     Angioneurotic edema    Dapagliflozin Other (See Comments)     UTI,  rectal abcess    Losartan Angioedema     Angioneurotic edema    Seroquel [Quetiapine] Hallucinations    Xanax [Alprazolam] Unknown - High Severity    Amlodipine Swelling    Ativan [Lorazepam] Hallucinations    Baclofen Hallucinations    Misc. Sulfonamide Containing Compounds Unknown (See Comments)    Minoxidil Confusion    Tetracycline Rash     bliisters in mouth         Objective   Exam:     Vital Signs  Temp:  [97.7 °F (36.5 °C)-99.3 °F (37.4 °C)] 98.1 °F (36.7 °C)  Heart Rate:  [] 102  Resp:  [18] 18  BP: ()/(63-95) 194/72  SpO2:  [88 %-93 %] 91 %  on  Flow (L/min):  [2-5] 5;   Device (Oxygen Therapy): nasal  cannula  Body mass index is 31.02 kg/m².  EXAM  General:  Chronically ill appearing male in no acute distress.    Head:      Normocephalic and atraumatic.    Eyes:      PERRL/EOM intact, conjunctivae and sclerae clear without nystagmus.    Neck:      No masses, thyromegaly,  trachea central   Lungs:    Clear bilaterally to auscultation.    Heart:      Regular rate and rhythm, no murmur no gallop  Abd:        Soft, nontender, not distended, bowel sounds positive, no shifting dullness.  Msk:        No deformity or scoliosis noted of thoracic or lumbar spine.    Pulses:   Pulses normal in all 4 extremities.    Extremities:        No cyanosis or clubbing- no edema.    Neuro:    No focal deficits.   alert oriented x3  Skin:       Intact without lesions or rashes.    Psych:    Confused       Results Review:  I have personally reviewed most recent Data :  [facility]  CBC    Results from last 7 days   Lab Units 06/06/24  0620 06/03/24  0608 06/02/24  0752 06/01/24  0551   WBC 10*3/mm3 10.27 7.71 7.94 8.26   HEMOGLOBIN g/dL 12.9* 13.1 13.0 13.2   PLATELETS 10*3/mm3 261 195 204 168     CMP   Results from last 7 days   Lab Units 06/07/24  0651 06/06/24  1715 06/06/24  0826 06/06/24  0620 06/05/24  0654 06/04/24  0741 06/03/24  0608 06/02/24  0752   SODIUM mmol/L 140 142 141 140 136 140 140 141   POTASSIUM mmol/L 3.8 3.9 4.0 4.1 3.9 4.1 3.9 4.2   CHLORIDE mmol/L 107 106 106 106 102 106 105 107   CO2 mmol/L 21.3* 20.3* 22.7 21.2* 22.1 25.0 23.1 24.0   BUN mg/dL 28* 29* 28* 27* 24* 17 17 23   CREATININE mg/dL 1.48* 1.61* 1.83* 1.78* 1.45* 1.29* 1.07 0.94   GLUCOSE mg/dL 119* 78 76 79 108* 129* 105* 74   ALBUMIN g/dL  --   --   --   --   --   --  3.2* 3.2*     ABG          US Renal Bilateral    Result Date: 5/31/2024  Thickened urinary bladder wall. Low volume bladder. No hydronephrosis.  This report was finalized on 5/31/2024 6:25 PM by Dr. Zechariah Falcon M.D on Workstation: [id number]       Results for orders  placed during the hospital encounter of 05/30/24    Adult Transthoracic Echo Complete W/ Cont if Necessary Per Protocol    Interpretation Summary    Left ventricular systolic function is hyperdynamic (EF > 70%). Calculated left ventricular EF = 75% Global longitudinal LV strain (GLS) = -17.8%. Left ventricle strain data was reviewed by the physician and found to be accurate. Global longitudinal LV strain is normal however there is regional abnormality with apical sparing suggestive of amyloid heart disease. However there is also basal ptal hypertrophy suggestive of hypertrophic cardiomyopathy. Wall motion abnormality is also noted in the basal septum and cannot rule out ischemic component.Consider additional imaging such as cardiac MRI and further evaluation also for amyloid heart disease as clinically indicated. The left ventricular cavity is small in size. Left ventricular wall thickness is consistent with moderate to severe septal asymmetric hypertrophy. There is hypokinesis of the left ventricular basal septum. Left ventricular diastolic function is consistent with (grade II w/high LAP) pseudonormalization.    The left atrial cavity is mildly dilated.    No aortic valve regurgitation or stenosis is present. The aortic valve is abnormal in structure. There is mild thickening of the aortic valve.    There is mild, bileaflet mitral valve thickening present. Trace mitral valve regurgitation is present. No significant mitral valve stenosis is present.    Moderate tricuspid valve regurgitation is present. Estimated right ventricular systolic pressure from tricuspid regurgitation is markedly elevated (>55 mmHg). Calculated right ventricular systolic pressure from tricuspid regurgitation is 74 mmHg.        Assessment & Plan   Assessment and Plan:         Acute UTI (urinary tract infection)    Type 2 diabetes mellitus with hyperglycemia, with long-term current use of insulin    CAD (coronary artery disease)    Hx of  CABG    Chronic diastolic heart failure    CSA (central sleep apnea)    HTN (hypertension)    History of stroke    CKD (chronic kidney disease) stage 3, GFR 30-59 ml/min    Peripheral arterial disease    Sepsis    Hyperkalemia    Metabolic encephalopathy    AMENA (acute kidney injury)    Paroxysmal atrial fibrillation    ASSESSMENT:  AMENA likely prerenal ATN 2/2 urosepsis with hemodynamic changes; on CKD III etiology likely diabetic and hypertensive nephropathy with baseline sCr ~0.9-1.2 mg/dL  Hyperkalemia, now resolved  UTI, urine cultures with gram neg bacilli  Leukocytosis  Metabolic encephalopathy  PAD  Hx stroke  HTN  CHF  CAD  DM2  Chronic splenic vein thrombosis  Possible hepatocellular disease/cirrhosis  Likely benign subpleural nodule with bilateral lymphadenopathy  Hiatal hernia    Last TTE 10/5/22 with EF 66%, grade II DD    PLAN :     AMENA likely prerenal ATN 2/2 urosepsis with hemodynamic changes; on CKD III etiology likely diabetic and hypertensive nephropathy with baseline sCr ~0.9-1.2 mg/dL  Creatinine better   Currently on Bumex 0.5 mg daily, will continue  Volume with CXR with bibasilar consolidation,   Electrolytes, acid/base acceptable  Followed by infectious disease, evaluation noted, appreciate assistance.  BP trends acceptable  Cardiology following for new onset A-fib, evaluation noted  Avoid NSAIDs, nephrotoxic agents  We will follow and coordinate with team  Overall stable from renal standpoint  More confused today   stable from renal standpoint to be discharged with outpatient follow-up in 1 to 2 weeks, when medically stable       Perla Mayen MD  [facility]  6/7/2024  12:43 EDT

## 2024-06-07 NOTE — SIGNIFICANT NOTE
06/07/24 1500   OTHER   Discipline occupational therapist   Rehab Time/Intention   Session Not Performed other (see comments)  (spoke with physical therapist following their session, reports pt no following commands, hallucinating, restraints now, resistive and unable to sequence. will hold and f/u next service date for OT)   Recommendation   OT - Next Appointment 06/10/24

## 2024-06-07 NOTE — NURSING NOTE
Call out to Nephrology per A suggestion.       Pt refusing anything PO, Blood glucose has been low tonight.   D50 given x2.     Does this pt need some IVF's with some dextrose until he is more alert?          Also, pt o2 needs are increasing. From 2L to now needing 5L with some labored breathing.   Bumex is scheduled for 9am.   Can this be given early?

## 2024-06-07 NOTE — PROGRESS NOTES
Hospital Follow Up    LOS:  LOS: 8 days   Patient Name: Dilip Verma  Age/Sex: 74 y.o. male  : 1950  MRN: [id number]    Day of Service: 24   Length of Stay: 8  Encounter Provider: LEIDY Hall  Place of Service: [facility] CARDIOLOGY  Patient Care Team:  Fernando Camargo DO as PCP - General (Family Medicine)  Morris Cai MD as Consulting Physician (Sleep Medicine)  Michaela Hylton MD as Consulting Physician (Cardiology)  Hardy Banerjee MD as Consulting Physician (Ophthalmology)  Chula Christianson MD as Consulting Physician (Ophthalmology)  Jason Sherman MD (Endocrinology)  Perla Mayen MD as Consulting Physician (Nephrology)  Federico Hebert MD as Consulting Physician (Pulmonary Disease)  Niraj Ravi MD as Consulting Physician (Orthopedic Surgery)  Papa Velasco MD as Consulting Physician (Urology)  Terese Yost MD as Consulting Physician (Gastroenterology)  Aracelis Ball MD as Consulting Physician (Colon and Rectal Surgery)  [facility] as Primary Care Provider    Subjective:     Chief Complaint: afib/uti    Interval History: confused    Objective:     Objective:  Temp:  [97.7 °F (36.5 °C)-99.3 °F (37.4 °C)] 98.1 °F (36.7 °C)  Heart Rate:  [] 102  Resp:  [18] 18  BP: ()/(63-95) 194/72     Intake/Output Summary (Last 24 hours) at 2024 1353  Last data filed at 2024 1000  Gross per 24 hour   Intake 80 ml   Output 1300 ml   Net -1220 ml     Body mass index is 31.02 kg/m².      24  0350 24  1103 24  0819   Weight: 86.6 kg (191 lb) 92.9 kg (204 lb 12.9 oz) 92.5 kg (204 lb)     Weight change:     Physical Exam:   General Appearance:    Awakens to name. No purposeful commands. Delirium noted   Color:  Skin:  Neuro:  HEENT:    Lungs:     Pink  Warm and dry  No focal, motor or sensory deficits  Neck supple, pupils equal, round and reactive. No JVD, No Bruit  Clear to  "auscultation,respirations regular, even and                  unlabored    Heart:    Regular rate and rhythm, S1 and S2, no murmur, no gallop, no rub. No edema, DP/PT pulses are 2+   Chest Wall:    No abnormalities observed   Abdomen:     Normal bowel sounds, no masses, no organomegaly, soft        non-tender, non-distended, no guarding, no ascites noted   Extremities:   Moves all extremities well, no edema, no cyanosis, no redness       Lab Review:   Results from last 7 days   Lab Units 06/07/24  0651 06/06/24  1715   SODIUM mmol/L 140 142   POTASSIUM mmol/L 3.8 3.9   CHLORIDE mmol/L 107 106   CO2 mmol/L 21.3* 20.3*   BUN mg/dL 28* 29*   CREATININE mg/dL 1.48* 1.61*   GLUCOSE mg/dL 119* 78   CALCIUM mg/dL 8.5* 8.6         Results from last 7 days   Lab Units 06/06/24  0620 06/03/24  0608   WBC 10*3/mm3 10.27 7.71   HEMOGLOBIN g/dL 12.9* 13.1   HEMATOCRIT % 38.5 39.7   PLATELETS 10*3/mm3 261 195         Results from last 7 days   Lab Units 06/04/24  0741 06/03/24  0608   MAGNESIUM mg/dL 2.0 2.0           Invalid input(s): \"LDLCALC\"      Results from last 7 days   Lab Units 06/04/24  1006   TSH uIU/mL 2.020     I reviewed the patient's new clinical results.  I personally viewed and interpreted the patient's EKG  Current Medications:   Scheduled Meds:amiodarone, 200 mg, Oral, Q24H  apixaban, 5 mg, Oral, Q12H  aspirin, 81 mg, Oral, Daily  brimonidine, 1 drop, Both Eyes, BID  bumetanide, 0.5 mg, Oral, Daily  cefepime, 2,000 mg, Intravenous, Q12H  DULoxetine, 30 mg, Oral, Daily  hydrALAZINE, 25 mg, Oral, Q8H  insulin glargine, 20 Units, Subcutaneous, Daily  insulin lispro, 2-9 Units, Subcutaneous, 4x Daily AC & at Bedtime  latanoprost, 1 drop, Both Eyes, Nightly  metoprolol tartrate, 12.5 mg, Oral, Q12H  OLANZapine, 5 mg, Oral, Nightly  senna-docusate sodium, 2 tablet, Oral, BID  sodium chloride, 10 mL, Intravenous, Q12H  terazosin, 1 mg, Oral, Nightly  traMADol, 50 mg, Oral, Once      Continuous Infusions: "     Allergies:  Allergies   Allergen Reactions    Ace Inhibitors Angioedema    Angiotensin Receptor Blockers Angioedema and Unknown (See Comments)     Angioneurotic edema    Dapagliflozin Other (See Comments)     UTI,  rectal abcess    Losartan Angioedema     Angioneurotic edema    Seroquel [Quetiapine] Hallucinations    Xanax [Alprazolam] Unknown - High Severity    Amlodipine Swelling    Ativan [Lorazepam] Hallucinations    Baclofen Hallucinations    Misc. Sulfonamide Containing Compounds Unknown (See Comments)    Minoxidil Confusion    Tetracycline Rash     bliisters in mouth         Assessment:   New onset afib with rvr- CHADSVASC2 of 5. Start Eliquis 5mg BID will be $112 a month. Remains in SR on amiodarone. AC with eliquis 5mg BID.Continue low dose BB. HR in the 50's use low dose amiodarone and check EKG tomorrow  RBBB- chronic  Urosepsis/Uti- gram negative on ABX  LLE celullitis- improving  Coronary artery disease- CABG 2019. On Asa, BB. No statin because LDL has been controlled. NO angina symptoms  Pulmonary hypertension- on revation  Mild bilateral carotid disease        Plan:       Hallucinations per primary. They restarted his home tramadol and he is getting gprn zyprexa. Probably hospital psychosis in addition to the UTI, Cardiac status is stable please call if needed.     LEIDY Hall  06/07/24  13:53 EDT  Electronically signed by LEIDY Hall, 06/07/24, 1:53 PM EDT.

## 2024-06-07 NOTE — NURSING NOTE
Pt severely confused and having hallucinations.   Continued with soft wrist restraints bilaterally due to removing equipment, clothing, and attempting to hit and kick staff and his wife.      Bed rails have been padded. Pts right shin had some abrasions, wrapped with curlex and ace bandage due to kicking side rails    IM zyprexa given with very little change.     pt was alert enough to take morning medications but night time medications he spit out forcefully in whatever direction he is facing.   BG checked and was at 64. Gave D5W and brought it up to 166        VSS. Now on 5L via n/c

## 2024-06-07 NOTE — PROGRESS NOTES
Name: Dilip Verma ADMIT: 2024   : 1950  PCP: Fernando Camargo DO    MRN: [id number] LOS: 8 days   AGE/SEX: 74 y.o. male  ROOM: [facility]     Subjective   Subjective   Patient had some agitation and hallucinations overnight. Wife refused zyprexa as she was concerned about exacerbating his hallucinations.  His wife is at bedside.    Objective   Objective   Vital Signs  Temp:  [97.7 °F (36.5 °C)-99.3 °F (37.4 °C)] 98.1 °F (36.7 °C)  Heart Rate:  [] 102  Resp:  [18] 18  BP: ()/(63-95) 194/72  SpO2:  [88 %-93 %] 91 %  on  Flow (L/min):  [2-5] 5;   Device (Oxygen Therapy): nasal cannula  Body mass index is 31.02 kg/m².  Physical Exam  Vitals and nursing note reviewed.   Constitutional:       General: He is not in acute distress.     Appearance: He is ill-appearing (chronically). He is not toxic-appearing or diaphoretic.   HENT:      Head: Normocephalic and atraumatic.      Nose: Nose normal.      Mouth/Throat:      Mouth: Mucous membranes are moist.      Pharynx: Oropharynx is clear.   Eyes:      Conjunctiva/sclera: Conjunctivae normal.      Pupils: Pupils are equal, round, and reactive to light.   Cardiovascular:      Rate and Rhythm: Normal rate and regular rhythm.      Pulses: Normal pulses.   Pulmonary:      Effort: Pulmonary effort is normal.      Breath sounds: Normal breath sounds.   Abdominal:      General: Bowel sounds are normal.      Palpations: Abdomen is soft.      Tenderness: There is no abdominal tenderness.   Musculoskeletal:         General: Swelling (1-2+ BLE) present.      Cervical back: Neck supple.   Skin:     General: Skin is warm and dry.   Neurological:      Mental Status: He is alert.       Results Review     I reviewed the patient's new clinical results.  Results from last 7 days   Lab Units 24  0620 24  0608 24  0752 24  0551   WBC 10*3/mm3 10.27 7.71 7.94 8.26   HEMOGLOBIN g/dL 12.9* 13.1 13.0 13.2   PLATELETS 10*3/mm3 261 195 204 168      Results from last 7 days   Lab Units 06/07/24  0651 06/06/24  1715 06/06/24  0826 06/06/24  0620   SODIUM mmol/L 140 142 141 140   POTASSIUM mmol/L 3.8 3.9 4.0 4.1   CHLORIDE mmol/L 107 106 106 106   CO2 mmol/L 21.3* 20.3* 22.7 21.2*   BUN mg/dL 28* 29* 28* 27*   CREATININE mg/dL 1.48* 1.61* 1.83* 1.78*   GLUCOSE mg/dL 119* 78 76 79   EGFR mL/min/1.73 49.0* 44.3* 38.0* 39.3*     Results from last 7 days   Lab Units 06/03/24  0608 06/02/24  0752   ALBUMIN g/dL 3.2* 3.2*     Results from last 7 days   Lab Units 06/07/24  0651 06/06/24  1715 06/06/24  0826 06/06/24  0620 06/05/24  0654 06/04/24  0741 06/03/24  0608 06/02/24  0752   CALCIUM mg/dL 8.5* 8.6 8.7 8.6   < > 8.3* 8.6 8.5*   ALBUMIN g/dL  --   --   --   --   --   --  3.2* 3.2*   MAGNESIUM mg/dL  --   --   --   --   --  2.0 2.0 2.1   PHOSPHORUS mg/dL  --   --   --   --   --   --  3.1 2.4*    < > = values in this interval not displayed.       Glucose   Date/Time Value Ref Range Status   06/07/2024 1113 110 70 - 130 mg/dL Final   06/07/2024 0608 114 70 - 130 mg/dL Final   06/07/2024 0249 70 70 - 130 mg/dL Final   06/06/2024 2355 144 (H) 70 - 130 mg/dL Final   06/06/2024 2306 62 (L) 70 - 130 mg/dL Final   06/06/2024 2028 82 70 - 130 mg/dL Final   06/06/2024 1756 86 70 - 130 mg/dL Final       No radiology results for the last day    I have personally reviewed all medications:  Scheduled Medications  amiodarone, 200 mg, Oral, Q24H  apixaban, 5 mg, Oral, Q12H  aspirin, 81 mg, Oral, Daily  brimonidine, 1 drop, Both Eyes, BID  bumetanide, 0.5 mg, Oral, Daily  cefepime, 2,000 mg, Intravenous, Q12H  DULoxetine, 30 mg, Oral, Daily  hydrALAZINE, 25 mg, Oral, Q8H  insulin glargine, 20 Units, Subcutaneous, Daily  insulin lispro, 2-9 Units, Subcutaneous, 4x Daily AC & at Bedtime  latanoprost, 1 drop, Both Eyes, Nightly  metoprolol tartrate, 12.5 mg, Oral, Q12H  OLANZapine, 5 mg, Oral, Nightly  senna-docusate sodium, 2 tablet, Oral, BID  sodium chloride, 10 mL,  Intravenous, Q12H  terazosin, 1 mg, Oral, Nightly  traMADol, 50 mg, Oral, Once    Infusions   Diet  Diet: Regular/House, Diabetic, Cardiac; Healthy Heart (2-3 Na+); Consistent Carbohydrate; Texture: Regular (IDDSI 7); Fluid Consistency: Thin (IDDSI 0)    I have personally reviewed:  [x]  Laboratory   []  Microbiology   []  Radiology   [x]  EKG/Telemetry  []  Cardiology/Vascular   []  Pathology    []  Records    Assessment/Plan     Active Hospital Problems    Diagnosis  POA    **Acute UTI (urinary tract infection) [N39.0]  Yes    Paroxysmal atrial fibrillation [I48.0]  Yes    AMENA (acute kidney injury) [N17.9]  Yes    Sepsis [A41.9]  Yes    Hyperkalemia [E87.5]  Yes    Metabolic encephalopathy [G93.41]  Yes    Peripheral arterial disease [I73.9]  Yes    History of stroke [Z86.73]  Not Applicable    CKD (chronic kidney disease) stage 3, GFR 30-59 ml/min [N18.30]  Yes    HTN (hypertension) [I10]  Yes    CSA (central sleep apnea) [G47.31]  Yes    Chronic diastolic heart failure [I50.32]  Yes    Hx of CABG [Z95.1]  Not Applicable    CAD (coronary artery disease) [I25.10]  Yes    Type 2 diabetes mellitus with hyperglycemia, with long-term current use of insulin [E11.65, Z79.4]  Not Applicable      Resolved Hospital Problems   No resolved problems to display.   Acute UTI/LLE Cellulitis  - urine culture growing Serratia  - switch to augmentin and ciprofloxacin at discharge to complete a 10d course  - appreciate ID recs    HTNCAD/PAD/New Onset Afib (PAF)/Chronic Diastolic CHF  - BP acceptable, no anginal symptoms, in NSR  - continue on current regimen  - appreciate cardiology recs    AMENA  - likely pre-renal with ATN  - overall stable  - continue on bumex  - appreciate nephrology recs    Hypoxia  - oxygen requirements increased from 2L to 5L overnight-will check xray and encourage pulmonary toilet    Delirium  - continue on PRN zyprexa  - he is current with palliatus at home and takes PRN tramadol, will order this as well  -  continue PRN zyprexa-I explained the importance of controlling hallucinations and regulating sleep cycle to his wife    Eliquis (home med) for DVT prophylaxis.  Full code.  Discussed with patient, spouse, and nursing staff.  Anticipate discharge home with family tomorrow.  Expected Discharge Date: 6/10/2024; Expected Discharge Time:       Darci Westbrook MD  [facility]  06/07/24  12:45 EDT    Portions of this text have been copied and I have reviewed them. They are accurate as of 6/7/2024

## 2024-06-07 NOTE — PLAN OF CARE
Goal Outcome Evaluation:  Plan of Care Reviewed With: patient, spouse        Progress: declining  Outcome Evaluation:     Pt severely confused and having hallucinations.   Now in soft wrist restraints Bilaterally due to Removing equipment, clothing, and attempting to hit staff and his wife.     IM Haldol given without much difference in behavior.   Pt wife declined Zyprexa, stating she believes it has made his confusion worse.       pt refused to take anything PO tonight. spitting his medications out forcefully in whatever direction he is facing.     BG has been low tonight requiring D50 x2.   [facility] aware.       IV abx infusing.     VSS. Now on 5L via n/c. [facility] made aware.           Will continue to monitor.

## 2024-06-07 NOTE — PROGRESS NOTES
PROGRESS NOTE      Patient Name: Dilip Verma  : 1950  MRN: [id number]  Primary Care Physician: Fernando Camargo DO  Date of admission: 2024    Patient Care Team:  Fernando Camargo DO as PCP - General (Family Medicine)  Morris Cai MD as Consulting Physician (Sleep Medicine)  Michaela Hylton MD as Consulting Physician (Cardiology)  Hardy Banerjee MD as Consulting Physician (Ophthalmology)  Chula Christianson MD as Consulting Physician (Ophthalmology)  Jason Sherman MD (Endocrinology)  Perla Mayen MD as Consulting Physician (Nephrology)  Federioc Hebert MD as Consulting Physician (Pulmonary Disease)  Niraj Ravi MD as Consulting Physician (Orthopedic Surgery)  Papa Velasco MD as Consulting Physician (Urology)  Terese Yost MD as Consulting Physician (Gastroenterology)  Aracelis Ball MD as Consulting Physician (Colon and Rectal Surgery)  [facility] [place] as Primary Care Provider        Reason for Follow up:     AMENA, CKD III      Subjective:     Seen and examined, noted had some agitation and hallucinations overnight, increased O2 requirements, no complaints today    Review of systems:  Constitutional: Seen and examined, alert awake, no distress  HEENT:  No headache, otalgia, itchy eyes, nasal discharge or sore throat.  Cardiac:  No chest pain, dyspnea, orthopnea or PND.  Chest:              No cough, phlegm or wheezing.  Abdomen:  No abdominal pain, nausea or vomiting.  Neuro:  No focal weakness, abnormal movements orseizure like activity.  :   Dysuria  ROS was otherwise negative except as mentioned in the [place].       Personal History:     Past Medical History:   Past Medical History:   Diagnosis Date    AMENA (acute kidney injury) 2020    Alcoholism 1989    not since     CORI positive     Anemia     Anxiety     Ataxia     Atypical chest pain 2022    SEEN AT [facility] ER    Balance disorder     Carotid stenosis 3/28/2024    Cataract   "   BILATERAL, S/P EXTRACTION    Cervical radiculopathy 11/11/2019    SEEN AT  [facility] ER    Cervical spinal cord compression     Chronic diastolic (congestive) heart failure     Chronic kidney disease     STAGE 3, FOLLOWED BY DR. VIVAR    Chronic pancreatitis     Closed left subtrochanteric femur fracture 12/01/2020    ADMITTED TO [facility]    Closed nondisplaced intertrochanteric fracture of left femur 12/01/2020    ADMITTED TO [facility]    Colon polyps     FOLLOWED BY DR. AVTAR JEONG    Constipation     Contracture, right hand     Coronary artery disease     CABG 7/2019    COVID-19 07/2022    DDD (degenerative disc disease), cervical     Diabetes mellitus, type II     IDDM    Diabetic retinopathy     Difficulty walking     Dysphagia     Elevated brain natriuretic peptide (BNP) level 08/2014    Elevated LFTs 03/2021    Erectile dysfunction     Fissure, anal 2022    Foot drop     Fuchs' corneal dystrophy of right eye     Glaucoma     BILATERAL    History of alcohol abuse     Hyperlipidemia     Hypersomnia     Hypertension     Hypertensive urgency 02/25/2020    ADMITTED TO [facility]    Insomnia     Kidney stones     LV dysfunction 06/2016    Lyme disease     Lymphadenopathy syndrome 05/2021    Macular edema     BILATERAL    Myocardial infarction 11/05/2019    NSTEMI, ADMITTED TO [facility]    Myocardial infarction 07/12/2019    NSTEMI, ADMITTED TO [facility]    Neuropathy in diabetes     Non-celiac gluten sensitivity     Orthostasis     Osteoporosis     Oxygen dependent     PAD (peripheral artery disease)     Paroxysmal atrial fibrillation 6/6/2024    PNA (pneumonia) 06/2016    LEFT LOBE    Polyneuropathy     PTSD (post-traumatic stress disorder)     Pulmonary hypertension     Pulmonary nodule     Senile ectropion of both lower eyelids 02/2022    Sepsis 12/16/2020    D/T UTI, ADMITTED TO [facility]    Sleep apnea     STATES DOESN'T USE BIPAP OR CPAP    Spinal stenosis in cervical region     SEVERE-LIMITED ROM    Stroke 2012    \"slight stroke\"    Syncope and " collapse 07/06/2019    ADMITTED TO [facility]    Urinary retention 04/05/2021    SEEN AT [facility] ER    Vision loss     Vitamin D deficiency        Surgical History:      Past Surgical History:   Procedure Laterality Date    CARDIAC CATHETERIZATION N/A 07/15/2019    Procedure: Coronary angiography;  Surgeon: Carrie Price MD;  Location:  [facility];  Service: Cardiovascular    CARDIAC CATHETERIZATION N/A 07/15/2019    Procedure: Left Heart Cath;  Surgeon: Carrie Price MD;  Location:  [facility];  Service: Cardiovascular    CARDIAC CATHETERIZATION N/A 07/15/2019    Procedure: Left ventriculography;  Surgeon: Carrie Price MD;  Location:  [facility];  Service: Cardiovascular    CARDIAC CATHETERIZATION  07/15/2019    Procedure: Functional Flow [place];  Surgeon: Carrie Price MD;  Location:  [facility];  Service: Cardiovascular    CARDIAC CATHETERIZATION N/A 11/06/2019    Procedure: Right and Left Heart Cath;  Surgeon: Mya Smith MD;  Location:  [facility];  Service: Cardiovascular    CARDIAC CATHETERIZATION N/A 11/06/2019    Procedure: Coronary angiography;  Surgeon: Mya Smith MD;  Location:  [facility];  Service: Cardiovascular    CARDIAC SURGERY      CATARACT EXTRACTION Left 2014    CATARACT EXTRACTION Right 11/2016    PHACO/IOL, DR. LEN DENTON    COLONOSCOPY N/A 03/16/2023    ENTIRE COLON WNL, RESCOPE IN 5 YRS, DR. LINDA LIZARRAGA AT [facility]    COLONOSCOPY W/ POLYPECTOMY N/A 01/02/2015    A FEW DIVERTICULA IN SIGMOID, 6 MM TUBULOVILLOUS ADENOMA POLYP IN RECTUM, SMALL HEMORRHOIDS, MELANOSIS COLI, DR. AVTAR JEONG AT [facility]    CORONARY ARTERY BYPASS GRAFT N/A 07/18/2019    Procedure: INTRAOPERATIVE SHOAIB; STERNOTOMY CORONARY ARTERY BYPASS x 3  USING LEFT INTERNAL MAMMARY ARTERY GRAFT UTILIZING ENDOSCOPICALLY HARVESTED RIGHT GREATER SAPHENOUS VEIN AND PRP.;  Surgeon: Bill Devi MD;   Location: [facility] OR;  Service: Cardiothoracic    CYSTOSCOPY BLADDER STONE LITHOTRIPSY N/A     DENTAL PROCEDURE Bilateral     3 surgeries inder implants    FEMUR IM NAILING/RODDING Left 12/03/2020    Procedure: LEFT HIP INTRAMEDULLARY NAIL;  Surgeon: Niraj Ravi MD;  Location: [facility] OR;  Service: Orthopedic Spine;  Laterality: Left;    INCISION AND DRAINAGE PERIRECTAL ABSCESS N/A 10/04/2023    Procedure: Incision and drainage of perianal and buttock abscess;  Surgeon: Herbie Villatoro MD;  Location: [facility] OR;  Service: General;  Laterality: N/A;    INGUINAL HERNIA REPAIR Bilateral     TOENAIL EXCISION  06/2022    TONSILLECTOMY Bilateral     TOTAL HIP ARTHROPLASTY REVISION Left 01/11/2022    Procedure: TOTAL HIP ARTHROPLASTY REVISION- POSTERIOR;  Surgeon: Jurgen Candelaria II, MD;  Location: [facility] OR;  Service: Orthopedics;  Laterality: Left;    VASECTOMY N/A        Family History: family history includes Alcohol abuse in his brother and paternal grandfather; Arrhythmia in his mother; Cancer in his mother; Depression in his mother and sister; Glaucoma in his maternal grandmother; Heart attack in his paternal grandmother; Heart disease in his father and mother; Heart failure in his father; Hyperlipidemia in his father and mother; Hypertension in his father and mother; Kidney disease in his father; Lung disease in his paternal uncle; Mental illness in his mother; Migraines in his mother; Stroke in his maternal grandmother and paternal grandmother; Thyroid disease in his father and paternal uncle; Uterine cancer in his mother. Otherwise pertinent FHx was reviewed and unremarkable.     Social History:  reports that he quit smoking about 24 years ago. His smoking use included cigarettes. He started smoking about 40 years ago. He has a 12 pack-year smoking history. He has been exposed to tobacco smoke. He has never used smokeless tobacco. He reports that he does not currently use  alcohol. He reports that he does not use drugs.    Medications:  Prior to Admission medications    Medication Sig Start Date End Date Taking? Authorizing Provider   aspirin 81 MG EC tablet Take 1 tablet by mouth Every Night.   Yes Heri Rinaldi MD   brimonidine (ALPHAGAN) 0.2 % ophthalmic solution Administer 1 drop to both eyes 2 (Two) Times a Day.   Yes Heri Rinaldi MD   bumetanide (BUMEX) 1 MG tablet Take 1 tablet by mouth Daily.   Yes Heri Rinaldi MD   Cholecalciferol (Vitamin D-3) 125 MCG (5000 UT) tablet Take 1 tablet by mouth Daily.   Yes Heri Rinaldi MD   DULoxetine (CYMBALTA) 30 MG capsule Take 1 capsule by mouth Every Night. 1/16/24  Yes Heri Rinaldi MD   Insulin Glargine, 2 Unit Dial, (TOUJEO) 300 UNIT/ML solution pen-injector injection Inject 24 Units under the skin into the appropriate area as directed Daily.   Yes Heri Rinaldi MD   insulin lispro (humaLOG) 100 UNIT/ML injection Inject 0-14 Units under the skin into the appropriate area as directed 3 (Three) Times a Day Before Meals.  Patient taking differently: Inject 0-14 Units under the skin into the appropriate area as directed 3 (Three) Times a Day Before Meals. SLIDING SCALE  100-150 - 4 units  151-200 - 5 units  201-250 - 6 units  251-300 - 7 units  301-350 - 8 units  351-400 - 9 units  401+ - 10 units 12/7/20  Yes Amador Valverde MD   latanoprost (XALATAN) 0.005 % ophthalmic solution Administer 1 drop to both eyes every night at bedtime. 1/16/23  Yes Heri Rinaldi MD   multivitamin with minerals (MULTIVITAMIN ADULTS PO) Take 1 tablet by mouth Daily.   Yes Heri Rinaldi MD   testosterone (ANDROGEL) 25 MG/2.5GM (1%) gel gel Place 25 mg on the skin as directed by provider 2 (Two) Times a Week.   Yes Heri Rinaldi MD   traMADol (ULTRAM) 50 MG tablet Take 1 tablet by mouth At Night As Needed. 10/16/23  Yes Heri Rinaldi MD   Magnesium 400 MG capsule Take 400 mg by  mouth As Needed.    ProviderHeri MD   sildenafil (REVATIO) 20 MG tablet Take 1 tablet by mouth As Needed.    ProviderHeri MD     Scheduled Meds:amiodarone, 200 mg, Oral, Q24H  apixaban, 5 mg, Oral, Q12H  aspirin, 81 mg, Oral, Daily  brimonidine, 1 drop, Both Eyes, BID  bumetanide, 1 mg, Intravenous, Once  bumetanide, 0.5 mg, Oral, Daily  cefepime, 2,000 mg, Intravenous, Q12H  DULoxetine, 30 mg, Oral, Daily  hydrALAZINE, 25 mg, Oral, Q8H  insulin glargine, 20 Units, Subcutaneous, Daily  insulin lispro, 2-9 Units, Subcutaneous, 4x Daily AC & at Bedtime  latanoprost, 1 drop, Both Eyes, Nightly  metoprolol tartrate, 12.5 mg, Oral, Q12H  OLANZapine, 5 mg, Oral, Nightly  senna-docusate sodium, 2 tablet, Oral, BID  sodium chloride, 10 mL, Intravenous, Q12H  terazosin, 1 mg, Oral, Nightly  traMADol, 50 mg, Oral, Once      Continuous Infusions:     PRN Meds:  acetaminophen **OR** acetaminophen **OR** acetaminophen    senna-docusate sodium **AND** polyethylene glycol **AND** bisacodyl **AND** bisacodyl    dextrose    dextrose    glucagon (human recombinant)    nitroglycerin    OLANZapine    ondansetron    sodium chloride    sodium chloride    traMADol  Allergies:    Allergies   Allergen Reactions    Ace Inhibitors Angioedema    Angiotensin Receptor Blockers Angioedema and Unknown (See Comments)     Angioneurotic edema    Dapagliflozin Other (See Comments)     UTI,  rectal abcess    Losartan Angioedema     Angioneurotic edema    Seroquel [Quetiapine] Hallucinations    Xanax [Alprazolam] Unknown - High Severity    Amlodipine Swelling    Ativan [Lorazepam] Hallucinations    Baclofen Hallucinations    Misc. Sulfonamide Containing Compounds Unknown (See Comments)    Minoxidil Confusion    Tetracycline Rash     bliisters in mouth         Objective   Exam:     Vital Signs  Temp:  [97.7 °F (36.5 °C)-99.3 °F (37.4 °C)] 97.7 °F (36.5 °C)  Heart Rate:  [] 70  Resp:  [18] 18  BP: ()/(63-95) 102/82  SpO2:   [88 %-93 %] 93 %  on  Flow (L/min):  [2-5] 5;   Device (Oxygen Therapy): nasal cannula  Body mass index is 31.02 kg/m².  EXAM  General:  Chronically ill appearing male in no acute distress.    Head:      Normocephalic and atraumatic.    Eyes:      PERRL/EOM intact, conjunctivae and sclerae clear without nystagmus.    Neck:      No masses, thyromegaly,  trachea central   Lungs:    Clear bilaterally to auscultation.    Heart:      Regular rate and rhythm, no murmur no gallop  Abd:        Soft, nontender, not distended, bowel sounds positive, no shifting dullness.  Msk:        No deformity or scoliosis noted of thoracic or lumbar spine.    Pulses:   Pulses normal in all 4 extremities.    Extremities:        No cyanosis or clubbing- no edema.    Neuro:    No focal deficits.   alert oriented x3  Skin:       Intact without lesions or rashes.    Psych:    Alert and cooperative; normal mood and affect; normal attention span       Results Review:  I have personally reviewed most recent Data :  [facility](creatinine:10)  CBC    Results from last 7 days   Lab Units 06/06/24  0620 06/03/24  0608 06/02/24  0752 06/01/24  0551   WBC 10*3/mm3 10.27 7.71 7.94 8.26   HEMOGLOBIN g/dL 12.9* 13.1 13.0 13.2   PLATELETS 10*3/mm3 261 195 204 168     CMP   Results from last 7 days   Lab Units 06/07/24  0651 06/06/24  1715 06/06/24  0826 06/06/24  0620 06/05/24  0654 06/04/24  0741 06/03/24  0608 06/02/24  0752   SODIUM mmol/L 140 142 141 140 136 140 140 141   POTASSIUM mmol/L 3.8 3.9 4.0 4.1 3.9 4.1 3.9 4.2   CHLORIDE mmol/L 107 106 106 106 102 106 105 107   CO2 mmol/L 21.3* 20.3* 22.7 21.2* 22.1 25.0 23.1 24.0   BUN mg/dL 28* 29* 28* 27* 24* 17 17 23   CREATININE mg/dL 1.48* 1.61* 1.83* 1.78* 1.45* 1.29* 1.07 0.94   GLUCOSE mg/dL 119* 78 76 79 108* 129* 105* 74   ALBUMIN g/dL  --   --   --   --   --   --  3.2* 3.2*     ABG          US Renal Bilateral    Result Date: 5/31/2024  Thickened urinary bladder wall. Low volume bladder. No  hydronephrosis.  This report was finalized on 5/31/2024 6:25 PM by Dr. Zechariah Falcon M.D on Workstation: [id number]       Results for orders placed during the hospital encounter of 05/30/24    Adult Transthoracic Echo Complete W/ Cont if Necessary Per Protocol    Interpretation Summary    Left ventricular systolic function is hyperdynamic (EF > 70%). Calculated left ventricular EF = 75% Global longitudinal LV strain (GLS) = -17.8%. Left ventricle strain data was reviewed by the physician and found to be accurate. Global longitudinal LV strain is normal however there is regional abnormality with apical sparing suggestive of amyloid heart disease. However there is also basal ptal hypertrophy suggestive of hypertrophic cardiomyopathy. Wall motion abnormality is also noted in the basal septum and cannot rule out ischemic component.Consider additional imaging such as cardiac MRI and further evaluation also for amyloid heart disease as clinically indicated. The left ventricular cavity is small in size. Left ventricular wall thickness is consistent with moderate to severe septal asymmetric hypertrophy. There is hypokinesis of the left ventricular basal septum. Left ventricular diastolic function is consistent with (grade II w/high LAP) pseudonormalization.    The left atrial cavity is mildly dilated.    No aortic valve regurgitation or stenosis is present. The aortic valve is abnormal in structure. There is mild thickening of the aortic valve.    There is mild, bileaflet mitral valve thickening present. Trace mitral valve regurgitation is present. No significant mitral valve stenosis is present.    Moderate tricuspid valve regurgitation is present. Estimated right ventricular systolic pressure from tricuspid regurgitation is markedly elevated (>55 mmHg). Calculated right ventricular systolic pressure from tricuspid regurgitation is 74 mmHg.        Assessment & Plan   Assessment and Plan:         Acute UTI (urinary tract  infection)    Type 2 diabetes mellitus with hyperglycemia, with long-term current use of insulin    CAD (coronary artery disease)    Hx of CABG    Chronic diastolic heart failure    CSA (central sleep apnea)    HTN (hypertension)    History of stroke    CKD (chronic kidney disease) stage 3, GFR 30-59 ml/min    Peripheral arterial disease    Sepsis    Hyperkalemia    Metabolic encephalopathy    AMENA (acute kidney injury)    Paroxysmal atrial fibrillation    ASSESSMENT:  AMENA likely prerenal ATN 2/2 urosepsis with hemodynamic changes; on CKD III etiology likely diabetic and hypertensive nephropathy with baseline sCr ~0.9-1.2 mg/dL  Hyperkalemia, now resolved  UTI, urine cultures with gram neg bacilli  Leukocytosis  Metabolic encephalopathy  PAD  Hx stroke  HTN  CHF  CAD  DM2  Chronic splenic vein thrombosis  Possible hepatocellular disease/cirrhosis  Likely benign subpleural nodule with bilateral lymphadenopathy  Hiatal hernia    Last TTE 10/5/22 with EF 66%, grade II DD    PLAN :     AMENA likely prerenal ATN 2/2 urosepsis with hemodynamic changes; on CKD III etiology likely diabetic and hypertensive nephropathy with baseline sCr ~0.9-1.2 mg/dL  Renal function improved today, sCr 1.48, O2 requirements increased, noted bumex 1mg IV x 1 given per primary, CXR with vascular crowding  Slightly confused no significant edema at this time   Bumex 0.5 mg po daily  Electrolytes, acid/base acceptable  Followed by infectious disease, evaluation noted, appreciate assistance.  BP trends acceptable  Cardiology following for new onset A-fib, evaluation noted  Avoid NSAIDs, nephrotoxic agents  We will follow and coordinate with team  Overall stable from renal standpoint  Discussed with family    Note transcribed for [facility]  6/7/2024  14:40 EDT          data, problems, assessment and plan with the nurse practitioner during rounds and I concur with the history, exam, assessment and plan as described in the progress note with comments additions, revisions as noted.           Cristóbal Mayen MD    [facility]

## 2024-06-07 NOTE — PLAN OF CARE
Goal Outcome Evaluation:  Plan of Care Reviewed With: patient, spouse           Outcome Evaluation: Pt very confused today, not following any commands, flailing about in the bed, soft wrist restraints on. spouse feeding patient in supine position when entered room, educated about raising head of bed if patient eating or drinking. Pt required increased assistance with all mobility today, up to EOB with maxAx2, pt not following any commands, attempted standing but patient has no intiation and continues to flail about on EOB. Returned to supine, maxAx2 to reposition and reapply soft wrist restraints.

## 2024-06-07 NOTE — THERAPY TREATMENT NOTE
Patient Name: Dilip Verma  : 1950    MRN: [id number]                              Today's Date: 2024       Admit Date: 2024    Visit Dx:     ICD-10-CM ICD-9-CM   1. Acute respiratory failure with hypoxia  J96.01 518.81   2. Sepsis without acute organ dysfunction, due to unspecified organism  A41.9 038.9     995.91   3. Hyperkalemia  E87.5 276.7   4. Metabolic encephalopathy  G93.41 348.31   5. Acute UTI  N39.0 599.0   6. Hyperglycemia due to diabetes mellitus  E11.65 250.02   7. Chronic heart failure with preserved ejection fraction (HFpEF)  I50.32 428.9   8. Acute UTI (urinary tract infection)  N39.0 599.0     Patient Active Problem List   Diagnosis    Anxiety    Colon polyp    Type 2 diabetes mellitus with hyperglycemia, with long-term current use of insulin    Erectile dysfunction    Hyperlipidemia    Type 2 acute myocardial infarction    CAD (coronary artery disease)    Hx of CABG    Chronic diastolic heart failure    Hypersomnia due to medical condition    CSA (central sleep apnea)    Periodic breathing    HTN (hypertension)    History of stroke    CKD (chronic kidney disease) stage 3, GFR 30-59 ml/min    Urinary retention    Acute on chronic renal failure    Acute UTI (urinary tract infection)    Acute on chronic diastolic (congestive) heart failure    Bacteriuria    Rectal pain    Status post total replacement of hip    Vitamin D deficiency    Post-acute COVID-19 syndrome    Insulin dose changed    Arthropathy associated with neurological disorder    Personal history of colonic polyps    Type 2 diabetes mellitus with diabetic neuropathy, with long-term current use of insulin    Cervical spinal stenosis    Abscess of skin    Overweight    Cervical stenosis of spine    Spinal stenosis, lumbar region, without neurogenic claudication    Medically noncompliant    Chronic heart failure with preserved ejection fraction (HFpEF)    Carotid stenosis    Peripheral arterial disease    Sepsis     Hyperkalemia    Metabolic encephalopathy    AMENA (acute kidney injury)    Paroxysmal atrial fibrillation     Past Medical History:   Diagnosis Date    AMENA (acute kidney injury) 12/03/2020    Alcoholism 1989    not since 1998    CORI positive     Anemia     Anxiety     Ataxia     Atypical chest pain 04/19/2022    SEEN AT [facility] ER    Balance disorder     Carotid stenosis 3/28/2024    Cataract     BILATERAL, S/P EXTRACTION    Cervical radiculopathy 11/11/2019    SEEN AT  [facility] ER    Cervical spinal cord compression     Chronic diastolic (congestive) heart failure     Chronic kidney disease     STAGE 3, FOLLOWED BY DR. VIVAR    Chronic pancreatitis     Closed left subtrochanteric femur fracture 12/01/2020    ADMITTED TO [facility]    Closed nondisplaced intertrochanteric fracture of left femur 12/01/2020    ADMITTED TO [facility]    Colon polyps     FOLLOWED BY DR. AVTAR JEONG    Constipation     Contracture, right hand     Coronary artery disease     CABG 7/2019    COVID-19 07/2022    DDD (degenerative disc disease), cervical     Diabetes mellitus, type II     IDDM    Diabetic retinopathy     Difficulty walking     Dysphagia     Elevated brain natriuretic peptide (BNP) level 08/2014    Elevated LFTs 03/2021    Erectile dysfunction     Fissure, anal 2022    Foot drop     Fuchs' corneal dystrophy of right eye     Glaucoma     BILATERAL    History of alcohol abuse     Hyperlipidemia     Hypersomnia     Hypertension     Hypertensive urgency 02/25/2020    ADMITTED TO [facility]    Insomnia     Kidney stones     LV dysfunction 06/2016    Lyme disease     Lymphadenopathy syndrome 05/2021    Macular edema     BILATERAL    Myocardial infarction 11/05/2019    NSTEMI, ADMITTED TO [facility]    Myocardial infarction 07/12/2019    NSTEMI, ADMITTED TO [facility]    Neuropathy in diabetes     Non-celiac gluten sensitivity     Orthostasis     Osteoporosis     Oxygen dependent     PAD (peripheral artery disease)     Paroxysmal atrial fibrillation 6/6/2024    PNA (pneumonia)  "06/2016    LEFT LOBE    Polyneuropathy     PTSD (post-traumatic stress disorder)     Pulmonary hypertension     Pulmonary nodule     Senile ectropion of both lower eyelids 02/2022    Sepsis 12/16/2020    D/T UTI, ADMITTED TO [facility]    Sleep apnea     STATES DOESN'T USE BIPAP OR CPAP    Spinal stenosis in cervical region     SEVERE-LIMITED ROM    Stroke 2012    \"slight stroke\"    Syncope and collapse 07/06/2019    ADMITTED TO [place]    Urinary retention 04/05/2021    SEEN AT [facility] ER    Vision loss     Vitamin D deficiency      Past Surgical History:   Procedure Laterality Date    CARDIAC CATHETERIZATION N/A 07/15/2019    Procedure: Coronary angiography;  Surgeon: Carrie Price MD;  Location:  [facility];  Service: Cardiovascular    CARDIAC CATHETERIZATION N/A 07/15/2019    Procedure: Left Heart Cath;  Surgeon: Carrie Price MD;  Location:  [facility];  Service: Cardiovascular    CARDIAC CATHETERIZATION N/A 07/15/2019    Procedure: Left ventriculography;  Surgeon: Carrie Price MD;  Location:  [facility];  Service: Cardiovascular    CARDIAC CATHETERIZATION  07/15/2019    Procedure: Functional Flow [place];  Surgeon: Carrie Price MD;  Location:  [facility];  Service: Cardiovascular    CARDIAC CATHETERIZATION N/A 11/06/2019    Procedure: Right and Left Heart Cath;  Surgeon: Mya Smith MD;  Location:  [facility];  Service: Cardiovascular    CARDIAC CATHETERIZATION N/A 11/06/2019    Procedure: Coronary angiography;  Surgeon: Mya Smith MD;  Location:  [facility];  Service: Cardiovascular    CARDIAC SURGERY      CATARACT EXTRACTION Left 2014    CATARACT EXTRACTION Right 11/2016    PHACO/IOL, DR. LEN DENTON    COLONOSCOPY N/A 03/16/2023    ENTIRE COLON WNL, RESCOPE IN 5 YRS, DR. LINDA LIZARRAGA AT [facility]    COLONOSCOPY W/ POLYPECTOMY N/A 01/02/2015    A FEW DIVERTICULA IN SIGMOID, 6 MM TUBULOVILLOUS " ADENOMA POLYP IN RECTUM, SMALL HEMORRHOIDS, MELANOSIS COLI, DR. AVTAR JEONG AT [facility]    CORONARY ARTERY BYPASS GRAFT N/A 07/18/2019    Procedure: INTRAOPERATIVE SHOAIB; STERNOTOMY CORONARY ARTERY BYPASS x 3  USING LEFT INTERNAL MAMMARY ARTERY GRAFT UTILIZING ENDOSCOPICALLY HARVESTED RIGHT GREATER SAPHENOUS VEIN AND PRP.;  Surgeon: Bill Devi MD;  Location: [facility] OR;  Service: Cardiothoracic    CYSTOSCOPY BLADDER STONE LITHOTRIPSY N/A     DENTAL PROCEDURE Bilateral     3 surgeries inder implants    FEMUR IM NAILING/RODDING Left 12/03/2020    Procedure: LEFT HIP INTRAMEDULLARY NAIL;  Surgeon: Niraj Ravi MD;  Location: [facility] OR;  Service: Orthopedic Spine;  Laterality: Left;    INCISION AND DRAINAGE PERIRECTAL ABSCESS N/A 10/04/2023    Procedure: Incision and drainage of perianal and buttock abscess;  Surgeon: Herbie Villatoro MD;  Location: [facility] OR;  Service: General;  Laterality: N/A;    INGUINAL HERNIA REPAIR Bilateral     TOENAIL EXCISION  06/2022    TONSILLECTOMY Bilateral     TOTAL HIP ARTHROPLASTY REVISION Left 01/11/2022    Procedure: TOTAL HIP ARTHROPLASTY REVISION- POSTERIOR;  Surgeon: Jurgen Candelaria II, MD;  Location: [facility] OR;  Service: Orthopedics;  Laterality: Left;    VASECTOMY N/A       General Information       Row Name 06/07/24 1502          Physical Therapy Time and Intention    Document Type therapy note (daily note)  -EM     Mode of Treatment individual therapy;physical therapy  -EM       Row Name 06/07/24 1502          General Information    Existing Precautions/Restrictions fall  -EM     Barriers to Rehab cognitive status  -EM               User Key  (r) = Recorded By, (t) = Taken By, (c) = Cosigned By      Initials Name Provider Type    EM Nila Toro PT Physical Therapist                   Mobility       Row Name 06/07/24 1502          Bed Mobility    Supine-Sit [place] (Bed Mobility) maximum assist (25% patient  effort);2 person assist  -EM     Sit-Supine [place] (Bed Mobility) maximum assist (25% patient effort);2 person assist  -EM     Comment, (Bed Mobility) pt confused, resistive, flailing about, not following commands  -EM       Row Name 06/07/24 1502          Sit-Stand Transfer    Sit-Stand [place] (Transfers) maximum assist (25% patient effort);2 person assist  -EM     Assistive Device (Sit-Stand Transfers) walker, front-wheeled  -EM     Comment, (Sit-Stand Transfer) unable to stand today on 2 attempts, pt has no initiation, scooting forward too close to EOB, unable to keep either hand up on rwx, not following any commands  -EM       Row Name 06/07/24 1502          Gait/Stairs (Locomotion)    Comment, (Gait/Stairs) unable to ambulate today, could not achieve standing  -EM               User Key  (r) = Recorded By, (t) = Taken By, (c) = Cosigned By      Initials Name Provider Type    Nila Bella PT Physical Therapist                   Obj/Interventions       Row Name 06/07/24 1505          Balance    Static Sitting Balance moderate assist  -EM     Dynamic Sitting Balance moderate assist;maximum assist  -EM     Position, Sitting Balance sitting edge of bed  -EM               User Key  (r) = Recorded By, (t) = Taken By, (c) = Cosigned By      Initials Name Provider Type    Nila Bella PT Physical Therapist                   Goals/Plan       Row Name 06/07/24 1513          Bed Mobility Goal 1 (PT)    Activity/Assistive Device (Bed Mobility Goal 1, PT) bed mobility activities, all  -EM     [place] Level/Cues Needed (Bed Mobility Goal 1, PT) minimum assist (75% or more patient effort)  -EM     Time Frame (Bed Mobility Goal 1, PT) 1 week  -EM     Progress/Outcomes (Bed Mobility Goal 1, PT) goal ongoing  -EM       Row Name 06/07/24 1513          Transfer Goal 1 (PT)    Activity/Assistive Device (Transfer Goal 1, PT) sit-to-stand/stand-to-sit  -EM     [place] Level/Cues Needed  (Transfer Goal 1, PT) minimum assist (75% or more patient effort)  -EM     Time Frame (Transfer Goal 1, PT) 1 week  -EM     Progress/Outcome (Transfer Goal 1, PT) goal ongoing  -EM       Row Name 06/07/24 1513          Gait Training Goal 1 (PT)    Activity/Assistive Device (Gait Training Goal 1, PT) gait (walking locomotion);walker, rolling  -EM     [place] Level (Gait Training Goal 1, PT) minimum assist (75% or more patient effort)  -EM     Distance (Gait Training Goal 1, PT) 20  -EM     Time Frame (Gait Training Goal 1, PT) 1 week  -EM     Progress/Outcome (Gait Training Goal 1, PT) goal ongoing  -EM               User Key  (r) = Recorded By, (t) = Taken By, (c) = Cosigned By      Initials Name Provider Type    EM Nila Toro, PT Physical Therapist                   Clinical Impression       Row Name 06/07/24 1508          Pain    Pre/Posttreatment Pain Comment unable to assess, pt very confused today  -EM       Row Name 06/07/24 1508          Plan of Care Review    Plan of Care Reviewed With patient;spouse  -EM     Outcome Evaluation Pt very confused today, not following any commands, flailing about in the bed, soft wrist restraints on. spouse feeding patient in supine position when entered room, educated about raising head of bed if patient eating or drinking. Pt required increased assistance with all mobility today, up to EOB with maxAx2, pt not following any commands, attempted standing but patient has no intiation and continues to flail about on EOB. Returned to supine, maxAx2 to reposition and reapply soft wrist restraints.  -EM       Row Name 06/07/24 1508          Therapy Assessment/Plan (PT)    Therapy Frequency (PT) 5 times/wk  -EM       Row Name 06/07/24 1508          Positioning and Restraints    Pre-Treatment Position in bed  -EM     Post Treatment Position bed  -EM     In Bed sitting;with family/caregiver;notified nsg  -EM               User Key  (r) = Recorded By, (t) = Taken By, (c) =  Cosigned By      Initials Name Provider Type    Nila Bella PT Physical Therapist                   Outcome Measures       Row Name 06/07/24 1511          How much help from another person do you currently need...    Turning from your back to your side while in flat bed without using bedrails? 2  -EM     Moving from lying on back to sitting on the side of a flat bed without bedrails? 2  -EM     Moving to and from a bed to a chair (including a wheelchair)? 1  -EM     Standing up from a chair using your arms (e.g., wheelchair, bedside chair)? 1  -EM     Climbing 3-5 steps with a railing? 1  -EM     To walk in hospital room? 1  -EM     AM-PAC 6 Clicks Score (PT) 8  -EM     Highest Level of Mobility Goal 3 --> Sit at edge of bed  -EM               User Key  (r) = Recorded By, (t) = Taken By, (c) = Cosigned By      Initials Name Provider Type    Nila Bella PT Physical Therapist                                 Physical Therapy Education       Title: PT OT SLP Therapies (In Progress)       Topic: Physical Therapy (In Progress)       Point: Mobility training (In Progress)       Learning Progress Summary             Patient Acceptance, E, NR by EM at 6/7/2024 1511    Acceptance, E,D, DU by PC at 6/5/2024 1518    Acceptance, E,D, DU,NR by PC at 6/4/2024 1557    Acceptance, E,D, DU by PC at 6/2/2024 1427    Acceptance, E,TB,D, VU by CB at 6/1/2024 2218    Acceptance, E,D, DU by PC at 5/31/2024 1612   Family Acceptance, E,TB,D, VU by CB at 6/1/2024 2218   Significant Other Acceptance, E, NR by EM at 6/7/2024 1511                         Point: Home exercise program (Done)       Learning Progress Summary             Patient Acceptance, E,D, DU by PC at 6/5/2024 1518    Acceptance, E,D, DU,NR by PC at 6/4/2024 1557    Acceptance, E,D, DU by PC at 6/2/2024 1427    Acceptance, E,TB,D, VU by CB at 6/1/2024 2218    Acceptance, E,D, DU by PC at 5/31/2024 1612   Family Acceptance, E,TB,MARY ANN, VU by CB at 6/1/2024  2218                         Point: Body mechanics (Done)       Learning Progress Summary             Patient Acceptance, E,D, DU by PC at 6/5/2024 1518    Acceptance, E,D, DU,NR by PC at 6/4/2024 1557    Acceptance, E,D, DU by PC at 6/2/2024 1427    Acceptance, E,TB,D, VU by CB at 6/1/2024 2218    Acceptance, E,D, DU by PC at 5/31/2024 1612   Family Acceptance, E,TB,D, VU by CB at 6/1/2024 2218                         Point: Precautions (Done)       Learning Progress Summary             Patient Acceptance, E,D, DU by PC at 6/5/2024 1518    Acceptance, E,D, DU,NR by PC at 6/4/2024 1557    Acceptance, E,D, DU by PC at 6/2/2024 1427    Acceptance, E,TB,D, VU by CB at 6/1/2024 2218    Acceptance, E,D, DU by PC at 5/31/2024 1612   Family Acceptance, E,TB,D, VU by CB at 6/1/2024 2218                                         User Key       Initials Effective Dates Name Provider Type Discipline    PC 06/16/21 -  Gemma Hamilton, PT Physical Therapist PT    EM 06/16/21 -  Nila Toro PT Physical Therapist PT    CB 02/21/24 -  Murphy Herndon, RN Registered Nurse Nurse                  PT Recommendation and Plan     Plan of Care Reviewed With: patient, spouse  Outcome Evaluation: Pt very confused today, not following any commands, flailing about in the bed, soft wrist restraints on. spouse feeding patient in supine position when entered room, educated about raising head of bed if patient eating or drinking. Pt required increased assistance with all mobility today, up to EOB with maxAx2, pt not following any commands, attempted standing but patient has no intiation and continues to flail about on EOB. Returned to supine, maxAx2 to reposition and reapply soft wrist restraints.     Time Calculation:         PT Charges       Row Name 06/07/24 1512             Time Calculation    Start Time 1325  -EM      Stop Time 1344  -EM      Time Calculation (min) 19 min  -EM      PT Received On 06/07/24  -EM      PT - Next Appointment  06/10/24  -EM      PT Goal Re-Cert Due Date 06/14/24  -EM         Time Calculation- PT    Total Timed Code Minutes- PT 19 minute(s)  -EM         Timed Charges    67989 - PT Therapeutic Activity Minutes 19  -EM         Total Minutes    Timed Charges Total Minutes 19  -EM       Total Minutes 19  -EM                User Key  (r) = Recorded By, (t) = Taken By, (c) = Cosigned By      Initials Name Provider Type    EM Nila Toro PT Physical Therapist                  Therapy Charges for Today       Code Description Service Date Service Provider Modifiers Qty    26181753790  PT THERAPEUTIC ACT EA 15 MIN 6/7/2024 Nila Toro, PT GP 1    92387740952 HC PT THER SUPP EA 15 MIN 6/7/2024 Nila Toro, PT GP 1            PT G-Codes  Outcome Measure Options: AM-PAC 6 Clicks Daily Activity (OT)  AM-PAC 6 Clicks Score (PT): 8  AM-PAC 6 Clicks Score (OT): 15       Nila Toro PT  6/7/2024

## 2024-06-07 NOTE — PROGRESS NOTES
"  Infectious Diseases Progress Note    Tino Nagel MD     [facility]  [place]: 8 days  Patient Identification:  Name: Dilip Verma  Age: 74 y.o.  Sex: male  :  1950  MRN: [id number]         Primary Care Physician: Fernando Caamrgo, DO        Subjective: Having episodes of agitation and confusion.  Interval History: See consultation note.  2024 MRSA screen is negative.  Objective:    Scheduled Meds:amiodarone, 200 mg, Oral, Q24H  apixaban, 5 mg, Oral, Q12H  aspirin, 81 mg, Oral, Daily  brimonidine, 1 drop, Both Eyes, BID  bumetanide, 0.5 mg, Oral, Daily  cefepime, 2,000 mg, Intravenous, Q12H  DULoxetine, 30 mg, Oral, Daily  hydrALAZINE, 25 mg, Oral, Q8H  insulin glargine, 20 Units, Subcutaneous, Daily  insulin lispro, 2-9 Units, Subcutaneous, 4x Daily AC & at Bedtime  latanoprost, 1 drop, Both Eyes, Nightly  metoprolol tartrate, 12.5 mg, Oral, Q12H  OLANZapine, 5 mg, Oral, Nightly  senna-docusate sodium, 2 tablet, Oral, BID  sodium chloride, 10 mL, Intravenous, Q12H  terazosin, 1 mg, Oral, Nightly  traMADol, 50 mg, Oral, Once      Continuous Infusions:       Vital signs in last 24 hours:  Temp:  [97.7 °F (36.5 °C)-99.3 °F (37.4 °C)] 98.1 °F (36.7 °C)  Heart Rate:  [] 102  Resp:  [18] 18  BP: ()/(63-95) 194/72    Intake/Output:    Intake/Output Summary (Last 24 hours) at 2024 0918  Last data filed at 2024 0233  Gross per 24 hour   Intake --   Output 1300 ml   Net -1300 ml         Exam:  BP (!) 194/72 (BP Location: Right arm, Patient Position: Lying) Comment: nurse notified  Pulse 102   Temp 98.1 °F (36.7 °C) (Oral)   Resp 18   Ht 172.7 cm (68\")   Wt 92.5 kg (204 lb)   SpO2 91%   BMI 31.02 kg/m²   Patient is examined using the personal protective equipment as per guidelines from infection control for this particular patient as enacted.  Hand washing was performed before and after patient interaction.  General Appearance: Confused                          Head:    " Normocephalic, without obvious abnormality, atraumatic                           Eyes:    PERRL, conjunctivae/corneas clear, EOM's intact, both eyes                         Throat:   Lips, tongue, gums normal; oral mucosa pink and moist                           Neck:   Supple, symmetrical, trachea midline, no JVD                         Lungs:    Clear to auscultation bilaterally, respirations unlabored                 Chest Wall:    No tenderness or deformity                          Heart:  S1-S2 regular                  Abdomen:   Soft nontender                 Extremities: left lower extremity is wrapped with residual edema of the left foot                        Pulses:   Pulses palpable in all extremities                            Skin:   Skin is warm and dry,  no rashes or palpable lesions                  Neurologic: No acute distress       Data Review:    I reviewed the patient's new clinical results.  Results from last 7 days   Lab Units 06/06/24  0620 06/03/24  0608 06/02/24  0752 06/01/24  0551   WBC 10*3/mm3 10.27 7.71 7.94 8.26   HEMOGLOBIN g/dL 12.9* 13.1 13.0 13.2   PLATELETS 10*3/mm3 261 195 204 168     Results from last 7 days   Lab Units 06/07/24  0651 06/06/24  1715 06/06/24  0826 06/06/24  0620 06/05/24  0654 06/04/24  0741 06/03/24  0608   SODIUM mmol/L 140 142 141 140 136 140 140   POTASSIUM mmol/L 3.8 3.9 4.0 4.1 3.9 4.1 3.9   CHLORIDE mmol/L 107 106 106 106 102 106 105   CO2 mmol/L 21.3* 20.3* 22.7 21.2* 22.1 25.0 23.1   BUN mg/dL 28* 29* 28* 27* 24* 17 17   CREATININE mg/dL 1.48* 1.61* 1.83* 1.78* 1.45* 1.29* 1.07   CALCIUM mg/dL 8.5* 8.6 8.7 8.6 8.3* 8.3* 8.6   GLUCOSE mg/dL 119* 78 76 79 108* 129* 105*     Microbiology Results (last 10 days)       Procedure Component Value - Date/Time    MRSA Screen, PCR (Inpatient) - Swab, Nares [[id number]]  (Normal) Collected: 06/04/24 0952    Lab Status: Final result Specimen: Swab from Nares Updated: 06/04/24 1112     MRSA PCR No MRSA Detected     Narrative:      The negative predictive value of this diagnostic test is high and should only be used to consider de-escalating anti-MRSA therapy. A positive result may indicate colonization with MRSA and must be correlated clinically.    Eosinophil Smear - Urine, Urine, Clean Catch [[id number]]  (Normal) Collected: 05/31/24 1614    Lab Status: Final result Specimen: Urine, Clean Catch Updated: 05/31/24 1745     Eosinophil Smear 0 % EOS/100 Cells     Urine Culture - Urine, Urine, Clean Catch [[id number]]  (Abnormal)  (Susceptibility) Collected: 05/30/24 0538    Lab Status: Final result Specimen: Urine, Clean Catch Updated: 06/02/24 0935     Urine Culture 50,000 CFU/mL Serratia marcescens    Narrative:      Colonization of the urinary tract without infection is common. Treatment is discouraged unless the patient is symptomatic, pregnant, or undergoing an invasive urologic procedure.    Susceptibility        Serratia marcescens      KAT Not Specified      Amoxicillin + Clavulanate Resistant       Cefazolin Resistant       Cefepime Susceptible       Ceftazidime Susceptible       Ceftriaxone Susceptible       Gentamicin Susceptible       Levofloxacin Susceptible       Nitrofurantoin Resistant       Piperacillin + Tazobactam  Susceptible      Trimethoprim + Sulfamethoxazole Susceptible                          Susceptibility Comments       Serratia marcescens    Pip/patsy confirmed by disk diffusion.               Blood Culture - Blood, Arm, Left [[id number]]  (Normal) Collected: 05/30/24 0441    Lab Status: Final result Specimen: Blood from Arm, Left Updated: 06/04/24 0500     Blood Culture No growth at 5 days    Narrative:      Less than seven (7) mL's of blood was collected.  Insufficient quantity may yield false negative results.    Blood Culture - Blood, Arm, Left [[id number]]  (Normal) Collected: 05/30/24 0433    Lab Status: Final result Specimen: Blood from Arm, Left Updated: 06/04/24 0445     Blood Culture No growth at  5 days            Assessment:    Acute UTI (urinary tract infection)    Type 2 diabetes mellitus with hyperglycemia, with long-term current use of insulin    CAD (coronary artery disease)    Hx of CABG    Chronic diastolic heart failure    CSA (central sleep apnea)    HTN (hypertension)    History of stroke    CKD (chronic kidney disease) stage 3, GFR 30-59 ml/min    Peripheral arterial disease    Sepsis    Hyperkalemia    Metabolic encephalopathy    AMENA (acute kidney injury)    Paroxysmal atrial fibrillation  1-metabolic encephalopathy due to  2-systemic infection as a result of urinary tract infection as well as evolving sepsis traumatic cellulitis of the left leg.  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     Recommendations/Discussions:  He is doing very well in terms of his symptoms such as dysuria and discomfort in his left leg.  His mental status is also significantly improved.  Vancomycin discontinued on 6/5/2024.  Continue with IV cefepime while he is here to address both traumatic cellulitis as well as Serratia marcescens UTI and when considered stable and improved can be switched to combination of ciprofloxacin and Augmentin to complete the 10-day course of treatment.  Bactrim is not an option given the renal insufficiency.  Side effects of antibiotic therapy that could occur discussed with the patient's wife at the bedside.  Patient would need continued elevation and wrapping of left lower extremity until edema is resolved and may benefit from out of hospital wound care follow-ups if concern for secondary infection of the wound still persists.  Tino Nagel MD  6/7/2024  09:18 EDT    Parts of this note may be an  electronic transcription/translation of spoken language to printed text using the Dragon dictation system.

## 2024-06-08 LAB
ANION GAP SERPL CALCULATED.3IONS-SCNC: 12.6 MMOL/L (ref 5–15)
BUN SERPL-MCNC: 24 MG/DL (ref 8–23)
BUN/CREAT SERPL: 15.6 (ref 7–25)
CALCIUM SPEC-SCNC: 8.8 MG/DL (ref 8.6–10.5)
CHLORIDE SERPL-SCNC: 110 MMOL/L (ref 98–107)
CO2 SERPL-SCNC: 23.4 MMOL/L (ref 22–29)
CREAT SERPL-MCNC: 1.54 MG/DL (ref 0.76–1.27)
EGFRCR SERPLBLD CKD-EPI 2021: 46.7 ML/MIN/1.73
GLUCOSE BLDC GLUCOMTR-MCNC: 115 MG/DL (ref 70–130)
GLUCOSE BLDC GLUCOMTR-MCNC: 135 MG/DL (ref 70–130)
GLUCOSE BLDC GLUCOMTR-MCNC: 60 MG/DL (ref 70–130)
GLUCOSE BLDC GLUCOMTR-MCNC: 70 MG/DL (ref 70–130)
GLUCOSE BLDC GLUCOMTR-MCNC: 77 MG/DL (ref 70–130)
GLUCOSE BLDC GLUCOMTR-MCNC: 98 MG/DL (ref 70–130)
GLUCOSE SERPL-MCNC: 76 MG/DL (ref 65–99)
POTASSIUM SERPL-SCNC: 3.5 MMOL/L (ref 3.5–5.2)
QT INTERVAL: 450 MS
QTC INTERVAL: 496 MS
SODIUM SERPL-SCNC: 146 MMOL/L (ref 136–145)

## 2024-06-08 PROCEDURE — 25010000002 ENOXAPARIN PER 10 MG: Performed by: INTERNAL MEDICINE

## 2024-06-08 PROCEDURE — 25010000002 THIAMINE HCL 200 MG/2ML SOLUTION: Performed by: INTERNAL MEDICINE

## 2024-06-08 PROCEDURE — 25010000002 BUMETANIDE PER 0.5 MG: Performed by: INTERNAL MEDICINE

## 2024-06-08 PROCEDURE — 80048 BASIC METABOLIC PNL TOTAL CA: CPT

## 2024-06-08 PROCEDURE — 0 DEXTROSE 5 % SOLUTION: Performed by: INTERNAL MEDICINE

## 2024-06-08 PROCEDURE — 25010000002 CEFEPIME PER 500 MG: Performed by: INTERNAL MEDICINE

## 2024-06-08 PROCEDURE — 82948 REAGENT STRIP/BLOOD GLUCOSE: CPT

## 2024-06-08 RX ORDER — DEXTROSE MONOHYDRATE 50 MG/ML
75 INJECTION, SOLUTION INTRAVENOUS CONTINUOUS
Status: DISCONTINUED | OUTPATIENT
Start: 2024-06-08 | End: 2024-06-11

## 2024-06-08 RX ORDER — BUMETANIDE 0.25 MG/ML
0.5 INJECTION INTRAMUSCULAR; INTRAVENOUS DAILY
Status: DISCONTINUED | OUTPATIENT
Start: 2024-06-08 | End: 2024-06-10

## 2024-06-08 RX ORDER — ENOXAPARIN SODIUM 100 MG/ML
1 INJECTION SUBCUTANEOUS 2 TIMES DAILY
Status: DISCONTINUED | OUTPATIENT
Start: 2024-06-08 | End: 2024-06-13

## 2024-06-08 RX ORDER — THIAMINE HYDROCHLORIDE 100 MG/ML
100 INJECTION, SOLUTION INTRAMUSCULAR; INTRAVENOUS DAILY
Status: DISCONTINUED | OUTPATIENT
Start: 2024-06-08 | End: 2024-06-20

## 2024-06-08 RX ADMIN — DEXTROSE MONOHYDRATE 75 ML/HR: 50 INJECTION, SOLUTION INTRAVENOUS at 17:18

## 2024-06-08 RX ADMIN — DEXTROSE MONOHYDRATE 25 G: 25 INJECTION, SOLUTION INTRAVENOUS at 09:20

## 2024-06-08 RX ADMIN — SENNOSIDES AND DOCUSATE SODIUM 2 TABLET: 50; 8.6 TABLET ORAL at 21:35

## 2024-06-08 RX ADMIN — HYDRALAZINE HYDROCHLORIDE 25 MG: 25 TABLET ORAL at 21:34

## 2024-06-08 RX ADMIN — LATANOPROST 1 DROP: 50 SOLUTION OPHTHALMIC at 21:36

## 2024-06-08 RX ADMIN — BUMETANIDE 0.5 MG: 0.25 INJECTION INTRAMUSCULAR; INTRAVENOUS at 17:26

## 2024-06-08 RX ADMIN — CEFEPIME 2000 MG: 2 INJECTION, POWDER, FOR SOLUTION INTRAVENOUS at 10:32

## 2024-06-08 RX ADMIN — ENOXAPARIN SODIUM 90 MG: 100 INJECTION SUBCUTANEOUS at 16:22

## 2024-06-08 RX ADMIN — ASPIRIN 81 MG: 81 TABLET, COATED ORAL at 21:35

## 2024-06-08 RX ADMIN — THIAMINE HYDROCHLORIDE 100 MG: 100 INJECTION, SOLUTION INTRAMUSCULAR; INTRAVENOUS at 17:26

## 2024-06-08 RX ADMIN — DEXTROSE MONOHYDRATE 25 G: 25 INJECTION, SOLUTION INTRAVENOUS at 17:27

## 2024-06-08 RX ADMIN — DEXTROSE 15 G: 15 GEL ORAL at 16:31

## 2024-06-08 RX ADMIN — DULOXETINE HYDROCHLORIDE 30 MG: 30 CAPSULE, DELAYED RELEASE ORAL at 21:34

## 2024-06-08 RX ADMIN — OLANZAPINE 5 MG: 5 TABLET, FILM COATED ORAL at 21:35

## 2024-06-08 RX ADMIN — Medication 10 ML: at 10:32

## 2024-06-08 RX ADMIN — BRIMONIDINE TARTRATE 1 DROP: 2 SOLUTION OPHTHALMIC at 21:36

## 2024-06-08 RX ADMIN — METOPROLOL TARTRATE 12.5 MG: 25 TABLET, FILM COATED ORAL at 21:34

## 2024-06-08 RX ADMIN — TERAZOSIN HYDROCHLORIDE 1 MG: 1 CAPSULE ORAL at 21:36

## 2024-06-08 RX ADMIN — Medication 10 ML: at 21:35

## 2024-06-08 RX ADMIN — DEXTROSE MONOHYDRATE 25 G: 25 INJECTION, SOLUTION INTRAVENOUS at 04:02

## 2024-06-08 NOTE — PROGRESS NOTES
"[facility] Clinical Pharmacy Services: Enoxaparin Consult    Dilip Verma has a pharmacy consult to dose full-dose enoxaparin while apixaban is being held per Dr. Westbrook' request.     Indication: A Fib - requiring full anticoagulation  Home Anticoagulation: None     Relevant clinical data and objective history reviewed:  74 y.o. male 172.7 cm (68\") 92.5 kg (204 lb)   Body mass index is 31.02 kg/m².     Results from last 7 days   Lab Units 06/06/24  0620   PLATELETS 10*3/mm3 261     Estimated Creatinine Clearance: 45.7 mL/min (A) (by C-G formula based on SCr of 1.54 mg/dL (H)).    Assessment/Plan    Will start patient on  90 mg (1mg/kg) subcutaneous every 12 hours, adjusted for renal function. Consult order will be discontinued but pharmacy will continue to follow.     Daisy Slater, Pharm.D., [facility]   Clinical Pharmacist   "

## 2024-06-08 NOTE — PROGRESS NOTES
PROGRESS NOTE      Patient Name: Dilip Verma  : 1950  MRN: [id number]  Primary Care Physician: Fernando Camargo DO  Date of admission: 2024    Patient Care Team:  Fernando Camargo DO as PCP - General (Family Medicine)  Morris Cai MD as Consulting Physician (Sleep Medicine)  Michaela Hylton MD as Consulting Physician (Cardiology)  Hardy Banerjee MD as Consulting Physician (Ophthalmology)  Chula Christianson MD as Consulting Physician (Ophthalmology)  Jason Sherman MD (Endocrinology)  Perla Mayen MD as Consulting Physician (Nephrology)  Federico Hebert MD as Consulting Physician (Pulmonary Disease)  Niraj Ravi MD as Consulting Physician (Orthopedic Surgery)  Papa Velasco MD as Consulting Physician (Urology)  Avtar Yost MD as Consulting Physician (Gastroenterology)  Aracelis Ball MD as Consulting Physician (Colon and Rectal Surgery)  [facility] as Primary Care Provider        Reason for Follow up:     AMENA, CKD III      Subjective:     Renal function satisfactory    Personal History:     Past Medical History:   Past Medical History:   Diagnosis Date    AMENA (acute kidney injury) 2020    Alcoholism 1989    not since     CORI positive     Anemia     Anxiety     Ataxia     Atypical chest pain 2022    SEEN AT [facility] ER    Balance disorder     Carotid stenosis 3/28/2024    Cataract     BILATERAL, S/P EXTRACTION    Cervical radiculopathy 2019    SEEN AT  [facility] ER    Cervical spinal cord compression     Chronic diastolic (congestive) heart failure     Chronic kidney disease     STAGE 3, FOLLOWED BY DR. MAYEN    Chronic pancreatitis     Closed left subtrochanteric femur fracture 2020    ADMITTED TO [facility]    Closed nondisplaced intertrochanteric fracture of left femur 2020    ADMITTED TO [facility]    Colon polyps     FOLLOWED BY DR. AVTAR YOST    Constipation     Contracture, right hand     Coronary artery disease      "CABG 7/2019    COVID-19 07/2022    DDD (degenerative disc disease), cervical     Diabetes mellitus, type II     IDDM    Diabetic retinopathy     Difficulty walking     Dysphagia     Elevated brain natriuretic peptide (BNP) level 08/2014    Elevated LFTs 03/2021    Erectile dysfunction     Fissure, anal 2022    Foot drop     Fuchs' corneal dystrophy of right eye     Glaucoma     BILATERAL    History of alcohol abuse     Hyperlipidemia     Hypersomnia     Hypertension     Hypertensive urgency 02/25/2020    ADMITTED TO [facility]    Insomnia     Kidney stones     LV dysfunction 06/2016    Lyme disease     Lymphadenopathy syndrome 05/2021    Macular edema     BILATERAL    Myocardial infarction 11/05/2019    NSTEMI, ADMITTED TO [facility]    Myocardial infarction 07/12/2019    NSTEMI, ADMITTED TO [facility]    Neuropathy in diabetes     Non-celiac gluten sensitivity     Orthostasis     Osteoporosis     Oxygen dependent     PAD (peripheral artery disease)     Paroxysmal atrial fibrillation 6/6/2024    PNA (pneumonia) 06/2016    LEFT LOBE    Polyneuropathy     PTSD (post-traumatic stress disorder)     Pulmonary hypertension     Pulmonary nodule     Senile ectropion of both lower eyelids 02/2022    Sepsis 12/16/2020    D/T UTI, ADMITTED TO [facility]    Sleep apnea     STATES DOESN'T USE BIPAP OR CPAP    Spinal stenosis in cervical region     SEVERE-LIMITED ROM    Stroke 2012    \"slight stroke\"    Syncope and collapse 07/06/2019    ADMITTED TO [place]    Urinary retention 04/05/2021    SEEN AT [facility] ER    Vision loss     Vitamin D deficiency        Surgical History:      Past Surgical History:   Procedure Laterality Date    CARDIAC CATHETERIZATION N/A 07/15/2019    Procedure: Coronary angiography;  Surgeon: Carrie Price MD;  Location:  [facility] C[facility]VASIVE LOCATION;  Service: Cardiovascular    CARDIAC CATHETERIZATION N/A 07/15/2019    Procedure: Left Heart Cath;  Surgeon: Carrie Price MD;  Location:  [facility] LOCATION;  Service: " Cardiovascular    CARDIAC CATHETERIZATION N/A 07/15/2019    Procedure: Left ventriculography;  Surgeon: Carrie Price MD;  [facility];  Service: Cardiovascular    CARDIAC CATHETERIZATION  07/15/2019    Procedure: Functional Flow [place];  Surgeon: Carrie Price MD;  Location:  [facility]ASIVE LOCATION;  Service: Cardiovascular    CARDIAC CATHETERIZATION N/A 11/06/2019    Procedure: Right and Left Heart Cath;  Surgeon: Mya Smith MD;  [facility];  Service: Cardiovascular    CARDIAC CATHETERIZATION N/A 11/06/2019    Procedure: Coronary angiography;  Surgeon: Mya Smith MD;  Location: [facility] CATH INVASIVE LOCATION;  Service: Cardiovascular    CARDIAC SURGERY      CATARACT EXTRACTION Left 2014    CATARACT EXTRACTION Right 11/2016    PHACO/IOL, DR. LEN DENTON    COLONOSCOPY N/A 03/16/2023    ENTIRE COLON WNL, RESCOPE IN 5 YRS, DR. LINDA LIZARRAGA AT [facility]    COLONOSCOPY W/ POLYPECTOMY N/A 01/02/2015    A FEW DIVERTICULA IN SIGMOID, 6 MM TUBULOVILLOUS ADENOMA POLYP IN RECTUM, SMALL HEMORRHOIDS, MELANOSIS COLI, DR. AVTAR JEONG AT [place] [facility]    CORONARY ARTERY BYPASS GRAFT N/A 07/18/2019    Procedure: INTRAOPERATIVE SHOAIB; STERNOTOMY CORONARY ARTERY BYPASS x 3  USING LEFT INTERNAL MAMMARY ARTERY GRAFT UTILIZING ENDOSCOPICALLY HARVESTED RIGHT GREATER SAPHENOUS VEIN AND PRP.;  Surgeon: Bill Devi MD;  Location: [facility] OR;  Service: Cardiothoracic    CYSTOSCOPY BLADDER STONE LITHOTRIPSY N/A     DENTAL PROCEDURE Bilateral     3 surgeries inder implants    FEMUR IM NAILING/RODDING Left 12/03/2020    Procedure: LEFT HIP INTRAMEDULLARY NAIL;  Surgeon: Niraj Ravi MD;  Location: [facility] MAIN OR;  Service: Orthopedic Spine;  Laterality: Left;    INCISION AND DRAINAGE PERIRECTAL ABSCESS N/A 10/04/2023    Procedure: Incision and drainage of perianal and buttock abscess;  Surgeon: Herbie Villatoro MD;  Location: [facility] MAIN OR;   Service: General;  Laterality: N/A;    INGUINAL HERNIA REPAIR Bilateral     TOENAIL EXCISION  06/2022    TONSILLECTOMY Bilateral     TOTAL HIP ARTHROPLASTY REVISION Left 01/11/2022    Procedure: TOTAL HIP ARTHROPLASTY REVISION- POSTERIOR;  Surgeon: Jurgen Candelaria II, MD;  Location: [facility] MAIN OR;  Service: Orthopedics;  Laterality: Left;    VASECTOMY N/A        Family History: family history includes Alcohol abuse in his brother and paternal grandfather; Arrhythmia in his mother; Cancer in his mother; Depression in his mother and sister; Glaucoma in his maternal grandmother; Heart attack in his paternal grandmother; Heart disease in his father and mother; Heart failure in his father; Hyperlipidemia in his father and mother; Hypertension in his father and mother; Kidney disease in his father; Lung disease in his paternal uncle; Mental illness in his mother; Migraines in his mother; Stroke in his maternal grandmother and paternal grandmother; Thyroid disease in his father and paternal uncle; Uterine cancer in his mother. Otherwise pertinent FHx was reviewed and unremarkable.     Social History:  reports that he quit smoking about 24 years ago. His smoking use included cigarettes. He started smoking about 40 years ago. He has a 12 pack-year smoking history. He has been exposed to tobacco smoke. He has never used smokeless tobacco. He reports that he does not currently use alcohol. He reports that he does not use drugs.    Medications:  Prior to Admission medications    Medication Sig Start Date End Date Taking? Authorizing Provider   aspirin 81 MG EC tablet Take 1 tablet by mouth Every Night.   Yes Heri Rinaldi MD   brimonidine (ALPHAGAN) 0.2 % ophthalmic solution Administer 1 drop to both eyes 2 (Two) Times a Day.   Yes Heri Rinaldi MD   bumetanide (BUMEX) 1 MG tablet Take 1 tablet by mouth Daily.   Yes Heri Rinaldi MD   Cholecalciferol (Vitamin D-3) 125 MCG (5000 UT) tablet Take  1 tablet by mouth Daily.   Yes Heri Rinaldi MD   DULoxetine (CYMBALTA) 30 MG capsule Take 1 capsule by mouth Every Night. 1/16/24  Yes Heri Rinaldi MD   Insulin Glargine, 2 Unit Dial, (TOUJEO) 300 UNIT/ML solution pen-injector injection Inject 24 Units under the skin into the appropriate area as directed Daily.   Yes Heri Rinaldi MD   insulin lispro (humaLOG) 100 UNIT/ML injection Inject 0-14 Units under the skin into the appropriate area as directed 3 (Three) Times a Day Before Meals.  Patient taking differently: Inject 0-14 Units under the skin into the appropriate area as directed 3 (Three) Times a Day Before Meals. SLIDING SCALE  100-150 - 4 units  151-200 - 5 units  201-250 - 6 units  251-300 - 7 units  301-350 - 8 units  351-400 - 9 units  401+ - 10 units 12/7/20  Yes Amador Valverde MD   latanoprost (XALATAN) 0.005 % ophthalmic solution Administer 1 drop to both eyes every night at bedtime. 1/16/23  Yes Heri Rinaldi MD   multivitamin with minerals (MULTIVITAMIN ADULTS PO) Take 1 tablet by mouth Daily.   Yes Heri Rinaldi MD   testosterone (ANDROGEL) 25 MG/2.5GM (1%) gel gel Place 25 mg on the skin as directed by provider 2 (Two) Times a Week.   Yes Heri Rinaldi MD   traMADol (ULTRAM) 50 MG tablet Take 1 tablet by mouth At Night As Needed. 10/16/23  Yes Heri Rinaldi MD   Magnesium 400 MG capsule Take 400 mg by mouth As Needed.    Heri Rinaldi MD   sildenafil (REVATIO) 20 MG tablet Take 1 tablet by mouth As Needed.    Heri Rinaldi MD     Scheduled Meds:amiodarone, 200 mg, Oral, Q24H  apixaban, 5 mg, Oral, Q12H  aspirin, 81 mg, Oral, Daily  brimonidine, 1 drop, Both Eyes, BID  bumetanide, 0.5 mg, Oral, Daily  cefepime, 2,000 mg, Intravenous, Q12H  DULoxetine, 30 mg, Oral, Daily  hydrALAZINE, 25 mg, Oral, Q8H  insulin glargine, 20 Units, Subcutaneous, Daily  insulin lispro, 2-9 Units, Subcutaneous, 4x Daily AC & at  Bedtime  latanoprost, 1 drop, Both Eyes, Nightly  metoprolol tartrate, 12.5 mg, Oral, Q12H  OLANZapine, 5 mg, Oral, Nightly  senna-docusate sodium, 2 tablet, Oral, BID  sodium chloride, 10 mL, Intravenous, Q12H  terazosin, 1 mg, Oral, Nightly  traMADol, 50 mg, Oral, Once      Continuous Infusions:     PRN Meds:  acetaminophen **OR** acetaminophen **OR** acetaminophen    senna-docusate sodium **AND** polyethylene glycol **AND** bisacodyl **AND** bisacodyl    dextrose    dextrose    glucagon (human recombinant)    nitroglycerin    OLANZapine    ondansetron    sodium chloride    sodium chloride    traMADol  Allergies:    Allergies   Allergen Reactions    Ace Inhibitors Angioedema    Angiotensin Receptor Blockers Angioedema and Unknown (See Comments)     Angioneurotic edema    Dapagliflozin Other (See Comments)     UTI,  rectal abcess    Losartan Angioedema     Angioneurotic edema    Seroquel [Quetiapine] Hallucinations    Xanax [Alprazolam] Unknown - High Severity    Amlodipine Swelling    Ativan [Lorazepam] Hallucinations    Baclofen Hallucinations    Misc. Sulfonamide Containing Compounds Unknown (See Comments)    Minoxidil Confusion    Tetracycline Rash     bliisters in mouth         Objective   Exam:     Vital Signs  Temp:  [97.7 °F (36.5 °C)-98.9 °F (37.2 °C)] 98.9 °F (37.2 °C)  Heart Rate:  [70-82] 82  Resp:  [18] 18  BP: (102-182)/(78-82) 166/82  SpO2:  [93 %-96 %] 94 %  on  Flow (L/min):  [5] 5;   Device (Oxygen Therapy): nasal cannula  Body mass index is 31.02 kg/m².     Results Review:  I have personally reviewed most recent Data :  [facility]  CBC    Results from last 7 days   Lab Units 06/06/24  0620 06/03/24  0608 06/02/24  0752   WBC 10*3/mm3 10.27 7.71 7.94   HEMOGLOBIN g/dL 12.9* 13.1 13.0   PLATELETS 10*3/mm3 261 195 204     CMP   Results from last 7 days   Lab Units 06/07/24  0651 06/06/24  1715 06/06/24  0826 06/06/24  0620 06/05/24  0654 06/04/24  0741 06/03/24  0608  06/02/24  0752   SODIUM mmol/L 140 142 141 140 136 140 140 141   POTASSIUM mmol/L 3.8 3.9 4.0 4.1 3.9 4.1 3.9 4.2   CHLORIDE mmol/L 107 106 106 106 102 106 105 107   CO2 mmol/L 21.3* 20.3* 22.7 21.2* 22.1 25.0 23.1 24.0   BUN mg/dL 28* 29* 28* 27* 24* 17 17 23   CREATININE mg/dL 1.48* 1.61* 1.83* 1.78* 1.45* 1.29* 1.07 0.94   GLUCOSE mg/dL 119* 78 76 79 108* 129* 105* 74   ALBUMIN g/dL  --   --   --   --   --   --  3.2* 3.2*     ABG          US Renal Bilateral    Result Date: 5/31/2024  Thickened urinary bladder wall. Low volume bladder. No hydronephrosis.  This report was finalized on 5/31/2024 6:25 PM by Dr. Zechariah Falcon M.D on Workstation: [id number]       Results for orders placed during the hospital encounter of 05/30/24    Adult Transthoracic Echo Complete W/ Cont if Necessary Per Protocol    Interpretation Summary    Left ventricular systolic function is hyperdynamic (EF > 70%). Calculated left ventricular EF = 75% Global longitudinal LV strain (GLS) = -17.8%. Left ventricle strain data was reviewed by the physician and found to be accurate. Global longitudinal LV strain is normal however there is regional abnormality with apical sparing suggestive of amyloid heart disease. However there is also basal ptal hypertrophy suggestive of hypertrophic cardiomyopathy. Wall motion abnormality is also noted in the basal septum and cannot rule out ischemic component.Consider additional imaging such as cardiac MRI and further evaluation also for amyloid heart disease as clinically indicated. The left ventricular cavity is small in size. Left ventricular wall thickness is consistent with moderate to severe septal asymmetric hypertrophy. There is hypokinesis of the left ventricular basal septum. Left ventricular diastolic function is consistent with (grade II w/high LAP) pseudonormalization.    The left atrial cavity is mildly dilated.    No aortic valve regurgitation or stenosis is present. The aortic valve is abnormal  in structure. There is mild thickening of the aortic valve.    There is mild, bileaflet mitral valve thickening present. Trace mitral valve regurgitation is present. No significant mitral valve stenosis is present.    Moderate tricuspid valve regurgitation is present. Estimated right ventricular systolic pressure from tricuspid regurgitation is markedly elevated (>55 mmHg). Calculated right ventricular systolic pressure from tricuspid regurgitation is 74 mmHg.        Assessment & Plan   Assessment and Plan:         Acute UTI (urinary tract infection)    Type 2 diabetes mellitus with hyperglycemia, with long-term current use of insulin    CAD (coronary artery disease)    Hx of CABG    Chronic diastolic heart failure    CSA (central sleep apnea)    HTN (hypertension)    History of stroke    CKD (chronic kidney disease) stage 3, GFR 30-59 ml/min    Peripheral arterial disease    Sepsis    Hyperkalemia    Metabolic encephalopathy    AMENA (acute kidney injury)    Paroxysmal atrial fibrillation    ASSESSMENT:  AMENA likely prerenal ATN 2/2 urosepsis with hemodynamic changes; on CKD III etiology likely diabetic and hypertensive nephropathy with baseline sCr ~0.9-1.2 mg/dL  Hyperkalemia, now resolved  UTI, urine cultures with gram neg bacilli  Leukocytosis  Metabolic encephalopathy  PAD  Hx stroke  HTN  CHF  CAD  DM2  Chronic splenic vein thrombosis  Possible hepatocellular disease/cirrhosis  Likely benign subpleural nodule with bilateral lymphadenopathy  Hiatal hernia    Last TTE 10/5/22 with EF 66%, grade II DD    PLAN :     AMENA likely prerenal ATN 2/2 urosepsis with hemodynamic changes; on CKD III etiology likely diabetic and hypertensive nephropathy with baseline sCr ~0.9-1.2 mg/dL  Creatinine better   Currently on Bumex 0.5 mg daily, will continue  Volume with CXR with bibasilar consolidation,   Electrolytes, acid/base acceptable  Followed by infectious disease, evaluation noted, appreciate assistance.  BP trends  acceptable  Cardiology following for new onset A-fib, evaluation noted  Avoid NSAIDs, nephrotoxic agents  We will follow and coordinate with team  Overall stable from renal standpoint  More confused today   stable from renal standpoint to be discharged with outpatient follow-up in 1 to 2 weeks, when medically stable       Ronald Moura MD  [facility]  6/8/2024  08:08 EDT

## 2024-06-08 NOTE — NURSING NOTE
Patient awake most of the night. Patient is combative towards staff, extremely confused and screams any time he is touched. RN thinks patient has too much stimulation. RN educated family on trying to keep the room quiet and dark as to aid in the patient resting peacefully. Patient had 2 big BM's overnight. Turned frequently and repositioned. Patient kept attempting to get out of the bed. Bed alarm is set and staff round a lot.

## 2024-06-08 NOTE — PROGRESS NOTES
Name: Dilip Verma ADMIT: 2024   : 1950  PCP: Fernando Camargo DO    MRN: [id number] LOS: 9 days   AGE/SEX: 74 y.o. male  ROOM: [facility]     Subjective   Subjective   Patient continues to have agitation and hallucinations. He is not taking PO.  His wife is at bedside.    Objective   Objective   Vital Signs  Temp:  [98.9 °F (37.2 °C)] 98.9 °F (37.2 °C)  Heart Rate:  [80-82] 80  Resp:  [18] 18  BP: (112-182)/() 149/89  SpO2:  [94 %-100 %] 100 %  on  Flow (L/min):  [4-5] 4;   Device (Oxygen Therapy): nasal cannula  Body mass index is 31.02 kg/m².  Physical Exam  Vitals and nursing note reviewed.   Constitutional:       General: He is not in acute distress.     Appearance: He is ill-appearing (chronically). He is not toxic-appearing or diaphoretic.   HENT:      Head: Normocephalic and atraumatic.      Nose: Nose normal.      Mouth/Throat:      Mouth: Mucous membranes are moist.      Pharynx: Oropharynx is clear.   Eyes:      Conjunctiva/sclera: Conjunctivae normal.      Pupils: Pupils are equal, round, and reactive to light.   Cardiovascular:      Rate and Rhythm: Normal rate and regular rhythm.      Pulses: Normal pulses.   Pulmonary:      Effort: Pulmonary effort is normal.      Breath sounds: Normal breath sounds.   Abdominal:      General: Bowel sounds are normal.      Palpations: Abdomen is soft.      Tenderness: There is no abdominal tenderness.   Musculoskeletal:         General: Swelling (1-2+ BLE) present.      Cervical back: Neck supple.   Skin:     General: Skin is warm and dry.   Neurological:      Mental Status: He is lethargic.      Comments: Having periodic myoclonic jerks       Results Review     I reviewed the patient's new clinical results.  Results from last 7 days   Lab Units 24  0620 24  0608 24  0752   WBC 10*3/mm3 10.27 7.71 7.94   HEMOGLOBIN g/dL 12.9* 13.1 13.0   PLATELETS 10*3/mm3 261 195 204     Results from last 7 days   Lab Units 24  0718  06/07/24  0651 06/06/24  1715 06/06/24  0826   SODIUM mmol/L 146* 140 142 141   POTASSIUM mmol/L 3.5 3.8 3.9 4.0   CHLORIDE mmol/L 110* 107 106 106   CO2 mmol/L 23.4 21.3* 20.3* 22.7   BUN mg/dL 24* 28* 29* 28*   CREATININE mg/dL 1.54* 1.48* 1.61* 1.83*   GLUCOSE mg/dL 76 119* 78 76   EGFR mL/min/1.73 46.7* 49.0* 44.3* 38.0*     Results from last 7 days   Lab Units 06/03/24  0608 06/02/24  0752   ALBUMIN g/dL 3.2* 3.2*     Results from last 7 days   Lab Units 06/08/24  0718 06/07/24  0651 06/06/24  1715 06/06/24  0826 06/05/24  0654 06/04/24  0741 06/03/24  0608 06/02/24  0752   CALCIUM mg/dL 8.8 8.5* 8.6 8.7   < > 8.3* 8.6 8.5*   ALBUMIN g/dL  --   --   --   --   --   --  3.2* 3.2*   MAGNESIUM mg/dL  --   --   --   --   --  2.0 2.0 2.1   PHOSPHORUS mg/dL  --   --   --   --   --   --  3.1 2.4*    < > = values in this interval not displayed.       Glucose   Date/Time Value Ref Range Status   06/08/2024 1128 98 70 - 130 mg/dL Final   06/08/2024 0914 60 (L) 70 - 130 mg/dL Final   06/08/2024 0603 77 70 - 130 mg/dL Final   06/07/2024 1928 166 (H) 70 - 130 mg/dL Final   06/07/2024 1853 63 (L) 70 - 130 mg/dL Final   06/07/2024 1556 97 70 - 130 mg/dL Final   06/07/2024 1113 110 70 - 130 mg/dL Final       No radiology results for the last day    I have personally reviewed all medications:  Scheduled Medications  amiodarone, 200 mg, Oral, Q24H  [Held by provider] apixaban, 5 mg, Oral, Q12H  aspirin, 81 mg, Oral, Daily  brimonidine, 1 drop, Both Eyes, BID  bumetanide, 0.5 mg, Oral, Daily  DULoxetine, 30 mg, Oral, Daily  enoxaparin, 1 mg/kg, Subcutaneous, BID  hydrALAZINE, 25 mg, Oral, Q8H  insulin lispro, 2-9 Units, Subcutaneous, 4x Daily AC & at Bedtime  latanoprost, 1 drop, Both Eyes, Nightly  metoprolol tartrate, 12.5 mg, Oral, Q12H  OLANZapine, 5 mg, Oral, Nightly  senna-docusate sodium, 2 tablet, Oral, BID  sodium chloride, 10 mL, Intravenous, Q12H  terazosin, 1 mg, Oral, Nightly  traMADol, 50 mg, Oral,  Once    Infusions   Diet  Diet: Regular/House, Diabetic, Cardiac; Healthy Heart (2-3 Na+); Consistent Carbohydrate; Texture: Regular (IDDSI 7); Fluid Consistency: Thin (IDDSI 0)    I have personally reviewed:  [x]  Laboratory   []  Microbiology   []  Radiology   [x]  EKG/Telemetry  []  Cardiology/Vascular   []  Pathology    []  Records    Assessment/Plan     Active Hospital Problems    Diagnosis  POA    **Acute UTI (urinary tract infection) [N39.0]  Yes    Paroxysmal atrial fibrillation [I48.0]  Yes    AMENA (acute kidney injury) [N17.9]  Yes    Sepsis [A41.9]  Yes    Hyperkalemia [E87.5]  Yes    Metabolic encephalopathy [G93.41]  Yes    Peripheral arterial disease [I73.9]  Yes    History of stroke [Z86.73]  Not Applicable    CKD (chronic kidney disease) stage 3, GFR 30-59 ml/min [N18.30]  Yes    HTN (hypertension) [I10]  Yes    CSA (central sleep apnea) [G47.31]  Yes    Chronic diastolic heart failure [I50.32]  Yes    Hx of CABG [Z95.1]  Not Applicable    CAD (coronary artery disease) [I25.10]  Yes    Type 2 diabetes mellitus with hyperglycemia, with long-term current use of insulin [E11.65, Z79.4]  Not Applicable      Resolved Hospital Problems   No resolved problems to display.   Acute UTI/LLE Cellulitis  - urine culture growew Serratia  - at this point he has had 9 days of abx-I suspect strongly that the cefepime is causing his encephalopathy or at least heavily contributing  - appreciate ID recs-I discussed with Dr. Nagel and we are going to watch him off of antibiotics as at this point the adverse effects of this treatment outweigh potential benefits    HTNCAD/PAD/New Onset Afib (PAF)/Chronic Diastolic CHF  - BP acceptable, no anginal symptoms, in NSR  - continue on current regimen  - on AC with eliquis but not taking PO now due to severe encephalopathy-hold eliquis and ask pharmacy to dose lovenox  - appreciate cardiology recs    Type 2 DM  - having hypoglycemia, will stop lantus  - continue coverage with  ssi/hypoglycemia protocol    AMENA  - likely pre-renal with ATN  - overall stable  - continue on bumex-switch to IV for now  - appreciate nephrology recs    Hypoxia  - CXR noted, improved after additional dose of bumex  - monitor and encourage pulmonary toilet as able    Delirium/Toxic Encephalopathy  - continue on scheduled and PRN zyprexa  - he is current with palliatus at home and takes PRN tramadol, this has been restarted  - I suspect that much of this is due to cefepime, will stop this now as above    Pharmacy to dose Lovenox for DVT prophylaxis.  Full code.  Discussed with patient, spouse, and nursing staff.  Anticipate discharge home with family timing yet to be determined.  Expected Discharge Date: 6/10/2024; Expected Discharge Time:       Darci Westbrook MD  [facility]  06/08/24  15:01 EDT    Portions of this text have been copied and I have reviewed them. They are accurate as of 6/8/2024

## 2024-06-08 NOTE — PLAN OF CARE
Problem: Adult Inpatient Plan of Care  Goal: Plan of Care Review  Outcome: Ongoing, Not Progressing  Goal: Patient-Specific Goal (Individualized)  Outcome: Ongoing, Not Progressing  Goal: Absence of Hospital-Acquired Illness or Injury  Outcome: Ongoing, Not Progressing  Goal: Optimal Comfort and Wellbeing  Outcome: Ongoing, Not Progressing  Intervention: Monitor Pain and Promote Comfort  Recent Flowsheet Documentation  Taken 6/7/2024 2000 by Ginna Cedeno, RN  Pain Management Interventions:   pillow support provided   position adjusted  Intervention: Provide Person-Centered Care  Recent Flowsheet Documentation  Taken 6/7/2024 2000 by Ginna Cedeno, RN  Trust Relationship/Rapport:   care explained   choices provided   emotional support provided   empathic listening provided   questions answered  Goal: Readiness for Transition of Care  Outcome: Ongoing, Not Progressing     Problem: Diabetes Comorbidity  Goal: Blood Glucose Level Within Targeted Range  Outcome: Ongoing, Not Progressing     Problem: Heart Failure Comorbidity  Goal: Maintenance of Heart Failure Symptom Control  Outcome: Ongoing, Not Progressing     Problem: Hypertension Comorbidity  Goal: Blood Pressure in Desired Range  Outcome: Ongoing, Not Progressing     Problem: Obstructive Sleep Apnea Risk or Actual Comorbidity Management  Goal: Unobstructed Breathing During Sleep  Outcome: Ongoing, Not Progressing     Problem: Skin Injury Risk Increased  Goal: Skin Health and Integrity  Outcome: Ongoing, Not Progressing     Problem: Fall Injury Risk  Goal: Absence of Fall and Fall-Related Injury  Outcome: Ongoing, Not Progressing     Problem: Restraint, Nonviolent  Goal: Absence of Harm or Injury  Outcome: Ongoing, Not Progressing  Intervention: Implement Least Restrictive Safety Strategies  Recent Flowsheet Documentation  Taken 6/7/2024 2000 by Ginna Cedeno, RN  Less Restrictive Alternative: bed alarm in use  Diversional Activities:  television  Intervention: Protect Dignity, Rights, and Personal Wellbeing  Recent Flowsheet Documentation  Taken 6/7/2024 2000 by Ginna Cedeno, RN  Trust Relationship/Rapport:   care explained   choices provided   emotional support provided   empathic listening provided   questions answered   Goal Outcome Evaluation:           Progress: no change

## 2024-06-08 NOTE — PLAN OF CARE
Problem: Adult Inpatient Plan of Care  Goal: Plan of Care Review  Flowsheets (Taken 6/8/2024 1449)  Outcome Evaluation: Confused, Restraints, IV ABX. 4L NC, Wife at bedside, Refuses all meds. Bed alarm on.     Problem: Adult Inpatient Plan of Care  Goal: Plan of Care Review  Flowsheets (Taken 6/8/2024 1449)  Outcome Evaluation: Confused, Restraints, IV ABX. 4L NC, Wife at bedside, Refuses all meds. Bed alarm on.   Goal Outcome Evaluation:              Outcome Evaluation: Confused, Restraints, IV ABX. 4L NC, Wife at bedside, Refuses all meds. Bed alarm on.

## 2024-06-08 NOTE — NURSING NOTE
Pt BS was 62 per Omni pod. Pt IV did not work gave Dextrose gel. BS was only 68. Informed MD. D5w@75 started. Got new IV,Gave Glucagon IV. BS is now 132.

## 2024-06-09 LAB
ANION GAP SERPL CALCULATED.3IONS-SCNC: 12 MMOL/L (ref 5–15)
BUN SERPL-MCNC: 24 MG/DL (ref 8–23)
BUN/CREAT SERPL: 14.7 (ref 7–25)
CALCIUM SPEC-SCNC: 8.2 MG/DL (ref 8.6–10.5)
CHLORIDE SERPL-SCNC: 105 MMOL/L (ref 98–107)
CO2 SERPL-SCNC: 25 MMOL/L (ref 22–29)
CREAT SERPL-MCNC: 1.63 MG/DL (ref 0.76–1.27)
EGFRCR SERPLBLD CKD-EPI 2021: 43.7 ML/MIN/1.73
GLUCOSE BLDC GLUCOMTR-MCNC: 115 MG/DL (ref 70–130)
GLUCOSE BLDC GLUCOMTR-MCNC: 132 MG/DL (ref 70–130)
GLUCOSE BLDC GLUCOMTR-MCNC: 133 MG/DL (ref 70–130)
GLUCOSE BLDC GLUCOMTR-MCNC: 137 MG/DL (ref 70–130)
GLUCOSE SERPL-MCNC: 125 MG/DL (ref 65–99)
POTASSIUM SERPL-SCNC: 3.1 MMOL/L (ref 3.5–5.2)
SODIUM SERPL-SCNC: 142 MMOL/L (ref 136–145)

## 2024-06-09 PROCEDURE — 25010000002 ENOXAPARIN PER 10 MG: Performed by: INTERNAL MEDICINE

## 2024-06-09 PROCEDURE — 80048 BASIC METABOLIC PNL TOTAL CA: CPT

## 2024-06-09 PROCEDURE — 82948 REAGENT STRIP/BLOOD GLUCOSE: CPT

## 2024-06-09 PROCEDURE — 25010000002 BUMETANIDE PER 0.5 MG: Performed by: INTERNAL MEDICINE

## 2024-06-09 PROCEDURE — 25010000002 THIAMINE HCL 200 MG/2ML SOLUTION: Performed by: INTERNAL MEDICINE

## 2024-06-09 PROCEDURE — 25010000002 OLANZAPINE 10 MG RECONSTITUTED SOLUTION: Performed by: INTERNAL MEDICINE

## 2024-06-09 PROCEDURE — 0 DEXTROSE 5 % SOLUTION: Performed by: INTERNAL MEDICINE

## 2024-06-09 RX ORDER — POTASSIUM CHLORIDE 750 MG/1
40 TABLET, FILM COATED, EXTENDED RELEASE ORAL ONCE
Status: COMPLETED | OUTPATIENT
Start: 2024-06-09 | End: 2024-06-09

## 2024-06-09 RX ADMIN — BRIMONIDINE TARTRATE 1 DROP: 2 SOLUTION OPHTHALMIC at 21:57

## 2024-06-09 RX ADMIN — Medication 10 ML: at 09:44

## 2024-06-09 RX ADMIN — HYDRALAZINE HYDROCHLORIDE 25 MG: 25 TABLET ORAL at 15:02

## 2024-06-09 RX ADMIN — TRAMADOL HYDROCHLORIDE 50 MG: 50 TABLET ORAL at 05:48

## 2024-06-09 RX ADMIN — HYDRALAZINE HYDROCHLORIDE 25 MG: 25 TABLET ORAL at 05:48

## 2024-06-09 RX ADMIN — OLANZAPINE 5 MG: 5 TABLET, FILM COATED ORAL at 21:56

## 2024-06-09 RX ADMIN — ASPIRIN 81 MG: 81 TABLET, COATED ORAL at 21:56

## 2024-06-09 RX ADMIN — AMIODARONE HYDROCHLORIDE 200 MG: 200 TABLET ORAL at 09:43

## 2024-06-09 RX ADMIN — DULOXETINE HYDROCHLORIDE 30 MG: 30 CAPSULE, DELAYED RELEASE ORAL at 21:56

## 2024-06-09 RX ADMIN — TRAMADOL HYDROCHLORIDE 50 MG: 50 TABLET ORAL at 21:56

## 2024-06-09 RX ADMIN — DEXTROSE MONOHYDRATE 75 ML/HR: 50 INJECTION, SOLUTION INTRAVENOUS at 05:38

## 2024-06-09 RX ADMIN — SENNOSIDES AND DOCUSATE SODIUM 2 TABLET: 50; 8.6 TABLET ORAL at 09:49

## 2024-06-09 RX ADMIN — ENOXAPARIN SODIUM 90 MG: 100 INJECTION SUBCUTANEOUS at 21:55

## 2024-06-09 RX ADMIN — LATANOPROST 1 DROP: 50 SOLUTION OPHTHALMIC at 21:57

## 2024-06-09 RX ADMIN — THIAMINE HYDROCHLORIDE 100 MG: 100 INJECTION, SOLUTION INTRAMUSCULAR; INTRAVENOUS at 09:43

## 2024-06-09 RX ADMIN — BRIMONIDINE TARTRATE 1 DROP: 2 SOLUTION OPHTHALMIC at 09:43

## 2024-06-09 RX ADMIN — HYDRALAZINE HYDROCHLORIDE 25 MG: 25 TABLET ORAL at 21:56

## 2024-06-09 RX ADMIN — ENOXAPARIN SODIUM 90 MG: 100 INJECTION SUBCUTANEOUS at 05:48

## 2024-06-09 RX ADMIN — TERAZOSIN HYDROCHLORIDE 1 MG: 1 CAPSULE ORAL at 21:56

## 2024-06-09 RX ADMIN — POTASSIUM CHLORIDE 40 MEQ: 750 TABLET, EXTENDED RELEASE ORAL at 15:02

## 2024-06-09 RX ADMIN — BUMETANIDE 0.5 MG: 0.25 INJECTION INTRAMUSCULAR; INTRAVENOUS at 09:43

## 2024-06-09 RX ADMIN — DEXTROSE MONOHYDRATE 75 ML/HR: 50 INJECTION, SOLUTION INTRAVENOUS at 16:25

## 2024-06-09 RX ADMIN — METOPROLOL TARTRATE 12.5 MG: 25 TABLET, FILM COATED ORAL at 09:49

## 2024-06-09 RX ADMIN — OLANZAPINE 10 MG: 10 INJECTION, POWDER, LYOPHILIZED, FOR SOLUTION INTRAMUSCULAR at 02:59

## 2024-06-09 NOTE — PROGRESS NOTES
PROGRESS NOTE      Patient Name: Dilip Verma  : 1950  MRN: [id number]  Primary Care Physician: Fernando Camargo DO  Date of admission: 2024    Patient Care Team:  Fernando Camargo DO as PCP - General (Family Medicine)  Morris Cai MD as Consulting Physician (Sleep Medicine)  Michaela Hylton MD as Consulting Physician (Cardiology)  Hardy Banerjee MD as Consulting Physician (Ophthalmology)  Chula Christianson MD as Consulting Physician (Ophthalmology)  Jason Sherman MD (Endocrinology)  Perla Mayen MD as Consulting Physician (Nephrology)  Federico Hebert MD as Consulting Physician (Pulmonary Disease)  Niraj Ravi MD as Consulting Physician (Orthopedic Surgery)  Papa Velasco MD as Consulting Physician (Urology)  Avtar Yost MD as Consulting Physician (Gastroenterology)  Aracelis Ball MD as Consulting Physician (Colon and Rectal Surgery)  [facility] as Primary Care Provider        Reason for Follow up:     AMENA, CKD III      Subjective:     Renal function satisfactory    Personal History:     Past Medical History:   Past Medical History:   Diagnosis Date    AMENA (acute kidney injury) 2020    Alcoholism 1989    not since     CORI positive     Anemia     Anxiety     Ataxia     Atypical chest pain 2022    SEEN AT [facility] ER    Balance disorder     Carotid stenosis 3/28/2024    Cataract     BILATERAL, S/P EXTRACTION    Cervical radiculopathy 2019    SEEN AT  [facility] ER    Cervical spinal cord compression     Chronic diastolic (congestive) heart failure     Chronic kidney disease     STAGE 3, FOLLOWED BY DR. MAYEN    Chronic pancreatitis     Closed left subtrochanteric femur fracture 2020    ADMITTED TO [facility]    Closed nondisplaced intertrochanteric fracture of left femur 2020    ADMITTED TO [facility]    Colon polyps     FOLLOWED BY DR. AVTAR YOST    Constipation     Contracture, right hand     Coronary artery disease      "CABG 7/2019    COVID-19 07/2022    DDD (degenerative disc disease), cervical     Diabetes mellitus, type II     IDDM    Diabetic retinopathy     Difficulty walking     Dysphagia     Elevated brain natriuretic peptide (BNP) level 08/2014    Elevated LFTs 03/2021    Erectile dysfunction     Fissure, anal 2022    Foot drop     Fuchs' corneal dystrophy of right eye     Glaucoma     BILATERAL    History of alcohol abuse     Hyperlipidemia     Hypersomnia     Hypertension     Hypertensive urgency 02/25/2020    ADMITTED TO [facility]    Insomnia     Kidney stones     LV dysfunction 06/2016    Lyme disease     Lymphadenopathy syndrome 05/2021    Macular edema     BILATERAL    Myocardial infarction 11/05/2019    NSTEMI, ADMITTED TO [facility]    Myocardial infarction 07/12/2019    NSTEMI, ADMITTED TO [facility]    Neuropathy in diabetes     Non-celiac gluten sensitivity     Orthostasis     Osteoporosis     Oxygen dependent     PAD (peripheral artery disease)     Paroxysmal atrial fibrillation 6/6/2024    PNA (pneumonia) 06/2016    LEFT LOBE    Polyneuropathy     PTSD (post-traumatic stress disorder)     Pulmonary hypertension     Pulmonary nodule     Senile ectropion of both lower eyelids 02/2022    Sepsis 12/16/2020    D/T UTI, ADMITTED TO [facility]    Sleep apnea     STATES DOESN'T USE BIPAP OR CPAP    Spinal stenosis in cervical region     SEVERE-LIMITED ROM    Stroke 2012    \"slight stroke\"    Syncope and collapse 07/06/2019    ADMITTED TO [place]    Urinary retention 04/05/2021    SEEN AT [facility] ER    Vision loss     Vitamin D deficiency        Surgical History:      Past Surgical History:   Procedure Laterality Date    CARDIAC CATHETERIZATION N/A 07/15/2019    Procedure: Coronary angiography;  Surgeon: Carrie Price MD;  Location:  [facility] C[facility]VASIVE LOCATION;  Service: Cardiovascular    CARDIAC CATHETERIZATION N/A 07/15/2019    Procedure: Left Heart Cath;  Surgeon: Carrie Price MD;  Location:  [facility] LOCATION;  Service: " Cardiovascular    CARDIAC CATHETERIZATION N/A 07/15/2019    Procedure: Left ventriculography;  Surgeon: Carrie Price MD;  [facility];  Service: Cardiovascular    CARDIAC CATHETERIZATION  07/15/2019    Procedure: Functional Flow [place];  Surgeon: Carrie Price MD;  Location:  [facility] CATH [facility]VASIVE LOCATION;  Service: Cardiovascular    CARDIAC CATHETERIZATION N/A 11/06/2019    Procedure: Right and Left Heart Cath;  Surgeon: Mya Smith MD;  [facility];  Service: Cardiovascular    CARDIAC CATHETERIZATION N/A 11/06/2019    Procedure: Coronary angiography;  Surgeon: Mya Smith MD;  Location: [facility] CATH INVASIVE LOCATION;  Service: Cardiovascular    CARDIAC SURGERY      CATARACT EXTRACTION Left 2014    CATARACT EXTRACTION Right 11/2016    PHACO/IOL, DR. LEN DENTON    COLONOSCOPY N/A 03/16/2023    ENTIRE COLON WNL, RESCOPE IN 5 YRS, DR. LINDA LIZARRAGA AT [facility]    COLONOSCOPY W/ POLYPECTOMY N/A 01/02/2015    A FEW DIVERTICULA IN SIGMOID, 6 MM TUBULOVILLOUS ADENOMA POLYP IN RECTUM, SMALL HEMORRHOIDS, MELANOSIS COLI, DR. AVTAR JEONG AT [place] [facility]COPY    CORONARY ARTERY BYPASS GRAFT N/A 07/18/2019    Procedure: INTRAOPERATIVE SHOAIB; STERNOTOMY CORONARY ARTERY BYPASS x 3  USING LEFT INTERNAL MAMMARY ARTERY GRAFT UTILIZING ENDOSCOPICALLY HARVESTED RIGHT GREATER SAPHENOUS VEIN AND PRP.;  Surgeon: Bill Devi MD;  Location: [facility] OR;  Service: Cardiothoracic    CYSTOSCOPY BLADDER STONE LITHOTRIPSY N/A     DENTAL PROCEDURE Bilateral     3 surgeries inder implants    FEMUR IM NAILING/RODDING Left 12/03/2020    Procedure: LEFT HIP INTRAMEDULLARY NAIL;  Surgeon: Niraj Ravi MD;  Location: [facility] MAIN OR;  Service: Orthopedic Spine;  Laterality: Left;    INCISION AND DRAINAGE PERIRECTAL ABSCESS N/A 10/04/2023    Procedure: Incision and drainage of perianal and buttock abscess;  Surgeon: Herbie Villatoro MD;  Location: [facility] MAIN OR;   Service: General;  Laterality: N/A;    INGUINAL HERNIA REPAIR Bilateral     TOENAIL EXCISION  06/2022    TONSILLECTOMY Bilateral     TOTAL HIP ARTHROPLASTY REVISION Left 01/11/2022    Procedure: TOTAL HIP ARTHROPLASTY REVISION- POSTERIOR;  Surgeon: Jurgen Candelaria II, MD;  Location: [facility] MAIN OR;  Service: Orthopedics;  Laterality: Left;    VASECTOMY N/A        Family History: family history includes Alcohol abuse in his brother and paternal grandfather; Arrhythmia in his mother; Cancer in his mother; Depression in his mother and sister; Glaucoma in his maternal grandmother; Heart attack in his paternal grandmother; Heart disease in his father and mother; Heart failure in his father; Hyperlipidemia in his father and mother; Hypertension in his father and mother; Kidney disease in his father; Lung disease in his paternal uncle; Mental illness in his mother; Migraines in his mother; Stroke in his maternal grandmother and paternal grandmother; Thyroid disease in his father and paternal uncle; Uterine cancer in his mother. Otherwise pertinent FHx was reviewed and unremarkable.     Social History:  reports that he quit smoking about 24 years ago. His smoking use included cigarettes. He started smoking about 40 years ago. He has a 12 pack-year smoking history. He has been exposed to tobacco smoke. He has never used smokeless tobacco. He reports that he does not currently use alcohol. He reports that he does not use drugs.    Medications:  Prior to Admission medications    Medication Sig Start Date End Date Taking? Authorizing Provider   aspirin 81 MG EC tablet Take 1 tablet by mouth Every Night.   Yes Heri Rinaldi MD   brimonidine (ALPHAGAN) 0.2 % ophthalmic solution Administer 1 drop to both eyes 2 (Two) Times a Day.   Yes Heri Rinaldi MD   bumetanide (BUMEX) 1 MG tablet Take 1 tablet by mouth Daily.   Yes Heri Rinaldi MD   Cholecalciferol (Vitamin D-3) 125 MCG (5000 UT) tablet Take  1 tablet by mouth Daily.   Yes Heri Rinaldi MD   DULoxetine (CYMBALTA) 30 MG capsule Take 1 capsule by mouth Every Night. 1/16/24  Yes Heri Rinaldi MD   Insulin Glargine, 2 Unit Dial, (TOUJEO) 300 UNIT/ML solution pen-injector injection Inject 24 Units under the skin into the appropriate area as directed Daily.   Yes Heri Rinaldi MD   insulin lispro (humaLOG) 100 UNIT/ML injection Inject 0-14 Units under the skin into the appropriate area as directed 3 (Three) Times a Day Before Meals.  Patient taking differently: Inject 0-14 Units under the skin into the appropriate area as directed 3 (Three) Times a Day Before Meals. SLIDING SCALE  100-150 - 4 units  151-200 - 5 units  201-250 - 6 units  251-300 - 7 units  301-350 - 8 units  351-400 - 9 units  401+ - 10 units 12/7/20  Yes Amador Valverde MD   latanoprost (XALATAN) 0.005 % ophthalmic solution Administer 1 drop to both eyes every night at bedtime. 1/16/23  Yes Heri Rinaldi MD   multivitamin with minerals (MULTIVITAMIN ADULTS PO) Take 1 tablet by mouth Daily.   Yes Heri Rinaldi MD   testosterone (ANDROGEL) 25 MG/2.5GM (1%) gel gel Place 25 mg on the skin as directed by provider 2 (Two) Times a Week.   Yes Heri Rinaldi MD   traMADol (ULTRAM) 50 MG tablet Take 1 tablet by mouth At Night As Needed. 10/16/23  Yes Heri Rinaldi MD   Magnesium 400 MG capsule Take 400 mg by mouth As Needed.    Heri Rinaldi MD   sildenafil (REVATIO) 20 MG tablet Take 1 tablet by mouth As Needed.    Heri Rinaldi MD     Scheduled Meds:amiodarone, 200 mg, Oral, Q24H  [Held by provider] apixaban, 5 mg, Oral, Q12H  aspirin, 81 mg, Oral, Daily  brimonidine, 1 drop, Both Eyes, BID  bumetanide, 0.5 mg, Intravenous, Daily  DULoxetine, 30 mg, Oral, Daily  enoxaparin, 1 mg/kg, Subcutaneous, BID  hydrALAZINE, 25 mg, Oral, Q8H  insulin lispro, 2-9 Units, Subcutaneous, 4x Daily AC & at Bedtime  latanoprost, 1 drop, Both Eyes,  Nightly  metoprolol tartrate, 12.5 mg, Oral, Q12H  OLANZapine, 5 mg, Oral, Nightly  senna-docusate sodium, 2 tablet, Oral, BID  sodium chloride, 10 mL, Intravenous, Q12H  terazosin, 1 mg, Oral, Nightly  thiamine, 100 mg, Intravenous, Daily  traMADol, 50 mg, Oral, Once      Continuous Infusions:dextrose, 75 mL/hr, Last Rate: 75 mL/hr (06/09/24 0538)        PRN Meds:  acetaminophen **OR** acetaminophen **OR** acetaminophen    senna-docusate sodium **AND** polyethylene glycol **AND** bisacodyl **AND** bisacodyl    dextrose    dextrose    glucagon (human recombinant)    nitroglycerin    OLANZapine    ondansetron    sodium chloride    sodium chloride    traMADol  Allergies:    Allergies   Allergen Reactions    Ace Inhibitors Angioedema    Angiotensin Receptor Blockers Angioedema and Unknown (See Comments)     Angioneurotic edema    Dapagliflozin Other (See Comments)     UTI,  rectal abcess    Losartan Angioedema     Angioneurotic edema    Seroquel [Quetiapine] Hallucinations    Xanax [Alprazolam] Unknown - High Severity    Amlodipine Swelling    Ativan [Lorazepam] Hallucinations    Baclofen Hallucinations    Misc. Sulfonamide Containing Compounds Unknown (See Comments)    Minoxidil Confusion    Tetracycline Rash     bliisters in mouth         Objective   Exam:     Vital Signs  Temp:  [97.7 °F (36.5 °C)-99.1 °F (37.3 °C)] 97.7 °F (36.5 °C)  Heart Rate:  [66-91] 82  Resp:  [18] 18  BP: ()/(51-91) 131/54  SpO2:  [84 %-99 %] 90 %  on  Flow (L/min):  [2-3] 2;   Device (Oxygen Therapy): humidified;nasal cannula  Body mass index is 31.02 kg/m².     Results Review:  I have personally reviewed most recent Data :  [facility]  CBC    Results from last 7 days   Lab Units 06/06/24  0620 06/03/24  0608   WBC 10*3/mm3 10.27 7.71   HEMOGLOBIN g/dL 12.9* 13.1   PLATELETS 10*3/mm3 261 195     CMP   Results from last 7 days   Lab Units 06/09/24  0612 06/08/24  0718 06/07/24  0651 06/06/24  1715 06/06/24  0826  06/06/24  0620 06/05/24  0654 06/04/24  0741 06/03/24  0608   SODIUM mmol/L 142 146* 140 142 141 140 136   < > 140   POTASSIUM mmol/L 3.1* 3.5 3.8 3.9 4.0 4.1 3.9   < > 3.9   CHLORIDE mmol/L 105 110* 107 106 106 106 102   < > 105   CO2 mmol/L 25.0 23.4 21.3* 20.3* 22.7 21.2* 22.1   < > 23.1   BUN mg/dL 24* 24* 28* 29* 28* 27* 24*   < > 17   CREATININE mg/dL 1.63* 1.54* 1.48* 1.61* 1.83* 1.78* 1.45*   < > 1.07   GLUCOSE mg/dL 125* 76 119* 78 76 79 108*   < > 105*   ALBUMIN g/dL  --   --   --   --   --   --   --   --  3.2*    < > = values in this interval not displayed.     ABG          No radiology results for the last 7 days    Results for orders placed during the hospital encounter of 05/30/24    Adult Transthoracic Echo Complete W/ Cont if Necessary Per Protocol    Interpretation Summary    Left ventricular systolic function is hyperdynamic (EF > 70%). Calculated left ventricular EF = 75% Global longitudinal LV strain (GLS) = -17.8%. Left ventricle strain data was reviewed by the physician and found to be accurate. Global longitudinal LV strain is normal however there is regional abnormality with apical sparing suggestive of amyloid heart disease. However there is also basal ptal hypertrophy suggestive of hypertrophic cardiomyopathy. Wall motion abnormality is also noted in the basal septum and cannot rule out ischemic component.Consider additional imaging such as cardiac MRI and further evaluation also for amyloid heart disease as clinically indicated. The left ventricular cavity is small in size. Left ventricular wall thickness is consistent with moderate to severe septal asymmetric hypertrophy. There is hypokinesis of the left ventricular basal septum. Left ventricular diastolic function is consistent with (grade II w/high LAP) pseudonormalization.    The left atrial cavity is mildly dilated.    No aortic valve regurgitation or stenosis is present. The aortic valve is abnormal in structure. There is mild  thickening of the aortic valve.    There is mild, bileaflet mitral valve thickening present. Trace mitral valve regurgitation is present. No significant mitral valve stenosis is present.    Moderate tricuspid valve regurgitation is present. Estimated right ventricular systolic pressure from tricuspid regurgitation is markedly elevated (>55 mmHg). Calculated right ventricular systolic pressure from tricuspid regurgitation is 74 mmHg.        Assessment & Plan   Assessment and Plan:         Acute UTI (urinary tract infection)    Type 2 diabetes mellitus with hyperglycemia, with long-term current use of insulin    CAD (coronary artery disease)    Hx of CABG    Chronic diastolic heart failure    CSA (central sleep apnea)    HTN (hypertension)    History of stroke    CKD (chronic kidney disease) stage 3, GFR 30-59 ml/min    Peripheral arterial disease    Sepsis    Hyperkalemia    Metabolic encephalopathy    AMENA (acute kidney injury)    Paroxysmal atrial fibrillation    ASSESSMENT:  AMENA likely prerenal ATN 2/2 urosepsis with hemodynamic changes; on CKD III etiology likely diabetic and hypertensive nephropathy with baseline sCr ~0.9-1.2 mg/dL  Hyperkalemia, now resolved  UTI, urine cultures with gram neg bacilli  Leukocytosis  Metabolic encephalopathy  PAD  Hx stroke  HTN  CHF  CAD  DM2  Chronic splenic vein thrombosis  Possible hepatocellular disease/cirrhosis  Likely benign subpleural nodule with bilateral lymphadenopathy  Hiatal hernia    Last TTE 10/5/22 with EF 66%, grade II DD    PLAN :     AMENA likely prerenal ATN 2/2 urosepsis with hemodynamic changes; on CKD III etiology likely diabetic and hypertensive nephropathy with baseline sCr ~0.9-1.2 mg/dL  Creatinine fluctuations noted between 1.6-1.8 in the last week, 1.63 today  Currently on Bumex 0.5 mg daily, will continue  Volume with CXR with left base consolidation possibly pneumonia on 6/7/2024.  Electrolytes, acid/base acceptable  Followed by infectious disease,  evaluation noted, noted recommendation to discontinue cefepime and observing off of antibiotics appreciate assistance.  BP trends acceptable  Cardiology following for new onset A-fib, evaluation noted  Avoid NSAIDs, nephrotoxic agents  We will follow and coordinate with team  Overall stable from renal standpoint   stable from renal standpoint to be discharged with outpatient follow-up in 1 to 2 weeks, when medically stable       Ronald Moura MD  [facility]  6/9/2024  10:37 EDT

## 2024-06-09 NOTE — PROGRESS NOTES
"  Infectious Diseases Progress Note    Tino Nagel MD     [facility]  Los: 9 days  Patient Identification:  Name: Dilip Verma  Age: 74 y.o.  Sex: male  :  1950  MRN: [id number]         Primary Care Physician: Fernando Camargo, DO        Subjective: Confused does not engage.  Interval History: See consultation note.  2024 MRSA screen is negative.  Objective:    Scheduled Meds:amiodarone, 200 mg, Oral, Q24H  [Held by provider] apixaban, 5 mg, Oral, Q12H  aspirin, 81 mg, Oral, Daily  brimonidine, 1 drop, Both Eyes, BID  bumetanide, 0.5 mg, Intravenous, Daily  DULoxetine, 30 mg, Oral, Daily  enoxaparin, 1 mg/kg, Subcutaneous, BID  hydrALAZINE, 25 mg, Oral, Q8H  insulin lispro, 2-9 Units, Subcutaneous, 4x Daily AC & at Bedtime  latanoprost, 1 drop, Both Eyes, Nightly  metoprolol tartrate, 12.5 mg, Oral, Q12H  OLANZapine, 5 mg, Oral, Nightly  senna-docusate sodium, 2 tablet, Oral, BID  sodium chloride, 10 mL, Intravenous, Q12H  terazosin, 1 mg, Oral, Nightly  thiamine, 100 mg, Intravenous, Daily  traMADol, 50 mg, Oral, Once      Continuous Infusions:dextrose, 75 mL/hr, Last Rate: 75 mL/hr (24 1718)          Vital signs in last 24 hours:  Temp:  [98.9 °F (37.2 °C)] 98.9 °F (37.2 °C)  Heart Rate:  [80-83] 83  Resp:  [18] 18  BP: (112-182)/() 157/91    Intake/Output:    Intake/Output Summary (Last 24 hours) at 2024  Last data filed at 2024 0100  Gross per 24 hour   Intake --   Output 1200 ml   Net -1200 ml         Exam:  /91 (BP Location: Right arm, Patient Position: Lying)   Pulse 83   Temp 98.9 °F (37.2 °C) (Axillary)   Resp 18   Ht 172.7 cm (68\")   Wt 92.5 kg (204 lb)   SpO2 92%   BMI 31.02 kg/m²   Patient is examined using the personal protective equipment as per guidelines from infection control for this particular patient as enacted.  Hand washing was performed before and after patient interaction.  General Appearance: Confused                          " Head:    Normocephalic, without obvious abnormality, atraumatic                           Eyes:    PERRL, conjunctivae/corneas clear, EOM's intact, both eyes                         Throat:   Lips, tongue, gums normal; oral mucosa pink and moist                           Neck:   Supple, symmetrical, trachea midline, no JVD                         Lungs:    Clear to auscultation bilaterally, respirations unlabored                 Chest Wall:    No tenderness or deformity                          Heart:  S1-S2 regular                  Abdomen:   Soft nontender                 Extremities: left lower extremity is wrapped with residual edema of the left foot                        Pulses:   Pulses palpable in all extremities                            Skin:   Skin is warm and dry,  no rashes or palpable lesions                  Neurologic: No acute distress       Data Review:    I reviewed the patient's new clinical results.  Results from last 7 days   Lab Units 06/06/24  0620 06/03/24  0608 06/02/24  0752   WBC 10*3/mm3 10.27 7.71 7.94   HEMOGLOBIN g/dL 12.9* 13.1 13.0   PLATELETS 10*3/mm3 261 195 204     Results from last 7 days   Lab Units 06/08/24  0718 06/07/24  0651 06/06/24  1715 06/06/24  0826 06/06/24  0620 06/05/24  0654 06/04/24  0741   SODIUM mmol/L 146* 140 142 141 140 136 140   POTASSIUM mmol/L 3.5 3.8 3.9 4.0 4.1 3.9 4.1   CHLORIDE mmol/L 110* 107 106 106 106 102 106   CO2 mmol/L 23.4 21.3* 20.3* 22.7 21.2* 22.1 25.0   BUN mg/dL 24* 28* 29* 28* 27* 24* 17   CREATININE mg/dL 1.54* 1.48* 1.61* 1.83* 1.78* 1.45* 1.29*   CALCIUM mg/dL 8.8 8.5* 8.6 8.7 8.6 8.3* 8.3*   GLUCOSE mg/dL 76 119* 78 76 79 108* 129*     Microbiology Results (last 10 days)       Procedure Component Value - Date/Time    MRSA Screen, PCR (Inpatient) - Swab, Nares [[id number]]  (Normal) Collected: 06/04/24 0952    Lab Status: Final result Specimen: Swab from Nares Updated: 06/04/24 1112     MRSA PCR No MRSA Detected    Narrative:      The  negative predictive value of this diagnostic test is high and should only be used to consider de-escalating anti-MRSA therapy. A positive result may indicate colonization with MRSA and must be correlated clinically.    Eosinophil Smear - Urine, Urine, Clean Catch [[id number]]  (Normal) Collected: 05/31/24 1614    Lab Status: Final result Specimen: Urine, Clean Catch Updated: 05/31/24 1745     Eosinophil Smear 0 % EOS/100 Cells     Urine Culture - Urine, Urine, Clean Catch [[id number]]  (Abnormal)  (Susceptibility) Collected: 05/30/24 0538    Lab Status: Final result Specimen: Urine, Clean Catch Updated: 06/02/24 0935     Urine Culture 50,000 CFU/mL Serratia marcescens    Narrative:      Colonization of the urinary tract without infection is common. Treatment is discouraged unless the patient is symptomatic, pregnant, or undergoing an invasive urologic procedure.    Susceptibility        Serratia marcescens      KAT Not Specified      Amoxicillin + Clavulanate Resistant       Cefazolin Resistant       Cefepime Susceptible       Ceftazidime Susceptible       Ceftriaxone Susceptible       Gentamicin Susceptible       Levofloxacin Susceptible       Nitrofurantoin Resistant       Piperacillin + Tazobactam  Susceptible      Trimethoprim + Sulfamethoxazole Susceptible                          Susceptibility Comments       Serratia marcescens    Pip/patsy confirmed by disk diffusion.               Blood Culture - Blood, Arm, Left [[id number]]  (Normal) Collected: 05/30/24 0441    Lab Status: Final result Specimen: Blood from Arm, Left Updated: 06/04/24 0500     Blood Culture No growth at 5 days    Narrative:      Less than seven (7) mL's of blood was collected.  Insufficient quantity may yield false negative results.    Blood Culture - Blood, Arm, Left [[id number]]  (Normal) Collected: 05/30/24 0433    Lab Status: Final result Specimen: Blood from Arm, Left Updated: 06/04/24 0445     Blood Culture No growth at 5 days             Assessment:    Acute UTI (urinary tract infection)    Type 2 diabetes mellitus with hyperglycemia, with long-term current use of insulin    CAD (coronary artery disease)    Hx of CABG    Chronic diastolic heart failure    CSA (central sleep apnea)    HTN (hypertension)    History of stroke    CKD (chronic kidney disease) stage 3, GFR 30-59 ml/min    Peripheral arterial disease    Sepsis    Hyperkalemia    Metabolic encephalopathy    AMENA (acute kidney injury)    Paroxysmal atrial fibrillation  1-metabolic encephalopathy due to  2-systemic infection as a result of urinary tract infection as well as evolving sepsis traumatic cellulitis of the left leg.  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     Recommendations/Discussions:  His mental status continues to get worse.  Timeline is such that cefepime could be considered as culprit.  Discussed with Dr. Westbrook and with patient's family members that he has received at least a week worth of cefepime to address Serratia induced UTI as well as soft tissue infection of the left lower extremity due to trauma with cellulitis.  ID would like to treat him for 10 to 14 days of antibiotic treatment but because of the mental status changes and other issues and presence of atrial fibrillation further continued use of antibiotic therapy as more harm and risk to the patient than benefit gained from it.  Would recommend DC cefepime and observing off of antibiotic therapy with periodic assessment of the progression of left lower extremity cellulitis and his mental status.  Tino Nagel MD  6/8/2024  20:01 EDT    Parts of this note may be an electronic transcription/translation of spoken language to  printed text using the Dragon dictation system.

## 2024-06-09 NOTE — PLAN OF CARE
Problem: Adult Inpatient Plan of Care  Goal: Absence of Hospital-Acquired Illness or Injury  Outcome: Ongoing, Progressing  Intervention: Identify and Manage Fall Risk  Recent Flowsheet Documentation  Taken 6/9/2024 0618 by Julio Cesar Ashby RN  Safety Promotion/Fall Prevention:   room organization consistent   safety round/check completed   nonskid shoes/slippers when out of bed   lighting adjusted   fall prevention program maintained   assistive device/personal items within reach   clutter free environment maintained  Taken 6/9/2024 0400 by Julio Cesar Ashby RN  Safety Promotion/Fall Prevention:   safety round/check completed   room organization consistent   nonskid shoes/slippers when out of bed   lighting adjusted   fall prevention program maintained   clutter free environment maintained   assistive device/personal items within reach  Taken 6/9/2024 0200 by Julio Cesar Ashby RN  Safety Promotion/Fall Prevention:   safety round/check completed   room organization consistent   nonskid shoes/slippers when out of bed   lighting adjusted   fall prevention program maintained   clutter free environment maintained   assistive device/personal items within reach  Taken 6/9/2024 0000 by Julio Cesar Ashby RN  Safety Promotion/Fall Prevention:   safety round/check completed   room organization consistent   nonskid shoes/slippers when out of bed   lighting adjusted   fall prevention program maintained   clutter free environment maintained   assistive device/personal items within reach  Taken 6/8/2024 2200 by Julio Cesar Ashby, RN  Safety Promotion/Fall Prevention:   safety round/check completed   room organization consistent   nonskid shoes/slippers when out of bed   lighting adjusted   fall prevention program maintained   assistive device/personal items within reach   clutter free environment maintained  Taken 6/8/2024 2000 by Julio Cesar Ashby RN  Safety Promotion/Fall  Prevention:   safety round/check completed   room organization consistent   nonskid shoes/slippers when out of bed   lighting adjusted   fall prevention program maintained   assistive device/personal items within reach   clutter free environment maintained  Intervention: Prevent Skin Injury  Recent Flowsheet Documentation  Taken 6/9/2024 0618 by Julio Cesar Ashby RN  Body Position: supine  Taken 6/9/2024 0400 by Julio Cesar Ashby RN  Body Position:   patient/family refused   weight shifting  Taken 6/9/2024 0200 by Julio Cesar Ashby RN  Body Position:   patient/family refused   weight shifting  Taken 6/9/2024 0000 by Julio Cesar Ashby RN  Body Position:   patient/family refused   weight shifting  Skin Protection:   adhesive use limited   incontinence pads utilized   tubing/devices free from skin contact  Taken 6/8/2024 2200 by Julio Cesar Ashby RN  Body Position:   left   tilted  Taken 6/8/2024 2000 by Julio Cesar Ashby RN  Body Position: supine  Skin Protection:   adhesive use limited   incontinence pads utilized   tubing/devices free from skin contact  Intervention: Prevent and Manage VTE (Venous Thromboembolism) Risk  Recent Flowsheet Documentation  Taken 6/9/2024 0618 by Julio Cesar Ashby RN  Activity Management: activity minimized  Taken 6/9/2024 0400 by Julio Cesar Ashby RN  Activity Management: activity minimized  Taken 6/9/2024 0200 by Julio Cesar Ashby RN  Activity Management: activity minimized  Taken 6/9/2024 0000 by Julio Cesar Ashby RN  Activity Management: activity minimized  Taken 6/8/2024 2200 by Julio Cesar Ashby RN  Activity Management: activity minimized  Taken 6/8/2024 2000 by Julio Cesar Ashby RN  Activity Management: activity minimized  VTE Prevention/Management: (Lovenox) other (see comments)  Intervention: Prevent Infection  Recent Flowsheet Documentation  Taken 6/9/2024 0618 by Julio Cesar Ashby  RN  Infection Prevention:   rest/sleep promoted   personal protective equipment utilized   hand hygiene promoted   environmental surveillance performed  Taken 6/9/2024 0400 by Julio Cesar Ashby RN  Infection Prevention:   rest/sleep promoted   personal protective equipment utilized   hand hygiene promoted   environmental surveillance performed  Taken 6/9/2024 0200 by Julio Cesar Ashby RN  Infection Prevention:   rest/sleep promoted   personal protective equipment utilized   hand hygiene promoted   environmental surveillance performed  Taken 6/9/2024 0000 by Julio Cesar Ashby RN  Infection Prevention:   rest/sleep promoted   personal protective equipment utilized   hand hygiene promoted   environmental surveillance performed  Taken 6/8/2024 2200 by Julio Cesar Ashby RN  Infection Prevention:   rest/sleep promoted   personal protective equipment utilized   hand hygiene promoted   environmental surveillance performed  Taken 6/8/2024 2000 by Julio Cesar Ashby RN  Infection Prevention:   rest/sleep promoted   personal protective equipment utilized   hand hygiene promoted   environmental surveillance performed   Goal Outcome Evaluation:  Plan of Care Reviewed With: patient        Progress: no change  Outcome Evaluation: confused, agitated at times, VSS, SR on telemetry monitor, O2 at 2 lpm via NC, ongoing IV fluid infusion, oral medications given crushed on apple sauce, wife at bedside, on restraints, refused to be turned, on primofit to wall suction drain, bed alarm on, call light within reach.

## 2024-06-09 NOTE — PROGRESS NOTES
"  Infectious Diseases Progress Note    Tino Nagel MD     [facility]  Los: 10 days  Patient Identification:  Name: Dilip Verma  Age: 74 y.o.  Sex: male  :  1950  MRN: [id number]         Primary Care Physician: Fernando Camargo, DO        Subjective: Not as confused per wife and able to respond little bit better and attempts to engage.  Interval History: See consultation note.  2024 MRSA screen is negative.  Objective:    Scheduled Meds:amiodarone, 200 mg, Oral, Q24H  [Held by provider] apixaban, 5 mg, Oral, Q12H  aspirin, 81 mg, Oral, Daily  brimonidine, 1 drop, Both Eyes, BID  bumetanide, 0.5 mg, Intravenous, Daily  DULoxetine, 30 mg, Oral, Daily  enoxaparin, 1 mg/kg, Subcutaneous, BID  hydrALAZINE, 25 mg, Oral, Q8H  insulin lispro, 2-9 Units, Subcutaneous, 4x Daily AC & at Bedtime  latanoprost, 1 drop, Both Eyes, Nightly  metoprolol tartrate, 12.5 mg, Oral, Q12H  OLANZapine, 5 mg, Oral, Nightly  senna-docusate sodium, 2 tablet, Oral, BID  sodium chloride, 10 mL, Intravenous, Q12H  terazosin, 1 mg, Oral, Nightly  thiamine, 100 mg, Intravenous, Daily  traMADol, 50 mg, Oral, Once      Continuous Infusions:dextrose, 75 mL/hr, Last Rate: 75 mL/hr (24 0538)          Vital signs in last 24 hours:  Temp:  [97.7 °F (36.5 °C)-99.1 °F (37.3 °C)] 97.7 °F (36.5 °C)  Heart Rate:  [66-91] 82  Resp:  [18] 18  BP: ()/(51-91) 131/54    Intake/Output:    Intake/Output Summary (Last 24 hours) at 2024 0850  Last data filed at 2024 0300  Gross per 24 hour   Intake --   Output 1400 ml   Net -1400 ml         Exam:  /54 (BP Location: Right arm, Patient Position: Lying)   Pulse 82   Temp 97.7 °F (36.5 °C) (Oral)   Resp 18   Ht 172.7 cm (68\")   Wt 92.5 kg (204 lb)   SpO2 90%   BMI 31.02 kg/m²   Patient is examined using the personal protective equipment as per guidelines from infection control for this particular patient as enacted.  Hand washing was performed before and after " patient interaction.  General Appearance: Confused                          Head:    Normocephalic, without obvious abnormality, atraumatic                           Eyes:    PERRL, conjunctivae/corneas clear, EOM's intact, both eyes                         Throat:   Lips, tongue, gums normal; oral mucosa pink and moist                           Neck:   Supple, symmetrical, trachea midline, no JVD                         Lungs:    Clear to auscultation bilaterally, respirations unlabored                 Chest Wall:    No tenderness or deformity                          Heart:  S1-S2 regular                  Abdomen:   Soft nontender                 Extremities: left lower extremity is wrapped with residual edema of the left foot                        Pulses:   Pulses palpable in all extremities                            Skin:   Skin is warm and dry,  no rashes or palpable lesions                  Neurologic: No acute distress       Data Review:    I reviewed the patient's new clinical results.  Results from last 7 days   Lab Units 06/06/24  0620 06/03/24  0608   WBC 10*3/mm3 10.27 7.71   HEMOGLOBIN g/dL 12.9* 13.1   PLATELETS 10*3/mm3 261 195     Results from last 7 days   Lab Units 06/09/24  0612 06/08/24  0718 06/07/24  0651 06/06/24  1715 06/06/24  0826 06/06/24  0620 06/05/24  0654   SODIUM mmol/L 142 146* 140 142 141 140 136   POTASSIUM mmol/L 3.1* 3.5 3.8 3.9 4.0 4.1 3.9   CHLORIDE mmol/L 105 110* 107 106 106 106 102   CO2 mmol/L 25.0 23.4 21.3* 20.3* 22.7 21.2* 22.1   BUN mg/dL 24* 24* 28* 29* 28* 27* 24*   CREATININE mg/dL 1.63* 1.54* 1.48* 1.61* 1.83* 1.78* 1.45*   CALCIUM mg/dL 8.2* 8.8 8.5* 8.6 8.7 8.6 8.3*   GLUCOSE mg/dL 125* 76 119* 78 76 79 108*     Microbiology Results (last 10 days)       Procedure Component Value - Date/Time    MRSA Screen, PCR (Inpatient) - Swab, Nares [[id number]]  (Normal) Collected: 06/04/24 0992    Lab Status: Final result Specimen: Swab from Nares Updated: 06/04/24 1115      MRSA PCR No MRSA Detected    Narrative:      The negative predictive value of this diagnostic test is high and should only be used to consider de-escalating anti-MRSA therapy. A positive result may indicate colonization with MRSA and must be correlated clinically.    Eosinophil Smear - Urine, Urine, Clean Catch [[id number]]  (Normal) Collected: 05/31/24 1614    Lab Status: Final result Specimen: Urine, Clean Catch Updated: 05/31/24 0248     Eosinophil Smear 0 % EOS/100 Cells             Assessment:    Acute UTI (urinary tract infection)    Type 2 diabetes mellitus with hyperglycemia, with long-term current use of insulin    CAD (coronary artery disease)    Hx of CABG    Chronic diastolic heart failure    CSA (central sleep apnea)    HTN (hypertension)    History of stroke    CKD (chronic kidney disease) stage 3, GFR 30-59 ml/min    Peripheral arterial disease    Sepsis    Hyperkalemia    Metabolic encephalopathy    AMENA (acute kidney injury)    Paroxysmal atrial fibrillation  1-metabolic encephalopathy due to  2-systemic infection as a result of urinary tract infection as well as evolving sepsis traumatic cellulitis of the left leg.  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     Recommendations/Discussions:  His mental status continues to get worse.  Timeline is such that cefepime could be considered as culprit.  Discussed with Dr. Westbrook and with patient's family members that he has received at least a week worth of cefepime to address Serratia induced UTI as well as soft tissue infection of the left lower extremity due to trauma with cellulitis.  ID would like to treat him for 10 to 14 days of antibiotic treatment but because of the  mental status changes and other issues and presence of atrial fibrillation further continued use of antibiotic therapy as more harm and risk to the patient than benefit gained from it.  Would recommend DC cefepime and observing off of antibiotic therapy with periodic assessment of the progression of left lower extremity cellulitis and his mental status.  Tino Nagel MD  6/9/2024  08:50 EDT    Parts of this note may be an electronic transcription/translation of spoken language to printed text using the Dragon dictation system.

## 2024-06-09 NOTE — PLAN OF CARE
Problem: Adult Inpatient Plan of Care  Goal: Plan of Care Review  6/9/2024 1444 by Ruma Roberts RN  Flowsheets (Taken 6/9/2024 1444)  Outcome Evaluation: Alert to self, Restraints, Bed alarm on, IV fluids. ACHS, Meds crushed in applesauce. Inc of B[employer], Q2 turn, Bed alarm on. Wife at bedside  6/9/2024 1326 by Ruma Roberts RN  Outcome: Ongoing, Not Progressing  Goal: Patient-Specific Goal (Individualized)  Outcome: Ongoing, Not Progressing  Goal: Absence of Hospital-Acquired Illness or Injury  Outcome: Ongoing, Not Progressing  Intervention: Identify and Manage Fall Risk  Recent Flowsheet Documentation  Taken 6/9/2024 1400 by Ruma Roberts RN  Safety Promotion/Fall Prevention:   nonskid shoes/slippers when out of bed   safety round/check completed  Taken 6/9/2024 1200 by Ruma Roberts RN  Safety Promotion/Fall Prevention:   safety round/check completed   nonskid shoes/slippers when out of bed  Taken 6/9/2024 1000 by Ruma Roberts RN  Safety Promotion/Fall Prevention:   safety round/check completed   nonskid shoes/slippers when out of bed  Taken 6/9/2024 0800 by Ruma Roberts RN  Safety Promotion/Fall Prevention: safety round/check completed  Intervention: Prevent Skin Injury  Recent Flowsheet Documentation  Taken 6/9/2024 1400 by Ruma Roberts RN  Body Position:   turned   left  Skin Protection:   adhesive use limited   tubing/devices free from skin contact  Taken 6/9/2024 1200 by Ruma Roberts RN  Body Position:   turned   left  Taken 6/9/2024 1000 by Ruma Roberts RN  Body Position:   turned   right  Taken 6/9/2024 0800 by Ruma Roberts RN  Body Position:   turned   right  Intervention: Prevent and Manage VTE (Venous Thromboembolism) Risk  Recent Flowsheet Documentation  Taken 6/9/2024 1400 by Ruma Roberts RN  Activity Management: activity minimized  Taken 6/9/2024 1200 by Ruma Roberts RN  Activity Management: activity minimized  Taken 6/9/2024 1000 by Armando  LUC Hendrix  Activity Management: activity minimized  Taken 6/9/2024 0800 by Ruma Roberts RN  Activity Management: activity minimized  VTE Prevention/Management: (Lovenox) other (see comments)  Goal: Optimal Comfort and Wellbeing  Outcome: Ongoing, Not Progressing  Intervention: Provide Person-Centered Care  Recent Flowsheet Documentation  Taken 6/9/2024 1400 by Ruma Roberts RN  Trust Relationship/Rapport:   choices provided   care explained  Taken 6/9/2024 0800 by Ruma Roberts RN  Trust Relationship/Rapport:   choices provided   care explained  Goal: Readiness for Transition of Care  Outcome: Ongoing, Not Progressing   Goal Outcome Evaluation:              Outcome Evaluation: Alert to self, Restraints, Bed alarm on, IV fluids. ACHS, Meds crushed in applesauce. Inc of B[employer]B, Q2 turn, Bed alarm on. Wife at bedside

## 2024-06-09 NOTE — PROGRESS NOTES
Name: Dilip Verma ADMIT: 2024   : 1950  PCP: Fernando Camargo DO    MRN: [id number] LOS: 10 days   AGE/SEX: 74 y.o. male  ROOM: [facility]     Subjective   Subjective   No acute events. Patient did a little better overnight. He was able to eat some applesauce. He is still resistant to some aspects of care. His wife is at bedside.    Objective   Objective   Vital Signs  Temp:  [97.7 °F (36.5 °C)-99.1 °F (37.3 °C)] 97.7 °F (36.5 °C)  Heart Rate:  [66-91] 82  Resp:  [18] 18  BP: ()/(51-91) 131/54  SpO2:  [84 %-99 %] 90 %  on  Flow (L/min):  [2-3] 2;   Device (Oxygen Therapy): humidified;nasal cannula  Body mass index is 31.02 kg/m².  Physical Exam  Vitals and nursing note reviewed.   Constitutional:       General: He is not in acute distress.     Appearance: He is ill-appearing (chronically). He is not toxic-appearing or diaphoretic.   HENT:      Head: Normocephalic and atraumatic.      Nose: Nose normal.      Mouth/Throat:      Mouth: Mucous membranes are moist.      Pharynx: Oropharynx is clear.   Eyes:      Conjunctiva/sclera: Conjunctivae normal.      Pupils: Pupils are equal, round, and reactive to light.   Cardiovascular:      Rate and Rhythm: Normal rate and regular rhythm.      Pulses: Normal pulses.   Pulmonary:      Effort: Pulmonary effort is normal.      Breath sounds: Normal breath sounds.   Abdominal:      General: Bowel sounds are normal.      Palpations: Abdomen is soft.      Tenderness: There is no abdominal tenderness.   Musculoskeletal:         General: Swelling (1-2+ BLE) present.      Cervical back: Neck supple.   Skin:     General: Skin is warm and dry.   Neurological:      Mental Status: He is lethargic.      Comments: myoclonic jerks improved       Results Review     I reviewed the patient's new clinical results.  Results from last 7 days   Lab Units 24  0620 24  0608   WBC 10*3/mm3 10.27 7.71   HEMOGLOBIN g/dL 12.9* 13.1   PLATELETS 10*3/mm3 261 195     Results  from last 7 days   Lab Units 06/09/24  0612 06/08/24  0718 06/07/24  0651 06/06/24  1715   SODIUM mmol/L 142 146* 140 142   POTASSIUM mmol/L 3.1* 3.5 3.8 3.9   CHLORIDE mmol/L 105 110* 107 106   CO2 mmol/L 25.0 23.4 21.3* 20.3*   BUN mg/dL 24* 24* 28* 29*   CREATININE mg/dL 1.63* 1.54* 1.48* 1.61*   GLUCOSE mg/dL 125* 76 119* 78   EGFR mL/min/1.73 43.7* 46.7* 49.0* 44.3*     Results from last 7 days   Lab Units 06/03/24  0608   ALBUMIN g/dL 3.2*     Results from last 7 days   Lab Units 06/09/24  0612 06/08/24  0718 06/07/24  0651 06/06/24  1715 06/05/24  0654 06/04/24  0741 06/03/24  0608   CALCIUM mg/dL 8.2* 8.8 8.5* 8.6   < > 8.3* 8.6   ALBUMIN g/dL  --   --   --   --   --   --  3.2*   MAGNESIUM mg/dL  --   --   --   --   --  2.0 2.0   PHOSPHORUS mg/dL  --   --   --   --   --   --  3.1    < > = values in this interval not displayed.       Glucose   Date/Time Value Ref Range Status   06/09/2024 0607 115 70 - 130 mg/dL Final   06/08/2024 2034 115 70 - 130 mg/dL Final   06/08/2024 1759 135 (H) 70 - 130 mg/dL Final   06/08/2024 1532 70 70 - 130 mg/dL Final   06/08/2024 1128 98 70 - 130 mg/dL Final   06/08/2024 0914 60 (L) 70 - 130 mg/dL Final   06/08/2024 0603 77 70 - 130 mg/dL Final       No radiology results for the last day    I have personally reviewed all medications:  Scheduled Medications  amiodarone, 200 mg, Oral, Q24H  [Held by provider] apixaban, 5 mg, Oral, Q12H  aspirin, 81 mg, Oral, Daily  brimonidine, 1 drop, Both Eyes, BID  bumetanide, 0.5 mg, Intravenous, Daily  DULoxetine, 30 mg, Oral, Daily  enoxaparin, 1 mg/kg, Subcutaneous, BID  hydrALAZINE, 25 mg, Oral, Q8H  insulin lispro, 2-9 Units, Subcutaneous, 4x Daily AC & at Bedtime  latanoprost, 1 drop, Both Eyes, Nightly  metoprolol tartrate, 12.5 mg, Oral, Q12H  OLANZapine, 5 mg, Oral, Nightly  senna-docusate sodium, 2 tablet, Oral, BID  sodium chloride, 10 mL, Intravenous, Q12H  terazosin, 1 mg, Oral, Nightly  thiamine, 100 mg, Intravenous,  Daily  traMADol, 50 mg, Oral, Once    Infusions  dextrose, 75 mL/hr, Last Rate: 75 mL/hr (06/09/24 0538)    Diet  Diet: Regular/House, Diabetic, Cardiac; Healthy Heart (2-3 Na+); Consistent Carbohydrate; Texture: Regular (IDDSI 7); Fluid Consistency: Thin (IDDSI 0)    I have personally reviewed:  [x]  Laboratory   []  Microbiology   []  Radiology   [x]  EKG/Telemetry  []  Cardiology/Vascular   []  Pathology    []  Records    Assessment/Plan     Active Hospital Problems    Diagnosis  POA    **Acute UTI (urinary tract infection) [N39.0]  Yes    Paroxysmal atrial fibrillation [I48.0]  Yes    AMENA (acute kidney injury) [N17.9]  Yes    Sepsis [A41.9]  Yes    Hyperkalemia [E87.5]  Yes    Metabolic encephalopathy [G93.41]  Yes    Peripheral arterial disease [I73.9]  Yes    History of stroke [Z86.73]  Not Applicable    CKD (chronic kidney disease) stage 3, GFR 30-59 ml/min [N18.30]  Yes    HTN (hypertension) [I10]  Yes    CSA (central sleep apnea) [G47.31]  Yes    Chronic diastolic heart failure [I50.32]  Yes    Hx of CABG [Z95.1]  Not Applicable    CAD (coronary artery disease) [I25.10]  Yes    Type 2 diabetes mellitus with hyperglycemia, with long-term current use of insulin [E11.65, Z79.4]  Not Applicable      Resolved Hospital Problems   No resolved problems to display.   Acute UTI/LLE Cellulitis  - urine culture grew Serratia  - completed 9d of abx-cefepime was discontinued 6/8 due to high suspicion of this causing toxic encephalopathy-I d/w Dr. Nagel  - doing well off of abx  - appreciate ID recs    HTNCAD/PAD/New Onset Afib (PAF)/Chronic Diastolic CHF  - BP acceptable, no anginal symptoms, in NSR  - continue on current regimen  - on AC with eliquis but not taking PO now due to severe encephalopathy-hold eliquis and ask pharmacy to dose lovenox  - appreciate cardiology recs    Type 2 DM  - had hypoglycemia, improved off of lantus  - continue D5W, getting thiamine also  - continue coverage with ssi/hypoglycemia  protocol    AMENA  - likely pre-renal with ATN  - overall stable  - continue on bumex-continue IV for now  - appreciate nephrology recs    Hypoxia  - CXR noted, improved after additional dose of bumex  - monitor and encourage pulmonary toilet as able    Delirium/Toxic Encephalopathy  - continue on scheduled and PRN zyprexa  - he is current with palliatus at home and takes PRN tramadol, this has been restarted  - I suspect that much of this was due to cefepime, this has been discontinued    Hypokalemia  - replace K+     Pharmacy to dose Lovenox for DVT prophylaxis.  Full code.  Discussed with patient, spouse, and nursing staff.  Anticipate discharge home with family next week.  Expected Discharge Date: 6/10/2024; Expected Discharge Time:       Darci Westbrook MD  [facility]  06/09/24  11:38 EDT    Portions of this text have been copied and I have reviewed them. They are accurate as of 6/9/2024

## 2024-06-10 LAB
ANION GAP SERPL CALCULATED.3IONS-SCNC: 10.9 MMOL/L (ref 5–15)
BUN SERPL-MCNC: 23 MG/DL (ref 8–23)
BUN/CREAT SERPL: 9.8 (ref 7–25)
CALCIUM SPEC-SCNC: 8 MG/DL (ref 8.6–10.5)
CHLORIDE SERPL-SCNC: 102 MMOL/L (ref 98–107)
CO2 SERPL-SCNC: 23.1 MMOL/L (ref 22–29)
CREAT SERPL-MCNC: 2.34 MG/DL (ref 0.76–1.27)
EGFRCR SERPLBLD CKD-EPI 2021: 28.3 ML/MIN/1.73
GLUCOSE BLDC GLUCOMTR-MCNC: 129 MG/DL (ref 70–130)
GLUCOSE BLDC GLUCOMTR-MCNC: 152 MG/DL (ref 70–130)
GLUCOSE BLDC GLUCOMTR-MCNC: 156 MG/DL (ref 70–130)
GLUCOSE BLDC GLUCOMTR-MCNC: 174 MG/DL (ref 70–130)
GLUCOSE SERPL-MCNC: 155 MG/DL (ref 65–99)
POTASSIUM SERPL-SCNC: 3.3 MMOL/L (ref 3.5–5.2)
SODIUM SERPL-SCNC: 136 MMOL/L (ref 136–145)
WHOLE BLOOD HOLD SPECIMEN: NORMAL

## 2024-06-10 PROCEDURE — 82948 REAGENT STRIP/BLOOD GLUCOSE: CPT

## 2024-06-10 PROCEDURE — 80048 BASIC METABOLIC PNL TOTAL CA: CPT

## 2024-06-10 PROCEDURE — 97530 THERAPEUTIC ACTIVITIES: CPT

## 2024-06-10 PROCEDURE — 25010000002 ENOXAPARIN PER 10 MG: Performed by: INTERNAL MEDICINE

## 2024-06-10 PROCEDURE — 63710000001 INSULIN LISPRO (HUMAN) PER 5 UNITS: Performed by: INTERNAL MEDICINE

## 2024-06-10 PROCEDURE — 25010000002 THIAMINE HCL 200 MG/2ML SOLUTION: Performed by: INTERNAL MEDICINE

## 2024-06-10 PROCEDURE — 0 DEXTROSE 5 % SOLUTION: Performed by: INTERNAL MEDICINE

## 2024-06-10 RX ORDER — POTASSIUM CHLORIDE 750 MG/1
40 TABLET, FILM COATED, EXTENDED RELEASE ORAL ONCE
Status: COMPLETED | OUTPATIENT
Start: 2024-06-10 | End: 2024-06-10

## 2024-06-10 RX ADMIN — AMIODARONE HYDROCHLORIDE 200 MG: 200 TABLET ORAL at 11:34

## 2024-06-10 RX ADMIN — POTASSIUM CHLORIDE 40 MEQ: 750 TABLET, EXTENDED RELEASE ORAL at 18:35

## 2024-06-10 RX ADMIN — BRIMONIDINE TARTRATE 1 DROP: 2 SOLUTION OPHTHALMIC at 11:41

## 2024-06-10 RX ADMIN — ASPIRIN 81 MG: 81 TABLET, COATED ORAL at 22:27

## 2024-06-10 RX ADMIN — DEXTROSE MONOHYDRATE 75 ML/HR: 50 INJECTION, SOLUTION INTRAVENOUS at 04:59

## 2024-06-10 RX ADMIN — THIAMINE HYDROCHLORIDE 100 MG: 100 INJECTION, SOLUTION INTRAMUSCULAR; INTRAVENOUS at 11:34

## 2024-06-10 RX ADMIN — TERAZOSIN HYDROCHLORIDE 1 MG: 1 CAPSULE ORAL at 22:27

## 2024-06-10 RX ADMIN — ENOXAPARIN SODIUM 90 MG: 100 INJECTION SUBCUTANEOUS at 05:03

## 2024-06-10 RX ADMIN — INSULIN LISPRO 2 UNITS: 100 INJECTION, SOLUTION INTRAVENOUS; SUBCUTANEOUS at 11:33

## 2024-06-10 RX ADMIN — TRAMADOL HYDROCHLORIDE 50 MG: 50 TABLET ORAL at 05:04

## 2024-06-10 RX ADMIN — Medication 10 ML: at 11:41

## 2024-06-10 RX ADMIN — TRAMADOL HYDROCHLORIDE 50 MG: 50 TABLET ORAL at 22:27

## 2024-06-10 RX ADMIN — METOPROLOL TARTRATE 12.5 MG: 25 TABLET, FILM COATED ORAL at 11:34

## 2024-06-10 RX ADMIN — ENOXAPARIN SODIUM 90 MG: 100 INJECTION SUBCUTANEOUS at 18:36

## 2024-06-10 RX ADMIN — HYDRALAZINE HYDROCHLORIDE 25 MG: 25 TABLET ORAL at 05:03

## 2024-06-10 RX ADMIN — HYDRALAZINE HYDROCHLORIDE 25 MG: 25 TABLET ORAL at 11:33

## 2024-06-10 RX ADMIN — METOPROLOL TARTRATE 12.5 MG: 25 TABLET, FILM COATED ORAL at 22:27

## 2024-06-10 RX ADMIN — OLANZAPINE 5 MG: 5 TABLET, FILM COATED ORAL at 22:27

## 2024-06-10 RX ADMIN — SENNOSIDES AND DOCUSATE SODIUM 2 TABLET: 50; 8.6 TABLET ORAL at 11:34

## 2024-06-10 NOTE — PROGRESS NOTES
Name: Dilip Verma ADMIT: 2024   : 1950  PCP: Fernando Camargo DO    MRN: [id number] LOS: 11 days   AGE/SEX: 74 y.o. male  ROOM: [facility]     Subjective   Subjective   No acute events. Patient is overall doing better. He is still confused but now more interactive and is taking more PO. His wife is at bedside.    Objective   Objective   Vital Signs  Temp:  [98.1 °F (36.7 °C)-98.8 °F (37.1 °C)] 98.1 °F (36.7 °C)  Heart Rate:  [78-92] 81  Resp:  [18] 18  BP: (106-141)/(53-71) 130/66  SpO2:  [88 %-94 %] 94 %  on  Flow (L/min):  [2] 2;   Device (Oxygen Therapy): humidified;nasal cannula  Body mass index is 31.02 kg/m².  Physical Exam  Vitals and nursing note reviewed.   Constitutional:       General: He is not in acute distress.     Appearance: He is ill-appearing (chronically). He is not toxic-appearing or diaphoretic.   HENT:      Head: Normocephalic and atraumatic.      Nose: Nose normal.      Mouth/Throat:      Mouth: Mucous membranes are moist.      Pharynx: Oropharynx is clear.   Eyes:      Conjunctiva/sclera: Conjunctivae normal.      Pupils: Pupils are equal, round, and reactive to light.   Cardiovascular:      Rate and Rhythm: Normal rate and regular rhythm.      Pulses: Normal pulses.   Pulmonary:      Effort: Pulmonary effort is normal.      Breath sounds: Normal breath sounds.   Abdominal:      General: Bowel sounds are normal.      Palpations: Abdomen is soft.      Tenderness: There is no abdominal tenderness.   Musculoskeletal:         General: Swelling (1-2+ BLE) present.      Cervical back: Neck supple.   Skin:     General: Skin is warm and dry.       Results Review     I reviewed the patient's new clinical results.  Results from last 7 days   Lab Units 24  0620   WBC 10*3/mm3 10.27   HEMOGLOBIN g/dL 12.9*   PLATELETS 10*3/mm3 261     Results from last 7 days   Lab Units 06/10/24  0701 24  0612 24  0718 24  0651   SODIUM mmol/L 136 142 146* 140   POTASSIUM mmol/L  3.3* 3.1* 3.5 3.8   CHLORIDE mmol/L 102 105 110* 107   CO2 mmol/L 23.1 25.0 23.4 21.3*   BUN mg/dL 23 24* 24* 28*   CREATININE mg/dL 2.34* 1.63* 1.54* 1.48*   GLUCOSE mg/dL 155* 125* 76 119*   EGFR mL/min/1.73 28.3* 43.7* 46.7* 49.0*           Results from last 7 days   Lab Units 06/10/24  0701 06/09/24  0612 06/08/24  0718 06/07/24  0651 06/05/24  0654 06/04/24  0741   CALCIUM mg/dL 8.0* 8.2* 8.8 8.5*   < > 8.3*   MAGNESIUM mg/dL  --   --   --   --   --  2.0    < > = values in this interval not displayed.       Glucose   Date/Time Value Ref Range Status   06/10/2024 1111 174 (H) 70 - 130 mg/dL Final   06/10/2024 0632 152 (H) 70 - 130 mg/dL Final   06/09/2024 2031 133 (H) 70 - 130 mg/dL Final   06/09/2024 1644 137 (H) 70 - 130 mg/dL Final   06/09/2024 1156 132 (H) 70 - 130 mg/dL Final   06/09/2024 0607 115 70 - 130 mg/dL Final   06/08/2024 2034 115 70 - 130 mg/dL Final       No radiology results for the last day    I have personally reviewed all medications:  Scheduled Medications  amiodarone, 200 mg, Oral, Q24H  [Held by provider] apixaban, 5 mg, Oral, Q12H  aspirin, 81 mg, Oral, Daily  brimonidine, 1 drop, Both Eyes, BID  DULoxetine, 30 mg, Oral, Daily  enoxaparin, 1 mg/kg, Subcutaneous, BID  hydrALAZINE, 25 mg, Oral, Q8H  insulin lispro, 2-9 Units, Subcutaneous, 4x Daily AC & at Bedtime  latanoprost, 1 drop, Both Eyes, Nightly  metoprolol tartrate, 12.5 mg, Oral, Q12H  OLANZapine, 5 mg, Oral, Nightly  senna-docusate sodium, 2 tablet, Oral, BID  sodium chloride, 10 mL, Intravenous, Q12H  terazosin, 1 mg, Oral, Nightly  thiamine, 100 mg, Intravenous, Daily  traMADol, 50 mg, Oral, Once    Infusions  dextrose, 75 mL/hr, Last Rate: 75 mL/hr (06/10/24 1003)    Diet  Diet: Regular/House, Diabetic, Cardiac; Healthy Heart (2-3 Na+); Consistent Carbohydrate; Texture: Regular (IDDSI 7); Fluid Consistency: Thin (IDDSI 0)    I have personally reviewed:  [x]  Laboratory   []  Microbiology   []  Radiology   [x]   EKG/Telemetry  []  Cardiology/Vascular   []  Pathology    []  Records    Assessment/Plan     Active Hospital Problems    Diagnosis  POA    **Acute UTI (urinary tract infection) [N39.0]  Yes    Paroxysmal atrial fibrillation [I48.0]  Yes    AMENA (acute kidney injury) [N17.9]  Yes    Sepsis [A41.9]  Yes    Hyperkalemia [E87.5]  Yes    Metabolic encephalopathy [G93.41]  Yes    Peripheral arterial disease [I73.9]  Yes    History of stroke [Z86.73]  Not Applicable    CKD (chronic kidney disease) stage 3, GFR 30-59 ml/min [N18.30]  Yes    HTN (hypertension) [I10]  Yes    CSA (central sleep apnea) [G47.31]  Yes    Chronic diastolic heart failure [I50.32]  Yes    Hx of CABG [Z95.1]  Not Applicable    CAD (coronary artery disease) [I25.10]  Yes    Type 2 diabetes mellitus with hyperglycemia, with long-term current use of insulin [E11.65, Z79.4]  Not Applicable      Resolved Hospital Problems   No resolved problems to display.   Acute UTI/LLE Cellulitis  - urine culture grew Serratia  - completed 9d of abx-cefepime was discontinued 6/8 due to high suspicion of this causing toxic encephalopathy-I d/w Dr. Nagel  - doing well off of abx  - appreciate ID recs    HTNCAD/PAD/New Onset Afib (PAF)/Chronic Diastolic CHF  - BP acceptable, no anginal symptoms, in NSR  - continue on current regimen  - on AC with eliquis but not taking PO now due to severe encephalopathy-hold eliquis and ask pharmacy to dose lovenox  - appreciate cardiology recs    Type 2 DM  - had hypoglycemia, improved off of lantus  - continue D5W, getting thiamine also  - continue coverage with ssi/hypoglycemia protocol    AMENA  - likely pre-renal with ATN  - worse today, bumex stopped and getting IV D5  - appreciate nephrology recs    Hypoxia  - CXR noted, improved after additional dose of bumex  - monitor and encourage pulmonary toilet as able    Delirium/Toxic Encephalopathy  - continue on scheduled and PRN zyprexa  - he is current with palliatus at home and takes  PRN tramadol, this has been restarted  - I suspect that much of this was due to cefepime, this has been discontinued    Hypokalemia  - replace K+     Pharmacy to dose Lovenox for DVT prophylaxis.  Full code.  Discussed with patient, spouse, and nursing staff.  Anticipate discharge home with family later this week.  Expected Discharge Date: 6/12/2024; Expected Discharge Time:       Draci Westbrook MD  [facility]  06/10/24  12:29 EDT    Portions of this text have been copied and I have reviewed them. They are accurate as of 6/10/2024

## 2024-06-10 NOTE — PLAN OF CARE
Problem: Adult Inpatient Plan of Care  Goal: Plan of Care Review  Outcome: Ongoing, Progressing  Flowsheets (Taken 6/10/2024 0632)  Progress: improving  Plan of Care Reviewed With:   spouse   patient  Goal: Patient-Specific Goal (Individualized)  Outcome: Ongoing, Progressing  Goal: Absence of Hospital-Acquired Illness or Injury  Outcome: Ongoing, Progressing  Intervention: Identify and Manage Fall Risk  Recent Flowsheet Documentation  Taken 6/9/2024 2000 by Ginna Cedeno RN  Safety Promotion/Fall Prevention:   safety round/check completed   nonskid shoes/slippers when out of bed   lighting adjusted   fall prevention program maintained   clutter free environment maintained   activity supervised  Intervention: Prevent Skin Injury  Recent Flowsheet Documentation  Taken 6/9/2024 2000 by Ginna Cedeno RN  Body Position: supine, legs elevated  Skin Protection:   adhesive use limited   tubing/devices free from skin contact   transparent dressing maintained  Intervention: Prevent and Manage VTE (Venous Thromboembolism) Risk  Recent Flowsheet Documentation  Taken 6/9/2024 2000 by Ginna Cedeno RN  Activity Management: activity minimized  VTE Prevention/Management: (lovenox) other (see comments)  Range of Motion: active ROM (range of motion) encouraged  Intervention: Prevent Infection  Recent Flowsheet Documentation  Taken 6/9/2024 2000 by Ginna Cedeno RN  Infection Prevention:   visitors restricted/screened   single patient room provided   rest/sleep promoted   personal protective equipment utilized   hand hygiene promoted  Goal: Optimal Comfort and Wellbeing  Outcome: Ongoing, Progressing  Intervention: Monitor Pain and Promote Comfort  Recent Flowsheet Documentation  Taken 6/9/2024 2000 by Ginna Cedeno RN  Pain Management Interventions:   position adjusted   pillow support provided   diversional activity provided   care clustered  Intervention: Provide Person-Centered Care  Recent Flowsheet  Documentation  Taken 6/9/2024 2000 by Ginna Cedeno RN  Trust Relationship/Rapport:   emotional support provided   care explained  Goal: Readiness for Transition of Care  Outcome: Ongoing, Progressing     Problem: Diabetes Comorbidity  Goal: Blood Glucose Level Within Targeted Range  Outcome: Ongoing, Progressing  Intervention: Monitor and Manage Glycemia  Recent Flowsheet Documentation  Taken 6/9/2024 2000 by Ginna Cedeno RN  Glycemic Management: blood glucose monitored     Problem: Heart Failure Comorbidity  Goal: Maintenance of Heart Failure Symptom Control  Outcome: Ongoing, Progressing  Intervention: Maintain Heart Failure-Management  Recent Flowsheet Documentation  Taken 6/9/2024 2000 by Ginna Cedeno RN  Medication Review/Management: medications reviewed     Problem: Hypertension Comorbidity  Goal: Blood Pressure in Desired Range  Outcome: Ongoing, Progressing  Intervention: Maintain Blood Pressure Management  Recent Flowsheet Documentation  Taken 6/9/2024 2000 by Ginna Cedeno RN  Medication Review/Management: medications reviewed     Problem: Obstructive Sleep Apnea Risk or Actual Comorbidity Management  Goal: Unobstructed Breathing During Sleep  Outcome: Ongoing, Progressing     Problem: Skin Injury Risk Increased  Goal: Skin Health and Integrity  Outcome: Ongoing, Progressing  Intervention: Promote and Optimize Oral Intake  Recent Flowsheet Documentation  Taken 6/9/2024 2000 by Ginna Cedeno RN  Oral Nutrition Promotion:   medicated   rest periods promoted  Intervention: Optimize Skin Protection  Recent Flowsheet Documentation  Taken 6/9/2024 2000 by Ginna Cedeno RN  Pressure Reduction Techniques:   frequent weight shift encouraged   rest period provided between sit times   weight shift assistance provided  Head of Bed (HOB) Positioning: HOB elevated  Pressure Reduction Devices: pressure-redistributing mattress utilized  Skin Protection:   adhesive use limited   tubing/devices  free from skin contact   transparent dressing maintained     Problem: Fall Injury Risk  Goal: Absence of Fall and Fall-Related Injury  Outcome: Ongoing, Progressing  Intervention: Identify and Manage Contributors  Recent Flowsheet Documentation  Taken 6/9/2024 2000 by Ginna Cedeno RN  Medication Review/Management: medications reviewed  Intervention: Promote Injury-Free Environment  Recent Flowsheet Documentation  Taken 6/9/2024 2000 by Ginna Cedeno RN  Safety Promotion/Fall Prevention:   safety round/check completed   nonskid shoes/slippers when out of bed   lighting adjusted   fall prevention program maintained   clutter free environment maintained   activity supervised     Problem: Restraint, Nonviolent  Goal: Absence of Harm or Injury  Outcome: Ongoing, Progressing  Intervention: Implement Least Restrictive Safety Strategies  Recent Flowsheet Documentation  Taken 6/9/2024 2000 by Ginna Cedeno RN  Medical Device Protection:   tubing secured   IV pole/bag removed from visual field  Less Restrictive Alternative:   bed alarm in use   surveillance provided   sensory stimulation limited   family presence promoted   calming techniques promoted   environment adjusted   safety enhancements provided  De-Escalation Techniques:   verbally redirected   stimulation decreased   reoriented   increased round frequency  Diversional Activities: television  Intervention: Protect Dignity, Rights, and Personal Wellbeing  Recent Flowsheet Documentation  Taken 6/9/2024 2000 by Ginna Cedeno RN  Trust Relationship/Rapport:   emotional support provided   care explained  Intervention: Protect Skin and Joint Integrity  Recent Flowsheet Documentation  Taken 6/9/2024 2000 by Ginna Cedeno RN  Body Position: supine, legs elevated  Range of Motion: active ROM (range of motion) encouraged   Goal Outcome Evaluation:  Plan of Care Reviewed With: spouse, patient        Progress: improving

## 2024-06-10 NOTE — NURSING NOTE
Wound/Ostomy: We see patient at the request of the floor nurse regarding skin issue on rt lower leg. Once examined is observed partial-thickness skin loss on rt lower leg, serous drainage of scant amount, d/t trauma, family at bedside stated the kitting his shin againt the bed rail. The area is cleaned, Vaseline gauze, ABD pad applied, wrapped Kerlix.  In addition the family  said that in the past he had had an abscess in the region of the Coccyx, but now only is red, blanchable, possibly related to moisture, Calmoseptine was ordered.  He is at risk for further skin problems, is completely bedridden, contracted,  impaired mobility and is incontinent, repositioning him every two hours and minimizing any skin contact with urine or stool would be beneficial.  Bilateral Heels are with intact skin and normal coloration, protective padding should be used to facilitate cushioning. The heels must remains off-loaded.  Wound care order and pressure ulcer preventives  measures have been implemented into Epic.   Low air loss mattress for adequate pressure redistribution and pressure relief from Agility and frame from [facility] will be requested, the unit secretary  will call.  Please re-consult for any additional needs.

## 2024-06-10 NOTE — PLAN OF CARE
Goal Outcome Evaluation:  Plan of Care Reviewed With: (P) patient, spouse        Progress: (P) no change  Outcome Evaluation: (P) Pt seen for OT treatment this date. Pt sleeping in bed with wrist restraints upon arrival and difficult to arouse. Pt oriented to self only, on 2L NC, and very resistive to therapy. Pt demo'd max A x2 bed mobility and min A x2 STS transfer with rwx. Pt was unable to initiate/sequence side steps at EOB and unable to follow commands. Overall, pt cognitive status is main barrier to function and presents with poor activity tolerance and impaired (I) with ADLs. Pt is currently dep for all ADL's. OT services would benefit pt to address noted deficits. Rec d/c SNF based on current cognitive status      Anticipated Discharge Disposition (OT): (P) skilled nursing facility

## 2024-06-10 NOTE — THERAPY TREATMENT NOTE
Patient Name: Dilip Verma  : 1950    MRN: [id number]                              Today's Date: 6/10/2024       Admit Date: 2024    Visit Dx:     ICD-10-CM ICD-9-CM   1. Acute respiratory failure with hypoxia  J96.01 518.81   2. Sepsis without acute organ dysfunction, due to unspecified organism  A41.9 038.9     995.91   3. Hyperkalemia  E87.5 276.7   4. Metabolic encephalopathy  G93.41 348.31   5. Acute UTI  N39.0 599.0   6. Hyperglycemia due to diabetes mellitus  E11.65 250.02   7. Chronic heart failure with preserved ejection fraction (HFpEF)  I50.32 428.9   8. Acute UTI (urinary tract infection)  N39.0 599.0     Patient Active Problem List   Diagnosis    Anxiety    Colon polyp    Type 2 diabetes mellitus with hyperglycemia, with long-term current use of insulin    Erectile dysfunction    Hyperlipidemia    Type 2 acute myocardial infarction    CAD (coronary artery disease)    Hx of CABG    Chronic diastolic heart failure    Hypersomnia due to medical condition    CSA (central sleep apnea)    Periodic breathing    HTN (hypertension)    History of stroke    CKD (chronic kidney disease) stage 3, GFR 30-59 ml/min    Urinary retention    Acute on chronic renal failure    Acute UTI (urinary tract infection)    Acute on chronic diastolic (congestive) heart failure    Bacteriuria    Rectal pain    Status post total replacement of hip    Vitamin D deficiency    Post-acute COVID-19 syndrome    Insulin dose changed    Arthropathy associated with neurological disorder    Personal history of colonic polyps    Type 2 diabetes mellitus with diabetic neuropathy, with long-term current use of insulin    Cervical spinal stenosis    Abscess of skin    Overweight    Cervical stenosis of spine    Spinal stenosis, lumbar region, without neurogenic claudication    Medically noncompliant    Chronic heart failure with preserved ejection fraction (HFpEF)    Carotid stenosis    Peripheral arterial disease    Sepsis     Hyperkalemia    Metabolic encephalopathy    AMENA (acute kidney injury)    Paroxysmal atrial fibrillation     Past Medical History:   Diagnosis Date    AMENA (acute kidney injury) 12/03/2020    Alcoholism 1989    not since 1998    CORI positive     Anemia     Anxiety     Ataxia     Atypical chest pain 04/19/2022    SEEN AT [facility] ER    Balance disorder     Carotid stenosis 3/28/2024    Cataract     BILATERAL, S/P EXTRACTION    Cervical radiculopathy 11/11/2019    SEEN AT  [facility] ER    Cervical spinal cord compression     Chronic diastolic (congestive) heart failure     Chronic kidney disease     STAGE 3, FOLLOWED BY DR. VIVAR    Chronic pancreatitis     Closed left subtrochanteric femur fracture 12/01/2020    ADMITTED TO [facility]    Closed nondisplaced intertrochanteric fracture of left femur 12/01/2020    ADMITTED TO [facility]    Colon polyps     FOLLOWED BY DR. AVTAR JEONG    Constipation     Contracture, right hand     Coronary artery disease     CABG 7/2019    COVID-19 07/2022    DDD (degenerative disc disease), cervical     Diabetes mellitus, type II     IDDM    Diabetic retinopathy     Difficulty walking     Dysphagia     Elevated brain natriuretic peptide (BNP) level 08/2014    Elevated LFTs 03/2021    Erectile dysfunction     Fissure, anal 2022    Foot drop     Fuchs' corneal dystrophy of right eye     Glaucoma     BILATERAL    History of alcohol abuse     Hyperlipidemia     Hypersomnia     Hypertension     Hypertensive urgency 02/25/2020    ADMITTED TO [facility]    Insomnia     Kidney stones     LV dysfunction 06/2016    Lyme disease     Lymphadenopathy syndrome 05/2021    Macular edema     BILATERAL    Myocardial infarction 11/05/2019    NSTEMI, ADMITTED TO [facility]    Myocardial infarction 07/12/2019    NSTEMI, ADMITTED TO [facility]    Neuropathy in diabetes     Non-celiac gluten sensitivity     Orthostasis     Osteoporosis     Oxygen dependent     PAD (peripheral artery disease)     Paroxysmal atrial fibrillation 6/6/2024    PNA (pneumonia)  "06/2016    LEFT LOBE    Polyneuropathy     PTSD (post-traumatic stress disorder)     Pulmonary hypertension     Pulmonary nodule     Senile ectropion of both lower eyelids 02/2022    Sepsis 12/16/2020    D/T UTI, ADMITTED TO [facility]    Sleep apnea     STATES DOESN'T USE BIPAP OR CPAP    Spinal stenosis in cervical region     SEVERE-LIMITED ROM    Stroke 2012    \"slight stroke\"    Syncope and collapse 07/06/2019    ADMITTED TO [facility]    Urinary retention 04/05/2021    SEEN AT [facility] ER    Vision loss     Vitamin D deficiency      Past Surgical History:   Procedure Laterality Date    CARDIAC CATHETERIZATION N/A 07/15/2019    Procedure: Coronary angiography;  Surgeon: Carrie Price MD;  Location:  [facility];  Service: Cardiovascular    CARDIAC CATHETERIZATION N/A 07/15/2019    Procedure: Left Heart Cath;  Surgeon: Carrie Price MD;  Location:  [facility];  Service: Cardiovascular    CARDIAC CATHETERIZATION N/A 07/15/2019    Procedure: Left ventriculography;  Surgeon: Carrie Price MD;  Location:  [facility];  Service: Cardiovascular    CARDIAC CATHETERIZATION  07/15/2019    Procedure: Functional Flow [place];  Surgeon: Carrie Price MD;  Location:  [facility];  Service: Cardiovascular    CARDIAC CATHETERIZATION N/A 11/06/2019    Procedure: Right and Left Heart Cath;  Surgeon: Mya Smith MD;  Location:  [facility];  Service: Cardiovascular    CARDIAC CATHETERIZATION N/A 11/06/2019    Procedure: Coronary angiography;  Surgeon: Mya Smith MD;  Location:  [facility];  Service: Cardiovascular    CARDIAC SURGERY      CATARACT EXTRACTION Left 2014    CATARACT EXTRACTION Right 11/2016    PHACO/IOL, DR. LEN DENTON    COLONOSCOPY N/A 03/16/2023    ENTIRE COLON WNL, RESCOPE IN 5 YRS, DR. LINDA LIZARRAGA AT [facility]    COLONOSCOPY W/ POLYPECTOMY N/A 01/02/2015    A FEW DIVERTICULA IN SIGMOID, 6 MM TUBULOVILLOUS " ADENOMA POLYP IN RECTUM, SMALL HEMORRHOIDS, MELANOSIS COLI, DR. AVTAR JEONG AT [facility]    CORONARY ARTERY BYPASS GRAFT N/A 07/18/2019    Procedure: INTRAOPERATIVE SHOAIB; STERNOTOMY CORONARY ARTERY BYPASS x 3  USING LEFT INTERNAL MAMMARY ARTERY GRAFT UTILIZING ENDOSCOPICALLY HARVESTED RIGHT GREATER SAPHENOUS VEIN AND PRP.;  Surgeon: Bill Devi MD;  Location: [facility] OR;  Service: Cardiothoracic    CYSTOSCOPY BLADDER STONE LITHOTRIPSY N/A     DENTAL PROCEDURE Bilateral     3 surgeries inder implants    FEMUR IM NAILING/RODDING Left 12/03/2020    Procedure: LEFT HIP INTRAMEDULLARY NAIL;  Surgeon: Niraj Ravi MD;  Location: [facility] OR;  Service: Orthopedic Spine;  Laterality: Left;    INCISION AND DRAINAGE PERIRECTAL ABSCESS N/A 10/04/2023    Procedure: Incision and drainage of perianal and buttock abscess;  Surgeon: Herbie Villatoro MD;  Location: [facility] OR;  Service: General;  Laterality: N/A;    INGUINAL HERNIA REPAIR Bilateral     TOENAIL EXCISION  06/2022    TONSILLECTOMY Bilateral     TOTAL HIP ARTHROPLASTY REVISION Left 01/11/2022    Procedure: TOTAL HIP ARTHROPLASTY REVISION- POSTERIOR;  Surgeon: Jurgen Candelaria II, MD;  Location: [facility] OR;  Service: Orthopedics;  Laterality: Left;    VASECTOMY N/A       General Information       Row Name 06/10/24 1157          OT Time and Intention    Document Type therapy note (daily note) (P)   -     Mode of Treatment individual therapy;occupational therapy (P)   -       Row Name 06/10/24 1152          General Information    Patient Profile Reviewed yes (P)   -     Existing Precautions/Restrictions fall;oxygen therapy device and L/min (P)   -     Barriers to Rehab medically complex;cognitive status (P)   -       Row Name 06/10/24 1158          Cognition    Orientation Status (Cognition) oriented to;person (P)   -       Row Name 06/10/24 1158          Safety Issues, Functional Mobility    Safety Issues  Affecting Function (Mobility) ability to follow commands;insight into deficits/self-awareness;problem-solving;safety precaution awareness;safety precautions follow-through/compliance;sequencing abilities (P)   -     Impairments Affecting Function (Mobility) balance;cognition;endurance/activity tolerance;strength;coordination;motor control (P)   -MF     Cognitive Impairments, Mobility Safety/Performance attention;impulsivity;insight into deficits/self-awareness;judgment;problem-solving/reasoning;sequencing abilities (P)   -     Comment, Safety Issues/Impairments (Mobility) poor command following this date; pt in wrist retraints d/t cognitive status (P)   -               User Key  (r) = Recorded By, (t) = Taken By, (c) = Cosigned By      Initials Name Provider Type     Annita Tabares OT Student OT Student                     Mobility/ADL's       Row Name 06/10/24 1158          Bed Mobility    Bed Mobility supine-sit;sit-supine (P)   -     All Activities, [place] (Bed Mobility) maximum assist (25% patient effort);2 person assist (P)   -     Supine-Sit [place] (Bed Mobility) maximum assist (25% patient effort);2 person assist (P)   -     Sit-Supine [place] (Bed Mobility) maximum assist (25% patient effort);2 person assist (P)   -     Bed Mobility, Safety Issues cognitive deficits limit understanding;decreased use of arms for pushing/pulling;decreased use of legs for bridging/pushing;impaired trunk control for bed mobility (P)   -     Assistive Device (Bed Mobility) bed rails;draw sheet;head of bed elevated (P)   -     Comment, (Bed Mobility) resistive and not following commands (P)   -       Row Name 06/10/24 1158          Transfers    Transfers sit-stand transfer;stand-sit transfer (P)   -       Row Name 06/10/24 1158          Sit-Stand Transfer    Sit-Stand [place] (Transfers) minimum assist (75% patient effort);2 person assist (P)   -     Assistive Device  (Sit-Stand Transfers) walker, front-wheeled (P)   -     Comment, (Sit-Stand Transfer) attempted x1 STS transfer with min A x2; no sequencing or command following to take steps (P)   -       Row Name 06/10/24 1158          Stand-Sit Transfer    Stand-Sit [place] (Transfers) minimum assist (75% patient effort);2 person assist (P)   -     Assistive Device (Stand-Sit Transfers) walker, front-wheeled (P)   -       Row Name 06/10/24 1158          Functional Mobility    Functional Mobility- Ind. Level dependent (less than 25% patient effort) (P)   -     Functional Mobility- Device walker, front-wheeled (P)   -     Functional Mobility- Safety Issues sequencing ability decreased;weight-shifting ability decreased;step length decreased;supplemental O2 (P)   -     Functional Mobility- Comment unable to initiate/sequence side steps with rwx (P)   -     Patient was able to Ambulate no, other medical factors prevent ambulation (P)   -MF       Row Name 06/10/24 1158          Activities of Daily Living    BADL Assessment/Intervention lower body dressing;feeding (P)   -MF       Row Name 06/10/24 1158          Self-Feeding Assessment/Training    [place] Level (Feeding) dependent (less than 25% patient effort) (P)   -       Row Name 06/10/24 1158          Lower Body Dressing Assessment/Training    [place] Level (Lower Body Dressing) lower body dressing skills;dependent (less than 25% patient effort) (P)   -               User Key  (r) = Recorded By, (t) = Taken By, (c) = Cosigned By      Initials Name Provider Type    Annita Salinas OT Student OT Student                   Obj/Interventions       Row Name 06/10/24 1203          Motor Skills    Motor Skills coordination;functional endurance (P)   -     Coordination gross motor deficit (P)   -     Functional Endurance poor d/t cognitive status and O2 sat (P)   -MF       Row Name 06/10/24 1203          Balance    Balance Assessment sitting  static balance;sitting dynamic balance;sit to stand dynamic balance;standing static balance (P)   -     Static Sitting Balance minimal assist;1-person assist (P)   -MF     Dynamic Sitting Balance minimal assist;2-person assist (P)   -MF     Position, Sitting Balance sitting edge of bed;supported (P)   -MF     Sit to Stand Dynamic Balance minimal assist;2-person assist (P)   -MF     Static Standing Balance minimal assist;2-person assist (P)   -     Position/Device Used, Standing Balance walker, front-wheeled (P)   -MF     Balance Interventions sitting;standing;sit to stand;supported;static;dynamic;moderate challenge (P)   -     Comment, Balance sitting balance is fair with cues; pt unable to maintain balance to take side steps at EOB (P)   -               User Key  (r) = Recorded By, (t) = Taken By, (c) = Cosigned By      Initials Name Provider Type     Annita Tabares OT Student OT Student                   Goals/Plan    No documentation.                  Clinical Impression       Row Name 06/10/24 1211          Pain Assessment    Pretreatment Pain Rating 0/10 - no pain (P)   -     Posttreatment Pain Rating 0/10 - no pain (P)   -     Pre/Posttreatment Pain Comment unable to assess; pt confused and disoriented (P)   -       Row Name 06/10/24 1211          Plan of Care Review    Plan of Care Reviewed With patient;spouse (P)   -     Progress no change (P)   -     Outcome Evaluation Pt seen for OT treatment this date. Pt sleeping in bed with wrist restraints upon arrival and difficult to arouse. Pt oriented to self only, on 2L NC, and very resistive to therapy. Pt demo'd max A x2 bed mobility and min A x2 STS transfer with rwx. Pt was unable to initiate/sequence side steps at EOB and unable to follow commands. Overall, pt cognitive status is main barrier to function and presents with poor activity tolerance and impaired (I) with ADLs. Pt is currently dep for all ADL's. OT services would benefit  pt to address noted deficits. Rec d/c SNF based on current cognitive status (P)   -       Row Name 06/10/24 1211          Therapy Assessment/Plan (OT)    Rehab Potential (OT) good, to achieve stated therapy goals (P)   -     Criteria for Skilled Therapeutic Interventions Met (OT) yes;skilled treatment is necessary (P)   -     Therapy Frequency (OT) 5 times/wk (P)   -       Row Name 06/10/24 1211          Therapy Plan Review/Discharge Plan (OT)    Anticipated Discharge Disposition (OT) skilled nursing facility (P)   -       Row Name 06/10/24 1211          Vital Signs    O2 Delivery Pre Treatment supplemental O2 (P)   -     O2 Delivery Intra Treatment supplemental O2 (P)   -     O2 Delivery Post Treatment supplemental O2 (P)   -       Row Name 06/10/24 1211          Positioning and Restraints    Pre-Treatment Position in bed (P)   -     Post Treatment Position bed (P)   -     In Bed notified nsg;supine;call light within reach;exit alarm on;with family/caregiver (P)   -               User Key  (r) = Recorded By, (t) = Taken By, (c) = Cosigned By      Initials Name Provider Type    Annita Salinas OT Student OT Student                   Outcome Measures       Row Name 06/10/24 1218          How much help from another is currently needed...    Putting on and taking off regular lower body clothing? 1 (P)   -MF     Bathing (including washing, rinsing, and drying) 1 (P)   -MF     Toileting (which includes using toilet bed pan or urinal) 1 (P)   -MF     Taking care of personal grooming (such as brushing teeth) 1 (P)   -MF     Eating meals 1 (P)   -       Row Name 06/10/24 1218          Functional Assessment    Outcome Measure Options AM-PAC 6 Clicks Daily Activity (OT) (P)   -               User Key  (r) = Recorded By, (t) = Taken By, (c) = Cosigned By      Initials Name Provider Type    Annita Salinas OT Student OT Student                    Occupational Therapy Education        Title: PT OT SLP Therapies (In Progress)       Topic: Occupational Therapy (Done)       Point: ADL training (Done)       Description:   Instruct learner(s) on proper safety adaptation and remediation techniques during self care or transfers.   Instruct in proper use of assistive devices.                  Learning Progress Summary             Patient Acceptance, E,TB,D, VU by CB at 6/1/2024 2218    Acceptance, E, VU by MF at 5/31/2024 1056   Family Acceptance, E,TB,D, VU by CB at 6/1/2024 2218    Acceptance, E, VU by  at 5/31/2024 1056                         Point: Home exercise program (Done)       Description:   Instruct learner(s) on appropriate technique for monitoring, assisting and/or progressing therapeutic exercises/activities.                  Learning Progress Summary             Patient Acceptance, E,TB,D, VU by CB at 6/1/2024 2218    Acceptance, E, VU by MF at 5/31/2024 1056   Family Acceptance, E,TB,D, VU by CB at 6/1/2024 2218    Acceptance, E, VU by  at 5/31/2024 1056                         Point: Precautions (Done)       Description:   Instruct learner(s) on prescribed precautions during self-care and functional transfers.                  Learning Progress Summary             Patient Acceptance, E,TB,D, VU by CB at 6/1/2024 2218    Acceptance, E, VU by MF at 5/31/2024 1056   Family Acceptance, E,TB,D, VU by CB at 6/1/2024 2218    Acceptance, E, VU by  at 5/31/2024 1056                         Point: Body mechanics (Done)       Description:   Instruct learner(s) on proper positioning and spine alignment during self-care, functional mobility activities and/or exercises.                  Learning Progress Summary             Patient Acceptance, E,TB,D, VU by CB at 6/1/2024 2218    Acceptance, E, VU by MF at 5/31/2024 1056   Family Acceptance, E,TB,D, VU by CB at 6/1/2024 2218    Acceptance, E, VU by  at 5/31/2024 1056                                         User Key       Initials Effective  Dates Name Provider Type Discipline     02/21/24 -  Murphy Herndon, RN Registered Nurse Nurse     04/30/24 -  Annita Tabares OT Student OT Student OT                  OT Recommendation and Plan  Therapy Frequency (OT): (P) 5 times/wk  Plan of Care Review  Plan of Care Reviewed With: (P) patient, spouse  Progress: (P) no change  Outcome Evaluation: (P) Pt seen for OT treatment this date. Pt sleeping in bed with wrist restraints upon arrival and difficult to arouse. Pt oriented to self only, on 2L NC, and very resistive to therapy. Pt demo'd max A x2 bed mobility and min A x2 STS transfer with rwx. Pt was unable to initiate/sequence side steps at EOB and unable to follow commands. Overall, pt cognitive status is main barrier to function and presents with poor activity tolerance and impaired (I) with ADLs. Pt is currently dep for all ADL's. OT services would benefit pt to address noted deficits. Rec d/c SNF based on current cognitive status     Time Calculation:   Evaluation Complexity (OT)  Review Occupational Profile/Medical/Therapy History Complexity: expanded/moderate complexity  Assessment, Occupational Performance/Identification of Deficit Complexity: 3-5 performance deficits  Clinical Decision Making Complexity (OT): detailed assessment/moderate complexity  Overall Complexity of Evaluation (OT): moderate complexity     Time Calculation- OT       Row Name 06/10/24 1218             Time Calculation- OT    OT Start Time 0959 (P)   -      OT Stop Time 1017 (P)   -      OT Time Calculation (min) 18 min (P)   -      Total Timed Code Minutes- OT 18 minute(s) (P)   -      OT Received On 06/10/24 (P)   -      OT - Next Appointment 06/11/24 (P)   -         Timed Charges    64094 - OT Therapeutic Activity Minutes 18 (P)   -         Total Minutes    Timed Charges Total Minutes 18 (P)   -       Total Minutes 18 (P)   -                User Key  (r) = Recorded By, (t) = Taken By, (c) = Cosigned By       Initials Name Provider Type     Annita Tabares OT Student OT Student                  Therapy Charges for Today       Code Description Service Date Service Provider Modifiers Qty    [id number]  OT THERAPEUTIC ACT EA 15 MIN 6/10/2024 Annita Tabares OT Student GO 1    [id number]  OT THER SUPP EA 15 MIN 6/10/2024 Annita Tabares OT Student GO 1                 SANDY Cadet  6/10/2024

## 2024-06-10 NOTE — PROGRESS NOTES
PROGRESS NOTE      Patient Name: Dilip Verma  : 1950  MRN: [id number]  Primary Care Physician: Fernando Camargo DO  Date of admission: 2024    Patient Care Team:  Fernando Camargo DO as PCP - General (Family Medicine)  Morris Cai MD as Consulting Physician (Sleep Medicine)  Michaela Hylton MD as Consulting Physician (Cardiology)  Hardy Banerjee MD as Consulting Physician (Ophthalmology)  Chula Christianson MD as Consulting Physician (Ophthalmology)  Jason Sherman MD (Endocrinology)  Perla Mayen MD as Consulting Physician (Nephrology)  Federico Hebert MD as Consulting Physician (Pulmonary Disease)  Niraj Ravi MD as Consulting Physician (Orthopedic Surgery)  Papa Velasco MD as Consulting Physician (Urology)  Terese Yost MD as Consulting Physician (Gastroenterology)  Aracelis Ball MD as Consulting Physician (Colon and Rectal Surgery)  [facility] as Primary Care Provider        Reason for Follow up:     AMENA, CKD III      Subjective:     Feeling better   No distress         Review of Systems:         Constitutional: weakness.  HEENT:  No headache, otalgia, itchy eyes, nasal discharge or sore throat.  Cardiac:  No chest pain, dyspnea, orthopnea or PND.  Chest:  No cough, phlegm or wheezing.  Abdomen:  No abdominal pain, nausea or vomiting.  Neuro:  No focal weakness, abnormal movements or seizure-like activity.  :   No hematuria, no pyuria, no dysuria, no flank pain.  Extremities:  No  joint pains.  ROS was otherwise negative except as mentioned in the [facility].    Social History:  reports that he quit smoking about 24 years ago. His smoking use included cigarettes. He started smoking about 40 years ago. He has a 12 pack-year smoking history. He has been exposed to tobacco smoke. He has never used smokeless tobacco. He reports that he does not currently use alcohol. He reports that he does not use drugs.    Medications:  Prior to  Admission medications    Medication Sig Start Date End Date Taking? Authorizing Provider   aspirin 81 MG EC tablet Take 1 tablet by mouth Every Night.   Yes Heri Rinaldi MD   brimonidine (ALPHAGAN) 0.2 % ophthalmic solution Administer 1 drop to both eyes 2 (Two) Times a Day.   Yes Heri Rinaldi MD   bumetanide (BUMEX) 1 MG tablet Take 1 tablet by mouth Daily.   Yes Heri Rinaldi MD   Cholecalciferol (Vitamin D-3) 125 MCG (5000 UT) tablet Take 1 tablet by mouth Daily.   Yes Heri Rinaldi MD   DULoxetine (CYMBALTA) 30 MG capsule Take 1 capsule by mouth Every Night. 1/16/24  Yes Heri Rinaldi MD   Insulin Glargine, 2 Unit Dial, (TOUJEO) 300 UNIT/ML solution pen-injector injection Inject 24 Units under the skin into the appropriate area as directed Daily.   Yes Heri Rinaldi MD   insulin lispro (humaLOG) 100 UNIT/ML injection Inject 0-14 Units under the skin into the appropriate area as directed 3 (Three) Times a Day Before Meals.  Patient taking differently: Inject 0-14 Units under the skin into the appropriate area as directed 3 (Three) Times a Day Before Meals. SLIDING SCALE  100-150 - 4 units  151-200 - 5 units  201-250 - 6 units  251-300 - 7 units  301-350 - 8 units  351-400 - 9 units  401+ - 10 units 12/7/20  Yes Amador Valverde MD   latanoprost (XALATAN) 0.005 % ophthalmic solution Administer 1 drop to both eyes every night at bedtime. 1/16/23  Yes Heri Rinaldi MD   multivitamin with minerals (MULTIVITAMIN ADULTS PO) Take 1 tablet by mouth Daily.   Yes Heri Rinaldi MD   testosterone (ANDROGEL) 25 MG/2.5GM (1%) gel gel Place 25 mg on the skin as directed by provider 2 (Two) Times a Week.   Yes Heri Rinaldi MD   traMADol (ULTRAM) 50 MG tablet Take 1 tablet by mouth At Night As Needed. 10/16/23  Yes Heri Rinaldi MD   Magnesium 400 MG capsule Take 400 mg by mouth As Needed.    Heri Rinaldi MD   sildenafil (REVATIO) 20 MG  tablet Take 1 tablet by mouth As Needed.    Provider, MD Heri     Scheduled Meds:amiodarone, 200 mg, Oral, Q24H  [Held by provider] apixaban, 5 mg, Oral, Q12H  aspirin, 81 mg, Oral, Daily  brimonidine, 1 drop, Both Eyes, BID  DULoxetine, 30 mg, Oral, Daily  enoxaparin, 1 mg/kg, Subcutaneous, BID  hydrALAZINE, 25 mg, Oral, Q8H  insulin lispro, 2-9 Units, Subcutaneous, 4x Daily AC & at Bedtime  latanoprost, 1 drop, Both Eyes, Nightly  metoprolol tartrate, 12.5 mg, Oral, Q12H  OLANZapine, 5 mg, Oral, Nightly  senna-docusate sodium, 2 tablet, Oral, BID  sodium chloride, 10 mL, Intravenous, Q12H  terazosin, 1 mg, Oral, Nightly  thiamine, 100 mg, Intravenous, Daily  traMADol, 50 mg, Oral, Once      Continuous Infusions:dextrose, 75 mL/hr, Last Rate: 75 mL/hr (06/10/24 0649)        PRN Meds:  acetaminophen **OR** acetaminophen **OR** acetaminophen    senna-docusate sodium **AND** polyethylene glycol **AND** bisacodyl **AND** bisacodyl    dextrose    dextrose    glucagon (human recombinant)    nitroglycerin    OLANZapine    ondansetron    sodium chloride    sodium chloride    traMADol  Allergies:    Allergies   Allergen Reactions    Ace Inhibitors Angioedema    Angiotensin Receptor Blockers Angioedema and Unknown (See Comments)     Angioneurotic edema    Dapagliflozin Other (See Comments)     UTI,  rectal abcess    Losartan Angioedema     Angioneurotic edema    Seroquel [Quetiapine] Hallucinations    Xanax [Alprazolam] Unknown - High Severity    Amlodipine Swelling    Ativan [Lorazepam] Hallucinations    Baclofen Hallucinations    Misc. Sulfonamide Containing Compounds Unknown (See Comments)    Minoxidil Confusion    Tetracycline Rash     bliisters in mouth         Objective   Exam:     Vital Signs  Temp:  [98.1 °F (36.7 °C)-98.8 °F (37.1 °C)] 98.1 °F (36.7 °C)  Heart Rate:  [78-96] 81  Resp:  [18] 18  BP: (106-141)/(53-71) 130/66  SpO2:  [88 %-94 %] 94 %  on  Flow (L/min):  [2] 2;   Device (Oxygen Therapy):  "humidified;nasal cannula  Body mass index is 31.02 kg/m².       Exam:     /66 (BP Location: Left arm, Patient Position: Lying)   Pulse 81   Temp 98.1 °F (36.7 °C) (Oral)   Resp 18   Ht 172.7 cm (68\")   Wt 92.5 kg (204 lb)   SpO2 94%   BMI 31.02 kg/m²     General Appearance:    Appears chronically ill, no distress   Head:    Normocephalic, atraumatic   Eyes:     EOM's intact, sclerae anicteric        Ears:    TMs not observed   Nose:   Patent without discharge   Neck:   Supple, JVD   Lungs:     Clear to auscultation bilaterally, respiratory effort is normal   Chest wall:    No tenderness   Heart:    Regular rate and rhythm, S1 and S2 normal, no   rub    or gallop   Abdomen:     Soft, nontender, nondistended,  no masses, no organomegaly   Extremities:   No edema   Neurologic:  No focal deficits.  Speech is fluent.  Conversation is coherent.      Results Review:  I have personally reviewed most recent Data :  [facility]  CBC    Results from last 7 days   Lab Units 06/06/24  0620   WBC 10*3/mm3 10.27   HEMOGLOBIN g/dL 12.9*   PLATELETS 10*3/mm3 261     CMP   Results from last 7 days   Lab Units 06/10/24  0701 06/09/24  0612 06/08/24  0718 06/07/24  0651 06/06/24  1715 06/06/24  0826 06/06/24  0620   SODIUM mmol/L 136 142 146* 140 142 141 140   POTASSIUM mmol/L 3.3* 3.1* 3.5 3.8 3.9 4.0 4.1   CHLORIDE mmol/L 102 105 110* 107 106 106 106   CO2 mmol/L 23.1 25.0 23.4 21.3* 20.3* 22.7 21.2*   BUN mg/dL 23 24* 24* 28* 29* 28* 27*   CREATININE mg/dL 2.34* 1.63* 1.54* 1.48* 1.61* 1.83* 1.78*   GLUCOSE mg/dL 155* 125* 76 119* 78 76 79     ABG          No radiology results for the last 7 days    Results for orders placed during the hospital encounter of 05/30/24    Adult Transthoracic Echo Complete W/ Cont if Necessary Per Protocol    Interpretation Summary    Left ventricular systolic function is hyperdynamic (EF > 70%). Calculated left ventricular EF = 75% Global longitudinal LV strain (GLS) = " -17.8%. Left ventricle strain data was reviewed by the physician and found to be accurate. Global longitudinal LV strain is normal however there is regional abnormality with apical sparing suggestive of amyloid heart disease. However there is also basal ptal hypertrophy suggestive of hypertrophic cardiomyopathy. Wall motion abnormality is also noted in the basal septum and cannot rule out ischemic component.Consider additional imaging such as cardiac MRI and further evaluation also for amyloid heart disease as clinically indicated. The left ventricular cavity is small in size. Left ventricular wall thickness is consistent with moderate to severe septal asymmetric hypertrophy. There is hypokinesis of the left ventricular basal septum. Left ventricular diastolic function is consistent with (grade II w/high LAP) pseudonormalization.    The left atrial cavity is mildly dilated.    No aortic valve regurgitation or stenosis is present. The aortic valve is abnormal in structure. There is mild thickening of the aortic valve.    There is mild, bileaflet mitral valve thickening present. Trace mitral valve regurgitation is present. No significant mitral valve stenosis is present.    Moderate tricuspid valve regurgitation is present. Estimated right ventricular systolic pressure from tricuspid regurgitation is markedly elevated (>55 mmHg). Calculated right ventricular systolic pressure from tricuspid regurgitation is 74 mmHg.        Assessment & Plan   Assessment and Plan:         Acute UTI (urinary tract infection)    Type 2 diabetes mellitus with hyperglycemia, with long-term current use of insulin    CAD (coronary artery disease)    Hx of CABG    Chronic diastolic heart failure    CSA (central sleep apnea)    HTN (hypertension)    History of stroke    CKD (chronic kidney disease) stage 3, GFR 30-59 ml/min    Peripheral arterial disease    Sepsis    Hyperkalemia    Metabolic encephalopathy    AMENA (acute kidney injury)     Paroxysmal atrial fibrillation    ASSESSMENT:  AMENA likely prerenal ATN 2/2 urosepsis with hemodynamic changes; on CKD III etiology likely diabetic and hypertensive nephropathy with baseline sCr ~0.9-1.2 mg/dL  Hyperkalemia, now resolved  UTI, urine cultures with gram neg bacilli  Leukocytosis  Metabolic encephalopathy  PAD  Hx stroke  HTN  CHF  CAD  DM2  Chronic splenic vein thrombosis  Possible hepatocellular disease/cirrhosis  Likely benign subpleural nodule with bilateral lymphadenopathy  Hiatal hernia    Last TTE 10/5/22 with EF 66%, grade II DD    PLAN :     AMENA likely prerenal ATN 2/2 urosepsis with hemodynamic changes; on CKD III etiology likely diabetic and hypertensive nephropathy with baseline sCr ~0.9-1.2 mg/dL  Creatinine again today 2.34    Off of Bumex 0.5 mg daily, will continue to hold  Will start D5W 75 ml per hour  Volume with CXR with bibasilar consolidation,   Electrolytes, acid/base acceptable  Followed by infectious disease, evaluation noted, appreciate assistance.  BP trends acceptable  Cardiology following for new onset A-fib, evaluation noted  Avoid NSAIDs, nephrotoxic agents  We will follow and coordinate with team  Overall stable from renal standpoint  More confused today        Cristóbal Mayen MD  [facility]  6/10/2024  11:15 EDT

## 2024-06-10 NOTE — THERAPY TREATMENT NOTE
Patient Name: Dilip Verma  : 1950    MRN: [id number]                              Today's Date: 6/10/2024       Admit Date: 2024    Visit Dx:     ICD-10-CM ICD-9-CM   1. Acute respiratory failure with hypoxia  J96.01 518.81   2. Sepsis without acute organ dysfunction, due to unspecified organism  A41.9 038.9     995.91   3. Hyperkalemia  E87.5 276.7   4. Metabolic encephalopathy  G93.41 348.31   5. Acute UTI  N39.0 599.0   6. Hyperglycemia due to diabetes mellitus  E11.65 250.02   7. Chronic heart failure with preserved ejection fraction (HFpEF)  I50.32 428.9   8. Acute UTI (urinary tract infection)  N39.0 599.0     Patient Active Problem List   Diagnosis    Anxiety    Colon polyp    Type 2 diabetes mellitus with hyperglycemia, with long-term current use of insulin    Erectile dysfunction    Hyperlipidemia    Type 2 acute myocardial infarction    CAD (coronary artery disease)    Hx of CABG    Chronic diastolic heart failure    Hypersomnia due to medical condition    CSA (central sleep apnea)    Periodic breathing    HTN (hypertension)    History of stroke    CKD (chronic kidney disease) stage 3, GFR 30-59 ml/min    Urinary retention    Acute on chronic renal failure    Acute UTI (urinary tract infection)    Acute on chronic diastolic (congestive) heart failure    Bacteriuria    Rectal pain    Status post total replacement of hip    Vitamin D deficiency    Post-acute COVID-19 syndrome    Insulin dose changed    Arthropathy associated with neurological disorder    Personal history of colonic polyps    Type 2 diabetes mellitus with diabetic neuropathy, with long-term current use of insulin    Cervical spinal stenosis    Abscess of skin    Overweight    Cervical stenosis of spine    Spinal stenosis, lumbar region, without neurogenic claudication    Medically noncompliant    Chronic heart failure with preserved ejection fraction (HFpEF)    Carotid stenosis    Peripheral arterial disease    Sepsis     Hyperkalemia    Metabolic encephalopathy    AMENA (acute kidney injury)    Paroxysmal atrial fibrillation     Past Medical History:   Diagnosis Date    AMENA (acute kidney injury) 12/03/2020    Alcoholism 1989    not since 1998    CORI positive     Anemia     Anxiety     Ataxia     Atypical chest pain 04/19/2022    SEEN AT [facility] ER    Balance disorder     Carotid stenosis 3/28/2024    Cataract     BILATERAL, S/P EXTRACTION    Cervical radiculopathy 11/11/2019    SEEN AT  [facility] ER    Cervical spinal cord compression     Chronic diastolic (congestive) heart failure     Chronic kidney disease     STAGE 3, FOLLOWED BY DR. VIVAR    Chronic pancreatitis     Closed left subtrochanteric femur fracture 12/01/2020    ADMITTED TO [facility]    Closed nondisplaced intertrochanteric fracture of left femur 12/01/2020    ADMITTED TO [facility]    Colon polyps     FOLLOWED BY DR. AVTAR JEONG    Constipation     Contracture, right hand     Coronary artery disease     CABG 7/2019    COVID-19 07/2022    DDD (degenerative disc disease), cervical     Diabetes mellitus, type II     IDDM    Diabetic retinopathy     Difficulty walking     Dysphagia     Elevated brain natriuretic peptide (BNP) level 08/2014    Elevated LFTs 03/2021    Erectile dysfunction     Fissure, anal 2022    Foot drop     Fuchs' corneal dystrophy of right eye     Glaucoma     BILATERAL    History of alcohol abuse     Hyperlipidemia     Hypersomnia     Hypertension     Hypertensive urgency 02/25/2020    ADMITTED TO [facility]    Insomnia     Kidney stones     LV dysfunction 06/2016    Lyme disease     Lymphadenopathy syndrome 05/2021    Macular edema     BILATERAL    Myocardial infarction 11/05/2019    NSTEMI, ADMITTED TO [facility]    Myocardial infarction 07/12/2019    NSTEMI, ADMITTED TO [facility]    Neuropathy in diabetes     Non-celiac gluten sensitivity     Orthostasis     Osteoporosis     Oxygen dependent     PAD (peripheral artery disease)     Paroxysmal atrial fibrillation 6/6/2024    PNA (pneumonia)  "06/2016    LEFT LOBE    Polyneuropathy     PTSD (post-traumatic stress disorder)     Pulmonary hypertension     Pulmonary nodule     Senile ectropion of both lower eyelids 02/2022    Sepsis 12/16/2020    D/T UTI, ADMITTED TO [facility]    Sleep apnea     STATES DOESN'T USE BIPAP OR CPAP    Spinal stenosis in cervical region     SEVERE-LIMITED ROM    Stroke 2012    \"slight stroke\"    Syncope and collapse 07/06/2019    ADMITTED TO [facility]    Urinary retention 04/05/2021    SEEN AT [facility] ER    Vision loss     Vitamin D deficiency      Past Surgical History:   Procedure Laterality Date    CARDIAC CATHETERIZATION N/A 07/15/2019    Procedure: Coronary angiography;  Surgeon: Carrie Price MD;  Location:  [facility];  Service: Cardiovascular    CARDIAC CATHETERIZATION N/A 07/15/2019    Procedure: Left Heart Cath;  Surgeon: Carrie Price MD;  Location:  [facility];  Service: Cardiovascular    CARDIAC CATHETERIZATION N/A 07/15/2019    Procedure: Left ventriculography;  Surgeon: Carrie Price MD;  Location:  [facility];  Service: Cardiovascular    CARDIAC CATHETERIZATION  07/15/2019    Procedure: Functional Flow [place];  Surgeon: Carrie Price MD;  Location:  [facility];  Service: Cardiovascular    CARDIAC CATHETERIZATION N/A 11/06/2019    Procedure: Right and Left Heart Cath;  Surgeon: Mya Smith MD;  Location:  [facility];  Service: Cardiovascular    CARDIAC CATHETERIZATION N/A 11/06/2019    Procedure: Coronary angiography;  Surgeon: Mya Smith MD;  Location:  [facility];  Service: Cardiovascular    CARDIAC SURGERY      CATARACT EXTRACTION Left 2014    CATARACT EXTRACTION Right 11/2016    PHACO/IOL, DR. LEN DENTON    COLONOSCOPY N/A 03/16/2023    ENTIRE COLON WNL, RESCOPE IN 5 YRS, DR. LINDA LIZARRAGA AT [facility]    COLONOSCOPY W/ POLYPECTOMY N/A 01/02/2015    A FEW DIVERTICULA IN SIGMOID, 6 MM TUBULOVILLOUS " ADENOMA POLYP IN RECTUM, SMALL HEMORRHOIDS, MELANOSIS COLI, DR. AVTAR JEONG AT [facility]    CORONARY ARTERY BYPASS GRAFT N/A 2019    Procedure: INTRAOPERATIVE SHOAIB; STERNOTOMY CORONARY ARTERY BYPASS x 3  USING LEFT INTERNAL MAMMARY ARTERY GRAFT UTILIZING ENDOSCOPICALLY HARVESTED RIGHT GREATER SAPHENOUS VEIN AND PRP.;  Surgeon: Bill Devi MD;  Location: [facility] OR;  Service: Cardiothoracic    CYSTOSCOPY BLADDER STONE LITHOTRIPSY N/A     DENTAL PROCEDURE Bilateral     3 surgeries inder implants    FEMUR IM NAILING/RODDING Left 2020    Procedure: LEFT HIP INTRAMEDULLARY NAIL;  Surgeon: Niraj Ravi MD;  Location: [facility] OR;  Service: Orthopedic Spine;  Laterality: Left;    INCISION AND DRAINAGE PERIRECTAL ABSCESS N/A 10/04/2023    Procedure: Incision and drainage of perianal and buttock abscess;  Surgeon: Herbie Villatoro MD;  Location: [facility] OR;  Service: General;  Laterality: N/A;    INGUINAL HERNIA REPAIR Bilateral     TOENAIL EXCISION  2022    TONSILLECTOMY Bilateral     TOTAL HIP ARTHROPLASTY REVISION Left 2022    Procedure: TOTAL HIP ARTHROPLASTY REVISION- POSTERIOR;  Surgeon: Jurgen Candelaria II, MD;  Location: [facility] OR;  Service: Orthopedics;  Laterality: Left;    VASECTOMY N/A       General Information       Row Name 06/10/24 1529          Physical Therapy Time and Intention    Document Type therapy note (daily note)  -DJ     Mode of Treatment individual therapy;physical therapy  -DJ       Row Name 06/10/24 1529          General Information    Patient Profile Reviewed yes  -DJ     Existing Precautions/Restrictions fall;oxygen therapy device and L/min  -DJ       Row Name 06/10/24 1529          Cognition    Orientation Status (Cognition) oriented to;person;other (see comments)  oriented to , VERY slow to respond to ?s or commands  -DJ       Row Name 06/10/24 1529          Safety Issues, Functional Mobility    Comment, Safety  Issues/Impairments (Mobility) nonskid socks, B wrist restraints  -DJ               User Key  (r) = Recorded By, (t) = Taken By, (c) = Cosigned By      Initials Name Provider Type    Jacqueline Barrera PT Physical Therapist                   Mobility       Row Name 06/10/24 1531          Bed Mobility    Bed Mobility supine-sit;sit-supine  -DJ     Supine-Sit [place] (Bed Mobility) 2 person assist;verbal cues;maximum assist (25% patient effort)  -DJ     Sit-Supine [place] (Bed Mobility) dependent (less than 25% patient effort);2 person assist;verbal cues  -DJ     Assistive Device (Bed Mobility) bed rails;draw sheet;head of bed elevated  -DJ     Comment, (Bed Mobility) unable to follow commands for sequencing  -DJ       Row Name 06/10/24 1531          Transfers    Comment, (Transfers) unable to stand due to inability to follow commands  -DJ       Row Name 06/10/24 1531          Bed-Chair Transfer    Bed-Chair [place] (Transfers) unable to assess  -DJ       Row Name 06/10/24 1531          Sit-Stand Transfer    Sit-Stand [place] (Transfers) unable to assess  -DJ       Row Name 06/10/24 1531          Gait/Stairs (Locomotion)    [place] Level (Gait) unable to assess  -DJ               User Key  (r) = Recorded By, (t) = Taken By, (c) = Cosigned By      Initials Name Provider Type    Jacqueline Barrera PT Physical Therapist                   Obj/Interventions       Row Name 06/10/24 1532          Motor Skills    Motor Skills functional endurance  -DJ     Therapeutic Exercise other (see comments)  pt dem ataxic LE movements when attempting to perform LE ther ex  -DJ       Row Name 06/10/24 1532          Balance    Balance Assessment sitting static balance;sitting dynamic balance  -DJ     Static Sitting Balance minimal assist;verbal cues  -DJ     Dynamic Sitting Balance minimal assist;2-person assist;verbal cues  -DJ     Position, Sitting Balance supported;sitting edge of bed  -DJ     Balance Interventions  sitting;supported  -DJ     Comment, Balance fair sitting balance once positioned  -DJ               User Key  (r) = Recorded By, (t) = Taken By, (c) = Cosigned By      Initials Name Provider Type    Jacqueline Barrera, PT Physical Therapist                   Goals/Plan    No documentation.                  Clinical Impression       Row Name 06/10/24 153          Pain    Pretreatment Pain Rating 0/10 - no pain  -DJ       Row Name 06/10/24 153          Plan of Care Review    Plan of Care Reviewed With patient;spouse  -DJ     Progress no change  -DJ     Outcome Evaluation Pt in bed, wife and pt sharing lunch tray, pt pocketing food in his mouth. Despite mult requests to swallow chicken, pt unable to swallow and eventually req PT staff to remove pocketed food. Pt oriented to namd and  but is VERY slow to respond to commands. He req max A of 2 to sit EOB, He dem fair sitting balance but was unable to stand from EOB due to difficulty following commands and constant destractions. Pt dem ataxic movements B LE when attempting to perform seated LE ther ex. Pt was dependent to return supine and reposition in bed. Pt is certainly not safe to return home - wife appears to be in denial. Recommend SNF upon d/c  -DJ       Row Name 06/10/24 1536          Therapy Assessment/Plan (PT)    Criteria for Skilled Interventions Met (PT) skilled treatment is necessary  -DJ     Therapy Frequency (PT) 3 times/wk  -DJ       Row Name 06/10/24 1532          Vital Signs    O2 Delivery Pre Treatment nasal cannula  -DJ     O2 Delivery Intra Treatment nasal cannula  -DJ     O2 Delivery Post Treatment nasal cannula  -DJ     Pre Patient Position Supine  -DJ     Intra Patient Position Sitting  -DJ     Post Patient Position Supine  -DJ       Row Name 06/10/24 1532          Positioning and Restraints    Pre-Treatment Position in bed  -DJ     Post Treatment Position bed  -DJ     In Bed notified nsg;supine;call light within reach;encouraged to call for  assist;exit alarm on;with family/caregiver;heels elevated  -DJ     Restraints reapplied:;soft limb  -DJ               User Key  (r) = Recorded By, (t) = Taken By, (c) = Cosigned By      Initials Name Provider Type    Jacqeuline Barrera, PT Physical Therapist                   Outcome Measures       Row Name 06/10/24 1540          How much help from another person do you currently need...    Turning from your back to your side while in flat bed without using bedrails? 2  -DJ     Moving from lying on back to sitting on the side of a flat bed without bedrails? 2  -DJ     Moving to and from a bed to a chair (including a wheelchair)? 1  -DJ     Standing up from a chair using your arms (e.g., wheelchair, bedside chair)? 1  -DJ     Climbing 3-5 steps with a railing? 1  -DJ     To walk in hospital room? 1  -DJ     AM-PAC 6 Clicks Score (PT) 8  -DJ     Highest Level of Mobility Goal 3 --> Sit at edge of bed  -DJ       Row Name 06/10/24 1540 06/10/24 1218       Functional Assessment    Outcome Measure Options AM-PAC 6 Clicks Basic Mobility (PT)  -DJ AM-PAC 6 Clicks Daily Activity (OT)  -MM (r) MF (t) MM (c)              User Key  (r) = Recorded By, (t) = Taken By, (c) = Cosigned By      Initials Name Provider Type    Jacqueline Barrera, SHEFALI Physical Therapist    Annita Hi, OT Occupational Therapist    Annita Salinas, OT Student OT Student                                 Physical Therapy Education       Title: PT OT SLP Therapies (In Progress)       Topic: Physical Therapy (In Progress)       Point: Mobility training (In Progress)       Learning Progress Summary             Patient Acceptance, E, NR by EM at 6/7/2024 1511    Acceptance, E,D, DU by PC at 6/5/2024 1518    Acceptance, E,D, DU,NR by PC at 6/4/2024 1557    Acceptance, E,D, DU by PC at 6/2/2024 1427    Acceptance, E,TB,D, VU by CB at 6/1/2024 2218    Acceptance, E,D, DU by PC at 5/31/2024 1612   Family Acceptance, E,TB,D, VU by CB at 6/1/2024 2218    Significant Other Acceptance, E, NR by EM at 6/7/2024 1511                         Point: Home exercise program (In Progress)       Learning Progress Summary             Patient Nonacceptance, E, NL,NR by DJ at 6/10/2024 1541    Acceptance, E,D, DU by PC at 6/5/2024 1518    Acceptance, E,D, DU,NR by PC at 6/4/2024 1557    Acceptance, E,D, DU by PC at 6/2/2024 1427    Acceptance, E,TB,D, VU by CB at 6/1/2024 2218    Acceptance, E,D, DU by PC at 5/31/2024 1612   Family Nonacceptance, E, NL,NR by DJ at 6/10/2024 1541    Acceptance, E,TB,D, VU by CB at 6/1/2024 2218                         Point: Body mechanics (In Progress)       Learning Progress Summary             Patient Nonacceptance, E, NL,NR by DJ at 6/10/2024 1541    Acceptance, E,D, DU by PC at 6/5/2024 1518    Acceptance, E,D, DU,NR by PC at 6/4/2024 1557    Acceptance, E,D, DU by PC at 6/2/2024 1427    Acceptance, E,TB,D, VU by CB at 6/1/2024 2218    Acceptance, E,D, DU by PC at 5/31/2024 1612   Family Nonacceptance, E, NL,NR by DJ at 6/10/2024 1541    Acceptance, E,TB,D, VU by CB at 6/1/2024 2218                         Point: Precautions (In Progress)       Learning Progress Summary             Patient Nonacceptance, E, NL,NR by DJ at 6/10/2024 1541    Acceptance, E,D, DU by PC at 6/5/2024 1518    Acceptance, E,D, DU,NR by PC at 6/4/2024 1557    Acceptance, E,D, DU by PC at 6/2/2024 1427    Acceptance, E,TB,D, VU by CB at 6/1/2024 2218    Acceptance, E,D, DU by PC at 5/31/2024 1612   Family Nonacceptance, E, NL,NR by DJ at 6/10/2024 1541    Acceptance, E,TB,D, VU by CB at 6/1/2024 4852                                         User Key       Initials Effective Dates Name Provider Type Discipline    PC 06/16/21 -  Gemma Hamilton, PT Physical Therapist PT    EM 06/16/21 -  Nila Toro, PT Physical Therapist PT    DJ 10/25/19 -  Jacqueline Lizarraga, PT Physical Therapist PT    CB 02/21/24 -  Murphy Herndon, RN Registered Nurse Nurse                  PT  Recommendation and Plan     Plan of Care Reviewed With: patient, spouse  Progress: no change  Outcome Evaluation: Pt in bed, wife and pt sharing lunch tray, pt pocketing food in his mouth. Despite mult requests to swallow chicken, pt unable to swallow and eventually req PT staff to remove pocketed food. Pt oriented to namd and  but is VERY slow to respond to commands. He req max A of 2 to sit EOB, He dem fair sitting balance but was unable to stand from EOB due to difficulty following commands and constant destractions. Pt dem ataxic movements B LE when attempting to perform seated LE ther ex. Pt was dependent to return supine and reposition in bed. Pt is certainly not safe to return home - wife appears to be in denial. Recommend SNF upon d/c     Time Calculation:         PT Charges       Row Name 06/10/24 1542             Time Calculation    Start Time 1415  -DJ      Stop Time 1442  -DJ      Time Calculation (min) 27 min  -DJ      PT Non-Billable Time (min) 10 min  -DJ      PT Received On 06/10/24  -DJ      PT - Next Appointment 24  -DJ                User Key  (r) = Recorded By, (t) = Taken By, (c) = Cosigned By      Initials Name Provider Type    Jacqueline Barrera PT Physical Therapist                  Therapy Charges for Today       Code Description Service Date Service Provider Modifiers Qty    00131350793 HC PT THERAPEUTIC ACT EA 15 MIN 6/10/2024 Jacqueline Lizarraga, PT GP 2    55608798371 HC PT THER SUPP EA 15 MIN 6/10/2024 Jacqueline Lizarraga PT GP 2            PT G-Codes  Outcome Measure Options: AM-PAC 6 Clicks Basic Mobility (PT)  AM-PAC 6 Clicks Score (PT): 8  AM-PAC 6 Clicks Score (OT): 15  PT Discharge Summary  Anticipated Discharge Disposition (PT): skilled nursing facility    Jacqueline Lizarraga PT  6/10/2024

## 2024-06-10 NOTE — PROGRESS NOTES
"  Infectious Diseases Progress Note    Tino Nagel MD     [facility]  [place]: 11 days  Patient Identification:  Name: Dilip Verma  Age: 74 y.o.  Sex: male  :  1950  MRN: [id number]         Primary Care Physician: Fernando Camargo, DO        Subjective: Was restless earlier in the morning and required tramadol.  Currently resting well.  Interval History: See consultation note.  2024 MRSA screen is negative.  Objective:    Scheduled Meds:amiodarone, 200 mg, Oral, Q24H  [Held by provider] apixaban, 5 mg, Oral, Q12H  aspirin, 81 mg, Oral, Daily  brimonidine, 1 drop, Both Eyes, BID  bumetanide, 0.5 mg, Intravenous, Daily  DULoxetine, 30 mg, Oral, Daily  enoxaparin, 1 mg/kg, Subcutaneous, BID  hydrALAZINE, 25 mg, Oral, Q8H  insulin lispro, 2-9 Units, Subcutaneous, 4x Daily AC & at Bedtime  latanoprost, 1 drop, Both Eyes, Nightly  metoprolol tartrate, 12.5 mg, Oral, Q12H  OLANZapine, 5 mg, Oral, Nightly  senna-docusate sodium, 2 tablet, Oral, BID  sodium chloride, 10 mL, Intravenous, Q12H  terazosin, 1 mg, Oral, Nightly  thiamine, 100 mg, Intravenous, Daily  traMADol, 50 mg, Oral, Once      Continuous Infusions:dextrose, 75 mL/hr, Last Rate: 75 mL/hr (06/10/24 0459)          Vital signs in last 24 hours:  Temp:  [98.1 °F (36.7 °C)-98.8 °F (37.1 °C)] 98.1 °F (36.7 °C)  Heart Rate:  [74-96] 81  Resp:  [18] 18  BP: (106-141)/(53-71) 130/66    Intake/Output:    Intake/Output Summary (Last 24 hours) at 6/10/2024 0819  Last data filed at 6/10/2024 0425  Gross per 24 hour   Intake 660 ml   Output 700 ml   Net -40 ml         Exam:  /66 (BP Location: Left arm, Patient Position: Lying)   Pulse 81   Temp 98.1 °F (36.7 °C) (Oral)   Resp 18   Ht 172.7 cm (68\")   Wt 92.5 kg (204 lb)   SpO2 94%   BMI 31.02 kg/m²   Patient is examined using the personal protective equipment as per guidelines from infection control for this particular patient as enacted.  Hand washing was performed before and after " patient interaction.  General Appearance: Confused                          Head:    Normocephalic, without obvious abnormality, atraumatic                           Eyes:    PERRL, conjunctivae/corneas clear, EOM's intact, both eyes                         Throat:   Lips, tongue, gums normal; oral mucosa pink and moist                           Neck:   Supple, symmetrical, trachea midline, no JVD                         Lungs:    Clear to auscultation bilaterally, respirations unlabored                 Chest Wall:    No tenderness or deformity                          Heart:  S1-S2 regular                  Abdomen:   Soft nontender                 Extremities: Left lower extremity wrapping removed and cellulitis is resolved.  Superficial laceration is also healed.  Residual edema and effect of compression stocking with unequal pressure leading indentation of the skin.                        Pulses:   Pulses palpable in all extremities                  Neurologic: No acute distress       Data Review:    I reviewed the patient's new clinical results.  Results from last 7 days   Lab Units 06/06/24  0620   WBC 10*3/mm3 10.27   HEMOGLOBIN g/dL 12.9*   PLATELETS 10*3/mm3 261     Results from last 7 days   Lab Units 06/10/24  0701 06/09/24  0612 06/08/24  0718 06/07/24  0651 06/06/24  1715 06/06/24  0826 06/06/24  0620   SODIUM mmol/L 136 142 146* 140 142 141 140   POTASSIUM mmol/L 3.3* 3.1* 3.5 3.8 3.9 4.0 4.1   CHLORIDE mmol/L 102 105 110* 107 106 106 106   CO2 mmol/L 23.1 25.0 23.4 21.3* 20.3* 22.7 21.2*   BUN mg/dL 23 24* 24* 28* 29* 28* 27*   CREATININE mg/dL 2.34* 1.63* 1.54* 1.48* 1.61* 1.83* 1.78*   CALCIUM mg/dL 8.0* 8.2* 8.8 8.5* 8.6 8.7 8.6   GLUCOSE mg/dL 155* 125* 76 119* 78 76 79     Microbiology Results (last 10 days)       Procedure Component Value - Date/Time    MRSA Screen, PCR (Inpatient) - Swab, Nares [[id number]]  (Normal) Collected: 06/04/24 0952    Lab Status: Final result Specimen: Swab from Nares  Updated: 06/04/24 1112     MRSA PCR No MRSA Detected    Narrative:      The negative predictive value of this diagnostic test is high and should only be used to consider de-escalating anti-MRSA therapy. A positive result may indicate colonization with MRSA and must be correlated clinically.    Eosinophil Smear - Urine, Urine, Clean Catch [[id number]]  (Normal) Collected: 05/31/24 1614    Lab Status: Final result Specimen: Urine, Clean Catch Updated: 05/31/24 1745     Eosinophil Smear 0 % EOS/100 Cells             Assessment:    Acute UTI (urinary tract infection)    Type 2 diabetes mellitus with hyperglycemia, with long-term current use of insulin    CAD (coronary artery disease)    Hx of CABG    Chronic diastolic heart failure    CSA (central sleep apnea)    HTN (hypertension)    History of stroke    CKD (chronic kidney disease) stage 3, GFR 30-59 ml/min    Peripheral arterial disease    Sepsis    Hyperkalemia    Metabolic encephalopathy    AMENA (acute kidney injury)    Paroxysmal atrial fibrillation  1-metabolic encephalopathy due to  2-systemic infection as a result of urinary tract infection as well as evolving sepsis traumatic cellulitis of the left leg.  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     Recommendations/Discussions:  Continue to observe off of antibiotic therapy  Local care and leg elevation for lymphedema and nursing care.  Make sure that patient gets graded pressure wrapping rather than an equal pressure on the wrapping which can cause localized edema and risk for infection.  Tino Nagel MD  6/10/2024  08:19 EDT    Parts of this note may be an electronic transcription/translation of spoken language to printed text  using the Dragon dictation system.

## 2024-06-10 NOTE — PLAN OF CARE
Goal Outcome Evaluation:  Plan of Care Reviewed With: patient, spouse        Progress: no change  Outcome Evaluation: Pt in bed, wife and pt sharing lunch tray, pt pocketing food in his mouth. Despite mult requests to swallow chicken, pt unable to swallow and eventually req PT staff to remove pocketed food. Pt oriented to namd and  but is VERY slow to respond to commands. He req max A of 2 to sit EOB, He dem fair sitting balance but was unable to stand from EOB due to difficulty following commands and constant destractions. Pt dem ataxic movements B LE when attempting to perform seated LE ther ex. Pt was dependent to return supine and reposition in bed. Pt is certainly not safe to return home - wife appears to be in denial. Recommend SNF upon d/c      Anticipated Discharge Disposition (PT): skilled nursing facility

## 2024-06-11 ENCOUNTER — APPOINTMENT (OUTPATIENT)
Dept: GENERAL RADIOLOGY | Facility: HOSPITAL | Age: 74
End: 2024-06-11
Payer: MEDICARE

## 2024-06-11 LAB
ANION GAP SERPL CALCULATED.3IONS-SCNC: 9 MMOL/L (ref 5–15)
BUN SERPL-MCNC: 31 MG/DL (ref 8–23)
BUN/CREAT SERPL: 9.1 (ref 7–25)
CALCIUM SPEC-SCNC: 8 MG/DL (ref 8.6–10.5)
CHLORIDE SERPL-SCNC: 99 MMOL/L (ref 98–107)
CO2 SERPL-SCNC: 25 MMOL/L (ref 22–29)
CREAT SERPL-MCNC: 3.39 MG/DL (ref 0.76–1.27)
EGFRCR SERPLBLD CKD-EPI 2021: 18.1 ML/MIN/1.73
GLUCOSE BLDC GLUCOMTR-MCNC: 244 MG/DL (ref 70–130)
GLUCOSE BLDC GLUCOMTR-MCNC: 245 MG/DL (ref 70–130)
GLUCOSE BLDC GLUCOMTR-MCNC: 263 MG/DL (ref 70–130)
GLUCOSE BLDC GLUCOMTR-MCNC: 284 MG/DL (ref 70–130)
GLUCOSE SERPL-MCNC: 213 MG/DL (ref 65–99)
POTASSIUM SERPL-SCNC: 3.8 MMOL/L (ref 3.5–5.2)
SODIUM SERPL-SCNC: 133 MMOL/L (ref 136–145)

## 2024-06-11 PROCEDURE — 80048 BASIC METABOLIC PNL TOTAL CA: CPT

## 2024-06-11 PROCEDURE — 71045 X-RAY EXAM CHEST 1 VIEW: CPT

## 2024-06-11 PROCEDURE — 25010000002 THIAMINE HCL 200 MG/2ML SOLUTION: Performed by: INTERNAL MEDICINE

## 2024-06-11 PROCEDURE — 92610 EVALUATE SWALLOWING FUNCTION: CPT

## 2024-06-11 PROCEDURE — 25010000002 OLANZAPINE 10 MG RECONSTITUTED SOLUTION: Performed by: INTERNAL MEDICINE

## 2024-06-11 PROCEDURE — 25010000002 ENOXAPARIN PER 10 MG: Performed by: INTERNAL MEDICINE

## 2024-06-11 PROCEDURE — 82948 REAGENT STRIP/BLOOD GLUCOSE: CPT

## 2024-06-11 RX ADMIN — BRIMONIDINE TARTRATE 1 DROP: 2 SOLUTION OPHTHALMIC at 22:12

## 2024-06-11 RX ADMIN — HYDRALAZINE HYDROCHLORIDE 25 MG: 25 TABLET ORAL at 22:08

## 2024-06-11 RX ADMIN — TERAZOSIN HYDROCHLORIDE 1 MG: 1 CAPSULE ORAL at 22:08

## 2024-06-11 RX ADMIN — Medication 10 ML: at 11:08

## 2024-06-11 RX ADMIN — OLANZAPINE 10 MG: 10 INJECTION, POWDER, LYOPHILIZED, FOR SOLUTION INTRAMUSCULAR at 22:22

## 2024-06-11 RX ADMIN — TRAMADOL HYDROCHLORIDE 50 MG: 50 TABLET ORAL at 22:30

## 2024-06-11 RX ADMIN — SENNOSIDES AND DOCUSATE SODIUM 2 TABLET: 50; 8.6 TABLET ORAL at 22:08

## 2024-06-11 RX ADMIN — LATANOPROST 1 DROP: 50 SOLUTION OPHTHALMIC at 22:12

## 2024-06-11 RX ADMIN — HYDRALAZINE HYDROCHLORIDE 25 MG: 25 TABLET ORAL at 14:22

## 2024-06-11 RX ADMIN — ENOXAPARIN SODIUM 90 MG: 100 INJECTION SUBCUTANEOUS at 06:29

## 2024-06-11 RX ADMIN — Medication 1 APPLICATION: at 01:16

## 2024-06-11 RX ADMIN — Medication 1 APPLICATION: at 11:08

## 2024-06-11 RX ADMIN — HYDRALAZINE HYDROCHLORIDE 25 MG: 25 TABLET ORAL at 06:29

## 2024-06-11 RX ADMIN — SENNOSIDES AND DOCUSATE SODIUM 2 TABLET: 50; 8.6 TABLET ORAL at 11:07

## 2024-06-11 RX ADMIN — BRIMONIDINE TARTRATE 1 DROP: 2 SOLUTION OPHTHALMIC at 11:06

## 2024-06-11 RX ADMIN — METOPROLOL TARTRATE 12.5 MG: 25 TABLET, FILM COATED ORAL at 22:08

## 2024-06-11 RX ADMIN — DULOXETINE HYDROCHLORIDE 30 MG: 30 CAPSULE, DELAYED RELEASE ORAL at 22:12

## 2024-06-11 RX ADMIN — METOPROLOL TARTRATE 12.5 MG: 25 TABLET, FILM COATED ORAL at 11:07

## 2024-06-11 RX ADMIN — ASPIRIN 81 MG: 81 TABLET, COATED ORAL at 22:08

## 2024-06-11 RX ADMIN — Medication 10 ML: at 22:20

## 2024-06-11 RX ADMIN — ENOXAPARIN SODIUM 90 MG: 100 INJECTION SUBCUTANEOUS at 17:04

## 2024-06-11 RX ADMIN — AMIODARONE HYDROCHLORIDE 200 MG: 200 TABLET ORAL at 11:06

## 2024-06-11 RX ADMIN — THIAMINE HYDROCHLORIDE 100 MG: 100 INJECTION, SOLUTION INTRAMUSCULAR; INTRAVENOUS at 09:15

## 2024-06-11 NOTE — THERAPY EVALUATION
Acute Care - Speech Language Pathology   Swallow Initial Evaluation [facility]     Patient Name: Dilip Verma  : 1950  MRN: [id number]  Today's Date: 2024               Admit Date: 2024    Visit Dx:     ICD-10-CM ICD-9-CM   1. Acute respiratory failure with hypoxia  J96.01 518.81   2. Sepsis without acute organ dysfunction, due to unspecified organism  A41.9 038.9     995.91   3. Hyperkalemia  E87.5 276.7   4. Metabolic encephalopathy  G93.41 348.31   5. Acute UTI  N39.0 599.0   6. Hyperglycemia due to diabetes mellitus  E11.65 250.02   7. Chronic heart failure with preserved ejection fraction (HFpEF)  I50.32 428.9   8. Acute UTI (urinary tract infection)  N39.0 599.0     Patient Active Problem List   Diagnosis    Anxiety    Colon polyp    Type 2 diabetes mellitus with hyperglycemia, with long-term current use of insulin    Erectile dysfunction    Hyperlipidemia    Type 2 acute myocardial infarction    CAD (coronary artery disease)    Hx of CABG    Chronic diastolic heart failure    Hypersomnia due to medical condition    CSA (central sleep apnea)    Periodic breathing    HTN (hypertension)    History of stroke    CKD (chronic kidney disease) stage 3, GFR 30-59 ml/min    Urinary retention    Acute on chronic renal failure    Acute UTI (urinary tract infection)    Acute on chronic diastolic (congestive) heart failure    Bacteriuria    Rectal pain    Status post total replacement of hip    Vitamin D deficiency    Post-acute COVID-19 syndrome    Insulin dose changed    Arthropathy associated with neurological disorder    Personal history of colonic polyps    Type 2 diabetes mellitus with diabetic neuropathy, with long-term current use of insulin    Cervical spinal stenosis    Abscess of skin    Overweight    Cervical stenosis of spine    Spinal stenosis, lumbar region, without neurogenic claudication    Medically noncompliant    Chronic heart failure with preserved ejection fraction (HFpEF)     Carotid stenosis    Peripheral arterial disease    Sepsis    Hyperkalemia    Metabolic encephalopathy    AMENA (acute kidney injury)    Paroxysmal atrial fibrillation     Past Medical History:   Diagnosis Date    AMENA (acute kidney injury) 12/03/2020    Alcoholism 1989    not since 1998    CORI positive     Anemia     Anxiety     Ataxia     Atypical chest pain 04/19/2022    SEEN AT [facility] ER    Balance disorder     Carotid stenosis 3/28/2024    Cataract     BILATERAL, S/P EXTRACTION    Cervical radiculopathy 11/11/2019    SEEN AT  [facility] ER    Cervical spinal cord compression     Chronic diastolic (congestive) heart failure     Chronic kidney disease     STAGE 3, FOLLOWED BY DR. VIVAR    Chronic pancreatitis     Closed left subtrochanteric femur fracture 12/01/2020    ADMITTED TO [facility]    Closed nondisplaced intertrochanteric fracture of left femur 12/01/2020    ADMITTED TO [facility]    Colon polyps     FOLLOWED BY DR. AVTAR JEONG    Constipation     Contracture, right hand     Coronary artery disease     CABG 7/2019    COVID-19 07/2022    DDD (degenerative disc disease), cervical     Diabetes mellitus, type II     IDDM    Diabetic retinopathy     Difficulty walking     Dysphagia     Elevated brain natriuretic peptide (BNP) level 08/2014    Elevated LFTs 03/2021    Erectile dysfunction     Fissure, anal 2022    Foot drop     Fuchs' corneal dystrophy of right eye     Glaucoma     BILATERAL    History of alcohol abuse     Hyperlipidemia     Hypersomnia     Hypertension     Hypertensive urgency 02/25/2020    ADMITTED TO [facility]    Insomnia     Kidney stones     LV dysfunction 06/2016    Lyme disease     Lymphadenopathy syndrome 05/2021    Macular edema     BILATERAL    Myocardial infarction 11/05/2019    NSTEMI, ADMITTED TO [facility]    Myocardial infarction 07/12/2019    NSTEMI, ADMITTED TO [facility]    Neuropathy in diabetes     Non-celiac gluten sensitivity     Orthostasis     Osteoporosis     Oxygen dependent     PAD (peripheral artery disease)   "   Paroxysmal atrial fibrillation 6/6/2024    PNA (pneumonia) 06/2016    LEFT LOBE    Polyneuropathy     PTSD (post-traumatic stress disorder)     Pulmonary hypertension     Pulmonary nodule     Senile ectropion of both lower eyelids 02/2022    Sepsis 12/16/2020    D/T UTI, ADMITTED TO [facility]    Sleep apnea     STATES DOESN'T USE BIPAP OR CPAP    Spinal stenosis in cervical region     SEVERE-LIMITED ROM    Stroke 2012    \"slight stroke\"    Syncope and collapse 07/06/2019    ADMITTED TO [place]    Urinary retention 04/05/2021    SEEN AT [facility] ER    Vision loss     Vitamin D deficiency      Past Surgical History:   Procedure Laterality Date    CARDIAC CATHETERIZATION N/A 07/15/2019    Procedure: Coronary angiography;  Surgeon: Carrie Price MD;  Location:  [facility];  Service: Cardiovascular    CARDIAC CATHETERIZATION N/A 07/15/2019    Procedure: Left Heart Cath;  Surgeon: Carrie Price MD;  Location:  [facility];  Service: Cardiovascular    CARDIAC CATHETERIZATION N/A 07/15/2019    Procedure: Left ventriculography;  Surgeon: Carrie Price MD;  Location:  [facility];  Service: Cardiovascular    CARDIAC CATHETERIZATION  07/15/2019    Procedure: Functional Flow Kulm;  Surgeon: Carrie Price MD;  Location:  [facility];  Service: Cardiovascular    CARDIAC CATHETERIZATION N/A 11/06/2019    Procedure: Right and Left Heart Cath;  Surgeon: Mya Smith MD;  Location:  [facility];  Service: Cardiovascular    CARDIAC CATHETERIZATION N/A 11/06/2019    Procedure: Coronary angiography;  Surgeon: Mya Smith MD;  Location:  [facility];  Service: Cardiovascular    CARDIAC SURGERY      CATARACT EXTRACTION Left 2014    CATARACT EXTRACTION Right 11/2016    PHACO/IOL, DR. LEN DENTON    COLONOSCOPY N/A 03/16/2023    ENTIRE COLON WNL, RESCOPE IN 5 YRS, DR. LINDA LIZARRAGA AT [facility]    COLONOSCOPY W/ POLYPECTOMY N/A " 01/02/2015    A FEW DIVERTICULA IN SIGMOID, 6 MM TUBULOVILLOUS ADENOMA POLYP IN RECTUM, SMALL HEMORRHOIDS, MELANOSIS COLI, DR. AVTAR JEONG AT [facility]    CORONARY ARTERY BYPASS GRAFT N/A 07/18/2019    Procedure: INTRAOPERATIVE SHOAIB; STERNOTOMY CORONARY ARTERY BYPASS x 3  USING LEFT INTERNAL MAMMARY ARTERY GRAFT UTILIZING ENDOSCOPICALLY HARVESTED RIGHT GREATER SAPHENOUS VEIN AND PRP.;  Surgeon: Bill Devi MD;  Location: [facility] OR;  Service: Cardiothoracic    CYSTOSCOPY BLADDER STONE LITHOTRIPSY N/A     DENTAL PROCEDURE Bilateral     3 surgeries inder implants    FEMUR IM NAILING/RODDING Left 12/03/2020    Procedure: LEFT HIP INTRAMEDULLARY NAIL;  Surgeon: Niraj Ravi MD;  Location: [facility] OR;  Service: Orthopedic Spine;  Laterality: Left;    INCISION AND DRAINAGE PERIRECTAL ABSCESS N/A 10/04/2023    Procedure: Incision and drainage of perianal and buttock abscess;  Surgeon: Herbie Villatoro MD;  Location: [facility] OR;  Service: General;  Laterality: N/A;    INGUINAL HERNIA REPAIR Bilateral     TOENAIL EXCISION  06/2022    TONSILLECTOMY Bilateral     TOTAL HIP ARTHROPLASTY REVISION Left 01/11/2022    Procedure: TOTAL HIP ARTHROPLASTY REVISION- POSTERIOR;  Surgeon: Jurgen Candelaria II, MD;  Location: [facility] OR;  Service: Orthopedics;  Laterality: Left;    VASECTOMY N/A        SLP Recommendation and Plan  SLP Swallowing Diagnosis: moderate, oral dysphagia (06/11/24 1300)  SLP Diet Recommendation: mechanical ground textures, thin liquids (06/11/24 1300)  Recommended Precautions and Strategies: upright posture during/after eating, small bites of food and sips of liquid, general aspiration precautions, 1:1 supervision, assist with feeding (06/11/24 1300)  SLP Rec. for Method of Medication Administration: meds whole, with puree, as tolerated (06/11/24 1300)     Monitor for Signs of Aspiration: yes, notify SLP if any concerns (06/11/24 1300)  Recommended Diagnostics:  reassess via clinical swallow evaluation (06/11/24 1300)  Swallow Criteria for Skilled Therapeutic Interventions Met: demonstrates skilled criteria (06/11/24 1300)  Anticipated Discharge Disposition (SLP): unknown (06/11/24 1300)  Rehab Potential/Prognosis, Swallowing: good, to achieve stated therapy goals (06/11/24 1300)  Therapy Frequency (Swallow): PRN (06/11/24 1300)  Predicted Duration Therapy Intervention (Days): until discharge (06/11/24 1300)  Oral Care Recommendations: Oral Care BID/PRN (06/11/24 1300)        Daily Summary of Progress (SLP): progress toward functional goals as expected (06/11/24 1300)               Treatment Assessment (SLP): continued (06/11/24 1300)     Plan for Continued Treatment (SLP): continue treatment per plan of care (06/11/24 1300)         Plan of Care Reviewed With: patient  Outcome Evaluation: Pt seen for bedside swallow evaluation this date. Per RN, pt pocketing foods earlier. Spouse at bedside but on the phone and unable to obtain any further info. Pt accepted full range of consistencies. Prolonged and decreased mastication of regular solid that clears with cues to swallow again and liquid wash. No pocketing of bolus observed. Pt coughed after first trial of thin liquids, but no other overt s/s of aspiration with subsequent trials. Recommend mechanical ground, thin liquids, meds whole in applesauce as able. STRICT supervision with meals, feed assist, and aspiration precautions recommended. SLP will continue to follow for diet tolerance and possible upgrade as mentation improves.      SWALLOW EVALUATION (Last 72 Hours)       SLP Adult Swallow Evaluation       Row Name 06/11/24 1300       Rehab Evaluation    Document Type evaluation  -HF    Subjective Information no complaints  -HF    Patient Observations alert;cooperative;agree to therapy  -HF    Patient Effort adequate  -HF    Symptoms Noted During/After Treatment none  -HF       General Information    Patient Profile Reviewed  "yes  -HF    Pertinent History Of Current Problem \"This is a 74-year-old male with history of diabetes, hypertension, CAD, diastolic heart failure, chronic kidney disease stage III\". SLP consulted for swallow evaluation d/t pocketing food earlier this date. Pt does not recall this occurring.  -HF    Current Method of Nutrition regular textures;thin liquids  -HF    Precautions/Limitations, Vision WFL;for purposes of eval  -HF    Precautions/Limitations, Hearing WFL;for purposes of eval  -HF    Prior Level of Function-Communication unknown  -HF    Prior Level of Function-Swallowing no diet consistency restrictions  -HF    Plans/Goals Discussed with patient;agreed upon  -HF    Barriers to Rehab cognitive status  -HF       Pain    Additional Documentation Pain Scale: FACES Pre/Post-Treatment (Group)  -HF       Pain Scale: FACES Pre/Post-Treatment    Pain: FACES Scale, Pretreatment 0-->no hurt  -HF       Oral Motor Structure and Function    Dentition Assessment edentulous;natural, present and adequate  edentulous on top  -HF    Secretion Management WNL/WFL  -HF    Mucosal Quality moist, healthy  -HF       Oral Musculature and Cranial Nerve Assessment    Oral Motor General Assessment WFL  -HF       General Eating/Swallowing Observations    Respiratory Support Currently in Use nasal cannula  -HF    O2 Liters 4L  -HF    Eating/Swallowing Skills fed by SLP;needed assist  -HF    Positioning During Eating upright 90 degree;upright in bed  -HF    Consistencies Trialed regular textures;pureed;thin liquids;ice chips  -HF       Respiratory    Respiratory Status WFL;during swallowing/eating  -HF       Clinical Swallow Eval    Clinical Swallow Evaluation Summary Pt seen for bedside swallow evaluation this date. Per RN, pt pocketing foods earlier. Spouse at bedside but on the phone and unable to obtain any further info. Pt accepted PO trials of ice and thin liquid by tsp/straw, puree, and regular solid. Prolonged and decreased " mastication of regular solid that clears with cues to swallow again and liquid wash. No pocketing of bolus observed. Suspect mildly prolonged oral transit, premature spillage, and delayed swallow initiation. Subjectively functional laryngeal elevation per palpation. Pt coughed after first trial of thin liquids, but no other overt s/s of aspiration with subsequent trials. Recommend mechanical ground, thin liquids, meds whole in applesauce as able. STRICT supervision with meals, feed assist, and aspiration precautions recommended. SLP will continue to follow for diet tolerance and possible upgrade as mentation improves.  -HF       SLP Evaluation Clinical Impression    SLP Swallowing Diagnosis moderate;oral dysphagia  -HF    Functional Impact risk of aspiration/pneumonia  -HF    Rehab Potential/Prognosis, Swallowing good, to achieve stated therapy goals  -HF    Swallow Criteria for Skilled Therapeutic Interventions Met demonstrates skilled criteria  -HF       SLP Treatment Clinical Impressions    Treatment Assessment (SLP) continued  -HF    Daily Summary of Progress (SLP) progress toward functional goals as expected  -HF    Barriers to Overall Progress (SLP) Cognitive status  -HF    Plan for Continued Treatment (SLP) continue treatment per plan of care  -HF    Care Plan Review evaluation/treatment results reviewed;care plan/treatment goals reviewed;patient/other agree to care plan  -HF       Recommendations    Therapy Frequency (Swallow) PRN  -HF    Predicted Duration Therapy Intervention (Days) until discharge  -HF    SLP Diet Recommendation mechanical ground textures;thin liquids  -HF    Recommended Diagnostics reassess via clinical swallow evaluation  -HF    Recommended Precautions and Strategies upright posture during/after eating;small bites of food and sips of liquid;general aspiration precautions;1:1 supervision;assist with feeding  -HF    Oral Care Recommendations Oral Care BID/PRN  -HF    SLP Rec. for Method  of Medication Administration meds whole;with puree;as tolerated  -HF    Monitor for Signs of Aspiration yes;notify SLP if any concerns  -HF    Anticipated Discharge Disposition (SLP) unknown  -HF       Swallow Goals (SLP)    Swallow LTGs Patient will demonstrate functional swallow for  -HF    Swallow STGs diet tolerance goal selection (SLP)  -HF    Diet Tolerance Goal Selection (SLP) Patient will tolerate trials of  -HF       (LTG) Patient will demonstrate functional swallow for    Diet Texture (Demonstrate functional swallow) regular textures  -HF    Liquid viscosity (Demonstrate functional swallow) thin liquids  -HF    [place] (Demonstrate functional swallow) independently (over 90% accuracy)  -HF    Time Frame (Demonstrate functional swallow) by discharge  -HF              User Key  (r) = Recorded By, (t) = Taken By, (c) = Cosigned By      Initials Name Effective Dates    Suzie Riley SLP 08/01/23 -                     EDUCATION  The patient has been educated in the following areas:   Dysphagia (Swallowing Impairment) Oral Care/Hydration Modified Diet Instruction.        SLP GOALS       Row Name 06/11/24 1300       (LTG) Patient will demonstrate functional swallow for    Diet Texture (Demonstrate functional swallow) regular textures  -HF    Liquid viscosity (Demonstrate functional swallow) thin liquids  -HF    [place] (Demonstrate functional swallow) independently (over 90% accuracy)  -HF    Time Frame (Demonstrate functional swallow) by discharge  -HF              User Key  (r) = Recorded By, (t) = Taken By, (c) = Cosigned By      Initials Name Provider Type    Suzie Riley SLP Speech and Language Pathologist                         Time Calculation:    Time Calculation- SLP       Row Name 06/11/24 1348             Time Calculation- SLP    SLP Start Time 1230  -HF      SLP Received On 06/11/24  -HF         Untimed Charges    SLP Eval/Re-eval  ST Eval Oral Pharyng Swallow - 50406  -HF                 User Key  (r) = Recorded By, (t) = Taken By, (c) = Cosigned By      Initials Name Provider Type     Suzie Monge SLP Speech and Language Pathologist                    Therapy Charges for Today       Code Description Service Date Service Provider Modifiers Qty    [id number]  ST EVAL ORAL PHARYNG SWALLOW 4 6/11/2024 Suzie Monge SLP GN 1                 BRADLEY Hernandez  6/11/2024

## 2024-06-11 NOTE — PLAN OF CARE
Goal Outcome Evaluation:  Plan of Care Reviewed With: patient           Outcome Evaluation: Pt seen for bedside swallow evaluation this date. Per RN, pt pocketing foods earlier. Spouse at bedside but on the phone and unable to obtain any further info. Pt accepted full range of consistencies. Prolonged and decreased mastication of regular solid that clears with cues to swallow again and liquid wash. No pocketing of bolus observed. Pt coughed after first trial of thin liquids, but no other overt s/s of aspiration with subsequent trials. Recommend mechanical ground, thin liquids, meds whole in applesauce as able. STRICT supervision with meals, feed assist, and aspiration precautions recommended. SLP will continue to follow for diet tolerance and possible upgrade as mentation improves.      Anticipated Discharge Disposition (SLP): unknown          SLP Swallowing Diagnosis: moderate, oral dysphagia (06/11/24 1300)  Treatment Assessment (SLP): continued (06/11/24 1300)     Plan for Continued Treatment (SLP): continue treatment per plan of care (06/11/24 1300)

## 2024-06-11 NOTE — PROGRESS NOTES
PROGRESS NOTE      Patient Name: Dilip Verma  : 1950  MRN: [id number]  Primary Care Physician: Fernando Camargo DO  Date of admission: 2024    Patient Care Team:  Fernando Camargo DO as PCP - General (Family Medicine)  Morris Cai MD as Consulting Physician (Sleep Medicine)  Michaela Hylton MD as Consulting Physician (Cardiology)  Hardy Banerjee MD as Consulting Physician (Ophthalmology)  Chula Christianson MD as Consulting Physician (Ophthalmology)  Jason Sherman MD (Endocrinology)  Perla Mayen MD as Consulting Physician (Nephrology)  Federico Hebert MD as Consulting Physician (Pulmonary Disease)  Niraj Ravi MD as Consulting Physician (Orthopedic Surgery)  Papa Velasco MD as Consulting Physician (Urology)  Terese Yost MD as Consulting Physician (Gastroenterology)  Aracelis Ball MD as Consulting Physician (Colon and Rectal Surgery)  [facility] as Primary Care Provider        Reason for Follow up:     AMENA, CKD III      Subjective:     Feeling better   No distress         Review of Systems:         Constitutional: weakness.  HEENT:  No headache, otalgia, itchy eyes, nasal discharge or sore throat.  Cardiac:  No chest pain, dyspnea, orthopnea or PND.  Chest:  No cough, phlegm or wheezing.  Abdomen:  No abdominal pain, nausea or vomiting.  Neuro:  No focal weakness, abnormal movements or seizure-like activity.  :   No hematuria, no pyuria, no dysuria, no flank pain.  Extremities:  No  joint pains.  ROS was otherwise negative except as mentioned in the [place].    Social History:  reports that he quit smoking about 24 years ago. His smoking use included cigarettes. He started smoking about 40 years ago. He has a 12 pack-year smoking history. He has been exposed to tobacco smoke. He has never used smokeless tobacco. He reports that he does not currently use alcohol. He reports that he does not use drugs.    Medications:  Prior to  Admission medications    Medication Sig Start Date End Date Taking? Authorizing Provider   aspirin 81 MG EC tablet Take 1 tablet by mouth Every Night.   Yes Heri Rinaldi MD   brimonidine (ALPHAGAN) 0.2 % ophthalmic solution Administer 1 drop to both eyes 2 (Two) Times a Day.   Yes Heri Rinaldi MD   bumetanide (BUMEX) 1 MG tablet Take 1 tablet by mouth Daily.   Yes Heri Rinaldi MD   Cholecalciferol (Vitamin D-3) 125 MCG (5000 UT) tablet Take 1 tablet by mouth Daily.   Yes Heri Rinaldi MD   DULoxetine (CYMBALTA) 30 MG capsule Take 1 capsule by mouth Every Night. 1/16/24  Yes Heri Rinaldi MD   Insulin Glargine, 2 Unit Dial, (TOUJEO) 300 UNIT/ML solution pen-injector injection Inject 24 Units under the skin into the appropriate area as directed Daily.   Yes Heri Rinaldi MD   insulin lispro (humaLOG) 100 UNIT/ML injection Inject 0-14 Units under the skin into the appropriate area as directed 3 (Three) Times a Day Before Meals.  Patient taking differently: Inject 0-14 Units under the skin into the appropriate area as directed 3 (Three) Times a Day Before Meals. SLIDING SCALE  100-150 - 4 units  151-200 - 5 units  201-250 - 6 units  251-300 - 7 units  301-350 - 8 units  351-400 - 9 units  401+ - 10 units 12/7/20  Yes Amador Valverde MD   latanoprost (XALATAN) 0.005 % ophthalmic solution Administer 1 drop to both eyes every night at bedtime. 1/16/23  Yes Heri Rinaldi MD   multivitamin with minerals (MULTIVITAMIN ADULTS PO) Take 1 tablet by mouth Daily.   Yes Heri Rinaldi MD   testosterone (ANDROGEL) 25 MG/2.5GM (1%) gel gel Place 25 mg on the skin as directed by provider 2 (Two) Times a Week.   Yes Heri Rinaldi MD   traMADol (ULTRAM) 50 MG tablet Take 1 tablet by mouth At Night As Needed. 10/16/23  Yes Heri Rinaldi MD   Magnesium 400 MG capsule Take 400 mg by mouth As Needed.    Heri Rinaldi MD   sildenafil (REVATIO) 20 MG  tablet Take 1 tablet by mouth As Needed.    Provider, MD Heri     Scheduled Meds:amiodarone, 200 mg, Oral, Q24H  [Held by provider] apixaban, 5 mg, Oral, Q12H  aspirin, 81 mg, Oral, Daily  brimonidine, 1 drop, Both Eyes, BID  DULoxetine, 30 mg, Oral, Daily  enoxaparin, 1 mg/kg, Subcutaneous, BID  hydrALAZINE, 25 mg, Oral, Q8H  insulin lispro, 2-9 Units, Subcutaneous, 4x Daily AC & at Bedtime  latanoprost, 1 drop, Both Eyes, Nightly  Menthol-Zinc Oxide, 1 Application, Topical, Q12H  metoprolol tartrate, 12.5 mg, Oral, Q12H  OLANZapine, 5 mg, Oral, Nightly  senna-docusate sodium, 2 tablet, Oral, BID  sodium chloride, 10 mL, Intravenous, Q12H  terazosin, 1 mg, Oral, Nightly  thiamine, 100 mg, Intravenous, Daily  traMADol, 50 mg, Oral, Once      Continuous Infusions:dextrose, 75 mL/hr, Last Rate: 75 mL/hr (06/10/24 9051)        PRN Meds:  acetaminophen **OR** acetaminophen **OR** acetaminophen    senna-docusate sodium **AND** polyethylene glycol **AND** bisacodyl **AND** bisacodyl    dextrose    dextrose    glucagon (human recombinant)    nitroglycerin    OLANZapine    ondansetron    sodium chloride    sodium chloride    traMADol  Allergies:    Allergies   Allergen Reactions    Ace Inhibitors Angioedema    Angiotensin Receptor Blockers Angioedema and Unknown (See Comments)     Angioneurotic edema    Dapagliflozin Other (See Comments)     UTI,  rectal abcess    Losartan Angioedema     Angioneurotic edema    Seroquel [Quetiapine] Hallucinations    Xanax [Alprazolam] Unknown - High Severity    Amlodipine Swelling    Ativan [Lorazepam] Hallucinations    Baclofen Hallucinations    Misc. Sulfonamide Containing Compounds Unknown (See Comments)    Minoxidil Confusion    Tetracycline Rash     bliisters in mouth         Objective   Exam:     Vital Signs  Temp:  [97.7 °F (36.5 °C)-98.8 °F (37.1 °C)] 98.8 °F (37.1 °C)  Heart Rate:  [57-81] 81  Resp:  [16] 16  BP: ()/(42-97) 161/74  SpO2:  [85 %-97 %] 85 %  on  Flow  "(L/min):  [2] 2;   Device (Oxygen Therapy): nasal cannula  Body mass index is 31.02 kg/m².       Exam:     /74 (BP Location: Left arm, Patient Position: Lying)   Pulse 81   Temp 98.8 °F (37.1 °C) (Oral)   Resp 16   Ht 172.7 cm (68\")   Wt 92.5 kg (204 lb)   SpO2 (!) 85%   BMI 31.02 kg/m²     General Appearance:    Appears chronically ill, no distress   Head:    Normocephalic, atraumatic   Eyes:     EOM's intact, sclerae anicteric        Ears:    TMs not observed   Nose:   Patent without discharge   Neck:   Supple, JVD   Lungs:     Clear to auscultation bilaterally, respiratory effort is normal   Chest wall:    No tenderness   Heart:    Regular rate and rhythm, S1 and S2 normal, no   rub    or gallop   Abdomen:     Soft, nontender, nondistended,  no masses, no organomegaly   Extremities:   No edema   Neurologic:  No focal deficits.  Speech is fluent.  Conversation is coherent.      Results Review:  I have personally reviewed most recent Data :  [facility]  CBC    Results from last 7 days   Lab Units 06/06/24  0620   WBC 10*3/mm3 10.27   HEMOGLOBIN g/dL 12.9*   PLATELETS 10*3/mm3 261     CMP   Results from last 7 days   Lab Units 06/11/24  0729 06/10/24  0701 06/09/24  0612 06/08/24  0718 06/07/24  0651 06/06/24  1715 06/06/24  0826   SODIUM mmol/L 133* 136 142 146* 140 142 141   POTASSIUM mmol/L 3.8 3.3* 3.1* 3.5 3.8 3.9 4.0   CHLORIDE mmol/L 99 102 105 110* 107 106 106   CO2 mmol/L 25.0 23.1 25.0 23.4 21.3* 20.3* 22.7   BUN mg/dL 31* 23 24* 24* 28* 29* 28*   CREATININE mg/dL 3.39* 2.34* 1.63* 1.54* 1.48* 1.61* 1.83*   GLUCOSE mg/dL 213* 155* 125* 76 119* 78 76     ABG          No radiology results for the last 7 days    Results for orders placed during the hospital encounter of 05/30/24    Adult Transthoracic Echo Complete W/ Cont if Necessary Per Protocol    Interpretation Summary    Left ventricular systolic function is hyperdynamic (EF > 70%). Calculated left ventricular EF = 75% " Global longitudinal LV strain (GLS) = -17.8%. Left ventricle strain data was reviewed by the physician and found to be accurate. Global longitudinal LV strain is normal however there is regional abnormality with apical sparing suggestive of amyloid heart disease. However there is also basal ptal hypertrophy suggestive of hypertrophic cardiomyopathy. Wall motion abnormality is also noted in the basal septum and cannot rule out ischemic component.Consider additional imaging such as cardiac MRI and further evaluation also for amyloid heart disease as clinically indicated. The left ventricular cavity is small in size. Left ventricular wall thickness is consistent with moderate to severe septal asymmetric hypertrophy. There is hypokinesis of the left ventricular basal septum. Left ventricular diastolic function is consistent with (grade II w/high LAP) pseudonormalization.    The left atrial cavity is mildly dilated.    No aortic valve regurgitation or stenosis is present. The aortic valve is abnormal in structure. There is mild thickening of the aortic valve.    There is mild, bileaflet mitral valve thickening present. Trace mitral valve regurgitation is present. No significant mitral valve stenosis is present.    Moderate tricuspid valve regurgitation is present. Estimated right ventricular systolic pressure from tricuspid regurgitation is markedly elevated (>55 mmHg). Calculated right ventricular systolic pressure from tricuspid regurgitation is 74 mmHg.        Assessment & Plan   Assessment and Plan:         Acute UTI (urinary tract infection)    Type 2 diabetes mellitus with hyperglycemia, with long-term current use of insulin    CAD (coronary artery disease)    Hx of CABG    Chronic diastolic heart failure    CSA (central sleep apnea)    HTN (hypertension)    History of stroke    CKD (chronic kidney disease) stage 3, GFR 30-59 ml/min    Peripheral arterial disease    Sepsis    Hyperkalemia    Metabolic  encephalopathy    AMENA (acute kidney injury)    Paroxysmal atrial fibrillation    ASSESSMENT:  AMENA likely prerenal ATN 2/2 urosepsis with hemodynamic changes; on CKD III etiology likely diabetic and hypertensive nephropathy with baseline sCr ~0.9-1.2 mg/dL  Hyperkalemia, now resolved  UTI, urine cultures with gram neg bacilli  Leukocytosis  Metabolic encephalopathy  PAD  Hx stroke  HTN  CHF  CAD  DM2  Chronic splenic vein thrombosis  Possible hepatocellular disease/cirrhosis  Likely benign subpleural nodule with bilateral lymphadenopathy  Hiatal hernia    Last TTE 10/5/22 with EF 66%, grade II DD    PLAN :     AMENA likely prerenal ATN 2/2 urosepsis with hemodynamic changes; on CKD III etiology likely diabetic and hypertensive nephropathy with baseline sCr ~0.9-1.2 mg/dL  Creatinine again today 3.39. potassium is stble  Pt is on ivf d5w, bs is high   Will dc ivf    Off of Bumex 0.5 mg daily, will continue to hold  Repeat cxr today  Electrolytes, acid/base acceptable  Followed by infectious disease, evaluation noted, appreciate assistance.  BP trends acceptable  Cardiology following for new onset A-fib, evaluation noted  Avoid NSAIDs, nephrotoxic agents  We will follow and coordinate with team  Overall stable from renal standpoint  More confused today        Cristóbal Mayen MD  [facility]  6/11/2024  11:22 EDT

## 2024-06-11 NOTE — PROGRESS NOTES
Name: Dilip Verma ADMIT: 2024   : 1950  PCP: Fernando Camargo DO    MRN: [id number] LOS: 12 days   AGE/SEX: 74 y.o. male  ROOM: [facility]     Subjective   Subjective   Patient seen at bedside.       Objective   Objective   Vital Signs  Temp:  [97.7 °F (36.5 °C)-98.8 °F (37.1 °C)] 98.1 °F (36.7 °C)  Heart Rate:  [69-81] 69  Resp:  [16] 16  BP: ()/(42-97) 127/76  SpO2:  [88 %-94 %] 94 %  on  Flow (L/min):  [2-4] 4;   Device (Oxygen Therapy): nasal cannula  Body mass index is 31.02 kg/m².  Physical Exam  Vitals and nursing note reviewed.   Constitutional:       General: He is not in acute distress.     Appearance: He is ill-appearing (chronically). He is not toxic-appearing or diaphoretic.   HENT:      Head: Normocephalic and atraumatic.      Nose: Nose normal.      Mouth/Throat:      Mouth: Mucous membranes are moist.      Pharynx: Oropharynx is clear.   Eyes:      Conjunctiva/sclera: Conjunctivae normal.      Pupils: Pupils are equal, round, and reactive to light.   Cardiovascular:      Rate and Rhythm: Normal rate and regular rhythm.      Pulses: Normal pulses.   Pulmonary:      Effort: Pulmonary effort is normal.      Breath sounds: Normal breath sounds.   Abdominal:      General: Bowel sounds are normal.      Palpations: Abdomen is soft.      Tenderness: There is no abdominal tenderness.   Musculoskeletal:         General: Swelling (1-2+ BLE) present.      Cervical back: Neck supple.   Skin:     General: Skin is warm and dry.     Copied text material from yesterday's note has been reviewed for appropriate changes and remains accurate as of 24.      Results Review     I reviewed the patient's new clinical results.  Results from last 7 days   Lab Units 24  0620   WBC 10*3/mm3 10.27   HEMOGLOBIN g/dL 12.9*   PLATELETS 10*3/mm3 261     Results from last 7 days   Lab Units 24  0729 06/10/24  0701 24  0612 24  0718   SODIUM mmol/L 133* 136 142 146*   POTASSIUM mmol/L  3.8 3.3* 3.1* 3.5   CHLORIDE mmol/L 99 102 105 110*   CO2 mmol/L 25.0 23.1 25.0 23.4   BUN mg/dL 31* 23 24* 24*   CREATININE mg/dL 3.39* 2.34* 1.63* 1.54*   GLUCOSE mg/dL 213* 155* 125* 76   EGFR mL/min/1.73 18.1* 28.3* 43.7* 46.7*       Results from last 7 days   Lab Units 06/11/24  0729 06/10/24  0701 06/09/24  0612 06/08/24  0718   CALCIUM mg/dL 8.0* 8.0* 8.2* 8.8       Glucose   Date/Time Value Ref Range Status   06/11/2024 1620 244 (H) 70 - 130 mg/dL Final   06/11/2024 1104 284 (H) 70 - 130 mg/dL Final   06/11/2024 0550 263 (H) 70 - 130 mg/dL Final   06/10/2024 2018 156 (H) 70 - 130 mg/dL Final   06/10/2024 1557 129 70 - 130 mg/dL Final   06/10/2024 1111 174 (H) 70 - 130 mg/dL Final   06/10/2024 0632 152 (H) 70 - 130 mg/dL Final       XR Chest 1 View    Result Date: 6/11/2024  As described.    This report was finalized on 6/11/2024 2:46 PM by Dr. Gabe Zimmer M.D on Workstation: [id number]       I have personally reviewed all medications:  Scheduled Medications  amiodarone, 200 mg, Oral, Q24H  [Held by provider] apixaban, 5 mg, Oral, Q12H  aspirin, 81 mg, Oral, Daily  brimonidine, 1 drop, Both Eyes, BID  DULoxetine, 30 mg, Oral, Daily  enoxaparin, 1 mg/kg, Subcutaneous, BID  hydrALAZINE, 25 mg, Oral, Q8H  insulin lispro, 2-9 Units, Subcutaneous, 4x Daily AC & at Bedtime  latanoprost, 1 drop, Both Eyes, Nightly  Menthol-Zinc Oxide, 1 Application, Topical, Q12H  metoprolol tartrate, 12.5 mg, Oral, Q12H  OLANZapine, 5 mg, Oral, Nightly  senna-docusate sodium, 2 tablet, Oral, BID  sodium chloride, 10 mL, Intravenous, Q12H  terazosin, 1 mg, Oral, Nightly  thiamine, 100 mg, Intravenous, Daily  traMADol, 50 mg, Oral, Once    Infusions   Diet  Diet: Regular/House, Diabetic, Cardiac; Healthy Heart (2-3 Na+); Consistent Carbohydrate; Texture: Mechanical Ground (NDD 2); Fluid Consistency: Thin (IDDSI 0)    I have personally reviewed:  [x]  Laboratory   [x]  Microbiology   [x]  Radiology   [x]  EKG/Telemetry  [x]   Cardiology/Vascular   []  Pathology    []  Records       Assessment/Plan     Active Hospital Problems    Diagnosis  POA    **Acute UTI (urinary tract infection) [N39.0]  Yes    Paroxysmal atrial fibrillation [I48.0]  Yes    AMENA (acute kidney injury) [N17.9]  Yes    Sepsis [A41.9]  Yes    Hyperkalemia [E87.5]  Yes    Metabolic encephalopathy [G93.41]  Yes    Peripheral arterial disease [I73.9]  Yes    History of stroke [Z86.73]  Not Applicable    CKD (chronic kidney disease) stage 3, GFR 30-59 ml/min [N18.30]  Yes    HTN (hypertension) [I10]  Yes    CSA (central sleep apnea) [G47.31]  Yes    Chronic diastolic heart failure [I50.32]  Yes    Hx of CABG [Z95.1]  Not Applicable    CAD (coronary artery disease) [I25.10]  Yes    Type 2 diabetes mellitus with hyperglycemia, with long-term current use of insulin [E11.65, Z79.4]  Not Applicable      Resolved Hospital Problems   No resolved problems to display.       74 y.o. male admitted with Acute UTI (urinary tract infection).    Acute UTI/LLE Cellulitis  - urine culture grew Serratia  - completed 9d of abx-cefepime was discontinued 6/8 due to high suspicion of this causing toxic encephalopathy.  - doing well off of abx  - appreciate ID recs     HTNCAD/PAD/New Onset Afib (PAF)/Chronic Diastolic CHF  - BP acceptable, no anginal symptoms, in NSR  - continue on current regimen  - on AC with eliquis but not taking PO now due to severe encephalopathy-hold eliquis and ask pharmacy to dose lovenox  - appreciate cardiology recs     Type 2 DM  - had hypoglycemia, improved off of lantus  - continue D5W, getting thiamine also  - continue coverage with ssi/hypoglycemia protocol     AMENA  - likely pre-renal with ATN  - worse today, bumex stopped and getting IV D5  - appreciate nephrology recs     Hypoxia  - CXR noted, improved after additional dose of bumex  - monitor and encourage pulmonary toilet as able     Delirium/Toxic Encephalopathy  - continue on scheduled and PRN zyprexa  - he  is current with palliatus at home and takes PRN tramadol, this has been restarted     Hypokalemia  - replace K+       Tony Negrete MD  [facility]  06/11/24  16:59 EDT

## 2024-06-11 NOTE — PROGRESS NOTES
"  Infectious Diseases Progress Note    Tino Nagel MD     [facility]  Los: 12 days  Patient Identification:  Name: Dilip Verma  Age: 74 y.o.  Sex: male  :  1950  MRN: [id number]         Primary Care Physician: Fernando Camargo, DO        Subjective: Not as restless but still confused.  Interval History: See consultation note.  2024 MRSA screen is negative.  Objective:    Scheduled Meds:amiodarone, 200 mg, Oral, Q24H  [Held by provider] apixaban, 5 mg, Oral, Q12H  aspirin, 81 mg, Oral, Daily  brimonidine, 1 drop, Both Eyes, BID  DULoxetine, 30 mg, Oral, Daily  enoxaparin, 1 mg/kg, Subcutaneous, BID  hydrALAZINE, 25 mg, Oral, Q8H  insulin lispro, 2-9 Units, Subcutaneous, 4x Daily AC & at Bedtime  latanoprost, 1 drop, Both Eyes, Nightly  Menthol-Zinc Oxide, 1 Application, Topical, Q12H  metoprolol tartrate, 12.5 mg, Oral, Q12H  OLANZapine, 5 mg, Oral, Nightly  senna-docusate sodium, 2 tablet, Oral, BID  sodium chloride, 10 mL, Intravenous, Q12H  terazosin, 1 mg, Oral, Nightly  thiamine, 100 mg, Intravenous, Daily  traMADol, 50 mg, Oral, Once      Continuous Infusions:dextrose, 75 mL/hr, Last Rate: 75 mL/hr (06/10/24 0459)          Vital signs in last 24 hours:  Temp:  [97.7 °F (36.5 °C)-98.8 °F (37.1 °C)] 98.8 °F (37.1 °C)  Heart Rate:  [57-81] 81  Resp:  [16] 16  BP: ()/(42-97) 161/74    Intake/Output:  No intake or output data in the 24 hours ending 24 0830        Exam:  /74 (BP Location: Left arm, Patient Position: Lying)   Pulse 81   Temp 98.8 °F (37.1 °C) (Oral)   Resp 16   Ht 172.7 cm (68\")   Wt 92.5 kg (204 lb)   SpO2 (!) 85%   BMI 31.02 kg/m²   Patient is examined using the personal protective equipment as per guidelines from infection control for this particular patient as enacted.  Hand washing was performed before and after patient interaction.  General Appearance: Confused                          Head:    Normocephalic, without obvious abnormality, " atraumatic                           Eyes:    PERRL, conjunctivae/corneas clear, EOM's intact, both eyes                         Throat:   Lips, tongue, gums normal; oral mucosa pink and moist                           Neck:   Supple, symmetrical, trachea midline, no JVD                         Lungs:    Clear to auscultation bilaterally, respirations unlabored                 Chest Wall:    No tenderness or deformity                          Heart:  S1-S2 regular                  Abdomen:   Soft nontender                 Extremities: Left lower extremity wrapping removed and cellulitis is resolved.  Superficial laceration is also healed.  Residual edema and effect of compression stocking with unequal pressure leading indentation of the skin.  Left upper extremity appears erythematous with scabbed area on the medial aspect of the arm and appears slightly erythematous.                  Neurologic: No acute distress       Data Review:    I reviewed the patient's new clinical results.  Results from last 7 days   Lab Units 06/06/24  0620   WBC 10*3/mm3 10.27   HEMOGLOBIN g/dL 12.9*   PLATELETS 10*3/mm3 261     Results from last 7 days   Lab Units 06/11/24  0729 06/10/24  0701 06/09/24  0612 06/08/24  0718 06/07/24  0651 06/06/24  1715 06/06/24  0826   SODIUM mmol/L 133* 136 142 146* 140 142 141   POTASSIUM mmol/L 3.8 3.3* 3.1* 3.5 3.8 3.9 4.0   CHLORIDE mmol/L 99 102 105 110* 107 106 106   CO2 mmol/L 25.0 23.1 25.0 23.4 21.3* 20.3* 22.7   BUN mg/dL 31* 23 24* 24* 28* 29* 28*   CREATININE mg/dL 3.39* 2.34* 1.63* 1.54* 1.48* 1.61* 1.83*   CALCIUM mg/dL 8.0* 8.0* 8.2* 8.8 8.5* 8.6 8.7   GLUCOSE mg/dL 213* 155* 125* 76 119* 78 76     Microbiology Results (last 10 days)       Procedure Component Value - Date/Time    MRSA Screen, PCR (Inpatient) - Swab, Nares [[id number]]  (Normal) Collected: 06/04/24 0956    Lab Status: Final result Specimen: Swab from Nares Updated: 06/04/24 1112     MRSA PCR No MRSA Detected    Narrative:       The negative predictive value of this diagnostic test is high and should only be used to consider de-escalating anti-MRSA therapy. A positive result may indicate colonization with MRSA and must be correlated clinically.            Assessment:    Acute UTI (urinary tract infection)    Type 2 diabetes mellitus with hyperglycemia, with long-term current use of insulin    CAD (coronary artery disease)    Hx of CABG    Chronic diastolic heart failure    CSA (central sleep apnea)    HTN (hypertension)    History of stroke    CKD (chronic kidney disease) stage 3, GFR 30-59 ml/min    Peripheral arterial disease    Sepsis    Hyperkalemia    Metabolic encephalopathy    AMENA (acute kidney injury)    Paroxysmal atrial fibrillation  1-metabolic encephalopathy due to  2-systemic infection as a result of urinary tract infection as well as evolving sepsis traumatic cellulitis of the left leg.  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     Recommendations/Discussions:  Continue to observe off of antibiotic therapy  Local care and leg elevation for lymphedema and nursing care.  Make sure that patient gets graded pressure wrapping rather than an equal pressure on the wrapping which can cause localized edema and risk for infection.  Check venous Doppler of the left upper extremity.  Tino Nagel MD  6/11/2024  08:30 EDT    Parts of this note may be an electronic transcription/translation of spoken language to printed text using the Dragon dictation system.

## 2024-06-11 NOTE — PLAN OF CARE
Problem: Adult Inpatient Plan of Care  Goal: Plan of Care Review  Outcome: Ongoing, Progressing  Goal: Patient-Specific Goal (Individualized)  Outcome: Ongoing, Progressing  Goal: Absence of Hospital-Acquired Illness or Injury  Outcome: Ongoing, Progressing  Intervention: Identify and Manage Fall Risk  Recent Flowsheet Documentation  Taken 6/11/2024 0000 by Ginna Cedeno RN  Safety Promotion/Fall Prevention:   safety round/check completed   nonskid shoes/slippers when out of bed   lighting adjusted   fall prevention program maintained   clutter free environment maintained   activity supervised  Taken 6/10/2024 2200 by Ginna Cedeno RN  Safety Promotion/Fall Prevention: safety round/check completed  Taken 6/10/2024 2000 by Ginna Cedeno RN  Safety Promotion/Fall Prevention:   safety round/check completed   nonskid shoes/slippers when out of bed   lighting adjusted   fall prevention program maintained   clutter free environment maintained   activity supervised  Intervention: Prevent Skin Injury  Recent Flowsheet Documentation  Taken 6/11/2024 0000 by Ginna Cedeno RN  Body Position: weight shifting  Taken 6/10/2024 2200 by Ginna Cedeno RN  Body Position: weight shifting  Taken 6/10/2024 2000 by Ginna Cedeno RN  Body Position: weight shifting  Intervention: Prevent and Manage VTE (Venous Thromboembolism) Risk  Recent Flowsheet Documentation  Taken 6/11/2024 0000 by Ginna Cedeno RN  Activity Management: activity minimized  Taken 6/10/2024 2208 by Ginna Cedeno RN  Activity Management: activity minimized  Taken 6/10/2024 2200 by Ginna Cedeno RN  Activity Management: activity minimized  Taken 6/10/2024 2000 by Ginna Cedeno RN  Activity Management: activity minimized  Intervention: Prevent Infection  Recent Flowsheet Documentation  Taken 6/11/2024 0000 by Ginna Cedeno RN  Infection Prevention:   visitors restricted/screened   rest/sleep promoted   single patient room  provided   personal protective equipment utilized   hand hygiene promoted  Taken 6/10/2024 2200 by Ginna Cedeno RN  Infection Prevention:   visitors restricted/screened   single patient room provided   rest/sleep promoted   personal protective equipment utilized   hand hygiene promoted  Taken 6/10/2024 2000 by Ginna Cedeno RN  Infection Prevention:   rest/sleep promoted   visitors restricted/screened   single patient room provided   personal protective equipment utilized   hand hygiene promoted  Goal: Optimal Comfort and Wellbeing  Outcome: Ongoing, Progressing  Goal: Readiness for Transition of Care  Outcome: Ongoing, Progressing     Problem: Diabetes Comorbidity  Goal: Blood Glucose Level Within Targeted Range  Outcome: Ongoing, Progressing     Problem: Heart Failure Comorbidity  Goal: Maintenance of Heart Failure Symptom Control  Outcome: Ongoing, Progressing     Problem: Hypertension Comorbidity  Goal: Blood Pressure in Desired Range  Outcome: Ongoing, Progressing     Problem: Obstructive Sleep Apnea Risk or Actual Comorbidity Management  Goal: Unobstructed Breathing During Sleep  Outcome: Ongoing, Progressing     Problem: Skin Injury Risk Increased  Goal: Skin Health and Integrity  Outcome: Ongoing, Progressing  Intervention: Optimize Skin Protection  Recent Flowsheet Documentation  Taken 6/11/2024 0000 by Ginna Cedeno RN  Head of Bed (HOB) Positioning: HOB elevated  Taken 6/10/2024 2200 by Ginna Cedeno RN  Head of Bed (HOB) Positioning: HOB elevated  Taken 6/10/2024 2000 by Ginna Cedeno RN  Head of Bed (HOB) Positioning: HOB elevated     Problem: Fall Injury Risk  Goal: Absence of Fall and Fall-Related Injury  Outcome: Ongoing, Progressing  Intervention: Promote Injury-Free Environment  Recent Flowsheet Documentation  Taken 6/11/2024 0000 by Ginna Cedeno RN  Safety Promotion/Fall Prevention:   safety round/check completed   nonskid shoes/slippers when out of bed   lighting  adjusted   fall prevention program maintained   clutter free environment maintained   activity supervised  Taken 6/10/2024 2200 by Ginna Cedeno RN  Safety Promotion/Fall Prevention: safety round/check completed  Taken 6/10/2024 2000 by Ginna Cedeno RN  Safety Promotion/Fall Prevention:   safety round/check completed   nonskid shoes/slippers when out of bed   lighting adjusted   fall prevention program maintained   clutter free environment maintained   activity supervised     Problem: Restraint, Nonviolent  Goal: Absence of Harm or Injury  Outcome: Ongoing, Progressing  Intervention: Implement Least Restrictive Safety Strategies  Recent Flowsheet Documentation  Taken 6/10/2024 2200 by Ginna Cedeno RN  Medical Device Protection:   IV pole/bag removed from visual field   tubing secured  Less Restrictive Alternative:   bed alarm in use   surveillance provided   sensory stimulation limited   family presence promoted   calming techniques promoted   environment adjusted   safety enhancements provided  De-Escalation Techniques:   appropriate behavior reinforced   increased round frequency   medication administered   quiet time facilitated   stimulation decreased  Diversional Activities: television  Intervention: Protect Skin and Joint Integrity  Recent Flowsheet Documentation  Taken 6/11/2024 0000 by Ginna Cedeno RN  Body Position: weight shifting  Taken 6/10/2024 2200 by Ginna Cedeno RN  Body Position: weight shifting  Taken 6/10/2024 2000 by Ginna Cedeno RN  Body Position: weight shifting   Goal Outcome Evaluation:  Plan of Care Reviewed With: spouse, patient        Progress: improving

## 2024-06-11 NOTE — PROGRESS NOTES
"Nutrition Services    Patient Name:  Dilip Verma  YOB: 1950  MRN: [id number]  Admit Date:  5/30/2024Assessment Date:  06/11/24    LOS. 74 yoM admitted with confusion, dysuria, restlessness, and hallucinations. Pt is very confused. He has not eaten much during his stay related to confusion and being on antibiotics. His wife explains that he has been holding food in his cheeks and has had some coughing. He chewed up and spit out sausage this morning. Will have SLP evaluate. RD to follow.   Labs: Na 133L, K 3.8, BUN 31H, BG 284H  Meds: sliding scale insulin, pericolace, B1    NUTRITION SCREENING      Reason for Encounter LOS   Diagnosis/Problem Encephalopathy, acute UTI    PMH: DM, CKD3, ARF with hypoxia AMENA, CAD, HTN   PO Diet Diet: Regular/House, Diabetic, Cardiac; Healthy Heart (2-3 Na+); Consistent Carbohydrate; Texture: Regular (IDDSI 7); Fluid Consistency: Thin (IDDSI 0)   Supplements    PO Intake % 0-50%       Medications MAR reviewed by RD   Labs  Listed below, reviewed   Physical Findings Alert, disoriented, confused, 2+ edema teeth missing   GI Function Passing flatus, LBM 6/9   Skin Status Bruised, right LL abrasion        Height  Weight  BMI  Weight Trend     Height: 172.7 cm (68\")  Weight: 92.5 kg (204 lb) (06/03/24 0819)  Body mass index is 31.02 kg/m².  Gain       Nutrition Problem (PES) Inadequate oral intake related to encephalopathy/confusion as evidenced by decreased oral intake observed and evidence of pocking/spitting out foods.       Intervention/Plan SLP consult for swallow evaluation  Ordered boost GC BID     RD to follow up per protocol.     Results from last 7 days   Lab Units 06/11/24  0729 06/10/24  0701 06/09/24  0612   SODIUM mmol/L 133* 136 142   POTASSIUM mmol/L 3.8 3.3* 3.1*   CHLORIDE mmol/L 99 102 105   CO2 mmol/L 25.0 23.1 25.0   BUN mg/dL 31* 23 24*   CREATININE mg/dL 3.39* 2.34* 1.63*   CALCIUM mg/dL 8.0* 8.0* 8.2*   GLUCOSE mg/dL 213* 155* 125*     Results from " last 7 days   Lab Units 06/06/24  0620   HEMOGLOBIN g/dL 12.9*   HEMATOCRIT % 38.5     Lab Results   Component Value Date    HGBA1C 8.90 (H) 10/03/2023         Electronically signed by:  Susy Miller RD  06/11/24 11:59 EDT

## 2024-06-11 NOTE — NURSING NOTE
Patient refused some of his medications at the beginning of the shift. He was drowsy and he would not open his mouth or stay awake long enough for it. Wife at bedside and aware.

## 2024-06-12 ENCOUNTER — APPOINTMENT (OUTPATIENT)
Dept: CARDIOLOGY | Facility: HOSPITAL | Age: 74
End: 2024-06-12
Payer: MEDICARE

## 2024-06-12 LAB
AMMONIA BLD-SCNC: 31 UMOL/L (ref 16–60)
ANION GAP SERPL CALCULATED.3IONS-SCNC: 12.1 MMOL/L (ref 5–15)
BASOPHILS # BLD AUTO: 0.02 10*3/MM3 (ref 0–0.2)
BASOPHILS NFR BLD AUTO: 0.1 % (ref 0–1.5)
BH CV UPPER VENOUS LEFT AXILLARY AUGMENT: NORMAL
BH CV UPPER VENOUS LEFT AXILLARY COMPRESS: NORMAL
BH CV UPPER VENOUS LEFT AXILLARY PHASIC: NORMAL
BH CV UPPER VENOUS LEFT AXILLARY SPONT: NORMAL
BH CV UPPER VENOUS LEFT BASILIC FOREARM COMPRESS: NORMAL
BH CV UPPER VENOUS LEFT BASILIC UPPER COMPRESS: NORMAL
BH CV UPPER VENOUS LEFT BRACHIAL COMPRESS: NORMAL
BH CV UPPER VENOUS LEFT CEPHALIC FOREARM COLOR: 1
BH CV UPPER VENOUS LEFT CEPHALIC FOREARM COMPRESS: NORMAL
BH CV UPPER VENOUS LEFT CEPHALIC FOREARM THROMBUS: NORMAL
BH CV UPPER VENOUS LEFT CEPHALIC UPPER COLOR: 1
BH CV UPPER VENOUS LEFT CEPHALIC UPPER COMPRESS: NORMAL
BH CV UPPER VENOUS LEFT CEPHALIC UPPER THROMBUS: NORMAL
BH CV UPPER VENOUS LEFT INTERNAL JUGULAR AUGMENT: NORMAL
BH CV UPPER VENOUS LEFT INTERNAL JUGULAR COMPRESS: NORMAL
BH CV UPPER VENOUS LEFT INTERNAL JUGULAR PHASIC: NORMAL
BH CV UPPER VENOUS LEFT INTERNAL JUGULAR SPONT: NORMAL
BH CV UPPER VENOUS LEFT RADIAL COMPRESS: NORMAL
BH CV UPPER VENOUS LEFT SUBCLAVIAN AUGMENT: NORMAL
BH CV UPPER VENOUS LEFT SUBCLAVIAN COMPRESS: NORMAL
BH CV UPPER VENOUS LEFT SUBCLAVIAN PHASIC: NORMAL
BH CV UPPER VENOUS LEFT SUBCLAVIAN SPONT: NORMAL
BH CV UPPER VENOUS LEFT ULNAR COMPRESS: NORMAL
BH CV UPPER VENOUS RIGHT INTERNAL JUGULAR AUGMENT: NORMAL
BH CV UPPER VENOUS RIGHT INTERNAL JUGULAR COMPRESS: NORMAL
BH CV UPPER VENOUS RIGHT INTERNAL JUGULAR PHASIC: NORMAL
BH CV UPPER VENOUS RIGHT INTERNAL JUGULAR SPONT: NORMAL
BH CV UPPER VENOUS RIGHT SUBCLAVIAN AUGMENT: NORMAL
BH CV UPPER VENOUS RIGHT SUBCLAVIAN COMPRESS: NORMAL
BH CV UPPER VENOUS RIGHT SUBCLAVIAN PHASIC: NORMAL
BH CV UPPER VENOUS RIGHT SUBCLAVIAN SPONT: NORMAL
BH CV VAS PRELIMINARY FINDINGS SCRIPTING: 1
BUN SERPL-MCNC: 38 MG/DL (ref 8–23)
BUN/CREAT SERPL: 8.4 (ref 7–25)
CALCIUM SPEC-SCNC: 8.2 MG/DL (ref 8.6–10.5)
CHLORIDE SERPL-SCNC: 97 MMOL/L (ref 98–107)
CO2 SERPL-SCNC: 22.9 MMOL/L (ref 22–29)
CREAT SERPL-MCNC: 4.55 MG/DL (ref 0.76–1.27)
DEPRECATED RDW RBC AUTO: 41 FL (ref 37–54)
EGFRCR SERPLBLD CKD-EPI 2021: 12.7 ML/MIN/1.73
EOSINOPHIL # BLD AUTO: 0.17 10*3/MM3 (ref 0–0.4)
EOSINOPHIL NFR BLD AUTO: 1.2 % (ref 0.3–6.2)
ERYTHROCYTE [DISTWIDTH] IN BLOOD BY AUTOMATED COUNT: 12.5 % (ref 12.3–15.4)
GLUCOSE BLDC GLUCOMTR-MCNC: 167 MG/DL (ref 70–130)
GLUCOSE BLDC GLUCOMTR-MCNC: 186 MG/DL (ref 70–130)
GLUCOSE BLDC GLUCOMTR-MCNC: 189 MG/DL (ref 70–130)
GLUCOSE BLDC GLUCOMTR-MCNC: 223 MG/DL (ref 70–130)
GLUCOSE BLDC GLUCOMTR-MCNC: 246 MG/DL (ref 70–130)
GLUCOSE SERPL-MCNC: 168 MG/DL (ref 65–99)
HCT VFR BLD AUTO: 35.6 % (ref 37.5–51)
HGB BLD-MCNC: 12.1 G/DL (ref 13–17.7)
IMM GRANULOCYTES # BLD AUTO: 0.05 10*3/MM3 (ref 0–0.05)
IMM GRANULOCYTES NFR BLD AUTO: 0.4 % (ref 0–0.5)
LYMPHOCYTES # BLD AUTO: 1.14 10*3/MM3 (ref 0.7–3.1)
LYMPHOCYTES NFR BLD AUTO: 8.3 % (ref 19.6–45.3)
MCH RBC QN AUTO: 31.4 PG (ref 26.6–33)
MCHC RBC AUTO-ENTMCNC: 34 G/DL (ref 31.5–35.7)
MCV RBC AUTO: 92.5 FL (ref 79–97)
MONOCYTES # BLD AUTO: 0.98 10*3/MM3 (ref 0.1–0.9)
MONOCYTES NFR BLD AUTO: 7.2 % (ref 5–12)
NEUTROPHILS NFR BLD AUTO: 11.33 10*3/MM3 (ref 1.7–7)
NEUTROPHILS NFR BLD AUTO: 82.8 % (ref 42.7–76)
NRBC BLD AUTO-RTO: 0 /100 WBC (ref 0–0.2)
PLATELET # BLD AUTO: 229 10*3/MM3 (ref 140–450)
PMV BLD AUTO: 10.7 FL (ref 6–12)
POTASSIUM SERPL-SCNC: 4 MMOL/L (ref 3.5–5.2)
RBC # BLD AUTO: 3.85 10*6/MM3 (ref 4.14–5.8)
SODIUM SERPL-SCNC: 132 MMOL/L (ref 136–145)
WBC NRBC COR # BLD AUTO: 13.69 10*3/MM3 (ref 3.4–10.8)

## 2024-06-12 PROCEDURE — 82948 REAGENT STRIP/BLOOD GLUCOSE: CPT

## 2024-06-12 PROCEDURE — 86334 IMMUNOFIX E-PHORESIS SERUM: CPT | Performed by: INTERNAL MEDICINE

## 2024-06-12 PROCEDURE — 25810000003 SODIUM CHLORIDE 0.9 % SOLUTION: Performed by: INTERNAL MEDICINE

## 2024-06-12 PROCEDURE — 93971 EXTREMITY STUDY: CPT

## 2024-06-12 PROCEDURE — 85025 COMPLETE CBC W/AUTO DIFF WBC: CPT | Performed by: INTERNAL MEDICINE

## 2024-06-12 PROCEDURE — 84165 PROTEIN E-PHORESIS SERUM: CPT | Performed by: INTERNAL MEDICINE

## 2024-06-12 PROCEDURE — 82784 ASSAY IGA/IGD/IGG/IGM EACH: CPT | Performed by: INTERNAL MEDICINE

## 2024-06-12 PROCEDURE — 93971 EXTREMITY STUDY: CPT | Performed by: STUDENT IN AN ORGANIZED HEALTH CARE EDUCATION/TRAINING PROGRAM

## 2024-06-12 PROCEDURE — 80048 BASIC METABOLIC PNL TOTAL CA: CPT

## 2024-06-12 PROCEDURE — 25010000002 ENOXAPARIN PER 10 MG: Performed by: INTERNAL MEDICINE

## 2024-06-12 PROCEDURE — 25010000002 VANCOMYCIN 10 G RECONSTITUTED SOLUTION: Performed by: INTERNAL MEDICINE

## 2024-06-12 PROCEDURE — 63710000001 INSULIN LISPRO (HUMAN) PER 5 UNITS: Performed by: INTERNAL MEDICINE

## 2024-06-12 PROCEDURE — 97110 THERAPEUTIC EXERCISES: CPT

## 2024-06-12 PROCEDURE — 25010000002 THIAMINE HCL 200 MG/2ML SOLUTION: Performed by: INTERNAL MEDICINE

## 2024-06-12 PROCEDURE — 97530 THERAPEUTIC ACTIVITIES: CPT

## 2024-06-12 PROCEDURE — 84155 ASSAY OF PROTEIN SERUM: CPT | Performed by: INTERNAL MEDICINE

## 2024-06-12 PROCEDURE — 82140 ASSAY OF AMMONIA: CPT | Performed by: INTERNAL MEDICINE

## 2024-06-12 RX ORDER — ASPIRIN 81 MG/1
81 TABLET, CHEWABLE ORAL DAILY
Status: DISCONTINUED | OUTPATIENT
Start: 2024-06-13 | End: 2024-06-25 | Stop reason: HOSPADM

## 2024-06-12 RX ADMIN — Medication 10 ML: at 21:14

## 2024-06-12 RX ADMIN — Medication 10 ML: at 12:32

## 2024-06-12 RX ADMIN — BRIMONIDINE TARTRATE 1 DROP: 2 SOLUTION OPHTHALMIC at 21:09

## 2024-06-12 RX ADMIN — HYDRALAZINE HYDROCHLORIDE 25 MG: 25 TABLET ORAL at 05:44

## 2024-06-12 RX ADMIN — METOPROLOL TARTRATE 12.5 MG: 25 TABLET, FILM COATED ORAL at 10:19

## 2024-06-12 RX ADMIN — ENOXAPARIN SODIUM 90 MG: 100 INJECTION SUBCUTANEOUS at 05:44

## 2024-06-12 RX ADMIN — INSULIN LISPRO 2 UNITS: 100 INJECTION, SOLUTION INTRAVENOUS; SUBCUTANEOUS at 10:18

## 2024-06-12 RX ADMIN — LATANOPROST 1 DROP: 50 SOLUTION OPHTHALMIC at 21:09

## 2024-06-12 RX ADMIN — AMIODARONE HYDROCHLORIDE 200 MG: 200 TABLET ORAL at 10:19

## 2024-06-12 RX ADMIN — ENOXAPARIN SODIUM 90 MG: 100 INJECTION SUBCUTANEOUS at 17:22

## 2024-06-12 RX ADMIN — VANCOMYCIN HYDROCHLORIDE 1750 MG: 10 INJECTION, POWDER, LYOPHILIZED, FOR SOLUTION INTRAVENOUS at 23:09

## 2024-06-12 RX ADMIN — Medication 1 APPLICATION: at 21:13

## 2024-06-12 RX ADMIN — Medication 1 APPLICATION: at 12:33

## 2024-06-12 RX ADMIN — THIAMINE HYDROCHLORIDE 100 MG: 100 INJECTION, SOLUTION INTRAMUSCULAR; INTRAVENOUS at 10:19

## 2024-06-12 RX ADMIN — BRIMONIDINE TARTRATE 1 DROP: 2 SOLUTION OPHTHALMIC at 12:32

## 2024-06-12 RX ADMIN — SENNOSIDES AND DOCUSATE SODIUM 2 TABLET: 50; 8.6 TABLET ORAL at 10:19

## 2024-06-12 NOTE — PLAN OF CARE
Goal Outcome Evaluation:  Plan of Care Reviewed With: patient, spouse        Progress: no change  Outcome Evaluation: Pt seen for PT tx this AM.  Pt confused but more alert. Pt requires Max A of 2 with bed mobility and MaxA of sitting balance d/t pt's posterior lean. Pt then stood with MaxA of 2 with pt needing tyra feet blocked to keep from sliding. Pt was unable to take steps and leaning far left and difficulty with sequencing. Will continue to follow pt for d/c needs.

## 2024-06-12 NOTE — PROGRESS NOTES
PROGRESS NOTE      Patient Name: Dilip Vemra  : 1950  MRN: [id number]  Primary Care Physician: Fernando Camargo DO  Date of admission: 2024    Patient Care Team:  Fernando Camargo DO as PCP - General (Family Medicine)  Morris Cai MD as Consulting Physician (Sleep Medicine)  Michaela Hylton MD as Consulting Physician (Cardiology)  Hardy Banerjee MD as Consulting Physician (Ophthalmology)  Chula Christianson MD as Consulting Physician (Ophthalmology)  Jason Sherman MD (Endocrinology)  Perla Mayen MD as Consulting Physician (Nephrology)  Federico Hebert MD as Consulting Physician (Pulmonary Disease)  Niraj Ravi MD as Consulting Physician (Orthopedic Surgery)  Papa Velasco MD as Consulting Physician (Urology)  Terese Yost MD as Consulting Physician (Gastroenterology)  Aracelis Ball MD as Consulting Physician (Colon and Rectal Surgery)  [facility] as Primary Care Provider        Reason for Follow up:     AMENA, CKD III      Subjective:     Feeling better, no distress  Renal function worse today with sCr 4.55       Review of Systems:         Constitutional: weakness.  HEENT:  No headache, otalgia, itchy eyes, nasal discharge or sore throat.  Cardiac:  No chest pain, dyspnea, orthopnea or PND.  Chest:  No cough, phlegm or wheezing.  Abdomen:  No abdominal pain, nausea or vomiting.  Neuro:  No focal weakness, abnormal movements or seizure-like activity.  :   No hematuria, no pyuria, no dysuria, no flank pain.  Extremities:  No  joint pains.  ROS was otherwise negative except as mentioned in the [place].    Social History:  reports that he quit smoking about 24 years ago. His smoking use included cigarettes. He started smoking about 40 years ago. He has a 12 pack-year smoking history. He has been exposed to tobacco smoke. He has never used smokeless tobacco. He reports that he does not currently use alcohol. He reports that he does not use  drugs.    Medications:  Prior to Admission medications    Medication Sig Start Date End Date Taking? Authorizing Provider   aspirin 81 MG EC tablet Take 1 tablet by mouth Every Night.   Yes Heri Rinaldi MD   brimonidine (ALPHAGAN) 0.2 % ophthalmic solution Administer 1 drop to both eyes 2 (Two) Times a Day.   Yes Heri Rinaldi MD   bumetanide (BUMEX) 1 MG tablet Take 1 tablet by mouth Daily.   Yes Heri Rinaldi MD   Cholecalciferol (Vitamin D-3) 125 MCG (5000 UT) tablet Take 1 tablet by mouth Daily.   Yes Heri Rinaldi MD   DULoxetine (CYMBALTA) 30 MG capsule Take 1 capsule by mouth Every Night. 1/16/24  Yes Heri Rinaldi MD   Insulin Glargine, 2 Unit Dial, (TOUJEO) 300 UNIT/ML solution pen-injector injection Inject 24 Units under the skin into the appropriate area as directed Daily.   Yes Heri Rinaldi MD   insulin lispro (humaLOG) 100 UNIT/ML injection Inject 0-14 Units under the skin into the appropriate area as directed 3 (Three) Times a Day Before Meals.  Patient taking differently: Inject 0-14 Units under the skin into the appropriate area as directed 3 (Three) Times a Day Before Meals. SLIDING SCALE  100-150 - 4 units  151-200 - 5 units  201-250 - 6 units  251-300 - 7 units  301-350 - 8 units  351-400 - 9 units  401+ - 10 units 12/7/20  Yes Amador Valverde MD   latanoprost (XALATAN) 0.005 % ophthalmic solution Administer 1 drop to both eyes every night at bedtime. 1/16/23  Yes Heri Rinaldi MD   multivitamin with minerals (MULTIVITAMIN ADULTS PO) Take 1 tablet by mouth Daily.   Yes Heri Rinaldi MD   testosterone (ANDROGEL) 25 MG/2.5GM (1%) gel gel Place 25 mg on the skin as directed by provider 2 (Two) Times a Week.   Yes Heri Rinaldi MD   traMADol (ULTRAM) 50 MG tablet Take 1 tablet by mouth At Night As Needed. 10/16/23  Yes Heri Rinaldi MD   Magnesium 400 MG capsule Take 400 mg by mouth As Needed.    Heri Rinaldi MD    sildenafil (REVATIO) 20 MG tablet Take 1 tablet by mouth As Needed.    Provider, MD Heri     Scheduled Meds:amiodarone, 200 mg, Oral, Q24H  [Held by provider] apixaban, 5 mg, Oral, Q12H  aspirin, 81 mg, Oral, Daily  brimonidine, 1 drop, Both Eyes, BID  DULoxetine, 30 mg, Oral, Daily  enoxaparin, 1 mg/kg, Subcutaneous, BID  hydrALAZINE, 25 mg, Oral, Q8H  insulin lispro, 2-9 Units, Subcutaneous, 4x Daily AC & at Bedtime  latanoprost, 1 drop, Both Eyes, Nightly  Menthol-Zinc Oxide, 1 Application, Topical, Q12H  metoprolol tartrate, 12.5 mg, Oral, Q12H  OLANZapine, 5 mg, Oral, Nightly  senna-docusate sodium, 2 tablet, Oral, BID  sodium chloride, 10 mL, Intravenous, Q12H  terazosin, 1 mg, Oral, Nightly  thiamine, 100 mg, Intravenous, Daily  traMADol, 50 mg, Oral, Once      Continuous Infusions:       PRN Meds:  acetaminophen **OR** acetaminophen **OR** acetaminophen    senna-docusate sodium **AND** polyethylene glycol **AND** bisacodyl **AND** bisacodyl    dextrose    dextrose    glucagon (human recombinant)    nitroglycerin    OLANZapine    ondansetron    sodium chloride    sodium chloride  Allergies:    Allergies   Allergen Reactions    Ace Inhibitors Angioedema    Angiotensin Receptor Blockers Angioedema and Unknown (See Comments)     Angioneurotic edema    Dapagliflozin Other (See Comments)     UTI,  rectal abcess    Losartan Angioedema     Angioneurotic edema    Seroquel [Quetiapine] Hallucinations    Xanax [Alprazolam] Unknown - High Severity    Amlodipine Swelling    Ativan [Lorazepam] Hallucinations    Baclofen Hallucinations    Misc. Sulfonamide Containing Compounds Unknown (See Comments)    Minoxidil Confusion    Tetracycline Rash     bliisters in mouth         Objective   Exam:     Vital Signs  Temp:  [98.1 °F (36.7 °C)-98.6 °F (37 °C)] 98.1 °F (36.7 °C)  Heart Rate:  [67-76] 67  Resp:  [16-18] 18  BP: ()/(62-88) 109/62  SpO2:  [90 %-94 %] 93 %  on  Flow (L/min):  [3-4] 3;   Device (Oxygen  "Therapy): nasal cannula  Body mass index is 31.02 kg/m².       Exam:     /62 (BP Location: Left arm, Patient Position: Lying)   Pulse 67   Temp 98.1 °F (36.7 °C) (Oral)   Resp 18   Ht 172.7 cm (68\")   Wt 92.5 kg (204 lb)   SpO2 93%   BMI 31.02 kg/m²     General Appearance:    Appears chronically ill, no distress   Head:    Normocephalic, atraumatic   Eyes:     EOM's intact, sclerae anicteric        Ears:    TMs not observed   Nose:   Patent without discharge   Neck:   Supple, JVD   Lungs:     Clear to auscultation bilaterally, respiratory effort is normal   Chest wall:    No tenderness   Heart:    Regular rate and rhythm, S1 and S2 normal, no   rub    or gallop   Abdomen:     Soft, nontender, nondistended,  no masses, no organomegaly   Extremities:   No edema   Neurologic:  No focal deficits.  Speech is fluent.  Conversation is coherent.      Results Review:  I have personally reviewed most recent Data :  BMP @[facility](creatinine:10)  CBC    Results from last 7 days   Lab Units 06/12/24  0639 06/06/24  0620   WBC 10*3/mm3 13.69* 10.27   HEMOGLOBIN g/dL 12.1* 12.9*   PLATELETS 10*3/mm3 229 261     CMP   Results from last 7 days   Lab Units 06/12/24  0639 06/11/24  0729 06/10/24  0701 06/09/24  0612 06/08/24  0718 06/07/24  0651 06/06/24  1715   SODIUM mmol/L 132* 133* 136 142 146* 140 142   POTASSIUM mmol/L 4.0 3.8 3.3* 3.1* 3.5 3.8 3.9   CHLORIDE mmol/L 97* 99 102 105 110* 107 106   CO2 mmol/L 22.9 25.0 23.1 25.0 23.4 21.3* 20.3*   BUN mg/dL 38* 31* 23 24* 24* 28* 29*   CREATININE mg/dL 4.55* 3.39* 2.34* 1.63* 1.54* 1.48* 1.61*   GLUCOSE mg/dL 168* 213* 155* 125* 76 119* 78     ABG          XR Chest 1 View    Result Date: 6/11/2024  As described.    This report was finalized on 6/11/2024 2:46 PM by Dr. Gabe Zimmer M.D on Workstation: [id number]       Results for orders placed during the hospital encounter of 05/30/24    Adult Transthoracic Echo Complete W/ Cont if Necessary Per " Protocol    Interpretation Summary    Left ventricular systolic function is hyperdynamic (EF > 70%). Calculated left ventricular EF = 75% Global longitudinal LV strain (GLS) = -17.8%. Left ventricle strain data was reviewed by the physician and found to be accurate. Global longitudinal LV strain is normal however there is regional abnormality with apical sparing suggestive of amyloid heart disease. However there is also basal ptal hypertrophy suggestive of hypertrophic cardiomyopathy. Wall motion abnormality is also noted in the basal septum and cannot rule out ischemic component.Consider additional imaging such as cardiac MRI and further evaluation also for amyloid heart disease as clinically indicated. The left ventricular cavity is small in size. Left ventricular wall thickness is consistent with moderate to severe septal asymmetric hypertrophy. There is hypokinesis of the left ventricular basal septum. Left ventricular diastolic function is consistent with (grade II w/high LAP) pseudonormalization.    The left atrial cavity is mildly dilated.    No aortic valve regurgitation or stenosis is present. The aortic valve is abnormal in structure. There is mild thickening of the aortic valve.    There is mild, bileaflet mitral valve thickening present. Trace mitral valve regurgitation is present. No significant mitral valve stenosis is present.    Moderate tricuspid valve regurgitation is present. Estimated right ventricular systolic pressure from tricuspid regurgitation is markedly elevated (>55 mmHg). Calculated right ventricular systolic pressure from tricuspid regurgitation is 74 mmHg.        Assessment & Plan   Assessment and Plan:         Acute UTI (urinary tract infection)    Type 2 diabetes mellitus with hyperglycemia, with long-term current use of insulin    CAD (coronary artery disease)    Hx of CABG    Chronic diastolic heart failure    CSA (central sleep apnea)    HTN (hypertension)    History of stroke     CKD (chronic kidney disease) stage 3, GFR 30-59 ml/min    Peripheral arterial disease    Sepsis    Hyperkalemia    Metabolic encephalopathy    AMENA (acute kidney injury)    Paroxysmal atrial fibrillation    ASSESSMENT:  AMENA likely prerenal ATN 2/2 urosepsis with hemodynamic changes; on CKD III etiology likely diabetic and hypertensive nephropathy with baseline sCr ~0.9-1.2 mg/dL  Hyperkalemia, now resolved  UTI, urine cultures with gram neg bacilli  Leukocytosis  Metabolic encephalopathy  PAD  Hx stroke  HTN  CHF  CAD  DM2  Chronic splenic vein thrombosis  Possible hepatocellular disease/cirrhosis  Likely benign subpleural nodule with bilateral lymphadenopathy  Hiatal hernia    Last TTE 10/5/22 with EF 66%, grade II DD    PLAN :     AMENA likely prerenal ATN 2/2 urosepsis with hemodynamic changes; on CKD III etiology likely diabetic and hypertensive nephropathy with baseline sCr ~0.9-1.2 mg/dL  Creatinine much worse today at 4.55, BP with trends labile, some hypotension  Off IVF   Dc Hydralazine as BP is on low side   BUN 38 Cr 4.55 UOP on low side seem to be dense ATN pt sleepy but arouseable   D/W family about possiblity of dialysis in 1-2 days if not better   No urgent need for HD at this time   Potassium is acceptable no significant acidosis noted.  So no immediate need for renal replacement therapy  Off of Bumex 0.5 mg daily, will continue to hold  CXR 6/11 with pulmonary vasculature unremarkable, small mildly increased left basilar atelectasis or infiltrate  Electrolytes, acid/base acceptable  Followed by infectious disease, completed abx for urosepsis  Duplex venous US LUE with SVT  Cardiology following for new onset A-fib, evaluation noted  Avoid NSAIDs, nephrotoxic agents  We will follow and coordinate with team    Note transcribed for Dr Jm Oneal MD   [facility]  6/12/2024  12:55 EDT

## 2024-06-12 NOTE — THERAPY TREATMENT NOTE
Patient Name: Dilip Verma  : 1950    MRN: [id number]                              Today's Date: 2024       Admit Date: 2024    Visit Dx:     ICD-10-CM ICD-9-CM   1. Acute respiratory failure with hypoxia  J96.01 518.81   2. Sepsis without acute organ dysfunction, due to unspecified organism  A41.9 038.9     995.91   3. Hyperkalemia  E87.5 276.7   4. Metabolic encephalopathy  G93.41 348.31   5. Acute UTI  N39.0 599.0   6. Hyperglycemia due to diabetes mellitus  E11.65 250.02   7. Chronic heart failure with preserved ejection fraction (HFpEF)  I50.32 428.9   8. Acute UTI (urinary tract infection)  N39.0 599.0     Patient Active Problem List   Diagnosis    Anxiety    Colon polyp    Type 2 diabetes mellitus with hyperglycemia, with long-term current use of insulin    Erectile dysfunction    Hyperlipidemia    Type 2 acute myocardial infarction    CAD (coronary artery disease)    Hx of CABG    Chronic diastolic heart failure    Hypersomnia due to medical condition    CSA (central sleep apnea)    Periodic breathing    HTN (hypertension)    History of stroke    CKD (chronic kidney disease) stage 3, GFR 30-59 ml/min    Urinary retention    Acute on chronic renal failure    Acute UTI (urinary tract infection)    Acute on chronic diastolic (congestive) heart failure    Bacteriuria    Rectal pain    Status post total replacement of hip    Vitamin D deficiency    Post-acute COVID-19 syndrome    Insulin dose changed    Arthropathy associated with neurological disorder    Personal history of colonic polyps    Type 2 diabetes mellitus with diabetic neuropathy, with long-term current use of insulin    Cervical spinal stenosis    Abscess of skin    Overweight    Cervical stenosis of spine    Spinal stenosis, lumbar region, without neurogenic claudication    Medically noncompliant    Chronic heart failure with preserved ejection fraction (HFpEF)    Carotid stenosis    Peripheral arterial disease    Sepsis     Hyperkalemia    Metabolic encephalopathy    AMENA (acute kidney injury)    Paroxysmal atrial fibrillation     Past Medical History:   Diagnosis Date    AMENA (acute kidney injury) 12/03/2020    Alcoholism 1989    not since 1998    CORI positive     Anemia     Anxiety     Ataxia     Atypical chest pain 04/19/2022    SEEN AT [facility]    Balance disorder     Carotid stenosis 3/28/2024    Cataract     BILATERAL, S/P EXTRACTION    Cervical radiculopathy 11/11/2019    SEEN AT  [facility]    Cervical spinal cord compression     Chronic diastolic (congestive) heart failure     Chronic kidney disease     STAGE 3, FOLLOWED BY DR. VIVAR    Chronic pancreatitis     Closed left subtrochanteric femur fracture 12/01/2020    ADMITTED TO [facility]    Closed nondisplaced intertrochanteric fracture of left femur 12/01/2020    ADMITTED TO [facility]    Colon polyps     FOLLOWED BY DR. AVATR JEONG    Constipation     Contracture, right hand     Coronary artery disease     CABG 7/2019    COVID-19 07/2022    DDD (degenerative disc disease), cervical     Diabetes mellitus, type II     IDDM    Diabetic retinopathy     Difficulty walking     Dysphagia     Elevated brain natriuretic peptide (BNP) level 08/2014    Elevated LFTs 03/2021    Erectile dysfunction     Fissure, anal 2022    Foot drop     Fuchs' corneal dystrophy of right eye     Glaucoma     BILATERAL    History of alcohol abuse     Hyperlipidemia     Hypersomnia     Hypertension     Hypertensive urgency 02/25/2020    ADMITTED TO [facility]    Insomnia     Kidney stones     LV dysfunction 06/2016    Lyme disease     Lymphadenopathy syndrome 05/2021    Macular edema     BILATERAL    Myocardial infarction 11/05/2019    NSTEMI, ADMITTED TO [facility]    Myocardial infarction 07/12/2019    NSTEMI, ADMITTED TO [facility]    Neuropathy in diabetes     Non-celiac gluten sensitivity     Orthostasis     Osteoporosis     Oxygen dependent     PAD (peripheral artery disease)     Paroxysmal atrial fibrillation 6/6/2024    PNA (pneumonia)  "06/2016    LEFT LOBE    Polyneuropathy     PTSD (post-traumatic stress disorder)     Pulmonary hypertension     Pulmonary nodule     Senile ectropion of both lower eyelids 02/2022    Sepsis 12/16/2020    D/T UTI, ADMITTED TO [facility]    Sleep apnea     STATES DOESN'T USE BIPAP OR CPAP    Spinal stenosis in cervical region     SEVERE-LIMITED ROM    Stroke 2012    \"slight stroke\"    Syncope and collapse 07/06/2019    ADMITTED TO [place]    Urinary retention 04/05/2021    SEEN AT [facility] ER    Vision loss     Vitamin D deficiency      Past Surgical History:   Procedure Laterality Date    CARDIAC CATHETERIZATION N/A 07/15/2019    Procedure: Coronary angiography;  Surgeon: Carrie Price MD;  Location:  [facility];  Service: Cardiovascular    CARDIAC CATHETERIZATION N/A 07/15/2019    Procedure: Left Heart Cath;  Surgeon: Carrie Price MD;  Location:  [facility];  Service: Cardiovascular    CARDIAC CATHETERIZATION N/A 07/15/2019    Procedure: Left ventriculography;  Surgeon: Carrie Price MD;  Location:  [facility];  Service: Cardiovascular    CARDIAC CATHETERIZATION  07/15/2019    Procedure: Functional Flow [place];  Surgeon: Carrie Price MD;  Location:  [facility];  Service: Cardiovascular    CARDIAC CATHETERIZATION N/A 11/06/2019    Procedure: Right and Left Heart Cath;  Surgeon: Mya Smith MD;  Location:  [facility];  Service: Cardiovascular    CARDIAC CATHETERIZATION N/A 11/06/2019    Procedure: Coronary angiography;  Surgeon: Mya Smith MD;  Location:  [facility];  Service: Cardiovascular    CARDIAC SURGERY      CATARACT EXTRACTION Left 2014    CATARACT EXTRACTION Right 11/2016    PHACO/IOL, DR. LEN DENTON    COLONOSCOPY N/A 03/16/2023    ENTIRE COLON WNL, RESCOPE IN 5 YRS, DR. LINDA LIZARRAGA AT [facility]    COLONOSCOPY W/ POLYPECTOMY N/A 01/02/2015    A FEW DIVERTICULA IN SIGMOID, 6 MM TUBULOVILLOUS " ADENOMA POLYP IN RECTUM, SMALL HEMORRHOIDS, MELANOSIS COLI, DR. AVTAR JEONG AT [place] [facility]    CORONARY ARTERY BYPASS GRAFT N/A 07/18/2019    Procedure: INTRAOPERATIVE SHOAIB; STERNOTOMY CORONARY ARTERY BYPASS x 3  USING LEFT INTERNAL MAMMARY ARTERY GRAFT UTILIZING ENDOSCOPICALLY HARVESTED RIGHT GREATER SAPHENOUS VEIN AND PRP.;  Surgeon: Bill Devi MD;  Location: [facility] OR;  Service: Cardiothoracic    CYSTOSCOPY BLADDER STONE LITHOTRIPSY N/A     DENTAL PROCEDURE Bilateral     3 surgeries inder implants    FEMUR IM NAILING/RODDING Left 12/03/2020    Procedure: LEFT HIP INTRAMEDULLARY NAIL;  Surgeon: Niraj Ravi MD;  Location: [facility] OR;  Service: Orthopedic Spine;  Laterality: Left;    INCISION AND DRAINAGE PERIRECTAL ABSCESS N/A 10/04/2023    Procedure: Incision and drainage of perianal and buttock abscess;  Surgeon: Herbie Villatoro MD;  Location: [facility] OR;  Service: General;  Laterality: N/A;    INGUINAL HERNIA REPAIR Bilateral     TOENAIL EXCISION  06/2022    TONSILLECTOMY Bilateral     TOTAL HIP ARTHROPLASTY REVISION Left 01/11/2022    Procedure: TOTAL HIP ARTHROPLASTY REVISION- POSTERIOR;  Surgeon: Jurgen Candelaria II, MD;  Location: [facility] OR;  Service: Orthopedics;  Laterality: Left;    VASECTOMY N/A       General Information       Row Name 06/12/24 1310          Physical Therapy Time and Intention    Document Type therapy note (daily note)  -EB     Mode of Treatment co-treatment;occupational therapy;physical therapy  -EB       Row Name 06/12/24 1310          General Information    Patient Profile Reviewed yes  -EB     Existing Precautions/Restrictions fall;oxygen therapy device and L/min  -EB       Row Name 06/12/24 1310          Cognition    Orientation Status (Cognition) oriented to;person;place  -EB       Row Name 06/12/24 1310          Safety Issues, Functional Mobility    Safety Issues Affecting Function (Mobility) ability to follow commands;awareness  of need for assistance;insight into deficits/self-awareness;judgment;problem-solving;safety precaution awareness;safety precautions follow-through/compliance;impulsivity;sequencing abilities  -EB     Impairments Affecting Function (Mobility) balance;cognition;endurance/activity tolerance;strength;coordination;motor control  -EB     Comment, Safety Issues/Impairments (Mobility) Co treatment medically appropriate and necessary due to patient acuity level, activity tolerance and safety of patient and staff. Treatment is focusing on progression of care and goals established in the POC.  -EB               User Key  (r) = Recorded By, (t) = Taken By, (c) = Cosigned By      Initials Name Provider Type    Gina Nolasco PTA Physical Therapist Assistant                   Mobility       Row Name 06/12/24 1312          Bed Mobility    Scooting/Bridging [place] (Bed Mobility) moderate assist (50% patient effort);2 person assist  -EB     Supine-Sit [place] (Bed Mobility) maximum assist (25% patient effort);2 person assist;verbal cues;nonverbal cues (demo/gesture)  -EB     Sit-Supine [place] (Bed Mobility) maximum assist (25% patient effort);2 person assist;verbal cues;nonverbal cues (demo/gesture)  -EB     Assistive Device (Bed Mobility) bed rails;head of bed elevated  -EB     Comment, (Bed Mobility) --  -EB       Row Name 06/12/24 1312          Sit-Stand Transfer    Sit-Stand [place] (Transfers) maximum assist (25% patient effort);2 person assist  -EB     Assistive Device (Sit-Stand Transfers) walker, front-wheeled  -EB     Comment, (Sit-Stand Transfer) 2 stands from EOB  -EB               User Key  (r) = Recorded By, (t) = Taken By, (c) = Cosigned By      Initials Name Provider Type    Gina Nolasco PTA Physical Therapist Assistant                   Obj/Interventions       Row Name 06/12/24 1317          Balance    Balance Assessment standing static balance  -EB     Static Standing Balance maximum  assist;2-person assist  -EB     Position/Device Used, Standing Balance supported;walker, front-wheeled  -EB     Comment, Balance MaxA for sitting balance and MaxA of 2 for standing balance. pt leaning to left and posteriorly. Unsteady overall.  -EB               User Key  (r) = Recorded By, (t) = Taken By, (c) = Cosigned By      Initials Name Provider Type    Gina Nolasco PTA Physical Therapist Assistant                   Goals/Plan    No documentation.                  Clinical Impression       Row Name 06/12/24 1321          Plan of Care Review    Plan of Care Reviewed With patient;spouse  -EB     Progress no change  -EB     Outcome Evaluation Pt seen for PT tx this AM.  Pt confused but more alert. Pt requires Max A of 2 with bed mobility and MaxA of sitting balance d/t pt's posterior lean. Pt then stood with MaxA of 2 with pt needing tyra feet blocked to keep from sliding. Pt was unable to take steps and leaning far left and difficulty with sequencing. Will continue to follow pt for d/c needs.  -EB       Row Name 06/12/24 1321          Therapy Assessment/Plan (PT)    Therapy Frequency (PT) 3 times/wk  -EB       Row Name 06/12/24 1321          Positioning and Restraints    Pre-Treatment Position in bed  -EB     Post Treatment Position bed  -EB     In Bed supine;call light within reach;encouraged to call for assist;exit alarm on  -EB               User Key  (r) = Recorded By, (t) = Taken By, (c) = Cosigned By      Initials Name Provider Type    Gina Nolsaco PTA Physical Therapist Assistant                   Outcome Measures       Row Name 06/12/24 1320          How much help from another person do you currently need...    Turning from your back to your side while in flat bed without using bedrails? 2  -EB     Moving from lying on back to sitting on the side of a flat bed without bedrails? 2  -EB     Moving to and from a bed to a chair (including a wheelchair)? 1  -EB     Standing up from a chair using your  arms (e.g., wheelchair, bedside chair)? 2  -EB     Climbing 3-5 steps with a railing? 1  -EB     To walk in hospital room? 1  -EB     AM-PAC 6 Clicks Score (PT) 9  -EB     Highest Level of Mobility Goal 3 --> Sit at edge of bed  -EB       Row Name 06/12/24 1320          Modified Bartelso Scale    Modified Bartelso Scale 4 - Moderately severe disability.  Unable to walk without assistance, and unable to attend to own bodily needs without assistance.  -       Row Name 06/12/24 1158          Functional Assessment    Outcome Measure Options AM-PAC 6 Clicks Daily Activity (OT)  -MM (r) MF (t) MM (c)               User Key  (r) = Recorded By, (t) = Taken By, (c) = Cosigned By      Initials Name Provider Type    Gina Nolasco, PTA Physical Therapist Assistant    Annita Hi, OT Occupational Therapist    Annita Salinas, OT Student OT Student                                 Physical Therapy Education       Title: PT OT SLP Therapies (In Progress)       Topic: Physical Therapy (In Progress)       Point: Mobility training (In Progress)       Learning Progress Summary             Patient Acceptance, E,D, NL by EB at 6/12/2024 1321    Acceptance, E, NR by EM at 6/7/2024 1511    Acceptance, E,D, DU by PC at 6/5/2024 1518    Acceptance, E,D, DU,NR by PC at 6/4/2024 1557    Acceptance, E,D, DU by PC at 6/2/2024 1427    Acceptance, E,TB,D, VU by CB at 6/1/2024 2218    Acceptance, E,D, DU by PC at 5/31/2024 1612   Family Acceptance, E,TB,D, VU by CB at 6/1/2024 2218   Significant Other Acceptance, E, NR by EM at 6/7/2024 1511                         Point: Home exercise program (In Progress)       Learning Progress Summary             Patient Nonacceptance, E, NL,NR by DJ at 6/10/2024 1541    Acceptance, E,D, DU by PC at 6/5/2024 1518    Acceptance, E,D, DU,NR by PC at 6/4/2024 1557    Acceptance, E,D, DU by PC at 6/2/2024 1427    Acceptance, E,TB,D, VU by CB at 6/1/2024 2218    Acceptance, MARY ANN DILL DU by PC at 5/31/2024  1612   Family Nonacceptance, E, NL,NR by DJ at 6/10/2024 1541    Acceptance, E,TB,D, VU by CB at 6/1/2024 2218                         Point: Body mechanics (In Progress)       Learning Progress Summary             Patient Acceptance, E,D, NL by EB at 6/12/2024 1321    Nonacceptance, E, NL,NR by DJ at 6/10/2024 1541    Acceptance, E,D, DU by PC at 6/5/2024 1518    Acceptance, E,D, DU,NR by PC at 6/4/2024 1557    Acceptance, E,D, DU by PC at 6/2/2024 1427    Acceptance, E,TB,D, VU by CB at 6/1/2024 2218    Acceptance, E,D, DU by PC at 5/31/2024 1612   Family Nonacceptance, E, NL,NR by DJ at 6/10/2024 1541    Acceptance, E,TB,D, VU by CB at 6/1/2024 2218                         Point: Precautions (In Progress)       Learning Progress Summary             Patient Nonacceptance, E, NL,NR by DJ at 6/10/2024 1541    Acceptance, E,D, DU by PC at 6/5/2024 1518    Acceptance, E,D, DU,NR by PC at 6/4/2024 1557    Acceptance, E,D, DU by PC at 6/2/2024 1427    Acceptance, E,TB,D, VU by CB at 6/1/2024 2218    Acceptance, E,D, DU by PC at 5/31/2024 1612   Family Nonacceptance, E, NL,NR by DJ at 6/10/2024 1541    Acceptance, E,TB,D, VU by CB at 6/1/2024 2218                                         User Key       Initials Effective Dates Name Provider Type Discipline    PC 06/16/21 -  Gemma Hamilton, PT Physical Therapist PT    EM 06/16/21 -  Nila Toro, PT Physical Therapist PT    EB 02/14/23 -  Gina Fortune PTA Physical Therapist Assistant PT    DJ 10/25/19 -  Jacqueline Lizarraga, PT Physical Therapist PT    CB 02/21/24 -  Murphy Herndon, RN Registered Nurse Nurse                  PT Recommendation and Plan     Plan of Care Reviewed With: patient, spouse  Progress: no change  Outcome Evaluation: Pt seen for PT tx this AM.  Pt confused but more alert. Pt requires Max A of 2 with bed mobility and MaxA of sitting balance d/t pt's posterior lean. Pt then stood with MaxA of 2 with pt needing tyra feet blocked to keep from sliding. Pt  was unable to take steps and leaning far left and difficulty with sequencing. Will continue to follow pt for d/c needs.     Time Calculation:         PT Charges       Row Name 06/12/24 1309             Time Calculation    Start Time 0949  -EB      Stop Time 1012  -EB      Time Calculation (min) 23 min  -EB      PT Received On 06/12/24  -EB      PT - Next Appointment 06/14/24  -EB         Time Calculation- PT    Total Timed Code Minutes- PT 23 minute(s)  -EB                User Key  (r) = Recorded By, (t) = Taken By, (c) = Cosigned By      Initials Name Provider Type    Gina Nolasco PTA Physical Therapist Assistant                  Therapy Charges for Today       Code Description Service Date Service Provider Modifiers Qty    74117499455 HC PT THERAPEUTIC ACT EA 15 MIN 6/12/2024 Gina Fortune PTA GP 1    09117926618 HC PT THER PROC EA 15 MIN 6/12/2024 Gina Fortune PTA GP 1            PT G-Codes  Outcome Measure Options: AM-PAC 6 Clicks Daily Activity (OT)  AM-PAC 6 Clicks Score (PT): 9  AM-PAC 6 Clicks Score (OT): 6  Modified Jeremias Scale: 4 - Moderately severe disability.  Unable to walk without assistance, and unable to attend to own bodily needs without assistance.       Gina Fortune PTA  6/12/2024

## 2024-06-12 NOTE — PLAN OF CARE
Goal Outcome Evaluation:  Plan of Care Reviewed With: (P) patient, spouse        Progress: (P) no change  Outcome Evaluation: (P) Pt seen for OT treatment this date. Pt in bed and awake/alert upon arrival. Pt more responsive to name and questions but overall still very confused. Pt demo'd max A x2 bed mobility, siyting balance, and STS transfer. Pt was unable to follow commands to maintain sitting balance and displayed strong posterior lean. Pt attempted x2 STS transfer but unable to take side steps d/t sequencing and balance. Pt returned to EOB and completed mod A x2 scotting to head of bed. Overall, pt presents with poor activity tolerance and impairments in (I) and safety with ADLs and functional mobility. Pt is also currently dep for all ADLs. OT services would benefit pt to address noted deficits. Rec d/c SNF d/t cognitive status      Anticipated Discharge Disposition (OT): (P) skilled nursing facility                         Ketoconazole Counseling:   Patient counseled regarding improving absorption with orange juice.  Adverse effects include but are not limited to breast enlargement, headache, diarrhea, nausea, upset stomach, liver function test abnormalities, taste disturbance, and stomach pain.  There is a rare possibility of liver failure that can occur when taking ketoconazole. The patient understands that monitoring of LFTs may be required, especially at baseline. The patient verbalized understanding of the proper use and possible adverse effects of ketoconazole.  All of the patient's questions and concerns were addressed.

## 2024-06-12 NOTE — THERAPY TREATMENT NOTE
Patient Name: Dilip Verma  : 1950    MRN: [id number]                              Today's Date: 2024       Admit Date: 2024    Visit Dx:     ICD-10-CM ICD-9-CM   1. Acute respiratory failure with hypoxia  J96.01 518.81   2. Sepsis without acute organ dysfunction, due to unspecified organism  A41.9 038.9     995.91   3. Hyperkalemia  E87.5 276.7   4. Metabolic encephalopathy  G93.41 348.31   5. Acute UTI  N39.0 599.0   6. Hyperglycemia due to diabetes mellitus  E11.65 250.02   7. Chronic heart failure with preserved ejection fraction (HFpEF)  I50.32 428.9   8. Acute UTI (urinary tract infection)  N39.0 599.0     Patient Active Problem List   Diagnosis    Anxiety    Colon polyp    Type 2 diabetes mellitus with hyperglycemia, with long-term current use of insulin    Erectile dysfunction    Hyperlipidemia    Type 2 acute myocardial infarction    CAD (coronary artery disease)    Hx of CABG    Chronic diastolic heart failure    Hypersomnia due to medical condition    CSA (central sleep apnea)    Periodic breathing    HTN (hypertension)    History of stroke    CKD (chronic kidney disease) stage 3, GFR 30-59 ml/min    Urinary retention    Acute on chronic renal failure    Acute UTI (urinary tract infection)    Acute on chronic diastolic (congestive) heart failure    Bacteriuria    Rectal pain    Status post total replacement of hip    Vitamin D deficiency    Post-acute COVID-19 syndrome    Insulin dose changed    Arthropathy associated with neurological disorder    Personal history of colonic polyps    Type 2 diabetes mellitus with diabetic neuropathy, with long-term current use of insulin    Cervical spinal stenosis    Abscess of skin    Overweight    Cervical stenosis of spine    Spinal stenosis, lumbar region, without neurogenic claudication    Medically noncompliant    Chronic heart failure with preserved ejection fraction (HFpEF)    Carotid stenosis    Peripheral arterial disease    Sepsis     Hyperkalemia    Metabolic encephalopathy    AMENA (acute kidney injury)    Paroxysmal atrial fibrillation     Past Medical History:   Diagnosis Date    AMENA (acute kidney injury) 12/03/2020    Alcoholism 1989    not since 1998    CORI positive     Anemia     Anxiety     Ataxia     Atypical chest pain 04/19/2022    SEEN AT [facility]    Balance disorder     Carotid stenosis 3/28/2024    Cataract     BILATERAL, S/P EXTRACTION    Cervical radiculopathy 11/11/2019    SEEN AT  [facility]    Cervical spinal cord compression     Chronic diastolic (congestive) heart failure     Chronic kidney disease     STAGE 3, FOLLOWED BY DR. VIVAR    Chronic pancreatitis     Closed left subtrochanteric femur fracture 12/01/2020    ADMITTED TO [facility]    Closed nondisplaced intertrochanteric fracture of left femur 12/01/2020    ADMITTED TO [facility]    Colon polyps     FOLLOWED BY DR. AVTAR JEONG    Constipation     Contracture, right hand     Coronary artery disease     CABG 7/2019    COVID-19 07/2022    DDD (degenerative disc disease), cervical     Diabetes mellitus, type II     IDDM    Diabetic retinopathy     Difficulty walking     Dysphagia     Elevated brain natriuretic peptide (BNP) level 08/2014    Elevated LFTs 03/2021    Erectile dysfunction     Fissure, anal 2022    Foot drop     Fuchs' corneal dystrophy of right eye     Glaucoma     BILATERAL    History of alcohol abuse     Hyperlipidemia     Hypersomnia     Hypertension     Hypertensive urgency 02/25/2020    ADMITTED TO [facility]    Insomnia     Kidney stones     LV dysfunction 06/2016    Lyme disease     Lymphadenopathy syndrome 05/2021    Macular edema     BILATERAL    Myocardial infarction 11/05/2019    NSTEMI, ADMITTED TO [facility]    Myocardial infarction 07/12/2019    NSTEMI, ADMITTED TO [facility]    Neuropathy in diabetes     Non-celiac gluten sensitivity     Orthostasis     Osteoporosis     Oxygen dependent     PAD (peripheral artery disease)     Paroxysmal atrial fibrillation 6/6/2024    PNA (pneumonia)  "06/2016    LEFT LOBE    Polyneuropathy     PTSD (post-traumatic stress disorder)     Pulmonary hypertension     Pulmonary nodule     Senile ectropion of both lower eyelids 02/2022    Sepsis 12/16/2020    D/T UTI, ADMITTED TO [facility]    Sleep apnea     STATES DOESN'T USE BIPAP OR CPAP    Spinal stenosis in cervical region     SEVERE-LIMITED ROM    Stroke 2012    \"slight stroke\"    Syncope and collapse 07/06/2019    ADMITTED TO [facility]    Urinary retention 04/05/2021    SEEN AT [facility]    Vision loss     Vitamin D deficiency      Past Surgical History:   Procedure Laterality Date    CARDIAC CATHETERIZATION N/A 07/15/2019    Procedure: Coronary angiography;  Surgeon: Carrie Price MD;  Location:  [facility];  Service: Cardiovascular    CARDIAC CATHETERIZATION N/A 07/15/2019    Procedure: Left Heart Cath;  Surgeon: Carrie Price MD;  Location:  [facility];  Service: Cardiovascular    CARDIAC CATHETERIZATION N/A 07/15/2019    Procedure: Left ventriculography;  Surgeon: Carrie Price MD;  Location:  [facility];  Service: Cardiovascular    CARDIAC CATHETERIZATION  07/15/2019    Procedure: Functional Flow [place];  Surgeon: Carrie Price MD;  Location:  [facility];  Service: Cardiovascular    CARDIAC CATHETERIZATION N/A 11/06/2019    Procedure: Right and Left Heart Cath;  Surgeon: Mya Smith MD;  Location:  [facility];  Service: Cardiovascular    CARDIAC CATHETERIZATION N/A 11/06/2019    Procedure: Coronary angiography;  Surgeon: Mya Smith MD;  Location:  [facility];  Service: Cardiovascular    CARDIAC SURGERY      CATARACT EXTRACTION Left 2014    CATARACT EXTRACTION Right 11/2016    PHACO/IOL, DR. LEN DENTON    COLONOSCOPY N/A 03/16/2023    ENTIRE COLON WNL, RESCOPE IN 5 YRS, DR. LINDA LIZARRAGA AT [facility]    COLONOSCOPY W/ POLYPECTOMY N/A 01/02/2015    A FEW DIVERTICULA IN SIGMOID, 6 MM TUBULOVILLOUS " ADENOMA POLYP IN RECTUM, SMALL HEMORRHOIDS, MELANOSIS COLI, DR. AVTAR JEONG AT [place] [facility]    CORONARY ARTERY BYPASS GRAFT N/A 07/18/2019    Procedure: INTRAOPERATIVE SHOAIB; STERNOTOMY CORONARY ARTERY BYPASS x 3  USING LEFT INTERNAL MAMMARY ARTERY GRAFT UTILIZING ENDOSCOPICALLY HARVESTED RIGHT GREATER SAPHENOUS VEIN AND PRP.;  Surgeon: Bill Devi MD;  Location: [facility] OR;  Service: Cardiothoracic    CYSTOSCOPY BLADDER STONE LITHOTRIPSY N/A     DENTAL PROCEDURE Bilateral     3 surgeries inder implants    FEMUR IM NAILING/RODDING Left 12/03/2020    Procedure: LEFT HIP INTRAMEDULLARY NAIL;  Surgeon: Niraj Ravi MD;  Location: [facility] OR;  Service: Orthopedic Spine;  Laterality: Left;    INCISION AND DRAINAGE PERIRECTAL ABSCESS N/A 10/04/2023    Procedure: Incision and drainage of perianal and buttock abscess;  Surgeon: Herbie Villatoro MD;  Location: [facility] OR;  Service: General;  Laterality: N/A;    INGUINAL HERNIA REPAIR Bilateral     TOENAIL EXCISION  06/2022    TONSILLECTOMY Bilateral     TOTAL HIP ARTHROPLASTY REVISION Left 01/11/2022    Procedure: TOTAL HIP ARTHROPLASTY REVISION- POSTERIOR;  Surgeon: Jurgen Candelaria II, MD;  Location: [facility] OR;  Service: Orthopedics;  Laterality: Left;    VASECTOMY N/A       General Information       Row Name 06/12/24 1128          OT Time and Intention    Document Type therapy note (daily note) (P)   -MF     Mode of Treatment co-treatment;physical therapy;occupational therapy (P)   co treatment medically necessary d/t medical and cognitive status of pt  -MF       Row Name 06/12/24 1124          General Information    Patient Profile Reviewed yes (P)   -MF     Existing Precautions/Restrictions fall;oxygen therapy device and L/min (P)   -MF     Barriers to Rehab medically complex;cognitive status;previous functional deficit (P)   -MF       Row Name 06/12/24 1122          Cognition    Orientation Status (Cognition) oriented  to;person;place;verbal cues/prompts needed for orientation (P)   oriented to place with verbal cues  -       Row Name 06/12/24 1128          Safety Issues, Functional Mobility    Safety Issues Affecting Function (Mobility) ability to follow commands;awareness of need for assistance;insight into deficits/self-awareness;judgment;problem-solving;safety precaution awareness;safety precautions follow-through/compliance;sequencing abilities;impulsivity (P)   -     Impairments Affecting Function (Mobility) balance;cognition;endurance/activity tolerance;strength;coordination;motor control (P)   -     Cognitive Impairments, Mobility Safety/Performance attention;awareness, need for assistance;impulsivity;insight into deficits/self-awareness;judgment;problem-solving/reasoning;safety precaution awareness;safety precaution follow-through;sequencing abilities (P)   -               User Key  (r) = Recorded By, (t) = Taken By, (c) = Cosigned By      Initials Name Provider Type     Annita Tabares OT Student OT Student                     Mobility/ADL's       Row Name 06/12/24 1135          Bed Mobility    Bed Mobility bed mobility (all) activities;scooting/bridging;supine-sit;sit-supine (P)   -     All Activities, [place] (Bed Mobility) maximum assist (25% patient effort);2 person assist (P)   -     Scooting/Bridging [place] (Bed Mobility) moderate assist (50% patient effort);2 person assist (P)   -     Supine-Sit [place] (Bed Mobility) 2 person assist;verbal cues;maximum assist (25% patient effort) (P)   -     Sit-Supine [place] (Bed Mobility) maximum assist (25% patient effort);2 person assist (P)   -     Bed Mobility, Safety Issues cognitive deficits limit understanding;decreased use of arms for pushing/pulling;decreased use of legs for bridging/pushing;impaired trunk control for bed mobility (P)   -     Assistive Device (Bed Mobility) bed rails;draw sheet;head of bed elevated (P)    -MF       Row Name 06/12/24 1135          Transfers    Transfers stand-sit transfer;sit-stand transfer (P)   -MF       Row Name 06/12/24 1135          Sit-Stand Transfer    Sit-Stand [place] (Transfers) maximum assist (25% patient effort);2 person assist (P)   -MF     Assistive Device (Sit-Stand Transfers) walker, front-wheeled (P)   -MF     Comment, (Sit-Stand Transfer) x2 from EOB; unable to take side steps d/t strong posterior lean (P)   -MF       Row Name 06/12/24 1135          Stand-Sit Transfer    Stand-Sit [place] (Transfers) maximum assist (25% patient effort);2 person assist (P)   -MF     Assistive Device (Stand-Sit Transfers) walker, front-wheeled (P)   -MF       Row Name 06/12/24 1135          Functional Mobility    Functional Mobility- Safety Issues sequencing ability decreased;step length decreased;weight-shifting ability decreased;loses balance backward;supplemental O2 (P)   -MF     Functional Mobility- Comment unable to initiate/sequence side steps with rwx and strong posterior lean (P)   -MF     Patient was able to Ambulate no, other medical factors prevent ambulation (P)   -MF       Row Name 06/12/24 1135          Activities of Daily Living    BADL Assessment/Intervention lower body dressing;toileting (P)   -MF       Row Name 06/12/24 1135          Lower Body Dressing Assessment/Training    [place] Level (Lower Body Dressing) lower body dressing skills;dependent (less than 25% patient effort);don;socks (P)   -MF     Position (Lower Body Dressing) supine (P)   -MF       Row Name 06/12/24 1135          Toileting Assessment/Training    [place] Level (Toileting) toileting skills;dependent (less than 25% patient effort) (P)   -MF               User Key  (r) = Recorded By, (t) = Taken By, (c) = Cosigned By      Initials Name Provider Type    Annita Salinas OT Student OT Student                   Obj/Interventions       Row Name 06/12/24 1143          Motor Skills     Functional Endurance poor d/t cognitive status and O2 sat (P)   -       Row Name 06/12/24 1143          Balance    Balance Assessment sitting static balance;sitting dynamic balance;sit to stand dynamic balance;standing static balance;standing dynamic balance (P)   -     Static Sitting Balance maximum assist;2-person assist (P)   -MF     Dynamic Sitting Balance maximum assist;2-person assist (P)   -     Position, Sitting Balance supported;sitting edge of bed (P)   -MF     Sit to Stand Dynamic Balance maximum assist;2-person assist (P)   -MF     Static Standing Balance maximum assist;2-person assist (P)   -MF     Dynamic Standing Balance maximum assist;2-person assist (P)   -     Position/Device Used, Standing Balance walker, front-wheeled (P)   -     Balance Interventions sitting;standing;sit to stand;supported;static;dynamic;highly challenging (P)   -     Comment, Balance max A sitting balance this date; unable to correct/follow commands (P)   -               User Key  (r) = Recorded By, (t) = Taken By, (c) = Cosigned By      Initials Name Provider Type    Annita Salinas OT Student OT Student                   Goals/Plan    No documentation.                  Clinical Impression       Row Name 06/12/24 1151          Pain Assessment    Pretreatment Pain Rating 0/10 - no pain (P)   -     Posttreatment Pain Rating 0/10 - no pain (P)   -       Row Name 06/12/24 1151          Plan of Care Review    Plan of Care Reviewed With patient;spouse (P)   -     Progress no change (P)   -     Outcome Evaluation Pt seen for OT treatment this date. Pt in bed and awake/alert upon arrival. Pt more responsive to name and questions but overall still very confused. Pt demo'd max A x2 bed mobility, siyting balance, and STS transfer. Pt was unable to follow commands to maintain sitting balance and displayed strong posterior lean. Pt attempted x2 STS transfer but unable to take side steps d/t sequencing and  balance. Pt returned to EOB and completed mod A x2 scotting to head of bed. Overall, pt presents with poor activity tolerance and impairments in (I) and safety with ADLs and functional mobility. Pt is also currently dep for all ADLs. OT services would benefit pt to address noted deficits. Rec d/c SNF d/t cognitive status (P)   -       Row Name 06/12/24 1151          Therapy Assessment/Plan (OT)    Rehab Potential (OT) good, to achieve stated therapy goals (P)   -     Criteria for Skilled Therapeutic Interventions Met (OT) yes;skilled treatment is necessary (P)   -     Therapy Frequency (OT) 5 times/wk (P)   -       Row Name 06/12/24 1151          Therapy Plan Review/Discharge Plan (OT)    Anticipated Discharge Disposition (OT) skilled nursing facility (P)   -       Row Name 06/12/24 1151          Vital Signs    O2 Delivery Pre Treatment supplemental O2 (P)   -     O2 Delivery Intra Treatment supplemental O2 (P)   -     O2 Delivery Post Treatment supplemental O2 (P)   -       Row Name 06/12/24 1151          Positioning and Restraints    Pre-Treatment Position in bed (P)   -     Post Treatment Position bed (P)   -MF     In Bed notified nsg;supine;call light within reach;exit alarm on;with family/caregiver (P)   -               User Key  (r) = Recorded By, (t) = Taken By, (c) = Cosigned By      Initials Name Provider Type    Annita Salinas, OT Student OT Student                   Outcome Measures       Row Name 06/12/24 1153          How much help from another is currently needed...    Putting on and taking off regular lower body clothing? 1 (P)   -MF     Bathing (including washing, rinsing, and drying) 1 (P)   -MF     Toileting (which includes using toilet bed pan or urinal) 1 (P)   -MF     Putting on and taking off regular upper body clothing 1 (P)   -MF     Taking care of personal grooming (such as brushing teeth) 1 (P)   -MF     Eating meals 1 (P)   -MF     AM-PAC 6 Clicks Score (OT) 6  (P)   -       Row Name 06/12/24 1158          Functional Assessment    Outcome Measure Options AM-PAC 6 Clicks Daily Activity (OT) (P)   -               User Key  (r) = Recorded By, (t) = Taken By, (c) = Cosigned By      Initials Name Provider Type    KEV SherifAnnita sol, OT Student OT Student                    Occupational Therapy Education       Title: PT OT SLP Therapies (In Progress)       Topic: Occupational Therapy (Done)       Point: ADL training (Done)       Description:   Instruct learner(s) on proper safety adaptation and remediation techniques during self care or transfers.   Instruct in proper use of assistive devices.                  Learning Progress Summary             Patient Acceptance, E,TB,D, VU by CB at 6/1/2024 2218    Acceptance, E, VU by  at 5/31/2024 1056   Family Acceptance, E,TB,D, VU by  at 6/1/2024 2218    Acceptance, E, VU by  at 5/31/2024 1056                         Point: Home exercise program (Done)       Description:   Instruct learner(s) on appropriate technique for monitoring, assisting and/or progressing therapeutic exercises/activities.                  Learning Progress Summary             Patient Acceptance, E,TB,D, VU by CB at 6/1/2024 2218    Acceptance, E, VU by  at 5/31/2024 1056   Family Acceptance, E,TB,D, VU by CB at 6/1/2024 2218    Acceptance, E, VU by  at 5/31/2024 1056                         Point: Precautions (Done)       Description:   Instruct learner(s) on prescribed precautions during self-care and functional transfers.                  Learning Progress Summary             Patient Acceptance, E,TB,D, VU by CB at 6/1/2024 2218    Acceptance, E, VU by  at 5/31/2024 1056   Family Acceptance, E,TB,D, VU by CB at 6/1/2024 2218    Acceptance, E, VU by  at 5/31/2024 1056                         Point: Body mechanics (Done)       Description:   Instruct learner(s) on proper positioning and spine alignment during self-care, functional mobility  activities and/or exercises.                  Learning Progress Summary             Patient Acceptance, E,TB,D, VU by  at 6/1/2024 2218    Acceptance, E, VU by  at 5/31/2024 1056   Family Acceptance, E,TB,D, VU by  at 6/1/2024 2218    Acceptance, E, VU by  at 5/31/2024 1056                                         User Key       Initials Effective Dates Name Provider Type Discipline     02/21/24 -  Murphy Herndon, RN Registered Nurse Nurse     04/30/24 -  Annita Tabares OT Student OT Student OT                  OT Recommendation and Plan  Therapy Frequency (OT): (P) 5 times/wk  Plan of Care Review  Plan of Care Reviewed With: (P) patient, spouse  Progress: (P) no change  Outcome Evaluation: (P) Pt seen for OT treatment this date. Pt in bed and awake/alert upon arrival. Pt more responsive to name and questions but overall still very confused. Pt demo'd max A x2 bed mobility, siyting balance, and STS transfer. Pt was unable to follow commands to maintain sitting balance and displayed strong posterior lean. Pt attempted x2 STS transfer but unable to take side steps d/t sequencing and balance. Pt returned to EOB and completed mod A x2 scotting to head of bed. Overall, pt presents with poor activity tolerance and impairments in (I) and safety with ADLs and functional mobility. Pt is also currently dep for all ADLs. OT services would benefit pt to address noted deficits. Rec d/c SNF d/t cognitive status     Time Calculation:   Evaluation Complexity (OT)  Review Occupational Profile/Medical/Therapy History Complexity: expanded/moderate complexity  Assessment, Occupational Performance/Identification of Deficit Complexity: 3-5 performance deficits  Clinical Decision Making Complexity (OT): detailed assessment/moderate complexity  Overall Complexity of Evaluation (OT): moderate complexity     Time Calculation- OT       Row Name 06/12/24 1200             Time Calculation- OT    OT Start Time 0949 (P)   -KEV       OT Stop Time 1012 (P)   -      OT Time Calculation (min) 23 min (P)   -      Total Timed Code Minutes- OT 23 minute(s) (P)   -      OT Received On 06/12/24 (P)   -      OT - Next Appointment 06/13/24 (P)   -         Timed Charges    86165 - OT Therapeutic Activity Minutes 23 (P)   -         Total Minutes    Timed Charges Total Minutes 23 (P)   -       Total Minutes 23 (P)   -                User Key  (r) = Recorded By, (t) = Taken By, (c) = Cosigned By      Initials Name Provider Type     Annita Tabares OT Student OT Student                  Therapy Charges for Today       Code Description Service Date Service Provider Modifiers Qty    [id number] HC OT THERAPEUTIC ACT EA 15 MIN 6/12/2024 Annita Tabares OT Student GO 2                 Annita Tabares OT Student  6/12/2024

## 2024-06-12 NOTE — PLAN OF CARE
Goal Outcome Evaluation:  Plan of Care Reviewed With: patient, spouse        Progress: no change  Outcome Evaluation: VSS on 3.5L NC. Hypotensive at times, hydralazine held and then d/c by provider. Arouses to voice, sleepy. Alert to self only. Hopkins catheter placed per provider order d/t needing to be straight cathed and worsening kidney function. Minimal UOP. Wound care to coccyx and RLE. LUE + MD KELIN aware, propped up on pillows. Wife refuses for pt to take insulin at times. Pt eating <25% of all meals. Q2 turns. Palliative met with wife today, pt remains full code. Bed alarm on.

## 2024-06-12 NOTE — CONSULTS
Purpose of the visit was to evaluate for: goals of care/advanced care planning, support for patient/family, hospice referral/discussion, pain/symptom management, withdrawal of interventions, transfer to comfort care bed/unit, and comfort care. Spoke with RN as well as family and discussed palliative care, goals of care, care options, resuscitation status, [facility] scattered bed status, and discharge options.      Assessment:  Patient is a 75 YO male with a history of DM II with neuropathy, PAD, CAD, hx of CABG, chronic diastolic heart failure, hx CVA with right hemiparesis, hx MI, CKD III, remote hx ETOH abuse.   He was admitted to the hospital with urosepsis and AMS, for which he was treated with ABX.     Upon assessment the patient is resting in bed with eyes closed. His skin is pale, somewhat clammy and he has multiple scabs and erythema on BUE. He is chronically ill-appearing. He is somnolent and difficult to rouse, although he periodically opens his eyes and smiles. He otherwise demonstrates non purposeful movement of hands and arms as if hallucinating. He is reportedly unable to follow commands or participate meaningfully with P/OT at this time, and is taking very little PO.  PPS: 20%    Recommendations/Plan: No change to treatment plan. Stated goal is to continue to pursue all medical management of acute and chronic conditions, including CPR, cardioversion and intubation if indicated. Family is knowledgeable about patient's conditions, decision making is influenced by strong Scientology hannah.    Other Comments: I met with this patient and his spouse Beny at bedside to offer support and to discuss their goals of care. The patient was unable to participate in discussion due to AMS. Beny shared that she and the patient have been  for over 20 years, and that he is estranged from 3 of his 4 children. He does not have a living will. I spoke with Beny at great length regarding the patient's multiple  acute and chronic conditions, expected prognosis and code status. She is a good historian and knowledgeable about the above. She is optimistic that the patient will recover, and therefore wishes to continue to pursue all medical interventions indicated to promote wellness and recovery. She directs that we perform CPR, cardioversion and intubation if indicated. She expressed her hannah that he will be healed if it is God's will, and will not deviate from a path of recovery. Beny described a progression of worsening illness and memory impairment that began about 4 years ago. She described impaired sleep patterns with anxiety as well as intermittent hallucinations. She attributes most of these changes to medications and acute illness, although it seems to be progressive and accompanied by functional decline.   Beny is very appreciate of the conversation but feels that end-of-life care discussion is premature. The patient is current with [facility] outpatient.   Beny does not want to pursue inpatient rehab, she wishes to bring the patient home with . She stated that she can meet his needs along with support from their Taoism family.     I advised her of our availability and all questions answered.   Palliative Care will sign off, please re consult if further support is needed.    Angelica Ricardo, Palliative Care Referral RN

## 2024-06-13 LAB
ALBUMIN SERPL ELPH-MCNC: 2.7 G/DL (ref 2.9–4.4)
ALBUMIN/GLOB SERPL: 0.9 {RATIO} (ref 0.7–1.7)
ALPHA1 GLOB SERPL ELPH-MCNC: 0.4 G/DL (ref 0–0.4)
ALPHA2 GLOB SERPL ELPH-MCNC: 0.8 G/DL (ref 0.4–1)
ANION GAP SERPL CALCULATED.3IONS-SCNC: 12.5 MMOL/L (ref 5–15)
APTT PPP: 48.4 SECONDS (ref 22.7–35.4)
B-GLOBULIN SERPL ELPH-MCNC: 0.8 G/DL (ref 0.7–1.3)
BASOPHILS # BLD AUTO: 0.02 10*3/MM3 (ref 0–0.2)
BASOPHILS NFR BLD AUTO: 0.2 % (ref 0–1.5)
BUN SERPL-MCNC: 49 MG/DL (ref 8–23)
BUN/CREAT SERPL: 9.3 (ref 7–25)
CALCIUM SPEC-SCNC: 8.3 MG/DL (ref 8.6–10.5)
CHLORIDE SERPL-SCNC: 97 MMOL/L (ref 98–107)
CO2 SERPL-SCNC: 21.5 MMOL/L (ref 22–29)
CREAT SERPL-MCNC: 5.26 MG/DL (ref 0.76–1.27)
DEPRECATED RDW RBC AUTO: 43.5 FL (ref 37–54)
EGFRCR SERPLBLD CKD-EPI 2021: 10.7 ML/MIN/1.73
EOSINOPHIL # BLD AUTO: 0.22 10*3/MM3 (ref 0–0.4)
EOSINOPHIL NFR BLD AUTO: 2.3 % (ref 0.3–6.2)
ERYTHROCYTE [DISTWIDTH] IN BLOOD BY AUTOMATED COUNT: 12.6 % (ref 12.3–15.4)
GAMMA GLOB SERPL ELPH-MCNC: 1.2 G/DL (ref 0.4–1.8)
GLOBULIN SER-MCNC: 3.2 G/DL (ref 2.2–3.9)
GLUCOSE BLDC GLUCOMTR-MCNC: 145 MG/DL (ref 70–130)
GLUCOSE BLDC GLUCOMTR-MCNC: 183 MG/DL (ref 70–130)
GLUCOSE BLDC GLUCOMTR-MCNC: 196 MG/DL (ref 70–130)
GLUCOSE BLDC GLUCOMTR-MCNC: 206 MG/DL (ref 70–130)
GLUCOSE SERPL-MCNC: 149 MG/DL (ref 65–99)
HCT VFR BLD AUTO: 33.1 % (ref 37.5–51)
HGB BLD-MCNC: 11.1 G/DL (ref 13–17.7)
IGA SERPL-MCNC: 251 MG/DL (ref 61–437)
IGG SERPL-MCNC: 1237 MG/DL (ref 603–1613)
IGM SERPL-MCNC: 82 MG/DL (ref 15–143)
IMM GRANULOCYTES # BLD AUTO: 0.06 10*3/MM3 (ref 0–0.05)
IMM GRANULOCYTES NFR BLD AUTO: 0.6 % (ref 0–0.5)
INR PPP: 1.17 (ref 0.9–1.1)
INTERPRETATION SERPL IEP-IMP: ABNORMAL
LABORATORY COMMENT REPORT: ABNORMAL
LYMPHOCYTES # BLD AUTO: 1.06 10*3/MM3 (ref 0.7–3.1)
LYMPHOCYTES NFR BLD AUTO: 10.9 % (ref 19.6–45.3)
M PROTEIN SERPL ELPH-MCNC: ABNORMAL G/DL
MCH RBC QN AUTO: 31.7 PG (ref 26.6–33)
MCHC RBC AUTO-ENTMCNC: 33.5 G/DL (ref 31.5–35.7)
MCV RBC AUTO: 94.6 FL (ref 79–97)
MONOCYTES # BLD AUTO: 0.86 10*3/MM3 (ref 0.1–0.9)
MONOCYTES NFR BLD AUTO: 8.8 % (ref 5–12)
NEUTROPHILS NFR BLD AUTO: 7.54 10*3/MM3 (ref 1.7–7)
NEUTROPHILS NFR BLD AUTO: 77.2 % (ref 42.7–76)
NRBC BLD AUTO-RTO: 0 /100 WBC (ref 0–0.2)
PLATELET # BLD AUTO: 209 10*3/MM3 (ref 140–450)
PMV BLD AUTO: 10.6 FL (ref 6–12)
POTASSIUM SERPL-SCNC: 3.9 MMOL/L (ref 3.5–5.2)
PROT SERPL-MCNC: 5.9 G/DL (ref 6–8.5)
PROTHROMBIN TIME: 15.1 SECONDS (ref 11.7–14.2)
RBC # BLD AUTO: 3.5 10*6/MM3 (ref 4.14–5.8)
SODIUM SERPL-SCNC: 131 MMOL/L (ref 136–145)
VANCOMYCIN SERPL-MCNC: 22.8 MCG/ML (ref 5–40)
WBC NRBC COR # BLD AUTO: 9.76 10*3/MM3 (ref 3.4–10.8)

## 2024-06-13 PROCEDURE — 85025 COMPLETE CBC W/AUTO DIFF WBC: CPT | Performed by: INTERNAL MEDICINE

## 2024-06-13 PROCEDURE — 85730 THROMBOPLASTIN TIME PARTIAL: CPT | Performed by: INTERNAL MEDICINE

## 2024-06-13 PROCEDURE — 25010000002 HEPARIN (PORCINE) 25000-0.45 UT/250ML-% SOLUTION: Performed by: INTERNAL MEDICINE

## 2024-06-13 PROCEDURE — 85610 PROTHROMBIN TIME: CPT | Performed by: INTERNAL MEDICINE

## 2024-06-13 PROCEDURE — 80048 BASIC METABOLIC PNL TOTAL CA: CPT

## 2024-06-13 PROCEDURE — 25010000002 THIAMINE HCL 200 MG/2ML SOLUTION: Performed by: INTERNAL MEDICINE

## 2024-06-13 PROCEDURE — 25010000002 HEPARIN (PORCINE) PER 1000 UNITS: Performed by: INTERNAL MEDICINE

## 2024-06-13 PROCEDURE — 25010000002 ENOXAPARIN PER 10 MG: Performed by: INTERNAL MEDICINE

## 2024-06-13 PROCEDURE — 80202 ASSAY OF VANCOMYCIN: CPT | Performed by: INTERNAL MEDICINE

## 2024-06-13 PROCEDURE — 82948 REAGENT STRIP/BLOOD GLUCOSE: CPT

## 2024-06-13 RX ORDER — HEPARIN SODIUM 5000 [USP'U]/ML
30-43.2 INJECTION, SOLUTION INTRAVENOUS; SUBCUTANEOUS EVERY 6 HOURS PRN
Status: DISCONTINUED | OUTPATIENT
Start: 2024-06-13 | End: 2024-06-13

## 2024-06-13 RX ORDER — HEPARIN SODIUM 10000 [USP'U]/100ML
10.8 INJECTION, SOLUTION INTRAVENOUS
Status: DISCONTINUED | OUTPATIENT
Start: 2024-06-13 | End: 2024-06-13

## 2024-06-13 RX ORDER — HEPARIN SODIUM 10000 [USP'U]/100ML
10.4 INJECTION, SOLUTION INTRAVENOUS
Status: DISCONTINUED | OUTPATIENT
Start: 2024-06-13 | End: 2024-06-24

## 2024-06-13 RX ORDER — HEPARIN SODIUM 5000 [USP'U]/ML
30-41.7 INJECTION, SOLUTION INTRAVENOUS; SUBCUTANEOUS EVERY 6 HOURS PRN
Status: DISCONTINUED | OUTPATIENT
Start: 2024-06-13 | End: 2024-06-24

## 2024-06-13 RX ADMIN — Medication 10 ML: at 22:31

## 2024-06-13 RX ADMIN — HEPARIN SODIUM 10.4 UNITS/KG/HR: 10000 INJECTION, SOLUTION INTRAVENOUS at 22:52

## 2024-06-13 RX ADMIN — ENOXAPARIN SODIUM 90 MG: 100 INJECTION SUBCUTANEOUS at 18:57

## 2024-06-13 RX ADMIN — DULOXETINE HYDROCHLORIDE 30 MG: 30 CAPSULE, DELAYED RELEASE ORAL at 22:22

## 2024-06-13 RX ADMIN — OLANZAPINE 5 MG: 5 TABLET, FILM COATED ORAL at 22:22

## 2024-06-13 RX ADMIN — Medication 1 APPLICATION: at 08:21

## 2024-06-13 RX ADMIN — TERAZOSIN HYDROCHLORIDE 1 MG: 1 CAPSULE ORAL at 22:22

## 2024-06-13 RX ADMIN — ENOXAPARIN SODIUM 90 MG: 100 INJECTION SUBCUTANEOUS at 05:44

## 2024-06-13 RX ADMIN — HEPARIN SODIUM 4000 UNITS: 5000 INJECTION INTRAVENOUS; SUBCUTANEOUS at 22:52

## 2024-06-13 RX ADMIN — BRIMONIDINE TARTRATE 1 DROP: 2 SOLUTION OPHTHALMIC at 22:30

## 2024-06-13 RX ADMIN — METOPROLOL TARTRATE 12.5 MG: 25 TABLET, FILM COATED ORAL at 22:22

## 2024-06-13 RX ADMIN — AMIODARONE HYDROCHLORIDE 200 MG: 200 TABLET ORAL at 08:20

## 2024-06-13 RX ADMIN — SENNOSIDES AND DOCUSATE SODIUM 2 TABLET: 50; 8.6 TABLET ORAL at 08:20

## 2024-06-13 RX ADMIN — LATANOPROST 1 DROP: 50 SOLUTION OPHTHALMIC at 22:31

## 2024-06-13 RX ADMIN — Medication 1 APPLICATION: at 22:29

## 2024-06-13 RX ADMIN — ASPIRIN 81 MG: 81 TABLET, CHEWABLE ORAL at 08:20

## 2024-06-13 RX ADMIN — BRIMONIDINE TARTRATE 1 DROP: 2 SOLUTION OPHTHALMIC at 08:20

## 2024-06-13 RX ADMIN — THIAMINE HYDROCHLORIDE 100 MG: 100 INJECTION, SOLUTION INTRAMUSCULAR; INTRAVENOUS at 08:20

## 2024-06-13 RX ADMIN — Medication 10 ML: at 08:29

## 2024-06-13 NOTE — PROGRESS NOTES
"[facility] Clinical Pharmacy Services: Vancomycin Pharmacokinetic Initial Consult Note    Dilip Verma is a 74 y.o. male who is on day 1 of pharmacy to dose vancomycin.    Indication: Skin and Soft Tissue  Consulting Provider: Dr. Nagel  Planned Duration of Therapy: 7 days  Loading Dose Ordered or Given: 1750 mg on 6/12 at 2309  MRSA PCR performed: yes on 6/4 (NO MRSA detected)   Target: intermittent dosing d/t reduced renal fx  Other Antimicrobials: previously on IV abx per ID but most recently observing off abx therapy ([facility])    Vitals/Labs  Ht: 172.7 cm (68\"); Wt: 92.5 kg (204 lb)  Temp Readings from Last 1 Encounters:   06/12/24 97.5 °F (36.4 °C)    Estimated Creatinine Clearance: 15.5 mL/min (A) (by C-G formula based on SCr of 4.55 mg/dL (H)).        Results from last 7 days   Lab Units 06/12/24  0639 06/11/24  0729 06/10/24  0701 06/06/24  0826 06/06/24  0620   CREATININE mg/dL 4.55* 3.39* 2.34*   < > 1.78*   WBC 10*3/mm3 13.69*  --   --   --  10.27    < > = values in this interval not displayed.     Assessment/Plan:  Pt with worsening AMENA on CKD therefore will dose intermittently. Plan for possible dialysis in next 1-2 days if does not improve per Nephrology. Pt rec'd LD vancomycin 6/12 @ 2309. Vanc Random has been ordered for 6/13 at 1800.     Pharmacy will follow patient's kidney function and will adjust doses and obtain levels as necessary. Thank you for involving pharmacy in this patient's care. Please contact pharmacy with any questions or concerns.                           Laureano Lance, [facility]  Clinical Pharmacist    "

## 2024-06-13 NOTE — PLAN OF CARE
Problem: Adult Inpatient Plan of Care  Goal: Plan of Care Review  Outcome: Ongoing, Progressing  Flowsheets (Taken 6/13/2024 1849)  Progress: improving  Outcome Evaluation: Pt has been much more alert compared to previous shift. Oriented x3 during entire shift today, very pleasant and cooperative. Wife continues to be extermely disruptive to his care and frequently argues with staff and declines treatment. Pt complains of no pain during shift. Wound care and dressing changes complete.     Problem: Adult Inpatient Plan of Care  Goal: Absence of Hospital-Acquired Illness or Injury  Outcome: Ongoing, Progressing  Intervention: Identify and Manage Fall Risk  Recent Flowsheet Documentation  Taken 6/13/2024 1032 by Lidia Salguero RN  Safety Promotion/Fall Prevention:   activity supervised   assistive device/personal items within reach   clutter free environment maintained   fall prevention program maintained   room organization consistent   safety round/check completed  Taken 6/13/2024 0820 by Lidia Salguero RN  Safety Promotion/Fall Prevention:   activity supervised   assistive device/personal items within reach   clutter free environment maintained   fall prevention program maintained   room organization consistent   safety round/check completed  Intervention: Prevent Skin Injury  Recent Flowsheet Documentation  Taken 6/13/2024 1032 by Lidia Salguero RN  Body Position:   foot of bed elevated   weight shifting  Taken 6/13/2024 0820 by Lidia Salguero RN  Body Position:   foot of bed elevated   weight shifting  Skin Protection:   adhesive use limited   incontinence pads utilized   skin-to-device areas padded   tubing/devices free from skin contact  Intervention: Prevent and Manage VTE (Venous Thromboembolism) Risk  Recent Flowsheet Documentation  Taken 6/13/2024 1008 by Lidia Salguero RN  Activity Management: bedrest  Taken 6/13/2024 0820 by Lidia Salguero RN  Activity Management: bedrest  Range of  Motion:   active ROM (range of motion) encouraged   ROM (range of motion) performed     Problem: Adult Inpatient Plan of Care  Goal: Optimal Comfort and Wellbeing  Outcome: Ongoing, Progressing  Intervention: Monitor Pain and Promote Comfort  Recent Flowsheet Documentation  Taken 6/13/2024 0820 by Lidia Salguero RN  Pain Management Interventions:   pillow support provided   position adjusted   care clustered  Intervention: Provide Person-Centered Care  Recent Flowsheet Documentation  Taken 6/13/2024 0820 by Lidia Salguero RN  Trust Relationship/Rapport:   care explained   choices provided   questions answered   questions encouraged     Problem: Heart Failure Comorbidity  Goal: Maintenance of Heart Failure Symptom Control  Outcome: Ongoing, Progressing  Intervention: Maintain Heart Failure-Management  Recent Flowsheet Documentation  Taken 6/13/2024 1032 by Lidia Salguero RN  Medication Review/Management: medications reviewed  Taken 6/13/2024 0820 by Lidia Salguero RN  Medication Review/Management: medications reviewed     Problem: Obstructive Sleep Apnea Risk or Actual Comorbidity Management  Goal: Unobstructed Breathing During Sleep  Outcome: Ongoing, Progressing     Problem: Skin Injury Risk Increased  Goal: Skin Health and Integrity  Outcome: Ongoing, Progressing  Intervention: Optimize Skin Protection  Recent Flowsheet Documentation  Taken 6/13/2024 1032 by Lidia Salguero RN  Head of Bed (HOB) Positioning: HOB elevated  Taken 6/13/2024 0820 by Lidia Salguero RN  Pressure Reduction Techniques:   frequent weight shift encouraged   heels elevated off bed   weight shift assistance provided  Head of Bed (HOB) Positioning: HOB elevated  Pressure Reduction Devices:   alternating pressure pump (ADD)   positioning supports utilized  Skin Protection:   adhesive use limited   incontinence pads utilized   skin-to-device areas padded   tubing/devices free from skin contact     Problem: Fall Injury  Risk  Goal: Absence of Fall and Fall-Related Injury  Outcome: Ongoing, Progressing  Intervention: Identify and Manage Contributors  Recent Flowsheet Documentation  Taken 6/13/2024 1032 by Lidia Salguero RN  Medication Review/Management: medications reviewed  Taken 6/13/2024 0820 by Lidia Salguero RN  Medication Review/Management: medications reviewed  Self-Care Promotion: independence encouraged  Intervention: Promote Injury-Free Environment  Recent Flowsheet Documentation  Taken 6/13/2024 1032 by Lidia Salguero RN  Safety Promotion/Fall Prevention:   activity supervised   assistive device/personal items within reach   clutter free environment maintained   fall prevention program maintained   room organization consistent   safety round/check completed  Taken 6/13/2024 0820 by Lidia Salguero RN  Safety Promotion/Fall Prevention:   activity supervised   assistive device/personal items within reach   clutter free environment maintained   fall prevention program maintained   room organization consistent   safety round/check completed   Goal Outcome Evaluation:           Progress: improving  Outcome Evaluation: Pt has been much more alert compared to previous shift. Oriented x3 during entire shift today, very pleasant and cooperative. Wife continues to be extermely disruptive to his care and frequently argues with staff and declines treatment. Pt complains of no pain during shift. Wound care and dressing changes complete.

## 2024-06-13 NOTE — PROGRESS NOTES
"[facility] Clinical Pharmacy Services: Vancomycin Monitoring Note    Dilip Verma is a 74 y.o. male who is on day 1/7 of pharmacy to dose vancomycin for Skin and Soft Tissue.    Previous Vancomycin Dose:   LD 1750 mg IV on 6/12 at 2309  Updated Cultures and Sensitivities:     Results from last 7 days   Lab Units 06/13/24  1745   VANCOMYCIN RM mcg/mL 22.80     Vitals/Labs  Ht: 172.7 cm (68\"); Wt: 92.5 kg (204 lb)   Temp Readings from Last 1 Encounters:   06/13/24 98.6 °F (37 °C) (Axillary)     Estimated Creatinine Clearance: 13.4 mL/min (A) (by C-G formula based on SCr of 5.26 mg/dL (H)).   CKD + AMENA    Results from last 7 days   Lab Units 06/13/24  0532 06/12/24  0639 06/11/24  0729   CREATININE mg/dL 5.26* 4.55* 3.39*   WBC 10*3/mm3 9.76 13.69*  --      Assessment/Plan    Patient is clearing the drug very slowly and kidney function seems to be worsening  Will hold off on re-dosing until level is <20 (likely tomorrow AM)     Current Vancomycin Dose: no further doses needed tonight  Next Level Date and Time: Vanc Random on 6/14 at 0600 with AM labs  We will continue to monitor patient changes and renal function     Thank you for involving pharmacy in this patient's care. Please contact pharmacy with any questions or concerns.       Joelle Rg [facility]  Clinical Pharmacist          "

## 2024-06-13 NOTE — CASE MANAGEMENT/SOCIAL WORK
Continued Stay Note  [facility]     Patient Name: Dilip Verma  MRN: [id number]  Today's Date: 6/13/2024    Admit Date: 5/30/2024    Plan: Home with HH   Discharge Plan       Row Name 06/13/24 0954       Plan    Plan Home with     Patient/Family in Agreement with Plan yes    Plan Comments Spoke with pt wife at bedside, pt did awaken and told me he felt good today. Reviewed PT performance with wife and she is still insistent that pt will return home with her even though he is Max A x 2, asked pt wife if she had plan to have someone with her to help 24/7 and she states no, she states they have many medical equipment and she feels like she can handle his care adequately. She did ask for arm bar for rolling walker since he did well with that with therapy. Pt creatinine increasing per renal note; likely to get HD today/tomorrow, AMS somewhat better at times per nursing, pt is out of restraints. Palliative consult noted, pt wife seems unrealistic about patient decline and her ability to care for pt, offered further DME and she states she has all she needs. Pt will likely need EMS home, pt has ramp to enter and lives on first level of home. CCP will continue to follow - Nabila GONZALEZ                   Discharge Codes    No documentation.                 Expected Discharge Date and Time       Expected Discharge Date Expected Discharge Time    Jun 14, 2024               Nabila Ramsey RN

## 2024-06-13 NOTE — PROGRESS NOTES
"  Infectious Diseases Progress Note    Tino Nagel MD     [facility]  [place]: 14 days  Patient Identification:  Name: Dilip Verma  Age: 74 y.o.  Sex: male  :  1950  MRN: [id number]         Primary Care Physician: Fernando Camargo, DO        Subjective: Less confused follows commands.  Decreased pain and discomfort and swelling of the left arm.  Interval History: See consultation note.  2024 MRSA screen is negative.  Objective:    Scheduled Meds:amiodarone, 200 mg, Oral, Q24H  [Held by provider] apixaban, 5 mg, Oral, Q12H  aspirin, 81 mg, Oral, Daily  brimonidine, 1 drop, Both Eyes, BID  DULoxetine, 30 mg, Oral, Daily  enoxaparin, 1 mg/kg, Subcutaneous, BID  insulin lispro, 2-9 Units, Subcutaneous, 4x Daily AC & at Bedtime  latanoprost, 1 drop, Both Eyes, Nightly  Menthol-Zinc Oxide, 1 Application, Topical, Q12H  metoprolol tartrate, 12.5 mg, Oral, Q12H  OLANZapine, 5 mg, Oral, Nightly  senna-docusate sodium, 2 tablet, Oral, BID  sodium chloride, 10 mL, Intravenous, Q12H  terazosin, 1 mg, Oral, Nightly  thiamine, 100 mg, Intravenous, Daily  traMADol, 50 mg, Oral, Once  Vancomycin Pharmacy Intermittent/Pulse Dosing, , Does not apply, Daily      Continuous Infusions:Pharmacy to dose vancomycin,             Vital signs in last 24 hours:  Temp:  [97.5 °F (36.4 °C)-98.8 °F (37.1 °C)] 98.4 °F (36.9 °C)  Heart Rate:  [56-71] 71  Resp:  [18] 18  BP: ()/(54-68) 127/54    Intake/Output:    Intake/Output Summary (Last 24 hours) at 2024 0854  Last data filed at 2024 1848  Gross per 24 hour   Intake 360 ml   Output --   Net 360 ml           Exam:  /54 (BP Location: Right arm, Patient Position: Lying)   Pulse 71   Temp 98.4 °F (36.9 °C) (Oral)   Resp 18   Ht 172.7 cm (68\")   Wt 92.5 kg (204 lb)   SpO2 93%   BMI 31.02 kg/m²   Patient is examined using the personal protective equipment as per guidelines from infection control for this particular patient as enacted.  Hand " washing was performed before and after patient interaction.  General Appearance: Confused interactive and follows command.                          Head:    Normocephalic, without obvious abnormality, atraumatic                           Eyes:    PERRL, conjunctivae/corneas clear, EOM's intact, both eyes                         Throat:   Lips, tongue, gums normal; oral mucosa pink and moist                           Neck:   Supple, symmetrical, trachea midline, no JVD                         Lungs:    Clear to auscultation bilaterally, respirations unlabored                 Chest Wall:    No tenderness or deformity                          Heart:  S1-S2 regular                  Abdomen:   Soft nontender                 Extremities: Cellulitis of the left lower extremity is resolved.  Left upper extremity appears less erythematous.                  Neurologic: No acute distress       Data Review:    I reviewed the patient's new clinical results.  Results from last 7 days   Lab Units 06/13/24  0532 06/12/24  0639   WBC 10*3/mm3 9.76 13.69*   HEMOGLOBIN g/dL 11.1* 12.1*   PLATELETS 10*3/mm3 209 229     Results from last 7 days   Lab Units 06/13/24  0532 06/12/24  0639 06/11/24  0729 06/10/24  0701 06/09/24  0612 06/08/24  0718 06/07/24  0651   SODIUM mmol/L 131* 132* 133* 136 142 146* 140   POTASSIUM mmol/L 3.9 4.0 3.8 3.3* 3.1* 3.5 3.8   CHLORIDE mmol/L 97* 97* 99 102 105 110* 107   CO2 mmol/L 21.5* 22.9 25.0 23.1 25.0 23.4 21.3*   BUN mg/dL 49* 38* 31* 23 24* 24* 28*   CREATININE mg/dL 5.26* 4.55* 3.39* 2.34* 1.63* 1.54* 1.48*   CALCIUM mg/dL 8.3* 8.2* 8.0* 8.0* 8.2* 8.8 8.5*   GLUCOSE mg/dL 149* 168* 213* 155* 125* 76 119*     Microbiology Results (last 10 days)       Procedure Component Value - Date/Time    MRSA Screen, PCR (Inpatient) - Swab, Nares [[id number]]  (Normal) Collected: 06/04/24 0952    Lab Status: Final result Specimen: Swab from Nares Updated: 06/04/24 1112     MRSA PCR No MRSA Detected    Narrative:       The negative predictive value of this diagnostic test is high and should only be used to consider de-escalating anti-MRSA therapy. A positive result may indicate colonization with MRSA and must be correlated clinically.            Assessment:    Acute UTI (urinary tract infection)    Type 2 diabetes mellitus with hyperglycemia, with long-term current use of insulin    CAD (coronary artery disease)    Hx of CABG    Chronic diastolic heart failure    CSA (central sleep apnea)    HTN (hypertension)    History of stroke    CKD (chronic kidney disease) stage 3, GFR 30-59 ml/min    Peripheral arterial disease    Sepsis    Hyperkalemia    Metabolic encephalopathy    AMENA (acute kidney injury)    Paroxysmal atrial fibrillation  1-metabolic encephalopathy due to  2-systemic infection as a result of urinary tract infection as well as evolving sepsis traumatic cellulitis of the left leg.  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     Recommendations/Discussions:  Despite the fact that he is negative MRSA screen his right arm-continue with IV vancomycin for phlebitis and secondary cellulitis of the left arm as it is showing improvement.  Continue with left arm elevation  Continue supportive care per primary team.  Tino Nagel MD  6/13/2024  08:54 EDT    Parts of this note may be an electronic transcription/translation of spoken language to printed text using the Dragon dictation system.

## 2024-06-13 NOTE — PROGRESS NOTES
Name: Dilip Verma ADMIT: 2024   : 1950  PCP: Fernando Camargo DO    MRN: [id number] LOS: 14 days   AGE/SEX: 74 y.o. male  ROOM: [facility]     Subjective   Subjective   Patient is seen at bedside.       Objective   Objective   Vital Signs  Temp:  [97.5 °F (36.4 °C)-98.8 °F (37.1 °C)] 98.6 °F (37 °C)  Heart Rate:  [56-76] 76  Resp:  [18-20] 20  BP: ()/() 142/68  SpO2:  [91 %-96 %] 96 %  on  Flow (L/min):  [3.5] 3.5;   Device (Oxygen Therapy): room air  Body mass index is 31.02 kg/m².  Physical Exam  Vitals and nursing note reviewed.   Constitutional:       General: He is not in acute distress.     Appearance: He is ill-appearing (chronically). He is not toxic-appearing or diaphoretic.   HENT:      Head: Normocephalic and atraumatic.      Nose: Nose normal.      Mouth/Throat:      Mouth: Mucous membranes are moist.      Pharynx: Oropharynx is clear.   Eyes:      Conjunctiva/sclera: Conjunctivae normal.      Pupils: Pupils are equal, round, and reactive to light.   Cardiovascular:      Rate and Rhythm: Normal rate and regular rhythm.      Pulses: Normal pulses.   Pulmonary:      Effort: Pulmonary effort is normal.      Breath sounds: Normal breath sounds.   Abdominal:      General: Bowel sounds are normal.      Palpations: Abdomen is soft.      Tenderness: There is no abdominal tenderness.   Musculoskeletal:         General: Swelling (1-2+ BLE) present.      Cervical back: Neck supple.   Skin:     General: Skin is warm and dry.      Copied text material from yesterday's note has been reviewed for appropriate changes and remains accurate as of 24.      Results Review     I reviewed the patient's new clinical results.  Results from last 7 days   Lab Units 24  0532 24  0639   WBC 10*3/mm3 9.76 13.69*   HEMOGLOBIN g/dL 11.1* 12.1*   PLATELETS 10*3/mm3 209 229     Results from last 7 days   Lab Units 24  0532 24  0639 24  0729 06/10/24  0701   SODIUM mmol/L 131*  132* 133* 136   POTASSIUM mmol/L 3.9 4.0 3.8 3.3*   CHLORIDE mmol/L 97* 97* 99 102   CO2 mmol/L 21.5* 22.9 25.0 23.1   BUN mg/dL 49* 38* 31* 23   CREATININE mg/dL 5.26* 4.55* 3.39* 2.34*   GLUCOSE mg/dL 149* 168* 213* 155*   EGFR mL/min/1.73 10.7* 12.7* 18.1* 28.3*     Results from last 7 days   Lab Units 06/12/24  1739   ALBUMIN g/dL 2.7*     Results from last 7 days   Lab Units 06/13/24  0532 06/12/24  1739 06/12/24  0639 06/11/24  0729 06/10/24  0701   CALCIUM mg/dL 8.3*  --  8.2* 8.0* 8.0*   ALBUMIN g/dL  --  2.7*  --   --   --        Glucose   Date/Time Value Ref Range Status   06/13/2024 1625 206 (H) 70 - 130 mg/dL Final   06/13/2024 1132 183 (H) 70 - 130 mg/dL Final   06/13/2024 0547 145 (H) 70 - 130 mg/dL Final   06/12/2024 2105 189 (H) 70 - 130 mg/dL Final   06/12/2024 1649 246 (H) 70 - 130 mg/dL Final   06/12/2024 1141 167 (H) 70 - 130 mg/dL Final   06/12/2024 1017 223 (H) 70 - 130 mg/dL Final       No radiology results for the last day    I have personally reviewed all medications:  Scheduled Medications  amiodarone, 200 mg, Oral, Q24H  [Held by provider] apixaban, 5 mg, Oral, Q12H  aspirin, 81 mg, Oral, Daily  brimonidine, 1 drop, Both Eyes, BID  DULoxetine, 30 mg, Oral, Daily  enoxaparin, 1 mg/kg, Subcutaneous, BID  insulin lispro, 2-9 Units, Subcutaneous, 4x Daily AC & at Bedtime  latanoprost, 1 drop, Both Eyes, Nightly  Menthol-Zinc Oxide, 1 Application, Topical, Q12H  metoprolol tartrate, 12.5 mg, Oral, Q12H  OLANZapine, 5 mg, Oral, Nightly  senna-docusate sodium, 2 tablet, Oral, BID  sodium chloride, 10 mL, Intravenous, Q12H  terazosin, 1 mg, Oral, Nightly  thiamine, 100 mg, Intravenous, Daily  traMADol, 50 mg, Oral, Once  Vancomycin Pharmacy Intermittent/Pulse Dosing, , Does not apply, Daily    Infusions  Pharmacy to dose vancomycin,     Diet  Diet: Regular/House, Diabetic, Cardiac; Healthy Heart (2-3 Na+); Consistent Carbohydrate; Texture: Mechanical Ground (NDD 2); Fluid Consistency: Thin (IDDSI  0)    I have personally reviewed:  [x]  Laboratory   [x]  Microbiology   [x]  Radiology   [x]  EKG/Telemetry  [x]  Cardiology/Vascular   []  Pathology    []  Records       Assessment/Plan     Active Hospital Problems    Diagnosis  POA    **Acute UTI (urinary tract infection) [N39.0]  Yes    Paroxysmal atrial fibrillation [I48.0]  Yes    AMENA (acute kidney injury) [N17.9]  Yes    Sepsis [A41.9]  Yes    Hyperkalemia [E87.5]  Yes    Metabolic encephalopathy [G93.41]  Yes    Peripheral arterial disease [I73.9]  Yes    History of stroke [Z86.73]  Not Applicable    CKD (chronic kidney disease) stage 3, GFR 30-59 ml/min [N18.30]  Yes    HTN (hypertension) [I10]  Yes    CSA (central sleep apnea) [G47.31]  Yes    Chronic diastolic heart failure [I50.32]  Yes    Hx of CABG [Z95.1]  Not Applicable    CAD (coronary artery disease) [I25.10]  Yes    Type 2 diabetes mellitus with hyperglycemia, with long-term current use of insulin [E11.65, Z79.4]  Not Applicable      Resolved Hospital Problems   No resolved problems to display.       74 y.o. male admitted with Acute UTI (urinary tract infection).    Assessment and plan  Acute UTI/LLE Cellulitis  - urine culture grew Serratia  - completed 9d of abx-cefepime was discontinued 6/8 due to high suspicion of this causing toxic encephalopathy.  - doing well off of abx  - appreciate ID recs     HTNCAD/PAD/New Onset Afib (PAF)/Chronic Diastolic CHF  - BP acceptable, no anginal symptoms, in NSR  - continue on current regimen  - on AC with eliquis but not taking PO now due to severe encephalopathy-hold eliquis and ask pharmacy to dose lovenox  - appreciate cardiology recs     Type 2 DM  - had hypoglycemia, improved off of lantus  - continue D5W, getting thiamine also  - continue coverage with ssi/hypoglycemia protocol     AMENA  -Nephrology on board, follow management recommendations.     Hypoxia  - CXR noted, improved after additional dose of bumex  - monitor and encourage pulmonary toilet as  able     Delirium/Toxic Encephalopathy  - continue on scheduled and PRN zyprexa  - he is current with palliatus at home and takes PRN tramadol, this has been restarted     Hypokalemia  - replace K+       Tony Negrete MD  [facility]  06/13/24  18:09 EDT

## 2024-06-13 NOTE — NURSING NOTE
"Pt obtunded & only arousable to sternal rub, quickly falling back asleep & unable to stay alert long enough to follow commands or answer questions.   PM meds held.    Wife at bedside continuously trying to wake pt up. Attempting to force him to take a drink while laying down & sleeping/snoring. Overheard wife telling pt \"wake up and drink so you can get your tramadol tonight\".   Educated wife that narcotics will not be given to the pt in this current state and to refrain from continuously stimulating the pt if he appears asleep.   "

## 2024-06-13 NOTE — PROGRESS NOTES
PROGRESS NOTE      Patient Name: Dilip Verma  : 1950  MRN: [id number]  Primary Care Physician: Fernando Camargo DO  Date of admission: 2024    Patient Care Team:  Fernando Camargo DO as PCP - General (Family Medicine)  Morris Cai MD as Consulting Physician (Sleep Medicine)  Michaela Hylton MD as Consulting Physician (Cardiology)  Hardy Banerjee MD as Consulting Physician (Ophthalmology)  Chula Christianson MD as Consulting Physician (Ophthalmology)  Jason Sherman MD (Endocrinology)  Perla Mayen MD as Consulting Physician (Nephrology)  Federico eHbert MD as Consulting Physician (Pulmonary Disease)  Niraj Ravi MD as Consulting Physician (Orthopedic Surgery)  Papa Velasco MD as Consulting Physician (Urology)  Terese Yost MD as Consulting Physician (Gastroenterology)  Aracelis Ball MD as Consulting Physician (Colon and Rectal Surgery)  [facility] as Primary Care Provider        Reason for Follow up:     AMENA, CKD III      Subjective:     Feeling better, no distress  Renal function worse today with sCr 5.26       Review of Systems:         Constitutional: weakness.  HEENT:  No headache, otalgia, itchy eyes, nasal discharge or sore throat.  Cardiac:  No chest pain, dyspnea, orthopnea or PND.  Chest:  No cough, phlegm or wheezing.  Abdomen:  No abdominal pain, nausea or vomiting.  Neuro:  No focal weakness, abnormal movements or seizure-like activity.  :   No hematuria, no pyuria, no dysuria, no flank pain.  Extremities:  No  joint pains.  ROS was otherwise negative except as mentioned in the [place].    Social History:  reports that he quit smoking about 24 years ago. His smoking use included cigarettes. He started smoking about 40 years ago. He has a 12 pack-year smoking history. He has been exposed to tobacco smoke. He has never used smokeless tobacco. He reports that he does not currently use alcohol. He reports that he does not use  drugs.    Medications:  Prior to Admission medications    Medication Sig Start Date End Date Taking? Authorizing Provider   aspirin 81 MG EC tablet Take 1 tablet by mouth Every Night.   Yes Heri Rinaldi MD   brimonidine (ALPHAGAN) 0.2 % ophthalmic solution Administer 1 drop to both eyes 2 (Two) Times a Day.   Yes Heri Rinaldi MD   bumetanide (BUMEX) 1 MG tablet Take 1 tablet by mouth Daily.   Yes Heri Rinaldi MD   Cholecalciferol (Vitamin D-3) 125 MCG (5000 UT) tablet Take 1 tablet by mouth Daily.   Yes Heri Rinaldi MD   DULoxetine (CYMBALTA) 30 MG capsule Take 1 capsule by mouth Every Night. 1/16/24  Yes Heri Rinaldi MD   Insulin Glargine, 2 Unit Dial, (TOUJEO) 300 UNIT/ML solution pen-injector injection Inject 24 Units under the skin into the appropriate area as directed Daily.   Yes Heri Rinaldi MD   insulin lispro (humaLOG) 100 UNIT/ML injection Inject 0-14 Units under the skin into the appropriate area as directed 3 (Three) Times a Day Before Meals.  Patient taking differently: Inject 0-14 Units under the skin into the appropriate area as directed 3 (Three) Times a Day Before Meals. SLIDING SCALE  100-150 - 4 units  151-200 - 5 units  201-250 - 6 units  251-300 - 7 units  301-350 - 8 units  351-400 - 9 units  401+ - 10 units 12/7/20  Yes Amador Valverde MD   latanoprost (XALATAN) 0.005 % ophthalmic solution Administer 1 drop to both eyes every night at bedtime. 1/16/23  Yes Heri Rinaldi MD   multivitamin with minerals (MULTIVITAMIN ADULTS PO) Take 1 tablet by mouth Daily.   Yes Heri Rinaldi MD   testosterone (ANDROGEL) 25 MG/2.5GM (1%) gel gel Place 25 mg on the skin as directed by provider 2 (Two) Times a Week.   Yes Heri Rinaldi MD   traMADol (ULTRAM) 50 MG tablet Take 1 tablet by mouth At Night As Needed. 10/16/23  Yes Heri Rinaldi MD   Magnesium 400 MG capsule Take 400 mg by mouth As Needed.    Heri Rinaldi MD    sildenafil (REVATIO) 20 MG tablet Take 1 tablet by mouth As Needed.    Provider, MD Heri     Scheduled Meds:amiodarone, 200 mg, Oral, Q24H  [Held by provider] apixaban, 5 mg, Oral, Q12H  aspirin, 81 mg, Oral, Daily  brimonidine, 1 drop, Both Eyes, BID  DULoxetine, 30 mg, Oral, Daily  enoxaparin, 1 mg/kg, Subcutaneous, BID  insulin lispro, 2-9 Units, Subcutaneous, 4x Daily AC & at Bedtime  latanoprost, 1 drop, Both Eyes, Nightly  Menthol-Zinc Oxide, 1 Application, Topical, Q12H  metoprolol tartrate, 12.5 mg, Oral, Q12H  OLANZapine, 5 mg, Oral, Nightly  senna-docusate sodium, 2 tablet, Oral, BID  sodium chloride, 10 mL, Intravenous, Q12H  terazosin, 1 mg, Oral, Nightly  thiamine, 100 mg, Intravenous, Daily  traMADol, 50 mg, Oral, Once  Vancomycin Pharmacy Intermittent/Pulse Dosing, , Does not apply, Daily      Continuous Infusions:Pharmacy to dose vancomycin,           PRN Meds:  acetaminophen **OR** acetaminophen **OR** acetaminophen    senna-docusate sodium **AND** polyethylene glycol **AND** bisacodyl **AND** bisacodyl    dextrose    dextrose    glucagon (human recombinant)    nitroglycerin    OLANZapine    ondansetron    Pharmacy to dose vancomycin    sodium chloride    sodium chloride  Allergies:    Allergies   Allergen Reactions    Ace Inhibitors Angioedema    Angiotensin Receptor Blockers Angioedema and Unknown (See Comments)     Angioneurotic edema    Dapagliflozin Other (See Comments)     UTI,  rectal abcess    Losartan Angioedema     Angioneurotic edema    Seroquel [Quetiapine] Hallucinations    Xanax [Alprazolam] Unknown - High Severity    Amlodipine Swelling    Ativan [Lorazepam] Hallucinations    Baclofen Hallucinations    Misc. Sulfonamide Containing Compounds Unknown (See Comments)    Minoxidil Confusion    Tetracycline Rash     bliisters in mouth         Objective   Exam:     Vital Signs  Temp:  [97.5 °F (36.4 °C)-98.8 °F (37.1 °C)] 98.4 °F (36.9 °C)  Heart Rate:  [56-71] 71  Resp:  [18]  "18  BP: ()/(54-68) 127/54  SpO2:  [89 %-96 %] 93 %  on  Flow (L/min):  [3-3.5] 3.5;   Device (Oxygen Therapy): nasal cannula  Body mass index is 31.02 kg/m².       Exam:     /54 (BP Location: Right arm, Patient Position: Lying)   Pulse 71   Temp 98.4 °F (36.9 °C) (Oral)   Resp 18   Ht 172.7 cm (68\")   Wt 92.5 kg (204 lb)   SpO2 93%   BMI 31.02 kg/m²     General Appearance:    Appears chronically ill, no distress   Head:    Normocephalic, atraumatic   Eyes:     EOM's intact, sclerae anicteric        Ears:    TMs not observed   Nose:   Patent without discharge   Neck:   Supple, JVD   Lungs:     Clear to auscultation bilaterally, respiratory effort is normal   Chest wall:    No tenderness   Heart:    Regular rate and rhythm, S1 and S2 normal, no   rub    or gallop   Abdomen:     Soft, nontender, nondistended,  no masses, no organomegaly   Extremities:   No edema   Neurologic:  No focal deficits.  Speech is fluent.  Conversation is coherent.      Results Review:  I have personally reviewed most recent Data :  [facility]  CBC    Results from last 7 days   Lab Units 06/13/24  0532 06/12/24  0639   WBC 10*3/mm3 9.76 13.69*   HEMOGLOBIN g/dL 11.1* 12.1*   PLATELETS 10*3/mm3 209 229     CMP   Results from last 7 days   Lab Units 06/13/24  0532 06/12/24  1859 06/12/24  0639 06/11/24  0729 06/10/24  0701 06/09/24  0612 06/08/24  0718 06/07/24  0651   SODIUM mmol/L 131*  --  132* 133* 136 142 146* 140   POTASSIUM mmol/L 3.9  --  4.0 3.8 3.3* 3.1* 3.5 3.8   CHLORIDE mmol/L 97*  --  97* 99 102 105 110* 107   CO2 mmol/L 21.5*  --  22.9 25.0 23.1 25.0 23.4 21.3*   BUN mg/dL 49*  --  38* 31* 23 24* 24* 28*   CREATININE mg/dL 5.26*  --  4.55* 3.39* 2.34* 1.63* 1.54* 1.48*   GLUCOSE mg/dL 149*  --  168* 213* 155* 125* 76 119*   AMMONIA umol/L  --  31  --   --   --   --   --   --      ABG          XR Chest 1 View    Result Date: 6/11/2024  As described.    This report was finalized on 6/11/2024 " 2:46 PM by Dr. Gabe Zimmer M.D on Workstation: [id number]       Results for orders placed during the hospital encounter of 05/30/24    Adult Transthoracic Echo Complete W/ Cont if Necessary Per Protocol    Interpretation Summary    Left ventricular systolic function is hyperdynamic (EF > 70%). Calculated left ventricular EF = 75% Global longitudinal LV strain (GLS) = -17.8%. Left ventricle strain data was reviewed by the physician and found to be accurate. Global longitudinal LV strain is normal however there is regional abnormality with apical sparing suggestive of amyloid heart disease. However there is also basal ptal hypertrophy suggestive of hypertrophic cardiomyopathy. Wall motion abnormality is also noted in the basal septum and cannot rule out ischemic component.Consider additional imaging such as cardiac MRI and further evaluation also for amyloid heart disease as clinically indicated. The left ventricular cavity is small in size. Left ventricular wall thickness is consistent with moderate to severe septal asymmetric hypertrophy. There is hypokinesis of the left ventricular basal septum. Left ventricular diastolic function is consistent with (grade II w/high LAP) pseudonormalization.    The left atrial cavity is mildly dilated.    No aortic valve regurgitation or stenosis is present. The aortic valve is abnormal in structure. There is mild thickening of the aortic valve.    There is mild, bileaflet mitral valve thickening present. Trace mitral valve regurgitation is present. No significant mitral valve stenosis is present.    Moderate tricuspid valve regurgitation is present. Estimated right ventricular systolic pressure from tricuspid regurgitation is markedly elevated (>55 mmHg). Calculated right ventricular systolic pressure from tricuspid regurgitation is 74 mmHg.        Assessment & Plan   Assessment and Plan:         Acute UTI (urinary tract infection)    Type 2 diabetes mellitus with  hyperglycemia, with long-term current use of insulin    CAD (coronary artery disease)    Hx of CABG    Chronic diastolic heart failure    CSA (central sleep apnea)    HTN (hypertension)    History of stroke    CKD (chronic kidney disease) stage 3, GFR 30-59 ml/min    Peripheral arterial disease    Sepsis    Hyperkalemia    Metabolic encephalopathy    AMENA (acute kidney injury)    Paroxysmal atrial fibrillation    ASSESSMENT:  AMENA likely prerenal ATN 2/2 urosepsis with hemodynamic changes; on CKD III etiology likely diabetic and hypertensive nephropathy with baseline sCr ~0.9-1.2 mg/dL  Hyperkalemia, now resolved  UTI, urine cultures with gram neg bacilli  Leukocytosis  Metabolic encephalopathy  PAD  Hx stroke  HTN  CHF  CAD  DM2  Chronic splenic vein thrombosis  Possible hepatocellular disease/cirrhosis  Likely benign subpleural nodule with bilateral lymphadenopathy  Hiatal hernia    Last TTE 10/5/22 with EF 66%, grade II DD    PLAN :     AMENA likely prerenal ATN 2/2 urosepsis with hemodynamic changes; on CKD III etiology likely diabetic and hypertensive nephropathy with baseline sCr ~0.9-1.2 mg/dL  Creatinine much worse today at 5.26, BP with trends labile, some hypotension  Off IVF   BP is on low side, hydralazine was d/c'd  UOP on low side seem to be dense ATN pt sleepy but arouseable   D/W family about possiblity of dialysis in 1-2 days if not better   No urgent need for HD at this time   Potassium is acceptable no significant acidosis noted.  So no immediate need for renal replacement therapy  Off of Bumex 0.5 mg daily, will continue to hold  CXR 6/11 with pulmonary vasculature unremarkable, small mildly increased left basilar atelectasis or infiltrate  Followed by infectious disease, completed abx for urosepsis  Duplex venous US LUE with SVT  Cardiology following for new onset A-fib, evaluation noted  Avoid NSAIDs, nephrotoxic agents  We will follow and coordinate with team    Cristóbal Mayen MD   [facility]  6/13/2024  13:45 EDT      Patient was seen earlier by  APRN. I personally have examined the patient and interviewed the patient. I have reviewed the history, data, problems, assessment and plan with the nurse practitioner during rounds and I concur with the history, exam, assessment and plan as described in the progress note with comments additions, revisions as noted.           Cristóbal Mayen MD  6/13/2024  [facility]

## 2024-06-13 NOTE — PROGRESS NOTES
Name: Dilip Verma ADMIT: 2024   : 1950  PCP: Fernando Camargo DO    MRN: [id number] LOS: 13 days   AGE/SEX: 74 y.o. male  ROOM: [facility]     Subjective   Subjective   Patient is seen at bedside, no new complaints.       Objective   Objective   Vital Signs  Temp:  [98.1 °F (36.7 °C)-98.4 °F (36.9 °C)] 98.2 °F (36.8 °C)  Heart Rate:  [58-74] 63  Resp:  [16-18] 18  BP: ()/(56-67) 109/56  SpO2:  [89 %-94 %] 94 %  on  Flow (L/min):  [3-3.5] 3.5;   Device (Oxygen Therapy): nasal cannula  Body mass index is 31.02 kg/m².  Physical Exam  Vitals and nursing note reviewed.   Constitutional:       General: He is not in acute distress.     Appearance: He is ill-appearing (chronically). He is not toxic-appearing or diaphoretic.   HENT:      Head: Normocephalic and atraumatic.      Nose: Nose normal.      Mouth/Throat:      Mouth: Mucous membranes are moist.      Pharynx: Oropharynx is clear.   Eyes:      Conjunctiva/sclera: Conjunctivae normal.      Pupils: Pupils are equal, round, and reactive to light.   Cardiovascular:      Rate and Rhythm: Normal rate and regular rhythm.      Pulses: Normal pulses.   Pulmonary:      Effort: Pulmonary effort is normal.      Breath sounds: Normal breath sounds.   Abdominal:      General: Bowel sounds are normal.      Palpations: Abdomen is soft.      Tenderness: There is no abdominal tenderness.   Musculoskeletal:         General: Swelling (1-2+ BLE) present.      Cervical back: Neck supple.   Skin:     General: Skin is warm and dry.      Copied text material from yesterday's note has been reviewed for appropriate changes and remains accurate as of 24.      Results Review     I reviewed the patient's new clinical results.  Results from last 7 days   Lab Units 24  0639 24  0620   WBC 10*3/mm3 13.69* 10.27   HEMOGLOBIN g/dL 12.1* 12.9*   PLATELETS 10*3/mm3 229 261     Results from last 7 days   Lab Units 24  0639 24  0729 06/10/24  0701  06/09/24  0612   SODIUM mmol/L 132* 133* 136 142   POTASSIUM mmol/L 4.0 3.8 3.3* 3.1*   CHLORIDE mmol/L 97* 99 102 105   CO2 mmol/L 22.9 25.0 23.1 25.0   BUN mg/dL 38* 31* 23 24*   CREATININE mg/dL 4.55* 3.39* 2.34* 1.63*   GLUCOSE mg/dL 168* 213* 155* 125*   EGFR mL/min/1.73 12.7* 18.1* 28.3* 43.7*       Results from last 7 days   Lab Units 06/12/24  0639 06/11/24  0729 06/10/24  0701 06/09/24  0612   CALCIUM mg/dL 8.2* 8.0* 8.0* 8.2*       Glucose   Date/Time Value Ref Range Status   06/12/2024 1649 246 (H) 70 - 130 mg/dL Final   06/12/2024 1141 167 (H) 70 - 130 mg/dL Final   06/12/2024 1017 223 (H) 70 - 130 mg/dL Final   06/12/2024 0600 186 (H) 70 - 130 mg/dL Final   06/11/2024 2005 245 (H) 70 - 130 mg/dL Final   06/11/2024 1620 244 (H) 70 - 130 mg/dL Final   06/11/2024 1104 284 (H) 70 - 130 mg/dL Final       XR Chest 1 View    Result Date: 6/11/2024  As described.    This report was finalized on 6/11/2024 2:46 PM by Dr. Gabe Zimmer M.D on Workstation: [id number]       I have personally reviewed all medications:  Scheduled Medications  amiodarone, 200 mg, Oral, Q24H  [Held by provider] apixaban, 5 mg, Oral, Q12H  [START ON 6/13/2024] aspirin, 81 mg, Oral, Daily  brimonidine, 1 drop, Both Eyes, BID  DULoxetine, 30 mg, Oral, Daily  enoxaparin, 1 mg/kg, Subcutaneous, BID  insulin lispro, 2-9 Units, Subcutaneous, 4x Daily AC & at Bedtime  latanoprost, 1 drop, Both Eyes, Nightly  Menthol-Zinc Oxide, 1 Application, Topical, Q12H  metoprolol tartrate, 12.5 mg, Oral, Q12H  OLANZapine, 5 mg, Oral, Nightly  senna-docusate sodium, 2 tablet, Oral, BID  sodium chloride, 10 mL, Intravenous, Q12H  terazosin, 1 mg, Oral, Nightly  thiamine, 100 mg, Intravenous, Daily  traMADol, 50 mg, Oral, Once    Infusions   Diet  Diet: Regular/House, Diabetic, Cardiac; Healthy Heart (2-3 Na+); Consistent Carbohydrate; Texture: Mechanical Ground (NDD 2); Fluid Consistency: Thin (IDDSI 0)    I have personally reviewed:  [x]  Laboratory   [x]   Microbiology   [x]  Radiology   [x]  EKG/Telemetry  [x]  Cardiology/Vascular   []  Pathology    []  Records       Assessment/Plan     Active Hospital Problems    Diagnosis  POA    **Acute UTI (urinary tract infection) [N39.0]  Yes    Paroxysmal atrial fibrillation [I48.0]  Yes    AMENA (acute kidney injury) [N17.9]  Yes    Sepsis [A41.9]  Yes    Hyperkalemia [E87.5]  Yes    Metabolic encephalopathy [G93.41]  Yes    Peripheral arterial disease [I73.9]  Yes    History of stroke [Z86.73]  Not Applicable    CKD (chronic kidney disease) stage 3, GFR 30-59 ml/min [N18.30]  Yes    HTN (hypertension) [I10]  Yes    CSA (central sleep apnea) [G47.31]  Yes    Chronic diastolic heart failure [I50.32]  Yes    Hx of CABG [Z95.1]  Not Applicable    CAD (coronary artery disease) [I25.10]  Yes    Type 2 diabetes mellitus with hyperglycemia, with long-term current use of insulin [E11.65, Z79.4]  Not Applicable      Resolved Hospital Problems   No resolved problems to display.       74 y.o. male admitted with Acute UTI (urinary tract infection).    Assessment and plan  Acute UTI/LLE Cellulitis  - urine culture grew Serratia  - completed 9d of abx-cefepime was discontinued 6/8 due to high suspicion of this causing toxic encephalopathy.  - doing well off of abx  - appreciate ID recs     HTNCAD/PAD/New Onset Afib (PAF)/Chronic Diastolic CHF  - BP acceptable, no anginal symptoms, in NSR  - continue on current regimen  - on AC with eliquis but not taking PO now due to severe encephalopathy-hold eliquis and ask pharmacy to dose lovenox  - appreciate cardiology recs     Type 2 DM  - had hypoglycemia, improved off of lantus  - continue D5W, getting thiamine also  - continue coverage with ssi/hypoglycemia protocol     AMENA  - likely pre-renal with ATN  - worse today, bumex stopped and getting IV D5  - appreciate nephrology recs     Hypoxia  - CXR noted, improved after additional dose of bumex  - monitor and encourage pulmonary toilet as able      Delirium/Toxic Encephalopathy  - continue on scheduled and PRN zyprexa  - he is current with palliatus at home and takes PRN tramadol, this has been restarted     Hypokalemia  - replace K+          Tony Negrete MD  [facility]  06/12/24  20:31 EDT

## 2024-06-13 NOTE — NURSING NOTE
"During initial encounter with pt & wife, wife was already requesting tramadol for the pt from the RN & doctor. RN once again educated the wife that this medication is used as a narcotic treatment for pain, not prescribed for sleep and that it would not be ordered or given simply to put the patient to sleep. In addition, wife was educated that pt was too obtunded on previous shift as a result of oversedation and this was also a reason to not give pt any further medication to sedate him. At this time, patient is awake, alert, very pleasant with RN, and states he is having \"very little pain at all\". Wife is insistent and obsessive about giving pt tramadol and does not seem to understand the education repeatedly given to her. At this time, RN told wife there are other alternatives to helping pt sleep and it could be further discussed when needed.   "

## 2024-06-13 NOTE — PLAN OF CARE
"Goal Outcome Evaluation:     At beginning of shift pt obtunded & only arousal to sternal rub, unable to follow commands. This AM pt is alert and awake, still unable to follow simple commands   Alert to self. Visual & tactile hallucinations, \"seeing snakes in the bed\".  NSR on monitor, on 3L NC. Weak non productive cough   Hopkins for retention, minimal dark UOP  Q2 turn   LUE swollen & shirin   BLE swollen & shirin, LLE dx C/D/I   Wife remained at bedside all night, over stimulating pt and keeping him awake despite education to allow rest.                                           "

## 2024-06-13 NOTE — PROGRESS NOTES
"  Infectious Diseases Progress Note    Tino Nagel MD     [facility]  Los: 13 days  Patient Identification:  Name: Dilip Verma  Age: 74 y.o.  Sex: male  :  1950  MRN: [id number]         Primary Care Physician: Fernando Camargo, DO        Subjective: Not as restless but still confused.  Still concerned about the left arm swelling and redness.  Interval History: See consultation note.  2024 MRSA screen is negative.  Objective:    Scheduled Meds:amiodarone, 200 mg, Oral, Q24H  [Held by provider] apixaban, 5 mg, Oral, Q12H  [START ON 2024] aspirin, 81 mg, Oral, Daily  brimonidine, 1 drop, Both Eyes, BID  DULoxetine, 30 mg, Oral, Daily  enoxaparin, 1 mg/kg, Subcutaneous, BID  insulin lispro, 2-9 Units, Subcutaneous, 4x Daily AC & at Bedtime  latanoprost, 1 drop, Both Eyes, Nightly  Menthol-Zinc Oxide, 1 Application, Topical, Q12H  metoprolol tartrate, 12.5 mg, Oral, Q12H  OLANZapine, 5 mg, Oral, Nightly  senna-docusate sodium, 2 tablet, Oral, BID  sodium chloride, 10 mL, Intravenous, Q12H  terazosin, 1 mg, Oral, Nightly  thiamine, 100 mg, Intravenous, Daily  traMADol, 50 mg, Oral, Once      Continuous Infusions:         Vital signs in last 24 hours:  Temp:  [98.1 °F (36.7 °C)-98.4 °F (36.9 °C)] 98.2 °F (36.8 °C)  Heart Rate:  [58-74] 63  Resp:  [16-18] 18  BP: ()/(56-67) 109/56    Intake/Output:    Intake/Output Summary (Last 24 hours) at 2024  Last data filed at 2024 1848  Gross per 24 hour   Intake 360 ml   Output 500 ml   Net -140 ml           Exam:  /56   Pulse 63   Temp 98.2 °F (36.8 °C) (Oral)   Resp 18   Ht 172.7 cm (68\")   Wt 92.5 kg (204 lb)   SpO2 94%   BMI 31.02 kg/m²   Patient is examined using the personal protective equipment as per guidelines from infection control for this particular patient as enacted.  Hand washing was performed before and after patient interaction.  General Appearance: Confused                          Head:    " Normocephalic, without obvious abnormality, atraumatic                           Eyes:    PERRL, conjunctivae/corneas clear, EOM's intact, both eyes                         Throat:   Lips, tongue, gums normal; oral mucosa pink and moist                           Neck:   Supple, symmetrical, trachea midline, no JVD                         Lungs:    Clear to auscultation bilaterally, respirations unlabored                 Chest Wall:    No tenderness or deformity                          Heart:  S1-S2 regular                  Abdomen:   Soft nontender                 Extremities: Left lower extremity wrapping removed and cellulitis is resolved.  Superficial laceration is also healed.  Residual edema and effect of compression stocking with unequal pressure leading indentation of the skin.  Left upper extremity appears erythematous with scabbed area on the medial aspect of the arm and appears slightly erythematous.                  Neurologic: No acute distress       Data Review:    I reviewed the patient's new clinical results.  Results from last 7 days   Lab Units 06/12/24  0639 06/06/24  0620   WBC 10*3/mm3 13.69* 10.27   HEMOGLOBIN g/dL 12.1* 12.9*   PLATELETS 10*3/mm3 229 261     Results from last 7 days   Lab Units 06/12/24  0639 06/11/24  0729 06/10/24  0701 06/09/24  0612 06/08/24  0718 06/07/24  0651 06/06/24  1715   SODIUM mmol/L 132* 133* 136 142 146* 140 142   POTASSIUM mmol/L 4.0 3.8 3.3* 3.1* 3.5 3.8 3.9   CHLORIDE mmol/L 97* 99 102 105 110* 107 106   CO2 mmol/L 22.9 25.0 23.1 25.0 23.4 21.3* 20.3*   BUN mg/dL 38* 31* 23 24* 24* 28* 29*   CREATININE mg/dL 4.55* 3.39* 2.34* 1.63* 1.54* 1.48* 1.61*   CALCIUM mg/dL 8.2* 8.0* 8.0* 8.2* 8.8 8.5* 8.6   GLUCOSE mg/dL 168* 213* 155* 125* 76 119* 78     Microbiology Results (last 10 days)       Procedure Component Value - Date/Time    MRSA Screen, PCR (Inpatient) - Swab, Nares [[id number]]  (Normal) Collected: 06/04/24 0952    Lab Status: Final result Specimen: Swab  from Katie Updated: 06/04/24 1112     MRSA PCR No MRSA Detected    Narrative:      The negative predictive value of this diagnostic test is high and should only be used to consider de-escalating anti-MRSA therapy. A positive result may indicate colonization with MRSA and must be correlated clinically.            Assessment:    Acute UTI (urinary tract infection)    Type 2 diabetes mellitus with hyperglycemia, with long-term current use of insulin    CAD (coronary artery disease)    Hx of CABG    Chronic diastolic heart failure    CSA (central sleep apnea)    HTN (hypertension)    History of stroke    CKD (chronic kidney disease) stage 3, GFR 30-59 ml/min    Peripheral arterial disease    Sepsis    Hyperkalemia    Metabolic encephalopathy    AMENA (acute kidney injury)    Paroxysmal atrial fibrillation  1-metabolic encephalopathy due to  2-systemic infection as a result of urinary tract infection as well as evolving sepsis traumatic cellulitis of the left leg.  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     Recommendations/Discussions:  Start IV vancomycin for phlebitis and secondary cellulitis of the left arm.  Continue supportive care per primary team.  Tino Nagel MD  6/12/2024  20:46 EDT    Parts of this note may be an electronic transcription/translation of spoken language to printed text using the Dragon dictation system.

## 2024-06-14 ENCOUNTER — APPOINTMENT (OUTPATIENT)
Dept: GENERAL RADIOLOGY | Facility: HOSPITAL | Age: 74
End: 2024-06-14
Payer: MEDICARE

## 2024-06-14 LAB
ANION GAP SERPL CALCULATED.3IONS-SCNC: 13.8 MMOL/L (ref 5–15)
APTT PPP: 64.6 SECONDS (ref 22.7–35.4)
BASOPHILS # BLD AUTO: 0.02 10*3/MM3 (ref 0–0.2)
BASOPHILS NFR BLD AUTO: 0.2 % (ref 0–1.5)
BUN SERPL-MCNC: 54 MG/DL (ref 8–23)
BUN/CREAT SERPL: 9.3 (ref 7–25)
CALCIUM SPEC-SCNC: 8.5 MG/DL (ref 8.6–10.5)
CHLORIDE SERPL-SCNC: 98 MMOL/L (ref 98–107)
CO2 SERPL-SCNC: 20.2 MMOL/L (ref 22–29)
CREAT SERPL-MCNC: 5.83 MG/DL (ref 0.76–1.27)
DEPRECATED RDW RBC AUTO: 44 FL (ref 37–54)
EGFRCR SERPLBLD CKD-EPI 2021: 9.5 ML/MIN/1.73
EOSINOPHIL # BLD AUTO: 0.14 10*3/MM3 (ref 0–0.4)
EOSINOPHIL NFR BLD AUTO: 1.6 % (ref 0.3–6.2)
ERYTHROCYTE [DISTWIDTH] IN BLOOD BY AUTOMATED COUNT: 12.9 % (ref 12.3–15.4)
GLUCOSE BLDC GLUCOMTR-MCNC: 164 MG/DL (ref 70–130)
GLUCOSE BLDC GLUCOMTR-MCNC: 168 MG/DL (ref 70–130)
GLUCOSE BLDC GLUCOMTR-MCNC: 169 MG/DL (ref 70–130)
GLUCOSE BLDC GLUCOMTR-MCNC: 188 MG/DL (ref 70–130)
GLUCOSE SERPL-MCNC: 168 MG/DL (ref 65–99)
HCT VFR BLD AUTO: 33.4 % (ref 37.5–51)
HGB BLD-MCNC: 10.9 G/DL (ref 13–17.7)
IMM GRANULOCYTES # BLD AUTO: 0.05 10*3/MM3 (ref 0–0.05)
IMM GRANULOCYTES NFR BLD AUTO: 0.6 % (ref 0–0.5)
LYMPHOCYTES # BLD AUTO: 0.75 10*3/MM3 (ref 0.7–3.1)
LYMPHOCYTES NFR BLD AUTO: 8.5 % (ref 19.6–45.3)
MCH RBC QN AUTO: 31.1 PG (ref 26.6–33)
MCHC RBC AUTO-ENTMCNC: 32.6 G/DL (ref 31.5–35.7)
MCV RBC AUTO: 95.2 FL (ref 79–97)
MONOCYTES # BLD AUTO: 0.71 10*3/MM3 (ref 0.1–0.9)
MONOCYTES NFR BLD AUTO: 8.1 % (ref 5–12)
NEUTROPHILS NFR BLD AUTO: 7.13 10*3/MM3 (ref 1.7–7)
NEUTROPHILS NFR BLD AUTO: 81 % (ref 42.7–76)
NRBC BLD AUTO-RTO: 0 /100 WBC (ref 0–0.2)
PLATELET # BLD AUTO: 243 10*3/MM3 (ref 140–450)
PMV BLD AUTO: 10.6 FL (ref 6–12)
POTASSIUM SERPL-SCNC: 4.2 MMOL/L (ref 3.5–5.2)
RBC # BLD AUTO: 3.51 10*6/MM3 (ref 4.14–5.8)
SODIUM SERPL-SCNC: 132 MMOL/L (ref 136–145)
VANCOMYCIN SERPL-MCNC: 20.9 MCG/ML (ref 5–40)
WBC NRBC COR # BLD AUTO: 8.8 10*3/MM3 (ref 3.4–10.8)

## 2024-06-14 PROCEDURE — 80202 ASSAY OF VANCOMYCIN: CPT | Performed by: INTERNAL MEDICINE

## 2024-06-14 PROCEDURE — 85025 COMPLETE CBC W/AUTO DIFF WBC: CPT | Performed by: INTERNAL MEDICINE

## 2024-06-14 PROCEDURE — 25010000002 HEPARIN (PORCINE) 25000-0.45 UT/250ML-% SOLUTION: Performed by: INTERNAL MEDICINE

## 2024-06-14 PROCEDURE — 97530 THERAPEUTIC ACTIVITIES: CPT

## 2024-06-14 PROCEDURE — 80048 BASIC METABOLIC PNL TOTAL CA: CPT

## 2024-06-14 PROCEDURE — 85730 THROMBOPLASTIN TIME PARTIAL: CPT | Performed by: INTERNAL MEDICINE

## 2024-06-14 PROCEDURE — 63710000001 INSULIN LISPRO (HUMAN) PER 5 UNITS: Performed by: INTERNAL MEDICINE

## 2024-06-14 PROCEDURE — 82948 REAGENT STRIP/BLOOD GLUCOSE: CPT

## 2024-06-14 PROCEDURE — 71045 X-RAY EXAM CHEST 1 VIEW: CPT

## 2024-06-14 PROCEDURE — 25010000002 THIAMINE HCL 200 MG/2ML SOLUTION: Performed by: INTERNAL MEDICINE

## 2024-06-14 RX ORDER — MIDODRINE HYDROCHLORIDE 5 MG/1
10 TABLET ORAL
Status: DISCONTINUED | OUTPATIENT
Start: 2024-06-14 | End: 2024-06-19

## 2024-06-14 RX ADMIN — HEPARIN SODIUM 10.4 UNITS/KG/HR: 10000 INJECTION, SOLUTION INTRAVENOUS at 23:26

## 2024-06-14 RX ADMIN — DULOXETINE HYDROCHLORIDE 30 MG: 30 CAPSULE, DELAYED RELEASE ORAL at 23:30

## 2024-06-14 RX ADMIN — METOPROLOL TARTRATE 12.5 MG: 25 TABLET, FILM COATED ORAL at 08:33

## 2024-06-14 RX ADMIN — Medication 1 APPLICATION: at 21:34

## 2024-06-14 RX ADMIN — BRIMONIDINE TARTRATE 1 DROP: 2 SOLUTION OPHTHALMIC at 21:34

## 2024-06-14 RX ADMIN — INSULIN LISPRO 2 UNITS: 100 INJECTION, SOLUTION INTRAVENOUS; SUBCUTANEOUS at 12:19

## 2024-06-14 RX ADMIN — Medication 1 APPLICATION: at 08:33

## 2024-06-14 RX ADMIN — Medication 10 ML: at 08:32

## 2024-06-14 RX ADMIN — INSULIN LISPRO 2 UNITS: 100 INJECTION, SOLUTION INTRAVENOUS; SUBCUTANEOUS at 18:08

## 2024-06-14 RX ADMIN — TERAZOSIN HYDROCHLORIDE 1 MG: 1 CAPSULE ORAL at 23:31

## 2024-06-14 RX ADMIN — LATANOPROST 1 DROP: 50 SOLUTION OPHTHALMIC at 21:33

## 2024-06-14 RX ADMIN — BRIMONIDINE TARTRATE 1 DROP: 2 SOLUTION OPHTHALMIC at 08:34

## 2024-06-14 RX ADMIN — THIAMINE HYDROCHLORIDE 100 MG: 100 INJECTION, SOLUTION INTRAMUSCULAR; INTRAVENOUS at 08:33

## 2024-06-14 RX ADMIN — SENNOSIDES AND DOCUSATE SODIUM 2 TABLET: 50; 8.6 TABLET ORAL at 08:33

## 2024-06-14 RX ADMIN — Medication 10 ML: at 21:34

## 2024-06-14 RX ADMIN — AMIODARONE HYDROCHLORIDE 200 MG: 200 TABLET ORAL at 08:33

## 2024-06-14 RX ADMIN — ASPIRIN 81 MG: 81 TABLET, CHEWABLE ORAL at 08:32

## 2024-06-14 RX ADMIN — OLANZAPINE 5 MG: 5 TABLET, FILM COATED ORAL at 23:30

## 2024-06-14 NOTE — PLAN OF CARE
Goal Outcome Evaluation:  Plan of Care Reviewed With: patient        Progress: no change          Oriented to person and time, unable to state he is in hospital. Picks at air at times. F/C to bsd erick urine. Heparin drip infusing. Assisted to reposition. Wife at bedside.

## 2024-06-14 NOTE — SIGNIFICANT NOTE
06/14/24 1412   OTHER   Discipline occupational therapist   Rehab Time/Intention   Session Not Performed other (see comments)  (spoke with physical therapy following their session with patient, not approp to attempt in PM as pt very fatigued and unable to follow commands. F/u next service date for OT.)   Recommendation   OT - Next Appointment 06/17/24

## 2024-06-14 NOTE — PROGRESS NOTES
PROGRESS NOTE      Patient Name: Dilip Verma  : 1950  MRN: [id number]  Primary Care Physician: Fernando Camargo DO  Date of admission: 2024    Patient Care Team:  Fernando Camargo DO as PCP - General (Family Medicine)  Morris Cai MD as Consulting Physician (Sleep Medicine)  Michaela Hylton MD as Consulting Physician (Cardiology)  Hardy Banerjee MD as Consulting Physician (Ophthalmology)  Chula Christianson MD as Consulting Physician (Ophthalmology)  Jason Sherman MD (Endocrinology)  Perla Mayen MD as Consulting Physician (Nephrology)  Federico Hebert MD as Consulting Physician (Pulmonary Disease)  Niraj Ravi MD as Consulting Physician (Orthopedic Surgery)  Papa Velasco MD as Consulting Physician (Urology)  Terese Yost MD as Consulting Physician (Gastroenterology)  Aracelis Ball MD as Consulting Physician (Colon and Rectal Surgery)  [facility] as Primary Care Provider        Reason for Follow up:     AMENA, CKD III      Subjective:     Feeling better, no distress  Renal function worse today with sCr 5.8   ml       Review of Systems:         Constitutional: weakness.  HEENT:  No headache, otalgia, itchy eyes, nasal discharge or sore throat.  Cardiac:  No chest pain, dyspnea, orthopnea or PND.  Chest:  No cough, phlegm or wheezing.  Abdomen:  No abdominal pain, nausea or vomiting.  Neuro:  No focal weakness, abnormal movements or seizure-like activity.  :   No hematuria, no pyuria, no dysuria, no flank pain.  Extremities:  No  joint pains.  ROS was otherwise negative except as mentioned in the [place].    Social History:  reports that he quit smoking about 24 years ago. His smoking use included cigarettes. He started smoking about 40 years ago. He has a 12 pack-year smoking history. He has been exposed to tobacco smoke. He has never used smokeless tobacco. He reports that he does not currently use alcohol. He reports that he does  not use drugs.    Medications:  Prior to Admission medications    Medication Sig Start Date End Date Taking? Authorizing Provider   aspirin 81 MG EC tablet Take 1 tablet by mouth Every Night.   Yes Heri Rinaldi MD   brimonidine (ALPHAGAN) 0.2 % ophthalmic solution Administer 1 drop to both eyes 2 (Two) Times a Day.   Yes Heri Rinaldi MD   bumetanide (BUMEX) 1 MG tablet Take 1 tablet by mouth Daily.   Yes Heri Rinaldi MD   Cholecalciferol (Vitamin D-3) 125 MCG (5000 UT) tablet Take 1 tablet by mouth Daily.   Yes Heri Rinaldi MD   DULoxetine (CYMBALTA) 30 MG capsule Take 1 capsule by mouth Every Night. 1/16/24  Yes Hrei Rinaldi MD   Insulin Glargine, 2 Unit Dial, (TOUJEO) 300 UNIT/ML solution pen-injector injection Inject 24 Units under the skin into the appropriate area as directed Daily.   Yes Heri Rinaldi MD   insulin lispro (humaLOG) 100 UNIT/ML injection Inject 0-14 Units under the skin into the appropriate area as directed 3 (Three) Times a Day Before Meals.  Patient taking differently: Inject 0-14 Units under the skin into the appropriate area as directed 3 (Three) Times a Day Before Meals. SLIDING SCALE  100-150 - 4 units  151-200 - 5 units  201-250 - 6 units  251-300 - 7 units  301-350 - 8 units  351-400 - 9 units  401+ - 10 units 12/7/20  Yes Amador Valverde MD   latanoprost (XALATAN) 0.005 % ophthalmic solution Administer 1 drop to both eyes every night at bedtime. 1/16/23  Yes Heri Rinaldi MD   multivitamin with minerals (MULTIVITAMIN ADULTS PO) Take 1 tablet by mouth Daily.   Yes Heri Rinaldi MD   testosterone (ANDROGEL) 25 MG/2.5GM (1%) gel gel Place 25 mg on the skin as directed by provider 2 (Two) Times a Week.   Yes Heri Rinaldi MD   traMADol (ULTRAM) 50 MG tablet Take 1 tablet by mouth At Night As Needed. 10/16/23  Yes Heri Rinaldi MD   Magnesium 400 MG capsule Take 400 mg by mouth As Needed.    [place]  MD Heri   sildenafil (REVATIO) 20 MG tablet Take 1 tablet by mouth As Needed.    Provider, MD Heri     Scheduled Meds:amiodarone, 200 mg, Oral, Q24H  [Held by provider] apixaban, 5 mg, Oral, Q12H  aspirin, 81 mg, Oral, Daily  brimonidine, 1 drop, Both Eyes, BID  DULoxetine, 30 mg, Oral, Daily  insulin lispro, 2-9 Units, Subcutaneous, 4x Daily AC & at Bedtime  latanoprost, 1 drop, Both Eyes, Nightly  Menthol-Zinc Oxide, 1 Application, Topical, Q12H  metoprolol tartrate, 12.5 mg, Oral, Q12H  OLANZapine, 5 mg, Oral, Nightly  senna-docusate sodium, 2 tablet, Oral, BID  sodium chloride, 10 mL, Intravenous, Q12H  terazosin, 1 mg, Oral, Nightly  thiamine, 100 mg, Intravenous, Daily  traMADol, 50 mg, Oral, Once  Vancomycin Pharmacy Intermittent/Pulse Dosing, , Does not apply, Daily      Continuous Infusions:heparin, 10.4 Units/kg/hr, Last Rate: 10.4 Units/kg/hr (06/13/24 7374)  Pharmacy to dose vancomycin,           PRN Meds:  acetaminophen **OR** acetaminophen **OR** acetaminophen    senna-docusate sodium **AND** polyethylene glycol **AND** bisacodyl **AND** bisacodyl    dextrose    dextrose    glucagon (human recombinant)    heparin (porcine)    nitroglycerin    OLANZapine    ondansetron    Pharmacy to dose vancomycin    sodium chloride    sodium chloride  Allergies:    Allergies   Allergen Reactions    Ace Inhibitors Angioedema    Angiotensin Receptor Blockers Angioedema and Unknown (See Comments)     Angioneurotic edema    Dapagliflozin Other (See Comments)     UTI,  rectal abcess    Losartan Angioedema     Angioneurotic edema    Seroquel [Quetiapine] Hallucinations    Xanax [Alprazolam] Unknown - High Severity    Amlodipine Swelling    Ativan [Lorazepam] Hallucinations    Baclofen Hallucinations    Misc. Sulfonamide Containing Compounds Unknown (See Comments)    Minoxidil Confusion    Tetracycline Rash     bliisters in mouth         Objective   Exam:     Vital Signs  Temp:  [98.4 °F (36.9 °C)-98.6 °F (37  "°C)] 98.4 °F (36.9 °C)  Heart Rate:  [69-76] 75  Resp:  [20-21] 21  BP: (128-158)/() 158/78  SpO2:  [90 %-96 %] 95 %  on  Flow (L/min):  [3] 3;   Device (Oxygen Therapy): nasal cannula;humidified  Body mass index is 32.15 kg/m².       Exam:     /78 (BP Location: Right arm, Patient Position: Lying)   Pulse 75   Temp 98.4 °F (36.9 °C) (Oral)   Resp 21   Ht 172.7 cm (68\")   Wt 95.9 kg (211 lb 6.7 oz)   SpO2 95%   BMI 32.15 kg/m²     General Appearance:    Appears chronically ill, no distress   Head:    Normocephalic, atraumatic   Eyes:     EOM's intact, sclerae anicteric        Ears:    TMs not observed   Nose:   Patent without discharge   Neck:   Supple, JVD   Lungs:     Clear to auscultation bilaterally, respiratory effort is normal   Chest wall:    No tenderness   Heart:    Regular rate and rhythm, S1 and S2 normal, no   rub    or gallop   Abdomen:     Soft, nontender, nondistended,  no masses, no organomegaly   Extremities:   No edema   Neurologic:  No focal deficits.  Speech is fluent.  Conversation is coherent.      Results Review:  I have personally reviewed most recent Data :  BMP @LABRCNT(creatinine:10)  CBC    Results from last 7 days   Lab Units 06/14/24  0655 06/13/24  0532 06/12/24  0639   WBC 10*3/mm3 8.80 9.76 13.69*   HEMOGLOBIN g/dL 10.9* 11.1* 12.1*   PLATELETS 10*3/mm3 243 209 229     CMP   Results from last 7 days   Lab Units 06/14/24  0655 06/13/24  0532 06/12/24  1859 06/12/24  1739 06/12/24  0639 06/11/24  0729 06/10/24  0701 06/09/24  0612 06/08/24  0718   SODIUM mmol/L 132* 131*  --   --  132* 133* 136 142 146*   POTASSIUM mmol/L 4.2 3.9  --   --  4.0 3.8 3.3* 3.1* 3.5   CHLORIDE mmol/L 98 97*  --   --  97* 99 102 105 110*   CO2 mmol/L 20.2* 21.5*  --   --  22.9 25.0 23.1 25.0 23.4   BUN mg/dL 54* 49*  --   --  38* 31* 23 24* 24*   CREATININE mg/dL 5.83* 5.26*  --   --  4.55* 3.39* 2.34* 1.63* 1.54*   GLUCOSE mg/dL 168* 149*  --   --  168* 213* 155* 125* 76   ALBUMIN g/dL  " --   --   --  2.7*  --   --   --   --   --    AMMONIA umol/L  --   --  31  --   --   --   --   --   --      ABG          XR Chest 1 View    Result Date: 6/11/2024  As described.    This report was finalized on 6/11/2024 2:46 PM by Dr. Gabe Zimmer M.D on Workstation: [id number]       Results for orders placed during the hospital encounter of 05/30/24    Adult Transthoracic Echo Complete W/ Cont if Necessary Per Protocol    Interpretation Summary    Left ventricular systolic function is hyperdynamic (EF > 70%). Calculated left ventricular EF = 75% Global longitudinal LV strain (GLS) = -17.8%. Left ventricle strain data was reviewed by the physician and found to be accurate. Global longitudinal LV strain is normal however there is regional abnormality with apical sparing suggestive of amyloid heart disease. However there is also basal ptal hypertrophy suggestive of hypertrophic cardiomyopathy. Wall motion abnormality is also noted in the basal septum and cannot rule out ischemic component.Consider additional imaging such as cardiac MRI and further evaluation also for amyloid heart disease as clinically indicated. The left ventricular cavity is small in size. Left ventricular wall thickness is consistent with moderate to severe septal asymmetric hypertrophy. There is hypokinesis of the left ventricular basal septum. Left ventricular diastolic function is consistent with (grade II w/high LAP) pseudonormalization.    The left atrial cavity is mildly dilated.    No aortic valve regurgitation or stenosis is present. The aortic valve is abnormal in structure. There is mild thickening of the aortic valve.    There is mild, bileaflet mitral valve thickening present. Trace mitral valve regurgitation is present. No significant mitral valve stenosis is present.    Moderate tricuspid valve regurgitation is present. Estimated right ventricular systolic pressure from tricuspid regurgitation is markedly elevated (>55 mmHg).  Calculated right ventricular systolic pressure from tricuspid regurgitation is 74 mmHg.        Assessment & Plan   Assessment and Plan:         Acute UTI (urinary tract infection)    Type 2 diabetes mellitus with hyperglycemia, with long-term current use of insulin    CAD (coronary artery disease)    Hx of CABG    Chronic diastolic heart failure    CSA (central sleep apnea)    HTN (hypertension)    History of stroke    CKD (chronic kidney disease) stage 3, GFR 30-59 ml/min    Peripheral arterial disease    Sepsis    Hyperkalemia    Metabolic encephalopathy    AMENA (acute kidney injury)    Paroxysmal atrial fibrillation    ASSESSMENT:  AMENA likely prerenal ATN 2/2 urosepsis with hemodynamic changes; on CKD III etiology likely diabetic and hypertensive nephropathy with baseline sCr ~0.9-1.2 mg/dL  Hyperkalemia, now resolved  UTI, urine cultures with gram neg bacilli  Leukocytosis  Metabolic encephalopathy  PAD  Hx stroke  HTN  CHF  CAD  DM2  Chronic splenic vein thrombosis  Possible hepatocellular disease/cirrhosis  Likely benign subpleural nodule with bilateral lymphadenopathy  Hiatal hernia    Last TTE 10/5/22 with EF 66%, grade II DD    PLAN :     AMENA likely prerenal ATN 2/2 urosepsis with hemodynamic changes; on CKD III etiology likely diabetic and hypertensive nephropathy with baseline sCr ~0.9-1.2 mg/dL  Creatinine much worse today at 5.83 BP with trends labile, some hypotension  Will start midodrine 10 mg po tid  I suspect renal fx may continue to worsen and may require dialysis  Off IVF   BP is on low side, hydralazine was d/c'd  UOP on low side seem to be dense ATN pt sleepy but arouseable   D/W family about possiblity of dialysis in 1-2 days if not better   No urgent need for HD at this time   Potassium is acceptable no significant acidosis noted.  So no immediate need for renal replacement therapy  Off of Bumex 0.5 mg daily, will continue to hold  CXR 6/11 with pulmonary vasculature unremarkable, small mildly  increased left basilar atelectasis or infiltrate  Followed by infectious disease, completed abx for urosepsis  Duplex venous US LUE with SVT  Cardiology following for new onset A-fib, evaluation noted  Avoid NSAIDs, nephrotoxic agents  We will follow and coordinate with team    Cristóbal Mayen MD   [facility]  6/14/2024  13:06 EDT

## 2024-06-14 NOTE — PLAN OF CARE
Goal Outcome Evaluation:  Plan of Care Reviewed With: patient        Progress: no change  Outcome Evaluation: Pt seen for PT this PM - increased cues to stay awake today which impacted pt's ability to participate in activity. total A of 2 for bed mobility with posterior pushing noted sitting EOB. Did not progress to transfers as a result. pt continues to benefit from skilled PT to address moblity deficits. however pt with decreased activity tolerance and will maintain lower frequency until pt can tolerate increased activity. Recommend SNU at this time as pt requires assist of 2 for all mobility      Anticipated Discharge Disposition (PT): skilled nursing facility

## 2024-06-14 NOTE — DISCHARGE PLACEMENT REQUEST
"Dilip Reilly \"Ck\" (74 y.o. Male)       Date of Birth   1950    Social Security Number       Address   [address] [place] [address]    Home Phone   [phone]    MRN   [id number]       Latter-day   Non-Shinto    Marital Status                               Admission Date   5/30/24    Admission Type   Emergency    Admitting Provider   Jaquan Grossman MD    Attending Provider   Tony Negrete MD    Department, Room/Bed   [address], [address]       Discharge Date       Discharge Disposition       Discharge Destination                                 Attending Provider: Tony Negrete MD    Allergies: Ace Inhibitors, Angiotensin Receptor Blockers, Dapagliflozin, Losartan, Seroquel [Quetiapine], Xanax [Alprazolam], Amlodipine, Ativan [Lorazepam], Baclofen, Misc. Sulfonamide Containing Compounds, Minoxidil, Tetracycline    Isolation: None   Infection: None   Code Status: CPR    Ht: 172.7 cm (68\")   Wt: 95.9 kg (211 lb 6.7 oz)    Admission Cmt: None   Principal Problem: Acute UTI (urinary tract infection) [N39.0]                   Active Insurance as of 5/30/2024       Primary Coverage       Payor Plan Insurance Group Employer/Plan Group    [facility]PLAC[facility] [id number]       Payor Plan Address Payor Plan Phone Number Payor Plan Fax Number Effective Dates    [address] [address]   1/1/2023 - None Entered    [facility] [id number]         Subscriber Name Subscriber Birth Date Member ID       DILIP REILLY 1950 [id number]                     Emergency Contacts        (Rel.) Home Phone Work Phone Mobile Phone    Beny Reilly (Spouse) [phone] -- [phone]    SILVIA REILLY (Son) [phone] -- [phone]                "

## 2024-06-14 NOTE — PROGRESS NOTES
"  Infectious Diseases Progress Note    Tino Nagel MD     [facility]  [place]: 15 days  Patient Identification:  Name: Dilip Verma  Age: 74 y.o.  Sex: male  :  1950  MRN: [id number]         Primary Care Physician: Fernando Camargo, DO        Subjective: Interactive and denies any new complaints.  Off restraints.  More cooperative.  Interval History: See consultation note.  2024 MRSA screen is negative.  Objective:    Scheduled Meds:amiodarone, 200 mg, Oral, Q24H  [Held by provider] apixaban, 5 mg, Oral, Q12H  aspirin, 81 mg, Oral, Daily  brimonidine, 1 drop, Both Eyes, BID  DULoxetine, 30 mg, Oral, Daily  insulin lispro, 2-9 Units, Subcutaneous, 4x Daily AC & at Bedtime  latanoprost, 1 drop, Both Eyes, Nightly  Menthol-Zinc Oxide, 1 Application, Topical, Q12H  metoprolol tartrate, 12.5 mg, Oral, Q12H  OLANZapine, 5 mg, Oral, Nightly  senna-docusate sodium, 2 tablet, Oral, BID  sodium chloride, 10 mL, Intravenous, Q12H  terazosin, 1 mg, Oral, Nightly  thiamine, 100 mg, Intravenous, Daily  traMADol, 50 mg, Oral, Once  Vancomycin Pharmacy Intermittent/Pulse Dosing, , Does not apply, Daily      Continuous Infusions:heparin, 10.4 Units/kg/hr, Last Rate: 10.4 Units/kg/hr (242)  Pharmacy to dose vancomycin,             Vital signs in last 24 hours:  Temp:  [98.4 °F (36.9 °C)-98.6 °F (37 °C)] 98.4 °F (36.9 °C)  Heart Rate:  [69-76] 75  Resp:  [20-21] 21  BP: (128-158)/() 158/78    Intake/Output:    Intake/Output Summary (Last 24 hours) at 2024 0754  Last data filed at 2024 0522  Gross per 24 hour   Intake 180 ml   Output 400 ml   Net -220 ml           Exam:  /78 (BP Location: Right arm, Patient Position: Lying)   Pulse 75   Temp 98.4 °F (36.9 °C) (Oral)   Resp 21   Ht 172.7 cm (68\")   Wt 95.9 kg (211 lb 6.7 oz)   SpO2 95%   BMI 32.15 kg/m²   Patient is examined using the personal protective equipment as per guidelines from infection control for this particular " patient as enacted.  Hand washing was performed before and after patient interaction.  General Appearance: Confused interactive and follows command.                          Head:    Normocephalic, without obvious abnormality, atraumatic                           Eyes:    PERRL, conjunctivae/corneas clear, EOM's intact, both eyes                         Throat:   Lips, tongue, gums normal; oral mucosa pink and moist                           Neck:   Supple, symmetrical, trachea midline, no JVD                         Lungs:    Clear to auscultation bilaterally, respirations unlabored                 Chest Wall:    No tenderness or deformity                          Heart:  S1-S2 regular                  Abdomen:   Soft nontender                 Extremities: Cellulitis of the left lower extremity is resolved.  Left upper extremity appears less erythematous.                  Neurologic: No acute distress       Data Review:    I reviewed the patient's new clinical results.  Results from last 7 days   Lab Units 06/14/24  0655 06/13/24  0532 06/12/24  0639   WBC 10*3/mm3 8.80 9.76 13.69*   HEMOGLOBIN g/dL 10.9* 11.1* 12.1*   PLATELETS 10*3/mm3 243 209 229     Results from last 7 days   Lab Units 06/14/24  0655 06/13/24  0532 06/12/24  0639 06/11/24  0729 06/10/24  0701 06/09/24  0612 06/08/24  0718   SODIUM mmol/L 132* 131* 132* 133* 136 142 146*   POTASSIUM mmol/L 4.2 3.9 4.0 3.8 3.3* 3.1* 3.5   CHLORIDE mmol/L 98 97* 97* 99 102 105 110*   CO2 mmol/L 20.2* 21.5* 22.9 25.0 23.1 25.0 23.4   BUN mg/dL 54* 49* 38* 31* 23 24* 24*   CREATININE mg/dL 5.83* 5.26* 4.55* 3.39* 2.34* 1.63* 1.54*   CALCIUM mg/dL 8.5* 8.3* 8.2* 8.0* 8.0* 8.2* 8.8   GLUCOSE mg/dL 168* 149* 168* 213* 155* 125* 76     Microbiology Results (last 10 days)       Procedure Component Value - Date/Time    MRSA Screen, PCR (Inpatient) - Swab, Nares [[id number]]  (Normal) Collected: 06/04/24 0952    Lab Status: Final result Specimen: Swab from Nares Updated:  06/04/24 1112     MRSA PCR No MRSA Detected    Narrative:      The negative predictive value of this diagnostic test is high and should only be used to consider de-escalating anti-MRSA therapy. A positive result may indicate colonization with MRSA and must be correlated clinically.            Assessment:    Acute UTI (urinary tract infection)    Type 2 diabetes mellitus with hyperglycemia, with long-term current use of insulin    CAD (coronary artery disease)    Hx of CABG    Chronic diastolic heart failure    CSA (central sleep apnea)    HTN (hypertension)    History of stroke    CKD (chronic kidney disease) stage 3, GFR 30-59 ml/min    Peripheral arterial disease    Sepsis    Hyperkalemia    Metabolic encephalopathy    AMENA (acute kidney injury)    Paroxysmal atrial fibrillation  1-metabolic encephalopathy due to  2-systemic infection as a result of urinary tract infection as well as evolving sepsis traumatic cellulitis of the left leg.  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     Recommendations/Discussions:  Despite the fact that he is negative MRSA screen his right arm-continue with IV vancomycin for phlebitis and secondary cellulitis of the left arm as it is showing improvement.  Continue antibiotic treatment for 7 days with continued local care elevation and monitoring and local care of the scabbed wound on the medial aspect of the midforearm.  Continue with left arm elevation  Continue supportive care per primary team.  Tino Nagel MD  6/14/2024  07:54 EDT    Parts of this note may be an electronic  transcription/translation of spoken language to printed text using the Dragon dictation system.

## 2024-06-14 NOTE — PROGRESS NOTES
"[facility] Clinical Pharmacy Services: Vancomycin Monitoring Note    Dilip Verma is a 74 y.o. male who is on day 2/7 of pharmacy to dose vancomycin for Skin and Soft Tissue.    Previous Vancomycin Dose:   LD 1750 mg IV on 6/12 at 2309  Updated Cultures and Sensitivities:     Results from last 7 days   Lab Units 06/14/24  0655 06/13/24  1745   VANCOMYCIN RM mcg/mL 20.90 22.80     Vitals/Labs  Ht: 172.7 cm (68\"); Wt: 95.9 kg (211 lb 6.7 oz)   Temp Readings from Last 1 Encounters:   06/14/24 98.4 °F (36.9 °C) (Oral)     Estimated Creatinine Clearance: 12.3 mL/min (A) (by C-G formula based on SCr of 5.83 mg/dL (H)).   CKD + AMENA    Results from last 7 days   Lab Units 06/14/24  0655 06/13/24  0532 06/12/24  0639   CREATININE mg/dL 5.83* 5.26* 4.55*   WBC 10*3/mm3 8.80 9.76 13.69*     Assessment/Plan    Patient is clearing the drug very slowly and kidney function seems to be worsening. Level is on high end of therapeutic range. Will hold off on giving another dose today to avoid accumulation with worsening renal functions. Vanc should still be in therapeutic range of 10-20 mcg/mL tomorrow AM. Will obtain random level with AM labs tomorrow and re-dose as appropriate.     Current Vancomycin Dose: no further doses needed tonight  Next Level Date and Time: Vanc Random on 6/15 at 0600 with AM labs  We will continue to monitor patient changes and renal function     Thank you for involving pharmacy in this patient's care. Please contact pharmacy with any questions or concerns.       Naun Baker [facility]  Clinical Pharmacist            "

## 2024-06-15 ENCOUNTER — APPOINTMENT (OUTPATIENT)
Dept: GENERAL RADIOLOGY | Facility: HOSPITAL | Age: 74
End: 2024-06-15
Payer: MEDICARE

## 2024-06-15 LAB
ANION GAP SERPL CALCULATED.3IONS-SCNC: 15.1 MMOL/L (ref 5–15)
APTT PPP: 48.8 SECONDS (ref 22.7–35.4)
APTT PPP: 57.5 SECONDS (ref 22.7–35.4)
APTT PPP: 77.8 SECONDS (ref 22.7–35.4)
ARTERIAL PATENCY WRIST A: POSITIVE
ATMOSPHERIC PRESS: 748.6 MMHG
BASE EXCESS BLDA CALC-SCNC: -3 MMOL/L (ref 0–2)
BASOPHILS # BLD AUTO: 0.02 10*3/MM3 (ref 0–0.2)
BASOPHILS NFR BLD AUTO: 0.3 % (ref 0–1.5)
BDY SITE: ABNORMAL
BUN SERPL-MCNC: 66 MG/DL (ref 8–23)
BUN/CREAT SERPL: 9.5 (ref 7–25)
CALCIUM SPEC-SCNC: 8.2 MG/DL (ref 8.6–10.5)
CHLORIDE SERPL-SCNC: 100 MMOL/L (ref 98–107)
CO2 BLDA-SCNC: 25.2 MMOL/L (ref 23–27)
CO2 SERPL-SCNC: 20.9 MMOL/L (ref 22–29)
CREAT SERPL-MCNC: 6.93 MG/DL (ref 0.76–1.27)
D-LACTATE SERPL-SCNC: 0.8 MMOL/L (ref 0.5–2)
DEPRECATED RDW RBC AUTO: 43.2 FL (ref 37–54)
DEVICE COMMENT: ABNORMAL
EGFRCR SERPLBLD CKD-EPI 2021: 7.7 ML/MIN/1.73
EOSINOPHIL # BLD AUTO: 0.2 10*3/MM3 (ref 0–0.4)
EOSINOPHIL NFR BLD AUTO: 2.7 % (ref 0.3–6.2)
ERYTHROCYTE [DISTWIDTH] IN BLOOD BY AUTOMATED COUNT: 12.8 % (ref 12.3–15.4)
GAS FLOW AIRWAY: 2 LPM
GLUCOSE BLDC GLUCOMTR-MCNC: 119 MG/DL (ref 70–130)
GLUCOSE BLDC GLUCOMTR-MCNC: 156 MG/DL (ref 70–130)
GLUCOSE BLDC GLUCOMTR-MCNC: 178 MG/DL (ref 70–130)
GLUCOSE BLDC GLUCOMTR-MCNC: 187 MG/DL (ref 70–130)
GLUCOSE BLDC GLUCOMTR-MCNC: 208 MG/DL (ref 70–130)
GLUCOSE SERPL-MCNC: 143 MG/DL (ref 65–99)
HAV IGM SERPL QL IA: NORMAL
HBV CORE IGM SERPL QL IA: NORMAL
HBV SURFACE AG SERPL QL IA: NORMAL
HCO3 BLDA-SCNC: 23.7 MMOL/L (ref 22–28)
HCT VFR BLD AUTO: 32 % (ref 37.5–51)
HCV AB SER QL: NORMAL
HEMODILUTION: NO
HGB BLD-MCNC: 10.7 G/DL (ref 13–17.7)
IMM GRANULOCYTES # BLD AUTO: 0.03 10*3/MM3 (ref 0–0.05)
IMM GRANULOCYTES NFR BLD AUTO: 0.4 % (ref 0–0.5)
LYMPHOCYTES # BLD AUTO: 0.94 10*3/MM3 (ref 0.7–3.1)
LYMPHOCYTES NFR BLD AUTO: 12.5 % (ref 19.6–45.3)
Lab: ABNORMAL
MCH RBC QN AUTO: 31.1 PG (ref 26.6–33)
MCHC RBC AUTO-ENTMCNC: 33.4 G/DL (ref 31.5–35.7)
MCV RBC AUTO: 93 FL (ref 79–97)
MODALITY: ABNORMAL
MONOCYTES # BLD AUTO: 0.72 10*3/MM3 (ref 0.1–0.9)
MONOCYTES NFR BLD AUTO: 9.6 % (ref 5–12)
NEUTROPHILS NFR BLD AUTO: 5.61 10*3/MM3 (ref 1.7–7)
NEUTROPHILS NFR BLD AUTO: 74.5 % (ref 42.7–76)
NOTIFIED WHO: ABNORMAL
NRBC BLD AUTO-RTO: 0 /100 WBC (ref 0–0.2)
PCO2 BLDA: 48.6 MM HG (ref 35–45)
PH BLDA: 7.3 PH UNITS (ref 7.35–7.45)
PLATELET # BLD AUTO: 268 10*3/MM3 (ref 140–450)
PMV BLD AUTO: 10.6 FL (ref 6–12)
PO2 BLDA: 62.1 MM HG (ref 80–100)
POTASSIUM SERPL-SCNC: 4.1 MMOL/L (ref 3.5–5.2)
PROCALCITONIN SERPL-MCNC: 0.46 NG/ML (ref 0–0.25)
RBC # BLD AUTO: 3.44 10*6/MM3 (ref 4.14–5.8)
READ BACK: YES
SAO2 % BLDCOA: 88.5 % (ref 92–98.5)
SET MECH RESP RATE: 18
SODIUM SERPL-SCNC: 136 MMOL/L (ref 136–145)
VANCOMYCIN SERPL-MCNC: 19.8 MCG/ML (ref 5–40)
WBC NRBC COR # BLD AUTO: 7.52 10*3/MM3 (ref 3.4–10.8)

## 2024-06-15 PROCEDURE — 85730 THROMBOPLASTIN TIME PARTIAL: CPT | Performed by: INTERNAL MEDICINE

## 2024-06-15 PROCEDURE — 25010000002 THIAMINE HCL 200 MG/2ML SOLUTION: Performed by: INTERNAL MEDICINE

## 2024-06-15 PROCEDURE — 94799 UNLISTED PULMONARY SVC/PX: CPT

## 2024-06-15 PROCEDURE — 85025 COMPLETE CBC W/AUTO DIFF WBC: CPT | Performed by: INTERNAL MEDICINE

## 2024-06-15 PROCEDURE — 83605 ASSAY OF LACTIC ACID: CPT | Performed by: INTERNAL MEDICINE

## 2024-06-15 PROCEDURE — 94664 DEMO&/EVAL PT USE INHALER: CPT

## 2024-06-15 PROCEDURE — 36600 WITHDRAWAL OF ARTERIAL BLOOD: CPT | Performed by: INTERNAL MEDICINE

## 2024-06-15 PROCEDURE — 25010000002 HEPARIN (PORCINE) PER 1000 UNITS: Performed by: INTERNAL MEDICINE

## 2024-06-15 PROCEDURE — 36556 INSERT NON-TUNNEL CV CATH: CPT | Performed by: SURGERY

## 2024-06-15 PROCEDURE — 02HV33Z INSERTION OF INFUSION DEVICE INTO SUPERIOR VENA CAVA, PERCUTANEOUS APPROACH: ICD-10-PCS | Performed by: SURGERY

## 2024-06-15 PROCEDURE — 77001 FLUOROGUIDE FOR VEIN DEVICE: CPT | Performed by: SURGERY

## 2024-06-15 PROCEDURE — 5A1D70Z PERFORMANCE OF URINARY FILTRATION, INTERMITTENT, LESS THAN 6 HOURS PER DAY: ICD-10-PCS | Performed by: STUDENT IN AN ORGANIZED HEALTH CARE EDUCATION/TRAINING PROGRAM

## 2024-06-15 PROCEDURE — 77001 FLUOROGUIDE FOR VEIN DEVICE: CPT

## 2024-06-15 PROCEDURE — 94761 N-INVAS EAR/PLS OXIMETRY MLT: CPT

## 2024-06-15 PROCEDURE — 94760 N-INVAS EAR/PLS OXIMETRY 1: CPT

## 2024-06-15 PROCEDURE — 80048 BASIC METABOLIC PNL TOTAL CA: CPT

## 2024-06-15 PROCEDURE — 99222 1ST HOSP IP/OBS MODERATE 55: CPT | Performed by: SURGERY

## 2024-06-15 PROCEDURE — 76937 US GUIDE VASCULAR ACCESS: CPT

## 2024-06-15 PROCEDURE — 80074 ACUTE HEPATITIS PANEL: CPT | Performed by: INTERNAL MEDICINE

## 2024-06-15 PROCEDURE — 84145 PROCALCITONIN (PCT): CPT | Performed by: INTERNAL MEDICINE

## 2024-06-15 PROCEDURE — 82803 BLOOD GASES ANY COMBINATION: CPT | Performed by: INTERNAL MEDICINE

## 2024-06-15 PROCEDURE — 94640 AIRWAY INHALATION TREATMENT: CPT

## 2024-06-15 PROCEDURE — 82948 REAGENT STRIP/BLOOD GLUCOSE: CPT

## 2024-06-15 PROCEDURE — 25010000002 LIDOCAINE 1 % SOLUTION: Performed by: INTERNAL MEDICINE

## 2024-06-15 PROCEDURE — 80202 ASSAY OF VANCOMYCIN: CPT | Performed by: INTERNAL MEDICINE

## 2024-06-15 RX ORDER — HEPARIN SODIUM 1000 [USP'U]/ML
4000 INJECTION, SOLUTION INTRAVENOUS; SUBCUTANEOUS ONCE
Status: COMPLETED | OUTPATIENT
Start: 2024-06-15 | End: 2024-06-15

## 2024-06-15 RX ORDER — SODIUM CHLORIDE FOR INHALATION 7 %
4 VIAL, NEBULIZER (ML) INHALATION
Status: DISCONTINUED | OUTPATIENT
Start: 2024-06-15 | End: 2024-06-25 | Stop reason: HOSPADM

## 2024-06-15 RX ORDER — LIDOCAINE HYDROCHLORIDE 10 MG/ML
30 INJECTION, SOLUTION INFILTRATION; PERINEURAL ONCE
Status: COMPLETED | OUTPATIENT
Start: 2024-06-15 | End: 2024-06-15

## 2024-06-15 RX ORDER — GUAIFENESIN 600 MG/1
600 TABLET, EXTENDED RELEASE ORAL EVERY 12 HOURS SCHEDULED
Status: DISCONTINUED | OUTPATIENT
Start: 2024-06-15 | End: 2024-06-25 | Stop reason: HOSPADM

## 2024-06-15 RX ORDER — HEPARIN SODIUM 1000 [USP'U]/ML
10000 INJECTION, SOLUTION INTRAVENOUS; SUBCUTANEOUS ONCE
Status: COMPLETED | OUTPATIENT
Start: 2024-06-15 | End: 2024-06-15

## 2024-06-15 RX ORDER — IPRATROPIUM BROMIDE AND ALBUTEROL SULFATE 2.5; .5 MG/3ML; MG/3ML
3 SOLUTION RESPIRATORY (INHALATION)
Status: DISCONTINUED | OUTPATIENT
Start: 2024-06-15 | End: 2024-06-25 | Stop reason: HOSPADM

## 2024-06-15 RX ADMIN — Medication 4 ML: at 16:20

## 2024-06-15 RX ADMIN — MIDODRINE HYDROCHLORIDE 10 MG: 5 TABLET ORAL at 09:52

## 2024-06-15 RX ADMIN — OLANZAPINE 5 MG: 5 TABLET, FILM COATED ORAL at 20:03

## 2024-06-15 RX ADMIN — IPRATROPIUM BROMIDE AND ALBUTEROL SULFATE 3 ML: .5; 3 SOLUTION RESPIRATORY (INHALATION) at 16:12

## 2024-06-15 RX ADMIN — AMIODARONE HYDROCHLORIDE 200 MG: 200 TABLET ORAL at 09:52

## 2024-06-15 RX ADMIN — Medication 4 ML: at 20:06

## 2024-06-15 RX ADMIN — ASPIRIN 81 MG: 81 TABLET, CHEWABLE ORAL at 09:52

## 2024-06-15 RX ADMIN — Medication 10 ML: at 09:52

## 2024-06-15 RX ADMIN — GUAIFENESIN 600 MG: 600 TABLET, EXTENDED RELEASE ORAL at 20:03

## 2024-06-15 RX ADMIN — METOPROLOL TARTRATE 12.5 MG: 25 TABLET, FILM COATED ORAL at 09:52

## 2024-06-15 RX ADMIN — Medication 1 APPLICATION: at 09:52

## 2024-06-15 RX ADMIN — THIAMINE HYDROCHLORIDE 100 MG: 100 INJECTION, SOLUTION INTRAMUSCULAR; INTRAVENOUS at 09:52

## 2024-06-15 RX ADMIN — IPRATROPIUM BROMIDE AND ALBUTEROL SULFATE 3 ML: .5; 3 SOLUTION RESPIRATORY (INHALATION) at 20:06

## 2024-06-15 RX ADMIN — SENNOSIDES AND DOCUSATE SODIUM 2 TABLET: 50; 8.6 TABLET ORAL at 09:52

## 2024-06-15 RX ADMIN — Medication 1 APPLICATION: at 20:03

## 2024-06-15 RX ADMIN — MIDODRINE HYDROCHLORIDE 10 MG: 5 TABLET ORAL at 17:17

## 2024-06-15 RX ADMIN — BRIMONIDINE TARTRATE 1 DROP: 2 SOLUTION OPHTHALMIC at 09:51

## 2024-06-15 RX ADMIN — METOPROLOL TARTRATE 12.5 MG: 25 TABLET, FILM COATED ORAL at 20:03

## 2024-06-15 RX ADMIN — LATANOPROST 1 DROP: 50 SOLUTION OPHTHALMIC at 20:03

## 2024-06-15 RX ADMIN — HEPARIN SODIUM 2600 UNITS: 1000 INJECTION, SOLUTION INTRAVENOUS; SUBCUTANEOUS at 15:54

## 2024-06-15 RX ADMIN — LIDOCAINE HYDROCHLORIDE 20 ML: 10 INJECTION, SOLUTION INFILTRATION; PERINEURAL at 15:56

## 2024-06-15 RX ADMIN — HEPARIN SODIUM 2900 UNITS: 5000 INJECTION INTRAVENOUS; SUBCUTANEOUS at 10:02

## 2024-06-15 RX ADMIN — TERAZOSIN HYDROCHLORIDE 1 MG: 1 CAPSULE ORAL at 20:03

## 2024-06-15 RX ADMIN — DULOXETINE HYDROCHLORIDE 30 MG: 30 CAPSULE, DELAYED RELEASE ORAL at 20:03

## 2024-06-15 RX ADMIN — Medication 10 ML: at 21:28

## 2024-06-15 RX ADMIN — HEPARIN SODIUM 4000 UNITS: 1000 INJECTION INTRAVENOUS; SUBCUTANEOUS at 22:25

## 2024-06-15 RX ADMIN — BRIMONIDINE TARTRATE 1 DROP: 2 SOLUTION OPHTHALMIC at 20:03

## 2024-06-15 NOTE — CONSULTS
[facility]   Consult Note    Patient Name: Dilip Verma  : 1950  MRN: [id number]  Primary Care Physician:  Fernando Camargo DO  Referring Physician: Candido Arroyo MD  Date of admission: 2024    Inpatient Vascular Surgery Consult  Consult performed by: Dante Gates MD  Consult ordered by: Perla Mayen MD        Subjective   Subjective     Reason for Consult/ Chief Complaint: Urgent hemodialysis access evaluation.    History of Present Illness  Dilip Verma is a 74 y.o. male admitted to the hospital for multiple acute and chronic medical issues including UTI with lower extremity cellulitis heart failure diabetes delirium.  He is now progressed to requiring initiation of hemodialysis on an urgent manner.    Review of Systems     Personal History     Past Medical History:   Diagnosis Date    AMENA (acute kidney injury) 2020    Alcoholism     not since     CORI positive     Anemia     Anxiety     Ataxia     Atypical chest pain 2022    SEEN AT [facility] ER    Balance disorder     Carotid stenosis 3/28/2024    Cataract     BILATERAL, S/P EXTRACTION    Cervical radiculopathy 2019    SEEN AT  [facility] ER    Cervical spinal cord compression     Chronic diastolic (congestive) heart failure     Chronic kidney disease     STAGE 3, FOLLOWED BY DR. MAYEN    Chronic pancreatitis     Closed left subtrochanteric femur fracture 2020    ADMITTED TO [facility]    Closed nondisplaced intertrochanteric fracture of left femur 2020    ADMITTED TO [facility]    Colon polyps     FOLLOWED BY DR. AVTAR JEONG    Constipation     Contracture, right hand     Coronary artery disease     CABG 2019    COVID-19 2022    DDD (degenerative disc disease), cervical     Diabetes mellitus, type II     IDDM    Diabetic retinopathy     Difficulty walking     Dysphagia     Elevated brain natriuretic peptide (BNP) level 2014    Elevated LFTs 2021    Erectile dysfunction     Fissure, anal     Foot  "drop     Fuchs' corneal dystrophy of right eye     Glaucoma     BILATERAL    History of alcohol abuse     Hyperlipidemia     Hypersomnia     Hypertension     Hypertensive urgency 02/25/2020    ADMITTED TO [facility]    Insomnia     Kidney stones     LV dysfunction 06/2016    Lyme disease     Lymphadenopathy syndrome 05/2021    Macular edema     BILATERAL    Myocardial infarction 11/05/2019    NSTEMI, ADMITTED TO [facility]    Myocardial infarction 07/12/2019    NSTEMI, ADMITTED TO [facility]    Neuropathy in diabetes     Non-celiac gluten sensitivity     Orthostasis     Osteoporosis     Oxygen dependent     PAD (peripheral artery disease)     Paroxysmal atrial fibrillation 6/6/2024    PNA (pneumonia) 06/2016    LEFT LOBE    Polyneuropathy     PTSD (post-traumatic stress disorder)     Pulmonary hypertension     Pulmonary nodule     Senile ectropion of both lower eyelids 02/2022    Sepsis 12/16/2020    D/T UTI, ADMITTED TO [facility]    Sleep apnea     STATES DOESN'T USE BIPAP OR CPAP    Spinal stenosis in cervical region     SEVERE-LIMITED ROM    Stroke 2012    \"slight stroke\"    Syncope and collapse 07/06/2019    ADMITTED TO [facility]    Urinary retention 04/05/2021    SEEN AT [facility] ER    Vision loss     Vitamin D deficiency        Past Surgical History:   Procedure Laterality Date    CARDIAC CATHETERIZATION N/A 07/15/2019    Procedure: Coronary angiography;  Surgeon: Carrie Price MD;  Location: [facility] CATH [facility]VASIVE LOCATION;  Service: Cardiovascular    CARDIAC CATHETERIZATION N/A 07/15/2019    Procedure: Left Heart Cath;  Surgeon: Carrie Price MD;  Location: [facility] CATH [facility]IVE LOCATION;  Service: Cardiovascular    CARDIAC CATHETERIZATION N/A 07/15/2019    Procedure: Left ventriculography;  Surgeon: Carrie Price MD;  Location: [facility] CATH [facility]VASIVE LOCATION;  Service: Cardiovascular    CARDIAC CATHETERIZATION  07/15/2019    Procedure: Functional Flow [place];  Surgeon: Carrie Price MD;  Location: [facility] CATH INVASIVE LOCATION;  " Service: Cardiovascular    CARDIAC CATHETERIZATION N/A 11/06/2019    Procedure: Right and Left Heart Cath;  Surgeon: Mya Smith MD;  Location: [facility];  Service: Cardiovascular    CARDIAC CATHETERIZATION N/A 11/06/2019    Procedure: Coronary angiography;  Surgeon: Mya Smith MD;  Location: [facility] CATH INVASIVE LOCATION;  Service: Cardiovascular    CARDIAC SURGERY      CATARACT EXTRACTION Left 2014    CATARACT EXTRACTION Right 11/2016    PHACO/IOL, DR. LEN DENTON    COLONOSCOPY N/A 03/16/2023    ENTIRE COLON WNL, RESCOPE IN 5 YRS, DR. LINDA LIZARRAGA AT [facility]    COLONOSCOPY W/ POLYPECTOMY N/A 01/02/2015    A FEW DIVERTICULA IN SIGMOID, 6 MM TUBULOVILLOUS ADENOMA POLYP IN RECTUM, SMALL HEMORRHOIDS, MELANOSIS COLI, DR. AVTAR JEONG AT [place] [facility]    CORONARY ARTERY BYPASS GRAFT N/A 07/18/2019    Procedure: INTRAOPERATIVE SHOAIB; STERNOTOMY CORONARY ARTERY BYPASS x 3  USING LEFT INTERNAL MAMMARY ARTERY GRAFT UTILIZING ENDOSCOPICALLY HARVESTED RIGHT GREATER SAPHENOUS VEIN AND PRP.;  Surgeon: Bill Devi MD;  Location: [facility] OR;  Service: Cardiothoracic    CYSTOSCOPY BLADDER STONE LITHOTRIPSY N/A     DENTAL PROCEDURE Bilateral     3 surgeries inder implants    FEMUR IM NAILING/RODDING Left 12/03/2020    Procedure: LEFT HIP INTRAMEDULLARY NAIL;  Surgeon: Niraj Ravi MD;  Location: [facility] OR;  Service: Orthopedic Spine;  Laterality: Left;    INCISION AND DRAINAGE PERIRECTAL ABSCESS N/A 10/04/2023    Procedure: Incision and drainage of perianal and buttock abscess;  Surgeon: Herbie Villatoro MD;  Location: [facility] MAIN OR;  Service: General;  Laterality: N/A;    INGUINAL HERNIA REPAIR Bilateral     TOENAIL EXCISION  06/2022    TONSILLECTOMY Bilateral     TOTAL HIP ARTHROPLASTY REVISION Left 01/11/2022    Procedure: TOTAL HIP ARTHROPLASTY REVISION- POSTERIOR;  Surgeon: Jurgen Candelaria II, MD;  Location: [facility] MAIN OR;  Service: Orthopedics;  Laterality:  Left;    VASECTOMY N/A        Family History: His family history includes Alcohol abuse in his brother and paternal grandfather; Arrhythmia in his mother; Cancer in his mother; Depression in his mother and sister; Glaucoma in his maternal grandmother; Heart attack in his paternal grandmother; Heart disease in his father and mother; Heart failure in his father; Hyperlipidemia in his father and mother; Hypertension in his father and mother; Kidney disease in his father; Lung disease in his paternal uncle; Mental illness in his mother; Migraines in his mother; Stroke in his maternal grandmother and paternal grandmother; Thyroid disease in his father and paternal uncle; Uterine cancer in his mother.     Social History: He  reports that he quit smoking about 24 years ago. His smoking use included cigarettes. He started smoking about 40 years ago. He has a 12 pack-year smoking history. He has been exposed to tobacco smoke. He has never used smokeless tobacco. He reports that he does not currently use alcohol. He reports that he does not use drugs.    Home Medications:  DULoxetine, Insulin Glargine (2 Unit Dial), Magnesium, Vitamin D-3, aspirin, brimonidine, bumetanide, insulin lispro, latanoprost, multivitamin with minerals, sildenafil, testosterone, and traMADol    Allergies:  He is allergic to ace inhibitors, angiotensin receptor blockers, dapagliflozin, losartan, seroquel [quetiapine], xanax [alprazolam], amlodipine, ativan [lorazepam], baclofen, misc. sulfonamide containing compounds, minoxidil, and tetracycline.    Objective    Objective     Vitals:    Temp:  [94.5 °F (34.7 °C)-98.4 °F (36.9 °C)] 94.5 °F (34.7 °C)  Heart Rate:  [55-74] 55  Resp:  [17-18] 18  BP: (129-163)/(62-68) 129/68  Flow (L/min):  [3] 3  Output by Drain (mL) 06/14/24 0701 - 06/14/24 1900 06/14/24 1901 - 06/15/24 0700 06/15/24 0701 - 06/15/24 1525 Range Total   Urethral Catheter Straight-tip 16 Fr.  500  500       Physical Exam  Constitutional:        Appearance: He is well-developed.   Pulmonary:      Effort: Pulmonary effort is normal. No respiratory distress.   Abdominal:      General: There is no distension.      Palpations: Abdomen is soft.   Neurological:      Mental Status: He is alert and oriented to person, place, and time.         Result Review    Result Review:  I have personally reviewed the results from the time of this admission to 6/15/2024 15:25 EDT and agree with these findings:  [x]  Laboratory list / accordion  []  Microbiology  []  Radiology  []  EKG/Telemetry   []  Cardiology/Vascular   []  Pathology  []  Old records  []  Other:  Most notable findings include: Creatinine 6.9 sodium 135 PTT 48 hemoglobin 10.7 platelet count 268      Assessment & Plan   Assessment / Plan     Brief Patient Summary:  Dilip Verma is a 74 y.o. male worsening renal failure requiring initiation of hemodialysis.    Active Hospital Problems:  Active Hospital Problems    Diagnosis     **Acute UTI (urinary tract infection)     Paroxysmal atrial fibrillation     AMENA (acute kidney injury)     Sepsis     Hyperkalemia     Metabolic encephalopathy     Peripheral arterial disease     History of stroke     CKD (chronic kidney disease) stage 3, GFR 30-59 ml/min     HTN (hypertension)     CSA (central sleep apnea)     Chronic diastolic heart failure     Hx of CABG     CAD (coronary artery disease)     Type 2 diabetes mellitus with hyperglycemia, with long-term current use of insulin        Plan:   6/15/2024: Will plan on Shiley catheter placement in radiology under fluoroscopic guidance.  Risk benefits to procedure discussed with patient and wife.  Informed consent obtained.    VTE Prophylaxis:  Pharmacologic VTE prophylaxis orders are present.         Dante Gates MD

## 2024-06-15 NOTE — PROGRESS NOTES
Name: Dilip Verma ADMIT: 2024   : 1950  PCP: Fernando Camargo DO    MRN: [id number] LOS: 15 days   AGE/SEX: 74 y.o. male  ROOM: [facility]     Subjective   Subjective   Patient is seen at bedside, no new complaints.       Objective   Objective   Vital Signs  Temp:  [98.2 °F (36.8 °C)-98.6 °F (37 °C)] 98.4 °F (36.9 °C)  Heart Rate:  [66-75] 68  Resp:  [17-21] 17  BP: (121-163)/(61-78) 163/62  SpO2:  [90 %-96 %] 96 %  on  Flow (L/min):  [3] 3;   Device (Oxygen Therapy): nasal cannula;humidified  Body mass index is 32.15 kg/m².  Physical Exam  Vitals and nursing note reviewed.   Constitutional:       General: He is not in acute distress.     Appearance: He is ill-appearing (chronically). He is not toxic-appearing or diaphoretic.   HENT:      Head: Normocephalic and atraumatic.      Nose: Nose normal.      Mouth/Throat:      Mouth: Mucous membranes are moist.      Pharynx: Oropharynx is clear.   Eyes:      Conjunctiva/sclera: Conjunctivae normal.      Pupils: Pupils are equal, round, and reactive to light.   Cardiovascular:      Rate and Rhythm: Normal rate and regular rhythm.      Pulses: Normal pulses.   Pulmonary:      Effort: Pulmonary effort is normal.      Breath sounds: Normal breath sounds.   Abdominal:      General: Bowel sounds are normal.      Palpations: Abdomen is soft.      Tenderness: There is no abdominal tenderness.   Musculoskeletal:         General: Swelling (1-2+ BLE) present.      Cervical back: Neck supple.   Skin:     General: Skin is warm and dry.      Copied text material from yesterday's note has been reviewed for appropriate changes and remains accurate as of 24.      Results Review     I reviewed the patient's new clinical results.  Results from last 7 days   Lab Units 24  0655 24  0532 24  0639   WBC 10*3/mm3 8.80 9.76 13.69*   HEMOGLOBIN g/dL 10.9* 11.1* 12.1*   PLATELETS 10*3/mm3 243 209 229     Results from last 7 days   Lab Units 24  0655  06/13/24  0532 06/12/24  0639 06/11/24  0729   SODIUM mmol/L 132* 131* 132* 133*   POTASSIUM mmol/L 4.2 3.9 4.0 3.8   CHLORIDE mmol/L 98 97* 97* 99   CO2 mmol/L 20.2* 21.5* 22.9 25.0   BUN mg/dL 54* 49* 38* 31*   CREATININE mg/dL 5.83* 5.26* 4.55* 3.39*   GLUCOSE mg/dL 168* 149* 168* 213*   EGFR mL/min/1.73 9.5* 10.7* 12.7* 18.1*     Results from last 7 days   Lab Units 06/12/24  1739   ALBUMIN g/dL 2.7*     Results from last 7 days   Lab Units 06/14/24  0655 06/13/24  0532 06/12/24  1739 06/12/24  0639 06/11/24  0729   CALCIUM mg/dL 8.5* 8.3*  --  8.2* 8.0*   ALBUMIN g/dL  --   --  2.7*  --   --        Glucose   Date/Time Value Ref Range Status   06/14/2024 1615 164 (H) 70 - 130 mg/dL Final   06/14/2024 1109 188 (H) 70 - 130 mg/dL Final   06/14/2024 0615 168 (H) 70 - 130 mg/dL Final   06/13/2024 2012 196 (H) 70 - 130 mg/dL Final   06/13/2024 1625 206 (H) 70 - 130 mg/dL Final   06/13/2024 1132 183 (H) 70 - 130 mg/dL Final   06/13/2024 0547 145 (H) 70 - 130 mg/dL Final       XR Chest 1 View    Result Date: 6/14/2024  As described.    This report was finalized on 6/14/2024 2:42 PM by Dr. Gabe Zimmer M.D on Workstation: [id number]       I have personally reviewed all medications:  Scheduled Medications  amiodarone, 200 mg, Oral, Q24H  [Held by provider] apixaban, 5 mg, Oral, Q12H  aspirin, 81 mg, Oral, Daily  brimonidine, 1 drop, Both Eyes, BID  DULoxetine, 30 mg, Oral, Daily  insulin lispro, 2-9 Units, Subcutaneous, 4x Daily AC & at Bedtime  latanoprost, 1 drop, Both Eyes, Nightly  Menthol-Zinc Oxide, 1 Application, Topical, Q12H  metoprolol tartrate, 12.5 mg, Oral, Q12H  midodrine, 10 mg, Oral, TID AC  OLANZapine, 5 mg, Oral, Nightly  senna-docusate sodium, 2 tablet, Oral, BID  sodium chloride, 10 mL, Intravenous, Q12H  terazosin, 1 mg, Oral, Nightly  thiamine, 100 mg, Intravenous, Daily  traMADol, 50 mg, Oral, Once  Vancomycin Pharmacy Intermittent/Pulse Dosing, , Does not apply, Daily    Infusions  heparin,  10.4 Units/kg/hr, Last Rate: 10.4 Units/kg/hr (06/13/24 5711)  Pharmacy to dose vancomycin,     Diet  Diet: Regular/House, Diabetic, Cardiac; Healthy Heart (2-3 Na+); Consistent Carbohydrate; Texture: Mechanical Ground (NDD 2); Fluid Consistency: Thin (IDDSI 0)    I have personally reviewed:  [x]  Laboratory   [x]  Microbiology   [x]  Radiology   [x]  EKG/Telemetry  [x]  Cardiology/Vascular   []  Pathology    []  Records       Assessment/Plan     Active Hospital Problems    Diagnosis  POA    **Acute UTI (urinary tract infection) [N39.0]  Yes    Paroxysmal atrial fibrillation [I48.0]  Yes    AMENA (acute kidney injury) [N17.9]  Yes    Sepsis [A41.9]  Yes    Hyperkalemia [E87.5]  Yes    Metabolic encephalopathy [G93.41]  Yes    Peripheral arterial disease [I73.9]  Yes    History of stroke [Z86.73]  Not Applicable    CKD (chronic kidney disease) stage 3, GFR 30-59 ml/min [N18.30]  Yes    HTN (hypertension) [I10]  Yes    CSA (central sleep apnea) [G47.31]  Yes    Chronic diastolic heart failure [I50.32]  Yes    Hx of CABG [Z95.1]  Not Applicable    CAD (coronary artery disease) [I25.10]  Yes    Type 2 diabetes mellitus with hyperglycemia, with long-term current use of insulin [E11.65, Z79.4]  Not Applicable      Resolved Hospital Problems   No resolved problems to display.       74 y.o. male admitted with Acute UTI (urinary tract infection).    Assessment and plan  Acute UTI/LLE Cellulitis  - urine culture grew Serratia  - completed 9d of abx-cefepime was discontinued 6/8 due to high suspicion of this causing toxic encephalopathy.  - doing well off of abx  - appreciate ID recs     HTNCAD/PAD/New Onset Afib (PAF)/Chronic Diastolic CHF  - BP acceptable, no anginal symptoms, in NSR  - continue on current regimen  - on AC with eliquis but not taking PO now due to severe encephalopathy-hold eliquis and ask pharmacy to dose lovenox  - appreciate cardiology recs     Type 2 DM  - had hypoglycemia, improved off of lantus  -  continue D5W, getting thiamine also  - continue coverage with ssi/hypoglycemia protocol     AMENA  -Nephrology on board, follow management recommendations.     Hypoxia  - CXR noted, improved after additional dose of bumex  - monitor and encourage pulmonary toilet as able     Delirium/Toxic Encephalopathy  - continue on scheduled and PRN zyprexa  - he is current with palliatus at home and takes PRN tramadol, this has been restarted     Hypokalemia  - replace K+          Tony Negrete MD  [facility]  06/14/24  20:10 EDT

## 2024-06-15 NOTE — PROGRESS NOTES
Name: Dilip Verma ADMIT: 2024   : 1950  PCP: Fernando Camargo DO    MRN: [id number] LOS: 16 days   AGE/SEX: 74 y.o. male  ROOM: [facility]     Subjective   Subjective   Patient is seen at bedside, no new complaints.       Objective   Objective   Vital Signs  Temp:  [94.5 °F (34.7 °C)-98.4 °F (36.9 °C)] 94.5 °F (34.7 °C)  Heart Rate:  [55-74] 58  Resp:  [17-18] 18  BP: (129-163)/(62-68) 129/68  SpO2:  [92 %-100 %] 100 %  on  Flow (L/min):  [3] 3;   Device (Oxygen Therapy): humidified;nasal cannula  Body mass index is 32.15 kg/m².  Physical Exam  Vitals and nursing note reviewed.   Constitutional:       General: He is not in acute distress.     Appearance: He is ill-appearing (chronically). He is not toxic-appearing or diaphoretic.   HENT:      Head: Normocephalic and atraumatic.      Nose: Nose normal.      Mouth/Throat:      Mouth: Mucous membranes are moist.      Pharynx: Oropharynx is clear.   Eyes:      Conjunctiva/sclera: Conjunctivae normal.      Pupils: Pupils are equal, round, and reactive to light.   Cardiovascular:      Rate and Rhythm: Normal rate and regular rhythm.      Pulses: Normal pulses.   Pulmonary:      Effort: Pulmonary effort is normal.      Breath sounds: Normal breath sounds.   Abdominal:      General: Bowel sounds are normal.      Palpations: Abdomen is soft.      Tenderness: There is no abdominal tenderness.   Musculoskeletal:         General: Swelling (1-2+ BLE) present.      Cervical back: Neck supple.   Skin:     General: Skin is warm and dry.      Copied text material from yesterday's note has been reviewed for appropriate changes and remains accurate as of 6/15/24.      Results Review     I reviewed the patient's new clinical results.  Results from last 7 days   Lab Units 06/15/24  0703 24  0655 24  0532 24  0639   WBC 10*3/mm3 7.52 8.80 9.76 13.69*   HEMOGLOBIN g/dL 10.7* 10.9* 11.1* 12.1*   PLATELETS 10*3/mm3 268 243 209 229     Results from last 7  days   Lab Units 06/15/24  0703 06/14/24  0655 06/13/24  0532 06/12/24  0639   SODIUM mmol/L 136 132* 131* 132*   POTASSIUM mmol/L 4.1 4.2 3.9 4.0   CHLORIDE mmol/L 100 98 97* 97*   CO2 mmol/L 20.9* 20.2* 21.5* 22.9   BUN mg/dL 66* 54* 49* 38*   CREATININE mg/dL 6.93* 5.83* 5.26* 4.55*   GLUCOSE mg/dL 143* 168* 149* 168*   EGFR mL/min/1.73 7.7* 9.5* 10.7* 12.7*     Results from last 7 days   Lab Units 06/12/24  1739   ALBUMIN g/dL 2.7*     Results from last 7 days   Lab Units 06/15/24  0703 06/14/24  0655 06/13/24  0532 06/12/24  1739 06/12/24  0639   CALCIUM mg/dL 8.2* 8.5* 8.3*  --  8.2*   ALBUMIN g/dL  --   --   --  2.7*  --      Results from last 7 days   Lab Units 06/15/24  1611   LACTATE mmol/L 0.8     Glucose   Date/Time Value Ref Range Status   06/15/2024 1640 156 (H) 70 - 130 mg/dL Final   06/15/2024 1610 187 (H) 70 - 130 mg/dL Final   06/15/2024 1118 208 (H) 70 - 130 mg/dL Final   06/15/2024 0545 178 (H) 70 - 130 mg/dL Final   06/14/2024 2041 169 (H) 70 - 130 mg/dL Final   06/14/2024 1615 164 (H) 70 - 130 mg/dL Final   06/14/2024 1109 188 (H) 70 - 130 mg/dL Final       XR Chest 1 View    Result Date: 6/14/2024  As described.    This report was finalized on 6/14/2024 2:42 PM by Dr. Gabe Zimmer M.D on Workstation: [id number]       I have personally reviewed all medications:  Scheduled Medications  amiodarone, 200 mg, Oral, Q24H  [Held by provider] apixaban, 5 mg, Oral, Q12H  aspirin, 81 mg, Oral, Daily  brimonidine, 1 drop, Both Eyes, BID  DULoxetine, 30 mg, Oral, Daily  guaiFENesin, 600 mg, Oral, Q12H  insulin lispro, 2-9 Units, Subcutaneous, 4x Daily AC & at Bedtime  ipratropium-albuterol, 3 mL, Nebulization, 4x Daily - RT  latanoprost, 1 drop, Both Eyes, Nightly  Menthol-Zinc Oxide, 1 Application, Topical, Q12H  metoprolol tartrate, 12.5 mg, Oral, Q12H  midodrine, 10 mg, Oral, TID AC  OLANZapine, 5 mg, Oral, Nightly  senna-docusate sodium, 2 tablet, Oral, BID  sodium chloride, 10 mL, Intravenous,  Q12H  sodium chloride, 4 mL, Nebulization, BID - RT  terazosin, 1 mg, Oral, Nightly  thiamine, 100 mg, Intravenous, Daily  traMADol, 50 mg, Oral, Once  vancomycin, 500 mg, Intravenous, Once  Vancomycin Pharmacy Intermittent/Pulse Dosing, , Does not apply, Daily    Infusions  heparin, 10.4 Units/kg/hr, Last Rate: Stopped (06/15/24 1239)  Pharmacy to dose vancomycin,     Diet  NPO Diet NPO Type: Strict NPO    I have personally reviewed:  [x]  Laboratory   [x]  Microbiology   [x]  Radiology   [x]  EKG/Telemetry  [x]  Cardiology/Vascular   []  Pathology    []  Records       Assessment/Plan     Active Hospital Problems    Diagnosis  POA    **Acute UTI (urinary tract infection) [N39.0]  Yes    Paroxysmal atrial fibrillation [I48.0]  Yes    AMENA (acute kidney injury) [N17.9]  Yes    Sepsis [A41.9]  Yes    Hyperkalemia [E87.5]  Yes    Metabolic encephalopathy [G93.41]  Yes    Peripheral arterial disease [I73.9]  Yes    History of stroke [Z86.73]  Not Applicable    CKD (chronic kidney disease) stage 3, GFR 30-59 ml/min [N18.30]  Yes    HTN (hypertension) [I10]  Yes    CSA (central sleep apnea) [G47.31]  Yes    Chronic diastolic heart failure [I50.32]  Yes    Hx of CABG [Z95.1]  Not Applicable    CAD (coronary artery disease) [I25.10]  Yes    Type 2 diabetes mellitus with hyperglycemia, with long-term current use of insulin [E11.65, Z79.4]  Not Applicable      Resolved Hospital Problems   No resolved problems to display.       74 y.o. male admitted with Acute UTI (urinary tract infection).    Assessment and plan  Acute UTI/LLE Cellulitis  - urine culture grew Serratia  - completed 9d of abx-cefepime was discontinued 6/8 due to high suspicion of this causing toxic encephalopathy.  - doing well off of abx  - appreciate ID recs     HTNCAD/PAD/New Onset Afib (PAF)/Chronic Diastolic CHF  - BP acceptable, no anginal symptoms, in NSR  - continue on current regimen  - on AC with eliquis but not taking PO now due to severe  encephalopathy-hold eliquis and ask pharmacy to dose lovenox  - appreciate cardiology recs     Type 2 DM  - had hypoglycemia, improved off of lantus  - continue D5W, getting thiamine also  - continue coverage with ssi/hypoglycemia protocol     AMENA  -Nephrology on board, follow management recommendations.     Hypoxia  - CXR noted, improved after additional dose of bumex  - monitor and encourage pulmonary toilet as able     Delirium/Toxic Encephalopathy  - continue on scheduled and PRN zyprexa  - he is current with palliatus at home and takes PRN tramadol, this has been restarted     Hypokalemia  - replace K+       Tony Negrete MD  [facility]  06/15/24  17:00 EDT

## 2024-06-15 NOTE — PROGRESS NOTES
"[facility] Clinical Pharmacy Services: Vancomycin Monitoring Note    Dilip Verma is a 74 y.o. male who is on day 3/7 of pharmacy to dose vancomycin for SSTI.    Previous Vancomycin Dose: Intermittent pulse dosing   Updated Cultures and Sensitivities:   -5/30 Urine cx: serratia marcescens   -5/30 Blood cx (2/2): NGTD  -6/4 MRSA PCR: NEGative   Results from last 7 days   Lab Units 06/14/24  0655 06/13/24  1745   VANCOMYCIN RM mcg/mL 20.90 22.80     Vitals/Labs  Ht: 172.7 cm (68\"); Wt: 95.9 kg (211 lb 6.7 oz)   Temp Readings from Last 1 Encounters:   06/15/24 98.2 °F (36.8 °C) (Oral)     Estimated Creatinine Clearance: 12.3 mL/min (A) (by C-G formula based on SCr of 5.83 mg/dL (H)).   CKD + AMENA    Results from last 7 days   Lab Units 06/14/24  0655 06/13/24  0532 06/12/24  0639   CREATININE mg/dL 5.83* 5.26* 4.55*   WBC 10*3/mm3 8.80 9.76 13.69*     Assessment/Plan  Scr continues to worsen and drug clearance is very minimal. Random drug level this AM returned at 19.8 mg/L and is reflective of a 48 hr level. No supplemental dose is indicated at this time; however, if nephrology decides dialysis is indicated, the plan will be adjusted accordingly.    Current Vancomycin Dose: No supplemental dose indicated   Next Level Date and Time: Vanc Random on 6/16 with AM labs   We will continue to monitor patient changes and renal function     Thank you for involving pharmacy in this patient's care. Please contact pharmacy with any questions or concerns.         ADDENDUM: patient will proceed with a short duration dialysis session today. In light of this, I will add a modest supplemental dose of vancomycin 500 mg x 1 to be given at the conclusion of dialysis.     Cheyenne Gowers, [facility]  Clinical Pharmacist  "

## 2024-06-15 NOTE — PROCEDURES
Date of Admission:  5/30/2024  Today's Date:  06/15/24  Dante Gates MD  [facility]    Procedure Note  Non-tunneled central venous catheter placement for hemodialysis with fluoroscopy    Pre-op Diagnosis:   Acute renal failure    Post-op Diagnosis:     Same     Procedure:    1) Insertion of non-tunneled central venous catheter   2) fluoroscopic guidance for catheter tip placement.    Surgeon: Dante Gates MD    Assistant:  None    Anesthesia:   lidocaine 1% without epinephrine    Estimated Blood Loss:   Minimal    Complications:  None    Findings:   Successful placement of non tunneled dialysis catheter    Indications:    The patient is an 74 y.o. male referred for acute dialysis catheter placement secondary to Acute renal failure.  Written consent was obtained.  The risks, benefits, and turns were also discussed.  The patient was identified via the arm band and hospital assigned identification number.  Immediately prior to the procedure a timeout was called to verify the correct patient, procedure, equipment, support staff and the site/side was marked as required.       Procedure:  Site: Right internal jugular vein  Preparation: skin prepped with 2% chlorhexidine  Skin prep agent dried: skin prep agent completely dried prior to procedure  Sterile barriers: all five maximum sterile barriers used - cap, mask, sterile gown, sterile gloves, and large sterile sheet  Hand hygiene: hand hygiene performed prior to central venous catheter insertion  Patient position: Flat  Catheter type: double lumen with pigtail  Catheter size: 14 Fr 15 cm.  In the Right internal jugular vein  Ultrasound guidance: yes, with photographic documentation recorded in the chart  Number of attempts: 1  Successful placement: yes  X-ray/fluoroscopic guidance was used to place the catheter tip in the mid superior vena cava.  Post-procedure: line sutured and dressing applied  Assessment: blood return through all ports and was locked with  concentrated heparin (1000units/cc)  Patient tolerated the procedure well with no immediate complications    Dante Gates MD     Date: 6/15/2024  Time: 15:28 EDT    Active Hospital Problems    Diagnosis  POA    **Acute UTI (urinary tract infection) [N39.0]  Yes    Paroxysmal atrial fibrillation [I48.0]  Yes    AMENA (acute kidney injury) [N17.9]  Yes    Sepsis [A41.9]  Yes    Hyperkalemia [E87.5]  Yes    Metabolic encephalopathy [G93.41]  Yes    Peripheral arterial disease [I73.9]  Yes    History of stroke [Z86.73]  Not Applicable    CKD (chronic kidney disease) stage 3, GFR 30-59 ml/min [N18.30]  Yes    HTN (hypertension) [I10]  Yes    CSA (central sleep apnea) [G47.31]  Yes    Chronic diastolic heart failure [I50.32]  Yes    Hx of CABG [Z95.1]  Not Applicable    CAD (coronary artery disease) [I25.10]  Yes    Type 2 diabetes mellitus with hyperglycemia, with long-term current use of insulin [E11.65, Z79.4]  Not Applicable      Resolved Hospital Problems   No resolved problems to display.

## 2024-06-15 NOTE — OP NOTE
Dante Gates MD  Physician  Vascular Surgery     Procedures     Signed     Date of Service: 06/15/24 1528  Creation Time: 06/15/24 1528     Signed         Date of Admission:  5/30/2024  Today's Date:  06/15/24  Dante Gates MD  [facility]     Procedure Note  Non-tunneled central venous catheter placement for hemodialysis with fluoroscopy     Pre-op Diagnosis:   Acute renal failure     Post-op Diagnosis:     Same     Procedure:       1) Insertion of non-tunneled central venous catheter   2) fluoroscopic guidance for catheter tip placement.     Surgeon: Dante Gates MD     Assistant:  None     Anesthesia:   lidocaine 1% without epinephrine     Estimated Blood Loss:   Minimal     Complications:  None     Findings:   Successful placement of non tunneled dialysis catheter     Indications:     The patient is an 74 y.o. male referred for acute dialysis catheter placement secondary to Acute renal failure.  Written consent was obtained.  The risks, benefits, and turns were also discussed.  The patient was identified via the arm band and hospital assigned identification number.  Immediately prior to the procedure a timeout was called to verify the correct patient, procedure, equipment, support staff and the site/side was marked as required.       Procedure:  Site: Right internal jugular vein  Preparation: skin prepped with 2% chlorhexidine  Skin prep agent dried: skin prep agent completely dried prior to procedure  Sterile barriers: all five maximum sterile barriers used - cap, mask, sterile gown, sterile gloves, and large sterile sheet  Hand hygiene: hand hygiene performed prior to central venous catheter insertion  Patient position: Flat  Catheter type: double lumen with pigtail  Catheter size: 14 Fr 15 cm.  In the Right internal jugular vein  Ultrasound guidance: yes, with photographic documentation recorded in the chart  Number of attempts: 1  Successful placement: yes  X-ray/fluoroscopic guidance was  used to place the catheter tip in the mid superior vena cava.  Post-procedure: line sutured and dressing applied  Assessment: blood return through all ports and was locked with concentrated heparin (1000units/cc)  Patient tolerated the procedure well with no immediate complications     Dante Gates MD      Date: 6/15/2024  Time: 15:28 EDT           Active Hospital Problems     Diagnosis   POA    **Acute UTI (urinary tract infection) [N39.0]   Yes    Paroxysmal atrial fibrillation [I48.0]   Yes    AMENA (acute kidney injury) [N17.9]   Yes    Sepsis [A41.9]   Yes    Hyperkalemia [E87.5]   Yes    Metabolic encephalopathy [G93.41]   Yes    Peripheral arterial disease [I73.9]   Yes    History of stroke [Z86.73]   Not Applicable    CKD (chronic kidney disease) stage 3, GFR 30-59 ml/min [N18.30]   Yes    HTN (hypertension) [I10]   Yes    CSA (central sleep apnea) [G47.31]   Yes    Chronic diastolic heart failure [I50.32]   Yes    Hx of CABG [Z95.1]   Not Applicable    CAD (coronary artery disease) [I25.10]   Yes    Type 2 diabetes mellitus with hyperglycemia, with long-term current use of insulin [E11.65, Z79.4]

## 2024-06-15 NOTE — PROGRESS NOTES
PROGRESS NOTE      Patient Name: Dilip Verma  : 1950  MRN: [id number]  Primary Care Physician: Fernando Camargo DO  Date of admission: 2024    Patient Care Team:  Fernando Camargo DO as PCP - General (Family Medicine)  Morris Cai MD as Consulting Physician (Sleep Medicine)  Michaela Hylton MD as Consulting Physician (Cardiology)  Hardy Banerjee MD as Consulting Physician (Ophthalmology)  Chula Christianson MD as Consulting Physician (Ophthalmology)  Jason Sherman MD (Endocrinology)  Perla Mayen MD as Consulting Physician (Nephrology)  Federico Hebert MD as Consulting Physician (Pulmonary Disease)  Niraj Ravi MD as Consulting Physician (Orthopedic Surgery)  Papa Velasco MD as Consulting Physician (Urology)  Terese Yost MD as Consulting Physician (Gastroenterology)  Aracelis Ball MD as Consulting Physician (Colon and Rectal Surgery)  [facility] as Primary Care Provider        Reason for Follow up:     AMENA, CKD III      Subjective:     Patient seen and examined, renal function worsening     Review of Systems:         Constitutional: weakness.  HEENT:  No headache, otalgia, itchy eyes, nasal discharge or sore throat.  Cardiac:  No chest pain, dyspnea, orthopnea or PND.  Chest:  No cough, phlegm or wheezing.  Abdomen:  No abdominal pain, nausea or vomiting.  Neuro:  No focal weakness, abnormal movements or seizure-like activity.  :   No hematuria, no pyuria, no dysuria, no flank pain.  Extremities:  No  joint pains.  ROS was otherwise negative except as mentioned in the [place].    Social History:  reports that he quit smoking about 24 years ago. His smoking use included cigarettes. He started smoking about 40 years ago. He has a 12 pack-year smoking history. He has been exposed to tobacco smoke. He has never used smokeless tobacco. He reports that he does not currently use alcohol. He reports that he does not use  drugs.    Medications:  Prior to Admission medications    Medication Sig Start Date End Date Taking? Authorizing Provider   aspirin 81 MG EC tablet Take 1 tablet by mouth Every Night.   Yes Heri Rinaldi MD   brimonidine (ALPHAGAN) 0.2 % ophthalmic solution Administer 1 drop to both eyes 2 (Two) Times a Day.   Yes Heri Rinaldi MD   bumetanide (BUMEX) 1 MG tablet Take 1 tablet by mouth Daily.   Yes Heri Rinaldi MD   Cholecalciferol (Vitamin D-3) 125 MCG (5000 UT) tablet Take 1 tablet by mouth Daily.   Yes Heri Rinaldi MD   DULoxetine (CYMBALTA) 30 MG capsule Take 1 capsule by mouth Every Night. 1/16/24  Yes Heri Rinaldi MD   Insulin Glargine, 2 Unit Dial, (TOUJEO) 300 UNIT/ML solution pen-injector injection Inject 24 Units under the skin into the appropriate area as directed Daily.   Yes Heri Rinaldi MD   insulin lispro (humaLOG) 100 UNIT/ML injection Inject 0-14 Units under the skin into the appropriate area as directed 3 (Three) Times a Day Before Meals.  Patient taking differently: Inject 0-14 Units under the skin into the appropriate area as directed 3 (Three) Times a Day Before Meals. SLIDING SCALE  100-150 - 4 units  151-200 - 5 units  201-250 - 6 units  251-300 - 7 units  301-350 - 8 units  351-400 - 9 units  401+ - 10 units 12/7/20  Yes Amador Valverde MD   latanoprost (XALATAN) 0.005 % ophthalmic solution Administer 1 drop to both eyes every night at bedtime. 1/16/23  Yes Heri Rinaldi MD   multivitamin with minerals (MULTIVITAMIN ADULTS PO) Take 1 tablet by mouth Daily.   Yes Heri Rinaldi MD   testosterone (ANDROGEL) 25 MG/2.5GM (1%) gel gel Place 25 mg on the skin as directed by provider 2 (Two) Times a Week.   Yes Heri Rinaldi MD   traMADol (ULTRAM) 50 MG tablet Take 1 tablet by mouth At Night As Needed. 10/16/23  Yes Heri Rinaldi MD   Magnesium 400 MG capsule Take 400 mg by mouth As Needed.    Heri Rinaldi MD    sildenafil (REVATIO) 20 MG tablet Take 1 tablet by mouth As Needed.    Provider, MD Heri     Scheduled Meds:amiodarone, 200 mg, Oral, Q24H  [Held by provider] apixaban, 5 mg, Oral, Q12H  aspirin, 81 mg, Oral, Daily  brimonidine, 1 drop, Both Eyes, BID  DULoxetine, 30 mg, Oral, Daily  insulin lispro, 2-9 Units, Subcutaneous, 4x Daily AC & at Bedtime  latanoprost, 1 drop, Both Eyes, Nightly  Menthol-Zinc Oxide, 1 Application, Topical, Q12H  metoprolol tartrate, 12.5 mg, Oral, Q12H  midodrine, 10 mg, Oral, TID AC  OLANZapine, 5 mg, Oral, Nightly  senna-docusate sodium, 2 tablet, Oral, BID  sodium chloride, 10 mL, Intravenous, Q12H  terazosin, 1 mg, Oral, Nightly  thiamine, 100 mg, Intravenous, Daily  traMADol, 50 mg, Oral, Once  Vancomycin Pharmacy Intermittent/Pulse Dosing, , Does not apply, Daily      Continuous Infusions:heparin, 10.4 Units/kg/hr, Last Rate: Stopped (06/15/24 1239)  Pharmacy to dose vancomycin,           PRN Meds:  acetaminophen **OR** acetaminophen **OR** acetaminophen    senna-docusate sodium **AND** polyethylene glycol **AND** bisacodyl **AND** bisacodyl    dextrose    dextrose    glucagon (human recombinant)    heparin (porcine)    nitroglycerin    OLANZapine    ondansetron    Pharmacy to dose vancomycin    sodium chloride    sodium chloride  Allergies:    Allergies   Allergen Reactions    Ace Inhibitors Angioedema    Angiotensin Receptor Blockers Angioedema and Unknown (See Comments)     Angioneurotic edema    Dapagliflozin Other (See Comments)     UTI,  rectal abcess    Losartan Angioedema     Angioneurotic edema    Seroquel [Quetiapine] Hallucinations    Xanax [Alprazolam] Unknown - High Severity    Amlodipine Swelling    Ativan [Lorazepam] Hallucinations    Baclofen Hallucinations    Misc. Sulfonamide Containing Compounds Unknown (See Comments)    Minoxidil Confusion    Tetracycline Rash     bliisters in mouth         Objective   Exam:     Vital Signs  Temp:  [98.2 °F (36.8 °C)-98.4  "°F (36.9 °C)] 98.2 °F (36.8 °C)  Heart Rate:  [66-74] 74  Resp:  [17-21] 18  BP: (121-163)/(61-67) 156/67  SpO2:  [92 %-96 %] 95 %  on  Flow (L/min):  [3] 3;   Device (Oxygen Therapy): nasal cannula  Body mass index is 32.15 kg/m².       Exam:     /67 (BP Location: Right arm, Patient Position: Lying)   Pulse 74   Temp 98.2 °F (36.8 °C) (Oral)   Resp 18   Ht 172.7 cm (68\")   Wt 95.9 kg (211 lb 6.7 oz)   SpO2 95%   BMI 32.15 kg/m²     General Appearance:    Appears chronically ill, no distress   Head:    Normocephalic, atraumatic   Eyes:     EOM's intact, sclerae anicteric        Ears:    TMs not observed   Nose:   Patent without discharge   Neck:   Supple, JVD   Lungs:     Clear to auscultation bilaterally, respiratory effort is normal   Chest wall:    No tenderness   Heart:    Regular rate and rhythm, S1 and S2 normal, no   rub    or gallop   Abdomen:     Soft, nontender, nondistended,  no masses, no organomegaly   Extremities:   No edema   Neurologic:  No focal deficits.  Speech is fluent.  Conversation is coherent.      Results Review:  I have personally reviewed most recent Data :  BMP @LABRCNT(creatinine:10)  CBC    Results from last 7 days   Lab Units 06/15/24  0703 06/14/24  0655 06/13/24  0532 06/12/24  0639   WBC 10*3/mm3 7.52 8.80 9.76 13.69*   HEMOGLOBIN g/dL 10.7* 10.9* 11.1* 12.1*   PLATELETS 10*3/mm3 268 243 209 229     CMP   Results from last 7 days   Lab Units 06/15/24  0703 06/14/24  0655 06/13/24  0532 06/12/24  1859 06/12/24  1739 06/12/24  0639 06/11/24  0729 06/10/24  0701 06/09/24  0612   SODIUM mmol/L 136 132* 131*  --   --  132* 133* 136 142   POTASSIUM mmol/L 4.1 4.2 3.9  --   --  4.0 3.8 3.3* 3.1*   CHLORIDE mmol/L 100 98 97*  --   --  97* 99 102 105   CO2 mmol/L 20.9* 20.2* 21.5*  --   --  22.9 25.0 23.1 25.0   BUN mg/dL 66* 54* 49*  --   --  38* 31* 23 24*   CREATININE mg/dL 6.93* 5.83* 5.26*  --   --  4.55* 3.39* 2.34* 1.63*   GLUCOSE mg/dL 143* 168* 149*  --   --  168* " 213* 155* 125*   ALBUMIN g/dL  --   --   --   --  2.7*  --   --   --   --    AMMONIA umol/L  --   --   --  31  --   --   --   --   --      ABG          XR Chest 1 View    Result Date: 6/14/2024  As described.    This report was finalized on 6/14/2024 2:42 PM by Dr. Gabe Zimmer M.D on Workstation: [id number]      XR Chest 1 View    Result Date: 6/11/2024  As described.    This report was finalized on 6/11/2024 2:46 PM by Dr. Gabe Zimmer M.D on Workstation: [id number]       Results for orders placed during the hospital encounter of 05/30/24    Adult Transthoracic Echo Complete W/ Cont if Necessary Per Protocol    Interpretation Summary    Left ventricular systolic function is hyperdynamic (EF > 70%). Calculated left ventricular EF = 75% Global longitudinal LV strain (GLS) = -17.8%. Left ventricle strain data was reviewed by the physician and found to be accurate. Global longitudinal LV strain is normal however there is regional abnormality with apical sparing suggestive of amyloid heart disease. However there is also basal ptal hypertrophy suggestive of hypertrophic cardiomyopathy. Wall motion abnormality is also noted in the basal septum and cannot rule out ischemic component.Consider additional imaging such as cardiac MRI and further evaluation also for amyloid heart disease as clinically indicated. The left ventricular cavity is small in size. Left ventricular wall thickness is consistent with moderate to severe septal asymmetric hypertrophy. There is hypokinesis of the left ventricular basal septum. Left ventricular diastolic function is consistent with (grade II w/high LAP) pseudonormalization.    The left atrial cavity is mildly dilated.    No aortic valve regurgitation or stenosis is present. The aortic valve is abnormal in structure. There is mild thickening of the aortic valve.    There is mild, bileaflet mitral valve thickening present. Trace mitral valve regurgitation is present. No significant  mitral valve stenosis is present.    Moderate tricuspid valve regurgitation is present. Estimated right ventricular systolic pressure from tricuspid regurgitation is markedly elevated (>55 mmHg). Calculated right ventricular systolic pressure from tricuspid regurgitation is 74 mmHg.        Assessment & Plan   Assessment and Plan:         Acute UTI (urinary tract infection)    Type 2 diabetes mellitus with hyperglycemia, with long-term current use of insulin    CAD (coronary artery disease)    Hx of CABG    Chronic diastolic heart failure    CSA (central sleep apnea)    HTN (hypertension)    History of stroke    CKD (chronic kidney disease) stage 3, GFR 30-59 ml/min    Peripheral arterial disease    Sepsis    Hyperkalemia    Metabolic encephalopathy    AMENA (acute kidney injury)    Paroxysmal atrial fibrillation    ASSESSMENT:  AMENA likely prerenal ATN 2/2 urosepsis with hemodynamic changes; on CKD III etiology likely diabetic and hypertensive nephropathy with baseline sCr ~0.9-1.2 mg/dL  Hyperkalemia, now resolved  UTI, urine cultures with gram neg bacilli  Leukocytosis  Metabolic encephalopathy  PAD  Hx stroke  HTN  CHF  CAD  DM2  Chronic splenic vein thrombosis  Possible hepatocellular disease/cirrhosis  Likely benign subpleural nodule with bilateral lymphadenopathy  Hiatal hernia    Last TTE 10/5/22 with EF 66%, grade II DD    PLAN :     AMENA likely prerenal ATN 2/2 urosepsis with hemodynamic changes; on CKD III etiology likely diabetic and hypertensive nephropathy with baseline sCr ~0.9-1.2 mg/dL  Creatinine again worsened, BP with trends labile, some hypotension  BP improved, on midodrine 10 mg po tid and Metoprolol  Will initiate on dialysis. IR to place [facility]  Will order short, low flow dialysis for today. No UF   UOP on low side seem to be dense ATN pt sleepy but arouseable   Potassium is acceptable no significant acidosis noted.    Off of Bumex 0.5 mg daily, will continue to hold  CXR 6/11 with pulmonary  vasculature unremarkable, small mildly increased left basilar atelectasis or infiltrate  Followed by infectious disease, on vanc, monitor levels closely   Duplex venous US LUE with SVT  Cardiology following for new onset A-fib, evaluation noted  Avoid NSAIDs, nephrotoxic agents  We will follow and coordinate with team  DW family at length

## 2024-06-15 NOTE — CONSULTS
[facility]  [phone]  Dr. Jose Combs      Subjective   LOS: 16 days     Thank you for this consultation.  74-year-old male who is laying sleeping in bed.  Wife at bedside gives me history.  Apparently patient has been coughing excessively for the past 3 to 4 days and especially last night.  This morning he coughed up a small dime size clot of blood.  He has been on chronic anticoagulation.  He is currently receiving treatment with antibiotics for acute UTI and left lower extremity cellulitis.  Currently only on vancomycin and previously completed cefepime.  Wife reports no underlying lung disease.  Patient is a never smoker.  He has complex sleep apnea but noncompliant with his BiPAP device.  Has a sleep study scheduled but will have to be postponed as patient is in the hospital.  History coronary artery disease with previous CABG.  Has chronic cervical and lumbar spinal stenosis.  Uses a rollator to mobilize himself.  Also prior history of stroke, CKD 3, type 2 diabetes mellitus, HFpEF, chronic right hand contracture and chronic peripheral neuropathy.    Dilip Verma  reports that he does not currently use alcohol.,  reports that he quit smoking about 24 years ago. His smoking use included cigarettes. He started smoking about 40 years ago. He has a 12 pack-year smoking history. He has been exposed to tobacco smoke. He has never used smokeless tobacco.     Past Hx:  has a past medical history of AMENA (acute kidney injury) (12/03/2020), Alcoholism (1989), CORI positive, Anemia, Anxiety, Ataxia, Atypical chest pain (04/19/2022), Balance disorder, Carotid stenosis (3/28/2024), Cataract, Cervical radiculopathy (11/11/2019), Cervical spinal cord compression, Chronic diastolic (congestive) heart failure, Chronic kidney disease, Chronic pancreatitis, Closed left subtrochanteric femur fracture (12/01/2020), Closed nondisplaced intertrochanteric fracture of left femur (12/01/2020), Colon polyps,  Constipation, Contracture, right hand, Coronary artery disease, COVID-19 (07/2022), DDD (degenerative disc disease), cervical, Diabetes mellitus, type II, Diabetic retinopathy, Difficulty walking, Dysphagia, Elevated brain natriuretic peptide (BNP) level (08/2014), Elevated LFTs (03/2021), Erectile dysfunction, Fissure, anal (2022), Foot drop, Fuchs' corneal dystrophy of right eye, Glaucoma, History of alcohol abuse, Hyperlipidemia, Hypersomnia, Hypertension, Hypertensive urgency (02/25/2020), Insomnia, Kidney stones, LV dysfunction (06/2016), Lyme disease, Lymphadenopathy syndrome (05/2021), Macular edema, Myocardial infarction (11/05/2019), Myocardial infarction (07/12/2019), Neuropathy in diabetes, Non-celiac gluten sensitivity, Orthostasis, Osteoporosis, Oxygen dependent, PAD (peripheral artery disease), Paroxysmal atrial fibrillation (6/6/2024), PNA (pneumonia) (06/2016), Polyneuropathy, PTSD (post-traumatic stress disorder), Pulmonary hypertension, Pulmonary nodule, Senile ectropion of both lower eyelids (02/2022), Sepsis (12/16/2020), Sleep apnea, Spinal stenosis in cervical region, Stroke (2012), Syncope and collapse (07/06/2019), Urinary retention (04/05/2021), Vision loss, and Vitamin D deficiency.  Surg Hx:  has a past surgical history that includes Colonoscopy w/ polypectomy (N/A, 01/02/2015); Inguinal hernia repair (Bilateral); Cataract extraction (Left, 2014); cystoscopy bladder stone lithotripsy (N/A); Dental surgery (Bilateral); Cardiac catheterization (N/A, 07/15/2019); Cardiac catheterization (N/A, 07/15/2019); Cardiac catheterization (N/A, 07/15/2019); Cardiac catheterization (07/15/2019); Coronary artery bypass graft (N/A, 07/18/2019); Cardiac catheterization (N/A, 11/06/2019); Cardiac catheterization (N/A, 11/06/2019); Femur IM Nailing/Rodding (Left, 12/03/2020); Revision total hip arthroplasty (Left, 01/11/2022); TOENAIL EXCISION (06/2022); Vasectomy (N/A); Tonsillectomy (Bilateral); Cataract  extraction (Right, 11/2016); Cardiac surgery; Colonoscopy (N/A, 03/16/2023); and Incision and drainage perirectal abscess (N/A, 10/04/2023).  FH: family history includes Alcohol abuse in his brother and paternal grandfather; Arrhythmia in his mother; Cancer in his mother; Depression in his mother and sister; Glaucoma in his maternal grandmother; Heart attack in his paternal grandmother; Heart disease in his father and mother; Heart failure in his father; Hyperlipidemia in his father and mother; Hypertension in his father and mother; Kidney disease in his father; Lung disease in his paternal uncle; Mental illness in his mother; Migraines in his mother; Stroke in his maternal grandmother and paternal grandmother; Thyroid disease in his father and paternal uncle; Uterine cancer in his mother.  SH:  reports that he quit smoking about 24 years ago. His smoking use included cigarettes. He started smoking about 40 years ago. He has a 12 pack-year smoking history. He has been exposed to tobacco smoke. He has never used smokeless tobacco. He reports that he does not currently use alcohol. He reports that he does not use drugs.    Medications Prior to Admission   Medication Sig Dispense Refill Last Dose    aspirin 81 MG EC tablet Take 1 tablet by mouth Every Night.   5/29/2024    brimonidine (ALPHAGAN) 0.2 % ophthalmic solution Administer 1 drop to both eyes 2 (Two) Times a Day.   Past Week    bumetanide (BUMEX) 1 MG tablet Take 1 tablet by mouth Daily.   5/29/2024    Cholecalciferol (Vitamin D-3) 125 MCG (5000 UT) tablet Take 1 tablet by mouth Daily.   5/29/2024    DULoxetine (CYMBALTA) 30 MG capsule Take 1 capsule by mouth Every Night.   5/29/2024    Insulin Glargine, 2 Unit Dial, (TOUJEO) 300 UNIT/ML solution pen-injector injection Inject 24 Units under the skin into the appropriate area as directed Daily.   5/29/2024    insulin lispro (humaLOG) 100 UNIT/ML injection Inject 0-14 Units under the skin into the appropriate  area as directed 3 (Three) Times a Day Before Meals. (Patient taking differently: Inject 0-14 Units under the skin into the appropriate area as directed 3 (Three) Times a Day Before Meals. SLIDING SCALE  100-150 - 4 units  151-200 - 5 units  201-250 - 6 units  251-300 - 7 units  301-350 - 8 units  351-400 - 9 units  401+ - 10 units)  12 2024    latanoprost (XALATAN) 0.005 % ophthalmic solution Administer 1 drop to both eyes every night at bedtime.   Past Week    multivitamin with minerals (MULTIVITAMIN ADULTS PO) Take 1 tablet by mouth Daily.   2024    testosterone (ANDROGEL) 25 MG/2.5GM (1%) gel gel Place 25 mg on the skin as directed by provider 2 (Two) Times a Week.   Past Week    traMADol (ULTRAM) 50 MG tablet Take 1 tablet by mouth At Night As Needed.   2024    Magnesium 400 MG capsule Take 400 mg by mouth As Needed.   More than a month    sildenafil (REVATIO) 20 MG tablet Take 1 tablet by mouth As Needed.   More than a month     Allergies   Allergen Reactions    Ace Inhibitors Angioedema    Angiotensin Receptor Blockers Angioedema and Unknown (See Comments)     Angioneurotic edema    Dapagliflozin Other (See Comments)     UTI,  rectal abcess    Losartan Angioedema     Angioneurotic edema    Seroquel [Quetiapine] Hallucinations    Xanax [Alprazolam] Unknown - High Severity    Amlodipine Swelling    Ativan [Lorazepam] Hallucinations    Baclofen Hallucinations    Misc. Sulfonamide Containing Compounds Unknown (See Comments)    Minoxidil Confusion    Tetracycline Rash     bliisters in mouth         Review of Systems    Vital Signs past 24hrs  BP range: BP: (129-163)/(62-68) 129/68  Pulse range: Heart Rate:  [55-74] 55  Resp rate range: Resp:  [17-18] 18  Temp range: Temp (24hrs), Av.3 °F (36.8 °C), Min:98.2 °F (36.8 °C), Max:98.4 °F (36.9 °C)    Oxygen range: SpO2:  [92 %-97 %] 97 %; Flow (L/min):  [3] 3;   Device (Oxygen Therapy): nasal cannula  95.9 kg (211 lb 6.7 oz); Body mass index is 32.15  kg/m².  Net IO Since Admission: -3,320 mL [06/15/24 1404]      Mechanical Ventilator:     Adult male who is laying in bed.  Currently sleeping.  Looks comfortable on low-flow oxygen.  JVP not elevated trachea midline.  Lungs reveal somewhat diminished breath sounds with rhonchi on the left lung.  Percussion note is resonant.  Chest expansion equal.  Heart examination S1-S2 present rhythm regular no murmurs.  No edema lower extremities.  Abdomen is obese soft nontender bowel sounds present no liver spleen enlargement.  No peripheral cyanosis clubbing.  Neuro exam not done as patient asleep.  No obvious lymphadenopathy.    Results Review:    I have reviewed the laboratory and imaging data from current admission. My annotations are as noted in assessment and plan.  Result Review:  I have personally reviewed the results from the time of this admission to 6/15/2024 14:04 EDT and agree with these findings:  [x]  Laboratory list / accordion  [x]  Microbiology  [x]  Radiology  []  EKG/Telemetry   []  Cardiology/Vascular   []  Pathology  []  Old records  []  Other:      Medication Review:  I have reviewed the current MAR. My annotations are as noted in assessment and plan.    heparin, 10.4 Units/kg/hr, Last Rate: Stopped (06/15/24 1239)  Pharmacy to dose vancomycin,       Lines, Drains & Airways       Active LDAs       Name Placement date Placement time Site Days    Peripheral IV 06/09/24 1415 Anterior;Right;Upper Arm 06/09/24  1415  Arm  5    Urethral Catheter Straight-tip 16 Fr. 06/12/24  1433  -- 2                  Isolation status: No active isolations    Dietary Orders (From admission, onward)       Start     Ordered    06/15/24 1146  NPO Diet NPO Type: Strict NPO  Diet Effective Now        Question:  NPO Type  Answer:  Strict NPO    06/15/24 1145    06/11/24 1159  Dietary Nutrition Supplements Boost Glucose Control (Glucerna Shake)  Daily With Breakfast & Dinner      Question:  Select Supplement:  Answer:  Boost  Glucose Control (Glucerna Shake)    06/11/24 1159                    Isolation:  No active isolations    PCCM Problems  Cough  Minor hemoptysis  Chronic anticoagulation  Left lower lobe infiltrate  Dysphagia with modified consistency diet recommended  AMENA with CKD 3  Relevant Medical Diagnoses  Ongoing treatment for UTI and left lower extremity cellulitis  CAD with history CABG  Atrial fibrillation  Chronic HFpEF  Chronic cervical and lumbar stenosis  Type 2 diabetes mellitus      Plan of Treatment    Cough with minor hemoptysis.  Patient is on chronic anticoagulation.  X-ray yesterday showed increasing left lower lobe infiltrate and possible effusion.  Will repeat chest x-ray and depending on results may need CT chest.  Infectious etiology less likely as patient is recently completed antibiotics and remains on vancomycin at this time.  However will check a procalcitonin with morning labs though in the context of renal failure this may not be very useful.    Patient sounds congested.  Will add guaifenesin and nebs for now.    Hemoptysis likely as a result of excessive coughing and in the context of being on anticoagulation.  If it worsens then may need to further investigate with bronchoscopy etc.  For now no objections to continuing with anticoagulation.    Note dysphagia per speech evaluation 6/11/2024 and modified consistency diet recommended.  Infiltrates may be a result of aspiration.  In which case patient may require alternate routes of nutrition.    Note very low bicarb.  Likely due to worsening uremia.  However with somnolence will check a lactic acid and ABG also.    Spoke to wife at bedside.    Electronically signed by Jose Combs MD, 06/15/24, 2:04 PM EDT.      Part of this note may be an electronic transcription/translation of spoken language to printed text using the Dragon Dictation System.

## 2024-06-15 NOTE — PLAN OF CARE
Problem: Adult Inpatient Plan of Care  Goal: Plan of Care Review  Flowsheets (Taken 6/15/2024 0315)  Progress: no change  Plan of Care Reviewed With: patient  Outcome Evaluation: No acute changes. VSS. AOx2 to self and place. Wife @ bedside. Uncooperative @ times. No c/o pain or discomfort. Resting comfortably @ this time. Pt and wife make needs known. Q2hr turns. Bed locked and in lowest position. O2 in use @ 3L via NC. Side rails up x2. Call light within reach. Will continue to assess.  Goal: Absence of Hospital-Acquired Illness or Injury  Intervention: Identify and Manage Fall Risk  Flowsheets  Taken 6/15/2024 0304 by Lawanda Gaston PCT  Safety Promotion/Fall Prevention:   safety round/check completed   assistive device/personal items within reach  Taken 6/15/2024 0222 by Murphy Herndon, RN  Safety Promotion/Fall Prevention:   activity supervised   assistive device/personal items within reach   clutter free environment maintained   fall prevention program maintained   lighting adjusted   nonskid shoes/slippers when out of bed   room organization consistent   safety round/check completed  Taken 6/15/2024 0029 by Murphy Herndon, RN  Safety Promotion/Fall Prevention:   activity supervised   assistive device/personal items within reach   clutter free environment maintained   fall prevention program maintained   lighting adjusted   nonskid shoes/slippers when out of bed   room organization consistent   safety round/check completed  Taken 6/14/2024 2234 by Murphy Herndon, RN  Safety Promotion/Fall Prevention:   activity supervised   assistive device/personal items within reach   clutter free environment maintained   fall prevention program maintained   lighting adjusted   nonskid shoes/slippers when out of bed   safety round/check completed   room organization consistent  Taken 6/14/2024 2022 by Murphy Herndon, RN  Safety Promotion/Fall Prevention:   activity supervised   assistive device/personal items within reach   clutter  free environment maintained   fall prevention program maintained   lighting adjusted   nonskid shoes/slippers when out of bed   room organization consistent   safety round/check completed  Intervention: Prevent Skin Injury  Flowsheets  Taken 6/15/2024 0304 by Lawanda Gaston PCT  Body Position:   turned   left  Taken 6/15/2024 0222 by Murphy Herndon RN  Body Position:   tilted   right  Taken 6/15/2024 0029 by Murphy Herndon RN  Body Position:   tilted   left  Skin Protection:   adhesive use limited   incontinence pads utilized   transparent dressing maintained   tubing/devices free from skin contact  Taken 6/14/2024 2234 by Murphy Herndon RN  Body Position: supine  Taken 6/14/2024 2022 by Murphy Herndon RN  Body Position:   tilted   left  Skin Protection:   adhesive use limited   incontinence pads utilized   tubing/devices free from skin contact   transparent dressing maintained  Intervention: Prevent and Manage VTE (Venous Thromboembolism) Risk  Flowsheets  Taken 6/15/2024 0304 by Lawanda Gaston PCT  Activity Management: activity encouraged  Taken 6/15/2024 0222 by Murphy Herndon RN  Activity Management: activity encouraged  Taken 6/15/2024 0029 by Murphy Herndon RN  Activity Management: activity encouraged  Range of Motion:   active ROM (range of motion) encouraged   ROM (range of motion) performed  Taken 6/14/2024 2234 by Murphy Herndon RN  Activity Management: activity encouraged  Taken 6/14/2024 2022 by Murphy Herndon RN  Activity Management: activity encouraged  Taken 6/14/2024 0822 by Gala Nelson RN  VTE Prevention/Management: (heparin drip) other (see comments)  Intervention: Prevent Infection  Flowsheets (Taken 6/15/2024 0304 by Lawanda Gaston PCT)  Infection Prevention:   environmental surveillance performed   hand hygiene promoted   rest/sleep promoted   single patient room provided  Goal: Optimal Comfort and Wellbeing  Intervention: Monitor Pain and Promote Comfort  Flowsheets (Taken 6/14/2024 1805 by Omar  Gala, RN)  Pain Management Interventions:   care clustered   pain management plan reviewed with patient/caregiver   see MAR  Intervention: Provide Person-Centered Care  Flowsheets  Taken 6/15/2024 0029  Trust Relationship/Rapport:   care explained   choices provided   emotional support provided   empathic listening provided   questions answered   questions encouraged   reassurance provided   thoughts/feelings acknowledged  Taken 6/14/2024 2022  Trust Relationship/Rapport:   care explained   choices provided   emotional support provided   empathic listening provided   questions encouraged   questions answered   reassurance provided   thoughts/feelings acknowledged  Goal: Readiness for Transition of Care  Intervention: Mutually Develop Transition Plan  Flowsheets (Taken 5/30/2024 1344 by Nabila Ramsey, RN)  Equipment Needed After Discharge: none  Equipment Currently Used at Home:   shower chair   rollator   oxygen   ramp   wheelchair   lift device   grab bar   glucometer   commode   bipap  Anticipated Changes Related to Illness: inability to care for self  Transportation Anticipated: family or friend will provide  Concerns to be Addressed: adjustment to diagnosis/illness  Readmission Within the Last 30 Days: no previous admission in last 30 days  Patient/Family Anticipated Services at Transition:   skilled nursing   home health care  Patient/Family Anticipates Transition to:   home with family   home with help/services   inpatient rehabilitation facility     Problem: Diabetes Comorbidity  Goal: Blood Glucose Level Within Targeted Range  Intervention: Monitor and Manage Glycemia  Flowsheets  Taken 6/15/2024 0029  Glycemic Management: blood glucose monitored  Taken 6/14/2024 2022  Glycemic Management: blood glucose monitored     Problem: Heart Failure Comorbidity  Goal: Maintenance of Heart Failure Symptom Control  Intervention: Maintain Heart Failure-Management  Flowsheets (Taken 6/14/2024 1805 by Gala Nelson,  RN)  Medication Review/Management: medications reviewed     Problem: Hypertension Comorbidity  Goal: Blood Pressure in Desired Range  Intervention: Maintain Blood Pressure Management  Flowsheets (Taken 6/14/2024 1805 by Gala Nelson RN)  Medication Review/Management: medications reviewed     Problem: Skin Injury Risk Increased  Goal: Skin Health and Integrity  Intervention: Promote and Optimize Oral Intake  Flowsheets  Taken 6/15/2024 0029  Oral Nutrition Promotion:   medicated   rest periods promoted  Taken 6/14/2024 2022  Oral Nutrition Promotion:   medicated   rest periods promoted  Intervention: Optimize Skin Protection  Flowsheets  Taken 6/15/2024 0304 by Lawanda Gaston PCT  Head of Bed (HOB) Positioning: HOB at 20-30 degrees  Taken 6/15/2024 0222 by Murphy Herndon RN  Head of Bed (HOB) Positioning: HOB elevated  Taken 6/15/2024 0029 by Murphy Herndon RN  Pressure Reduction Techniques:   frequent weight shift encouraged   heels elevated off bed   pressure points protected  Head of Bed (HOB) Positioning: HOB elevated  Pressure Reduction Devices:   alternating pressure pump (ADD)   positioning supports utilized   pressure-redistributing mattress utilized  Skin Protection:   adhesive use limited   incontinence pads utilized   transparent dressing maintained   tubing/devices free from skin contact  Taken 6/14/2024 2234 by Murphy Herndon RN  Head of Bed (HOB) Positioning: HOB elevated  Taken 6/14/2024 2022 by Murphy Herndon RN  Pressure Reduction Techniques:   frequent weight shift encouraged   weight shift assistance provided  Head of Bed (HOB) Positioning: HOB elevated  Pressure Reduction Devices:   alternating pressure pump (ADD)   pressure-redistributing mattress utilized   positioning supports utilized  Skin Protection:   adhesive use limited   incontinence pads utilized   tubing/devices free from skin contact   transparent dressing maintained     Problem: Fall Injury Risk  Goal: Absence of Fall and Fall-Related  Injury  Intervention: Identify and Manage Contributors  Flowsheets  Taken 6/14/2024 1805 by Gala Nelson, RN  Medication Review/Management: medications reviewed  Taken 6/14/2024 0822 by Gala Nelson, RN  Self-Care Promotion:   independence encouraged   BADL personal objects within reach  Intervention: Promote Injury-Free Environment  Flowsheets  Taken 6/15/2024 0304 by Lawanda Gaston PCT  Safety Promotion/Fall Prevention:   safety round/check completed   assistive device/personal items within reach  Taken 6/15/2024 0222 by Murphy Herndon RN  Safety Promotion/Fall Prevention:   activity supervised   assistive device/personal items within reach   clutter free environment maintained   fall prevention program maintained   lighting adjusted   nonskid shoes/slippers when out of bed   room organization consistent   safety round/check completed  Taken 6/15/2024 0029 by Murphy Herndon, RN  Safety Promotion/Fall Prevention:   activity supervised   assistive device/personal items within reach   clutter free environment maintained   fall prevention program maintained   lighting adjusted   nonskid shoes/slippers when out of bed   room organization consistent   safety round/check completed  Taken 6/14/2024 2234 by Murphy Herndon, RN  Safety Promotion/Fall Prevention:   activity supervised   assistive device/personal items within reach   clutter free environment maintained   fall prevention program maintained   lighting adjusted   nonskid shoes/slippers when out of bed   safety round/check completed   room organization consistent  Taken 6/14/2024 2022 by Murphy Herndon, RN  Safety Promotion/Fall Prevention:   activity supervised   assistive device/personal items within reach   clutter free environment maintained   fall prevention program maintained   lighting adjusted   nonskid shoes/slippers when out of bed   room organization consistent   safety round/check completed     Problem: Restraint, Nonviolent  Goal: Absence of Harm or  Injury  Intervention: Implement Least Restrictive Safety Strategies  Recent Flowsheet Documentation  Taken 6/15/2024 0029 by Murphy Herndon RN  Diversional Activities: television  Taken 6/14/2024 2022 by Murphy Herndon RN  Diversional Activities: television  Intervention: Protect Dignity, Rights, and Personal Wellbeing  Recent Flowsheet Documentation  Taken 6/15/2024 0029 by Murphy Herndon RN  Trust Relationship/Rapport:   care explained   choices provided   emotional support provided   empathic listening provided   questions answered   questions encouraged   reassurance provided   thoughts/feelings acknowledged  Taken 6/14/2024 2022 by Murphy Herndon RN  Trust Relationship/Rapport:   care explained   choices provided   emotional support provided   empathic listening provided   questions encouraged   questions answered   reassurance provided   thoughts/feelings acknowledged  Intervention: Protect Skin and Joint Integrity  Recent Flowsheet Documentation  Taken 6/15/2024 0222 by Murphy Herndon RN  Body Position:   tilted   right  Taken 6/15/2024 0029 by Murphy Herndon RN  Body Position:   tilted   left  Range of Motion:   active ROM (range of motion) encouraged   ROM (range of motion) performed  Taken 6/14/2024 2234 by Murphy Herndon RN  Body Position: supine  Taken 6/14/2024 2022 by Murphy Herndon RN  Body Position:   tilted   left   Goal Outcome Evaluation:  Plan of Care Reviewed With: patient        Progress: no change  Outcome Evaluation: No acute changes. VSS. AOx2 to self and place. Wife @ bedside. Uncooperative @ times. No c/o pain or discomfort. Resting comfortably @ this time. Pt and wife make needs known. Q2hr turns. Bed locked and in lowest position. O2 in use @ 3L via NC. Side rails up x2. Call light within reach. Will continue to assess.

## 2024-06-16 LAB
ANION GAP SERPL CALCULATED.3IONS-SCNC: 14.5 MMOL/L (ref 5–15)
APTT PPP: 54.9 SECONDS (ref 22.7–35.4)
APTT PPP: 54.9 SECONDS (ref 22.7–35.4)
APTT PPP: 63.2 SECONDS (ref 22.7–35.4)
BASOPHILS # BLD AUTO: 0.02 10*3/MM3 (ref 0–0.2)
BASOPHILS NFR BLD AUTO: 0.3 % (ref 0–1.5)
BUN SERPL-MCNC: 44 MG/DL (ref 8–23)
BUN/CREAT SERPL: 9.1 (ref 7–25)
CALCIUM SPEC-SCNC: 8.1 MG/DL (ref 8.6–10.5)
CHLORIDE SERPL-SCNC: 101 MMOL/L (ref 98–107)
CO2 SERPL-SCNC: 20.5 MMOL/L (ref 22–29)
CREAT SERPL-MCNC: 4.81 MG/DL (ref 0.76–1.27)
DEPRECATED RDW RBC AUTO: 42.3 FL (ref 37–54)
EGFRCR SERPLBLD CKD-EPI 2021: 11.9 ML/MIN/1.73
EOSINOPHIL # BLD AUTO: 0.13 10*3/MM3 (ref 0–0.4)
EOSINOPHIL NFR BLD AUTO: 1.6 % (ref 0.3–6.2)
ERYTHROCYTE [DISTWIDTH] IN BLOOD BY AUTOMATED COUNT: 12.5 % (ref 12.3–15.4)
GLUCOSE BLDC GLUCOMTR-MCNC: 152 MG/DL (ref 70–130)
GLUCOSE BLDC GLUCOMTR-MCNC: 172 MG/DL (ref 70–130)
GLUCOSE BLDC GLUCOMTR-MCNC: 185 MG/DL (ref 70–130)
GLUCOSE BLDC GLUCOMTR-MCNC: 186 MG/DL (ref 70–130)
GLUCOSE SERPL-MCNC: 159 MG/DL (ref 65–99)
HCT VFR BLD AUTO: 31.4 % (ref 37.5–51)
HGB BLD-MCNC: 10.4 G/DL (ref 13–17.7)
IMM GRANULOCYTES # BLD AUTO: 0.03 10*3/MM3 (ref 0–0.05)
IMM GRANULOCYTES NFR BLD AUTO: 0.4 % (ref 0–0.5)
LYMPHOCYTES # BLD AUTO: 0.7 10*3/MM3 (ref 0.7–3.1)
LYMPHOCYTES NFR BLD AUTO: 8.8 % (ref 19.6–45.3)
MCH RBC QN AUTO: 30.9 PG (ref 26.6–33)
MCHC RBC AUTO-ENTMCNC: 33.1 G/DL (ref 31.5–35.7)
MCV RBC AUTO: 93.2 FL (ref 79–97)
MONOCYTES # BLD AUTO: 0.58 10*3/MM3 (ref 0.1–0.9)
MONOCYTES NFR BLD AUTO: 7.3 % (ref 5–12)
NEUTROPHILS NFR BLD AUTO: 6.51 10*3/MM3 (ref 1.7–7)
NEUTROPHILS NFR BLD AUTO: 81.6 % (ref 42.7–76)
NRBC BLD AUTO-RTO: 0 /100 WBC (ref 0–0.2)
PLATELET # BLD AUTO: 263 10*3/MM3 (ref 140–450)
PMV BLD AUTO: 10.5 FL (ref 6–12)
POTASSIUM SERPL-SCNC: 4.4 MMOL/L (ref 3.5–5.2)
RBC # BLD AUTO: 3.37 10*6/MM3 (ref 4.14–5.8)
SODIUM SERPL-SCNC: 136 MMOL/L (ref 136–145)
VANCOMYCIN SERPL-MCNC: 16.4 MCG/ML (ref 5–40)
WBC NRBC COR # BLD AUTO: 7.97 10*3/MM3 (ref 3.4–10.8)

## 2024-06-16 PROCEDURE — 94761 N-INVAS EAR/PLS OXIMETRY MLT: CPT

## 2024-06-16 PROCEDURE — 80048 BASIC METABOLIC PNL TOTAL CA: CPT

## 2024-06-16 PROCEDURE — 85025 COMPLETE CBC W/AUTO DIFF WBC: CPT | Performed by: INTERNAL MEDICINE

## 2024-06-16 PROCEDURE — 94799 UNLISTED PULMONARY SVC/PX: CPT

## 2024-06-16 PROCEDURE — 80202 ASSAY OF VANCOMYCIN: CPT | Performed by: INTERNAL MEDICINE

## 2024-06-16 PROCEDURE — 25010000002 HEPARIN (PORCINE) PER 1000 UNITS: Performed by: INTERNAL MEDICINE

## 2024-06-16 PROCEDURE — 25010000002 THIAMINE HCL 200 MG/2ML SOLUTION: Performed by: INTERNAL MEDICINE

## 2024-06-16 PROCEDURE — 25010000002 HEPARIN (PORCINE) 25000-0.45 UT/250ML-% SOLUTION: Performed by: INTERNAL MEDICINE

## 2024-06-16 PROCEDURE — 94664 DEMO&/EVAL PT USE INHALER: CPT

## 2024-06-16 PROCEDURE — 82948 REAGENT STRIP/BLOOD GLUCOSE: CPT

## 2024-06-16 PROCEDURE — 85730 THROMBOPLASTIN TIME PARTIAL: CPT | Performed by: INTERNAL MEDICINE

## 2024-06-16 RX ORDER — SODIUM CHLORIDE 0.9 % (FLUSH) 0.9 %
10 SYRINGE (ML) INJECTION EVERY 12 HOURS SCHEDULED
Status: DISCONTINUED | OUTPATIENT
Start: 2024-06-16 | End: 2024-06-25 | Stop reason: HOSPADM

## 2024-06-16 RX ORDER — SODIUM CHLORIDE 0.9 % (FLUSH) 0.9 %
10 SYRINGE (ML) INJECTION AS NEEDED
Status: DISCONTINUED | OUTPATIENT
Start: 2024-06-16 | End: 2024-06-25 | Stop reason: HOSPADM

## 2024-06-16 RX ORDER — SODIUM BICARBONATE 650 MG/1
650 TABLET ORAL 3 TIMES DAILY
Status: DISCONTINUED | OUTPATIENT
Start: 2024-06-16 | End: 2024-06-21

## 2024-06-16 RX ORDER — SODIUM CHLORIDE 9 MG/ML
40 INJECTION, SOLUTION INTRAVENOUS AS NEEDED
Status: DISCONTINUED | OUTPATIENT
Start: 2024-06-16 | End: 2024-06-25 | Stop reason: HOSPADM

## 2024-06-16 RX ADMIN — GUAIFENESIN 600 MG: 600 TABLET, EXTENDED RELEASE ORAL at 22:35

## 2024-06-16 RX ADMIN — TERAZOSIN HYDROCHLORIDE 1 MG: 1 CAPSULE ORAL at 22:35

## 2024-06-16 RX ADMIN — SODIUM BICARBONATE 650 MG: 650 TABLET, ORALLY DISINTEGRATING ORAL at 22:35

## 2024-06-16 RX ADMIN — DULOXETINE HYDROCHLORIDE 30 MG: 30 CAPSULE, DELAYED RELEASE ORAL at 22:35

## 2024-06-16 RX ADMIN — SENNOSIDES AND DOCUSATE SODIUM 2 TABLET: 50; 8.6 TABLET ORAL at 10:31

## 2024-06-16 RX ADMIN — Medication 10 ML: at 22:36

## 2024-06-16 RX ADMIN — HEPARIN SODIUM 2900 UNITS: 5000 INJECTION INTRAVENOUS; SUBCUTANEOUS at 16:41

## 2024-06-16 RX ADMIN — AMIODARONE HYDROCHLORIDE 200 MG: 200 TABLET ORAL at 10:31

## 2024-06-16 RX ADMIN — SODIUM BICARBONATE 650 MG: 650 TABLET, ORALLY DISINTEGRATING ORAL at 10:37

## 2024-06-16 RX ADMIN — SODIUM BICARBONATE 650 MG: 650 TABLET, ORALLY DISINTEGRATING ORAL at 16:41

## 2024-06-16 RX ADMIN — BRIMONIDINE TARTRATE 1 DROP: 2 SOLUTION OPHTHALMIC at 22:32

## 2024-06-16 RX ADMIN — GUAIFENESIN 600 MG: 600 TABLET, EXTENDED RELEASE ORAL at 10:37

## 2024-06-16 RX ADMIN — IPRATROPIUM BROMIDE AND ALBUTEROL SULFATE 3 ML: .5; 3 SOLUTION RESPIRATORY (INHALATION) at 21:15

## 2024-06-16 RX ADMIN — Medication 10 ML: at 10:32

## 2024-06-16 RX ADMIN — MIDODRINE HYDROCHLORIDE 10 MG: 5 TABLET ORAL at 13:02

## 2024-06-16 RX ADMIN — METOPROLOL TARTRATE 12.5 MG: 25 TABLET, FILM COATED ORAL at 10:31

## 2024-06-16 RX ADMIN — Medication 4 ML: at 07:02

## 2024-06-16 RX ADMIN — BRIMONIDINE TARTRATE 1 DROP: 2 SOLUTION OPHTHALMIC at 10:31

## 2024-06-16 RX ADMIN — Medication 1 APPLICATION: at 20:45

## 2024-06-16 RX ADMIN — Medication 10 ML: at 10:31

## 2024-06-16 RX ADMIN — MIDODRINE HYDROCHLORIDE 10 MG: 5 TABLET ORAL at 16:41

## 2024-06-16 RX ADMIN — Medication 4 ML: at 21:15

## 2024-06-16 RX ADMIN — ASPIRIN 81 MG: 81 TABLET, CHEWABLE ORAL at 10:30

## 2024-06-16 RX ADMIN — LATANOPROST 1 DROP: 50 SOLUTION OPHTHALMIC at 22:32

## 2024-06-16 RX ADMIN — IPRATROPIUM BROMIDE AND ALBUTEROL SULFATE 3 ML: .5; 3 SOLUTION RESPIRATORY (INHALATION) at 06:57

## 2024-06-16 RX ADMIN — IPRATROPIUM BROMIDE AND ALBUTEROL SULFATE 3 ML: .5; 3 SOLUTION RESPIRATORY (INHALATION) at 11:23

## 2024-06-16 RX ADMIN — THIAMINE HYDROCHLORIDE 100 MG: 100 INJECTION, SOLUTION INTRAMUSCULAR; INTRAVENOUS at 10:31

## 2024-06-16 RX ADMIN — METOPROLOL TARTRATE 12.5 MG: 25 TABLET, FILM COATED ORAL at 22:34

## 2024-06-16 RX ADMIN — OLANZAPINE 5 MG: 5 TABLET, FILM COATED ORAL at 22:35

## 2024-06-16 RX ADMIN — IPRATROPIUM BROMIDE AND ALBUTEROL SULFATE 3 ML: .5; 3 SOLUTION RESPIRATORY (INHALATION) at 15:31

## 2024-06-16 RX ADMIN — Medication 1 APPLICATION: at 10:31

## 2024-06-16 RX ADMIN — HEPARIN SODIUM 12.4 UNITS/KG/HR: 10000 INJECTION, SOLUTION INTRAVENOUS at 00:01

## 2024-06-16 RX ADMIN — HEPARIN SODIUM 12.4 UNITS/KG/HR: 10000 INJECTION, SOLUTION INTRAVENOUS at 02:24

## 2024-06-16 NOTE — PROGRESS NOTES
PROGRESS NOTE      Patient Name: Dilip Verma  : 1950  MRN: [id number]  Primary Care Physician: Fernando Camargo DO  Date of admission: 2024    Patient Care Team:  Fernando Camargo DO as PCP - General (Family Medicine)  Morris Cai MD as Consulting Physician (Sleep Medicine)  Michaela Hylton MD as Consulting Physician (Cardiology)  Hardy Banerjee MD as Consulting Physician (Ophthalmology)  Chula Christianson MD as Consulting Physician (Ophthalmology)  Jason Sherman MD (Endocrinology)  Perla Mayen MD as Consulting Physician (Nephrology)  Federico Hebert MD as Consulting Physician (Pulmonary Disease)  Niraj Ravi MD as Consulting Physician (Orthopedic Surgery)  Papa Velasco MD as Consulting Physician (Urology)  Terese Yost MD as Consulting Physician (Gastroenterology)  Aracelis Ball MD as Consulting Physician (Colon and Rectal Surgery)  [facility] as Primary Care Provider        Reason for Follow up:     AMENA, CKD III      Subjective:     Patient seen and examined, more awake, less confused  S/p dialysis yesterday    Review of Systems:         Constitutional: weakness.  HEENT:  No headache, otalgia, itchy eyes, nasal discharge or sore throat.  Cardiac:  No chest pain, dyspnea, orthopnea or PND.  Chest:  No cough, phlegm or wheezing.  Abdomen:  No abdominal pain, nausea or vomiting.  Neuro:  No focal weakness, abnormal movements or seizure-like activity.  :   No hematuria, no pyuria, no dysuria, no flank pain.  Extremities:  No  joint pains.  ROS was otherwise negative except as mentioned in the [place].    Social History:  reports that he quit smoking about 24 years ago. His smoking use included cigarettes. He started smoking about 40 years ago. He has a 12 pack-year smoking history. He has been exposed to tobacco smoke. He has never used smokeless tobacco. He reports that he does not currently use alcohol. He reports that he does not  use drugs.    Medications:  Prior to Admission medications    Medication Sig Start Date End Date Taking? Authorizing Provider   aspirin 81 MG EC tablet Take 1 tablet by mouth Every Night.   Yes Heri Rinaldi MD   brimonidine (ALPHAGAN) 0.2 % ophthalmic solution Administer 1 drop to both eyes 2 (Two) Times a Day.   Yes Heri Rinaldi MD   bumetanide (BUMEX) 1 MG tablet Take 1 tablet by mouth Daily.   Yes Heri Rinaldi MD   Cholecalciferol (Vitamin D-3) 125 MCG (5000 UT) tablet Take 1 tablet by mouth Daily.   Yes Heri Rinaldi MD   DULoxetine (CYMBALTA) 30 MG capsule Take 1 capsule by mouth Every Night. 1/16/24  Yes Heri Rinaldi MD   Insulin Glargine, 2 Unit Dial, (TOUJEO) 300 UNIT/ML solution pen-injector injection Inject 24 Units under the skin into the appropriate area as directed Daily.   Yes Heri Rinaldi MD   insulin lispro (humaLOG) 100 UNIT/ML injection Inject 0-14 Units under the skin into the appropriate area as directed 3 (Three) Times a Day Before Meals.  Patient taking differently: Inject 0-14 Units under the skin into the appropriate area as directed 3 (Three) Times a Day Before Meals. SLIDING SCALE  100-150 - 4 units  151-200 - 5 units  201-250 - 6 units  251-300 - 7 units  301-350 - 8 units  351-400 - 9 units  401+ - 10 units 12/7/20  Yes Amador Valverde MD   latanoprost (XALATAN) 0.005 % ophthalmic solution Administer 1 drop to both eyes every night at bedtime. 1/16/23  Yes Heri Rinaldi MD   multivitamin with minerals (MULTIVITAMIN ADULTS PO) Take 1 tablet by mouth Daily.   Yes Heri Rinaldi MD   testosterone (ANDROGEL) 25 MG/2.5GM (1%) gel gel Place 25 mg on the skin as directed by provider 2 (Two) Times a Week.   Yes Heri Rinaldi MD   traMADol (ULTRAM) 50 MG tablet Take 1 tablet by mouth At Night As Needed. 10/16/23  Yes Heri Rinaldi MD   Magnesium 400 MG capsule Take 400 mg by mouth As Needed.    [place]  MD Heri   sildenafil (REVATIO) 20 MG tablet Take 1 tablet by mouth As Needed.    Provider, MD Heri     Scheduled Meds:amiodarone, 200 mg, Oral, Q24H  [Held by provider] apixaban, 5 mg, Oral, Q12H  aspirin, 81 mg, Oral, Daily  brimonidine, 1 drop, Both Eyes, BID  DULoxetine, 30 mg, Oral, Daily  guaiFENesin, 600 mg, Oral, Q12H  insulin lispro, 2-9 Units, Subcutaneous, 4x Daily AC & at Bedtime  ipratropium-albuterol, 3 mL, Nebulization, 4x Daily - RT  latanoprost, 1 drop, Both Eyes, Nightly  Menthol-Zinc Oxide, 1 Application, Topical, Q12H  metoprolol tartrate, 12.5 mg, Oral, Q12H  midodrine, 10 mg, Oral, TID AC  OLANZapine, 5 mg, Oral, Nightly  senna-docusate sodium, 2 tablet, Oral, BID  sodium bicarbonate, 650 mg, Oral, TID  sodium chloride, 10 mL, Intravenous, Q12H  sodium chloride, 10 mL, Intravenous, Q12H  sodium chloride, 4 mL, Nebulization, BID - RT  terazosin, 1 mg, Oral, Nightly  thiamine, 100 mg, Intravenous, Daily  traMADol, 50 mg, Oral, Once  vancomycin, 500 mg, Intravenous, Once  Vancomycin Pharmacy Intermittent/Pulse Dosing, , Does not apply, Daily      Continuous Infusions:heparin, 10.4 Units/kg/hr, Last Rate: 13.4 Units/kg/hr (06/16/24 0725)  Pharmacy to dose vancomycin,           PRN Meds:  acetaminophen **OR** acetaminophen **OR** acetaminophen    senna-docusate sodium **AND** polyethylene glycol **AND** bisacodyl **AND** bisacodyl    dextrose    dextrose    glucagon (human recombinant)    heparin (porcine)    nitroglycerin    OLANZapine    ondansetron    Pharmacy to dose vancomycin    sodium chloride    sodium chloride    sodium chloride    sodium chloride  Allergies:    Allergies   Allergen Reactions    Ace Inhibitors Angioedema    Angiotensin Receptor Blockers Angioedema and Unknown (See Comments)     Angioneurotic edema    Dapagliflozin Other (See Comments)     UTI,  rectal abcess    Losartan Angioedema     Angioneurotic edema    Seroquel [Quetiapine] Hallucinations    Xanax  "[Alprazolam] Unknown - High Severity    Amlodipine Swelling    Ativan [Lorazepam] Hallucinations    Baclofen Hallucinations    Misc. Sulfonamide Containing Compounds Unknown (See Comments)    Minoxidil Confusion    Tetracycline Rash     bliisters in mouth         Objective   Exam:     Vital Signs  Temp:  [96.9 °F (36.1 °C)-98.4 °F (36.9 °C)] 97.3 °F (36.3 °C)  Heart Rate:  [58-70] 64  Resp:  [16-20] 18  BP: (105-121)/(49-56) 114/54  SpO2:  [92 %-100 %] 93 %  on  Flow (L/min):  [3] 3;   Device (Oxygen Therapy): humidified;nasal cannula  Body mass index is 32.15 kg/m².       Exam:     /54 (BP Location: Right arm, Patient Position: Lying)   Pulse 64   Temp 97.3 °F (36.3 °C) (Oral)   Resp 18   Ht 172.7 cm (68\")   Wt 95.9 kg (211 lb 6.7 oz)   SpO2 93%   BMI 32.15 kg/m²     General Appearance:    Appears chronically ill, no distress   Head:    Normocephalic, atraumatic   Eyes:     EOM's intact, sclerae anicteric        Ears:    TMs not observed   Nose:   Patent without discharge   Neck:   Supple, JVD   Lungs:     Clear to auscultation bilaterally, respiratory effort is normal   Chest wall:    No tenderness   Heart:    Regular rate and rhythm, S1 and S2 normal, no   rub    or gallop   Abdomen:     Soft, nontender, nondistended,  no masses, no organomegaly   Extremities:   No edema   Neurologic:  No focal deficits.  Speech is fluent.  Conversation is coherent.      Results Review:  I have personally reviewed most recent Data :  BMP @LABRCNT(creatinine:10)  CBC    Results from last 7 days   Lab Units 06/16/24  0546 06/15/24  0703 06/14/24  0655 06/13/24  0532 06/12/24  0639   WBC 10*3/mm3 7.97 7.52 8.80 9.76 13.69*   HEMOGLOBIN g/dL 10.4* 10.7* 10.9* 11.1* 12.1*   PLATELETS 10*3/mm3 263 268 243 209 229     CMP   Results from last 7 days   Lab Units 06/16/24  0546 06/15/24  0703 06/14/24  0655 06/13/24  0532 06/12/24  1859 06/12/24  1739 06/12/24  0639 06/11/24  0729 06/10/24  0701   SODIUM mmol/L 136 136 132* " 131*  --   --  132* 133* 136   POTASSIUM mmol/L 4.4 4.1 4.2 3.9  --   --  4.0 3.8 3.3*   CHLORIDE mmol/L 101 100 98 97*  --   --  97* 99 102   CO2 mmol/L 20.5* 20.9* 20.2* 21.5*  --   --  22.9 25.0 23.1   BUN mg/dL 44* 66* 54* 49*  --   --  38* 31* 23   CREATININE mg/dL 4.81* 6.93* 5.83* 5.26*  --   --  4.55* 3.39* 2.34*   GLUCOSE mg/dL 159* 143* 168* 149*  --   --  168* 213* 155*   ALBUMIN g/dL  --   --   --   --   --  2.7*  --   --   --    AMMONIA umol/L  --   --   --   --  31  --   --   --   --      ABG    Results from last 7 days   Lab Units 06/15/24  1613   PH, ARTERIAL pH units 7.296*   PCO2, ARTERIAL mm Hg 48.6*   PO2 ART mm Hg 62.1*   O2 SATURATION ART % 88.5*   BASE EXCESS ART mmol/L -3.0*       XR Chest Post CVA Port    Result Date: 6/15/2024  1. Since yesterday's portable chest x-ray on 6/14/2024 at 2:15 p.m., there is been interval placement of a right internal jugular central line-Shiley catheter and its distal tip projects over the superior vena cava in good position and no pneumothorax is seen. Otherwise, there has been no change when compared to yesterday's chest x-ray 6/14/2024 at 2:15 p.m.  2. The patient's had previous median sternotomy there is prominent blunting of the left lateral costophrenic angle and some hazy density over the inferior left hemithorax likely secondary to at least a small to moderate size up to moderate size left pleural effusion there is some dense airspace consolidation in the left lower lung zone left lung base likely compressive atelectasis associated with the left pleural effusion although I cannot exclude superimposed pneumonia at the left lower lobe or lung base and correlate clinically. No additional active disease is seen in the chest.  This report was finalized on 6/15/2024 5:23 PM by Dr. Jaquan Mendieta M.D on Workstation: [id number]      XR Chest 1 View    Result Date: 6/14/2024  As described.    This report was finalized on 6/14/2024 2:42 PM by Dr. Gabe SANCHEZ  CAITLIN Zimmer on Workstation: [id number]      XR Chest 1 View    Result Date: 6/11/2024  As described.    This report was finalized on 6/11/2024 2:46 PM by Dr. Gabe Zimmer M.D on Workstation: [id number]       Results for orders placed during the hospital encounter of 05/30/24    Adult Transthoracic Echo Complete W/ Cont if Necessary Per Protocol    Interpretation Summary    Left ventricular systolic function is hyperdynamic (EF > 70%). Calculated left ventricular EF = 75% Global longitudinal LV strain (GLS) = -17.8%. Left ventricle strain data was reviewed by the physician and found to be accurate. Global longitudinal LV strain is normal however there is regional abnormality with apical sparing suggestive of amyloid heart disease. However there is also basal ptal hypertrophy suggestive of hypertrophic cardiomyopathy. Wall motion abnormality is also noted in the basal septum and cannot rule out ischemic component.Consider additional imaging such as cardiac MRI and further evaluation also for amyloid heart disease as clinically indicated. The left ventricular cavity is small in size. Left ventricular wall thickness is consistent with moderate to severe septal asymmetric hypertrophy. There is hypokinesis of the left ventricular basal septum. Left ventricular diastolic function is consistent with (grade II w/high LAP) pseudonormalization.    The left atrial cavity is mildly dilated.    No aortic valve regurgitation or stenosis is present. The aortic valve is abnormal in structure. There is mild thickening of the aortic valve.    There is mild, bileaflet mitral valve thickening present. Trace mitral valve regurgitation is present. No significant mitral valve stenosis is present.    Moderate tricuspid valve regurgitation is present. Estimated right ventricular systolic pressure from tricuspid regurgitation is markedly elevated (>55 mmHg). Calculated right ventricular systolic pressure from tricuspid regurgitation is 74  mmHg.        Assessment & Plan   Assessment and Plan:         Acute UTI (urinary tract infection)    Type 2 diabetes mellitus with hyperglycemia, with long-term current use of insulin    CAD (coronary artery disease)    Hx of CABG    Chronic diastolic heart failure    CSA (central sleep apnea)    HTN (hypertension)    History of stroke    CKD (chronic kidney disease) stage 3, GFR 30-59 ml/min    Peripheral arterial disease    Sepsis    Hyperkalemia    Metabolic encephalopathy    AMENA (acute kidney injury)    Paroxysmal atrial fibrillation    ASSESSMENT:  AMENA likely prerenal ATN 2/2 urosepsis with hemodynamic changes; on CKD III etiology likely diabetic and hypertensive nephropathy with baseline sCr ~0.9-1.2 mg/dL  Hyperkalemia, now resolved  UTI, urine cultures with gram neg bacilli  Leukocytosis  Metabolic encephalopathy  PAD  Hx stroke  HTN  CHF  CAD  DM2  Chronic splenic vein thrombosis  Possible hepatocellular disease/cirrhosis  Likely benign subpleural nodule with bilateral lymphadenopathy  Hiatal hernia    Last TTE 10/5/22 with EF 66%, grade II DD    PLAN :     AMENA likely prerenal ATN 2/2 urosepsis with hemodynamic changes; on CKD III etiology likely diabetic and hypertensive nephropathy with baseline sCr ~0.9-1.2 mg/dL  Initiated on dialysis yesterday 6/15 due to worsening renal function. Tolerated well, ran even  Mental status improving  Will dialyze again tomorrow for 3 hours. No UF  BP improved, on midodrine 10 mg po tid and Metoprolol  UOP on low side seem to be dense ATN   Potassium is acceptable   Add PO sodium bicarbonate   Off of Bumex 0.5 mg daily, will continue to hold  CXR 6/11 with pulmonary vasculature unremarkable, small mildly increased left basilar atelectasis or infiltrate  Followed by infectious disease, on vanc, monitor levels closely   Duplex venous US LUE with SVT  Cardiology following for new onset A-fib, evaluation noted  Avoid NSAIDs, nephrotoxic agents.   We will follow and coordinate  with team  Monitor closely for renal recovery

## 2024-06-16 NOTE — PLAN OF CARE
Problem: Adult Inpatient Plan of Care  Goal: Plan of Care Review  Flowsheets (Taken 6/16/2024 0048)  Outcome Evaluation: No acute changes. VSS. AOx1 to self. Wife @ bedside. Uncooperative @ times. Pt speaks illogically. Word salad. No c/o pain or discomfort. Resting comfortably @ this time. Pt and wife make needs known. Dialysis done today. Nothing taken off. Q2hr turns. Pt and wife trying to mess with IV pump and trying to turn off bed alarm to get pt on side of bed. Educated pt on importance of not messing with IV pump and to stay in bed and call out if help is needed. Educated wife and pt that he is too weak to get up and that they need to work more with PT. Both pt and wife verbalized understanding. Heparin drip running per orders. Bed locked and in lowest position. IV pump locked. O2 in use @ 3L via NC. Side rails up x2. Call light within reach. Will continue to assess.  Goal: Absence of Hospital-Acquired Illness or Injury  Intervention: Identify and Manage Fall Risk  Flowsheets  Taken 6/16/2024 0047  Safety Promotion/Fall Prevention:   activity supervised   assistive device/personal items within reach   clutter free environment maintained   fall prevention program maintained   lighting adjusted   nonskid shoes/slippers when out of bed   room organization consistent   safety round/check completed  Taken 6/15/2024 2244  Safety Promotion/Fall Prevention:   activity supervised   assistive device/personal items within reach   clutter free environment maintained   fall prevention program maintained   lighting adjusted   nonskid shoes/slippers when out of bed   room organization consistent   safety round/check completed  Taken 6/15/2024 2000  Safety Promotion/Fall Prevention:   activity supervised   assistive device/personal items within reach   clutter free environment maintained   fall prevention program maintained   lighting adjusted   nonskid shoes/slippers when out of bed   safety round/check completed   room  organization consistent  Intervention: Prevent Skin Injury  Flowsheets  Taken 6/16/2024 0047  Body Position:   tilted   right  Taken 6/15/2024 2244  Body Position: supine  Taken 6/15/2024 2000  Body Position:   tilted   left  Skin Protection:   adhesive use limited   incontinence pads utilized   tubing/devices free from skin contact   transparent dressing maintained  Intervention: Prevent and Manage VTE (Venous Thromboembolism) Risk  Flowsheets  Taken 6/16/2024 0047 by Murphy Herndon RN  Activity Management: activity encouraged  Taken 6/15/2024 2244 by Murphy Herndon RN  Activity Management: activity encouraged  Taken 6/15/2024 2000 by Murphy Herndon RN  Activity Management: activity encouraged  Range of Motion:   active ROM (range of motion) encouraged   ROM (range of motion) performed  Taken 6/14/2024 0822 by Gala Nelson RN  VTE Prevention/Management: (heparin drip) other (see comments)  Intervention: Prevent Infection  Flowsheets (Taken 6/15/2024 1100 by Ag Mina Jr., PCT)  Infection Prevention: environmental surveillance performed  Goal: Optimal Comfort and Wellbeing  Intervention: Monitor Pain and Promote Comfort  Flowsheets (Taken 6/14/2024 1805 by Gala Nelson, RN)  Pain Management Interventions:   care clustered   pain management plan reviewed with patient/caregiver   see MAR  Intervention: Provide Person-Centered Care  Flowsheets (Taken 6/15/2024 2000)  Trust Relationship/Rapport:   care explained   choices provided   emotional support provided   empathic listening provided   questions answered   questions encouraged   reassurance provided   thoughts/feelings acknowledged  Goal: Readiness for Transition of Care  Intervention: Mutually Develop Transition Plan  Flowsheets (Taken 5/30/2024 1344 by Nabila Ramsey RN)  Equipment Needed After Discharge: none  Equipment Currently Used at Home:   shower chair   rollator   oxygen   ramp   wheelchair   lift device   grab bar   glucometer   commode    bipap  Anticipated Changes Related to Illness: inability to care for self  Transportation Anticipated: family or friend will provide  Concerns to be Addressed: adjustment to diagnosis/illness  Readmission Within the Last 30 Days: no previous admission in last 30 days  Patient/Family Anticipated Services at Transition:   skilled nursing   home health care  Patient/Family Anticipates Transition to:   home with family   home with help/services   inpatient rehabilitation facility     Problem: Diabetes Comorbidity  Goal: Blood Glucose Level Within Targeted Range  Intervention: Monitor and Manage Glycemia  Flowsheets (Taken 6/15/2024 0029)  Glycemic Management: blood glucose monitored     Problem: Heart Failure Comorbidity  Goal: Maintenance of Heart Failure Symptom Control  Intervention: Maintain Heart Failure-Management  Flowsheets (Taken 6/15/2024 2000)  Medication Review/Management: medications reviewed     Problem: Hypertension Comorbidity  Goal: Blood Pressure in Desired Range  Intervention: Maintain Blood Pressure Management  Flowsheets (Taken 6/15/2024 2000)  Medication Review/Management: medications reviewed     Problem: Skin Injury Risk Increased  Goal: Skin Health and Integrity  Intervention: Promote and Optimize Oral Intake  Flowsheets (Taken 6/15/2024 0029)  Oral Nutrition Promotion:   medicated   rest periods promoted  Intervention: Optimize Skin Protection  Flowsheets  Taken 6/16/2024 0047  Head of Bed (HOB) Positioning: HOB elevated  Taken 6/15/2024 2244  Head of Bed (HOB) Positioning: HOB elevated  Taken 6/15/2024 2000  Pressure Reduction Techniques:   frequent weight shift encouraged   heels elevated off bed   positioned off wounds   weight shift assistance provided  Head of Bed (HOB) Positioning: HOB elevated  Pressure Reduction Devices:   alternating pressure pump (ADD)   positioning supports utilized  Skin Protection:   adhesive use limited   incontinence pads utilized   tubing/devices free from skin  contact   transparent dressing maintained     Problem: Fall Injury Risk  Goal: Absence of Fall and Fall-Related Injury  Intervention: Identify and Manage Contributors  Flowsheets  Taken 6/15/2024 2000 by Murphy Herndon RN  Medication Review/Management: medications reviewed  Taken 6/14/2024 0822 by Gala Nelson, RN  Self-Care Promotion:   independence encouraged   BADL personal objects within reach  Intervention: Promote Injury-Free Environment  Flowsheets  Taken 6/16/2024 0047  Safety Promotion/Fall Prevention:   activity supervised   assistive device/personal items within reach   clutter free environment maintained   fall prevention program maintained   lighting adjusted   nonskid shoes/slippers when out of bed   room organization consistent   safety round/check completed  Taken 6/15/2024 2244  Safety Promotion/Fall Prevention:   activity supervised   assistive device/personal items within reach   clutter free environment maintained   fall prevention program maintained   lighting adjusted   nonskid shoes/slippers when out of bed   room organization consistent   safety round/check completed  Taken 6/15/2024 2000  Safety Promotion/Fall Prevention:   activity supervised   assistive device/personal items within reach   clutter free environment maintained   fall prevention program maintained   lighting adjusted   nonskid shoes/slippers when out of bed   safety round/check completed   room organization consistent     Problem: Restraint, Nonviolent  Goal: Absence of Harm or Injury  Intervention: Implement Least Restrictive Safety Strategies  Recent Flowsheet Documentation  Taken 6/15/2024 2000 by Murphy Herndon, RN  Diversional Activities: television  Intervention: Protect Dignity, Rights, and Personal Wellbeing  Recent Flowsheet Documentation  Taken 6/15/2024 2000 by Murphy Herndon, RN  Trust Relationship/Rapport:   care explained   choices provided   emotional support provided   empathic listening provided   questions answered    questions encouraged   reassurance provided   thoughts/feelings acknowledged  Intervention: Protect Skin and Joint Integrity  Recent Flowsheet Documentation  Taken 6/16/2024 0047 by Murphy Herndon RN  Body Position:   tilted   right  Taken 6/15/2024 2244 by Murphy Herndon RN  Body Position: supine  Taken 6/15/2024 2000 by Murphy Herndon RN  Body Position:   tilted   left  Range of Motion:   active ROM (range of motion) encouraged   ROM (range of motion) performed   Goal Outcome Evaluation:  Plan of Care Reviewed With: patient        Progress: no change  Outcome Evaluation: No acute changes. VSS. AOx1 to self. Wife @ bedside. Uncooperative @ times. Pt speaks illogically. Word salad. No c/o pain or discomfort. Resting comfortably @ this time. Pt and wife make needs known. Dialysis done today. Nothing taken off. Q2hr turns. Pt and wife trying to mess with IV pump and trying to turn off bed alarm to get pt on side of bed. Educated pt on importance of not messing with IV pump and to stay in bed and call out if help is needed. Educated wife and pt that he is too weak to get up and that they need to work more with PT. Both pt and wife verbalized understanding. Heparin drip running per orders. Bed locked and in lowest position. IV pump locked. O2 in use @ 3L via NC. Side rails up x2. Call light within reach. Will continue to assess.

## 2024-06-16 NOTE — PROGRESS NOTES
[facility]  [phone]  Dr. Jose Combs    Subjective:  LOS: 17    Chief Complaint: Cough    Awake and alert today.  Remains mildly confused.  Denies any respiratory issues.  Wife at bedside.  Reports no further hemoptysis.  States that cough is substantially improved with bronchodilators.    Objective   Vital Signs past 24hrs  Temp range: Temp (24hrs), Av.5 °F (36.4 °C), Min:96.9 °F (36.1 °C), Max:98.4 °F (36.9 °C)    BP range: BP: (105-121)/(49-56) 114/54  Pulse range: Heart Rate:  [58-70] 64  Resp rate range: Resp:  [16-20] 18  Device (Oxygen Therapy): humidified;nasal cannulaFlow (L/min):  [3] 3  Oxygen range:SpO2:  [92 %-100 %] 93 %   Mechanical Ventilator:     Physical Exam  Constitutional:       Appearance: He is obese.   Eyes:      Pupils: Pupils are equal, round, and reactive to light.   Cardiovascular:      Rate and Rhythm: Normal rate and regular rhythm.      Heart sounds: No murmur heard.  Pulmonary:      Effort: Pulmonary effort is normal.      Breath sounds: Normal breath sounds.   Abdominal:      General: Bowel sounds are normal.      Palpations: Abdomen is soft. There is no mass.      Tenderness: There is no abdominal tenderness.   Musculoskeletal:         General: No swelling.   Neurological:      Mental Status: He is alert.       Results Review:    I have reviewed the laboratory and imaging data since the last note by [facility] physician.  My annotations are noted in assessment and plan.      Result Review:  I have personally reviewed the results from last note by [facility] physician to 2024 14:16 EDT and agree with these findings:  [x]  Laboratory list / accordion  [x]  Microbiology  [x]  Radiology  []  EKG/Telemetry   []  Cardiology/Vascular   []  Pathology  []  Old records  []  Other:    Medication Review:  I have reviewed the current MAR.  My annotations are noted in assessment and plan.    amiodarone, 200 mg, Oral, Q24H  [Held by provider] apixaban, 5 mg, Oral, Q12H  aspirin,  81 mg, Oral, Daily  brimonidine, 1 drop, Both Eyes, BID  DULoxetine, 30 mg, Oral, Daily  guaiFENesin, 600 mg, Oral, Q12H  insulin lispro, 2-9 Units, Subcutaneous, 4x Daily AC & at Bedtime  ipratropium-albuterol, 3 mL, Nebulization, 4x Daily - RT  latanoprost, 1 drop, Both Eyes, Nightly  Menthol-Zinc Oxide, 1 Application, Topical, Q12H  metoprolol tartrate, 12.5 mg, Oral, Q12H  midodrine, 10 mg, Oral, TID AC  OLANZapine, 5 mg, Oral, Nightly  senna-docusate sodium, 2 tablet, Oral, BID  sodium bicarbonate, 650 mg, Oral, TID  sodium chloride, 10 mL, Intravenous, Q12H  sodium chloride, 10 mL, Intravenous, Q12H  sodium chloride, 4 mL, Nebulization, BID - RT  terazosin, 1 mg, Oral, Nightly  thiamine, 100 mg, Intravenous, Daily  traMADol, 50 mg, Oral, Once  vancomycin, 500 mg, Intravenous, Once  Vancomycin Pharmacy Intermittent/Pulse Dosing, , Does not apply, Daily        heparin, 10.4 Units/kg/hr, Last Rate: 13.4 Units/kg/hr (06/16/24 0725)  Pharmacy to dose vancomycin,       Lines, Drains & Airways       Active LDAs       Name Placement date Placement time Site Days    Peripheral IV 06/09/24 1415 Anterior;Right;Upper Arm 06/09/24  1415  Arm  7    Urethral Catheter Straight-tip 16 Fr. 06/12/24  1433  -- 3    Hemodialysis Cath Double with Pigtail 06/15/24  1530  Internal Jugular  less than 1                  Isolation status: No active isolations    Dietary Orders (From admission, onward)       Start     Ordered    06/15/24 1856  Diet: Regular/House, Diabetic, Cardiac; Healthy Heart (2-3 Na+); Consistent Carbohydrate; Texture: Mechanical Ground (NDD 2); Fluid Consistency: Thin (IDDSI 0)  Diet Effective Now        References:    Diet Order Crosswalk   Question Answer Comment   Diets: Regular/House    Diets: Diabetic    Diets: Cardiac    Cardiac Diet: Healthy Heart (2-3 Na+)    Diabetic Diet: Consistent Carbohydrate    Texture: Mechanical Ground (NDD 2)    Fluid Consistency: Thin (IDDSI 0)        06/15/24 1855    06/11/24  1159  Dietary Nutrition Supplements Boost Glucose Control (Glucerna Shake)  Daily With Breakfast & Dinner      Question:  Select Supplement:  Answer:  Boost Glucose Control (Glucerna Shake)    06/11/24 1159                    [facility] Problems  Cough  Minor hemoptysis  Chronic anticoagulation  Left lower lobe infiltrate  Dysphagia with modified consistency diet recommended  AMENA with CKD 3  Relevant Medical Diagnoses  Ongoing treatment for UTI and left lower extremity cellulitis  CAD with history CABG  Atrial fibrillation  Chronic HFpEF  Chronic cervical and lumbar stenosis  Type 2 diabetes mellitus    Plan of Treatment    Cough improved.  Continue bronchodilators.    Left lower lobe infiltrate remains the same.  Elevated procalcitonin but in the context of ESRD.  No other infective features and normal white count.  Currently on antibiotics per ID.    No further hemoptysis per the wife.  Can continue with heparin drip.    Remains on modified consistency diet for dysphagia.    ABG yesterday was consistent with uremia.  Lactic acid was normal.  Mild hypoxia is improved with 2 L oxygen.    Hypoxia should improve with hemodialysis.  Wean oxygen as tolerated.      Jose Combs MD  06/16/24  14:16 EDT      Part of this note may be an electronic transcription/translation of spoken language to printed text using the Dragon Dictation System.

## 2024-06-16 NOTE — PROGRESS NOTES
"[facility] Clinical Pharmacy Services: Vancomycin Monitoring Note    Dilip Verma is a 74 y.o. male who is on day 4/7 of pharmacy to dose vancomycin for SSTI.    Previous Vancomycin Dose: Intermittent pulse dosing   Updated Cultures and Sensitivities:   -5/30 Urine cx: serratia marcescens   -5/30 Blood cx (2/2): NGTD  -6/4 MRSA PCR: NEGative   Results from last 7 days   Lab Units 06/16/24  0546 06/15/24  0703 06/14/24  0655   VANCOMYCIN RM mcg/mL 16.40 19.80 20.90     Vitals/Labs  Ht: 172.7 cm (68\"); Wt: 95.9 kg (211 lb 6.7 oz)   Temp Readings from Last 1 Encounters:   06/16/24 97.3 °F (36.3 °C) (Oral)     Estimated Creatinine Clearance: 14.9 mL/min (A) (by C-G formula based on SCr of 4.81 mg/dL (H)).   CKD + AMENA    Results from last 7 days   Lab Units 06/16/24  0546 06/15/24  0703 06/14/24  0655   CREATININE mg/dL 4.81* 6.93* 5.83*   WBC 10*3/mm3 7.97 7.52 8.80     Assessment/Plan  Renal dysfunction progressed to the point of needing dialysis, a short session was completed yesterday. For unclear reasons the dose of vancomycin that was planned for after dialysis was never given. Random level this AM still managed to be within the therapeutic range at 16.4 mg/L. Per nephrology planning another dialysis session tomorrow.     Current Vancomycin Dose: Give vancomycin 500 mg x 1 now   Next Level Date and Time: Vanc Random on 6/17 with AM labs   We will continue to monitor patient changes and renal function     Thank you for involving pharmacy in this patient's care. Please contact pharmacy with any questions or concerns.       Cheyenne Gowers, [facility]  Clinical Pharmacist    "

## 2024-06-17 ENCOUNTER — HOME HEALTH ADMISSION (OUTPATIENT)
Dept: HOME HEALTH SERVICES | Facility: HOME HEALTHCARE | Age: 74
End: 2024-06-17
Payer: MEDICARE

## 2024-06-17 LAB
ANION GAP SERPL CALCULATED.3IONS-SCNC: 10 MMOL/L (ref 5–15)
APTT PPP: 35.9 SECONDS (ref 22.7–35.4)
APTT PPP: 71.4 SECONDS (ref 22.7–35.4)
BASOPHILS # BLD AUTO: 0.03 10*3/MM3 (ref 0–0.2)
BASOPHILS NFR BLD AUTO: 0.4 % (ref 0–1.5)
BUN SERPL-MCNC: 53 MG/DL (ref 8–23)
BUN/CREAT SERPL: 10.1 (ref 7–25)
CALCIUM SPEC-SCNC: 8 MG/DL (ref 8.6–10.5)
CHLORIDE SERPL-SCNC: 105 MMOL/L (ref 98–107)
CO2 SERPL-SCNC: 22 MMOL/L (ref 22–29)
CREAT SERPL-MCNC: 5.23 MG/DL (ref 0.76–1.27)
DEPRECATED RDW RBC AUTO: 45.6 FL (ref 37–54)
EGFRCR SERPLBLD CKD-EPI 2021: 10.8 ML/MIN/1.73
EOSINOPHIL # BLD AUTO: 0.16 10*3/MM3 (ref 0–0.4)
EOSINOPHIL NFR BLD AUTO: 2.3 % (ref 0.3–6.2)
ERYTHROCYTE [DISTWIDTH] IN BLOOD BY AUTOMATED COUNT: 13 % (ref 12.3–15.4)
GLUCOSE BLDC GLUCOMTR-MCNC: 105 MG/DL (ref 70–130)
GLUCOSE BLDC GLUCOMTR-MCNC: 125 MG/DL (ref 70–130)
GLUCOSE BLDC GLUCOMTR-MCNC: 137 MG/DL (ref 70–130)
GLUCOSE BLDC GLUCOMTR-MCNC: 151 MG/DL (ref 70–130)
GLUCOSE SERPL-MCNC: 141 MG/DL (ref 65–99)
HCT VFR BLD AUTO: 31.8 % (ref 37.5–51)
HGB BLD-MCNC: 10.1 G/DL (ref 13–17.7)
IMM GRANULOCYTES # BLD AUTO: 0.04 10*3/MM3 (ref 0–0.05)
IMM GRANULOCYTES NFR BLD AUTO: 0.6 % (ref 0–0.5)
LYMPHOCYTES # BLD AUTO: 1.12 10*3/MM3 (ref 0.7–3.1)
LYMPHOCYTES NFR BLD AUTO: 15.9 % (ref 19.6–45.3)
MCH RBC QN AUTO: 30.6 PG (ref 26.6–33)
MCHC RBC AUTO-ENTMCNC: 31.8 G/DL (ref 31.5–35.7)
MCV RBC AUTO: 96.4 FL (ref 79–97)
MONOCYTES # BLD AUTO: 0.73 10*3/MM3 (ref 0.1–0.9)
MONOCYTES NFR BLD AUTO: 10.3 % (ref 5–12)
NEUTROPHILS NFR BLD AUTO: 4.98 10*3/MM3 (ref 1.7–7)
NEUTROPHILS NFR BLD AUTO: 70.5 % (ref 42.7–76)
NRBC BLD AUTO-RTO: 0 /100 WBC (ref 0–0.2)
PLATELET # BLD AUTO: 250 10*3/MM3 (ref 140–450)
PMV BLD AUTO: 10.5 FL (ref 6–12)
POTASSIUM SERPL-SCNC: 4.5 MMOL/L (ref 3.5–5.2)
RBC # BLD AUTO: 3.3 10*6/MM3 (ref 4.14–5.8)
SODIUM SERPL-SCNC: 137 MMOL/L (ref 136–145)
VANCOMYCIN SERPL-MCNC: 13.2 MCG/ML (ref 5–40)
WBC NRBC COR # BLD AUTO: 7.06 10*3/MM3 (ref 3.4–10.8)

## 2024-06-17 PROCEDURE — 80048 BASIC METABOLIC PNL TOTAL CA: CPT

## 2024-06-17 PROCEDURE — 25010000002 VANCOMYCIN 1 G RECONSTITUTED SOLUTION 1 EACH VIAL: Performed by: INTERNAL MEDICINE

## 2024-06-17 PROCEDURE — 94761 N-INVAS EAR/PLS OXIMETRY MLT: CPT

## 2024-06-17 PROCEDURE — 94760 N-INVAS EAR/PLS OXIMETRY 1: CPT

## 2024-06-17 PROCEDURE — 25010000002 HEPARIN (PORCINE) PER 1000 UNITS: Performed by: INTERNAL MEDICINE

## 2024-06-17 PROCEDURE — 25010000002 HEPARIN (PORCINE) 25000-0.45 UT/250ML-% SOLUTION: Performed by: INTERNAL MEDICINE

## 2024-06-17 PROCEDURE — 94799 UNLISTED PULMONARY SVC/PX: CPT

## 2024-06-17 PROCEDURE — 25810000003 SODIUM CHLORIDE 0.9 % SOLUTION 250 ML FLEX CONT: Performed by: INTERNAL MEDICINE

## 2024-06-17 PROCEDURE — 85025 COMPLETE CBC W/AUTO DIFF WBC: CPT | Performed by: INTERNAL MEDICINE

## 2024-06-17 PROCEDURE — 80202 ASSAY OF VANCOMYCIN: CPT | Performed by: INTERNAL MEDICINE

## 2024-06-17 PROCEDURE — 85730 THROMBOPLASTIN TIME PARTIAL: CPT | Performed by: INTERNAL MEDICINE

## 2024-06-17 PROCEDURE — 25010000002 THIAMINE HCL 200 MG/2ML SOLUTION: Performed by: INTERNAL MEDICINE

## 2024-06-17 PROCEDURE — 97530 THERAPEUTIC ACTIVITIES: CPT

## 2024-06-17 PROCEDURE — 97110 THERAPEUTIC EXERCISES: CPT

## 2024-06-17 PROCEDURE — 94664 DEMO&/EVAL PT USE INHALER: CPT

## 2024-06-17 PROCEDURE — 82948 REAGENT STRIP/BLOOD GLUCOSE: CPT

## 2024-06-17 RX ADMIN — Medication 4 ML: at 07:10

## 2024-06-17 RX ADMIN — Medication 10 ML: at 23:12

## 2024-06-17 RX ADMIN — HEPARIN SODIUM 4000 UNITS: 5000 INJECTION INTRAVENOUS; SUBCUTANEOUS at 14:05

## 2024-06-17 RX ADMIN — SODIUM BICARBONATE 650 MG: 650 TABLET, ORALLY DISINTEGRATING ORAL at 16:45

## 2024-06-17 RX ADMIN — THIAMINE HYDROCHLORIDE 100 MG: 100 INJECTION, SOLUTION INTRAMUSCULAR; INTRAVENOUS at 12:17

## 2024-06-17 RX ADMIN — SODIUM BICARBONATE 650 MG: 650 TABLET, ORALLY DISINTEGRATING ORAL at 23:09

## 2024-06-17 RX ADMIN — GUAIFENESIN 600 MG: 600 TABLET, EXTENDED RELEASE ORAL at 23:09

## 2024-06-17 RX ADMIN — AMIODARONE HYDROCHLORIDE 200 MG: 200 TABLET ORAL at 12:17

## 2024-06-17 RX ADMIN — IPRATROPIUM BROMIDE AND ALBUTEROL SULFATE 3 ML: .5; 3 SOLUTION RESPIRATORY (INHALATION) at 21:01

## 2024-06-17 RX ADMIN — IPRATROPIUM BROMIDE AND ALBUTEROL SULFATE 3 ML: .5; 3 SOLUTION RESPIRATORY (INHALATION) at 07:05

## 2024-06-17 RX ADMIN — METOPROLOL TARTRATE 12.5 MG: 25 TABLET, FILM COATED ORAL at 12:17

## 2024-06-17 RX ADMIN — Medication 1 APPLICATION: at 12:17

## 2024-06-17 RX ADMIN — IPRATROPIUM BROMIDE AND ALBUTEROL SULFATE 3 ML: .5; 3 SOLUTION RESPIRATORY (INHALATION) at 15:02

## 2024-06-17 RX ADMIN — ASPIRIN 81 MG: 81 TABLET, CHEWABLE ORAL at 12:16

## 2024-06-17 RX ADMIN — Medication 1 APPLICATION: at 23:10

## 2024-06-17 RX ADMIN — OLANZAPINE 5 MG: 5 TABLET, FILM COATED ORAL at 23:09

## 2024-06-17 RX ADMIN — Medication 4 ML: at 21:02

## 2024-06-17 RX ADMIN — LATANOPROST 1 DROP: 50 SOLUTION OPHTHALMIC at 23:09

## 2024-06-17 RX ADMIN — TRAMADOL HYDROCHLORIDE 50 MG: 50 TABLET ORAL at 00:42

## 2024-06-17 RX ADMIN — HEPARIN SODIUM 14.4 UNITS/KG/HR: 10000 INJECTION, SOLUTION INTRAVENOUS at 00:53

## 2024-06-17 RX ADMIN — SODIUM CHLORIDE 1000 MG: 9 INJECTION, SOLUTION INTRAVENOUS at 16:48

## 2024-06-17 RX ADMIN — DULOXETINE HYDROCHLORIDE 30 MG: 30 CAPSULE, DELAYED RELEASE ORAL at 23:09

## 2024-06-17 RX ADMIN — BRIMONIDINE TARTRATE 1 DROP: 2 SOLUTION OPHTHALMIC at 23:09

## 2024-06-17 RX ADMIN — GUAIFENESIN 600 MG: 600 TABLET, EXTENDED RELEASE ORAL at 12:16

## 2024-06-17 NOTE — PLAN OF CARE
Goal Outcome Evaluation:  Plan of Care Reviewed With: patient        Progress: no change  Outcome Evaluation: Pt seen for PT tx today. Pt did improve with bed mobility needing ModAX1  and stood twice with Max assist of 2. Pt leaning to left side and cued pt to shift his weight to the right. Pt had difficulty taking any meaningful steps despite cues. Pt was able to scoot to HOB with MinAX2/ModA with max cues needed. Will continue to follow pt and progress as able.

## 2024-06-17 NOTE — PROGRESS NOTES
"[facility] Clinical Pharmacy Services: Vancomycin Monitoring Note    Dilip Verma is a 74 y.o. male who is on day 5 of pharmacy to dose vancomycin for LLE cellulitis.    Previous Vancomycin Dose: 1750 mg IV once on 6/12 at 2309, intermittent dosing,    Updated Cultures and Sensitivities:   5/30: BCx-negative  5/30: UCx-50K Serratia marcescens  6/4: MRSA screen-negative    Results from last 7 days   Lab Units 06/17/24  0559 06/16/24  0546 06/15/24  0703   VANCOMYCIN RM mcg/mL 13.20 16.40 19.80     Vitals/Labs  Ht: 172.7 cm (68\"); Wt: 95.9 kg (211 lb 6.7 oz)   Temp Readings from Last 1 Encounters:   06/16/24 98.4 °F (36.9 °C) (Oral)     Dialysis-no schedule set in hospital yet    Results from last 7 days   Lab Units 06/17/24  0600 06/17/24  0559 06/16/24  0546 06/15/24  0703   CREATININE mg/dL  --  5.23* 4.81* 6.93*   WBC 10*3/mm3 7.06  --  7.97 7.52     Assessment/Plan    Level came back low at 13.2 mcg/mL, but patient has refused the last two doses of vancomycin. Will order a dose of 1000 mg IV once to be given after dialysis. Goal pre-IHD level is 15-20 mcg/mL. Continue intermittent dosing.  Next Level Date and Time: Vanc Random is scheduled for tomorrow morning 6/18 with am labs.  We will continue to monitor patient changes and dialysis schedule.      Thank you for involving pharmacy in this patient's care. Please contact pharmacy with any questions or concerns.       Daisy Slater, Pharm.D., [facility]   Clinical Pharmacist  "

## 2024-06-17 NOTE — PROGRESS NOTES
Infectious Diseases Progress Note    Tino Nagel MD     [facility]  Los: 18 days  Patient Identification:  Name: Dilip Verma  Age: 74 y.o.  Sex: male  :  1950  MRN: [id number]         Primary Care Physician: Fernando Camargo,         Subjective: Awake interactive pleasantly confused.  Interval History: See consultation note.  2024 MRSA screen is negative.  Objective:    Scheduled Meds:amiodarone, 200 mg, Oral, Q24H  [Held by provider] apixaban, 5 mg, Oral, Q12H  aspirin, 81 mg, Oral, Daily  brimonidine, 1 drop, Both Eyes, BID  DULoxetine, 30 mg, Oral, Daily  guaiFENesin, 600 mg, Oral, Q12H  insulin lispro, 2-9 Units, Subcutaneous, 4x Daily AC & at Bedtime  ipratropium-albuterol, 3 mL, Nebulization, 4x Daily - RT  latanoprost, 1 drop, Both Eyes, Nightly  Menthol-Zinc Oxide, 1 Application, Topical, Q12H  metoprolol tartrate, 12.5 mg, Oral, Q12H  midodrine, 10 mg, Oral, TID AC  OLANZapine, 5 mg, Oral, Nightly  senna-docusate sodium, 2 tablet, Oral, BID  sodium bicarbonate, 650 mg, Oral, TID  sodium chloride, 10 mL, Intravenous, Q12H  sodium chloride, 10 mL, Intravenous, Q12H  sodium chloride, 4 mL, Nebulization, BID - RT  terazosin, 1 mg, Oral, Nightly  thiamine, 100 mg, Intravenous, Daily  vancomycin, 500 mg, Intravenous, Once  Vancomycin Pharmacy Intermittent/Pulse Dosing, , Does not apply, Daily      Continuous Infusions:heparin, 10.4 Units/kg/hr, Last Rate: 14.4 Units/kg/hr (24 0053)  Pharmacy to dose vancomycin,             Vital signs in last 24 hours:  Temp:  [97.3 °F (36.3 °C)-98.4 °F (36.9 °C)] 98.4 °F (36.9 °C)  Heart Rate:  [61-70] 61  Resp:  [18-20] 18  BP: (114-150)/(54-70) 119/58    Intake/Output:    Intake/Output Summary (Last 24 hours) at 2024 0702  Last data filed at 2024 2235  Gross per 24 hour   Intake 460 ml   Output 500 ml   Net -40 ml           Exam:  /58 (BP Location: Right arm, Patient Position: Lying)   Pulse 61   Temp 98.4 °F (36.9 °C)  "(Oral)   Resp 18   Ht 172.7 cm (68\")   Wt 95.9 kg (211 lb 6.7 oz)   SpO2 94%   BMI 32.15 kg/m²   Patient is examined using the personal protective equipment as per guidelines from infection control for this particular patient as enacted.  Hand washing was performed before and after patient interaction.  General Appearance: Confused interactive and follows command.                          Head:    Normocephalic, without obvious abnormality, atraumatic                           Eyes:    PERRL, conjunctivae/corneas clear, EOM's intact, both eyes                         Throat:   Lips, tongue, gums normal; oral mucosa pink and moist                           Neck:   Supple, symmetrical, trachea midline, no JVD                         Lungs:    Clear to auscultation bilaterally, respirations unlabored                 Chest Wall:    No tenderness or deformity                          Heart:  S1-S2 regular                  Abdomen:   Soft nontender                 Extremities: Improvement in swelling and redness of the left arm.  Left lower extremity edema and swelling is much improved.                    Neurologic: No acute distress       Data Review:    I reviewed the patient's new clinical results.  Results from last 7 days   Lab Units 06/17/24  0600 06/16/24  0546 06/15/24  0703 06/14/24  0655 06/13/24  0532 06/12/24  0639   WBC 10*3/mm3 7.06 7.97 7.52 8.80 9.76 13.69*   HEMOGLOBIN g/dL 10.1* 10.4* 10.7* 10.9* 11.1* 12.1*   PLATELETS 10*3/mm3 250 263 268 243 209 229     Results from last 7 days   Lab Units 06/17/24  0559 06/16/24  0546 06/15/24  0703 06/14/24  0655 06/13/24  0532 06/12/24  0639 06/11/24  0729   SODIUM mmol/L 137 136 136 132* 131* 132* 133*   POTASSIUM mmol/L 4.5 4.4 4.1 4.2 3.9 4.0 3.8   CHLORIDE mmol/L 105 101 100 98 97* 97* 99   CO2 mmol/L 22.0 20.5* 20.9* 20.2* 21.5* 22.9 25.0   BUN mg/dL 53* 44* 66* 54* 49* 38* 31*   CREATININE mg/dL 5.23* 4.81* 6.93* 5.83* 5.26* 4.55* 3.39*   CALCIUM mg/dL " 8.0* 8.1* 8.2* 8.5* 8.3* 8.2* 8.0*   GLUCOSE mg/dL 141* 159* 143* 168* 149* 168* 213*     Microbiology Results (last 10 days)       ** No results found for the last 240 hours. **            Assessment:    Acute UTI (urinary tract infection)    Type 2 diabetes mellitus with hyperglycemia, with long-term current use of insulin    CAD (coronary artery disease)    Hx of CABG    Chronic diastolic heart failure    CSA (central sleep apnea)    HTN (hypertension)    History of stroke    CKD (chronic kidney disease) stage 3, GFR 30-59 ml/min    Peripheral arterial disease    Sepsis    Hyperkalemia    Metabolic encephalopathy    AMENA (acute kidney injury)    Paroxysmal atrial fibrillation  1-metabolic encephalopathy due to  2-systemic infection as a result of urinary tract infection as well as evolving sepsis traumatic cellulitis of the left leg.  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     Recommendations/Discussions:  Despite the fact that he is negative MRSA screen his right arm-continue with IV vancomycin for phlebitis and secondary cellulitis of the left arm as it is showing improvement.  Continue antibiotic treatment for 7 days with continued local care elevation and monitoring and local care of the scabbed wound on the medial aspect of the midforearm.  Continue with left arm elevation  Continue supportive care per primary team.  Tino Nagel MD  6/17/2024  07:02 EDT    Parts of this note may be an electronic transcription/translation of spoken language to printed text using the Dragon dictation system.

## 2024-06-17 NOTE — PROGRESS NOTES
PROGRESS NOTE      Patient Name: Dilip Verma  : 1950  MRN: [id number]  Primary Care Physician: Fernando Camargo DO  Date of admission: 2024    Patient Care Team:  Fernando Camargo DO as PCP - General (Family Medicine)  Morris Cai MD as Consulting Physician (Sleep Medicine)  Michaela Hylton MD as Consulting Physician (Cardiology)  Hardy Banerjee MD as Consulting Physician (Ophthalmology)  Chula Christianson MD as Consulting Physician (Ophthalmology)  Jason Sherman MD (Endocrinology)  Perla Mayen MD as Consulting Physician (Nephrology)  Federico Hebert MD as Consulting Physician (Pulmonary Disease)  Niraj Ravi MD as Consulting Physician (Orthopedic Surgery)  Papa Velasco MD as Consulting Physician (Urology)  Terese Yost MD as Consulting Physician (Gastroenterology)  Aracelis Ball MD as Consulting Physician (Colon and Rectal Surgery)  [facility] as Primary Care Provider        Reason for Follow up:     AMENA, CKD III      Subjective:     Patient seen and examined, more awake, still  confused  S/p dialysis yesterday    Review of Systems:         Constitutional: weakness.  HEENT:  No headache, otalgia, itchy eyes, nasal discharge or sore throat.  Cardiac:  No chest pain, dyspnea, orthopnea or PND.  Chest:  No cough, phlegm or wheezing.  Abdomen:  No abdominal pain, nausea or vomiting.  Neuro:  No focal weakness, abnormal movements or seizure-like activity.  :   No hematuria, no pyuria, no dysuria, no flank pain.  Extremities:  No  joint pains.  ROS was otherwise negative except as mentioned in the [place].    Social History:  reports that he quit smoking about 24 years ago. His smoking use included cigarettes. He started smoking about 40 years ago. He has a 12 pack-year smoking history. He has been exposed to tobacco smoke. He has never used smokeless tobacco. He reports that he does not currently use alcohol. He reports that he does not  use drugs.    Medications:  Prior to Admission medications    Medication Sig Start Date End Date Taking? Authorizing Provider   aspirin 81 MG EC tablet Take 1 tablet by mouth Every Night.   Yes Heri Rinaldi MD   brimonidine (ALPHAGAN) 0.2 % ophthalmic solution Administer 1 drop to both eyes 2 (Two) Times a Day.   Yes Heri Rinaldi MD   bumetanide (BUMEX) 1 MG tablet Take 1 tablet by mouth Daily.   Yes Heri Rinaldi MD   Cholecalciferol (Vitamin D-3) 125 MCG (5000 UT) tablet Take 1 tablet by mouth Daily.   Yes Heri Rinaldi MD   DULoxetine (CYMBALTA) 30 MG capsule Take 1 capsule by mouth Every Night. 1/16/24  Yes Heri Rinaldi MD   Insulin Glargine, 2 Unit Dial, (TOUJEO) 300 UNIT/ML solution pen-injector injection Inject 24 Units under the skin into the appropriate area as directed Daily.   Yes Heri Rinaldi MD   insulin lispro (humaLOG) 100 UNIT/ML injection Inject 0-14 Units under the skin into the appropriate area as directed 3 (Three) Times a Day Before Meals.  Patient taking differently: Inject 0-14 Units under the skin into the appropriate area as directed 3 (Three) Times a Day Before Meals. SLIDING SCALE  100-150 - 4 units  151-200 - 5 units  201-250 - 6 units  251-300 - 7 units  301-350 - 8 units  351-400 - 9 units  401+ - 10 units 12/7/20  Yes Amador Valverde MD   latanoprost (XALATAN) 0.005 % ophthalmic solution Administer 1 drop to both eyes every night at bedtime. 1/16/23  Yes Heri Rinaldi MD   multivitamin with minerals (MULTIVITAMIN ADULTS PO) Take 1 tablet by mouth Daily.   Yes Heri Rinaldi MD   testosterone (ANDROGEL) 25 MG/2.5GM (1%) gel gel Place 25 mg on the skin as directed by provider 2 (Two) Times a Week.   Yes Heri Rinaldi MD   traMADol (ULTRAM) 50 MG tablet Take 1 tablet by mouth At Night As Needed. 10/16/23  Yes Heri Rinaldi MD   Magnesium 400 MG capsule Take 400 mg by mouth As Needed.    [place]  MD Heri   sildenafil (REVATIO) 20 MG tablet Take 1 tablet by mouth As Needed.    Provider, MD Heri     Scheduled Meds:amiodarone, 200 mg, Oral, Q24H  [Held by provider] apixaban, 5 mg, Oral, Q12H  aspirin, 81 mg, Oral, Daily  brimonidine, 1 drop, Both Eyes, BID  DULoxetine, 30 mg, Oral, Daily  guaiFENesin, 600 mg, Oral, Q12H  insulin lispro, 2-9 Units, Subcutaneous, 4x Daily AC & at Bedtime  ipratropium-albuterol, 3 mL, Nebulization, 4x Daily - RT  latanoprost, 1 drop, Both Eyes, Nightly  Menthol-Zinc Oxide, 1 Application, Topical, Q12H  metoprolol tartrate, 12.5 mg, Oral, Q12H  midodrine, 10 mg, Oral, TID AC  OLANZapine, 5 mg, Oral, Nightly  senna-docusate sodium, 2 tablet, Oral, BID  sodium bicarbonate, 650 mg, Oral, TID  sodium chloride, 10 mL, Intravenous, Q12H  sodium chloride, 10 mL, Intravenous, Q12H  sodium chloride, 4 mL, Nebulization, BID - RT  terazosin, 1 mg, Oral, Nightly  thiamine, 100 mg, Intravenous, Daily  vancomycin, 1,000 mg, Intravenous, Once  Vancomycin Pharmacy Intermittent/Pulse Dosing, , Does not apply, Daily      Continuous Infusions:heparin, 10.4 Units/kg/hr, Last Rate: 17.4 Units/kg/hr (06/17/24 1406)  Pharmacy to dose vancomycin,           PRN Meds:  acetaminophen **OR** acetaminophen **OR** acetaminophen    senna-docusate sodium **AND** polyethylene glycol **AND** bisacodyl **AND** bisacodyl    dextrose    dextrose    glucagon (human recombinant)    heparin (porcine)    nitroglycerin    OLANZapine    ondansetron    Pharmacy to dose vancomycin    sodium chloride    sodium chloride    sodium chloride    sodium chloride  Allergies:    Allergies   Allergen Reactions    Ace Inhibitors Angioedema    Angiotensin Receptor Blockers Angioedema and Unknown (See Comments)     Angioneurotic edema    Dapagliflozin Other (See Comments)     UTI,  rectal abcess    Losartan Angioedema     Angioneurotic edema    Seroquel [Quetiapine] Hallucinations    Xanax [Alprazolam] Unknown - High  "Severity    Amlodipine Swelling    Ativan [Lorazepam] Hallucinations    Baclofen Hallucinations    Misc. Sulfonamide Containing Compounds Unknown (See Comments)    Minoxidil Confusion    Tetracycline Rash     bliisters in mouth         Objective   Exam:     Vital Signs  Temp:  [97 °F (36.1 °C)-98.4 °F (36.9 °C)] 97.9 °F (36.6 °C)  Heart Rate:  [61-73] 62  Resp:  [18-20] 20  BP: (119-150)/(58-80) 143/62  SpO2:  [92 %-94 %] 94 %  on  Flow (L/min):  [2-3] 2;   Device (Oxygen Therapy): nasal cannula;humidified  Body mass index is 32.15 kg/m².       Exam:     /62 (BP Location: Left arm, Patient Position: Lying)   Pulse 62   Temp 97.9 °F (36.6 °C) (Oral)   Resp 20   Ht 172.7 cm (68\")   Wt 95.9 kg (211 lb 6.7 oz)   SpO2 94%   BMI 32.15 kg/m²     General Appearance:    Appears chronically ill, no distress   Head:    Normocephalic, atraumatic   Eyes:     EOM's intact, sclerae anicteric        Ears:    TMs not observed   Nose:   Patent without discharge   Neck:   Supple, JVD   Lungs:     Clear to auscultation bilaterally, respiratory effort is normal   Chest wall:    No tenderness   Heart:    Regular rate and rhythm, S1 and S2 normal, no   rub    or gallop   Abdomen:     Soft, nontender, nondistended,  no masses, no organomegaly   Extremities:   No edema   Neurologic:  No focal deficits.  Speech is fluent.  Conversation is coherent.      Results Review:  I have personally reviewed most recent Data :  BMP @LABRCNT(creatinine:10)  CBC    Results from last 7 days   Lab Units 06/17/24  0600 06/16/24  0546 06/15/24  0703 06/14/24  0655 06/13/24  0532 06/12/24  0639   WBC 10*3/mm3 7.06 7.97 7.52 8.80 9.76 13.69*   HEMOGLOBIN g/dL 10.1* 10.4* 10.7* 10.9* 11.1* 12.1*   PLATELETS 10*3/mm3 250 263 268 243 209 229     CMP   Results from last 7 days   Lab Units 06/17/24  0559 06/16/24  0546 06/15/24  0703 06/14/24  0655 06/13/24  0532 06/12/24  1859 06/12/24  1739 06/12/24  0639 06/11/24  0729   SODIUM mmol/L 137 136 136 " 132* 131*  --   --  132* 133*   POTASSIUM mmol/L 4.5 4.4 4.1 4.2 3.9  --   --  4.0 3.8   CHLORIDE mmol/L 105 101 100 98 97*  --   --  97* 99   CO2 mmol/L 22.0 20.5* 20.9* 20.2* 21.5*  --   --  22.9 25.0   BUN mg/dL 53* 44* 66* 54* 49*  --   --  38* 31*   CREATININE mg/dL 5.23* 4.81* 6.93* 5.83* 5.26*  --   --  4.55* 3.39*   GLUCOSE mg/dL 141* 159* 143* 168* 149*  --   --  168* 213*   ALBUMIN g/dL  --   --   --   --   --   --  2.7*  --   --    AMMONIA umol/L  --   --   --   --   --  31  --   --   --      ABG    Results from last 7 days   Lab Units 06/15/24  1613   PH, ARTERIAL pH units 7.296*   PCO2, ARTERIAL mm Hg 48.6*   PO2 ART mm Hg 62.1*   O2 SATURATION ART % 88.5*   BASE EXCESS ART mmol/L -3.0*       XR Chest Post CVA Port    Result Date: 6/15/2024  1. Since yesterday's portable chest x-ray on 6/14/2024 at 2:15 p.m., there is been interval placement of a right internal jugular central line-Shiley catheter and its distal tip projects over the superior vena cava in good position and no pneumothorax is seen. Otherwise, there has been no change when compared to yesterday's chest x-ray 6/14/2024 at 2:15 p.m.  2. The patient's had previous median sternotomy there is prominent blunting of the left lateral costophrenic angle and some hazy density over the inferior left hemithorax likely secondary to at least a small to moderate size up to moderate size left pleural effusion there is some dense airspace consolidation in the left lower lung zone left lung base likely compressive atelectasis associated with the left pleural effusion although I cannot exclude superimposed pneumonia at the left lower lobe or lung base and correlate clinically. No additional active disease is seen in the chest.  This report was finalized on 6/15/2024 5:23 PM by Dr. Jaquan Mendieta M.D on Workstation: [id number]      XR Chest 1 View    Result Date: 6/14/2024  As described.    This report was finalized on 6/14/2024 2:42 PM by Dr. Gabe SANCHEZ  CAITLIN Zimmer on Workstation: [id number]      XR Chest 1 View    Result Date: 6/11/2024  As described.    This report was finalized on 6/11/2024 2:46 PM by Dr. Gabe Zimmer M.D on Workstation: [id number]       Results for orders placed during the hospital encounter of 05/30/24    Adult Transthoracic Echo Complete W/ Cont if Necessary Per Protocol    Interpretation Summary    Left ventricular systolic function is hyperdynamic (EF > 70%). Calculated left ventricular EF = 75% Global longitudinal LV strain (GLS) = -17.8%. Left ventricle strain data was reviewed by the physician and found to be accurate. Global longitudinal LV strain is normal however there is regional abnormality with apical sparing suggestive of amyloid heart disease. However there is also basal ptal hypertrophy suggestive of hypertrophic cardiomyopathy. Wall motion abnormality is also noted in the basal septum and cannot rule out ischemic component.Consider additional imaging such as cardiac MRI and further evaluation also for amyloid heart disease as clinically indicated. The left ventricular cavity is small in size. Left ventricular wall thickness is consistent with moderate to severe septal asymmetric hypertrophy. There is hypokinesis of the left ventricular basal septum. Left ventricular diastolic function is consistent with (grade II w/high LAP) pseudonormalization.    The left atrial cavity is mildly dilated.    No aortic valve regurgitation or stenosis is present. The aortic valve is abnormal in structure. There is mild thickening of the aortic valve.    There is mild, bileaflet mitral valve thickening present. Trace mitral valve regurgitation is present. No significant mitral valve stenosis is present.    Moderate tricuspid valve regurgitation is present. Estimated right ventricular systolic pressure from tricuspid regurgitation is markedly elevated (>55 mmHg). Calculated right ventricular systolic pressure from tricuspid regurgitation is 74  mmHg.        Assessment & Plan   Assessment and Plan:         Acute UTI (urinary tract infection)    Type 2 diabetes mellitus with hyperglycemia, with long-term current use of insulin    CAD (coronary artery disease)    Hx of CABG    Chronic diastolic heart failure    CSA (central sleep apnea)    HTN (hypertension)    History of stroke    CKD (chronic kidney disease) stage 3, GFR 30-59 ml/min    Peripheral arterial disease    Sepsis    Hyperkalemia    Metabolic encephalopathy    AMENA (acute kidney injury)    Paroxysmal atrial fibrillation    ASSESSMENT:  AMENA likely prerenal ATN 2/2 urosepsis with hemodynamic changes; on CKD III etiology likely diabetic and hypertensive nephropathy with baseline sCr ~0.9-1.2 mg/dL  Hyperkalemia, now resolved  UTI, urine cultures with gram neg bacilli  Leukocytosis  Metabolic encephalopathy  PAD  Hx stroke  HTN  CHF  CAD  DM2  Chronic splenic vein thrombosis  Possible hepatocellular disease/cirrhosis  Likely benign subpleural nodule with bilateral lymphadenopathy  Hiatal hernia    Last TTE 10/5/22 with EF 66%, grade II DD    PLAN :     AMENA likely prerenal ATN 2/2 urosepsis with hemodynamic changes; on CKD III etiology likely diabetic and hypertensive nephropathy with baseline sCr ~0.9-1.2 mg/dL  Initiated on dialysis yesterday 6/15 due to worsening renal function. Tolerated well, ran even  Seen on HD today   Mental status improving  HD today for 3 HR   BP improved, on midodrine 10 mg po tid and Metoprolol  UOP on 650 cc due to  to be dense ATN   If not better consider TDC in 1-2 days   Potassium is acceptable   Add PO sodium bicarbonate   Off of Bumex 0.5 mg daily, will continue to hold  CXR 6/11 with pulmonary vasculature unremarkable, small mildly increased left basilar atelectasis or infiltrate  Followed by infectious disease, on vanc, monitor levels closely   Duplex venous US LUE with SVT  Cardiology following for new onset A-fib, evaluation noted  Avoid NSAIDs, nephrotoxic agents.    We will follow and coordinate with team  Monitor closely for renal recovery

## 2024-06-17 NOTE — PLAN OF CARE
Goal Outcome Evaluation:      Pt Overall cooperative this shift but needed re orientated each time to situation and place. Wife present at bedside. Wife educated to not touch IV pump and bed alarm settings due to pt safety issues. Wife verbalized understanding. Safety rounds completed, frequent turning and repositioning for heels to be elevated off of bed.

## 2024-06-17 NOTE — PLAN OF CARE
Goal Outcome Evaluation:  Plan of Care Reviewed With: patient        Progress: no change  Outcome Evaluation: vss. telelmetry monitor, sr. confused. 2 lpm via nc. hd today, no fluid removal. q2 turn. light catheter care. bed alarm. wife at bedside. heparin drip, ptt monitored, not currently therapeutic. iv vanc given. wound care.

## 2024-06-17 NOTE — PROGRESS NOTES
Name: Dilip Verma ADMIT: 2024   : 1950  PCP: Fernando Camargo DO    MRN: [id number] LOS: 17 days   AGE/SEX: 74 y.o. male  ROOM: [facility]     Subjective   Subjective   Patient is seen at bedside, no new complaints.       Objective   Objective   Vital Signs  Temp:  [96.9 °F (36.1 °C)-98.4 °F (36.9 °C)] 97.7 °F (36.5 °C)  Heart Rate:  [58-70] 63  Resp:  [18-20] 18  BP: (105-146)/(49-67) 146/67  SpO2:  [90 %-99 %] 93 %  on  Flow (L/min):  [3] 3;   Device (Oxygen Therapy): humidified  Body mass index is 32.15 kg/m².  Physical Exam  Vitals and nursing note reviewed.   Constitutional:       General: He is not in acute distress.     Appearance: He is ill-appearing (chronically). He is not toxic-appearing or diaphoretic.   HENT:      Head: Normocephalic and atraumatic.      Nose: Nose normal.      Mouth/Throat:      Mouth: Mucous membranes are moist.      Pharynx: Oropharynx is clear.   Eyes:      Conjunctiva/sclera: Conjunctivae normal.      Pupils: Pupils are equal, round, and reactive to light.   Cardiovascular:      Rate and Rhythm: Normal rate and regular rhythm.      Pulses: Normal pulses.   Pulmonary:      Effort: Pulmonary effort is normal.      Breath sounds: Normal breath sounds.   Abdominal:      General: Bowel sounds are normal.      Palpations: Abdomen is soft.      Tenderness: There is no abdominal tenderness.   Musculoskeletal:         General: Swelling (1-2+ BLE) present.      Cervical back: Neck supple.   Skin:     General: Skin is warm and dry.      Copied text material from yesterday's note has been reviewed for appropriate changes and remains accurate as of 24.        Results Review     I reviewed the patient's new clinical results.  Results from last 7 days   Lab Units 24  0546 06/15/24  0703 24  0655 24  0532   WBC 10*3/mm3 7.97 7.52 8.80 9.76   HEMOGLOBIN g/dL 10.4* 10.7* 10.9* 11.1*   PLATELETS 10*3/mm3 263 268 243 209     Results from last 7 days   Lab Units  06/16/24  0546 06/15/24  0703 06/14/24  0655 06/13/24  0532   SODIUM mmol/L 136 136 132* 131*   POTASSIUM mmol/L 4.4 4.1 4.2 3.9   CHLORIDE mmol/L 101 100 98 97*   CO2 mmol/L 20.5* 20.9* 20.2* 21.5*   BUN mg/dL 44* 66* 54* 49*   CREATININE mg/dL 4.81* 6.93* 5.83* 5.26*   GLUCOSE mg/dL 159* 143* 168* 149*   EGFR mL/min/1.73 11.9* 7.7* 9.5* 10.7*     Results from last 7 days   Lab Units 06/12/24  1739   ALBUMIN g/dL 2.7*     Results from last 7 days   Lab Units 06/16/24  0546 06/15/24  0703 06/14/24  0655 06/13/24  0532 06/12/24  1739   CALCIUM mg/dL 8.1* 8.2* 8.5* 8.3*  --    ALBUMIN g/dL  --   --   --   --  2.7*     Results from last 7 days   Lab Units 06/15/24  1611   PROCALCITONIN ng/mL 0.46*   LACTATE mmol/L 0.8     Glucose   Date/Time Value Ref Range Status   06/16/2024 2109 172 (H) 70 - 130 mg/dL Final   06/16/2024 1626 185 (H) 70 - 130 mg/dL Final   06/16/2024 1112 186 (H) 70 - 130 mg/dL Final   06/16/2024 0548 152 (H) 70 - 130 mg/dL Final   06/15/2024 2126 119 70 - 130 mg/dL Final   06/15/2024 1640 156 (H) 70 - 130 mg/dL Final   06/15/2024 1610 187 (H) 70 - 130 mg/dL Final       XR Chest Post CVA Port    Result Date: 6/15/2024  1. Since yesterday's portable chest x-ray on 6/14/2024 at 2:15 p.m., there is been interval placement of a right internal jugular central line-Shiley catheter and its distal tip projects over the superior vena cava in good position and no pneumothorax is seen. Otherwise, there has been no change when compared to yesterday's chest x-ray 6/14/2024 at 2:15 p.m.  2. The patient's had previous median sternotomy there is prominent blunting of the left lateral costophrenic angle and some hazy density over the inferior left hemithorax likely secondary to at least a small to moderate size up to moderate size left pleural effusion there is some dense airspace consolidation in the left lower lung zone left lung base likely compressive atelectasis associated with the left pleural effusion although  I cannot exclude superimposed pneumonia at the left lower lobe or lung base and correlate clinically. No additional active disease is seen in the chest.  This report was finalized on 6/15/2024 5:23 PM by Dr. Jaquan Mendieta M.D on Workstation: [id number]       I have personally reviewed all medications:  Scheduled Medications  amiodarone, 200 mg, Oral, Q24H  [Held by provider] apixaban, 5 mg, Oral, Q12H  aspirin, 81 mg, Oral, Daily  brimonidine, 1 drop, Both Eyes, BID  DULoxetine, 30 mg, Oral, Daily  guaiFENesin, 600 mg, Oral, Q12H  insulin lispro, 2-9 Units, Subcutaneous, 4x Daily AC & at Bedtime  ipratropium-albuterol, 3 mL, Nebulization, 4x Daily - RT  latanoprost, 1 drop, Both Eyes, Nightly  Menthol-Zinc Oxide, 1 Application, Topical, Q12H  metoprolol tartrate, 12.5 mg, Oral, Q12H  midodrine, 10 mg, Oral, TID AC  OLANZapine, 5 mg, Oral, Nightly  senna-docusate sodium, 2 tablet, Oral, BID  sodium bicarbonate, 650 mg, Oral, TID  sodium chloride, 10 mL, Intravenous, Q12H  sodium chloride, 10 mL, Intravenous, Q12H  sodium chloride, 4 mL, Nebulization, BID - RT  terazosin, 1 mg, Oral, Nightly  thiamine, 100 mg, Intravenous, Daily  traMADol, 50 mg, Oral, Once  vancomycin, 500 mg, Intravenous, Once  Vancomycin Pharmacy Intermittent/Pulse Dosing, , Does not apply, Daily    Infusions  heparin, 10.4 Units/kg/hr, Last Rate: 14.4 Units/kg/hr (06/16/24 1641)  Pharmacy to dose vancomycin,     Diet  Diet: Regular/House, Diabetic, Cardiac; Healthy Heart (2-3 Na+); Consistent Carbohydrate; Texture: Mechanical Ground (NDD 2); Fluid Consistency: Thin (IDDSI 0)    I have personally reviewed:  [x]  Laboratory   [x]  Microbiology   [x]  Radiology   [x]  EKG/Telemetry  [x]  Cardiology/Vascular   []  Pathology    []  Records       Assessment/Plan     Active Hospital Problems    Diagnosis  POA    **Acute UTI (urinary tract infection) [N39.0]  Yes    Paroxysmal atrial fibrillation [I48.0]  Yes    AMENA (acute kidney injury) [N17.9]  Yes     Sepsis [A41.9]  Yes    Hyperkalemia [E87.5]  Yes    Metabolic encephalopathy [G93.41]  Yes    Peripheral arterial disease [I73.9]  Yes    History of stroke [Z86.73]  Not Applicable    CKD (chronic kidney disease) stage 3, GFR 30-59 ml/min [N18.30]  Yes    HTN (hypertension) [I10]  Yes    CSA (central sleep apnea) [G47.31]  Yes    Chronic diastolic heart failure [I50.32]  Yes    Hx of CABG [Z95.1]  Not Applicable    CAD (coronary artery disease) [I25.10]  Yes    Type 2 diabetes mellitus with hyperglycemia, with long-term current use of insulin [E11.65, Z79.4]  Not Applicable      Resolved Hospital Problems   No resolved problems to display.       74 y.o. male admitted with Acute UTI (urinary tract infection).    Assessment and plan  Acute UTI/LLE Cellulitis  - urine culture grew Serratia  - completed 9d of abx-cefepime was discontinued 6/8 due to high suspicion of this causing toxic encephalopathy.  - doing well off of abx  - appreciate ID recs     HTNCAD/PAD/New Onset Afib (PAF)/Chronic Diastolic CHF  - BP acceptable, no anginal symptoms, in NSR  - continue on current regimen  - on AC with eliquis but not taking PO now due to severe encephalopathy-hold eliquis and ask pharmacy to dose lovenox  - appreciate cardiology recs     Type 2 DM  - had hypoglycemia, improved off of lantus  - continue D5W, getting thiamine also  - continue coverage with ssi/hypoglycemia protocol     AMENA  -Nephrology on board, follow management recommendations.     Hypoxia  - CXR noted, improved after additional dose of bumex  - monitor and encourage pulmonary toilet as able     Delirium/Toxic Encephalopathy  - continue on scheduled and PRN zyprexa  - he is current with palliatus at home and takes PRN tramadol, this has been restarted     Hypokalemia  - replace K+       Tony Negrete MD  [facility]  06/16/24  21:35 EDT

## 2024-06-17 NOTE — PROGRESS NOTES
[facility]  [phone]  Dr. Jose Combs    Subjective:  LOS: 18    Chief Complaint: Cough    Awake and alert but remains mildly confused.  Cough remains improved and no further hemoptysis.    Objective   Vital Signs past 24hrs  Temp range: Temp (24hrs), Av.7 °F (36.5 °C), Min:97 °F (36.1 °C), Max:98.4 °F (36.9 °C)    BP range: BP: (119-150)/(58-80) 143/62  Pulse range: Heart Rate:  [61-73] 70  Resp rate range: Resp:  [18-20] 18  Device (Oxygen Therapy): nasal cannulaFlow (L/min):  [2-3] 2  Oxygen range:SpO2:  [90 %-94 %] 94 %   Mechanical Ventilator:     Physical Exam  Constitutional:       Appearance: He is obese.   Eyes:      Pupils: Pupils are equal, round, and reactive to light.   Cardiovascular:      Rate and Rhythm: Normal rate and regular rhythm.      Heart sounds: No murmur heard.  Pulmonary:      Effort: Pulmonary effort is normal.      Breath sounds: Normal breath sounds.   Abdominal:      General: Bowel sounds are normal.      Palpations: Abdomen is soft. There is no mass.      Tenderness: There is no abdominal tenderness.   Musculoskeletal:         General: No swelling.   Neurological:      Mental Status: He is alert.       Results Review:    I have reviewed the laboratory and imaging data since the last note by [facility] physician.  My annotations are noted in assessment and plan.      Result Review:  I have personally reviewed the results from last note by [facility] physician to 2024 14:49 EDT and agree with these findings:  [x]  Laboratory list / accordion  [x]  Microbiology  [x]  Radiology  []  EKG/Telemetry   []  Cardiology/Vascular   []  Pathology  []  Old records  []  Other:    Medication Review:  I have reviewed the current MAR.  My annotations are noted in assessment and plan.    amiodarone, 200 mg, Oral, Q24H  [Held by provider] apixaban, 5 mg, Oral, Q12H  aspirin, 81 mg, Oral, Daily  brimonidine, 1 drop, Both Eyes, BID  DULoxetine, 30 mg, Oral, Daily  guaiFENesin, 600 mg,  Oral, Q12H  insulin lispro, 2-9 Units, Subcutaneous, 4x Daily AC & at Bedtime  ipratropium-albuterol, 3 mL, Nebulization, 4x Daily - RT  latanoprost, 1 drop, Both Eyes, Nightly  Menthol-Zinc Oxide, 1 Application, Topical, Q12H  metoprolol tartrate, 12.5 mg, Oral, Q12H  midodrine, 10 mg, Oral, TID AC  OLANZapine, 5 mg, Oral, Nightly  senna-docusate sodium, 2 tablet, Oral, BID  sodium bicarbonate, 650 mg, Oral, TID  sodium chloride, 10 mL, Intravenous, Q12H  sodium chloride, 10 mL, Intravenous, Q12H  sodium chloride, 4 mL, Nebulization, BID - RT  terazosin, 1 mg, Oral, Nightly  thiamine, 100 mg, Intravenous, Daily  vancomycin, 1,000 mg, Intravenous, Once  Vancomycin Pharmacy Intermittent/Pulse Dosing, , Does not apply, Daily        heparin, 10.4 Units/kg/hr, Last Rate: 17.4 Units/kg/hr (06/17/24 1406)  Pharmacy to dose vancomycin,       Lines, Drains & Airways       Active LDAs       Name Placement date Placement time Site Days    Peripheral IV 06/09/24 1415 Anterior;Right;Upper Arm 06/09/24  1415  Arm  7    Urethral Catheter Straight-tip 16 Fr. 06/12/24  1433  -- 3    Hemodialysis Cath Double with Pigtail 06/15/24  1530  Internal Jugular  less than 1                  Isolation status: No active isolations    Dietary Orders (From admission, onward)       Start     Ordered    06/15/24 1856  Diet: Regular/House, Diabetic, Cardiac; Healthy Heart (2-3 Na+); Consistent Carbohydrate; Texture: Mechanical Ground (NDD 2); Fluid Consistency: Thin (IDDSI 0)  Diet Effective Now        References:    Diet Order Crosswalk   Question Answer Comment   Diets: Regular/House    Diets: Diabetic    Diets: Cardiac    Cardiac Diet: Healthy Heart (2-3 Na+)    Diabetic Diet: Consistent Carbohydrate    Texture: Mechanical Ground (NDD 2)    Fluid Consistency: Thin (IDDSI 0)        06/15/24 1855    06/11/24 1159  Dietary Nutrition Supplements Boost Glucose Control (Glucerna Shake)  Daily With Breakfast & Dinner      Question:  Select Supplement:   Answer:  Boost Glucose Control (Glucerna Shake)    06/11/24 1159                    [facility] Problems  Cough  Minor hemoptysis  Chronic anticoagulation  Left lower lobe infiltrate  Dysphagia with modified consistency diet recommended  AMENA with CKD 3  Relevant Medical Diagnoses  Ongoing treatment for UTI and left lower extremity cellulitis  CAD with history CABG  Atrial fibrillation  Chronic HFpEF  COCO - non compliant with CPAP  Chronic cervical and lumbar stenosis  Type 2 diabetes mellitus    Plan of Treatment    Cough improved.  Continue bronchodilators.    Left lower lobe infiltrate unchanged on last imagin.  Elevated procalcitonin but in the context of ESRD.  No other infective features and normal white count.    Currently on antibiotics per ID for cellulitis and UTI.    No further hemoptysis , continue with heparin drip.    Remains on modified consistency diet for dysphagia.    Continue O2, remains on home O2 flows.    Not compliant with CPAP. Uses O2 instead at home.      Jose Combs MD  06/17/24  14:49 EDT      Part of this note may be an electronic transcription/translation of spoken language to printed text using the Dragon Dictation System.

## 2024-06-17 NOTE — NURSING NOTE
Dialysis complete, removed no fluid with this treatment and no complications noted.  Pre and post vitals are in epic.

## 2024-06-17 NOTE — THERAPY TREATMENT NOTE
Patient Name: Dilip Verma  : 1950    MRN: [id number]                              Today's Date: 2024       Admit Date: 2024    Visit Dx:     ICD-10-CM ICD-9-CM   1. Acute respiratory failure with hypoxia  J96.01 518.81   2. Sepsis without acute organ dysfunction, due to unspecified organism  A41.9 038.9     995.91   3. Hyperkalemia  E87.5 276.7   4. Metabolic encephalopathy  G93.41 348.31   5. Acute UTI  N39.0 599.0   6. Hyperglycemia due to diabetes mellitus  E11.65 250.02   7. Chronic heart failure with preserved ejection fraction (HFpEF)  I50.32 428.9   8. Acute UTI (urinary tract infection)  N39.0 599.0     Patient Active Problem List   Diagnosis    Anxiety    Colon polyp    Type 2 diabetes mellitus with hyperglycemia, with long-term current use of insulin    Erectile dysfunction    Hyperlipidemia    Type 2 acute myocardial infarction    CAD (coronary artery disease)    Hx of CABG    Chronic diastolic heart failure    Hypersomnia due to medical condition    CSA (central sleep apnea)    Periodic breathing    HTN (hypertension)    History of stroke    CKD (chronic kidney disease) stage 3, GFR 30-59 ml/min    Urinary retention    Acute on chronic renal failure    Acute UTI (urinary tract infection)    Acute on chronic diastolic (congestive) heart failure    Bacteriuria    Rectal pain    Status post total replacement of hip    Vitamin D deficiency    Post-acute COVID-19 syndrome    Insulin dose changed    Arthropathy associated with neurological disorder    Personal history of colonic polyps    Type 2 diabetes mellitus with diabetic neuropathy, with long-term current use of insulin    Cervical spinal stenosis    Abscess of skin    Overweight    Cervical stenosis of spine    Spinal stenosis, lumbar region, without neurogenic claudication    Medically noncompliant    Chronic heart failure with preserved ejection fraction (HFpEF)    Carotid stenosis    Peripheral arterial disease    Sepsis     Hyperkalemia    Metabolic encephalopathy    AMENA (acute kidney injury)    Paroxysmal atrial fibrillation     Past Medical History:   Diagnosis Date    AMENA (acute kidney injury) 12/03/2020    Alcoholism 1989    not since 1998    CORI positive     Anemia     Anxiety     Ataxia     Atypical chest pain 04/19/2022    SEEN AT [facility] ER    Balance disorder     Carotid stenosis 3/28/2024    Cataract     BILATERAL, S/P EXTRACTION    Cervical radiculopathy 11/11/2019    SEEN AT  [facility] ER    Cervical spinal cord compression     Chronic diastolic (congestive) heart failure     Chronic kidney disease     STAGE 3, FOLLOWED BY DR. VIVAR    Chronic pancreatitis     Closed left subtrochanteric femur fracture 12/01/2020    ADMITTED TO [facility]    Closed nondisplaced intertrochanteric fracture of left femur 12/01/2020    ADMITTED TO [facility]    Colon polyps     FOLLOWED BY DR. AVTAR JEONG    Constipation     Contracture, right hand     Coronary artery disease     CABG 7/2019    COVID-19 07/2022    DDD (degenerative disc disease), cervical     Diabetes mellitus, type II     IDDM    Diabetic retinopathy     Difficulty walking     Dysphagia     Elevated brain natriuretic peptide (BNP) level 08/2014    Elevated LFTs 03/2021    Erectile dysfunction     Fissure, anal 2022    Foot drop     Fuchs' corneal dystrophy of right eye     Glaucoma     BILATERAL    History of alcohol abuse     Hyperlipidemia     Hypersomnia     Hypertension     Hypertensive urgency 02/25/2020    ADMITTED TO [facility]    Insomnia     Kidney stones     LV dysfunction 06/2016    Lyme disease     Lymphadenopathy syndrome 05/2021    Macular edema     BILATERAL    Myocardial infarction 11/05/2019    NSTEMI, ADMITTED TO [facility]    Myocardial infarction 07/12/2019    NSTEMI, ADMITTED TO [facility]    Neuropathy in diabetes     Non-celiac gluten sensitivity     Orthostasis     Osteoporosis     Oxygen dependent     PAD (peripheral artery disease)     Paroxysmal atrial fibrillation 6/6/2024    PNA (pneumonia)  "06/2016    LEFT LOBE    Polyneuropathy     PTSD (post-traumatic stress disorder)     Pulmonary hypertension     Pulmonary nodule     Senile ectropion of both lower eyelids 02/2022    Sepsis 12/16/2020    D/T UTI, ADMITTED TO [facility]    Sleep apnea     STATES DOESN'T USE BIPAP OR CPAP    Spinal stenosis in cervical region     SEVERE-LIMITED ROM    Stroke 2012    \"slight stroke\"    Syncope and collapse 07/06/2019    ADMITTED TO [place]    Urinary retention 04/05/2021    SEEN AT [facility] ER    Vision loss     Vitamin D deficiency      Past Surgical History:   Procedure Laterality Date    CARDIAC CATHETERIZATION N/A 07/15/2019    Procedure: Coronary angiography;  Surgeon: Carrie Price MD;  Location:  [facility];  Service: Cardiovascular    CARDIAC CATHETERIZATION N/A 07/15/2019    Procedure: Left Heart Cath;  Surgeon: Carrie Price MD;  Location:  [facility];  Service: Cardiovascular    CARDIAC CATHETERIZATION N/A 07/15/2019    Procedure: Left ventriculography;  Surgeon: Carrie Price MD;  Location:  [facility];  Service: Cardiovascular    CARDIAC CATHETERIZATION  07/15/2019    Procedure: Functional Flow [place];  Surgeon: Carrie Price MD;  Location:  [facility];  Service: Cardiovascular    CARDIAC CATHETERIZATION N/A 11/06/2019    Procedure: Right and Left Heart Cath;  Surgeon: Mya Smith MD;  Location:  [facility];  Service: Cardiovascular    CARDIAC CATHETERIZATION N/A 11/06/2019    Procedure: Coronary angiography;  Surgeon: Mya Smith MD;  Location:  [facility];  Service: Cardiovascular    CARDIAC SURGERY      CATARACT EXTRACTION Left 2014    CATARACT EXTRACTION Right 11/2016    PHACO/IOL, DR. LEN DENTON    COLONOSCOPY N/A 03/16/2023    ENTIRE COLON WNL, RESCOPE IN 5 YRS, DR. LINDA LIZARRAGA AT [facility]    COLONOSCOPY W/ POLYPECTOMY N/A 01/02/2015    A FEW DIVERTICULA IN SIGMOID, 6 MM TUBULOVILLOUS " ADENOMA POLYP IN RECTUM, SMALL HEMORRHOIDS, MELANOSIS COLI, DR. AVTAR JEONG AT [facility]    CORONARY ARTERY BYPASS GRAFT N/A 07/18/2019    Procedure: INTRAOPERATIVE SHOAIB; STERNOTOMY CORONARY ARTERY BYPASS x 3  USING LEFT INTERNAL MAMMARY ARTERY GRAFT UTILIZING ENDOSCOPICALLY HARVESTED RIGHT GREATER SAPHENOUS VEIN AND PRP.;  Surgeon: Bill Devi MD;  Location: [facility] OR;  Service: Cardiothoracic    CYSTOSCOPY BLADDER STONE LITHOTRIPSY N/A     DENTAL PROCEDURE Bilateral     3 surgeries inder implants    FEMUR IM NAILING/RODDING Left 12/03/2020    Procedure: LEFT HIP INTRAMEDULLARY NAIL;  Surgeon: Niraj Ravi MD;  Location: [facility] OR;  Service: Orthopedic Spine;  Laterality: Left;    INCISION AND DRAINAGE PERIRECTAL ABSCESS N/A 10/04/2023    Procedure: Incision and drainage of perianal and buttock abscess;  Surgeon: Herbie Villatoro MD;  Location: [facility] OR;  Service: General;  Laterality: N/A;    INGUINAL HERNIA REPAIR Bilateral     TOENAIL EXCISION  06/2022    TONSILLECTOMY Bilateral     TOTAL HIP ARTHROPLASTY REVISION Left 01/11/2022    Procedure: TOTAL HIP ARTHROPLASTY REVISION- POSTERIOR;  Surgeon: Jurgen Candelaria II, MD;  Location: [facility] OR;  Service: Orthopedics;  Laterality: Left;    VASECTOMY N/A       General Information       Row Name 06/17/24 1617          Physical Therapy Time and Intention    Document Type therapy note (daily note)  -EB     Mode of Treatment physical therapy  -EB       Row Name 06/17/24 1617          General Information    Patient Profile Reviewed yes  -EB     Existing Precautions/Restrictions fall  -EB       Row Name 06/17/24 1617          Cognition    Orientation Status (Cognition) oriented to;person  -EB       Row Name 06/17/24 1617          Safety Issues, Functional Mobility    Safety Issues Affecting Function (Mobility) insight into deficits/self-awareness;awareness of need for assistance;ability to follow  commands;impulsivity;safety precaution awareness;sequencing abilities  -EB     Impairments Affecting Function (Mobility) balance;cognition;endurance/activity tolerance;strength;coordination;motor control  -EB               User Key  (r) = Recorded By, (t) = Taken By, (c) = Cosigned By      Initials Name Provider Type    Gina Nolasco PTA Physical Therapist Assistant                   Mobility       Row Name 06/17/24 1619          Bed Mobility    Supine-Sit [place] (Bed Mobility) moderate assist (50% patient effort);2 person assist;nonverbal cues (demo/gesture);verbal cues  -EB     Sit-Supine [place] (Bed Mobility) moderate assist (50% patient effort);2 person assist;verbal cues;nonverbal cues (demo/gesture)  -EB     Assistive Device (Bed Mobility) bed rails;head of bed elevated  -EB       Row Name 06/17/24 1619          Sit-Stand Transfer    Sit-Stand [place] (Transfers) maximum assist (25% patient effort);2 person assist;nonverbal cues (demo/gesture);verbal cues  -EB     Assistive Device (Sit-Stand Transfers) walker, front-wheeled  -EB     Comment, (Sit-Stand Transfer) 2 stands from EOB. Pt leans to left side.  -EB               User Key  (r) = Recorded By, (t) = Taken By, (c) = Cosigned By      Initials Name Provider Type    Gina Nolasco PTA Physical Therapist Assistant                   Obj/Interventions       Row Name 06/17/24 1628          Balance    Balance Assessment sitting static balance;standing static balance  -EB     Static Sitting Balance standby assist  -EB     Static Standing Balance maximum assist;2-person assist  -EB               User Key  (r) = Recorded By, (t) = Taken By, (c) = Cosigned By      Initials Name Provider Type    Gina Nolasco PTA Physical Therapist Assistant                   Goals/Plan    No documentation.                  Clinical Impression       Row Name 06/17/24 1623          Plan of Care Review    Plan of Care Reviewed With patient  -EB     Progress  no change  -EB     Outcome Evaluation Pt seen for PT tx today. Pt did improve with bed mobility needing ModAX1  and stood twice with Max assist of 2. Pt leaning to left side and cued pt to shift his weight to the right. Pt had difficulty taking any meaningful steps despite cues. Pt was able to scoot to HOB with MinAX2/ModA with max cues needed. Will continue to follow pt and progress as able.  -EB       Row Name 06/17/24 1627          Therapy Assessment/Plan (PT)    Therapy Frequency (PT) 3 times/wk  -EB       Row Name 06/17/24 1627          Positioning and Restraints    Pre-Treatment Position in bed  -EB     Post Treatment Position bed  -EB     In Bed supine;call light within reach;encouraged to call for assist;exit alarm on  -EB               User Key  (r) = Recorded By, (t) = Taken By, (c) = Cosigned By      Initials Name Provider Type    Gina Nolasco PTA Physical Therapist Assistant                   Outcome Measures       Row Name 06/17/24 1627          How much help from another person do you currently need...    Turning from your back to your side while in flat bed without using bedrails? 2  -EB     Moving from lying on back to sitting on the side of a flat bed without bedrails? 2  -EB     Moving to and from a bed to a chair (including a wheelchair)? 1  -EB     Standing up from a chair using your arms (e.g., wheelchair, bedside chair)? 2  -EB     Climbing 3-5 steps with a railing? 1  -EB     To walk in hospital room? 1  -EB     AM-PAC 6 Clicks Score (PT) 9  -EB     Highest Level of Mobility Goal 3 --> Sit at edge of bed  -EB               User Key  (r) = Recorded By, (t) = Taken By, (c) = Cosigned By      Initials Name Provider Type    Gina Nolasco PTA Physical Therapist Assistant                                 Physical Therapy Education       Title: PT OT SLP Therapies (In Progress)       Topic: Physical Therapy (In Progress)       Point: Mobility training (In Progress)       Learning Progress  Summary             Patient Acceptance, E,D, NR,NL by EB at 6/17/2024 1627    Acceptance, E,TB, NR by ST at 6/14/2024 1538    Acceptance, E,D, NL by EB at 6/12/2024 1321    Acceptance, E, NR by EM at 6/7/2024 1511    Acceptance, E,D, DU by PC at 6/5/2024 1518    Acceptance, E,D, DU,NR by PC at 6/4/2024 1557    Acceptance, E,D, DU by PC at 6/2/2024 1427    Acceptance, E,TB,D, VU by CB at 6/1/2024 2218    Acceptance, E,D, DU by PC at 5/31/2024 1612   Family Acceptance, E,TB,D, VU by CB at 6/1/2024 2218   Significant Other Acceptance, E, NR by EM at 6/7/2024 1511                         Point: Home exercise program (In Progress)       Learning Progress Summary             Patient Acceptance, E,D, NR,NL by EB at 6/17/2024 1627    Nonacceptance, E, NL,NR by DJ at 6/10/2024 1541    Acceptance, E,D, DU by PC at 6/5/2024 1518    Acceptance, E,D, DU,NR by PC at 6/4/2024 1557    Acceptance, E,D, DU by PC at 6/2/2024 1427    Acceptance, E,TB,D, VU by CB at 6/1/2024 2218    Acceptance, E,D, DU by PC at 5/31/2024 1612   Family Nonacceptance, E, NL,NR by DJ at 6/10/2024 1541    Acceptance, E,TB,D, VU by CB at 6/1/2024 2218                         Point: Body mechanics (In Progress)       Learning Progress Summary             Patient Acceptance, E,D, NR,NL by EB at 6/17/2024 1627    Acceptance, E,TB, NR by ST at 6/14/2024 1538    Acceptance, E,D, NL by EB at 6/12/2024 1321    Nonacceptance, E, NL,NR by DJ at 6/10/2024 1541    Acceptance, E,D, DU by PC at 6/5/2024 1518    Acceptance, E,D, DU,NR by PC at 6/4/2024 1557    Acceptance, E,D, DU by PC at 6/2/2024 1427    Acceptance, E,TB,D, VU by CB at 6/1/2024 2218    Acceptance, E,D, DU by PC at 5/31/2024 1612   Family Nonacceptance, E, NL,NR by DJ at 6/10/2024 1541    Acceptance, E,TB,D, VU by CB at 6/1/2024 2218                         Point: Precautions (In Progress)       Learning Progress Summary             Patient Nonacceptance, E, NL,NR by DJ at 6/10/2024 1541    Acceptance,  E,D, DU by PC at 6/5/2024 1518    Acceptance, E,D, DU,NR by PC at 6/4/2024 1557    Acceptance, E,D, DU by PC at 6/2/2024 1427    Acceptance, E,TB,D, VU by CB at 6/1/2024 2218    Acceptance, E,D, DU by PC at 5/31/2024 1612   Family Nonacceptance, E, NL,NR by DJ at 6/10/2024 1541    Acceptance, E,TB,D, VU by CB at 6/1/2024 2218                                         User Key       Initials Effective Dates Name Provider Type Discipline    PC 06/16/21 -  Gemma Hamilton, PT Physical Therapist PT    EM 06/16/21 -  Nila Toro, PT Physical Therapist PT    EB 02/14/23 -  Gina Fortune PTA Physical Therapist Assistant PT    DJ 10/25/19 -  Jacqueline Lizarraga, PT Physical Therapist PT    CB 02/21/24 -  Murphy Herndon, RN Registered Nurse Nurse     09/22/22 -  Sahara Harrison, PT Physical Therapist PT                  PT Recommendation and Plan     Plan of Care Reviewed With: patient  Progress: no change  Outcome Evaluation: Pt seen for PT tx today. Pt did improve with bed mobility needing ModAX1  and stood twice with Max assist of 2. Pt leaning to left side and cued pt to shift his weight to the right. Pt had difficulty taking any meaningful steps despite cues. Pt was able to scoot to HOB with MinAX2/ModA with max cues needed. Will continue to follow pt and progress as able.     Time Calculation:         PT Charges       Row Name 06/17/24 1614             Time Calculation    Start Time 1342  -EB      Stop Time 1405  -EB      Time Calculation (min) 23 min  -EB      PT Received On 06/17/24  -EB      PT - Next Appointment 06/18/24  -EB         Time Calculation- PT    Total Timed Code Minutes- PT 23 minute(s)  -EB                User Key  (r) = Recorded By, (t) = Taken By, (c) = Cosigned By      Initials Name Provider Type    EB Gina Fortune PTA Physical Therapist Assistant                  Therapy Charges for Today       Code Description Service Date Service Provider Modifiers Qty    33244544914 HC PT THER PROC EA 15  MIN 6/17/2024 Gina Fortune, PTA GP 1    44818143464  PT THERAPEUTIC ACT EA 15 MIN 6/17/2024 Gina Fortune, PTA GP 1    96085306542 HC PT THER SUPP EA 15 MIN 6/17/2024 Gina Fortune, PTA GP 2            PT G-Codes  Outcome Measure Options: AM-PAC 6 Clicks Daily Activity (OT)  AM-PAC 6 Clicks Score (PT): 9  AM-PAC 6 Clicks Score (OT): 6  Modified Jeremias Scale: 4 - Moderately severe disability.  Unable to walk without assistance, and unable to attend to own bodily needs without assistance.       Gina Fortune PTA  6/17/2024

## 2024-06-18 LAB
ANION GAP SERPL CALCULATED.3IONS-SCNC: 12.4 MMOL/L (ref 5–15)
APTT PPP: 57.5 SECONDS (ref 22.7–35.4)
APTT PPP: 69.7 SECONDS (ref 22.7–35.4)
APTT PPP: 86.2 SECONDS (ref 22.7–35.4)
BASOPHILS # BLD AUTO: 0.03 10*3/MM3 (ref 0–0.2)
BASOPHILS NFR BLD AUTO: 0.4 % (ref 0–1.5)
BUN SERPL-MCNC: 27 MG/DL (ref 8–23)
BUN/CREAT SERPL: 7.8 (ref 7–25)
CALCIUM SPEC-SCNC: 8.2 MG/DL (ref 8.6–10.5)
CHLORIDE SERPL-SCNC: 105 MMOL/L (ref 98–107)
CO2 SERPL-SCNC: 20.6 MMOL/L (ref 22–29)
CREAT SERPL-MCNC: 3.46 MG/DL (ref 0.76–1.27)
DEPRECATED RDW RBC AUTO: 44.5 FL (ref 37–54)
EGFRCR SERPLBLD CKD-EPI 2021: 17.7 ML/MIN/1.73
EOSINOPHIL # BLD AUTO: 0.23 10*3/MM3 (ref 0–0.4)
EOSINOPHIL NFR BLD AUTO: 3.4 % (ref 0.3–6.2)
ERYTHROCYTE [DISTWIDTH] IN BLOOD BY AUTOMATED COUNT: 12.9 % (ref 12.3–15.4)
GLUCOSE BLDC GLUCOMTR-MCNC: 109 MG/DL (ref 70–130)
GLUCOSE BLDC GLUCOMTR-MCNC: 147 MG/DL (ref 70–130)
GLUCOSE BLDC GLUCOMTR-MCNC: 163 MG/DL (ref 70–130)
GLUCOSE BLDC GLUCOMTR-MCNC: 169 MG/DL (ref 70–130)
GLUCOSE SERPL-MCNC: 111 MG/DL (ref 65–99)
HCT VFR BLD AUTO: 31.9 % (ref 37.5–51)
HGB BLD-MCNC: 10.5 G/DL (ref 13–17.7)
IMM GRANULOCYTES # BLD AUTO: 0.05 10*3/MM3 (ref 0–0.05)
IMM GRANULOCYTES NFR BLD AUTO: 0.7 % (ref 0–0.5)
LYMPHOCYTES # BLD AUTO: 1.12 10*3/MM3 (ref 0.7–3.1)
LYMPHOCYTES NFR BLD AUTO: 16.5 % (ref 19.6–45.3)
MCH RBC QN AUTO: 31.5 PG (ref 26.6–33)
MCHC RBC AUTO-ENTMCNC: 32.9 G/DL (ref 31.5–35.7)
MCV RBC AUTO: 95.8 FL (ref 79–97)
MONOCYTES # BLD AUTO: 0.66 10*3/MM3 (ref 0.1–0.9)
MONOCYTES NFR BLD AUTO: 9.7 % (ref 5–12)
NEUTROPHILS NFR BLD AUTO: 4.7 10*3/MM3 (ref 1.7–7)
NEUTROPHILS NFR BLD AUTO: 69.3 % (ref 42.7–76)
NRBC BLD AUTO-RTO: 0 /100 WBC (ref 0–0.2)
PLATELET # BLD AUTO: 256 10*3/MM3 (ref 140–450)
PMV BLD AUTO: 10.1 FL (ref 6–12)
POTASSIUM SERPL-SCNC: 4.4 MMOL/L (ref 3.5–5.2)
RBC # BLD AUTO: 3.33 10*6/MM3 (ref 4.14–5.8)
SODIUM SERPL-SCNC: 138 MMOL/L (ref 136–145)
VANCOMYCIN SERPL-MCNC: 20.6 MCG/ML (ref 5–40)
WBC NRBC COR # BLD AUTO: 6.79 10*3/MM3 (ref 3.4–10.8)

## 2024-06-18 PROCEDURE — 85730 THROMBOPLASTIN TIME PARTIAL: CPT | Performed by: HOSPITALIST

## 2024-06-18 PROCEDURE — 94799 UNLISTED PULMONARY SVC/PX: CPT

## 2024-06-18 PROCEDURE — 85025 COMPLETE CBC W/AUTO DIFF WBC: CPT | Performed by: INTERNAL MEDICINE

## 2024-06-18 PROCEDURE — 63710000001 INSULIN LISPRO (HUMAN) PER 5 UNITS: Performed by: INTERNAL MEDICINE

## 2024-06-18 PROCEDURE — 94664 DEMO&/EVAL PT USE INHALER: CPT

## 2024-06-18 PROCEDURE — 25010000002 THIAMINE HCL 200 MG/2ML SOLUTION: Performed by: INTERNAL MEDICINE

## 2024-06-18 PROCEDURE — 80202 ASSAY OF VANCOMYCIN: CPT | Performed by: INTERNAL MEDICINE

## 2024-06-18 PROCEDURE — 82948 REAGENT STRIP/BLOOD GLUCOSE: CPT

## 2024-06-18 PROCEDURE — 80048 BASIC METABOLIC PNL TOTAL CA: CPT

## 2024-06-18 PROCEDURE — 85730 THROMBOPLASTIN TIME PARTIAL: CPT | Performed by: INTERNAL MEDICINE

## 2024-06-18 PROCEDURE — 94761 N-INVAS EAR/PLS OXIMETRY MLT: CPT

## 2024-06-18 PROCEDURE — 25010000002 HEPARIN (PORCINE) PER 1000 UNITS: Performed by: INTERNAL MEDICINE

## 2024-06-18 PROCEDURE — 25010000002 HEPARIN (PORCINE) 25000-0.45 UT/250ML-% SOLUTION: Performed by: INTERNAL MEDICINE

## 2024-06-18 PROCEDURE — 94760 N-INVAS EAR/PLS OXIMETRY 1: CPT

## 2024-06-18 PROCEDURE — 97530 THERAPEUTIC ACTIVITIES: CPT

## 2024-06-18 RX ORDER — TRAMADOL HYDROCHLORIDE 50 MG/1
50 TABLET ORAL NIGHTLY PRN
Status: DISPENSED | OUTPATIENT
Start: 2024-06-18 | End: 2024-06-23

## 2024-06-18 RX ADMIN — HEPARIN SODIUM 2900 UNITS: 5000 INJECTION INTRAVENOUS; SUBCUTANEOUS at 16:02

## 2024-06-18 RX ADMIN — MIDODRINE HYDROCHLORIDE 10 MG: 5 TABLET ORAL at 17:30

## 2024-06-18 RX ADMIN — METOPROLOL TARTRATE 12.5 MG: 25 TABLET, FILM COATED ORAL at 09:23

## 2024-06-18 RX ADMIN — THIAMINE HYDROCHLORIDE 100 MG: 100 INJECTION, SOLUTION INTRAMUSCULAR; INTRAVENOUS at 09:23

## 2024-06-18 RX ADMIN — SODIUM BICARBONATE 650 MG: 650 TABLET, ORALLY DISINTEGRATING ORAL at 09:23

## 2024-06-18 RX ADMIN — Medication 10 ML: at 21:17

## 2024-06-18 RX ADMIN — IPRATROPIUM BROMIDE AND ALBUTEROL SULFATE 3 ML: .5; 3 SOLUTION RESPIRATORY (INHALATION) at 06:54

## 2024-06-18 RX ADMIN — Medication 1 APPLICATION: at 21:17

## 2024-06-18 RX ADMIN — ASPIRIN 81 MG: 81 TABLET, CHEWABLE ORAL at 09:23

## 2024-06-18 RX ADMIN — LATANOPROST 1 DROP: 50 SOLUTION OPHTHALMIC at 21:13

## 2024-06-18 RX ADMIN — Medication 4 ML: at 20:24

## 2024-06-18 RX ADMIN — TERAZOSIN HYDROCHLORIDE 1 MG: 1 CAPSULE ORAL at 21:11

## 2024-06-18 RX ADMIN — HEPARIN SODIUM 16.4 UNITS/KG/HR: 10000 INJECTION, SOLUTION INTRAVENOUS at 17:02

## 2024-06-18 RX ADMIN — Medication 10 ML: at 09:25

## 2024-06-18 RX ADMIN — TRAMADOL HYDROCHLORIDE 50 MG: 50 TABLET ORAL at 23:45

## 2024-06-18 RX ADMIN — MIDODRINE HYDROCHLORIDE 10 MG: 5 TABLET ORAL at 12:50

## 2024-06-18 RX ADMIN — BRIMONIDINE TARTRATE 1 DROP: 2 SOLUTION OPHTHALMIC at 21:13

## 2024-06-18 RX ADMIN — HEPARIN SODIUM 15.4 UNITS/KG/HR: 10000 INJECTION, SOLUTION INTRAVENOUS at 07:53

## 2024-06-18 RX ADMIN — SODIUM BICARBONATE 650 MG: 650 TABLET, ORALLY DISINTEGRATING ORAL at 21:12

## 2024-06-18 RX ADMIN — GUAIFENESIN 600 MG: 600 TABLET, EXTENDED RELEASE ORAL at 09:24

## 2024-06-18 RX ADMIN — IPRATROPIUM BROMIDE AND ALBUTEROL SULFATE 3 ML: .5; 3 SOLUTION RESPIRATORY (INHALATION) at 10:58

## 2024-06-18 RX ADMIN — IPRATROPIUM BROMIDE AND ALBUTEROL SULFATE 3 ML: .5; 3 SOLUTION RESPIRATORY (INHALATION) at 20:22

## 2024-06-18 RX ADMIN — GUAIFENESIN 600 MG: 600 TABLET, EXTENDED RELEASE ORAL at 21:12

## 2024-06-18 RX ADMIN — IPRATROPIUM BROMIDE AND ALBUTEROL SULFATE 3 ML: .5; 3 SOLUTION RESPIRATORY (INHALATION) at 15:28

## 2024-06-18 RX ADMIN — Medication 4 ML: at 06:54

## 2024-06-18 RX ADMIN — SENNOSIDES AND DOCUSATE SODIUM 2 TABLET: 50; 8.6 TABLET ORAL at 21:12

## 2024-06-18 RX ADMIN — AMIODARONE HYDROCHLORIDE 200 MG: 200 TABLET ORAL at 09:23

## 2024-06-18 RX ADMIN — Medication 1 APPLICATION: at 09:25

## 2024-06-18 RX ADMIN — MIDODRINE HYDROCHLORIDE 10 MG: 5 TABLET ORAL at 09:23

## 2024-06-18 RX ADMIN — OLANZAPINE 5 MG: 5 TABLET, FILM COATED ORAL at 21:12

## 2024-06-18 RX ADMIN — INSULIN LISPRO 2 UNITS: 100 INJECTION, SOLUTION INTRAVENOUS; SUBCUTANEOUS at 12:50

## 2024-06-18 RX ADMIN — METOPROLOL TARTRATE 12.5 MG: 25 TABLET, FILM COATED ORAL at 21:12

## 2024-06-18 RX ADMIN — DULOXETINE HYDROCHLORIDE 30 MG: 30 CAPSULE, DELAYED RELEASE ORAL at 21:12

## 2024-06-18 RX ADMIN — BRIMONIDINE TARTRATE 1 DROP: 2 SOLUTION OPHTHALMIC at 09:24

## 2024-06-18 RX ADMIN — SODIUM BICARBONATE 650 MG: 650 TABLET, ORALLY DISINTEGRATING ORAL at 17:30

## 2024-06-18 NOTE — PLAN OF CARE
Goal Outcome Evaluation:  Plan of Care Reviewed With: (P) patient, spouse        Progress: (P) improving  Outcome Evaluation: (P) Pt seen for OT treatment this date. Pt in bed upon arrival and much more alert. Pt able to answer orientation questions to person and place and displayed increased participation in conversation. Pt demo'd mod A x1 bed mobility, mod A x2 STS transfer, and CGA-min A x2 functional mobility with rwx. Pt showed increased ability to follow commands and sequencing skills during task. Pt took a few side steps at EOB and household distance forward steps. Pt unsteady and mild LOB with turns but overall functional endurance and activity tolerance is improving. Pt still requiring high assist with most ADLs including total assist for LBD skills. OT services would benefit pt to address (I) and safety with ADLs and functional mobility. Rec d/c SNF      Anticipated Discharge Disposition (OT): (P) skilled nursing facility

## 2024-06-18 NOTE — PROGRESS NOTES
[facility]  [phone]  Dr. Jose Combs    Subjective:  LOS: 19    Chief Complaint: Cough    Awake and alert.  No cough or phlegm.  No hemoptysis per his spouse.    Objective   Vital Signs past 24hrs  Temp range: Temp (24hrs), Av.1 °F (36.7 °C), Min:97.7 °F (36.5 °C), Max:98.6 °F (37 °C)    BP range: BP: (114-174)/(53-77) 174/77  Pulse range: Heart Rate:  [59-75] 69  Resp rate range: Resp:  [18-20] 18  Device (Oxygen Therapy): nasal cannulaFlow (L/min):  [2-3] 2  Oxygen range:SpO2:  [91 %-100 %] 93 %   Mechanical Ventilator:     Physical Exam  Constitutional:       Appearance: He is obese.   Eyes:      Pupils: Pupils are equal, round, and reactive to light.   Cardiovascular:      Rate and Rhythm: Normal rate and regular rhythm.      Heart sounds: No murmur heard.  Pulmonary:      Effort: Pulmonary effort is normal.      Breath sounds: Normal breath sounds.   Abdominal:      General: Bowel sounds are normal.      Palpations: Abdomen is soft. There is no mass.      Tenderness: There is no abdominal tenderness.   Musculoskeletal:         General: No swelling.   Neurological:      Mental Status: He is alert.       Results Review:    I have reviewed the laboratory and imaging data since the last note by [facility] physician.  My annotations are noted in assessment and plan.      Result Review:  I have personally reviewed the results from last note by [facility] physician to 2024 15:04 EDT and agree with these findings:  [x]  Laboratory list / accordion  [x]  Microbiology  [x]  Radiology  []  EKG/Telemetry   []  Cardiology/Vascular   []  Pathology  []  Old records  []  Other:    Medication Review:  I have reviewed the current MAR.  My annotations are noted in assessment and plan.    amiodarone, 200 mg, Oral, Q24H  [Held by provider] apixaban, 5 mg, Oral, Q12H  aspirin, 81 mg, Oral, Daily  brimonidine, 1 drop, Both Eyes, BID  DULoxetine, 30 mg, Oral, Daily  guaiFENesin, 600 mg, Oral, Q12H  insulin lispro,  2-9 Units, Subcutaneous, 4x Daily AC & at Bedtime  ipratropium-albuterol, 3 mL, Nebulization, 4x Daily - RT  latanoprost, 1 drop, Both Eyes, Nightly  Menthol-Zinc Oxide, 1 Application, Topical, Q12H  metoprolol tartrate, 12.5 mg, Oral, Q12H  midodrine, 10 mg, Oral, TID AC  OLANZapine, 5 mg, Oral, Nightly  senna-docusate sodium, 2 tablet, Oral, BID  sodium bicarbonate, 650 mg, Oral, TID  sodium chloride, 10 mL, Intravenous, Q12H  sodium chloride, 10 mL, Intravenous, Q12H  sodium chloride, 4 mL, Nebulization, BID - RT  terazosin, 1 mg, Oral, Nightly  thiamine, 100 mg, Intravenous, Daily  Vancomycin Pharmacy Intermittent/Pulse Dosing, , Does not apply, Daily        heparin, 10.4 Units/kg/hr, Last Rate: 15.4 Units/kg/hr (06/18/24 0753)  Pharmacy to dose vancomycin,       Lines, Drains & Airways       Active LDAs       Name Placement date Placement time Site Days    Peripheral IV 06/09/24 1415 Anterior;Right;Upper Arm 06/09/24  1415  Arm  7    Urethral Catheter Straight-tip 16 Fr. 06/12/24  1433  -- 3    Hemodialysis Cath Double with Pigtail 06/15/24  1530  Internal Jugular  less than 1                  Isolation status: No active isolations    Dietary Orders (From admission, onward)       Start     Ordered    06/15/24 1856  Diet: Regular/House, Diabetic, Cardiac; Healthy Heart (2-3 Na+); Consistent Carbohydrate; Texture: Mechanical Ground (NDD 2); Fluid Consistency: Thin (IDDSI 0)  Diet Effective Now        References:    Diet Order [place]   Question Answer Comment   Diets: Regular/House    Diets: Diabetic    Diets: Cardiac    Cardiac Diet: Healthy Heart (2-3 Na+)    Diabetic Diet: Consistent Carbohydrate    Texture: Mechanical Ground (NDD 2)    Fluid Consistency: Thin (IDDSI 0)        06/15/24 1855    06/11/24 1159  Dietary Nutrition Supplements Boost Glucose Control (Glucerna Shake)  Daily With Breakfast & Dinner      Question:  Select Supplement:  Answer:  Boost Glucose Control (Glucerna Shake)    06/11/24 6830                     PCCM Problems  Cough  Minor hemoptysis  Chronic anticoagulation  Left lower lobe infiltrate  Dysphagia with modified consistency diet recommended  AMENA with CKD 3  Relevant Medical Diagnoses  Ongoing treatment for UTI and left lower extremity cellulitis  CAD with history CABG  Atrial fibrillation  Chronic HFpEF  COCO - non compliant with CPAP  Chronic cervical and lumbar stenosis  Type 2 diabetes mellitus    Plan of Treatment    Cough improved.  Continue bronchodilators.  No further hemoptysis.    Left lower lobe infiltrate unchanged on last imagin.  Elevated procalcitonin but in the context of ESRD.  No other infective features and normal white count.    Currently on antibiotics per ID for cellulitis and UTI.    Recommend follow-up imaging with CT chest in 6 to 8 weeks to confirm that left lower lobe infiltrate has cleared.    No further hemoptysis , continue with heparin drip.    Remains on modified consistency diet for dysphagia.    Continue O2, remains on home O2 flows.    Not compliant with CPAP. Uses O2 instead at home.    Pulmonary will sign off.  Please call back as needed.      Jose Combs MD  06/18/24  15:04 EDT      Part of this note may be an electronic transcription/translation of spoken language to printed text using the Dragon Dictation System.

## 2024-06-18 NOTE — PROGRESS NOTES
PROGRESS NOTE      Patient Name: Dilip Verma  : 1950  MRN: [id number]  Primary Care Physician: Fernando Camargo DO  Date of admission: 2024    Patient Care Team:  Fernando Camargo DO as PCP - General (Family Medicine)  Morris Cai MD as Consulting Physician (Sleep Medicine)  Michaela Hylton MD as Consulting Physician (Cardiology)  Hardy Banerjee MD as Consulting Physician (Ophthalmology)  Chula Christianson MD as Consulting Physician (Ophthalmology)  Jason Sherman MD (Endocrinology)  Perla Mayen MD as Consulting Physician (Nephrology)  Federico Hebert MD as Consulting Physician (Pulmonary Disease)  Niraj Ravi MD as Consulting Physician (Orthopedic Surgery)  Papa Velasco MD as Consulting Physician (Urology)  Terese Yost MD as Consulting Physician (Gastroenterology)  Aracelis Ball MD as Consulting Physician (Colon and Rectal Surgery)  [facility] as Primary Care Provider        Reason for Follow up:     AMENA, CKD III      Subjective:     Patient seen and examined, more awake, still  confused  S/p dialysis yesterday    Review of Systems:         Constitutional: weakness.  HEENT:  No headache, otalgia, itchy eyes, nasal discharge or sore throat.  Cardiac:  No chest pain, dyspnea, orthopnea or PND.  Chest:  No cough, phlegm or wheezing.  Abdomen:  No abdominal pain, nausea or vomiting.  Neuro:  No focal weakness, abnormal movements or seizure-like activity.  :   No hematuria, no pyuria, no dysuria, no flank pain.  Extremities:  No  joint pains.  ROS was otherwise negative except as mentioned in the [place].    Social History:  reports that he quit smoking about 24 years ago. His smoking use included cigarettes. He started smoking about 40 years ago. He has a 12 pack-year smoking history. He has been exposed to tobacco smoke. He has never used smokeless tobacco. He reports that he does not currently use alcohol. He reports that he does not  use drugs.    Medications:  Prior to Admission medications    Medication Sig Start Date End Date Taking? Authorizing Provider   aspirin 81 MG EC tablet Take 1 tablet by mouth Every Night.   Yes Heri Rinaldi MD   brimonidine (ALPHAGAN) 0.2 % ophthalmic solution Administer 1 drop to both eyes 2 (Two) Times a Day.   Yes Heri Rinaldi MD   bumetanide (BUMEX) 1 MG tablet Take 1 tablet by mouth Daily.   Yes Heri Rinaldi MD   Cholecalciferol (Vitamin D-3) 125 MCG (5000 UT) tablet Take 1 tablet by mouth Daily.   Yes Heri Rinaldi MD   DULoxetine (CYMBALTA) 30 MG capsule Take 1 capsule by mouth Every Night. 1/16/24  Yes Heri Rinaldi MD   Insulin Glargine, 2 Unit Dial, (TOUJEO) 300 UNIT/ML solution pen-injector injection Inject 24 Units under the skin into the appropriate area as directed Daily.   Yes Heri Rinaldi MD   insulin lispro (humaLOG) 100 UNIT/ML injection Inject 0-14 Units under the skin into the appropriate area as directed 3 (Three) Times a Day Before Meals.  Patient taking differently: Inject 0-14 Units under the skin into the appropriate area as directed 3 (Three) Times a Day Before Meals. SLIDING SCALE  100-150 - 4 units  151-200 - 5 units  201-250 - 6 units  251-300 - 7 units  301-350 - 8 units  351-400 - 9 units  401+ - 10 units 12/7/20  Yes Amador Valverde MD   latanoprost (XALATAN) 0.005 % ophthalmic solution Administer 1 drop to both eyes every night at bedtime. 1/16/23  Yes Heri Rinaldi MD   multivitamin with minerals (MULTIVITAMIN ADULTS PO) Take 1 tablet by mouth Daily.   Yes Heri Rinaldi MD   testosterone (ANDROGEL) 25 MG/2.5GM (1%) gel gel Place 25 mg on the skin as directed by provider 2 (Two) Times a Week.   Yes Heri Rinaldi MD   traMADol (ULTRAM) 50 MG tablet Take 1 tablet by mouth At Night As Needed. 10/16/23  Yes Heri Rinaldi MD   Magnesium 400 MG capsule Take 400 mg by mouth As Needed.    [place]  MD Heri   sildenafil (REVATIO) 20 MG tablet Take 1 tablet by mouth As Needed.    Provider, MD Heri     Scheduled Meds:amiodarone, 200 mg, Oral, Q24H  [Held by provider] apixaban, 5 mg, Oral, Q12H  aspirin, 81 mg, Oral, Daily  brimonidine, 1 drop, Both Eyes, BID  DULoxetine, 30 mg, Oral, Daily  guaiFENesin, 600 mg, Oral, Q12H  insulin lispro, 2-9 Units, Subcutaneous, 4x Daily AC & at Bedtime  ipratropium-albuterol, 3 mL, Nebulization, 4x Daily - RT  latanoprost, 1 drop, Both Eyes, Nightly  Menthol-Zinc Oxide, 1 Application, Topical, Q12H  metoprolol tartrate, 12.5 mg, Oral, Q12H  midodrine, 10 mg, Oral, TID AC  OLANZapine, 5 mg, Oral, Nightly  senna-docusate sodium, 2 tablet, Oral, BID  sodium bicarbonate, 650 mg, Oral, TID  sodium chloride, 10 mL, Intravenous, Q12H  sodium chloride, 10 mL, Intravenous, Q12H  sodium chloride, 4 mL, Nebulization, BID - RT  terazosin, 1 mg, Oral, Nightly  thiamine, 100 mg, Intravenous, Daily  Vancomycin Pharmacy Intermittent/Pulse Dosing, , Does not apply, Daily      Continuous Infusions:heparin, 10.4 Units/kg/hr, Last Rate: 16.4 Units/kg/hr (06/18/24 1702)  Pharmacy to dose vancomycin,           PRN Meds:  acetaminophen **OR** acetaminophen **OR** acetaminophen    senna-docusate sodium **AND** polyethylene glycol **AND** bisacodyl **AND** bisacodyl    dextrose    dextrose    glucagon (human recombinant)    heparin (porcine)    nitroglycerin    OLANZapine    ondansetron    Pharmacy to dose vancomycin    sodium chloride    sodium chloride    sodium chloride    sodium chloride  Allergies:    Allergies   Allergen Reactions    Ace Inhibitors Angioedema    Angiotensin Receptor Blockers Angioedema and Unknown (See Comments)     Angioneurotic edema    Dapagliflozin Other (See Comments)     UTI,  rectal abcess    Losartan Angioedema     Angioneurotic edema    Seroquel [Quetiapine] Hallucinations    Xanax [Alprazolam] Unknown - High Severity    Amlodipine Swelling    Ativan  "[Lorazepam] Hallucinations    Baclofen Hallucinations    Misc. Sulfonamide Containing Compounds Unknown (See Comments)    Minoxidil Confusion    Tetracycline Rash     bliisters in mouth         Objective   Exam:     Vital Signs  Temp:  [97.7 °F (36.5 °C)-98.6 °F (37 °C)] 97.9 °F (36.6 °C)  Heart Rate:  [59-75] 63  Resp:  [18] 18  BP: (114-174)/(53-77) 156/66  SpO2:  [91 %-100 %] 100 %  on  Flow (L/min):  [2-3] 2;   Device (Oxygen Therapy): nasal cannula  Body mass index is 32.15 kg/m².       Exam:     /66 (BP Location: Right arm, Patient Position: Lying)   Pulse 63   Temp 97.9 °F (36.6 °C) (Oral)   Resp 18   Ht 172.7 cm (68\")   Wt 95.9 kg (211 lb 6.7 oz)   SpO2 100%   BMI 32.15 kg/m²     General Appearance:    Appears chronically ill, no distress   Head:    Normocephalic, atraumatic   Eyes:     EOM's intact, sclerae anicteric        Ears:    TMs not observed   Nose:   Patent without discharge   Neck:   Supple, JVD   Lungs:     Clear to auscultation bilaterally, respiratory effort is normal   Chest wall:    No tenderness   Heart:    Regular rate and rhythm, S1 and S2 normal, no   rub    or gallop   Abdomen:     Soft, nontender, nondistended,  no masses, no organomegaly   Extremities:   No edema   Neurologic:  No focal deficits.  Speech is fluent.  Conversation is coherent.      Results Review:  I have personally reviewed most recent Data :  [facility]  CBC    Results from last 7 days   Lab Units 06/18/24  0639 06/17/24  0600 06/16/24  0546 06/15/24  0703 06/14/24  0655 06/13/24  0532 06/12/24  0639   WBC 10*3/mm3 6.79 7.06 7.97 7.52 8.80 9.76 13.69*   HEMOGLOBIN g/dL 10.5* 10.1* 10.4* 10.7* 10.9* 11.1* 12.1*   PLATELETS 10*3/mm3 256 250 263 268 243 209 229     CMP   Results from last 7 days   Lab Units 06/18/24  0639 06/17/24  0559 06/16/24  0546 06/15/24  0703 06/14/24  0655 06/13/24  0532 06/12/24  1859 06/12/24  1739 06/12/24  0639   SODIUM mmol/L 138 137 136 136 132* 131*  --   --  " 132*   POTASSIUM mmol/L 4.4 4.5 4.4 4.1 4.2 3.9  --   --  4.0   CHLORIDE mmol/L 105 105 101 100 98 97*  --   --  97*   CO2 mmol/L 20.6* 22.0 20.5* 20.9* 20.2* 21.5*  --   --  22.9   BUN mg/dL 27* 53* 44* 66* 54* 49*  --   --  38*   CREATININE mg/dL 3.46* 5.23* 4.81* 6.93* 5.83* 5.26*  --   --  4.55*   GLUCOSE mg/dL 111* 141* 159* 143* 168* 149*  --   --  168*   ALBUMIN g/dL  --   --   --   --   --   --   --  2.7*  --    AMMONIA umol/L  --   --   --   --   --   --  31  --   --      ABG    Results from last 7 days   Lab Units 06/15/24  1613   PH, ARTERIAL pH units 7.296*   PCO2, ARTERIAL mm Hg 48.6*   PO2 ART mm Hg 62.1*   O2 SATURATION ART % 88.5*   BASE EXCESS ART mmol/L -3.0*       XR Chest Post CVA Port    Result Date: 6/15/2024  1. Since yesterday's portable chest x-ray on 6/14/2024 at 2:15 p.m., there is been interval placement of a right internal jugular central line-Shiley catheter and its distal tip projects over the superior vena cava in good position and no pneumothorax is seen. Otherwise, there has been no change when compared to yesterday's chest x-ray 6/14/2024 at 2:15 p.m.  2. The patient's had previous median sternotomy there is prominent blunting of the left lateral costophrenic angle and some hazy density over the inferior left hemithorax likely secondary to at least a small to moderate size up to moderate size left pleural effusion there is some dense airspace consolidation in the left lower lung zone left lung base likely compressive atelectasis associated with the left pleural effusion although I cannot exclude superimposed pneumonia at the left lower lobe or lung base and correlate clinically. No additional active disease is seen in the chest.  This report was finalized on 6/15/2024 5:23 PM by Dr. Jaquan Mendieta M.D on Workstation: [id number]      XR Chest 1 View    Result Date: 6/14/2024  As described.    This report was finalized on 6/14/2024 2:42 PM by Dr. Gabe Zimmer M.D on  Workstation: [id number]      XR Chest 1 View    Result Date: 6/11/2024  As described.    This report was finalized on 6/11/2024 2:46 PM by Dr. Gabe Zimmer M.D on Workstation: [id number]       Results for orders placed during the hospital encounter of 05/30/24    Adult Transthoracic Echo Complete W/ Cont if Necessary Per Protocol    Interpretation Summary    Left ventricular systolic function is hyperdynamic (EF > 70%). Calculated left ventricular EF = 75% Global longitudinal LV strain (GLS) = -17.8%. Left ventricle strain data was reviewed by the physician and found to be accurate. Global longitudinal LV strain is normal however there is regional abnormality with apical sparing suggestive of amyloid heart disease. However there is also basal ptal hypertrophy suggestive of hypertrophic cardiomyopathy. Wall motion abnormality is also noted in the basal septum and cannot rule out ischemic component.Consider additional imaging such as cardiac MRI and further evaluation also for amyloid heart disease as clinically indicated. The left ventricular cavity is small in size. Left ventricular wall thickness is consistent with moderate to severe septal asymmetric hypertrophy. There is hypokinesis of the left ventricular basal septum. Left ventricular diastolic function is consistent with (grade II w/high LAP) pseudonormalization.    The left atrial cavity is mildly dilated.    No aortic valve regurgitation or stenosis is present. The aortic valve is abnormal in structure. There is mild thickening of the aortic valve.    There is mild, bileaflet mitral valve thickening present. Trace mitral valve regurgitation is present. No significant mitral valve stenosis is present.    Moderate tricuspid valve regurgitation is present. Estimated right ventricular systolic pressure from tricuspid regurgitation is markedly elevated (>55 mmHg). Calculated right ventricular systolic pressure from tricuspid regurgitation is 74  mmHg.        Assessment & Plan   Assessment and Plan:         Acute UTI (urinary tract infection)    Type 2 diabetes mellitus with hyperglycemia, with long-term current use of insulin    CAD (coronary artery disease)    Hx of CABG    Chronic diastolic heart failure    CSA (central sleep apnea)    HTN (hypertension)    History of stroke    CKD (chronic kidney disease) stage 3, GFR 30-59 ml/min    Peripheral arterial disease    Sepsis    Hyperkalemia    Metabolic encephalopathy    AMENA (acute kidney injury)    Paroxysmal atrial fibrillation    ASSESSMENT:  AMENA likely prerenal ATN 2/2 urosepsis with hemodynamic changes; on CKD III etiology likely diabetic and hypertensive nephropathy with baseline sCr ~0.9-1.2 mg/dL  Hyperkalemia, now resolved  UTI, urine cultures with gram neg bacilli  Leukocytosis  Metabolic encephalopathy  PAD  Hx stroke  HTN  CHF  CAD  DM2  Chronic splenic vein thrombosis  Possible hepatocellular disease/cirrhosis  Likely benign subpleural nodule with bilateral lymphadenopathy  Hiatal hernia    Last TTE 10/5/22 with EF 66%, grade II DD    PLAN :     AMENA likely prerenal ATN 2/2 urosepsis with hemodynamic changes; on CKD III etiology likely diabetic and hypertensive nephropathy with baseline sCr ~0.9-1.2 mg/dL  Initiated on dialysis yesterday 6/15 due to worsening renal function. Tolerated well, ran even  Clinically patient is doing slightly better than yesterday with urine output of greater than 650 cc in last 8 hours  Urine output better and delta creatinine is not that high then we will hold dialysis for tomorrow hemodynamics have improved  Hemodialysis will be tomorrow only if needed  Mental status improving  If urine output keep getting better no need for tunneled dialysis catheter for now  Add PO sodium bicarbonate   Off of Bumex 0.5 mg daily, will continue to hold  CXR 6/11 with pulmonary vasculature unremarkable, small mildly increased left basilar atelectasis or infiltrate  Cardiology  following for new onset A-fib, evaluation noted  Avoid NSAIDs, nephrotoxic agents.   We will follow and coordinate with team  Monitor closely for renal recovery

## 2024-06-18 NOTE — PROGRESS NOTES
"[facility] Clinical Pharmacy Services: Vancomycin Monitoring Note    Dilip Verma is a 74 y.o. male who is on day 6/7 of pharmacy to dose vancomycin for SSTI.    Previous Vancomycin Dose: Intermittent pulse dosing   Updated Cultures and Sensitivities:   -5/30 Urine cx: serratia marcescens   -5/30 Blood cx (2/2): NGTD  -6/4 MRSA PCR: NEGative   Results from last 7 days   Lab Units 06/18/24  0639 06/17/24  0559 06/16/24  0546   VANCOMYCIN RM mcg/mL 20.60 13.20 16.40     Vitals/Labs  Ht: 172.7 cm (68\"); Wt: 95.9 kg (211 lb 6.7 oz)   Temp Readings from Last 1 Encounters:   06/18/24 98.6 °F (37 °C) (Oral)     Estimated Creatinine Clearance: 20.7 mL/min (A) (by C-G formula based on SCr of 3.46 mg/dL (H)).   CKD + AMENA    Results from last 7 days   Lab Units 06/18/24  0639 06/17/24  0600 06/17/24  0559 06/16/24  0546   CREATININE mg/dL 3.46*  --  5.23* 4.81*   WBC 10*3/mm3 6.79 7.06  --  7.97     Assessment/Plan  HD completed yesterday. Random drug level this AM returned at 20.6 mg/L ~14 hours post-dose. No supplemental dose is indicated at this time; however, if nephrology decides dialysis is indicated, the plan will be adjusted accordingly.    Current Vancomycin Dose: No supplemental dose indicated   Next Level Date and Time: Vanc Random on 6/19 with AM labs   We will continue to monitor patient changes and renal function     Thank you for involving pharmacy in this patient's care. Please contact pharmacy with any questions or concerns.       Anna Marie Albarado, [facility]  Clinical Pharmacist    "

## 2024-06-18 NOTE — PLAN OF CARE
Goal Outcome Evaluation:      Pt A&O to person and situation this shift and easily re orientated to time and place. Pt was able to speak coherently this shift. Mepilex applied to bony prominences of heels and leg dressing changed( no change from 6/16). Pt assisted w/ turning and weight shifting. Water intake encouraged. Urine output well and no new skin issues noted. Heels elevated off of bed. Heparin gtt monitored. F/C care and CHG bath performed.       Safety rounds performed and bed left in low position, alarms active and audible. Spouse remained at bedside.

## 2024-06-18 NOTE — PROGRESS NOTES
[facility]     LOS: 19 days     Subjective:    CC:Altered Mental Status    DIET:  Diet Order   Procedures    Diet: Regular/House, Diabetic, Cardiac; Healthy Heart (2-3 Na+); Consistent Carbohydrate; Texture: Mechanical Ground (NDD 2); Fluid Consistency: Thin (IDDSI 0)     Mental status is much improved, answers questions surprisingly well, wife however provides medical history and updates of his progress    Objective:    Vital Signs:  Temp:  [97.7 °F (36.5 °C)-98.6 °F (37 °C)] 97.9 °F (36.6 °C)  Heart Rate:  [59-75] 61  Resp:  [18] 18  BP: (114-174)/(53-77) 174/77    SpO2:  [91 %-100 %] 100 %  on  Flow (L/min):  [2-3] 2;   Device (Oxygen Therapy): nasal cannula  Body mass index is 32.15 kg/m².    Physical Exam  Constitutional:       Appearance: Normal appearance.   HENT:      Head: Normocephalic and atraumatic.   Cardiovascular:      Rate and Rhythm: Normal rate and regular rhythm.      Heart sounds: No murmur heard.     No friction rub.   Pulmonary:      Effort: Pulmonary effort is normal.      Breath sounds: Normal breath sounds.   Abdominal:      General: Bowel sounds are normal. There is no distension.      Palpations: Abdomen is soft.      Tenderness: There is no abdominal tenderness.   Skin:     General: Skin is warm and dry.   Neurological:      Mental Status: He is alert.   Psychiatric:         Mood and Affect: Mood normal.         Behavior: Behavior normal.         Results Review:    Glucose   Date Value Ref Range Status   06/18/2024 111 (H) 65 - 99 mg/dL Final   06/17/2024 141 (H) 65 - 99 mg/dL Final   06/16/2024 159 (H) 65 - 99 mg/dL Final     Results from last 7 days   Lab Units 06/18/24  0639   WBC 10*3/mm3 6.79   HEMOGLOBIN g/dL 10.5*   HEMATOCRIT % 31.9*   PLATELETS 10*3/mm3 256     Results from last 7 days   Lab Units 06/18/24  0639   SODIUM mmol/L 138   POTASSIUM mmol/L 4.4   CHLORIDE mmol/L 105   CO2 mmol/L 20.6*   BUN mg/dL 27*   CREATININE mg/dL 3.46*   CALCIUM mg/dL  "8.2*   GLUCOSE mg/dL 111*     Results from last 7 days   Lab Units 06/18/24  1422 06/14/24  0655 06/13/24 2019   INR   --   --  1.17*   APTT seconds 57.5*   < > 48.4*    < > = values in this interval not displayed.             Cultures:  No results found for: \"BLOODCX\", \"URINECX\", \"WOUNDCX\", \"MRSACX\", \"RESPCX\", \"STOOLCX\"    I have reviewed daily medications and changes in CPOE    Scheduled meds  amiodarone, 200 mg, Oral, Q24H  [Held by provider] apixaban, 5 mg, Oral, Q12H  aspirin, 81 mg, Oral, Daily  brimonidine, 1 drop, Both Eyes, BID  DULoxetine, 30 mg, Oral, Daily  guaiFENesin, 600 mg, Oral, Q12H  insulin lispro, 2-9 Units, Subcutaneous, 4x Daily AC & at Bedtime  ipratropium-albuterol, 3 mL, Nebulization, 4x Daily - RT  latanoprost, 1 drop, Both Eyes, Nightly  Menthol-Zinc Oxide, 1 Application, Topical, Q12H  metoprolol tartrate, 12.5 mg, Oral, Q12H  midodrine, 10 mg, Oral, TID AC  OLANZapine, 5 mg, Oral, Nightly  senna-docusate sodium, 2 tablet, Oral, BID  sodium bicarbonate, 650 mg, Oral, TID  sodium chloride, 10 mL, Intravenous, Q12H  sodium chloride, 10 mL, Intravenous, Q12H  sodium chloride, 4 mL, Nebulization, BID - RT  terazosin, 1 mg, Oral, Nightly  thiamine, 100 mg, Intravenous, Daily  Vancomycin Pharmacy Intermittent/Pulse Dosing, , Does not apply, Daily        heparin, 10.4 Units/kg/hr, Last Rate: 16.4 Units/kg/hr (06/18/24 1702)  Pharmacy to dose vancomycin,       PRN meds    acetaminophen **OR** acetaminophen **OR** acetaminophen    senna-docusate sodium **AND** polyethylene glycol **AND** bisacodyl **AND** bisacodyl    dextrose    dextrose    glucagon (human recombinant)    heparin (porcine)    nitroglycerin    OLANZapine    ondansetron    Pharmacy to dose vancomycin    sodium chloride    sodium chloride    sodium chloride    sodium chloride        Acute UTI (urinary tract infection)    Type 2 diabetes mellitus with hyperglycemia, with long-term current use of insulin    CAD (coronary artery " disease)    Hx of CABG    Chronic diastolic heart failure    CSA (central sleep apnea)    HTN (hypertension)    History of stroke    CKD (chronic kidney disease) stage 3, GFR 30-59 ml/min    Peripheral arterial disease    Sepsis    Hyperkalemia    Metabolic encephalopathy    AMENA (acute kidney injury)    Paroxysmal atrial fibrillation        Assessment/Plan:    74 y.o. male admitted with Acute UTI (urinary tract infection).     Assessment and plan  Acute UTI/LLE Cellulitis  - urine culture grew Serratia  - completed 9d of abx-cefepime was discontinued 6/8 due to high suspicion of this causing toxic encephalopathy.  -Per ID:Despite the fact that he is negative MRSA screen his right arm-continue with IV vancomycin for phlebitis and secondary cellulitis of the left arm as it is showing improvement.  Continue antibiotic treatment for 7 days with continued local care elevation and monitoring and local care of the scabbed wound on the medial aspect of the midforearm.      HTNCAD/PAD/New Onset Afib (PAF)/Chronic Diastolic CHF  - BP acceptable, no anginal symptoms, in NSR  - continue on current regimen  - on AC with eliquis but not taking PO now due to severe encephalopathy-hold eliquis and ask pharmacy to dose heparin, likely transition back to Eliquis tomorrow  - appreciate cardiology recs     Type 2 DM  - had hypoglycemia, improved off of lantus  - continue D5W, getting thiamine also  - continue coverage with ssi/hypoglycemia protocol     AMENA  -Nephrology on board, follow management recommendations.  -Initiated on dialysis, mental status improving after dialysis management.     Hypoxia  - CXR noted, improved after additional dose of bumex  - monitor and encourage pulmonary toilet as able     Delirium/Toxic Encephalopathy  - continue on scheduled and PRN zyprexa  - he is current with palliatus at home and takes PRN tramadol, this has been restarted     Hypokalemia  - replace K+       Que Shah MD  06/18/24  17:20  EDT

## 2024-06-18 NOTE — THERAPY TREATMENT NOTE
Patient Name: Dilip Verma  : 1950    MRN: [id number]                              Today's Date: 2024       Admit Date: 2024    Visit Dx:     ICD-10-CM ICD-9-CM   1. Acute respiratory failure with hypoxia  J96.01 518.81   2. Sepsis without acute organ dysfunction, due to unspecified organism  A41.9 038.9     995.91   3. Hyperkalemia  E87.5 276.7   4. Metabolic encephalopathy  G93.41 348.31   5. Acute UTI  N39.0 599.0   6. Hyperglycemia due to diabetes mellitus  E11.65 250.02   7. Chronic heart failure with preserved ejection fraction (HFpEF)  I50.32 428.9   8. Acute UTI (urinary tract infection)  N39.0 599.0     Patient Active Problem List   Diagnosis    Anxiety    Colon polyp    Type 2 diabetes mellitus with hyperglycemia, with long-term current use of insulin    Erectile dysfunction    Hyperlipidemia    Type 2 acute myocardial infarction    CAD (coronary artery disease)    Hx of CABG    Chronic diastolic heart failure    Hypersomnia due to medical condition    CSA (central sleep apnea)    Periodic breathing    HTN (hypertension)    History of stroke    CKD (chronic kidney disease) stage 3, GFR 30-59 ml/min    Urinary retention    Acute on chronic renal failure    Acute UTI (urinary tract infection)    Acute on chronic diastolic (congestive) heart failure    Bacteriuria    Rectal pain    Status post total replacement of hip    Vitamin D deficiency    Post-acute COVID-19 syndrome    Insulin dose changed    Arthropathy associated with neurological disorder    Personal history of colonic polyps    Type 2 diabetes mellitus with diabetic neuropathy, with long-term current use of insulin    Cervical spinal stenosis    Abscess of skin    Overweight    Cervical stenosis of spine    Spinal stenosis, lumbar region, without neurogenic claudication    Medically noncompliant    Chronic heart failure with preserved ejection fraction (HFpEF)    Carotid stenosis    Peripheral arterial disease    Sepsis     Hyperkalemia    Metabolic encephalopathy    AMENA (acute kidney injury)    Paroxysmal atrial fibrillation     Past Medical History:   Diagnosis Date    AMENA (acute kidney injury) 12/03/2020    Alcoholism 1989    not since 1998    CORI positive     Anemia     Anxiety     Ataxia     Atypical chest pain 04/19/2022    SEEN AT [facility] ER    Balance disorder     Carotid stenosis 3/28/2024    Cataract     BILATERAL, S/P EXTRACTION    Cervical radiculopathy 11/11/2019    SEEN AT  [facility] ER    Cervical spinal cord compression     Chronic diastolic (congestive) heart failure     Chronic kidney disease     STAGE 3, FOLLOWED BY DR. VIVAR    Chronic pancreatitis     Closed left subtrochanteric femur fracture 12/01/2020    ADMITTED TO [facility]    Closed nondisplaced intertrochanteric fracture of left femur 12/01/2020    ADMITTED TO [facility]    Colon polyps     FOLLOWED BY DR. AVTAR JEONG    Constipation     Contracture, right hand     Coronary artery disease     CABG 7/2019    COVID-19 07/2022    DDD (degenerative disc disease), cervical     Diabetes mellitus, type II     IDDM    Diabetic retinopathy     Difficulty walking     Dysphagia     Elevated brain natriuretic peptide (BNP) level 08/2014    Elevated LFTs 03/2021    Erectile dysfunction     Fissure, anal 2022    Foot drop     Fuchs' corneal dystrophy of right eye     Glaucoma     BILATERAL    History of alcohol abuse     Hyperlipidemia     Hypersomnia     Hypertension     Hypertensive urgency 02/25/2020    ADMITTED TO [facility]    Insomnia     Kidney stones     LV dysfunction 06/2016    Lyme disease     Lymphadenopathy syndrome 05/2021    Macular edema     BILATERAL    Myocardial infarction 11/05/2019    NSTEMI, ADMITTED TO [facility]    Myocardial infarction 07/12/2019    NSTEMI, ADMITTED TO [facility]    Neuropathy in diabetes     Non-celiac gluten sensitivity     Orthostasis     Osteoporosis     Oxygen dependent     PAD (peripheral artery disease)     Paroxysmal atrial fibrillation 6/6/2024    PNA (pneumonia)  "06/2016    LEFT LOBE    Polyneuropathy     PTSD (post-traumatic stress disorder)     Pulmonary hypertension     Pulmonary nodule     Senile ectropion of both lower eyelids 02/2022    Sepsis 12/16/2020    D/T UTI, ADMITTED TO [facility]    Sleep apnea     STATES DOESN'T USE BIPAP OR CPAP    Spinal stenosis in cervical region     SEVERE-LIMITED ROM    Stroke 2012    \"slight stroke\"    Syncope and collapse 07/06/2019    ADMITTED TO [facility]    Urinary retention 04/05/2021    SEEN AT [facility] ER    Vision loss     Vitamin D deficiency      Past Surgical History:   Procedure Laterality Date    CARDIAC CATHETERIZATION N/A 07/15/2019    Procedure: Coronary angiography;  Surgeon: Carrie Price MD;  Location:  [facility];  Service: Cardiovascular    CARDIAC CATHETERIZATION N/A 07/15/2019    Procedure: Left Heart Cath;  Surgeon: Carrie Price MD;  Location:  [facility];  Service: Cardiovascular    CARDIAC CATHETERIZATION N/A 07/15/2019    Procedure: Left ventriculography;  Surgeon: Carrie Price MD;  Location:  [facility];  Service: Cardiovascular    CARDIAC CATHETERIZATION  07/15/2019    Procedure: Functional Flow [place];  Surgeon: Carrie Price MD;  Location:  [facility];  Service: Cardiovascular    CARDIAC CATHETERIZATION N/A 11/06/2019    Procedure: Right and Left Heart Cath;  Surgeon: Mya Smith MD;  Location:  [facility];  Service: Cardiovascular    CARDIAC CATHETERIZATION N/A 11/06/2019    Procedure: Coronary angiography;  Surgeon: Mya Smith MD;  Location:  [facility];  Service: Cardiovascular    CARDIAC SURGERY      CATARACT EXTRACTION Left 2014    CATARACT EXTRACTION Right 11/2016    PHACO/IOL, DR. LEN DENTON    COLONOSCOPY N/A 03/16/2023    ENTIRE COLON WNL, RESCOPE IN 5 YRS, DR. LINDA LIZARRAGA AT [facility]    COLONOSCOPY W/ POLYPECTOMY N/A 01/02/2015    A FEW DIVERTICULA IN SIGMOID, 6 MM TUBULOVILLOUS " ADENOMA POLYP IN RECTUM, SMALL HEMORRHOIDS, MELANOSIS COLI, DR. AVTAR JEONG AT [facility]    CORONARY ARTERY BYPASS GRAFT N/A 07/18/2019    Procedure: INTRAOPERATIVE SHOAIB; STERNOTOMY CORONARY ARTERY BYPASS x 3  USING LEFT INTERNAL MAMMARY ARTERY GRAFT UTILIZING ENDOSCOPICALLY HARVESTED RIGHT GREATER SAPHENOUS VEIN AND PRP.;  Surgeon: Bill Devi MD;  Location: [facility] OR;  Service: Cardiothoracic    CYSTOSCOPY BLADDER STONE LITHOTRIPSY N/A     DENTAL PROCEDURE Bilateral     3 surgeries inder implants    FEMUR IM NAILING/RODDING Left 12/03/2020    Procedure: LEFT HIP INTRAMEDULLARY NAIL;  Surgeon: Niraj Ravi MD;  Location: [facility] OR;  Service: Orthopedic Spine;  Laterality: Left;    INCISION AND DRAINAGE PERIRECTAL ABSCESS N/A 10/04/2023    Procedure: Incision and drainage of perianal and buttock abscess;  Surgeon: Herbie Villatoro MD;  Location: [facility] OR;  Service: General;  Laterality: N/A;    INGUINAL HERNIA REPAIR Bilateral     TOENAIL EXCISION  06/2022    TONSILLECTOMY Bilateral     TOTAL HIP ARTHROPLASTY REVISION Left 01/11/2022    Procedure: TOTAL HIP ARTHROPLASTY REVISION- POSTERIOR;  Surgeon: Jurgen Candelaria II, MD;  Location: [facility] OR;  Service: Orthopedics;  Laterality: Left;    VASECTOMY N/A       General Information       Row Name 06/18/24 1454          OT Time and Intention    Document Type therapy note (daily note) (P)   -     Mode of Treatment individual therapy;occupational therapy (P)   -       Row Name 06/18/24 1454          General Information    Patient Profile Reviewed yes (P)   -     Existing Precautions/Restrictions fall;oxygen therapy device and L/min (P)   -     Barriers to Rehab medically complex;cognitive status (P)   -       Row Name 06/18/24 1457          Cognition    Orientation Status (Cognition) oriented to;person;place (P)   -       Row Name 06/18/24 1456          Safety Issues, Functional Mobility    Safety Issues  Affecting Function (Mobility) insight into deficits/self-awareness;judgment;problem-solving;safety precaution awareness;safety precautions follow-through/compliance;sequencing abilities (P)   -     Impairments Affecting Function (Mobility) balance;cognition;endurance/activity tolerance;strength;coordination;motor control (P)   -MF     Cognitive Impairments, Mobility Safety/Performance attention;insight into deficits/self-awareness;judgment;problem-solving/reasoning;safety precaution awareness;safety precaution follow-through;sequencing abilities (P)   -               User Key  (r) = Recorded By, (t) = Taken By, (c) = Cosigned By      Initials Name Provider Type     Annita Tabares OT Student OT Student                     Mobility/ADL's       Row Name 06/18/24 6017          Bed Mobility    Supine-Sit [place] (Bed Mobility) moderate assist (50% patient effort);1 person assist;verbal cues (P)   -     Sit-Supine [place] (Bed Mobility) moderate assist (50% patient effort);verbal cues;2 person assist (P)   -     Bed Mobility, Safety Issues cognitive deficits limit understanding;decreased use of arms for pushing/pulling;decreased use of legs for bridging/pushing;impaired trunk control for bed mobility (P)   -     Assistive Device (Bed Mobility) bed rails;draw sheet;head of bed elevated (P)   -       Row Name 06/18/24 1455          Transfers    Transfers sit-stand transfer;stand-sit transfer (P)   -       Row Name 06/18/24 1454          Sit-Stand Transfer    Sit-Stand [place] (Transfers) moderate assist (50% patient effort);2 person assist (P)   -     Assistive Device (Sit-Stand Transfers) walker, front-wheeled (P)   -     Comment, (Sit-Stand Transfer) x3 from EOB (P)   -       Row Name 06/18/24 1455          Stand-Sit Transfer    Stand-Sit [place] (Transfers) moderate assist (50% patient effort);2 person assist;verbal cues (P)   -     Assistive Device (Stand-Sit Transfers)  walker, front-wheeled (P)   -       Row Name 06/18/24 1455          Functional Mobility    Functional Mobility- Ind. Level minimum assist (75% patient effort);2 person assist required (P)   -     Functional Mobility- Device walker, front-wheeled (P)   -     Functional Mobility- Safety Issues balance decreased during turns;step length decreased;supplemental O2 (P)   -     Functional Mobility- Comment able to take side steps at bed and ambulate household distance with rwx. Unsteady but no fatigued noted (P)   -     Patient was able to Ambulate yes (P)   -       Row Name 06/18/24 1455          Activities of Daily Living    BADL Assessment/Intervention lower body dressing (P)   -       Row Name 06/18/24 1455          Lower Body Dressing Assessment/Training    [place] Level (Lower Body Dressing) lower body dressing skills;dependent (less than 25% patient effort);don;socks;shoes/slippers;doff (P)   -     Position (Lower Body Dressing) edge of bed sitting;unsupported sitting (P)   -               User Key  (r) = Recorded By, (t) = Taken By, (c) = Cosigned By      Initials Name Provider Type     Annita Tabares OT Student OT Student                   Obj/Interventions       Row Name 06/18/24 1458          Motor Skills    Motor Skills coordination;functional endurance (P)   -     Functional Endurance improving but limited d/t weakness (P)   -       Row Name 06/18/24 1452          Balance    Balance Assessment sitting static balance;sitting dynamic balance;sit to stand dynamic balance;standing static balance;standing dynamic balance (P)   -     Static Sitting Balance standby assist;1-person assist (P)   -     Dynamic Sitting Balance contact guard;minimal assist;1-person assist (P)   -     Position, Sitting Balance sitting edge of bed (P)   -     Sit to Stand Dynamic Balance moderate assist;2-person assist (P)   -     Static Standing Balance moderate assist;2-person assist (P)   -      Dynamic Standing Balance moderate assist;2-person assist (P)   -     Position/Device Used, Standing Balance walker, front-wheeled (P)   -MF     Balance Interventions sitting;standing;sit to stand;supported;static;dynamic;moderate challenge (P)   -     Comment, Balance improved sitting/standing balance this date; able to maintain without many verbal cues (P)   -MF               User Key  (r) = Recorded By, (t) = Taken By, (c) = Cosigned By      Initials Name Provider Type    Annita Salinas OT Student OT Student                   Goals/Plan    No documentation.                  Clinical Impression       Row Name 06/18/24 1502          Pain Assessment    Pretreatment Pain Rating 0/10 - no pain (P)   -MF     Posttreatment Pain Rating 0/10 - no pain (P)   -       Row Name 06/18/24 1502          Plan of Care Review    Plan of Care Reviewed With patient;spouse (P)   -     Progress improving (P)   -     Outcome Evaluation Pt seen for OT treatment this date. Pt in bed upon arrival and much more alert. Pt able to answer orientation questions to person and place and displayed increased participation in conversation. Pt demo'd mod A x1 bed mobility, mod A x2 STS transfer, and CGA-min A x2 functional mobility with rwx. Pt showed increased ability to follow commands and sequencing skills during task. Pt took a few side steps at EOB and household distance forward steps. Pt unsteady and mild LOB with turns but overall functional endurance and activity tolerance is improving. Pt still requiring high assist with most ADLs including total assist for LBD skills. OT services would benefit pt to address (I) and safety with ADLs and functional mobility. Rec d/c SNF (P)   -       Row Name 06/18/24 1502          Therapy Assessment/Plan (OT)    Rehab Potential (OT) good, to achieve stated therapy goals (P)   -     Criteria for Skilled Therapeutic Interventions Met (OT) yes;skilled treatment is necessary (P)   -      Therapy Frequency (OT) 5 times/wk (P)   -MF       Row Name 06/18/24 1502          Therapy Plan Review/Discharge Plan (OT)    Anticipated Discharge Disposition (OT) skilled nursing facility (P)   -       Row Name 06/18/24 1502          Vital Signs    O2 Delivery Pre Treatment supplemental O2 (P)   -MF     O2 Delivery Intra Treatment supplemental O2 (P)   -MF     O2 Delivery Post Treatment supplemental O2 (P)   -       Row Name 06/18/24 1502          Positioning and Restraints    Pre-Treatment Position in bed (P)   -MF     Post Treatment Position bed (P)   -MF     In Bed notified nsg;supine;call light within reach;exit alarm on (P)   -               User Key  (r) = Recorded By, (t) = Taken By, (c) = Cosigned By      Initials Name Provider Type    Annita Salinas OT Student OT Student                   Outcome Measures       Row Name 06/18/24 1508          How much help from another is currently needed...    Putting on and taking off regular lower body clothing? 1 (P)   -MF     Bathing (including washing, rinsing, and drying) 2 (P)   -MF     Toileting (which includes using toilet bed pan or urinal) 1 (P)   -MF     Putting on and taking off regular upper body clothing 2 (P)   -MF     Taking care of personal grooming (such as brushing teeth) 2 (P)   -MF     Eating meals 2 (P)   -MF     AM-PAC 6 Clicks Score (OT) 10 (P)   -       Row Name 06/18/24 1508          Functional Assessment    Outcome Measure Options AM-PAC 6 Clicks Daily Activity (OT) (P)   -               User Key  (r) = Recorded By, (t) = Taken By, (c) = Cosigned By      Initials Name Provider Type    Annita Salinas OT Student OT Student                    Occupational Therapy Education       Title: PT OT SLP Therapies (In Progress)       Topic: Occupational Therapy (Done)       Point: ADL training (Done)       Description:   Instruct learner(s) on proper safety adaptation and remediation techniques during self care or  transfers.   Instruct in proper use of assistive devices.                  Learning Progress Summary             Patient Acceptance, E,TB,D, VU by CB at 6/1/2024 2218    Acceptance, E, VU by  at 5/31/2024 1056   Family Acceptance, E,TB,D, VU by CB at 6/1/2024 2218    Acceptance, E, VU by  at 5/31/2024 1056                         Point: Home exercise program (Done)       Description:   Instruct learner(s) on appropriate technique for monitoring, assisting and/or progressing therapeutic exercises/activities.                  Learning Progress Summary             Patient Acceptance, E,TB,D, VU by CB at 6/1/2024 2218    Acceptance, E, VU by  at 5/31/2024 1056   Family Acceptance, E,TB,D, VU by  at 6/1/2024 2218    Acceptance, E, VU by  at 5/31/2024 1056                         Point: Precautions (Done)       Description:   Instruct learner(s) on prescribed precautions during self-care and functional transfers.                  Learning Progress Summary             Patient Acceptance, E,TB,D, VU by CB at 6/1/2024 2218    Acceptance, E, VU by  at 5/31/2024 1056   Family Acceptance, E,TB,D, VU by  at 6/1/2024 2218    Acceptance, E, VU by  at 5/31/2024 1056                         Point: Body mechanics (Done)       Description:   Instruct learner(s) on proper positioning and spine alignment during self-care, functional mobility activities and/or exercises.                  Learning Progress Summary             Patient Acceptance, E,TB,D, VU by  at 6/1/2024 2218    Acceptance, E, VU by  at 5/31/2024 1056   Family Acceptance, E,TB,D, VU by  at 6/1/2024 2218    Acceptance, E, VU by  at 5/31/2024 1056                                         User Key       Initials Effective Dates Name Provider Type Discipline     02/21/24 -  Murphy Herndon, LUC Registered Nurse Nurse     04/30/24 -  Annita Tabares, OT Student OT Student OT                  OT Recommendation and Plan  Therapy Frequency (OT): (P) 5  times/wk  Plan of Care Review  Plan of Care Reviewed With: (P) patient, spouse  Progress: (P) improving  Outcome Evaluation: (P) Pt seen for OT treatment this date. Pt in bed upon arrival and much more alert. Pt able to answer orientation questions to person and place and displayed increased participation in conversation. Pt demo'd mod A x1 bed mobility, mod A x2 STS transfer, and CGA-min A x2 functional mobility with rwx. Pt showed increased ability to follow commands and sequencing skills during task. Pt took a few side steps at EOB and household distance forward steps. Pt unsteady and mild LOB with turns but overall functional endurance and activity tolerance is improving. Pt still requiring high assist with most ADLs including total assist for LBD skills. OT services would benefit pt to address (I) and safety with ADLs and functional mobility. Rec d/c SNF     Time Calculation:   Evaluation Complexity (OT)  Review Occupational Profile/Medical/Therapy History Complexity: expanded/moderate complexity  Assessment, Occupational Performance/Identification of Deficit Complexity: 3-5 performance deficits  Clinical Decision Making Complexity (OT): detailed assessment/moderate complexity  Overall Complexity of Evaluation (OT): moderate complexity     Time Calculation- OT       Row Name 06/18/24 1509             Time Calculation- OT    OT Start Time 1349 (P)   -MF      OT Stop Time 1419 (P)   -      OT Time Calculation (min) 30 min (P)   -      Total Timed Code Minutes- OT 30 minute(s) (P)   -      OT Received On 06/18/24 (P)   -      OT - Next Appointment 06/19/24 (P)   -         Timed Charges    66366 - OT Therapeutic Activity Minutes 30 (P)   -MF         Total Minutes    Timed Charges Total Minutes 30 (P)   -MF       Total Minutes 30 (P)   -                User Key  (r) = Recorded By, (t) = Taken By, (c) = Cosigned By      Initials Name Provider Type    Annita Salinas OT Student OT Student                   Therapy Charges for Today       Code Description Service Date Service Provider Modifiers Qty    [id number]  OT THERAPEUTIC ACT EA 15 MIN 6/18/2024 Annita Tabares OT Student GO 2                 Annita Tabares OT Student  6/18/2024

## 2024-06-18 NOTE — PROGRESS NOTES
Name: Dilip Verma ADMIT: 2024   : 1950  PCP: Fernando Camargo DO    MRN: [id number] LOS: 18 days   AGE/SEX: 74 y.o. male  ROOM: [facility]     Subjective   Subjective   Patient is seen at bedside, no new complaints.       Objective   Objective   Vital Signs  Temp:  [97 °F (36.1 °C)-98.4 °F (36.9 °C)] 98.4 °F (36.9 °C)  Heart Rate:  [60-73] 60  Resp:  [18-20] 18  BP: (114-150)/(53-80) 114/53  SpO2:  [92 %-95 %] 95 %  on  Flow (L/min):  [2-3] 2;   Device (Oxygen Therapy): nasal cannula;humidified  Body mass index is 32.15 kg/m².  Physical Exam  Vitals and nursing note reviewed.   Constitutional:       General: He is not in acute distress.     Appearance: He is ill-appearing (chronically). He is not toxic-appearing or diaphoretic.   HENT:      Head: Normocephalic and atraumatic.      Nose: Nose normal.      Mouth/Throat:      Mouth: Mucous membranes are moist.      Pharynx: Oropharynx is clear.   Eyes:      Conjunctiva/sclera: Conjunctivae normal.      Pupils: Pupils are equal, round, and reactive to light.   Cardiovascular:      Rate and Rhythm: Normal rate and regular rhythm.      Pulses: Normal pulses.   Pulmonary:      Effort: Pulmonary effort is normal.      Breath sounds: Normal breath sounds.   Abdominal:      General: Bowel sounds are normal.      Palpations: Abdomen is soft.      Tenderness: There is no abdominal tenderness.   Musculoskeletal:         General: Swelling (1-2+ BLE) present.      Cervical back: Neck supple.   Skin:     General: Skin is warm and dry.      Copied text material from yesterday's note has been reviewed for appropriate changes and remains accurate as of 24.      Results Review     I reviewed the patient's new clinical results.  Results from last 7 days   Lab Units 24  0600 24  0546 06/15/24  0703 24  0655   WBC 10*3/mm3 7.06 7.97 7.52 8.80   HEMOGLOBIN g/dL 10.1* 10.4* 10.7* 10.9*   PLATELETS 10*3/mm3 250 263 268 243     Results from last 7 days    Lab Units 06/17/24  0559 06/16/24  0546 06/15/24  0703 06/14/24  0655   SODIUM mmol/L 137 136 136 132*   POTASSIUM mmol/L 4.5 4.4 4.1 4.2   CHLORIDE mmol/L 105 101 100 98   CO2 mmol/L 22.0 20.5* 20.9* 20.2*   BUN mg/dL 53* 44* 66* 54*   CREATININE mg/dL 5.23* 4.81* 6.93* 5.83*   GLUCOSE mg/dL 141* 159* 143* 168*   EGFR mL/min/1.73 10.8* 11.9* 7.7* 9.5*     Results from last 7 days   Lab Units 06/12/24  1739   ALBUMIN g/dL 2.7*     Results from last 7 days   Lab Units 06/17/24  0559 06/16/24  0546 06/15/24  0703 06/14/24  0655 06/13/24  0532 06/12/24  1739   CALCIUM mg/dL 8.0* 8.1* 8.2* 8.5*   < >  --    ALBUMIN g/dL  --   --   --   --   --  2.7*    < > = values in this interval not displayed.     Results from last 7 days   Lab Units 06/15/24  1611   PROCALCITONIN ng/mL 0.46*   LACTATE mmol/L 0.8     Glucose   Date/Time Value Ref Range Status   06/17/2024 1607 151 (H) 70 - 130 mg/dL Final   06/17/2024 1216 105 70 - 130 mg/dL Final   06/17/2024 0607 125 70 - 130 mg/dL Final   06/16/2024 2109 172 (H) 70 - 130 mg/dL Final   06/16/2024 1626 185 (H) 70 - 130 mg/dL Final   06/16/2024 1112 186 (H) 70 - 130 mg/dL Final   06/16/2024 0548 152 (H) 70 - 130 mg/dL Final       No radiology results for the last day    I have personally reviewed all medications:  Scheduled Medications  amiodarone, 200 mg, Oral, Q24H  [Held by provider] apixaban, 5 mg, Oral, Q12H  aspirin, 81 mg, Oral, Daily  brimonidine, 1 drop, Both Eyes, BID  DULoxetine, 30 mg, Oral, Daily  guaiFENesin, 600 mg, Oral, Q12H  insulin lispro, 2-9 Units, Subcutaneous, 4x Daily AC & at Bedtime  ipratropium-albuterol, 3 mL, Nebulization, 4x Daily - RT  latanoprost, 1 drop, Both Eyes, Nightly  Menthol-Zinc Oxide, 1 Application, Topical, Q12H  metoprolol tartrate, 12.5 mg, Oral, Q12H  midodrine, 10 mg, Oral, TID AC  OLANZapine, 5 mg, Oral, Nightly  senna-docusate sodium, 2 tablet, Oral, BID  sodium bicarbonate, 650 mg, Oral, TID  sodium chloride, 10 mL, Intravenous,  Q12H  sodium chloride, 10 mL, Intravenous, Q12H  sodium chloride, 4 mL, Nebulization, BID - RT  terazosin, 1 mg, Oral, Nightly  thiamine, 100 mg, Intravenous, Daily  Vancomycin Pharmacy Intermittent/Pulse Dosing, , Does not apply, Daily    Infusions  heparin, 10.4 Units/kg/hr, Last Rate: 17.4 Units/kg/hr (06/17/24 1406)  Pharmacy to dose vancomycin,     Diet  Diet: Regular/House, Diabetic, Cardiac; Healthy Heart (2-3 Na+); Consistent Carbohydrate; Texture: Mechanical Ground (NDD 2); Fluid Consistency: Thin (IDDSI 0)    I have personally reviewed:  [x]  Laboratory   [x]  Microbiology   [x]  Radiology   [x]  EKG/Telemetry  [x]  Cardiology/Vascular   []  Pathology    []  Records       Assessment/Plan     Active Hospital Problems    Diagnosis  POA    **Acute UTI (urinary tract infection) [N39.0]  Yes    Paroxysmal atrial fibrillation [I48.0]  Yes    AMENA (acute kidney injury) [N17.9]  Yes    Sepsis [A41.9]  Yes    Hyperkalemia [E87.5]  Yes    Metabolic encephalopathy [G93.41]  Yes    Peripheral arterial disease [I73.9]  Yes    History of stroke [Z86.73]  Not Applicable    CKD (chronic kidney disease) stage 3, GFR 30-59 ml/min [N18.30]  Yes    HTN (hypertension) [I10]  Yes    CSA (central sleep apnea) [G47.31]  Yes    Chronic diastolic heart failure [I50.32]  Yes    Hx of CABG [Z95.1]  Not Applicable    CAD (coronary artery disease) [I25.10]  Yes    Type 2 diabetes mellitus with hyperglycemia, with long-term current use of insulin [E11.65, Z79.4]  Not Applicable      Resolved Hospital Problems   No resolved problems to display.       74 y.o. male admitted with Acute UTI (urinary tract infection).    Assessment and plan  Acute UTI/LLE Cellulitis  - urine culture grew Serratia  - completed 9d of abx-cefepime was discontinued 6/8 due to high suspicion of this causing toxic encephalopathy.  - doing well off of abx  - appreciate ID recs     HTNCAD/PAD/New Onset Afib (PAF)/Chronic Diastolic CHF  - BP acceptable, no anginal  symptoms, in NSR  - continue on current regimen  - on AC with eliquis but not taking PO now due to severe encephalopathy-hold eliquis and ask pharmacy to dose lovenox  - appreciate cardiology recs     Type 2 DM  - had hypoglycemia, improved off of lantus  - continue D5W, getting thiamine also  - continue coverage with ssi/hypoglycemia protocol     AMENA  -Nephrology on board, follow management recommendations.  -Initiated on dialysis, mental status improving after dialysis management.     Hypoxia  - CXR noted, improved after additional dose of bumex  - monitor and encourage pulmonary toilet as able     Delirium/Toxic Encephalopathy  - continue on scheduled and PRN zyprexa  - he is current with palliatus at home and takes PRN tramadol, this has been restarted     Hypokalemia  - replace K+             Tony Negrete MD  [facility]  06/17/24  20:02 EDT

## 2024-06-19 LAB
ANION GAP SERPL CALCULATED.3IONS-SCNC: 10 MMOL/L (ref 5–15)
APTT PPP: 81.3 SECONDS (ref 22.7–35.4)
BUN SERPL-MCNC: 35 MG/DL (ref 8–23)
BUN/CREAT SERPL: 9.6 (ref 7–25)
CALCIUM SPEC-SCNC: 8.3 MG/DL (ref 8.6–10.5)
CHLORIDE SERPL-SCNC: 105 MMOL/L (ref 98–107)
CO2 SERPL-SCNC: 24 MMOL/L (ref 22–29)
CREAT SERPL-MCNC: 3.64 MG/DL (ref 0.76–1.27)
EGFRCR SERPLBLD CKD-EPI 2021: 16.7 ML/MIN/1.73
GLUCOSE BLDC GLUCOMTR-MCNC: 161 MG/DL (ref 70–130)
GLUCOSE BLDC GLUCOMTR-MCNC: 163 MG/DL (ref 70–130)
GLUCOSE BLDC GLUCOMTR-MCNC: 188 MG/DL (ref 70–130)
GLUCOSE BLDC GLUCOMTR-MCNC: 197 MG/DL (ref 70–130)
GLUCOSE SERPL-MCNC: 135 MG/DL (ref 65–99)
POTASSIUM SERPL-SCNC: 4.2 MMOL/L (ref 3.5–5.2)
SODIUM SERPL-SCNC: 139 MMOL/L (ref 136–145)
VANCOMYCIN SERPL-MCNC: 15.2 MCG/ML (ref 5–40)
WHOLE BLOOD HOLD SPECIMEN: NORMAL

## 2024-06-19 PROCEDURE — 94799 UNLISTED PULMONARY SVC/PX: CPT

## 2024-06-19 PROCEDURE — 80048 BASIC METABOLIC PNL TOTAL CA: CPT

## 2024-06-19 PROCEDURE — 25010000002 THIAMINE HCL 200 MG/2ML SOLUTION: Performed by: INTERNAL MEDICINE

## 2024-06-19 PROCEDURE — 94761 N-INVAS EAR/PLS OXIMETRY MLT: CPT

## 2024-06-19 PROCEDURE — 82948 REAGENT STRIP/BLOOD GLUCOSE: CPT

## 2024-06-19 PROCEDURE — 25810000003 SODIUM CHLORIDE 0.9 % SOLUTION 250 ML FLEX CONT: Performed by: INTERNAL MEDICINE

## 2024-06-19 PROCEDURE — 97530 THERAPEUTIC ACTIVITIES: CPT

## 2024-06-19 PROCEDURE — 85730 THROMBOPLASTIN TIME PARTIAL: CPT | Performed by: INTERNAL MEDICINE

## 2024-06-19 PROCEDURE — 25010000002 VANCOMYCIN 1 G RECONSTITUTED SOLUTION 1 EACH VIAL: Performed by: INTERNAL MEDICINE

## 2024-06-19 PROCEDURE — 25010000002 HEPARIN (PORCINE) 25000-0.45 UT/250ML-% SOLUTION: Performed by: INTERNAL MEDICINE

## 2024-06-19 PROCEDURE — 80202 ASSAY OF VANCOMYCIN: CPT | Performed by: HOSPITALIST

## 2024-06-19 PROCEDURE — 92526 ORAL FUNCTION THERAPY: CPT

## 2024-06-19 PROCEDURE — 97116 GAIT TRAINING THERAPY: CPT

## 2024-06-19 PROCEDURE — 94760 N-INVAS EAR/PLS OXIMETRY 1: CPT

## 2024-06-19 PROCEDURE — 94664 DEMO&/EVAL PT USE INHALER: CPT

## 2024-06-19 PROCEDURE — 63710000001 INSULIN LISPRO (HUMAN) PER 5 UNITS: Performed by: INTERNAL MEDICINE

## 2024-06-19 RX ORDER — MIDODRINE HYDROCHLORIDE 5 MG/1
5 TABLET ORAL 3 TIMES DAILY PRN
Status: DISCONTINUED | OUTPATIENT
Start: 2024-06-19 | End: 2024-06-25 | Stop reason: HOSPADM

## 2024-06-19 RX ORDER — POLYETHYLENE GLYCOL 3350 17 G/17G
17 POWDER, FOR SOLUTION ORAL DAILY
Status: DISCONTINUED | OUTPATIENT
Start: 2024-06-19 | End: 2024-06-22

## 2024-06-19 RX ADMIN — Medication 1 APPLICATION: at 08:49

## 2024-06-19 RX ADMIN — IPRATROPIUM BROMIDE AND ALBUTEROL SULFATE 3 ML: .5; 3 SOLUTION RESPIRATORY (INHALATION) at 20:14

## 2024-06-19 RX ADMIN — Medication 4 ML: at 07:04

## 2024-06-19 RX ADMIN — BRIMONIDINE TARTRATE 1 DROP: 2 SOLUTION OPHTHALMIC at 21:54

## 2024-06-19 RX ADMIN — SODIUM BICARBONATE 650 MG: 650 TABLET, ORALLY DISINTEGRATING ORAL at 17:01

## 2024-06-19 RX ADMIN — HEPARIN SODIUM 16.37 UNITS/KG/HR: 10000 INJECTION, SOLUTION INTRAVENOUS at 10:18

## 2024-06-19 RX ADMIN — METOPROLOL TARTRATE 12.5 MG: 25 TABLET, FILM COATED ORAL at 08:48

## 2024-06-19 RX ADMIN — ASPIRIN 81 MG: 81 TABLET, CHEWABLE ORAL at 08:48

## 2024-06-19 RX ADMIN — AMIODARONE HYDROCHLORIDE 200 MG: 200 TABLET ORAL at 08:48

## 2024-06-19 RX ADMIN — GUAIFENESIN 600 MG: 600 TABLET, EXTENDED RELEASE ORAL at 08:48

## 2024-06-19 RX ADMIN — MIDODRINE HYDROCHLORIDE 10 MG: 5 TABLET ORAL at 08:48

## 2024-06-19 RX ADMIN — DULOXETINE HYDROCHLORIDE 30 MG: 30 CAPSULE, DELAYED RELEASE ORAL at 21:55

## 2024-06-19 RX ADMIN — METOPROLOL TARTRATE 12.5 MG: 25 TABLET, FILM COATED ORAL at 21:56

## 2024-06-19 RX ADMIN — SENNOSIDES AND DOCUSATE SODIUM 2 TABLET: 50; 8.6 TABLET ORAL at 08:48

## 2024-06-19 RX ADMIN — THIAMINE HYDROCHLORIDE 100 MG: 100 INJECTION, SOLUTION INTRAMUSCULAR; INTRAVENOUS at 08:48

## 2024-06-19 RX ADMIN — IPRATROPIUM BROMIDE AND ALBUTEROL SULFATE 3 ML: .5; 3 SOLUTION RESPIRATORY (INHALATION) at 11:12

## 2024-06-19 RX ADMIN — MIDODRINE HYDROCHLORIDE 10 MG: 5 TABLET ORAL at 11:30

## 2024-06-19 RX ADMIN — LATANOPROST 1 DROP: 50 SOLUTION OPHTHALMIC at 21:54

## 2024-06-19 RX ADMIN — POLYETHYLENE GLYCOL 3350 17 G: 17 POWDER, FOR SOLUTION ORAL at 17:01

## 2024-06-19 RX ADMIN — GUAIFENESIN 600 MG: 600 TABLET, EXTENDED RELEASE ORAL at 21:54

## 2024-06-19 RX ADMIN — IPRATROPIUM BROMIDE AND ALBUTEROL SULFATE 3 ML: .5; 3 SOLUTION RESPIRATORY (INHALATION) at 15:04

## 2024-06-19 RX ADMIN — IPRATROPIUM BROMIDE AND ALBUTEROL SULFATE 3 ML: .5; 3 SOLUTION RESPIRATORY (INHALATION) at 07:01

## 2024-06-19 RX ADMIN — Medication 10 ML: at 21:57

## 2024-06-19 RX ADMIN — Medication 1 APPLICATION: at 21:54

## 2024-06-19 RX ADMIN — OLANZAPINE 5 MG: 5 TABLET, FILM COATED ORAL at 21:56

## 2024-06-19 RX ADMIN — INSULIN LISPRO 2 UNITS: 100 INJECTION, SOLUTION INTRAVENOUS; SUBCUTANEOUS at 11:30

## 2024-06-19 RX ADMIN — SODIUM CHLORIDE 1000 MG: 0.9 INJECTION, SOLUTION INTRAVENOUS at 17:01

## 2024-06-19 RX ADMIN — Medication 4 ML: at 20:19

## 2024-06-19 RX ADMIN — BRIMONIDINE TARTRATE 1 DROP: 2 SOLUTION OPHTHALMIC at 08:49

## 2024-06-19 RX ADMIN — SODIUM BICARBONATE 650 MG: 650 TABLET, ORALLY DISINTEGRATING ORAL at 08:48

## 2024-06-19 RX ADMIN — SODIUM BICARBONATE 650 MG: 650 TABLET, ORALLY DISINTEGRATING ORAL at 21:55

## 2024-06-19 RX ADMIN — TERAZOSIN HYDROCHLORIDE 1 MG: 1 CAPSULE ORAL at 21:56

## 2024-06-19 NOTE — PLAN OF CARE
Goal Outcome Evaluation:              Outcome Evaluation: VSS, on heparin gtt, wife at bedside, denies any distress, diet advanced, worked with PT today, was about to shuffle with asst., will continue plan of care

## 2024-06-19 NOTE — PLAN OF CARE
Goal Outcome Evaluation:  Plan of Care Reviewed With: patient           Outcome Evaluation: Re-eval of swallow completed. Recommend regular textures and thin liquids. Meds whole with puree. Recommend upright, general aspiration precautions, intermittent supervision, and oral care s/p all PO. SLP to follow for diet tolerance.      Anticipated Discharge Disposition (SLP): unknown          SLP Swallowing Diagnosis: functional oral phase, functional pharyngeal phase (06/19/24 9036)

## 2024-06-19 NOTE — THERAPY TREATMENT NOTE
Patient Name: Dilip Verma  : 1950    MRN: [id number]                              Today's Date: 2024       Admit Date: 2024    Visit Dx:     ICD-10-CM ICD-9-CM   1. Acute respiratory failure with hypoxia  J96.01 518.81   2. Sepsis without acute organ dysfunction, due to unspecified organism  A41.9 038.9     995.91   3. Hyperkalemia  E87.5 276.7   4. Metabolic encephalopathy  G93.41 348.31   5. Acute UTI  N39.0 599.0   6. Hyperglycemia due to diabetes mellitus  E11.65 250.02   7. Chronic heart failure with preserved ejection fraction (HFpEF)  I50.32 428.9   8. Acute UTI (urinary tract infection)  N39.0 599.0     Patient Active Problem List   Diagnosis    Anxiety    Colon polyp    Type 2 diabetes mellitus with hyperglycemia, with long-term current use of insulin    Erectile dysfunction    Hyperlipidemia    Type 2 acute myocardial infarction    CAD (coronary artery disease)    Hx of CABG    Chronic diastolic heart failure    Hypersomnia due to medical condition    CSA (central sleep apnea)    Periodic breathing    HTN (hypertension)    History of stroke    CKD (chronic kidney disease) stage 3, GFR 30-59 ml/min    Urinary retention    Acute on chronic renal failure    Acute UTI (urinary tract infection)    Acute on chronic diastolic (congestive) heart failure    Bacteriuria    Rectal pain    Status post total replacement of hip    Vitamin D deficiency    Post-acute COVID-19 syndrome    Insulin dose changed    Arthropathy associated with neurological disorder    Personal history of colonic polyps    Type 2 diabetes mellitus with diabetic neuropathy, with long-term current use of insulin    Cervical spinal stenosis    Abscess of skin    Overweight    Cervical stenosis of spine    Spinal stenosis, lumbar region, without neurogenic claudication    Medically noncompliant    Chronic heart failure with preserved ejection fraction (HFpEF)    Carotid stenosis    Peripheral arterial disease    Sepsis     Hyperkalemia    Metabolic encephalopathy    AMENA (acute kidney injury)    Paroxysmal atrial fibrillation     Past Medical History:   Diagnosis Date    AMENA (acute kidney injury) 12/03/2020    Alcoholism 1989    not since 1998    CORI positive     Anemia     Anxiety     Ataxia     Atypical chest pain 04/19/2022    SEEN AT [facility] ER    Balance disorder     Carotid stenosis 3/28/2024    Cataract     BILATERAL, S/P EXTRACTION    Cervical radiculopathy 11/11/2019    SEEN AT  [facility] ER    Cervical spinal cord compression     Chronic diastolic (congestive) heart failure     Chronic kidney disease     STAGE 3, FOLLOWED BY DR. VIVAR    Chronic pancreatitis     Closed left subtrochanteric femur fracture 12/01/2020    ADMITTED TO [facility]    Closed nondisplaced intertrochanteric fracture of left femur 12/01/2020    ADMITTED TO [facility]    Colon polyps     FOLLOWED BY DR. AVTAR JEONG    Constipation     Contracture, right hand     Coronary artery disease     CABG 7/2019    COVID-19 07/2022    DDD (degenerative disc disease), cervical     Diabetes mellitus, type II     IDDM    Diabetic retinopathy     Difficulty walking     Dysphagia     Elevated brain natriuretic peptide (BNP) level 08/2014    Elevated LFTs 03/2021    Erectile dysfunction     Fissure, anal 2022    Foot drop     Fuchs' corneal dystrophy of right eye     Glaucoma     BILATERAL    History of alcohol abuse     Hyperlipidemia     Hypersomnia     Hypertension     Hypertensive urgency 02/25/2020    ADMITTED TO [facility]    Insomnia     Kidney stones     LV dysfunction 06/2016    Lyme disease     Lymphadenopathy syndrome 05/2021    Macular edema     BILATERAL    Myocardial infarction 11/05/2019    NSTEMI, ADMITTED TO [facility]    Myocardial infarction 07/12/2019    NSTEMI, ADMITTED TO [facility]    Neuropathy in diabetes     Non-celiac gluten sensitivity     Orthostasis     Osteoporosis     Oxygen dependent     PAD (peripheral artery disease)     Paroxysmal atrial fibrillation 6/6/2024    PNA (pneumonia)  "06/2016    LEFT LOBE    Polyneuropathy     PTSD (post-traumatic stress disorder)     Pulmonary hypertension     Pulmonary nodule     Senile ectropion of both lower eyelids 02/2022    Sepsis 12/16/2020    D/T UTI, ADMITTED TO [facility]    Sleep apnea     STATES DOESN'T USE BIPAP OR CPAP    Spinal stenosis in cervical region     SEVERE-LIMITED ROM    Stroke 2012    \"slight stroke\"    Syncope and collapse 07/06/2019    ADMITTED TO [place]    Urinary retention 04/05/2021    SEEN AT [facility] ER    Vision loss     Vitamin D deficiency      Past Surgical History:   Procedure Laterality Date    CARDIAC CATHETERIZATION N/A 07/15/2019    Procedure: Coronary angiography;  Surgeon: Carrie Price MD;  Location:  [facility];  Service: Cardiovascular    CARDIAC CATHETERIZATION N/A 07/15/2019    Procedure: Left Heart Cath;  Surgeon: Carrie Price MD;  Location:  [facility];  Service: Cardiovascular    CARDIAC CATHETERIZATION N/A 07/15/2019    Procedure: Left ventriculography;  Surgeon: Carrie Price MD;  Location:  [facility];  Service: Cardiovascular    CARDIAC CATHETERIZATION  07/15/2019    Procedure: Functional Flow [place];  Surgeon: Carrie Price MD;  Location:  [facility];  Service: Cardiovascular    CARDIAC CATHETERIZATION N/A 11/06/2019    Procedure: Right and Left Heart Cath;  Surgeon: Mya Smith MD;  Location:  [facility];  Service: Cardiovascular    CARDIAC CATHETERIZATION N/A 11/06/2019    Procedure: Coronary angiography;  Surgeon: Mya Smith MD;  Location:  [facility];  Service: Cardiovascular    CARDIAC SURGERY      CATARACT EXTRACTION Left 2014    CATARACT EXTRACTION Right 11/2016    PHACO/IOL, DR. LEN DENTON    COLONOSCOPY N/A 03/16/2023    ENTIRE COLON WNL, RESCOPE IN 5 YRS, DR. LINDA LIZARRAGA AT [facility]    COLONOSCOPY W/ POLYPECTOMY N/A 01/02/2015    A FEW DIVERTICULA IN SIGMOID, 6 MM TUBULOVILLOUS " ADENOMA POLYP IN RECTUM, SMALL HEMORRHOIDS, MELANOSIS COLI, DR. AVTAR JEONG AT [facility]    CORONARY ARTERY BYPASS GRAFT N/A 07/18/2019    Procedure: INTRAOPERATIVE SHOAIB; STERNOTOMY CORONARY ARTERY BYPASS x 3  USING LEFT INTERNAL MAMMARY ARTERY GRAFT UTILIZING ENDOSCOPICALLY HARVESTED RIGHT GREATER SAPHENOUS VEIN AND PRP.;  Surgeon: Bill Devi MD;  Location: [facility] OR;  Service: Cardiothoracic    CYSTOSCOPY BLADDER STONE LITHOTRIPSY N/A     DENTAL PROCEDURE Bilateral     3 surgeries inder implants    FEMUR IM NAILING/RODDING Left 12/03/2020    Procedure: LEFT HIP INTRAMEDULLARY NAIL;  Surgeon: Niraj Ravi MD;  Location: [facility] OR;  Service: Orthopedic Spine;  Laterality: Left;    INCISION AND DRAINAGE PERIRECTAL ABSCESS N/A 10/04/2023    Procedure: Incision and drainage of perianal and buttock abscess;  Surgeon: Herbie Villatoro MD;  Location: [facility] OR;  Service: General;  Laterality: N/A;    INGUINAL HERNIA REPAIR Bilateral     TOENAIL EXCISION  06/2022    TONSILLECTOMY Bilateral     TOTAL HIP ARTHROPLASTY REVISION Left 01/11/2022    Procedure: TOTAL HIP ARTHROPLASTY REVISION- POSTERIOR;  Surgeon: Jurgen Candelaria II, MD;  Location: [facility] OR;  Service: Orthopedics;  Laterality: Left;    VASECTOMY N/A       General Information       Row Name 06/19/24 1612          Physical Therapy Time and Intention    Document Type therapy note (daily note)  -EB     Mode of Treatment individual therapy;physical therapy  -EB       Row Name 06/19/24 1612          General Information    Patient Profile Reviewed yes  -EB     Existing Precautions/Restrictions fall;oxygen therapy device and L/min  -EB       Row Name 06/19/24 1612          Cognition    Orientation Status (Cognition) oriented to;person;place  -EB       Row Name 06/19/24 1612          Safety Issues, Functional Mobility    Impairments Affecting Function (Mobility) balance;cognition;endurance/activity  tolerance;strength;motor control  -EB               User Key  (r) = Recorded By, (t) = Taken By, (c) = Cosigned By      Initials Name Provider Type    Gina Nolasco PTA Physical Therapist Assistant                   Mobility       Row Name 06/19/24 1613          Bed Mobility    Supine-Sit [place] (Bed Mobility) minimum assist (75% patient effort);verbal cues;2 person assist  -EB     Sit-Supine [place] (Bed Mobility) minimum assist (75% patient effort);verbal cues  -EB     Assistive Device (Bed Mobility) bed rails;head of bed elevated  -EB     Comment, (Bed Mobility) sequencing cues needed  -EB       Row Name 06/19/24 1613          Sit-Stand Transfer    Sit-Stand [place] (Transfers) moderate assist (50% patient effort);2 person assist  -EB     Assistive Device (Sit-Stand Transfers) walker, front-wheeled  -EB     Comment, (Sit-Stand Transfer) 3X from EOB  -EB       Row Name 06/19/24 1613          Gait/Stairs (Locomotion)    [place] Level (Gait) minimum assist (75% patient effort);2 person assist  -EB     Assistive Device (Gait) walker, front-wheeled  -EB     Distance in Feet (Gait) 12  3X4ft  -EB     Deviations/Abnormal Patterns (Gait) gait speed decreased;farhana decreased;stride length decreased;weight shifting decreased  -EB     Bilateral Gait Deviations forward flexed posture  -EB     Comment, (Gait/Stairs) lateral side steps along EOB. completed 3X4ft with pt taking seated rest breaks in between.  -EB               User Key  (r) = Recorded By, (t) = Taken By, (c) = Cosigned By      Initials Name Provider Type    Gina Nolasco PTA Physical Therapist Assistant                   Obj/Interventions       Row Name 06/19/24 1616          Balance    Balance Assessment sitting static balance;standing static balance  -EB     Static Sitting Balance standby assist  -EB     Position, Sitting Balance sitting edge of bed  -EB     Static Standing Balance moderate assist;minimal assist;2-person assist   -EB               User Key  (r) = Recorded By, (t) = Taken By, (c) = Cosigned By      Initials Name Provider Type    Gina Nolasco PTA Physical Therapist Assistant                   Goals/Plan    No documentation.                  Clinical Impression       Row Name 06/19/24 1620          Plan of Care Review    Plan of Care Reviewed With patient  -EB     Progress improving  -EB     Outcome Evaluation Pt seen for PT ttx this PM. Pt sleeping but easily awakens and agreeable to participate. Pt completed bed mobility with MinAX2/Anders and only needed SBA with sitting balance. Pt completed 3 stands from EOB with ModAX2 and took lateral side steps along EOB with MinAX2. Pt able to ambulate laterally 3X4ft each time with seated rest break in between. Pt is continue to progress with mobility  but will still need SNF at d/c. Will continue to follow pt.  -EB       Row Name 06/19/24 1620          Therapy Assessment/Plan (PT)    Therapy Frequency (PT) 3 times/wk  -EB       Row Name 06/19/24 1620          Positioning and Restraints    Pre-Treatment Position in bed  -EB     Post Treatment Position bed  -EB     In Bed supine;call light within reach;encouraged to call for assist;exit alarm on  -EB               User Key  (r) = Recorded By, (t) = Taken By, (c) = Cosigned By      Initials Name Provider Type    Gina Nolasco PTA Physical Therapist Assistant                   Outcome Measures       Row Name 06/19/24 9134 06/19/24 0800       How much help from another person do you currently need...    Turning from your back to your side while in flat bed without using bedrails? 3  -EB 2  -JW    Moving from lying on back to sitting on the side of a flat bed without bedrails? 3  -EB 2  -JW    Moving to and from a bed to a chair (including a wheelchair)? 2  -EB 2  -JW    Standing up from a chair using your arms (e.g., wheelchair, bedside chair)? 2  -EB 2  -JW    Climbing 3-5 steps with a railing? 1  -EB 2  -JW    To walk in hospital  room? 2  -EB 2  -JW    AM-PAC 6 Clicks Score (PT) 13  - 12  -    Highest Level of Mobility Goal 4 --> Transfer to chair/commode  - 4 --> Transfer to chair/commode  -      Row Name 06/19/24 1624          Modified [place] Scale    Modified Jeremias Scale 4 - Moderately severe disability.  Unable to walk without assistance, and unable to attend to own bodily needs without assistance.  -EB               User Key  (r) = Recorded By, (t) = Taken By, (c) = Cosigned By      Initials Name Provider Type    Gina Nolasco PTA Physical Therapist Assistant    Vannesa Zepeda, RN Registered Nurse                                 Physical Therapy Education       Title: PT OT SLP Therapies (In Progress)       Topic: Physical Therapy (In Progress)       Point: Mobility training (In Progress)       Learning Progress Summary             Patient Acceptance, E,D, VU,NR by EB at 6/19/2024 1624    Acceptance, E,D, NR,NL by EB at 6/17/2024 1627    Acceptance, E,TB, NR by ST at 6/14/2024 1538    Acceptance, E,D, NL by EB at 6/12/2024 1321    Acceptance, E, NR by EM at 6/7/2024 1511    Acceptance, E,D, DU by PC at 6/5/2024 1518    Acceptance, E,D, DU,NR by PC at 6/4/2024 1557    Acceptance, E,D, DU by PC at 6/2/2024 1427    Acceptance, E,TB,D, VU by CB at 6/1/2024 2218    Acceptance, E,D, DU by PC at 5/31/2024 1612   Family Acceptance, E,TB,D, VU by CB at 6/1/2024 2218   Significant Other Acceptance, E, NR by EM at 6/7/2024 1511                         Point: Home exercise program (In Progress)       Learning Progress Summary             Patient Acceptance, E,D, NR,NL by EB at 6/17/2024 1627    Nonacceptance, E, NL,NR by DJ at 6/10/2024 1541    Acceptance, E,D, DU by PC at 6/5/2024 1518    Acceptance, E,D, DU,NR by PC at 6/4/2024 1557    Acceptance, E,D, DU by PC at 6/2/2024 1427    Acceptance, E,TB,D, VU by CB at 6/1/2024 2218    Acceptance, E,D, DU by CECE at 5/31/2024 1612   Family Nonacceptance, E, NL,NR by BLAZE at 6/10/2024 1541     Acceptance, E,TB,D, VU by CB at 6/1/2024 2218                         Point: Body mechanics (In Progress)       Learning Progress Summary             Patient Acceptance, E,D, VU,NR by EB at 6/19/2024 1624    Acceptance, E,D, NR,NL by EB at 6/17/2024 1627    Acceptance, E,TB, NR by ST at 6/14/2024 1538    Acceptance, E,D, NL by EB at 6/12/2024 1321    Nonacceptance, E, NL,NR by DJ at 6/10/2024 1541    Acceptance, E,D, DU by PC at 6/5/2024 1518    Acceptance, E,D, DU,NR by PC at 6/4/2024 1557    Acceptance, E,D, DU by PC at 6/2/2024 1427    Acceptance, E,TB,D, VU by CB at 6/1/2024 2218    Acceptance, E,D, DU by PC at 5/31/2024 1612   Family Nonacceptance, E, NL,NR by DJ at 6/10/2024 1541    Acceptance, E,TB,D, VU by CB at 6/1/2024 2218                         Point: Precautions (In Progress)       Learning Progress Summary             Patient Acceptance, E,D, VU,NR by EB at 6/19/2024 1624    Nonacceptance, E, NL,NR by DJ at 6/10/2024 1541    Acceptance, E,D, DU by PC at 6/5/2024 1518    Acceptance, E,D, DU,NR by PC at 6/4/2024 1557    Acceptance, E,D, DU by PC at 6/2/2024 1427    Acceptance, E,TB,D, VU by CB at 6/1/2024 2218    Acceptance, E,D, DU by PC at 5/31/2024 1612   Family Nonacceptance, E, NL,NR by DJ at 6/10/2024 1541    Acceptance, E,TB,D, VU by CB at 6/1/2024 2218                                         User Key       Initials Effective Dates Name Provider Type Discipline    PC 06/16/21 -  Gemma Hamilton, PT Physical Therapist PT    EM 06/16/21 -  Nila Toro, PT Physical Therapist PT    EB 02/14/23 -  Gina Fortune PTA Physical Therapist Assistant PT    DJ 10/25/19 -  Jacqueline Lizarraga, SHEFALI Physical Therapist PT    CB 02/21/24 -  Murphy Herndon, RN Registered Nurse Nurse     09/22/22 -  Sahara Harrison, SHEFALI Physical Therapist PT                  PT Recommendation and Plan     Plan of Care Reviewed With: patient  Progress: improving  Outcome Evaluation: Pt seen for PT ttx this PM. Pt sleeping but  easily awakens and agreeable to participate. Pt completed bed mobility with MinAX2/Anders and only needed SBA with sitting balance. Pt completed 3 stands from EOB with ModAX2 and took lateral side steps along EOB with MinAX2. Pt able to ambulate laterally 3X4ft each time with seated rest break in between. Pt is continue to progress with mobility  but will still need SNF at d/c. Will continue to follow pt.     Time Calculation:         PT Charges       Row Name 06/19/24 1625             Time Calculation    Start Time 1306  -EB      Stop Time 1335  -EB      Time Calculation (min) 29 min  -EB      PT Received On 06/19/24  -EB      PT - Next Appointment 06/20/24  -EB         Time Calculation- PT    Total Timed Code Minutes- PT 29 minute(s)  -EB                User Key  (r) = Recorded By, (t) = Taken By, (c) = Cosigned By      Initials Name Provider Type    Gina Nolasco PTA Physical Therapist Assistant                  Therapy Charges for Today       Code Description Service Date Service Provider Modifiers Qty    00308438521 HC GAIT TRAINING EA 15 MIN 6/19/2024 Gina Fortune PTA GP 1    24773295218 HC PT THERAPEUTIC ACT EA 15 MIN 6/19/2024 Gina Fortune PTA GP 1    61758848798 HC PT THER SUPP EA 15 MIN 6/19/2024 Gina Fortune PTA GP 2            PT G-Codes  Outcome Measure Options: AM-PAC 6 Clicks Daily Activity (OT)  AM-PAC 6 Clicks Score (PT): 13  AM-PAC 6 Clicks Score (OT): 10  Modified Jeremias Scale: 4 - Moderately severe disability.  Unable to walk without assistance, and unable to attend to own bodily needs without assistance.       Gina Fortune PTA  6/19/2024

## 2024-06-19 NOTE — PLAN OF CARE
Goal Outcome Evaluation:              Outcome Evaluation: pt aox4, forgewtful and illogical thinking, heparin gtt continues

## 2024-06-19 NOTE — PROGRESS NOTES
"  Infectious Diseases Progress Note    Tino Nagel MD     [facility]  [place]: 20 days  Patient Identification:  Name: Dilip Verma  Age: 74 y.o.  Sex: male  :  1950  MRN: [id number]         Primary Care Physician: Fernando Camargo, DO        Subjective: Continues to improve.  Interval History: See consultation note.  2024 MRSA screen is negative.  Objective:    Scheduled Meds:amiodarone, 200 mg, Oral, Q24H  [Held by provider] apixaban, 5 mg, Oral, Q12H  aspirin, 81 mg, Oral, Daily  brimonidine, 1 drop, Both Eyes, BID  DULoxetine, 30 mg, Oral, Daily  guaiFENesin, 600 mg, Oral, Q12H  insulin lispro, 2-9 Units, Subcutaneous, 4x Daily AC & at Bedtime  ipratropium-albuterol, 3 mL, Nebulization, 4x Daily - RT  latanoprost, 1 drop, Both Eyes, Nightly  Menthol-Zinc Oxide, 1 Application, Topical, Q12H  metoprolol tartrate, 12.5 mg, Oral, Q12H  midodrine, 10 mg, Oral, TID AC  OLANZapine, 5 mg, Oral, Nightly  senna-docusate sodium, 2 tablet, Oral, BID  sodium bicarbonate, 650 mg, Oral, TID  sodium chloride, 10 mL, Intravenous, Q12H  sodium chloride, 10 mL, Intravenous, Q12H  sodium chloride, 4 mL, Nebulization, BID - RT  terazosin, 1 mg, Oral, Nightly  thiamine, 100 mg, Intravenous, Daily  vancomycin, 1,000 mg, Intravenous, Once      Continuous Infusions:heparin, 10.4 Units/kg/hr, Last Rate: 16.4 Units/kg/hr (24 1702)  Pharmacy to dose vancomycin,             Vital signs in last 24 hours:  Temp:  [97.9 °F (36.6 °C)-98.5 °F (36.9 °C)] 98.5 °F (36.9 °C)  Heart Rate:  [59-72] 72  Resp:  [18] 18  BP: (136-174)/(63-79) 158/70    Intake/Output:    Intake/Output Summary (Last 24 hours) at 2024 0910  Last data filed at 2024 0900  Gross per 24 hour   Intake 965 ml   Output 1475 ml   Net -510 ml           Exam:  /70 (BP Location: Right arm, Patient Position: Lying)   Pulse 72   Temp 98.5 °F (36.9 °C) (Oral)   Resp 18   Ht 172.7 cm (68\")   Wt 95.9 kg (211 lb 6.7 oz)   SpO2 90%   BMI " 32.15 kg/m²   Patient is examined using the personal protective equipment as per guidelines from infection control for this particular patient as enacted.  Hand washing was performed before and after patient interaction.  General Appearance: Confused interactive and follows command.                          Head:    Normocephalic, without obvious abnormality, atraumatic                           Eyes:    PERRL, conjunctivae/corneas clear, EOM's intact, both eyes                         Throat:   Lips, tongue, gums normal; oral mucosa pink and moist                           Neck:   Supple, symmetrical, trachea midline, no JVD                         Lungs:    Clear to auscultation bilaterally, respirations unlabored                 Chest Wall:    No tenderness or deformity                          Heart:  S1-S2 regular                  Abdomen:   Soft nontender                 Extremities: Improvement in swelling and redness of the left arm.  Left lower extremity edema and swelling is much improved.                    Neurologic: No acute distress       Data Review:    I reviewed the patient's new clinical results.  Results from last 7 days   Lab Units 06/18/24  0639 06/17/24  0600 06/16/24  0546 06/15/24  0703 06/14/24  0655 06/13/24  0532   WBC 10*3/mm3 6.79 7.06 7.97 7.52 8.80 9.76   HEMOGLOBIN g/dL 10.5* 10.1* 10.4* 10.7* 10.9* 11.1*   PLATELETS 10*3/mm3 256 250 263 268 243 209     Results from last 7 days   Lab Units 06/19/24  0753 06/18/24  0639 06/17/24  0559 06/16/24  0546 06/15/24  0703 06/14/24  0655 06/13/24  0532   SODIUM mmol/L 139 138 137 136 136 132* 131*   POTASSIUM mmol/L 4.2 4.4 4.5 4.4 4.1 4.2 3.9   CHLORIDE mmol/L 105 105 105 101 100 98 97*   CO2 mmol/L 24.0 20.6* 22.0 20.5* 20.9* 20.2* 21.5*   BUN mg/dL 35* 27* 53* 44* 66* 54* 49*   CREATININE mg/dL 3.64* 3.46* 5.23* 4.81* 6.93* 5.83* 5.26*   CALCIUM mg/dL 8.3* 8.2* 8.0* 8.1* 8.2* 8.5* 8.3*   GLUCOSE mg/dL 135* 111* 141* 159* 143* 168* 149*      Microbiology Results (last 10 days)       ** No results found for the last 240 hours. **            Assessment:    Acute UTI (urinary tract infection)    Type 2 diabetes mellitus with hyperglycemia, with long-term current use of insulin    CAD (coronary artery disease)    Hx of CABG    Chronic diastolic heart failure    CSA (central sleep apnea)    HTN (hypertension)    History of stroke    CKD (chronic kidney disease) stage 3, GFR 30-59 ml/min    Peripheral arterial disease    Sepsis    Hyperkalemia    Metabolic encephalopathy    AMENA (acute kidney injury)    Paroxysmal atrial fibrillation  1-metabolic encephalopathy due to  2-systemic infection as a result of urinary tract infection as well as evolving sepsis traumatic cellulitis of the left leg.  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     Recommendations/Discussions:  Despite the fact that he is negative MRSA screen his right arm-continue with IV vancomycin for phlebitis and secondary cellulitis of the left arm as it is showing improvement.  To complete his vancomycin on 6/19/2024 and afterwards need to be monitored off of his antibiotic therapy  Continue with left arm elevation  Continue supportive care per primary team.  Tino Nagel MD  6/19/2024  09:10 EDT    Parts of this note may be an electronic transcription/translation of spoken language to printed text using the Dragon dictation system.

## 2024-06-19 NOTE — PROGRESS NOTES
"Nutrition Services    Patient Name:  Dilip Verma  YOB: 1950  MRN: [id number]  Admit Date:  5/30/2024Assessment Date:  06/19/24    Summary: Follow up    AMS improving but pt still confused per chart review. S/p dialysis yesterday. Pt in bed. No family in the room during this RD visit. Reports good appetite. Per chart review pt's PO intake varies from 0-100%. Pt reports his wife making him drink the boost to help w/ PO intake but he dislikes them. Agreeable to trying magic cups instead. PO intake encouraged.     Labs: BUN 35, Cr 3.64, GFR 16.7, glu 188/135/163  Meds: insulin, pericolace, proatamine, sodium bicarb    NUTRITION SCREENING      Reason for Encounter LOS   Diagnosis/Problem Encephalopathy, acute UTI    PMH: DM, CKD3, ARF with hypoxia AMENA, CAD, HTN   PO Diet Diet: Regular/House, Diabetic, Cardiac; Healthy Heart (2-3 Na+); Consistent Carbohydrate; Texture: Regular (IDDSI 7); Fluid Consistency: Thin (IDDSI 0)   Supplements    PO Intake % %       Medications MAR reviewed by RD   Labs  Listed below, reviewed   Physical Findings Alert, confused sometimes, 2+  edema, teeth missing   GI Function Passing flatus, LBM 6/17, fecal incontrinence   Skin Status Bruised, right LL abrasion        Height  Weight  BMI  Weight Trend     Height: 172.7 cm (68\")  Weight: 95.9 kg (211 lb 6.7 oz) (06/13/24 2126)  Body mass index is 32.15 kg/m².  Gain       Nutrition Problem (PES) Inadequate oral intake related to encephalopathy/confusion as evidenced by decreased oral intake observed and evidence of pocketing/spitting out foods.       Intervention/Plan Discontinue boost glucose control.   Will order magic cups BID.     RD to follow up per protocol.     Results from last 7 days   Lab Units 06/19/24  0753 06/18/24  0639 06/17/24  0559   SODIUM mmol/L 139 138 137   POTASSIUM mmol/L 4.2 4.4 4.5   CHLORIDE mmol/L 105 105 105   CO2 mmol/L 24.0 20.6* 22.0   BUN mg/dL 35* 27* 53*   CREATININE mg/dL 3.64* 3.46* " 5.23*   CALCIUM mg/dL 8.3* 8.2* 8.0*   GLUCOSE mg/dL 135* 111* 141*     Results from last 7 days   Lab Units 06/18/24  0639   HEMOGLOBIN g/dL 10.5*   HEMATOCRIT % 31.9*     Lab Results   Component Value Date    HGBA1C 8.90 (H) 10/03/2023         Electronically signed by:  Lucy Parry RD  06/19/24 14:56 EDT

## 2024-06-19 NOTE — PROGRESS NOTES
Infectious Diseases Progress Note    Tino Nagel MD     [facility]  Los: 19 days  Patient Identification:  Name: Dilip Verma  Age: 74 y.o.  Sex: male  :  1950  MRN: [id number]         Primary Care Physician: Fernando Camargo, DO        Subjective: Much more clear mentally.  Decreased pain discomfort and redness of the left arm.  Decreased swelling and redness of the left leg.  Interval History: See consultation note.  2024 MRSA screen is negative.  Objective:    Scheduled Meds:amiodarone, 200 mg, Oral, Q24H  [Held by provider] apixaban, 5 mg, Oral, Q12H  aspirin, 81 mg, Oral, Daily  brimonidine, 1 drop, Both Eyes, BID  DULoxetine, 30 mg, Oral, Daily  guaiFENesin, 600 mg, Oral, Q12H  insulin lispro, 2-9 Units, Subcutaneous, 4x Daily AC & at Bedtime  ipratropium-albuterol, 3 mL, Nebulization, 4x Daily - RT  latanoprost, 1 drop, Both Eyes, Nightly  Menthol-Zinc Oxide, 1 Application, Topical, Q12H  metoprolol tartrate, 12.5 mg, Oral, Q12H  midodrine, 10 mg, Oral, TID AC  OLANZapine, 5 mg, Oral, Nightly  senna-docusate sodium, 2 tablet, Oral, BID  sodium bicarbonate, 650 mg, Oral, TID  sodium chloride, 10 mL, Intravenous, Q12H  sodium chloride, 10 mL, Intravenous, Q12H  sodium chloride, 4 mL, Nebulization, BID - RT  terazosin, 1 mg, Oral, Nightly  thiamine, 100 mg, Intravenous, Daily  Vancomycin Pharmacy Intermittent/Pulse Dosing, , Does not apply, Daily      Continuous Infusions:heparin, 10.4 Units/kg/hr, Last Rate: 16.4 Units/kg/hr (24 1702)  Pharmacy to dose vancomycin,             Vital signs in last 24 hours:  Temp:  [97.7 °F (36.5 °C)-98.6 °F (37 °C)] 97.9 °F (36.6 °C)  Heart Rate:  [59-75] 66  Resp:  [18] 18  BP: (141-174)/(59-77) 156/66    Intake/Output:    Intake/Output Summary (Last 24 hours) at 2024  Last data filed at 2024 1728  Gross per 24 hour   Intake 600 ml   Output 950 ml   Net -350 ml           Exam:  /66 (BP Location: Right arm, Patient  "Position: Lying)   Pulse 66   Temp 97.9 °F (36.6 °C) (Oral)   Resp 18   Ht 172.7 cm (68\")   Wt 95.9 kg (211 lb 6.7 oz)   SpO2 92%   BMI 32.15 kg/m²   Patient is examined using the personal protective equipment as per guidelines from infection control for this particular patient as enacted.  Hand washing was performed before and after patient interaction.  General Appearance: Confused interactive and follows command.                          Head:    Normocephalic, without obvious abnormality, atraumatic                           Eyes:    PERRL, conjunctivae/corneas clear, EOM's intact, both eyes                         Throat:   Lips, tongue, gums normal; oral mucosa pink and moist                           Neck:   Supple, symmetrical, trachea midline, no JVD                         Lungs:    Clear to auscultation bilaterally, respirations unlabored                 Chest Wall:    No tenderness or deformity                          Heart:  S1-S2 regular                  Abdomen:   Soft nontender                 Extremities: Improvement in swelling and redness of the left arm.  Left lower extremity edema and swelling is much improved.                    Neurologic: No acute distress       Data Review:    I reviewed the patient's new clinical results.  Results from last 7 days   Lab Units 06/18/24  0639 06/17/24  0600 06/16/24  0546 06/15/24  0703 06/14/24  0655 06/13/24  0532 06/12/24  0639   WBC 10*3/mm3 6.79 7.06 7.97 7.52 8.80 9.76 13.69*   HEMOGLOBIN g/dL 10.5* 10.1* 10.4* 10.7* 10.9* 11.1* 12.1*   PLATELETS 10*3/mm3 256 250 263 268 243 209 229     Results from last 7 days   Lab Units 06/18/24  0639 06/17/24  0559 06/16/24  0546 06/15/24  0703 06/14/24  0655 06/13/24  0532 06/12/24  0639   SODIUM mmol/L 138 137 136 136 132* 131* 132*   POTASSIUM mmol/L 4.4 4.5 4.4 4.1 4.2 3.9 4.0   CHLORIDE mmol/L 105 105 101 100 98 97* 97*   CO2 mmol/L 20.6* 22.0 20.5* 20.9* 20.2* 21.5* 22.9   BUN mg/dL 27* 53* 44* 66* 54* " 49* 38*   CREATININE mg/dL 3.46* 5.23* 4.81* 6.93* 5.83* 5.26* 4.55*   CALCIUM mg/dL 8.2* 8.0* 8.1* 8.2* 8.5* 8.3* 8.2*   GLUCOSE mg/dL 111* 141* 159* 143* 168* 149* 168*     Microbiology Results (last 10 days)       ** No results found for the last 240 hours. **            Assessment:    Acute UTI (urinary tract infection)    Type 2 diabetes mellitus with hyperglycemia, with long-term current use of insulin    CAD (coronary artery disease)    Hx of CABG    Chronic diastolic heart failure    CSA (central sleep apnea)    HTN (hypertension)    History of stroke    CKD (chronic kidney disease) stage 3, GFR 30-59 ml/min    Peripheral arterial disease    Sepsis    Hyperkalemia    Metabolic encephalopathy    AMENA (acute kidney injury)    Paroxysmal atrial fibrillation  1-metabolic encephalopathy due to  2-systemic infection as a result of urinary tract infection as well as evolving sepsis traumatic cellulitis of the left leg.  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     Recommendations/Discussions:  Despite the fact that he is negative MRSA screen his right arm-continue with IV vancomycin for phlebitis and secondary cellulitis of the left arm as it is showing improvement.  Continue antibiotic treatment for 7 days with continued local care elevation and monitoring and local care of the scabbed wound on the medial aspect of the midforearm-last days to schedule to complete at 9 AM on 6/19/2024.  Continue with left arm elevation  Continue supportive care per primary team.  Tino Nagel MD  6/18/2024  20:57 EDT    Parts of  this note may be an electronic transcription/translation of spoken language to printed text using the Dragon dictation system.

## 2024-06-19 NOTE — PROGRESS NOTES
"[facility] Clinical Pharmacy Services: Vancomycin Monitoring Note    Dilip Verma is a 74 y.o. male who is on day 7/7 of pharmacy to dose vancomycin for phlebitis and secondary cellulitis of the left arm.    Previous Vancomycin Dose: 1000 mg IV once on 6/17 at 1648    Updated Cultures and Sensitivities:   5/30: BCx-negative  5/30: UCx-50K Serratia marcescens  6/4: MRSA screen-negative    Results from last 7 days   Lab Units 06/19/24  0753 06/18/24  0639 06/17/24  0559   VANCOMYCIN RM mcg/mL 15.20 20.60 13.20     Vitals/Labs  Ht: 172.7 cm (68\"); Wt: 95.9 kg (211 lb 6.7 oz)   Temp Readings from Last 1 Encounters:   06/19/24 98.5 °F (36.9 °C) (Oral)     Estimated Creatinine Clearance: 19.7 mL/min (A) (by C-G formula based on SCr of 3.64 mg/dL (H)).     Results from last 7 days   Lab Units 06/19/24  0753 06/18/24  0639 06/17/24  0600 06/17/24  0559 06/16/24  0546   CREATININE mg/dL 3.64* 3.46*  --  5.23* 4.81*   WBC 10*3/mm3  --  6.79 7.06  --  7.97     Assessment/Plan    Level was 15.2 mcg/mL today, which is within the goal pre-IHD level range of 15-20 mcg/mL. Will re-dose vancomycin today. Ordered 1000 mg IV once to be given after dialysis.  Next Level Date and Time: No need for any additional levels as this is the last day of treatment.  We will continue to monitor patient changes and dialysis schedule until discontinuation of vancomycin or discharge.     Thank you for involving pharmacy in this patient's care. Please contact pharmacy with any questions or concerns.       Daisy Slater, Pharm.D., [facility]   Clinical Pharmacist  "

## 2024-06-19 NOTE — PLAN OF CARE
Goal Outcome Evaluation:  Plan of Care Reviewed With: patient        Progress: improving  Outcome Evaluation: Pt seen for PT ttx this PM. Pt sleeping but easily awakens and agreeable to participate. Pt completed bed mobility with MinAX2/Anders and only needed SBA with sitting balance. Pt completed 3 stands from EOB with ModAX2 and took lateral side steps along EOB with MinAX2. Pt able to ambulate laterally 3X4ft each time with seated rest break in between. Pt is continue to progress with mobility  but will still need SNF at d/c. Will continue to follow pt.

## 2024-06-19 NOTE — PROGRESS NOTES
PROGRESS NOTE      Patient Name: Dilip Verma  : 1950  MRN: [id number]  Primary Care Physician: Fernando Camargo DO  Date of admission: 2024    Patient Care Team:  Fernando Camargo DO as PCP - General (Family Medicine)  Morris Cai MD as Consulting Physician (Sleep Medicine)  Michaela Hylton MD as Consulting Physician (Cardiology)  Hardy Banerjee MD as Consulting Physician (Ophthalmology)  Chula Christianson MD as Consulting Physician (Ophthalmology)  Jason Sherman MD (Endocrinology)  Perla Mayen MD as Consulting Physician (Nephrology)  Federico Hebert MD as Consulting Physician (Pulmonary Disease)  Niraj Ravi MD as Consulting Physician (Orthopedic Surgery)  Papa Velasco MD as Consulting Physician (Urology)  Terese Yost MD as Consulting Physician (Gastroenterology)  Aracelis Ball MD as Consulting Physician (Colon and Rectal Surgery)  [facility] as Primary Care Provider        Reason for Follow up:     AMENA, CKD III      Subjective:     Patient seen and examined, more awake, still  confused  S/p dialysis yesterday    Review of Systems:         Constitutional: weakness.  HEENT:  No headache, otalgia, itchy eyes, nasal discharge or sore throat.  Cardiac:  No chest pain, dyspnea, orthopnea or PND.  Chest:  No cough, phlegm or wheezing.  Abdomen:  No abdominal pain, nausea or vomiting.  Neuro:  No focal weakness, abnormal movements or seizure-like activity.  :   No hematuria, no pyuria, no dysuria, no flank pain.  Extremities:  No  joint pains.  ROS was otherwise negative except as mentioned in the [place].    Social History:  reports that he quit smoking about 24 years ago. His smoking use included cigarettes. He started smoking about 40 years ago. He has a 12 pack-year smoking history. He has been exposed to tobacco smoke. He has never used smokeless tobacco. He reports that he does not currently use alcohol. He reports that he does not  use drugs.    Medications:  Prior to Admission medications    Medication Sig Start Date End Date Taking? Authorizing Provider   aspirin 81 MG EC tablet Take 1 tablet by mouth Every Night.   Yes Heri Rinaldi MD   brimonidine (ALPHAGAN) 0.2 % ophthalmic solution Administer 1 drop to both eyes 2 (Two) Times a Day.   Yes Heri Rinaldi MD   bumetanide (BUMEX) 1 MG tablet Take 1 tablet by mouth Daily.   Yes Heri Rinaldi MD   Cholecalciferol (Vitamin D-3) 125 MCG (5000 UT) tablet Take 1 tablet by mouth Daily.   Yes Heri Rinaldi MD   DULoxetine (CYMBALTA) 30 MG capsule Take 1 capsule by mouth Every Night. 1/16/24  Yes Heri Rinaldi MD   Insulin Glargine, 2 Unit Dial, (TOUJEO) 300 UNIT/ML solution pen-injector injection Inject 24 Units under the skin into the appropriate area as directed Daily.   Yes Heri Rinaldi MD   insulin lispro (humaLOG) 100 UNIT/ML injection Inject 0-14 Units under the skin into the appropriate area as directed 3 (Three) Times a Day Before Meals.  Patient taking differently: Inject 0-14 Units under the skin into the appropriate area as directed 3 (Three) Times a Day Before Meals. SLIDING SCALE  100-150 - 4 units  151-200 - 5 units  201-250 - 6 units  251-300 - 7 units  301-350 - 8 units  351-400 - 9 units  401+ - 10 units 12/7/20  Yes Amador Valverde MD   latanoprost (XALATAN) 0.005 % ophthalmic solution Administer 1 drop to both eyes every night at bedtime. 1/16/23  Yes Heri Rinaldi MD   multivitamin with minerals (MULTIVITAMIN ADULTS PO) Take 1 tablet by mouth Daily.   Yes Heri Rinaldi MD   testosterone (ANDROGEL) 25 MG/2.5GM (1%) gel gel Place 25 mg on the skin as directed by provider 2 (Two) Times a Week.   Yes Heri Rinaldi MD   traMADol (ULTRAM) 50 MG tablet Take 1 tablet by mouth At Night As Needed. 10/16/23  Yes Heri Rinaldi MD   Magnesium 400 MG capsule Take 400 mg by mouth As Needed.    [place]  MD Heri   sildenafil (REVATIO) 20 MG tablet Take 1 tablet by mouth As Needed.    Provider, MD Heri     Scheduled Meds:amiodarone, 200 mg, Oral, Q24H  [Held by provider] apixaban, 5 mg, Oral, Q12H  aspirin, 81 mg, Oral, Daily  brimonidine, 1 drop, Both Eyes, BID  DULoxetine, 30 mg, Oral, Daily  guaiFENesin, 600 mg, Oral, Q12H  insulin lispro, 2-9 Units, Subcutaneous, 4x Daily AC & at Bedtime  ipratropium-albuterol, 3 mL, Nebulization, 4x Daily - RT  latanoprost, 1 drop, Both Eyes, Nightly  Menthol-Zinc Oxide, 1 Application, Topical, Q12H  metoprolol tartrate, 12.5 mg, Oral, Q12H  midodrine, 10 mg, Oral, TID AC  OLANZapine, 5 mg, Oral, Nightly  senna-docusate sodium, 2 tablet, Oral, BID  sodium bicarbonate, 650 mg, Oral, TID  sodium chloride, 10 mL, Intravenous, Q12H  sodium chloride, 10 mL, Intravenous, Q12H  sodium chloride, 4 mL, Nebulization, BID - RT  terazosin, 1 mg, Oral, Nightly  thiamine, 100 mg, Intravenous, Daily  vancomycin, 1,000 mg, Intravenous, Once      Continuous Infusions:heparin, 10.4 Units/kg/hr, Last Rate: 16.371 Units/kg/hr (06/19/24 1018)  Pharmacy to dose vancomycin,           PRN Meds:  acetaminophen **OR** acetaminophen **OR** acetaminophen    senna-docusate sodium **AND** polyethylene glycol **AND** bisacodyl **AND** bisacodyl    dextrose    dextrose    glucagon (human recombinant)    heparin (porcine)    nitroglycerin    OLANZapine    ondansetron    Pharmacy to dose vancomycin    sodium chloride    sodium chloride    sodium chloride    sodium chloride    traMADol  Allergies:    Allergies   Allergen Reactions    Ace Inhibitors Angioedema    Angiotensin Receptor Blockers Angioedema and Unknown (See Comments)     Angioneurotic edema    Dapagliflozin Other (See Comments)     UTI,  rectal abcess    Losartan Angioedema     Angioneurotic edema    Seroquel [Quetiapine] Hallucinations    Xanax [Alprazolam] Unknown - High Severity    Amlodipine Swelling    Ativan [Lorazepam]  "Hallucinations    Baclofen Hallucinations    Misc. Sulfonamide Containing Compounds Unknown (See Comments)    Minoxidil Confusion    Tetracycline Rash     bliisters in mouth         Objective   Exam:     Vital Signs  Temp:  [97.9 °F (36.6 °C)-98.5 °F (36.9 °C)] 98.4 °F (36.9 °C)  Heart Rate:  [57-72] 57  Resp:  [18] 18  BP: (136-166)/(63-79) 143/71  SpO2:  [90 %-100 %] 94 %  on  Flow (L/min):  [2] 2;   Device (Oxygen Therapy): humidified;nasal cannula  Body mass index is 32.15 kg/m².       Exam:     /71 (BP Location: Right arm, Patient Position: Lying)   Pulse 57   Temp 98.4 °F (36.9 °C) (Oral)   Resp 18   Ht 172.7 cm (68\")   Wt 95.9 kg (211 lb 6.7 oz)   SpO2 94%   BMI 32.15 kg/m²     General Appearance:    Appears chronically ill, no distress   Head:    Normocephalic, atraumatic   Eyes:     EOM's intact, sclerae anicteric        Ears:    TMs not observed   Nose:   Patent without discharge   Neck:   Supple, JVD   Lungs:     Clear to auscultation bilaterally, respiratory effort is normal   Chest wall:    No tenderness   Heart:    Regular rate and rhythm, S1 and S2 normal, no   rub    or gallop   Abdomen:     Soft, nontender, nondistended,  no masses, no organomegaly   Extremities:   No edema   Neurologic:  No focal deficits.  Speech is fluent.  Conversation is coherent.      Results Review:  I have personally reviewed most recent Data :  [facility]  CBC    Results from last 7 days   Lab Units 06/18/24  0639 06/17/24  0600 06/16/24  0546 06/15/24  0703 06/14/24  0655 06/13/24  0532   WBC 10*3/mm3 6.79 7.06 7.97 7.52 8.80 9.76   HEMOGLOBIN g/dL 10.5* 10.1* 10.4* 10.7* 10.9* 11.1*   PLATELETS 10*3/mm3 256 250 263 268 243 209     CMP   Results from last 7 days   Lab Units 06/19/24  0753 06/18/24  0639 06/17/24  0559 06/16/24  0546 06/15/24  0703 06/14/24  0655 06/13/24  0532 06/12/24  1859 06/12/24  1739   SODIUM mmol/L 139 138 137 136 136 132* 131*  --   --    POTASSIUM mmol/L 4.2 4.4 4.5 " 4.4 4.1 4.2 3.9  --   --    CHLORIDE mmol/L 105 105 105 101 100 98 97*  --   --    CO2 mmol/L 24.0 20.6* 22.0 20.5* 20.9* 20.2* 21.5*  --   --    BUN mg/dL 35* 27* 53* 44* 66* 54* 49*  --   --    CREATININE mg/dL 3.64* 3.46* 5.23* 4.81* 6.93* 5.83* 5.26*  --   --    GLUCOSE mg/dL 135* 111* 141* 159* 143* 168* 149*  --   --    ALBUMIN g/dL  --   --   --   --   --   --   --   --  2.7*   AMMONIA umol/L  --   --   --   --   --   --   --  31  --      ABG    Results from last 7 days   Lab Units 06/15/24  1613   PH, ARTERIAL pH units 7.296*   PCO2, ARTERIAL mm Hg 48.6*   PO2 ART mm Hg 62.1*   O2 SATURATION ART % 88.5*   BASE EXCESS ART mmol/L -3.0*       XR Chest Post CVA Port    Result Date: 6/15/2024  1. Since yesterday's portable chest x-ray on 6/14/2024 at 2:15 p.m., there is been interval placement of a right internal jugular central line-Shiley catheter and its distal tip projects over the superior vena cava in good position and no pneumothorax is seen. Otherwise, there has been no change when compared to yesterday's chest x-ray 6/14/2024 at 2:15 p.m.  2. The patient's had previous median sternotomy there is prominent blunting of the left lateral costophrenic angle and some hazy density over the inferior left hemithorax likely secondary to at least a small to moderate size up to moderate size left pleural effusion there is some dense airspace consolidation in the left lower lung zone left lung base likely compressive atelectasis associated with the left pleural effusion although I cannot exclude superimposed pneumonia at the left lower lobe or lung base and correlate clinically. No additional active disease is seen in the chest.  This report was finalized on 6/15/2024 5:23 PM by Dr. Jaquan Mendieta M.D on Workstation: [id number]      XR Chest 1 View    Result Date: 6/14/2024  As described.    This report was finalized on 6/14/2024 2:42 PM by Dr. Gabe Zimmer M.D on Workstation: [id number]       Results for orders  placed during the hospital encounter of 05/30/24    Adult Transthoracic Echo Complete W/ Cont if Necessary Per Protocol    Interpretation Summary    Left ventricular systolic function is hyperdynamic (EF > 70%). Calculated left ventricular EF = 75% Global longitudinal LV strain (GLS) = -17.8%. Left ventricle strain data was reviewed by the physician and found to be accurate. Global longitudinal LV strain is normal however there is regional abnormality with apical sparing suggestive of amyloid heart disease. However there is also basal ptal hypertrophy suggestive of hypertrophic cardiomyopathy. Wall motion abnormality is also noted in the basal septum and cannot rule out ischemic component.Consider additional imaging such as cardiac MRI and further evaluation also for amyloid heart disease as clinically indicated. The left ventricular cavity is small in size. Left ventricular wall thickness is consistent with moderate to severe septal asymmetric hypertrophy. There is hypokinesis of the left ventricular basal septum. Left ventricular diastolic function is consistent with (grade II w/high LAP) pseudonormalization.    The left atrial cavity is mildly dilated.    No aortic valve regurgitation or stenosis is present. The aortic valve is abnormal in structure. There is mild thickening of the aortic valve.    There is mild, bileaflet mitral valve thickening present. Trace mitral valve regurgitation is present. No significant mitral valve stenosis is present.    Moderate tricuspid valve regurgitation is present. Estimated right ventricular systolic pressure from tricuspid regurgitation is markedly elevated (>55 mmHg). Calculated right ventricular systolic pressure from tricuspid regurgitation is 74 mmHg.        Assessment & Plan   Assessment and Plan:         Acute UTI (urinary tract infection)    Type 2 diabetes mellitus with hyperglycemia, with long-term current use of insulin    CAD (coronary artery disease)    Hx of  CABG    Chronic diastolic heart failure    CSA (central sleep apnea)    HTN (hypertension)    History of stroke    CKD (chronic kidney disease) stage 3, GFR 30-59 ml/min    Peripheral arterial disease    Sepsis    Hyperkalemia    Metabolic encephalopathy    AMENA (acute kidney injury)    Paroxysmal atrial fibrillation    ASSESSMENT:  AMENA likely prerenal ATN 2/2 urosepsis with hemodynamic changes; on CKD III etiology likely diabetic and hypertensive nephropathy with baseline sCr ~0.9-1.2 mg/dL  Hyperkalemia, now resolved  UTI, urine cultures with gram neg bacilli  Leukocytosis  Metabolic encephalopathy  PAD  Hx stroke  HTN  CHF  CAD  DM2  Chronic splenic vein thrombosis  Possible hepatocellular disease/cirrhosis  Likely benign subpleural nodule with bilateral lymphadenopathy  Hiatal hernia    Last TTE 10/5/22 with EF 66%, grade II DD    PLAN :     AMENA likely prerenal ATN 2/2 urosepsis with hemodynamic changes; on CKD III etiology likely diabetic and hypertensive nephropathy with baseline sCr ~0.9-1.2 mg/dL  Initiated on dialysis yesterday 6/15 due to worsening renal function. Tolerated well, ran even  Patient is more alert oriented and clinically patient getting better  At this time better creatinine is only 0.2 in last 24 hours which is acceptable with urine output of more than 1.4 L I do see some recovery at this time  Follow-up patient tomorrow if creatinine stable most likely I will get the hemodialysis catheter removed by Friday  No dialysis today  No need for Bumex at this time  Cardiology following for new onset A-fib, evaluation noted  Avoid NSAIDs, nephrotoxic agents.   We will follow and coordinate with team  Monitor closely for renal recovery

## 2024-06-19 NOTE — PROGRESS NOTES
[facility]     LOS: 20 days     Subjective:    CC:Altered Mental Status    DIET:  Diet Order   Procedures    Diet: Regular/House, Diabetic, Cardiac; Healthy Heart (2-3 Na+); Consistent Carbohydrate; Texture: Regular (IDDSI 7); Fluid Consistency: Thin (IDDSI 0)     Mental status is much improved, answers questions surprisingly well, patient tells me multiple stories about his collection of paintings and other items    Objective:    Vital Signs:  Temp:  [97.9 °F (36.6 °C)-98.5 °F (36.9 °C)] 98.4 °F (36.9 °C)  Heart Rate:  [57-72] 57  Resp:  [18] 18  BP: (136-166)/(63-79) 143/71    SpO2:  [90 %-100 %] 94 %  on  Flow (L/min):  [2] 2;   Device (Oxygen Therapy): humidified;nasal cannula  Body mass index is 32.15 kg/m².    Physical Exam  Constitutional:       Appearance: Normal appearance.   HENT:      Head: Normocephalic and atraumatic.   Cardiovascular:      Rate and Rhythm: Normal rate and regular rhythm.      Heart sounds: No murmur heard.     No friction rub.   Pulmonary:      Effort: Pulmonary effort is normal.      Breath sounds: Normal breath sounds.   Abdominal:      General: Bowel sounds are normal. There is no distension.      Palpations: Abdomen is soft.      Tenderness: There is no abdominal tenderness.   Skin:     General: Skin is warm and dry.   Neurological:      Mental Status: He is alert.   Psychiatric:         Mood and Affect: Mood normal.         Behavior: Behavior normal.         Results Review:    Glucose   Date Value Ref Range Status   06/19/2024 135 (H) 65 - 99 mg/dL Final   06/18/2024 111 (H) 65 - 99 mg/dL Final   06/17/2024 141 (H) 65 - 99 mg/dL Final     Results from last 7 days   Lab Units 06/18/24  0639   WBC 10*3/mm3 6.79   HEMOGLOBIN g/dL 10.5*   HEMATOCRIT % 31.9*   PLATELETS 10*3/mm3 256     Results from last 7 days   Lab Units 06/19/24  0753   SODIUM mmol/L 139   POTASSIUM mmol/L 4.2   CHLORIDE mmol/L 105   CO2 mmol/L 24.0   BUN mg/dL 35*   CREATININE mg/dL  "3.64*   CALCIUM mg/dL 8.3*   GLUCOSE mg/dL 135*     Results from last 7 days   Lab Units 06/19/24  0753 06/14/24  0655 06/13/24 2019   INR   --   --  1.17*   APTT seconds 81.3*   < > 48.4*    < > = values in this interval not displayed.             Cultures:  No results found for: \"BLOODCX\", \"URINECX\", \"WOUNDCX\", \"MRSACX\", \"RESPCX\", \"STOOLCX\"    I have reviewed daily medications and changes in CPOE    Scheduled meds  amiodarone, 200 mg, Oral, Q24H  [Held by provider] apixaban, 5 mg, Oral, Q12H  aspirin, 81 mg, Oral, Daily  brimonidine, 1 drop, Both Eyes, BID  DULoxetine, 30 mg, Oral, Daily  guaiFENesin, 600 mg, Oral, Q12H  insulin lispro, 2-9 Units, Subcutaneous, 4x Daily AC & at Bedtime  ipratropium-albuterol, 3 mL, Nebulization, 4x Daily - RT  latanoprost, 1 drop, Both Eyes, Nightly  Menthol-Zinc Oxide, 1 Application, Topical, Q12H  metoprolol tartrate, 12.5 mg, Oral, Q12H  midodrine, 10 mg, Oral, TID AC  OLANZapine, 5 mg, Oral, Nightly  senna-docusate sodium, 2 tablet, Oral, BID  sodium bicarbonate, 650 mg, Oral, TID  sodium chloride, 10 mL, Intravenous, Q12H  sodium chloride, 10 mL, Intravenous, Q12H  sodium chloride, 4 mL, Nebulization, BID - RT  terazosin, 1 mg, Oral, Nightly  thiamine, 100 mg, Intravenous, Daily  vancomycin, 1,000 mg, Intravenous, Once        heparin, 10.4 Units/kg/hr, Last Rate: 16.371 Units/kg/hr (06/19/24 1018)  Pharmacy to dose vancomycin,       PRN meds    acetaminophen **OR** acetaminophen **OR** acetaminophen    senna-docusate sodium **AND** polyethylene glycol **AND** bisacodyl **AND** bisacodyl    dextrose    dextrose    glucagon (human recombinant)    heparin (porcine)    nitroglycerin    OLANZapine    ondansetron    Pharmacy to dose vancomycin    sodium chloride    sodium chloride    sodium chloride    sodium chloride    traMADol        Acute UTI (urinary tract infection)    Type 2 diabetes mellitus with hyperglycemia, with long-term current use of insulin    CAD (coronary artery " disease)    Hx of CABG    Chronic diastolic heart failure    CSA (central sleep apnea)    HTN (hypertension)    History of stroke    CKD (chronic kidney disease) stage 3, GFR 30-59 ml/min    Peripheral arterial disease    Sepsis    Hyperkalemia    Metabolic encephalopathy    AMENA (acute kidney injury)    Paroxysmal atrial fibrillation        Assessment/Plan:    74 y.o. male admitted with Acute UTI (urinary tract infection).     Assessment and plan  Acute UTI/LLE Cellulitis  - urine culture grew Serratia  - completed 9d of abx-cefepime was discontinued 6/8 due to high suspicion of this causing toxic encephalopathy.  -Per ID:Despite the fact that he is negative MRSA screen his right arm-continue with IV vancomycin for phlebitis and secondary cellulitis of the left arm as it is showing improvement.  Continue antibiotic treatment for 7 days with continued local care elevation and monitoring and local care of the scabbed wound on the medial aspect of the midforearm.      HTNCAD/PAD/New Onset Afib (PAF)/Chronic Diastolic CHF  - BP acceptable, no anginal symptoms, in NSR  - continue on current regimen  - heparin gtt, hold on eliquis until we know for sure he does not need a tunnel cath     Type 2 DM  - had hypoglycemia, improved off of lantus  - continue D5W, getting thiamine also  - continue coverage with ssi/hypoglycemia protocol     AMENA  -Nephrology on board, follow management recommendations.  -Initiated on dialysis, mental status improving after dialysis management.     Hypoxia  - CXR noted, improved after additional dose of bumex  - monitor and encourage pulmonary toilet as able     Delirium/Toxic Encephalopathy  - continue on scheduled and PRN zyprexa  - he is current with palliatus at home and takes PRN tramadol, this has been restarted     Hypokalemia  - replace K+     Midodrine reduce dose for HTN  Arm looks great, no evidence of infection, swelling is controlled  Still on heparin and change to eliquis when we  have final plans about dialysis    Que Shah MD  06/19/24  14:17 EDT

## 2024-06-19 NOTE — THERAPY RE-EVALUATION
Acute Care - Speech Language Pathology   Swallow Re-Evaluation [facility]     Patient Name: Dilip Verma  : 1950  MRN: [id number]  Today's Date: 2024               Admit Date: 2024    Visit Dx:     ICD-10-CM ICD-9-CM   1. Acute respiratory failure with hypoxia  J96.01 518.81   2. Sepsis without acute organ dysfunction, due to unspecified organism  A41.9 038.9     995.91   3. Hyperkalemia  E87.5 276.7   4. Metabolic encephalopathy  G93.41 348.31   5. Acute UTI  N39.0 599.0   6. Hyperglycemia due to diabetes mellitus  E11.65 250.02   7. Chronic heart failure with preserved ejection fraction (HFpEF)  I50.32 428.9   8. Acute UTI (urinary tract infection)  N39.0 599.0     Patient Active Problem List   Diagnosis    Anxiety    Colon polyp    Type 2 diabetes mellitus with hyperglycemia, with long-term current use of insulin    Erectile dysfunction    Hyperlipidemia    Type 2 acute myocardial infarction    CAD (coronary artery disease)    Hx of CABG    Chronic diastolic heart failure    Hypersomnia due to medical condition    CSA (central sleep apnea)    Periodic breathing    HTN (hypertension)    History of stroke    CKD (chronic kidney disease) stage 3, GFR 30-59 ml/min    Urinary retention    Acute on chronic renal failure    Acute UTI (urinary tract infection)    Acute on chronic diastolic (congestive) heart failure    Bacteriuria    Rectal pain    Status post total replacement of hip    Vitamin D deficiency    Post-acute COVID-19 syndrome    Insulin dose changed    Arthropathy associated with neurological disorder    Personal history of colonic polyps    Type 2 diabetes mellitus with diabetic neuropathy, with long-term current use of insulin    Cervical spinal stenosis    Abscess of skin    Overweight    Cervical stenosis of spine    Spinal stenosis, lumbar region, without neurogenic claudication    Medically noncompliant    Chronic heart failure with preserved ejection fraction (HFpEF)     Carotid stenosis    Peripheral arterial disease    Sepsis    Hyperkalemia    Metabolic encephalopathy    AMENA (acute kidney injury)    Paroxysmal atrial fibrillation     Past Medical History:   Diagnosis Date    AMENA (acute kidney injury) 12/03/2020    Alcoholism 1989    not since 1998    CORI positive     Anemia     Anxiety     Ataxia     Atypical chest pain 04/19/2022    SEEN AT [facility] ER    Balance disorder     Carotid stenosis 3/28/2024    Cataract     BILATERAL, S/P EXTRACTION    Cervical radiculopathy 11/11/2019    SEEN AT  [facility] ER    Cervical spinal cord compression     Chronic diastolic (congestive) heart failure     Chronic kidney disease     STAGE 3, FOLLOWED BY DR. VIVAR    Chronic pancreatitis     Closed left subtrochanteric femur fracture 12/01/2020    ADMITTED TO [facility]    Closed nondisplaced intertrochanteric fracture of left femur 12/01/2020    ADMITTED TO [facility]    Colon polyps     FOLLOWED BY DR. AVTAR JEONG    Constipation     Contracture, right hand     Coronary artery disease     CABG 7/2019    COVID-19 07/2022    DDD (degenerative disc disease), cervical     Diabetes mellitus, type II     IDDM    Diabetic retinopathy     Difficulty walking     Dysphagia     Elevated brain natriuretic peptide (BNP) level 08/2014    Elevated LFTs 03/2021    Erectile dysfunction     Fissure, anal 2022    Foot drop     Fuchs' corneal dystrophy of right eye     Glaucoma     BILATERAL    History of alcohol abuse     Hyperlipidemia     Hypersomnia     Hypertension     Hypertensive urgency 02/25/2020    ADMITTED TO [facility]    Insomnia     Kidney stones     LV dysfunction 06/2016    Lyme disease     Lymphadenopathy syndrome 05/2021    Macular edema     BILATERAL    Myocardial infarction 11/05/2019    NSTEMI, ADMITTED TO [facility]    Myocardial infarction 07/12/2019    NSTEMI, ADMITTED TO [facility]    Neuropathy in diabetes     Non-celiac gluten sensitivity     Orthostasis     Osteoporosis     Oxygen dependent     PAD (peripheral artery disease)   "   Paroxysmal atrial fibrillation 6/6/2024    PNA (pneumonia) 06/2016    LEFT LOBE    Polyneuropathy     PTSD (post-traumatic stress disorder)     Pulmonary hypertension     Pulmonary nodule     Senile ectropion of both lower eyelids 02/2022    Sepsis 12/16/2020    D/T UTI, ADMITTED TO [facility]    Sleep apnea     STATES DOESN'T USE BIPAP OR CPAP    Spinal stenosis in cervical region     SEVERE-LIMITED ROM    Stroke 2012    \"slight stroke\"    Syncope and collapse 07/06/2019    ADMITTED TO [facility]    Urinary retention 04/05/2021    SEEN AT [facility] ER    Vision loss     Vitamin D deficiency      Past Surgical History:   Procedure Laterality Date    CARDIAC CATHETERIZATION N/A 07/15/2019    Procedure: Coronary angiography;  Surgeon: Carrie Price MD;  Location:  [facility];  Service: Cardiovascular    CARDIAC CATHETERIZATION N/A 07/15/2019    Procedure: Left Heart Cath;  Surgeon: Carrie Price MD;  Location:  [facility];  Service: Cardiovascular    CARDIAC CATHETERIZATION N/A 07/15/2019    Procedure: Left ventriculography;  Surgeon: Carrie Price MD;  Location:  [facility];  Service: Cardiovascular    CARDIAC CATHETERIZATION  07/15/2019    Procedure: Functional Flow [place];  Surgeon: Carrie Price MD;  Location:  [facility];  Service: Cardiovascular    CARDIAC CATHETERIZATION N/A 11/06/2019    Procedure: Right and Left Heart Cath;  Surgeon: Mya Smith MD;  Location:  [facility];  Service: Cardiovascular    CARDIAC CATHETERIZATION N/A 11/06/2019    Procedure: Coronary angiography;  Surgeon: Mya Smith MD;  Location:  [facility];  Service: Cardiovascular    CARDIAC SURGERY      CATARACT EXTRACTION Left 2014    CATARACT EXTRACTION Right 11/2016    PHACO/IOL, DR. LEN DENTON    COLONOSCOPY N/A 03/16/2023    ENTIRE COLON WNL, RESCOPE IN 5 YRS, DR. LINDA LIZARRAGA AT [facility]    COLONOSCOPY W/ POLYPECTOMY N/A " 01/02/2015    A FEW DIVERTICULA IN SIGMOID, 6 MM TUBULOVILLOUS ADENOMA POLYP IN RECTUM, SMALL HEMORRHOIDS, MELANOSIS COLI, DR. AVTAR JEONG AT [facility]    CORONARY ARTERY BYPASS GRAFT N/A 07/18/2019    Procedure: INTRAOPERATIVE SHOAIB; STERNOTOMY CORONARY ARTERY BYPASS x 3  USING LEFT INTERNAL MAMMARY ARTERY GRAFT UTILIZING ENDOSCOPICALLY HARVESTED RIGHT GREATER SAPHENOUS VEIN AND PRP.;  Surgeon: Bill Devi MD;  Location: [facility] OR;  Service: Cardiothoracic    CYSTOSCOPY BLADDER STONE LITHOTRIPSY N/A     DENTAL PROCEDURE Bilateral     3 surgeries inder implants    FEMUR IM NAILING/RODDING Left 12/03/2020    Procedure: LEFT HIP INTRAMEDULLARY NAIL;  Surgeon: Niraj Ravi MD;  Location: [facility] OR;  Service: Orthopedic Spine;  Laterality: Left;    INCISION AND DRAINAGE PERIRECTAL ABSCESS N/A 10/04/2023    Procedure: Incision and drainage of perianal and buttock abscess;  Surgeon: Herbie Villatoro MD;  Location: [facility] OR;  Service: General;  Laterality: N/A;    INGUINAL HERNIA REPAIR Bilateral     TOENAIL EXCISION  06/2022    TONSILLECTOMY Bilateral     TOTAL HIP ARTHROPLASTY REVISION Left 01/11/2022    Procedure: TOTAL HIP ARTHROPLASTY REVISION- POSTERIOR;  Surgeon: Jurgen Candelaria II, MD;  Location: [facility] OR;  Service: Orthopedics;  Laterality: Left;    VASECTOMY N/A        SLP Recommendation and Plan  SLP Swallowing Diagnosis: functional oral phase, functional pharyngeal phase (06/19/24 1215)  SLP Diet Recommendation: regular textures, thin liquids (06/19/24 1215)  Recommended Precautions and Strategies: upright posture during/after eating, small bites of food and sips of liquid, general aspiration precautions, 1:1 supervision, assist with feeding (06/19/24 1215)  SLP Rec. for Method of Medication Administration: meds whole, with puree, as tolerated (06/19/24 1215)     Monitor for Signs of Aspiration: yes, notify SLP if any concerns (06/19/24 1215)     Swallow  Criteria for Skilled Therapeutic Interventions Met: demonstrates skilled criteria (06/19/24 1215)  Anticipated Discharge Disposition (SLP): unknown (06/19/24 1215)  Rehab Potential/Prognosis, Swallowing: good, to achieve stated therapy goals (06/19/24 1215)  Therapy Frequency (Swallow): PRN (06/19/24 1215)  Predicted Duration Therapy Intervention (Days): until discharge (06/19/24 1215)  Oral Care Recommendations: Oral Care BID/PRN (06/19/24 1215)                                        Plan of Care Reviewed With: patient  Outcome Evaluation: Re-eval of swallow completed. Recommend regular textures and thin liquids. Meds whole with puree. Recommend upright, general aspiration precautions, intermittent supervision, and oral care s/p all PO. SLP to follow for diet tolerance.      SWALLOW EVALUATION (Last 72 Hours)       SLP Adult Swallow Evaluation       Row Name 06/19/24 1215                   Rehab Evaluation    Document Type re-evaluation  -OC        Subjective Information no complaints  -OC        Patient Observations alert;cooperative;agree to therapy  -OC        Patient Effort good  -OC        Symptoms Noted During/After Treatment none  -OC           General Information    Patient Profile Reviewed yes  -OC        Current Method of Nutrition soft to chew textures;ground;thin liquids  -OC        Precautions/Limitations, Vision WFL;for purposes of eval  -OC        Precautions/Limitations, Hearing WFL;for purposes of eval  -OC        Prior Level of Function-Communication unknown  -OC        Prior Level of Function-Swallowing no diet consistency restrictions  -OC        Plans/Goals Discussed with patient;agreed upon  -OC        Barriers to Rehab cognitive status  -OC           Pain Scale: Numbers Pre/Post-Treatment    Pretreatment Pain Rating 0/10 - no pain  -OC        Posttreatment Pain Rating 0/10 - no pain  -OC           Oral Motor Structure and Function    Dentition Assessment other (see comments)  reports implants   -OC        Secretion Management WNL/WFL  -OC        Mucosal Quality moist, healthy  -OC           Oral Musculature and Cranial Nerve Assessment    Oral Motor General Assessment WFL  -OC           Clinical Swallow Eval    Clinical Swallow Evaluation Summary Re-eval of swallow completed. Patient alert and agreeable to PO trials.  No overt s/s aspiration with trials of thin via cup/straw and regular textures. Limited regular texture, mastication functional. Independent use of liquid wash. No oral residue.  -OC           SLP Evaluation Clinical Impression    SLP Swallowing Diagnosis functional oral phase;functional pharyngeal phase  -OC        Functional Impact risk of aspiration/pneumonia  -OC        Rehab Potential/Prognosis, Swallowing good, to achieve stated therapy goals  -OC        Swallow Criteria for Skilled Therapeutic Interventions Met demonstrates skilled criteria  -OC           Recommendations    Therapy Frequency (Swallow) PRN  -OC        Predicted Duration Therapy Intervention (Days) until discharge  -OC        SLP Diet Recommendation regular textures;thin liquids  -OC        Recommended Precautions and Strategies upright posture during/after eating;small bites of food and sips of liquid;general aspiration precautions;1:1 supervision;assist with feeding  -OC        Oral Care Recommendations Oral Care BID/PRN  -OC        SLP Rec. for Method of Medication Administration meds whole;with puree;as tolerated  -OC        Monitor for Signs of Aspiration yes;notify SLP if any concerns  -OC        Anticipated Discharge Disposition (SLP) unknown  -OC           (LTG) Patient will demonstrate functional swallow for    Diet Texture (Demonstrate functional swallow) regular textures  -OC        Liquid viscosity (Demonstrate functional swallow) thin liquids  -OC                  User Key  (r) = Recorded By, (t) = Taken By, (c) = Cosigned By      Initials Name Effective Dates    OC Cheryl, BRADLEY Winkler 08/28/23 -                      EDUCATION  The patient has been educated in the following areas:   Dysphagia (Swallowing Impairment).        SLP GOALS       Row Name 06/19/24 1215             (LTG) Patient will demonstrate functional swallow for    Diet Texture (Demonstrate functional swallow) regular textures  -OC      Liquid viscosity (Demonstrate functional swallow) thin liquids  -OC                User Key  (r) = Recorded By, (t) = Taken By, (c) = Cosigned By      Initials Name Provider Type    Cathi Miranda SLP Speech and Language Pathologist                         Time Calculation:    Time Calculation- SLP       Row Name 06/19/24 1325             Time Calculation- SLP    SLP Start Time 1325  -OC      SLP Received On 06/19/24  -OC         Untimed Charges    SLP Treatment ST Treatment Swallow Minutes  - 72514  -OC      [id number] Treatment Swallow Minutes 60  -OC         Total Minutes    Untimed Charges Total Minutes 60  -OC       Total Minutes 60  -OC                User Key  (r) = Recorded By, (t) = Taken By, (c) = Cosigned By      Initials Name Provider Type    Cathi Miranda SLP Speech and Language Pathologist                    Therapy Charges for Today       Code Description Service Date Service Provider Modifiers Qty    [id number]  ST TREATMENT SWALLOW 4 6/19/2024 Cathi Luna SLP GN 1                 BRADLEY Scott  6/19/2024

## 2024-06-19 NOTE — CASE MANAGEMENT/SOCIAL WORK
Continued Stay Note  [facility]     Patient Name: Dilip Verma  MRN: [id number]  Today's Date: 6/19/2024    Admit Date: 5/30/2024    Plan: Home with    Discharge Plan       Row Name 06/19/24 1443       Plan    Plan Home with     Patient/Family in Agreement with Plan yes    Plan Comments CCP reviewed chart and discussed with primary RNKarolina  has accepted. Renal hopeful for d/c of TDC Friday if labs continue to improve. Pt wife refusing various treatments/antibiotics. ST re-eval allowing upgrade of diet. Nurse reports pt still confused but not combative. Pt IV abx complete this shift if wife allows to be administered. Pt still requiring Max assist of 2 people to mobility very little. Wife still insistent pt return home with . CCP will continue to follow - Nabila GONZALEZ                   Discharge Codes    No documentation.                 Expected Discharge Date and Time       Expected Discharge Date Expected Discharge Time    Jun 21, 2024               Nabila Ramsey RN

## 2024-06-20 LAB
ANION GAP SERPL CALCULATED.3IONS-SCNC: 8 MMOL/L (ref 5–15)
APTT PPP: 82.8 SECONDS (ref 22.7–35.4)
BUN SERPL-MCNC: 38 MG/DL (ref 8–23)
BUN/CREAT SERPL: 11.3 (ref 7–25)
CALCIUM SPEC-SCNC: 8.4 MG/DL (ref 8.6–10.5)
CHLORIDE SERPL-SCNC: 104 MMOL/L (ref 98–107)
CO2 SERPL-SCNC: 27 MMOL/L (ref 22–29)
CREAT SERPL-MCNC: 3.36 MG/DL (ref 0.76–1.27)
EGFRCR SERPLBLD CKD-EPI 2021: 18.3 ML/MIN/1.73
GLUCOSE BLDC GLUCOMTR-MCNC: 194 MG/DL (ref 70–130)
GLUCOSE BLDC GLUCOMTR-MCNC: 195 MG/DL (ref 70–130)
GLUCOSE BLDC GLUCOMTR-MCNC: 213 MG/DL (ref 70–130)
GLUCOSE BLDC GLUCOMTR-MCNC: 220 MG/DL (ref 70–130)
GLUCOSE SERPL-MCNC: 195 MG/DL (ref 65–99)
POTASSIUM SERPL-SCNC: 4.4 MMOL/L (ref 3.5–5.2)
SODIUM SERPL-SCNC: 139 MMOL/L (ref 136–145)

## 2024-06-20 PROCEDURE — 94799 UNLISTED PULMONARY SVC/PX: CPT

## 2024-06-20 PROCEDURE — 80048 BASIC METABOLIC PNL TOTAL CA: CPT

## 2024-06-20 PROCEDURE — 25010000002 HEPARIN (PORCINE) 25000-0.45 UT/250ML-% SOLUTION: Performed by: INTERNAL MEDICINE

## 2024-06-20 PROCEDURE — 63710000001 INSULIN LISPRO (HUMAN) PER 5 UNITS: Performed by: INTERNAL MEDICINE

## 2024-06-20 PROCEDURE — 94761 N-INVAS EAR/PLS OXIMETRY MLT: CPT

## 2024-06-20 PROCEDURE — 85730 THROMBOPLASTIN TIME PARTIAL: CPT | Performed by: INTERNAL MEDICINE

## 2024-06-20 PROCEDURE — 94664 DEMO&/EVAL PT USE INHALER: CPT

## 2024-06-20 PROCEDURE — 94760 N-INVAS EAR/PLS OXIMETRY 1: CPT

## 2024-06-20 PROCEDURE — 25010000002 THIAMINE HCL 200 MG/2ML SOLUTION: Performed by: INTERNAL MEDICINE

## 2024-06-20 PROCEDURE — 82948 REAGENT STRIP/BLOOD GLUCOSE: CPT

## 2024-06-20 RX ADMIN — INSULIN LISPRO 2 UNITS: 100 INJECTION, SOLUTION INTRAVENOUS; SUBCUTANEOUS at 23:14

## 2024-06-20 RX ADMIN — BRIMONIDINE TARTRATE 1 DROP: 2 SOLUTION OPHTHALMIC at 20:37

## 2024-06-20 RX ADMIN — LATANOPROST 1 DROP: 50 SOLUTION OPHTHALMIC at 20:37

## 2024-06-20 RX ADMIN — Medication 4 ML: at 20:20

## 2024-06-20 RX ADMIN — POLYETHYLENE GLYCOL 3350 17 G: 17 POWDER, FOR SOLUTION ORAL at 09:14

## 2024-06-20 RX ADMIN — SENNOSIDES AND DOCUSATE SODIUM 2 TABLET: 50; 8.6 TABLET ORAL at 09:13

## 2024-06-20 RX ADMIN — Medication 10 ML: at 20:36

## 2024-06-20 RX ADMIN — TRAMADOL HYDROCHLORIDE 50 MG: 50 TABLET ORAL at 23:13

## 2024-06-20 RX ADMIN — METOPROLOL TARTRATE 12.5 MG: 25 TABLET, FILM COATED ORAL at 09:14

## 2024-06-20 RX ADMIN — TRAMADOL HYDROCHLORIDE 50 MG: 50 TABLET ORAL at 04:44

## 2024-06-20 RX ADMIN — Medication 1 APPLICATION: at 22:08

## 2024-06-20 RX ADMIN — HEPARIN SODIUM 16.4 UNITS/KG/HR: 10000 INJECTION, SOLUTION INTRAVENOUS at 02:39

## 2024-06-20 RX ADMIN — ASPIRIN 81 MG: 81 TABLET, CHEWABLE ORAL at 09:14

## 2024-06-20 RX ADMIN — Medication 1 APPLICATION: at 09:22

## 2024-06-20 RX ADMIN — SODIUM BICARBONATE 650 MG: 650 TABLET, ORALLY DISINTEGRATING ORAL at 20:36

## 2024-06-20 RX ADMIN — INSULIN LISPRO 4 UNITS: 100 INJECTION, SOLUTION INTRAVENOUS; SUBCUTANEOUS at 13:00

## 2024-06-20 RX ADMIN — SODIUM BICARBONATE 650 MG: 650 TABLET, ORALLY DISINTEGRATING ORAL at 18:50

## 2024-06-20 RX ADMIN — SODIUM BICARBONATE 650 MG: 650 TABLET, ORALLY DISINTEGRATING ORAL at 09:14

## 2024-06-20 RX ADMIN — Medication 10 ML: at 09:25

## 2024-06-20 RX ADMIN — IPRATROPIUM BROMIDE AND ALBUTEROL SULFATE 3 ML: .5; 3 SOLUTION RESPIRATORY (INHALATION) at 10:47

## 2024-06-20 RX ADMIN — SENNOSIDES AND DOCUSATE SODIUM 2 TABLET: 50; 8.6 TABLET ORAL at 20:36

## 2024-06-20 RX ADMIN — IPRATROPIUM BROMIDE AND ALBUTEROL SULFATE 3 ML: .5; 3 SOLUTION RESPIRATORY (INHALATION) at 15:24

## 2024-06-20 RX ADMIN — GUAIFENESIN 600 MG: 600 TABLET, EXTENDED RELEASE ORAL at 09:13

## 2024-06-20 RX ADMIN — TERAZOSIN HYDROCHLORIDE 1 MG: 1 CAPSULE ORAL at 20:36

## 2024-06-20 RX ADMIN — IPRATROPIUM BROMIDE AND ALBUTEROL SULFATE 3 ML: .5; 3 SOLUTION RESPIRATORY (INHALATION) at 08:07

## 2024-06-20 RX ADMIN — Medication 4 ML: at 08:08

## 2024-06-20 RX ADMIN — AMIODARONE HYDROCHLORIDE 200 MG: 200 TABLET ORAL at 09:14

## 2024-06-20 RX ADMIN — INSULIN LISPRO 4 UNITS: 100 INJECTION, SOLUTION INTRAVENOUS; SUBCUTANEOUS at 09:13

## 2024-06-20 RX ADMIN — GUAIFENESIN 600 MG: 600 TABLET, EXTENDED RELEASE ORAL at 20:36

## 2024-06-20 RX ADMIN — INSULIN LISPRO 2 UNITS: 100 INJECTION, SOLUTION INTRAVENOUS; SUBCUTANEOUS at 18:50

## 2024-06-20 RX ADMIN — DULOXETINE HYDROCHLORIDE 30 MG: 30 CAPSULE, DELAYED RELEASE ORAL at 20:36

## 2024-06-20 RX ADMIN — THIAMINE HYDROCHLORIDE 100 MG: 100 INJECTION, SOLUTION INTRAMUSCULAR; INTRAVENOUS at 09:16

## 2024-06-20 RX ADMIN — METOPROLOL TARTRATE 12.5 MG: 25 TABLET, FILM COATED ORAL at 20:36

## 2024-06-20 RX ADMIN — OLANZAPINE 5 MG: 5 TABLET, FILM COATED ORAL at 20:36

## 2024-06-20 RX ADMIN — IPRATROPIUM BROMIDE AND ALBUTEROL SULFATE 3 ML: .5; 3 SOLUTION RESPIRATORY (INHALATION) at 20:20

## 2024-06-20 RX ADMIN — Medication 10 ML: at 22:08

## 2024-06-20 RX ADMIN — BRIMONIDINE TARTRATE 1 DROP: 2 SOLUTION OPHTHALMIC at 09:19

## 2024-06-20 NOTE — PROGRESS NOTES
Infectious Diseases Progress Note    Tino Nagel MD     [facility]  Los: 21 days  Patient Identification:  Name: Dilip Verma  Age: 74 y.o.  Sex: male  :  1950  MRN: [id number]         Primary Care Physician: Fernando Camargo, DO        Subjective: Doing well and fed himself.  Alert and oriented.  Following commands.  Denies any new pain discomfort or fever.  Denies any discomfort and issues with left arm and left leg..  Interval History: See consultation note.  2024 MRSA screen is negative.  2024 completed IV vancomycin for left arm cellulitis and phlebitis with improvement  Objective:    Scheduled Meds:amiodarone, 200 mg, Oral, Q24H  [Held by provider] apixaban, 5 mg, Oral, Q12H  aspirin, 81 mg, Oral, Daily  brimonidine, 1 drop, Both Eyes, BID  DULoxetine, 30 mg, Oral, Daily  guaiFENesin, 600 mg, Oral, Q12H  insulin lispro, 2-9 Units, Subcutaneous, 4x Daily AC & at Bedtime  ipratropium-albuterol, 3 mL, Nebulization, 4x Daily - RT  latanoprost, 1 drop, Both Eyes, Nightly  Menthol-Zinc Oxide, 1 Application, Topical, Q12H  metoprolol tartrate, 12.5 mg, Oral, Q12H  OLANZapine, 5 mg, Oral, Nightly  polyethylene glycol, 17 g, Oral, Daily  senna-docusate sodium, 2 tablet, Oral, BID  sodium bicarbonate, 650 mg, Oral, TID  sodium chloride, 10 mL, Intravenous, Q12H  sodium chloride, 10 mL, Intravenous, Q12H  sodium chloride, 4 mL, Nebulization, BID - RT  terazosin, 1 mg, Oral, Nightly  thiamine, 100 mg, Intravenous, Daily      Continuous Infusions:heparin, 10.4 Units/kg/hr, Last Rate: 16.4 Units/kg/hr (24)            Vital signs in last 24 hours:  Temp:  [97.9 °F (36.6 °C)-98.4 °F (36.9 °C)] 98.2 °F (36.8 °C)  Heart Rate:  [57-66] 66  Resp:  [18-22] 20  BP: (131-177)/(57-82) 162/76    Intake/Output:    Intake/Output Summary (Last 24 hours) at 2024 0816  Last data filed at 2024 0600  Gross per 24 hour   Intake 720 ml   Output 1250 ml   Net -530 ml           Exam:  BP  "162/76 (BP Location: Right arm, Patient Position: Lying)   Pulse 66   Temp 98.2 °F (36.8 °C) (Oral)   Resp 20   Ht 172.7 cm (68\")   Wt 95.9 kg (211 lb 6.7 oz)   SpO2 90%   BMI 32.15 kg/m²   Patient is examined using the personal protective equipment as per guidelines from infection control for this particular patient as enacted.  Hand washing was performed before and after patient interaction.  General Appearance: Confused interactive and follows command.                          Head:    Normocephalic, without obvious abnormality, atraumatic                           Eyes:    PERRL, conjunctivae/corneas clear, EOM's intact, both eyes                         Throat:   Lips, tongue, gums normal; oral mucosa pink and moist                           Neck:   Supple, symmetrical, trachea midline, no JVD                         Lungs:    Clear to auscultation bilaterally, respirations unlabored                 Chest Wall:    No tenderness or deformity                          Heart:  S1-S2 regular                  Abdomen:   Soft nontender                 Extremities: Improvement in swelling and redness of the left arm.  Left lower extremity edema and swelling is much improved.                    Neurologic: No acute distress       Data Review:    I reviewed the patient's new clinical results.  Results from last 7 days   Lab Units 06/18/24  0639 06/17/24  0600 06/16/24  0546 06/15/24  0703 06/14/24  0655   WBC 10*3/mm3 6.79 7.06 7.97 7.52 8.80   HEMOGLOBIN g/dL 10.5* 10.1* 10.4* 10.7* 10.9*   PLATELETS 10*3/mm3 256 250 263 268 243     Results from last 7 days   Lab Units 06/19/24  0753 06/18/24  0639 06/17/24  0559 06/16/24  0546 06/15/24  0703 06/14/24  0655   SODIUM mmol/L 139 138 137 136 136 132*   POTASSIUM mmol/L 4.2 4.4 4.5 4.4 4.1 4.2   CHLORIDE mmol/L 105 105 105 101 100 98   CO2 mmol/L 24.0 20.6* 22.0 20.5* 20.9* 20.2*   BUN mg/dL 35* 27* 53* 44* 66* 54*   CREATININE mg/dL 3.64* 3.46* 5.23* 4.81* 6.93* " 5.83*   CALCIUM mg/dL 8.3* 8.2* 8.0* 8.1* 8.2* 8.5*   GLUCOSE mg/dL 135* 111* 141* 159* 143* 168*     Microbiology Results (last 10 days)       ** No results found for the last 240 hours. **            Assessment:    Acute UTI (urinary tract infection)    Type 2 diabetes mellitus with hyperglycemia, with long-term current use of insulin    CAD (coronary artery disease)    Hx of CABG    Chronic diastolic heart failure    CSA (central sleep apnea)    HTN (hypertension)    History of stroke    CKD (chronic kidney disease) stage 3, GFR 30-59 ml/min    Peripheral arterial disease    Sepsis    Hyperkalemia    Metabolic encephalopathy    AMENA (acute kidney injury)    Paroxysmal atrial fibrillation  1-metabolic encephalopathy due to  2-systemic infection as a result of urinary tract infection as well as evolving sepsis traumatic cellulitis of the left leg.  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     Recommendations/Discussions:  Despite the fact that he is negative MRSA screen his right arm-continue with IV vancomycin for phlebitis and secondary cellulitis of the left arm as it is showing improvement.  Completed Vancomycin on 6/19/2024 - going forward observe off antibiotics  Continue with left arm elevation  Continue supportive care per primary team.  Tino Nagel MD  6/20/2024  08:16 EDT    Parts of this note may be an electronic transcription/translation of spoken language to printed text using the Dragon dictation system.

## 2024-06-20 NOTE — PROGRESS NOTES
[facility]     LOS: 21 days     Subjective:    CC:Altered Mental Status    DIET:  Diet Order   Procedures    Diet: Regular/House, Diabetic, Cardiac; Healthy Heart (2-3 Na+); Consistent Carbohydrate; Texture: Regular (IDDSI 7); Fluid Consistency: Thin (IDDSI 0)     Doing better each day  Eating well  Working with PT    Objective:    Vital Signs:  Temp:  [97.9 °F (36.6 °C)-98.4 °F (36.9 °C)] 98.2 °F (36.8 °C)  Heart Rate:  [57-66] 66  Resp:  [18-22] 20  BP: (131-177)/(57-82) 162/76    SpO2:  [90 %-97 %] 90 %  on  Flow (L/min):  [2] 2;   Device (Oxygen Therapy): nasal cannula;humidified  Body mass index is 32.15 kg/m².    Physical Exam  Constitutional:       Appearance: Normal appearance.   HENT:      Head: Normocephalic and atraumatic.   Cardiovascular:      Rate and Rhythm: Normal rate and regular rhythm.      Heart sounds: No murmur heard.     No friction rub.   Pulmonary:      Effort: Pulmonary effort is normal.      Breath sounds: Normal breath sounds.   Abdominal:      General: Bowel sounds are normal. There is no distension.      Palpations: Abdomen is soft.      Tenderness: There is no abdominal tenderness.   Skin:     General: Skin is warm and dry.   Neurological:      Mental Status: He is alert.   Psychiatric:         Mood and Affect: Mood normal.         Behavior: Behavior normal.         Results Review:    Glucose   Date Value Ref Range Status   06/20/2024 195 (H) 65 - 99 mg/dL Final   06/19/2024 135 (H) 65 - 99 mg/dL Final   06/18/2024 111 (H) 65 - 99 mg/dL Final     Results from last 7 days   Lab Units 06/18/24  0639   WBC 10*3/mm3 6.79   HEMOGLOBIN g/dL 10.5*   HEMATOCRIT % 31.9*   PLATELETS 10*3/mm3 256     Results from last 7 days   Lab Units 06/20/24  0822   SODIUM mmol/L 139   POTASSIUM mmol/L 4.4   CHLORIDE mmol/L 104   CO2 mmol/L 27.0   BUN mg/dL 38*   CREATININE mg/dL 3.36*   CALCIUM mg/dL 8.4*   GLUCOSE mg/dL 195*     Results from last 7 days   Lab Units  "06/20/24  0752 06/14/24  0655 06/13/24 2019   INR   --   --  1.17*   APTT seconds 82.8*   < > 48.4*    < > = values in this interval not displayed.             Cultures:  No results found for: \"BLOODCX\", \"URINECX\", \"WOUNDCX\", \"MRSACX\", \"RESPCX\", \"STOOLCX\"    I have reviewed daily medications and changes in CPOE    Scheduled meds  amiodarone, 200 mg, Oral, Q24H  [Held by provider] apixaban, 5 mg, Oral, Q12H  aspirin, 81 mg, Oral, Daily  brimonidine, 1 drop, Both Eyes, BID  DULoxetine, 30 mg, Oral, Daily  guaiFENesin, 600 mg, Oral, Q12H  insulin lispro, 2-9 Units, Subcutaneous, 4x Daily AC & at Bedtime  ipratropium-albuterol, 3 mL, Nebulization, 4x Daily - RT  latanoprost, 1 drop, Both Eyes, Nightly  Menthol-Zinc Oxide, 1 Application, Topical, Q12H  metoprolol tartrate, 12.5 mg, Oral, Q12H  OLANZapine, 5 mg, Oral, Nightly  polyethylene glycol, 17 g, Oral, Daily  senna-docusate sodium, 2 tablet, Oral, BID  sodium bicarbonate, 650 mg, Oral, TID  sodium chloride, 10 mL, Intravenous, Q12H  sodium chloride, 10 mL, Intravenous, Q12H  sodium chloride, 4 mL, Nebulization, BID - RT  terazosin, 1 mg, Oral, Nightly  [START ON 6/21/2024] thiamine, 100 mg, Oral, Daily        heparin, 10.4 Units/kg/hr, Last Rate: 16.4 Units/kg/hr (06/20/24 0239)      PRN meds    acetaminophen **OR** acetaminophen **OR** acetaminophen    senna-docusate sodium **AND** polyethylene glycol **AND** bisacodyl **AND** bisacodyl    dextrose    dextrose    glucagon (human recombinant)    heparin (porcine)    midodrine    nitroglycerin    OLANZapine    ondansetron    sodium chloride    sodium chloride    sodium chloride    sodium chloride    traMADol        Acute UTI (urinary tract infection)    Type 2 diabetes mellitus with hyperglycemia, with long-term current use of insulin    CAD (coronary artery disease)    Hx of CABG    Chronic diastolic heart failure    CSA (central sleep apnea)    HTN (hypertension)    History of stroke    CKD (chronic kidney " disease) stage 3, GFR 30-59 ml/min    Peripheral arterial disease    Sepsis    Hyperkalemia    Metabolic encephalopathy    AMENA (acute kidney injury)    Paroxysmal atrial fibrillation        Assessment/Plan:    74 y.o. male admitted with Acute UTI (urinary tract infection).     Assessment and plan  Acute UTI/LLE Cellulitis  - urine culture grew Serratia  - completed 9d of abx-cefepime was discontinued 6/8 due to high suspicion of this causing toxic encephalopathy.  -Per ID:Despite the fact that he is negative MRSA screen his right arm-continue with IV vancomycin for phlebitis and secondary cellulitis of the left arm as it is showing improvement.  Continue antibiotic treatment for 7 days with continued local care elevation and monitoring and local care of the scabbed wound on the medial aspect of the midforearm.      HTNCAD/PAD/New Onset Afib (PAF)/Chronic Diastolic CHF  - BP acceptable, no anginal symptoms, in NSR  - continue on current regimen  - heparin gtt, hold on eliquis until we know for sure he does not need a tunnel cath     Type 2 DM  - had hypoglycemia, improved off of lantus  - continue D5W, getting thiamine also  - continue coverage with ssi/hypoglycemia protocol     AMENA  -Nephrology on board, follow management recommendations.  -Initiated on dialysis, mental status improving after dialysis management.     Hypoxia  - CXR noted, improved after additional dose of bumex  - monitor and encourage pulmonary toilet as able     Delirium/Toxic Encephalopathy  - continue on scheduled and PRN zyprexa  - he is current with palliatus at home and takes PRN tramadol, this has been restarted     Hypokalemia  - replace K+     BP is good holding midodrine  Still on oxygen (he has oxygen at night at home)  Still on heparin and change to eliquis when we have final plans about dialysis  Creatinine is better    Que Shah MD  06/20/24  09:46 EDT

## 2024-06-20 NOTE — PLAN OF CARE
Goal Outcome Evaluation:      Pt A&OX 4.Speech was relevant and not disorganized.  Was able to turn and shift weight by self in bed. No acute distress noted.

## 2024-06-20 NOTE — PROGRESS NOTES
PROGRESS NOTE      Patient Name: Dilip Verma  : 1950  MRN: [id number]  Primary Care Physician: Fernando Camargo DO  Date of admission: 2024    Patient Care Team:  Fernando Camargo DO as PCP - General (Family Medicine)  Morris Cai MD as Consulting Physician (Sleep Medicine)  Michaela Hylton MD as Consulting Physician (Cardiology)  Hardy Banerjee MD as Consulting Physician (Ophthalmology)  Chula Christianson MD as Consulting Physician (Ophthalmology)  Jason Sherman MD (Endocrinology)  Perla Mayen MD as Consulting Physician (Nephrology)  Federico Hebert MD as Consulting Physician (Pulmonary Disease)  Niraj Ravi MD as Consulting Physician (Orthopedic Surgery)  Papa Velasco MD as Consulting Physician (Urology)  Terese Yost MD as Consulting Physician (Gastroenterology)  Aracelis Ball MD as Consulting Physician (Colon and Rectal Surgery)  [facility] [place] as Primary Care Provider        Reason for Follow up:     AMENA, CKD III      Subjective:     Patient seen and examined, more awake, still  confused  S/p dialysis yesterday    Review of Systems:         Constitutional: weakness.  HEENT:  No headache, otalgia, itchy eyes, nasal discharge or sore throat.  Cardiac:  No chest pain, dyspnea, orthopnea or PND.  Chest:  No cough, phlegm or wheezing.  Abdomen:  No abdominal pain, nausea or vomiting.  Neuro:  No focal weakness, abnormal movements or seizure-like activity.  :   No hematuria, no pyuria, no dysuria, no flank pain.  Extremities:  No  joint pains.  ROS was otherwise negative except as mentioned in the [place].    Social History:  reports that he quit smoking about 24 years ago. His smoking use included cigarettes. He started smoking about 40 years ago. He has a 12 pack-year smoking history. He has been exposed to tobacco smoke. He has never used smokeless tobacco. He reports that he does not currently use alcohol. He reports that he does not  use drugs.    Medications:  Prior to Admission medications    Medication Sig Start Date End Date Taking? Authorizing Provider   aspirin 81 MG EC tablet Take 1 tablet by mouth Every Night.   Yes Heri Rinaldi MD   brimonidine (ALPHAGAN) 0.2 % ophthalmic solution Administer 1 drop to both eyes 2 (Two) Times a Day.   Yes Heri Rinaldi MD   bumetanide (BUMEX) 1 MG tablet Take 1 tablet by mouth Daily.   Yes Heri Rinaldi MD   Cholecalciferol (Vitamin D-3) 125 MCG (5000 UT) tablet Take 1 tablet by mouth Daily.   Yes Heri Rinaldi MD   DULoxetine (CYMBALTA) 30 MG capsule Take 1 capsule by mouth Every Night. 1/16/24  Yes Heri Rinaldi MD   Insulin Glargine, 2 Unit Dial, (TOUJEO) 300 UNIT/ML solution pen-injector injection Inject 24 Units under the skin into the appropriate area as directed Daily.   Yes Heri Rinaldi MD   insulin lispro (humaLOG) 100 UNIT/ML injection Inject 0-14 Units under the skin into the appropriate area as directed 3 (Three) Times a Day Before Meals.  Patient taking differently: Inject 0-14 Units under the skin into the appropriate area as directed 3 (Three) Times a Day Before Meals. SLIDING SCALE  100-150 - 4 units  151-200 - 5 units  201-250 - 6 units  251-300 - 7 units  301-350 - 8 units  351-400 - 9 units  401+ - 10 units 12/7/20  Yes Amador Valverde MD   latanoprost (XALATAN) 0.005 % ophthalmic solution Administer 1 drop to both eyes every night at bedtime. 1/16/23  Yes Heri Rinaldi MD   multivitamin with minerals (MULTIVITAMIN ADULTS PO) Take 1 tablet by mouth Daily.   Yes Heri Rinaldi MD   testosterone (ANDROGEL) 25 MG/2.5GM (1%) gel gel Place 25 mg on the skin as directed by provider 2 (Two) Times a Week.   Yes Heri Rinaldi MD   traMADol (ULTRAM) 50 MG tablet Take 1 tablet by mouth At Night As Needed. 10/16/23  Yes Heri Rinaldi MD   Magnesium 400 MG capsule Take 400 mg by mouth As Needed.    [place]  MD Heri   sildenafil (REVATIO) 20 MG tablet Take 1 tablet by mouth As Needed.    Provider, MD Heri     Scheduled Meds:amiodarone, 200 mg, Oral, Q24H  [Held by provider] apixaban, 5 mg, Oral, Q12H  aspirin, 81 mg, Oral, Daily  brimonidine, 1 drop, Both Eyes, BID  DULoxetine, 30 mg, Oral, Daily  guaiFENesin, 600 mg, Oral, Q12H  insulin lispro, 2-9 Units, Subcutaneous, 4x Daily AC & at Bedtime  ipratropium-albuterol, 3 mL, Nebulization, 4x Daily - RT  latanoprost, 1 drop, Both Eyes, Nightly  Menthol-Zinc Oxide, 1 Application, Topical, Q12H  metoprolol tartrate, 12.5 mg, Oral, Q12H  OLANZapine, 5 mg, Oral, Nightly  polyethylene glycol, 17 g, Oral, Daily  senna-docusate sodium, 2 tablet, Oral, BID  sodium bicarbonate, 650 mg, Oral, TID  sodium chloride, 10 mL, Intravenous, Q12H  sodium chloride, 10 mL, Intravenous, Q12H  sodium chloride, 4 mL, Nebulization, BID - RT  terazosin, 1 mg, Oral, Nightly  [START ON 6/21/2024] thiamine, 100 mg, Oral, Daily      Continuous Infusions:heparin, 10.4 Units/kg/hr, Last Rate: 16.4 Units/kg/hr (06/20/24 0239)          PRN Meds:  acetaminophen **OR** acetaminophen **OR** acetaminophen    senna-docusate sodium **AND** polyethylene glycol **AND** bisacodyl **AND** bisacodyl    dextrose    dextrose    glucagon (human recombinant)    heparin (porcine)    midodrine    nitroglycerin    OLANZapine    ondansetron    sodium chloride    sodium chloride    sodium chloride    sodium chloride    traMADol  Allergies:    Allergies   Allergen Reactions    Ace Inhibitors Angioedema    Angiotensin Receptor Blockers Angioedema and Unknown (See Comments)     Angioneurotic edema    Dapagliflozin Other (See Comments)     UTI,  rectal abcess    Losartan Angioedema     Angioneurotic edema    Seroquel [Quetiapine] Hallucinations    Xanax [Alprazolam] Unknown - High Severity    Amlodipine Swelling    Ativan [Lorazepam] Hallucinations    Baclofen Hallucinations    Misc. Sulfonamide Containing  "Compounds Unknown (See Comments)    Minoxidil Confusion    Tetracycline Rash     bliisters in mouth         Objective   Exam:     Vital Signs  Temp:  [97.9 °F (36.6 °C)-98.4 °F (36.9 °C)] 98.2 °F (36.8 °C)  Heart Rate:  [57-66] 66  Resp:  [18-22] 20  BP: (131-177)/(57-82) 162/76  SpO2:  [90 %-97 %] 90 %  on  Flow (L/min):  [2] 2;   Device (Oxygen Therapy): nasal cannula;humidified  Body mass index is 32.15 kg/m².       Exam:     /76 (BP Location: Right arm, Patient Position: Lying)   Pulse 66   Temp 98.2 °F (36.8 °C) (Oral)   Resp 20   Ht 172.7 cm (68\")   Wt 95.9 kg (211 lb 6.7 oz)   SpO2 90%   BMI 32.15 kg/m²     General Appearance:    Appears chronically ill, no distress   Head:    Normocephalic, atraumatic   Eyes:     EOM's intact, sclerae anicteric        Ears:    TMs not observed   Nose:   Patent without discharge   Neck:   Supple, JVD   Lungs:     Clear to auscultation bilaterally, respiratory effort is normal   Chest wall:    No tenderness   Heart:    Regular rate and rhythm, S1 and S2 normal, no   rub    or gallop   Abdomen:     Soft, nontender, nondistended,  no masses, no organomegaly   Extremities:   No edema   Neurologic:  No focal deficits.  Speech is fluent.  Conversation is coherent.      Results Review:  I have personally reviewed most recent Data :  [facility]  CBC    Results from last 7 days   Lab Units 06/18/24  0639 06/17/24  0600 06/16/24  0546 06/15/24  0703 06/14/24  0655   WBC 10*3/mm3 6.79 7.06 7.97 7.52 8.80   HEMOGLOBIN g/dL 10.5* 10.1* 10.4* 10.7* 10.9*   PLATELETS 10*3/mm3 256 250 263 268 243     CMP   Results from last 7 days   Lab Units 06/20/24  0822 06/19/24  0753 06/18/24  0639 06/17/24  0559 06/16/24  0546 06/15/24  0703 06/14/24  0655   SODIUM mmol/L 139 139 138 137 136 136 132*   POTASSIUM mmol/L 4.4 4.2 4.4 4.5 4.4 4.1 4.2   CHLORIDE mmol/L 104 105 105 105 101 100 98   CO2 mmol/L 27.0 24.0 20.6* 22.0 20.5* 20.9* 20.2*   BUN mg/dL 38* 35* 27* 53* " 44* 66* 54*   CREATININE mg/dL 3.36* 3.64* 3.46* 5.23* 4.81* 6.93* 5.83*   GLUCOSE mg/dL 195* 135* 111* 141* 159* 143* 168*     ABG    Results from last 7 days   Lab Units 06/15/24  1613   PH, ARTERIAL pH units 7.296*   PCO2, ARTERIAL mm Hg 48.6*   PO2 ART mm Hg 62.1*   O2 SATURATION ART % 88.5*   BASE EXCESS ART mmol/L -3.0*       XR Chest Post CVA Port    Result Date: 6/15/2024  1. Since yesterday's portable chest x-ray on 6/14/2024 at 2:15 p.m., there is been interval placement of a right internal jugular central line-Shiley catheter and its distal tip projects over the superior vena cava in good position and no pneumothorax is seen. Otherwise, there has been no change when compared to yesterday's chest x-ray 6/14/2024 at 2:15 p.m.  2. The patient's had previous median sternotomy there is prominent blunting of the left lateral costophrenic angle and some hazy density over the inferior left hemithorax likely secondary to at least a small to moderate size up to moderate size left pleural effusion there is some dense airspace consolidation in the left lower lung zone left lung base likely compressive atelectasis associated with the left pleural effusion although I cannot exclude superimposed pneumonia at the left lower lobe or lung base and correlate clinically. No additional active disease is seen in the chest.  This report was finalized on 6/15/2024 5:23 PM by Dr. Jaquan Mendieta M.D on Workstation: [id number]      XR Chest 1 View    Result Date: 6/14/2024  As described.    This report was finalized on 6/14/2024 2:42 PM by Dr. Gabe Zimmer M.D on Workstation: [id number]       Results for orders placed during the hospital encounter of 05/30/24    Adult Transthoracic Echo Complete W/ Cont if Necessary Per Protocol    Interpretation Summary    Left ventricular systolic function is hyperdynamic (EF > 70%). Calculated left ventricular EF = 75% Global longitudinal LV strain (GLS) = -17.8%. Left ventricle strain data  was reviewed by the physician and found to be accurate. Global longitudinal LV strain is normal however there is regional abnormality with apical sparing suggestive of amyloid heart disease. However there is also basal ptal hypertrophy suggestive of hypertrophic cardiomyopathy. Wall motion abnormality is also noted in the basal septum and cannot rule out ischemic component.Consider additional imaging such as cardiac MRI and further evaluation also for amyloid heart disease as clinically indicated. The left ventricular cavity is small in size. Left ventricular wall thickness is consistent with moderate to severe septal asymmetric hypertrophy. There is hypokinesis of the left ventricular basal septum. Left ventricular diastolic function is consistent with (grade II w/high LAP) pseudonormalization.    The left atrial cavity is mildly dilated.    No aortic valve regurgitation or stenosis is present. The aortic valve is abnormal in structure. There is mild thickening of the aortic valve.    There is mild, bileaflet mitral valve thickening present. Trace mitral valve regurgitation is present. No significant mitral valve stenosis is present.    Moderate tricuspid valve regurgitation is present. Estimated right ventricular systolic pressure from tricuspid regurgitation is markedly elevated (>55 mmHg). Calculated right ventricular systolic pressure from tricuspid regurgitation is 74 mmHg.        Assessment & Plan   Assessment and Plan:         Acute UTI (urinary tract infection)    Type 2 diabetes mellitus with hyperglycemia, with long-term current use of insulin    CAD (coronary artery disease)    Hx of CABG    Chronic diastolic heart failure    CSA (central sleep apnea)    HTN (hypertension)    History of stroke    CKD (chronic kidney disease) stage 3, GFR 30-59 ml/min    Peripheral arterial disease    Sepsis    Hyperkalemia    Metabolic encephalopathy    AMENA (acute kidney injury)    Paroxysmal atrial  fibrillation    ASSESSMENT:  AMENA likely prerenal ATN 2/2 urosepsis with hemodynamic changes; on CKD III etiology likely diabetic and hypertensive nephropathy with baseline sCr ~0.9-1.2 mg/dL  Hyperkalemia, now resolved  UTI, urine cultures with gram neg bacilli  Leukocytosis  Metabolic encephalopathy  PAD  Hx stroke  HTN  CHF  CAD  DM2  Chronic splenic vein thrombosis  Possible hepatocellular disease/cirrhosis  Likely benign subpleural nodule with bilateral lymphadenopathy  Hiatal hernia    Last TTE 10/5/22 with EF 66%, grade II DD    PLAN :     AMENA likely prerenal ATN 2/2 urosepsis with hemodynamic changes; on CKD III etiology likely diabetic and hypertensive nephropathy with baseline sCr ~0.9-1.2 mg/dL  Initiated on dialysis yesterday 6/15 due to worsening renal function.   Patient is more alert oriented and clinically patient getting better  Patient creatinine slightly better than yesterday   Improvement in creatinine since yesterday and with good urine output I think ATN is getting better   At this time we will discontinue dialysis catheter tomorrow morning   Okay to be discharged when okay with primary and infectious disease   Discontinue [facility] catheter  More hemodialysis today  No need for Bumex at this time  Cardiology following for new onset A-fib, evaluation noted  Avoid NSAIDs, nephrotoxic agents.   We will follow and coordinate with team  Monitor closely for renal recovery

## 2024-06-21 ENCOUNTER — APPOINTMENT (OUTPATIENT)
Dept: GENERAL RADIOLOGY | Facility: HOSPITAL | Age: 74
End: 2024-06-21
Payer: MEDICARE

## 2024-06-21 LAB
ANION GAP SERPL CALCULATED.3IONS-SCNC: 8 MMOL/L (ref 5–15)
APTT PPP: 78.9 SECONDS (ref 22.7–35.4)
BACTERIA UR QL AUTO: ABNORMAL /HPF
BILIRUB UR QL STRIP: NEGATIVE
BUN SERPL-MCNC: 40 MG/DL (ref 8–23)
BUN/CREAT SERPL: 11.6 (ref 7–25)
CALCIUM SPEC-SCNC: 8.5 MG/DL (ref 8.6–10.5)
CHLORIDE SERPL-SCNC: 106 MMOL/L (ref 98–107)
CLARITY UR: ABNORMAL
CO2 SERPL-SCNC: 28 MMOL/L (ref 22–29)
COLOR UR: YELLOW
CREAT SERPL-MCNC: 3.45 MG/DL (ref 0.76–1.27)
EGFRCR SERPLBLD CKD-EPI 2021: 17.8 ML/MIN/1.73
GLUCOSE BLDC GLUCOMTR-MCNC: 169 MG/DL (ref 70–130)
GLUCOSE BLDC GLUCOMTR-MCNC: 196 MG/DL (ref 70–130)
GLUCOSE BLDC GLUCOMTR-MCNC: 199 MG/DL (ref 70–130)
GLUCOSE SERPL-MCNC: 170 MG/DL (ref 65–99)
GLUCOSE UR STRIP-MCNC: NEGATIVE MG/DL
HGB UR QL STRIP.AUTO: ABNORMAL
HYALINE CASTS UR QL AUTO: ABNORMAL /LPF
KETONES UR QL STRIP: NEGATIVE
LEUKOCYTE ESTERASE UR QL STRIP.AUTO: ABNORMAL
NITRITE UR QL STRIP: NEGATIVE
PH UR STRIP.AUTO: <=5 [PH] (ref 5–8)
POTASSIUM SERPL-SCNC: 4.7 MMOL/L (ref 3.5–5.2)
PROT UR QL STRIP: ABNORMAL
RBC # UR STRIP: ABNORMAL /HPF
REF LAB TEST METHOD: ABNORMAL
SODIUM SERPL-SCNC: 142 MMOL/L (ref 136–145)
SP GR UR STRIP: 1.01 (ref 1–1.03)
SQUAMOUS #/AREA URNS HPF: ABNORMAL /HPF
UROBILINOGEN UR QL STRIP: ABNORMAL
WBC # UR STRIP: ABNORMAL /HPF
YEAST URNS QL MICRO: ABNORMAL /HPF

## 2024-06-21 PROCEDURE — 94799 UNLISTED PULMONARY SVC/PX: CPT

## 2024-06-21 PROCEDURE — 63710000001 INSULIN LISPRO (HUMAN) PER 5 UNITS: Performed by: INTERNAL MEDICINE

## 2024-06-21 PROCEDURE — 25010000002 HEPARIN (PORCINE) 25000-0.45 UT/250ML-% SOLUTION: Performed by: INTERNAL MEDICINE

## 2024-06-21 PROCEDURE — 94760 N-INVAS EAR/PLS OXIMETRY 1: CPT

## 2024-06-21 PROCEDURE — 97530 THERAPEUTIC ACTIVITIES: CPT

## 2024-06-21 PROCEDURE — 97535 SELF CARE MNGMENT TRAINING: CPT

## 2024-06-21 PROCEDURE — 94664 DEMO&/EVAL PT USE INHALER: CPT

## 2024-06-21 PROCEDURE — 94761 N-INVAS EAR/PLS OXIMETRY MLT: CPT

## 2024-06-21 PROCEDURE — 82948 REAGENT STRIP/BLOOD GLUCOSE: CPT

## 2024-06-21 PROCEDURE — 71046 X-RAY EXAM CHEST 2 VIEWS: CPT

## 2024-06-21 PROCEDURE — 25010000002 BUMETANIDE PER 0.5 MG: Performed by: INTERNAL MEDICINE

## 2024-06-21 PROCEDURE — 81001 URINALYSIS AUTO W/SCOPE: CPT | Performed by: HOSPITALIST

## 2024-06-21 PROCEDURE — 80048 BASIC METABOLIC PNL TOTAL CA: CPT

## 2024-06-21 PROCEDURE — 87086 URINE CULTURE/COLONY COUNT: CPT | Performed by: HOSPITALIST

## 2024-06-21 PROCEDURE — 85730 THROMBOPLASTIN TIME PARTIAL: CPT | Performed by: INTERNAL MEDICINE

## 2024-06-21 RX ORDER — BUMETANIDE 1 MG/1
1 TABLET ORAL
Status: DISCONTINUED | OUTPATIENT
Start: 2024-06-21 | End: 2024-06-25 | Stop reason: HOSPADM

## 2024-06-21 RX ORDER — BUMETANIDE 0.25 MG/ML
1 INJECTION INTRAMUSCULAR; INTRAVENOUS ONCE
Status: COMPLETED | OUTPATIENT
Start: 2024-06-21 | End: 2024-06-21

## 2024-06-21 RX ADMIN — Medication 4 ML: at 19:17

## 2024-06-21 RX ADMIN — SENNOSIDES AND DOCUSATE SODIUM 2 TABLET: 50; 8.6 TABLET ORAL at 08:48

## 2024-06-21 RX ADMIN — BRIMONIDINE TARTRATE 1 DROP: 2 SOLUTION OPHTHALMIC at 21:15

## 2024-06-21 RX ADMIN — OLANZAPINE 5 MG: 5 TABLET, FILM COATED ORAL at 21:15

## 2024-06-21 RX ADMIN — Medication 10 ML: at 21:17

## 2024-06-21 RX ADMIN — Medication 100 MG: at 08:48

## 2024-06-21 RX ADMIN — TRAMADOL HYDROCHLORIDE 50 MG: 50 TABLET ORAL at 21:15

## 2024-06-21 RX ADMIN — AMIODARONE HYDROCHLORIDE 200 MG: 200 TABLET ORAL at 08:48

## 2024-06-21 RX ADMIN — INSULIN LISPRO 2 UNITS: 100 INJECTION, SOLUTION INTRAVENOUS; SUBCUTANEOUS at 13:16

## 2024-06-21 RX ADMIN — GUAIFENESIN 600 MG: 600 TABLET, EXTENDED RELEASE ORAL at 08:48

## 2024-06-21 RX ADMIN — Medication 4 ML: at 07:00

## 2024-06-21 RX ADMIN — IPRATROPIUM BROMIDE AND ALBUTEROL SULFATE 3 ML: .5; 3 SOLUTION RESPIRATORY (INHALATION) at 16:02

## 2024-06-21 RX ADMIN — IPRATROPIUM BROMIDE AND ALBUTEROL SULFATE 3 ML: .5; 3 SOLUTION RESPIRATORY (INHALATION) at 06:58

## 2024-06-21 RX ADMIN — INSULIN LISPRO 2 UNITS: 100 INJECTION, SOLUTION INTRAVENOUS; SUBCUTANEOUS at 08:47

## 2024-06-21 RX ADMIN — POLYETHYLENE GLYCOL 3350 17 G: 17 POWDER, FOR SOLUTION ORAL at 08:47

## 2024-06-21 RX ADMIN — Medication 1 APPLICATION: at 21:15

## 2024-06-21 RX ADMIN — IPRATROPIUM BROMIDE AND ALBUTEROL SULFATE 3 ML: .5; 3 SOLUTION RESPIRATORY (INHALATION) at 19:17

## 2024-06-21 RX ADMIN — IPRATROPIUM BROMIDE AND ALBUTEROL SULFATE 3 ML: .5; 3 SOLUTION RESPIRATORY (INHALATION) at 11:04

## 2024-06-21 RX ADMIN — BRIMONIDINE TARTRATE 1 DROP: 2 SOLUTION OPHTHALMIC at 08:48

## 2024-06-21 RX ADMIN — LATANOPROST 1 DROP: 50 SOLUTION OPHTHALMIC at 21:16

## 2024-06-21 RX ADMIN — GUAIFENESIN 600 MG: 600 TABLET, EXTENDED RELEASE ORAL at 21:15

## 2024-06-21 RX ADMIN — METOPROLOL TARTRATE 12.5 MG: 25 TABLET, FILM COATED ORAL at 21:15

## 2024-06-21 RX ADMIN — Medication 1 APPLICATION: at 08:47

## 2024-06-21 RX ADMIN — ASPIRIN 81 MG: 81 TABLET, CHEWABLE ORAL at 08:48

## 2024-06-21 RX ADMIN — SODIUM BICARBONATE 650 MG: 650 TABLET, ORALLY DISINTEGRATING ORAL at 08:48

## 2024-06-21 RX ADMIN — BUMETANIDE 1 MG: 1 TABLET ORAL at 21:15

## 2024-06-21 RX ADMIN — DULOXETINE HYDROCHLORIDE 30 MG: 30 CAPSULE, DELAYED RELEASE ORAL at 21:15

## 2024-06-21 RX ADMIN — HEPARIN SODIUM 16.4 UNITS/KG/HR: 10000 INJECTION, SOLUTION INTRAVENOUS at 14:53

## 2024-06-21 RX ADMIN — BUMETANIDE 1 MG: 0.25 INJECTION INTRAMUSCULAR; INTRAVENOUS at 14:53

## 2024-06-21 RX ADMIN — TERAZOSIN HYDROCHLORIDE 1 MG: 1 CAPSULE ORAL at 21:15

## 2024-06-21 RX ADMIN — METOPROLOL TARTRATE 12.5 MG: 25 TABLET, FILM COATED ORAL at 08:49

## 2024-06-21 NOTE — PROGRESS NOTES
PROGRESS NOTE      Patient Name: Dilip Verma  : 1950  MRN: [id number]  Primary Care Physician: Fernando Camargo DO  Date of admission: 2024    Patient Care Team:  Fernando Camargo DO as PCP - General (Family Medicine)  Morris Cai MD as Consulting Physician (Sleep Medicine)  Michaela Hylton MD as Consulting Physician (Cardiology)  Hardy Banerjee MD as Consulting Physician (Ophthalmology)  Chula Christianson MD as Consulting Physician (Ophthalmology)  Jason Sherman MD (Endocrinology)  Perla Mayen MD as Consulting Physician (Nephrology)  Federico Hebert MD as Consulting Physician (Pulmonary Disease)  Niraj Ravi MD as Consulting Physician (Orthopedic Surgery)  Papa Velasco MD as Consulting Physician (Urology)  Terese Yost MD as Consulting Physician (Gastroenterology)  Aracelis Ball MD as Consulting Physician (Colon and Rectal Surgery)  [facility] as Primary Care Provider        Reason for Follow up:     AMENA, CKD III      Subjective:     Patient seen and examined, more awake, still  confused  S/p dialysis yesterday    Review of Systems:         Constitutional: weakness.  HEENT:  No headache, otalgia, itchy eyes, nasal discharge or sore throat.  Cardiac:  No chest pain, dyspnea, orthopnea or PND.  Chest:  No cough, phlegm or wheezing.  Abdomen:  No abdominal pain, nausea or vomiting.  Neuro:  No focal weakness, abnormal movements or seizure-like activity.  :   No hematuria, no pyuria, no dysuria, no flank pain.  Extremities:  No  joint pains.  ROS was otherwise negative except as mentioned in the [place].    Social History:  reports that he quit smoking about 24 years ago. His smoking use included cigarettes. He started smoking about 40 years ago. He has a 12 pack-year smoking history. He has been exposed to tobacco smoke. He has never used smokeless tobacco. He reports that he does not currently use alcohol. He reports that he does not  use drugs.    Medications:  Prior to Admission medications    Medication Sig Start Date End Date Taking? Authorizing Provider   aspirin 81 MG EC tablet Take 1 tablet by mouth Every Night.   Yes Heri Rinaldi MD   brimonidine (ALPHAGAN) 0.2 % ophthalmic solution Administer 1 drop to both eyes 2 (Two) Times a Day.   Yes Heri Rinaldi MD   bumetanide (BUMEX) 1 MG tablet Take 1 tablet by mouth Daily.   Yes Heri Rinaldi MD   Cholecalciferol (Vitamin D-3) 125 MCG (5000 UT) tablet Take 1 tablet by mouth Daily.   Yes Heri Rinaldi MD   DULoxetine (CYMBALTA) 30 MG capsule Take 1 capsule by mouth Every Night. 1/16/24  Yes Heri Rinaldi MD   Insulin Glargine, 2 Unit Dial, (TOUJEO) 300 UNIT/ML solution pen-injector injection Inject 24 Units under the skin into the appropriate area as directed Daily.   Yes Heri Rinaldi MD   insulin lispro (humaLOG) 100 UNIT/ML injection Inject 0-14 Units under the skin into the appropriate area as directed 3 (Three) Times a Day Before Meals.  Patient taking differently: Inject 0-14 Units under the skin into the appropriate area as directed 3 (Three) Times a Day Before Meals. SLIDING SCALE  100-150 - 4 units  151-200 - 5 units  201-250 - 6 units  251-300 - 7 units  301-350 - 8 units  351-400 - 9 units  401+ - 10 units 12/7/20  Yes Amador Valverde MD   latanoprost (XALATAN) 0.005 % ophthalmic solution Administer 1 drop to both eyes every night at bedtime. 1/16/23  Yes Heri Rinaldi MD   multivitamin with minerals (MULTIVITAMIN ADULTS PO) Take 1 tablet by mouth Daily.   Yes Heri Rinaldi MD   testosterone (ANDROGEL) 25 MG/2.5GM (1%) gel gel Place 25 mg on the skin as directed by provider 2 (Two) Times a Week.   Yes Heri Rinaldi MD   traMADol (ULTRAM) 50 MG tablet Take 1 tablet by mouth At Night As Needed. 10/16/23  Yes Heri Rinaldi MD   Magnesium 400 MG capsule Take 400 mg by mouth As Needed.    [place]  MD Heri   sildenafil (REVATIO) 20 MG tablet Take 1 tablet by mouth As Needed.    Provider, MD Heri     Scheduled Meds:amiodarone, 200 mg, Oral, Q24H  [Held by provider] apixaban, 5 mg, Oral, Q12H  aspirin, 81 mg, Oral, Daily  brimonidine, 1 drop, Both Eyes, BID  bumetanide, 1 mg, Oral, BID  DULoxetine, 30 mg, Oral, Daily  guaiFENesin, 600 mg, Oral, Q12H  insulin lispro, 2-9 Units, Subcutaneous, 4x Daily AC & at Bedtime  ipratropium-albuterol, 3 mL, Nebulization, 4x Daily - RT  latanoprost, 1 drop, Both Eyes, Nightly  Menthol-Zinc Oxide, 1 Application, Topical, Q12H  metoprolol tartrate, 12.5 mg, Oral, Q12H  OLANZapine, 5 mg, Oral, Nightly  polyethylene glycol, 17 g, Oral, Daily  senna-docusate sodium, 2 tablet, Oral, BID  sodium chloride, 10 mL, Intravenous, Q12H  sodium chloride, 10 mL, Intravenous, Q12H  sodium chloride, 4 mL, Nebulization, BID - RT  terazosin, 1 mg, Oral, Nightly  thiamine, 100 mg, Oral, Daily      Continuous Infusions:heparin, 10.4 Units/kg/hr, Last Rate: 16.4 Units/kg/hr (06/21/24 1453)          PRN Meds:  acetaminophen **OR** acetaminophen **OR** acetaminophen    senna-docusate sodium **AND** polyethylene glycol **AND** bisacodyl **AND** bisacodyl    dextrose    dextrose    glucagon (human recombinant)    heparin (porcine)    midodrine    nitroglycerin    OLANZapine    ondansetron    sodium chloride    sodium chloride    sodium chloride    sodium chloride    traMADol  Allergies:    Allergies   Allergen Reactions    Ace Inhibitors Angioedema    Angiotensin Receptor Blockers Angioedema and Unknown (See Comments)     Angioneurotic edema    Dapagliflozin Other (See Comments)     UTI,  rectal abcess    Losartan Angioedema     Angioneurotic edema    Seroquel [Quetiapine] Hallucinations    Xanax [Alprazolam] Unknown - High Severity    Amlodipine Swelling    Ativan [Lorazepam] Hallucinations    Baclofen Hallucinations    Misc. Sulfonamide Containing Compounds Unknown (See Comments)     "Minoxidil Confusion    Tetracycline Rash     bliisters in mouth         Objective   Exam:     Vital Signs  Temp:  [98.4 °F (36.9 °C)-98.6 °F (37 °C)] 98.6 °F (37 °C)  Heart Rate:  [62-67] 67  Resp:  [15-20] 16  BP: (131-154)/(62-74) 131/62  SpO2:  [93 %-97 %] 94 %  on  Flow (L/min):  [4] 4;   Device (Oxygen Therapy): nasal cannula;humidified  Body mass index is 32.21 kg/m².       Exam:     /62 (BP Location: Left arm, Patient Position: Lying)   Pulse 67   Temp 98.6 °F (37 °C) (Oral)   Resp 16   Ht 172.7 cm (68\")   Wt 96.1 kg (211 lb 13.8 oz)   SpO2 94%   BMI 32.21 kg/m²     General Appearance:    Appears chronically ill, no distress   Head:    Normocephalic, atraumatic   Eyes:     EOM's intact, sclerae anicteric        Ears:    TMs not observed   Nose:   Patent without discharge   Neck:   Supple, JVD   Lungs:     Clear to auscultation bilaterally, respiratory effort is normal   Chest wall:    No tenderness   Heart:    Regular rate and rhythm, S1 and S2 normal, no   rub    or gallop   Abdomen:     Soft, nontender, nondistended,  no masses, no organomegaly   Extremities:   No edema   Neurologic:  No focal deficits.  Speech is fluent.  Conversation is coherent.      Results Review:  I have personally reviewed most recent Data :  [facility]  CBC    Results from last 7 days   Lab Units 06/18/24  0639 06/17/24  0600 06/16/24  0546 06/15/24  0703   WBC 10*3/mm3 6.79 7.06 7.97 7.52   HEMOGLOBIN g/dL 10.5* 10.1* 10.4* 10.7*   PLATELETS 10*3/mm3 256 250 263 268     CMP   Results from last 7 days   Lab Units 06/21/24  0732 06/20/24  0822 06/19/24  0753 06/18/24  0639 06/17/24  0559 06/16/24  0546 06/15/24  0703   SODIUM mmol/L 142 139 139 138 137 136 136   POTASSIUM mmol/L 4.7 4.4 4.2 4.4 4.5 4.4 4.1   CHLORIDE mmol/L 106 104 105 105 105 101 100   CO2 mmol/L 28.0 27.0 24.0 20.6* 22.0 20.5* 20.9*   BUN mg/dL 40* 38* 35* 27* 53* 44* 66*   CREATININE mg/dL 3.45* 3.36* 3.64* 3.46* 5.23* 4.81* 6.93* "   GLUCOSE mg/dL 170* 195* 135* 111* 141* 159* 143*     ABG    Results from last 7 days   Lab Units 06/15/24  1613   PH, ARTERIAL pH units 7.296*   PCO2, ARTERIAL mm Hg 48.6*   PO2 ART mm Hg 62.1*   O2 SATURATION ART % 88.5*   BASE EXCESS ART mmol/L -3.0*       XR Chest PA & Lateral    Result Date: 6/21/2024   1. Stable right IJ central venous catheter. 2. Low lung volumes with similar-appearing left greater than right bilateral perihilar and bibasilar opacities that could represent atelectasis and/or pneumonia with possible small pleural effusions and possible superimposed mild pulmonary vascular congestion/pulmonary edema.  This report was finalized on 6/21/2024 1:05 PM by Julio Thomas MD on Workstation: [id number]      XR Chest Post CVA Port    Result Date: 6/15/2024  1. Since yesterday's portable chest x-ray on 6/14/2024 at 2:15 p.m., there is been interval placement of a right internal jugular central line-Shiley catheter and its distal tip projects over the superior vena cava in good position and no pneumothorax is seen. Otherwise, there has been no change when compared to yesterday's chest x-ray 6/14/2024 at 2:15 p.m.  2. The patient's had previous median sternotomy there is prominent blunting of the left lateral costophrenic angle and some hazy density over the inferior left hemithorax likely secondary to at least a small to moderate size up to moderate size left pleural effusion there is some dense airspace consolidation in the left lower lung zone left lung base likely compressive atelectasis associated with the left pleural effusion although I cannot exclude superimposed pneumonia at the left lower lobe or lung base and correlate clinically. No additional active disease is seen in the chest.  This report was finalized on 6/15/2024 5:23 PM by Dr. Jaquan Mendieta M.D on Workstation: [id number]      XR Chest 1 View    Result Date: 6/14/2024  As described.    This report was finalized on 6/14/2024 2:42 PM by  Dr. Gabe Zimmer M.D on Workstation: [id number]       Results for orders placed during the hospital encounter of 05/30/24    Adult Transthoracic Echo Complete W/ Cont if Necessary Per Protocol    Interpretation Summary    Left ventricular systolic function is hyperdynamic (EF > 70%). Calculated left ventricular EF = 75% Global longitudinal LV strain (GLS) = -17.8%. Left ventricle strain data was reviewed by the physician and found to be accurate. Global longitudinal LV strain is normal however there is regional abnormality with apical sparing suggestive of amyloid heart disease. However there is also basal ptal hypertrophy suggestive of hypertrophic cardiomyopathy. Wall motion abnormality is also noted in the basal septum and cannot rule out ischemic component.Consider additional imaging such as cardiac MRI and further evaluation also for amyloid heart disease as clinically indicated. The left ventricular cavity is small in size. Left ventricular wall thickness is consistent with moderate to severe septal asymmetric hypertrophy. There is hypokinesis of the left ventricular basal septum. Left ventricular diastolic function is consistent with (grade II w/high LAP) pseudonormalization.    The left atrial cavity is mildly dilated.    No aortic valve regurgitation or stenosis is present. The aortic valve is abnormal in structure. There is mild thickening of the aortic valve.    There is mild, bileaflet mitral valve thickening present. Trace mitral valve regurgitation is present. No significant mitral valve stenosis is present.    Moderate tricuspid valve regurgitation is present. Estimated right ventricular systolic pressure from tricuspid regurgitation is markedly elevated (>55 mmHg). Calculated right ventricular systolic pressure from tricuspid regurgitation is 74 mmHg.        Assessment & Plan   Assessment and Plan:         Acute UTI (urinary tract infection)    Type 2 diabetes mellitus with hyperglycemia, with  long-term current use of insulin    CAD (coronary artery disease)    Hx of CABG    Chronic diastolic heart failure    CSA (central sleep apnea)    HTN (hypertension)    History of stroke    CKD (chronic kidney disease) stage 3, GFR 30-59 ml/min    Peripheral arterial disease    Sepsis    Hyperkalemia    Metabolic encephalopathy    AMENA (acute kidney injury)    Paroxysmal atrial fibrillation    ASSESSMENT:  AMENA likely prerenal ATN 2/2 urosepsis with hemodynamic changes; on CKD III etiology likely diabetic and hypertensive nephropathy with baseline sCr ~0.9-1.2 mg/dL  Hyperkalemia, now resolved  UTI, urine cultures with gram neg bacilli  Leukocytosis  Metabolic encephalopathy  PAD  Hx stroke  HTN  CHF  CAD  DM2  Chronic splenic vein thrombosis  Possible hepatocellular disease/cirrhosis  Likely benign subpleural nodule with bilateral lymphadenopathy  Hiatal hernia    Last TTE 10/5/22 with EF 66%, grade II DD    PLAN :     AMENA likely prerenal ATN 2/2 urosepsis with hemodynamic changes; on CKD III etiology likely diabetic and hypertensive nephropathy with baseline sCr ~0.9-1.2 mg/dL  Initiated on dialysis yesterday 6/15 due to worsening renal function.   Patient is more alert oriented and clinically patient getting better  Patient creatinine slightly higher than yesterday  Paraproteinemia workup unremarkable  Likely related to some cardiorenal syndrome as echocardiogram consistent with possible amyloidosis with grade 2 diastolic dysfunctions  Improvement in creatinine since yesterday and with good urine output I think ATN is getting better   I was thinking to discontinue dialysis catheter because of some increasing creatinine and patient is still weak and lethargic I will keep the hemodialysis catheter another 24 hours  I think with extra diuretics with increasing edema and patient weight gain likely with more diuresis patient renal function will get better  Okay to be discharged when okay with primary and infectious  disease   Discontinue [facility] catheter  May need further workup and treatment for cardiac amyloidosis  Avoid NSAIDs, nephrotoxic agents.   We will follow and coordinate with team  Monitor closely for renal recovery

## 2024-06-21 NOTE — THERAPY TREATMENT NOTE
Patient Name: Dilip Verma  : 1950    MRN: [id number]                              Today's Date: 2024       Admit Date: 2024    Visit Dx:     ICD-10-CM ICD-9-CM   1. Acute respiratory failure with hypoxia  J96.01 518.81   2. Sepsis without acute organ dysfunction, due to unspecified organism  A41.9 038.9     995.91   3. Hyperkalemia  E87.5 276.7   4. Metabolic encephalopathy  G93.41 348.31   5. Acute UTI  N39.0 599.0   6. Hyperglycemia due to diabetes mellitus  E11.65 250.02   7. Chronic heart failure with preserved ejection fraction (HFpEF)  I50.32 428.9   8. Acute UTI (urinary tract infection)  N39.0 599.0     Patient Active Problem List   Diagnosis    Anxiety    Colon polyp    Type 2 diabetes mellitus with hyperglycemia, with long-term current use of insulin    Erectile dysfunction    Hyperlipidemia    Type 2 acute myocardial infarction    CAD (coronary artery disease)    Hx of CABG    Chronic diastolic heart failure    Hypersomnia due to medical condition    CSA (central sleep apnea)    Periodic breathing    HTN (hypertension)    History of stroke    CKD (chronic kidney disease) stage 3, GFR 30-59 ml/min    Urinary retention    Acute on chronic renal failure    Acute UTI (urinary tract infection)    Acute on chronic diastolic (congestive) heart failure    Bacteriuria    Rectal pain    Status post total replacement of hip    Vitamin D deficiency    Post-acute COVID-19 syndrome    Insulin dose changed    Arthropathy associated with neurological disorder    Personal history of colonic polyps    Type 2 diabetes mellitus with diabetic neuropathy, with long-term current use of insulin    Cervical spinal stenosis    Abscess of skin    Overweight    Cervical stenosis of spine    Spinal stenosis, lumbar region, without neurogenic claudication    Medically noncompliant    Chronic heart failure with preserved ejection fraction (HFpEF)    Carotid stenosis    Peripheral arterial disease    Sepsis     Hyperkalemia    Metabolic encephalopathy    AMENA (acute kidney injury)    Paroxysmal atrial fibrillation     Past Medical History:   Diagnosis Date    AMENA (acute kidney injury) 12/03/2020    Alcoholism 1989    not since 1998    CORI positive     Anemia     Anxiety     Ataxia     Atypical chest pain 04/19/2022    SEEN AT [facility] ER    Balance disorder     Carotid stenosis 3/28/2024    Cataract     BILATERAL, S/P EXTRACTION    Cervical radiculopathy 11/11/2019    SEEN AT  [facility] ER    Cervical spinal cord compression     Chronic diastolic (congestive) heart failure     Chronic kidney disease     STAGE 3, FOLLOWED BY DR. VIVAR    Chronic pancreatitis     Closed left subtrochanteric femur fracture 12/01/2020    ADMITTED TO [facility]    Closed nondisplaced intertrochanteric fracture of left femur 12/01/2020    ADMITTED TO [facility]    Colon polyps     FOLLOWED BY DR. AVTAR JEONG    Constipation     Contracture, right hand     Coronary artery disease     CABG 7/2019    COVID-19 07/2022    DDD (degenerative disc disease), cervical     Diabetes mellitus, type II     IDDM    Diabetic retinopathy     Difficulty walking     Dysphagia     Elevated brain natriuretic peptide (BNP) level 08/2014    Elevated LFTs 03/2021    Erectile dysfunction     Fissure, anal 2022    Foot drop     Fuchs' corneal dystrophy of right eye     Glaucoma     BILATERAL    History of alcohol abuse     Hyperlipidemia     Hypersomnia     Hypertension     Hypertensive urgency 02/25/2020    ADMITTED TO [facility]    Insomnia     Kidney stones     LV dysfunction 06/2016    Lyme disease     Lymphadenopathy syndrome 05/2021    Macular edema     BILATERAL    Myocardial infarction 11/05/2019    NSTEMI, ADMITTED TO [facility]    Myocardial infarction 07/12/2019    NSTEMI, ADMITTED TO [facility]    Neuropathy in diabetes     Non-celiac gluten sensitivity     Orthostasis     Osteoporosis     Oxygen dependent     PAD (peripheral artery disease)     Paroxysmal atrial fibrillation 6/6/2024    PNA (pneumonia)  "06/2016    LEFT LOBE    Polyneuropathy     PTSD (post-traumatic stress disorder)     Pulmonary hypertension     Pulmonary nodule     Senile ectropion of both lower eyelids 02/2022    Sepsis 12/16/2020    D/T UTI, ADMITTED TO [facility]    Sleep apnea     STATES DOESN'T USE BIPAP OR CPAP    Spinal stenosis in cervical region     SEVERE-LIMITED ROM    Stroke 2012    \"slight stroke\"    Syncope and collapse 07/06/2019    ADMITTED TO [place]    Urinary retention 04/05/2021    SEEN AT [facility] ER    Vision loss     Vitamin D deficiency      Past Surgical History:   Procedure Laterality Date    CARDIAC CATHETERIZATION N/A 07/15/2019    Procedure: Coronary angiography;  Surgeon: Carrie Price MD;  Location:  [facility];  Service: Cardiovascular    CARDIAC CATHETERIZATION N/A 07/15/2019    Procedure: Left Heart Cath;  Surgeon: Carrie Price MD;  Location:  [facility];  Service: Cardiovascular    CARDIAC CATHETERIZATION N/A 07/15/2019    Procedure: Left ventriculography;  Surgeon: Carrie Price MD;  Location:  [facility];  Service: Cardiovascular    CARDIAC CATHETERIZATION  07/15/2019    Procedure: Functional Flow [place];  Surgeon: Carrie Price MD;  Location:  [facility];  Service: Cardiovascular    CARDIAC CATHETERIZATION N/A 11/06/2019    Procedure: Right and Left Heart Cath;  Surgeon: Mya Smith MD;  Location:  [facility];  Service: Cardiovascular    CARDIAC CATHETERIZATION N/A 11/06/2019    Procedure: Coronary angiography;  Surgeon: Mya Smith MD;  Location:  [facility];  Service: Cardiovascular    CARDIAC SURGERY      CATARACT EXTRACTION Left 2014    CATARACT EXTRACTION Right 11/2016    PHACO/IOL, DR. LEN DENTON    COLONOSCOPY N/A 03/16/2023    ENTIRE COLON WNL, RESCOPE IN 5 YRS, DR. LINDA LIZARRAGA AT [facility]    COLONOSCOPY W/ POLYPECTOMY N/A 01/02/2015    A FEW DIVERTICULA IN SIGMOID, 6 MM TUBULOVILLOUS " ADENOMA POLYP IN RECTUM, SMALL HEMORRHOIDS, MELANOSIS COLI, DR. AVTAR JEONG AT [facility]    CORONARY ARTERY BYPASS GRAFT N/A 07/18/2019    Procedure: INTRAOPERATIVE SHOAIB; STERNOTOMY CORONARY ARTERY BYPASS x 3  USING LEFT INTERNAL MAMMARY ARTERY GRAFT UTILIZING ENDOSCOPICALLY HARVESTED RIGHT GREATER SAPHENOUS VEIN AND PRP.;  Surgeon: Bill Devi MD;  Location: [facility] OR;  Service: Cardiothoracic    CYSTOSCOPY BLADDER STONE LITHOTRIPSY N/A     DENTAL PROCEDURE Bilateral     3 surgeries inder implants    FEMUR IM NAILING/RODDING Left 12/03/2020    Procedure: LEFT HIP INTRAMEDULLARY NAIL;  Surgeon: Niraj Ravi MD;  Location: [facility] OR;  Service: Orthopedic Spine;  Laterality: Left;    INCISION AND DRAINAGE PERIRECTAL ABSCESS N/A 10/04/2023    Procedure: Incision and drainage of perianal and buttock abscess;  Surgeon: Herbie Villatoro MD;  Location: [facility] OR;  Service: General;  Laterality: N/A;    INGUINAL HERNIA REPAIR Bilateral     TOENAIL EXCISION  06/2022    TONSILLECTOMY Bilateral     TOTAL HIP ARTHROPLASTY REVISION Left 01/11/2022    Procedure: TOTAL HIP ARTHROPLASTY REVISION- POSTERIOR;  Surgeon: Jurgen Candelaria II, MD;  Location: [facility] OR;  Service: Orthopedics;  Laterality: Left;    VASECTOMY N/A       General Information       Row Name 06/21/24 1517          Physical Therapy Time and Intention    Document Type therapy note (daily note)  -CB     Mode of Treatment physical therapy;co-treatment  -CB       Row Name 06/21/24 1517          General Information    Existing Precautions/Restrictions fall;oxygen therapy device and L/min  -CB       Row Name 06/21/24 1517          Cognition    Orientation Status (Cognition) oriented to;person;place  -CB       Row Name 06/21/24 1517          Safety Issues, Functional Mobility    Impairments Affecting Function (Mobility) balance;cognition;endurance/activity tolerance;strength;motor control  -CB     Comment, Safety  Issues/Impairments (Mobility) Co treatment medically appropriate and necessary due to patient acuity level, activity tolerance and safety of patient and staff. Treatment is focusing on progression of care and goals established in the POC.  -CB               User Key  (r) = Recorded By, (t) = Taken By, (c) = Cosigned By      Initials Name Provider Type    Maria Ines Beavers PT Physical Therapist                   Mobility       Row Name 06/21/24 1511          Bed Mobility    Supine-Sit [place] (Bed Mobility) minimum assist (75% patient effort);verbal cues  -CB     Sit-Supine [place] (Bed Mobility) minimum assist (75% patient effort);verbal cues  -CB     Assistive Device (Bed Mobility) bed rails;head of bed elevated;leg   -CB       Row Name 06/21/24 1513          Sit-Stand Transfer    Sit-Stand [place] (Transfers) minimum assist (75% patient effort);moderate assist (50% patient effort);2 person assist;verbal cues  -CB     Assistive Device (Sit-Stand Transfers) walker, front-wheeled  -CB     Comment, (Sit-Stand Transfer) x2 STS reps and VC each time for UE placement from EOB  -CB       Row Name 06/21/24 5909          Gait/Stairs (Locomotion)    [place] Level (Gait) minimum assist (75% patient effort);moderate assist (50% patient effort);2 person assist  -CB     Assistive Device (Gait) walker, front-wheeled  -CB     Distance in Feet (Gait) --  sidesteps along EOB  -CB     Deviations/Abnormal Patterns (Gait) gait speed decreased;farhana decreased;stride length decreased;weight shifting decreased;ataxic  -CB     Bilateral Gait Deviations forward flexed posture  -CB     Comment, (Gait/Stairs) pt is ataxic and poor LE sequencing requiring VC for lateral steps at EOB  -CB               User Key  (r) = Recorded By, (t) = Taken By, (c) = Cosigned By      Initials Name Provider Type    Maria Ines Beavers PT Physical Therapist                   Obj/Interventions       Row Name 06/21/24 4289           Balance    Balance Assessment standing static balance;standing dynamic balance  -CB     Static Sitting Balance standby assist;verbal cues  -CB     Dynamic Sitting Balance contact guard;verbal cues  -CB     Position, Sitting Balance sitting edge of bed  -CB     Static Standing Balance minimal assist;moderate assist;2-person assist;verbal cues  -CB     Dynamic Standing Balance minimal assist;moderate assist;2-person assist;verbal cues  -CB     Position/Device Used, Standing Balance supported;walker, front-wheeled  -CB     Balance Interventions sitting;standing;sit to stand;supported;static;dynamic;minimal challenge  -CB     Comment, Balance sat EOB at least 12min  -CB               User Key  (r) = Recorded By, (t) = Taken By, (c) = Cosigned By      Initials Name Provider Type    CB Maria Ines Florez, PT Physical Therapist                   Goals/Plan       Row Name 06/21/24 1527          Bed Mobility Goal 1 (PT)    Activity/Assistive Device (Bed Mobility Goal 1, PT) bed mobility activities, all  -CB     [place] Level/Cues Needed (Bed Mobility Goal 1, PT) contact guard required  -CB     Time Frame (Bed Mobility Goal 1, PT) 2 weeks  -CB     Progress/Outcomes (Bed Mobility Goal 1, PT) goal ongoing;goal revised this date  -CB       Row Name 06/21/24 1527          Transfer Goal 1 (PT)    Activity/Assistive Device (Transfer Goal 1, PT) sit-to-stand/stand-to-sit  -CB     [place] Level/Cues Needed (Transfer Goal 1, PT) minimum assist (75% or more patient effort)  -CB     Time Frame (Transfer Goal 1, PT) 1 week  -CB     Progress/Outcome (Transfer Goal 1, PT) goal ongoing  -CB       Row Name 06/21/24 1527          Gait Training Goal 1 (PT)    Activity/Assistive Device (Gait Training Goal 1, PT) gait (walking locomotion);walker, rolling  -CB     [place] Level (Gait Training Goal 1, PT) minimum assist (75% or more patient effort)  -CB     Distance (Gait Training Goal 1, PT) 20  -CB     Time Frame (Gait Training Goal  1, PT) 1 week  -CB     Progress/Outcome (Gait Training Goal 1, PT) goal ongoing  -CB       Row Name 06/21/24 1527          Therapy Assessment/Plan (PT)    Planned Therapy Interventions (PT) balance training;bed mobility training;gait training;home exercise program;patient/family education;transfer training;strengthening  -CB               User Key  (r) = Recorded By, (t) = Taken By, (c) = Cosigned By      Initials Name Provider Type    Maria Ines Beavers, PT Physical Therapist                   Clinical Impression       Row Name 06/21/24 1521          Pain    Pretreatment Pain Rating 0/10 - no pain  -CB       Row Name 06/21/24 1521          Plan of Care Review    Plan of Care Reviewed With patient;spouse  -CB     Progress no change  -CB     Outcome Evaluation Patient seen this afternoon for PT/OT. He completed bed mobility requiring Tank and VC for use of UE on bedrail as well as sequencing to EOB. He completed STS to rwx requiring min-modAx2. Pt able to take sidesteps along EOB with min-modAx2.Pt with difficutly sequencing and ataxic. Discussion with spouse regarding safe discharge plan and PT/OT rec SNF at [place]. Spouse states she will be taking pt home at [place] due to bad experience prior at SNF. Pt requires assist x2.  -CB       Row Name 06/21/24 1521          Positioning and Restraints    Pre-Treatment Position in bed  -CB     Post Treatment Position bed  -CB     In Bed notified nsg;fowlers;call light within reach;encouraged to call for assist;exit alarm on;side rails up x3;LUE elevated  bed in chair position  -CB               User Key  (r) = Recorded By, (t) = Taken By, (c) = Cosigned By      Initials Name Provider Type    Maria Ines Beavers, PT Physical Therapist                   Outcome Measures       Row Name 06/21/24 1525          How much help from another person do you currently need...    Turning from your back to your side while in flat bed without using bedrails? 3  -CB     Moving from lying on back to  sitting on the side of a flat bed without bedrails? 2  -CB     Moving to and from a bed to a chair (including a wheelchair)? 2  -CB     Standing up from a chair using your arms (e.g., wheelchair, bedside chair)? 2  -CB     Climbing 3-5 steps with a railing? 1  -CB     To walk in hospital room? 1  -CB     AM-PAC 6 Clicks Score (PT) 11  -CB     Highest Level of Mobility Goal 4 --> Transfer to chair/commode  -CB       Row Name 06/21/24 1525          Functional Assessment    Outcome Measure Options AM-PAC 6 Clicks Basic Mobility (PT)  -CB               User Key  (r) = Recorded By, (t) = Taken By, (c) = Cosigned By      Initials Name Provider Type    Maria Ines Beavers PT Physical Therapist                                 Physical Therapy Education       Title: PT OT SLP Therapies (In Progress)       Topic: Physical Therapy (In Progress)       Point: Mobility training (In Progress)       Learning Progress Summary             Patient Acceptance, E,TB, NR by CB at 6/21/2024 1525    Acceptance, E,D, VU,NR by EB at 6/19/2024 1624    Acceptance, E,D, NR,NL by EB at 6/17/2024 1627    Acceptance, E,TB, NR by ST at 6/14/2024 1538    Acceptance, E,D, NL by EB at 6/12/2024 1321    Acceptance, E, NR by EM at 6/7/2024 1511    Acceptance, E,D, DU by PC at 6/5/2024 1518    Acceptance, E,D, DU,NR by PC at 6/4/2024 1557    Acceptance, E,D, DU by PC at 6/2/2024 1427    Acceptance, E,TB,D, VU by CB1 at 6/1/2024 2218    Acceptance, E,D, DU by PC at 5/31/2024 1612   Family Acceptance, E,TB,D, VU by CB1 at 6/1/2024 2218   Significant Other Acceptance, E, NR by EM at 6/7/2024 1511                         Point: Home exercise program (In Progress)       Learning Progress Summary             Patient Acceptance, E,D, NR,NL by EB at 6/17/2024 1627    Nonacceptance, E, NL,NR by DJ at 6/10/2024 1541    Acceptance, E,D, DU by PC at 6/5/2024 1518    Acceptance, E,MARY ANN, JOSE,NR by PC at 6/4/2024 1557    Acceptance, ED, DU by PC at 6/2/2024 1427     Acceptance, E,TB,D, VU by CB1 at 6/1/2024 2218    Acceptance, E,D, DU by PC at 5/31/2024 1612   Family Nonacceptance, E, NL,NR by DJ at 6/10/2024 1541    Acceptance, E,TB,D, VU by CB1 at 6/1/2024 2218                         Point: Body mechanics (In Progress)       Learning Progress Summary             Patient Acceptance, E,TB, NR by CB at 6/21/2024 1525    Acceptance, E,D, VU,NR by EB at 6/19/2024 1624    Acceptance, E,D, NR,NL by EB at 6/17/2024 1627    Acceptance, E,TB, NR by ST at 6/14/2024 1538    Acceptance, E,D, NL by EB at 6/12/2024 1321    Nonacceptance, E, NL,NR by DJ at 6/10/2024 1541    Acceptance, E,D, DU by PC at 6/5/2024 1518    Acceptance, E,D, DU,NR by PC at 6/4/2024 1557    Acceptance, E,D, DU by PC at 6/2/2024 1427    Acceptance, E,TB,D, VU by CB1 at 6/1/2024 2218    Acceptance, E,D, DU by PC at 5/31/2024 1612   Family Nonacceptance, E, NL,NR by DJ at 6/10/2024 1541    Acceptance, E,TB,D, VU by CB1 at 6/1/2024 2218                         Point: Precautions (In Progress)       Learning Progress Summary             Patient Acceptance, E,TB, NR by CB at 6/21/2024 1525    Acceptance, E,D, VU,NR by EB at 6/19/2024 1624    Nonacceptance, E, NL,NR by DJ at 6/10/2024 1541    Acceptance, E,D, DU by PC at 6/5/2024 1518    Acceptance, E,D, DU,NR by PC at 6/4/2024 1557    Acceptance, E,D, DU by PC at 6/2/2024 1427    Acceptance, E,TB,D, VU by CB1 at 6/1/2024 2218    Acceptance, E,D, DU by PC at 5/31/2024 1612   Family Nonacceptance, E, NL,NR by DJ at 6/10/2024 1541    Acceptance, E,TB,D, VU by CB1 at 6/1/2024 2218                                         User Key       Initials Effective Dates Name Provider Type Discipline    PC 06/16/21 -  Gemma Hamilton, PT Physical Therapist PT    EM 06/16/21 -  Nila Toro, PT Physical Therapist PT    EB 02/14/23 -  Gina Fortuen, RONY Physical Therapist Assistant PT    DJ 10/25/19 -  Jacqueline Lizarraga, PT Physical Therapist PT    CB1 02/21/24 -  Murphy Herndon, LUC  Registered Nurse Nurse    CB 10/22/21 -  Maria Ines Florez, PT Physical Therapist PT    ST 09/22/22 -  Sahara Harrison PT Physical Therapist PT                  PT Recommendation and Plan  Planned Therapy Interventions (PT): balance training, bed mobility training, gait training, home exercise program, patient/family education, transfer training, strengthening  Plan of Care Reviewed With: patient, spouse  Progress: no change  Outcome Evaluation: Patient seen this afternoon for PT/OT. He completed bed mobility requiring Tank and VC for use of UE on bedrail as well as sequencing to EOB. He completed STS to rwx requiring min-modAx2. Pt able to take sidesteps along EOB with min-modAx2.Pt with difficutly sequencing and ataxic. Discussion with spouse regarding safe discharge plan and PT/OT rec SNF at [place]. Spouse states she will be taking pt home at [place] due to bad experience prior at SNF. Pt requires assist x2.     Time Calculation:         PT Charges       Row Name 06/21/24 1527             Time Calculation    Start Time 1358  -CB      Stop Time 1439  -CB      Time Calculation (min) 41 min  -CB      PT Received On 06/21/24  -CB      PT - Next Appointment 06/24/24  -CB      PT Goal Re-Cert Due Date 07/05/24  -CB         Time Calculation- PT    Total Timed Code Minutes- PT 41 minute(s)  -CB         Timed Charges    59822 - PT Therapeutic Activity Minutes 41  -CB         Total Minutes    Timed Charges Total Minutes 41  -CB       Total Minutes 41  -CB                User Key  (r) = Recorded By, (t) = Taken By, (c) = Cosigned By      Initials Name Provider Type    CB Maria Ines Florez, PT Physical Therapist                  Therapy Charges for Today       Code Description Service Date Service Provider Modifiers Qty    [id number]  PT THERAPEUTIC ACT EA 15 MIN 6/21/2024 Maria Ines Florez, PT GP 3            PT G-Codes  Outcome Measure Options: AM-PAC 6 Clicks Basic Mobility (PT)  AM-PAC 6 Clicks Score (PT): 11  AM-PAC 6 Clicks  Score (OT): 10  Modified Stratford Scale: 4 - Moderately severe disability.  Unable to walk without assistance, and unable to attend to own bodily needs without assistance.  PT Discharge Summary  Anticipated Discharge Disposition (PT): skilled nursing facility    Maria Ines Florez, PT  6/21/2024

## 2024-06-21 NOTE — PROGRESS NOTES
"  Infectious Diseases Progress Note    Tino Nagel MD     [facility]  [place]: 22 days  Patient Identification:  Name: Dilip Verma  Age: 74 y.o.  Sex: male  :  1950  MRN: [id number]         Primary Care Physician: Fernando Camargo, DO        Subjective: Continues to improve and doing well.  Interval History: See consultation note.  2024 MRSA screen is negative.  2024 completed IV vancomycin for left arm cellulitis and phlebitis with improvement  Objective:    Scheduled Meds:amiodarone, 200 mg, Oral, Q24H  [Held by provider] apixaban, 5 mg, Oral, Q12H  aspirin, 81 mg, Oral, Daily  brimonidine, 1 drop, Both Eyes, BID  DULoxetine, 30 mg, Oral, Daily  guaiFENesin, 600 mg, Oral, Q12H  insulin lispro, 2-9 Units, Subcutaneous, 4x Daily AC & at Bedtime  ipratropium-albuterol, 3 mL, Nebulization, 4x Daily - RT  latanoprost, 1 drop, Both Eyes, Nightly  Menthol-Zinc Oxide, 1 Application, Topical, Q12H  metoprolol tartrate, 12.5 mg, Oral, Q12H  OLANZapine, 5 mg, Oral, Nightly  polyethylene glycol, 17 g, Oral, Daily  senna-docusate sodium, 2 tablet, Oral, BID  sodium bicarbonate, 650 mg, Oral, TID  sodium chloride, 10 mL, Intravenous, Q12H  sodium chloride, 10 mL, Intravenous, Q12H  sodium chloride, 4 mL, Nebulization, BID - RT  terazosin, 1 mg, Oral, Nightly  thiamine, 100 mg, Oral, Daily      Continuous Infusions:heparin, 10.4 Units/kg/hr, Last Rate: 16.4 Units/kg/hr (24 0239)            Vital signs in last 24 hours:  Temp:  [97.7 °F (36.5 °C)-98.6 °F (37 °C)] 98.4 °F (36.9 °C)  Heart Rate:  [61-67] 64  Resp:  [16-22] 16  BP: (119-162)/(60-76) 152/74    Intake/Output:    Intake/Output Summary (Last 24 hours) at 2024 0734  Last data filed at 2024 0320  Gross per 24 hour   Intake 1320 ml   Output 890 ml   Net 430 ml           Exam:  /74 (BP Location: Right arm, Patient Position: Lying)   Pulse 64   Temp 98.4 °F (36.9 °C) (Oral)   Resp 16   Ht 172.7 cm (68\")   Wt 96.1 kg " (211 lb 13.8 oz)   SpO2 97%   BMI 32.21 kg/m²   Patient is examined using the personal protective equipment as per guidelines from infection control for this particular patient as enacted.  Hand washing was performed before and after patient interaction.  General Appearance: Confused interactive and follows command.                          Head:    Normocephalic, without obvious abnormality, atraumatic                           Eyes:    PERRL, conjunctivae/corneas clear, EOM's intact, both eyes                         Throat:   Lips, tongue, gums normal; oral mucosa pink and moist                           Neck:   Supple, symmetrical, trachea midline, no JVD                         Lungs:    Clear to auscultation bilaterally, respirations unlabored                 Chest Wall:    No tenderness or deformity                          Heart:  S1-S2 regular                  Abdomen:   Soft nontender                 Extremities: Improvement in swelling and redness of the left arm.  Left lower extremity edema and swelling is much improved.                    Neurologic: No acute distress       Data Review:    I reviewed the patient's new clinical results.  Results from last 7 days   Lab Units 06/18/24  0639 06/17/24  0600 06/16/24  0546 06/15/24  0703   WBC 10*3/mm3 6.79 7.06 7.97 7.52   HEMOGLOBIN g/dL 10.5* 10.1* 10.4* 10.7*   PLATELETS 10*3/mm3 256 250 263 268     Results from last 7 days   Lab Units 06/20/24  0822 06/19/24  0753 06/18/24  0639 06/17/24  0559 06/16/24  0546 06/15/24  0703   SODIUM mmol/L 139 139 138 137 136 136   POTASSIUM mmol/L 4.4 4.2 4.4 4.5 4.4 4.1   CHLORIDE mmol/L 104 105 105 105 101 100   CO2 mmol/L 27.0 24.0 20.6* 22.0 20.5* 20.9*   BUN mg/dL 38* 35* 27* 53* 44* 66*   CREATININE mg/dL 3.36* 3.64* 3.46* 5.23* 4.81* 6.93*   CALCIUM mg/dL 8.4* 8.3* 8.2* 8.0* 8.1* 8.2*   GLUCOSE mg/dL 195* 135* 111* 141* 159* 143*     Microbiology Results (last 10 days)       ** No results found for the last 240  hours. **            Assessment:    Acute UTI (urinary tract infection)    Type 2 diabetes mellitus with hyperglycemia, with long-term current use of insulin    CAD (coronary artery disease)    Hx of CABG    Chronic diastolic heart failure    CSA (central sleep apnea)    HTN (hypertension)    History of stroke    CKD (chronic kidney disease) stage 3, GFR 30-59 ml/min    Peripheral arterial disease    Sepsis    Hyperkalemia    Metabolic encephalopathy    AMENA (acute kidney injury)    Paroxysmal atrial fibrillation  1-metabolic encephalopathy due to  2-systemic infection as a result of urinary tract infection as well as evolving sepsis traumatic cellulitis of the left leg.  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     Recommendations/Discussions:  Despite the fact that he is negative MRSA screen his right arm-continue with IV vancomycin for phlebitis and secondary cellulitis of the left arm as it is showing improvement.  Completed Vancomycin on 6/19/2024 - going forward observe off antibiotics  Continue with left arm elevation  Continue supportive care per primary team.  Tino Nagel MD  6/21/2024  07:34 EDT    Parts of this note may be an electronic transcription/translation of spoken language to printed text using the Dragon dictation system.

## 2024-06-21 NOTE — THERAPY TREATMENT NOTE
Patient Name: Dilip Verma  : 1950    MRN: [id number]                              Today's Date: 2024       Admit Date: 2024    Visit Dx:     ICD-10-CM ICD-9-CM   1. Acute respiratory failure with hypoxia  J96.01 518.81   2. Sepsis without acute organ dysfunction, due to unspecified organism  A41.9 038.9     995.91   3. Hyperkalemia  E87.5 276.7   4. Metabolic encephalopathy  G93.41 348.31   5. Acute UTI  N39.0 599.0   6. Hyperglycemia due to diabetes mellitus  E11.65 250.02   7. Chronic heart failure with preserved ejection fraction (HFpEF)  I50.32 428.9   8. Acute UTI (urinary tract infection)  N39.0 599.0     Patient Active Problem List   Diagnosis    Anxiety    Colon polyp    Type 2 diabetes mellitus with hyperglycemia, with long-term current use of insulin    Erectile dysfunction    Hyperlipidemia    Type 2 acute myocardial infarction    CAD (coronary artery disease)    Hx of CABG    Chronic diastolic heart failure    Hypersomnia due to medical condition    CSA (central sleep apnea)    Periodic breathing    HTN (hypertension)    History of stroke    CKD (chronic kidney disease) stage 3, GFR 30-59 ml/min    Urinary retention    Acute on chronic renal failure    Acute UTI (urinary tract infection)    Acute on chronic diastolic (congestive) heart failure    Bacteriuria    Rectal pain    Status post total replacement of hip    Vitamin D deficiency    Post-acute COVID-19 syndrome    Insulin dose changed    Arthropathy associated with neurological disorder    Personal history of colonic polyps    Type 2 diabetes mellitus with diabetic neuropathy, with long-term current use of insulin    Cervical spinal stenosis    Abscess of skin    Overweight    Cervical stenosis of spine    Spinal stenosis, lumbar region, without neurogenic claudication    Medically noncompliant    Chronic heart failure with preserved ejection fraction (HFpEF)    Carotid stenosis    Peripheral arterial disease    Sepsis     Hyperkalemia    Metabolic encephalopathy    AMENA (acute kidney injury)    Paroxysmal atrial fibrillation     Past Medical History:   Diagnosis Date    AMENA (acute kidney injury) 12/03/2020    Alcoholism 1989    not since 1998    CORI positive     Anemia     Anxiety     Ataxia     Atypical chest pain 04/19/2022    SEEN AT [facility] ER    Balance disorder     Carotid stenosis 3/28/2024    Cataract     BILATERAL, S/P EXTRACTION    Cervical radiculopathy 11/11/2019    SEEN AT  [facility]    Cervical spinal cord compression     Chronic diastolic (congestive) heart failure     Chronic kidney disease     STAGE 3, FOLLOWED BY DR. VIVAR    Chronic pancreatitis     Closed left subtrochanteric femur fracture 12/01/2020    ADMITTED TO [facility]    Closed nondisplaced intertrochanteric fracture of left femur 12/01/2020    ADMITTED TO [facility]    Colon polyps     FOLLOWED BY DR. AVTAR JEONG    Constipation     Contracture, right hand     Coronary artery disease     CABG 7/2019    COVID-19 07/2022    DDD (degenerative disc disease), cervical     Diabetes mellitus, type II     IDDM    Diabetic retinopathy     Difficulty walking     Dysphagia     Elevated brain natriuretic peptide (BNP) level 08/2014    Elevated LFTs 03/2021    Erectile dysfunction     Fissure, anal 2022    Foot drop     Fuchs' corneal dystrophy of right eye     Glaucoma     BILATERAL    History of alcohol abuse     Hyperlipidemia     Hypersomnia     Hypertension     Hypertensive urgency 02/25/2020    ADMITTED TO [facility]    Insomnia     Kidney stones     LV dysfunction 06/2016    Lyme disease     Lymphadenopathy syndrome 05/2021    Macular edema     BILATERAL    Myocardial infarction 11/05/2019    NSTEMI, ADMITTED TO [facility]    Myocardial infarction 07/12/2019    NSTEMI, ADMITTED TO [facility]    Neuropathy in diabetes     Non-celiac gluten sensitivity     Orthostasis     Osteoporosis     Oxygen dependent     PAD (peripheral artery disease)     Paroxysmal atrial fibrillation 6/6/2024    PNA (pneumonia)  "06/2016    LEFT LOBE    Polyneuropathy     PTSD (post-traumatic stress disorder)     Pulmonary hypertension     Pulmonary nodule     Senile ectropion of both lower eyelids 02/2022    Sepsis 12/16/2020    D[facility]TI[facility]    Sleep apnea     STATES DOESN'T USE BIPAP OR CPAP    Spinal stenosis in cervical region     SEVERE-LIMITED ROM    Stroke 2012    \"slight stroke\"    Syncope and collapse 07/06/2019    ADMITTED TO [facility]    Urinary retention 04/05/2021    [facility]    Vision loss     Vitamin D deficiency      Past Surgical History:   Procedure Laterality Date    CARDIAC CATHETERIZATION N/A 07/15/2019    Procedure: Coronary angiography;  Surgeon: Carrie Price MD;  Location:  [facility];  Service: Cardiovascular    CARDIAC CATHETERIZATION N/A 07/15/2019    Procedure: Left Heart Cath;  Surgeon: Carrie Price MD;  Location:  [facility];  Service: Cardiovascular    CARDIAC CATHETERIZATION N/A 07/15/2019    Procedure: Left ventriculography;  Surgeon: Carrie Price MD;  Location:  [facility];  Service: Cardiovascular    CARDIAC CATHETERIZATION  07/15/2019    Procedure: Functional Flow [place];  Surgeon: Carrie Price MD;  Location:  [facility];  Service: Cardiovascular    CARDIAC CATHETERIZATION N/A 11/06/2019    Procedure: Right and Left Heart Cath;  Surgeon: Mya Smith MD;  Location:  [facility];  Service: Cardiovascular    CARDIAC CATHETERIZATION N/A 11/06/2019    Procedure: Coronary angiography;  Surgeon: Mya Smith MD;  Location:  [facility];  Service: Cardiovascular    CARDIAC SURGERY      CATARACT EXTRACTION Left 2014    CATARACT EXTRACTION Right 11/2016    PHACO/IOL, DR. LEN DENTON    COLONOSCOPY N/A 03/16/2023    ENTIRE COLON WNL, RESCOPE IN 5 YRS, [facility] LIN[facility]    COLONOSCOPY W/ POLYPECTOMY N/A 01/02/2015    A FEW DIVERTICULA IN SIGMOID, 6 MM TUBULOVILLOUS " ADENOMA POLYP IN RECTUM, SMALL HEMORRHOIDS, MELANOSIS COLI, DR. AVTAR JEONG AT [facility]    CORONARY ARTERY BYPASS GRAFT N/A 07/18/2019    Procedure: INTRAOPERATIVE SHOAIB; STERNOTOMY CORONARY ARTERY BYPASS x 3  USING LEFT INTERNAL MAMMARY ARTERY GRAFT UTILIZING ENDOSCOPICALLY HARVESTED RIGHT GREATER SAPHENOUS VEIN AND PRP.;  Surgeon: Bill Devi MD;  Location: [facility] OR;  Service: Cardiothoracic    CYSTOSCOPY BLADDER STONE LITHOTRIPSY N/A     DENTAL PROCEDURE Bilateral     3 surgeries inder implants    FEMUR IM NAILING/RODDING Left 12/03/2020    Procedure: LEFT HIP INTRAMEDULLARY NAIL;  Surgeon: Niraj Ravi MD;  Location: [facility] OR;  Service: Orthopedic Spine;  Laterality: Left;    INCISION AND DRAINAGE PERIRECTAL ABSCESS N/A 10/04/2023    Procedure: Incision and drainage of perianal and buttock abscess;  Surgeon: Herbie Villatoro MD;  Location: [facility] OR;  Service: General;  Laterality: N/A;    INGUINAL HERNIA REPAIR Bilateral     TOENAIL EXCISION  06/2022    TONSILLECTOMY Bilateral     TOTAL HIP ARTHROPLASTY REVISION Left 01/11/2022    Procedure: TOTAL HIP ARTHROPLASTY REVISION- POSTERIOR;  Surgeon: Jurgen Candelaria II, MD;  Location: [facility] OR;  Service: Orthopedics;  Laterality: Left;    VASECTOMY N/A       General Information       Row Name 06/21/24 1537          OT Time and Intention    Document Type therapy note (daily note)  -CE     Mode of Treatment co-treatment;occupational therapy  -CE       Row Name 06/21/24 1537          General Information    Patient Profile Reviewed yes  -CE     Existing Precautions/Restrictions fall;oxygen therapy device and L/min  -CE     Barriers to Rehab medically complex;previous functional deficit  -CE       Row Name 06/21/24 1537          Cognition    Orientation Status (Cognition) oriented to;person;place  -CE       Row Name 06/21/24 1537          Safety Issues, Functional Mobility    Safety Issues Affecting Function  (Mobility) insight into deficits/self-awareness;positioning of assistive device;problem-solving;safety precautions follow-through/compliance  -CE     Impairments Affecting Function (Mobility) balance;cognition;endurance/activity tolerance;strength;motor control  -CE     Cognitive Impairments, Mobility Safety/Performance insight into deficits/self-awareness;judgment;problem-solving/reasoning;safety precaution follow-through  -CE     Comment, Safety Issues/Impairments (Mobility) co-tx medically appropriate 2/2 acuity level and to maintain safety of staff and patient  -               User Key  (r) = Recorded By, (t) = Taken By, (c) = Cosigned By      Initials Name Provider Type    CE Loren Smith OT Occupational Therapist                     Mobility/ADL's       Row Name 06/21/24 1540          Bed Mobility    Supine-Sit [place] (Bed Mobility) minimum assist (75% patient effort);verbal cues  -CE     Sit-Supine [place] (Bed Mobility) minimum assist (75% patient effort);verbal cues  -CE     Assistive Device (Bed Mobility) bed rails;head of bed elevated  -       Row Name 06/21/24 1540          Transfers    Transfers sit-stand transfer;stand-sit transfer  -       Row Name 06/21/24 1540          Sit-Stand Transfer    Sit-Stand [place] (Transfers) minimum assist (75% patient effort);moderate assist (50% patient effort);2 person assist;verbal cues  -CE     Assistive Device (Sit-Stand Transfers) walker, front-wheeled  -CE     Comment, (Sit-Stand Transfer) x 2 reps STS with cues each time for hand placement  -       Row Name 06/21/24 1540          Stand-Sit Transfer    Stand-Sit [place] (Transfers) moderate assist (50% patient effort);2 person assist;verbal cues  -CE     Assistive Device (Stand-Sit Transfers) walker, front-wheeled  -CE       Row Name 06/21/24 1540          Functional Mobility    Functional Mobility- Ind. Level minimum assist (75% patient effort);2 person assist required  -      Functional Mobility- Device walker, front-wheeled  -CE     Functional Mobility- Comment able to side step bilaterallywith Tank x 2 and cues for sequencing; con't to demonstrate significant LE ataxia and locks knees in extension and braces on bed to maintain upright at times  -CE       Row Name 06/21/24 1540          Activities of Daily Living    BADL Assessment/Intervention upper body dressing  -CE       Row Name 06/21/24 1540          Upper Body Dressing Assessment/Training    [place] Level (Upper Body Dressing) don;doff;moderate assist (50% patient effort)  gown  -CE     Position (Upper Body Dressing) edge of bed sitting  -CE               User Key  (r) = Recorded By, (t) = Taken By, (c) = Cosigned By      Initials Name Provider Type    Loren Temple OT Occupational Therapist                   Obj/Interventions       Row Name 06/21/24 1542          Motor Skills    Functional Endurance improving but cont' to be lmited and fatigues after transfers x 2  -CE       Row Name 06/21/24 1542          Balance    Dynamic Standing Balance 2-person assist;minimal assist;moderate assist  -CE     Position/Device Used, Standing Balance walker, front-wheeled  -CE               User Key  (r) = Recorded By, (t) = Taken By, (c) = Cosigned By      Initials Name Provider Type    Loren Temple OT Occupational Therapist                   Goals/Plan    No documentation.                  Clinical Impression       Row Name 06/21/24 1543          Pain Assessment    Pretreatment Pain Rating 0/10 - no pain  -CE     Posttreatment Pain Rating 0/10 - no pain  -CE       Row Name 06/21/24 1543          Plan of Care Review    Progress no change  -CE     Outcome Evaluation Pt agreeable to OT and PT co-tx this date, able to mobilize to EOB with Tank this date. Required min/modA x 2 for STS and side stepping bilaterally with significant ataxia in BLEs requiring knees locking in extension to maintain balance. Discussed dispo and  wife adament that she is taking patient home and that she is the only one who can assist. OT and PT con't to recomend placement at SNF prior to returning home to maximize safety and strength in prep for transfers and ADLs.  -CE       Row Name 06/21/24 1543          Therapy Plan Review/Discharge Plan (OT)    Anticipated Discharge Disposition (OT) skilled nursing facility  -CE       Row Name 06/21/24 1543          Vital Signs    O2 Delivery Pre Treatment supplemental O2  -CE     O2 Delivery Intra Treatment supplemental O2  -CE     O2 Delivery Post Treatment supplemental O2  -CE     Pre Patient Position Supine  -CE     Intra Patient Position Standing  -CE     Post Patient Position Supine  -CE       Row Name 06/21/24 1548          Positioning and Restraints    Pre-Treatment Position in bed  -CE     Post Treatment Position bed  -CE     In Bed notified nsg;call light within reach;encouraged to call for assist;exit alarm on  bed>chair position  -CE               User Key  (r) = Recorded By, (t) = Taken By, (c) = Cosigned By      Initials Name Provider Type    CE Loren Smith, OT Occupational Therapist                   Outcome Measures       Row Name 06/21/24 3766          How much help from another is currently needed...    Putting on and taking off regular lower body clothing? 1  -CE     Bathing (including washing, rinsing, and drying) 2  -CE     Toileting (which includes using toilet bed pan or urinal) 1  -CE     Putting on and taking off regular upper body clothing 2  -CE     Taking care of personal grooming (such as brushing teeth) 2  -CE     Eating meals 2  -CE     AM-PAC 6 Clicks Score (OT) 10  -CE       Row Name 06/21/24 152          How much help from another person do you currently need...    Turning from your back to your side while in flat bed without using bedrails? 3  -CB     Moving from lying on back to sitting on the side of a flat bed without bedrails? 2  -CB     Moving to and from a bed to a chair  (including a wheelchair)? 2  -CB     Standing up from a chair using your arms (e.g., wheelchair, bedside chair)? 2  -CB     Climbing 3-5 steps with a railing? 1  -CB     To walk in hospital room? 1  -CB     AM-PAC 6 Clicks Score (PT) 11  -CB     Highest Level of Mobility Goal 4 --> Transfer to chair/commode  -CB       Row Name 06/21/24 1546 06/21/24 1525       Functional Assessment    Outcome Measure Options AM-PAC 6 Clicks Daily Activity (OT)  -CE AM-PAC 6 Clicks Basic Mobility (PT)  -CB              User Key  (r) = Recorded By, (t) = Taken By, (c) = Cosigned By      Initials Name Provider Type    Maria Ines Beavers, PT Physical Therapist    CE Loren Smith, OT Occupational Therapist                    Occupational Therapy Education       Title: PT OT SLP Therapies (In Progress)       Topic: Occupational Therapy (Done)       Point: ADL training (Done)       Description:   Instruct learner(s) on proper safety adaptation and remediation techniques during self care or transfers.   Instruct in proper use of assistive devices.                  Learning Progress Summary             Patient Acceptance, E,TB,D, VU by CB at 6/1/2024 2218    Acceptance, E, VU by  at 5/31/2024 1056   Family Acceptance, E,TB,D, VU by  at 6/1/2024 2218    Acceptance, E, VU by  at 5/31/2024 1056                         Point: Home exercise program (Done)       Description:   Instruct learner(s) on appropriate technique for monitoring, assisting and/or progressing therapeutic exercises/activities.                  Learning Progress Summary             Patient Acceptance, E,TB,D, VU by CB at 6/1/2024 2218    Acceptance, E, VU by  at 5/31/2024 1056   Family Acceptance, E,TB,D, VU by  at 6/1/2024 2218    Acceptance, E, VU by  at 5/31/2024 1056                         Point: Precautions (Done)       Description:   Instruct learner(s) on prescribed precautions during self-care and functional transfers.                  Learning  Progress Summary             Patient Acceptance, E,TB,D, VU by  at 6/1/2024 2218    Acceptance, E, VU by  at 5/31/2024 1056   Family Acceptance, E,TB,D, VU by  at 6/1/2024 2218    Acceptance, E, VU by  at 5/31/2024 1056                         Point: Body mechanics (Done)       Description:   Instruct learner(s) on proper positioning and spine alignment during self-care, functional mobility activities and/or exercises.                  Learning Progress Summary             Patient Acceptance, E,TB,D, VU by  at 6/1/2024 2218    Acceptance, E, VU by  at 5/31/2024 1056   Family Acceptance, E,TB,D, VU by  at 6/1/2024 2218    Acceptance, E, VU by  at 5/31/2024 1056                                         User Key       Initials Effective Dates Name Provider Type Discipline     02/21/24 -  Murphy Herndon RN Registered Nurse Nurse     04/30/24 -  Annita Tabares OT Student OT Student OT                  OT Recommendation and Plan     Plan of Care Review  Progress: no change  Outcome Evaluation: Pt agreeable to OT and PT co-tx this date, able to mobilize to EOB with Tank this date. Required min/modA x 2 for STS and side stepping bilaterally with significant ataxia in BLEs requiring knees locking in extension to maintain balance. Discussed dispo and wife adament that she is taking patient home and that she is the only one who can assist. OT and PT con't to recomend placement at SNF prior to returning home to maximize safety and strength in prep for transfers and ADLs.     Time Calculation:         Time Calculation- OT       Row Name 06/21/24 1546             Time Calculation- OT    OT Start Time 1357  -CE      OT Stop Time 1436  -CE      OT Time Calculation (min) 39 min  -CE      Total Timed Code Minutes- OT 39 minute(s)  -CE      OT Received On 06/21/24  -CE      OT - Next Appointment 06/24/24  -CE         Timed Charges    32118 - OT Therapeutic Activity Minutes 15  -CE      07596 - OT Self Care/Mgmt  Minutes 24  -CE         Total Minutes    Timed Charges Total Minutes 39  -CE       Total Minutes 39  -CE                User Key  (r) = Recorded By, (t) = Taken By, (c) = Cosigned By      Initials Name Provider Type    Loren Temple OT Occupational Therapist                  Therapy Charges for Today       Code Description Service Date Service Provider Modifiers Qty    [id number]  OT THERAPEUTIC ACT EA 15 MIN 6/21/2024 Loren Smith OT GO 1    [id number]  OT SELF CARE/MGMT/TRAIN EA 15 MIN 6/21/2024 Loren Smith OT GO 2                 Loren Smith OT  6/21/2024

## 2024-06-21 NOTE — PLAN OF CARE
Goal Outcome Evaluation:  Plan of Care Reviewed With: patient, spouse        Progress: no change  Outcome Evaluation: Patient seen this afternoon for PT/OT. He completed bed mobility requiring Tank and VC for use of UE on bedrail as well as sequencing to EOB. He completed STS to rwx requiring min-modAx2. Pt able to take sidesteps along EOB with min-modAx2.Pt with difficutly sequencing and ataxic. Discussion with spouse regarding safe discharge plan and PT/[facility]. Spouse states [facility] will be [facility] due to bad experience prior at SNF. Pt requires assist x2.      Anticipated Discharge Disposition (PT): skilled nursing facility

## 2024-06-21 NOTE — PROGRESS NOTES
[facility]     LOS: 22 days     Subjective:    CC:Altered Mental Status    DIET:  Diet Order   Procedures    Diet: Regular/House, Diabetic, Cardiac; Healthy Heart (2-3 Na+); Consistent Carbohydrate; Texture: Regular (IDDSI 7); Fluid Consistency: Thin (IDDSI 0)     Doing better each day  Eating well  Working with PT    Objective:    Vital Signs:  Temp:  [97.7 °F (36.5 °C)-98.6 °F (37 °C)] 98.6 °F (37 °C)  Heart Rate:  [61-67] 65  Resp:  [15-20] 15  BP: (119-154)/(60-74) 131/62    SpO2:  [88 %-97 %] 93 %  on  Flow (L/min):  [1-4] 4;   Device (Oxygen Therapy): nasal cannula;humidified  Body mass index is 32.21 kg/m².    Physical Exam  Constitutional:       Appearance: Normal appearance.      Comments: Ij in place   HENT:      Head: Normocephalic and atraumatic.   Cardiovascular:      Rate and Rhythm: Normal rate and regular rhythm.      Heart sounds: No murmur heard.     No friction rub.   Pulmonary:      Effort: Pulmonary effort is normal.      Breath sounds: Normal breath sounds.   Abdominal:      General: Bowel sounds are normal. There is no distension.      Palpations: Abdomen is soft.      Tenderness: There is no abdominal tenderness.   Musculoskeletal:      Comments: Right arm   Skin:     General: Skin is warm and dry.   Neurological:      Mental Status: He is alert.   Psychiatric:         Mood and Affect: Mood normal.         Behavior: Behavior normal.         Results Review:    Glucose   Date Value Ref Range Status   06/21/2024 170 (H) 65 - 99 mg/dL Final   06/20/2024 195 (H) 65 - 99 mg/dL Final   06/19/2024 135 (H) 65 - 99 mg/dL Final     Results from last 7 days   Lab Units 06/18/24  0639   WBC 10*3/mm3 6.79   HEMOGLOBIN g/dL 10.5*   HEMATOCRIT % 31.9*   PLATELETS 10*3/mm3 256     Results from last 7 days   Lab Units 06/21/24  0732   SODIUM mmol/L 142   POTASSIUM mmol/L 4.7   CHLORIDE mmol/L 106   CO2 mmol/L 28.0   BUN mg/dL 40*   CREATININE mg/dL 3.45*   CALCIUM mg/dL 8.5*  "  GLUCOSE mg/dL 170*     Results from last 7 days   Lab Units 06/21/24  0732   APTT seconds 78.9*             Cultures:  No results found for: \"BLOODCX\", \"URINECX\", \"WOUNDCX\", \"MRSACX\", \"RESPCX\", \"STOOLCX\"    I have reviewed daily medications and changes in CPOE    Scheduled meds  amiodarone, 200 mg, Oral, Q24H  [Held by provider] apixaban, 5 mg, Oral, Q12H  aspirin, 81 mg, Oral, Daily  brimonidine, 1 drop, Both Eyes, BID  DULoxetine, 30 mg, Oral, Daily  guaiFENesin, 600 mg, Oral, Q12H  insulin lispro, 2-9 Units, Subcutaneous, 4x Daily AC & at Bedtime  ipratropium-albuterol, 3 mL, Nebulization, 4x Daily - RT  latanoprost, 1 drop, Both Eyes, Nightly  Menthol-Zinc Oxide, 1 Application, Topical, Q12H  metoprolol tartrate, 12.5 mg, Oral, Q12H  OLANZapine, 5 mg, Oral, Nightly  polyethylene glycol, 17 g, Oral, Daily  senna-docusate sodium, 2 tablet, Oral, BID  sodium bicarbonate, 650 mg, Oral, TID  sodium chloride, 10 mL, Intravenous, Q12H  sodium chloride, 10 mL, Intravenous, Q12H  sodium chloride, 4 mL, Nebulization, BID - RT  terazosin, 1 mg, Oral, Nightly  thiamine, 100 mg, Oral, Daily        heparin, 10.4 Units/kg/hr, Last Rate: 16.4 Units/kg/hr (06/20/24 0239)      PRN meds    acetaminophen **OR** acetaminophen **OR** acetaminophen    senna-docusate sodium **AND** polyethylene glycol **AND** bisacodyl **AND** bisacodyl    dextrose    dextrose    glucagon (human recombinant)    heparin (porcine)    midodrine    nitroglycerin    OLANZapine    ondansetron    sodium chloride    sodium chloride    sodium chloride    sodium chloride    traMADol        Acute UTI (urinary tract infection)    Type 2 diabetes mellitus with hyperglycemia, with long-term current use of insulin    CAD (coronary artery disease)    Hx of CABG    Chronic diastolic heart failure    CSA (central sleep apnea)    HTN (hypertension)    History of stroke    CKD (chronic kidney disease) stage 3, GFR 30-59 ml/min    Peripheral arterial disease    Sepsis    " Hyperkalemia    Metabolic encephalopathy    AMENA (acute kidney injury)    Paroxysmal atrial fibrillation        Assessment/Plan:    74 y.o. male admitted with Acute UTI (urinary tract infection).     Assessment and plan  Acute UTI/LLE Cellulitis  - urine culture grew Serratia  - completed 9d of abx-cefepime was discontinued 6/8 due to high suspicion of this causing toxic encephalopathy.  -Per ID:Despite the fact that he is negative MRSA screen his right arm-continue with IV vancomycin for phlebitis and secondary cellulitis of the left arm as it is showing improvement.  Continue antibiotic treatment for 7 days with continued local care elevation and monitoring and local care of the scabbed wound on the medial aspect of the midforearm.      HTNCAD/PAD/New Onset Afib (PAF)/Chronic Diastolic CHF  - BP acceptable, no anginal symptoms, in NSR  - continue on current regimen  - heparin gtt, hold on eliquis until we know for sure he does not need a tunnel cath     Type 2 DM  - had hypoglycemia, improved off of lantus  - continue D5W, getting thiamine also  - continue coverage with ssi/hypoglycemia protocol     AMENA  -Nephrology on board, follow management recommendations.  -Initiated on dialysis, mental status improving after dialysis management.     Hypoxia  - CXR noted, improved after additional dose of bumex  - monitor and encourage pulmonary toilet as able     Delirium/Toxic Encephalopathy  - continue on scheduled and PRN zyprexa  - he is current with palliatus at home and takes PRN tramadol, this has been restarted     Hypokalemia  - replace K+     BP is good holding midodrine  Still on oxygen slightly increased amount to 4 liters (he has oxygen at night at home)  Still on heparin and change to eliquis when we have final plans about dialysis  Creatinine is better    Que Shah MD  06/21/24  10:22 EDT

## 2024-06-21 NOTE — PLAN OF CARE
Goal Outcome Evaluation:           Progress: no change  Outcome Evaluation: Pt agreeable to OT and PT co-tx this date, able to mobilize to EOB with Tank this date. Required min/modA x 2 for STS and side stepping bilaterally with significant ataxia in BLEs requiring knees locking in extension to maintain balance. Discussed dispo and wife adament that she is taking patient home and that she is the only one who can assist. OT and PT con't to recomend placement at SNF prior to returning home to maximize safety and strength in prep for transfers and ADLs.      Anticipated Discharge Disposition (OT): skilled nursing facility

## 2024-06-22 LAB
ANION GAP SERPL CALCULATED.3IONS-SCNC: 7.5 MMOL/L (ref 5–15)
APTT PPP: 82.2 SECONDS (ref 22.7–35.4)
BACTERIA SPEC AEROBE CULT: ABNORMAL
BASOPHILS # BLD AUTO: 0.03 10*3/MM3 (ref 0–0.2)
BASOPHILS NFR BLD AUTO: 0.4 % (ref 0–1.5)
BUN SERPL-MCNC: 37 MG/DL (ref 8–23)
BUN/CREAT SERPL: 11.2 (ref 7–25)
CALCIUM SPEC-SCNC: 8.5 MG/DL (ref 8.6–10.5)
CHLORIDE SERPL-SCNC: 106 MMOL/L (ref 98–107)
CO2 SERPL-SCNC: 25.5 MMOL/L (ref 22–29)
CREAT SERPL-MCNC: 3.31 MG/DL (ref 0.76–1.27)
DEPRECATED RDW RBC AUTO: 47.3 FL (ref 37–54)
EGFRCR SERPLBLD CKD-EPI 2021: 18.7 ML/MIN/1.73
EOSINOPHIL # BLD AUTO: 0.24 10*3/MM3 (ref 0–0.4)
EOSINOPHIL NFR BLD AUTO: 2.9 % (ref 0.3–6.2)
ERYTHROCYTE [DISTWIDTH] IN BLOOD BY AUTOMATED COUNT: 13.1 % (ref 12.3–15.4)
GLUCOSE BLDC GLUCOMTR-MCNC: 158 MG/DL (ref 70–130)
GLUCOSE BLDC GLUCOMTR-MCNC: 187 MG/DL (ref 70–130)
GLUCOSE BLDC GLUCOMTR-MCNC: 206 MG/DL (ref 70–130)
GLUCOSE BLDC GLUCOMTR-MCNC: 242 MG/DL (ref 70–130)
GLUCOSE SERPL-MCNC: 214 MG/DL (ref 65–99)
HCT VFR BLD AUTO: 32.1 % (ref 37.5–51)
HGB BLD-MCNC: 10.2 G/DL (ref 13–17.7)
IMM GRANULOCYTES # BLD AUTO: 0.04 10*3/MM3 (ref 0–0.05)
IMM GRANULOCYTES NFR BLD AUTO: 0.5 % (ref 0–0.5)
LYMPHOCYTES # BLD AUTO: 0.99 10*3/MM3 (ref 0.7–3.1)
LYMPHOCYTES NFR BLD AUTO: 11.9 % (ref 19.6–45.3)
MCH RBC QN AUTO: 31.3 PG (ref 26.6–33)
MCHC RBC AUTO-ENTMCNC: 31.8 G/DL (ref 31.5–35.7)
MCV RBC AUTO: 98.5 FL (ref 79–97)
MONOCYTES # BLD AUTO: 0.58 10*3/MM3 (ref 0.1–0.9)
MONOCYTES NFR BLD AUTO: 7 % (ref 5–12)
NEUTROPHILS NFR BLD AUTO: 6.41 10*3/MM3 (ref 1.7–7)
NEUTROPHILS NFR BLD AUTO: 77.3 % (ref 42.7–76)
NRBC BLD AUTO-RTO: 0 /100 WBC (ref 0–0.2)
PLATELET # BLD AUTO: 229 10*3/MM3 (ref 140–450)
PMV BLD AUTO: 10 FL (ref 6–12)
POTASSIUM SERPL-SCNC: 4.6 MMOL/L (ref 3.5–5.2)
RBC # BLD AUTO: 3.26 10*6/MM3 (ref 4.14–5.8)
SODIUM SERPL-SCNC: 139 MMOL/L (ref 136–145)
WBC NRBC COR # BLD AUTO: 8.29 10*3/MM3 (ref 3.4–10.8)

## 2024-06-22 PROCEDURE — 85025 COMPLETE CBC W/AUTO DIFF WBC: CPT | Performed by: HOSPITALIST

## 2024-06-22 PROCEDURE — 94799 UNLISTED PULMONARY SVC/PX: CPT

## 2024-06-22 PROCEDURE — 25010000002 HEPARIN (PORCINE) 25000-0.45 UT/250ML-% SOLUTION: Performed by: INTERNAL MEDICINE

## 2024-06-22 PROCEDURE — 82948 REAGENT STRIP/BLOOD GLUCOSE: CPT

## 2024-06-22 PROCEDURE — 94760 N-INVAS EAR/PLS OXIMETRY 1: CPT

## 2024-06-22 PROCEDURE — 94761 N-INVAS EAR/PLS OXIMETRY MLT: CPT

## 2024-06-22 PROCEDURE — 63710000001 INSULIN LISPRO (HUMAN) PER 5 UNITS: Performed by: INTERNAL MEDICINE

## 2024-06-22 PROCEDURE — 94664 DEMO&/EVAL PT USE INHALER: CPT

## 2024-06-22 PROCEDURE — 25010000002 BUMETANIDE PER 0.5 MG: Performed by: INTERNAL MEDICINE

## 2024-06-22 PROCEDURE — 80048 BASIC METABOLIC PNL TOTAL CA: CPT

## 2024-06-22 PROCEDURE — 85730 THROMBOPLASTIN TIME PARTIAL: CPT | Performed by: INTERNAL MEDICINE

## 2024-06-22 RX ORDER — POLYETHYLENE GLYCOL 3350 17 G/17G
17 POWDER, FOR SOLUTION ORAL 2 TIMES DAILY
Status: DISCONTINUED | OUTPATIENT
Start: 2024-06-22 | End: 2024-06-25 | Stop reason: HOSPADM

## 2024-06-22 RX ORDER — BUMETANIDE 0.25 MG/ML
1 INJECTION INTRAMUSCULAR; INTRAVENOUS ONCE
Qty: 4 ML | Refills: 0 | Status: COMPLETED | OUTPATIENT
Start: 2024-06-22 | End: 2024-06-22

## 2024-06-22 RX ADMIN — IPRATROPIUM BROMIDE AND ALBUTEROL SULFATE 3 ML: .5; 3 SOLUTION RESPIRATORY (INHALATION) at 18:46

## 2024-06-22 RX ADMIN — METOPROLOL TARTRATE 12.5 MG: 25 TABLET, FILM COATED ORAL at 22:33

## 2024-06-22 RX ADMIN — POLYETHYLENE GLYCOL 3350 17 G: 17 POWDER, FOR SOLUTION ORAL at 22:33

## 2024-06-22 RX ADMIN — BRIMONIDINE TARTRATE 1 DROP: 2 SOLUTION OPHTHALMIC at 09:02

## 2024-06-22 RX ADMIN — DULOXETINE HYDROCHLORIDE 30 MG: 30 CAPSULE, DELAYED RELEASE ORAL at 22:33

## 2024-06-22 RX ADMIN — Medication 100 MG: at 09:00

## 2024-06-22 RX ADMIN — INSULIN LISPRO 4 UNITS: 100 INJECTION, SOLUTION INTRAVENOUS; SUBCUTANEOUS at 12:05

## 2024-06-22 RX ADMIN — TERAZOSIN HYDROCHLORIDE 1 MG: 1 CAPSULE ORAL at 22:33

## 2024-06-22 RX ADMIN — AMIODARONE HYDROCHLORIDE 200 MG: 200 TABLET ORAL at 09:00

## 2024-06-22 RX ADMIN — HEPARIN SODIUM 16.4 UNITS/KG/HR: 10000 INJECTION, SOLUTION INTRAVENOUS at 22:41

## 2024-06-22 RX ADMIN — HEPARIN SODIUM 16.4 UNITS/KG/HR: 10000 INJECTION, SOLUTION INTRAVENOUS at 07:12

## 2024-06-22 RX ADMIN — IPRATROPIUM BROMIDE AND ALBUTEROL SULFATE 3 ML: .5; 3 SOLUTION RESPIRATORY (INHALATION) at 10:35

## 2024-06-22 RX ADMIN — OLANZAPINE 5 MG: 5 TABLET, FILM COATED ORAL at 22:33

## 2024-06-22 RX ADMIN — BRIMONIDINE TARTRATE 1 DROP: 2 SOLUTION OPHTHALMIC at 22:33

## 2024-06-22 RX ADMIN — Medication 1 APPLICATION: at 22:33

## 2024-06-22 RX ADMIN — INSULIN LISPRO 2 UNITS: 100 INJECTION, SOLUTION INTRAVENOUS; SUBCUTANEOUS at 18:13

## 2024-06-22 RX ADMIN — Medication 10 ML: at 09:01

## 2024-06-22 RX ADMIN — GUAIFENESIN 600 MG: 600 TABLET, EXTENDED RELEASE ORAL at 09:00

## 2024-06-22 RX ADMIN — SENNOSIDES AND DOCUSATE SODIUM 2 TABLET: 50; 8.6 TABLET ORAL at 09:00

## 2024-06-22 RX ADMIN — Medication 4 ML: at 18:46

## 2024-06-22 RX ADMIN — LATANOPROST 1 DROP: 50 SOLUTION OPHTHALMIC at 22:33

## 2024-06-22 RX ADMIN — IPRATROPIUM BROMIDE AND ALBUTEROL SULFATE 3 ML: .5; 3 SOLUTION RESPIRATORY (INHALATION) at 06:25

## 2024-06-22 RX ADMIN — BUMETANIDE 1 MG: 1 TABLET ORAL at 09:00

## 2024-06-22 RX ADMIN — Medication 10 ML: at 22:34

## 2024-06-22 RX ADMIN — METOPROLOL TARTRATE 12.5 MG: 25 TABLET, FILM COATED ORAL at 08:59

## 2024-06-22 RX ADMIN — GUAIFENESIN 600 MG: 600 TABLET, EXTENDED RELEASE ORAL at 22:33

## 2024-06-22 RX ADMIN — TRAMADOL HYDROCHLORIDE 50 MG: 50 TABLET ORAL at 22:33

## 2024-06-22 RX ADMIN — Medication 1 APPLICATION: at 09:01

## 2024-06-22 RX ADMIN — Medication 4 ML: at 06:26

## 2024-06-22 RX ADMIN — BUMETANIDE 1 MG: 0.25 INJECTION INTRAMUSCULAR; INTRAVENOUS at 12:06

## 2024-06-22 RX ADMIN — BUMETANIDE 1 MG: 1 TABLET ORAL at 18:13

## 2024-06-22 RX ADMIN — Medication 10 ML: at 22:35

## 2024-06-22 RX ADMIN — POLYETHYLENE GLYCOL 3350 17 G: 17 POWDER, FOR SOLUTION ORAL at 08:58

## 2024-06-22 RX ADMIN — SENNOSIDES AND DOCUSATE SODIUM 2 TABLET: 50; 8.6 TABLET ORAL at 22:33

## 2024-06-22 RX ADMIN — ASPIRIN 81 MG: 81 TABLET, CHEWABLE ORAL at 09:00

## 2024-06-22 NOTE — PROGRESS NOTES
PROGRESS NOTE      Patient Name: Dilip Verma  : 1950  MRN: [id number]  Primary Care Physician: Fernando Camargo DO  Date of admission: 2024    Patient Care Team:  Fernando Camargo DO as PCP - General (Family Medicine)  Morris Cai MD as Consulting Physician (Sleep Medicine)  Michaela Hylton MD as Consulting Physician (Cardiology)  Hardy Banerjee MD as Consulting Physician (Ophthalmology)  Chula Christianson MD as Consulting Physician (Ophthalmology)  Jason Sherman MD (Endocrinology)  Perla Mayen MD as Consulting Physician (Nephrology)  Federico Hebert MD as Consulting Physician (Pulmonary Disease)  Niraj Ravi MD as Consulting Physician (Orthopedic Surgery)  Papa Velasco MD as Consulting Physician (Urology)  Terese Yost MD as Consulting Physician (Gastroenterology)  Aracelis Ball MD as Consulting Physician (Colon and Rectal Surgery)  [facility] as Primary Care Provider        Reason for Follow up:     AMENA, CKD III      Subjective:     Patient seen and examined, more awake, still  confused  S/p dialysis 24    Review of Systems:         Constitutional: weakness.  HEENT:  No headache, otalgia, itchy eyes, nasal discharge or sore throat.  Cardiac:  No chest pain, dyspnea, orthopnea or PND.  Chest:  No cough, phlegm or wheezing.  Abdomen:  No abdominal pain, nausea or vomiting.  Neuro:  No focal weakness, abnormal movements or seizure-like activity.  :   No hematuria, no pyuria, no dysuria, no flank pain.  Extremities:  No  joint pains.  ROS was otherwise negative except as mentioned in the [place].    Social History:  reports that he quit smoking about 24 years ago. His smoking use included cigarettes. He started smoking about 40 years ago. He has a 12 pack-year smoking history. He has been exposed to tobacco smoke. He has never used smokeless tobacco. He reports that he does not currently use alcohol. He reports that he does  not use drugs.    Medications:  Prior to Admission medications    Medication Sig Start Date End Date Taking? Authorizing Provider   aspirin 81 MG EC tablet Take 1 tablet by mouth Every Night.   Yes Heri Rinaldi MD   brimonidine (ALPHAGAN) 0.2 % ophthalmic solution Administer 1 drop to both eyes 2 (Two) Times a Day.   Yes Heri Rinaldi MD   bumetanide (BUMEX) 1 MG tablet Take 1 tablet by mouth Daily.   Yes Heri Rinaldi MD   Cholecalciferol (Vitamin D-3) 125 MCG (5000 UT) tablet Take 1 tablet by mouth Daily.   Yes Heri Rinaldi MD   DULoxetine (CYMBALTA) 30 MG capsule Take 1 capsule by mouth Every Night. 1/16/24  Yes Heri Rinaldi MD   Insulin Glargine, 2 Unit Dial, (TOUJEO) 300 UNIT/ML solution pen-injector injection Inject 24 Units under the skin into the appropriate area as directed Daily.   Yes Heri Rinaldi MD   insulin lispro (humaLOG) 100 UNIT/ML injection Inject 0-14 Units under the skin into the appropriate area as directed 3 (Three) Times a Day Before Meals.  Patient taking differently: Inject 0-14 Units under the skin into the appropriate area as directed 3 (Three) Times a Day Before Meals. SLIDING SCALE  100-150 - 4 units  151-200 - 5 units  201-250 - 6 units  251-300 - 7 units  301-350 - 8 units  351-400 - 9 units  401+ - 10 units 12/7/20  Yes Amador Valverde MD   latanoprost (XALATAN) 0.005 % ophthalmic solution Administer 1 drop to both eyes every night at bedtime. 1/16/23  Yes Heri Rinaldi MD   multivitamin with minerals (MULTIVITAMIN ADULTS PO) Take 1 tablet by mouth Daily.   Yes Heri Rinaldi MD   testosterone (ANDROGEL) 25 MG/2.5GM (1%) gel gel Place 25 mg on the skin as directed by provider 2 (Two) Times a Week.   Yes Heri Rinaldi MD   traMADol (ULTRAM) 50 MG tablet Take 1 tablet by mouth At Night As Needed. 10/16/23  Yes Heri Rinaldi MD   Magnesium 400 MG capsule Take 400 mg by mouth As Needed.    [place]  MD Heri   sildenafil (REVATIO) 20 MG tablet Take 1 tablet by mouth As Needed.    Provider, MD Heri     Scheduled Meds:amiodarone, 200 mg, Oral, Q24H  [Held by provider] apixaban, 5 mg, Oral, Q12H  aspirin, 81 mg, Oral, Daily  brimonidine, 1 drop, Both Eyes, BID  bumetanide, 1 mg, Oral, BID  DULoxetine, 30 mg, Oral, Daily  guaiFENesin, 600 mg, Oral, Q12H  insulin lispro, 2-9 Units, Subcutaneous, 4x Daily AC & at Bedtime  ipratropium-albuterol, 3 mL, Nebulization, 4x Daily - RT  latanoprost, 1 drop, Both Eyes, Nightly  Menthol-Zinc Oxide, 1 Application, Topical, Q12H  metoprolol tartrate, 12.5 mg, Oral, Q12H  OLANZapine, 5 mg, Oral, Nightly  polyethylene glycol, 17 g, Oral, Daily  senna-docusate sodium, 2 tablet, Oral, BID  sodium chloride, 10 mL, Intravenous, Q12H  sodium chloride, 10 mL, Intravenous, Q12H  sodium chloride, 4 mL, Nebulization, BID - RT  terazosin, 1 mg, Oral, Nightly  thiamine, 100 mg, Oral, Daily      Continuous Infusions:heparin, 10.4 Units/kg/hr, Last Rate: 16.4 Units/kg/hr (06/22/24 0812)          PRN Meds:  acetaminophen **OR** acetaminophen **OR** acetaminophen    senna-docusate sodium **AND** polyethylene glycol **AND** bisacodyl **AND** bisacodyl    dextrose    dextrose    glucagon (human recombinant)    heparin (porcine)    midodrine    nitroglycerin    OLANZapine    ondansetron    sodium chloride    sodium chloride    sodium chloride    sodium chloride    traMADol  Allergies:    Allergies   Allergen Reactions    Ace Inhibitors Angioedema    Angiotensin Receptor Blockers Angioedema and Unknown (See Comments)     Angioneurotic edema    Dapagliflozin Other (See Comments)     UTI,  rectal abcess    Losartan Angioedema     Angioneurotic edema    Seroquel [Quetiapine] Hallucinations    Xanax [Alprazolam] Unknown - High Severity    Amlodipine Swelling    Ativan [Lorazepam] Hallucinations    Baclofen Hallucinations    Misc. Sulfonamide Containing Compounds Unknown (See Comments)     "Minoxidil Confusion    Tetracycline Rash     bliisters in mouth         Objective   Exam:     Vital Signs  Temp:  [97.7 °F (36.5 °C)-98.8 °F (37.1 °C)] 97.7 °F (36.5 °C)  Heart Rate:  [59-69] 61  Resp:  [16-18] 18  BP: (127-178)/(68-81) 127/68  SpO2:  [91 %-99 %] 94 %  on  Flow (L/min):  [2-4] 3;   Device (Oxygen Therapy): nasal cannula;humidified  Body mass index is 30.77 kg/m².       Exam:     /68 (BP Location: Right arm, Patient Position: Lying)   Pulse 61   Temp 97.7 °F (36.5 °C) (Oral)   Resp 18   Ht 172.7 cm (68\")   Wt 91.8 kg (202 lb 6.1 oz)   SpO2 94%   BMI 30.77 kg/m²     General Appearance:    Appears chronically ill, no distress   Head:    Normocephalic, atraumatic   Eyes:     EOM's intact, sclerae anicteric        Ears:    TMs not observed   Nose:   Patent without discharge   Neck:   Supple, JVD   Lungs:     Clear to auscultation bilaterally, respiratory effort is normal   Chest wall:    No tenderness   Heart:    Regular rate and rhythm, S1 and S2 normal, no   rub    or gallop   Abdomen:     Soft, nontender, nondistended,  no masses, no organomegaly   Extremities:   No edema   Neurologic:  No focal deficits.  Speech is fluent.  Conversation is coherent.      Results Review:  I have personally reviewed most recent Data :  [facility]  CBC    Results from last 7 days   Lab Units 06/18/24  0639 06/17/24  0600 06/16/24  0546   WBC 10*3/mm3 6.79 7.06 7.97   HEMOGLOBIN g/dL 10.5* 10.1* 10.4*   PLATELETS 10*3/mm3 256 250 263     CMP   Results from last 7 days   Lab Units 06/22/24  0627 06/21/24  0732 06/20/24  0822 06/19/24  0753 06/18/24  0639 06/17/24  0559 06/16/24  0546   SODIUM mmol/L 139 142 139 139 138 137 136   POTASSIUM mmol/L 4.6 4.7 4.4 4.2 4.4 4.5 4.4   CHLORIDE mmol/L 106 106 104 105 105 105 101   CO2 mmol/L 25.5 28.0 27.0 24.0 20.6* 22.0 20.5*   BUN mg/dL 37* 40* 38* 35* 27* 53* 44*   CREATININE mg/dL 3.31* 3.45* 3.36* 3.64* 3.46* 5.23* 4.81*   GLUCOSE mg/dL 214* " 170* 195* 135* 111* 141* 159*     ABG    Results from last 7 days   Lab Units 06/15/24  1613   PH, ARTERIAL pH units 7.296*   PCO2, ARTERIAL mm Hg 48.6*   PO2 ART mm Hg 62.1*   O2 SATURATION ART % 88.5*   BASE EXCESS ART mmol/L -3.0*       XR Chest PA & Lateral    Result Date: 6/21/2024   1. Stable right IJ central venous catheter. 2. Low lung volumes with similar-appearing left greater than right bilateral perihilar and bibasilar opacities that could represent atelectasis and/or pneumonia with possible small pleural effusions and possible superimposed mild pulmonary vascular congestion/pulmonary edema.  This report was finalized on 6/21/2024 1:05 PM by Julio Thomas MD on Workstation: [id number]      XR Chest Post CVA Port    Result Date: 6/15/2024  1. Since yesterday's portable chest x-ray on 6/14/2024 at 2:15 p.m., there is been interval placement of a right internal jugular central line-Shiley catheter and its distal tip projects over the superior vena cava in good position and no pneumothorax is seen. Otherwise, there has been no change when compared to yesterday's chest x-ray 6/14/2024 at 2:15 p.m.  2. The patient's had previous median sternotomy there is prominent blunting of the left lateral costophrenic angle and some hazy density over the inferior left hemithorax likely secondary to at least a small to moderate size up to moderate size left pleural effusion there is some dense airspace consolidation in the left lower lung zone left lung base likely compressive atelectasis associated with the left pleural effusion although I cannot exclude superimposed pneumonia at the left lower lobe or lung base and correlate clinically. No additional active disease is seen in the chest.  This report was finalized on 6/15/2024 5:23 PM by Dr. Jaquan Mendieta M.D on Workstation: [id number]      XR Chest 1 View    Result Date: 6/14/2024  As described.    This report was finalized on 6/14/2024 2:42 PM by Dr. Gabe Zimmer,  M.D on Workstation: [id number]       Results for orders placed during the hospital encounter of 05/30/24    Adult Transthoracic Echo Complete W/ Cont if Necessary Per Protocol    Interpretation Summary    Left ventricular systolic function is hyperdynamic (EF > 70%). Calculated left ventricular EF = 75% Global longitudinal LV strain (GLS) = -17.8%. Left ventricle strain data was reviewed by the physician and found to be accurate. Global longitudinal LV strain is normal however there is regional abnormality with apical sparing suggestive of amyloid heart disease. However there is also basal ptal hypertrophy suggestive of hypertrophic cardiomyopathy. Wall motion abnormality is also noted in the basal septum and cannot rule out ischemic component.Consider additional imaging such as cardiac MRI and further evaluation also for amyloid heart disease as clinically indicated. The left ventricular cavity is small in size. Left ventricular wall thickness is consistent with moderate to severe septal asymmetric hypertrophy. There is hypokinesis of the left ventricular basal septum. Left ventricular diastolic function is consistent with (grade II w/high LAP) pseudonormalization.    The left atrial cavity is mildly dilated.    No aortic valve regurgitation or stenosis is present. The aortic valve is abnormal in structure. There is mild thickening of the aortic valve.    There is mild, bileaflet mitral valve thickening present. Trace mitral valve regurgitation is present. No significant mitral valve stenosis is present.    Moderate tricuspid valve regurgitation is present. Estimated right ventricular systolic pressure from tricuspid regurgitation is markedly elevated (>55 mmHg). Calculated right ventricular systolic pressure from tricuspid regurgitation is 74 mmHg.        Assessment & Plan   Assessment and Plan:         Acute UTI (urinary tract infection)    Type 2 diabetes mellitus with hyperglycemia, with long-term current use of  insulin    CAD (coronary artery disease)    Hx of CABG    Chronic diastolic heart failure    CSA (central sleep apnea)    HTN (hypertension)    History of stroke    CKD (chronic kidney disease) stage 3, GFR 30-59 ml/min    Peripheral arterial disease    Sepsis    Hyperkalemia    Metabolic encephalopathy    AMENA (acute kidney injury)    Paroxysmal atrial fibrillation    ASSESSMENT:  AMENA likely prerenal ATN 2/2 urosepsis with hemodynamic changes; on CKD III etiology likely diabetic and hypertensive nephropathy with baseline sCr ~0.9-1.2 mg/dL  Hyperkalemia, now resolved  UTI, urine cultures with gram neg bacilli  Leukocytosis  Metabolic encephalopathy  PAD  Hx stroke  HTN  CHF  CAD  DM2  Chronic splenic vein thrombosis  Possible hepatocellular disease/cirrhosis  Likely benign subpleural nodule with bilateral lymphadenopathy  Hiatal hernia    Last TTE 10/5/22 with EF 66%, grade II DD    PLAN :     AMENA likely prerenal ATN 2/2 urosepsis with hemodynamic changes; on CKD III etiology likely diabetic and hypertensive nephropathy with baseline sCr ~0.9-1.2 mg/dL  Initiated on dialysis  6/15 due to worsening renal function.   HD performed on 6/17/24  Renal fx seems getting better  Keep shiley over the weekend and possible remove on Monday.   Patient is more alert oriented and clinically patient getting better  Patient creatinine slightly higher than yesterday  Paraproteinemia workup unremarkable  Likely related to some cardiorenal syndrome as echocardiogram consistent with possible amyloidosis with grade 2 diastolic dysfunctions  Continue bumex 1 mg po bid  Will give extra dose of bumex 1 mg iv  Improvement in creatinine since yesterday and with good urine output I think ATN is getting better      Discontinued Hopkins catheter  May need further workup and treatment for cardiac amyloidosis  Avoid NSAIDs, nephrotoxic agents.   We will follow and coordinate with team  Monitor closely for renal recovery    MD Urvashi Tsang  [facility]

## 2024-06-22 NOTE — PLAN OF CARE
Problem: Adult Inpatient Plan of Care  Goal: Plan of Care Review  Flowsheets (Taken 6/22/2024 0506)  Outcome Evaluation: AOX3. 3 L nasal cannula. Dressing to dialysis catheter changed, c/d/i. Heparin gtt infusing, therapeutic this AM, recheck PTT tomorrow AM. Q2 turns. Wound care per orders. Bed alarm on, spouse at bedside, call light within reach.   Goal Outcome Evaluation:              Outcome Evaluation: AOX3. 3 L nasal cannula. Dressing to dialysis catheter changed, c/d/i. Heparin gtt infusing, therapeutic this AM, recheck PTT tomorrow AM. Q2 turns. Wound care per orders. Bed alarm on, spouse at bedside, call light within reach.

## 2024-06-22 NOTE — PLAN OF CARE
Problem: Adult Inpatient Plan of Care  Goal: Plan of Care Review  Flowsheets (Taken 6/22/2024 0413)  Progress: improving  Plan of Care Reviewed With:   patient   spouse  Outcome Evaluation: No acute changes. Pt AOx2, to self and place. Disoriented to time and siuation. Episodes of word salad. No c/o pain or discomfort. Makes needs known. Q2hr turns. Heparin drip running per orders. Pills whole. Bed locked and in lowest position. Side rails up x2. Call light within reach. Will continue to assess.  Goal: Absence of Hospital-Acquired Illness or Injury  Intervention: Identify and Manage Fall Risk  Flowsheets  Taken 6/22/2024 0403  Safety Promotion/Fall Prevention:   activity supervised   assistive device/personal items within reach   clutter free environment maintained   fall prevention program maintained   lighting adjusted   nonskid shoes/slippers when out of bed   room organization consistent   safety round/check completed  Taken 6/22/2024 0219  Safety Promotion/Fall Prevention:   activity supervised   assistive device/personal items within reach   clutter free environment maintained   fall prevention program maintained   lighting adjusted   nonskid shoes/slippers when out of bed   room organization consistent   safety round/check completed  Taken 6/22/2024 0016  Safety Promotion/Fall Prevention:   activity supervised   clutter free environment maintained   assistive device/personal items within reach   fall prevention program maintained   lighting adjusted   nonskid shoes/slippers when out of bed   room organization consistent   safety round/check completed  Taken 6/21/2024 2233  Safety Promotion/Fall Prevention:   activity supervised   assistive device/personal items within reach   clutter free environment maintained   fall prevention program maintained   lighting adjusted   nonskid shoes/slippers when out of bed   safety round/check completed   room organization consistent  Taken 6/21/2024 2013  Safety  Promotion/Fall Prevention:   activity supervised   assistive device/personal items within reach   clutter free environment maintained   fall prevention program maintained   lighting adjusted   nonskid shoes/slippers when out of bed   room organization consistent   safety round/check completed  Intervention: Prevent Skin Injury  Flowsheets  Taken 6/22/2024 0403  Body Position:   tilted   right  Taken 6/22/2024 0219  Body Position:   tilted   left  Taken 6/22/2024 0016  Body Position: supine  Skin Protection:   adhesive use limited   incontinence pads utilized   tubing/devices free from skin contact   transparent dressing maintained  Taken 6/21/2024 2233  Body Position:   tilted   right  Taken 6/21/2024 2013  Body Position: supine  Intervention: Prevent and Manage VTE (Venous Thromboembolism) Risk  Flowsheets  Taken 6/22/2024 0403 by Murphy Herndon, RN  Activity Management: activity encouraged  Taken 6/22/2024 0219 by Murphy Herndon, LUC  Activity Management: activity encouraged  Taken 6/22/2024 0016 by Murphy Herndon, RN  Activity Management: activity encouraged  Taken 6/21/2024 2233 by Murphy Herndon RN  Activity Management: activity encouraged  Taken 6/21/2024 2013 by Murphy Herndon RN  Activity Management: activity encouraged  Taken 6/21/2024 0200 by Michaela Cameron RN  VTE Prevention/Management: (heparin gtt) other (see comments)  Taken 6/20/2024 0915 by Aundrea Andrews, RN  Range of Motion: active ROM (range of motion) encouraged  Intervention: Prevent Infection  Flowsheets (Taken 6/21/2024 0800 by Vannesa Finnegan, RN)  Infection Prevention: environmental surveillance performed  Goal: Optimal Comfort and Wellbeing  Intervention: Monitor Pain and Promote Comfort  Flowsheets (Taken 6/20/2024 0444 by Lia Medina, RN)  Pain Management Interventions:   see MAR   pillow support provided   position adjusted  Intervention: Provide Person-Centered Care  Flowsheets  Taken 6/22/2024 0016  Trust Relationship/Rapport:   care explained    choices provided   emotional support provided   empathic listening provided   questions answered   questions encouraged   reassurance provided   thoughts/feelings acknowledged  Taken 6/21/2024 2013  Trust Relationship/Rapport:   care explained   choices provided   empathic listening provided   emotional support provided   questions answered   reassurance provided   questions encouraged   thoughts/feelings acknowledged  Goal: Readiness for Transition of Care  Intervention: Mutually Develop Transition Plan  Flowsheets (Taken 5/30/2024 1344 by Nabila Ramsey RN)  Equipment Needed After Discharge: none  Equipment Currently Used at Home:   shower chair   rollator   oxygen   ramp   wheelchair   lift device   grab bar   glucometer   commode   bipap  Anticipated Changes Related to Illness: inability to care for self  Transportation Anticipated: family or friend will provide  Concerns to be Addressed: adjustment to diagnosis/illness  Readmission Within the Last 30 Days: no previous admission in last 30 days  Patient/Family Anticipated Services at Transition:   skilled nursing   home health care  Patient/Family Anticipates Transition to:   home with family   home with help/services   inpatient rehabilitation facility     Problem: Diabetes Comorbidity  Goal: Blood Glucose Level Within Targeted Range  Intervention: Monitor and Manage Glycemia  Flowsheets (Taken 6/22/2024 0016)  Glycemic Management: blood glucose monitored     Problem: Heart Failure Comorbidity  Goal: Maintenance of Heart Failure Symptom Control  Intervention: Maintain Heart Failure-Management  Flowsheets (Taken 6/21/2024 2233)  Medication Review/Management: medications reviewed     Problem: Hypertension Comorbidity  Goal: Blood Pressure in Desired Range  Intervention: Maintain Blood Pressure Management  Flowsheets (Taken 6/21/2024 2233)  Medication Review/Management: medications reviewed     Problem: Skin Injury Risk Increased  Goal: Skin Health and  Integrity  Intervention: Promote and Optimize Oral Intake  Flowsheets (Taken 6/22/2024 0016)  Oral Nutrition Promotion:   medicated   rest periods promoted  Intervention: Optimize Skin Protection  Flowsheets  Taken 6/22/2024 0403  Head of Bed (HOB) Positioning: HOB elevated  Taken 6/22/2024 0219  Head of Bed (HOB) Positioning: HOB elevated  Taken 6/22/2024 0016  Pressure Reduction Techniques:   frequent weight shift encouraged   weight shift assistance provided  Head of Bed (HOB) Positioning: HOB elevated  Pressure Reduction Devices: alternating pressure pump (ADD)  Skin Protection:   adhesive use limited   incontinence pads utilized   tubing/devices free from skin contact   transparent dressing maintained  Taken 6/21/2024 2233  Head of Bed (HOB) Positioning: HOB elevated  Taken 6/21/2024 2013  Head of Bed (HOB) Positioning: HOB elevated     Problem: Fall Injury Risk  Goal: Absence of Fall and Fall-Related Injury  Intervention: Identify and Manage Contributors  Flowsheets  Taken 6/21/2024 2233 by Murphy Herndon RN  Medication Review/Management: medications reviewed  Taken 6/21/2024 0800 by Vannesa Finnegan, RN  Self-Care Promotion: independence encouraged  Intervention: Promote Injury-Free Environment  Flowsheets  Taken 6/22/2024 0403  Safety Promotion/Fall Prevention:   activity supervised   assistive device/personal items within reach   clutter free environment maintained   fall prevention program maintained   lighting adjusted   nonskid shoes/slippers when out of bed   room organization consistent   safety round/check completed  Taken 6/22/2024 0219  Safety Promotion/Fall Prevention:   activity supervised   assistive device/personal items within reach   clutter free environment maintained   fall prevention program maintained   lighting adjusted   nonskid shoes/slippers when out of bed   room organization consistent   safety round/check completed  Taken 6/22/2024 0016  Safety Promotion/Fall Prevention:   activity  supervised   clutter free environment maintained   assistive device/personal items within reach   fall prevention program maintained   lighting adjusted   nonskid shoes/slippers when out of bed   room organization consistent   safety round/check completed  Taken 6/21/2024 2233  Safety Promotion/Fall Prevention:   activity supervised   assistive device/personal items within reach   clutter free environment maintained   fall prevention program maintained   lighting adjusted   nonskid shoes/slippers when out of bed   safety round/check completed   room organization consistent  Taken 6/21/2024 2013  Safety Promotion/Fall Prevention:   activity supervised   assistive device/personal items within reach   clutter free environment maintained   fall prevention program maintained   lighting adjusted   nonskid shoes/slippers when out of bed   room organization consistent   safety round/check completed     Problem: Restraint, Nonviolent  Goal: Absence of Harm or Injury  Intervention: Implement Least Restrictive Safety Strategies  Recent Flowsheet Documentation  Taken 6/22/2024 0016 by Murphy Herndon RN  Diversional Activities: television  Taken 6/21/2024 2013 by Murphy Herndon RN  Diversional Activities: television  Intervention: Protect Dignity, Rights, and Personal Wellbeing  Recent Flowsheet Documentation  Taken 6/22/2024 0016 by Murphy Herndon RN  Trust Relationship/Rapport:   care explained   choices provided   emotional support provided   empathic listening provided   questions answered   questions encouraged   reassurance provided   thoughts/feelings acknowledged  Taken 6/21/2024 2013 by Murphy Herndon RN  Trust Relationship/Rapport:   care explained   choices provided   empathic listening provided   emotional support provided   questions answered   reassurance provided   questions encouraged   thoughts/feelings acknowledged  Intervention: Protect Skin and Joint Integrity  Recent Flowsheet Documentation  Taken 6/22/2024 0403 by  Murphy Herndon RN  Body Position:   tilted   right  Taken 6/22/2024 0219 by Murphy Herndon RN  Body Position:   tilted   left  Taken 6/22/2024 0016 by Murphy Herndon RN  Body Position: supine  Taken 6/21/2024 2233 by Murphy Herndon RN  Body Position:   tilted   right  Taken 6/21/2024 2013 by Murphy Herndon RN  Body Position: supine   Goal Outcome Evaluation:  Plan of Care Reviewed With: patient, spouse        Progress: improving  Outcome Evaluation: No acute changes. Pt AOx2, to self and place. Disoriented to time and siuation. Episodes of word salad. No c/o pain or discomfort. Makes needs known. Q2hr turns. Heparin drip running per orders. Pills whole. Bed locked and in lowest position. Side rails up x2. Call light within reach. Will continue to assess.

## 2024-06-22 NOTE — PROGRESS NOTES
[facility]     LOS: 23 days     Subjective:    CC:Altered Mental Status    DIET:  Diet Order   Procedures    Diet: Regular/House, Diabetic, Cardiac; Healthy Heart (2-3 Na+); Consistent Carbohydrate; Texture: Regular (IDDSI 7); Fluid Consistency: Thin (IDDSI 0)     Sleeping during most of the exam today, wakes up says a word or 2 today.  But his wife states mental state was normal earlier today as she stays with him.     Objective:    Vital Signs:  Temp:  [97.7 °F (36.5 °C)-98.8 °F (37.1 °C)] 97.7 °F (36.5 °C)  Heart Rate:  [55-69] 55  Resp:  [16-18] 18  BP: (127-178)/(68-81) 127/68    SpO2:  [91 %-99 %] 95 %  on  Flow (L/min):  [2-3] 3;   Device (Oxygen Therapy): humidified;nasal cannula  Body mass index is 30.77 kg/m².    Physical Exam  Constitutional:       Comments: Sleeping but awakens   HENT:      Head: Normocephalic and atraumatic.   Cardiovascular:      Rate and Rhythm: Normal rate and regular rhythm.      Heart sounds: No murmur heard.     No friction rub.   Pulmonary:      Effort: Pulmonary effort is normal.      Breath sounds: Normal breath sounds.   Abdominal:      General: Bowel sounds are normal. There is no distension.      Palpations: Abdomen is soft.      Tenderness: There is no abdominal tenderness.   Musculoskeletal:      Comments: Right arm   Skin:     General: Skin is warm and dry.         Results Review:    Glucose   Date Value Ref Range Status   06/22/2024 214 (H) 65 - 99 mg/dL Final   06/21/2024 170 (H) 65 - 99 mg/dL Final   06/20/2024 195 (H) 65 - 99 mg/dL Final     Results from last 7 days   Lab Units 06/18/24  0639   WBC 10*3/mm3 6.79   HEMOGLOBIN g/dL 10.5*   HEMATOCRIT % 31.9*   PLATELETS 10*3/mm3 256     Results from last 7 days   Lab Units 06/22/24  0627   SODIUM mmol/L 139   POTASSIUM mmol/L 4.6   CHLORIDE mmol/L 106   CO2 mmol/L 25.5   BUN mg/dL 37*   CREATININE mg/dL 3.31*   CALCIUM mg/dL 8.5*   GLUCOSE mg/dL 214*     Results from last 7 days   Lab Units  "06/22/24  0626   APTT seconds 82.2*             Cultures:  No results found for: \"BLOODCX\", \"URINECX\", \"WOUNDCX\", \"MRSACX\", \"RESPCX\", \"STOOLCX\"    I have reviewed daily medications and changes in CPOE    Scheduled meds  amiodarone, 200 mg, Oral, Q24H  [Held by provider] apixaban, 5 mg, Oral, Q12H  aspirin, 81 mg, Oral, Daily  brimonidine, 1 drop, Both Eyes, BID  bumetanide, 1 mg, Intravenous, Once  bumetanide, 1 mg, Oral, BID  DULoxetine, 30 mg, Oral, Daily  guaiFENesin, 600 mg, Oral, Q12H  insulin lispro, 2-9 Units, Subcutaneous, 4x Daily AC & at Bedtime  ipratropium-albuterol, 3 mL, Nebulization, 4x Daily - RT  latanoprost, 1 drop, Both Eyes, Nightly  Menthol-Zinc Oxide, 1 Application, Topical, Q12H  metoprolol tartrate, 12.5 mg, Oral, Q12H  OLANZapine, 5 mg, Oral, Nightly  polyethylene glycol, 17 g, Oral, Daily  senna-docusate sodium, 2 tablet, Oral, BID  sodium chloride, 10 mL, Intravenous, Q12H  sodium chloride, 10 mL, Intravenous, Q12H  sodium chloride, 4 mL, Nebulization, BID - RT  terazosin, 1 mg, Oral, Nightly  thiamine, 100 mg, Oral, Daily        heparin, 10.4 Units/kg/hr, Last Rate: 16.4 Units/kg/hr (06/22/24 0812)      PRN meds    acetaminophen **OR** acetaminophen **OR** acetaminophen    senna-docusate sodium **AND** polyethylene glycol **AND** bisacodyl **AND** bisacodyl    dextrose    dextrose    glucagon (human recombinant)    heparin (porcine)    midodrine    nitroglycerin    OLANZapine    ondansetron    sodium chloride    sodium chloride    sodium chloride    sodium chloride    traMADol        Acute UTI (urinary tract infection)    Type 2 diabetes mellitus with hyperglycemia, with long-term current use of insulin    CAD (coronary artery disease)    Hx of CABG    Chronic diastolic heart failure    CSA (central sleep apnea)    HTN (hypertension)    History of stroke    CKD (chronic kidney disease) stage 3, GFR 30-59 ml/min    Peripheral arterial disease    Sepsis    Hyperkalemia    Metabolic " encephalopathy    AMENA (acute kidney injury)    Paroxysmal atrial fibrillation        Assessment/Plan:    74 y.o. male admitted with Acute UTI (urinary tract infection).     Assessment and plan  Acute UTI/LLE Cellulitis  - urine culture grew Serratia  - completed 9d of abx-cefepime was discontinued 6/8 due to high suspicion of this causing toxic encephalopathy.  -Per ID:Despite the fact that he is negative MRSA screen his right arm-continue with IV vancomycin for phlebitis and secondary cellulitis of the left arm as it is showing improvement.  Continue antibiotic treatment for 7 days with continued local care elevation and monitoring and local care of the scabbed wound on the medial aspect of the midforearm.      HTNCAD/PAD/New Onset Afib (PAF)/Chronic Diastolic CHF  - BP acceptable, no anginal symptoms, in NSR  - continue on current regimen  - heparin gtt, hold on eliquis until we know for sure he does not need a tunnel cath     Type 2 DM  - had hypoglycemia, improved off of lantus  - continue D5W, getting thiamine also  - continue coverage with ssi/hypoglycemia protocol     AMENA  -Nephrology on board, follow management recommendations.  -Patient slightly volume overloaded and requiring IV diuretic     Hypoxia  - CXR noted, improved after additional dose of bumex  - monitor and encourage pulmonary toilet as able     Delirium/Toxic Encephalopathy  - continue on scheduled and PRN zyprexa  - he is current with palliatus at home and takes PRN tramadol, this has been restarted     Hypokalemia  - replace K+     BP is good holding midodrine  Still on oxygen slightly increased amount to 3 liters (he has oxygen at night at home)  Still on heparin and change to eliquis when we have final plans about dialysis  Creatinine is stable    Que Shah MD  06/22/24  11:04 EDT

## 2024-06-23 LAB
ANION GAP SERPL CALCULATED.3IONS-SCNC: 8 MMOL/L (ref 5–15)
APTT PPP: 84.7 SECONDS (ref 22.7–35.4)
BUN SERPL-MCNC: 41 MG/DL (ref 8–23)
BUN/CREAT SERPL: 13.9 (ref 7–25)
CALCIUM SPEC-SCNC: 8.4 MG/DL (ref 8.6–10.5)
CHLORIDE SERPL-SCNC: 103 MMOL/L (ref 98–107)
CO2 SERPL-SCNC: 29 MMOL/L (ref 22–29)
CREAT SERPL-MCNC: 2.95 MG/DL (ref 0.76–1.27)
EGFRCR SERPLBLD CKD-EPI 2021: 21.4 ML/MIN/1.73
GLUCOSE BLDC GLUCOMTR-MCNC: 129 MG/DL (ref 70–130)
GLUCOSE BLDC GLUCOMTR-MCNC: 157 MG/DL (ref 70–130)
GLUCOSE BLDC GLUCOMTR-MCNC: 176 MG/DL (ref 70–130)
GLUCOSE BLDC GLUCOMTR-MCNC: 202 MG/DL (ref 70–130)
GLUCOSE SERPL-MCNC: 133 MG/DL (ref 65–99)
POTASSIUM SERPL-SCNC: 4.7 MMOL/L (ref 3.5–5.2)
SODIUM SERPL-SCNC: 140 MMOL/L (ref 136–145)

## 2024-06-23 PROCEDURE — 94799 UNLISTED PULMONARY SVC/PX: CPT

## 2024-06-23 PROCEDURE — 94760 N-INVAS EAR/PLS OXIMETRY 1: CPT

## 2024-06-23 PROCEDURE — 25010000002 HEPARIN (PORCINE) 25000-0.45 UT/250ML-% SOLUTION: Performed by: INTERNAL MEDICINE

## 2024-06-23 PROCEDURE — 94761 N-INVAS EAR/PLS OXIMETRY MLT: CPT

## 2024-06-23 PROCEDURE — 63710000001 INSULIN LISPRO (HUMAN) PER 5 UNITS: Performed by: INTERNAL MEDICINE

## 2024-06-23 PROCEDURE — 80048 BASIC METABOLIC PNL TOTAL CA: CPT

## 2024-06-23 PROCEDURE — 85730 THROMBOPLASTIN TIME PARTIAL: CPT | Performed by: INTERNAL MEDICINE

## 2024-06-23 PROCEDURE — 94664 DEMO&/EVAL PT USE INHALER: CPT

## 2024-06-23 PROCEDURE — 82948 REAGENT STRIP/BLOOD GLUCOSE: CPT

## 2024-06-23 RX ADMIN — IPRATROPIUM BROMIDE AND ALBUTEROL SULFATE 3 ML: .5; 3 SOLUTION RESPIRATORY (INHALATION) at 11:17

## 2024-06-23 RX ADMIN — Medication 10 ML: at 22:53

## 2024-06-23 RX ADMIN — AMIODARONE HYDROCHLORIDE 200 MG: 200 TABLET ORAL at 09:16

## 2024-06-23 RX ADMIN — BUMETANIDE 1 MG: 1 TABLET ORAL at 09:16

## 2024-06-23 RX ADMIN — GUAIFENESIN 600 MG: 600 TABLET, EXTENDED RELEASE ORAL at 22:52

## 2024-06-23 RX ADMIN — BISACODYL 5 MG: 5 TABLET, COATED ORAL at 10:25

## 2024-06-23 RX ADMIN — IPRATROPIUM BROMIDE AND ALBUTEROL SULFATE 3 ML: .5; 3 SOLUTION RESPIRATORY (INHALATION) at 20:57

## 2024-06-23 RX ADMIN — METOPROLOL TARTRATE 12.5 MG: 25 TABLET, FILM COATED ORAL at 22:52

## 2024-06-23 RX ADMIN — IPRATROPIUM BROMIDE AND ALBUTEROL SULFATE 3 ML: .5; 3 SOLUTION RESPIRATORY (INHALATION) at 07:44

## 2024-06-23 RX ADMIN — METOPROLOL TARTRATE 12.5 MG: 25 TABLET, FILM COATED ORAL at 09:16

## 2024-06-23 RX ADMIN — Medication 1 APPLICATION: at 22:06

## 2024-06-23 RX ADMIN — INSULIN LISPRO 2 UNITS: 100 INJECTION, SOLUTION INTRAVENOUS; SUBCUTANEOUS at 17:24

## 2024-06-23 RX ADMIN — LATANOPROST 1 DROP: 50 SOLUTION OPHTHALMIC at 22:55

## 2024-06-23 RX ADMIN — OLANZAPINE 5 MG: 5 TABLET, FILM COATED ORAL at 22:51

## 2024-06-23 RX ADMIN — BUMETANIDE 1 MG: 1 TABLET ORAL at 17:24

## 2024-06-23 RX ADMIN — Medication 10 ML: at 22:56

## 2024-06-23 RX ADMIN — HEPARIN SODIUM 16.4 UNITS/KG/HR: 10000 INJECTION, SOLUTION INTRAVENOUS at 16:05

## 2024-06-23 RX ADMIN — GUAIFENESIN 600 MG: 600 TABLET, EXTENDED RELEASE ORAL at 09:16

## 2024-06-23 RX ADMIN — TRAMADOL HYDROCHLORIDE 50 MG: 50 TABLET ORAL at 22:51

## 2024-06-23 RX ADMIN — BRIMONIDINE TARTRATE 1 DROP: 2 SOLUTION OPHTHALMIC at 09:17

## 2024-06-23 RX ADMIN — Medication 100 MG: at 09:16

## 2024-06-23 RX ADMIN — SENNOSIDES AND DOCUSATE SODIUM 2 TABLET: 50; 8.6 TABLET ORAL at 09:16

## 2024-06-23 RX ADMIN — Medication 4 ML: at 07:44

## 2024-06-23 RX ADMIN — ASPIRIN 81 MG: 81 TABLET, CHEWABLE ORAL at 09:16

## 2024-06-23 RX ADMIN — DULOXETINE HYDROCHLORIDE 30 MG: 30 CAPSULE, DELAYED RELEASE ORAL at 22:51

## 2024-06-23 RX ADMIN — BRIMONIDINE TARTRATE 1 DROP: 2 SOLUTION OPHTHALMIC at 22:55

## 2024-06-23 RX ADMIN — SENNOSIDES AND DOCUSATE SODIUM 2 TABLET: 50; 8.6 TABLET ORAL at 22:53

## 2024-06-23 RX ADMIN — Medication 1 APPLICATION: at 09:17

## 2024-06-23 RX ADMIN — Medication 4 ML: at 20:57

## 2024-06-23 RX ADMIN — INSULIN LISPRO 4 UNITS: 100 INJECTION, SOLUTION INTRAVENOUS; SUBCUTANEOUS at 12:14

## 2024-06-23 RX ADMIN — TERAZOSIN HYDROCHLORIDE 1 MG: 1 CAPSULE ORAL at 22:52

## 2024-06-23 RX ADMIN — Medication 10 ML: at 09:17

## 2024-06-23 NOTE — PLAN OF CARE
Problem: Adult Inpatient Plan of Care  Goal: Plan of Care Review  Flowsheets (Taken 6/23/2024 0349)  Progress: no change  Plan of Care Reviewed With:   patient   spouse  Outcome Evaluation: No acute changes. AOx2, to self and place. Wife @ bedside. Makes needs known. Heparin gtt running per orders. No c/o pain or discomfort. Bed locked and in lowest position. Side rails up x2. Call light within reach. Will continue to assess.  Goal: Absence of Hospital-Acquired Illness or Injury  Intervention: Identify and Manage Fall Risk  Flowsheets  Taken 6/23/2024 0350 by Lawanda Gaston, PCT  Safety Promotion/Fall Prevention:   safety round/check completed   assistive device/personal items within reach  Taken 6/23/2024 0201 by Murphy Herndon, RN  Safety Promotion/Fall Prevention:   activity supervised   assistive device/personal items within reach   clutter free environment maintained   fall prevention program maintained   lighting adjusted   nonskid shoes/slippers when out of bed   room organization consistent   safety round/check completed  Taken 6/23/2024 0042 by Murphy Herndon, RN  Safety Promotion/Fall Prevention:   activity supervised   assistive device/personal items within reach   clutter free environment maintained   fall prevention program maintained   lighting adjusted   nonskid shoes/slippers when out of bed   room organization consistent   safety round/check completed  Taken 6/22/2024 2247 by Murphy Herndon, RN  Safety Promotion/Fall Prevention:   activity supervised   assistive device/personal items within reach   clutter free environment maintained   fall prevention program maintained   lighting adjusted   nonskid shoes/slippers when out of bed   room organization consistent   safety round/check completed  Taken 6/22/2024 2002 by Murphy Herndon, RN  Safety Promotion/Fall Prevention:   activity supervised   assistive device/personal items within reach   clutter free environment maintained   fall prevention program  maintained   lighting adjusted   nonskid shoes/slippers when out of bed   room organization consistent   safety round/check completed  Intervention: Prevent Skin Injury  Flowsheets  Taken 6/23/2024 0201  Body Position: supine  Taken 6/23/2024 0042  Body Position:   tilted   left  Skin Protection:   adhesive use limited   incontinence pads utilized   transparent dressing maintained   tubing/devices free from skin contact  Taken 6/22/2024 2247  Body Position:   tilted   right  Taken 6/22/2024 2002  Body Position: supine  Skin Protection:   adhesive use limited   incontinence pads utilized   tubing/devices free from skin contact   transparent dressing maintained  Intervention: Prevent and Manage VTE (Venous Thromboembolism) Risk  Flowsheets  Taken 6/23/2024 0350 by Lawanda Gaston PCT  Activity Management: activity encouraged  Taken 6/23/2024 0201 by Murphy Herndon RN  Activity Management: activity encouraged  Taken 6/23/2024 0042 by Murphy Herndon RN  Activity Management: activity encouraged  Range of Motion:   active ROM (range of motion) encouraged   ROM (range of motion) performed  Taken 6/22/2024 2247 by Murphy Herndon RN  Activity Management: activity encouraged  Taken 6/22/2024 0800 by Linette French RN  VTE Prevention/Management: (Heparin gtt) other (see comments)  Intervention: Prevent Infection  Flowsheets (Taken 6/23/2024 0350 by Lawanda Gaston PCT)  Infection Prevention:   environmental surveillance performed   hand hygiene promoted   rest/sleep promoted   single patient room provided  Goal: Optimal Comfort and Wellbeing  Intervention: Monitor Pain and Promote Comfort  Flowsheets (Taken 6/20/2024 0444 by Lia Medina, RN)  Pain Management Interventions:   see MAR   pillow support provided   position adjusted  Intervention: Provide Person-Centered Care  Flowsheets  Taken 6/23/2024 0042  Trust Relationship/Rapport:   care explained   choices provided   emotional support provided   empathic listening  provided   questions answered   questions encouraged   reassurance provided   thoughts/feelings acknowledged  Taken 6/22/2024 2002  Trust Relationship/Rapport:   care explained   choices provided   emotional support provided   empathic listening provided   questions answered   questions encouraged   reassurance provided   thoughts/feelings acknowledged  Goal: Readiness for Transition of Care  Intervention: Mutually Develop Transition Plan  Flowsheets (Taken 5/30/2024 1344 by Nabila Ramsey, RN)  Equipment Needed After Discharge: none  Equipment Currently Used at Home:   shower chair   rollator   oxygen   ramp   wheelchair   lift device   grab bar   glucometer   commode   bipap  Anticipated Changes Related to Illness: inability to care for self  Transportation Anticipated: family or friend will provide  Concerns to be Addressed: adjustment to diagnosis/illness  Readmission Within the Last 30 Days: no previous admission in last 30 days  Patient/Family Anticipated Services at Transition:   skilled nursing   home health care  Patient/Family Anticipates Transition to:   home with family   home with help/services   inpatient rehabilitation facility     Problem: Diabetes Comorbidity  Goal: Blood Glucose Level Within Targeted Range  Intervention: Monitor and Manage Glycemia  Flowsheets  Taken 6/23/2024 0042  Glycemic Management: blood glucose monitored  Taken 6/22/2024 2002  Glycemic Management: blood glucose monitored     Problem: Heart Failure Comorbidity  Goal: Maintenance of Heart Failure Symptom Control  Intervention: Maintain Heart Failure-Management  Flowsheets (Taken 6/22/2024 1800 by Linette French, RN)  Medication Review/Management: medications reviewed     Problem: Hypertension Comorbidity  Goal: Blood Pressure in Desired Range  Intervention: Maintain Blood Pressure Management  Flowsheets (Taken 6/22/2024 1800 by Linette French, RN)  Medication Review/Management: medications reviewed     Problem: Skin Injury Risk  Increased  Goal: Skin Health and Integrity  Intervention: Promote and Optimize Oral Intake  Flowsheets  Taken 6/23/2024 0042  Oral Nutrition Promotion:   medicated   rest periods promoted  Taken 6/22/2024 2002  Oral Nutrition Promotion:   medicated   rest periods promoted  Intervention: Optimize Skin Protection  Flowsheets  Taken 6/23/2024 0350 by Lawanda Gaston PCT  Head of Bed (HOB) Positioning: HOB at 20-30 degrees  Taken 6/23/2024 0201 by Murphy Herndon RN  Head of Bed (HOB) Positioning: HOB elevated  Taken 6/23/2024 0042 by Murphy Herndon RN  Pressure Reduction Techniques:   frequent weight shift encouraged   weight shift assistance provided  Head of Bed (HOB) Positioning: HOB elevated  Pressure Reduction Devices:   alternating pressure pump (ADD)   positioning supports utilized  Skin Protection:   adhesive use limited   incontinence pads utilized   transparent dressing maintained   tubing/devices free from skin contact  Taken 6/22/2024 2247 by Murphy Herndon RN  Head of Bed (HOB) Positioning: HOB elevated  Taken 6/22/2024 2002 by Murphy Herndon RN  Pressure Reduction Techniques:   frequent weight shift encouraged   weight shift assistance provided  Head of Bed (HOB) Positioning: HOB elevated  Pressure Reduction Devices:   alternating pressure pump (ADD)   positioning supports utilized  Skin Protection:   adhesive use limited   incontinence pads utilized   tubing/devices free from skin contact   transparent dressing maintained     Problem: Fall Injury Risk  Goal: Absence of Fall and Fall-Related Injury  Intervention: Identify and Manage Contributors  Flowsheets  Taken 6/22/2024 1800 by Linette French RN  Medication Review/Management: medications reviewed  Taken 6/21/2024 0800 by Vannesa Finnegan RN  Self-Care Promotion: independence encouraged  Intervention: Promote Injury-Free Environment  Flowsheets  Taken 6/23/2024 0350 by Lawanda Gaston PCT  Safety Promotion/Fall Prevention:   safety round/check completed    assistive device/personal items within reach  Taken 6/23/2024 0201 by Murphy Herndon RN  Safety Promotion/Fall Prevention:   activity supervised   assistive device/personal items within reach   clutter free environment maintained   fall prevention program maintained   lighting adjusted   nonskid shoes/slippers when out of bed   room organization consistent   safety round/check completed  Taken 6/23/2024 0042 by Murphy Herndon RN  Safety Promotion/Fall Prevention:   activity supervised   assistive device/personal items within reach   clutter free environment maintained   fall prevention program maintained   lighting adjusted   nonskid shoes/slippers when out of bed   room organization consistent   safety round/check completed  Taken 6/22/2024 2247 by Murphy Herndon RN  Safety Promotion/Fall Prevention:   activity supervised   assistive device/personal items within reach   clutter free environment maintained   fall prevention program maintained   lighting adjusted   nonskid shoes/slippers when out of bed   room organization consistent   safety round/check completed  Taken 6/22/2024 2002 by Murphy Herndon RN  Safety Promotion/Fall Prevention:   activity supervised   assistive device/personal items within reach   clutter free environment maintained   fall prevention program maintained   lighting adjusted   nonskid shoes/slippers when out of bed   room organization consistent   safety round/check completed     Problem: Restraint, Nonviolent  Goal: Absence of Harm or Injury  Intervention: Implement Least Restrictive Safety Strategies  Recent Flowsheet Documentation  Taken 6/23/2024 0042 by Murphy Herndon RN  Diversional Activities: television  Intervention: Protect Dignity, Rights, and Personal Wellbeing  Recent Flowsheet Documentation  Taken 6/23/2024 0042 by Murphy Herndon RN  Trust Relationship/Rapport:   care explained   choices provided   emotional support provided   empathic listening provided   questions answered   questions  encouraged   reassurance provided   thoughts/feelings acknowledged  Taken 6/22/2024 2002 by Murphy Herndon RN  Trust Relationship/Rapport:   care explained   choices provided   emotional support provided   empathic listening provided   questions answered   questions encouraged   reassurance provided   thoughts/feelings acknowledged  Intervention: Protect Skin and Joint Integrity  Recent Flowsheet Documentation  Taken 6/23/2024 0201 by Murphy Herndon RN  Body Position: supine  Taken 6/23/2024 0042 by Murphy Herndon RN  Body Position:   tilted   left  Range of Motion:   active ROM (range of motion) encouraged   ROM (range of motion) performed  Taken 6/22/2024 2247 by Murphy Herndon RN  Body Position:   tilted   right  Taken 6/22/2024 2002 by Murphy Herndon RN  Body Position: supine   Goal Outcome Evaluation:  Plan of Care Reviewed With: patient, spouse        Progress: no change  Outcome Evaluation: No acute changes. AOx2, to self and place. Wife @ bedside. Makes needs known. Heparin gtt running per orders. No c/o pain or discomfort. Bed locked and in lowest position. Side rails up x2. Call light within reach. Will continue to assess.

## 2024-06-23 NOTE — PLAN OF CARE
Problem: Adult Inpatient Plan of Care  Goal: Plan of Care Review  Outcome: Ongoing, Progressing  Goal: Patient-Specific Goal (Individualized)  Outcome: Ongoing, Progressing  Goal: Absence of Hospital-Acquired Illness or Injury  Outcome: Ongoing, Progressing  Intervention: Identify and Manage Fall Risk  Recent Flowsheet Documentation  Taken 6/23/2024 1600 by Celine Jaime RN  Safety Promotion/Fall Prevention: activity supervised  Taken 6/23/2024 1400 by Celine Jaime RN  Safety Promotion/Fall Prevention:   activity supervised   assistive device/personal items within reach  Taken 6/23/2024 1245 by Celine Jaime RN  Safety Promotion/Fall Prevention:   assistive device/personal items within reach   activity supervised  Taken 6/23/2024 1025 by Celine Jaime RN  Safety Promotion/Fall Prevention: activity supervised  Taken 6/23/2024 0915 by Celine Jaime RN  Safety Promotion/Fall Prevention: activity supervised  Taken 6/23/2024 0800 by Celine Jaime RN  Safety Promotion/Fall Prevention: activity supervised  Intervention: Prevent Skin Injury  Recent Flowsheet Documentation  Taken 6/23/2024 1600 by Celine Jaime RN  Body Position:   weight shifting   position changed independently  Taken 6/23/2024 1400 by Celine Jaime RN  Body Position:   position changed independently   weight shifting  Taken 6/23/2024 1245 by Celine Jaime RN  Body Position: weight shifting  Taken 6/23/2024 1025 by Celine Jaime RN  Body Position: weight shifting  Taken 6/23/2024 0915 by Celine Jaime RN  Body Position:   supine   supine, legs elevated   weight shifting  Skin Protection: adhesive use limited  Taken 6/23/2024 0800 by Celine Jaime RN  Body Position: weight shifting  Intervention: Prevent and Manage VTE (Venous Thromboembolism) Risk  Recent Flowsheet Documentation  Taken 6/23/2024 1600 by Celine Jaime RN  Activity Management: activity encouraged  Taken 6/23/2024 1400 by Celine Jaime RN  Activity Management:  activity encouraged  Taken 6/23/2024 1245 by Celine Jaime RN  Activity Management: activity encouraged  Taken 6/23/2024 1025 by Celine Jaime RN  Activity Management: activity encouraged  Taken 6/23/2024 0915 by Celine Jaime RN  Activity Management: activity encouraged  Range of Motion: active ROM (range of motion) encouraged  Taken 6/23/2024 0800 by Celine Jaime RN  Activity Management: activity encouraged  Intervention: Prevent Infection  Recent Flowsheet Documentation  Taken 6/23/2024 1600 by Celine Jaime RN  Infection Prevention: single patient room provided  Taken 6/23/2024 1400 by Celine Jaime RN  Infection Prevention: single patient room provided  Taken 6/23/2024 1245 by Celine Jaime RN  Infection Prevention: single patient room provided  Taken 6/23/2024 1025 by Celine Jaime RN  Infection Prevention: single patient room provided  Taken 6/23/2024 0915 by Celine Jaime RN  Infection Prevention: single patient room provided  Taken 6/23/2024 0800 by Celine Jaime RN  Infection Prevention: single patient room provided  Goal: Optimal Comfort and Wellbeing  Outcome: Ongoing, Progressing  Intervention: Provide Person-Centered Care  Recent Flowsheet Documentation  Taken 6/23/2024 0915 by Celine Jaime RN  Trust Relationship/Rapport:   care explained   choices provided  Goal: Readiness for Transition of Care  Outcome: Ongoing, Progressing   Goal Outcome Evaluation:

## 2024-06-23 NOTE — PROGRESS NOTES
Infectious Diseases Progress Note    Tino Nagel MD     [facility]  [place]: 23 days  Patient Identification:  Name: Dilip Verma  Age: 74 y.o.  Sex: male  :  1950  MRN: [id number]         Primary Care Physician: Fernando Camargo,         Subjective: No specific complaints continues to do well though had slept a lot earlier today.  Interval History: See consultation note.  2024 MRSA screen is negative.  2024 completed IV vancomycin for left arm cellulitis and phlebitis with improvement  Objective:    Scheduled Meds:amiodarone, 200 mg, Oral, Q24H  [Held by provider] apixaban, 5 mg, Oral, Q12H  aspirin, 81 mg, Oral, Daily  brimonidine, 1 drop, Both Eyes, BID  bumetanide, 1 mg, Oral, BID  DULoxetine, 30 mg, Oral, Daily  guaiFENesin, 600 mg, Oral, Q12H  insulin lispro, 2-9 Units, Subcutaneous, 4x Daily AC & at Bedtime  ipratropium-albuterol, 3 mL, Nebulization, 4x Daily - RT  latanoprost, 1 drop, Both Eyes, Nightly  Menthol-Zinc Oxide, 1 Application, Topical, Q12H  metoprolol tartrate, 12.5 mg, Oral, Q12H  OLANZapine, 5 mg, Oral, Nightly  polyethylene glycol, 17 g, Oral, BID  senna-docusate sodium, 2 tablet, Oral, BID  sodium chloride, 10 mL, Intravenous, Q12H  sodium chloride, 10 mL, Intravenous, Q12H  sodium chloride, 4 mL, Nebulization, BID - RT  terazosin, 1 mg, Oral, Nightly  thiamine, 100 mg, Oral, Daily      Continuous Infusions:heparin, 10.4 Units/kg/hr, Last Rate: 16.4 Units/kg/hr (24 0812)            Vital signs in last 24 hours:  Temp:  [97.7 °F (36.5 °C)-98.8 °F (37.1 °C)] 98.4 °F (36.9 °C)  Heart Rate:  [55-64] 62  Resp:  [16-18] 16  BP: (114-150)/(52-78) 136/54    Intake/Output:    Intake/Output Summary (Last 24 hours) at 2024  Last data filed at 2024 1700  Gross per 24 hour   Intake 480 ml   Output 2400 ml   Net -1920 ml           Exam:  /54 (BP Location: Left arm, Patient Position: Lying)   Pulse 62   Temp 98.4 °F (36.9 °C) (Oral)   Resp 16   " Ht 172.7 cm (68\")   Wt 91.8 kg (202 lb 6.1 oz)   SpO2 98%   BMI 30.77 kg/m²   Patient is examined using the personal protective equipment as per guidelines from infection control for this particular patient as enacted.  Hand washing was performed before and after patient interaction.  General Appearance: Confused interactive and follows command.                          Head:    Normocephalic, without obvious abnormality, atraumatic                           Eyes:    PERRL, conjunctivae/corneas clear, EOM's intact, both eyes                         Throat:   Lips, tongue, gums normal; oral mucosa pink and moist                           Neck:   Supple, symmetrical, trachea midline, no JVD                         Lungs:    Clear to auscultation bilaterally, respirations unlabored                 Chest Wall:    No tenderness or deformity                          Heart:  S1-S2 regular                  Abdomen:   Soft nontender                 Extremities: Improvement in swelling and redness of the left arm.  Left lower extremity edema and swelling is much improved.                    Neurologic: No acute distress       Data Review:    I reviewed the patient's new clinical results.  Results from last 7 days   Lab Units 06/22/24  1114 06/18/24  0639 06/17/24  0600 06/16/24  0546   WBC 10*3/mm3 8.29 6.79 7.06 7.97   HEMOGLOBIN g/dL 10.2* 10.5* 10.1* 10.4*   PLATELETS 10*3/mm3 229 256 250 263     Results from last 7 days   Lab Units 06/22/24  0627 06/21/24  0732 06/20/24  0822 06/19/24  0753 06/18/24  0639 06/17/24  0559 06/16/24  0546   SODIUM mmol/L 139 142 139 139 138 137 136   POTASSIUM mmol/L 4.6 4.7 4.4 4.2 4.4 4.5 4.4   CHLORIDE mmol/L 106 106 104 105 105 105 101   CO2 mmol/L 25.5 28.0 27.0 24.0 20.6* 22.0 20.5*   BUN mg/dL 37* 40* 38* 35* 27* 53* 44*   CREATININE mg/dL 3.31* 3.45* 3.36* 3.64* 3.46* 5.23* 4.81*   CALCIUM mg/dL 8.5* 8.5* 8.4* 8.3* 8.2* 8.0* 8.1*   GLUCOSE mg/dL 214* 170* 195* 135* 111* 141* 159* "     Microbiology Results (last 10 days)       Procedure Component Value - Date/Time    Urine Culture - Urine, Urine, Clean Catch [[id number]]  (Abnormal) Collected: 06/21/24 1459    Lab Status: Final result Specimen: Urine, Clean Catch Updated: 06/22/24 1515     Urine Culture Yeast isolated     Comment: No further workup.         Narrative:      6.22 89k yst. DCM  Colonization of the urinary tract without infection is common. Treatment is discouraged unless the patient is symptomatic, pregnant, or undergoing an invasive urologic procedure.            Assessment:    Acute UTI (urinary tract infection)    Type 2 diabetes mellitus with hyperglycemia, with long-term current use of insulin    CAD (coronary artery disease)    Hx of CABG    Chronic diastolic heart failure    CSA (central sleep apnea)    HTN (hypertension)    History of stroke    CKD (chronic kidney disease) stage 3, GFR 30-59 ml/min    Peripheral arterial disease    Sepsis    Hyperkalemia    Metabolic encephalopathy    AMENA (acute kidney injury)    Paroxysmal atrial fibrillation  1-metabolic encephalopathy due to  2-systemic infection as a result of urinary tract infection as well as evolving sepsis traumatic cellulitis of the left leg.  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     Recommendations/Discussions:  Despite the fact that he is negative MRSA screen his right arm-continue with IV vancomycin for phlebitis and secondary cellulitis of the left arm as it is showing improvement.  Completed Vancomycin on 6/19/2024 - going forward observe off  antibiotics  Continue with left arm elevation  Continue supportive care per primary team.  Tino Nagel MD  6/22/2024  20:27 EDT    Parts of this note may be an electronic transcription/translation of spoken language to printed text using the Dragon dictation system.

## 2024-06-23 NOTE — PROGRESS NOTES
[facility]     LOS: 24 days     Subjective:    CC:Altered Mental Status    DIET:  Diet Order   Procedures    Diet: Regular/House, Diabetic, Cardiac; Healthy Heart (2-3 Na+); Consistent Carbohydrate; Texture: Regular (IDDSI 7); Fluid Consistency: Thin (IDDSI 0)     Awake and talking to me today, no new complaints tolerating oral intake    Objective:    Vital Signs:  Temp:  [97.5 °F (36.4 °C)-98.4 °F (36.9 °C)] 97.5 °F (36.4 °C)  Heart Rate:  [54-62] 55  Resp:  [16-18] 16  BP: (113-136)/(50-57) 113/50    SpO2:  [92 %-98 %] 96 %  on  Flow (L/min):  [3] 3;   Device (Oxygen Therapy): humidified;nasal cannula  Body mass index is 29.87 kg/m².    Physical Exam  Constitutional:       Comments: Sleeping but awakens   HENT:      Head: Normocephalic and atraumatic.   Cardiovascular:      Rate and Rhythm: Normal rate and regular rhythm.      Heart sounds: No murmur heard.     No friction rub.   Pulmonary:      Effort: Pulmonary effort is normal.      Breath sounds: Normal breath sounds.   Abdominal:      General: Bowel sounds are normal. There is no distension.      Palpations: Abdomen is soft.      Tenderness: There is no abdominal tenderness.   Musculoskeletal:      Comments: Right arm   Skin:     General: Skin is warm and dry.         Results Review:    Glucose   Date Value Ref Range Status   06/23/2024 133 (H) 65 - 99 mg/dL Final   06/22/2024 214 (H) 65 - 99 mg/dL Final   06/21/2024 170 (H) 65 - 99 mg/dL Final     Results from last 7 days   Lab Units 06/22/24  1114   WBC 10*3/mm3 8.29   HEMOGLOBIN g/dL 10.2*   HEMATOCRIT % 32.1*   PLATELETS 10*3/mm3 229     Results from last 7 days   Lab Units 06/23/24  0639   SODIUM mmol/L 140   POTASSIUM mmol/L 4.7   CHLORIDE mmol/L 103   CO2 mmol/L 29.0   BUN mg/dL 41*   CREATININE mg/dL 2.95*   CALCIUM mg/dL 8.4*   GLUCOSE mg/dL 133*     Results from last 7 days   Lab Units 06/23/24  0639   APTT seconds 84.7*             Cultures:  No results found for:  "\"BLOODCX\", \"URINECX\", \"WOUNDCX\", \"MRSACX\", \"RESPCX\", \"STOOLCX\"    I have reviewed daily medications and changes in CPOE    Scheduled meds  amiodarone, 200 mg, Oral, Q24H  [Held by provider] apixaban, 5 mg, Oral, Q12H  aspirin, 81 mg, Oral, Daily  brimonidine, 1 drop, Both Eyes, BID  bumetanide, 1 mg, Oral, BID  DULoxetine, 30 mg, Oral, Daily  guaiFENesin, 600 mg, Oral, Q12H  insulin lispro, 2-9 Units, Subcutaneous, 4x Daily AC & at Bedtime  ipratropium-albuterol, 3 mL, Nebulization, 4x Daily - RT  latanoprost, 1 drop, Both Eyes, Nightly  Menthol-Zinc Oxide, 1 Application, Topical, Q12H  metoprolol tartrate, 12.5 mg, Oral, Q12H  OLANZapine, 5 mg, Oral, Nightly  polyethylene glycol, 17 g, Oral, BID  senna-docusate sodium, 2 tablet, Oral, BID  sodium chloride, 10 mL, Intravenous, Q12H  sodium chloride, 10 mL, Intravenous, Q12H  sodium chloride, 4 mL, Nebulization, BID - RT  terazosin, 1 mg, Oral, Nightly  thiamine, 100 mg, Oral, Daily        heparin, 10.4 Units/kg/hr, Last Rate: 16.4 Units/kg/hr (06/22/24 2241)      PRN meds    acetaminophen **OR** acetaminophen **OR** acetaminophen    senna-docusate sodium **AND** polyethylene glycol **AND** bisacodyl **AND** bisacodyl    dextrose    dextrose    glucagon (human recombinant)    heparin (porcine)    midodrine    nitroglycerin    OLANZapine    ondansetron    sodium chloride    sodium chloride    sodium chloride    sodium chloride    traMADol        Acute UTI (urinary tract infection)    Type 2 diabetes mellitus with hyperglycemia, with long-term current use of insulin    CAD (coronary artery disease)    Hx of CABG    Chronic diastolic heart failure    CSA (central sleep apnea)    HTN (hypertension)    History of stroke    CKD (chronic kidney disease) stage 3, GFR 30-59 ml/min    Peripheral arterial disease    Sepsis    Hyperkalemia    Metabolic encephalopathy    AMENA (acute kidney injury)    Paroxysmal atrial fibrillation        Assessment/Plan:    74 y.o. male admitted " with Acute UTI (urinary tract infection).     Assessment and plan  Acute UTI/LLE Cellulitis  -Rocephin 6/30 - 6/2, Zosyn 6/2 - 6/3, cefepime 6/3 - 6/8 (concerned that this caused encephalopathy), vancomycin 6/15 - 6/19   -Urine culture with Serratia sensitive to Rocephin and Zosyn and cefepime  -No evidence of infection of the left arm, patient has been seen during the hospital stay by Dr Robe BECKWITH  -Nephrology on board, follow management recommendations.  -Patient slightly volume overloaded and requiring IV diuretic  -Bumex 1 mg twice daily  -Patient has had an improvement in creatinine today compared to yesterday     HTNCAD/PAD/New Onset Afib (PAF)/Chronic Diastolic CHF  - BP acceptable, no anginal symptoms, in NSR  - continue on current regimen  - heparin gtt, hold on eliquis until we know for sure he does not need a tunnel cath     Type 2 DM  - had hypoglycemia, improved off of lantus  -Small doses of insulin sliding scale  -Blood sugars controlled     Hypoxia  -Patient is on oxygen at night at home  -Currently on 3 L of oxygen, improved from several days ago when the patient was requiring 4 L     Delirium/Toxic Encephalopathy  - continue on scheduled and PRN zyprexa  - he is current with palliatus at home and takes PRN tramadol, this has been restarted     Hypokalemia  - replace K+     Likely with catheter removal tomorrow and potential discharge next 1 to 2 days    Nephrology writes in the note about possible further workup for amyloidosis will ask cardiology to re-eval    Que Shah MD  06/23/24  13:26 EDT

## 2024-06-23 NOTE — PROGRESS NOTES
PROGRESS NOTE      Patient Name: Dilip Verma  : 1950  MRN: [id number]  Primary Care Physician: Fernando Camargo DO  Date of admission: 2024    Patient Care Team:  Fernando Camargo DO as PCP - General (Family Medicine)  Morris Cai MD as Consulting Physician (Sleep Medicine)  Michaela Hylton MD as Consulting Physician (Cardiology)  Hardy Banerjee MD as Consulting Physician (Ophthalmology)  Chula Christianson MD as Consulting Physician (Ophthalmology)  Jason Sherman MD (Endocrinology)  Perla Mayen MD as Consulting Physician (Nephrology)  Federico Hebert MD as Consulting Physician (Pulmonary Disease)  Niraj Ravi MD as Consulting Physician (Orthopedic Surgery)  Papa Velasco MD as Consulting Physician (Urology)  Terese Yost MD as Consulting Physician (Gastroenterology)  Aracelis Ball MD as Consulting Physician (Colon and Rectal Surgery)  [facility] as Primary Care Provider        Reason for Follow up:     AMENA, CKD III      Subjective:     Patient seen and examined, more awake, still  confused  S/p dialysis 24  Renal function continues to improve.   Creatinine down to 2.95 urine output 2.4.    Review of Systems:         Constitutional: weakness.  HEENT:  No headache, otalgia, itchy eyes, nasal discharge or sore throat.  Cardiac:  No chest pain, dyspnea, orthopnea or PND.  Chest:  No cough, phlegm or wheezing.  Abdomen:  No abdominal pain, nausea or vomiting.  Neuro:  No focal weakness, abnormal movements or seizure-like activity.  :   No hematuria, no pyuria, no dysuria, no flank pain.  Extremities:  No  joint pains.  ROS was otherwise negative except as mentioned in the [place].    Social History:  reports that he quit smoking about 24 years ago. His smoking use included cigarettes. He started smoking about 40 years ago. He has a 12 pack-year smoking history. He has been exposed to tobacco smoke. He has never used smokeless  tobacco. He reports that he does not currently use alcohol. He reports that he does not use drugs.    Medications:  Prior to Admission medications    Medication Sig Start Date End Date Taking? Authorizing Provider   aspirin 81 MG EC tablet Take 1 tablet by mouth Every Night.   Yes Hrei Rinaldi MD   brimonidine (ALPHAGAN) 0.2 % ophthalmic solution Administer 1 drop to both eyes 2 (Two) Times a Day.   Yes Heri Rinaldi MD   bumetanide (BUMEX) 1 MG tablet Take 1 tablet by mouth Daily.   Yes Heri Rinaldi MD   Cholecalciferol (Vitamin D-3) 125 MCG (5000 UT) tablet Take 1 tablet by mouth Daily.   Yes Heri Rinaldi MD   DULoxetine (CYMBALTA) 30 MG capsule Take 1 capsule by mouth Every Night. 1/16/24  Yes Heri Rinaldi MD   Insulin Glargine, 2 Unit Dial, (TOUJEO) 300 UNIT/ML solution pen-injector injection Inject 24 Units under the skin into the appropriate area as directed Daily.   Yes Heri Rinaldi MD   insulin lispro (humaLOG) 100 UNIT/ML injection Inject 0-14 Units under the skin into the appropriate area as directed 3 (Three) Times a Day Before Meals.  Patient taking differently: Inject 0-14 Units under the skin into the appropriate area as directed 3 (Three) Times a Day Before Meals. SLIDING SCALE  100-150 - 4 units  151-200 - 5 units  201-250 - 6 units  251-300 - 7 units  301-350 - 8 units  351-400 - 9 units  401+ - 10 units 12/7/20  Yes Amador Valverde MD   latanoprost (XALATAN) 0.005 % ophthalmic solution Administer 1 drop to both eyes every night at bedtime. 1/16/23  Yes Heri Rinaldi MD   multivitamin with minerals (MULTIVITAMIN ADULTS PO) Take 1 tablet by mouth Daily.   Yes Heri Rinaldi MD   testosterone (ANDROGEL) 25 MG/2.5GM (1%) gel gel Place 25 mg on the skin as directed by provider 2 (Two) Times a Week.   Yes Heri Rinaldi MD   traMADol (ULTRAM) 50 MG tablet Take 1 tablet by mouth At Night As Needed. 10/16/23  Yes Gentry  MD Heri   Magnesium 400 MG capsule Take 400 mg by mouth As Needed.    ProviderHeri MD   sildenafil (REVATIO) 20 MG tablet Take 1 tablet by mouth As Needed.    Provider, MD Heri     Scheduled Meds:amiodarone, 200 mg, Oral, Q24H  [Held by provider] apixaban, 5 mg, Oral, Q12H  aspirin, 81 mg, Oral, Daily  brimonidine, 1 drop, Both Eyes, BID  bumetanide, 1 mg, Oral, BID  DULoxetine, 30 mg, Oral, Daily  guaiFENesin, 600 mg, Oral, Q12H  insulin lispro, 2-9 Units, Subcutaneous, 4x Daily AC & at Bedtime  ipratropium-albuterol, 3 mL, Nebulization, 4x Daily - RT  latanoprost, 1 drop, Both Eyes, Nightly  Menthol-Zinc Oxide, 1 Application, Topical, Q12H  metoprolol tartrate, 12.5 mg, Oral, Q12H  OLANZapine, 5 mg, Oral, Nightly  polyethylene glycol, 17 g, Oral, BID  senna-docusate sodium, 2 tablet, Oral, BID  sodium chloride, 10 mL, Intravenous, Q12H  sodium chloride, 10 mL, Intravenous, Q12H  sodium chloride, 4 mL, Nebulization, BID - RT  terazosin, 1 mg, Oral, Nightly  thiamine, 100 mg, Oral, Daily      Continuous Infusions:heparin, 10.4 Units/kg/hr, Last Rate: 16.4 Units/kg/hr (06/22/24 8617)          PRN Meds:  acetaminophen **OR** acetaminophen **OR** acetaminophen    senna-docusate sodium **AND** polyethylene glycol **AND** bisacodyl **AND** bisacodyl    dextrose    dextrose    glucagon (human recombinant)    heparin (porcine)    midodrine    nitroglycerin    OLANZapine    ondansetron    sodium chloride    sodium chloride    sodium chloride    sodium chloride    traMADol  Allergies:    Allergies   Allergen Reactions    Ace Inhibitors Angioedema    Angiotensin Receptor Blockers Angioedema and Unknown (See Comments)     Angioneurotic edema    Dapagliflozin Other (See Comments)     UTI,  rectal abcess    Losartan Angioedema     Angioneurotic edema    Seroquel [Quetiapine] Hallucinations    Xanax [Alprazolam] Unknown - High Severity    Amlodipine Swelling    Ativan [Lorazepam] Hallucinations    Baclofen  "Hallucinations    Misc. Sulfonamide Containing Compounds Unknown (See Comments)    Minoxidil Confusion    Tetracycline Rash     bliisters in mouth         Objective   Exam:     Vital Signs  Temp:  [97.5 °F (36.4 °C)-98.4 °F (36.9 °C)] 97.5 °F (36.4 °C)  Heart Rate:  [54-62] 56  Resp:  [16-18] 16  BP: (113-136)/(50-57) 113/50  SpO2:  [92 %-98 %] 92 %  on  Flow (L/min):  [3] 3;   Device (Oxygen Therapy): nasal cannula;humidified  Body mass index is 29.87 kg/m².       Exam:     /50 (BP Location: Right arm, Patient Position: Lying)   Pulse 56   Temp 97.5 °F (36.4 °C) (Oral)   Resp 16   Ht 172.7 cm (68\")   Wt 89.1 kg (196 lb 6.9 oz)   SpO2 92%   BMI 29.87 kg/m²     General Appearance:    Appears chronically ill, no distress   Head:    Normocephalic, atraumatic   Eyes:     EOM's intact, sclerae anicteric        Ears:    TMs not observed   Nose:   Patent without discharge   Neck:   Supple, JVD   Lungs:     Clear to auscultation bilaterally, respiratory effort is normal   Chest wall:    No tenderness   Heart:    Regular rate and rhythm, S1 and S2 normal, no   rub    or gallop   Abdomen:     Soft, nontender, nondistended,  no masses, no organomegaly   Extremities:   No edema   Neurologic:  No focal deficits.  Speech is fluent.  Conversation is coherent.      Results Review:  I have personally reviewed most recent Data :  [facility]  CBC    Results from last 7 days   Lab Units 06/22/24  1114 06/18/24  0639 06/17/24  0600   WBC 10*3/mm3 8.29 6.79 7.06   HEMOGLOBIN g/dL 10.2* 10.5* 10.1*   PLATELETS 10*3/mm3 229 256 250     CMP   Results from last 7 days   Lab Units 06/23/24  0639 06/22/24  0627 06/21/24  0732 06/20/24  0822 06/19/24  0753 06/18/24  0639 06/17/24  0559   SODIUM mmol/L 140 139 142 139 139 138 137   POTASSIUM mmol/L 4.7 4.6 4.7 4.4 4.2 4.4 4.5   CHLORIDE mmol/L 103 106 106 104 105 105 105   CO2 mmol/L 29.0 25.5 28.0 27.0 24.0 20.6* 22.0   BUN mg/dL 41* 37* 40* 38* 35* 27* 53* "   CREATININE mg/dL 2.95* 3.31* 3.45* 3.36* 3.64* 3.46* 5.23*   GLUCOSE mg/dL 133* 214* 170* 195* 135* 111* 141*     ABG            XR Chest PA & Lateral    Result Date: 6/21/2024   1. Stable right IJ central venous catheter. 2. Low lung volumes with similar-appearing left greater than right bilateral perihilar and bibasilar opacities that could represent atelectasis and/or pneumonia with possible small pleural effusions and possible superimposed mild pulmonary vascular congestion/pulmonary edema.  This report was finalized on 6/21/2024 1:05 PM by Julio Thomas MD on Workstation: [id number]      XR Chest Post CVA Port    Result Date: 6/15/2024  1. Since yesterday's portable chest x-ray on 6/14/2024 at 2:15 p.m., there is been interval placement of a right internal jugular central line-Shiley catheter and its distal tip projects over the superior vena cava in good position and no pneumothorax is seen. Otherwise, there has been no change when compared to yesterday's chest x-ray 6/14/2024 at 2:15 p.m.  2. The patient's had previous median sternotomy there is prominent blunting of the left lateral costophrenic angle and some hazy density over the inferior left hemithorax likely secondary to at least a small to moderate size up to moderate size left pleural effusion there is some dense airspace consolidation in the left lower lung zone left lung base likely compressive atelectasis associated with the left pleural effusion although I cannot exclude superimposed pneumonia at the left lower lobe or lung base and correlate clinically. No additional active disease is seen in the chest.  This report was finalized on 6/15/2024 5:23 PM by Dr. Jaquan Mendieta M.D on Workstation: [id number]       Results for orders placed during the hospital encounter of 05/30/24    Adult Transthoracic Echo Complete W/ Cont if Necessary Per Protocol    Interpretation Summary    Left ventricular systolic function is hyperdynamic (EF > 70%). Calculated  left ventricular EF = 75% Global longitudinal LV strain (GLS) = -17.8%. Left ventricle strain data was reviewed by the physician and found to be accurate. Global longitudinal LV strain is normal however there is regional abnormality with apical sparing suggestive of amyloid heart disease. However there is also basal ptal hypertrophy suggestive of hypertrophic cardiomyopathy. Wall motion abnormality is also noted in the basal septum and cannot rule out ischemic component.Consider additional imaging such as cardiac MRI and further evaluation also for amyloid heart disease as clinically indicated. The left ventricular cavity is small in size. Left ventricular wall thickness is consistent with moderate to severe septal asymmetric hypertrophy. There is hypokinesis of the left ventricular basal septum. Left ventricular diastolic function is consistent with (grade II w/high LAP) pseudonormalization.    The left atrial cavity is mildly dilated.    No aortic valve regurgitation or stenosis is present. The aortic valve is abnormal in structure. There is mild thickening of the aortic valve.    There is mild, bileaflet mitral valve thickening present. Trace mitral valve regurgitation is present. No significant mitral valve stenosis is present.    Moderate tricuspid valve regurgitation is present. Estimated right ventricular systolic pressure from tricuspid regurgitation is markedly elevated (>55 mmHg). Calculated right ventricular systolic pressure from tricuspid regurgitation is 74 mmHg.        Assessment & Plan   Assessment and Plan:         Acute UTI (urinary tract infection)    Type 2 diabetes mellitus with hyperglycemia, with long-term current use of insulin    CAD (coronary artery disease)    Hx of CABG    Chronic diastolic heart failure    CSA (central sleep apnea)    HTN (hypertension)    History of stroke    CKD (chronic kidney disease) stage 3, GFR 30-59 ml/min    Peripheral arterial disease    Sepsis     Hyperkalemia    Metabolic encephalopathy    AMENA (acute kidney injury)    Paroxysmal atrial fibrillation    ASSESSMENT:  AMENA likely prerenal ATN 2/2 urosepsis with hemodynamic changes; on CKD III etiology likely diabetic and hypertensive nephropathy with baseline sCr ~0.9-1.2 mg/dL  Hyperkalemia, now resolved  UTI, urine cultures with gram neg bacilli  Leukocytosis  Metabolic encephalopathy  PAD  Hx stroke  HTN  CHF  CAD  DM2  Chronic splenic vein thrombosis  Possible hepatocellular disease/cirrhosis  Likely benign subpleural nodule with bilateral lymphadenopathy  Hiatal hernia    Last TTE 10/5/22 with EF 66%, grade II DD    PLAN :     AMENA likely prerenal ATN 2/2 urosepsis with hemodynamic changes; on CKD III etiology likely diabetic and hypertensive nephropathy with baseline sCr ~0.9-1.2 mg/dL  Initiated on dialysis  6/15 due to worsening renal function.   HD performed on 6/17/24  Renal fx improving creatinine 2.95 BUN 41 urine output 2.4 L.  Will DC Shiley catheter.  No more need for dialysis.  Patient is more alert oriented and clinically patient getting better  Patient creatinine slightly higher than yesterday  Paraproteinemia workup unremarkable  Likely related to some cardiorenal syndrome as echocardiogram consistent with possible amyloidosis with grade 2 diastolic dysfunctions  Continue bumex 1 mg po bid    Improvement in creatinine since yesterday and with good urine output I think ATN is getting better      Discontinued [facility] catheter  May need further workup and treatment for cardiac amyloidosis  Avoid NSAIDs, nephrotoxic agents.   We will follow and coordinate with team  Monitor closely for renal recovery    Cristóbal Mayen MD  [facility]

## 2024-06-23 NOTE — PROGRESS NOTES
Infectious Diseases Progress Note    Tino Nagel MD     [facility]  [place]: 24 days  Patient Identification:  Name: Dilip Verma  Age: 74 y.o.  Sex: male  :  1950  MRN: [id number]         Primary Care Physician: Fernando Camargo,         Subjective: Continues to do well with no significant pain and discomfort and swelling and redness of the left arm and left leg.  Has been observed over the course of the last 4 days of antibiotic therapy and has done well.  Mental status is improved.  Interval History: See consultation note.  2024 MRSA screen is negative.  2024 completed IV vancomycin for left arm cellulitis and phlebitis with improvement  Objective:    Scheduled Meds:amiodarone, 200 mg, Oral, Q24H  [Held by provider] apixaban, 5 mg, Oral, Q12H  aspirin, 81 mg, Oral, Daily  brimonidine, 1 drop, Both Eyes, BID  bumetanide, 1 mg, Oral, BID  DULoxetine, 30 mg, Oral, Daily  guaiFENesin, 600 mg, Oral, Q12H  insulin lispro, 2-9 Units, Subcutaneous, 4x Daily AC & at Bedtime  ipratropium-albuterol, 3 mL, Nebulization, 4x Daily - RT  latanoprost, 1 drop, Both Eyes, Nightly  Menthol-Zinc Oxide, 1 Application, Topical, Q12H  metoprolol tartrate, 12.5 mg, Oral, Q12H  OLANZapine, 5 mg, Oral, Nightly  polyethylene glycol, 17 g, Oral, BID  senna-docusate sodium, 2 tablet, Oral, BID  sodium chloride, 10 mL, Intravenous, Q12H  sodium chloride, 10 mL, Intravenous, Q12H  sodium chloride, 4 mL, Nebulization, BID - RT  terazosin, 1 mg, Oral, Nightly  thiamine, 100 mg, Oral, Daily      Continuous Infusions:heparin, 10.4 Units/kg/hr, Last Rate: 16.4 Units/kg/hr (24 2241)            Vital signs in last 24 hours:  Temp:  [97.5 °F (36.4 °C)-98.4 °F (36.9 °C)] 97.5 °F (36.4 °C)  Heart Rate:  [54-62] 56  Resp:  [16-18] 16  BP: (113-136)/(50-57) 113/50    Intake/Output:    Intake/Output Summary (Last 24 hours) at 2024 0917  Last data filed at 2024 0735  Gross per 24 hour   Intake 240 ml  "  Output 2100 ml   Net -1860 ml           Exam:  /50 (BP Location: Right arm, Patient Position: Lying)   Pulse 56   Temp 97.5 °F (36.4 °C) (Oral)   Resp 16   Ht 172.7 cm (68\")   Wt 91.8 kg (202 lb 6.1 oz)   SpO2 92%   BMI 30.77 kg/m²   Patient is examined using the personal protective equipment as per guidelines from infection control for this particular patient as enacted.  Hand washing was performed before and after patient interaction.  General Appearance: Confused interactive and follows command.                          Head:    Normocephalic, without obvious abnormality, atraumatic                           Eyes:    PERRL, conjunctivae/corneas clear, EOM's intact, both eyes                         Throat:   Lips, tongue, gums normal; oral mucosa pink and moist                           Neck:   Supple, symmetrical, trachea midline, no JVD                         Lungs:    Clear to auscultation bilaterally, respirations unlabored                 Chest Wall:    No tenderness or deformity                          Heart:  S1-S2 regular                  Abdomen:   Soft nontender                 Extremities: Improvement in swelling and redness of the left arm.  Left lower extremity edema and swelling is much improved.                    Neurologic: No acute distress       Data Review:    I reviewed the patient's new clinical results.  Results from last 7 days   Lab Units 06/22/24  1114 06/18/24  0639 06/17/24  0600   WBC 10*3/mm3 8.29 6.79 7.06   HEMOGLOBIN g/dL 10.2* 10.5* 10.1*   PLATELETS 10*3/mm3 229 256 250     Results from last 7 days   Lab Units 06/23/24  0639 06/22/24  0627 06/21/24  0732 06/20/24  0822 06/19/24  0753 06/18/24  0639 06/17/24  0559   SODIUM mmol/L 140 139 142 139 139 138 137   POTASSIUM mmol/L 4.7 4.6 4.7 4.4 4.2 4.4 4.5   CHLORIDE mmol/L 103 106 106 104 105 105 105   CO2 mmol/L 29.0 25.5 28.0 27.0 24.0 20.6* 22.0   BUN mg/dL 41* 37* 40* 38* 35* 27* 53*   CREATININE mg/dL 2.95* " 3.31* 3.45* 3.36* 3.64* 3.46* 5.23*   CALCIUM mg/dL 8.4* 8.5* 8.5* 8.4* 8.3* 8.2* 8.0*   GLUCOSE mg/dL 133* 214* 170* 195* 135* 111* 141*     Microbiology Results (last 10 days)       Procedure Component Value - Date/Time    Urine Culture - Urine, Urine, Clean Catch [[id number]]  (Abnormal) Collected: 06/21/24 1459    Lab Status: Final result Specimen: Urine, Clean Catch Updated: 06/22/24 1515     Urine Culture Yeast isolated     Comment: No further workup.         Narrative:      6.22 89k yst. DCM  Colonization of the urinary tract without infection is common. Treatment is discouraged unless the patient is symptomatic, pregnant, or undergoing an invasive urologic procedure.            Assessment:    Acute UTI (urinary tract infection)    Type 2 diabetes mellitus with hyperglycemia, with long-term current use of insulin    CAD (coronary artery disease)    Hx of CABG    Chronic diastolic heart failure    CSA (central sleep apnea)    HTN (hypertension)    History of stroke    CKD (chronic kidney disease) stage 3, GFR 30-59 ml/min    Peripheral arterial disease    Sepsis    Hyperkalemia    Metabolic encephalopathy    AMENA (acute kidney injury)    Paroxysmal atrial fibrillation  1-metabolic encephalopathy due to  2-systemic infection as a result of urinary tract infection as well as evolving sepsis traumatic cellulitis of the left leg.  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     Recommendations/Discussions:  Despite the fact that he is negative MRSA screen his right arm-continue with IV vancomycin for phlebitis and  secondary cellulitis of the left arm-improved  Completed Vancomycin on 6/19/2024 - going forward observe off antibiotics  Continue with left arm elevation  Continue supportive care per primary team.  Will sign off but will be available if needed.  Tino Nagel MD  6/23/2024  09:17 EDT    Parts of this note may be an electronic transcription/translation of spoken language to printed text using the Dragon dictation system.

## 2024-06-24 LAB
ANION GAP SERPL CALCULATED.3IONS-SCNC: 7.9 MMOL/L (ref 5–15)
APTT PPP: 56.1 SECONDS (ref 22.7–35.4)
APTT PPP: 98.8 SECONDS (ref 22.7–35.4)
BUN SERPL-MCNC: 42 MG/DL (ref 8–23)
BUN/CREAT SERPL: 13.5 (ref 7–25)
CALCIUM SPEC-SCNC: 8.7 MG/DL (ref 8.6–10.5)
CHLORIDE SERPL-SCNC: 101 MMOL/L (ref 98–107)
CO2 SERPL-SCNC: 29.1 MMOL/L (ref 22–29)
CREAT SERPL-MCNC: 3.11 MG/DL (ref 0.76–1.27)
EGFRCR SERPLBLD CKD-EPI 2021: 20.1 ML/MIN/1.73
GLUCOSE BLDC GLUCOMTR-MCNC: 182 MG/DL (ref 70–130)
GLUCOSE BLDC GLUCOMTR-MCNC: 189 MG/DL (ref 70–130)
GLUCOSE BLDC GLUCOMTR-MCNC: 194 MG/DL (ref 70–130)
GLUCOSE BLDC GLUCOMTR-MCNC: 245 MG/DL (ref 70–130)
GLUCOSE SERPL-MCNC: 177 MG/DL (ref 65–99)
POTASSIUM SERPL-SCNC: 4.5 MMOL/L (ref 3.5–5.2)
SODIUM SERPL-SCNC: 138 MMOL/L (ref 136–145)

## 2024-06-24 PROCEDURE — 25010000002 HEPARIN (PORCINE) PER 1000 UNITS: Performed by: INTERNAL MEDICINE

## 2024-06-24 PROCEDURE — 80048 BASIC METABOLIC PNL TOTAL CA: CPT

## 2024-06-24 PROCEDURE — 63710000001 INSULIN LISPRO (HUMAN) PER 5 UNITS: Performed by: INTERNAL MEDICINE

## 2024-06-24 PROCEDURE — 85730 THROMBOPLASTIN TIME PARTIAL: CPT | Performed by: STUDENT IN AN ORGANIZED HEALTH CARE EDUCATION/TRAINING PROGRAM

## 2024-06-24 PROCEDURE — 94799 UNLISTED PULMONARY SVC/PX: CPT

## 2024-06-24 PROCEDURE — 25010000002 HEPARIN (PORCINE) 25000-0.45 UT/250ML-% SOLUTION: Performed by: INTERNAL MEDICINE

## 2024-06-24 PROCEDURE — 85730 THROMBOPLASTIN TIME PARTIAL: CPT | Performed by: INTERNAL MEDICINE

## 2024-06-24 PROCEDURE — 94664 DEMO&/EVAL PT USE INHALER: CPT

## 2024-06-24 PROCEDURE — 99232 SBSQ HOSP IP/OBS MODERATE 35: CPT | Performed by: INTERNAL MEDICINE

## 2024-06-24 PROCEDURE — 94760 N-INVAS EAR/PLS OXIMETRY 1: CPT

## 2024-06-24 PROCEDURE — 97530 THERAPEUTIC ACTIVITIES: CPT

## 2024-06-24 PROCEDURE — 97535 SELF CARE MNGMENT TRAINING: CPT

## 2024-06-24 PROCEDURE — 94761 N-INVAS EAR/PLS OXIMETRY MLT: CPT

## 2024-06-24 PROCEDURE — 82948 REAGENT STRIP/BLOOD GLUCOSE: CPT

## 2024-06-24 RX ORDER — TRAMADOL HYDROCHLORIDE 50 MG/1
50 TABLET ORAL ONCE
Status: COMPLETED | OUTPATIENT
Start: 2024-06-24 | End: 2024-06-24

## 2024-06-24 RX ADMIN — LATANOPROST 1 DROP: 50 SOLUTION OPHTHALMIC at 21:16

## 2024-06-24 RX ADMIN — BRIMONIDINE TARTRATE 1 DROP: 2 SOLUTION OPHTHALMIC at 21:16

## 2024-06-24 RX ADMIN — INSULIN LISPRO 4 UNITS: 100 INJECTION, SOLUTION INTRAVENOUS; SUBCUTANEOUS at 17:14

## 2024-06-24 RX ADMIN — HEPARIN SODIUM 2900 UNITS: 5000 INJECTION INTRAVENOUS; SUBCUTANEOUS at 11:01

## 2024-06-24 RX ADMIN — IPRATROPIUM BROMIDE AND ALBUTEROL SULFATE 3 ML: .5; 3 SOLUTION RESPIRATORY (INHALATION) at 20:17

## 2024-06-24 RX ADMIN — METOPROLOL TARTRATE 12.5 MG: 25 TABLET, FILM COATED ORAL at 08:33

## 2024-06-24 RX ADMIN — Medication 1 APPLICATION: at 08:33

## 2024-06-24 RX ADMIN — Medication 1 APPLICATION: at 21:16

## 2024-06-24 RX ADMIN — Medication 10 ML: at 08:34

## 2024-06-24 RX ADMIN — DULOXETINE HYDROCHLORIDE 30 MG: 30 CAPSULE, DELAYED RELEASE ORAL at 21:14

## 2024-06-24 RX ADMIN — OLANZAPINE 5 MG: 5 TABLET, FILM COATED ORAL at 21:14

## 2024-06-24 RX ADMIN — Medication 4 ML: at 20:17

## 2024-06-24 RX ADMIN — INSULIN LISPRO 2 UNITS: 100 INJECTION, SOLUTION INTRAVENOUS; SUBCUTANEOUS at 08:32

## 2024-06-24 RX ADMIN — BUMETANIDE 1 MG: 1 TABLET ORAL at 08:33

## 2024-06-24 RX ADMIN — AMIODARONE HYDROCHLORIDE 200 MG: 200 TABLET ORAL at 08:33

## 2024-06-24 RX ADMIN — TRAMADOL HYDROCHLORIDE 50 MG: 50 TABLET ORAL at 22:50

## 2024-06-24 RX ADMIN — Medication 100 MG: at 08:33

## 2024-06-24 RX ADMIN — APIXABAN 5 MG: 5 TABLET, FILM COATED ORAL at 21:14

## 2024-06-24 RX ADMIN — GUAIFENESIN 600 MG: 600 TABLET, EXTENDED RELEASE ORAL at 08:33

## 2024-06-24 RX ADMIN — METOPROLOL TARTRATE 12.5 MG: 25 TABLET, FILM COATED ORAL at 21:15

## 2024-06-24 RX ADMIN — Medication 4 ML: at 07:22

## 2024-06-24 RX ADMIN — HEPARIN SODIUM 16.4 UNITS/KG/HR: 10000 INJECTION, SOLUTION INTRAVENOUS at 09:45

## 2024-06-24 RX ADMIN — GUAIFENESIN 600 MG: 600 TABLET, EXTENDED RELEASE ORAL at 21:15

## 2024-06-24 RX ADMIN — TERAZOSIN HYDROCHLORIDE 1 MG: 1 CAPSULE ORAL at 21:14

## 2024-06-24 RX ADMIN — BRIMONIDINE TARTRATE 1 DROP: 2 SOLUTION OPHTHALMIC at 08:34

## 2024-06-24 RX ADMIN — Medication 10 ML: at 21:15

## 2024-06-24 RX ADMIN — BUMETANIDE 1 MG: 1 TABLET ORAL at 17:14

## 2024-06-24 RX ADMIN — IPRATROPIUM BROMIDE AND ALBUTEROL SULFATE 3 ML: .5; 3 SOLUTION RESPIRATORY (INHALATION) at 07:22

## 2024-06-24 RX ADMIN — ASPIRIN 81 MG: 81 TABLET, CHEWABLE ORAL at 08:32

## 2024-06-24 RX ADMIN — IPRATROPIUM BROMIDE AND ALBUTEROL SULFATE 3 ML: .5; 3 SOLUTION RESPIRATORY (INHALATION) at 11:06

## 2024-06-24 NOTE — PLAN OF CARE
Goal Outcome Evaluation:  Plan of Care Reviewed With: (P) patient, spouse        Progress: (P) no change  Outcome Evaluation: (P) Pt seen for OT treatment this date. Pt on BSC upon arrival and A&Ox3 with some illogical thought throughout. Pt dep for toileting skills and assist x2 from OT/PT and nurse aide to complete brief mgmt and corwin hygiene. Pt demo'd min-mod Ax2 STS transfer from BSC and took several steps with rwx and min Ax2 to return to EOB. Pt completed another STS transfer and ambulated forward/backwards and displayed 1 significant LOB. Pt also engaged in 2x10 BUE strengthening exercises. Pt required mod Ax2 to get back to supine. Overall, pt presents with limited (I) and safety during ADLs and functional mobility and is a high falls risk. Rec d/c SNF for safety      Anticipated Discharge Disposition (OT): (P) skilled nursing facility

## 2024-06-24 NOTE — PLAN OF CARE
Goal Outcome Evaluation:  Plan of Care Reviewed With: patient, spouse        Progress: no change  Outcome Evaluation: Patient is alert and oriented x4- forgetful and occasionally odd comments, VSS on 3 L NC. Patient worked with PT today and was able to get up to BSC with assist x2. Heparin drip discontinued. Patient's bed alarm on, bed in lowest position, and call light within reach.

## 2024-06-24 NOTE — THERAPY TREATMENT NOTE
Patient Name: Dilip Verma  : 1950    MRN: [id number]                              Today's Date: 2024       Admit Date: 2024    Visit Dx:     ICD-10-CM ICD-9-CM   1. Acute respiratory failure with hypoxia  J96.01 518.81   2. Sepsis without acute organ dysfunction, due to unspecified organism  A41.9 038.9     995.91   3. Hyperkalemia  E87.5 276.7   4. Metabolic encephalopathy  G93.41 348.31   5. Acute UTI  N39.0 599.0   6. Hyperglycemia due to diabetes mellitus  E11.65 250.02   7. Chronic heart failure with preserved ejection fraction (HFpEF)  I50.32 428.9   8. Acute UTI (urinary tract infection)  N39.0 599.0     Patient Active Problem List   Diagnosis    Anxiety    Colon polyp    Type 2 diabetes mellitus with hyperglycemia, with long-term current use of insulin    Erectile dysfunction    Hyperlipidemia    Type 2 acute myocardial infarction    CAD (coronary artery disease)    Hx of CABG    Chronic diastolic heart failure    Hypersomnia due to medical condition    CSA (central sleep apnea)    Periodic breathing    HTN (hypertension)    History of stroke    CKD (chronic kidney disease) stage 3, GFR 30-59 ml/min    Urinary retention    Acute on chronic renal failure    Acute UTI (urinary tract infection)    Acute on chronic diastolic (congestive) heart failure    Bacteriuria    Rectal pain    Status post total replacement of hip    Vitamin D deficiency    Post-acute COVID-19 syndrome    Insulin dose changed    Arthropathy associated with neurological disorder    Personal history of colonic polyps    Type 2 diabetes mellitus with diabetic neuropathy, with long-term current use of insulin    Cervical spinal stenosis    Abscess of skin    Overweight    Cervical stenosis of spine    Spinal stenosis, lumbar region, without neurogenic claudication    Medically noncompliant    Chronic heart failure with preserved ejection fraction (HFpEF)    Carotid stenosis    Peripheral arterial disease    Sepsis     Hyperkalemia    Metabolic encephalopathy    AMENA (acute kidney injury)    Paroxysmal atrial fibrillation     Past Medical History:   Diagnosis Date    AMENA (acute kidney injury) 12/03/2020    Alcoholism 1989    not since 1998    CORI positive     Anemia     Anxiety     Ataxia     Atypical chest pain 04/19/2022    SEEN AT [facility] ER    Balance disorder     Carotid stenosis 3/28/2024    Cataract     BILATERAL, S/P EXTRACTION    Cervical radiculopathy 11/11/2019    SEEN AT  [facility] ER    Cervical spinal cord compression     Chronic diastolic (congestive) heart failure     Chronic kidney disease     STAGE 3, FOLLOWED BY DR. VIVAR    Chronic pancreatitis     Closed left subtrochanteric femur fracture 12/01/2020    ADMITTED TO [facility]    Closed nondisplaced intertrochanteric fracture of left femur 12/01/2020    ADMITTED TO [facility]    Colon polyps     FOLLOWED BY DR. AVTAR JEONG    Constipation     Contracture, right hand     Coronary artery disease     CABG 7/2019    COVID-19 07/2022    DDD (degenerative disc disease), cervical     Diabetes mellitus, type II     IDDM    Diabetic retinopathy     Difficulty walking     Dysphagia     Elevated brain natriuretic peptide (BNP) level 08/2014    Elevated LFTs 03/2021    Erectile dysfunction     Fissure, anal 2022    Foot drop     Fuchs' corneal dystrophy of right eye     Glaucoma     BILATERAL    History of alcohol abuse     Hyperlipidemia     Hypersomnia     Hypertension     Hypertensive urgency 02/25/2020    ADMITTED TO [facility]    Insomnia     Kidney stones     LV dysfunction 06/2016    Lyme disease     Lymphadenopathy syndrome 05/2021    Macular edema     BILATERAL    Myocardial infarction 11/05/2019    NSTEMI, ADMITTED TO [facility]    Myocardial infarction 07/12/2019    NSTEMI, ADMITTED TO [facility]    Neuropathy in diabetes     Non-celiac gluten sensitivity     Orthostasis     Osteoporosis     Oxygen dependent     PAD (peripheral artery disease)     Paroxysmal atrial fibrillation 6/6/2024    PNA (pneumonia)  "06/2016    LEFT LOBE    Polyneuropathy     PTSD (post-traumatic stress disorder)     Pulmonary hypertension     Pulmonary nodule     Senile ectropion of both lower eyelids 02/2022    Sepsis 12/16/2020    D/T UTI, ADMITTED TO [facility]    Sleep apnea     STATES DOESN'T USE BIPAP OR CPAP    Spinal stenosis in cervical region     SEVERE-LIMITED ROM    Stroke 2012    \"slight stroke\"    Syncope and collapse 07/06/2019    ADMITTED TO [place]    Urinary retention 04/05/2021    SEEN AT [facility] ER    Vision loss     Vitamin D deficiency      Past Surgical History:   Procedure Laterality Date    CARDIAC CATHETERIZATION N/A 07/15/2019    Procedure: Coronary angiography;  Surgeon: Carrie Price MD;  Location:  [facility];  Service: Cardiovascular    CARDIAC CATHETERIZATION N/A 07/15/2019    Procedure: Left Heart Cath;  Surgeon: Carrie Price MD;  Location:  [facility];  Service: Cardiovascular    CARDIAC CATHETERIZATION N/A 07/15/2019    Procedure: Left ventriculography;  Surgeon: Carrie Price MD;  Location:  [facility];  Service: Cardiovascular    CARDIAC CATHETERIZATION  07/15/2019    Procedure: Functional Flow [place];  Surgeon: Carrie Price MD;  Location:  [facility];  Service: Cardiovascular    CARDIAC CATHETERIZATION N/A 11/06/2019    Procedure: Right and Left Heart Cath;  Surgeon: Mya Smith MD;  Location:  [facility];  Service: Cardiovascular    CARDIAC CATHETERIZATION N/A 11/06/2019    Procedure: Coronary angiography;  Surgeon: Mya Smith MD;  Location:  [facility];  Service: Cardiovascular    CARDIAC SURGERY      CATARACT EXTRACTION Left 2014    CATARACT EXTRACTION Right 11/2016    PHACO/IOL, DR. LEN DENTON    COLONOSCOPY N/A 03/16/2023    ENTIRE COLON WNL, RESCOPE IN 5 YRS, DR. LINDA LIZARRAGA AT [facility]    COLONOSCOPY W/ POLYPECTOMY N/A 01/02/2015    A FEW DIVERTICULA IN SIGMOID, 6 MM TUBULOVILLOUS " ADENOMA POLYP IN RECTUM, SMALL HEMORRHOIDS, MELANOSIS COLI, DR. AVTAR JEONG AT [facility]    CORONARY ARTERY BYPASS GRAFT N/A 07/18/2019    Procedure: INTRAOPERATIVE SHOAIB; STERNOTOMY CORONARY ARTERY BYPASS x 3  USING LEFT INTERNAL MAMMARY ARTERY GRAFT UTILIZING ENDOSCOPICALLY HARVESTED RIGHT GREATER SAPHENOUS VEIN AND PRP.;  Surgeon: Bill Devi MD;  Location: [facility] OR;  Service: Cardiothoracic    CYSTOSCOPY BLADDER STONE LITHOTRIPSY N/A     DENTAL PROCEDURE Bilateral     3 surgeries inder implants    FEMUR IM NAILING/RODDING Left 12/03/2020    Procedure: LEFT HIP INTRAMEDULLARY NAIL;  Surgeon: Niraj Ravi MD;  Location: [facility] MAIN OR;  Service: Orthopedic Spine;  Laterality: Left;    INCISION AND DRAINAGE PERIRECTAL ABSCESS N/A 10/04/2023    Procedure: Incision and drainage of perianal and buttock abscess;  Surgeon: Herbie Villatoro MD;  Location: [facility] MAIN OR;  Service: General;  Laterality: N/A;    INGUINAL HERNIA REPAIR Bilateral     TOENAIL EXCISION  06/2022    TONSILLECTOMY Bilateral     TOTAL HIP ARTHROPLASTY REVISION Left 01/11/2022    Procedure: TOTAL HIP ARTHROPLASTY REVISION- POSTERIOR;  Surgeon: Jurgen Candelaria II, MD;  Location: [facility] OR;  Service: Orthopedics;  Laterality: Left;    VASECTOMY N/A       General Information       Row Name 06/24/24 1450          OT Time and Intention    Document Type therapy note (daily note) (P)   -MF     Mode of Treatment co-treatment;physical therapy;occupational therapy (P)   co treatment medically necessary d/t medical status and safety of pt  -MF       Row Name 06/24/24 9897          General Information    Patient Profile Reviewed yes (P)   -MF     Existing Precautions/Restrictions fall;oxygen therapy device and L/min (P)   -MF     Barriers to Rehab medically complex;previous functional deficit (P)   -MF       Row Name 06/24/24 5947          Cognition    Orientation Status (Cognition) oriented x 3 (P)   -       Row  Name 06/24/24 1457          Safety Issues, Functional Mobility    Safety Issues Affecting Function (Mobility) insight into deficits/self-awareness;safety precaution awareness;safety precautions follow-through/compliance;judgment (P)   -     Impairments Affecting Function (Mobility) balance;cognition;endurance/activity tolerance;strength;motor control (P)   -     Cognitive Impairments, Mobility Safety/Performance attention;awareness, need for assistance;insight into deficits/self-awareness;judgment;problem-solving/reasoning;safety precaution awareness;safety precaution follow-through (P)   -               User Key  (r) = Recorded By, (t) = Taken By, (c) = Cosigned By      Initials Name Provider Type     Annita Tabares OT Student OT Student                     Mobility/ADL's       Row Name 06/24/24 1501          Bed Mobility    Bed Mobility sit-supine (P)   -     Sit-Supine [place] (Bed Mobility) moderate assist (50% patient effort);2 person assist (P)   -     Bed Mobility, Safety Issues decreased use of legs for bridging/pushing;decreased use of arms for pushing/pulling (P)   -     Assistive Device (Bed Mobility) bed rails;draw sheet;head of bed elevated (P)   -     Comment, (Bed Mobility) on [facility] [place] aide upon arrival (P)   -       Row Name 06/24/24 1501          Transfers    Transfers sit-stand transfer;stand-sit transfer (P)   -     Comment, (Transfers) [place] [facility] and [place] (P)   -       Row Name 06/24/24 1501          Sit-Stand Transfer    Sit-Stand [place] (Transfers) minimum assist (75% patient effort);2 person assist;moderate assist (50% patient effort) (P)   -     Assistive Device (Sit-Stand Transfers) walker, front-wheeled (P)   -       Row Name 06/24/24 1501          Stand-Sit Transfer    Stand-Sit [place] (Transfers) moderate assist (50% patient effort);minimum assist (75% patient effort);2 person assist (P)   -     Assistive Device (Stand-Sit Transfers)  walker, front-wheeled (P)   -       Row Name 06/24/24 1501          Functional Mobility    Functional Mobility- Ind. Level minimum assist (75% patient effort);2 person assist required (P)   -     Functional Mobility- Device walker, front-wheeled (P)   -     Functional Mobility- Safety Issues loses balance backward;supplemental O2;sequencing ability decreased (P)   -     Functional Mobility- Comment ambulated a few forward/backward steps with cueing (P)   -     Patient was able to Ambulate yes (P)   -       Row Name 06/24/24 1501          Activities of Daily Living    BADL Assessment/Intervention toileting (P)   -       Row Name 06/24/24 1501          Toileting Assessment/Training    [place] Level (Toileting) toileting skills;dependent (less than 25% patient effort);adjust/manage clothing;change pad/brief;perform perineal hygiene (P)   -               User Key  (r) = Recorded By, (t) = Taken By, (c) = Cosigned By      Initials Name Provider Type     Annita Tabares OT Student OT Student                   Obj/Interventions       Row Name 06/24/24 1504          Elbow/Forearm (Therapeutic Exercise)    Elbow/Forearm (Therapeutic Exercise) strengthening exercise (P)   -     Elbow/Forearm Strengthening (Therapeutic Exercise) bilateral;flexion;extension;10 repetitions;2 sets (P)   -       Row Name 06/24/24 1504          Motor Skills    Motor Skills coordination;functional endurance (P)   -       Row Name 06/24/24 1504          Balance    Balance Assessment sitting static balance;sitting dynamic balance;sit to stand dynamic balance;standing static balance;standing dynamic balance (P)   -     Static Sitting Balance standby assist;1-person assist (P)   -     Dynamic Sitting Balance contact guard;1-person assist (P)   -     Position, Sitting Balance sitting edge of bed (P)   -     Sit to Stand Dynamic Balance minimal assist;moderate assist;2-person assist (P)   -     Static Standing  Balance minimal assist;2-person assist (P)   -MF     Dynamic Standing Balance minimal assist;moderate assist;2-person assist (P)   -MF     Position/Device Used, Standing Balance walker, front-wheeled (P)   -MF     Balance Interventions sitting;standing;sit to stand;supported;static;dynamic;moderate challenge (P)   -     Comment, Balance 1 significant LOB backwards during ambulation (P)   -MF               User Key  (r) = Recorded By, (t) = Taken By, (c) = Cosigned By      Initials Name Provider Type    Annita Salinas OT Student OT Student                   Goals/Plan    No documentation.                  Clinical Impression       Row Name 06/24/24 1506          Pain Assessment    Pretreatment Pain Rating 0/10 - no pain (P)   -MF     Posttreatment Pain Rating 0/10 - no pain (P)   -       Row Name 06/24/24 1507          Plan of Care Review    Plan of Care Reviewed With patient;spouse (P)   -     Progress no change (P)   -     Outcome Evaluation Pt seen for OT treatment this date. Pt on BSC upon arrival and A&Ox3 with some illogical thought throughout. Pt dep for toileting skills and assist x2 from OT/PT and nurse aide to complete brief mgmt and corwin hygiene. Pt demo'd min-mod Ax2 STS transfer from BSC and took several steps with rwx and min Ax2 to return to EOB. Pt completed another STS transfer and ambulated forward/backwards and displayed 1 significant LOB. Pt also engaged in 2x10 BUE strengthening exercises. Pt required mod Ax2 to get back to supine. Overall, pt presents with limited (I) and safety during ADLs and functional mobility and is a high falls risk. Rec d/c SNF for safety (P)   -       Row Name 06/24/24 1509          Therapy Assessment/Plan (OT)    Rehab Potential (OT) fair, will monitor progress closely (P)   -     Criteria for Skilled Therapeutic Interventions Met (OT) yes;skilled treatment is necessary (P)   -     Therapy Frequency (OT) 5 times/wk (P)   -       Row Name  06/24/24 1506          Therapy Plan Review/Discharge Plan (OT)    Anticipated Discharge Disposition (OT) skilled nursing facility (P)   -       Row Name 06/24/24 1506          Vital Signs    O2 Delivery Pre Treatment supplemental O2 (P)   -     O2 Delivery Intra Treatment supplemental O2 (P)   -MF     O2 Delivery Post Treatment supplemental O2 (P)   -MF       Row Name 06/24/24 1506          Positioning and Restraints    Pre-Treatment Position bedside commode (P)   -     Post Treatment Position bed (P)   -     In Bed notified nsg;supine;call light within reach;exit alarm on (P)   -MF               User Key  (r) = Recorded By, (t) = Taken By, (c) = Cosigned By      Initials Name Provider Type     Annita Tabares OT Student OT Student                   Outcome Measures       Row Name 06/24/24 0830          How much help from another person do you currently need...    Turning from your back to your side while in flat bed without using bedrails? 3  -TH     Moving from lying on back to sitting on the side of a flat bed without bedrails? 3  -TH     Moving to and from a bed to a chair (including a wheelchair)? 2  -TH     Standing up from a chair using your arms (e.g., wheelchair, bedside chair)? 2  -TH     Climbing 3-5 steps with a railing? 1  -TH     To walk in hospital room? 2  -TH     AM-PAC 6 Clicks Score (PT) 13  -TH     Highest Level of Mobility Goal 4 --> Transfer to chair/commode  -TH               User Key  (r) = Recorded By, (t) = Taken By, (c) = Cosigned By      Initials Name Provider Type     Melani Salinas, RN Registered Nurse                    Occupational Therapy Education       Title: PT OT SLP Therapies (In Progress)       Topic: Occupational Therapy (Done)       Point: ADL training (Done)       Description:   Instruct learner(s) on proper safety adaptation and remediation techniques during self care or transfers.   Instruct in proper use of assistive devices.                  Learning  Progress Summary             Patient Acceptance, E,TB, VU,NR by CB at 6/21/2024 2234    Acceptance, E,TB,D, VU by CB at 6/1/2024 2218    Acceptance, E, VU by MF at 5/31/2024 1056   Family Acceptance, E,TB,D, VU by CB at 6/1/2024 2218    Acceptance, E, VU by MF at 5/31/2024 1056   Significant Other Acceptance, E,TB, VU,NR by CB at 6/21/2024 2234                         Point: Home exercise program (Done)       Description:   Instruct learner(s) on appropriate technique for monitoring, assisting and/or progressing therapeutic exercises/activities.                  Learning Progress Summary             Patient Acceptance, E,TB, VU,NR by CB at 6/21/2024 2234    Acceptance, E,TB,D, VU by CB at 6/1/2024 2218    Acceptance, E, VU by MF at 5/31/2024 1056   Family Acceptance, E,TB,D, VU by CB at 6/1/2024 2218    Acceptance, E, VU by MF at 5/31/2024 1056   Significant Other Acceptance, E,TB, VU,NR by CB at 6/21/2024 2234                         Point: Precautions (Done)       Description:   Instruct learner(s) on prescribed precautions during self-care and functional transfers.                  Learning Progress Summary             Patient Acceptance, E,TB, VU,NR by CB at 6/21/2024 2234    Acceptance, E,TB,D, VU by CB at 6/1/2024 2218    Acceptance, E, VU by MF at 5/31/2024 1056   Family Acceptance, E,TB,D, VU by CB at 6/1/2024 2218    Acceptance, E, VU by MF at 5/31/2024 1056   Significant Other Acceptance, E,TB, VU,NR by CB at 6/21/2024 2234                         Point: Body mechanics (Done)       Description:   Instruct learner(s) on proper positioning and spine alignment during self-care, functional mobility activities and/or exercises.                  Learning Progress Summary             Patient Acceptance, E,TB, VU,NR by CB at 6/21/2024 2234    Acceptance, E,TB,D, VU by CB at 6/1/2024 2218    Acceptance, E, VU by MF at 5/31/2024 1056   Family Acceptance, E,TB,D, VU by CB at 6/1/2024 2218    Acceptance, E, VU by MF  at 5/31/2024 1056   Significant Other Acceptance, E,TB, VU,NR by  at 6/21/2024 7707                                         User Key       Initials Effective Dates Name Provider Type Discipline     02/21/24 -  Murphy Herndon, LUC Registered Nurse Nurse     04/30/24 -  Annita Tabares OT Student OT Student OT                  OT Recommendation and Plan  Therapy Frequency (OT): (P) 5 times/wk  Plan of Care Review  Plan of Care Reviewed With: (P) patient, spouse  Progress: (P) no change  Outcome Evaluation: (P) Pt seen for OT treatment this date. Pt on BSC upon arrival and A&Ox3 with some illogical thought throughout. Pt dep for toileting skills and assist x2 from OT/PT and nurse aide to complete brief mgmt and corwin hygiene. Pt demo'd min-mod Ax2 STS transfer from BSC and took several steps with rwx and min Ax2 to return to EOB. Pt completed another STS transfer and ambulated forward/backwards and displayed 1 significant LOB. Pt also engaged in 2x10 BUE strengthening exercises. Pt required mod Ax2 to get back to supine. Overall, pt presents with limited (I) and safety during ADLs and functional mobility and is a high falls risk. Rec d/c SNF for safety     Time Calculation:   Evaluation Complexity (OT)  Review Occupational Profile/Medical/Therapy History Complexity: expanded/moderate complexity  Assessment, Occupational Performance/Identification of Deficit Complexity: 3-5 performance deficits  Clinical Decision Making Complexity (OT): detailed assessment/moderate complexity  Overall Complexity of Evaluation (OT): moderate complexity     Time Calculation- OT       Row Name 06/24/24 1513             Time Calculation- OT    OT Start Time 1415 (P)   -      OT Stop Time 1438 (P)   -      OT Time Calculation (min) 23 min (P)   -      Total Timed Code Minutes- OT 23 minute(s) (P)   -      OT Received On 06/24/24 (P)   -      OT - Next Appointment 06/25/24 (P)   -         Timed Charges    38595 - OT  Therapeutic Activity Minutes 8 (P)   -      49654 - OT Self Care/Mgmt Minutes 15 (P)   -         Total Minutes    Timed Charges Total Minutes 23 (P)   -       Total Minutes 23 (P)   -                User Key  (r) = Recorded By, (t) = Taken By, (c) = Cosigned By      Initials Name Provider Type     Annita Tabares OT Student OT Student                  Therapy Charges for Today       Code Description Service Date Service Provider Modifiers Qty    [id number]  OT THERAPEUTIC ACT EA 15 MIN 6/24/2024 Annita Tabares OT Student GO 1    [id number]  OT SELF CARE/MGMT/TRAIN EA 15 MIN 6/24/2024 Annita Tabares OT Student GO 1                 Annita Tabares OT Student  6/24/2024

## 2024-06-24 NOTE — CASE MANAGEMENT/SOCIAL WORK
Continued Stay Note  [facility]     Patient Name: Dilip Verma  MRN: [id number]  Today's Date: 6/24/2024    Admit Date: 5/30/2024    Plan: Home with HH   Discharge Plan       Row Name 06/24/24 1415       Plan    Plan Comments CCP reviewed chart and discussed with primary RN, pt more alert, participating more with conversations, adequate UOP per nursing documentation. Cardiology has s/o, ID has also s/o, renal awaiting renal recover'. Pt remains on heparin gtt, enquired to Dr. Flynn about timing of change back to PO AC, she will address when she sees pt. Today. Spoke with pt wife at bedside, plan remains the same home with HH. Therapy working with pt at this time, pt getting up from BS, required asst of 3 for standing and cleansing. CCP will continue to follow - Nabila GONZALEZ                   Discharge Codes    No documentation.                 Expected Discharge Date and Time       Expected Discharge Date Expected Discharge Time    Jun 25, 2024               Nabila Ramsey RN

## 2024-06-24 NOTE — THERAPY TREATMENT NOTE
Patient Name: Dilip Verma  : 1950    MRN: [id number]                              Today's Date: 2024       Admit Date: 2024    Visit Dx:     ICD-10-CM ICD-9-CM   1. Acute respiratory failure with hypoxia  J96.01 518.81   2. Sepsis without acute organ dysfunction, due to unspecified organism  A41.9 038.9     995.91   3. Hyperkalemia  E87.5 276.7   4. Metabolic encephalopathy  G93.41 348.31   5. Acute UTI  N39.0 599.0   6. Hyperglycemia due to diabetes mellitus  E11.65 250.02   7. Chronic heart failure with preserved ejection fraction (HFpEF)  I50.32 428.9   8. Acute UTI (urinary tract infection)  N39.0 599.0     Patient Active Problem List   Diagnosis    Anxiety    Colon polyp    Type 2 diabetes mellitus with hyperglycemia, with long-term current use of insulin    Erectile dysfunction    Hyperlipidemia    Type 2 acute myocardial infarction    CAD (coronary artery disease)    Hx of CABG    Chronic diastolic heart failure    Hypersomnia due to medical condition    CSA (central sleep apnea)    Periodic breathing    HTN (hypertension)    History of stroke    CKD (chronic kidney disease) stage 3, GFR 30-59 ml/min    Urinary retention    Acute on chronic renal failure    Acute UTI (urinary tract infection)    Acute on chronic diastolic (congestive) heart failure    Bacteriuria    Rectal pain    Status post total replacement of hip    Vitamin D deficiency    Post-acute COVID-19 syndrome    Insulin dose changed    Arthropathy associated with neurological disorder    Personal history of colonic polyps    Type 2 diabetes mellitus with diabetic neuropathy, with long-term current use of insulin    Cervical spinal stenosis    Abscess of skin    Overweight    Cervical stenosis of spine    Spinal stenosis, lumbar region, without neurogenic claudication    Medically noncompliant    Chronic heart failure with preserved ejection fraction (HFpEF)    Carotid stenosis    Peripheral arterial disease    Sepsis     Hyperkalemia    Metabolic encephalopathy    AMENA (acute kidney injury)    Paroxysmal atrial fibrillation     Past Medical History:   Diagnosis Date    AMENA (acute kidney injury) 12/03/2020    Alcoholism 1989    not since 1998    CORI positive     Anemia     Anxiety     Ataxia     Atypical chest pain 04/19/2022    SEEN AT [facility] ER    Balance disorder     Carotid stenosis 3/28/2024    Cataract     BILATERAL, S/P EXTRACTION    Cervical radiculopathy 11/11/2019    SEEN AT  [facility] ER    Cervical spinal cord compression     Chronic diastolic (congestive) heart failure     Chronic kidney disease     STAGE 3, FOLLOWED BY DR. VIVAR    Chronic pancreatitis     Closed left subtrochanteric femur fracture 12/01/2020    ADMITTED TO [facility]    Closed nondisplaced intertrochanteric fracture of left femur 12/01/2020    ADMITTED TO [facility]    Colon polyps     FOLLOWED BY DR. AVTAR JEONG    Constipation     Contracture, right hand     Coronary artery disease     CABG 7/2019    COVID-19 07/2022    DDD (degenerative disc disease), cervical     Diabetes mellitus, type II     IDDM    Diabetic retinopathy     Difficulty walking     Dysphagia     Elevated brain natriuretic peptide (BNP) level 08/2014    Elevated LFTs 03/2021    Erectile dysfunction     Fissure, anal 2022    Foot drop     Fuchs' corneal dystrophy of right eye     Glaucoma     BILATERAL    History of alcohol abuse     Hyperlipidemia     Hypersomnia     Hypertension     Hypertensive urgency 02/25/2020    ADMITTED TO [facility]    Insomnia     Kidney stones     LV dysfunction 06/2016    Lyme disease     Lymphadenopathy syndrome 05/2021    Macular edema     BILATERAL    Myocardial infarction 11/05/2019    NSTEMI, ADMITTED TO [facility]    Myocardial infarction 07/12/2019    NSTEMI, ADMITTED TO [facility]    Neuropathy in diabetes     Non-celiac gluten sensitivity     Orthostasis     Osteoporosis     Oxygen dependent     PAD (peripheral artery disease)     Paroxysmal atrial fibrillation 6/6/2024    PNA (pneumonia)  "06/2016    LEFT LOBE    Polyneuropathy     PTSD (post-traumatic stress disorder)     Pulmonary hypertension     Pulmonary nodule     Senile ectropion of both lower eyelids 02/2022    Sepsis 12/16/2020    D/T UTI, ADMITTED TO [facility]    Sleep apnea     STATES DOESN'T USE BIPAP OR CPAP    Spinal stenosis in cervical region     SEVERE-LIMITED ROM    Stroke 2012    \"slight stroke\"    Syncope and collapse 07/06/2019    ADMITTED TO [place]    Urinary retention 04/05/2021    SEEN AT [facility] ER    Vision loss     Vitamin D deficiency      Past Surgical History:   Procedure Laterality Date    CARDIAC CATHETERIZATION N/A 07/15/2019    Procedure: Coronary angiography;  Surgeon: Carrie Price MD;  Location:  [facility];  Service: Cardiovascular    CARDIAC CATHETERIZATION N/A 07/15/2019    Procedure: Left Heart Cath;  Surgeon: Carrie Price MD;  Location:  [facility];  Service: Cardiovascular    CARDIAC CATHETERIZATION N/A 07/15/2019    Procedure: Left ventriculography;  Surgeon: Carrie Price MD;  Location:  [facility];  Service: Cardiovascular    CARDIAC CATHETERIZATION  07/15/2019    Procedure: Functional Flow Claude;  Surgeon: Carrie Price MD;  Location:  [facility];  Service: Cardiovascular    CARDIAC CATHETERIZATION N/A 11/06/2019    Procedure: Right and Left Heart Cath;  Surgeon: Mya Smith MD;  Location:  [facility];  Service: Cardiovascular    CARDIAC CATHETERIZATION N/A 11/06/2019    Procedure: Coronary angiography;  Surgeon: Mya Smith MD;  Location:  [facility];  Service: Cardiovascular    CARDIAC SURGERY      CATARACT EXTRACTION Left 2014    CATARACT EXTRACTION Right 11/2016    PHACO/IOL, DR. LEN DENTON    COLONOSCOPY N/A 03/16/2023    ENTIRE COLON WNL, RESCOPE IN 5 YRS, DR. LINDA LIZARRAGA AT [facility]    COLONOSCOPY W/ POLYPECTOMY N/A 01/02/2015    A FEW DIVERTICULA IN SIGMOID, 6 MM TUBULOVILLOUS " ADENOMA POLYP IN RECTUM, SMALL HEMORRHOIDS, MELANOSIS COLI, DR. AVTAR JEONG AT [place] [facility]    CORONARY ARTERY BYPASS GRAFT N/A 07/18/2019    Procedure: INTRAOPERATIVE SHOAIB; STERNOTOMY CORONARY ARTERY BYPASS x 3  USING LEFT INTERNAL MAMMARY ARTERY GRAFT UTILIZING ENDOSCOPICALLY HARVESTED RIGHT GREATER SAPHENOUS VEIN AND PRP.;  Surgeon: Bill Devi MD;  Location: [facility] OR;  Service: Cardiothoracic    CYSTOSCOPY BLADDER STONE LITHOTRIPSY N/A     DENTAL PROCEDURE Bilateral     3 surgeries inder implants    FEMUR IM NAILING/RODDING Left 12/03/2020    Procedure: LEFT HIP INTRAMEDULLARY NAIL;  Surgeon: Niraj Ravi MD;  Location: [facility] OR;  Service: Orthopedic Spine;  Laterality: Left;    INCISION AND DRAINAGE PERIRECTAL ABSCESS N/A 10/04/2023    Procedure: Incision and drainage of perianal and buttock abscess;  Surgeon: Herbie Villatoro MD;  Location: [facility] OR;  Service: General;  Laterality: N/A;    INGUINAL HERNIA REPAIR Bilateral     TOENAIL EXCISION  06/2022    TONSILLECTOMY Bilateral     TOTAL HIP ARTHROPLASTY REVISION Left 01/11/2022    Procedure: TOTAL HIP ARTHROPLASTY REVISION- POSTERIOR;  Surgeon: Jurgen Candelaria II, MD;  Location: [facility] OR;  Service: Orthopedics;  Laterality: Left;    VASECTOMY N/A       General Information       Row Name 06/24/24 4229          Physical Therapy Time and Intention    Document Type therapy note (daily note)  -     Mode of Treatment co-treatment;physical therapy;occupational therapy  -       Row Name 06/24/24 2343          General Information    Existing Precautions/Restrictions fall;oxygen therapy device and L/min  -       Row Name 06/24/24 5087          Cognition    Orientation Status (Cognition) oriented x 3  -       Row Name 06/24/24 1558          Safety Issues, Functional Mobility    Impairments Affecting Function (Mobility) balance;cognition;endurance/activity tolerance;strength;motor control  -     Comment,  Safety Issues/Impairments (Mobility) Co treatment medically appropriate and necessary due to patient acuity level, activity tolerance and safety of patient and staff.  -               User Key  (r) = Recorded By, (t) = Taken By, (c) = Cosigned By      Initials Name Provider Type     Kaitlynn Cohn, PT Physical Therapist                   Mobility       Row Name 06/24/24 1559          Bed Mobility    Bed Mobility supine-sit;sit-supine  -     Supine-Sit [place] (Bed Mobility) not tested  standing by bedside commode with OT and aide present when PT entered room  -     Sit-Supine [place] (Bed Mobility) verbal cues;nonverbal cues (demo/gesture);moderate assist (50% patient effort);2 person assist  -     Assistive Device (Bed Mobility) bed rails;head of bed elevated  -       Row Name 06/24/24 1556          Sit-Stand Transfer    Sit-Stand [place] (Transfers) verbal cues;nonverbal cues (demo/gesture);minimum assist (75% patient effort);moderate assist (50% patient effort);2 person assist  -     Assistive Device (Sit-Stand Transfers) walker, front-wheeled  -     Comment, (Sit-Stand Transfer) pt performed sit to stand x2, required cues or sequencing and hand placement  -       Row Name 06/24/24 1551          Gait/Stairs (Locomotion)    [place] Level (Gait) verbal cues;nonverbal cues (demo/gesture);minimum assist (75% patient effort);moderate assist (50% patient effort);2 person assist  -     Assistive Device (Gait) walker, front-wheeled  -     Distance in Feet (Gait) 5  x2, bsc to bed, then several steps forward and back  -     Deviations/Abnormal Patterns (Gait) farhana decreased;festinating/shuffling;gait speed decreased;stride length decreased  -     Bilateral Gait Deviations forward flexed posture  -     Comment, (Gait/Stairs) gait was ataxic and unsteady, PT provided cues and assistance to avoid tripping on wires and tubes  -               User Key  (r) = Recorded By,  (t) = Taken By, (c) = Cosigned By      Initials Name Provider Type    Kaitlynn Hernández, PT Physical Therapist                   Obj/Interventions    No documentation.                  Goals/Plan    No documentation.                  Clinical Impression       Row Name 06/24/24 1601          Pain    Pretreatment Pain Rating 0/10 - no pain  -     Posttreatment Pain Rating 0/10 - no pain  -       Row Name 06/24/24 1601          Plan of Care Review    Plan of Care Reviewed With patient;spouse  -     Outcome Evaluation Pt standing at [facility] with OT and aide present upon PT entering room. PT assisted patient and provided cues for taking steps from bsc to bed. Pt was also able to take some steps forward and back with heavy cues and assistance for sequencing and safety. PT luis angel continue to follow to address strength, mobility, and gait. While patient does demonstrate some functional improvements this date, PT continues to recommend SNF upon DC.  -       Row Name 06/24/24 1601          Positioning and Restraints    Pre-Treatment Position bedside commode  -     Post Treatment Position bed  -     In Bed supine;notified nsg;call light within reach;encouraged to call for assist;exit alarm on;with family/caregiver  -               User Key  (r) = Recorded By, (t) = Taken By, (c) = Cosigned By      Initials Name Provider Type    Kaitlynn Hernández, PT Physical Therapist                   Outcome Measures       Row Name 06/24/24 1608 06/24/24 0830       How much help from another person do you currently need...    Turning from your back to your side while in flat bed without using bedrails? 2  -CH 3  -TH    Moving from lying on back to sitting on the side of a flat bed without bedrails? 2  -CH 3  -TH    Moving to and from a bed to a chair (including a wheelchair)? 2  -CH 2  -TH    Standing up from a chair using your arms (e.g., wheelchair, bedside chair)? 2  -CH 2  -TH    Climbing 3-5 steps with a railing? 1  - 1   -TH    To walk in hospital room? 2  - 2  -TH    AM-PAC 6 Clicks Score (PT) 11  - 13  -TH    Highest Level of Mobility Goal 4 --> Transfer to chair/commode  - 4 --> Transfer to chair/commode  -      Row Name 06/24/24 1608          Functional Assessment    Outcome Measure Options AM-PAC 6 Clicks Basic Mobility (PT)  -               User Key  (r) = Recorded By, (t) = Taken By, (c) = Cosigned By      Initials Name Provider Type     Kaitlynn Cohn, PT Physical Therapist     Melani Salinas, RN Registered Nurse                                 Physical Therapy Education       Title: PT OT SLP Therapies (In Progress)       Topic: Physical Therapy (In Progress)       Point: Mobility training (Done)       Learning Progress Summary             Patient Acceptance, E,TB,D, VU,NR by  at 6/24/2024 1609    Acceptance, E,TB, VU,NR by CB at 6/21/2024 2234    Acceptance, E,TB, NR by CB1 at 6/21/2024 1525    Acceptance, E,D, VU,NR by EB at 6/19/2024 1624    Acceptance, E,D, NR,NL by EB at 6/17/2024 1627    Acceptance, E,TB, NR by ST at 6/14/2024 1538    Acceptance, E,D, NL by EB at 6/12/2024 1321    Acceptance, E, NR by EM at 6/7/2024 1511    Acceptance, E,D, DU by PC at 6/5/2024 1518    Acceptance, E,D, DU,NR by PC at 6/4/2024 1557    Acceptance, E,D, DU by PC at 6/2/2024 1427    Acceptance, E,TB,D, VU by CB at 6/1/2024 2218    Acceptance, E,D, DU by PC at 5/31/2024 1612   Family Acceptance, E,TB,D, VU by CB at 6/1/2024 2218   Significant Other Acceptance, E,TB, VU,NR by CB at 6/21/2024 2234    Acceptance, E, NR by EM at 6/7/2024 1511                         Point: Home exercise program (In Progress)       Learning Progress Summary             Patient Acceptance, E,TB, VU,NR by CB at 6/21/2024 2234    Acceptance, E,D, NR,NL by EB at 6/17/2024 1627    Nonacceptance, E, NL,NR by DJ at 6/10/2024 1541    Acceptance, E,D, DU by PC at 6/5/2024 1518    Acceptance, E,D, DU,NR by PC at 6/4/2024 1557    Acceptance, E,D, DU by  PC at 6/2/2024 1427    Acceptance, E,TB,D, VU by CB at 6/1/2024 2218    Acceptance, E,D, DU by PC at 5/31/2024 1612   Family Nonacceptance, E, NL,NR by DJ at 6/10/2024 1541    Acceptance, E,TB,D, VU by CB at 6/1/2024 2218   Significant Other Acceptance, E,TB, VU,NR by CB at 6/21/2024 2234                         Point: Body mechanics (In Progress)       Learning Progress Summary             Patient Acceptance, E,TB,D, VU,NR by CH at 6/24/2024 1609    Acceptance, E,TB, VU,NR by CB at 6/21/2024 2234    Acceptance, E,TB, NR by CB1 at 6/21/2024 1525    Acceptance, E,D, VU,NR by EB at 6/19/2024 1624    Acceptance, E,D, NR,NL by EB at 6/17/2024 1627    Acceptance, E,TB, NR by ST at 6/14/2024 1538    Acceptance, E,D, NL by EB at 6/12/2024 1321    Nonacceptance, E, NL,NR by DJ at 6/10/2024 1541    Acceptance, E,D, DU by PC at 6/5/2024 1518    Acceptance, E,D, DU,NR by PC at 6/4/2024 1557    Acceptance, E,D, DU by PC at 6/2/2024 1427    Acceptance, E,TB,D, VU by CB at 6/1/2024 2218    Acceptance, E,D, DU by PC at 5/31/2024 1612   Family Nonacceptance, E, NL,NR by DJ at 6/10/2024 1541    Acceptance, E,TB,D, VU by CB at 6/1/2024 2218   Significant Other Acceptance, E,TB, VU,NR by CB at 6/21/2024 2234                         Point: Precautions (In Progress)       Learning Progress Summary             Patient Acceptance, E,TB,D, VU,NR by CH at 6/24/2024 1609    Acceptance, E,TB, VU,NR by CB at 6/21/2024 2234    Acceptance, E,TB, NR by CB1 at 6/21/2024 1525    Acceptance, E,D, VU,NR by EB at 6/19/2024 1624    Nonacceptance, E, NL,NR by DJ at 6/10/2024 1541    Acceptance, E,D, DU by PC at 6/5/2024 1518    Acceptance, E,D, DU,NR by PC at 6/4/2024 1557    Acceptance, E,D, DU by PC at 6/2/2024 1427    Acceptance, E,TB,D, VU by CB at 6/1/2024 2218    Acceptance, E,D, DU by PC at 5/31/2024 1612   Family Nonacceptance, E, NL,NR by DJ at 6/10/2024 1541    Acceptance, E,TB,D, VU by CB at 6/1/2024 2218   Significant Other Acceptance, E,TB,  VU,NR by  at 6/21/2024 0184                                         User Key       Initials Effective Dates Name Provider Type Discipline    [facility] 06/16/21 -  Kaitlynn Cohn, PT Physical Therapist PT    PC 06/16/21 -  Gemma Hamilton, PT Physical Therapist PT    EM 06/16/21 -  Nila Toro, PT Physical Therapist PT    EB 02/14/23 -  Gina Fortune, PTA Physical Therapist Assistant PT    DJ 10/25/19 -  Jacqueline Lizarraga, PT Physical Therapist PT    CB 02/21/24 -  Murphy Herndon, RN Registered Nurse Nurse    CB1 10/22/21 -  Maria Ines Florez, PT Physical Therapist PT    ST 09/22/22 -  Sahara Harrison, PT Physical Therapist PT                  PT Recommendation and Plan     Plan of Care Reviewed With: patient, spouse  Outcome Evaluation: Pt standing at [facility] with OT and aide present upon PT entering room. PT assisted patient and provided cues for taking steps from bsc to bed. Pt was also able to take some steps forward and back with heavy cues and assistance for sequencing and safety. PT luis angel continue to follow to address strength, mobility, and gait. While patient does demonstrate some functional improvements this date, PT continues to recommend SNF upon DC.     Time Calculation:         PT Charges       Row Name 06/24/24 1609             Time Calculation    Start Time 1417  -      Stop Time 1433  -      Time Calculation (min) 16 min  -      PT Received On 06/24/24  -      PT - Next Appointment 06/26/24  -         Time Calculation- PT    Total Timed Code Minutes- PT 16 minute(s)  -CH         Timed Charges    56395 - PT Therapeutic Activity Minutes 16  -CH         Total Minutes    Timed Charges Total Minutes 16  -CH       Total Minutes 16  -CH                User Key  (r) = Recorded By, (t) = Taken By, (c) = Cosigned By      Initials Name Provider Type    CH Kaitlynn Cohn, PT Physical Therapist                  Therapy Charges for Today       Code Description Service Date Service Provider Modifiers Qty     [id number]  PT THERAPEUTIC ACT EA 15 MIN 6/24/2024 Kaitlynn Cohn, PT GP 1            PT G-Codes  Outcome Measure Options: AM-PAC 6 Clicks Basic Mobility (PT)  AM-PAC 6 Clicks Score (PT): 11  AM-PAC 6 Clicks Score (OT): 10  Modified [place] Scale: 4 - Moderately severe disability.  Unable to walk without assistance, and unable to attend to own bodily needs without assistance.  PT Discharge Summary  Anticipated Discharge Disposition (PT): skilled nursing facility    Kaitlynn Cohn, SHEFALI  6/24/2024

## 2024-06-24 NOTE — PROGRESS NOTES
Hospital Follow Up    LOS: 25  Patient Name: Dilip Verma  Age/Sex: 74 y.o. male  : 1950  MRN: [id number]    Day of Service: 24   Length of Stay: 25  Encounter Provider: Payam Larry MD  Place of Service: [facility] CARDIOLOGY  Patient Care Team:  Fernando Camargo DO as PCP - General (Family Medicine)  Morris Cai MD as Consulting Physician (Sleep Medicine)  Michaela Hylton MD as Consulting Physician (Cardiology)  Hardy Banerjee MD as Consulting Physician (Ophthalmology)  Chula Christianson MD as Consulting Physician (Ophthalmology)  Jason Sherman MD (Endocrinology)  Perla Mayen MD as Consulting Physician (Nephrology)  Federico Hebert MD as Consulting Physician (Pulmonary Disease)  Niraj Ravi MD as Consulting Physician (Orthopedic Surgery)  Papa Velasco MD as Consulting Physician (Urology)  Terese Yost MD as Consulting Physician (Gastroenterology)  Aracelis Ball MD as Consulting Physician (Colon and Rectal Surgery)  [facility] as Primary Care Provider    Subjective:     Chief Complaint: Question amyloid heart    Interval History: Patient continues to slowly improve.  Patient is still pretty weak.  We are asked to reconsult for possible amyloid heart.    Objective:     Objective:  Temp:  [97.3 °F (36.3 °C)-98.6 °F (37 °C)] 98.6 °F (37 °C)  Heart Rate:  [58-68] 60  Resp:  [18] 18  BP: (104-179)/(55-78) 150/66     Intake/Output Summary (Last 24 hours) at 2024 1126  Last data filed at 2024 0327  Gross per 24 hour   Intake 100 ml   Output 1750 ml   Net -1650 ml     Body mass index is 28.49 kg/m².      24  0613 24  0927 24  0430   Weight: 91.8 kg (202 lb 6.1 oz) 89.1 kg (196 lb 6.9 oz) 85 kg (187 lb 6.3 oz)     Weight change:       Physical Exam:   General :  Alert, cooperative, in no acute distress.  Neuro:   Alert,cooperative and oriented.  Lungs:  CTAB. Normal  "respiratory effort and rate.  CV:  Regular rate and rhythm, normal S1 and S2, no murmurs, gallops or rubs.   ABD:  Soft, nontender, nondistended. Positive bowel sounds.  Extr:  No edema or cyanosis, moves all extremities.    Lab Review:   Results from last 7 days   Lab Units 06/24/24  0640 06/23/24  0639   SODIUM mmol/L 138 140   POTASSIUM mmol/L 4.5 4.7   CHLORIDE mmol/L 101 103   CO2 mmol/L 29.1* 29.0   BUN mg/dL 42* 41*   CREATININE mg/dL 3.11* 2.95*   GLUCOSE mg/dL 177* 133*   CALCIUM mg/dL 8.7 8.4*         Results from last 7 days   Lab Units 06/22/24  1114 06/18/24  0639   WBC 10*3/mm3 8.29 6.79   HEMOGLOBIN g/dL 10.2* 10.5*   HEMATOCRIT % 32.1* 31.9*   PLATELETS 10*3/mm3 229 256     Results from last 7 days   Lab Units 06/24/24  0640 06/23/24  0639   APTT seconds 56.1* 84.7*               Invalid input(s): \"LDLCALC\"            Current Medications:   Scheduled Meds:amiodarone, 200 mg, Oral, Q24H  [Held by provider] apixaban, 5 mg, Oral, Q12H  aspirin, 81 mg, Oral, Daily  brimonidine, 1 drop, Both Eyes, BID  bumetanide, 1 mg, Oral, BID  DULoxetine, 30 mg, Oral, Daily  guaiFENesin, 600 mg, Oral, Q12H  insulin lispro, 2-9 Units, Subcutaneous, 4x Daily AC & at Bedtime  ipratropium-albuterol, 3 mL, Nebulization, 4x Daily - RT  latanoprost, 1 drop, Both Eyes, Nightly  Menthol-Zinc Oxide, 1 Application, Topical, Q12H  metoprolol tartrate, 12.5 mg, Oral, Q12H  OLANZapine, 5 mg, Oral, Nightly  polyethylene glycol, 17 g, Oral, BID  senna-docusate sodium, 2 tablet, Oral, BID  sodium chloride, 10 mL, Intravenous, Q12H  sodium chloride, 10 mL, Intravenous, Q12H  sodium chloride, 4 mL, Nebulization, BID - RT  terazosin, 1 mg, Oral, Nightly  thiamine, 100 mg, Oral, Daily      Continuous Infusions:heparin, 10.4 Units/kg/hr, Last Rate: 17.4 Units/kg/hr (06/24/24 1101)        Allergies:  Allergies   Allergen Reactions    Ace Inhibitors Angioedema    Angiotensin Receptor Blockers Angioedema and Unknown (See Comments)     " Angioneurotic edema    Dapagliflozin Other (See Comments)     UTI,  rectal abcess    Losartan Angioedema     Angioneurotic edema    Seroquel [Quetiapine] Hallucinations    Xanax [Alprazolam] Unknown - High Severity    Amlodipine Swelling    Ativan [Lorazepam] Hallucinations    Baclofen Hallucinations    Misc. Sulfonamide Containing Compounds Unknown (See Comments)    Minoxidil Confusion    Tetracycline Rash     bliisters in mouth         Assessment:       Acute UTI (urinary tract infection)    Type 2 diabetes mellitus with hyperglycemia, with long-term current use of insulin    CAD (coronary artery disease)    Hx of CABG    Chronic diastolic heart failure    CSA (central sleep apnea)    HTN (hypertension)    History of stroke    CKD (chronic kidney disease) stage 3, GFR 30-59 ml/min    Peripheral arterial disease    Sepsis    Hyperkalemia    Metabolic encephalopathy    AMENA (acute kidney injury)    Paroxysmal atrial fibrillation        Plan:   1.  Paroxysmal atrial fibrillation.  2.  Urosepsis  3.  Cellulitis  4.  Coronary artery disease stable  5.  There is a concern of amyloid cardiomyopathy.  Patient has had hypertension now for over 12 years it was difficult to control.  Patient also has renal disease.  He acutely went on dialysis but now is back off.  As per renal's note there are no signs of heavy or light chains on his UPEP.  I personally reviewed it and agree there is no M spike.  In light of that it is very highly unlikely he has amyloid heart.  However in his overall picture of his medical issues I am not sure he would treat it even if he did.  In either case it could be assessed as an outpatient but at the current time I do not believe that it is worthwhile to workup any further.  I did review the echocardiogram the muscle is thickened it is echogenic but with no signs of any M spike it is extremely unlikely.  I will sign off any further questions please contact us patient is to follow-up in my office in 4  to 6 weeks after discharge.        Payam Larry MD  06/24/24  11:26 EDT

## 2024-06-24 NOTE — NURSING NOTE
Patient and patient's wife repeatedly attempted to move patient to bedside commode without calling staff and with the bed alarm sounding. Patient and patient's wife were educated each time that patient could not get out of bed unless two staff members were present and that if he needed to get up to bedside commode he must use call light. Wife was instructed that she should not assist patient out of bed.   This RN provided and reinforced this education each time, along with fall and safety education. This RN informed Charge RN of the situation and Charge RN also went into patient's room and reinforced education and hospital policy with patient and patient's wife.     Bed alarm on, bed in lowest position, and call light within reach.

## 2024-06-24 NOTE — PROGRESS NOTES
PROGRESS NOTE      Patient Name: Dilip Verma  : 1950  MRN: [id number]  Primary Care Physician: Fernando Camargo DO  Date of admission: 2024    Patient Care Team:  Fernando Camargo DO as PCP - General (Family Medicine)  Morris Cai MD as Consulting Physician (Sleep Medicine)  Michaela Hylton MD as Consulting Physician (Cardiology)  Hardy Banerjee MD as Consulting Physician (Ophthalmology)  Chula Christianson MD as Consulting Physician (Ophthalmology)  Jason Sherman MD (Endocrinology)  Perla Mayen MD as Consulting Physician (Nephrology)  Federico Hebert MD as Consulting Physician (Pulmonary Disease)  Niraj Ravi MD as Consulting Physician (Orthopedic Surgery)  Papa Velasco MD as Consulting Physician (Urology)  Terese Yost MD as Consulting Physician (Gastroenterology)  Aracelis Ball MD as Consulting Physician (Colon and Rectal Surgery)  [facility] [place] as Primary Care Provider        Reason for Follow up:     AMENA, CKD III      Subjective:     Patient seen and examined, more awake, eating breakfast, no new issues.  S/p dialysis 24  Renal function continues to improve.   Creatinine is stable    Review of Systems:         Constitutional: weakness.  HEENT:  No headache, otalgia, itchy eyes, nasal discharge or sore throat.  Cardiac:  No chest pain, dyspnea, orthopnea or PND.  Chest:  No cough, phlegm or wheezing.  Abdomen:  No abdominal pain, nausea or vomiting.  Neuro:  No focal weakness, abnormal movements or seizure-like activity.  :   No hematuria, no pyuria, no dysuria, no flank pain.  Extremities:  No  joint pains.  ROS was otherwise negative except as mentioned in the [place].    Social History:  reports that he quit smoking about 24 years ago. His smoking use included cigarettes. He started smoking about 40 years ago. He has a 12 pack-year smoking history. He has been exposed to tobacco smoke. He has never used smokeless  tobacco. He reports that he does not currently use alcohol. He reports that he does not use drugs.    Medications:  Prior to Admission medications    Medication Sig Start Date End Date Taking? Authorizing Provider   aspirin 81 MG EC tablet Take 1 tablet by mouth Every Night.   Yes Heri Rinaldi MD   brimonidine (ALPHAGAN) 0.2 % ophthalmic solution Administer 1 drop to both eyes 2 (Two) Times a Day.   Yes Heri Rinaldi MD   bumetanide (BUMEX) 1 MG tablet Take 1 tablet by mouth Daily.   Yes Heri Rinaldi MD   Cholecalciferol (Vitamin D-3) 125 MCG (5000 UT) tablet Take 1 tablet by mouth Daily.   Yes Heri Rinaldi MD   DULoxetine (CYMBALTA) 30 MG capsule Take 1 capsule by mouth Every Night. 1/16/24  Yes Heri Rinaldi MD   Insulin Glargine, 2 Unit Dial, (TOUJEO) 300 UNIT/ML solution pen-injector injection Inject 24 Units under the skin into the appropriate area as directed Daily.   Yes Heri Rinaldi MD   insulin lispro (humaLOG) 100 UNIT/ML injection Inject 0-14 Units under the skin into the appropriate area as directed 3 (Three) Times a Day Before Meals.  Patient taking differently: Inject 0-14 Units under the skin into the appropriate area as directed 3 (Three) Times a Day Before Meals. SLIDING SCALE  100-150 - 4 units  151-200 - 5 units  201-250 - 6 units  251-300 - 7 units  301-350 - 8 units  351-400 - 9 units  401+ - 10 units 12/7/20  Yes Amador Valverde MD   latanoprost (XALATAN) 0.005 % ophthalmic solution Administer 1 drop to both eyes every night at bedtime. 1/16/23  Yes Heri Rinaldi MD   multivitamin with minerals (MULTIVITAMIN ADULTS PO) Take 1 tablet by mouth Daily.   Yes Heri Rinaldi MD   testosterone (ANDROGEL) 25 MG/2.5GM (1%) gel gel Place 25 mg on the skin as directed by provider 2 (Two) Times a Week.   Yes Heri Rinaldi MD   traMADol (ULTRAM) 50 MG tablet Take 1 tablet by mouth At Night As Needed. 10/16/23  Yes Gentry  MD Heri   Magnesium 400 MG capsule Take 400 mg by mouth As Needed.    ProviderHeri MD   sildenafil (REVATIO) 20 MG tablet Take 1 tablet by mouth As Needed.    Provider, MD Heri     Scheduled Meds:amiodarone, 200 mg, Oral, Q24H  [Held by provider] apixaban, 5 mg, Oral, Q12H  aspirin, 81 mg, Oral, Daily  brimonidine, 1 drop, Both Eyes, BID  bumetanide, 1 mg, Oral, BID  DULoxetine, 30 mg, Oral, Daily  guaiFENesin, 600 mg, Oral, Q12H  insulin lispro, 2-9 Units, Subcutaneous, 4x Daily AC & at Bedtime  ipratropium-albuterol, 3 mL, Nebulization, 4x Daily - RT  latanoprost, 1 drop, Both Eyes, Nightly  Menthol-Zinc Oxide, 1 Application, Topical, Q12H  metoprolol tartrate, 12.5 mg, Oral, Q12H  OLANZapine, 5 mg, Oral, Nightly  polyethylene glycol, 17 g, Oral, BID  senna-docusate sodium, 2 tablet, Oral, BID  sodium chloride, 10 mL, Intravenous, Q12H  sodium chloride, 10 mL, Intravenous, Q12H  sodium chloride, 4 mL, Nebulization, BID - RT  terazosin, 1 mg, Oral, Nightly  thiamine, 100 mg, Oral, Daily      Continuous Infusions:heparin, 10.4 Units/kg/hr, Last Rate: 17.4 Units/kg/hr (06/24/24 1101)          PRN Meds:  acetaminophen **OR** acetaminophen **OR** acetaminophen    senna-docusate sodium **AND** polyethylene glycol **AND** bisacodyl **AND** bisacodyl    dextrose    dextrose    glucagon (human recombinant)    heparin (porcine)    midodrine    nitroglycerin    OLANZapine    ondansetron    sodium chloride    sodium chloride    sodium chloride    sodium chloride  Allergies:    Allergies   Allergen Reactions    Ace Inhibitors Angioedema    Angiotensin Receptor Blockers Angioedema and Unknown (See Comments)     Angioneurotic edema    Dapagliflozin Other (See Comments)     UTI,  rectal abcess    Losartan Angioedema     Angioneurotic edema    Seroquel [Quetiapine] Hallucinations    Xanax [Alprazolam] Unknown - High Severity    Amlodipine Swelling    Ativan [Lorazepam] Hallucinations    Baclofen  "Hallucinations    Misc. Sulfonamide Containing Compounds Unknown (See Comments)    Minoxidil Confusion    Tetracycline Rash     bliisters in mouth         Objective   Exam:     Vital Signs  Temp:  [97.3 °F (36.3 °C)-98.6 °F (37 °C)] 98.6 °F (37 °C)  Heart Rate:  [55-68] 60  Resp:  [16-18] 18  BP: (104-179)/(55-78) 150/66  SpO2:  [92 %-96 %] 95 %  on  Flow (L/min):  [3] 3;   Device (Oxygen Therapy): nasal cannula  Body mass index is 28.49 kg/m².       Exam:     /66 (BP Location: Left arm, Patient Position: Sitting)   Pulse 60   Temp 98.6 °F (37 °C) (Oral)   Resp 18   Ht 172.7 cm (68\")   Wt 85 kg (187 lb 6.3 oz)   SpO2 95%   BMI 28.49 kg/m²     General Appearance:    Appears chronically ill, no distress   Head:    Normocephalic, atraumatic   Eyes:     EOM's intact, sclerae anicteric        Ears:    TMs not observed   Nose:   Patent without discharge   Neck:   Supple, JVD   Lungs:     Clear to auscultation bilaterally, respiratory effort is normal   Chest wall:    No tenderness   Heart:    Regular rate and rhythm, S1 and S2 normal, no   rub    or gallop   Abdomen:     Soft, nontender, nondistended,  no masses, no organomegaly   Extremities:   No edema   Neurologic:  No focal deficits.  Speech is fluent.  Conversation is coherent.      Results Review:  I have personally reviewed most recent Data :  [facility]  CBC    Results from last 7 days   Lab Units 06/22/24  1114 06/18/24  0639   WBC 10*3/mm3 8.29 6.79   HEMOGLOBIN g/dL 10.2* 10.5*   PLATELETS 10*3/mm3 229 256     CMP   Results from last 7 days   Lab Units 06/24/24  0640 06/23/24  0639 06/22/24  0627 06/21/24  0732 06/20/24  0822 06/19/24  0753 06/18/24  0639   SODIUM mmol/L 138 140 139 142 139 139 138   POTASSIUM mmol/L 4.5 4.7 4.6 4.7 4.4 4.2 4.4   CHLORIDE mmol/L 101 103 106 106 104 105 105   CO2 mmol/L 29.1* 29.0 25.5 28.0 27.0 24.0 20.6*   BUN mg/dL 42* 41* 37* 40* 38* 35* 27*   CREATININE mg/dL 3.11* 2.95* 3.31* 3.45* 3.36* " 3.64* 3.46*   GLUCOSE mg/dL 177* 133* 214* 170* 195* 135* 111*     ABG            XR Chest PA & Lateral    Result Date: 6/21/2024   1. Stable right IJ central venous catheter. 2. Low lung volumes with similar-appearing left greater than right bilateral perihilar and bibasilar opacities that could represent atelectasis and/or pneumonia with possible small pleural effusions and possible superimposed mild pulmonary vascular congestion/pulmonary edema.  This report was finalized on 6/21/2024 1:05 PM by Julio Thomas MD on Workstation: [id number]       Results for orders placed during the hospital encounter of 05/30/24    Adult Transthoracic Echo Complete W/ Cont if Necessary Per Protocol    Interpretation Summary    Left ventricular systolic function is hyperdynamic (EF > 70%). Calculated left ventricular EF = 75% Global longitudinal LV strain (GLS) = -17.8%. Left ventricle strain data was reviewed by the physician and found to be accurate. Global longitudinal LV strain is normal however there is regional abnormality with apical sparing suggestive of amyloid heart disease. However there is also basal ptal hypertrophy suggestive of hypertrophic cardiomyopathy. Wall motion abnormality is also noted in the basal septum and cannot rule out ischemic component.Consider additional imaging such as cardiac MRI and further evaluation also for amyloid heart disease as clinically indicated. The left ventricular cavity is small in size. Left ventricular wall thickness is consistent with moderate to severe septal asymmetric hypertrophy. There is hypokinesis of the left ventricular basal septum. Left ventricular diastolic function is consistent with (grade II w/high LAP) pseudonormalization.    The left atrial cavity is mildly dilated.    No aortic valve regurgitation or stenosis is present. The aortic valve is abnormal in structure. There is mild thickening of the aortic valve.    There is mild, bileaflet mitral valve thickening  present. Trace mitral valve regurgitation is present. No significant mitral valve stenosis is present.    Moderate tricuspid valve regurgitation is present. Estimated right ventricular systolic pressure from tricuspid regurgitation is markedly elevated (>55 mmHg). Calculated right ventricular systolic pressure from tricuspid regurgitation is 74 mmHg.        Assessment & Plan   Assessment and Plan:         Acute UTI (urinary tract infection)    Type 2 diabetes mellitus with hyperglycemia, with long-term current use of insulin    CAD (coronary artery disease)    Hx of CABG    Chronic diastolic heart failure    CSA (central sleep apnea)    HTN (hypertension)    History of stroke    CKD (chronic kidney disease) stage 3, GFR 30-59 ml/min    Peripheral arterial disease    Sepsis    Hyperkalemia    Metabolic encephalopathy    AMENA (acute kidney injury)    Paroxysmal atrial fibrillation    ASSESSMENT:  AMENA likely prerenal ATN 2/2 urosepsis with hemodynamic changes; on CKD III etiology likely diabetic and hypertensive nephropathy with baseline sCr ~0.9-1.2 mg/dL  Hyperkalemia, now resolved  UTI, urine cultures with gram neg bacilli  Leukocytosis  Metabolic encephalopathy  PAD  Hx stroke  HTN  CHF  CAD  DM2  Chronic splenic vein thrombosis  Possible hepatocellular disease/cirrhosis  Likely benign subpleural nodule with bilateral lymphadenopathy  Hiatal hernia    Last TTE 10/5/22 with EF 66%, grade II DD    PLAN :     AMENA likely prerenal ATN 2/2 urosepsis with hemodynamic changes; on CKD III etiology likely diabetic and hypertensive nephropathy with baseline sCr ~0.9-1.2 mg/dL  Initiated on dialysis  6/15 due to worsening renal function.   HD performed on 6/17/24  Renal function is a stable now, creatinine is 3.1 mg/dL  Dialysis catheter was removed June 23, 2024  Hopefully no more need for dialysis  Patient is more alert oriented and clinically patient getting better  Paraproteinemia workup unremarkable  Likely related to some  cardiorenal syndrome as echocardiogram consistent with possible amyloidosis with grade 2 diastolic dysfunctions  Continue bumex 1 mg po bid  Remains nonoliguric  May need further workup and treatment for cardiac amyloidosis  Avoid NSAIDs, nephrotoxic agents.   We will follow and coordinate with team  Monitor closely for renal recovery  Discussed with family at bedside

## 2024-06-24 NOTE — PLAN OF CARE
Goal Outcome Evaluation:  Plan of Care Reviewed With: patient, spouse           Outcome Evaluation: Pt standing at bs with OT and aide present upon PT entering room. PT assisted patient and provided cues for taking steps from bsc to bed. Pt was also able to take some steps forward and back with heavy cues and assistance for sequencing and safety. PT luis angel continue to follow to address strength, mobility, and gait. While patient does demonstrate some functional improvements this date, PT continues to recommend SNF upon DC.      Anticipated Discharge Disposition (PT): skilled nursing facility

## 2024-06-24 NOTE — PLAN OF CARE
Goal Outcome Evaluation:                 Pt is alert and oriented x 4 currently, VSS on 3 LNC. Pt reports no SOB during shift, no CP. Pt reported dizziness when repositioning. Pt had 1 large soft BM at  the beginning of shift. Purewick was applied to pt after cleansing perineum, pt had adequate UOP. Pt still on heparin gtt at 15.7 ml/hr, therapeutic range, awaiting results of daily PTT, bleeding precautions maintained, infection control maintained. Spouse at bedside, attentive, supportive.

## 2024-06-24 NOTE — PROGRESS NOTES
Name: Dilip Verma ADMIT: 2024   : 1950  PCP: Fernando Camargo DO    MRN: [id number] LOS: 25 days   AGE/SEX: 74 y.o. male  ROOM: [facility]     Subjective   Subjective   No acute events overnight.  Patient states that he is feeling better this morning.  Denies chest pain or shortness of breath.    Objective   Objective     Vital Signs  Temp:  [97.3 °F (36.3 °C)-98.6 °F (37 °C)] 98.6 °F (37 °C)  Heart Rate:  [60-68] 60  Resp:  [18] 18  BP: (124-179)/(59-78) 150/66  SpO2:  [93 %-95 %] 95 %  on  Flow (L/min):  [3] 3;   Device (Oxygen Therapy): nasal cannula  Body mass index is 28.49 kg/m².    Physical Exam  General: Alert, no acute distress.  Lying in bed.  Chronically ill-appearing.  ENT: No conjunctival injection or scleral icterus. Moist mucous membranes.   Neuro: Eyes open and moving in all directions, generalized weakness, no focal deficits.   Lungs: Clear to auscultation bilaterally. No wheeze or crackles. No distress.   Heart: RRR, no murmurs. No edema.  Abdomen: Soft, non-tender, non-distended. Normal bowel sounds.   Ext: Warm and well-perfused. No edema.   Skin: No rashes or lesions. IV site without swelling or erythema.     Results Review     I reviewed the patient's new clinical results:  Results from last 7 days   Lab Units 24  1114 24  0639   WBC 10*3/mm3 8.29 6.79   HEMOGLOBIN g/dL 10.2* 10.5*   PLATELETS 10*3/mm3 229 256     Results from last 7 days   Lab Units 24  0640 24  0639 24  0627 24  0732   SODIUM mmol/L 138 140 139 142   POTASSIUM mmol/L 4.5 4.7 4.6 4.7   CHLORIDE mmol/L 101 103 106 106   CO2 mmol/L 29.1* 29.0 25.5 28.0   BUN mg/dL 42* 41* 37* 40*   CREATININE mg/dL 3.11* 2.95* 3.31* 3.45*   GLUCOSE mg/dL 177* 133* 214* 170*   EGFR mL/min/1.73 20.1* 21.4* 18.7* 17.8*       Results from last 7 days   Lab Units 24  0640 24  0639 24  0627 24  0732   CALCIUM mg/dL 8.7 8.4* 8.5* 8.5*       Glucose   Date/Time Value Ref Range  Status   06/24/2024 1107 189 (H) 70 - 130 mg/dL Final   06/24/2024 0640 182 (H) 70 - 130 mg/dL Final   06/23/2024 2002 176 (H) 70 - 130 mg/dL Final   06/23/2024 1704 157 (H) 70 - 130 mg/dL Final   06/23/2024 1134 202 (H) 70 - 130 mg/dL Final   06/23/2024 0547 129 70 - 130 mg/dL Final   06/22/2024 2020 187 (H) 70 - 130 mg/dL Final       No radiology results for the last day    I have personally reviewed all medications:  Scheduled Medications  amiodarone, 200 mg, Oral, Q24H  [Held by provider] apixaban, 5 mg, Oral, Q12H  aspirin, 81 mg, Oral, Daily  brimonidine, 1 drop, Both Eyes, BID  bumetanide, 1 mg, Oral, BID  DULoxetine, 30 mg, Oral, Daily  guaiFENesin, 600 mg, Oral, Q12H  insulin lispro, 2-9 Units, Subcutaneous, 4x Daily AC & at Bedtime  ipratropium-albuterol, 3 mL, Nebulization, 4x Daily - RT  latanoprost, 1 drop, Both Eyes, Nightly  Menthol-Zinc Oxide, 1 Application, Topical, Q12H  metoprolol tartrate, 12.5 mg, Oral, Q12H  OLANZapine, 5 mg, Oral, Nightly  polyethylene glycol, 17 g, Oral, BID  senna-docusate sodium, 2 tablet, Oral, BID  sodium chloride, 10 mL, Intravenous, Q12H  sodium chloride, 10 mL, Intravenous, Q12H  sodium chloride, 4 mL, Nebulization, BID - RT  terazosin, 1 mg, Oral, Nightly  thiamine, 100 mg, Oral, Daily    Infusions  heparin, 10.4 Units/kg/hr, Last Rate: 17.4 Units/kg/hr (06/24/24 1101)    Diet  Diet: Regular/House, Diabetic, Cardiac; Healthy Heart (2-3 Na+); Consistent Carbohydrate; Texture: Regular (IDDSI 7); Fluid Consistency: Thin (IDDSI 0)      Intake/Output Summary (Last 24 hours) at 6/24/2024 1522  Last data filed at 6/24/2024 1200  Gross per 24 hour   Intake 340 ml   Output 1950 ml   Net -1610 ml       Assessment/Plan     Active Hospital Problems    Diagnosis  POA    **Acute UTI (urinary tract infection) [N39.0]  Yes    Paroxysmal atrial fibrillation [I48.0]  Yes    AMENA (acute kidney injury) [N17.9]  Yes    Sepsis [A41.9]  Yes    Hyperkalemia [E87.5]  Yes    Metabolic  encephalopathy [G93.41]  Yes    Peripheral arterial disease [I73.9]  Yes    History of stroke [Z86.73]  Not Applicable    CKD (chronic kidney disease) stage 3, GFR 30-59 ml/min [N18.30]  Yes    HTN (hypertension) [I10]  Yes    CSA (central sleep apnea) [G47.31]  Yes    Chronic diastolic heart failure [I50.32]  Yes    Hx of CABG [Z95.1]  Not Applicable    CAD (coronary artery disease) [I25.10]  Yes    Type 2 diabetes mellitus with hyperglycemia, with long-term current use of insulin [E11.65, Z79.4]  Not Applicable      Resolved Hospital Problems   No resolved problems to display.       74 y.o. male with Acute UTI (urinary tract infection).    Acute UTI/LLE Cellulitis  -Rocephin 6/30 - 6/2, Zosyn 6/2 - 6/3, cefepime 6/3 - 6/8 (concerned that this caused encephalopathy), vancomycin 6/15 - 6/19   -Urine culture with Serratia sensitive to Rocephin and Zosyn and cefepime  -No evidence of infection return, patient has been seen during the hospital stay by Dr Robe BECKWITH  -Nephrology on board, follow management recommendations.  -Creatinine slightly increased today but overall trend is stable  -Central line removed yesterday, no further planned dialysis  -Bumex 1 mg oral twice daily  -Monitor with daily BMP     HTN/CAD/PAD/New Onset Afib (PAF)/Chronic Diastolic CHF  - BP acceptable, no anginal symptoms, in NSR  - continue on current regimen  -Transition heparin drip to Eliquis today, no further procedures planned     Type 2 DM  -Hypoglycemia earlier on in hospitalization with Lantus  -Blood glucose trend overall acceptable at this time  -Continue SSI     Hypoxia  -Patient is on oxygen at night at home  -Currently on 3 L of oxygen, stable over the past several days  -Wean as able     Delirium/Toxic Encephalopathy  - continue on scheduled and PRN zyprexa  - he is current with palliatus at home and takes PRN tramadol, this has been restarted  -Mental status seems to have returned to baseline    Heparin drip for DVT  prophylaxis.  Transition to Eliquis today.  Full code.  Discussed with patient, spouse, and nursing staff.  Anticipate discharge to SNU facility timing yet to be determined.      Nabila Flynn MD  [facility]  06/24/24  15:22 EDT

## 2024-06-25 ENCOUNTER — READMISSION MANAGEMENT (OUTPATIENT)
Dept: CALL CENTER | Facility: HOSPITAL | Age: 74
End: 2024-06-25
Payer: MEDICARE

## 2024-06-25 VITALS
TEMPERATURE: 98.2 F | BODY MASS INDEX: 28.4 KG/M2 | SYSTOLIC BLOOD PRESSURE: 113 MMHG | HEART RATE: 61 BPM | RESPIRATION RATE: 16 BRPM | HEIGHT: 68 IN | WEIGHT: 187.39 LBS | DIASTOLIC BLOOD PRESSURE: 58 MMHG | OXYGEN SATURATION: 95 %

## 2024-06-25 LAB
ANION GAP SERPL CALCULATED.3IONS-SCNC: 6 MMOL/L (ref 5–15)
BUN SERPL-MCNC: 42 MG/DL (ref 8–23)
BUN/CREAT SERPL: 16 (ref 7–25)
CALCIUM SPEC-SCNC: 8.2 MG/DL (ref 8.6–10.5)
CHLORIDE SERPL-SCNC: 104 MMOL/L (ref 98–107)
CO2 SERPL-SCNC: 31 MMOL/L (ref 22–29)
CREAT SERPL-MCNC: 2.63 MG/DL (ref 0.76–1.27)
DEPRECATED RDW RBC AUTO: 45.5 FL (ref 37–54)
EGFRCR SERPLBLD CKD-EPI 2021: 24.6 ML/MIN/1.73
ERYTHROCYTE [DISTWIDTH] IN BLOOD BY AUTOMATED COUNT: 13 % (ref 12.3–15.4)
GLUCOSE BLDC GLUCOMTR-MCNC: 170 MG/DL (ref 70–130)
GLUCOSE BLDC GLUCOMTR-MCNC: 233 MG/DL (ref 70–130)
GLUCOSE SERPL-MCNC: 181 MG/DL (ref 65–99)
HCT VFR BLD AUTO: 31.1 % (ref 37.5–51)
HGB BLD-MCNC: 10 G/DL (ref 13–17.7)
MCH RBC QN AUTO: 31.1 PG (ref 26.6–33)
MCHC RBC AUTO-ENTMCNC: 32.2 G/DL (ref 31.5–35.7)
MCV RBC AUTO: 96.6 FL (ref 79–97)
PLATELET # BLD AUTO: 207 10*3/MM3 (ref 140–450)
PMV BLD AUTO: 10.2 FL (ref 6–12)
POTASSIUM SERPL-SCNC: 4.1 MMOL/L (ref 3.5–5.2)
RBC # BLD AUTO: 3.22 10*6/MM3 (ref 4.14–5.8)
SODIUM SERPL-SCNC: 141 MMOL/L (ref 136–145)
WBC NRBC COR # BLD AUTO: 6.69 10*3/MM3 (ref 3.4–10.8)

## 2024-06-25 PROCEDURE — 82948 REAGENT STRIP/BLOOD GLUCOSE: CPT

## 2024-06-25 PROCEDURE — 94799 UNLISTED PULMONARY SVC/PX: CPT

## 2024-06-25 PROCEDURE — 80048 BASIC METABOLIC PNL TOTAL CA: CPT

## 2024-06-25 PROCEDURE — 63710000001 INSULIN LISPRO (HUMAN) PER 5 UNITS: Performed by: INTERNAL MEDICINE

## 2024-06-25 PROCEDURE — 94664 DEMO&/EVAL PT USE INHALER: CPT

## 2024-06-25 PROCEDURE — 94761 N-INVAS EAR/PLS OXIMETRY MLT: CPT

## 2024-06-25 PROCEDURE — 94760 N-INVAS EAR/PLS OXIMETRY 1: CPT

## 2024-06-25 PROCEDURE — 85027 COMPLETE CBC AUTOMATED: CPT | Performed by: STUDENT IN AN ORGANIZED HEALTH CARE EDUCATION/TRAINING PROGRAM

## 2024-06-25 RX ORDER — LANOLIN ALCOHOL/MO/W.PET/CERES
100 CREAM (GRAM) TOPICAL DAILY
Qty: 30 TABLET | Refills: 0 | Status: SHIPPED | OUTPATIENT
Start: 2024-06-26

## 2024-06-25 RX ORDER — BUMETANIDE 1 MG/1
1 TABLET ORAL 2 TIMES DAILY
Qty: 60 TABLET | Refills: 0 | Status: SHIPPED | OUTPATIENT
Start: 2024-06-25 | End: 2024-06-27 | Stop reason: SDUPTHER

## 2024-06-25 RX ORDER — BENZOCAINE/MENTHOL 6 MG-10 MG
1 LOZENGE MUCOUS MEMBRANE 2 TIMES DAILY
Qty: 28 G | Refills: 0 | Status: SHIPPED | OUTPATIENT
Start: 2024-06-25

## 2024-06-25 RX ORDER — TERAZOSIN 1 MG/1
1 CAPSULE ORAL NIGHTLY
Qty: 30 CAPSULE | Refills: 0 | Status: SHIPPED | OUTPATIENT
Start: 2024-06-25

## 2024-06-25 RX ORDER — AMIODARONE HYDROCHLORIDE 200 MG/1
200 TABLET ORAL
Qty: 30 TABLET | Refills: 0 | Status: SHIPPED | OUTPATIENT
Start: 2024-06-26 | End: 2024-06-27 | Stop reason: SDUPTHER

## 2024-06-25 RX ADMIN — INSULIN LISPRO 4 UNITS: 100 INJECTION, SOLUTION INTRAVENOUS; SUBCUTANEOUS at 12:48

## 2024-06-25 RX ADMIN — INSULIN LISPRO 2 UNITS: 100 INJECTION, SOLUTION INTRAVENOUS; SUBCUTANEOUS at 10:19

## 2024-06-25 RX ADMIN — ASPIRIN 81 MG: 81 TABLET, CHEWABLE ORAL at 10:20

## 2024-06-25 RX ADMIN — Medication 4 ML: at 07:29

## 2024-06-25 RX ADMIN — Medication 100 MG: at 10:20

## 2024-06-25 RX ADMIN — BRIMONIDINE TARTRATE 1 DROP: 2 SOLUTION OPHTHALMIC at 10:20

## 2024-06-25 RX ADMIN — Medication 1 APPLICATION: at 10:22

## 2024-06-25 RX ADMIN — IPRATROPIUM BROMIDE AND ALBUTEROL SULFATE 3 ML: .5; 3 SOLUTION RESPIRATORY (INHALATION) at 11:32

## 2024-06-25 RX ADMIN — Medication 10 ML: at 10:23

## 2024-06-25 RX ADMIN — AMIODARONE HYDROCHLORIDE 200 MG: 200 TABLET ORAL at 10:19

## 2024-06-25 RX ADMIN — GUAIFENESIN 600 MG: 600 TABLET, EXTENDED RELEASE ORAL at 10:19

## 2024-06-25 RX ADMIN — IPRATROPIUM BROMIDE AND ALBUTEROL SULFATE 3 ML: .5; 3 SOLUTION RESPIRATORY (INHALATION) at 07:29

## 2024-06-25 RX ADMIN — APIXABAN 5 MG: 5 TABLET, FILM COATED ORAL at 10:19

## 2024-06-25 RX ADMIN — Medication 10 ML: at 10:22

## 2024-06-25 NOTE — NURSING NOTE
Paged LEIDY Nova to report pt requests Tramadol 50 mg PO for pain.  Pt has been receiving this for moderate pain and restlessness.  Pt c/o moderate pain in groin and has had multiple loose stools today x3 per dayshift RN and pt's spouse (at bedside) which has caused more excoriation to groin and corwin areas.

## 2024-06-25 NOTE — CASE MANAGEMENT/SOCIAL WORK
Continued Stay Note  [facility]     Patient Name: Dilip Verma  MRN: [id number]  Today's Date: 6/25/2024    Admit Date: 5/30/2024    [facility]   Discharge Plan       Row Name 06/25/24 1328       Plan    [facility]    Patient/Family in Agreement with Plan yes    Plan Comments Noted orders for d/c today, spoke with [facility], they are aware of discharge today, orders have been entered. Pt wife will transport home, she states she has help and a w/c and all the medical equipment at home to care for him. She is still refusing SNF and insists on taking him home. No further d/c needs per pt wife. - Nabila GONZALEZ                   Discharge Codes    No documentation.                 Expected Discharge Date and Time       Expected Discharge Date Expected Discharge Time    Jun 25, 2024               Nabila Ramsey RN

## 2024-06-25 NOTE — DISCHARGE SUMMARY
Patient Name: Dilip Verma  : 1950  MRN: [id number]    Date of Admission: 2024  Date of Discharge:  2024  Primary Care Physician: Fernando Camargo DO      Chief Complaint:   Altered Mental Status      Discharge Diagnoses     Active Hospital Problems    Diagnosis  POA    **Acute UTI (urinary tract infection) [N39.0]  Yes    Paroxysmal atrial fibrillation [I48.0]  Yes    AMENA (acute kidney injury) [N17.9]  Yes    Sepsis [A41.9]  Yes    Hyperkalemia [E87.5]  Yes    Metabolic encephalopathy [G93.41]  Yes    Peripheral arterial disease [I73.9]  Yes    History of stroke [Z86.73]  Not Applicable    CKD (chronic kidney disease) stage 3, GFR 30-59 ml/min [N18.30]  Yes    HTN (hypertension) [I10]  Yes    CSA (central sleep apnea) [G47.31]  Yes    Chronic diastolic heart failure [I50.32]  Yes    Hx of CABG [Z95.1]  Not Applicable    CAD (coronary artery disease) [I25.10]  Yes    Type 2 diabetes mellitus with hyperglycemia, with long-term current use of insulin [E11.65, Z79.4]  Not Applicable      Resolved Hospital Problems   No resolved problems to display.        Hospital Course     Mr. Verma is a 74 y.o. male with complicated past medical history who presented to [facility] initially complaining of dysuria and altered mental status.  Please see the admitting history and physical for further details.  He was found to have a urinary tract infection and was admitted to the hospital for further evaluation and treatment.      Initial infectious workup revealed urinary tract infection and left lower extremity cellulitis.  Cultures ultimately grew Serratia.  Infectious disease was consulted for further recommendations.  They did make antibiotic recommendations, and patient completed his course prior to discharge home.  He had resolution of symptoms with no additional evidence of new infection.  The patient's hospital stay was complicated by atrial fibrillation with RVR.  Cardiology was  consulted for further recommendations.  He initially required IV medications, but this was ultimately transitioned to an oral regimen.  Rate was well-controlled prior to discharge home.  The patient was on a heparin drip for anticoagulation initially, while ensuring no procedures were needed.  This was transition to Eliquis prior to discharge home, and patient tolerated this well.  He will need cardiology follow-up after discharge.  Hospital course was also complicated by encephalopathy.  It was thought that cefepime was the likely cause of this.  Upon discontinuation of this antibiotic, the patient did have improvement in his mental status.  In addition, he was found to have worsening renal function.  Nephrology was consulted for further evaluation.  Creatinine continued to increase, and the patient ultimately required hemodialysis.  Renal function did improve, and dialysis line was removed prior to discharge home.  Nephrology plans to follow-up with the patient as an outpatient.  Though creatinine has not normalized, it was downtrending at time of discharge with no additional hemodialysis completed.  There was conversation regarding whether or not the patient could have amyloid cardiomyopathy.  Protein workup did not indicate this, but the patient did have muscle thickening on echocardiogram.  Cardiology will continue to evaluate this as an outpatient, and they will arrange follow-up in 4 to 6 weeks.  Given his prolonged hospitalization, PT/OT did work with the patient.  Recommendation was for subacute rehab.  However, his wife ultimately decided that she would prefer to take him home with home health.  This was arranged.  The patient was appropriate for discharge home, and consulting services were in agreements.  He was counseled extensively on reasons to return to the hospital and conveyed understanding.  Recommend PCP follow-up within 1 week of hospital discharge.    Day of Discharge     Subjective:  No acute  events overnight.  Patient states that he is feeling well this morning.  Tolerated transitioning to Eliquis with no issues.  Denies any chest pain or shortness of breath.  Would like to be discharged home today.    Physical Exam:  Temp:  [97.9 °F (36.6 °C)-98.2 °F (36.8 °C)] 98.2 °F (36.8 °C)  Heart Rate:  [56-67] 61  Resp:  [15-18] 16  BP: ()/(50-72) 113/58  Body mass index is 28.49 kg/m².  Physical Exam  General: Alert, no acute distress.  Lying in bed.  Chronically ill-appearing.  ENT: No conjunctival injection or scleral icterus. Moist mucous membranes.   Neuro: Eyes open and moving in all directions, generalized weakness, no focal deficits.   Lungs: Clear to auscultation bilaterally. No wheeze or crackles. No distress.   Heart: RRR, no murmurs. No edema.  Abdomen: Soft, non-tender, non-distended. Normal bowel sounds.   Ext: Warm and well-perfused. No edema.   Skin: No rashes or lesions. IV site without swelling or erythema.     Consultants     Consult Orders (all) (From admission, onward)       Start     Ordered    06/24/24 0644  Inpatient Cardiology Consult  Once        Comments: Spoke with DAVE   Specialty:  Cardiology  Provider:  Payam Larry MD    06/24/24 0644    06/23/24 1920  Inpatient Cardiology Consult  Once,   Status:  Canceled        Specialty:  Cardiology  Provider:  Payam Larry MD    06/23/24 1920    06/15/24 1137  Inpatient Vascular Surgery Consult  Once        Specialty:  Vascular Surgery  Provider:  Dante Gates MD    06/15/24 1139    06/15/24 1017  Inpatient Pulmonology Consult  Once        Specialty:  Pulmonary Disease  Provider:  Zackery Haney MD    06/15/24 1017    06/10/24 1100  Inpatient Palliative Care Team Consult  Once        Provider:  (Not yet assigned)    06/10/24 1059    06/04/24 0702  Inpatient Cardiology Consult  IN AM        Specialty:  Cardiology  Provider:  Amador Reyes Jr., MD    06/04/24 0423    06/02/24 1011  Inpatient Infectious  Diseases Consult  Once        Specialty:  Infectious Diseases  Provider:  Tino Nagel MD    06/02/24 1013    05/31/24 0814  Inpatient Nephrology Consult  Once        Specialty:  Nephrology  Provider:  Cristóbal Mayen MD    05/31/24 0816    05/30/24 0818  A (on-call MD unless specified) Details  Once        Specialty:  Hospitalist  Provider:  Jaquan Grossman MD    05/30/24 0817                  Procedures     * Surgery not found *    Imaging Results (All)       Procedure Component Value Units Date/Time    XR Chest PA & Lateral [[id number]] Collected: 06/21/24 1302     Updated: 06/21/24 1308    Narrative:      XR CHEST PA AND LATERAL-     DATE OF EXAM: 6/21/2024 12:18 PM     INDICATION: Shortness of breath.     COMPARISON: Radiographs 6/15/2024, 6/14/2024, and 6/11/2024. CT  5/30/2024.     TECHNIQUE: Frontal and lateral views of the chest were obtained.     FINDINGS:  Stable sternotomy wires, postoperative changes from CABG, and right IJ  central venous catheter. Low lung volumes with similar-appearing left  greater than right bilateral perihilar and bibasilar opacities with  blunting of each costophrenic angle and obscuration of the diaphragm. No  pneumothorax. Unchanged cardiac and mediastinal contours. Flowing  multilevel lower thoracic osteophyte formation. No acute osseous  abnormality is identified.       Impression:         1. Stable right IJ central venous catheter.  2. Low lung volumes with similar-appearing left greater than right  bilateral perihilar and bibasilar opacities that could represent  atelectasis and/or pneumonia with possible small pleural effusions and  possible superimposed mild pulmonary vascular congestion/pulmonary  edema.     This report was finalized on 6/21/2024 1:05 PM by Julio Thomas MD on  Workstation: [id number]       XR Chest Post CVA Port [[id number]] Collected: 06/15/24 1717     Updated: 06/15/24 1726    Narrative:      Stat portable view of the chest on 6/15/2024      CLINICAL HISTORY: Status post right internal jugular Shiley catheter  placement     This correlated to prior portable chest x-ray yesterday on 6/14/2024  2:15 p.m.     FINDINGS: There is been interval placement of a right internal jugular  Shiley catheter. Its distal tip projects over the superior vena cava in  good position. No pneumothorax is identified. There are multiple sternal  wires from previous cardiac surgery. The cardiomediastinal silhouette is  upper limits of normal. The pulmonary vasculature is within normal  limits. There is prominent blunting left lateral costophrenic angle and  hazy increased density over the inferior left hemithorax felt to be  secondary to a small to moderate left and moderate size left pleural  effusion with some airspace consolidation adjacent to the effusion in  the left lower lung zone left lung base that may be compressive  atelectasis although cannot exclude superimposed pneumonia. The right  lung is clear and the right costophrenic angle is sharp.       Impression:      1. Since yesterday's portable chest x-ray on 6/14/2024 at 2:15 p.m.,  there is been interval placement of a right internal jugular central  line-Shiley catheter and its distal tip projects over the superior vena  cava in good position and no pneumothorax is seen. Otherwise, there has  been no change when compared to yesterday's chest x-ray 6/14/2024 at  2:15 p.m.     2. The patient's had previous median sternotomy there is prominent  blunting of the left lateral costophrenic angle and some hazy density  over the inferior left hemithorax likely secondary to at least a small  to moderate size up to moderate size left pleural effusion there is some  dense airspace consolidation in the left lower lung zone left lung base  likely compressive atelectasis associated with the left pleural effusion  although I cannot exclude superimposed pneumonia at the left lower lobe  or lung base and correlate clinically. No  additional active disease is  seen in the chest.     This report was finalized on 6/15/2024 5:23 PM by Dr. Jaquan Mendieta M.D  on Workstation: [id number]       FL Dialysis Catheter Placement w US Access (vascular surgeon) [[id number]] Resulted: 06/15/24 1600     Updated: 06/15/24 1600    Narrative:      This procedure was auto-finalized with no dictation required.    XR Chest 1 View [[id number]] Collected: 06/14/24 1438     Updated: 06/14/24 1445    Narrative:      XR CHEST 1 VW-     HISTORY: Male who is 74 years-old, acute kidney injury, sepsis     TECHNIQUE: Frontal view of the chest     COMPARISON: 6/11/2024     FINDINGS: The heart size is difficult to characterize owing to  obscuration of the left cardiac margin. Sternotomy wires are present.  Pulmonary vasculature is congested. Mild atelectasis or infiltrate at  the bases with mild left pleural effusion apparent, continued follow-up  suggested. No pneumothorax. No acute osseous process.       Impression:      As described.           This report was finalized on 6/14/2024 2:42 PM by Dr. Gabe Zimmer M.D on Workstation: [id number]       XR Chest 1 View [[id number]] Collected: 06/11/24 1432     Updated: 06/11/24 1449    Narrative:      XR CHEST 1 VW-     HISTORY: Male who is 74 years-old, sepsis     TECHNIQUE: Frontal views of the chest     COMPARISON: 6/7/2024     FINDINGS: The heart size is borderline. Sternotomy wires are present.  Pulmonary vasculature is unremarkable. Small, mildly increased left  basilar atelectasis or infiltrate, continued follow-up suggested. No  large pleural effusion, or pneumothorax. No acute osseous process.       Impression:      As described.           This report was finalized on 6/11/2024 2:46 PM by Dr. Gabe Zimmer M.D on Workstation: [id number]       XR Chest 1 View [[id number]] Collected: 06/07/24 1137     Updated: 06/07/24 1142    Narrative:      ONE-VIEW PORTABLE CHEST     HISTORY: Hypoxia.     FINDINGS: There is  decreased lung expansion when compared to the study  of 5/30/2024 this accentuates an appearance of vascular congestion and  vascular crowding. The heart is mildly enlarged with sternal wires from  previous cardiac surgery. I cannot completely exclude a component of  mild CHF or possibly a component of developing pneumonia at the left  base medially.     This report was finalized on 6/7/2024 11:39 AM by Dr. Jesus Alberto Walters M.D  on Workstation: [id number]       CT Head Without Contrast [[id number]] Collected: 05/30/24 0702     Updated: 06/03/24 0955    Narrative:      CT OF THE BRAIN WITHOUT CONTRAST 05/30/2024     HISTORY: Altered mental status.     Axial images were obtained through the brain without intravenous  contrast.     There is mild-to-moderate diffuse atrophy. There is decreased  attenuation of the periventricular white matter bilaterally consistent  with small vessel white matter ischemic disease. There is no evidence of  acute infarction, hemorrhage, midline shift or mass effect.     No bony abnormalities are seen.       Impression:       No acute process identified.           Radiation dose reduction techniques were utilized, including automated  exposure control and exposure modulation based on body size.        This report was finalized on 6/3/2024 9:52 AM by Dr. Tad Godfrey M.D on Workstation: [id number]        Renal Bilateral [[id number]] Collected: 05/31/24 1824     Updated: 05/31/24 1828    Narrative:      RENAL SONOGRAM     HISTORY: AMENA     COMPARISON: CT abdomen and pelvis 05/30/2024     FINDINGS: Right kidney measures 10.3 x 6.2 x 5 cm and the left kidney  measures 11.1 x 6.4 x 4.8 cm. No focal renal sonographic abnormality is  demonstrated. There is no hydronephrosis. Urinary bladder wall appears  thickened though the urine bladder exhibits low volume.       Impression:      Thickened urinary bladder wall. Low volume bladder. No  hydronephrosis.     This report was finalized on  5/31/2024 6:25 PM by Dr. Zechariah Falcon M.D on Workstation: [id number]       CT Abdomen Pelvis With Contrast [[id number]] Collected: 05/30/24 0711     Updated: 05/30/24 0738    Narrative:      CT CHEST W CONTRAST DIAGNOSTIC-, CT ABDOMEN PELVIS W CONTRAST-     DATE OF EXAM: 5/30/2024 5:45 AM     INDICATION: Significant leukocytosis of uncertain etiology. Acute  respiratory failure with hypoxia. Sepsis. Altered mental status. History  of perianal abscess.     COMPARISON: Chest radiograph 5/30/2024 and 10/4/2023. MRI lumbar spine  10/5/2023. CT chest 4/19/2022. Pelvis radiograph 1/11/2022.     TECHNIQUE: Multiple contiguous axial images were acquired through the  chest, abdomen, and pelvis following the intravenous administration of  100 mL of Isovue-300. Reformatted coronal and sagittal sequences were  also reviewed. Radiation dose reduction techniques were utilized,  including automated exposure control and exposure modulation based on  body size.     FINDINGS:  CT CHEST:  Low lung volumes with bilateral perihilar and bibasilar opacities most  consistent with subsegmental atelectasis and/or scarring. Mild bilateral  dependent atelectasis. Stable chronic 9 mm subpleural nodule in the  anterior base of the lingula with some peripheral calcification (axial  series 3 image 59), likely benign given the long-term stability.  Calcified and prior granulomatous disease in both lungs. No focal lung  consolidation. Mild chronic emphysematous changes in both lungs. The  central airways are widely patent. No pneumothorax or pleural effusion.     The heart is normal in size. Severe calcified coronary artery disease  with postoperative changes from CABG. No pericardial effusion. Calcified  atherosclerotic disease in the thoracic aorta without aneurysm. Mild  enlargement of the main pulmonary artery, suggesting pulmonary arterial  hypertension. Calcified remnants of prior granulomatous disease in the  mediastinum and hilum. Mildly  enlarged mediastinal lymph nodes with an  index left lower paratracheal lymph node measuring 2 cm x 1 cm (axial  series 2 image 36), possibly reactive and not significantly changed.  Small hiatal hernia.     Flowing multilevel mid and lower thoracic anterior osteophyte formation  and mild upper thoracic scoliosis. Sternotomy wires. Partially imaged  chronic changes in both shoulders. No acute osseous abnormality or  concerning osseous lesion.     CT ABDOMEN AND PELVIS:  Mildly heterogeneous hepatic attenuation with subtle nodular liver  contours, which could reflect underlying hepatocellular  disease/cirrhosis. The gallbladder and spleen are unremarkable.  Suspected chronic splenic vein thrombosis with peripheral calcification  and numerous collaterals in the left upper quadrant. Additional  pancreatic/peripancreatic calcifications could reflect sequela of  chronic pancreatitis. The adrenal glands and kidneys are unremarkable.  The urinary bladder is nondistended. Diffuse urinary bladder wall  thickening and mild perivesical fat stranding, which could accentuated  under distention but could also reflect cystitis. Dystrophic appearing  calcifications in the prostate gland.     Tiny hiatal hernia with mild nonspecific thickening of the distal  esophageal wall. Mild gastric wall thickening could be accentuated by  under distention. Moderate colonic stool. No bowel obstruction or  significant bowel wall thickening. The appendix is normal.     No free fluid in the abdomen or pelvis. No free intraperitoneal air. No  pathologically enlarged lymph nodes in the abdomen or pelvis. Calcified  atherosclerotic disease in the abdominal aorta and its distal branches  without aneurysm.     Nonspecific left inguinal fat stranding. Small fat-containing umbilical  and right inguinal hernias. Similar-appearing multilevel lumbar  spondylosis with chronic/degenerative grade 1 retrolisthesis of L2 on L3  and L5 on S1. Partially imaged  left JUDI. Partially imaged cerclage wire  fixation of the partially healed periprosthetic fracture of the proximal  left femur. Moderate right hip joint DJD. No acute osseous abnormality  or concerning osseous lesion.       Impression:         1. Diffuse urinary bladder wall thickening and mild perivesical fat  stranding, which could be accentuated by under distention but could  reflect a nonspecific cystitis but recommend correlating with  urinalysis.  2. Low lung volumes with multifocal bilateral perihilar and bibasilar  opacities that likely represent subsegmental atelectasis and/or  scarring.  3. Likely benign 9 mm subpleural nodule in the base of the lingula and  likely reactive mediastinal and bilateral hilar lymphadenopathy given  the long-term stability.  4. Nonspecific fat stranding in the left inguinal region. Correlate for  recent trauma or instrumentation.  5. Chronic appearing splenic vein thrombosis with peripheral  calcification and multiple venous collaterals in the left upper  quadrant. Additional pancreatic/peripancreatic calcifications could  reflect sequela of chronic pancreatitis.  6. Mildly heterogeneous hepatic attenuation with subtle nodular liver  contours, which could reflect underlying hepatocellular  disease/cirrhosis.  7. Tiny hiatal hernia with mild nonspecific thickening of the distal  esophageal wall and mild gastric wall thickening that could be  accentuated by under distention. Correlate for GERD and possible  gastritis.     This report was finalized on 5/30/2024 7:35 AM by Julio Thomas MD on  Workstation: [id number]       CT Chest With Contrast Diagnostic [[id number]] Collected: 05/30/24 0711     Updated: 05/30/24 0738    Narrative:      CT CHEST W CONTRAST DIAGNOSTIC-, CT ABDOMEN PELVIS W CONTRAST-     DATE OF EXAM: 5/30/2024 5:45 AM     INDICATION: Significant leukocytosis of uncertain etiology. Acute  respiratory failure with hypoxia. Sepsis. Altered mental status. History  of  perianal abscess.     COMPARISON: Chest radiograph 5/30/2024 and 10/4/2023. MRI lumbar spine  10/5/2023. CT chest 4/19/2022. Pelvis radiograph 1/11/2022.     TECHNIQUE: Multiple contiguous axial images were acquired through the  chest, abdomen, and pelvis following the intravenous administration of  100 mL of Isovue-300. Reformatted coronal and sagittal sequences were  also reviewed. Radiation dose reduction techniques were utilized,  including automated exposure control and exposure modulation based on  body size.     FINDINGS:  CT CHEST:  Low lung volumes with bilateral perihilar and bibasilar opacities most  consistent with subsegmental atelectasis and/or scarring. Mild bilateral  dependent atelectasis. Stable chronic 9 mm subpleural nodule in the  anterior base of the lingula with some peripheral calcification (axial  series 3 image 59), likely benign given the long-term stability.  Calcified and prior granulomatous disease in both lungs. No focal lung  consolidation. Mild chronic emphysematous changes in both lungs. The  central airways are widely patent. No pneumothorax or pleural effusion.     The heart is normal in size. Severe calcified coronary artery disease  with postoperative changes from CABG. No pericardial effusion. Calcified  atherosclerotic disease in the thoracic aorta without aneurysm. Mild  enlargement of the main pulmonary artery, suggesting pulmonary arterial  hypertension. Calcified remnants of prior granulomatous disease in the  mediastinum and hilum. Mildly enlarged mediastinal lymph nodes with an  index left lower paratracheal lymph node measuring 2 cm x 1 cm (axial  series 2 image 36), possibly reactive and not significantly changed.  Small hiatal hernia.     Flowing multilevel mid and lower thoracic anterior osteophyte formation  and mild upper thoracic scoliosis. Sternotomy wires. Partially imaged  chronic changes in both shoulders. No acute osseous abnormality or  concerning osseous  lesion.     CT ABDOMEN AND PELVIS:  Mildly heterogeneous hepatic attenuation with subtle nodular liver  contours, which could reflect underlying hepatocellular  disease/cirrhosis. The gallbladder and spleen are unremarkable.  Suspected chronic splenic vein thrombosis with peripheral calcification  and numerous collaterals in the left upper quadrant. Additional  pancreatic/peripancreatic calcifications could reflect sequela of  chronic pancreatitis. The adrenal glands and kidneys are unremarkable.  The urinary bladder is nondistended. Diffuse urinary bladder wall  thickening and mild perivesical fat stranding, which could accentuated  under distention but could also reflect cystitis. Dystrophic appearing  calcifications in the prostate gland.     Tiny hiatal hernia with mild nonspecific thickening of the distal  esophageal wall. Mild gastric wall thickening could be accentuated by  under distention. Moderate colonic stool. No bowel obstruction or  significant bowel wall thickening. The appendix is normal.     No free fluid in the abdomen or pelvis. No free intraperitoneal air. No  pathologically enlarged lymph nodes in the abdomen or pelvis. Calcified  atherosclerotic disease in the abdominal aorta and its distal branches  without aneurysm.     Nonspecific left inguinal fat stranding. Small fat-containing umbilical  and right inguinal hernias. Similar-appearing multilevel lumbar  spondylosis with chronic/degenerative grade 1 retrolisthesis of L2 on L3  and L5 on S1. Partially imaged left JUDI. Partially imaged cerclage wire  fixation of the partially healed periprosthetic fracture of the proximal  left femur. Moderate right hip joint DJD. No acute osseous abnormality  or concerning osseous lesion.       Impression:         1. Diffuse urinary bladder wall thickening and mild perivesical fat  stranding, which could be accentuated by under distention but could  reflect a nonspecific cystitis but recommend correlating  with  urinalysis.  2. Low lung volumes with multifocal bilateral perihilar and bibasilar  opacities that likely represent subsegmental atelectasis and/or  scarring.  3. Likely benign 9 mm subpleural nodule in the base of the lingula and  likely reactive mediastinal and bilateral hilar lymphadenopathy given  the long-term stability.  4. Nonspecific fat stranding in the left inguinal region. Correlate for  recent trauma or instrumentation.  5. Chronic appearing splenic vein thrombosis with peripheral  calcification and multiple venous collaterals in the left upper  quadrant. Additional pancreatic/peripancreatic calcifications could  reflect sequela of chronic pancreatitis.  6. Mildly heterogeneous hepatic attenuation with subtle nodular liver  contours, which could reflect underlying hepatocellular  disease/cirrhosis.  7. Tiny hiatal hernia with mild nonspecific thickening of the distal  esophageal wall and mild gastric wall thickening that could be  accentuated by under distention. Correlate for GERD and possible  gastritis.     This report was finalized on 5/30/2024 7:35 AM by Julio Thomas MD on  Workstation: [id number]       XR Chest 1 View [[id number]] Collected: 05/30/24 0446     Updated: 05/30/24 0450    Narrative:      SINGLE VIEW OF THE CHEST     HISTORY: Altered mental status     COMPARISON: October 4, 2023     FINDINGS:  Cardiomegaly is present. 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 No pneumothorax or pleural effusion is  seen. Linear scarring versus atelectasis is noted within the left  midlung. Nonspecific bibasilar consolidation is noted.       Impression:      Nonspecific bibasilar consolidation.     This report was finalized on 5/30/2024 4:47 AM by Dr. Gayle Okeefe M.D on Workstation: [id number]             Results for orders placed during the hospital encounter of 05/30/24    Duplex Venous Upper Extremity - Left CAR    Interpretation Summary    Acute  left upper extremity superficial thrombophlebitis noted in the cephalic (upper arm) and cephalic (forearm).    All other left sided vessels appear normal.    Results for orders placed during the hospital encounter of 05/30/24    Adult Transthoracic Echo Complete W/ Cont if Necessary Per Protocol    Interpretation Summary    Left ventricular systolic function is hyperdynamic (EF > 70%). Calculated left ventricular EF = 75% Global longitudinal LV strain (GLS) = -17.8%. Left ventricle strain data was reviewed by the physician and found to be accurate. Global longitudinal LV strain is normal however there is regional abnormality with apical sparing suggestive of amyloid heart disease. However there is also basal ptal hypertrophy suggestive of hypertrophic cardiomyopathy. Wall motion abnormality is also noted in the basal septum and cannot rule out ischemic component.Consider additional imaging such as cardiac MRI and further evaluation also for amyloid heart disease as clinically indicated. The left ventricular cavity is small in size. Left ventricular wall thickness is consistent with moderate to severe septal asymmetric hypertrophy. There is hypokinesis of the left ventricular basal septum. Left ventricular diastolic function is consistent with (grade II w/high LAP) pseudonormalization.    The left atrial cavity is mildly dilated.    No aortic valve regurgitation or stenosis is present. The aortic valve is abnormal in structure. There is mild thickening of the aortic valve.    There is mild, bileaflet mitral valve thickening present. Trace mitral valve regurgitation is present. No significant mitral valve stenosis is present.    Moderate tricuspid valve regurgitation is present. Estimated right ventricular systolic pressure from tricuspid regurgitation is markedly elevated (>55 mmHg). Calculated right ventricular systolic pressure from tricuspid regurgitation is 74 mmHg.    Pertinent Labs     Results from last 7 days  "  Lab Units 06/25/24  0737 06/22/24  1114   WBC 10*3/mm3 6.69 8.29   HEMOGLOBIN g/dL 10.0* 10.2*   PLATELETS 10*3/mm3 207 229     Results from last 7 days   Lab Units 06/25/24  0737 06/24/24  0640 06/23/24  0639 06/22/24  0627   SODIUM mmol/L 141 138 140 139   POTASSIUM mmol/L 4.1 4.5 4.7 4.6   CHLORIDE mmol/L 104 101 103 106   CO2 mmol/L 31.0* 29.1* 29.0 25.5   BUN mg/dL 42* 42* 41* 37*   CREATININE mg/dL 2.63* 3.11* 2.95* 3.31*   GLUCOSE mg/dL 181* 177* 133* 214*   EGFR mL/min/1.73 24.6* 20.1* 21.4* 18.7*       Results from last 7 days   Lab Units 06/25/24  0737 06/24/24  0640 06/23/24  0639 06/22/24  0627   CALCIUM mg/dL 8.2* 8.7 8.4* 8.5*               Invalid input(s): \"LDLCALC\"  Results from last 7 days   Lab Units 06/21/24  1459   URINECX  Yeast isolated*         Test Results Pending at Discharge   No pending test results at time of discharge    Discharge Details        Discharge Medications        New Medications        Instructions Start Date   amiodarone 200 MG tablet  Commonly known as: PACERONE   200 mg, Oral, Every 24 Hours Scheduled   Start Date: June 26, 2024     apixaban 5 MG tablet tablet  Commonly known as: ELIQUIS   5 mg, Oral, Every 12 Hours Scheduled      metoprolol tartrate 25 MG tablet  Commonly known as: LOPRESSOR   12.5 mg, Oral, Every 12 Hours Scheduled      terazosin 1 MG capsule  Commonly known as: HYTRIN   1 mg, Oral, Nightly      thiamine 100 MG tablet  Commonly known as: VITAMIN B1   100 mg, Oral, Daily   Start Date: June 26, 2024            Changes to Medications        Instructions Start Date   bumetanide 1 MG tablet  Commonly known as: BUMEX  What changed: Another medication with the same name was added. Make sure you understand how and when to take each.   1 mg, Oral, Daily      bumetanide 1 MG tablet  Commonly known as: BUMEX  What changed: You were already taking a medication with the same name, and this prescription was added. Make sure you understand how and when to take " each.   1 mg, Oral, 2 Times Daily             Continue These Medications        Instructions Start Date   aspirin 81 MG EC tablet   81 mg, Oral, Nightly      brimonidine 0.2 % ophthalmic solution  Commonly known as: ALPHAGAN   1 drop, Both Eyes, 2 Times Daily      DULoxetine 30 MG capsule  Commonly known as: CYMBALTA   1 capsule, Oral, Nightly      Insulin Glargine (2 Unit Dial) 300 UNIT/ML solution pen-injector injection  Commonly known as: TOUJEO   24 Units, Subcutaneous, Daily      insulin lispro 100 UNIT/ML injection  Commonly known as: humaLOG   0-14 Units, Subcutaneous, 3 Times Daily Before Meals      latanoprost 0.005 % ophthalmic solution  Commonly known as: XALATAN   1 drop, Both Eyes, Every Night at Bedtime      Magnesium 400 MG capsule   400 mg, Oral, As Needed      multivitamin with minerals tablet tablet   1 tablet, Oral, Daily      sildenafil 20 MG tablet  Commonly known as: REVATIO   20 mg, Oral, As Needed      testosterone 25 MG/2.5GM (1%) gel gel  Commonly known as: ANDROGEL   25 mg, Transdermal, 2 Times Weekly      traMADol 50 MG tablet  Commonly known as: ULTRAM   50 mg, Oral, Nightly PRN      Vitamin D-3 125 MCG (5000 UT) tablet   1 tablet, Oral, Every 24 Hours               Allergies   Allergen Reactions    Ace Inhibitors Angioedema    Angiotensin Receptor Blockers Angioedema and Unknown (See Comments)     Angioneurotic edema    Dapagliflozin Other (See Comments)     UTI,  rectal abcess    Losartan Angioedema     Angioneurotic edema    Seroquel [Quetiapine] Hallucinations    Xanax [Alprazolam] Unknown - High Severity    Amlodipine Swelling    Ativan [Lorazepam] Hallucinations    Baclofen Hallucinations    Misc. Sulfonamide Containing Compounds Unknown (See Comments)    Minoxidil Confusion    Tetracycline Rash     bliisters in mouth         Discharge Disposition:  Home or Self Care      Discharge Diet:  Diet Order   Procedures    Diet: Regular/House, Diabetic, Cardiac; Healthy Heart (2-3 Na+);  Consistent Carbohydrate; Texture: Regular (IDDSI 7); Fluid Consistency: Thin (IDDSI 0)       Discharge Activity: Activity as tolerated      CODE STATUS:    Code Status and Medical Interventions:   Ordered at: 05/30/24 1008     Code Status (Patient has no pulse and is not breathing):    CPR (Attempt to Resuscitate)     Medical Interventions (Patient has pulse or is breathing):    Full Support       Future Appointments   Date Time Provider Department Center   6/27/2024 10:30 AM Danii Dennison APRN [facility]   7/23/2024  9:00 AM Clive Jaime MD [facility]   7/23/2024  1:30 PM Danii Dennison APRN [facility]     Additional Instructions for the Follow-ups that You Need to Schedule       Ambulatory Referral to Home Health (Hospital)   As directed      Face to Face Visit Date: 6/5/2024   Follow-up provider for Plan of Care?: I treated the patient in an acute care facility and will not continue treatment after discharge.   Follow-up provider: DALI CAMARGO [[id number]]   Reason/Clinical Findings: mobility below baseline, acute infection   Describe mobility limitations that make leaving home difficult: doesn't drive, mobility below baseline.   Nursing/Therapeutic Services Requested: Physical Therapy Occupational Therapy Skilled Nursing   Skilled nursing orders: O2 instruction Cardiopulmonary assessments   PT orders: Home safety assessment Strengthening Therapeutic exercise   Occupational orders: Strengthening [employer] conservation Activities of daily living   Frequency: 1 Week 1               Contact information for follow-up providers       [facility] .    Specialty: Home Health Services  Contact information:  [address] [address] [phone]             Dali Camargo, DO .    Specialty: Family Medicine  Contact information:  [address] [facility] [address] [phone]                       Contact information for  after-discharge care       Durable Medical Equipment       [facility] .    Service: Durable Medical Equipment  Contact information:  [address]  [facility] [id number] [phone]                     Home Medical Care       [facility] .    Service: Home Health Services  Contact information:  [address]  [facility] [address] [phone]                                   Additional Instructions for the Follow-ups that You Need to Schedule       Ambulatory Referral to Home Health (Hospital)   As directed      Face to Face Visit Date: 6/5/2024   Follow-up provider for Plan of Care?: I treated the patient in an acute care facility and will not continue treatment after discharge.   Follow-up provider: DALI ROWLEY [[id number]]   Reason/Clinical Findings: mobility below baseline, acute infection   Describe mobility limitations that make leaving home difficult: doesn't drive, mobility below baseline.   Nursing/Therapeutic Services Requested: Physical Therapy Occupational Therapy Skilled Nursing   Skilled nursing orders: O2 instruction Cardiopulmonary assessments   PT orders: Home safety assessment Strengthening Therapeutic exercise   Occupational orders: Strengthening [employer] conservation Activities of daily living   Frequency: 1 Week 1            Time Spent on Discharge:  Greater than 30 minutes      Nabila Flynn MD  [facility]  06/25/24  12:56 EDT

## 2024-06-25 NOTE — PLAN OF CARE
Goal Outcome Evaluation:           Problem: Adult Inpatient Plan of Care  Goal: Plan of Care Review  Outcome: Ongoing, Progressing  Flowsheets (Taken 6/25/2024 0610)  Plan of Care Reviewed With: patient  Outcome Evaluation:   Pt A&Ox4 with response lag at times, bizarre comments at times   NSR-SB on heart monitor, B/P soft but pt asymptomatic   3L n/c (2L is pt's baseline) but had to increase to 4.5L during HS due to desat.  Small BM this morning, purewick with male wrap and brief - good UOP, Q2 hr turns, pain treated with one time dose Tramadol and calmoceptine applied to coccyx/buttocks area for excoriation.  Wound care Q other day to leg, due today 06/25.  Heels elevated on pillows.  Pt's spouse at bedside and refusing some medications for him such as Humalog and Miralax.  Wife cross-checking pt's BG with her home insulin meter for comparison.  Will await MD to visit and discuss POC with pt.  Goal: Absence of Hospital-Acquired Illness or Injury  Outcome: Ongoing, Progressing  Intervention: Identify and Manage Fall Risk  Description: Perform standard risk assessment on admission using a validated tool or comprehensive approach appropriate to the patient; reassess fall risk frequently, with change in status or transfer to another level of care.  Communicate fall injury risk to interprofessional healthcare team.  Determine need for increased observation, equipment and environmental modification, such as low bed, signage and supportive, nonskid footwear.  Adjust safety measures to individual developmental age, stage and identified risk factors.  Reinforce the importance of safety and physical activity with patient and family.  Perform regular intentional rounding to assess need for position change, pain assessment and personal needs, including assistance with toileting.  Recent Flowsheet Documentation  Taken 6/25/2024 9645 by Ingrid Song, RN  Safety Promotion/Fall Prevention: safety round/check completed  Taken  6/25/2024 0355 by Ingrid Song RN  Safety Promotion/Fall Prevention: safety round/check completed  Taken 6/25/2024 0156 by Ingrid Song RN  Safety Promotion/Fall Prevention: safety round/check completed  Taken 6/25/2024 0131 by Ingrid Song RN  Safety Promotion/Fall Prevention: safety round/check completed  Taken 6/24/2024 2346 by Ingrid Song RN  Safety Promotion/Fall Prevention: safety round/check completed  Taken 6/24/2024 2231 by Ingrid Song RN  Safety Promotion/Fall Prevention: safety round/check completed  Taken 6/24/2024 2117 by Ingrid Song RN  Safety Promotion/Fall Prevention: safety round/check completed  Intervention: Prevent Skin Injury  Description: Perform a screening for skin injury risk, such as pressure or moisture associated skin damage on admission and at regular intervals throughout hospital stay.  Keep all areas of skin (especially folds) clean and dry.  Maintain adequate skin hydration.  Relieve and redistribute pressure and protect bony prominences; implement measures based on patient-specific risk factors.  Match turning and repositioning schedule to clinical condition.  Encourage weight shift frequently; assist with reposition if unable to complete independently.  Float heels off bed; avoid pressure on the Achilles tendon.  Keep skin free from extended contact with medical devices.  Encourage functional activity and mobility, as early as tolerated.  Use aids (e.g., slide boards, mechanical lift) during transfer.  Recent Flowsheet Documentation  Taken 6/25/2024 0550 by Ingrid Song RN  Body Position:   right   tilted  Taken 6/25/2024 0355 by Ingrid Song RN  Body Position:   right   tilted  Taken 6/25/2024 0156 by Ingrid Song RN  Body Position: sitting up in bed  Taken 6/25/2024 0131 by Ingrid Song RN  Body Position:   tilted   sitting up in bed  Taken 6/24/2024 2346 by Ingrid Song RN  Body Position: right  Skin Protection:   adhesive  use limited   incontinence pads utilized   transparent dressing maintained   tubing/devices free from skin contact  Taken 6/24/2024 2249 by Ingrid Song RN  Body Position:   turned   right  Taken 6/24/2024 2231 by Ingrid Song RN  Body Position: left  Taken 6/24/2024 2117 by Ingrid Song RN  Body Position:   turned   left  Skin Protection:   adhesive use limited   incontinence pads utilized   tubing/devices free from skin contact   transparent dressing maintained  Intervention: Prevent and Manage VTE (Venous Thromboembolism) Risk  Description: Assess for VTE (venous thromboembolism) risk.  Encourage and assist with early ambulation.  Initiate and maintain compression or other therapy, as indicated, based on identified risk in accordance with organizational protocol and provider order.  Encourage both active and passive leg exercises while in bed, if unable to ambulate.  Recent Flowsheet Documentation  Taken 6/24/2024 2346 by Ingrid Song RN  Activity Management:   activity encouraged   bedrest  VTE Prevention/Management: (Eliquis) other (see comments)  Taken 6/24/2024 2117 by Ingrid Song RN  Activity Management:   activity encouraged   bedrest  VTE Prevention/Management: (Eliquis) other (see comments)  Intervention: Prevent Infection  Description: Maintain skin and mucous membrane integrity; promote hand, oral and pulmonary hygiene.  Optimize fluid balance, nutrition, sleep and glycemic control to maximize infection resistance.  Identify potential sources of infection early to prevent or mitigate progression of infection (e.g., wound, lines, devices).  Evaluate ongoing need for invasive devices; remove promptly when no longer indicated.  Recent Flowsheet Documentation  Taken 6/25/2024 0550 by Ingrid Song RN  Infection Prevention:   personal protective equipment utilized   single patient room provided   visitors restricted/screened   rest/sleep promoted   hand hygiene promoted  Taken  6/25/2024 0355 by Ingrid Song, RN  Infection Prevention:   rest/sleep promoted   visitors restricted/screened   single patient room provided   personal protective equipment utilized   hand hygiene promoted  Taken 6/25/2024 0156 by Ingrid Song, RN  Infection Prevention:   single patient room provided   visitors restricted/screened   hand hygiene promoted   personal protective equipment utilized   rest/sleep promoted  Taken 6/24/2024 2346 by Ingrid Song, RN  Infection Prevention:   visitors restricted/screened   single patient room provided   rest/sleep promoted   personal protective equipment utilized   hand hygiene promoted  Taken 6/24/2024 2249 by Ingrid Song, RN  Infection Prevention:   rest/sleep promoted   single patient room provided   visitors restricted/screened   hand hygiene promoted   personal protective equipment utilized  Taken 6/24/2024 2231 by Ingrid Song RN  Infection Prevention:   personal protective equipment utilized   single patient room provided   visitors restricted/screened   rest/sleep promoted   hand hygiene promoted  Taken 6/24/2024 2117 by Ingrid Song, RN  Infection Prevention:   personal protective equipment utilized   rest/sleep promoted   single patient room provided   visitors restricted/screened   hand hygiene promoted  Goal: Optimal Comfort and Wellbeing  Outcome: Ongoing, Progressing  Intervention: Monitor Pain and Promote Comfort  Description: Assess pain level, treatment efficacy and patient response at regular intervals using a consistent pain scale.  Consider the presence and impact of preexisting chronic pain.  Encourage patient and caregiver involvement in pain assessment, interventions and safety measures.  Recent Flowsheet Documentation  Taken 6/24/2024 2346 by Ingrid Song, RN  Pain Management Interventions:   care clustered   diversional activity provided   pillow support provided   position adjusted   quiet environment facilitated   see  MAR  Taken 6/24/2024 2117 by Ingrid Song, RN  Pain Management Interventions:   care clustered   diversional activity provided   pillow support provided   position adjusted   quiet environment facilitated   see MAR  Intervention: Provide Person-Centered Care  Description: Use a family-focused approach to care.  Develop trust and rapport by proactively providing information, encouraging questions, addressing concerns and offering reassurance.  Acknowledge emotional response to hospitalization.  Recognize and utilize personal coping strategies.  Honor spiritual and cultural preferences.  Recent Flowsheet Documentation  Taken 6/24/2024 2346 by Ingrid Song RN  Trust Relationship/Rapport:   care explained   choices provided   questions answered   questions encouraged   thoughts/feelings acknowledged   reassurance provided   emotional support provided  Taken 6/24/2024 2117 by Ingrid Song RN  Trust Relationship/Rapport:   care explained   choices provided   questions encouraged   questions answered   empathic listening provided   thoughts/feelings acknowledged   reassurance provided  Goal: Readiness for Transition of Care  Outcome: Ongoing, Progressing     Problem: Heart Failure Comorbidity  Goal: Maintenance of Heart Failure Symptom Control  Outcome: Ongoing, Progressing  Intervention: Maintain Heart Failure-Management  Description: Evaluate adherence to home heart failure self-care regimen (e.g., medication, fluid balance, sodium intake, daily weight, physical activity, telemonitoring, support).  Advocate continuation of home medication and schedule.  Consider pharmacologic therapy administration time and effects (e.g., avoid giving diuretic prior to bedtime or nitrates on empty stomach).  Monitor response to pharmacologic therapy, including weight fluctuations, blood pressure and electrolyte levels.  Monitor for signs and symptoms of anxiety and depression, including severity and duration; if present,  provide psychosocial support.  Consider need for heart failure clinic or palliative care consult.  Recent Flowsheet Documentation  Taken 6/25/2024 0550 by Ingrid Song RN  Medication Review/Management:   medications reviewed   high-risk medications identified  Taken 6/25/2024 0355 by Ingrid Song RN  Medication Review/Management:   medications reviewed   high-risk medications identified  Taken 6/25/2024 0156 by Ingrid Song RN  Medication Review/Management:   medications reviewed   high-risk medications identified  Taken 6/24/2024 2346 by Ingrid Song RN  Medication Review/Management:   medications reviewed   high-risk medications identified  Taken 6/24/2024 2231 by Ingrid Song RN  Medication Review/Management:   medications reviewed   high-risk medications identified  Taken 6/24/2024 2117 by Ingrid Song RN  Medication Review/Management:   medications reviewed   high-risk medications identified     Problem: Hypertension Comorbidity  Goal: Blood Pressure in Desired Range  Outcome: Ongoing, Progressing  Intervention: Maintain Blood Pressure Management  Description: Evaluate adherence to home antihypertensive regimen (e.g., exercise and activity, diet modification, medication).  Provide scheduled antihypertensive medication; consider administration time and effects (e.g., avoid giving diuretic prior to bedtime).  Monitor response to antihypertensive medication therapy (e.g., blood pressure, electrolyte levels, medication effects).  Minimize risk of orthostatic hypotension; encourage caution with position changes, particularly if elderly.  Recent Flowsheet Documentation  Taken 6/25/2024 0550 by Ingrid Song RN  Medication Review/Management:   medications reviewed   high-risk medications identified  Taken 6/25/2024 0355 by Ingrid Song RN  Medication Review/Management:   medications reviewed   high-risk medications identified  Taken 6/25/2024 0156 by Ingrid Song  RN  Medication Review/Management:   medications reviewed   high-risk medications identified  Taken 6/24/2024 2346 by Ingrid Song, RN  Syncope Management: position changed slowly  Medication Review/Management:   medications reviewed   high-risk medications identified  Taken 6/24/2024 2231 by Ingrid Song RN  Medication Review/Management:   medications reviewed   high-risk medications identified  Taken 6/24/2024 2117 by Ingrid Song, RN  Syncope Management:   legs elevated   position changed slowly  Medication Review/Management:   medications reviewed   high-risk medications identified     Problem: Skin Injury Risk Increased  Goal: Skin Health and Integrity  Outcome: Ongoing, Progressing  Intervention: Promote and Optimize Oral Intake  Description: Perform a nutrition assessment; include a nutrition-focused physical exam.  Determine calorie, protein, vitamin, mineral and fluid requirements.  Assess for micronutrient deficiencies; supplement if depleted.  Assess need and assist with meal set-up and feeding.  Adjust diet or meal schedule based on preferences and tolerance.  Offer oral supplemental food or drinks to enhance calorie and protein intake.  Establish bowel elimination program to increase comfort and appetite.  Minimize unnecessary dietary restrictions to increase oral intake.  Provide and encourage oral hygiene to enhance desire to eat.  Consider enteral nutrition support if oral intake remains inadequate; provide parenteral nutrition if enteral is contraindicated.  Recent Flowsheet Documentation  Taken 6/24/2024 2346 by Ingrid Song, RN  Oral Nutrition Promotion:   rest periods promoted   safe use of adaptive equipment encouraged  Taken 6/24/2024 2117 by Ingrid Song RN  Oral Nutrition Promotion:   safe use of adaptive equipment encouraged   rest periods promoted  Intervention: Optimize Skin Protection  Description: Perform a full pressure injury risk assessment, as indicated by  screening, upon admission to care unit.  Reassess skin (injury risk, full inspection) frequently (e.g., scheduled interval, with change in condition) to provide optimal early detection and prevention.  Maintain adequate tissue perfusion (e.g., encourage fluid balance; avoid crossing legs, constrictive clothing or devices) to promote tissue oxygenation.  Maintain head of bed at lowest degree of elevation tolerated, considering medical condition and other restrictions.  Avoid positioning onto an area that remains reddened.  Minimize incontinence and moisture (e.g., toileting schedule; moisture-wicking pad, diaper or incontinence collection device; skin moisture barrier).  Cleanse skin promptly and gently when soiled utilizing a pH-balanced cleanser.  Relieve and redistribute pressure (e.g., scheduled position changes, weight shifts, use of support surface, medical device repositioning, protective dressing application, use of positioning device, microclimate control, use of pressure-injury-monitor  Encourage increased activity, such as sitting in a chair at the bedside or early mobilization, when able to tolerate.  Recent Flowsheet Documentation  Taken 6/25/2024 0550 by Ingrid Song RN  Head of Bed (HOB) Positioning: HOB at 20-30 degrees  Taken 6/25/2024 0355 by Ingrid Song RN  Head of Bed (HOB) Positioning: HOB elevated  Taken 6/25/2024 0156 by Ingrid Song RN  Head of Bed (HOB) Positioning: HOB elevated  Taken 6/25/2024 0131 by Ingrid Song RN  Head of Bed (HOB) Positioning: HOB lowered  Taken 6/24/2024 2346 by Ingrid Song RN  Pressure Reduction Techniques:   frequent weight shift encouraged   heels elevated off bed   weight shift assistance provided  Head of Bed (HOB) Positioning: HOB elevated  Pressure Reduction Devices:   alternating pressure pump (ADD)   pressure-redistributing mattress utilized   positioning supports utilized  Skin Protection:   adhesive use limited   incontinence pads  utilized   transparent dressing maintained   tubing/devices free from skin contact  Taken 6/24/2024 2249 by Ingrid Song, RN  Head of Bed (HOB) Positioning: HOB at 20-30 degrees  Taken 6/24/2024 2231 by Ingrid Song RN  Head of Bed (HOB) Positioning: HOB at 20-30 degrees  Taken 6/24/2024 2117 by Ingrid Song, RN  Pressure Reduction Techniques:   frequent weight shift encouraged   weight shift assistance provided   heels elevated off bed  Head of Bed (HOB) Positioning: HOB at 30-45 degrees  Pressure Reduction Devices:   alternating pressure pump (ADD)   positioning supports utilized   pressure-redistributing mattress utilized  Skin Protection:   adhesive use limited   incontinence pads utilized   tubing/devices free from skin contact   transparent dressing maintained     Problem: Fall Injury Risk  Goal: Absence of Fall and Fall-Related Injury  Outcome: Ongoing, Progressing  Intervention: Identify and Manage Contributors  Description: Develop a fall prevention plan with the patient and caregiver/family.  Provide reorientation, appropriate sensory stimulation and routines with changes in mental status to decrease risk of fall.  Promote use of personal vision and auditory aids.  Assess assistance level required for safe and effective self-care; provide support as needed, such as toileting, mobilization. For age 65 and older, implement timed toileting with assistance.  Encourage physical activity, such as performance of mobility and self-care at highest level of patient ability, multicomponent exercise program and provision of appropriate assistive devices.  If fall occurs, assess the severity of injury; implement fall injury protocol. Determine the cause and revise fall injury prevention plan.  Regularly review medication contribution to fall risk; adjust medication administration times to minimize risk of falling.  Consider risk related to polypharmacy and age.  Balance adequate pain management with  potential for oversedation.  Recent Flowsheet Documentation  Taken 6/25/2024 0550 by Ingrid Song RN  Medication Review/Management:   medications reviewed   high-risk medications identified  Taken 6/25/2024 0355 by Ingrid Song RN  Medication Review/Management:   medications reviewed   high-risk medications identified  Taken 6/25/2024 0156 by Ingrid Song RN  Medication Review/Management:   medications reviewed   high-risk medications identified  Taken 6/24/2024 2346 by Ingrid Song RN  Medication Review/Management:   medications reviewed   high-risk medications identified  Self-Care Promotion:   BADL personal objects within reach   independence encouraged   safe use of adaptive equipment encouraged  Taken 6/24/2024 2231 by Ingrid Song RN  Medication Review/Management:   medications reviewed   high-risk medications identified  Taken 6/24/2024 2117 by Ingrid Song RN  Medication Review/Management:   medications reviewed   high-risk medications identified  Self-Care Promotion:   independence encouraged   BADL personal objects within reach   safe use of adaptive equipment encouraged  Intervention: Promote Injury-Free Environment  Description: Provide a safe, barrier-free environment that encourages independent activity.  Keep care area uncluttered and well-lighted.  Determine need for increased observation or monitoring.  Avoid use of devices that minimize mobility, such as restraints or indwelling urinary catheter.  Recent Flowsheet Documentation  Taken 6/25/2024 0550 by Ingrid Song RN  Safety Promotion/Fall Prevention: safety round/check completed  Taken 6/25/2024 0355 by Ingrid Song RN  Safety Promotion/Fall Prevention: safety round/check completed  Taken 6/25/2024 0156 by Ingrid Song, RN  Safety Promotion/Fall Prevention: safety round/check completed  Taken 6/25/2024 0131 by Ingrid Song, RN  Safety Promotion/Fall Prevention: safety round/check completed  Taken  6/24/2024 2346 by Ingrid oSng, RN  Safety Promotion/Fall Prevention: safety round/check completed  Taken 6/24/2024 2231 by Ingrid Song, RN  Safety Promotion/Fall Prevention: safety round/check completed  Taken 6/24/2024 2117 by Ingrid Song, RN  Safety Promotion/Fall Prevention: safety round/check completed

## 2024-06-25 NOTE — OUTREACH NOTE
Prep Survey      Flowsheet Row Responses   [facility] facility patient discharged from? [facility]   Is LACE score < 7 ? No   Eligibility Readm Mgmt   Discharge diagnosis Acute UTI   Does the patient have one of the following disease processes/diagnoses(primary or secondary)? Other   Does the patient have Home health ordered? Yes   What is the Home health agency?  [facility]    Is there a DME ordered? Yes   What DME was ordered? [facility] for DME   Prep survey completed? Yes            BAKARI JIAN - Registered Nurse

## 2024-06-25 NOTE — PROGRESS NOTES
PROGRESS NOTE      Patient Name: Dilip Verma  : 1950  MRN: [id number]  Primary Care Physician: Fernando Camargo DO  Date of admission: 2024    Patient Care Team:  Fernando Camargo DO as PCP - General (Family Medicine)  Morris Cai MD as Consulting Physician (Sleep Medicine)  Michaela Hylton MD as Consulting Physician (Cardiology)  Hardy Banerjee MD as Consulting Physician (Ophthalmology)  Chula Christianson MD as Consulting Physician (Ophthalmology)  Jason Sherman MD (Endocrinology)  Perla Mayen MD as Consulting Physician (Nephrology)  Federico Hebert MD as Consulting Physician (Pulmonary Disease)  Niraj Ravi MD as Consulting Physician (Orthopedic Surgery)  Papa Velasco MD as Consulting Physician (Urology)  Terese Yost MD as Consulting Physician (Gastroenterology)  Aracelis Ball MD as Consulting Physician (Colon and Rectal Surgery)  [facility] as Primary Care Provider        Reason for Follow up:     AMENA, CKD III      Subjective:     Patient seen and examined this morning, doing well, no new complaints, family at bedside, renal function continues to improve.    Review of Systems:         Constitutional: weakness.  HEENT:  No headache, otalgia, itchy eyes, nasal discharge or sore throat.  Cardiac:  No chest pain, dyspnea, orthopnea or PND.  Chest:  No cough, phlegm or wheezing.  Abdomen:  No abdominal pain, nausea or vomiting.  Neuro:  No focal weakness, abnormal movements or seizure-like activity.  :   No hematuria, no pyuria, no dysuria, no flank pain.  Extremities:  No  joint pains.  ROS was otherwise negative except as mentioned in the [place].    Social History:  reports that he quit smoking about 24 years ago. His smoking use included cigarettes. He started smoking about 40 years ago. He has a 12 pack-year smoking history. He has been exposed to tobacco smoke. He has never used smokeless tobacco. He reports that he does not  currently use alcohol. He reports that he does not use drugs.    Medications:  Prior to Admission medications    Medication Sig Start Date End Date Taking? Authorizing Provider   aspirin 81 MG EC tablet Take 1 tablet by mouth Every Night.   Yes Heri Rinaldi MD   brimonidine (ALPHAGAN) 0.2 % ophthalmic solution Administer 1 drop to both eyes 2 (Two) Times a Day.   Yes Heri Rinaldi MD   bumetanide (BUMEX) 1 MG tablet Take 1 tablet by mouth Daily.   Yes Heri Rinaldi MD   Cholecalciferol (Vitamin D-3) 125 MCG (5000 UT) tablet Take 1 tablet by mouth Daily.   Yes Heri Rinaldi MD   DULoxetine (CYMBALTA) 30 MG capsule Take 1 capsule by mouth Every Night. 1/16/24  Yes Heri Rinaldi MD   Insulin Glargine, 2 Unit Dial, (TOUJEO) 300 UNIT/ML solution pen-injector injection Inject 24 Units under the skin into the appropriate area as directed Daily.   Yes Heri Rinaldi MD   insulin lispro (humaLOG) 100 UNIT/ML injection Inject 0-14 Units under the skin into the appropriate area as directed 3 (Three) Times a Day Before Meals.  Patient taking differently: Inject 0-14 Units under the skin into the appropriate area as directed 3 (Three) Times a Day Before Meals. SLIDING SCALE  100-150 - 4 units  151-200 - 5 units  201-250 - 6 units  251-300 - 7 units  301-350 - 8 units  351-400 - 9 units  401+ - 10 units 12/7/20  Yes Amador Valverde MD   latanoprost (XALATAN) 0.005 % ophthalmic solution Administer 1 drop to both eyes every night at bedtime. 1/16/23  Yes Heri Rinaldi MD   multivitamin with minerals (MULTIVITAMIN ADULTS PO) Take 1 tablet by mouth Daily.   Yes Heri Rinaldi MD   testosterone (ANDROGEL) 25 MG/2.5GM (1%) gel gel Place 25 mg on the skin as directed by provider 2 (Two) Times a Week.   Yes Heri Rinaldi MD   traMADol (ULTRAM) 50 MG tablet Take 1 tablet by mouth At Night As Needed. 10/16/23  Yes Heri Rinaldi MD   Magnesium 400 MG capsule Take  400 mg by mouth As Needed.    ProviderHeri MD   sildenafil (REVATIO) 20 MG tablet Take 1 tablet by mouth As Needed.    ProviderHeri MD     Scheduled Meds:amiodarone, 200 mg, Oral, Q24H  apixaban, 5 mg, Oral, Q12H  aspirin, 81 mg, Oral, Daily  brimonidine, 1 drop, Both Eyes, BID  bumetanide, 1 mg, Oral, BID  DULoxetine, 30 mg, Oral, Daily  guaiFENesin, 600 mg, Oral, Q12H  insulin lispro, 2-9 Units, Subcutaneous, 4x Daily AC & at Bedtime  ipratropium-albuterol, 3 mL, Nebulization, 4x Daily - RT  latanoprost, 1 drop, Both Eyes, Nightly  Menthol-Zinc Oxide, 1 Application, Topical, Q12H  metoprolol tartrate, 12.5 mg, Oral, Q12H  OLANZapine, 5 mg, Oral, Nightly  polyethylene glycol, 17 g, Oral, BID  senna-docusate sodium, 2 tablet, Oral, BID  sodium chloride, 10 mL, Intravenous, Q12H  sodium chloride, 10 mL, Intravenous, Q12H  sodium chloride, 4 mL, Nebulization, BID - RT  terazosin, 1 mg, Oral, Nightly  thiamine, 100 mg, Oral, Daily      Continuous Infusions:         PRN Meds:  acetaminophen **OR** acetaminophen **OR** acetaminophen    senna-docusate sodium **AND** polyethylene glycol **AND** bisacodyl **AND** bisacodyl    dextrose    dextrose    glucagon (human recombinant)    midodrine    nitroglycerin    OLANZapine    ondansetron    sodium chloride    sodium chloride    sodium chloride    sodium chloride  Allergies:    Allergies   Allergen Reactions    Ace Inhibitors Angioedema    Angiotensin Receptor Blockers Angioedema and Unknown (See Comments)     Angioneurotic edema    Dapagliflozin Other (See Comments)     UTI,  rectal abcess    Losartan Angioedema     Angioneurotic edema    Seroquel [Quetiapine] Hallucinations    Xanax [Alprazolam] Unknown - High Severity    Amlodipine Swelling    Ativan [Lorazepam] Hallucinations    Baclofen Hallucinations    Misc. Sulfonamide Containing Compounds Unknown (See Comments)    Minoxidil Confusion    Tetracycline Rash     bliisters in mouth         Objective  "  Exam:     Vital Signs  Temp:  [97.9 °F (36.6 °C)-98.2 °F (36.8 °C)] 98.2 °F (36.8 °C)  Heart Rate:  [56-67] 61  Resp:  [15-18] 16  BP: ()/(50-72) 113/58  SpO2:  [94 %-100 %] 95 %  on  Flow (L/min):  [3-4.5] 4.5;   Device (Oxygen Therapy): nasal cannula;humidified  Body mass index is 28.49 kg/m².       Exam:     /58   Pulse 61   Temp 98.2 °F (36.8 °C) (Oral)   Resp 16   Ht 172.7 cm (68\")   Wt 85 kg (187 lb 6.3 oz)   SpO2 95%   BMI 28.49 kg/m²     General Appearance:    Appears chronically ill, no distress   Head:    Normocephalic, atraumatic   Eyes:     EOM's intact, sclerae anicteric        Ears:    TMs not observed   Nose:   Patent without discharge   Neck:   Supple, JVD   Lungs:     Clear to auscultation bilaterally, respiratory effort is normal   Chest wall:    No tenderness   Heart:    Regular rate and rhythm, S1 and S2 normal, no   rub    or gallop   Abdomen:     Soft, nontender, nondistended,  no masses, no organomegaly   Extremities:   No edema   Neurologic:  No focal deficits.  Speech is fluent.  Conversation is coherent.      Results Review:  I have personally reviewed most recent Data :  [facility]10[facility]  CBC    Results from last 7 days   Lab Units 06/25/24  0737 06/22/24  1114   WBC 10*3/mm3 6.69 8.29   HEMOGLOBIN g/dL 10.0* 10.2*   PLATELETS 10*3/mm3 207 229     CMP   Results from last 7 days   Lab Units 06/25/24  0737 06/24/24  0640 06/23/24  0639 06/22/24  0627 06/21/24  0732 06/20/24  0822 06/19/24  0753   SODIUM mmol/L 141 138 140 139 142 139 139   POTASSIUM mmol/L 4.1 4.5 4.7 4.6 4.7 4.4 4.2   CHLORIDE mmol/L 104 101 103 106 106 104 105   CO2 mmol/L 31.0* 29.1* 29.0 25.5 28.0 27.0 24.0   BUN mg/dL 42* 42* 41* 37* 40* 38* 35*   CREATININE mg/dL 2.63* 3.11* 2.95* 3.31* 3.45* 3.36* 3.64*   GLUCOSE mg/dL 181* 177* 133* 214* 170* 195* 135*     ABG            XR Chest PA & Lateral    Result Date: 6/21/2024   1. Stable right IJ central venous catheter. 2. Low lung volumes " with similar-appearing left greater than right bilateral perihilar and bibasilar opacities that could represent atelectasis and/or pneumonia with possible small pleural effusions and possible superimposed mild pulmonary vascular congestion/pulmonary edema.  This report was finalized on 6/21/2024 1:05 PM by Julio Thomas MD on Workstation: [id number]       Results for orders placed during the hospital encounter of 05/30/24    Adult Transthoracic Echo Complete W/ Cont if Necessary Per Protocol    Interpretation Summary    Left ventricular systolic function is hyperdynamic (EF > 70%). Calculated left ventricular EF = 75% Global longitudinal LV strain (GLS) = -17.8%. Left ventricle strain data was reviewed by the physician and found to be accurate. Global longitudinal LV strain is normal however there is regional abnormality with apical sparing suggestive of amyloid heart disease. However there is also basal ptal hypertrophy suggestive of hypertrophic cardiomyopathy. Wall motion abnormality is also noted in the basal septum and cannot rule out ischemic component.Consider additional imaging such as cardiac MRI and further evaluation also for amyloid heart disease as clinically indicated. The left ventricular cavity is small in size. Left ventricular wall thickness is consistent with moderate to severe septal asymmetric hypertrophy. There is hypokinesis of the left ventricular basal septum. Left ventricular diastolic function is consistent with (grade II w/high LAP) pseudonormalization.    The left atrial cavity is mildly dilated.    No aortic valve regurgitation or stenosis is present. The aortic valve is abnormal in structure. There is mild thickening of the aortic valve.    There is mild, bileaflet mitral valve thickening present. Trace mitral valve regurgitation is present. No significant mitral valve stenosis is present.    Moderate tricuspid valve regurgitation is present. Estimated right ventricular systolic pressure  from tricuspid regurgitation is markedly elevated (>55 mmHg). Calculated right ventricular systolic pressure from tricuspid regurgitation is 74 mmHg.        Assessment & Plan   Assessment and Plan:         Acute UTI (urinary tract infection)    Type 2 diabetes mellitus with hyperglycemia, with long-term current use of insulin    CAD (coronary artery disease)    Hx of CABG    Chronic diastolic heart failure    CSA (central sleep apnea)    HTN (hypertension)    History of stroke    CKD (chronic kidney disease) stage 3, GFR 30-59 ml/min    Peripheral arterial disease    Sepsis    Hyperkalemia    Metabolic encephalopathy    AMENA (acute kidney injury)    Paroxysmal atrial fibrillation    ASSESSMENT:  AMENA likely prerenal ATN 2/2 urosepsis with hemodynamic changes; on CKD III etiology likely diabetic and hypertensive nephropathy with baseline sCr ~0.9-1.2 mg/dL  Hyperkalemia, now resolved  UTI, urine cultures with gram neg bacilli  Leukocytosis  Metabolic encephalopathy  PAD  Hx stroke  HTN  CHF  CAD  DM2  Chronic splenic vein thrombosis  Possible hepatocellular disease/cirrhosis  Likely benign subpleural nodule with bilateral lymphadenopathy  Hiatal hernia    Last TTE 10/5/22 with EF 66%, grade II DD    PLAN :     AMENA likely prerenal ATN 2/2 urosepsis with hemodynamic changes; on CKD III etiology likely diabetic and hypertensive nephropathy with baseline sCr ~0.9-1.2 mg/dL  Initiated on dialysis  6/15 due to worsening renal function.  Last hemodialysis was performed on June 17, 2024, dialysis catheter was removed on June 23, 2024, no further need for dialysis  Renal function continues to improve  Mental status improved  Paraproteinemia workup unremarkable  Likely related to some cardiorenal syndrome as echocardiogram consistent with possible amyloidosis with grade 2 diastolic dysfunctions  Continue bumex 1 mg po bid  Remains nonoliguric volume seems to be acceptable  May need further workup and treatment for cardiac  amyloidosis  Avoid NSAIDs, nephrotoxic agents.   We will follow and coordinate with team  Monitor closely for renal recovery  Outpatient follow-up in 2 to 3 weeks postdischarge, overall stable from renal standpoint, discussed with family at bedside

## 2024-06-26 NOTE — CASE MANAGEMENT/SOCIAL WORK
Case Management Discharge Note      Final Note: home with caretenders          Selected Continued Care - Discharged on 6/25/2024 Admission date: 5/30/2024 - Discharge disposition: Home or Self Care      Destination    No services have been selected for the patient.                Durable Medical Equipment       Service Provider Selected Services Address Phone Fax Patient Preferred    [facility] [id number], [place] [address] [phone] --              Dialysis/Infusion    No services have been selected for the patient.                Home Medical Care       Service Provider Selected Services Address Phone Fax Patient Preferred    [facility], [place],[facility] [address], [place], [address], [place] [address] [phone] --              Therapy    No services have been selected for the patient.                Community Resources    No services have been selected for the patient.                Community & DME    No services have been selected for the patient.                    Transportation Services  Private: Car    Final Discharge Disposition Code: 06 - home with home health care

## 2024-06-27 ENCOUNTER — TELEMEDICINE (OUTPATIENT)
Dept: CARDIOLOGY | Facility: CLINIC | Age: 74
End: 2024-06-27
Payer: MEDICARE

## 2024-06-27 VITALS
BODY MASS INDEX: 28.49 KG/M2 | SYSTOLIC BLOOD PRESSURE: 118 MMHG | HEART RATE: 61 BPM | DIASTOLIC BLOOD PRESSURE: 72 MMHG | HEIGHT: 68 IN

## 2024-06-27 DIAGNOSIS — I50.32 CHRONIC DIASTOLIC CHF (CONGESTIVE HEART FAILURE): ICD-10-CM

## 2024-06-27 DIAGNOSIS — I25.10 CORONARY ARTERY DISEASE INVOLVING NATIVE CORONARY ARTERY OF NATIVE HEART WITHOUT ANGINA PECTORIS: Primary | ICD-10-CM

## 2024-06-27 DIAGNOSIS — I73.9 PAD (PERIPHERAL ARTERY DISEASE): ICD-10-CM

## 2024-06-27 DIAGNOSIS — I48.0 PAF (PAROXYSMAL ATRIAL FIBRILLATION): ICD-10-CM

## 2024-06-27 DIAGNOSIS — I27.20 PULMONARY HYPERTENSION: ICD-10-CM

## 2024-06-27 DIAGNOSIS — G47.33 OSA (OBSTRUCTIVE SLEEP APNEA): ICD-10-CM

## 2024-06-27 RX ORDER — AMIODARONE HYDROCHLORIDE 200 MG/1
200 TABLET ORAL
Qty: 30 TABLET | Refills: 3 | Status: SHIPPED | OUTPATIENT
Start: 2024-06-27

## 2024-06-27 RX ORDER — BUMETANIDE 1 MG/1
1 TABLET ORAL 2 TIMES DAILY
Qty: 180 TABLET | Refills: 1 | Status: SHIPPED | OUTPATIENT
Start: 2024-06-27

## 2024-06-27 NOTE — PROGRESS NOTES
Date of Office Visit: 24  Encounter Provider: LEIDY Mejía  Place of Service: [facility] CARDIOLOGY  Patient Name: Dilip Verma  :1950    Chief Complaint   Patient presents with    Coronary artery disease involving native coronary artery of    Follow-up   :     HPI: Dilip Verma is a 74 y.o. male  with hypertension, hyperlipidemia, diabetes mellitus, coronary artery disease, congestive heart failure, thalamic hemorrhage, pulmonary hypertension, atrial fibrillation and obstructive sleep apnea.     He is followed by Dr. Amador Reyes.  I will visit with him in follow-up today have reviewed his medical record.     He was admitted to [facility] in 2019 for syncope.  He was orthostatic with supine hypertension.  Clonidine was discontinued and hydralazine was increased.  He then presented to [facility], [place], operated by [facility] on 2019 with uncontrolled blood pressure and blurry vision.  He had an abnormal BNP and troponin.  Echocardiogram showed an ejection fraction of 53%, moderate left ventricular hypertrophy, mild to moderate left atrial enlargement and no significant valvular disease.  He had a Holter monitor which showed nonsustained atrial tachycardia.  Due to his elevated troponin he had heart catheterization which showed normal ejection fraction with multivessel coronary artery disease.  Carotid duplex showed mild bilateral carotid artery stenosis.  Transthoracic echocardiogram showed normal left ventricular systolic function with an ejection fraction of 55%, grade 3 diastolic dysfunction, no significant valvular disease.  He did have severe pulmonary hypertension on that.  He underwent coronary artery bypass grafting with LIMA to LAD, vein graft to obtuse marginal 1, vein graft to distal right coronary artery.  He had some postoperative bradycardia and his beta-blocker was held.  He then had postoperative atrial fibrillation and was discharged on amiodarone.      He then was readmitted in early November 2019 with chest pain and non-STEMI.  Perfusion stress test suggested inferior wall and lateral wall ischemia.  Echocardiogram showed normal left ventricular systolic function with an EF of 62%, moderate left ventricular hypertrophy, grade 2 diastolic dysfunction and severe pulmonary hypertension.  Heart catheterization completed 11/6/2019 showed widely patent grafts.  His PA pressure was 80/30 with a wedge of 20.  He received IV diureses.  He had increased creatinine and BUN.  He was transition back to p.o. Lasix.   On December 1, 2020 he fell and broke his leg.  He had surgery for that.  He was admitted in December 2020 with E. coli bacteremia and sepsis.   He had revision of left hip in January 2022 and then was diagnosed with COVID in July 2022 after a Fourth of July gathering.  Follow-up echocardiogram October 2022 showed grade 2 diastolic dysfunction with an EF of 66%.       He was hospitalized for severely uncontrolled hypertension.  He had about a 20 pound weight gain and describe orthopnea secondary to volume overload.  Nephrology help to manage diuretic and he was treated with IV Lasix twice daily.  He diuresed well and was started on nifedipine and hydralazine with improved blood pressure.  He then had some low blood pressure readings so nifedipine was stopped.     He was admitted at the end of May due to hallucinations and painful urination.  He was diagnosed with urosepsis and had sinus tachycardia.  He was treated with antibiotics.  In he had hyperkalemia and nephrology help to manage that.  He then went into A-fib with RVR and was given IV digoxin.  He had some hypotension.  A-fib was a recurrent diagnoses as he previously had A-fib after open heart surgery in 2019.  He was started on amiodarone bolus with drip per protocol.  He converted to normal sinus rhythm with amiodarone and was also started on Eliquis 5 mg twice daily.  He acutely went on dialysis.   "There was some concern of amyloid heart however he had no signs of heavy or light chains on UPEP and there was no M spike.  No additional workup was recommended.  Echocardiogram during admission showed hyperdynamic left ventricular systolic function with hypertrophy, grade 2 diastolic dysfunction, aortic valve sclerosis but no stenosis or regurgitation, mitral valve thickening with trace regurgitation, moderate tricuspid valve regurgitation with RVSP 74 mmHg.    He presents today via MyChart video accompanied by his spouse.  He has oxygen in place.  He has been doing reasonably well since discharge.  He has home health services as well as physical therapy.  He is tolerating Eliquis in addition to aspirin with no signs of bleeding.  He had dialysis the past 2 Mondays and is being followed closely by nephrology with routine labs.  No near-syncope or syncope.  Blood pressure has been stable with half tablet metoprolol twice daily.        Allergies   Allergen Reactions    Ace Inhibitors Angioedema    Angiotensin Receptor Blockers Angioedema and Unknown (See Comments)     Angioneurotic edema    Dapagliflozin Other (See Comments)     UTI,  rectal abcess    Losartan Angioedema     Angioneurotic edema    Seroquel [Quetiapine] Hallucinations    Xanax [Alprazolam] Unknown - High Severity    Amlodipine Swelling    Ativan [Lorazepam] Hallucinations    Baclofen Hallucinations    Misc. Sulfonamide Containing Compounds Unknown (See Comments)    Minoxidil Confusion    Tetracycline Rash     bliisters in mouth             Family and social history reviewed.     ROS  All other systems were reviewed and are negative          Objective:     Vitals:    06/27/24 1044   BP: 118/72   BP Location: Left arm   Patient Position: Sitting   Pulse: 61   Height: 172.7 cm (68\")     Body mass index is 28.49 kg/m².    PHYSICAL EXAM:  Physical Exam    Procedures      Current Outpatient Medications   Medication Sig Dispense Refill    amiodarone " (PACERONE) 200 MG tablet Take 1 tablet by mouth Daily. 30 tablet 3    apixaban (ELIQUIS) 5 MG tablet tablet Take 1 tablet by mouth Every 12 (Twelve) Hours. Indications: Atrial Fibrillation 60 tablet 5    aspirin 81 MG EC tablet Take 1 tablet by mouth Every Night.      brimonidine (ALPHAGAN) 0.2 % ophthalmic solution Administer 1 drop to both eyes 2 (Two) Times a Day.      bumetanide (BUMEX) 1 MG tablet Take 1 tablet by mouth 2 (Two) Times a Day. 180 tablet 1    Cholecalciferol (Vitamin D-3) 125 MCG (5000 UT) tablet Take 1 tablet by mouth Daily.      DULoxetine (CYMBALTA) 30 MG capsule Take 1 capsule by mouth Every Night.      hydrocortisone 1 % cream Apply 1 Application topically to the appropriate area as directed 2 (Two) Times a Day. 28 g 0    Insulin Glargine, 2 Unit Dial, (TOUJEO) 300 UNIT/ML solution pen-injector injection Inject 10 Units under the skin into the appropriate area as directed Daily.      insulin lispro (humaLOG) 100 UNIT/ML injection Inject 0-14 Units under the skin into the appropriate area as directed 3 (Three) Times a Day Before Meals. (Patient taking differently: Inject 0-14 Units under the skin into the appropriate area as directed 3 (Three) Times a Day Before Meals. SLIDING SCALE  100-150 - 4 units  151-200 - 5 units  201-250 - 6 units  251-300 - 7 units  301-350 - 8 units  351-400 - 9 units  401+ - 10 units)  12    latanoprost (XALATAN) 0.005 % ophthalmic solution Administer 1 drop to both eyes every night at bedtime.      Magnesium 400 MG capsule Take 400 mg by mouth As Needed.      metoprolol tartrate (LOPRESSOR) 25 MG tablet Take 0.5 tablets by mouth Every 12 (Twelve) Hours. 90 tablet 1    multivitamin with minerals (MULTIVITAMIN ADULTS PO) Take 1 tablet by mouth Daily.      sildenafil (REVATIO) 20 MG tablet Take 1 tablet by mouth As Needed.      terazosin (HYTRIN) 1 MG capsule Take 1 capsule by mouth Every Night. 30 capsule 0    testosterone (ANDROGEL) 25 MG/2.5GM (1%) gel gel Place 25  mg on the skin as directed by provider 2 (Two) Times a Week.      thiamine (VITAMIN B1) 100 MG tablet Take 1 tablet by mouth Daily. 30 tablet 0    traMADol (ULTRAM) 50 MG tablet Take 1 tablet by mouth At Night As Needed.       No current facility-administered medications for this visit.     Assessment:       Diagnosis Plan   1. Coronary artery disease involving native coronary artery of native heart without angina pectoris        2. PAD (peripheral artery disease)        3. Chronic diastolic CHF (congestive heart failure)        4. PAF (paroxysmal atrial fibrillation)        5. Pulmonary hypertension        6. COCO (obstructive sleep apnea)             No orders of the defined types were placed in this encounter.        Plan:       1.  74-year-old gentleman with coronary artery disease status post coronary artery bypass grafting times 3 July 2019 with LIMA to LAD, vein graft to obtuse marginal 1, vein graft to distal right coronary artery with normal left ventricular systolic function.  Then non-STEMI and  inferolateral wall ischemia on perfusion stress test.  Cardiac catheterization 11/2019 showed patent grafts normal left ventricular systolic function. Preserved LV function on echo June 2023.  -No angina   -Continue aspirin.  He is not on statin therapy due to preference  2.  Paroxysmal C-pyq-kovsuinbb diagnoses June 2024 in the setting of urosepsis.  He converted with amiodarone and is maintained on Eliquis 5 mg twice daily.  He will continue current regimen and I will send in refills.  Encouraged that he keep the appointment next month to follow-up in clinic and he will need an EKG  -This is considered complex management of a chronic medical condition.    3.hypertension .     Blood pressure appears stable based on review of log.  Most values are between 120-130 systolic.  4.  Hyperlipidemia-not on statin therapy and previously refused to add statin   5.  Diabetes mellitus followed by PCP  6.  Obstructive sleep  apnea not treating at this time.  Follows with Dr. Cai.  Reportedly was told he needs another test  7.  Pulmonary hypertension RVSP  74 mmHg on echo June 2024.  Now on oxygen 4 L routinely  8.  Bilateral carotid artery stenosis mild on duplex July 2019  9.  Chronic diastolic congestive heart failure/heart failure preserved ejection fraction on last echo June 2024-He is now on bumetanide 1 mg twice daily. he had dialysis last Monday  10.  Mild digital ischemia bilateral on AMINTA May 2022.  He is following with vascular, Dr. Darci chaudhary  11. History of hypogonadism with prior testosterone replacement treatment.    12.Chronic kidney disease follows with nephrology.  He had temporary dialysis.  He is now followed by Dr. Mayen  13. remote history of thalamic hemorrhage noted on MRI July 2019  14.  Spinal issues-scheduled for a MRI  15. urosepsis 05/2024-improved.  He now has home health services and is working with physical therapy to increase mobility.  16.  Left upper extremity thrombophlebitis but no DVT June 2024      This patient has consented to a telehealth visit via [address] [employer] the visit was scheduled as a Quick [employer] video visit to comply with patient safety concerns in accordance with CDC recommendations.  All vitals recorded within this visit are reported by the patient.  I spent 20 minutes in total including but not limited to the 15minutes spent in direct conversation with this patient.           It has been a pleasure to participate in this patient's care.      Thank you,  LEIDY Mejía      **I used Dragon to dictate this note:**

## 2024-06-28 ENCOUNTER — APPOINTMENT (OUTPATIENT)
Dept: GENERAL RADIOLOGY | Facility: HOSPITAL | Age: 74
End: 2024-06-28
Payer: MEDICARE

## 2024-06-28 ENCOUNTER — READMISSION MANAGEMENT (OUTPATIENT)
Dept: CALL CENTER | Facility: HOSPITAL | Age: 74
End: 2024-06-28
Payer: MEDICARE

## 2024-06-28 ENCOUNTER — APPOINTMENT (OUTPATIENT)
Dept: CT IMAGING | Facility: HOSPITAL | Age: 74
End: 2024-06-28
Payer: MEDICARE

## 2024-06-28 ENCOUNTER — HOSPITAL ENCOUNTER (OUTPATIENT)
Facility: HOSPITAL | Age: 74
Setting detail: OBSERVATION
Discharge: HOME HEALTH | End: 2024-07-01
Attending: STUDENT IN AN ORGANIZED HEALTH CARE EDUCATION/TRAINING PROGRAM | Admitting: STUDENT IN AN ORGANIZED HEALTH CARE EDUCATION/TRAINING PROGRAM
Payer: MEDICARE

## 2024-06-28 ENCOUNTER — DOCUMENTATION (OUTPATIENT)
Age: 74
End: 2024-06-28
Payer: MEDICARE

## 2024-06-28 DIAGNOSIS — R29.6 MULTIPLE FALLS: ICD-10-CM

## 2024-06-28 DIAGNOSIS — N39.0 ACUTE UTI: ICD-10-CM

## 2024-06-28 DIAGNOSIS — R41.82 ALTERED MENTAL STATUS, UNSPECIFIED ALTERED MENTAL STATUS TYPE: Primary | ICD-10-CM

## 2024-06-28 LAB
ALBUMIN SERPL-MCNC: 3.4 G/DL (ref 3.5–5.2)
ALBUMIN/GLOB SERPL: 1 G/DL
ALP SERPL-CCNC: 92 U/L (ref 39–117)
ALT SERPL W P-5'-P-CCNC: 23 U/L (ref 1–41)
ANION GAP SERPL CALCULATED.3IONS-SCNC: 10 MMOL/L (ref 5–15)
AST SERPL-CCNC: 18 U/L (ref 1–40)
BACTERIA UR QL AUTO: ABNORMAL /HPF
BASOPHILS # BLD AUTO: 0.05 10*3/MM3 (ref 0–0.2)
BASOPHILS NFR BLD AUTO: 0.6 % (ref 0–1.5)
BILIRUB SERPL-MCNC: 0.5 MG/DL (ref 0–1.2)
BILIRUB UR QL STRIP: NEGATIVE
BUN SERPL-MCNC: 34 MG/DL (ref 8–23)
BUN/CREAT SERPL: 15.7 (ref 7–25)
CALCIUM SPEC-SCNC: 8.3 MG/DL (ref 8.6–10.5)
CHLORIDE SERPL-SCNC: 102 MMOL/L (ref 98–107)
CLARITY UR: CLEAR
CO2 SERPL-SCNC: 28 MMOL/L (ref 22–29)
COLOR UR: YELLOW
CREAT SERPL-MCNC: 2.16 MG/DL (ref 0.76–1.27)
D-LACTATE SERPL-SCNC: 1 MMOL/L (ref 0.5–2)
DEPRECATED RDW RBC AUTO: 44.8 FL (ref 37–54)
EGFRCR SERPLBLD CKD-EPI 2021: 31.2 ML/MIN/1.73
EOSINOPHIL # BLD AUTO: 0.25 10*3/MM3 (ref 0–0.4)
EOSINOPHIL NFR BLD AUTO: 2.8 % (ref 0.3–6.2)
ERYTHROCYTE [DISTWIDTH] IN BLOOD BY AUTOMATED COUNT: 13 % (ref 12.3–15.4)
GLOBULIN UR ELPH-MCNC: 3.4 GM/DL
GLUCOSE BLDC GLUCOMTR-MCNC: 182 MG/DL (ref 70–130)
GLUCOSE SERPL-MCNC: 170 MG/DL (ref 65–99)
GLUCOSE UR STRIP-MCNC: NEGATIVE MG/DL
HCT VFR BLD AUTO: 33.7 % (ref 37.5–51)
HGB BLD-MCNC: 11.1 G/DL (ref 13–17.7)
HGB UR QL STRIP.AUTO: NEGATIVE
HYALINE CASTS UR QL AUTO: ABNORMAL /LPF
IMM GRANULOCYTES # BLD AUTO: 0.02 10*3/MM3 (ref 0–0.05)
IMM GRANULOCYTES NFR BLD AUTO: 0.2 % (ref 0–0.5)
KETONES UR QL STRIP: NEGATIVE
LEUKOCYTE ESTERASE UR QL STRIP.AUTO: ABNORMAL
LYMPHOCYTES # BLD AUTO: 0.94 10*3/MM3 (ref 0.7–3.1)
LYMPHOCYTES NFR BLD AUTO: 10.4 % (ref 19.6–45.3)
MCH RBC QN AUTO: 31.6 PG (ref 26.6–33)
MCHC RBC AUTO-ENTMCNC: 32.9 G/DL (ref 31.5–35.7)
MCV RBC AUTO: 96 FL (ref 79–97)
MONOCYTES # BLD AUTO: 0.64 10*3/MM3 (ref 0.1–0.9)
MONOCYTES NFR BLD AUTO: 7.1 % (ref 5–12)
NEUTROPHILS NFR BLD AUTO: 7.14 10*3/MM3 (ref 1.7–7)
NEUTROPHILS NFR BLD AUTO: 78.9 % (ref 42.7–76)
NITRITE UR QL STRIP: NEGATIVE
NRBC BLD AUTO-RTO: 0 /100 WBC (ref 0–0.2)
PH UR STRIP.AUTO: 5.5 [PH] (ref 5–8)
PLATELET # BLD AUTO: 243 10*3/MM3 (ref 140–450)
PMV BLD AUTO: 10.3 FL (ref 6–12)
POTASSIUM SERPL-SCNC: 3.8 MMOL/L (ref 3.5–5.2)
PROT SERPL-MCNC: 6.8 G/DL (ref 6–8.5)
PROT UR QL STRIP: NEGATIVE
RBC # BLD AUTO: 3.51 10*6/MM3 (ref 4.14–5.8)
RBC # UR STRIP: ABNORMAL /HPF
REF LAB TEST METHOD: ABNORMAL
SODIUM SERPL-SCNC: 140 MMOL/L (ref 136–145)
SP GR UR STRIP: 1.01 (ref 1–1.03)
SQUAMOUS #/AREA URNS HPF: ABNORMAL /HPF
UROBILINOGEN UR QL STRIP: ABNORMAL
WBC # UR STRIP: ABNORMAL /HPF
WBC NRBC COR # BLD AUTO: 9.04 10*3/MM3 (ref 3.4–10.8)

## 2024-06-28 PROCEDURE — 83605 ASSAY OF LACTIC ACID: CPT | Performed by: EMERGENCY MEDICINE

## 2024-06-28 PROCEDURE — 93005 ELECTROCARDIOGRAM TRACING: CPT | Performed by: EMERGENCY MEDICINE

## 2024-06-28 PROCEDURE — 25010000002 CEFTRIAXONE PER 250 MG: Performed by: EMERGENCY MEDICINE

## 2024-06-28 PROCEDURE — 36415 COLL VENOUS BLD VENIPUNCTURE: CPT

## 2024-06-28 PROCEDURE — 71045 X-RAY EXAM CHEST 1 VIEW: CPT

## 2024-06-28 PROCEDURE — 82948 REAGENT STRIP/BLOOD GLUCOSE: CPT

## 2024-06-28 PROCEDURE — G0378 HOSPITAL OBSERVATION PER HR: HCPCS

## 2024-06-28 PROCEDURE — 85025 COMPLETE CBC W/AUTO DIFF WBC: CPT | Performed by: EMERGENCY MEDICINE

## 2024-06-28 PROCEDURE — 80053 COMPREHEN METABOLIC PANEL: CPT | Performed by: EMERGENCY MEDICINE

## 2024-06-28 PROCEDURE — 70450 CT HEAD/BRAIN W/O DYE: CPT

## 2024-06-28 PROCEDURE — 99285 EMERGENCY DEPT VISIT HI MDM: CPT

## 2024-06-28 PROCEDURE — 87086 URINE CULTURE/COLONY COUNT: CPT | Performed by: EMERGENCY MEDICINE

## 2024-06-28 PROCEDURE — 81001 URINALYSIS AUTO W/SCOPE: CPT | Performed by: EMERGENCY MEDICINE

## 2024-06-28 PROCEDURE — 87040 BLOOD CULTURE FOR BACTERIA: CPT | Performed by: EMERGENCY MEDICINE

## 2024-06-28 PROCEDURE — 93010 ELECTROCARDIOGRAM REPORT: CPT | Performed by: STUDENT IN AN ORGANIZED HEALTH CARE EDUCATION/TRAINING PROGRAM

## 2024-06-28 RX ORDER — ACETAMINOPHEN 325 MG/1
650 TABLET ORAL EVERY 4 HOURS PRN
Status: DISCONTINUED | OUTPATIENT
Start: 2024-06-28 | End: 2024-07-01 | Stop reason: HOSPADM

## 2024-06-28 RX ORDER — BISACODYL 5 MG/1
5 TABLET, DELAYED RELEASE ORAL DAILY PRN
Status: DISCONTINUED | OUTPATIENT
Start: 2024-06-28 | End: 2024-07-01 | Stop reason: HOSPADM

## 2024-06-28 RX ORDER — TERAZOSIN 1 MG/1
1 CAPSULE ORAL NIGHTLY
Status: DISCONTINUED | OUTPATIENT
Start: 2024-06-28 | End: 2024-07-01 | Stop reason: HOSPADM

## 2024-06-28 RX ORDER — LATANOPROST 50 UG/ML
1 SOLUTION/ DROPS OPHTHALMIC NIGHTLY
Status: DISCONTINUED | OUTPATIENT
Start: 2024-06-28 | End: 2024-07-01 | Stop reason: HOSPADM

## 2024-06-28 RX ORDER — BISACODYL 10 MG
10 SUPPOSITORY, RECTAL RECTAL DAILY PRN
Status: DISCONTINUED | OUTPATIENT
Start: 2024-06-28 | End: 2024-07-01 | Stop reason: HOSPADM

## 2024-06-28 RX ORDER — DULOXETIN HYDROCHLORIDE 30 MG/1
30 CAPSULE, DELAYED RELEASE ORAL NIGHTLY
Status: DISCONTINUED | OUTPATIENT
Start: 2024-06-28 | End: 2024-07-01 | Stop reason: HOSPADM

## 2024-06-28 RX ORDER — ONDANSETRON 2 MG/ML
4 INJECTION INTRAMUSCULAR; INTRAVENOUS EVERY 6 HOURS PRN
Status: DISCONTINUED | OUTPATIENT
Start: 2024-06-28 | End: 2024-07-01 | Stop reason: HOSPADM

## 2024-06-28 RX ORDER — LABETALOL HYDROCHLORIDE 5 MG/ML
10 INJECTION, SOLUTION INTRAVENOUS EVERY 6 HOURS PRN
Status: DISCONTINUED | OUTPATIENT
Start: 2024-06-28 | End: 2024-06-29

## 2024-06-28 RX ORDER — UREA 10 %
5 LOTION (ML) TOPICAL NIGHTLY PRN
Status: DISCONTINUED | OUTPATIENT
Start: 2024-06-28 | End: 2024-07-01 | Stop reason: HOSPADM

## 2024-06-28 RX ORDER — ASPIRIN 81 MG/1
81 TABLET ORAL NIGHTLY
Status: DISCONTINUED | OUTPATIENT
Start: 2024-06-28 | End: 2024-07-01 | Stop reason: HOSPADM

## 2024-06-28 RX ORDER — SODIUM CHLORIDE 9 MG/ML
40 INJECTION, SOLUTION INTRAVENOUS AS NEEDED
Status: DISCONTINUED | OUTPATIENT
Start: 2024-06-28 | End: 2024-07-01 | Stop reason: HOSPADM

## 2024-06-28 RX ORDER — POLYETHYLENE GLYCOL 3350 17 G/17G
17 POWDER, FOR SOLUTION ORAL DAILY PRN
Status: DISCONTINUED | OUTPATIENT
Start: 2024-06-28 | End: 2024-07-01 | Stop reason: HOSPADM

## 2024-06-28 RX ORDER — ACETAMINOPHEN 160 MG/5ML
650 SOLUTION ORAL EVERY 4 HOURS PRN
Status: DISCONTINUED | OUTPATIENT
Start: 2024-06-28 | End: 2024-06-29

## 2024-06-28 RX ORDER — ACETAMINOPHEN 650 MG/1
650 SUPPOSITORY RECTAL EVERY 4 HOURS PRN
Status: DISCONTINUED | OUTPATIENT
Start: 2024-06-28 | End: 2024-06-29

## 2024-06-28 RX ORDER — BRIMONIDINE TARTRATE 2 MG/ML
1 SOLUTION/ DROPS OPHTHALMIC 2 TIMES DAILY
Status: DISCONTINUED | OUTPATIENT
Start: 2024-06-28 | End: 2024-07-01 | Stop reason: HOSPADM

## 2024-06-28 RX ORDER — SODIUM CHLORIDE 0.9 % (FLUSH) 0.9 %
10 SYRINGE (ML) INJECTION AS NEEDED
Status: DISCONTINUED | OUTPATIENT
Start: 2024-06-28 | End: 2024-07-01 | Stop reason: HOSPADM

## 2024-06-28 RX ORDER — BUMETANIDE 1 MG/1
1 TABLET ORAL 2 TIMES DAILY
Status: DISCONTINUED | OUTPATIENT
Start: 2024-06-28 | End: 2024-07-01 | Stop reason: HOSPADM

## 2024-06-28 RX ORDER — AMIODARONE HYDROCHLORIDE 200 MG/1
200 TABLET ORAL
Status: DISCONTINUED | OUTPATIENT
Start: 2024-06-29 | End: 2024-07-01 | Stop reason: HOSPADM

## 2024-06-28 RX ORDER — AMOXICILLIN 250 MG
2 CAPSULE ORAL 2 TIMES DAILY PRN
Status: DISCONTINUED | OUTPATIENT
Start: 2024-06-28 | End: 2024-07-01 | Stop reason: HOSPADM

## 2024-06-28 RX ORDER — SODIUM CHLORIDE 0.9 % (FLUSH) 0.9 %
10 SYRINGE (ML) INJECTION EVERY 12 HOURS SCHEDULED
Status: DISCONTINUED | OUTPATIENT
Start: 2024-06-28 | End: 2024-07-01 | Stop reason: HOSPADM

## 2024-06-28 RX ORDER — ONDANSETRON 4 MG/1
4 TABLET, ORALLY DISINTEGRATING ORAL EVERY 6 HOURS PRN
Status: DISCONTINUED | OUTPATIENT
Start: 2024-06-28 | End: 2024-07-01 | Stop reason: HOSPADM

## 2024-06-28 RX ORDER — SODIUM CHLORIDE 0.9 % (FLUSH) 0.9 %
10 SYRINGE (ML) INJECTION AS NEEDED
Status: DISCONTINUED | OUTPATIENT
Start: 2024-06-28 | End: 2024-06-29

## 2024-06-28 RX ADMIN — CEFTRIAXONE SODIUM 1000 MG: 1 INJECTION, POWDER, FOR SOLUTION INTRAMUSCULAR; INTRAVENOUS at 21:51

## 2024-06-28 NOTE — PROGRESS NOTES
Pt called service after being see by EMS this afternoon. EKG with RBBB and chronic depressions in inferior leads    Per EMS, he is altered, has possible UTI and severe LE edema. Recommendation was for pt to go to ER. He was agreeable and his spouse was not, EMS going to try to talk pt into going to ER to get evaluated

## 2024-06-28 NOTE — ED PROVIDER NOTES
EMERGENCY DEPARTMENT ENCOUNTER    Room Number:  27/27  PCP: Fernando Camargo DO  Historian: EMS      HPI:  Chief Complaint: Altered mental status multiple falls  A complete HPI/ROS/PMH/PSH/SH/FH are unobtainable due to: None   Context: Dilip Verma is a 74 y.o. male who presents to the ED c/o altered mental status and multiple falls.  Patient was admitted to the hospital here for altered mental status urinary tract infection.  Patient also had renal failure and required short-term dialysis.  Patient was discharged 3 days ago.  Highly recommended that patient go to skilled nursing facility the patient was brought home by spouse.  Patient had fall that required EMS to come get him up yesterday.  Had second fall today where EMS was required to help him up.  Patient confused.  Patient thinks he was admitted for snakebites.  EMS to transport patient however wife did not want this.  Patient states he is having dysuria and urinary frequency again        PAST MEDICAL HISTORY  Active Ambulatory Problems     Diagnosis Date Noted    Anxiety     Colon polyp     Type 2 diabetes mellitus with hyperglycemia, with long-term current use of insulin     Erectile dysfunction     Hyperlipidemia     Type 2 acute myocardial infarction 07/12/2019    CAD (coronary artery disease) 07/20/2019    Hx of CABG 08/19/2019    Chronic diastolic heart failure 09/11/2019    Hypersomnia due to medical condition 09/14/2019    CSA (central sleep apnea) 09/14/2019    Periodic breathing 09/14/2019    HTN (hypertension) 02/26/2020    History of stroke 12/01/2020    CKD (chronic kidney disease) stage 3, GFR 30-59 ml/min 12/01/2020    Urinary retention 12/02/2020    Acute on chronic renal failure 12/03/2020    Acute UTI (urinary tract infection) 12/16/2020    Acute on chronic diastolic (congestive) heart failure 04/12/2021    Bacteriuria 04/16/2021    Rectal pain 04/16/2021    Status post total replacement of hip 01/11/2022    Vitamin D deficiency  12/29/2022    Post-acute COVID-19 syndrome 12/29/2022    Insulin dose changed 12/29/2022    Arthropathy associated with neurological disorder 12/29/2022    Personal history of colonic polyps 01/30/2023    Type 2 diabetes mellitus with diabetic neuropathy, with long-term current use of insulin 06/05/2023    Cervical spinal stenosis 07/20/2023    Abscess of skin 10/01/2023    Overweight 09/06/2023    Cervical stenosis of spine 10/03/2023    Spinal stenosis, lumbar region, without neurogenic claudication 10/03/2023    Medically noncompliant 10/03/2023    Chronic heart failure with preserved ejection fraction (HFpEF) 10/07/2023    Carotid stenosis 03/28/2024    Peripheral arterial disease 03/28/2024    Sepsis 05/30/2024    Hyperkalemia 05/30/2024    Metabolic encephalopathy 05/30/2024    AMENA (acute kidney injury) 05/31/2024    Paroxysmal atrial fibrillation 06/06/2024     Resolved Ambulatory Problems     Diagnosis Date Noted    Orthostasis 07/20/2019    Closed nondisplaced intertrochanteric fracture of left femur 12/01/2020    Acute posthemorrhagic anemia 12/04/2020    Severe uncontrolled hypertension 06/05/2023    Cellulitis 10/03/2023    Gluteal abscess 10/03/2023     Past Medical History:   Diagnosis Date    Alcoholism 1989    CORI positive     Anemia     Ataxia     Atypical chest pain 04/19/2022    Balance disorder     Cataract     Cervical radiculopathy 11/11/2019    Cervical spinal cord compression     Chronic diastolic (congestive) heart failure     Chronic kidney disease     Chronic pancreatitis     Closed left subtrochanteric femur fracture 12/01/2020    Colon polyps     Constipation     Contracture, right hand     Coronary artery disease     COVID-19 07/2022    DDD (degenerative disc disease), cervical     Diabetes mellitus, type II     Diabetic retinopathy     Difficulty walking     Dysphagia     Elevated brain natriuretic peptide (BNP) level 08/2014    Elevated LFTs 03/2021    Fissure, anal 2022    Foot drop      Fuchs' corneal dystrophy of right eye     Glaucoma     History of alcohol abuse     Hypersomnia     Hypertension     Hypertensive urgency 02/25/2020    Insomnia     Kidney stones     LV dysfunction 06/2016    Lyme disease     Lymphadenopathy syndrome 05/2021    Macular edema     Myocardial infarction 11/05/2019    Myocardial infarction 07/12/2019    Neuropathy in diabetes     Non-celiac gluten sensitivity     Osteoporosis     Oxygen dependent     PAD (peripheral artery disease)     PNA (pneumonia) 06/2016    Polyneuropathy     PTSD (post-traumatic stress disorder)     Pulmonary hypertension     Pulmonary nodule     Senile ectropion of both lower eyelids 02/2022    Sleep apnea     Spinal stenosis in cervical region     Stroke 2012    Syncope and collapse 07/06/2019    UTI (urinary tract infection)     Vision loss          PAST SURGICAL HISTORY  Past Surgical History:   Procedure Laterality Date    CARDIAC CATHETERIZATION N/A 07/15/2019    Procedure: Coronary angiography;  Surgeon: Carrie Price MD;  Location: [facility] CATH INVASIVE LOCATION;  Service: Cardiovascular    CARDIAC CATHETERIZATION N/A 07/15/2019    Procedure: Left Heart Cath;  Surgeon: Carrie Price MD;  Location: [facility] CATH [facility]IVE LOC[facility];  Service: Cardiovascular    CARDIAC CATHETERIZATION N/A 07/15/2019    Procedure: Left ventriculography;  Surgeon: Carrie Price MD;  Location:  [facility] CATH INVASIVE LOCATION;  Service: Cardiovascular    CARDIAC CATHETERIZATION  07/15/2019    Procedure: Functional Flow [place];  Surgeon: Carrie Price MD;  Location: [facility] CATH INVASIVE LOCATION;  Service: Cardiovascular    CARDIAC CATHETERIZATION N/A 11/06/2019    Procedure: Right and Left Heart Cath;  Surgeon: Mya Smith MD;  Location: [facility] CATH INVASIVE LOCATION;  Service: Cardiovascular    CARDIAC CATHETERIZATION N/A 11/06/2019    Procedure: Coronary angiography;  Surgeon: Mya Smith MD;  Location: [facility] CATH INVASIVE LOCATION;  Service:  Cardiovascular    CARDIAC SURGERY      CATARACT EXTRACTION Left 2014    CATARACT EXTRACTION Right 11/2016    PHACO/IOL, DR. LEN DENTON    COLONOSCOPY N/A 03/16/2023    ENTIRE COLON WNL, RESCOPE IN 5 YRS, DR. LINDA LIZARRAGA AT [facility]    COLONOSCOPY W/ POLYPECTOMY N/A 01/02/2015    A FEW DIVERTICULA IN SIGMOID, 6 MM TUBULOVILLOUS ADENOMA POLYP IN RECTUM, SMALL HEMORRHOIDS, MELANOSIS COLI, DR. AVTAR JEONG AT [facility]    CORONARY ARTERY BYPASS GRAFT N/A 07/18/2019    Procedure: INTRAOPERATIVE SHOAIB; STERNOTOMY CORONARY ARTERY BYPASS x 3  USING LEFT INTERNAL MAMMARY ARTERY GRAFT UTILIZING ENDOSCOPICALLY HARVESTED RIGHT GREATER SAPHENOUS VEIN AND PRP.;  Surgeon: Bill Devi MD;  Location: [facility] MAIN OR;  Service: Cardiothoracic    CYSTOSCOPY BLADDER STONE LITHOTRIPSY N/A     DENTAL PROCEDURE Bilateral     3 surgeries inder implants    FEMUR IM NAILING/RODDING Left 12/03/2020    Procedure: LEFT HIP INTRAMEDULLARY NAIL;  Surgeon: Niraj Ravi MD;  Location: [facility] MAIN OR;  Service: Orthopedic Spine;  Laterality: Left;    INCISION AND DRAINAGE PERIRECTAL ABSCESS N/A 10/04/2023    Procedure: Incision and drainage of perianal and buttock abscess;  Surgeon: Herbie Villatoro MD;  Location: [facility] MAIN OR;  Service: General;  Laterality: N/A;    INGUINAL HERNIA REPAIR Bilateral     TOENAIL EXCISION  06/2022    TONSILLECTOMY Bilateral     TOTAL HIP ARTHROPLASTY REVISION Left 01/11/2022    Procedure: TOTAL HIP ARTHROPLASTY REVISION- POSTERIOR;  Surgeon: Jurgen Candelaria II, MD;  Location: [facility] MAIN OR;  Service: Orthopedics;  Laterality: Left;    VASECTOMY N/A          FAMILY HISTORY  Family History   Problem Relation Age of Onset    Heart disease Mother     Hypertension Mother     Hyperlipidemia Mother     Depression Mother     Cancer Mother         uterine    Arrhythmia Mother     Uterine cancer Mother     Mental illness Mother     Migraines Mother     Hyperlipidemia Father     Heart disease  Father     Hypertension Father     Kidney disease Father     Thyroid disease Father     Heart failure Father     Depression Sister     Alcohol abuse Brother     Thyroid disease Paternal Uncle     Lung disease Paternal Uncle     Stroke Maternal Grandmother     Glaucoma Maternal Grandmother     Heart attack Paternal Grandmother     Stroke Paternal Grandmother     Alcohol abuse Paternal Grandfather     Malig Hyperthermia Neg Hx          SOCIAL HISTORY  Social History     Socioeconomic History    Marital status:    Tobacco Use    Smoking status: Former     Current packs/day: 0.00     Average packs/day: 0.8 packs/day for 16.0 years (12.0 ttl pk-yrs)     Types: Cigarettes     Start date: 1984     Quit date: 2000     Years since quittin.5     Passive exposure: Past    Smokeless tobacco: Never    Tobacco comments:     Start age:39/Stopping age:47, 3/4 PPD   Vaping Use    Vaping status: Never Used   Substance and Sexual Activity    Alcohol use: Not Currently     Comment: none since     Drug use: No    Sexual activity: Not Currently     Birth control/protection: Vasectomy         ALLERGIES  Ace inhibitors, Angiotensin receptor blockers, Dapagliflozin, Losartan, Seroquel [quetiapine], Xanax [alprazolam], Amlodipine, Ativan [lorazepam], Baclofen, Misc. sulfonamide containing compounds, Minoxidil, and Tetracycline        REVIEW OF SYSTEMS  Review of Systems   Confusion      PHYSICAL EXAM  ED Triage Vitals [24 1827]   Temp Heart Rate Resp BP SpO2   97.2 °F (36.2 °C) 62 18 (!) 195/80 98 %      Temp src Heart Rate Source Patient Position BP Location FiO2 (%)   Oral Monitor Lying -- --       Physical Exam      GENERAL: no acute distress.  Patient is confused  HENT: nares patent  EYES: no scleral icterus  CV: regular rhythm, normal rate  RESPIRATORY: normal effort  ABDOMEN: soft.  Mild suprapubic tenderness  MUSCULOSKELETAL: no deformity  NEURO: alert, moves all extremities, follows commands  PSYCH:   calm, cooperative  SKIN: warm, dry    Vital signs and nursing notes reviewed.          LAB RESULTS  Recent Results (from the past 24 hour(s))   ECG 12 Lead Dyspnea    Collection Time: 06/28/24  7:01 PM   Result Value Ref Range    QT Interval 470 ms    QTC Interval 500 ms   Urinalysis With Microscopic If Indicated (No Culture) - Urine, Clean Catch    Collection Time: 06/28/24  7:03 PM    Specimen: Urine, Clean Catch   Result Value Ref Range    Color, UA Yellow Yellow, Straw    Appearance, UA Clear Clear    pH, UA 5.5 5.0 - 8.0    Specific Gravity, UA 1.009 1.005 - 1.030    Glucose, UA Negative Negative    Ketones, UA Negative Negative    Bilirubin, UA Negative Negative    Blood, UA Negative Negative    Protein, UA Negative Negative    Leuk Esterase, UA Moderate (2+) (A) Negative    Nitrite, UA Negative Negative    Urobilinogen, UA 0.2 E.U./dL 0.2 - 1.0 E.U./dL   Urinalysis, Microscopic Only - Urine, Clean Catch    Collection Time: 06/28/24  7:03 PM    Specimen: Urine, Clean Catch   Result Value Ref Range    RBC, UA 0-2 None Seen, 0-2 /HPF    WBC, UA 21-50 (A) None Seen, 0-2 /HPF    Bacteria, UA None Seen None Seen /HPF    Squamous Epithelial Cells, UA 0-2 None Seen, 0-2 /HPF    Hyaline Casts, UA 0-2 None Seen /LPF    Methodology Automated Microscopy    Comprehensive Metabolic Panel    Collection Time: 06/28/24  7:14 PM    Specimen: Blood   Result Value Ref Range    Glucose 170 (H) 65 - 99 mg/dL    BUN 34 (H) 8 - 23 mg/dL    Creatinine 2.16 (H) 0.76 - 1.27 mg/dL    Sodium 140 136 - 145 mmol/L    Potassium 3.8 3.5 - 5.2 mmol/L    Chloride 102 98 - 107 mmol/L    CO2 28.0 22.0 - 29.0 mmol/L    Calcium 8.3 (L) 8.6 - 10.5 mg/dL    Total Protein 6.8 6.0 - 8.5 g/dL    Albumin 3.4 (L) 3.5 - 5.2 g/dL    ALT (SGPT) 23 1 - 41 U/L    AST (SGOT) 18 1 - 40 U/L    Alkaline Phosphatase 92 39 - 117 U/L    Total Bilirubin 0.5 0.0 - 1.2 mg/dL    Globulin 3.4 gm/dL    A/G Ratio 1.0 g/dL    BUN/Creatinine Ratio 15.7 7.0 - 25.0    Anion  Gap 10.0 5.0 - 15.0 mmol/L    eGFR 31.2 (L) >60.0 mL/min/1.73   CBC Auto Differential    Collection Time: 06/28/24  7:14 PM    Specimen: Blood   Result Value Ref Range    WBC 9.04 3.40 - 10.80 10*3/mm3    RBC 3.51 (L) 4.14 - 5.80 10*6/mm3    Hemoglobin 11.1 (L) 13.0 - 17.7 g/dL    Hematocrit 33.7 (L) 37.5 - 51.0 %    MCV 96.0 79.0 - 97.0 fL    MCH 31.6 26.6 - 33.0 pg    MCHC 32.9 31.5 - 35.7 g/dL    RDW 13.0 12.3 - 15.4 %    RDW-SD 44.8 37.0 - 54.0 fl    MPV 10.3 6.0 - 12.0 fL    Platelets 243 140 - 450 10*3/mm3    Neutrophil % 78.9 (H) 42.7 - 76.0 %    Lymphocyte % 10.4 (L) 19.6 - 45.3 %    Monocyte % 7.1 5.0 - 12.0 %    Eosinophil % 2.8 0.3 - 6.2 %    Basophil % 0.6 0.0 - 1.5 %    Immature Grans % 0.2 0.0 - 0.5 %    Neutrophils, Absolute 7.14 (H) 1.70 - 7.00 10*3/mm3    Lymphocytes, Absolute 0.94 0.70 - 3.10 10*3/mm3    Monocytes, Absolute 0.64 0.10 - 0.90 10*3/mm3    Eosinophils, Absolute 0.25 0.00 - 0.40 10*3/mm3    Basophils, Absolute 0.05 0.00 - 0.20 10*3/mm3    Immature Grans, Absolute 0.02 0.00 - 0.05 10*3/mm3    nRBC 0.0 0.0 - 0.2 /100 WBC       Ordered the above labs and reviewed the results.        RADIOLOGY  CT Head Without Contrast    Result Date: 6/28/2024  CT HEAD WITHOUT CONTRAST  CLINICAL HISTORY: ams. Multiple falls.  TECHNIQUE: CT scan of the head was obtained with 3 mm axial soft tissue and 2 mm axial bone algorithm algorithm images. No intravenous contrast was administered. Sagittal and coronal reconstructions were obtained.  COMPARISON: No previous similar studies are available for comparison.  FINDINGS:   There is no evidence for a calvarial fracture or an acute extra-axial hemorrhage.  Mild changes of chronic small vessel ischemic phenomena are noted. There is a lacunar disease within the left thalamus. The ventricles, sulci, and cisterns are age-appropriate. The gray-white matter differentiation is within normal limits. The posterior fossa structures are within normal limits.       No  evidence for acute traumatic intracranial pathology.    Radiation dose reduction techniques were utilized, including automated exposure control and exposure modulation based on body size.  This report was finalized on 6/28/2024 8:18 PM by Dr. Dario Yeboah M.D on Workstation: [id number]      XR Chest 1 View    Result Date: 6/28/2024  XR CHEST 1 VW-  HISTORY: Male who is 74 years-old, short of breath  TECHNIQUE: Frontal view of the chest  COMPARISON: 6/21/2024  FINDINGS: The heart size is normal. Sternotomy wires are present. Aorta is calcified. Pulmonary vasculature is mildly congested. A previous right IJ catheter is no longer seen. Likely atelectasis in the left mid to lower lung. No large pleural effusion. No pneumothorax. No acute osseous process.      Likely atelectasis in the left mid to lower lung. Mild vascular congestion. Follow-up as clinical indications persist.  This report was finalized on 6/28/2024 7:58 PM by Dr. Gabe Zimmer M.D on Workstation: [facility]       Ordered the above noted radiological studies.  Chest x-ray independently interpreted by me and shows no evidence of pneumonia          PROCEDURES  Procedures        EKG          EKG time: 1901  Rhythm/Rate: Normal sinus rhythm 68  P waves and SD: Normal P waves  QRS, axis: Intraventricular conduction delay  ST and T waves: Nonspecific ST-T wave changes    Interpreted Contemporaneously by me, independently viewed  Unchanged compared to prior 6/6/2024      MEDICATIONS GIVEN IN ER  Medications   sodium chloride 0.9 % flush 10 mL (has no administration in time range)   cefTRIAXone (ROCEPHIN) 1,000 mg in sodium chloride 0.9 % 100 mL MBP (has no administration in time range)                   MEDICAL DECISION MAKING, PROGRESS, and CONSULTS    All labs have been independently reviewed by me.  All radiology studies have been reviewed by me and I have also reviewed the radiology report.   EKG's independently viewed and interpreted by me.   Discussion below represents my analysis of pertinent findings related to patient's condition, differential diagnosis, treatment plan and final disposition.      Additional sources:  - Discussed/ obtained information from independent historians: History obtained from EMS    - External (non-ED) record review: Epic reviewed patient was just discharged from here 3 days ago    - Chronic or social conditions impacting care: None    - Shared decision making: None      Orders placed during this visit:  Orders Placed This Encounter   Procedures    Blood Culture - Blood,    Blood Culture - Blood,    XR Chest 1 View    CT Head Without Contrast    Comprehensive Metabolic Panel    Urinalysis With Microscopic If Indicated (No Culture) - Urine, Clean Catch    CBC Auto Differential    Urinalysis, Microscopic Only - Urine, Clean Catch    Urinalysis With Culture If Indicated -    Lactic Acid, Plasma    LHA (on-call MD unless specified) Details    ECG 12 Lead Dyspnea    Insert Peripheral IV    Initiate Observation Status    CBC & Differential         Additional orders considered but not ordered:  None        Differential diagnosis includes but is not limited to:    UTI, delirium, toxic metabolic encephalopathy      Independent interpretation of labs, radiology studies, and discussions with consultants:  ED Course as of 06/28/24 2104 Fri Jun 28, 2024 2101 21:01 EDT  Patient presents for altered mental status multiple falls.  Patient was just in the hospital and discharged to home.  Family refused skilled nursing facility.  Patient has fallen in the last 2 days.  Patient is confused here.  Discussed with CCP and patient needs to be admitted as we do not know if she has his best interest in mind does have a few white cells in the urine.  Culture sent patient treated with Rocephin.  Discussed with Dr. Hdz who will admit. [SL]      ED Course User Index  [SL] Andre Zheng MD                 DIAGNOSIS  Final diagnoses:   Altered  mental status, unspecified altered mental status type   Acute UTI   Multiple falls         DISPOSITION  admit            Latest Documented Vital Signs:  As of 21:04 EDT  BP- (!) 184/88 HR- 68 Temp- 97.2 °F (36.2 °C) (Oral) O2 sat- 98%              --    Please note that portions of this were completed with a voice recognition program.       Note Disclaimer: At [facility], we believe that sharing information builds trust and better relationships. You are receiving this note because you are receiving care at [facility] or recently visited. It is possible you will see health information before a provider has talked with you about it. This kind of information can be easy to misunderstand. To help you fully understand what it means for your health, we urge you to discuss this note with your provider.            Andre Zheng MD  06/28/24 [id number]

## 2024-06-28 NOTE — ED NOTES
Pt arrives via EMS from home. Pt was recently admitted to this facility for UTI and AMS. Pt was admitted for almost 1 month. Per CCP notes during admission there was issues with the wife allowing pt's antibiotics to be administered. Pt was discharged 3 days ago. EMS was called yesterday because the pt slipped and fell to the floor. Wife refused transport of the pt at that time. States that she called again today for the same thing. Wife, again, wanted to refuse transport for the pt. Wife called several of the pt's doctors to try to get them to say that he did not have to come to the hospital, including the 6th floor that he was discharged from. Pt states that he was admitted here for snake bites. Pt has major swelling in his legs. Pt is confused at this time. There is several case management notes stating that the wife refused a SNF and was insistent that he return home.

## 2024-06-29 ENCOUNTER — APPOINTMENT (OUTPATIENT)
Dept: CARDIOLOGY | Facility: HOSPITAL | Age: 74
End: 2024-06-29
Payer: MEDICARE

## 2024-06-29 LAB
ANION GAP SERPL CALCULATED.3IONS-SCNC: 11.4 MMOL/L (ref 5–15)
BASOPHILS # BLD AUTO: 0.03 10*3/MM3 (ref 0–0.2)
BASOPHILS NFR BLD AUTO: 0.3 % (ref 0–1.5)
BH CV LOWER VASCULAR LEFT COMMON FEMORAL AUGMENT: NORMAL
BH CV LOWER VASCULAR LEFT COMMON FEMORAL COMPETENT: NORMAL
BH CV LOWER VASCULAR LEFT COMMON FEMORAL COMPRESS: NORMAL
BH CV LOWER VASCULAR LEFT COMMON FEMORAL PHASIC: NORMAL
BH CV LOWER VASCULAR LEFT COMMON FEMORAL SPONT: NORMAL
BH CV LOWER VASCULAR LEFT DISTAL FEMORAL COMPRESS: NORMAL
BH CV LOWER VASCULAR LEFT GASTRONEMIUS COMPRESS: NORMAL
BH CV LOWER VASCULAR LEFT GREATER SAPH AK COMPRESS: NORMAL
BH CV LOWER VASCULAR LEFT GREATER SAPH BK COMPRESS: NORMAL
BH CV LOWER VASCULAR LEFT LESSER SAPH COMPRESS: NORMAL
BH CV LOWER VASCULAR LEFT MID FEMORAL AUGMENT: NORMAL
BH CV LOWER VASCULAR LEFT MID FEMORAL COMPETENT: NORMAL
BH CV LOWER VASCULAR LEFT MID FEMORAL COMPRESS: NORMAL
BH CV LOWER VASCULAR LEFT MID FEMORAL PHASIC: NORMAL
BH CV LOWER VASCULAR LEFT MID FEMORAL SPONT: NORMAL
BH CV LOWER VASCULAR LEFT PERONEAL COMPRESS: NORMAL
BH CV LOWER VASCULAR LEFT POPLITEAL AUGMENT: NORMAL
BH CV LOWER VASCULAR LEFT POPLITEAL COMPETENT: NORMAL
BH CV LOWER VASCULAR LEFT POPLITEAL COMPRESS: NORMAL
BH CV LOWER VASCULAR LEFT POPLITEAL PHASIC: NORMAL
BH CV LOWER VASCULAR LEFT POPLITEAL SPONT: NORMAL
BH CV LOWER VASCULAR LEFT POSTERIOR TIBIAL COMPRESS: NORMAL
BH CV LOWER VASCULAR LEFT PROFUNDA FEMORAL COMPRESS: NORMAL
BH CV LOWER VASCULAR LEFT PROXIMAL FEMORAL COMPRESS: NORMAL
BH CV LOWER VASCULAR LEFT SAPHENOFEMORAL JUNCTION COMPRESS: NORMAL
BH CV LOWER VASCULAR RIGHT COMMON FEMORAL AUGMENT: NORMAL
BH CV LOWER VASCULAR RIGHT COMMON FEMORAL COMPETENT: NORMAL
BH CV LOWER VASCULAR RIGHT COMMON FEMORAL COMPRESS: NORMAL
BH CV LOWER VASCULAR RIGHT COMMON FEMORAL PHASIC: NORMAL
BH CV LOWER VASCULAR RIGHT COMMON FEMORAL SPONT: NORMAL
BH CV LOWER VASCULAR RIGHT DISTAL FEMORAL COMPRESS: NORMAL
BH CV LOWER VASCULAR RIGHT GASTRONEMIUS COMPRESS: NORMAL
BH CV LOWER VASCULAR RIGHT GREATER SAPH AK COMPRESS: NORMAL
BH CV LOWER VASCULAR RIGHT GREATER SAPH BK COMPRESS: NORMAL
BH CV LOWER VASCULAR RIGHT LESSER SAPH COMPRESS: NORMAL
BH CV LOWER VASCULAR RIGHT MID FEMORAL AUGMENT: NORMAL
BH CV LOWER VASCULAR RIGHT MID FEMORAL COMPETENT: NORMAL
BH CV LOWER VASCULAR RIGHT MID FEMORAL COMPRESS: NORMAL
BH CV LOWER VASCULAR RIGHT MID FEMORAL PHASIC: NORMAL
BH CV LOWER VASCULAR RIGHT MID FEMORAL SPONT: NORMAL
BH CV LOWER VASCULAR RIGHT PERONEAL COMPRESS: NORMAL
BH CV LOWER VASCULAR RIGHT POPLITEAL AUGMENT: NORMAL
BH CV LOWER VASCULAR RIGHT POPLITEAL COMPETENT: NORMAL
BH CV LOWER VASCULAR RIGHT POPLITEAL COMPRESS: NORMAL
BH CV LOWER VASCULAR RIGHT POPLITEAL PHASIC: NORMAL
BH CV LOWER VASCULAR RIGHT POPLITEAL SPONT: NORMAL
BH CV LOWER VASCULAR RIGHT POSTERIOR TIBIAL COMPRESS: NORMAL
BH CV LOWER VASCULAR RIGHT PROFUNDA FEMORAL COMPRESS: NORMAL
BH CV LOWER VASCULAR RIGHT PROXIMAL FEMORAL COMPRESS: NORMAL
BH CV LOWER VASCULAR RIGHT SAPHENOFEMORAL JUNCTION COMPRESS: NORMAL
BUN SERPL-MCNC: 31 MG/DL (ref 8–23)
BUN/CREAT SERPL: 15.9 (ref 7–25)
CALCIUM SPEC-SCNC: 8.4 MG/DL (ref 8.6–10.5)
CHLORIDE SERPL-SCNC: 100 MMOL/L (ref 98–107)
CO2 SERPL-SCNC: 28.6 MMOL/L (ref 22–29)
CREAT SERPL-MCNC: 1.95 MG/DL (ref 0.76–1.27)
DEPRECATED RDW RBC AUTO: 44.6 FL (ref 37–54)
EGFRCR SERPLBLD CKD-EPI 2021: 35.2 ML/MIN/1.73
EOSINOPHIL # BLD AUTO: 0.26 10*3/MM3 (ref 0–0.4)
EOSINOPHIL NFR BLD AUTO: 3 % (ref 0.3–6.2)
ERYTHROCYTE [DISTWIDTH] IN BLOOD BY AUTOMATED COUNT: 12.7 % (ref 12.3–15.4)
GLUCOSE BLDC GLUCOMTR-MCNC: 150 MG/DL (ref 70–130)
GLUCOSE BLDC GLUCOMTR-MCNC: 172 MG/DL (ref 70–130)
GLUCOSE BLDC GLUCOMTR-MCNC: 226 MG/DL (ref 70–130)
GLUCOSE BLDC GLUCOMTR-MCNC: 293 MG/DL (ref 70–130)
GLUCOSE SERPL-MCNC: 241 MG/DL (ref 65–99)
HCT VFR BLD AUTO: 32.2 % (ref 37.5–51)
HGB BLD-MCNC: 10.4 G/DL (ref 13–17.7)
IMM GRANULOCYTES # BLD AUTO: 0.03 10*3/MM3 (ref 0–0.05)
IMM GRANULOCYTES NFR BLD AUTO: 0.3 % (ref 0–0.5)
LYMPHOCYTES # BLD AUTO: 1.08 10*3/MM3 (ref 0.7–3.1)
LYMPHOCYTES NFR BLD AUTO: 12.6 % (ref 19.6–45.3)
MCH RBC QN AUTO: 31 PG (ref 26.6–33)
MCHC RBC AUTO-ENTMCNC: 32.3 G/DL (ref 31.5–35.7)
MCV RBC AUTO: 96.1 FL (ref 79–97)
MONOCYTES # BLD AUTO: 0.56 10*3/MM3 (ref 0.1–0.9)
MONOCYTES NFR BLD AUTO: 6.5 % (ref 5–12)
NEUTROPHILS NFR BLD AUTO: 6.64 10*3/MM3 (ref 1.7–7)
NEUTROPHILS NFR BLD AUTO: 77.3 % (ref 42.7–76)
NRBC BLD AUTO-RTO: 0 /100 WBC (ref 0–0.2)
PLATELET # BLD AUTO: 211 10*3/MM3 (ref 140–450)
PMV BLD AUTO: 10.3 FL (ref 6–12)
POTASSIUM SERPL-SCNC: 3.7 MMOL/L (ref 3.5–5.2)
QT INTERVAL: 470 MS
QTC INTERVAL: 500 MS
RBC # BLD AUTO: 3.35 10*6/MM3 (ref 4.14–5.8)
SODIUM SERPL-SCNC: 140 MMOL/L (ref 136–145)
WBC NRBC COR # BLD AUTO: 8.6 10*3/MM3 (ref 3.4–10.8)

## 2024-06-29 PROCEDURE — 63710000001 INSULIN LISPRO (HUMAN) PER 5 UNITS: Performed by: HOSPITALIST

## 2024-06-29 PROCEDURE — G0378 HOSPITAL OBSERVATION PER HR: HCPCS

## 2024-06-29 PROCEDURE — 82948 REAGENT STRIP/BLOOD GLUCOSE: CPT

## 2024-06-29 PROCEDURE — 36415 COLL VENOUS BLD VENIPUNCTURE: CPT | Performed by: STUDENT IN AN ORGANIZED HEALTH CARE EDUCATION/TRAINING PROGRAM

## 2024-06-29 PROCEDURE — 80048 BASIC METABOLIC PNL TOTAL CA: CPT | Performed by: STUDENT IN AN ORGANIZED HEALTH CARE EDUCATION/TRAINING PROGRAM

## 2024-06-29 PROCEDURE — 93970 EXTREMITY STUDY: CPT | Performed by: STUDENT IN AN ORGANIZED HEALTH CARE EDUCATION/TRAINING PROGRAM

## 2024-06-29 PROCEDURE — 93970 EXTREMITY STUDY: CPT

## 2024-06-29 PROCEDURE — 85025 COMPLETE CBC W/AUTO DIFF WBC: CPT | Performed by: STUDENT IN AN ORGANIZED HEALTH CARE EDUCATION/TRAINING PROGRAM

## 2024-06-29 PROCEDURE — 94799 UNLISTED PULMONARY SVC/PX: CPT

## 2024-06-29 PROCEDURE — 94664 DEMO&/EVAL PT USE INHALER: CPT

## 2024-06-29 PROCEDURE — 25010000002 CEFTRIAXONE PER 250 MG: Performed by: HOSPITALIST

## 2024-06-29 PROCEDURE — 94640 AIRWAY INHALATION TREATMENT: CPT

## 2024-06-29 RX ORDER — IBUPROFEN 600 MG/1
1 TABLET ORAL
Status: DISCONTINUED | OUTPATIENT
Start: 2024-06-29 | End: 2024-07-01 | Stop reason: HOSPADM

## 2024-06-29 RX ORDER — INSULIN LISPRO 100 [IU]/ML
2-9 INJECTION, SOLUTION INTRAVENOUS; SUBCUTANEOUS
Status: DISCONTINUED | OUTPATIENT
Start: 2024-06-29 | End: 2024-07-01 | Stop reason: HOSPADM

## 2024-06-29 RX ORDER — NICOTINE POLACRILEX 4 MG
15 LOZENGE BUCCAL
Status: DISCONTINUED | OUTPATIENT
Start: 2024-06-29 | End: 2024-07-01 | Stop reason: HOSPADM

## 2024-06-29 RX ORDER — DEXTROSE MONOHYDRATE 25 G/50ML
25 INJECTION, SOLUTION INTRAVENOUS
Status: DISCONTINUED | OUTPATIENT
Start: 2024-06-29 | End: 2024-07-01 | Stop reason: HOSPADM

## 2024-06-29 RX ORDER — IPRATROPIUM BROMIDE AND ALBUTEROL SULFATE 2.5; .5 MG/3ML; MG/3ML
3 SOLUTION RESPIRATORY (INHALATION)
Status: DISCONTINUED | OUTPATIENT
Start: 2024-06-29 | End: 2024-07-01 | Stop reason: HOSPADM

## 2024-06-29 RX ORDER — GUAIFENESIN 600 MG/1
1200 TABLET, EXTENDED RELEASE ORAL EVERY 12 HOURS SCHEDULED
Status: DISCONTINUED | OUTPATIENT
Start: 2024-06-29 | End: 2024-07-01 | Stop reason: HOSPADM

## 2024-06-29 RX ADMIN — METOPROLOL TARTRATE 12.5 MG: 25 TABLET, FILM COATED ORAL at 09:06

## 2024-06-29 RX ADMIN — Medication 10 ML: at 21:57

## 2024-06-29 RX ADMIN — BUMETANIDE 1 MG: 1 TABLET ORAL at 09:06

## 2024-06-29 RX ADMIN — DULOXETINE HYDROCHLORIDE 30 MG: 30 CAPSULE, DELAYED RELEASE ORAL at 21:56

## 2024-06-29 RX ADMIN — INSULIN LISPRO 2 UNITS: 100 INJECTION, SOLUTION INTRAVENOUS; SUBCUTANEOUS at 16:49

## 2024-06-29 RX ADMIN — AMIODARONE HYDROCHLORIDE 200 MG: 200 TABLET ORAL at 09:06

## 2024-06-29 RX ADMIN — TERAZOSIN 1 MG: 1 CAPSULE ORAL at 02:36

## 2024-06-29 RX ADMIN — METOPROLOL TARTRATE 12.5 MG: 25 TABLET, FILM COATED ORAL at 21:56

## 2024-06-29 RX ADMIN — Medication 10 ML: at 09:07

## 2024-06-29 RX ADMIN — BRIMONIDINE TARTRATE 1 DROP: 2 SOLUTION OPHTHALMIC at 09:04

## 2024-06-29 RX ADMIN — BUMETANIDE 1 MG: 1 TABLET ORAL at 21:56

## 2024-06-29 RX ADMIN — DULOXETINE HYDROCHLORIDE 30 MG: 30 CAPSULE, DELAYED RELEASE ORAL at 02:36

## 2024-06-29 RX ADMIN — GUAIFENESIN 1200 MG: 600 TABLET, EXTENDED RELEASE ORAL at 21:56

## 2024-06-29 RX ADMIN — Medication 100 MG: at 09:05

## 2024-06-29 RX ADMIN — ASPIRIN 81 MG: 81 TABLET, COATED ORAL at 21:56

## 2024-06-29 RX ADMIN — INSULIN LISPRO 6 UNITS: 100 INJECTION, SOLUTION INTRAVENOUS; SUBCUTANEOUS at 12:50

## 2024-06-29 RX ADMIN — Medication 10 ML: at 00:18

## 2024-06-29 RX ADMIN — ASPIRIN 81 MG: 81 TABLET, COATED ORAL at 00:17

## 2024-06-29 RX ADMIN — APIXABAN 5 MG: 5 TABLET, FILM COATED ORAL at 21:56

## 2024-06-29 RX ADMIN — BRIMONIDINE TARTRATE 1 DROP: 2 SOLUTION OPHTHALMIC at 21:56

## 2024-06-29 RX ADMIN — TERAZOSIN 1 MG: 1 CAPSULE ORAL at 21:56

## 2024-06-29 RX ADMIN — LATANOPROST 1 DROP: 50 SOLUTION OPHTHALMIC at 21:56

## 2024-06-29 RX ADMIN — APIXABAN 5 MG: 5 TABLET, FILM COATED ORAL at 09:06

## 2024-06-29 RX ADMIN — METOPROLOL TARTRATE 12.5 MG: 25 TABLET, FILM COATED ORAL at 00:17

## 2024-06-29 RX ADMIN — ACETAMINOPHEN 325MG 650 MG: 325 TABLET ORAL at 02:36

## 2024-06-29 RX ADMIN — IPRATROPIUM BROMIDE AND ALBUTEROL SULFATE 3 ML: .5; 3 SOLUTION RESPIRATORY (INHALATION) at 19:37

## 2024-06-29 RX ADMIN — CEFTRIAXONE SODIUM 1000 MG: 1 INJECTION, POWDER, FOR SOLUTION INTRAMUSCULAR; INTRAVENOUS at 21:57

## 2024-06-29 RX ADMIN — APIXABAN 5 MG: 5 TABLET, FILM COATED ORAL at 00:17

## 2024-06-29 NOTE — SIGNIFICANT NOTE
06/29/24 1012   OTHER   Discipline physical therapist   Rehab Time/Intention   Session Not Performed patient unavailable for evaluation  (Pt off floor for testing. Plan to follow up tomorrow for PT eval if appropriate.)   Recommendation   PT - Next Appointment 06/30/24

## 2024-06-29 NOTE — H&P
Patient Name:  Dilip Verma  YOB: 1950  MRN:  [id number]  Admit Date:  6/28/2024  Patient Care Team:  Fernando Camargo DO as PCP - General (Family Medicine)  Morris Cai MD as Consulting Physician (Sleep Medicine)  Michaela Hylton MD as Consulting Physician (Cardiology)  Hardy Banerjee MD as Consulting Physician (Ophthalmology)  Chula Christianson MD as Consulting Physician (Ophthalmology)  Jason Sherman MD (Endocrinology)  Perla Mayen MD as Consulting Physician (Nephrology)  Federico Hebert MD as Consulting Physician (Pulmonary Disease)  Niraj Ravi MD as Consulting Physician (Orthopedic Surgery)  Papa Velasco MD as Consulting Physician (Urology)  Terese Yost MD as Consulting Physician (Gastroenterology)  Aracelis Ball MD as Consulting Physician (Colon and Rectal Surgery)  [facility] as Primary Care Provider      Subjective   History Present Illness     Chief Complaint   Patient presents with   • Altered Mental Status         History of Present Illness  Mr. Verma is a 74 y.o. with past medical history of proximal atrial fibrillation, hypertension, chronic diastolic heart failure, recent admission (discharged on 6/25)  with AMENA requiring dialysis with eventual recovery of renal function no longer on dialysis, was recommended that he go to rehab at the end of his last admission however was taken home by family.  Patient  mechanical fall yesterday required EMS to come pick him up.  Had additional mechanical fall today EMS arrived and he seemed more confused so they brought him to the emergency room for further workup.  UA concerning for urinary tract infection, ceftriaxone was started.  Patient is alert and orient x 3 but confused on why he is in the hospital.  This is a has some dysuria and is peeing more than normal.    Review of Systems   Constitutional:  Negative for chills and fever.   Respiratory:  Negative for cough and  shortness of breath.    Cardiovascular:  Negative for chest pain and palpitations.   Gastrointestinal:  Negative for abdominal pain, diarrhea, nausea and vomiting.   Genitourinary:  Positive for dysuria and frequency.        Personal History     Past Medical History:   Diagnosis Date   • AMENA (acute kidney injury) 12/03/2020   • Alcoholism 1989    not since 1998   • CORI positive    • Anemia    • Anxiety    • Ataxia    • Atypical chest pain 04/19/2022    SEEN AT [facility] ER   • Balance disorder    • Carotid stenosis 03/28/2024   • Cataract     BILATERAL, S/P EXTRACTION   • Cervical radiculopathy 11/11/2019    SEEN AT  [facility] ER   • Cervical spinal cord compression    • Chronic diastolic (congestive) heart failure    • Chronic kidney disease     STAGE 3, FOLLOWED BY DR. VIVAR   • Chronic pancreatitis    • Closed left subtrochanteric femur fracture 12/01/2020    ADMITTED TO [facility]   • Closed nondisplaced intertrochanteric fracture of left femur 12/01/2020    ADMITTED TO [facility]   • Colon polyps     FOLLOWED BY DR. AVTAR JEONG   • Constipation    • Contracture, right hand    • Coronary artery disease     CABG 7/2019   • COVID-19 07/2022   • DDD (degenerative disc disease), cervical    • Diabetes mellitus, type II     IDDM   • Diabetic retinopathy    • Difficulty walking    • Dysphagia    • Elevated brain natriuretic peptide (BNP) level 08/2014   • Elevated LFTs 03/2021   • Erectile dysfunction    • Fissure, anal 2022   • Foot drop    • Fuchs' corneal dystrophy of right eye    • Glaucoma     BILATERAL   • History of alcohol abuse    • Hyperlipidemia    • Hypersomnia    • Hypertension    • Hypertensive urgency 02/25/2020    ADMITTED TO [facility]   • Insomnia    • Kidney stones    • LV dysfunction 06/2016   • Lyme disease    • Lymphadenopathy syndrome 05/2021   • Macular edema     BILATERAL   • Myocardial infarction 11/05/2019    NSTEMI, ADMITTED TO [facility]   • Myocardial infarction 07/12/2019    NSTEMI, ADMITTED TO [facility]   • Neuropathy in diabetes  "   • Non-celiac gluten sensitivity    • Orthostasis    • Osteoporosis    • Oxygen dependent    • PAD (peripheral artery disease)    • Paroxysmal atrial fibrillation 06/06/2024   • PNA (pneumonia) 06/2016    LEFT LOBE   • Polyneuropathy    • PTSD (post-traumatic stress disorder)    • Pulmonary hypertension    • Pulmonary nodule    • Senile ectropion of both lower eyelids 02/2022   • Sepsis 12/16/2020    D/T UTI, ADMITTED TO [facility]   • Sleep apnea     STATES DOESN'T USE BIPAP OR CPAP   • Spinal stenosis in cervical region     SEVERE-LIMITED ROM   • Stroke 2012    \"slight stroke\"   • Syncope and collapse 07/06/2019    ADMITTED TO [place]   • Urinary retention 04/05/2021    SEEN AT [facility] ER   • UTI (urinary tract infection)    • Vision loss    • Vitamin D deficiency      Past Surgical History:   Procedure Laterality Date   • CARDIAC CATHETERIZATION N/A 07/15/2019    Procedure: Coronary angiography;  Surgeon: Carrie Price MD;  Location:  [facility];  Service: Cardiovascular   • CARDIAC CATHETERIZATION N/A 07/15/2019    Procedure: Left Heart Cath;  Surgeon: Carrie Price MD;  Location:  [facility];  Service: Cardiovascular   • CARDIAC CATHETERIZATION N/A 07/15/2019    Procedure: Left ventriculography;  Surgeon: Carrie Price MD;  Location:  [facility];  Service: Cardiovascular   • CARDIAC CATHETERIZATION  07/15/2019    Procedure: Functional Flow [place];  Surgeon: Carrie Price MD;  Location:  [facility];  Service: Cardiovascular   • CARDIAC CATHETERIZATION N/A 11/06/2019    Procedure: Right and Left Heart Cath;  Surgeon: Mya Smith MD;  Location:  [facility];  Service: Cardiovascular   • CARDIAC CATHETERIZATION N/A 11/06/2019    Procedure: Coronary angiography;  Surgeon: Mya Smith MD;  Location: [facility] CATH INVASIVE LOCATION;  Service: Cardiovascular   • CARDIAC SURGERY     • CATARACT EXTRACTION Left 2014   • " CATARACT EXTRACTION Right 11/2016    PHACO/IOL, DR. LEN DENTON   • COLONOSCOPY N/A 03/16/2023    ENTIRE COLON WNL, RESCOPE IN 5 YRS, DR. LINDA LIZARRAGA AT [facility]   • COLONOSCOPY W/ POLYPECTOMY N/A 01/02/2015    A FEW DIVERTICULA IN SIGMOID, 6 MM TUBULOVILLOUS ADENOMA POLYP IN RECTUM, SMALL HEMORRHOIDS, MELANOSIS COLI, DR. AVTAR JEONG AT [facility]   • CORONARY ARTERY BYPASS GRAFT N/A 07/18/2019    Procedure: INTRAOPERATIVE SHOAIB; STERNOTOMY CORONARY ARTERY BYPASS x 3  USING LEFT INTERNAL MAMMARY ARTERY GRAFT UTILIZING ENDOSCOPICALLY HARVESTED RIGHT GREATER SAPHENOUS VEIN AND PRP.;  Surgeon: Bill Devi MD;  Location: [facility] MAIN OR;  Service: Cardiothoracic   • CYSTOSCOPY BLADDER STONE LITHOTRIPSY N/A    • DENTAL PROCEDURE Bilateral     3 surgeries inder implants   • FEMUR IM NAILING/RODDING Left 12/03/2020    Procedure: LEFT HIP INTRAMEDULLARY NAIL;  Surgeon: Niraj Ravi MD;  Location: [facility] MAIN OR;  Service: Orthopedic Spine;  Laterality: Left;   • INCISION AND DRAINAGE PERIRECTAL ABSCESS N/A 10/04/2023    Procedure: Incision and drainage of perianal and buttock abscess;  Surgeon: Herbie Villatoro MD;  Location: [facility] MAIN OR;  Service: General;  Laterality: N/A;   • INGUINAL HERNIA REPAIR Bilateral    • TOENAIL EXCISION  06/2022   • TONSILLECTOMY Bilateral    • TOTAL HIP ARTHROPLASTY REVISION Left 01/11/2022    Procedure: TOTAL HIP ARTHROPLASTY REVISION- POSTERIOR;  Surgeon: Jurgen Candelaria II, MD;  Location: [facility] MAIN OR;  Service: Orthopedics;  Laterality: Left;   • VASECTOMY N/A      Family History   Problem Relation Age of Onset   • Heart disease Mother    • Hypertension Mother    • Hyperlipidemia Mother    • Depression Mother    • Cancer Mother         uterine   • Arrhythmia Mother    • Uterine cancer Mother    • Mental illness Mother    • Migraines Mother    • Hyperlipidemia Father    • Heart disease Father    • Hypertension Father    • Kidney disease Father    •  Thyroid disease Father    • Heart failure Father    • Depression Sister    • Alcohol abuse Brother    • Thyroid disease Paternal Uncle    • Lung disease Paternal Uncle    • Stroke Maternal Grandmother    • Glaucoma Maternal Grandmother    • Heart attack Paternal Grandmother    • Stroke Paternal Grandmother    • Alcohol abuse Paternal Grandfather    • Malig Hyperthermia Neg Hx      Social History     Tobacco Use   • Smoking status: Former     Current packs/day: 0.00     Average packs/day: 0.8 packs/day for 16.0 years (12.0 ttl pk-yrs)     Types: Cigarettes     Start date: 1984     Quit date: 2000     Years since quittin.5     Passive exposure: Past   • Smokeless tobacco: Never   • Tobacco comments:     Start age:39/Stopping age:47, 3/4 PPD   Vaping Use   • Vaping status: Never Used   Substance Use Topics   • Alcohol use: Not Currently     Comment: none since    • Drug use: No     No current facility-administered medications on file prior to encounter.     Current Outpatient Medications on File Prior to Encounter   Medication Sig Dispense Refill   • amiodarone (PACERONE) 200 MG tablet Take 1 tablet by mouth Daily. 30 tablet 3   • apixaban (ELIQUIS) 5 MG tablet tablet Take 1 tablet by mouth Every 12 (Twelve) Hours. Indications: Atrial Fibrillation 60 tablet 5   • aspirin 81 MG EC tablet Take 1 tablet by mouth Every Night.     • brimonidine (ALPHAGAN) 0.2 % ophthalmic solution Administer 1 drop to both eyes 2 (Two) Times a Day.     • bumetanide (BUMEX) 1 MG tablet Take 1 tablet by mouth 2 (Two) Times a Day. 180 tablet 1   • Cholecalciferol (Vitamin D-3) 125 MCG (5000 UT) tablet Take 1 tablet by mouth Daily.     • DULoxetine (CYMBALTA) 30 MG capsule Take 1 capsule by mouth Every Night.     • hydrocortisone 1 % cream Apply 1 Application topically to the appropriate area as directed 2 (Two) Times a Day. 28 g 0   • Insulin Glargine, 2 Unit Dial, (TOUJEO) 300 UNIT/ML solution pen-injector injection Inject 10  Units under the skin into the appropriate area as directed Daily.     • insulin lispro (humaLOG) 100 UNIT/ML injection Inject 0-14 Units under the skin into the appropriate area as directed 3 (Three) Times a Day Before Meals. (Patient taking differently: Inject 0-14 Units under the skin into the appropriate area as directed 3 (Three) Times a Day Before Meals. SLIDING SCALE  100-150 - 4 units  151-200 - 5 units  201-250 - 6 units  251-300 - 7 units  301-350 - 8 units  351-400 - 9 units  401+ - 10 units)  12   • latanoprost (XALATAN) 0.005 % ophthalmic solution Administer 1 drop to both eyes every night at bedtime.     • Magnesium 400 MG capsule Take 400 mg by mouth As Needed.     • metoprolol tartrate (LOPRESSOR) 25 MG tablet Take 0.5 tablets by mouth Every 12 (Twelve) Hours. 90 tablet 1   • multivitamin with minerals (MULTIVITAMIN ADULTS PO) Take 1 tablet by mouth Daily.     • sildenafil (REVATIO) 20 MG tablet Take 1 tablet by mouth As Needed.     • terazosin (HYTRIN) 1 MG capsule Take 1 capsule by mouth Every Night. 30 capsule 0   • testosterone (ANDROGEL) 25 MG/2.5GM (1%) gel gel Place 25 mg on the skin as directed by provider 2 (Two) Times a Week.     • thiamine (VITAMIN B1) 100 MG tablet Take 1 tablet by mouth Daily. 30 tablet 0   • traMADol (ULTRAM) 50 MG tablet Take 1 tablet by mouth At Night As Needed.       Allergies   Allergen Reactions   • Ace Inhibitors Angioedema   • Angiotensin Receptor Blockers Angioedema and Unknown (See Comments)     Angioneurotic edema   • Dapagliflozin Other (See Comments)     UTI,  rectal abcess   • Losartan Angioedema     Angioneurotic edema   • Seroquel [Quetiapine] Hallucinations   • Xanax [Alprazolam] Unknown - High Severity   • Amlodipine Swelling   • Ativan [Lorazepam] Hallucinations   • Baclofen Hallucinations   • Misc. Sulfonamide Containing Compounds Unknown (See Comments)   • Minoxidil Confusion   • Tetracycline Rash     bliisters in mouth         Objective    Objective      Vital Signs  Temp:  [97.2 °F (36.2 °C)] 97.2 °F (36.2 °C)  Heart Rate:  [62-73] 73  Resp:  [18] 18  BP: (184-200)/() 200/100  SpO2:  [96 %-98 %] 96 %  on  Flow (L/min):  [3] 3;   Device (Oxygen Therapy): nasal cannula  There is no height or weight on file to calculate BMI.    Physical Exam  Vitals and nursing note reviewed.   Constitutional:       General: He is not in acute distress.  Cardiovascular:      Rate and Rhythm: Normal rate and regular rhythm.   Pulmonary:      Effort: Pulmonary effort is normal. No respiratory distress.   Abdominal:      General: Abdomen is flat. There is no distension.      Tenderness: There is no abdominal tenderness.   Musculoskeletal:         General: No swelling or deformity.      Comments: 1+ pitting bilateral edema  Left lower extremity venous stasis dermatitis   Skin:     General: Skin is warm and dry.   Neurological:      General: No focal deficit present.      Mental Status: He is alert. Mental status is at baseline.         Results Review:  I reviewed the patient's new clinical results.  I reviewed the patient's new imaging results and agree with the interpretation.  I reviewed the patient's other test results and agree with the interpretation  I personally viewed and interpreted the patient's EKG/Telemetry data  Discussed with ED provider.    Lab Results (last 24 hours)       Procedure Component Value Units Date/Time    Urinalysis With Microscopic If Indicated (No Culture) - Urine, Clean Catch [[id number]]  (Abnormal) Collected: 06/28/24 1903    Specimen: Urine, Clean Catch Updated: 06/28/24 1921     Color, UA Yellow     Appearance, UA Clear     pH, UA 5.5     Specific Gravity, UA 1.009     Glucose, UA Negative     Ketones, UA Negative     Bilirubin, UA Negative     Blood, UA Negative     Protein, UA Negative     Leuk Esterase, UA Moderate (2+)     Nitrite, UA Negative     Urobilinogen, UA 0.2 E.U./dL    Urinalysis, Microscopic Only - Urine, Clean Catch [[id number]]   (Abnormal) Collected: 06/28/24 1903    Specimen: Urine, Clean Catch Updated: 06/28/24 1922     RBC, UA 0-2 /HPF      WBC, UA 21-50 /HPF      Bacteria, UA None Seen /HPF      Squamous Epithelial Cells, UA 0-2 /HPF      Hyaline Casts, UA 0-2 /LPF      Methodology Automated Microscopy    Urine Culture - Urine, Urine, Clean Catch [[id number]] Collected: 06/28/24 1903    Specimen: Urine, Clean Catch Updated: 06/28/24 2109    CBC & Differential [[id number]]  (Abnormal) Collected: 06/28/24 1914    Specimen: Blood Updated: 06/28/24 1932    Narrative:      The following orders were created for panel order CBC & Differential.  Procedure                               Abnormality         Status                     ---------                               -----------         ------                     CBC Auto Differential[[id number]]        Abnormal            Final result                 Please view results for these tests on the individual orders.    Comprehensive Metabolic Panel [[id number]]  (Abnormal) Collected: 06/28/24 1914    Specimen: Blood Updated: 06/28/24 1953     Glucose 170 mg/dL      BUN 34 mg/dL      Creatinine 2.16 mg/dL      Sodium 140 mmol/L      Potassium 3.8 mmol/L      Comment: Slight hemolysis detected by analyzer. Result may be falsely elevated.        Chloride 102 mmol/L      CO2 28.0 mmol/L      Calcium 8.3 mg/dL      Total Protein 6.8 g/dL      Albumin 3.4 g/dL      ALT (SGPT) 23 U/L      AST (SGOT) 18 U/L      Alkaline Phosphatase 92 U/L      Total Bilirubin 0.5 mg/dL      Globulin 3.4 gm/dL      A/G Ratio 1.0 g/dL      BUN/Creatinine Ratio 15.7     Anion Gap 10.0 mmol/L      eGFR 31.2 mL/min/1.73     Narrative:      GFR Normal >60  Chronic Kidney Disease <60  Kidney Failure <15    The GFR formula is only valid for adults with stable renal function between ages 18 and 70.    CBC Auto Differential [[id number]]  (Abnormal) Collected: 06/28/24 1914    Specimen: Blood Updated: 06/28/24 1932     WBC 9.04  10*3/mm3      RBC 3.51 10*6/mm3      Hemoglobin 11.1 g/dL      Hematocrit 33.7 %      MCV 96.0 fL      MCH 31.6 pg      MCHC 32.9 g/dL      RDW 13.0 %      RDW-SD 44.8 fl      MPV 10.3 fL      Platelets 243 10*3/mm3      Neutrophil % 78.9 %      Lymphocyte % 10.4 %      Monocyte % 7.1 %      Eosinophil % 2.8 %      Basophil % 0.6 %      Immature Grans % 0.2 %      Neutrophils, Absolute 7.14 10*3/mm3      Lymphocytes, Absolute 0.94 10*3/mm3      Monocytes, Absolute 0.64 10*3/mm3      Eosinophils, Absolute 0.25 10*3/mm3      Basophils, Absolute 0.05 10*3/mm3      Immature Grans, Absolute 0.02 10*3/mm3      nRBC 0.0 /100 WBC     Blood Culture - Blood, Hand, Left [[id number]] Collected: 06/28/24 2139    Specimen: Blood from Hand, Left Updated: 06/28/24 2147    Blood Culture - Blood, Hand, Left [[id number]] Collected: 06/28/24 2139    Specimen: Blood from Hand, Left Updated: 06/28/24 2148    Lactic Acid, Plasma [[id number]]  (Normal) Collected: 06/28/24 2150    Specimen: Blood Updated: 06/28/24 2212     Lactate 1.0 mmol/L             Imaging Results (Last 24 Hours)       Procedure Component Value Units Date/Time    CT Head Without Contrast [[id number]] Collected: 06/28/24 2012     Updated: 06/28/24 2021    Narrative:      CT HEAD WITHOUT CONTRAST     CLINICAL HISTORY: ams. Multiple falls.     TECHNIQUE: CT scan of the head was obtained with 3 mm axial soft tissue  and 2 mm axial bone algorithm algorithm images. No intravenous contrast  was administered. Sagittal and coronal reconstructions were obtained.     COMPARISON: No previous similar studies are available for comparison.     FINDINGS:       There is no evidence for a calvarial fracture or an acute extra-axial  hemorrhage.     Mild changes of chronic small vessel ischemic phenomena are noted. There  is a lacunar disease within the left thalamus. The ventricles, sulci,  and cisterns are age-appropriate. The gray-white matter differentiation  is within normal limits. The  posterior fossa structures are within  normal limits.       Impression:         No evidence for acute traumatic intracranial pathology.           Radiation dose reduction techniques were utilized, including automated  exposure control and exposure modulation based on body size.     This report was finalized on 6/28/2024 8:18 PM by Dr. Dario Yeboah M.D  on Workstation: [id number]       XR Chest 1 View [[id number]] Collected: 06/28/24 1953     Updated: 06/28/24 2001    Narrative:      XR CHEST 1 VW-     HISTORY: Male who is 74 years-old, short of breath     TECHNIQUE: Frontal view of the chest     COMPARISON: 6/21/2024     FINDINGS: The heart size is normal. Sternotomy wires are present. Aorta  is calcified. Pulmonary vasculature is mildly congested. A previous  right IJ catheter is no longer seen. Likely atelectasis in the left mid  to lower lung. No large pleural effusion. No pneumothorax. No acute  osseous process.       Impression:      Likely atelectasis in the left mid to lower lung. Mild  vascular congestion. Follow-up as clinical indications persist.     This report was finalized on 6/28/2024 7:58 PM by Dr. Gabe Zimmer M.D on Workstation: [id number]               Results for orders placed during the hospital encounter of 05/30/24    Adult Transthoracic Echo Complete W/ Cont if Necessary Per Protocol    Interpretation Summary  •  Left ventricular systolic function is hyperdynamic (EF > 70%). Calculated left ventricular EF = 75% Global longitudinal LV strain (GLS) = -17.8%. Left ventricle strain data was reviewed by the physician and found to be accurate. Global longitudinal LV strain is normal however there is regional abnormality with apical sparing suggestive of amyloid heart disease. However there is also basal ptal hypertrophy suggestive of hypertrophic cardiomyopathy. Wall motion abnormality is also noted in the basal septum and cannot rule out ischemic component.Consider additional imaging such  as cardiac MRI and further evaluation also for amyloid heart disease as clinically indicated. The left ventricular cavity is small in size. Left ventricular wall thickness is consistent with moderate to severe septal asymmetric hypertrophy. There is hypokinesis of the left ventricular basal septum. Left ventricular diastolic function is consistent with (grade II w/high LAP) pseudonormalization.  •  The left atrial cavity is mildly dilated.  •  No aortic valve regurgitation or stenosis is present. The aortic valve is abnormal in structure. There is mild thickening of the aortic valve.  •  There is mild, bileaflet mitral valve thickening present. Trace mitral valve regurgitation is present. No significant mitral valve stenosis is present.  •  Moderate tricuspid valve regurgitation is present. Estimated right ventricular systolic pressure from tricuspid regurgitation is markedly elevated (>55 mmHg). Calculated right ventricular systolic pressure from tricuspid regurgitation is 74 mmHg.      ECG 12 Lead Dyspnea   Preliminary Result   HEART RATE=68  bpm   RR Jeqdqfxq=661  ms   NE Uupzglcz=538  ms   P Horizontal Axis=-11  deg   P Front Axis=41  deg   QRSD Rtnbqzae=138  ms   QT Cdekrjqu=642  ms   PHeW=981  ms   QRS Axis=101  deg   T Wave Axis=252  deg   - ABNORMAL ECG -   Sinus rhythm   Probable left atrial enlargement   Nonspecific intraventricular conduction delay   Nonspecific repol abnormality, diffuse leads   Date and Time of Study:2024-06-28 19:01:45           Assessment/Plan     Active Hospital Problems    Diagnosis  POA   • **Acute UTI (urinary tract infection) [N39.0]  Yes   • AMS (altered mental status) [R41.82]  Yes   • Paroxysmal atrial fibrillation [I48.0]  Yes   • Metabolic encephalopathy [G93.41]  Yes   • Peripheral arterial disease [I73.9]  Yes   • Overweight [E66.3]  Yes   • CKD (chronic kidney disease) stage 3, GFR 30-59 ml/min [N18.30]  Yes   • HTN (hypertension) [I10]  Yes   • Chronic diastolic heart  failure [I50.32]  Yes   • Hx of CABG [Z95.1]  Not Applicable   • CAD (coronary artery disease) [I25.10]  Yes      Resolved Hospital Problems   No resolved problems to display.     Urinary tract infection  -UA with 21-50 WBCs, moderate leukocyte esterase  -Previous urine cultures reviewed, continue ceftriaxone  -Blood cultures pending    Encephalopathy  -Likely from either UTI or tramadol use.  No focal deficits.  And now alert and orient x 3.  Discontinue tramadol. It was restarted a few days prior to last dc  -Delirium last admission requiring scheduled Zyprexa that eventually resolved and Zyprexa was not needed on discharge    Bilateral lower extremity swelling  Left leg venous stasis dermatitis  -Continue home Bumex.  -Check bilateral duplex    CKD  -Recent AMENA requiring HD but HD line was removed and dialysis discontinued prior to discharge  -Creatinine downtrending from discharge    Hypertension, uncontrolled  Coronary artery disease  Paroxysmal atrial fibrillation  Chronic diastolic heart failure  Peripheral artery disease  -Continue Eliquis, amiodarone, aspirin, metoprolol, Bumex  -IV labetalol as needed, unsure of medicine compliance.      BPH-continue home terazosin    Type 2 DM, A1c 8.9%  -Lantus 6 units and sliding scale insulin     Chronic hypoxic respiratory failure  -Discharged on 3 L of oxygen continuously    Dispo-family declined SNF last admission.  Will have PT/OT evaluate again.    I discussed the patient's findings and my recommendations with patient, nursing staff, and ED provider.    VTE Prophylaxis - Eliquis (home med).  Code Status - Full code.  Reviewed palliative care note from last admission that confirms full CODE STATUS.       Murphy Hdz MD  [facility]  06/28/24  22:17 EDT

## 2024-06-29 NOTE — ED NOTES
Nursing report ED to floor  Dilip Verma  74 y.o.  male    HPI :  HPI (Adult)  Stated Reason for Visit: AMS    Chief Complaint  Chief Complaint   Patient presents with    Altered Mental Status       Admitting doctor:   Murphy Hdz MD    Admitting diagnosis:   The primary encounter diagnosis was Altered mental status, unspecified altered mental status type. Diagnoses of Acute UTI and Multiple falls were also pertinent to this visit.    Code status:   Current Code Status       Date Active Code Status Order ID Comments User Context       Prior            Allergies:   Ace inhibitors, Angiotensin receptor blockers, Dapagliflozin, Losartan, Seroquel [quetiapine], Xanax [alprazolam], Amlodipine, Ativan [lorazepam], Baclofen, Misc. sulfonamide containing compounds, Minoxidil, and Tetracycline    Isolation:   No active isolations    Intake and Output  No intake or output data in the 24 hours ending 06/28/24 2130    Weight:   There were no vitals filed for this visit.    Most recent vitals:   Vitals:    06/28/24 1827 06/28/24 1916   BP: (!) 195/80 (!) 184/88   Patient Position: Lying    Pulse: 62 68   Resp: 18    Temp: 97.2 °F (36.2 °C)    TempSrc: Oral    SpO2: 98% 98%       Active LDAs/IV Access:   Lines, Drains & Airways       Active LDAs       Name Placement date Placement time Site Days    Peripheral IV 06/28/24 1817 Right Hand 06/28/24  1817  Hand  less than 1                    Labs (abnormal labs have a star):   Labs Reviewed   COMPREHENSIVE METABOLIC PANEL - Abnormal; Notable for the following components:       Result Value    Glucose 170 (*)     BUN 34 (*)     Creatinine 2.16 (*)     Calcium 8.3 (*)     Albumin 3.4 (*)     eGFR 31.2 (*)     All other components within normal limits    Narrative:     GFR Normal >60  Chronic Kidney Disease <60  Kidney Failure <15    The GFR formula is only valid for adults with stable renal function between ages 18 and 70.   URINALYSIS W/ MICROSCOPIC IF INDICATED (NO CULTURE) -  Abnormal; Notable for the following components:    Leuk Esterase, UA Moderate (2+) (*)     All other components within normal limits   CBC WITH AUTO DIFFERENTIAL - Abnormal; Notable for the following components:    RBC 3.51 (*)     Hemoglobin 11.1 (*)     Hematocrit 33.7 (*)     Neutrophil % 78.9 (*)     Lymphocyte % 10.4 (*)     Neutrophils, Absolute 7.14 (*)     All other components within normal limits   URINALYSIS, MICROSCOPIC ONLY - Abnormal; Notable for the following components:    WBC, UA 21-50 (*)     All other components within normal limits   BLOOD CULTURE   BLOOD CULTURE   URINE CULTURE   LACTIC ACID, PLASMA   CBC AND DIFFERENTIAL    Narrative:     The following orders were created for panel order CBC & Differential.  Procedure                               Abnormality         Status                     ---------                               -----------         ------                     CBC Auto Differential[[id number]]        Abnormal            Final result                 Please view results for these tests on the individual orders.       EKG:   ECG 12 Lead Dyspnea   Preliminary Result   HEART RATE=68  bpm   RR Nkmplpix=668  ms   TN Ehtuumzw=669  ms   P Horizontal Axis=-11  deg   P Front Axis=41  deg   QRSD Iyqiyjwa=630  ms   QT Frbjfixw=479  ms   COmE=052  ms   QRS Axis=101  deg   T Wave Axis=252  deg   - ABNORMAL ECG -   Sinus rhythm   Probable left atrial enlargement   Nonspecific intraventricular conduction delay   Nonspecific repol abnormality, diffuse leads   Date and Time of Study:2024-06-28 19:01:45          Meds given in ED:   Medications   sodium chloride 0.9 % flush 10 mL (has no administration in time range)   cefTRIAXone (ROCEPHIN) 1,000 mg in sodium chloride 0.9 % 100 mL MBP (has no administration in time range)       Imaging results:  CT Head Without Contrast    Result Date: 6/28/2024   No evidence for acute traumatic intracranial pathology.    Radiation dose reduction techniques were  utilized, including automated exposure control and exposure modulation based on body size.  This report was finalized on 2024 8:18 PM by Dr. Dario Yeboah M.D on Workstation: [id number]      XR Chest 1 View    Result Date: 2024  Likely atelectasis in the left mid to lower lung. Mild vascular congestion. Follow-up as clinical indications persist.  This report was finalized on 2024 7:58 PM by Dr. Gabe Zimmer M.D on Workstation: [id number]       Ambulatory status:   - assist x 1    Social issues:   Social History     Socioeconomic History    Marital status:    Tobacco Use    Smoking status: Former     Current packs/day: 0.00     Average packs/day: 0.8 packs/day for 16.0 years (12.0 ttl pk-yrs)     Types: Cigarettes     Start date: 1984     Quit date: 2000     Years since quittin.5     Passive exposure: Past    Smokeless tobacco: Never    Tobacco comments:     Start age:39/Stopping age:47, 3/4 PPD   Vaping Use    Vaping status: Never Used   Substance and Sexual Activity    Alcohol use: Not Currently     Comment: none since     Drug use: No    Sexual activity: Not Currently     Birth control/protection: Vasectomy       Peripheral Neurovascular  Peripheral Neurovascular (Adult)  Peripheral Neurovascular WDL: WDL    Neuro Cognitive  Neuro Cognitive (Adult)  Cognitive/Neuro/Behavioral WDL: WDL, orientation  Orientation: oriented x 4    Learning  Learning Assessment (Adult)  Learning Readiness and Ability: no barriers identified    Respiratory  Respiratory WDL  Respiratory WDL: WDL    Abdominal Pain       Pain Assessments  Pain (Adult)  (0-10) Pain Rating: Rest: 0  (0-10) Pain Rating: Activity: 0    NIH Stroke Scale       Omid Winter RN  24 21:30 EDT

## 2024-06-29 NOTE — PROGRESS NOTES
Name: Dilip Verma ADMIT: 2024   : 1950  PCP: Fernando Camargo DO    MRN: [id number] LOS: 0 days   AGE/SEX: 74 y.o. male  ROOM: [facility]     Subjective   Subjective   Patient sleepy today.  Did not get much sleep last night.  Awakens easily.       Objective   Objective   Vital Signs  Temp:  [97.2 °F (36.2 °C)-98.8 °F (37.1 °C)] 97.5 °F (36.4 °C)  Heart Rate:  [54-73] 54  Resp:  [18] 18  BP: (114-200)/() 114/52  SpO2:  [87 %-98 %] 91 %  on  Flow (L/min):  [3] 3;   Device (Oxygen Therapy): nasal cannula  Body mass index is 28.86 kg/m².  Physical Exam  Vitals and nursing note reviewed.   Constitutional:       General: He is not in acute distress.  Cardiovascular:      Rate and Rhythm: Normal rate and regular rhythm.   Pulmonary:      Effort: Pulmonary effort is normal.      Breath sounds: Normal breath sounds.   Abdominal:      General: Bowel sounds are normal.      Palpations: Abdomen is soft.      Tenderness: There is no abdominal tenderness.   Musculoskeletal:         General: No swelling.   Skin:     General: Skin is warm and dry.   Neurological:      Mental Status: He is alert. Mental status is at baseline.   Psychiatric:         Attention and Perception: He is inattentive.       Results Review     I reviewed the patient's new clinical results.  Results from last 7 days   Lab Units 24  03224  0737   WBC 10*3/mm3 8.60 9.04 6.69   HEMOGLOBIN g/dL 10.4* 11.1* 10.0*   PLATELETS 10*3/mm3 211 243 207     Results from last 7 days   Lab Units 24  03224  0737 24  0640   SODIUM mmol/L 140 140 141 138   POTASSIUM mmol/L 3.7 3.8 4.1 4.5   CHLORIDE mmol/L 100 102 104 101   CO2 mmol/L 28.6 28.0 31.0* 29.1*   BUN mg/dL 31* 34* 42* 42*   CREATININE mg/dL 1.95* 2.16* 2.63* 3.11*   GLUCOSE mg/dL 241* 170* 181* 177*   EGFR mL/min/1.73 35.2* 31.2* 24.6* 20.1*     Results from last 7 days   Lab Units 24  1914   ALBUMIN g/dL 3.4*   BILIRUBIN  mg/dL 0.5   ALK PHOS U/L 92   AST (SGOT) U/L 18   ALT (SGPT) U/L 23     Results from last 7 days   Lab Units 06/29/24  0321 06/28/24  1914 06/25/24  0737 06/24/24  0640   CALCIUM mg/dL 8.4* 8.3* 8.2* 8.7   ALBUMIN g/dL  --  3.4*  --   --      Results from last 7 days   Lab Units 06/28/24  2150   LACTATE mmol/L 1.0     Glucose   Date/Time Value Ref Range Status   06/29/2024 1210 293 (H) 70 - 130 mg/dL Final   06/29/2024 0629 226 (H) 70 - 130 mg/dL Final   06/28/2024 2343 182 (H) 70 - 130 mg/dL Final       CT Head Without Contrast    Result Date: 6/28/2024   No evidence for acute traumatic intracranial pathology.    Radiation dose reduction techniques were utilized, including automated exposure control and exposure modulation based on body size.  This report was finalized on 6/28/2024 8:18 PM by Dr. Dario Yeboah M.D on Workstation: [id number]      XR Chest 1 View    Result Date: 6/28/2024  Likely atelectasis in the left mid to lower lung. Mild vascular congestion. Follow-up as clinical indications persist.  This report was finalized on 6/28/2024 7:58 PM by Dr. Gabe Zimmer M.D on Workstation: [id number]       I have personally reviewed all medications:  Scheduled Medications  amiodarone, 200 mg, Oral, Q24H  apixaban, 5 mg, Oral, Q12H  aspirin, 81 mg, Oral, Nightly  brimonidine, 1 drop, Both Eyes, BID  bumetanide, 1 mg, Oral, BID  cefTRIAXone, 1,000 mg, Intravenous, Q24H  DULoxetine, 30 mg, Oral, Nightly  guaiFENesin, 1,200 mg, Oral, Q12H  insulin glargine, 6 Units, Subcutaneous, Nightly  insulin lispro, 2-9 Units, Subcutaneous, 4x Daily AC & at Bedtime  ipratropium-albuterol, 3 mL, Nebulization, Q4H While Awake - RT  latanoprost, 1 drop, Both Eyes, Nightly  metoprolol tartrate, 12.5 mg, Oral, Q12H  sodium chloride, 10 mL, Intravenous, Q12H  terazosin, 1 mg, Oral, Nightly  thiamine, 100 mg, Oral, Daily    Infusions   Diet  Diet: Cardiac, Diabetic; Healthy Heart (2-3 Na+); Consistent Carbohydrate; Fluid  Consistency: Thin (IDDSI 0)    I have personally reviewed:  [x]  Laboratory   [x]  Microbiology   [x]  Radiology   [x]  EKG/Telemetry  [x]  Cardiology/Vascular   []  Pathology    []  Records       Assessment/Plan     Active Hospital Problems    Diagnosis  POA    **Acute UTI (urinary tract infection) [N39.0]  Yes    Paroxysmal atrial fibrillation [I48.0]  Yes    Metabolic encephalopathy [G93.41]  Yes    Peripheral arterial disease [I73.9]  Yes    CKD (chronic kidney disease) stage 3, GFR 30-59 ml/min [N18.30]  Yes    HTN (hypertension) [I10]  Yes    Chronic diastolic heart failure [I50.32]  Yes    CAD (coronary artery disease) [I25.10]  Yes      Resolved Hospital Problems   No resolved problems to display.       74 y.o. male admitted with Acute UTI (urinary tract infection).    BP last evening was 200/100 down all the way to 122/57 this morning.  Creatinine slightly improved.  Blood sugars quite elevated.  No current orders for SSI.  Will order moderate dose algorithm.  Check A1c.    Admitted for UTI with probable metabolic encephalopathy.  Received Rocephin in the ED but no additional antibiotics ordered.  Will continue Rocephin for now.  Wife is requesting to see Dr. Nagel with infectious disease.  Note severe encephalopathy associated with cefepime last admission.    He has atelectasis on his chest x-ray and needs incentive spirometry.  Will schedule bronchodilators and add Mucinex.  Try to wean oxygen but he was on this continuously at home.    PT needs to work with him and increase activity.  He is having frequent falls at home.    CCP to follow-up Monday       Eliquis (home med) for DVT prophylaxis.  Full code.  Discussed with patient and nursing staff.  Anticipate home with home health versus SNU in 1-2 days.  Expected Discharge Date: 7/1/2024; Expected Discharge Time:       Lasha De La Rosa MD  [facility]  06/29/24  15:37 EDT

## 2024-06-29 NOTE — OUTREACH NOTE
Medical Week 1 Survey      Flowsheet Row Responses   [facility] patient discharged from? [place]   Does the patient have one of the following disease processes/diagnoses(primary or secondary)? Other   Week 1 attempt successful? No   Unsuccessful attempts Attempt 1   Revoke Readmitted            Leena REESE - Registered Nurse

## 2024-06-30 LAB
ANION GAP SERPL CALCULATED.3IONS-SCNC: 7 MMOL/L (ref 5–15)
BUN SERPL-MCNC: 36 MG/DL (ref 8–23)
BUN/CREAT SERPL: 15.7 (ref 7–25)
CALCIUM SPEC-SCNC: 8.7 MG/DL (ref 8.6–10.5)
CHLORIDE SERPL-SCNC: 100 MMOL/L (ref 98–107)
CO2 SERPL-SCNC: 33 MMOL/L (ref 22–29)
CREAT SERPL-MCNC: 2.29 MG/DL (ref 0.76–1.27)
CREAT UR-MCNC: 76 MG/DL
DEPRECATED RDW RBC AUTO: 45.4 FL (ref 37–54)
EGFRCR SERPLBLD CKD-EPI 2021: 29 ML/MIN/1.73
ERYTHROCYTE [DISTWIDTH] IN BLOOD BY AUTOMATED COUNT: 12.9 % (ref 12.3–15.4)
GLUCOSE BLDC GLUCOMTR-MCNC: 172 MG/DL (ref 70–130)
GLUCOSE BLDC GLUCOMTR-MCNC: 233 MG/DL (ref 70–130)
GLUCOSE BLDC GLUCOMTR-MCNC: 250 MG/DL (ref 70–130)
GLUCOSE BLDC GLUCOMTR-MCNC: 262 MG/DL (ref 70–130)
GLUCOSE SERPL-MCNC: 270 MG/DL (ref 65–99)
HBA1C MFR BLD: 8.1 % (ref 4.8–5.6)
HCT VFR BLD AUTO: 34.1 % (ref 37.5–51)
HGB BLD-MCNC: 10.8 G/DL (ref 13–17.7)
MCH RBC QN AUTO: 30.8 PG (ref 26.6–33)
MCHC RBC AUTO-ENTMCNC: 31.7 G/DL (ref 31.5–35.7)
MCV RBC AUTO: 97.2 FL (ref 79–97)
NT-PROBNP SERPL-MCNC: 4541 PG/ML (ref 0–1800)
PLATELET # BLD AUTO: 222 10*3/MM3 (ref 140–450)
PMV BLD AUTO: 10.6 FL (ref 6–12)
POTASSIUM SERPL-SCNC: 4 MMOL/L (ref 3.5–5.2)
PROT ?TM UR-MCNC: 12 MG/DL
PROT/CREAT UR: 157.9 MG/G CREA (ref 0–200)
RBC # BLD AUTO: 3.51 10*6/MM3 (ref 4.14–5.8)
SODIUM SERPL-SCNC: 140 MMOL/L (ref 136–145)
WBC NRBC COR # BLD AUTO: 8.36 10*3/MM3 (ref 3.4–10.8)

## 2024-06-30 PROCEDURE — 94799 UNLISTED PULMONARY SVC/PX: CPT

## 2024-06-30 PROCEDURE — 82948 REAGENT STRIP/BLOOD GLUCOSE: CPT

## 2024-06-30 PROCEDURE — 97165 OT EVAL LOW COMPLEX 30 MIN: CPT

## 2024-06-30 PROCEDURE — G0378 HOSPITAL OBSERVATION PER HR: HCPCS

## 2024-06-30 PROCEDURE — 80048 BASIC METABOLIC PNL TOTAL CA: CPT | Performed by: HOSPITALIST

## 2024-06-30 PROCEDURE — 97530 THERAPEUTIC ACTIVITIES: CPT

## 2024-06-30 PROCEDURE — 63710000001 INSULIN GLARGINE PER 5 UNITS: Performed by: HOSPITALIST

## 2024-06-30 PROCEDURE — 85027 COMPLETE CBC AUTOMATED: CPT | Performed by: HOSPITALIST

## 2024-06-30 PROCEDURE — 94664 DEMO&/EVAL PT USE INHALER: CPT

## 2024-06-30 PROCEDURE — 63710000001 INSULIN LISPRO (HUMAN) PER 5 UNITS: Performed by: HOSPITALIST

## 2024-06-30 PROCEDURE — 83880 ASSAY OF NATRIURETIC PEPTIDE: CPT

## 2024-06-30 PROCEDURE — 97535 SELF CARE MNGMENT TRAINING: CPT

## 2024-06-30 PROCEDURE — 84156 ASSAY OF PROTEIN URINE: CPT

## 2024-06-30 PROCEDURE — 83036 HEMOGLOBIN GLYCOSYLATED A1C: CPT | Performed by: HOSPITALIST

## 2024-06-30 PROCEDURE — 82570 ASSAY OF URINE CREATININE: CPT

## 2024-06-30 PROCEDURE — 97162 PT EVAL MOD COMPLEX 30 MIN: CPT

## 2024-06-30 PROCEDURE — 25010000002 CEFTRIAXONE PER 250 MG: Performed by: HOSPITALIST

## 2024-06-30 RX ORDER — TRAMADOL HYDROCHLORIDE 50 MG/1
50 TABLET ORAL NIGHTLY PRN
Status: DISCONTINUED | OUTPATIENT
Start: 2024-06-30 | End: 2024-07-01 | Stop reason: HOSPADM

## 2024-06-30 RX ADMIN — GUAIFENESIN 1200 MG: 600 TABLET, EXTENDED RELEASE ORAL at 20:49

## 2024-06-30 RX ADMIN — Medication 10 ML: at 20:51

## 2024-06-30 RX ADMIN — APIXABAN 5 MG: 5 TABLET, FILM COATED ORAL at 08:26

## 2024-06-30 RX ADMIN — CEFTRIAXONE SODIUM 1000 MG: 1 INJECTION, POWDER, FOR SOLUTION INTRAMUSCULAR; INTRAVENOUS at 20:48

## 2024-06-30 RX ADMIN — IPRATROPIUM BROMIDE AND ALBUTEROL SULFATE 3 ML: .5; 3 SOLUTION RESPIRATORY (INHALATION) at 15:44

## 2024-06-30 RX ADMIN — APIXABAN 5 MG: 5 TABLET, FILM COATED ORAL at 20:49

## 2024-06-30 RX ADMIN — METOPROLOL TARTRATE 12.5 MG: 25 TABLET, FILM COATED ORAL at 08:26

## 2024-06-30 RX ADMIN — ASPIRIN 81 MG: 81 TABLET, COATED ORAL at 20:49

## 2024-06-30 RX ADMIN — INSULIN GLARGINE 10 UNITS: 100 INJECTION, SOLUTION SUBCUTANEOUS at 21:14

## 2024-06-30 RX ADMIN — LATANOPROST 1 DROP: 50 SOLUTION OPHTHALMIC at 20:54

## 2024-06-30 RX ADMIN — BUMETANIDE 1 MG: 1 TABLET ORAL at 08:26

## 2024-06-30 RX ADMIN — GUAIFENESIN 1200 MG: 600 TABLET, EXTENDED RELEASE ORAL at 08:26

## 2024-06-30 RX ADMIN — BRIMONIDINE TARTRATE 1 DROP: 2 SOLUTION OPHTHALMIC at 20:54

## 2024-06-30 RX ADMIN — AMIODARONE HYDROCHLORIDE 200 MG: 200 TABLET ORAL at 08:26

## 2024-06-30 RX ADMIN — IPRATROPIUM BROMIDE AND ALBUTEROL SULFATE 3 ML: .5; 3 SOLUTION RESPIRATORY (INHALATION) at 19:13

## 2024-06-30 RX ADMIN — INSULIN LISPRO 6 UNITS: 100 INJECTION, SOLUTION INTRAVENOUS; SUBCUTANEOUS at 21:13

## 2024-06-30 RX ADMIN — DULOXETINE HYDROCHLORIDE 30 MG: 30 CAPSULE, DELAYED RELEASE ORAL at 21:13

## 2024-06-30 RX ADMIN — INSULIN LISPRO 6 UNITS: 100 INJECTION, SOLUTION INTRAVENOUS; SUBCUTANEOUS at 11:51

## 2024-06-30 RX ADMIN — INSULIN LISPRO 4 UNITS: 100 INJECTION, SOLUTION INTRAVENOUS; SUBCUTANEOUS at 08:25

## 2024-06-30 RX ADMIN — IPRATROPIUM BROMIDE AND ALBUTEROL SULFATE 3 ML: .5; 3 SOLUTION RESPIRATORY (INHALATION) at 07:57

## 2024-06-30 RX ADMIN — Medication 100 MG: at 08:26

## 2024-06-30 RX ADMIN — BRIMONIDINE TARTRATE 1 DROP: 2 SOLUTION OPHTHALMIC at 08:26

## 2024-06-30 RX ADMIN — TRAMADOL HYDROCHLORIDE 50 MG: 50 TABLET ORAL at 22:55

## 2024-06-30 RX ADMIN — BUMETANIDE 1 MG: 1 TABLET ORAL at 22:56

## 2024-06-30 RX ADMIN — TERAZOSIN 1 MG: 1 CAPSULE ORAL at 22:56

## 2024-06-30 RX ADMIN — Medication 10 ML: at 08:27

## 2024-06-30 RX ADMIN — METOPROLOL TARTRATE 12.5 MG: 25 TABLET, FILM COATED ORAL at 22:56

## 2024-06-30 RX ADMIN — ACETAMINOPHEN 325MG 650 MG: 325 TABLET ORAL at 13:33

## 2024-06-30 NOTE — CONSULTS
CONSULT NOTE    Infectious Diseases - Tino Bright MD  [facility]       Patient Identification:  Name: Dilip Verma  Age: 74 y.o.  Sex: male  :  1950  MRN: [id number]             Date of Consultation: 2024      Primary Care Physician: Fernando Camargo DO                               Requesting Physician: Dr. De La Rosa  Reason for Consultation: Recurrent UTI    Impression: Patient is a 74-year-old male who is well-known to me from previous hospitalization has complicated past medical history including history of chronic kidney disease requiring brief dialysis during previous hospitalization, history of ataxia, anxiety, recent hospitalization for cellulitis of the left lower extremity as well as urinary tract infection with Serratia and then during hospitalization noted to have IV related cellulitis of the left arm requiring course of antibiotic therapy was recently discharged from the hospital on 2024.  Since his release from the hospital patient did well for couple days and started having episodes of weakness and sliding down and unable to get up requiring EMS being called for further left.  He had another episode of weakness in the evening of 2024 with associated confusion.  This resulted in EMS being called and patient was brought to the emergency room for further evaluation.  Workup did reveal abnormal urinalysis consistent with UTI and questionable lung infiltrate.  Patient has been started on IV Rocephin.  Patient did complain of some burning in urination frequency and urgency at the time of admission which is not present at this time.  Blood cultures and urine cultures at time of this evaluation so far has been pending.  Patient seem to be awake interactive and appropriate and waited and appears improved compared to the first mental status changes that he has exhibited during previous hospitalization.  This presentation in the above context is multifactorial and could very  well be due to  1-possible urinary tract infection versus pneumonia versus metabolic encephalopathy due to underlying multiple comorbidities  2-chronic kidney disease with recent need for dialysis with improvement  3-recent cellulitis of left lower extremity and left arm resolved  4-diabetes mellitus  5-chronic hypoxic respiratory failure  6-immobility  7-prolonged hospitalization related to deconditioning    Recommendations/Discussions:  At this juncture I agree with the care plan consisting of supportive care and empiric Rocephin while following up on blood culture and urine culture results.  He seems to have significantly improved from mental status standpoint and his weakness requiring EMS assistance few days ago as well as resulting in admission to the hospital last night could be multifactorial and due to significant impact of deconditioning in the setting of ongoing metabolic issues from kidney disease and diabetes.  Adjust antibiotic treatment based on evolving culture data response to treatment and any side effects.  Thank you very much for letting me be the part of your patient care will follow this patient with you.  Will discuss this care plan with patient's wife.        Past Medical History:  Past Medical History:   Diagnosis Date    AMENA (acute kidney injury) 12/03/2020    Alcoholism 1989    not since 1998    CORI positive     Anemia     Anxiety     Ataxia     Atypical chest pain 04/19/2022    SEEN AT [facility] ER    Balance disorder     Carotid stenosis 03/28/2024    Cataract     BILATERAL, S/P EXTRACTION    Cervical radiculopathy 11/11/2019    SEEN AT  [facility] ER    Cervical spinal cord compression     Chronic diastolic (congestive) heart failure     Chronic kidney disease     STAGE 3, FOLLOWED BY DR. VIVAR    Chronic pancreatitis     Closed left subtrochanteric femur fracture 12/01/2020    ADMITTED TO [facility]    Closed nondisplaced intertrochanteric fracture of left femur 12/01/2020    ADMITTED TO [facility]    Colon  "polyps     FOLLOWED BY DR. AVTAR JEONG    Constipation     Contracture, right hand     Coronary artery disease     CABG 7/2019    COVID-19 07/2022    DDD (degenerative disc disease), cervical     Diabetes mellitus, type II     IDDM    Diabetic retinopathy     Difficulty walking     Dysphagia     Elevated brain natriuretic peptide (BNP) level 08/2014    Elevated LFTs 03/2021    Erectile dysfunction     Fissure, anal 2022    Foot drop     Fuchs' corneal dystrophy of right eye     Glaucoma     BILATERAL    History of alcohol abuse     Hyperlipidemia     Hypersomnia     Hypertension     Hypertensive urgency 02/25/2020    ADMITTED TO [facility]    Insomnia     Kidney stones     LV dysfunction 06/2016    Lyme disease     Lymphadenopathy syndrome 05/2021    Macular edema     BILATERAL    Myocardial infarction 11/05/2019    NSTEMI, ADMITTED TO [facility]    Myocardial infarction 07/12/2019    NSTEMI, ADMITTED TO [facility]    Neuropathy in diabetes     Non-celiac gluten sensitivity     Orthostasis     Osteoporosis     Oxygen dependent     PAD (peripheral artery disease)     Paroxysmal atrial fibrillation 06/06/2024    PNA (pneumonia) 06/2016    LEFT LOBE    Polyneuropathy     PTSD (post-traumatic stress disorder)     Pulmonary hypertension     Pulmonary nodule     Senile ectropion of both lower eyelids 02/2022    Sepsis 12/16/2020    D/T UTI, ADMITTED TO [facility]    Sleep apnea     STATES DOESN'T USE BIPAP OR CPAP    Spinal stenosis in cervical region     SEVERE-LIMITED ROM    Stroke 2012    \"slight stroke\"    Syncope and collapse 07/06/2019    ADMITTED TO [place]    Urinary retention 04/05/2021    SEEN AT [facility] ER    UTI (urinary tract infection)     Vision loss     Vitamin D deficiency      Past Surgical History:  Past Surgical History:   Procedure Laterality Date    CARDIAC CATHETERIZATION N/A 07/15/2019    Procedure: Coronary angiography;  Surgeon: Carrie Price MD;  Location:  [facility] C[facility]VASIVE [facility]ATION;  Service: Cardiovascular    CARDIAC " CATHETERIZATION N/A 07/15/2019    Procedure: Left Heart Cath;  Surgeon: Carrie Price MD;  Location: [facility] CATH INVASIVE LOCATION;  Service: Cardiovascular    CARDIAC CATHETERIZATION N/A 07/15/2019    Procedure: Left ventriculography;  Surgeon: Carrie Price MD;  Location:  [facility]VASIVE LOCATION;  Service: Cardiovascular    CARDIAC CATHETERIZATION  07/15/2019    Procedure: Functional Flow Luther;  Surgeon: Carrie Price MD;  Location: [facility] CATH INVASIVE LOCATION;  Service: Cardiovascular    CARDIAC CATHETERIZATION N/A 11/06/2019    Procedure: Right and Left Heart Cath;  Surgeon: Mya Smith MD;  Location:  [facility]VASIVE LOCATION;  Service: Cardiovascular    CARDIAC CATHETERIZATION N/A 11/06/2019    Procedure: Coronary angiography;  Surgeon: Mya Smith MD;  Location: [facility] CATH INVASIVE LOCATION;  Service: Cardiovascular    CARDIAC SURGERY      CATARACT EXTRACTION Left 2014    CATARACT EXTRACTION Right 11/2016    PHACO/IOL, DR. LEN DENTON    COLONOSCOPY N/A 03/16/2023    ENTIRE COLON WNL, RESCOPE IN 5 YRS, DR. LINDA LIZARRAGA AT [facility]    COLONOSCOPY W/ POLYPECTOMY N/A 01/02/2015    A FEW DIVERTICULA IN SIGMOID, 6 MM TUBULOVILLOUS ADENOMA POLYP IN RECTUM, SMALL HEMORRHOIDS, MELANOSIS COLI, DR. AVTAR JEONG AT [facility]    CORONARY ARTERY BYPASS GRAFT N/A 07/18/2019    Procedure: INTRAOPERATIVE SHOAIB; STERNOTOMY CORONARY ARTERY BYPASS x 3  USING LEFT INTERNAL MAMMARY ARTERY GRAFT UTILIZING ENDOSCOPICALLY HARVESTED RIGHT GREATER SAPHENOUS VEIN AND PRP.;  Surgeon: Bill Devi MD;  Location: [facility] MAIN OR;  Service: Cardiothoracic    CYSTOSCOPY BLADDER STONE LITHOTRIPSY N/A     DENTAL PROCEDURE Bilateral     3 surgeries inder implants    FEMUR IM NAILING/RODDING Left 12/03/2020    Procedure: LEFT HIP INTRAMEDULLARY NAIL;  Surgeon: Niraj Ravi MD;  Location: [facility] MAIN OR;  Service: Orthopedic Spine;  Laterality: Left;    INCISION AND DRAINAGE PERIRECTAL ABSCESS N/A  10/04/2023    Procedure: Incision and drainage of perianal and buttock abscess;  Surgeon: Herbie Villatoro MD;  Location: [facility] MAIN OR;  Service: General;  Laterality: N/A;    INGUINAL HERNIA REPAIR Bilateral     TOENAIL EXCISION  06/2022    TONSILLECTOMY Bilateral     TOTAL HIP ARTHROPLASTY REVISION Left 01/11/2022    Procedure: TOTAL HIP ARTHROPLASTY REVISION- POSTERIOR;  Surgeon: Jurgen Candelaria II, MD;  Location: [facility] MAIN OR;  Service: Orthopedics;  Laterality: Left;    VASECTOMY N/A       Home Meds:  Medications Prior to Admission   Medication Sig Dispense Refill Last Dose    amiodarone (PACERONE) 200 MG tablet Take 1 tablet by mouth Daily. 30 tablet 3 6/28/2024    apixaban (ELIQUIS) 5 MG tablet tablet Take 1 tablet by mouth Every 12 (Twelve) Hours. Indications: Atrial Fibrillation 60 tablet 5 6/28/2024    aspirin 81 MG EC tablet Take 1 tablet by mouth Every Night.   6/27/2024    brimonidine (ALPHAGAN) 0.2 % ophthalmic solution Administer 1 drop to both eyes 2 (Two) Times a Day.   6/28/2024    bumetanide (BUMEX) 1 MG tablet Take 1 tablet by mouth 2 (Two) Times a Day. 180 tablet 1 6/28/2024    Cholecalciferol (Vitamin D-3) 125 MCG (5000 UT) tablet Take 1 tablet by mouth Daily.   6/28/2024    DULoxetine (CYMBALTA) 30 MG capsule Take 1 capsule by mouth Every Night.   6/27/2024    hydrocortisone 1 % cream Apply 1 Application topically to the appropriate area as directed 2 (Two) Times a Day. 28 g 0 6/28/2024    Insulin Glargine, 2 Unit Dial, (TOUJEO) 300 UNIT/ML solution pen-injector injection Inject 10 Units under the skin into the appropriate area as directed Daily.   6/28/2024    insulin lispro (humaLOG) 100 UNIT/ML injection Inject 0-14 Units under the skin into the appropriate area as directed 3 (Three) Times a Day Before Meals. (Patient taking differently: Inject 0-14 Units under the skin into the appropriate area as directed 3 (Three) Times a Day Before Meals. SLIDING SCALE  100-150 - 4  units  151-200 - 5 units  201-250 - 6 units  251-300 - 7 units  301-350 - 8 units  351-400 - 9 units  401+ - 10 units)  12 6/28/2024    latanoprost (XALATAN) 0.005 % ophthalmic solution Administer 1 drop to both eyes every night at bedtime.   6/27/2024    Magnesium 400 MG capsule Take 400 mg by mouth As Needed.   Past Week    metoprolol tartrate (LOPRESSOR) 25 MG tablet Take 0.5 tablets by mouth Every 12 (Twelve) Hours. 90 tablet 1 6/28/2024    multivitamin with minerals (MULTIVITAMIN ADULTS PO) Take 1 tablet by mouth Daily.   6/28/2024    terazosin (HYTRIN) 1 MG capsule Take 1 capsule by mouth Every Night. 30 capsule 0 6/27/2024    testosterone (ANDROGEL) 25 MG/2.5GM (1%) gel gel Place 25 mg on the skin as directed by provider 2 (Two) Times a Week.   Past Week    thiamine (VITAMIN B1) 100 MG tablet Take 1 tablet by mouth Daily. 30 tablet 0 6/28/2024    traMADol (ULTRAM) 50 MG tablet Take 1 tablet by mouth At Night As Needed.   6/27/2024    sildenafil (REVATIO) 20 MG tablet Take 1 tablet by mouth As Needed.   More than a month     Current Meds:     Current Facility-Administered Medications:     acetaminophen (TYLENOL) tablet 650 mg, 650 mg, Oral, Q4H PRN, 650 mg at 06/29/24 0236 **OR** [DISCONTINUED] acetaminophen (TYLENOL) 160 MG/5ML oral solution 650 mg, 650 mg, Oral, Q4H PRN **OR** [DISCONTINUED] acetaminophen (TYLENOL) suppository 650 mg, 650 mg, Rectal, Q4H PRN, Murphy Hdz MD    amiodarone (PACERONE) tablet 200 mg, 200 mg, Oral, Q24H, Murphy Hdz MD, 200 mg at 06/30/24 0826    apixaban (ELIQUIS) tablet 5 mg, 5 mg, Oral, Q12H, Murphy Hdz MD, 5 mg at 06/30/24 0826    aspirin EC tablet 81 mg, 81 mg, Oral, Nightly, Murphy Hdz MD, 81 mg at 06/29/24 2156    sennosides-docusate (PERICOLACE) 8.6-50 MG per tablet 2 tablet, 2 tablet, Oral, BID PRN **AND** polyethylene glycol (MIRALAX) packet 17 g, 17 g, Oral, Daily PRN **AND** bisacodyl (DULCOLAX) EC tablet 5 mg, 5 mg, Oral, Daily PRN **AND** bisacodyl  (DULCOLAX) suppository 10 mg, 10 mg, Rectal, Daily PRN, Murphy Hdz MD    brimonidine (ALPHAGAN) 0.2 % ophthalmic solution 1 drop, 1 drop, Both Eyes, BID, Murphy Hdz MD, 1 drop at 06/30/24 0826    bumetanide (BUMEX) tablet 1 mg, 1 mg, Oral, BID, Murphy Hdz MD, 1 mg at 06/30/24 0826    cefTRIAXone (ROCEPHIN) 1,000 mg in sodium chloride 0.9 % 100 mL MBP, 1,000 mg, Intravenous, Q24H, Lasha De La Rosa MD, Last Rate: 200 mL/hr at 06/29/24 2157, 1,000 mg at 06/29/24 2157    dextrose (D50W) (25 g/50 mL) IV injection 25 g, 25 g, Intravenous, Q15 Min PRN, Lasha De La Rosa MD    dextrose (GLUTOSE) oral gel 15 g, 15 g, Oral, Q15 Min PRN, Lasha De La Rosa MD    DULoxetine (CYMBALTA) DR capsule 30 mg, 30 mg, Oral, Nightly, Murphy Hdz MD, 30 mg at 06/29/24 2156    glucagon (GLUCAGEN) injection 1 mg, 1 mg, Intramuscular, Q15 Min PRN, Lasha De La Rosa MD    guaiFENesin (MUCINEX) 12 hr tablet 1,200 mg, 1,200 mg, Oral, Q12H, Lasha De La Rosa MD, 1,200 mg at 06/30/24 0826    insulin glargine (LANTUS, SEMGLEE) injection 6 Units, 6 Units, Subcutaneous, Nightly, Murphy Hdz MD    insulin lispro (HUMALOG/ADMELOG) injection 2-9 Units, 2-9 Units, Subcutaneous, 4x Daily AC & at Bedtime, Lasha De La Rosa MD, 4 Units at 06/30/24 0825    ipratropium-albuterol (DUO-NEB) nebulizer solution 3 mL, 3 mL, Nebulization, Q4H While Awake - RT, Lasha De La Rosa MD, 3 mL at 06/30/24 0757    latanoprost (XALATAN) 0.005 % ophthalmic solution 1 drop, 1 drop, Both Eyes, Nightly, Murphy Hdz MD, 1 drop at 06/29/24 2156    melatonin tablet 5 mg, 5 mg, Oral, Nightly PRN, Murphy Hdz MD    metoprolol tartrate (LOPRESSOR) tablet 12.5 mg, 12.5 mg, Oral, Q12H, Murphy Hdz MD, 12.5 mg at 06/30/24 0826    ondansetron ODT (ZOFRAN-ODT) disintegrating tablet 4 mg, 4 mg, Oral, Q6H PRN **OR** ondansetron (ZOFRAN) injection 4 mg, 4 mg, Intravenous, Q6H PRN, Murphy Hdz MD    sodium chloride 0.9 % flush 10 mL, 10 mL, Intravenous, Q12H, Murphy Hdz MD, 10  mL at 24 0827    sodium chloride 0.9 % flush 10 mL, 10 mL, Intravenous, PRN, Murphy Hdz MD    sodium chloride 0.9 % infusion 40 mL, 40 mL, Intravenous, PRN, Murphy Hdz MD    terazosin (HYTRIN) capsule 1 mg, 1 mg, Oral, Nightly, Murphy Hdz MD, 1 mg at 24 2156    thiamine (VITAMIN B-1) tablet 100 mg, 100 mg, Oral, Daily, Murphy Hdz MD, 100 mg at 24 0826  Allergies:  Allergies   Allergen Reactions    Ace Inhibitors Angioedema    Angiotensin Receptor Blockers Angioedema and Unknown (See Comments)     Angioneurotic edema    Dapagliflozin Other (See Comments)     UTI,  rectal abcess    Losartan Angioedema     Angioneurotic edema    Seroquel [Quetiapine] Hallucinations    Xanax [Alprazolam] Unknown - High Severity    Amlodipine Swelling    Ativan [Lorazepam] Hallucinations    Baclofen Hallucinations    Misc. Sulfonamide Containing Compounds Unknown (See Comments)    Minoxidil Confusion    Tetracycline Rash     bliisters in mouth       Social History:   Social History     Tobacco Use    Smoking status: Former     Current packs/day: 0.00     Average packs/day: 0.8 packs/day for 16.0 years (12.0 ttl pk-yrs)     Types: Cigarettes     Start date: 1984     Quit date: 2000     Years since quittin.5     Passive exposure: Past    Smokeless tobacco: Never    Tobacco comments:     Start age:39/Stopping age:47, 3/4 PPD   Substance Use Topics    Alcohol use: Not Currently     Comment: none since       Family History:  Family History   Problem Relation Age of Onset    Heart disease Mother     Hypertension Mother     Hyperlipidemia Mother     Depression Mother     Cancer Mother         uterine    Arrhythmia Mother     Uterine cancer Mother     Mental illness Mother     Migraines Mother     Hyperlipidemia Father     Heart disease Father     Hypertension Father     Kidney disease Father     Thyroid disease Father     Heart failure Father     Depression Sister     Alcohol abuse Brother      "Thyroid disease Paternal Uncle     Lung disease Paternal Uncle     Stroke Maternal Grandmother     Glaucoma Maternal Grandmother     Heart attack Paternal Grandmother     Stroke Paternal Grandmother     Alcohol abuse Paternal Grandfather     Malig Hyperthermia Neg Hx           Review of Systems  See history of present illness and past medical history.      Vitals:   /61 (BP Location: Right arm, Patient Position: Lying)   Pulse 65   Temp 98.2 °F (36.8 °C) (Oral)   Resp 18   Ht 172.7 cm (68\")   Wt 85.3 kg (188 lb)   SpO2 100%   BMI 28.59 kg/m²   I/O:   Intake/Output Summary (Last 24 hours) at 6/30/2024 0845  Last data filed at 6/30/2024 0500  Gross per 24 hour   Intake 480 ml   Output 1050 ml   Net -570 ml     Exam:  Patient is examined using the personal protective equipment as per guidelines from infection control for this particular patient as enacted.  Hand washing was performed before and after patient interaction.  General Appearance:  Alert cooperative pleasantly forgetful male who does not appear to be in any acute distress   Head:    Normocephalic, without obvious abnormality, atraumatic   Eyes:    PERRL, conjunctivae/corneas clear, EOM's intact, both eyes   Ears:    Normal external ear canals, both ears   Nose:   Nares normal, septum midline, mucosa normal, no drainage    or sinus tenderness   Throat:   Lips, tongue, gums normal; oral mucosa pink and moist   Neck: Supple and no adenopathy   Back:     Symmetric, no curvature, ROM normal, no CVA tenderness   Lungs:   Decreased breath sounds at the bases   Chest Wall:    No tenderness or deformity    Heart:  S1-S2 irregular   Abdomen:   Obese soft nontender   Extremities: Bilateral lower extremity trace edema with no cellulitis left arm and left leg much improved.   Pulses:   Pulses palpable in all extremities; symmetric all extremities   Skin: Ecchymotic changes but no cellulitis.   Neurologic: Awake interactive and appropriate though at times " forgetful.         Data Review:    I reviewed the patient's new clinical results.  Results from last 7 days   Lab Units 06/30/24  0407 06/29/24 0321 06/28/24 1914 06/25/24  0737   WBC 10*3/mm3 8.36 8.60 9.04 6.69   HEMOGLOBIN g/dL 10.8* 10.4* 11.1* 10.0*   PLATELETS 10*3/mm3 222 211 243 207     Results from last 7 days   Lab Units 06/30/24  0407 06/29/24 0321 06/28/24 1914 06/25/24  0737 06/24/24  0640   SODIUM mmol/L 140 140 140 141 138   POTASSIUM mmol/L 4.0 3.7 3.8 4.1 4.5   CHLORIDE mmol/L 100 100 102 104 101   CO2 mmol/L 33.0* 28.6 28.0 31.0* 29.1*   BUN mg/dL 36* 31* 34* 42* 42*   CREATININE mg/dL 2.29* 1.95* 2.16* 2.63* 3.11*   CALCIUM mg/dL 8.7 8.4* 8.3* 8.2* 8.7   GLUCOSE mg/dL 270* 241* 170* 181* 177*     Brief Urine Lab Results  (Last result in the past 365 days)        Color   Clarity   Blood   Leuk Est   Nitrite   Protein   CREAT   Urine HCG        06/28/24 1903 Yellow   Clear   Negative   Moderate (2+)   Negative   Negative                 Microbiology Results (last 10 days)       Procedure Component Value - Date/Time    Blood Culture - Blood, Hand, Left [[id number]]  (Normal) Collected: 06/28/24 2139    Lab Status: Preliminary result Specimen: Blood from Hand, Left Updated: 06/29/24 2200     Blood Culture No growth at 24 hours    Blood Culture - Blood, Hand, Left [[id number]]  (Normal) Collected: 06/28/24 2139    Lab Status: Preliminary result Specimen: Blood from Hand, Left Updated: 06/29/24 2200     Blood Culture No growth at 24 hours    Urine Culture - Urine, Urine, Clean Catch [[id number]]  (Abnormal) Collected: 06/21/24 1459    Lab Status: Final result Specimen: Urine, Clean Catch Updated: 06/22/24 1515     Urine Culture Yeast isolated     Comment: No further workup.         Narrative:      6.22 100k yst. DCM  Colonization of the urinary tract without infection is common. Treatment is discouraged unless the patient is symptomatic, pregnant, or undergoing an invasive urologic procedure.               Assessment:  Active Hospital Problems    Diagnosis  POA    **Acute UTI (urinary tract infection) [N39.0]  Yes    Paroxysmal atrial fibrillation [I48.0]  Yes    Metabolic encephalopathy [G93.41]  Yes    Peripheral arterial disease [I73.9]  Yes    CKD (chronic kidney disease) stage 3, GFR 30-59 ml/min [N18.30]  Yes    HTN (hypertension) [I10]  Yes    Chronic diastolic heart failure [I50.32]  Yes    CAD (coronary artery disease) [I25.10]  Yes      Resolved Hospital Problems   No resolved problems to display.         Plan:  See above  Tino Nagel MD   6/30/2024  08:45 EDT    Parts of this note may be an electronic transcription/translation of spoken language to printed text using the Dragon dictation system.

## 2024-06-30 NOTE — THERAPY EVALUATION
Patient Name: Dilip Verma  : 1950    MRN: [id number]                              Today's Date: 2024       Admit Date: 2024    Visit Dx:     ICD-10-CM ICD-9-CM   1. Altered mental status, unspecified altered mental status type  R41.82 780.97   2. Acute UTI  N39.0 599.0   3. Multiple falls  R29.6 V15.88     Patient Active Problem List   Diagnosis    Anxiety    Colon polyp    Type 2 diabetes mellitus with hyperglycemia, with long-term current use of insulin    Erectile dysfunction    Hyperlipidemia    Type 2 acute myocardial infarction    CAD (coronary artery disease)    Hx of CABG    Chronic diastolic heart failure    Hypersomnia due to medical condition    CSA (central sleep apnea)    Periodic breathing    HTN (hypertension)    History of stroke    CKD (chronic kidney disease) stage 3, GFR 30-59 ml/min    Urinary retention    Acute on chronic renal failure    Acute UTI (urinary tract infection)    Acute on chronic diastolic (congestive) heart failure    Bacteriuria    Rectal pain    Status post total replacement of hip    Vitamin D deficiency    Post-acute COVID-19 syndrome    Insulin dose changed    Arthropathy associated with neurological disorder    Personal history of colonic polyps    Type 2 diabetes mellitus with diabetic neuropathy, with long-term current use of insulin    Cervical spinal stenosis    Abscess of skin    Overweight    Cervical stenosis of spine    Spinal stenosis, lumbar region, without neurogenic claudication    Medically noncompliant    Chronic heart failure with preserved ejection fraction (HFpEF)    Carotid stenosis    Peripheral arterial disease    Sepsis    Hyperkalemia    Metabolic encephalopathy    AMENA (acute kidney injury)    Paroxysmal atrial fibrillation     Past Medical History:   Diagnosis Date    AMENA (acute kidney injury) 2020    Alcoholism 1989    not since     CORI positive     Anemia     Anxiety     Ataxia     Atypical chest pain 2022     SEEN AT [facility] ER    Balance disorder     Carotid stenosis 03/28/2024    Cataract     BILATERAL, S/P EXTRACTION    Cervical radiculopathy 11/11/2019    SEEN AT  [facility] ER    Cervical spinal cord compression     Chronic diastolic (congestive) heart failure     Chronic kidney disease     STAGE 3, FOLLOWED BY DR. VIVAR    Chronic pancreatitis     Closed left subtrochanteric femur fracture 12/01/2020    ADMITTED TO [facility]    Closed nondisplaced intertrochanteric fracture of left femur 12/01/2020    ADMITTED TO [facility]    Colon polyps     FOLLOWED BY DR. AVTAR JEONG    Constipation     Contracture, right hand     Coronary artery disease     CABG 7/2019    COVID-19 07/2022    DDD (degenerative disc disease), cervical     Diabetes mellitus, type II     IDDM    Diabetic retinopathy     Difficulty walking     Dysphagia     Elevated brain natriuretic peptide (BNP) level 08/2014    Elevated LFTs 03/2021    Erectile dysfunction     Fissure, anal 2022    Foot drop     Fuchs' corneal dystrophy of right eye     Glaucoma     BILATERAL    History of alcohol abuse     Hyperlipidemia     Hypersomnia     Hypertension     Hypertensive urgency 02/25/2020    ADMITTED TO [facility]    Insomnia     Kidney stones     LV dysfunction 06/2016    Lyme disease     Lymphadenopathy syndrome 05/2021    Macular edema     BILATERAL    Myocardial infarction 11/05/2019    NSTEMI, ADMITTED TO [facility]    Myocardial infarction 07/12/2019    NSTEMI, ADMITTED TO [facility]    Neuropathy in diabetes     Non-celiac gluten sensitivity     Orthostasis     Osteoporosis     Oxygen dependent     PAD (peripheral artery disease)     Paroxysmal atrial fibrillation 06/06/2024    PNA (pneumonia) 06/2016    LEFT LOBE    Polyneuropathy     PTSD (post-traumatic stress disorder)     Pulmonary hypertension     Pulmonary nodule     Senile ectropion of both lower eyelids 02/2022    Sepsis 12/16/2020    D/T UTI, ADMITTED TO [facility]    Sleep apnea     STATES DOESN'T USE BIPAP OR CPAP    Spinal stenosis in  "cervical region     SEVERE-LIMITED ROM    Stroke 2012    \"slight stroke\"    Syncope and collapse 07/06/2019    ADMITTED TO [place]    Urinary retention 04/05/2021    SEEN AT [facility] ER    UTI (urinary tract infection)     Vision loss     Vitamin D deficiency      Past Surgical History:   Procedure Laterality Date    CARDIAC CATHETERIZATION N/A 07/15/2019    Procedure: Coronary angiography;  Surgeon: Carrie Price MD;  Location:  [facility];  Service: Cardiovascular    CARDIAC CATHETERIZATION N/A 07/15/2019    Procedure: Left Heart Cath;  Surgeon: Carrie Price MD;  Location:  [facility];  Service: Cardiovascular    CARDIAC CATHETERIZATION N/A 07/15/2019    Procedure: Left ventriculography;  Surgeon: Carrie Price MD;  Location:  [facility];  Service: Cardiovascular    CARDIAC CATHETERIZATION  07/15/2019    Procedure: Functional Flow Guys;  Surgeon: Carrie Price MD;  Location:  [facility];  Service: Cardiovascular    CARDIAC CATHETERIZATION N/A 11/06/2019    Procedure: Right and Left Heart Cath;  Surgeon: Mya Smith MD;  Location:  [facility];  Service: Cardiovascular    CARDIAC CATHETERIZATION N/A 11/06/2019    Procedure: Coronary angiography;  Surgeon: Mya Smith MD;  Location:  [facility];  Service: Cardiovascular    CARDIAC SURGERY      CATARACT EXTRACTION Left 2014    CATARACT EXTRACTION Right 11/2016    PHACO/IOL, DR. LEN DENTON    COLONOSCOPY N/A 03/16/2023    ENTIRE COLON WNL, RESCOPE IN 5 YRS, DR. LINDA LIZARRAGA AT [facility]    COLONOSCOPY W/ POLYPECTOMY N/A 01/02/2015    A FEW DIVERTICULA IN SIGMOID, 6 MM TUBULOVILLOUS ADENOMA POLYP IN RECTUM, SMALL HEMORRHOIDS, MELANOSIS COLI, DR. AVTAR JEONG AT [facility]    CORONARY ARTERY BYPASS GRAFT N/A 07/18/2019    Procedure: INTRAOPERATIVE SHOAIB; STERNOTOMY CORONARY ARTERY BYPASS x 3  USING LEFT INTERNAL MAMMARY ARTERY GRAFT UTILIZING " ENDOSCOPICALLY HARVESTED RIGHT GREATER SAPHENOUS VEIN AND PRP.;  Surgeon: Bill Devi MD;  Location: [facility] MAIN OR;  Service: Cardiothoracic    CYSTOSCOPY BLADDER STONE LITHOTRIPSY N/A     DENTAL PROCEDURE Bilateral     3 surgeries inder implants    FEMUR IM NAILING/RODDING Left 12/03/2020    Procedure: LEFT HIP INTRAMEDULLARY NAIL;  Surgeon: Niraj Ravi MD;  Location: [facility] MAIN OR;  Service: Orthopedic Spine;  Laterality: Left;    INCISION AND DRAINAGE PERIRECTAL ABSCESS N/A 10/04/2023    Procedure: Incision and drainage of perianal and buttock abscess;  Surgeon: Herbie Villatoro MD;  Location: [facility] MAIN OR;  Service: General;  Laterality: N/A;    INGUINAL HERNIA REPAIR Bilateral     TOENAIL EXCISION  06/2022    TONSILLECTOMY Bilateral     TOTAL HIP ARTHROPLASTY REVISION Left 01/11/2022    Procedure: TOTAL HIP ARTHROPLASTY REVISION- POSTERIOR;  Surgeon: Jurgen Candelaria II, MD;  Location: [facility] MAIN OR;  Service: Orthopedics;  Laterality: Left;    VASECTOMY N/A       General Information       Row Name 06/30/24 1505          OT Time and Intention    Document Type evaluation  -RE     Mode of Treatment individual therapy;occupational therapy  -RE       Row Name 06/30/24 1505          General Information    Patient Profile Reviewed yes  -RE     Prior Level of Function transfer;dependent:;ADL's;mod assist:  Patient's wife reports that they had to call EMS several times to assist when he slid to the floor when they tried to transfer.  -RE     Existing Precautions/Restrictions fall;oxygen therapy device and L/min  -RE     Barriers to Rehab medically complex;previous functional deficit  -RE       Row Name 06/30/24 1505          Living Environment    People in Home spouse  -RE       Row Name 06/30/24 1505          Cognition    Orientation Status (Cognition) oriented x 3  -RE       Row Name 06/30/24 1505          Safety Issues, Functional Mobility    Safety Issues Affecting Function (Mobility)  awareness of need for assistance;insight into deficits/self-awareness;positioning of assistive device  -RE     Impairments Affecting Function (Mobility) endurance/activity tolerance;grasp;strength  -RE               User Key  (r) = Recorded By, (t) = Taken By, (c) = Cosigned By      Initials Name Provider Type    Irma Little OTR Occupational Therapist                     Mobility/ADL's       Row Name 06/30/24 1508          Bed Mobility    Bed Mobility supine-sit-supine  -RE     Supine-Sit-Supine [place] (Bed Mobility) minimum assist (75% patient effort);verbal cues  -RE       Row Name 06/30/24 1508          Sit-Stand Transfer    Sit-Stand [place] (Transfers) moderate assist (50% patient effort)  -RE       Row Name 06/30/24 1508          Functional Mobility    Functional Mobility- Comment Patient's wife reports that he uses a rollator at home.  He does not have a functional grasp in his right hand.  He also has a platform walker but he does not use it.  He does not like it.  -RE       Row Name 06/30/24 1508          Activities of Daily Living    BADL Assessment/Intervention lower body dressing  -RE       Row Name 06/30/24 1508          Lower Body Dressing Assessment/Training    [place] Level (Lower Body Dressing) doff;don;socks;moderate assist (50% patient effort)  Dependent with left leg.  Wife reports she assists with socks.  He has a sock aid but is unable to use it due to the stroke that affected his right hand.  -RE     Position (Lower Body Dressing) unsupported sitting;edge of bed sitting  -RE               User Key  (r) = Recorded By, (t) = Taken By, (c) = Cosigned By      Initials Name Provider Type    Irma Little OTR Occupational Therapist                   Obj/Interventions       Row Name 06/30/24 1513          Sensory Assessment (Somatosensory)    Sensory Assessment Decreased sensation in the right hand.  -RE       Row Name 06/30/24 1513          Range of Motion Comprehensive     Comment, General Range of Motion Patient's left upper extremity active range of motion is within normal limits.  Patient's right upper extremity is within normal limits with the exception of the hand and the wrist.  Patient does not have active range of motion of his right wrist or hand.  Mild contractures are noted in the fingers.  Hand is also slightly swollen today  -RE       Row Name 06/30/24 1513          Strength Comprehensive (MMT)    Comment, General Manual Muscle Testing (MMT) Assessment Bilateral upper extremity strength is grossly within functional limits with the exception of the right hand and wrist.  -RE               User Key  (r) = Recorded By, (t) = Taken By, (c) = Cosigned By      Initials Name Provider Type    RE Irma Bower, ANURAG Occupational Therapist                   Goals/Plan       Row Name 06/30/24 1526          Transfer Goal 1 (OT)    Activity/Assistive Device (Transfer Goal 1, OT) commode, bedside without drop arms;shower chair  -RE     Branch Level/Cues Needed (Transfer Goal 1, OT) minimum assist (75% or more patient effort)  -RE     Time Frame (Transfer Goal 1, OT) long term goal (LTG);by discharge  -RE     Progress/Outcome (Transfer Goal 1, OT) continuing progress toward goal  -RE       Row Name 06/30/24 1526          Dressing Goal 1 (OT)    Activity/Device (Dressing Goal 1, OT) dressing skills, all  -RE     Branch/Cues Needed (Dressing Goal 1, OT) minimum assist (75% or more patient effort)  -RE     Time Frame (Dressing Goal 1, OT) long term goal (LTG);by discharge  -RE     Progress/Outcome (Dressing Goal 1, OT) continuing progress toward goal  -RE       Row Name 06/30/24 1526          Therapy Assessment/Plan (OT)    Planned Therapy Interventions (OT) adaptive equipment training;BADL retraining;patient/caregiver education/training;transfer/mobility retraining  -RE               User Key  (r) = Recorded By, (t) = Taken By, (c) = Cosigned By      Initials Name Provider Type     RE Irma Bower, SANDYR Occupational Therapist                   Clinical Impression       Row Name 06/30/24 1514          Pain Assessment    Pretreatment Pain Rating 0/10 - no pain  -RE       Row Name 06/30/24 1514          Plan of Care Review    Plan of Care Reviewed With patient;spouse  -RE     Progress improving  -RE     Outcome Evaluation Patient is a 74-year-old man who presented to the hospital for treatment of an acute UTI.  Patient's wife reports that following his discharge from [facility] he returned home with home health.  Apparently, he fell several times and required EMS to assist to get up from the floor.  His wife was assisting with all ADLs including toileting.  Patient is unable to use a urinal independently.  Patient has a history of a prior CVA which resulted in minimal right hand and wrist function.  Patient uses his right upper extremity as an active assist.  He attempts to place the hand on the walker and uses tone/the finger contractures to .  Current deficits include decreased independence with bathroom transfers and decreased ability to perform all ADLs including toileting.  I had a long discussion with patient's wife regarding some sort of rehab.  Apparently, they had a bad experience with rehab during [facility] and she does not want him to go back.  I expressed my concern that he will not obtain enough independence with transfers and ADLs for it to be safe for him to be discharged home.  I highly recommended subacute rehab or acute rehab to increase independence with transfers and ADLs.  He will benefit from occupational therapy in the acute care setting to increase independence with ADLs and bathroom transfers.  -RE       Row Name 06/30/24 1514          Therapy Assessment/Plan (OT)    Rehab Potential (OT) good, to achieve stated therapy goals  -RE     Criteria for Skilled Therapeutic Interventions Met (OT) yes;skilled treatment is necessary  -RE     Therapy Frequency (OT) 5 times/wk   -RE     Predicted Duration of Therapy Intervention (OT) 1 week  -RE       Row Name 06/30/24 1514          Therapy Plan Review/Discharge Plan (OT)    Equipment Needs Upon Discharge (OT) shower chair  -RE     Anticipated Discharge Disposition (OT) inpatient rehabilitation facility;sub acute care setting  -RE       Row Name 06/30/24 1514          Positioning and Restraints    Pre-Treatment Position in bed  -RE     Post Treatment Position bed  -RE     In Bed supine;exit alarm on;with family/caregiver;side rails up x2  -RE               User Key  (r) = Recorded By, (t) = Taken By, (c) = Cosigned By      Initials Name Provider Type    RE Irma Bower OTR Occupational Therapist                   Outcome Measures       Row Name 06/30/24 1529          How much help from another is currently needed...    Putting on and taking off regular lower body clothing? 1  -RE     Bathing (including washing, rinsing, and drying) 2  -RE     Toileting (which includes using toilet bed pan or urinal) 1  -RE     Putting on and taking off regular upper body clothing 3  -RE     Taking care of personal grooming (such as brushing teeth) 4  -RE     Eating meals 4  -RE     AM-PAC 6 Clicks Score (OT) 15  -RE       Row Name 06/30/24 1231          How much help from another person do you currently need...    Turning from your back to your side while in flat bed without using bedrails? 3  -ZB     Moving from lying on back to sitting on the side of a flat bed without bedrails? 3  -ZB     Moving to and from a bed to a chair (including a wheelchair)? 2  -ZB     Standing up from a chair using your arms (e.g., wheelchair, bedside chair)? 2  -ZB     Climbing 3-5 steps with a railing? 1  -ZB     To walk in hospital room? 2  -ZB     AM-PAC 6 Clicks Score (PT) 13  -ZB     Highest Level of Mobility Goal 4 --> Transfer to chair/commode  -ZB       Row Name 06/30/24 1529 06/30/24 1231       Functional Assessment    Outcome Measure Options AM-PAC 6 Clicks Daily  Activity (OT)  -RE AM-PAC 6 Clicks Basic Mobility (PT)  -ZB              User Key  (r) = Recorded By, (t) = Taken By, (c) = Cosigned By      Initials Name Provider Type    Irma Little OTR Occupational Therapist    Gerald Quiroz, SHEFALI Physical Therapist                    Occupational Therapy Education       Title: PT OT SLP Therapies (Done)       Topic: Occupational Therapy (Done)       Point: ADL training (Done)       Description:   Instruct learner(s) on proper safety adaptation and remediation techniques during self care or transfers.   Instruct in proper use of assistive devices.                  Learning Progress Summary             Patient Acceptance, E,TB, VU,NR by RE at 6/30/2024 1530    Comment: I discussed with the patient and his wife my recommendation of acute or subacute inpatient rehab.  He had a bad experience with rehab during [facility] and does not want to return.   Significant Other Acceptance, E,TB, VU,NR by RE at 6/30/2024 1530    Comment: I discussed with the patient and his wife my recommendation of acute or subacute inpatient rehab.  He had a bad experience with rehab during COVID and does not want to return.                         Point: Home exercise program (Done)       Description:   Instruct learner(s) on appropriate technique for monitoring, assisting and/or progressing therapeutic exercises/activities.                  Learning Progress Summary             Patient Acceptance, E,TB, VU,NR by RE at 6/30/2024 1530    Comment: I discussed with the patient and his wife my recommendation of acute or subacute inpatient rehab.  He had a bad experience with rehab during COVID and does not want to return.   Significant Other Acceptance, E,TB, VU,NR by RE at 6/30/2024 1530    Comment: I discussed with the patient and his wife my recommendation of acute or subacute inpatient rehab.  He had a bad experience with rehab during COVID and does not want to return.                         Point:  Precautions (Done)       Description:   Instruct learner(s) on prescribed precautions during self-care and functional transfers.                  Learning Progress Summary             Patient Acceptance, E,TB, VU,NR by RE at 6/30/2024 1530    Comment: I discussed with the patient and his wife my recommendation of acute or subacute inpatient rehab.  He had a bad experience with rehab during COVID and does not want to return.   Significant Other Acceptance, E,TB, VU,NR by RE at 6/30/2024 1530    Comment: I discussed with the patient and his wife my recommendation of acute or subacute inpatient rehab.  He had a bad experience with rehab during COVID and does not want to return.                         Point: Body mechanics (Done)       Description:   Instruct learner(s) on proper positioning and spine alignment during self-care, functional mobility activities and/or exercises.                  Learning Progress Summary             Patient Acceptance, E,TB, VU,NR by RE at 6/30/2024 1530    Comment: I discussed with the patient and his wife my recommendation of acute or subacute inpatient rehab.  He had a bad experience with rehab during COVID and does not want to return.   Significant Other Acceptance, E,TB, VU,NR by RE at 6/30/2024 1530    Comment: I discussed with the patient and his wife my recommendation of acute or subacute inpatient rehab.  He had a bad experience with rehab during COVID and does not want to return.                                         User Key       Initials Effective Dates Name Provider Type Discipline    RE 06/16/21 -  Irma Bower OTR Occupational Therapist OT                  OT Recommendation and Plan  Planned Therapy Interventions (OT): adaptive equipment training, BADL retraining, patient/caregiver education/training, transfer/mobility retraining  Therapy Frequency (OT): 5 times/wk  Plan of Care Review  Plan of Care Reviewed With: patient, spouse  Progress: improving  Outcome  Evaluation: Patient is a 74-year-old man who presented to the hospital for treatment of an acute UTI.  Patient's wife reports that following his discharge from [facility] he returned home with home health.  Apparently, he fell several times and required EMS to assist to get up from the floor.  His wife was assisting with all ADLs including toileting.  Patient is unable to use a urinal independently.  Patient has a history of a prior CVA which resulted in minimal right hand and wrist function.  Patient uses his right upper extremity as an active assist.  He attempts to place the hand on the walker and uses tone/the finger contractures to .  Current deficits include decreased independence with bathroom transfers and decreased ability to perform all ADLs including toileting.  I had a long discussion with patient's wife regarding some sort of rehab.  Apparently, they had a bad experience with rehab during [facility] and she does not want him to go back.  I expressed my concern that he will not obtain enough independence with transfers and ADLs for it to be safe for him to be discharged home.  I highly recommended subacute rehab or acute rehab to increase independence with transfers and ADLs.  He will benefit from occupational therapy in the acute care setting to increase independence with ADLs and bathroom transfers.     Time Calculation:         Time Calculation- OT       Row Name 06/30/24 1534             Time Calculation- OT    OT Start Time 1345  -RE      OT Stop Time 1445  -RE      OT Time Calculation (min) 60 min  -RE      Total Timed Code Minutes- OT 45 minute(s)  -RE         Timed Charges    72543 - OT Self Care/Mgmt Minutes 45  -RE         Untimed Charges    OT Eval/Re-eval Minutes 15  -RE         Total Minutes    Timed Charges Total Minutes 45  -RE      Untimed Charges Total Minutes 15  -RE       Total Minutes 60  -RE                User Key  (r) = Recorded By, (t) = Taken By, (c) = Cosigned By       Initials Name Provider Type    RE Irma Bower OTR Occupational Therapist                  Therapy Charges for Today       Code Description Service Date Service Provider Modifiers Qty    [id number] HC OT SELF CARE/MGMT/TRAIN EA 15 MIN 6/30/2024 Irma Bower OTR GO 3    [id number]  OT EVAL LOW COMPLEXITY 2 6/30/2024 Irma Bower OTR GO 1          Dictated utilizing Dragon dictation:  Much of this encounter note is an electronic transcription/translation of spoken language to printed text. The electronic translation of spoken language may permit erroneous, or at times, nonsensical words or phrases to be inadvertently transcribed; Although I have reviewed the note for such errors, some may still exist.       ANURAG aMck  6/30/2024

## 2024-06-30 NOTE — PLAN OF CARE
Goal Outcome Evaluation:  Plan of Care Reviewed With: patient, spouse        Progress: improving  Outcome Evaluation: Patient is a 74-year-old man who presented to the hospital for treatment of an acute UTI.  Patient's wife reports that following his discharge from [facility] he returned home with home health.  Apparently, he fell several times and required EMS to assist to get up from the floor.  His wife was assisting with all ADLs including toileting.  Patient is unable to use a urinal independently.  Patient has a history of a prior CVA which resulted in minimal right hand and wrist function.  Patient uses his right upper extremity as an active assist.  He attempts to place the hand on the walker and uses tone/the finger contractures to .  Current deficits include decreased independence with bathroom transfers and decreased ability to perform all ADLs including toileting.  I had a long discussion with patient's wife regarding some sort of rehab.  Apparently, they had a bad experience with rehab during [facility] and she does not want him to go back.  I expressed my concern that he will not obtain enough independence with transfers and ADLs for it to be safe for him to be discharged home.  I highly recommended subacute rehab or acute rehab to increase independence with transfers and ADLs.  He will benefit from occupational therapy in the acute care setting to increase independence with ADLs and bathroom transfers.      Anticipated Discharge Disposition (OT): inpatient rehabilitation facility, sub acute care setting

## 2024-06-30 NOTE — THERAPY EVALUATION
Patient Name: Dilip Verma  : 1950    MRN: [id number]                              Today's Date: 2024       Admit Date: 2024    Visit Dx:     ICD-10-CM ICD-9-CM   1. Altered mental status, unspecified altered mental status type  R41.82 780.97   2. Acute UTI  N39.0 599.0   3. Multiple falls  R29.6 V15.88     Patient Active Problem List   Diagnosis    Anxiety    Colon polyp    Type 2 diabetes mellitus with hyperglycemia, with long-term current use of insulin    Erectile dysfunction    Hyperlipidemia    Type 2 acute myocardial infarction    CAD (coronary artery disease)    Hx of CABG    Chronic diastolic heart failure    Hypersomnia due to medical condition    CSA (central sleep apnea)    Periodic breathing    HTN (hypertension)    History of stroke    CKD (chronic kidney disease) stage 3, GFR 30-59 ml/min    Urinary retention    Acute on chronic renal failure    Acute UTI (urinary tract infection)    Acute on chronic diastolic (congestive) heart failure    Bacteriuria    Rectal pain    Status post total replacement of hip    Vitamin D deficiency    Post-acute COVID-19 syndrome    Insulin dose changed    Arthropathy associated with neurological disorder    Personal history of colonic polyps    Type 2 diabetes mellitus with diabetic neuropathy, with long-term current use of insulin    Cervical spinal stenosis    Abscess of skin    Overweight    Cervical stenosis of spine    Spinal stenosis, lumbar region, without neurogenic claudication    Medically noncompliant    Chronic heart failure with preserved ejection fraction (HFpEF)    Carotid stenosis    Peripheral arterial disease    Sepsis    Hyperkalemia    Metabolic encephalopathy    AMENA (acute kidney injury)    Paroxysmal atrial fibrillation     Past Medical History:   Diagnosis Date    AMENA (acute kidney injury) 2020    Alcoholism 1989    not since     CORI positive     Anemia     Anxiety     Ataxia     Atypical chest pain 2022     SEEN AT [facility] ER    Balance disorder     Carotid stenosis 03/28/2024    Cataract     BILATERAL, S/P EXTRACTION    Cervical radiculopathy 11/11/2019    SEEN AT  [facility] ER    Cervical spinal cord compression     Chronic diastolic (congestive) heart failure     Chronic kidney disease     STAGE 3, FOLLOWED BY DR. VIVAR    Chronic pancreatitis     Closed left subtrochanteric femur fracture 12/01/2020    ADMITTED TO [facility]    Closed nondisplaced intertrochanteric fracture of left femur 12/01/2020    ADMITTED TO [facility]    Colon polyps     FOLLOWED BY DR. AVTAR JEONG    Constipation     Contracture, right hand     Coronary artery disease     CABG 7/2019    COVID-19 07/2022    DDD (degenerative disc disease), cervical     Diabetes mellitus, type II     IDDM    Diabetic retinopathy     Difficulty walking     Dysphagia     Elevated brain natriuretic peptide (BNP) level 08/2014    Elevated LFTs 03/2021    Erectile dysfunction     Fissure, anal 2022    Foot drop     Fuchs' corneal dystrophy of right eye     Glaucoma     BILATERAL    History of alcohol abuse     Hyperlipidemia     Hypersomnia     Hypertension     Hypertensive urgency 02/25/2020    ADMITTED TO [facility]    Insomnia     Kidney stones     LV dysfunction 06/2016    Lyme disease     Lymphadenopathy syndrome 05/2021    Macular edema     BILATERAL    Myocardial infarction 11/05/2019    NSTEMI, ADMITTED TO [facility]    Myocardial infarction 07/12/2019    NSTEMI, ADMITTED TO [facility]    Neuropathy in diabetes     Non-celiac gluten sensitivity     Orthostasis     Osteoporosis     Oxygen dependent     PAD (peripheral artery disease)     Paroxysmal atrial fibrillation 06/06/2024    PNA (pneumonia) 06/2016    LEFT LOBE    Polyneuropathy     PTSD (post-traumatic stress disorder)     Pulmonary hypertension     Pulmonary nodule     Senile ectropion of both lower eyelids 02/2022    Sepsis 12/16/2020    D/T UTI, ADMITTED TO [facility]    Sleep apnea     STATES DOESN'T USE BIPAP OR CPAP    Spinal stenosis in  "cervical region     SEVERE-LIMITED ROM    Stroke 2012    \"slight stroke\"    Syncope and collapse 07/06/2019    ADMITTED TO [place]    Urinary retention 04/05/2021    SEEN AT [facility] ER    UTI (urinary tract infection)     Vision loss     Vitamin D deficiency      Past Surgical History:   Procedure Laterality Date    CARDIAC CATHETERIZATION N/A 07/15/2019    Procedure: Coronary angiography;  Surgeon: Carrie Price MD;  Location:  [facility];  Service: Cardiovascular    CARDIAC CATHETERIZATION N/A 07/15/2019    Procedure: Left Heart Cath;  Surgeon: Carrie Price MD;  Location:  [facility];  Service: Cardiovascular    CARDIAC CATHETERIZATION N/A 07/15/2019    Procedure: Left ventriculography;  Surgeon: Carrie Price MD;  Location:  [facility];  Service: Cardiovascular    CARDIAC CATHETERIZATION  07/15/2019    Procedure: Functional Flow [place];  Surgeon: Carrie Price MD;  Location:  [facility];  Service: Cardiovascular    CARDIAC CATHETERIZATION N/A 11/06/2019    Procedure: Right and Left Heart Cath;  Surgeon: Mya Smith MD;  Location:  [facility];  Service: Cardiovascular    CARDIAC CATHETERIZATION N/A 11/06/2019    Procedure: Coronary angiography;  Surgeon: Mya Smith MD;  Location:  [facility];  Service: Cardiovascular    CARDIAC SURGERY      CATARACT EXTRACTION Left 2014    CATARACT EXTRACTION Right 11/2016    PHACO/IOL, DR. LEN DENTON    COLONOSCOPY N/A 03/16/2023    ENTIRE COLON WNL, RESCOPE IN 5 YRS, DR. LINDA LIZARRAGA AT [facility]    COLONOSCOPY W/ POLYPECTOMY N/A 01/02/2015    A FEW DIVERTICULA IN SIGMOID, 6 MM TUBULOVILLOUS ADENOMA POLYP IN RECTUM, SMALL HEMORRHOIDS, MELANOSIS COLI, DR. AVTAR JEONG AT [facility]    CORONARY ARTERY BYPASS GRAFT N/A 07/18/2019    Procedure: INTRAOPERATIVE SHOAIB; STERNOTOMY CORONARY ARTERY BYPASS x 3  USING LEFT INTERNAL MAMMARY ARTERY GRAFT UTILIZING " ENDOSCOPICALLY HARVESTED RIGHT GREATER SAPHENOUS VEIN AND PRP.;  Surgeon: Bill Devi MD;  Location: [facility] MAIN OR;  Service: Cardiothoracic    CYSTOSCOPY BLADDER STONE LITHOTRIPSY N/A     DENTAL PROCEDURE Bilateral     3 surgeries inder implants    FEMUR IM NAILING/RODDING Left 12/03/2020    Procedure: LEFT HIP INTRAMEDULLARY NAIL;  Surgeon: Niraj Ravi MD;  Location: [facility] MAIN OR;  Service: Orthopedic Spine;  Laterality: Left;    INCISION AND DRAINAGE PERIRECTAL ABSCESS N/A 10/04/2023    Procedure: Incision and drainage of perianal and buttock abscess;  Surgeon: Herbie Villatoro MD;  Location: [facility] MAIN OR;  Service: General;  Laterality: N/A;    INGUINAL HERNIA REPAIR Bilateral     TOENAIL EXCISION  06/2022    TONSILLECTOMY Bilateral     TOTAL HIP ARTHROPLASTY REVISION Left 01/11/2022    Procedure: TOTAL HIP ARTHROPLASTY REVISION- POSTERIOR;  Surgeon: Jurgen Candelaria II, MD;  Location: [facility] MAIN OR;  Service: Orthopedics;  Laterality: Left;    VASECTOMY N/A       General Information       Row Name 06/30/24 1221          Physical Therapy Time and Intention    Document Type evaluation  -ZB     Mode of Treatment individual therapy;physical therapy  -ZB       Row Name 06/30/24 1221          General Information    Patient Profile Reviewed yes  -ZB     Prior Level of Function --  since recent discharge patient has required increasing assistance for ADLs and mobility  -ZB     Existing Precautions/Restrictions fall  -ZB     Barriers to Rehab none identified  -ZB       Row Name 06/30/24 1221          Living Environment    People in Home spouse  -ZB       Row Name 06/30/24 1221          Home Main Entrance    Number of Stairs, Main Entrance other (see comments)  ramp  -ZB       Row Name 06/30/24 1221          Stairs Within Home, Primary    Number of Stairs, Within Home, Primary none  -ZB       Row Name 06/30/24 1221          Cognition    Orientation Status (Cognition) oriented x 4  -ZB        Row Name 06/30/24 1221          Safety Issues, Functional Mobility    Safety Issues Affecting Function (Mobility) awareness of need for assistance;insight into deficits/self-awareness;judgment;positioning of assistive device;sequencing abilities  -ZB     Impairments Affecting Function (Mobility) balance;strength;endurance/activity tolerance  -ZB     Comment, Safety Issues/Impairments (Mobility) gait belt and non skid socks donned  -ZB               User Key  (r) = Recorded By, (t) = Taken By, (c) = Cosigned By      Initials Name Provider Type    ZB Gerald Jackson PT Physical Therapist                   Mobility       Row Name 06/30/24 1222          Bed Mobility    Bed Mobility supine-sit;sit-supine  -ZB     Supine-Sit [place] (Bed Mobility) moderate assist (50% patient effort);verbal cues  -ZB     Sit-Supine [place] (Bed Mobility) moderate assist (50% patient effort);verbal cues  -ZB     Assistive Device (Bed Mobility) head of bed elevated;bed rails  -ZB       Row Name 06/30/24 1222          Bed-Chair Transfer    Bed-Chair [place] (Transfers) moderate assist (50% patient effort);1 person assist;1 person to manage equipment;verbal cues  -ZB     Assistive Device (Bed-Chair Transfers) walker, front-wheeled  -ZB       Row Name 06/30/24 1222          Sit-Stand Transfer    Sit-Stand [place] (Transfers) moderate assist (50% patient effort);verbal cues  -ZB     Assistive Device (Sit-Stand Transfers) walker, front-wheeled  -ZB     Comment, (Sit-Stand Transfer) multiple attempts to initiate, performed x4 in total  -ZB       Row Name 06/30/24 1222          Gait/Stairs (Locomotion)    [place] Level (Gait) moderate assist (50% patient effort);1 person assist;1 person to manage equipment;verbal cues  -ZB     Assistive Device (Gait) walker, front-wheeled  -ZB     Distance in Feet (Gait) 3  -ZB     Deviations/Abnormal Patterns (Gait) bilateral deviations;farhana decreased;festinating/shuffling;gait  speed decreased;stride length decreased  -ZB     Bilateral Gait Deviations forward flexed posture;heel strike decreased  -ZB     [place] Level (Stairs) not tested  -ZB     Comment, (Gait/Stairs) patient very unsteady overall with gait demo'ing intermittent LOB to the left with poor AD management; requires physical assistance for AD management  -ZB               User Key  (r) = Recorded By, (t) = Taken By, (c) = Cosigned By      Initials Name Provider Type    ZB Gerald Jackson, PT Physical Therapist                   Obj/Interventions       Row Name 06/30/24 1224          Range of Motion Comprehensive    General Range of Motion bilateral lower extremity ROM WNL  -ZB       Row Name 06/30/24 1224          Strength Comprehensive (MMT)    Comment, General Manual Muscle Testing (MMT) Assessment gross B LE MMT at least 3+/5  -ZB       Row Name 06/30/24 1224          Motor Skills    Motor Skills functional endurance  -ZB     Functional Endurance Poor  -ZB       Row Name 06/30/24 1224          Balance    Balance Assessment sitting static balance;sitting dynamic balance;standing static balance;standing dynamic balance  -ZB     Static Sitting Balance standby assist  -ZB     Dynamic Sitting Balance standby assist;contact guard  -ZB     Position, Sitting Balance sitting edge of bed  -ZB     Static Standing Balance minimal assist  -ZB     Dynamic Standing Balance moderate assist  -ZB     Position/Device Used, Standing Balance supported;walker, front-wheeled  -ZB     Balance Interventions sitting;standing;sit to stand;supported;static;dynamic  -ZB               User Key  (r) = Recorded By, (t) = Taken By, (c) = Cosigned By      Initials Name Provider Type    ZB Gerald Jackson, PT Physical Therapist                   Goals/Plan       Row Name 06/30/24 1231          Bed Mobility Goal 1 (PT)    Activity/Assistive Device (Bed Mobility Goal 1, PT) bed mobility activities, all  -ZB     [place] Level/Cues Needed (Bed Mobility  Goal 1, PT) minimum assist (75% or more patient effort)  -ZB     Time Frame (Bed Mobility Goal 1, PT) short term goal (STG);1 week  -ZB       Row Name 06/30/24 1231          Transfer Goal 1 (PT)    Activity/Assistive Device (Transfer Goal 1, PT) sit-to-stand/stand-to-sit;bed-to-chair/chair-to-bed  -ZB     [place] Level/Cues Needed (Transfer Goal 1, PT) minimum assist (75% or more patient effort)  -ZB     Time Frame (Transfer Goal 1, PT) short term goal (STG);1 week  -ZB       Row Name 06/30/24 1231          Gait Training Goal 1 (PT)    Activity/Assistive Device (Gait Training Goal 1, PT) gait (walking locomotion);assistive device use  -ZB     [place] Level (Gait Training Goal 1, PT) minimum assist (75% or more patient effort)  -ZB     Distance (Gait Training Goal 1, PT) 75'  -ZB     Time Frame (Gait Training Goal 1, PT) short term goal (STG);1 week  -ZB       Row Name 06/30/24 1231          Therapy Assessment/Plan (PT)    Planned Therapy Interventions (PT) balance training;bed mobility training;gait training;home exercise program;strengthening;patient/family education;transfer training  -ZB               User Key  (r) = Recorded By, (t) = Taken By, (c) = Cosigned By      Initials Name Provider Type    ZB Gerald Jackson, PT Physical Therapist                   Clinical Impression       Row Name 06/30/24 1225          Pain    Pretreatment Pain Rating 3/10  -ZB     Posttreatment Pain Rating 3/10  -ZB     Pain Location - neck  -ZB     Pain Intervention(s) Ambulation/increased activity;Repositioned;Rest  -ZB       Row Name 06/30/24 1224          Plan of Care Review    Plan of Care Reviewed With patient;spouse  -ZB     Outcome Evaluation Patient is a 74 y.o. male admitted to [facility] with acute UTI. Patient recently admitted to this facility for an extended period of time. During previous admission PT recommendation was DC to SNF. [facility]. Extensive PMHx  listed in history. The patient lives with his spouse in a H with ramp to enter. Since recent admission the patient has required increased level of assistance with ADLs and mobility. Today he performed bed mobility modA, STS modA after multiple attempts to initiate, ambulated 3' forward/retro with modA + rwx, and performed BSC txfr with modA. The patient is overall very unsteady on his feet with tendency for LOB to the left. Given his current presentation, recommend patient DC to SNF prior to returning home for both patient and caregiver safety. PT will continue to monitor and progress as appropriate.  -ZB       Row Name 06/30/24 1225          Therapy Assessment/Plan (PT)    Rehab Potential (PT) good, to achieve stated therapy goals  -ZB     Criteria for Skilled Interventions Met (PT) yes  -ZB     Therapy Frequency (PT) 5 times/wk  -ZB       Row Name 06/30/24 1225          Positioning and Restraints    Pre-Treatment Position in bed  -ZB     Post Treatment Position bed  -ZB     In Bed notified nsg;fowlers;call light within reach;encouraged to call for assist;exit alarm on;with family/caregiver  -ZB               User Key  (r) = Recorded By, (t) = Taken By, (c) = Cosigned By      Initials Name Provider Type    ZB Gerald Jackson, PT Physical Therapist                   Outcome Measures       Row Name 06/30/24 1231          How much help from another person do you currently need...    Turning from your back to your side while in flat bed without using bedrails? 3  -ZB     Moving from lying on back to sitting on the side of a flat bed without bedrails? 3  -ZB     Moving to and from a bed to a chair (including a wheelchair)? 2  -ZB     Standing up from a chair using your arms (e.g., wheelchair, bedside chair)? 2  -ZB     Climbing 3-5 steps with a railing? 1  -ZB     To walk in hospital room? 2  -ZB     AM-PAC 6 Clicks Score (PT) 13  -ZB     Highest Level of Mobility Goal 4 --> Transfer to chair/commode  -ZB       Row Name  06/30/24 1231          Functional Assessment    Outcome Measure Options AM-PAC 6 Clicks Basic Mobility (PT)  -ZB               User Key  (r) = Recorded By, (t) = Taken By, (c) = Cosigned By      Initials Name Provider Type    Gerald Quiroz, PT Physical Therapist                                   PT Recommendation and Plan  Planned Therapy Interventions (PT): balance training, bed mobility training, gait training, home exercise program, strengthening, patient/family education, transfer training  Plan of Care Reviewed With: patient, spouse  Outcome Evaluation: Patient is a 74 y.o. male admitted to [facility] with acute UTI. Patient recently admitted to this facility for an extended period of time. During previous admission PT recommendation was DC to SNF. Per chart upon returning home at [facility] patient experienced multiple falls. Extensive PMHx listed in history. The patient lives with his spouse in a H with ramp to enter. Since recent admission the patient has required increased level of assistance with ADLs and mobility. Today he performed bed mobility modA, STS modA after multiple attempts to initiate, ambulated 3' forward/retro with modA + rwx, and performed BSC txfr with modA. The patient is overall very unsteady on his feet with tendency for LOB to the left. Given his current presentation, recommend patient DC to SNF prior to returning home for both patient and caregiver safety. PT will continue to monitor and progress as appropriate.     Time Calculation:         PT Charges       Row Name 06/30/24 1232             Time Calculation    Start Time 1115  -ZB      Stop Time 1149  -ZB      Time Calculation (min) 34 min  -ZB      PT Received On 06/30/24  -ZB      PT - Next Appointment 07/01/24  -ZB      PT Goal Re-Cert Due Date 07/14/24  -ZB         Time Calculation- PT    Total Timed Code Minutes- PT 19 minute(s)  -ZB         Timed Charges    07404 - PT Therapeutic Activity Minutes 19  -ZB         Total Minutes    Timed  Charges Total Minutes 19  -ZB       Total Minutes 19  -ZB                User Key  (r) = Recorded By, (t) = Taken By, (c) = Cosigned By      Initials Name Provider Type    Gerald Quiroz, SHEFALI Physical Therapist                  Therapy Charges for Today       Code Description Service Date Service Provider Modifiers Qty    68980919028 HC PT THERAPEUTIC ACT EA 15 MIN 6/30/2024 Gerald Jackson, PT GP 1    58658344355 HC PT EVAL MOD COMPLEXITY 3 6/30/2024 Gerald Jackson, PT GP 1            PT G-Codes  Outcome Measure Options: AM-PAC 6 Clicks Basic Mobility (PT)  AM-PAC 6 Clicks Score (PT): 13  PT Discharge Summary  Anticipated Discharge Disposition (PT): skilled nursing facility    Duyen Jackson PT  6/30/2024

## 2024-06-30 NOTE — PROGRESS NOTES
Name: Dilip Verma ADMIT: 2024   : 1950  PCP: Fernando Camargo DO    MRN: [id number] LOS: 0 days   AGE/SEX: 74 y.o. male  ROOM: [facility]     Subjective   Subjective   No new complaints.  Seems little more alert today.  Not confused.       Objective   Objective   Vital Signs  Temp:  [97.5 °F (36.4 °C)-98.8 °F (37.1 °C)] 98.2 °F (36.8 °C)  Heart Rate:  [54-68] 67  Resp:  [16-18] 16  BP: (113-149)/(52-64) 113/61  SpO2:  [91 %-97 %] 93 %  on  Flow (L/min):  [3-4] 3;   Device (Oxygen Therapy): nasal cannula  Body mass index is 28.59 kg/m².  Physical Exam  Vitals and nursing note reviewed.   Constitutional:       General: He is not in acute distress.  Cardiovascular:      Rate and Rhythm: Normal rate and regular rhythm.   Pulmonary:      Effort: Pulmonary effort is normal.      Breath sounds: Normal breath sounds.   Abdominal:      General: Bowel sounds are normal.      Palpations: Abdomen is soft.      Tenderness: There is no abdominal tenderness.   Musculoskeletal:         General: No swelling.   Skin:     General: Skin is warm and dry.   Neurological:      Mental Status: He is alert. Mental status is at baseline.   Psychiatric:         Attention and Perception: He is inattentive.       Results Review     I reviewed the patient's new clinical results.  Results from last 7 days   Lab Units 24  0737   WBC 10*3/mm3 8.36 8.60 9.04 6.69   HEMOGLOBIN g/dL 10.8* 10.4* 11.1* 10.0*   PLATELETS 10*3/mm3 222 211 243 207     Results from last 7 days   Lab Units 24  0737   SODIUM mmol/L 140 140 140 141   POTASSIUM mmol/L 4.0 3.7 3.8 4.1   CHLORIDE mmol/L 100 100 102 104   CO2 mmol/L 33.0* 28.6 28.0 31.0*   BUN mg/dL 36* 31* 34* 42*   CREATININE mg/dL 2.29* 1.95* 2.16* 2.63*   GLUCOSE mg/dL 270* 241* 170* 181*   EGFR mL/min/1.73 29.0* 35.2* 31.2* 24.6*     Results from last 7 days   Lab Units 24  8304   ALBUMIN  g/dL 3.4*   BILIRUBIN mg/dL 0.5   ALK PHOS U/L 92   AST (SGOT) U/L 18   ALT (SGPT) U/L 23     Results from last 7 days   Lab Units 06/30/24  0407 06/29/24  0321 06/28/24  1914 06/25/24  0737   CALCIUM mg/dL 8.7 8.4* 8.3* 8.2*   ALBUMIN g/dL  --   --  3.4*  --      Results from last 7 days   Lab Units 06/28/24  2150   LACTATE mmol/L 1.0     Hemoglobin A1C   Date/Time Value Ref Range Status   06/30/2024 0407 8.10 (H) 4.80 - 5.60 % Final     Glucose   Date/Time Value Ref Range Status   06/30/2024 0610 233 (H) 70 - 130 mg/dL Final   06/29/2024 2023 150 (H) 70 - 130 mg/dL Final   06/29/2024 1601 172 (H) 70 - 130 mg/dL Final   06/29/2024 1210 293 (H) 70 - 130 mg/dL Final   06/29/2024 0629 226 (H) 70 - 130 mg/dL Final   06/28/2024 2343 182 (H) 70 - 130 mg/dL Final       CT Head Without Contrast    Result Date: 6/28/2024   No evidence for acute traumatic intracranial pathology.    Radiation dose reduction techniques were utilized, including automated exposure control and exposure modulation based on body size.  This report was finalized on 6/28/2024 8:18 PM by Dr. Dario Yeboah M.D on Workstation: [id number]      XR Chest 1 View    Result Date: 6/28/2024  Likely atelectasis in the left mid to lower lung. Mild vascular congestion. Follow-up as clinical indications persist.  This report was finalized on 6/28/2024 7:58 PM by Dr. Gabe Zimmer M.D on Workstation: [id number]       I have personally reviewed all medications:  Scheduled Medications  amiodarone, 200 mg, Oral, Q24H  apixaban, 5 mg, Oral, Q12H  aspirin, 81 mg, Oral, Nightly  brimonidine, 1 drop, Both Eyes, BID  bumetanide, 1 mg, Oral, BID  cefTRIAXone, 1,000 mg, Intravenous, Q24H  DULoxetine, 30 mg, Oral, Nightly  guaiFENesin, 1,200 mg, Oral, Q12H  insulin glargine, 6 Units, Subcutaneous, Nightly  insulin lispro, 2-9 Units, Subcutaneous, 4x Daily AC & at Bedtime  ipratropium-albuterol, 3 mL, Nebulization, Q4H While Awake - RT  latanoprost, 1 drop, Both Eyes,  Nightly  metoprolol tartrate, 12.5 mg, Oral, Q12H  sodium chloride, 10 mL, Intravenous, Q12H  terazosin, 1 mg, Oral, Nightly  thiamine, 100 mg, Oral, Daily    Infusions   Diet  Diet: Cardiac, Diabetic; Healthy Heart (2-3 Na+); Consistent Carbohydrate; Fluid Consistency: Thin (IDDSI 0)    I have personally reviewed:  [x]  Laboratory   [x]  Microbiology   [x]  Radiology   [x]  EKG/Telemetry  [x]  Cardiology/Vascular   []  Pathology    []  Records       Assessment/Plan     Active Hospital Problems    Diagnosis  POA    **Acute UTI (urinary tract infection) [N39.0]  Yes    Paroxysmal atrial fibrillation [I48.0]  Yes    Metabolic encephalopathy [G93.41]  Yes    Peripheral arterial disease [I73.9]  Yes    CKD (chronic kidney disease) stage 3, GFR 30-59 ml/min [N18.30]  Yes    HTN (hypertension) [I10]  Yes    Chronic diastolic heart failure [I50.32]  Yes    CAD (coronary artery disease) [I25.10]  Yes      Resolved Hospital Problems   No resolved problems to display.       74 y.o. male admitted with Acute UTI (urinary tract infection).    Continue Rocephin for treatment of UTI.  Metabolic encephalopathy seemingly resolved.  Follow-up culture results.  ID consult pending.  Renal function relatively stable however creatinine up slightly today compared to yesterday.  Will have his nephrologist check in on him while he is here.    Anticipate he should be stable for discharge in 1 to 2 days.  PT and CCP to follow-up regarding discharge planning but seems he and his wife are set on returning home with home health.     Accelerated hypertension has now resolved.  Currently normotensive and will continue current regimen.    Hemoglobin A1c 8.1%.  Blood sugars mildly elevated.  Will go up slightly on basal insulin.    Continue incentive spirometry.  Wean oxygen.  Walking oximetry before discharge.      Eliquis (home med) for DVT prophylaxis.  Full code.  Discussed with patient and spouse.  Anticipate home with home health in 1-2  days.  Expected Discharge Date: 7/1/2024; Expected Discharge Time:       Lasha De La Rosa MD  [facility]  06/30/24  07:55 EDT

## 2024-06-30 NOTE — PLAN OF CARE
Goal Outcome Evaluation:  Plan of Care Reviewed With: patient, spouse           Outcome Evaluation: Patient is a 74 y.o. male admitted to [facility] with acute UTI. Patient recently admitted to this facility for an extended period of time. During previous admission PT recommendation was DC to SNF. Per chart upon returning home at NH patient experienced multiple falls. Extensive PMHx listed in history. The patient lives with his spouse in a H with ramp to enter. Since recent admission the patient has required increased level of assistance with ADLs and mobility. Today he performed bed mobility modA, STS modA after multiple attempts to initiate, ambulated 3' forward/retro with modA + rwx, and performed BSC txfr with modA. The patient is overall very unsteady on his feet with tendency for LOB to the left. Given his current presentation, recommend patient DC to SNF prior to returning home for both patient and caregiver safety. PT will continue to monitor and progress as appropriate.      Anticipated Discharge Disposition (PT): skilled nursing facility

## 2024-06-30 NOTE — CONSULTS
INITIAL CONSULT NOTE      Patient Name: Dilip Verma  : 1950  MRN: [id number]  Primary Care Physician: Fernando Camargo DO  Date of admission: 2024    Patient Care Team:  Fernando Camargo DO as PCP - General (Family Medicine)  Morris Cai MD as Consulting Physician (Sleep Medicine)  Michaela Hylton MD as Consulting Physician (Cardiology)  Hardy Banerjee MD as Consulting Physician (Ophthalmology)  Chula Christianson MD as Consulting Physician (Ophthalmology)  Jason Sherman MD (Endocrinology)  Perla Mayen MD as Consulting Physician (Nephrology)  Federico Hebert MD as Consulting Physician (Pulmonary Disease)  Niraj Ravi MD as Consulting Physician (Orthopedic Surgery)  Papa Velasco MD as Consulting Physician (Urology)  Terese Yost MD as Consulting Physician (Gastroenterology)  Aracelis Ball MD as Consulting Physician (Colon and Rectal Surgery)  [facility] [place] as Primary Care Provider        Reason for Consult:       CKD III, recent AMENA requiring HD    Subjective   History of Present Illness:   Chief Complaint:   Chief Complaint   Patient presents with   • Altered Mental Status     HISTORY:  Dilip Verma is a 74 y.o. male with past medical history of AMENA requiring brief HD, CKD stage III with baseline sCr ~0.9-1.2 mg/dL, heart failure with preserved ejection fraction, CAD, hypertension, T2DM. Last TTE 10/5/22 with EF 66%, mild concentric LVH, grade II DD.     Patient recently discharged from [facility] on 24, admitted with AMS, treated for UTI, LLE cellulitis, Afib with RVR, severe encephalopathy thought to be related to cefepime. Our group followed for AMENA likely prerenal ATN 2/2 urosepsis with hemodynamic changes, also with possible CR component. He was initiated on dialysis on 6/15 due to worsening renal function, last dialysis performed on , dialysis catheter removed on 24, no further need for dialysis, discharged on 24,  was recommended to go to rehab but went home with family instead. Renal function on discharge with sCr 2.63.    Patient now returns to [facility] [place] s/p  fall at home requiring EMS to pick him up, seemed confused upon arrival of EMS, taken to ED for further work up. BP has been extremely labile since admission with trends 1113//100, CXR with atelectasis. CT head WO shows no acute findings. Labs significant for sCr 2.16 which is improved from recent discharge, BUN 34, lactate 1.0, UA with moderate leukocytes, 21-50 WBCs, no proteinuria or hematuria, 0-2 hyaline casts, urine cultures in progress. Patient admitted for UTI with probable metabolic encephalopathy, on empiric antibiotics. Nephrology consulted for CKD III with recent AMENA requiring HD.      Review of systems:  Constitutional: No fever, no chills, no lethargy, no weakness.  HEENT:  No headache, otalgia, itchy eyes, nasal discharge or sore throat.  Cardiac:  No chest pain, dyspnea, orthopnea or PND.  Chest:              No cough, phlegm or wheezing.  Abdomen:  No abdominal pain, nausea or vomiting.  Neuro:  No focal weakness, abnormal movements orseizure like activity.  :   No hematuria, no pyuria, no dysuria, no flank pain.  ROS was otherwise negative except as mentioned in the [place].       Personal History:     Past Medical History:   Past Medical History:   Diagnosis Date   • AMENA (acute kidney injury) 12/03/2020   • Alcoholism 1989    not since 1998   • CORI positive    • Anemia    • Anxiety    • Ataxia    • Atypical chest pain 04/19/2022    SEEN AT [facility]   • Balance disorder    • Carotid stenosis 03/28/2024   • Cataract     BILATERAL, S/P EXTRACTION   • Cervical radiculopathy 11/11/2019    SEEN AT  [facility] ER   • Cervical spinal cord compression    • Chronic diastolic (congestive) heart failure    • Chronic kidney disease     STAGE 3, FOLLOWED BY DR. VIVAR   • Chronic pancreatitis    • Closed left subtrochanteric femur fracture 12/01/2020    ADMITTED TO  "[facility] with [facility]   • Closed nondisplaced intertrochanteric fracture of left femur 12/01/2020    ADMITTED TO [facility]   • Colon polyps     FOLLOWED BY DR. AVTAR JEONG   • Constipation    • Contracture, right hand    • Coronary artery disease     CABG 7/2019   • COVID-19 07/2022   • DDD (degenerative disc disease), cervical    • Diabetes mellitus, type II     IDDM   • Diabetic retinopathy    • Difficulty walking    • Dysphagia    • Elevated brain natriuretic peptide (BNP) level 08/2014   • Elevated LFTs 03/2021   • Erectile dysfunction    • Fissure, anal 2022   • Foot drop    • Fuchs' corneal dystrophy of right eye    • Glaucoma     BILATERAL   • History of alcohol abuse    • Hyperlipidemia    • Hypersomnia    • Hypertension    • Hypertensive urgency 02/25/2020    ADMITTED TO [facility]   • Insomnia    • Kidney stones    • LV dysfunction 06/2016   • Lyme disease    • Lymphadenopathy syndrome 05/2021   • Macular edema     BILATERAL   • Myocardial infarction 11/05/2019    NSTEMI, ADMITTED TO [facility]   • Myocardial infarction 07/12/2019    NSTEMI, ADMITTED TO [facility]   • Neuropathy in diabetes    • Non-celiac gluten sensitivity    • Orthostasis    • Osteoporosis    • Oxygen dependent    • PAD (peripheral artery disease)    • Paroxysmal atrial fibrillation 06/06/2024   • PNA (pneumonia) 06/2016    LEFT LOBE   • Polyneuropathy    • PTSD (post-traumatic stress disorder)    • Pulmonary hypertension    • Pulmonary nodule    • Senile ectropion of both lower eyelids 02/2022   • Sepsis 12/16/2020    D/T UTI, ADMITTED TO [facility]   • Sleep apnea     STATES DOESN'T USE BIPAP OR CPAP   • Spinal stenosis in cervical region     SEVERE-LIMITED ROM   • Stroke 2012    \"slight stroke\"   • Syncope and collapse 07/06/2019    ADMITTED TO [facility]   • Urinary retention 04/05/2021    SEEN AT [facility] ER   • UTI (urinary tract infection)    • Vision loss    • Vitamin D deficiency        Surgical History:      Past Surgical History:   Procedure Laterality Date   • CARDIAC CATHETERIZATION " N/A 07/15/2019    Procedure: Coronary angiography;  Surgeon: Carrie Prcie MD;  Location:  [facility]AS[facility];  Service: Cardiovascular   • CARDIAC CATHETERIZATION N/A 07/15/2019    Procedure: Left Heart Cath;  Surgeon: Carrie Price MD;  Location:  [facility];  Service: Cardiovascular   • CARDIAC CATHETERIZATION N/A 07/15/2019    Procedure: Left ventriculography;  Surgeon: Carrie Price MD;  Location:  [facility];  Service: Cardiovascular   • CARDIAC CATHETERIZATION  07/15/2019    Procedure: Functional Flow [place];  Surgeon: Carrie Price MD;  Location:  [facility];  Service: Cardiovascular   • CARDIAC CATHETERIZATION N/A 11/06/2019    Procedure: Right and Left Heart Cath;  Surgeon: Mya Smith MD;  Location:  [facility];  Service: Cardiovascular   • CARDIAC CATHETERIZATION N/A 11/06/2019    Procedure: Coronary angiography;  Surgeon: Mya Smith MD;  Location: [facility] C[facility];  Service: Cardiovascular   • CARDIAC SURGERY     • CATARACT EXTRACTION Left 2014   • CATARACT EXTRACTION Right 11/2016    PHACO/IOL, DR. LEN DENTON   • COLONOSCOPY N/A 03/16/2023    ENTIRE COLON WNL, RESCOPE IN 5 YRS, DR. LINDA LIZARRAGA AT [facility]   • COLONOSCOPY W/ POLYPECTOMY N/A 01/02/2015    A FEW DIVERTICULA IN SIGMOID, 6 MM TUBULOVILLOUS ADENOMA POLYP IN RECTUM, SMALL HEMORRHOIDS, MELANOSIS COLI, DR. AVTAR JEONG AT [facility]   • CORONARY ARTERY BYPASS GRAFT N/A 07/18/2019    Procedure: INTRAOPERATIVE SHOAIB; STERNOTOMY CORONARY ARTERY BYPASS x 3  USING LEFT INTERNAL MAMMARY ARTERY GRAFT UTILIZING ENDOSCOPICALLY HARVESTED RIGHT GREATER SAPHENOUS VEIN AND PRP.;  Surgeon: Bill Devi MD;  Location: [facility] MAIN OR;  Service: Cardiothoracic   • CYSTOSCOPY BLADDER STONE LITHOTRIPSY N/A    • DENTAL PROCEDURE Bilateral     3 surgeries inder implants   • FEMUR IM NAILING/RODDING Left 12/03/2020    Procedure: LEFT HIP  INTRAMEDULLARY NAIL;  Surgeon: Niraj Ravi MD;  Location: [facility] MAIN OR;  Service: Orthopedic Spine;  Laterality: Left;   • INCISION AND DRAINAGE PERIRECTAL ABSCESS N/A 10/04/2023    Procedure: Incision and drainage of perianal and buttock abscess;  Surgeon: Herbie Villatoro MD;  Location: [facility] MAIN OR;  Service: General;  Laterality: N/A;   • INGUINAL HERNIA REPAIR Bilateral    • TOENAIL EXCISION  06/2022   • TONSILLECTOMY Bilateral    • TOTAL HIP ARTHROPLASTY REVISION Left 01/11/2022    Procedure: TOTAL HIP ARTHROPLASTY REVISION- POSTERIOR;  Surgeon: Jurgen Candelaria II, MD;  Location: [facility] OR;  Service: Orthopedics;  Laterality: Left;   • VASECTOMY N/A        Family History: family history includes Alcohol abuse in his brother and paternal grandfather; Arrhythmia in his mother; Cancer in his mother; Depression in his mother and sister; Glaucoma in his maternal grandmother; Heart attack in his paternal grandmother; Heart disease in his father and mother; Heart failure in his father; Hyperlipidemia in his father and mother; Hypertension in his father and mother; Kidney disease in his father; Lung disease in his paternal uncle; Mental illness in his mother; Migraines in his mother; Stroke in his maternal grandmother and paternal grandmother; Thyroid disease in his father and paternal uncle; Uterine cancer in his mother. Otherwise pertinent FHx was reviewed and unremarkable.     Social History:  reports that he quit smoking about 24 years ago. His smoking use included cigarettes. He started smoking about 40 years ago. He has a 12 pack-year smoking history. He has been exposed to tobacco smoke. He has never used smokeless tobacco. He reports that he does not currently use alcohol. He reports that he does not use drugs.    Medications:  Prior to Admission medications    Medication Sig Start Date End Date Taking? Authorizing Provider   amiodarone (PACERONE) 200 MG tablet Take 1 tablet by  mouth Daily. 6/27/24  Yes Danii Dennison APRN   apixaban (ELIQUIS) 5 MG tablet tablet Take 1 tablet by mouth Every 12 (Twelve) Hours. Indications: Atrial Fibrillation 6/27/24  Yes Danii Dennison APRN   aspirin 81 MG EC tablet Take 1 tablet by mouth Every Night.   Yes Heri Rinaldi MD   brimonidine (ALPHAGAN) 0.2 % ophthalmic solution Administer 1 drop to both eyes 2 (Two) Times a Day.   Yes Heri Rinaldi MD   bumetanide (BUMEX) 1 MG tablet Take 1 tablet by mouth 2 (Two) Times a Day. 6/27/24  Yes Danii Dennison APRN   Cholecalciferol (Vitamin D-3) 125 MCG (5000 UT) tablet Take 1 tablet by mouth Daily.   Yes Heri Rinaldi MD   DULoxetine (CYMBALTA) 30 MG capsule Take 1 capsule by mouth Every Night. 1/16/24  Yes Heri Rinaldi MD   hydrocortisone 1 % cream Apply 1 Application topically to the appropriate area as directed 2 (Two) Times a Day. 6/25/24  Yes Nabila Flynn MD   Insulin Glargine, 2 Unit Dial, (TOUJEO) 300 UNIT/ML solution pen-injector injection Inject 10 Units under the skin into the appropriate area as directed Daily. 6/25/24  Yes Nabila Flynn MD   insulin lispro (humaLOG) 100 UNIT/ML injection Inject 0-14 Units under the skin into the appropriate area as directed 3 (Three) Times a Day Before Meals.  Patient taking differently: Inject 0-14 Units under the skin into the appropriate area as directed 3 (Three) Times a Day Before Meals. SLIDING SCALE  100-150 - 4 units  151-200 - 5 units  201-250 - 6 units  251-300 - 7 units  301-350 - 8 units  351-400 - 9 units  401+ - 10 units 12/7/20  Yes Amador Valverde MD   latanoprost (XALATAN) 0.005 % ophthalmic solution Administer 1 drop to both eyes every night at bedtime. 1/16/23  Yes Heri Rinaldi MD   Magnesium 400 MG capsule Take 400 mg by mouth As Needed.   Yes Heri Rinaldi MD   metoprolol tartrate (LOPRESSOR) 25 MG tablet Take 0.5 tablets by mouth Every 12 (Twelve) Hours. 6/27/24  Yes Danii Dennison, APRN    multivitamin with minerals (MULTIVITAMIN ADULTS PO) Take 1 tablet by mouth Daily.   Yes Heri Rinaldi MD   terazosin (HYTRIN) 1 MG capsule Take 1 capsule by mouth Every Night. 6/25/24  Yes Nabila Flynn MD   testosterone (ANDROGEL) 25 MG/2.5GM (1%) gel gel Place 25 mg on the skin as directed by provider 2 (Two) Times a Week.   Yes Heri Rinaldi MD   thiamine (VITAMIN B1) 100 MG tablet Take 1 tablet by mouth Daily. 6/26/24  Yes Nabila Flynn MD   traMADol (ULTRAM) 50 MG tablet Take 1 tablet by mouth At Night As Needed. 10/16/23  Yes Heri Rinaldi MD   sildenafil (REVATIO) 20 MG tablet Take 1 tablet by mouth As Needed.    ProviderHeri MD     Scheduled Meds:amiodarone, 200 mg, Oral, Q24H  apixaban, 5 mg, Oral, Q12H  aspirin, 81 mg, Oral, Nightly  brimonidine, 1 drop, Both Eyes, BID  bumetanide, 1 mg, Oral, BID  cefTRIAXone, 1,000 mg, Intravenous, Q24H  DULoxetine, 30 mg, Oral, Nightly  guaiFENesin, 1,200 mg, Oral, Q12H  insulin glargine, 10 Units, Subcutaneous, Nightly  insulin lispro, 2-9 Units, Subcutaneous, 4x Daily AC & at Bedtime  ipratropium-albuterol, 3 mL, Nebulization, Q4H While Awake - RT  latanoprost, 1 drop, Both Eyes, Nightly  metoprolol tartrate, 12.5 mg, Oral, Q12H  sodium chloride, 10 mL, Intravenous, Q12H  terazosin, 1 mg, Oral, Nightly  thiamine, 100 mg, Oral, Daily      Continuous Infusions:   PRN Meds:•  acetaminophen **OR** [DISCONTINUED] acetaminophen **OR** [DISCONTINUED] acetaminophen  •  senna-docusate sodium **AND** polyethylene glycol **AND** bisacodyl **AND** bisacodyl  •  dextrose  •  dextrose  •  glucagon (human recombinant)  •  melatonin  •  ondansetron ODT **OR** ondansetron  •  sodium chloride  •  sodium chloride  Allergies:    Allergies   Allergen Reactions   • Ace Inhibitors Angioedema   • Angiotensin Receptor Blockers Angioedema and Unknown (See Comments)     Angioneurotic edema   • Dapagliflozin Other (See Comments)     UTI,  rectal abcess   •  Losartan Angioedema     Angioneurotic edema   • Seroquel [Quetiapine] Hallucinations   • Xanax [Alprazolam] Unknown - High Severity   • Amlodipine Swelling   • Ativan [Lorazepam] Hallucinations   • Baclofen Hallucinations   • Misc. Sulfonamide Containing Compounds Unknown (See Comments)   • Minoxidil Confusion   • Tetracycline Rash     bliisters in mouth         Objective   Exam:     Vital Signs  Temp:  [97.5 °F (36.4 °C)-98.8 °F (37.1 °C)] 98.2 °F (36.8 °C)  Heart Rate:  [54-68] 65  Resp:  [16-18] 18  BP: (113-149)/(52-64) 113/61  SpO2:  [91 %-100 %] 100 %  on  Flow (L/min):  [3-4] 3;   Device (Oxygen Therapy): nasal cannula  Body mass index is 28.59 kg/m².  EXAM  General:  Sleepy, in no acute distress.    Head:      Normocephalic and atraumatic.    Eyes:      PERRL/EOM intact, conjunctivae and sclerae clear without nystagmus.    Neck:      No masses, thyromegaly,  trachea central   Lungs:    Clear bilaterally to auscultation.    Heart:      Regular rate and rhythm, no murmur no gallop  Abd:        Soft, nontender, not distended, bowel sounds positive, no shifting dullness.  Msk:        No deformity or scoliosis noted of thoracic or lumbar spine.    Pulses:   Pulses normal in all 4 extremities.    Extremities:        No cyanosis or clubbing--no edema.    Neuro:    No focal deficits.   alert oriented x3  Skin:       Intact without lesions or rashes.    Psych:    Some confusion    Results Review:  I have personally reviewed most recent Data :  BMP @LABRCNT(creatinine:10)  CBC    Results from last 7 days   Lab Units 06/30/24  0407 06/29/24  0321 06/28/24 1914 06/25/24  0737   WBC 10*3/mm3 8.36 8.60 9.04 6.69   HEMOGLOBIN g/dL 10.8* 10.4* 11.1* 10.0*   PLATELETS 10*3/mm3 222 211 243 207     CMP   Results from last 7 days   Lab Units 06/30/24  0407 06/29/24  0321 06/28/24 1914 06/25/24  0737 06/24/24  0640   SODIUM mmol/L 140 140 140 141 138   POTASSIUM mmol/L 4.0 3.7 3.8 4.1 4.5   CHLORIDE mmol/L 100 100 102 104  101   CO2 mmol/L 33.0* 28.6 28.0 31.0* 29.1*   BUN mg/dL 36* 31* 34* 42* 42*   CREATININE mg/dL 2.29* 1.95* 2.16* 2.63* 3.11*   GLUCOSE mg/dL 270* 241* 170* 181* 177*   ALBUMIN g/dL  --   --  3.4*  --   --    BILIRUBIN mg/dL  --   --  0.5  --   --    ALK PHOS U/L  --   --  92  --   --    AST (SGOT) U/L  --   --  18  --   --    ALT (SGPT) U/L  --   --  23  --   --      ABG      CT Head Without Contrast    Result Date: 6/28/2024   No evidence for acute traumatic intracranial pathology.    Radiation dose reduction techniques were utilized, including automated exposure control and exposure modulation based on body size.  This report was finalized on 6/28/2024 8:18 PM by Dr. Dario Yeboah M.D on Workstation: [employer]      XR Chest 1 View    Result Date: 6/28/2024  Likely atelectasis in the left mid to lower lung. Mild vascular congestion. Follow-up as clinical indications persist.  This report was finalized on 6/28/2024 7:58 PM by Dr. Gabe Zimmer M.D on Workstation: [email or web address]       Results for orders placed during the hospital encounter of 05/30/24    Adult Transthoracic Echo Complete W/ Cont if Necessary Per Protocol    Interpretation Summary  •  Left ventricular systolic function is hyperdynamic (EF > 70%). Calculated left ventricular EF = 75% Global longitudinal LV strain (GLS) = -17.8%. Left ventricle strain data was reviewed by the physician and found to be accurate. Global longitudinal LV strain is normal however there is regional abnormality with apical sparing suggestive of amyloid heart disease. However there is also basal ptal hypertrophy suggestive of hypertrophic cardiomyopathy. Wall motion abnormality is also noted in the basal septum and cannot rule out ischemic component.Consider additional imaging such as cardiac MRI and further evaluation also for amyloid heart disease as clinically indicated. The left ventricular cavity is small in size. Left ventricular wall thickness is consistent with  moderate to severe septal asymmetric hypertrophy. There is hypokinesis of the left ventricular basal septum. Left ventricular diastolic function is consistent with (grade II w/high LAP) pseudonormalization.  •  The left atrial cavity is mildly dilated.  •  No aortic valve regurgitation or stenosis is present. The aortic valve is abnormal in structure. There is mild thickening of the aortic valve.  •  There is mild, bileaflet mitral valve thickening present. Trace mitral valve regurgitation is present. No significant mitral valve stenosis is present.  •  Moderate tricuspid valve regurgitation is present. Estimated right ventricular systolic pressure from tricuspid regurgitation is markedly elevated (>55 mmHg). Calculated right ventricular systolic pressure from tricuspid regurgitation is 74 mmHg.        Assessment & Plan   Assessment and Plan:         Acute UTI (urinary tract infection)    CAD (coronary artery disease)    Chronic diastolic heart failure    HTN (hypertension)    CKD (chronic kidney disease) stage 3, GFR 30-59 ml/min    Peripheral arterial disease    Metabolic encephalopathy    Paroxysmal atrial fibrillation    ASSESSMENT:  Recent AMENA earlier this month 6/2024 requiring HD, last HD on 6/17, sCr on recent discharge was 2.63, now sCr 2.29.   CKD III etiology likely diabetic and hypertensive nephropathy with baseline sCr ~0.9-1.2 mg/dL  Metabolic alkalosis  HTN, trends labile  UTI  Likely metabolic encephalopathy  Heart failure with preserved ejection fraction  CAD  T2DM  Anemia    Last TTE 10/5/22 with EF 66%, mild concentric LVH, grade II DD.       PLAN :     Recent AMENA requiring HD on 6/15 and 6/17, has been off HD since, sCr on discharge 2.63, now sCr 2.29, improving  Baseline CKD III, previous baseline sCr 0.9-1.2  UA with moderate leukocytes, 21-50 WBCs, no proteinuria or hematuria, 0-2 hyaline casts. Will quantify UPCR  Possible UTI, on rocephin, follow cultures  BP trends extremely labile on  metoprolol, terazosin, monitor  On bumex 1mg po BID, underlying CHF, CXR with mild vascular congestion, continue for now, volume to me looks better, he is also eating better, monitor kidney function tomorrow, if kidney function is going higher, might have to cut down daily,  Will check BNP  Mild metabolic alkalosis, monitor  Anemia, transfuse for hgb <7.0  Strict I&O's  Daily labs, monitor renal function closely  Avoid nephrotoxic medications including NSAIDs  We will follow and coordinate with team    Thank you for this consultation!    Note transcribed for LEIDY Manley  [facility]  6/30/2024  10:02 EDT    Attestation Statement:   I personally have examined the patient and interviewed the patient. I have reviewed the history, data, problems, assessment and plan with the nurse practitioner during rounds. and I concur with the history. exam, asssesment and plan as described in the Progress note, with comments additions, revisions as noted.      Jonatan Minor MD  [facility]

## 2024-07-01 ENCOUNTER — READMISSION MANAGEMENT (OUTPATIENT)
Dept: CALL CENTER | Facility: HOSPITAL | Age: 74
End: 2024-07-01
Payer: MEDICARE

## 2024-07-01 VITALS
BODY MASS INDEX: 28.49 KG/M2 | HEART RATE: 72 BPM | HEIGHT: 68 IN | TEMPERATURE: 98.1 F | RESPIRATION RATE: 20 BRPM | OXYGEN SATURATION: 98 % | SYSTOLIC BLOOD PRESSURE: 132 MMHG | WEIGHT: 188 LBS | DIASTOLIC BLOOD PRESSURE: 57 MMHG

## 2024-07-01 LAB
ANION GAP SERPL CALCULATED.3IONS-SCNC: 9.1 MMOL/L (ref 5–15)
BUN SERPL-MCNC: 34 MG/DL (ref 8–23)
BUN/CREAT SERPL: 16.5 (ref 7–25)
CALCIUM SPEC-SCNC: 8.6 MG/DL (ref 8.6–10.5)
CHLORIDE SERPL-SCNC: 98 MMOL/L (ref 98–107)
CO2 SERPL-SCNC: 31.9 MMOL/L (ref 22–29)
CREAT SERPL-MCNC: 2.06 MG/DL (ref 0.76–1.27)
DEPRECATED RDW RBC AUTO: 43.2 FL (ref 37–54)
EGFRCR SERPLBLD CKD-EPI 2021: 33 ML/MIN/1.73
ERYTHROCYTE [DISTWIDTH] IN BLOOD BY AUTOMATED COUNT: 12.6 % (ref 12.3–15.4)
GLUCOSE BLDC GLUCOMTR-MCNC: 216 MG/DL (ref 70–130)
GLUCOSE BLDC GLUCOMTR-MCNC: 218 MG/DL (ref 70–130)
GLUCOSE SERPL-MCNC: 222 MG/DL (ref 65–99)
HCT VFR BLD AUTO: 33.3 % (ref 37.5–51)
HGB BLD-MCNC: 10.9 G/DL (ref 13–17.7)
MCH RBC QN AUTO: 31.1 PG (ref 26.6–33)
MCHC RBC AUTO-ENTMCNC: 32.7 G/DL (ref 31.5–35.7)
MCV RBC AUTO: 95.1 FL (ref 79–97)
PLATELET # BLD AUTO: 213 10*3/MM3 (ref 140–450)
PMV BLD AUTO: 10.5 FL (ref 6–12)
POTASSIUM SERPL-SCNC: 3.8 MMOL/L (ref 3.5–5.2)
RBC # BLD AUTO: 3.5 10*6/MM3 (ref 4.14–5.8)
SODIUM SERPL-SCNC: 139 MMOL/L (ref 136–145)
WBC NRBC COR # BLD AUTO: 8.42 10*3/MM3 (ref 3.4–10.8)

## 2024-07-01 PROCEDURE — 85027 COMPLETE CBC AUTOMATED: CPT | Performed by: HOSPITALIST

## 2024-07-01 PROCEDURE — 82948 REAGENT STRIP/BLOOD GLUCOSE: CPT

## 2024-07-01 PROCEDURE — 94799 UNLISTED PULMONARY SVC/PX: CPT

## 2024-07-01 PROCEDURE — 63710000001 INSULIN LISPRO (HUMAN) PER 5 UNITS: Performed by: HOSPITALIST

## 2024-07-01 PROCEDURE — 80048 BASIC METABOLIC PNL TOTAL CA: CPT

## 2024-07-01 PROCEDURE — 94664 DEMO&/EVAL PT USE INHALER: CPT

## 2024-07-01 PROCEDURE — G0378 HOSPITAL OBSERVATION PER HR: HCPCS

## 2024-07-01 PROCEDURE — 97530 THERAPEUTIC ACTIVITIES: CPT

## 2024-07-01 PROCEDURE — 94761 N-INVAS EAR/PLS OXIMETRY MLT: CPT

## 2024-07-01 RX ORDER — CEFDINIR 300 MG/1
300 CAPSULE ORAL EVERY 12 HOURS SCHEDULED
Qty: 6 CAPSULE | Refills: 0 | Status: DISCONTINUED | OUTPATIENT
Start: 2024-07-01 | End: 2024-07-01 | Stop reason: HOSPADM

## 2024-07-01 RX ORDER — CEFDINIR 300 MG/1
300 CAPSULE ORAL EVERY 12 HOURS SCHEDULED
Qty: 6 CAPSULE | Refills: 0 | Status: SHIPPED | OUTPATIENT
Start: 2024-07-01 | End: 2024-07-05

## 2024-07-01 RX ADMIN — IPRATROPIUM BROMIDE AND ALBUTEROL SULFATE 3 ML: .5; 3 SOLUTION RESPIRATORY (INHALATION) at 14:46

## 2024-07-01 RX ADMIN — INSULIN LISPRO 4 UNITS: 100 INJECTION, SOLUTION INTRAVENOUS; SUBCUTANEOUS at 11:36

## 2024-07-01 RX ADMIN — CEFDINIR 300 MG: 300 CAPSULE ORAL at 11:36

## 2024-07-01 RX ADMIN — IPRATROPIUM BROMIDE AND ALBUTEROL SULFATE 3 ML: .5; 3 SOLUTION RESPIRATORY (INHALATION) at 11:11

## 2024-07-01 RX ADMIN — BUMETANIDE 1 MG: 1 TABLET ORAL at 08:29

## 2024-07-01 RX ADMIN — BRIMONIDINE TARTRATE 1 DROP: 2 SOLUTION OPHTHALMIC at 08:31

## 2024-07-01 RX ADMIN — Medication 100 MG: at 11:36

## 2024-07-01 RX ADMIN — IPRATROPIUM BROMIDE AND ALBUTEROL SULFATE 3 ML: .5; 3 SOLUTION RESPIRATORY (INHALATION) at 06:46

## 2024-07-01 RX ADMIN — APIXABAN 5 MG: 5 TABLET, FILM COATED ORAL at 08:29

## 2024-07-01 RX ADMIN — AMIODARONE HYDROCHLORIDE 200 MG: 200 TABLET ORAL at 08:30

## 2024-07-01 RX ADMIN — Medication 10 ML: at 08:31

## 2024-07-01 RX ADMIN — GUAIFENESIN 1200 MG: 600 TABLET, EXTENDED RELEASE ORAL at 08:29

## 2024-07-01 RX ADMIN — INSULIN LISPRO 4 UNITS: 100 INJECTION, SOLUTION INTRAVENOUS; SUBCUTANEOUS at 08:30

## 2024-07-01 NOTE — PROGRESS NOTES
"  Infectious Diseases Progress Note    Tino Nagel MD     [facility]  [place]: 0 days  Patient Identification:  Name: Dilip Verma  Age: 74 y.o.  Sex: male  :  1950  MRN: [id number]         Primary Care Physician: Fernando Camargo DO        Subjective: More awake interactive.  Not as confused.  Feeling better.  Interval History: Has been on 3 days of ceftriaxone with all cultures negative.    Objective:    Scheduled Meds:amiodarone, 200 mg, Oral, Q24H  apixaban, 5 mg, Oral, Q12H  aspirin, 81 mg, Oral, Nightly  brimonidine, 1 drop, Both Eyes, BID  bumetanide, 1 mg, Oral, BID  cefTRIAXone, 1,000 mg, Intravenous, Q24H  DULoxetine, 30 mg, Oral, Nightly  guaiFENesin, 1,200 mg, Oral, Q12H  insulin glargine, 10 Units, Subcutaneous, Nightly  insulin lispro, 2-9 Units, Subcutaneous, 4x Daily AC & at Bedtime  ipratropium-albuterol, 3 mL, Nebulization, Q4H While Awake - RT  latanoprost, 1 drop, Both Eyes, Nightly  metoprolol tartrate, 12.5 mg, Oral, Q12H  sodium chloride, 10 mL, Intravenous, Q12H  terazosin, 1 mg, Oral, Nightly  thiamine, 100 mg, Oral, Daily      Continuous Infusions:     Vital signs in last 24 hours:  Temp:  [98 °F (36.7 °C)-99.1 °F (37.3 °C)] 98 °F (36.7 °C)  Heart Rate:  [61-69] 65  Resp:  [18-20] 20  BP: (124-157)/(57-66) 157/66    Intake/Output:    Intake/Output Summary (Last 24 hours) at 2024 0809  Last data filed at 2024 0508  Gross per 24 hour   Intake 160 ml   Output 900 ml   Net -740 ml       Exam:  /66 (BP Location: Right arm, Patient Position: Lying)   Pulse 65   Temp 98 °F (36.7 °C) (Oral)   Resp 20   Ht 172.7 cm (68\")   Wt 85.3 kg (188 lb)   SpO2 100%   BMI 28.59 kg/m²   Patient is examined using the personal protective equipment as per guidelines from infection control for this particular patient as enacted.  Hand washing was performed before and after patient interaction.  General Appearance:  Alert awake interactive chronically ill nontoxic-appearing " male                          Head:    Normocephalic, without obvious abnormality, atraumatic                           Eyes:    PERRL, conjunctivae/corneas clear, EOM's intact, both eyes                         Throat:   Lips, tongue, gums normal; oral mucosa pink and moist                           Neck:   Supple, symmetrical, trachea midline, no JVD                         Lungs:    Decreased breath sounds at the bases right greater than left                 Chest Wall:    No tenderness or deformity                          Heart:  S1-S2 irregular                  Abdomen:   Soft nontender                 Extremities: Chronic changes in the lower extremities with no cellulitis no changes of infection in the left lower extremity compared to how he was during his previous hospitalization.                        Pulses:   Pulses palpable in all extremities                            Skin: Changes in the left lower extremity due to previous cellulitis                  Neurologic: Grossly nonfocal examination       Data Review:    I reviewed the patient's new clinical results.  Results from last 7 days   Lab Units 07/01/24 0330 06/30/24 0407 06/29/24 0321 06/28/24 1914 06/25/24  0737   WBC 10*3/mm3 8.42 8.36 8.60 9.04 6.69   HEMOGLOBIN g/dL 10.9* 10.8* 10.4* 11.1* 10.0*   PLATELETS 10*3/mm3 213 222 211 243 207     Results from last 7 days   Lab Units 07/01/24 0330 06/30/24 0407 06/29/24 0321 06/28/24 1914 06/25/24  0737   SODIUM mmol/L 139 140 140 140 141   POTASSIUM mmol/L 3.8 4.0 3.7 3.8 4.1   CHLORIDE mmol/L 98 100 100 102 104   CO2 mmol/L 31.9* 33.0* 28.6 28.0 31.0*   BUN mg/dL 34* 36* 31* 34* 42*   CREATININE mg/dL 2.06* 2.29* 1.95* 2.16* 2.63*   CALCIUM mg/dL 8.6 8.7 8.4* 8.3* 8.2*   GLUCOSE mg/dL 222* 270* 241* 170* 181*     Microbiology Results (last 10 days)       Procedure Component Value - Date/Time    Blood Culture - Blood, Hand, Left [[id number]]  (Normal) Collected: 06/28/24 2139    Lab Status:  Preliminary result Specimen: Blood from Hand, Left Updated: 06/30/24 2200     Blood Culture No growth at 2 days    Blood Culture - Blood, Hand, Left [[id number]]  (Normal) Collected: 06/28/24 2139    Lab Status: Preliminary result Specimen: Blood from Hand, Left Updated: 06/30/24 2200     Blood Culture No growth at 2 days    Urine Culture - Urine, Urine, Clean Catch [[id number]]  (Abnormal) Collected: 06/21/24 1459    Lab Status: Final result Specimen: Urine, Clean Catch Updated: 06/22/24 1515     Urine Culture Yeast isolated     Comment: No further workup.         Narrative:      6.22 89k yst. DCM  Colonization of the urinary tract without infection is common. Treatment is discouraged unless the patient is symptomatic, pregnant, or undergoing an invasive urologic procedure.          Brief Urine Lab Results  (Last result in the past 365 days)        Color   Clarity   Blood   Leuk Est   Nitrite   Protein   CREAT   Urine HCG        06/30/24 1203             76.0               6/28/2024 urinalysis: Leuk Estrae2+ WBC 21-50    Assessment:    Acute UTI (urinary tract infection)-repeat urine culture negative    CAD (coronary artery disease)    Chronic diastolic heart failure    HTN (hypertension)    CKD (chronic kidney disease) stage 3, GFR 30-59 ml/min    Peripheral arterial disease    Metabolic encephalopathy    Paroxysmal atrial fibrillation  Possible left-sided pneumonia/fluid overload  Colonization of  tract yeast with repeat urine culture negative.  Recommendations/discussion:  Continue ceftriaxone and can be changed to oral third-generation cephalosporin to complete 7 days of treatment as I am not sure urinary tract infection is the reason why there was change in status including weakness and transient confusion.  I had a long conversation with the patient and his wife about risk of these episodes re-occurring over the course of time and importance of getting physical therapy to build his strength.  Tino Nagel,  MD  7/1/2024  08:09 EDT    Parts of this note may be an electronic transcription/translation of spoken language to printed text using the Dragon dictation system.

## 2024-07-01 NOTE — CASE MANAGEMENT/SOCIAL WORK
Case Management Discharge Note      Final Note: Home with spouse, [facility]. No additional CCP needs.    Provided Post Acute Provider List?: Yes  Post Acute Provider List: Home Health  Delivered To: Support Person, Patient  Method of Delivery: In person    Selected Continued Care - Admitted Since 6/28/2024       Destination    No services have been selected for the patient.                Durable Medical Equipment    No services have been selected for the patient.                Dialysis/Infusion    No services have been selected for the patient.                Home Medical Care       Service Provider Selected Services Address Phone Fax Patient Preferred    [facility] [address], [place], [address], [place] [address] [phone] --              Therapy    No services have been selected for the patient.                Community Resources    No services have been selected for the patient.                Community & DME    No services have been selected for the patient.                    Selected Continued Care - Prior Encounters Includes continued care and service providers with selected services from prior encounters from 3/30/2024 to 7/1/2024      Discharged on 6/25/2024 Admission date: 5/30/2024 - Discharge disposition: Home or Self Care      Durable Medical Equipment       Service Provider Selected Services Address Phone Fax Patient Preferred    [facility] [address], [place] [address] [phone] --              Home Medical Care       Service Provider Selected Services Address Phone Fax Patient Preferred    [facility] [address], [place], [address], [place] [address] [phone] --                               Final Discharge Disposition Code: 06 - home with home health care

## 2024-07-01 NOTE — NURSING NOTE
CWON note: pt seen for evaluation of MASD to groin and scrotal skin. He is noted to have some excoriation and redness with yeast component. Instructed pt and his wife to clean the area with soap and water and then dry well. Alternate use of Miconazole cream with zinc barrier cream (both were provided) 3-4x day, and allow the skin to be open to air as much as possible, avoid use of briefs or tight underwear. They verbalized understanding of instructions.

## 2024-07-01 NOTE — PLAN OF CARE
Goal Outcome Evaluation:  Plan of Care Reviewed With: patient, spouse        Progress: no change  Outcome Evaluation: pt seen for PT this PM - mod A for bed mobility, mod A to stand and take one step forward/one step back. pt with posterior lean, bilat knee hyperextension and bilat foot drop. Unsafe to ambulate further or perform stand pivot transfer as a result. Discussed home set up and safety for home as pt plans to d/c home later today. Educated pt and spouse on recommendation for rehab at this time as pt with increased fall risk d/t current deficits. As pt and spouse declining rehab did educate on importance of wearing bilat AFO at home, using walker and performing only pivot transfers. Also recommend standard w/c with removable arm rests as pt is safer for squat pivot transfer at this time. Pt continues to benefit from skilled PT to address mobility deficits. Recommend SNU at d/c, pt requires 24/7 assist at this time and is mod A with mobility.      Anticipated Discharge Disposition (PT): skilled nursing facility

## 2024-07-01 NOTE — OUTREACH NOTE
Prep Survey      Flowsheet Row Responses   [facility] facility patient discharged from? [facility]   Is LACE score < 7 ? No   Eligibility Readm Mgmt   Discharge diagnosis Acute UTI   Does the patient have one of the following disease processes/diagnoses(primary or secondary)? Other   Does the patient have Home health ordered? Yes   What is the Home health agency?  [facility]    Prep survey completed? Yes            BAKARI JAIN - Registered Nurse

## 2024-07-01 NOTE — CASE MANAGEMENT/SOCIAL WORK
Discharge Planning Assessment  [facility]     Patient Name: Dilip Verma  MRN: [id number]  Today's Date: 7/1/2024    Admit Date: 6/28/2024    Plan: home with wife and [facility]    Discharge Needs Assessment       Row Name 07/01/24 0940       Living Environment    People in Home spouse    Current Living Arrangements home    Potentially Unsafe Housing Conditions none    In the past 12 months has the electric, gas, oil, or water company threatened to shut off services in your home? No    Primary Care Provided by self    Provides Primary Care For no one    Family Caregiver if Needed spouse    Quality of Family Relationships involved;helpful    Able to Return to Prior Arrangements yes       Resource/Environmental Concerns    Resource/Environmental Concerns none       Transportation Needs    In the past 12 months, has lack of transportation kept you from medical appointments or from getting medications? no    In the past 12 months, has lack of transportation kept you from meetings, work, or from getting things needed for daily living? No       Food Insecurity    Within the past 12 months, you worried that your food would run out before you got the money to buy more. Never true    Within the past 12 months, the food you bought just didn't last and you didn't have money to get more. Never true       Transition Planning    Patient/Family Anticipates Transition to home with family;home with help/services    Patient/Family Anticipated Services at Transition home health care    Transportation Anticipated family or friend will provide       Discharge Needs Assessment    Readmission Within the Last 30 Days no previous admission in last 30 days    Equipment Currently Used at Home wheelchair;rollator;oxygen;ramp;shower chair    Concerns to be Addressed discharge planning    Anticipated Changes Related to Illness none    Equipment Needed After Discharge none    Outpatient/Agency/Support Group Needs homecare agency     Discharge Facility/Level of Care Needs home with home health    Provided Post Acute Provider List? Yes    Post Acute Provider List Home Health    Delivered To Support Person;Patient    Method of Delivery In person    Patient's Choice of Community Agency(s) Current with [facility]                   Discharge Plan       Row Name 07/01/24 0941       Plan    Plan home with wife and [facility]    Patient/Family in Agreement with Plan yes    Plan Comments Spoke with pt and pt's wife bedside. Confirmed facesheet correct. Explained role of CCP. Pt reports he lives with his wife. He has ramp, rollator, transport chair and shower chair. He is current with [facility] for home O2. [facility] has [facility] and [facility] for SNF in past. Call from Noemi with [facility] they are current. Pts PCP is Dr. Fernando Camargo and he is agreeable to meds to beds at d/c. Pt refuses SNF at d/c and plans home with wife and HH. CCP to follow.                  Continued Care and Services - Admitted Since 6/28/2024       Home Medical Care       Service Provider Request Status Selected Services Address Phone Fax Patient Preferred    [facility] Request Sent N/A [address], [address], [facility] [address] [phone] --                  Selected Continued Care - Prior Encounters Includes continued care and service providers with selected services from prior encounters from 3/30/2024 to 7/1/2024      Discharged on 6/25/2024 Admission date: 5/30/2024 - Discharge disposition: Home or Self Care      Durable Medical Equipment       Service Provider Selected Services Address Phone Fax Patient Preferred    [facility] [address], [facility] [place] [facility] [address] [phone] --              Home Medical Care       Service Provider Selected Services Address Phone Fax Patient Preferred    [facility] [address], [address], [facility] [address] [phone] --                          Expected Discharge Date and Time       Expected Discharge Date Expected Discharge Time    Jul 1, 2024            Demographic Summary    No documentation.                  Functional Status    No documentation.                  Psychosocial    No documentation.                  Abuse/Neglect    No documentation.                  Legal    No documentation.                  Substance Abuse    No documentation.                  Patient Forms    No documentation.                     DAYNE Grier

## 2024-07-01 NOTE — DISCHARGE SUMMARY
Patient Name: Dilip Verma  : 1950  MRN: [id number]    Date of Admission: 2024  Date of Discharge:  2024  Primary Care Physician: Fernando Camargo DO      Chief Complaint:   Altered Mental Status      Discharge Diagnoses     Active Hospital Problems    Diagnosis  POA   • **Acute UTI (urinary tract infection) [N39.0]  Yes   • Paroxysmal atrial fibrillation [I48.0]  Yes   • Metabolic encephalopathy [G93.41]  Yes   • Peripheral arterial disease [I73.9]  Yes   • CKD (chronic kidney disease) stage 3, GFR 30-59 ml/min [N18.30]  Yes   • HTN (hypertension) [I10]  Yes   • Chronic diastolic heart failure [I50.32]  Yes   • CAD (coronary artery disease) [I25.10]  Yes      Resolved Hospital Problems   No resolved problems to display.        Hospital Course     Mr. Verma is a 74 y.o. male with a history of atrial fibrillation, CKD, HTN, CAD and CHF who presented to [facility] initially complaining of altered mental status.  Please see the admitting history and physical for further details.  He was found to have abnormal urinalysis consistent with UTI and was admitted to the hospital for further evaluation and treatment.  He was started on Rocephin and has actually had improvement in his symptoms.  Mental status now seems back to baseline.  Seen by infectious disease who recommends 7-day course of antibiotic with cephalosporin.      Day of Discharge     Subjective:  Feels much better wants to go home.    Physical Exam:  Temp:  [98 °F (36.7 °C)-99.1 °F (37.3 °C)] 98.1 °F (36.7 °C)  Heart Rate:  [61-75] 61  Resp:  [18-20] 18  BP: (124-157)/(57-66) 132/57  Body mass index is 28.59 kg/m².  Physical Exam  Vitals and nursing note reviewed.   Constitutional:       General: He is not in acute distress.  Cardiovascular:      Rate and Rhythm: Normal rate and regular rhythm.   Pulmonary:      Effort: Pulmonary effort is normal.      Breath sounds: Normal breath sounds.   Abdominal:      General: Bowel  sounds are normal.      Palpations: Abdomen is soft.      Tenderness: There is no abdominal tenderness.   Musculoskeletal:         General: No swelling.   Skin:     General: Skin is warm and dry.   Neurological:      Mental Status: He is alert. Mental status is at baseline.   Psychiatric:         Attention and Perception: He is inattentive.         Consultants     Consult Orders (all) (From admission, onward)       Start     Ordered    06/30/24 0759  Inpatient Nephrology Consult  Once        Specialty:  Nephrology  Provider:  Perla Mayen MD    06/30/24 0758    06/29/24 1539  Inpatient Infectious Diseases Consult  Once        Specialty:  Infectious Diseases  Provider:  Tino Nagel MD    06/29/24 1539    06/29/24 0600  Inpatient Case Management  Consult  Once        Provider:  (Not yet assigned)    06/29/24 0330          Procedures     Imaging Results (All)       Procedure Component Value Units Date/Time    CT Head Without Contrast [[id number]] Collected: 06/28/24 2012     Updated: 06/28/24 2021    Narrative:      CT HEAD WITHOUT CONTRAST     CLINICAL HISTORY: ams. Multiple falls.     TECHNIQUE: CT scan of the head was obtained with 3 mm axial soft tissue  and 2 mm axial bone algorithm algorithm images. No intravenous contrast  was administered. Sagittal and coronal reconstructions were obtained.     COMPARISON: No previous similar studies are available for comparison.     FINDINGS:       There is no evidence for a calvarial fracture or an acute extra-axial  hemorrhage.     Mild changes of chronic small vessel ischemic phenomena are noted. There  is a lacunar disease within the left thalamus. The ventricles, sulci,  and cisterns are age-appropriate. The gray-white matter differentiation  is within normal limits. The posterior fossa structures are within  normal limits.       Impression:         No evidence for acute traumatic intracranial pathology.           Radiation dose reduction techniques  were utilized, including automated  exposure control and exposure modulation based on body size.     This report was finalized on 6/28/2024 8:18 PM by Dr. Dario Yeboah M.D  on Workstation: [id number]       XR Chest 1 View [[id number]] Collected: 06/28/24 1953     Updated: 06/28/24 2001    Narrative:      XR CHEST 1 VW-     HISTORY: Male who is 74 years-old, short of breath     TECHNIQUE: Frontal view of the chest     COMPARISON: 6/21/2024     FINDINGS: The heart size is normal. Sternotomy wires are present. Aorta  is calcified. Pulmonary vasculature is mildly congested. A previous  right IJ catheter is no longer seen. Likely atelectasis in the left mid  to lower lung. No large pleural effusion. No pneumothorax. No acute  osseous process.       Impression:      Likely atelectasis in the left mid to lower lung. Mild  vascular congestion. Follow-up as clinical indications persist.         Results for orders placed during the hospital encounter of 06/28/24    Duplex Venous Lower Extremity - Bilateral CAR    Interpretation Summary  •  Normal bilateral lower extremity venous duplex scan.      Pertinent Labs     Results from last 7 days   Lab Units 07/01/24  0330 06/30/24  0407 06/29/24  0321 06/28/24  1914   WBC 10*3/mm3 8.42 8.36 8.60 9.04   HEMOGLOBIN g/dL 10.9* 10.8* 10.4* 11.1*   PLATELETS 10*3/mm3 213 222 211 243     Results from last 7 days   Lab Units 07/01/24  0330 06/30/24  0407 06/29/24  0321 06/28/24  1914   SODIUM mmol/L 139 140 140 140   POTASSIUM mmol/L 3.8 4.0 3.7 3.8   CHLORIDE mmol/L 98 100 100 102   CO2 mmol/L 31.9* 33.0* 28.6 28.0   BUN mg/dL 34* 36* 31* 34*   CREATININE mg/dL 2.06* 2.29* 1.95* 2.16*   GLUCOSE mg/dL 222* 270* 241* 170*   EGFR mL/min/1.73 33.0* 29.0* 35.2* 31.2*     Results from last 7 days   Lab Units 06/28/24 1914   ALBUMIN g/dL 3.4*   BILIRUBIN mg/dL 0.5   ALK PHOS U/L 92   AST (SGOT) U/L 18   ALT (SGPT) U/L 23     Results from last 7 days   Lab Units 07/01/24  0330 06/30/24  2173  "06/29/24  0321 06/28/24  1914   CALCIUM mg/dL 8.6 8.7 8.4* 8.3*   ALBUMIN g/dL  --   --   --  3.4*       Results from last 7 days   Lab Units 06/30/24  1050   PROBNP pg/mL 4,541.0*     Results from last 7 days   Lab Units 06/30/24  1203   CREATININE UR mg/dL 76.0   PROTEIN TOTAL URINE mg/dL 12.0   PROT/CREAT RATIO UR mg/G Crea 157.9         Invalid input(s): \"LDLCALC\"  Results from last 7 days   Lab Units 06/28/24  2139   BLOODCX  No growth at 2 days  No growth at 2 days         Test Results Pending at Discharge     Pending Labs       Order Current Status    Urine Culture - Urine, Urine, Clean Catch In process    Blood Culture - Blood, Hand, Left Preliminary result    Blood Culture - Blood, Hand, Left Preliminary result            Discharge Details        Discharge Medications        New Medications        Instructions Start Date   cefdinir 300 MG capsule  Commonly known as: OMNICEF   300 mg, Oral, Every 12 Hours Scheduled             Changes to Medications        Instructions Start Date   insulin lispro 100 UNIT/ML injection  Commonly known as: humaLOG  What changed: additional instructions   0-14 Units, Subcutaneous, 3 Times Daily Before Meals             Continue These Medications        Instructions Start Date   amiodarone 200 MG tablet  Commonly known as: PACERONE   200 mg, Oral, Every 24 Hours Scheduled      apixaban 5 MG tablet tablet  Commonly known as: ELIQUIS   5 mg, Oral, Every 12 Hours Scheduled      aspirin 81 MG EC tablet   81 mg, Oral, Nightly      brimonidine 0.2 % ophthalmic solution  Commonly known as: ALPHAGAN   1 drop, Both Eyes, 2 Times Daily      bumetanide 1 MG tablet  Commonly known as: BUMEX   1 mg, Oral, 2 Times Daily      DULoxetine 30 MG capsule  Commonly known as: CYMBALTA   1 capsule, Oral, Nightly      hydrocortisone 1 % cream   1 Application, Topical, 2 Times Daily      Insulin Glargine (2 Unit Dial) 300 UNIT/ML solution pen-injector injection  Commonly known as: TOUJEO   10 Units, " Subcutaneous, Daily      latanoprost 0.005 % ophthalmic solution  Commonly known as: XALATAN   1 drop, Both Eyes, Every Night at Bedtime      Magnesium 400 MG capsule   400 mg, Oral, As Needed      metoprolol tartrate 25 MG tablet  Commonly known as: LOPRESSOR   12.5 mg, Oral, Every 12 Hours Scheduled      multivitamin with minerals tablet tablet   1 tablet, Oral, Daily      terazosin 1 MG capsule  Commonly known as: HYTRIN   1 mg, Oral, Nightly      testosterone 25 MG/2.5GM (1%) gel gel  Commonly known as: ANDROGEL   25 mg, Transdermal, 2 Times Weekly      thiamine 100 MG tablet  Commonly known as: VITAMIN B1   100 mg, Oral, Daily      traMADol 50 MG tablet  Commonly known as: ULTRAM   50 mg, Oral, Nightly PRN      Vitamin D-3 125 MCG (5000 UT) tablet   1 tablet, Oral, Every 24 Hours             Stop These Medications      sildenafil 20 MG tablet  Commonly known as: REVATIO              Allergies   Allergen Reactions   • Ace Inhibitors Angioedema   • Angiotensin Receptor Blockers Angioedema and Unknown (See Comments)     Angioneurotic edema   • Dapagliflozin Other (See Comments)     UTI,  rectal abcess   • Losartan Angioedema     Angioneurotic edema   • Seroquel [Quetiapine] Hallucinations   • Xanax [Alprazolam] Unknown - High Severity   • Amlodipine Swelling   • Ativan [Lorazepam] Hallucinations   • Baclofen Hallucinations   • Misc. Sulfonamide Containing Compounds Unknown (See Comments)   • Minoxidil Confusion   • Tetracycline Rash     bliisters in mouth         Discharge Disposition:  Home-Health Care c      Discharge Diet:  Diet Order   Procedures   • Diet: Cardiac, Diabetic; Healthy Heart (2-3 Na+); Consistent Carbohydrate; Fluid Consistency: Thin (IDDSI 0)       Discharge Activity:   Activity Instructions       Activity as Tolerated              CODE STATUS:    Code Status and Medical Interventions:   Ordered at: 06/28/24 0478     Code Status (Patient has no pulse and is not breathing):    CPR (Attempt to  Resuscitate)     Medical Interventions (Patient has pulse or is breathing):    Full Support       Future Appointments   Date Time Provider Department Center   7/23/2024  9:00 AM Clive Jaime MD [facility]   7/25/2024  2:00 PM Marisol Garcia APRN [facility]     Additional Instructions for the Follow-ups that You Need to Schedule       Discharge Follow-up with PCP   As directed       Currently Documented PCP:    Fernando Camargo DO    PCP Phone Number:    [phone]     Follow Up Details: 1 to 2 weeks (or sooner if problems)               Follow-up Information       Fernando Camargo DO .    Specialty: Family Medicine  Why: 1 to 2 weeks (or sooner if problems)  Contact information:  [address] [address]  [phone]                             Additional Instructions for the Follow-ups that You Need to Schedule       Discharge Follow-up with PCP   As directed       Currently Documented PCP:    Fernando Camargo DO    PCP Phone Number:    [phone]     Follow Up Details: 1 to 2 weeks (or sooner if problems)            Time Spent on Discharge:  Greater than 30 minutes      Lasha De La Rosa MD  [facility]  07/01/24  12:59 EDT

## 2024-07-01 NOTE — PROGRESS NOTES
"      FOLLOW UP NOTE      Name: Dilip Verma ADMIT: 2024   : 1950  PCP: Fernando Camargo DO    MRN: [id number] LOS: 0 days   AGE/SEX: 74 y.o. male  ROOM: [facility]     Date of Service: 2024                           CHIEF COMPLIANTS / REASON FOR FOLLOW UP                Subjective:      Patient feeling better.  Apparently admitted for UTI symptoms.  Alert and oriented today.  Says he is eating better.  Reports good urine output, 900 mL charted.         Review of Systems:       Negative except as above  ROS was otherwise negative except as mentioned in the [place].        OBJECTIVE                                                                        Exam:  /57 (BP Location: Right arm, Patient Position: Lying)   Pulse 75   Temp 98.1 °F (36.7 °C) (Oral)   Resp 20   Ht 172.7 cm (68\")   Wt 85.3 kg (188 lb)   SpO2 90%   BMI 28.59 kg/m²   Intake/Output last 3 shifts:  I/O last 3 completed shifts:  In: 640 [P.O.:640]  Out: 1450 [Urine:1450]  Intake/Output this shift:  I/O this shift:  In: -   Out: 300 [Urine:300]    General Appearance: Chronically ill no acute distress  Lungs:   Clear to auscultation bilaterally, respirations unlabored  Heart: RRR  Abdomen:  Soft, nontender  Extremities:  Extremities normal, trace dependent edema  Neurologic:   Alert and oriented, no focal deficits    Scheduled Meds:amiodarone, 200 mg, Oral, Q24H  apixaban, 5 mg, Oral, Q12H  aspirin, 81 mg, Oral, Nightly  brimonidine, 1 drop, Both Eyes, BID  bumetanide, 1 mg, Oral, BID  cefdinir, 300 mg, Oral, Q12H  DULoxetine, 30 mg, Oral, Nightly  guaiFENesin, 1,200 mg, Oral, Q12H  insulin glargine, 10 Units, Subcutaneous, Nightly  insulin lispro, 2-9 Units, Subcutaneous, 4x Daily AC & at Bedtime  ipratropium-albuterol, 3 mL, Nebulization, Q4H While Awake - RT  latanoprost, 1 drop, Both Eyes, Nightly  metoprolol tartrate, 12.5 mg, Oral, Q12H  sodium chloride, 10 mL, Intravenous, Q12H  terazosin, 1 mg, Oral, Nightly  thiamine, " 100 mg, Oral, Daily      Continuous Infusions:   PRN Meds:  acetaminophen **OR** [DISCONTINUED] acetaminophen **OR** [DISCONTINUED] acetaminophen    senna-docusate sodium **AND** polyethylene glycol **AND** bisacodyl **AND** bisacodyl    dextrose    dextrose    glucagon (human recombinant)    melatonin    ondansetron ODT **OR** ondansetron    sodium chloride    sodium chloride    traMADol         Data Review:                                                                           Labs reviewed        Imaging:                                                                           Radiology reviewed           ASSESSMENT:                                                                                Acute UTI (urinary tract infection)    CAD (coronary artery disease)    Chronic diastolic heart failure    HTN (hypertension)    CKD (chronic kidney disease) stage 3, GFR 30-59 ml/min    Peripheral arterial disease    Metabolic encephalopathy    Paroxysmal atrial fibrillation         Recent AMENA earlier this month 6/2024 requiring HD, last HD on 6/17, sCr on recent discharge was 2.63, now sCr 2.29.   CKD III etiology likely diabetic and hypertensive nephropathy with baseline sCr ~0.9-1.2 mg/dL  Metabolic alkalosis  HTN, trends labile  UTI  Likely metabolic encephalopathy  Heart failure with preserved ejection fraction  CAD  T2DM  Anemia      PLAN:                                                                            Recent AMENA requiring HD on 6/15 and 6/17, has been off HD since, sCr on discharge 2.63, now sCr 2.29, improving  Creatinine continue to improve, seems stable with good urine output.  Baseline CKD III, previous baseline sCr 0.9-1.2  Will continue the same diuretics for now.  Negligible proteinuria.  Urinalysis consistent with probable infection.  Mild metabolic acidosis is stable.  BP reasonable although some variability noted, no overt hypotension.  Electrolytes stable.  Continue treatment of UTI as per  ID.    : Will require close outpatient follow-up.     Nya Saez MD  [facility]  7/1/2024  09:35 EDT

## 2024-07-01 NOTE — CASE MANAGEMENT/SOCIAL WORK
Continued Stay Note  [facility]     Patient Name: Dilip Verma  MRN: [id number]  Today's Date: 7/1/2024    Admit Date: 6/28/2024    Plan: Home with spouse, [facility]   Discharge Plan       Row Name 07/01/24 6029       Plan    Plan Home with spouse, [facility]     Patient/Family in Agreement with Plan yes    Plan Comments PT notified CCP pt may benefit from wheelchair at [place]. CCP met with pt and spouse, Beny, at bedside to discuss wheelchair. Pt and Beny declined wheelchair at this time. Beny also stated pt was to have POC's delivered home today but pts not home to receive them. CCP explained portable tank can be provided and then Beny can re-schedule delivery of POC's. Beny verbalized understanding and is agreeable. [facility] notified and portable tank delivered to bedside for transport home. Tara CALLE/CCP      Row Name 07/01/24 1306       Plan    Final Discharge Disposition Code 06 - home with home health care    Final Note Home with spouse, [facility]. No additional CCP needs.                   Discharge Codes    No documentation.                 Expected Discharge Date and Time       Expected Discharge Date Expected Discharge Time    Jul 1, 2024               Marilyn Dubois RN

## 2024-07-01 NOTE — DISCHARGE PLACEMENT REQUEST
"Dilip Reilly \"Ck\" (74 y.o. Male)       Date of Birth   1950    Social Security Number       Address   [address] [place] [address]    Home Phone       MRN   [id number]       Religious   Catholic    Marital Status                               Admission Date   6/28/24    Admission Type   Emergency    Admitting Provider   Murphy Hdz MD    Attending Provider   Lasha De La Rosa MD    Department, Room/Bed   [address], [address]       Discharge Date       Discharge Disposition       Discharge Destination                                 Attending Provider: Lasha De La Rosa MD    Allergies: Ace Inhibitors, Angiotensin Receptor Blockers, Dapagliflozin, Losartan, Seroquel [Quetiapine], Xanax [Alprazolam], Amlodipine, Ativan [Lorazepam], Baclofen, Misc. Sulfonamide Containing Compounds, Minoxidil, Tetracycline    Isolation: None   Infection: None   Code Status: CPR    Ht: 172.7 cm (68\")   Wt: 85.3 kg (188 lb)    Admission Cmt: None   Principal Problem: Acute UTI (urinary tract infection) [N39.0]                   Active Insurance as of 6/28/2024       Primary Coverage       Payor Plan Insurance Group Employer/Plan Group    [facility] MEDICARE REPLACEMENT [facility] MED ADV GROUP [id number]       Payor Plan Address Payor Plan Phone Number Payor Plan Fax Number Effective Dates    [address] [address]   1/1/2023 - None Entered    [facility] [id number]         Subscriber Name Subscriber Birth Date Member ID       DILIP REILLY 1950 [id number]                     Emergency Contacts        (Rel.) Home Phone Work Phone Mobile Phone    Beny Reilly (Spouse) -- -- [phone]    SILVIA REILLY (Son) [phone] -- [phone]            "

## 2024-07-01 NOTE — THERAPY TREATMENT NOTE
Patient Name: Dilip Verma  : 1950    MRN: [id number]                              Today's Date: 2024       Admit Date: 2024    Visit Dx:     ICD-10-CM ICD-9-CM   1. Altered mental status, unspecified altered mental status type  R41.82 780.97   2. Acute UTI  N39.0 599.0   3. Multiple falls  R29.6 V15.88     Patient Active Problem List   Diagnosis    Anxiety    Colon polyp    Type 2 diabetes mellitus with hyperglycemia, with long-term current use of insulin    Erectile dysfunction    Hyperlipidemia    Type 2 acute myocardial infarction    CAD (coronary artery disease)    Hx of CABG    Chronic diastolic heart failure    Hypersomnia due to medical condition    CSA (central sleep apnea)    Periodic breathing    HTN (hypertension)    History of stroke    CKD (chronic kidney disease) stage 3, GFR 30-59 ml/min    Urinary retention    Acute on chronic renal failure    Acute UTI (urinary tract infection)    Acute on chronic diastolic (congestive) heart failure    Bacteriuria    Rectal pain    Status post total replacement of hip    Vitamin D deficiency    Post-acute COVID-19 syndrome    Insulin dose changed    Arthropathy associated with neurological disorder    Personal history of colonic polyps    Type 2 diabetes mellitus with diabetic neuropathy, with long-term current use of insulin    Cervical spinal stenosis    Abscess of skin    Overweight    Cervical stenosis of spine    Spinal stenosis, lumbar region, without neurogenic claudication    Medically noncompliant    Chronic heart failure with preserved ejection fraction (HFpEF)    Carotid stenosis    Peripheral arterial disease    Sepsis    Hyperkalemia    Metabolic encephalopathy    AMENA (acute kidney injury)    Paroxysmal atrial fibrillation     Past Medical History:   Diagnosis Date    AMENA (acute kidney injury) 2020    Alcoholism 1989    not since     CORI positive     Anemia     Anxiety     Ataxia     Atypical chest pain 2022    SEEN  AT [facility] ER    Balance disorder     Carotid stenosis 03/28/2024    Cataract     BILATERAL, S/P EXTRACTION    Cervical radiculopathy 11/11/2019    SEEN AT  [facility] ER    Cervical spinal cord compression     Chronic diastolic (congestive) heart failure     Chronic kidney disease     STAGE 3, FOLLOWED BY DR. VIVAR    Chronic pancreatitis     Closed left subtrochanteric femur fracture 12/01/2020    ADMITTED TO [facility]    Closed nondisplaced intertrochanteric fracture of left femur 12/01/2020    ADMITTED TO [facility]    Colon polyps     FOLLOWED BY DR. AVTAR JEONG    Constipation     Contracture, right hand     Coronary artery disease     CABG 7/2019    COVID-19 07/2022    DDD (degenerative disc disease), cervical     Diabetes mellitus, type II     IDDM    Diabetic retinopathy     Difficulty walking     Dysphagia     Elevated brain natriuretic peptide (BNP) level 08/2014    Elevated LFTs 03/2021    Erectile dysfunction     Fissure, anal 2022    Foot drop     Fuchs' corneal dystrophy of right eye     Glaucoma     BILATERAL    History of alcohol abuse     Hyperlipidemia     Hypersomnia     Hypertension     Hypertensive urgency 02/25/2020    ADMITTED TO [facility]    Insomnia     Kidney stones     LV dysfunction 06/2016    Lyme disease     Lymphadenopathy syndrome 05/2021    Macular edema     BILATERAL    Myocardial infarction 11/05/2019    NSTEMI, ADMITTED TO [facility]    Myocardial infarction 07/12/2019    NSTEMI, ADMITTED TO [facility]    Neuropathy in diabetes     Non-celiac gluten sensitivity     Orthostasis     Osteoporosis     Oxygen dependent     PAD (peripheral artery disease)     Paroxysmal atrial fibrillation 06/06/2024    PNA (pneumonia) 06/2016    LEFT LOBE    Polyneuropathy     PTSD (post-traumatic stress disorder)     Pulmonary hypertension     Pulmonary nodule     Senile ectropion of both lower eyelids 02/2022    Sepsis 12/16/2020    D/T UTI, ADMITTED TO [facility]    Sleep apnea     STATES DOESN'T USE BIPAP OR CPAP    Spinal stenosis in cervical  "region     SEVERE-LIMITED ROM    Stroke 2012    \"slight stroke\"    Syncope and collapse 07/06/2019    ADMITTED TO [place]    Urinary retention 04/05/2021    SEEN AT [facility] ER    UTI (urinary tract infection)     Vision loss     Vitamin D deficiency      Past Surgical History:   Procedure Laterality Date    CARDIAC CATHETERIZATION N/A 07/15/2019    Procedure: Coronary angiography;  Surgeon: Carrie Price MD;  Location:  [facility];  Service: Cardiovascular    CARDIAC CATHETERIZATION N/A 07/15/2019    Procedure: Left Heart Cath;  Surgeon: Carrie Price MD;  Location:  [facility];  Service: Cardiovascular    CARDIAC CATHETERIZATION N/A 07/15/2019    Procedure: Left ventriculography;  Surgeon: Carrie Price MD;  Location:  [facility];  Service: Cardiovascular    CARDIAC CATHETERIZATION  07/15/2019    Procedure: Functional Flow [place];  Surgeon: Carrie Price MD;  Location:  [facility];  Service: Cardiovascular    CARDIAC CATHETERIZATION N/A 11/06/2019    Procedure: Right and Left Heart Cath;  Surgeon: Mya Smith MD;  Location:  [facility];  Service: Cardiovascular    CARDIAC CATHETERIZATION N/A 11/06/2019    Procedure: Coronary angiography;  Surgeon: Mya Smith MD;  Location:  [facility];  Service: Cardiovascular    CARDIAC SURGERY      CATARACT EXTRACTION Left 2014    CATARACT EXTRACTION Right 11/2016    PHACO/IOL, DR. LEN DENTON    COLONOSCOPY N/A 03/16/2023    ENTIRE COLON WNL, RESCOPE IN 5 YRS, DR. LINDA LIZARRAGA AT [facility]    COLONOSCOPY W/ POLYPECTOMY N/A 01/02/2015    A FEW DIVERTICULA IN SIGMOID, 6 MM TUBULOVILLOUS ADENOMA POLYP IN RECTUM, SMALL HEMORRHOIDS, MELANOSIS COLI, DR. AVTAR JEOGN AT [place] [facility]    CORONARY ARTERY BYPASS GRAFT N/A 07/18/2019    Procedure: INTRAOPERATIVE SHOAIB; STERNOTOMY CORONARY ARTERY BYPASS x 3  USING LEFT INTERNAL MAMMARY ARTERY GRAFT UTILIZING " ENDOSCOPICALLY HARVESTED RIGHT GREATER SAPHENOUS VEIN AND PRP.;  Surgeon: Bill Devi MD;  Location: [facility] MAIN OR;  Service: Cardiothoracic    CYSTOSCOPY BLADDER STONE LITHOTRIPSY N/A     DENTAL PROCEDURE Bilateral     3 surgeries inder implants    FEMUR IM NAILING/RODDING Left 12/03/2020    Procedure: LEFT HIP INTRAMEDULLARY NAIL;  Surgeon: Niraj Ravi MD;  Location: [facility] MAIN OR;  Service: Orthopedic Spine;  Laterality: Left;    INCISION AND DRAINAGE PERIRECTAL ABSCESS N/A 10/04/2023    Procedure: Incision and drainage of perianal and buttock abscess;  Surgeon: Herbie Villatoro MD;  Location: [facility] MAIN OR;  Service: General;  Laterality: N/A;    INGUINAL HERNIA REPAIR Bilateral     TOENAIL EXCISION  06/2022    TONSILLECTOMY Bilateral     TOTAL HIP ARTHROPLASTY REVISION Left 01/11/2022    Procedure: TOTAL HIP ARTHROPLASTY REVISION- POSTERIOR;  Surgeon: Jurgen Candelaria II, MD;  Location: [facility] MAIN OR;  Service: Orthopedics;  Laterality: Left;    VASECTOMY N/A       General Information       Row Name 07/01/24 1429          Physical Therapy Time and Intention    Document Type therapy note (daily note)  -ST     Mode of Treatment physical therapy  -ST       Row Name 07/01/24 1429          General Information    Patient Profile Reviewed yes  -ST     Existing Precautions/Restrictions fall;oxygen therapy device and L/min  2 L  -ST       Row Name 07/01/24 1429          Cognition    Orientation Status (Cognition) oriented x 3  -ST       Row Name 07/01/24 1429          Safety Issues, Functional Mobility    Impairments Affecting Function (Mobility) endurance/activity tolerance;grasp;strength  -ST     Comment, Safety Issues/Impairments (Mobility) gait belt, nonskid socks donned  -ST               User Key  (r) = Recorded By, (t) = Taken By, (c) = Cosigned By      Initials Name Provider Type    ST Sahara Harrison PT Physical Therapist                   Mobility       Row Name 07/01/24 1429           Bed Mobility    Bed Mobility supine-sit-supine  -ST     Supine-Sit [place] (Bed Mobility) moderate assist (50% patient effort);verbal cues  -ST     Sit-Supine [place] (Bed Mobility) moderate assist (50% patient effort);verbal cues  -       Row Name 07/01/24 1429          Bed-Chair Transfer    Comment, (Bed-Chair Transfer) unsafe to trial today d/t posterior lean, bilat knee hyperextension and poor foot clearance  -       Row Name 07/01/24 1429          Sit-Stand Transfer    Sit-Stand [place] (Transfers) moderate assist (50% patient effort)  -ST     Assistive Device (Sit-Stand Transfers) walker, front-wheeled  -ST     Comment, (Sit-Stand Transfer) x 3 during session  -       Row Name 07/01/24 1429          Gait/Stairs (Locomotion)    [place] Level (Gait) moderate assist (50% patient effort);1 person assist;verbal cues  -ST     Assistive Device (Gait) walker, front-wheeled  -ST     Deviations/Abnormal Patterns (Gait) bilateral deviations;farhana decreased;festinating/shuffling;gait speed decreased;stride length decreased  -ST     Bilateral Gait Deviations forward flexed posture;heel strike decreased  -ST     Left Sided Gait Deviations foot drop/toe drag;knee hyperextension  -ST     Right Sided Gait Deviations foot drop/toe drag;knee hyperextension  -ST     Comment, (Gait/Stairs) one step forward, one step backward with significant bilat knee hyperextension and foot drop noted. unsafe to walk further at this time  -ST               User Key  (r) = Recorded By, (t) = Taken By, (c) = Cosigned By      Initials Name Provider Type    Sahara Jacobson PT Physical Therapist                   Obj/Interventions       Row Name 07/01/24 1435          Balance    Comment, Balance mod A for balance with rwx - R hand intermittently dropping off walker. posterior lean - tactile cues for glut activation  -ST               User Key  (r) = Recorded By, (t) = Taken By, (c) = Cosigned By       Initials Name Provider Type    Sahara Jacobson PT Physical Therapist                   Goals/Plan    No documentation.                  Clinical Impression       Row Name 07/01/24 1436          Pain    Pretreatment Pain Rating 0/10 - no pain  -     Posttreatment Pain Rating 0/10 - no pain  -       Row Name 07/01/24 [facility]          Plan of Care Review    Plan of Care Reviewed With patient;spouse  -     Progress no change  -     Outcome Evaluation pt seen for PT this PM - mod A for bed mobility, mod A to stand and take one step forward/one step back. pt with posterior lean, bilat knee hyperextension and bilat foot drop. Unsafe to ambulate further or perform stand pivot transfer as a result. Discussed home set up and safety for home as pt plans to d/c home later today. Educated pt and spouse on recommendation for rehab at this time as pt with increased fall risk d/t current deficits. As pt and spouse declining rehab did educate on importance of wearing bilat AFO at home, using walker and performing only pivot transfers. Also recommend standard w/c with removable arm rests as pt is safer for squat pivot transfer at this time. Pt continues to benefit from skilled PT to address mobility deficits. Recommend SNU at d/c, pt requires 24/7 assist at this time and is mod A with mobility.  -       Row Name 07/01/24 1436          Therapy Assessment/Plan (PT)    Rehab Potential (PT) fair, will monitor progress closely  -     Therapy Frequency (PT) 5 times/wk  -       Row Name 07/01/24 143          Positioning and Restraints    Pre-Treatment Position in bed  -ST     Post Treatment Position bed  -ST     In Bed notified nsg;supine;call light within reach;encouraged to call for assist;exit alarm on  spoke with CCP about needing w/c  -               User Key  (r) = Recorded By, (t) = Taken By, (c) = Cosigned By      Initials Name Provider Type    Sahara Jacobson PT Physical Therapist                    Outcome Measures       Row Name 07/01/24 1440          How much help from another person do you currently need...    Turning from your back to your side while in flat bed without using bedrails? 2  -ST     Moving from lying on back to sitting on the side of a flat bed without bedrails? 2  -ST     Moving to and from a bed to a chair (including a wheelchair)? 2  -ST     Standing up from a chair using your arms (e.g., wheelchair, bedside chair)? 2  -ST     Climbing 3-5 steps with a railing? 1  -ST     To walk in hospital room? 2  -ST     AM-PAC 6 Clicks Score (PT) 11  -ST     Highest Level of Mobility Goal 4 --> Transfer to chair/commode  -ST       Row Name 07/01/24 1440          Functional Assessment    Outcome Measure Options AM-PAC 6 Clicks Basic Mobility (PT)  -ST               User Key  (r) = Recorded By, (t) = Taken By, (c) = Cosigned By      Initials Name Provider Type    ST Sahara Harrison PT Physical Therapist                                 Physical Therapy Education       Title: PT OT SLP Therapies (In Progress)       Topic: Physical Therapy (In Progress)       Point: Mobility training (In Progress)       Learning Progress Summary             Patient Acceptance, E,TB, NR by  at 7/1/2024 1441                         Point: Home exercise program (In Progress)       Learning Progress Summary             Patient Acceptance, E,TB, NR by  at 7/1/2024 1441                         Point: Body mechanics (In Progress)       Learning Progress Summary             Patient Acceptance, E,TB, NR by  at 7/1/2024 1441                         Point: Precautions (In Progress)       Learning Progress Summary             Patient Acceptance, E,TB, NR by  at 7/1/2024 1441                                         User Key       Initials Effective Dates Name Provider Type Discipline     09/22/22 -  Sahara Harrison PT Physical Therapist PT                  PT Recommendation and Plan     Plan of Care Reviewed With:  patient, spouse  Progress: no change  Outcome Evaluation: pt seen for PT this PM - mod A for bed mobility, mod A to stand and take one step forward/one step back. pt with posterior lean, bilat knee hyperextension and bilat foot drop. Unsafe to ambulate further or perform stand pivot transfer as a result. Discussed home set up and safety for home as pt plans to d/c home later today. Educated pt and spouse on recommendation for rehab at this time as pt with increased fall risk d/t current deficits. As pt and spouse declining rehab did educate on importance of wearing bilat AFO at home, using walker and performing only pivot transfers. Also recommend standard w/c with removable arm rests as pt is safer for squat pivot transfer at this time. Pt continues to benefit from skilled PT to address mobility deficits. Recommend SNU at d/c, pt requires 24/7 assist at this time and is mod A with mobility.     Time Calculation:         PT Charges       Row Name 07/01/24 1441             Time Calculation    Start Time 1318  -ST      Stop Time 1345  -ST      Time Calculation (min) 27 min  -ST      PT Received On 07/01/24  -ST      PT - Next Appointment 07/02/24  -ST         Time Calculation- PT    Total Timed Code Minutes- PT 27 minute(s)  -ST         Timed Charges    95728 - PT Therapeutic Activity Minutes 27  -ST         Total Minutes    Timed Charges Total Minutes 27  -ST       Total Minutes 27  -ST                User Key  (r) = Recorded By, (t) = Taken By, (c) = Cosigned By      Initials Name Provider Type    ST Sahara Harrison, PT Physical Therapist                  Therapy Charges for Today       Code Description Service Date Service Provider Modifiers Qty    [id number]  PT THERAPEUTIC ACT EA 15 MIN 7/1/2024 Sahara Harrison PT GP 2            PT G-Codes  Outcome Measure Options: AM-PAC 6 Clicks Basic Mobility (PT)  AM-PAC 6 Clicks Score (PT): 11  AM-PAC 6 Clicks Score (OT): 15  PT Discharge Summary  Anticipated  Discharge Disposition (PT): skilled nursing facility    Sahara Harrison, PT  7/1/2024

## 2024-07-01 NOTE — DISCHARGE PLACEMENT REQUEST
"Dilip Reilly \"Ck\" (74 y.o. Male)       Date of Birth   1950    Social Security Number       Address   [address] [place] [address]    Home Phone       MRN   [id number]       [employer]    Marital Status                               Admission Date   6/28/24    Admission Type   Emergency    Admitting Provider   Murphy Hdz MD    Attending Provider   Lasha De La Rosa MD    Department, Room/Bed   [address], [address]/1       Discharge Date       Discharge Disposition   Home-Health Care [facility]    Discharge Destination                                 Attending Provider: Lasha De La Rosa MD    Allergies: Ace Inhibitors, Angiotensin Receptor Blockers, Dapagliflozin, Losartan, Seroquel [Quetiapine], Xanax [Alprazolam], Amlodipine, Ativan [Lorazepam], Baclofen, Misc. Sulfonamide Containing Compounds, Minoxidil, Tetracycline    Isolation: None   Infection: None   Code Status: CPR    Ht: 172.7 cm (68\")   Wt: 85.3 kg (188 lb)    Admission Cmt: None   Principal Problem: Acute UTI (urinary tract infection) [N39.0]                   Active Insurance as of 6/28/2024       Primary Coverage       Payor Plan Insurance Group Employer/Plan Group    [facility] MEDICARE REPLACEMENT [facility] MED ADV GROUP [id number]       Payor Plan Address Payor Plan Phone Number Payor Plan Fax Number Effective Dates    [address] [address]   1/1/2023 - None Entered    [facility] [id number]         Subscriber Name Subscriber Birth Date Member ID       DILIP REILLY 1950 [id number]                     Emergency Contacts        (Rel.) Home Phone Work Phone Mobile Phone    TommieBeny (Spouse) -- -- [phone]    TOMMIESILVIA (Son) [phone] -- [phone]                "

## 2024-07-03 LAB
BACTERIA SPEC AEROBE CULT: NORMAL
BACTERIA SPEC AEROBE CULT: NORMAL

## 2024-07-08 ENCOUNTER — READMISSION MANAGEMENT (OUTPATIENT)
Dept: CALL CENTER | Facility: HOSPITAL | Age: 74
End: 2024-07-08
Payer: MEDICARE

## 2024-07-08 NOTE — OUTREACH NOTE
Medical Week 1 Survey      Flowsheet Row Responses   [facility] patient discharged from? [place]   Does the patient have one of the following disease processes/diagnoses(primary or secondary)? Other   Week 1 attempt successful? Yes   Call start time 1255   Call end time 1301   Discharge diagnosis Acute UTI   Is patient permission given to speak with other caregiver? Yes   List who call center can speak with Beny   Person spoke with today (if not patient) and relationship Beny   Meds reviewed with patient/caregiver? Yes   Is the patient having any side effects they believe may be caused by any medication additions or changes? No   Does the patient have all medications ordered at discharge? Yes   Is the patient taking all medications as directed (includes completed medication regime)? Yes   Does the patient have a primary care provider?  Yes   Does the patient have an appointment with their PCP within 7 days of discharge? Yes   Has the patient kept scheduled appointments due by today? Yes   What is the Home health agency?  [facility]    Has home health visited the patient within 72 hours of discharge? Yes   What DME was ordered? [place] for DME   Has all DME been delivered? Yes   DME comments Pt has home O2 in place but increased to 3L now per wife.   Comments Wife reports that the pt is doing better, no dysuria, fever or chills. Per pt's wife he is improving.   Did the patient receive a copy of their discharge instructions? Yes   What is the patient's perception of their health status since discharge? Improving   Is the patient/caregiver able to teach back signs and symptoms related to disease process for when to call PCP? Yes   Is the patient/caregiver able to teach back signs and symptoms related to disease process for when to call 911? Yes   Week 1 call completed? Yes   Revoked No further contact(revokes)-requires comment   Graduated/Revoked comments quick call the pt's wife was getting another call    Call end time 1306            Ricarda MONTES - Registered Nurse

## 2024-07-09 NOTE — PROGRESS NOTES
"Enter Query Response Below      Query Response: Unable to clinically determine             If applicable, please update the problem list.   Patient: Dilip Verma \"Ck\"        : 1950  Account: [id number]           Admit Date: 2024        How to Respond to this query:       a. Click New Note     b. Answer query within the yellow box.                c. Update the Problem List, if applicable.      If you have any questions about this query contact me at: [email or web address]     Dr. Flynn,    74 year old male presented with altered mental status admitted with \"AMENA (acute kidney injury)\", \" Sepsis\", and \"Metabolic encephalopathy\" per discharge summary. Infectious disease consult \" 1-metabolic encephalopathy due to   2-systemic infection as a result of urinary tract infection as well as evolving sepsis traumatic cellulitis of the left leg.\" Discharge summary \" Hospital course was also complicated by encephalopathy.  It was thought that cefepime was the likely cause of this.\" Cefepime was not started until 6/3. H&P  \"Metabolic encephalopathy\".  Nephrology consult  \" AMENA likely prerenal ATN 2/2 urosepsis with hemodynamic changes.\"  On day of admission lactate 2.2, WBC 30.36, procalcitonin 0.95, heart rate as high as 104, MAP down to 65, PF ratio 178, and oxygen as low as 80% on room air. Temperature 94.5 on 6/15.  GCS 14 on , as low as 11 on , and 15 on discharge.   Creatinine 0.90 on  to 1.51 on , which later increased to 6.93 on .   Treatment: Creatinine monitoring, Nephrology consult, infectious disease consult, IV zosyn, normal saline 1 L bolus, IV vancomycin, IV rocephin, normal saline infusion, IV cefepime, amiodarone infusion, dextrose infusion, heparin infusion, dextrose 25 g (), duoneb, IV bumex, hemodialysis, sodium bicarb tablet, and supplemental oxygen up to 5 LNC.    Please clarify the following:     Severe sepsis causing acute organ dysfunction, as " evidenced by acute kidney injury and metabolic  encephalopathy  Severe sepsis causing acute organ dysfunction, as evidenced by metabolic encephalopathy  Severe sepsis causing acute organ dysfunction, as evidenced by acute kidney injury  Acute kidney injury and metabolic encephalopathy due to: ____________  Other, please specify: _____________  Unable to clinically determine    By submitting this query, we are merely seeking further clarification of documentation to accurately reflect all conditions that you are monitoring, evaluating, treating or that extend the hospitalization or utilize additional resources of care. Please utilize your independent clinical judgment when addressing the question(s) above.     This query and your response, once completed, will be entered into the legal medical record.    Sincerely,  Keyona Bedoya RN  Clinical Documentation Integrity Program

## 2024-07-23 ENCOUNTER — PROCEDURE VISIT (OUTPATIENT)
Dept: NEUROLOGY | Facility: CLINIC | Age: 74
End: 2024-07-23
Payer: MEDICARE

## 2024-07-23 DIAGNOSIS — R29.898 WEAKNESS OF BOTH LOWER EXTREMITIES: ICD-10-CM

## 2024-07-23 DIAGNOSIS — R29.898 ARM WEAKNESS: ICD-10-CM

## 2024-07-23 NOTE — PROGRESS NOTES
EMG and Nerve Conduction Studies    I.      Instrument used: Neuromax 1002  II.     Please see data sheets for tabular summary of NCS and details on methods, temperatures and lab standards.   III.    EMG muscles tested for upper extremity studies include the deltoid, biceps, triceps, pronator teres, extensor digitorum communis, first dorsal interosseous and abductor pollicis brevis.    IV.   EMG muscles tested for lower extremity studies include the vastus lateralis, tibialis anterior, peroneus longus, medial gastrocnemius and extensor digitorum brevis.    V.    Additional muscles tested as needed.  Paraspinal muscles tested as needed.   VI.   Please see data sheets for tabular summary of EMG findings.   VII. The complete report includes the data sheets.      Indication: Numbness and weakness in all 4 extremities  History: 74-year-old male with at least 12-year history of diabetes which is poorly controlled based on recent hemoglobin A1c of 8.1% who has had numbness in his feet for at least 10 years which has been progressive up to the knees as well as having bilateral foot drops as well as severe weakness of the hands right worse than left.  The patient also has history of stroke apparently involving the right side.  He has spinal stenosis in his cervical and lumbar spine also.    The patient is anticoagulated with Eliquis      Ht: 172.7 cm  Wt: 85.3 kg; BMI 28.59  HbA1C:   Lab Results   Component Value Date    HGBA1C 8.10 (H) 06/30/2024     TSH:   Lab Results   Component Value Date    TSH 2.020 06/04/2024       Technical summary:  Nerve conduction studies were obtained in both arms as well as screening studies in the left leg with 1 comparison on the right.  Skin temperatures were very cold and he was difficult to warm up.  Temperatures on the hand were at least 32 °C measured on the palm at the time of study.  Temperatures were a bit cold in the feet still but temperature correction was not needed since there  were no responses.  Needle examination was obtained on selected muscles in all 4 extremities.    Results:  1.  Absent median antidromic sensory potentials bilaterally.  2.  Absent ulnar antidromic sensory potentials bilaterally.  3.  Very low amplitude left median motor distal latency at 6.2 ms with slow conduction velocity of 36.3 m/s.  On the right there is only a questionable response at 16.3 ms from wrist stimulation.  4.  Severely prolonged left ulnar motor distal latency of 8 ms with very low amplitude of 0.100 mV.  Proximal stimulation produced only a questionable response.  Absent right ulnar motor potentials from proximal and distal stimulation sites recorded over the abductor digiti quinti.  5.  Absent peroneal motor potentials from proximal and distal stimulation sites recorded over the extensor digitorum brevis muscles.  6.  Absent left tibial motor responses from proximal and distal stimulation sites recorded over the abductor halicus.  7.  Needle examination of selected muscles in all 4 extremities showed multiple abnormalities.  In the upper extremities there were positive sharp waves in the first dorsal interosseous, right extensor digitorum communis and both pronator teres muscles.  There was an increased number of large motor units in the first dorsal interosseous muscles bilaterally and in the right extensor digitorum commonness, both pronator teres and triceps muscles with increased firing rates and reduced interference patterns.  The biceps and deltoid muscles showed normal insertional activities motor units and recruitment.  Cervical paraspinals were not studied since the patient is anticoagulated.    In the legs there were positive sharp waves in all muscles except the extensor digitorum brevis muscles.  There was an increased number of large motor units with increased firing rates and reduced interference patterns most prominent in the tibialis anterior muscles.  The vastus lateralis muscles  showed only questionable changes in motor units and recruitment.  Extensor digitorum brevis muscles showed no motor units.  Lumbar paraspinals were not studied since the patient is anticoagulated    Impression:  Abnormal study showing extreme polyneuropathy.  Nearly all nerve conduction studies showed no responses.  There was denervation and reinnervation changes in multiple muscles which included some proximal muscles in the arms such as a pronator teres and triceps as well as the proximal muscles in the legs, vastus lateralis.  I cannot entirely exclude superimposed bilateral C7 and L4 radiculopathies based on this information.  The more distal muscles with needle exam changes can simply be associated with the polyneuropathy.  Unfortunately root level involvement could not be assessed since the patient is anticoagulated and due to risk of potential hematoma formation without being seen, I do not study those muscles in this circumstance.  Study results were discussed with the patient    Clive Jaime M.D.              Dictated utilizing Dragon dictation.

## 2024-07-24 DIAGNOSIS — R29.898 ARM WEAKNESS: ICD-10-CM

## 2024-07-24 DIAGNOSIS — R29.898 WEAKNESS OF BOTH LOWER EXTREMITIES: ICD-10-CM

## 2024-08-02 ENCOUNTER — OFFICE VISIT (OUTPATIENT)
Dept: CARDIOLOGY | Facility: CLINIC | Age: 74
End: 2024-08-02
Payer: MEDICARE

## 2024-08-02 VITALS
HEART RATE: 60 BPM | HEIGHT: 68 IN | BODY MASS INDEX: 28.04 KG/M2 | WEIGHT: 185 LBS | OXYGEN SATURATION: 89 % | DIASTOLIC BLOOD PRESSURE: 50 MMHG | SYSTOLIC BLOOD PRESSURE: 100 MMHG

## 2024-08-02 DIAGNOSIS — I27.20 PULMONARY HYPERTENSION: ICD-10-CM

## 2024-08-02 DIAGNOSIS — I25.10 CORONARY ARTERY DISEASE INVOLVING NATIVE CORONARY ARTERY OF NATIVE HEART WITHOUT ANGINA PECTORIS: Primary | ICD-10-CM

## 2024-08-02 DIAGNOSIS — I50.32 CHRONIC DIASTOLIC CHF (CONGESTIVE HEART FAILURE): ICD-10-CM

## 2024-08-02 DIAGNOSIS — G47.33 OSA (OBSTRUCTIVE SLEEP APNEA): ICD-10-CM

## 2024-08-02 RX ORDER — AMIODARONE HYDROCHLORIDE 200 MG/1
200 TABLET ORAL
Qty: 90 TABLET | Refills: 1 | Status: SHIPPED | OUTPATIENT
Start: 2024-08-02

## 2024-08-02 NOTE — PROGRESS NOTES
Date of Office Visit: 24  Encounter Provider: LEIDY Mejía  Place of Service: [facility] CARDIOLOGY  Patient Name: Dilip Verma  :1950    Chief Complaint   Patient presents with    <9>Coronary artery disease involving native coronary artery o    Follow-up   :     HPI: Dilip Verma is a 74 y.o. male  with hypertension, hyperlipidemia, diabetes mellitus, coronary artery disease, congestive heart failure, thalamic hemorrhage, pulmonary hypertension, atrial fibrillation and obstructive sleep apnea.      He is followed by Dr. Amador Reyes.  I will visit with him in follow-up today have reviewed his medical record.     He was admitted to [facility] in 2019 for syncope.  He was orthostatic with supine hypertension.  Clonidine was discontinued and hydralazine was increased.  He then presented to [facility] on 2019 with uncontrolled blood pressure and blurry vision.  He had an abnormal BNP and troponin.  Echocardiogram showed an ejection fraction of 53%, moderate left ventricular hypertrophy, mild to moderate left atrial enlargement and no significant valvular disease.  He had a Holter monitor which showed nonsustained atrial tachycardia.  Due to his elevated troponin he had heart catheterization which showed normal ejection fraction with multivessel coronary artery disease.  Carotid duplex showed mild bilateral carotid artery stenosis.  Transthoracic echocardiogram showed normal left ventricular systolic function with an ejection fraction of 55%, grade 3 diastolic dysfunction, no significant valvular disease.  He did have severe pulmonary hypertension on that.  He underwent coronary artery bypass grafting with LIMA to LAD, vein graft to obtuse marginal 1, vein graft to distal right coronary artery.  He had some postoperative bradycardia and his beta-blocker was held.  He then had postoperative atrial fibrillation and was discharged on amiodarone.      He then was readmitted in early November 2019 with chest pain and non-STEMI.  Perfusion stress test suggested inferior wall and lateral wall ischemia.  Echocardiogram showed normal left ventricular systolic function with an EF of 62%, moderate left ventricular hypertrophy, grade 2 diastolic dysfunction and severe pulmonary hypertension.  Heart catheterization completed 11/6/2019 showed widely patent grafts.  His PA pressure was 80/30 with a wedge of 20.  He received IV diureses.  He had increased creatinine and BUN.  He was transition back to p.o. Lasix.   On December 1, 2020 he fell and broke his leg.  He had surgery for that.  He was admitted in December 2020 with E. coli bacteremia and sepsis.   He had revision of left hip in January 2022 and then was diagnosed with COVID in July 2022 after a Fourth of July gathering.  Follow-up echocardiogram October 2022 showed grade 2 diastolic dysfunction with an EF of 66%.       He was hospitalized for severely uncontrolled hypertension.  He had about a 20 pound weight gain and describe orthopnea secondary to volume overload.  Nephrology help to manage diuretic and he was treated with IV Lasix twice daily.  He diuresed well and was started on nifedipine and hydralazine with improved blood pressure.  He then had some low blood pressure readings so nifedipine was stopped.     He was admitted at the end of May due to hallucinations and painful urination.  He was diagnosed with urosepsis and had sinus tachycardia.  He was treated with antibiotics.  In he had hyperkalemia and nephrology help to manage that.  He then went into A-fib with RVR and was given IV digoxin.  He had some hypotension.  A-fib was a recurrent diagnoses as he previously had A-fib after open heart surgery in 2019.  He was started on amiodarone bolus with drip per protocol.  He converted to normal sinus rhythm with amiodarone and was also started on Eliquis 5 mg twice daily.  He acutely went on dialysis.   There was some concern of amyloid heart however he had no signs of heavy or light chains on UPEP and there was no M spike.  No additional workup was recommended.  Echocardiogram during admission showed hyperdynamic left ventricular systolic function with hypertrophy, grade 2 diastolic dysfunction, aortic valve sclerosis but no stenosis or regurgitation, mitral valve thickening with trace regurgitation, moderate tricuspid valve regurgitation with RVSP 74 mmHg.    He was admitted at the end of June with altered mental status and found to have abnormal urinalysis.  He was treated initially with Rocephin for UTI and had improved mental status.  Infectious disease recommended 7-day course of antibiotic with cephalosporin.    He presents today for reassessment.  He is accompanied by his spouse.  [facility].  He denies chest pain or edema or palpitations.  He has intermittent lightheadedness and fatigue.  He reports fatigue after taking metoprolol.  He is maintained on 3 L of oxygen.  His home blood pressures have been anywhere between 112-140 systolic.      Allergies   Allergen Reactions    Ace Inhibitors Angioedema    Angiotensin Receptor Blockers Angioedema and Unknown (See Comments)     Angioneurotic edema    Dapagliflozin Other (See Comments)     UTI,  rectal abcess    Losartan Angioedema     Angioneurotic edema    Seroquel [Quetiapine] Hallucinations    Xanax [Alprazolam] Unknown - High Severity    Amlodipine Swelling    Ativan [Lorazepam] Hallucinations    Baclofen Hallucinations    Misc. Sulfonamide Containing Compounds Unknown (See Comments)    Minoxidil Confusion    Tetracycline Rash     bliisters in mouth             Family and social history reviewed.     ROS  All other systems were reviewed and are negative          Objective:     Vitals:    08/02/24 1143   BP: 100/50   BP Location: Left arm   Patient Position: Sitting   Pulse: 60   SpO2: (!) 89%   Weight: 83.9 kg (185 lb)  "  Height: 172.7 cm (68\")     Body mass index is 28.13 kg/m².    PHYSICAL EXAM:  Pulmonary:      Effort: Pulmonary effort is normal.      Breath sounds: Examination of the right-lower field reveals decreased breath sounds. Examination of the left-lower field reveals decreased breath sounds. Decreased breath sounds present.      Comments: oxygen  Cardiovascular:      Normal rate. Regular rhythm.   Edema:     Ankle: bilateral trace edema of the ankle.        Procedures      Current Outpatient Medications   Medication Sig Dispense Refill    amiodarone (PACERONE) 200 MG tablet Take 1 tablet by mouth Daily. 90 tablet 1    apixaban (ELIQUIS) 5 MG tablet tablet Take 1 tablet by mouth Every 12 (Twelve) Hours. Indications: Atrial Fibrillation 60 tablet 5    aspirin 81 MG EC tablet Take 1 tablet by mouth Every Night.      brimonidine (ALPHAGAN) 0.2 % ophthalmic solution Administer 1 drop to both eyes 2 (Two) Times a Day.      bumetanide (BUMEX) 1 MG tablet Take 1 tablet by mouth 2 (Two) Times a Day. 180 tablet 1    Cholecalciferol (Vitamin D-3) 125 MCG (5000 UT) tablet Take 1 tablet by mouth Daily.      DULoxetine (CYMBALTA) 30 MG capsule Take 1 capsule by mouth Every Night.      hydrocortisone 1 % cream Apply 1 Application topically to the appropriate area as directed 2 (Two) Times a Day. 28 g 0    Insulin Glargine, 2 Unit Dial, (TOUJEO) 300 UNIT/ML solution pen-injector injection Inject 10 Units under the skin into the appropriate area as directed Daily. (Patient taking differently: Inject 22 Units under the skin into the appropriate area as directed Daily.)      insulin lispro (humaLOG) 100 UNIT/ML injection Inject 0-14 Units under the skin into the appropriate area as directed 3 (Three) Times a Day Before Meals.  12    latanoprost (XALATAN) 0.005 % ophthalmic solution Administer 1 drop to both eyes every night at bedtime.      Magnesium 400 MG capsule Take 400 mg by mouth As Needed.      multivitamin with minerals " (MULTIVITAMIN ADULTS PO) Take 1 tablet by mouth Daily.      terazosin (HYTRIN) 1 MG capsule Take 1 capsule by mouth Every Night. 30 capsule 0    testosterone (ANDROGEL) 25 MG/2.5GM (1%) gel gel Place 25 mg on the skin as directed by provider 2 (Two) Times a Week.      thiamine (VITAMIN B1) 100 MG tablet Take 1 tablet by mouth Daily. 30 tablet 0    traMADol (ULTRAM) 50 MG tablet Take 1 tablet by mouth At Night As Needed.       No current facility-administered medications for this visit.     Assessment:       Diagnosis Plan   1. Coronary artery disease involving native coronary artery of native heart without angina pectoris        2. Pulmonary hypertension        3. COCO (obstructive sleep apnea)        4. Chronic diastolic CHF (congestive heart failure)             No orders of the defined types were placed in this encounter.        Plan:     1.  74-year-old gentleman with coronary artery disease status post coronary artery bypass grafting times 3 July 2019 with LIMA to LAD, vein graft to obtuse marginal 1, vein graft to distal right coronary artery with normal left ventricular systolic function.  Then non-STEMI and  inferolateral wall ischemia on perfusion stress test.  Cardiac catheterization 11/2019 showed patent grafts normal left ventricular systolic function. Preserved LV function on echo June 2023.  -No angina   -Continue aspirin.  He is not on statin therapy due to preference  2.  Paroxysmal J-gix-gdgcpichy diagnoses June 2024 in the setting of urosepsis.  He converted with amiodarone and is maintained on Eliquis 5 mg twice daily.  -His complaint of fatigue with metoprolol 12.5 mg twice daily so we will stop that at this time.  I have asked him to follow-up in clinic in 3 months and continue current dose amiodarone with Eliquis.  -This is considered complex management of a chronic medical condition.  3. Hypertension .     Blood pressure appears stable based on review of log.  Most values are between 112-140  systolic.  4.  Hyperlipidemia-not on statin therapy and previously refused to add statin   5.  Diabetes mellitus followed by PCP  6.  Obstructive sleep apnea not treating at this time.  Follows with Dr. Cai.  Reportedly was told he needs another test  7.  Pulmonary hypertension RVSP  74 mmHg on echo June 2024.  Now on oxygen 3 L routinely  8.  Bilateral carotid artery stenosis mild on duplex July 2019  9.  Chronic diastolic congestive heart failure/heart failure preserved ejection fraction on last echo June 2024-He is now on bumetanide 1 mg twice daily. he had dialysis last Monday  10.  Mild digital ischemia bilateral on AMINTA May 2022.  He is following with vascular, Dr. Darci chaudhary  11. History of hypogonadism with prior testosterone replacement treatment.    12.Chronic kidney disease follows with nephrology.  He had temporary dialysis.  He is now followed by Dr. Mayen  13. remote history of thalamic hemorrhage noted on MRI July 2019  14.  Spinal issues-scheduled for a MRI  15. urosepsis 05/2024 no recurrent UTI June 2024.  He now has [facility] and is working with physical therapy to increase mobility.  16.  Left upper extremity thrombophlebitis but no DVT June 2024       Call with questions or concerns.             It has been a pleasure to participate in this patient's care.      Thank you,  LEIDY Mejía      **I used Dragon to dictate this note:**

## 2024-08-03 NOTE — ED NOTES
Patient from home via ems; call was for possible abscess on the buttock region that he noticed a few days ago. Patient is also having urinary retention.   Immediate family member

## 2024-08-12 ENCOUNTER — TRANSCRIBE ORDERS (OUTPATIENT)
Dept: ADMINISTRATIVE | Facility: HOSPITAL | Age: 74
End: 2024-08-12
Payer: MEDICARE

## 2024-08-12 DIAGNOSIS — J98.11 ATELECTASIS OF LEFT LUNG: ICD-10-CM

## 2024-09-23 ENCOUNTER — HOSPITAL ENCOUNTER (OUTPATIENT)
Facility: HOSPITAL | Age: 74
Discharge: HOME | End: 2024-09-23
Admitting: INTERNAL MEDICINE
Payer: MEDICARE

## 2024-09-23 DIAGNOSIS — J98.11 ATELECTASIS OF LEFT LUNG: ICD-10-CM

## 2024-09-23 PROCEDURE — 71250 CT THORAX DX C-: CPT

## 2024-10-27 NOTE — TELEPHONE ENCOUNTER
Addended by: SARAH QUIÑONES on: 10/27/2024 05:56 PM     Modules accepted: Orders     Spoke with pt's wife about recommendations. She verbalized understanding. Med list updated.    Rosa Maria Shrestha, RN  Triage RN [id number]

## 2024-11-04 ENCOUNTER — OFFICE VISIT (OUTPATIENT)
Dept: CARDIOLOGY | Facility: CLINIC | Age: 74
End: 2024-11-04
Payer: MEDICARE

## 2024-11-04 VITALS
HEART RATE: 67 BPM | WEIGHT: 187 LBS | OXYGEN SATURATION: 94 % | BODY MASS INDEX: 28.34 KG/M2 | SYSTOLIC BLOOD PRESSURE: 125 MMHG | HEIGHT: 68 IN | DIASTOLIC BLOOD PRESSURE: 77 MMHG

## 2024-11-04 DIAGNOSIS — I48.0 PAF (PAROXYSMAL ATRIAL FIBRILLATION): ICD-10-CM

## 2024-11-04 DIAGNOSIS — I50.32 CHRONIC DIASTOLIC CHF (CONGESTIVE HEART FAILURE): ICD-10-CM

## 2024-11-04 DIAGNOSIS — I65.23 BILATERAL CAROTID ARTERY STENOSIS: ICD-10-CM

## 2024-11-04 DIAGNOSIS — I25.10 CORONARY ARTERY DISEASE INVOLVING NATIVE CORONARY ARTERY OF NATIVE HEART WITHOUT ANGINA PECTORIS: Primary | ICD-10-CM

## 2024-11-04 DIAGNOSIS — I73.9 PAD (PERIPHERAL ARTERY DISEASE): ICD-10-CM

## 2024-11-04 NOTE — PROGRESS NOTES
Date of Office Visit: 24  Encounter Provider: LEIDY Mejía  Place of Service: [facility] CARDIOLOGY  Patient Name: Dilip Verma  :1950    Chief Complaint   Patient presents with    Shortness of Breath    Dizziness    Fatigue    Follow-up   :     HPI: Dilip Verma is a 74 y.o. male  with hypertension, hyperlipidemia, diabetes mellitus, coronary artery disease, congestive heart failure, thalamic hemorrhage, pulmonary hypertension, atrial fibrillation and obstructive sleep apnea.      He is followed by Dr. Amador Reyes.  I will visit with him in follow-up today have reviewed his medical record.     He was admitted to [facility] in 2019 for syncope.  He was orthostatic with supine hypertension.  Clonidine was discontinued and hydralazine was increased.  He then presented to [facility] on 2019 with uncontrolled blood pressure and blurry vision.  He had an abnormal BNP and troponin.  Echocardiogram showed an ejection fraction of 53%, moderate left ventricular hypertrophy, mild to moderate left atrial enlargement and no significant valvular disease.  He had a Holter monitor which showed nonsustained atrial tachycardia.  Due to his elevated troponin he had heart catheterization which showed normal ejection fraction with multivessel coronary artery disease.  Carotid duplex showed mild bilateral carotid artery stenosis.  Transthoracic echocardiogram showed normal left ventricular systolic function with an ejection fraction of 55%, grade 3 diastolic dysfunction, no significant valvular disease.  He did have severe pulmonary hypertension on that.  He underwent coronary artery bypass grafting with LIMA to LAD, vein graft to obtuse marginal 1, vein graft to distal right coronary artery.  He had some postoperative bradycardia and his beta-blocker was held.  He then had postoperative atrial fibrillation and was discharged on amiodarone.     He then was  readmitted in early November 2019 with chest pain and non-STEMI.  Perfusion stress test suggested inferior wall and lateral wall ischemia.  Echocardiogram showed normal left ventricular systolic function with an EF of 62%, moderate left ventricular hypertrophy, grade 2 diastolic dysfunction and severe pulmonary hypertension.  Heart catheterization completed 11/6/2019 showed widely patent grafts.  His PA pressure was 80/30 with a wedge of 20.  He received IV diureses.  He had increased creatinine and BUN.  He was transition back to p.o. Lasix.   On December 1, 2020 he fell and broke his leg.  He had surgery for that.  He was admitted in December 2020 with E. coli bacteremia and sepsis.   He had revision of left hip in January 2022 and then was diagnosed with COVID in July 2022 after a Fourth of July gathering.  Follow-up echocardiogram October 2022 showed grade 2 diastolic dysfunction with an EF of 66%.       He was hospitalized for severely uncontrolled hypertension.  He had about a 20 pound weight gain and describe orthopnea secondary to volume overload.  Nephrology help to manage diuretic and he was treated with IV Lasix twice daily.  He diuresed well and was started on nifedipine and hydralazine with improved blood pressure.  He then had some low blood pressure readings so nifedipine was stopped.     He was admitted at the end of May due to hallucinations and painful urination.  He was diagnosed with urosepsis and had sinus tachycardia.  He was treated with antibiotics.  In he had hyperkalemia and nephrology help to manage that.  He then went into A-fib with RVR and was given IV digoxin.  He had some hypotension.  A-fib was a recurrent diagnoses as he previously had A-fib after open heart surgery in 2019.  He was started on amiodarone bolus with drip per protocol.  He converted to normal sinus rhythm with amiodarone and was also started on Eliquis 5 mg twice daily.  He acutely went on dialysis.  There was some  concern of amyloid heart however he had no signs of heavy or light chains on UPEP and there was no M spike.  No additional workup was recommended.  Echocardiogram during admission showed hyperdynamic left ventricular systolic function with hypertrophy, grade 2 diastolic dysfunction, aortic valve sclerosis but no stenosis or regurgitation, mitral valve thickening with trace regurgitation, moderate tricuspid valve regurgitation with RVSP 74 mmHg.     He was admitted at the end of June with altered mental status and found to have abnormal urinalysis.  He was treated initially with Rocephin for UTI and had improved mental status.  Infectious disease recommended 7-day course of antibiotic with cephalosporin.    He presents today for reassessment.  He is concerned that he is having increased fatigue from his amiodarone.  He has been sleeping on his right side with his fist very close to his right breast/chest.  He has had some discomfort in this area when he turns his head to the right.  This is new over the past 2 weeks but is starting to get a little better.  He denies edema or palpitation.  He had an in-clinic sleep study and has follow-up with sleep medicine to discuss the results in the next 2 weeks.  No near-syncope or syncope.  No blood in the urine or stool with Eliquis.          Allergies   Allergen Reactions    Ace Inhibitors Angioedema    Angiotensin Receptor Blockers Angioedema and Unknown (See Comments)     Angioneurotic edema    Dapagliflozin Other (See Comments)     UTI,  rectal abcess    Losartan Angioedema     Angioneurotic edema    Seroquel [Quetiapine] Hallucinations    Xanax [Alprazolam] Unknown - High Severity    Amlodipine Swelling    Ativan [Lorazepam] Hallucinations    Baclofen Hallucinations    Misc. Sulfonamide Containing Compounds Unknown (See Comments)    Minoxidil Confusion    Tetracycline Rash     bliisters in mouth             Family and social history reviewed.     ROS  All other systems  "were reviewed and are negative          Objective:     Vitals:    11/04/24 1145   BP: 125/77   BP Location: Left arm   Patient Position: Sitting   Cuff Size: Adult   Pulse: 67   SpO2: 94%   Weight: 84.8 kg (187 lb)   Height: 172.7 cm (68\")     Body mass index is 28.43 kg/m².    PHYSICAL EXAM:  Pulmonary:      Effort: Pulmonary effort is normal.      Breath sounds: Examination of the right-lower field reveals decreased breath sounds. Examination of the left-lower field reveals decreased breath sounds. Decreased breath sounds present.      Comments: Oxygen in place  Cardiovascular:      Normal rate. Regular rhythm.   Musculoskeletal:      Comments: Right upper arm flaccid       Procedures      Current Outpatient Medications   Medication Sig Dispense Refill    apixaban (ELIQUIS) 5 MG tablet tablet Take 1 tablet by mouth Every 12 (Twelve) Hours. Indications: Atrial Fibrillation 60 tablet 5    aspirin 81 MG EC tablet Take 1 tablet by mouth Every Night.      brimonidine (ALPHAGAN) 0.2 % ophthalmic solution Administer 1 drop to both eyes 2 (Two) Times a Day.      bumetanide (BUMEX) 1 MG tablet Take 1 tablet by mouth 2 (Two) Times a Day. 180 tablet 1    Cholecalciferol (Vitamin D-3) 125 MCG (5000 UT) tablet Take 1 tablet by mouth Daily.      DULoxetine (CYMBALTA) 30 MG capsule Take 1 capsule by mouth Every Night.      hydrocortisone 1 % cream Apply 1 Application topically to the appropriate area as directed 2 (Two) Times a Day. 28 g 0    Insulin Glargine, 2 Unit Dial, (TOUJEO) 300 UNIT/ML solution pen-injector injection Inject 10 Units under the skin into the appropriate area as directed Daily. (Patient taking differently: Inject 22 Units under the skin into the appropriate area as directed Daily.)      insulin lispro (humaLOG) 100 UNIT/ML injection Inject 0-14 Units under the skin into the appropriate area as directed 3 (Three) Times a Day Before Meals.  12    latanoprost (XALATAN) 0.005 % ophthalmic solution Administer 1 " drop to both eyes every night at bedtime.      Magnesium 400 MG capsule Take 400 mg by mouth As Needed.      multivitamin with minerals (MULTIVITAMIN ADULTS PO) Take 1 tablet by mouth Daily.      terazosin (HYTRIN) 1 MG capsule Take 1 capsule by mouth Every Night. 30 capsule 0    testosterone (ANDROGEL) 25 MG/2.5GM (1%) gel gel Place 25 mg on the skin as directed by provider 2 (Two) Times a Week.      thiamine (VITAMIN B1) 100 MG tablet Take 1 tablet by mouth Daily. 30 tablet 0    traMADol (ULTRAM) 50 MG tablet Take 1 tablet by mouth At Night As Needed.       No current facility-administered medications for this visit.     Assessment:       Diagnosis Plan   1. Coronary artery disease involving native coronary artery of native heart without angina pectoris        2. PAF (paroxysmal atrial fibrillation)        3. PAD (peripheral artery disease)        4. Chronic diastolic CHF (congestive heart failure)        5. Bilateral carotid artery stenosis             No orders of the defined types were placed in this encounter.        Plan:         1.  74-year-old gentleman with coronary artery disease status post coronary artery bypass grafting times 3 July 2019 with LIMA to LAD, vein graft to obtuse marginal 1, vein graft to distal right coronary artery with normal left ventricular systolic function.  Then non-STEMI and  inferolateral wall ischemia on perfusion stress test.  Cardiac catheterization 11/2019 showed patent grafts normal left ventricular systolic function. Preserved LV function on echo June 2023.  -No angina   -Continue aspirin.  He is not on statin therapy due to preference  2.  Paroxysmal T-adl-ixdjjsxqd diagnoses June 2024 in the setting of urosepsis.  Elevated CHADS2 Vascor.  -We previously stopped low-dose metoprolol tartrate due to fatigue.  He is now concerned he is having fatigue from amiodarone so I will stop this.  He will continue on Eliquis 5 mg twice daily because he has no bleeding issues.  -This is  considered complex management of a chronic medical condition.  3. Hypertension .     Blood pressure appears stable   4.  Hyperlipidemia-not on statin therapy and previously refused to add statin   5.  Diabetes mellitus-on insulin and now following with endocrinology  6.  Obstructive sleep apnea-he is now followed by sleep medicine through [facility].  7.  Pulmonary hypertension RVSP  74 mmHg on echo June 2024.  [place] on routine oxygen  8.  Bilateral carotid artery stenosis mild on duplex July 2019  9.  Chronic diastolic congestive heart failure/heart failure preserved ejection fraction on last echo June 2024-He is now on bumetanide 1 mg twice daily. he had dialysis last Monday  10.  Mild digital ischemia bilateral on AMINTA May 2022.  He is following with vascular, Dr. Darci chaudhary  11. History of hypogonadism with prior testosterone replacement treatment.    12.Chronic kidney disease follows with nephrology.  He had temporary dialysis.  He is now followed by Dr. Mayen  13. remote history of thalamic hemorrhage noted on MRI July 2019  14.  Spinal issues  15. urosepsis 05/2024 no recurrent UTI June 2024.    16.  Left upper extremity thrombophlebitis but no DVT June 2024      Follow-up in 3 months.  He will call sooner with any issues.          It has been a pleasure to participate in this patient's care.      Thank you,  LEIDY Mejía      **I used Dragon to dictate this note:**

## 2024-11-15 DIAGNOSIS — I65.23 BILATERAL CAROTID ARTERY STENOSIS: ICD-10-CM

## 2024-11-15 DIAGNOSIS — I70.213 ATHEROSCLEROSIS OF NATIVE ARTERY OF BOTH LOWER EXTREMITIES WITH INTERMITTENT CLAUDICATION: Primary | ICD-10-CM

## 2025-02-04 ENCOUNTER — OFFICE VISIT (OUTPATIENT)
Dept: CARDIOLOGY | Facility: CLINIC | Age: 75
End: 2025-02-04
Payer: MEDICARE

## 2025-02-04 VITALS
WEIGHT: 191.2 LBS | HEART RATE: 76 BPM | BODY MASS INDEX: 28.98 KG/M2 | DIASTOLIC BLOOD PRESSURE: 82 MMHG | SYSTOLIC BLOOD PRESSURE: 140 MMHG | HEIGHT: 68 IN | OXYGEN SATURATION: 90 %

## 2025-02-04 DIAGNOSIS — I27.20 PULMONARY HYPERTENSION: ICD-10-CM

## 2025-02-04 DIAGNOSIS — G47.33 OSA (OBSTRUCTIVE SLEEP APNEA): ICD-10-CM

## 2025-02-04 DIAGNOSIS — I50.32 CHRONIC DIASTOLIC CHF (CONGESTIVE HEART FAILURE): ICD-10-CM

## 2025-02-04 DIAGNOSIS — I73.9 PAD (PERIPHERAL ARTERY DISEASE): ICD-10-CM

## 2025-02-04 DIAGNOSIS — I48.0 PAF (PAROXYSMAL ATRIAL FIBRILLATION): Primary | ICD-10-CM

## 2025-02-04 DIAGNOSIS — R94.31 ABNORMAL EKG: ICD-10-CM

## 2025-02-04 DIAGNOSIS — R07.89 CHEST PAIN, ATYPICAL: ICD-10-CM

## 2025-02-04 PROCEDURE — 3079F DIAST BP 80-89 MM HG: CPT | Performed by: NURSE PRACTITIONER

## 2025-02-04 PROCEDURE — 3077F SYST BP >= 140 MM HG: CPT | Performed by: NURSE PRACTITIONER

## 2025-02-04 PROCEDURE — 99214 OFFICE O/P EST MOD 30 MIN: CPT | Performed by: NURSE PRACTITIONER

## 2025-02-04 PROCEDURE — 1160F RVW MEDS BY RX/DR IN RCRD: CPT | Performed by: NURSE PRACTITIONER

## 2025-02-04 PROCEDURE — 93000 ELECTROCARDIOGRAM COMPLETE: CPT | Performed by: NURSE PRACTITIONER

## 2025-02-04 PROCEDURE — 1159F MED LIST DOCD IN RCRD: CPT | Performed by: NURSE PRACTITIONER

## 2025-02-04 NOTE — PROGRESS NOTES
Date of Office Visit: 25  Encounter Provider: LEIDY Mejía  Place of Service: [facility] CARDIOLOGY  Patient Name: Dilip Verma  :1950    Chief Complaint   Patient presents with    Coronary Artery Disease   :     HPI: Dilip Verma is a 74 y.o. male  with hypertension, hyperlipidemia, diabetes mellitus, coronary artery disease, congestive heart failure, thalamic hemorrhage, pulmonary hypertension, atrial fibrillation and obstructive sleep apnea.      He is followed by Dr. Amador Reyes.  I will visit with him in follow-up today have reviewed his medical record.     He was admitted to [facility] in 2019 for syncope.  He was orthostatic with supine hypertension.  Clonidine was discontinued and hydralazine was increased.  He then presented to [facility] on 2019 with uncontrolled blood pressure and blurry vision.  He had an abnormal BNP and troponin.  Echocardiogram showed an ejection fraction of 53%, moderate left ventricular hypertrophy, mild to moderate left atrial enlargement and no significant valvular disease.  He had a Holter monitor which showed nonsustained atrial tachycardia.  Due to his elevated troponin he had heart catheterization which showed normal ejection fraction with multivessel coronary artery disease.  Carotid duplex showed mild bilateral carotid artery stenosis.  Transthoracic echocardiogram showed normal left ventricular systolic function with an ejection fraction of 55%, grade 3 diastolic dysfunction, no significant valvular disease.  He did have severe pulmonary hypertension on that.  He underwent coronary artery bypass grafting with LIMA to LAD, vein graft to obtuse marginal 1, vein graft to distal right coronary artery.  He had some postoperative bradycardia and his beta-blocker was held.  He then had postoperative atrial fibrillation and was discharged on amiodarone.     He then was readmitted in early 2019 with chest  pain and non-STEMI.  Perfusion stress test suggested inferior wall and lateral wall ischemia.  Echocardiogram showed normal left ventricular systolic function with an EF of 62%, moderate left ventricular hypertrophy, grade 2 diastolic dysfunction and severe pulmonary hypertension.  Heart catheterization completed 11/6/2019 showed widely patent grafts.  His PA pressure was 80/30 with a wedge of 20.  He received IV diureses.  He had increased creatinine and BUN.  He was transition back to p.o. Lasix.   On December 1, 2020 he fell and broke his leg.  He had surgery for that.  He was admitted in December 2020 with E. coli bacteremia and sepsis.   He had revision of left hip in January 2022 and then was diagnosed with COVID in July 2022 after a Fourth of July gathering.  Follow-up echocardiogram October 2022 showed grade 2 diastolic dysfunction with an EF of 66%.       He was hospitalized for severely uncontrolled hypertension.  He had about a 20 pound weight gain and describe orthopnea secondary to volume overload.  Nephrology help to manage diuretic and he was treated with IV Lasix twice daily.  He diuresed well and was started on nifedipine and hydralazine with improved blood pressure.  He then had some low blood pressure readings so nifedipine was stopped.     He was admitted at the end of May due to hallucinations and painful urination.  He was diagnosed with urosepsis and had sinus tachycardia.  He was treated with antibiotics.  In he had hyperkalemia and nephrology help to manage that.  He then went into A-fib with RVR and was given IV digoxin.  He had some hypotension.  A-fib was a recurrent diagnoses as he previously had A-fib after open heart surgery in 2019.  He was started on amiodarone bolus with drip per protocol.  He converted to normal sinus rhythm with amiodarone and was also started on Eliquis 5 mg twice daily.  He acutely went on dialysis.  There was some concern of amyloid heart however he had no  signs of heavy or light chains on UPEP and there was no M spike.  No additional workup was recommended.  Echocardiogram during admission showed hyperdynamic left ventricular systolic function with hypertrophy, grade 2 diastolic dysfunction, aortic valve sclerosis but no stenosis or regurgitation, mitral valve thickening with trace regurgitation, moderate tricuspid valve regurgitation with RVSP 74 mmHg.     He was admitted at the end of June with altered mental status and found to have abnormal urinalysis.  He was treated initially with Rocephin for UTI and had improved mental status.  Infectious disease recommended 7-day course of antibiotic with cephalosporin.      He presents today for reassessment.  He is increasing his mobility but remains with low energy and frequently naps.  He has left and right chest discomfort but he thinks that is from using his arms to get up out of his chair frequently.  He also reports shortness of breath with exertion and occasional dizziness.  No palpitations or swelling issues.  He has occasional pressure in his head and issues with spinal stenosis.  He follows routinely with nephrology.  He stopped Eliquis on his own.  He reportedly had a negative sleep apnea test recently.        Allergies   Allergen Reactions    Ace Inhibitors Angioedema    Angiotensin Receptor Blockers Angioedema and Unknown (See Comments)     Angioneurotic edema    Dapagliflozin Other (See Comments)     UTI,  rectal abcess    Losartan Angioedema     Angioneurotic edema    Seroquel [Quetiapine] Hallucinations    Xanax [Alprazolam] Unknown - High Severity    Amlodipine Swelling    Ativan [Lorazepam] Hallucinations    Baclofen Hallucinations    Misc. Sulfonamide Containing Compounds Unknown (See Comments)    Minoxidil Confusion    Tetracycline Rash     bliisters in mouth             Family and social history reviewed.     ROS  All other systems were reviewed and are negative          Objective:     Vitals:     "02/04/25 1256   BP: 140/82   BP Location: Left arm   Patient Position: Sitting   Cuff Size: Adult   Pulse: 76   SpO2: 90%   Weight: 86.7 kg (191 lb 3.2 oz)   Height: 172.7 cm (68\")     Body mass index is 29.07 kg/m².    PHYSICAL EXAM:  Pulmonary:      Effort: Pulmonary effort is normal.      Breath sounds: Normal breath sounds.   Cardiovascular:      Normal rate. Regular rhythm.   Edema:     Ankle: bilateral trace edema of the ankle.  Musculoskeletal:      Comments: Right upper extremity weakness         ECG 12 Lead    Date/Time: 2/4/2025 1:23 PM  Performed by: Danii Dennison APRN    Authorized by: Danii Dennison APRN  Comparison: compared with previous ECG   Similar to previous ECG  Rhythm: sinus rhythm  Rate: normal  ST Depression: II, III, aVF, V4, V5 and V6    Clinical impression: abnormal EKG            Current Outpatient Medications   Medication Sig Dispense Refill    aspirin 81 MG EC tablet Take 1 tablet by mouth Every Night.      brimonidine (ALPHAGAN) 0.2 % ophthalmic solution Administer 1 drop to both eyes 2 (Two) Times a Day.      bumetanide (BUMEX) 1 MG tablet Take 1 tablet by mouth 2 (Two) Times a Day. 180 tablet 1    Cholecalciferol (Vitamin D-3) 125 MCG (5000 UT) tablet Take 1 tablet by mouth Daily.      DULoxetine (CYMBALTA) 30 MG capsule Take 1 capsule by mouth Every Night. (Patient taking differently: Take 1 capsule by mouth 2 (Two) Times a Day.)      Insulin Glargine, 2 Unit Dial, (TOUJEO) 300 UNIT/ML solution pen-injector injection Inject 10 Units under the skin into the appropriate area as directed Daily. (Patient taking differently: Inject 22 Units under the skin into the appropriate area as directed Daily.)      insulin lispro (humaLOG) 100 UNIT/ML injection Inject 0-14 Units under the skin into the appropriate area as directed 3 (Three) Times a Day Before Meals.  12    latanoprost (XALATAN) 0.005 % ophthalmic solution Administer 1 drop to both eyes every night at bedtime.      Magnesium 400 " MG capsule Take 400 mg by mouth As Needed.      multivitamin with minerals (MULTIVITAMIN ADULTS PO) Take 1 tablet by mouth Daily.      testosterone (ANDROGEL) 25 MG/2.5GM (1%) gel gel Place 25 mg on the skin as directed by provider 2 (Two) Times a Week.      traMADol (ULTRAM) 50 MG tablet Take 1 tablet by mouth At Night As Needed.       No current facility-administered medications for this visit.     Assessment:       Diagnosis Plan   1. PAF (paroxysmal atrial fibrillation)  ECG 12 Lead    Adult Transthoracic Echo Complete W/ Cont if Necessary Per Protocol      2. PAD (peripheral artery disease)        3. COCO (obstructive sleep apnea)        4. Chronic diastolic CHF (congestive heart failure)        5. Pulmonary hypertension        6. Chest pain, atypical  Stress Test With Myocardial Perfusion One Day    Adult Transthoracic Echo Complete W/ Cont if Necessary Per Protocol      7. Abnormal EKG  Stress Test With Myocardial Perfusion One Day           Orders Placed This Encounter   Procedures    Stress Test With Myocardial Perfusion One Day     Standing Status:   Future     Standing Expiration Date:   2/4/2026     Order Specific Question:   What stress agent will be used?     Answer:   Regadenoson (Lexiscan)     Order Specific Question:   Difficulty walking criteria?     Answer:   Poor Exercise Tolerance     Order Specific Question:   Reason for exam?     Answer:   Chest Pain     Order Specific Question:   Release to patient     Answer:   Routine Release [[id number]]    ECG 12 Lead     This order was created via procedure documentation     Order Specific Question:   Release to patient     Answer:   Routine Release [[id number]]    Adult Transthoracic Echo Complete W/ Cont if Necessary Per Protocol     Standing Status:   Future     Standing Expiration Date:   2/4/2026     Order Specific Question:   Reason for exam?     Answer:   Chest Pain     Order Specific Question:   Release to patient     Answer:   Routine Release  [[id number]]         Plan:         1.  74-year-old gentleman with coronary artery disease status post coronary artery bypass grafting times 3 July 2019 with LIMA to LAD, vein graft to obtuse marginal 1, vein graft to distal right coronary artery with normal left ventricular systolic function.  Then non-STEMI and  inferolateral wall ischemia on perfusion stress test.  Cardiac catheterization 11/2019 showed patent grafts normal left ventricular systolic function. Preserved LV function on echo June 2024.  -He he is having some atypical chest pain.  I will have him return for nonwalking protocol perfusion stress test  -Continue aspirin.  He is not on statin therapy due to preference  2.  Paroxysmal Z-ufi-zgkwcaxer diagnoses June 2024 in the setting of urosepsis.  Elevated CHADS2 Vascor of 6.  -We previously stopped low-dose metoprolol tartrate and stopped amiodarone due to fatigue.  -He prefers not to be on Eliquis since he appears to be maintaining normal sinus rhythm.  -This is considered complex management of a chronic medical condition.  3. Hypertension .  Blood pressure little elevated but usually lower so we will follow clinically at this time  4.  Hyperlipidemia-not on statin therapy and previously refused to add statin   5.  Diabetes mellitus-on insulin and now following with endocrinology.  Last hemoglobin A1c 8.4 in December.  6.  Obstructive sleep apnea-he is now followed by sleep medicine through [facility]  7.  Pulmonary hypertension RVSP  74 mmHg on echo June 2024.  Remains on routine oxygen  8.  Bilateral carotid artery stenosis mild on duplex July 2019  9.  Chronic diastolic congestive heart failure/heart failure preserved ejection fraction on last echo June 2024-He is now on bumetanide 1 mg twice daily.   10.  Mild digital ischemia bilateral on AMINTA May 2022.  He is following with vascular, Dr. Darci chaudhary  11. History of hypogonadism with prior testosterone replacement treatment.    12.Chronic kidney  disease follows with nephrology.  He had temporary dialysis.  He is now followed by Dr. Mayen  13. remote history of thalamic hemorrhage noted on MRI July 2019  14.  Spinal stenosis  15. urosepsis 05/2024 no recurrent UTI June 2024.    16.  Left upper extremity thrombophlebitis but no DVT June 2024    Will give further recommendation after his stress test and echocardiogram.          It has been a pleasure to participate in this patient's care.      Thank you,  LEIDY Mejía      **I used Dragon to dictate this note:**

## 2025-02-21 ENCOUNTER — TELEPHONE (OUTPATIENT)
Dept: CARDIOLOGY | Facility: CLINIC | Age: 75
End: 2025-02-21
Payer: MEDICARE

## 2025-02-24 ENCOUNTER — HOSPITAL ENCOUNTER (OUTPATIENT)
Dept: CARDIOLOGY | Facility: HOSPITAL | Age: 75
Discharge: HOME | End: 2025-02-24
Admitting: NURSE PRACTITIONER
Payer: MEDICARE

## 2025-02-24 ENCOUNTER — TELEPHONE (OUTPATIENT)
Dept: CARDIOLOGY | Facility: CLINIC | Age: 75
End: 2025-02-24
Payer: MEDICARE

## 2025-02-24 ENCOUNTER — HOSPITAL ENCOUNTER (OUTPATIENT)
Dept: CARDIOLOGY | Facility: HOSPITAL | Age: 75
Discharge: HOME | End: 2025-02-24
Payer: MEDICARE

## 2025-02-24 VITALS
HEIGHT: 68 IN | HEART RATE: 70 BPM | BODY MASS INDEX: 28.34 KG/M2 | WEIGHT: 187 LBS | SYSTOLIC BLOOD PRESSURE: 136 MMHG | DIASTOLIC BLOOD PRESSURE: 78 MMHG

## 2025-02-24 VITALS — HEIGHT: 68 IN | WEIGHT: 186.95 LBS | BODY MASS INDEX: 28.33 KG/M2

## 2025-02-24 DIAGNOSIS — I48.0 PAF (PAROXYSMAL ATRIAL FIBRILLATION): ICD-10-CM

## 2025-02-24 DIAGNOSIS — R07.89 CHEST PAIN, ATYPICAL: ICD-10-CM

## 2025-02-24 DIAGNOSIS — R94.31 ABNORMAL EKG: ICD-10-CM

## 2025-02-24 LAB
AORTIC DIMENSIONLESS INDEX: 0.63 (DI)
ASCENDING AORTA: 3.2 CM
AV MEAN PRESS GRAD SYS DOP V1V2: 4 MMHG
AV VMAX SYS DOP: 148 CM/SEC
BH CV ECHO LEFT VENTRICLE GLOBAL LONGITUDINAL STRAIN: -17.4 %
BH CV ECHO MEAS - ACS: 1.82 CM
BH CV ECHO MEAS - AO MAX PG: 8.8 MMHG
BH CV ECHO MEAS - AO ROOT AREA (BSA CORRECTED): 1.7 CM2
BH CV ECHO MEAS - AO ROOT DIAM: 3.3 CM
BH CV ECHO MEAS - AO V2 VTI: 26.7 CM
BH CV ECHO MEAS - AVA(I,D): 1.87 CM2
BH CV ECHO MEAS - EDV(CUBED): 65.5 ML
BH CV ECHO MEAS - EDV(MOD-SP2): 74 ML
BH CV ECHO MEAS - EDV(MOD-SP4): 80 ML
BH CV ECHO MEAS - EF(MOD-SP2): 73 %
BH CV ECHO MEAS - EF(MOD-SP4): 75 %
BH CV ECHO MEAS - ESV(MOD-SP2): 20 ML
BH CV ECHO MEAS - ESV(MOD-SP4): 20 ML
BH CV ECHO MEAS - FS: 18.7 %
BH CV ECHO MEAS - IVS/LVPW: 1.11 CM
BH CV ECHO MEAS - IVSD: 1.35 CM
BH CV ECHO MEAS - LAT PEAK E' VEL: 5.1 CM/SEC
BH CV ECHO MEAS - LV DIASTOLIC VOL/BSA (35-75): 39.9 CM2
BH CV ECHO MEAS - LV MASS(C)D: 185.8 GRAMS
BH CV ECHO MEAS - LV MAX PG: 3.3 MMHG
BH CV ECHO MEAS - LV MEAN PG: 2 MMHG
BH CV ECHO MEAS - LV SYSTOLIC VOL/BSA (12-30): 10 CM2
BH CV ECHO MEAS - LV V1 MAX: 90.8 CM/SEC
BH CV ECHO MEAS - LV V1 VTI: 16.7 CM
BH CV ECHO MEAS - LVIDD: 4 CM
BH CV ECHO MEAS - LVIDS: 3.3 CM
BH CV ECHO MEAS - LVOT AREA: 3 CM2
BH CV ECHO MEAS - LVOT DIAM: 1.95 CM
BH CV ECHO MEAS - LVPWD: 1.22 CM
BH CV ECHO MEAS - MED PEAK E' VEL: 3.4 CM/SEC
BH CV ECHO MEAS - MR MAX PG: 19.7 MMHG
BH CV ECHO MEAS - MR MAX VEL: 222.2 CM/SEC
BH CV ECHO MEAS - MV A DUR: 0.13 SEC
BH CV ECHO MEAS - MV A MAX VEL: 86.1 CM/SEC
BH CV ECHO MEAS - MV DEC SLOPE: 447.5 CM/SEC2
BH CV ECHO MEAS - MV DEC TIME: 0.18 SEC
BH CV ECHO MEAS - MV E MAX VEL: 97.3 CM/SEC
BH CV ECHO MEAS - MV E/A: 1.13
BH CV ECHO MEAS - MV MAX PG: 3.3 MMHG
BH CV ECHO MEAS - MV MEAN PG: 1.58 MMHG
BH CV ECHO MEAS - MV P1/2T: 58.6 MSEC
BH CV ECHO MEAS - MV V2 VTI: 26.5 CM
BH CV ECHO MEAS - MVA(P1/2T): 3.8 CM2
BH CV ECHO MEAS - MVA(VTI): 1.88 CM2
BH CV ECHO MEAS - PA V2 MAX: 69.2 CM/SEC
BH CV ECHO MEAS - PULM A REVS DUR: 0.12 SEC
BH CV ECHO MEAS - PULM A REVS VEL: 15 CM/SEC
BH CV ECHO MEAS - PULM DIAS VEL: 49.3 CM/SEC
BH CV ECHO MEAS - PULM S/D: 1
BH CV ECHO MEAS - PULM SYS VEL: 49.3 CM/SEC
BH CV ECHO MEAS - QP/QS: 0.8
BH CV ECHO MEAS - RAP SYSTOLE: 3 MMHG
BH CV ECHO MEAS - RV MAX PG: 1.04 MMHG
BH CV ECHO MEAS - RV V1 MAX: 51 CM/SEC
BH CV ECHO MEAS - RV V1 VTI: 10.8 CM
BH CV ECHO MEAS - RVOT DIAM: 2.16 CM
BH CV ECHO MEAS - RVSP: 22 MMHG
BH CV ECHO MEAS - SV(LVOT): 49.9 ML
BH CV ECHO MEAS - SV(MOD-SP2): 54 ML
BH CV ECHO MEAS - SV(MOD-SP4): 60 ML
BH CV ECHO MEAS - SV(RVOT): 39.8 ML
BH CV ECHO MEAS - SVI(LVOT): 24.9 ML/M2
BH CV ECHO MEAS - SVI(MOD-SP2): 26.9 ML/M2
BH CV ECHO MEAS - SVI(MOD-SP4): 29.9 ML/M2
BH CV ECHO MEAS - TAPSE (>1.6): 1.23 CM
BH CV ECHO MEAS - TR MAX PG: 18.6 MMHG
BH CV ECHO MEAS - TR MAX VEL: 215.5 CM/SEC
BH CV ECHO MEASUREMENTS AVERAGE E/E' RATIO: 22.89
BH CV NUCLEAR PRIOR STUDY: 1
BH CV REST NUCLEAR ISOTOPE DOSE: 11.4 MCI
BH CV STRESS BP STAGE 1: NORMAL
BH CV STRESS COMMENTS STAGE 1: NORMAL
BH CV STRESS DOSE REGADENOSON STAGE 1: 0.4
BH CV STRESS DURATION MIN STAGE 1: 0
BH CV STRESS DURATION SEC STAGE 1: 10
BH CV STRESS HR STAGE 1: 87
BH CV STRESS NUCLEAR ISOTOPE DOSE: 35.9 MCI
BH CV STRESS PROTOCOL 1: NORMAL
BH CV STRESS RECOVERY BP: NORMAL MMHG
BH CV STRESS RECOVERY HR: 88 BPM
BH CV STRESS STAGE 1: 1
BH CV XLRA - RV BASE: 3.8 CM
BH CV XLRA - RV LENGTH: 7.4 CM
BH CV XLRA - RV MID: 2.6 CM
BH CV XLRA - TDI S': 6.7 CM/SEC
LEFT ATRIUM VOLUME INDEX: 30.1 ML/M2
LV EF BIPLANE MOD: 73.7 %
MAXIMAL PREDICTED HEART RATE: 145 BPM
PERCENT MAX PREDICTED HR: 60 %
SINUS: 2.9 CM
SPECT HRT GATED+EF W RNC IV: 51 %
STJ: 2.9 CM
STRESS BASELINE BP: NORMAL MMHG
STRESS BASELINE HR: 78 BPM
STRESS PERCENT HR: 71 %
STRESS POST EXERCISE DUR SEC: 10 SEC
STRESS POST PEAK BP: NORMAL MMHG
STRESS POST PEAK HR: 87 BPM
STRESS TARGET HR: 123 BPM

## 2025-02-24 PROCEDURE — A9502 TC99M TETROFOSMIN: HCPCS | Performed by: NURSE PRACTITIONER

## 2025-02-24 PROCEDURE — 93016 CV STRESS TEST SUPVJ ONLY: CPT | Performed by: STUDENT IN AN ORGANIZED HEALTH CARE EDUCATION/TRAINING PROGRAM

## 2025-02-24 PROCEDURE — 93306 TTE W/DOPPLER COMPLETE: CPT | Performed by: INTERNAL MEDICINE

## 2025-02-24 PROCEDURE — 25510000001 PERFLUTREN 6.52 MG/ML SUSPENSION 2 ML VIAL: Performed by: NURSE PRACTITIONER

## 2025-02-24 PROCEDURE — 93017 CV STRESS TEST TRACING ONLY: CPT

## 2025-02-24 PROCEDURE — 93356 MYOCRD STRAIN IMG SPCKL TRCK: CPT | Performed by: INTERNAL MEDICINE

## 2025-02-24 PROCEDURE — 34310000005 TECHNETIUM TETROFOSMIN KIT: Performed by: NURSE PRACTITIONER

## 2025-02-24 PROCEDURE — 93356 MYOCRD STRAIN IMG SPCKL TRCK: CPT

## 2025-02-24 PROCEDURE — 78452 HT MUSCLE IMAGE SPECT MULT: CPT | Performed by: STUDENT IN AN ORGANIZED HEALTH CARE EDUCATION/TRAINING PROGRAM

## 2025-02-24 PROCEDURE — 93306 TTE W/DOPPLER COMPLETE: CPT

## 2025-02-24 PROCEDURE — 78452 HT MUSCLE IMAGE SPECT MULT: CPT

## 2025-02-24 PROCEDURE — 25010000002 REGADENOSON 0.4 MG/5ML SOLUTION: Performed by: NURSE PRACTITIONER

## 2025-02-24 PROCEDURE — 93018 CV STRESS TEST I&R ONLY: CPT | Performed by: STUDENT IN AN ORGANIZED HEALTH CARE EDUCATION/TRAINING PROGRAM

## 2025-02-24 RX ORDER — REGADENOSON 0.08 MG/ML
0.4 INJECTION, SOLUTION INTRAVENOUS
Status: COMPLETED | OUTPATIENT
Start: 2025-02-24 | End: 2025-02-24

## 2025-02-24 RX ADMIN — TETROFOSMIN 1 DOSE: 1.38 INJECTION, POWDER, LYOPHILIZED, FOR SOLUTION INTRAVENOUS at 11:55

## 2025-02-24 RX ADMIN — PERFLUTREN 1 ML: 6.52 INJECTION, SUSPENSION INTRAVENOUS at 10:35

## 2025-02-24 RX ADMIN — REGADENOSON 0.4 MG: 0.08 INJECTION, SOLUTION INTRAVENOUS at 11:55

## 2025-02-24 RX ADMIN — TETROFOSMIN 1 DOSE: 1.38 INJECTION, POWDER, LYOPHILIZED, FOR SOLUTION INTRAVENOUS at 10:44

## 2025-02-24 NOTE — TELEPHONE ENCOUNTER
Please inform patient stress that shows no evidence of tightly blocked coronary artery and no significant change from last stress test in 2022  Echocardiogram was reviewed and overall remained stable with much improved RVSP ( lung pressure) from last echo.  Okay to schedule 6-month follow-up

## 2025-02-25 NOTE — TELEPHONE ENCOUNTER
Notified patient of results/recommendations. Patient verbalized understanding. FU scheduled.     Ariana Ordaz RN  Triage [facility]

## 2025-04-03 ENCOUNTER — HOSPITAL ENCOUNTER (OUTPATIENT)
Facility: HOSPITAL | Age: 75
Discharge: HOME | End: 2025-04-03
Payer: MEDICARE

## 2025-04-16 ENCOUNTER — TELEPHONE (OUTPATIENT)
Dept: CARDIOLOGY | Age: 75
End: 2025-04-16
Payer: MEDICARE

## 2025-04-16 NOTE — TELEPHONE ENCOUNTER
Pt's wife called to see if Mr. Verma will need an antibiotic prior to a dental procedure or cleanings.  Please advise, thanks/[facility]    # [phone]

## 2025-04-16 NOTE — TELEPHONE ENCOUNTER
I called and relayed to patient's wife.  Patient does not need an antibiotic prior to dental procedure per Danii Dennison, APRN.  Patient's wife verbalized understanding of this./ JAY

## 2025-08-18 ENCOUNTER — HOSPITAL ENCOUNTER (OUTPATIENT)
Dept: GENERAL RADIOLOGY | Facility: HOSPITAL | Age: 75
Discharge: HOME | End: 2025-08-18
Admitting: FAMILY MEDICINE
Payer: MEDICARE

## 2025-08-18 DIAGNOSIS — R22.31 MASS OF HAND, RIGHT: ICD-10-CM

## 2025-08-18 PROCEDURE — 73120 X-RAY EXAM OF HAND: CPT

## 2025-08-26 ENCOUNTER — OFFICE VISIT (OUTPATIENT)
Dept: CARDIOLOGY | Age: 75
End: 2025-08-26
Payer: MEDICARE

## 2025-08-26 VITALS
HEIGHT: 69 IN | SYSTOLIC BLOOD PRESSURE: 130 MMHG | BODY MASS INDEX: 28.53 KG/M2 | DIASTOLIC BLOOD PRESSURE: 80 MMHG | HEART RATE: 76 BPM | WEIGHT: 192.6 LBS | OXYGEN SATURATION: 90 %

## 2025-08-26 DIAGNOSIS — I50.32 CHRONIC DIASTOLIC CHF (CONGESTIVE HEART FAILURE): ICD-10-CM

## 2025-08-26 DIAGNOSIS — I27.20 PULMONARY HYPERTENSION: ICD-10-CM

## 2025-08-26 DIAGNOSIS — I73.9 PAD (PERIPHERAL ARTERY DISEASE): ICD-10-CM

## 2025-08-26 DIAGNOSIS — I25.10 CORONARY ARTERY DISEASE INVOLVING NATIVE CORONARY ARTERY OF NATIVE HEART WITHOUT ANGINA PECTORIS: Primary | ICD-10-CM

## 2025-08-26 DIAGNOSIS — I48.0 PAF (PAROXYSMAL ATRIAL FIBRILLATION): ICD-10-CM

## 2025-08-26 RX ORDER — MECOBALAMIN 5000 MCG
LOZENGE ORAL
COMMUNITY

## (undated) DEVICE — CANN AORT ROOT DLP VNT 14G 7F

## (undated) DEVICE — APPL CHLORAPREP HI/LITE 26ML ORNG

## (undated) DEVICE — ANTIBACTERIAL UNDYED BRAIDED (POLYGLACTIN 910), SYNTHETIC ABSORBABLE SUTURE: Brand: COATED VICRYL

## (undated) DEVICE — TRY SKINPREP DRYPREP

## (undated) DEVICE — INTENDED FOR TISSUE SEPARATION, AND OTHER PROCEDURES THAT REQUIRE A SHARP SURGICAL BLADE TO PUNCTURE OR CUT.: Brand: BARD-PARKER ® CARBON RIB-BACK BLADES

## (undated) DEVICE — ST. SORBAVIEW ULTIMATE IJ SYSTEM A,C: Brand: CENTURION

## (undated) DEVICE — DRSNG SLVR/ANTIBAC PRIMASEAL POST/OP ADHS 3.5X12IN

## (undated) DEVICE — SUT ETHIB 2 CV V37 MS/4 30IN MX69G

## (undated) DEVICE — SPNG GZ WOVN 4X4IN 12PLY 10/BX STRL

## (undated) DEVICE — OPTIFOAM GENTLE SA, POSTOP, 4X12: Brand: MEDLINE

## (undated) DEVICE — ST TOURNI COMPL A/ 7IN

## (undated) DEVICE — CATH DIAG IMPULSE MPA2 5F 125CM

## (undated) DEVICE — NDL HYPO PRECISIONGLIDE REG 25G 1 1/2

## (undated) DEVICE — PK CATH CARD 40

## (undated) DEVICE — SOL ISO/ALC RUB 70PCT 4OZ

## (undated) DEVICE — ADHS SKIN DERMABOND TOP ADVANCED

## (undated) DEVICE — CATH DIAG IMPULSE RCB 5F 100CM

## (undated) DEVICE — SENSR CERBRL O2 PK/2

## (undated) DEVICE — DEV INDEFLATOR

## (undated) DEVICE — DRSNG GZ PETROLTM XEROFORM CURAD 1X8IN STRL

## (undated) DEVICE — SUT VIC 1 CT1 36IN J947H

## (undated) DEVICE — PDS II VLT 0 107CM AG ST3: Brand: ENDOLOOP

## (undated) DEVICE — GLIDESHEATH SLENDER STAINLESS STEEL KIT: Brand: GLIDESHEATH SLENDER

## (undated) DEVICE — CATH DIAG IMPULSE IMT 5F 100CM

## (undated) DEVICE — 3M™ IOBAN™ 2 ANTIMICROBIAL INCISE DRAPE 6650EZ: Brand: IOBAN™ 2

## (undated) DEVICE — GW EMR FIX EXCHG J STD .035 3MM 260CM

## (undated) DEVICE — DRP C/ARM 41X74IN

## (undated) DEVICE — PK ATS CUST W CARDIOTOMY RESEVOIR

## (undated) DEVICE — PROXIMATE RH ROTATING HEAD SKIN STAPLERS (35 WIDE) CONTAINS 35 STAINLESS STEEL STAPLES: Brand: PROXIMATE

## (undated) DEVICE — THE STERILE LIGHT HANDLE COVER IS USED WITH STERIS SURGICAL LIGHTING AND VISUALIZATION SYSTEMS.

## (undated) DEVICE — PAD,ABDOMINAL,8"X10",ST,LF: Brand: MEDLINE

## (undated) DEVICE — DRSNG WND SIL OPTIFOAM GENTLE BRDR ADHS W/SA 4X4IN

## (undated) DEVICE — SEALANT HEMO SURG COSEAL PREMIX 8ML

## (undated) DEVICE — DRSNG WND GZ CURAD OIL EMULSION 3X8IN LF STRL 1PK

## (undated) DEVICE — SCANLAN® VASCU-STATT® II SINGLE-USE BULLDOG CLAMP W/FIRMER CLAMPING PRESS - MIDI ANGLED 45° (YELLOW), CLAMPING PRESSURE 75-80 G (2/STERILE PKG): Brand: SCANLAN® VASCU-STATT® II SINGLE-USE BULLDOG CLAMP W/FIRMER CLAMPING PRESS

## (undated) DEVICE — JELLY,LUBE,STERILE,FLIP TOP,TUBE,4-OZ: Brand: MEDLINE

## (undated) DEVICE — LN SMPL CO2 SHTRM SD STREAM W/M LUER

## (undated) DEVICE — CONN STR 1/2INX3/8IN

## (undated) DEVICE — SENSR O2 OXIMAX FNGR A/ 18IN NONSTR

## (undated) DEVICE — CATH VENT MIV RADL PIG ST TIP 5F 110CM

## (undated) DEVICE — BANDAGE,GAUZE,BULKEE II,4.5"X4.1YD,STRL: Brand: MEDLINE

## (undated) DEVICE — LOU MINOR PROCEDURE: Brand: MEDLINE INDUSTRIES, INC.

## (undated) DEVICE — CATH DIAG IMPULSE FL3.5 5F 100CM

## (undated) DEVICE — 28 FR STRAIGHT – SOFT PVC CATHETER: Brand: PVC THORACIC CATHETERS

## (undated) DEVICE — GLV SURG BIOGEL LTX PF 8

## (undated) DEVICE — DRP SLUSH MACH FOR STND ALONE OM-ORS-321

## (undated) DEVICE — VASOVIEW HEMOPRO: Brand: VASOVIEW HEMOPRO

## (undated) DEVICE — CLAMP INSERT: Brand: STEALTH® CLAMP INSERT

## (undated) DEVICE — 450 ML BOTTLE OF 0.05% CHLORHEXIDINE GLUCONATE IN 99.95% STERILE WATER FOR IRRIGATION, USP AND APPLICATOR.: Brand: IRRISEPT ANTIMICROBIAL WOUND LAVAGE

## (undated) DEVICE — PENCL E/S ULTRAVAC TELESCP NOSE HOLSTR 10FT

## (undated) DEVICE — CORONARY ARTERY BYPASS GRAFT MARKERS, STAINLESS STEEL, DISTAL, WITHOUT HOLDER: Brand: ANASTOMARK CORONARY ARTERY BYPASS GRAFT MARKERS, STAINLESS STEEL, DISTAL

## (undated) DEVICE — Device

## (undated) DEVICE — SYS PERFUS SEP PLATLT W TIPS CUST

## (undated) DEVICE — CANN O2 ETCO2 FITS ALL CONN CO2 SMPL A/ 7IN DISP LF

## (undated) DEVICE — DRSNG WND BORDR/ADHS NONADHR/GZ LF 4X10IN STRL

## (undated) DEVICE — NDL PERC 1PRT THNWALL W/BASEPLT 18G 7CM

## (undated) DEVICE — SPNG LAP 18X18IN LF STRL PK/5

## (undated) DEVICE — UNDERCAST PADDING: Brand: DEROYAL

## (undated) DEVICE — DRAPE,REIN 53X77,STERILE: Brand: MEDLINE

## (undated) DEVICE — DRSNG GZ CURAD XEROFORM NONADHR OVERWRAP 5X9IN

## (undated) DEVICE — 6F .070 XB 3 100CM: Brand: VISTA BRITE TIP

## (undated) DEVICE — BNDG ELAS ELITE V/CLOSE 3IN 5YD LF STRL

## (undated) DEVICE — LEGGINGS, PAIR, CLEAR, STERILE: Brand: MEDLINE

## (undated) DEVICE — BNDG ELAS ELITE V/CLOSE 6IN 5YD LF STRL

## (undated) DEVICE — SPNG DISECTOR KTNER XRAY COTN 1/4X9/16IN PK/5

## (undated) DEVICE — STPLR SKIN VISISTAT WD 35CT

## (undated) DEVICE — CVR PROB 96IN LF STRL

## (undated) DEVICE — GLIDESHEATH BASIC HYDROPHILIC COATED INTRODUCER SHEATH: Brand: GLIDESHEATH

## (undated) DEVICE — ADAPT CLN BIOGUARD AIR/H2O DISP

## (undated) DEVICE — Device: Brand: MEDEX

## (undated) DEVICE — TRAP FLD MINIVAC MEGADYNE 100ML

## (undated) DEVICE — KT MANIFLD CARDIAC

## (undated) DEVICE — 3M™ MEDIPORE™ H SOFT CLOTH SURGICAL TAPE 2862, 2 INCH X 10 YARD (5CM X 9,1M), 12 ROLLS/CASE: Brand: 3M™ MEDIPORE™

## (undated) DEVICE — PREP SOL POVIDONE/IODINE BT 4OZ

## (undated) DEVICE — 8 FOOT DISPOSABLE EXTENSION CABLE WITH SAFE CONNECT / ALLIGATOR CLIP

## (undated) DEVICE — GW TEAR DROP NAT 3X100CM

## (undated) DEVICE — PANTY KNIT MATERN L/XL

## (undated) DEVICE — INSUFFLATION TUBING,LAPAROSCOPIC: Brand: DEROYAL

## (undated) DEVICE — CANN IRR VEN BVL TP

## (undated) DEVICE — PK HEART OPN 40

## (undated) DEVICE — GOWN ,SIRUS,NONREINFORCED 3XL: Brand: MEDLINE

## (undated) DEVICE — PK HIP TOTL 40

## (undated) DEVICE — CATH DIAG IMPULSE FR4 5F 100CM

## (undated) DEVICE — GLV SURG BIOGEL M LTX PF 7

## (undated) DEVICE — DRAPE,HIP,W/POUCHES,STERILE: Brand: MEDLINE

## (undated) DEVICE — GLV SURG BIOGEL LTX PF 8 1/2

## (undated) DEVICE — GLV SURG SENSICARE W/ALOE PF LF 8 STRL

## (undated) DEVICE — SUT VIC 0 CT1 36IN J946H

## (undated) DEVICE — GUIDEPIN TEMP THRD 3.2X508MM DISP

## (undated) DEVICE — CANN ART DLP 1PC EDPA A/ 20F

## (undated) DEVICE — BIOPATCH™ ANTIMICROBIAL DRESSING WITH CHLORHEXIDINE GLUCONATE IS A HYDROPHILLIC POLYURETHANE ABSORPTIVE FOAM WITH CHLORHEXIDINE GLUCONATE (CHG) WHICH INHIBITS BACTERIAL GROWTH UNDER THE DRESSING. THE DRESSING IS INTENDED TO BE USED TO ABSORB EXUDATE, COVER A WOUND CAUSED BY VASCULAR AND NONVASCULAR PERCUTANEOUS MEDICAL DEVICES DURING SURGERY, AS WELL AS REDUCE LOCAL INFECTION AND COLONIZATION OF MICROORGANISMS.: Brand: BIOPATCH

## (undated) DEVICE — KT ORCA ORCAPOD DISP STRL

## (undated) DEVICE — ROTATING SURGICAL PUNCHES, 1 PER POUCH: Brand: A&E MEDICAL / ROTATING SURGICAL PUNCHES

## (undated) DEVICE — GW PRESSUREWIRE AERIS W/ AGILE TP 175CM

## (undated) DEVICE — BALN PRESS WEDGE 5F 110CM

## (undated) DEVICE — TUBING, SUCTION, 1/4" X 10', STRAIGHT: Brand: MEDLINE

## (undated) DEVICE — PATIENT RETURN ELECTRODE, SINGLE-USE, CONTACT QUALITY MONITORING, ADULT, WITH 9FT CORD, FOR PATIENTS WEIGING OVER 33LBS. (15KG): Brand: MEGADYNE

## (undated) DEVICE — TOWEL,OR,DSP,ST,NATURAL,DLX,4/PK,20PK/CS: Brand: MEDLINE

## (undated) DEVICE — CATH IV INSYTE AUTOGARD SHLD 20G 1.88IN

## (undated) DEVICE — DRSNG BURN ACTICOAT FLEX 7 1X24IN

## (undated) DEVICE — ZIP 24 SURGICAL SKIN CLOSURE DEVICE, PSA: Brand: ZIP 24 SURGICAL SKIN CLOSURE DEVICE

## (undated) DEVICE — GLV SURG SENSICARE PI MIC PF SZ7 LF STRL

## (undated) DEVICE — PK PERFUS CUST W/CARDIOPLEGIA

## (undated) DEVICE — OASIS DRAIN, SINGLE, INLINE & ATS COMPATIBLE: Brand: OASIS

## (undated) DEVICE — GAUZE,SPONGE,FLUFF,6"X6.75",STRL,10/TRAY: Brand: MEDLINE

## (undated) DEVICE — ACCESSRAIL PLATFORM (STANDARD BLADE): Brand: ACCESSRAIL PLATFORM (STANDARD BLADE)

## (undated) DEVICE — GLV SURG BIOGEL LTX PF 7

## (undated) DEVICE — ST PERFUS M/

## (undated) DEVICE — VESSEL LOOPS X-RAY DETECTABLE: Brand: DEROYAL

## (undated) DEVICE — MARKR SKIN W/RULR AND LBL

## (undated) DEVICE — CANN VESL FREE FLO 2MM

## (undated) DEVICE — BIT DRL FREE/HND 4.3MM

## (undated) DEVICE — DRSNG SURESITE WNDW 2.38X2.75

## (undated) DEVICE — MAT FLR ABSORBENT LG 4FT 10 2.5FT

## (undated) DEVICE — BNDG ELAS ELITE V/CLOSE 4IN 5YD LF STRL

## (undated) DEVICE — BLOWER/MISTER AXIOUS OPCAB W/TBG

## (undated) DEVICE — SUT GUT CHRM 3/0 SH 27IN G122H

## (undated) DEVICE — PICO 7 10CM X 30CM: Brand: PICO™ 7

## (undated) DEVICE — PK HIP PINNING 40

## (undated) DEVICE — BNDG ELAS CO-FLEX SLF ADHR 6IN 5YD LF STRL

## (undated) DEVICE — GLV SURG SENSICARE PI PF LF 7 GRN STRL

## (undated) DEVICE — TB SXN DLP RIGD MACROSUCKER 6F 3IN

## (undated) DEVICE — SYR LUERLOK 20CC BX/50

## (undated) DEVICE — HEMOCONCENTRATOR PERFUS LPS06

## (undated) DEVICE — GLV SURG SIGNATURE ESSENTIAL PF LTX SZ8.5

## (undated) DEVICE — DRP C/ARMOR

## (undated) DEVICE — TEMP PACING WIRE: Brand: MYO/WIRE

## (undated) DEVICE — REFLECTION FLEXIBLE DRILL 25MM: Brand: REFLECTION

## (undated) DEVICE — GLV SURG PREMIERPRO ORTHO LTX PF SZ8.5 BRN